# Patient Record
Sex: FEMALE | Race: WHITE | NOT HISPANIC OR LATINO | Employment: OTHER | URBAN - METROPOLITAN AREA
[De-identification: names, ages, dates, MRNs, and addresses within clinical notes are randomized per-mention and may not be internally consistent; named-entity substitution may affect disease eponyms.]

---

## 2017-01-06 ENCOUNTER — RADIANT APPOINTMENT (OUTPATIENT)
Dept: GENERAL RADIOLOGY | Facility: CLINIC | Age: 82
End: 2017-01-06
Attending: FAMILY MEDICINE
Payer: COMMERCIAL

## 2017-01-06 ENCOUNTER — OFFICE VISIT (OUTPATIENT)
Dept: FAMILY MEDICINE | Facility: CLINIC | Age: 82
End: 2017-01-06
Payer: COMMERCIAL

## 2017-01-06 VITALS
HEIGHT: 66 IN | HEART RATE: 64 BPM | WEIGHT: 171 LBS | TEMPERATURE: 97.3 F | OXYGEN SATURATION: 98 % | SYSTOLIC BLOOD PRESSURE: 136 MMHG | DIASTOLIC BLOOD PRESSURE: 69 MMHG | BODY MASS INDEX: 27.48 KG/M2
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
## 2017-01-06 DIAGNOSIS — M79.642 PAIN OF LEFT HAND: ICD-10-CM

## 2017-01-06 DIAGNOSIS — M79.642 PAIN OF LEFT HAND: Primary | ICD-10-CM

## 2017-01-06 PROCEDURE — 99213 OFFICE O/P EST LOW 20 MIN: CPT | Performed by: FAMILY MEDICINE

## 2017-01-06 PROCEDURE — 73090 X-RAY EXAM OF FOREARM: CPT | Mod: LT

## 2017-01-06 PROCEDURE — 73130 X-RAY EXAM OF HAND: CPT | Mod: LT

## 2017-01-06 ASSESSMENT — PAIN SCALES - GENERAL: PAINLEVEL: WORST PAIN (10)

## 2017-01-06 NOTE — MR AVS SNAPSHOT
After Visit Summary   1/6/2017    Tessa Mansfield    MRN: [id number]           Patient Information     Date Of Birth          1935        Visit Information        Provider Department      1/6/2017 1:20 PM Engelmann, Lauren Anneliese, MD [facility]        Today's Diagnoses     Pain of left hand    -  1     Fall from standing, initial encounter           Care Instructions    You can use ice to the area for 15-20 mins at a time to help with and swelling.   Use Tylenol as needed for pain.   If you are getting worse, please come back.         Follow-ups after your visit        Your next 10 appointments already scheduled     Mar 21, 2017  1:00 PM   (Arrive by 12:45 PM)   Pacemaker Check with  Cv Device 1   [facility] ([facility])    [address] [place] [address] [id number] [phone]            Nov 08, 2017 10:00 AM   Lab with  LAB   [facility] ([facility])    [address] [place] [address] [id number] [phone]            Nov 08, 2017 11:10 AM   (Arrive by 10:40 AM)   Return Visit with Marzena Martin MD   [facility]ph[facility] ([facility])    [address] [place] [id number] [phone]              Who to contact     If you have questions or need follow up information about today's clinic visit or your schedule please contact [facility] directly at [phone].  Normal or non-critical lab and imaging results will be communicated to you by MyChart, letter or phone within 4 business days after the clinic has received the results. If you do not hear from us within 7 days, please contact the clinic through MyChart or phone. If you have a critical or abnormal lab result, we will notify you by phone as soon as possible.  Submit refill requests through [employer] or call your pharmacy and they will  "forward the refill request to us. Please allow 3 business days for your refill to be completed.          Additional Information About Your Visit        [employer] Direct Information     [email or web address] lets you send messages to your doctor, view your test results, renew your prescriptions, schedule appointments and more. To sign up, go to [email or web address] . Click on \"Log in\" on the left side of the screen, which will take you to the Welcome page. Then click on \"Sign up Now\" on the right side of the page.     You will be asked to enter the access code listed below, as well as some personal information. Please follow the directions to create your username and password.     Your access code is: [email or web address]  Expires: 2017  2:24 PM     Your access code will  in 90 days. If you need help or a new code, please call your [facility] clinic or [phone].        Care EveryWhere ID     This is your Care EveryWhere ID. This could be used by other organizations to access your [facility] medical records  [id number]        Your Vitals Were     Pulse Temperature Height BMI (Body Mass Index) Pulse Oximetry       64 97.3  F (36.3  C) (Oral) 5' 6\" (1.676 m) 27.61 kg/m2 98%        Blood Pressure from Last 3 Encounters:   17 136/69   16 159/76   10/27/16 135/71    Weight from Last 3 Encounters:   17 171 lb (77.565 kg)   16 169 lb (76.658 kg)   10/27/16 173 lb 11.2 oz (78.79 kg)                 Today's Medication Changes          These changes are accurate as of: 17  2:46 PM.  If you have any questions, ask your nurse or doctor.               Start taking these medicines.        Dose/Directions    order for DME   Used for:  Pain of left hand, Fall from standing, initial encounter   Started by:  Engelmann, Lauren Anneliese, MD        Equipment being ordered: Left hand and wrist brace   Quantity:  1 each   Refills:  0            Where to get your medicines      Some of these will need a paper prescription and " others can be bought over the counter.  Ask your nurse if you have questions.     Bring a paper prescription for each of these medications    - order for DME             Primary Care Provider Office Phone # Fax #    Pedro Gomez [phone]       [facility] [address] [place] [phone]        Thank you!     Thank you for choosing [facility]  for your care. Our goal is always to provide you with excellent care. Hearing back from our patients is one way we can continue to improve our services. Please take a few minutes to complete the written survey that you may receive in the mail after your visit with us. Thank you!             Your Updated Medication List - Protect others around you: Learn how to safely use, store and throw away your medicines at www.disposemymeds.org.          This list is accurate as of: 1/6/17  2:46 PM.  Always use your most recent med list.                   Brand Name Dispense Instructions for use    allopurinol 100 MG tablet    ZYLOPRIM    30 tablet    Take 1 tablet (100 mg) by mouth daily       amitriptyline 10 MG tablet    ELAVIL    60 tablet    Take one po qhs for a week then increase to 2 tabs po qhs       aspirin 81 MG tablet      Take 1 tablet by mouth daily.       cephalexin 250 MG capsule    KEFLEX    40 capsule    Take 1 capsule (250 mg) by mouth 4 times daily       clopidogrel 75 MG tablet    PLAVIX    90 tablet    Take 1 tablet (75 mg) by mouth daily       cyanocobalamin 1000 MCG tablet    vitamin  B-12     Take 1 tablet by mouth daily. As directed       doxycycline 100 MG capsule    VIBRAMYCIN    14 capsule    Take 1 capsule (100 mg) by mouth 2 times daily       * furosemide 20 MG tablet    LASIX    180 tablet    Take 20 mg by mouth 2 times daily       * furosemide 20 MG tablet    LASIX    90 tablet    Take 2 tablets (40 mg) by mouth daily       gabapentin 100 MG capsule    NEURONTIN    90 capsule    Take 1  capsule (100 mg) by mouth 3 times daily       hydrALAZINE 100 MG Tabs tablet    APRESOLINE    270 tablet    Take 1 tablet (100 mg) by mouth 3 times daily       isosorbide mononitrate 20 MG tablet    ISMO/MONOKET    540 tablet    Take 2 tablets (40 mg) by mouth 3 times daily Please see Dr. Mir for further refills.       itraconazole 100 MG capsule    SPORANOX    180 capsule    Take 2 capsules (200 mg) by mouth daily       * methylPREDNISolone 4 MG tablet    MEDROL DOSEPAK    21 tablet    Follow package instructions       * methylPREDNISolone 4 MG tablet    MEDROL DOSEPAK    21 tablet    Follow package instructions       metoprolol 100 MG tablet    LOPRESSOR    180 tablet    TAKE ONE TABLET BY MOUTH TWICE DAILY       multivitamin  with lutein Caps per capsule      Take 1 capsule by mouth daily       nitroglycerin 0.4 MG sublingual tablet    NITROSTAT    25 tablet    Place 1 tablet (0.4 mg) under the tongue See Admin Instructions If no relief after 3rd dose, call 911.       omeprazole 40 MG capsule    priLOSEC    90 capsule    TAKE ONE CAPSULE BY MOUTH ONCE DAILY       order for DME     1 each    Equipment being ordered: Left hand and wrist brace       * potassium chloride 10 MEQ tablet    K-TAB,KLOR-CON    180 tablet    Take 10 mEq by mouth 2 times daily       * potassium chloride 10 MEQ tablet    K-TAB,KLOR-CON    180 tablet    TAKE ONE TABLET BY MOUTH TWICE DAILY       pravastatin 80 MG tablet    PRAVACHOL    90 tablet    TAKE ONE TABLET BY MOUTH ONCE DAILY       timolol 0.5 % ophthalmic solution    TIMOPTIC         * Notice:  This list has 6 medication(s) that are the same as other medications prescribed for you. Read the directions carefully, and ask your doctor or other care provider to review them with you.

## 2017-01-06 NOTE — PROGRESS NOTES
Quick Note:    Reviewed in clinic with patient. Arthritic changes noted.     Lauren Engelmann, MD  ______

## 2017-01-06 NOTE — NURSING NOTE
"Chief Complaint   Patient presents with     Musculoskeletal Problem       Initial /69 mmHg  Pulse 64  Temp(Src) 97.3  F (36.3  C) (Oral)  Ht 5' 6\" (1.676 m)  Wt 171 lb (77.565 kg)  BMI 27.61 kg/m2  SpO2 98% Estimated body mass index is 27.61 kg/(m^2) as calculated from the following:    Height as of this encounter: 5' 6\" (1.676 m).    Weight as of this encounter: 171 lb (77.565 kg).  BP completed using cuff size: wrist cuff    Dayanara Webber MA      "

## 2017-01-06 NOTE — PROGRESS NOTES
SUBJECTIVE:                                                    Tessa Mansfield is a 81 year old female who presents to clinic today for the following health issues:    Joint Pain     Onset: 12/29/16    Description:   Location: left hand   Character: Sharp    Intensity: 10/10    Progression of Symptoms: worse    Accompanying Signs & Symptoms:  Other symptoms: radiation of pain to arm   History:   Previous similar pain: no       Precipitating factors:   Trauma or overuse: YES- fall    Alleviating factors:  Improved by: rest/inactivity       Therapies Tried and outcome: rest    Pain didn't start until 2-3 days ago.   On Plavix, but stopped on Wednesday, having BCC removed.   No numbness or tingling in that hand.       Problem list and histories reviewed & adjusted, as indicated.  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     Transient complete heart block (H)     Sinus node dysfunction (H)     Disturbance of skin sensation     NSTEMI (non-ST elevated myocardial infarction) (H)     Arrhythmia     Chest pain     Past Surgical History   Procedure Laterality Date     Hysterectomy       Cholecystectomy       Hc ppm insertion new/replacement w/ atrial&ventricular lead  11-       Social History   Substance Use Topics     Smoking status: Former Smoker -- 0.30 packs/day for 15 years     Types: Cigarettes     Smokeless tobacco: Never Used      Comment: Quit 22+ years ago     Alcohol Use: Not on file     Family History   Problem Relation Age of Onset     CANCER Sister 82     bladder cancer      "      ROS:  Constitutional, HEENT, cardiovascular, pulmonary, gi and gu systems are negative, except as otherwise noted.    OBJECTIVE:                                                    /69 mmHg  Pulse 64  Temp(Src) 97.3  F (36.3  C) (Oral)  Ht 5' 6\" (1.676 m)  Wt 171 lb (77.565 kg)  BMI 27.61 kg/m2  SpO2 98%  Body mass index is 27.61 kg/(m^2).  GENERAL: healthy, alert and no distress  RESP: lungs clear to auscultation - no rales, rhonchi or wheezes  CV: regular rate and rhythm, normal S1 S2, no S3 or S4, no murmur, click or rub, no peripheral edema and peripheral pulses strong  ABDOMEN: soft, nontender, no hepatosplenomegaly, no masses and bowel sounds normal  MS: LUE exam shows pain with flexion and extension of wrist, mild edema, no palpable bony abnormality.  NEURO: Normal strength and tone, mentation intact and speech normal  PSYCH: mentation appears normal, affect normal/bright    Diagnostic Test Results:  none      ASSESSMENT/PLAN:                                                        ICD-10-CM    1. Pain of left hand M79.642 XR Hand Left G/E 3 Views     XR Forearm Left 2 Views     order for DME   2. Fall from standing, initial encounter W19.XXXA XR Hand Left G/E 3 Views     XR Forearm Left 2 Views     order for DME     Reviewed plain films in the office, and radiology report. No acute fracture or malalignment. Will treat with brace for comfort.     See Patient Instructions    Lauren A. Engelmann, MD  [facility]    "

## 2017-01-06 NOTE — PATIENT INSTRUCTIONS
You can use ice to the area for 15-20 mins at a time to help with and swelling.   Use Tylenol as needed for pain.   If you are getting worse, please come back.

## 2017-01-18 ENCOUNTER — OFFICE VISIT (OUTPATIENT)
Dept: FAMILY MEDICINE | Facility: CLINIC | Age: 82
End: 2017-01-18

## 2017-01-18 VITALS
WEIGHT: 166 LBS | DIASTOLIC BLOOD PRESSURE: 65 MMHG | SYSTOLIC BLOOD PRESSURE: 163 MMHG | BODY MASS INDEX: 26.81 KG/M2 | RESPIRATION RATE: 16 BRPM | HEART RATE: 62 BPM

## 2017-01-18 DIAGNOSIS — Z12.31 VISIT FOR SCREENING MAMMOGRAM: ICD-10-CM

## 2017-01-18 DIAGNOSIS — N39.498 OTHER URINARY INCONTINENCE: ICD-10-CM

## 2017-01-18 DIAGNOSIS — M25.532 LEFT WRIST PAIN: ICD-10-CM

## 2017-01-18 DIAGNOSIS — Z12.11 SCREEN FOR COLON CANCER: Primary | ICD-10-CM

## 2017-01-18 LAB
ALBUMIN UR-MCNC: NEGATIVE MG/DL
APPEARANCE UR: CLEAR
BILIRUB UR QL STRIP: NEGATIVE
COLOR UR AUTO: ABNORMAL
GLUCOSE UR STRIP-MCNC: NEGATIVE MG/DL
HGB UR QL STRIP: NEGATIVE
KETONES UR STRIP-MCNC: NEGATIVE MG/DL
LEUKOCYTE ESTERASE UR QL STRIP: NEGATIVE
MUCOUS THREADS #/AREA URNS LPF: PRESENT /LPF
NITRATE UR QL: NEGATIVE
PH UR STRIP: 7 PH (ref 5–7)
RBC #/AREA URNS AUTO: <1 /HPF (ref 0–2)
SP GR UR STRIP: 1.01 (ref 1–1.03)
SQUAMOUS #/AREA URNS AUTO: <1 /HPF (ref 0–1)
URN SPEC COLLECT METH UR: ABNORMAL
UROBILINOGEN UR STRIP-MCNC: 0 MG/DL (ref 0–2)
WBC #/AREA URNS AUTO: 1 /HPF (ref 0–2)

## 2017-01-18 RX ORDER — FUROSEMIDE 40 MG
TABLET ORAL
COMMUNITY
Start: 2017-01-10 | End: 2017-05-24

## 2017-01-18 ASSESSMENT — PAIN SCALES - GENERAL: PAINLEVEL: MODERATE PAIN (5)

## 2017-01-18 NOTE — PROGRESS NOTES
"SUBJECTIVE:    Pt is a 81 year old female with pmh of     Patient Active Problem List   Diagnosis     Degeneration of cervical intervertebral disc     Nonallopathic lesion of cervical region     Nonallopathic lesion of thoracic region     Coronary artery disease     Dizziness and giddiness     Edema     Esophageal reflux     Gout     Essential hypertension     Obstructive sleep apnea     Osteomalacia     Post-menopausal osteoporosis     Cardiac Pacemaker- Medtronic, dual chamber- NOT dependant     Transient complete heart block (H)     Sinus node dysfunction (H)     Disturbance of skin sensation     NSTEMI (non-ST elevated myocardial infarction) (H)     Arrhythmia     Chest pain       who is here for evaluation of had concerns including Fall.    Here in f/u wrist pain. Fell accidental. Late December, see notes in EPIC, images too.    Also wonders about getting off of her neurontin, makes her shaky, not sure how much helps, just 100 mg tid, she'll wean off and let us know if needs to try other PN med.    Also, gradual more double voiding an incont/urine, hard to say stress or spastic bladder too. No UTI sx.    No bladder history.    Allergies   Allergen Reactions     Bactrim [Sulfamethoxazole W/Trimethoprim] Other (See Comments)     Patient unable to recall     Biaxin [Clarithromycin]      Chlorthalidone Nausea and Vomiting     Clonidine      Codeine Sulfate Itching     Darvon [Propoxyphene Hcl]      Gabapentin Other (See Comments) and Fatigue     \"felt drunk\"     Indocin      Levaquin [Levofloxacin Hemihydrate]      Lyrica Fatigue     Morphine Sulfate      Percocet [Oxycodone-Acetaminophen]      Spironolactone Other (See Comments)     Dehydrated       Terazosin      Ciprofloxacin Rash     Simvastatin Palpitations     Muscle weakness, leg cramping             Current Outpatient Prescriptions   Medication Sig Dispense Refill     furosemide (LASIX) 40 MG tablet        order for DME Equipment being ordered: Left hand " and wrist brace 1 each 0     itraconazole (SPORANOX) 100 MG capsule Take 2 capsules (200 mg) by mouth daily 180 capsule 0     metoprolol (LOPRESSOR) 100 MG tablet TAKE ONE TABLET BY MOUTH TWICE DAILY 180 tablet 3     gabapentin (NEURONTIN) 100 MG capsule Take 1 capsule (100 mg) by mouth 3 times daily 90 capsule 5     furosemide (LASIX) 20 MG tablet Take 2 tablets (40 mg) by mouth daily 90 tablet 3     omeprazole (PRILOSEC) 40 MG capsule TAKE ONE CAPSULE BY MOUTH ONCE DAILY 90 capsule 3     allopurinol (ZYLOPRIM) 100 MG tablet Take 1 tablet (100 mg) by mouth daily 30 tablet 3     pravastatin (PRAVACHOL) 80 MG tablet TAKE ONE TABLET BY MOUTH ONCE DAILY 90 tablet 3     hydrALAZINE (APRESOLINE) 100 MG TABS Take 1 tablet (100 mg) by mouth 3 times daily 270 tablet 3     potassium chloride (K-DUR) 10 MEQ tablet Take 10 mEq by mouth 2 times daily  180 tablet 1     timolol (TIMOPTIC) 0.5 % ophthalmic solution        clopidogrel (PLAVIX) 75 MG tablet Take 1 tablet (75 mg) by mouth daily 90 tablet 3     isosorbide mononitrate (ISMO,MONOKET) 20 MG tablet Take 2 tablets (40 mg) by mouth 3 times daily Please see Dr. Mir for further refills. 540 tablet 3     nitroglycerin (NITROSTAT) 0.4 MG SL tablet Place 1 tablet (0.4 mg) under the tongue See Admin Instructions If no relief after 3rd dose, call 911. 25 tablet 6     multivitamin  with lutein (OCUVITE WITH LTEIN) CAPS Take 1 capsule by mouth daily       aspirin 81 MG tablet Take 1 tablet by mouth daily.       cyanocolbalamin (VITAMIN B-12) 1000 MCG tablet Take 1 tablet by mouth daily. As directed       [DISCONTINUED] furosemide (LASIX) 20 MG tablet Take 20 mg by mouth 2 times daily  180 tablet 1       Social History   Substance Use Topics     Smoking status: Former Smoker -- 0.30 packs/day for 15 years     Types: Cigarettes     Smokeless tobacco: Never Used      Comment: Quit 22+ years ago     Alcohol Use: Not on file           OBJECTIVE:  /65 mmHg  Pulse 62  Resp 16  Wt  75.297 kg (166 lb)  Breastfeeding? No  GENERAL APPEARANCE: Alert, no acute distress  MS: Left wrist not red/warm/swollen, mild tender ulnar side, and hurts to flex or extend in that area. Hand n/v intact  NEURO: Alert, oriented, speech and mentation normal  PSYCHE: mentation appears normal, affect and mood normal    ASSESSMENT/PLAN:    Wrist pain:  CT  Sp Med  Splint helps, continue    Urine incont  UA  Urology    PN: wean off neurontin. Cannot tolerate Lyrica either.    1/2 hr visit over half couns/coord care     DIALLO RITTER MD

## 2017-01-18 NOTE — MR AVS SNAPSHOT
After Visit Summary   1/18/2017    Tessa Mansfield    MRN: [id number]           Patient Information     Date Of Birth          1935        Visit Information        Provider Department      1/18/2017 1:05 PM Pedro Gomez MD [facility]        Today's Diagnoses     Screen for colon cancer    -  1     Visit for screening mammogram         Left wrist pain         Other urinary incontinence           Care Instructions    Primary Care Center Medication Refill Request Information:  * Please contact your pharmacy regarding ANY request for medication refills.  ** [facility] Prescription Fax = [phone]  * Please allow 3 business days for routine medication refills.  * Please allow 5 business days for controlled substance medication refills.     Primary Care Center Test Result notification information:  *You will be notified with in 7-10 days of your appointment day regarding the results of your test.  If you are on MyChart you will be notified as soon as the provider has reviewed the results and signed off on them.          Follow-ups after your visit        Additional Services     SPORTS MEDICINE REFERRAL       Your provider has referred you to:  [facility]: Sports Medicine Clinic [facility] [phone]   [email or web address]    Please be aware that coverage of these services is subject to the terms and limitations of your health insurance plan.  Call member services at your health plan with any benefit or coverage questions.      Please bring the following to your appointment:    >>   Any x-rays, CTs or MRIs which have been performed.  Contact the facility where they were done to arrange for  prior to your scheduled appointment.    >>   List of current medications   >>   This referral request   >>   Any documents/labs given to you for this referral            UROLOGY ADULT REFERRAL       Your provider has referred you to: P: [place] [facility]  Prostate and Urologic C[facility] [phone]   [email or web address]    Please be aware that coverage of these services is subject to the terms and limitations of your health insurance plan.  Call member services at your health plan with any benefit or coverage questions.      Please bring the following with you to your appointment:    (1) Any X-Rays, CTs or MRIs which have been performed.  Contact the facility where they were done to arrange for  prior to your scheduled appointment.    (2) List of current medications  (3) This referral request   (4) Any documents/labs given to you for this referral                  Your next 10 appointments already scheduled     Jan 18, 2017  2:00 PM   LAB with  LAB   [facility] ([facility])    [address] [place] [address] [phone]           Patient must bring picture ID.  Patient should be prepared to give a urine specimen  Please do not eat 10-12 hours before your appointment if you are coming in fasting for labs on lipids, cholesterol, or glucose (sugar).  Pregnant women should follow their Care Team instructions. Water with medications is okay. Do not drink coffee or other fluids.   If you have concerns about taking  your medications, please ask at office or if scheduling via [employer], send a message by clicking on Secure Messaging, Message Your Care Team.            Jan 18, 2017  2:20 PM   CT WRIST LEFT W/O CONTRAST with UCCT2   [facility] [place] [facility] ([facility])    [address] [place] [id number] [phone]           Please bring any scans or X-rays taken at other hospitals, if similar tests were done. Also bring a list of your medicines, including vitamins, minerals and over-the-counter drugs. It is safest to leave personal items at home.  Be sure to tell your doctor:   If  you have any allergies.   If there s any chance you are pregnant.   If you are breastfeeding.   If you have any special needs.  You do not need to do anything special to prepare.  Please wear loose clothing, such as a sweat suit or jogging clothes. Avoid snaps, zippers and other metal. We may ask you to undress and put on a hospital gown.            Jan 23, 2017  4:00 PM   (Arrive by 3:45 PM)   New Patient Visit with Jerrell Bustos MD   [facility] Sports Medicine ([facility])    [address] [place] [address]  [address] [facility] [address]   [phone]            Feb 23, 2017 12:30 PM   (Arrive by 12:15 PM)   NEW FEMALE PELVIC with Adriana Lilly MD   [facility] Urology and [facility] for Prostate and Urologic Cancers ([facility])    [address] [place] [address]  [address] [facility] [address]   [phone]            Mar 21, 2017  1:00 PM   (Arrive by 12:45 PM)   Pacemaker Check with  Cv Device [address] [place] [address] Heart Care ([facility])    [address] [place] [address]  [address] [facility] [address]   [phone]            Nov 08, 2017 10:00 AM   Lab with UC LAB   [facility] Lab ([facility])    [address] [place] [address]  [address] [facility] [address]   [phone]            Nov 08, 2017 11:10 AM   (Arrive by 10:40 AM)   Return Visit with Marzena Martin MD   [facility] Nephrology ([facility])    [address] [place] [address]  [address] [facility] [id number] [phone]              Future tests that were ordered for you today     Open Future Orders        Priority Expected Expires Ordered    Routine UA with micro reflex to culture Routine  1/18/2018 1/18/2017    CT Wrist Left w/o Contrast Routine  1/18/2018 1/18/2017            Who to contact     Please call your clinic at [phone] to:    Ask questions about your health    Make or cancel appointments    Discuss your medicines    Learn  about your test results    Speak to your doctor   If you have compliments or concerns about an experience at your clinic, or if you wish to file a complaint, please contact [facility] Patient Relations at [phone] or email us at [email or web address]         Additional Information About Your Visit        [employer] Information     [employer] is an electronic gateway that provides easy, online access to your medical records. With [employer], you can request a clinic appointment, read your test results, renew a prescription or communicate with your care team.     To sign up for [employer] visit the website at [email or web address]   You will be asked to enter the access code listed below, as well as some personal information. Please follow the directions to create your username and password.     Your access code is: [email or web address]  Expires: 2017  2:24 PM     Your access code will  in 90 days. If you need help or a new code, please contact your [facility] or call [phone] for assistance.        Care EveryWhere ID     This is your Care EveryWhere ID. This could be used by other organizations to access your [place] medical records  [id number]        Your Vitals Were     Pulse Respirations Breastfeeding?             62 16 No          Blood Pressure from Last 3 Encounters:   17 163/65   17 136/69   16 159/76    Weight from Last 3 Encounters:   17 75.297 kg (166 lb)   17 77.565 kg (171 lb)   16 76.658 kg (169 lb)              We Performed the Following     SPORTS MEDICINE REFERRAL     UROLOGY ADULT REFERRAL          Today's Medication Changes          These changes are accurate as of: 17  1:59 PM.  If you have any questions, ask your nurse or doctor.               These medicines have changed or have updated prescriptions.        Dose/Directions    * furosemide 20 MG tablet   Commonly known as:  LASIX    This may have changed:  Another medication with the same name was removed. Continue taking this medication, and follow the directions you see here.   Used for:  Renal hypertension, stage 1-4 or unspecified chronic kidney disease   Changed by:  Bob Bowman MD        Dose:  40 mg   Take 2 tablets (40 mg) by mouth daily   Quantity:  90 tablet   Refills:  3       * furosemide 40 MG tablet   Commonly known as:  LASIX   This may have changed:  Another medication with the same name was removed. Continue taking this medication, and follow the directions you see here.   Changed by:  Pedro Gomez MD        Refills:  0       * Notice:  This list has 2 medication(s) that are the same as other medications prescribed for you. Read the directions carefully, and ask your doctor or other care provider to review them with you.             Primary Care Provider Office Phone # Fax #    Pedro Gomez [phone]       [facility] [address] [place] [id number]        Thank you!     Thank you for choosing [facility]  for your care. Our goal is always to provide you with excellent care. Hearing back from our patients is one way we can continue to improve our services. Please take a few minutes to complete the written survey that you may receive in the mail after your visit with us. Thank you!             Your Updated Medication List - Protect others around you: Learn how to safely use, store and throw away your medicines at www.disposemymeds.org.          This list is accurate as of: 1/18/17  1:59 PM.  Always use your most recent med list.                   Brand Name Dispense Instructions for use    allopurinol 100 MG tablet    ZYLOPRIM    30 tablet    Take 1 tablet (100 mg) by mouth daily       aspirin 81 MG tablet      Take 1 tablet by mouth daily.       clopidogrel 75 MG tablet    PLAVIX    90 tablet    Take 1 tablet (75 mg) by mouth daily       cyanocobalamin  1000 MCG tablet    vitamin  B-12     Take 1 tablet by mouth daily. As directed       * furosemide 20 MG tablet    LASIX    90 tablet    Take 2 tablets (40 mg) by mouth daily       * furosemide 40 MG tablet    LASIX         gabapentin 100 MG capsule    NEURONTIN    90 capsule    Take 1 capsule (100 mg) by mouth 3 times daily       hydrALAZINE 100 MG Tabs tablet    APRESOLINE    270 tablet    Take 1 tablet (100 mg) by mouth 3 times daily       isosorbide mononitrate 20 MG tablet    ISMO/MONOKET    540 tablet    Take 2 tablets (40 mg) by mouth 3 times daily Please see Dr. Mir for further refills.       itraconazole 100 MG capsule    SPORANOX    180 capsule    Take 2 capsules (200 mg) by mouth daily       metoprolol 100 MG tablet    LOPRESSOR    180 tablet    TAKE ONE TABLET BY MOUTH TWICE DAILY       multivitamin  with lutein Caps per capsule      Take 1 capsule by mouth daily       nitroglycerin 0.4 MG sublingual tablet    NITROSTAT    25 tablet    Place 1 tablet (0.4 mg) under the tongue See Admin Instructions If no relief after 3rd dose, call 911.       omeprazole 40 MG capsule    priLOSEC    90 capsule    TAKE ONE CAPSULE BY MOUTH ONCE DAILY       order for DME     1 each    Equipment being ordered: Left hand and wrist brace       potassium chloride 10 MEQ tablet    K-TAB,KLOR-CON    180 tablet    Take 10 mEq by mouth 2 times daily       pravastatin 80 MG tablet    PRAVACHOL    90 tablet    TAKE ONE TABLET BY MOUTH ONCE DAILY       timolol 0.5 % ophthalmic solution    TIMOPTIC         * Notice:  This list has 2 medication(s) that are the same as other medications prescribed for you. Read the directions carefully, and ask your doctor or other care provider to review them with you.

## 2017-01-18 NOTE — PATIENT INSTRUCTIONS
Primary Care Center Medication Refill Request Information:  * Please contact your pharmacy regarding ANY request for medication refills.  ** [facility] Prescription Fax = [phone]  * Please allow 3 business days for routine medication refills.  * Please allow 5 business days for controlled substance medication refills.     Primary Care Center Test Result notification information:  *You will be notified with in 7-10 days of your appointment day regarding the results of your test.  If you are on MyChart you will be notified as soon as the provider has reviewed the results and signed off on them.

## 2017-01-18 NOTE — NURSING NOTE
Chief Complaint   Patient presents with     Fall     Patient is here to follow up with left wrist pain from fall, duration 1 month     Josephine Giles CMA 1:01 PM on 1/18/2017.

## 2017-01-19 ENCOUNTER — PRE VISIT (OUTPATIENT)
Dept: UROLOGY | Facility: CLINIC | Age: 82
End: 2017-01-19

## 2017-01-23 ENCOUNTER — OFFICE VISIT (OUTPATIENT)
Dept: ORTHOPEDICS | Facility: CLINIC | Age: 82
End: 2017-01-23

## 2017-01-23 VITALS
HEIGHT: 65 IN | DIASTOLIC BLOOD PRESSURE: 66 MMHG | HEART RATE: 75 BPM | BODY MASS INDEX: 27.49 KG/M2 | SYSTOLIC BLOOD PRESSURE: 143 MMHG | WEIGHT: 165 LBS

## 2017-01-23 DIAGNOSIS — M19.032 CMC DJD(CARPOMETACARPAL DEGENERATIVE JOINT DISEASE), LOCALIZED PRIMARY, LEFT: Primary | ICD-10-CM

## 2017-01-23 DIAGNOSIS — S63.502A LEFT WRIST SPRAIN, INITIAL ENCOUNTER: ICD-10-CM

## 2017-01-23 NOTE — Clinical Note
"  1/23/2017      RE: Tessa Mansfield  [address]  [facility] [address]       Sports Medicine Clinic Visit    PCP: Pedro Gomez    Tessa Mansfield is a 81 year old female who is seen  in consultation at the request of Dr. Gomez presenting with left wrist pain. She was walking and tripped on cane and had FOOSH mechanism.    Injury: 12/29/16 left wrist pain.    Location of Pain: left wrist  Duration of Pain: 1month  Rating of Pain: 4/10  Pain is better with: Tylenol, wrist splint  Pain is worse with: pressure, wrist flexion  Additional Features: Currently wearing procare wrist splint  Treatment so far consists of: Ice, Tylenol, DME splint and rest  Prior History of related problems: No    /66 mmHg  Pulse 75  Ht 5' 5\" (1.651 m)  Wt 165 lb (74.844 kg)  BMI 27.46 kg/m2       PMH:  Past Medical History   Diagnosis Date     Hypertension      CAD (coronary artery disease)      Hyperlipidemia LDL goal <70      CKD (chronic kidney disease), stage IV (H)      Stented coronary artery 10-     RCA     Stented coronary artery 2-5-2003     LCx     Stented coronary artery 4-     LAD     Stented coronary artery 11-     LAD     Stented coronary artery 12-     RCA     Cardiac Pacemaker- Medtronic, dual chamber- NOT dependant 11/28/2007     Transient complete heart block (H) 11/28/2007       Active problem list:  Patient Active Problem List   Diagnosis     Degeneration of cervical intervertebral disc     Nonallopathic lesion of cervical region     Nonallopathic lesion of thoracic region     Coronary artery disease     Dizziness and giddiness     Edema     Esophageal reflux     Gout     Essential hypertension     Obstructive sleep apnea     Osteomalacia     Post-menopausal osteoporosis     Cardiac Pacemaker- Medtronic, dual chamber- NOT dependant     Transient complete heart block (H)     Sinus node dysfunction (H)     Disturbance of skin sensation     NSTEMI (non-ST elevated " myocardial infarction) (H)     Arrhythmia     Chest pain       FH:  Family History   Problem Relation Age of Onset     CANCER Sister 82     bladder cancer       SH:  Social History     Social History     Marital Status:      Spouse Name: N/A     Number of Children: 4     Years of Education: N/A     Occupational History      [employer]     Social History Main Topics     Smoking status: Former Smoker -- 0.30 packs/day for 15 years     Types: Cigarettes     Smokeless tobacco: Never Used      Comment: Quit 22+ years ago     Alcohol Use: Not on file     Drug Use: No     Sexual Activity:     Partners: Female     Birth Control/ Protection: Post-menopausal     Other Topics Concern     Blood Transfusions Yes     Exercise No     Social History Narrative       MEDS:  See EMR, reviewed  ALL:  See EMR, reviewed    REVIEW OF SYSTEMS:  CONSTITUTIONAL:NEGATIVE for fever, chills, change in weight  INTEGUMENTARY/SKIN: NEGATIVE for worrisome rashes, moles or lesions  EYES: NEGATIVE for vision changes or irritation  ENT/MOUTH: NEGATIVE for ear, mouth and throat problems  RESP:NEGATIVE for significant cough or SOB  BREAST: NEGATIVE for masses, tenderness or discharge  CV: NEGATIVE for chest pain, palpitations or peripheral edema  GI: NEGATIVE for nausea, abdominal pain, heartburn, or change in bowel habits  :NEGATIVE for frequency, dysuria, or hematuria  :NEGATIVE for frequency, dysuria, or hematuria  NEURO: NEGATIVE for weakness, dizziness or paresthesias  ENDOCRINE: NEGATIVE for temperature intolerance, skin/hair changes  HEME/ALLERGY/IMMUNE: NEGATIVE for bleeding problems  PSYCHIATRIC: NEGATIVE for changes in mood or affect    SUBJECTIVE:  This 81-year-old, right-handed individual fell onto an outstretched left hand approximately a month ago.  She had pain over the centralized wrist more toward the ulnar side of her wrist.  She has since had a CAT scan and an x-ray that show no fracture, although she does have  severe DJD at the CMC joint of her thumb.  She denies pain at the thumb joint and denies having chronic pain there all the time.  She does have arthritis in other joints and tends to use paraffin wax treatment for these.  She has been wearing a splint on and off for the last month.      OBJECTIVE:  The splint is removed.  I do not see any swelling at the wrist.  She is nontender at the lunate, nontender at the scapholunate junction.  A Moran test appears to be negative.  She is nontender over the distal radius.  I cannot get any specific tenderness today at the CMC joint.  A grind test is negative.  Ulnar compression and radial compression are without discomfort.  Grasp strength is intact.  Capillary refill is normal at the hand.      ASSESSMENT:     1.  CMC arthritis of the right hand.   2.  Recovering wrist sprain.      PLAN:  At this point, I asked her to discontinue the splint.  I gave her some gentle range-of-motion exercises that could be done in both flexion and extension and supination and pronation with a light weight held in the hand.  Tylenol for pain.  Paraffin wax treatments as appropriate.  I reassured her that there are no signs of fracture, which she was relieved about.  I would expect slow improvements over the next 4-6 weeks.  If she does not improve, it may be helpful to do an MRI to look at her scapholunate ligament of her wrist.  There was not obvious diastasis or injuries seen on her CT scan.       Jerrell Bustos MD

## 2017-01-23 NOTE — PROGRESS NOTES
"Sports Medicine Clinic Visit    PCP: Pedro Gomez RAHEEM Mansfield is a 81 year old female who is seen  in consultation at the request of Dr. Gomez presenting with left wrist pain. She was walking and tripped on cane and had FOOSH mechanism.    Injury: 12/29/16 left wrist pain.    Location of Pain: left wrist  Duration of Pain: 1month  Rating of Pain: 4/10  Pain is better with: Tylenol, wrist splint  Pain is worse with: pressure, wrist flexion  Additional Features: Currently wearing procare wrist splint  Treatment so far consists of: Ice, Tylenol, DME splint and rest  Prior History of related problems: No    /66 mmHg  Pulse 75  Ht 5' 5\" (1.651 m)  Wt 165 lb (74.844 kg)  BMI 27.46 kg/m2       PMH:  Past Medical History   Diagnosis Date     Hypertension      CAD (coronary artery disease)      Hyperlipidemia LDL goal <70      CKD (chronic kidney disease), stage IV (H)      Stented coronary artery 10-     RCA     Stented coronary artery 2-5-2003     LCx     Stented coronary artery 4-     LAD     Stented coronary artery 11-     LAD     Stented coronary artery 12-     RCA     Cardiac Pacemaker- Medtronic, dual chamber- NOT dependant 11/28/2007     Transient complete heart block (H) 11/28/2007       Active problem list:  Patient Active Problem List   Diagnosis     Degeneration of cervical intervertebral disc     Nonallopathic lesion of cervical region     Nonallopathic lesion of thoracic region     Coronary artery disease     Dizziness and giddiness     Edema     Esophageal reflux     Gout     Essential hypertension     Obstructive sleep apnea     Osteomalacia     Post-menopausal osteoporosis     Cardiac Pacemaker- Medtronic, dual chamber- NOT dependant     Transient complete heart block (H)     Sinus node dysfunction (H)     Disturbance of skin sensation     NSTEMI (non-ST elevated myocardial infarction) (H)     Arrhythmia     Chest pain       FH:  Family History   Problem " Relation Age of Onset     CANCER Sister 82     bladder cancer       SH:  Social History     Social History     Marital Status:      Spouse Name: N/A     Number of Children: 4     Years of Education: N/A     Occupational History      [employer]     Social History Main Topics     Smoking status: Former Smoker -- 0.30 packs/day for 15 years     Types: Cigarettes     Smokeless tobacco: Never Used      Comment: Quit 22+ years ago     Alcohol Use: Not on file     Drug Use: No     Sexual Activity:     Partners: Female     Birth Control/ Protection: Post-menopausal     Other Topics Concern     Blood Transfusions Yes     Exercise No     Social History Narrative       MEDS:  See EMR, reviewed  ALL:  See EMR, reviewed    REVIEW OF SYSTEMS:  CONSTITUTIONAL:NEGATIVE for fever, chills, change in weight  INTEGUMENTARY/SKIN: NEGATIVE for worrisome rashes, moles or lesions  EYES: NEGATIVE for vision changes or irritation  ENT/MOUTH: NEGATIVE for ear, mouth and throat problems  RESP:NEGATIVE for significant cough or SOB  BREAST: NEGATIVE for masses, tenderness or discharge  CV: NEGATIVE for chest pain, palpitations or peripheral edema  GI: NEGATIVE for nausea, abdominal pain, heartburn, or change in bowel habits  :NEGATIVE for frequency, dysuria, or hematuria  :NEGATIVE for frequency, dysuria, or hematuria  NEURO: NEGATIVE for weakness, dizziness or paresthesias  ENDOCRINE: NEGATIVE for temperature intolerance, skin/hair changes  HEME/ALLERGY/IMMUNE: NEGATIVE for bleeding problems  PSYCHIATRIC: NEGATIVE for changes in mood or affect    SUBJECTIVE:  This 81-year-old, right-handed individual fell onto an outstretched left hand approximately a month ago.  She had pain over the centralized wrist more toward the ulnar side of her wrist.  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  She denies pain at the thumb joint and denies  having chronic pain there all the time.  She does have arthritis in other joints and tends to use paraffin wax treatment for these.  She has been wearing a splint on and off for the last month.      OBJECTIVE:  The splint is removed.  I do not see any swelling at the wrist.  She is nontender at the lunate, nontender at the scapholunate junction.  A Moran test appears to be negative.  She is nontender over the distal radius.  I cannot get any specific tenderness today at the CMC joint.  A grind test is negative.  Ulnar compression and radial compression are without discomfort.  Grasp strength is intact.  Capillary refill is normal at the hand.      ASSESSMENT:     1.  CMC arthritis of the right hand.   2.  Recovering wrist sprain.      PLAN:  At this point, I asked her to discontinue the splint.  I gave her some gentle range-of-motion exercises that could be done in both flexion and extension and supination and pronation with a light weight held in the hand.  Tylenol for pain.  Paraffin wax treatments as appropriate.  I reassured her that there are no signs of fracture, which she was relieved about.  I would expect slow improvements over the next 4-6 weeks.  If she does not improve, it may be helpful to do an MRI to look at her scapholunate ligament of her wrist.  There was not obvious diastasis or injuries seen on her CT scan.

## 2017-01-23 NOTE — Clinical Note
Thank you for allowing me to see your patient in Sports Medicine Clinic.  Please see the attached copy of our visit.  Sincerely,  Jerrell Bustos MD

## 2017-01-25 DIAGNOSIS — M10.9 GOUT: Primary | ICD-10-CM

## 2017-01-26 RX ORDER — ALLOPURINOL 100 MG/1
100 TABLET ORAL DAILY
Qty: 30 TABLET | Refills: 3 | Status: SHIPPED | OUTPATIENT
Start: 2017-01-26 | End: 2017-05-22

## 2017-01-26 NOTE — TELEPHONE ENCOUNTER
ALLOPURINOL 100MG       Last Written Prescription Date:  9/13/16  Last Fill Quantity: 30,   # refills: 3  Last Office Visit : 1/18/17  Future Office visit:  None  CREATININE   Date Value Ref Range Status   11/07/2016 1.99* 0.52 - 1.04 mg/dL Final     CREATININE FOR METHANEPH 24 H/RANDOM URINE   Date Value Ref Range Status   09/20/2011 63  Final     Comment:     Unit: mg/dL   ]  CBC RESULTS:   Recent Labs   Lab Test  11/07/16   1310   03/24/16   1722   WBC   --    --   10.0   RBC   --    --   4.17   HGB  10.5*   < >  11.8   HCT   --    --   38.0   MCV   --    --   91   MCH   --    --   28.3   MCHC   --    --   31.1*   RDW   --    --   13.2   PLT   --    --   226    < > = values in this interval not displayed.     URIC ACID   Date Value Ref Range Status   07/08/2016 10.4* 2.6 - 6.0 mg/dL Final   ]  Routing refill request to provider for review/approval because: REVIEW LABS FOR +1 MOS RF

## 2017-02-17 ENCOUNTER — TRANSFERRED RECORDS (OUTPATIENT)
Dept: HEALTH INFORMATION MANAGEMENT | Facility: CLINIC | Age: 82
End: 2017-02-17

## 2017-02-21 ENCOUNTER — PRE VISIT (OUTPATIENT)
Dept: UROLOGY | Facility: CLINIC | Age: 82
End: 2017-02-21

## 2017-02-21 NOTE — TELEPHONE ENCOUNTER
Patient with incontinence coming in for a consult. Chart reviewed and records are availble. Message left asking her to come with a full bladder for a dip/pvr.

## 2017-02-22 ENCOUNTER — OFFICE VISIT (OUTPATIENT)
Dept: URGENT CARE | Facility: URGENT CARE | Age: 82
End: 2017-02-22
Payer: COMMERCIAL

## 2017-02-22 VITALS
HEART RATE: 81 BPM | SYSTOLIC BLOOD PRESSURE: 136 MMHG | BODY MASS INDEX: 26.43 KG/M2 | WEIGHT: 158.8 LBS | OXYGEN SATURATION: 96 % | TEMPERATURE: 97.5 F | DIASTOLIC BLOOD PRESSURE: 68 MMHG

## 2017-02-22 DIAGNOSIS — H10.9 UNSPECIFIED CONJUNCTIVITIS: Primary | ICD-10-CM

## 2017-02-22 PROCEDURE — 99213 OFFICE O/P EST LOW 20 MIN: CPT | Performed by: FAMILY MEDICINE

## 2017-02-22 RX ORDER — BACITRACIN 500 [USP'U]/G
1 OINTMENT OPHTHALMIC 3 TIMES DAILY
Qty: 1 G | Refills: 0 | Status: SHIPPED | OUTPATIENT
Start: 2017-02-22 | End: 2017-02-27

## 2017-02-22 NOTE — MR AVS SNAPSHOT
After Visit Summary   2/22/2017    Tessa Mansfield    MRN: [id number]           Patient Information     Date Of Birth          1935        Visit Information        Provider Department      2/22/2017 11:00 AM Yovani Villanueva MD [facility]        Today's Diagnoses     Unspecified conjunctivitis    -  1       Follow-ups after your visit        Your next 10 appointments already scheduled     Feb 23, 2017 12:30 PM CST   (Arrive by 12:15 PM)   NEW FEMALE PELVIC with Adriana See Jatin Lilly MD   [facility] Urology and [facility] for Prostate and Urologic Cancers ([facility])    [address] [place] [address]  4th [facility] [address]   [phone]            Mar 21, 2017  1:00 PM CDT   (Arrive by 12:45 PM)   Pacemaker Check with Uc Cv Device 1   [facility] Heart Care ([facility])    [address] [place] [address]  3rd [facility] [address]   [phone]            Nov 08, 2017 10:00 AM CST   Lab with UC LAB   [facility] Lab ([facility])    [address] [place] [id number] [phone]            Nov 08, 2017 11:10 AM CST   (Arrive by 10:40 AM)   Return Visit with Marzena Martin MD   [facility] Nephrology ([facility])    [address] [place] [id number] [phone]              Who to contact     If you have questions or need follow up information about today's clinic visit or your schedule please contact [facility] directly at [phone].  Normal or non-critical lab and imaging results will be communicated to you by MyChart, letter or phone within 4 business days after the clinic has received the results. If you do not hear from us within 7 days, please contact the clinic through MyChart or phone. If you have a critical or abnormal lab result, we will notify you by phone as soon as possible.  Submit  "refill requests through [employer] or call your pharmacy and they will forward the refill request to us. Please allow 3 business days for your refill to be completed.          Additional Information About Your Visit        [email or web address] Information     [employer] lets you send messages to your doctor, view your test results, renew your prescriptions, schedule appointments and more. To sign up, go to [email or web address] . Click on \"Log in\" on the left side of the screen, which will take you to the Welcome page. Then click on \"Sign up Now\" on the right side of the page.     You will be asked to enter the access code listed below, as well as some personal information. Please follow the directions to create your username and password.     Your access code is: [email or web address]  Expires: 2017  2:24 PM     Your access code will  in 90 days. If you need help or a new code, please call your [facility] clinic or [phone].        Care EveryWhere ID     This is your Care EveryWhere ID. This could be used by other organizations to access your [facility] medical records  [id number]        Your Vitals Were     Pulse Temperature Pulse Oximetry BMI (Body Mass Index)          81 97.5  F (36.4  C) (Oral) 96% 26.43 kg/m2         Blood Pressure from Last 3 Encounters:   17 136/68   17 143/66   17 163/65    Weight from Last 3 Encounters:   17 158 lb 12.8 oz (72 kg)   17 165 lb (74.8 kg)   17 166 lb (75.3 kg)              Today, you had the following     No orders found for display         Today's Medication Changes          These changes are accurate as of: 17 11:23 AM.  If you have any questions, ask your nurse or doctor.               Start taking these medicines.        Dose/Directions    bacitracin ophthalmic ointment   Used for:  Unspecified conjunctivitis   Started by:  Yovani Villanueva MD        Dose:  1 Application   Place 1 Application into the right eye 3 times daily for 5 days " Apply thin ribbon to the lower eyelid as directed.   Quantity:  1 g   Refills:  0            Where to get your medicines      These medications were sent to [id number] - [place] - [address]  [address] [place] [address]     Phone:  [phone]     bacitracin ophthalmic ointment                Primary Care Provider Office Phone # Fax #    Pedro Gomez [phone]       [facility] [address] [place] [address]        Thank you!     Thank you for choosing [facility]  for your care. Our goal is always to provide you with excellent care. Hearing back from our patients is one way we can continue to improve our services. Please take a few minutes to complete the written survey that you may receive in the mail after your visit with us. Thank you!             Your Updated Medication List - Protect others around you: Learn how to safely use, store and throw away your medicines at www.disposemymeds.org.          This list is accurate as of: 2/22/17 11:23 AM.  Always use your most recent med list.                   Brand Name Dispense Instructions for use    allopurinol 100 MG tablet    ZYLOPRIM    30 tablet    Take 1 tablet (100 mg) by mouth daily       aspirin 81 MG tablet      Take 1 tablet by mouth daily.       bacitracin ophthalmic ointment     1 g    Place 1 Application into the right eye 3 times daily for 5 days Apply thin ribbon to the lower eyelid as directed.       clopidogrel 75 MG tablet    PLAVIX    90 tablet    Take 1 tablet (75 mg) by mouth daily       cyanocobalamin 1000 MCG tablet    vitamin  B-12     Take 1 tablet by mouth daily. As directed       * furosemide 20 MG tablet    LASIX    90 tablet    Take 2 tablets (40 mg) by mouth daily       * furosemide 40 MG tablet    LASIX         gabapentin 100 MG capsule    NEURONTIN    90 capsule    Take 1 capsule (100 mg) by mouth 3 times daily        hydrALAZINE 100 MG Tabs tablet    APRESOLINE    270 tablet    Take 1 tablet (100 mg) by mouth 3 times daily       isosorbide mononitrate 20 MG tablet    ISMO/MONOKET    540 tablet    Take 2 tablets (40 mg) by mouth 3 times daily Please see Dr. Mir for further refills.       itraconazole 100 MG capsule    SPORANOX    180 capsule    Take 2 capsules (200 mg) by mouth daily       metoprolol 100 MG tablet    LOPRESSOR    180 tablet    TAKE ONE TABLET BY MOUTH TWICE DAILY       multivitamin  with lutein Caps per capsule      Take 1 capsule by mouth daily       nitroglycerin 0.4 MG sublingual tablet    NITROSTAT    25 tablet    Place 1 tablet (0.4 mg) under the tongue See Admin Instructions If no relief after 3rd dose, call 911.       omeprazole 40 MG capsule    priLOSEC    90 capsule    TAKE ONE CAPSULE BY MOUTH ONCE DAILY       order for DME     1 each    Equipment being ordered: Left hand and wrist brace       potassium chloride 10 MEQ tablet    K-TAB,KLOR-CON    180 tablet    Take 10 mEq by mouth 2 times daily       pravastatin 80 MG tablet    PRAVACHOL    90 tablet    TAKE ONE TABLET BY MOUTH ONCE DAILY       timolol 0.5 % ophthalmic solution    TIMOPTIC         * Notice:  This list has 2 medication(s) that are the same as other medications prescribed for you. Read the directions carefully, and ask your doctor or other care provider to review them with you.

## 2017-02-22 NOTE — PROGRESS NOTES
Some of this note was populated by a medical assistant.      SUBJECTIVE:                                                    Tessa Mansfield is a 81 year old female who presents to clinic today for the following health issues:    Eye(s) Problem      Duration: Monday    Description:  Location: right  Pain: no   Redness: Light pink  Discharge: YES    Accompanying signs and symptoms: Itchy    History (Trauma, foreign body exposure,): None    Precipitating or alleviating factors (contact use): None    Therapies tried and outcome: None       Problem list and histories reviewed & adjusted, as indicated.  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     Transient complete heart block (H)     Sinus node dysfunction (H)     Disturbance of skin sensation     NSTEMI (non-ST elevated myocardial infarction) (H)     Arrhythmia     Chest pain     Past Surgical History   Procedure Laterality Date     Hysterectomy       Cholecystectomy       Hc ppm insertion new/replacement w/ atrial&ventricular lead  11-       Social History   Substance Use Topics     Smoking status: Former Smoker     Packs/day: 0.30     Years: 15.00     Types: Cigarettes     Smokeless tobacco: Never Used      Comment: Quit 22+ years ago     Alcohol use Not on file     Family History   Problem Relation Age of Onset     CANCER Sister 82     bladder cancer         Current Outpatient Prescriptions   Medication Sig Dispense Refill     allopurinol (ZYLOPRIM) 100 MG tablet Take 1 tablet (100 mg) by mouth daily 30 tablet 3     furosemide  "(LASIX) 40 MG tablet        order for DME Equipment being ordered: Left hand and wrist brace 1 each 0     itraconazole (SPORANOX) 100 MG capsule Take 2 capsules (200 mg) by mouth daily 180 capsule 0     metoprolol (LOPRESSOR) 100 MG tablet TAKE ONE TABLET BY MOUTH TWICE DAILY 180 tablet 3     gabapentin (NEURONTIN) 100 MG capsule Take 1 capsule (100 mg) by mouth 3 times daily 90 capsule 5     furosemide (LASIX) 20 MG tablet Take 2 tablets (40 mg) by mouth daily 90 tablet 3     omeprazole (PRILOSEC) 40 MG capsule TAKE ONE CAPSULE BY MOUTH ONCE DAILY 90 capsule 3     pravastatin (PRAVACHOL) 80 MG tablet TAKE ONE TABLET BY MOUTH ONCE DAILY 90 tablet 3     hydrALAZINE (APRESOLINE) 100 MG TABS Take 1 tablet (100 mg) by mouth 3 times daily 270 tablet 3     potassium chloride (K-DUR) 10 MEQ tablet Take 10 mEq by mouth 2 times daily  180 tablet 1     timolol (TIMOPTIC) 0.5 % ophthalmic solution        clopidogrel (PLAVIX) 75 MG tablet Take 1 tablet (75 mg) by mouth daily 90 tablet 3     isosorbide mononitrate (ISMO,MONOKET) 20 MG tablet Take 2 tablets (40 mg) by mouth 3 times daily Please see Dr. Mir for further refills. 540 tablet 3     nitroglycerin (NITROSTAT) 0.4 MG SL tablet Place 1 tablet (0.4 mg) under the tongue See Admin Instructions If no relief after 3rd dose, call 911. 25 tablet 6     multivitamin  with lutein (OCUVITE WITH LTEIN) CAPS Take 1 capsule by mouth daily       aspirin 81 MG tablet Take 1 tablet by mouth daily.       cyanocolbalamin (VITAMIN B-12) 1000 MCG tablet Take 1 tablet by mouth daily. As directed       Allergies   Allergen Reactions     Bactrim [Sulfamethoxazole W/Trimethoprim] Other (See Comments)     Patient unable to recall     Biaxin [Clarithromycin]      Chlorthalidone Nausea and Vomiting     Clonidine      Codeine Sulfate Itching     Darvon [Propoxyphene Hcl]      Gabapentin Other (See Comments) and Fatigue     \"felt drunk\"     Indocin      Levaquin [Levofloxacin Hemihydrate]      Lyrica " Fatigue     Morphine Sulfate      Percocet [Oxycodone-Acetaminophen]      Spironolactone Other (See Comments)     Dehydrated       Terazosin      Ciprofloxacin Rash     Simvastatin Palpitations     Muscle weakness, leg cramping       ROS:  Constitutional, HEENT, cardiovascular, pulmonary, gi and gu systems are negative, except as otherwise noted.    OBJECTIVE:                                                    /68  Pulse 81  Temp 97.5  F (36.4  C) (Oral)  Wt 158 lb 12.8 oz (72 kg)  SpO2 96%  BMI 26.43 kg/m2  Body mass index is 26.43 kg/(m^2).  GENERAL: healthy, alert and no distress  NECK: no adenopathy, no asymmetry, masses, or scars and thyroid normal to palpation  PERRLA, EOMI, mild conjunctival injection right eye without, cornea clear without appreciable chemosis   RESP: lungs clear to auscultation - no rales, rhonchi or wheezes  CV: regular rate and rhythm, normal S1 S2, no S3 or S4, no murmur, click or rub, no peripheral edema and peripheral pulses strong  ABDOMEN: soft, nontender, no hepatosplenomegaly, no masses and bowel sounds normal  MS: no gross musculoskeletal defects noted, no edema    Diagnostic Test Results:  none      ASSESSMENT/PLAN:                                                        ICD-10-CM    1. Unspecified conjunctivitis H10.9 bacitracin ophthalmic ointment        PLAN  Trial of artificial tears  Patient educational/instructional material provided including reasons for follow-up   Yovani Villanueva MD      [facility]

## 2017-02-22 NOTE — NURSING NOTE
"Chief Complaint   Patient presents with     Eye Problem     possible for pink eye, Monday       Initial /68  Pulse 81  Temp 97.5  F (36.4  C) (Oral)  Wt 158 lb 12.8 oz (72 kg)  SpO2 96%  BMI 26.43 kg/m2 Estimated body mass index is 26.43 kg/(m^2) as calculated from the following:    Height as of 1/23/17: 5' 5\" (1.651 m).    Weight as of this encounter: 158 lb 12.8 oz (72 kg).  Medication Reconciliation: complete   Jackie You MA        "

## 2017-03-20 DIAGNOSIS — I21.4 NSTEMI (NON-ST ELEVATED MYOCARDIAL INFARCTION) (H): ICD-10-CM

## 2017-03-20 DIAGNOSIS — Z95.820 S/P ANGIOPLASTY WITH STENT: ICD-10-CM

## 2017-03-20 DIAGNOSIS — I25.118 CORONARY ARTERY DISEASE WITH OTHER FORM OF ANGINA PECTORIS: ICD-10-CM

## 2017-03-21 ENCOUNTER — ALLIED HEALTH/NURSE VISIT (OUTPATIENT)
Dept: CARDIOLOGY | Facility: CLINIC | Age: 82
End: 2017-03-21
Attending: INTERNAL MEDICINE
Payer: COMMERCIAL

## 2017-03-21 DIAGNOSIS — I49.5 SINUS NODE DYSFUNCTION (H): Primary | ICD-10-CM

## 2017-03-21 PROCEDURE — 93294 REM INTERROG EVL PM/LDLS PM: CPT | Performed by: INTERNAL MEDICINE

## 2017-03-21 PROCEDURE — 93296 REM INTERROG EVL PM/IDS: CPT | Mod: ZF

## 2017-03-21 RX ORDER — CLOPIDOGREL BISULFATE 75 MG/1
75 TABLET ORAL DAILY
Qty: 90 TABLET | Refills: 2 | Status: SHIPPED | OUTPATIENT
Start: 2017-03-21 | End: 2017-12-21

## 2017-03-21 NOTE — TELEPHONE ENCOUNTER
clopidogrel (PLAVIX) 75 MG       Last Written Prescription Date:  12/18/15  Last Fill Quantity: 90,   # refills: 3  Last Office Visit : 1/18/17  Future Office visit:  NONE  Creatinine   Date Value Ref Range Status   11/07/2016 1.99 (H) 0.52 - 1.04 mg/dL Final     Creatinine For Methaneph 24 H/Random Urine   Date Value Ref Range Status   09/20/2011 63  Final     Comment:     Unit: mg/dL   CBC RESULTS:   Recent Labs   Lab Test  11/07/16   1310   03/24/16   1722   WBC   --    --   10.0   RBC   --    --   4.17   HGB  10.5*   < >  11.8   HCT   --    --   38.0   MCV   --    --   91   MCH   --    --   28.3   MCHC   --    --   31.1*   RDW   --    --   13.2   PLT   --    --   226    < > = values in this interval not displayed.

## 2017-03-21 NOTE — MR AVS SNAPSHOT
After Visit Summary   3/21/2017    Tessa Mansfield    MRN: [id number]           Patient Information     Date Of Birth          1935        Visit Information        Provider Department      3/21/2017 6:00 AM  ICD REMOTE [facility]        Today's Diagnoses     Sinus node dysfunction (H)    -  1       Follow-ups after your visit        Your next 10 appointments already scheduled     Apr 13, 2017  3:00 PM CDT   (Arrive by 2:45 PM)   NEW FEMALE PELVIC with Adriana See Jatin Lilly MD   [facility] Urology and [facility] for Prostate and Urologic Cancers ([facility])    [address] [place] [address]  4th Floor  [employer] [id number] [phone]            Nov 08, 2017 10:00 AM CST   Lab with [facility] LAB   [facility] Lab ([facility])    [address] [place] [address]  1st [facility] [id number] [phone]            Nov 08, 2017 11:10 AM CST   (Arrive by 10:40 AM)   Return Visit with Marzena Martin MD   [facility] Nephrology ([facility])    [address] [place] [address]  3rd [facility] [id number] [phone]              Who to contact     If you have questions or need follow up information about today's clinic visit or your schedule please contact [facility] directly at [phone].  Normal or non-critical lab and imaging results will be communicated to you by SNRLabshart, letter or phone within 4 business days after the clinic has received the results. If you do not hear from us within 7 days, please contact the clinic through SNRLabshart or phone. If you have a critical or abnormal lab result, we will notify you by phone as soon as possible.  Submit refill requests through [employer] or call your pharmacy and they will forward the refill request to us. Please allow 3 business days for your refill to be completed.          Additional Information About Your Visit        SNRLabsharShopReply Information     [employer] lets you send messages  "to your doctor, view your test results, renew your prescriptions, schedule appointments and more. To sign up, go to [email or web address] . Click on \"Log in\" on the left side of the screen, which will take you to the Welcome page. Then click on \"Sign up Now\" on the right side of the page.     You will be asked to enter the access code listed below, as well as some personal information. Please follow the directions to create your username and password.     Your access code is: [email or web address]  Expires: 2017  3:24 PM     Your access code will  in 90 days. If you need help or a new code, please call your [facility] clinic or [phone].        Care EveryWhere ID     This is your Care EveryWhere ID. This could be used by other organizations to access your [facility] medical records  [id number]         Blood Pressure from Last 3 Encounters:   17 136/68   17 143/66   17 163/65    Weight from Last 3 Encounters:   17 72 kg (158 lb 12.8 oz)   17 74.8 kg (165 lb)   17 75.3 kg (166 lb)              We Performed the Following     INTERROGATION DEVICE EVAL REMOTE, PACER/ICD        Primary Care Provider Office Phone # Fax #    Pedro Gomez [phone]       PR[facility] [address] [place] [address]        Thank you!     Thank you for choosing [facility]  for your care. Our goal is always to provide you with excellent care. Hearing back from our patients is one way we can continue to improve our services. Please take a few minutes to complete the written survey that you may receive in the mail after your visit with us. Thank you!             Your Updated Medication List - Protect others around you: Learn how to safely use, store and throw away your medicines at www.disposemymeds.org.          This list is accurate as of: 3/21/17  2:37 PM.  Always use your most recent med list.                   Brand Name Dispense Instructions for " use    allopurinol 100 MG tablet    ZYLOPRIM    30 tablet    Take 1 tablet (100 mg) by mouth daily       aspirin 81 MG tablet      Take 1 tablet by mouth daily.       clopidogrel 75 MG tablet    PLAVIX    90 tablet    Take 1 tablet (75 mg) by mouth daily       cyanocobalamin 1000 MCG tablet    vitamin  B-12     Take 1 tablet by mouth daily. As directed       * furosemide 20 MG tablet    LASIX    90 tablet    Take 2 tablets (40 mg) by mouth daily       * furosemide 40 MG tablet    LASIX         gabapentin 100 MG capsule    NEURONTIN    90 capsule    Take 1 capsule (100 mg) by mouth 3 times daily       hydrALAZINE 100 MG Tabs tablet    APRESOLINE    270 tablet    Take 1 tablet (100 mg) by mouth 3 times daily       isosorbide mononitrate 20 MG tablet    ISMO/MONOKET    540 tablet    Take 2 tablets (40 mg) by mouth 3 times daily Please see Dr. Mir for further refills.       itraconazole 100 MG capsule    SPORANOX    180 capsule    Take 2 capsules (200 mg) by mouth daily       metoprolol 100 MG tablet    LOPRESSOR    180 tablet    TAKE ONE TABLET BY MOUTH TWICE DAILY       multivitamin  with lutein Caps per capsule      Take 1 capsule by mouth daily       nitroglycerin 0.4 MG sublingual tablet    NITROSTAT    25 tablet    Place 1 tablet (0.4 mg) under the tongue See Admin Instructions If no relief after 3rd dose, call 911.       omeprazole 40 MG capsule    priLOSEC    90 capsule    TAKE ONE CAPSULE BY MOUTH ONCE DAILY       order for DME     1 each    Equipment being ordered: Left hand and wrist brace       potassium chloride 10 MEQ tablet    K-TAB,KLOR-CON    180 tablet    Take 10 mEq by mouth 2 times daily       pravastatin 80 MG tablet    PRAVACHOL    90 tablet    TAKE ONE TABLET BY MOUTH ONCE DAILY       timolol 0.5 % ophthalmic solution    TIMOPTIC         * Notice:  This list has 2 medication(s) that are the same as other medications prescribed for you. Read the directions carefully, and ask your doctor or other  care provider to review them with you.

## 2017-03-21 NOTE — PROGRESS NOTES
Remote pacemaker transmission received and reviewed.  Device transmission sent per MD orders.  Patient has a Medtronic dual lead pacemaker.  Normal pacemaker function.  No episodes recorded.  Presenting EGM = AP-VS @ 65 bpm.  AP = 80.3%.   = 0.1%.  Estimated battery longevity to LOLA = 3 years.  Patient notified of interrogation results.  Patient reports that she is feeling well and denies specific complaints.  Plan for patient to send a remote transmission in 3 months as scheduled.    Remote pacemaker transmission

## 2017-03-29 DIAGNOSIS — E87.6 HYPOKALEMIA: ICD-10-CM

## 2017-03-29 RX ORDER — POTASSIUM CHLORIDE 750 MG/1
10 TABLET, EXTENDED RELEASE ORAL 2 TIMES DAILY
Qty: 180 TABLET | Refills: 2 | Status: SHIPPED | OUTPATIENT
Start: 2017-03-29 | End: 2017-11-08

## 2017-03-30 DIAGNOSIS — I25.118 CORONARY ARTERY DISEASE WITH OTHER FORM OF ANGINA PECTORIS: ICD-10-CM

## 2017-03-30 RX ORDER — ISOSORBIDE MONONITRATE 20 MG/1
40 TABLET ORAL 3 TIMES DAILY
Qty: 540 TABLET | Refills: 3 | Status: SHIPPED | OUTPATIENT
Start: 2017-03-30 | End: 2018-05-11

## 2017-04-10 DIAGNOSIS — I25.10 CAD (CORONARY ARTERY DISEASE): ICD-10-CM

## 2017-04-10 RX ORDER — NITROGLYCERIN 0.4 MG/1
TABLET SUBLINGUAL
Qty: 25 TABLET | Refills: 3 | Status: SHIPPED | OUTPATIENT
Start: 2017-04-10 | End: 2021-03-15

## 2017-04-12 ENCOUNTER — PRE VISIT (OUTPATIENT)
Dept: UROLOGY | Facility: CLINIC | Age: 82
End: 2017-04-12

## 2017-04-12 NOTE — TELEPHONE ENCOUNTER
Patient with incontinence coming in for a consult. Chart reviewed and all records available. Pt called and asked to come with a full bladder for a dip/pvr.

## 2017-04-13 ENCOUNTER — OFFICE VISIT (OUTPATIENT)
Dept: UROLOGY | Facility: CLINIC | Age: 82
End: 2017-04-13

## 2017-04-13 VITALS
HEART RATE: 64 BPM | SYSTOLIC BLOOD PRESSURE: 123 MMHG | DIASTOLIC BLOOD PRESSURE: 62 MMHG | HEIGHT: 65 IN | BODY MASS INDEX: 26.19 KG/M2 | WEIGHT: 157.2 LBS

## 2017-04-13 DIAGNOSIS — M79.18 MYALGIA OF PELVIC FLOOR: ICD-10-CM

## 2017-04-13 DIAGNOSIS — N39.3 FEMALE STRESS INCONTINENCE: Primary | ICD-10-CM

## 2017-04-13 DIAGNOSIS — Z80.52 FAMILY HISTORY OF BLADDER CANCER: ICD-10-CM

## 2017-04-13 DIAGNOSIS — M62.89 PELVIC FLOOR DYSFUNCTION: ICD-10-CM

## 2017-04-13 LAB
APPEARANCE UR: CLEAR
BILIRUB UR QL: NORMAL
COLOR UR: YELLOW
GLUCOSE URINE: NORMAL MG/DL
HGB UR QL: NORMAL
KETONES UR QL: NORMAL MG/DL
LEUKOCYTE ESTERASE URINE: NORMAL
NITRITE UR QL STRIP: NORMAL
PH UR STRIP: 6 PH (ref 5–7)
PROTEIN ALBUMIN URINE: NORMAL MG/DL
SOURCE: NORMAL
SP GR UR STRIP: 1.01 (ref 1–1.03)
UROBILINOGEN UR QL STRIP: 0.2 EU/DL (ref 0.2–1)

## 2017-04-13 ASSESSMENT — ENCOUNTER SYMPTOMS
TINGLING: 0
HEMATURIA: 0
NUMBNESS: 0
DYSURIA: 0
SEIZURES: 0
LEG PAIN: 0
LEG SWELLING: 0
HEADACHES: 0
DISTURBANCES IN COORDINATION: 0
DOUBLE VISION: 0
PALPITATIONS: 0
LIGHT-HEADEDNESS: 0
SLEEP DISTURBANCES DUE TO BREATHING: 0
LOSS OF CONSCIOUSNESS: 0
FLANK PAIN: 0
SYNCOPE: 0
HYPERTENSION: 1
HYPOTENSION: 0
EYE WATERING: 1
TREMORS: 0
TACHYCARDIA: 0
PARALYSIS: 0
MEMORY LOSS: 0
EYE PAIN: 0
WEAKNESS: 0
DIZZINESS: 0
EYE IRRITATION: 0
CLAUDICATION: 0
EXERCISE INTOLERANCE: 0
DIFFICULTY URINATING: 0
ORTHOPNEA: 0
SPEECH CHANGE: 0
EYE REDNESS: 0

## 2017-04-13 ASSESSMENT — PAIN SCALES - GENERAL: PAINLEVEL: NO PAIN (0)

## 2017-04-13 NOTE — LETTER
2017       RE: Tessa Mansfield  [address]  [facility] [address]     Dear Colleague,    Thank you for referring your patient, Tessa Mansfield, to the [facility] UROLOGY AND INST FOR PROSTATE AND UROLOGIC CANCERS at [facility]. Please see a copy of my visit note below.    We are pleased to see Ms. Tessa Mansfield in consultation at the request of Pedro Gomez for the evaluation of chief complaint listed below    Chief Complaint:    Urinary incontinence         History of Present Illness:   Tessa Mansfield is a(n) 82 year old female w/ PMH of CAD s/p multiple stents, CKD, KEVYN and no previous urologic history who is referred for urinary incontinence.    She first noticed it a few months ago and has been getting worse.  It is associated with laughing, sneezing, and any bending.  She has 1-2 episodes of incontinence per day and has been using 1 pads per day.  She has not had any previous treatment for her condition.  The patient denies significant frequency, urgency, or urge incontinence.  She denies any dysuria, nocturia, hematuria, pyuria, hesitency, intermittency, feeling of incomplete emptying, or any recent hx of UTI's. She also notes that her sister had a history of bladder cancer that started as urinary incontinence and recurrent UTIs so she is very concerned about this possibility.    She is not sexually active and denies any pelvic pain.   She denies any vaginal bulge.      Obstetric Hx:  She is .  Babies were deliever by vaginal delivery, uncomplicated. Postmenopausal           Past Medical History:     Past Medical History:   Diagnosis Date     CAD (coronary artery disease)      Cardiac Pacemaker- Medtronic, dual chamber- NOT dependant 2007     CKD (chronic kidney disease), stage IV (H)      Hyperlipidemia LDL goal <70      Hypertension      Stented coronary artery 10-    RCA     Stented coronary artery 2003    LCx     Stented  "coronary artery 4-    LAD     Stented coronary artery 11-    LAD     Stented coronary artery 12-    RCA     Transient complete heart block (H) 11/28/2007            Past Surgical History:     Past Surgical History:   Procedure Laterality Date     CHOLECYSTECTOMY       HC PPM INSERTION NEW/REPLACEMENT W/ ATRIAL&VENTRICULAR LEAD  11-     HYSTERECTOMY              Social History:     Social History     Social History     Marital status:      Spouse name: N/A     Number of children: 4     Years of education: N/A     Occupational History      [employer]     Social History Main Topics     Smoking status: Former Smoker     Packs/day: 0.30     Years: 15.00     Types: Cigarettes     Smokeless tobacco: Never Used      Comment: Quit 22+ years ago     Alcohol use Not on file     Drug use: No     Sexual activity: Yes     Partners: Female     Birth control/ protection: Post-menopausal     Other Topics Concern     Blood Transfusions Yes     Exercise No     Social History Narrative            Family History:     Family History   Problem Relation Age of Onset     CANCER Sister 82     bladder cancer     No urologic cancers in the family.         Allergies:     Allergies   Allergen Reactions     Bactrim [Sulfamethoxazole W/Trimethoprim] Other (See Comments)     Patient unable to recall     Biaxin [Clarithromycin]      Chlorthalidone Nausea and Vomiting     Clonidine      Codeine Sulfate Itching     Darvon [Propoxyphene Hcl]      Gabapentin Other (See Comments) and Fatigue     \"felt drunk\"     Indocin      Levaquin [Levofloxacin Hemihydrate]      Lyrica Fatigue     Morphine Sulfate      Percocet [Oxycodone-Acetaminophen]      Spironolactone Other (See Comments)     Dehydrated       Terazosin      Ciprofloxacin Rash     Simvastatin Palpitations     Muscle weakness, leg cramping              Medications:     Current Outpatient Prescriptions   Medication Sig     NITROSTAT 0.4 MG sublingual " "tablet DISSOLVE ONE TABLET UNDER THE TONGUE EVERY 5 MINUTES AS NEEDED FOR CHEST PAIN.  DO NOT EXCEED A TOTAL OF 3 DOSES IN 15 MINUTES. CALL 911.     isosorbide mononitrate (ISMO/MONOKET) 20 MG tablet Take 2 tablets (40 mg) by mouth 3 times daily     potassium chloride (K-TAB,KLOR-CON) 10 MEQ tablet Take 1 tablet (10 mEq) by mouth 2 times daily     clopidogrel (PLAVIX) 75 MG tablet Take 1 tablet (75 mg) by mouth daily     allopurinol (ZYLOPRIM) 100 MG tablet Take 1 tablet (100 mg) by mouth daily     furosemide (LASIX) 40 MG tablet      order for DME Equipment being ordered: Left hand and wrist brace     itraconazole (SPORANOX) 100 MG capsule Take 2 capsules (200 mg) by mouth daily     metoprolol (LOPRESSOR) 100 MG tablet TAKE ONE TABLET BY MOUTH TWICE DAILY     omeprazole (PRILOSEC) 40 MG capsule TAKE ONE CAPSULE BY MOUTH ONCE DAILY     pravastatin (PRAVACHOL) 80 MG tablet TAKE ONE TABLET BY MOUTH ONCE DAILY     hydrALAZINE (APRESOLINE) 100 MG TABS Take 1 tablet (100 mg) by mouth 3 times daily     timolol (TIMOPTIC) 0.5 % ophthalmic solution      multivitamin  with lutein (OCUVITE WITH LTEIN) CAPS Take 1 capsule by mouth daily     aspirin 81 MG tablet Take 1 tablet by mouth daily.     cyanocolbalamin (VITAMIN B-12) 1000 MCG tablet Take 1 tablet by mouth daily. As directed     [DISCONTINUED] furosemide (LASIX) 20 MG tablet Take 2 tablets (40 mg) by mouth daily     No current facility-administered medications for this visit.             REVIEW OF SYSTEMS:    See HPI for pertinent details.  Remainder of 10-point ROS negative.         PHYSICAL EXAM   /62  Pulse 64  Ht 1.651 m (5' 5\")  Wt 71.3 kg (157 lb 3.2 oz)  BMI 26.16 kg/m2  GENERAL: No acute distress. Well nourished.   HEENT:  Sclerae anicteric.  Conjunctivae pink.  Moist mucous membranes.  NECK:  Supple.  No lymphadenopathy.  CARDIAC:  Regular rate and rhythm.  LUNGS:  Non-labored breathing  BACK:  No costovertebral tenderness.  ABDOMEN: Soft, non-tender, " no surgical scars, no organomegaly, non-tender.  :  Normal external genitalia and introitus, atrophic changes noted          Stress incontinence not demonstrated with coughing          No cystocele or rectocele          Cervix is surgically absent          Pelvic floor muscles of normal tonicity, tender to palpation  Rectum and perineum WNL, no palpable masses, good rectal tone.    SKIN: No rashes.  Dry.     EXTREMITIES:  Warm, well perfused.  No lower extremity edema bilaterally.  NEURO: normal gait, no focal deficits.     PVR: 18 ml          LABS AND IMAGING:   Urine dipstick: Unremarkable          ASSESSMENT:   Tessa Mansfield is a 82 year old female who presents for stress urinary incontinence, myofascial tenderness of the pelvic floor, pelvic floor dysfunction, family history of bladder cancer.  Different management options were discussed with the patient including observation, Kegel exercises, biofeedback, and surgery. Given her pelvic floor tenderness on exam would likely benefit most from PT.  Also given the acute onset of her ANSHU will workup for possible infection as etiology.  Finally, given her sister's history will rule out cancer as etiology            PLAN:     Send urine culture, ureaplasma, mycoplasma cultures    Referral to pelvic floor PT    Send urine cytology. Return for cystoscopic examination next available date.    Dane Vega MD  Urology Resident    Patient was seen and examined with Dr. Lilly    Addendum:    I, Adriana Lilly, saw and examined the patient with the resident team.  I agree with the note above with changes made as necessary.    About 25 minutes were spent with patient today, > 50% in counseling and coordination of care    Adriana Lilly MD MPH   of Urology    CC  Patient Care Team:  Pedro Gomez MD as PCP - General (Family Practice)  Aishwarya Mir MD as MD (Internal Medicine)  Bob Bowman MD as MD (Nephrology)  Adriana Lilly MD  as MD (Urology)  DIALLO RITTER    Copy to patient  LUCIO RAHEEM VALERIO  [address] [facility] [address]

## 2017-04-13 NOTE — PATIENT INSTRUCTIONS
Websites with free information:    [employer] patient website: www.voicesforpfd.org    Total Control Program: www.totalcontrolprogram.com    Please see one of the dedicated pelvic floor physical therapist ([facility] [phone])    Please return to see us for a cystoscopy (look in the bladder)    It was a pleasure meeting with you today.  Thank you for allowing me and my team the privilege of caring for you today.  YOU are the reason we are here, and I truly hope we provided you with the excellent service you deserve.  Please let us know if there is anything else we can do for you so that we can be sure you are leaving completely satisfied with your care experience.

## 2017-04-13 NOTE — MR AVS SNAPSHOT
After Visit Summary   4/13/2017    Tessa Mansfield    MRN: [id number]           Patient Information     Date Of Birth          1935        Visit Information        Provider Department      4/13/2017 3:00 PM Adriana Lilly MD [facility] Urology and [facility] for Prostate and Urologic Cancers        Today's Diagnoses     Female stress incontinence    -  1       Follow-ups after your visit        Your next 10 appointments already scheduled     May 04, 2017  8:45 AM CDT   (Arrive by 8:30 AM)   Cystoscopy with Adriana Lilly MD   [facility] and [facility] for Prostate and Urologic Cancers ([facility])    [address] [place] [address]  4th Floor  [facility] [address] [phone]            Nov 08, 2017 10:00 AM CST   Lab with  LAB   [facility] ([facility])    [address] [place] [address]  1st Floor  [facility] [address] [phone]            Nov 08, 2017 11:10 AM CST   (Arrive by 10:40 AM)   Return Visit with Marzena Martin MD   [facility] Nephrology ([facility])    [address] [place] [address]  3rd [facility] [address] [phone]              Future tests that were ordered for you today     Open Future Orders        Priority Expected Expires Ordered    Urine Culture Aerobic Bacterial Routine  4/13/2018 4/13/2017    Ureaplasma culture Routine  4/30/2017 4/13/2017    Mycoplasma large colony culture Routine  4/14/2018 4/13/2017            Who to contact     Please call your clinic at [phone] to:    Ask questions about your health    Make or cancel appointments    Discuss your medicines    Learn about your test results    Speak to your doctor   If you have compliments or concerns about an experience at your clinic, or if you wish to file a complaint, please contact [facility] Patient Relations at [phone] or email us at [email or web address]         Additional  "Information About Your Visit        [employer] Information     [employer] is an electronic gateway that provides easy, online access to your medical records. With [employer], you can request a clinic appointment, read your test results, renew a prescription or communicate with your care team.     To sign up for [employer] visit the website at [email or web address]   You will be asked to enter the access code listed below, as well as some personal information. Please follow the directions to create your username and password.     Your access code is: [email or web address]  Expires: 2017  4:42 PM     Your access code will  in 90 days. If you need help or a new code, please contact your [facility] or call [phone] for assistance.        Care EveryWhere ID     This is your Care EveryWhere ID. This could be used by other organizations to access your [facility] medical records  [id number]        Your Vitals Were     Pulse Height BMI (Body Mass Index)             64 1.651 m (5' 5\") 26.16 kg/m2          Blood Pressure from Last 3 Encounters:   17 123/62   17 136/68   17 143/66    Weight from Last 3 Encounters:   17 71.3 kg (157 lb 3.2 oz)   17 72 kg (158 lb 12.8 oz)   17 74.8 kg (165 lb)              We Performed the Following     Urinalysis chemical screen POCT          Today's Medication Changes          These changes are accurate as of: 17  4:42 PM.  If you have any questions, ask your nurse or doctor.               Stop taking these medicines if you haven't already. Please contact your care team if you have questions.     gabapentin 100 MG capsule   Commonly known as:  NEURONTIN   Stopped by:  Adriana Lilly MD                    Primary Care Provider Office Phone # Fax #    Pedro Gomez [phone]       [facility] [address] [place] [id number]        Thank you!     Thank you for choosing M " [facility] UROLOGY AND [facility] FOR PROSTATE AND UROLOGIC CANCERS  for your care. Our goal is always to provide you with excellent care. Hearing back from our patients is one way we can continue to improve our services. Please take a few minutes to complete the written survey that you may receive in the mail after your visit with us. Thank you!             Your Updated Medication List - Protect others around you: Learn how to safely use, store and throw away your medicines at www.disposemymeds.org.          This list is accurate as of: 4/13/17  4:42 PM.  Always use your most recent med list.                   Brand Name Dispense Instructions for use    allopurinol 100 MG tablet    ZYLOPRIM    30 tablet    Take 1 tablet (100 mg) by mouth daily       aspirin 81 MG tablet      Take 1 tablet by mouth daily.       clopidogrel 75 MG tablet    PLAVIX    90 tablet    Take 1 tablet (75 mg) by mouth daily       cyanocobalamin 1000 MCG tablet    vitamin  B-12     Take 1 tablet by mouth daily. As directed       furosemide 40 MG tablet    LASIX         hydrALAZINE 100 MG Tabs tablet    APRESOLINE    270 tablet    Take 1 tablet (100 mg) by mouth 3 times daily       isosorbide mononitrate 20 MG tablet    ISMO/MONOKET    540 tablet    Take 2 tablets (40 mg) by mouth 3 times daily       itraconazole 100 MG capsule    SPORANOX    180 capsule    Take 2 capsules (200 mg) by mouth daily       metoprolol 100 MG tablet    LOPRESSOR    180 tablet    TAKE ONE TABLET BY MOUTH TWICE DAILY       multivitamin  with lutein Caps per capsule      Take 1 capsule by mouth daily       NITROSTAT 0.4 MG sublingual tablet   Generic drug:  nitroglycerin     25 tablet    DISSOLVE ONE TABLET UNDER THE TONGUE EVERY 5 MINUTES AS NEEDED FOR CHEST PAIN.  DO NOT EXCEED A TOTAL OF 3 DOSES IN 15 MINUTES. CALL 911.       omeprazole 40 MG capsule    priLOSEC    90 capsule    TAKE ONE CAPSULE BY MOUTH ONCE DAILY       order for DME     1 each    Equipment being ordered:  Left hand and wrist brace       potassium chloride 10 MEQ tablet    K-TAB,KLOR-CON    180 tablet    Take 1 tablet (10 mEq) by mouth 2 times daily       pravastatin 80 MG tablet    PRAVACHOL    90 tablet    TAKE ONE TABLET BY MOUTH ONCE DAILY       timolol 0.5 % ophthalmic solution    TIMOPTIC

## 2017-04-13 NOTE — PROGRESS NOTES
We are pleased to see Ms. Tessa Mansfield in consultation at the request of Pedro Gomez for the evaluation of chief complaint listed below    Chief Complaint:    Urinary incontinence         History of Present Illness:   Tessa Mansfield is a(n) 82 year old female w/ PMH of CAD s/p multiple stents, CKD, KEVYN and no previous urologic history who is referred for urinary incontinence.    She first noticed it a few months ago and has been getting worse.  It is associated with laughing, sneezing, and any bending.  She has 1-2 episodes of incontinence per day and has been using 1 pads per day.  She has not had any previous treatment for her condition.  The patient denies significant frequency, urgency, or urge incontinence.  She denies any dysuria, nocturia, hematuria, pyuria, hesitency, intermittency, feeling of incomplete emptying, or any recent hx of UTI's. She also notes that her sister had a history of bladder cancer that started as urinary incontinence and recurrent UTIs so she is very concerned about this possibility.    She is not sexually active and denies any pelvic pain.   She denies any vaginal bulge.      Obstetric Hx:  She is .  Babies were deliever by vaginal delivery, uncomplicated. Postmenopausal           Past Medical History:     Past Medical History:   Diagnosis Date     CAD (coronary artery disease)      Cardiac Pacemaker- Medtronic, dual chamber- NOT dependant 2007     CKD (chronic kidney disease), stage IV (H)      Hyperlipidemia LDL goal <70      Hypertension      Stented coronary artery 10-    RCA     Stented coronary artery 2003    LCx     Stented coronary artery 2003    LAD     Stented coronary artery 2007    LAD     Stented coronary artery 2007    RCA     Transient complete heart block (H) 2007            Past Surgical History:     Past Surgical History:   Procedure Laterality Date     CHOLECYSTECTOMY       HC PPM INSERTION NEW/REPLACEMENT W/  "ATRIAL&VENTRICULAR LEAD  11-     HYSTERECTOMY              Social History:     Social History     Social History     Marital status:      Spouse name: N/A     Number of children: 4     Years of education: N/A     Occupational History      [employer]     Social History Main Topics     Smoking status: Former Smoker     Packs/day: 0.30     Years: 15.00     Types: Cigarettes     Smokeless tobacco: Never Used      Comment: Quit 22+ years ago     Alcohol use Not on file     Drug use: No     Sexual activity: Yes     Partners: Female     Birth control/ protection: Post-menopausal     Other Topics Concern     Blood Transfusions Yes     Exercise No     Social History Narrative            Family History:     Family History   Problem Relation Age of Onset     CANCER Sister 82     bladder cancer     No urologic cancers in the family.         Allergies:     Allergies   Allergen Reactions     Bactrim [Sulfamethoxazole W/Trimethoprim] Other (See Comments)     Patient unable to recall     Biaxin [Clarithromycin]      Chlorthalidone Nausea and Vomiting     Clonidine      Codeine Sulfate Itching     Darvon [Propoxyphene Hcl]      Gabapentin Other (See Comments) and Fatigue     \"felt drunk\"     Indocin      Levaquin [Levofloxacin Hemihydrate]      Lyrica Fatigue     Morphine Sulfate      Percocet [Oxycodone-Acetaminophen]      Spironolactone Other (See Comments)     Dehydrated       Terazosin      Ciprofloxacin Rash     Simvastatin Palpitations     Muscle weakness, leg cramping              Medications:     Current Outpatient Prescriptions   Medication Sig     NITROSTAT 0.4 MG sublingual tablet DISSOLVE ONE TABLET UNDER THE TONGUE EVERY 5 MINUTES AS NEEDED FOR CHEST PAIN.  DO NOT EXCEED A TOTAL OF 3 DOSES IN 15 MINUTES. CALL 911.     isosorbide mononitrate (ISMO/MONOKET) 20 MG tablet Take 2 tablets (40 mg) by mouth 3 times daily     potassium chloride (K-TAB,KLOR-CON) 10 MEQ tablet Take 1 tablet (10 mEq) by " "mouth 2 times daily     clopidogrel (PLAVIX) 75 MG tablet Take 1 tablet (75 mg) by mouth daily     allopurinol (ZYLOPRIM) 100 MG tablet Take 1 tablet (100 mg) by mouth daily     furosemide (LASIX) 40 MG tablet      order for DME Equipment being ordered: Left hand and wrist brace     itraconazole (SPORANOX) 100 MG capsule Take 2 capsules (200 mg) by mouth daily     metoprolol (LOPRESSOR) 100 MG tablet TAKE ONE TABLET BY MOUTH TWICE DAILY     omeprazole (PRILOSEC) 40 MG capsule TAKE ONE CAPSULE BY MOUTH ONCE DAILY     pravastatin (PRAVACHOL) 80 MG tablet TAKE ONE TABLET BY MOUTH ONCE DAILY     hydrALAZINE (APRESOLINE) 100 MG TABS Take 1 tablet (100 mg) by mouth 3 times daily     timolol (TIMOPTIC) 0.5 % ophthalmic solution      multivitamin  with lutein (OCUVITE WITH LTEIN) CAPS Take 1 capsule by mouth daily     aspirin 81 MG tablet Take 1 tablet by mouth daily.     cyanocolbalamin (VITAMIN B-12) 1000 MCG tablet Take 1 tablet by mouth daily. As directed     [DISCONTINUED] furosemide (LASIX) 20 MG tablet Take 2 tablets (40 mg) by mouth daily     No current facility-administered medications for this visit.             REVIEW OF SYSTEMS:    See HPI for pertinent details.  Remainder of 10-point ROS negative.         PHYSICAL EXAM   /62  Pulse 64  Ht 1.651 m (5' 5\")  Wt 71.3 kg (157 lb 3.2 oz)  BMI 26.16 kg/m2  GENERAL: No acute distress. Well nourished.   HEENT:  Sclerae anicteric.  Conjunctivae pink.  Moist mucous membranes.  NECK:  Supple.  No lymphadenopathy.  CARDIAC:  Regular rate and rhythm.  LUNGS:  Non-labored breathing  BACK:  No costovertebral tenderness.  ABDOMEN: Soft, non-tender, no surgical scars, no organomegaly, non-tender.  :  Normal external genitalia and introitus, atrophic changes noted          Stress incontinence not demonstrated with coughing          No cystocele or rectocele          Cervix is surgically absent          Pelvic floor muscles of normal tonicity, tender to " palpation  Rectum and perineum WNL, no palpable masses, good rectal tone.    SKIN: No rashes.  Dry.     EXTREMITIES:  Warm, well perfused.  No lower extremity edema bilaterally.  NEURO: normal gait, no focal deficits.     PVR: 18 ml          LABS AND IMAGING:   Urine dipstick: Unremarkable          ASSESSMENT:   Tessa Mansfield is a 82 year old female who presents for stress urinary incontinence, myofascial tenderness of the pelvic floor, pelvic floor dysfunction, family history of bladder cancer.  Different management options were discussed with the patient including observation, Kegel exercises, biofeedback, and surgery. Given her pelvic floor tenderness on exam would likely benefit most from PT.  Also given the acute onset of her ANSHU will workup for possible infection as etiology.  Finally, given her sister's history will rule out cancer as etiology            PLAN:     Send urine culture, ureaplasma, mycoplasma cultures    Referral to pelvic floor PT    Send urine cytology. Return for cystoscopic examination next available date.    Dane Vega MD  Urology Resident    Patient was seen and examined with Dr. Lilly    Addendum:    I, Adriana Lilly, saw and examined the patient with the resident team.  I agree with the note above with changes made as necessary.    About 25 minutes were spent with patient today, > 50% in counseling and coordination of care    Adriana Lilly MD MPH   of Urology    CC  Patient Care Team:  Pedro Gomez MD as PCP - General (Family Practice)  Aishwarya Mir MD as MD (Internal Medicine)  Bob Bowman MD as MD (Nephrology)  Adriana Lilly MD as MD (Urology)  PEDRO GOMEZ    Copy to patient  TESSA MANSFIELD  [address] [facility] [address]

## 2017-04-13 NOTE — NURSING NOTE
"Chief Complaint   Patient presents with     Consult     Incontinence       Blood pressure 123/62, pulse 64, height 1.651 m (5' 5\"), weight 71.3 kg (157 lb 3.2 oz), not currently breastfeeding. Body mass index is 26.16 kg/(m^2).    Patient Active Problem List   Diagnosis     Degeneration of cervical intervertebral disc     Nonallopathic lesion of cervical region     Nonallopathic lesion of thoracic region     Coronary artery disease     Dizziness and giddiness     Edema     Esophageal reflux     Gout     Essential hypertension     Obstructive sleep apnea     Osteomalacia     Post-menopausal osteoporosis     Cardiac Pacemaker- Medtronic, dual chamber- NOT dependant     Transient complete heart block (H)     Sinus node dysfunction (H)     Disturbance of skin sensation     NSTEMI (non-ST elevated myocardial infarction) (H)     Arrhythmia     Chest pain       Allergies   Allergen Reactions     Bactrim [Sulfamethoxazole W/Trimethoprim] Other (See Comments)     Patient unable to recall     Biaxin [Clarithromycin]      Chlorthalidone Nausea and Vomiting     Clonidine      Codeine Sulfate Itching     Darvon [Propoxyphene Hcl]      Gabapentin Other (See Comments) and Fatigue     \"felt drunk\"     Indocin      Levaquin [Levofloxacin Hemihydrate]      Lyrica Fatigue     Morphine Sulfate      Percocet [Oxycodone-Acetaminophen]      Spironolactone Other (See Comments)     Dehydrated       Terazosin      Ciprofloxacin Rash     Simvastatin Palpitations     Muscle weakness, leg cramping         Current Outpatient Prescriptions   Medication Sig Dispense Refill     NITROSTAT 0.4 MG sublingual tablet DISSOLVE ONE TABLET UNDER THE TONGUE EVERY 5 MINUTES AS NEEDED FOR CHEST PAIN.  DO NOT EXCEED A TOTAL OF 3 DOSES IN 15 MINUTES. CALL 911. 25 tablet 3     isosorbide mononitrate (ISMO/MONOKET) 20 MG tablet Take 2 tablets (40 mg) by mouth 3 times daily 540 tablet 3     potassium chloride (K-TAB,KLOR-CON) 10 MEQ tablet Take 1 tablet (10 mEq) " by mouth 2 times daily 180 tablet 2     clopidogrel (PLAVIX) 75 MG tablet Take 1 tablet (75 mg) by mouth daily 90 tablet 2     allopurinol (ZYLOPRIM) 100 MG tablet Take 1 tablet (100 mg) by mouth daily 30 tablet 3     furosemide (LASIX) 40 MG tablet        order for DME Equipment being ordered: Left hand and wrist brace 1 each 0     itraconazole (SPORANOX) 100 MG capsule Take 2 capsules (200 mg) by mouth daily 180 capsule 0     metoprolol (LOPRESSOR) 100 MG tablet TAKE ONE TABLET BY MOUTH TWICE DAILY 180 tablet 3     omeprazole (PRILOSEC) 40 MG capsule TAKE ONE CAPSULE BY MOUTH ONCE DAILY 90 capsule 3     pravastatin (PRAVACHOL) 80 MG tablet TAKE ONE TABLET BY MOUTH ONCE DAILY 90 tablet 3     hydrALAZINE (APRESOLINE) 100 MG TABS Take 1 tablet (100 mg) by mouth 3 times daily 270 tablet 3     timolol (TIMOPTIC) 0.5 % ophthalmic solution        multivitamin  with lutein (OCUVITE WITH LTEIN) CAPS Take 1 capsule by mouth daily       aspirin 81 MG tablet Take 1 tablet by mouth daily.       cyanocolbalamin (VITAMIN B-12) 1000 MCG tablet Take 1 tablet by mouth daily. As directed       [DISCONTINUED] furosemide (LASIX) 20 MG tablet Take 2 tablets (40 mg) by mouth daily 90 tablet 3       Social History   Substance Use Topics     Smoking status: Former Smoker     Packs/day: 0.30     Years: 15.00     Types: Cigarettes     Smokeless tobacco: Never Used      Comment: Quit 22+ years ago     Alcohol use Not on file       CHIQUIS Pennington  4/13/2017  3:52 PM

## 2017-04-14 LAB
BACTERIA SPEC CULT: NO GROWTH
COPATH REPORT: NORMAL
MICRO REPORT STATUS: NORMAL
SPECIMEN SOURCE: NORMAL

## 2017-04-20 LAB
BACTERIA SPEC CULT: NORMAL
BACTERIA SPEC CULT: NORMAL
MICRO REPORT STATUS: NORMAL
MICRO REPORT STATUS: NORMAL
SPECIMEN SOURCE: NORMAL
SPECIMEN SOURCE: NORMAL

## 2017-05-01 ENCOUNTER — PRE VISIT (OUTPATIENT)
Dept: UROLOGY | Facility: CLINIC | Age: 82
End: 2017-05-01

## 2017-05-01 NOTE — TELEPHONE ENCOUNTER
Patient coming in for a cystoscopy. Chart reviewed and all records available. No need for a call.

## 2017-05-04 ENCOUNTER — OFFICE VISIT (OUTPATIENT)
Dept: UROLOGY | Facility: CLINIC | Age: 82
End: 2017-05-04

## 2017-05-04 VITALS
HEIGHT: 65 IN | HEART RATE: 75 BPM | BODY MASS INDEX: 25.96 KG/M2 | WEIGHT: 155.8 LBS | DIASTOLIC BLOOD PRESSURE: 74 MMHG | SYSTOLIC BLOOD PRESSURE: 148 MMHG

## 2017-05-04 DIAGNOSIS — M79.18 MYALGIA OF PELVIC FLOOR: ICD-10-CM

## 2017-05-04 DIAGNOSIS — Z80.52 FH: BLADDER CANCER: ICD-10-CM

## 2017-05-04 DIAGNOSIS — N39.3 FEMALE STRESS INCONTINENCE: Primary | ICD-10-CM

## 2017-05-04 DIAGNOSIS — M62.89 HIGH-TONE PELVIC FLOOR DYSFUNCTION: ICD-10-CM

## 2017-05-04 ASSESSMENT — PAIN SCALES - GENERAL
PAINLEVEL: NO PAIN (0)
PAINLEVEL: NO PAIN (0)

## 2017-05-04 NOTE — LETTER
"5/4/2017       RE: Tessa Mansfield  [address]  [place] [address]     Dear Colleague,    Thank you for referring your patient, Tessa Mansfield, to the [facility] UROLOGY AND INST FOR PROSTATE AND UROLOGIC CANCERS at [facility] Please see a copy of my visit note below.    May 4, 2017    Return visit    Patient returns today for follow up for cystoscopy.  She has acute onset stress incontinence and family history of bladder cancer. She denies any changes in her health since last visit.    /74  Pulse 75  Ht 1.651 m (5' 5\")  Wt 70.7 kg (155 lb 12.8 oz)  BMI 25.93 kg/m2  She is comfortable, in no distress, non-labored breathing.  Abdomen is soft, non-tender, non-distended.  Normal external female genitalia.  Negative CST.      Cystoscopy Note: After informed consent was obtained patient was prepped and draped in the standard fashion.  The flexible cystoscope was inserted into a normal appearing urethral meatus.  The urothelium was carefully examined and there were no tumors, masses, stones, foreign bodies, or other urothelial abnormalities noted.  Bilateral ureteral orifices were noted in the normal orthotopic position and both effluxed clear urine.  The cystoscope was retroflexed and the bladder neck was unremarkable.  The urethra was carefully examined upon removing the cystoscope and was unremarkable.  Patient tolerated the procedure without complications noted.      A/P: 82 year old F with stress incontinence, family history of bladder cancer, myofascial tenderness of the pelvic floor, pelvic floor dysfunction    She will continue with pelvic floor therapy    RTC 6 months, sooner if needed  Adriana Lilly MD MPH   of Urology    CC  Patient Care Team:  Pedro Gomez MD as PCP - General (Family Practice)  Aishwarya Mir MD as MD (Internal Medicine)  Bob Bowman MD as MD (Nephrology)        "

## 2017-05-04 NOTE — NURSING NOTE
"No chief complaint on file.      Blood pressure 148/74, pulse 75, height 1.651 m (5' 5\"), weight 70.7 kg (155 lb 12.8 oz), not currently breastfeeding. Body mass index is 25.93 kg/(m^2).    Patient Active Problem List   Diagnosis     Degeneration of cervical intervertebral disc     Nonallopathic lesion of cervical region     Nonallopathic lesion of thoracic region     Coronary artery disease     Dizziness and giddiness     Edema     Esophageal reflux     Gout     Essential hypertension     Obstructive sleep apnea     Osteomalacia     Post-menopausal osteoporosis     Cardiac Pacemaker- Medtronic, dual chamber- NOT dependant     Transient complete heart block (H)     Sinus node dysfunction (H)     Disturbance of skin sensation     NSTEMI (non-ST elevated myocardial infarction) (H)     Arrhythmia     Chest pain       Allergies   Allergen Reactions     Bactrim [Sulfamethoxazole W/Trimethoprim] Other (See Comments)     Patient unable to recall     Biaxin [Clarithromycin]      Chlorthalidone Nausea and Vomiting     Clonidine      Codeine Sulfate Itching     Darvon [Propoxyphene Hcl]      Gabapentin Other (See Comments) and Fatigue     \"felt drunk\"     Indocin      Levaquin [Levofloxacin Hemihydrate]      Lyrica Fatigue     Morphine Sulfate      Percocet [Oxycodone-Acetaminophen]      Spironolactone Other (See Comments)     Dehydrated       Terazosin      Ciprofloxacin Rash     Simvastatin Palpitations     Muscle weakness, leg cramping         Current Outpatient Prescriptions   Medication Sig Dispense Refill     NITROSTAT 0.4 MG sublingual tablet DISSOLVE ONE TABLET UNDER THE TONGUE EVERY 5 MINUTES AS NEEDED FOR CHEST PAIN.  DO NOT EXCEED A TOTAL OF 3 DOSES IN 15 MINUTES. CALL 911. 25 tablet 3     isosorbide mononitrate (ISMO/MONOKET) 20 MG tablet Take 2 tablets (40 mg) by mouth 3 times daily 540 tablet 3     potassium chloride (K-TAB,KLOR-CON) 10 MEQ tablet Take 1 tablet (10 mEq) by mouth 2 times daily 180 tablet 2     " clopidogrel (PLAVIX) 75 MG tablet Take 1 tablet (75 mg) by mouth daily 90 tablet 2     allopurinol (ZYLOPRIM) 100 MG tablet Take 1 tablet (100 mg) by mouth daily 30 tablet 3     furosemide (LASIX) 40 MG tablet        order for DME Equipment being ordered: Left hand and wrist brace 1 each 0     itraconazole (SPORANOX) 100 MG capsule Take 2 capsules (200 mg) by mouth daily 180 capsule 0     metoprolol (LOPRESSOR) 100 MG tablet TAKE ONE TABLET BY MOUTH TWICE DAILY 180 tablet 3     omeprazole (PRILOSEC) 40 MG capsule TAKE ONE CAPSULE BY MOUTH ONCE DAILY 90 capsule 3     pravastatin (PRAVACHOL) 80 MG tablet TAKE ONE TABLET BY MOUTH ONCE DAILY 90 tablet 3     hydrALAZINE (APRESOLINE) 100 MG TABS Take 1 tablet (100 mg) by mouth 3 times daily 270 tablet 3     timolol (TIMOPTIC) 0.5 % ophthalmic solution        multivitamin  with lutein (OCUVITE WITH LTEIN) CAPS Take 1 capsule by mouth daily       aspirin 81 MG tablet Take 1 tablet by mouth daily.       cyanocolbalamin (VITAMIN B-12) 1000 MCG tablet Take 1 tablet by mouth daily. As directed         Social History   Substance Use Topics     Smoking status: Former Smoker     Packs/day: 0.30     Years: 15.00     Types: Cigarettes     Smokeless tobacco: Never Used      Comment: Quit 22+ years ago     Alcohol use Not on file       CHIQUIS Pennington  5/4/2017  9:07 AM

## 2017-05-04 NOTE — MR AVS SNAPSHOT
After Visit Summary   5/4/2017    Tessa Mansfield    MRN: [id number]           Patient Information     Date Of Birth          1935        Visit Information        Provider Department      5/4/2017 8:45 AM Adriana Lilly MD [facility] Urology and [facility] for Prostate and Urologic Cancers         Follow-ups after your visit        Your next 10 appointments already scheduled     Nov 08, 2017 10:00 AM CST   Lab with  LAB   [facility] Lab ([facility])    [address] [place] [address]  [address] [facility] [id number] [phone]            Nov 08, 2017 11:10 AM CST   (Arrive by 10:40 AM)   Return Visit with Marzena Martin MD   [facility] Nephrology ([facility])    [address] [place] [address]  3[address] [facility] [id number] [phone]            Nov 09, 2017  9:45 AM CST   (Arrive by 9:30 AM)   Return Visit with Adriana Lilly MD   [facility] Urology and [facility] for Prostate and Urologic Cancers ([facility])    [address] [place] [address]  4[address] [facility] [id number] [phone]              Who to contact     Please call your clinic at [phone] to:    Ask questions about your health    Make or cancel appointments    Discuss your medicines    Learn about your test results    Speak to your doctor   If you have compliments or concerns about an experience at your clinic, or if you wish to file a complaint, please contact [facility] Patient Relations at [phone] or email us at [email or web address]         Additional Information About Your Visit        MyChart Information     [employer] is an electronic gateway that provides easy, online access to your medical records. With BAUNAT, you can request a clinic appointment, read your test results, renew a prescription or communicate with your care team.     To sign up for LO[employer] visit the website at [email or web address]  "  You will be asked to enter the access code listed below, as well as some personal information. Please follow the directions to create your username and password.     Your access code is: [email or web address]  Expires: 2017  4:42 PM     Your access code will  in 90 days. If you need help or a new code, please contact your [facility] or call [phone] for assistance.        Care EveryWhere ID     This is your Care EveryWhere ID. This could be used by other organizations to access your [place] medical records  [id number]        Your Vitals Were     Pulse Height BMI (Body Mass Index)             75 1.651 m (5' 5\") 25.93 kg/m2          Blood Pressure from Last 3 Encounters:   17 148/74   17 123/62   17 136/68    Weight from Last 3 Encounters:   17 70.7 kg (155 lb 12.8 oz)   17 71.3 kg (157 lb 3.2 oz)   17 72 kg (158 lb 12.8 oz)              Today, you had the following     No orders found for display       Primary Care Provider Office Phone # Fax #    Pedro Gomez [phone]       [facility] [address] [place] [id number]        Thank you!     Thank you for choosing [facility] UROLOGY AND [facility] FOR PROSTATE AND UROLOGIC CANCERS  for your care. Our goal is always to provide you with excellent care. Hearing back from our patients is one way we can continue to improve our services. Please take a few minutes to complete the written survey that you may receive in the mail after your visit with us. Thank you!             Your Updated Medication List - Protect others around you: Learn how to safely use, store and throw away your medicines at www.disposemymeds.org.          This list is accurate as of: 17  9:44 AM.  Always use your most recent med list.                   Brand Name Dispense Instructions for use    allopurinol 100 MG tablet    ZYLOPRIM    30 tablet    Take 1 tablet (100 mg) by mouth daily "       aspirin 81 MG tablet      Take 1 tablet by mouth daily.       clopidogrel 75 MG tablet    PLAVIX    90 tablet    Take 1 tablet (75 mg) by mouth daily       cyanocobalamin 1000 MCG tablet    vitamin  B-12     Take 1 tablet by mouth daily. As directed       furosemide 40 MG tablet    LASIX         hydrALAZINE 100 MG Tabs tablet    APRESOLINE    270 tablet    Take 1 tablet (100 mg) by mouth 3 times daily       isosorbide mononitrate 20 MG tablet    ISMO/MONOKET    540 tablet    Take 2 tablets (40 mg) by mouth 3 times daily       itraconazole 100 MG capsule    SPORANOX    180 capsule    Take 2 capsules (200 mg) by mouth daily       metoprolol 100 MG tablet    LOPRESSOR    180 tablet    TAKE ONE TABLET BY MOUTH TWICE DAILY       multivitamin  with lutein Caps per capsule      Take 1 capsule by mouth daily       NITROSTAT 0.4 MG sublingual tablet   Generic drug:  nitroglycerin     25 tablet    DISSOLVE ONE TABLET UNDER THE TONGUE EVERY 5 MINUTES AS NEEDED FOR CHEST PAIN.  DO NOT EXCEED A TOTAL OF 3 DOSES IN 15 MINUTES. CALL 911.       omeprazole 40 MG capsule    priLOSEC    90 capsule    TAKE ONE CAPSULE BY MOUTH ONCE DAILY       order for DME     1 each    Equipment being ordered: Left hand and wrist brace       potassium chloride 10 MEQ tablet    K-TAB,KLOR-CON    180 tablet    Take 1 tablet (10 mEq) by mouth 2 times daily       pravastatin 80 MG tablet    PRAVACHOL    90 tablet    TAKE ONE TABLET BY MOUTH ONCE DAILY       timolol 0.5 % ophthalmic solution    TIMOPTIC

## 2017-05-04 NOTE — PROGRESS NOTES
"May 4, 2017    Return visit    Patient returns today for follow up for cystoscopy.  She has acute onset stress incontinence and family history of bladder cancer. She denies any changes in her health since last visit.    /74  Pulse 75  Ht 1.651 m (5' 5\")  Wt 70.7 kg (155 lb 12.8 oz)  BMI 25.93 kg/m2  She is comfortable, in no distress, non-labored breathing.  Abdomen is soft, non-tender, non-distended.  Normal external female genitalia.  Negative CST.      Cystoscopy Note: After informed consent was obtained patient was prepped and draped in the standard fashion.  The flexible cystoscope was inserted into a normal appearing urethral meatus.  The urothelium was carefully examined and there were no tumors, masses, stones, foreign bodies, or other urothelial abnormalities noted.  Bilateral ureteral orifices were noted in the normal orthotopic position and both effluxed clear urine.  The cystoscope was retroflexed and the bladder neck was unremarkable.  The urethra was carefully examined upon removing the cystoscope and was unremarkable.  Patient tolerated the procedure without complications noted.      A/P: 82 year old F with stress incontinence, family history of bladder cancer, myofascial tenderness of the pelvic floor, pelvic floor dysfunction    She will continue with pelvic floor therapy    RTC 6 months, sooner if needed    Adriana Lilly MD MPH   of Urology    CC  Patient Care Team:  Pedro Gomez MD as PCP - General (Family Practice)  Aishwarya Mir MD as MD (Internal Medicine)  Bob Bowman MD as MD (Nephrology)  Adriana Lilly MD as MD (Urology)  PEDRO GOMEZ              "

## 2017-05-22 DIAGNOSIS — M10.9 GOUT: ICD-10-CM

## 2017-05-24 DIAGNOSIS — I10 ESSENTIAL HYPERTENSION: Primary | ICD-10-CM

## 2017-05-24 RX ORDER — FUROSEMIDE 40 MG
40 TABLET ORAL DAILY
Qty: 90 TABLET | Refills: 3 | Status: SHIPPED | OUTPATIENT
Start: 2017-05-24 | End: 2018-07-05

## 2017-05-24 NOTE — TELEPHONE ENCOUNTER
Last Office Visit with Nephrologist:  11/7/16.  Medication refilled per Nephrology Clinic protocol.     Christy Roblero RN

## 2017-05-25 RX ORDER — ALLOPURINOL 100 MG/1
100 TABLET ORAL DAILY
Qty: 90 TABLET | Refills: 1 | Status: SHIPPED | OUTPATIENT
Start: 2017-05-25 | End: 2017-11-24

## 2017-05-25 NOTE — TELEPHONE ENCOUNTER
allopurinol (ZYLOPRIM) 100 MG      Last Written Prescription Date:  1/26/17  Last Fill Quantity: 30,   # refills: 3  Last Office Visit : 1/18/17  Future Office visit: NONE  Creatinine   Date Value Ref Range Status   11/07/2016 1.99 (H) 0.52 - 1.04 mg/dL Final   CBC RESULTS:   Recent Labs   Lab Test  11/07/16   1310   03/24/16   1722   WBC   --    --   10.0   RBC   --    --   4.17   HGB  10.5*   < >  11.8   HCT   --    --   38.0   MCV   --    --   91   MCH   --    --   28.3   MCHC   --    --   31.1*   RDW   --    --   13.2   PLT   --    --   226    < > = values in this interval not displayed.

## 2017-05-26 ENCOUNTER — HEALTH MAINTENANCE LETTER (OUTPATIENT)
Age: 82
End: 2017-05-26

## 2017-06-22 ENCOUNTER — ALLIED HEALTH/NURSE VISIT (OUTPATIENT)
Dept: CARDIOLOGY | Facility: CLINIC | Age: 82
End: 2017-06-22
Attending: INTERNAL MEDICINE
Payer: COMMERCIAL

## 2017-06-22 DIAGNOSIS — I44.2 TRANSIENT COMPLETE HEART BLOCK (H): Primary | ICD-10-CM

## 2017-06-22 DIAGNOSIS — I49.5 SINUS NODE DYSFUNCTION (H): ICD-10-CM

## 2017-06-22 PROCEDURE — 93280 PM DEVICE PROGR EVAL DUAL: CPT | Mod: 26 | Performed by: INTERNAL MEDICINE

## 2017-06-22 PROCEDURE — 93280 PM DEVICE PROGR EVAL DUAL: CPT | Mod: ZF

## 2017-06-22 NOTE — MR AVS SNAPSHOT
After Visit Summary   6/22/2017    Tessa Mansfield    MRN: [id number]           Patient Information     Date Of Birth          1935        Visit Information        Provider Department      6/22/2017 6:00 AM  ICD REMOTE [facility]        Today's Diagnoses     Transient complete heart block (H)    -  1    Sinus node dysfunction (H)           Follow-ups after your visit        Your next 10 appointments already scheduled     Nov 08, 2017 10:00 AM CST   Lab with  LAB   [facility] Lab ([facility])    [address] [place] [address]  [address] [facility] [id number] [phone]            Nov 08, 2017 11:10 AM CST   (Arrive by 10:40 AM)   Return Visit with Marzena Martin MD   [facility] Nephrology ([facility])    [address] [place] [address]  [address] [facility] [id number] [phone]            Nov 09, 2017  9:45 AM CST   (Arrive by 9:30 AM)   Return Visit with Adriana Lilly MD   [facility] Urology and [facility] for Prostate and Urologic Cancers ([facility])    [address] [place] [address]  [address] [facility] [id number] [phone]              Who to contact     If you have questions or need follow up information about today's clinic visit or your schedule please contact [facility] directly at [phone].  Normal or non-critical lab and imaging results will be communicated to you by MyChart, letter or phone within 4 business days after the clinic has received the results. If you do not hear from us within 7 days, please contact the clinic through MyChart or phone. If you have a critical or abnormal lab result, we will notify you by phone as soon as possible.  Submit refill requests through [employer] or call your pharmacy and they will forward the refill request to us. Please allow 3 business days for your refill to be completed.          Additional Information About Your Visit        MyChart Information  "    [employer] lets you send messages to your doctor, view your test results, renew your prescriptions, schedule appointments and more. To sign up, go to [email or web address] . Click on \"Log in\" on the left side of the screen, which will take you to the Welcome page. Then click on \"Sign up Now\" on the right side of the page.     You will be asked to enter the access code listed below, as well as some personal information. Please follow the directions to create your username and password.     Your access code is: [email or web address]  Expires: 2017  4:42 PM     Your access code will  in 90 days. If you need help or a new code, please call your [place] clinic or [phone].        Care EveryWhere ID     This is your Care EveryWhere ID. This could be used by other organizations to access your [place] medical records  [id number]         Blood Pressure from Last 3 Encounters:   17 148/74   17 123/62   17 136/68    Weight from Last 3 Encounters:   17 70.7 kg (155 lb 12.8 oz)   17 71.3 kg (157 lb 3.2 oz)   17 72 kg (158 lb 12.8 oz)              We Performed the Following     PM DEVICE PROGRAMMING EVAL, DUAL LEAD PACER        Primary Care Provider Office Phone # Fax #    Pedro Gomez [phone]       [facility] [address] [place] [address]        Equal Access to Services     FAYE GUILLORY : Hadii marcela rosario hadasho Sofelecia, waaxda luqadaha, qaybta kaalmada salvador, [place] fisher. So [facility] [phone].    ATENCIÓN: Si habla español, tiene a barga disposición servicios gratuitos de asistencia lingüística. Llame al [phone].    We comply with applicable federal civil rights laws and [place] laws. We do not discriminate on the basis of race, color, national origin, age, disability sex, sexual orientation or gender identity.            Thank you!     Thank you for choosing [facility]  for your care. " Our goal is always to provide you with excellent care. Hearing back from our patients is one way we can continue to improve our services. Please take a few minutes to complete the written survey that you may receive in the mail after your visit with us. Thank you!             Your Updated Medication List - Protect others around you: Learn how to safely use, store and throw away your medicines at www.disposemymeds.org.          This list is accurate as of: 6/22/17 11:59 PM.  Always use your most recent med list.                   Brand Name Dispense Instructions for use Diagnosis    allopurinol 100 MG tablet    ZYLOPRIM    90 tablet    Take 1 tablet (100 mg) by mouth daily    Gout       aspirin 81 MG tablet      Take 1 tablet by mouth daily.        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 As directed        furosemide 40 MG tablet    LASIX    90 tablet    Take 1 tablet (40 mg) by mouth daily    Essential hypertension       hydrALAZINE 100 MG Tabs tablet    APRESOLINE    270 tablet    Take 1 tablet (100 mg) by mouth 3 times daily    Essential hypertension       isosorbide mononitrate 20 MG tablet    ISMO/MONOKET    540 tablet    Take 2 tablets (40 mg) by mouth 3 times daily    Coronary artery disease with other form of angina pectoris (H)       itraconazole 100 MG capsule    SPORANOX    180 capsule    Take 2 capsules (200 mg) by mouth daily    Dermatophytosis of nail       metoprolol 100 MG tablet    LOPRESSOR    180 tablet    TAKE ONE TABLET BY MOUTH TWICE DAILY    Essential hypertension       multivitamin  with lutein Caps per capsule      Take 1 capsule by mouth daily        NITROSTAT 0.4 MG sublingual tablet   Generic drug:  nitroglycerin      25 tablet    DISSOLVE ONE TABLET UNDER THE TONGUE EVERY 5 MINUTES AS NEEDED FOR CHEST PAIN.  DO NOT EXCEED A TOTAL OF 3 DOSES IN 15 MINUTES. CALL 911.    CAD (coronary artery disease)       omeprazole 40 MG capsule    priLOSEC    90 capsule    TAKE ONE CAPSULE BY MOUTH ONCE DAILY    Esophageal reflux       order for DME     1 each    Equipment being ordered: Left hand and wrist brace    Pain of left hand, Fall from standing, initial encounter       potassium chloride 10 MEQ tablet    K-TAB,KLOR-CON    180 tablet    Take 1 tablet (10 mEq) by mouth 2 times daily    Hypokalemia       pravastatin 80 MG tablet    PRAVACHOL    90 tablet    TAKE ONE TABLET BY MOUTH ONCE DAILY    Hyperlipidemia LDL goal <130       timolol 0.5 % ophthalmic solution    TIMOPTIC

## 2017-06-26 NOTE — PROGRESS NOTES
Pt sent in her routine remote pacemaker check for evaluation per MD order.  Her pacemaker check does not show any atrial or ventricular arrhythmias, she is RV pacing <0.1% of the time and her heart rate histograms show fair heart rate variation.  Her pacemaker battery estimates 2.5 years left and her pacemaker is functioning normally.  Pt was called with the results and a message was let for her to call us back with any questions or concerns.   We will plan for her her to send her next remote check on 9/22/17.    remote pacemaker

## 2017-08-11 ENCOUNTER — HEALTH MAINTENANCE LETTER (OUTPATIENT)
Age: 82
End: 2017-08-11

## 2017-09-21 ENCOUNTER — ALLIED HEALTH/NURSE VISIT (OUTPATIENT)
Dept: CARDIOLOGY | Facility: CLINIC | Age: 82
End: 2017-09-21
Attending: INTERNAL MEDICINE
Payer: COMMERCIAL

## 2017-09-21 DIAGNOSIS — I49.5 SINUS NODE DYSFUNCTION (H): Primary | ICD-10-CM

## 2017-09-21 PROCEDURE — 93294 REM INTERROG EVL PM/LDLS PM: CPT | Performed by: INTERNAL MEDICINE

## 2017-09-21 NOTE — MR AVS SNAPSHOT
After Visit Summary   9/21/2017    Tessa Mansfield    MRN: [id number]           Patient Information     Date Of Birth          1935        Visit Information        Provider Department      9/21/2017 6:00 AM UC ICD REMOTE [facility]        Today's Diagnoses     Sinus node dysfunction (H)    -  1       Follow-ups after your visit        Your next 10 appointments already scheduled     Nov 08, 2017 10:00 AM CST   Lab with UC LAB   [facility] ([facility])    [address] [place] [address]  1st [facility] [id number] [phone]            Nov 08, 2017 11:10 AM CST   (Arrive by 10:40 AM)   Return Visit with Marzena Martin MD   [facility] Nephrology ([facility])    [address] [place] [address]  3rd [facility] [id number] [phone]            Nov 09, 2017  9:45 AM CST   (Arrive by 9:30 AM)   Return Visit with Adriana Lilly MD   [facility]rology and [facility] ([facility])    [address] [place] [address]  4th [facility] [id number] [phone]              Who to contact     If you have questions or need follow up information about today's clinic visit or your schedule please contact [facility] directly at [phone].  Normal or non-critical lab and imaging results will be communicated to you by FoxyTaskshart, letter or phone within 4 business days after the clinic has received the results. If you do not hear from us within 7 days, please contact the clinic through MyChart or phone. If you have a critical or abnormal lab result, we will notify you by phone as soon as possible.  Submit refill requests through RegalBox or call your pharmacy and they will forward the refill request to us. Please allow 3 business days for your refill to be completed.          Additional Information About Your Visit        FoxyTaskshart Information     RegalBox lets you send messages to  "your doctor, view your test results, renew your prescriptions, schedule appointments and more. To sign up, go to [email or web address] . Click on \"Log in\" on the left side of the screen, which will take you to the Welcome page. Then click on \"Sign up Now\" on the right side of the page.     You will be asked to enter the access code listed below, as well as some personal information. Please follow the directions to create your username and password.     Your access code is: [email or web address]  Expires: 2017  8:50 AM     Your access code will  in 90 days. If you need help or a new code, please call your [place] clinic or [phone].        Care EveryWhere ID     This is your Care EveryWhere ID. This could be used by other organizations to access your [place] medical records  [id number]         Blood Pressure from Last 3 Encounters:   17 148/74   17 123/62   17 136/68    Weight from Last 3 Encounters:   17 70.7 kg (155 lb 12.8 oz)   17 71.3 kg (157 lb 3.2 oz)   17 72 kg (158 lb 12.8 oz)              Today, you had the following     No orders found for display       Primary Care Provider Office Phone # Fax #    Pedro Gomez [phone]       [address] [address]        Equal Access to Services     [facility] AH: Hadii aad ku hadasho Soomaali, waaxda luqadaha, qaybta kaalmada adeegyada, [place] . So [facility] [phone].    ATENCIÓN: Si habla español, tiene a braga disposición servicios gratuitos de asistencia lingüística. Llame al [phone].    We comply with applicable federal civil rights laws and [place] laws. We do not discriminate on the basis of race, color, national origin, age, disability sex, sexual orientation or gender identity.            Thank you!     Thank you for choosing [facility]  for your care. Our goal is always to provide you with excellent care. Hearing back " from our patients is one way we can continue to improve our services. Please take a few minutes to complete the written survey that you may receive in the mail after your visit with us. Thank you!             Your Updated Medication List - Protect others around you: Learn how to safely use, store and throw away your medicines at www.disposemymeds.org.          This list is accurate as of: 9/21/17 11:59 PM.  Always use your most recent med list.                   Brand Name Dispense Instructions for use Diagnosis    allopurinol 100 MG tablet    ZYLOPRIM    90 tablet    Take 1 tablet (100 mg) by mouth daily    Gout       aspirin 81 MG tablet      Take 1 tablet by mouth daily.        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  DO NOT EXCEED A TOTAL OF 3 DOSES IN 15 MINUTES. CALL 911.    CAD (coronary artery disease)       omeprazole 40 MG capsule    priLOSEC    90 capsule    TAKE ONE CAPSULE BY MOUTH ONCE DAILY    Esophageal reflux       order for DME     1 each    Equipment being ordered: Left hand and wrist brace    Pain of left hand, Fall from standing, initial encounter       potassium chloride 10 MEQ tablet    K-TAB,KLOR-CON    180 tablet    Take 1 tablet (10 mEq) by mouth 2 times daily    Hypokalemia       pravastatin 80 MG tablet    PRAVACHOL    90 tablet    TAKE ONE TABLET BY MOUTH ONCE DAILY    Hyperlipidemia LDL goal <130       timolol 0.5 % ophthalmic solution    TIMOPTIC

## 2017-09-22 ENCOUNTER — TELEPHONE (OUTPATIENT)
Dept: CARDIOLOGY | Facility: CLINIC | Age: 82
End: 2017-09-22

## 2017-09-22 ENCOUNTER — CARE COORDINATION (OUTPATIENT)
Dept: CARDIOLOGY | Facility: CLINIC | Age: 82
End: 2017-09-22

## 2017-09-22 NOTE — PROGRESS NOTES
Scheduled Medtronic Carelink remote pacemaker transmission received and reviewed. Device transmission sent per MD orders. Her presenting rhythm is AP/ VS @ 75 bpm. 9 AT/AF episodes recorded, each lasting <1 minute. Normal device function. AP= 83% and = 0.1%. Lead trends appear stable. Battery estimates 3 years to LOLA. Pt notified of transmission results, via VM. Plan for pt to RTC in 3 months as scheduled.  Remote pacemaker transmission

## 2017-09-22 NOTE — TELEPHONE ENCOUNTER
Patient calling reporting she is scheduled for eye surgery 10/18/17.  Her surgeon wants her to be off of her Plavix for 10 days prior to her surgery and 2 days after.  She wants to make sure this is ok.  Please call to discuss.  [phone].

## 2017-09-22 NOTE — PROGRESS NOTES
Patient has an upcoming eye surgery and surgeon is requesting that patient be off of Plavix for 10 days prior and 2 days post surgery.  Dr. Mir notified and states that he is recommending that patient not be off of Plavix.  Patient has a history of restenosis of stent when she has been off Plavix in the past.  Patient notified of this recommendation and will notify her surgeon.

## 2017-09-23 DIAGNOSIS — E78.5 HYPERLIPIDEMIA LDL GOAL <130: ICD-10-CM

## 2017-09-26 RX ORDER — PRAVASTATIN SODIUM 80 MG/1
TABLET ORAL
Qty: 90 TABLET | Refills: 3 | Status: SHIPPED | OUTPATIENT
Start: 2017-09-26 | End: 2018-10-24

## 2017-10-11 ENCOUNTER — OFFICE VISIT (OUTPATIENT)
Dept: ORTHOPEDICS | Facility: CLINIC | Age: 82
End: 2017-10-11

## 2017-10-11 ENCOUNTER — OFFICE VISIT (OUTPATIENT)
Dept: FAMILY MEDICINE | Facility: CLINIC | Age: 82
End: 2017-10-11

## 2017-10-11 VITALS
WEIGHT: 151.5 LBS | SYSTOLIC BLOOD PRESSURE: 138 MMHG | BODY MASS INDEX: 25.21 KG/M2 | DIASTOLIC BLOOD PRESSURE: 76 MMHG | OXYGEN SATURATION: 97 % | HEART RATE: 76 BPM | RESPIRATION RATE: 16 BRPM | TEMPERATURE: 97.8 F

## 2017-10-11 VITALS
DIASTOLIC BLOOD PRESSURE: 76 MMHG | HEIGHT: 65 IN | BODY MASS INDEX: 25.16 KG/M2 | WEIGHT: 151 LBS | SYSTOLIC BLOOD PRESSURE: 138 MMHG

## 2017-10-11 DIAGNOSIS — M79.672 LEFT FOOT PAIN: Primary | ICD-10-CM

## 2017-10-11 DIAGNOSIS — N18.9 CHRONIC KIDNEY DISEASE, UNSPECIFIED CKD STAGE: Primary | ICD-10-CM

## 2017-10-11 DIAGNOSIS — S93.402A MILD ANKLE SPRAIN, LEFT, INITIAL ENCOUNTER: ICD-10-CM

## 2017-10-11 DIAGNOSIS — I25.9 CHRONIC ISCHEMIC HEART DISEASE: ICD-10-CM

## 2017-10-11 DIAGNOSIS — Z91.81 AT RISK FOR FALLING: ICD-10-CM

## 2017-10-11 DIAGNOSIS — Z23 NEED FOR PROPHYLACTIC VACCINATION AND INOCULATION AGAINST INFLUENZA: ICD-10-CM

## 2017-10-11 ASSESSMENT — PAIN SCALES - GENERAL: PAINLEVEL: MODERATE PAIN (5)

## 2017-10-11 NOTE — MR AVS SNAPSHOT
After Visit Summary   10/11/2017    Tessa Mansfield    MRN: [id number]           Patient Information     Date Of Birth          1935        Visit Information        Provider Department      10/11/2017 1:35 PM Pedro Gomez MD [facility]        Today's Diagnoses     Chronic kidney disease, unspecified CKD stage    -  1    At risk for falling        Need for prophylactic vaccination and inoculation against influenza        Chronic ischemic heart disease          Care Instructions    Primary Care Center: [phone]     Primary Care Center Medication Refill Request Information:  * Please contact your pharmacy regarding ANY request for medication refills.  ** [facility] Prescription Fax = [phone]  * Please allow 3 business days for routine medication refills.  * Please allow 5 business days for controlled substance medication refills.     Primary Care Center Test Result notification information:  *You will be notified with in 7-10 days of your appointment day regarding the results of your test.  If you are on MyChart you will be notified as soon as the provider has reviewed the results and signed off on them.          Follow-ups after your visit        Your next 10 appointments already scheduled     Oct 19, 2017 12:35 PM CDT   (Arrive by 12:20 PM)   Return Visit with ePdro Gomez MD   [facility] ([facility])    [address] [place] [address]  4th [facility] [id number] [phone]            Nov 08, 2017 10:00 AM CST   Lab with  LAB   [facility] Lab ([facility])    [address] [place] [address]  1st [facility] [address]   [phone]            Nov 08, 2017 11:10 AM CST   (Arrive by 10:40 AM)   Return Visit with Marzena Martin MD   [facility] Nephrology ([facility])    [address] [place] [address]  3rd [facility] [address]   [phone]            Nov 16, 2017   3:00 PM CST   (Arrive by 2:45 PM)   Pacemaker Check with Uc Cv Device 1   [facility] ([facility])    [address] [facility]  3rd [facility] [address] [phone]            2017  3:30 PM CST   (Arrive by 3:15 PM)   Return Visit with Aishwarya Mir MD   [facility] ([facility])    [address] [address] [phone]              Future tests that were ordered for you today     Open Future Orders        Priority Expected Expires Ordered    Lipid panel reflex to direct LDL - -(Today) Routine 10/11/2017 10/11/2018 10/11/2017    Comprehensive metabolic panel Routine 10/11/2017 10/25/2017 10/11/2017    CBC with platelets Routine 10/11/2017 10/25/2017 10/11/2017            Who to contact     Please call your clinic at [phone] to:    Ask questions about your health    Make or cancel appointments    Discuss your medicines    Learn about your test results    Speak to your doctor   If you have compliments or concerns about an experience at your clinic, or if you wish to file a complaint, please contact [facility] Patient Relations at [phone] or email us at [email or web address]         Additional Information About Your Visit        FIT BiotechharZyncd Information     Funinhandt is an electronic gateway that provides easy, online access to your medical records. With WiSpry, you can request a clinic appointment, read your test results, renew a prescription or communicate with your care team.     To sign up for Funinhandt visit the website at [email or web address]   You will be asked to enter the access code listed below, as well as some personal information. Please follow the directions to create your username and password.     Your access code is: [email or web address]  Expires: 2017  8:50 AM     Your access code will  in 90 days. If you need help or a new  code, please contact your [facility] or call [phone] for assistance.        Care EveryWhere ID     This is your Care EveryWhere ID. This could be used by other organizations to access your [place] medical records  [id number]        Your Vitals Were     Pulse Temperature Respirations Pulse Oximetry Breastfeeding? BMI (Body Mass Index)    76 97.8  F (36.6  C) (Oral) 16 97% No 25.21 kg/m2       Blood Pressure from Last 3 Encounters:   10/11/17 138/76   05/04/17 148/74   04/13/17 123/62    Weight from Last 3 Encounters:   10/11/17 68.7 kg (151 lb 8 oz)   05/04/17 70.7 kg (155 lb 12.8 oz)   04/13/17 71.3 kg (157 lb 3.2 oz)              We Performed the Following     FLU VACCINE, INCREASED ANTIGEN, PRESV FREE, AGE 65+ [[address]]        Primary Care Provider Office Phone # Fax #    Pedro Gomez [phone]        [address] [address]        Equal Access to Services     [facility] HospitalESTRELLA : Hadii aad ku hadasho Sofelecia, waaxda luqadaha, qaybta kaalmada salvador, [place]. So [facility] [phone].    ATENCIÓN: Si habla español, tiene a braga disposición servicios gratuitos de asistencia lingüística. [facility] [phone].    We comply with applicable federal civil rights laws and [place] laws. We do not discriminate on the basis of race, color, national origin, age, disability, sex, sexual orientation, or gender identity.            Thank you!     Thank you for choosing [facility]  for your care. Our goal is always to provide you with excellent care. Hearing back from our patients is one way we can continue to improve our services. Please take a few minutes to complete the written survey that you may receive in the mail after your visit with us. Thank you!             Your Updated Medication List - Protect others around you: Learn how to safely use, store and throw away your medicines at  www.disposemymeds.org.          This list is accurate as of: 10/11/17  2:11 PM.  Always use your most recent med list.                   Brand Name Dispense Instructions for use Diagnosis    allopurinol 100 MG tablet    ZYLOPRIM    90 tablet    Take 1 tablet (100 mg) by mouth daily    Gout       aspirin 81 MG tablet      Take 1 tablet by mouth daily.        clopidogrel 75 MG tablet    PLAVIX    90 tablet    Take 1 tablet (75 mg) by mouth daily    Coronary artery disease with other form of angina pectoris, NSTEMI (non-ST elevated myocardial infarction) (H), S/P angioplasty with stent       cyanocobalamin 1000 MCG tablet    vitamin  B-12     Take 1 tablet by mouth daily. As directed        furosemide 40 MG tablet    LASIX    90 tablet    Take 1 tablet (40 mg) by mouth daily    Essential hypertension       hydrALAZINE 100 MG Tabs tablet    APRESOLINE    270 tablet    Take 1 tablet (100 mg) by mouth 3 times daily    Essential hypertension       isosorbide mononitrate 20 MG tablet    ISMO/MONOKET    540 tablet    Take 2 tablets (40 mg) by mouth 3 times daily    Coronary artery disease with other form of angina pectoris       itraconazole 100 MG capsule    SPORANOX    180 capsule    Take 2 capsules (200 mg) by mouth daily    Dermatophytosis of nail       metoprolol 100 MG tablet    LOPRESSOR    180 tablet    TAKE ONE TABLET BY MOUTH TWICE DAILY    Essential hypertension       multivitamin  with lutein Caps per capsule      Take 1 capsule by mouth daily        NITROSTAT 0.4 MG sublingual tablet   Generic drug:  nitroGLYcerin     25 tablet    DISSOLVE ONE TABLET UNDER THE TONGUE EVERY 5 MINUTES AS NEEDED FOR CHEST PAIN.  DO NOT EXCEED A TOTAL OF 3 DOSES IN 15 MINUTES. CALL 911.    CAD (coronary artery disease)       omeprazole 40 MG capsule    priLOSEC    90 capsule    TAKE ONE CAPSULE BY MOUTH ONCE DAILY    Esophageal reflux       order for DME     1 each    Equipment being ordered: Left hand and wrist brace    Pain of  left hand, Fall from standing, initial encounter       potassium chloride 10 MEQ tablet    K-TAB,KLOR-CON    180 tablet    Take 1 tablet (10 mEq) by mouth 2 times daily    Hypokalemia       pravastatin 80 MG tablet    PRAVACHOL    90 tablet    TAKE ONE TABLET BY MOUTH ONCE DAILY    Hyperlipidemia LDL goal <130       timolol 0.5 % ophthalmic solution    TIMOPTIC

## 2017-10-11 NOTE — MR AVS SNAPSHOT
After Visit Summary   10/11/2017    Tessa Mansfield    MRN: [id number]           Patient Information     Date Of Birth          1935        Visit Information        Provider Department      10/11/2017 2:40 PM Anthony Nolan DO [facility]        Today's Diagnoses     Left foot pain    -  1      Care Instructions    ANKLE SPRAIN:  Symptoms include pain, bruising, and swelling around ankle, often on outer side. The pain may be sharp with activity and dull at rest. You may be walking with a limp at first. The swelling is often present after the pain resolves. If your pain is severe, not improving over 5-7 days, or shoots up your leg, let your physician know as you may need crutches and an immobilizer.  Treatment:  -Ice 10-15 minutes several times a day for the first few days and then after activity.  -Walk as tolerated. Restrict other activities until pain allows. Biking, pool running or swimming are good alternative activities.  -You may benefit from an ankle brace for support while strengthening with therapy  -Some require immobilzation and crutches initially  Strengthening therapy  -Ice 10-15 minutes after activity. (or Ice bath 5-7min)  -often hip and ankle weakness leads to lower extremity (foot, ankle, shin, and knee) problems so a lot of focus will be on core strength and balance  - recommend yoga for core strengthening and stretching  -Perform exercises as instructed through handout or formal therapy if doing. Until then start with the following:  -ankle strengthening-   1) balance on one foot 1-2 min daily (perform on both feet)   2) arch raises- tighten bottom of foot like trying to shorten foot and hold x 3-5 sec, repeat 5 times   3) ankle exercises (4 way with theraband)- 3 sets of 10-15 (fatigue) daily   5) heel raises on step-- once painfree lowering on both, start to slowly lower on one foot  Return to activity guidelines:  -If it hurts, do not do it!  -wear ankle brace  until pain free with full activity and exercises for at least 6 weeks  -Must meet each goal before return to play:  1) painfree jogging straigt line  2) pain free sprints  3) pain free cutting/ changing directions            Follow-ups after your visit        Follow-up notes from your care team     Return in about 2 weeks (around 10/25/2017).      Your next 10 appointments already scheduled     Oct 19, 2017 12:35 PM CDT   (Arrive by 12:20 PM)   Return Visit with Pedro Gomez MD   [facility] ([facility])    [address] [place] [address]  4th [facility] [address]   [phone]            Nov 08, 2017 10:00 AM CST   Lab with [facility]   [facility])    [address] [place] [address]   [phone]            Nov 08, 2017 11:10 AM CST   (Arrive by 10:40 AM)   Return Visit with Marzena Martin MD   [facility] ([facility])    [address] [place] [address]   [phone]            Nov 16, 2017  3:00 PM CST   (Arrive by 2:45 PM)   Pacemaker Check with  Cv Device 1   [facility])    [address] [place] [address]   [phone]            Nov 16, 2017  3:30 PM CST   (Arrive by 3:15 PM)   Return Visit with Aishwarya Mir MD   [facility])    [address] [place] [address]   [phone]              Future tests that were ordered for you today     Open Future Orders        Priority Expected Expires Ordered    Lipid panel reflex to direct LDL - -(Today) Routine 10/11/2017 10/11/2018 10/11/2017    Comprehensive metabolic panel Routine 10/11/2017 10/25/2017 10/11/2017    CBC with platelets Routine 10/11/2017 10/25/2017 10/11/2017            Who to contact     Please call your clinic at  "[phone] to:    Ask questions about your health    Make or cancel appointments    Discuss your medicines    Learn about your test results    Speak to your doctor   If you have compliments or concerns about an experience at your clinic, or if you wish to file a complaint, please contact [facility] Patient Relations at [phone] or email us at [email or web address]         Additional Information About Your Visit        [employer] Information     [employer] is an electronic gateway that provides easy, online access to your medical records. With [employer], you can request a clinic appointment, read your test results, renew a prescription or communicate with your care team.     To sign up for [employer] visit the website at [email or web address]   You will be asked to enter the access code listed below, as well as some personal information. Please follow the directions to create your username and password.     Your access code is: [email or web address]  Expires: 2017  8:50 AM     Your access code will  in 90 days. If you need help or a new code, please contact your [facility] or call [phone] for assistance.        Care EveryWhere ID     This is your Care EveryWhere ID. This could be used by other organizations to access your [facility] medical records  [id number]        Your Vitals Were     Height BMI (Body Mass Index)                5' 5\" (1.651 m) 25.13 kg/m2           Blood Pressure from Last 3 Encounters:   10/11/17 138/76   10/11/17 138/76   17 148/74    Weight from Last 3 Encounters:   10/11/17 151 lb (68.5 kg)   10/11/17 151 lb 8 oz (68.7 kg)   17 155 lb 12.8 oz (70.7 kg)               Primary Care Provider Office Phone # Fax #    Pedro Gomez [phone]        [address] [address]        Equal Access to Services     FAYE GUILLORY AH: Hadii rohith Broussard, " kelly rios sofyh[place] ordonez ah. So [facility] [phone].    ATENCIÓN: Si chika zuleta, tiene a braga disposición servicios gratuitos de asistencia lingüística. Thomas al [phone].    We comply with applicable federal civil rights laws and [place] laws. We do not discriminate on the basis of race, color, national origin, age, disability, sex, sexual orientation, or gender identity.            Thank you!     Thank you for choosing [facility]  for your care. Our goal is always to provide you with excellent care. Hearing back from our patients is one way we can continue to improve our services. Please take a few minutes to complete the written survey that you may receive in the mail after your visit with us. Thank you!             Your Updated Medication List - Protect others around you: Learn how to safely use, store and throw away your medicines at www.disposemymeds.org.          This list is accurate as of: 10/11/17  3:12 PM.  Always use your most recent med list.                   Brand Name Dispense Instructions for use Diagnosis    allopurinol 100 MG tablet    ZYLOPRIM    90 tablet    Take 1 tablet (100 mg) by mouth daily    Gout       aspirin 81 MG tablet      Take 1 tablet by mouth daily.        clopidogrel 75 MG tablet    PLAVIX    90 tablet    Take 1 tablet (75 mg) by mouth daily    Coronary artery disease with other form of angina pectoris, NSTEMI (non-ST elevated myocardial infarction) (H), S/P angioplasty with stent       cyanocobalamin 1000 MCG tablet    vitamin  B-12     Take 1 tablet by mouth daily. As directed        furosemide 40 MG tablet    LASIX    90 tablet    Take 1 tablet (40 mg) by mouth daily    Essential hypertension       hydrALAZINE 100 MG Tabs tablet    APRESOLINE    270 tablet    Take 1 tablet (100 mg) by mouth 3 times daily    Essential hypertension       isosorbide mononitrate 20 MG tablet    ISMO/MONOKET    540 tablet    Take 2  tablets (40 mg) by mouth 3 times daily    Coronary artery disease with other form of angina pectoris       itraconazole 100 MG capsule    SPORANOX    180 capsule    Take 2 capsules (200 mg) by mouth daily    Dermatophytosis of nail       metoprolol 100 MG tablet    LOPRESSOR    180 tablet    TAKE ONE TABLET BY MOUTH TWICE DAILY    Essential hypertension       multivitamin  with lutein Caps per capsule      Take 1 capsule by mouth daily        NITROSTAT 0.4 MG sublingual tablet   Generic drug:  nitroGLYcerin     25 tablet    DISSOLVE ONE TABLET UNDER THE TONGUE EVERY 5 MINUTES AS NEEDED FOR CHEST PAIN.  DO NOT EXCEED A TOTAL OF 3 DOSES IN 15 MINUTES. CALL 911.    CAD (coronary artery disease)       omeprazole 40 MG capsule    priLOSEC    90 capsule    TAKE ONE CAPSULE BY MOUTH ONCE DAILY    Esophageal reflux       order for DME     1 each    Equipment being ordered: Left hand and wrist brace    Pain of left hand, Fall from standing, initial encounter       potassium chloride 10 MEQ tablet    K-TAB,KLOR-CON    180 tablet    Take 1 tablet (10 mEq) by mouth 2 times daily    Hypokalemia       pravastatin 80 MG tablet    PRAVACHOL    90 tablet    TAKE ONE TABLET BY MOUTH ONCE DAILY    Hyperlipidemia LDL goal <130       timolol 0.5 % ophthalmic solution    TIMOPTIC

## 2017-10-11 NOTE — LETTER
10/11/2017       RE: Tessa Mansfield  [address]  [place] [address]     Dear Colleague,    Thank you for referring your patient, Tessa Mansfield, to the [facility] at [facility]. Please see a copy of my visit note below.    CHIEF COMPLAINT:    HISTORY OF PRESENT ILLNESS  Ms. Mansfield is a pleasant 82 year old year old female who presents to clinic today with left foot pain x 1 week.  She does not recall any acute event or misstep.  Just recalls ankle with abrupt onset of medial ankle pain.  Minimal swelling since resolved.    Location: medial ankle  Quality:    Duration: 1 week  Severity: 5/10 at worst  Timing: occurs with ambulation  Context: hx of gout, no hx of ankle sprains  Modifying factors:  resting and non-use makes it better, movement and use makes it worse  Associated signs & symptoms: no numbness or tingling.  No soft tissue erythema  Previous similar pain: no  Additional history: as documented    MEDICAL HISTORY  Patient Active Problem List   Diagnosis     Degeneration of cervical intervertebral disc     Nonallopathic lesion of cervical region     Nonallopathic lesion of thoracic region     Coronary artery disease     Dizziness and giddiness     Edema     Esophageal reflux     Gout     Essential hypertension     Obstructive sleep apnea     Osteomalacia     Post-menopausal osteoporosis     Cardiac Pacemaker- Medtronic, dual chamber- NOT dependant     Transient complete heart block (H)     Sinus node dysfunction (H)     Disturbance of skin sensation     NSTEMI (non-ST elevated myocardial infarction) (H)     Arrhythmia     Chest pain       Current Outpatient Prescriptions   Medication Sig Dispense Refill     pravastatin (PRAVACHOL) 80 MG tablet TAKE ONE TABLET BY MOUTH ONCE DAILY 90 tablet 3     allopurinol (ZYLOPRIM) 100 MG tablet Take 1 tablet (100 mg) by mouth daily 90 tablet 1     furosemide (LASIX) 40 MG tablet Take 1 tablet (40 mg) by  "mouth daily 90 tablet 3     NITROSTAT 0.4 MG sublingual tablet DISSOLVE ONE TABLET UNDER THE TONGUE EVERY 5 MINUTES AS NEEDED FOR CHEST PAIN.  DO NOT EXCEED A TOTAL OF 3 DOSES IN 15 MINUTES. CALL 911. 25 tablet 3     isosorbide mononitrate (ISMO/MONOKET) 20 MG tablet Take 2 tablets (40 mg) by mouth 3 times daily 540 tablet 3     potassium chloride (K-TAB,KLOR-CON) 10 MEQ tablet Take 1 tablet (10 mEq) by mouth 2 times daily 180 tablet 2     clopidogrel (PLAVIX) 75 MG tablet Take 1 tablet (75 mg) by mouth daily 90 tablet 2     order for DME Equipment being ordered: Left hand and wrist brace 1 each 0     itraconazole (SPORANOX) 100 MG capsule Take 2 capsules (200 mg) by mouth daily 180 capsule 0     metoprolol (LOPRESSOR) 100 MG tablet TAKE ONE TABLET BY MOUTH TWICE DAILY 180 tablet 3     omeprazole (PRILOSEC) 40 MG capsule TAKE ONE CAPSULE BY MOUTH ONCE DAILY 90 capsule 3     hydrALAZINE (APRESOLINE) 100 MG TABS Take 1 tablet (100 mg) by mouth 3 times daily 270 tablet 3     timolol (TIMOPTIC) 0.5 % ophthalmic solution        multivitamin  with lutein (OCUVITE WITH LTEIN) CAPS Take 1 capsule by mouth daily       aspirin 81 MG tablet Take 1 tablet by mouth daily.       cyanocolbalamin (VITAMIN B-12) 1000 MCG tablet Take 1 tablet by mouth daily. As directed         Allergies   Allergen Reactions     Bactrim [Sulfamethoxazole W/Trimethoprim] Other (See Comments)     Patient unable to recall     Biaxin [Clarithromycin]      Chlorthalidone Nausea and Vomiting     Clonidine      Codeine Sulfate Itching     Darvon [Propoxyphene Hcl]      Gabapentin Other (See Comments) and Fatigue     \"felt drunk\"     Indocin      Levaquin [Levofloxacin Hemihydrate]      Lyrica Fatigue     Morphine Sulfate      Percocet [Oxycodone-Acetaminophen]      Spironolactone Other (See Comments)     Dehydrated       Terazosin      Ciprofloxacin Rash     Simvastatin Palpitations     Muscle weakness, leg cramping         Family History   Problem Relation " "Age of Onset     CANCER Sister 82     bladder cancer       Additional medical/Social/Surgical histories reviewed in [facility] and updated as appropriate.     REVIEW OF SYSTEMS (10/11/2017)  CONSTITUTIONAL: Denies fever and weight loss  EYES: Denies acute vision changes  ENT: Denies hearing changes or difficulty swallowing  CARDIAC: Denies chest pain or edema. +blood thinners  RESPIRATORY: Denies dyspnea, cough or wheeze  GASTROINTESTINAL: GERD  MUSCULOSKELETAL: See HPI  SKIN: Denies any recent rash or lesion  NEUROLOGICAL: Denies numbness or focal weakness  PSYCHIATRIC: No history of psychiatric symptoms or problems  ENDOCRINE: Denies current diagnosis of diabetes  HEMATOLOGY: Denies episodes of easy bleeding     PHYSICAL EXAM  /76  Ht 5' 5\" (1.651 m)  Wt 151 lb (68.5 kg)  BMI 25.13 kg/m2    General Appearance: Well appearing, alert, in no acute distress, well-hydrated, and well nourished  Skin: No rashes, lesions, or ecchymosis present  Cardiovascular: no signs of upper or lower extremity edema  Respiratory: no respiratory distress, no audible wheezing, no labored breathing, symmetric thoracic excursion  Psychiatric: mood and affect are appropriate, patient is oriented to time, place and person  Musculoskeletal:  General  - normal appearance, in no obvious distress  CV  - normal pulses at posterior tib and dorsalis pedis  Pulm  - normal respiratory pattern, non-labored  Musculoskeletal - ankle  - stance: Mildly antalgic, bearing weight without difficulty  - inspection: No swelling medially or laterally, normal bone and joint alignment, no obvious deformity  - palpation: tenderness over anterior border of deltoid ligament, no tenderness over lateral or medial malleoli, navicular, or base of 5th MT  - ROM: intact globally but limited inversion and eversion secondary to pain.   - strength: 5/5 in eversion and inversion despite pain, 5/5 in all other planes  - special tests:  pain with inversion stress  (-) " anterior drawer  (-) talar tilt  (-) Tinel's  (-) squeeze test  Neuro  - no sensory or motor deficit, grossly normal coordination, normal muscle tone  Skin  -no ecchymosis, no warmth or induration, no obvious rash  Psych  - interactive, appropriate, normal mood and affect    IMAGING: Final results and radiologist's interpretation, available in the [facility] health record. Images were reviewed with the patient/family members in the office today. 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  NO acute osseous abnormalities.     ASSESSMENT & PLAN  Ms. Mansfield is a 82 year old year old female who presents to clinic today with left ankle pain x 1 week.  Diagnosis: Ankle sprain    -Lace-up ankle brace  -Home ankle exercises provided  -Ice instructions  -Continue tylenol  -Follow up: 2 weeks if pain persists    Anthony Nolan DO, MANUELM  Primary Care Sports Medicine

## 2017-10-11 NOTE — PATIENT INSTRUCTIONS
[facility]: [phone]     [facility] Medication Refill Request Information:  * Please contact your pharmacy regarding ANY request for medication refills.  ** [facility] Prescription Fax = [phone]  * Please allow 3 business days for routine medication refills.  * Please allow 5 business days for controlled substance medication refills.     [facility] Test Result notification information:  *You will be notified with in 7-10 days of your appointment day regarding the results of your test.  If you are on MyChart you will be notified as soon as the provider has reviewed the results and signed off on them.

## 2017-10-11 NOTE — PROGRESS NOTES
"SUBJECTIVE:    Pt is a 82 year old female with pmh of     Patient Active Problem List   Diagnosis     Degeneration of cervical intervertebral disc     Nonallopathic lesion of cervical region     Nonallopathic lesion of thoracic region     Coronary artery disease     Dizziness and giddiness     Edema     Esophageal reflux     Gout     Essential hypertension     Obstructive sleep apnea     Osteomalacia     Post-menopausal osteoporosis     Cardiac Pacemaker- Medtronic, dual chamber- NOT dependant     Transient complete heart block (H)     Sinus node dysfunction (H)     Disturbance of skin sensation     NSTEMI (non-ST elevated myocardial infarction) (H)     Arrhythmia     Chest pain       who is here for evaluation of had concerns including Pain and Ear Problem.    Here for a few things.  One, about a week, pain, new, left medial ankle. Hurts to wt bear. No trauma. No red, warm, swollen. Is mild tender.     Two, right otalgia. No change chronic [place] (declines w/u that). No ear drainage. No recent URI or head trauma. Is in midst of dental work in that area, might need root canal.     Allergies   Allergen Reactions     Bactrim [Sulfamethoxazole W/Trimethoprim] Other (See Comments)     Patient unable to recall     Biaxin [Clarithromycin]      Chlorthalidone Nausea and Vomiting     Clonidine      Codeine Sulfate Itching     Darvon [Propoxyphene Hcl]      Gabapentin Other (See Comments) and Fatigue     \"felt drunk\"     Indocin      Levaquin [Levofloxacin Hemihydrate]      Lyrica Fatigue     Morphine Sulfate      Percocet [Oxycodone-Acetaminophen]      Spironolactone Other (See Comments)     Dehydrated       Terazosin      Ciprofloxacin Rash     Simvastatin Palpitations     Muscle weakness, leg cramping             Current Outpatient Prescriptions   Medication Sig Dispense Refill     pravastatin (PRAVACHOL) 80 MG tablet TAKE ONE TABLET BY MOUTH ONCE DAILY 90 tablet 3     allopurinol (ZYLOPRIM) 100 MG tablet Take 1 tablet (100 " mg) by mouth daily 90 tablet 1     furosemide (LASIX) 40 MG tablet Take 1 tablet (40 mg) by mouth daily 90 tablet 3     NITROSTAT 0.4 MG sublingual tablet DISSOLVE ONE TABLET UNDER THE TONGUE EVERY 5 MINUTES AS NEEDED FOR CHEST PAIN.  DO NOT EXCEED A TOTAL OF 3 DOSES IN 15 MINUTES. CALL 911. 25 tablet 3     isosorbide mononitrate (ISMO/MONOKET) 20 MG tablet Take 2 tablets (40 mg) by mouth 3 times daily 540 tablet 3     potassium chloride (K-TAB,KLOR-CON) 10 MEQ tablet Take 1 tablet (10 mEq) by mouth 2 times daily 180 tablet 2     clopidogrel (PLAVIX) 75 MG tablet Take 1 tablet (75 mg) by mouth daily 90 tablet 2     order for DME Equipment being ordered: Left hand and wrist brace 1 each 0     itraconazole (SPORANOX) 100 MG capsule Take 2 capsules (200 mg) by mouth daily 180 capsule 0     metoprolol (LOPRESSOR) 100 MG tablet TAKE ONE TABLET BY MOUTH TWICE DAILY 180 tablet 3     omeprazole (PRILOSEC) 40 MG capsule TAKE ONE CAPSULE BY MOUTH ONCE DAILY 90 capsule 3     hydrALAZINE (APRESOLINE) 100 MG TABS Take 1 tablet (100 mg) by mouth 3 times daily 270 tablet 3     timolol (TIMOPTIC) 0.5 % ophthalmic solution        multivitamin  with lutein (OCUVITE WITH LTEIN) CAPS Take 1 capsule by mouth daily       aspirin 81 MG tablet Take 1 tablet by mouth daily.       cyanocolbalamin (VITAMIN B-12) 1000 MCG tablet Take 1 tablet by mouth daily. As directed         Social History   Substance Use Topics     Smoking status: Former Smoker     Packs/day: 0.30     Years: 15.00     Types: Cigarettes     Smokeless tobacco: Never Used      Comment: Quit 22+ years ago     Alcohol use Not on file       No sore throat, runny nose, fever. Ear pain comes/goes. Not there now. Not related to chewing    OBJECTIVE:  /76  Pulse 76  Temp 97.8  F (36.6  C) (Oral)  Resp 16  Wt 68.7 kg (151 lb 8 oz)  SpO2 97%  Breastfeeding? No  BMI 25.21 kg/m2  GENERAL APPEARANCE: Alert, no acute distress  EYES: PERRL, EOM normal, conjunctiva and lids  normal  HENT: Ears and TMs normal, oral mucosa and posterior oropharynx normal  NECK: No adenopathy,masses or thyromegaly  MS: Left medial ankle tender just distal to malleolus, worse w/ ROM. Otherwise normal foot n/v/ortho exams  NEURO: Alert, oriented, speech and mentation normal  PSYCHE: mentation appears normal, affect and mood normal    ASSESSMENT/PLAN:    Left ankle pain, medial. See Sp Med  Ear pain: likely dental. She'll f/u dentist.    CAD stable, no angina, sees heart MD soon, will order pre labs    CKD: sees renal soon, will order pre labs    Flu shot now    DIALLO RITTER MD

## 2017-10-11 NOTE — PATIENT INSTRUCTIONS
ANKLE SPRAIN:  Symptoms include pain, bruising, and swelling around ankle, often on outer side. The pain may be sharp with activity and dull at rest. You may be walking with a limp at first. The swelling is often present after the pain resolves. If your pain is severe, not improving over 5-7 days, or shoots up your leg, let your physician know as you may need crutches and an immobilizer.  Treatment:  -Ice 10-15 minutes several times a day for the first few days and then after activity.  -Walk as tolerated. Restrict other activities until pain allows. Biking, pool running or swimming are good alternative activities.  -You may benefit from an ankle brace for support while strengthening with therapy  -Some require immobilzation and crutches initially  Strengthening therapy  -Ice 10-15 minutes after activity. (or Ice bath 5-7min)  -often hip and ankle weakness leads to lower extremity (foot, ankle, shin, and knee) problems so a lot of focus will be on core strength and balance  - recommend yoga for core strengthening and stretching  -Perform exercises as instructed through handout or formal therapy if doing. Until then start with the following:  -ankle strengthening-   1) balance on one foot 1-2 min daily (perform on both feet)   2) arch raises- tighten bottom of foot like trying to shorten foot and hold x 3-5 sec, repeat 5 times   3) ankle exercises (4 way with theraband)- 3 sets of 10-15 (fatigue) daily   5) heel raises on step-- once painfree lowering on both, start to slowly lower on one foot  Return to activity guidelines:  -If it hurts, do not do it!  -wear ankle brace until pain free with full activity and exercises for at least 6 weeks  -Must meet each goal before return to play:  1) painfree jogging straigt line  2) pain free sprints  3) pain free cutting/ changing directions

## 2017-10-11 NOTE — PROGRESS NOTES
CHIEF COMPLAINT:    HISTORY OF PRESENT ILLNESS  Ms. Mansfield is a pleasant 82 year old year old female who presents to clinic today with left foot pain x 1 week.  She does not recall any acute event or misstep.  Just recalls ankle with abrupt onset of medial ankle pain.  Minimal swelling since resolved.    Location: medial ankle  Quality:    Duration: 1 week  Severity: 5/10 at worst  Timing: occurs with ambulation  Context: hx of gout, no hx of ankle sprains  Modifying factors:  resting and non-use makes it better, movement and use makes it worse  Associated signs & symptoms: no numbness or tingling.  No soft tissue erythema  Previous similar pain: no  Additional history: as documented    MEDICAL HISTORY  Patient Active Problem List   Diagnosis     Degeneration of cervical intervertebral disc     Nonallopathic lesion of cervical region     Nonallopathic lesion of thoracic region     Coronary artery disease     Dizziness and giddiness     Edema     Esophageal reflux     Gout     Essential hypertension     Obstructive sleep apnea     Osteomalacia     Post-menopausal osteoporosis     Cardiac Pacemaker- [employer], dual chamber- NOT dependant     Transient complete heart block (H)     Sinus node dysfunction (H)     Disturbance of skin sensation     NSTEMI (non-ST elevated myocardial infarction) (H)     Arrhythmia     Chest pain       Current Outpatient Prescriptions   Medication Sig Dispense Refill     pravastatin (PRAVACHOL) 80 MG tablet TAKE ONE TABLET BY MOUTH ONCE DAILY 90 tablet 3     allopurinol (ZYLOPRIM) 100 MG tablet Take 1 tablet (100 mg) by mouth daily 90 tablet 1     furosemide (LASIX) 40 MG tablet Take 1 tablet (40 mg) by mouth daily 90 tablet 3     NITROSTAT 0.4 MG sublingual tablet DISSOLVE ONE TABLET UNDER THE TONGUE EVERY 5 MINUTES AS NEEDED FOR CHEST PAIN.  DO NOT EXCEED A TOTAL OF 3 DOSES IN 15 MINUTES. CALL 911. 25 tablet 3     isosorbide mononitrate (ISMO/MONOKET) 20 MG tablet Take 2 tablets (40 mg) by  "mouth 3 times daily 540 tablet 3     potassium chloride (K-TAB,KLOR-CON) 10 MEQ tablet Take 1 tablet (10 mEq) by mouth 2 times daily 180 tablet 2     clopidogrel (PLAVIX) 75 MG tablet Take 1 tablet (75 mg) by mouth daily 90 tablet 2     order for DME Equipment being ordered: Left hand and wrist brace 1 each 0     itraconazole (SPORANOX) 100 MG capsule Take 2 capsules (200 mg) by mouth daily 180 capsule 0     metoprolol (LOPRESSOR) 100 MG tablet TAKE ONE TABLET BY MOUTH TWICE DAILY 180 tablet 3     omeprazole (PRILOSEC) 40 MG capsule TAKE ONE CAPSULE BY MOUTH ONCE DAILY 90 capsule 3     hydrALAZINE (APRESOLINE) 100 MG TABS Take 1 tablet (100 mg) by mouth 3 times daily 270 tablet 3     timolol (TIMOPTIC) 0.5 % ophthalmic solution        multivitamin  with lutein (OCUVITE WITH LTEIN) CAPS Take 1 capsule by mouth daily       aspirin 81 MG tablet Take 1 tablet by mouth daily.       cyanocolbalamin (VITAMIN B-12) 1000 MCG tablet Take 1 tablet by mouth daily. As directed         Allergies   Allergen Reactions     Bactrim [Sulfamethoxazole W/Trimethoprim] Other (See Comments)     Patient unable to recall     Biaxin [Clarithromycin]      Chlorthalidone Nausea and Vomiting     Clonidine      Codeine Sulfate Itching     Darvon [Propoxyphene Hcl]      Gabapentin Other (See Comments) and Fatigue     \"felt drunk\"     Indocin      Levaquin [Levofloxacin Hemihydrate]      Lyrica Fatigue     Morphine Sulfate      Percocet [Oxycodone-Acetaminophen]      Spironolactone Other (See Comments)     Dehydrated       Terazosin      Ciprofloxacin Rash     Simvastatin Palpitations     Muscle weakness, leg cramping         Family History   Problem Relation Age of Onset     CANCER Sister 82     bladder cancer       Additional medical/Social/Surgical histories reviewed in [facility] and updated as appropriate.     REVIEW OF SYSTEMS (10/11/2017)  CONSTITUTIONAL: Denies fever and weight loss  EYES: Denies acute vision changes  ENT: Denies hearing changes " "or difficulty swallowing  CARDIAC: Denies chest pain or edema. +blood thinners  RESPIRATORY: Denies dyspnea, cough or wheeze  GASTROINTESTINAL: GERD  MUSCULOSKELETAL: See HPI  SKIN: Denies any recent rash or lesion  NEUROLOGICAL: Denies numbness or focal weakness  PSYCHIATRIC: No history of psychiatric symptoms or problems  ENDOCRINE: Denies current diagnosis of diabetes  HEMATOLOGY: Denies episodes of easy bleeding     PHYSICAL EXAM  /76  Ht 5' 5\" (1.651 m)  Wt 151 lb (68.5 kg)  BMI 25.13 kg/m2    General Appearance: Well appearing, alert, in no acute distress, well-hydrated, and well nourished  Skin: No rashes, lesions, or ecchymosis present  Cardiovascular: no signs of upper or lower extremity edema  Respiratory: no respiratory distress, no audible wheezing, no labored breathing, symmetric thoracic excursion  Psychiatric: mood and affect are appropriate, patient is oriented to time, place and person  Musculoskeletal:  General  - normal appearance, in no obvious distress  CV  - normal pulses at posterior tib and dorsalis pedis  Pulm  - normal respiratory pattern, non-labored  Musculoskeletal - ankle  - stance: Mildly antalgic, bearing weight without difficulty  - inspection: No swelling medially or laterally, normal bone and joint alignment, no obvious deformity  - palpation: tenderness over anterior border of deltoid ligament, no tenderness over lateral or medial malleoli, navicular, or base of 5th MT  - ROM: intact globally but limited inversion and eversion secondary to pain.   - strength: 5/5 in eversion and inversion despite pain, 5/5 in all other planes  - special tests:  pain with inversion stress  (-) anterior drawer  (-) talar tilt  (-) Tinel's  (-) squeeze test  Neuro  - no sensory or motor deficit, grossly normal coordination, normal muscle tone  Skin  -no ecchymosis, no warmth or induration, no obvious rash  Psych  - interactive, appropriate, normal mood and affect    IMAGING: Final results " and radiologist's interpretation, available in the [facility] health record. Images were reviewed with the patient/family members in the office today. 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  NO acute osseous abnormalities.     ASSESSMENT & PLAN  Ms. Mansfield is a 82 year old year old female who presents to clinic today with left ankle pain x 1 week.  Diagnosis: Ankle sprain    -Lace-up ankle brace  -Home ankle exercises provided  -Ice instructions  -Continue tylenol  -Follow up: 2 weeks if pain persists    Anthony Nolan DO, [facility]  Primary Care Sports Medicine

## 2017-10-11 NOTE — NURSING NOTE
"Injectable Influenza Immunization Documentation    1.  Has the patient received the information for the injectable influenza vaccine? YES     2. Is the patient 6 months of age or older? YES     3. Does the patient have any of the following contraindications?         Severe allergy to eggs?  No     Severe allergic reaction to previous influenza vaccines? No   Severe allergy to latex? No       History of Guillain-Rochester syndrome? No     Currently have a temperature greater than 100.4F? No        4.  Severely egg allergic patients should have flu vaccine eligibility assessed by an MD, RN, or pharmacist, and those who received flu vaccine should be observed for 15 min by an MD, RN, Pharmacist, Medical Technician, or member of clinic staff.\": YES    5. Latex-allergic patients should be given latex-free influenza vaccine Yes. Please reference the Vaccine latex table to determine if your clinic s product is latex-containing.       Vaccination given by Ana Luisa Love LPN at 2:11 PM on 10/11/2017.        "

## 2017-10-14 DIAGNOSIS — I10 ESSENTIAL HYPERTENSION: ICD-10-CM

## 2017-10-16 RX ORDER — HYDRALAZINE HYDROCHLORIDE 100 MG/1
100 TABLET, FILM COATED ORAL 3 TIMES DAILY
Qty: 270 TABLET | Refills: 3 | Status: SHIPPED | OUTPATIENT
Start: 2017-10-16 | End: 2018-06-20

## 2017-10-17 NOTE — TELEPHONE ENCOUNTER
hydrALAZINE       Last Written Prescription Date:  6/21/16  Last Fill Quantity: 270,   # refills: 3  Last Office Visit : 10/11/17  Future Office visit:  10/19/17  BP Readings from Last 3 Encounters:   10/11/17 138/76   10/11/17 138/76   05/04/17 148/74

## 2017-10-30 ENCOUNTER — TELEPHONE (OUTPATIENT)
Dept: NEPHROLOGY | Facility: CLINIC | Age: 82
End: 2017-10-30

## 2017-11-02 DIAGNOSIS — R60.9 EDEMA: ICD-10-CM

## 2017-11-02 DIAGNOSIS — I10 ESSENTIAL HYPERTENSION: Primary | ICD-10-CM

## 2017-11-03 DIAGNOSIS — K21.9 ESOPHAGEAL REFLUX: ICD-10-CM

## 2017-11-06 RX ORDER — OMEPRAZOLE 40 MG/1
CAPSULE, DELAYED RELEASE ORAL
Qty: 90 CAPSULE | Refills: 3 | Status: SHIPPED | OUTPATIENT
Start: 2017-11-06 | End: 2018-12-10

## 2017-11-08 ENCOUNTER — OFFICE VISIT (OUTPATIENT)
Dept: NEPHROLOGY | Facility: CLINIC | Age: 82
End: 2017-11-08
Attending: INTERNAL MEDICINE
Payer: COMMERCIAL

## 2017-11-08 VITALS
SYSTOLIC BLOOD PRESSURE: 134 MMHG | TEMPERATURE: 97.8 F | HEART RATE: 62 BPM | OXYGEN SATURATION: 98 % | HEIGHT: 65 IN | DIASTOLIC BLOOD PRESSURE: 73 MMHG | BODY MASS INDEX: 24.96 KG/M2 | WEIGHT: 149.8 LBS

## 2017-11-08 DIAGNOSIS — I10 ESSENTIAL HYPERTENSION: ICD-10-CM

## 2017-11-08 DIAGNOSIS — R25.2 CRAMP OF LIMB: Primary | ICD-10-CM

## 2017-11-08 DIAGNOSIS — R60.9 EDEMA: ICD-10-CM

## 2017-11-08 DIAGNOSIS — N18.9 CHRONIC KIDNEY DISEASE, UNSPECIFIED CKD STAGE: ICD-10-CM

## 2017-11-08 DIAGNOSIS — I25.9 CHRONIC ISCHEMIC HEART DISEASE: ICD-10-CM

## 2017-11-08 DIAGNOSIS — E87.6 HYPOKALEMIA: ICD-10-CM

## 2017-11-08 DIAGNOSIS — N18.30 CKD (CHRONIC KIDNEY DISEASE) STAGE 3, GFR 30-59 ML/MIN (H): ICD-10-CM

## 2017-11-08 LAB
ALBUMIN SERPL-MCNC: 3.7 G/DL (ref 3.4–5)
ALP SERPL-CCNC: 83 U/L (ref 40–150)
ALT SERPL W P-5'-P-CCNC: 15 U/L (ref 0–50)
ANION GAP SERPL CALCULATED.3IONS-SCNC: 9 MMOL/L (ref 3–14)
AST SERPL W P-5'-P-CCNC: 14 U/L (ref 0–45)
BILIRUB SERPL-MCNC: 0.6 MG/DL (ref 0.2–1.3)
BUN SERPL-MCNC: 54 MG/DL (ref 7–30)
CALCIUM SERPL-MCNC: 8.8 MG/DL (ref 8.5–10.1)
CHLORIDE SERPL-SCNC: 104 MMOL/L (ref 94–109)
CHOLEST SERPL-MCNC: 119 MG/DL
CO2 SERPL-SCNC: 26 MMOL/L (ref 20–32)
CREAT SERPL-MCNC: 2.36 MG/DL (ref 0.52–1.04)
CREAT UR-MCNC: 54 MG/DL
DEPRECATED CALCIDIOL+CALCIFEROL SERPL-MC: 36 UG/L (ref 20–75)
ERYTHROCYTE [DISTWIDTH] IN BLOOD BY AUTOMATED COUNT: 14.6 % (ref 10–15)
FERRITIN SERPL-MCNC: 41 NG/ML (ref 8–252)
GFR SERPL CREATININE-BSD FRML MDRD: 20 ML/MIN/1.7M2
GLUCOSE SERPL-MCNC: 88 MG/DL (ref 70–99)
HCT VFR BLD AUTO: 36.3 % (ref 35–47)
HDLC SERPL-MCNC: 53 MG/DL
HGB BLD-MCNC: 10.6 G/DL (ref 11.7–15.7)
IRON SATN MFR SERPL: 27 % (ref 15–46)
IRON SERPL-MCNC: 85 UG/DL (ref 35–180)
LDLC SERPL CALC-MCNC: 32 MG/DL
MAGNESIUM SERPL-MCNC: 2.4 MG/DL (ref 1.6–2.3)
MCH RBC QN AUTO: 29.3 PG (ref 26.5–33)
MCHC RBC AUTO-ENTMCNC: 29.2 G/DL (ref 31.5–36.5)
MCV RBC AUTO: 100 FL (ref 78–100)
NONHDLC SERPL-MCNC: 66 MG/DL
PHOSPHATE SERPL-MCNC: 3.3 MG/DL (ref 2.5–4.5)
PLATELET # BLD AUTO: 181 10E9/L (ref 150–450)
POTASSIUM SERPL-SCNC: 3.5 MMOL/L (ref 3.4–5.3)
PROT SERPL-MCNC: 7.1 G/DL (ref 6.8–8.8)
PROT UR-MCNC: 0.08 G/L
PROT/CREAT 24H UR: 0.15 G/G CR (ref 0–0.2)
PTH-INTACT SERPL-MCNC: 144 PG/ML (ref 12–72)
RBC # BLD AUTO: 3.62 10E12/L (ref 3.8–5.2)
SODIUM SERPL-SCNC: 139 MMOL/L (ref 133–144)
TIBC SERPL-MCNC: 318 UG/DL (ref 240–430)
TRIGL SERPL-MCNC: 170 MG/DL
WBC # BLD AUTO: 10.6 10E9/L (ref 4–11)

## 2017-11-08 PROCEDURE — 82728 ASSAY OF FERRITIN: CPT | Performed by: FAMILY MEDICINE

## 2017-11-08 PROCEDURE — 36415 COLL VENOUS BLD VENIPUNCTURE: CPT | Performed by: FAMILY MEDICINE

## 2017-11-08 PROCEDURE — 82306 VITAMIN D 25 HYDROXY: CPT | Performed by: FAMILY MEDICINE

## 2017-11-08 PROCEDURE — 83970 ASSAY OF PARATHORMONE: CPT | Performed by: FAMILY MEDICINE

## 2017-11-08 PROCEDURE — 99213 OFFICE O/P EST LOW 20 MIN: CPT | Mod: ZF

## 2017-11-08 PROCEDURE — 84100 ASSAY OF PHOSPHORUS: CPT | Performed by: FAMILY MEDICINE

## 2017-11-08 PROCEDURE — 84156 ASSAY OF PROTEIN URINE: CPT | Performed by: INTERNAL MEDICINE

## 2017-11-08 PROCEDURE — 83540 ASSAY OF IRON: CPT | Performed by: FAMILY MEDICINE

## 2017-11-08 PROCEDURE — 83735 ASSAY OF MAGNESIUM: CPT | Performed by: FAMILY MEDICINE

## 2017-11-08 PROCEDURE — 83550 IRON BINDING TEST: CPT | Performed by: FAMILY MEDICINE

## 2017-11-08 RX ORDER — POTASSIUM CHLORIDE 750 MG/1
TABLET, EXTENDED RELEASE ORAL
Qty: 270 TABLET | Refills: 3 | Status: SHIPPED | OUTPATIENT
Start: 2017-11-08 | End: 2019-01-05

## 2017-11-08 ASSESSMENT — PAIN SCALES - GENERAL: PAINLEVEL: NO PAIN (0)

## 2017-11-08 NOTE — MR AVS SNAPSHOT
After Visit Summary   11/8/2017    Tessa Mansfield    MRN: [id number]           Patient Information     Date Of Birth          1935        Visit Information        Provider Department      11/8/2017 11:10 AM Marzena Martin MD [facility]        Today's Diagnoses     Cramp of limb    -  1    Hypokalemia        CKD (chronic kidney disease) stage 3, GFR 30-59 ml/min           Follow-ups after your visit        Follow-up notes from your care team     Return in about 9 months (around 8/8/2018).      Your next 10 appointments already scheduled     Nov 16, 2017  3:00 PM CST   (Arrive by 2:45 PM)   Pacemaker Check with Uc Cv Device 1   [facility] ([facility])    [address] [place] [id number] [phone]            Nov 16, 2017  3:30 PM CST   (Arrive by 3:15 PM)   Return Visit with Aishwarya Mir MD   [facility] ([facility])    [address] [place] [address] [phone]            Aug 08, 2018  9:05 AM CDT   (Arrive by 8:35 AM)   Return Visit with Marzena Martin MD   [facility] ([facility])    [address] [place] [address] [phone]              Who to contact     If you have questions or need follow up information about today's clinic visit or your schedule please contact [facility] directly at [phone].  Normal or non-critical lab and imaging results will be communicated to you by MyChart, letter or phone within 4 business days after the clinic has received the results. If you do not hear from us within 7 days, please contact the clinic through MyChart or phone. If you have a critical or abnormal lab result, we will notify you by phone as soon as possible.  Submit refill requests through [employer] or call your pharmacy and they will forward the refill request to us.  "Please allow 3 business days for your refill to be completed.          Additional Information About Your Visit        MyChart Information     [facility] lets you send messages to your doctor, view your test results, renew your prescriptions, schedule appointments and more. To sign up, go to [email or web address] . Click on \"Log in\" on the left side of the screen, which will take you to the Welcome page. Then click on \"Sign up Now\" on the right side of the page.     You will be asked to enter the access code listed below, as well as some personal information. Please follow the directions to create your username and password.     Your access code is: [email or web address]  Expires: 2017  7:50 AM     Your access code will  in 90 days. If you need help or a new code, please call your [place] clinic or [phone].        Care EveryWhere ID     This is your Care EveryWhere ID. This could be used by other organizations to access your [place] medical records  [id number]        Your Vitals Were     Pulse Temperature Height Pulse Oximetry BMI (Body Mass Index)       62 97.8  F (36.6  C) (Oral) 1.651 m (5' 5\") 98% 24.93 kg/m2        Blood Pressure from Last 3 Encounters:   17 134/73   10/11/17 138/76   10/11/17 138/76    Weight from Last 3 Encounters:   17 67.9 kg (149 lb 12.8 oz)   10/11/17 68.5 kg (151 lb)   10/11/17 68.7 kg (151 lb 8 oz)              We Performed the Following     Magnesium          Today's Medication Changes          These changes are accurate as of: 17 12:01 PM.  If you have any questions, ask your nurse or doctor.               These medicines have changed or have updated prescriptions.        Dose/Directions    potassium chloride 10 MEQ tablet   Commonly known as:  K-TAB,KLOR-CON   This may have changed:    - how much to take  - how to take this  - when to take this  - additional instructions   Used for:  Hypokalemia   Changed by:  Marzena Martin MD        Take 2 tabs " in AM, 1 tab in PM   Quantity:  270 tablet   Refills:  3            Where to get your medicines      These medications were sent to [id number] - [place], [place] - [address]  [address] [place], [place] [address]     Phone:  [phone]     potassium chloride 10 MEQ tablet                Primary Care Provider Office Phone # Fax #    Pedro Gomez [phone]       [address] [address]        Equal Access to Services     [facility]: Hadii aad ku hadasho Soomaali, waaxda luqadaha, qaybta kaalmada adeegyada, waxay bereket hayluisn carlos whitmore . So [facility] [phone].    ATENCIÓN: Si habla español, tiene a braga disposición servicios gratuitos de asistencia lingüística. [facility] [phone].    We comply with applicable federal civil rights laws and [place] laws. We do not discriminate on the basis of race, color, national origin, age, disability, sex, sexual orientation, or gender identity.            Thank you!     Thank you for choosing [facility]  for your care. Our goal is always to provide you with excellent care. Hearing back from our patients is one way we can continue to improve our services. Please take a few minutes to complete the written survey that you may receive in the mail after your visit with us. Thank you!             Your Updated Medication List - Protect others around you: Learn how to safely use, store and throw away your medicines at www.disposemymeds.org.          This list is accurate as of: 11/8/17 12:01 PM.  Always use your most recent med list.                   Brand Name Dispense Instructions for use Diagnosis    allopurinol 100 MG tablet    ZYLOPRIM    90 tablet    Take 1 tablet (100 mg) by mouth daily    Gout       aspirin 81 MG tablet      Take 1 tablet by mouth daily.        clopidogrel 75 MG tablet    PLAVIX    90 tablet    Take 1 tablet (75 mg) by mouth daily    Coronary artery disease with  other form of angina pectoris, NSTEMI (non-ST elevated myocardial infarction) (H), S/P angioplasty with stent       cyanocobalamin 1000 MCG tablet    vitamin  B-12     Take 1 tablet by mouth daily. As directed        furosemide 40 MG tablet    LASIX    90 tablet    Take 1 tablet (40 mg) by mouth daily    Essential hypertension       hydrALAZINE 100 MG Tabs tablet    APRESOLINE    270 tablet    Take 1 tablet (100 mg) by mouth 3 times daily    Essential hypertension       isosorbide mononitrate 20 MG tablet    ISMO/MONOKET    540 tablet    Take 2 tablets (40 mg) by mouth 3 times daily    Coronary artery disease with other form of angina pectoris       itraconazole 100 MG capsule    SPORANOX    180 capsule    Take 2 capsules (200 mg) by mouth daily    Dermatophytosis of nail       metoprolol 100 MG tablet    LOPRESSOR    180 tablet    TAKE ONE TABLET BY MOUTH TWICE DAILY    Essential hypertension       multivitamin  with lutein Caps per capsule      Take 1 capsule by mouth daily        NITROSTAT 0.4 MG sublingual tablet   Generic drug:  nitroGLYcerin     25 tablet    DISSOLVE ONE TABLET UNDER THE TONGUE EVERY 5 MINUTES AS NEEDED FOR CHEST PAIN.  DO NOT EXCEED A TOTAL OF 3 DOSES IN 15 MINUTES. CALL 911.    CAD (coronary artery disease)       omeprazole 40 MG capsule    priLOSEC    90 capsule    TAKE ONE CAPSULE BY MOUTH ONCE DAILY    Esophageal reflux       order for DME     1 each    Equipment being ordered: Left hand and wrist brace    Pain of left hand, Fall from standing, initial encounter       potassium chloride 10 MEQ tablet    K-TAB,KLOR-CON    270 tablet    Take 2 tabs in AM, 1 tab in PM    Hypokalemia       pravastatin 80 MG tablet    PRAVACHOL    90 tablet    TAKE ONE TABLET BY MOUTH ONCE DAILY    Hyperlipidemia LDL goal <130       timolol 0.5 % ophthalmic solution    TIMOPTIC

## 2017-11-08 NOTE — LETTER
11/8/2017      RE: Tessa Mansfield  [address]  [facility] [address]          Assessment and Plan:   82 year old female with history of long standing HTN, CAD s/p multiple stents, who presents for followup of CKD stage 3, baseline SCr 1.8-2.2 mg/dL without proteinuria.  1. CKD stage 3- baseline SCr 1.8-2.2mg/ dL, which is stable today at 2.36mg/dL  - labs in 6 months to ensure stability  - Scr a little higher today but near range  2. Hypertension- well controlled, on hydralazine 100, furosemide, isosorbide, metoprolol     BP well controlled, would consider switch to carvedilol if BP higher than goal (<140/90)  3. Anemia- mild  4. Electrolytes acid/base- stable  5. Bone- recommend vitamin D with calcium for bone health      Assessment and plan was discussed with patient and she voiced her understanding and agreement.    Reason for Visit:  Tessa Mansfield is a 82 year old female with HTN, who presents for CKD management.     HPI:  She is a pleasant female previously seen by Dr Bowman for CKD stage 3 presumed secondary to hypertensive nephrosclerosis  She has history of CAD s/p multiple stents by Dr Mir (7 stents in all) since 2004 and a pacemaker in 2007. She has had CAD for many yeare 3s, which has been relatively stable, ranging from 1.8-2.2 mg/dL over the  Years. She has been well , without major illness, no hospitalizations in last year.  She will see Dr Mir in coming weeks for cardiac followup.  She denies GI or  issues. Her appetite is good and her  'takes care of her'- he does most of the cooking  She denies dyspnea, can be fairly active though admits to being lazy.      Home BP: 130    Baseline Cr: 1.8-2.2    ROS:  A comprehensive review of systems was obtained and negative, except as noted in the HPI or PMH.    Active Medical Problems:  Patient Active Problem List   Diagnosis     Degeneration of cervical intervertebral disc     Nonallopathic lesion of cervical region     Nonallopathic  "lesion of thoracic region     Coronary artery disease     Dizziness and giddiness     Edema     Esophageal reflux     Gout     Essential hypertension     Obstructive sleep apnea     Osteomalacia     Post-menopausal osteoporosis     Cardiac Pacemaker- [employer], dual chamber- NOT dependant     Transient complete heart block (H)     Sinus node dysfunction (H)     Disturbance of skin sensation     NSTEMI (non-ST elevated myocardial infarction) (H)     Arrhythmia     Chest pain       Personal Hx:   Social History     Social History     Marital status:      Spouse name: N/A     Number of children: 4     Years of education: N/A     Occupational History      [employer]     Social History Main Topics     Smoking status: Former Smoker     Packs/day: 0.30     Years: 15.00     Types: Cigarettes     Smokeless tobacco: Never Used      Comment: Quit 22+ years ago     Alcohol use Not on file     Drug use: No     Sexual activity: Not Currently     Partners: Male     Birth control/ protection: Post-menopausal     Other Topics Concern     Blood Transfusions Yes     Exercise No     Social History Narrative       Allergies:  Allergies   Allergen Reactions     Bactrim [Sulfamethoxazole W/Trimethoprim] Other (See Comments)     Patient unable to recall     Biaxin [Clarithromycin]      Chlorthalidone Nausea and Vomiting     Clonidine      Codeine Sulfate Itching     Darvon [Propoxyphene Hcl]      Gabapentin Other (See Comments) and Fatigue     \"felt drunk\"     Indocin      Levaquin [Levofloxacin Hemihydrate]      Lyrica Fatigue     Morphine Sulfate      Percocet [Oxycodone-Acetaminophen]      Spironolactone Other (See Comments)     Dehydrated       Terazosin      Ciprofloxacin Rash     Simvastatin Palpitations     Muscle weakness, leg cramping         Medications:  Prior to Admission medications    Medication Sig Start Date End Date Taking? Authorizing Provider   omeprazole (PRILOSEC) 40 MG capsule TAKE ONE CAPSULE BY " "MOUTH ONCE DAILY 11/6/17  Yes Aishwarya Mir MD   hydrALAZINE (APRESOLINE) 100 MG TABS tablet Take 1 tablet (100 mg) by mouth 3 times daily 10/16/17  Yes Pedro Gomez MD   pravastatin (PRAVACHOL) 80 MG tablet TAKE ONE TABLET BY MOUTH ONCE DAILY 9/26/17  Yes Aishwarya Mir MD   allopurinol (ZYLOPRIM) 100 MG tablet Take 1 tablet (100 mg) by mouth daily 5/25/17  Yes Pedro Gomez MD   furosemide (LASIX) 40 MG tablet Take 1 tablet (40 mg) by mouth daily 5/24/17  Yes Marzena Martin MD   NITROSTAT 0.4 MG sublingual tablet DISSOLVE ONE TABLET UNDER THE TONGUE EVERY 5 MINUTES AS NEEDED FOR CHEST PAIN.  DO NOT EXCEED A TOTAL OF 3 DOSES IN 15 MINUTES. CALL 911. 4/10/17  Yes Aishwarya Mir MD   isosorbide mononitrate (ISMO/MONOKET) 20 MG tablet Take 2 tablets (40 mg) by mouth 3 times daily 3/30/17  Yes Aishwarya Mir MD   potassium chloride (K-TAB,KLOR-CON) 10 MEQ tablet Take 1 tablet (10 mEq) by mouth 2 times daily 3/29/17  Yes Marzena Martin MD   clopidogrel (PLAVIX) 75 MG tablet Take 1 tablet (75 mg) by mouth daily 3/21/17  Yes Pedro Gomez MD   order for DME Equipment being ordered: Left hand and wrist brace 1/6/17  Yes Engelmann, Lauren Anneliese, MD   itraconazole (SPORANOX) 100 MG capsule Take 2 capsules (200 mg) by mouth daily 12/15/16  Yes Pedro Gomez MD   metoprolol (LOPRESSOR) 100 MG tablet TAKE ONE TABLET BY MOUTH TWICE DAILY 12/13/16  Yes Aishwarya Mir MD   timolol (TIMOPTIC) 0.5 % ophthalmic solution  11/23/15  Yes Reported, Patient   multivitamin  with lutein (OCUVITE WITH LTEIN) CAPS Take 1 capsule by mouth daily   Yes Unknown, Entered By History   aspirin 81 MG tablet Take 1 tablet by mouth daily.   Yes Reported, Patient   cyanocolbalamin (VITAMIN B-12) 1000 MCG tablet Take 1 tablet by mouth daily. As directed   Yes Reported, Patient       Vitals:  /73  Pulse 62  Temp 97.8  F (36.6  C) (Oral)  Ht 1.651 m (5' 5\")  " Wt 67.9 kg (149 lb 12.8 oz)  SpO2 98%  BMI 24.93 kg/m2    Exam:  GENERAL APPEARANCE: alert and no distress  HENT: mouth without ulcers or lesions  LYMPHATICS: no cervical or supraclavicular nodes  RESP: lungs clear to auscultation - no rales, rhonchi or wheezes  CV: regular rhythm, normal rate, no rub, no murmur  EDEMA: no LE edema bilaterally  ABDOMEN: soft, nondistended, nontender, bowel sounds normal  MS: extremities normal - no gross deformities noted, no evidence of inflammation in joints, no muscle tenderness  SKIN: no rash    Results:  Last Basic Metabolic Panel:  Lab Results   Component Value Date     11/08/2017      Lab Results   Component Value Date    POTASSIUM 3.5 11/08/2017     Lab Results   Component Value Date    CHLORIDE 104 11/08/2017     Lab Results   Component Value Date    ALEXANDRO 8.8 11/08/2017     Lab Results   Component Value Date    CO2 26 11/08/2017     Lab Results   Component Value Date    BUN 54 11/08/2017     Lab Results   Component Value Date    CR 2.36 11/08/2017     Lab Results   Component Value Date    GLC 88 11/08/2017       Marzena Martin MD

## 2017-11-08 NOTE — NURSING NOTE
"Chief Complaint   Patient presents with     RECHECK     1 YEAR CKD FOLLOW UP       Initial /73  Pulse 62  Temp 97.8  F (36.6  C) (Oral)  Ht 1.651 m (5' 5\")  Wt 67.9 kg (149 lb 12.8 oz)  SpO2 98%  BMI 24.93 kg/m2 Estimated body mass index is 24.93 kg/(m^2) as calculated from the following:    Height as of this encounter: 1.651 m (5' 5\").    Weight as of this encounter: 67.9 kg (149 lb 12.8 oz).  Medication Reconciliation: complete   TERRIE FOREMAN CMA      "

## 2017-11-14 ENCOUNTER — PRE VISIT (OUTPATIENT)
Dept: CARDIOLOGY | Facility: CLINIC | Age: 82
End: 2017-11-14

## 2017-11-14 NOTE — TELEPHONE ENCOUNTER
Echo:  7/22/2013  Interpretation Summary  1. Normal LV size and systolic function 2. Normal RV size and function 3.   No significant valvular abnormalities 4. RVSP 38 mmHg based on RAP 10 mmHg.      CCL: 10-   66 y/o lady with long standing hypertension, presented to the ER with 4 hours of chest pressure. EKG showed inferior ST elevation. Baseline creatinine 1.60    Coronary angiogram demonstrated a dominant RCA. The LM is normal, LAD has moderate mid disease . LCx has moderate disease mid segment. The RCA has a 90% mid lesion.    PCI: The RCA was crossed with a Balanace wire and balloon dilated with a 2.5 x 15mm cross sail balloon and stented with a 3.5 x 18mm Bx velocity stent with a good result.    Recommendations:    1)Plavix 75mg po dq x 4 weeks    2)Lipitor 80mg po qd    3)ASA    4) Lopressor 12.5 mg po bid    5)ACE -I prior to d/c if Creatinine stable.      CCL: 2-5-2003   Ms Mansfield is a 67 yr old female who has significant history for CAD with Acute MI and PCI of the RCA in 10/02. She had been having back pain for the last few weeks.    Coronary angiogram revealed normal LMCA and right dominant system. LAD had very minimal luminal irregularities. Medial Cx had a 90% stenosis. RCA revealed wide-open stenet in the mid segment.    Medial Cx lesion was pre-dilated with 2.5mm X 15 mm balloon and a 2.5mm X 13mm stent was deployed in the medial Cx. Patient developed very slow flow down the stent following stent deployment. Due to distal embolization she persistently had BREANNA-1 flow down the stented segment.    IABP was placed to enhance the coronary perfusion.    Impression and Plan:    1.No reflow follwing stenting of the Medial Cx    2. IABP in place    3. Heparin infusion    4. Nitro IV    5. Plavix 75 mg per day    6. Abciximab for next 12 hrs.    7. Admit pt to 4C/D for observation      CCL: 4-   69 yo female who had an acute inferior MI and underwent PTCA and stenting of the RCA in 10/02.  Developed unstable angina in 2/03. The RCA stent was widely patent. She had a new lesion in a non-dominant Cx that was angioplastied and stented. It was complicated by slow flow and needed a balloon pump for a day. Presents with crescendo angina. Coronary angiography reveals the RCA and Cx stents are widely patent. She has a new 95% stenosis in the mid LAD. This was angioplastied and stented with a 3.0x 13 mm Hepacoat Velocity stent with an excellent result. The RFA was Angiosealed. The sheath in the RFV will be pulled when the ACT is < 180 seconds. Recommend long term PLAVIX. There were no complications.      CCL: 11-   71 yo female with history of CAD and stenting of the RCA, LAD, LCx in the past, returns for chest pain.    LM had a 30% stenosis. The LAD had a proximal segment with an ulcerated plaque and 60% stenosis. The LCx gave a large OM branch that was patent. The distal LCx had an 80% lesion. However, this was a small vessel. The RCA had an 80% distal lesion prior the bifurcation of the PDA + VERNELL.    IVUS of the LM showed diffuse severe calcification of the LMCA and Prox LAD and mild to moderate ISR of mid LAD stent. There were also areas of ulceration seen in the prox LAD.    The prox LAD was successfully stented with SES x2 (see PTCA section for details)    Recc:    ASA indefinitely    Plavix for 1 yr minimum    Risk factor management    Return for elective PCI of RCA in 1-2 weeks      PPM Implant: 11-    DISCUSSION: Tessa Mansfield is a 72-year-old woman with an episode of complete heart block with 2 profound asystolic episodes. Because she also has a few episodes of presyncope, a resting sinus bradycardia and a need for beta-blocker therapy, she had placement of a dual-chamber permanent pacemaker. This was performed without incident.    EQUIPMENT USED:    1. Right ventricular pacing lead, Medtronic model #5076-52, serial #[id number].    2. Right atrial pacing lead, Medtronic model #5076-45,  serial #[id number].    3. Pacemaker generator Medtronic model #8DDRL1, serial #[id number].    FINAL PROGRAMMING: Pacing mode is AAI with the ability to switch to DDD if AV block is detected. The lower rate is 60 pulses per minute and the upper tracking and upper activity rate is 130 pulses per minute.      CCL: 12-    71 yo female with history of recent PCI of ostial LAD and distal RCA disease, here for return PCI of the RCA.    The LHC done via RFA. 6Fr Sheath was used. A JR4 guide was used. An allstar wire was used to cross the lesion into the PDA. The lesion was directly stented with a 3.5 x 13mm cypher stent at 20 bars. The stent was post dilated with a 4.0 x 8mm powersail balloon.    The patient tolerated the procedure well.    IMP:    1) PCI of the RCA with a cypher stent.    2) Plavix lifelong.    3) Elective cath in Three months to evaluate the distal LMCA and ostial LAD      CCL: 6-    72 yo female with CKD, s/p PCI and stenting in the past has her usual angina and hence undergoes coronary evaluation. The LM and proximal LD shows moderate disease. The stents in the LAD are widely patent. The stent in the medial Cx has severe instent stenosis, however the area of supply is small. RCA is widely patent. In view of the small area of supply and CKD, we will attempt to optimize medical therapy at this time. The procedure was uncomplicated.      CCL: 11-10-09: LINDA    Mrs Mansfield was referred for angiography after developing atypical chest discomfort following stenting of the LAD and circumflex sometime ago. An angiogram last year showed patent stents, but the circumflex stent had 70% in stent restenosis. The distal vessel was small (as was the stent) and it was elected not to treat the restenosis. After that, she did lupillo well until recently.    Angiography was unchanged. An FFR in the LAD was normal (0.90).    Imp: 1. Mild CAD with signifiant stenosis only in the smal Cx branch. I elected not to  intervene for the reasons ellaborated by Dr. Mir last year.     CCL:  7/23/2013  Mrs. Mansfield presents on an semi-urgent  inpatient basis for coronary angiogram w/ unstable  angina with mildly positive troponin, back and chest pain relieved by a nitroglycerin drip.      Standard 4F diagnostic sheath and catheters employed via RFA. JLR4 and JR4 cathters were used.     Summary of Findings:  LMT with 40% distal disease.     LAD: the proximal LAD stents were patent and gave rise to multiple septal perforators. The MID LAD stent had 30% ISR from which the distal LAD proceeded to the apex and had 50% distal diffuse narrowing. The D2 also had diffuse disease.  The distal LAD and septal perforators retrogradely filled the mid and distal PDA.  LCX gave rise to a large proximal OM1 with multiple side branches. The AV groove Cx stent had 80% ISR but did supply a small territory.          RCA (dominant) has muiltiple 20-30% skip lesions throughout the mid RCA.  The PL branches have diffuse disease and there is a 100% occlusion of the PDA.     Impression:  1. Diffuse coronary artery disease as described above  2. 100% occlusion of MID PDA  3. S/P PCI of mid PDA     Plan  1.Dual anti-platelet therapy for 6 months  2. Asa indefinitely.

## 2017-11-16 ENCOUNTER — ALLIED HEALTH/NURSE VISIT (OUTPATIENT)
Dept: CARDIOLOGY | Facility: CLINIC | Age: 82
End: 2017-11-16
Attending: INTERNAL MEDICINE
Payer: COMMERCIAL

## 2017-11-16 VITALS
SYSTOLIC BLOOD PRESSURE: 137 MMHG | WEIGHT: 151.9 LBS | OXYGEN SATURATION: 99 % | DIASTOLIC BLOOD PRESSURE: 69 MMHG | BODY MASS INDEX: 23.84 KG/M2 | HEART RATE: 68 BPM | HEIGHT: 67 IN

## 2017-11-16 DIAGNOSIS — I25.10 CORONARY ARTERY DISEASE INVOLVING NATIVE CORONARY ARTERY OF NATIVE HEART WITHOUT ANGINA PECTORIS: Primary | ICD-10-CM

## 2017-11-16 DIAGNOSIS — I49.5 SINUS NODE DYSFUNCTION (H): Primary | ICD-10-CM

## 2017-11-16 PROCEDURE — 93280 PM DEVICE PROGR EVAL DUAL: CPT | Mod: ZF

## 2017-11-16 PROCEDURE — 93280 PM DEVICE PROGR EVAL DUAL: CPT | Mod: 26 | Performed by: INTERNAL MEDICINE

## 2017-11-16 PROCEDURE — 99214 OFFICE O/P EST MOD 30 MIN: CPT | Mod: 25 | Performed by: INTERNAL MEDICINE

## 2017-11-16 PROCEDURE — 99213 OFFICE O/P EST LOW 20 MIN: CPT | Mod: 25,ZF

## 2017-11-16 ASSESSMENT — PAIN SCALES - GENERAL: PAINLEVEL: NO PAIN (0)

## 2017-11-16 NOTE — LETTER
11/16/2017      RE: Tessa Mansfield  [address]  [facility] [address]       Dear Colleague,    Thank you for the opportunity to participate in the care of your patient, Tessa Mansfield, at the [facility] at [facility]. Please see a copy of my visit note below.    HPI:     Ms. Mansfield is a 83 yo female who has had a number of coronary interventions in the past, since 2002. Please review the procedures listed below. The most recent was in 7/13/13 for an occluded RPDA.  She has done well since then with no recurrence of chest pains. No dyspnea, palpitations or syncope.  Tolerating the Plavix. Feels well. Does not exercise very much due to polyneuropathy and balance issues.    PAST MEDICAL HISTORY:  Past Medical History:   Diagnosis Date     CAD (coronary artery disease)      Cardiac Pacemaker- Medtronic, dual chamber- NOT dependant 11/28/2007     CKD (chronic kidney disease), stage IV (H)      Hyperlipidemia LDL goal <70      Hypertension      Stented coronary artery 10-    RCA     Stented coronary artery 2-5-2003    LCx     Stented coronary artery 4-    LAD     Stented coronary artery 11-    LAD     Stented coronary artery 12-    RCA     Transient complete heart block (H) 11/28/2007       CURRENT MEDICATIONS:  Current Outpatient Prescriptions   Medication Sig Dispense Refill     potassium chloride (K-TAB,KLOR-CON) 10 MEQ tablet Take 2 tabs in AM, 1 tab in  tablet 3     omeprazole (PRILOSEC) 40 MG capsule TAKE ONE CAPSULE BY MOUTH ONCE DAILY 90 capsule 3     hydrALAZINE (APRESOLINE) 100 MG TABS tablet Take 1 tablet (100 mg) by mouth 3 times daily 270 tablet 3     pravastatin (PRAVACHOL) 80 MG tablet TAKE ONE TABLET BY MOUTH ONCE DAILY 90 tablet 3     allopurinol (ZYLOPRIM) 100 MG tablet Take 1 tablet (100 mg) by mouth daily 90 tablet 1     furosemide (LASIX) 40 MG tablet Take 1 tablet (40 mg) by mouth daily 90 tablet 3      "NITROSTAT 0.4 MG sublingual tablet DISSOLVE ONE TABLET UNDER THE TONGUE EVERY 5 MINUTES AS NEEDED FOR CHEST PAIN.  DO NOT EXCEED A TOTAL OF 3 DOSES IN 15 MINUTES. CALL 911. 25 tablet 3     isosorbide mononitrate (ISMO/MONOKET) 20 MG tablet Take 2 tablets (40 mg) by mouth 3 times daily 540 tablet 3     clopidogrel (PLAVIX) 75 MG tablet Take 1 tablet (75 mg) by mouth daily 90 tablet 2     order for DME Equipment being ordered: Left hand and wrist brace 1 each 0     metoprolol (LOPRESSOR) 100 MG tablet TAKE ONE TABLET BY MOUTH TWICE DAILY 180 tablet 3     timolol (TIMOPTIC) 0.5 % ophthalmic solution Place 1 drop into both eyes daily        multivitamin  with lutein (OCUVITE WITH LTEIN) CAPS Take 1 capsule by mouth daily       aspirin 81 MG tablet Take 1 tablet by mouth daily.       cyanocolbalamin (VITAMIN B-12) 1000 MCG tablet Take 1 tablet by mouth daily. As directed       itraconazole (SPORANOX) 100 MG capsule Take 2 capsules (200 mg) by mouth daily (Patient not taking: Reported on 11/16/2017) 180 capsule 0       PAST SURGICAL HISTORY:  Past Surgical History:   Procedure Laterality Date     CHOLECYSTECTOMY       HC PPM INSERTION NEW/REPLACEMENT W/ ATRIAL&VENTRICULAR LEAD  11-     HYSTERECTOMY         ALLERGIES     Allergies   Allergen Reactions     Bactrim [Sulfamethoxazole W/Trimethoprim] Other (See Comments)     Patient unable to recall     Biaxin [Clarithromycin]      Chlorthalidone Nausea and Vomiting     Clonidine      Codeine Sulfate Itching     Darvon [Propoxyphene Hcl]      Gabapentin Other (See Comments) and Fatigue     \"felt drunk\"     Indocin      Levaquin [Levofloxacin Hemihydrate]      Lyrica Fatigue     Morphine Sulfate      Percocet [Oxycodone-Acetaminophen]      Spironolactone Other (See Comments)     Dehydrated       Terazosin      Ciprofloxacin Rash     Simvastatin Palpitations     Muscle weakness, leg cramping         FAMILY HISTORY:  Family History   Problem Relation Age of Onset     " "CANCER Sister 82     bladder cancer       SOCIAL HISTORY:  History     Social History     Marital Status:      Spouse Name: N/A     Number of Children: 4     Years of Education: N/A     Occupational History      [employer]     Social History Main Topics     Smoking status: Former Smoker -- 0.30 packs/day for 15 years     Types: Cigarettes     Smokeless tobacco: Never Used      Comment: Quit 22+ years ago     Alcohol Use: None     Drug Use: No     Sexual Activity:     Partners: Female     Birth Control/ Protection: Post-menopausal     Other Topics Concern     Blood Transfusions Yes     Exercise No     Social History Narrative       ROS:   Constitutional: No fever, chills, or sweats. No weight gain/loss   ENT: No visual disturbance, ear ache, epistaxis, sore throat  Allergies/Immunologic: Negative.   Respiratory: No cough, hemoptysia  Cardiovascular: As per HPI  GI: No nausea, vomiting, hematemesis, melena, or hematochezia  : No urinary frequency, dysuria, or hematuria  Integument: Negative  Psychiatric: Negative  Neuro: Negative  Endocrinology: Negative   Musculoskeletal: Negative    EXAM:  /69 (BP Location: Left arm, Patient Position: Chair, Cuff Size: Adult Regular)  Pulse 68  Ht 1.702 m (5' 7\")  Wt 68.9 kg (151 lb 14.4 oz)  SpO2 99%  BMI 23.79 kg/m2  In general, the patient is a pleasant female in no apparent distress.    HEENT:Sclerae white, not injected.  Nares clear.  Pharynx without erythema or exudate.  Dentition intact.    Neck: No adenopathy.  No thyromegaly. Carotids +4/4 bilaterally without bruits.  No jugular venous distension.   Heart: RRR. Normal S1, S2 splits physiologically. Soft ESM. The PMI is in the 5th ICS in the midclavicular line. There is no heave.    Lungs: CTA.  No ronchi, wheezes, rales.  No dullness to percussion.   Abdomen: Soft, nontender, nondistended. No organomegaly.  No bruits.   Extremities: No clubbing, cyanosis, or edema.  The pulses are +4/4 at " the radial, brachial, femoral, popliteal, DP, and PT sites bilaterally.  No bruits are noted.  Neurologic: Alert and oriented to person/place/time, normal speech, gait and affect  Skin: No petechiae, purpura or rash.    Labs:  LIPID RESULTS:  Lab Results   Component Value Date    CHOL 119 11/08/2017    HDL 53 11/08/2017    LDL 32 11/08/2017    TRIG 170 (H) 11/08/2017    CHOLHDLRATIO 2.8 12/30/2014       LIVER ENZYME RESULTS:  Lab Results   Component Value Date    AST 14 11/08/2017    ALT 15 11/08/2017       CBC RESULTS:  Lab Results   Component Value Date    WBC 10.6 11/08/2017    RBC 3.62 (L) 11/08/2017    HGB 10.6 (L) 11/08/2017    HCT 36.3 11/08/2017     11/08/2017    MCH 29.3 11/08/2017    MCHC 29.2 (L) 11/08/2017    RDW 14.6 11/08/2017     11/08/2017       BMP RESULTS:  Lab Results   Component Value Date     11/08/2017    POTASSIUM 3.5 11/08/2017    CHLORIDE 104 11/08/2017    CO2 26 11/08/2017    ANIONGAP 9 11/08/2017    GLC 88 11/08/2017    BUN 54 (H) 11/08/2017    CR 2.36 (H) 11/08/2017    GFRESTIMATED 20 (L) 11/08/2017    GFRESTBLACK 24 (L) 11/08/2017    ALEXANDRO 8.8 11/08/2017        A1C RESULTS:  No results found for: A1C    INR RESULTS:  Lab Results   Component Value Date    INR 1.08 12/27/2013    INR 1.15 (H) 08/06/2010         Procedures:    Echo:  7/22/2013  Interpretation Summary  1. Normal LV size and systolic function 2. Normal RV size and function 3.   No significant valvular abnormalities 4. RVSP 38 mmHg based on RAP 10 mmHg.      CCL: 10-   68 y/o lady with long standing hypertension, presented to the ER with 4 hours of chest pressure. EKG showed inferior ST elevation. Baseline creatinine 1.60   Coronary angiogram demonstrated a dominant RCA. The LM is normal, LAD has moderate mid disease . LCx has moderate disease mid segment. The RCA has a 90% mid lesion.   PCI: The RCA was crossed with a Balanace wire and balloon dilated with a 2.5 x 15mm cross sail balloon and stented  with a 3.5 x 18mm Bx velocity stent with a good result.   Recommendations:   1)Plavix 75mg po dq x 4 weeks   2)Lipitor 80mg po qd   3)ASA   4) Lopressor 12.5 mg po bid   5)ACE -I prior to d/c if Creatinine stable.     CCL: 2-5-2003   Ms Mansfield is a 67 yr old female who has significant history for CAD with Acute MI and PCI of the RCA in 10/02. She had been having back pain for the last few weeks.   Coronary angiogram revealed normal LMCA and right dominant system. LAD had very minimal luminal irregularities. Medial Cx had a 90% stenosis. RCA revealed wide-open stenet in the mid segment.   Medial Cx lesion was pre-dilated with 2.5mm X 15 mm balloon and a 2.5mm X 13mm stent was deployed in the medial Cx. Patient developed very slow flow down the stent following stent deployment. Due to distal embolization she persistently had BREANNA-1 flow down the stented segment.   IABP was placed to enhance the coronary perfusion.   Impression and Plan:   1.No reflow follwing stenting of the Medial Cx   2. IABP in place   3. Heparin infusion   4. Nitro IV   5. Plavix 75 mg per day   6. Abciximab for next 12 hrs.   7. Admit pt to 4C/D for observation     CCL: 4-   69 yo female who had an acute inferior MI and underwent PTCA and stenting of the RCA in 10/02. Developed unstable angina in 2/03. The RCA stent was widely patent. She had a new lesion in a non-dominant Cx that was angioplastied and stented. It was complicated by slow flow and needed a balloon pump for a day. Presents with crescendo angina. Coronary angiography reveals the RCA and Cx stents are widely patent. She has a new 95% stenosis in the mid LAD. This was angioplastied and stented with a 3.0x 13 mm Hepacoat Velocity stent with an excellent result. The RFA was Angiosealed. The sheath in the RFV will be pulled when the ACT is < 180 seconds. Recommend long term PLAVIX. There were no complications.     CCL: 11-   71 yo female with history of CAD and stenting of  the RCA, LAD, LCx in the past, returns for chest pain.   LM had a 30% stenosis. The LAD had a proximal segment with an ulcerated plaque and 60% stenosis. The LCx gave a large OM branch that was patent. The distal LCx had an 80% lesion. However, this was a small vessel. The RCA had an 80% distal lesion prior the bifurcation of the PDA + VERNELL.   IVUS of the LM showed diffuse severe calcification of the LMCA and Prox LAD and mild to moderate ISR of mid LAD stent. There were also areas of ulceration seen in the prox LAD.   The prox LAD was successfully stented with SES x2 (see PTCA section for details)   Recc:   ASA indefinitely   Plavix for 1 yr minimum   Risk factor management   Return for elective PCI of RCA in 1-2 weeks     PPM Implant: 11-   DISCUSSION: Tessa Mansfield is a 72-year-old woman with an episode of complete heart block with 2 profound asystolic episodes. Because she also has a few episodes of presyncope, a resting sinus bradycardia and a need for beta-blocker therapy, she had placement of a dual-chamber permanent pacemaker. This was performed without incident.   EQUIPMENT USED:   1. Right ventricular pacing lead, Medtronic model #5076-52, serial #[id number].   2. Right atrial pacing lead, Medtronic model #5076-45, serial #[id number].   3. Pacemaker generator Medtronic model #8DDRL1, serial #[id number].   FINAL PROGRAMMING: Pacing mode is AAI with the ability to switch to DDD if AV block is detected. The lower rate is 60 pulses per minute and the upper tracking and upper activity rate is 130 pulses per minute.     CCL: 12-   71 yo female with history of recent PCI of ostial LAD and distal RCA disease, here for return PCI of the RCA.   The LHC done via RFA. 6Fr Sheath was used. A JR4 guide was used. An allstar wire was used to cross the lesion into the PDA. The lesion was directly stented with a 3.5 x 13mm cypher stent at 20 bars. The stent was post dilated with a 4.0 x 8mm powersail balloon.    The patient tolerated the procedure well.   IMP:   1) PCI of the RCA with a cypher stent.   2) Plavix lifelong.   3) Elective cath in Three months to evaluate the distal LMCA and ostial LAD     CCL: 6-   74 yo female with CKD, s/p PCI and stenting in the past has her usual angina and hence undergoes coronary evaluation. The LM and proximal LD shows moderate disease. The stents in the LAD are widely patent. The stent in the medial Cx has severe instent stenosis, however the area of supply is small. RCA is widely patent. In view of the small area of supply and CKD, we will attempt to optimize medical therapy at this time. The procedure was uncomplicated.     CCL: 11-10-09: LINDA   Mrs Mansfield was referred for angiography after developing atypical chest discomfort following stenting of the LAD and circumflex sometime ago. An angiogram last year showed patent stents, but the circumflex stent had 70% in stent restenosis. The distal vessel was small (as was the stent) and it was elected not to treat the restenosis. After that, she did lupillo well until recently.   Angiography was unchanged. An FFR in the LAD was normal (0.90).   Imp: 1. Mild CAD with signifiant stenosis only in the smal Cx branch. I elected not to intervene for the reasons ellaborated by Dr. Mir last year.    CCL:  7/23/2013  Mrs. Mansfield presents on an semi-urgent  inpatient basis for coronary angiogram w/ unstable  angina with mildly positive troponin, back and chest pain relieved by a nitroglycerin drip.     Standard 4F diagnostic sheath and catheters employed via RFA. JLR4 and JR4 cathters were used.    Summary of Findings:  LMT with 40% distal disease.    LAD: the proximal LAD stents were patent and gave rise to multiple septal perforators. The MID LAD stent had 30% ISR from which the distal LAD proceeded to the apex and had 50% distal diffuse narrowing. The D2 also had diffuse disease.  The distal LAD and septal perforators retrogradely filled  the mid and distal PDA.  LCX gave rise to a large proximal OM1 with multiple side branches. The AV groove Cx stent had 80% ISR but did supply a small territory.        RCA (dominant) has muiltiple 20-30% skip lesions throughout the mid RCA.  The PL branches have diffuse disease and there is a 100% occlusion of the PDA.    Impression:  1. Diffuse coronary artery disease as described above  2. 100% occlusion of MID PDA  3. S/P PCI of mid PDA    Plan  1.Dual anti-platelet therapy for 6 months  2. Asa indefinitely.     Assessment and Plan:   Ms Mansfield returns for her annual review. She is doing well since her last PCI in 7/13/13. No recurrence of Sx. Labs and medications reviewed and are appropriate. Recommend long term Plavix. Continue rest of medications.    Her pacemaker shows an Afib burden of < 0.1%. This is small and possibly also due to oversensing. She has a h/o bleeding in the past on VKA. Her HAS-BLED score is 8.9%. Hence I do not feel she needs to be on chronic anticoagulation for this.    Review in a year. Plans discussed with the patient who is in agreement.    Sincerely,     Aishwarya Mir MD    CC  Patient Care Team:  Pedro Gomez MD as PCP - General (Family Practice)  Aishwarya Mir MD as MD (Internal Medicine)  Adriana Lilly MD as MD (Urology)  ESTABLISHED PATIENT

## 2017-11-16 NOTE — NURSING NOTE
Med Reconcile: Reviewed and verified all current medications with the patient. The updated medication list was printed and given to the patient.  Return Appointment: Follow up in one year. Patient given instructions regarding scheduling next clinic visit. Patient demonstrated understanding of this information and agreed to call with further questions or concerns.  Patient stated she understood all health information given and agreed to call with further questions or concerns.

## 2017-11-16 NOTE — MR AVS SNAPSHOT
After Visit Summary   11/16/2017    Tessa Mansfield    MRN: [id number]           Patient Information     Date Of Birth          1935        Visit Information        Provider Department      11/16/2017 3:00 PM 1, Uc Cv Device [facility]        Today's Diagnoses     Sinus node dysfunction (H)    -  1      Care Instructions    It was a pleasure to see you in clinic today.  Please do not hesitate to call with any questions or concerns.  You are scheduled for a remote transmission on 2/19/18.  We look forward to seeing you in clinic at your next device check in 6 months.    Beatriz Gomez, RN, MS, CCRN  Electrophysiology Nurse Clinician  [facility]    During Business Hours Please Call:  [phone]  After Hours Please Call:  [phone] - select option #4 and ask for job code [phone]                        Follow-ups after your visit        Follow-up notes from your care team     Return in about 6 months (around 5/16/2018) for Pacemaker Check.      Your next 10 appointments already scheduled     May 16, 2018  2:00 PM CDT   (Arrive by 1:45 PM)   Pacemaker Check with Uc Cv Device 1   [facility] ([facility])    [address] [place] [id number] [phone]            Aug 08, 2018  9:05 AM CDT   (Arrive by 8:35 AM)   Return Visit with Marzena Martin MD   [facility] ([facility])    [address] [place] [id number] [phone]              Who to contact     If you have questions or need follow up information about today's clinic visit or your schedule please contact [facility] directly at [phone].  Normal or non-critical lab and imaging results will be communicated to you by MyChart, letter or phone within 4 business days after the clinic has received the results. If you do not hear from us within 7 days, please contact the clinic  "through [employer] or phone. If you have a critical or abnormal lab result, we will notify you by phone as soon as possible.  Submit refill requests through [employer] or call your pharmacy and they will forward the refill request to us. Please allow 3 business days for your refill to be completed.          Additional Information About Your Visit        [email or web address]!harGlucoVista Information     Fileforce lets you send messages to your doctor, view your test results, renew your prescriptions, schedule appointments and more. To sign up, go to [email or web address] . Click on \"Log in\" on the left side of the screen, which will take you to the Welcome page. Then click on \"Sign up Now\" on the right side of the page.     You will be asked to enter the access code listed below, as well as some personal information. Please follow the directions to create your username and password.     Your access code is: [email or web address]  Expires: 2017  7:50 AM     Your access code will  in 90 days. If you need help or a new code, please call your [place] clinic or [phone].        Care EveryWhere ID     This is your Care EveryWhere ID. This could be used by other organizations to access your [place] medical records  [id number]         Blood Pressure from Last 3 Encounters:   17 137/69   17 134/73   10/11/17 138/76    Weight from Last 3 Encounters:   17 68.9 kg (151 lb 14.4 oz)   17 67.9 kg (149 lb 12.8 oz)   10/11/17 68.5 kg (151 lb)              We Performed the Following     PM DEVICE PROGRAMMING EVAL, DUAL LEAD PACER        Primary Care Provider Office Phone # Fax #    Pedro Gomez [phone]       [address] [address]        Equal Access to Services     FAYE GUILLORY : Lis Gross, rohith smith, [place] shelby. [facility] [phone].    ATENCIÓN: Si chika merlyn, tiene a braga disposición servicios " jaron de asistencia lingüística. Thomas moore [phone].    We comply with applicable federal civil rights laws and [place] laws. We do not discriminate on the basis of race, color, national origin, age, disability, sex, sexual orientation, or gender identity.            Thank you!     Thank you for choosing [facility]  for your care. Our goal is always to provide you with excellent care. Hearing back from our patients is one way we can continue to improve our services. Please take a few minutes to complete the written survey that you may receive in the mail after your visit with us. Thank you!             Your Updated Medication List - Protect others around you: Learn how to safely use, store and throw away your medicines at www.disposemymeds.org.          This list is accurate as of: 11/16/17  4:19 PM.  Always use your most recent med list.                   Brand Name Dispense Instructions for use Diagnosis    allopurinol 100 MG tablet    ZYLOPRIM    90 tablet    Take 1 tablet (100 mg) by mouth daily    Gout       aspirin 81 MG tablet      Take 1 tablet by mouth daily.        clopidogrel 75 MG tablet    PLAVIX    90 tablet    Take 1 tablet (75 mg) by mouth daily    Coronary artery disease with other form of angina pectoris, NSTEMI (non-ST elevated myocardial infarction) (H), S/P angioplasty with stent       cyanocobalamin 1000 MCG tablet    vitamin  B-12     Take 1 tablet by mouth daily. As directed        furosemide 40 MG tablet    LASIX    90 tablet    Take 1 tablet (40 mg) by mouth daily    Essential hypertension       hydrALAZINE 100 MG Tabs tablet    APRESOLINE    270 tablet    Take 1 tablet (100 mg) by mouth 3 times daily    Essential hypertension       isosorbide mononitrate 20 MG tablet    ISMO/MONOKET    540 tablet    Take 2 tablets (40 mg) by mouth 3 times daily    Coronary artery disease with other form of angina pectoris       itraconazole 100 MG capsule    SPORANOX    180 capsule     Take 2 capsules (200 mg) by mouth daily    Dermatophytosis of nail       metoprolol 100 MG tablet    LOPRESSOR    180 tablet    TAKE ONE TABLET BY MOUTH TWICE DAILY    Essential hypertension       multivitamin  with lutein Caps per capsule      Take 1 capsule by mouth daily        NITROSTAT 0.4 MG sublingual tablet   Generic drug:  nitroGLYcerin     25 tablet    DISSOLVE ONE TABLET UNDER THE TONGUE EVERY 5 MINUTES AS NEEDED FOR CHEST PAIN.  DO NOT EXCEED A TOTAL OF 3 DOSES IN 15 MINUTES. CALL 911.    CAD (coronary artery disease)       omeprazole 40 MG capsule    priLOSEC    90 capsule    TAKE ONE CAPSULE BY MOUTH ONCE DAILY    Esophageal reflux       order for DME     1 each    Equipment being ordered: Left hand and wrist brace    Pain of left hand, Fall from standing, initial encounter       potassium chloride 10 MEQ tablet    K-TAB,KLOR-CON    270 tablet    Take 2 tabs in AM, 1 tab in PM    Hypokalemia       pravastatin 80 MG tablet    PRAVACHOL    90 tablet    TAKE ONE TABLET BY MOUTH ONCE DAILY    Hyperlipidemia LDL goal <130       timolol 0.5 % ophthalmic solution    TIMOPTIC     Place 1 drop into both eyes daily

## 2017-11-16 NOTE — PROGRESS NOTES
HPI:     Ms. Mansfield is a 83 yo female who has had a number of coronary interventions in the past, since 2002. Please review the procedures listed below. The most recent was in 7/13/13 for an occluded RPDA.  She has done well since then with no recurrence of chest pains. No dyspnea, palpitations or syncope.  Tolerating the Plavix. Feels well. Does not exercise very much due to polyneuropathy and balance issues.    PAST MEDICAL HISTORY:  Past Medical History:   Diagnosis Date     CAD (coronary artery disease)      Cardiac Pacemaker- Medtronic, dual chamber- NOT dependant 11/28/2007     CKD (chronic kidney disease), stage IV (H)      Hyperlipidemia LDL goal <70      Hypertension      Stented coronary artery 10-    RCA     Stented coronary artery 2-5-2003    LCx     Stented coronary artery 4-    LAD     Stented coronary artery 11-    LAD     Stented coronary artery 12-    RCA     Transient complete heart block (H) 11/28/2007       CURRENT MEDICATIONS:  Current Outpatient Prescriptions   Medication Sig Dispense Refill     potassium chloride (K-TAB,KLOR-CON) 10 MEQ tablet Take 2 tabs in AM, 1 tab in  tablet 3     omeprazole (PRILOSEC) 40 MG capsule TAKE ONE CAPSULE BY MOUTH ONCE DAILY 90 capsule 3     hydrALAZINE (APRESOLINE) 100 MG TABS tablet Take 1 tablet (100 mg) by mouth 3 times daily 270 tablet 3     pravastatin (PRAVACHOL) 80 MG tablet TAKE ONE TABLET BY MOUTH ONCE DAILY 90 tablet 3     allopurinol (ZYLOPRIM) 100 MG tablet Take 1 tablet (100 mg) by mouth daily 90 tablet 1     furosemide (LASIX) 40 MG tablet Take 1 tablet (40 mg) by mouth daily 90 tablet 3     NITROSTAT 0.4 MG sublingual tablet DISSOLVE ONE TABLET UNDER THE TONGUE EVERY 5 MINUTES AS NEEDED FOR CHEST PAIN.  DO NOT EXCEED A TOTAL OF 3 DOSES IN 15 MINUTES. CALL 911. 25 tablet 3     isosorbide mononitrate (ISMO/MONOKET) 20 MG tablet Take 2 tablets (40 mg) by mouth 3 times daily 540 tablet 3     clopidogrel (PLAVIX) 75  "MG tablet Take 1 tablet (75 mg) by mouth daily 90 tablet 2     order for DME Equipment being ordered: Left hand and wrist brace 1 each 0     metoprolol (LOPRESSOR) 100 MG tablet TAKE ONE TABLET BY MOUTH TWICE DAILY 180 tablet 3     timolol (TIMOPTIC) 0.5 % ophthalmic solution Place 1 drop into both eyes daily        multivitamin  with lutein (OCUVITE WITH LTEIN) CAPS Take 1 capsule by mouth daily       aspirin 81 MG tablet Take 1 tablet by mouth daily.       cyanocolbalamin (VITAMIN B-12) 1000 MCG tablet Take 1 tablet by mouth daily. As directed       itraconazole (SPORANOX) 100 MG capsule Take 2 capsules (200 mg) by mouth daily (Patient not taking: Reported on 11/16/2017) 180 capsule 0       PAST SURGICAL HISTORY:  Past Surgical History:   Procedure Laterality Date     CHOLECYSTECTOMY       HC PPM INSERTION NEW/REPLACEMENT W/ ATRIAL&VENTRICULAR LEAD  11-     HYSTERECTOMY         ALLERGIES     Allergies   Allergen Reactions     Bactrim [Sulfamethoxazole W/Trimethoprim] Other (See Comments)     Patient unable to recall     Biaxin [Clarithromycin]      Chlorthalidone Nausea and Vomiting     Clonidine      Codeine Sulfate Itching     Darvon [Propoxyphene Hcl]      Gabapentin Other (See Comments) and Fatigue     \"felt drunk\"     Indocin      Levaquin [Levofloxacin Hemihydrate]      Lyrica Fatigue     Morphine Sulfate      Percocet [Oxycodone-Acetaminophen]      Spironolactone Other (See Comments)     Dehydrated       Terazosin      Ciprofloxacin Rash     Simvastatin Palpitations     Muscle weakness, leg cramping         FAMILY HISTORY:  Family History   Problem Relation Age of Onset     CANCER Sister 82     bladder cancer       SOCIAL HISTORY:  History     Social History     Marital Status:      Spouse Name: N/A     Number of Children: 4     Years of Education: N/A     Occupational History      [employer]     Social History Main Topics     Smoking status: Former Smoker -- 0.30 packs/day for 15 " "years     Types: Cigarettes     Smokeless tobacco: Never Used      Comment: Quit 22+ years ago     Alcohol Use: None     Drug Use: No     Sexual Activity:     Partners: Female     Birth Control/ Protection: Post-menopausal     Other Topics Concern     Blood Transfusions Yes     Exercise No     Social History Narrative       ROS:   Constitutional: No fever, chills, or sweats. No weight gain/loss   ENT: No visual disturbance, ear ache, epistaxis, sore throat  Allergies/Immunologic: Negative.   Respiratory: No cough, hemoptysia  Cardiovascular: As per HPI  GI: No nausea, vomiting, hematemesis, melena, or hematochezia  : No urinary frequency, dysuria, or hematuria  Integument: Negative  Psychiatric: Negative  Neuro: Negative  Endocrinology: Negative   Musculoskeletal: Negative    EXAM:  /69 (BP Location: Left arm, Patient Position: Chair, Cuff Size: Adult Regular)  Pulse 68  Ht 1.702 m (5' 7\")  Wt 68.9 kg (151 lb 14.4 oz)  SpO2 99%  BMI 23.79 kg/m2  In general, the patient is a pleasant female in no apparent distress.    HEENT:Sclerae white, not injected.  Nares clear.  Pharynx without erythema or exudate.  Dentition intact.    Neck: No adenopathy.  No thyromegaly. Carotids +4/4 bilaterally without bruits.  No jugular venous distension.   Heart: RRR. Normal S1, S2 splits physiologically. Soft ESM. The PMI is in the 5th ICS in the midclavicular line. There is no heave.    Lungs: CTA.  No ronchi, wheezes, rales.  No dullness to percussion.   Abdomen: Soft, nontender, nondistended. No organomegaly.  No bruits.   Extremities: No clubbing, cyanosis, or edema.  The pulses are +4/4 at the radial, brachial, femoral, popliteal, DP, and PT sites bilaterally.  No bruits are noted.  Neurologic: Alert and oriented to person/place/time, normal speech, gait and affect  Skin: No petechiae, purpura or rash.    Labs:  LIPID RESULTS:  Lab Results   Component Value Date    CHOL 119 11/08/2017    HDL 53 11/08/2017    LDL 32 " 11/08/2017    TRIG 170 (H) 11/08/2017    CHOLHDLRATIO 2.8 12/30/2014       LIVER ENZYME RESULTS:  Lab Results   Component Value Date    AST 14 11/08/2017    ALT 15 11/08/2017       CBC RESULTS:  Lab Results   Component Value Date    WBC 10.6 11/08/2017    RBC 3.62 (L) 11/08/2017    HGB 10.6 (L) 11/08/2017    HCT 36.3 11/08/2017     11/08/2017    MCH 29.3 11/08/2017    MCHC 29.2 (L) 11/08/2017    RDW 14.6 11/08/2017     11/08/2017       BMP RESULTS:  Lab Results   Component Value Date     11/08/2017    POTASSIUM 3.5 11/08/2017    CHLORIDE 104 11/08/2017    CO2 26 11/08/2017    ANIONGAP 9 11/08/2017    GLC 88 11/08/2017    BUN 54 (H) 11/08/2017    CR 2.36 (H) 11/08/2017    GFRESTIMATED 20 (L) 11/08/2017    GFRESTBLACK 24 (L) 11/08/2017    ALEXANDRO 8.8 11/08/2017        A1C RESULTS:  No results found for: A1C    INR RESULTS:  Lab Results   Component Value Date    INR 1.08 12/27/2013    INR 1.15 (H) 08/06/2010         Procedures:    Echo:  7/22/2013  Interpretation Summary  1. Normal LV size and systolic function 2. Normal RV size and function 3.   No significant valvular abnormalities 4. RVSP 38 mmHg based on RAP 10 mmHg.      CCL: 10-   68 y/o lady with long standing hypertension, presented to the ER with 4 hours of chest pressure. EKG showed inferior ST elevation. Baseline creatinine 1.60   Coronary angiogram demonstrated a dominant RCA. The LM is normal, LAD has moderate mid disease . LCx has moderate disease mid segment. The RCA has a 90% mid lesion.   PCI: The RCA was crossed with a Balanace wire and balloon dilated with a 2.5 x 15mm cross sail balloon and stented with a 3.5 x 18mm Bx velocity stent with a good result.   Recommendations:   1)Plavix 75mg po dq x 4 weeks   2)Lipitor 80mg po qd   3)ASA   4) Lopressor 12.5 mg po bid   5)ACE -I prior to d/c if Creatinine stable.     CCL: 2-5-2003   Ms Mansfield is a 67 yr old female who has significant history for CAD with Acute MI and PCI of the RCA  in 10/02. She had been having back pain for the last few weeks.   Coronary angiogram revealed normal LMCA and right dominant system. LAD had very minimal luminal irregularities. Medial Cx had a 90% stenosis. RCA revealed wide-open stenet in the mid segment.   Medial Cx lesion was pre-dilated with 2.5mm X 15 mm balloon and a 2.5mm X 13mm stent was deployed in the medial Cx. Patient developed very slow flow down the stent following stent deployment. Due to distal embolization she persistently had BREANNA-1 flow down the stented segment.   IABP was placed to enhance the coronary perfusion.   Impression and Plan:   1.No reflow follwing stenting of the Medial Cx   2. IABP in place   3. Heparin infusion   4. Nitro IV   5. Plavix 75 mg per day   6. Abciximab for next 12 hrs.   7. Admit pt to 4C/D for observation     CCL: 4-   69 yo female who had an acute inferior MI and underwent PTCA and stenting of the RCA in 10/02. Developed unstable angina in 2/03. The RCA stent was widely patent. She had a new lesion in a non-dominant Cx that was angioplastied and stented. It was complicated by slow flow and needed a balloon pump for a day. Presents with crescendo angina. Coronary angiography reveals the RCA and Cx stents are widely patent. She has a new 95% stenosis in the mid LAD. This was angioplastied and stented with a 3.0x 13 mm Hepacoat Velocity stent with an excellent result. The RFA was Angiosealed. The sheath in the RFV will be pulled when the ACT is < 180 seconds. Recommend long term PLAVIX. There were no complications.     CCL: 11-   73 yo female with history of CAD and stenting of the RCA, LAD, LCx in the past, returns for chest pain.   LM had a 30% stenosis. The LAD had a proximal segment with an ulcerated plaque and 60% stenosis. The LCx gave a large OM branch that was patent. The distal LCx had an 80% lesion. However, this was a small vessel. The RCA had an 80% distal lesion prior the bifurcation of the PDA  + VERNELL.   IVUS of the LM showed diffuse severe calcification of the LMCA and Prox LAD and mild to moderate ISR of mid LAD stent. There were also areas of ulceration seen in the prox LAD.   The prox LAD was successfully stented with SES x2 (see PTCA section for details)   Recc:   ASA indefinitely   Plavix for 1 yr minimum   Risk factor management   Return for elective PCI of RCA in 1-2 weeks     PPM Implant: 11-   DISCUSSION: Tessa Mansfield is a 72-year-old woman with an episode of complete heart block with 2 profound asystolic episodes. Because she also has a few episodes of presyncope, a resting sinus bradycardia and a need for beta-blocker therapy, she had placement of a dual-chamber permanent pacemaker. This was performed without incident.   EQUIPMENT USED:   1. Right ventricular pacing lead, Medtronic model #5076-52, serial #[id number].   2. Right atrial pacing lead, Medtronic model #5076-45, serial #[id number].   3. Pacemaker generator Medtronic model #8DDRL1, serial #[id number].   FINAL PROGRAMMING: Pacing mode is AAI with the ability to switch to DDD if AV block is detected. The lower rate is 60 pulses per minute and the upper tracking and upper activity rate is 130 pulses per minute.     CCL: 12-   73 yo female with history of recent PCI of ostial LAD and distal RCA disease, here for return PCI of the RCA.   The LHC done via RFA. 6Fr Sheath was used. A JR4 guide was used. An allstar wire was used to cross the lesion into the PDA. The lesion was directly stented with a 3.5 x 13mm cypher stent at 20 bars. The stent was post dilated with a 4.0 x 8mm powersail balloon.   The patient tolerated the procedure well.   IMP:   1) PCI of the RCA with a cypher stent.   2) Plavix lifelong.   3) Elective cath in Three months to evaluate the distal LMCA and ostial LAD     CCL: 6-   72 yo female with CKD, s/p PCI and stenting in the past has her usual angina and hence undergoes coronary evaluation. The  LM and proximal LD shows moderate disease. The stents in the LAD are widely patent. The stent in the medial Cx has severe instent stenosis, however the area of supply is small. RCA is widely patent. In view of the small area of supply and CKD, we will attempt to optimize medical therapy at this time. The procedure was uncomplicated.     CCL: 11-10-09: LINDA   Mrs Mansfield was referred for angiography after developing atypical chest discomfort following stenting of the LAD and circumflex sometime ago. An angiogram last year showed patent stents, but the circumflex stent had 70% in stent restenosis. The distal vessel was small (as was the stent) and it was elected not to treat the restenosis. After that, she did lupillo well until recently.   Angiography was unchanged. An FFR in the LAD was normal (0.90).   Imp: 1. Mild CAD with signifiant stenosis only in the smal Cx branch. I elected not to intervene for the reasons ellaborated by Dr. Mir last year.    CCL:  7/23/2013  Mrs. Mansfield presents on an semi-urgent  inpatient basis for coronary angiogram w/ unstable  angina with mildly positive troponin, back and chest pain relieved by a nitroglycerin drip.     Standard 4F diagnostic sheath and catheters employed via RFA. JLR4 and JR4 cathters were used.    Summary of Findings:  LMT with 40% distal disease.    LAD: the proximal LAD stents were patent and gave rise to multiple septal perforators. The MID LAD stent had 30% ISR from which the distal LAD proceeded to the apex and had 50% distal diffuse narrowing. The D2 also had diffuse disease.  The distal LAD and septal perforators retrogradely filled the mid and distal PDA.  LCX gave rise to a large proximal OM1 with multiple side branches. The AV groove Cx stent had 80% ISR but did supply a small territory.        RCA (dominant) has muiltiple 20-30% skip lesions throughout the mid RCA.  The PL branches have diffuse disease and there is a 100% occlusion of the  PDA.    Impression:  1. Diffuse coronary artery disease as described above  2. 100% occlusion of MID PDA  3. S/P PCI of mid PDA    Plan  1.Dual anti-platelet therapy for 6 months  2. Asa indefinitely.     Assessment and Plan:     Ms Mansfield returns for her annual review. She is doing well since her last PCI in 7/13/13. No recurrence of Sx. Labs and medications reviewed and are appropriate. Recommend long term Plavix. Continue rest of medications.    Her pacemaker shows an Afib burden of < 0.1%. This is small and possibly also due to oversensing. She has a h/o bleeding in the past on VKA. Her HAS-BLED score is 8.9%. Hence I do not feel she needs to be on chronic anticoagulation for this.    Review in a year. Plans discussed with the patient who is in agreement.      CC  Patient Care Team:  Pedro Gomez MD as PCP - General (Family Practice)  Aishwarya Mir MD as MD (Internal Medicine)  Adriana Lilly MD as MD (Urology)  ESTABLISHED PATIENT

## 2017-11-16 NOTE — PATIENT INSTRUCTIONS
It was a pleasure to see you in clinic today.  Please do not hesitate to call with any questions or concerns.  You are scheduled for a remote transmission on 2/19/18.  We look forward to seeing you in clinic at your next device check in 6 months.    Beatriz Gomez, RN, MS, CCRN  Electrophysiology Nurse Clinician  [facility]    During Business Hours Please Call:  [phone]  After Hours Please Call:  [phone] - select option #4 and ask for job code [phone]

## 2017-11-16 NOTE — PATIENT INSTRUCTIONS
Thank you for your visit today.  Please call me with any questions or concerns.   Escobar Morel RN  Cardiology Care Coordinator  [phone], press option 1 then option 3

## 2017-11-16 NOTE — PROGRESS NOTES
Patient seen in clinic for evaluation and iterative programming of her Medtronic dual lead pacemaker per MD orders.  Patient is scheduled to see Dr. Mir today.  Normal pacemaker function.  221 mode switch episodes recorded - < 1 min - 4 hrs 8 min in duration.  AF burden = <0.1%.  Patient is taking Plavix and ASA daily.  Intrinsic rhythm = NSR @ 63 bpm.  AP = 82.8%.   = 0.1%.  Estimated battery longevity to LOLA = 30 months.  Patient reports that she is feeling well and denies specific complaints.  Plan for patient to send a remote transmission in 3 months and return to clinic in 6 months.  Dr. Mir notified of interrogation results.    Dual lead pacemaker

## 2017-11-16 NOTE — NURSING NOTE
Chief Complaint   Patient presents with     Follow Up For     CAD, S/P stentings     Vitals were taken and medications were reconciled.  Jared Dorman, CHIQUIS  3:10 PM

## 2017-11-16 NOTE — MR AVS SNAPSHOT
"              After Visit Summary   11/16/2017    Tessa Mansfield    MRN: [id number]           Patient Information     Date Of Birth          1935        Visit Information        Provider Department      11/16/2017 3:30 PM Aishwarya Mir MD [facility]        Care Instructions    Thank you for your visit today.  Please call me with any questions or concerns.   Escobar Morel RN  Cardiology Care Coordinator  [phone], press option 1 then option 3          Follow-ups after your visit        Follow-up notes from your care team     Return in about 1 year (around 11/16/2018) for CAD, Dr. Mir.      Your next 10 appointments already scheduled     Aug 08, 2018  9:05 AM CDT   (Arrive by 8:35 AM)   Return Visit with Marzena Martin MD   [facility] ([facility] [place])    [address] [place] [address] [phone]              Who to contact     If you have questions or need follow up information about today's clinic visit or your schedule please contact [facility] directly at [phone].  Normal or non-critical lab and imaging results will be communicated to you by [employer], letter or phone within 4 business days after the clinic has received the results. If you do not hear from us within 7 days, please contact the clinic through [employer] or phone. If you have a critical or abnormal lab result, we will notify you by phone as soon as possible.  Submit refill requests through Yeelion or call your pharmacy and they will forward the refill request to us. Please allow 3 business days for your refill to be completed.          Additional Information About Your Visit        [email or web address] Information     Yeelion lets you send messages to your doctor, view your test results, renew your prescriptions, schedule appointments and more. To sign up, go to [email or web address] . Click on \"Log in\" on the left side of the screen, which will take you " "to the Welcome page. Then click on \"Sign up Now\" on the right side of the page.     You will be asked to enter the access code listed below, as well as some personal information. Please follow the directions to create your username and password.     Your access code is: [email or web address]  Expires: 2017  7:50 AM     Your access code will  in 90 days. If you need help or a new code, please call your [place] clinic or [phone].        Care EveryWhere ID     This is your Care EveryWhere ID. This could be used by other organizations to access your [place] medical records  [id number]        Your Vitals Were     Pulse Height Pulse Oximetry BMI (Body Mass Index)          68 1.702 m (5' 7\") 99% 23.79 kg/m2         Blood Pressure from Last 3 Encounters:   17 137/69   17 134/73   10/11/17 138/76    Weight from Last 3 Encounters:   17 68.9 kg (151 lb 14.4 oz)   17 67.9 kg (149 lb 12.8 oz)   10/11/17 68.5 kg (151 lb)              Today, you had the following     No orders found for display       Primary Care Provider Office Phone # Fax #    Pedro Gomez [phone]       [address] [address]        Equal Access to Services     [facility]: Hadii aad ku hadasho Sofelecia, waaxda luqadaha, qaybta kaalmada adezacyada, [place] whitmore . So [facility] [phone].    ATENCIÓN: Si habla español, tiene a braga disposición servicios gratuitos de asistencia lingüística. Thomas al [phone].    We comply with applicable federal civil rights laws and [place] laws. We do not discriminate on the basis of race, color, national origin, age, disability, sex, sexual orientation, or gender identity.            Thank you!     Thank you for choosing [facility]  for your care. Our goal is always to provide you with excellent care. Hearing back from our patients is one way we can continue to improve our services. Please take a few " minutes to complete the written survey that you may receive in the mail after your visit with us. Thank you!             Your Updated Medication List - Protect others around you: Learn how to safely use, store and throw away your medicines at www.disposemymeds.org.          This list is accurate as of: 11/16/17  4:13 PM.  Always use your most recent med list.                   Brand Name Dispense Instructions for use Diagnosis    allopurinol 100 MG tablet    ZYLOPRIM    90 tablet    Take 1 tablet (100 mg) by mouth daily    Gout       aspirin 81 MG tablet      Take 1 tablet by mouth daily.        clopidogrel 75 MG tablet    PLAVIX    90 tablet    Take 1 tablet (75 mg) by mouth daily    Coronary artery disease with other form of angina pectoris, NSTEMI (non-ST elevated myocardial infarction) (H), S/P angioplasty with stent       cyanocobalamin 1000 MCG tablet    vitamin  B-12     Take 1 tablet by mouth daily. As directed        furosemide 40 MG tablet    LASIX    90 tablet    Take 1 tablet (40 mg) by mouth daily    Essential hypertension       hydrALAZINE 100 MG Tabs tablet    APRESOLINE    270 tablet    Take 1 tablet (100 mg) by mouth 3 times daily    Essential hypertension       isosorbide mononitrate 20 MG tablet    ISMO/MONOKET    540 tablet    Take 2 tablets (40 mg) by mouth 3 times daily    Coronary artery disease with other form of angina pectoris       itraconazole 100 MG capsule    SPORANOX    180 capsule    Take 2 capsules (200 mg) by mouth daily    Dermatophytosis of nail       metoprolol 100 MG tablet    LOPRESSOR    180 tablet    TAKE ONE TABLET BY MOUTH TWICE DAILY    Essential hypertension       multivitamin  with lutein Caps per capsule      Take 1 capsule by mouth daily        NITROSTAT 0.4 MG sublingual tablet   Generic drug:  nitroGLYcerin     25 tablet    DISSOLVE ONE TABLET UNDER THE TONGUE EVERY 5 MINUTES AS NEEDED FOR CHEST PAIN.  DO NOT EXCEED A TOTAL OF 3 DOSES IN 15 MINUTES. CALL 911.    CAD  (coronary artery disease)       omeprazole 40 MG capsule    priLOSEC    90 capsule    TAKE ONE CAPSULE BY MOUTH ONCE DAILY    Esophageal reflux       order for DME     1 each    Equipment being ordered: Left hand and wrist brace    Pain of left hand, Fall from standing, initial encounter       potassium chloride 10 MEQ tablet    K-TAB,KLOR-CON    270 tablet    Take 2 tabs in AM, 1 tab in PM    Hypokalemia       pravastatin 80 MG tablet    PRAVACHOL    90 tablet    TAKE ONE TABLET BY MOUTH ONCE DAILY    Hyperlipidemia LDL goal <130       timolol 0.5 % ophthalmic solution    TIMOPTIC     Place 1 drop into both eyes daily

## 2017-11-24 DIAGNOSIS — M10.9 GOUT: ICD-10-CM

## 2017-11-28 RX ORDER — ALLOPURINOL 100 MG/1
100 TABLET ORAL DAILY
Qty: 90 TABLET | Refills: 3 | Status: SHIPPED | OUTPATIENT
Start: 2017-11-28 | End: 2018-12-19

## 2017-11-28 NOTE — TELEPHONE ENCOUNTER
allopurinol   Last Written Prescription Date:  5/25/17  Last Fill Quantity: 90,   # refills: 1  Last Office Visit : 10/11/17  Future Office visit:  no    CBC RESULTS:   Recent Labs   Lab Test  11/08/17   1012   WBC  10.6   RBC  3.62*   HGB  10.6*   HCT  36.3   MCV  100   MCH  29.3   MCHC  29.2*   RDW  14.6   PLT  181       Creatinine   Date Value Ref Range Status   11/08/2017 2.36 (H) 0.52 - 1.04 mg/dL Final   ]  Routing refill request to clinic nurse for review/approval because:  Elevated creat.

## 2017-12-14 DIAGNOSIS — I10 ESSENTIAL HYPERTENSION: ICD-10-CM

## 2017-12-18 RX ORDER — METOPROLOL TARTRATE 100 MG
100 TABLET ORAL 2 TIMES DAILY
Qty: 180 TABLET | Refills: 3 | Status: SHIPPED | OUTPATIENT
Start: 2017-12-18 | End: 2018-12-17

## 2017-12-21 DIAGNOSIS — Z95.820 S/P ANGIOPLASTY WITH STENT: ICD-10-CM

## 2017-12-21 DIAGNOSIS — I21.4 NSTEMI (NON-ST ELEVATED MYOCARDIAL INFARCTION) (H): ICD-10-CM

## 2017-12-26 NOTE — TELEPHONE ENCOUNTER
plavix    Last Written Prescription Date:  3/21/17  Last Fill Quantity: 90,   # refills: 2  Last Office Visit : 10/11/17  Future Office visit:  no    Routing refill request to clinic nurse for review/approval because:  Failed protocol due to Prilosec on med list

## 2017-12-27 RX ORDER — CLOPIDOGREL BISULFATE 75 MG/1
TABLET ORAL
Qty: 90 TABLET | Refills: 1 | Status: SHIPPED | OUTPATIENT
Start: 2017-12-27 | End: 2018-07-12

## 2018-02-22 ENCOUNTER — ALLIED HEALTH/NURSE VISIT (OUTPATIENT)
Dept: CARDIOLOGY | Facility: CLINIC | Age: 83
End: 2018-02-22
Attending: INTERNAL MEDICINE
Payer: COMMERCIAL

## 2018-02-22 DIAGNOSIS — I49.5 SINUS NODE DYSFUNCTION (H): Primary | ICD-10-CM

## 2018-02-22 PROCEDURE — 93296 REM INTERROG EVL PM/IDS: CPT | Mod: ZF

## 2018-02-22 PROCEDURE — 93294 REM INTERROG EVL PM/LDLS PM: CPT | Performed by: INTERNAL MEDICINE

## 2018-02-22 NOTE — MR AVS SNAPSHOT
After Visit Summary   2/22/2018    Tessa Mansfield    MRN: [id number]           Patient Information     Date Of Birth          1935        Visit Information        Provider Department      2/22/2018 6:00 AM UC ICD REMOTE [facility]        Today's Diagnoses     Sinus node dysfunction (H)    -  1       Follow-ups after your visit        Additional Services     Follow-Up with Device Clinic - 3 months       Please schedule device and NP appts together            Follow-Up with Electrophysiologist- 3  Months       Please schedule NP and device appts on same day.                  Your next 10 appointments already scheduled     May 16, 2018  2:00 PM CDT   (Arrive by 1:45 PM)   Pacemaker Check with  Cv Device 1   [facility] ([facility])    [address] [place] [address] [address]  [address]  [facility] [id number] [phone]            Aug 08, 2018  9:05 AM CDT   (Arrive by 8:35 AM)   Return Visit with Marzena Martin MD   [facility] ([facility])    [address]  [address]  [facility] [id number] [phone]              Future tests that were ordered for you today     Open Future Orders        Priority Expected Expires Ordered    Follow-Up with Device Clinic - 3 months Routine 5/24/2018 8/22/2018 2/23/2018    Follow-Up with Electrophysiologist- 3  Months Routine 5/24/2018 8/22/2018 2/23/2018            Who to contact     If you have questions or need follow up information about today's clinic visit or your schedule please contact [facility] directly at [phone].  Normal or non-critical lab and imaging results will be communicated to you by MyChart, letter or phone within 4 business days after the clinic has received the results. If you do not hear from us within 7 days, please contact the clinic through MyChart or phone. If you have a critical or abnormal lab result, we will notify you by phone as soon as  "possible.  Submit refill requests through IPexpert or call your pharmacy and they will forward the refill request to us. Please allow 3 business days for your refill to be completed.          Additional Information About Your Visit        [employer] Information     IPexpert lets you send messages to your doctor, view your test results, renew your prescriptions, schedule appointments and more. To sign up, go to [email or web address] . Click on \"Log in\" on the left side of the screen, which will take you to the Welcome page. Then click on \"Sign up Now\" on the right side of the page.     You will be asked to enter the access code listed below, as well as some personal information. Please follow the directions to create your username and password.     Your access code is: [email or web address]  Expires: 2018  1:26 PM     Your access code will  in 90 days. If you need help or a new code, please call your [place] clinic or [phone].        Care EveryWhere ID     This is your Care EveryWhere ID. This could be used by other organizations to access your [place] medical records  [id number]         Blood Pressure from Last 3 Encounters:   17 137/69   17 134/73   10/11/17 138/76    Weight from Last 3 Encounters:   17 68.9 kg (151 lb 14.4 oz)   17 67.9 kg (149 lb 12.8 oz)   10/11/17 68.5 kg (151 lb)              We Performed the Following     INTERROGATION DEVICE EVAL REMOTE, PACER/ICD        Primary Care Provider Office Phone # Fax #    Pedro Gomez [phone]       5 [address] [address]        Equal Access to Services     LILO [employer]LLORY : Lis Gross, rohith smith, kelly franco, h[place] fisher. So [facility] [phone].    ATENCIÓN: Si habla español, tiene a braga disposición servicios gratuitos de asistencia lingüística. Llame al [phone].    We comply with applicable federal civil rights laws " and [place] laws. We do not discriminate on the basis of race, color, national origin, age, disability, sex, sexual orientation, or gender identity.            Thank you!     Thank you for choosing [facility]  for your care. Our goal is always to provide you with excellent care. Hearing back from our patients is one way we can continue to improve our services. Please take a few minutes to complete the written survey that you may receive in the mail after your visit with us. Thank you!             Your Updated Medication List - Protect others around you: Learn how to safely use, store and throw away your medicines at www.disposemymeds.org.          This list is accurate as of 2/22/18 11:59 PM.  Always use your most recent med list.                   Brand Name Dispense Instructions for use Diagnosis    allopurinol 100 MG tablet    ZYLOPRIM    90 tablet    Take 1 tablet (100 mg) by mouth daily    Gout       aspirin 81 MG tablet      Take 1 tablet by mouth daily.        clopidogrel 75 MG tablet    PLAVIX    90 tablet    TAKE ONE TABLET BY MOUTH ONCE DAILY    NSTEMI (non-ST elevated myocardial infarction) (H), S/P angioplasty with stent       cyanocobalamin 1000 MCG tablet    vitamin  B-12     Take 1 tablet by mouth daily. 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  DO NOT EXCEED A TOTAL OF 3 DOSES IN 15 MINUTES. CALL 911.    CAD (coronary artery disease)       omeprazole 40 MG capsule    priLOSEC    90 capsule    TAKE ONE CAPSULE BY MOUTH ONCE DAILY    Esophageal reflux       order for DME     1 each    Equipment being ordered: Left hand and wrist brace    Pain of left hand, Fall from standing, initial encounter       potassium chloride 10 MEQ tablet    K-TAB,KLOR-CON    270 tablet    Take 2 tabs in AM, 1 tab in PM    Hypokalemia       pravastatin 80 MG tablet    PRAVACHOL    90 tablet    TAKE ONE TABLET BY MOUTH ONCE DAILY    Hyperlipidemia LDL goal <130       timolol 0.5 % ophthalmic solution    TIMOPTIC     Place 1 drop into both eyes daily

## 2018-02-22 NOTE — PROGRESS NOTES
Preliminary Device Interrogation Results.  Final physician signed paceart report to be scanned and attached.    Remote pacemaker transmission received and reviewed.  Device transmission sent per MD orders.  Patient has a Medtronic dual lead pacemaker.  Normal pacemaker function.  61 mode switch episodes recorded - 54 sec - 5 min 5 sec in duration.  AF burden = <0.1%.  Patient is taking Plavix and ASA daily.  Presenting EGM = AP-VS @ 65 bpm.  AP = 85.4%.   = 0.2%.  Estimated battery longevity to LOLA = 2.5 years.  Patient notified of interrogation results.  Patient reports that she is feeling well and denies specific complaints.  Plan for patient to return to clinic in 3 months as scheduled for next device check and appt with EP NP.    Remote Pacemaker transmission

## 2018-02-26 ENCOUNTER — TRANSFERRED RECORDS (OUTPATIENT)
Dept: HEALTH INFORMATION MANAGEMENT | Facility: CLINIC | Age: 83
End: 2018-02-26

## 2018-03-30 ENCOUNTER — RECORDS - HEALTHEAST (OUTPATIENT)
Dept: LAB | Facility: CLINIC | Age: 83
End: 2018-03-30

## 2018-03-30 LAB
ANION GAP SERPL CALCULATED.3IONS-SCNC: 8 MMOL/L (ref 5–18)
BUN SERPL-MCNC: 42 MG/DL (ref 8–28)
CALCIUM SERPL-MCNC: 8.3 MG/DL (ref 8.5–10.5)
CHLORIDE BLD-SCNC: 108 MMOL/L (ref 98–107)
CO2 SERPL-SCNC: 25 MMOL/L (ref 22–31)
CREAT SERPL-MCNC: 1.75 MG/DL (ref 0.6–1.1)
GFR SERPL CREATININE-BSD FRML MDRD: 28 ML/MIN/1.73M2
GLUCOSE BLD-MCNC: 92 MG/DL (ref 70–125)
POTASSIUM BLD-SCNC: 3.7 MMOL/L (ref 3.5–5)
SODIUM SERPL-SCNC: 141 MMOL/L (ref 136–145)

## 2018-04-02 LAB
ERYTHROCYTE [DISTWIDTH] IN BLOOD BY AUTOMATED COUNT: 15.9 % (ref 11–14.5)
HCT VFR BLD AUTO: 26.2 % (ref 35–47)
HGB BLD-MCNC: 8.2 G/DL (ref 12–16)
MCH RBC QN AUTO: 29.1 PG (ref 27–34)
MCHC RBC AUTO-ENTMCNC: 31.3 G/DL (ref 32–36)
MCV RBC AUTO: 93 FL (ref 80–100)
PLATELET # BLD AUTO: 385 THOU/UL (ref 140–440)
PMV BLD AUTO: 10.4 FL (ref 8.5–12.5)
RBC # BLD AUTO: 2.82 MILL/UL (ref 3.8–5.4)
WBC: 11.8 THOU/UL (ref 4–11)

## 2018-04-07 ENCOUNTER — MEDICAL CORRESPONDENCE (OUTPATIENT)
Dept: HEALTH INFORMATION MANAGEMENT | Facility: CLINIC | Age: 83
End: 2018-04-07

## 2018-04-09 ENCOUNTER — TELEPHONE (OUTPATIENT)
Dept: FAMILY MEDICINE | Facility: CLINIC | Age: 83
End: 2018-04-09

## 2018-04-09 NOTE — TELEPHONE ENCOUNTER
[facility]  [phone]         Skilled nursing    1 x 1 week  2 x 1 week  1 x 6 weeks  3 prns    PT & OT Eval and treat

## 2018-04-12 ENCOUNTER — OFFICE VISIT (OUTPATIENT)
Dept: FAMILY MEDICINE | Facility: CLINIC | Age: 83
End: 2018-04-12
Payer: COMMERCIAL

## 2018-04-12 VITALS
BODY MASS INDEX: 22.51 KG/M2 | WEIGHT: 143.7 LBS | HEART RATE: 73 BPM | SYSTOLIC BLOOD PRESSURE: 128 MMHG | DIASTOLIC BLOOD PRESSURE: 75 MMHG

## 2018-04-12 DIAGNOSIS — Z09 HOSPITAL DISCHARGE FOLLOW-UP: Primary | ICD-10-CM

## 2018-04-12 ASSESSMENT — PAIN SCALES - GENERAL: PAINLEVEL: MODERATE PAIN (5)

## 2018-04-12 NOTE — MR AVS SNAPSHOT
After Visit Summary   4/12/2018    Tessa Mansfield    MRN: [id number]           Patient Information     Date Of Birth          1935        Visit Information        Provider Department      4/12/2018 1:05 PM Pedro Gomez MD [facility]         Follow-ups after your visit        Your next 10 appointments already scheduled     May 16, 2018  2:00 PM CDT   (Arrive by 1:45 PM)   Pacemaker Check with  Cv Device 1   [facility] ([facility])    [address]  [address]  [facility] [id number] [phone]            Jun 20, 2018 12:35 PM CDT   (Arrive by 12:20 PM)   Return Visit with Pedro Gomez MD   [facility] ([facility])    [address] [place] [address]  4th Floor  [facility] [id number] [phone]            Aug 15, 2018  9:30 AM CDT   Lab with  LAB   [facility] ([facility])    [address] [place] [address]  1st Floor  [facility] [id number] [phone]            Aug 15, 2018 10:20 AM CDT   (Arrive by 9:50 AM)   Return Visit with Marzena Martin MD   [facility] ([facility])    [address] [place] [address]  [address]  [facility] [id number] [phone]              Who to contact     Please call your clinic at [phone] to:    Ask questions about your health    Make or cancel appointments    Discuss your medicines    Learn about your test results    Speak to your doctor            Additional Information About Your Visit        MyChart Information     Tapit is an electronic gateway that provides easy, online access to your medical records. With Tapit, you can request a clinic appointment, read your test results, renew a prescription or communicate with your care team.     To sign up for Tapit visit the website at [email or web address]   You will be asked to enter the access code listed below, as well as  some personal information. Please follow the directions to create your username and password.     Your access code is: [email or web address]  Expires: 2018  2:26 PM     Your access code will  in 90 days. If you need help or a new code, please contact your [facility] or call [phone] for assistance.        Care EveryWhere ID     This is your Care EveryWhere ID. This could be used by other organizations to access your [place] medical records  [id number]        Your Vitals Were     Pulse BMI (Body Mass Index)                73 22.51 kg/m2           Blood Pressure from Last 3 Encounters:   18 128/75   17 137/69   17 134/73    Weight from Last 3 Encounters:   18 65.2 kg (143 lb 11.2 oz)   17 68.9 kg (151 lb 14.4 oz)   17 67.9 kg (149 lb 12.8 oz)              Today, you had the following     No orders found for display         Today's Medication Changes          These changes are accurate as of 18  2:04 PM.  If you have any questions, ask your nurse or doctor.               These medicines have changed or have updated prescriptions.        Dose/Directions    isosorbide mononitrate 20 MG tablet   Commonly known as:  ISMO/MONOKET   This may have changed:  when to take this   Used for:  Coronary artery disease with other form of angina pectoris        Dose:  40 mg   Take 2 tablets (40 mg) by mouth 3 times daily   Quantity:  540 tablet   Refills:  3                Primary Care Provider Office Phone # Fax #    Pedro Gomez [phone]       3 [address] [address]        Equal Access to Services     FAYE GUILLORY : Hadallie Gross, rohith smith, qaybta kaalhouston burns. So [facility] [phone].    ATENCIÓN: Si habla español, tiene a braga disposición servicios gratuitos de asistencia lingüística. Llame al [phone].    We comply with applicable  federal civil rights laws and [place] laws. We do not discriminate on the basis of race, color, national origin, age, disability, sex, sexual orientation, or gender identity.            Thank you!     Thank you for choosing [facility]  for your care. Our goal is always to provide you with excellent care. Hearing back from our patients is one way we can continue to improve our services. Please take a few minutes to complete the written survey that you may receive in the mail after your visit with us. Thank you!             Your Updated Medication List - Protect others around you: Learn how to safely use, store and throw away your medicines at www.disposemymeds.org.          This list is accurate as of 4/12/18  2:04 PM.  Always use your most recent med list.                   Brand Name Dispense Instructions for use Diagnosis    allopurinol 100 MG tablet    ZYLOPRIM    90 tablet    Take 1 tablet (100 mg) by mouth daily    Gout       aspirin 81 MG tablet      Take 1 tablet by mouth daily.        clopidogrel 75 MG tablet    PLAVIX    90 tablet    TAKE ONE TABLET BY MOUTH ONCE DAILY    NSTEMI (non-ST elevated myocardial infarction) (H), S/P angioplasty with stent       cyanocobalamin 1000 MCG tablet    vitamin  B-12     Take 1 tablet by mouth daily. As directed        furosemide 40 MG tablet    LASIX    90 tablet    Take 1 tablet (40 mg) by mouth daily    Essential hypertension       hydrALAZINE 100 MG Tabs tablet    APRESOLINE    270 tablet    Take 1 tablet (100 mg) by mouth 3 times daily    Essential hypertension       isosorbide mononitrate 20 MG tablet    ISMO/MONOKET    540 tablet    Take 2 tablets (40 mg) by mouth 3 times daily    Coronary artery disease with other form of angina pectoris       itraconazole 100 MG capsule    SPORANOX    180 capsule    Take 2 capsules (200 mg) by mouth daily    Dermatophytosis of nail       metoprolol tartrate 100 MG tablet    LOPRESSOR    180 tablet    Take 1  tablet (100 mg) by mouth 2 times daily    Essential hypertension       multivitamin  with lutein Caps per capsule      Take 1 capsule by mouth daily        NITROSTAT 0.4 MG sublingual tablet   Generic drug:  nitroGLYcerin     25 tablet    DISSOLVE ONE TABLET UNDER THE TONGUE EVERY 5 MINUTES AS NEEDED FOR CHEST PAIN.  DO NOT EXCEED A TOTAL OF 3 DOSES IN 15 MINUTES. CALL 911.    CAD (coronary artery disease)       omeprazole 40 MG capsule    priLOSEC    90 capsule    TAKE ONE CAPSULE BY MOUTH ONCE DAILY    Esophageal reflux       order for DME     1 each    Equipment being ordered: Left hand and wrist brace    Pain of left hand, Fall from standing, initial encounter       potassium chloride 10 MEQ tablet    K-TAB,KLOR-CON    270 tablet    Take 2 tabs in AM, 1 tab in PM    Hypokalemia       pravastatin 80 MG tablet    PRAVACHOL    90 tablet    TAKE ONE TABLET BY MOUTH ONCE DAILY    Hyperlipidemia LDL goal <130       timolol 0.5 % ophthalmic solution    TIMOPTIC     Place 1 drop into both eyes daily

## 2018-04-12 NOTE — PROGRESS NOTES
[facility]  Primary Care Center   Pedro Gomez MD  04/12/2018      Chief Complaint:   Surgical Follow up    History of Present Illness:   Tessa Mansfield is a 83 year old female on Plavix with a history of CAD, edema, hypertension, hyperlipidemia, pacemaker, NSTEMI with multiple stents, CKD stage IV, degeneration of cervical intervertebral disc and KEVYN who presents to clinic for surgical follow up. Per medical record, the patient was walking down her stairs when she missed the last step and fell to her right hip. Her  subsequently activated EMS and was brought to an [facility] emergency department. She was evaluated with imaging (as noted below), and was found to have a right femoral fracture and given narcotics for the pain. She received surgery on 3/19/18 with subsequent admission. She was discharge on 3/23/18 into transitional care at [facility], she had rehab there and was discharged on 4/6/18. Since being at home she has had lots of hip and abdominal pain, and states this was the case in rehab as well. She is taking extra strength Tylenol with some relief. She has had an appetite, been able to get up stairs, and urinating well. She endorses that she is gassy with her abdominal pain with an episode of nonbloody diarrhea this morning. She has had no vomiting. She currently has nurse, physical therapy, and OT coming to her home.     We discussed her blood pressure, which she has not been keeping track of at home. She has not been on hydralazine and her measurement today was 128/75. She is otherwise taking her other blood pressure medications.     Reviewed her kidney function labs from [facility] and they were similar to the past.     Health Maintenance:  I discussed the new shingles vaccine, and recommended she contact her insurance company to see if it covered.     Immunization History   Administered Date(s) Administered     Influenza (H1N1) 02/09/2010     Influenza (High Dose) 3 valent vaccine 12/18/2015,  09/28/2016, 10/11/2017     Influenza (IIV3) PF 10/29/2004, 11/02/2005, 10/24/2006, 10/31/2007, 12/19/2008, 10/16/2010, 09/19/2012, 12/30/2014     Pneumo Conj 13-V (2010&after) 12/30/2014     Pneumococcal 23 valent 12/06/2002, 09/19/2012     TD (ADULT, 7+) 12/06/2002, 02/13/2004     TDAP Vaccine (Boostrix) 09/11/2012     Tdap (Adacel,Boostrix) 02/21/2013     Zoster vaccine, live 02/18/2011        The following health maintenance issues were also reviewed and addressed as needed:  Blood pressure (>18 years annually)  Lifestyle habits (including exercise, diet, weight)  Health risk behaviors (sexual history, alcohol, tobacco, drug use, partner violence)  Routine eye, dental, hearing, and skin exams     Review of Systems:   Pertinent items are noted in HPI, remainder of complete ROS is negative.      Active Medications:     Current Outpatient Prescriptions:      isosorbide mononitrate (ISMO/MONOKET) 20 MG tablet, Take 2 tablets (40 mg) by mouth 3 times daily (Patient taking differently: Take 40 mg by mouth 2 times daily ), Disp: 540 tablet, Rfl: 3     clopidogrel (PLAVIX) 75 MG tablet, TAKE ONE TABLET BY MOUTH ONCE DAILY, Disp: 90 tablet, Rfl: 1     metoprolol (LOPRESSOR) 100 MG tablet, Take 1 tablet (100 mg) by mouth 2 times daily, Disp: 180 tablet, Rfl: 3     allopurinol (ZYLOPRIM) 100 MG tablet, Take 1 tablet (100 mg) by mouth daily, Disp: 90 tablet, Rfl: 3     potassium chloride (K-TAB,KLOR-CON) 10 MEQ tablet, Take 2 tabs in AM, 1 tab in PM, Disp: 270 tablet, Rfl: 3     omeprazole (PRILOSEC) 40 MG capsule, TAKE ONE CAPSULE BY MOUTH ONCE DAILY, Disp: 90 capsule, Rfl: 3     hydrALAZINE (APRESOLINE) 100 MG TABS tablet, Take 1 tablet (100 mg) by mouth 3 times daily, Disp: 270 tablet, Rfl: 3     pravastatin (PRAVACHOL) 80 MG tablet, TAKE ONE TABLET BY MOUTH ONCE DAILY, Disp: 90 tablet, Rfl: 3     furosemide (LASIX) 40 MG tablet, Take 1 tablet (40 mg) by mouth daily, Disp: 90 tablet, Rfl: 3     NITROSTAT 0.4 MG sublingual  tablet, DISSOLVE ONE TABLET UNDER THE TONGUE EVERY 5 MINUTES AS NEEDED FOR CHEST PAIN.  DO NOT EXCEED A TOTAL OF 3 DOSES IN 15 MINUTES. CALL 911., Disp: 25 tablet, Rfl: 3     order for DME, Equipment being ordered: Left hand and wrist brace, Disp: 1 each, Rfl: 0     itraconazole (SPORANOX) 100 MG capsule, Take 2 capsules (200 mg) by mouth daily (Patient not taking: Reported on 11/16/2017), Disp: 180 capsule, Rfl: 0     timolol (TIMOPTIC) 0.5 % ophthalmic solution, Place 1 drop into both eyes daily , Disp: , Rfl:      multivitamin  with lutein (OCUVITE WITH LTEIN) CAPS, Take 1 capsule by mouth daily, Disp: , Rfl:      aspirin 81 MG tablet, Take 1 tablet by mouth daily., Disp: , Rfl:      cyanocolbalamin (VITAMIN B-12) 1000 MCG tablet, Take 1 tablet by mouth daily. As directed, Disp: , Rfl:       Allergies:   Bactrim [sulfamethoxazole w/trimethoprim]; Biaxin [clarithromycin]; Chlorthalidone; Clonidine; Codeine sulfate; Darvon [propoxyphene hcl]; Dilaudid [hydromorphone]; Gabapentin; Indocin; Levaquin [levofloxacin hemihydrate]; Lyrica; Morphine sulfate; Percocet [oxycodone-acetaminophen]; Spironolactone; Terazosin; Ciprofloxacin; and Simvastatin      Past Medical History:  Degeneration of cervical intervertebral disc  Nonallopathic lesion of cervical region  Nonallopathic lesion of thoracic region  Coronary artery disease  Dizziness and giddiness  Edema  Esophageal reflux  Gout  Essential hypertension  Obstructive sleep apnea  Osteomalacia  Post-menopausal osteoporosis  Cardiac Pacemaker- Medtronic, dual chamber- NOT dependant  Sinus node dysfunction   Disturbance of skin sensation  NSTEMI   Arrhythmia  Chest pain  CKD stage IV   Hyperlipidemia LDL goal <70   Transient complete heart block   Past Surgical History:  Stented coronary artery 10- RCA  Stented coronary artery 2-5-2003 LCx  Stented coronary artery 4- LAD  Stented coronary artery 11- LAD  Stented coronary artery 12-  RCA  Cholecystectomy   Hysterectomy   Insertion new replacement atrial and ventricular lead  Family History:   Sister - bladder cancer       Social History:   Presents with her    Tobacco use: Former smoker, quit after 15 years at 0.30 PPD   Smokeless tobacco: quit 22 years ago   Alcohol consumption: quit 22 years ago   Marital status:       Physical Exam:   /75  Pulse 73  Wt 65.2 kg (143 lb 11.2 oz)  BMI 22.51 kg/m2   Constitutional: Alert. In no distress.  Head: The scalp, face, and head appear normal.  Musculoskeletal: Extremities appear normal. No gross deformities noted.   Neurologic: Gait normal. Speech is normal and fluent.  Psychiatric: Mentation appears normal. Normal affect.       Assessment and Plan:  Follow up with me next time with a blood pressure log. Will check her CBC and BMP next time.   She will check with her insurance to see if she can obtain coverage for the new shingles vaccine.   Will continue to hold off on hydralazine       Follow-up: PRN      Scribe Disclosure:   IJosé Luis, am serving as a scribe to document services personally performed by Pedro Gomez MD at this visit, based upon the provider's statements to me. All documentation has been reviewed by the aforementioned provider prior to being entered into the official medical record.     Portions of this medical record were completed by a scribe. UPON MY REVIEW AND AUTHENTICATION BY ELECTRONIC SIGNATURE, this confirms (a) I performed the applicable clinical services, and (b) the record is accurate.    Pedro Gomez MD

## 2018-05-11 DIAGNOSIS — R07.9 CHEST PAIN: ICD-10-CM

## 2018-05-11 DIAGNOSIS — I25.10 CORONARY ARTERY DISEASE INVOLVING NATIVE CORONARY ARTERY OF NATIVE HEART WITHOUT ANGINA PECTORIS: Primary | ICD-10-CM

## 2018-05-11 RX ORDER — ISOSORBIDE MONONITRATE 20 MG/1
40 TABLET ORAL 3 TIMES DAILY
Qty: 540 TABLET | Refills: 1 | Status: SHIPPED | OUTPATIENT
Start: 2018-05-11 | End: 2018-12-19

## 2018-05-16 ENCOUNTER — ALLIED HEALTH/NURSE VISIT (OUTPATIENT)
Dept: CARDIOLOGY | Facility: CLINIC | Age: 83
End: 2018-05-16
Attending: INTERNAL MEDICINE
Payer: COMMERCIAL

## 2018-05-16 DIAGNOSIS — I49.5 SINUS NODE DYSFUNCTION (H): Primary | ICD-10-CM

## 2018-05-16 PROCEDURE — 93280 PM DEVICE PROGR EVAL DUAL: CPT | Mod: ZF

## 2018-05-16 NOTE — PROGRESS NOTES
Preliminary Device Interrogation Results.  Final physician signed paceart report to be scanned and attached.    Pt seen in the [facility] for evaluation and iterative programming of a Medtronic, dual lead pacemaker, per MD orders. Today her intrinsic rhythm is SB 54 bpm. Normal pacemaker function. No arrhythmias have been recorded over the last 3 months. Lead trends appear stable. AP= 81% and = 0.1%. Pt reports she is healing from hip replacement surgery. Battery estimates 28 months (8-47 months) to LOLA. This device model has been going to LOLA closer to the minimum time rather than the estimated time. Pt will see Dr. Edwards to discuss pacemaker generator change, in 6 months Plan for pt to send a remote transmission in 3 months and RTC in 6 months.  Dual lead pacemaker

## 2018-05-16 NOTE — MR AVS SNAPSHOT
After Visit Summary   5/16/2018    Tessa Mansfield    MRN: [id number]           Patient Information     Date Of Birth          1935        Visit Information        Provider Department      5/16/2018 2:00 PM 1,  Cv Device [facility]        Today's Diagnoses     Sinus node dysfunction (H)    -  1      Care Instructions    It was a pleasure to see you in clinic today. Please do not hesitate to call with any questions or concerns. You are scheduled for a remote pacemaker transmission on 8/21/18. We will call in 1-2 business days to discuss the results with you. We look forward to seeing you in clinic at your next device check in 6 months.    Belen Martinez, RN  Electrophysiology Nurse Clinician  [facility]  During business hours call:  [phone]  After business hours please call: [phone]- select option #4 and ask for job code [phone].            Follow-ups after your visit        Additional Services     Follow-Up with Cardiologist       Pacemaker check prior            Follow-Up with Cardiologist       Same date as Grace and device appts            Follow-Up with Device Clinic-6 months       Prior to Grace appt                  Your next 10 appointments already scheduled     Jun 20, 2018 12:35 PM CDT   (Arrive by 12:20 PM)   Return Visit with Pedro Gomez MD   [facility] ([facility])    [address]  4th Floor  [facility] [place] [id number] [phone]            Aug 15, 2018  9:30 AM CDT   Lab with  LAB   [facility] ([facility])    [address]  1st [address]  [place] [place] [id number] [phone]            Aug 15, 2018 10:20 AM CDT   (Arrive by 9:50 AM)   Return Visit with Marzena Martin MD   [facility] ([facility])    [address]  [address]  [place] [place] [id number] [phone]            Nov 13,  2018 10:00 AM CST   (Arrive by 9:45 AM)   Pacemaker Check with Uc Cv Device 1   [facility] ([facility])    [address]  [address]  [facility] [address] [phone]            Nov 13, 2018 10:30 AM CST   (Arrive by 10:15 AM)   NEW ARRHYTHMIA with Max Edwards MD   [facility] ([facility])    [address] [place] [address] Se  [address]  [facility] [address] [phone]            Nov 13, 2018 12:00 PM CST   (Arrive by 11:45 AM)   Return Visit with Tyrell Santoyo MD   [facility] ([facility])    [address] [place] [address]  [address]  [facility] [address] [phone]              Future tests that were ordered for you today     Open Future Orders        Priority Expected Expires Ordered    Follow-Up with Cardiologist Routine 5/23/2018 8/21/2018 5/16/2018    Follow-Up with Device Clinic-6 months Routine 11/12/2018 2/10/2019 5/16/2018    Follow-Up with Cardiologist Routine 11/12/2018 2/10/2019 5/16/2018            Who to contact     If you have questions or need follow up information about today's clinic visit or your schedule please contact [facility] directly at [phone].  Normal or non-critical lab and imaging results will be communicated to you by [employer], letter or phone within 4 business days after the clinic has received the results. If you do not hear from us within 7 days, please contact the clinic through [employer] In[employer] or phone. If you have a critical or abnormal lab result, we will notify you by phone as soon as possible.  Submit refill requests through [employer] or call your pharmacy and they will forward the refill request to us. Please allow 3 business days for your refill to be completed.          Additional Information About Your Visit        [employer] Information     [employer] lets you send messages to your doctor, view your test results, renew your prescriptions, schedule  "appointments and more. To sign up, go to [email or web address] . Click on \"Log in\" on the left side of the screen, which will take you to the Welcome page. Then click on \"Sign up Now\" on the right side of the page.     You will be asked to enter the access code listed below, as well as some personal information. Please follow the directions to create your username and password.     Your access code is: [email or web address]  Expires: 2018  2:26 PM     Your access code will  in 90 days. If you need help or a new code, please call your [facility] clinic or [phone].        Care EveryWhere ID     This is your Care EveryWhere ID. This could be used by other organizations to access your [facility] medical records  [id number]         Blood Pressure from Last 3 Encounters:   18 128/75   17 137/69   17 134/73    Weight from Last 3 Encounters:   18 65.2 kg (143 lb 11.2 oz)   17 68.9 kg (151 lb 14.4 oz)   17 67.9 kg (149 lb 12.8 oz)              We Performed the Following     (23765) PM DEVICE PROGRAMMING EVAL, DUAL LEAD PACER          Today's Medication Changes          These changes are accurate as of 18  2:25 PM.  If you have any questions, ask your nurse or doctor.               These medicines have changed or have updated prescriptions.        Dose/Directions    potassium chloride 10 MEQ tablet   Commonly known as:  K-TAB,KLOR-CON   This may have changed:    - when to take this  - additional instructions   Used for:  Hypokalemia        Take 2 tabs in AM, 1 tab in PM   Quantity:  270 tablet   Refills:  3                Primary Care Provider Office Phone # Fax #    Pedro Gomez [phone]       [address] [address]        Equal Access to Services     FAYE GUILLORY AH: Lis Gross, rohith smith, [place] shelby. [facility] [phone].    ATENCIÓN: Si chika " español, tiene a braga disposición servicios gratuitos de asistencia lingüística. Thomas moore [phone].    We comply with applicable federal civil rights laws and [place] laws. We do not discriminate on the basis of race, color, national origin, age, disability, sex, sexual orientation, or gender identity.            Thank you!     Thank you for choosing [facility]  for your care. Our goal is always to provide you with excellent care. Hearing back from our patients is one way we can continue to improve our services. Please take a few minutes to complete the written survey that you may receive in the mail after your visit with us. Thank you!             Your Updated Medication List - Protect others around you: Learn how to safely use, store and throw away your medicines at www.disposemymeds.org.          This list is accurate as of 5/16/18  2:25 PM.  Always use your most recent med list.                   Brand Name Dispense Instructions for use Diagnosis    allopurinol 100 MG tablet    ZYLOPRIM    90 tablet    Take 1 tablet (100 mg) by mouth daily    Gout       aspirin 81 MG tablet      Take 1 tablet by mouth daily.        clopidogrel 75 MG tablet    PLAVIX    90 tablet    TAKE ONE TABLET BY MOUTH ONCE DAILY    NSTEMI (non-ST elevated myocardial infarction) (H), S/P angioplasty with stent       cyanocobalamin 1000 MCG tablet    vitamin  B-12     Take 500 mcg by mouth daily As directed        furosemide 40 MG tablet    LASIX    90 tablet    Take 1 tablet (40 mg) by mouth daily    Essential hypertension       hydrALAZINE 100 MG Tabs tablet    APRESOLINE    270 tablet    Take 1 tablet (100 mg) by mouth 3 times daily    Essential hypertension       isosorbide mononitrate 20 MG tablet    ISMO/MONOKET    540 tablet    Take 2 tablets (40 mg) by mouth 3 times daily Please make a follow up appointment for further refills.    Coronary artery disease involving native coronary artery of native heart without angina  pectoris, Chest pain       itraconazole 100 MG capsule    SPORANOX    180 capsule    Take 2 capsules (200 mg) by mouth daily    Dermatophytosis of nail       metoprolol tartrate 100 MG tablet    LOPRESSOR    180 tablet    Take 1 tablet (100 mg) by mouth 2 times daily    Essential hypertension       multivitamin  with lutein Caps per capsule      Take 1 capsule by mouth daily        NITROSTAT 0.4 MG sublingual tablet   Generic drug:  nitroGLYcerin     25 tablet    DISSOLVE ONE TABLET UNDER THE TONGUE EVERY 5 MINUTES AS NEEDED FOR CHEST PAIN.  DO NOT EXCEED A TOTAL OF 3 DOSES IN 15 MINUTES. CALL 911.    CAD (coronary artery disease)       omeprazole 40 MG capsule    priLOSEC    90 capsule    TAKE ONE CAPSULE BY MOUTH ONCE DAILY    Esophageal reflux       order for DME     1 each    Equipment being ordered: Left hand and wrist brace    Pain of left hand, Fall from standing, initial encounter       potassium chloride 10 MEQ tablet    K-TAB,KLOR-CON    270 tablet    Take 2 tabs in AM, 1 tab in PM    Hypokalemia       pravastatin 80 MG tablet    PRAVACHOL    90 tablet    TAKE ONE TABLET BY MOUTH ONCE DAILY    Hyperlipidemia LDL goal <130       timolol 0.5 % ophthalmic solution    TIMOPTIC     Place 1 drop into both eyes daily

## 2018-05-16 NOTE — PATIENT INSTRUCTIONS
It was a pleasure to see you in clinic today. Please do not hesitate to call with any questions or concerns. You are scheduled for a remote pacemaker transmission on 8/21/18. We will call in 1-2 business days to discuss the results with you. We look forward to seeing you in clinic at your next device check in 6 months.    Belen Martinez, RN  Electrophysiology Nurse Clinician  [facility]  During business hours call:  [phone]  After business hours please call: [phone]- select option #4 and ask for job code [phone].

## 2018-06-02 ENCOUNTER — HEALTH MAINTENANCE LETTER (OUTPATIENT)
Age: 83
End: 2018-06-02

## 2018-06-20 ENCOUNTER — OFFICE VISIT (OUTPATIENT)
Dept: FAMILY MEDICINE | Facility: CLINIC | Age: 83
End: 2018-06-20
Payer: COMMERCIAL

## 2018-06-20 VITALS
WEIGHT: 149.6 LBS | SYSTOLIC BLOOD PRESSURE: 179 MMHG | BODY MASS INDEX: 23.43 KG/M2 | DIASTOLIC BLOOD PRESSURE: 80 MMHG | HEART RATE: 63 BPM

## 2018-06-20 DIAGNOSIS — I25.9 CHRONIC ISCHEMIC HEART DISEASE: ICD-10-CM

## 2018-06-20 DIAGNOSIS — Z00.00 ROUTINE GENERAL MEDICAL EXAMINATION AT A HEALTH CARE FACILITY: Primary | ICD-10-CM

## 2018-06-20 ASSESSMENT — PAIN SCALES - GENERAL: PAINLEVEL: EXTREME PAIN (8)

## 2018-06-20 NOTE — NURSING NOTE
Chief Complaint   Patient presents with     Hypertension       Anjelica Nuñez LPN at 12:35 PM on June 20, 2018    Rooming Note  Blood Pressure   BP Readings from Last 1 Encounters:   04/12/18 128/75    Single BP recheck started, 12:37 PM (2 minutes)

## 2018-06-20 NOTE — MR AVS SNAPSHOT
After Visit Summary   6/20/2018    Tessa Mansfield    MRN: [id number]           Patient Information     Date Of Birth          1935        Visit Information        Provider Department      6/20/2018 12:35 PM Pedro Gomez MD [facility]        Today's Diagnoses     Routine general medical examination at a health care facility    -  1    Chronic ischemic heart disease           Follow-ups after your visit        Follow-up notes from your care team     Return in about 6 months (around 12/20/2018).      Your next 10 appointments already scheduled     Aug 15, 2018  9:30 AM CDT   Lab with  LAB   [facility] ([facility])    [address] [place] [address]  1st Floor  [facility] [address]   [phone]            Aug 15, 2018 10:20 AM CDT   (Arrive by 9:50 AM)   Return Visit with Marzena Martin MD   [facility] ([facility])    [address] [place] [address]  [address]  [facility] [address]   [phone]            Nov 13, 2018 10:00 AM CST   (Arrive by 9:45 AM)   Pacemaker Check with  Cv Device 1   [facility] ([facility])    [address]  [address]  [facility] [address]   [phone]            Nov 13, 2018 10:30 AM CST   (Arrive by 10:15 AM)   NEW ARRHYTHMIA with Max Edwards MD   [facility] ([facility])    [address] [place] [address]  [address]  [facility] [address]   [phone]            Nov 13, 2018 12:00 PM CST   (Arrive by 11:45 AM)   Return Visit with Tyrell Santoyo MD   [facility] ([facility])    [address] [place] [address]  [address]  [facility] [address]   [phone]            Dec 19, 2018 12:50 PM CST   (Arrive by 12:35 PM)   Return Visit with Pedro Gomez MD   [facility] ([facility])    [address] [place] [address]  [address]th [facility]  [place] [address]   [phone]              Future tests that were ordered for you today     Open Future Orders        Priority Expected Expires Ordered    CBC with platelets differential Routine 2018    Comprehensive metabolic panel Routine 2018    Lipid panel reflex to direct LDL Fasting Routine 2018            Who to contact     Please call your clinic at [phone] to:    Ask questions about your health    Make or cancel appointments    Discuss your medicines    Learn about your test results    Speak to your doctor            Additional Information About Your Visit        [employer] Information     Liquid5 is an electronic gateway that provides easy, online access to your medical records. With Liquid5, you can request a clinic appointment, read your test results, renew a prescription or communicate with your care team.     To sign up for Liquid5 visit the website at [email or web address]   You will be asked to enter the access code listed below, as well as some personal information. Please follow the directions to create your username and password.     Your access code is: [email or web address]  Expires: 2018  6:30 AM     Your access code will  in 90 days. If you need help or a new code, please contact your [facility] or call [phone] for assistance.        Care EveryWhere ID     This is your Care EveryWhere ID. This could be used by other organizations to access your [place] medical records  [id number]        Your Vitals Were     Pulse BMI (Body Mass Index)                63 23.43 kg/m2           Blood Pressure from Last 3 Encounters:   18 179/80   18 128/75   17 137/69    Weight from Last 3 Encounters:   18 67.9 kg (149 lb 9.6 oz)   18 65.2 kg (143 lb 11.2 oz)   17 68.9 kg (151 lb 14.4 oz)              We Performed the Following     ZOSTER VACCINE RECOMBINANT  ADJUVANTED IM NJX          Today's Medication Changes          These changes are accurate as of 6/20/18  1:02 PM.  If you have any questions, ask your nurse or doctor.               These medicines have changed or have updated prescriptions.        Dose/Directions    potassium chloride 10 MEQ tablet   Commonly known as:  K-TAB,KLOR-CON   This may have changed:    - when to take this  - additional instructions   Used for:  Hypokalemia        Take 2 tabs in AM, 1 tab in PM   Quantity:  270 tablet   Refills:  3         Stop taking these medicines if you haven't already. Please contact your care team if you have questions.     hydrALAZINE 100 MG Tabs tablet   Commonly known as:  APRESOLINE   Stopped by:  Pedro Gomez MD           itraconazole 100 MG capsule   Commonly known as:  SPORANOX   Stopped by:  Pedro Gomez MD                    Primary Care Provider Office Phone # Fax #    Pedro Gomez [phone]       [address] [address]        Equal Access to Services     [facility] AH: Hadii aad ku hadasho Soomaali, waaxda luqadaha, qaybta kaalmada adeegyada, waxay idiin hayaan adeeg kharanicki laumang . So [facility] [phone].    ATENCIÓN: Si habla español, tiene a braga disposición servicios gratuitos de asistencia lingüística. [facility] [phone].    We comply with applicable federal civil rights laws and [place] laws. We do not discriminate on the basis of race, color, national origin, age, disability, sex, sexual orientation, or gender identity.            Thank you!     Thank you for choosing [facility]  for your care. Our goal is always to provide you with excellent care. Hearing back from our patients is one way we can continue to improve our services. Please take a few minutes to complete the written survey that you may receive in the mail after your visit with us. Thank you!             Your Updated Medication List - Protect others around  you: Learn how to safely use, store and throw away your medicines at www.disposemymeds.org.          This list is accurate as of 6/20/18  1:02 PM.  Always use your most recent med list.                   Brand Name Dispense Instructions for use Diagnosis    allopurinol 100 MG tablet    ZYLOPRIM    90 tablet    Take 1 tablet (100 mg) by mouth daily    Gout       aspirin 81 MG tablet      Take 1 tablet by mouth daily.        clopidogrel 75 MG tablet    PLAVIX    90 tablet    TAKE ONE TABLET BY MOUTH ONCE DAILY    NSTEMI (non-ST elevated myocardial infarction) (H), S/P angioplasty with stent       cyanocobalamin 1000 MCG tablet    vitamin  B-12     Take 500 mcg by mouth daily As directed        furosemide 40 MG tablet    LASIX    90 tablet    Take 1 tablet (40 mg) by mouth daily    Essential hypertension       isosorbide mononitrate 20 MG tablet    ISMO/MONOKET    540 tablet    Take 2 tablets (40 mg) by mouth 3 times daily Please make a follow up appointment for further refills.    Coronary artery disease involving native coronary artery of native heart without angina pectoris, Chest pain       metoprolol tartrate 100 MG tablet    LOPRESSOR    180 tablet    Take 1 tablet (100 mg) by mouth 2 times daily    Essential hypertension       multivitamin  with lutein Caps per capsule      Take 1 capsule by mouth daily        NITROSTAT 0.4 MG sublingual tablet   Generic drug:  nitroGLYcerin     25 tablet    DISSOLVE ONE TABLET UNDER THE TONGUE EVERY 5 MINUTES AS NEEDED FOR CHEST PAIN.  DO NOT EXCEED A TOTAL OF 3 DOSES IN 15 MINUTES. CALL 911.    CAD (coronary artery disease)       omeprazole 40 MG capsule    priLOSEC    90 capsule    TAKE ONE CAPSULE BY MOUTH ONCE DAILY    Esophageal reflux       order for DME     1 each    Equipment being ordered: Left hand and wrist brace    Pain of left hand, Fall from standing, initial encounter       potassium chloride 10 MEQ tablet    K-TAB,KLOR-CON    270 tablet    Take 2 tabs in AM, 1  tab in PM    Hypokalemia       pravastatin 80 MG tablet    PRAVACHOL    90 tablet    TAKE ONE TABLET BY MOUTH ONCE DAILY    Hyperlipidemia LDL goal <130       timolol 0.5 % ophthalmic solution    TIMOPTIC     Place 1 drop into both eyes daily

## 2018-06-20 NOTE — PROGRESS NOTES
[facility]  Primary Care Center   Pedro Gomez MD  06/20/2018      Chief Complaint:   Hypertension       History of Present Illness:   Tessa Mansfield is a 83 year old female with a history of CAD, CKD stage IV, gout, hypertension, KEVYN, and NSTEMI who presents for evaluation of hypertension. The patient has been monitoring her blood pressure at home. The numbers have been within normal limits while she was taking it a home but day in clinic her blood pressure is high at 179/80. She was concerned about this but said the numbers at home were not this high.      Back in March the patient had a fall that sent her to the emergency department. She was seen in April at our clinic where we reviewed her emergency room visit, see note for details. Today, her knee and hip are still bugging her. She still has pain running down her leg as a result of the hip pain. She had an x-ray of the hip in the hospital which was normal but no x-ray of the knee. The patient will see Dr. Mccabe in orthopedics in 3 weeks for evaluation of this pain.     Other concerns discussed:  1. Quantiferon Gold - negative  2. Colonoscopy - patient wondered if necessary anymore, she has no symptoms and no family history   3. Cardiologist - the patient's last cardiologist retired so she will be seeing a new cardiologist in a few months       Review of Systems:   Pertinent items are noted in HPI, remainder of complete ROS is negative.      Active Medications:     Current Outpatient Prescriptions:      allopurinol (ZYLOPRIM) 100 MG tablet, Take 1 tablet (100 mg) by mouth daily, Disp: 90 tablet, Rfl: 3     aspirin 81 MG tablet, Take 1 tablet by mouth daily., Disp: , Rfl:      clopidogrel (PLAVIX) 75 MG tablet, TAKE ONE TABLET BY MOUTH ONCE DAILY, Disp: 90 tablet, Rfl: 1     cyanocolbalamin (VITAMIN B-12) 1000 MCG tablet, Take 500 mcg by mouth daily As directed, Disp: , Rfl:      furosemide (LASIX) 40 MG tablet, Take 1 tablet (40 mg) by mouth daily, Disp:  90 tablet, Rfl: 3     isosorbide mononitrate (ISMO/MONOKET) 20 MG tablet, Take 2 tablets (40 mg) by mouth 3 times daily Please make a follow up appointment for further refills., Disp: 540 tablet, Rfl: 1     metoprolol (LOPRESSOR) 100 MG tablet, Take 1 tablet (100 mg) by mouth 2 times daily, Disp: 180 tablet, Rfl: 3     multivitamin  with lutein (OCUVITE WITH LTEIN) CAPS, Take 1 capsule by mouth daily, Disp: , Rfl:      NITROSTAT 0.4 MG sublingual tablet, DISSOLVE ONE TABLET UNDER THE TONGUE EVERY 5 MINUTES AS NEEDED FOR CHEST PAIN.  DO NOT EXCEED A TOTAL OF 3 DOSES IN 15 MINUTES. CALL 911., Disp: 25 tablet, Rfl: 3     omeprazole (PRILOSEC) 40 MG capsule, TAKE ONE CAPSULE BY MOUTH ONCE DAILY, Disp: 90 capsule, Rfl: 3     order for DME, Equipment being ordered: Left hand and wrist brace, Disp: 1 each, Rfl: 0     potassium chloride (K-TAB,KLOR-CON) 10 MEQ tablet, Take 2 tabs in AM, 1 tab in PM (Patient taking differently: daily Take 2 tabs), Disp: 270 tablet, Rfl: 3     pravastatin (PRAVACHOL) 80 MG tablet, TAKE ONE TABLET BY MOUTH ONCE DAILY, Disp: 90 tablet, Rfl: 3     timolol (TIMOPTIC) 0.5 % ophthalmic solution, Place 1 drop into both eyes daily , Disp: , Rfl:       Allergies:   Bactrim [sulfamethoxazole w/trimethoprim]; Biaxin [clarithromycin]; Chlorthalidone; Clonidine; Codeine sulfate; Darvon [propoxyphene hcl]; Dilaudid [hydromorphone]; Gabapentin; Indocin; Levaquin [levofloxacin hemihydrate]; Lyrica; Morphine sulfate; Percocet [oxycodone-acetaminophen]; Spironolactone; Terazosin; Ciprofloxacin; and Simvastatin      Past Medical History:  Arrhythmia  CAD  Cardiac pacemaker  Chronic kidney disease, stage IV  Degeneration of cervical intervertebral disc  Disturbance of skin sensation  Edema  Gout  Hyperlipidemia   Hypertension  Non allopathic lesion of cervical region  Non allopathic lesion of thoracici region  Non-ST elevated myocardial infarction  Obstructive sleep apnea  Osteomalacia  Post-menopausal  osteoporosis  Sinus node dysfunction  Stented coronary artery  Transient complete heart block     Past Surgical History:  Cholecystectomy  PPM insertion new/replacement with atrial and ventricular lead  Hysterectomy    Family History:   Bladder cancer - sister    Social History:   Tobacco Use: former smoker, 0.30 packs/day, 15 years  Alcohol Use: none  PCP: Pedro Gomez      Physical Exam:   /80  Pulse 63  Wt 67.9 kg (149 lb 9.6 oz)  BMI 23.43 kg/m2   Constitutional: Alert. In no distress.  Head: Normocephalic. No masses, lesions, tenderness or abnormalities.  Cardiovascular: RRR. No murmurs, clicks, gallops, or rub.  Musculoskeletal: Extremities normal. No gross deformities noted. Normal muscle tone.  Neurologic: Gait normal. Reflexes normal and symmetric. Sensation grossly WNL.  Psychiatric: Mentation appears normal. Normal affect.      Assessment and Plan:  Routine general medical examination at a health care facility  The patient requested the zoster shot today.   - ZOSTER VACCINE RECOMBINANT ADJUVANTED IM NJX    Chronic ischemic heart disease  No symptoms, will see new cardiologist this fall.  - CBC with platelets differential  - Comprehensive metabolic panel  - Lipid panel reflex to direct LDL Fasting    Chronic Kidney Disease  Will recheck labs today, see nephrology in the fall.    Lower Extremity Pain   Seeing Dr. Mccabe in a few weeks.     Gout   No symptoms since last visit.     No more colon test    Htn: as is w/ normal home BP, continue taking those        Follow-up: Return in 6 months (around 12/20/2018).         Scribe Disclosure:   Henny DENNEY, am serving as a scribe to document services personally performed by Pedro Gomez MD at this visit, based upon the provider's statements to me. All documentation has been reviewed by the aforementioned provider prior to being entered into the official medical record.     Portions of this medical record were completed by a scribe. UPON  MY REVIEW AND AUTHENTICATION BY ELECTRONIC SIGNATURE, this confirms (a) I performed the applicable clinical services, and (b) the record is accurate.   Pedro Gomez MD

## 2018-06-25 DIAGNOSIS — N18.30 CKD (CHRONIC KIDNEY DISEASE) STAGE 3, GFR 30-59 ML/MIN (H): ICD-10-CM

## 2018-06-25 DIAGNOSIS — I25.9 CHRONIC ISCHEMIC HEART DISEASE: ICD-10-CM

## 2018-06-25 DIAGNOSIS — R25.2 CRAMP OF LIMB: ICD-10-CM

## 2018-06-25 DIAGNOSIS — E87.6 HYPOKALEMIA: ICD-10-CM

## 2018-06-25 LAB
BASOPHILS # BLD AUTO: 0.1 10E9/L (ref 0–0.2)
BASOPHILS NFR BLD AUTO: 0.8 %
DIFFERENTIAL METHOD BLD: ABNORMAL
EOSINOPHIL # BLD AUTO: 0.5 10E9/L (ref 0–0.7)
EOSINOPHIL NFR BLD AUTO: 5.7 %
ERYTHROCYTE [DISTWIDTH] IN BLOOD BY AUTOMATED COUNT: 15.9 % (ref 10–15)
HCT VFR BLD AUTO: 36.2 % (ref 35–47)
HGB BLD-MCNC: 11.5 G/DL (ref 11.7–15.7)
LYMPHOCYTES # BLD AUTO: 2.2 10E9/L (ref 0.8–5.3)
LYMPHOCYTES NFR BLD AUTO: 25.3 %
MCH RBC QN AUTO: 30.8 PG (ref 26.5–33)
MCHC RBC AUTO-ENTMCNC: 31.8 G/DL (ref 31.5–36.5)
MCV RBC AUTO: 97 FL (ref 78–100)
MONOCYTES # BLD AUTO: 1 10E9/L (ref 0–1.3)
MONOCYTES NFR BLD AUTO: 11.7 %
NEUTROPHILS # BLD AUTO: 4.9 10E9/L (ref 1.6–8.3)
NEUTROPHILS NFR BLD AUTO: 56.5 %
PLATELET # BLD AUTO: 179 10E9/L (ref 150–450)
RBC # BLD AUTO: 3.73 10E12/L (ref 3.8–5.2)
WBC # BLD AUTO: 8.6 10E9/L (ref 4–11)

## 2018-06-25 PROCEDURE — 36415 COLL VENOUS BLD VENIPUNCTURE: CPT | Performed by: FAMILY MEDICINE

## 2018-06-25 PROCEDURE — 80053 COMPREHEN METABOLIC PANEL: CPT | Performed by: FAMILY MEDICINE

## 2018-06-25 PROCEDURE — 80061 LIPID PANEL: CPT | Performed by: FAMILY MEDICINE

## 2018-06-25 PROCEDURE — 85025 COMPLETE CBC W/AUTO DIFF WBC: CPT | Performed by: FAMILY MEDICINE

## 2018-06-25 PROCEDURE — 84100 ASSAY OF PHOSPHORUS: CPT | Performed by: FAMILY MEDICINE

## 2018-06-26 LAB
ALBUMIN SERPL-MCNC: 3.7 G/DL (ref 3.4–5)
ALP SERPL-CCNC: 104 U/L (ref 40–150)
ALT SERPL W P-5'-P-CCNC: 14 U/L (ref 0–50)
ANION GAP SERPL CALCULATED.3IONS-SCNC: 12 MMOL/L (ref 3–14)
AST SERPL W P-5'-P-CCNC: 15 U/L (ref 0–45)
BILIRUB SERPL-MCNC: 0.4 MG/DL (ref 0.2–1.3)
BUN SERPL-MCNC: 48 MG/DL (ref 7–30)
CALCIUM SERPL-MCNC: 8.8 MG/DL (ref 8.5–10.1)
CHLORIDE SERPL-SCNC: 108 MMOL/L (ref 94–109)
CHOLEST SERPL-MCNC: 144 MG/DL
CO2 SERPL-SCNC: 25 MMOL/L (ref 20–32)
CREAT SERPL-MCNC: 1.75 MG/DL (ref 0.52–1.04)
GFR SERPL CREATININE-BSD FRML MDRD: 28 ML/MIN/1.7M2
GLUCOSE SERPL-MCNC: 78 MG/DL (ref 70–99)
HDLC SERPL-MCNC: 47 MG/DL
LDLC SERPL CALC-MCNC: 70 MG/DL
NONHDLC SERPL-MCNC: 97 MG/DL
PHOSPHATE SERPL-MCNC: 3.6 MG/DL (ref 2.5–4.5)
POTASSIUM SERPL-SCNC: 4.4 MMOL/L (ref 3.4–5.3)
PROT SERPL-MCNC: 7.3 G/DL (ref 6.8–8.8)
SODIUM SERPL-SCNC: 145 MMOL/L (ref 133–144)
TRIGL SERPL-MCNC: 136 MG/DL

## 2018-07-05 DIAGNOSIS — I10 ESSENTIAL HYPERTENSION: ICD-10-CM

## 2018-07-05 RX ORDER — FUROSEMIDE 40 MG
TABLET ORAL
Qty: 90 TABLET | Refills: 3 | Status: SHIPPED | OUTPATIENT
Start: 2018-07-05 | End: 2019-04-17

## 2018-07-05 NOTE — TELEPHONE ENCOUNTER
Last Office Visit with Nephrologist:  11/8/2017.  Medication refilled per Nephrology Clinic protocol.    Jared Marquez RN

## 2018-07-12 DIAGNOSIS — I21.4 NSTEMI (NON-ST ELEVATED MYOCARDIAL INFARCTION) (H): ICD-10-CM

## 2018-07-12 DIAGNOSIS — Z95.820 S/P ANGIOPLASTY WITH STENT: ICD-10-CM

## 2018-07-16 NOTE — TELEPHONE ENCOUNTER
CLOPIDOGREL 75MG TAB     Last Written Prescription Date:  12/27/2017  Last Fill Quantity: 90,   # refills: 1  Last Office Visit : 6/20/2018  Future Office visit: 12/19/2018    Routing refill request to provider for review/approval because:  HGB 11.2 on 6/25/2018 fails protocol

## 2018-07-17 RX ORDER — CLOPIDOGREL BISULFATE 75 MG/1
75 TABLET ORAL DAILY
Qty: 90 TABLET | Refills: 1 | Status: SHIPPED | OUTPATIENT
Start: 2018-07-17 | End: 2019-01-12

## 2018-08-11 DIAGNOSIS — I10 ESSENTIAL HYPERTENSION: Primary | ICD-10-CM

## 2018-08-11 DIAGNOSIS — N18.30 CKD (CHRONIC KIDNEY DISEASE) STAGE 3, GFR 30-59 ML/MIN (H): ICD-10-CM

## 2018-08-11 DIAGNOSIS — R60.9 EDEMA: ICD-10-CM

## 2018-08-13 ENCOUNTER — TELEPHONE (OUTPATIENT)
Dept: NEPHROLOGY | Facility: CLINIC | Age: 83
End: 2018-08-13

## 2018-08-15 ENCOUNTER — OFFICE VISIT (OUTPATIENT)
Dept: NEPHROLOGY | Facility: CLINIC | Age: 83
End: 2018-08-15
Attending: INTERNAL MEDICINE
Payer: COMMERCIAL

## 2018-08-15 VITALS
BODY MASS INDEX: 23.56 KG/M2 | HEART RATE: 61 BPM | WEIGHT: 150.4 LBS | OXYGEN SATURATION: 99 % | SYSTOLIC BLOOD PRESSURE: 167 MMHG | TEMPERATURE: 98.6 F | DIASTOLIC BLOOD PRESSURE: 84 MMHG

## 2018-08-15 DIAGNOSIS — N18.30 CKD (CHRONIC KIDNEY DISEASE) STAGE 3, GFR 30-59 ML/MIN (H): ICD-10-CM

## 2018-08-15 DIAGNOSIS — N18.4 CKD (CHRONIC KIDNEY DISEASE) STAGE 4, GFR 15-29 ML/MIN (H): ICD-10-CM

## 2018-08-15 DIAGNOSIS — R60.9 EDEMA: ICD-10-CM

## 2018-08-15 DIAGNOSIS — I10 ESSENTIAL HYPERTENSION: Primary | ICD-10-CM

## 2018-08-15 DIAGNOSIS — I10 ESSENTIAL HYPERTENSION: ICD-10-CM

## 2018-08-15 DIAGNOSIS — Z91.81 RISK FOR FALLS: ICD-10-CM

## 2018-08-15 LAB
ALBUMIN SERPL-MCNC: 3.7 G/DL (ref 3.4–5)
ANION GAP SERPL CALCULATED.3IONS-SCNC: 7 MMOL/L (ref 3–14)
BUN SERPL-MCNC: 44 MG/DL (ref 7–30)
CALCIUM SERPL-MCNC: 8.9 MG/DL (ref 8.5–10.1)
CHLORIDE SERPL-SCNC: 109 MMOL/L (ref 94–109)
CO2 SERPL-SCNC: 26 MMOL/L (ref 20–32)
CREAT SERPL-MCNC: 1.77 MG/DL (ref 0.52–1.04)
CREAT UR-MCNC: 14 MG/DL
GFR SERPL CREATININE-BSD FRML MDRD: 28 ML/MIN/1.7M2
GLUCOSE SERPL-MCNC: 82 MG/DL (ref 70–99)
HGB BLD-MCNC: 12.4 G/DL (ref 11.7–15.7)
PHOSPHATE SERPL-MCNC: 3 MG/DL (ref 2.5–4.5)
POTASSIUM SERPL-SCNC: 4.5 MMOL/L (ref 3.4–5.3)
PROT UR-MCNC: 0.05 G/L
PROT/CREAT 24H UR: 0.38 G/G CR (ref 0–0.2)
SODIUM SERPL-SCNC: 142 MMOL/L (ref 133–144)

## 2018-08-15 PROCEDURE — G0463 HOSPITAL OUTPT CLINIC VISIT: HCPCS | Mod: ZF

## 2018-08-15 PROCEDURE — 84156 ASSAY OF PROTEIN URINE: CPT | Performed by: INTERNAL MEDICINE

## 2018-08-15 PROCEDURE — 85018 HEMOGLOBIN: CPT | Performed by: INTERNAL MEDICINE

## 2018-08-15 PROCEDURE — 80069 RENAL FUNCTION PANEL: CPT | Performed by: INTERNAL MEDICINE

## 2018-08-15 PROCEDURE — 36415 COLL VENOUS BLD VENIPUNCTURE: CPT | Performed by: INTERNAL MEDICINE

## 2018-08-15 RX ORDER — AMLODIPINE BESYLATE 2.5 MG/1
2.5 TABLET ORAL DAILY
Qty: 30 TABLET | Refills: 11 | Status: SHIPPED | OUTPATIENT
Start: 2018-08-15 | End: 2020-08-10

## 2018-08-15 ASSESSMENT — PAIN SCALES - GENERAL: PAINLEVEL: NO PAIN (0)

## 2018-08-15 NOTE — MR AVS SNAPSHOT
After Visit Summary   8/15/2018    Tessa Mansfield    MRN: [id number]           Patient Information     Date Of Birth          1935        Visit Information        Provider Department      8/15/2018 10:20 AM Marzena Martin MD [facility]        Today's Diagnoses     Essential hypertension    -  1    CKD (chronic kidney disease) stage 4, GFR 15-29 ml/min (H)           Follow-ups after your visit        Follow-up notes from your care team     Return in about 8 months (around 4/15/2019).      Your next 10 appointments already scheduled     Nov 13, 2018 10:00 AM CST   (Arrive by 9:45 AM)   Pacemaker Check with  Cv Device 1   [facility] ([facility])    [address]  [address]  [facility] [id number] [phone]            Nov 13, 2018 10:30 AM CST   (Arrive by 10:15 AM)   NEW ARRHYTHMIA with Max Edwards MD   [facility] ([facility])    [address]  [address]  [facility] [address] [phone]            Nov 13, 2018 12:00 PM CST   (Arrive by 11:45 AM)   Return Visit with Tyrell Santoyo MD   [facility] ([facility])    [address]  [address]  [facility] [address] [phone]            Dec 19, 2018 12:50 PM CST   (Arrive by 12:35 PM)   Return Visit with Pedro Gomez MD   [facility] ([facility])    [address]  4th Floor  [facility] [address] [phone]            Apr 17, 2019  9:30 AM CDT   Lab with UC LAB   [facility] ([facility])    [address] [place] [address] [address]  1st Floor  [facility] [address] [phone]            Apr 17, 2019 10:20 AM CDT   (Arrive by 9:50 AM)   Return Visit with Marzena Martin MD   [facility] ([facility])    [address]  [address]  [facility] [address]    [phone]              Future tests that were ordered for you today     Open Future Orders        Priority Expected Expires Ordered    Renal panel Routine  8/15/2019 8/15/2018            Who to contact     If you have questions or need follow up information about today's clinic visit or your schedule please contact [facility] directly at [phone].  Normal or non-critical lab and imaging results will be communicated to you by MyChart, letter or phone within 4 business days after the clinic has received the results. If you do not hear from us within 7 days, please contact the clinic through MyChart or phone. If you have a critical or abnormal lab result, we will notify you by phone as soon as possible.  Submit refill requests through [employer] or call your pharmacy and they will forward the refill request to us. Please allow 3 business days for your refill to be completed.          Additional Information About Your Visit        Care EveryWhere ID     This is your Care EveryWhere ID. This could be used by other organizations to access your [place] medical records  [id number]        Your Vitals Were     Pulse Temperature Pulse Oximetry BMI (Body Mass Index)          61 98.6  F (37  C) (Oral) 99% 23.56 kg/m2         Blood Pressure from Last 3 Encounters:   08/15/18 167/84   06/20/18 179/80   04/12/18 128/75    Weight from Last 3 Encounters:   08/15/18 68.2 kg (150 lb 6.4 oz)   06/20/18 67.9 kg (149 lb 9.6 oz)   04/12/18 65.2 kg (143 lb 11.2 oz)                 Today's Medication Changes          These changes are accurate as of 8/15/18 10:50 AM.  If you have any questions, ask your nurse or doctor.               Start taking these medicines.        Dose/Directions    amLODIPine 2.5 MG tablet   Commonly known as:  NORVASC   Used for:  Essential hypertension   Started by:  Marzena Martin MD        Dose:  2.5 mg   Take 1 tablet (2.5 mg) by mouth daily   Quantity:  30 tablet   Refills:  11          These medicines have changed or have updated prescriptions.        Dose/Directions    potassium chloride 10 MEQ tablet   Commonly known as:  K-TAB,KLOR-CON   This may have changed:    - when to take this  - additional instructions   Used for:  Hypokalemia        Take 2 tabs in AM, 1 tab in PM   Quantity:  270 tablet   Refills:  3            Where to get your medicines      These medications were sent to [facility] - [place], [place] - [address] [place] [address] [place]  [address] [place], [place] [address], [place], [place] [address]     Phone:  [phone]     amLODIPine 2.5 MG tablet                Primary Care Provider Office Phone # Fax #    Pedro Gomez [phone]       [address] [address]        Equal Access to Services     [facility] : Hadii marcela salinaso Sofelecia, waaxda luqadaha, qaybta kaalmada adeegyada, [place] . So [facility] [phone].    ATENCIÓN: Si habla español, tiene a braga disposición servicios gratuitos de asistencia lingüística. [facility] [phone].    We comply with applicable federal civil rights laws and [place] laws. We do not discriminate on the basis of race, color, national origin, age, disability, sex, sexual orientation, or gender identity.            Thank you!     Thank you for choosing [facility]  for your care. Our goal is always to provide you with excellent care. Hearing back from our patients is one way we can continue to improve our services. Please take a few minutes to complete the written survey that you may receive in the mail after your visit with us. Thank you!             Your Updated Medication List - Protect others around you: Learn how to safely use, store and throw away your medicines at www.disposemymeds.org.          This list is accurate as of 8/15/18 10:50 AM.  Always use your most recent med list.                   Brand Name Dispense Instructions for use Diagnosis    allopurinol 100 MG  tablet    ZYLOPRIM    90 tablet    Take 1 tablet (100 mg) by mouth daily    Gout       amLODIPine 2.5 MG tablet    NORVASC    30 tablet    Take 1 tablet (2.5 mg) by mouth daily    Essential hypertension       aspirin 81 MG tablet      Take 1 tablet by mouth daily.        clopidogrel 75 MG tablet    PLAVIX    90 tablet    Take 1 tablet (75 mg) by mouth daily    NSTEMI (non-ST elevated myocardial infarction) (H), S/P angioplasty with stent       cyanocobalamin 1000 MCG tablet    vitamin  B-12     Take 500 mcg by mouth daily As directed        furosemide 40 MG tablet    LASIX    90 tablet    TAKE ONE TABLET BY MOUTH ONCE DAILY    Essential hypertension       isosorbide mononitrate 20 MG tablet    ISMO/MONOKET    540 tablet    Take 2 tablets (40 mg) by mouth 3 times daily Please make a follow up appointment for further refills.    Coronary artery disease involving native coronary artery of native heart without angina pectoris, Chest pain       metoprolol tartrate 100 MG tablet    LOPRESSOR    180 tablet    Take 1 tablet (100 mg) by mouth 2 times daily    Essential hypertension       multivitamin  with lutein Caps per capsule      Take 1 capsule by mouth daily        NITROSTAT 0.4 MG sublingual tablet   Generic drug:  nitroGLYcerin     25 tablet    DISSOLVE ONE TABLET UNDER THE TONGUE EVERY 5 MINUTES AS NEEDED FOR CHEST PAIN.  DO NOT EXCEED A TOTAL OF 3 DOSES IN 15 MINUTES. CALL 911.    CAD (coronary artery disease)       omeprazole 40 MG capsule    priLOSEC    90 capsule    TAKE ONE CAPSULE BY MOUTH ONCE DAILY    Esophageal reflux       order for DME     1 each    Equipment being ordered: Left hand and wrist brace    Pain of left hand, Fall from standing, initial encounter       potassium chloride 10 MEQ tablet    K-TAB,KLOR-CON    270 tablet    Take 2 tabs in AM, 1 tab in PM    Hypokalemia       pravastatin 80 MG tablet    PRAVACHOL    90 tablet    TAKE ONE TABLET BY MOUTH ONCE DAILY    Hyperlipidemia LDL goal <130        timolol 0.5 % ophthalmic solution    TIMOPTIC     Place 1 drop into both eyes daily

## 2018-08-15 NOTE — LETTER
8/15/2018      RE: Tessa Mansfield  [address]  [facility] [address]          Assessment and Plan:   82 year old female with history of long standing HTN, CAD s/p multiple stents, who presents for followup of CKD stage 3, baseline SCr 1.8-2.2 mg/dL without proteinuria.  1. CKD stage 3- baseline SCr 1.8-2.2mg/ dL, which is stable today at 2.36mg/dL  - labs in a few months to ensure stability  - Scr a little higher today but near range  - RTC 8months  2. Hypertension- has been higher, suspect due to hydralazine being stopped- she is on metoprolol, furosemide once a day and isosorbide. Will use low dose amlodipine as BPs are 150-160  - amlodipine can be titrated up for systolic <140-145, given having a fall, will not want to lower blood pressure too much  3. Anemia- mi4. Electrolytes acid/base- stable  5. Bone- recommend vitamin D with calcium for bone health      Assessment and plan was discussed with patient and she voiced her understanding and agreement.    Reason for Visit:  Tessa Mansfield is a 82 year old female with HTN, who presents for CKD management.     HPI:  She is a pleasant female previously seen by Dr Bowman for CKD stage 3 presumed secondary to hypertensive nephrosclerosis  She has history of CAD s/p multiple stents by Dr Mir (7 stents in all) since 2004 and a pacemaker in 2007. She has had CAD for many yeare 3s, which has been relatively stable, ranging from 1.8-2.2 mg/dL over the  Years. She has been well , without major illness, no hospitalizations in last year.  She used to see Dr Mir but now will see Dr Santoyo for cardiac followup.  She denies GI or  issues. Her appetite is good and her  'takes care of her'- he does most of the cooking  She denies dyspnea, can be fairly active though admits to being lazy.    Her blood pressure is 150-160 at home  She fell and shattered her right hip and had hip replacement and has had a difficult recovery. She was going down stairs.     Home  "BP: 130    Baseline Cr: 1.8-2.2    ROS:  A comprehensive review of systems was obtained and negative, except as noted in the HPI or PMH.    Active Medical Problems:  Patient Active Problem List   Diagnosis     Degeneration of cervical intervertebral disc     Nonallopathic lesion of cervical region     Nonallopathic lesion of thoracic region     Coronary artery disease     Dizziness and giddiness     Edema     Esophageal reflux     Gout     Essential hypertension     Obstructive sleep apnea     Osteomalacia     Post-menopausal osteoporosis     Cardiac Pacemaker- Medtronic, dual chamber- NOT dependant     Transient complete heart block (H)     Sinus node dysfunction (H)     Disturbance of skin sensation     NSTEMI (non-ST elevated myocardial infarction) (H)     Arrhythmia     Chest pain       Personal Hx:   Social History     Social History     Marital status:      Spouse name: N/A     Number of children: 4     Years of education: N/A     Occupational History      [employer]     Social History Main Topics     Smoking status: Former Smoker     Packs/day: 0.30     Years: 15.00     Types: Cigarettes     Smokeless tobacco: Never Used      Comment: Quit 22+ years ago     Alcohol use Not on file     Drug use: No     Sexual activity: Not Currently     Partners: Male     Birth control/ protection: Post-menopausal     Other Topics Concern     Blood Transfusions Yes     Exercise No     Social History Narrative       Allergies:  Allergies   Allergen Reactions     Bactrim [Sulfamethoxazole W/Trimethoprim] Other (See Comments)     Patient unable to recall     Biaxin [Clarithromycin]      Chlorthalidone Nausea and Vomiting     Clonidine      Codeine Sulfate Itching     Darvon [Propoxyphene Hcl]      Dilaudid [Hydromorphone] Visual Disturbance     Hallucinations       Gabapentin Other (See Comments) and Fatigue     \"felt drunk\"     Indocin      Levaquin [Levofloxacin Hemihydrate]      Lyrica Fatigue     Morphine " Sulfate      Percocet [Oxycodone-Acetaminophen]      Spironolactone Other (See Comments)     Dehydrated       Terazosin      Ciprofloxacin Rash     Simvastatin Palpitations     Muscle weakness, leg cramping         Medications:  Prior to Admission medications    Medication Sig Start Date End Date Taking? Authorizing Provider   omeprazole (PRILOSEC) 40 MG capsule TAKE ONE CAPSULE BY MOUTH ONCE DAILY 11/6/17  Yes Aishwarya Mir MD   hydrALAZINE (APRESOLINE) 100 MG TABS tablet Take 1 tablet (100 mg) by mouth 3 times daily 10/16/17  Yes Pedro Gomez MD   pravastatin (PRAVACHOL) 80 MG tablet TAKE ONE TABLET BY MOUTH ONCE DAILY 9/26/17  Yes Aishwarya Mir MD   allopurinol (ZYLOPRIM) 100 MG tablet Take 1 tablet (100 mg) by mouth daily 5/25/17  Yes Pedro Gomez MD   furosemide (LASIX) 40 MG tablet Take 1 tablet (40 mg) by mouth daily 5/24/17  Yes Marzena Martin MD   NITROSTAT 0.4 MG sublingual tablet DISSOLVE ONE TABLET UNDER THE TONGUE EVERY 5 MINUTES AS NEEDED FOR CHEST PAIN.  DO NOT EXCEED A TOTAL OF 3 DOSES IN 15 MINUTES. CALL 911. 4/10/17  Yes Aishwarya Mir MD   isosorbide mononitrate (ISMO/MONOKET) 20 MG tablet Take 2 tablets (40 mg) by mouth 3 times daily 3/30/17  Yes Aishwarya Mir MD   potassium chloride (K-TAB,KLOR-CON) 10 MEQ tablet Take 1 tablet (10 mEq) by mouth 2 times daily 3/29/17  Yes Marzena Martin MD   clopidogrel (PLAVIX) 75 MG tablet Take 1 tablet (75 mg) by mouth daily 3/21/17  Yes Pedro Gomez MD   order for DME Equipment being ordered: Left hand and wrist brace 1/6/17  Yes Engelmann, Lauren Anneliese, MD   itraconazole (SPORANOX) 100 MG capsule Take 2 capsules (200 mg) by mouth daily 12/15/16  Yes Pedro Gomez MD   metoprolol (LOPRESSOR) 100 MG tablet TAKE ONE TABLET BY MOUTH TWICE DAILY 12/13/16  Yes Aishwarya Mir MD   timolol (TIMOPTIC) 0.5 % ophthalmic solution  11/23/15  Yes Reported, Patient   multivitamin   with lutein (OCUVITE WITH LTEIN) CAPS Take 1 capsule by mouth daily   Yes Unknown, Entered By History   aspirin 81 MG tablet Take 1 tablet by mouth daily.   Yes Reported, Patient   cyanocolbalamin (VITAMIN B-12) 1000 MCG tablet Take 1 tablet by mouth daily. As directed   Yes Reported, Patient       Vitals:  /84 (BP Location: Left arm)  Pulse 61  Temp 98.6  F (37  C) (Oral)  Wt 68.2 kg (150 lb 6.4 oz)  SpO2 99%  BMI 23.56 kg/m2    Exam:  GENERAL APPEARANCE: alert and no distress  HENT: mouth without ulcers or lesions  LYMPHATICS: no cervical or supraclavicular nodes  RESP: lungs clear to auscultation - no rales, rhonchi or wheezes  CV: regular rhythm, normal rate, no rub, no murmur  EDEMA: no LE edema bilaterally  ABDOMEN: soft, nondistended, nontender, bowel sounds normal  MS: extremities normal - no gross deformities noted, no evidence of inflammation in joints, no muscle tenderness  SKIN: no rash    Results:  Last Basic Metabolic Panel:  Lab Results   Component Value Date     11/08/2017      Lab Results   Component Value Date    POTASSIUM 3.5 11/08/2017     Lab Results   Component Value Date    CHLORIDE 104 11/08/2017     Lab Results   Component Value Date    ALEXANDRO 8.8 11/08/2017     Lab Results   Component Value Date    CO2 26 11/08/2017     Lab Results   Component Value Date    BUN 54 11/08/2017     Lab Results   Component Value Date    CR 2.36 11/08/2017     Lab Results   Component Value Date    GLC 88 11/08/2017       Marzena Martin MD   of Medicine  Department of Nephrology  [facility]

## 2018-08-15 NOTE — NURSING NOTE
Chief Complaint   Patient presents with     RECHECK     follow up 9 months     /84 (BP Location: Left arm)  Pulse 61  Temp 98.6  F (37  C) (Oral)  Wt 68.2 kg (150 lb 6.4 oz)  SpO2 99%  BMI 23.56 kg/m2   Yareli Barrera

## 2018-08-15 NOTE — PROGRESS NOTES
Assessment and Plan:   82 year old female with history of long standing HTN, CAD s/p multiple stents, who presents for followup of CKD stage 3, baseline SCr 1.8-2.2 mg/dL without proteinuria.  1. CKD stage 3- baseline SCr 1.8-2.2mg/ dL, which is stable today at 2.36mg/dL  - labs in a few months to ensure stability  - Scr a little higher today but near range  - RTC 8months  2. Hypertension- has been higher, suspect due to hydralazine being stopped- she is on metoprolol, furosemide once a day and isosorbide. Will use low dose amlodipine as BPs are 150-160  - amlodipine can be titrated up for systolic <140-145, given having a fall, will not want to lower blood pressure too much  3. Anemia- mi4. Electrolytes acid/base- stable  5. Bone- recommend vitamin D with calcium for bone health      Assessment and plan was discussed with patient and she voiced her understanding and agreement.    Reason for Visit:  Tessa Mansfield is a 82 year old female with HTN, who presents for CKD management.     HPI:  She is a pleasant female previously seen by Dr Bowman for CKD stage 3 presumed secondary to hypertensive nephrosclerosis  She has history of CAD s/p multiple stents by Dr Mir (7 stents in all) since 2004 and a pacemaker in 2007. She has had CAD for many yeare 3s, which has been relatively stable, ranging from 1.8-2.2 mg/dL over the  Years. She has been well , without major illness, no hospitalizations in last year.  She used to see Dr Mir but now will see Dr Santoyo for cardiac followup.  She denies GI or  issues. Her appetite is good and her  'takes care of her'- he does most of the cooking  She denies dyspnea, can be fairly active though admits to being lazy.    Her blood pressure is 150-160 at home  She fell and shattered her right hip and had hip replacement and has had a difficult recovery. She was going down stairs.     Home BP: 130    Baseline Cr: 1.8-2.2    ROS:  A comprehensive review of systems was  "obtained and negative, except as noted in the HPI or PMH.    Active Medical Problems:  Patient Active Problem List   Diagnosis     Degeneration of cervical intervertebral disc     Nonallopathic lesion of cervical region     Nonallopathic lesion of thoracic region     Coronary artery disease     Dizziness and giddiness     Edema     Esophageal reflux     Gout     Essential hypertension     Obstructive sleep apnea     Osteomalacia     Post-menopausal osteoporosis     Cardiac Pacemaker- Medtronic, dual chamber- NOT dependant     Transient complete heart block (H)     Sinus node dysfunction (H)     Disturbance of skin sensation     NSTEMI (non-ST elevated myocardial infarction) (H)     Arrhythmia     Chest pain       Personal Hx:   Social History     Social History     Marital status:      Spouse name: N/A     Number of children: 4     Years of education: N/A     Occupational History      [employer]     Social History Main Topics     Smoking status: Former Smoker     Packs/day: 0.30     Years: 15.00     Types: Cigarettes     Smokeless tobacco: Never Used      Comment: Quit 22+ years ago     Alcohol use Not on file     Drug use: No     Sexual activity: Not Currently     Partners: Male     Birth control/ protection: Post-menopausal     Other Topics Concern     Blood Transfusions Yes     Exercise No     Social History Narrative       Allergies:  Allergies   Allergen Reactions     Bactrim [Sulfamethoxazole W/Trimethoprim] Other (See Comments)     Patient unable to recall     Biaxin [Clarithromycin]      Chlorthalidone Nausea and Vomiting     Clonidine      Codeine Sulfate Itching     Darvon [Propoxyphene Hcl]      Dilaudid [Hydromorphone] Visual Disturbance     Hallucinations       Gabapentin Other (See Comments) and Fatigue     \"felt drunk\"     Indocin      Levaquin [Levofloxacin Hemihydrate]      Lyrica Fatigue     Morphine Sulfate      Percocet [Oxycodone-Acetaminophen]      Spironolactone Other (See " Comments)     Dehydrated       Terazosin      Ciprofloxacin Rash     Simvastatin Palpitations     Muscle weakness, leg cramping         Medications:  Prior to Admission medications    Medication Sig Start Date End Date Taking? Authorizing Provider   omeprazole (PRILOSEC) 40 MG capsule TAKE ONE CAPSULE BY MOUTH ONCE DAILY 11/6/17  Yes Aishwarya Mir MD   hydrALAZINE (APRESOLINE) 100 MG TABS tablet Take 1 tablet (100 mg) by mouth 3 times daily 10/16/17  Yes Pedro Gomez MD   pravastatin (PRAVACHOL) 80 MG tablet TAKE ONE TABLET BY MOUTH ONCE DAILY 9/26/17  Yes Aishwarya Mir MD   allopurinol (ZYLOPRIM) 100 MG tablet Take 1 tablet (100 mg) by mouth daily 5/25/17  Yes Pedro Gomez MD   furosemide (LASIX) 40 MG tablet Take 1 tablet (40 mg) by mouth daily 5/24/17  Yes Marzena Martin MD   NITROSTAT 0.4 MG sublingual tablet DISSOLVE ONE TABLET UNDER THE TONGUE EVERY 5 MINUTES AS NEEDED FOR CHEST PAIN.  DO NOT EXCEED A TOTAL OF 3 DOSES IN 15 MINUTES. CALL 911. 4/10/17  Yes Aishwarya Mir MD   isosorbide mononitrate (ISMO/MONOKET) 20 MG tablet Take 2 tablets (40 mg) by mouth 3 times daily 3/30/17  Yes Aishwarya Mir MD   potassium chloride (K-TAB,KLOR-CON) 10 MEQ tablet Take 1 tablet (10 mEq) by mouth 2 times daily 3/29/17  Yes Marzena Martin MD   clopidogrel (PLAVIX) 75 MG tablet Take 1 tablet (75 mg) by mouth daily 3/21/17  Yes Pedro Gomez MD   order for DME Equipment being ordered: Left hand and wrist brace 1/6/17  Yes Engelmann, Lauren Anneliese, MD   itraconazole (SPORANOX) 100 MG capsule Take 2 capsules (200 mg) by mouth daily 12/15/16  Yes Pedro Gomez MD   metoprolol (LOPRESSOR) 100 MG tablet TAKE ONE TABLET BY MOUTH TWICE DAILY 12/13/16  Yes Aishwarya Mir MD   timolol (TIMOPTIC) 0.5 % ophthalmic solution  11/23/15  Yes Reported, Patient   multivitamin  with lutein (OCUVITE WITH LTEIN) CAPS Take 1 capsule by mouth daily   Yes  Unknown, Entered By History   aspirin 81 MG tablet Take 1 tablet by mouth daily.   Yes Reported, Patient   cyanocolbalamin (VITAMIN B-12) 1000 MCG tablet Take 1 tablet by mouth daily. As directed   Yes Reported, Patient       Vitals:  /84 (BP Location: Left arm)  Pulse 61  Temp 98.6  F (37  C) (Oral)  Wt 68.2 kg (150 lb 6.4 oz)  SpO2 99%  BMI 23.56 kg/m2    Exam:  GENERAL APPEARANCE: alert and no distress  HENT: mouth without ulcers or lesions  LYMPHATICS: no cervical or supraclavicular nodes  RESP: lungs clear to auscultation - no rales, rhonchi or wheezes  CV: regular rhythm, normal rate, no rub, no murmur  EDEMA: no LE edema bilaterally  ABDOMEN: soft, nondistended, nontender, bowel sounds normal  MS: extremities normal - no gross deformities noted, no evidence of inflammation in joints, no muscle tenderness  SKIN: no rash    Results:  Last Basic Metabolic Panel:  Lab Results   Component Value Date     11/08/2017      Lab Results   Component Value Date    POTASSIUM 3.5 11/08/2017     Lab Results   Component Value Date    CHLORIDE 104 11/08/2017     Lab Results   Component Value Date    ALEXANDRO 8.8 11/08/2017     Lab Results   Component Value Date    CO2 26 11/08/2017     Lab Results   Component Value Date    BUN 54 11/08/2017     Lab Results   Component Value Date    CR 2.36 11/08/2017     Lab Results   Component Value Date    GLC 88 11/08/2017                 Marzena Martin MD   of Medicine  Department of Nephrology  [facility]

## 2018-08-18 ENCOUNTER — HEALTH MAINTENANCE LETTER (OUTPATIENT)
Age: 83
End: 2018-08-18

## 2018-08-21 ENCOUNTER — ALLIED HEALTH/NURSE VISIT (OUTPATIENT)
Dept: CARDIOLOGY | Facility: CLINIC | Age: 83
End: 2018-08-21
Attending: INTERNAL MEDICINE
Payer: COMMERCIAL

## 2018-08-21 DIAGNOSIS — I49.5 SINUS NODE DYSFUNCTION (H): Primary | ICD-10-CM

## 2018-08-21 PROCEDURE — 93296 REM INTERROG EVL PM/IDS: CPT | Mod: ZF

## 2018-08-21 NOTE — MR AVS SNAPSHOT
After Visit Summary   8/21/2018    Tessa Mansfield    MRN: [id number]           Patient Information     Date Of Birth          1935        Visit Information        Provider Department      8/21/2018 6:00 AM  ICD REMOTE [facility]        Today's Diagnoses     Sinus node dysfunction (H)    -  1       Follow-ups after your visit        Your next 10 appointments already scheduled     Nov 13, 2018 10:00 AM CST   (Arrive by 9:45 AM)   Pacemaker Check with  Cv Device 1   [facility] ([facility])    [address] [place] [address]  3rd Floor  [address]               Nov 13, 2018 10:30 AM CST   (Arrive by 10:15 AM)   NEW ARRHYTHMIA with Max Edwards MD   [facility] ([facility])    [address] [place] [address]  [address]  [facility] [id number] [phone]            Nov 13, 2018 12:00 PM CST   (Arrive by 11:45 AM)   Return Visit with Tyrell Santoyo MD   [facility] ([facility])    [address] [place] [address]  [address]  [facility] [id number] [phone]            Dec 19, 2018 12:50 PM CST   (Arrive by 12:35 PM)   Return Visit with Pedro Gomez MD   [facility] ([facility])    [address] [place] [address]  4th Floor  [facility] [id number] [phone]            Apr 17, 2019  9:30 AM CDT   Lab with  LAB   [facility] ([facility])    [address] [place] [address]  1st Floor  [facility] [id number] [phone]            Apr 17, 2019 10:20 AM CDT   (Arrive by 9:50 AM)   Return Visit with Marzena Martin MD   [facility] ([facility])    [address] [place] [address]  [address]  [facility] [id number] [phone]              Who to contact     If you have questions or need follow up information about today's clinic visit or your schedule please contact [facility] directly at  [phone].  Normal or non-critical lab and imaging results will be communicated to you by MyChart, letter or phone within 4 business days after the clinic has received the results. If you do not hear from us within 7 days, please contact the clinic through MyChart or phone. If you have a critical or abnormal lab result, we will notify you by phone as soon as possible.  Submit refill requests through [employer] or call your pharmacy and they will forward the refill request to us. Please allow 3 business days for your refill to be completed.          Additional Information About Your Visit        Care EveryWhere ID     This is your Care EveryWhere ID. This could be used by other organizations to access your [facility] medical records  [id number]         Blood Pressure from Last 3 Encounters:   08/15/18 167/84   06/20/18 179/80   04/12/18 128/75    Weight from Last 3 Encounters:   08/15/18 68.2 kg (150 lb 6.4 oz)   06/20/18 67.9 kg (149 lb 9.6 oz)   04/12/18 65.2 kg (143 lb 11.2 oz)              We Performed the Following     INTERROGATION DEVICE EVAL REMOTE, PACER/ICD          Today's Medication Changes          These changes are accurate as of 8/21/18 11:59 PM.  If you have any questions, ask your nurse or doctor.               These medicines have changed or have updated prescriptions.        Dose/Directions    potassium chloride 10 MEQ tablet   Commonly known as:  K-TAB,KLOR-CON   This may have changed:    - when to take this  - additional instructions   Used for:  Hypokalemia        Take 2 tabs in AM, 1 tab in PM   Quantity:  270 tablet   Refills:  3                Primary Care Provider Office Phone # Fax #    Pedro Gomez [phone]       [address] [address]        Equal Access to Services     FAYE GUILLORY AH: Lis Gross, rohith smith, kelly franco, houston fisher. So [facility] [phone].    ATENCIÓN: Si  chika zuleta, tiene a braga disposición servicios gratuitos de asistencia lingüística. Thomas moore [phone].    We comply with applicable federal civil rights laws and [place] laws. We do not discriminate on the basis of race, color, national origin, age, disability, sex, sexual orientation, or gender identity.            Thank you!     Thank you for choosing [facility]  for your care. Our goal is always to provide you with excellent care. Hearing back from our patients is one way we can continue to improve our services. Please take a few minutes to complete the written survey that you may receive in the mail after your visit with us. Thank you!             Your Updated Medication List - Protect others around you: Learn how to safely use, store and throw away your medicines at www.disposemymeds.org.          This list is accurate as of 8/21/18 11:59 PM.  Always use your most recent med list.                   Brand Name Dispense Instructions for use Diagnosis    allopurinol 100 MG tablet    ZYLOPRIM    90 tablet    Take 1 tablet (100 mg) by mouth daily    Gout       amLODIPine 2.5 MG tablet    NORVASC    30 tablet    Take 1 tablet (2.5 mg) by mouth daily    Essential hypertension       aspirin 81 MG tablet      Take 1 tablet by mouth daily.        clopidogrel 75 MG tablet    PLAVIX    90 tablet    Take 1 tablet (75 mg) by mouth daily    NSTEMI (non-ST elevated myocardial infarction) (H), S/P angioplasty with stent       cyanocobalamin 1000 MCG tablet    vitamin  B-12     Take 500 mcg by mouth daily As directed        furosemide 40 MG tablet    LASIX    90 tablet    TAKE ONE TABLET BY MOUTH ONCE DAILY    Essential hypertension       isosorbide mononitrate 20 MG tablet    ISMO/MONOKET    540 tablet    Take 2 tablets (40 mg) by mouth 3 times daily Please make a follow up appointment for further refills.    Coronary artery disease involving native coronary artery of native heart without angina pectoris, Chest  pain       metoprolol tartrate 100 MG tablet    LOPRESSOR    180 tablet    Take 1 tablet (100 mg) by mouth 2 times daily    Essential hypertension       multivitamin  with lutein Caps per capsule      Take 1 capsule by mouth daily        NITROSTAT 0.4 MG sublingual tablet   Generic drug:  nitroGLYcerin     25 tablet    DISSOLVE ONE TABLET UNDER THE TONGUE EVERY 5 MINUTES AS NEEDED FOR CHEST PAIN.  DO NOT EXCEED A TOTAL OF 3 DOSES IN 15 MINUTES. CALL 911.    CAD (coronary artery disease)       omeprazole 40 MG capsule    priLOSEC    90 capsule    TAKE ONE CAPSULE BY MOUTH ONCE DAILY    Esophageal reflux       order for DME     1 each    Equipment being ordered: Left hand and wrist brace    Pain of left hand, Fall from standing, initial encounter       potassium chloride 10 MEQ tablet    K-TAB,KLOR-CON    270 tablet    Take 2 tabs in AM, 1 tab in PM    Hypokalemia       pravastatin 80 MG tablet    PRAVACHOL    90 tablet    TAKE ONE TABLET BY MOUTH ONCE DAILY    Hyperlipidemia LDL goal <130       timolol 0.5 % ophthalmic solution    TIMOPTIC     Place 1 drop into both eyes daily

## 2018-09-21 NOTE — PROGRESS NOTES
Assessment and Plan:   82 year old female with history of long standing HTN, CAD s/p multiple stents, who presents for followup of CKD stage 3, baseline SCr 1.8-2.2 mg/dL without proteinuria.  1. CKD stage 3- baseline SCr 1.8-2.2mg/ dL, which is stable today at 2.36mg/dL  - labs in 6 months to ensure stability  - Scr a little higher today but near range  2. Hypertension- well controlled, on hydralazine 100, furosemide, isosorbide, metoprolol     BP well controlled, would consider switch to carvedilol if BP higher than goal (<140/90)  3. Anemia- mild  4. Electrolytes acid/base- stable  5. Bone- recommend vitamin D with calcium for bone health      Assessment and plan was discussed with patient and she voiced her understanding and agreement.    Reason for Visit:  Tessa Mansfield is a 82 year old female with HTN, who presents for CKD management.     HPI:  She is a pleasant female previously seen by Dr Bowman for CKD stage 3 presumed secondary to hypertensive nephrosclerosis  She has history of CAD s/p multiple stents by Dr Mir (7 stents in all) since 2004 and a pacemaker in 2007. She has had CAD for many yeare 3s, which has been relatively stable, ranging from 1.8-2.2 mg/dL over the  Years. She has been well , without major illness, no hospitalizations in last year.  She will see Dr Mir in coming weeks for cardiac followup.  She denies GI or  issues. Her appetite is good and her  'takes care of her'- he does most of the cooking  She denies dyspnea, can be fairly active though admits to being lazy.      Home BP: 130    Baseline Cr: 1.8-2.2    ROS:  A comprehensive review of systems was obtained and negative, except as noted in the HPI or PMH.    Active Medical Problems:  Patient Active Problem List   Diagnosis     Degeneration of cervical intervertebral disc     Nonallopathic lesion of cervical region     Nonallopathic lesion of thoracic region     Coronary artery disease     Dizziness and giddiness      "Edema     Esophageal reflux     Gout     Essential hypertension     Obstructive sleep apnea     Osteomalacia     Post-menopausal osteoporosis     Cardiac Pacemaker- Medtronic, dual chamber- NOT dependant     Transient complete heart block (H)     Sinus node dysfunction (H)     Disturbance of skin sensation     NSTEMI (non-ST elevated myocardial infarction) (H)     Arrhythmia     Chest pain       Personal Hx:   Social History     Social History     Marital status:      Spouse name: N/A     Number of children: 4     Years of education: N/A     Occupational History      [employer]     Social History Main Topics     Smoking status: Former Smoker     Packs/day: 0.30     Years: 15.00     Types: Cigarettes     Smokeless tobacco: Never Used      Comment: Quit 22+ years ago     Alcohol use Not on file     Drug use: No     Sexual activity: Not Currently     Partners: Male     Birth control/ protection: Post-menopausal     Other Topics Concern     Blood Transfusions Yes     Exercise No     Social History Narrative       Allergies:  Allergies   Allergen Reactions     Bactrim [Sulfamethoxazole W/Trimethoprim] Other (See Comments)     Patient unable to recall     Biaxin [Clarithromycin]      Chlorthalidone Nausea and Vomiting     Clonidine      Codeine Sulfate Itching     Darvon [Propoxyphene Hcl]      Gabapentin Other (See Comments) and Fatigue     \"felt drunk\"     Indocin      Levaquin [Levofloxacin Hemihydrate]      Lyrica Fatigue     Morphine Sulfate      Percocet [Oxycodone-Acetaminophen]      Spironolactone Other (See Comments)     Dehydrated       Terazosin      Ciprofloxacin Rash     Simvastatin Palpitations     Muscle weakness, leg cramping         Medications:  Prior to Admission medications    Medication Sig Start Date End Date Taking? Authorizing Provider   omeprazole (PRILOSEC) 40 MG capsule TAKE ONE CAPSULE BY MOUTH ONCE DAILY 11/6/17  Yes Aishwarya Mir MD   hydrALAZINE (APRESOLINE) 100 " "MG TABS tablet Take 1 tablet (100 mg) by mouth 3 times daily 10/16/17  Yes Pedro Gomez MD   pravastatin (PRAVACHOL) 80 MG tablet TAKE ONE TABLET BY MOUTH ONCE DAILY 9/26/17  Yes Aishwarya Mir MD   allopurinol (ZYLOPRIM) 100 MG tablet Take 1 tablet (100 mg) by mouth daily 5/25/17  Yes Pedro Gomez MD   furosemide (LASIX) 40 MG tablet Take 1 tablet (40 mg) by mouth daily 5/24/17  Yes Marzena Martin MD   NITROSTAT 0.4 MG sublingual tablet DISSOLVE ONE TABLET UNDER THE TONGUE EVERY 5 MINUTES AS NEEDED FOR CHEST PAIN.  DO NOT EXCEED A TOTAL OF 3 DOSES IN 15 MINUTES. CALL 911. 4/10/17  Yes Aishwarya Mir MD   isosorbide mononitrate (ISMO/MONOKET) 20 MG tablet Take 2 tablets (40 mg) by mouth 3 times daily 3/30/17  Yes Aishwarya Mir MD   potassium chloride (K-TAB,KLOR-CON) 10 MEQ tablet Take 1 tablet (10 mEq) by mouth 2 times daily 3/29/17  Yes Marzena Martin MD   clopidogrel (PLAVIX) 75 MG tablet Take 1 tablet (75 mg) by mouth daily 3/21/17  Yes Pedro Gomez MD   order for DME Equipment being ordered: Left hand and wrist brace 1/6/17  Yes Engelmann, Lauren Anneliese, MD   itraconazole (SPORANOX) 100 MG capsule Take 2 capsules (200 mg) by mouth daily 12/15/16  Yes Pedro Gomez MD   metoprolol (LOPRESSOR) 100 MG tablet TAKE ONE TABLET BY MOUTH TWICE DAILY 12/13/16  Yes Aishwarya Mir MD   timolol (TIMOPTIC) 0.5 % ophthalmic solution  11/23/15  Yes Reported, Patient   multivitamin  with lutein (OCUVITE WITH LTEIN) CAPS Take 1 capsule by mouth daily   Yes Unknown, Entered By History   aspirin 81 MG tablet Take 1 tablet by mouth daily.   Yes Reported, Patient   cyanocolbalamin (VITAMIN B-12) 1000 MCG tablet Take 1 tablet by mouth daily. As directed   Yes Reported, Patient       Vitals:  /73  Pulse 62  Temp 97.8  F (36.6  C) (Oral)  Ht 1.651 m (5' 5\")  Wt 67.9 kg (149 lb 12.8 oz)  SpO2 98%  BMI 24.93 kg/m2    Exam:  GENERAL APPEARANCE: " alert and no distress  HENT: mouth without ulcers or lesions  LYMPHATICS: no cervical or supraclavicular nodes  RESP: lungs clear to auscultation - no rales, rhonchi or wheezes  CV: regular rhythm, normal rate, no rub, no murmur  EDEMA: no LE edema bilaterally  ABDOMEN: soft, nondistended, nontender, bowel sounds normal  MS: extremities normal - no gross deformities noted, no evidence of inflammation in joints, no muscle tenderness  SKIN: no rash    Results:  Last Basic Metabolic Panel:  Lab Results   Component Value Date     11/08/2017      Lab Results   Component Value Date    POTASSIUM 3.5 11/08/2017     Lab Results   Component Value Date    CHLORIDE 104 11/08/2017     Lab Results   Component Value Date    ALEXANDRO 8.8 11/08/2017     Lab Results   Component Value Date    CO2 26 11/08/2017     Lab Results   Component Value Date    BUN 54 11/08/2017     Lab Results   Component Value Date    CR 2.36 11/08/2017     Lab Results   Component Value Date    GLC 88 11/08/2017                    cigarettes

## 2018-10-02 NOTE — PROGRESS NOTES
Preliminary Device Interrogation Results.  Final physician signed paceart report to be scanned and attached.    Remote pacemaker transmission received and reviewed.  Device transmission sent per MD orders.  Patient has a Medtronic dual lead pacemaker.  Normal pacemaker function.  No arrhythmias recorded.  Presenting EGM = AP-VS @ 64 bpm.  AP = 88.1%.   = 0.2%.  Estimated battery longevity to LOLA = 27 months.  Patient notified of interrogation results.  Patient reports that she is feeling well and denies specific complaints.  Plan for patient to return to clinic in 3 months as scheduled.    Remote pacemaker transmission

## 2018-10-18 DIAGNOSIS — I21.4 NSTEMI (NON-ST ELEVATED MYOCARDIAL INFARCTION) (H): ICD-10-CM

## 2018-10-18 DIAGNOSIS — Z95.820 S/P ANGIOPLASTY WITH STENT: ICD-10-CM

## 2018-10-20 RX ORDER — CLOPIDOGREL BISULFATE 75 MG/1
TABLET ORAL
Qty: 90 TABLET | Refills: 1 | OUTPATIENT
Start: 2018-10-20

## 2018-10-24 ENCOUNTER — TELEPHONE (OUTPATIENT)
Dept: CARDIOLOGY | Facility: CLINIC | Age: 83
End: 2018-10-24

## 2018-10-24 DIAGNOSIS — E78.5 HYPERLIPIDEMIA LDL GOAL <130: ICD-10-CM

## 2018-10-24 RX ORDER — PRAVASTATIN SODIUM 80 MG/1
80 TABLET ORAL AT BEDTIME
Qty: 90 TABLET | Refills: 0 | Status: SHIPPED | OUTPATIENT
Start: 2018-10-24 | End: 2018-12-19

## 2018-10-24 NOTE — TELEPHONE ENCOUNTER
Called and spoke with Pt.  Informed her that 90 day nilay refill has been sent to the pharmacy.  Instructed Pt to have Dr Santoyo fill all cardiac medications under him at her upcoming visit.  Pt verbalized understanding, agreed to current plan and denied any further questions.    Sandra Rojas LPN

## 2018-10-24 NOTE — TELEPHONE ENCOUNTER
[facility] Call Center    Phone Message    May a detailed message be left on voicemail: yes    Reason for Call: Medication Refill Request    Has the patient contacted the pharmacy for the refill? Yes   Name of medication being requested: pravastatin (PRAVACHOL) 80 MG tablet  Provider who prescribed the medication: Dr. Mir  Pharmacy: [facility] [place]  Date medication is needed: ASAP, pt has been out of this for two weeks.        Action Taken: Message routed to:  [facility] ([facility]): Cardiology

## 2018-11-12 ENCOUNTER — PATIENT OUTREACH (OUTPATIENT)
Dept: CARE COORDINATION | Facility: CLINIC | Age: 83
End: 2018-11-12

## 2018-11-13 ENCOUNTER — OFFICE VISIT (OUTPATIENT)
Dept: CARDIOLOGY | Facility: CLINIC | Age: 83
End: 2018-11-13
Attending: INTERNAL MEDICINE
Payer: COMMERCIAL

## 2018-11-13 VITALS
HEIGHT: 67 IN | DIASTOLIC BLOOD PRESSURE: 78 MMHG | OXYGEN SATURATION: 100 % | HEART RATE: 68 BPM | SYSTOLIC BLOOD PRESSURE: 138 MMHG | BODY MASS INDEX: 25.24 KG/M2 | WEIGHT: 160.8 LBS

## 2018-11-13 VITALS
HEART RATE: 68 BPM | HEIGHT: 67 IN | OXYGEN SATURATION: 100 % | BODY MASS INDEX: 25.24 KG/M2 | DIASTOLIC BLOOD PRESSURE: 77 MMHG | SYSTOLIC BLOOD PRESSURE: 154 MMHG | WEIGHT: 160.8 LBS

## 2018-11-13 DIAGNOSIS — I49.5 SINUS NODE DYSFUNCTION (H): ICD-10-CM

## 2018-11-13 DIAGNOSIS — I10 ESSENTIAL HYPERTENSION: ICD-10-CM

## 2018-11-13 DIAGNOSIS — I44.2 INTERMITTENT COMPLETE ATRIOVENTRICULAR BLOCK (H): ICD-10-CM

## 2018-11-13 DIAGNOSIS — I25.10 CORONARY ARTERY DISEASE INVOLVING NATIVE CORONARY ARTERY OF NATIVE HEART WITHOUT ANGINA PECTORIS: Primary | ICD-10-CM

## 2018-11-13 DIAGNOSIS — I87.2 VENOUS (PERIPHERAL) INSUFFICIENCY: ICD-10-CM

## 2018-11-13 DIAGNOSIS — Z95.0 CARDIAC PACEMAKER IN SITU: Primary | ICD-10-CM

## 2018-11-13 PROCEDURE — 93005 ELECTROCARDIOGRAM TRACING: CPT | Mod: ZF

## 2018-11-13 PROCEDURE — 99204 OFFICE O/P NEW MOD 45 MIN: CPT | Mod: GC | Performed by: INTERNAL MEDICINE

## 2018-11-13 PROCEDURE — 99214 OFFICE O/P EST MOD 30 MIN: CPT | Mod: ZP | Performed by: INTERNAL MEDICINE

## 2018-11-13 PROCEDURE — 93010 ELECTROCARDIOGRAM REPORT: CPT | Mod: ZP | Performed by: INTERNAL MEDICINE

## 2018-11-13 PROCEDURE — G0463 HOSPITAL OUTPT CLINIC VISIT: HCPCS

## 2018-11-13 ASSESSMENT — PAIN SCALES - GENERAL
PAINLEVEL: NO PAIN (0)
PAINLEVEL: NO PAIN (0)

## 2018-11-13 NOTE — PATIENT INSTRUCTIONS
It was a pleasure to see you in clinic today. Please do not hesitate to call with any questions or concerns. You are scheduled for a remote Pacemaker transmission on 2/13/19.  We will call in 1-2 business days to discuss the results with you. We look forward to seeing you in clinic at your next device check in 1 year    Ruby Mann, RN  Electrophysiology Nurse Clinician  [facility]  During business hours call:  [phone]  After business hours please call: [phone]- select option #4 and ask for job code [id number].

## 2018-11-13 NOTE — PATIENT INSTRUCTIONS
We encourage you to use My Chart as your primary form of communication if possible. If you need assistance in setting this up, please contact our office or ask at your follow up visit.     If you need a medication refill please contact your pharmacy. Please allow at least 3 business days for your refill to be completed.       Cardiology  Telephone Number    Lilly Ritchie [phone]   Or send a message to your provider via my chart.   For scheduling procedures:    [facility]    Clinic appointments       [phone]   For the Device Clinic (Pacemakers and ICD's)   RN's :   Belen Brito  During business hours: [phone]    After business hours:   [phone]- select option 4 and ask for job code [id number].          As always, Thank you for trusting us with your health care needs!

## 2018-11-13 NOTE — MR AVS SNAPSHOT
MRN:[id number]                      After Visit Summary   11/13/2018    Tessa Mansfield    MRN: [id number]           Visit Information        Provider Department      11/13/2018 10:00 AM 1, [facility] Cv Device [facility]        Your next 10 appointments already scheduled     Nov 13, 2018 12:00 PM CST   (Arrive by 11:45 AM)   Return Visit with Tyrell Santoyo MD   [facility] ([facility])    [address]  [address]  [place] [id number] [phone]            Dec 19, 2018 12:50 PM CST   (Arrive by 12:35 PM)   Return Visit with Pedro Gomez MD   [facility] ([facility])    [address]  4th Floor  [place] [id number] [phone]            Apr 17, 2019  9:30 AM CDT   Lab with  LAB   [facility] Lab ([facility])    [address]  1st Floor  [facility] [id number] [phone]            Apr 17, 2019 10:20 AM CDT   (Arrive by 9:50 AM)   Return Visit with Marzena Martin MD   [facility] Nephrology ([facility])    [address]  [address]  [place] [id number] [phone]              Care Instructions    It was a pleasure to see you in clinic today. Please do not hesitate to call with any questions or concerns. You are scheduled for a remote Pacemaker transmission on 2/13/19.  We will call in 1-2 business days to discuss the results with you. We look forward to seeing you in clinic at your next device check in 1 year    Ruby Mann, RN  Electrophysiology Nurse Clinician  [facility]  During business hours call:  [phone]  After business hours please call: [phone]- select option #4 and ask for job code [id number].         Care EveryWhere ID     This is your Care EveryWhere ID. This could be used by other organizations to access your [facility]  records  [id number]        Equal Access to Services     FAYE GUILLORY : Lis Gross, rohith smith, kelly franco, houston fisher. So [facility] [phone].    ATENCIÓN: Si habla español, tiene a braga disposición servicios gratuitos de asistencia lingüística. Llame al [phone].    We comply with applicable federal civil rights laws and [place] laws. We do not discriminate on the basis of race, color, national origin, age, disability, sex, sexual orientation, or gender identity.

## 2018-11-13 NOTE — LETTER
11/13/2018      RE: Tessa Mansfield  [address]  [place] [address]       Dear Colleague,    Thank you for the opportunity to participate in the care of your patient, Tessa Mansfield, at the [facility]. Please see a copy of my visit note below.    Chief complaint: CAD, establish follow up    HPI:   Tessa Mansfield is a 83 year old female former patient of Dr. Mir with extensive CAD history as detailed below (PCI to all 3 vessels at various times from 3107-0754, last PCA to RPDA 7/23/13 and known 80% ISR of small LCX) on indefinite dual antiplatelet therapy and sick sinus syndrome s/p dual-chamber PPM who present to re-establish follow up.     She is being evaluated for surgery on her left eyelid. Her ophthalmologist requested she be off DAPT for this.    she denies any chest pain, dyspnea at rest or with exertion, PND, orthopnea, peripheral edema, palpitations, lightheadedness or syncope.     Summary of CAD history:  1.  10/27/2002: AMI s/p PCI+BMS (3.5x18mm Bx velocity) to mRCA  2. 2/5/2003: Back pain; PCI+stent to mLCX c/b distal embolization/slow flow  3. 4/11/2003: Unstable angina; PCI+stent (Hepacoat velocity) to mLAD  4. 11/27/2007: PCI+DESx2 to pLAD (IVUS w/ calcification of LMCA and ulcerated plaque pLAD, 80% dRCA; also had 80% dLCX, too small to stent)  5. 12/11/2007: Staged PCI. PCI+DESx1 (3.5x13mm Cypher) to dRCA (indefinite DAPT recommended at this time)  6. 6/27/2008: Angina. mLCX stent 70% ISR. LAD and RCA stents patent. Myocardium at risk from LCX felt to be small, medical management preferred to PCI.  7. 11/10/2009: Angina. Findings unchanged from 6/27/2008, FFR LAD 0.90. Medical management recommended.  8. 7/23/2013: Unstable angina; PCI+ALVAREZ to mPDA. Diagnostic findings: LMCA 40% distal. LAD: pLAD stents patent mLAD 30% ISR dLAD 50% diffuse, D2 diffuse disease. LCX 80% mid ISR. RCA diffuse <30% mid, RPLAs diffuse disease, RPDA  100% occlusion.         PAST MEDICAL HISTORY:  Past Medical History:   Diagnosis Date     CAD (coronary artery disease)      Cardiac Pacemaker- Medtronic, dual chamber- NOT dependant 11/28/2007     CKD (chronic kidney disease), stage IV (H)      Hyperlipidemia LDL goal <70      Hypertension      Stented coronary artery 10-    RCA     Stented coronary artery 2-5-2003    LCx     Stented coronary artery 4-    LAD     Stented coronary artery 11-    LAD     Stented coronary artery 12-    RCA     Transient complete heart block (H) 11/28/2007       CURRENT MEDICATIONS:  Current Outpatient Prescriptions   Medication Sig Dispense Refill     allopurinol (ZYLOPRIM) 100 MG tablet Take 1 tablet (100 mg) by mouth daily 90 tablet 3     amLODIPine (NORVASC) 2.5 MG tablet Take 1 tablet (2.5 mg) by mouth daily 30 tablet 11     aspirin 81 MG tablet Take 1 tablet by mouth daily.       clopidogrel (PLAVIX) 75 MG tablet Take 1 tablet (75 mg) by mouth daily 90 tablet 1     cyanocolbalamin (VITAMIN B-12) 1000 MCG tablet Take 500 mcg by mouth daily As directed       furosemide (LASIX) 40 MG tablet TAKE ONE TABLET BY MOUTH ONCE DAILY 90 tablet 3     isosorbide mononitrate (ISMO/MONOKET) 20 MG tablet Take 2 tablets (40 mg) by mouth 3 times daily Please make a follow up appointment for further refills. 540 tablet 1     metoprolol (LOPRESSOR) 100 MG tablet Take 1 tablet (100 mg) by mouth 2 times daily 180 tablet 3     multivitamin  with lutein (OCUVITE WITH LTEIN) CAPS Take 1 capsule by mouth daily       NITROSTAT 0.4 MG sublingual tablet DISSOLVE ONE TABLET UNDER THE TONGUE EVERY 5 MINUTES AS NEEDED FOR CHEST PAIN.  DO NOT EXCEED A TOTAL OF 3 DOSES IN 15 MINUTES. CALL 911. 25 tablet 3     omeprazole (PRILOSEC) 40 MG capsule TAKE ONE CAPSULE BY MOUTH ONCE DAILY 90 capsule 3     order for DME Equipment being ordered: Left hand and wrist brace 1 each 0     potassium chloride (K-TAB,KLOR-CON) 10 MEQ tablet Take 2 tabs  "in AM, 1 tab in PM (Patient taking differently: daily Take 2 tabs) 270 tablet 3     pravastatin (PRAVACHOL) 80 MG tablet Take 1 tablet (80 mg) by mouth At Bedtime 90 tablet 0     timolol (TIMOPTIC) 0.5 % ophthalmic solution Place 1 drop into both eyes daily          PAST SURGICAL HISTORY:  Past Surgical History:   Procedure Laterality Date     CHOLECYSTECTOMY       HC PPM INSERTION NEW/REPLACEMENT W/ ATRIAL&VENTRICULAR LEAD  11-     HYSTERECTOMY         ALLERGIES:     Allergies   Allergen Reactions     Bactrim [Sulfamethoxazole W/Trimethoprim] Other (See Comments)     Patient unable to recall     Biaxin [Clarithromycin]      Chlorthalidone Nausea and Vomiting     Clonidine      Codeine Sulfate Itching     Darvon [Propoxyphene Hcl]      Dilaudid [Hydromorphone] Visual Disturbance     Hallucinations       Gabapentin Other (See Comments) and Fatigue     \"felt drunk\"     Indocin      Levaquin [Levofloxacin Hemihydrate]      Lyrica Fatigue     Morphine Sulfate      Percocet [Oxycodone-Acetaminophen]      Spironolactone Other (See Comments)     Dehydrated       Terazosin      Ciprofloxacin Rash     Simvastatin Palpitations     Muscle weakness, leg cramping         FAMILY HISTORY:  Family History   Problem Relation Age of Onset     Cancer Sister 82     bladder cancer     No family history of premature CAD or sudden death.    SOCIAL HISTORY:  Social History   Substance Use Topics     Smoking status: Former Smoker     Packs/day: 0.30     Years: 15.00     Types: Cigarettes     Smokeless tobacco: Never Used      Comment: Quit 22+ years ago     Alcohol use Not on file       ROS:   A comprehensive 14 point review of systems is negative other than as mentioned in HPI.    Exam:  /78 (BP Location: Left arm, Patient Position: Sitting, Cuff Size: Adult Regular)  Pulse 68  Ht 1.702 m (5' 7\")  Wt 72.9 kg (160 lb 12.8 oz)  SpO2 100%  BMI 25.18 kg/m2  GENERAL APPEARANCE: healthy, alert and no distress  EYES: no icterus, " no xanthelasmas  ENT: normal palate, mucosa moist, no central cyanosis  NECK: no adenopathy, no asymmetry, masses, or scars, thyroid normal to palpation and no bruits, JVP not elevated  RESPIRATORY: lungs clear to auscultation - no rales, rhonchi or wheezes, no use of accessory muscles, no retractions, respirations are unlabored, normal respiratory rate  CARDIOVASCULAR: regular rhythm, normal S1 with physiologic split S2, no S3 or S4 and no murmur, click or rub, precordium quiet with normal PMI.  GI: soft, non tender, without hepatosplenomegaly, no masses palpable, bowel sounds normal, aorta not enlarged by palpation, no abdominal bruits  EXTREMITIES: peripheral pulses normal, no edema, no bruits  NEURO: alert and oriented to person/place/time, normal speech, gait and affect  VASC: Radial, dorsalis pedis and posterior tibialis pulses 2+ bilaterally.  SKIN: no ecchymoses, no rashes.  PSYCH: cooperative, affect appropriate.     Labs:  Reviewed.       Testing/Procedures:  I personally visualized her prior coronary angiograms from 0445-7024, results as per HPI.    Assessment and Plan:   1. CAD s/p multiple PCIs as above (last to RPDA 7/23/13) and known 80% ISR of small mLCX, without angina:   -continue DAPT indefinitely  -ASA and clopidogrel should not be interrupted for eye surgery given her extensive CAD history and history of multiple MIs shortly after stopping 2nd antiplatelet agent    2. Preoperative risk stratificaiton for eye surgery:  Mrs. Mansfield is of acceptable CV risk to undergo this low-risk surgery without further cardiac workup, provided that it can be done on dual antiplatelet therapy. She is at high risk of ACS with interruptions in DAPT and ASA and clopidogrel should not be held in the absence of acute, life-threatening bleeding.    3. Sick sinus syndrome s/p dual chamber PPM:  Followed by Dr. Edwards. Device check today shows normal function.     The patient states understanding and is agreeable with  plan.     Tyrell Santoyo MD  Cardiology    CC  ESTABLISHED PATIENT

## 2018-11-13 NOTE — MR AVS SNAPSHOT
After Visit Summary   11/13/2018    Tessa Mansfield    MRN: [id number]           Patient Information     Date Of Birth          1935        Visit Information        Provider Department      11/13/2018 10:30 AM Max Edwards MD [facility]        Today's Diagnoses     Cardiac pacemaker in situ    -  1    Intermittent complete atrioventricular block (H)          Care Instructions        We encourage you to use My Chart as your primary form of communication if possible. If you need assistance in setting this up, please contact our office or ask at your follow up visit.     If you need a medication refill please contact your pharmacy. Please allow at least 3 business days for your refill to be completed.       Cardiology  Telephone Number    Lilly Ritchie [phone]   Or send a message to your provider via my chart.   For scheduling procedures:    [facility]    Clinic appointments       [phone]   For the Device Clinic (Pacemakers and ICD's)   RN's :   Belen Brito  During business hours: [phone]    After business hours:   [phone]- select option 4 and ask for job code [phone].          As always, Thank you for trusting us with your health care needs!          Follow-ups after your visit        Your next 10 appointments already scheduled     Dec 19, 2018 12:50 PM CST   (Arrive by 12:35 PM)   Return Visit with Pedro Gomez MD   [facility] ([facility])    [address] [place] [address]  4th [facility] [address] [phone]            Apr 17, 2019  9:30 AM CDT   Lab with  LAB   [facility] Lab ([facility])    [address] [place] [address]  1st [facility] [id number] [phone]            Apr 17, 2019 10:20 AM CDT   (Arrive by 9:50 AM)   Return Visit with Marzena Martin MD   [facility] Nephrology ([facility])    [address] [place] [address]  "[address]  [address]  [facility] [id number] [phone]              Future tests that were ordered for you today     Open Future Orders        Priority Expected Expires Ordered    Follow-Up with Cardiologist Routine 11/13/2019 11/14/2019 11/13/2018    Follow-Up with Device Clinic - 1 year Routine 11/13/2019 11/14/2019 11/13/2018            Who to contact     If you have questions or need follow up information about today's clinic visit or your schedule please contact [facility] directly at [phone].  Normal or non-critical lab and imaging results will be communicated to you by MyChart, letter or phone within 4 business days after the clinic has received the results. If you do not hear from us within 7 days, please contact the clinic through MyChart or phone. If you have a critical or abnormal lab result, we will notify you by phone as soon as possible.  Submit refill requests through [employer] or call your pharmacy and they will forward the refill request to us. Please allow 3 business days for your refill to be completed.          Additional Information About Your Visit        Care EveryWhere ID     This is your Care EveryWhere ID. This could be used by other organizations to access your [facility] medical records  [id number]        Your Vitals Were     Pulse Height Pulse Oximetry BMI (Body Mass Index)          68 1.702 m (5' 7\") 100% 25.18 kg/m2         Blood Pressure from Last 3 Encounters:   11/13/18 138/78   11/13/18 154/77   08/15/18 167/84    Weight from Last 3 Encounters:   11/13/18 72.9 kg (160 lb 12.8 oz)   11/13/18 72.9 kg (160 lb 12.8 oz)   08/15/18 68.2 kg (150 lb 6.4 oz)              Today, you had the following     No orders found for display         Today's Medication Changes          These changes are accurate as of 11/13/18  6:48 PM.  If you have any questions, ask your nurse or doctor.               These medicines have changed or have updated prescriptions.        Dose/Directions    " potassium chloride 10 MEQ tablet   Commonly known as:  K-TAB,KLOR-CON   This may have changed:    - when to take this  - additional instructions   Used for:  Hypokalemia        Take 2 tabs in AM, 1 tab in PM   Quantity:  270 tablet   Refills:  3                Primary Care Provider Office Phone # Fax #    Pedro Gomez [phone]       [address] [address]        Equal Access to Services     FAYE GUILLORY : Hadii aad ku hadasho Soomaali, waaxda luqadaha, qaybta kaalmada adeegyada, waxay idiin hayaan adeeg kharash la'ihsan ah. So [facility] [phone].    ATENCIÓN: Si chika zuleta, tiene a braga disposición servicios gratuitos de asistencia lingüística. [facility] [phone].    We comply with applicable federal civil rights laws and [place] laws. We do not discriminate on the basis of race, color, national origin, age, disability, sex, sexual orientation, or gender identity.            Thank you!     Thank you for choosing [facility]  for your care. Our goal is always to provide you with excellent care. Hearing back from our patients is one way we can continue to improve our services. Please take a few minutes to complete the written survey that you may receive in the mail after your visit with us. Thank you!             Your Updated Medication List - Protect others around you: Learn how to safely use, store and throw away your medicines at www.disposemymeds.org.          This list is accurate as of 11/13/18  6:48 PM.  Always use your most recent med list.                   Brand Name Dispense Instructions for use Diagnosis    allopurinol 100 MG tablet    ZYLOPRIM    90 tablet    Take 1 tablet (100 mg) by mouth daily    Gout       amLODIPine 2.5 MG tablet    NORVASC    30 tablet    Take 1 tablet (2.5 mg) by mouth daily    Essential hypertension       aspirin 81 MG tablet      Take 1 tablet by mouth daily.        clopidogrel 75 MG tablet    PLAVIX    90 tablet    Take 1  tablet (75 mg) by mouth daily    NSTEMI (non-ST elevated myocardial infarction) (H), S/P angioplasty with stent       cyanocobalamin 1000 MCG tablet    vitamin  B-12     Take 500 mcg by mouth daily As directed        furosemide 40 MG tablet    LASIX    90 tablet    TAKE ONE TABLET BY MOUTH ONCE DAILY    Essential hypertension       isosorbide mononitrate 20 MG tablet    ISMO/MONOKET    540 tablet    Take 2 tablets (40 mg) by mouth 3 times daily Please make a follow up appointment for further refills.    Coronary artery disease involving native coronary artery of native heart without angina pectoris, Chest pain       metoprolol tartrate 100 MG tablet    LOPRESSOR    180 tablet    Take 1 tablet (100 mg) by mouth 2 times daily    Essential hypertension       multivitamin  with lutein Caps per capsule      Take 1 capsule by mouth daily        NITROSTAT 0.4 MG sublingual tablet   Generic drug:  nitroGLYcerin     25 tablet    DISSOLVE ONE TABLET UNDER THE TONGUE EVERY 5 MINUTES AS NEEDED FOR CHEST PAIN.  DO NOT EXCEED A TOTAL OF 3 DOSES IN 15 MINUTES. CALL 911.    CAD (coronary artery disease)       omeprazole 40 MG capsule    priLOSEC    90 capsule    TAKE ONE CAPSULE BY MOUTH ONCE DAILY    Esophageal reflux       order for DME     1 each    Equipment being ordered: Left hand and wrist brace    Pain of left hand, Fall from standing, initial encounter       potassium chloride 10 MEQ tablet    K-TAB,KLOR-CON    270 tablet    Take 2 tabs in AM, 1 tab in PM    Hypokalemia       pravastatin 80 MG tablet    PRAVACHOL    90 tablet    Take 1 tablet (80 mg) by mouth At Bedtime    Hyperlipidemia LDL goal <130       timolol 0.5 % ophthalmic solution    TIMOPTIC     Place 1 drop into both eyes daily

## 2018-11-13 NOTE — MR AVS SNAPSHOT
After Visit Summary   11/13/2018    Tessa Mansfield    MRN: [id number]           Patient Information     Date Of Birth          1935        Visit Information        Provider Department      11/13/2018 12:00 PM Tyrell Santoyo MD [facility] CARDIOVASCULAR      Today's Diagnoses     CAD s/p PCI+BMS to MRCA 10/2002, PCI to mLCX 2/2003, PCI+DESx2 to pLAD 11/2007, PCI+DESx1 to dRCA 12/2007, PCI+ALVAREZ to RPDA 7/2013; on indefinite DAPT w/o angina    -  1    Sinus node dysfunction (H)        Essential hypertension        Venous (peripheral) insufficiency          Care Instructions    You were seen at the [facility] clinic today.  You saw Dr. Tyrell Santoyo  Here are your Instructions:    1. Do not stop Plavix-- have your Ophthalmologist contact Dr. Santoyo if he has questions.   2. Continue current medications.   3. Get compression stockings to help with leg swelling.  4. Record home BPs, bring recordings to Dr. Abbott  5. Follow up with Dr. Santoyo in 1 year.         If you have questions after this visit:  Send a [employer] message or contact Subhash Mason LPN  Office:  [phone] option #1, then #3 & ask for Subhash (nurse line)  Fax:  [phone]  After Hours:  [phone] option #4 ask to speak to he on-call Cardiologist  Appointments:  [phone] option #1, then option #1                      Follow-ups after your visit        Additional Services     Follow-Up with Cardiologist                 Follow-up notes from your care team     Return in about 1 year (around 11/13/2019).      Your next 10 appointments already scheduled     Dec 19, 2018 12:50 PM CST   (Arrive by 12:35 PM)   Return Visit with Pedro Gomez MD   [facility] ([facility])    [address] [place] [address] [phone]            Apr 17, 2019  9:30 AM CDT   Lab with [facility]  "Lab ([facility])    [address] [facility] [address]  1st Floor  [facility] [id number] [phone]            Apr 17, 2019 10:20 AM CDT   (Arrive by 9:50 AM)   Return Visit with Marzena Martin MD   [facility] Nephrology ([facility])    [address] [facility] [address]  [address]  [facility] [id number] [phone]              Future tests that were ordered for you today     Open Future Orders        Priority Expected Expires Ordered    Follow-Up with Cardiologist Routine 11/13/2019 11/14/2019 11/13/2018    Follow-Up with Device Clinic - 1 year Routine 11/13/2019 11/14/2019 11/13/2018            Who to contact     If you have questions or need follow up information about today's clinic visit or your schedule please contact [facility] directly at [phone].  Normal or non-critical lab and imaging results will be communicated to you by MyChart, letter or phone within 4 business days after the clinic has received the results. If you do not hear from us within 7 days, please contact the clinic through MyChart or phone. If you have a critical or abnormal lab result, we will notify you by phone as soon as possible.  Submit refill requests through [employer] or call your pharmacy and they will forward the refill request to us. Please allow 3 business days for your refill to be completed.          Additional Information About Your Visit        Care EveryWhere ID     This is your Care EveryWhere ID. This could be used by other organizations to access your [employer] medical records  [id number]        Your Vitals Were     Pulse Height Pulse Oximetry BMI (Body Mass Index)          68 1.702 m (5' 7\") 100% 25.18 kg/m2         Blood Pressure from Last 3 Encounters:   11/13/18 138/78   11/13/18 154/77   08/15/18 167/84    Weight from Last 3 Encounters:   11/13/18 72.9 kg (160 lb 12.8 oz)   11/13/18 72.9 kg (160 lb 12.8 oz)   08/15/18 68.2 kg (150 lb 6.4 oz)              We " Performed the Following     Follow-Up with Cardiologist          Today's Medication Changes          These changes are accurate as of 11/13/18  6:49 PM.  If you have any questions, ask your nurse or doctor.               These medicines have changed or have updated prescriptions.        Dose/Directions    potassium chloride 10 MEQ tablet   Commonly known as:  K-TAB,KLOR-CON   This may have changed:    - when to take this  - additional instructions   Used for:  Hypokalemia        Take 2 tabs in AM, 1 tab in PM   Quantity:  270 tablet   Refills:  3                Primary Care Provider Office Phone # Fax #    Pedro Gomez [phone]       [address] [address]        Equal Access to Services     FAYE GUILLORY : Lis escalera Sofelecia, wanir smith, qaashleyta kaalmada salvador, h[place] fisher. So [facility] [phone].    ATENCIÓN: Si habla español, tiene a braga disposición servicios gratuitos de asistencia lingüística. Llame al [phone].    We comply with applicable federal civil rights laws and [place] laws. We do not discriminate on the basis of race, color, national origin, age, disability, sex, sexual orientation, or gender identity.            Thank you!     Thank you for choosing [facility]  for your care. Our goal is always to provide you with excellent care. Hearing back from our patients is one way we can continue to improve our services. Please take a few minutes to complete the written survey that you may receive in the mail after your visit with us. Thank you!             Your Updated Medication List - Protect others around you: Learn how to safely use, store and throw away your medicines at www.disposemymeds.org.          This list is accurate as of 11/13/18  6:49 PM.  Always use your most recent med list.                   Brand Name Dispense Instructions for use Diagnosis    allopurinol 100 MG tablet    ZYLOPRIM     90 tablet    Take 1 tablet (100 mg) by mouth daily    Gout       amLODIPine 2.5 MG tablet    NORVASC    30 tablet    Take 1 tablet (2.5 mg) by mouth daily    Essential hypertension       aspirin 81 MG tablet      Take 1 tablet by mouth daily.        clopidogrel 75 MG tablet    PLAVIX    90 tablet    Take 1 tablet (75 mg) by mouth daily    NSTEMI (non-ST elevated myocardial infarction) (H), S/P angioplasty with stent       cyanocobalamin 1000 MCG tablet    vitamin  B-12     Take 500 mcg by mouth daily As directed        furosemide 40 MG tablet    LASIX    90 tablet    TAKE ONE TABLET BY MOUTH ONCE DAILY    Essential hypertension       isosorbide mononitrate 20 MG tablet    ISMO/MONOKET    540 tablet    Take 2 tablets (40 mg) by mouth 3 times daily Please make a follow up appointment for further refills.    Coronary artery disease involving native coronary artery of native heart without angina pectoris, Chest pain       metoprolol tartrate 100 MG tablet    LOPRESSOR    180 tablet    Take 1 tablet (100 mg) by mouth 2 times daily    Essential hypertension       multivitamin  with lutein Caps per capsule      Take 1 capsule by mouth daily        NITROSTAT 0.4 MG sublingual tablet   Generic drug:  nitroGLYcerin     25 tablet    DISSOLVE ONE TABLET UNDER THE TONGUE EVERY 5 MINUTES AS NEEDED FOR CHEST PAIN.  DO NOT EXCEED A TOTAL OF 3 DOSES IN 15 MINUTES. CALL 911.    CAD (coronary artery disease)       omeprazole 40 MG capsule    priLOSEC    90 capsule    TAKE ONE CAPSULE BY MOUTH ONCE DAILY    Esophageal reflux       order for DME     1 each    Equipment being ordered: Left hand and wrist brace    Pain of left hand, Fall from standing, initial encounter       potassium chloride 10 MEQ tablet    K-TAB,KLOR-CON    270 tablet    Take 2 tabs in AM, 1 tab in PM    Hypokalemia       pravastatin 80 MG tablet    PRAVACHOL    90 tablet    Take 1 tablet (80 mg) by mouth At Bedtime    Hyperlipidemia LDL goal <130       timolol 0.5 %  ophthalmic solution    TIMOPTIC     Place 1 drop into both eyes daily

## 2018-11-13 NOTE — PROGRESS NOTES
Chief complaint: CAD, establish follow up    HPI:   Tessa Mansfield is a 83 year old female former patient of Dr. Mir with extensive CAD history as detailed below (PCI to all 3 vessels at various times from 2870-9501, last PCA to RPDA 7/23/13 and known 80% ISR of small LCX) on indefinite dual antiplatelet therapy and sick sinus syndrome s/p dual-chamber PPM who present to re-establish follow up.     She is being evaluated for surgery on her left eyelid. Her ophthalmologist requested she be off DAPT for this.    she denies any chest pain, dyspnea at rest or with exertion, PND, orthopnea, peripheral edema, palpitations, lightheadedness or syncope.     Summary of CAD history:  1.  10/27/2002: AMI s/p PCI+BMS (3.5x18mm Bx velocity) to mRCA  2. 2/5/2003: Back pain; PCI+stent to mLCX c/b distal embolization/slow flow  3. 4/11/2003: Unstable angina; PCI+stent (Hepacoat velocity) to mLAD  4. 11/27/2007: PCI+DESx2 to pLAD (IVUS w/ calcification of LMCA and ulcerated plaque pLAD, 80% dRCA; also had 80% dLCX, too small to stent)  5. 12/11/2007: Staged PCI. PCI+DESx1 (3.5x13mm Cypher) to dRCA (indefinite DAPT recommended at this time)  6. 6/27/2008: Angina. mLCX stent 70% ISR. LAD and RCA stents patent. Myocardium at risk from LCX felt to be small, medical management preferred to PCI.  7. 11/10/2009: Angina. Findings unchanged from 6/27/2008, FFR LAD 0.90. Medical management recommended.  8. 7/23/2013: Unstable angina; PCI+ALVAREZ to mPDA. Diagnostic findings: LMCA 40% distal. LAD: pLAD stents patent mLAD 30% ISR dLAD 50% diffuse, D2 diffuse disease. LCX 80% mid ISR. RCA diffuse <30% mid, RPLAs diffuse disease, RPDA 100% occlusion.         PAST MEDICAL HISTORY:  Past Medical History:   Diagnosis Date     CAD (coronary artery disease)      Cardiac Pacemaker- Medtronic, dual chamber- NOT dependant 11/28/2007     CKD (chronic kidney disease), stage IV (H)      Hyperlipidemia LDL goal <70      Hypertension      Stented coronary artery  10-    RCA     Stented coronary artery 2-5-2003    LCx     Stented coronary artery 4-    LAD     Stented coronary artery 11-    LAD     Stented coronary artery 12-    RCA     Transient complete heart block (H) 11/28/2007       CURRENT MEDICATIONS:  Current Outpatient Prescriptions   Medication Sig Dispense Refill     allopurinol (ZYLOPRIM) 100 MG tablet Take 1 tablet (100 mg) by mouth daily 90 tablet 3     amLODIPine (NORVASC) 2.5 MG tablet Take 1 tablet (2.5 mg) by mouth daily 30 tablet 11     aspirin 81 MG tablet Take 1 tablet by mouth daily.       clopidogrel (PLAVIX) 75 MG tablet Take 1 tablet (75 mg) by mouth daily 90 tablet 1     cyanocolbalamin (VITAMIN B-12) 1000 MCG tablet Take 500 mcg by mouth daily As directed       furosemide (LASIX) 40 MG tablet TAKE ONE TABLET BY MOUTH ONCE DAILY 90 tablet 3     isosorbide mononitrate (ISMO/MONOKET) 20 MG tablet Take 2 tablets (40 mg) by mouth 3 times daily Please make a follow up appointment for further refills. 540 tablet 1     metoprolol (LOPRESSOR) 100 MG tablet Take 1 tablet (100 mg) by mouth 2 times daily 180 tablet 3     multivitamin  with lutein (OCUVITE WITH LTEIN) CAPS Take 1 capsule by mouth daily       NITROSTAT 0.4 MG sublingual tablet DISSOLVE ONE TABLET UNDER THE TONGUE EVERY 5 MINUTES AS NEEDED FOR CHEST PAIN.  DO NOT EXCEED A TOTAL OF 3 DOSES IN 15 MINUTES. CALL 911. 25 tablet 3     omeprazole (PRILOSEC) 40 MG capsule TAKE ONE CAPSULE BY MOUTH ONCE DAILY 90 capsule 3     order for DME Equipment being ordered: Left hand and wrist brace 1 each 0     potassium chloride (K-TAB,KLOR-CON) 10 MEQ tablet Take 2 tabs in AM, 1 tab in PM (Patient taking differently: daily Take 2 tabs) 270 tablet 3     pravastatin (PRAVACHOL) 80 MG tablet Take 1 tablet (80 mg) by mouth At Bedtime 90 tablet 0     timolol (TIMOPTIC) 0.5 % ophthalmic solution Place 1 drop into both eyes daily          PAST SURGICAL HISTORY:  Past Surgical History:  "  Procedure Laterality Date     CHOLECYSTECTOMY       HC PPM INSERTION NEW/REPLACEMENT W/ ATRIAL&VENTRICULAR LEAD  11-     HYSTERECTOMY         ALLERGIES:     Allergies   Allergen Reactions     Bactrim [Sulfamethoxazole W/Trimethoprim] Other (See Comments)     Patient unable to recall     Biaxin [Clarithromycin]      Chlorthalidone Nausea and Vomiting     Clonidine      Codeine Sulfate Itching     Darvon [Propoxyphene Hcl]      Dilaudid [Hydromorphone] Visual Disturbance     Hallucinations       Gabapentin Other (See Comments) and Fatigue     \"felt drunk\"     Indocin      Levaquin [Levofloxacin Hemihydrate]      Lyrica Fatigue     Morphine Sulfate      Percocet [Oxycodone-Acetaminophen]      Spironolactone Other (See Comments)     Dehydrated       Terazosin      Ciprofloxacin Rash     Simvastatin Palpitations     Muscle weakness, leg cramping         FAMILY HISTORY:  Family History   Problem Relation Age of Onset     Cancer Sister 82     bladder cancer     No family history of premature CAD or sudden death.    SOCIAL HISTORY:  Social History   Substance Use Topics     Smoking status: Former Smoker     Packs/day: 0.30     Years: 15.00     Types: Cigarettes     Smokeless tobacco: Never Used      Comment: Quit 22+ years ago     Alcohol use Not on file       ROS:   A comprehensive 14 point review of systems is negative other than as mentioned in HPI.    Exam:  /78 (BP Location: Left arm, Patient Position: Sitting, Cuff Size: Adult Regular)  Pulse 68  Ht 1.702 m (5' 7\")  Wt 72.9 kg (160 lb 12.8 oz)  SpO2 100%  BMI 25.18 kg/m2  GENERAL APPEARANCE: healthy, alert and no distress  EYES: no icterus, no xanthelasmas  ENT: normal palate, mucosa moist, no central cyanosis  NECK: no adenopathy, no asymmetry, masses, or scars, thyroid normal to palpation and no bruits, JVP not elevated  RESPIRATORY: lungs clear to auscultation - no rales, rhonchi or wheezes, no use of accessory muscles, no retractions, " respirations are unlabored, normal respiratory rate  CARDIOVASCULAR: regular rhythm, normal S1 with physiologic split S2, no S3 or S4 and no murmur, click or rub, precordium quiet with normal PMI.  GI: soft, non tender, without hepatosplenomegaly, no masses palpable, bowel sounds normal, aorta not enlarged by palpation, no abdominal bruits  EXTREMITIES: peripheral pulses normal, no edema, no bruits  NEURO: alert and oriented to person/place/time, normal speech, gait and affect  VASC: Radial, dorsalis pedis and posterior tibialis pulses 2+ bilaterally.  SKIN: no ecchymoses, no rashes.  PSYCH: cooperative, affect appropriate.     Labs:  Reviewed.       Testing/Procedures:  I personally visualized her prior coronary angiograms from 4142-2808, results as per HPI.    Assessment and Plan:   1. CAD s/p multiple PCIs as above (last to RPDA 7/23/13) and known 80% ISR of small mLCX, without angina:   -continue DAPT indefinitely  -ASA and clopidogrel should not be interrupted for eye surgery given her extensive CAD history and history of multiple MIs shortly after stopping 2nd antiplatelet agent    2. Preoperative risk stratificaiton for eye surgery:  Mrs. Mansfield is of acceptable CV risk to undergo this low-risk surgery without further cardiac workup, provided that it can be done on dual antiplatelet therapy. She is at high risk of ACS with interruptions in DAPT and ASA and clopidogrel should not be held in the absence of acute, life-threatening bleeding.    3. Sick sinus syndrome s/p dual chamber PPM:  Followed by Dr. Edwards. Device check today shows normal function.     The patient states understanding and is agreeable with plan.     Tyrell Santoyo MD  Cardiology    CC  ESTABLISHED PATIENT

## 2018-11-13 NOTE — PROGRESS NOTES
Cardiac Electrophysiology Clinic      Chief Complaint:  Pacemaker battery approaching replacement indicator    HPI:  Ms Mansfield is an 82 yo female in whom we implanted a dual chamber in 2007 for intermittent complete heart block and sinus node dysfunction, although since then she has been predominantly atrially paced with nearly no need for ventricular pacing.  Her cardiovascular history is also notable for coronary artery disease and stenting of the LAD, LCx, and RCA at various points, hypertension, and hyperlipidemia.  She returns for follow up after her device interrogation showed her to be approaching LOLA within the next one to two years.  She was seeing Dr. Mir for her cardiovascular care until he retired and now she is following with Dr. Tyrell Santoyo.    She feels overall well.  She denies chest pain, dyspnea, lightheadedness, syncope, and reduced exercise capacity.  She notices mild lower leg edema bilaterally in the evenings that is resolved by the morning.  She has not had any pain or discomfort at the device pocket.  She denies frequent hiccups or muscle spasms suggestive of extra-cardiac stimulation.    Device interrogation today, personally reviewed, showed an intrinsic rhythm of sinus at 51 bpm, normal pacemaker & lead function, 89% A-pacing, 0.3% V-pacing, and an estimated 8-45 months until LOLA.          Current Outpatient Prescriptions on File Prior to Visit:  allopurinol (ZYLOPRIM) 100 MG tablet Take 1 tablet (100 mg) by mouth daily   amLODIPine (NORVASC) 2.5 MG tablet Take 1 tablet (2.5 mg) by mouth daily   aspirin 81 MG tablet Take 1 tablet by mouth daily.   clopidogrel (PLAVIX) 75 MG tablet Take 1 tablet (75 mg) by mouth daily   cyanocolbalamin (VITAMIN B-12) 1000 MCG tablet Take 500 mcg by mouth daily As directed   furosemide (LASIX) 40 MG tablet TAKE ONE TABLET BY MOUTH ONCE DAILY   isosorbide mononitrate (ISMO/MONOKET) 20 MG tablet Take 2 tablets (40 mg) by mouth 3 times daily Please  "make a follow up appointment for further refills.   metoprolol (LOPRESSOR) 100 MG tablet Take 1 tablet (100 mg) by mouth 2 times daily   multivitamin  with lutein (OCUVITE WITH LTEIN) CAPS Take 1 capsule by mouth daily   NITROSTAT 0.4 MG sublingual tablet DISSOLVE ONE TABLET UNDER THE TONGUE EVERY 5 MINUTES AS NEEDED FOR CHEST PAIN.  DO NOT EXCEED A TOTAL OF 3 DOSES IN 15 MINUTES. CALL 911.   omeprazole (PRILOSEC) 40 MG capsule TAKE ONE CAPSULE BY MOUTH ONCE DAILY   potassium chloride (K-TAB,KLOR-CON) 10 MEQ tablet Take 2 tabs in AM, 1 tab in PM (Patient taking differently: daily Take 2 tabs)   pravastatin (PRAVACHOL) 80 MG tablet Take 1 tablet (80 mg) by mouth At Bedtime   timolol (TIMOPTIC) 0.5 % ophthalmic solution Place 1 drop into both eyes daily    order for DME Equipment being ordered: Left hand and wrist brace (Patient not taking: Reported on 11/13/2018)     No current facility-administered medications on file prior to visit.   Allergies   Allergen Reactions     Bactrim [Sulfamethoxazole W/Trimethoprim] Other (See Comments)     Patient unable to recall     Biaxin [Clarithromycin]      Chlorthalidone Nausea and Vomiting     Clonidine      Codeine Sulfate Itching     Darvon [Propoxyphene Hcl]      Dilaudid [Hydromorphone] Visual Disturbance     Hallucinations       Gabapentin Other (See Comments) and Fatigue     \"felt drunk\"     Indocin      Levaquin [Levofloxacin Hemihydrate]      Lyrica Fatigue     Morphine Sulfate      Percocet [Oxycodone-Acetaminophen]      Spironolactone Other (See Comments)     Dehydrated       Terazosin      Ciprofloxacin Rash     Simvastatin Palpitations     Muscle weakness, leg cramping         Past Medical History:   Diagnosis Date     CAD (coronary artery disease)      Cardiac Pacemaker- Medtronic, dual chamber- NOT dependant 11/28/2007     CKD (chronic kidney disease), stage IV (H)      Hyperlipidemia LDL goal <70      Hypertension      Stented coronary artery 10-    RCA     " "Stented coronary artery 2-5-2003    LCx     Stented coronary artery 4-    LAD     Stented coronary artery 11-    LAD     Stented coronary artery 12-    RCA     Transient complete heart block (H) 11/28/2007     Past Surgical History:   Procedure Laterality Date     CHOLECYSTECTOMY       HC PPM INSERTION NEW/REPLACEMENT W/ ATRIAL&VENTRICULAR LEAD  11-     HYSTERECTOMY       Family History   Problem Relation Age of Onset     Cancer Sister 82     bladder cancer     Social History     Social History     Marital status:      Spouse name: N/A     Number of children: 4     Years of education: N/A     Occupational History      [employer]     Social History Main Topics     Smoking status: Former Smoker     Packs/day: 0.30     Years: 15.00     Types: Cigarettes     Smokeless tobacco: Never Used      Comment: Quit 22+ years ago     Alcohol use Not on file     Drug use: No     Sexual activity: Not Currently     Partners: Male     Birth control/ protection: Post-menopausal     Other Topics Concern     Blood Transfusions Yes     Exercise No     Social History Narrative     A complete review of systems is negative except as noted above in the HPI.    Physical Examination:  Vitals: /77 (BP Location: Left arm, Patient Position: Chair, Cuff Size: Adult Regular)  Pulse 68  Ht 1.702 m (5' 7\")  Wt 72.9 kg (160 lb 12.8 oz)  SpO2 100%  BMI 25.18 kg/m2  GENERAL APPEARANCE: comfortable appearing female with unlabored breathing at rest.  HEENT: no scleral icterus, no xanthelasmas  NECK:  JVP <7 cm.  CHEST: lungs clear to auscultation  CARDIOVASCULAR:  RRR with normal s1 and s2, no murmur or rub, warm extremities, no peripheral edema.  ABDOMEN: soft, not tender  NEURO: alert and fully oriented, fluent speech.  SKIN: no peripheral petechiae/ecchymoses, not jaundiced    Relevant labs, imaging, and procedures were reviewed.  Recent Labs   Lab Test  08/15/18   1113  06/25/18   0942   CR  " 1.77*  1.75*   HGB  12.4  11.5*   CHOL   --   144   LDL   --   70   HDL   --   47*         Assessment and recommendations:  Ms Martinez is an 84 yo female with a dual chamber pacemaker implanted in 2007 for intermittent complete heart block and sinus node dysfunction who has been predominantly A-paced since then (89% Ap, 0.3%  on today's interrogation) and returns to discuss eventual generator replacement.  She is NOT pacemaker dependent.  Her intrinsic rhythm today is sinus rhythm at 51 bpm.  Her medical history is also notable for coronary artery disease s/p stenting of the LAD, RCA, and LCx; hypertension; and hyperlipidemia.  Her pacemaker & leads are functioning normally.  She has no symptoms of extra-cardiac stimulation.  She has no signs or symptoms of heart failure or angina.  We discussed the risks of generator replacement including bleeding, infection, and inadvertent injury to the leads necessitating lead replacement.  She is seeing Dr. Santoyo later this morning regarding her other cardiovascular care.  She will follow up with us at the time of generator replacement.    I discussed the patient with Dr. Max Edwards.    Jerman Pickett MD  Cardiovascular disease fellow    CARDIOLOGY STAFF  Patient seen and examined by me.  History and physical examination discussed with Dr. Pickett whose note reflects our joint assessment and recommendation/plans.  We have been asked by Dr. Santoyo to see Tessa Mansfield. I have seen her, perhaps only once, in 2007 when I was asked to implant a dual chamber PPM for a 9.5 and and 11.5 second period of asystole due to transient complete heart block. She has CAD and had had a LAD stent placed prior the PPM implant. The most recent device check on [facility] as of today was 8/21/2018 and showed normal function, estimated 27 months to LOLA. She saw Dr. Mir in November 2017 and was deemed to be doing well since her previous PCI on 7/13/2013. Her AF burden at that time was noted to  be < 0.1%.  She had done very well from an arrhythmia standpoint with no syncope or presyncope.  Her pacemaker shows normal function.  Her pacemaker model tends to have an imprecise predication of battery life (estimating 8-45 months).  We discussed the procedure for generator replacement.  She has had several stents since we implanted her PPM but is currently without angina.  She is seeing Dr. Santoyo later today.  She will continue device follow-up and we will see her in the future when she needs a generator replacement.  It was a pleasure seeing Tessa Mansfield today.  Max Edwards

## 2018-11-13 NOTE — PROGRESS NOTES
Preliminary Device Interrogation Results.  Final physician signed paceart report to be scanned and attached.    Pt seen in Clinic for evaluation and iterative programming of a Medtronic Dual lead pacemaker, per MD orders. Today her intrinsic rhythm is NSR @ 51 bpm. Normal pacemaker function. No arrhythmias have been recorded. Lead trends appear stable. AP= 89.2% and = .3 %. Pt reports she is feeling well. Battery estimates 8 - 45 months (median of 26 months)  to LOLA. Plan for pt to send a remote transmission on 2/13/19 and RTC in 1 year.    Daul lead pacemaker

## 2018-11-13 NOTE — LETTER
11/13/2018      RE: Tessa Mansfield  [address]  [facility] [address]       Dear Colleague,    Thank you for the opportunity to participate in the care of your patient, Tessa Mansfield, at the [facility]. Please see a copy of my visit note below.    Cardiac Electrophysiology Clinic      Chief Complaint:  Pacemaker battery approaching replacement indicator    HPI:  Ms Mansfield is an 84 yo female in whom we implanted a dual chamber in 2007 for intermittent complete heart block and sinus node dysfunction, although since then she has been predominantly atrially paced with nearly no need for ventricular pacing.  Her cardiovascular history is also notable for coronary artery disease and stenting of the LAD, LCx, and RCA at various points, hypertension, and hyperlipidemia.  She returns for follow up after her device interrogation showed her to be approaching LOLA within the next one to two years.  She was seeing Dr. Mir for her cardiovascular care until he retired and now she is following with Dr. Tyrell Santoyo.    She feels overall well.  She denies chest pain, dyspnea, lightheadedness, syncope, and reduced exercise capacity.  She notices mild lower leg edema bilaterally in the evenings that is resolved by the morning.  She has not had any pain or discomfort at the device pocket.  She denies frequent hiccups or muscle spasms suggestive of extra-cardiac stimulation.    Device interrogation today, personally reviewed, showed an intrinsic rhythm of sinus at 51 bpm, normal pacemaker & lead function, 89% A-pacing, 0.3% V-pacing, and an estimated 8-45 months until LOLA.          Current Outpatient Prescriptions on File Prior to Visit:  allopurinol (ZYLOPRIM) 100 MG tablet Take 1 tablet (100 mg) by mouth daily   amLODIPine (NORVASC) 2.5 MG tablet Take 1 tablet (2.5 mg) by mouth daily   aspirin 81 MG tablet Take 1 tablet by mouth daily.   clopidogrel (PLAVIX)  "75 MG tablet Take 1 tablet (75 mg) by mouth daily   cyanocolbalamin (VITAMIN B-12) 1000 MCG tablet Take 500 mcg by mouth daily As directed   furosemide (LASIX) 40 MG tablet TAKE ONE TABLET BY MOUTH ONCE DAILY   isosorbide mononitrate (ISMO/MONOKET) 20 MG tablet Take 2 tablets (40 mg) by mouth 3 times daily Please make a follow up appointment for further refills.   metoprolol (LOPRESSOR) 100 MG tablet Take 1 tablet (100 mg) by mouth 2 times daily   multivitamin  with lutein (OCUVITE WITH LTEIN) CAPS Take 1 capsule by mouth daily   NITROSTAT 0.4 MG sublingual tablet DISSOLVE ONE TABLET UNDER THE TONGUE EVERY 5 MINUTES AS NEEDED FOR CHEST PAIN.  DO NOT EXCEED A TOTAL OF 3 DOSES IN 15 MINUTES. CALL 911.   omeprazole (PRILOSEC) 40 MG capsule TAKE ONE CAPSULE BY MOUTH ONCE DAILY   potassium chloride (K-TAB,KLOR-CON) 10 MEQ tablet Take 2 tabs in AM, 1 tab in PM (Patient taking differently: daily Take 2 tabs)   pravastatin (PRAVACHOL) 80 MG tablet Take 1 tablet (80 mg) by mouth At Bedtime   timolol (TIMOPTIC) 0.5 % ophthalmic solution Place 1 drop into both eyes daily    order for DME Equipment being ordered: Left hand and wrist brace (Patient not taking: Reported on 11/13/2018)     No current facility-administered medications on file prior to visit.   Allergies   Allergen Reactions     Bactrim [Sulfamethoxazole W/Trimethoprim] Other (See Comments)     Patient unable to recall     Biaxin [Clarithromycin]      Chlorthalidone Nausea and Vomiting     Clonidine      Codeine Sulfate Itching     Darvon [Propoxyphene Hcl]      Dilaudid [Hydromorphone] Visual Disturbance     Hallucinations       Gabapentin Other (See Comments) and Fatigue     \"felt drunk\"     Indocin      Levaquin [Levofloxacin Hemihydrate]      Lyrica Fatigue     Morphine Sulfate      Percocet [Oxycodone-Acetaminophen]      Spironolactone Other (See Comments)     Dehydrated       Terazosin      Ciprofloxacin Rash     Simvastatin Palpitations     Muscle weakness, " "leg cramping         Past Medical History:   Diagnosis Date     CAD (coronary artery disease)      Cardiac Pacemaker- Medtronic, dual chamber- NOT dependant 11/28/2007     CKD (chronic kidney disease), stage IV (H)      Hyperlipidemia LDL goal <70      Hypertension      Stented coronary artery 10-    RCA     Stented coronary artery 2-5-2003    LCx     Stented coronary artery 4-    LAD     Stented coronary artery 11-    LAD     Stented coronary artery 12-    RCA     Transient complete heart block (H) 11/28/2007     Past Surgical History:   Procedure Laterality Date     CHOLECYSTECTOMY       HC PPM INSERTION NEW/REPLACEMENT W/ ATRIAL&VENTRICULAR LEAD  11-     HYSTERECTOMY       Family History   Problem Relation Age of Onset     Cancer Sister 82     bladder cancer     Social History     Social History     Marital status:      Spouse name: N/A     Number of children: 4     Years of education: N/A     Occupational History      [employer]     Social History Main Topics     Smoking status: Former Smoker     Packs/day: 0.30     Years: 15.00     Types: Cigarettes     Smokeless tobacco: Never Used      Comment: Quit 22+ years ago     Alcohol use Not on file     Drug use: No     Sexual activity: Not Currently     Partners: Male     Birth control/ protection: Post-menopausal     Other Topics Concern     Blood Transfusions Yes     Exercise No     Social History Narrative     A complete review of systems is negative except as noted above in the HPI.    Physical Examination:  Vitals: /77 (BP Location: Left arm, Patient Position: Chair, Cuff Size: Adult Regular)  Pulse 68  Ht 1.702 m (5' 7\")  Wt 72.9 kg (160 lb 12.8 oz)  SpO2 100%  BMI 25.18 kg/m2  GENERAL APPEARANCE: comfortable appearing female with unlabored breathing at rest.  HEENT: no scleral icterus, no xanthelasmas  NECK:  JVP <7 cm.  CHEST: lungs clear to auscultation  CARDIOVASCULAR:  RRR with normal s1 and " s2, no murmur or rub, warm extremities, no peripheral edema.  ABDOMEN: soft, not tender  NEURO: alert and fully oriented, fluent speech.  SKIN: no peripheral petechiae/ecchymoses, not jaundiced    Relevant labs, imaging, and procedures were reviewed.  Recent Labs   Lab Test  08/15/18   1113  06/25/18   0942   CR  1.77*  1.75*   HGB  12.4  11.5*   CHOL   --   144   LDL   --   70   HDL   --   47*         Assessment and recommendations:  Ms Martinez is an 84 yo female with a dual chamber pacemaker implanted in 2007 for intermittent complete heart block and sinus node dysfunction who has been predominantly A-paced since then (89% Ap, 0.3%  on today's interrogation) and returns to discuss eventual generator replacement.  She is NOT pacemaker dependent.  Her intrinsic rhythm today is sinus rhythm at 51 bpm.  Her medical history is also notable for coronary artery disease s/p stenting of the LAD, RCA, and LCx; hypertension; and hyperlipidemia.  Her pacemaker & leads are functioning normally.  She has no symptoms of extra-cardiac stimulation.  She has no signs or symptoms of heart failure or angina.  We discussed the risks of generator replacement including bleeding, infection, and inadvertent injury to the leads necessitating lead replacement.  She is seeing Dr. Santoyo later this morning regarding her other cardiovascular care.  She will follow up with us at the time of generator replacement.    I discussed the patient with Dr. aMx Edwards.    Jerman Pickett MD  Cardiovascular disease fellow    CARDIOLOGY STAFF  Patient seen and examined by me.  History and physical examination discussed with Dr. Pickett whose note reflects our joint assessment and recommendation/plans.  We have been asked by Dr. Santoyo to see Tessa Mansfield. I have seen her, perhaps only once, in 2007 when I was asked to implant a dual chamber PPM for a 9.5 and and 11.5 second period of asystole due to transient complete heart block. She has CAD  and had had a LAD stent placed prior the PPM implant. The most recent device check on [facility] as of today was 8/21/2018 and showed normal function, estimated 27 months to LOLA. She saw Dr. Mir in November 2017 and was deemed to be doing well since her previous PCI on 7/13/2013. Her AF burden at that time was noted to be < 0.1%.  She had done very well from an arrhythmia standpoint with no syncope or presyncope.  Her pacemaker shows normal function.  Her pacemaker model tends to have an imprecise predication of battery life (estimating 8-45 months).  We discussed the procedure for generator replacement.  She has had several stents since we implanted her PPM but is currently without angina.  She is seeing Dr. Santoyo later today.  She will continue device follow-up and we will see her in the future when she needs a generator replacement.  It was a pleasure seeing Tessa Mansfield today.  Max Edwards

## 2018-11-13 NOTE — NURSING NOTE
Chief Complaint   Patient presents with     Follow Up For     Grace re-[facility] care. Implant device 2007- that was last consult.     Vitals were taken and medications were reconciled. EKG was performed.    Carito Mehta MA    10:30 AM

## 2018-11-13 NOTE — PATIENT INSTRUCTIONS
You were seen at the [facility] clinic today.  You saw Dr. Tyrell Santoyo  Here are your Instructions:    1. Do not stop Plavix-- have your Ophthalmologist contact Dr. Santoyo if he has questions.   2. Continue current medications.   3. Get compression stockings to help with leg swelling.  4. Record home BPs, bring recordings to Dr. Abbott  5. Follow up with Dr. Santoyo in 1 year.         If you have questions after this visit:  Send a [employer] message or contact Subhash Mason LPN  Office:  [phone] option #1, then #3 & ask for Subhash (nurse line)  Fax:  [phone]  After Hours:  [phone] option #4 ask to speak to he on-call Cardiologist  Appointments:  [phone] option #1, then option #1

## 2018-11-13 NOTE — MR AVS SNAPSHOT
After Visit Summary   11/13/2018    Tessa Mansfield    MRN: [id number]           Patient Information     Date Of Birth          1935        Visit Information        Provider Department      11/13/2018 10:00 AM 1,  Cv Device [facility]        Today's Diagnoses     Sinus node dysfunction (H)          Care Instructions    It was a pleasure to see you in clinic today. Please do not hesitate to call with any questions or concerns. You are scheduled for a remote Pacemaker transmission on 2/13/19.  We will call in 1-2 business days to discuss the results with you. We look forward to seeing you in clinic at your next device check in 1 year    Ruby Mann, RN  Electrophysiology Nurse Clinician  [facility]  During business hours call:  [phone]  After business hours please call: [phone]- select option #4 and ask for job code [phone].          Follow-ups after your visit        Additional Services     Follow-Up with Device Clinic - 1 year                 Your next 10 appointments already scheduled     Dec 19, 2018 12:50 PM CST   (Arrive by 12:35 PM)   Return Visit with Pedro Gomez MD   [facility] ([facility])    [address] [place] [address]  4th Floor  [facility] [id number] [phone]            Apr 17, 2019  9:30 AM CDT   Lab with  LAB   [facility] Lab ([facility])    [address] [place] [address]  1st Floor  [facility] [address]   [phone]            Apr 17, 2019 10:20 AM CDT   (Arrive by 9:50 AM)   Return Visit with Marzena Martin MD   [facility] Nephrology ([facility])    [address] [place] [address]  [address]  [facility] [address]   [phone]              Future tests that were ordered for you today     Open Future Orders        Priority Expected Expires Ordered    Follow-Up with Cardiologist Routine 11/13/2019 11/14/2019 11/13/2018     Follow-Up with Device Clinic - 1 year Routine 11/13/2019 11/14/2019 11/13/2018            Who to contact     Please call your clinic at No information on file. to:    Ask questions about your health    Make or cancel appointments    Discuss your medicines    Learn about your test results    Speak to your doctor            Additional Information About Your Visit        Care EveryWhere ID     This is your Care EveryWhere ID. This could be used by other organizations to access your [place] medical records  [id number]         Blood Pressure from Last 3 Encounters:   11/13/18 138/78   11/13/18 154/77   08/15/18 167/84    Weight from Last 3 Encounters:   11/13/18 72.9 kg (160 lb 12.8 oz)   11/13/18 72.9 kg (160 lb 12.8 oz)   08/15/18 68.2 kg (150 lb 6.4 oz)              We Performed the Following     (79824) PM DEVICE PROGRAMMING EVAL, DUAL LEAD PACER     Follow-Up with Device Clinic-6 months          Today's Medication Changes          These changes are accurate as of 11/13/18  6:48 PM.  If you have any questions, ask your nurse or doctor.               These medicines have changed or have updated prescriptions.        Dose/Directions    potassium chloride 10 MEQ tablet   Commonly known as:  K-TAB,KLOR-CON   This may have changed:    - when to take this  - additional instructions   Used for:  Hypokalemia        Take 2 tabs in AM, 1 tab in PM   Quantity:  270 tablet   Refills:  3                Primary Care Provider Office Phone # Fax #    Pedro Gomez [phone]       [address] [address]        Equal Access to Services     FAYE GUILLORY AH: Lis Gross, waaxda luqadaha, qaybta kaalmada [place] franco adezac fisher. So [facility] [phone].    ATENCIÓN: Si habla español, tiene a braga disposición servicios gratuitos de asistencia lingüística. Llame al [phone].    We comply with applicable federal civil rights laws and [place] laws.  We do not discriminate on the basis of race, color, national origin, age, disability, sex, sexual orientation, or gender identity.            Thank you!     Thank you for choosing [facility]  for your care. Our goal is always to provide you with excellent care. Hearing back from our patients is one way we can continue to improve our services. Please take a few minutes to complete the written survey that you may receive in the mail after your visit with us. Thank you!             Your Updated Medication List - Protect others around you: Learn how to safely use, store and throw away your medicines at www.disposemymeds.org.          This list is accurate as of 11/13/18  6:48 PM.  Always use your most recent med list.                   Brand Name Dispense Instructions for use Diagnosis    allopurinol 100 MG tablet    ZYLOPRIM    90 tablet    Take 1 tablet (100 mg) by mouth daily    Gout       amLODIPine 2.5 MG tablet    NORVASC    30 tablet    Take 1 tablet (2.5 mg) by mouth daily    Essential hypertension       aspirin 81 MG tablet      Take 1 tablet by mouth daily.        clopidogrel 75 MG tablet    PLAVIX    90 tablet    Take 1 tablet (75 mg) by mouth daily    NSTEMI (non-ST elevated myocardial infarction) (H), S/P angioplasty with stent       cyanocobalamin 1000 MCG tablet    vitamin  B-12     Take 500 mcg by mouth daily As directed        furosemide 40 MG tablet    LASIX    90 tablet    TAKE ONE TABLET BY MOUTH ONCE DAILY    Essential hypertension       isosorbide mononitrate 20 MG tablet    ISMO/MONOKET    540 tablet    Take 2 tablets (40 mg) by mouth 3 times daily Please make a follow up appointment for further refills.    Coronary artery disease involving native coronary artery of native heart without angina pectoris, Chest pain       metoprolol tartrate 100 MG tablet    LOPRESSOR    180 tablet    Take 1 tablet (100 mg) by mouth 2 times daily    Essential hypertension       multivitamin  with lutein  Caps per capsule      Take 1 capsule by mouth daily        NITROSTAT 0.4 MG sublingual tablet   Generic drug:  nitroGLYcerin     25 tablet    DISSOLVE ONE TABLET UNDER THE TONGUE EVERY 5 MINUTES AS NEEDED FOR CHEST PAIN.  DO NOT EXCEED A TOTAL OF 3 DOSES IN 15 MINUTES. CALL 911.    CAD (coronary artery disease)       omeprazole 40 MG capsule    priLOSEC    90 capsule    TAKE ONE CAPSULE BY MOUTH ONCE DAILY    Esophageal reflux       order for DME     1 each    Equipment being ordered: Left hand and wrist brace    Pain of left hand, Fall from standing, initial encounter       potassium chloride 10 MEQ tablet    K-TAB,KLOR-CON    270 tablet    Take 2 tabs in AM, 1 tab in PM    Hypokalemia       pravastatin 80 MG tablet    PRAVACHOL    90 tablet    Take 1 tablet (80 mg) by mouth At Bedtime    Hyperlipidemia LDL goal <130       timolol 0.5 % ophthalmic solution    TIMOPTIC     Place 1 drop into both eyes daily

## 2018-12-10 ENCOUNTER — TELEPHONE (OUTPATIENT)
Dept: CARDIOLOGY | Facility: CLINIC | Age: 83
End: 2018-12-10

## 2018-12-10 DIAGNOSIS — K21.9 ESOPHAGEAL REFLUX: ICD-10-CM

## 2018-12-10 DIAGNOSIS — M10.9 GOUT: ICD-10-CM

## 2018-12-10 RX ORDER — OMEPRAZOLE 40 MG/1
CAPSULE, DELAYED RELEASE ORAL
Qty: 90 CAPSULE | Refills: 3 | Status: SHIPPED | OUTPATIENT
Start: 2018-12-10 | End: 2019-12-10

## 2018-12-10 NOTE — TELEPHONE ENCOUNTER
[facility] Call Center    Phone Message    May a detailed message be left on voicemail: yes    Reason for Call: Medication Refill Request    Has the patient contacted the pharmacy for the refill? Yes   Name of medication being requested: omeprazole (PRILOSEC) 40 MG     Provider who prescribed the medication: Dr. Santoyo  Pharmacy: [facility] in [place]  Date medication is needed: ASAP         Action Taken: Message routed to:  [facility] p cardiology

## 2018-12-17 DIAGNOSIS — I10 ESSENTIAL HYPERTENSION: ICD-10-CM

## 2018-12-17 RX ORDER — METOPROLOL TARTRATE 100 MG
100 TABLET ORAL 2 TIMES DAILY
Qty: 180 TABLET | Refills: 3 | Status: SHIPPED | OUTPATIENT
Start: 2018-12-17 | End: 2019-12-17

## 2018-12-19 ENCOUNTER — OFFICE VISIT (OUTPATIENT)
Dept: FAMILY MEDICINE | Facility: CLINIC | Age: 83
End: 2018-12-19
Payer: COMMERCIAL

## 2018-12-19 VITALS
BODY MASS INDEX: 25.55 KG/M2 | WEIGHT: 163.1 LBS | SYSTOLIC BLOOD PRESSURE: 175 MMHG | HEART RATE: 75 BPM | DIASTOLIC BLOOD PRESSURE: 93 MMHG

## 2018-12-19 DIAGNOSIS — I25.10 CORONARY ARTERY DISEASE INVOLVING NATIVE CORONARY ARTERY OF NATIVE HEART WITHOUT ANGINA PECTORIS: ICD-10-CM

## 2018-12-19 DIAGNOSIS — E78.5 HYPERLIPIDEMIA LDL GOAL <130: ICD-10-CM

## 2018-12-19 DIAGNOSIS — I88.9 LYMPHADENITIS: Primary | ICD-10-CM

## 2018-12-19 DIAGNOSIS — M10.00 IDIOPATHIC GOUT, UNSPECIFIED CHRONICITY, UNSPECIFIED SITE: ICD-10-CM

## 2018-12-19 DIAGNOSIS — R07.9 CHEST PAIN, UNSPECIFIED TYPE: ICD-10-CM

## 2018-12-19 DIAGNOSIS — I10 BENIGN ESSENTIAL HYPERTENSION: ICD-10-CM

## 2018-12-19 RX ORDER — ALLOPURINOL 100 MG/1
100 TABLET ORAL DAILY
Qty: 90 TABLET | Refills: 3 | Status: SHIPPED | OUTPATIENT
Start: 2018-12-19 | End: 2019-12-17

## 2018-12-19 RX ORDER — ISOSORBIDE MONONITRATE 20 MG/1
40 TABLET ORAL 3 TIMES DAILY
Qty: 540 TABLET | Refills: 1 | Status: SHIPPED | OUTPATIENT
Start: 2018-12-19 | End: 2019-11-08

## 2018-12-19 RX ORDER — AMLODIPINE BESYLATE 5 MG/1
5 TABLET ORAL DAILY
Qty: 90 TABLET | Refills: 3 | Status: SHIPPED | OUTPATIENT
Start: 2018-12-19 | End: 2019-04-17

## 2018-12-19 RX ORDER — PRAVASTATIN SODIUM 80 MG/1
80 TABLET ORAL AT BEDTIME
Qty: 90 TABLET | Refills: 0 | Status: SHIPPED | OUTPATIENT
Start: 2018-12-19 | End: 2019-04-12

## 2018-12-19 ASSESSMENT — PAIN SCALES - GENERAL: PAINLEVEL: NO PAIN (0)

## 2018-12-19 NOTE — PATIENT INSTRUCTIONS
[facility] [phone] ([facility], 4th Floor N)     Prior to receiving the vaccine, we recommend that you call your insurance carrier and ask them the following questions:    1) Does my insurance cover the Shingrix vaccine and administration of the vaccine?  2) What is my co-pay or deductible for the vaccine?  3) Is there a cost difference if I receive the vaccine at my doctors office or a pharmacy?    The clinic cannot determine your insurance benefits. Please call your insurance provider prior to scheduling an appointment to receive the Shingrix vaccine. If you decide the receive your vaccine at the [facility], please call [phone] and request a nurse only appointment for the Shingrix vaccine. The vaccine comes in 2 doses. Your second dose should be 2-6 months from your first Shingrix injection.     Nurse visit hours are available Monday, Wednesday and Friday from 9-11:00 am and 1-3:00 pm.

## 2018-12-19 NOTE — NURSING NOTE
Chief Complaint   Patient presents with     Swelling     On left side of face for past 3 days, last Wednesday had spot on check in general area frozen        Anjelica Nuñez LPN at 12:42 PM on December 19, 2018

## 2018-12-19 NOTE — PROGRESS NOTES
[facility]  Primary Care Center   Pedro Gomez MD  12/19/2018      Chief Complaint:   Swelling     History of Present Illness:   Tessa Mansfield is a 83 year old female with a history of NSTEMI, coronary artery disease with stent placement, transient complete heart block, arrhythmia, hypertension with a pacemaker in place who presents for evaluation of swelling.    Swelling   The patient reports that she has a lump on the left side of her face. The patient states that she had a spot on her face frozen with cryotherapy, and the spot appeared after that. It is not tender.       S/p ORIF of hip   The patient had this done on 03/18 with Dr. Escobar Mccabe. She last saw him on 12/04. She reports numbness on the lateral aspect of her thigh, starting right after her surgery. She still has pain on that side as well, worse with palpation and with walking. Dr. Mccabe asked her to come back in one year for follow up, and he ordered Calcitonin for her, which he thought might help.     Blood Pressure  The patient's BP medication was switched to Amlodipine and heard on the news that it causes cancer, so she is wondering if she should take it. Her blood pressure has been high, she reports, despite continuing to take amlodipine since August. Her lower legs have been swelling more with amlodipine, but it is not a significant amount and is not bothersome to her.     Other concerns discussed:  1) Shingrix - Called insurance company and they told her that the first one they would pay for it and they second shot she would have to pay for it herself. She will call them back about this, and so does not want this today.     2) Gout - Patient states that her gout is under control but she needs a refill of her allopurinol.     3) Need for eye surgery - She states that her left eye lid is drooping and she has lost a notable amount of peripheral vision. She states that she is on the Plavix, and is wondering if she needs to continue with this  to have the eye surgery or if she can stop it. Dr. Santoyo's note from 11/13 states that she needs to stay on the Plavix--it cannot be held in absence of acute or life threatening bleeding, per his note. However, he feels that she is fine to go forward with the surgery as long as she stays on the Plavix.      Review of Systems:   Pertinent items are noted in HPI, remainder of complete ROS is negative.      Active Medications:      allopurinol (ZYLOPRIM) 100 MG tablet, Take 1 tablet (100 mg) by mouth daily, Disp: 90 tablet, Rfl: 3     amLODIPine (NORVASC) 2.5 MG tablet, Take 1 tablet (2.5 mg) by mouth daily, Disp: 30 tablet, Rfl: 11     aspirin 81 MG tablet, Take 1 tablet by mouth daily., Disp: , Rfl:      clopidogrel (PLAVIX) 75 MG tablet, Take 1 tablet (75 mg) by mouth daily, Disp: 90 tablet, Rfl: 1     cyanocolbalamin (VITAMIN B-12) 1000 MCG tablet, Take 500 mcg by mouth daily As directed, Disp: , Rfl:      furosemide (LASIX) 40 MG tablet, TAKE ONE TABLET BY MOUTH ONCE DAILY, Disp: 90 tablet, Rfl: 3     isosorbide mononitrate (ISMO/MONOKET) 20 MG tablet, Take 2 tablets (40 mg) by mouth 3 times daily Please make a follow up appointment for further refills., Disp: 540 tablet, Rfl: 1     metoprolol tartrate (LOPRESSOR) 100 MG tablet, Take 1 tablet (100 mg) by mouth 2 times daily, Disp: 180 tablet, Rfl: 3     multivitamin  with lutein (OCUVITE WITH LTEIN) CAPS, Take 1 capsule by mouth daily, Disp: , Rfl:      NITROSTAT 0.4 MG sublingual tablet, DISSOLVE ONE TABLET UNDER THE TONGUE EVERY 5 MINUTES AS NEEDED FOR CHEST PAIN.  DO NOT EXCEED A TOTAL OF 3 DOSES IN 15 MINUTES. CALL 911., Disp: 25 tablet, Rfl: 3     omeprazole (PRILOSEC) 40 MG DR capsule, TAKE ONE CAPSULE BY MOUTH ONCE DAILY, Disp: 90 capsule, Rfl: 3     potassium chloride (K-TAB,KLOR-CON) 10 MEQ tablet, Take 2 tabs in AM, 1 tab in PM (Patient taking differently: daily Take 2 tabs), Disp: 270 tablet, Rfl: 3     pravastatin (PRAVACHOL) 80 MG tablet, Take 1  tablet (80 mg) by mouth At Bedtime, Disp: 90 tablet, Rfl: 0     timolol (TIMOPTIC) 0.5 % ophthalmic solution, Place 1 drop into both eyes daily , Disp: , Rfl:       Allergies:   Bactrim [sulfamethoxazole w/trimethoprim]; Biaxin [clarithromycin]; Chlorthalidone; Clonidine; Codeine sulfate; Darvon [propoxyphene hcl]; Dilaudid [hydromorphone]; Gabapentin; Indocin; Levaquin [levofloxacin hemihydrate]; Lyrica; Morphine sulfate; Percocet [oxycodone-acetaminophen]; Spironolactone; Terazosin; Ciprofloxacin; and Simvastatin      Past Medical History:  Coronary artery disease s/p PCI + BMS to MRCA, PCI to mLCX, PCI+ALVAREZ x2 to pLAD, PCI + DESx1 to dRCA, PCI + ALVAREZ to RPDA on indefinite DAPT   Cardiac pacemaker  Chronic Kidney Disease   Hyperlipidemia    Hypertension   Transient complete heart block   Degeneration of cervical intervertebral disc   Nonallopathic lesion of cervical region  Nonallopathic lesion of thoracic region  Esophageal reflux   Obstructive Sleep Apnea   Osteomalacia   Postmenopausal osteoporosis   NSTEMI   Arrhythmia   Sinus node dysfunction      Past Surgical History:  Cholecystectomy    PPM insertion New/replacement w/ Atrial and Ventricular Lead  Hysterectomy     Family History:   Bladder cancer - Sister       Social History:   The patient was alone.  Smoking Status: Former smoker 0.3 PPD for 15 years   Smokeless Tobacco: Never     Marital Status:       Physical Exam:   BP (!) 175/93   Pulse 75   Wt 74 kg (163 lb 1.6 oz)   BMI 25.55 kg/m       Constitutional: Alert. In no distress.  Head: Normocephalic. No masses, lesions, tenderness or abnormalities.  ENT: No neck nodes or sinus tenderness. Left submental 2cm lymph node which is mobile and non tender and not erythematous.   Cardiovascular: RRR. No murmurs, clicks, gallops, or rub.  Respiratory: Clear to auscultation bilaterally, no wheezes or crackles.  Gastrointestinal: Abdomen soft. Non-tender. BS normal. No masses or  organomegaly.  Musculoskeletal: Extremities normal. No gross deformities noted. Normal muscle tone.  Skin: No suspicious lesions. No rashes.  Neurologic: Gait normal. Reflexes normal and symmetric. Sensation grossly WNL.  Psychiatric: Mentation appears normal. Normal affect.   Hematologic/Lymphatic/Immunologic: Normal cervical lymph nodes. No adenopathy except for       Assessment and Plan:  Gout  Well controlled  - allopurinol (ZYLOPRIM) 100 MG tablet  Dispense: 90 tablet; Refill: 3    Hyperlipidemia LDL goal <130  Well controlled  - pravastatin (PRAVACHOL) 80 MG tablet  Dispense: 90 tablet; Refill: 0    Coronary artery disease involving native coronary artery of native heart without angina pectoris  No symptoms   - isosorbide mononitrate (ISMO/MONOKET) 20 MG tablet  Dispense: 540 tablet; Refill: 1    Chest pain  - isosorbide mononitrate (ISMO/MONOKET) 20 MG tablet  Dispense: 540 tablet; Refill: 1    Lymphadenitis  She will call after [place] if no better  - cephALEXin (KEFLEX) 250 MG capsule  Dispense: 21 capsule; Refill: 0    Benign essential hypertension  Is having mild edema which is tolerable. Will try slightly higher dose for goal BP's. However, if too much swelling she erin let ultrasoundknow  - amLODIPine (NORVASC) 5 MG tablet  Dispense: 90 tablet; Refill: 3     Right leg meralgia paresthetica   I gave her a handout and she is happy watching  This.     Follow-up: Return in about 6 months (around 6/19/2019).         Scribe Disclosure:   I, Toña Cervantes, am serving as a scribe to document services personally performed by Pedro Gomez MD at this visit, based upon the provider's statements to me. All documentation has been reviewed by the aforementioned provider prior to being entered into the official medical record.     Portions of this medical record were completed by a scribe. UPON MY REVIEW AND AUTHENTICATION BY ELECTRONIC SIGNATURE, this confirms (a) I performed the applicable clinical services,  and (b) the record is accurate.   Pedro Gomez MD

## 2019-01-02 ENCOUNTER — TELEPHONE (OUTPATIENT)
Dept: FAMILY MEDICINE | Facility: CLINIC | Age: 84
End: 2019-01-02

## 2019-01-02 NOTE — TELEPHONE ENCOUNTER
Reduce to previous dose of 2.5 mg/day.    I've messaged her nephrologist for advice on further BP meds, await that.

## 2019-01-02 NOTE — TELEPHONE ENCOUNTER
Spoke to patient to relay providers message. Patient stated verbal understanding. Cass Bhat LPN 1/2/2019 3:39 PM

## 2019-01-02 NOTE — TELEPHONE ENCOUNTER
[facility] Call Center    Phone Message    May a detailed message be left on voicemail: yes    Reason for Call: Other: Per Pt is wanting to let DIALLO Mondragon know that about her legs and feet swelling for the past 3 days due to increased medicaiton of amLODIPine (NORVASC) 5 MG tablet . Pt is wanting to know if she should continue to take medication or stop it. Pt remmebers doctor saying that might happen, but Pt forgets if she is supposed to stop taking medicaiton or not.       Action Taken: Message routed to:  [facility] Pcc

## 2019-01-05 DIAGNOSIS — E87.6 HYPOKALEMIA: ICD-10-CM

## 2019-01-07 RX ORDER — POTASSIUM CHLORIDE 750 MG/1
TABLET, EXTENDED RELEASE ORAL
Qty: 270 TABLET | Refills: 3 | Status: SHIPPED | OUTPATIENT
Start: 2019-01-07 | End: 2019-11-16

## 2019-01-07 NOTE — TELEPHONE ENCOUNTER
Last Office Visit with Nephrologist:  8/15/2018.  Medication refilled per Nephrology Clinic protocol.    Jared Marquez RN

## 2019-01-12 DIAGNOSIS — Z95.820 S/P ANGIOPLASTY WITH STENT: ICD-10-CM

## 2019-01-12 DIAGNOSIS — I21.4 NSTEMI (NON-ST ELEVATED MYOCARDIAL INFARCTION) (H): ICD-10-CM

## 2019-01-14 ENCOUNTER — TRANSFERRED RECORDS (OUTPATIENT)
Dept: HEALTH INFORMATION MANAGEMENT | Facility: CLINIC | Age: 84
End: 2019-01-14

## 2019-01-14 NOTE — TELEPHONE ENCOUNTER
clopidogrel (PLAVIX) 75 MG    Last Written Prescription Date:  7/17/18  Last Fill Quantity: 90,   # refills: 1  Last Office Visit : 12/19/18  Future Office visit:  6/19/19    Routing refill request to provider for review/approval because:  FAILED PROTOCOL

## 2019-01-15 RX ORDER — CLOPIDOGREL BISULFATE 75 MG/1
75 TABLET ORAL DAILY
Qty: 90 TABLET | Refills: 1 | Status: SHIPPED | OUTPATIENT
Start: 2019-01-15 | End: 2019-07-16

## 2019-02-13 ENCOUNTER — ANCILLARY PROCEDURE (OUTPATIENT)
Dept: CARDIOLOGY | Facility: CLINIC | Age: 84
End: 2019-02-13
Attending: INTERNAL MEDICINE
Payer: COMMERCIAL

## 2019-02-13 DIAGNOSIS — I49.5 SICK SINUS SYNDROME (H): ICD-10-CM

## 2019-02-13 PROCEDURE — 93296 REM INTERROG EVL PM/IDS: CPT | Mod: ZF

## 2019-02-13 PROCEDURE — 93294 REM INTERROG EVL PM/LDLS PM: CPT | Performed by: INTERNAL MEDICINE

## 2019-03-09 LAB
MDC_IDC_LEAD_IMPLANT_DT: NORMAL
MDC_IDC_LEAD_IMPLANT_DT: NORMAL
MDC_IDC_LEAD_LOCATION: NORMAL
MDC_IDC_LEAD_LOCATION: NORMAL
MDC_IDC_LEAD_LOCATION_DETAIL_1: NORMAL
MDC_IDC_LEAD_LOCATION_DETAIL_1: NORMAL
MDC_IDC_LEAD_MFG: NORMAL
MDC_IDC_LEAD_MFG: NORMAL
MDC_IDC_LEAD_MODEL: NORMAL
MDC_IDC_LEAD_MODEL: NORMAL
MDC_IDC_LEAD_POLARITY_TYPE: NORMAL
MDC_IDC_LEAD_POLARITY_TYPE: NORMAL
MDC_IDC_LEAD_SERIAL: NORMAL
MDC_IDC_LEAD_SERIAL: NORMAL
MDC_IDC_MSMT_BATTERY_DTM: NORMAL
MDC_IDC_MSMT_BATTERY_IMPEDANCE: 2897 OHM
MDC_IDC_MSMT_BATTERY_REMAINING_LONGEVITY: 19 MO
MDC_IDC_MSMT_BATTERY_STATUS: NORMAL
MDC_IDC_MSMT_BATTERY_VOLTAGE: 2.69 V
MDC_IDC_MSMT_LEADCHNL_RA_IMPEDANCE_VALUE: 428 OHM
MDC_IDC_MSMT_LEADCHNL_RA_PACING_THRESHOLD_AMPLITUDE: 0.5 V
MDC_IDC_MSMT_LEADCHNL_RA_PACING_THRESHOLD_PULSEWIDTH: 0.4 MS
MDC_IDC_MSMT_LEADCHNL_RV_IMPEDANCE_VALUE: 515 OHM
MDC_IDC_MSMT_LEADCHNL_RV_PACING_THRESHOLD_AMPLITUDE: 1 V
MDC_IDC_MSMT_LEADCHNL_RV_PACING_THRESHOLD_PULSEWIDTH: 0.4 MS
MDC_IDC_PG_IMPLANT_DTM: NORMAL
MDC_IDC_PG_MFG: NORMAL
MDC_IDC_PG_MODEL: NORMAL
MDC_IDC_PG_SERIAL: NORMAL
MDC_IDC_PG_TYPE: NORMAL
MDC_IDC_SESS_CLINIC_NAME: NORMAL
MDC_IDC_SESS_DTM: NORMAL
MDC_IDC_SESS_TYPE: NORMAL
MDC_IDC_SET_BRADY_AT_MODE_SWITCH_MODE: NORMAL
MDC_IDC_SET_BRADY_AT_MODE_SWITCH_RATE: 175 {BEATS}/MIN
MDC_IDC_SET_BRADY_LOWRATE: 60 {BEATS}/MIN
MDC_IDC_SET_BRADY_MAX_SENSOR_RATE: 130 {BEATS}/MIN
MDC_IDC_SET_BRADY_MAX_TRACKING_RATE: 130 {BEATS}/MIN
MDC_IDC_SET_BRADY_MODE: NORMAL
MDC_IDC_SET_BRADY_PAV_DELAY_LOW: 150 MS
MDC_IDC_SET_BRADY_SAV_DELAY_LOW: 120 MS
MDC_IDC_SET_LEADCHNL_RA_PACING_AMPLITUDE: 1.5 V
MDC_IDC_SET_LEADCHNL_RA_PACING_ANODE_ELECTRODE_1: NORMAL
MDC_IDC_SET_LEADCHNL_RA_PACING_ANODE_LOCATION_1: NORMAL
MDC_IDC_SET_LEADCHNL_RA_PACING_CAPTURE_MODE: NORMAL
MDC_IDC_SET_LEADCHNL_RA_PACING_CATHODE_ELECTRODE_1: NORMAL
MDC_IDC_SET_LEADCHNL_RA_PACING_CATHODE_LOCATION_1: NORMAL
MDC_IDC_SET_LEADCHNL_RA_PACING_POLARITY: NORMAL
MDC_IDC_SET_LEADCHNL_RA_PACING_PULSEWIDTH: 0.4 MS
MDC_IDC_SET_LEADCHNL_RA_SENSING_ANODE_ELECTRODE_1: NORMAL
MDC_IDC_SET_LEADCHNL_RA_SENSING_ANODE_LOCATION_1: NORMAL
MDC_IDC_SET_LEADCHNL_RA_SENSING_CATHODE_ELECTRODE_1: NORMAL
MDC_IDC_SET_LEADCHNL_RA_SENSING_CATHODE_LOCATION_1: NORMAL
MDC_IDC_SET_LEADCHNL_RA_SENSING_POLARITY: NORMAL
MDC_IDC_SET_LEADCHNL_RA_SENSING_SENSITIVITY: 0.5 MV
MDC_IDC_SET_LEADCHNL_RV_PACING_AMPLITUDE: 2 V
MDC_IDC_SET_LEADCHNL_RV_PACING_ANODE_ELECTRODE_1: NORMAL
MDC_IDC_SET_LEADCHNL_RV_PACING_ANODE_LOCATION_1: NORMAL
MDC_IDC_SET_LEADCHNL_RV_PACING_CAPTURE_MODE: NORMAL
MDC_IDC_SET_LEADCHNL_RV_PACING_CATHODE_ELECTRODE_1: NORMAL
MDC_IDC_SET_LEADCHNL_RV_PACING_CATHODE_LOCATION_1: NORMAL
MDC_IDC_SET_LEADCHNL_RV_PACING_POLARITY: NORMAL
MDC_IDC_SET_LEADCHNL_RV_PACING_PULSEWIDTH: 0.4 MS
MDC_IDC_SET_LEADCHNL_RV_SENSING_ANODE_ELECTRODE_1: NORMAL
MDC_IDC_SET_LEADCHNL_RV_SENSING_ANODE_LOCATION_1: NORMAL
MDC_IDC_SET_LEADCHNL_RV_SENSING_CATHODE_ELECTRODE_1: NORMAL
MDC_IDC_SET_LEADCHNL_RV_SENSING_CATHODE_LOCATION_1: NORMAL
MDC_IDC_SET_LEADCHNL_RV_SENSING_POLARITY: NORMAL
MDC_IDC_SET_LEADCHNL_RV_SENSING_SENSITIVITY: 5.6 MV
MDC_IDC_SET_ZONE_DETECTION_INTERVAL: 333.33 MS
MDC_IDC_SET_ZONE_DETECTION_INTERVAL: 342.86 MS
MDC_IDC_SET_ZONE_TYPE: NORMAL
MDC_IDC_SET_ZONE_TYPE: NORMAL
MDC_IDC_STAT_AT_BURDEN_PERCENT: 0 %
MDC_IDC_STAT_AT_DTM_END: NORMAL
MDC_IDC_STAT_AT_DTM_START: NORMAL
MDC_IDC_STAT_AT_MODE_SW_COUNT: 0
MDC_IDC_STAT_BRADY_AP_VP_PERCENT: 0 %
MDC_IDC_STAT_BRADY_AP_VS_PERCENT: 91 %
MDC_IDC_STAT_BRADY_AS_VP_PERCENT: 0 %
MDC_IDC_STAT_BRADY_AS_VS_PERCENT: 9 %
MDC_IDC_STAT_BRADY_DTM_END: NORMAL
MDC_IDC_STAT_BRADY_DTM_START: NORMAL
MDC_IDC_STAT_EPISODE_RECENT_COUNT: 0
MDC_IDC_STAT_EPISODE_RECENT_COUNT: 0
MDC_IDC_STAT_EPISODE_RECENT_COUNT_DTM_END: NORMAL
MDC_IDC_STAT_EPISODE_RECENT_COUNT_DTM_END: NORMAL
MDC_IDC_STAT_EPISODE_RECENT_COUNT_DTM_START: NORMAL
MDC_IDC_STAT_EPISODE_RECENT_COUNT_DTM_START: NORMAL
MDC_IDC_STAT_EPISODE_TYPE: NORMAL
MDC_IDC_STAT_EPISODE_TYPE: NORMAL

## 2019-04-12 DIAGNOSIS — E78.5 HYPERLIPIDEMIA LDL GOAL <130: ICD-10-CM

## 2019-04-15 RX ORDER — PRAVASTATIN SODIUM 80 MG/1
TABLET ORAL
Qty: 90 TABLET | Refills: 2 | Status: SHIPPED | OUTPATIENT
Start: 2019-04-15 | End: 2020-01-10

## 2019-04-17 ENCOUNTER — OFFICE VISIT (OUTPATIENT)
Dept: NEPHROLOGY | Facility: CLINIC | Age: 84
End: 2019-04-17
Attending: INTERNAL MEDICINE
Payer: COMMERCIAL

## 2019-04-17 VITALS
TEMPERATURE: 97.4 F | BODY MASS INDEX: 26.27 KG/M2 | HEART RATE: 78 BPM | SYSTOLIC BLOOD PRESSURE: 170 MMHG | DIASTOLIC BLOOD PRESSURE: 82 MMHG | OXYGEN SATURATION: 98 % | WEIGHT: 167.4 LBS | HEIGHT: 67 IN

## 2019-04-17 DIAGNOSIS — N18.4 CHRONIC KIDNEY DISEASE, STAGE IV (SEVERE) (H): Primary | ICD-10-CM

## 2019-04-17 DIAGNOSIS — N18.4 CKD (CHRONIC KIDNEY DISEASE) STAGE 4, GFR 15-29 ML/MIN (H): ICD-10-CM

## 2019-04-17 DIAGNOSIS — I10 ESSENTIAL HYPERTENSION: ICD-10-CM

## 2019-04-17 DIAGNOSIS — N18.4 CKD (CHRONIC KIDNEY DISEASE) STAGE 4, GFR 15-29 ML/MIN (H): Primary | ICD-10-CM

## 2019-04-17 LAB
ALBUMIN SERPL-MCNC: 3.6 G/DL (ref 3.4–5)
ANION GAP SERPL CALCULATED.3IONS-SCNC: 4 MMOL/L (ref 3–14)
BUN SERPL-MCNC: 32 MG/DL (ref 7–30)
CALCIUM SERPL-MCNC: 8.8 MG/DL (ref 8.5–10.1)
CHLORIDE SERPL-SCNC: 109 MMOL/L (ref 94–109)
CO2 SERPL-SCNC: 26 MMOL/L (ref 20–32)
CREAT SERPL-MCNC: 1.8 MG/DL (ref 0.52–1.04)
CREAT UR-MCNC: 16 MG/DL
DEPRECATED CALCIDIOL+CALCIFEROL SERPL-MC: 23 UG/L (ref 20–75)
GFR SERPL CREATININE-BSD FRML MDRD: 26 ML/MIN/{1.73_M2}
GLUCOSE SERPL-MCNC: 67 MG/DL (ref 70–99)
HGB BLD-MCNC: 12.6 G/DL (ref 11.7–15.7)
MAGNESIUM SERPL-MCNC: 2.4 MG/DL (ref 1.6–2.3)
PHOSPHATE SERPL-MCNC: 2.8 MG/DL (ref 2.5–4.5)
POTASSIUM SERPL-SCNC: 3.8 MMOL/L (ref 3.4–5.3)
PROT UR-MCNC: 0.07 G/L
PROT/CREAT 24H UR: 0.44 G/G CR (ref 0–0.2)
PTH-INTACT SERPL-MCNC: 184 PG/ML (ref 18–80)
SODIUM SERPL-SCNC: 139 MMOL/L (ref 133–144)

## 2019-04-17 PROCEDURE — 83735 ASSAY OF MAGNESIUM: CPT | Performed by: INTERNAL MEDICINE

## 2019-04-17 PROCEDURE — 84156 ASSAY OF PROTEIN URINE: CPT | Performed by: INTERNAL MEDICINE

## 2019-04-17 PROCEDURE — 85018 HEMOGLOBIN: CPT | Performed by: INTERNAL MEDICINE

## 2019-04-17 PROCEDURE — 36415 COLL VENOUS BLD VENIPUNCTURE: CPT | Performed by: INTERNAL MEDICINE

## 2019-04-17 PROCEDURE — G0463 HOSPITAL OUTPT CLINIC VISIT: HCPCS | Mod: ZF

## 2019-04-17 PROCEDURE — 80069 RENAL FUNCTION PANEL: CPT | Performed by: INTERNAL MEDICINE

## 2019-04-17 PROCEDURE — 83970 ASSAY OF PARATHORMONE: CPT | Performed by: INTERNAL MEDICINE

## 2019-04-17 PROCEDURE — 82306 VITAMIN D 25 HYDROXY: CPT | Performed by: INTERNAL MEDICINE

## 2019-04-17 RX ORDER — FUROSEMIDE 40 MG
40 TABLET ORAL 2 TIMES DAILY
Qty: 60 TABLET | Refills: 3 | Status: SHIPPED | OUTPATIENT
Start: 2019-04-17 | End: 2019-04-24

## 2019-04-17 ASSESSMENT — PAIN SCALES - GENERAL: PAINLEVEL: NO PAIN (0)

## 2019-04-17 ASSESSMENT — MIFFLIN-ST. JEOR: SCORE: 1251.95

## 2019-04-17 NOTE — NURSING NOTE
"Chief Complaint   Patient presents with     RECHECK     CKD stage 4     /81   Pulse 78   Temp 97.4  F (36.3  C) (Oral)   Ht 1.702 m (5' 7\")   Wt 75.9 kg (167 lb 6.4 oz)   SpO2 98%   BMI 26.22 kg/m    Nelda Christopher CMA    "

## 2019-04-17 NOTE — PROGRESS NOTES
Assessment and Plan:   84 year old female with history of long standing HTN, CAD s/p multiple stents, who presents for followup of CKD stage 3, baseline SCr 1.8-2.2 mg/dL without proteinuria.    1. Stage III CKD - baseline SCr 1.8-2.2mg/ dL, which is stable today at 1.8mg/dL. Mild proteinuria is stable, UPCR is 0.44 g/gCr.  - RTC 3 months    2. Electrolytes/Acid Base status-no pressing issues    3. Hypertension/Volume status-elevated, not controlled BP is 170/82 mmHG. She is on metoprolol 100 mg BID, furosemide 40 mg nce a day, Amlodipine 2.5 mg daily and isosorbide 40 BID. Patient appears volume expanded on exam given lower extremity edema. She gained 17 lbs since 08/18. Last 2D ECHO in 07/2013 showed normal LVF 65 %, no significant valvular abnormalities, normal RV function.  -patient reports history of reaction/iontolerance to Spironolactone, Chlorthalidone, Clonidine and Terazosin in the past   -we will increase Lasix dose to 40 mg BID. We will repeat BMP in 2 weeks  -we will repeat 2D ECHO  -we will con't current dose Metoprolol, Amlodipine and Isosorbide  -patient was advised to monitor her BP daily and call nephrology clinic if her BP is persistently above 140/90 mmHG      4. Anemia- Improved. Hgb is 12.4 g/dl. Iron studies were not recently checked    5. BMD  -normal serum calcium and phosphorus  -secondary hyperparathyroidism-PTH is 184. Patient had PTH of 144 in 11/17. We will follow  - vitamin D level is pending      Assessment and plan was discussed with patient and she voiced her understanding and agreement.    Patient was staffed with DR.Boumitri Ashley Andersen MD  Nephrology Fellow  [facility]  Department of Medicine  Division of Renal Disease and Hypertension  [phone]      Reason for Visit:  Tessa Mansfield is a 84 year old female with HTN, who presents for CKD management.     HPI:  She is a pleasant female with PMMHx significant for stage III CKD presumed secondary to  hypertensive nephrosclerosis.Her renal function has been  relatively stable, ranging from 1.8-2.2 mg/dL over the years. She has history of CAD s/p multiple stents by Dr Mir (7 stents in all) since 2004 and a pacemaker in 2007. She follows with cardiologist Dr Santoyo .  Since her last visit she has been well , without major illness, no hospitalizations . Patient states that she has not been checking her blood pressure at home. She gained ~17 lbs since 08/2018. She admits worsening of lower extremity edema. She denies dyspnea, can be fairly active though admits to being lazy. She denies GI or  issues. Her appetite is good and her  'takes care of her'- he does most of the cooking. Patient states that she follows low sodium diet.  Patient denies: fever, chills, weight loss, dizziness, adenopathy, sore throat, rhinorrhea, cough, shortness of breath , chest pain, palpitations, orthopnea, nausea, vomiting, abdominal pain, changes in bowel habits, dysuria, urinary frequency, urgency, hematuria, rash.      Home BP: not checked    Baseline Cr: 1.8-2.2    ROS:  A comprehensive review of systems was obtained and negative, except as noted in the HPI or PMH.    Active Medical Problems:  Patient Active Problem List   Diagnosis     Degeneration of cervical intervertebral disc     Nonallopathic lesion of cervical region     Nonallopathic lesion of thoracic region     CAD s/p PCI+BMS to MRCA 10/2002, PCI to mLCX 2/2003, PCI+DESx2 to pLAD 11/2007, PCI+DESx1 to dRCA 12/2007, PCI+ALVAREZ to RPDA 7/2013; on indefinite DAPT w/o angina     Dizziness and giddiness     Edema     Esophageal reflux     Gout     Essential hypertension     Obstructive sleep apnea     Osteomalacia     Post-menopausal osteoporosis     Cardiac Pacemaker- Medtronic, dual chamber- NOT dependant     Transient complete heart block (H)     Sinus node dysfunction (H)     Disturbance of skin sensation     NSTEMI (non-ST elevated myocardial infarction) (H)      Arrhythmia       Personal Hx:   Social History     Socioeconomic History     Marital status:      Spouse name: Not on file     Number of children: 4     Years of education: Not on file     Highest education level: Not on file   Occupational History     Employer: [employer]   Social Needs     Financial resource strain: Not on file     Food insecurity:     Worry: Not on file     Inability: Not on file     Transportation needs:     Medical: Not on file     Non-medical: Not on file   Tobacco Use     Smoking status: Former Smoker     Packs/day: 0.30     Years: 15.00     Pack years: 4.50     Types: Cigarettes     Smokeless tobacco: Never Used     Tobacco comment: Quit 22+ years ago   Substance and Sexual Activity     Alcohol use: Not on file     Drug use: No     Sexual activity: Not Currently     Partners: Male     Birth control/protection: Post-menopausal   Lifestyle     Physical activity:     Days per week: Not on file     Minutes per session: Not on file     Stress: Not on file   Relationships     Social connections:     Talks on phone: Not on file     Gets together: Not on file     Attends Jew service: Not on file     Active member of club or organization: Not on file     Attends meetings of clubs or organizations: Not on file     Relationship status: Not on file     Intimate partner violence:     Fear of current or ex partner: Not on file     Emotionally abused: Not on file     Physically abused: Not on file     Forced sexual activity: Not on file   Other Topics Concern      Service Not Asked     Blood Transfusions Yes     Caffeine Concern Not Asked     Occupational Exposure Not Asked     Hobby Hazards Not Asked     Sleep Concern Not Asked     Stress Concern Not Asked     Weight Concern Not Asked     Special Diet Not Asked     Back Care Not Asked     Exercise No     Bike Helmet Not Asked     Seat Belt Not Asked     Self-Exams Not Asked     Parent/sibling w/ CABG, MI or angioplasty  "before 65F 55M? Not Asked   Social History Narrative     Not on file       Allergies:  Allergies   Allergen Reactions     Bactrim [Sulfamethoxazole W/Trimethoprim] Other (See Comments)     Patient unable to recall     Biaxin [Clarithromycin]      Chlorthalidone Nausea and Vomiting     Clonidine      Codeine Sulfate Itching     Darvon [Propoxyphene Hcl]      Dilaudid [Hydromorphone] Visual Disturbance     Hallucinations       Gabapentin Other (See Comments) and Fatigue     \"felt drunk\"     Indocin      Levaquin [Levofloxacin Hemihydrate]      Lyrica Fatigue     Morphine Sulfate      Percocet [Oxycodone-Acetaminophen]      Spironolactone Other (See Comments)     Dehydrated       Terazosin      Ciprofloxacin Rash     Simvastatin Palpitations     Muscle weakness, leg cramping         Current Outpatient Medications   Medication     allopurinol (ZYLOPRIM) 100 MG tablet     amLODIPine (NORVASC) 2.5 MG tablet     aspirin 81 MG tablet     clopidogrel (PLAVIX) 75 MG tablet     cyanocolbalamin (VITAMIN B-12) 1000 MCG tablet     furosemide (LASIX) 40 MG tablet     isosorbide mononitrate (ISMO/MONOKET) 20 MG tablet     metoprolol tartrate (LOPRESSOR) 100 MG tablet     multivitamin  with lutein (OCUVITE WITH LTEIN) CAPS     NITROSTAT 0.4 MG sublingual tablet     omeprazole (PRILOSEC) 40 MG DR capsule     order for DME     potassium chloride ER (K-TAB/KLOR-CON) 10 MEQ CR tablet     pravastatin (PRAVACHOL) 80 MG tablet     timolol (TIMOPTIC) 0.5 % ophthalmic solution     No current facility-administered medications for this visit.        Vitals:  /82   Pulse 78   Temp 97.4  F (36.3  C) (Oral)   Ht 1.702 m (5' 7\")   Wt 75.9 kg (167 lb 6.4 oz)   SpO2 98%   BMI 26.22 kg/m      Exam:  GENERAL APPEARANCE: alert and no distress  HENT: mouth without ulcers or lesions  LYMPHATICS: no cervical or supraclavicular nodes  RESP: lungs clear to auscultation - no rales, rhonchi or wheezes  CV: regular rhythm, normal rate, no rub, no " murmur  EDEMA: 2+ pitting LE edema bilaterally  ABDOMEN: soft, nondistended, nontender, bowel sounds normal  MS: extremities normal - no gross deformities noted, no evidence of inflammation in joints, no muscle tenderness  SKIN: no rash    Results:  Last Basic Metabolic Panel:  Lab Results   Component Value Date     11/08/2017      Lab Results   Component Value Date    POTASSIUM 3.5 11/08/2017     Lab Results   Component Value Date    CHLORIDE 104 11/08/2017     Lab Results   Component Value Date    ALEXANDRO 8.8 11/08/2017     Lab Results   Component Value Date    CO2 26 11/08/2017     Lab Results   Component Value Date    BUN 54 11/08/2017     Lab Results   Component Value Date    CR 2.36 11/08/2017     Lab Results   Component Value Date    GLC 88 11/08/2017         I was present with the fellow during the history and exam.  I discussed the case with the fellow and agree with the findings as documented in the assessment and plan.  Marzena Martin

## 2019-04-17 NOTE — LETTER
4/17/2019      RE: Tessa Mansfield  [address]  [facility] [address]          Assessment and Plan:   84 year old female with history of long standing HTN, CAD s/p multiple stents, who presents for followup of CKD stage 3, baseline SCr 1.8-2.2 mg/dL without proteinuria.    1. Stage III CKD - baseline SCr 1.8-2.2mg/ dL, which is stable today at 1.8mg/dL. Mild proteinuria is stable, UPCR is 0.44 g/gCr.  - RTC 3 months    2. Electrolytes/Acid Base status-no pressing issues    3. Hypertension/Volume status-elevated, not controlled BP is 170/82 mmHG. She is on metoprolol 100 mg BID, furosemide 40 mg nce a day, Amlodipine 2.5 mg daily and isosorbide 40 BID. Patient appears volume expanded on exam given lower extremity edema. She gained 17 lbs since 08/18. Last 2D ECHO in 07/2013 showed normal LVF 65 %, no significant valvular abnormalities, normal RV function.  -patient reports history of reaction/iontolerance to Spironolactone, Chlorthalidone, Clonidine and Terazosin in the past   -we will increase Lasix dose to 40 mg BID. We will repeat BMP in 2 weeks  -we will repeat 2D ECHO  -we will con't current dose Metoprolol, Amlodipine and Isosorbide  -patient was advised to monitor her BP daily and call nephrology clinic if her BP is persistently above 140/90 mmHG      4. Anemia- Improved. Hgb is 12.4 g/dl. Iron studies were not recently checked    5. BMD  -normal serum calcium and phosphorus  -secondary hyperparathyroidism-PTH is 184. Patient had PTH of 144 in 11/17. We will follow  - vitamin D level is pending      Assessment and plan was discussed with patient and she voiced her understanding and agreement.    Patient was staffed with DR.Boumitri Ashley Andersen MD  Nephrology Fellow  [facility]  Department of Medicine  Division of Renal Disease and Hypertension  [phone]      Reason for Visit:  Tessa Mansfield is a 84 year old female with HTN, who presents for CKD management.     HPI:  She  is a pleasant female with PMMHx significant for stage III CKD presumed secondary to hypertensive nephrosclerosis.Her renal function has been  relatively stable, ranging from 1.8-2.2 mg/dL over the years. She has history of CAD s/p multiple stents by Dr Mir (7 stents in all) since 2004 and a pacemaker in 2007. She follows with cardiologist Dr Santoyo .  Since her last visit she has been well , without major illness, no hospitalizations . Patient states that she has not been checking her blood pressure at home. She gained ~17 lbs since 08/2018. She admits worsening of lower extremity edema. She denies dyspnea, can be fairly active though admits to being lazy. She denies GI or  issues. Her appetite is good and her  'takes care of her'- he does most of the cooking. Patient states that she follows low sodium diet.  Patient denies: fever, chills, weight loss, dizziness, adenopathy, sore throat, rhinorrhea, cough, shortness of breath , chest pain, palpitations, orthopnea, nausea, vomiting, abdominal pain, changes in bowel habits, dysuria, urinary frequency, urgency, hematuria, rash.      Home BP: not checked    Baseline Cr: 1.8-2.2    ROS:  A comprehensive review of systems was obtained and negative, except as noted in the HPI or PMH.    Active Medical Problems:  Patient Active Problem List   Diagnosis     Degeneration of cervical intervertebral disc     Nonallopathic lesion of cervical region     Nonallopathic lesion of thoracic region     CAD s/p PCI+BMS to MRCA 10/2002, PCI to mLCX 2/2003, PCI+DESx2 to pLAD 11/2007, PCI+DESx1 to dRCA 12/2007, PCI+ALVAREZ to RPDA 7/2013; on indefinite DAPT w/o angina     Dizziness and giddiness     Edema     Esophageal reflux     Gout     Essential hypertension     Obstructive sleep apnea     Osteomalacia     Post-menopausal osteoporosis     Cardiac Pacemaker- Medtronic, dual chamber- NOT dependant     Transient complete heart block (H)     Sinus node dysfunction (H)      Disturbance of skin sensation     NSTEMI (non-ST elevated myocardial infarction) (H)     Arrhythmia       Personal Hx:   Social History     Socioeconomic History     Marital status:      Spouse name: Not on file     Number of children: 4     Years of education: Not on file     Highest education level: Not on file   Occupational History     Employer: [employer]   Social Needs     Financial resource strain: Not on file     Food insecurity:     Worry: Not on file     Inability: Not on file     Transportation needs:     Medical: Not on file     Non-medical: Not on file   Tobacco Use     Smoking status: Former Smoker     Packs/day: 0.30     Years: 15.00     Pack years: 4.50     Types: Cigarettes     Smokeless tobacco: Never Used     Tobacco comment: Quit 22+ years ago   Substance and Sexual Activity     Alcohol use: Not on file     Drug use: No     Sexual activity: Not Currently     Partners: Male     Birth control/protection: Post-menopausal   Lifestyle     Physical activity:     Days per week: Not on file     Minutes per session: Not on file     Stress: Not on file   Relationships     Social connections:     Talks on phone: Not on file     Gets together: Not on file     Attends Pentec[employer]al service: Not on file     Active member of club or organization: Not on file     Attends meetings of clubs or organizations: Not on file     Relationship status: Not on file     Intimate partner violence:     Fear of current or ex partner: Not on file     Emotionally abused: Not on file     Physically abused: Not on file     Forced sexual activity: Not on file   Other Topics Concern      Service Not Asked     Blood Transfusions Yes     Caffeine Concern Not Asked     Occupational Exposure Not Asked     Hobby Hazards Not Asked     Sleep Concern Not Asked     Stress Concern Not Asked     Weight Concern Not Asked     Special Diet Not Asked     Back Care Not Asked     Exercise No     Bike Helmet Not Asked     Seat  "Belt Not Asked     Self-Exams Not Asked     Parent/sibling w/ CABG, MI or angioplasty before 65F 55M? Not Asked   Social History Narrative     Not on file       Allergies:  Allergies   Allergen Reactions     Bactrim [Sulfamethoxazole W/Trimethoprim] Other (See Comments)     Patient unable to recall     Biaxin [Clarithromycin]      Chlorthalidone Nausea and Vomiting     Clonidine      Codeine Sulfate Itching     Darvon [Propoxyphene Hcl]      Dilaudid [Hydromorphone] Visual Disturbance     Hallucinations       Gabapentin Other (See Comments) and Fatigue     \"felt drunk\"     Indocin      Levaquin [Levofloxacin Hemihydrate]      Lyrica Fatigue     Morphine Sulfate      Percocet [Oxycodone-Acetaminophen]      Spironolactone Other (See Comments)     Dehydrated       Terazosin      Ciprofloxacin Rash     Simvastatin Palpitations     Muscle weakness, leg cramping         Current Outpatient Medications   Medication     allopurinol (ZYLOPRIM) 100 MG tablet     amLODIPine (NORVASC) 2.5 MG tablet     aspirin 81 MG tablet     clopidogrel (PLAVIX) 75 MG tablet     cyanocolbalamin (VITAMIN B-12) 1000 MCG tablet     furosemide (LASIX) 40 MG tablet     isosorbide mononitrate (ISMO/MONOKET) 20 MG tablet     metoprolol tartrate (LOPRESSOR) 100 MG tablet     multivitamin  with lutein (OCUVITE WITH LTEIN) CAPS     NITROSTAT 0.4 MG sublingual tablet     omeprazole (PRILOSEC) 40 MG DR capsule     order for DME     potassium chloride ER (K-TAB/KLOR-CON) 10 MEQ CR tablet     pravastatin (PRAVACHOL) 80 MG tablet     timolol (TIMOPTIC) 0.5 % ophthalmic solution     No current facility-administered medications for this visit.        Vitals:  /82   Pulse 78   Temp 97.4  F (36.3  C) (Oral)   Ht 1.702 m (5' 7\")   Wt 75.9 kg (167 lb 6.4 oz)   SpO2 98%   BMI 26.22 kg/m       Exam:  GENERAL APPEARANCE: alert and no distress  HENT: mouth without ulcers or lesions  LYMPHATICS: no cervical or supraclavicular nodes  RESP: lungs clear to " auscultation - no rales, rhonchi or wheezes  CV: regular rhythm, normal rate, no rub, no murmur  EDEMA: 2+ pitting LE edema bilaterally  ABDOMEN: soft, nondistended, nontender, bowel sounds normal  MS: extremities normal - no gross deformities noted, no evidence of inflammation in joints, no muscle tenderness  SKIN: no rash    Results:  Last Basic Metabolic Panel:  Lab Results   Component Value Date     11/08/2017      Lab Results   Component Value Date    POTASSIUM 3.5 11/08/2017     Lab Results   Component Value Date    CHLORIDE 104 11/08/2017     Lab Results   Component Value Date    ALEXANDRO 8.8 11/08/2017     Lab Results   Component Value Date    CO2 26 11/08/2017     Lab Results   Component Value Date    BUN 54 11/08/2017     Lab Results   Component Value Date    CR 2.36 11/08/2017     Lab Results   Component Value Date    GLC 88 11/08/2017         I was present with the fellow during the history and exam.  I discussed the case with the fellow and agree with the findings as documented in the assessment and plan.    Marzena Martin MD

## 2019-04-24 ENCOUNTER — CARE COORDINATION (OUTPATIENT)
Dept: NEPHROLOGY | Facility: CLINIC | Age: 84
End: 2019-04-24

## 2019-04-24 DIAGNOSIS — N18.4 CHRONIC KIDNEY DISEASE, STAGE IV (SEVERE) (H): ICD-10-CM

## 2019-04-24 DIAGNOSIS — I10 ESSENTIAL HYPERTENSION: ICD-10-CM

## 2019-04-24 RX ORDER — FUROSEMIDE 40 MG
40 TABLET ORAL DAILY
Qty: 30 TABLET | Refills: 11 | Status: SHIPPED | OUTPATIENT
Start: 2019-04-24 | End: 2020-01-06

## 2019-04-24 NOTE — PROGRESS NOTES
Nephrology Note: Nursing Outreach Encounter    REASON FOR CALL:                                                      REASON FOR CALL: Blood Pressure Follow Up                                          SITUATION/BACKROUND:                                                    Patient is being treated for HTN.      ASSESSMENT:                                                      Called patient to check in. States her BP was around 150/80's for the first two days after the lasix increase, but it's now down to around 129/79. Pulse in the 60-80 range. Her edema is now gone, so she wants to know if she should stop BID lasix.    Currently prescribed lasix 40mg BID, imdur 40mg TID, metoprolol 100mg BID, and amlodipine 2.5mg daily.    Uremic Symptoms: No       PLAN:                                                      Follow Up:   Route to provider to further advise     Per Dr. Martin: Let's go back to once a day and monitor. If BP goes back up or swelling returns, ok to resume BID     Patient verbalized understanding and will contact the clinic with any further questions or concerns.     Christy Roblero RN

## 2019-05-02 DIAGNOSIS — N18.4 CKD (CHRONIC KIDNEY DISEASE) STAGE 4, GFR 15-29 ML/MIN (H): ICD-10-CM

## 2019-05-02 DIAGNOSIS — I10 ESSENTIAL HYPERTENSION: ICD-10-CM

## 2019-05-02 DIAGNOSIS — N18.4 CHRONIC KIDNEY DISEASE, STAGE IV (SEVERE) (H): ICD-10-CM

## 2019-05-02 PROCEDURE — 36415 COLL VENOUS BLD VENIPUNCTURE: CPT | Performed by: FAMILY MEDICINE

## 2019-05-02 PROCEDURE — 80069 RENAL FUNCTION PANEL: CPT | Performed by: FAMILY MEDICINE

## 2019-05-02 PROCEDURE — 83735 ASSAY OF MAGNESIUM: CPT | Performed by: FAMILY MEDICINE

## 2019-05-03 LAB
ALBUMIN SERPL-MCNC: 3.7 G/DL (ref 3.4–5)
ANION GAP SERPL CALCULATED.3IONS-SCNC: 6 MMOL/L (ref 3–14)
BUN SERPL-MCNC: 58 MG/DL (ref 7–30)
CALCIUM SERPL-MCNC: 8.6 MG/DL (ref 8.5–10.1)
CHLORIDE SERPL-SCNC: 112 MMOL/L (ref 94–109)
CO2 SERPL-SCNC: 26 MMOL/L (ref 20–32)
CREAT SERPL-MCNC: 2.2 MG/DL (ref 0.52–1.04)
GFR SERPL CREATININE-BSD FRML MDRD: 20 ML/MIN/{1.73_M2}
GLUCOSE SERPL-MCNC: 96 MG/DL (ref 70–99)
MAGNESIUM SERPL-MCNC: 2.4 MG/DL (ref 1.6–2.3)
PHOSPHATE SERPL-MCNC: 3.5 MG/DL (ref 2.5–4.5)
POTASSIUM SERPL-SCNC: 4.4 MMOL/L (ref 3.4–5.3)
SODIUM SERPL-SCNC: 144 MMOL/L (ref 133–144)

## 2019-05-15 ENCOUNTER — CARE COORDINATION (OUTPATIENT)
Dept: NEPHROLOGY | Facility: CLINIC | Age: 84
End: 2019-05-15

## 2019-05-15 NOTE — PROGRESS NOTES
Nephrology Note: Nursing Outreach Encounter    REASON FOR CALL:                                                      REASON FOR CALL: Medication Change                                          SITUATION/BACKROUND:                                                    Patient is being treated for HTN.      ASSESSMENT:                                                      Called patient to check in following lasix dose decrease. She feels great, said swelling is still gone. BP has been averaging 103//80's. She'll continue to monitor this leading up to her appointment in July. Denied questions or concerns.    Uremic Symptoms: No       PLAN:                                                      Follow Up:   Patient to follow up as scheduled at next apt     Patient verbalized understanding and will contact the clinic with any further questions or concerns.     Christy Roblero RN

## 2019-05-20 ENCOUNTER — ANCILLARY PROCEDURE (OUTPATIENT)
Dept: CARDIOLOGY | Facility: CLINIC | Age: 84
End: 2019-05-20
Attending: INTERNAL MEDICINE
Payer: COMMERCIAL

## 2019-05-20 DIAGNOSIS — I49.5 SICK SINUS SYNDROME (H): ICD-10-CM

## 2019-05-20 PROCEDURE — 93296 REM INTERROG EVL PM/IDS: CPT | Mod: ZF

## 2019-05-20 PROCEDURE — 93294 REM INTERROG EVL PM/LDLS PM: CPT | Performed by: INTERNAL MEDICINE

## 2019-06-03 LAB
MDC_IDC_LEAD_IMPLANT_DT: NORMAL
MDC_IDC_LEAD_IMPLANT_DT: NORMAL
MDC_IDC_LEAD_LOCATION: NORMAL
MDC_IDC_LEAD_LOCATION: NORMAL
MDC_IDC_LEAD_LOCATION_DETAIL_1: NORMAL
MDC_IDC_LEAD_LOCATION_DETAIL_1: NORMAL
MDC_IDC_LEAD_MFG: NORMAL
MDC_IDC_LEAD_MFG: NORMAL
MDC_IDC_LEAD_MODEL: NORMAL
MDC_IDC_LEAD_MODEL: NORMAL
MDC_IDC_LEAD_POLARITY_TYPE: NORMAL
MDC_IDC_LEAD_POLARITY_TYPE: NORMAL
MDC_IDC_LEAD_SERIAL: NORMAL
MDC_IDC_LEAD_SERIAL: NORMAL
MDC_IDC_MSMT_BATTERY_DTM: NORMAL
MDC_IDC_MSMT_BATTERY_IMPEDANCE: 3554 OHM
MDC_IDC_MSMT_BATTERY_REMAINING_LONGEVITY: 15 MO
MDC_IDC_MSMT_BATTERY_STATUS: NORMAL
MDC_IDC_MSMT_BATTERY_VOLTAGE: 2.67 V
MDC_IDC_MSMT_LEADCHNL_RA_IMPEDANCE_VALUE: 452 OHM
MDC_IDC_MSMT_LEADCHNL_RA_PACING_THRESHOLD_AMPLITUDE: 0.5 V
MDC_IDC_MSMT_LEADCHNL_RA_PACING_THRESHOLD_PULSEWIDTH: 0.4 MS
MDC_IDC_MSMT_LEADCHNL_RV_IMPEDANCE_VALUE: 578 OHM
MDC_IDC_MSMT_LEADCHNL_RV_PACING_THRESHOLD_AMPLITUDE: 0.88 V
MDC_IDC_MSMT_LEADCHNL_RV_PACING_THRESHOLD_PULSEWIDTH: 0.4 MS
MDC_IDC_PG_IMPLANT_DTM: NORMAL
MDC_IDC_PG_MFG: NORMAL
MDC_IDC_PG_MODEL: NORMAL
MDC_IDC_PG_SERIAL: NORMAL
MDC_IDC_PG_TYPE: NORMAL
MDC_IDC_SESS_CLINIC_NAME: NORMAL
MDC_IDC_SESS_DTM: NORMAL
MDC_IDC_SESS_TYPE: NORMAL
MDC_IDC_SET_BRADY_AT_MODE_SWITCH_MODE: NORMAL
MDC_IDC_SET_BRADY_AT_MODE_SWITCH_RATE: 175 {BEATS}/MIN
MDC_IDC_SET_BRADY_LOWRATE: 60 {BEATS}/MIN
MDC_IDC_SET_BRADY_MAX_SENSOR_RATE: 130 {BEATS}/MIN
MDC_IDC_SET_BRADY_MAX_TRACKING_RATE: 130 {BEATS}/MIN
MDC_IDC_SET_BRADY_MODE: NORMAL
MDC_IDC_SET_BRADY_PAV_DELAY_LOW: 150 MS
MDC_IDC_SET_BRADY_SAV_DELAY_LOW: 120 MS
MDC_IDC_SET_LEADCHNL_RA_PACING_AMPLITUDE: 1.5 V
MDC_IDC_SET_LEADCHNL_RA_PACING_ANODE_ELECTRODE_1: NORMAL
MDC_IDC_SET_LEADCHNL_RA_PACING_ANODE_LOCATION_1: NORMAL
MDC_IDC_SET_LEADCHNL_RA_PACING_CAPTURE_MODE: NORMAL
MDC_IDC_SET_LEADCHNL_RA_PACING_CATHODE_ELECTRODE_1: NORMAL
MDC_IDC_SET_LEADCHNL_RA_PACING_CATHODE_LOCATION_1: NORMAL
MDC_IDC_SET_LEADCHNL_RA_PACING_POLARITY: NORMAL
MDC_IDC_SET_LEADCHNL_RA_PACING_PULSEWIDTH: 0.4 MS
MDC_IDC_SET_LEADCHNL_RA_SENSING_ANODE_ELECTRODE_1: NORMAL
MDC_IDC_SET_LEADCHNL_RA_SENSING_ANODE_LOCATION_1: NORMAL
MDC_IDC_SET_LEADCHNL_RA_SENSING_CATHODE_ELECTRODE_1: NORMAL
MDC_IDC_SET_LEADCHNL_RA_SENSING_CATHODE_LOCATION_1: NORMAL
MDC_IDC_SET_LEADCHNL_RA_SENSING_POLARITY: NORMAL
MDC_IDC_SET_LEADCHNL_RA_SENSING_SENSITIVITY: 0.5 MV
MDC_IDC_SET_LEADCHNL_RV_PACING_AMPLITUDE: 2 V
MDC_IDC_SET_LEADCHNL_RV_PACING_ANODE_ELECTRODE_1: NORMAL
MDC_IDC_SET_LEADCHNL_RV_PACING_ANODE_LOCATION_1: NORMAL
MDC_IDC_SET_LEADCHNL_RV_PACING_CAPTURE_MODE: NORMAL
MDC_IDC_SET_LEADCHNL_RV_PACING_CATHODE_ELECTRODE_1: NORMAL
MDC_IDC_SET_LEADCHNL_RV_PACING_CATHODE_LOCATION_1: NORMAL
MDC_IDC_SET_LEADCHNL_RV_PACING_POLARITY: NORMAL
MDC_IDC_SET_LEADCHNL_RV_PACING_PULSEWIDTH: 0.4 MS
MDC_IDC_SET_LEADCHNL_RV_SENSING_ANODE_ELECTRODE_1: NORMAL
MDC_IDC_SET_LEADCHNL_RV_SENSING_ANODE_LOCATION_1: NORMAL
MDC_IDC_SET_LEADCHNL_RV_SENSING_CATHODE_ELECTRODE_1: NORMAL
MDC_IDC_SET_LEADCHNL_RV_SENSING_CATHODE_LOCATION_1: NORMAL
MDC_IDC_SET_LEADCHNL_RV_SENSING_POLARITY: NORMAL
MDC_IDC_SET_LEADCHNL_RV_SENSING_SENSITIVITY: 4 MV
MDC_IDC_SET_ZONE_DETECTION_INTERVAL: 333.33 MS
MDC_IDC_SET_ZONE_DETECTION_INTERVAL: 342.86 MS
MDC_IDC_SET_ZONE_TYPE: NORMAL
MDC_IDC_SET_ZONE_TYPE: NORMAL
MDC_IDC_STAT_AT_BURDEN_PERCENT: 0 %
MDC_IDC_STAT_AT_DTM_END: NORMAL
MDC_IDC_STAT_AT_DTM_START: NORMAL
MDC_IDC_STAT_AT_MODE_SW_COUNT: 1
MDC_IDC_STAT_BRADY_AP_VP_PERCENT: 0 %
MDC_IDC_STAT_BRADY_AP_VS_PERCENT: 90 %
MDC_IDC_STAT_BRADY_AS_VP_PERCENT: 0 %
MDC_IDC_STAT_BRADY_AS_VS_PERCENT: 10 %
MDC_IDC_STAT_BRADY_DTM_END: NORMAL
MDC_IDC_STAT_BRADY_DTM_START: NORMAL
MDC_IDC_STAT_EPISODE_RECENT_COUNT: 0
MDC_IDC_STAT_EPISODE_RECENT_COUNT: 0
MDC_IDC_STAT_EPISODE_RECENT_COUNT_DTM_END: NORMAL
MDC_IDC_STAT_EPISODE_RECENT_COUNT_DTM_END: NORMAL
MDC_IDC_STAT_EPISODE_RECENT_COUNT_DTM_START: NORMAL
MDC_IDC_STAT_EPISODE_RECENT_COUNT_DTM_START: NORMAL
MDC_IDC_STAT_EPISODE_TYPE: NORMAL
MDC_IDC_STAT_EPISODE_TYPE: NORMAL

## 2019-06-24 ENCOUNTER — OFFICE VISIT (OUTPATIENT)
Dept: FAMILY MEDICINE | Facility: CLINIC | Age: 84
End: 2019-06-24
Payer: COMMERCIAL

## 2019-06-24 VITALS
OXYGEN SATURATION: 99 % | TEMPERATURE: 98 F | WEIGHT: 157.3 LBS | HEART RATE: 65 BPM | BODY MASS INDEX: 24.64 KG/M2 | SYSTOLIC BLOOD PRESSURE: 148 MMHG | DIASTOLIC BLOOD PRESSURE: 74 MMHG

## 2019-06-24 DIAGNOSIS — R22.1 MASS OF LEFT SIDE OF NECK: Primary | ICD-10-CM

## 2019-06-24 ASSESSMENT — PAIN SCALES - GENERAL: PAINLEVEL: NO PAIN (0)

## 2019-06-24 NOTE — NURSING NOTE
"Chief Complaint   Patient presents with     Recheck Medication     Pt is here for a f/u visit and c/o bump on L jaw       Vital signs:  Temp: 98  F (36.7  C) Temp src: Oral BP: 148/74(2nd attempt) Pulse: 65     SpO2: 99 %       Weight: 71.4 kg (157 lb 4.8 oz)  Estimated body mass index is 24.64 kg/m  as calculated from the following:    Height as of 4/17/19: 1.702 m (5' 7\").    Weight as of this encounter: 71.4 kg (157 lb 4.8 oz).      SMA Alfonzo at 11:32 AM on 6/24/2019  "

## 2019-06-24 NOTE — PROGRESS NOTES
[facility]  Primary Care Center   Pedro Gomez MD  06/24/2019      Chief Complaint:   Recheck Medication       History of Present Illness:   Tessa Mansfield is a 84 year old female with a history of stage 4 CKD, hyperlipidemia, hypertension, KEVYN, CAD, and NSTEMI who presents for follow up and evaluation of a bump on her jaw. We last saw the patient on 12/19/2018. She says today that overall she is feeling very good. Her gout has been under control. She reports good cholesterol and blood pressure. Blood pressure in the 110s for systolic.     Lump on jaw: The patient complained of a lump on her jaw. She says she woke up one night and it was there. It does not hurt it just bothers her cosmetically. She says it has not gotten any bigger since it first appeared but has not shrunk at all.     Hip pain: Last time we saw the patient we referred her to orthopedics for hip pain. She was given an injection in the hip and has had no pain since. She is very pleased with this and feels good. She currently walks with a cane and says she is going to keep doing this.     Nephrology: She saw nephrology on 4/17 and they did not change much to her medication regimen except changing Lasix. They had her take 40 mg of Lasix twice a day for a few weeks to help with swelling and then once it was down she went back to and is currently at 40 mg a day. Her next nephrology appointment is on 7/10/2019.     Other concerns discussed:  1. Sees Dr. Santoyo on 10/15/2019 in cardiology  2. Discussed bilateral eye watering, wondering if this is due to allergies        Review of Systems:   Pertinent items are noted in HPI or as in patient entered ROS below, remainder of complete ROS is negative.     Active Medications:     Current Outpatient Medications:      allopurinol (ZYLOPRIM) 100 MG tablet, Take 1 tablet (100 mg) by mouth daily, Disp: 90 tablet, Rfl: 3     amLODIPine (NORVASC) 2.5 MG tablet, Take 1 tablet (2.5 mg) by mouth daily, Disp: 30  tablet, Rfl: 11     aspirin 81 MG tablet, Take 1 tablet by mouth daily., Disp: , Rfl:      clopidogrel (PLAVIX) 75 MG tablet, Take 1 tablet (75 mg) by mouth daily, Disp: 90 tablet, Rfl: 1     cyanocolbalamin (VITAMIN B-12) 1000 MCG tablet, Take 500 mcg by mouth daily As directed, Disp: , Rfl:      furosemide (LASIX) 40 MG tablet, Take 1 tablet (40 mg) by mouth daily, Disp: 30 tablet, Rfl: 11     isosorbide mononitrate (ISMO/MONOKET) 20 MG tablet, Take 2 tablets (40 mg) by mouth 3 times daily Please make a follow up appointment for further refills., Disp: 540 tablet, Rfl: 1     metoprolol tartrate (LOPRESSOR) 100 MG tablet, Take 1 tablet (100 mg) by mouth 2 times daily, Disp: 180 tablet, Rfl: 3     multivitamin  with lutein (OCUVITE WITH LTEIN) CAPS, Take 1 capsule by mouth daily, Disp: , Rfl:      NITROSTAT 0.4 MG sublingual tablet, DISSOLVE ONE TABLET UNDER THE TONGUE EVERY 5 MINUTES AS NEEDED FOR CHEST PAIN.  DO NOT EXCEED A TOTAL OF 3 DOSES IN 15 MINUTES. CALL 911., Disp: 25 tablet, Rfl: 3     omeprazole (PRILOSEC) 40 MG DR capsule, TAKE ONE CAPSULE BY MOUTH ONCE DAILY, Disp: 90 capsule, Rfl: 3     order for DME, Equipment being ordered: Left hand and wrist brace, Disp: 1 each, Rfl: 0     potassium chloride ER (K-TAB/KLOR-CON) 10 MEQ CR tablet, TAKE TWO TABLETS BY MOUTH DAILY IN THE MORNING AND ONE TABLET IN THE EVENING, Disp: 270 tablet, Rfl: 3     pravastatin (PRAVACHOL) 80 MG tablet, TAKE 1 TABLET BY MOUTH ONCE DAILY AT BEDTIME, Disp: 90 tablet, Rfl: 2     timolol (TIMOPTIC) 0.5 % ophthalmic solution, Place 1 drop into both eyes daily , Disp: , Rfl:       Allergies:   Bactrim [sulfamethoxazole w/trimethoprim]; Biaxin [clarithromycin]; Chlorthalidone; Clonidine; Codeine sulfate; Darvon [propoxyphene hcl]; Dilaudid [hydromorphone]; Gabapentin; Indocin; Levaquin [levofloxacin hemihydrate]; Lyrica; Morphine sulfate; Percocet [oxycodone-acetaminophen]; Spironolactone; Terazosin; Ciprofloxacin; and Simvastatin       Past Medical History:  Arrhythmia  Cardiac pacemaker, Medtronic, dual chamber, non dependant  Chronic kidney disease, stage IV  Coronary artery disease  Degeneration of cervical intervertebral disc  Edema  Esophageal reflux  Gout  Hyperlipidemia  Hypertension  Nonallopathic lesion of cervical region  Nonallopathic lesion of thoracic region  NSTEMI (non-ST elevated myocardial infarction)  Obstructive sleep apnea  Osteomalacia  Post-menopausal osteoporosis  Sinus node dysfunction   Stented coronary artery, RCA, LCx, LAD  Transient complete heart block     Past Surgical History:  Cholecystectomy  PPM insertion new/replacement w/ atrial and ventricular lead (11/2007)  Hysterectomy    Family History:   Bladder cancer - sister     Social History:   Tobacco Use: former smoker, 0.3 packs/day, 15 years, quit 22+ years ago  Alcohol Use: none  Drug Use: none  PCP: Pedro Gomez      Physical Exam:   /74   Pulse 65   Temp 98  F (36.7  C) (Oral)   Wt 71.4 kg (157 lb 4.8 oz)   SpO2 99%   Breastfeeding? No   BMI 24.64 kg/m     Constitutional: Alert. In no distress.  Head: The scalp, face, and head appear normal.  Musculoskeletal: Extremities appear normal. No gross deformities noted. Left submandibular 1-2 cm nodule which is not tender, but has been there since christmas. Could not feel any masses elsewhere in the neck.  Neurologic: Gait normal. Speech is normal and fluent.  Psychiatric: Mentation appears normal. Normal affect.      Assessment and Plan:  Mass of left side of neck  - US Head Neck Soft Tissue     Heart Disease  No recent symptoms.    Kidney Disease  Sees renal clinic soon. At home BP are good.     Gout  This has been stable recently.       Follow-up: In six months (around 12/24/2019).        Scribe Disclosure:  I, Henny Thorne, am serving as a scribe to document services personally performed by Pedro Gomez MD at this visit, based upon the provider's statements to me. All documentation has  been reviewed by the aforementioned provider prior to being entered into the official medical record.       Portions of this medical record were completed by a scribe. UPON MY REVIEW AND AUTHENTICATION BY ELECTRONIC SIGNATURE, this confirms (a) I performed the applicable clinical services, and (b) the record is accurate.   Pedro Gomez MD

## 2019-06-24 NOTE — PATIENT INSTRUCTIONS
Primary Care Center Phone Number [phone]  Primary Care Center Medication Refill Request Information:  * Please contact your pharmacy regarding ANY request for medication refills.  ** [facility] Prescription Fax = [phone]  * Please allow 3 business days for routine medication refills.  * Please allow 5 business days for controlled substance medication refills.     Primary Care Center Test Result notification information:  *You will be notified with in 7-10 days of your appointment day regarding the results of your test.  If you are on MyChart you will be notified as soon as the provider has reviewed the results and signed off on them.

## 2019-07-09 DIAGNOSIS — N18.4 CKD (CHRONIC KIDNEY DISEASE) STAGE 4, GFR 15-29 ML/MIN (H): Primary | ICD-10-CM

## 2019-07-10 ENCOUNTER — OFFICE VISIT (OUTPATIENT)
Dept: NEPHROLOGY | Facility: CLINIC | Age: 84
End: 2019-07-10
Attending: INTERNAL MEDICINE
Payer: COMMERCIAL

## 2019-07-10 ENCOUNTER — ANCILLARY PROCEDURE (OUTPATIENT)
Dept: ULTRASOUND IMAGING | Facility: CLINIC | Age: 84
End: 2019-07-10
Attending: FAMILY MEDICINE
Payer: COMMERCIAL

## 2019-07-10 VITALS
WEIGHT: 156.8 LBS | OXYGEN SATURATION: 98 % | BODY MASS INDEX: 24.56 KG/M2 | DIASTOLIC BLOOD PRESSURE: 71 MMHG | SYSTOLIC BLOOD PRESSURE: 135 MMHG | TEMPERATURE: 97.4 F | HEART RATE: 82 BPM

## 2019-07-10 DIAGNOSIS — I10 ESSENTIAL HYPERTENSION: ICD-10-CM

## 2019-07-10 DIAGNOSIS — N18.4 CKD (CHRONIC KIDNEY DISEASE) STAGE 4, GFR 15-29 ML/MIN (H): ICD-10-CM

## 2019-07-10 DIAGNOSIS — I25.10 CORONARY ARTERY DISEASE INVOLVING NATIVE CORONARY ARTERY OF NATIVE HEART WITHOUT ANGINA PECTORIS: ICD-10-CM

## 2019-07-10 DIAGNOSIS — N18.4 CKD (CHRONIC KIDNEY DISEASE) STAGE 4, GFR 15-29 ML/MIN (H): Primary | ICD-10-CM

## 2019-07-10 DIAGNOSIS — R22.1 MASS OF LEFT SIDE OF NECK: ICD-10-CM

## 2019-07-10 LAB
ALBUMIN SERPL-MCNC: 3.7 G/DL (ref 3.4–5)
ANION GAP SERPL CALCULATED.3IONS-SCNC: 6 MMOL/L (ref 3–14)
BUN SERPL-MCNC: 58 MG/DL (ref 7–30)
CALCIUM SERPL-MCNC: 8.5 MG/DL (ref 8.5–10.1)
CHLORIDE SERPL-SCNC: 108 MMOL/L (ref 94–109)
CO2 SERPL-SCNC: 27 MMOL/L (ref 20–32)
CREAT SERPL-MCNC: 1.81 MG/DL (ref 0.52–1.04)
CREAT UR-MCNC: 25 MG/DL
DEPRECATED CALCIDIOL+CALCIFEROL SERPL-MC: 24 UG/L (ref 20–75)
GFR SERPL CREATININE-BSD FRML MDRD: 26 ML/MIN/{1.73_M2}
GLUCOSE SERPL-MCNC: 102 MG/DL (ref 70–99)
HGB BLD-MCNC: 11.8 G/DL (ref 11.7–15.7)
PHOSPHATE SERPL-MCNC: 2.9 MG/DL (ref 2.5–4.5)
POTASSIUM SERPL-SCNC: 3.6 MMOL/L (ref 3.4–5.3)
PROT UR-MCNC: <0.05 G/L
PROT/CREAT 24H UR: NORMAL G/G CR (ref 0–0.2)
PTH-INTACT SERPL-MCNC: 182 PG/ML (ref 18–80)
SODIUM SERPL-SCNC: 141 MMOL/L (ref 133–144)

## 2019-07-10 PROCEDURE — 82306 VITAMIN D 25 HYDROXY: CPT | Performed by: INTERNAL MEDICINE

## 2019-07-10 PROCEDURE — 80069 RENAL FUNCTION PANEL: CPT | Performed by: INTERNAL MEDICINE

## 2019-07-10 PROCEDURE — 84156 ASSAY OF PROTEIN URINE: CPT | Performed by: INTERNAL MEDICINE

## 2019-07-10 PROCEDURE — 85018 HEMOGLOBIN: CPT | Performed by: INTERNAL MEDICINE

## 2019-07-10 PROCEDURE — 36415 COLL VENOUS BLD VENIPUNCTURE: CPT | Performed by: INTERNAL MEDICINE

## 2019-07-10 PROCEDURE — G0463 HOSPITAL OUTPT CLINIC VISIT: HCPCS | Mod: ZF

## 2019-07-10 PROCEDURE — 83970 ASSAY OF PARATHORMONE: CPT | Performed by: INTERNAL MEDICINE

## 2019-07-10 ASSESSMENT — PAIN SCALES - GENERAL: PAINLEVEL: NO PAIN (0)

## 2019-07-10 NOTE — PROGRESS NOTES
Assessment and Plan:   84 year old female with history of long standing HTN, CAD s/p multiple stents, who presents for followup of CKD stage 3, baseline SCr 1.8-2.2 mg/dL without proteinuria.  1. CKD stage 3- baseline SCr 1.8-2.2mg/ dL, which is stable today at 1.8mg/dL. Mild/ minimal proteinuria     - she has lost weight and BP lower     - labs in 3 months and RTC 6 months  2. Electrolytes/Acid Base status- normal.    Hypokalemia- takes 3 potassium pills daily   - if she has more cramps, should let us know and eat high K diet.  3. Hypertension/Volume status- she has lost 10 lbs doing weight watchers, BP is 110-120 at home. She is on metoprolol 100 mg BID, furosemide 40 mg once a day, Amlodipine 2.5 mg daily and isosorbide 40 BID.   - at last visit, we had her increase furosemide to bid, which she did for a couple days but then returned to once a day as BP were good  - given lower BP and weight loss, will have her stop amlodipine, would restart if BP >140 -145 consistently  Last 2D ECHO in 07/2013 showed normal LVF 65 %, no significant valvular abnormalities, normal RV function.  -patient reports history of reaction/iontolerance to Spironolactone, Chlorthalidone, Clonidine and Terazosin in the past   -patient was advised to monitor her BP daily and call nephrology clinic if her BP is persistently above 140/90 mmHG    4. Anemia- Improved. Hgb is 12.4 g/dl. Iron studies were not recently checked    5. BMD  -normal serum calcium and phosphorus  -secondary hyperparathyroidism-PTH is 184. Patient had PTH of 144 in 11/17. We will follow  - vitamin D level is pending      Assessment and plan was discussed with patient and she voiced her understanding and agreement.        Reason for Visit:  Tessa Mansfield is a 84 year old female with HTN, who presents for CKD management.     HPI:  She is a pleasant female with PMMHx significant for stage III CKD presumed secondary to hypertensive nephrosclerosis.Her renal function has  been  relatively stable, ranging from 1.8-2.2 mg/dL over the years. She has history of CAD s/p multiple stents by Dr Mir (7 stents in all) since 2004 and a pacemaker in 2007. She follows with cardiologist Dr Santoyo .  Since her last visit she has been well , without major illness, no hospitalizations . Patient states that she has not been checking her blood pressure at home.  She denies dyspnea, can be fairly active though admits to being lazy. She denies GI or  issues. Her appetite is good and her  'takes care of her'- he does most of the cooking. Patient states that she follows low sodium diet.  She has joined weight watchers and has lost a significant amount of weight. She feels so much better, and hopes to lose a few more lbs.  She states her blood pressure is doing very well.  Her creatinine today is stable at 1.8      Home BP: not checked    Baseline Cr: 1.8-2.2    ROS:  A comprehensive review of systems was obtained and negative, except as noted in the HPI or PMH.    Active Medical Problems:  Patient Active Problem List   Diagnosis     Degeneration of cervical intervertebral disc     Nonallopathic lesion of cervical region     Nonallopathic lesion of thoracic region     CAD s/p PCI+BMS to MRCA 10/2002, PCI to mLCX 2/2003, PCI+DESx2 to pLAD 11/2007, PCI+DESx1 to dRCA 12/2007, PCI+ALVAREZ to RPDA 7/2013; on indefinite DAPT w/o angina     Dizziness and giddiness     Edema     Esophageal reflux     Gout     Essential hypertension     Obstructive sleep apnea     Osteomalacia     Post-menopausal osteoporosis     Cardiac Pacemaker- Medtronic, dual chamber- NOT dependant     Transient complete heart block (H)     Sinus node dysfunction (H)     Disturbance of skin sensation     NSTEMI (non-ST elevated myocardial infarction) (H)     Arrhythmia       Personal Hx:   Social History     Socioeconomic History     Marital status:      Spouse name: Not on file     Number of children: 4     Years of education:  Not on file     Highest education level: Not on file   Occupational History     Employer: [employer]   Social Needs     Financial resource strain: Not on file     Food insecurity:     Worry: Not on file     Inability: Not on file     Transportation needs:     Medical: Not on file     Non-medical: Not on file   Tobacco Use     Smoking status: Former Smoker     Packs/day: 0.30     Years: 15.00     Pack years: 4.50     Types: Cigarettes     Smokeless tobacco: Never Used     Tobacco comment: Quit 22+ years ago   Substance and Sexual Activity     Alcohol use: Not on file     Drug use: No     Sexual activity: Not Currently     Partners: Male     Birth control/protection: Post-menopausal   Lifestyle     Physical activity:     Days per week: Not on file     Minutes per session: Not on file     Stress: Not on file   Relationships     Social connections:     Talks on phone: Not on file     Gets together: Not on file     Attends Islam service: Not on file     Active member of club or organization: Not on file     Attends meetings of clubs or organizations: Not on file     Relationship status: Not on file     Intimate partner violence:     Fear of current or ex partner: Not on file     Emotionally abused: Not on file     Physically abused: Not on file     Forced sexual activity: Not on file   Other Topics Concern      Service Not Asked     Blood Transfusions Yes     Caffeine Concern Not Asked     Occupational Exposure Not Asked     Hobby Hazards Not Asked     Sleep Concern Not Asked     Stress Concern Not Asked     Weight Concern Not Asked     Special Diet Not Asked     Back Care Not Asked     Exercise No     Bike Helmet Not Asked     Seat Belt Not Asked     Self-Exams Not Asked     Parent/sibling w/ CABG, MI or angioplasty before 65F 55M? Not Asked   Social History Narrative     Not on file       Allergies:  Allergies   Allergen Reactions     Bactrim [Sulfamethoxazole W/Trimethoprim] Other (See  "Comments)     Patient unable to recall     Biaxin [Clarithromycin]      Chlorthalidone Nausea and Vomiting     Clonidine      Codeine Sulfate Itching     Darvon [Propoxyphene Hcl]      Dilaudid [Hydromorphone] Visual Disturbance     Hallucinations       Gabapentin Other (See Comments) and Fatigue     \"felt drunk\"     Indocin      Levaquin [Levofloxacin Hemihydrate]      Lyrica Fatigue     Morphine Sulfate      Percocet [Oxycodone-Acetaminophen]      Spironolactone Other (See Comments)     Dehydrated       Terazosin      Ciprofloxacin Rash     Simvastatin Palpitations     Muscle weakness, leg cramping         Current Outpatient Medications   Medication     allopurinol (ZYLOPRIM) 100 MG tablet     amLODIPine (NORVASC) 2.5 MG tablet     aspirin 81 MG tablet     clopidogrel (PLAVIX) 75 MG tablet     cyanocolbalamin (VITAMIN B-12) 1000 MCG tablet     furosemide (LASIX) 40 MG tablet     isosorbide mononitrate (ISMO/MONOKET) 20 MG tablet     metoprolol tartrate (LOPRESSOR) 100 MG tablet     multivitamin  with lutein (OCUVITE WITH LTEIN) CAPS     NITROSTAT 0.4 MG sublingual tablet     omeprazole (PRILOSEC) 40 MG DR capsule     order for DME     potassium chloride ER (K-TAB/KLOR-CON) 10 MEQ CR tablet     pravastatin (PRAVACHOL) 80 MG tablet     timolol (TIMOPTIC) 0.5 % ophthalmic solution     No current facility-administered medications for this visit.        Vitals:  Pulse 82   Temp 97.4  F (36.3  C) (Oral)   Wt 71.1 kg (156 lb 12.8 oz)   SpO2 98%   BMI 24.56 kg/m      Exam:  GENERAL APPEARANCE: alert and no distress  HENT: mouth without ulcers or lesions  LYMPHATICS: no cervical or supraclavicular nodes  RESP: lungs clear to auscultation - no rales, rhonchi or wheezes  CV: regular rhythm, normal rate, no rub, no murmur  EDEMA: 1+ pitting LE edema bilaterally  ABDOMEN: soft, nondistended, nontender, bowel sounds normal  MS: extremities normal - no gross deformities noted, no evidence of inflammation in joints, no muscle " tenderness  SKIN: no rash    Results:  Last Comprehensive Metabolic Panel:  Sodium   Date Value Ref Range Status   07/10/2019 141 133 - 144 mmol/L Final     Potassium   Date Value Ref Range Status   07/10/2019 3.6 3.4 - 5.3 mmol/L Final     Chloride   Date Value Ref Range Status   07/10/2019 108 94 - 109 mmol/L Final     Carbon Dioxide   Date Value Ref Range Status   07/10/2019 27 20 - 32 mmol/L Final     Anion Gap   Date Value Ref Range Status   07/10/2019 6 3 - 14 mmol/L Final     Glucose   Date Value Ref Range Status   07/10/2019 102 (H) 70 - 99 mg/dL Final     Urea Nitrogen   Date Value Ref Range Status   07/10/2019 58 (H) 7 - 30 mg/dL Final     Creatinine   Date Value Ref Range Status   07/10/2019 1.81 (H) 0.52 - 1.04 mg/dL Final     GFR Estimate   Date Value Ref Range Status   07/10/2019 26 (L) >60 mL/min/[1.73_m2] Final     Comment:     Non  GFR Calc  Starting 12/18/2018, serum creatinine based estimated GFR (eGFR) will be   calculated using the Chronic Kidney Disease Epidemiology Collaboration   (CKD-EPI) equation.       Calcium   Date Value Ref Range Status   07/10/2019 8.5 8.5 - 10.1 mg/dL Final       Last Basic Metabolic Panel:  Lab Results   Component Value Date     11/08/2017      Lab Results   Component Value Date    POTASSIUM 3.5 11/08/2017     Lab Results   Component Value Date    CHLORIDE 104 11/08/2017     Lab Results   Component Value Date    ALEXANDRO 8.8 11/08/2017     Lab Results   Component Value Date    CO2 26 11/08/2017     Lab Results   Component Value Date    BUN 54 11/08/2017     Lab Results   Component Value Date    CR 2.36 11/08/2017     Lab Results   Component Value Date    GLC 88 11/08/2017         Marzena Martin

## 2019-07-10 NOTE — NURSING NOTE
"Chief Complaint   Patient presents with     RECHECK     3 month f/u     Vital signs:  Temp: 98.3  F (36.8  C) Temp src: Oral   Pulse: 72     SpO2: 99 %       Weight: 70.8 kg (156 lb)  Estimated body mass index is 24.43 kg/m  as calculated from the following:    Height as of 4/17/19: 1.702 m (5' 7\").    Weight as of this encounter: 70.8 kg (156 lb).        Aspen Dior    "

## 2019-07-10 NOTE — LETTER
7/10/2019       RE: Tessa Mansfield  [address]  [facility] [address]         Assessment and Plan:   84 year old female with history of long standing HTN, CAD s/p multiple stents, who presents for followup of CKD stage 3, baseline SCr 1.8-2.2 mg/dL without proteinuria.  1. CKD stage 3- baseline SCr 1.8-2.2mg/ dL, which is stable today at 1.8mg/dL. Mild/ minimal proteinuria     - she has lost weight and BP lower     - labs in 3 months and RTC 6 months  2. Electrolytes/Acid Base status- normal.    Hypokalemia- takes 3 potassium pills daily   - if she has more cramps, should let us know and eat high K diet.  3. Hypertension/Volume status- she has lost 10 lbs doing weight watchers, BP is 110-120 at home. She is on metoprolol 100 mg BID, furosemide 40 mg once a day, Amlodipine 2.5 mg daily and isosorbide 40 BID.   - at last visit, we had her increase furosemide to bid, which she did for a couple days but then returned to once a day as BP were good  - given lower BP and weight loss, will have her stop amlodipine, would restart if BP >140 -145 consistently  Last 2D ECHO in 07/2013 showed normal LVF 65 %, no significant valvular abnormalities, normal RV function.  -patient reports history of reaction/iontolerance to Spironolactone, Chlorthalidone, Clonidine and Terazosin in the past   -patient was advised to monitor her BP daily and call nephrology clinic if her BP is persistently above 140/90 mmHG    4. Anemia- Improved. Hgb is 12.4 g/dl. Iron studies were not recently checked    5. BMD  -normal serum calcium and phosphorus  -secondary hyperparathyroidism-PTH is 184. Patient had PTH of 144 in 11/17. We will follow  - vitamin D level is pending      Assessment and plan was discussed with patient and she voiced her understanding and agreement.        Reason for Visit:  Tessa Mansfield is a 84 year old female with HTN, who presents for CKD management.     HPI:  She is a pleasant female with PMMHx significant for  stage III CKD presumed secondary to hypertensive nephrosclerosis.Her renal function has been  relatively stable, ranging from 1.8-2.2 mg/dL over the years. She has history of CAD s/p multiple stents by Dr Mir (7 stents in all) since 2004 and a pacemaker in 2007. She follows with cardiologist Dr Santoyo .  Since her last visit she has been well , without major illness, no hospitalizations . Patient states that she has not been checking her blood pressure at home.  She denies dyspnea, can be fairly active though admits to being lazy. She denies GI or  issues. Her appetite is good and her  'takes care of her'- he does most of the cooking. Patient states that she follows low sodium diet.  She has joined weight watchers and has lost a significant amount of weight. She feels so much better, and hopes to lose a few more lbs.  She states her blood pressure is doing very well.  Her creatinine today is stable at 1.8      Home BP: not checked    Baseline Cr: 1.8-2.2    ROS:  A comprehensive review of systems was obtained and negative, except as noted in the HPI or PMH.    Active Medical Problems:  Patient Active Problem List   Diagnosis     Degeneration of cervical intervertebral disc     Nonallopathic lesion of cervical region     Nonallopathic lesion of thoracic region     CAD s/p PCI+BMS to MRCA 10/2002, PCI to mLCX 2/2003, PCI+DESx2 to pLAD 11/2007, PCI+DESx1 to dRCA 12/2007, PCI+ALVAREZ to RPDA 7/2013; on indefinite DAPT w/o angina     Dizziness and giddiness     Edema     Esophageal reflux     Gout     Essential hypertension     Obstructive sleep apnea     Osteomalacia     Post-menopausal osteoporosis     Cardiac Pacemaker- Medtronic, dual chamber- NOT dependant     Transient complete heart block (H)     Sinus node dysfunction (H)     Disturbance of skin sensation     NSTEMI (non-ST elevated myocardial infarction) (H)     Arrhythmia       Personal Hx:   Social History     Socioeconomic History     Marital status:       Spouse name: Not on file     Number of children: 4     Years of education: Not on file     Highest education level: Not on file   Occupational History     Employer: [employer]   Social Needs     Financial resource strain: Not on file     Food insecurity:     Worry: Not on file     Inability: Not on file     Transportation needs:     Medical: Not on file     Non-medical: Not on file   Tobacco Use     Smoking status: Former Smoker     Packs/day: 0.30     Years: 15.00     Pack years: 4.50     Types: Cigarettes     Smokeless tobacco: Never Used     Tobacco comment: Quit 22+ years ago   Substance and Sexual Activity     Alcohol use: Not on file     Drug use: No     Sexual activity: Not Currently     Partners: Male     Birth control/protection: Post-menopausal   Lifestyle     Physical activity:     Days per week: Not on file     Minutes per session: Not on file     Stress: Not on file   Relationships     Social connections:     Talks on phone: Not on file     Gets together: Not on file     Attends Buddhist service: Not on file     Active member of club or organization: Not on file     Attends meetings of clubs or organizations: Not on file     Relationship status: Not on file     Intimate partner violence:     Fear of current or ex partner: Not on file     Emotionally abused: Not on file     Physically abused: Not on file     Forced sexual activity: Not on file   Other Topics Concern      Service Not Asked     Blood Transfusions Yes     Caffeine Concern Not Asked     Occupational Exposure Not Asked     Hobby Hazards Not Asked     Sleep Concern Not Asked     Stress Concern Not Asked     Weight Concern Not Asked     Special Diet Not Asked     Back Care Not Asked     Exercise No     Bike Helmet Not Asked     Seat Belt Not Asked     Self-Exams Not Asked     Parent/sibling w/ CABG, MI or angioplasty before 65F 55M? Not Asked   Social History Narrative     Not on file  "      Allergies:  Allergies   Allergen Reactions     Bactrim [Sulfamethoxazole W/Trimethoprim] Other (See Comments)     Patient unable to recall     Biaxin [Clarithromycin]      Chlorthalidone Nausea and Vomiting     Clonidine      Codeine Sulfate Itching     Darvon [Propoxyphene Hcl]      Dilaudid [Hydromorphone] Visual Disturbance     Hallucinations       Gabapentin Other (See Comments) and Fatigue     \"felt drunk\"     Indocin      Levaquin [Levofloxacin Hemihydrate]      Lyrica Fatigue     Morphine Sulfate      Percocet [Oxycodone-Acetaminophen]      Spironolactone Other (See Comments)     Dehydrated       Terazosin      Ciprofloxacin Rash     Simvastatin Palpitations     Muscle weakness, leg cramping         Current Outpatient Medications   Medication     allopurinol (ZYLOPRIM) 100 MG tablet     amLODIPine (NORVASC) 2.5 MG tablet     aspirin 81 MG tablet     clopidogrel (PLAVIX) 75 MG tablet     cyanocolbalamin (VITAMIN B-12) 1000 MCG tablet     furosemide (LASIX) 40 MG tablet     isosorbide mononitrate (ISMO/MONOKET) 20 MG tablet     metoprolol tartrate (LOPRESSOR) 100 MG tablet     multivitamin  with lutein (OCUVITE WITH LTEIN) CAPS     NITROSTAT 0.4 MG sublingual tablet     omeprazole (PRILOSEC) 40 MG DR capsule     order for DME     potassium chloride ER (K-TAB/KLOR-CON) 10 MEQ CR tablet     pravastatin (PRAVACHOL) 80 MG tablet     timolol (TIMOPTIC) 0.5 % ophthalmic solution     No current facility-administered medications for this visit.        Vitals:  Pulse 82   Temp 97.4  F (36.3  C) (Oral)   Wt 71.1 kg (156 lb 12.8 oz)   SpO2 98%   BMI 24.56 kg/m       Exam:  GENERAL APPEARANCE: alert and no distress  HENT: mouth without ulcers or lesions  LYMPHATICS: no cervical or supraclavicular nodes  RESP: lungs clear to auscultation - no rales, rhonchi or wheezes  CV: regular rhythm, normal rate, no rub, no murmur  EDEMA: 1+ pitting LE edema bilaterally  ABDOMEN: soft, nondistended, nontender, bowel sounds " normal  MS: extremities normal - no gross deformities noted, no evidence of inflammation in joints, no muscle tenderness  SKIN: no rash    Results:  Last Comprehensive Metabolic Panel:  Sodium   Date Value Ref Range Status   07/10/2019 141 133 - 144 mmol/L Final     Potassium   Date Value Ref Range Status   07/10/2019 3.6 3.4 - 5.3 mmol/L Final     Chloride   Date Value Ref Range Status   07/10/2019 108 94 - 109 mmol/L Final     Carbon Dioxide   Date Value Ref Range Status   07/10/2019 27 20 - 32 mmol/L Final     Anion Gap   Date Value Ref Range Status   07/10/2019 6 3 - 14 mmol/L Final     Glucose   Date Value Ref Range Status   07/10/2019 102 (H) 70 - 99 mg/dL Final     Urea Nitrogen   Date Value Ref Range Status   07/10/2019 58 (H) 7 - 30 mg/dL Final     Creatinine   Date Value Ref Range Status   07/10/2019 1.81 (H) 0.52 - 1.04 mg/dL Final     GFR Estimate   Date Value Ref Range Status   07/10/2019 26 (L) >60 mL/min/[1.73_m2] Final     Comment:     Non  GFR Calc  Starting 12/18/2018, serum creatinine based estimated GFR (eGFR) will be   calculated using the Chronic Kidney Disease Epidemiology Collaboration   (CKD-EPI) equation.       Calcium   Date Value Ref Range Status   07/10/2019 8.5 8.5 - 10.1 mg/dL Final       Last Basic Metabolic Panel:  Lab Results   Component Value Date     11/08/2017      Lab Results   Component Value Date    POTASSIUM 3.5 11/08/2017     Lab Results   Component Value Date    CHLORIDE 104 11/08/2017     Lab Results   Component Value Date    ALEXANDRO 8.8 11/08/2017     Lab Results   Component Value Date    CO2 26 11/08/2017     Lab Results   Component Value Date    BUN 54 11/08/2017     Lab Results   Component Value Date    CR 2.36 11/08/2017     Lab Results   Component Value Date    GLC 88 11/08/2017           Marzena Maritn MD

## 2019-07-16 DIAGNOSIS — Z95.820 S/P ANGIOPLASTY WITH STENT: ICD-10-CM

## 2019-07-16 DIAGNOSIS — I21.4 NSTEMI (NON-ST ELEVATED MYOCARDIAL INFARCTION) (H): ICD-10-CM

## 2019-07-17 RX ORDER — CLOPIDOGREL BISULFATE 75 MG/1
TABLET ORAL
Qty: 90 TABLET | Refills: 1 | Status: SHIPPED | OUTPATIENT
Start: 2019-07-17 | End: 2020-01-16

## 2019-07-17 NOTE — TELEPHONE ENCOUNTER
clopidogrel (PLAVIX) 75 MG tablet        Last Written Prescription Date:  1/15/19  Last Fill Quantity: 90,   # refills: 1  Last Office Visit : 6/24/19  Future Office visit:  12/17/19    Routing refill request to provider for review/approval because:  Med failed protocol PPI on file and over due platelet count.

## 2019-07-24 ENCOUNTER — CARE COORDINATION (OUTPATIENT)
Dept: NEPHROLOGY | Facility: CLINIC | Age: 84
End: 2019-07-24

## 2019-07-24 NOTE — PROGRESS NOTES
Nephrology Note: Nursing Outreach Encounter    REASON FOR CALL:                                                      REASON FOR CALL: Blood Pressure Follow Up                                          SITUATION/BACKROUND:                                                    Patient is being treated for CKD Stage 3.      ASSESSMENT:                                                      Checked in with Tessa. She's been doing well since the appointment. BP sits between 111/64 - 134/81. She still has occasional edema, but lasix helps with this. Also notes some knee pain, but is otherwise asymptomatic. Denied any questions or concerns for us today.    Uremic Symptoms: Yes,  Edema: Yes;       PLAN:                                                      Follow Up:   Patient to call/[facility] message with updates     Patient verbalized understanding and will contact the clinic with any further questions or concerns.     Christy Roblero RN

## 2019-07-24 NOTE — PROGRESS NOTES
Spoke with patient, she's currently at another appointment. Will call back this afternoon.    Christy Roblero RN

## 2019-08-20 ENCOUNTER — ANCILLARY PROCEDURE (OUTPATIENT)
Dept: CARDIOLOGY | Facility: CLINIC | Age: 84
End: 2019-08-20
Attending: INTERNAL MEDICINE
Payer: COMMERCIAL

## 2019-08-20 DIAGNOSIS — I49.5 SSS (SICK SINUS SYNDROME) (H): ICD-10-CM

## 2019-08-20 LAB
MDC_IDC_LEAD_IMPLANT_DT: NORMAL
MDC_IDC_LEAD_IMPLANT_DT: NORMAL
MDC_IDC_LEAD_LOCATION: NORMAL
MDC_IDC_LEAD_LOCATION: NORMAL
MDC_IDC_LEAD_LOCATION_DETAIL_1: NORMAL
MDC_IDC_LEAD_LOCATION_DETAIL_1: NORMAL
MDC_IDC_LEAD_MFG: NORMAL
MDC_IDC_LEAD_MFG: NORMAL
MDC_IDC_LEAD_MODEL: NORMAL
MDC_IDC_LEAD_MODEL: NORMAL
MDC_IDC_LEAD_POLARITY_TYPE: NORMAL
MDC_IDC_LEAD_POLARITY_TYPE: NORMAL
MDC_IDC_LEAD_SERIAL: NORMAL
MDC_IDC_LEAD_SERIAL: NORMAL
MDC_IDC_MSMT_BATTERY_DTM: NORMAL
MDC_IDC_MSMT_BATTERY_IMPEDANCE: 4930 OHM
MDC_IDC_MSMT_BATTERY_REMAINING_LONGEVITY: 7 MO
MDC_IDC_MSMT_BATTERY_STATUS: NORMAL
MDC_IDC_MSMT_BATTERY_VOLTAGE: 2.63 V
MDC_IDC_MSMT_LEADCHNL_RA_IMPEDANCE_VALUE: 424 OHM
MDC_IDC_MSMT_LEADCHNL_RA_PACING_THRESHOLD_AMPLITUDE: 0.5 V
MDC_IDC_MSMT_LEADCHNL_RA_PACING_THRESHOLD_PULSEWIDTH: 0.4 MS
MDC_IDC_MSMT_LEADCHNL_RV_IMPEDANCE_VALUE: 492 OHM
MDC_IDC_MSMT_LEADCHNL_RV_PACING_THRESHOLD_AMPLITUDE: 0.75 V
MDC_IDC_MSMT_LEADCHNL_RV_PACING_THRESHOLD_PULSEWIDTH: 0.4 MS
MDC_IDC_MSMT_LEADCHNL_RV_SENSING_INTR_AMPL: 11.2 MV
MDC_IDC_PG_IMPLANT_DTM: NORMAL
MDC_IDC_PG_MFG: NORMAL
MDC_IDC_PG_MODEL: NORMAL
MDC_IDC_PG_SERIAL: NORMAL
MDC_IDC_PG_TYPE: NORMAL
MDC_IDC_SESS_CLINIC_NAME: NORMAL
MDC_IDC_SESS_DTM: NORMAL
MDC_IDC_SESS_TYPE: NORMAL
MDC_IDC_SET_BRADY_AT_MODE_SWITCH_MODE: NORMAL
MDC_IDC_SET_BRADY_AT_MODE_SWITCH_RATE: 175 {BEATS}/MIN
MDC_IDC_SET_BRADY_LOWRATE: 60 {BEATS}/MIN
MDC_IDC_SET_BRADY_MAX_SENSOR_RATE: 130 {BEATS}/MIN
MDC_IDC_SET_BRADY_MAX_TRACKING_RATE: 130 {BEATS}/MIN
MDC_IDC_SET_BRADY_MODE: NORMAL
MDC_IDC_SET_BRADY_PAV_DELAY_LOW: 150 MS
MDC_IDC_SET_BRADY_SAV_DELAY_LOW: 120 MS
MDC_IDC_SET_LEADCHNL_RA_PACING_AMPLITUDE: 1.5 V
MDC_IDC_SET_LEADCHNL_RA_PACING_ANODE_ELECTRODE_1: NORMAL
MDC_IDC_SET_LEADCHNL_RA_PACING_ANODE_LOCATION_1: NORMAL
MDC_IDC_SET_LEADCHNL_RA_PACING_CAPTURE_MODE: NORMAL
MDC_IDC_SET_LEADCHNL_RA_PACING_CATHODE_ELECTRODE_1: NORMAL
MDC_IDC_SET_LEADCHNL_RA_PACING_CATHODE_LOCATION_1: NORMAL
MDC_IDC_SET_LEADCHNL_RA_PACING_POLARITY: NORMAL
MDC_IDC_SET_LEADCHNL_RA_PACING_PULSEWIDTH: 0.4 MS
MDC_IDC_SET_LEADCHNL_RA_SENSING_ANODE_ELECTRODE_1: NORMAL
MDC_IDC_SET_LEADCHNL_RA_SENSING_ANODE_LOCATION_1: NORMAL
MDC_IDC_SET_LEADCHNL_RA_SENSING_CATHODE_ELECTRODE_1: NORMAL
MDC_IDC_SET_LEADCHNL_RA_SENSING_CATHODE_LOCATION_1: NORMAL
MDC_IDC_SET_LEADCHNL_RA_SENSING_POLARITY: NORMAL
MDC_IDC_SET_LEADCHNL_RA_SENSING_SENSITIVITY: 0.5 MV
MDC_IDC_SET_LEADCHNL_RV_PACING_AMPLITUDE: 2 V
MDC_IDC_SET_LEADCHNL_RV_PACING_ANODE_ELECTRODE_1: NORMAL
MDC_IDC_SET_LEADCHNL_RV_PACING_ANODE_LOCATION_1: NORMAL
MDC_IDC_SET_LEADCHNL_RV_PACING_CAPTURE_MODE: NORMAL
MDC_IDC_SET_LEADCHNL_RV_PACING_CATHODE_ELECTRODE_1: NORMAL
MDC_IDC_SET_LEADCHNL_RV_PACING_CATHODE_LOCATION_1: NORMAL
MDC_IDC_SET_LEADCHNL_RV_PACING_POLARITY: NORMAL
MDC_IDC_SET_LEADCHNL_RV_PACING_PULSEWIDTH: 0.4 MS
MDC_IDC_SET_LEADCHNL_RV_SENSING_ANODE_ELECTRODE_1: NORMAL
MDC_IDC_SET_LEADCHNL_RV_SENSING_ANODE_LOCATION_1: NORMAL
MDC_IDC_SET_LEADCHNL_RV_SENSING_CATHODE_ELECTRODE_1: NORMAL
MDC_IDC_SET_LEADCHNL_RV_SENSING_CATHODE_LOCATION_1: NORMAL
MDC_IDC_SET_LEADCHNL_RV_SENSING_POLARITY: NORMAL
MDC_IDC_SET_LEADCHNL_RV_SENSING_SENSITIVITY: 4 MV
MDC_IDC_SET_ZONE_DETECTION_INTERVAL: 333.33 MS
MDC_IDC_SET_ZONE_DETECTION_INTERVAL: 342.86 MS
MDC_IDC_SET_ZONE_TYPE: NORMAL
MDC_IDC_SET_ZONE_TYPE: NORMAL
MDC_IDC_STAT_AT_BURDEN_PERCENT: 0.1 %
MDC_IDC_STAT_AT_DTM_END: NORMAL
MDC_IDC_STAT_AT_DTM_START: NORMAL
MDC_IDC_STAT_AT_MODE_SW_COUNT: 33
MDC_IDC_STAT_BRADY_AP_VP_PERCENT: 0 %
MDC_IDC_STAT_BRADY_AP_VS_PERCENT: 89 %
MDC_IDC_STAT_BRADY_AS_VP_PERCENT: 0 %
MDC_IDC_STAT_BRADY_AS_VS_PERCENT: 11 %
MDC_IDC_STAT_BRADY_DTM_END: NORMAL
MDC_IDC_STAT_BRADY_DTM_START: NORMAL
MDC_IDC_STAT_EPISODE_RECENT_COUNT: 0
MDC_IDC_STAT_EPISODE_RECENT_COUNT: 0
MDC_IDC_STAT_EPISODE_RECENT_COUNT: 33
MDC_IDC_STAT_EPISODE_RECENT_COUNT_DTM_END: NORMAL
MDC_IDC_STAT_EPISODE_RECENT_COUNT_DTM_START: NORMAL
MDC_IDC_STAT_EPISODE_TYPE: NORMAL

## 2019-08-20 PROCEDURE — 93296 REM INTERROG EVL PM/IDS: CPT | Mod: ZF

## 2019-08-20 PROCEDURE — 93294 REM INTERROG EVL PM/LDLS PM: CPT | Mod: ZP | Performed by: INTERNAL MEDICINE

## 2019-10-01 ENCOUNTER — ANCILLARY PROCEDURE (OUTPATIENT)
Dept: CARDIOLOGY | Facility: CLINIC | Age: 84
End: 2019-10-01
Attending: INTERNAL MEDICINE
Payer: COMMERCIAL

## 2019-10-01 DIAGNOSIS — I49.5 SICK SINUS SYNDROME (H): ICD-10-CM

## 2019-10-01 PROCEDURE — 93296 REM INTERROG EVL PM/IDS: CPT | Mod: ZF

## 2019-10-01 PROCEDURE — 99207 CARDIAC DEVICE CHECK - REMOTE: CPT | Mod: ZP | Performed by: INTERNAL MEDICINE

## 2019-10-14 ENCOUNTER — ANCILLARY PROCEDURE (OUTPATIENT)
Dept: CARDIOLOGY | Facility: CLINIC | Age: 84
End: 2019-10-14
Attending: INTERNAL MEDICINE
Payer: COMMERCIAL

## 2019-10-14 DIAGNOSIS — Z95.0 CARDIAC PACEMAKER IN SITU: Primary | ICD-10-CM

## 2019-10-14 DIAGNOSIS — I49.5 SICK SINUS SYNDROME (H): ICD-10-CM

## 2019-10-14 DIAGNOSIS — I49.5 SICK SINUS SYNDROME (H): Primary | ICD-10-CM

## 2019-10-14 DIAGNOSIS — I25.119 CORONARY ARTERY DISEASE INVOLVING NATIVE HEART WITH ANGINA PECTORIS, UNSPECIFIED VESSEL OR LESION TYPE (H): ICD-10-CM

## 2019-10-14 PROCEDURE — 99207 CARDIAC DEVICE CHECK - REMOTE: CPT | Performed by: INTERNAL MEDICINE

## 2019-10-14 PROCEDURE — 93296 REM INTERROG EVL PM/IDS: CPT | Mod: ZF

## 2019-10-14 RX ORDER — CEFAZOLIN SODIUM 2 G/50ML
2 SOLUTION INTRAVENOUS
Status: CANCELLED | OUTPATIENT
Start: 2019-10-14

## 2019-10-14 RX ORDER — LIDOCAINE 40 MG/G
CREAM TOPICAL
Status: CANCELLED | OUTPATIENT
Start: 2019-10-14

## 2019-10-14 RX ORDER — SODIUM CHLORIDE 9 MG/ML
INJECTION, SOLUTION INTRAVENOUS CONTINUOUS
Status: CANCELLED | OUTPATIENT
Start: 2019-10-14

## 2019-10-15 ENCOUNTER — HOSPITAL ENCOUNTER (OUTPATIENT)
Dept: CARDIOLOGY | Facility: CLINIC | Age: 84
End: 2019-10-15
Attending: INTERNAL MEDICINE | Admitting: INTERNAL MEDICINE
Payer: COMMERCIAL

## 2019-10-15 ENCOUNTER — HOSPITAL ENCOUNTER (OUTPATIENT)
Facility: CLINIC | Age: 84
Discharge: HOME | End: 2019-10-15
Attending: INTERNAL MEDICINE | Admitting: INTERNAL MEDICINE
Payer: COMMERCIAL

## 2019-10-15 ENCOUNTER — APPOINTMENT (OUTPATIENT)
Dept: MEDSURG UNIT | Facility: CLINIC | Age: 84
End: 2019-10-15
Attending: INTERNAL MEDICINE
Payer: COMMERCIAL

## 2019-10-15 ENCOUNTER — APPOINTMENT (OUTPATIENT)
Dept: LAB | Facility: CLINIC | Age: 84
End: 2019-10-15
Attending: INTERNAL MEDICINE
Payer: COMMERCIAL

## 2019-10-15 VITALS
HEIGHT: 67 IN | WEIGHT: 160 LBS | OXYGEN SATURATION: 91 % | RESPIRATION RATE: 16 BRPM | SYSTOLIC BLOOD PRESSURE: 149 MMHG | BODY MASS INDEX: 25.11 KG/M2 | HEART RATE: 59 BPM | DIASTOLIC BLOOD PRESSURE: 61 MMHG | TEMPERATURE: 97.8 F

## 2019-10-15 DIAGNOSIS — I49.5 SICK SINUS SYNDROME (H): ICD-10-CM

## 2019-10-15 DIAGNOSIS — I25.119 CORONARY ARTERY DISEASE INVOLVING NATIVE HEART WITH ANGINA PECTORIS, UNSPECIFIED VESSEL OR LESION TYPE (H): ICD-10-CM

## 2019-10-15 DIAGNOSIS — Z95.0 CARDIAC PACEMAKER IN SITU: Primary | ICD-10-CM

## 2019-10-15 LAB
ANION GAP SERPL CALCULATED.3IONS-SCNC: 5 MMOL/L (ref 3–14)
BUN SERPL-MCNC: 56 MG/DL (ref 7–30)
CALCIUM SERPL-MCNC: 8.5 MG/DL (ref 8.5–10.1)
CHLORIDE SERPL-SCNC: 115 MMOL/L (ref 94–109)
CO2 SERPL-SCNC: 23 MMOL/L (ref 20–32)
CREAT SERPL-MCNC: 2.19 MG/DL (ref 0.52–1.04)
ERYTHROCYTE [DISTWIDTH] IN BLOOD BY AUTOMATED COUNT: 14 % (ref 10–15)
GFR SERPL CREATININE-BSD FRML MDRD: 20 ML/MIN/{1.73_M2}
GLUCOSE SERPL-MCNC: 87 MG/DL (ref 70–99)
HCT VFR BLD AUTO: 39.1 % (ref 35–47)
HGB BLD-MCNC: 11.8 G/DL (ref 11.7–15.7)
MCH RBC QN AUTO: 29.9 PG (ref 26.5–33)
MCHC RBC AUTO-ENTMCNC: 30.2 G/DL (ref 31.5–36.5)
MCV RBC AUTO: 99 FL (ref 78–100)
PLATELET # BLD AUTO: 156 10E9/L (ref 150–450)
POTASSIUM SERPL-SCNC: 4.4 MMOL/L (ref 3.4–5.3)
RBC # BLD AUTO: 3.95 10E12/L (ref 3.8–5.2)
SODIUM SERPL-SCNC: 142 MMOL/L (ref 133–144)
WBC # BLD AUTO: 8 10E9/L (ref 4–11)

## 2019-10-15 PROCEDURE — 33228 REMV&REPLC PM GEN DUAL LEAD: CPT | Mod: GC | Performed by: INTERNAL MEDICINE

## 2019-10-15 PROCEDURE — 93005 ELECTROCARDIOGRAM TRACING: CPT

## 2019-10-15 PROCEDURE — 25000132 ZZH RX MED GY IP 250 OP 250 PS 637: Performed by: INTERNAL MEDICINE

## 2019-10-15 PROCEDURE — 33228 REMV&REPLC PM GEN DUAL LEAD: CPT | Performed by: INTERNAL MEDICINE

## 2019-10-15 PROCEDURE — 93010 ELECTROCARDIOGRAM REPORT: CPT | Mod: 76 | Performed by: INTERNAL MEDICINE

## 2019-10-15 PROCEDURE — 93306 TTE W/DOPPLER COMPLETE: CPT

## 2019-10-15 PROCEDURE — 36415 COLL VENOUS BLD VENIPUNCTURE: CPT | Performed by: INTERNAL MEDICINE

## 2019-10-15 PROCEDURE — 27210794 ZZH OR GENERAL SUPPLY STERILE: Performed by: INTERNAL MEDICINE

## 2019-10-15 PROCEDURE — 80048 BASIC METABOLIC PNL TOTAL CA: CPT | Performed by: INTERNAL MEDICINE

## 2019-10-15 PROCEDURE — 25000128 H RX IP 250 OP 636: Performed by: NURSE PRACTITIONER

## 2019-10-15 PROCEDURE — 40000172 ZZH STATISTIC PROCEDURE PREP ONLY

## 2019-10-15 PROCEDURE — 85027 COMPLETE CBC AUTOMATED: CPT | Performed by: INTERNAL MEDICINE

## 2019-10-15 PROCEDURE — 93306 TTE W/DOPPLER COMPLETE: CPT | Mod: 26 | Performed by: INTERNAL MEDICINE

## 2019-10-15 PROCEDURE — 99152 MOD SED SAME PHYS/QHP 5/>YRS: CPT | Performed by: INTERNAL MEDICINE

## 2019-10-15 PROCEDURE — 99153 MOD SED SAME PHYS/QHP EA: CPT | Performed by: INTERNAL MEDICINE

## 2019-10-15 PROCEDURE — 40000065 ZZH STATISTIC EKG NON-CHARGEABLE

## 2019-10-15 PROCEDURE — 25000128 H RX IP 250 OP 636: Performed by: INTERNAL MEDICINE

## 2019-10-15 PROCEDURE — C1785 PMKR, DUAL, RATE-RESP: HCPCS | Performed by: INTERNAL MEDICINE

## 2019-10-15 PROCEDURE — 25000125 ZZHC RX 250: Performed by: INTERNAL MEDICINE

## 2019-10-15 DEVICE — PACEMAKER AZURE MRI XT DR: Type: IMPLANTABLE DEVICE | Status: FUNCTIONAL

## 2019-10-15 RX ORDER — NITROGLYCERIN 0.4 MG/1
TABLET SUBLINGUAL
Status: DISCONTINUED | OUTPATIENT
Start: 2019-10-15 | End: 2019-10-15 | Stop reason: HOSPADM

## 2019-10-15 RX ORDER — FENTANYL CITRATE 50 UG/ML
INJECTION, SOLUTION INTRAMUSCULAR; INTRAVENOUS
Status: DISCONTINUED | OUTPATIENT
Start: 2019-10-15 | End: 2019-10-15 | Stop reason: HOSPADM

## 2019-10-15 RX ORDER — NALOXONE HYDROCHLORIDE 0.4 MG/ML
.1-.4 INJECTION, SOLUTION INTRAMUSCULAR; INTRAVENOUS; SUBCUTANEOUS
Status: DISCONTINUED | OUTPATIENT
Start: 2019-10-15 | End: 2019-10-15 | Stop reason: HOSPADM

## 2019-10-15 RX ORDER — CEPHALEXIN 500 MG/1
500 TABLET ORAL 3 TIMES DAILY
Qty: 15 TABLET | Refills: 0 | Status: SHIPPED | OUTPATIENT
Start: 2019-10-15 | End: 2020-01-06

## 2019-10-15 RX ORDER — CEFAZOLIN SODIUM 2 G/100ML
2 INJECTION, SOLUTION INTRAVENOUS
Status: DISCONTINUED | OUTPATIENT
Start: 2019-10-15 | End: 2019-10-15 | Stop reason: HOSPADM

## 2019-10-15 RX ORDER — LIDOCAINE 40 MG/G
CREAM TOPICAL
Status: DISCONTINUED | OUTPATIENT
Start: 2019-10-15 | End: 2019-10-15 | Stop reason: HOSPADM

## 2019-10-15 RX ORDER — DIPHENHYDRAMINE HYDROCHLORIDE 50 MG/ML
INJECTION INTRAMUSCULAR; INTRAVENOUS
Status: DISCONTINUED | OUTPATIENT
Start: 2019-10-15 | End: 2019-10-15 | Stop reason: HOSPADM

## 2019-10-15 RX ORDER — SODIUM CHLORIDE 9 MG/ML
INJECTION, SOLUTION INTRAVENOUS CONTINUOUS
Status: DISCONTINUED | OUTPATIENT
Start: 2019-10-15 | End: 2019-10-15 | Stop reason: HOSPADM

## 2019-10-15 RX ORDER — CEFAZOLIN SODIUM 1 G/3ML
1 INJECTION, POWDER, FOR SOLUTION INTRAMUSCULAR; INTRAVENOUS
Status: DISCONTINUED | OUTPATIENT
Start: 2019-10-15 | End: 2019-10-15 | Stop reason: HOSPADM

## 2019-10-15 RX ORDER — HYDRALAZINE HYDROCHLORIDE 20 MG/ML
10 INJECTION INTRAMUSCULAR; INTRAVENOUS ONCE
Status: COMPLETED | OUTPATIENT
Start: 2019-10-15 | End: 2019-10-15

## 2019-10-15 RX ORDER — DOBUTAMINE HYDROCHLORIDE 200 MG/100ML
5-40 INJECTION INTRAVENOUS CONTINUOUS PRN
Status: DISCONTINUED | OUTPATIENT
Start: 2019-10-15 | End: 2019-10-15 | Stop reason: HOSPADM

## 2019-10-15 RX ADMIN — HYDRALAZINE HYDROCHLORIDE 10 MG: 20 INJECTION INTRAMUSCULAR; INTRAVENOUS at 16:06

## 2019-10-15 ASSESSMENT — MIFFLIN-ST. JEOR: SCORE: 1218.39

## 2019-10-15 NOTE — DISCHARGE INSTRUCTIONS
Home Care after a Pacemaker Battery Change    Wound care:  Keep your incision (surgery wound) dry for 3 days.  After 3 days, you may remove the outer bandage.  Keep the strips of tape on.  They will be removed at your clinic visit.  Check for signs of infection each day.  These include increased redness, swelling, drainage or a fever over 101 F (38.3 C).  Call us immediately if you see any of these signs.  If there are no signs of infection, you may shower in 3 days.  Do not submerge the incision (in a bath tub, hot tub, or swimming pool) until fully healed.    Pain:   You may have mild to moderate pain for 3 to 5 days.  Take acetaminophen (Tylenol) or ibuprofen (Advil) for the pain.  Call us if the pain is severe or lasts more than 5 days.    Activity:  After 24 hours, slowly return to your normal activities.  Healing will take 4 to 6 weeks.    Do not drive for 24 hours.    For 3 days, do not raise your affected arm above your shoulder.    For 10 days, do not use your affected arm to push, pull or lift anything over 10 pounds.    Avoid anything that may cause rough contact or a hard hit to your chest.  This includes football, hockey, and other contact sports.    Follow Up Visits:  Return to the clinic in 7 to 10 days to have your device and wound checked.      Telling others about your device:  Before you have any medical tests or treatments, tell the doctors, dentists, and other care providers about your device.  There are a few tests and treatments that may interfere with your device.  (These include MRI, radiation therapy, electrocautery, and others.)  Your care team may need to take special steps to keep you safe.  Before you leave the hospital, you will receive a temporary ID card.  A permanent card will be mailed to you about 6 to 8 weeks later.  Always carry the ID card with you.  It has important details about your device.  You should also get a MedicAlert ID.  Please ask us for a MedicAlert brochure, or  go to [email or web address].    Safety near electrical equipment:  All of these are safe to use when in good repair -    Microwaves    Radios    Cordless phone    Remote controls    Small electrical tools  Cell phones: Keep cell phones at least 6 inches from your device.  Do not carry the phone in a pocket near your device.  Security larose: It is okay to walk through security larose at the airports and department stores.  Tell airport security that you have a pacemaker.  They should keep the screening wand at least 6 inches from your device.  Full-body scanners are safe.    Avoid the following:    Arc welding, chain saws and high-powered industrial or commercial tools.    Power lines, power plants and large power generators.    Electric body fat scales.    Magnetic mattress pads or pillow.    Questions?  Please call [facility].    Device Nurse:          Business Hours:  [phone]                       After Hours:  [phone]   Choose option 4, then ask for the on-call device nurse at job code [id number].    Your next device clinic appointment is scheduled on:    Thursday October 24th at 10:30 am.   [facility]  [facility] - Clinic [address]  [address]

## 2019-10-15 NOTE — H&P
Electrophysiology Pre-Procedure History and Physical    Tessa Mansfield MRN# [id number]   Age: 84 year old YOB: 1935      Date of Procedure: 10/15/2019  Location [facility]      Date of Exam 10/15/2019 Facility (Same day)       Home clinic: [facility]  Primary care provider: Pedro Gomez         Active problem list:     Patient Active Problem List    Diagnosis Date Noted     Sick sinus syndrome (H) 10/14/2019     Priority: Medium     Added automatically from request for surgery [id number]       Arrhythmia 09/15/2013     Priority: Medium     NSTEMI (non-ST elevated myocardial infarction) (H) 07/23/2013     Priority: Medium     Sinus node dysfunction (H) 12/19/2012     Priority: Medium     Dizziness and giddiness 02/21/2012     Priority: Medium     Edema 02/21/2012     Priority: Medium     Esophageal reflux 02/21/2012     Priority: Medium     Gout 02/21/2012     Priority: Medium     Problem list name updated by automated process. Provider to review       Essential hypertension 02/21/2012     Priority: Medium     Problem list name updated by automated process. Provider to review       Obstructive sleep apnea 02/21/2012     Priority: Medium     Osteomalacia 02/21/2012     Priority: Medium     Post-menopausal osteoporosis 02/21/2012     Priority: Medium     (Problem list name updated by automated process. Provider to review and confirm.)       Disturbance of skin sensation 06/19/2009     Priority: Medium     Class: Chronic     Pins, needles, burning hot pain in feet for four years.       Degeneration of cervical intervertebral disc 02/08/2008     Priority: Medium     Nonallopathic lesion of cervical region 02/08/2008     Priority: Medium     Problem list name updated by automated process. Provider to review       Nonallopathic lesion of thoracic region 02/08/2008     Priority: Medium     Problem list name updated by automated process. Provider to review        Cardiac Pacemaker- Medtronic, dual chamber- NOT dependant 11/28/2007     Priority: Medium     Transient complete heart block (H) 11/28/2007     Priority: Medium     CAD s/p PCI+BMS to MRCA 10/2002, PCI to mLCX 2/2003, PCI+DESx2 to pLAD 11/2007, PCI+DESx1 to dRCA 12/2007, PCI+ALVAREZ to RPDA 7/2013; on indefinite DAPT w/o angina 10/27/2002     Priority: Medium     10/27/2002: AMI s/p PCI+BMS (3.5x18mm Bx velocity) to mRCA  2/5/2003: Back pain; PCI+stent to mLCX c/b distal embolization/slow flow  4/11/2003: Unstable angina; PCI+stent (Hepacoat velocity) to mLAD  11/27/2007: PCI+DESx2 to pLAD (IVUS w/ calcification of LMCA and ulcerated plaque pLAD, 80% dRCA; also had 80% dLCX, too small to stent)  12/11/2007: Staged PCI. PCI+DESx1 (3.5x13mm Cypher) to dRCA (indefinite DAPT recommended at this time)  6/27/2008: Angina. mLCX stent 70% ISR. LAD and RCA stents patent. Myocardium at risk from LCX felt to be small, medical management preferred to PCI.  11/10/2009: Angina. Findings unchanged from 6/27/2008, FFR LAD 0.90. Medical management recommended.  7/23/2013: Unstable angina; PCI+ALVAREZ to mPDA. Diagnostic findings: LMCA 40% distal. LAD: pLAD stents patent mLAD 30% ISR dLAD 50% diffuse, D2 diffuse disease. LCX 80% mid ISR. RCA diffuse <30% mid, RPLAs diffuse disease, RPDA 100% occlusion.                 Medications (include herbals and vitamins):      Current Facility-Administered Medications   Medication     ceFAZolin (ANCEF) 1 g vial to attach to  ml bag for ADULT or 50 ml bag for PEDS     ceFAZolin (ANCEF) intermittent infusion 2 g in 100 mL dextrose PRE-MIX     DOBUTamine 500 mg in dextrose 5% 250 mL (adult std conc) premix     fentaNYL (PF) (SUBLIMAZE) 250 mcg in D5W 100 mL infusion     isoproterenol (ISUPREL) 200 mcg/50 mL pre-mix     lidocaine (LMX4) cream     lidocaine 1 % 0.1-1 mL     midazolam (VERSED) 20 mg in D5W 100 mL infusion     sodium chloride (PF) 0.9% PF flush 3 mL     sodium chloride (PF) 0.9% PF  flush 3 mL     sodium chloride 0.9% infusion         Tessa Mansfield   Home Medication Instructions CLAY:[id number]    Printed on:10/15/19 1321   Medication Information                      allopurinol (ZYLOPRIM) 100 MG tablet  Take 1 tablet (100 mg) by mouth daily             amLODIPine (NORVASC) 2.5 MG tablet  Take 1 tablet (2.5 mg) by mouth daily             aspirin 81 MG tablet  Take 1 tablet by mouth daily.             clopidogrel (PLAVIX) 75 MG tablet  TAKE 1 TABLET BY MOUTH ONCE DAILY             cyanocolbalamin (VITAMIN B-12) 1000 MCG tablet  Take 500 mcg by mouth daily As directed             furosemide (LASIX) 40 MG tablet  Take 1 tablet (40 mg) by mouth daily             isosorbide mononitrate (ISMO/MONOKET) 20 MG tablet  Take 2 tablets (40 mg) by mouth 3 times daily Please make a follow up appointment for further refills.             metoprolol tartrate (LOPRESSOR) 100 MG tablet  Take 1 tablet (100 mg) by mouth 2 times daily             multivitamin  with lutein (OCUVITE WITH LTEIN) CAPS  Take 1 capsule by mouth daily             NITROSTAT 0.4 MG sublingual tablet  DISSOLVE ONE TABLET UNDER THE TONGUE EVERY 5 MINUTES AS NEEDED FOR CHEST PAIN.  DO NOT EXCEED A TOTAL OF 3 DOSES IN 15 MINUTES. CALL 911.             omeprazole (PRILOSEC) 40 MG DR capsule  TAKE ONE CAPSULE BY MOUTH ONCE DAILY             order for DME  Equipment being ordered: Left hand and wrist brace             potassium chloride ER (K-TAB/KLOR-CON) 10 MEQ CR tablet  TAKE TWO TABLETS BY MOUTH DAILY IN THE MORNING AND ONE TABLET IN THE EVENING             pravastatin (PRAVACHOL) 80 MG tablet  TAKE 1 TABLET BY MOUTH ONCE DAILY AT BEDTIME             timolol (TIMOPTIC) 0.5 % ophthalmic solution  Place 1 drop into both eyes daily                       Allergies:      Allergies   Allergen Reactions     Bactrim [Sulfamethoxazole W/Trimethoprim] Other (See Comments)     Patient unable to recall     Biaxin [Clarithromycin]      Chlorthalidone  "Nausea and Vomiting     Clonidine      Codeine Sulfate Itching     Darvon [Propoxyphene Hcl]      Dilaudid [Hydromorphone] Visual Disturbance     Hallucinations       Gabapentin Other (See Comments) and Fatigue     \"felt drunk\"     Indocin      Levaquin [Levofloxacin Hemihydrate]      Lyrica Fatigue     Morphine Sulfate      Percocet [Oxycodone-Acetaminophen] Other (See Comments)     hallucinations     Pregabalin Itching     Other reaction(s): Fatigue     Shrimp Swelling     Spironolactone Other (See Comments)     Dehydrated       Terazosin      Ciprofloxacin Rash     Simvastatin Palpitations     Muscle weakness, leg cramping               Social History:     Social History     Tobacco Use     Smoking status: Former Smoker     Packs/day: 0.30     Years: 15.00     Pack years: 4.50     Types: Cigarettes     Smokeless tobacco: Never Used     Tobacco comment: Quit 22+ years ago   Substance Use Topics     Alcohol use: Not on file            Physical Exam:   All vitals have been reviewed  Patient Vitals for the past 8 hrs:   BP Resp SpO2 Height Weight   10/15/19 1204 (!) 157/81 20 98 % 1.702 m (5' 7\") 72.6 kg (160 lb)   10/15/19 1145 (!) 157/81 20 -- -- --     No intake/output data recorded.  Airway assessment:   Patient is able to open mouth wide  Patient is able to stick out tongue}      ENT:   Normocephalic, without obvious abnormality, atraumatic, sinuses nontender on palpation, external ears without lesions, oral pharynx with moist mucous membranes, tonsils without erythema or exudates, gums normal and good dentition.     Neck:   Supple, symmetrical, trachea midline, no adenopathy, thyroid symmetric, not enlarged and no tenderness, skin normal     Lungs:   No increased work of breathing, good air exchange, clear to auscultation bilaterally, no crackles or wheezing     Cardiovascular:   Normal apical impulse, regular rate and rhythm, normal S1 and S2, no S3 or S4, and no murmur noted             Lab / Radiology " Results:     Lab Results   Component Value Date    WBC 8.0 10/15/2019    RBC 3.95 10/15/2019    HGB 11.8 10/15/2019    HCT 39.1 10/15/2019    MCV 99 10/15/2019    RDW 14.0 10/15/2019     10/15/2019      Lab Results   Component Value Date    WBC 8.0 10/15/2019     Lab Results   Component Value Date     10/15/2019     Lab Results   Component Value Date    HGB 11.8 10/15/2019    HCT 39.1 10/15/2019     Lab Results   Component Value Date     10/15/2019    CO2 23 10/15/2019    BUN 56 10/15/2019     Lab Results   Component Value Date     10/15/2019    CO2 23 10/15/2019    BUN 56 10/15/2019     Lab Results   Component Value Date     10/15/2019       Lab Results   Component Value Date    BUN 56 10/15/2019     Lab Results   Component Value Date    TSH 1.87 03/24/2016             Plan:   Patient's active problems diagnostically and therapeutically optimized for the planned procedure. Patient here for PPM gen change. Procedure explained in detail to patient including indications, risks, and benefits. She states understanding and wishes to procced.     Hilary Meyers, JI CNP  Electrophysiology Consult Service  Pager: [phone]

## 2019-10-15 NOTE — PROGRESS NOTES
Patient ready for discharge. She has ate, voided, and ambulated without issue. Blood pressure normalized after dose of IV Hydralazine 10mg. PIV removed. Tele notified of patient being off monitor for discharge. This RN went through all discharge instructions with patient and , both verbalized understanding, no questions asked. Patient to leave with all belongings via wheelchair and accompanied by her .

## 2019-10-15 NOTE — PRE-PROCEDURE
GENERAL PRE-PROCEDURE:   Procedure:  PPM generator change  Date/Time:  10/15/2019 1:20 PM    Verbal consent obtained?: Yes    Written consent obtained?: Yes    Risks and benefits: Risks, benefits and alternatives were discussed    Consent given by:  Patient  Patient states understanding of procedure being performed: Yes    Patient's understanding of procedure matches consent: Yes    Procedure consent matches procedure scheduled: Yes    Appropriately NPO:  Yes  ASA Class:  Class 2- mild systemic disease, no acute problems, no functional limitations  Mallampati  :  Grade 2- soft palate, base of uvula, tonsillar pillars, and portion of posterior pharyngeal wall visible  Lungs:  Lungs clear with good breath sounds bilaterally  Heart:  Normal heart sounds and rate  History & Physical reviewed:  History and physical reviewed and no updates needed  Statement of review:  I have reviewed the lab findings, diagnostic data, medications, and the plan for sedation      JI Mixon CNP  Electrophysiology Consult Service  Pager: [phone]

## 2019-10-15 NOTE — PROGRESS NOTES
Patient arrived on 2A for generator change.  Labs completed.  IV placed.  Awaiting consent and current H&P.  Otherwise, prep complete.

## 2019-10-16 LAB
INTERPRETATION ECG - MUSE: NORMAL
INTERPRETATION ECG - MUSE: NORMAL

## 2019-10-21 ENCOUNTER — PATIENT OUTREACH (OUTPATIENT)
Dept: CARDIOLOGY | Facility: CLINIC | Age: 84
End: 2019-10-21

## 2019-10-21 NOTE — PROGRESS NOTES
Attempted post discharge phone call following patient's generator change procedure on 10/15/2019 with Dr. Zhu. Left VM to call back if patient has any questions or concerns regarding discharge instructions, groin site/incision assessment, medications, follow up appts, etc.    Jordyn Campa, JEVONN, RN, PHN  Electrophysiology Nurse Coordinator

## 2019-10-23 LAB
MDC_IDC_LEAD_IMPLANT_DT: NORMAL
MDC_IDC_LEAD_IMPLANT_DT: NORMAL
MDC_IDC_LEAD_LOCATION: NORMAL
MDC_IDC_LEAD_LOCATION: NORMAL
MDC_IDC_LEAD_LOCATION_DETAIL_1: NORMAL
MDC_IDC_LEAD_LOCATION_DETAIL_1: NORMAL
MDC_IDC_LEAD_MFG: NORMAL
MDC_IDC_LEAD_MFG: NORMAL
MDC_IDC_LEAD_MODEL: NORMAL
MDC_IDC_LEAD_MODEL: NORMAL
MDC_IDC_LEAD_POLARITY_TYPE: NORMAL
MDC_IDC_LEAD_POLARITY_TYPE: NORMAL
MDC_IDC_LEAD_SERIAL: NORMAL
MDC_IDC_LEAD_SERIAL: NORMAL
MDC_IDC_MSMT_BATTERY_DTM: NORMAL
MDC_IDC_MSMT_BATTERY_IMPEDANCE: 6244 OHM
MDC_IDC_MSMT_BATTERY_STATUS: NORMAL
MDC_IDC_MSMT_BATTERY_VOLTAGE: 2.61 V
MDC_IDC_MSMT_LEADCHNL_RV_IMPEDANCE_VALUE: 527 OHM
MDC_IDC_PG_IMPLANT_DTM: NORMAL
MDC_IDC_PG_MFG: NORMAL
MDC_IDC_PG_MODEL: NORMAL
MDC_IDC_PG_SERIAL: NORMAL
MDC_IDC_PG_TYPE: NORMAL
MDC_IDC_SESS_CLINIC_NAME: NORMAL
MDC_IDC_SESS_DTM: NORMAL
MDC_IDC_SESS_TYPE: NORMAL
MDC_IDC_SET_BRADY_HYSTRATE: NORMAL
MDC_IDC_SET_BRADY_LOWRATE: 65 {BEATS}/MIN
MDC_IDC_SET_BRADY_MAX_TRACKING_RATE: 105 {BEATS}/MIN
MDC_IDC_SET_BRADY_MODE: NORMAL
MDC_IDC_SET_LEADCHNL_RV_PACING_AMPLITUDE: 2 V
MDC_IDC_SET_LEADCHNL_RV_PACING_ANODE_ELECTRODE_1: NORMAL
MDC_IDC_SET_LEADCHNL_RV_PACING_ANODE_LOCATION_1: NORMAL
MDC_IDC_SET_LEADCHNL_RV_PACING_CAPTURE_MODE: NORMAL
MDC_IDC_SET_LEADCHNL_RV_PACING_CATHODE_ELECTRODE_1: NORMAL
MDC_IDC_SET_LEADCHNL_RV_PACING_CATHODE_LOCATION_1: NORMAL
MDC_IDC_SET_LEADCHNL_RV_PACING_POLARITY: NORMAL
MDC_IDC_SET_LEADCHNL_RV_PACING_PULSEWIDTH: 0.4 MS
MDC_IDC_SET_LEADCHNL_RV_SENSING_ANODE_ELECTRODE_1: NORMAL
MDC_IDC_SET_LEADCHNL_RV_SENSING_ANODE_LOCATION_1: NORMAL
MDC_IDC_SET_LEADCHNL_RV_SENSING_CATHODE_ELECTRODE_1: NORMAL
MDC_IDC_SET_LEADCHNL_RV_SENSING_CATHODE_LOCATION_1: NORMAL
MDC_IDC_SET_LEADCHNL_RV_SENSING_POLARITY: NORMAL
MDC_IDC_SET_LEADCHNL_RV_SENSING_SENSITIVITY: 5.6 MV
MDC_IDC_SET_ZONE_DETECTION_INTERVAL: 333.33 MS
MDC_IDC_SET_ZONE_TYPE: NORMAL
MDC_IDC_SET_ZONE_TYPE: NORMAL
MDC_IDC_STAT_AT_DTM_END: NORMAL
MDC_IDC_STAT_AT_DTM_START: NORMAL
MDC_IDC_STAT_BRADY_DTM_END: NORMAL
MDC_IDC_STAT_BRADY_DTM_START: NORMAL
MDC_IDC_STAT_BRADY_RV_PERCENT_PACED: 0 %
MDC_IDC_STAT_EPISODE_RECENT_COUNT: 0
MDC_IDC_STAT_EPISODE_RECENT_COUNT: 0
MDC_IDC_STAT_EPISODE_RECENT_COUNT_DTM_END: NORMAL
MDC_IDC_STAT_EPISODE_RECENT_COUNT_DTM_END: NORMAL
MDC_IDC_STAT_EPISODE_RECENT_COUNT_DTM_START: NORMAL
MDC_IDC_STAT_EPISODE_RECENT_COUNT_DTM_START: NORMAL
MDC_IDC_STAT_EPISODE_TYPE: NORMAL
MDC_IDC_STAT_EPISODE_TYPE: NORMAL

## 2019-10-24 ENCOUNTER — ANCILLARY PROCEDURE (OUTPATIENT)
Dept: CARDIOLOGY | Facility: CLINIC | Age: 84
End: 2019-10-24
Payer: COMMERCIAL

## 2019-10-24 DIAGNOSIS — I49.5 SICK SINUS SYNDROME (H): ICD-10-CM

## 2019-10-24 LAB
MDC_IDC_LEAD_IMPLANT_DT: NORMAL
MDC_IDC_LEAD_IMPLANT_DT: NORMAL
MDC_IDC_LEAD_LOCATION: NORMAL
MDC_IDC_LEAD_LOCATION: NORMAL
MDC_IDC_LEAD_LOCATION_DETAIL_1: NORMAL
MDC_IDC_LEAD_LOCATION_DETAIL_1: NORMAL
MDC_IDC_LEAD_MFG: NORMAL
MDC_IDC_LEAD_MFG: NORMAL
MDC_IDC_LEAD_MODEL: NORMAL
MDC_IDC_LEAD_MODEL: NORMAL
MDC_IDC_LEAD_POLARITY_TYPE: NORMAL
MDC_IDC_LEAD_POLARITY_TYPE: NORMAL
MDC_IDC_LEAD_SERIAL: NORMAL
MDC_IDC_LEAD_SERIAL: NORMAL
MDC_IDC_MSMT_BATTERY_DTM: NORMAL
MDC_IDC_MSMT_BATTERY_IMPEDANCE: 5852 OHM
MDC_IDC_MSMT_BATTERY_REMAINING_LONGEVITY: 4 MO
MDC_IDC_MSMT_BATTERY_STATUS: NORMAL
MDC_IDC_MSMT_BATTERY_VOLTAGE: 2.59 V
MDC_IDC_MSMT_LEADCHNL_RA_IMPEDANCE_VALUE: 430 OHM
MDC_IDC_MSMT_LEADCHNL_RA_PACING_THRESHOLD_AMPLITUDE: 0.5 V
MDC_IDC_MSMT_LEADCHNL_RA_PACING_THRESHOLD_PULSEWIDTH: 0.4 MS
MDC_IDC_MSMT_LEADCHNL_RV_IMPEDANCE_VALUE: 526 OHM
MDC_IDC_MSMT_LEADCHNL_RV_PACING_THRESHOLD_AMPLITUDE: 0.88 V
MDC_IDC_MSMT_LEADCHNL_RV_PACING_THRESHOLD_PULSEWIDTH: 0.4 MS
MDC_IDC_PG_IMPLANT_DTM: NORMAL
MDC_IDC_PG_MFG: NORMAL
MDC_IDC_PG_MODEL: NORMAL
MDC_IDC_PG_SERIAL: NORMAL
MDC_IDC_PG_TYPE: NORMAL
MDC_IDC_SESS_CLINIC_NAME: NORMAL
MDC_IDC_SESS_DTM: NORMAL
MDC_IDC_SESS_TYPE: NORMAL
MDC_IDC_SET_BRADY_AT_MODE_SWITCH_MODE: NORMAL
MDC_IDC_SET_BRADY_AT_MODE_SWITCH_RATE: 175 {BEATS}/MIN
MDC_IDC_SET_BRADY_LOWRATE: 60 {BEATS}/MIN
MDC_IDC_SET_BRADY_MAX_SENSOR_RATE: 130 {BEATS}/MIN
MDC_IDC_SET_BRADY_MAX_TRACKING_RATE: 130 {BEATS}/MIN
MDC_IDC_SET_BRADY_MODE: NORMAL
MDC_IDC_SET_BRADY_PAV_DELAY_LOW: 150 MS
MDC_IDC_SET_BRADY_SAV_DELAY_LOW: 120 MS
MDC_IDC_SET_LEADCHNL_RA_PACING_AMPLITUDE: 1.5 V
MDC_IDC_SET_LEADCHNL_RA_PACING_ANODE_ELECTRODE_1: NORMAL
MDC_IDC_SET_LEADCHNL_RA_PACING_ANODE_LOCATION_1: NORMAL
MDC_IDC_SET_LEADCHNL_RA_PACING_CAPTURE_MODE: NORMAL
MDC_IDC_SET_LEADCHNL_RA_PACING_CATHODE_ELECTRODE_1: NORMAL
MDC_IDC_SET_LEADCHNL_RA_PACING_CATHODE_LOCATION_1: NORMAL
MDC_IDC_SET_LEADCHNL_RA_PACING_POLARITY: NORMAL
MDC_IDC_SET_LEADCHNL_RA_PACING_PULSEWIDTH: 0.4 MS
MDC_IDC_SET_LEADCHNL_RA_SENSING_ANODE_ELECTRODE_1: NORMAL
MDC_IDC_SET_LEADCHNL_RA_SENSING_ANODE_LOCATION_1: NORMAL
MDC_IDC_SET_LEADCHNL_RA_SENSING_CATHODE_ELECTRODE_1: NORMAL
MDC_IDC_SET_LEADCHNL_RA_SENSING_CATHODE_LOCATION_1: NORMAL
MDC_IDC_SET_LEADCHNL_RA_SENSING_POLARITY: NORMAL
MDC_IDC_SET_LEADCHNL_RA_SENSING_SENSITIVITY: 0.5 MV
MDC_IDC_SET_LEADCHNL_RV_PACING_AMPLITUDE: 2 V
MDC_IDC_SET_LEADCHNL_RV_PACING_ANODE_ELECTRODE_1: NORMAL
MDC_IDC_SET_LEADCHNL_RV_PACING_ANODE_LOCATION_1: NORMAL
MDC_IDC_SET_LEADCHNL_RV_PACING_CAPTURE_MODE: NORMAL
MDC_IDC_SET_LEADCHNL_RV_PACING_CATHODE_ELECTRODE_1: NORMAL
MDC_IDC_SET_LEADCHNL_RV_PACING_CATHODE_LOCATION_1: NORMAL
MDC_IDC_SET_LEADCHNL_RV_PACING_POLARITY: NORMAL
MDC_IDC_SET_LEADCHNL_RV_PACING_PULSEWIDTH: 0.4 MS
MDC_IDC_SET_LEADCHNL_RV_SENSING_ANODE_ELECTRODE_1: NORMAL
MDC_IDC_SET_LEADCHNL_RV_SENSING_ANODE_LOCATION_1: NORMAL
MDC_IDC_SET_LEADCHNL_RV_SENSING_CATHODE_ELECTRODE_1: NORMAL
MDC_IDC_SET_LEADCHNL_RV_SENSING_CATHODE_LOCATION_1: NORMAL
MDC_IDC_SET_LEADCHNL_RV_SENSING_POLARITY: NORMAL
MDC_IDC_SET_LEADCHNL_RV_SENSING_SENSITIVITY: 4 MV
MDC_IDC_SET_ZONE_DETECTION_INTERVAL: 333.33 MS
MDC_IDC_SET_ZONE_DETECTION_INTERVAL: 342.86 MS
MDC_IDC_SET_ZONE_TYPE: NORMAL
MDC_IDC_SET_ZONE_TYPE: NORMAL
MDC_IDC_STAT_AT_BURDEN_PERCENT: 0 %
MDC_IDC_STAT_AT_DTM_END: NORMAL
MDC_IDC_STAT_AT_DTM_START: NORMAL
MDC_IDC_STAT_AT_MODE_SW_COUNT: 33
MDC_IDC_STAT_BRADY_AP_VP_PERCENT: 0 %
MDC_IDC_STAT_BRADY_AP_VS_PERCENT: 88 %
MDC_IDC_STAT_BRADY_AS_VP_PERCENT: 0 %
MDC_IDC_STAT_BRADY_AS_VS_PERCENT: 12 %
MDC_IDC_STAT_BRADY_DTM_END: NORMAL
MDC_IDC_STAT_BRADY_DTM_START: NORMAL
MDC_IDC_STAT_EPISODE_RECENT_COUNT: 0
MDC_IDC_STAT_EPISODE_RECENT_COUNT: 2
MDC_IDC_STAT_EPISODE_RECENT_COUNT_DTM_END: NORMAL
MDC_IDC_STAT_EPISODE_RECENT_COUNT_DTM_END: NORMAL
MDC_IDC_STAT_EPISODE_RECENT_COUNT_DTM_START: NORMAL
MDC_IDC_STAT_EPISODE_RECENT_COUNT_DTM_START: NORMAL
MDC_IDC_STAT_EPISODE_TYPE: NORMAL
MDC_IDC_STAT_EPISODE_TYPE: NORMAL

## 2019-10-24 NOTE — PATIENT INSTRUCTIONS
It was a pleasure to see you in clinic today. Please do not hesitate to call with any questions or concerns. We look forward to seeing you in clinic at your next device check on 11/19/19.    Ruby Mann RN  Electrophysiology Nurse Clinician  [facility]  During business hours call:  [phone]  After business hours please call: [phone]- select option #4 and ask for job code [phone].

## 2019-10-27 LAB
MDC_IDC_LEAD_IMPLANT_DT: NORMAL
MDC_IDC_LEAD_IMPLANT_DT: NORMAL
MDC_IDC_LEAD_LOCATION: NORMAL
MDC_IDC_LEAD_LOCATION: NORMAL
MDC_IDC_LEAD_LOCATION_DETAIL_1: NORMAL
MDC_IDC_LEAD_LOCATION_DETAIL_1: NORMAL
MDC_IDC_LEAD_MFG: NORMAL
MDC_IDC_LEAD_MFG: NORMAL
MDC_IDC_LEAD_MODEL: NORMAL
MDC_IDC_LEAD_MODEL: NORMAL
MDC_IDC_LEAD_POLARITY_TYPE: NORMAL
MDC_IDC_LEAD_POLARITY_TYPE: NORMAL
MDC_IDC_LEAD_SERIAL: NORMAL
MDC_IDC_LEAD_SERIAL: NORMAL
MDC_IDC_LEAD_SPECIAL_FUNCTION: NORMAL
MDC_IDC_LEAD_SPECIAL_FUNCTION: NORMAL
MDC_IDC_MSMT_BATTERY_DTM: NORMAL
MDC_IDC_MSMT_BATTERY_REMAINING_LONGEVITY: 163 MO
MDC_IDC_MSMT_BATTERY_RRT_TRIGGER: 2.62
MDC_IDC_MSMT_BATTERY_STATUS: NORMAL
MDC_IDC_MSMT_BATTERY_VOLTAGE: 3.22 V
MDC_IDC_MSMT_LEADCHNL_RA_IMPEDANCE_VALUE: 342 OHM
MDC_IDC_MSMT_LEADCHNL_RA_IMPEDANCE_VALUE: 399 OHM
MDC_IDC_MSMT_LEADCHNL_RA_PACING_THRESHOLD_AMPLITUDE: 0.5 V
MDC_IDC_MSMT_LEADCHNL_RA_PACING_THRESHOLD_AMPLITUDE: 0.62 V
MDC_IDC_MSMT_LEADCHNL_RA_PACING_THRESHOLD_PULSEWIDTH: 0.4 MS
MDC_IDC_MSMT_LEADCHNL_RA_PACING_THRESHOLD_PULSEWIDTH: 0.4 MS
MDC_IDC_MSMT_LEADCHNL_RA_SENSING_INTR_AMPL: 2.5 MV
MDC_IDC_MSMT_LEADCHNL_RA_SENSING_INTR_AMPL: 3.5 MV
MDC_IDC_MSMT_LEADCHNL_RV_IMPEDANCE_VALUE: 399 OHM
MDC_IDC_MSMT_LEADCHNL_RV_IMPEDANCE_VALUE: 456 OHM
MDC_IDC_MSMT_LEADCHNL_RV_PACING_THRESHOLD_AMPLITUDE: 1 V
MDC_IDC_MSMT_LEADCHNL_RV_PACING_THRESHOLD_AMPLITUDE: 1 V
MDC_IDC_MSMT_LEADCHNL_RV_PACING_THRESHOLD_PULSEWIDTH: 0.4 MS
MDC_IDC_MSMT_LEADCHNL_RV_PACING_THRESHOLD_PULSEWIDTH: 0.4 MS
MDC_IDC_MSMT_LEADCHNL_RV_SENSING_INTR_AMPL: 10.75 MV
MDC_IDC_MSMT_LEADCHNL_RV_SENSING_INTR_AMPL: 10.88 MV
MDC_IDC_PG_IMPLANT_DTM: NORMAL
MDC_IDC_PG_MFG: NORMAL
MDC_IDC_PG_MODEL: NORMAL
MDC_IDC_PG_SERIAL: NORMAL
MDC_IDC_PG_TYPE: NORMAL
MDC_IDC_SESS_CLINIC_NAME: NORMAL
MDC_IDC_SESS_DTM: NORMAL
MDC_IDC_SESS_TYPE: NORMAL
MDC_IDC_SET_BRADY_AT_MODE_SWITCH_RATE: 171 {BEATS}/MIN
MDC_IDC_SET_BRADY_HYSTRATE: NORMAL
MDC_IDC_SET_BRADY_LOWRATE: 60 {BEATS}/MIN
MDC_IDC_SET_BRADY_MAX_SENSOR_RATE: 130 {BEATS}/MIN
MDC_IDC_SET_BRADY_MAX_TRACKING_RATE: 130 {BEATS}/MIN
MDC_IDC_SET_BRADY_MODE: NORMAL
MDC_IDC_SET_BRADY_PAV_DELAY_LOW: 180 MS
MDC_IDC_SET_BRADY_SAV_DELAY_LOW: 150 MS
MDC_IDC_SET_LEADCHNL_RA_PACING_AMPLITUDE: 1.5 V
MDC_IDC_SET_LEADCHNL_RA_PACING_ANODE_ELECTRODE_1: NORMAL
MDC_IDC_SET_LEADCHNL_RA_PACING_ANODE_LOCATION_1: NORMAL
MDC_IDC_SET_LEADCHNL_RA_PACING_CAPTURE_MODE: NORMAL
MDC_IDC_SET_LEADCHNL_RA_PACING_CATHODE_ELECTRODE_1: NORMAL
MDC_IDC_SET_LEADCHNL_RA_PACING_CATHODE_LOCATION_1: NORMAL
MDC_IDC_SET_LEADCHNL_RA_PACING_POLARITY: NORMAL
MDC_IDC_SET_LEADCHNL_RA_PACING_PULSEWIDTH: 0.4 MS
MDC_IDC_SET_LEADCHNL_RA_SENSING_ANODE_ELECTRODE_1: NORMAL
MDC_IDC_SET_LEADCHNL_RA_SENSING_ANODE_LOCATION_1: NORMAL
MDC_IDC_SET_LEADCHNL_RA_SENSING_CATHODE_ELECTRODE_1: NORMAL
MDC_IDC_SET_LEADCHNL_RA_SENSING_CATHODE_LOCATION_1: NORMAL
MDC_IDC_SET_LEADCHNL_RA_SENSING_POLARITY: NORMAL
MDC_IDC_SET_LEADCHNL_RA_SENSING_SENSITIVITY: 0.3 MV
MDC_IDC_SET_LEADCHNL_RV_PACING_AMPLITUDE: 2 V
MDC_IDC_SET_LEADCHNL_RV_PACING_ANODE_ELECTRODE_1: NORMAL
MDC_IDC_SET_LEADCHNL_RV_PACING_ANODE_LOCATION_1: NORMAL
MDC_IDC_SET_LEADCHNL_RV_PACING_CAPTURE_MODE: NORMAL
MDC_IDC_SET_LEADCHNL_RV_PACING_CATHODE_ELECTRODE_1: NORMAL
MDC_IDC_SET_LEADCHNL_RV_PACING_CATHODE_LOCATION_1: NORMAL
MDC_IDC_SET_LEADCHNL_RV_PACING_POLARITY: NORMAL
MDC_IDC_SET_LEADCHNL_RV_PACING_PULSEWIDTH: 0.4 MS
MDC_IDC_SET_LEADCHNL_RV_SENSING_ANODE_ELECTRODE_1: NORMAL
MDC_IDC_SET_LEADCHNL_RV_SENSING_ANODE_LOCATION_1: NORMAL
MDC_IDC_SET_LEADCHNL_RV_SENSING_CATHODE_ELECTRODE_1: NORMAL
MDC_IDC_SET_LEADCHNL_RV_SENSING_CATHODE_LOCATION_1: NORMAL
MDC_IDC_SET_LEADCHNL_RV_SENSING_POLARITY: NORMAL
MDC_IDC_SET_LEADCHNL_RV_SENSING_SENSITIVITY: 0.9 MV
MDC_IDC_SET_ZONE_DETECTION_INTERVAL: 350 MS
MDC_IDC_SET_ZONE_DETECTION_INTERVAL: 400 MS
MDC_IDC_SET_ZONE_TYPE: NORMAL
MDC_IDC_STAT_BRADY_AP_VP_PERCENT: 0.03 %
MDC_IDC_STAT_BRADY_AP_VS_PERCENT: 92.44 %
MDC_IDC_STAT_BRADY_AS_VP_PERCENT: 0.01 %
MDC_IDC_STAT_BRADY_AS_VS_PERCENT: 7.52 %
MDC_IDC_STAT_BRADY_DTM_END: NORMAL
MDC_IDC_STAT_BRADY_DTM_START: NORMAL
MDC_IDC_STAT_BRADY_RA_PERCENT_PACED: 92.48 %
MDC_IDC_STAT_BRADY_RV_PERCENT_PACED: 0.04 %
MDC_IDC_STAT_EPISODE_RECENT_COUNT: 0
MDC_IDC_STAT_EPISODE_RECENT_COUNT_DTM_END: NORMAL
MDC_IDC_STAT_EPISODE_RECENT_COUNT_DTM_START: NORMAL
MDC_IDC_STAT_EPISODE_TOTAL_COUNT: 0
MDC_IDC_STAT_EPISODE_TOTAL_COUNT_DTM_END: NORMAL
MDC_IDC_STAT_EPISODE_TOTAL_COUNT_DTM_START: NORMAL
MDC_IDC_STAT_EPISODE_TYPE: NORMAL

## 2019-11-08 DIAGNOSIS — I25.10 CORONARY ARTERY DISEASE INVOLVING NATIVE CORONARY ARTERY OF NATIVE HEART WITHOUT ANGINA PECTORIS: ICD-10-CM

## 2019-11-08 DIAGNOSIS — R07.9 CHEST PAIN, UNSPECIFIED TYPE: ICD-10-CM

## 2019-11-08 RX ORDER — ISOSORBIDE MONONITRATE 20 MG/1
40 TABLET ORAL 3 TIMES DAILY
Qty: 540 TABLET | Refills: 0 | Status: SHIPPED | OUTPATIENT
Start: 2019-11-08 | End: 2020-01-06 | Stop reason: DRUGHIGH

## 2019-11-16 DIAGNOSIS — E87.6 HYPOKALEMIA: ICD-10-CM

## 2019-11-16 DIAGNOSIS — R07.9 CHEST PAIN, UNSPECIFIED TYPE: ICD-10-CM

## 2019-11-16 DIAGNOSIS — I25.10 CORONARY ARTERY DISEASE INVOLVING NATIVE CORONARY ARTERY OF NATIVE HEART WITHOUT ANGINA PECTORIS: ICD-10-CM

## 2019-11-18 RX ORDER — POTASSIUM CHLORIDE 750 MG/1
TABLET, EXTENDED RELEASE ORAL
Qty: 270 TABLET | Refills: 3 | Status: SHIPPED | OUTPATIENT
Start: 2019-11-18 | End: 2020-01-16

## 2019-11-18 NOTE — TELEPHONE ENCOUNTER
ISOSORB MONO 20MG   TAB  Last Written Prescription Date:  11/8/2019  Last Fill Quantity: 540,   # refills: 0  Last Office Visit : 6/24/2019  Future Office visit:  12/17/2019    Routing refill request to provider for review/approval because:  Blood pressure out of range   10/15/19 (!) 149/61     Continue with the same dose of medication for Pt care??    Blood Pressure check?    Office Visit 12/17/2019.    Sending to clinic for review by Provider.    Uma Pool RN  Central Triage Red Flags/Med Refills

## 2019-11-19 RX ORDER — ISOSORBIDE MONONITRATE 20 MG/1
40 TABLET ORAL 3 TIMES DAILY
Qty: 540 TABLET | Refills: 0 | Status: SHIPPED | OUTPATIENT
Start: 2019-11-19 | End: 2020-01-06

## 2019-12-10 ENCOUNTER — OFFICE VISIT (OUTPATIENT)
Dept: CARDIOLOGY | Facility: CLINIC | Age: 84
End: 2019-12-10
Attending: INTERNAL MEDICINE
Payer: COMMERCIAL

## 2019-12-10 VITALS
OXYGEN SATURATION: 100 % | DIASTOLIC BLOOD PRESSURE: 92 MMHG | HEART RATE: 76 BPM | HEIGHT: 67 IN | BODY MASS INDEX: 25.06 KG/M2 | SYSTOLIC BLOOD PRESSURE: 150 MMHG

## 2019-12-10 DIAGNOSIS — I49.5 SICK SINUS SYNDROME (H): ICD-10-CM

## 2019-12-10 DIAGNOSIS — I36.1 NON-RHEUMATIC TRICUSPID VALVE INSUFFICIENCY: ICD-10-CM

## 2019-12-10 DIAGNOSIS — I12.9 RENAL HYPERTENSION: ICD-10-CM

## 2019-12-10 DIAGNOSIS — I49.5 SSS (SICK SINUS SYNDROME) (H): ICD-10-CM

## 2019-12-10 DIAGNOSIS — I25.10 CORONARY ARTERY DISEASE INVOLVING NATIVE CORONARY ARTERY OF NATIVE HEART WITHOUT ANGINA PECTORIS: Primary | ICD-10-CM

## 2019-12-10 DIAGNOSIS — K21.9 GASTROESOPHAGEAL REFLUX DISEASE, ESOPHAGITIS PRESENCE NOT SPECIFIED: ICD-10-CM

## 2019-12-10 DIAGNOSIS — I34.0 NON-RHEUMATIC MITRAL REGURGITATION: ICD-10-CM

## 2019-12-10 DIAGNOSIS — Z95.0 CARDIAC PACEMAKER IN SITU: ICD-10-CM

## 2019-12-10 PROCEDURE — 99214 OFFICE O/P EST MOD 30 MIN: CPT | Mod: ZP | Performed by: INTERNAL MEDICINE

## 2019-12-10 PROCEDURE — G0463 HOSPITAL OUTPT CLINIC VISIT: HCPCS | Mod: 25,ZF

## 2019-12-10 RX ORDER — OMEPRAZOLE 40 MG/1
CAPSULE, DELAYED RELEASE ORAL
Qty: 90 CAPSULE | Refills: 3 | Status: SHIPPED | OUTPATIENT
Start: 2019-12-10 | End: 2020-12-11

## 2019-12-10 ASSESSMENT — PAIN SCALES - GENERAL: PAINLEVEL: NO PAIN (0)

## 2019-12-10 NOTE — NURSING NOTE
Chief Complaint   Patient presents with     Follow Up     present for 1 year follow up with device check prior     Vitals were taken and medications were reconciled.     Tiffanie Bustos CMA    3:02 PM

## 2019-12-10 NOTE — LETTER
12/10/2019    RE: Tessa Mansfield  [address]  [place] [address]     Dear Colleague,    Thank you for the opportunity to participate in the care of your patient, Tessa Mansfield, at the [facility]. Please see a copy of my visit note below.    Chief complaint: CAD, establish follow up    HPI:   Tessa Mansfield is a 84 year old female former patient of Dr. Mir with extensive CAD history as detailed below (PCI to all 3 vessels at various times from 3666-4854, last PCA to RPDA 7/23/13 and known 80% ISR of small LCX) on indefinite dual antiplatelet therapy and sick sinus syndrome s/p dual-chamber PPM who presents for follow up of her chronic cardiovascular conditions.     She underwent pacemaker generator change 10/15/19 due to her prior generator reaching LOLA (new device: Medtronic [id number] [place] XT DR MRI dual chamber PPM) without event.     TTE 10/15/19 (my read):   Normal LV/RV function, LVEF=60-65%.   At least moderate MR and moderate-to-severe TR.  RVSP~39+RAP~15 mmHg.  Compared to 7/24/13: MR and TR have worsened.     She reports volume status has been about stable and that she has self-titrated furosemide as needed and has done so for some time.     She denies any chest pain, dyspnea at rest or with exertion, PND, orthopnea, peripheral edema, palpitations, lightheadedness or syncope.     Summary of CAD history:  1.  10/27/2002: AMI s/p PCI+BMS (3.5x18mm Bx velocity) to mRCA  2. 2/5/2003: Back pain; PCI+stent to mLCX c/b distal embolization/slow flow  3. 4/11/2003: Unstable angina; PCI+stent (Hepacoat velocity) to mLAD  4. 11/27/2007: PCI+DESx2 to pLAD (IVUS w/ calcification of LMCA and ulcerated plaque pLAD, 80% dRCA; also had 80% dLCX, too small to stent)  5. 12/11/2007: Staged PCI. PCI+DESx1 (3.5x13mm Cypher) to dRCA (indefinite DAPT recommended at this time)  6. 6/27/2008: Angina. mLCX stent 70% ISR. LAD and RCA stents patent. Myocardium  at risk from LCX felt to be small, medical management preferred to PCI.  7. 11/10/2009: Angina. Findings unchanged from 6/27/2008, FFR LAD 0.90. Medical management recommended.  8. 7/23/2013: Unstable angina; PCI+ALVAREZ to mPDA. Diagnostic findings: LMCA 40% distal. LAD: pLAD stents patent mLAD 30% ISR dLAD 50% diffuse, D2 diffuse disease. LCX 80% mid ISR. RCA diffuse <30% mid, RPLAs diffuse disease, RPDA 100% occlusion.     PAST MEDICAL HISTORY:  Past Medical History:   Diagnosis Date     CAD (coronary artery disease)      CAD s/p PCI+BMS to MRCA 10/2002, PCI to mLCX 2/2003, PCI+DESx2 to pLAD 11/2007, PCI+DESx1 to dRCA 12/2007, PCI+ALVAREZ to RPDA 7/2013; on indefinite DAPT  10/27/2002    10/27/2002: AMI s/p PCI+BMS (3.5x18mm Bx velocity) to mRCA 2/5/2003: Back pain; PCI+stent to mLCX c/b distal embolization/slow flow 4/11/2003: Unstable angina; PCI+stent (Hepacoat velocity) to mLAD 11/27/2007: PCI+DESx2 to pLAD (IVUS w/ calcification of LMCA and ulcerated plaque pLAD, 80% dRCA; also had 80% dLCX, too small to stent) 12/11/2007: Staged PCI. PCI+DESx1 (3.5x13mm Cypher) to dRCA (indefinite DAPT recommended at this time) 6/27/2008: Angina. mLCX stent 70% ISR. LAD and RCA stents patent. Myocardium at risk from LCX felt to be small, medical management preferred to PCI. 11/10/2009: Angina. Findings unchanged from 6/27/2008, FFR LAD 0.90. Medical management recommended. 7/23/2013: Unstable angina; PCI+ALVAREZ to mPDA. Diagnostic findings: LMCA 40% distal. LAD: pLAD stents patent mLAD 30% ISR dLAD 50% diffuse, D2 diffuse disease. LCX 80% mid ISR. RCA diffuse <30% mid, RPLAs diffuse disease, RPDA 100% occlusion.       Cardiac Pacemaker- Medtronic, dual chamber- NOT dependant 11/28/2007     CKD (chronic kidney disease), stage IV (H)      Hyperlipidemia LDL goal <70      Hypertension      Stented coronary artery 10-    RCA     Stented coronary artery 2-5-2003    LCx     Stented coronary artery 4-    LAD     Stented  coronary artery 11-    LAD     Stented coronary artery 12-    RCA     Transient complete heart block (H) 11/28/2007     CURRENT MEDICATIONS:  Current Outpatient Medications   Medication Sig Dispense Refill     allopurinol (ZYLOPRIM) 100 MG tablet Take 1 tablet (100 mg) by mouth daily 90 tablet 3     aspirin 81 MG tablet Take 1 tablet by mouth daily.       clopidogrel (PLAVIX) 75 MG tablet TAKE 1 TABLET BY MOUTH ONCE DAILY 90 tablet 1     cyanocolbalamin (VITAMIN B-12) 1000 MCG tablet Take 500 mcg by mouth daily As directed       furosemide (LASIX) 40 MG tablet Take 1 tablet (40 mg) by mouth daily 30 tablet 11     isosorbide mononitrate (ISMO/MONOKET) 20 MG tablet Take 2 tablets (40 mg) by mouth 3 times daily 540 tablet 0     isosorbide mononitrate (ISMO/MONOKET) 20 MG tablet Take 2 tablets (40 mg) by mouth 3 times daily 540 tablet 0     metoprolol tartrate (LOPRESSOR) 100 MG tablet Take 1 tablet (100 mg) by mouth 2 times daily 180 tablet 3     multivitamin  with lutein (OCUVITE WITH LTEIN) CAPS Take 1 capsule by mouth daily       NITROSTAT 0.4 MG sublingual tablet DISSOLVE ONE TABLET UNDER THE TONGUE EVERY 5 MINUTES AS NEEDED FOR CHEST PAIN.  DO NOT EXCEED A TOTAL OF 3 DOSES IN 15 MINUTES. CALL 911. 25 tablet 3     omeprazole (PRILOSEC) 40 MG DR capsule TAKE ONE CAPSULE BY MOUTH ONCE DAILY 90 capsule 3     order for DME Equipment being ordered: Left hand and wrist brace 1 each 0     potassium chloride ER (K-TAB/KLOR-CON) 10 MEQ CR tablet TAKE TWO TABLETS BY MOUTH DAILY IN THE MORNING AND ONE TABLET IN THE EVENING 270 tablet 3     pravastatin (PRAVACHOL) 80 MG tablet TAKE 1 TABLET BY MOUTH ONCE DAILY AT BEDTIME 90 tablet 2     timolol (TIMOPTIC) 0.5 % ophthalmic solution Place 1 drop into both eyes daily        amLODIPine (NORVASC) 2.5 MG tablet Take 1 tablet (2.5 mg) by mouth daily (Patient not taking: Reported on 12/10/2019) 30 tablet 11       PAST SURGICAL HISTORY:  Past Surgical History:   Procedure  "Laterality Date     CHOLECYSTECTOMY       EP PACEMAKER N/A 10/15/2019    Procedure: EP PACEMAKER;  Surgeon: Anthony Zhu MD;  Location:  HEART CARDIAC CATH LAB     HC PPM INSERTION NEW/REPLACEMENT W/ ATRIAL&VENTRICULAR LEAD  11-     HYSTERECTOMY         ALLERGIES:   Allergies   Allergen Reactions     Bactrim [Sulfamethoxazole W/Trimethoprim] Other (See Comments)     Patient unable to recall     Biaxin [Clarithromycin]      Chlorthalidone Nausea and Vomiting     Clonidine      Codeine Sulfate Itching     Darvon [Propoxyphene Hcl]      Dilaudid [Hydromorphone] Visual Disturbance     Hallucinations       Gabapentin Other (See Comments) and Fatigue     \"felt drunk\"     Indocin      Levaquin [Levofloxacin Hemihydrate]      Lyrica Fatigue     Morphine Sulfate      Percocet [Oxycodone-Acetaminophen] Other (See Comments)     hallucinations     Pregabalin Itching     Other reaction(s): Fatigue     Shrimp Swelling     Spironolactone Other (See Comments)     Dehydrated       Terazosin      Ciprofloxacin Rash     Simvastatin Palpitations     Muscle weakness, leg cramping       FAMILY HISTORY:  Family History   Problem Relation Age of Onset     Cancer Sister 82        bladder cancer     No family history of premature CAD or sudden death.    SOCIAL HISTORY:  Social History     Tobacco Use     Smoking status: Former Smoker     Packs/day: 0.30     Years: 15.00     Pack years: 4.50     Types: Cigarettes     Smokeless tobacco: Never Used     Tobacco comment: Quit 22+ years ago   Substance Use Topics     Alcohol use: None     Drug use: No       ROS:   A comprehensive 14 point review of systems is negative other than as mentioned in HPI.    Exam:  BP (!) 150/92 (BP Location: Right arm, Patient Position: Sitting, Cuff Size: Adult Regular)   Pulse 76   Ht 1.702 m (5' 7\")   SpO2 100%   BMI 25.06 kg/m     GENERAL APPEARANCE: healthy, alert and no distress  EYES: no icterus, no xanthelasmas  ENT: normal palate, mucosa " moist, no central cyanosis  NECK: no adenopathy, no asymmetry, masses, or scars, thyroid normal to palpation and no bruits, JVP not elevated  RESPIRATORY: lungs clear to auscultation - no rales, rhonchi or wheezes, no use of accessory muscles, no retractions, respirations are unlabored, normal respiratory rate  CARDIOVASCULAR: regular rhythm, normal S1 with physiologic split S2, no S3 or S4 and no murmur, click or rub, precordium quiet with normal PMI.  GI: soft, non tender, without hepatosplenomegaly, no masses palpable, bowel sounds normal, aorta not enlarged by palpation, no abdominal bruits  EXTREMITIES: peripheral pulses normal, no edema, no bruits  NEURO: alert and oriented to person/place/time, normal speech, gait and affect  VASC: Radial, dorsalis pedis and posterior tibialis pulses 2+ bilaterally.  SKIN: no ecchymoses, no rashes.  PSYCH: cooperative, affect appropriate.   Labs:  Reviewed.     Testing/Procedures:  I personally visualized her prior coronary angiograms from 5983-3616, results as per HPI.  Reviewed device check today, normal function, 84% A-paced 0.1% V-paced, no arrhythmias detected.     TTE 10/15/19 (my read):   Normal LV/RV function, LVEF=60-65%.   At least moderate MR and moderate-to-severe TR.  RVSP~39+RAP~15 mmHg.  Compared to 7/24/13: MR and TR have worsened.     Assessment and Plan:   1. CAD s/p multiple PCIs as above (last to RPDA 7/23/13) and known 80% ISR of small mLCX, without angina:   -continue DAPT indefinitely  -continue statin    2. Moderate mitral regurgitation  3. Moderate-to-severe tricuspid regurgitation  -no clinical stigmata of significant MR or TR and stable functional capacity, follow clinically  -repeat TTE next year    4. Renal Hypertension  -repeat BP improved (150/92) albeit still somewhat elevated, followed by Odilia Martin and Scar    5. Sick sinus syndrome s/p dual chamber PPM:  Followed by Dr. Edwards. Device check today shows normal function.     The  patient states understanding and is agreeable with plan.     Tyrell Santoyo MD  Cardiology    CC  ESTABLISHED PATIENT

## 2019-12-10 NOTE — PATIENT INSTRUCTIONS
It was a pleasure to see you in clinic today.  Please do not hesitate to call with any questions or concerns.  We look forward to seeing you in clinic at your next device check on 1/22/20 at 10 am, prior to seeing RITCHIE Mixon.    Escobar Hines, RN  Electrophysiology Nurse Clinician  [facility]    During Business Hours Please Call:  [phone]  After Hours Please Call:  [phone] - select option #4 and ask for job code [phone]

## 2019-12-10 NOTE — PATIENT INSTRUCTIONS
You were seen at the [facility] clinic today.  You saw Dr. Tyrell Santoyo  Here are your Instructions:    1. Follow up with Dr. Santoyo in 1 year with echocardiogram prior.        If you have questions after this visit:  Send a Soum message or contact Subhash Mason LPN  Office:  [phone] option #1, then #3 & ask for Subhash (nurse line)  Fax:  [phone]  After Hours:  [phone] option #4 ask to speak to he on-call Cardiologist  Appointments:  [phone] option #1, then option #1

## 2019-12-10 NOTE — PROGRESS NOTES
Chief complaint: CAD, establish follow up    HPI:   Tessa Mansfield is a 84 year old female former patient of Dr. Mir with extensive CAD history as detailed below (PCI to all 3 vessels at various times from 1301-6828, last PCA to RPDA 7/23/13 and known 80% ISR of small LCX) on indefinite dual antiplatelet therapy and sick sinus syndrome s/p dual-chamber PPM who presents for follow up of her chronic cardiovascular conditions.     She underwent pacemaker generator change 10/15/19 due to her prior generator reaching LOLA (new device: Medtronic [id number] [place] XT DR MRI dual chamber PPM) without event.     TTE 10/15/19 (my read):   Normal LV/RV function, LVEF=60-65%.   At least moderate MR and moderate-to-severe TR.  RVSP~39+RAP~15 mmHg.  Compared to 7/24/13: MR and TR have worsened.     She reports volume status has been about stable and that she has self-titrated furosemide as needed and has done so for some time.     She denies any chest pain, dyspnea at rest or with exertion, PND, orthopnea, peripheral edema, palpitations, lightheadedness or syncope.     Summary of CAD history:  1.  10/27/2002: AMI s/p PCI+BMS (3.5x18mm Bx velocity) to mRCA  2. 2/5/2003: Back pain; PCI+stent to mLCX c/b distal embolization/slow flow  3. 4/11/2003: Unstable angina; PCI+stent (Hepacoat velocity) to mLAD  4. 11/27/2007: PCI+DESx2 to pLAD (IVUS w/ calcification of LMCA and ulcerated plaque pLAD, 80% dRCA; also had 80% dLCX, too small to stent)  5. 12/11/2007: Staged PCI. PCI+DESx1 (3.5x13mm Cypher) to dRCA (indefinite DAPT recommended at this time)  6. 6/27/2008: Angina. mLCX stent 70% ISR. LAD and RCA stents patent. Myocardium at risk from LCX felt to be small, medical management preferred to PCI.  7. 11/10/2009: Angina. Findings unchanged from 6/27/2008, FFR LAD 0.90. Medical management recommended.  8. 7/23/2013: Unstable angina; PCI+ALVAREZ to mPDA. Diagnostic findings: LMCA 40% distal. LAD: pLAD stents patent mLAD 30% ISR dLAD 50% diffuse,  D2 diffuse disease. LCX 80% mid ISR. RCA diffuse <30% mid, RPLAs diffuse disease, RPDA 100% occlusion.         PAST MEDICAL HISTORY:  Past Medical History:   Diagnosis Date     CAD (coronary artery disease)      CAD s/p PCI+BMS to MRCA 10/2002, PCI to mLCX 2/2003, PCI+DESx2 to pLAD 11/2007, PCI+DESx1 to dRCA 12/2007, PCI+ALVAREZ to RPDA 7/2013; on indefinite DAPT  10/27/2002    10/27/2002: AMI s/p PCI+BMS (3.5x18mm Bx velocity) to mRCA 2/5/2003: Back pain; PCI+stent to mLCX c/b distal embolization/slow flow 4/11/2003: Unstable angina; PCI+stent (Hepacoat velocity) to mLAD 11/27/2007: PCI+DESx2 to pLAD (IVUS w/ calcification of LMCA and ulcerated plaque pLAD, 80% dRCA; also had 80% dLCX, too small to stent) 12/11/2007: Staged PCI. PCI+DESx1 (3.5x13mm Cypher) to dRCA (indefinite DAPT recommended at this time) 6/27/2008: Angina. mLCX stent 70% ISR. LAD and RCA stents patent. Myocardium at risk from LCX felt to be small, medical management preferred to PCI. 11/10/2009: Angina. Findings unchanged from 6/27/2008, FFR LAD 0.90. Medical management recommended. 7/23/2013: Unstable angina; PCI+ALVAREZ to mPDA. Diagnostic findings: LMCA 40% distal. LAD: pLAD stents patent mLAD 30% ISR dLAD 50% diffuse, D2 diffuse disease. LCX 80% mid ISR. RCA diffuse <30% mid, RPLAs diffuse disease, RPDA 100% occlusion.       Cardiac Pacemaker- Medtronic, dual chamber- NOT dependant 11/28/2007     CKD (chronic kidney disease), stage IV (H)      Hyperlipidemia LDL goal <70      Hypertension      Stented coronary artery 10-    RCA     Stented coronary artery 2-5-2003    LCx     Stented coronary artery 4-    LAD     Stented coronary artery 11-    LAD     Stented coronary artery 12-    RCA     Transient complete heart block (H) 11/28/2007       CURRENT MEDICATIONS:  Current Outpatient Medications   Medication Sig Dispense Refill     allopurinol (ZYLOPRIM) 100 MG tablet Take 1 tablet (100 mg) by mouth daily 90 tablet 3      aspirin 81 MG tablet Take 1 tablet by mouth daily.       clopidogrel (PLAVIX) 75 MG tablet TAKE 1 TABLET BY MOUTH ONCE DAILY 90 tablet 1     cyanocolbalamin (VITAMIN B-12) 1000 MCG tablet Take 500 mcg by mouth daily As directed       furosemide (LASIX) 40 MG tablet Take 1 tablet (40 mg) by mouth daily 30 tablet 11     isosorbide mononitrate (ISMO/MONOKET) 20 MG tablet Take 2 tablets (40 mg) by mouth 3 times daily 540 tablet 0     isosorbide mononitrate (ISMO/MONOKET) 20 MG tablet Take 2 tablets (40 mg) by mouth 3 times daily 540 tablet 0     metoprolol tartrate (LOPRESSOR) 100 MG tablet Take 1 tablet (100 mg) by mouth 2 times daily 180 tablet 3     multivitamin  with lutein (OCUVITE WITH LTEIN) CAPS Take 1 capsule by mouth daily       NITROSTAT 0.4 MG sublingual tablet DISSOLVE ONE TABLET UNDER THE TONGUE EVERY 5 MINUTES AS NEEDED FOR CHEST PAIN.  DO NOT EXCEED A TOTAL OF 3 DOSES IN 15 MINUTES. CALL 911. 25 tablet 3     omeprazole (PRILOSEC) 40 MG DR capsule TAKE ONE CAPSULE BY MOUTH ONCE DAILY 90 capsule 3     order for DME Equipment being ordered: Left hand and wrist brace 1 each 0     potassium chloride ER (K-TAB/KLOR-CON) 10 MEQ CR tablet TAKE TWO TABLETS BY MOUTH DAILY IN THE MORNING AND ONE TABLET IN THE EVENING 270 tablet 3     pravastatin (PRAVACHOL) 80 MG tablet TAKE 1 TABLET BY MOUTH ONCE DAILY AT BEDTIME 90 tablet 2     timolol (TIMOPTIC) 0.5 % ophthalmic solution Place 1 drop into both eyes daily        amLODIPine (NORVASC) 2.5 MG tablet Take 1 tablet (2.5 mg) by mouth daily (Patient not taking: Reported on 12/10/2019) 30 tablet 11       PAST SURGICAL HISTORY:  Past Surgical History:   Procedure Laterality Date     CHOLECYSTECTOMY       EP PACEMAKER N/A 10/15/2019    Procedure: EP PACEMAKER;  Surgeon: Anthony Zhu MD;  Location:  HEART CARDIAC CATH LAB     HC PPM INSERTION NEW/REPLACEMENT W/ ATRIAL&VENTRICULAR LEAD  11-     HYSTERECTOMY         ALLERGIES:     Allergies   Allergen  "Reactions     Bactrim [Sulfamethoxazole W/Trimethoprim] Other (See Comments)     Patient unable to recall     Biaxin [Clarithromycin]      Chlorthalidone Nausea and Vomiting     Clonidine      Codeine Sulfate Itching     Darvon [Propoxyphene Hcl]      Dilaudid [Hydromorphone] Visual Disturbance     Hallucinations       Gabapentin Other (See Comments) and Fatigue     \"felt drunk\"     Indocin      Levaquin [Levofloxacin Hemihydrate]      Lyrica Fatigue     Morphine Sulfate      Percocet [Oxycodone-Acetaminophen] Other (See Comments)     hallucinations     Pregabalin Itching     Other reaction(s): Fatigue     Shrimp Swelling     Spironolactone Other (See Comments)     Dehydrated       Terazosin      Ciprofloxacin Rash     Simvastatin Palpitations     Muscle weakness, leg cramping         FAMILY HISTORY:  Family History   Problem Relation Age of Onset     Cancer Sister 82        bladder cancer     No family history of premature CAD or sudden death.    SOCIAL HISTORY:  Social History     Tobacco Use     Smoking status: Former Smoker     Packs/day: 0.30     Years: 15.00     Pack years: 4.50     Types: Cigarettes     Smokeless tobacco: Never Used     Tobacco comment: Quit 22+ years ago   Substance Use Topics     Alcohol use: None     Drug use: No       ROS:   A comprehensive 14 point review of systems is negative other than as mentioned in HPI.    Exam:  BP (!) 150/92 (BP Location: Right arm, Patient Position: Sitting, Cuff Size: Adult Regular)   Pulse 76   Ht 1.702 m (5' 7\")   SpO2 100%   BMI 25.06 kg/m    GENERAL APPEARANCE: healthy, alert and no distress  EYES: no icterus, no xanthelasmas  ENT: normal palate, mucosa moist, no central cyanosis  NECK: no adenopathy, no asymmetry, masses, or scars, thyroid normal to palpation and no bruits, JVP not elevated  RESPIRATORY: lungs clear to auscultation - no rales, rhonchi or wheezes, no use of accessory muscles, no retractions, respirations are unlabored, normal " respiratory rate  CARDIOVASCULAR: regular rhythm, normal S1 with physiologic split S2, no S3 or S4 and no murmur, click or rub, precordium quiet with normal PMI.  GI: soft, non tender, without hepatosplenomegaly, no masses palpable, bowel sounds normal, aorta not enlarged by palpation, no abdominal bruits  EXTREMITIES: peripheral pulses normal, no edema, no bruits  NEURO: alert and oriented to person/place/time, normal speech, gait and affect  VASC: Radial, dorsalis pedis and posterior tibialis pulses 2+ bilaterally.  SKIN: no ecchymoses, no rashes.  PSYCH: cooperative, affect appropriate.     Labs:  Reviewed.       Testing/Procedures:  I personally visualized her prior coronary angiograms from 3549-8423, results as per HPI.  Reviewed device check today, normal function, 84% A-paced 0.1% V-paced, no arrhythmias detected.     TTE 10/15/19 (my read):   Normal LV/RV function, LVEF=60-65%.   At least moderate MR and moderate-to-severe TR.  RVSP~39+RAP~15 mmHg.  Compared to 7/24/13: MR and TR have worsened.     Assessment and Plan:   1. CAD s/p multiple PCIs as above (last to RPDA 7/23/13) and known 80% ISR of small mLCX, without angina:   -continue DAPT indefinitely  -continue statin    2. Moderate mitral regurgitation  3. Moderate-to-severe tricuspid regurgitation  -no clinical stigmata of significant MR or TR and stable functional capacity, follow clinically  -repeat TTE next year    4. Renal Hypertension  -repeat BP improved (150/92) albeit still somewhat elevated, followed by Odilia Martin and Scar    5. Sick sinus syndrome s/p dual chamber PPM:  Followed by Dr. Edwards. Device check today shows normal function.     The patient states understanding and is agreeable with plan.     Tyrell Santoyo MD  Cardiology    CC  ESTABLISHED PATIENT

## 2019-12-11 ENCOUNTER — PRE VISIT (OUTPATIENT)
Dept: FAMILY MEDICINE | Facility: CLINIC | Age: 84
End: 2019-12-11

## 2019-12-12 LAB
MDC_IDC_LEAD_IMPLANT_DT: NORMAL
MDC_IDC_LEAD_IMPLANT_DT: NORMAL
MDC_IDC_LEAD_LOCATION: NORMAL
MDC_IDC_LEAD_LOCATION: NORMAL
MDC_IDC_LEAD_LOCATION_DETAIL_1: NORMAL
MDC_IDC_LEAD_LOCATION_DETAIL_1: NORMAL
MDC_IDC_LEAD_MFG: NORMAL
MDC_IDC_LEAD_MFG: NORMAL
MDC_IDC_LEAD_MODEL: NORMAL
MDC_IDC_LEAD_MODEL: NORMAL
MDC_IDC_LEAD_POLARITY_TYPE: NORMAL
MDC_IDC_LEAD_POLARITY_TYPE: NORMAL
MDC_IDC_LEAD_SERIAL: NORMAL
MDC_IDC_LEAD_SERIAL: NORMAL
MDC_IDC_LEAD_SPECIAL_FUNCTION: NORMAL
MDC_IDC_LEAD_SPECIAL_FUNCTION: NORMAL
MDC_IDC_MSMT_BATTERY_DTM: NORMAL
MDC_IDC_MSMT_BATTERY_REMAINING_LONGEVITY: 162 MO
MDC_IDC_MSMT_BATTERY_RRT_TRIGGER: 2.62
MDC_IDC_MSMT_BATTERY_STATUS: NORMAL
MDC_IDC_MSMT_BATTERY_VOLTAGE: 3.21 V
MDC_IDC_MSMT_LEADCHNL_RA_IMPEDANCE_VALUE: 342 OHM
MDC_IDC_MSMT_LEADCHNL_RA_IMPEDANCE_VALUE: 380 OHM
MDC_IDC_MSMT_LEADCHNL_RA_PACING_THRESHOLD_AMPLITUDE: 0.5 V
MDC_IDC_MSMT_LEADCHNL_RA_PACING_THRESHOLD_PULSEWIDTH: 0.4 MS
MDC_IDC_MSMT_LEADCHNL_RA_SENSING_INTR_AMPL: 3.5 MV
MDC_IDC_MSMT_LEADCHNL_RV_IMPEDANCE_VALUE: 418 OHM
MDC_IDC_MSMT_LEADCHNL_RV_IMPEDANCE_VALUE: 475 OHM
MDC_IDC_MSMT_LEADCHNL_RV_PACING_THRESHOLD_AMPLITUDE: 1 V
MDC_IDC_MSMT_LEADCHNL_RV_PACING_THRESHOLD_PULSEWIDTH: 0.4 MS
MDC_IDC_MSMT_LEADCHNL_RV_SENSING_INTR_AMPL: 12.4 MV
MDC_IDC_PG_IMPLANT_DTM: NORMAL
MDC_IDC_PG_MFG: NORMAL
MDC_IDC_PG_MODEL: NORMAL
MDC_IDC_PG_SERIAL: NORMAL
MDC_IDC_PG_TYPE: NORMAL
MDC_IDC_SESS_CLINIC_NAME: NORMAL
MDC_IDC_SESS_DTM: NORMAL
MDC_IDC_SESS_TYPE: NORMAL
MDC_IDC_SET_BRADY_AT_MODE_SWITCH_RATE: 171 {BEATS}/MIN
MDC_IDC_SET_BRADY_HYSTRATE: NORMAL
MDC_IDC_SET_BRADY_LOWRATE: 60 {BEATS}/MIN
MDC_IDC_SET_BRADY_MAX_SENSOR_RATE: 130 {BEATS}/MIN
MDC_IDC_SET_BRADY_MAX_TRACKING_RATE: 130 {BEATS}/MIN
MDC_IDC_SET_BRADY_MODE: NORMAL
MDC_IDC_SET_BRADY_PAV_DELAY_LOW: 180 MS
MDC_IDC_SET_BRADY_SAV_DELAY_LOW: 150 MS
MDC_IDC_SET_LEADCHNL_RA_PACING_AMPLITUDE: 1.5 V
MDC_IDC_SET_LEADCHNL_RA_PACING_ANODE_ELECTRODE_1: NORMAL
MDC_IDC_SET_LEADCHNL_RA_PACING_ANODE_LOCATION_1: NORMAL
MDC_IDC_SET_LEADCHNL_RA_PACING_CAPTURE_MODE: NORMAL
MDC_IDC_SET_LEADCHNL_RA_PACING_CATHODE_ELECTRODE_1: NORMAL
MDC_IDC_SET_LEADCHNL_RA_PACING_CATHODE_LOCATION_1: NORMAL
MDC_IDC_SET_LEADCHNL_RA_PACING_POLARITY: NORMAL
MDC_IDC_SET_LEADCHNL_RA_PACING_PULSEWIDTH: 0.4 MS
MDC_IDC_SET_LEADCHNL_RA_SENSING_ANODE_ELECTRODE_1: NORMAL
MDC_IDC_SET_LEADCHNL_RA_SENSING_ANODE_LOCATION_1: NORMAL
MDC_IDC_SET_LEADCHNL_RA_SENSING_CATHODE_ELECTRODE_1: NORMAL
MDC_IDC_SET_LEADCHNL_RA_SENSING_CATHODE_LOCATION_1: NORMAL
MDC_IDC_SET_LEADCHNL_RA_SENSING_POLARITY: NORMAL
MDC_IDC_SET_LEADCHNL_RA_SENSING_SENSITIVITY: 0.3 MV
MDC_IDC_SET_LEADCHNL_RV_PACING_AMPLITUDE: 2 V
MDC_IDC_SET_LEADCHNL_RV_PACING_ANODE_ELECTRODE_1: NORMAL
MDC_IDC_SET_LEADCHNL_RV_PACING_ANODE_LOCATION_1: NORMAL
MDC_IDC_SET_LEADCHNL_RV_PACING_CAPTURE_MODE: NORMAL
MDC_IDC_SET_LEADCHNL_RV_PACING_CATHODE_ELECTRODE_1: NORMAL
MDC_IDC_SET_LEADCHNL_RV_PACING_CATHODE_LOCATION_1: NORMAL
MDC_IDC_SET_LEADCHNL_RV_PACING_POLARITY: NORMAL
MDC_IDC_SET_LEADCHNL_RV_PACING_PULSEWIDTH: 0.4 MS
MDC_IDC_SET_LEADCHNL_RV_SENSING_ANODE_ELECTRODE_1: NORMAL
MDC_IDC_SET_LEADCHNL_RV_SENSING_ANODE_LOCATION_1: NORMAL
MDC_IDC_SET_LEADCHNL_RV_SENSING_CATHODE_ELECTRODE_1: NORMAL
MDC_IDC_SET_LEADCHNL_RV_SENSING_CATHODE_LOCATION_1: NORMAL
MDC_IDC_SET_LEADCHNL_RV_SENSING_POLARITY: NORMAL
MDC_IDC_SET_LEADCHNL_RV_SENSING_SENSITIVITY: 0.9 MV
MDC_IDC_SET_ZONE_DETECTION_INTERVAL: 350 MS
MDC_IDC_SET_ZONE_DETECTION_INTERVAL: 400 MS
MDC_IDC_SET_ZONE_TYPE: NORMAL
MDC_IDC_STAT_BRADY_AP_VP_PERCENT: 0.03 %
MDC_IDC_STAT_BRADY_AP_VS_PERCENT: 84.11 %
MDC_IDC_STAT_BRADY_AS_VP_PERCENT: 0.01 %
MDC_IDC_STAT_BRADY_AS_VS_PERCENT: 15.85 %
MDC_IDC_STAT_BRADY_DTM_END: NORMAL
MDC_IDC_STAT_BRADY_DTM_START: NORMAL
MDC_IDC_STAT_BRADY_RA_PERCENT_PACED: 84.21 %
MDC_IDC_STAT_BRADY_RV_PERCENT_PACED: 0.04 %
MDC_IDC_STAT_EPISODE_RECENT_COUNT: 0
MDC_IDC_STAT_EPISODE_RECENT_COUNT_DTM_END: NORMAL
MDC_IDC_STAT_EPISODE_RECENT_COUNT_DTM_START: NORMAL
MDC_IDC_STAT_EPISODE_TOTAL_COUNT: 0
MDC_IDC_STAT_EPISODE_TOTAL_COUNT_DTM_END: NORMAL
MDC_IDC_STAT_EPISODE_TOTAL_COUNT_DTM_START: NORMAL
MDC_IDC_STAT_EPISODE_TYPE: NORMAL

## 2019-12-17 ENCOUNTER — OFFICE VISIT (OUTPATIENT)
Dept: FAMILY MEDICINE | Facility: CLINIC | Age: 84
End: 2019-12-17
Payer: COMMERCIAL

## 2019-12-17 VITALS
OXYGEN SATURATION: 98 % | TEMPERATURE: 98 F | RESPIRATION RATE: 18 BRPM | DIASTOLIC BLOOD PRESSURE: 73 MMHG | HEART RATE: 69 BPM | SYSTOLIC BLOOD PRESSURE: 186 MMHG | HEIGHT: 67 IN | WEIGHT: 169.8 LBS | BODY MASS INDEX: 26.65 KG/M2

## 2019-12-17 DIAGNOSIS — T14.8XXA BRUISING: Primary | ICD-10-CM

## 2019-12-17 DIAGNOSIS — M10.00 IDIOPATHIC GOUT, UNSPECIFIED CHRONICITY, UNSPECIFIED SITE: ICD-10-CM

## 2019-12-17 DIAGNOSIS — I10 ESSENTIAL HYPERTENSION: ICD-10-CM

## 2019-12-17 RX ORDER — HYDRALAZINE HYDROCHLORIDE 10 MG/1
10 TABLET, FILM COATED ORAL 3 TIMES DAILY
Qty: 90 TABLET | Refills: 1 | Status: SHIPPED | OUTPATIENT
Start: 2019-12-17 | End: 2020-08-10

## 2019-12-17 RX ORDER — ALLOPURINOL 100 MG/1
100 TABLET ORAL DAILY
Qty: 90 TABLET | Refills: 3 | Status: SHIPPED | OUTPATIENT
Start: 2019-12-17 | End: 2020-12-11

## 2019-12-17 RX ORDER — METOPROLOL TARTRATE 100 MG
100 TABLET ORAL 2 TIMES DAILY
Qty: 180 TABLET | Refills: 3 | Status: SHIPPED | OUTPATIENT
Start: 2019-12-17 | End: 2020-12-11

## 2019-12-17 ASSESSMENT — PAIN SCALES - GENERAL: PAINLEVEL: MODERATE PAIN (5)

## 2019-12-17 ASSESSMENT — MIFFLIN-ST. JEOR: SCORE: 1262.84

## 2019-12-17 NOTE — PATIENT INSTRUCTIONS
Your health care Provider has recommended that you receive the new Shingle vaccine called Shingrix to prevent shingles for ages 50 and above. Many private insurance and Medicare Part D plans cover Shingrix. However, not all insurance carriers cover the entire cost of the Shingrix vaccine if the vaccine is administered at your primary care clinic. The clinic cannot determine your insurance benefits.  Please call your insurance carrier prior. The vaccine comes in two doses. Your second dose should be 2-6 months from your first dose.       Prior to receiving the vaccine, we recommend that you call your insurance carrier and ask them the following questions:            1. Is there a cost difference if I receive the vaccine at my doctor's office or a pharmacy?          2. Does my insurance cover the Shingrix Vaccine and administration of the vaccine?          3. What is my co-pay or deductible for the vaccine?        Please call to schedule a Nurse-Visit only at [phone].  Nurse Visit hours are available Monday, Wednesday, and Friday from 9:00 AM-11:00 AM and 1:00 PM-3:00 PM.       [facility] Medication Refill Request Information:  * Please contact your pharmacy regarding ANY request for medication refills.  ** [facility] Prescription Fax = [phone]  * Please allow 3 business days for routine medication refills.  * Please allow 5 business days for controlled substance medication refills.     [facility] Test Result notification information:  *You will be notified with in 7-10 days of your appointment day regarding the results of your test.  If you are on MyChart you will be notified as soon as the provider has reviewed the results and signed off on them.

## 2019-12-17 NOTE — PROGRESS NOTES
[facility] [place]   Pedro Gomez MD  12/17/2019      Chief Complaint:   Fall and Refill Request       History of Present Illness:   Tessa Mansfield is a 84 year old female with a history of coronary artery disease, chronic kidney disease, hypertension, hyperlipidemia, and NSTEMI who presents for evaluation of bruising.    Bruising: She fell down some stairs one week ago here at the [facility] after she tripped over a railing. She now has bruising on her bilateral feet and lower legs, and is concerned about the possibility of deep vein thrombosis. The bruising began the morning following the incident. There was no immediate pain, but the bruising is tender and she has some general leg pain. She takes Plavix 75 mg once daily.      Hypertension: Her blood pressure today is 186/73. She checks her blood pressure at home, and reports that it is high. Her systolic pressure is usually in the 160s. She currently takes Amlodipine 2.5 mg daily, Furosemide 40 mg daily, Lopressor 100 mg twice daily. She follows up with nephrology in January.     Other concerns discussed:  1. She had a small lump on her jaw at her last primary care visit. An ultrasound was completed and no focal lesion was identified.      Review of Systems:   Pertinent items are noted in HPI or as in patient entered ROS below, remainder of complete ROS is negative.     Active Medications:     Current Outpatient Medications:      allopurinol (ZYLOPRIM) 100 MG tablet, Take 1 tablet (100 mg) by mouth daily, Disp: 90 tablet, Rfl: 3     amLODIPine (NORVASC) 2.5 MG tablet, Take 1 tablet (2.5 mg) by mouth daily, Disp: 30 tablet, Rfl: 11     aspirin 81 MG tablet, Take 1 tablet by mouth daily., Disp: , Rfl:      clopidogrel (PLAVIX) 75 MG tablet, TAKE 1 TABLET BY MOUTH ONCE DAILY, Disp: 90 tablet, Rfl: 1     cyanocolbalamin (VITAMIN B-12) 1000 MCG tablet, Take 500 mcg by mouth daily As directed, Disp: , Rfl:      furosemide (LASIX) 40 MG tablet, Take 1 tablet  (40 mg) by mouth daily, Disp: 30 tablet, Rfl: 11     hydrALAZINE (APRESOLINE) 10 MG tablet, Take 1 tablet (10 mg) by mouth 3 times daily, Disp: 90 tablet, Rfl: 1     isosorbide mononitrate (ISMO/MONOKET) 20 MG tablet, Take 2 tablets (40 mg) by mouth 3 times daily, Disp: 540 tablet, Rfl: 0     isosorbide mononitrate (ISMO/MONOKET) 20 MG tablet, Take 2 tablets (40 mg) by mouth 3 times daily, Disp: 540 tablet, Rfl: 0     metoprolol tartrate (LOPRESSOR) 100 MG tablet, Take 1 tablet (100 mg) by mouth 2 times daily, Disp: 180 tablet, Rfl: 3     multivitamin  with lutein (OCUVITE WITH LTEIN) CAPS, Take 1 capsule by mouth daily, Disp: , Rfl:      NITROSTAT 0.4 MG sublingual tablet, DISSOLVE ONE TABLET UNDER THE TONGUE EVERY 5 MINUTES AS NEEDED FOR CHEST PAIN.  DO NOT EXCEED A TOTAL OF 3 DOSES IN 15 MINUTES. CALL 911., Disp: 25 tablet, Rfl: 3     omeprazole (PRILOSEC) 40 MG DR capsule, TAKE ONE CAPSULE BY MOUTH ONCE DAILY, Disp: 90 capsule, Rfl: 3     order for DME, Equipment being ordered: Left hand and wrist brace, Disp: 1 each, Rfl: 0     potassium chloride ER (K-TAB/KLOR-CON) 10 MEQ CR tablet, TAKE TWO TABLETS BY MOUTH DAILY IN THE MORNING AND ONE TABLET IN THE EVENING, Disp: 270 tablet, Rfl: 3     pravastatin (PRAVACHOL) 80 MG tablet, TAKE 1 TABLET BY MOUTH ONCE DAILY AT BEDTIME, Disp: 90 tablet, Rfl: 2     timolol (TIMOPTIC) 0.5 % ophthalmic solution, Place 1 drop into both eyes daily , Disp: , Rfl:       Allergies:   Bactrim [sulfamethoxazole w/trimethoprim]; Biaxin [clarithromycin]; Chlorthalidone; Clonidine; Codeine sulfate; Darvon [propoxyphene hcl]; Dilaudid [hydromorphone]; Gabapentin; Indocin; Levaquin [levofloxacin hemihydrate]; Lyrica; Morphine sulfate; Percocet [oxycodone-acetaminophen]; Pregabalin; Shrimp; Spironolactone; Terazosin; Ciprofloxacin; and Simvastatin      Past Medical History:  Coronary artery disease  Cardiac pacemaker- medtronic, dual chamber, not dependent   Chronic kidney disease, stage  "4  Hyperlipidemia  Hypertension  Stented coronary artery   Transient complete heart block   Degeneration of cervical intervertebral disc  Nonallopathic lesion of cervical region  Nonallopathic lesion of thoracic region  Edema  Esophageal reflux  Obstructive sleep apnea  Osteomalacia   Post-menopausal osteoporosis  Sinus node dysfunction  NSTEMI  Arrhythmia  Sick sinus syndrome  Non-rheumatic mitral regurgitation  Non-rheumatic tricuspid valve insufficiency      Past Surgical History:  Cholecystectomy  EP pacemaker: 10/15/2019  PPM insertion with atrial and ventricular lead: 2007  Hysterectomy     Family History:   Bladder cancer: sister       Social History:   Smoking status: former smoker     Physical Exam:   BP (!) 186/73 (BP Location: Right arm, Patient Position: Chair, Cuff Size: Adult Regular)   Pulse 69   Temp 98  F (36.7  C) (Oral)   Resp 18   Ht 1.702 m (5' 7\")   Wt 77 kg (169 lb 12.8 oz)   SpO2 98%   Breastfeeding No   BMI 26.59 kg/m     Constitutional: Alert. In no distress.  Head: The scalp, face, and head appear normal.  Musculoskeletal: 2+ swelling of both legs. She has 2 inch round bruise on the left mid medial calf region. The skin is intact. She has a slightly smaller bruise on the right distal shin region with intact skin also. Both of these there is some distal extravasation. The bruised areas themselves are mildly tender.   Neurologic: Gait normal. Speech is normal and fluent.  Psychiatric: Mentation appears normal. Normal affect.      Assessment and Plan:  Idiopathic gout, unspecified chronicity, unspecified site  No recent gout flares.   - allopurinol (ZYLOPRIM) 100 MG tablet  Dispense: 90 tablet; Refill: 3    Essential hypertension  Blood pressure is running too high. We will try low dose Hydralazine and follow up in the renal clinic soon. She will continue her other blood pressure medicine.   - metoprolol tartrate (LOPRESSOR) 100 MG tablet  Dispense: 180 tablet; Refill: 3  - " hydrALAZINE (APRESOLINE) 10 MG tablet  Dispense: 90 tablet; Refill: 1    Leg trauma  She was worried the bruising could represent DVTs. I explained that these will not cause problems, but just need time to resolve.     Follow-up: Return in about 6 months (around 6/17/2020).        Scribe Disclosure:  Marni DENNEY, am serving as a scribe to document services personally performed by Pedro Gomez MD at this visit, based upon the provider's statements to me. All documentation has been reviewed by the aforementioned provider prior to being entered into the official medical record.    Scribe Preparation Attestation:  Marni DENNEY, a scribe, prepared the chart for today's encounter.      Portions of this medical record were completed by a scribe. UPON MY REVIEW AND AUTHENTICATION BY ELECTRONIC SIGNATURE, this confirms (a) I performed the applicable clinical services, and (b) the record is accurate.   Pedro Gomez MD MD

## 2020-01-06 ENCOUNTER — OFFICE VISIT (OUTPATIENT)
Dept: NEPHROLOGY | Facility: CLINIC | Age: 85
End: 2020-01-06
Payer: COMMERCIAL

## 2020-01-06 VITALS
BODY MASS INDEX: 26.63 KG/M2 | WEIGHT: 170 LBS | OXYGEN SATURATION: 99 % | DIASTOLIC BLOOD PRESSURE: 76 MMHG | HEART RATE: 88 BPM | SYSTOLIC BLOOD PRESSURE: 170 MMHG

## 2020-01-06 DIAGNOSIS — E87.6 HYPOKALEMIA: ICD-10-CM

## 2020-01-06 DIAGNOSIS — I10 ESSENTIAL HYPERTENSION: ICD-10-CM

## 2020-01-06 DIAGNOSIS — N18.4 CHRONIC KIDNEY DISEASE, STAGE IV (SEVERE) (H): ICD-10-CM

## 2020-01-06 DIAGNOSIS — N18.4 STAGE 4 CHRONIC KIDNEY DISEASE (H): Primary | ICD-10-CM

## 2020-01-06 DIAGNOSIS — N25.81 SECONDARY RENAL HYPERPARATHYROIDISM (H): ICD-10-CM

## 2020-01-06 DIAGNOSIS — R60.0 LOCALIZED EDEMA: Primary | ICD-10-CM

## 2020-01-06 DIAGNOSIS — N18.4 CKD (CHRONIC KIDNEY DISEASE) STAGE 4, GFR 15-29 ML/MIN (H): ICD-10-CM

## 2020-01-06 DIAGNOSIS — R07.9 CHEST PAIN, UNSPECIFIED TYPE: ICD-10-CM

## 2020-01-06 LAB
ALBUMIN SERPL-MCNC: 3.8 G/DL (ref 3.4–5)
ANION GAP SERPL CALCULATED.3IONS-SCNC: 5 MMOL/L (ref 3–14)
BUN SERPL-MCNC: 33 MG/DL (ref 7–30)
CALCIUM SERPL-MCNC: 9 MG/DL (ref 8.5–10.1)
CHLORIDE SERPL-SCNC: 111 MMOL/L (ref 94–109)
CO2 SERPL-SCNC: 26 MMOL/L (ref 20–32)
CREAT SERPL-MCNC: 1.76 MG/DL (ref 0.52–1.04)
GFR SERPL CREATININE-BSD FRML MDRD: 26 ML/MIN/{1.73_M2}
GLUCOSE SERPL-MCNC: 88 MG/DL (ref 70–99)
MAGNESIUM SERPL-MCNC: 2.4 MG/DL (ref 1.6–2.3)
PHOSPHATE SERPL-MCNC: 3.3 MG/DL (ref 2.5–4.5)
POTASSIUM SERPL-SCNC: 4.6 MMOL/L (ref 3.4–5.3)
PTH-INTACT SERPL-MCNC: 267 PG/ML (ref 18–80)
SODIUM SERPL-SCNC: 142 MMOL/L (ref 133–144)

## 2020-01-06 PROCEDURE — 80069 RENAL FUNCTION PANEL: CPT | Performed by: INTERNAL MEDICINE

## 2020-01-06 PROCEDURE — 36415 COLL VENOUS BLD VENIPUNCTURE: CPT | Performed by: INTERNAL MEDICINE

## 2020-01-06 PROCEDURE — 83970 ASSAY OF PARATHORMONE: CPT | Performed by: INTERNAL MEDICINE

## 2020-01-06 PROCEDURE — 83735 ASSAY OF MAGNESIUM: CPT | Performed by: INTERNAL MEDICINE

## 2020-01-06 RX ORDER — ISOSORBIDE MONONITRATE 20 MG/1
40 TABLET ORAL 2 TIMES DAILY
Qty: 540 TABLET | Refills: 0 | COMMUNITY
Start: 2020-01-06 | End: 2020-08-24

## 2020-01-06 RX ORDER — FUROSEMIDE 40 MG
40 TABLET ORAL 2 TIMES DAILY
Qty: 60 TABLET | Refills: 11 | Status: SHIPPED | OUTPATIENT
Start: 2020-01-06 | End: 2021-02-17

## 2020-01-06 RX ORDER — VITAMIN B COMPLEX
25 TABLET ORAL DAILY
Qty: 30 TABLET | Refills: 11 | Status: SHIPPED | OUTPATIENT
Start: 2020-01-06 | End: 2021-01-05

## 2020-01-06 NOTE — PATIENT INSTRUCTIONS
Stop hydralazine  Start furosemide twice a day, morning and mid afternoon  Vitamin D supplement daily (cholecalciferol 1000mg or 200mg)  Check blood pressure a few times a week  Call with questions [phone]  Labs in a month

## 2020-01-06 NOTE — PROGRESS NOTES
Assessment and Plan:   84 year old female with history of long standing HTN, CAD s/p multiple stents, who presents for followup of CKD stage 3, baseline SCr 1.8-2.2 mg/dL without proteinuria.  1. CKD stage 3- baseline SCr 1.8-2.2mg/ dL. Mild/ minimal proteinuria     - she has gained eight and has more swelling  - BP higher also   -check labs today  2. Electrolytes/Acid Base status- normal.    Hypokalemia- takes 3 potassium pills daily   - if she has more cramps, should let us know and eat high K diet.  3. Hypertension/Volume status-  She is up a little and swelling is also worse. She is on metoprolol 100 mg BID, furosemide 40 mg once a day,    - at prior visit, we had her increase furosemide to bid, which she did for a couple days but then returned to once a day as BP were good  - we held amlodipine when BP was lower with weight loss, would restart if BP >140 -145 consistently  - will increase furosemide to bid given her edema and BP today  - stop hydralazine  Last 2D ECHO in 07/2013 showed normal LVF 65 %, no significant valvular abnormalities, normal RV function.  -patient reports history of reaction/iontolerance to Spironolactone, Chlorthalidone, Clonidine and Terazosin in the past   -patient was advised to monitor her BP daily and call nephrology clinic if her BP is persistently above 140/90 mmHg  - will have Christy call her    4. Anemia- Improved. Hgb is 12.4 g/dl. Iron studies were not recently checked    5. BMD  -normal serum calcium and phosphorus  -secondary hyperparathyroidism-PTH is 184. Patient had PTH of 144 in 11/17. We will follow      Assessment and plan was discussed with patient and she voiced her understanding and agreement.        Reason for Visit:  Tessa Mansfield is a 84 year old female with HTN, who presents for CKD management.     HPI:  She is a pleasant female with PMMHx significant for stage III CKD presumed secondary to hypertensive nephrosclerosis.Her renal function has been  relatively  stable, ranging from 1.8-2.2 mg/dL over the years. She has history of CAD s/p multiple stents by Dr Mir (7 stents in all) since 2004 and a pacemaker in 2007. She follows with cardiologist Dr Santoyo and Dr Zhu.  Since her last visit she has been well , without major illness, no hospitalizations . Patient states that she has not been checking her blood pressure at home.  She denies dyspnea, can be fairly active though admits to being lazy. She denies GI or  issues. Her appetite is good and her  'takes care of her'- he does most of the cooking. Patient states that she follows low sodium diet.  She had joined weight watchers and has lost a significant amount of weight and we held her amlodipine given lower BP  She now gained back some, especially with the holidays, around 15 lbs.  Her blood pressures have been higher.  Her creatinine a few months ago is stable.      Home BP: not checked    Baseline Cr: 1.8-2.2    ROS:  A comprehensive review of systems was obtained and negative, except as noted in the HPI or PMH.    Active Medical Problems:  Patient Active Problem List   Diagnosis     Degeneration of cervical intervertebral disc     Nonallopathic lesion of cervical region     Nonallopathic lesion of thoracic region     CAD s/p PCI+BMS to MRCA 10/2002, PCI to mLCX 2/2003, PCI+DESx2 to pLAD 11/2007, PCI+DESx1 to dRCA 12/2007, PCI+ALVAREZ to RPDA 7/2013; on indefinite DAPT w/o angina     Dizziness and giddiness     Edema     Esophageal reflux     Gout     Essential hypertension     Obstructive sleep apnea     Osteomalacia     Post-menopausal osteoporosis     Cardiac Pacemaker- Medtronic, dual chamber- NOT dependant     Transient complete heart block (H)     Sinus node dysfunction (H)     Disturbance of skin sensation     NSTEMI (non-ST elevated myocardial infarction) (H)     Arrhythmia     Sick sinus syndrome (H)     Non-rheumatic mitral regurgitation     Non-rheumatic tricuspid valve insufficiency        Personal Hx:   Social History     Socioeconomic History     Marital status:      Spouse name: Not on file     Number of children: 4     Years of education: Not on file     Highest education level: Not on file   Occupational History     Employer: [employer]   Social Needs     Financial resource strain: Not on file     Food insecurity:     Worry: Not on file     Inability: Not on file     Transportation needs:     Medical: Not on file     Non-medical: Not on file   Tobacco Use     Smoking status: Former Smoker     Packs/day: 0.30     Years: 15.00     Pack years: 4.50     Types: Cigarettes     Smokeless tobacco: Never Used     Tobacco comment: Quit 22+ years ago   Substance and Sexual Activity     Alcohol use: Not on file     Drug use: No     Sexual activity: Not Currently     Partners: Male     Birth control/protection: Post-menopausal   Lifestyle     Physical activity:     Days per week: Not on file     Minutes per session: Not on file     Stress: Not on file   Relationships     Social connections:     Talks on phone: Not on file     Gets together: Not on file     Attends Caodaism service: Not on file     Active member of club or organization: Not on file     Attends meetings of clubs or organizations: Not on file     Relationship status: Not on file     Intimate partner violence:     Fear of current or ex partner: Not on file     Emotionally abused: Not on file     Physically abused: Not on file     Forced sexual activity: Not on file   Other Topics Concern      Service Not Asked     Blood Transfusions Yes     Caffeine Concern Not Asked     Occupational Exposure Not Asked     Hobby Hazards Not Asked     Sleep Concern Not Asked     Stress Concern Not Asked     Weight Concern Not Asked     Special Diet Not Asked     Back Care Not Asked     Exercise No     Bike Helmet Not Asked     Seat Belt Not Asked     Self-Exams Not Asked     Parent/sibling w/ CABG, MI or angioplasty before 65F  "55M? Not Asked   Social History Narrative     Not on file       Allergies:  Allergies   Allergen Reactions     Bactrim [Sulfamethoxazole W/Trimethoprim] Other (See Comments)     Patient unable to recall     Biaxin [Clarithromycin]      Chlorthalidone Nausea and Vomiting     Clonidine      Codeine Sulfate Itching     Darvon [Propoxyphene Hcl]      Dilaudid [Hydromorphone] Visual Disturbance     Hallucinations       Gabapentin Other (See Comments) and Fatigue     \"felt drunk\"     Indocin      Levaquin [Levofloxacin Hemihydrate]      Lyrica Fatigue     Morphine Sulfate      Percocet [Oxycodone-Acetaminophen] Other (See Comments)     hallucinations     Pregabalin Itching     Other reaction(s): Fatigue     Shrimp Swelling     Spironolactone Other (See Comments)     Dehydrated       Terazosin      Ciprofloxacin Rash     Simvastatin Palpitations     Muscle weakness, leg cramping         Current Outpatient Medications   Medication     allopurinol (ZYLOPRIM) 100 MG tablet     amLODIPine (NORVASC) 2.5 MG tablet     aspirin 81 MG tablet     clopidogrel (PLAVIX) 75 MG tablet     cyanocolbalamin (VITAMIN B-12) 1000 MCG tablet     furosemide (LASIX) 40 MG tablet     hydrALAZINE (APRESOLINE) 10 MG tablet     isosorbide mononitrate (ISMO/MONOKET) 20 MG tablet     metoprolol tartrate (LOPRESSOR) 100 MG tablet     Multiple Vitamins-Minerals (PRESERVISION AREDS 2+MULTI VIT PO)     NITROSTAT 0.4 MG sublingual tablet     omeprazole (PRILOSEC) 40 MG DR capsule     order for DME     potassium chloride ER (K-TAB/KLOR-CON) 10 MEQ CR tablet     pravastatin (PRAVACHOL) 80 MG tablet     timolol (TIMOPTIC) 0.5 % ophthalmic solution     No current facility-administered medications for this visit.        Vitals:  BP (!) 170/76 (BP Location: Left arm)   Pulse 88   Wt 77.1 kg (170 lb)   SpO2 99%   BMI 26.63 kg/m      Exam:  GENERAL APPEARANCE: alert and no distress  HENT: mouth without ulcers or lesions  LYMPHATICS: no cervical or " supraclavicular nodes  RESP: lungs clear to auscultation - no rales, rhonchi or wheezes  CV: regular rhythm, normal rate, no rub, no murmur  EDEMA: 1+ pitting LE edema bilaterally  ABDOMEN: soft, nondistended, nontender, bowel sounds normal  MS: extremities normal - no gross deformities noted, no evidence of inflammation in joints, no muscle tenderness  SKIN: no rash    Results:  Last Comprehensive Metabolic Panel:  Sodium   Date Value Ref Range Status   10/15/2019 142 133 - 144 mmol/L Final     Potassium   Date Value Ref Range Status   10/15/2019 4.4 3.4 - 5.3 mmol/L Final     Chloride   Date Value Ref Range Status   10/15/2019 115 (H) 94 - 109 mmol/L Final     Carbon Dioxide   Date Value Ref Range Status   10/15/2019 23 20 - 32 mmol/L Final     Anion Gap   Date Value Ref Range Status   10/15/2019 5 3 - 14 mmol/L Final     Glucose   Date Value Ref Range Status   10/15/2019 87 70 - 99 mg/dL Final     Urea Nitrogen   Date Value Ref Range Status   10/15/2019 56 (H) 7 - 30 mg/dL Final     Creatinine   Date Value Ref Range Status   10/15/2019 2.19 (H) 0.52 - 1.04 mg/dL Final     GFR Estimate   Date Value Ref Range Status   10/15/2019 20 (L) >60 mL/min/[1.73_m2] Final     Comment:     Non  GFR Calc  Starting 12/18/2018, serum creatinine based estimated GFR (eGFR) will be   calculated using the Chronic Kidney Disease Epidemiology Collaboration   (CKD-EPI) equation.       Calcium   Date Value Ref Range Status   10/15/2019 8.5 8.5 - 10.1 mg/dL Final       Last Basic Metabolic Panel:  Lab Results   Component Value Date     11/08/2017      Lab Results   Component Value Date    POTASSIUM 3.5 11/08/2017     Lab Results   Component Value Date    CHLORIDE 104 11/08/2017     Lab Results   Component Value Date    ALEXANDRO 8.8 11/08/2017     Lab Results   Component Value Date    CO2 26 11/08/2017     Lab Results   Component Value Date    BUN 54 11/08/2017     Lab Results   Component Value Date    CR 2.36 11/08/2017      Lab Results   Component Value Date    GLC 88 11/08/2017         Marzena Martin

## 2020-01-06 NOTE — LETTER
1/6/2020       RE: Tessa Mansfield  [address]  [facility] [address]     Dear Colleague,    Thank you for referring your patient, Tessa Mansfield, to the [facility] at [facility]. Please see a copy of my visit note below.      Assessment and Plan:   84 year old female with history of long standing HTN, CAD s/p multiple stents, who presents for followup of CKD stage 3, baseline SCr 1.8-2.2 mg/dL without proteinuria.  1. CKD stage 3- baseline SCr 1.8-2.2mg/ dL. Mild/ minimal proteinuria     - she has gained eight and has more swelling  - BP higher also   -check labs today  2. Electrolytes/Acid Base status- normal.    Hypokalemia- takes 3 potassium pills daily   - if she has more cramps, should let us know and eat high K diet.  3. Hypertension/Volume status-  She is up a little and swelling is also worse. She is on metoprolol 100 mg BID, furosemide 40 mg once a day,    - at prior visit, we had her increase furosemide to bid, which she did for a couple days but then returned to once a day as BP were good  - we held amlodipine when BP was lower with weight loss, would restart if BP >140 -145 consistently  - will increase furosemide to bid given her edema and BP today  - stop hydralazine  Last 2D ECHO in 07/2013 showed normal LVF 65 %, no significant valvular abnormalities, normal RV function.  -patient reports history of reaction/iontolerance to Spironolactone, Chlorthalidone, Clonidine and Terazosin in the past   -patient was advised to monitor her BP daily and call nephrology clinic if her BP is persistently above 140/90 mmHg  - will have Christy call her    4. Anemia- Improved. Hgb is 12.4 g/dl. Iron studies were not recently checked    5. BMD  -normal serum calcium and phosphorus  -secondary hyperparathyroidism-PTH is 184. Patient had PTH of 144 in 11/17. We will follow      Assessment and plan was discussed with patient and she voiced her understanding and  agreement.        Reason for Visit:  Tessa Mansfield is a 84 year old female with HTN, who presents for CKD management.     HPI:  She is a pleasant female with PMMHx significant for stage III CKD presumed secondary to hypertensive nephrosclerosis.Her renal function has been  relatively stable, ranging from 1.8-2.2 mg/dL over the years. She has history of CAD s/p multiple stents by Dr Mir (7 stents in all) since 2004 and a pacemaker in 2007. She follows with cardiologist Dr Santoyo and Dr Zhu.  Since her last visit she has been well , without major illness, no hospitalizations . Patient states that she has not been checking her blood pressure at home.  She denies dyspnea, can be fairly active though admits to being lazy. She denies GI or  issues. Her appetite is good and her  'takes care of her'- he does most of the cooking. Patient states that she follows low sodium diet.  She had joined weight watchers and has lost a significant amount of weight and we held her amlodipine given lower BP  She now gained back some, especially with the holidays, around 15 lbs.  Her blood pressures have been higher.  Her creatinine a few months ago is stable.      Home BP: not checked    Baseline Cr: 1.8-2.2    ROS:  A comprehensive review of systems was obtained and negative, except as noted in the HPI or PMH.    Active Medical Problems:  Patient Active Problem List   Diagnosis     Degeneration of cervical intervertebral disc     Nonallopathic lesion of cervical region     Nonallopathic lesion of thoracic region     CAD s/p PCI+BMS to MRCA 10/2002, PCI to mLCX 2/2003, PCI+DESx2 to pLAD 11/2007, PCI+DESx1 to dRCA 12/2007, PCI+ALVAREZ to RPDA 7/2013; on indefinite DAPT w/o angina     Dizziness and giddiness     Edema     Esophageal reflux     Gout     Essential hypertension     Obstructive sleep apnea     Osteomalacia     Post-menopausal osteoporosis     Cardiac Pacemaker- Medtronic, dual chamber- NOT dependant     Transient  complete heart block (H)     Sinus node dysfunction (H)     Disturbance of skin sensation     NSTEMI (non-ST elevated myocardial infarction) (H)     Arrhythmia     Sick sinus syndrome (H)     Non-rheumatic mitral regurgitation     Non-rheumatic tricuspid valve insufficiency       Personal Hx:   Social History     Socioeconomic History     Marital status:      Spouse name: Not on file     Number of children: 4     Years of education: Not on file     Highest education level: Not on file   Occupational History     Employer: [employer]   Social Needs     Financial resource strain: Not on file     Food insecurity:     Worry: Not on file     Inability: Not on file     Transportation needs:     Medical: Not on file     Non-medical: Not on file   Tobacco Use     Smoking status: Former Smoker     Packs/day: 0.30     Years: 15.00     Pack years: 4.50     Types: Cigarettes     Smokeless tobacco: Never Used     Tobacco comment: Quit 22+ years ago   Substance and Sexual Activity     Alcohol use: Not on file     Drug use: No     Sexual activity: Not Currently     Partners: Male     Birth control/protection: Post-menopausal   Lifestyle     Physical activity:     Days per week: Not on file     Minutes per session: Not on file     Stress: Not on file   Relationships     Social connections:     Talks on phone: Not on file     Gets together: Not on file     Attends Adventism service: Not on file     Active member of club or organization: Not on file     Attends meetings of clubs or organizations: Not on file     Relationship status: Not on file     Intimate partner violence:     Fear of current or ex partner: Not on file     Emotionally abused: Not on file     Physically abused: Not on file     Forced sexual activity: Not on file   Other Topics Concern      Service Not Asked     Blood Transfusions Yes     Caffeine Concern Not Asked     Occupational Exposure Not Asked     Hobby Hazards Not Asked     Sleep  "Concern Not Asked     Stress Concern Not Asked     Weight Concern Not Asked     Special Diet Not Asked     Back Care Not Asked     Exercise No     Bike Helmet Not Asked     Seat Belt Not Asked     Self-Exams Not Asked     Parent/sibling w/ CABG, MI or angioplasty before 65F 55M? Not Asked   Social History Narrative     Not on file       Allergies:  Allergies   Allergen Reactions     Bactrim [Sulfamethoxazole W/Trimethoprim] Other (See Comments)     Patient unable to recall     Biaxin [Clarithromycin]      Chlorthalidone Nausea and Vomiting     Clonidine      Codeine Sulfate Itching     Darvon [Propoxyphene Hcl]      Dilaudid [Hydromorphone] Visual Disturbance     Hallucinations       Gabapentin Other (See Comments) and Fatigue     \"felt drunk\"     Indocin      Levaquin [Levofloxacin Hemihydrate]      Lyrica Fatigue     Morphine Sulfate      Percocet [Oxycodone-Acetaminophen] Other (See Comments)     hallucinations     Pregabalin Itching     Other reaction(s): Fatigue     Shrimp Swelling     Spironolactone Other (See Comments)     Dehydrated       Terazosin      Ciprofloxacin Rash     Simvastatin Palpitations     Muscle weakness, leg cramping         Current Outpatient Medications   Medication     allopurinol (ZYLOPRIM) 100 MG tablet     amLODIPine (NORVASC) 2.5 MG tablet     aspirin 81 MG tablet     clopidogrel (PLAVIX) 75 MG tablet     cyanocolbalamin (VITAMIN B-12) 1000 MCG tablet     furosemide (LASIX) 40 MG tablet     hydrALAZINE (APRESOLINE) 10 MG tablet     isosorbide mononitrate (ISMO/MONOKET) 20 MG tablet     metoprolol tartrate (LOPRESSOR) 100 MG tablet     Multiple Vitamins-Minerals (PRESERVISION AREDS 2+MULTI VIT PO)     NITROSTAT 0.4 MG sublingual tablet     omeprazole (PRILOSEC) 40 MG DR capsule     order for DME     potassium chloride ER (K-TAB/KLOR-CON) 10 MEQ CR tablet     pravastatin (PRAVACHOL) 80 MG tablet     timolol (TIMOPTIC) 0.5 % ophthalmic solution     No current facility-administered " medications for this visit.        Vitals:  BP (!) 170/76 (BP Location: Left arm)   Pulse 88   Wt 77.1 kg (170 lb)   SpO2 99%   BMI 26.63 kg/m       Exam:  GENERAL APPEARANCE: alert and no distress  HENT: mouth without ulcers or lesions  LYMPHATICS: no cervical or supraclavicular nodes  RESP: lungs clear to auscultation - no rales, rhonchi or wheezes  CV: regular rhythm, normal rate, no rub, no murmur  EDEMA: 1+ pitting LE edema bilaterally  ABDOMEN: soft, nondistended, nontender, bowel sounds normal  MS: extremities normal - no gross deformities noted, no evidence of inflammation in joints, no muscle tenderness  SKIN: no rash    Results:  Last Comprehensive Metabolic Panel:  Sodium   Date Value Ref Range Status   10/15/2019 142 133 - 144 mmol/L Final     Potassium   Date Value Ref Range Status   10/15/2019 4.4 3.4 - 5.3 mmol/L Final     Chloride   Date Value Ref Range Status   10/15/2019 115 (H) 94 - 109 mmol/L Final     Carbon Dioxide   Date Value Ref Range Status   10/15/2019 23 20 - 32 mmol/L Final     Anion Gap   Date Value Ref Range Status   10/15/2019 5 3 - 14 mmol/L Final     Glucose   Date Value Ref Range Status   10/15/2019 87 70 - 99 mg/dL Final     Urea Nitrogen   Date Value Ref Range Status   10/15/2019 56 (H) 7 - 30 mg/dL Final     Creatinine   Date Value Ref Range Status   10/15/2019 2.19 (H) 0.52 - 1.04 mg/dL Final     GFR Estimate   Date Value Ref Range Status   10/15/2019 20 (L) >60 mL/min/[1.73_m2] Final     Comment:     Non  GFR Calc  Starting 12/18/2018, serum creatinine based estimated GFR (eGFR) will be   calculated using the Chronic Kidney Disease Epidemiology Collaboration   (CKD-EPI) equation.       Calcium   Date Value Ref Range Status   10/15/2019 8.5 8.5 - 10.1 mg/dL Final       Last Basic Metabolic Panel:  Lab Results   Component Value Date     11/08/2017      Lab Results   Component Value Date    POTASSIUM 3.5 11/08/2017     Lab Results   Component Value Date     CHLORIDE 104 11/08/2017     Lab Results   Component Value Date    ALEXANDRO 8.8 11/08/2017     Lab Results   Component Value Date    CO2 26 11/08/2017     Lab Results   Component Value Date    BUN 54 11/08/2017     Lab Results   Component Value Date    CR 2.36 11/08/2017     Lab Results   Component Value Date    GLC 88 11/08/2017         Marzena Martin

## 2020-01-08 DIAGNOSIS — E78.5 HYPERLIPIDEMIA LDL GOAL <130: ICD-10-CM

## 2020-01-08 DIAGNOSIS — E87.6 HYPOKALEMIA: ICD-10-CM

## 2020-01-10 RX ORDER — PRAVASTATIN SODIUM 80 MG/1
80 TABLET ORAL DAILY
Qty: 90 TABLET | Refills: 0 | Status: SHIPPED | OUTPATIENT
Start: 2020-01-10 | End: 2020-04-17

## 2020-01-10 NOTE — TELEPHONE ENCOUNTER
Last Clinic Visit: 12/17/19, [place] 6/17/20, over due for LDL, 90 day nilay RX provided, routed to clinic to schedule LDL

## 2020-01-15 DIAGNOSIS — I21.4 NSTEMI (NON-ST ELEVATED MYOCARDIAL INFARCTION) (H): ICD-10-CM

## 2020-01-15 DIAGNOSIS — Z95.820 S/P ANGIOPLASTY WITH STENT: ICD-10-CM

## 2020-01-16 RX ORDER — POTASSIUM CHLORIDE 750 MG/1
TABLET, EXTENDED RELEASE ORAL
Qty: 270 TABLET | Refills: 3 | Status: SHIPPED | OUTPATIENT
Start: 2020-01-16 | End: 2021-01-05

## 2020-01-16 RX ORDER — CLOPIDOGREL BISULFATE 75 MG/1
75 TABLET ORAL DAILY
Qty: 90 TABLET | Refills: 3 | Status: SHIPPED | OUTPATIENT
Start: 2020-01-16 | End: 2021-01-22

## 2020-01-16 NOTE — TELEPHONE ENCOUNTER
Clopidogrel Bisulfate 75 MG Oral Tablet     Last Written Prescription Date:  7/17/2019  Last Fill Quantity: 90,   # refills: 1  Last Office Visit : 12/17/2019  Future Office visit:  6/17/2020    Routing refill request to provider for review/approval because:  Med failed protocol PPI on file, Refer to Provider for review.    Uma Pool RN  Central Triage Red Flags/Med Refills

## 2020-01-16 NOTE — TELEPHONE ENCOUNTER
Plavix : Pt is currently taking omeprazole 40 mg/day prescribed by cardiology. I reviewed cardiology note on 12/19/19 below. Therefore I will refill Plavix.    ' CAD s/p multiple PCIs as above (last to RPDA 7/23/13) and known 80% ISR of small mLCX, without angina:   -continue DAPT indefinitely  -continue statin '

## 2020-01-17 DIAGNOSIS — E87.6 HYPOKALEMIA: ICD-10-CM

## 2020-01-22 ENCOUNTER — OFFICE VISIT (OUTPATIENT)
Dept: CARDIOLOGY | Facility: CLINIC | Age: 85
End: 2020-01-22
Attending: NURSE PRACTITIONER
Payer: COMMERCIAL

## 2020-01-22 ENCOUNTER — ANCILLARY PROCEDURE (OUTPATIENT)
Dept: CARDIOLOGY | Facility: CLINIC | Age: 85
End: 2020-01-22
Payer: COMMERCIAL

## 2020-01-22 VITALS
DIASTOLIC BLOOD PRESSURE: 91 MMHG | HEIGHT: 67 IN | HEART RATE: 72 BPM | SYSTOLIC BLOOD PRESSURE: 175 MMHG | OXYGEN SATURATION: 95 % | BODY MASS INDEX: 26.63 KG/M2

## 2020-01-22 DIAGNOSIS — Z95.0 CARDIAC PACEMAKER IN SITU: ICD-10-CM

## 2020-01-22 DIAGNOSIS — Z95.0 CARDIAC PACEMAKER IN SITU: Primary | ICD-10-CM

## 2020-01-22 DIAGNOSIS — I25.10 CORONARY ARTERY DISEASE INVOLVING NATIVE HEART, ANGINA PRESENCE UNSPECIFIED, UNSPECIFIED VESSEL OR LESION TYPE: Primary | ICD-10-CM

## 2020-01-22 DIAGNOSIS — I49.5 SSS (SICK SINUS SYNDROME) (H): ICD-10-CM

## 2020-01-22 DIAGNOSIS — I49.5 SICK SINUS SYNDROME (H): ICD-10-CM

## 2020-01-22 PROCEDURE — G0463 HOSPITAL OUTPT CLINIC VISIT: HCPCS | Mod: 25,ZF

## 2020-01-22 PROCEDURE — 99214 OFFICE O/P EST MOD 30 MIN: CPT | Mod: 25 | Performed by: NURSE PRACTITIONER

## 2020-01-22 ASSESSMENT — PAIN SCALES - GENERAL: PAINLEVEL: NO PAIN (0)

## 2020-01-22 NOTE — PATIENT INSTRUCTIONS
It was a pleasure to see you in clinic today.  Please do not hesitate to call with any questions or concerns.  You are scheduled for a remote transmission on 4/22/20.  We look forward to seeing you in clinic at your next device check in 6 months.    Escobar Hines, RN  Electrophysiology Nurse Clinician  [facility]    During Business Hours Please Call:  [phone]  After Hours Please Call:  [phone] - select option #4 and ask for job code [id number]

## 2020-01-22 NOTE — NURSING NOTE
Chief Complaint   Patient presents with     Follow Up     Vitals were taken and medications reconciled.     Noé Gallegos CMA  10:35 AM

## 2020-01-22 NOTE — LETTER
1/22/2020      RE: Tessa Mansfield  [address]  [facility] [address]       Dear Colleague,    Thank you for the opportunity to participate in the care of your patient, Tessa Mansfield, at the [facility] at [facility]. Please see a copy of my visit note below.    Electrophysiology Clinic Note  HPI:   Ms. Mansfield is an 84 year old female who has a past medical history significant for MI extensive CAD s/p multiple PCIs (see below) last to rPDA 7/2013 on indefinite dual antiplatelet therapy, HTN, HLD, CKD, and transient CHB and SSS s/p PPM  2007 last generator change 10/2019. She presents today for follow up.     She has a longstanding extensive CAD history with numerous PCIs starting in 2002 and last in 2013. She has had stenting to all 3 major vessels. She had AMI with BMS to mRCA in 2002, PCI to mLCx and mLAD 2003, PCI pLAD, dRCA 2007, and PCI mPDA 2013. Her last cath in 2013 showed LMCA 40% distal. LAD: pLAD stents patent mLAD 30% ISR dLAD 50% diffuse, D2 diffuse disease. LCX 80% mid ISR. RCA diffuse <30% mid, RPLAs diffuse disease, RPDA 100% occlusion.  In 2007, she had intermittent CHB and SSS and had a dual chamber PPM placed. She had a generator change on 10/14/19. Last echo showed LVEF 60-65%, and moderate MR, moderate/severe TR.     She presents today for evaluation after her generator change. Device site well healed. She reports feeling at baseline. She does have some intermittent HIGGINS which is unchanged, and has some occasional lower extremity edema. She self titrates her Lasix dose as she has done for many years. She denies any chest pain/pressures, dizziness, lightheadedness,PND, orthopnea, palpitations, or syncopal symptoms. Device interrogation shows normal pacemaker function. No episodes recorded.  No arrhythmias recorded. Intrinsic rhythm = SB @ 52 bpm. AP = 80%.  = 0%. No short v-v intervals recorded. Lead impedance trends appear stable. Current  cardiac medications include: Norvasc, ASA, Plavix, Lasix, Hydralazine, Isosorbide, Metoprolol, and Pravastatin.       PAST MEDICAL HISTORY:  Past Medical History:   Diagnosis Date     CAD (coronary artery disease)      CAD s/p PCI+BMS to MRCA 10/2002, PCI to mLCX 2/2003, PCI+DESx2 to pLAD 11/2007, PCI+DESx1 to dRCA 12/2007, PCI+ALVAREZ to RPDA 7/2013; on indefinite DAPT  10/27/2002    10/27/2002: AMI s/p PCI+BMS (3.5x18mm Bx velocity) to mRCA 2/5/2003: Back pain; PCI+stent to mLCX c/b distal embolization/slow flow 4/11/2003: Unstable angina; PCI+stent (Hepacoat velocity) to mLAD 11/27/2007: PCI+DESx2 to pLAD (IVUS w/ calcification of LMCA and ulcerated plaque pLAD, 80% dRCA; also had 80% dLCX, too small to stent) 12/11/2007: Staged PCI. PCI+DESx1 (3.5x13mm Cypher) to dRCA (indefinite DAPT recommended at this time) 6/27/2008: Angina. mLCX stent 70% ISR. LAD and RCA stents patent. Myocardium at risk from LCX felt to be small, medical management preferred to PCI. 11/10/2009: Angina. Findings unchanged from 6/27/2008, FFR LAD 0.90. Medical management recommended. 7/23/2013: Unstable angina; PCI+ALVAREZ to mPDA. Diagnostic findings: LMCA 40% distal. LAD: pLAD stents patent mLAD 30% ISR dLAD 50% diffuse, D2 diffuse disease. LCX 80% mid ISR. RCA diffuse <30% mid, RPLAs diffuse disease, RPDA 100% occlusion.       Cardiac Pacemaker- Medtronic, dual chamber- NOT dependant 11/28/2007     CKD (chronic kidney disease), stage IV (H)      Hyperlipidemia LDL goal <70      Hypertension      Stented coronary artery 10-    RCA     Stented coronary artery 2-5-2003    LCx     Stented coronary artery 4-    LAD     Stented coronary artery 11-    LAD     Stented coronary artery 12-    RCA     Transient complete heart block (H) 11/28/2007   Per Dr. Santoyo's Note Summary of CAD history:  1.  10/27/2002: AMI s/p PCI+BMS (3.5x18mm Bx velocity) to mRCA  2. 2/5/2003: Back pain; PCI+stent to mLCX c/b distal  embolization/slow flow  3. 4/11/2003: Unstable angina; PCI+stent (Hepacoat velocity) to mLAD  4. 11/27/2007: PCI+DESx2 to pLAD (IVUS w/ calcification of LMCA and ulcerated plaque pLAD, 80% dRCA; also had 80% dLCX, too small to stent)  5. 12/11/2007: Staged PCI. PCI+DESx1 (3.5x13mm Cypher) to dRCA (indefinite DAPT recommended at this time)  6. 6/27/2008: Angina. mLCX stent 70% ISR. LAD and RCA stents patent. Myocardium at risk from LCX felt to be small, medical management preferred to PCI.  7. 11/10/2009: Angina. Findings unchanged from 6/27/2008, FFR LAD 0.90. Medical management recommended.  8. 7/23/2013: Unstable angina; PCI+ALVAREZ to mPDA. Diagnostic findings: LMCA 40% distal. LAD: pLAD stents patent mLAD 30% ISR dLAD 50% diffuse, D2 diffuse disease. LCX 80% mid ISR. RCA diffuse <30% mid, RPLAs diffuse disease, RPDA 100% occlusion.       CURRENT MEDICATIONS:  Current Outpatient Medications   Medication Sig Dispense Refill     allopurinol (ZYLOPRIM) 100 MG tablet Take 1 tablet (100 mg) by mouth daily 90 tablet 3     amLODIPine (NORVASC) 2.5 MG tablet Take 1 tablet (2.5 mg) by mouth daily 30 tablet 11     aspirin 81 MG tablet Take 1 tablet by mouth daily.       clopidogrel (PLAVIX) 75 MG tablet Take 1 tablet (75 mg) by mouth daily 90 tablet 3     cyanocolbalamin (VITAMIN B-12) 1000 MCG tablet Take 500 mcg by mouth daily As directed       furosemide (LASIX) 40 MG tablet Take 1 tablet (40 mg) by mouth 2 times daily 60 tablet 11     hydrALAZINE (APRESOLINE) 10 MG tablet Take 1 tablet (10 mg) by mouth 3 times daily 90 tablet 1     isosorbide mononitrate (ISMO/MONOKET) 20 MG tablet Take 2 tablets (40 mg) by mouth 2 times daily 540 tablet 0     metoprolol tartrate (LOPRESSOR) 100 MG tablet Take 1 tablet (100 mg) by mouth 2 times daily 180 tablet 3     Multiple Vitamins-Minerals (PRESERVISION AREDS 2+MULTI VIT PO) Take by mouth 2 times daily       NITROSTAT 0.4 MG sublingual tablet DISSOLVE ONE TABLET UNDER THE TONGUE EVERY  "5 MINUTES AS NEEDED FOR CHEST PAIN.  DO NOT EXCEED A TOTAL OF 3 DOSES IN 15 MINUTES. CALL 911. 25 tablet 3     omeprazole (PRILOSEC) 40 MG DR capsule TAKE ONE CAPSULE BY MOUTH ONCE DAILY 90 capsule 3     potassium chloride ER (K-TAB/KLOR-CON) 10 MEQ CR tablet TAKE TWO TABLETS BY MOUTH DAILY IN THE MORNING AND ONE TABLET IN THE EVENING 270 tablet 3     pravastatin (PRAVACHOL) 80 MG tablet Take 1 tablet (80 mg) by mouth daily Needs lab 90 tablet 0     timolol (TIMOPTIC) 0.5 % ophthalmic solution Place 1 drop into both eyes daily        Vitamin D3 (CHOLECALCIFEROL) 25 mcg (1000 units) tablet Take 1 tablet (25 mcg) by mouth daily 30 tablet 11       PAST SURGICAL HISTORY:  Past Surgical History:   Procedure Laterality Date     CHOLECYSTECTOMY       EP PACEMAKER N/A 10/15/2019    Procedure: EP PACEMAKER;  Surgeon: Anthony Zhu MD;  Location:  HEART CARDI[facility] CATH LAB     HC PPM INSERTION NEW/REPLACEMENT W/ ATRIAL&VENTRICULAR LEAD  11-     HYSTERECTOMY         ALLERGIES:     Allergies   Allergen Reactions     Bactrim [Sulfamethoxazole W/Trimethoprim] Other (See Comments)     Patient unable to recall     Biaxin [Clarithromycin]      Chlorthalidone Nausea and Vomiting     Clonidine      Codeine Sulfate Itching     Darvon [Propoxyphene Hcl]      Dilaudid [Hydromorphone] Visual Disturbance     Hallucinations       Gabapentin Other (See Comments) and Fatigue     \"felt drunk\"     Indocin      Levaquin [Levofloxacin Hemihydrate]      Lyrica Fatigue     Morphine Sulfate      Percocet [Oxycodone-Acetaminophen] Other (See Comments)     hallucinations     Pregabalin Itching     Other reaction(s): Fatigue     Shrimp Swelling     Spironolactone Other (See Comments)     Dehydrated       Terazosin      Ciprofloxacin Rash     Simvastatin Palpitations     Muscle weakness, leg cramping         FAMILY HISTORY:  Family History   Problem Relation Age of Onset     Cancer Sister 82        bladder cancer       SOCIAL " "HISTORY:  Social History     Tobacco Use     Smoking status: Former Smoker     Packs/day: 0.30     Years: 15.00     Pack years: 4.50     Types: Cigarettes     Smokeless tobacco: Never Used     Tobacco comment: Quit 22+ years ago   Substance Use Topics     Alcohol use: Not on file     Drug use: No       ROS:   A comprehensive 10 point review of systems negative other than as mentioned in HPI.  Exam:  BP (!) 175/91 (BP Location: Right arm, Patient Position: Chair, Cuff Size: Adult Regular)   Pulse 72   Ht 1.702 m (5' 7\")   SpO2 95%   BMI 26.63 kg/m     GENERAL APPEARANCE: alert and no distress  HEENT: no icterus, no xanthelasmas, normal pupil size and reaction, normal palate, mucosa moist, no central cyanosis  NECK: supple.   RESPIRATORY: lungs clear to auscultation - no rales, rhonchi or wheezes, no use of accessory muscles, no retractions, respirations are unlabored, normal respiratory rate  CARDIOVASCULAR: regular rhythm, S1/S2, murmur present.  ABDOMEN: soft, non tender, bowel sounds normal, non-distended  EXTREMITIES: peripheral pulses normal, traceedema  NEURO: alert and oriented to person/place/time, normal speech, gait and affect  SKIN: no ecchymoses, no rashes  PSYCH: normal affect, cooperative    Labs:  Reviewed.     Testing/Procedures:  10/15/19 ECHOCARDIOGRAM:   Interpretation Summary  Global and regional left ventricular function is normal with an EF of 60-65%.  Right ventricular function is mildly reduced with mild dilation.  Moderate to severe tricuspid and mitral insufficiency present.  Estimated pulmonary artery systolic pressure is 39 mmHg (upper limits of  normal).  Dilation of the inferior vena cava is present with normal respiratory  variation in diameter.  No pericardial effusion is present.     Compared to 07/24/2013 there is now moderate to severe mitral and tricuspid  regurgitation.    Assessment and Plan:   Ms. Mansfield is an 84 year old female who has a past medical history significant for " MI extensive CAD s/p multiple PCIs (see below) last to rPDA 7/2013 on indefinite dual antiplatelet therapy, HTN, HLD, CKD, and transient CHB and SSS s/p PPM  2007 last generator change 10/2019. She presents today for evaluation after her generator change. Device site well healed. She reports feeling at baseline. She does have some intermittent HIGGINS which is unchanged, and has some occasional lower extremity edema. She self titrates her Lasix dose as she has done for many years. She denies any chest pain/pressures, dizziness, lightheadedness,PND, orthopnea, palpitations, or syncopal symptoms. Device interrogation shows normal pacemaker function. No episodes recorded.  No arrhythmias recorded. Intrinsic rhythm = SB @ 52 bpm. AP = 80%.  = 0%. No short v-v intervals recorded. Lead impedance trends appear stable. Current cardiac medications include: Norvasc, ASA, Plavix, Lasix, Hydralazine, Isosorbide, Metoprolol, and Pravastatin.     CAD:  1. Antiplatlet: ASA and Plavix.   2. Statin: Continue Pravastatin  3. BB:  Continue Metoprolol   4. On isosorbide for long acting and then short acting Nitroglycerin 0.4 mg PRN for chest pain. Place 1 tablet (0.4 mg) under the tongue every 5 minutes as needed for chest pain. Maximum of 3 doses in 15 minutes.  6. Lifestyle: Avoid tobacco, maintain a healthy weight, limit alcohol, cardiac diet, and exercise.    SSS s/p dual chamber PPM 2007 last gen change 10/14/19:  1. Normal device function and stable lead parameters  2. Device site well healed after recent gen change  3. Continued follow up with device clinic per routine    She will continue to follow with Dr. Santoyo and can follow up with EP on an as needed basis.       The patient states understanding and is agreeable with plan.    Hilary Meyers, JI CNP  Pager: [phone]    DEMETRICE MONTERO

## 2020-01-22 NOTE — PROGRESS NOTES
Electrophysiology Clinic Note  HPI:   Ms. Mansfield is an 84 year old female who has a past medical history significant for MI extensive CAD s/p multiple PCIs (see below) last to rPDA 7/2013 on indefinite dual antiplatelet therapy, HTN, HLD, CKD, and transient CHB and SSS s/p PPM  2007 last generator change 10/2019. She presents today for follow up.     She has a longstanding extensive CAD history with numerous PCIs starting in 2002 and last in 2013. She has had stenting to all 3 major vessels. She had AMI with BMS to mRCA in 2002, PCI to mLCx and mLAD 2003, PCI pLAD, dRCA 2007, and PCI mPDA 2013. Her last cath in 2013 showed LMCA 40% distal. LAD: pLAD stents patent mLAD 30% ISR dLAD 50% diffuse, D2 diffuse disease. LCX 80% mid ISR. RCA diffuse <30% mid, RPLAs diffuse disease, RPDA 100% occlusion.  In 2007, she had intermittent CHB and SSS and had a dual chamber PPM placed. She had a generator change on 10/14/19. Last echo showed LVEF 60-65%, and moderate MR, moderate/severe TR.     She presents today for evaluation after her generator change. Device site well healed. She reports feeling at baseline. She does have some intermittent HIGGINS which is unchanged, and has some occasional lower extremity edema. She self titrates her Lasix dose as she has done for many years. She denies any chest pain/pressures, dizziness, lightheadedness,PND, orthopnea, palpitations, or syncopal symptoms. Device interrogation shows normal pacemaker function. No episodes recorded.  No arrhythmias recorded. Intrinsic rhythm = SB @ 52 bpm. AP = 80%.  = 0%. No short v-v intervals recorded. Lead impedance trends appear stable. Current cardiac medications include: Norvasc, ASA, Plavix, Lasix, Hydralazine, Isosorbide, Metoprolol, and Pravastatin.       PAST MEDICAL HISTORY:  Past Medical History:   Diagnosis Date     CAD (coronary artery disease)      CAD s/p PCI+BMS to MRCA 10/2002, PCI to mLCX 2/2003, PCI+DESx2 to pLAD 11/2007, PCI+DESx1 to dRCA  12/2007, PCI+ALVAREZ to RPDA 7/2013; on indefinite DAPT  10/27/2002    10/27/2002: AMI s/p PCI+BMS (3.5x18mm Bx velocity) to mRCA 2/5/2003: Back pain; PCI+stent to mLCX c/b distal embolization/slow flow 4/11/2003: Unstable angina; PCI+stent (Hepacoat velocity) to mLAD 11/27/2007: PCI+DESx2 to pLAD (IVUS w/ calcification of LMCA and ulcerated plaque pLAD, 80% dRCA; also had 80% dLCX, too small to stent) 12/11/2007: Staged PCI. PCI+DESx1 (3.5x13mm Cypher) to dRCA (indefinite DAPT recommended at this time) 6/27/2008: Angina. mLCX stent 70% ISR. LAD and RCA stents patent. Myocardium at risk from LCX felt to be small, medical management preferred to PCI. 11/10/2009: Angina. Findings unchanged from 6/27/2008, FFR LAD 0.90. Medical management recommended. 7/23/2013: Unstable angina; PCI+ALVAREZ to mPDA. Diagnostic findings: LMCA 40% distal. LAD: pLAD stents patent mLAD 30% ISR dLAD 50% diffuse, D2 diffuse disease. LCX 80% mid ISR. RCA diffuse <30% mid, RPLAs diffuse disease, RPDA 100% occlusion.       Cardiac Pacemaker- Medtronic, dual chamber- NOT dependant 11/28/2007     CKD (chronic kidney disease), stage IV (H)      Hyperlipidemia LDL goal <70      Hypertension      Stented coronary artery 10-    RCA     Stented coronary artery 2-5-2003    LCx     Stented coronary artery 4-    LAD     Stented coronary artery 11-    LAD     Stented coronary artery 12-    RCA     Transient complete heart block (H) 11/28/2007   Per Dr. Santoyo's Note Summary of CAD history:  1.  10/27/2002: AMI s/p PCI+BMS (3.5x18mm Bx velocity) to mRCA  2. 2/5/2003: Back pain; PCI+stent to mLCX c/b distal embolization/slow flow  3. 4/11/2003: Unstable angina; PCI+stent (Hepacoat velocity) to mLAD  4. 11/27/2007: PCI+DESx2 to pLAD (IVUS w/ calcification of LMCA and ulcerated plaque pLAD, 80% dRCA; also had 80% dLCX, too small to stent)  5. 12/11/2007: Staged PCI. PCI+DESx1 (3.5x13mm Cypher) to dRCA (indefinite DAPT recommended at  this time)  6. 6/27/2008: Angina. mLCX stent 70% ISR. LAD and RCA stents patent. Myocardium at risk from LCX felt to be small, medical management preferred to PCI.  7. 11/10/2009: Angina. Findings unchanged from 6/27/2008, FFR LAD 0.90. Medical management recommended.  8. 7/23/2013: Unstable angina; PCI+ALVAREZ to mPDA. Diagnostic findings: LMCA 40% distal. LAD: pLAD stents patent mLAD 30% ISR dLAD 50% diffuse, D2 diffuse disease. LCX 80% mid ISR. RCA diffuse <30% mid, RPLAs diffuse disease, RPDA 100% occlusion.       CURRENT MEDICATIONS:  Current Outpatient Medications   Medication Sig Dispense Refill     allopurinol (ZYLOPRIM) 100 MG tablet Take 1 tablet (100 mg) by mouth daily 90 tablet 3     amLODIPine (NORVASC) 2.5 MG tablet Take 1 tablet (2.5 mg) by mouth daily 30 tablet 11     aspirin 81 MG tablet Take 1 tablet by mouth daily.       clopidogrel (PLAVIX) 75 MG tablet Take 1 tablet (75 mg) by mouth daily 90 tablet 3     cyanocolbalamin (VITAMIN B-12) 1000 MCG tablet Take 500 mcg by mouth daily As directed       furosemide (LASIX) 40 MG tablet Take 1 tablet (40 mg) by mouth 2 times daily 60 tablet 11     hydrALAZINE (APRESOLINE) 10 MG tablet Take 1 tablet (10 mg) by mouth 3 times daily 90 tablet 1     isosorbide mononitrate (ISMO/MONOKET) 20 MG tablet Take 2 tablets (40 mg) by mouth 2 times daily 540 tablet 0     metoprolol tartrate (LOPRESSOR) 100 MG tablet Take 1 tablet (100 mg) by mouth 2 times daily 180 tablet 3     Multiple Vitamins-Minerals (PRESERVISION AREDS 2+MULTI VIT PO) Take by mouth 2 times daily       NITROSTAT 0.4 MG sublingual tablet DISSOLVE ONE TABLET UNDER THE TONGUE EVERY 5 MINUTES AS NEEDED FOR CHEST PAIN.  DO NOT EXCEED A TOTAL OF 3 DOSES IN 15 MINUTES. CALL 911. 25 tablet 3     omeprazole (PRILOSEC) 40 MG DR capsule TAKE ONE CAPSULE BY MOUTH ONCE DAILY 90 capsule 3     potassium chloride ER (K-TAB/KLOR-CON) 10 MEQ CR tablet TAKE TWO TABLETS BY MOUTH DAILY IN THE MORNING AND ONE TABLET IN THE  "EVENING 270 tablet 3     pravastatin (PRAVACHOL) 80 MG tablet Take 1 tablet (80 mg) by mouth daily Needs lab 90 tablet 0     timolol (TIMOPTIC) 0.5 % ophthalmic solution Place 1 drop into both eyes daily        Vitamin D3 (CHOLECALCIFEROL) 25 mcg (1000 units) tablet Take 1 tablet (25 mcg) by mouth daily 30 tablet 11       PAST SURGICAL HISTORY:  Past Surgical History:   Procedure Laterality Date     CHOLECYSTECTOMY       EP PACEMAKER N/A 10/15/2019    Procedure: EP PACEMAKER;  Surgeon: Anthony Zhu MD;  Location:  [facility]     HC PPM INSERTION NEW/REPLACEMENT W/ ATRIAL&VENTRICULAR LEAD  11-     HYSTERECTOMY         ALLERGIES:     Allergies   Allergen Reactions     Bactrim [Sulfamethoxazole W/Trimethoprim] Other (See Comments)     Patient unable to recall     Biaxin [Clarithromycin]      Chlorthalidone Nausea and Vomiting     Clonidine      Codeine Sulfate Itching     Darvon [Propoxyphene Hcl]      Dilaudid [Hydromorphone] Visual Disturbance     Hallucinations       Gabapentin Other (See Comments) and Fatigue     \"felt drunk\"     Indocin      Levaquin [Levofloxacin Hemihydrate]      Lyrica Fatigue     Morphine Sulfate      Percocet [Oxycodone-Acetaminophen] Other (See Comments)     hallucinations     Pregabalin Itching     Other reaction(s): Fatigue     Shrimp Swelling     Spironolactone Other (See Comments)     Dehydrated       Terazosin      Ciprofloxacin Rash     Simvastatin Palpitations     Muscle weakness, leg cramping         FAMILY HISTORY:  Family History   Problem Relation Age of Onset     Cancer Sister 82        bladder cancer       SOCIAL HISTORY:  Social History     Tobacco Use     Smoking status: Former Smoker     Packs/day: 0.30     Years: 15.00     Pack years: 4.50     Types: Cigarettes     Smokeless tobacco: Never Used     Tobacco comment: Quit 22+ years ago   Substance Use Topics     Alcohol use: Not on file     Drug use: No       ROS:   A comprehensive 10 point review " "of systems negative other than as mentioned in HPI.  Exam:  BP (!) 175/91 (BP Location: Right arm, Patient Position: Chair, Cuff Size: Adult Regular)   Pulse 72   Ht 1.702 m (5' 7\")   SpO2 95%   BMI 26.63 kg/m    GENERAL APPEARANCE: alert and no distress  HEENT: no icterus, no xanthelasmas, normal pupil size and reaction, normal palate, mucosa moist, no central cyanosis  NECK: supple.   RESPIRATORY: lungs clear to auscultation - no rales, rhonchi or wheezes, no use of accessory muscles, no retractions, respirations are unlabored, normal respiratory rate  CARDIOVASCULAR: regular rhythm, S1/S2, murmur present.  ABDOMEN: soft, non tender, bowel sounds normal, non-distended  EXTREMITIES: peripheral pulses normal, traceedema  NEURO: alert and oriented to person/place/time, normal speech, gait and affect  SKIN: no ecchymoses, no rashes  PSYCH: normal affect, cooperative    Labs:  Reviewed.     Testing/Procedures:  10/15/19 ECHOCARDIOGRAM:   Interpretation Summary  Global and regional left ventricular function is normal with an EF of 60-65%.  Right ventricular function is mildly reduced with mild dilation.  Moderate to severe tricuspid and mitral insufficiency present.  Estimated pulmonary artery systolic pressure is 39 mmHg (upper limits of  normal).  Dilation of the inferior vena cava is present with normal respiratory  variation in diameter.  No pericardial effusion is present.     Compared to 07/24/2013 there is now moderate to severe mitral and tricuspid  regurgitation.    Assessment and Plan:   Ms. Mansfield is an 84 year old female who has a past medical history significant for MI extensive CAD s/p multiple PCIs (see below) last to rPDA 7/2013 on indefinite dual antiplatelet therapy, HTN, HLD, CKD, and transient CHB and SSS s/p PPM  2007 last generator change 10/2019. She presents today for evaluation after her generator change. Device site well healed. She reports feeling at baseline. She does have some " intermittent HIGGINS which is unchanged, and has some occasional lower extremity edema. She self titrates her Lasix dose as she has done for many years. She denies any chest pain/pressures, dizziness, lightheadedness,PND, orthopnea, palpitations, or syncopal symptoms. Device interrogation shows normal pacemaker function. No episodes recorded.  No arrhythmias recorded. Intrinsic rhythm = SB @ 52 bpm. AP = 80%.  = 0%. No short v-v intervals recorded. Lead impedance trends appear stable. Current cardiac medications include: Norvasc, ASA, Plavix, Lasix, Hydralazine, Isosorbide, Metoprolol, and Pravastatin.     CAD:  1. Antiplatlet: ASA and Plavix.   2. Statin: Continue Pravastatin  3. BB:  Continue Metoprolol   4. On isosorbide for long acting and then short acting Nitroglycerin 0.4 mg PRN for chest pain. Place 1 tablet (0.4 mg) under the tongue every 5 minutes as needed for chest pain. Maximum of 3 doses in 15 minutes.  6. Lifestyle: Avoid tobacco, maintain a healthy weight, limit alcohol, cardiac diet, and exercise.    SSS s/p dual chamber PPM 2007 last gen change 10/14/19:  1. Normal device function and stable lead parameters  2. Device site well healed after recent gen change  3. Continued follow up with device clinic per routine    She will continue to follow with Dr. Santoyo and can follow up with EP on an as needed basis.       The patient states understanding and is agreeable with plan.    Hilary Meyers, JI CNP  Pager: [phone]  [facility]

## 2020-01-25 LAB
MDC_IDC_LEAD_IMPLANT_DT: NORMAL
MDC_IDC_LEAD_IMPLANT_DT: NORMAL
MDC_IDC_LEAD_LOCATION: NORMAL
MDC_IDC_LEAD_LOCATION: NORMAL
MDC_IDC_LEAD_LOCATION_DETAIL_1: NORMAL
MDC_IDC_LEAD_LOCATION_DETAIL_1: NORMAL
MDC_IDC_LEAD_MFG: NORMAL
MDC_IDC_LEAD_MFG: NORMAL
MDC_IDC_LEAD_MODEL: NORMAL
MDC_IDC_LEAD_MODEL: NORMAL
MDC_IDC_LEAD_POLARITY_TYPE: NORMAL
MDC_IDC_LEAD_POLARITY_TYPE: NORMAL
MDC_IDC_LEAD_SERIAL: NORMAL
MDC_IDC_LEAD_SERIAL: NORMAL
MDC_IDC_LEAD_SPECIAL_FUNCTION: NORMAL
MDC_IDC_LEAD_SPECIAL_FUNCTION: NORMAL
MDC_IDC_MSMT_BATTERY_DTM: NORMAL
MDC_IDC_MSMT_BATTERY_REMAINING_LONGEVITY: 161 MO
MDC_IDC_MSMT_BATTERY_RRT_TRIGGER: 2.62
MDC_IDC_MSMT_BATTERY_STATUS: NORMAL
MDC_IDC_MSMT_BATTERY_VOLTAGE: 3.19 V
MDC_IDC_MSMT_LEADCHNL_RA_IMPEDANCE_VALUE: 323 OHM
MDC_IDC_MSMT_LEADCHNL_RA_IMPEDANCE_VALUE: 380 OHM
MDC_IDC_MSMT_LEADCHNL_RA_PACING_THRESHOLD_AMPLITUDE: 0.5 V
MDC_IDC_MSMT_LEADCHNL_RA_PACING_THRESHOLD_PULSEWIDTH: 0.4 MS
MDC_IDC_MSMT_LEADCHNL_RA_SENSING_INTR_AMPL: 2.88 MV
MDC_IDC_MSMT_LEADCHNL_RV_IMPEDANCE_VALUE: 418 OHM
MDC_IDC_MSMT_LEADCHNL_RV_IMPEDANCE_VALUE: 475 OHM
MDC_IDC_MSMT_LEADCHNL_RV_PACING_THRESHOLD_AMPLITUDE: 1 V
MDC_IDC_MSMT_LEADCHNL_RV_PACING_THRESHOLD_PULSEWIDTH: 0.4 MS
MDC_IDC_MSMT_LEADCHNL_RV_SENSING_INTR_AMPL: 12.9 MV
MDC_IDC_PG_IMPLANT_DTM: NORMAL
MDC_IDC_PG_MFG: NORMAL
MDC_IDC_PG_MODEL: NORMAL
MDC_IDC_PG_SERIAL: NORMAL
MDC_IDC_PG_TYPE: NORMAL
MDC_IDC_SESS_CLINIC_NAME: NORMAL
MDC_IDC_SESS_DTM: NORMAL
MDC_IDC_SESS_TYPE: NORMAL
MDC_IDC_SET_BRADY_AT_MODE_SWITCH_RATE: 171 {BEATS}/MIN
MDC_IDC_SET_BRADY_HYSTRATE: NORMAL
MDC_IDC_SET_BRADY_LOWRATE: 60 {BEATS}/MIN
MDC_IDC_SET_BRADY_MAX_SENSOR_RATE: 130 {BEATS}/MIN
MDC_IDC_SET_BRADY_MAX_TRACKING_RATE: 130 {BEATS}/MIN
MDC_IDC_SET_BRADY_MODE: NORMAL
MDC_IDC_SET_BRADY_PAV_DELAY_LOW: 180 MS
MDC_IDC_SET_BRADY_SAV_DELAY_LOW: 150 MS
MDC_IDC_SET_LEADCHNL_RA_PACING_AMPLITUDE: 1.5 V
MDC_IDC_SET_LEADCHNL_RA_PACING_ANODE_ELECTRODE_1: NORMAL
MDC_IDC_SET_LEADCHNL_RA_PACING_ANODE_LOCATION_1: NORMAL
MDC_IDC_SET_LEADCHNL_RA_PACING_CAPTURE_MODE: NORMAL
MDC_IDC_SET_LEADCHNL_RA_PACING_CATHODE_ELECTRODE_1: NORMAL
MDC_IDC_SET_LEADCHNL_RA_PACING_CATHODE_LOCATION_1: NORMAL
MDC_IDC_SET_LEADCHNL_RA_PACING_POLARITY: NORMAL
MDC_IDC_SET_LEADCHNL_RA_PACING_PULSEWIDTH: 0.4 MS
MDC_IDC_SET_LEADCHNL_RA_SENSING_ANODE_ELECTRODE_1: NORMAL
MDC_IDC_SET_LEADCHNL_RA_SENSING_ANODE_LOCATION_1: NORMAL
MDC_IDC_SET_LEADCHNL_RA_SENSING_CATHODE_ELECTRODE_1: NORMAL
MDC_IDC_SET_LEADCHNL_RA_SENSING_CATHODE_LOCATION_1: NORMAL
MDC_IDC_SET_LEADCHNL_RA_SENSING_POLARITY: NORMAL
MDC_IDC_SET_LEADCHNL_RA_SENSING_SENSITIVITY: 0.3 MV
MDC_IDC_SET_LEADCHNL_RV_PACING_AMPLITUDE: 2 V
MDC_IDC_SET_LEADCHNL_RV_PACING_ANODE_ELECTRODE_1: NORMAL
MDC_IDC_SET_LEADCHNL_RV_PACING_ANODE_LOCATION_1: NORMAL
MDC_IDC_SET_LEADCHNL_RV_PACING_CAPTURE_MODE: NORMAL
MDC_IDC_SET_LEADCHNL_RV_PACING_CATHODE_ELECTRODE_1: NORMAL
MDC_IDC_SET_LEADCHNL_RV_PACING_CATHODE_LOCATION_1: NORMAL
MDC_IDC_SET_LEADCHNL_RV_PACING_POLARITY: NORMAL
MDC_IDC_SET_LEADCHNL_RV_PACING_PULSEWIDTH: 0.4 MS
MDC_IDC_SET_LEADCHNL_RV_SENSING_ANODE_ELECTRODE_1: NORMAL
MDC_IDC_SET_LEADCHNL_RV_SENSING_ANODE_LOCATION_1: NORMAL
MDC_IDC_SET_LEADCHNL_RV_SENSING_CATHODE_ELECTRODE_1: NORMAL
MDC_IDC_SET_LEADCHNL_RV_SENSING_CATHODE_LOCATION_1: NORMAL
MDC_IDC_SET_LEADCHNL_RV_SENSING_POLARITY: NORMAL
MDC_IDC_SET_LEADCHNL_RV_SENSING_SENSITIVITY: 0.9 MV
MDC_IDC_SET_ZONE_DETECTION_INTERVAL: 350 MS
MDC_IDC_SET_ZONE_DETECTION_INTERVAL: 400 MS
MDC_IDC_SET_ZONE_TYPE: NORMAL
MDC_IDC_STAT_BRADY_AP_VP_PERCENT: 0.03 %
MDC_IDC_STAT_BRADY_AP_VS_PERCENT: 79.52 %
MDC_IDC_STAT_BRADY_AS_VP_PERCENT: 0.02 %
MDC_IDC_STAT_BRADY_AS_VS_PERCENT: 20.44 %
MDC_IDC_STAT_BRADY_DTM_END: NORMAL
MDC_IDC_STAT_BRADY_DTM_START: NORMAL
MDC_IDC_STAT_BRADY_RA_PERCENT_PACED: 79.73 %
MDC_IDC_STAT_BRADY_RV_PERCENT_PACED: 0.04 %
MDC_IDC_STAT_EPISODE_RECENT_COUNT: 0
MDC_IDC_STAT_EPISODE_RECENT_COUNT_DTM_END: NORMAL
MDC_IDC_STAT_EPISODE_RECENT_COUNT_DTM_START: NORMAL
MDC_IDC_STAT_EPISODE_TOTAL_COUNT: 0
MDC_IDC_STAT_EPISODE_TOTAL_COUNT_DTM_END: NORMAL
MDC_IDC_STAT_EPISODE_TOTAL_COUNT_DTM_START: NORMAL
MDC_IDC_STAT_EPISODE_TYPE: NORMAL

## 2020-02-04 ENCOUNTER — CARE COORDINATION (OUTPATIENT)
Dept: NEPHROLOGY | Facility: CLINIC | Age: 85
End: 2020-02-04

## 2020-02-04 NOTE — PROGRESS NOTES
Nephrology Note: Nursing Outreach Encounter    REASON FOR CALL:                                                      REASON FOR CALL: Blood Pressure Follow Up                                          SITUATION/BACKROUND:                                                    Patient is being treated for HTN.      ASSESSMENT:                                                      Checked in with Tessa. Her BP has been between 111/73 to 140/82. Systolic is mainly in the 130-140's. Pulse is in the 70-80 range. Her edema is improving with the lasix, denied any other symptoms. She will repeat labs today.    Currently taking lasix 40mg BID, imdur 40mg BID, metoprolol 100mg BID, and amlodipine 2.5mg daily. She is no longer on hydralazine (10mg TID).    Uremic Symptoms: Yes,  Edema: Yes;       PLAN:                                                      Follow Up:   Route to provider to further advise     Per Dr. Martin: Looks OK for now     Patient verbalized understanding and will contact the clinic with any further questions or concerns.     Christy Roblero RN

## 2020-02-11 ENCOUNTER — TELEPHONE (OUTPATIENT)
Dept: NEPHROLOGY | Facility: CLINIC | Age: 85
End: 2020-02-11

## 2020-02-11 ENCOUNTER — APPOINTMENT (OUTPATIENT)
Dept: LAB | Facility: CLINIC | Age: 85
End: 2020-02-11
Payer: COMMERCIAL

## 2020-02-11 DIAGNOSIS — N18.4 CKD (CHRONIC KIDNEY DISEASE) STAGE 4, GFR 15-29 ML/MIN (H): Primary | ICD-10-CM

## 2020-02-11 NOTE — TELEPHONE ENCOUNTER
Per Dr. Martin, renal panel is fine. Order has been placed. Patient is aware.  Constanza Throne LPN  Nephrology  [phone]

## 2020-02-11 NOTE — TELEPHONE ENCOUNTER
[facility] Call Center    Phone Message    May a detailed message be left on voicemail: yes     Reason for Call: Order(s): Other:   Reason for requested: Lab orders  Date needed: Today 2/11/2020  Provider name: Dr. Martin    Patient went to [facility] ([address] [place] [phone]) and they stated that lab orders were not in. Patient would like a call back once orders are placed.  Thank you, Lucia Trammell on 2/11/2020 at 9:00 AM       Action Taken: Message routed to:  [facility] ([facility]): NEPHROLOGY    Travel Screening: Not Applicable

## 2020-02-13 DIAGNOSIS — N18.4 CKD (CHRONIC KIDNEY DISEASE) STAGE 4, GFR 15-29 ML/MIN (H): ICD-10-CM

## 2020-02-13 PROCEDURE — 80069 RENAL FUNCTION PANEL: CPT | Performed by: INTERNAL MEDICINE

## 2020-02-13 PROCEDURE — 36415 COLL VENOUS BLD VENIPUNCTURE: CPT | Performed by: INTERNAL MEDICINE

## 2020-02-14 LAB
ALBUMIN SERPL-MCNC: 3.6 G/DL (ref 3.4–5)
ANION GAP SERPL CALCULATED.3IONS-SCNC: 6 MMOL/L (ref 3–14)
BUN SERPL-MCNC: 43 MG/DL (ref 7–30)
CALCIUM SERPL-MCNC: 8.6 MG/DL (ref 8.5–10.1)
CHLORIDE SERPL-SCNC: 105 MMOL/L (ref 94–109)
CO2 SERPL-SCNC: 26 MMOL/L (ref 20–32)
CREAT SERPL-MCNC: 1.9 MG/DL (ref 0.52–1.04)
GFR SERPL CREATININE-BSD FRML MDRD: 24 ML/MIN/{1.73_M2}
GLUCOSE SERPL-MCNC: 110 MG/DL (ref 70–99)
PHOSPHATE SERPL-MCNC: 3.8 MG/DL (ref 2.5–4.5)
POTASSIUM SERPL-SCNC: 4.5 MMOL/L (ref 3.4–5.3)
SODIUM SERPL-SCNC: 137 MMOL/L (ref 133–144)

## 2020-02-25 ENCOUNTER — OFFICE VISIT (OUTPATIENT)
Dept: URGENT CARE | Facility: URGENT CARE | Age: 85
End: 2020-02-25
Payer: COMMERCIAL

## 2020-02-25 VITALS
WEIGHT: 165.8 LBS | RESPIRATION RATE: 14 BRPM | DIASTOLIC BLOOD PRESSURE: 79 MMHG | HEART RATE: 89 BPM | OXYGEN SATURATION: 98 % | SYSTOLIC BLOOD PRESSURE: 154 MMHG | TEMPERATURE: 98.1 F | BODY MASS INDEX: 25.97 KG/M2

## 2020-02-25 DIAGNOSIS — J06.9 UPPER RESPIRATORY TRACT INFECTION, UNSPECIFIED TYPE: Primary | ICD-10-CM

## 2020-02-25 PROCEDURE — 99203 OFFICE O/P NEW LOW 30 MIN: CPT | Performed by: PHYSICIAN ASSISTANT

## 2020-02-25 ASSESSMENT — ENCOUNTER SYMPTOMS
CARDIOVASCULAR NEGATIVE: 1
MUSCULOSKELETAL NEGATIVE: 1
CONSTITUTIONAL NEGATIVE: 1
COUGH: 1
FEVER: 0
EYES NEGATIVE: 1
SORE THROAT: 1

## 2020-02-25 NOTE — PROGRESS NOTES
SUBJECTIVE:   Tessa Mansfield is a 84 year old female presenting with a chief complaint of   Chief Complaint   Patient presents with     Sinus Problem     Cold x1.5 weeks, blood when blowing nose x3 days       She is an established patient of [facility].  Patient is presenting for evaluation of below.  Her  is having surgery and wants to be sure she isn't contagious to him.  No fevers, HA or ear pain.  URI Adult    Onset of symptoms was 10 day(s) ago with URI symptoms, ST and blood tinged mucus x 2.    Course of illness is waxing and waning.    Severity moderate  Current and Associated symptoms: stuffy nose, cough - non-productive and sore throat  Treatment measures tried include Tylenol/Ibuprofen and OTC Cough med.  Predisposing factors include None.        Review of Systems   Constitutional: Negative.  Negative for fever.   HENT: Positive for congestion and sore throat.    Eyes: Negative.    Respiratory: Positive for cough.    Cardiovascular: Negative.    Genitourinary: Negative.    Musculoskeletal: Negative.    Skin: Negative.    All other systems reviewed and are negative.      Past Medical History:   Diagnosis Date     CAD (coronary artery disease)      CAD s/p PCI+BMS to MRCA 10/2002, PCI to mLCX 2/2003, PCI+DESx2 to pLAD 11/2007, PCI+DESx1 to dRCA 12/2007, PCI+ALVAREZ to RPDA 7/2013; on indefinite DAPT  10/27/2002    10/27/2002: AMI s/p PCI+BMS (3.5x18mm Bx velocity) to mRCA 2/5/2003: Back pain; PCI+stent to mLCX c/b distal embolization/slow flow 4/11/2003: Unstable angina; PCI+stent (Hepacoat velocity) to mLAD 11/27/2007: PCI+DESx2 to pLAD (IVUS w/ calcification of LMCA and ulcerated plaque pLAD, 80% dRCA; also had 80% dLCX, too small to stent) 12/11/2007: Staged PCI. PCI+DESx1 (3.5x13mm Cypher) to dRCA (indefinite DAPT recommended at this time) 6/27/2008: Angina. mLCX stent 70% ISR. LAD and RCA stents patent. Myocardium at risk from LCX felt to be small, medical management preferred to PCI. 11/10/2009:  Angina. Findings unchanged from 6/27/2008, FFR LAD 0.90. Medical management recommended. 7/23/2013: Unstable angina; PCI+ALVAREZ to mPDA. Diagnostic findings: LMCA 40% distal. LAD: pLAD stents patent mLAD 30% ISR dLAD 50% diffuse, D2 diffuse disease. LCX 80% mid ISR. RCA diffuse <30% mid, RPLAs diffuse disease, RPDA 100% occlusion.       Cardiac Pacemaker- Medtronic, dual chamber- NOT dependant 11/28/2007     CKD (chronic kidney disease), stage IV (H)      Hyperlipidemia LDL goal <70      Hypertension      Stented coronary artery 10-    RCA     Stented coronary artery 2-5-2003    LCx     Stented coronary artery 4-    LAD     Stented coronary artery 11-    LAD     Stented coronary artery 12-    RCA     Transient complete heart block (H) 11/28/2007     Family History   Problem Relation Age of Onset     Cancer Sister 82        bladder cancer     Current Outpatient Medications   Medication Sig Dispense Refill     allopurinol (ZYLOPRIM) 100 MG tablet Take 1 tablet (100 mg) by mouth daily 90 tablet 3     aspirin 81 MG tablet Take 1 tablet by mouth daily.       clopidogrel (PLAVIX) 75 MG tablet Take 1 tablet (75 mg) by mouth daily 90 tablet 3     cyanocolbalamin (VITAMIN B-12) 1000 MCG tablet Take 500 mcg by mouth daily As directed       furosemide (LASIX) 40 MG tablet Take 1 tablet (40 mg) by mouth 2 times daily 60 tablet 11     isosorbide mononitrate (ISMO/MONOKET) 20 MG tablet Take 2 tablets (40 mg) by mouth 2 times daily 540 tablet 0     metoprolol tartrate (LOPRESSOR) 100 MG tablet Take 1 tablet (100 mg) by mouth 2 times daily 180 tablet 3     Multiple Vitamins-Minerals (PRESERVISION AREDS 2+MULTI VIT PO) Take by mouth 2 times daily       omeprazole (PRILOSEC) 40 MG DR capsule TAKE ONE CAPSULE BY MOUTH ONCE DAILY 90 capsule 3     potassium chloride ER (K-TAB/KLOR-CON) 10 MEQ CR tablet TAKE TWO TABLETS BY MOUTH DAILY IN THE MORNING AND ONE TABLET IN THE EVENING 270 tablet 3     pravastatin  (PRAVACHOL) 80 MG tablet Take 1 tablet (80 mg) by mouth daily Needs lab 90 tablet 0     Vitamin D3 (CHOLECALCIFEROL) 25 mcg (1000 units) tablet Take 1 tablet (25 mcg) by mouth daily 30 tablet 11     amLODIPine (NORVASC) 2.5 MG tablet Take 1 tablet (2.5 mg) by mouth daily (Patient not taking: Reported on 1/22/2020) 30 tablet 11     hydrALAZINE (APRESOLINE) 10 MG tablet Take 1 tablet (10 mg) by mouth 3 times daily (Patient not taking: Reported on 1/22/2020) 90 tablet 1     NITROSTAT 0.4 MG sublingual tablet DISSOLVE ONE TABLET UNDER THE TONGUE EVERY 5 MINUTES AS NEEDED FOR CHEST PAIN.  DO NOT EXCEED A TOTAL OF 3 DOSES IN 15 MINUTES. CALL 911. (Patient not taking: Reported on 2/25/2020) 25 tablet 3     Social History     Tobacco Use     Smoking status: Former Smoker     Packs/day: 0.30     Years: 15.00     Pack years: 4.50     Types: Cigarettes     Smokeless tobacco: Never Used     Tobacco comment: Quit 22+ years ago   Substance Use Topics     Alcohol use: Not on file       OBJECTIVE  BP (!) 154/79   Pulse 89   Temp 98.1  F (36.7  C) (Oral)   Resp 14   Wt 75.2 kg (165 lb 12.8 oz)   SpO2 98%   BMI 25.97 kg/m      Physical Exam  Vitals signs and nursing note reviewed.   Constitutional:       General: She is not in acute distress.     Appearance: Normal appearance. She is normal weight. She is not ill-appearing, toxic-appearing or diaphoretic.   HENT:      Head: Normocephalic and atraumatic.      Comments: No tenderness to sinuses.     Right Ear: Ear canal and external ear normal. There is impacted cerumen.      Left Ear: Ear canal and external ear normal. There is impacted cerumen.      Nose: Nose normal.      Mouth/Throat:      Mouth: Mucous membranes are moist.      Pharynx: Oropharynx is clear.   Eyes:      Extraocular Movements: Extraocular movements intact.      Conjunctiva/sclera: Conjunctivae normal.   Neck:      Musculoskeletal: Normal range of motion. No neck rigidity or muscular tenderness.    Cardiovascular:      Rate and Rhythm: Normal rate and regular rhythm.      Pulses: Normal pulses.      Heart sounds: Normal heart sounds.   Pulmonary:      Effort: Pulmonary effort is normal. No respiratory distress.      Breath sounds: Normal breath sounds. No stridor. No wheezing, rhonchi or rales.   Lymphadenopathy:      Cervical: No cervical adenopathy.   Skin:     General: Skin is warm and dry.      Capillary Refill: Capillary refill takes less than 2 seconds.      Findings: No rash.   Neurological:      General: No focal deficit present.      Mental Status: She is alert.   Psychiatric:         Mood and Affect: Mood normal.         Behavior: Behavior normal.         Labs:  No results found for this or any previous visit (from the past 24 hour(s)).    X-Ray was not done.    ASSESSMENT:      ICD-10-CM    1. Upper respiratory tract infection, unspecified type J06.9         Medical Decision Making:    Differential Diagnosis:  URI Adult/Peds:  Sinusitis and Viral upper respiratory illness    Serious Comorbid Conditions:  Adult:  Anticoagulation    PLAN:    URI Adult:  Tylenol, Fluids, Rest and OTC cough suppressant/expectorant    Followup:    If not improving or if condition worsens, follow up with your Primary Care Provider, If not improving or if conditions worsens over the next 12-24 hours, go to the Emergency Department    Patient Instructions       Patient Education     Viral Upper Respiratory Illness (Adult)    You have a viral upper respiratory illness (URI), which is another term for the common cold. This illness is contagious during the first few days. It is spread through the air by coughing and sneezing. It may also be spread by direct contact (touching the sick person and then touching your own eyes, nose, or mouth). Frequent handwashing will decrease risk of spread. Most viral illnesses go away within 7 to 10 days with rest and simple home remedies. Sometimes the illness may last for several weeks.  Antibiotics will not kill a virus, and they are generally not prescribed for this condition.  Home care    If symptoms are severe, rest at home for the first 2 to 3 days. When you resume activity, don't let yourself get too tired.    Don't smoke. If you need help stopping, talk with your healthcare provider.    Avoid being exposed to cigarette smoke (yours or others ).    You may use acetaminophen or ibuprofen to control pain and fever, unless another medicine was prescribed. If you have chronic liver or kidney disease, have ever had a stomach ulcer or gastrointestinal bleeding, or are taking blood-thinning medicines, talk with your healthcare provider before using these medicines. Aspirin should never be given to anyone under 18 years of age who is ill with a viral infection or fever. It may cause severe liver or brain damage.    Your appetite may be poor, so a light diet is fine. Stay well hydrated by drinking 6 to 8 glasses of fluids per day (water, soft drinks, juices, tea, or soup). Extra fluids will help loosen secretions in the nose and lungs.    Over-the-counter cold medicines will not shorten the length of time you re sick, but they may be helpful for the following symptoms: cough, sore throat, and nasal and sinus congestion. If you take prescription medicines, ask your healthcare provider or pharmacist which over-the-counter medicines are safe to use. (Note: Don't use decongestants if you have high blood pressure.)  Follow-up care  Follow up with your healthcare provider, or as advised.  When to seek medical advice  Call your healthcare provider right away if any of these occur:    Cough with lots of colored sputum (mucus)    Severe headache; face, neck, or ear pain    Difficulty swallowing due to throat pain    Fever of 100.4 F (38 C) or higher, or as directed by your healthcare provider  Call 911  Call 911 if any of these occur:    Chest pain, shortness of breath, wheezing, or difficulty  breathing    Coughing up blood    Very severe pain with swallowing, especially if it goes along with a muffled voice   Date Last Reviewed: 6/1/2018 2000-2019 [employer]. [address] [place] [address], [place] [address]. All rights reserved. This information is not intended as a substitute for professional medical care. Always follow your healthcare professional's instructions.

## 2020-02-25 NOTE — PATIENT INSTRUCTIONS

## 2020-03-02 ENCOUNTER — CARE COORDINATION (OUTPATIENT)
Dept: NEPHROLOGY | Facility: CLINIC | Age: 85
End: 2020-03-02

## 2020-03-02 NOTE — PROGRESS NOTES
Nephrology Note: Nursing Outreach Encounter    REASON FOR CALL:                                                      REASON FOR CALL: Blood Pressure Follow Up                                          SITUATION/BACKROUND:                                                    Patient is being treated for HTN.      ASSESSMENT:                                                      Spoke with Tessa. From a nephrology standpoint, she's doing well. Reports no change with BP (systolic steady in the 130-140's), and she's staying well hydrated. She has had a cold for 3 weeks, which isn't going away. Reports congestion, productive cough, and sore throat. She went to Urgent Care when she noticed bloody mucus, but was told her symptoms are likely viral and the blood is due to medications / dry air. She noticed green mucus two days ago, but it has since returned to clear. No color to expectorated phlegm. Advised her to follow up with PCP if symptoms do not improve (or if mucus changes color again, indicating possible bacterial infection). She agreed to plan. Denied any questions for our team today.    Uremic Symptoms: No       PLAN:                                                      Follow Up:   Follow up call in 3-4 weeks     Patient verbalized understanding and will contact the clinic with any further questions or concerns.     Christy Roblero RN

## 2020-03-09 ENCOUNTER — TELEPHONE (OUTPATIENT)
Dept: FAMILY MEDICINE | Facility: CLINIC | Age: 85
End: 2020-03-09

## 2020-03-09 DIAGNOSIS — J32.9 SINUS INFECTION: Primary | ICD-10-CM

## 2020-03-09 NOTE — TELEPHONE ENCOUNTER
[facility] Call Center    Phone Message    May a detailed message be left on voicemail: yes     Reason for Call: Symptoms or Concerns     If patient has red-flag symptoms, warm transfer to triage line    Current symptom or concern:     Symptoms have been present for:  3 week(s)    Has patient previously been seen for this? Yes    By : [facility]    Date: 02/25/2020    Are there any new or worsening symptoms? Yes: Pt is going on her 3rd week of having a cold. Pt was blowing up blood from her a nose and was seen at [facility]. Pt said she spoke to Dr. Mann and was told she's fine and that she's blowing blood from her nose because she's taking blood thinner. Now pt is coughing up blood. Pt wants to know if she can get some medication to help with this so she doesn't have to come in for an appointment. Please call pt and let her know. Thank you.       Action Taken: Message routed to:  [facility]    Travel Screening: Not Applicable

## 2020-03-10 RX ORDER — DOXYCYCLINE HYCLATE 100 MG
100 TABLET ORAL 2 TIMES DAILY
Qty: 20 TABLET | Refills: 0 | Status: SHIPPED | OUTPATIENT
Start: 2020-03-10 | End: 2020-08-10

## 2020-03-10 NOTE — TELEPHONE ENCOUNTER
Spoke with pt, states that she has been having nasal congestion and non productive cough for almost 3 weeks. She had nasal bleeding last week and she went to urgent care on 2/25, was advised to have home remedies for viral infection. Now her nose bleeding stopped and she is having dark red spots of blood in her phlegm for 2 days. Then she did not notice any more blood in her phlegm this morning. She is currently taking Plavix 75 mg and asa 81 mg daily.  Denied fever, sore throat, chest hurting, but she is having lots of nasal congestion with greenish drainage and lost of cough with phlegm. Her breathing is bothered during the sleep due to nasal congestion. She has been taking OTC meds, but not helping.    Soon-Mi

## 2020-03-10 NOTE — TELEPHONE ENCOUNTER
Nicola glover sinus or bronchitis infection    Ok doxycycline 100 mg one po bid for ten days    If still not better probably should be seen

## 2020-03-30 ENCOUNTER — CARE COORDINATION (OUTPATIENT)
Dept: NEPHROLOGY | Facility: CLINIC | Age: 85
End: 2020-03-30

## 2020-03-30 NOTE — PROGRESS NOTES
Nephrology Note: Nursing Outreach Encounter    REASON FOR CALL:                                                      REASON FOR CALL: Blood Pressure Follow Up                                          SITUATION/BACKROUND:                                                    Patient is being treated for HTN.       ASSESSMENT:                                                      Checked in with Tessa. She's doing well, staying occupied at home. Her BP is typically in the 130/70's, but she's had occasional readings in the 140/80 range. Denied any symptoms, will continue to monitor BP and call clinic with any changes.    Currently taking lasix 40mg BID, imdur 40mg BID, metoprolol 100mg BID, and amlodipine 2.5mg daily. She is no longer on hydralazine (10mg TID).    Uremic Symptoms: No       PLAN:                                                      Follow Up:   Patient to call/MyChart message with updates     Patient verbalized understanding and will contact the clinic with any further questions or concerns.     Christy Roblero RN

## 2020-04-16 DIAGNOSIS — E78.5 HYPERLIPIDEMIA LDL GOAL <130: ICD-10-CM

## 2020-04-17 RX ORDER — PRAVASTATIN SODIUM 80 MG/1
80 TABLET ORAL DAILY
Qty: 90 TABLET | Refills: 0 | Status: SHIPPED | OUTPATIENT
Start: 2020-04-17 | End: 2020-07-27

## 2020-04-17 NOTE — TELEPHONE ENCOUNTER
Pravastatin Sodium 80 MG Oral Tablet   Last Written Prescription Date:  1/10/2020  Last Fill Quantity: 90,   # refills: 0  Last Office Visit : 12/17/2019  Future Office visit:  6/17/2020    Routing refill request to provider for review/approval because:  Labs Due:   LDL       30 day pended and referred to clinic  Nurse - this is a second refill request for medication with out or overdue labs. With last request pt was given 90 day nilay and Elaine notified.      Recent Labs   Lab Test 06/25/18  0942   LDL 70     Uma Pool RN  Central Triage Red Flags/Med Refills

## 2020-04-22 ENCOUNTER — ANCILLARY PROCEDURE (OUTPATIENT)
Dept: CARDIOLOGY | Facility: CLINIC | Age: 85
End: 2020-04-22
Attending: NURSE PRACTITIONER
Payer: COMMERCIAL

## 2020-04-22 DIAGNOSIS — I49.5 SICK SINUS SYNDROME (H): ICD-10-CM

## 2020-04-22 DIAGNOSIS — Z95.0 CARDIAC PACEMAKER IN SITU: Primary | ICD-10-CM

## 2020-04-22 PROCEDURE — 93296 REM INTERROG EVL PM/IDS: CPT | Mod: ZF

## 2020-04-22 PROCEDURE — 93294 REM INTERROG EVL PM/LDLS PM: CPT | Mod: ZP | Performed by: INTERNAL MEDICINE

## 2020-04-23 LAB
MDC_IDC_LEAD_IMPLANT_DT: NORMAL
MDC_IDC_LEAD_IMPLANT_DT: NORMAL
MDC_IDC_LEAD_LOCATION: NORMAL
MDC_IDC_LEAD_LOCATION: NORMAL
MDC_IDC_LEAD_LOCATION_DETAIL_1: NORMAL
MDC_IDC_LEAD_LOCATION_DETAIL_1: NORMAL
MDC_IDC_LEAD_MFG: NORMAL
MDC_IDC_LEAD_MFG: NORMAL
MDC_IDC_LEAD_MODEL: NORMAL
MDC_IDC_LEAD_MODEL: NORMAL
MDC_IDC_LEAD_POLARITY_TYPE: NORMAL
MDC_IDC_LEAD_POLARITY_TYPE: NORMAL
MDC_IDC_LEAD_SERIAL: NORMAL
MDC_IDC_LEAD_SERIAL: NORMAL
MDC_IDC_LEAD_SPECIAL_FUNCTION: NORMAL
MDC_IDC_LEAD_SPECIAL_FUNCTION: NORMAL
MDC_IDC_MSMT_BATTERY_DTM: NORMAL
MDC_IDC_MSMT_BATTERY_REMAINING_LONGEVITY: 158 MO
MDC_IDC_MSMT_BATTERY_RRT_TRIGGER: 2.62
MDC_IDC_MSMT_BATTERY_STATUS: NORMAL
MDC_IDC_MSMT_BATTERY_VOLTAGE: 3.16 V
MDC_IDC_MSMT_LEADCHNL_RA_IMPEDANCE_VALUE: 342 OHM
MDC_IDC_MSMT_LEADCHNL_RA_IMPEDANCE_VALUE: 380 OHM
MDC_IDC_MSMT_LEADCHNL_RA_PACING_THRESHOLD_AMPLITUDE: 0.5 V
MDC_IDC_MSMT_LEADCHNL_RA_PACING_THRESHOLD_PULSEWIDTH: 0.4 MS
MDC_IDC_MSMT_LEADCHNL_RA_SENSING_INTR_AMPL: 2.38 MV
MDC_IDC_MSMT_LEADCHNL_RA_SENSING_INTR_AMPL: 2.38 MV
MDC_IDC_MSMT_LEADCHNL_RV_IMPEDANCE_VALUE: 399 OHM
MDC_IDC_MSMT_LEADCHNL_RV_IMPEDANCE_VALUE: 456 OHM
MDC_IDC_MSMT_LEADCHNL_RV_PACING_THRESHOLD_AMPLITUDE: 0.88 V
MDC_IDC_MSMT_LEADCHNL_RV_PACING_THRESHOLD_PULSEWIDTH: 0.4 MS
MDC_IDC_MSMT_LEADCHNL_RV_SENSING_INTR_AMPL: 10.62 MV
MDC_IDC_MSMT_LEADCHNL_RV_SENSING_INTR_AMPL: 10.62 MV
MDC_IDC_PG_IMPLANT_DTM: NORMAL
MDC_IDC_PG_MFG: NORMAL
MDC_IDC_PG_MODEL: NORMAL
MDC_IDC_PG_SERIAL: NORMAL
MDC_IDC_PG_TYPE: NORMAL
MDC_IDC_SESS_CLINIC_NAME: NORMAL
MDC_IDC_SESS_DTM: NORMAL
MDC_IDC_SESS_TYPE: NORMAL
MDC_IDC_SET_BRADY_AT_MODE_SWITCH_RATE: 171 {BEATS}/MIN
MDC_IDC_SET_BRADY_HYSTRATE: NORMAL
MDC_IDC_SET_BRADY_LOWRATE: 60 {BEATS}/MIN
MDC_IDC_SET_BRADY_MAX_SENSOR_RATE: 130 {BEATS}/MIN
MDC_IDC_SET_BRADY_MAX_TRACKING_RATE: 130 {BEATS}/MIN
MDC_IDC_SET_BRADY_MODE: NORMAL
MDC_IDC_SET_BRADY_PAV_DELAY_LOW: 180 MS
MDC_IDC_SET_BRADY_SAV_DELAY_LOW: 150 MS
MDC_IDC_SET_LEADCHNL_RA_PACING_AMPLITUDE: 1.5 V
MDC_IDC_SET_LEADCHNL_RA_PACING_ANODE_ELECTRODE_1: NORMAL
MDC_IDC_SET_LEADCHNL_RA_PACING_ANODE_LOCATION_1: NORMAL
MDC_IDC_SET_LEADCHNL_RA_PACING_CAPTURE_MODE: NORMAL
MDC_IDC_SET_LEADCHNL_RA_PACING_CATHODE_ELECTRODE_1: NORMAL
MDC_IDC_SET_LEADCHNL_RA_PACING_CATHODE_LOCATION_1: NORMAL
MDC_IDC_SET_LEADCHNL_RA_PACING_POLARITY: NORMAL
MDC_IDC_SET_LEADCHNL_RA_PACING_PULSEWIDTH: 0.4 MS
MDC_IDC_SET_LEADCHNL_RA_SENSING_ANODE_ELECTRODE_1: NORMAL
MDC_IDC_SET_LEADCHNL_RA_SENSING_ANODE_LOCATION_1: NORMAL
MDC_IDC_SET_LEADCHNL_RA_SENSING_CATHODE_ELECTRODE_1: NORMAL
MDC_IDC_SET_LEADCHNL_RA_SENSING_CATHODE_LOCATION_1: NORMAL
MDC_IDC_SET_LEADCHNL_RA_SENSING_POLARITY: NORMAL
MDC_IDC_SET_LEADCHNL_RA_SENSING_SENSITIVITY: 0.3 MV
MDC_IDC_SET_LEADCHNL_RV_PACING_AMPLITUDE: 2 V
MDC_IDC_SET_LEADCHNL_RV_PACING_ANODE_ELECTRODE_1: NORMAL
MDC_IDC_SET_LEADCHNL_RV_PACING_ANODE_LOCATION_1: NORMAL
MDC_IDC_SET_LEADCHNL_RV_PACING_CAPTURE_MODE: NORMAL
MDC_IDC_SET_LEADCHNL_RV_PACING_CATHODE_ELECTRODE_1: NORMAL
MDC_IDC_SET_LEADCHNL_RV_PACING_CATHODE_LOCATION_1: NORMAL
MDC_IDC_SET_LEADCHNL_RV_PACING_POLARITY: NORMAL
MDC_IDC_SET_LEADCHNL_RV_PACING_PULSEWIDTH: 0.4 MS
MDC_IDC_SET_LEADCHNL_RV_SENSING_ANODE_ELECTRODE_1: NORMAL
MDC_IDC_SET_LEADCHNL_RV_SENSING_ANODE_LOCATION_1: NORMAL
MDC_IDC_SET_LEADCHNL_RV_SENSING_CATHODE_ELECTRODE_1: NORMAL
MDC_IDC_SET_LEADCHNL_RV_SENSING_CATHODE_LOCATION_1: NORMAL
MDC_IDC_SET_LEADCHNL_RV_SENSING_POLARITY: NORMAL
MDC_IDC_SET_LEADCHNL_RV_SENSING_SENSITIVITY: 0.9 MV
MDC_IDC_SET_ZONE_DETECTION_INTERVAL: 350 MS
MDC_IDC_SET_ZONE_DETECTION_INTERVAL: 400 MS
MDC_IDC_SET_ZONE_TYPE: NORMAL
MDC_IDC_STAT_BRADY_AP_VP_PERCENT: 0.03 %
MDC_IDC_STAT_BRADY_AP_VS_PERCENT: 82.85 %
MDC_IDC_STAT_BRADY_AS_VP_PERCENT: 0.02 %
MDC_IDC_STAT_BRADY_AS_VS_PERCENT: 17.1 %
MDC_IDC_STAT_BRADY_DTM_END: NORMAL
MDC_IDC_STAT_BRADY_DTM_START: NORMAL
MDC_IDC_STAT_BRADY_RA_PERCENT_PACED: 82.95 %
MDC_IDC_STAT_BRADY_RV_PERCENT_PACED: 0.05 %
MDC_IDC_STAT_EPISODE_RECENT_COUNT: 0
MDC_IDC_STAT_EPISODE_RECENT_COUNT_DTM_END: NORMAL
MDC_IDC_STAT_EPISODE_RECENT_COUNT_DTM_START: NORMAL
MDC_IDC_STAT_EPISODE_TOTAL_COUNT: 0
MDC_IDC_STAT_EPISODE_TOTAL_COUNT_DTM_END: NORMAL
MDC_IDC_STAT_EPISODE_TOTAL_COUNT_DTM_START: NORMAL
MDC_IDC_STAT_EPISODE_TYPE: NORMAL

## 2020-06-17 ENCOUNTER — VIRTUAL VISIT (OUTPATIENT)
Dept: FAMILY MEDICINE | Facility: CLINIC | Age: 85
End: 2020-06-17
Payer: COMMERCIAL

## 2020-06-17 DIAGNOSIS — R05.9 COUGH: Primary | ICD-10-CM

## 2020-06-17 RX ORDER — METHYLPREDNISOLONE 4 MG
TABLET, DOSE PACK ORAL
Qty: 21 TABLET | Refills: 0 | Status: SHIPPED | OUTPATIENT
Start: 2020-06-17 | End: 2020-08-10

## 2020-06-17 ASSESSMENT — PAIN SCALES - GENERAL: PAINLEVEL: SEVERE PAIN (6)

## 2020-06-17 NOTE — NURSING NOTE
Chief Complaint   Patient presents with     Recheck Medication     6 month follow up.     Cough     Discuss ongoing cough and phlegm.      CHIQUIS Nesbitt 10:26 AM  6/17/2020

## 2020-06-17 NOTE — PROGRESS NOTES
"Tessa Mansfield is a 85 year old female who is being evaluated via a billable telephone visit.      The patient has been notified of following:     \"This telephone visit will be conducted via a call between you and your physician/provider. We have found that certain health care needs can be provided without the need for a physical exam.  This service lets us provide the care you need with a short phone conversation.  If a prescription is necessary we can send it directly to your pharmacy.  If lab work is needed we can place an order for that and you can then stop by our lab to have the test done at a later time.    Telephone visits are billed at different rates depending on your insurance coverage. During this emergency period, for some insurers they may be billed the same as an in-person visit.  Please reach out to your insurance provider with any questions.    If during the course of the call the physician/provider feels a telephone visit is not appropriate, you will not be charged for this service.\"    Patient has given verbal consent for Telephone visit?  Yes    What phone number would you like to be contacted at? In [facility]      How would you like to obtain your AVS? Rellhart    Subjective     Tessa Mansfield is a 85 year old female who presents via phone visit today for the following health issues:    HPI   1-some evenings, not all, skin around ankles is red and purple, not obvioulsy swollen (does get some leg edema at times but no more than baselie). Not red, tender, goes away overnight  2-bad cold mid winter. Went away except all spring still occasional cough w/ clear phlegm, and some clear sinus drg. No fever, pain, dyspnea.    Otherwise no significant interval history. In law  of covid, might move soon to [place] level [facility]    Past Medical History:   Diagnosis Date     CAD (coronary artery disease)      CAD s/p PCI+BMS to MRCA 10/2002, PCI to mLCX 2003, PCI+DESx2 to pLAD 2007, PCI+DESx1 to dRCA " 12/2007, PCI+ALVAREZ to RPDA 7/2013; on indefinite DAPT  10/27/2002    10/27/2002: AMI s/p PCI+BMS (3.5x18mm Bx velocity) to mRCA 2/5/2003: Back pain; PCI+stent to mLCX c/b distal embolization/slow flow 4/11/2003: Unstable angina; PCI+stent (Hepacoat velocity) to mLAD 11/27/2007: PCI+DESx2 to pLAD (IVUS w/ calcification of LMCA and ulcerated plaque pLAD, 80% dRCA; also had 80% dLCX, too small to stent) 12/11/2007: Staged PCI. PCI+DESx1 (3.5x13mm Cypher) to dRCA (indefinite DAPT recommended at this time) 6/27/2008: Angina. mLCX stent 70% ISR. LAD and RCA stents patent. Myocardium at risk from LCX felt to be small, medical management preferred to PCI. 11/10/2009: Angina. Findings unchanged from 6/27/2008, FFR LAD 0.90. Medical management recommended. 7/23/2013: Unstable angina; PCI+ALVAREZ to mPDA. Diagnostic findings: LMCA 40% distal. LAD: pLAD stents patent mLAD 30% ISR dLAD 50% diffuse, D2 diffuse disease. LCX 80% mid ISR. RCA diffuse <30% mid, RPLAs diffuse disease, RPDA 100% occlusion.       Cardiac Pacemaker- Medtronic, dual chamber- NOT dependant 11/28/2007     CKD (chronic kidney disease), stage IV (H)      Hyperlipidemia LDL goal <70      Hypertension      Stented coronary artery 10-    RCA     Stented coronary artery 2-5-2003    LCx     Stented coronary artery 4-    LAD     Stented coronary artery 11-    LAD     Stented coronary artery 12-    RCA     Transient complete heart block (H) 11/28/2007     Past Surgical History:   Procedure Laterality Date     CHOLECYSTECTOMY       EP PACEMAKER N/A 10/15/2019    Procedure: EP PACEMAKER;  Surgeon: Anthony Zhu MD;  Location:  [facility]ATH LAB     HC PPM INSERTION NEW/REPLACEMENT W/ ATRIAL&VENTRICULAR LEAD  11-     HYSTERECTOMY       Current Outpatient Medications   Medication     allopurinol (ZYLOPRIM) 100 MG tablet     aspirin 81 MG tablet     clopidogrel (PLAVIX) 75 MG tablet     cyanocolbalamin (VITAMIN B-12) 1000 MCG  "tablet     furosemide (LASIX) 40 MG tablet     isosorbide mononitrate (ISMO/MONOKET) 20 MG tablet     metoprolol tartrate (LOPRESSOR) 100 MG tablet     Multiple Vitamins-Minerals (PRESERVISION AREDS 2+MULTI VIT PO)     omeprazole (PRILOSEC) 40 MG DR capsule     potassium chloride ER (K-TAB/KLOR-CON) 10 MEQ CR tablet     pravastatin (PRAVACHOL) 80 MG tablet     Vitamin D3 (CHOLECALCIFEROL) 25 mcg (1000 units) tablet     amLODIPine (NORVASC) 2.5 MG tablet     hydrALAZINE (APRESOLINE) 10 MG tablet     NITROSTAT 0.4 MG sublingual tablet     No current facility-administered medications for this visit.      Allergies   Allergen Reactions     Bactrim [Sulfamethoxazole W/Trimethoprim] Other (See Comments)     Patient unable to recall     Biaxin [Clarithromycin]      Chlorthalidone Nausea and Vomiting     Clonidine      Codeine Sulfate Itching     Darvon [Propoxyphene Hcl]      Dilaudid [Hydromorphone] Visual Disturbance     Hallucinations       Gabapentin Other (See Comments) and Fatigue     \"felt drunk\"     Indocin      Levaquin [Levofloxacin Hemihydrate]      Lyrica Fatigue     Morphine Sulfate      Percocet [Oxycodone-Acetaminophen] Other (See Comments)     hallucinations     Pregabalin Itching     Other reaction(s): Fatigue     Shrimp Swelling     Spironolactone Other (See Comments)     Dehydrated       Terazosin      Ciprofloxacin Rash     Simvastatin Palpitations     Muscle weakness, leg cramping                  Review of Systems          Objective   Reported vitals:  There were no vitals taken for this visit.   healthy, alert and no distress  PSYCH: Alert and oriented times 3; coherent speech, normal   rate and volume, able to articulate logical thoughts, able   to abstract reason, no tangential thoughts, no hallucinations   or delusions  Her affect is normal  RESP: No cough, no audible wheezing, able to talk in full sentences  Remainder of exam unable to be completed due to telephone visits    Diagnostic Test " Results:  Labs reviewed in Epic        Assessment/Plan:  1. Cough  Call if not better with this, likely post viral  - methylPREDNISolone (MEDROL DOSEPAK) 4 MG tablet therapy pack; Follow Package Directions  Dispense: 21 tablet; Refill: 0    Try to see me in person this autumn    Ankle sx likely stasis, monitor    No follow-ups on file.      Phone call duration:  18 minutes    Pedro Gomez MD

## 2020-06-18 ENCOUNTER — TELEPHONE (OUTPATIENT)
Dept: FAMILY MEDICINE | Facility: CLINIC | Age: 85
End: 2020-06-18

## 2020-07-22 ENCOUNTER — DOCUMENTATION ONLY (OUTPATIENT)
Dept: CARE COORDINATION | Facility: CLINIC | Age: 85
End: 2020-07-22

## 2020-07-23 DIAGNOSIS — E78.5 HYPERLIPIDEMIA LDL GOAL <130: ICD-10-CM

## 2020-07-24 ENCOUNTER — ANCILLARY PROCEDURE (OUTPATIENT)
Dept: CARDIOLOGY | Facility: CLINIC | Age: 85
End: 2020-07-24
Attending: INTERNAL MEDICINE
Payer: COMMERCIAL

## 2020-07-24 DIAGNOSIS — I49.5 SICK SINUS SYNDROME (H): ICD-10-CM

## 2020-07-24 DIAGNOSIS — E78.5 HYPERLIPIDEMIA LDL GOAL <130: ICD-10-CM

## 2020-07-24 PROCEDURE — 93296 REM INTERROG EVL PM/IDS: CPT | Mod: ZF

## 2020-07-24 PROCEDURE — 93294 REM INTERROG EVL PM/LDLS PM: CPT | Mod: ZP | Performed by: INTERNAL MEDICINE

## 2020-07-25 LAB
MDC_IDC_EPISODE_DTM: NORMAL
MDC_IDC_EPISODE_DURATION: 98 S
MDC_IDC_EPISODE_ID: 1
MDC_IDC_EPISODE_TYPE: NORMAL
MDC_IDC_LEAD_IMPLANT_DT: NORMAL
MDC_IDC_LEAD_IMPLANT_DT: NORMAL
MDC_IDC_LEAD_LOCATION: NORMAL
MDC_IDC_LEAD_LOCATION: NORMAL
MDC_IDC_LEAD_LOCATION_DETAIL_1: NORMAL
MDC_IDC_LEAD_LOCATION_DETAIL_1: NORMAL
MDC_IDC_LEAD_MFG: NORMAL
MDC_IDC_LEAD_MFG: NORMAL
MDC_IDC_LEAD_MODEL: NORMAL
MDC_IDC_LEAD_MODEL: NORMAL
MDC_IDC_LEAD_POLARITY_TYPE: NORMAL
MDC_IDC_LEAD_POLARITY_TYPE: NORMAL
MDC_IDC_LEAD_SERIAL: NORMAL
MDC_IDC_LEAD_SERIAL: NORMAL
MDC_IDC_LEAD_SPECIAL_FUNCTION: NORMAL
MDC_IDC_LEAD_SPECIAL_FUNCTION: NORMAL
MDC_IDC_MSMT_BATTERY_DTM: NORMAL
MDC_IDC_MSMT_BATTERY_REMAINING_LONGEVITY: 153 MO
MDC_IDC_MSMT_BATTERY_RRT_TRIGGER: 2.62
MDC_IDC_MSMT_BATTERY_STATUS: NORMAL
MDC_IDC_MSMT_BATTERY_VOLTAGE: 3.11 V
MDC_IDC_MSMT_LEADCHNL_RA_IMPEDANCE_VALUE: 323 OHM
MDC_IDC_MSMT_LEADCHNL_RA_IMPEDANCE_VALUE: 361 OHM
MDC_IDC_MSMT_LEADCHNL_RA_PACING_THRESHOLD_AMPLITUDE: 0.5 V
MDC_IDC_MSMT_LEADCHNL_RA_PACING_THRESHOLD_PULSEWIDTH: 0.4 MS
MDC_IDC_MSMT_LEADCHNL_RA_SENSING_INTR_AMPL: 2.12 MV
MDC_IDC_MSMT_LEADCHNL_RA_SENSING_INTR_AMPL: 2.12 MV
MDC_IDC_MSMT_LEADCHNL_RV_IMPEDANCE_VALUE: 399 OHM
MDC_IDC_MSMT_LEADCHNL_RV_IMPEDANCE_VALUE: 437 OHM
MDC_IDC_MSMT_LEADCHNL_RV_PACING_THRESHOLD_AMPLITUDE: 0.88 V
MDC_IDC_MSMT_LEADCHNL_RV_PACING_THRESHOLD_PULSEWIDTH: 0.4 MS
MDC_IDC_MSMT_LEADCHNL_RV_SENSING_INTR_AMPL: 10.62 MV
MDC_IDC_MSMT_LEADCHNL_RV_SENSING_INTR_AMPL: 10.62 MV
MDC_IDC_PG_IMPLANT_DTM: NORMAL
MDC_IDC_PG_MFG: NORMAL
MDC_IDC_PG_MODEL: NORMAL
MDC_IDC_PG_SERIAL: NORMAL
MDC_IDC_PG_TYPE: NORMAL
MDC_IDC_SESS_CLINIC_NAME: NORMAL
MDC_IDC_SESS_DTM: NORMAL
MDC_IDC_SESS_TYPE: NORMAL
MDC_IDC_SET_BRADY_AT_MODE_SWITCH_RATE: 171 {BEATS}/MIN
MDC_IDC_SET_BRADY_HYSTRATE: NORMAL
MDC_IDC_SET_BRADY_LOWRATE: 60 {BEATS}/MIN
MDC_IDC_SET_BRADY_MAX_SENSOR_RATE: 130 {BEATS}/MIN
MDC_IDC_SET_BRADY_MAX_TRACKING_RATE: 130 {BEATS}/MIN
MDC_IDC_SET_BRADY_MODE: NORMAL
MDC_IDC_SET_BRADY_PAV_DELAY_LOW: 180 MS
MDC_IDC_SET_BRADY_SAV_DELAY_LOW: 150 MS
MDC_IDC_SET_LEADCHNL_RA_PACING_AMPLITUDE: 1.5 V
MDC_IDC_SET_LEADCHNL_RA_PACING_ANODE_ELECTRODE_1: NORMAL
MDC_IDC_SET_LEADCHNL_RA_PACING_ANODE_LOCATION_1: NORMAL
MDC_IDC_SET_LEADCHNL_RA_PACING_CAPTURE_MODE: NORMAL
MDC_IDC_SET_LEADCHNL_RA_PACING_CATHODE_ELECTRODE_1: NORMAL
MDC_IDC_SET_LEADCHNL_RA_PACING_CATHODE_LOCATION_1: NORMAL
MDC_IDC_SET_LEADCHNL_RA_PACING_POLARITY: NORMAL
MDC_IDC_SET_LEADCHNL_RA_PACING_PULSEWIDTH: 0.4 MS
MDC_IDC_SET_LEADCHNL_RA_SENSING_ANODE_ELECTRODE_1: NORMAL
MDC_IDC_SET_LEADCHNL_RA_SENSING_ANODE_LOCATION_1: NORMAL
MDC_IDC_SET_LEADCHNL_RA_SENSING_CATHODE_ELECTRODE_1: NORMAL
MDC_IDC_SET_LEADCHNL_RA_SENSING_CATHODE_LOCATION_1: NORMAL
MDC_IDC_SET_LEADCHNL_RA_SENSING_POLARITY: NORMAL
MDC_IDC_SET_LEADCHNL_RA_SENSING_SENSITIVITY: 0.3 MV
MDC_IDC_SET_LEADCHNL_RV_PACING_AMPLITUDE: 2 V
MDC_IDC_SET_LEADCHNL_RV_PACING_ANODE_ELECTRODE_1: NORMAL
MDC_IDC_SET_LEADCHNL_RV_PACING_ANODE_LOCATION_1: NORMAL
MDC_IDC_SET_LEADCHNL_RV_PACING_CAPTURE_MODE: NORMAL
MDC_IDC_SET_LEADCHNL_RV_PACING_CATHODE_ELECTRODE_1: NORMAL
MDC_IDC_SET_LEADCHNL_RV_PACING_CATHODE_LOCATION_1: NORMAL
MDC_IDC_SET_LEADCHNL_RV_PACING_POLARITY: NORMAL
MDC_IDC_SET_LEADCHNL_RV_PACING_PULSEWIDTH: 0.4 MS
MDC_IDC_SET_LEADCHNL_RV_SENSING_ANODE_ELECTRODE_1: NORMAL
MDC_IDC_SET_LEADCHNL_RV_SENSING_ANODE_LOCATION_1: NORMAL
MDC_IDC_SET_LEADCHNL_RV_SENSING_CATHODE_ELECTRODE_1: NORMAL
MDC_IDC_SET_LEADCHNL_RV_SENSING_CATHODE_LOCATION_1: NORMAL
MDC_IDC_SET_LEADCHNL_RV_SENSING_POLARITY: NORMAL
MDC_IDC_SET_LEADCHNL_RV_SENSING_SENSITIVITY: 0.9 MV
MDC_IDC_SET_ZONE_DETECTION_INTERVAL: 350 MS
MDC_IDC_SET_ZONE_DETECTION_INTERVAL: 400 MS
MDC_IDC_SET_ZONE_TYPE: NORMAL
MDC_IDC_STAT_BRADY_AP_VP_PERCENT: 0.03 %
MDC_IDC_STAT_BRADY_AP_VS_PERCENT: 88.63 %
MDC_IDC_STAT_BRADY_AS_VP_PERCENT: 0.02 %
MDC_IDC_STAT_BRADY_AS_VS_PERCENT: 11.32 %
MDC_IDC_STAT_BRADY_DTM_END: NORMAL
MDC_IDC_STAT_BRADY_DTM_START: NORMAL
MDC_IDC_STAT_BRADY_RA_PERCENT_PACED: 88.7 %
MDC_IDC_STAT_BRADY_RV_PERCENT_PACED: 0.05 %
MDC_IDC_STAT_EPISODE_RECENT_COUNT: 0
MDC_IDC_STAT_EPISODE_RECENT_COUNT: 0
MDC_IDC_STAT_EPISODE_RECENT_COUNT: 1
MDC_IDC_STAT_EPISODE_RECENT_COUNT_DTM_END: NORMAL
MDC_IDC_STAT_EPISODE_RECENT_COUNT_DTM_START: NORMAL
MDC_IDC_STAT_EPISODE_TOTAL_COUNT: 0
MDC_IDC_STAT_EPISODE_TOTAL_COUNT: 0
MDC_IDC_STAT_EPISODE_TOTAL_COUNT: 1
MDC_IDC_STAT_EPISODE_TOTAL_COUNT_DTM_END: NORMAL
MDC_IDC_STAT_EPISODE_TOTAL_COUNT_DTM_START: NORMAL
MDC_IDC_STAT_EPISODE_TYPE: NORMAL

## 2020-07-27 RX ORDER — PRAVASTATIN SODIUM 80 MG/1
80 TABLET ORAL DAILY
Qty: 90 TABLET | Refills: 0 | Status: SHIPPED | OUTPATIENT
Start: 2020-07-27 | End: 2020-10-27

## 2020-07-27 RX ORDER — PRAVASTATIN SODIUM 80 MG/1
TABLET ORAL
Qty: 90 TABLET | Refills: 0 | OUTPATIENT
Start: 2020-07-27

## 2020-07-27 NOTE — TELEPHONE ENCOUNTER
Last Virtual Visit: 6/17/20, [place] 9/10/20.  Overdue for LDL, 90 day refill provided per protocol, routed to clinic for follow up

## 2020-08-10 ENCOUNTER — VIRTUAL VISIT (OUTPATIENT)
Dept: NEPHROLOGY | Facility: CLINIC | Age: 85
End: 2020-08-10
Payer: COMMERCIAL

## 2020-08-10 DIAGNOSIS — I10 ESSENTIAL HYPERTENSION: ICD-10-CM

## 2020-08-10 DIAGNOSIS — N25.81 SECONDARY RENAL HYPERPARATHYROIDISM (H): ICD-10-CM

## 2020-08-10 DIAGNOSIS — N18.4 CKD (CHRONIC KIDNEY DISEASE) STAGE 4, GFR 15-29 ML/MIN (H): Primary | ICD-10-CM

## 2020-08-10 DIAGNOSIS — N18.4 STAGE 4 CHRONIC KIDNEY DISEASE (H): ICD-10-CM

## 2020-08-10 DIAGNOSIS — E87.6 HYPOKALEMIA: ICD-10-CM

## 2020-08-10 RX ORDER — TIMOLOL MALEATE 6.8 MG/ML
SOLUTION/ DROPS OPHTHALMIC
COMMUNITY
Start: 2020-05-05 | End: 2022-11-21

## 2020-08-10 NOTE — PROGRESS NOTES
"Tessa Mansfield is a 85 year old female who is being evaluated via a billable telephone visit.      The patient has been notified of following:     \"This telephone visit will be conducted via a call between you and your physician/provider. We have found that certain health care needs can be provided without the need for a physical exam.  This service lets us provide the care you need with a short phone conversation.  If a prescription is necessary we can send it directly to your pharmacy.  If lab work is needed we can place an order for that and you can then stop by our lab to have the test done at a later time.    Telephone visits are billed at different rates depending on your insurance coverage. During this emergency period, for some insurers they may be billed the same as an in-person visit.  Please reach out to your insurance provider with any questions.    If during the course of the call the physician/provider feels a telephone visit is not appropriate, you will not be charged for this service.\"    Patient has given verbal consent for Telephone visit?  Yes    What phone number would you like to be contacted at? [phone]      How would you like to obtain your AVS? Mail a copy    Phone call duration: 13 minutes    Marzena Aftab Martin MD      Assessment and Plan:   85 year old female with history of long standing HTN, CAD s/p multiple stents, who presents for followup of CKD stage 3, baseline SCr 1.8-2.2 mg/dL without proteinuria.  1. CKD stage 3- baseline SCr 1.8-2.2mg/ dL. Mild/ minimal proteinuria     - she had gained weight but now maintaing 160 lbs, stable  Her swelling is managed, she has some occasionally and takes an extra furosemide  - labs needs to be checked, will follow, has been stable, last creatinine 1.9 six months ago     2. Electrolytes/Acid Base status- normal.    Hypokalemia- takes 3 potassium pills daily   - if she has more cramps, should let us know and eat high K diet.  3. " Hypertension/Volume status-  She is up a little and swelling is also worse. She is on metoprolol 100 mg BID, furosemide 40 mg twice a day with extra pill prn  Last 2D ECHO in 07/2013 showed normal LVF 65 %, no significant valvular abnormalities, normal RV function.  -patient reports history of reaction/iontolerance to Spironolactone, Chlorthalidone, Clonidine and Terazosin in the past   -patient was advised to monitor her BP daily and call nephrology clinic if her BP is persistently above 140/90 mmHg    4. Anemia- Improved. Hgb is in normal range. Iron studies were not recently checked    5. BMD  -normal serum calcium and phosphorus  -secondary hyperparathyroidism-PTH is 184. Patient had PTH of 144 in 11/17. We will recheck      Assessment and plan was discussed with patient and she voiced her understanding and agreement.        Reason for Visit:  Tessa Mansfield is a 85 year old female with HTN, who presents for CKD management.     HPI:  She is a pleasant female with PMMHx significant for stage III CKD presumed secondary to hypertensive nephrosclerosis.Her renal function has been  relatively stable, ranging from 1.8-2.2 mg/dL over the years. She has history of CAD s/p multiple stents by Dr Mir (7 stents in all) since 2004 and a pacemaker in 2007. She follows with cardiologist Dr Santoyo and Dr Zhu.  Since her last visit she has been well , without major illness, no hospitalizations . Patient states that she has not been checking her blood pressure at home.  She denies dyspnea, can be fairly active though admits to being lazy. She denies GI or  issues. Her appetite is good and her  'takes care of her'- he does most of the cooking. Patient states that she follows low sodium diet.  She had joined weight watchers and has lost a significant amount of weight and we held her amlodipine given lower BP  She now gained back some, and now stable around 160lbs. She has been baking a lot with covid  She has had a  cough with some clear sputum for months, ? Post viral- Dr Gomez prescribed pred taper and it improved but did not resolve.  Her last labs were in February and we will obtain new labs in coming days.  She is quarantining as able and feels well.   She denies chest pain or shortness of breath. She has some swelling every few days for which she takes an extra furosemide.      Home BP: most 130-135, occasionally up to 150     Baseline Cr: 1.8-2.2    ROS:  A comprehensive review of systems was obtained and negative, except as noted in the HPI or PMH.    Active Medical Problems:  Patient Active Problem List   Diagnosis     Degeneration of cervical intervertebral disc     Nonallopathic lesion of cervical region     Nonallopathic lesion of thoracic region     CAD s/p PCI+BMS to MRCA 10/2002, PCI to mLCX 2/2003, PCI+DESx2 to pLAD 11/2007, PCI+DESx1 to dRCA 12/2007, PCI+ALVAREZ to RPDA 7/2013; on indefinite DAPT w/o angina     Dizziness and giddiness     Edema     Esophageal reflux     Gout     Essential hypertension     Obstructive sleep apnea     Osteomalacia     Post-menopausal osteoporosis     Cardiac Pacemaker- Medtronic, dual chamber- NOT dependant     Transient complete heart block (H)     Sinus node dysfunction (H)     Disturbance of skin sensation     NSTEMI (non-ST elevated myocardial infarction) (H)     Arrhythmia     Sick sinus syndrome (H)     Non-rheumatic mitral regurgitation     Non-rheumatic tricuspid valve insufficiency       Personal Hx:   Social History     Socioeconomic History     Marital status:      Spouse name: Not on file     Number of children: 4     Years of education: Not on file     Highest education level: Not on file   Occupational History     Employer: [employer]   Social Needs     Financial resource strain: Not on file     Food insecurity     Worry: Not on file     Inability: Not on file     Transportation needs     Medical: Not on file     Non-medical: Not on file   Tobacco  "Use     Smoking status: Former Smoker     Packs/day: 0.30     Years: 15.00     Pack years: 4.50     Types: Cigarettes     Smokeless tobacco: Never Used     Tobacco comment: Quit 22+ years ago   Substance and Sexual Activity     Alcohol use: Not on file     Drug use: No     Sexual activity: Not Currently     Partners: Male     Birth control/protection: Post-menopausal   Lifestyle     Physical activity     Days per week: Not on file     Minutes per session: Not on file     Stress: Not on file   Relationships     Social connections     Talks on phone: Not on file     Gets together: Not on file     Attends Jewish service: Not on file     Active member of club or organization: Not on file     Attends meetings of clubs or organizations: Not on file     Relationship status: Not on file     Intimate partner violence     Fear of current or ex partner: Not on file     Emotionally abused: Not on file     Physically abused: Not on file     Forced sexual activity: Not on file   Other Topics Concern      Service Not Asked     Blood Transfusions Yes     Caffeine Concern Not Asked     Occupational Exposure Not Asked     Hobby Hazards Not Asked     Sleep Concern Not Asked     Stress Concern Not Asked     Weight Concern Not Asked     Special Diet Not Asked     Back Care Not Asked     Exercise No     Bike Helmet Not Asked     Seat Belt Not Asked     Self-Exams Not Asked     Parent/sibling w/ CABG, MI or angioplasty before 65F 55M? Not Asked   Social History Narrative     Not on file       Allergies:  Allergies   Allergen Reactions     Bactrim [Sulfamethoxazole W/Trimethoprim] Other (See Comments)     Patient unable to recall     Biaxin [Clarithromycin]      Chlorthalidone Nausea and Vomiting     Clonidine      Codeine Sulfate Itching     Darvon [Propoxyphene Hcl]      Dilaudid [Hydromorphone] Visual Disturbance     Hallucinations       Gabapentin Other (See Comments) and Fatigue     \"felt drunk\"     Indocin      Levaquin " [Levofloxacin Hemihydrate]      Lyrica Fatigue     Morphine Sulfate      Percocet [Oxycodone-Acetaminophen] Other (See Comments)     hallucinations     Pregabalin Itching     Other reaction(s): Fatigue     Shrimp Swelling     Spironolactone Other (See Comments)     Dehydrated       Terazosin      Ciprofloxacin Rash     Simvastatin Palpitations     Muscle weakness, leg cramping         Current Outpatient Medications   Medication     allopurinol (ZYLOPRIM) 100 MG tablet     aspirin 81 MG tablet     clopidogrel (PLAVIX) 75 MG tablet     cyanocolbalamin (VITAMIN B-12) 1000 MCG tablet     furosemide (LASIX) 40 MG tablet     isosorbide mononitrate (ISMO/MONOKET) 20 MG tablet     metoprolol tartrate (LOPRESSOR) 100 MG tablet     Multiple Vitamins-Minerals (PRESERVISION AREDS 2+MULTI VIT PO)     NITROSTAT 0.4 MG sublingual tablet     omeprazole (PRILOSEC) 40 MG DR capsule     potassium chloride ER (K-TAB/KLOR-CON) 10 MEQ CR tablet     pravastatin (PRAVACHOL) 80 MG tablet     Vitamin D3 (CHOLECALCIFEROL) 25 mcg (1000 units) tablet     timolol maleate (ISTALOL) 0.5 % opthalmic solution     No current facility-administered medications for this visit.        Vitals:  There were no vitals taken for this visit.    Exam:  General: alert, oriented, conversant  Speaks in full sentences without audible dyspnea or wheeze  Neuro: normal speech  Psych: conversant, normal affect and thought process      Results:  Last Comprehensive Metabolic Panel:  Sodium   Date Value Ref Range Status   02/13/2020 137 133 - 144 mmol/L Final     Potassium   Date Value Ref Range Status   02/13/2020 4.5 3.4 - 5.3 mmol/L Final     Chloride   Date Value Ref Range Status   02/13/2020 105 94 - 109 mmol/L Final     Carbon Dioxide   Date Value Ref Range Status   02/13/2020 26 20 - 32 mmol/L Final     Anion Gap   Date Value Ref Range Status   02/13/2020 6 3 - 14 mmol/L Final     Glucose   Date Value Ref Range Status   02/13/2020 110 (H) 70 - 99 mg/dL Final      Urea Nitrogen   Date Value Ref Range Status   02/13/2020 43 (H) 7 - 30 mg/dL Final     Creatinine   Date Value Ref Range Status   02/13/2020 1.90 (H) 0.52 - 1.04 mg/dL Final     GFR Estimate   Date Value Ref Range Status   02/13/2020 24 (L) >60 mL/min/[1.73_m2] Final     Comment:     Non  GFR Calc  Starting 12/18/2018, serum creatinine based estimated GFR (eGFR) will be   calculated using the Chronic Kidney Disease Epidemiology Collaboration   (CKD-EPI) equation.       Calcium   Date Value Ref Range Status   02/13/2020 8.6 8.5 - 10.1 mg/dL Final       Last Basic Metabolic Panel:  Lab Results   Component Value Date     11/08/2017      Lab Results   Component Value Date    POTASSIUM 3.5 11/08/2017     Lab Results   Component Value Date    CHLORIDE 104 11/08/2017     Lab Results   Component Value Date    ALEXANDRO 8.8 11/08/2017     Lab Results   Component Value Date    CO2 26 11/08/2017     Lab Results   Component Value Date    BUN 54 11/08/2017     Lab Results   Component Value Date    CR 2.36 11/08/2017     Lab Results   Component Value Date    GLC 88 11/08/2017         Marzena Martin

## 2020-08-19 DIAGNOSIS — R07.9 CHEST PAIN, UNSPECIFIED TYPE: ICD-10-CM

## 2020-08-24 NOTE — TELEPHONE ENCOUNTER
isosorbide mononitrate (ISMO/MONOKET) 20 MG tablet      Last Written Prescription Date:  Historical     Last Office Visit : 6/17/2020  Future Office visit:  9/10/2020    Routing refill request to provider for review/approval because:  Medication is reported/historical

## 2020-08-25 RX ORDER — ISOSORBIDE MONONITRATE 20 MG/1
40 TABLET ORAL 3 TIMES DAILY
Qty: 540 TABLET | Refills: 0 | Status: SHIPPED | OUTPATIENT
Start: 2020-08-25 | End: 2021-01-08

## 2020-08-26 DIAGNOSIS — I10 ESSENTIAL HYPERTENSION: ICD-10-CM

## 2020-08-26 DIAGNOSIS — N18.4 STAGE 4 CHRONIC KIDNEY DISEASE (H): ICD-10-CM

## 2020-08-26 LAB
ERYTHROCYTE [DISTWIDTH] IN BLOOD BY AUTOMATED COUNT: 14.9 % (ref 10–15)
HCT VFR BLD AUTO: 36 % (ref 35–47)
HGB BLD-MCNC: 11.2 G/DL (ref 11.7–15.7)
MCH RBC QN AUTO: 31.3 PG (ref 26.5–33)
MCHC RBC AUTO-ENTMCNC: 31.1 G/DL (ref 31.5–36.5)
MCV RBC AUTO: 101 FL (ref 78–100)
PLATELET # BLD AUTO: 113 10E9/L (ref 150–450)
PTH-INTACT SERPL-MCNC: 192 PG/ML (ref 18–80)
RBC # BLD AUTO: 3.58 10E12/L (ref 3.8–5.2)
WBC # BLD AUTO: 5.4 10E9/L (ref 4–11)

## 2020-08-26 PROCEDURE — 83970 ASSAY OF PARATHORMONE: CPT | Performed by: INTERNAL MEDICINE

## 2020-08-26 PROCEDURE — 80061 LIPID PANEL: CPT | Performed by: INTERNAL MEDICINE

## 2020-08-26 PROCEDURE — 80069 RENAL FUNCTION PANEL: CPT | Performed by: INTERNAL MEDICINE

## 2020-08-26 PROCEDURE — 36415 COLL VENOUS BLD VENIPUNCTURE: CPT | Performed by: INTERNAL MEDICINE

## 2020-08-26 PROCEDURE — 82306 VITAMIN D 25 HYDROXY: CPT | Performed by: INTERNAL MEDICINE

## 2020-08-26 PROCEDURE — 82043 UR ALBUMIN QUANTITATIVE: CPT | Performed by: INTERNAL MEDICINE

## 2020-08-26 PROCEDURE — 85027 COMPLETE CBC AUTOMATED: CPT | Performed by: INTERNAL MEDICINE

## 2020-08-27 LAB
ALBUMIN SERPL-MCNC: 3.6 G/DL (ref 3.4–5)
ANION GAP SERPL CALCULATED.3IONS-SCNC: 8 MMOL/L (ref 3–14)
BUN SERPL-MCNC: 41 MG/DL (ref 7–30)
CALCIUM SERPL-MCNC: 9 MG/DL (ref 8.5–10.1)
CHLORIDE SERPL-SCNC: 111 MMOL/L (ref 94–109)
CHOLEST SERPL-MCNC: 153 MG/DL
CO2 SERPL-SCNC: 24 MMOL/L (ref 20–32)
CREAT SERPL-MCNC: 1.7 MG/DL (ref 0.52–1.04)
CREAT UR-MCNC: 71 MG/DL
DEPRECATED CALCIDIOL+CALCIFEROL SERPL-MC: 45 UG/L (ref 20–75)
GFR SERPL CREATININE-BSD FRML MDRD: 27 ML/MIN/{1.73_M2}
GLUCOSE SERPL-MCNC: 84 MG/DL (ref 70–99)
HDLC SERPL-MCNC: 52 MG/DL
LDLC SERPL CALC-MCNC: 71 MG/DL
MICROALBUMIN UR-MCNC: 80 MG/L
MICROALBUMIN/CREAT UR: 113.72 MG/G CR (ref 0–25)
NONHDLC SERPL-MCNC: 101 MG/DL
PHOSPHATE SERPL-MCNC: 3.6 MG/DL (ref 2.5–4.5)
POTASSIUM SERPL-SCNC: 4.4 MMOL/L (ref 3.4–5.3)
SODIUM SERPL-SCNC: 141 MMOL/L (ref 133–144)
TRIGL SERPL-MCNC: 149 MG/DL

## 2020-09-10 ENCOUNTER — ANCILLARY PROCEDURE (OUTPATIENT)
Dept: GENERAL RADIOLOGY | Facility: CLINIC | Age: 85
End: 2020-09-10
Attending: FAMILY MEDICINE
Payer: COMMERCIAL

## 2020-09-10 ENCOUNTER — OFFICE VISIT (OUTPATIENT)
Dept: FAMILY MEDICINE | Facility: CLINIC | Age: 85
End: 2020-09-10
Payer: COMMERCIAL

## 2020-09-10 VITALS
SYSTOLIC BLOOD PRESSURE: 179 MMHG | WEIGHT: 165 LBS | DIASTOLIC BLOOD PRESSURE: 78 MMHG | BODY MASS INDEX: 25.84 KG/M2 | HEART RATE: 87 BPM | OXYGEN SATURATION: 100 %

## 2020-09-10 DIAGNOSIS — R05.9 COUGH: ICD-10-CM

## 2020-09-10 DIAGNOSIS — R05.9 COUGH: Primary | ICD-10-CM

## 2020-09-10 PROCEDURE — 71046 X-RAY EXAM CHEST 2 VIEWS: CPT | Mod: FY

## 2020-09-10 RX ORDER — METHYLPREDNISOLONE 4 MG
TABLET, DOSE PACK ORAL
Qty: 21 TABLET | Refills: 0 | Status: SHIPPED | OUTPATIENT
Start: 2020-09-10 | End: 2021-06-15

## 2020-09-10 ASSESSMENT — PAIN SCALES - GENERAL: PAINLEVEL: NO PAIN (0)

## 2020-09-10 NOTE — PROGRESS NOTES
SUBJECTIVE:    Pt is a 85 year old female with pmh of     Patient Active Problem List   Diagnosis     Degeneration of cervical intervertebral disc     Nonallopathic lesion of cervical region     Nonallopathic lesion of thoracic region     CAD s/p PCI+BMS to MRCA 10/2002, PCI to mLCX 2/2003, PCI+DESx2 to pLAD 11/2007, PCI+DESx1 to dRCA 12/2007, PCI+ALVAREZ to RPDA 7/2013; on indefinite DAPT w/o angina     Dizziness and giddiness     Edema     Esophageal reflux     Gout     Essential hypertension     Obstructive sleep apnea     Osteomalacia     Post-menopausal osteoporosis     Cardiac Pacemaker- Medtronic, dual chamber- NOT dependant     Transient complete heart block (H)     Sinus node dysfunction (H)     Disturbance of skin sensation     NSTEMI (non-ST elevated myocardial infarction) (H)     Arrhythmia     Sick sinus syndrome (H)     Non-rheumatic mitral regurgitation     Non-rheumatic tricuspid valve insufficiency       who is here for evaluation of had concerns including Recheck Medication (pt here to follow up form 6/17 virtual).    Cough better w/ medrol but not gone  No fever or dyspnea  Worst when first lies down  On GERD meds no obvious gerd sx    No post nasal drg    Not on ace/arb    Past Medical History:   Diagnosis Date     CAD (coronary artery disease)      CAD s/p PCI+BMS to MRCA 10/2002, PCI to mLCX 2/2003, PCI+DESx2 to pLAD 11/2007, PCI+DESx1 to dRCA 12/2007, PCI+ALVAREZ to RPDA 7/2013; on indefinite DAPT  10/27/2002    10/27/2002: AMI s/p PCI+BMS (3.5x18mm Bx velocity) to mRCA 2/5/2003: Back pain; PCI+stent to mLCX c/b distal embolization/slow flow 4/11/2003: Unstable angina; PCI+stent (Hepacoat velocity) to mLAD 11/27/2007: PCI+DESx2 to pLAD (IVUS w/ calcification of LMCA and ulcerated plaque pLAD, 80% dRCA; also had 80% dLCX, too small to stent) 12/11/2007: Staged PCI. PCI+DESx1 (3.5x13mm Cypher) to dRCA (indefinite DAPT recommended at this time) 6/27/2008: Angina. mLCX stent 70% ISR. LAD and RCA stents  "patent. Myocardium at risk from LCX felt to be small, medical management preferred to PCI. 11/10/2009: Angina. Findings unchanged from 6/27/2008, FFR LAD 0.90. Medical management recommended. 7/23/2013: Unstable angina; PCI+ALVAREZ to mPDA. Diagnostic findings: LMCA 40% distal. LAD: pLAD stents patent mLAD 30% ISR dLAD 50% diffuse, D2 diffuse disease. LCX 80% mid ISR. RCA diffuse <30% mid, RPLAs diffuse disease, RPDA 100% occlusion.       Cardiac Pacemaker- Medtronic, dual chamber- NOT dependant 11/28/2007     CKD (chronic kidney disease), stage IV (H)      Hyperlipidemia LDL goal <70      Hypertension      Stented coronary artery 10-    RCA     Stented coronary artery 2-5-2003    LCx     Stented coronary artery 4-    LAD     Stented coronary artery 11-    LAD     Stented coronary artery 12-    RCA     Transient complete heart block (H) 11/28/2007     Past Surgical History:   Procedure Laterality Date     CHOLECYSTECTOMY       EP PACEMAKER N/A 10/15/2019    Procedure: EP PACEMAKER;  Surgeon: Anthony Zhu MD;  Location:  [facility]     HC PPM INSERTION NEW/REPLACEMENT W/ ATRIAL&VENTRICULAR LEAD  11-     HYSTERECTOMY       Allergies   Allergen Reactions     Bactrim [Sulfamethoxazole W/Trimethoprim] Other (See Comments)     Patient unable to recall     Biaxin [Clarithromycin]      Chlorthalidone Nausea and Vomiting     Clonidine      Codeine Sulfate Itching     Darvon [Propoxyphene Hcl]      Dilaudid [Hydromorphone] Visual Disturbance     Hallucinations       Gabapentin Other (See Comments) and Fatigue     \"felt drunk\"     Indocin      Levaquin [Levofloxacin Hemihydrate]      Lyrica Fatigue     Morphine Sulfate      Percocet [Oxycodone-Acetaminophen] Other (See Comments)     hallucinations     Pregabalin Itching     Other reaction(s): Fatigue     Shrimp Swelling     Spironolactone Other (See Comments)     Dehydrated       Terazosin      Ciprofloxacin Rash     " Simvastatin Palpitations     Muscle weakness, leg cramping                   Current Outpatient Medications   Medication Sig Dispense Refill     allopurinol (ZYLOPRIM) 100 MG tablet Take 1 tablet (100 mg) by mouth daily 90 tablet 3     aspirin 81 MG tablet Take 1 tablet by mouth daily.       clopidogrel (PLAVIX) 75 MG tablet Take 1 tablet (75 mg) by mouth daily 90 tablet 3     cyanocolbalamin (VITAMIN B-12) 1000 MCG tablet Take 500 mcg by mouth daily As directed       furosemide (LASIX) 40 MG tablet Take 1 tablet (40 mg) by mouth 2 times daily 60 tablet 11     isosorbide mononitrate (ISMO/MONOKET) 20 MG tablet Take 2 tablets (40 mg) by mouth 3 times daily 540 tablet 0     methylPREDNISolone (MEDROL DOSEPAK) 4 MG tablet therapy pack Follow Package Directions 21 tablet 0     metoprolol tartrate (LOPRESSOR) 100 MG tablet Take 1 tablet (100 mg) by mouth 2 times daily 180 tablet 3     Multiple Vitamins-Minerals (PRESERVISION AREDS 2+MULTI VIT PO) Take by mouth 2 times daily       NITROSTAT 0.4 MG sublingual tablet DISSOLVE ONE TABLET UNDER THE TONGUE EVERY 5 MINUTES AS NEEDED FOR CHEST PAIN.  DO NOT EXCEED A TOTAL OF 3 DOSES IN 15 MINUTES. CALL 911. 25 tablet 3     omeprazole (PRILOSEC) 40 MG DR capsule TAKE ONE CAPSULE BY MOUTH ONCE DAILY 90 capsule 3     potassium chloride ER (K-TAB/KLOR-CON) 10 MEQ CR tablet TAKE TWO TABLETS BY MOUTH DAILY IN THE MORNING AND ONE TABLET IN THE EVENING 270 tablet 3     pravastatin (PRAVACHOL) 80 MG tablet Take 1 tablet (80 mg) by mouth daily Needs lab 90 tablet 0     timolol maleate (ISTALOL) 0.5 % opthalmic solution INSTILL 1 INTO EACH EYE ONCE DAILY IN THE MORNING       Vitamin D3 (CHOLECALCIFEROL) 25 mcg (1000 units) tablet Take 1 tablet (25 mcg) by mouth daily 30 tablet 11       Social History     Tobacco Use     Smoking status: Former Smoker     Packs/day: 0.30     Years: 15.00     Pack years: 4.50     Types: Cigarettes     Smokeless tobacco: Never Used     Tobacco comment: Quit  22+ years ago   Substance Use Topics     Alcohol use: None     Drug use: No           OBJECTIVE:  BP (!) 179/78 (BP Location: Right arm, Patient Position: Sitting, Cuff Size: Adult Regular)   Pulse 87   Wt 74.8 kg (165 lb)   SpO2 100%   BMI 25.84 kg/m    GENERAL APPEARANCE: Alert, no acute distress  RESP: lungs clear to auscultation   CV: normal rate, regular rhythm, no murmur or gallop  MS: extremities normal, no peripheral edema  NEURO: Alert, oriented, speech and mentation normal  PSYCHE: mentation appears normal, affect and mood normal    ASSESSMENT/PLAN:    Cough  CXR normal  Medrol again  Call if still not better consider empiric atbx augmentin or doxy    DIALLO RITTER MD

## 2020-09-10 NOTE — NURSING NOTE
Chief Complaint   Patient presents with     Recheck Medication     pt here to follow up form 6/17 virtual       Paramjit Alejo CMA, EMT at 12:49 PM on 9/10/2020.

## 2020-09-10 NOTE — PATIENT INSTRUCTIONS
[facility] Medication Refill Request Information:  * Please contact your pharmacy regarding ANY request for medication refills.  ** [facility] Prescription Fax = [phone]  * Please allow 3 business days for routine medication refills.  * Please allow 5 business days for controlled substance medication refills.     [facility] Test Result notification information:  *You will be notified with in 7-10 days of your appointment day regarding the results of your test.  If you are on MyChart you will be notified as soon as the provider has reviewed the results and signed off on them.    [facility]: [phone]

## 2020-10-23 DIAGNOSIS — E78.5 HYPERLIPIDEMIA LDL GOAL <130: ICD-10-CM

## 2020-10-26 ENCOUNTER — ANCILLARY PROCEDURE (OUTPATIENT)
Dept: CARDIOLOGY | Facility: CLINIC | Age: 85
End: 2020-10-26
Attending: INTERNAL MEDICINE
Payer: COMMERCIAL

## 2020-10-26 DIAGNOSIS — Z95.0 CARDIAC PACEMAKER IN SITU: ICD-10-CM

## 2020-10-26 DIAGNOSIS — I49.5 SICK SINUS SYNDROME (H): ICD-10-CM

## 2020-10-26 PROCEDURE — 93296 REM INTERROG EVL PM/IDS: CPT

## 2020-10-26 PROCEDURE — 93294 REM INTERROG EVL PM/LDLS PM: CPT | Performed by: INTERNAL MEDICINE

## 2020-10-27 RX ORDER — PRAVASTATIN SODIUM 80 MG/1
80 TABLET ORAL DAILY
Qty: 90 TABLET | Refills: 3 | Status: SHIPPED | OUTPATIENT
Start: 2020-10-27 | End: 2021-08-11

## 2020-10-27 NOTE — TELEPHONE ENCOUNTER
Last Clinic Visit: 9/10/2020  [facility]    LDL Cholesterol Calculated   Date Value Ref Range Status   08/26/2020 71 <100 mg/dL Final     Comment:     Desirable:       <100 mg/dl

## 2020-10-30 LAB
MDC_IDC_LEAD_IMPLANT_DT: NORMAL
MDC_IDC_LEAD_IMPLANT_DT: NORMAL
MDC_IDC_LEAD_LOCATION: NORMAL
MDC_IDC_LEAD_LOCATION: NORMAL
MDC_IDC_LEAD_LOCATION_DETAIL_1: NORMAL
MDC_IDC_LEAD_LOCATION_DETAIL_1: NORMAL
MDC_IDC_LEAD_MFG: NORMAL
MDC_IDC_LEAD_MFG: NORMAL
MDC_IDC_LEAD_MODEL: NORMAL
MDC_IDC_LEAD_MODEL: NORMAL
MDC_IDC_LEAD_POLARITY_TYPE: NORMAL
MDC_IDC_LEAD_POLARITY_TYPE: NORMAL
MDC_IDC_LEAD_SERIAL: NORMAL
MDC_IDC_LEAD_SERIAL: NORMAL
MDC_IDC_LEAD_SPECIAL_FUNCTION: NORMAL
MDC_IDC_LEAD_SPECIAL_FUNCTION: NORMAL
MDC_IDC_MSMT_BATTERY_DTM: NORMAL
MDC_IDC_MSMT_BATTERY_REMAINING_LONGEVITY: 149 MO
MDC_IDC_MSMT_BATTERY_RRT_TRIGGER: 2.62
MDC_IDC_MSMT_BATTERY_STATUS: NORMAL
MDC_IDC_MSMT_BATTERY_VOLTAGE: 3.06 V
MDC_IDC_MSMT_LEADCHNL_RA_IMPEDANCE_VALUE: 323 OHM
MDC_IDC_MSMT_LEADCHNL_RA_IMPEDANCE_VALUE: 342 OHM
MDC_IDC_MSMT_LEADCHNL_RA_PACING_THRESHOLD_AMPLITUDE: 0.5 V
MDC_IDC_MSMT_LEADCHNL_RA_PACING_THRESHOLD_PULSEWIDTH: 0.4 MS
MDC_IDC_MSMT_LEADCHNL_RA_SENSING_INTR_AMPL: 2.88 MV
MDC_IDC_MSMT_LEADCHNL_RA_SENSING_INTR_AMPL: 2.88 MV
MDC_IDC_MSMT_LEADCHNL_RV_IMPEDANCE_VALUE: 399 OHM
MDC_IDC_MSMT_LEADCHNL_RV_IMPEDANCE_VALUE: 456 OHM
MDC_IDC_MSMT_LEADCHNL_RV_PACING_THRESHOLD_AMPLITUDE: 0.75 V
MDC_IDC_MSMT_LEADCHNL_RV_PACING_THRESHOLD_PULSEWIDTH: 0.4 MS
MDC_IDC_MSMT_LEADCHNL_RV_SENSING_INTR_AMPL: 9.62 MV
MDC_IDC_MSMT_LEADCHNL_RV_SENSING_INTR_AMPL: 9.62 MV
MDC_IDC_PG_IMPLANT_DTM: NORMAL
MDC_IDC_PG_MFG: NORMAL
MDC_IDC_PG_MODEL: NORMAL
MDC_IDC_PG_SERIAL: NORMAL
MDC_IDC_PG_TYPE: NORMAL
MDC_IDC_SESS_CLINIC_NAME: NORMAL
MDC_IDC_SESS_DTM: NORMAL
MDC_IDC_SESS_TYPE: NORMAL
MDC_IDC_SET_BRADY_AT_MODE_SWITCH_RATE: 171 {BEATS}/MIN
MDC_IDC_SET_BRADY_HYSTRATE: NORMAL
MDC_IDC_SET_BRADY_LOWRATE: 60 {BEATS}/MIN
MDC_IDC_SET_BRADY_MAX_SENSOR_RATE: 130 {BEATS}/MIN
MDC_IDC_SET_BRADY_MAX_TRACKING_RATE: 130 {BEATS}/MIN
MDC_IDC_SET_BRADY_MODE: NORMAL
MDC_IDC_SET_BRADY_PAV_DELAY_LOW: 180 MS
MDC_IDC_SET_BRADY_SAV_DELAY_LOW: 150 MS
MDC_IDC_SET_LEADCHNL_RA_PACING_AMPLITUDE: 1.5 V
MDC_IDC_SET_LEADCHNL_RA_PACING_ANODE_ELECTRODE_1: NORMAL
MDC_IDC_SET_LEADCHNL_RA_PACING_ANODE_LOCATION_1: NORMAL
MDC_IDC_SET_LEADCHNL_RA_PACING_CAPTURE_MODE: NORMAL
MDC_IDC_SET_LEADCHNL_RA_PACING_CATHODE_ELECTRODE_1: NORMAL
MDC_IDC_SET_LEADCHNL_RA_PACING_CATHODE_LOCATION_1: NORMAL
MDC_IDC_SET_LEADCHNL_RA_PACING_POLARITY: NORMAL
MDC_IDC_SET_LEADCHNL_RA_PACING_PULSEWIDTH: 0.4 MS
MDC_IDC_SET_LEADCHNL_RA_SENSING_ANODE_ELECTRODE_1: NORMAL
MDC_IDC_SET_LEADCHNL_RA_SENSING_ANODE_LOCATION_1: NORMAL
MDC_IDC_SET_LEADCHNL_RA_SENSING_CATHODE_ELECTRODE_1: NORMAL
MDC_IDC_SET_LEADCHNL_RA_SENSING_CATHODE_LOCATION_1: NORMAL
MDC_IDC_SET_LEADCHNL_RA_SENSING_POLARITY: NORMAL
MDC_IDC_SET_LEADCHNL_RA_SENSING_SENSITIVITY: 0.3 MV
MDC_IDC_SET_LEADCHNL_RV_PACING_AMPLITUDE: 2 V
MDC_IDC_SET_LEADCHNL_RV_PACING_ANODE_ELECTRODE_1: NORMAL
MDC_IDC_SET_LEADCHNL_RV_PACING_ANODE_LOCATION_1: NORMAL
MDC_IDC_SET_LEADCHNL_RV_PACING_CAPTURE_MODE: NORMAL
MDC_IDC_SET_LEADCHNL_RV_PACING_CATHODE_ELECTRODE_1: NORMAL
MDC_IDC_SET_LEADCHNL_RV_PACING_CATHODE_LOCATION_1: NORMAL
MDC_IDC_SET_LEADCHNL_RV_PACING_POLARITY: NORMAL
MDC_IDC_SET_LEADCHNL_RV_PACING_PULSEWIDTH: 0.4 MS
MDC_IDC_SET_LEADCHNL_RV_SENSING_ANODE_ELECTRODE_1: NORMAL
MDC_IDC_SET_LEADCHNL_RV_SENSING_ANODE_LOCATION_1: NORMAL
MDC_IDC_SET_LEADCHNL_RV_SENSING_CATHODE_ELECTRODE_1: NORMAL
MDC_IDC_SET_LEADCHNL_RV_SENSING_CATHODE_LOCATION_1: NORMAL
MDC_IDC_SET_LEADCHNL_RV_SENSING_POLARITY: NORMAL
MDC_IDC_SET_LEADCHNL_RV_SENSING_SENSITIVITY: 0.9 MV
MDC_IDC_SET_ZONE_DETECTION_INTERVAL: 350 MS
MDC_IDC_SET_ZONE_DETECTION_INTERVAL: 400 MS
MDC_IDC_SET_ZONE_TYPE: NORMAL
MDC_IDC_STAT_BRADY_AP_VP_PERCENT: 0.03 %
MDC_IDC_STAT_BRADY_AP_VS_PERCENT: 87.91 %
MDC_IDC_STAT_BRADY_AS_VP_PERCENT: 0.01 %
MDC_IDC_STAT_BRADY_AS_VS_PERCENT: 12.04 %
MDC_IDC_STAT_BRADY_DTM_END: NORMAL
MDC_IDC_STAT_BRADY_DTM_START: NORMAL
MDC_IDC_STAT_BRADY_RA_PERCENT_PACED: 88 %
MDC_IDC_STAT_BRADY_RV_PERCENT_PACED: 0.05 %
MDC_IDC_STAT_EPISODE_RECENT_COUNT: 0
MDC_IDC_STAT_EPISODE_RECENT_COUNT_DTM_END: NORMAL
MDC_IDC_STAT_EPISODE_RECENT_COUNT_DTM_START: NORMAL
MDC_IDC_STAT_EPISODE_TOTAL_COUNT: 0
MDC_IDC_STAT_EPISODE_TOTAL_COUNT: 0
MDC_IDC_STAT_EPISODE_TOTAL_COUNT: 1
MDC_IDC_STAT_EPISODE_TOTAL_COUNT_DTM_END: NORMAL
MDC_IDC_STAT_EPISODE_TOTAL_COUNT_DTM_START: NORMAL
MDC_IDC_STAT_EPISODE_TYPE: NORMAL

## 2020-11-20 NOTE — LETTER
8/10/2020       RE: Tessa Mansfield  [address]  [facility] [address]     Dear Colleague,    Thank you for referring your patient, Tessa Mansfield, to the [email or web address] at [facility]. Please see a copy of my visit note below.    Tessa Mansfield is a 85 year old female who is being evaluated via a billable telephone visit.          Assessment and Plan:   85 year old female with history of long standing HTN, CAD s/p multiple stents, who presents for followup of CKD stage 3, baseline SCr 1.8-2.2 mg/dL without proteinuria.  1. CKD stage 3- baseline SCr 1.8-2.2mg/ dL. Mild/ minimal proteinuria     - she had gained weight but now maintaing 160 lbs, stable  Her swelling is managed, she has some occasionally and takes an extra furosemide  - labs needs to be checked, will follow, has been stable, last creatinine 1.9 six months ago     2. Electrolytes/Acid Base status- normal.    Hypokalemia- takes 3 potassium pills daily   - if she has more cramps, should let us know and eat high K diet.  3. Hypertension/Volume status-  She is up a little and swelling is also worse. She is on metoprolol 100 mg BID, furosemide 40 mg twice a day with extra pill prn  Last 2D ECHO in 07/2013 showed normal LVF 65 %, no significant valvular abnormalities, normal RV function.  -patient reports history of reaction/iontolerance to Spironolactone, Chlorthalidone, Clonidine and Terazosin in the past   -patient was advised to monitor her BP daily and call nephrology clinic if her BP is persistently above 140/90 mmHg    4. Anemia- Improved. Hgb is in normal range. Iron studies were not recently checked    5. BMD  -normal serum calcium and phosphorus  -secondary hyperparathyroidism-PTH is 184. Patient had PTH of 144 in 11/17. We will recheck      Assessment and plan was discussed with patient and she voiced her understanding and agreement.        Reason for Visit:  Tessa Mansfield is a 85 year old female  Use acetaminophen and ibuprofen as needed for symptoms.  Drink plenty of fluids.  Return to the emergency department for worsening symptoms, difficulty breathing, lightheadedness, or other concerns.  Take the antibiotics as prescribed.  If you are not starting to feel better in the next 4 to 5 days schedule a virtual visit in clinic or return to the emergency department.   with HTN, who presents for CKD management.     HPI:  She is a pleasant female with PMMHx significant for stage III CKD presumed secondary to hypertensive nephrosclerosis.Her renal function has been  relatively stable, ranging from 1.8-2.2 mg/dL over the years. She has history of CAD s/p multiple stents by Dr Mir (7 stents in all) since 2004 and a pacemaker in 2007. She follows with cardiologist Dr Santoyo and Dr Zhu.  Since her last visit she has been well , without major illness, no hospitalizations . Patient states that she has not been checking her blood pressure at home.  She denies dyspnea, can be fairly active though admits to being lazy. She denies GI or  issues. Her appetite is good and her  'takes care of her'- he does most of the cooking. Patient states that she follows low sodium diet.  She had joined weight watchers and has lost a significant amount of weight and we held her amlodipine given lower BP  She now gained back some, and now stable around 160lbs. She has been baking a lot with covid  She has had a cough with some clear sputum for months, ? Post viral- Dr Gomez prescribed pred taper and it improved but did not resolve.  Her last labs were in February and we will obtain new labs in coming days.  She is quarantining as able and feels well.   She denies chest pain or shortness of breath. She has some swelling every few days for which she takes an extra furosemide.      Home BP: most 130-135, occasionally up to 150     Baseline Cr: 1.8-2.2    ROS:  A comprehensive review of systems was obtained and negative, except as noted in the HPI or PMH.    Active Medical Problems:  Patient Active Problem List   Diagnosis     Degeneration of cervical intervertebral disc     Nonallopathic lesion of cervical region     Nonallopathic lesion of thoracic region     CAD s/p PCI+BMS to MRCA 10/2002, PCI to mLCX 2/2003, PCI+DESx2 to pLAD 11/2007, PCI+DESx1 to dRCA 12/2007, PCI+ALVAREZ to RPDA 7/2013; on  indefinite DAPT w/o angina     Dizziness and giddiness     Edema     Esophageal reflux     Gout     Essential hypertension     Obstructive sleep apnea     Osteomalacia     Post-menopausal osteoporosis     Cardiac Pacemaker- Medtronic, dual chamber- NOT dependant     Transient complete heart block (H)     Sinus node dysfunction (H)     Disturbance of skin sensation     NSTEMI (non-ST elevated myocardial infarction) (H)     Arrhythmia     Sick sinus syndrome (H)     Non-rheumatic mitral regurgitation     Non-rheumatic tricuspid valve insufficiency       Personal Hx:   Social History     Socioeconomic History     Marital status:      Spouse name: Not on file     Number of children: 4     Years of education: Not on file     Highest education level: Not on file   Occupational History     Employer: [employer]   Social Needs     Financial resource strain: Not on file     Food insecurity     Worry: Not on file     Inability: Not on file     Transportation needs     Medical: Not on file     Non-medical: Not on file   Tobacco Use     Smoking status: Former Smoker     Packs/day: 0.30     Years: 15.00     Pack years: 4.50     Types: Cigarettes     Smokeless tobacco: Never Used     Tobacco comment: Quit 22+ years ago   Substance and Sexual Activity     Alcohol use: Not on file     Drug use: No     Sexual activity: Not Currently     Partners: Male     Birth control/protection: Post-menopausal   Lifestyle     Physical activity     Days per week: Not on file     Minutes per session: Not on file     Stress: Not on file   Relationships     Social connections     Talks on phone: Not on file     Gets together: Not on file     Attends Orthodoxy service: Not on file     Active member of club or organization: Not on file     Attends meetings of clubs or organizations: Not on file     Relationship status: Not on file     Intimate partner violence     Fear of current or ex partner: Not on file     Emotionally abused: Not  "on file     Physically abused: Not on file     Forced sexual activity: Not on file   Other Topics Concern      Service Not Asked     Blood Transfusions Yes     Caffeine Concern Not Asked     Occupational Exposure Not Asked     Hobby Hazards Not Asked     Sleep Concern Not Asked     Stress Concern Not Asked     Weight Concern Not Asked     Special Diet Not Asked     Back Care Not Asked     Exercise No     Bike Helmet Not Asked     Seat Belt Not Asked     Self-Exams Not Asked     Parent/sibling w/ CABG, MI or angioplasty before 65F 55M? Not Asked   Social History Narrative     Not on file       Allergies:  Allergies   Allergen Reactions     Bactrim [Sulfamethoxazole W/Trimethoprim] Other (See Comments)     Patient unable to recall     Biaxin [Clarithromycin]      Chlorthalidone Nausea and Vomiting     Clonidine      Codeine Sulfate Itching     Darvon [Propoxyphene Hcl]      Dilaudid [Hydromorphone] Visual Disturbance     Hallucinations       Gabapentin Other (See Comments) and Fatigue     \"felt drunk\"     Indocin      Levaquin [Levofloxacin Hemihydrate]      Lyrica Fatigue     Morphine Sulfate      Percocet [Oxycodone-Acetaminophen] Other (See Comments)     hallucinations     Pregabalin Itching     Other reaction(s): Fatigue     Shrimp Swelling     Spironolactone Other (See Comments)     Dehydrated       Terazosin      Ciprofloxacin Rash     Simvastatin Palpitations     Muscle weakness, leg cramping         Current Outpatient Medications   Medication     allopurinol (ZYLOPRIM) 100 MG tablet     aspirin 81 MG tablet     clopidogrel (PLAVIX) 75 MG tablet     cyanocolbalamin (VITAMIN B-12) 1000 MCG tablet     furosemide (LASIX) 40 MG tablet     isosorbide mononitrate (ISMO/MONOKET) 20 MG tablet     metoprolol tartrate (LOPRESSOR) 100 MG tablet     Multiple Vitamins-Minerals (PRESERVISION AREDS 2+MULTI VIT PO)     NITROSTAT 0.4 MG sublingual tablet     omeprazole (PRILOSEC) 40 MG DR capsule     potassium chloride " ER (K-TAB/KLOR-CON) 10 MEQ CR tablet     pravastatin (PRAVACHOL) 80 MG tablet     Vitamin D3 (CHOLECALCIFEROL) 25 mcg (1000 units) tablet     timolol maleate (ISTALOL) 0.5 % opthalmic solution     No current facility-administered medications for this visit.        Vitals:  There were no vitals taken for this visit.    Exam:  General: alert, oriented, conversant  Speaks in full sentences without audible dyspnea or wheeze  Neuro: normal speech  Psych: conversant, normal affect and thought process      Results:  Last Comprehensive Metabolic Panel:  Sodium   Date Value Ref Range Status   02/13/2020 137 133 - 144 mmol/L Final     Potassium   Date Value Ref Range Status   02/13/2020 4.5 3.4 - 5.3 mmol/L Final     Chloride   Date Value Ref Range Status   02/13/2020 105 94 - 109 mmol/L Final     Carbon Dioxide   Date Value Ref Range Status   02/13/2020 26 20 - 32 mmol/L Final     Anion Gap   Date Value Ref Range Status   02/13/2020 6 3 - 14 mmol/L Final     Glucose   Date Value Ref Range Status   02/13/2020 110 (H) 70 - 99 mg/dL Final     Urea Nitrogen   Date Value Ref Range Status   02/13/2020 43 (H) 7 - 30 mg/dL Final     Creatinine   Date Value Ref Range Status   02/13/2020 1.90 (H) 0.52 - 1.04 mg/dL Final     GFR Estimate   Date Value Ref Range Status   02/13/2020 24 (L) >60 mL/min/[1.73_m2] Final     Comment:     Non  GFR Calc  Starting 12/18/2018, serum creatinine based estimated GFR (eGFR) will be   calculated using the Chronic Kidney Disease Epidemiology Collaboration   (CKD-EPI) equation.       Calcium   Date Value Ref Range Status   02/13/2020 8.6 8.5 - 10.1 mg/dL Final       Last Basic Metabolic Panel:  Lab Results   Component Value Date     11/08/2017      Lab Results   Component Value Date    POTASSIUM 3.5 11/08/2017     Lab Results   Component Value Date    CHLORIDE 104 11/08/2017     Lab Results   Component Value Date    ALEXANDRO 8.8 11/08/2017     Lab Results   Component Value Date    CO2 26  11/08/2017     Lab Results   Component Value Date    BUN 54 11/08/2017     Lab Results   Component Value Date    CR 2.36 11/08/2017     Lab Results   Component Value Date    GLC 88 11/08/2017         Marzena [place]          Again, thank you for allowing me to participate in the care of your patient.      Sincerely,    Marzena [place] MD

## 2020-12-11 ENCOUNTER — VIRTUAL VISIT (OUTPATIENT)
Dept: FAMILY MEDICINE | Facility: CLINIC | Age: 85
End: 2020-12-11
Payer: COMMERCIAL

## 2020-12-11 DIAGNOSIS — N18.4 CKD (CHRONIC KIDNEY DISEASE) STAGE 4, GFR 15-29 ML/MIN (H): ICD-10-CM

## 2020-12-11 DIAGNOSIS — M10.00 IDIOPATHIC GOUT, UNSPECIFIED CHRONICITY, UNSPECIFIED SITE: ICD-10-CM

## 2020-12-11 DIAGNOSIS — K21.9 GASTROESOPHAGEAL REFLUX DISEASE WITHOUT ESOPHAGITIS: Primary | ICD-10-CM

## 2020-12-11 DIAGNOSIS — I10 ESSENTIAL HYPERTENSION: ICD-10-CM

## 2020-12-11 PROCEDURE — 99213 OFFICE O/P EST LOW 20 MIN: CPT | Mod: 95 | Performed by: FAMILY MEDICINE

## 2020-12-11 RX ORDER — ALLOPURINOL 100 MG/1
100 TABLET ORAL DAILY
Qty: 90 TABLET | Refills: 3 | Status: SHIPPED | OUTPATIENT
Start: 2020-12-11 | End: 2021-12-15

## 2020-12-11 RX ORDER — METOPROLOL TARTRATE 100 MG
100 TABLET ORAL 2 TIMES DAILY
Qty: 180 TABLET | Refills: 3 | Status: SHIPPED | OUTPATIENT
Start: 2020-12-11 | End: 2021-12-29

## 2020-12-11 RX ORDER — OMEPRAZOLE 40 MG/1
CAPSULE, DELAYED RELEASE ORAL
Qty: 90 CAPSULE | Refills: 3 | Status: SHIPPED | OUTPATIENT
Start: 2020-12-11 | End: 2021-12-22

## 2020-12-11 NOTE — PROGRESS NOTES
"Tessa Mansfield is a 85 year old female who is being evaluated via a billable telephone visit.      The patient has been notified of following:     \"This telephone visit will be conducted via a call between you and your physician/provider. We have found that certain health care needs can be provided without the need for a physical exam.  This service lets us provide the care you need with a short phone conversation.  If a prescription is necessary we can send it directly to your pharmacy.  If lab work is needed we can place an order for that and you can then stop by our lab to have the test done at a later time.    Telephone visits are billed at different rates depending on your insurance coverage. During this emergency period, for some insurers they may be billed the same as an in-person visit.  Please reach out to your insurance provider with any questions.    If during the course of the call the physician/provider feels a telephone visit is not appropriate, you will not be charged for this service.\"    Patient has given verbal consent for Telephone visit?  Yes    What phone number would you like to be contacted at? In [facility]    How would you like to obtain your AVS? MyChart    Subjective     Tessa Mansfield is a 85 year old female who presents via phone visit today for the following health issues:    HPI  Has covid a mo ago  Mild illness  Sx gone    Had flu shot    Will do shingrix soon at [facility]    Gout, gerd well managed w/ current meds needs refills    No c/o today    Review of Systems          Objective          Vitals:  No vitals were obtained today due to virtual visit.    healthy, alert and no distress  PSYCH: Alert and oriented times 3; coherent speech, normal   rate and volume, able to articulate logical thoughts, able   to abstract reason, no tangential thoughts, no hallucinations   or delusions  Her affect is normal  RESP: No cough, no audible wheezing, able to talk in full sentences  Remainder of " exam unable to be completed due to telephone visits            Assessment/Plan:  Had covid, resolved, mild illness for her, she'll still get shots when come out  She'll get shingrix in January  Med refills done for gout, bp, gerd, meds work well/tolerated  See me in spring   12 minute call  Pedro Gomez MD

## 2020-12-11 NOTE — NURSING NOTE
Chief Complaint   Patient presents with     Recheck Medication     3 month follow up     Kimberly Nissen, EMT at 12:05 PM on 12/11/2020    Video Visit Technology for this patient: No video technology available to patient, please call patient over the phone

## 2020-12-15 NOTE — NURSING NOTE
Chief Complaint   Patient presents with     Pain     Patient is here to discuss left foot pain. Duration 1 week.      Ear Problem     Patient is here to discuss right ear pain. Duration 1 week.      Ana Luisa Love LPN at 1:18 PM on 10/11/2017.     Home

## 2021-01-05 DIAGNOSIS — N25.81 SECONDARY RENAL HYPERPARATHYROIDISM (H): ICD-10-CM

## 2021-01-05 DIAGNOSIS — E87.6 HYPOKALEMIA: ICD-10-CM

## 2021-01-05 DIAGNOSIS — N18.4 CKD (CHRONIC KIDNEY DISEASE) STAGE 4, GFR 15-29 ML/MIN (H): ICD-10-CM

## 2021-01-05 DIAGNOSIS — R07.9 CHEST PAIN, UNSPECIFIED TYPE: ICD-10-CM

## 2021-01-05 RX ORDER — POTASSIUM CHLORIDE 750 MG/1
TABLET, EXTENDED RELEASE ORAL
Qty: 270 TABLET | Refills: 0 | Status: SHIPPED | OUTPATIENT
Start: 2021-01-05 | End: 2021-04-09

## 2021-01-05 RX ORDER — CHOLECALCIFEROL (VITAMIN D3) 25 MCG
TABLET ORAL
Qty: 30 TABLET | Refills: 0 | Status: SHIPPED | OUTPATIENT
Start: 2021-01-05 | End: 2021-02-03

## 2021-01-08 RX ORDER — ISOSORBIDE MONONITRATE 20 MG/1
40 TABLET ORAL 3 TIMES DAILY
Qty: 540 TABLET | Refills: 1 | Status: SHIPPED | OUTPATIENT
Start: 2021-01-08 | End: 2021-05-11

## 2021-01-08 NOTE — TELEPHONE ENCOUNTER
isosorbide mononitrate (ISMO/MONOKET) 20 MG tablet    Take 2 tablets (40 mg) by mouth 3 times daily     Last Written Prescription Date:  8/25/20  Last Fill Quantity: 540,   # refills: 0  Last Office Visit : 12/11/20  Future Office visit:  none    Routing refill request to provider for review/approval because:    Blood pressure out of range   09/10/20 (!) 179/78

## 2021-01-20 DIAGNOSIS — Z95.820 S/P ANGIOPLASTY WITH STENT: ICD-10-CM

## 2021-01-20 DIAGNOSIS — I21.4 NSTEMI (NON-ST ELEVATED MYOCARDIAL INFARCTION) (H): ICD-10-CM

## 2021-01-22 RX ORDER — CLOPIDOGREL BISULFATE 75 MG/1
75 TABLET ORAL DAILY
Qty: 90 TABLET | Refills: 3 | Status: SHIPPED | OUTPATIENT
Start: 2021-01-22 | End: 2022-01-27

## 2021-01-22 NOTE — TELEPHONE ENCOUNTER
Clopidogrel Bisulfate 75 MG Oral Tablet      Last Written Prescription Date:  1/16/20  Last Fill Quantity: 90,   # refills: 3  Last Office Visit : 12/11/20  Future Office visit:  None scheduled    Routing refill request to provider for review/approval because:  Failed protocol as on omeprazaole not protonix  Lab Test 08/26/20  0925   HGB 11.2*     Lab Test 08/26/20  0925   *     Followed by Dr [place]  Last Hgb and Platelets from 10/15/19 within limits

## 2021-01-26 ENCOUNTER — ANCILLARY PROCEDURE (OUTPATIENT)
Dept: CARDIOLOGY | Facility: CLINIC | Age: 86
End: 2021-01-26
Attending: INTERNAL MEDICINE
Payer: COMMERCIAL

## 2021-01-26 DIAGNOSIS — I49.5 SICK SINUS SYNDROME (H): ICD-10-CM

## 2021-01-26 DIAGNOSIS — Z95.0 CARDIAC PACEMAKER IN SITU: ICD-10-CM

## 2021-01-26 PROCEDURE — 93296 REM INTERROG EVL PM/IDS: CPT

## 2021-01-26 PROCEDURE — 93294 REM INTERROG EVL PM/LDLS PM: CPT | Performed by: INTERNAL MEDICINE

## 2021-02-03 DIAGNOSIS — N18.4 CKD (CHRONIC KIDNEY DISEASE) STAGE 4, GFR 15-29 ML/MIN (H): ICD-10-CM

## 2021-02-03 DIAGNOSIS — N25.81 SECONDARY RENAL HYPERPARATHYROIDISM (H): ICD-10-CM

## 2021-02-03 RX ORDER — CHOLECALCIFEROL (VITAMIN D3) 25 MCG
TABLET ORAL
Qty: 30 TABLET | Refills: 0 | Status: SHIPPED | OUTPATIENT
Start: 2021-02-03 | End: 2021-02-12

## 2021-02-10 LAB
MDC_IDC_LEAD_IMPLANT_DT: NORMAL
MDC_IDC_LEAD_IMPLANT_DT: NORMAL
MDC_IDC_LEAD_LOCATION: NORMAL
MDC_IDC_LEAD_LOCATION: NORMAL
MDC_IDC_LEAD_LOCATION_DETAIL_1: NORMAL
MDC_IDC_LEAD_LOCATION_DETAIL_1: NORMAL
MDC_IDC_LEAD_MFG: NORMAL
MDC_IDC_LEAD_MFG: NORMAL
MDC_IDC_LEAD_MODEL: NORMAL
MDC_IDC_LEAD_MODEL: NORMAL
MDC_IDC_LEAD_POLARITY_TYPE: NORMAL
MDC_IDC_LEAD_POLARITY_TYPE: NORMAL
MDC_IDC_LEAD_SERIAL: NORMAL
MDC_IDC_LEAD_SERIAL: NORMAL
MDC_IDC_LEAD_SPECIAL_FUNCTION: NORMAL
MDC_IDC_LEAD_SPECIAL_FUNCTION: NORMAL
MDC_IDC_MSMT_BATTERY_DTM: NORMAL
MDC_IDC_MSMT_BATTERY_REMAINING_LONGEVITY: 147 MO
MDC_IDC_MSMT_BATTERY_RRT_TRIGGER: 2.62
MDC_IDC_MSMT_BATTERY_STATUS: NORMAL
MDC_IDC_MSMT_BATTERY_VOLTAGE: 3.04 V
MDC_IDC_MSMT_LEADCHNL_RA_IMPEDANCE_VALUE: 323 OHM
MDC_IDC_MSMT_LEADCHNL_RA_IMPEDANCE_VALUE: 361 OHM
MDC_IDC_MSMT_LEADCHNL_RA_PACING_THRESHOLD_AMPLITUDE: 0.62 V
MDC_IDC_MSMT_LEADCHNL_RA_PACING_THRESHOLD_PULSEWIDTH: 0.4 MS
MDC_IDC_MSMT_LEADCHNL_RA_SENSING_INTR_AMPL: 2.62 MV
MDC_IDC_MSMT_LEADCHNL_RA_SENSING_INTR_AMPL: 2.62 MV
MDC_IDC_MSMT_LEADCHNL_RV_IMPEDANCE_VALUE: 399 OHM
MDC_IDC_MSMT_LEADCHNL_RV_IMPEDANCE_VALUE: 437 OHM
MDC_IDC_MSMT_LEADCHNL_RV_PACING_THRESHOLD_AMPLITUDE: 0.88 V
MDC_IDC_MSMT_LEADCHNL_RV_PACING_THRESHOLD_PULSEWIDTH: 0.4 MS
MDC_IDC_MSMT_LEADCHNL_RV_SENSING_INTR_AMPL: 10.38 MV
MDC_IDC_MSMT_LEADCHNL_RV_SENSING_INTR_AMPL: 10.38 MV
MDC_IDC_PG_IMPLANT_DTM: NORMAL
MDC_IDC_PG_MFG: NORMAL
MDC_IDC_PG_MODEL: NORMAL
MDC_IDC_PG_SERIAL: NORMAL
MDC_IDC_PG_TYPE: NORMAL
MDC_IDC_SESS_CLINIC_NAME: NORMAL
MDC_IDC_SESS_DTM: NORMAL
MDC_IDC_SESS_TYPE: NORMAL
MDC_IDC_SET_BRADY_AT_MODE_SWITCH_RATE: 171 {BEATS}/MIN
MDC_IDC_SET_BRADY_HYSTRATE: NORMAL
MDC_IDC_SET_BRADY_LOWRATE: 60 {BEATS}/MIN
MDC_IDC_SET_BRADY_MAX_SENSOR_RATE: 130 {BEATS}/MIN
MDC_IDC_SET_BRADY_MAX_TRACKING_RATE: 130 {BEATS}/MIN
MDC_IDC_SET_BRADY_MODE: NORMAL
MDC_IDC_SET_BRADY_PAV_DELAY_LOW: 180 MS
MDC_IDC_SET_BRADY_SAV_DELAY_LOW: 150 MS
MDC_IDC_SET_LEADCHNL_RA_PACING_AMPLITUDE: 1.5 V
MDC_IDC_SET_LEADCHNL_RA_PACING_ANODE_ELECTRODE_1: NORMAL
MDC_IDC_SET_LEADCHNL_RA_PACING_ANODE_LOCATION_1: NORMAL
MDC_IDC_SET_LEADCHNL_RA_PACING_CAPTURE_MODE: NORMAL
MDC_IDC_SET_LEADCHNL_RA_PACING_CATHODE_ELECTRODE_1: NORMAL
MDC_IDC_SET_LEADCHNL_RA_PACING_CATHODE_LOCATION_1: NORMAL
MDC_IDC_SET_LEADCHNL_RA_PACING_POLARITY: NORMAL
MDC_IDC_SET_LEADCHNL_RA_PACING_PULSEWIDTH: 0.4 MS
MDC_IDC_SET_LEADCHNL_RA_SENSING_ANODE_ELECTRODE_1: NORMAL
MDC_IDC_SET_LEADCHNL_RA_SENSING_ANODE_LOCATION_1: NORMAL
MDC_IDC_SET_LEADCHNL_RA_SENSING_CATHODE_ELECTRODE_1: NORMAL
MDC_IDC_SET_LEADCHNL_RA_SENSING_CATHODE_LOCATION_1: NORMAL
MDC_IDC_SET_LEADCHNL_RA_SENSING_POLARITY: NORMAL
MDC_IDC_SET_LEADCHNL_RA_SENSING_SENSITIVITY: 0.3 MV
MDC_IDC_SET_LEADCHNL_RV_PACING_AMPLITUDE: 2 V
MDC_IDC_SET_LEADCHNL_RV_PACING_ANODE_ELECTRODE_1: NORMAL
MDC_IDC_SET_LEADCHNL_RV_PACING_ANODE_LOCATION_1: NORMAL
MDC_IDC_SET_LEADCHNL_RV_PACING_CAPTURE_MODE: NORMAL
MDC_IDC_SET_LEADCHNL_RV_PACING_CATHODE_ELECTRODE_1: NORMAL
MDC_IDC_SET_LEADCHNL_RV_PACING_CATHODE_LOCATION_1: NORMAL
MDC_IDC_SET_LEADCHNL_RV_PACING_POLARITY: NORMAL
MDC_IDC_SET_LEADCHNL_RV_PACING_PULSEWIDTH: 0.4 MS
MDC_IDC_SET_LEADCHNL_RV_SENSING_ANODE_ELECTRODE_1: NORMAL
MDC_IDC_SET_LEADCHNL_RV_SENSING_ANODE_LOCATION_1: NORMAL
MDC_IDC_SET_LEADCHNL_RV_SENSING_CATHODE_ELECTRODE_1: NORMAL
MDC_IDC_SET_LEADCHNL_RV_SENSING_CATHODE_LOCATION_1: NORMAL
MDC_IDC_SET_LEADCHNL_RV_SENSING_POLARITY: NORMAL
MDC_IDC_SET_LEADCHNL_RV_SENSING_SENSITIVITY: 0.9 MV
MDC_IDC_SET_ZONE_DETECTION_INTERVAL: 350 MS
MDC_IDC_SET_ZONE_DETECTION_INTERVAL: 400 MS
MDC_IDC_SET_ZONE_TYPE: NORMAL
MDC_IDC_STAT_BRADY_AP_VP_PERCENT: 0.03 %
MDC_IDC_STAT_BRADY_AP_VS_PERCENT: 86.3 %
MDC_IDC_STAT_BRADY_AS_VP_PERCENT: 0.01 %
MDC_IDC_STAT_BRADY_AS_VS_PERCENT: 13.66 %
MDC_IDC_STAT_BRADY_DTM_END: NORMAL
MDC_IDC_STAT_BRADY_DTM_START: NORMAL
MDC_IDC_STAT_BRADY_RA_PERCENT_PACED: 86.36 %
MDC_IDC_STAT_BRADY_RV_PERCENT_PACED: 0.04 %
MDC_IDC_STAT_EPISODE_RECENT_COUNT: 0
MDC_IDC_STAT_EPISODE_RECENT_COUNT_DTM_END: NORMAL
MDC_IDC_STAT_EPISODE_RECENT_COUNT_DTM_START: NORMAL
MDC_IDC_STAT_EPISODE_TOTAL_COUNT: 0
MDC_IDC_STAT_EPISODE_TOTAL_COUNT: 0
MDC_IDC_STAT_EPISODE_TOTAL_COUNT: 1
MDC_IDC_STAT_EPISODE_TOTAL_COUNT_DTM_END: NORMAL
MDC_IDC_STAT_EPISODE_TOTAL_COUNT_DTM_START: NORMAL
MDC_IDC_STAT_EPISODE_TYPE: NORMAL

## 2021-02-12 DIAGNOSIS — N25.81 SECONDARY RENAL HYPERPARATHYROIDISM (H): ICD-10-CM

## 2021-02-12 DIAGNOSIS — N18.4 CKD (CHRONIC KIDNEY DISEASE) STAGE 4, GFR 15-29 ML/MIN (H): ICD-10-CM

## 2021-02-12 RX ORDER — CHOLECALCIFEROL (VITAMIN D3) 25 MCG
TABLET ORAL
Qty: 30 TABLET | Refills: 0 | Status: SHIPPED | OUTPATIENT
Start: 2021-02-12 | End: 2021-04-16

## 2021-02-17 DIAGNOSIS — N18.4 CHRONIC KIDNEY DISEASE, STAGE IV (SEVERE) (H): ICD-10-CM

## 2021-02-17 DIAGNOSIS — I10 ESSENTIAL HYPERTENSION: ICD-10-CM

## 2021-02-17 RX ORDER — FUROSEMIDE 40 MG
40 TABLET ORAL 2 TIMES DAILY
Qty: 60 TABLET | Refills: 11 | Status: SHIPPED | OUTPATIENT
Start: 2021-02-17 | End: 2022-02-22

## 2021-03-15 DIAGNOSIS — I25.10 CAD (CORONARY ARTERY DISEASE): ICD-10-CM

## 2021-03-17 RX ORDER — NITROGLYCERIN 0.4 MG/1
TABLET SUBLINGUAL
Qty: 25 TABLET | Refills: 3 | Status: SHIPPED | OUTPATIENT
Start: 2021-03-17 | End: 2021-11-03

## 2021-03-17 NOTE — TELEPHONE ENCOUNTER
Last Clinic Visit: 1/22/2020  [facility]  BP above protocol parameter, FYI to clinic

## 2021-03-18 ENCOUNTER — TELEPHONE (OUTPATIENT)
Dept: CARDIOLOGY | Facility: CLINIC | Age: 86
End: 2021-03-18

## 2021-03-18 NOTE — TELEPHONE ENCOUNTER
PA Initiation    Medication: Nitrostat 0.4 mg -   Insurance Company: [employer] - Phone [phone] Fax [phone]  Pharmacy Filling the Rx: [facility] [place] [address]  Filling Pharmacy Phone: [phone]  Filling Pharmacy Fax: [phone]  Start Date: 3/18/2021

## 2021-03-18 NOTE — TELEPHONE ENCOUNTER
Prior Authorization Retail Medication Request    Medication/Dose: Nitrostat 0.4 mg   ICD code (if different than what is on RX):    Previously Tried and Failed:    Rationale:      Insurance Name:  [employer]  Insurance ID:  [id number]      Pharmacy Information (if different than what is on RX)  Name:  [facility]  Phone:  [phone]  Fax: [phone]

## 2021-03-23 NOTE — TELEPHONE ENCOUNTER
Prior Authorization Approval    Medication: Nitrostat 0.4 mg - APPROVED was approved on 3/19/2021  Effective: 2/16/2021 to 3/19/2022  Reference #:    Approved Dose/Quantity:   Insurance Company: [employer] - Phone [phone] Fax [phone]  Expected CoPay:    Pharmacy Filling the Rx: [facility] [address] [place] [address] [address]  Pharmacy Notified: Yes  Patient Notified: Comment:  **Instructed pharmacy to notify patient when script is ready to /ship.**

## 2021-04-07 DIAGNOSIS — E87.6 HYPOKALEMIA: ICD-10-CM

## 2021-04-09 RX ORDER — POTASSIUM CHLORIDE 750 MG/1
TABLET, EXTENDED RELEASE ORAL
Qty: 270 TABLET | Refills: 0 | Status: SHIPPED | OUTPATIENT
Start: 2021-04-09 | End: 2021-07-12

## 2021-04-16 DIAGNOSIS — N18.4 CKD (CHRONIC KIDNEY DISEASE) STAGE 4, GFR 15-29 ML/MIN (H): ICD-10-CM

## 2021-04-16 DIAGNOSIS — N25.81 SECONDARY RENAL HYPERPARATHYROIDISM (H): ICD-10-CM

## 2021-04-16 RX ORDER — CHOLECALCIFEROL (VITAMIN D3) 25 MCG
TABLET ORAL
Qty: 30 TABLET | Refills: 0 | Status: SHIPPED | OUTPATIENT
Start: 2021-04-16 | End: 2021-05-17

## 2021-05-11 ENCOUNTER — ANCILLARY PROCEDURE (OUTPATIENT)
Dept: CARDIOLOGY | Facility: CLINIC | Age: 86
End: 2021-05-11
Attending: INTERNAL MEDICINE
Payer: COMMERCIAL

## 2021-05-11 VITALS
WEIGHT: 155 LBS | DIASTOLIC BLOOD PRESSURE: 74 MMHG | BODY MASS INDEX: 24.33 KG/M2 | HEART RATE: 65 BPM | SYSTOLIC BLOOD PRESSURE: 178 MMHG | HEIGHT: 67 IN | OXYGEN SATURATION: 100 %

## 2021-05-11 DIAGNOSIS — Z95.0 CARDIAC PACEMAKER IN SITU: ICD-10-CM

## 2021-05-11 DIAGNOSIS — N18.4 CKD (CHRONIC KIDNEY DISEASE) STAGE 4, GFR 15-29 ML/MIN (H): ICD-10-CM

## 2021-05-11 DIAGNOSIS — I36.1 NON-RHEUMATIC TRICUSPID VALVE INSUFFICIENCY: ICD-10-CM

## 2021-05-11 DIAGNOSIS — I49.5 SICK SINUS SYNDROME (H): ICD-10-CM

## 2021-05-11 DIAGNOSIS — R07.9 CHEST PAIN, UNSPECIFIED TYPE: ICD-10-CM

## 2021-05-11 DIAGNOSIS — I12.9 RENAL HYPERTENSION: ICD-10-CM

## 2021-05-11 DIAGNOSIS — I25.118 CORONARY ARTERY DISEASE OF NATIVE ARTERY OF NATIVE HEART WITH STABLE ANGINA PECTORIS (H): Primary | ICD-10-CM

## 2021-05-11 DIAGNOSIS — I34.0 NON-RHEUMATIC MITRAL REGURGITATION: ICD-10-CM

## 2021-05-11 PROCEDURE — 93280 PM DEVICE PROGR EVAL DUAL: CPT | Performed by: INTERNAL MEDICINE

## 2021-05-11 PROCEDURE — 99214 OFFICE O/P EST MOD 30 MIN: CPT | Mod: 25 | Performed by: INTERNAL MEDICINE

## 2021-05-11 PROCEDURE — G0463 HOSPITAL OUTPT CLINIC VISIT: HCPCS

## 2021-05-11 RX ORDER — ISOSORBIDE MONONITRATE 20 MG/1
60 TABLET ORAL 2 TIMES DAILY
Qty: 540 TABLET | Refills: 1 | Status: SHIPPED | OUTPATIENT
Start: 2021-05-11 | End: 2022-02-17

## 2021-05-11 ASSESSMENT — PAIN SCALES - GENERAL: PAINLEVEL: NO PAIN (0)

## 2021-05-11 ASSESSMENT — MIFFLIN-ST. JEOR: SCORE: 1185.71

## 2021-05-11 NOTE — PROGRESS NOTES
Chief complaint: CAD, follow up    HPI:   Tessa Mansfield is a 86 year old female former patient of Dr. Mir with extensive CAD history as detailed below (PCI to all 3 vessels at various times from 6215-9572, last PCA to RPDA 7/23/13 and known 80% ISR of small LCX) on indefinite dual antiplatelet therapy and sick sinus syndrome s/p dual-chamber PPM who presents for follow up of her chronic cardiovascular conditions.     Last visit (12/10/19):  She underwent pacemaker generator change 10/15/19 due to her prior generator reaching LOLA (new device: Medtronic [id number] Beatrice XT DR MRI dual chamber PPM) without event.     TTE 10/15/19 (my read):   Normal LV/RV function, LVEF=60-65%.   At least moderate MR and moderate-to-severe TR.  RVSP~39+RAP~15 mmHg.  Compared to 7/24/13: MR and TR have worsened.     She reports volume status has been about stable and that she has self-titrated furosemide as needed and has done so for some time.     Interval history 05/11/21:  Since her last visit, Tessa reports feeling generally well. She does have occasional episodes of angina (pain between her shoulders) for which she takes 1 NTG every 2-3 months, which has been longstanding and stable.     She denies any dyspnea at rest or with exertion, PND, orthopnea, peripheral edema, palpitations, lightheadedness or syncope.     Summary of CAD history:  1.  10/27/2002: AMI s/p PCI+BMS (3.5x18mm Bx velocity) to mRCA  2. 2/5/2003: Back pain; PCI+stent to mLCX c/b distal embolization/slow flow  3. 4/11/2003: Unstable angina; PCI+stent (Hepacoat velocity) to mLAD  4. 11/27/2007: PCI+DESx2 to pLAD (IVUS w/ calcification of LMCA and ulcerated plaque pLAD, 80% dRCA; also had 80% dLCX, too small to stent)  5. 12/11/2007: Staged PCI. PCI+DESx1 (3.5x13mm Cypher) to dRCA (indefinite DAPT recommended at this time)  6. 6/27/2008: Angina. mLCX stent 70% ISR. LAD and RCA stents patent. Myocardium at risk from LCX felt to be small, medical management preferred to  PCI.  7. 11/10/2009: Angina. Findings unchanged from 6/27/2008, FFR LAD 0.90. Medical management recommended.  8. 7/23/2013: Unstable angina; PCI+ALVAREZ to mPDA. Diagnostic findings: LMCA 40% distal. LAD: pLAD stents patent mLAD 30% ISR dLAD 50% diffuse, D2 diffuse disease. LCX 80% mid ISR. RCA diffuse <30% mid, RPLAs diffuse disease, RPDA 100% occlusion.         PAST MEDICAL HISTORY:  Past Medical History:   Diagnosis Date     CAD (coronary artery disease)      CAD s/p PCI+BMS to MRCA 10/2002, PCI to mLCX 2/2003, PCI+DESx2 to pLAD 11/2007, PCI+DESx1 to dRCA 12/2007, PCI+ALVAREZ to RPDA 7/2013; on indefinite DAPT  10/27/2002    10/27/2002: AMI s/p PCI+BMS (3.5x18mm Bx velocity) to mRCA 2/5/2003: Back pain; PCI+stent to mLCX c/b distal embolization/slow flow 4/11/2003: Unstable angina; PCI+stent (Hepacoat velocity) to mLAD 11/27/2007: PCI+DESx2 to pLAD (IVUS w/ calcification of LMCA and ulcerated plaque pLAD, 80% dRCA; also had 80% dLCX, too small to stent) 12/11/2007: Staged PCI. PCI+DESx1 (3.5x13mm Cypher) to dRCA (indefinite DAPT recommended at this time) 6/27/2008: Angina. mLCX stent 70% ISR. LAD and RCA stents patent. Myocardium at risk from LCX felt to be small, medical management preferred to PCI. 11/10/2009: Angina. Findings unchanged from 6/27/2008, FFR LAD 0.90. Medical management recommended. 7/23/2013: Unstable angina; PCI+ALVAREZ to mPDA. Diagnostic findings: LMCA 40% distal. LAD: pLAD stents patent mLAD 30% ISR dLAD 50% diffuse, D2 diffuse disease. LCX 80% mid ISR. RCA diffuse <30% mid, RPLAs diffuse disease, RPDA 100% occlusion.       Cardiac Pacemaker- Medtronic, dual chamber- NOT dependant 11/28/2007     CKD (chronic kidney disease), stage IV (H)      Hyperlipidemia LDL goal <70      Hypertension      Stented coronary artery 10-    RCA     Stented coronary artery 2-5-2003    LCx     Stented coronary artery 4-    LAD     Stented coronary artery 11-    LAD     Stented coronary artery  12-    RCA     Transient complete heart block (H) 11/28/2007       CURRENT MEDICATIONS:  Current Outpatient Medications   Medication Sig Dispense Refill     allopurinol (ZYLOPRIM) 100 MG tablet Take 1 tablet (100 mg) by mouth daily 90 tablet 3     aspirin 81 MG tablet Take 1 tablet by mouth daily.       clopidogrel (PLAVIX) 75 MG tablet Take 1 tablet (75 mg) by mouth daily 90 tablet 3     cyanocolbalamin (VITAMIN B-12) 1000 MCG tablet Take 500 mcg by mouth daily As directed       furosemide (LASIX) 40 MG tablet Take 1 tablet (40 mg) by mouth 2 times daily 60 tablet 11     isosorbide mononitrate (ISMO/MONOKET) 20 MG tablet Take 2 tablets (40 mg) by mouth 3 times daily 540 tablet 1     methylPREDNISolone (MEDROL DOSEPAK) 4 MG tablet therapy pack Follow Package Directions 21 tablet 0     metoprolol tartrate (LOPRESSOR) 100 MG tablet Take 1 tablet (100 mg) by mouth 2 times daily 180 tablet 3     Multiple Vitamins-Minerals (PRESERVISION AREDS 2+MULTI VIT PO) Take by mouth 2 times daily       NITROSTAT 0.4 MG sublingual tablet DISSOLVE ONE TABLET UNDER THE TONGUE EVERY 5 MINUTES AS NEEDED FOR CHEST PAIN.  DO NOT EXCEED A TOTAL OF 3 DOSES IN 15 MINUTES. CALL 911. 25 tablet 3     omeprazole (PRILOSEC) 40 MG DR capsule TAKE ONE CAPSULE BY MOUTH ONCE DAILY 90 capsule 3     potassium chloride ER (K-TAB/KLOR-CON) 10 MEQ CR tablet TAKE 2 TABLETS BY MOUTH IN THE MORNING AND 1 TABLET IN THE EVENING 270 tablet 0     pravastatin (PRAVACHOL) 80 MG tablet Take 1 tablet (80 mg) by mouth daily 90 tablet 3     timolol maleate (ISTALOL) 0.5 % opthalmic solution INSTILL 1 INTO EACH EYE ONCE DAILY IN THE MORNING       VITAMIN D3 25 MCG (1000 UT) tablet Take 1 tablet by mouth once daily 30 tablet 0       PAST SURGICAL HISTORY:  Past Surgical History:   Procedure Laterality Date     CHOLECYSTECTOMY       EP PACEMAKER N/A 10/15/2019    Procedure: EP PACEMAKER;  Surgeon: Anthony Zhu MD;  Location:  [facility]      "HC PPM INSERTION NEW/REPLACEMENT W/ ATRIAL&VENTRICULAR LEAD  11-     HYSTERECTOMY         ALLERGIES:     Allergies   Allergen Reactions     Bactrim [Sulfamethoxazole W/Trimethoprim] Other (See Comments)     Patient unable to recall     Biaxin [Clarithromycin]      Chlorthalidone Nausea and Vomiting     Clonidine      Codeine Sulfate Itching     Darvon [Propoxyphene Hcl]      Dilaudid [Hydromorphone] Visual Disturbance     Hallucinations       Gabapentin Other (See Comments) and Fatigue     \"felt drunk\"     Indocin      Levaquin [Levofloxacin Hemihydrate]      Lyrica Fatigue     Morphine Sulfate      Percocet [Oxycodone-Acetaminophen] Other (See Comments)     hallucinations     Pregabalin Itching     Other reaction(s): Fatigue     Shrimp Swelling     Spironolactone Other (See Comments)     Dehydrated       Terazosin      Ciprofloxacin Rash     Simvastatin Palpitations     Muscle weakness, leg cramping         FAMILY HISTORY:  Family History   Problem Relation Age of Onset     Cancer Sister 82        bladder cancer     No family history of premature CAD or sudden death.    SOCIAL HISTORY:  Social History     Tobacco Use     Smoking status: Former Smoker     Packs/day: 0.30     Years: 15.00     Pack years: 4.50     Types: Cigarettes     Smokeless tobacco: Never Used     Tobacco comment: Quit 22+ years ago   Substance Use Topics     Alcohol use: Not on file     Drug use: No       ROS:   A comprehensive 14 point review of systems is negative other than as mentioned in HPI.    Exam:  BP (!) 178/74 (BP Location: Right arm)   Pulse 65   Ht 1.702 m (5' 7\")   Wt 70.3 kg (155 lb)   SpO2 100%   BMI 24.28 kg/m    GENERAL APPEARANCE: healthy, alert and no distress  EYES: no icterus, no xanthelasmas  ENT: normal palate, mucosa moist, no central cyanosis  NECK: no adenopathy, no asymmetry, masses, or scars, thyroid normal to palpation and no bruits, JVP not elevated  RESPIRATORY: lungs clear to auscultation - no rales, " rhonchi or wheezes, no use of accessory muscles, no retractions, respirations are unlabored, normal respiratory rate  CARDIOVASCULAR: regular rhythm, normal S1 with physiologic split S2, no S3 or S4 and no murmur, click or rub, precordium quiet with normal PMI.  GI: soft, non tender, without hepatosplenomegaly, no masses palpable, bowel sounds normal, aorta not enlarged by palpation, no abdominal bruits  EXTREMITIES: peripheral pulses normal, no edema, no bruits  NEURO: alert and oriented to person/place/time, normal speech, gait and affect  VASC: Radial, dorsalis pedis and posterior tibialis pulses 2+ bilaterally.  SKIN: no ecchymoses, no rashes.  PSYCH: cooperative, affect appropriate.     Labs:  Reviewed.       Testing/Procedures:  TTE 10/15/19 (my read):   Normal LV/RV function, LVEF=60-65%.   At least moderate MR and moderate-to-severe TR.  RVSP~39+RAP~15 mmHg.  Compared to 7/24/13: MR and TR have worsened.       I personally visualized:  Device check 05/11/21: Normal device function. 86.3% AP <1% . Intrinsic rhythm sinus bradycardia at 48 bpm. No atrial or ventricular arrhythmias. Lead trends stable.      Assessment and Plan:   1. CAD s/p multiple PCIs as above (last to RPDA 7/23/13) and known 80% ISR of small mLCX, with stable angina:   Tessa has very rare (every 2-3 months) chronic stable angina which is longstanding. I did instruct her to notify me immediately if her anginal symptoms become more frequent (even 1x/month), longer in duration, or more severe.   -continue DAPT indefinitely  -continue statin  -increase Imdur to 60 mg BID (currently 40 mg BID)    2. Moderate mitral regurgitation  3. Moderate-to-severe tricuspid regurgitation  -no clinical stigmata of significant MR or TR and stable functional capacity, follow clinically  -follow up in 1 year with TTE prior    4. Renal Hypertension, uncontrolled  5. CKD stage 4  -increase Imdur to 60 mg BID as above  -followed by Odilia Martin and  Scar      6. Sick sinus syndrome s/p dual chamber PPM:  Device check today shows normal device function. Continue routine device clinic follow up.     The patient states understanding and is agreeable with plan.     Tyrell Santoyo MD  Cardiology    CC  ESTABLISHED PATIENT

## 2021-05-11 NOTE — LETTER
5/11/2021      RE: Tessa Mansfield  [address]  [facility] [address]       Dear Colleague,    Thank you for the opportunity to participate in the care of your patient, Tessa Mansfield, at the [facility] [place] [facility]. Please see a copy of my visit note below.    Chief complaint: CAD, follow up    HPI:   Tessa Mansfield is a 86 year old female former patient of Dr. Mir with extensive CAD history as detailed below (PCI to all 3 vessels at various times from 5713-9908, last PCA to RPDA 7/23/13 and known 80% ISR of small LCX) on indefinite dual antiplatelet therapy and sick sinus syndrome s/p dual-chamber PPM who presents for follow up of her chronic cardiovascular conditions.     Last visit (12/10/19):  She underwent pacemaker generator change 10/15/19 due to her prior generator reaching LOLA (new device: Medtronic [id number] Beatrice XT DR MRI dual chamber PPM) without event.     TTE 10/15/19 (my read):   Normal LV/RV function, LVEF=60-65%.   At least moderate MR and moderate-to-severe TR.  RVSP~39+RAP~15 mmHg.  Compared to 7/24/13: MR and TR have worsened.     She reports volume status has been about stable and that she has self-titrated furosemide as needed and has done so for some time.     Interval history 05/11/21:  Since her last visit, Tessa reports feeling generally well. She does have occasional episodes of angina (pain between her shoulders) for which she takes 1 NTG every 2-3 months, which has been longstanding and stable.     She denies any dyspnea at rest or with exertion, PND, orthopnea, peripheral edema, palpitations, lightheadedness or syncope.     Summary of CAD history:  1.  10/27/2002: AMI s/p PCI+BMS (3.5x18mm Bx velocity) to mRCA  2. 2/5/2003: Back pain; PCI+stent to mLCX c/b distal embolization/slow flow  3. 4/11/2003: Unstable angina; PCI+stent (Hepacoat velocity) to mLAD  4. 11/27/2007: PCI+DESx2 to pLAD (IVUS w/  calcification of LMCA and ulcerated plaque pLAD, 80% dRCA; also had 80% dLCX, too small to stent)  5. 12/11/2007: Staged PCI. PCI+DESx1 (3.5x13mm Cypher) to dRCA (indefinite DAPT recommended at this time)  6. 6/27/2008: Angina. mLCX stent 70% ISR. LAD and RCA stents patent. Myocardium at risk from LCX felt to be small, medical management preferred to PCI.  7. 11/10/2009: Angina. Findings unchanged from 6/27/2008, FFR LAD 0.90. Medical management recommended.  8. 7/23/2013: Unstable angina; PCI+ALVAREZ to mPDA. Diagnostic findings: LMCA 40% distal. LAD: pLAD stents patent mLAD 30% ISR dLAD 50% diffuse, D2 diffuse disease. LCX 80% mid ISR. RCA diffuse <30% mid, RPLAs diffuse disease, RPDA 100% occlusion.         PAST MEDICAL HISTORY:  Past Medical History:   Diagnosis Date     CAD (coronary artery disease)      CAD s/p PCI+BMS to MRCA 10/2002, PCI to mLCX 2/2003, PCI+DESx2 to pLAD 11/2007, PCI+DESx1 to dRCA 12/2007, PCI+ALVAREZ to RPDA 7/2013; on indefinite DAPT  10/27/2002    10/27/2002: AMI s/p PCI+BMS (3.5x18mm Bx velocity) to mRCA 2/5/2003: Back pain; PCI+stent to mLCX c/b distal embolization/slow flow 4/11/2003: Unstable angina; PCI+stent (Hepacoat velocity) to mLAD 11/27/2007: PCI+DESx2 to pLAD (IVUS w/ calcification of LMCA and ulcerated plaque pLAD, 80% dRCA; also had 80% dLCX, too small to stent) 12/11/2007: Staged PCI. PCI+DESx1 (3.5x13mm Cypher) to dRCA (indefinite DAPT recommended at this time) 6/27/2008: Angina. mLCX stent 70% ISR. LAD and RCA stents patent. Myocardium at risk from LCX felt to be small, medical management preferred to PCI. 11/10/2009: Angina. Findings unchanged from 6/27/2008, FFR LAD 0.90. Medical management recommended. 7/23/2013: Unstable angina; PCI+ALVAREZ to mPDA. Diagnostic findings: LMCA 40% distal. LAD: pLAD stents patent mLAD 30% ISR dLAD 50% diffuse, D2 diffuse disease. LCX 80% mid ISR. RCA diffuse <30% mid, RPLAs diffuse disease, RPDA 100% occlusion.       Cardiac Pacemaker- Medtronic,  dual chamber- NOT dependant 11/28/2007     CKD (chronic kidney disease), stage IV (H)      Hyperlipidemia LDL goal <70      Hypertension      Stented coronary artery 10-    RCA     Stented coronary artery 2-5-2003    LCx     Stented coronary artery 4-    LAD     Stented coronary artery 11-    LAD     Stented coronary artery 12-    RCA     Transient complete heart block (H) 11/28/2007       CURRENT MEDICATIONS:  Current Outpatient Medications   Medication Sig Dispense Refill     allopurinol (ZYLOPRIM) 100 MG tablet Take 1 tablet (100 mg) by mouth daily 90 tablet 3     aspirin 81 MG tablet Take 1 tablet by mouth daily.       clopidogrel (PLAVIX) 75 MG tablet Take 1 tablet (75 mg) by mouth daily 90 tablet 3     cyanocolbalamin (VITAMIN B-12) 1000 MCG tablet Take 500 mcg by mouth daily As directed       furosemide (LASIX) 40 MG tablet Take 1 tablet (40 mg) by mouth 2 times daily 60 tablet 11     isosorbide mononitrate (ISMO/MONOKET) 20 MG tablet Take 2 tablets (40 mg) by mouth 3 times daily 540 tablet 1     methylPREDNISolone (MEDROL DOSEPAK) 4 MG tablet therapy pack Follow Package Directions 21 tablet 0     metoprolol tartrate (LOPRESSOR) 100 MG tablet Take 1 tablet (100 mg) by mouth 2 times daily 180 tablet 3     Multiple Vitamins-Minerals (PRESERVISION AREDS 2+MULTI VIT PO) Take by mouth 2 times daily       NITROSTAT 0.4 MG sublingual tablet DISSOLVE ONE TABLET UNDER THE TONGUE EVERY 5 MINUTES AS NEEDED FOR CHEST PAIN.  DO NOT EXCEED A TOTAL OF 3 DOSES IN 15 MINUTES. CALL 911. 25 tablet 3     omeprazole (PRILOSEC) 40 MG DR capsule TAKE ONE CAPSULE BY MOUTH ONCE DAILY 90 capsule 3     potassium chloride ER (K-TAB/KLOR-CON) 10 MEQ CR tablet TAKE 2 TABLETS BY MOUTH IN THE MORNING AND 1 TABLET IN THE EVENING 270 tablet 0     pravastatin (PRAVACHOL) 80 MG tablet Take 1 tablet (80 mg) by mouth daily 90 tablet 3     timolol maleate (ISTALOL) 0.5 % opthalmic solution INSTILL 1 INTO EACH EYE ONCE  "DAILY IN THE MORNING       VITAMIN D3 25 MCG (1000 UT) tablet Take 1 tablet by mouth once daily 30 tablet 0       PAST SURGICAL HISTORY:  Past Surgical History:   Procedure Laterality Date     CHOLECYSTECTOMY       EP PACEMAKER N/A 10/15/2019    Procedure: EP PACEMAKER;  Surgeon: Anthony Zhu MD;  Location:  [facility]     HC PPM INSERTION NEW/REPLACEMENT W/ ATRIAL&VENTRICULAR LEAD  11-     HYSTERECTOMY         ALLERGIES:     Allergies   Allergen Reactions     Bactrim [Sulfamethoxazole W/Trimethoprim] Other (See Comments)     Patient unable to recall     Biaxin [Clarithromycin]      Chlorthalidone Nausea and Vomiting     Clonidine      Codeine Sulfate Itching     Darvon [Propoxyphene Hcl]      Dilaudid [Hydromorphone] Visual Disturbance     Hallucinations       Gabapentin Other (See Comments) and Fatigue     \"felt drunk\"     Indocin      Levaquin [Levofloxacin Hemihydrate]      Lyrica Fatigue     Morphine Sulfate      Percocet [Oxycodone-Acetaminophen] Other (See Comments)     hallucinations     Pregabalin Itching     Other reaction(s): Fatigue     Shrimp Swelling     Spironolactone Other (See Comments)     Dehydrated       Terazosin      Ciprofloxacin Rash     Simvastatin Palpitations     Muscle weakness, leg cramping         FAMILY HISTORY:  Family History   Problem Relation Age of Onset     Cancer Sister 82        bladder cancer     No family history of premature CAD or sudden death.    SOCIAL HISTORY:  Social History     Tobacco Use     Smoking status: Former Smoker     Packs/day: 0.30     Years: 15.00     Pack years: 4.50     Types: Cigarettes     Smokeless tobacco: Never Used     Tobacco comment: Quit 22+ years ago   Substance Use Topics     Alcohol use: Not on file     Drug use: No       ROS:   A comprehensive 14 point review of systems is negative other than as mentioned in HPI.    Exam:  BP (!) 178/74 (BP Location: Right arm)   Pulse 65   Ht 1.702 m (5' 7\")   Wt 70.3 kg (155 " lb)   SpO2 100%   BMI 24.28 kg/m    GENERAL APPEARANCE: healthy, alert and no distress  EYES: no icterus, no xanthelasmas  ENT: normal palate, mucosa moist, no central cyanosis  NECK: no adenopathy, no asymmetry, masses, or scars, thyroid normal to palpation and no bruits, JVP not elevated  RESPIRATORY: lungs clear to auscultation - no rales, rhonchi or wheezes, no use of accessory muscles, no retractions, respirations are unlabored, normal respiratory rate  CARDIOVASCULAR: regular rhythm, normal S1 with physiologic split S2, no S3 or S4 and no murmur, click or rub, precordium quiet with normal PMI.  GI: soft, non tender, without hepatosplenomegaly, no masses palpable, bowel sounds normal, aorta not enlarged by palpation, no abdominal bruits  EXTREMITIES: peripheral pulses normal, no edema, no bruits  NEURO: alert and oriented to person/place/time, normal speech, gait and affect  VASC: Radial, dorsalis pedis and posterior tibialis pulses 2+ bilaterally.  SKIN: no ecchymoses, no rashes.  PSYCH: cooperative, affect appropriate.     Labs:  Reviewed.       Testing/Procedures:  TTE 10/15/19 (my read):   Normal LV/RV function, LVEF=60-65%.   At least moderate MR and moderate-to-severe TR.  RVSP~39+RAP~15 mmHg.  Compared to 7/24/13: MR and TR have worsened.       I personally visualized:  Device check 05/11/21: Normal device function. 86.3% AP <1% . Intrinsic rhythm sinus bradycardia at 48 bpm. No atrial or ventricular arrhythmias. Lead trends stable.      Assessment and Plan:   1. CAD s/p multiple PCIs as above (last to RPDA 7/23/13) and known 80% ISR of small mLCX, with stable angina:   Tessa has very rare (every 2-3 months) chronic stable angina which is longstanding. I did instruct her to notify me immediately if her anginal symptoms become more frequent (even 1x/month), longer in duration, or more severe.   -continue DAPT indefinitely  -continue statin  -increase Imdur to 60 mg BID (currently 40 mg BID)    2.  Moderate mitral regurgitation  3. Moderate-to-severe tricuspid regurgitation  -no clinical stigmata of significant MR or TR and stable functional capacity, follow clinically  -follow up in 1 year with TTE prior    4. Renal Hypertension, uncontrolled  5. CKD stage 4  -increase Imdur to 60 mg BID as above  -followed by Odilia Martin and Scar      6. Sick sinus syndrome s/p dual chamber PPM:  Device check today shows normal device function. Continue routine device clinic follow up.     The patient states understanding and is agreeable with plan.     Tyrell Santoyo MD  Cardiology    CC  ESTABLISHED PATIENT

## 2021-05-11 NOTE — PATIENT INSTRUCTIONS
You were seen at the [facility] clinic today.  You saw Dr. Tyrell Santoyo  Here are your Instructions:    1. Increase Imdur to 60 mg (3 pills) twice daily  2. Echocardiogram in 1 year  3. Follow up with Dr. Santoyo in 1 year        If you have questions after this visit:  Send a [employer] message or contact Subhash Mason LPN  Office:  [phone] option #1, then #4 & ask for Subhash (nurse line)  Fax:  [phone]  After Hours:  [phone] option #4 ask to speak to he on-call Cardiologist  Appointments:  [phone] option #1, then option #1

## 2021-05-11 NOTE — NURSING NOTE
Chief Complaint   Patient presents with     Follow Up     present for 2 year follow up with device check prior      Vitals were taken and medications were reconciled.     Nay Bustos RMA  4:01 PM

## 2021-05-11 NOTE — PATIENT INSTRUCTIONS
It was a pleasure to see you in clinic today. Please do not hesitate to call with any questions or concerns. You are scheduled for a remote pacemaker transmission on 8/11/21. This will happen automatically in the night. We will call in 1-2 business days to discuss the results with you. We look forward to seeing you in clinic at your next device check in 1 year.    Belen Martinez, RN  Electrophysiology Nurse Clinician  [facility]  During business hours call:  [phone]  Urgent needs after hours- please call: [phone]- select option #4 and ask for job code [phone].

## 2021-05-15 DIAGNOSIS — N18.4 CKD (CHRONIC KIDNEY DISEASE) STAGE 4, GFR 15-29 ML/MIN (H): ICD-10-CM

## 2021-05-15 DIAGNOSIS — N25.81 SECONDARY RENAL HYPERPARATHYROIDISM (H): ICD-10-CM

## 2021-05-17 RX ORDER — CHOLECALCIFEROL (VITAMIN D3) 25 MCG
TABLET ORAL
Qty: 30 TABLET | Refills: 0 | Status: SHIPPED | OUTPATIENT
Start: 2021-05-17 | End: 2021-06-21

## 2021-06-08 LAB
MDC_IDC_LEAD_IMPLANT_DT: NORMAL
MDC_IDC_LEAD_IMPLANT_DT: NORMAL
MDC_IDC_LEAD_LOCATION: NORMAL
MDC_IDC_LEAD_LOCATION: NORMAL
MDC_IDC_LEAD_LOCATION_DETAIL_1: NORMAL
MDC_IDC_LEAD_LOCATION_DETAIL_1: NORMAL
MDC_IDC_LEAD_MFG: NORMAL
MDC_IDC_LEAD_MFG: NORMAL
MDC_IDC_LEAD_MODEL: NORMAL
MDC_IDC_LEAD_MODEL: NORMAL
MDC_IDC_LEAD_POLARITY_TYPE: NORMAL
MDC_IDC_LEAD_POLARITY_TYPE: NORMAL
MDC_IDC_LEAD_SERIAL: NORMAL
MDC_IDC_LEAD_SERIAL: NORMAL
MDC_IDC_LEAD_SPECIAL_FUNCTION: NORMAL
MDC_IDC_LEAD_SPECIAL_FUNCTION: NORMAL
MDC_IDC_MSMT_BATTERY_DTM: NORMAL
MDC_IDC_MSMT_BATTERY_REMAINING_LONGEVITY: 145 MO
MDC_IDC_MSMT_BATTERY_RRT_TRIGGER: 2.62
MDC_IDC_MSMT_BATTERY_STATUS: NORMAL
MDC_IDC_MSMT_BATTERY_VOLTAGE: 3.03 V
MDC_IDC_MSMT_LEADCHNL_RA_IMPEDANCE_VALUE: 342 OHM
MDC_IDC_MSMT_LEADCHNL_RA_IMPEDANCE_VALUE: 380 OHM
MDC_IDC_MSMT_LEADCHNL_RA_PACING_THRESHOLD_AMPLITUDE: 0.5 V
MDC_IDC_MSMT_LEADCHNL_RA_PACING_THRESHOLD_PULSEWIDTH: 0.4 MS
MDC_IDC_MSMT_LEADCHNL_RA_SENSING_INTR_AMPL: 2.62 MV
MDC_IDC_MSMT_LEADCHNL_RA_SENSING_INTR_AMPL: 3.5 MV
MDC_IDC_MSMT_LEADCHNL_RV_IMPEDANCE_VALUE: 437 OHM
MDC_IDC_MSMT_LEADCHNL_RV_IMPEDANCE_VALUE: 475 OHM
MDC_IDC_MSMT_LEADCHNL_RV_PACING_THRESHOLD_AMPLITUDE: 1 V
MDC_IDC_MSMT_LEADCHNL_RV_PACING_THRESHOLD_PULSEWIDTH: 0.4 MS
MDC_IDC_MSMT_LEADCHNL_RV_SENSING_INTR_AMPL: 10.5 MV
MDC_IDC_MSMT_LEADCHNL_RV_SENSING_INTR_AMPL: 11.88 MV
MDC_IDC_PG_IMPLANT_DTM: NORMAL
MDC_IDC_PG_MFG: NORMAL
MDC_IDC_PG_MODEL: NORMAL
MDC_IDC_PG_SERIAL: NORMAL
MDC_IDC_PG_TYPE: NORMAL
MDC_IDC_SESS_CLINIC_NAME: NORMAL
MDC_IDC_SESS_DTM: NORMAL
MDC_IDC_SESS_TYPE: NORMAL
MDC_IDC_SET_BRADY_AT_MODE_SWITCH_RATE: 171 {BEATS}/MIN
MDC_IDC_SET_BRADY_HYSTRATE: NORMAL
MDC_IDC_SET_BRADY_LOWRATE: 60 {BEATS}/MIN
MDC_IDC_SET_BRADY_MAX_SENSOR_RATE: 130 {BEATS}/MIN
MDC_IDC_SET_BRADY_MAX_TRACKING_RATE: 130 {BEATS}/MIN
MDC_IDC_SET_BRADY_MODE: NORMAL
MDC_IDC_SET_BRADY_PAV_DELAY_LOW: 180 MS
MDC_IDC_SET_BRADY_SAV_DELAY_LOW: 150 MS
MDC_IDC_SET_LEADCHNL_RA_PACING_AMPLITUDE: 1.5 V
MDC_IDC_SET_LEADCHNL_RA_PACING_ANODE_ELECTRODE_1: NORMAL
MDC_IDC_SET_LEADCHNL_RA_PACING_ANODE_LOCATION_1: NORMAL
MDC_IDC_SET_LEADCHNL_RA_PACING_CAPTURE_MODE: NORMAL
MDC_IDC_SET_LEADCHNL_RA_PACING_CATHODE_ELECTRODE_1: NORMAL
MDC_IDC_SET_LEADCHNL_RA_PACING_CATHODE_LOCATION_1: NORMAL
MDC_IDC_SET_LEADCHNL_RA_PACING_POLARITY: NORMAL
MDC_IDC_SET_LEADCHNL_RA_PACING_PULSEWIDTH: 0.4 MS
MDC_IDC_SET_LEADCHNL_RA_SENSING_ANODE_ELECTRODE_1: NORMAL
MDC_IDC_SET_LEADCHNL_RA_SENSING_ANODE_LOCATION_1: NORMAL
MDC_IDC_SET_LEADCHNL_RA_SENSING_CATHODE_ELECTRODE_1: NORMAL
MDC_IDC_SET_LEADCHNL_RA_SENSING_CATHODE_LOCATION_1: NORMAL
MDC_IDC_SET_LEADCHNL_RA_SENSING_POLARITY: NORMAL
MDC_IDC_SET_LEADCHNL_RA_SENSING_SENSITIVITY: 0.3 MV
MDC_IDC_SET_LEADCHNL_RV_PACING_AMPLITUDE: 2 V
MDC_IDC_SET_LEADCHNL_RV_PACING_ANODE_ELECTRODE_1: NORMAL
MDC_IDC_SET_LEADCHNL_RV_PACING_ANODE_LOCATION_1: NORMAL
MDC_IDC_SET_LEADCHNL_RV_PACING_CAPTURE_MODE: NORMAL
MDC_IDC_SET_LEADCHNL_RV_PACING_CATHODE_ELECTRODE_1: NORMAL
MDC_IDC_SET_LEADCHNL_RV_PACING_CATHODE_LOCATION_1: NORMAL
MDC_IDC_SET_LEADCHNL_RV_PACING_POLARITY: NORMAL
MDC_IDC_SET_LEADCHNL_RV_PACING_PULSEWIDTH: 0.4 MS
MDC_IDC_SET_LEADCHNL_RV_SENSING_ANODE_ELECTRODE_1: NORMAL
MDC_IDC_SET_LEADCHNL_RV_SENSING_ANODE_LOCATION_1: NORMAL
MDC_IDC_SET_LEADCHNL_RV_SENSING_CATHODE_ELECTRODE_1: NORMAL
MDC_IDC_SET_LEADCHNL_RV_SENSING_CATHODE_LOCATION_1: NORMAL
MDC_IDC_SET_LEADCHNL_RV_SENSING_POLARITY: NORMAL
MDC_IDC_SET_LEADCHNL_RV_SENSING_SENSITIVITY: 0.9 MV
MDC_IDC_SET_ZONE_DETECTION_INTERVAL: 350 MS
MDC_IDC_SET_ZONE_DETECTION_INTERVAL: 400 MS
MDC_IDC_SET_ZONE_TYPE: NORMAL
MDC_IDC_STAT_AT_BURDEN_PERCENT: 0 %
MDC_IDC_STAT_AT_DTM_END: NORMAL
MDC_IDC_STAT_AT_DTM_START: NORMAL
MDC_IDC_STAT_BRADY_AP_VP_PERCENT: 0.03 %
MDC_IDC_STAT_BRADY_AP_VS_PERCENT: 86.16 %
MDC_IDC_STAT_BRADY_AS_VP_PERCENT: 0.01 %
MDC_IDC_STAT_BRADY_AS_VS_PERCENT: 13.79 %
MDC_IDC_STAT_BRADY_DTM_END: NORMAL
MDC_IDC_STAT_BRADY_DTM_START: NORMAL
MDC_IDC_STAT_BRADY_RA_PERCENT_PACED: 86.26 %
MDC_IDC_STAT_BRADY_RV_PERCENT_PACED: 0.05 %
MDC_IDC_STAT_EPISODE_RECENT_COUNT: 0
MDC_IDC_STAT_EPISODE_RECENT_COUNT: 0
MDC_IDC_STAT_EPISODE_RECENT_COUNT: 1
MDC_IDC_STAT_EPISODE_RECENT_COUNT_DTM_END: NORMAL
MDC_IDC_STAT_EPISODE_RECENT_COUNT_DTM_START: NORMAL
MDC_IDC_STAT_EPISODE_TOTAL_COUNT: 0
MDC_IDC_STAT_EPISODE_TOTAL_COUNT: 0
MDC_IDC_STAT_EPISODE_TOTAL_COUNT: 1
MDC_IDC_STAT_EPISODE_TOTAL_COUNT_DTM_END: NORMAL
MDC_IDC_STAT_EPISODE_TOTAL_COUNT_DTM_START: NORMAL
MDC_IDC_STAT_EPISODE_TYPE: NORMAL

## 2021-06-15 ENCOUNTER — OFFICE VISIT (OUTPATIENT)
Dept: FAMILY MEDICINE | Facility: CLINIC | Age: 86
End: 2021-06-15
Payer: COMMERCIAL

## 2021-06-15 VITALS
BODY MASS INDEX: 24.32 KG/M2 | HEART RATE: 93 BPM | OXYGEN SATURATION: 99 % | SYSTOLIC BLOOD PRESSURE: 194 MMHG | WEIGHT: 155.3 LBS | DIASTOLIC BLOOD PRESSURE: 96 MMHG

## 2021-06-15 DIAGNOSIS — R05.9 COUGH: ICD-10-CM

## 2021-06-15 PROCEDURE — G0439 PPPS, SUBSEQ VISIT: HCPCS | Performed by: FAMILY MEDICINE

## 2021-06-15 RX ORDER — METHYLPREDNISOLONE 4 MG
TABLET, DOSE PACK ORAL
Qty: 21 TABLET | Refills: 0 | Status: SHIPPED | OUTPATIENT
Start: 2021-06-15 | End: 2022-06-15

## 2021-06-15 NOTE — PROGRESS NOTES
Medicare Annual Wellness Visit         HPI     This 86 year old female presents as an established patient  Pedro Gomez who presents for an subsequent Medicare Wellness Exam.  Patient also reports 5 weeks dry cough o/w neg pulm/ent/systemic ros, ex smoker last year similar better w/ pred. Had covid shots.      Patient Active Problem List   Diagnosis     Degeneration of cervical intervertebral disc     Nonallopathic lesion of cervical region     Nonallopathic lesion of thoracic region     CAD s/p PCI+BMS to MRCA 10/2002, PCI to mLCX 2/2003, PCI+DESx2 to pLAD 11/2007, PCI+DESx1 to dRCA 12/2007, PCI+ALVAREZ to RPDA 7/2013; on indefinite DAPT w/o angina     Dizziness and giddiness     Edema     Esophageal reflux     Gout     Essential hypertension     Obstructive sleep apnea     Osteomalacia     Post-menopausal osteoporosis     Cardiac Pacemaker- Medtronic, dual chamber- NOT dependant     Transient complete heart block (H)     Sinus node dysfunction (H)     Disturbance of skin sensation     NSTEMI (non-ST elevated myocardial infarction) (H)     Arrhythmia     Sick sinus syndrome (H)     Non-rheumatic mitral regurgitation     Non-rheumatic tricuspid valve insufficiency     CKD (chronic kidney disease) stage 4, GFR 15-29 ml/min (H)       Past Medical History:   Diagnosis Date     CAD (coronary artery disease)      CAD s/p PCI+BMS to MRCA 10/2002, PCI to mLCX 2/2003, PCI+DESx2 to pLAD 11/2007, PCI+DESx1 to dRCA 12/2007, PCI+ALVAREZ to RPDA 7/2013; on indefinite DAPT  10/27/2002    10/27/2002: AMI s/p PCI+BMS (3.5x18mm Bx velocity) to mRCA 2/5/2003: Back pain; PCI+stent to mLCX c/b distal embolization/slow flow 4/11/2003: Unstable angina; PCI+stent (Hepacoat velocity) to mLAD 11/27/2007: PCI+DESx2 to pLAD (IVUS w/ calcification of LMCA and ulcerated plaque pLAD, 80% dRCA; also had 80% dLCX, too small to stent) 12/11/2007: Staged PCI. PCI+DESx1 (3.5x13mm Cypher) to dRCA (indefinite DAPT recommended at this time) 6/27/2008:  Angina. mLCX stent 70% ISR. LAD and RCA stents patent. Myocardium at risk from LCX felt to be small, medical management preferred to PCI. 11/10/2009: Angina. Findings unchanged from 6/27/2008, FFR LAD 0.90. Medical management recommended. 7/23/2013: Unstable angina; PCI+ALVAREZ to mPDA. Diagnostic findings: LMCA 40% distal. LAD: pLAD stents patent mLAD 30% ISR dLAD 50% diffuse, D2 diffuse disease. LCX 80% mid ISR. RCA diffuse <30% mid, RPLAs diffuse disease, RPDA 100% occlusion.       Cardiac Pacemaker- Medtronic, dual chamber- NOT dependant 11/28/2007     CKD (chronic kidney disease), stage IV (H)      Hyperlipidemia LDL goal <70      Hypertension      Stented coronary artery 10-    RCA     Stented coronary artery 2-5-2003    LCx     Stented coronary artery 4-    LAD     Stented coronary artery 11-    LAD     Stented coronary artery 12-    RCA     Transient complete heart block (H) 11/28/2007        Family History   Problem Relation Age of Onset     Cancer Sister 82        bladder cancer         Past Surgical History:   Procedure Laterality Date     CHOLECYSTECTOMY       EP PACEMAKER N/A 10/15/2019    Procedure: EP PACEMAKER;  Surgeon: Anthony Zhu MD;  Location:  [facility]     HC PPM INSERTION NEW/REPLACEMENT W/ ATRIAL&VENTRICULAR LEAD  11-     HYSTERECTOMY         Reviewed no other significant FH    Family History and past Medical History reviewed and it is unchanged/updated.       Review of Systems     Ten point ROS completed otherwise negative              Medical Care     Have you been to an ER or a hospital in the last year? No  What other specialists or organizations are involved in your medical care?  N/A   Current providers sharing in care for this patient include:  Patient Care Team:  Pedro Gomez MD as PCP - General (Family Practice)  Adriana Lilly MD as MD (Urology)  Lilly Ritchie, RN as Nurse Coordinator  Tyrell Santoyo,  MD as MD (Cardiology)  Subhash Mason LPN as LPN (Cardiology)  Marzena Martin MD as Assigned Nephrology Provider  Tyrell Santoyo MD as Assigned Heart and Vascular Provider  Pedro Gomez MD as Assigned PCP         Social History     Social History     Tobacco Use     Smoking status: Former Smoker     Packs/day: 0.30     Years: 15.00     Pack years: 4.50     Types: Cigarettes     Smokeless tobacco: Never Used     Tobacco comment: Quit 22+ years ago   Substance Use Topics     Alcohol use: Not on file     Marital Status:  Who lives in your household?   Does your home have any of the following safety concerns? Loose rugs in the hallway, no grab bars in the bathroom, no handrails on the stairs or have poorly lit areas?  No  Do you feel threatened or controlled by a partner, ex-partner or anyone in your life? No  Has anyone hurt you physically, for example by pushing, hitting, slapping or kicking you   or forcing you to have sex? No  Do you need help with the phone, transportation, shopping, preparing meals, housework, laundry, medications or managing money? No   Have you noticed any hearing difficulties? Yes, somewhat      Risk Behaviors and Healthy Habits     How many servings of fruits and vegetables do you eat a day? 5  How often do you exercise and what do you do? 60 minutes per week  Do you frequently ride without a seatbelt? No  Do you use tobacco?  No  Do you use any other drugs? No         Do you use alcohol?No    Today's PHQ-2 Score:      Sexual Health     Are you sexually active?  No   If yes, with men, women, or both? N/A   If yes, do you more than one current partner? N/A   If yes, are you using condoms?  N/A   Have you had any sexually transmitted infections? No  Any sexual concerns? No     FOR WOMEN  What year did you stop having periods? After hysterectomy 30 years ago  Any vaginal bleeding in the last year? No  Have you ever had an abnormal Pap smear?  No    FUNCTIONAL ABILITY/SAFETY SCREENING     Fall Risk Assessment Today:      Hearing evaluation if done: No    EVALUATION OF COGNITIVE FUNCTION     Mood/affect:Normal  Appearance:Normal  Family member/caregiver input: Normal    Mini Cog Scoring   3 points   Clock Draw Test result:  Normal      SCREENING FOR PREVENTION and EARLY DETECTION     ECG (if done)not performed    Corrected Visual acuity: Sees eye MD routinely    CV Risk based on Pooled Cohort Risk:  The ASCVD Risk score (Tameka MTZ Jr., et al., 2013) failed to calculate for the following reasons:    The 2013 ASCVD risk score is only valid for ages 40 to 79    The patient has a prior MI or stroke diagnosis  {      Advanced Directives: Discussed and patient desires to to have further information and discuss with family.      Immunization History   Administered Date(s) Administered     Flu 65+ Years 10/04/2018, 10/18/2019     Influenza (H1N1) 02/09/2010     Influenza (High Dose) 3 valent vaccine 12/18/2015, 09/28/2016, 10/11/2017     Influenza (IIV3) PF 10/29/2004, 11/02/2005, 10/24/2006, 10/31/2007, 12/19/2008, 10/16/2010, 09/19/2012, 12/30/2014     Pneumo Conj 13-V (2010&after) 12/30/2014     Pneumococcal 23 valent 12/06/2002, 09/19/2012     TD (ADULT, 7+) 12/06/2002, 02/13/2004     TDAP Vaccine (Boostrix) 09/11/2012     Tdap (Adacel,Boostrix) 02/21/2013     Zoster vaccine, live 02/18/2011       Reviewed Immunization Record Today  Pneumoccocal Vaccine: No  Varicella Vaccine: No  TDaP:No         Physical Exam     Vitals: BP (!) 194/96   Pulse 93   Wt 70.4 kg (155 lb 4.8 oz)   SpO2 99%   Breastfeeding No   BMI 24.32 kg/m    BMI= Body mass index is 24.32 kg/m .  GENERAL APPEARANCE: healthy, alert and no distress  EYES: Eyes grossly normal to inspection, PERRL and conjunctivae and sclerae normal  HENT: ear canals and TM's normal, nose and mouth without ulcers or lesions, oropharynx clear and oral mucous membranes moist  NECK: no adenopathy, no asymmetry,  masses, or scars and thyroid normal to palpation  RESP: lungs clear to auscultation - no rales, rhonchi or wheezes  BREAST: normal without masses, tenderness or nipple discharge and no palpable axillary masses or adenopathy  CV: regular rate and rhythm, normal S1 S2, no S3 or S4, no murmur, click or rub, no peripheral edema and peripheral pulses strong  ABDOMEN: soft, nontender, no hepatosplenomegaly, no masses and bowel sounds normal  MS: no musculoskeletal defects are noted and gait is age appropriate without ataxia  SKIN: no suspicious lesions or rashes  NEURO: Normal strength and tone, sensory exam grossly normal, mentation intact and speech normal  PSYCH: mentation appears normal and affect normal/bright        Assessment and Plan   Do [facility] at [facility] heart cares as is cardio notes rvwd  Renal fcn stable  No changes today      Options for treatment and follow-up care were reviewed with the Tessa Mansfield and/or guardian engaged in the decision making process and verbalized understanding of the options discussed and agreed with the final plan.    DIALLO RITTER

## 2021-06-15 NOTE — NURSING NOTE
Chief Complaint   Patient presents with     RECHECK     6 month followup     Bleeding/Bruising     bilateral leg bruising and redness       NEELIMA Moody at 9:43 AM on 6/15/2021

## 2021-06-21 DIAGNOSIS — N18.4 CKD (CHRONIC KIDNEY DISEASE) STAGE 4, GFR 15-29 ML/MIN (H): ICD-10-CM

## 2021-06-21 DIAGNOSIS — N25.81 SECONDARY RENAL HYPERPARATHYROIDISM (H): ICD-10-CM

## 2021-06-21 RX ORDER — CHOLECALCIFEROL (VITAMIN D3) 25 MCG
TABLET ORAL
Qty: 30 TABLET | Refills: 0 | Status: SHIPPED | OUTPATIENT
Start: 2021-06-21 | End: 2021-07-19

## 2021-07-12 DIAGNOSIS — E87.6 HYPOKALEMIA: ICD-10-CM

## 2021-07-12 RX ORDER — POTASSIUM CHLORIDE 750 MG/1
TABLET, EXTENDED RELEASE ORAL
Qty: 270 TABLET | Refills: 0 | Status: SHIPPED | OUTPATIENT
Start: 2021-07-12 | End: 2021-10-11

## 2021-07-17 DIAGNOSIS — N18.4 CKD (CHRONIC KIDNEY DISEASE) STAGE 4, GFR 15-29 ML/MIN (H): ICD-10-CM

## 2021-07-17 DIAGNOSIS — N25.81 SECONDARY RENAL HYPERPARATHYROIDISM (H): ICD-10-CM

## 2021-07-19 RX ORDER — CHOLECALCIFEROL (VITAMIN D3) 25 MCG
TABLET ORAL
Qty: 30 TABLET | Refills: 0 | Status: SHIPPED | OUTPATIENT
Start: 2021-07-19 | End: 2021-08-23

## 2021-08-09 DIAGNOSIS — E78.5 HYPERLIPIDEMIA LDL GOAL <130: ICD-10-CM

## 2021-08-11 ENCOUNTER — ANCILLARY PROCEDURE (OUTPATIENT)
Dept: CARDIOLOGY | Facility: CLINIC | Age: 86
End: 2021-08-11
Attending: INTERNAL MEDICINE
Payer: COMMERCIAL

## 2021-08-11 DIAGNOSIS — I49.5 SICK SINUS SYNDROME (H): ICD-10-CM

## 2021-08-11 DIAGNOSIS — Z95.0 CARDIAC PACEMAKER IN SITU: ICD-10-CM

## 2021-08-11 PROCEDURE — 93296 REM INTERROG EVL PM/IDS: CPT

## 2021-08-11 PROCEDURE — 93294 REM INTERROG EVL PM/LDLS PM: CPT | Performed by: INTERNAL MEDICINE

## 2021-08-11 RX ORDER — PRAVASTATIN SODIUM 80 MG/1
80 TABLET ORAL DAILY
Qty: 90 TABLET | Refills: 3 | Status: SHIPPED | OUTPATIENT
Start: 2021-08-11 | End: 2022-08-18

## 2021-08-11 NOTE — TELEPHONE ENCOUNTER
Pravastatin Sodium 80 MG Oral Tablet  Last Written Prescription Date:  10/27/2020  Last Fill Quantity: 90,   # refills: 3  Last Office Visit : 6/15/2021  Future Office visit:  None  90 Tabs, 3 Refills sent to pharm 8/11/2021      Uma Pool RN  Central Triage Red Flags/Med Refills    Warnings Override History for pravastatin (PRAVACHOL) 80 MG tablet [[id number]]    Overridden by Elsa Cohen RN on Oct 27, 2020 12:28 PM   Allergy/Contraindication   1. SIMVASTATIN [Level: Drug Class Match] [Reason: Low risk]

## 2021-08-21 DIAGNOSIS — N25.81 SECONDARY RENAL HYPERPARATHYROIDISM (H): ICD-10-CM

## 2021-08-21 DIAGNOSIS — N18.4 CKD (CHRONIC KIDNEY DISEASE) STAGE 4, GFR 15-29 ML/MIN (H): ICD-10-CM

## 2021-08-23 RX ORDER — CHOLECALCIFEROL (VITAMIN D3) 25 MCG
TABLET ORAL
Qty: 30 TABLET | Refills: 3 | Status: SHIPPED | OUTPATIENT
Start: 2021-08-23 | End: 2021-12-27

## 2021-09-08 LAB
MDC_IDC_LEAD_IMPLANT_DT: NORMAL
MDC_IDC_LEAD_IMPLANT_DT: NORMAL
MDC_IDC_LEAD_LOCATION: NORMAL
MDC_IDC_LEAD_LOCATION: NORMAL
MDC_IDC_LEAD_LOCATION_DETAIL_1: NORMAL
MDC_IDC_LEAD_LOCATION_DETAIL_1: NORMAL
MDC_IDC_LEAD_MFG: NORMAL
MDC_IDC_LEAD_MFG: NORMAL
MDC_IDC_LEAD_MODEL: NORMAL
MDC_IDC_LEAD_MODEL: NORMAL
MDC_IDC_LEAD_POLARITY_TYPE: NORMAL
MDC_IDC_LEAD_POLARITY_TYPE: NORMAL
MDC_IDC_LEAD_SERIAL: NORMAL
MDC_IDC_LEAD_SERIAL: NORMAL
MDC_IDC_LEAD_SPECIAL_FUNCTION: NORMAL
MDC_IDC_LEAD_SPECIAL_FUNCTION: NORMAL
MDC_IDC_MSMT_BATTERY_DTM: NORMAL
MDC_IDC_MSMT_BATTERY_REMAINING_LONGEVITY: 140 MO
MDC_IDC_MSMT_BATTERY_RRT_TRIGGER: 2.62
MDC_IDC_MSMT_BATTERY_STATUS: NORMAL
MDC_IDC_MSMT_BATTERY_VOLTAGE: 3.03 V
MDC_IDC_MSMT_LEADCHNL_RA_IMPEDANCE_VALUE: 323 OHM
MDC_IDC_MSMT_LEADCHNL_RA_IMPEDANCE_VALUE: 342 OHM
MDC_IDC_MSMT_LEADCHNL_RA_PACING_THRESHOLD_AMPLITUDE: 0.5 V
MDC_IDC_MSMT_LEADCHNL_RA_PACING_THRESHOLD_PULSEWIDTH: 0.4 MS
MDC_IDC_MSMT_LEADCHNL_RA_SENSING_INTR_AMPL: 1.88 MV
MDC_IDC_MSMT_LEADCHNL_RA_SENSING_INTR_AMPL: 1.88 MV
MDC_IDC_MSMT_LEADCHNL_RV_IMPEDANCE_VALUE: 399 OHM
MDC_IDC_MSMT_LEADCHNL_RV_IMPEDANCE_VALUE: 437 OHM
MDC_IDC_MSMT_LEADCHNL_RV_PACING_THRESHOLD_AMPLITUDE: 0.88 V
MDC_IDC_MSMT_LEADCHNL_RV_PACING_THRESHOLD_PULSEWIDTH: 0.4 MS
MDC_IDC_MSMT_LEADCHNL_RV_SENSING_INTR_AMPL: 11.12 MV
MDC_IDC_MSMT_LEADCHNL_RV_SENSING_INTR_AMPL: 11.12 MV
MDC_IDC_PG_IMPLANT_DTM: NORMAL
MDC_IDC_PG_MFG: NORMAL
MDC_IDC_PG_MODEL: NORMAL
MDC_IDC_PG_SERIAL: NORMAL
MDC_IDC_PG_TYPE: NORMAL
MDC_IDC_SESS_CLINIC_NAME: NORMAL
MDC_IDC_SESS_DTM: NORMAL
MDC_IDC_SESS_TYPE: NORMAL
MDC_IDC_SET_BRADY_AT_MODE_SWITCH_RATE: 171 {BEATS}/MIN
MDC_IDC_SET_BRADY_HYSTRATE: NORMAL
MDC_IDC_SET_BRADY_LOWRATE: 60 {BEATS}/MIN
MDC_IDC_SET_BRADY_MAX_SENSOR_RATE: 130 {BEATS}/MIN
MDC_IDC_SET_BRADY_MAX_TRACKING_RATE: 130 {BEATS}/MIN
MDC_IDC_SET_BRADY_MODE: NORMAL
MDC_IDC_SET_BRADY_PAV_DELAY_LOW: 180 MS
MDC_IDC_SET_BRADY_SAV_DELAY_LOW: 150 MS
MDC_IDC_SET_LEADCHNL_RA_PACING_AMPLITUDE: 1.5 V
MDC_IDC_SET_LEADCHNL_RA_PACING_ANODE_ELECTRODE_1: NORMAL
MDC_IDC_SET_LEADCHNL_RA_PACING_ANODE_LOCATION_1: NORMAL
MDC_IDC_SET_LEADCHNL_RA_PACING_CAPTURE_MODE: NORMAL
MDC_IDC_SET_LEADCHNL_RA_PACING_CATHODE_ELECTRODE_1: NORMAL
MDC_IDC_SET_LEADCHNL_RA_PACING_CATHODE_LOCATION_1: NORMAL
MDC_IDC_SET_LEADCHNL_RA_PACING_POLARITY: NORMAL
MDC_IDC_SET_LEADCHNL_RA_PACING_PULSEWIDTH: 0.4 MS
MDC_IDC_SET_LEADCHNL_RA_SENSING_ANODE_ELECTRODE_1: NORMAL
MDC_IDC_SET_LEADCHNL_RA_SENSING_ANODE_LOCATION_1: NORMAL
MDC_IDC_SET_LEADCHNL_RA_SENSING_CATHODE_ELECTRODE_1: NORMAL
MDC_IDC_SET_LEADCHNL_RA_SENSING_CATHODE_LOCATION_1: NORMAL
MDC_IDC_SET_LEADCHNL_RA_SENSING_POLARITY: NORMAL
MDC_IDC_SET_LEADCHNL_RA_SENSING_SENSITIVITY: 0.3 MV
MDC_IDC_SET_LEADCHNL_RV_PACING_AMPLITUDE: 2 V
MDC_IDC_SET_LEADCHNL_RV_PACING_ANODE_ELECTRODE_1: NORMAL
MDC_IDC_SET_LEADCHNL_RV_PACING_ANODE_LOCATION_1: NORMAL
MDC_IDC_SET_LEADCHNL_RV_PACING_CAPTURE_MODE: NORMAL
MDC_IDC_SET_LEADCHNL_RV_PACING_CATHODE_ELECTRODE_1: NORMAL
MDC_IDC_SET_LEADCHNL_RV_PACING_CATHODE_LOCATION_1: NORMAL
MDC_IDC_SET_LEADCHNL_RV_PACING_POLARITY: NORMAL
MDC_IDC_SET_LEADCHNL_RV_PACING_PULSEWIDTH: 0.4 MS
MDC_IDC_SET_LEADCHNL_RV_SENSING_ANODE_ELECTRODE_1: NORMAL
MDC_IDC_SET_LEADCHNL_RV_SENSING_ANODE_LOCATION_1: NORMAL
MDC_IDC_SET_LEADCHNL_RV_SENSING_CATHODE_ELECTRODE_1: NORMAL
MDC_IDC_SET_LEADCHNL_RV_SENSING_CATHODE_LOCATION_1: NORMAL
MDC_IDC_SET_LEADCHNL_RV_SENSING_POLARITY: NORMAL
MDC_IDC_SET_LEADCHNL_RV_SENSING_SENSITIVITY: 0.9 MV
MDC_IDC_SET_ZONE_DETECTION_INTERVAL: 350 MS
MDC_IDC_SET_ZONE_DETECTION_INTERVAL: 400 MS
MDC_IDC_SET_ZONE_TYPE: NORMAL
MDC_IDC_STAT_BRADY_AP_VP_PERCENT: 0.02 %
MDC_IDC_STAT_BRADY_AP_VS_PERCENT: 85.07 %
MDC_IDC_STAT_BRADY_AS_VP_PERCENT: 0.02 %
MDC_IDC_STAT_BRADY_AS_VS_PERCENT: 14.88 %
MDC_IDC_STAT_BRADY_DTM_END: NORMAL
MDC_IDC_STAT_BRADY_DTM_START: NORMAL
MDC_IDC_STAT_BRADY_RA_PERCENT_PACED: 85.49 %
MDC_IDC_STAT_BRADY_RV_PERCENT_PACED: 0.05 %
MDC_IDC_STAT_EPISODE_RECENT_COUNT: 0
MDC_IDC_STAT_EPISODE_RECENT_COUNT_DTM_END: NORMAL
MDC_IDC_STAT_EPISODE_RECENT_COUNT_DTM_START: NORMAL
MDC_IDC_STAT_EPISODE_TOTAL_COUNT: 0
MDC_IDC_STAT_EPISODE_TOTAL_COUNT: 0
MDC_IDC_STAT_EPISODE_TOTAL_COUNT: 1
MDC_IDC_STAT_EPISODE_TOTAL_COUNT_DTM_END: NORMAL
MDC_IDC_STAT_EPISODE_TOTAL_COUNT_DTM_START: NORMAL
MDC_IDC_STAT_EPISODE_TYPE: NORMAL

## 2021-09-13 ENCOUNTER — OFFICE VISIT (OUTPATIENT)
Dept: NEPHROLOGY | Facility: CLINIC | Age: 86
End: 2021-09-13
Payer: COMMERCIAL

## 2021-09-13 ENCOUNTER — LAB (OUTPATIENT)
Dept: LAB | Facility: CLINIC | Age: 86
End: 2021-09-13

## 2021-09-13 VITALS
WEIGHT: 164 LBS | HEIGHT: 67 IN | SYSTOLIC BLOOD PRESSURE: 179 MMHG | BODY MASS INDEX: 25.74 KG/M2 | HEART RATE: 89 BPM | DIASTOLIC BLOOD PRESSURE: 83 MMHG | OXYGEN SATURATION: 97 %

## 2021-09-13 DIAGNOSIS — E87.6 HYPOKALEMIA: ICD-10-CM

## 2021-09-13 DIAGNOSIS — I10 ESSENTIAL HYPERTENSION: ICD-10-CM

## 2021-09-13 DIAGNOSIS — N18.4 CKD (CHRONIC KIDNEY DISEASE) STAGE 4, GFR 15-29 ML/MIN (H): Primary | ICD-10-CM

## 2021-09-13 DIAGNOSIS — N18.4 CKD (CHRONIC KIDNEY DISEASE) STAGE 4, GFR 15-29 ML/MIN (H): ICD-10-CM

## 2021-09-13 DIAGNOSIS — N25.81 SECONDARY RENAL HYPERPARATHYROIDISM (H): ICD-10-CM

## 2021-09-13 LAB
ALBUMIN SERPL-MCNC: 3.8 G/DL (ref 3.4–5)
ANION GAP SERPL CALCULATED.3IONS-SCNC: 5 MMOL/L (ref 3–14)
BUN SERPL-MCNC: 38 MG/DL (ref 7–30)
CALCIUM SERPL-MCNC: 9 MG/DL (ref 8.5–10.1)
CHLORIDE BLD-SCNC: 111 MMOL/L (ref 94–109)
CO2 SERPL-SCNC: 26 MMOL/L (ref 20–32)
CREAT SERPL-MCNC: 1.81 MG/DL (ref 0.52–1.04)
FERRITIN SERPL-MCNC: 51 NG/ML (ref 8–252)
GFR SERPL CREATININE-BSD FRML MDRD: 25 ML/MIN/1.73M2
GLUCOSE BLD-MCNC: 83 MG/DL (ref 70–99)
IRON SATN MFR SERPL: 18 % (ref 15–46)
IRON SERPL-MCNC: 62 UG/DL (ref 35–180)
PHOSPHATE SERPL-MCNC: 3.3 MG/DL (ref 2.5–4.5)
POTASSIUM BLD-SCNC: 4.4 MMOL/L (ref 3.4–5.3)
PTH-INTACT SERPL-MCNC: 241 PG/ML (ref 18–80)
SODIUM SERPL-SCNC: 142 MMOL/L (ref 133–144)
TIBC SERPL-MCNC: 337 UG/DL (ref 240–430)

## 2021-09-13 PROCEDURE — 85027 COMPLETE CBC AUTOMATED: CPT

## 2021-09-13 PROCEDURE — 83550 IRON BINDING TEST: CPT

## 2021-09-13 PROCEDURE — 82728 ASSAY OF FERRITIN: CPT

## 2021-09-13 PROCEDURE — 83970 ASSAY OF PARATHORMONE: CPT

## 2021-09-13 PROCEDURE — 82306 VITAMIN D 25 HYDROXY: CPT

## 2021-09-13 PROCEDURE — 80069 RENAL FUNCTION PANEL: CPT

## 2021-09-13 PROCEDURE — 36415 COLL VENOUS BLD VENIPUNCTURE: CPT

## 2021-09-13 PROCEDURE — 99214 OFFICE O/P EST MOD 30 MIN: CPT | Performed by: PHYSICIAN ASSISTANT

## 2021-09-13 ASSESSMENT — MIFFLIN-ST. JEOR: SCORE: 1226.53

## 2021-09-13 NOTE — LETTER
9/13/2021       RE: Tessa Mansfield  [address]  [facility] [address]     Dear Colleague,    Thank you for referring your patient, Tessa Mansfield, to the [facility] at [facility]. Please see a copy of my visit note below.    Nephrology Progress Note  9/13/21      Assessment and Plan:   85 year old female with history of long standing HTN, CAD s/p multiple stents, who presents for followup of CKD stage 3, baseline SCr 1.8-2.2 mg/dL without proteinuria.    She has not been seen in nephrology for over a year and has no recent labs    1. CKD stage 3- baseline SCr 1.8-2.2mg/ dL. Mild/ minimal proteinuria     - she had gained weight but now maintaing 160 lbs, stable  Her swelling is managed, she has some occasionally and takes an extra furosemide  - labs needs to be checked, will follow, has been stable, last creatinine 1.7 approximately one year ago     2. Electrolytes/Acid Base status- normal.    Hypokalemia- takes 3 potassium pills daily   - if she has more cramps, should let us know and eat high K diet.    3. Hypertension/Volume status-  She is up a little and swelling is also worse. She is on metoprolol 100 mg BID, furosemide 40 mg once a day with an extra pill prn,. She started isosorbide 60 mg BID in May 2021.   Last 2D ECHO in 07/2013 showed normal LVF 65 %, no significant valvular abnormalities, normal RV function.  -patient reports history of reaction/iontolerance to Spironolactone, Chlorthalidone, Clonidine and Terazosin in the past   -patient was advised to monitor her BP daily and call nephrology clinic if her BP is persistently above 140/90 mmHg  - her BP is labile, ranging from 130's to 170's systolic, was 179/83 in clinic today    4. Anemia- previously improved. Last Hgb was in normal range. Iron studies were not recently checked    5. BMD  - last serum calcium and phosphorus were normal  -secondary hyperparathyroidism-PTH was  184. Patient had PTH of 144 in 11/17. We will recheck    #Disposition: will get labs today. She will monitor BP at home in the morning and in the evening. Nurse to call in two weeks for report. No medication changes today.    Assessment and plan was discussed with patient and she voiced her understanding and agreement.        Reason for Visit:  Tessa Mansfield is an 86 year old female with HTN, who presents for CKD management.     HPI:  She is a pleasant female with PMMHx significant for stage III CKD presumed secondary to hypertensive nephrosclerosis.Her renal function has been  relatively stable, ranging from 1.8-2.2 mg/dL over the years. She has history of CAD s/p multiple stents by Dr Mir (7 stents in all) since 2004 and a pacemaker in 2007. She follows with cardiologist Dr Santoyo and Dr Zhu.  Since her last visit she has been well , without major illness, no hospitalizations . Patient states that she has not been checking her blood pressure at home.  She denies dyspnea, can be fairly active though admits to being lazy. She denies GI or  issues. Her appetite is good and her  'takes care of her'- he does most of the cooking. Patient states that she follows low sodium diet.  She had joined weight watchers and has lost a significant amount of weight and we held her amlodipine given lower BP  She now gained back some, and now stable around 160lbs. She has been baking a lot with covid  She has had a cough with some clear sputum for months, ? Post viral- Dr Gomez prescribed pred taper and it improved but did not resolve.  Her last labs were in February and we will obtain new labs in coming days.  She is quarantining as able and feels well.   She denies chest pain or shortness of breath. She has some swelling every few days for which she takes an extra furosemide.      Home BP: most 130-135, occasionally up to 150     Baseline Cr: 1.8-2.2    ROS:  A comprehensive review of systems was obtained and  negative, except as noted in the HPI or PMH.    Active Medical Problems:  Patient Active Problem List   Diagnosis     Degeneration of cervical intervertebral disc     Nonallopathic lesion of cervical region     Nonallopathic lesion of thoracic region     CAD s/p PCI+BMS to MRCA 10/2002, PCI to mLCX 2/2003, PCI+DESx2 to pLAD 11/2007, PCI+DESx1 to dRCA 12/2007, PCI+ALVAREZ to RPDA 7/2013; on indefinite DAPT w/o angina     Dizziness and giddiness     Edema     Esophageal reflux     Gout     Essential hypertension     Obstructive sleep apnea     Osteomalacia     Post-menopausal osteoporosis     Cardiac Pacemaker- Medtronic, dual chamber- NOT dependant     Transient complete heart block (H)     Sinus node dysfunction (H)     Disturbance of skin sensation     NSTEMI (non-ST elevated myocardial infarction) (H)     Arrhythmia     Sick sinus syndrome (H)     Non-rheumatic mitral regurgitation     Non-rheumatic tricuspid valve insufficiency     CKD (chronic kidney disease) stage 4, GFR 15-29 ml/min (H)       Personal Hx:   Social History     Socioeconomic History     Marital status:      Spouse name: Not on file     Number of children: 4     Years of education: Not on file     Highest education level: Not on file   Occupational History     Employer: [employer]   Tobacco Use     Smoking status: Former Smoker     Packs/day: 0.30     Years: 15.00     Pack years: 4.50     Types: Cigarettes     Smokeless tobacco: Never Used     Tobacco comment: Quit 22+ years ago   Substance and Sexual Activity     Alcohol use: Not on file     Drug use: No     Sexual activity: Not Currently     Partners: Male     Birth control/protection: Post-menopausal   Other Topics Concern      Service Not Asked     Blood Transfusions Yes     Caffeine Concern Not Asked     Occupational Exposure Not Asked     Hobby Hazards Not Asked     Sleep Concern Not Asked     Stress Concern Not Asked     Weight Concern Not Asked     Special Diet Not  "Asked     Back Care Not Asked     Exercise No     Bike Helmet Not Asked     Seat Belt Not Asked     Self-Exams Not Asked     Parent/sibling w/ CABG, MI or angioplasty before 65F 55M? Not Asked   Social History Narrative     Not on file     Social Determinants of Health     Financial Resource Strain:      Difficulty of Paying Living Expenses:    Food Insecurity:      Worried About Running Out of Food in the Last Year:      Ran Out of Food in the Last Year:    Transportation Needs:      Lack of Transportation (Medical):      Lack of Transportation (Non-Medical):    Physical Activity:      Days of Exercise per Week:      Minutes of Exercise per Session:    Stress:      Feeling of Stress :    Social Connections:      Frequency of Communication with Friends and Family:      Frequency of Social Gatherings with Friends and Family:      Attends [employer] Services:      Active Member of Clubs or Organizations:      Attends Club or Organization Meetings:      Marital Status:    Intimate Partner Violence:      Fear of Current or Ex-Partner:      Emotionally Abused:      Physically Abused:      Sexually Abused:        Allergies:  Allergies   Allergen Reactions     Bactrim [Sulfamethoxazole W/Trimethoprim] Other (See Comments)     Patient unable to recall     Biaxin [Clarithromycin]      Chlorthalidone Nausea and Vomiting     Clonidine      Codeine Sulfate Itching     Darvon [Propoxyphene Hcl]      Dilaudid [Hydromorphone] Visual Disturbance     Hallucinations       Gabapentin Other (See Comments) and Fatigue     \"felt drunk\"     Indocin      Levaquin [Levofloxacin Hemihydrate]      Lyrica Fatigue     Morphine Sulfate      Percocet [Oxycodone-Acetaminophen] Other (See Comments)     hallucinations     Pregabalin Itching     Other reaction(s): Fatigue     Shrimp Swelling     Spironolactone Other (See Comments)     Dehydrated       Terazosin      Ciprofloxacin Rash     Simvastatin Palpitations     Muscle weakness, leg cramping   "       Current Outpatient Medications   Medication     allopurinol (ZYLOPRIM) 100 MG tablet     aspirin 81 MG tablet     clopidogrel (PLAVIX) 75 MG tablet     cyanocolbalamin (VITAMIN B-12) 1000 MCG tablet     furosemide (LASIX) 40 MG tablet     isosorbide mononitrate (ISMO/MONOKET) 20 MG tablet     methylPREDNISolone (MEDROL DOSEPAK) 4 MG tablet therapy pack     metoprolol tartrate (LOPRESSOR) 100 MG tablet     Multiple Vitamins-Minerals (PRESERVISION AREDS 2+MULTI VIT PO)     NITROSTAT 0.4 MG sublingual tablet     omeprazole (PRILOSEC) 40 MG DR capsule     potassium chloride ER (K-TAB/KLOR-CON) 10 MEQ CR tablet     pravastatin (PRAVACHOL) 80 MG tablet     timolol maleate (ISTALOL) 0.5 % opthalmic solution     VITAMIN D3 25 MCG (1000 UT) tablet     No current facility-administered medications for this visit.       Vitals:  There were no vitals taken for this visit.    Exam:  General: awake and alert, appears to be in no acute distress  Neuro: normal speech  Skin:  Lungs:   Extremities:      Results:  Last Comprehensive Metabolic Panel:  Sodium   Date Value Ref Range Status   08/26/2020 141 133 - 144 mmol/L Final     Potassium   Date Value Ref Range Status   08/26/2020 4.4 3.4 - 5.3 mmol/L Final     Chloride   Date Value Ref Range Status   08/26/2020 111 (H) 94 - 109 mmol/L Final     Carbon Dioxide   Date Value Ref Range Status   08/26/2020 24 20 - 32 mmol/L Final     Anion Gap   Date Value Ref Range Status   08/26/2020 8 3 - 14 mmol/L Final     Glucose   Date Value Ref Range Status   08/26/2020 84 70 - 99 mg/dL Final     Urea Nitrogen   Date Value Ref Range Status   08/26/2020 41 (H) 7 - 30 mg/dL Final     Creatinine   Date Value Ref Range Status   08/26/2020 1.70 (H) 0.52 - 1.04 mg/dL Final     GFR Estimate   Date Value Ref Range Status   08/26/2020 27 (L) >60 mL/min/[1.73_m2] Final     Comment:     Non  GFR Calc  Starting 12/18/2018, serum creatinine based estimated GFR (eGFR) will be   calculated  using the Chronic Kidney Disease Epidemiology Collaboration   (CKD-EPI) equation.       Calcium   Date Value Ref Range Status   08/26/2020 9.0 8.5 - 10.1 mg/dL Final       Last Basic Metabolic Panel:  Lab Results   Component Value Date     11/08/2017      Lab Results   Component Value Date    POTASSIUM 3.5 11/08/2017     Lab Results   Component Value Date    CHLORIDE 104 11/08/2017     Lab Results   Component Value Date    ALEXANDRO 8.8 11/08/2017     Lab Results   Component Value Date    CO2 26 11/08/2017     Lab Results   Component Value Date    BUN 54 11/08/2017     Lab Results   Component Value Date    CR 2.36 11/08/2017     Lab Results   Component Value Date    GLC 88 11/08/2017         Dean Llanes PA-C    Face to face time 13 minutes. An additional 15 minutes was spent on date of service in chart review, documentation, and other activities as noted.           Again, thank you for allowing me to participate in the care of your patient.      Sincerely,    DEAN MAYER PA-C

## 2021-09-13 NOTE — PROGRESS NOTES
Nephrology Progress Note  9/13/21      Assessment and Plan:   85 year old female with history of long standing HTN, CAD s/p multiple stents, who presents for followup of CKD stage 3, baseline SCr 1.8-2.2 mg/dL without proteinuria.    She has not been seen in nephrology for over a year and has no recent labs    1. CKD stage 3- baseline SCr 1.8-2.2mg/ dL. Mild/ minimal proteinuria     - she had gained weight but now maintaing 160 lbs, stable  Her swelling is managed, she has some occasionally and takes an extra furosemide  - labs needs to be checked, will follow, has been stable, last creatinine 1.7 approximately one year ago     2. Electrolytes/Acid Base status- normal.    Hypokalemia- takes 3 potassium pills daily   - if she has more cramps, should let us know and eat high K diet.    3. Hypertension/Volume status-  She is up a little and swelling is also worse. She is on metoprolol 100 mg BID, furosemide 40 mg once a day with an extra pill prn,. She started isosorbide 60 mg BID in May 2021.   Last 2D ECHO in 07/2013 showed normal LVF 65 %, no significant valvular abnormalities, normal RV function.  -patient reports history of reaction/iontolerance to Spironolactone, Chlorthalidone, Clonidine and Terazosin in the past   -patient was advised to monitor her BP daily and call nephrology clinic if her BP is persistently above 140/90 mmHg  - her BP is labile, ranging from 130's to 170's systolic, was 179/83 in clinic today    4. Anemia- previously improved. Last Hgb was in normal range. Iron studies were not recently checked    5. BMD  - last serum calcium and phosphorus were normal  -secondary hyperparathyroidism-PTH was 184. Patient had PTH of 144 in 11/17. We will recheck    #Disposition: will get labs today. She will monitor BP at home in the morning and in the evening. Nurse to call in two weeks for report. No medication changes today.    Assessment and plan was discussed with patient and she voiced her understanding  and agreement.        Reason for Visit:  Tessa Mansfield is an 86 year old female with HTN, who presents for CKD management.     HPI:  She is a pleasant female with PMMHx significant for stage III CKD presumed secondary to hypertensive nephrosclerosis.Her renal function has been  relatively stable, ranging from 1.8-2.2 mg/dL over the years. She has history of CAD s/p multiple stents by Dr Mir (7 stents in all) since 2004 and a pacemaker in 2007. She follows with cardiologist Dr Santoyo and Dr Zhu.  Since her last visit she has been well , without major illness, no hospitalizations . Patient states that she has not been checking her blood pressure at home.  She denies dyspnea, can be fairly active though admits to being lazy. She denies GI or  issues. Her appetite is good and her  'takes care of her'- he does most of the cooking. Patient states that she follows low sodium diet.  She had joined weight watchers and has lost a significant amount of weight and we held her amlodipine given lower BP  She now gained back some, and now stable around 160lbs. She has been baking a lot with covid  She has had a cough with some clear sputum for months, ? Post viral- Dr Gomez prescribed pred taper and it improved but did not resolve.  Her last labs were in February and we will obtain new labs in coming days.  She is quarantining as able and feels well.   She denies chest pain or shortness of breath. She has some swelling every few days for which she takes an extra furosemide.      Home BP: most 130-135, occasionally up to 150     Baseline Cr: 1.8-2.2    ROS:  A comprehensive review of systems was obtained and negative, except as noted in the HPI or PMH.    Active Medical Problems:  Patient Active Problem List   Diagnosis     Degeneration of cervical intervertebral disc     Nonallopathic lesion of cervical region     Nonallopathic lesion of thoracic region     CAD s/p PCI+BMS to MRCA 10/2002, PCI to mLCX 2/2003,  PCI+DESx2 to pLAD 11/2007, PCI+DESx1 to dRCA 12/2007, PCI+ALVAREZ to RPDA 7/2013; on indefinite DAPT w/o angina     Dizziness and giddiness     Edema     Esophageal reflux     Gout     Essential hypertension     Obstructive sleep apnea     Osteomalacia     Post-menopausal osteoporosis     Cardiac Pacemaker- Medtronic, dual chamber- NOT dependant     Transient complete heart block (H)     Sinus node dysfunction (H)     Disturbance of skin sensation     NSTEMI (non-ST elevated myocardial infarction) (H)     Arrhythmia     Sick sinus syndrome (H)     Non-rheumatic mitral regurgitation     Non-rheumatic tricuspid valve insufficiency     CKD (chronic kidney disease) stage 4, GFR 15-29 ml/min (H)       Personal Hx:   Social History     Socioeconomic History     Marital status:      Spouse name: Not on file     Number of children: 4     Years of education: Not on file     Highest education level: Not on file   Occupational History     Employer: [employer]   Tobacco Use     Smoking status: Former Smoker     Packs/day: 0.30     Years: 15.00     Pack years: 4.50     Types: Cigarettes     Smokeless tobacco: Never Used     Tobacco comment: Quit 22+ years ago   Substance and Sexual Activity     Alcohol use: Not on file     Drug use: No     Sexual activity: Not Currently     Partners: Male     Birth control/protection: Post-menopausal   Other Topics Concern      Service Not Asked     Blood Transfusions Yes     Caffeine Concern Not Asked     Occupational Exposure Not Asked     Hobby Hazards Not Asked     Sleep Concern Not Asked     Stress Concern Not Asked     Weight Concern Not Asked     Special Diet Not Asked     Back Care Not Asked     Exercise No     Bike Helmet Not Asked     Seat Belt Not Asked     Self-Exams Not Asked     Parent/sibling w/ CABG, MI or angioplasty before 65F 55M? Not Asked   Social History Narrative     Not on file     Social Determinants of Health     Financial Resource Strain:       "Difficulty of Paying Living Expenses:    Food Insecurity:      Worried About Running Out of Food in the Last Year:      Ran Out of Food in the Last Year:    Transportation Needs:      Lack of Transportation (Medical):      Lack of Transportation (Non-Medical):    Physical Activity:      Days of Exercise per Week:      Minutes of Exercise per Session:    Stress:      Feeling of Stress :    Social Connections:      Frequency of Communication with Friends and Family:      Frequency of Social Gatherings with Friends and Family:      Attends Sikh Services:      Active Member of Clubs or Organizations:      Attends Club or Organization Meetings:      Marital Status:    Intimate Partner Violence:      Fear of Current or Ex-Partner:      Emotionally Abused:      Physically Abused:      Sexually Abused:        Allergies:  Allergies   Allergen Reactions     Bactrim [Sulfamethoxazole W/Trimethoprim] Other (See Comments)     Patient unable to recall     Biaxin [Clarithromycin]      Chlorthalidone Nausea and Vomiting     Clonidine      Codeine Sulfate Itching     Darvon [Propoxyphene Hcl]      Dilaudid [Hydromorphone] Visual Disturbance     Hallucinations       Gabapentin Other (See Comments) and Fatigue     \"felt drunk\"     Indocin      Levaquin [Levofloxacin Hemihydrate]      Lyrica Fatigue     Morphine Sulfate      Percocet [Oxycodone-Acetaminophen] Other (See Comments)     hallucinations     Pregabalin Itching     Other reaction(s): Fatigue     Shrimp Swelling     Spironolactone Other (See Comments)     Dehydrated       Terazosin      Ciprofloxacin Rash     Simvastatin Palpitations     Muscle weakness, leg cramping         Current Outpatient Medications   Medication     allopurinol (ZYLOPRIM) 100 MG tablet     aspirin 81 MG tablet     clopidogrel (PLAVIX) 75 MG tablet     cyanocolbalamin (VITAMIN B-12) 1000 MCG tablet     furosemide (LASIX) 40 MG tablet     isosorbide mononitrate (ISMO/MONOKET) 20 MG tablet     " methylPREDNISolone (MEDROL DOSEPAK) 4 MG tablet therapy pack     metoprolol tartrate (LOPRESSOR) 100 MG tablet     Multiple Vitamins-Minerals (PRESERVISION AREDS 2+MULTI VIT PO)     NITROSTAT 0.4 MG sublingual tablet     omeprazole (PRILOSEC) 40 MG DR capsule     potassium chloride ER (K-TAB/KLOR-CON) 10 MEQ CR tablet     pravastatin (PRAVACHOL) 80 MG tablet     timolol maleate (ISTALOL) 0.5 % opthalmic solution     VITAMIN D3 25 MCG (1000 UT) tablet     No current facility-administered medications for this visit.       Vitals:  There were no vitals taken for this visit.    Exam:  General: awake and alert, appears to be in no acute distress  Neuro: normal speech  Skin:  Lungs:   Extremities:      Results:  Last Comprehensive Metabolic Panel:  Sodium   Date Value Ref Range Status   08/26/2020 141 133 - 144 mmol/L Final     Potassium   Date Value Ref Range Status   08/26/2020 4.4 3.4 - 5.3 mmol/L Final     Chloride   Date Value Ref Range Status   08/26/2020 111 (H) 94 - 109 mmol/L Final     Carbon Dioxide   Date Value Ref Range Status   08/26/2020 24 20 - 32 mmol/L Final     Anion Gap   Date Value Ref Range Status   08/26/2020 8 3 - 14 mmol/L Final     Glucose   Date Value Ref Range Status   08/26/2020 84 70 - 99 mg/dL Final     Urea Nitrogen   Date Value Ref Range Status   08/26/2020 41 (H) 7 - 30 mg/dL Final     Creatinine   Date Value Ref Range Status   08/26/2020 1.70 (H) 0.52 - 1.04 mg/dL Final     GFR Estimate   Date Value Ref Range Status   08/26/2020 27 (L) >60 mL/min/[1.73_m2] Final     Comment:     Non  GFR Calc  Starting 12/18/2018, serum creatinine based estimated GFR (eGFR) will be   calculated using the Chronic Kidney Disease Epidemiology Collaboration   (CKD-EPI) equation.       Calcium   Date Value Ref Range Status   08/26/2020 9.0 8.5 - 10.1 mg/dL Final       Last Basic Metabolic Panel:  Lab Results   Component Value Date     11/08/2017      Lab Results   Component Value Date     POTASSIUM 3.5 11/08/2017     Lab Results   Component Value Date    CHLORIDE 104 11/08/2017     Lab Results   Component Value Date    ALEXANDRO 8.8 11/08/2017     Lab Results   Component Value Date    CO2 26 11/08/2017     Lab Results   Component Value Date    BUN 54 11/08/2017     Lab Results   Component Value Date    CR 2.36 11/08/2017     Lab Results   Component Value Date    GLC 88 11/08/2017         Laura Llanes PA-C    Face to face time 13 minutes. An additional 15 minutes was spent on date of service in chart review, documentation, and other activities as noted.

## 2021-09-13 NOTE — PATIENT INSTRUCTIONS
1. Labs today, call with results/recommendations.   2. Monitor blood pressure at home twice a day. Keep a log including time of day. Nurse to call in two weeks for report.   3. Continue low sodium diet and good hydration.   4. Follow up based on labs and blood pressures.   5. Call sooner with any questions or concerns.

## 2021-09-14 LAB
DEPRECATED CALCIDIOL+CALCIFEROL SERPL-MC: 50 UG/L (ref 20–75)
ERYTHROCYTE [DISTWIDTH] IN BLOOD BY AUTOMATED COUNT: 14.6 % (ref 10–15)
HCT VFR BLD AUTO: 35.8 % (ref 35–47)
HGB BLD-MCNC: 10.9 G/DL (ref 11.7–15.7)
MCH RBC QN AUTO: 29.8 PG (ref 26.5–33)
MCHC RBC AUTO-ENTMCNC: 30.4 G/DL (ref 31.5–36.5)
MCV RBC AUTO: 98 FL (ref 78–100)
PLATELET # BLD AUTO: 88 10E3/UL (ref 150–450)
RBC # BLD AUTO: 3.66 10E6/UL (ref 3.8–5.2)
WBC # BLD AUTO: 5.9 10E3/UL (ref 4–11)

## 2021-09-15 ENCOUNTER — TELEPHONE (OUTPATIENT)
Dept: NEPHROLOGY | Facility: CLINIC | Age: 86
End: 2021-09-15

## 2021-09-15 NOTE — TELEPHONE ENCOUNTER
Pt returned writer's call. Informed pt of labs and recommended to further discuss iron supplement and low platelet count with PCP. Informed pt to see JERMAINE Castro in 6 months for follow up and labs. Pt v/u and had no questions or concerns at this time.

## 2021-09-15 NOTE — TELEPHONE ENCOUNTER
June,   Please let pt know her kidney function and potassium are stable. Hgb and iron are a little low as well as her platelets. Vit D looks okay. Consider starting oral iron supplement if she tolerates it, can discuss this with her PCP. Also letting her PCP know about low platelets. She will also need to be called in a couple of weeks for blood pressure report. Otherwise, follow up with nephrology in six months with labs.     Dr. Patricia DURON on low platelets     Thank you,   Laura    Left message requesting pt to return call to writer to discuss lab results. Direct number given.

## 2021-09-21 ENCOUNTER — TELEPHONE (OUTPATIENT)
Dept: FAMILY MEDICINE | Facility: CLINIC | Age: 86
End: 2021-09-21

## 2021-09-21 NOTE — TELEPHONE ENCOUNTER
[facility] Call Center    Phone Message    May a detailed message be left on voicemail: yes     Reason for Call: Other: pt calling requesting a call back from her care team to discuss lab results. pls call back pt to advise. Thank you    Action Taken: Message routed to:  [facility]    Travel Screening: Not Applicable

## 2021-09-22 NOTE — TELEPHONE ENCOUNTER
See 09/15/21 nephrology telephone encounter.  Patient will need to discuss possible iron supplements and low platelet count with Dr. Gomez.  This will need a virtual (video or telephone) visit with Dr. Gomez to discuss.  May schedule with any pcp if Dr. Gomez is booked out.  Message sent to Dr. Gomez and RN to review result in case this calls for urgent.    Left patient VM of the above.    Message sent to provider and RN to review labs.    Bert Quan CMA ([facility]) at 1:12 PM on 9/22/2021

## 2021-09-29 ENCOUNTER — LAB (OUTPATIENT)
Dept: LAB | Facility: CLINIC | Age: 86
End: 2021-09-29
Payer: COMMERCIAL

## 2021-09-29 ENCOUNTER — E-CONSULT (OUTPATIENT)
Dept: ONCOLOGY | Facility: CLINIC | Age: 86
End: 2021-09-29

## 2021-09-29 ENCOUNTER — OFFICE VISIT (OUTPATIENT)
Dept: FAMILY MEDICINE | Facility: CLINIC | Age: 86
End: 2021-09-29
Payer: COMMERCIAL

## 2021-09-29 ENCOUNTER — ANCILLARY PROCEDURE (OUTPATIENT)
Dept: CT IMAGING | Facility: CLINIC | Age: 86
End: 2021-09-29
Attending: FAMILY MEDICINE
Payer: COMMERCIAL

## 2021-09-29 VITALS — HEART RATE: 80 BPM | SYSTOLIC BLOOD PRESSURE: 185 MMHG | OXYGEN SATURATION: 99 % | DIASTOLIC BLOOD PRESSURE: 76 MMHG

## 2021-09-29 DIAGNOSIS — R53.83 FATIGUE, UNSPECIFIED TYPE: ICD-10-CM

## 2021-09-29 DIAGNOSIS — R05.3 CHRONIC COUGH: ICD-10-CM

## 2021-09-29 DIAGNOSIS — D69.6 THROMBOCYTOPENIA (H): ICD-10-CM

## 2021-09-29 DIAGNOSIS — R06.09 DOE (DYSPNEA ON EXERTION): ICD-10-CM

## 2021-09-29 DIAGNOSIS — R05.3 CHRONIC COUGH: Primary | ICD-10-CM

## 2021-09-29 LAB — TSH SERPL DL<=0.005 MIU/L-ACNC: 3.28 MU/L (ref 0.4–4)

## 2021-09-29 PROCEDURE — 99451 NTRPROF PH1/NTRNET/EHR 5/>: CPT | Performed by: INTERNAL MEDICINE

## 2021-09-29 PROCEDURE — 71250 CT THORAX DX C-: CPT | Mod: GC | Performed by: RADIOLOGY

## 2021-09-29 PROCEDURE — 99215 OFFICE O/P EST HI 40 MIN: CPT | Performed by: FAMILY MEDICINE

## 2021-09-29 PROCEDURE — 36415 COLL VENOUS BLD VENIPUNCTURE: CPT | Performed by: PATHOLOGY

## 2021-09-29 PROCEDURE — 84443 ASSAY THYROID STIM HORMONE: CPT | Performed by: PATHOLOGY

## 2021-09-29 RX ORDER — METHYLPREDNISOLONE 4 MG
TABLET, DOSE PACK ORAL
Qty: 21 TABLET | Refills: 0 | Status: SHIPPED | OUTPATIENT
Start: 2021-09-29 | End: 2022-06-15

## 2021-09-29 RX ORDER — TIMOLOL MALEATE 5 MG/ML
SOLUTION/ DROPS OPHTHALMIC
COMMUNITY
Start: 2021-09-20

## 2021-09-29 ASSESSMENT — PAIN SCALES - GENERAL: PAINLEVEL: NO PAIN (0)

## 2021-09-29 NOTE — PROGRESS NOTES
"Cough better w/ pred   GERD controlled w/ meds  Not on ace/arb  Night, day, worse nights  Throat cough  CXR last autumn ok  Fatigue: labs rvwd, tsh, ?KEVYN      Assessment & Plan     Chronic cough  If all neg and cough perssist see pulm  - CT Chest w/o contrast; Future  - Otolaryngology Referral  - methylPREDNISolone (MEDROL DOSEPAK) 4 MG tablet therapy pack; Follow Package Directions    HIGGINS (dyspnea on exertion)  Same  - CT Chest w/o contrast; Future    Thrombocytopenia (H)    - E-CONSULT TO HEMATOLOGY (ADULT OUTPT PROVIDER TO SPECIALIST WRITTEN QUESTION & RESPONSE)    Fatigue, unspecified type  If normal conisder new KEVYN eval  - TSH with free T4 reflex; Future    44 minutes spent on the date of the encounter doing chart review, history and exam, documentation and further activities per the note    BMI:   Estimated body mass index is 25.69 kg/m  as calculated from the following:    Height as of 9/13/21: 1.702 m (5' 7\").    Weight as of 9/13/21: 74.4 kg (164 lb).     Pedro Gomez MD  [facility] [place]    Lashell Zarate is a 86 year old who presents for the following health issues     HPI Here in f/u  Medrol helped cough briefly then back, a year plus, cxr negative last fall  Mild HIGGINS, chronic  No fever  Has  GERD seems controlled on meds subjectively but cough is worse supine    Low Plt: last two cbc, new, mild  No known heme d/o  Low hgb likely low renal fcn    Htn/CKD per renal just saw    Fatigue: chronic. Labs rvwd. Polypharm. Snores.     Shots: covid booster, shingrix, flu: I suggest do all when not on pred, do at pharm        Review of Systems         Objective    BP (!) 185/76 (BP Location: Right arm, Patient Position: Sitting, Cuff Size: Adult Regular)   Pulse 80   SpO2 99%   There is no height or weight on file to calculate BMI.  Physical Exam   GENERAL: healthy, alert and no distress  NECK: no adenopathy, no asymmetry, masses, or scars and thyroid normal to " palpation  RESP: lungs clear to auscultation - no rales, rhonchi or wheezes  CV: regular rate and rhythm, normal S1 S2, no S3 or S4, no murmur, click or rub, no peripheral edema and peripheral pulses strong  ABDOMEN: soft, nontender, no hepatosplenomegaly, no masses and bowel sounds normal  MS: no gross musculoskeletal defects noted, no edema

## 2021-09-29 NOTE — PROGRESS NOTES
ALL SMARTFIELDS MUST BE COMPLETED FOR PATIENT CARE AND BILLING    9/29/2021     E-Consult has been Accept    Interprofessional consultation requested by:  Pedro Gomez MD      Clinical Question/Purpose: MY CLINICAL QUESTION IS: Last two cbc low plt, unclear cause    Patient assessment and information reviewed Pt had normal platelet count in 2018 179 k, in Oct 2019 156k,Aug 2020 113 k , and now 88k 2 wks ago Suggest either drug or immune over past one year spleen not big on CT today    Recommendations: Blood smear for morphology. INR PTT, ROBERTO RF C3 C4 CH50 CRP  Consider blood cultures with pacemaker wire infection      The recommendations provided in this E-Consult are based on the clinical data available to me at this time, and are furnished without the benefit of a comprehensive in-person or virtual patient evaluation, Any new clinical issues or changes in patient status since the filing of this E-Consult will need to be taken into account when assessing these recommendations. Please contact me if you have further questions.    My total time spent reviewing clinical information and formulating assessment was 20 minutes.    Report sent automatically to requesting provider once signed.     Charge codes - [id number] (5+ minutes) or [id number] (No charge code)    French Aponte MD

## 2021-09-29 NOTE — NURSING NOTE
Chief Complaint   Patient presents with     Recheck Medication     Discuss lab results;        Pedro Strickland, EMT at 1:57 PM on 9/29/2021.

## 2021-09-30 ENCOUNTER — TELEPHONE (OUTPATIENT)
Dept: INTERNAL MEDICINE | Facility: CLINIC | Age: 86
End: 2021-09-30

## 2021-09-30 DIAGNOSIS — D69.6 THROMBOCYTOPENIA (H): Primary | ICD-10-CM

## 2021-09-30 NOTE — TELEPHONE ENCOUNTER
Pt was informed the recommendation. She will have the labs done.    Blood smear for morphology. INR PTT, ROBERTO RF C3 C4 CH50 CRP      Soon[facility]  -------------------------------------------------------------------------------      Pedro Gomez MD  to Abel Zamudio RN;  9/30/2021  9:52 AM   Kaleb moncada   See new hematology E consult   Can you ask patient to do the labs he suggests (let me know if not clear) all re: low platelets   Except we can skip the blood cultrures for now

## 2021-10-01 ENCOUNTER — LAB (OUTPATIENT)
Dept: LAB | Facility: CLINIC | Age: 86
End: 2021-10-01
Payer: COMMERCIAL

## 2021-10-01 DIAGNOSIS — D69.6 THROMBOCYTOPENIA (H): ICD-10-CM

## 2021-10-01 LAB
APTT PPP: 37 SECONDS (ref 22–38)
BASOPHILS # BLD MANUAL: 0 10E3/UL (ref 0–0.2)
BASOPHILS NFR BLD MANUAL: 0 %
C3 SERPL-MCNC: 131 MG/DL (ref 81–157)
C4 SERPL-MCNC: 36 MG/DL (ref 13–39)
CRP SERPL-MCNC: 3.5 MG/L (ref 0–8)
EOSINOPHIL # BLD MANUAL: 0 10E3/UL (ref 0–0.7)
EOSINOPHIL NFR BLD MANUAL: 0 %
ERYTHROCYTE [DISTWIDTH] IN BLOOD BY AUTOMATED COUNT: 14.4 % (ref 10–15)
HCT VFR BLD AUTO: 35.8 % (ref 35–47)
HGB BLD-MCNC: 10.9 G/DL (ref 11.7–15.7)
INR PPP: 1.09 (ref 0.85–1.15)
LYMPHOCYTES # BLD MANUAL: 1.7 10E3/UL (ref 0.8–5.3)
LYMPHOCYTES NFR BLD MANUAL: 24 %
MCH RBC QN AUTO: 29.3 PG (ref 26.5–33)
MCHC RBC AUTO-ENTMCNC: 30.4 G/DL (ref 31.5–36.5)
MCV RBC AUTO: 96 FL (ref 78–100)
MONOCYTES # BLD MANUAL: 0.2 10E3/UL (ref 0–1.3)
MONOCYTES NFR BLD MANUAL: 3 %
NEUTROPHILS # BLD MANUAL: 5.1 10E3/UL (ref 1.6–8.3)
NEUTROPHILS NFR BLD MANUAL: 73 %
PLAT MORPH BLD: NORMAL
PLATELET # BLD AUTO: 97 10E3/UL (ref 150–450)
RBC # BLD AUTO: 3.72 10E6/UL (ref 3.8–5.2)
RBC MORPH BLD: NORMAL
RETICS # AUTO: 0.1 10E6/UL (ref 0.03–0.1)
RETICS/RBC NFR AUTO: 2.6 % (ref 0.5–2)
RHEUMATOID FACT SER NEPH-ACNC: <7 IU/ML
WBC # BLD AUTO: 7 10E3/UL (ref 4–11)

## 2021-10-01 PROCEDURE — 85027 COMPLETE CBC AUTOMATED: CPT | Performed by: PATHOLOGY

## 2021-10-01 PROCEDURE — 86038 ANTINUCLEAR ANTIBODIES: CPT | Mod: 90 | Performed by: PATHOLOGY

## 2021-10-01 PROCEDURE — 85730 THROMBOPLASTIN TIME PARTIAL: CPT | Performed by: PATHOLOGY

## 2021-10-01 PROCEDURE — 36415 COLL VENOUS BLD VENIPUNCTURE: CPT | Performed by: PATHOLOGY

## 2021-10-01 PROCEDURE — 86039 ANTINUCLEAR ANTIBODIES (ANA): CPT | Mod: 90 | Performed by: PATHOLOGY

## 2021-10-01 PROCEDURE — 85045 AUTOMATED RETICULOCYTE COUNT: CPT | Performed by: PATHOLOGY

## 2021-10-01 PROCEDURE — 86162 COMPLEMENT TOTAL (CH50): CPT | Mod: 90 | Performed by: PATHOLOGY

## 2021-10-01 PROCEDURE — 86140 C-REACTIVE PROTEIN: CPT | Performed by: PATHOLOGY

## 2021-10-01 PROCEDURE — 85610 PROTHROMBIN TIME: CPT | Performed by: PATHOLOGY

## 2021-10-01 PROCEDURE — 86431 RHEUMATOID FACTOR QUANT: CPT | Mod: 90 | Performed by: PATHOLOGY

## 2021-10-01 PROCEDURE — 86160 COMPLEMENT ANTIGEN: CPT | Mod: 90 | Performed by: PATHOLOGY

## 2021-10-03 LAB — CH50 SERPL-ACNC: >94.5 U/ML

## 2021-10-04 LAB
PATH REPORT.COMMENTS IMP SPEC: NORMAL
PATH REPORT.COMMENTS IMP SPEC: NORMAL
PATH REPORT.FINAL DX SPEC: NORMAL
PATH REPORT.MICROSCOPIC SPEC OTHER STN: NORMAL
PATH REPORT.MICROSCOPIC SPEC OTHER STN: NORMAL
PATH REPORT.RELEVANT HX SPEC: NORMAL

## 2021-10-04 NOTE — TELEPHONE ENCOUNTER
FUTURE VISIT INFORMATION      FUTURE VISIT INFORMATION:    Date: 1/6/2022    Time: 2:30PM    Location: [facility]  REFERRAL INFORMATION:    Referring provider:  Pedro Gomez MD    Referring providers clinic:  [facility] [place]     Reason for visit/diagnosis  Chronic cough, referred by Pedro Gomez MD, per Pt    RECORDS REQUESTED FROM:       Clinic name Comments Records Status Imaging Status   [facility]  9/29/2021 note and referral from Pedro Gomez MD Epic    Imaging 9/29/2021 CT Chest   9/10/2020 XR Chest   7/10/2019 US Head Neck  [facility] PACS   Procedures  4/5/2006 Upper GI Endoscopy  EPIC

## 2021-10-05 ENCOUNTER — TELEPHONE (OUTPATIENT)
Dept: INTERNAL MEDICINE | Facility: CLINIC | Age: 86
End: 2021-10-05

## 2021-10-05 DIAGNOSIS — D69.6 THROMBOCYTOPENIA (H): Primary | ICD-10-CM

## 2021-10-05 DIAGNOSIS — R76.8 ELEVATED ANTINUCLEAR ANTIBODY (ANA) LEVEL: ICD-10-CM

## 2021-10-05 LAB
ANA PAT SER IF-IMP: ABNORMAL
ANA SER QL IF: ABNORMAL
ANA TITR SER IF: ABNORMAL {TITER}

## 2021-10-05 PROCEDURE — 99207 E-CONSULT TO RHEUMATOLOGY (ADULT OUTPT PROVIDER TO SPECIALIST WRITTEN QUESTION & RESPONSE): CPT | Performed by: FAMILY MEDICINE

## 2021-10-05 NOTE — TELEPHONE ENCOUNTER
Pt is OK with Rheumatology E consult. We will let her know after the consult.    Soon[facility]  -------------------------------------------------------        ----- Message from Pedro Gomez MD sent at 10/5/2021 12:15 PM CDT -----  Tyrone lemus and thanks you recently did e consult for her platelets dwindling, borderline positive ROBERTO, one abnormal complement test, and on peripheral smear a comment on large neutrophil size, I'll ask for rheum E consult but thought I would see if any of those triggered any thoughts for you.    Soon mi can you ask pt if ok to get E consult Rheumatology re: two of the labs might suggest immune system cause of low platelets, if so I'll place that  Thanks  Alan

## 2021-10-11 DIAGNOSIS — E87.6 HYPOKALEMIA: ICD-10-CM

## 2021-10-11 RX ORDER — POTASSIUM CHLORIDE 750 MG/1
TABLET, EXTENDED RELEASE ORAL
Qty: 270 TABLET | Refills: 0 | Status: SHIPPED | OUTPATIENT
Start: 2021-10-11 | End: 2022-01-18

## 2021-10-12 ENCOUNTER — TELEPHONE (OUTPATIENT)
Dept: INTERNAL MEDICINE | Facility: CLINIC | Age: 86
End: 2021-10-12

## 2021-10-12 ENCOUNTER — TELEPHONE (OUTPATIENT)
Dept: FAMILY MEDICINE | Facility: CLINIC | Age: 86
End: 2021-10-12

## 2021-10-12 ENCOUNTER — LAB (OUTPATIENT)
Dept: LAB | Facility: CLINIC | Age: 86
End: 2021-10-12
Payer: COMMERCIAL

## 2021-10-12 DIAGNOSIS — D69.6 THROMBOCYTOPENIA (H): Primary | ICD-10-CM

## 2021-10-12 DIAGNOSIS — D69.6 THROMBOCYTOPENIA (H): ICD-10-CM

## 2021-10-12 PROCEDURE — 88189 FLOWCYTOMETRY/READ 16 & >: CPT | Performed by: PATHOLOGY

## 2021-10-12 PROCEDURE — 88185 FLOWCYTOMETRY/TC ADD-ON: CPT | Performed by: FAMILY MEDICINE

## 2021-10-12 NOTE — TELEPHONE ENCOUNTER
Left a detailed message to the pt regarding the lab order and lab appt phone number.    Soon[facility]  -----------------------------------------------------------------------------      ----- Message from Pedro Gomez MD sent at 10/8/2021  7:54 AM CDT -----  Regarding: FW: E-consult blood smear LGLs  Can you let her know we want another lab re: double check one type of blood cells called T cells    Need a blood test called flow cytometry re: low platelets    First can you see if lab has enough blood left from last draw to do that?  ----- Message -----  From: French Aponte MD  Sent: 10/8/2021   5:29 AM CDT  To: Waylon Fagan MD, Pedro Gomez MD  Subject: RE: E-consult blood smear LGLs                   Waylon  Would suggest flow cytometry to look at T cells  onlberto  ----- Message -----  From: Waylon Fagan MD  Sent: 10/7/2021   8:38 PM CDT  To: Pedro Gomez MD, #  Subject: E-consult blood smear LGLs                       Nick Hansen;     You did an e-consult on this lady, suggested ROBERTO which Alan did and was low-titer positive. In my chart review though, I see her smear had >60% LGLs.     I don't see much initial evidence she has an autoimmune condition. We could see her or extend the serologic w/unit(s) but I doubt that is going to be fruitful..Does she need Flow and/or a marrow?

## 2021-10-12 NOTE — TELEPHONE ENCOUNTER
[facility] Call Center    Phone Message    May a detailed message be left on voicemail: yes     Reason for Call: Other: Patient is calling back Dr. Gomez's nurse. Patient said she is available to talk today and has lab work at 2:00 PM.       Action Taken: Message routed to:  [facility]    Travel Screening: Not Applicable

## 2021-10-14 LAB
PATH REPORT.COMMENTS IMP SPEC: NORMAL
PATH REPORT.FINAL DX SPEC: NORMAL
PATH REPORT.MICROSCOPIC SPEC OTHER STN: NORMAL
PATH REPORT.RELEVANT HX SPEC: NORMAL

## 2021-10-15 ENCOUNTER — E-CONSULT (OUTPATIENT)
Dept: ONCOLOGY | Facility: CLINIC | Age: 86
End: 2021-10-15
Payer: COMMERCIAL

## 2021-10-15 ENCOUNTER — TELEPHONE (OUTPATIENT)
Dept: INTERNAL MEDICINE | Facility: CLINIC | Age: 86
End: 2021-10-15

## 2021-10-15 DIAGNOSIS — D69.6 THROMBOCYTOPENIA, UNSPECIFIED (H): ICD-10-CM

## 2021-10-15 DIAGNOSIS — D75.9 CYTOPENIA: Primary | ICD-10-CM

## 2021-10-15 PROCEDURE — 99451 NTRPROF PH1/NTRNET/EHR 5/>: CPT | Performed by: INTERNAL MEDICINE

## 2021-10-15 NOTE — PROGRESS NOTES
ALL SMARTFIELDS MUST BE COMPLETED FOR PATIENT CARE AND BILLING    10/15/2021     E-Consult has been Accepted    Interprofessional consultation requested by:  Pedro Gomez MD      Clinical Question/Purpose: MY CLINICAL QUESTION IS: platelets mild low did hematology e consult who suggested rheum labs; only rheum history is gout, she has borderline ROBERTO and high complements    Patient assessment and information reviewed: I didn't see blood smear initially  Waylon. Smear shows LGL which could be part of an underlying autoimmune process Flow shows increase CD 57 consistent with LGL Agree that ROBERTO is not robust and she does NOT have lupus or scleroderma but may have autoimmune clearance of her platelets could be viral process as well. I    Recommendations: T cell subset extended prolfile  EBVPCR and CMV PCR   May need heme consult      The recommendations provided in this E-Consult are based on the clinical data available to me at this time, and are furnished without the benefit of a comprehensive in-person or virtual patient evaluation, Any new clinical issues or changes in patient status since the filing of this E-Consult will need to be taken into account when assessing these recommendations. Please contact me if you have further questions.    My total time spent reviewing clinical information and formulating assessment was 15 minutes.    Report sent automatically to requesting provider once signed.     Charge codes - [id number] (5+ minutes) or [id number] (No charge code)    French Aponte MD

## 2021-10-15 NOTE — TELEPHONE ENCOUNTER
Pt was informed the recommendation.    [facility]  ------------------------------------------------------------------------------      ----- Message from Pedro Gomez MD sent at 10/15/2021  1:26 PM CDT -----  Heme had more ideas  If comes in for more labs (I think I sent you message a day or two ago) include  T cell subset extended profile  EBV PCR  CMV PCR     All re; low platelets        Anticardiolipin   Phospholipid Beta 1 glycoprotein   Lupus anticoagulant

## 2021-10-19 ENCOUNTER — LAB (OUTPATIENT)
Dept: LAB | Facility: CLINIC | Age: 86
End: 2021-10-19
Attending: FAMILY MEDICINE
Payer: COMMERCIAL

## 2021-10-19 DIAGNOSIS — D69.6 THROMBOCYTOPENIA, UNSPECIFIED (H): ICD-10-CM

## 2021-10-19 DIAGNOSIS — D75.9 CYTOPENIA: ICD-10-CM

## 2021-10-19 LAB
CD19 CELLS # BLD: 91 CELLS/UL (ref 107–698)
CD19 CELLS NFR BLD: 4 % (ref 6–27)
CD3 CELLS # BLD: 1707 CELLS/UL (ref 603–2990)
CD3 CELLS NFR BLD: 82 % (ref 49–84)
CD3+CD4+ CELLS # BLD: 666 CELLS/UL (ref 441–2156)
CD3+CD4+ CELLS NFR BLD: 32 % (ref 28–63)
CD3+CD4+ CELLS/CD3+CD8+ CLL BLD: 0.65 % (ref 1.4–2.6)
CD3+CD8+ CELLS # BLD: 1020 CELLS/UL (ref 125–1312)
CD3+CD8+ CELLS NFR BLD: 49 % (ref 10–40)
CD3-CD16+CD56+ CELLS # BLD: 273 CELLS/UL (ref 95–640)
CD3-CD16+CD56+ CELLS NFR BLD: 13 % (ref 4–25)
T CELL EXTENDED COMMENT: ABNORMAL

## 2021-10-19 PROCEDURE — 85613 RUSSELL VIPER VENOM DILUTED: CPT | Mod: 90 | Performed by: PATHOLOGY

## 2021-10-19 PROCEDURE — 86147 CARDIOLIPIN ANTIBODY EA IG: CPT | Mod: 90 | Performed by: PATHOLOGY

## 2021-10-19 PROCEDURE — 86355 B CELLS TOTAL COUNT: CPT | Mod: 90 | Performed by: PATHOLOGY

## 2021-10-19 PROCEDURE — 87798 DETECT AGENT NOS DNA AMP: CPT | Mod: 90 | Performed by: PATHOLOGY

## 2021-10-19 PROCEDURE — 86360 T CELL ABSOLUTE COUNT/RATIO: CPT | Mod: 90 | Performed by: PATHOLOGY

## 2021-10-19 PROCEDURE — 85730 THROMBOPLASTIN TIME PARTIAL: CPT | Mod: 90 | Performed by: PATHOLOGY

## 2021-10-19 PROCEDURE — 85390 FIBRINOLYSINS SCREEN I&R: CPT | Mod: 26 | Performed by: PATHOLOGY

## 2021-10-19 PROCEDURE — 86359 T CELLS TOTAL COUNT: CPT | Mod: 90 | Performed by: PATHOLOGY

## 2021-10-19 PROCEDURE — 86357 NK CELLS TOTAL COUNT: CPT | Mod: 90 | Performed by: PATHOLOGY

## 2021-10-19 PROCEDURE — 86146 BETA-2 GLYCOPROTEIN ANTIBODY: CPT | Mod: 90 | Performed by: PATHOLOGY

## 2021-10-19 PROCEDURE — 36415 COLL VENOUS BLD VENIPUNCTURE: CPT | Performed by: PATHOLOGY

## 2021-10-19 PROCEDURE — 99000 SPECIMEN HANDLING OFFICE-LAB: CPT | Performed by: PATHOLOGY

## 2021-10-20 LAB
CMV DNA SPEC NAA+PROBE-ACNC: NOT DETECTED IU/ML
DRVVT SCREEN RATIO: 1.04
INR PPP: 1.06 (ref 0.85–1.15)
LA PPP-IMP: NEGATIVE
LUPUS INTERPRETATION: NORMAL
PTT RATIO: 1.16
THROMBIN TIME: 16.9 SECONDS (ref 13–19)

## 2021-10-21 LAB
B2 GLYCOPROT1 IGG SERPL IA-ACNC: 1.2 U/ML
B2 GLYCOPROT1 IGM SERPL IA-ACNC: 2.6 U/ML
CARDIOLIPIN IGG SER IA-ACNC: <2 GPL-U/ML
CARDIOLIPIN IGG SER IA-ACNC: NEGATIVE
CARDIOLIPIN IGM SER IA-ACNC: 40 MPL-U/ML
CARDIOLIPIN IGM SER IA-ACNC: ABNORMAL

## 2021-10-22 LAB — EBV DNA SPEC QL NAA+PROBE: NOT DETECTED

## 2021-11-03 ENCOUNTER — OFFICE VISIT (OUTPATIENT)
Dept: URGENT CARE | Facility: URGENT CARE | Age: 86
End: 2021-11-03
Payer: COMMERCIAL

## 2021-11-03 ENCOUNTER — TELEPHONE (OUTPATIENT)
Dept: FAMILY MEDICINE | Facility: CLINIC | Age: 86
End: 2021-11-03

## 2021-11-03 VITALS
OXYGEN SATURATION: 98 % | BODY MASS INDEX: 25.47 KG/M2 | TEMPERATURE: 98.4 F | HEART RATE: 75 BPM | WEIGHT: 162.6 LBS | DIASTOLIC BLOOD PRESSURE: 79 MMHG | SYSTOLIC BLOOD PRESSURE: 176 MMHG

## 2021-11-03 DIAGNOSIS — R11.2 NAUSEA AND VOMITING, INTRACTABILITY OF VOMITING NOT SPECIFIED, UNSPECIFIED VOMITING TYPE: ICD-10-CM

## 2021-11-03 DIAGNOSIS — R42 DIZZINESS: Primary | ICD-10-CM

## 2021-11-03 DIAGNOSIS — E86.0 DEHYDRATION: ICD-10-CM

## 2021-11-03 DIAGNOSIS — R05.9 COUGH: ICD-10-CM

## 2021-11-03 PROCEDURE — U0003 INFECTIOUS AGENT DETECTION BY NUCLEIC ACID (DNA OR RNA); SEVERE ACUTE RESPIRATORY SYNDROME CORONAVIRUS 2 (SARS-COV-2) (CORONAVIRUS DISEASE [COVID-19]), AMPLIFIED PROBE TECHNIQUE, MAKING USE OF HIGH THROUGHPUT TECHNOLOGIES AS DESCRIBED BY CMS-2020-01-R: HCPCS | Performed by: PHYSICIAN ASSISTANT

## 2021-11-03 PROCEDURE — U0005 INFEC AGEN DETEC AMPLI PROBE: HCPCS | Performed by: PHYSICIAN ASSISTANT

## 2021-11-03 PROCEDURE — 99214 OFFICE O/P EST MOD 30 MIN: CPT | Performed by: PHYSICIAN ASSISTANT

## 2021-11-03 ASSESSMENT — ENCOUNTER SYMPTOMS
PARESTHESIAS: 0
FATIGUE: 0
FACIAL ASYMMETRY: 0
NUMBNESS: 0
WEAKNESS: 1
PALPITATIONS: 0
DIZZINESS: 1
FEVER: 0
VOMITING: 1
SPEECH DIFFICULTY: 0
TREMORS: 0
CARDIOVASCULAR NEGATIVE: 1
RHINORRHEA: 0
SEIZURES: 0
WHEEZING: 0
COUGH: 1
NAUSEA: 1
SINUS PAIN: 0
LIGHT-HEADEDNESS: 0
SINUS PRESSURE: 0
HEADACHES: 1
CONSTITUTIONAL NEGATIVE: 1
CHILLS: 0
SHORTNESS OF BREATH: 0
SORE THROAT: 0

## 2021-11-03 ASSESSMENT — PAIN SCALES - GENERAL: PAINLEVEL: NO PAIN (0)

## 2021-11-03 NOTE — PROGRESS NOTES
Lashell Zarate is a 86 year old who presents for the following health issues  accompanied by her spouse.  HPI   Dizziness  Onset/Duration: 4days  Description:   Do you feel faint: YES  Does it feel like the surroundings (bed, room) are moving: no  Unsteady/off balance: YES  Have you passed out or fallen: no  Intensity: moderate  Progression of Symptoms: same  Accompanying Signs & Symptoms:  Heart palpitations or chest pain: YES, palpitations along with HA but no chest pain  Nausea, vomiting: YES but no abdominal pain or diarrhea.  Has not been able to hold anything down.  Weakness or lack of coordination in arms or legs: YES  Vision or speech changes: no  Numbness or tingling: no  Ringing in ears (Tinnitus): no.  Reports productive cough for the past 2weeks but no shortness of breath or wheezing.  No fevers.  Hearing Loss: no  History:   Head trauma/concussion history: no  Previous similar symptoms: no  Recent bleeding history: no  Any new medications (BP?): no  Precipitating factors:   Worse with activity: YES  Worse with head movement: no  Alleviating factors:   Does staying in a fixed position give relief: YES  Therapies tried and outcome: rest, fluids with minimal relief    Patient Active Problem List   Diagnosis     Degeneration of cervical intervertebral disc     Nonallopathic lesion of cervical region     Nonallopathic lesion of thoracic region     CAD s/p PCI+BMS to MRCA 10/2002, PCI to mLCX 2/2003, PCI+DESx2 to pLAD 11/2007, PCI+DESx1 to dRCA 12/2007, PCI+ALVAREZ to RPDA 7/2013; on indefinite DAPT w/o angina     Dizziness and giddiness     Edema     Esophageal reflux     Gout     Essential hypertension     Obstructive sleep apnea     Osteomalacia     Post-menopausal osteoporosis     Cardiac Pacemaker- Medtronic, dual chamber- NOT dependant     Transient complete heart block (H)     Sinus node dysfunction (H)     Disturbance of skin sensation     NSTEMI (non-ST elevated myocardial infarction) (H)      "Arrhythmia     Sick sinus syndrome (H)     Non-rheumatic mitral regurgitation     Non-rheumatic tricuspid valve insufficiency     CKD (chronic kidney disease) stage 4, GFR 15-29 ml/min (H)     Current Outpatient Medications   Medication     allopurinol (ZYLOPRIM) 100 MG tablet     aspirin 81 MG tablet     clopidogrel (PLAVIX) 75 MG tablet     cyanocolbalamin (VITAMIN B-12) 1000 MCG tablet     furosemide (LASIX) 40 MG tablet     isosorbide mononitrate (ISMO/MONOKET) 20 MG tablet     methylPREDNISolone (MEDROL DOSEPAK) 4 MG tablet therapy pack     metoprolol tartrate (LOPRESSOR) 100 MG tablet     Multiple Vitamins-Minerals (PRESERVISION AREDS 2+MULTI VIT PO)     omeprazole (PRILOSEC) 40 MG DR capsule     potassium chloride ER (K-TAB/KLOR-CON) 10 MEQ CR tablet     pravastatin (PRAVACHOL) 80 MG tablet     timolol maleate (ISTALOL) 0.5 % opthalmic solution     timolol maleate (TIMOPTIC) 0.5 % ophthalmic solution     VITAMIN D3 25 MCG (1000 UT) tablet     methylPREDNISolone (MEDROL DOSEPAK) 4 MG tablet therapy pack     No current facility-administered medications for this visit.        Allergies   Allergen Reactions     Bactrim [Sulfamethoxazole W/Trimethoprim] Other (See Comments)     Patient unable to recall     Biaxin [Clarithromycin]      Chlorthalidone Nausea and Vomiting     Clonidine      Codeine Sulfate Itching     Darvon [Propoxyphene Hcl]      Dilaudid [Hydromorphone] Visual Disturbance     Hallucinations       Gabapentin Other (See Comments) and Fatigue     \"felt drunk\"     Indocin      Levaquin [Levofloxacin Hemihydrate]      Lyrica Fatigue     Morphine Sulfate      Percocet [Oxycodone-Acetaminophen] Other (See Comments)     hallucinations     Pregabalin Itching     Other reaction(s): Fatigue     Shrimp Swelling     Spironolactone Other (See Comments)     Dehydrated       Terazosin      Ciprofloxacin Rash     Simvastatin Palpitations     Muscle weakness, leg cramping         Review of Systems   Constitutional: " Negative.  Negative for chills, fatigue and fever.   HENT: Negative for congestion, ear discharge, ear pain, hearing loss, rhinorrhea, sinus pressure, sinus pain and sore throat.    Eyes: Negative for visual disturbance.   Respiratory: Positive for cough. Negative for shortness of breath and wheezing.    Cardiovascular: Negative.  Negative for chest pain, palpitations and peripheral edema.   Gastrointestinal: Positive for nausea and vomiting.   Allergic/Immunologic: Negative for environmental allergies.   Neurological: Positive for dizziness, weakness and headaches. Negative for tremors, seizures, syncope, facial asymmetry, speech difficulty, light-headedness, numbness and paresthesias.   All other systems reviewed and are negative.           Objective    BP (!) 176/79 (BP Location: Left arm, Patient Position: Sitting, Cuff Size: Adult Regular)   Pulse 75   Temp 98.4  F (36.9  C) (Oral)   Wt 73.8 kg (162 lb 9.6 oz)   SpO2 98%   BMI 25.47 kg/m    Body mass index is 25.47 kg/m .  Physical Exam  Vitals and nursing note reviewed.   Constitutional:       General: She is not in acute distress.     Appearance: Normal appearance. She is well-developed and normal weight. She is not ill-appearing.   HENT:      Head: Normocephalic and atraumatic.      Ears:      Comments: TMs are intact without any erythema or bulging bilaterally.  Airway is patent.     Nose: Nose normal.      Mouth/Throat:      Lips: Pink.      Mouth: Mucous membranes are moist.      Pharynx: Oropharynx is clear. Uvula midline. No pharyngeal swelling, oropharyngeal exudate or posterior oropharyngeal erythema.      Tonsils: No tonsillar exudate.   Eyes:      General: No scleral icterus.     Extraocular Movements: Extraocular movements intact.      Conjunctiva/sclera: Conjunctivae normal.      Pupils: Pupils are equal, round, and reactive to light.   Neck:      Thyroid: No thyromegaly.      Meningeal: Brudzinski's sign and Kernig's sign absent.    Cardiovascular:      Rate and Rhythm: Normal rate and regular rhythm.      Pulses: Normal pulses.      Heart sounds: Normal heart sounds, S1 normal and S2 normal. No murmur heard.   No friction rub. No gallop.    Pulmonary:      Effort: Pulmonary effort is normal. No accessory muscle usage, respiratory distress or retractions.      Breath sounds: Normal breath sounds and air entry. No decreased breath sounds, wheezing, rhonchi or rales.   Musculoskeletal:      Cervical back: Normal range of motion and neck supple.   Lymphadenopathy:      Cervical: No cervical adenopathy.   Skin:     General: Skin is warm and dry.      Capillary Refill: Capillary refill takes less than 2 seconds.      Findings: No rash.      Comments: Distal pulses are 2+ and symmetric.  No peripheral edema.   Neurological:      General: No focal deficit present.      Mental Status: She is alert and oriented to person, place, and time.      GCS: GCS eye subscore is 4. GCS verbal subscore is 5. GCS motor subscore is 6.      Cranial Nerves: Cranial nerves are intact. No cranial nerve deficit, dysarthria or facial asymmetry.      Sensory: Sensation is intact. No sensory deficit.      Motor: Motor function is intact.      Coordination: Coordination is intact. Coordination normal.      Gait: Gait is intact. Gait normal.      Deep Tendon Reflexes: Reflexes are normal and symmetric.   Psychiatric:         Mood and Affect: Mood normal.         Behavior: Behavior normal.         Thought Content: Thought content normal.         Judgment: Judgment normal.          Assessment/Plan:  Dizziness:  Along with elevated BP, n/v, and cough.  H&P is concerning for CVA/TIA vs vertigo vs dehydration vs covid.  Recommend further evaluation and management in the ER.  Will most likely need further workup with labs, IV fluids and/or imaging.  Patient has declined transportation via ambulance and will have family drive her/him.  Understands risks and benefits of ambulance  transfer and s/he has declined.  Call 911 if worsening symptoms.  S/he plans to go [facility] ER.  S/he left in stable condition with AVS in hand.  F/u with PCP after ER visit.     Dehydration    Nausea and vomiting, intractability of vomiting not specified, unspecified vomiting type    Cough:  Will have them address in the ER and check covid.  -     Symptomatic COVID-19 Virus (Coronavirus) by PCR Nose        Esthela Dany Castillo PA-C

## 2021-11-04 LAB — SARS-COV-2 RNA RESP QL NAA+PROBE: NEGATIVE

## 2021-11-10 ENCOUNTER — ANCILLARY PROCEDURE (OUTPATIENT)
Dept: CARDIOLOGY | Facility: CLINIC | Age: 86
End: 2021-11-10
Attending: INTERNAL MEDICINE
Payer: COMMERCIAL

## 2021-11-10 DIAGNOSIS — I49.5 SICK SINUS SYNDROME (H): ICD-10-CM

## 2021-11-10 PROCEDURE — 93296 REM INTERROG EVL PM/IDS: CPT

## 2021-11-10 PROCEDURE — 93294 REM INTERROG EVL PM/LDLS PM: CPT | Performed by: INTERNAL MEDICINE

## 2021-11-17 ENCOUNTER — TELEPHONE (OUTPATIENT)
Dept: INTERNAL MEDICINE | Facility: CLINIC | Age: 86
End: 2021-11-17
Payer: COMMERCIAL

## 2021-11-17 NOTE — TELEPHONE ENCOUNTER
Spoke with pt and coordinated scheduling.    Soon-Mi  ------------------------------------------------------------------------------------------      ----- Message from Pedro Gomez MD sent at 11/15/2021  3:56 PM CST -----  We were running labs in October and then I see she had two ER visits, can you have her see me in near future review all?

## 2021-11-23 ENCOUNTER — OFFICE VISIT (OUTPATIENT)
Dept: FAMILY MEDICINE | Facility: CLINIC | Age: 86
End: 2021-11-23
Payer: COMMERCIAL

## 2021-11-23 VITALS
DIASTOLIC BLOOD PRESSURE: 92 MMHG | SYSTOLIC BLOOD PRESSURE: 170 MMHG | OXYGEN SATURATION: 98 % | WEIGHT: 165 LBS | HEIGHT: 67 IN | BODY MASS INDEX: 25.9 KG/M2 | RESPIRATION RATE: 16 BRPM | HEART RATE: 85 BPM

## 2021-11-23 DIAGNOSIS — D69.6 THROMBOCYTOPENIA (H): ICD-10-CM

## 2021-11-23 DIAGNOSIS — R42 VERTIGO: ICD-10-CM

## 2021-11-23 DIAGNOSIS — R05.3 CHRONIC COUGH: Primary | ICD-10-CM

## 2021-11-23 DIAGNOSIS — Z23 NEED FOR COVID-19 VACCINE: ICD-10-CM

## 2021-11-23 DIAGNOSIS — R91.8 PULMONARY NODULES: ICD-10-CM

## 2021-11-23 PROCEDURE — 0064A COVID-19,PF,MODERNA (18+ YRS BOOSTER .25ML): CPT | Performed by: FAMILY MEDICINE

## 2021-11-23 PROCEDURE — 99215 OFFICE O/P EST HI 40 MIN: CPT | Mod: 25 | Performed by: FAMILY MEDICINE

## 2021-11-23 PROCEDURE — 91306 COVID-19,PF,MODERNA (18+ YRS BOOSTER .25ML): CPT | Performed by: FAMILY MEDICINE

## 2021-11-23 RX ORDER — DOXYCYCLINE 100 MG/1
100 CAPSULE ORAL 2 TIMES DAILY
Qty: 20 CAPSULE | Refills: 0 | Status: SHIPPED | OUTPATIENT
Start: 2021-11-23 | End: 2022-06-15

## 2021-11-23 ASSESSMENT — MIFFLIN-ST. JEOR: SCORE: 1231.07

## 2021-11-23 ASSESSMENT — PAIN SCALES - GENERAL: PAINLEVEL: NO PAIN (0)

## 2021-11-23 NOTE — NURSING NOTE
Tessa Mansfield is a 86 year old female patient that presents today in clinic for the following:    Chief Complaint   Patient presents with     Hospital F/U     Patient comes for a HFU.      The patient's allergies and medications were reviewed as noted. A set of vitals were recorded as noted without incident. The patient does not have any other questions for the provider.    Christopher Gonzalez, EMT at 9:02 AM on 11/23/2021

## 2021-11-23 NOTE — PROGRESS NOTES
".  Assessment & Plan     Chronic cough  See ENT and pulm. Try doxy  - Adult Pulmonary Medicine Referral  - doxycycline hyclate (VIBRAMYCIN) 100 MG capsule; Take 1 capsule (100 mg) by mouth 2 times daily    Need for COVID-19 vaccine  Due. Discussed Tgiving  - COVID-19,PF,MODERNA (18+ YRS BOOSTER .25ML)    Vertigo  F/u prn    Pulmonary nodules  Discuss w/ pulm likely redo 2022    Thrombocytopenia (H)  Monitor review heme if worse      Review of external notes as documented elsewhere in note  45 minutes spent on the date of the encounter doing chart review, history and exam, documentation and further activities per the note    BMI:   Estimated body mass index is 25.84 kg/m  as calculated from the following:    Height as of this encounter: 1.702 m (5' 7\").    Weight as of this encounter: 74.8 kg (165 lb).     Pedro Gomez MD  [facility] [place]    Lashell Zarate is a 86 year old who presents for the following health issues     HPI Here in f/u. Feels well.  Still cough; notes medrol stops if for a few weeks then comes back. Not on ace/arb. Does have sinus congestion too. No fever ear ache sore throat. Some phlegm sinus nose yellow at times. No sinus history. No asthma history. Remote smoker. Sent to ENT but not till winter. Not SOB  Covid shots booster due. Discussed Tgibing would be some unvaxxed people she is not sure what to do, discussed pro/con of go or not. Tough call.  Low Plt: I'd reviweed w/ heme/rheum by staff message as we did labs. At this point stable, will monitor every three mo or so, if worse review w/ heme again.  ER: vertigo. New issue. Severe. Lasted four days, spontaneous start stop.   Pulm nodule: discussed. Likley not cause of cough. Redo 2022.     Past Medical History:   Diagnosis Date     CAD (coronary artery disease)      CAD s/p PCI+BMS to MRCA 10/2002, PCI to mLCX 2/2003, PCI+DESx2 to pLAD 11/2007, PCI+DESx1 to dRCA 12/2007, PCI+ALVAREZ to RPDA 7/2013; on " indefinite DAPT  10/27/2002    10/27/2002: AMI s/p PCI+BMS (3.5x18mm Bx velocity) to mRCA 2/5/2003: Back pain; PCI+stent to mLCX c/b distal embolization/slow flow 4/11/2003: Unstable angina; PCI+stent (Hepacoat velocity) to mLAD 11/27/2007: PCI+DESx2 to pLAD (IVUS w/ calcification of LMCA and ulcerated plaque pLAD, 80% dRCA; also had 80% dLCX, too small to stent) 12/11/2007: Staged PCI. PCI+DESx1 (3.5x13mm Cypher) to dRCA (indefinite DAPT recommended at this time) 6/27/2008: Angina. mLCX stent 70% ISR. LAD and RCA stents patent. Myocardium at risk from LCX felt to be small, medical management preferred to PCI. 11/10/2009: Angina. Findings unchanged from 6/27/2008, FFR LAD 0.90. Medical management recommended. 7/23/2013: Unstable angina; PCI+ALVAREZ to mPDA. Diagnostic findings: LMCA 40% distal. LAD: pLAD stents patent mLAD 30% ISR dLAD 50% diffuse, D2 diffuse disease. LCX 80% mid ISR. RCA diffuse <30% mid, RPLAs diffuse disease, RPDA 100% occlusion.       Cardiac Pacemaker- Medtronic, dual chamber- NOT dependant 11/28/2007     CKD (chronic kidney disease), stage IV (H)      Hyperlipidemia LDL goal <70      Hypertension      Stented coronary artery 10-    RCA     Stented coronary artery 2-5-2003    LCx     Stented coronary artery 4-    LAD     Stented coronary artery 11-    LAD     Stented coronary artery 12-    RCA     Transient complete heart block (H) 11/28/2007     Past Surgical History:   Procedure Laterality Date     CHOLECYSTECTOMY       EP PACEMAKER N/A 10/15/2019    Procedure: EP PACEMAKER;  Surgeon: Anthony Zhu MD;  Location:  [facility]     HC PPM INSERTION NEW/REPLACEMENT W/ ATRIAL&VENTRICULAR LEAD  11-     HYSTERECTOMY       Current Outpatient Medications   Medication     allopurinol (ZYLOPRIM) 100 MG tablet     aspirin 81 MG tablet     clopidogrel (PLAVIX) 75 MG tablet     cyanocolbalamin (VITAMIN B-12) 1000 MCG tablet     doxycycline hyclate  "(VIBRAMYCIN) 100 MG capsule     furosemide (LASIX) 40 MG tablet     isosorbide mononitrate (ISMO/MONOKET) 20 MG tablet     methylPREDNISolone (MEDROL DOSEPAK) 4 MG tablet therapy pack     methylPREDNISolone (MEDROL DOSEPAK) 4 MG tablet therapy pack     metoprolol tartrate (LOPRESSOR) 100 MG tablet     Multiple Vitamins-Minerals (PRESERVISION AREDS 2+MULTI VIT PO)     omeprazole (PRILOSEC) 40 MG DR capsule     potassium chloride ER (K-TAB/KLOR-CON) 10 MEQ CR tablet     pravastatin (PRAVACHOL) 80 MG tablet     timolol maleate (ISTALOL) 0.5 % opthalmic solution     timolol maleate (TIMOPTIC) 0.5 % ophthalmic solution     VITAMIN D3 25 MCG (1000 UT) tablet     No current facility-administered medications for this visit.     Allergies   Allergen Reactions     Bactrim [Sulfamethoxazole W/Trimethoprim] Other (See Comments)     Patient unable to recall     Biaxin [Clarithromycin]      Chlorthalidone Nausea and Vomiting     Clonidine      Codeine Sulfate Itching     Darvon [Propoxyphene Hcl]      Dilaudid [Hydromorphone] Visual Disturbance     Hallucinations       Gabapentin Other (See Comments) and Fatigue     \"felt drunk\"     Indocin      Levaquin [Levofloxacin Hemihydrate]      Lyrica Fatigue     Morphine Sulfate      Percocet [Oxycodone-Acetaminophen] Other (See Comments)     hallucinations     Pregabalin Itching     Other reaction(s): Fatigue     Shrimp Swelling     Spironolactone Other (See Comments)     Dehydrated       Terazosin      Ciprofloxacin Rash     Simvastatin Palpitations     Muscle weakness, leg cramping             Review of Systems         Objective    BP (!) 170/92 (BP Location: Right arm, Patient Position: Sitting, Cuff Size: Adult Regular)   Pulse 85   Resp 16   Ht 1.702 m (5' 7\")   Wt 74.8 kg (165 lb)   SpO2 98%   BMI 25.84 kg/m    Body mass index is 25.84 kg/m .  Physical Exam   GENERAL: healthy, alert and no distress, no sinus tender  EYES: Eyes grossly normal to inspection, PERRL and " conjunctivae and sclerae normal  HENT: ear canals and TM's normal, nose and mouth without ulcers or lesions  NECK: no adenopathy, no asymmetry, masses, or scars and thyroid normal to palpation  RESP: lungs clear to auscultation - no rales, rhonchi or wheezes  MS: no gross musculoskeletal defects noted, no edema

## 2021-12-01 LAB
MDC_IDC_LEAD_IMPLANT_DT: NORMAL
MDC_IDC_LEAD_IMPLANT_DT: NORMAL
MDC_IDC_LEAD_LOCATION: NORMAL
MDC_IDC_LEAD_LOCATION: NORMAL
MDC_IDC_LEAD_LOCATION_DETAIL_1: NORMAL
MDC_IDC_LEAD_LOCATION_DETAIL_1: NORMAL
MDC_IDC_LEAD_MFG: NORMAL
MDC_IDC_LEAD_MFG: NORMAL
MDC_IDC_LEAD_MODEL: NORMAL
MDC_IDC_LEAD_MODEL: NORMAL
MDC_IDC_LEAD_POLARITY_TYPE: NORMAL
MDC_IDC_LEAD_POLARITY_TYPE: NORMAL
MDC_IDC_LEAD_SERIAL: NORMAL
MDC_IDC_LEAD_SERIAL: NORMAL
MDC_IDC_LEAD_SPECIAL_FUNCTION: NORMAL
MDC_IDC_LEAD_SPECIAL_FUNCTION: NORMAL
MDC_IDC_MSMT_BATTERY_DTM: NORMAL
MDC_IDC_MSMT_BATTERY_REMAINING_LONGEVITY: 138 MO
MDC_IDC_MSMT_BATTERY_RRT_TRIGGER: 2.62
MDC_IDC_MSMT_BATTERY_STATUS: NORMAL
MDC_IDC_MSMT_BATTERY_VOLTAGE: 3.02 V
MDC_IDC_MSMT_LEADCHNL_RA_IMPEDANCE_VALUE: 342 OHM
MDC_IDC_MSMT_LEADCHNL_RA_IMPEDANCE_VALUE: 380 OHM
MDC_IDC_MSMT_LEADCHNL_RA_PACING_THRESHOLD_AMPLITUDE: 0.5 V
MDC_IDC_MSMT_LEADCHNL_RA_PACING_THRESHOLD_PULSEWIDTH: 0.4 MS
MDC_IDC_MSMT_LEADCHNL_RA_SENSING_INTR_AMPL: 2.62 MV
MDC_IDC_MSMT_LEADCHNL_RA_SENSING_INTR_AMPL: 2.62 MV
MDC_IDC_MSMT_LEADCHNL_RV_IMPEDANCE_VALUE: 418 OHM
MDC_IDC_MSMT_LEADCHNL_RV_IMPEDANCE_VALUE: 456 OHM
MDC_IDC_MSMT_LEADCHNL_RV_PACING_THRESHOLD_AMPLITUDE: 0.88 V
MDC_IDC_MSMT_LEADCHNL_RV_PACING_THRESHOLD_PULSEWIDTH: 0.4 MS
MDC_IDC_MSMT_LEADCHNL_RV_SENSING_INTR_AMPL: 11.62 MV
MDC_IDC_MSMT_LEADCHNL_RV_SENSING_INTR_AMPL: 11.62 MV
MDC_IDC_PG_IMPLANT_DTM: NORMAL
MDC_IDC_PG_MFG: NORMAL
MDC_IDC_PG_MODEL: NORMAL
MDC_IDC_PG_SERIAL: NORMAL
MDC_IDC_PG_TYPE: NORMAL
MDC_IDC_SESS_CLINIC_NAME: NORMAL
MDC_IDC_SESS_DTM: NORMAL
MDC_IDC_SESS_TYPE: NORMAL
MDC_IDC_SET_BRADY_AT_MODE_SWITCH_RATE: 171 {BEATS}/MIN
MDC_IDC_SET_BRADY_HYSTRATE: NORMAL
MDC_IDC_SET_BRADY_LOWRATE: 60 {BEATS}/MIN
MDC_IDC_SET_BRADY_MAX_SENSOR_RATE: 130 {BEATS}/MIN
MDC_IDC_SET_BRADY_MAX_TRACKING_RATE: 130 {BEATS}/MIN
MDC_IDC_SET_BRADY_MODE: NORMAL
MDC_IDC_SET_BRADY_PAV_DELAY_LOW: 180 MS
MDC_IDC_SET_BRADY_SAV_DELAY_LOW: 150 MS
MDC_IDC_SET_LEADCHNL_RA_PACING_AMPLITUDE: 1.5 V
MDC_IDC_SET_LEADCHNL_RA_PACING_ANODE_ELECTRODE_1: NORMAL
MDC_IDC_SET_LEADCHNL_RA_PACING_ANODE_LOCATION_1: NORMAL
MDC_IDC_SET_LEADCHNL_RA_PACING_CAPTURE_MODE: NORMAL
MDC_IDC_SET_LEADCHNL_RA_PACING_CATHODE_ELECTRODE_1: NORMAL
MDC_IDC_SET_LEADCHNL_RA_PACING_CATHODE_LOCATION_1: NORMAL
MDC_IDC_SET_LEADCHNL_RA_PACING_POLARITY: NORMAL
MDC_IDC_SET_LEADCHNL_RA_PACING_PULSEWIDTH: 0.4 MS
MDC_IDC_SET_LEADCHNL_RA_SENSING_ANODE_ELECTRODE_1: NORMAL
MDC_IDC_SET_LEADCHNL_RA_SENSING_ANODE_LOCATION_1: NORMAL
MDC_IDC_SET_LEADCHNL_RA_SENSING_CATHODE_ELECTRODE_1: NORMAL
MDC_IDC_SET_LEADCHNL_RA_SENSING_CATHODE_LOCATION_1: NORMAL
MDC_IDC_SET_LEADCHNL_RA_SENSING_POLARITY: NORMAL
MDC_IDC_SET_LEADCHNL_RA_SENSING_SENSITIVITY: 0.3 MV
MDC_IDC_SET_LEADCHNL_RV_PACING_AMPLITUDE: 2 V
MDC_IDC_SET_LEADCHNL_RV_PACING_ANODE_ELECTRODE_1: NORMAL
MDC_IDC_SET_LEADCHNL_RV_PACING_ANODE_LOCATION_1: NORMAL
MDC_IDC_SET_LEADCHNL_RV_PACING_CAPTURE_MODE: NORMAL
MDC_IDC_SET_LEADCHNL_RV_PACING_CATHODE_ELECTRODE_1: NORMAL
MDC_IDC_SET_LEADCHNL_RV_PACING_CATHODE_LOCATION_1: NORMAL
MDC_IDC_SET_LEADCHNL_RV_PACING_POLARITY: NORMAL
MDC_IDC_SET_LEADCHNL_RV_PACING_PULSEWIDTH: 0.4 MS
MDC_IDC_SET_LEADCHNL_RV_SENSING_ANODE_ELECTRODE_1: NORMAL
MDC_IDC_SET_LEADCHNL_RV_SENSING_ANODE_LOCATION_1: NORMAL
MDC_IDC_SET_LEADCHNL_RV_SENSING_CATHODE_ELECTRODE_1: NORMAL
MDC_IDC_SET_LEADCHNL_RV_SENSING_CATHODE_LOCATION_1: NORMAL
MDC_IDC_SET_LEADCHNL_RV_SENSING_POLARITY: NORMAL
MDC_IDC_SET_LEADCHNL_RV_SENSING_SENSITIVITY: 0.9 MV
MDC_IDC_SET_ZONE_DETECTION_INTERVAL: 350 MS
MDC_IDC_SET_ZONE_DETECTION_INTERVAL: 400 MS
MDC_IDC_SET_ZONE_TYPE: NORMAL
MDC_IDC_STAT_AT_BURDEN_PERCENT: 0 %
MDC_IDC_STAT_AT_DTM_END: NORMAL
MDC_IDC_STAT_AT_DTM_START: NORMAL
MDC_IDC_STAT_BRADY_AP_VP_PERCENT: 0.04 %
MDC_IDC_STAT_BRADY_AP_VS_PERCENT: 78.03 %
MDC_IDC_STAT_BRADY_AS_VP_PERCENT: 0.02 %
MDC_IDC_STAT_BRADY_AS_VS_PERCENT: 21.91 %
MDC_IDC_STAT_BRADY_DTM_END: NORMAL
MDC_IDC_STAT_BRADY_DTM_START: NORMAL
MDC_IDC_STAT_BRADY_RA_PERCENT_PACED: 78.62 %
MDC_IDC_STAT_BRADY_RV_PERCENT_PACED: 0.07 %
MDC_IDC_STAT_EPISODE_RECENT_COUNT: 0
MDC_IDC_STAT_EPISODE_RECENT_COUNT_DTM_END: NORMAL
MDC_IDC_STAT_EPISODE_RECENT_COUNT_DTM_START: NORMAL
MDC_IDC_STAT_EPISODE_TOTAL_COUNT: 0
MDC_IDC_STAT_EPISODE_TOTAL_COUNT: 1
MDC_IDC_STAT_EPISODE_TOTAL_COUNT_DTM_END: NORMAL
MDC_IDC_STAT_EPISODE_TOTAL_COUNT_DTM_START: NORMAL
MDC_IDC_STAT_EPISODE_TYPE: NORMAL

## 2021-12-13 DIAGNOSIS — M10.00 IDIOPATHIC GOUT, UNSPECIFIED CHRONICITY, UNSPECIFIED SITE: ICD-10-CM

## 2021-12-15 RX ORDER — ALLOPURINOL 100 MG/1
100 TABLET ORAL DAILY
Qty: 90 TABLET | Refills: 3 | Status: SHIPPED | OUTPATIENT
Start: 2021-12-15 | End: 2022-12-13

## 2021-12-15 NOTE — TELEPHONE ENCOUNTER
Allopurinol 100 MG Oral Tablet  Last Written Prescription Date:  12/11/2020  Last Fill Quantity: 90,   # refills: 3  Last Office Visit : 11/23/2021  Future Office visit:  None    Routing refill request to provider for review/approval because:  Second Request  Over due Uric Acid, ALT, And Abnormal Creatinine level  Refer to clinic for review     Recent Labs   Lab Test 06/25/18  0942   ALT 14         Has Uric Acid on file in past 12 months and value is less than 6        Recent Labs   Lab Test 07/08/16  1127   URIC 10.4*   If level is 6mg/dL or greater, ok to refill one time and refer to provider.       Normal serum creatinine on file in the past 12 months        Recent Labs   Lab Test 09/13/21  1528   CR 1.81*       Uma Pool RN  Central Triage Red Flags/Med Refills

## 2021-12-16 ENCOUNTER — TELEPHONE (OUTPATIENT)
Dept: OTOLARYNGOLOGY | Facility: CLINIC | Age: 86
End: 2021-12-16
Payer: COMMERCIAL

## 2021-12-20 DIAGNOSIS — K21.9 GASTROESOPHAGEAL REFLUX DISEASE WITHOUT ESOPHAGITIS: ICD-10-CM

## 2021-12-22 NOTE — TELEPHONE ENCOUNTER
Omeprazole 40 MG Oral Capsule Delayed Release  Last Written Prescription Date:  12/11/2020  Last Fill Quantity: 90,   # refills: 3  Last Office Visit : 11/23/2021  Future Office visit:  None    Routing refill request to provider for review/approval because:  High Alert  Drug-Drug Interaction  Omeprazole/Clopidogrel  Refer to clinic for review       Uma Pool RN  Central Triage Red Flags/Med Refills

## 2021-12-27 DIAGNOSIS — I10 ESSENTIAL HYPERTENSION: ICD-10-CM

## 2021-12-27 DIAGNOSIS — N25.81 SECONDARY RENAL HYPERPARATHYROIDISM (H): ICD-10-CM

## 2021-12-27 DIAGNOSIS — N18.4 CKD (CHRONIC KIDNEY DISEASE) STAGE 4, GFR 15-29 ML/MIN (H): ICD-10-CM

## 2021-12-27 RX ORDER — CHOLECALCIFEROL (VITAMIN D3) 25 MCG
TABLET ORAL
Qty: 30 TABLET | Refills: 0 | Status: SHIPPED | OUTPATIENT
Start: 2021-12-27 | End: 2022-01-24

## 2021-12-27 RX ORDER — OMEPRAZOLE 40 MG/1
40 CAPSULE, DELAYED RELEASE ORAL DAILY
Qty: 90 CAPSULE | Refills: 0 | Status: SHIPPED | OUTPATIENT
Start: 2021-12-27 | End: 2022-03-21

## 2021-12-29 RX ORDER — METOPROLOL TARTRATE 100 MG
100 TABLET ORAL 2 TIMES DAILY
Qty: 180 TABLET | Refills: 3 | Status: SHIPPED | OUTPATIENT
Start: 2021-12-29 | End: 2023-01-03

## 2021-12-29 NOTE — TELEPHONE ENCOUNTER
Metoprolol Tartrate 100 MG Oral Tablet  Last Written Prescription Date:  12/11/2020  Last Fill Quantity: 180,   # refills: 3  Last Office Visit : 11/23/2021  Future Office visit:  None  180 Tabs, 3 Refills sent to pharm 12/29/2021      Uma Pool RN  Central Triage Red Flags/Med Refills

## 2022-01-05 ENCOUNTER — TELEPHONE (OUTPATIENT)
Dept: OTOLARYNGOLOGY | Facility: CLINIC | Age: 87
End: 2022-01-05
Payer: COMMERCIAL

## 2022-01-05 NOTE — TELEPHONE ENCOUNTER
RECORDS RECEIVED FROM: internal    DATE RECEIVED: 1.24.22   NOTES STATUS DETAILS   OFFICE NOTE from referring provider internal  Pedro Gomez MD   OFFICE NOTE from other specialist na    DISCHARGE SUMMARY from hospital na    DISCHARGE REPORT from the ER na    MEDICATION LIST internal     IMAGING  (NEED IMAGES AND REPORTS)     CT SCAN internal  9.29.21   CHEST XRAY (CXR) internal  9.10.20   TESTS     PULMONARY FUNCTION TESTING (PFT) internal  Scheduled 1.24.22      Action 1.5.22 sv   Action Taken Message sent to nurse pool to place PFT and CXR order

## 2022-01-05 NOTE — TELEPHONE ENCOUNTER
Writer contacted patient to confirm appointment with Dr. Larry 1/6/22 1:00 PM. Patient confirmed.    Juan London, EMT

## 2022-01-06 ENCOUNTER — VIRTUAL VISIT (OUTPATIENT)
Dept: OTOLARYNGOLOGY | Facility: CLINIC | Age: 87
End: 2022-01-06
Attending: FAMILY MEDICINE
Payer: COMMERCIAL

## 2022-01-06 ENCOUNTER — PRE VISIT (OUTPATIENT)
Dept: OTOLARYNGOLOGY | Facility: CLINIC | Age: 87
End: 2022-01-06

## 2022-01-06 VITALS — HEIGHT: 67 IN | OXYGEN SATURATION: 99 % | BODY MASS INDEX: 25.9 KG/M2 | HEART RATE: 86 BPM | WEIGHT: 165 LBS

## 2022-01-06 DIAGNOSIS — R05.3 CHRONIC COUGH: Primary | ICD-10-CM

## 2022-01-06 DIAGNOSIS — R05.3 CHRONIC COUGH: ICD-10-CM

## 2022-01-06 DIAGNOSIS — R49.0 DYSPHONIA: Primary | ICD-10-CM

## 2022-01-06 DIAGNOSIS — R05.9 COUGH: ICD-10-CM

## 2022-01-06 DIAGNOSIS — J38.7 IRRITABLE LARYNX: ICD-10-CM

## 2022-01-06 DIAGNOSIS — R49.0 DYSPHONIA: ICD-10-CM

## 2022-01-06 PROCEDURE — 31575 DIAGNOSTIC LARYNGOSCOPY: CPT | Performed by: OTOLARYNGOLOGY

## 2022-01-06 PROCEDURE — 92524 BEHAVRAL QUALIT ANALYS VOICE: CPT | Mod: GN | Performed by: SPEECH-LANGUAGE PATHOLOGIST

## 2022-01-06 PROCEDURE — 99204 OFFICE O/P NEW MOD 45 MIN: CPT | Mod: 25 | Performed by: OTOLARYNGOLOGY

## 2022-01-06 ASSESSMENT — PAIN SCALES - GENERAL: PAINLEVEL: NO PAIN (0)

## 2022-01-06 ASSESSMENT — MIFFLIN-ST. JEOR: SCORE: 1231.07

## 2022-01-06 NOTE — NURSING NOTE
"Chief Complaint   Patient presents with     Consult     New patient       Pulse 86, height 1.702 m (5' 7\"), weight 74.8 kg (165 lb), SpO2 99 %, not currently breastfeeding.    Juan London, EMT  "

## 2022-01-06 NOTE — LETTER
1/6/2022       RE: Tessa Mansfield  [address]  [facility] [address]     Dear Colleague,    Thank you for referring your patient, Tessa Mansfield, to the [facility] VOICE [facility] at [facility]. Please see a copy of my visit note below.      Tessa Mansfield is a 86 year old female who is being cared for via a billable virtual visit.        The patient has been notified and verbally consented to the following statements:     This video visit will be conducted between you and your provider.    Patient has opted to conduct today's video visit vs an in-person appointment, and is not able to attend due to possible exposure to COVID-19.      If during the course of the call the provider feels a video visit is not appropriate, you will not be charged for this service.    Provider has received verbal consent for billable virtual visit from the patient? Yes    Preferred method for receiving information: [employer]PS - not on my chart    Call initiated at: 1:58 PM  Platform used to conduct today's virtual appointment: AM Jak video  Location of provider: Residence  Location of patient: [facility]  Demetris Dickey Jr., M.D., F.A.C.S.  Ruby Dumont M.D., M.P.H.  Henny Larry M.D.  Beverley Marley, Ph.D., CCC-SLP  Jason Waters, Ph.D., CCC-SLP  Hilary Howard M.M. (voice), M.A., CCC-SLP  Roldan Reynolds M.M. (voice), M.A., CCC-SLP  ERMA Nino (voice), M.S., CCC-SLP       Evaluation report    Clinician: Hilary Howard M.M. (voice), M.A., CCC-SLP  Evaluated in conjunction with: Dr. Henny Larry  Referring physician:  Patricia  Patient: Tessa Mansfield  Date of Visit: 1/6/2022    HISTORY  Chief complaint: Tessa Mansfield is a 86 year old presenting today for evaluation of chronic cough.  Salient history: She has a history significant for heart issues including CAD and has a pacemaker.    Onset: Severe URI in the Spring of 2021  Course:  worsening since Fall 2021    Referring Hx Details:  Underwent evaluation and treatment with Dr. Awan.  He had provided multiple courses of prednisone, which she reported improved with that treatment in addition to a course of anti-biotics. He also notes that the cough is in the throat, worse at night, and that she has a Hx of GERD that is controlled with medication,    CURRENT SYMPTOMS INCLUDE  VOICE    Denies issues    THROAT ISSUES    The patient reports a history of throat issues that include: Chronic coughing,  which began in the spring 2021 in the context of a severe upper respiratory infection that followed a bout of COVID-19 .      Symptoms have been worsening, especially since the Fall of 2021     The patient describes frequent, dry coughing.  Less aware of the frequency of her thorat clearing.    Symptoms worsen in the context of:  o triggers include:  - No known triggers - no odors, talking, cold air.  - Starts with a tickle in the throat sometimes.  - Throat clearing is less common. Did throat clear to precede her talking, awareness may be limited.     Symptoms are improved by:  - Mitigating factors: Cough drops -help if she is severe. Sips of water are also helpful.  - Lying down worsens the cough - taking a cough drop (Ricola) helps stop.     She initially saw Dr. Awan also ordered a CT = negative.     Chronic cough was suspected and the patient was subsequently referred to our clinic.    Please see notes above for additional referral Hx.     She is retired, and lives with her , who is [place].    COUGH:  o Several months -had a severe cold and then accompanied by a cough, which has always been dry.   o Had COVID in the spring, but feels the cough started with her cold after.   o Dr. Kurtz - prednisone  o Antibiotic seemed to really help  o Frequent, moderate cough  o Sometimes she will develop more severe    THROAT CLEARING:  o Intermittent; does not bother her as much as her  cough.    GLOBUS:  o No issues    SWALLOWING    No issues with swallowing any textures    Denies increased coughing with eating or after eating.     RESPIRATION    Occasionally she experiences some shortness of breath, but not sure if it is heart related or assoc with the cough.    Does not feel it is worse since she had the severe cold.    ADDITIONAL    Reflux: occasionally experiences symptoms; on omeprazole 1x/day    Sense of taste and smell are off and on since COVID - not a problem.     OTHER PERTINENT HISTORY    Otherwise unremarkable.  Please also refer to Larry's dictation.     Past Medical History:   Diagnosis Date     CAD (coronary artery disease)      CAD s/p PCI+BMS to MRCA 10/2002, PCI to mLCX 2/2003, PCI+DESx2 to pLAD 11/2007, PCI+DESx1 to dRCA 12/2007, PCI+ALVAREZ to RPDA 7/2013; on indefinite DAPT  10/27/2002    10/27/2002: AMI s/p PCI+BMS (3.5x18mm Bx velocity) to mRCA 2/5/2003: Back pain; PCI+stent to mLCX c/b distal embolization/slow flow 4/11/2003: Unstable angina; PCI+stent (Hepacoat velocity) to mLAD 11/27/2007: PCI+DESx2 to pLAD (IVUS w/ calcification of LMCA and ulcerated plaque pLAD, 80% dRCA; also had 80% dLCX, too small to stent) 12/11/2007: Staged PCI. PCI+DESx1 (3.5x13mm Cypher) to dRCA (indefinite DAPT recommended at this time) 6/27/2008: Angina. mLCX stent 70% ISR. LAD and RCA stents patent. Myocardium at risk from LCX felt to be small, medical management preferred to PCI. 11/10/2009: Angina. Findings unchanged from 6/27/2008, FFR LAD 0.90. Medical management recommended. 7/23/2013: Unstable angina; PCI+ALVAREZ to mPDA. Diagnostic findings: LMCA 40% distal. LAD: pLAD stents patent mLAD 30% ISR dLAD 50% diffuse, D2 diffuse disease. LCX 80% mid ISR. RCA diffuse <30% mid, RPLAs diffuse disease, RPDA 100% occlusion.       Cardiac Pacemaker- Medtronic, dual chamber- NOT dependant 11/28/2007     CKD (chronic kidney disease), stage IV (H)      Hyperlipidemia LDL goal <70      Hypertension      Stented  coronary artery 10-    RCA     Stented coronary artery 2-5-2003    LCx     Stented coronary artery 4-    LAD     Stented coronary artery 11-    LAD     Stented coronary artery 12-    RCA     Transient complete heart block (H) 11/28/2007     Past Surgical History:   Procedure Laterality Date     CHOLECYSTECTOMY       EP PACEMAKER N/A 10/15/2019    Procedure: EP PACEMAKER;  Surgeon: Anthony Zhu MD;  Location:  HEART CARDIAC CATH LAB     HC PPM INSERTION NEW/REPLACEMENT W/ ATRIAL&VENTRICULAR LEAD  11-     HYSTERECTOMY         OBJECTIVE  PATIENT REPORTED MEASURES  Patient Supplied Answers To VHI Questionnaire  No flowsheet data found.    Patient Supplied Answers To CSI Questionnaire  No flowsheet data found.    Patient Supplied Answers To EAT Questionnaire  No flowsheet data found.    PERCEPTUAL EVALUATION (CPT 02287)  POSTURE / TENSION/ PALPATION OF THE LARYNGEAL AREA:     WNL    BREATHING:     appears within normal limits and adequate     clavicular elevation on inspiration    LARYNGEAL PALPATION:  Per patient palpation.     firm musculature    right greater than left    palpation induced cough    VOICE:    Donal states that today is a typical voice today, with clinician observing voice quality to be characterized as:    Roughness: Mild Intermittent    Breathiness: WNL    Strain: WNL    Pitch:    F0/ Habitual Pitch:235 Hz; 695 Hz( induced a cough) - 196 Hz    Pitch glide: neurologically normal    Maximum Phonation Time: 45 seconds    Resonance:    Conversational speech:  laryngeal pharyngeal resonance    Loudness    Conversational speech:  WNL;  is [place]    Projected speech:  WNL     Singing vs. Speech:  n/a    GLOBAL ASSESSMENT OF DYSPHONIA: 5/100    CAPE-V Overall Severity:  10/100    COUGH/THROAT CLEARING:    Frequent    Dry    Locus of cough/ throat clear: sounds consistent with upper airway       LARYNGEAL FUNCTION STUDIES (CPT 26663)  Not  warranted.    LARYNGEAL EXAMINATION  Recommend to be completed when able to come into clinic.  Procedure: Flexible endoscopy with chip-tip technology without stroboscopy, right nostril; topical anesthesia with 3% Lidocaine and 0.25% phenylephrine was applied.   Performed by: Dr. Larry  The laryngeal and pharyngeal structures were evaluated for gross appearance, mobility, function, and focal lesions / abnormalities of the associated mucosa.    All findings were within normal limits with the exception of the following salient features:   This exam shows:    Laryngeal and Vocal Fold Mucosa    Essentially healthy laryngeal mucosa    minimal presence of thickened secretions on the vocal folds and throughout the laryngeal area    Status of vocal fold mucosa:   o Within normal limits, with no lesions    Neurological and Functional Integrity of the Larynx    Vocal folds are mobile and meet at midline; movement is brisk and symmetric; exam is neurologically normal     Airway is patent    Elongation of the vocal folds for pitch increase is WNL    On phonation, glottic closure is mildly weak    Spindle-shaped configuration of during phonatory tasks    Supraglottic Function and Therapy Probes    mild anterior-posterior constriction of the supraglottic larynx during connected speech    THERAPY PROBES: Improvement was elicited with use of forward resonant stimuli, coordination of respiration and phonation, use of glottic coup to promote vocal fold closure and use of yawn sigh    The addition of stroboscopy allowed evaluation of the mucosal wave: Not warranted.       Abducted view - healthy larynx      Subtle incomplete adduction/ spindle shaped glottis.     The laryngeal exam was reviewed with Ms. Mansfield, and I provided pertinent explanations, as well as written and oral information.    ASSESSMENT / PLAN  IMPRESSIONS: Tessa Mansfield is a 86 year old female, presenting today with Irritable Larynx Syndrome (ILS) (J38.7), Chronic  Cough (R05.3), Chronic Throat Clearing (R68.89) and Dysphonia (R49.0) in the context of Hx of onset with a severe URI following a bout of COVID-19 in early 2021, as evidenced by evaluation the results of the evaluation and laryngeal exam.   Remarkable findings included:    Perceptual evaluation demonstrated: mild intermittent roughness    Laryngeal exam demonstrated: mild AP constriction during phonatory tasks. Mild spindle shaped glottic also primarily observed during phonatory tasks.    Primary complaint of patient today included: chronic cough; less concerned   Therefore, we recommended a course of speech therapy to address these concerns.    STIMULABILITY: results of therapy probes during perceptual and laryngeal evaluation demonstrate improvement with use of forward resonant stimuli, coordination of respiration and phonation, use of glottic coup to promote vocal fold closure and use of yawn sigh.    ADDITIONAL RECOMMENDATIONS:     A course of speech therapy is recommended to optimize vocal technique and help reduce chronic cough, throat clear and mucosal irritation.    She demonstrates a Good prognosis for improvement given adherence to therapeutic recommendations.     Positive indicators: positive response to therapy probes diagnosis is known to respond to treatment high level of comittment    Negative indicators: none    DURATION / FREQUENCY: 3 biweekly one-hour sessions with a member of the Voice SLP team.  A total of 6-8 sessions may be necessary.    Research: This patient is willing to be contacted about participation in research, but is over 80 years old.    GOALS:  Patient goal:   To understand the problem and fix it as much as possible  To reduce her cough to acceptable levels    Short-term goal(s): Within the first 4 sessions, Ms. Mansfield:  1. will demonstrate improved awareness of throat clearing / cough: acknowledging >75% of all cough events during session time with no clinician support  2. will be  able to demonstrate provided cough suppression and substitution strategies from memory independently with 90% accuracy  3. will demonstrate semi-occluded vocal tract (SOVT) exercises with at least 80% accuracy with no clinician support    Long-term goal(s): In 6 months, Ms. Mansfield will:  1. Report a week with no more than 2 episodes of coughing, that do not last more than 2 seconds  2. Report resolution of symptoms, and use of optimal voice quality and comfort to meet personal, social, and professional needs, 90% of the time during a typical week of vocal activities    Certification period:   Certification date from: 1/06/22  Certification date to: 4/6/22      This treatment plan was developed with the patient who agreed with the recommendations.    TOTAL SERVICE TIME:   Call Initiated at: 1:58 PM  Call Ended at: 3:00           CPT Billing Codes:   EVALUATION OF VOICE AND RESONANCE (40117)  NO CHARGE FACILITY FEE (61244)      Hilary Howard M.M. (voice), MAbyAAby, CCC/SLP  Speech-Language Pathologist  [facility] Trained Vocologist  [facility]  [phone]  [email or web address]  Pronouns: she/her      *this report was created in part through the use of computerized dictation software, and though reviewed following completion, some typographic errors may persist.  If there is confusion regarding any of this notes contents, please contact me for clarification                                                                                                 Outpatient Speech Language Therapy Evaluation  PLAN OF TREATMENT FOR OUTPATIENT REHABILITATION  (COMPLETE FOR INITIAL CLAIMS ONLY)  Patient's Last Name, First Name, M.I.  YOB: 1935  DonalTessa                        Provider's Name  Hilary Howard, SLP Medical Record No.  [id number]                               Onset Date:  1/06/22   Start of Care Date: 1/06/22     Type: Speech Language Therapy Medical Diagnosis: No primary diagnosis  found.                        Therapy Diagnosis:  No primary diagnosis found.   Visits from SOC:  1   _________________________________________________________________________________  Plan of Treatment:   Speech therapy    DURATION/FREQUENCY OF TREATMENT  Six weekly, one-hour sessions, with two monthly one-hour follow-up sessions    PROGNOSIS  Good prognosis for voice improvement with speech therapy and regular practice of therapeutic activities.    BARRIERS TO LEARNING/TEACHING AND LEARNING NEEDS  None/Unremarkable    GOALS:  Patient goal:   To understand the problem and fix it as much as possible  To reduce her cough to acceptable levels    Short-term goal(s): Within the first 4 sessions, Ms. Mansfield:  4. will demonstrate improved awareness of throat clearing / cough: acknowledging >75% of all cough events during session time with no clinician support  5. will be able to demonstrate provided cough suppression and substitution strategies from memory independently with 90% accuracy  6. will demonstrate semi-occluded vocal tract (SOVT) exercises with at least 80% accuracy with no clinician support    Long-term goal(s): In 6 months, Ms. Mansfield will:  3. Report a week with no more than 2 episodes of coughing, that do not last more than 2 seconds  4. Report resolution of symptoms, and use of optimal voice quality and comfort to meet personal, social, and professional needs, 90% of the time during a typical week of vocal activities  _________________________________________________________________________________    I CERTIFY THE NEED FOR THESE SERVICES FURNISHED UNDER        THIS PLAN OF TREATMENT AND WHILE UNDER MY CARE     (Physician attestation of this document indicates review and certification of the therapy plan).     Certification date from: 1/06/22  Certification date to: 4/6/22    Referring Provider: Pedro Gomez         Again, thank you for allowing me to participate in the care of your patient.       Sincerely,    Hilary Howard, SLP

## 2022-01-06 NOTE — PATIENT INSTRUCTIONS
Hilary Howard M.M. (voice), M.A., CCC/SLP  Speech-Language Pathologist  [facility] Trained Vocologist  [facility]  [email or web address] for fastest response  she/her  [email or web address]      You have been referred for functional voice therapy    Why?    Therapy is very useful in treating all voice disorders, regardless of the type of disorder.  If you have:    ? Muscle tension dysphonia: Your voice disorder is caused by abnormal use of the muscles of the vocal mechanism, and functional voice therapy will teach your muscles how to work properly.  ? A growth on your vocal folds (such as nodules, polyps, or cysts): Your lesion may be caused by inappropriate use of the muscles, and therefore can only be cured by learning techniques for better muscle use.  Or, your lesion may cause the muscles to be used incorrectly, and relearning better technique is an important part of overall treatment.  If your lesion needs to be surgically removed, learning to use good muscle technique helps ensure an optimal surgical result.  ? A vocal fold paralysis: Learning to use your muscles to compensate can be very helpful, and may even eliminate the need for surgery.   About muscle tension dysphonia      One of the most common voice disorders we treat is muscle tension dysphonia (MTD).  Dysphonia simply means that the sound of the voice is insufficient for its intended purpose. MTD, however, may also refer to a voice that sounds normal, but causes pain, discomfort, or fatigue to the voice user.  MTD is considered a functional disorder; that is, the muscles do not function properly, causing poor sound, discomfort, or a sensation of increased effort.      There are many possible reasons why use of the vocal mechanism becomes abnormal.  Some general causes are very common:  ? prolonged illness  ? prolonged overuse  ? prolonged underuse (such as after a surgery)  ? trauma, such as an injury, chemical  exposure, or emotionally traumatic event        These can lead to an abnormal muscle response, causing a person to use more effort while talking.  Moreover, the pushing and squeezing can be very subtle, making the individual unaware of the extra effort.  The result may or may not be a stronger voice, but it usually starts a vicious cycle where more and more effort is required.  This cycle can continue for months or even years before the individual becomes aware that his or her voice is abnormal.    Usually, the only treatment available for muscle tension dysphonia is functional therapy.            Basics of functional voice therapy        There are some general things you should know about functional voice therapy.  Functional voice therapy . . .  ? is also known as voice therapy or behavioral voice therapy.  ? should only be done after a thorough evaluation by an ENT (ear, nose, and throat) physician.  ? should be done with a certified speech-language pathologist who specializes in voice disorders.  ? includes education about the use and care of the voice and how the voice works.  ? uses progressive exercises taught over a series of sessions.  ? varies in length from several sessions to many sessions over a few months, but some relief in symptoms is almost always gained within the first 4-6 sessions.  ? may, in the case of emotional stress, need to be accompanied by counseling or stress management.  Functional voice therapy at the [facility]    At the [facility], your functional therapy is done under the direction of Dr. ANGIE Marley or Hilary Howard.  After a voice evaluation, a treatment plan is discussed with you, and therapy sessions are scheduled.  The plan for therapy varies from person to person, but in general, sessions occur weekly for one hour at first.  Sessions gradually become more spaced apart as you learn more advanced techniques.  After a few sessions, you may need more time to  practice and incorporate your techniques into everyday speech.    The first few sessions generally include a thorough discussion of your vocal lifestyle - voice needs, activities, and habits.  We will teach you how to keep the voice healthy regardless of the level of voice activity.  You will also learn pertinent information about how the voice works, helping you understand the therapeutic process better.  Then, exercises are taught to keep the upper body relaxed while using the voice, and to ensure optimal breathing technique.    At this point, specific voice exercises are taught.  Certain sounds affirm that the muscles are used correctly.  You will learn exercises to achieve these sounds first.  Once these sounds are mastered, you will advance to words, sentences, and finally conversational speech.  During your therapy sessions, exercises will be tailored to your vocal strengths and weaknesses.  During therapy, you will probably find it helpful to record your therapy session on compact disc.  Recording the exercises makes it easier to practice at home.  We encourage you to bring a CD with you to therapy.    Health insurance coverage for functional voice therapy    A normal sounding voice, free from discomfort or fatigue, is considered a normal function.  If it is disordered, restoring it is considered medically necessary.  Most insurance companies recognize this, and therapy is covered under most policies.    Functional voice therapy is considered a form of speech therapy.  If your insurance policy covers rehabilitation services such as physical therapy and speech therapy, therapy for a voice disorder should be covered.  If you have any questions about coverage, or problems with coverage for your voice treatment, please talk to Dr. Marley or Ms. Howard.  They can offer you strategies for getting them resolved.    For more information on this topic, visit our web site at  [email or web address]        Irritable Larynx Syndrome    What is Irritable Larynx Syndrome?  Irritable Larynx Syndrome (ILS) is a cluster of symptoms not associated with a specific disease process. Individuals with ILS can have any combination of the following complaints:      ? Globus sensation (feeling of lump or some other sensation in the throat)  ? Throat irritation or burning sensation  ? Tightness of throat or neck  ? Chronic cough or throat clearing; sensation of need to clear throat  ? Effort or pain with swallowing  ? Paradoxical vocal fold motion (sensation of difficulty inhaling)  ? Laryngospasm (tightening of throat causing choking or difficulty inhaling)    What causes ILS?  ILS works in the same way as a mosquito bite: We might scratch the bite absentmindedly a couple of times, and suddenly we realize the bite really itches!  So we scratch it vigorously because that feels better for a few moments. In the long run though, scratching actually makes the itch worse.  In the same way, when individuals experience mild irritation in their throats for some reason, they might not realize that they've begun  scratching  the  itch,  (throat clearing) but over time the irritation worsens and becomes more noticeable.  This is how earlier, milder symptoms can be the precursors to more-severe symptoms. Chronic irritation of the mucosa in the laryngeal area can cause changes to the nerve pathways; they can become hyper-excitable, so that it only takes a small irritation to have a large sensory response (like a cough).  It's nice that the nervous system can learn, but in this case it has backfired!    Here are some possible irritants that can start the chain reaction (most individuals have more than one):  ? Upper respiratory infection with cough  ? Acid Reflux  ? Post Nasal Drainage  ? Allergens (e.g. tree, mold, pollen, pet dander)  ? Cigarette smoke or other kinds of smoke  ? Odors (e.g. perfume,  hairspray)  ? Food sensitivities  ? Strong Emotions (e.g. anxiety, stress)  ? Hyperfunction of the muscles of the vocal mechanism  ? Harsh chemicals/  ? Cold Air    Evaluation of ILS  At the [facility], an otolaryngologist and a speech-language pathologist work as a team to determine if ILS is a problem.  Medical evaluation and laryngeal examination rule out any other cause for the symptoms.  Some medical treatment may be advised.  Functional evaluation determines whether there are behavioral or lifestyle factors that are contributing to the symptoms.      Treatment of ILS  Treatment of ILS addresses the cause of irritation. This can include:   ? Treatment of Acid Reflux This may include: Medications (what your MD prescribes), Dietary Precautions (what you eat and what supplements you take), Lifestyle Precautions (when you eat, avoiding environmental irritants), and Mechanical Precautions (how much pressure you put on your lower esophageal sphincter).  ? Functional Speech Therapy with a Speech-Language Pathologist (SLP). Your SLP will educate you about the disorder, help you improve the environment of your mucosa to reduce irritation, improve the movement and function of your larynx, and help reduce muscle tension and restore muscle balance.  ? Further Medical treatment is sometimes used to restore the medical basis for normal function and sensation.  Your MD will discuss these possibilities with you if they are relevant.      Cough and Throat Clearing  The biological purpose of the larynx is to protect the airway (trachea and lungs). Coughing and throat clearing are mechanisms that are meant to assist in the protection of our airway. When we feel something such as liquid, food, saliva, dust, etc. near our vocal folds, we will clear our throats and cough as a protective reflex. We also cough or throat clear if our airway is irritated.     Why can chronic coughing and throat clearing be harmful?  A  cough is produced by squeezing the vocal folds together, building up pressure in the lungs, and then quickly forcing the vocal folds open to clear away whatever might be getting too close to our airway. This can be traumatic to the vocal folds, irritating them in the same way that our hands would be irritated if they were being slapped together over and over. Coughing for a long period of time can cause the mucosa of the larynx and vocal folds to become hypersensitive, making it feel like something is threatening the airway.  The vocal folds need to cough or throat clear even when there isn't an actual physical threat to the airway.  The chronic coughing or throat clearing results in even more coughing or throat clearing.      Strategies to Reduce Irritation  Your speech-language pathologist will teach you techniques to use muscles optimally, in order to reduce irritation.  In the meantime, you can start reducing the irritation, using the following strategies:    ? Identifying your unique trigger; take steps to avoid it  ? Improve both systemic and topical (surface) hydration (dry mucosa is more easily irritated)  ? Drink adequate fluids   ? Use a humidifier, especially in the bedroom at night  ? Guaifenesin (e.g. Mucinex) to thin viscous secretions  ? Sucking on candy, or cough drops without menthol, or chew gum, to increase salivary stimulation  ? Alternative Behaviors to coughing  ? Swallow hard  ? Stimulate your oral cavity:   ? Sip hot, cold, carbonated, or flavored water  ? Suck on ice chips  ? Bite your tongue or cheek (not too hard!)  ? Massage under your chin  ? Gargle  ? Gentle hums or vocal exercises taught to you by your SLP  ? Say  eh eh eh eh  silently (for a gentle, non-irritating throat-clear)  ? Sniff or pursed lip inhale and exhale on  Shhh  like shushing a baby  ? Sniff or pursed lip inhale and exhale on  zzz  like a buzzing bee  ? Wait. While you are waiting, try the above behaviors instead

## 2022-01-06 NOTE — PROGRESS NOTES
Outpatient Speech Language Therapy Evaluation  PLAN OF TREATMENT FOR OUTPATIENT REHABILITATION  (COMPLETE FOR INITIAL CLAIMS ONLY)  Patient's Last Name, First Name, M.I.  YOB: 1935  Tessa Mansfield                        Provider's Name  Hilary OLIVERIO Howard, SLP Medical Record No.  [id number]                               Onset Date:  1/06/22   Start of Care Date: 1/06/22     Type: Speech Language Therapy Medical Diagnosis: No primary diagnosis found.                        Therapy Diagnosis:  No primary diagnosis found.   Visits from SOC:  1   _________________________________________________________________________________  Plan of Treatment:   Speech therapy    DURATION/FREQUENCY OF TREATMENT  Six weekly, one-hour sessions, with two monthly one-hour follow-up sessions    PROGNOSIS  Good prognosis for voice improvement with speech therapy and regular practice of therapeutic activities.    BARRIERS TO LEARNING/TEACHING AND LEARNING NEEDS  None/Unremarkable    GOALS:  Patient goal:   To understand the problem and fix it as much as possible  To reduce her cough to acceptable levels    Short-term goal(s): Within the first 4 sessions, Ms. Mansfield:  1. will demonstrate improved awareness of throat clearing / cough: acknowledging >75% of all cough events during session time with no clinician support  2. will be able to demonstrate provided cough suppression and substitution strategies from memory independently with 90% accuracy  3. will demonstrate semi-occluded vocal tract (SOVT) exercises with at least 80% accuracy with no clinician support    Long-term goal(s): In 6 months, Ms. Mansfield will:  1. Report a week with no more than 2 episodes of coughing, that do not last more than 2 seconds  2. Report resolution of symptoms, and use of optimal voice quality and comfort to meet personal, social, and professional needs, 90% of the  time during a typical week of vocal activities  _________________________________________________________________________________    I CERTIFY THE NEED FOR THESE SERVICES FURNISHED UNDER        THIS PLAN OF TREATMENT AND WHILE UNDER MY CARE     (Physician attestation of this document indicates review and certification of the therapy plan).     Certification date from: 1/06/22  Certification date to: 4/6/22    Referring Provider: Pedro Gomez

## 2022-01-06 NOTE — PATIENT INSTRUCTIONS
1.  You were seen in the ENT Clinic today by . If you have any questions or concerns after your appointment, please call [phone]. Press option #1 for scheduling related needs. Press option #3 for Nurse advice.    2.   has recommended  the following:   - cough/throat clearing suppression therapy with Hilary     3.  Plan is to return to clinic as needed after completing therapy      Melissa Garza LPN  [phone]  [facility] Otol[facility]

## 2022-01-06 NOTE — PROGRESS NOTES
[facility]   at the [facility]   Otolaryngology Clinic     Patient: Tessa Mansfield    MRN: [id number]    : 1935    Age/Gender: 86 year old female  Date of Service: 2022  Rendering Provider:   Henny Larry MD     Referring Provider   PCP: Pedro Gomez  Referring Physician: Pedro Gomez MD  [address] [place] [id number]  Reason for Consultation   Chronic cough  History   HISTORY OF PRESENT ILLNESS: I was asked to consult on Tessa Mansfield, by Dr. Gomez for evaluation of chronic cough . Ms. Mansfield is a 86 year old female who presents to us today with chronic cough.      Of note, she has a pacemaker and is on plavix.     she presents today for evaluation. she reports:    Dysphonia  - no voice concerns  - good volume     Dysphagia  - denies  - does not cough or choke when she eats food  - does not clear throat more with eating    Dyspnea  - occasional shortness of breath   - unsure if related to heart history    Throat clearing/cough  - progressively worse cough since 2021  - started after severe cold from COVID-19   - no strong triggers  - odors, talking, cold air do not trigger  - sometimes begins with a tickle in the back of the throat  - throat clearing less common  - Riccola cough drops help when cough is more severe  - taking a sip of water helps  - lying down makes cough worse  - no globus sensation  - cough does not increase with or after eating  - thinks throat clearing has become a habit  - denies vomiting during coughing spells  - does not cough more with talking or physical activity     GERD/LPRD   - does have GERD  - on omeprazole   - still experiences occasional reflux symptoms   - sense of taste and smell are still off since COVID-19     PAST MEDICAL HISTORY:   Past Medical History:   Diagnosis Date     CAD (coronary artery disease)      CAD s/p PCI+BMS to MRCA 10/2002, PCI to mLCX 2003, PCI+DESx2 to pLAD 2007, PCI+DESx1 to Raffaele  12/2007, PCI+ALVAREZ to RPDA 7/2013; on indefinite DAPT  10/27/2002    10/27/2002: AMI s/p PCI+BMS (3.5x18mm Bx velocity) to mRCA 2/5/2003: Back pain; PCI+stent to mLCX c/b distal embolization/slow flow 4/11/2003: Unstable angina; PCI+stent (Hepacoat velocity) to mLAD 11/27/2007: PCI+DESx2 to pLAD (IVUS w/ calcification of LMCA and ulcerated plaque pLAD, 80% dRCA; also had 80% dLCX, too small to stent) 12/11/2007: Staged PCI. PCI+DESx1 (3.5x13mm Cypher) to dRCA (indefinite DAPT recommended at this time) 6/27/2008: Angina. mLCX stent 70% ISR. LAD and RCA stents patent. Myocardium at risk from LCX felt to be small, medical management preferred to PCI. 11/10/2009: Angina. Findings unchanged from 6/27/2008, FFR LAD 0.90. Medical management recommended. 7/23/2013: Unstable angina; PCI+ALVAREZ to mPDA. Diagnostic findings: LMCA 40% distal. LAD: pLAD stents patent mLAD 30% ISR dLAD 50% diffuse, D2 diffuse disease. LCX 80% mid ISR. RCA diffuse <30% mid, RPLAs diffuse disease, RPDA 100% occlusion.       Cardiac Pacemaker- Medtronic, dual chamber- NOT dependant 11/28/2007     CKD (chronic kidney disease), stage IV (H)      Hyperlipidemia LDL goal <70      Hypertension      Stented coronary artery 10-    RCA     Stented coronary artery 2-5-2003    LCx     Stented coronary artery 4-    LAD     Stented coronary artery 11-    LAD     Stented coronary artery 12-    RCA     Transient complete heart block (H) 11/28/2007       PAST SURGICAL HISTORY:   Past Surgical History:   Procedure Laterality Date     CHOLECYSTECTOMY       EP PACEMAKER N/A 10/15/2019    Procedure: EP PACEMAKER;  Surgeon: Anthony Zhu MD;  Location:  HEART CARDIAC CATH LAB     HC PPM INSERTION NEW/REPLACEMENT W/ ATRIAL&VENTRICULAR LEAD  11-     HYSTERECTOMY         CURRENT MEDICATIONS:   Current Outpatient Medications:      allopurinol (ZYLOPRIM) 100 MG tablet, Take 1 tablet (100 mg) by mouth daily, Disp: 90 tablet, Rfl: 3      omeprazole (PRILOSEC) 40 MG DR capsule, Take 1 capsule (40 mg) by mouth daily Take 1 capsule by mouth once daily, Disp: 90 capsule, Rfl: 0     aspirin 81 MG tablet, Take 1 tablet by mouth daily., Disp: , Rfl:      clopidogrel (PLAVIX) 75 MG tablet, Take 1 tablet (75 mg) by mouth daily, Disp: 90 tablet, Rfl: 3     cyanocolbalamin (VITAMIN B-12) 1000 MCG tablet, Take 500 mcg by mouth daily As directed, Disp: , Rfl:      doxycycline hyclate (VIBRAMYCIN) 100 MG capsule, Take 1 capsule (100 mg) by mouth 2 times daily, Disp: 20 capsule, Rfl: 0     furosemide (LASIX) 40 MG tablet, Take 1 tablet (40 mg) by mouth 2 times daily, Disp: 60 tablet, Rfl: 11     isosorbide mononitrate (ISMO/MONOKET) 20 MG tablet, Take 3 tablets (60 mg) by mouth 2 times daily, Disp: 540 tablet, Rfl: 1     methylPREDNISolone (MEDROL DOSEPAK) 4 MG tablet therapy pack, Follow Package Directions, Disp: 21 tablet, Rfl: 0     methylPREDNISolone (MEDROL DOSEPAK) 4 MG tablet therapy pack, Follow Package Directions, Disp: 21 tablet, Rfl: 0     metoprolol tartrate (LOPRESSOR) 100 MG tablet, Take 1 tablet (100 mg) by mouth 2 times daily, Disp: 180 tablet, Rfl: 3     Multiple Vitamins-Minerals (PRESERVISION AREDS 2+MULTI VIT PO), Take by mouth 2 times daily, Disp: , Rfl:      potassium chloride ER (K-TAB/KLOR-CON) 10 MEQ CR tablet, TAKE 2 TABLETS BY MOUTH IN THE MORNING AND 1 TABLET IN THE EVENING, Disp: 270 tablet, Rfl: 0     pravastatin (PRAVACHOL) 80 MG tablet, Take 1 tablet (80 mg) by mouth daily, Disp: 90 tablet, Rfl: 3     timolol maleate (ISTALOL) 0.5 % opthalmic solution, INSTILL 1 INTO EACH EYE ONCE DAILY IN THE MORNING, Disp: , Rfl:      timolol maleate (TIMOPTIC) 0.5 % ophthalmic solution, INSTILL 1 DROP INTO EACH EYE ONCE DAILY IN THE MORNING, Disp: , Rfl:      VITAMIN D3 25 MCG (1000 UT) tablet, TAKE 1 TABLET BY MOUTH ONCE DAILY **DUE  TO  BE  SEEN  FOR  FURTHER  REFILLS**, Disp: 30 tablet, Rfl: 0    ALLERGIES: Bactrim [sulfamethoxazole  w/trimethoprim], Biaxin [clarithromycin], Chlorthalidone, Clonidine, Codeine sulfate, Darvon [propoxyphene hcl], Dilaudid [hydromorphone], Gabapentin, Indocin, Levaquin [levofloxacin hemihydrate], Lyrica, Morphine sulfate, Percocet [oxycodone-acetaminophen], Pregabalin, Shrimp, Spironolactone, Terazosin, Ciprofloxacin, and Simvastatin    SOCIAL HISTORY:    Social History     Socioeconomic History     Marital status:      Spouse name: Not on file     Number of children: 4     Years of education: Not on file     Highest education level: Not on file   Occupational History     Employer: [employer]   Tobacco Use     Smoking status: Former Smoker     Packs/day: 0.30     Years: 15.00     Pack years: 4.50     Types: Cigarettes     Smokeless tobacco: Never Used     Tobacco comment: Quit 22+ years ago   Substance and Sexual Activity     Alcohol use: Not on file     Drug use: No     Sexual activity: Not Currently     Partners: Male     Birth control/protection: Post-menopausal   Other Topics Concern      Service Not Asked     Blood Transfusions Yes     Caffeine Concern Not Asked     Occupational Exposure Not Asked     Hobby Hazards Not Asked     Sleep Concern Not Asked     Stress Concern Not Asked     Weight Concern Not Asked     Special Diet Not Asked     Back Care Not Asked     Exercise No     Bike Helmet Not Asked     Seat Belt Not Asked     Self-Exams Not Asked     Parent/sibling w/ CABG, MI or angioplasty before 65F 55M? Not Asked   Social History Narrative     Not on file     Social Determinants of Health     Financial Resource Strain: Not on file   Food Insecurity: Not on file   Transportation Needs: Not on file   Physical Activity: Not on file   Stress: Not on file   Social Connections: Not on file   Intimate Partner Violence: Not on file   Housing Stability: Not on file         FAMILY HISTORY:   Family History   Problem Relation Age of Onset     Cancer Sister 82        bladder cancer      Non-contributory for problems with anesthesia    REVIEW OF SYSTEMS:   The patient was asked a 14 point review of systems regarding constitutional symptoms, eye symptoms, ears, nose, mouth, throat symptoms, cardiovascular symptoms, respiratory symptoms, gastrointestinal symptoms, genitourinary symptoms, musculoskeletal symptoms, integumentary symptoms, neurological symptoms, psychiatric symptoms, endocrine symptoms, hematologic/lymphatic symptoms, and allergic/ immunologic symptoms.   The pertinent factors have been included in the HPI and below.  Patient Supplied Answers to Review of Systems  No flowsheet data found.    Physical Examination   The patient underwent a physical examination as described below. The pertinent positive and negative findings are summarized after the description of the examination.  Constitutional: The patient's developmental and nutritional status was assessed. The patient's voice quality was assessed.  Head and Face: The head and face were inspected for deformities. The sinuses were palpated. The salivary glands were palpated. Facial muscle strength was assessed bilaterally.  Eyes: Extraocular movements and primary gaze alignment were assessed.  Ears, Nose, Mouth and Throat: The ears and nose were examined for deformities. The nasal septum, mucosa, and turbinates were inspected by anterior rhinoscopy. The lips, teeth, and gums were examined for abnormalities. The oral mucosa, tongue, palate, tonsils, lateral and posterior pharynx were inspected for the presence of asymmetry or mucosal lesions.    Neck: The tracheal position was noted, and the neck mass palpated to determine if there were any asymmetries, abnormal neck masses, thyromegally, or thyroid nodules.  Respiratory: The nature of the breathing and chest expansion/symmetry was observed.  Cardiovascular: The patient was examined to determine the presence of any edema or jugular venous distension.  Abdomen: The contour of the  abdomen was noted.  Lymphatic: The patient was examined for infraclavicular lymphadenopathy.  Musculoskeletal: The patient was inspected for the presence of skeletal deformities.  Extremities: The extremities were examined for any clubbing or cyanosis.  Skin: The skin was examined for inflammatory or neoplastic conditions.  Neurologic: The patient's orientation, mood, and affect were noted. The cranial nerve  functions were examined.  Other pertinent positive and negative findings on physical examination:      OC/OP: no lesions, uvula midline, soft palate elevates symmetrically  Neck: no lesions, no TH tenderness to palpation, bilateral parathyroid space pressure causes cough    All other physical examination findings were within normal limits and noncontributory.  Procedures   Flexible laryngoscopy (CPT 05241)      Pre-procedure diagnosis: chronic cough  Post-procedure diagnosis: same as above  Indication for procedure: Ms. Mansfield is a 86 year old female with see above  Procedure(s): Fiberoptic Laryngoscopy    Details of Procedure: After informed consent was obtained, the patient was seated in the examination chair.  The areas of the nasopharynx as well as the hypopharynx were anesthetized with topical 4% lidocaine with 0.25% phenylephrine atomizer.  Examination of the base of tongue was performed first.  Attention was directed to any evidence of masses in the area or evidence of leukoplakia or candidal infection.  Attention was directed to the epiglottis where its size and position was determined and its movement on phonation of the vowel  e .  The piriform sinuses were then inspected for any mass lesions or pooling of secretions.  Attention was then directed to the larynx. The vocal folds were inspected for infection or any areas of leukoplakia, for masses, polypoid degeneration, or hemorrhage.  Having done this, the arytenoids and vocal processes were inspected for erythema or evidence of granuloma formation.   The posterior commissure was then inspected for evidence of inflammatory changes in the mucosa and heaping up of mucosal tissue. The patient was then instructed to say the vowel  e .  Adduction of vocal folds to the midline was observed for any evidence of paresis or paralysis of the larynx or asymmetry in rotation of the larynx to the left or right. The patient was asked to breathe and the degree of abduction was noted bilaterally.  Subglottic view of the larynx was obtained for any additional mass lesions or mucosal changes.  Finally the post cricoid was examined for evidence of pooling of secretions, as well as the pharyngeal wall mucosa.   Anesthesia type: 0.25% phenylephrine    Findings:  Anatomic/physiological deviations: via R NC, mild mucus over vocal folds, presbylarynx    Right vocal process: No restriction of mobility   Left vocal process: No restriction of mobility  Glottal gap: glottal gap  Supraglottic structures: Normal  Hypopharynx: Normal     Estimated Blood Loss: minimal  Complications: None  Disposition: Patient tolerated the procedure well              Review of Relevant Clinical Data   I personally reviewed:  Notes: Dr. Gomez 11/23/21  Radiology: CT chest 09/29/2021  1. No acute consolidative opacity.  2. Nonspecific subpleural nodules in the right lower lobe. Recommend  follow-up CT in 6-12 months with special attention to the 8   millimeter right lower lobe groundglass opacity.      XR Chest 09/10/2020  No evidence of active cardiopulmonary disease.        US Head Neck 07/10/2019  Normal grayscale evaluation of the soft tissue overlying  the left mandible. No focal lesion identified.    Procedures: EGD 04/05/2006  Normal esophagus.   Normal stomach.   Normal examined duodenum.  Labs:  Lab Results   Component Value Date    TSH 3.28 09/29/2021     Lab Results   Component Value Date     09/13/2021    CO2 26 09/13/2021    BUN 38 (H) 09/13/2021    PHOS 3.3 09/13/2021     Lab Results    Component Value Date    WBC 7.0 10/01/2021    HGB 10.9 (L) 10/01/2021    HCT 35.8 10/01/2021    MCV 96 10/01/2021    PLT 97 (L) 10/01/2021     Lab Results   Component Value Date    PTT 37 10/01/2021    INR 1.06 10/19/2021     No results found for: ROBERTO  No components found for: RHEUMATOIDFACTOR,  RF  Lab Results   Component Value Date    CRP 3.5 10/01/2021    CRP 5.7 2010    CRP 44.4 (H) 2010     No components found for: CKTOT, URICACID  No components found for: C3, C4, DSDNAAB, NDNAABIFA  No results found for: MPOAB    Patient reported Quality of Life (QOL) Measures   Patient Supplied Answers To VHI Questionnaire  No flowsheet data found.      Patient Supplied Answers To EAT Questionnaire  No flowsheet data found.      Patient Supplied Answers To CSI Questionnaire  No flowsheet data found.      Patient Supplied Answers to Dyspnea Index Questionnaire:  No flowsheet data found.    Impression & Plan     IMPRESSION: Ms. Mansfield is a 86 year old female who is being seen for the followin. Chronic cough/throat clearing   - started since having COVID-19 in 2021   - has worsened overtime  - it is throat clearing more frequently  - less frequently it is coughing  - feels mucus in her throat as her trigger  - thinks throat clearing has become a habit  - no ACEI   - allergy triggers and work up: denies   - pulmonary triggers and work up: had CT chest on 2021 with patent subglottis, not worse with physical activity   - GI triggers and work up: does have GERD on omeprazole, associated with laying down, not worse with eating  - ENT triggers and work up: feels sensation of mucus, not sure if it is worse with talking  - scope today 2022 shows 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 All documentation has been reviewed by the aforementioned provider prior to being entered into the official medical record.     Thank you for the kind referral and for allowing me to share in the care of Ms. Mansfield. If you have any questions, please do not hesitate to contact me.    Henny Larry MD    Laryngology    [facility]  Department of  Otolaryngology - Head and Neck Surgery  [facility] [place] [address]  Appointment line: [phone]  Fax: [phone]  [email or web address]

## 2022-01-06 NOTE — PROGRESS NOTES
Tessa Mansfield is a 86 year old female who is being cared for via a billable virtual visit.        The patient has been notified and verbally consented to the following statements:     This video visit will be conducted between you and your provider.    Patient has opted to conduct today's video visit vs an in-person appointment, and is not able to attend due to possible exposure to COVID-19.      If during the course of the call the provider feels a video visit is not appropriate, you will not be charged for this service.    Provider has received verbal consent for billable virtual visit from the patient? Yes    Preferred method for receiving information: [employer] - not on my chart    Call initiated at: 1:58 PM  Platform used to conduct today's virtual appointment: AM Well video  Location of provider: Residence  Location of patient: [facility]  Demetris Dickey Jr., M.D., F.A.C.S.  Ruby Dumont M.D., M.P.H.  Henny Larry M.D.  Beverley Marley, Ph.D., CCC-SLP  Jason Waters, Ph.D., [facility]-SLP  Hilary Howard M.M. (voice), M.A., CCC-SLP  Roldan Reynolds M.M. (voice), M.A., CCC-SLP  ERMA Nino (voice), M.S., CCC-SLP       Evaluation report    Clinician: Hilary Howard M.M. (voice), M.A., CCC-SLP  Evaluated in conjunction with: Dr. Henny Larry  Referring physician:  Patricia  Patient: Tessa Mansfield  Date of Visit: 1/6/2022    HISTORY  Chief complaint: Tessa Mansfield is a 86 year old presenting today for evaluation of chronic cough.  Salient history: She has a history significant for heart issues including CAD and has a pacemaker.    Onset: Severe URI in the Spring of 2021  Course: worsening since Fall 2021    Referring Hx Details:  Underwent evaluation and treatment with Dr. Awan.  He had provided multiple courses of prednisone, which she reported improved with that treatment in addition to a course of anti-biotics. He also notes that the cough is in the throat, worse at night, and that  she has a Hx of GERD that is controlled with medication,    CURRENT SYMPTOMS INCLUDE  VOICE    Denies issues    THROAT ISSUES    The patient reports a history of throat issues that include: Chronic coughing,  which began in the spring 2021 in the context of a severe upper respiratory infection that followed a bout of COVID-19 .      Symptoms have been worsening, especially since the Fall of 2021     The patient describes frequent, dry coughing.  Less aware of the frequency of her thorat clearing.    Symptoms worsen in the context of:  o triggers include:  - No known triggers - no odors, talking, cold air.  - Starts with a tickle in the throat sometimes.  - Throat clearing is less common. Did throat clear to precede her talking, awareness may be limited.     Symptoms are improved by:  - Mitigating factors: Cough drops -help if she is severe. Sips of water are also helpful.  - Lying down worsens the cough - taking a cough drop (Ricola) helps stop.     She initially saw Dr. Awan also ordered a CT = negative.     Chronic cough was suspected and the patient was subsequently referred to our clinic.    Please see notes above for additional referral Hx.     She is retired, and lives with her , who is [place].    COUGH:  o Several months -had a severe cold and then accompanied by a cough, which has always been dry.   o Had COVID in the spring, but feels the cough started with her cold after.   o Dr. Kurtz - prednisone  o Antibiotic seemed to really help  o Frequent, moderate cough  o Sometimes she will develop more severe    THROAT CLEARING:  o Intermittent; does not bother her as much as her cough.    GLOBUS:  o No issues    SWALLOWING    No issues with swallowing any textures    Denies increased coughing with eating or after eating.     RESPIRATION    Occasionally she experiences some shortness of breath, but not sure if it is heart related or assoc with the cough.    Does not feel it is worse since she had the  severe cold.    ADDITIONAL    Reflux: occasionally experiences symptoms; on omeprazole 1x/day    Sense of taste and smell are off and on since COVID - not a problem.     OTHER PERTINENT HISTORY    Otherwise unremarkable.  Please also refer to Larry's dictation.     Past Medical History:   Diagnosis Date     CAD (coronary artery disease)      CAD s/p PCI+BMS to MRCA 10/2002, PCI to mLCX 2/2003, PCI+DESx2 to pLAD 11/2007, PCI+DESx1 to dRCA 12/2007, PCI+ALVAREZ to RPDA 7/2013; on indefinite DAPT  10/27/2002    10/27/2002: AMI s/p PCI+BMS (3.5x18mm Bx velocity) to mRCA 2/5/2003: Back pain; PCI+stent to mLCX c/b distal embolization/slow flow 4/11/2003: Unstable angina; PCI+stent (Hepacoat velocity) to mLAD 11/27/2007: PCI+DESx2 to pLAD (IVUS w/ calcification of LMCA and ulcerated plaque pLAD, 80% dRCA; also had 80% dLCX, too small to stent) 12/11/2007: Staged PCI. PCI+DESx1 (3.5x13mm Cypher) to dRCA (indefinite DAPT recommended at this time) 6/27/2008: Angina. mLCX stent 70% ISR. LAD and RCA stents patent. Myocardium at risk from LCX felt to be small, medical management preferred to PCI. 11/10/2009: Angina. Findings unchanged from 6/27/2008, FFR LAD 0.90. Medical management recommended. 7/23/2013: Unstable angina; PCI+ALVAREZ to mPDA. Diagnostic findings: LMCA 40% distal. LAD: pLAD stents patent mLAD 30% ISR dLAD 50% diffuse, D2 diffuse disease. LCX 80% mid ISR. RCA diffuse <30% mid, RPLAs diffuse disease, RPDA 100% occlusion.       Cardiac Pacemaker- Medtronic, dual chamber- NOT dependant 11/28/2007     CKD (chronic kidney disease), stage IV (H)      Hyperlipidemia LDL goal <70      Hypertension      Stented coronary artery 10-    RCA     Stented coronary artery 2-5-2003    LCx     Stented coronary artery 4-    LAD     Stented coronary artery 11-    LAD     Stented coronary artery 12-    RCA     Transient complete heart block (H) 11/28/2007     Past Surgical History:   Procedure Laterality Date      CHOLECYSTECTOMY       EP PACEMAKER N/A 10/15/2019    Procedure: EP PACEMAKER;  Surgeon: Anthony Zhu MD;  Location:  HE[facility] CARD[facility] CATH LAB     HC PPM INSERTION NEW/REPLACEMENT W/ ATRIAL&VENTRICULAR LEAD  11-     HYSTERECTOMY         OBJECTIVE  PATIENT REPORTED MEASURES  Patient Supplied Answers To VHI Questionnaire  No flowsheet data found.    Patient Supplied Answers To CSI Questionnaire  No flowsheet data found.    Patient Supplied Answers To EAT Questionnaire  No flowsheet data found.    PERCEPTUAL EVALUATION (CPT 89979)  POSTURE / TENSION/ PALPATION OF THE LARYNGEAL AREA:     WNL    BREATHING:     appears within normal limits and adequate     clavicular elevation on inspiration    LARYNGEAL PALPATION:  Per patient palpation.     firm musculature    right greater than left    palpation induced cough    VOICE:    Donal states that today is a typical voice today, with clinician observing voice quality to be characterized as:    Roughness: Mild Intermittent    Breathiness: WNL    Strain: WNL    Pitch:    F0/ Habitual Pitch:235 Hz; 695 Hz( induced a cough) - 196 Hz    Pitch glide: neurologically normal    Maximum Phonation Time: 45 seconds    Resonance:    Conversational speech:  laryngeal pharyngeal resonance    Loudness    Conversational speech:  WNL;  is Resighini    Projected speech:  WNL     Singing vs. Speech:  n/a    GLOBAL ASSESSMENT OF DYSPHONIA: 5/100    CAPE-V Overall Severity:  10/100    COUGH/THROAT CLEARING:    Frequent    Dry    Locus of cough/ throat clear: sounds consistent with upper airway       LARYNGEAL FUNCTION STUDIES (CPT 19835)  Not warranted.    LARYNGEAL EXAMINATION  Recommend to be completed when able to come into clinic.  Procedure: Flexible endoscopy with chip-tip technology without stroboscopy, right nostril; topical anesthesia with 3% Lidocaine and 0.25% phenylephrine was applied.   Performed by: Dr. Larry  The laryngeal and pharyngeal structures were evaluated for  gross appearance, mobility, function, and focal lesions / abnormalities of the associated mucosa.    All findings were within normal limits with the exception of the following salient features:   This exam shows:    Laryngeal and Vocal Fold Mucosa    Essentially healthy laryngeal mucosa    minimal presence of thickened secretions on the vocal folds and throughout the laryngeal area    Status of vocal fold mucosa:   o Within normal limits, with no lesions    Neurological and Functional Integrity of the Larynx    Vocal folds are mobile and meet at midline; movement is brisk and symmetric; exam is neurologically normal     Airway is patent    Elongation of the vocal folds for pitch increase is WNL    On phonation, glottic closure is mildly weak    Spindle-shaped configuration of during phonatory tasks    Supraglottic Function and Therapy Probes    mild anterior-posterior constriction of the supraglottic larynx during connected speech    THERAPY PROBES: Improvement was elicited with use of forward resonant stimuli, coordination of respiration and phonation, use of glottic coup to promote vocal fold closure and use of yawn sigh    The addition of stroboscopy allowed evaluation of the mucosal wave: Not warranted.       Abducted view - healthy larynx      Subtle incomplete adduction/ spindle shaped glottis.     The laryngeal exam was reviewed with MsAby Donal, and I provided pertinent explanations, as well as written and oral information.    ASSESSMENT / PLAN  IMPRESSIONS: Tessa Mansfield is a 86 year old female, presenting today with Irritable Larynx Syndrome (ILS) (J38.7), Chronic Cough (R05.3), Chronic Throat Clearing (R68.89) and Dysphonia (R49.0) in the context of Hx of onset with a severe URI following a bout of COVID-19 in early 2021, as evidenced by evaluation the results of the evaluation and laryngeal exam.   Remarkable findings included:    Perceptual evaluation demonstrated: mild intermittent  roughness    Laryngeal exam demonstrated: mild AP constriction during phonatory tasks. Mild spindle shaped glottic also primarily observed during phonatory tasks.    Primary complaint of patient today included: chronic cough; less concerned   Therefore, we recommended a course of speech therapy to address these concerns.    STIMULABILITY: results of therapy probes during perceptual and laryngeal evaluation demonstrate improvement with use of forward resonant stimuli, coordination of respiration and phonation, use of glottic coup to promote vocal fold closure and use of yawn sigh.    ADDITIONAL RECOMMENDATIONS:     A course of speech therapy is recommended to optimize vocal technique and help reduce chronic cough, throat clear and mucosal irritation.    She demonstrates a Good prognosis for improvement given adherence to therapeutic recommendations.     Positive indicators: positive response to therapy probes diagnosis is known to respond to treatment high level of comittment    Negative indicators: none    DURATION / FREQUENCY: 3 biweekly one-hour sessions with a member of the Voice SLP team.  A total of 6-8 sessions may be necessary.    Research: This patient is willing to be contacted about participation in research, but is over 80 years old.    GOALS:  Patient goal:   To understand the problem and fix it as much as possible  To reduce her cough to acceptable levels    Short-term goal(s): Within the first 4 sessions, Ms. Mansfield:  1. will demonstrate improved awareness of throat clearing / cough: acknowledging >75% of all cough events during session time with no clinician support  2. will be able to demonstrate provided cough suppression and substitution strategies from memory independently with 90% accuracy  3. will demonstrate semi-occluded vocal tract (SOVT) exercises with at least 80% accuracy with no clinician support    Long-term goal(s): In 6 months, Ms. Mansfield will:  1. Report a week with no more than 2  episodes of coughing, that do not last more than 2 seconds  2. Report resolution of symptoms, and use of optimal voice quality and comfort to meet personal, social, and professional needs, 90% of the time during a typical week of vocal activities    Certification period:   Certification date from: 1/06/22  Certification date to: 4/6/22      This treatment plan was developed with the patient who agreed with the recommendations.    TOTAL SERVICE TIME:   Call Initiated at: 1:58 PM  Call Ended at: 3:00           CPT Billing Codes:   EVALUATION OF VOICE AND RESONANCE (00854)  NO CHARGE FACILITY FEE (69018)      Hilary Howard M.M. (voice), M.A., CCC/SLP  Speech-Language Pathologist  [facility] Trained Vocologist  [facility]  [phone]  [email or web address]  Pronouns: she/her      *this report was created in part through the use of computerized dictation software, and though reviewed following completion, some typographic errors may persist.  If there is confusion regarding any of this notes contents, please contact me for clarification

## 2022-01-06 NOTE — LETTER
2022       RE: Tessa Mansfield  [address]  [facility] [address]     Dear Colleague,    Thank you for referring your patient, Tessa Mansfield, to the [facility] [place] at [facility]. Please see a copy of my visit note below.        [facility]   at the [facility]   Otolaryngology Clinic     Patient: Tessa Mansfield    MRN: [id number]    : 1935    Age/Gender: 86 year old female  Date of Service: 2022  Rendering Provider:   Henny Larry MD     Referring Provider   PCP: Pedro Gomez  Referring Physician: Pedro Gomez MD  [address] [place] [id number]  Reason for Consultation   Chronic cough  History   HISTORY OF PRESENT ILLNESS: I was asked to consult on Tessa Mansfield, by Dr. Gomez for evaluation of chronic cough . Ms. Mansfield is a 86 year old female who presents to us today with chronic cough.      Of note, she has a pacemaker and is on plavix.     she presents today for evaluation. she reports:    Dysphonia  - no voice concerns  - good volume     Dysphagia  - denies  - does not cough or choke when she eats food  - does not clear throat more with eating    Dyspnea  - occasional shortness of breath   - unsure if related to heart history    Throat clearing/cough  - progressively worse cough since 2021  - started after severe cold from COVID-19   - no strong triggers  - odors, talking, cold air do not trigger  - sometimes begins with a tickle in the back of the throat  - throat clearing less common  - Riccola cough drops help when cough is more severe  - taking a sip of water helps  - lying down makes cough worse  - no globus sensation  - cough does not increase with or after eating  - thinks throat clearing has become a habit  - denies vomiting during coughing spells  - does not cough more with talking or physical activity     GERD/LPRD   - does have GERD  -  on omeprazole   - still experiences occasional reflux symptoms   - sense of taste and smell are still off since COVID-19     PAST MEDICAL HISTORY:   Past Medical History:   Diagnosis Date     CAD (coronary artery disease)      CAD s/p PCI+BMS to MRCA 10/2002, PCI to mLCX 2/2003, PCI+DESx2 to pLAD 11/2007, PCI+DESx1 to dRCA 12/2007, PCI+ALVAREZ to RPDA 7/2013; on indefinite DAPT  10/27/2002    10/27/2002: AMI s/p PCI+BMS (3.5x18mm Bx velocity) to mRCA 2/5/2003: Back pain; PCI+stent to mLCX c/b distal embolization/slow flow 4/11/2003: Unstable angina; PCI+stent (Hepacoat velocity) to mLAD 11/27/2007: PCI+DESx2 to pLAD (IVUS w/ calcification of LMCA and ulcerated plaque pLAD, 80% dRCA; also had 80% dLCX, too small to stent) 12/11/2007: Staged PCI. PCI+DESx1 (3.5x13mm Cypher) to dRCA (indefinite DAPT recommended at this time) 6/27/2008: Angina. mLCX stent 70% ISR. LAD and RCA stents patent. Myocardium at risk from LCX felt to be small, medical management preferred to PCI. 11/10/2009: Angina. Findings unchanged from 6/27/2008, FFR LAD 0.90. Medical management recommended. 7/23/2013: Unstable angina; PCI+ALVAREZ to mPDA. Diagnostic findings: LMCA 40% distal. LAD: pLAD stents patent mLAD 30% ISR dLAD 50% diffuse, D2 diffuse disease. LCX 80% mid ISR. RCA diffuse <30% mid, RPLAs diffuse disease, RPDA 100% occlusion.       Cardiac Pacemaker- Medtronic, dual chamber- NOT dependant 11/28/2007     CKD (chronic kidney disease), stage IV (H)      Hyperlipidemia LDL goal <70      Hypertension      Stented coronary artery 10-    RCA     Stented coronary artery 2-5-2003    LCx     Stented coronary artery 4-    LAD     Stented coronary artery 11-    LAD     Stented coronary artery 12-    RCA     Transient complete heart block (H) 11/28/2007       PAST SURGICAL HISTORY:   Past Surgical History:   Procedure Laterality Date     CHOLECYSTECTOMY       EP PACEMAKER N/A 10/15/2019    Procedure: EP PACEMAKER;  Surgeon:  Anthony Zhu MD;  Location:  HEART CARDIAC CATH LAB     HC PPM INSERTION NEW/REPLACEMENT W/ ATRIAL&VENTRICULAR LEAD  11-     HYSTERECTOMY         CURRENT MEDICATIONS:   Current Outpatient Medications:      allopurinol (ZYLOPRIM) 100 MG tablet, Take 1 tablet (100 mg) by mouth daily, Disp: 90 tablet, Rfl: 3     omeprazole (PRILOSEC) 40 MG DR capsule, Take 1 capsule (40 mg) by mouth daily Take 1 capsule by mouth once daily, Disp: 90 capsule, Rfl: 0     aspirin 81 MG tablet, Take 1 tablet by mouth daily., Disp: , Rfl:      clopidogrel (PLAVIX) 75 MG tablet, Take 1 tablet (75 mg) by mouth daily, Disp: 90 tablet, Rfl: 3     cyanocolbalamin (VITAMIN B-12) 1000 MCG tablet, Take 500 mcg by mouth daily As directed, Disp: , Rfl:      doxycycline hyclate (VIBRAMYCIN) 100 MG capsule, Take 1 capsule (100 mg) by mouth 2 times daily, Disp: 20 capsule, Rfl: 0     furosemide (LASIX) 40 MG tablet, Take 1 tablet (40 mg) by mouth 2 times daily, Disp: 60 tablet, Rfl: 11     isosorbide mononitrate (ISMO/MONOKET) 20 MG tablet, Take 3 tablets (60 mg) by mouth 2 times daily, Disp: 540 tablet, Rfl: 1     methylPREDNISolone (MEDROL DOSEPAK) 4 MG tablet therapy pack, Follow Package Directions, Disp: 21 tablet, Rfl: 0     methylPREDNISolone (MEDROL DOSEPAK) 4 MG tablet therapy pack, Follow Package Directions, Disp: 21 tablet, Rfl: 0     metoprolol tartrate (LOPRESSOR) 100 MG tablet, Take 1 tablet (100 mg) by mouth 2 times daily, Disp: 180 tablet, Rfl: 3     Multiple Vitamins-Minerals (PRESERVISION AREDS 2+MULTI VIT PO), Take by mouth 2 times daily, Disp: , Rfl:      potassium chloride ER (K-TAB/KLOR-CON) 10 MEQ CR tablet, TAKE 2 TABLETS BY MOUTH IN THE MORNING AND 1 TABLET IN THE EVENING, Disp: 270 tablet, Rfl: 0     pravastatin (PRAVACHOL) 80 MG tablet, Take 1 tablet (80 mg) by mouth daily, Disp: 90 tablet, Rfl: 3     timolol maleate (ISTALOL) 0.5 % opthalmic solution, INSTILL 1 INTO EACH EYE ONCE DAILY IN THE MORNING, Disp: ,  Rfl:      timolol maleate (TIMOPTIC) 0.5 % ophthalmic solution, INSTILL 1 DROP INTO EACH EYE ONCE DAILY IN THE MORNING, Disp: , Rfl:      VITAMIN D3 25 MCG (1000 UT) tablet, TAKE 1 TABLET BY MOUTH ONCE DAILY **DUE  TO  BE  SEEN  FOR  FURTHER  REFILLS**, Disp: 30 tablet, Rfl: 0    ALLERGIES: Bactrim [sulfamethoxazole w/trimethoprim], Biaxin [clarithromycin], Chlorthalidone, Clonidine, Codeine sulfate, Darvon [propoxyphene hcl], Dilaudid [hydromorphone], Gabapentin, Indocin, Levaquin [levofloxacin hemihydrate], Lyrica, Morphine sulfate, Percocet [oxycodone-acetaminophen], Pregabalin, Shrimp, Spironolactone, Terazosin, Ciprofloxacin, and Simvastatin    SOCIAL HISTORY:    Social History     Socioeconomic History     Marital status:      Spouse name: Not on file     Number of children: 4     Years of education: Not on file     Highest education level: Not on file   Occupational History     Employer: [employer]   Tobacco Use     Smoking status: Former Smoker     Packs/day: 0.30     Years: 15.00     Pack years: 4.50     Types: Cigarettes     Smokeless tobacco: Never Used     Tobacco comment: Quit 22+ years ago   Substance and Sexual Activity     Alcohol use: Not on file     Drug use: No     Sexual activity: Not Currently     Partners: Male     Birth control/protection: Post-menopausal   Other Topics Concern      Service Not Asked     Blood Transfusions Yes     Caffeine Concern Not Asked     Occupational Exposure Not Asked     Hobby Hazards Not Asked     Sleep Concern Not Asked     Stress Concern Not Asked     Weight Concern Not Asked     Special Diet Not Asked     Back Care Not Asked     Exercise No     Bike Helmet Not Asked     Seat Belt Not Asked     Self-Exams Not Asked     Parent/sibling w/ CABG, MI or angioplasty before 65F 55M? Not Asked   Social History Narrative     Not on file     Social Determinants of Health     Financial Resource Strain: Not on file   Food Insecurity: Not on file    Transportation Needs: Not on file   Physical Activity: Not on file   Stress: Not on file   Social Connections: Not on file   Intimate Partner Violence: Not on file   Housing Stability: Not on file         FAMILY HISTORY:   Family History   Problem Relation Age of Onset     Cancer Sister 82        bladder cancer     Non-contributory for problems with anesthesia    REVIEW OF SYSTEMS:   The patient was asked a 14 point review of systems regarding constitutional symptoms, eye symptoms, ears, nose, mouth, throat symptoms, cardiovascular symptoms, respiratory symptoms, gastrointestinal symptoms, genitourinary symptoms, musculoskeletal symptoms, integumentary symptoms, neurological symptoms, psychiatric symptoms, endocrine symptoms, hematologic/lymphatic symptoms, and allergic/ immunologic symptoms.   The pertinent factors have been included in the HPI and below.  Patient Supplied Answers to Review of Systems  No flowsheet data found.    Physical Examination   The patient underwent a physical examination as described below. The pertinent positive and negative findings are summarized after the description of the examination.  Constitutional: The patient's developmental and nutritional status was assessed. The patient's voice quality was assessed.  Head and Face: The head and face were inspected for deformities. The sinuses were palpated. The salivary glands were palpated. Facial muscle strength was assessed bilaterally.  Eyes: Extraocular movements and primary gaze alignment were assessed.  Ears, Nose, Mouth and Throat: The ears and nose were examined for deformities. The nasal septum, mucosa, and turbinates were inspected by anterior rhinoscopy. The lips, teeth, and gums were examined for abnormalities. The oral mucosa, tongue, palate, tonsils, lateral and posterior pharynx were inspected for the presence of asymmetry or mucosal lesions.    Neck: The tracheal position was noted, and the neck mass palpated to determine if  there were any asymmetries, abnormal neck masses, thyromegally, or thyroid nodules.  Respiratory: The nature of the breathing and chest expansion/symmetry was observed.  Cardiovascular: The patient was examined to determine the presence of any edema or jugular venous distension.  Abdomen: The contour of the abdomen was noted.  Lymphatic: The patient was examined for infraclavicular lymphadenopathy.  Musculoskeletal: The patient was inspected for the presence of skeletal deformities.  Extremities: The extremities were examined for any clubbing or cyanosis.  Skin: The skin was examined for inflammatory or neoplastic conditions.  Neurologic: The patient's orientation, mood, and affect were noted. The cranial nerve  functions were examined.  Other pertinent positive and negative findings on physical examination:      OC/OP: no lesions, uvula midline, soft palate elevates symmetrically  Neck: no lesions, no TH tenderness to palpation, bilateral parathyroid space pressure causes cough    All other physical examination findings were within normal limits and noncontributory.  Procedures   Flexible laryngoscopy (CPT 25694)      Pre-procedure diagnosis: chronic cough  Post-procedure diagnosis: same as above  Indication for procedure: Ms. Mansfield is a 86 year old female with see above  Procedure(s): Fiberoptic Laryngoscopy    Details of Procedure: After informed consent was obtained, the patient was seated in the examination chair.  The areas of the nasopharynx as well as the hypopharynx were anesthetized with topical 4% lidocaine with 0.25% phenylephrine atomizer.  Examination of the base of tongue was performed first.  Attention was directed to any evidence of masses in the area or evidence of leukoplakia or candidal infection.  Attention was directed to the epiglottis where its size and position was determined and its movement on phonation of the vowel  e .  The piriform sinuses were then inspected for any mass lesions or  pooling of secretions.  Attention was then directed to the larynx. The vocal folds were inspected for infection or any areas of leukoplakia, for masses, polypoid degeneration, or hemorrhage.  Having done this, the arytenoids and vocal processes were inspected for erythema or evidence of granuloma formation.  The posterior commissure was then inspected for evidence of inflammatory changes in the mucosa and heaping up of mucosal tissue. The patient was then instructed to say the vowel  e .  Adduction of vocal folds to the midline was observed for any evidence of paresis or paralysis of the larynx or asymmetry in rotation of the larynx to the left or right. The patient was asked to breathe and the degree of abduction was noted bilaterally.  Subglottic view of the larynx was obtained for any additional mass lesions or mucosal changes.  Finally the post cricoid was examined for evidence of pooling of secretions, as well as the pharyngeal wall mucosa.   Anesthesia type: 0.25% phenylephrine    Findings:  Anatomic/physiological deviations: via R NC, mild mucus over vocal folds, presbylarynx    Right vocal process: No restriction of mobility   Left vocal process: No restriction of mobility  Glottal gap: glottal gap  Supraglottic structures: Normal  Hypopharynx: Normal     Estimated Blood Loss: minimal  Complications: None  Disposition: Patient tolerated the procedure well              Review of Relevant Clinical Data   I personally reviewed:  Notes: Dr. Gomez 11/23/21  Radiology: CT chest 09/29/2021  1. No acute consolidative opacity.  2. Nonspecific subpleural nodules in the right lower lobe. Recommend  follow-up CT in 6-12 months with special attention to the 8   millimeter right lower lobe groundglass opacity.      XR Chest 09/10/2020  No evidence of active cardiopulmonary disease.        US Head Neck 07/10/2019  Normal grayscale evaluation of the soft tissue overlying  the left mandible. No focal lesion  identified.    Procedures: EGD 2006  Normal esophagus.   Normal stomach.   Normal examined duodenum.  Labs:  Lab Results   Component Value Date    TSH 3.28 2021     Lab Results   Component Value Date     2021    CO2 26 2021    BUN 38 (H) 2021    PHOS 3.3 2021     Lab Results   Component Value Date    WBC 7.0 10/01/2021    HGB 10.9 (L) 10/01/2021    HCT 35.8 10/01/2021    MCV 96 10/01/2021    PLT 97 (L) 10/01/2021     Lab Results   Component Value Date    PTT 37 10/01/2021    INR 1.06 10/19/2021     No results found for: ROBERTO  No components found for: RHEUMATOIDFACTOR,  RF  Lab Results   Component Value Date    CRP 3.5 10/01/2021    CRP 5.7 2010    CRP 44.4 (H) 2010     No components found for: CKTOT, URICACID  No components found for: C3, C4, DSDNAAB, NDNAABIFA  No results found for: MPOAB    Patient reported Quality of Life (QOL) Measures   Patient Supplied Answers To VHI Questionnaire  No flowsheet data found.      Patient Supplied Answers To EAT Questionnaire  No flowsheet data found.      Patient Supplied Answers To CSI Questionnaire  No flowsheet data found.      Patient Supplied Answers to Dyspnea Index Questionnaire:  No flowsheet data found.    Impression & Plan     IMPRESSION: Ms. Mansfield is a 86 year old female who is being seen for the followin. Chronic cough/throat clearing   - started since having COVID-19 in 2021   - has worsened overtime  - it is throat clearing more frequently  - less frequently it is coughing  - feels mucus in her throat as her trigger  - thinks throat clearing has become a habit  - no ACEI   - allergy triggers and work up: denies   - pulmonary triggers and work up: had CT chest on 2021 with patent subglottis, not worse with physical activity   - GI triggers and work up: does have GERD on omeprazole, associated with laying down, not worse with eating  - ENT triggers and work up: feels sensation of mucus, not sure if  it is worse with talking  - scope today 01/06/2022 shows 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 All documentation has been reviewed by the aforementioned provider prior to being entered into the official medical record.     Thank you for the kind referral and for allowing me to share in the care of Ms. Mansfield. If you have any questions, please do not hesitate to contact me.    Henny Larry MD    Laryngology    [facility]  Department of  Otolaryngology - Head and Neck Surgery  [facility] [place] [address]  Appointment line: [phone]  Fax: [phone]  [email or web address]

## 2022-01-17 DIAGNOSIS — E87.6 HYPOKALEMIA: ICD-10-CM

## 2022-01-18 ENCOUNTER — TELEPHONE (OUTPATIENT)
Dept: PULMONOLOGY | Facility: CLINIC | Age: 87
End: 2022-01-18
Payer: COMMERCIAL

## 2022-01-18 DIAGNOSIS — R05.3 CHRONIC COUGH: Primary | ICD-10-CM

## 2022-01-18 RX ORDER — POTASSIUM CHLORIDE 750 MG/1
TABLET, EXTENDED RELEASE ORAL
Qty: 270 TABLET | Refills: 0 | Status: SHIPPED | OUTPATIENT
Start: 2022-01-18 | End: 2022-04-21

## 2022-01-18 NOTE — TELEPHONE ENCOUNTER
Spoke with pt regarding scheduling CXR prior to NEW appt with Dr. Blair next week. This was arranged and details confirmed with pt

## 2022-01-24 ENCOUNTER — OFFICE VISIT (OUTPATIENT)
Dept: PULMONOLOGY | Facility: CLINIC | Age: 87
End: 2022-01-24
Attending: FAMILY MEDICINE
Payer: COMMERCIAL

## 2022-01-24 ENCOUNTER — PRE VISIT (OUTPATIENT)
Dept: PULMONOLOGY | Facility: CLINIC | Age: 87
End: 2022-01-24

## 2022-01-24 ENCOUNTER — ANCILLARY PROCEDURE (OUTPATIENT)
Dept: GENERAL RADIOLOGY | Facility: CLINIC | Age: 87
End: 2022-01-24
Attending: INTERNAL MEDICINE
Payer: COMMERCIAL

## 2022-01-24 VITALS — OXYGEN SATURATION: 97 % | DIASTOLIC BLOOD PRESSURE: 92 MMHG | HEART RATE: 82 BPM | SYSTOLIC BLOOD PRESSURE: 179 MMHG

## 2022-01-24 DIAGNOSIS — R05.3 CHRONIC COUGH: ICD-10-CM

## 2022-01-24 DIAGNOSIS — N25.81 SECONDARY RENAL HYPERPARATHYROIDISM (H): ICD-10-CM

## 2022-01-24 DIAGNOSIS — R91.1 LUNG NODULE: Primary | ICD-10-CM

## 2022-01-24 DIAGNOSIS — I21.4 NSTEMI (NON-ST ELEVATED MYOCARDIAL INFARCTION) (H): ICD-10-CM

## 2022-01-24 DIAGNOSIS — Z95.820 S/P ANGIOPLASTY WITH STENT: ICD-10-CM

## 2022-01-24 DIAGNOSIS — N18.4 CKD (CHRONIC KIDNEY DISEASE) STAGE 4, GFR 15-29 ML/MIN (H): ICD-10-CM

## 2022-01-24 PROCEDURE — G0463 HOSPITAL OUTPT CLINIC VISIT: HCPCS | Mod: 25

## 2022-01-24 PROCEDURE — 99204 OFFICE O/P NEW MOD 45 MIN: CPT | Mod: 25 | Performed by: INTERNAL MEDICINE

## 2022-01-24 PROCEDURE — 71046 X-RAY EXAM CHEST 2 VIEWS: CPT | Mod: GC | Performed by: RADIOLOGY

## 2022-01-24 PROCEDURE — 94729 DIFFUSING CAPACITY: CPT | Performed by: INTERNAL MEDICINE

## 2022-01-24 PROCEDURE — 94375 RESPIRATORY FLOW VOLUME LOOP: CPT | Performed by: INTERNAL MEDICINE

## 2022-01-24 PROCEDURE — 94726 PLETHYSMOGRAPHY LUNG VOLUMES: CPT | Performed by: INTERNAL MEDICINE

## 2022-01-24 RX ORDER — DOXYCYCLINE HYCLATE 100 MG
100 TABLET ORAL 2 TIMES DAILY
Qty: 20 TABLET | Refills: 0 | Status: SHIPPED | OUTPATIENT
Start: 2022-01-24 | End: 2022-02-03

## 2022-01-24 RX ORDER — PREDNISONE 20 MG/1
20 TABLET ORAL DAILY
Qty: 10 TABLET | Refills: 0 | Status: SHIPPED | OUTPATIENT
Start: 2022-01-24 | End: 2022-02-03

## 2022-01-24 RX ORDER — CHOLECALCIFEROL (VITAMIN D3) 25 MCG
TABLET ORAL
Qty: 30 TABLET | Refills: 0 | Status: SHIPPED | OUTPATIENT
Start: 2022-01-24 | End: 2022-02-22

## 2022-01-24 ASSESSMENT — ENCOUNTER SYMPTOMS
CHILLS: 0
HEMOPTYSIS: 0
SPUTUM PRODUCTION: 0
FEVER: 0
WEIGHT LOSS: 0
COUGH: 1
SHORTNESS OF BREATH: 0

## 2022-01-24 NOTE — LETTER
1/24/2022    RE: Tessa Mansfield  [address]  [facility] [address]    Dear Colleague,    Thank you for referring your patient, Tessa Mansfield, to the [facility] LUNG [facility] AND [facility] [place]. Please see a copy of my visit note below.              Pulmonary Clinic  New Patient Evaluation    Name: Tessa Mansfield MRN: [id number]     Age: 86 year old   YOB: 1935             HPI:   CC: Chronic cough    Tessa Mansfield is a 86 year old female with H/O coronary artery disease with pacemaker and history of Covid in 2021 who presents to discuss chronic dry cough present since spring 2021.    She and her  will contracted Covid in February 2021 with fatigue and mild malaise but no fever or respiratory symptoms.  She completely recovered from that, but then sometime during the spring 2021 she developed a nagging dry cough.  There were no recognized triggers or infections preceding the cough.  She presented to her primary care provider in June and was given a Medrol Dosepak with resolution of her symptoms, however a short time after completing this her cough returned at its pretreatment level.  Medrol Dosepak was repeated in September, again with transient resolution of her cough.  She was treated with a 10-day course of doxycycline in November which also resulted in transient resolution of her cough.  Following steroids she feels that her resolution may have been slightly longer lived, however the cough eventually returned to its original state.  The cough occasionally produces scant thick mucus but is predominantly dry.  She does not have any dyspnea or wheezing.  She has not noticed any voice changes.  She has not had any episodes of choking.  She is not noted increased throat clearing.  She does not have sinus congestion or postnasal drainage.  She has GERD which is well controlled on omeprazole with very rare breakthrough symptoms..  She is not on an ACE or  ARB.  She has been evaluated by ENT including laryngoscopy without identified etiology.  She is been referred for speech therapy but this has not begun yet.      Exposure History:   Tobacco:  Social smoker approximately 15 years.  Estimates an average of less than 10 cigarettes/day.  Quit 30 years ago.   Other inhaled substances (Vaping, hookah. Marijuana): No   Occupation: [employer]   Pets: No  Allergies: None            Past Medical History:     Past Medical History:   Diagnosis Date     CAD (coronary artery disease)      CAD s/p PCI+BMS to MRCA 10/2002, PCI to mLCX 2/2003, PCI+DESx2 to pLAD 11/2007, PCI+DESx1 to dRCA 12/2007, PCI+ALVAREZ to RPDA 7/2013; on indefinite DAPT  10/27/2002    10/27/2002: AMI s/p PCI+BMS (3.5x18mm Bx velocity) to mRCA 2/5/2003: Back pain; PCI+stent to mLCX c/b distal embolization/slow flow 4/11/2003: Unstable angina; PCI+stent (Hepacoat velocity) to mLAD 11/27/2007: PCI+DESx2 to pLAD (IVUS w/ calcification of LMCA and ulcerated plaque pLAD, 80% dRCA; also had 80% dLCX, too small to stent) 12/11/2007: Staged PCI. PCI+DESx1 (3.5x13mm Cypher) to dRCA (indefinite DAPT recommended at this time) 6/27/2008: Angina. mLCX stent 70% ISR. LAD and RCA stents patent. Myocardium at risk from LCX felt to be small, medical management preferred to PCI. 11/10/2009: Angina. Findings unchanged from 6/27/2008, FFR LAD 0.90. Medical management recommended. 7/23/2013: Unstable angina; PCI+ALVAREZ to mPDA. Diagnostic findings: LMCA 40% distal. LAD: pLAD stents patent mLAD 30% ISR dLAD 50% diffuse, D2 diffuse disease. LCX 80% mid ISR. RCA diffuse <30% mid, RPLAs diffuse disease, RPDA 100% occlusion.       Cardiac Pacemaker- Medtronic, dual chamber- NOT dependant 11/28/2007     CKD (chronic kidney disease), stage IV (H)      Hyperlipidemia LDL goal <70      Hypertension      Stented coronary artery 10-    RCA     Stented coronary artery 2-5-2003    LCx     Stented coronary artery 4-     LAD     Stented coronary artery 11-    LAD     Stented coronary artery 12-    RCA     Transient complete heart block (H) 11/28/2007             Past Surgical History:      Past Surgical History:   Procedure Laterality Date     CHOLECYSTECTOMY       EP PACEMAKER N/A 10/15/2019    Procedure: EP PACEMAKER;  Surgeon: Anthony Zhu MD;  Location:  HE[facility] CARD[facility] CATH LAB     HC PPM INSERTION NEW/REPLACEMENT W/ ATRIAL&VENTRICULAR LEAD  11-     HYSTERECTOMY               Social History:     Social History     Socioeconomic History     Marital status:      Spouse name: Not on file     Number of children: 4     Years of education: Not on file     Highest education level: Not on file   Occupational History     Employer: [employer]   Tobacco Use     Smoking status: Former Smoker     Packs/day: 0.30     Years: 15.00     Pack years: 4.50     Types: Cigarettes     Smokeless tobacco: Never Used     Tobacco comment: Quit 22+ years ago   Substance and Sexual Activity     Alcohol use: Not on file     Drug use: No     Sexual activity: Not Currently     Partners: Male     Birth control/protection: Post-menopausal   Other Topics Concern      Service Not Asked     Blood Transfusions Yes     Caffeine Concern Not Asked     Occupational Exposure Not Asked     Hobby Hazards Not Asked     Sleep Concern Not Asked     Stress Concern Not Asked     Weight Concern Not Asked     Special Diet Not Asked     Back Care Not Asked     Exercise No     Bike Helmet Not Asked     Seat Belt Not Asked     Self-Exams Not Asked     Parent/sibling w/ CABG, MI or angioplasty before 65F 55M? Not Asked   Social History Narrative     Not on file     Social Determinants of Health     Financial Resource Strain: Not on file   Food Insecurity: Not on file   Transportation Needs: Not on file   Physical Activity: Not on file   Stress: Not on file   Social Connections: Not on file   Intimate Partner Violence: Not on  "file   Housing Stability: Not on file            Family History:     Family History   Problem Relation Age of Onset     Cancer Sister 82        bladder cancer             Immunizations:     Immunization History   Administered Date(s) Administered     COVID-19,PF,Moderna 03/13/2021     COVID-19,PF,Moderna Booster 11/23/2021     Flu 65+ Years 10/04/2018, 10/18/2019     Influenza (H1N1) 02/09/2010     Influenza (High Dose) 3 valent vaccine 12/18/2015, 09/28/2016, 10/11/2017     Influenza (IIV3) PF 10/29/2004, 11/02/2005, 10/24/2006, 10/31/2007, 12/19/2008, 10/16/2010, 09/19/2012, 12/30/2014     Pneumo Conj 13-V (2010&after) 12/30/2014     Pneumococcal 23 valent 12/06/2002, 09/19/2012     TD (ADULT, 7+) 12/06/2002, 02/13/2004     TDAP Vaccine (Boostrix) 09/11/2012     Tdap (Adacel,Boostrix) 02/21/2013     Zoster vaccine, live 02/18/2011             Allergies:     Allergies   Allergen Reactions     Bactrim [Sulfamethoxazole W/Trimethoprim] Other (See Comments)     Patient unable to recall     Biaxin [Clarithromycin]      Chlorthalidone Nausea and Vomiting     Clonidine      Codeine Sulfate Itching     Darvon [Propoxyphene Hcl]      Dilaudid [Hydromorphone] Visual Disturbance     Hallucinations       Gabapentin Other (See Comments) and Fatigue     \"felt drunk\"     Indocin      Levaquin [Levofloxacin Hemihydrate]      Lyrica Fatigue     Morphine Sulfate      Percocet [Oxycodone-Acetaminophen] Other (See Comments)     hallucinations     Pregabalin Itching     Other reaction(s): Fatigue     Shrimp Swelling     Spironolactone Other (See Comments)     Dehydrated       Terazosin      Ciprofloxacin Rash     Simvastatin Palpitations     Muscle weakness, leg cramping               Medications:   allopurinol (ZYLOPRIM) 100 MG tablet, Take 1 tablet (100 mg) by mouth daily  aspirin 81 MG tablet, Take 1 tablet by mouth daily.  clopidogrel (PLAVIX) 75 MG tablet, Take 1 tablet (75 mg) by mouth daily  cyanocolbalamin (VITAMIN B-12) 1000 " MCG tablet, Take 500 mcg by mouth daily As directed  doxycycline hyclate (VIBRAMYCIN) 100 MG capsule, Take 1 capsule (100 mg) by mouth 2 times daily  furosemide (LASIX) 40 MG tablet, Take 1 tablet (40 mg) by mouth 2 times daily  isosorbide mononitrate (ISMO/MONOKET) 20 MG tablet, Take 3 tablets (60 mg) by mouth 2 times daily  methylPREDNISolone (MEDROL DOSEPAK) 4 MG tablet therapy pack, Follow Package Directions  methylPREDNISolone (MEDROL DOSEPAK) 4 MG tablet therapy pack, Follow Package Directions  metoprolol tartrate (LOPRESSOR) 100 MG tablet, Take 1 tablet (100 mg) by mouth 2 times daily  Multiple Vitamins-Minerals (PRESERVISION AREDS 2+MULTI VIT PO), Take by mouth 2 times daily  omeprazole (PRILOSEC) 40 MG DR capsule, Take 1 capsule (40 mg) by mouth daily Take 1 capsule by mouth once daily  potassium chloride ER (K-TAB/KLOR-CON) 10 MEQ CR tablet, TAKE 2 TABLETS BY MOUTH IN THE MORNING AND 1 TABLET IN THE EVENING  pravastatin (PRAVACHOL) 80 MG tablet, Take 1 tablet (80 mg) by mouth daily  timolol maleate (ISTALOL) 0.5 % opthalmic solution, INSTILL 1 INTO EACH EYE ONCE DAILY IN THE MORNING  timolol maleate (TIMOPTIC) 0.5 % ophthalmic solution, INSTILL 1 DROP INTO EACH EYE ONCE DAILY IN THE MORNING    No current facility-administered medications on file prior to visit.           Review of Systems:     Review of Systems   Constitutional: Negative for chills, fever and weight loss.   Respiratory: Positive for cough. Negative for hemoptysis, sputum production and shortness of breath.    Cardiovascular: Negative for chest pain.              Exam:   BP (!) 179/92   Pulse 82   SpO2 97%     Physical Exam  Vitals and nursing note reviewed.   Constitutional:       Appearance: Normal appearance. She is normal weight.   Cardiovascular:      Rate and Rhythm: Normal rate and regular rhythm.      Heart sounds: No murmur heard.      Pulmonary:      Effort: Pulmonary effort is normal.      Breath sounds: No rhonchi or rales.       Comments: Faint wheeze in R base that cleared with subsequent breaths  Abdominal:      Palpations: Abdomen is soft.   Musculoskeletal:      Right lower leg: No edema.      Left lower leg: No edema.   Skin:     General: Skin is warm and dry.   Neurological:      Mental Status: She is alert.       Fingernails without clubbing         Data:     PFT: 1/24/2022    The FVC, FEV1, and FEV1/FVC ratio are within normal limits.  The lung volumes are within normal ones.  The diffusion capacity is reduced, however the diffusion capacity is not corrected for the patient's hemoglobin.    IMPRESSION:  Normal spirometry and lung volumes.  Possible reduced diffusion opacity, however the patient does appear to suffer from chronic anemia and her current testing is not corrected for this.    CT CHEST 9/29/21  1. No acute consolidative opacity.  2. Nonspecific subpleural nodules in the right lower lobe. Recommend  follow-up CT in 6-12 months with special attention to the 8   millimeter right lower lobe groundglass opacity.      All studies listed here were independently reviewed and interpreted by me today.          Assessment and Plan:     ## Chronic cough  No identified inciting event, dry cough without dyspnea or wheeze or any other symptoms.  Onset a few months after Covid infection, however she did not have any respiratory symptoms with her Covid.  CT scan with a few nodules but no other airspace diseases.  PFTs within normal limits with exception of possibly reduced DLCO (likely artifact secondary to anemia).  Faint wheeze on exam.  Has previously had great response to steroid or doxycycline with return of symptoms after completion of these therapies.  At this time we will try a longer course of steroid combined with an antibiotic.  If this does not resolve her cough, she may benefit from an inhaled steroid.  -Prednisone 20 mg daily for 10 days  -Doxycycline 100 mg twice daily for 10 days  -Continue speech therapy as previously  arranged      ## Pulmonary nodules  Multiple incidental identified subpleural nodules, largest which measures 8 mm.  -Referral to pulmonary nodule clinic for longitudinal follow-up      Return to clinic: 2 months    I personally spent 45 minutes on the date of the encounter doing chart review, history and exam, documentation and further activities per the note.    Chad Blair M.D.  Pulmonary & Critical Care  Pager: Click Here to page      The above note was dictated using voice recognition software and may include typographical errors. Please contact the author for any clarifications.

## 2022-01-24 NOTE — PROGRESS NOTES
Pulmonary Clinic  New Patient Evaluation    Name: Tessa Mansfield MRN: [id number]     Age: 86 year old   YOB: 1935             HPI:   CC: Chronic cough    Tessa Mansfield is a 86 year old female with H/O coronary artery disease with pacemaker and history of Covid in 2021 who presents to discuss chronic dry cough present since spring 2021.    She and her  will contracted Covid in February 2021 with fatigue and mild malaise but no fever or respiratory symptoms.  She completely recovered from that, but then sometime during the spring 2021 she developed a nagging dry cough.  There were no recognized triggers or infections preceding the cough.  She presented to her primary care provider in June and was given a Medrol Dosepak with resolution of her symptoms, however a short time after completing this her cough returned at its pretreatment level.  Medrol Dosepak was repeated in September, again with transient resolution of her cough.  She was treated with a 10-day course of doxycycline in November which also resulted in transient resolution of her cough.  Following steroids she feels that her resolution may have been slightly longer lived, however the cough eventually returned to its original state.  The cough occasionally produces scant thick mucus but is predominantly dry.  She does not have any dyspnea or wheezing.  She has not noticed any voice changes.  She has not had any episodes of choking.  She is not noted increased throat clearing.  She does not have sinus congestion or postnasal drainage.  She has GERD which is well controlled on omeprazole with very rare breakthrough symptoms..  She is not on an ACE or ARB.  She has been evaluated by ENT including laryngoscopy without identified etiology.  She is been referred for speech therapy but this has not begun yet.      Exposure History:   Tobacco:  Social smoker approximately 15 years.  Estimates an average of less than 10  cigarettes/day.  Quit 30 years ago.   Other inhaled substances (Vaping, hookah. Marijuana): No   Occupation: [employer] ortho   Pets: No  Allergies: None            Past Medical History:     Past Medical History:   Diagnosis Date     CAD (coronary artery disease)      CAD s/p PCI+BMS to MRCA 10/2002, PCI to mLCX 2/2003, PCI+DESx2 to pLAD 11/2007, PCI+DESx1 to dRCA 12/2007, PCI+ALVAREZ to RPDA 7/2013; on indefinite DAPT  10/27/2002    10/27/2002: AMI s/p PCI+BMS (3.5x18mm Bx velocity) to mRCA 2/5/2003: Back pain; PCI+stent to mLCX c/b distal embolization/slow flow 4/11/2003: Unstable angina; PCI+stent (Hepacoat velocity) to mLAD 11/27/2007: PCI+DESx2 to pLAD (IVUS w/ calcification of LMCA and ulcerated plaque pLAD, 80% dRCA; also had 80% dLCX, too small to stent) 12/11/2007: Staged PCI. PCI+DESx1 (3.5x13mm Cypher) to dRCA (indefinite DAPT recommended at this time) 6/27/2008: Angina. mLCX stent 70% ISR. LAD and RCA stents patent. Myocardium at risk from LCX felt to be small, medical management preferred to PCI. 11/10/2009: Angina. Findings unchanged from 6/27/2008, FFR LAD 0.90. Medical management recommended. 7/23/2013: Unstable angina; PCI+ALVAREZ to mPDA. Diagnostic findings: LMCA 40% distal. LAD: pLAD stents patent mLAD 30% ISR dLAD 50% diffuse, D2 diffuse disease. LCX 80% mid ISR. RCA diffuse <30% mid, RPLAs diffuse disease, RPDA 100% occlusion.       Cardiac Pacemaker- Medtronic, dual chamber- NOT dependant 11/28/2007     CKD (chronic kidney disease), stage IV (H)      Hyperlipidemia LDL goal <70      Hypertension      Stented coronary artery 10-    RCA     Stented coronary artery 2-5-2003    LCx     Stented coronary artery 4-    LAD     Stented coronary artery 11-    LAD     Stented coronary artery 12-    RCA     Transient complete heart block (H) 11/28/2007             Past Surgical History:      Past Surgical History:   Procedure Laterality Date     CHOLECYSTECTOMY       EP  PACEMAKER N/A 10/15/2019    Procedure: EP PACEMAKER;  Surgeon: Anthony Zhu MD;  Location:  [facility]     HC PPM INSERTION NEW/REPLACEMENT W/ ATRIAL&VENTRICULAR LEAD  11-     HYSTERECTOMY               Social History:     Social History     Socioeconomic History     Marital status:      Spouse name: Not on file     Number of children: 4     Years of education: Not on file     Highest education level: Not on file   Occupational History     Employer: [employer]   Tobacco Use     Smoking status: Former Smoker     Packs/day: 0.30     Years: 15.00     Pack years: 4.50     Types: Cigarettes     Smokeless tobacco: Never Used     Tobacco comment: Quit 22+ years ago   Substance and Sexual Activity     Alcohol use: Not on file     Drug use: No     Sexual activity: Not Currently     Partners: Male     Birth control/protection: Post-menopausal   Other Topics Concern      Service Not Asked     Blood Transfusions Yes     Caffeine Concern Not Asked     Occupational Exposure Not Asked     Hobby Hazards Not Asked     Sleep Concern Not Asked     Stress Concern Not Asked     Weight Concern Not Asked     Special Diet Not Asked     Back Care Not Asked     Exercise No     Bike Helmet Not Asked     Seat Belt Not Asked     Self-Exams Not Asked     Parent/sibling w/ CABG, MI or angioplasty before 65F 55M? Not Asked   Social History Narrative     Not on file     Social Determinants of Health     Financial Resource Strain: Not on file   Food Insecurity: Not on file   Transportation Needs: Not on file   Physical Activity: Not on file   Stress: Not on file   Social Connections: Not on file   Intimate Partner Violence: Not on file   Housing Stability: Not on file            Family History:     Family History   Problem Relation Age of Onset     Cancer Sister 82        bladder cancer             Immunizations:     Immunization History   Administered Date(s) Administered      "COVID-19,PF,Moderna 03/13/2021     COVID-19,PF,Moderna Booster 11/23/2021     Flu 65+ Years 10/04/2018, 10/18/2019     Influenza (H1N1) 02/09/2010     Influenza (High Dose) 3 valent vaccine 12/18/2015, 09/28/2016, 10/11/2017     Influenza (IIV3) PF 10/29/2004, 11/02/2005, 10/24/2006, 10/31/2007, 12/19/2008, 10/16/2010, 09/19/2012, 12/30/2014     Pneumo Conj 13-V (2010&after) 12/30/2014     Pneumococcal 23 valent 12/06/2002, 09/19/2012     TD (ADULT, 7+) 12/06/2002, 02/13/2004     TDAP Vaccine (Boostrix) 09/11/2012     Tdap (Adacel,Boostrix) 02/21/2013     Zoster vaccine, live 02/18/2011             Allergies:     Allergies   Allergen Reactions     Bactrim [Sulfamethoxazole W/Trimethoprim] Other (See Comments)     Patient unable to recall     Biaxin [Clarithromycin]      Chlorthalidone Nausea and Vomiting     Clonidine      Codeine Sulfate Itching     Darvon [Propoxyphene Hcl]      Dilaudid [Hydromorphone] Visual Disturbance     Hallucinations       Gabapentin Other (See Comments) and Fatigue     \"felt drunk\"     Indocin      Levaquin [Levofloxacin Hemihydrate]      Lyrica Fatigue     Morphine Sulfate      Percocet [Oxycodone-Acetaminophen] Other (See Comments)     hallucinations     Pregabalin Itching     Other reaction(s): Fatigue     Shrimp Swelling     Spironolactone Other (See Comments)     Dehydrated       Terazosin      Ciprofloxacin Rash     Simvastatin Palpitations     Muscle weakness, leg cramping               Medications:   allopurinol (ZYLOPRIM) 100 MG tablet, Take 1 tablet (100 mg) by mouth daily  aspirin 81 MG tablet, Take 1 tablet by mouth daily.  clopidogrel (PLAVIX) 75 MG tablet, Take 1 tablet (75 mg) by mouth daily  cyanocolbalamin (VITAMIN B-12) 1000 MCG tablet, Take 500 mcg by mouth daily As directed  doxycycline hyclate (VIBRAMYCIN) 100 MG capsule, Take 1 capsule (100 mg) by mouth 2 times daily  furosemide (LASIX) 40 MG tablet, Take 1 tablet (40 mg) by mouth 2 times daily  isosorbide mononitrate " (ISMO/MONOKET) 20 MG tablet, Take 3 tablets (60 mg) by mouth 2 times daily  methylPREDNISolone (MEDROL DOSEPAK) 4 MG tablet therapy pack, Follow Package Directions  methylPREDNISolone (MEDROL DOSEPAK) 4 MG tablet therapy pack, Follow Package Directions  metoprolol tartrate (LOPRESSOR) 100 MG tablet, Take 1 tablet (100 mg) by mouth 2 times daily  Multiple Vitamins-Minerals (PRESERVISION AREDS 2+MULTI VIT PO), Take by mouth 2 times daily  omeprazole (PRILOSEC) 40 MG DR capsule, Take 1 capsule (40 mg) by mouth daily Take 1 capsule by mouth once daily  potassium chloride ER (K-TAB/KLOR-CON) 10 MEQ CR tablet, TAKE 2 TABLETS BY MOUTH IN THE MORNING AND 1 TABLET IN THE EVENING  pravastatin (PRAVACHOL) 80 MG tablet, Take 1 tablet (80 mg) by mouth daily  timolol maleate (ISTALOL) 0.5 % opthalmic solution, INSTILL 1 INTO EACH EYE ONCE DAILY IN THE MORNING  timolol maleate (TIMOPTIC) 0.5 % ophthalmic solution, INSTILL 1 DROP INTO EACH EYE ONCE DAILY IN THE MORNING    No current facility-administered medications on file prior to visit.           Review of Systems:     Review of Systems   Constitutional: Negative for chills, fever and weight loss.   Respiratory: Positive for cough. Negative for hemoptysis, sputum production and shortness of breath.    Cardiovascular: Negative for chest pain.              Exam:   BP (!) 179/92   Pulse 82   SpO2 97%     Physical Exam  Vitals and nursing note reviewed.   Constitutional:       Appearance: Normal appearance. She is normal weight.   Cardiovascular:      Rate and Rhythm: Normal rate and regular rhythm.      Heart sounds: No murmur heard.      Pulmonary:      Effort: Pulmonary effort is normal.      Breath sounds: No rhonchi or rales.      Comments: Faint wheeze in R base that cleared with subsequent breaths  Abdominal:      Palpations: Abdomen is soft.   Musculoskeletal:      Right lower leg: No edema.      Left lower leg: No edema.   Skin:     General: Skin is warm and dry.    Neurological:      Mental Status: She is alert.       Fingernails without clubbing         Data:     PFT: 1/24/2022    The FVC, FEV1, and FEV1/FVC ratio are within normal limits.  The lung volumes are within normal ones.  The diffusion capacity is reduced, however the diffusion capacity is not corrected for the patient's hemoglobin.    IMPRESSION:  Normal spirometry and lung volumes.  Possible reduced diffusion opacity, however the patient does appear to suffer from chronic anemia and her current testing is not corrected for this.    CT CHEST 9/29/21  1. No acute consolidative opacity.  2. Nonspecific subpleural nodules in the right lower lobe. Recommend  follow-up CT in 6-12 months with special attention to the 8   millimeter right lower lobe groundglass opacity.      All studies listed here were independently reviewed and interpreted by me today.          Assessment and Plan:     ## Chronic cough  No identified inciting event, dry cough without dyspnea or wheeze or any other symptoms.  Onset a few months after Covid infection, however she did not have any respiratory symptoms with her Covid.  CT scan with a few nodules but no other airspace diseases.  PFTs within normal limits with exception of possibly reduced DLCO (likely artifact secondary to anemia).  Faint wheeze on exam.  Has previously had great response to steroid or doxycycline with return of symptoms after completion of these therapies.  At this time we will try a longer course of steroid combined with an antibiotic.  If this does not resolve her cough, she may benefit from an inhaled steroid.  -Prednisone 20 mg daily for 10 days  -Doxycycline 100 mg twice daily for 10 days  -Continue speech therapy as previously arranged      ## Pulmonary nodules  Multiple incidental identified subpleural nodules, largest which measures 8 mm.  -Referral to pulmonary nodule clinic for longitudinal follow-up      Return to clinic: 2 months    I personally spent 45  minutes on the date of the encounter doing chart review, history and exam, documentation and further activities per the note.    Chad Blair M.D.  Pulmonary & Critical Care  Pager: Click Here to page      The above note was dictated using voice recognition software and may include typographical errors. Please contact the author for any clarifications.

## 2022-01-24 NOTE — NURSING NOTE
Chief Complaint   Patient presents with     General Visit     new pt     Vitals were taken and medications were reconciled.     CHIQUIS Whaley

## 2022-01-25 DIAGNOSIS — R91.8 PULMONARY NODULES: Primary | ICD-10-CM

## 2022-01-25 LAB
DLCOUNC-%PRED-PRE: 66 %
DLCOUNC-PRE: 13.24 ML/MIN/MMHG
DLCOUNC-PRED: 19.83 ML/MIN/MMHG
ERV-%PRED-PRE: 49 %
ERV-PRE: 0.25 L
ERV-PRED: 0.5 L
EXPTIME-PRE: 8.18 SEC
FEF2575-%PRED-PRE: 71 %
FEF2575-PRE: 1.08 L/SEC
FEF2575-PRED: 1.51 L/SEC
FEFMAX-%PRED-PRE: 94 %
FEFMAX-PRE: 4.45 L/SEC
FEFMAX-PRED: 4.71 L/SEC
FEV1-%PRED-PRE: 83 %
FEV1-PRE: 1.63 L
FEV1FEV6-PRE: 71 %
FEV1FEV6-PRED: 77 %
FEV1FVC-PRE: 70 %
FEV1FVC-PRED: 76 %
FEV1SVC-PRE: 66 %
FEV1SVC-PRED: 65 %
FIFMAX-PRE: 3.81 L/SEC
FRCPLETH-%PRED-PRE: 93 %
FRCPLETH-PRE: 2.64 L
FRCPLETH-PRED: 2.84 L
FVC-%PRED-PRE: 89 %
FVC-PRE: 2.32 L
FVC-PRED: 2.61 L
IC-%PRED-PRE: 87 %
IC-PRE: 2.21 L
IC-PRED: 2.52 L
RVPLETH-%PRED-PRE: 100 %
RVPLETH-PRE: 2.39 L
RVPLETH-PRED: 2.38 L
TLCPLETH-%PRED-PRE: 92 %
TLCPLETH-PRE: 4.86 L
TLCPLETH-PRED: 5.27 L
VA-%PRED-PRE: 86 %
VA-PRE: 4.32 L
VC-%PRED-PRE: 81 %
VC-PRE: 2.46 L
VC-PRED: 3.02 L

## 2022-01-27 NOTE — TELEPHONE ENCOUNTER
Clopidogrel Bisulfate 75 MG Oral Tablet   Last Written Prescription Date:  1/22/2021  Last Fill Quantity: 90,   # refills: 3  Last Office Visit :  11/23/2021  Future Office visit:  None    Routing refill request to provider for review/approval because:  Abnormal Labs  Refer to clinic for review     Recent Labs   Lab Test 10/01/21  1019   HGB 10.9*           Recent Labs   Lab Test 10/01/21  1019   PLT 97*       Uma Pool RN  Central Triage Red Flags/Med Refills

## 2022-01-31 RX ORDER — CLOPIDOGREL BISULFATE 75 MG/1
75 TABLET ORAL DAILY
Qty: 90 TABLET | Refills: 3 | Status: SHIPPED | OUTPATIENT
Start: 2022-01-31 | End: 2023-02-10

## 2022-01-31 NOTE — TELEPHONE ENCOUNTER
Hgb 12.1 on 11/03/21, though platelets still low at 96.    Will send to provider for review.    Marian Mascorro RN ([facility])

## 2022-02-07 ENCOUNTER — ANCILLARY PROCEDURE (OUTPATIENT)
Dept: CT IMAGING | Facility: CLINIC | Age: 87
End: 2022-02-07
Attending: CLINICAL NURSE SPECIALIST
Payer: COMMERCIAL

## 2022-02-07 DIAGNOSIS — R91.8 PULMONARY NODULES: ICD-10-CM

## 2022-02-07 PROCEDURE — 71250 CT THORAX DX C-: CPT | Performed by: RADIOLOGY

## 2022-02-15 DIAGNOSIS — R07.9 CHEST PAIN, UNSPECIFIED TYPE: ICD-10-CM

## 2022-02-16 ENCOUNTER — OFFICE VISIT (OUTPATIENT)
Dept: PULMONOLOGY | Facility: CLINIC | Age: 87
End: 2022-02-16
Attending: INTERNAL MEDICINE
Payer: COMMERCIAL

## 2022-02-16 ENCOUNTER — ANCILLARY PROCEDURE (OUTPATIENT)
Dept: CARDIOLOGY | Facility: CLINIC | Age: 87
End: 2022-02-16
Attending: INTERNAL MEDICINE
Payer: COMMERCIAL

## 2022-02-16 VITALS
HEART RATE: 82 BPM | HEIGHT: 67 IN | BODY MASS INDEX: 26.68 KG/M2 | DIASTOLIC BLOOD PRESSURE: 91 MMHG | SYSTOLIC BLOOD PRESSURE: 203 MMHG | TEMPERATURE: 98.2 F | WEIGHT: 170 LBS | OXYGEN SATURATION: 99 %

## 2022-02-16 DIAGNOSIS — I49.5 SICK SINUS SYNDROME (H): ICD-10-CM

## 2022-02-16 DIAGNOSIS — R91.1 LUNG NODULE: ICD-10-CM

## 2022-02-16 PROCEDURE — 93294 REM INTERROG EVL PM/LDLS PM: CPT | Performed by: INTERNAL MEDICINE

## 2022-02-16 PROCEDURE — 99203 OFFICE O/P NEW LOW 30 MIN: CPT | Performed by: INTERNAL MEDICINE

## 2022-02-16 PROCEDURE — 93296 REM INTERROG EVL PM/IDS: CPT

## 2022-02-16 ASSESSMENT — PAIN SCALES - GENERAL: PAINLEVEL: NO PAIN (0)

## 2022-02-16 NOTE — PROGRESS NOTES
Cough some improement w steroid and doxy now it is back  RLL pleural based nodule same as sept ct 8 mm GGO, was there in 2014 CT but smaller (abd CT, 3 mm cuters)    1 year f/uwill need inhaled steroids as planed by [facility]  Interventional Pulmonary Clinic Virtual Visit Note    February 16, 2022    Chief complaint:  Tessa Mansfield is a 86 year old female seen for   Chief Complaint   Patient presents with     Oncology Clinic Visit     New; Lung Nodule       Reason for clinic visit / Chief complaint:   Indeterminate pulmonary nodules    Assessment and Plan:  # Indeterminate pulmonary nodules, largest measuring 8 mm in the right lower lobe, groundglass nodule, is slightly enlarged when compared to previous CT scan from 2014 (abdominal CT with different cuts).  Overall risk of this nodule for malignancy is moderate in my opinion.  We will repeat CT chest in 1 year which she is in agreement.    # Chronic cough seen by Dr. Blair recently and trial of prednisone (20 mg 10 days) and doxycycline helped for short period of time but her cough is back.  She has no GERD and using her omeprazole on a daily basis.  Denied to have postnasal drainage but his ongoing runny nose.  Inhaled steroids may help as planned by Dr. Blair.    # Former smoker, quit more than 20 years ago.      History of Present Illness:  This is a 86 years old woman accompanied by her  today.  Recently seen in pulmonary clinic and referred to our clinic for further evaluation and follow-up of pulmonary nodules.  She has no history of exposure to chemicals, radiation or asbestos.  She has no known pulmonary problems in the past.  She has been diagnosed of COVID infection in February 2021 and few months later she started having cough which has been going on.  Her cough responded to prednisone and antibiotic for a short period of time but it is back.  No dyspnea on exertion.       Allergies   Allergen Reactions     Bactrim  "[Sulfamethoxazole W/Trimethoprim] Other (See Comments)     Patient unable to recall     Biaxin [Clarithromycin]      Chlorthalidone Nausea and Vomiting     Clonidine      Codeine Sulfate Itching     Darvon [Propoxyphene Hcl]      Dilaudid [Hydromorphone] Visual Disturbance     Hallucinations       Gabapentin Other (See Comments) and Fatigue     \"felt drunk\"     Indocin      Levaquin [Levofloxacin Hemihydrate]      Lyrica Fatigue     Morphine Sulfate      Percocet [Oxycodone-Acetaminophen] Other (See Comments)     hallucinations     Pregabalin Itching     Other reaction(s): Fatigue     Shrimp Swelling     Spironolactone Other (See Comments)     Dehydrated       Terazosin      Ciprofloxacin Rash     Simvastatin Palpitations     Muscle weakness, leg cramping          Past Medical History:   Diagnosis Date     CAD (coronary artery disease)      CAD s/p PCI+BMS to MRCA 10/2002, PCI to mLCX 2/2003, PCI+DESx2 to pLAD 11/2007, PCI+DESx1 to dRCA 12/2007, PCI+ALVAREZ to RPDA 7/2013; on indefinite DAPT  10/27/2002    10/27/2002: AMI s/p PCI+BMS (3.5x18mm Bx velocity) to mRCA 2/5/2003: Back pain; PCI+stent to mLCX c/b distal embolization/slow flow 4/11/2003: Unstable angina; PCI+stent (Hepacoat velocity) to mLAD 11/27/2007: PCI+DESx2 to pLAD (IVUS w/ calcification of LMCA and ulcerated plaque pLAD, 80% dRCA; also had 80% dLCX, too small to stent) 12/11/2007: Staged PCI. PCI+DESx1 (3.5x13mm Cypher) to dRCA (indefinite DAPT recommended at this time) 6/27/2008: Angina. mLCX stent 70% ISR. LAD and RCA stents patent. Myocardium at risk from LCX felt to be small, medical management preferred to PCI. 11/10/2009: Angina. Findings unchanged from 6/27/2008, FFR LAD 0.90. Medical management recommended. 7/23/2013: Unstable angina; PCI+ALVAREZ to mPDA. Diagnostic findings: LMCA 40% distal. LAD: pLAD stents patent mLAD 30% ISR dLAD 50% diffuse, D2 diffuse disease. LCX 80% mid ISR. RCA diffuse <30% mid, RPLAs diffuse disease, RPDA 100% occlusion.   "     Cardiac Pacemaker- Medtronic, dual chamber- NOT dependant 11/28/2007     CKD (chronic kidney disease), stage IV (H)      Hyperlipidemia LDL goal <70      Hypertension      Stented coronary artery 10-    RCA     Stented coronary artery 2-5-2003    LCx     Stented coronary artery 4-    LAD     Stented coronary artery 11-    LAD     Stented coronary artery 12-    RCA     Transient complete heart block (H) 11/28/2007        Past Surgical History:   Procedure Laterality Date     CHOLECYSTECTOMY       EP PACEMAKER N/A 10/15/2019    Procedure: EP PACEMAKER;  Surgeon: Anthony Zhu MD;  Location:  [facility]ATH LAB     HC PPM INSERTION NEW/REPLACEMENT W/ ATRIAL&VENTRICULAR LEAD  11-     HYSTERECTOMY          Social History     Socioeconomic History     Marital status:      Spouse name: Not on file     Number of children: 4     Years of education: Not on file     Highest education level: Not on file   Occupational History     Employer: [employer]   Tobacco Use     Smoking status: Former Smoker     Packs/day: 0.30     Years: 15.00     Pack years: 4.50     Types: Cigarettes     Smokeless tobacco: Never Used     Tobacco comment: Quit 22+ years ago   Substance and Sexual Activity     Alcohol use: Not on file     Drug use: No     Sexual activity: Not Currently     Partners: Male     Birth control/protection: Post-menopausal   Other Topics Concern      Service Not Asked     Blood Transfusions Yes     Caffeine Concern Not Asked     Occupational Exposure Not Asked     Hobby Hazards Not Asked     Sleep Concern Not Asked     Stress Concern Not Asked     Weight Concern Not Asked     Special Diet Not Asked     Back Care Not Asked     Exercise No     Bike Helmet Not Asked     Seat Belt Not Asked     Self-Exams Not Asked     Parent/sibling w/ CABG, MI or angioplasty before 65F 55M? Not Asked   Social History Narrative     Not on file     Social Determinants  of Health     Financial Resource Strain: Not on file   Food Insecurity: Not on file   Transportation Needs: Not on file   Physical Activity: Not on file   Stress: Not on file   Social Connections: Not on file   Intimate Partner Violence: Not on file   Housing Stability: Not on file        Family History   Problem Relation Age of Onset     Cancer Sister 82        bladder cancer        Immunization History   Administered Date(s) Administered     COVID-19,PF,Moderna 02/13/2021, 03/13/2021     COVID-19,PF,Moderna Booster 11/23/2021     Flu 65+ Years 10/04/2018, 10/18/2019     Influenza (H1N1) 02/09/2010     Influenza (High Dose) 3 valent vaccine 12/18/2015, 09/28/2016, 10/11/2017     Influenza (IIV3) PF 10/29/2004, 11/02/2005, 10/24/2006, 10/31/2007, 12/19/2008, 10/16/2010, 09/19/2012, 12/30/2014     Pneumo Conj 13-V (2010&after) 12/30/2014     Pneumococcal 23 valent 12/06/2002, 09/19/2012     TD (ADULT, 7+) 12/06/2002, 02/13/2004     TDAP Vaccine (Boostrix) 09/11/2012     Tdap (Adacel,Boostrix) 02/21/2013     Zoster vaccine, live 02/18/2011       Current Outpatient Medications   Medication Sig     allopurinol (ZYLOPRIM) 100 MG tablet Take 1 tablet (100 mg) by mouth daily     aspirin 81 MG tablet Take 1 tablet by mouth daily.     clopidogrel (PLAVIX) 75 MG tablet Take 1 tablet (75 mg) by mouth daily     cyanocolbalamin (VITAMIN B-12) 1000 MCG tablet Take 500 mcg by mouth daily As directed     doxycycline hyclate (VIBRAMYCIN) 100 MG capsule Take 1 capsule (100 mg) by mouth 2 times daily     furosemide (LASIX) 40 MG tablet Take 1 tablet (40 mg) by mouth 2 times daily     isosorbide mononitrate (ISMO/MONOKET) 20 MG tablet Take 3 tablets (60 mg) by mouth 2 times daily     metoprolol tartrate (LOPRESSOR) 100 MG tablet Take 1 tablet (100 mg) by mouth 2 times daily     Multiple Vitamins-Minerals (PRESERVISION AREDS 2+MULTI VIT PO) Take by mouth 2 times daily     omeprazole (PRILOSEC) 40 MG DR capsule Take 1 capsule (40 mg) by  "mouth daily Take 1 capsule by mouth once daily     potassium chloride ER (K-TAB/KLOR-CON) 10 MEQ CR tablet TAKE 2 TABLETS BY MOUTH IN THE MORNING AND 1 TABLET IN THE EVENING     pravastatin (PRAVACHOL) 80 MG tablet Take 1 tablet (80 mg) by mouth daily     timolol maleate (ISTALOL) 0.5 % opthalmic solution INSTILL 1 INTO EACH EYE ONCE DAILY IN THE MORNING     VITAMIN D3 25 MCG (1000 UT) tablet TAKE 1 TABLET BY MOUTH ONCE DAILY**DUE TO BE SEEN FOR FURTHER REFILLS**     methylPREDNISolone (MEDROL DOSEPAK) 4 MG tablet therapy pack Follow Package Directions (Patient not taking: Reported on 2/16/2022)     methylPREDNISolone (MEDROL DOSEPAK) 4 MG tablet therapy pack Follow Package Directions (Patient not taking: Reported on 2/16/2022)     timolol maleate (TIMOPTIC) 0.5 % ophthalmic solution INSTILL 1 DROP INTO EACH EYE ONCE DAILY IN THE MORNING (Patient not taking: Reported on 2/16/2022)     No current facility-administered medications for this visit.        Review of Systems:  I have done 10 points of review systems and all negative except for those mentioned in HPI    Physical examination  Constitutional: Oriented, not in distress  BP (!) 203/91   Pulse 82   Temp 98.2  F (36.8  C) (Oral)   Ht 1.702 m (5' 7\")   Wt 77.1 kg (170 lb)   SpO2 99%   BMI 26.63 kg/m    Eyes: No icterus, nystagmus, pupils isocoric  Head and neck: normal posture and movements  Respiratory: Normal tidal breathing, no shortness of breath, no audible wheezing or stridor   Musculoskeletal: Normal muscle mass, no deformity on hands/fingers  Integumentary:  No rash on visible skin areas  Neurological: Alert, orientedx3, no motor deficits  Psychiatric:  Mood and affect are appropriate with insight into his/her medical condition    Data:  Lab Results   Component Value Date    WBC 7.0 10/01/2021    WBC 5.4 08/26/2020     Lab Results   Component Value Date    RBC 3.72 10/01/2021    RBC 3.58 08/26/2020     Lab Results   Component Value Date    HGB 10.9 " 10/01/2021    HGB 11.2 08/26/2020     Lab Results   Component Value Date    HCT 35.8 10/01/2021    HCT 36.0 08/26/2020     Lab Results   Component Value Date    MCV 96 10/01/2021     08/26/2020     Lab Results   Component Value Date    MCH 29.3 10/01/2021    MCH 31.3 08/26/2020     Lab Results   Component Value Date    MCHC 30.4 10/01/2021    MCHC 31.1 08/26/2020     Lab Results   Component Value Date    RDW 14.4 10/01/2021    RDW 14.9 08/26/2020     Lab Results   Component Value Date    PLT 97 10/01/2021     08/26/2020       Lab Results   Component Value Date     09/13/2021     08/26/2020      Lab Results   Component Value Date    POTASSIUM 4.4 09/13/2021    POTASSIUM 4.4 08/26/2020     Lab Results   Component Value Date    CHLORIDE 111 09/13/2021    CHLORIDE 111 08/26/2020     Lab Results   Component Value Date    ALEXANDRO 9.0 09/13/2021    ALEXANDRO 9.0 08/26/2020     Lab Results   Component Value Date    CO2 26 09/13/2021    CO2 24 08/26/2020     Lab Results   Component Value Date    BUN 38 09/13/2021    BUN 41 08/26/2020     Lab Results   Component Value Date    CR 1.81 09/13/2021    CR 1.70 08/26/2020     Lab Results   Component Value Date    GLC 83 09/13/2021    GLC 84 08/26/2020         HAVEN Singh MD

## 2022-02-16 NOTE — LETTER
2/16/2022       RE: Tessa Mansfield  [address]  [facility] [address]     Dear Colleague,    Thank you for referring your patient, Tessa Mansfield[facility]. Please see a copy of my visit note below.    Cough some improement w steroid and doxy now it is back  RLL pleural based nodule same as sept ct 8 mm GGO, was there in 2014 CT but smaller (abd CT, 3 mm cuters)    1 year f/uwill need inhaled steroids as planed by [facility]  Interventional Pulmonary Clinic Virtual Visit Note    February 16, 2022    Chief complaint:  Tessa Mansfield is a 86 year old female seen for   Chief Complaint   Patient presents with     Oncology Clinic Visit     New; Lung Nodule       Reason for clinic visit / Chief complaint:   Indeterminate pulmonary nodules    Assessment and Plan:  # Indeterminate pulmonary nodules, largest measuring 8 mm in the right lower lobe, groundglass nodule, is slightly enlarged when compared to previous CT scan from 2014 (abdominal CT with different cuts).  Overall risk of this nodule for malignancy is moderate in my opinion.  We will repeat CT chest in 1 year which she is in agreement.    # Chronic cough seen by Dr. Blair recently and trial of prednisone (20 mg 10 days) and doxycycline helped for short period of time but her cough is back.  She has no GERD and using her omeprazole on a daily basis.  Denied to have postnasal drainage but his ongoing runny nose.  Inhaled steroids may help as planned by Dr. Blair.    # Former smoker, quit more than 20 years ago.      History of Present Illness:  This is a 86 years old woman accompanied by her  today.  Recently seen in pulmonary clinic and referred to our clinic for further evaluation and follow-up of pulmonary nodules.  She has no history of exposure to chemicals, radiation or asbestos.  She has no known pulmonary problems  "in the past.  She has been diagnosed of COVID infection in February 2021 and few months later she started having cough which has been going on.  Her cough responded to prednisone and antibiotic for a short period of time but it is back.  No dyspnea on exertion.       Allergies   Allergen Reactions     Bactrim [Sulfamethoxazole W/Trimethoprim] Other (See Comments)     Patient unable to recall     Biaxin [Clarithromycin]      Chlorthalidone Nausea and Vomiting     Clonidine      Codeine Sulfate Itching     Darvon [Propoxyphene Hcl]      Dilaudid [Hydromorphone] Visual Disturbance     Hallucinations       Gabapentin Other (See Comments) and Fatigue     \"felt drunk\"     Indocin      Levaquin [Levofloxacin Hemihydrate]      Lyrica Fatigue     Morphine Sulfate      Percocet [Oxycodone-Acetaminophen] Other (See Comments)     hallucinations     Pregabalin Itching     Other reaction(s): Fatigue     Shrimp Swelling     Spironolactone Other (See Comments)     Dehydrated       Terazosin      Ciprofloxacin Rash     Simvastatin Palpitations     Muscle weakness, leg cramping          Past Medical History:   Diagnosis Date     CAD (coronary artery disease)      CAD s/p PCI+BMS to MRCA 10/2002, PCI to mLCX 2/2003, PCI+DESx2 to pLAD 11/2007, PCI+DESx1 to dRCA 12/2007, PCI+ALVAREZ to RPDA 7/2013; on indefinite DAPT  10/27/2002    10/27/2002: AMI s/p PCI+BMS (3.5x18mm Bx velocity) to mRCA 2/5/2003: Back pain; PCI+stent to mLCX c/b distal embolization/slow flow 4/11/2003: Unstable angina; PCI+stent (Hepacoat velocity) to mLAD 11/27/2007: PCI+DESx2 to pLAD (IVUS w/ calcification of LMCA and ulcerated plaque pLAD, 80% dRCA; also had 80% dLCX, too small to stent) 12/11/2007: Staged PCI. PCI+DESx1 (3.5x13mm Cypher) to dRCA (indefinite DAPT recommended at this time) 6/27/2008: Angina. mLCX stent 70% ISR. LAD and RCA stents patent. Myocardium at risk from LCX felt to be small, medical management preferred to PCI. 11/10/2009: Angina. Findings " unchanged from 6/27/2008, FFR LAD 0.90. Medical management recommended. 7/23/2013: Unstable angina; PCI+ALVAREZ to mPDA. Diagnostic findings: LMCA 40% distal. LAD: pLAD stents patent mLAD 30% ISR dLAD 50% diffuse, D2 diffuse disease. LCX 80% mid ISR. RCA diffuse <30% mid, RPLAs diffuse disease, RPDA 100% occlusion.       Cardiac Pacemaker- Medtronic, dual chamber- NOT dependant 11/28/2007     CKD (chronic kidney disease), stage IV (H)      Hyperlipidemia LDL goal <70      Hypertension      Stented coronary artery 10-    RCA     Stented coronary artery 2-5-2003    LCx     Stented coronary artery 4-    LAD     Stented coronary artery 11-    LAD     Stented coronary artery 12-    RCA     Transient complete heart block (H) 11/28/2007        Past Surgical History:   Procedure Laterality Date     CHOLECYSTECTOMY       EP PACEMAKER N/A 10/15/2019    Procedure: EP PACEMAKER;  Surgeon: Anthony Zhu MD;  Location:  HEART CARDI[facility] CATH LAB     HC PPM INSERTION NEW/REPLACEMENT W/ ATRIAL&VENTRICULAR LEAD  11-     HYSTERECTOMY          Social History     Socioeconomic History     Marital status:      Spouse name: Not on file     Number of children: 4     Years of education: Not on file     Highest education level: Not on file   Occupational History     Employer: [employer]   Tobacco Use     Smoking status: Former Smoker     Packs/day: 0.30     Years: 15.00     Pack years: 4.50     Types: Cigarettes     Smokeless tobacco: Never Used     Tobacco comment: Quit 22+ years ago   Substance and Sexual Activity     Alcohol use: Not on file     Drug use: No     Sexual activity: Not Currently     Partners: Male     Birth control/protection: Post-menopausal   Other Topics Concern      Service Not Asked     Blood Transfusions Yes     Caffeine Concern Not Asked     Occupational Exposure Not Asked     Hobby Hazards Not Asked     Sleep Concern Not Asked     Stress Concern Not  Asked     Weight Concern Not Asked     Special Diet Not Asked     Back Care Not Asked     Exercise No     Bike Helmet Not Asked     Seat Belt Not Asked     Self-Exams Not Asked     Parent/sibling w/ CABG, MI or angioplasty before 65F 55M? Not Asked   Social History Narrative     Not on file     Social Determinants of Health     Financial Resource Strain: Not on file   Food Insecurity: Not on file   Transportation Needs: Not on file   Physical Activity: Not on file   Stress: Not on file   Social Connections: Not on file   Intimate Partner Violence: Not on file   Housing Stability: Not on file        Family History   Problem Relation Age of Onset     Cancer Sister 82        bladder cancer        Immunization History   Administered Date(s) Administered     COVID-19,PF,Moderna 02/13/2021, 03/13/2021     COVID-19,PF,Moderna Booster 11/23/2021     Flu 65+ Years 10/04/2018, 10/18/2019     Influenza (H1N1) 02/09/2010     Influenza (High Dose) 3 valent vaccine 12/18/2015, 09/28/2016, 10/11/2017     Influenza (IIV3) PF 10/29/2004, 11/02/2005, 10/24/2006, 10/31/2007, 12/19/2008, 10/16/2010, 09/19/2012, 12/30/2014     Pneumo Conj 13-V (2010&after) 12/30/2014     Pneumococcal 23 valent 12/06/2002, 09/19/2012     TD (ADULT, 7+) 12/06/2002, 02/13/2004     TDAP Vaccine (Boostrix) 09/11/2012     Tdap (Adacel,Boostrix) 02/21/2013     Zoster vaccine, live 02/18/2011       Current Outpatient Medications   Medication Sig     allopurinol (ZYLOPRIM) 100 MG tablet Take 1 tablet (100 mg) by mouth daily     aspirin 81 MG tablet Take 1 tablet by mouth daily.     clopidogrel (PLAVIX) 75 MG tablet Take 1 tablet (75 mg) by mouth daily     cyanocolbalamin (VITAMIN B-12) 1000 MCG tablet Take 500 mcg by mouth daily As directed     doxycycline hyclate (VIBRAMYCIN) 100 MG capsule Take 1 capsule (100 mg) by mouth 2 times daily     furosemide (LASIX) 40 MG tablet Take 1 tablet (40 mg) by mouth 2 times daily     isosorbide mononitrate (ISMO/MONOKET) 20  "MG tablet Take 3 tablets (60 mg) by mouth 2 times daily     metoprolol tartrate (LOPRESSOR) 100 MG tablet Take 1 tablet (100 mg) by mouth 2 times daily     Multiple Vitamins-Minerals (PRESERVISION AREDS 2+MULTI VIT PO) Take by mouth 2 times daily     omeprazole (PRILOSEC) 40 MG DR capsule Take 1 capsule (40 mg) by mouth daily Take 1 capsule by mouth once daily     potassium chloride ER (K-TAB/KLOR-CON) 10 MEQ CR tablet TAKE 2 TABLETS BY MOUTH IN THE MORNING AND 1 TABLET IN THE EVENING     pravastatin (PRAVACHOL) 80 MG tablet Take 1 tablet (80 mg) by mouth daily     timolol maleate (ISTALOL) 0.5 % opthalmic solution INSTILL 1 INTO EACH EYE ONCE DAILY IN THE MORNING     VITAMIN D3 25 MCG (1000 UT) tablet TAKE 1 TABLET BY MOUTH ONCE DAILY**DUE TO BE SEEN FOR FURTHER REFILLS**     methylPREDNISolone (MEDROL DOSEPAK) 4 MG tablet therapy pack Follow Package Directions (Patient not taking: Reported on 2/16/2022)     methylPREDNISolone (MEDROL DOSEPAK) 4 MG tablet therapy pack Follow Package Directions (Patient not taking: Reported on 2/16/2022)     timolol maleate (TIMOPTIC) 0.5 % ophthalmic solution INSTILL 1 DROP INTO EACH EYE ONCE DAILY IN THE MORNING (Patient not taking: Reported on 2/16/2022)     No current facility-administered medications for this visit.        Review of Systems:  I have done 10 points of review systems and all negative except for those mentioned in HPI    Physical examination  Constitutional: Oriented, not in distress  BP (!) 203/91   Pulse 82   Temp 98.2  F (36.8  C) (Oral)   Ht 1.702 m (5' 7\")   Wt 77.1 kg (170 lb)   SpO2 99%   BMI 26.63 kg/m    Eyes: No icterus, nystagmus, pupils isocoric  Head and neck: normal posture and movements  Respiratory: Normal tidal breathing, no shortness of breath, no audible wheezing or stridor   Musculoskeletal: Normal muscle mass, no deformity on hands/fingers  Integumentary:  No rash on visible skin areas  Neurological: Alert, orientedx3, no motor " deficits  Psychiatric:  Mood and affect are appropriate with insight into his/her medical condition    Data:  Lab Results   Component Value Date    WBC 7.0 10/01/2021    WBC 5.4 08/26/2020     Lab Results   Component Value Date    RBC 3.72 10/01/2021    RBC 3.58 08/26/2020     Lab Results   Component Value Date    HGB 10.9 10/01/2021    HGB 11.2 08/26/2020     Lab Results   Component Value Date    HCT 35.8 10/01/2021    HCT 36.0 08/26/2020     Lab Results   Component Value Date    MCV 96 10/01/2021     08/26/2020     Lab Results   Component Value Date    MCH 29.3 10/01/2021    MCH 31.3 08/26/2020     Lab Results   Component Value Date    MCHC 30.4 10/01/2021    MCHC 31.1 08/26/2020     Lab Results   Component Value Date    RDW 14.4 10/01/2021    RDW 14.9 08/26/2020     Lab Results   Component Value Date    PLT 97 10/01/2021     08/26/2020       Lab Results   Component Value Date     09/13/2021     08/26/2020      Lab Results   Component Value Date    POTASSIUM 4.4 09/13/2021    POTASSIUM 4.4 08/26/2020     Lab Results   Component Value Date    CHLORIDE 111 09/13/2021    CHLORIDE 111 08/26/2020     Lab Results   Component Value Date    ALEXANDRO 9.0 09/13/2021    ALEXANDRO 9.0 08/26/2020     Lab Results   Component Value Date    CO2 26 09/13/2021    CO2 24 08/26/2020     Lab Results   Component Value Date    BUN 38 09/13/2021    BUN 41 08/26/2020     Lab Results   Component Value Date    CR 1.81 09/13/2021    CR 1.70 08/26/2020     Lab Results   Component Value Date    GLC 83 09/13/2021    GLC 84 08/26/2020         HAVEN Singh MD

## 2022-02-17 NOTE — TELEPHONE ENCOUNTER
Isosorbide Mononitrate 20 MG Oral Tablet  Last Written Prescription Date:  5/11/2021  Last Fill Quantity: 540,   # refills: 1  Last Office Visit :  11/23/2021  Future Office visit:  None    Routing refill request to provider for review/approval because:  Abnormal B/P   Change med?  Change dose?  Add med? Follow up B/P check?  Return to clinic for review   Also, confirm how many tabs total per day.  Is it 2 tabs or 3 tabs twice a day?   Please advise   Order in chart and order from pharm do not match.   BP Readings from Last 3 Encounters:   02/16/22 (!) 203/91   01/24/22 (!) 179/92   11/23/21 (!) 170/92         Uma Pool RN  Central Triage Red Flags/Med Refills

## 2022-02-18 RX ORDER — ISOSORBIDE MONONITRATE 20 MG/1
TABLET ORAL
Qty: 540 TABLET | Refills: 3 | Status: SHIPPED | OUTPATIENT
Start: 2022-02-18 | End: 2022-05-17

## 2022-02-22 DIAGNOSIS — I10 ESSENTIAL HYPERTENSION: ICD-10-CM

## 2022-02-22 DIAGNOSIS — N18.4 CHRONIC KIDNEY DISEASE, STAGE IV (SEVERE) (H): ICD-10-CM

## 2022-02-22 DIAGNOSIS — N18.4 CKD (CHRONIC KIDNEY DISEASE) STAGE 4, GFR 15-29 ML/MIN (H): ICD-10-CM

## 2022-02-22 DIAGNOSIS — N25.81 SECONDARY RENAL HYPERPARATHYROIDISM (H): ICD-10-CM

## 2022-02-22 RX ORDER — CHOLECALCIFEROL (VITAMIN D3) 25 MCG
TABLET ORAL
Qty: 30 TABLET | Refills: 0 | Status: SHIPPED | OUTPATIENT
Start: 2022-02-22 | End: 2022-04-01

## 2022-02-22 RX ORDER — FUROSEMIDE 40 MG
TABLET ORAL
Qty: 60 TABLET | Refills: 0 | Status: SHIPPED | OUTPATIENT
Start: 2022-02-22 | End: 2022-04-21

## 2022-03-17 DIAGNOSIS — K21.9 GASTROESOPHAGEAL REFLUX DISEASE WITHOUT ESOPHAGITIS: ICD-10-CM

## 2022-03-21 RX ORDER — OMEPRAZOLE 40 MG/1
CAPSULE, DELAYED RELEASE ORAL
Qty: 90 CAPSULE | Refills: 1 | Status: SHIPPED | OUTPATIENT
Start: 2022-03-21 | End: 2022-09-15

## 2022-03-21 NOTE — TELEPHONE ENCOUNTER
Omeprazole 40 MG Oral Capsule Delayed Release      Last Written Prescription Date:  12-27-21  Last Fill Quantity: 90,   # refills: 0  Last Office Visit : 11-23-21  Future Office visit:  none    Routing refill request to provider for review/approval because:  Failed protocol: Clopidogrel on current med list   diagnosis of Osteoporosis

## 2022-04-01 DIAGNOSIS — N18.4 CKD (CHRONIC KIDNEY DISEASE) STAGE 4, GFR 15-29 ML/MIN (H): ICD-10-CM

## 2022-04-01 DIAGNOSIS — N25.81 SECONDARY RENAL HYPERPARATHYROIDISM (H): ICD-10-CM

## 2022-04-01 RX ORDER — CHOLECALCIFEROL (VITAMIN D3) 25 MCG
TABLET ORAL
Qty: 30 TABLET | Refills: 0 | Status: SHIPPED | OUTPATIENT
Start: 2022-04-01 | End: 2022-04-28

## 2022-04-06 ENCOUNTER — TELEPHONE (OUTPATIENT)
Dept: NEPHROLOGY | Facility: CLINIC | Age: 87
End: 2022-04-06
Payer: COMMERCIAL

## 2022-04-06 NOTE — TELEPHONE ENCOUNTER
[facility] Call Center    Phone Message    May a detailed message be left on voicemail: yes     Reason for Call: Other: Pt called and scheduled a f/u visit for 10/3 - next available in person - did not want virtual - pt is wondering if provider can refill medications before her appointment, please call pt to further discuss, Thanks!     Action Taken: Message routed to:  [facility] ([facility] Neph    Travel Screening: Not Applicable

## 2022-04-13 ENCOUNTER — OFFICE VISIT (OUTPATIENT)
Dept: PULMONOLOGY | Facility: CLINIC | Age: 87
End: 2022-04-13
Attending: INTERNAL MEDICINE
Payer: COMMERCIAL

## 2022-04-13 VITALS
OXYGEN SATURATION: 97 % | WEIGHT: 170 LBS | HEIGHT: 67 IN | DIASTOLIC BLOOD PRESSURE: 83 MMHG | HEART RATE: 74 BPM | SYSTOLIC BLOOD PRESSURE: 200 MMHG | BODY MASS INDEX: 26.68 KG/M2

## 2022-04-13 DIAGNOSIS — R05.3 CHRONIC COUGH: Primary | ICD-10-CM

## 2022-04-13 PROCEDURE — G0463 HOSPITAL OUTPT CLINIC VISIT: HCPCS

## 2022-04-13 PROCEDURE — 99213 OFFICE O/P EST LOW 20 MIN: CPT | Performed by: INTERNAL MEDICINE

## 2022-04-13 ASSESSMENT — ENCOUNTER SYMPTOMS
STRIDOR: 0
SHORTNESS OF BREATH: 1
SINUS PAIN: 0
WHEEZING: 0
SPUTUM PRODUCTION: 1
VOMITING: 0
COUGH: 1
NAUSEA: 0
HEMOPTYSIS: 0
CHILLS: 0
HEARTBURN: 0
FEVER: 0

## 2022-04-13 ASSESSMENT — PAIN SCALES - GENERAL: PAINLEVEL: NO PAIN (0)

## 2022-04-13 NOTE — PROGRESS NOTES
Pulmonology Clinic Follow up Visit       Tessa Mansfield MRN# [id number]   YOB: 1935 Age: 87 year old     Date of Visit: 4/13/2022    Reason for Visit: Follow-up chronic cough          Assessment and Plan:     ## Chronic cough  No identified inciting event, dry cough without dyspnea or wheeze or any other symptoms. Onset a few months after Covid infection, however she did not have any respiratory symptoms with her Covid.  CT scan with a few nodules but no other airspace diseases.  PFTs within normal limits with exception of possibly reduced DLCO (likely artifact secondary to anemia).    Faint wheeze in left lower lobe which resolved with subsequent breaths.  Previously had great response to steroid or doxycycline with return of symptoms after completion of these therapies.  We retried a longer dose after her last visit and again her symptoms quickly resolved, however they returned approximately 3 weeks after finishing her course.    While the etiology remains unclear, her symptoms are clearly steroid responsive suggesting an allergy or cough variant asthma type process.  Therefore we will try steroid inhaler.  -Start Dulera      ## Pulmonary nodules  Multiple incidental identified subpleural nodules, largest which measures 8 mm.  -Following with pulmonary nodule clinic        Return to clinic: 2 months    I personally spent 20 minutes on the date of the encounter doing chart review, history and exam, documentation and further activities per the note.      Chad Blair M.D.  Pulmonary & Critical Care  Pager: Click Here to page          History of Present Illness / Interval History:     Tessa Mansfield is a 87 year old female with H/O coronary artery disease with pacemaker and history of Covid in 2021 who presents to follow-up chronic dry cough present since spring 2021.    Last seen in Jan 24th 2022.    At her last visit she was prescribed a longer course of prednisone.  She reports that this  "completely improved her cough, however it returned 2 or 3 weeks after stopping the prednisone.  Her cough is productive of scant clear sputum in the mornings, otherwise dry.  She has some mild dyspnea with exertion, particularly when climbing up stairs but this has been chronic over the last couple years without change.  She denies wheezing, fevers, weight loss, night sweats.            Review of Systems:     Review of Systems   Constitutional: Negative for chills and fever.   HENT: Negative for congestion and sinus pain.    Respiratory: Positive for cough, sputum production and shortness of breath. Negative for hemoptysis, wheezing and stridor.    Gastrointestinal: Negative for heartburn, nausea and vomiting.   Skin: Negative.             Physical Examination:     BP (!) 200/83 (BP Location: Right arm, Patient Position: Chair, Cuff Size: Adult Regular)   Pulse 74   Ht 1.702 m (5' 7\")   Wt 77.1 kg (170 lb)   SpO2 97%   BMI 26.63 kg/m      Physical Exam  Vitals and nursing note reviewed.   Constitutional:       Appearance: She is obese.   HENT:      Head: Normocephalic and atraumatic.   Eyes:      Extraocular Movements: Extraocular movements intact.      Conjunctiva/sclera: Conjunctivae normal.      Pupils: Pupils are equal, round, and reactive to light.   Cardiovascular:      Rate and Rhythm: Normal rate and regular rhythm.      Heart sounds: No murmur heard.  Pulmonary:      Effort: Pulmonary effort is normal.      Breath sounds: Normal breath sounds. No rhonchi or rales.      Comments: Faint wheeze in the left lower field on 1 breath, but cleared on subsequent breaths.  Musculoskeletal:      Right lower leg: No edema.      Left lower leg: No edema.   Skin:     General: Skin is warm and dry.   Neurological:      General: No focal deficit present.      Mental Status: She is alert and oriented to person, place, and time.       Fingernails without clubbing        Data:     No new data since last seen        " Medications:     Current Outpatient Medications   Medication     allopurinol (ZYLOPRIM) 100 MG tablet     aspirin 81 MG tablet     clopidogrel (PLAVIX) 75 MG tablet     cyanocolbalamin (VITAMIN B-12) 1000 MCG tablet     doxycycline hyclate (VIBRAMYCIN) 100 MG capsule     furosemide (LASIX) 40 MG tablet     isosorbide mononitrate (ISMO/MONOKET) 20 MG tablet     methylPREDNISolone (MEDROL DOSEPAK) 4 MG tablet therapy pack     methylPREDNISolone (MEDROL DOSEPAK) 4 MG tablet therapy pack     metoprolol tartrate (LOPRESSOR) 100 MG tablet     Multiple Vitamins-Minerals (PRESERVISION AREDS 2+MULTI VIT PO)     omeprazole (PRILOSEC) 40 MG DR capsule     potassium chloride ER (K-TAB/KLOR-CON) 10 MEQ CR tablet     pravastatin (PRAVACHOL) 80 MG tablet     timolol maleate (ISTALOL) 0.5 % opthalmic solution     timolol maleate (TIMOPTIC) 0.5 % ophthalmic solution     VITAMIN D3 25 MCG (1000 UT) tablet     No current facility-administered medications for this visit.             The above note was dictated using voice recognition software and may include typographical errors. Please contact the author for any clarifications.

## 2022-04-13 NOTE — LETTER
4/13/2022         RE: Tessa Mansfield  [address]  [facility] [address]        Dear Colleague,    Thank you for referring your patient, Tessa Mansfield, to the [facility] [place]. Please see a copy of my visit note below.    Pulmonology Clinic Follow up Visit       Tessa Mansfield MRN# [id number]   YOB: 1935 Age: 87 year old     Date of Visit: 4/13/2022    Reason for Visit: Follow-up chronic cough          Assessment and Plan:     ## Chronic cough  No identified inciting event, dry cough without dyspnea or wheeze or any other symptoms. Onset a few months after Covid infection, however she did not have any respiratory symptoms with her Covid.  CT scan with a few nodules but no other airspace diseases.  PFTs within normal limits with exception of possibly reduced DLCO (likely artifact secondary to anemia).    Faint wheeze in left lower lobe which resolved with subsequent breaths.  Previously had great response to steroid or doxycycline with return of symptoms after completion of these therapies.  We retried a longer dose after her last visit and again her symptoms quickly resolved, however they returned approximately 3 weeks after finishing her course.    While the etiology remains unclear, her symptoms are clearly steroid responsive suggesting an allergy or cough variant asthma type process.  Therefore we will try steroid inhaler.  -Start Dulera      ## Pulmonary nodules  Multiple incidental identified subpleural nodules, largest which measures 8 mm.  -Following with pulmonary nodule clinic        Return to clinic: 2 months    I personally spent 20 minutes on the date of the encounter doing chart review, history and exam, documentation and further activities per the note.      Chad Blair M.D.  Pulmonary & Critical Care  Pager: Click Here to page          History of Present Illness / Interval History:     Tessa Mansfield is a 87  "year old female with H/O coronary artery disease with pacemaker and history of Covid in 2021 who presents to follow-up chronic dry cough present since spring 2021.    Last seen in Jan 24th 2022.    At her last visit she was prescribed a longer course of prednisone.  She reports that this completely improved her cough, however it returned 2 or 3 weeks after stopping the prednisone.  Her cough is productive of scant clear sputum in the mornings, otherwise dry.  She has some mild dyspnea with exertion, particularly when climbing up stairs but this has been chronic over the last couple years without change.  She denies wheezing, fevers, weight loss, night sweats.            Review of Systems:     Review of Systems   Constitutional: Negative for chills and fever.   HENT: Negative for congestion and sinus pain.    Respiratory: Positive for cough, sputum production and shortness of breath. Negative for hemoptysis, wheezing and stridor.    Gastrointestinal: Negative for heartburn, nausea and vomiting.   Skin: Negative.             Physical Examination:     BP (!) 200/83 (BP Location: Right arm, Patient Position: Chair, Cuff Size: Adult Regular)   Pulse 74   Ht 1.702 m (5' 7\")   Wt 77.1 kg (170 lb)   SpO2 97%   BMI 26.63 kg/m      Physical Exam  Vitals and nursing note reviewed.   Constitutional:       Appearance: She is obese.   HENT:      Head: Normocephalic and atraumatic.   Eyes:      Extraocular Movements: Extraocular movements intact.      Conjunctiva/sclera: Conjunctivae normal.      Pupils: Pupils are equal, round, and reactive to light.   Cardiovascular:      Rate and Rhythm: Normal rate and regular rhythm.      Heart sounds: No murmur heard.  Pulmonary:      Effort: Pulmonary effort is normal.      Breath sounds: Normal breath sounds. No rhonchi or rales.      Comments: Faint wheeze in the left lower field on 1 breath, but cleared on subsequent breaths.  Musculoskeletal:      Right lower leg: No edema.      " Left lower leg: No edema.   Skin:     General: Skin is warm and dry.   Neurological:      General: No focal deficit present.      Mental Status: She is alert and oriented to person, place, and time.       Fingernails without clubbing        Data:     No new data since last seen        Medications:     Current Outpatient Medications   Medication     allopurinol (ZYLOPRIM) 100 MG tablet     aspirin 81 MG tablet     clopidogrel (PLAVIX) 75 MG tablet     cyanocolbalamin (VITAMIN B-12) 1000 MCG tablet     doxycycline hyclate (VIBRAMYCIN) 100 MG capsule     furosemide (LASIX) 40 MG tablet     isosorbide mononitrate (ISMO/MONOKET) 20 MG tablet     methylPREDNISolone (MEDROL DOSEPAK) 4 MG tablet therapy pack     methylPREDNISolone (MEDROL DOSEPAK) 4 MG tablet therapy pack     metoprolol tartrate (LOPRESSOR) 100 MG tablet     Multiple Vitamins-Minerals (PRESERVISION AREDS 2+MULTI VIT PO)     omeprazole (PRILOSEC) 40 MG DR capsule     potassium chloride ER (K-TAB/KLOR-CON) 10 MEQ CR tablet     pravastatin (PRAVACHOL) 80 MG tablet     timolol maleate (ISTALOL) 0.5 % opthalmic solution     timolol maleate (TIMOPTIC) 0.5 % ophthalmic solution     VITAMIN D3 25 MCG (1000 UT) tablet     No current facility-administered medications for this visit.       The above note was dictated using voice recognition software and may include typographical errors. Please contact the author for any clarifications.    Again, thank you for allowing me to participate in the care of your patient.        Sincerely,        Chad Blair MD

## 2022-04-13 NOTE — NURSING NOTE
Chief Complaint   Patient presents with     Follow Up     2 month f/u     Vitals were taken and medications were reconciled.     Nay Bustos RMA  12:00 PM

## 2022-04-14 DIAGNOSIS — R05.3 CHRONIC COUGH: Primary | ICD-10-CM

## 2022-04-14 RX ORDER — BUDESONIDE AND FORMOTEROL FUMARATE DIHYDRATE 160; 4.5 UG/1; UG/1
2 AEROSOL RESPIRATORY (INHALATION) 2 TIMES DAILY
Qty: 10.2 G | Refills: 4 | Status: SHIPPED | OUTPATIENT
Start: 2022-04-14 | End: 2022-04-14

## 2022-04-14 RX ORDER — FLUTICASONE PROPIONATE AND SALMETEROL XINAFOATE 230; 21 UG/1; UG/1
2 AEROSOL, METERED RESPIRATORY (INHALATION) 2 TIMES DAILY
Qty: 12 G | Refills: 4 | Status: SHIPPED | OUTPATIENT
Start: 2022-04-14 | End: 2022-04-14

## 2022-04-14 RX ORDER — FLUTICASONE PROPIONATE AND SALMETEROL 250; 50 UG/1; UG/1
1 POWDER RESPIRATORY (INHALATION) 2 TIMES DAILY
Qty: 60 EACH | Refills: 4 | Status: SHIPPED | OUTPATIENT
Start: 2022-04-14 | End: 2023-01-02

## 2022-04-14 NOTE — PROGRESS NOTES
Received FAX from [facility] pharmacy requesting rx for Symbicort as it will be more cost effective for patient compared to Dulera. Dr. Gomez approved the Symbicort.     Called patient to notify her of change. Patient understand the plan.     Put order in for Symbicort. Called pharmacy to let them know the plan. They informed writer that patient's insurance will not cover generic Symbicort. They suggested Advair.    Dr. Gomez approved high dose Advair HFA.     Pharmacy called to confirm plan for high does Advair HFA. They informed this is also not covered in generic form.    Advair diskus 250/50 will work for cost effectiveness for patient. Dr. Blair messaged writer to confirm dosage.     Patient called - confirms she understands the plan.

## 2022-04-20 DIAGNOSIS — I10 ESSENTIAL HYPERTENSION: ICD-10-CM

## 2022-04-20 DIAGNOSIS — E87.6 HYPOKALEMIA: ICD-10-CM

## 2022-04-20 DIAGNOSIS — N18.4 CHRONIC KIDNEY DISEASE, STAGE IV (SEVERE) (H): ICD-10-CM

## 2022-04-21 RX ORDER — FUROSEMIDE 40 MG
TABLET ORAL
Qty: 60 TABLET | Refills: 0 | Status: SHIPPED | OUTPATIENT
Start: 2022-04-21 | End: 2022-06-24

## 2022-04-21 RX ORDER — POTASSIUM CHLORIDE 750 MG/1
TABLET, EXTENDED RELEASE ORAL
Qty: 270 TABLET | Refills: 0 | Status: SHIPPED | OUTPATIENT
Start: 2022-04-21 | End: 2022-07-26

## 2022-04-22 NOTE — TELEPHONE ENCOUNTER
Call to patient. Informed her that potassium and lasix were sent in.  Will route to schedulers to see if there is a sooner appointment than October with Mario Thorne LPN  Nephrology  [phone]

## 2022-04-27 DIAGNOSIS — N18.4 CKD (CHRONIC KIDNEY DISEASE) STAGE 4, GFR 15-29 ML/MIN (H): ICD-10-CM

## 2022-04-27 DIAGNOSIS — N25.81 SECONDARY RENAL HYPERPARATHYROIDISM (H): ICD-10-CM

## 2022-04-28 RX ORDER — CHOLECALCIFEROL (VITAMIN D3) 25 MCG
TABLET ORAL
Qty: 30 TABLET | Refills: 0 | Status: SHIPPED | OUTPATIENT
Start: 2022-04-28 | End: 2022-06-02

## 2022-05-13 ENCOUNTER — OFFICE VISIT (OUTPATIENT)
Dept: FAMILY MEDICINE | Facility: CLINIC | Age: 87
End: 2022-05-13
Payer: COMMERCIAL

## 2022-05-13 ENCOUNTER — ANCILLARY PROCEDURE (OUTPATIENT)
Dept: GENERAL RADIOLOGY | Facility: CLINIC | Age: 87
End: 2022-05-13
Attending: FAMILY MEDICINE
Payer: COMMERCIAL

## 2022-05-13 ENCOUNTER — TELEPHONE (OUTPATIENT)
Dept: INTERNAL MEDICINE | Facility: CLINIC | Age: 87
End: 2022-05-13

## 2022-05-13 VITALS
OXYGEN SATURATION: 95 % | HEART RATE: 83 BPM | WEIGHT: 169.8 LBS | DIASTOLIC BLOOD PRESSURE: 97 MMHG | RESPIRATION RATE: 16 BRPM | BODY MASS INDEX: 26.65 KG/M2 | HEIGHT: 67 IN | SYSTOLIC BLOOD PRESSURE: 174 MMHG

## 2022-05-13 DIAGNOSIS — I10 ESSENTIAL HYPERTENSION: ICD-10-CM

## 2022-05-13 DIAGNOSIS — M89.8X6 TIBIAL MASS: Primary | ICD-10-CM

## 2022-05-13 DIAGNOSIS — M89.8X6 TIBIAL MASS: ICD-10-CM

## 2022-05-13 DIAGNOSIS — R22.42 MASS OF LEFT LOWER EXTREMITY: ICD-10-CM

## 2022-05-13 DIAGNOSIS — I87.2 VENOUS STASIS DERMATITIS OF BOTH LOWER EXTREMITIES: ICD-10-CM

## 2022-05-13 PROCEDURE — 99214 OFFICE O/P EST MOD 30 MIN: CPT | Performed by: FAMILY MEDICINE

## 2022-05-13 PROCEDURE — 73590 X-RAY EXAM OF LOWER LEG: CPT | Mod: LT | Performed by: RADIOLOGY

## 2022-05-13 RX ORDER — AMLODIPINE BESYLATE 2.5 MG/1
2.5 TABLET ORAL DAILY
Qty: 90 TABLET | Refills: 3 | Status: SHIPPED | OUTPATIENT
Start: 2022-05-13 | End: 2022-05-17

## 2022-05-13 ASSESSMENT — PAIN SCALES - GENERAL: PAINLEVEL: NO PAIN (0)

## 2022-05-13 NOTE — PROGRESS NOTES
"    Assessment & Plan     Tibial mass  Nothing on plain film  - XR Tibia & Fibula Left 2 Views; Future    Essential hypertension  Add low dose Nvasc back, get labs  - CBC with platelets differential; Future  - Basic metabolic panel; Future  - amLODIPine (NORVASC) 2.5 MG tablet; Take 1 tablet (2.5 mg) by mouth daily    Mass of left lower extremity  Since neg xray  - US Lower Extremity Non Vascular Left; Future    Venous stasis dermatitis of both lower extremities  Reassured        32 minutes spent on the date of the encounter doing chart review, history and exam, documentation and further activities per the note    BMI:   Estimated body mass index is 26.59 kg/m  as calculated from the following:    Height as of this encounter: 1.702 m (5' 7\").    Weight as of this encounter: 77 kg (169 lb 12.8 oz).     Pedro Gomez MD  [facility] [place]    Lashell Zarate is a 87 year old who presents for the following health issues    HPI Here for a few things  1-chronic mild leg edema, stasis, now brownish skin, no wounds  2-edema not worse  3-BP labile, usually high here and at home she states, sees renal but not for months  4-cough: sees ENT, pulm, still there, only thing that helps is oral pred but comes back when stops it  5-reviewed shots, she'll do covid, [facility]ingirx at [facility]  6-spot on left leg: nodlue by left tibia. No trauma. Not tender or slightly tender. Recent noted.  Past Medical History:   Diagnosis Date     CAD (coronary artery disease)      CAD s/p PCI+BMS to MRCA 10/2002, PCI to mLCX 2/2003, PCI+DESx2 to pLAD 11/2007, PCI+DESx1 to dRCA 12/2007, PCI+ALVAREZ to RPDA 7/2013; on indefinite DAPT  10/27/2002    10/27/2002: AMI s/p PCI+BMS (3.5x18mm Bx velocity) to mRCA 2/5/2003: Back pain; PCI+stent to mLCX c/b distal embolization/slow flow 4/11/2003: Unstable angina; PCI+stent (Hepacoat velocity) to mLAD 11/27/2007: PCI+DESx2 to pLAD (IVUS w/ calcification of LMCA and ulcerated " plaque pLAD, 80% dRCA; also had 80% dLCX, too small to stent) 12/11/2007: Staged PCI. PCI+DESx1 (3.5x13mm Cypher) to dRCA (indefinite DAPT recommended at this time) 6/27/2008: Angina. mLCX stent 70% ISR. LAD and RCA stents patent. Myocardium at risk from LCX felt to be small, medical management preferred to PCI. 11/10/2009: Angina. Findings unchanged from 6/27/2008, FFR LAD 0.90. Medical management recommended. 7/23/2013: Unstable angina; PCI+ALVAREZ to mPDA. Diagnostic findings: LMCA 40% distal. LAD: pLAD stents patent mLAD 30% ISR dLAD 50% diffuse, D2 diffuse disease. LCX 80% mid ISR. RCA diffuse <30% mid, RPLAs diffuse disease, RPDA 100% occlusion.       Cardiac Pacemaker- Medtronic, dual chamber- NOT dependant 11/28/2007     CKD (chronic kidney disease), stage IV (H)      Hyperlipidemia LDL goal <70      Hypertension      Stented coronary artery 10-    RCA     Stented coronary artery 2-5-2003    LCx     Stented coronary artery 4-    LAD     Stented coronary artery 11-    LAD     Stented coronary artery 12-    RCA     Transient complete heart block (H) 11/28/2007     Past Surgical History:   Procedure Laterality Date     CHOLECYSTECTOMY       EP PACEMAKER N/A 10/15/2019    Procedure: EP PACEMAKER;  Surgeon: Anthony Zhu MD;  Location:  HEART CARDIAC CATH LAB     HC PPM INSERTION NEW/REPLACEMENT W/ ATRIAL&VENTRICULAR LEAD  11-     HYSTERECTOMY       Current Outpatient Medications   Medication     allopurinol (ZYLOPRIM) 100 MG tablet     amLODIPine (NORVASC) 2.5 MG tablet     aspirin 81 MG tablet     clopidogrel (PLAVIX) 75 MG tablet     cyanocolbalamin (VITAMIN B-12) 1000 MCG tablet     doxycycline hyclate (VIBRAMYCIN) 100 MG capsule     fluticasone-salmeterol (ADVAIR) 250-50 MCG/DOSE inhaler     furosemide (LASIX) 40 MG tablet     isosorbide mononitrate (ISMO/MONOKET) 20 MG tablet     methylPREDNISolone (MEDROL DOSEPAK) 4 MG tablet therapy pack     methylPREDNISolone  "(MEDROL DOSEPAK) 4 MG tablet therapy pack     metoprolol tartrate (LOPRESSOR) 100 MG tablet     Multiple Vitamins-Minerals (PRESERVISION AREDS 2+MULTI VIT PO)     omeprazole (PRILOSEC) 40 MG DR capsule     potassium chloride ER (K-TAB/KLOR-CON) 10 MEQ CR tablet     pravastatin (PRAVACHOL) 80 MG tablet     timolol maleate (ISTALOL) 0.5 % opthalmic solution     timolol maleate (TIMOPTIC) 0.5 % ophthalmic solution     VITAMIN D3 25 MCG (1000 UT) tablet     No current facility-administered medications for this visit.     Allergies   Allergen Reactions     Bactrim [Sulfamethoxazole W/Trimethoprim] Other (See Comments)     Patient unable to recall     Biaxin [Clarithromycin]      Chlorthalidone Nausea and Vomiting     Clonidine      Codeine Sulfate Itching     Darvon [Propoxyphene Hcl]      Dilaudid [Hydromorphone] Visual Disturbance     Hallucinations       Gabapentin Other (See Comments) and Fatigue     \"felt drunk\"     Indocin      Levaquin [Levofloxacin Hemihydrate]      Lyrica Fatigue     Morphine Sulfate      Percocet [Oxycodone-Acetaminophen] Other (See Comments)     hallucinations     Pregabalin Itching     Other reaction(s): Fatigue     Shrimp Swelling     Spironolactone Other (See Comments)     Dehydrated       Terazosin      Ciprofloxacin Rash     Simvastatin Palpitations     Muscle weakness, leg cramping               Review of Systems         Objective    BP (!) 174/97 (BP Location: Right arm, Patient Position: Sitting, Cuff Size: Adult Regular)   Pulse 83   Resp 16   Ht 1.702 m (5' 7\")   Wt 77 kg (169 lb 12.8 oz)   SpO2 95%   BMI 26.59 kg/m    Body mass index is 26.59 kg/m .  Physical Exam   GENERAL: healthy, alert and no distress  NECK: no adenopathy, no asymmetry, masses, or scars and thyroid normal to palpation  RESP: lungs clear to auscultation - no rales, rhonchi or wheezes  CV: regular rate and rhythm, normal S1 S2, no S3 or S4, no murmur, click or rub, no peripheral edema and peripheral pulses " strong  ABDOMEN: soft, nontender, no hepatosplenomegaly, no masses and bowel sounds normal  MS: no gross musculoskeletal defects noted, one plus edema, no wounds, stasis skin color changes (discussed), mid tiba medial one inch firm subdermal mass slight tender

## 2022-05-13 NOTE — TELEPHONE ENCOUNTER
Left a detailed message to pt's cell phone regarding the result and recommendation. She will be called for the US scheduling.    Soon-Mi  -----------------------------------------------------------------------------------        ----- Message from Pedro Gomez MD sent at 5/13/2022  3:28 PM CDT -----  Can you let her know xray normal  So do US as discussed (she has a nodule mid left shin unexplained)  I ordered it  Can do here or maybe [facility] can do, handy for her

## 2022-05-13 NOTE — NURSING NOTE
Tessa Mansfield is a 87 year old female patient that presents today in clinic for the following:    Chief Complaint   Patient presents with     Musculoskeletal Problem     Pt comes into clinic to discuss bilateral lower leg discoloration and swelling, pt states this has been going on for months     Skin Discoloration     The patient's allergies and medications were reviewed as noted. A set of vitals were recorded as noted without incident. The patient does not have any other questions for the provider.    Yaa Manzano, EMT at 1:02 PM on 5/13/2022

## 2022-05-16 ENCOUNTER — OFFICE VISIT (OUTPATIENT)
Dept: ORTHOPEDICS | Facility: CLINIC | Age: 87
End: 2022-05-16
Payer: COMMERCIAL

## 2022-05-16 ENCOUNTER — ANCILLARY PROCEDURE (OUTPATIENT)
Dept: GENERAL RADIOLOGY | Facility: CLINIC | Age: 87
End: 2022-05-16
Attending: FAMILY MEDICINE
Payer: COMMERCIAL

## 2022-05-16 DIAGNOSIS — M25.571 ACUTE RIGHT ANKLE PAIN: Primary | ICD-10-CM

## 2022-05-16 DIAGNOSIS — M79.671 RIGHT FOOT PAIN: Primary | ICD-10-CM

## 2022-05-16 PROCEDURE — 99203 OFFICE O/P NEW LOW 30 MIN: CPT | Performed by: FAMILY MEDICINE

## 2022-05-16 PROCEDURE — 73630 X-RAY EXAM OF FOOT: CPT | Mod: RT | Performed by: RADIOLOGY

## 2022-05-16 NOTE — PROGRESS NOTES
Chief complaint: CAD     HPI:   Tessa Mansfield is a 86 year old female former patient of Dr. Mir with extensive CAD history as detailed below (PCI to all 3 vessels at various times from 7394-3995, last PCA to RPDA 7/23/13 and known 80% ISR of small LCX) on indefinite dual antiplatelet therapy and sick sinus syndrome s/p dual-chamber PPM who presents for follow up of her chronic cardiovascular conditions.      Last visit (12/10/19):  She underwent pacemaker generator change 10/15/19 due to her prior generator reaching LOLA (new device: Medtronic [id number] Beatrice XT  MRI dual chamber PPM) without event.      TTE 10/15/19 (my read):   Normal LV/RV function, LVEF=60-65%.   At least moderate MR and moderate-to-severe TR.  RVSP~39+RAP~15 mmHg.  Compared to 7/24/13: MR and TR have worsened.      She reports volume status has been about stable and that she has self-titrated furosemide as needed and has done so for some time.      Interval history 05/11/21:  Since her last visit, Tessa reports feeling generally well. She does have occasional episodes of angina (pain between her shoulders) for which she takes 1 NTG every 2-3 months, which has been longstanding and stable.      She denies any dyspnea at rest or with exertion, PND, orthopnea, peripheral edema, palpitations, lightheadedness or syncope.      Interval History 5/17/2022  Patient states that she has had SOB for the last month. States that she is able to walk 1-2 blocks before developing SOB, prior to this she was able to walk about 4 blocks before becoming SOB.  She denies chest pain, chest pressure, lightheadedness or dizziness.         Summary of CAD history:  1.  10/27/2002: AMI s/p PCI+BMS (3.5x18mm Bx velocity) to mRCA  2. 2/5/2003: Back pain; PCI+stent to mLCX c/b distal embolization/slow flow  3. 4/11/2003: Unstable angina; PCI+stent (Hepacoat velocity) to mLAD  4. 11/27/2007: PCI+DESx2 to pLAD (IVUS w/ calcification of LMCA and ulcerated plaque pLAD, 80%  dRCA; also had 80% dLCX, too small to stent)  5. 12/11/2007: Staged PCI. PCI+DESx1 (3.5x13mm Cypher) to dRCA (indefinite DAPT recommended at this time)  6. 6/27/2008: Angina. mLCX stent 70% ISR. LAD and RCA stents patent. Myocardium at risk from LCX felt to be small, medical management preferred to PCI.  7. 11/10/2009: Angina. Findings unchanged from 6/27/2008, FFR LAD 0.90. Medical management recommended.  8. 7/23/2013: Unstable angina; PCI+ALVAREZ to mPDA. Diagnostic findings: LMCA 40% distal. LAD: pLAD stents patent mLAD 30% ISR dLAD 50% diffuse, D2 diffuse disease. LCX 80% mid ISR. RCA diffuse <30% mid, RPLAs diffuse disease, RPDA 100% occlusion.             PAST MEDICAL HISTORY:  Past Medical History:   Diagnosis Date     CAD (coronary artery disease)      CAD s/p PCI+BMS to MRCA 10/2002, PCI to mLCX 2/2003, PCI+DESx2 to pLAD 11/2007, PCI+DESx1 to dRCA 12/2007, PCI+ALVAREZ to RPDA 7/2013; on indefinite DAPT  10/27/2002    10/27/2002: AMI s/p PCI+BMS (3.5x18mm Bx velocity) to mRCA 2/5/2003: Back pain; PCI+stent to mLCX c/b distal embolization/slow flow 4/11/2003: Unstable angina; PCI+stent (Hepacoat velocity) to mLAD 11/27/2007: PCI+DESx2 to pLAD (IVUS w/ calcification of LMCA and ulcerated plaque pLAD, 80% dRCA; also had 80% dLCX, too small to stent) 12/11/2007: Staged PCI. PCI+DESx1 (3.5x13mm Cypher) to dRCA (indefinite DAPT recommended at this time) 6/27/2008: Angina. mLCX stent 70% ISR. LAD and RCA stents patent. Myocardium at risk from LCX felt to be small, medical management preferred to PCI. 11/10/2009: Angina. Findings unchanged from 6/27/2008, FFR LAD 0.90. Medical management recommended. 7/23/2013: Unstable angina; PCI+ALVAREZ to mPDA. Diagnostic findings: LMCA 40% distal. LAD: pLAD stents patent mLAD 30% ISR dLAD 50% diffuse, D2 diffuse disease. LCX 80% mid ISR. RCA diffuse <30% mid, RPLAs diffuse disease, RPDA 100% occlusion.       Cardiac Pacemaker- Medtronic, dual chamber- NOT dependant 11/28/2007     CKD  "(chronic kidney disease), stage IV (H)      Hyperlipidemia LDL goal <70      Hypertension      Stented coronary artery 10-    RCA     Stented coronary artery 2-5-2003    LCx     Stented coronary artery 4-    LAD     Stented coronary artery 11-    LAD     Stented coronary artery 12-    RCA     Transient complete heart block (H) 11/28/2007       CURRENT MEDICATIONS:  Reviewed.     ALLERGIES:     Allergies   Allergen Reactions     Bactrim [Sulfamethoxazole W/Trimethoprim] Other (See Comments)     Patient unable to recall     Biaxin [Clarithromycin]      Chlorthalidone Nausea and Vomiting     Clonidine      Codeine Sulfate Itching     Darvon [Propoxyphene Hcl]      Dilaudid [Hydromorphone] Visual Disturbance     Hallucinations       Gabapentin Other (See Comments) and Fatigue     \"felt drunk\"     Indocin      Levaquin [Levofloxacin Hemihydrate]      Lyrica Fatigue     Morphine Sulfate      Percocet [Oxycodone-Acetaminophen] Other (See Comments)     hallucinations     Pregabalin Itching     Other reaction(s): Fatigue     Shrimp Swelling     Spironolactone Other (See Comments)     Dehydrated       Terazosin      Ciprofloxacin Rash     Simvastatin Palpitations     Muscle weakness, leg cramping         FAMILY HISTORY:  Family History   Problem Relation Age of Onset     Cancer Sister 82        bladder cancer       SOCIAL HISTORY:  Social History     Tobacco Use     Smoking status: Former Smoker     Packs/day: 0.30     Years: 15.00     Pack years: 4.50     Types: Cigarettes     Smokeless tobacco: Never Used     Tobacco comment: Quit 22+ years ago   Substance Use Topics     Drug use: No       ROS:   A comprehensive 14 point review of systems is negative other than as mentioned in HPI.    Exam:  BP (!) 177/85 (BP Location: Right arm, Patient Position: Chair, Cuff Size: Adult Regular)   Pulse 70   Ht 1.689 m (5' 6.5\")   Wt 73.9 kg (163 lb)   SpO2 100%   BMI 25.92 kg/m    GENERAL APPEARANCE: " healthy, alert and no distress  EYES: no icterus, no xanthelasmas  ENT: normal palate, mucosa moist, no central cyanosis  NECK: JVP pre-V 8 (large CV wave)   RESPIRATORY: lungs clear to auscultation - no rales, rhonchi or wheezes, no use of accessory muscles, no retractions, respirations are unlabored, normal respiratory rate  CARDIOVASCULAR: regular rhythm, II/VI systolic murmur LLSB and apex  GI: soft, non tender, bowel sounds normal,no abdominal bruits  EXTREMITIES: no edema, no bruits  NEURO: alert and oriented to person/place/time, normal speech, gait and affect  VASC: Radial, dorsalis pedis and posterior tibialis pulses 2+ bilaterally.  SKIN: no ecchymoses, no rashes.  PSYCH: cooperative, affect appropriate.     Labs:  Reviewed.       Testing/Procedures:  TTE 10/15/19:   Normal LV/RV function, LVEF=60-65%.   At least moderate MR and moderate-to-severe TR.  RVSP~39+RAP~15 mmHg.  Compared to 7/24/13: MR and TR have worsened.      Device check 05/11/21: Normal device function. 86.3% AP <1% . Intrinsic rhythm sinus bradycardia at 48 bpm. No atrial or ventricular arrhythmias. Lead trends stable.     Dr. Santoyo personally visualized:  Device check 3/25/22: Normal device function. AP: 78% : <0.1% Presenting EGM: AP-VS @ 83 bpm. 2 seconds of AT/AF. <0.1% AF burden. No ventricular arrhythmias.     TTE 05/17/22: LVEF:60-65%, normal RV function, moderate MR, moderate TR, normal IVC     Assessment and Plan:   1. CAD s/p multiple PCIs as above (last to RPDA 7/23/13) and known 80% ISR of small mLCX, with stable angina:   Patient presenting with worsening SOB over the last 1-2 months. No evidence of volume overload on exam, normal IVC on echo. Her symptoms of SOB could be related to deconditioning. Will have patient try and increase her functional capacity while we work to optimize her BP. If her symptoms do not improve after this, will pursue ischemic eval with a coronary angiogram and a RHC to assess her filling  pressures.   - Increase amlodipine from 2.5-->5mg today, have patient come to have BP taken in one week, if still worsening will increase amlodipine to 7.5mg, repeat BP check in one week then increase to 10mg if needed  - Will call in 2 and 4 weeks, if symptoms do not improve will obtain coronary angiogram and RHC  - Continue PO Metop 100mg BID  - Continue statin        2. Moderate mitral regurgitation  3. Moderate-to-severe tricuspid regurgitation  - Although having worsening symptoms today, her MR and TR appear unchanged. In particular her moderate MR and TR may also be secondary to elevated systemic HTN. Will continue to monitor for now and will forgo MICHELL unless evaluation above is unrevealing.        4. Renal Hypertension, uncontrolled  5. CKD stage 4  -up-titrate amlodipine as above   -followed by Odilia Martin and Scar        6. Sick sinus syndrome s/p dual chamber PPM:  F/u     Staffed with Dr. Amita Casiano MD  Cardiology Fellow  PGY6    ATTENDING ATTESTATION     Patient was seen and evaluated with Dr. Casiano. History was confirmed personally by me. Labs, imaging studies, EKGs, and telemetry were reviewed. Agree with assessment and plan as outlined above. The note has been edited by me as needed to produce a single, cohesive document.     Tyrell Santoyo MD  Staff Cardiologist

## 2022-05-16 NOTE — PROGRESS NOTES
[facility]  SPORTS & ORTHOPEDIC CLINIC VISIT     May 16, 2022        ASSESSMENT & PLAN    87-year-old with right medial ankle pain and no sign of fracture or significant ligamentous or structural injury.  Suspect soft tissue injury    Reviewed imaging and assessment with patient in detail  Apply ice to the ankle 1-2 times daily  Consider compressing with Ace wrap or ankle sleeve  Elevate when possible  Use of Voltaren or diclofenac gel on the ankle 3 times daily for the next week  Follow-up in clinic in 1 to 2 weeks if not improving.  Sooner if worsening.    Akira Mendoza MD  [facility] SPORTS [facility]ICINE CLINIC [place]    -----  Chief Complaint   Patient presents with     Right Foot - Pain       SUBJECTIVE  Tessa Mansfield is a/an 87 year old female who is seen as a self referral for evaluation of  Right foot pain.     The patient is seen by themselves.  The patient is Right handed    Onset: 1 day(s) ago. Patient describes injury as cooking dinner last night and twisted and had immediate sharp severe pain on the medial side of the foot and ankle.  Slightly improved today  Location of Pain: right medial ankle   Worsened by: walking, dorsal flexion   Better with: NA  Treatments tried: no treatment tried to date  Associated symptoms: swelling    Orthopedic/Surgical history: NO  Social History/Occupation: retired       REVIEW OF SYSTEMS:    Do you have fever, chills, weight loss? No    Do you have any vision problems? No    Do you have any chest pain or edema? No    Do you have any shortness of breath or wheezing?  No    Do you have stomach problems? No    Do you have any numbness or focal weakness? No    Do you have diabetes? No    Do you have problems with bleeding or clotting? No    Do you have an rashes or other skin lesions? No    OBJECTIVE:  There were no vitals taken for this visit.     General: Alert, pleasant, no distress  Right foot: warm, well perfused, SILT throughout      Inspection: Swelling over the medial and lateral ankle.     ROM: Intact but pain with terminal passive dorsiflexion and eversion.     Strength: Pain with plantarflexion against resistance.  Strength intact     Palpation: TTP over the medial ankle inferior to the medial malleolus and distal.  No TTP over the medial malleolus, tibiotalar joint, lateral malleolus.  No significant TTP over the midfoot.     Special Tests: None      RADIOLOGY:    3 view x-rays of the right foot are performed and reviewed independently demonstrating  Healed fracture deformity in the mid fifth metatarsal.  DJD of the first MTP.  Otherwise no acute fracture or dislocation.  See EMR for formal radiology report.

## 2022-05-16 NOTE — LETTER
5/16/2022      RE: Tessa Mansfield  [address]  [facility] [address]     Dear Colleague,    Thank you for referring your patient, Tessa Mansfield, to the [facility] SPORTS MEDICINE [facility]. Please see a copy of my visit note below.      [facility] AND SUR[facility]ERY CENTER  SPORTS & ORTHOPEDIC CLINIC VISIT     May 16, 2022        ASSESSMENT & PLAN    87-year-old with right medial ankle pain and no sign of fracture or significant ligamentous or structural injury.  Suspect soft tissue injury    Reviewed imaging and assessment with patient in detail  Apply ice to the ankle 1-2 times daily  Consider compressing with Ace wrap or ankle sleeve  Elevate when possible  Use of Voltaren or diclofenac gel on the ankle 3 times daily for the next week  Follow-up in clinic in 1 to 2 weeks if not improving.  Sooner if worsening.    Akira Mendoza MD  [facility] SPORTS MEDICINE [facility]    -----  Chief Complaint   Patient presents with     Right Foot - Pain       SUBJECTIVE  Tessa Mansfield is a/an 87 year old female who is seen as a self referral for evaluation of  Right foot pain.     The patient is seen by themselves.  The patient is Right handed    Onset: 1 day(s) ago. Patient describes injury as cooking dinner last night and twisted and had immediate sharp severe pain on the medial side of the foot and ankle.  Slightly improved today  Location of Pain: right medial ankle   Worsened by: walking, dorsal flexion   Better with: NA  Treatments tried: no treatment tried to date  Associated symptoms: swelling    Orthopedic/Surgical history: NO  Social History/Occupation: retired       REVIEW OF SYSTEMS:    Do you have fever, chills, weight loss? No    Do you have any vision problems? No    Do you have any chest pain or edema? No    Do you have any shortness of breath or wheezing?  No    Do you have stomach problems? No    Do you have any numbness or focal weakness? No    Do you have  diabetes? No    Do you have problems with bleeding or clotting? No    Do you have an rashes or other skin lesions? No    OBJECTIVE:  There were no vitals taken for this visit.     General: Alert, pleasant, no distress  Right foot: warm, well perfused, SILT throughout     Inspection: Swelling over the medial and lateral ankle.     ROM: Intact but pain with terminal passive dorsiflexion and eversion.     Strength: Pain with plantarflexion against resistance.  Strength intact     Palpation: TTP over the medial ankle inferior to the medial malleolus and distal.  No TTP over the medial malleolus, tibiotalar joint, lateral malleolus.  No significant TTP over the midfoot.     Special Tests: None      RADIOLOGY:    3 view x-rays of the right foot are performed and reviewed independently demonstrating  Healed fracture deformity in the mid fifth metatarsal.  DJD of the first MTP.  Otherwise no acute fracture or dislocation.  See EMR for formal radiology report.       Again, thank you for allowing me to participate in the care of your patient.      Sincerely,    Akira Mendoza MD

## 2022-05-16 NOTE — PATIENT INSTRUCTIONS
Apply ice to the ankle 1-2 times daily  Consider compressing with Ace wrap or ankle sleeve  Elevate when possible  Use of Voltaren or diclofenac gel on the ankle 3 times daily for the next week  Follow-up in clinic in 1 to 2 weeks if not improving.  Sooner if worsening.

## 2022-05-17 ENCOUNTER — LAB (OUTPATIENT)
Dept: LAB | Facility: CLINIC | Age: 87
End: 2022-05-17

## 2022-05-17 ENCOUNTER — OFFICE VISIT (OUTPATIENT)
Dept: CARDIOLOGY | Facility: CLINIC | Age: 87
End: 2022-05-17
Attending: INTERNAL MEDICINE
Payer: COMMERCIAL

## 2022-05-17 ENCOUNTER — ANCILLARY PROCEDURE (OUTPATIENT)
Dept: CARDIOLOGY | Facility: CLINIC | Age: 87
End: 2022-05-17
Payer: COMMERCIAL

## 2022-05-17 VITALS
SYSTOLIC BLOOD PRESSURE: 155 MMHG | OXYGEN SATURATION: 100 % | BODY MASS INDEX: 26.2 KG/M2 | HEIGHT: 66 IN | WEIGHT: 163 LBS | HEART RATE: 70 BPM | DIASTOLIC BLOOD PRESSURE: 75 MMHG

## 2022-05-17 DIAGNOSIS — R06.02 SHORTNESS OF BREATH: Primary | ICD-10-CM

## 2022-05-17 DIAGNOSIS — N18.4 CKD (CHRONIC KIDNEY DISEASE) STAGE 4, GFR 15-29 ML/MIN (H): ICD-10-CM

## 2022-05-17 DIAGNOSIS — I49.5 SICK SINUS SYNDROME (H): ICD-10-CM

## 2022-05-17 DIAGNOSIS — I10 ESSENTIAL HYPERTENSION: ICD-10-CM

## 2022-05-17 DIAGNOSIS — I12.9 RENAL HYPERTENSION: ICD-10-CM

## 2022-05-17 DIAGNOSIS — I36.1 NON-RHEUMATIC TRICUSPID VALVE INSUFFICIENCY: ICD-10-CM

## 2022-05-17 DIAGNOSIS — R07.9 CHEST PAIN, UNSPECIFIED TYPE: ICD-10-CM

## 2022-05-17 DIAGNOSIS — I34.0 NON-RHEUMATIC MITRAL REGURGITATION: ICD-10-CM

## 2022-05-17 DIAGNOSIS — I25.118 CORONARY ARTERY DISEASE OF NATIVE ARTERY OF NATIVE HEART WITH STABLE ANGINA PECTORIS (H): ICD-10-CM

## 2022-05-17 DIAGNOSIS — Z95.0 CARDIAC PACEMAKER IN SITU: ICD-10-CM

## 2022-05-17 LAB
ANION GAP SERPL CALCULATED.3IONS-SCNC: 7 MMOL/L (ref 3–14)
BASOPHILS # BLD MANUAL: 0.2 10E3/UL (ref 0–0.2)
BASOPHILS NFR BLD MANUAL: 3 %
BUN SERPL-MCNC: 37 MG/DL (ref 7–30)
CALCIUM SERPL-MCNC: 9.1 MG/DL (ref 8.5–10.1)
CHLORIDE BLD-SCNC: 112 MMOL/L (ref 94–109)
CO2 SERPL-SCNC: 27 MMOL/L (ref 20–32)
CREAT SERPL-MCNC: 1.8 MG/DL (ref 0.52–1.04)
EOSINOPHIL # BLD MANUAL: 0 10E3/UL (ref 0–0.7)
EOSINOPHIL NFR BLD MANUAL: 0 %
ERYTHROCYTE [DISTWIDTH] IN BLOOD BY AUTOMATED COUNT: 15.8 % (ref 10–15)
GFR SERPL CREATININE-BSD FRML MDRD: 27 ML/MIN/1.73M2
GLUCOSE BLD-MCNC: 96 MG/DL (ref 70–99)
HCT VFR BLD AUTO: 37.5 % (ref 35–47)
HGB BLD-MCNC: 11.3 G/DL (ref 11.7–15.7)
LVEF ECHO: NORMAL
LYMPHOCYTES # BLD MANUAL: 1.7 10E3/UL (ref 0.8–5.3)
LYMPHOCYTES NFR BLD MANUAL: 23 %
MCH RBC QN AUTO: 29.3 PG (ref 26.5–33)
MCHC RBC AUTO-ENTMCNC: 30.1 G/DL (ref 31.5–36.5)
MCV RBC AUTO: 97 FL (ref 78–100)
MONOCYTES # BLD MANUAL: 0.8 10E3/UL (ref 0–1.3)
MONOCYTES NFR BLD MANUAL: 11 %
MYELOCYTES # BLD MANUAL: 0.2 10E3/UL
MYELOCYTES NFR BLD MANUAL: 2 %
NEUTROPHILS # BLD MANUAL: 4.4 10E3/UL (ref 1.6–8.3)
NEUTROPHILS NFR BLD MANUAL: 58 %
PLAT MORPH BLD: ABNORMAL
PLATELET # BLD AUTO: 75 10E3/UL (ref 150–450)
POTASSIUM BLD-SCNC: 4.2 MMOL/L (ref 3.4–5.3)
PROMYELOCYTES # BLD MANUAL: 0.2 10E3/UL
PROMYELOCYTES NFR BLD MANUAL: 3 %
RBC # BLD AUTO: 3.86 10E6/UL (ref 3.8–5.2)
RBC MORPH BLD: ABNORMAL
SODIUM SERPL-SCNC: 146 MMOL/L (ref 133–144)
WBC # BLD AUTO: 7.6 10E3/UL (ref 4–11)

## 2022-05-17 PROCEDURE — 80048 BASIC METABOLIC PNL TOTAL CA: CPT | Performed by: PATHOLOGY

## 2022-05-17 PROCEDURE — 99214 OFFICE O/P EST MOD 30 MIN: CPT | Mod: 25 | Performed by: INTERNAL MEDICINE

## 2022-05-17 PROCEDURE — 85027 COMPLETE CBC AUTOMATED: CPT | Performed by: PATHOLOGY

## 2022-05-17 PROCEDURE — 93280 PM DEVICE PROGR EVAL DUAL: CPT | Performed by: INTERNAL MEDICINE

## 2022-05-17 PROCEDURE — 85007 BL SMEAR W/DIFF WBC COUNT: CPT | Performed by: PATHOLOGY

## 2022-05-17 PROCEDURE — G0463 HOSPITAL OUTPT CLINIC VISIT: HCPCS | Mod: 25

## 2022-05-17 PROCEDURE — 36415 COLL VENOUS BLD VENIPUNCTURE: CPT | Performed by: PATHOLOGY

## 2022-05-17 PROCEDURE — 93306 TTE W/DOPPLER COMPLETE: CPT | Performed by: INTERNAL MEDICINE

## 2022-05-17 RX ORDER — AMLODIPINE BESYLATE 2.5 MG/1
5 TABLET ORAL DAILY
Qty: 90 TABLET | Refills: 3 | Status: SHIPPED | OUTPATIENT
Start: 2022-05-17 | End: 2022-08-08

## 2022-05-17 RX ORDER — ISOSORBIDE MONONITRATE 20 MG/1
60 TABLET ORAL DAILY
Qty: 540 TABLET | Refills: 3
Start: 2022-05-17 | End: 2022-05-27

## 2022-05-17 ASSESSMENT — PAIN SCALES - GENERAL: PAINLEVEL: NO PAIN (0)

## 2022-05-17 NOTE — NURSING NOTE
Chief Complaint   Patient presents with     Follow Up      Return cardiology, 86 yo female with CAD, essential hypertension, NSTEMI presents for annual follow up with echo and device check prior.       Vitals were taken and medications reconciled.    Nikolai Stevens, EMT  1:57 PM

## 2022-05-17 NOTE — LETTER
5/17/2022      RE: Tessa Mansfield  [address]  [facility] [address]       Dear Colleague,    Thank you for the opportunity to participate in the care of your patient, Tessa Mansfield, at the [facility] [place] [facility]. Please see a copy of my visit note below.    Chief complaint: CAD     HPI:   Tessa Mansfield is a 86 year old female former patient of Dr. Mir with extensive CAD history as detailed below (PCI to all 3 vessels at various times from 9694-5705, last PCA to RPDA 7/23/13 and known 80% ISR of small LCX) on indefinite dual antiplatelet therapy and sick sinus syndrome s/p dual-chamber PPM who presents for follow up of her chronic cardiovascular conditions.      Last visit (12/10/19):  She underwent pacemaker generator change 10/15/19 due to her prior generator reaching LOLA (new device: Medtronic [id number] Romulus XT  MRI dual chamber PPM) without event.      TTE 10/15/19 (my read):   Normal LV/RV function, LVEF=60-65%.   At least moderate MR and moderate-to-severe TR.  RVSP~39+RAP~15 mmHg.  Compared to 7/24/13: MR and TR have worsened.      She reports volume status has been about stable and that she has self-titrated furosemide as needed and has done so for some time.      Interval history 05/11/21:  Since her last visit, Tessa reports feeling generally well. She does have occasional episodes of angina (pain between her shoulders) for which she takes 1 NTG every 2-3 months, which has been longstanding and stable.      She denies any dyspnea at rest or with exertion, PND, orthopnea, peripheral edema, palpitations, lightheadedness or syncope.      Interval History 5/17/2022  Patient states that she has had SOB for the last month. States that she is able to walk 1-2 blocks before developing SOB, prior to this she was able to walk about 4 blocks before becoming SOB.  She denies chest pain, chest pressure, lightheadedness or  dizziness.         Summary of CAD history:  1.  10/27/2002: AMI s/p PCI+BMS (3.5x18mm Bx velocity) to mRCA  2. 2/5/2003: Back pain; PCI+stent to mLCX c/b distal embolization/slow flow  3. 4/11/2003: Unstable angina; PCI+stent (Hepacoat velocity) to mLAD  4. 11/27/2007: PCI+DESx2 to pLAD (IVUS w/ calcification of LMCA and ulcerated plaque pLAD, 80% dRCA; also had 80% dLCX, too small to stent)  5. 12/11/2007: Staged PCI. PCI+DESx1 (3.5x13mm Cypher) to dRCA (indefinite DAPT recommended at this time)  6. 6/27/2008: Angina. mLCX stent 70% ISR. LAD and RCA stents patent. Myocardium at risk from LCX felt to be small, medical management preferred to PCI.  7. 11/10/2009: Angina. Findings unchanged from 6/27/2008, FFR LAD 0.90. Medical management recommended.  8. 7/23/2013: Unstable angina; PCI+ALVAREZ to mPDA. Diagnostic findings: LMCA 40% distal. LAD: pLAD stents patent mLAD 30% ISR dLAD 50% diffuse, D2 diffuse disease. LCX 80% mid ISR. RCA diffuse <30% mid, RPLAs diffuse disease, RPDA 100% occlusion.             PAST MEDICAL HISTORY:  Past Medical History:   Diagnosis Date     CAD (coronary artery disease)      CAD s/p PCI+BMS to MRCA 10/2002, PCI to mLCX 2/2003, PCI+DESx2 to pLAD 11/2007, PCI+DESx1 to dRCA 12/2007, PCI+ALVAREZ to RPDA 7/2013; on indefinite DAPT  10/27/2002    10/27/2002: AMI s/p PCI+BMS (3.5x18mm Bx velocity) to mRCA 2/5/2003: Back pain; PCI+stent to mLCX c/b distal embolization/slow flow 4/11/2003: Unstable angina; PCI+stent (Hepacoat velocity) to mLAD 11/27/2007: PCI+DESx2 to pLAD (IVUS w/ calcification of LMCA and ulcerated plaque pLAD, 80% dRCA; also had 80% dLCX, too small to stent) 12/11/2007: Staged PCI. PCI+DESx1 (3.5x13mm Cypher) to dRCA (indefinite DAPT recommended at this time) 6/27/2008: Angina. mLCX stent 70% ISR. LAD and RCA stents patent. Myocardium at risk from LCX felt to be small, medical management preferred to PCI. 11/10/2009: Angina. Findings unchanged from 6/27/2008, FFR LAD 0.90. Medical  "management recommended. 7/23/2013: Unstable angina; PCI+ALVAREZ to mPDA. Diagnostic findings: LMCA 40% distal. LAD: pLAD stents patent mLAD 30% ISR dLAD 50% diffuse, D2 diffuse disease. LCX 80% mid ISR. RCA diffuse <30% mid, RPLAs diffuse disease, RPDA 100% occlusion.       Cardiac Pacemaker- Medtronic, dual chamber- NOT dependant 11/28/2007     CKD (chronic kidney disease), stage IV (H)      Hyperlipidemia LDL goal <70      Hypertension      Stented coronary artery 10-    RCA     Stented coronary artery 2-5-2003    LCx     Stented coronary artery 4-    LAD     Stented coronary artery 11-    LAD     Stented coronary artery 12-    RCA     Transient complete heart block (H) 11/28/2007       CURRENT MEDICATIONS:  Reviewed.     ALLERGIES:     Allergies   Allergen Reactions     Bactrim [Sulfamethoxazole W/Trimethoprim] Other (See Comments)     Patient unable to recall     Biaxin [Clarithromycin]      Chlorthalidone Nausea and Vomiting     Clonidine      Codeine Sulfate Itching     Darvon [Propoxyphene Hcl]      Dilaudid [Hydromorphone] Visual Disturbance     Hallucinations       Gabapentin Other (See Comments) and Fatigue     \"felt drunk\"     Indocin      Levaquin [Levofloxacin Hemihydrate]      Lyrica Fatigue     Morphine Sulfate      Percocet [Oxycodone-Acetaminophen] Other (See Comments)     hallucinations     Pregabalin Itching     Other reaction(s): Fatigue     Shrimp Swelling     Spironolactone Other (See Comments)     Dehydrated       Terazosin      Ciprofloxacin Rash     Simvastatin Palpitations     Muscle weakness, leg cramping         FAMILY HISTORY:  Family History   Problem Relation Age of Onset     Cancer Sister 82        bladder cancer       SOCIAL HISTORY:  Social History     Tobacco Use     Smoking status: Former Smoker     Packs/day: 0.30     Years: 15.00     Pack years: 4.50     Types: Cigarettes     Smokeless tobacco: Never Used     Tobacco comment: Quit 22+ years ago " "  Substance Use Topics     Drug use: No       ROS:   A comprehensive 14 point review of systems is negative other than as mentioned in HPI.    Exam:  BP (!) 177/85 (BP Location: Right arm, Patient Position: Chair, Cuff Size: Adult Regular)   Pulse 70   Ht 1.689 m (5' 6.5\")   Wt 73.9 kg (163 lb)   SpO2 100%   BMI 25.92 kg/m    GENERAL APPEARANCE: healthy, alert and no distress  EYES: no icterus, no xanthelasmas  ENT: normal palate, mucosa moist, no central cyanosis  NECK: JVP pre-V 8 (large CV wave)   RESPIRATORY: lungs clear to auscultation - no rales, rhonchi or wheezes, no use of accessory muscles, no retractions, respirations are unlabored, normal respiratory rate  CARDIOVASCULAR: regular rhythm, II/VI systolic murmur LLSB and apex  GI: soft, non tender, bowel sounds normal,no abdominal bruits  EXTREMITIES: no edema, no bruits  NEURO: alert and oriented to person/place/time, normal speech, gait and affect  VASC: Radial, dorsalis pedis and posterior tibialis pulses 2+ bilaterally.  SKIN: no ecchymoses, no rashes.  PSYCH: cooperative, affect appropriate.     Labs:  Reviewed.       Testing/Procedures:  TTE 10/15/19:   Normal LV/RV function, LVEF=60-65%.   At least moderate MR and moderate-to-severe TR.  RVSP~39+RAP~15 mmHg.  Compared to 7/24/13: MR and TR have worsened.      Device check 05/11/21: Normal device function. 86.3% AP <1% . Intrinsic rhythm sinus bradycardia at 48 bpm. No atrial or ventricular arrhythmias. Lead trends stable.     Dr. Santoyo personally visualized:  Device check 3/25/22: Normal device function. AP: 78% : <0.1% Presenting EGM: AP-VS @ 83 bpm. 2 seconds of AT/AF. <0.1% AF burden. No ventricular arrhythmias.     TTE 05/17/22: LVEF:60-65%, normal RV function, moderate MR, moderate TR, normal IVC     Assessment and Plan:   1. CAD s/p multiple PCIs as above (last to RPDA 7/23/13) and known 80% ISR of small mLCX, with stable angina:   Patient presenting with worsening SOB over the " last 1-2 months. No evidence of volume overload on exam, normal IVC on echo. Her symptoms of SOB could be related to deconditioning. Will have patient try and increase her functional capacity while we work to optimize her BP. If her symptoms do not improve after this, will pursue ischemic eval with a coronary angiogram and a RHC to assess her filling pressures.   - Increase amlodipine from 2.5-->5mg today, have patient come to have BP taken in one week, if still worsening will increase amlodipine to 7.5mg, repeat BP check in one week then increase to 10mg if needed  - Will call in 2 and 4 weeks, if symptoms do not improve will obtain coronary angiogram and RHC  - Continue PO Metop 100mg BID  - Continue statin        2. Moderate mitral regurgitation  3. Moderate-to-severe tricuspid regurgitation  - Although having worsening symptoms today, her MR and TR appear unchanged. In particular her moderate MR and TR may also be secondary to elevated systemic HTN. Will continue to monitor for now and will forgo MICHELL unless evaluation above is unrevealing.        4. Renal Hypertension, uncontrolled  5. CKD stage 4  -up-titrate amlodipine as above   -followed by Odilia Martin and Scar        6. Sick sinus syndrome s/p dual chamber PPM:  F/u     Staffed with Dr. Amita Casiano MD  Cardiology Fellow  PGY6    ATTENDING ATTESTATION     Patient was seen and evaluated with Dr. Casiano. History was confirmed personally by me. Labs, imaging studies, EKGs, and telemetry were reviewed. Agree with assessment and plan as outlined above. The note has been edited by me as needed to produce a single, cohesive document.     Tyrell Santoyo MD  Staff Cardiologist

## 2022-05-17 NOTE — PROGRESS NOTES
5/17/22: HIGGINS since 1/2022, worsening 1/2022-4/2022, stable over comfort past month. Can walk up 4 flights of stairs without stopping but dysspneic at the top. Previously could have done all without difficulty. Atrributes to lack of exercise (used to walk regularly.)   Has started walking with , can walk 2-3 long blocks; significant decrease from 1 year ago (used to walk several miles.)   Takes extra PRN lasix when edema occurs. Weights stable at home (~163 lbs)    -amlodipine 2.5>5>7.5>10  -increase activity  -check bps  -call in 2 and 4 weeks, to assess bp, sx  -if functional capacity fails to improve, cors/RHC  -f/u 6 mo

## 2022-05-17 NOTE — PATIENT INSTRUCTIONS
"Cardiology Providers you saw during your visit:  Dr. Santoyo    Medication changes:  Increase amlodipine to 5 mg daily (starting tomorrow 5/18). After 1 week, increase amlodipine to 7.5 mg daily (starting on 5/25). In 2 weeks, increase amlodipine to 10 mg daily (starting on 6/1).    Follow up:  In 2 weeks check in and send BP readings.  In 4 weeks check in and send BP readings.  Increase exercise over the next 4 weeks. If your symptoms are better in 4 weeks, we will have you proceed with coronary angiogram and right heart catheterization.  Follow up with Dr. Santoyo in 6 months.    Labs:  none    Please call if you have :  1. Weight gain of more than 2 pounds in a day or 5 pounds in a week  2. Increased shortness of breath, swelling or bloating  3. Dizziness, lightheadedness   4. Any questions or concerns.     Follow the American Heart Association Diet and Lifestyle recommendations:  Limit saturated fat, trans fat, sodium, red meat, sweets and sugar-sweetened beverages. If you choose to eat red meat, compare labels and select the leanest cuts available.  Aim for at least 150 minutes of moderate physical activity or 75 minutes of vigorous physical activity - or an equal combination of both - each week.    During business hours: [phone], option # 1 \"To leave a message for your care team\"     After hours, weekends or holidays: On Call Cardiologist [phone]   option #4 and ask to speak to the on-call Cardiologist. Inform them you are a CORE/heart failure patient at the [facility].    Rivka Chou RN BSN  Cardiology Nurse Coordinator - Heart Failure Clinic  [facility]  [phone] option 1 to schedule an appointment or leave a message for your care team  "

## 2022-05-17 NOTE — NURSING NOTE
Return Appointment: Patient given instructions regarding scheduling next clinic visit. Patient demonstrated understanding of this information and agreed to call with further questions or concerns.    Medication Change: Patient was educated regarding prescribed medication change, including discussion of the indication, administration, side effects, and when to report to MD or RN. Patient demonstrated understanding of this information and agreed to call with further questions or concerns. Increasing amlodipine to 5 mg for 1 week, 7.5 the next week, 10 the following week.    Patient stated she understood all health information given and agreed to call with further questions or concerns.    Rivka Chou, RN

## 2022-05-17 NOTE — PATIENT INSTRUCTIONS
It was a pleasure to see you in clinic today.  Please do not hesitate to call with any questions or concerns.  You will be scheduled for a remote transmission every 3 months. Your next remote transmission is scheduled for 8/19/22.  We look forward to seeing you in clinic at your next device check in 12 months.    Ilda Cruz, JEVONN, RN  Electrophysiology Nurse Clinician  [facility]    During Business Hours Please Call:  [phone]  After Hours Please Call:  [phone] - select option #4 and ask for job code [phone]

## 2022-05-18 ENCOUNTER — ANCILLARY PROCEDURE (OUTPATIENT)
Dept: ULTRASOUND IMAGING | Facility: CLINIC | Age: 87
End: 2022-05-18
Attending: FAMILY MEDICINE
Payer: COMMERCIAL

## 2022-05-18 DIAGNOSIS — R22.42 MASS OF LEFT LOWER EXTREMITY: ICD-10-CM

## 2022-05-18 LAB
MDC_IDC_LEAD_IMPLANT_DT: NORMAL
MDC_IDC_LEAD_IMPLANT_DT: NORMAL
MDC_IDC_LEAD_LOCATION: NORMAL
MDC_IDC_LEAD_LOCATION: NORMAL
MDC_IDC_LEAD_LOCATION_DETAIL_1: NORMAL
MDC_IDC_LEAD_LOCATION_DETAIL_1: NORMAL
MDC_IDC_LEAD_MFG: NORMAL
MDC_IDC_LEAD_MFG: NORMAL
MDC_IDC_LEAD_MODEL: NORMAL
MDC_IDC_LEAD_MODEL: NORMAL
MDC_IDC_LEAD_POLARITY_TYPE: NORMAL
MDC_IDC_LEAD_POLARITY_TYPE: NORMAL
MDC_IDC_LEAD_SERIAL: NORMAL
MDC_IDC_LEAD_SERIAL: NORMAL
MDC_IDC_LEAD_SPECIAL_FUNCTION: NORMAL
MDC_IDC_LEAD_SPECIAL_FUNCTION: NORMAL
MDC_IDC_MSMT_BATTERY_DTM: NORMAL
MDC_IDC_MSMT_BATTERY_REMAINING_LONGEVITY: 133 MO
MDC_IDC_MSMT_BATTERY_RRT_TRIGGER: 2.62
MDC_IDC_MSMT_BATTERY_STATUS: NORMAL
MDC_IDC_MSMT_BATTERY_VOLTAGE: 3.02 V
MDC_IDC_MSMT_LEADCHNL_RA_IMPEDANCE_VALUE: 380 OHM
MDC_IDC_MSMT_LEADCHNL_RA_PACING_THRESHOLD_AMPLITUDE: 0.5 V
MDC_IDC_MSMT_LEADCHNL_RA_PACING_THRESHOLD_PULSEWIDTH: 0.4 MS
MDC_IDC_MSMT_LEADCHNL_RA_SENSING_INTR_AMPL: 2.4 MV
MDC_IDC_MSMT_LEADCHNL_RV_IMPEDANCE_VALUE: 475 OHM
MDC_IDC_MSMT_LEADCHNL_RV_PACING_THRESHOLD_AMPLITUDE: 1 V
MDC_IDC_MSMT_LEADCHNL_RV_PACING_THRESHOLD_PULSEWIDTH: 0.4 MS
MDC_IDC_MSMT_LEADCHNL_RV_SENSING_INTR_AMPL: 13.8 MV
MDC_IDC_PG_IMPLANT_DTM: NORMAL
MDC_IDC_PG_MFG: NORMAL
MDC_IDC_PG_MODEL: NORMAL
MDC_IDC_PG_SERIAL: NORMAL
MDC_IDC_PG_TYPE: NORMAL
MDC_IDC_SESS_CLINIC_NAME: NORMAL
MDC_IDC_SESS_DTM: NORMAL
MDC_IDC_SESS_TYPE: NORMAL
MDC_IDC_SET_BRADY_AT_MODE_SWITCH_RATE: 171 {BEATS}/MIN
MDC_IDC_SET_BRADY_HYSTRATE: NORMAL
MDC_IDC_SET_BRADY_LOWRATE: 60 {BEATS}/MIN
MDC_IDC_SET_BRADY_MAX_SENSOR_RATE: 130 {BEATS}/MIN
MDC_IDC_SET_BRADY_MAX_TRACKING_RATE: 130 {BEATS}/MIN
MDC_IDC_SET_BRADY_MODE: NORMAL
MDC_IDC_SET_BRADY_PAV_DELAY_LOW: 180 MS
MDC_IDC_SET_BRADY_SAV_DELAY_LOW: 150 MS
MDC_IDC_SET_LEADCHNL_RA_PACING_AMPLITUDE: 1.5 V
MDC_IDC_SET_LEADCHNL_RA_PACING_ANODE_ELECTRODE_1: NORMAL
MDC_IDC_SET_LEADCHNL_RA_PACING_ANODE_LOCATION_1: NORMAL
MDC_IDC_SET_LEADCHNL_RA_PACING_CAPTURE_MODE: NORMAL
MDC_IDC_SET_LEADCHNL_RA_PACING_CATHODE_ELECTRODE_1: NORMAL
MDC_IDC_SET_LEADCHNL_RA_PACING_CATHODE_LOCATION_1: NORMAL
MDC_IDC_SET_LEADCHNL_RA_PACING_POLARITY: NORMAL
MDC_IDC_SET_LEADCHNL_RA_PACING_PULSEWIDTH: 0.4 MS
MDC_IDC_SET_LEADCHNL_RA_SENSING_ANODE_ELECTRODE_1: NORMAL
MDC_IDC_SET_LEADCHNL_RA_SENSING_ANODE_LOCATION_1: NORMAL
MDC_IDC_SET_LEADCHNL_RA_SENSING_CATHODE_ELECTRODE_1: NORMAL
MDC_IDC_SET_LEADCHNL_RA_SENSING_CATHODE_LOCATION_1: NORMAL
MDC_IDC_SET_LEADCHNL_RA_SENSING_POLARITY: NORMAL
MDC_IDC_SET_LEADCHNL_RA_SENSING_SENSITIVITY: 0.3 MV
MDC_IDC_SET_LEADCHNL_RV_PACING_AMPLITUDE: 2 V
MDC_IDC_SET_LEADCHNL_RV_PACING_ANODE_ELECTRODE_1: NORMAL
MDC_IDC_SET_LEADCHNL_RV_PACING_ANODE_LOCATION_1: NORMAL
MDC_IDC_SET_LEADCHNL_RV_PACING_CAPTURE_MODE: NORMAL
MDC_IDC_SET_LEADCHNL_RV_PACING_CATHODE_ELECTRODE_1: NORMAL
MDC_IDC_SET_LEADCHNL_RV_PACING_CATHODE_LOCATION_1: NORMAL
MDC_IDC_SET_LEADCHNL_RV_PACING_POLARITY: NORMAL
MDC_IDC_SET_LEADCHNL_RV_PACING_PULSEWIDTH: 0.4 MS
MDC_IDC_SET_LEADCHNL_RV_SENSING_ANODE_ELECTRODE_1: NORMAL
MDC_IDC_SET_LEADCHNL_RV_SENSING_ANODE_LOCATION_1: NORMAL
MDC_IDC_SET_LEADCHNL_RV_SENSING_CATHODE_ELECTRODE_1: NORMAL
MDC_IDC_SET_LEADCHNL_RV_SENSING_CATHODE_LOCATION_1: NORMAL
MDC_IDC_SET_LEADCHNL_RV_SENSING_POLARITY: NORMAL
MDC_IDC_SET_LEADCHNL_RV_SENSING_SENSITIVITY: 0.9 MV
MDC_IDC_SET_ZONE_DETECTION_INTERVAL: 350 MS
MDC_IDC_SET_ZONE_DETECTION_INTERVAL: 400 MS
MDC_IDC_SET_ZONE_TYPE: NORMAL
MDC_IDC_STAT_AT_BURDEN_PERCENT: 0 %
MDC_IDC_STAT_AT_DTM_END: NORMAL
MDC_IDC_STAT_AT_DTM_START: NORMAL
MDC_IDC_STAT_BRADY_AP_VP_PERCENT: 0.03 %
MDC_IDC_STAT_BRADY_AP_VS_PERCENT: 79.65 %
MDC_IDC_STAT_BRADY_AS_VP_PERCENT: 0.02 %
MDC_IDC_STAT_BRADY_AS_VS_PERCENT: 20.3 %
MDC_IDC_STAT_BRADY_DTM_END: NORMAL
MDC_IDC_STAT_BRADY_DTM_START: NORMAL
MDC_IDC_STAT_BRADY_RA_PERCENT_PACED: 80.61 %
MDC_IDC_STAT_BRADY_RV_PERCENT_PACED: 0.05 %
MDC_IDC_STAT_EPISODE_RECENT_COUNT: 0
MDC_IDC_STAT_EPISODE_RECENT_COUNT_DTM_END: NORMAL
MDC_IDC_STAT_EPISODE_RECENT_COUNT_DTM_START: NORMAL
MDC_IDC_STAT_EPISODE_TOTAL_COUNT: 0
MDC_IDC_STAT_EPISODE_TOTAL_COUNT: 0
MDC_IDC_STAT_EPISODE_TOTAL_COUNT: 1
MDC_IDC_STAT_EPISODE_TOTAL_COUNT_DTM_END: NORMAL
MDC_IDC_STAT_EPISODE_TOTAL_COUNT_DTM_START: NORMAL
MDC_IDC_STAT_EPISODE_TYPE: NORMAL

## 2022-05-18 PROCEDURE — 76882 US LMTD JT/FCL EVL NVASC XTR: CPT | Mod: TC | Performed by: RADIOLOGY

## 2022-05-26 LAB
MDC_IDC_LEAD_IMPLANT_DT: NORMAL
MDC_IDC_LEAD_IMPLANT_DT: NORMAL
MDC_IDC_LEAD_LOCATION: NORMAL
MDC_IDC_LEAD_LOCATION: NORMAL
MDC_IDC_LEAD_LOCATION_DETAIL_1: NORMAL
MDC_IDC_LEAD_LOCATION_DETAIL_1: NORMAL
MDC_IDC_LEAD_MFG: NORMAL
MDC_IDC_LEAD_MFG: NORMAL
MDC_IDC_LEAD_MODEL: NORMAL
MDC_IDC_LEAD_MODEL: NORMAL
MDC_IDC_LEAD_POLARITY_TYPE: NORMAL
MDC_IDC_LEAD_POLARITY_TYPE: NORMAL
MDC_IDC_LEAD_SERIAL: NORMAL
MDC_IDC_LEAD_SERIAL: NORMAL
MDC_IDC_LEAD_SPECIAL_FUNCTION: NORMAL
MDC_IDC_LEAD_SPECIAL_FUNCTION: NORMAL
MDC_IDC_MSMT_BATTERY_DTM: NORMAL
MDC_IDC_MSMT_BATTERY_REMAINING_LONGEVITY: 133 MO
MDC_IDC_MSMT_BATTERY_RRT_TRIGGER: 2.62
MDC_IDC_MSMT_BATTERY_STATUS: NORMAL
MDC_IDC_MSMT_BATTERY_VOLTAGE: 3.02 V
MDC_IDC_MSMT_LEADCHNL_RA_IMPEDANCE_VALUE: 304 OHM
MDC_IDC_MSMT_LEADCHNL_RA_IMPEDANCE_VALUE: 342 OHM
MDC_IDC_MSMT_LEADCHNL_RA_PACING_THRESHOLD_AMPLITUDE: 0.5 V
MDC_IDC_MSMT_LEADCHNL_RA_PACING_THRESHOLD_PULSEWIDTH: 0.4 MS
MDC_IDC_MSMT_LEADCHNL_RA_SENSING_INTR_AMPL: 2.25 MV
MDC_IDC_MSMT_LEADCHNL_RA_SENSING_INTR_AMPL: 2.25 MV
MDC_IDC_MSMT_LEADCHNL_RV_IMPEDANCE_VALUE: 399 OHM
MDC_IDC_MSMT_LEADCHNL_RV_IMPEDANCE_VALUE: 437 OHM
MDC_IDC_MSMT_LEADCHNL_RV_PACING_THRESHOLD_AMPLITUDE: 0.88 V
MDC_IDC_MSMT_LEADCHNL_RV_PACING_THRESHOLD_PULSEWIDTH: 0.4 MS
MDC_IDC_MSMT_LEADCHNL_RV_SENSING_INTR_AMPL: 11.62 MV
MDC_IDC_MSMT_LEADCHNL_RV_SENSING_INTR_AMPL: 11.62 MV
MDC_IDC_PG_IMPLANT_DTM: NORMAL
MDC_IDC_PG_MFG: NORMAL
MDC_IDC_PG_MODEL: NORMAL
MDC_IDC_PG_SERIAL: NORMAL
MDC_IDC_PG_TYPE: NORMAL
MDC_IDC_SESS_CLINIC_NAME: NORMAL
MDC_IDC_SESS_DTM: NORMAL
MDC_IDC_SESS_TYPE: NORMAL
MDC_IDC_SET_BRADY_AT_MODE_SWITCH_RATE: 171 {BEATS}/MIN
MDC_IDC_SET_BRADY_HYSTRATE: NORMAL
MDC_IDC_SET_BRADY_LOWRATE: 60 {BEATS}/MIN
MDC_IDC_SET_BRADY_MAX_SENSOR_RATE: 130 {BEATS}/MIN
MDC_IDC_SET_BRADY_MAX_TRACKING_RATE: 130 {BEATS}/MIN
MDC_IDC_SET_BRADY_MODE: NORMAL
MDC_IDC_SET_BRADY_PAV_DELAY_LOW: 180 MS
MDC_IDC_SET_BRADY_SAV_DELAY_LOW: 150 MS
MDC_IDC_SET_LEADCHNL_RA_PACING_AMPLITUDE: 1.5 V
MDC_IDC_SET_LEADCHNL_RA_PACING_ANODE_ELECTRODE_1: NORMAL
MDC_IDC_SET_LEADCHNL_RA_PACING_ANODE_LOCATION_1: NORMAL
MDC_IDC_SET_LEADCHNL_RA_PACING_CAPTURE_MODE: NORMAL
MDC_IDC_SET_LEADCHNL_RA_PACING_CATHODE_ELECTRODE_1: NORMAL
MDC_IDC_SET_LEADCHNL_RA_PACING_CATHODE_LOCATION_1: NORMAL
MDC_IDC_SET_LEADCHNL_RA_PACING_POLARITY: NORMAL
MDC_IDC_SET_LEADCHNL_RA_PACING_PULSEWIDTH: 0.4 MS
MDC_IDC_SET_LEADCHNL_RA_SENSING_ANODE_ELECTRODE_1: NORMAL
MDC_IDC_SET_LEADCHNL_RA_SENSING_ANODE_LOCATION_1: NORMAL
MDC_IDC_SET_LEADCHNL_RA_SENSING_CATHODE_ELECTRODE_1: NORMAL
MDC_IDC_SET_LEADCHNL_RA_SENSING_CATHODE_LOCATION_1: NORMAL
MDC_IDC_SET_LEADCHNL_RA_SENSING_POLARITY: NORMAL
MDC_IDC_SET_LEADCHNL_RA_SENSING_SENSITIVITY: 0.3 MV
MDC_IDC_SET_LEADCHNL_RV_PACING_AMPLITUDE: 2 V
MDC_IDC_SET_LEADCHNL_RV_PACING_ANODE_ELECTRODE_1: NORMAL
MDC_IDC_SET_LEADCHNL_RV_PACING_ANODE_LOCATION_1: NORMAL
MDC_IDC_SET_LEADCHNL_RV_PACING_CAPTURE_MODE: NORMAL
MDC_IDC_SET_LEADCHNL_RV_PACING_CATHODE_ELECTRODE_1: NORMAL
MDC_IDC_SET_LEADCHNL_RV_PACING_CATHODE_LOCATION_1: NORMAL
MDC_IDC_SET_LEADCHNL_RV_PACING_POLARITY: NORMAL
MDC_IDC_SET_LEADCHNL_RV_PACING_PULSEWIDTH: 0.4 MS
MDC_IDC_SET_LEADCHNL_RV_SENSING_ANODE_ELECTRODE_1: NORMAL
MDC_IDC_SET_LEADCHNL_RV_SENSING_ANODE_LOCATION_1: NORMAL
MDC_IDC_SET_LEADCHNL_RV_SENSING_CATHODE_ELECTRODE_1: NORMAL
MDC_IDC_SET_LEADCHNL_RV_SENSING_CATHODE_LOCATION_1: NORMAL
MDC_IDC_SET_LEADCHNL_RV_SENSING_POLARITY: NORMAL
MDC_IDC_SET_LEADCHNL_RV_SENSING_SENSITIVITY: 0.9 MV
MDC_IDC_SET_ZONE_DETECTION_INTERVAL: 350 MS
MDC_IDC_SET_ZONE_DETECTION_INTERVAL: 400 MS
MDC_IDC_SET_ZONE_TYPE: NORMAL
MDC_IDC_STAT_AT_BURDEN_PERCENT: 0.1 %
MDC_IDC_STAT_AT_DTM_END: NORMAL
MDC_IDC_STAT_AT_DTM_START: NORMAL
MDC_IDC_STAT_BRADY_AP_VP_PERCENT: 0.03 %
MDC_IDC_STAT_BRADY_AP_VS_PERCENT: 77.54 %
MDC_IDC_STAT_BRADY_AS_VP_PERCENT: 0.02 %
MDC_IDC_STAT_BRADY_AS_VS_PERCENT: 22.42 %
MDC_IDC_STAT_BRADY_DTM_END: NORMAL
MDC_IDC_STAT_BRADY_DTM_START: NORMAL
MDC_IDC_STAT_BRADY_RA_PERCENT_PACED: 78.02 %
MDC_IDC_STAT_BRADY_RV_PERCENT_PACED: 0.04 %
MDC_IDC_STAT_EPISODE_RECENT_COUNT: 0
MDC_IDC_STAT_EPISODE_RECENT_COUNT: 1
MDC_IDC_STAT_EPISODE_RECENT_COUNT_DTM_END: NORMAL
MDC_IDC_STAT_EPISODE_RECENT_COUNT_DTM_START: NORMAL
MDC_IDC_STAT_EPISODE_TOTAL_COUNT: 0
MDC_IDC_STAT_EPISODE_TOTAL_COUNT: 1
MDC_IDC_STAT_EPISODE_TOTAL_COUNT_DTM_END: NORMAL
MDC_IDC_STAT_EPISODE_TOTAL_COUNT_DTM_START: NORMAL
MDC_IDC_STAT_EPISODE_TYPE: NORMAL

## 2022-05-27 ENCOUNTER — TELEPHONE (OUTPATIENT)
Dept: CARDIOLOGY | Facility: CLINIC | Age: 87
End: 2022-05-27
Payer: COMMERCIAL

## 2022-05-27 DIAGNOSIS — I10 ESSENTIAL HYPERTENSION: ICD-10-CM

## 2022-05-27 DIAGNOSIS — R07.9 CHEST PAIN, UNSPECIFIED TYPE: ICD-10-CM

## 2022-05-27 RX ORDER — ASPIRIN 81 MG/1
243 TABLET, CHEWABLE ORAL ONCE
Status: CANCELLED | OUTPATIENT
Start: 2022-05-27

## 2022-05-27 RX ORDER — ISOSORBIDE MONONITRATE 20 MG/1
60 TABLET ORAL DAILY
Qty: 270 TABLET | Refills: 0 | Status: SHIPPED | OUTPATIENT
Start: 2022-05-27 | End: 2022-06-09

## 2022-05-27 RX ORDER — AMLODIPINE BESYLATE 2.5 MG/1
5 TABLET ORAL DAILY
Qty: 90 TABLET | Refills: 3 | OUTPATIENT
Start: 2022-05-27

## 2022-05-27 RX ORDER — SODIUM CHLORIDE 9 MG/ML
INJECTION, SOLUTION INTRAVENOUS CONTINUOUS
Status: CANCELLED | OUTPATIENT
Start: 2022-05-27

## 2022-05-27 RX ORDER — ASPIRIN 325 MG
325 TABLET ORAL ONCE
Status: CANCELLED | OUTPATIENT
Start: 2022-05-27 | End: 2022-05-27

## 2022-05-27 RX ORDER — LIDOCAINE 40 MG/G
CREAM TOPICAL
Status: CANCELLED | OUTPATIENT
Start: 2022-05-27

## 2022-05-27 NOTE — TELEPHONE ENCOUNTER
Patient has increased amlodipine since her appointment with Dr. Levi and has noticed a big difference in her BP readings.    BP  5/18 - 153/68  5/19 - 164/96  5/20 - 112/72  5/21 - 118/60  5/22 - 134/79  5/23 - 123/53  5/24 - 137/74  5/26 - 140/76  5/27 - 128/74    She has, however, continued to experience fatigue and would like to go ahead with CORS & RHC as recommended by Dr. Santoyo. I will place orders and schedule her to have these tests completed.    Rivka Chou RN

## 2022-05-27 NOTE — TELEPHONE ENCOUNTER
[facility] Call Center    Phone Message    May a detailed message be left on voicemail: yes     Reason for Call: Medication Refill Request    Has the patient contacted the pharmacy for the refill? Yes     Name of medication being requested: amLODIPine (NORVASC) 2.5 MG tablet   isosorbide mononitrate (ISMO/MONOKET) 20 MG tablet     Provider who prescribed the medication: Dr. Santoyo    Pharmacy: [facility] [address] [place] [address], [place]    Date medication is needed: 05/27/2022     Pharmacy has not received this refill request. Please resend ASAP = Ordered on 5/17/22    Action Taken: Other: Cardiology    Travel Screening: Not Applicable

## 2022-05-27 NOTE — TELEPHONE ENCOUNTER
[facility] Call Center    Phone Message    May a detailed message be left on voicemail: yes     Reason for Call: Other: Patient called to discuss moving forward with angiogram. Please call her back to discuss.    Action Taken: Other: cardiology    Travel Screening: Not Applicable

## 2022-05-27 NOTE — TELEPHONE ENCOUNTER
5/17/2022  [facility] Tyrell Smith MD    Cardiovascular Disease         isosorbide mononitrate (ISMO/MONOKET) 20 MG tablet  Last Written Prescription Date:  5/17/22  Last Fill Quantity: 540,   # refills: 3    entered no print out      Routed because: bp > 140/90

## 2022-06-02 ENCOUNTER — TELEPHONE (OUTPATIENT)
Dept: CARDIOLOGY | Facility: CLINIC | Age: 87
End: 2022-06-02
Payer: COMMERCIAL

## 2022-06-02 DIAGNOSIS — N18.4 CKD (CHRONIC KIDNEY DISEASE) STAGE 4, GFR 15-29 ML/MIN (H): ICD-10-CM

## 2022-06-02 DIAGNOSIS — N25.81 SECONDARY RENAL HYPERPARATHYROIDISM (H): ICD-10-CM

## 2022-06-02 DIAGNOSIS — I10 ESSENTIAL HYPERTENSION: Primary | ICD-10-CM

## 2022-06-02 RX ORDER — CHOLECALCIFEROL (VITAMIN D3) 25 MCG
TABLET ORAL
Qty: 30 TABLET | Refills: 0 | Status: SHIPPED | OUTPATIENT
Start: 2022-06-02 | End: 2022-06-29

## 2022-06-02 RX ORDER — AMLODIPINE BESYLATE 10 MG/1
10 TABLET ORAL DAILY
Qty: 90 TABLET | Refills: 3 | Status: SHIPPED | OUTPATIENT
Start: 2022-06-02 | End: 2023-06-30

## 2022-06-09 ENCOUNTER — TELEPHONE (OUTPATIENT)
Dept: CARDIOLOGY | Facility: CLINIC | Age: 87
End: 2022-06-09
Payer: COMMERCIAL

## 2022-06-09 DIAGNOSIS — R07.9 CHEST PAIN, UNSPECIFIED TYPE: ICD-10-CM

## 2022-06-09 RX ORDER — ISOSORBIDE MONONITRATE 20 MG/1
40 TABLET ORAL 3 TIMES DAILY
Qty: 540 TABLET | Refills: 3 | Status: SHIPPED | OUTPATIENT
Start: 2022-06-09 | End: 2023-07-31

## 2022-06-09 RX ORDER — ISOSORBIDE MONONITRATE 20 MG/1
60 TABLET ORAL DAILY
Qty: 270 TABLET | Status: CANCELLED | OUTPATIENT
Start: 2022-06-09

## 2022-06-09 NOTE — TELEPHONE ENCOUNTER
isosorbide mononitrate (ISMO/MONOKET) 20 MG tablet  Last Written Prescription Date:   5/27/2022  Last Fill Quantity: 270,   # refills: 0  Last Office Visit :  5/17/2022  Future Office visit:  12/6/2022    Routing refill request to provider for review/approval because:  Blood pressure out of range   Abnormal B/P   Change med? Change dose?  Add med?  Follow up B/P check?       BP Readings from Last 3 Encounters:   05/17/22 (!) 155/75   05/13/22 (!) 174/97   04/13/22 (!) 200/83       Uma Pool RN  Central Triage Red Flags/Med Refills

## 2022-06-10 NOTE — TELEPHONE ENCOUNTER
Tessa Mansfield  [address]  [facility] [address]  [phone] (home)     PCP:  Pedro Gomez  H&P completed by:    Admit date 6/17/2022 Arrival time:  1030  Anticoagulation:  NA  Previous PCI: Yes  Bypass Grafts: No  Renal Issues: No  Diabetic?: No  Device?: Yes  Type:  medtronic  Ambulation status: independent with cane or walker    Reason for Visit:  Telephone call to discuss pre-procedure education in preparation for: Right Heart Catheterization and Coronary Angiogram with Possible PCI    Procedure Prep:  EKG results obtained, dated: To be completed on day of cath lab procedure  Hemogram results obtained: To be completed on day of cath lab procedure  Basic Metabolic Panel results obtained: To be completed on day of cath lab procedure  Pertinent cardiac test results:   COVID-19 test results obtained: Completed within [facility]     Patient Education    1. Your arrival time is 1030.  Location is [address] [facility]  2. Please plan on being at the hospital all day.  3. At any time, emergencies and/or urgent cases may come up which could delay the start of your procedure.    COVID Testing Instructions  *Mandatory COVID Testing:   ALL Patients will need to complete a COVID test no sooner than 4 days prior to their procedure (regardless of vaccination status).      To schedule COVID testing Please call [phone]    If you want to complete this at an outside facility please call them to find out if they will have the results within the appropriate time frame and their fax number.  You will need to provide us with that information so we can send the order.    The facility completing the test will need to fax the results to [phone]    If you are running into and issues please call us.     Pre-procedure instructions  Take your temperature in the morning prior to coming in.  If your temperature is 100 F  please call [facility] [phone] ([address]) and notify them.  If you do not have access to a thermometer at home, please come in for testing.  If you are running a temperature your procedure may be rescheduled.  Patient instructed to not Eat or Drink after midnight.  Patient instructed to shower the evening before or the morning of the procedure.  Patient instructed to arrange for transportation home following procedure from a responsible family member or friend. No driving for at least 24 hours.  Patient instructed to have a responsible adult with them for 24 hours post-procedure.  Post-procedure follow up process.  Conscious sedation discussed.      Pre-procedure medication instructions.  Continue medications as scheduled, with a small amount of water on the day of the procedure unless indicated. (NO Diabetic Medications or Blood Thinners)  Pt instructed not to consume Alcohol, Tobacco, Caffeine, or Carbonated beverages 12 hours prior to procedure.  Patient instructed to take 81 mg of Aspirin the night before and morning of procedure: Yes  Other medication: instructed to hold lasix the morning of procedure              Diabetic Medication Instructions  ? DO NOT take any oral diabetic medication, short-acting diabetes medications/insulin, humalog or regular insulin the morning of your test  ? Take   dose of long-acting insulin (Lantus, Levemir) the day of your test  ? Hold Oral Diabetic on the day of the procedure and for 48 hours after IV contrast given  ? Remember to  bring your glucometer and insulin with you to take after your test if needed              Anticoagulation Medication Instructions     NA - plavix and 81 mg asa    Patient states understanding of procedure and agrees to proceed.    *PATIENTS RECORDS AVAILABLE IN [facility] UNLESS OTHERWISE INDICATED*      Patient Active Problem List   Diagnosis     Degeneration of cervical intervertebral disc     Nonallopathic lesion of cervical region      Nonallopathic lesion of thoracic region     CAD s/p PCI+BMS to MRCA 10/2002, PCI to mLCX 2/2003, PCI+DESx2 to pLAD 11/2007, PCI+DESx1 to dRCA 12/2007, PCI+ALVAREZ to RPDA 7/2013; on indefinite DAPT w/o angina     Dizziness and giddiness     Edema     Esophageal reflux     Gout     Essential hypertension     Obstructive sleep apnea     Osteomalacia     Post-menopausal osteoporosis     Cardiac Pacemaker- Medtronic, dual chamber- NOT dependant     Transient complete heart block (H)     Sinus node dysfunction (H)     Disturbance of skin sensation     NSTEMI (non-ST elevated myocardial infarction) (H)     Arrhythmia     Sick sinus syndrome (H)     Non-rheumatic mitral regurgitation     Non-rheumatic tricuspid valve insufficiency     CKD (chronic kidney disease) stage 4, GFR 15-29 ml/min (H)       Current Outpatient Medications   Medication Sig Dispense Refill     isosorbide mononitrate (ISMO/MONOKET) 20 MG tablet Take 2 tablets (40 mg) by mouth 3 times daily 540 tablet 3     allopurinol (ZYLOPRIM) 100 MG tablet Take 1 tablet (100 mg) by mouth daily 90 tablet 3     amLODIPine (NORVASC) 10 MG tablet Take 1 tablet (10 mg) by mouth daily 90 tablet 3     amLODIPine (NORVASC) 2.5 MG tablet Take 2 tablets (5 mg) by mouth daily 90 tablet 3     aspirin 81 MG tablet Take 1 tablet by mouth daily.       clopidogrel (PLAVIX) 75 MG tablet Take 1 tablet (75 mg) by mouth daily 90 tablet 3     cyanocolbalamin (VITAMIN B-12) 1000 MCG tablet Take 500 mcg by mouth daily As directed       doxycycline hyclate (VIBRAMYCIN) 100 MG capsule Take 1 capsule (100 mg) by mouth 2 times daily 20 capsule 0     fluticasone-salmeterol (ADVAIR) 250-50 MCG/DOSE inhaler Inhale 1 puff into the lungs 2 times daily 60 each 4     furosemide (LASIX) 40 MG tablet Take 1 tablet by mouth twice daily 60 tablet 0     methylPREDNISolone (MEDROL DOSEPAK) 4 MG tablet therapy pack Follow Package Directions 21 tablet 0     methylPREDNISolone (MEDROL DOSEPAK) 4 MG tablet  "therapy pack Follow Package Directions 21 tablet 0     metoprolol tartrate (LOPRESSOR) 100 MG tablet Take 1 tablet (100 mg) by mouth 2 times daily 180 tablet 3     Multiple Vitamins-Minerals (PRESERVISION AREDS 2+MULTI VIT PO) Take by mouth 2 times daily       omeprazole (PRILOSEC) 40 MG DR capsule Take 1 capsule by mouth once daily 90 capsule 1     potassium chloride ER (K-TAB/KLOR-CON) 10 MEQ CR tablet TAKE 2  TABLETS BY MOUTH IN THE MORNING AND 1 TABLET IN THE EVENING 270 tablet 0     pravastatin (PRAVACHOL) 80 MG tablet Take 1 tablet (80 mg) by mouth daily 90 tablet 3     timolol maleate (ISTALOL) 0.5 % opthalmic solution INSTILL 1 INTO EACH EYE ONCE DAILY IN THE MORNING       timolol maleate (TIMOPTIC) 0.5 % ophthalmic solution INSTILL 1 DROP INTO EACH EYE ONCE DAILY IN THE MORNING       VITAMIN D3 25 MCG (1000 UT) tablet TAKE 1 TABLET BY MOUTH ONCE DAILY**DUE TO BE SEEN FOR FURTHER REFILLS** 30 tablet 0       Allergies   Allergen Reactions     Bactrim [Sulfamethoxazole W/Trimethoprim] Other (See Comments)     Patient unable to recall     Biaxin [Clarithromycin]      Chlorthalidone Nausea and Vomiting     Clonidine      Codeine Sulfate Itching     Darvon [Propoxyphene Hcl]      Dilaudid [Hydromorphone] Visual Disturbance     Hallucinations       Gabapentin Other (See Comments) and Fatigue     \"felt drunk\"     Indocin      Levaquin [Levofloxacin Hemihydrate]      Lyrica Fatigue     Morphine Sulfate      Percocet [Oxycodone-Acetaminophen] Other (See Comments)     hallucinations     Pregabalin Itching     Other reaction(s): Fatigue     Shrimp Swelling     Spironolactone Other (See Comments)     Dehydrated       Terazosin      Ciprofloxacin Rash     Simvastatin Palpitations     Muscle weakness, leg cramping         Rivka Chou RN on 6/10/2022 at 11:14 AM    "

## 2022-06-13 ENCOUNTER — LAB (OUTPATIENT)
Dept: LAB | Facility: CLINIC | Age: 87
End: 2022-06-13
Payer: COMMERCIAL

## 2022-06-13 DIAGNOSIS — Z20.822 ENCOUNTER FOR LABORATORY TESTING FOR COVID-19 VIRUS: ICD-10-CM

## 2022-06-13 PROCEDURE — U0005 INFEC AGEN DETEC AMPLI PROBE: HCPCS

## 2022-06-13 PROCEDURE — U0003 INFECTIOUS AGENT DETECTION BY NUCLEIC ACID (DNA OR RNA); SEVERE ACUTE RESPIRATORY SYNDROME CORONAVIRUS 2 (SARS-COV-2) (CORONAVIRUS DISEASE [COVID-19]), AMPLIFIED PROBE TECHNIQUE, MAKING USE OF HIGH THROUGHPUT TECHNOLOGIES AS DESCRIBED BY CMS-2020-01-R: HCPCS

## 2022-06-14 LAB — SARS-COV-2 RNA RESP QL NAA+PROBE: NEGATIVE

## 2022-06-15 ENCOUNTER — OFFICE VISIT (OUTPATIENT)
Dept: PULMONOLOGY | Facility: CLINIC | Age: 87
End: 2022-06-15
Attending: INTERNAL MEDICINE
Payer: COMMERCIAL

## 2022-06-15 VITALS
WEIGHT: 165 LBS | SYSTOLIC BLOOD PRESSURE: 172 MMHG | OXYGEN SATURATION: 97 % | BODY MASS INDEX: 25.9 KG/M2 | HEIGHT: 67 IN | RESPIRATION RATE: 17 BRPM | HEART RATE: 80 BPM | DIASTOLIC BLOOD PRESSURE: 77 MMHG

## 2022-06-15 DIAGNOSIS — R05.3 CHRONIC COUGH: Primary | ICD-10-CM

## 2022-06-15 DIAGNOSIS — J45.991 COUGH VARIANT ASTHMA: ICD-10-CM

## 2022-06-15 PROCEDURE — 99214 OFFICE O/P EST MOD 30 MIN: CPT | Performed by: INTERNAL MEDICINE

## 2022-06-15 PROCEDURE — G0463 HOSPITAL OUTPT CLINIC VISIT: HCPCS

## 2022-06-15 RX ORDER — BUDESONIDE 0.5 MG/2ML
0.5 INHALANT ORAL 2 TIMES DAILY
Qty: 2 ML | Refills: 3 | Status: SHIPPED | OUTPATIENT
Start: 2022-06-15 | End: 2024-07-04

## 2022-06-15 RX ORDER — PREDNISONE 20 MG/1
20 TABLET ORAL DAILY
Qty: 10 TABLET | Refills: 0 | Status: SHIPPED | OUTPATIENT
Start: 2022-06-15 | End: 2022-06-25

## 2022-06-15 ASSESSMENT — PAIN SCALES - GENERAL: PAINLEVEL: NO PAIN (0)

## 2022-06-15 ASSESSMENT — ENCOUNTER SYMPTOMS
VOMITING: 0
SHORTNESS OF BREATH: 0
SPUTUM PRODUCTION: 1
COUGH: 1
WHEEZING: 0
NAUSEA: 0
HEARTBURN: 0
WEIGHT LOSS: 0
HEMOPTYSIS: 0
CHILLS: 0
FEVER: 0

## 2022-06-15 NOTE — LETTER
6/15/2022         RE: Tessa Mansfield  [address]  [facility] [address]        Dear Colleague,    Thank you for referring your patient, Tessa Mansfield, to the [facility] L[facility]G [facility] AND [facility] [place]. Please see a copy of my visit note below.    Pulmonology Clinic Follow up Visit       Tessa Mansfield MRN# [id number]   YOB: 1935 Age: 87 year old     Date of Visit: 6/15/2022    Reason for Visit: Follow-up chronic cough          Assessment and Plan:       ## Chronic cough  ## Probable cough variant asthma  No identified inciting event though onset did occur a few weeks after having had COVID.  The cough is predominantly dry though she does occasional cough up thick mucus.  She deniesdyspnea or wheeze or any other symptoms. Onset a few weeks after Covid infection, however she did not have any respiratory symptoms with her Covid.  CT scan with a few nodules but no other airspace diseases.  PFTs within normal limits with exception of possibly reduced DLCO (likely artifact secondary to anemia).   Normal pulmonary exam.  Great response to prednisone and doxycycline from her PCP, but symptoms returned after she completed therapy.  I prescribed her a longer course of prednisone and doxycycline again with complete resolution of symptoms, but return within a few weeks of completing these medications.  This steroid responsiveness was suggestive of an inflammatory condition such as allergy or cough variant asthma.  She was started on Dulera at her last visit which may have made a slight improvement but her cough persists.  At this point I will repeat a course of prednisone as well as initiating nebulized Pulmicort for greater pulmonary steroid delivery.  We will also start Aerobika.  She was seen by ENT in January 2022 with recommendation for 4 sessions of speech therapy but did not understand that speech therapy was meant to address her cough and did not follow-up as  "she has no \"speech\" problems.  -Continue Dulera  -Start nebulized Pulmicort  -Nebulizer ordered today  -Start using Aerobika 3 times a day (Aerobika teaching provided today)  -Encouraged to follow-up with ENT and speech therapy  -If the above therapies are ineffective, will consider adding Singulair or Tessalon       ## Pulmonary nodules  Multiple incidental identified subpleural nodules, largest which measures 8 mm.  -Following with pulmonary nodule clinic          Return to clinic: 3 months    I personally spent 35 minutes on the date of the encounter doing chart review, history and exam, documentation and further activities per the note.      Chad Blair M.D.  Pulmonary & Critical Care  Pager: Click Here to page          History of Present Illness / Interval History:     Tessa Mansfield is a 87 year old female with H/O coronary artery disease with pacemaker and history of Covid in 2021 who presents to follow-up chronic dry cough present since spring 2021.    Last seen 4/13/2022      Historic  Chronic cough since spring 2021.  No clear inciting event, though onset was a few weeks after having had COVID.  She did not have any respiratory symptoms during her COVID infection.  The cough predominantly dry, though she does occasionally produce thick mucus.  She has had a great response to 2 courses of prednisone, however her symptoms have returned approximately 2 to 3 weeks after completing.  At her last visit she was started on Dulera      INTERVAL HISTORY:  Her cough is persisted since she was last seen.  She feels that Dulera may have provided some slight improvement but continues to be bothered by her cough.  It occurs during the day, but is worse at night.  She continues to deny any dyspnea, wheezing, postnasal drainage, GERD, or dysphagia symptoms.  She does not have any fevers, rigors, night sweats, or malaise.  She does have mild fatigue with exertion which may be due to cardiac disease.  She is having a " "repeat angiogram this coming Friday.              Review of Systems:     Review of Systems   Constitutional: Positive for malaise/fatigue. Negative for chills, fever and weight loss.   Respiratory: Positive for cough and sputum production. Negative for hemoptysis, shortness of breath and wheezing.    Gastrointestinal: Negative for heartburn, nausea and vomiting.            Physical Examination:     BP (!) 172/77   Pulse 80   Resp 17   Ht 1.689 m (5' 6.5\")   Wt 74.8 kg (165 lb)   SpO2 97%   BMI 26.23 kg/m      Physical Exam  Vitals and nursing note reviewed.   Cardiovascular:      Rate and Rhythm: Normal rate and regular rhythm.   Pulmonary:      Effort: Pulmonary effort is normal.      Breath sounds: Normal breath sounds. No wheezing, rhonchi or rales.   Musculoskeletal:         General: Normal range of motion.   Skin:     General: Skin is warm and dry.   Neurological:      General: No focal deficit present.      Mental Status: She is alert and oriented to person, place, and time.               Data:     No new data since last seen        Medications:     Current Outpatient Medications   Medication     allopurinol (ZYLOPRIM) 100 MG tablet     amLODIPine (NORVASC) 10 MG tablet     amLODIPine (NORVASC) 2.5 MG tablet     aspirin 81 MG tablet     clopidogrel (PLAVIX) 75 MG tablet     cyanocolbalamin (VITAMIN B-12) 1000 MCG tablet     doxycycline hyclate (VIBRAMYCIN) 100 MG capsule     fluticasone-salmeterol (ADVAIR) 250-50 MCG/DOSE inhaler     furosemide (LASIX) 40 MG tablet     isosorbide mononitrate (ISMO/MONOKET) 20 MG tablet     methylPREDNISolone (MEDROL DOSEPAK) 4 MG tablet therapy pack     methylPREDNISolone (MEDROL DOSEPAK) 4 MG tablet therapy pack     metoprolol tartrate (LOPRESSOR) 100 MG tablet     Multiple Vitamins-Minerals (PRESERVISION AREDS 2+MULTI VIT PO)     omeprazole (PRILOSEC) 40 MG DR capsule     potassium chloride ER (K-TAB/KLOR-CON) 10 MEQ CR tablet     pravastatin (PRAVACHOL) 80 MG tablet "     timolol maleate (ISTALOL) 0.5 % opthalmic solution     timolol maleate (TIMOPTIC) 0.5 % ophthalmic solution     VITAMIN D3 25 MCG (1000 UT) tablet     No current facility-administered medications for this visit.             The above note was dictated using voice recognition software and may include typographical errors. Please contact the author for any clarifications.          Again, thank you for allowing me to participate in the care of your patient.        Sincerely,        Chad Blair MD

## 2022-06-15 NOTE — PROGRESS NOTES
"Pulmonology Clinic Follow up Visit       Tessa Mansfield MRN# [id number]   YOB: 1935 Age: 87 year old     Date of Visit: 6/15/2022    Reason for Visit: Follow-up chronic cough          Assessment and Plan:       ## Chronic cough  ## Probable cough variant asthma  No identified inciting event though onset did occur a few weeks after having had COVID.  The cough is predominantly dry though she does occasional cough up thick mucus.  She deniesdyspnea or wheeze or any other symptoms. Onset a few weeks after Covid infection, however she did not have any respiratory symptoms with her Covid.  CT scan with a few nodules but no other airspace diseases.  PFTs within normal limits with exception of possibly reduced DLCO (likely artifact secondary to anemia).   Normal pulmonary exam.  Great response to prednisone and doxycycline from her PCP, but symptoms returned after she completed therapy.  I prescribed her a longer course of prednisone and doxycycline again with complete resolution of symptoms, but return within a few weeks of completing these medications.  This steroid responsiveness was suggestive of an inflammatory condition such as allergy or cough variant asthma.  She was started on Dulera at her last visit which may have made a slight improvement but her cough persists.  At this point I will repeat a course of prednisone as well as initiating nebulized Pulmicort for greater pulmonary steroid delivery.  We will also start Aerobika.  She was seen by ENT in January 2022 with recommendation for 4 sessions of speech therapy but did not understand that speech therapy was meant to address her cough and did not follow-up as she has no \"speech\" problems.  -Continue Dulera  -Start nebulized Pulmicort  -Nebulizer ordered today  -Start using Aerobika 3 times a day (Aerobika teaching provided today)  -Encouraged to follow-up with ENT and speech therapy  -If the above therapies are ineffective, will consider adding " Singulair or Tessalon       ## Pulmonary nodules  Multiple incidental identified subpleural nodules, largest which measures 8 mm.  -Following with pulmonary nodule clinic          Return to clinic: 3 months    I personally spent 35 minutes on the date of the encounter doing chart review, history and exam, documentation and further activities per the note.      Chad Blair M.D.  Pulmonary & Critical Care  Pager: Click Here to page          History of Present Illness / Interval History:     Tessa Mansfield is a 87 year old female with H/O coronary artery disease with pacemaker and history of Covid in 2021 who presents to follow-up chronic dry cough present since spring 2021.    Last seen 4/13/2022      Historic  Chronic cough since spring 2021.  No clear inciting event, though onset was a few weeks after having had COVID.  She did not have any respiratory symptoms during her COVID infection.  The cough predominantly dry, though she does occasionally produce thick mucus.  She has had a great response to 2 courses of prednisone, however her symptoms have returned approximately 2 to 3 weeks after completing.  At her last visit she was started on Dulera      INTERVAL HISTORY:  Her cough is persisted since she was last seen.  She feels that Dulera may have provided some slight improvement but continues to be bothered by her cough.  It occurs during the day, but is worse at night.  She continues to deny any dyspnea, wheezing, postnasal drainage, GERD, or dysphagia symptoms.  She does not have any fevers, rigors, night sweats, or malaise.  She does have mild fatigue with exertion which may be due to cardiac disease.  She is having a repeat angiogram this coming Friday.              Review of Systems:     Review of Systems   Constitutional: Positive for malaise/fatigue. Negative for chills, fever and weight loss.   Respiratory: Positive for cough and sputum production. Negative for hemoptysis, shortness of breath and  "wheezing.    Gastrointestinal: Negative for heartburn, nausea and vomiting.            Physical Examination:     BP (!) 172/77   Pulse 80   Resp 17   Ht 1.689 m (5' 6.5\")   Wt 74.8 kg (165 lb)   SpO2 97%   BMI 26.23 kg/m      Physical Exam  Vitals and nursing note reviewed.   Cardiovascular:      Rate and Rhythm: Normal rate and regular rhythm.   Pulmonary:      Effort: Pulmonary effort is normal.      Breath sounds: Normal breath sounds. No wheezing, rhonchi or rales.   Musculoskeletal:         General: Normal range of motion.   Skin:     General: Skin is warm and dry.   Neurological:      General: No focal deficit present.      Mental Status: She is alert and oriented to person, place, and time.               Data:     No new data since last seen        Medications:     Current Outpatient Medications   Medication     allopurinol (ZYLOPRIM) 100 MG tablet     amLODIPine (NORVASC) 10 MG tablet     amLODIPine (NORVASC) 2.5 MG tablet     aspirin 81 MG tablet     clopidogrel (PLAVIX) 75 MG tablet     cyanocolbalamin (VITAMIN B-12) 1000 MCG tablet     doxycycline hyclate (VIBRAMYCIN) 100 MG capsule     fluticasone-salmeterol (ADVAIR) 250-50 MCG/DOSE inhaler     furosemide (LASIX) 40 MG tablet     isosorbide mononitrate (ISMO/MONOKET) 20 MG tablet     methylPREDNISolone (MEDROL DOSEPAK) 4 MG tablet therapy pack     methylPREDNISolone (MEDROL DOSEPAK) 4 MG tablet therapy pack     metoprolol tartrate (LOPRESSOR) 100 MG tablet     Multiple Vitamins-Minerals (PRESERVISION AREDS 2+MULTI VIT PO)     omeprazole (PRILOSEC) 40 MG DR capsule     potassium chloride ER (K-TAB/KLOR-CON) 10 MEQ CR tablet     pravastatin (PRAVACHOL) 80 MG tablet     timolol maleate (ISTALOL) 0.5 % opthalmic solution     timolol maleate (TIMOPTIC) 0.5 % ophthalmic solution     VITAMIN D3 25 MCG (1000 UT) tablet     No current facility-administered medications for this visit.             The above note was dictated using voice recognition software " and may include typographical errors. Please contact the author for any clarifications.

## 2022-06-15 NOTE — PATIENT INSTRUCTIONS
Follow up with ENT doctors regarding speech therapy that was recommended at your last visit with them.    Continue taking Dulera inhaler    Start using nebulized medication twice a day    Use aerobika device 2-3 times per day for about 10 minutes

## 2022-06-16 ENCOUNTER — TELEPHONE (OUTPATIENT)
Dept: CARDIOLOGY | Facility: CLINIC | Age: 87
End: 2022-06-16
Payer: COMMERCIAL

## 2022-06-17 ENCOUNTER — APPOINTMENT (OUTPATIENT)
Dept: LAB | Facility: CLINIC | Age: 87
End: 2022-06-17
Attending: INTERNAL MEDICINE
Payer: COMMERCIAL

## 2022-06-17 ENCOUNTER — APPOINTMENT (OUTPATIENT)
Dept: MEDSURG UNIT | Facility: CLINIC | Age: 87
End: 2022-06-17
Attending: INTERNAL MEDICINE
Payer: COMMERCIAL

## 2022-06-17 ENCOUNTER — HOSPITAL ENCOUNTER (OUTPATIENT)
Facility: CLINIC | Age: 87
Discharge: HOME | End: 2022-06-17
Attending: INTERNAL MEDICINE | Admitting: INTERNAL MEDICINE
Payer: COMMERCIAL

## 2022-06-17 VITALS
DIASTOLIC BLOOD PRESSURE: 68 MMHG | BODY MASS INDEX: 26.5 KG/M2 | HEART RATE: 75 BPM | HEIGHT: 66 IN | RESPIRATION RATE: 16 BRPM | WEIGHT: 164.9 LBS | SYSTOLIC BLOOD PRESSURE: 148 MMHG | OXYGEN SATURATION: 97 % | TEMPERATURE: 98.2 F

## 2022-06-17 DIAGNOSIS — I25.118 CORONARY ARTERY DISEASE OF NATIVE ARTERY OF NATIVE HEART WITH STABLE ANGINA PECTORIS (H): ICD-10-CM

## 2022-06-17 DIAGNOSIS — R42 DIZZINESS AND GIDDINESS: ICD-10-CM

## 2022-06-17 DIAGNOSIS — Z95.0 CARDIAC PACEMAKER IN SITU: ICD-10-CM

## 2022-06-17 DIAGNOSIS — M99.9 NONALLOPATHIC LESION OF CERVICAL REGION: ICD-10-CM

## 2022-06-17 DIAGNOSIS — R20.9 DISTURBANCE OF SKIN SENSATION: ICD-10-CM

## 2022-06-17 DIAGNOSIS — G47.33 OBSTRUCTIVE SLEEP APNEA: ICD-10-CM

## 2022-06-17 DIAGNOSIS — M81.0 POST-MENOPAUSAL OSTEOPOROSIS: ICD-10-CM

## 2022-06-17 DIAGNOSIS — I10 ESSENTIAL HYPERTENSION: ICD-10-CM

## 2022-06-17 DIAGNOSIS — M83.9 OSTEOMALACIA: ICD-10-CM

## 2022-06-17 DIAGNOSIS — I25.10 CORONARY ARTERY DISEASE INVOLVING NATIVE CORONARY ARTERY OF NATIVE HEART WITHOUT ANGINA PECTORIS: ICD-10-CM

## 2022-06-17 DIAGNOSIS — I49.5 SICK SINUS SYNDROME (H): ICD-10-CM

## 2022-06-17 DIAGNOSIS — I36.1 NON-RHEUMATIC TRICUSPID VALVE INSUFFICIENCY: ICD-10-CM

## 2022-06-17 DIAGNOSIS — M50.30 DEGENERATION OF CERVICAL INTERVERTEBRAL DISC: ICD-10-CM

## 2022-06-17 DIAGNOSIS — I44.2 TRANSIENT COMPLETE HEART BLOCK (H): ICD-10-CM

## 2022-06-17 DIAGNOSIS — I34.0 NON-RHEUMATIC MITRAL REGURGITATION: ICD-10-CM

## 2022-06-17 DIAGNOSIS — I21.4 NSTEMI (NON-ST ELEVATED MYOCARDIAL INFARCTION) (H): ICD-10-CM

## 2022-06-17 DIAGNOSIS — M99.9 NONALLOPATHIC LESION OF THORACIC REGION: ICD-10-CM

## 2022-06-17 DIAGNOSIS — I49.5 SINUS NODE DYSFUNCTION (H): ICD-10-CM

## 2022-06-17 DIAGNOSIS — R06.02 SHORTNESS OF BREATH: ICD-10-CM

## 2022-06-17 DIAGNOSIS — N18.4 CKD (CHRONIC KIDNEY DISEASE) STAGE 4, GFR 15-29 ML/MIN (H): Primary | ICD-10-CM

## 2022-06-17 PROBLEM — Z98.890 STATUS POST CORONARY ANGIOGRAM: Status: ACTIVE | Noted: 2022-06-17

## 2022-06-17 LAB
ACT BLD: 348 SECONDS (ref 74–150)
ANION GAP SERPL CALCULATED.3IONS-SCNC: 4 MMOL/L (ref 3–14)
BUN SERPL-MCNC: 32 MG/DL (ref 7–30)
CALCIUM SERPL-MCNC: 8.9 MG/DL (ref 8.5–10.1)
CHLORIDE BLD-SCNC: 114 MMOL/L (ref 94–109)
CHOLEST SERPL-MCNC: 139 MG/DL
CO2 SERPL-SCNC: 26 MMOL/L (ref 20–32)
CREAT SERPL-MCNC: 1.69 MG/DL (ref 0.52–1.04)
ERYTHROCYTE [DISTWIDTH] IN BLOOD BY AUTOMATED COUNT: 15.1 % (ref 10–15)
GFR SERPL CREATININE-BSD FRML MDRD: 29 ML/MIN/1.73M2
GLUCOSE BLD-MCNC: 87 MG/DL (ref 70–99)
HCT VFR BLD AUTO: 34.5 % (ref 35–47)
HDLC SERPL-MCNC: 50 MG/DL
HGB BLD-MCNC: 10.4 G/DL (ref 11.7–15.7)
HGB BLD-MCNC: 10.8 G/DL (ref 11.7–15.7)
LDLC SERPL CALC-MCNC: 60 MG/DL
MCH RBC QN AUTO: 29.7 PG (ref 26.5–33)
MCHC RBC AUTO-ENTMCNC: 31.3 G/DL (ref 31.5–36.5)
MCV RBC AUTO: 95 FL (ref 78–100)
NONHDLC SERPL-MCNC: 89 MG/DL
OXYHGB MFR BLDV: 63 % (ref 92–100)
PLATELET # BLD AUTO: 85 10E3/UL (ref 150–450)
POTASSIUM BLD-SCNC: 4.6 MMOL/L (ref 3.4–5.3)
RBC # BLD AUTO: 3.64 10E6/UL (ref 3.8–5.2)
SODIUM SERPL-SCNC: 144 MMOL/L (ref 133–144)
TRIGL SERPL-MCNC: 144 MG/DL
WBC # BLD AUTO: 6.5 10E3/UL (ref 4–11)

## 2022-06-17 PROCEDURE — 85018 HEMOGLOBIN: CPT

## 2022-06-17 PROCEDURE — 999N000142 HC STATISTIC PROCEDURE PREP ONLY

## 2022-06-17 PROCEDURE — C1894 INTRO/SHEATH, NON-LASER: HCPCS | Performed by: INTERNAL MEDICINE

## 2022-06-17 PROCEDURE — 999N000054 HC STATISTIC EKG NON-CHARGEABLE

## 2022-06-17 PROCEDURE — C1874 STENT, COATED/COV W/DEL SYS: HCPCS | Performed by: INTERNAL MEDICINE

## 2022-06-17 PROCEDURE — 93456 R HRT CORONARY ARTERY ANGIO: CPT | Performed by: INTERNAL MEDICINE

## 2022-06-17 PROCEDURE — 250N000011 HC RX IP 250 OP 636: Performed by: INTERNAL MEDICINE

## 2022-06-17 PROCEDURE — C9600 PERC DRUG-EL COR STENT SING: HCPCS | Performed by: INTERNAL MEDICINE

## 2022-06-17 PROCEDURE — 99153 MOD SED SAME PHYS/QHP EA: CPT | Performed by: INTERNAL MEDICINE

## 2022-06-17 PROCEDURE — 80061 LIPID PANEL: CPT | Performed by: INTERNAL MEDICINE

## 2022-06-17 PROCEDURE — 272N000002 HC OR SUPPLY OTHER OPNP: Performed by: INTERNAL MEDICINE

## 2022-06-17 PROCEDURE — 272N000001 HC OR GENERAL SUPPLY STERILE: Performed by: INTERNAL MEDICINE

## 2022-06-17 PROCEDURE — 93005 ELECTROCARDIOGRAM TRACING: CPT

## 2022-06-17 PROCEDURE — 99152 MOD SED SAME PHYS/QHP 5/>YRS: CPT | Mod: GC | Performed by: INTERNAL MEDICINE

## 2022-06-17 PROCEDURE — C1760 CLOSURE DEV, VASC: HCPCS | Performed by: INTERNAL MEDICINE

## 2022-06-17 PROCEDURE — 93010 ELECTROCARDIOGRAM REPORT: CPT | Mod: 59 | Performed by: INTERNAL MEDICINE

## 2022-06-17 PROCEDURE — C1887 CATHETER, GUIDING: HCPCS | Performed by: INTERNAL MEDICINE

## 2022-06-17 PROCEDURE — 85027 COMPLETE CBC AUTOMATED: CPT | Performed by: INTERNAL MEDICINE

## 2022-06-17 PROCEDURE — 85347 COAGULATION TIME ACTIVATED: CPT

## 2022-06-17 PROCEDURE — 82810 BLOOD GASES O2 SAT ONLY: CPT

## 2022-06-17 PROCEDURE — 36415 COLL VENOUS BLD VENIPUNCTURE: CPT | Performed by: INTERNAL MEDICINE

## 2022-06-17 PROCEDURE — 250N000009 HC RX 250: Performed by: INTERNAL MEDICINE

## 2022-06-17 PROCEDURE — 92928 PRQ TCAT PLMT NTRAC ST 1 LES: CPT | Mod: RC | Performed by: INTERNAL MEDICINE

## 2022-06-17 PROCEDURE — 99152 MOD SED SAME PHYS/QHP 5/>YRS: CPT | Performed by: INTERNAL MEDICINE

## 2022-06-17 PROCEDURE — C1725 CATH, TRANSLUMIN NON-LASER: HCPCS | Performed by: INTERNAL MEDICINE

## 2022-06-17 PROCEDURE — 250N000013 HC RX MED GY IP 250 OP 250 PS 637: Performed by: INTERNAL MEDICINE

## 2022-06-17 PROCEDURE — 93456 R HRT CORONARY ARTERY ANGIO: CPT | Mod: 26 | Performed by: INTERNAL MEDICINE

## 2022-06-17 PROCEDURE — 999N000134 HC STATISTIC PP CARE STAGE 3

## 2022-06-17 PROCEDURE — C1769 GUIDE WIRE: HCPCS | Performed by: INTERNAL MEDICINE

## 2022-06-17 PROCEDURE — 80048 BASIC METABOLIC PNL TOTAL CA: CPT | Performed by: INTERNAL MEDICINE

## 2022-06-17 DEVICE — CLOSURE ANGIOSEAL 6FR 610130: Type: IMPLANTABLE DEVICE | Status: FUNCTIONAL

## 2022-06-17 DEVICE — STENT CORONARY DES SYNERGY XD MR US 2.75X32MM H7493941832270: Type: IMPLANTABLE DEVICE | Status: FUNCTIONAL

## 2022-06-17 RX ORDER — EPTIFIBATIDE 2 MG/ML
180 INJECTION, SOLUTION INTRAVENOUS EVERY 10 MIN PRN
Status: DISCONTINUED | OUTPATIENT
Start: 2022-06-17 | End: 2022-06-17 | Stop reason: HOSPADM

## 2022-06-17 RX ORDER — CLOPIDOGREL BISULFATE 75 MG/1
75 TABLET ORAL DAILY
Status: DISCONTINUED | OUTPATIENT
Start: 2022-06-18 | End: 2022-06-18 | Stop reason: HOSPADM

## 2022-06-17 RX ORDER — HYDRALAZINE HYDROCHLORIDE 20 MG/ML
INJECTION INTRAMUSCULAR; INTRAVENOUS
Status: DISCONTINUED | OUTPATIENT
Start: 2022-06-17 | End: 2022-06-17 | Stop reason: HOSPADM

## 2022-06-17 RX ORDER — ASPIRIN 81 MG/1
81 TABLET, CHEWABLE ORAL ONCE
Status: DISCONTINUED | OUTPATIENT
Start: 2022-06-17 | End: 2022-06-17

## 2022-06-17 RX ORDER — NALOXONE HYDROCHLORIDE 0.4 MG/ML
0.2 INJECTION, SOLUTION INTRAMUSCULAR; INTRAVENOUS; SUBCUTANEOUS
Status: DISCONTINUED | OUTPATIENT
Start: 2022-06-17 | End: 2022-06-18 | Stop reason: HOSPADM

## 2022-06-17 RX ORDER — ARGATROBAN 1 MG/ML
150 INJECTION, SOLUTION INTRAVENOUS
Status: DISCONTINUED | OUTPATIENT
Start: 2022-06-17 | End: 2022-06-17 | Stop reason: HOSPADM

## 2022-06-17 RX ORDER — NITROGLYCERIN 0.4 MG/1
0.4 TABLET SUBLINGUAL EVERY 5 MIN PRN
Status: DISCONTINUED | OUTPATIENT
Start: 2022-06-17 | End: 2022-06-18 | Stop reason: HOSPADM

## 2022-06-17 RX ORDER — EPTIFIBATIDE 2 MG/ML
2 INJECTION, SOLUTION INTRAVENOUS CONTINUOUS PRN
Status: DISCONTINUED | OUTPATIENT
Start: 2022-06-17 | End: 2022-06-17 | Stop reason: HOSPADM

## 2022-06-17 RX ORDER — FLUMAZENIL 0.1 MG/ML
0.2 INJECTION, SOLUTION INTRAVENOUS
Status: DISCONTINUED | OUTPATIENT
Start: 2022-06-17 | End: 2022-06-18 | Stop reason: HOSPADM

## 2022-06-17 RX ORDER — HEPARIN SODIUM 10000 [USP'U]/100ML
100-1000 INJECTION, SOLUTION INTRAVENOUS CONTINUOUS PRN
Status: DISCONTINUED | OUTPATIENT
Start: 2022-06-17 | End: 2022-06-17 | Stop reason: HOSPADM

## 2022-06-17 RX ORDER — LIDOCAINE 40 MG/G
CREAM TOPICAL
Status: DISCONTINUED | OUTPATIENT
Start: 2022-06-17 | End: 2022-06-17 | Stop reason: HOSPADM

## 2022-06-17 RX ORDER — ARGATROBAN 1 MG/ML
350 INJECTION, SOLUTION INTRAVENOUS
Status: DISCONTINUED | OUTPATIENT
Start: 2022-06-17 | End: 2022-06-17 | Stop reason: HOSPADM

## 2022-06-17 RX ORDER — ONDANSETRON 4 MG/1
4 TABLET, ORALLY DISINTEGRATING ORAL EVERY 6 HOURS PRN
Status: DISCONTINUED | OUTPATIENT
Start: 2022-06-17 | End: 2022-06-18 | Stop reason: HOSPADM

## 2022-06-17 RX ORDER — METOPROLOL TARTRATE 1 MG/ML
5 INJECTION, SOLUTION INTRAVENOUS
Status: DISCONTINUED | OUTPATIENT
Start: 2022-06-17 | End: 2022-06-18 | Stop reason: HOSPADM

## 2022-06-17 RX ORDER — FENTANYL CITRATE 50 UG/ML
INJECTION, SOLUTION INTRAMUSCULAR; INTRAVENOUS
Status: DISCONTINUED | OUTPATIENT
Start: 2022-06-17 | End: 2022-06-17 | Stop reason: HOSPADM

## 2022-06-17 RX ORDER — ASPIRIN 325 MG
325 TABLET ORAL ONCE
Status: COMPLETED | OUTPATIENT
Start: 2022-06-17 | End: 2022-06-17

## 2022-06-17 RX ORDER — SODIUM CHLORIDE 9 MG/ML
INJECTION, SOLUTION INTRAVENOUS CONTINUOUS
Status: ACTIVE | OUTPATIENT
Start: 2022-06-17 | End: 2022-06-17

## 2022-06-17 RX ORDER — HEPARIN SODIUM 1000 [USP'U]/ML
INJECTION, SOLUTION INTRAVENOUS; SUBCUTANEOUS
Status: DISCONTINUED | OUTPATIENT
Start: 2022-06-17 | End: 2022-06-17 | Stop reason: HOSPADM

## 2022-06-17 RX ORDER — HYDRALAZINE HYDROCHLORIDE 20 MG/ML
10 INJECTION INTRAMUSCULAR; INTRAVENOUS EVERY 4 HOURS PRN
Status: DISCONTINUED | OUTPATIENT
Start: 2022-06-17 | End: 2022-06-18 | Stop reason: HOSPADM

## 2022-06-17 RX ORDER — NALOXONE HYDROCHLORIDE 0.4 MG/ML
0.4 INJECTION, SOLUTION INTRAMUSCULAR; INTRAVENOUS; SUBCUTANEOUS
Status: DISCONTINUED | OUTPATIENT
Start: 2022-06-17 | End: 2022-06-18 | Stop reason: HOSPADM

## 2022-06-17 RX ORDER — ONDANSETRON 2 MG/ML
4 INJECTION INTRAMUSCULAR; INTRAVENOUS EVERY 6 HOURS PRN
Status: DISCONTINUED | OUTPATIENT
Start: 2022-06-17 | End: 2022-06-18 | Stop reason: HOSPADM

## 2022-06-17 RX ORDER — IOPAMIDOL 755 MG/ML
INJECTION, SOLUTION INTRAVASCULAR
Status: DISCONTINUED | OUTPATIENT
Start: 2022-06-17 | End: 2022-06-17 | Stop reason: HOSPADM

## 2022-06-17 RX ORDER — SODIUM CHLORIDE 9 MG/ML
INJECTION, SOLUTION INTRAVENOUS CONTINUOUS
Status: DISCONTINUED | OUTPATIENT
Start: 2022-06-17 | End: 2022-06-17 | Stop reason: HOSPADM

## 2022-06-17 RX ORDER — ATROPINE SULFATE 0.1 MG/ML
0.5 INJECTION INTRAVENOUS
Status: DISCONTINUED | OUTPATIENT
Start: 2022-06-17 | End: 2022-06-18 | Stop reason: HOSPADM

## 2022-06-17 RX ORDER — ASPIRIN 81 MG/1
81 TABLET ORAL DAILY
Status: DISCONTINUED | OUTPATIENT
Start: 2022-06-18 | End: 2022-06-18 | Stop reason: HOSPADM

## 2022-06-17 RX ORDER — DOPAMINE HYDROCHLORIDE 160 MG/100ML
2-20 INJECTION, SOLUTION INTRAVENOUS CONTINUOUS PRN
Status: DISCONTINUED | OUTPATIENT
Start: 2022-06-17 | End: 2022-06-17 | Stop reason: HOSPADM

## 2022-06-17 RX ORDER — DOBUTAMINE HYDROCHLORIDE 200 MG/100ML
2-20 INJECTION INTRAVENOUS CONTINUOUS PRN
Status: DISCONTINUED | OUTPATIENT
Start: 2022-06-17 | End: 2022-06-17 | Stop reason: HOSPADM

## 2022-06-17 RX ORDER — ASPIRIN 81 MG/1
243 TABLET, CHEWABLE ORAL ONCE
Status: COMPLETED | OUTPATIENT
Start: 2022-06-17 | End: 2022-06-17

## 2022-06-17 RX ORDER — EPTIFIBATIDE 2 MG/ML
1 INJECTION, SOLUTION INTRAVENOUS CONTINUOUS PRN
Status: DISCONTINUED | OUTPATIENT
Start: 2022-06-17 | End: 2022-06-17 | Stop reason: HOSPADM

## 2022-06-17 RX ORDER — NITROGLYCERIN 20 MG/100ML
10-200 INJECTION INTRAVENOUS CONTINUOUS PRN
Status: DISCONTINUED | OUTPATIENT
Start: 2022-06-17 | End: 2022-06-17 | Stop reason: HOSPADM

## 2022-06-17 RX ORDER — FENTANYL CITRATE 50 UG/ML
25 INJECTION, SOLUTION INTRAMUSCULAR; INTRAVENOUS
Status: DISCONTINUED | OUTPATIENT
Start: 2022-06-17 | End: 2022-06-18 | Stop reason: HOSPADM

## 2022-06-17 RX ADMIN — ASPIRIN 325 MG ORAL TABLET 325 MG: 325 PILL ORAL at 11:51

## 2022-06-17 RX ADMIN — LIDOCAINE: 40 CREAM TOPICAL at 11:51

## 2022-06-17 NOTE — PROGRESS NOTES
Cardiology Cath Lab Service      Tessa Mansfield MRN# [id number]   YOB: 1935 Age: 87 year old       Assessment and plan:     Tessa aMnsfield is a 87 year old female with a PMHx including CKD, HLD, and extensive CAD history on indefinite dual antiplatelet therapy and sick sinus syndrome s/p dual-chamber PPM. Also history of Covid in 2021 with chronic cough.  She has been referred for angiogram and right heart catheterization by Dr. Santoyo for recurrent SOB and angina.    Contrast allergy: No  Anticoagulation: None  Aspirin/antiplatelet: Yes; took Plavix this AM, to be given ASA prior to procedure.  Renal concerns: YES---Scr 1.69 this AM  Pt NPO at least 6 hours:Yes  Does patient have : Yes    Pre procedure labs reviewed as below.     CONSENT:    We discussed recommendations for angiogram, and right heart catheterization and reviewed risks and benefits. These include but are not limited to: heart attack, stroke, infection, hematoma/vascular complications, use of stents, and cardiac arrhythmias. We also discussed the risk of potential dye nephropathy and the need for DAPT if stenting is required. The patient voices his/her understanding and wishes to proceed. Verbal and written consent obtained.    No contraindications identified, will move forward with planned procedure.   Patient plans to discharge home with her  after post procedure orders have been met.      Physical Examination:  Vitals: BP (!) 175/95 (BP Location: Left arm)   Pulse 85   Temp 98.2  F (36.8  C) (Oral)   Resp 18   SpO2 98%   BMI= There is no height or weight on file to calculate BMI.    Wt Readings from Last 4 Encounters:   06/15/22 74.8 kg (165 lb)   05/17/22 73.9 kg (163 lb)   05/13/22 77 kg (169 lb 12.8 oz)   04/13/22 77.1 kg (170 lb)       GEN:  In general, this is a well nourished female in no acute distress on RA.  Patient accompanied by her .  C/V:  Regular rate and rhythm, no murmur, rub or  gallop. No S3 or RV heave.   RESP: Respirations are unlabored. No use of accessory muscles. Clear to auscultation bilaterally without wheezing, rales, or rhonchi.  NEURO: Alert and oriented, cooperative. Gait not formally assessed. No obvious focal deficits.     Past Medical History:   Diagnosis Date     CAD (coronary artery disease)      CAD s/p PCI+BMS to MRCA 10/2002, PCI to mLCX 2/2003, PCI+DESx2 to pLAD 11/2007, PCI+DESx1 to dRCA 12/2007, PCI+ALVAREZ to RPDA 7/2013; on indefinite DAPT  10/27/2002    10/27/2002: AMI s/p PCI+BMS (3.5x18mm Bx velocity) to mRCA 2/5/2003: Back pain; PCI+stent to mLCX c/b distal embolization/slow flow 4/11/2003: Unstable angina; PCI+stent (Hepacoat velocity) to mLAD 11/27/2007: PCI+DESx2 to pLAD (IVUS w/ calcification of LMCA and ulcerated plaque pLAD, 80% dRCA; also had 80% dLCX, too small to stent) 12/11/2007: Staged PCI. PCI+DESx1 (3.5x13mm Cypher) to dRCA (indefinite DAPT recommended at this time) 6/27/2008: Angina. mLCX stent 70% ISR. LAD and RCA stents patent. Myocardium at risk from LCX felt to be small, medical management preferred to PCI. 11/10/2009: Angina. Findings unchanged from 6/27/2008, FFR LAD 0.90. Medical management recommended. 7/23/2013: Unstable angina; PCI+ALVAREZ to mPDA. Diagnostic findings: LMCA 40% distal. LAD: pLAD stents patent mLAD 30% ISR dLAD 50% diffuse, D2 diffuse disease. LCX 80% mid ISR. RCA diffuse <30% mid, RPLAs diffuse disease, RPDA 100% occlusion.       Cardiac Pacemaker- [employer], dual chamber- NOT dependant 11/28/2007     CKD (chronic kidney disease), stage IV (H)      Hyperlipidemia LDL goal <70      Hypertension      Stented coronary artery 10-    RCA     Stented coronary artery 2-5-2003    LCx     Stented coronary artery 4-    LAD     Stented coronary artery 11-    LAD     Stented coronary artery 12-    RCA     Transient complete heart block (H) 11/28/2007       Past Surgical History:   Procedure Laterality Date      CHOLECYSTECTOMY       EP PACEMAKER N/A 10/15/2019    Procedure: EP PACEMAKER;  Surgeon: Anthony Zhu MD;  Location:  HEART CARDIAC CATH LAB     HC PPM INSERTION NEW/REPLACEMENT W/ ATRIAL&VENTRICULAR LEAD  11-     HYSTERECTOMY         No current facility-administered medications on file prior to encounter.  allopurinol (ZYLOPRIM) 100 MG tablet, Take 1 tablet (100 mg) by mouth daily  amLODIPine (NORVASC) 2.5 MG tablet, Take 2 tablets (5 mg) by mouth daily  aspirin 81 MG tablet, Take 1 tablet by mouth daily.  clopidogrel (PLAVIX) 75 MG tablet, Take 1 tablet (75 mg) by mouth daily  cyanocolbalamin (VITAMIN B-12) 1000 MCG tablet, Take 500 mcg by mouth daily As directed  fluticasone-salmeterol (ADVAIR) 250-50 MCG/DOSE inhaler, Inhale 1 puff into the lungs 2 times daily  furosemide (LASIX) 40 MG tablet, Take 1 tablet by mouth twice daily  metoprolol tartrate (LOPRESSOR) 100 MG tablet, Take 1 tablet (100 mg) by mouth 2 times daily  Multiple Vitamins-Minerals (PRESERVISION AREDS 2+MULTI VIT PO), Take by mouth 2 times daily  omeprazole (PRILOSEC) 40 MG DR capsule, Take 1 capsule by mouth once daily  potassium chloride ER (K-TAB/KLOR-CON) 10 MEQ CR tablet, TAKE 2  TABLETS BY MOUTH IN THE MORNING AND 1 TABLET IN THE EVENING  pravastatin (PRAVACHOL) 80 MG tablet, Take 1 tablet (80 mg) by mouth daily  timolol maleate (ISTALOL) 0.5 % opthalmic solution, INSTILL 1 INTO EACH EYE ONCE DAILY IN THE MORNING  timolol maleate (TIMOPTIC) 0.5 % ophthalmic solution, INSTILL 1 DROP INTO EACH EYE ONCE DAILY IN THE MORNING        Social History     Tobacco Use     Smoking status: Former Smoker     Packs/day: 0.30     Years: 15.00     Pack years: 4.50     Types: Cigarettes     Smokeless tobacco: Never Used     Tobacco comment: Quit 22+ years ago   Substance Use Topics     Alcohol use: Not on file       Allergies   Allergen Reactions     Bactrim [Sulfamethoxazole W/Trimethoprim] Other (See Comments)     Patient unable to recall  "    Biaxin [Clarithromycin]      Chlorthalidone Nausea and Vomiting     Clonidine      Codeine Sulfate Itching     Darvon [Propoxyphene Hcl]      Dilaudid [Hydromorphone] Visual Disturbance     Hallucinations       Gabapentin Other (See Comments) and Fatigue     \"felt drunk\"     Indocin      Levaquin [Levofloxacin Hemihydrate]      Lyrica Fatigue     Morphine Sulfate      Percocet [Oxycodone-Acetaminophen] Other (See Comments)     hallucinations     Pregabalin Itching     Other reaction(s): Fatigue     Shrimp Swelling     Spironolactone Other (See Comments)     Dehydrated       Terazosin      Ciprofloxacin Rash     Simvastatin Palpitations     Muscle weakness, leg cramping         Recent Lab Results:   Latest Reference Range & Units 06/17/22 10:44   Sodium 133 - 144 mmol/L 144   Potassium 3.4 - 5.3 mmol/L 4.6   Chloride 94 - 109 mmol/L 114 (H)   Carbon Dioxide 20 - 32 mmol/L 26   Urea Nitrogen 7 - 30 mg/dL 32 (H)   Creatinine 0.52 - 1.04 mg/dL 1.69 (H)   GFR Estimate >60 mL/min/1.73m2 29 (L) [1]   Calcium 8.5 - 10.1 mg/dL 8.9   Anion Gap 3 - 14 mmol/L 4   Glucose 70 - 99 mg/dL 87   WBC 4.0 - 11.0 10e3/uL 6.5   Hemoglobin 11.7 - 15.7 g/dL 10.8 (L)   Hematocrit 35.0 - 47.0 % 34.5 (L)   Platelet Count 150 - 450 10e3/uL 85 (L)   RBC Count 3.80 - 5.20 10e6/uL 3.64 (L)   MCV 78 - 100 fL 95   MCH 26.5 - 33.0 pg 29.7   MCHC 31.5 - 36.5 g/dL 31.3 (L)   RDW 10.0 - 15.0 % 15.1 (H)       EKG:        Jenni Merino PA-C  [facility]  Pager [phone]  "

## 2022-06-17 NOTE — PROGRESS NOTES
Arrived from home for a RHC and CORS.  BP elevated, and per patient, this is her norm.  Denies pain.  PIV placed.  H&P current.  Consent obtained.  Ready for procedure.

## 2022-06-17 NOTE — PROGRESS NOTES
Patient arrived to 3C via litter from the cardiac cath lab s/p coronary angiogram and right heart catherization procedures. Right groin has angioseal device in place, site covered with Tegaderm bandage, scant drop of blood at site, area is soft and flat to palpation with no bleeding or hematoma. Right neck has sheath in place, area is soft and flat but bruised. Patient endorses discomfort to right neck. Patient off bedrest for right groin at 1830. Right neck sheath will be pulled per orders. Patient is alert and oriented x 4 and able to make needs known. Patient resting in bed. Will call ride later this evening when she meets discharge criteria. Will continue to monitor.

## 2022-06-17 NOTE — Clinical Note
The first balloon was inserted into the right coronary artery.Max pressure = 12 chandan. Total duration = 10 seconds.

## 2022-06-17 NOTE — Clinical Note
dry, intact, no bleeding and no hematoma. RFA sheath removed and Angioseal device used for hemostasis. Site is soft and non-tender; pulse present.

## 2022-06-17 NOTE — Clinical Note
Stent deployed in the right coronary artery. Max pressure = 12 chandan. Total duration = 10 seconds.

## 2022-06-17 NOTE — PROGRESS NOTES
, will recheck in about 40 minutes. Patient officially off bedrest, sitting upright in bed at about 30 degrees. Right groin site soft and flat to palpation with no bleeding or hematoma. Right internal jugular sheath remains because ACT still too high. Will continue to monitor.

## 2022-06-18 NOTE — PROGRESS NOTES
Patient tolerated right internal jugular sheath pull without complications, no bleeding or hematoma. Right groin site remains soft and flat to palpation with no bleeding or hematoma. Patient ambulated in room and to the restroom with cane and staff at standby. Patient voided spontaneously and without difficulty. Patient sitting on edge of bed eating.  is at bedside. Will continue to monitor.

## 2022-06-18 NOTE — PROGRESS NOTES
Focus:Sheath Pull  D: ACT =  At 186    I: MD order to pull sheath(s) when ACT < 180 seconds.  Sheath(s) removed from    RIJ at 2025 .  Quick clot and manual pressure applied.     A: groin site CDI, soft/flat with no ecchymosis/hematoma/hemorrhage present. (+) pedal pulses bilaterally and CMS intact.    P: Continue to monitor site for complications, education patient on bedrest time, possible complications related to sheaths, when to call the RN, and will notify the MDs as necessary.

## 2022-06-18 NOTE — DISCHARGE INSTRUCTIONS
Going Home after Coronary Angioplasty or Stent Placement  For 24 hours:        Have an adult stay with you for 24 hours.        Relax and take it easy.        Drink plenty of fluids.        You may eat your normal diet, unless your doctor tells you otherwise.        Do NOT make any important or legal decisions.        Do NOT drive or operate machines at home or at work.        Do NOT drink alcohol.      Do NOT smoke.     Medicines:        If you have begun Plavix (clopidogrel), Effient (prasugrel), or Brilinta (ticagrelor), do not stop taking it until you talk to your heart doctor (cardiologist).        If you are on metformin (Glucophage), do not restart it until you have blood tests (within 2 to 3 days after discharge). When your doctor tells you it is safe, you may restart the metformin.        If you have stopped any other medicines, check with your nurse or provider about when to restart them.    Care of groin site:        Remove the Band-Aid after 24 hours. If there is minor oozing, apply another Band-aid and remove it after 12 hours.         Do NOT take a bath, or use a hot tub or pool for at least 3 days. You may shower after 24 hours.        It is normal to have a small bruise or lump at the site.        Do not scrub the site.        Do not use lotion or powder near the puncture site for 3 days.        For the first 2 days: Do not stoop or squat. When you cough, sneeze or move your bowels, hold your hand over the puncture site and press gently.        Do not lift more than 10 pounds for at least 5 days.        For 2 days, do NOT do any exertional exercise or activities.     If you start bleeding from the site in your groin:  Lie down flat and press firmly on the site.  Call your physician immediately, or, come to the emergency room.    Call 911 right away if you have bleeding that is heavy or does not stop.     Call your doctor if:        You have a large or growing hard lump around the site.        The  site is red, swollen, hot or tender.        Blood or fluid is draining from the site.        You have chills or a fever greater than 101 F (38 C).        Your leg or arm turns bluish, feels numb or cool.        You have hives, a rash or unusual itching.   Discharge Instructions Post Right Heart Cath    CALL YOUR PRIMARY PHYSICIAN IF: You may resume all normal activity.  Monitor neck site for bleeding, swelling, or voice changes. If you notice bleeding or swelling immediately apply pressure to the site and call number below to speak with Cardiology Fellow.  If you experience any changes in your breathing you should call your doctor immediately or come to the closest Emergency Department.  Do not drive yourself.    ADDITIONAL INSTRUCTIONS: Medications: You are to resume all home medications including anticoagulation therapy unless otherwise advised by your primary cardiologist or nurse coordinator.    Follow Up: Per your primary cardiology team                                                     Telephone Numbers [phone] Monday-Friday 8:00 am to 4:30 pm    [phone] After 4:30 pm Monday-Friday, Weekends & Holidays  Ask for Interventional Cardiologist on call. Someone is on call 24 hours/day   [facility] toll free number [phone] Monday-Friday 8:00 am to 4:30 pm   [facility] Emergency Dept [phone]

## 2022-06-18 NOTE — PROGRESS NOTES
Patient tolerated a regular diet without feeling nauseated. Right internal jugular site remains soft and flat to palpation with no bleeding or hematoma. Right groin site remains soft and flat to palpation with no bleeding or hematoma. Right wrist site where a radial approach was attempted but aborted has gauze and Tegaderm bandage that is CDI with no bleeding and site is soft and flat to palpation with no signs of hematoma. Patient and  both verbalized understanding of all discharge instructions, all questions answered. PIV removed. Patient assisted to main entrance via wheelchair accompanied by staff while  went to get car for transport home. Patient has all belongings. Patient discharged home with  at 2125.

## 2022-06-20 ENCOUNTER — TELEPHONE (OUTPATIENT)
Dept: PULMONOLOGY | Facility: CLINIC | Age: 87
End: 2022-06-20
Payer: COMMERCIAL

## 2022-06-20 LAB
ACT BLD: 186 SECONDS (ref 74–150)
ACT BLD: 211 SECONDS (ref 74–150)
ACT BLD: 228 SECONDS (ref 74–150)
ATRIAL RATE - MUSE: 64 BPM
ATRIAL RATE - MUSE: 73 BPM
DIASTOLIC BLOOD PRESSURE - MUSE: NORMAL MMHG
DIASTOLIC BLOOD PRESSURE - MUSE: NORMAL MMHG
INTERPRETATION ECG - MUSE: NORMAL
INTERPRETATION ECG - MUSE: NORMAL
P AXIS - MUSE: 66 DEGREES
P AXIS - MUSE: 81 DEGREES
PR INTERVAL - MUSE: 194 MS
PR INTERVAL - MUSE: 252 MS
QRS DURATION - MUSE: 86 MS
QRS DURATION - MUSE: 92 MS
QT - MUSE: 392 MS
QT - MUSE: 422 MS
QTC - MUSE: 435 MS
QTC - MUSE: 464 MS
R AXIS - MUSE: 10 DEGREES
R AXIS - MUSE: 30 DEGREES
SYSTOLIC BLOOD PRESSURE - MUSE: NORMAL MMHG
SYSTOLIC BLOOD PRESSURE - MUSE: NORMAL MMHG
T AXIS - MUSE: 40 DEGREES
T AXIS - MUSE: 40 DEGREES
VENTRICULAR RATE- MUSE: 73 BPM
VENTRICULAR RATE- MUSE: 74 BPM

## 2022-06-20 NOTE — TELEPHONE ENCOUNTER
Called  [facility] and they have attempted to reach out to patient to get nebulizer to her. She has not returned call. Spoke with patient and she will call them to set up delivery. Number given to patient.  Patient spoke with [facility] and they will provide nebulizer for patient. [facility] had sent request for nebulizer order but patient prefers [facility].

## 2022-06-20 NOTE — TELEPHONE ENCOUNTER
[facility] Call Center    Phone Message    May a detailed message be left on voicemail: yes     Reason for Call: Other: Tessa is calling in asking for a call back. She states that she had gone to  her prescription at [facility] in [place], but they have not received an order for the patient's nebulizer. Pt would like to speak with someone about getting this order sent to them once again. Please call back as soon as possible to discuss.     Action Taken: Message routed to:  [facility] Pulm    Travel Screening: Not Applicable

## 2022-06-24 DIAGNOSIS — N18.4 CHRONIC KIDNEY DISEASE, STAGE IV (SEVERE) (H): ICD-10-CM

## 2022-06-24 DIAGNOSIS — I10 ESSENTIAL HYPERTENSION: ICD-10-CM

## 2022-06-24 RX ORDER — FUROSEMIDE 40 MG
TABLET ORAL
Qty: 60 TABLET | Refills: 0 | Status: SHIPPED | OUTPATIENT
Start: 2022-06-24 | End: 2022-08-08

## 2022-06-29 ENCOUNTER — TELEPHONE (OUTPATIENT)
Dept: PULMONOLOGY | Facility: CLINIC | Age: 87
End: 2022-06-29

## 2022-06-29 DIAGNOSIS — N18.4 CKD (CHRONIC KIDNEY DISEASE) STAGE 4, GFR 15-29 ML/MIN (H): ICD-10-CM

## 2022-06-29 DIAGNOSIS — N25.81 SECONDARY RENAL HYPERPARATHYROIDISM (H): ICD-10-CM

## 2022-06-29 RX ORDER — CHOLECALCIFEROL (VITAMIN D3) 25 MCG
TABLET ORAL
Qty: 90 TABLET | Refills: 3 | Status: SHIPPED | OUTPATIENT
Start: 2022-06-29 | End: 2023-04-06

## 2022-06-29 NOTE — TELEPHONE ENCOUNTER
FAX received from [facility] pharmacy in [place] asking for an order for a nebulizer.    Writer confirmed with [facility] that a nebulizer had been sent to patient.    Writer called [facility] pharmacy to let them know patient received her nebulizer from a different company.

## 2022-07-15 ENCOUNTER — ANCILLARY PROCEDURE (OUTPATIENT)
Dept: GENERAL RADIOLOGY | Facility: CLINIC | Age: 87
End: 2022-07-15
Attending: FAMILY MEDICINE
Payer: COMMERCIAL

## 2022-07-15 ENCOUNTER — LAB (OUTPATIENT)
Dept: LAB | Facility: CLINIC | Age: 87
End: 2022-07-15
Payer: COMMERCIAL

## 2022-07-15 ENCOUNTER — OFFICE VISIT (OUTPATIENT)
Dept: FAMILY MEDICINE | Facility: CLINIC | Age: 87
End: 2022-07-15
Payer: COMMERCIAL

## 2022-07-15 VITALS
HEART RATE: 82 BPM | OXYGEN SATURATION: 98 % | DIASTOLIC BLOOD PRESSURE: 84 MMHG | SYSTOLIC BLOOD PRESSURE: 151 MMHG | HEIGHT: 67 IN | WEIGHT: 167.2 LBS | BODY MASS INDEX: 26.24 KG/M2

## 2022-07-15 DIAGNOSIS — M54.9 UPPER BACK PAIN: ICD-10-CM

## 2022-07-15 DIAGNOSIS — Z95.820 S/P ANGIOPLASTY WITH STENT: ICD-10-CM

## 2022-07-15 DIAGNOSIS — M54.9 UPPER BACK PAIN: Primary | ICD-10-CM

## 2022-07-15 DIAGNOSIS — R05.3 CHRONIC COUGH: ICD-10-CM

## 2022-07-15 DIAGNOSIS — I10 ESSENTIAL HYPERTENSION: ICD-10-CM

## 2022-07-15 LAB — TROPONIN I SERPL HS-MCNC: 11 NG/L

## 2022-07-15 PROCEDURE — 93000 ELECTROCARDIOGRAM COMPLETE: CPT | Performed by: FAMILY MEDICINE

## 2022-07-15 PROCEDURE — 99215 OFFICE O/P EST HI 40 MIN: CPT | Mod: 25 | Performed by: FAMILY MEDICINE

## 2022-07-15 PROCEDURE — 84484 ASSAY OF TROPONIN QUANT: CPT | Performed by: PATHOLOGY

## 2022-07-15 PROCEDURE — 36415 COLL VENOUS BLD VENIPUNCTURE: CPT | Performed by: PATHOLOGY

## 2022-07-15 PROCEDURE — 72070 X-RAY EXAM THORAC SPINE 2VWS: CPT | Performed by: STUDENT IN AN ORGANIZED HEALTH CARE EDUCATION/TRAINING PROGRAM

## 2022-07-15 RX ORDER — METHYLPREDNISOLONE 4 MG
TABLET, DOSE PACK ORAL
Qty: 21 TABLET | Refills: 0 | Status: SHIPPED | OUTPATIENT
Start: 2022-07-15 | End: 2022-07-21

## 2022-07-15 NOTE — PROGRESS NOTES
"    Assessment & Plan     Upper back pain  Likely MSK, no fx, trial medrol if she'd like, heat ice suggested. EKG and troponin reassuring as is reproduced w/ palpation.  - EKG Performed in Clinic w/ Provider Reading Fee  - XR Ribs & Chest Rt 3vw; Future  - XR Thoracic Spine 2 Views; Future  - Troponin I; Future    CAD: unlikely cause of today sx, reassured. 48 hours of sx.    Htn: cont as is, monitor and see cardiology    Cough: cont current regimen begun by pulross GARCIA    42 minutes spent on the date of the encounter doing chart review, history and exam, documentation and further activities per the note not including EKG read time, including I called and reviewed labs/imaging w/ her later on    BMI:   Estimated body mass index is 26.58 kg/m  as calculated from the following:    Height as of this encounter: 1.689 m (5' 6.5\").    Weight as of this encounter: 75.8 kg (167 lb 3.2 oz).     Pedro Goemz MD  [facility] [place]    Lashell Zarate is a 87 year old, presenting for the following health issues:  RECHECK (Imaging review, stint related issues)      HPI   1-ankle pain better (saw Sp [facility])  2-cough better w/ meds from pulm  3-SOB: better w/ stent, notes rvwd    She recalls years ago, first CAD sx was back ache. Then had dizzy and nausea too.    Last two days back ache for no reason. No trauma, no rash, no associated sob, dizzy, nausea, sweats. Nothing makes it better or worse except it might be worse if sits a certain way straight up. No pleurisy. Pain is upper R back.   Doing cardiac rehab, sx there during rehab yesterday, session went well    Past Medical History:   Diagnosis Date     CAD (coronary artery disease)      CAD s/p PCI+BMS to MRCA 10/2002, PCI to mLCX 2/2003, PCI+DESx2 to pLAD 11/2007, PCI+DESx1 to dRCA 12/2007, PCI+ALVAREZ to RPDA 7/2013; on indefinite DAPT  10/27/2002    10/27/2002: AMI s/p PCI+BMS (3.5x18mm Bx velocity) to mRCA 2/5/2003: Back pain; PCI+stent to " mLCX c/b distal embolization/slow flow 4/11/2003: Unstable angina; PCI+stent (Hepacoat velocity) to mLAD 11/27/2007: PCI+DESx2 to pLAD (IVUS w/ calcification of LMCA and ulcerated plaque pLAD, 80% dRCA; also had 80% dLCX, too small to stent) 12/11/2007: Staged PCI. PCI+DESx1 (3.5x13mm Cypher) to dRCA (indefinite DAPT recommended at this time) 6/27/2008: Angina. mLCX stent 70% ISR. LAD and RCA stents patent. Myocardium at risk from LCX felt to be small, medical management preferred to PCI. 11/10/2009: Angina. Findings unchanged from 6/27/2008, FFR LAD 0.90. Medical management recommended. 7/23/2013: Unstable angina; PCI+ALVAREZ to mPDA. Diagnostic findings: LMCA 40% distal. LAD: pLAD stents patent mLAD 30% ISR dLAD 50% diffuse, D2 diffuse disease. LCX 80% mid ISR. RCA diffuse <30% mid, RPLAs diffuse disease, RPDA 100% occlusion.       Cardiac Pacemaker- Medtronic, dual chamber- NOT dependant 11/28/2007     CKD (chronic kidney disease), stage IV (H)      Hyperlipidemia LDL goal <70      Hypertension      Stented coronary artery 10-    RCA     Stented coronary artery 2-5-2003    LCx     Stented coronary artery 4-    LAD     Stented coronary artery 11-    LAD     Stented coronary artery 12-    RCA     Transient complete heart block (H) 11/28/2007     Past Surgical History:   Procedure Laterality Date     CHOLECYSTECTOMY       CV CORONARY ANGIOGRAM N/A 6/17/2022    Procedure: Coronary Angiogram;  Surgeon: Constantine Macias MD;  Location:  HEART CARDIAC CATH LAB     CV PCI N/A 6/17/2022    Procedure: Percutaneous Coronary Intervention;  Surgeon: Constantine Macias MD;  Location: [facility] CARDIAC CATH LAB     CV RIGHT HEART CATH MEASUREMENTS RECORDED N/A 6/17/2022    Procedure: Right Heart Catheterization;  Surgeon: Constantine Macias MD;  Location: [facility] CARDIAC CATH LAB     EP PACEMAKER N/A 10/15/2019    Procedure: EP PACEMAKER;  Surgeon: Anthony Zhu MD;  Location:   "HEART CARDIAC CATH LAB     HC PPM INSERTION NEW/REPLACEMENT W/ ATRIAL&VENTRICULAR LEAD  11-     HYSTERECTOMY       Current Outpatient Medications   Medication     allopurinol (ZYLOPRIM) 100 MG tablet     amLODIPine (NORVASC) 10 MG tablet     amLODIPine (NORVASC) 2.5 MG tablet     aspirin 81 MG tablet     budesonide (PULMICORT) 0.5 MG/2ML neb solution     clopidogrel (PLAVIX) 75 MG tablet     cyanocolbalamin (VITAMIN B-12) 1000 MCG tablet     fluticasone-salmeterol (ADVAIR) 250-50 MCG/DOSE inhaler     furosemide (LASIX) 40 MG tablet     isosorbide mononitrate (ISMO/MONOKET) 20 MG tablet     metoprolol tartrate (LOPRESSOR) 100 MG tablet     Multiple Vitamins-Minerals (PRESERVISION AREDS 2+MULTI VIT PO)     omeprazole (PRILOSEC) 40 MG DR capsule     potassium chloride ER (K-TAB/KLOR-CON) 10 MEQ CR tablet     pravastatin (PRAVACHOL) 80 MG tablet     timolol maleate (ISTALOL) 0.5 % opthalmic solution     timolol maleate (TIMOPTIC) 0.5 % ophthalmic solution     VITAMIN D3 25 MCG (1000 UT) tablet     No current facility-administered medications for this visit.     Allergies   Allergen Reactions     Bactrim [Sulfamethoxazole W/Trimethoprim] Other (See Comments)     Patient unable to recall     Biaxin [Clarithromycin]      Chlorthalidone Nausea and Vomiting     Clonidine      Codeine Sulfate Itching     Darvon [Propoxyphene Hcl]      Dilaudid [Hydromorphone] Visual Disturbance     Hallucinations       Gabapentin Other (See Comments) and Fatigue     \"felt drunk\"     Indocin      Levaquin [Levofloxacin Hemihydrate]      Lyrica Fatigue     Morphine Sulfate      Percocet [Oxycodone-Acetaminophen] Other (See Comments)     hallucinations     Pregabalin Itching     Other reaction(s): Fatigue     Shrimp Swelling     Spironolactone Other (See Comments)     Dehydrated       Terazosin      Ciprofloxacin Rash     Simvastatin Palpitations     Muscle weakness, leg cramping         Review of Systems   EKG normal except shows " "pacemaker      Objective    BP (!) 151/84 (BP Location: Right arm, Patient Position: Sitting, Cuff Size: Adult Regular)   Pulse 82   Ht 1.689 m (5' 6.5\")   Wt 75.8 kg (167 lb 3.2 oz)   SpO2 98%   BMI 26.58 kg/m    Body mass index is 26.58 kg/m .  Physical Exam   GENERAL: healthy, alert and no distress  NECK: no adenopathy, no asymmetry, masses, or scars and thyroid normal to palpation  RESP: lungs clear to auscultation - no rales, rhonchi or wheezes  CV: regular rate and rhythm, normal S1 S2, no S3 or S4, no murmur, click or rub, no peripheral edema and peripheral pulses strong  ABDOMEN: soft, nontender, no hepatosplenomegaly, no masses and bowel sounds normal  MS: no gross musculoskeletal defects noted, no edema  Upper back right, between scapula and spine, tender to touch, reproduces pain, no rash, not red warm swollen or bruised              .  ..    "

## 2022-07-15 NOTE — NURSING NOTE
Tessa Mansfield is a 87 year old female patient that presents today in clinic for the following:    Chief Complaint   Patient presents with     RECHECK     Imaging review, stint related issues     The patient's allergies and medications were reviewed as noted. A set of vitals were recorded as noted without incident. The patient does not have any other questions for the provider.    Kody Gates, EMT at 12:54 PM on 7/15/2022

## 2022-07-19 ENCOUNTER — TELEPHONE (OUTPATIENT)
Dept: INTERNAL MEDICINE | Facility: CLINIC | Age: 87
End: 2022-07-19

## 2022-07-19 DIAGNOSIS — E87.6 HYPOKALEMIA: ICD-10-CM

## 2022-07-19 NOTE — TELEPHONE ENCOUNTER
----- Message from Pedro Gomez MD sent at 7/19/2022 12:19 PM CDT -----  I saw her Friday for upper back pain that seemed to be muscular can you call her and see how she is doing?

## 2022-07-20 ENCOUNTER — TELEPHONE (OUTPATIENT)
Dept: INTERNAL MEDICINE | Facility: CLINIC | Age: 87
End: 2022-07-20

## 2022-07-20 NOTE — TELEPHONE ENCOUNTER
Spoke to Tessa via telephone regarding recent office visit. She reports that her back pain continues, that it has not gotten better with any of the recommendations from PCP. She reports that she has been taking prednisone, but this hasn't helped in any way. Today she rates her pain as a 6/10. She goes to cardiac rehab, and has started exercises to stretch out back, but she just started this yesterday so it's too soon to see relief per patient. She denies trying anything thus far to make it feel better. Will forward.  Razia Siddiqui RN

## 2022-07-20 NOTE — TELEPHONE ENCOUNTER
----- Message from Perdo Gomez MD sent at 7/19/2022 12:19 PM CDT -----  I saw her Friday for upper back pain that seemed to be muscular can you call her and see how she is doing?

## 2022-07-21 ENCOUNTER — OFFICE VISIT (OUTPATIENT)
Dept: INTERNAL MEDICINE | Facility: CLINIC | Age: 87
End: 2022-07-21
Payer: COMMERCIAL

## 2022-07-21 ENCOUNTER — TELEPHONE (OUTPATIENT)
Dept: FAMILY MEDICINE | Facility: CLINIC | Age: 87
End: 2022-07-21

## 2022-07-21 VITALS
BODY MASS INDEX: 27.32 KG/M2 | HEART RATE: 80 BPM | HEIGHT: 66 IN | DIASTOLIC BLOOD PRESSURE: 79 MMHG | OXYGEN SATURATION: 97 % | SYSTOLIC BLOOD PRESSURE: 146 MMHG

## 2022-07-21 DIAGNOSIS — B02.9 HERPES ZOSTER WITHOUT COMPLICATION: Primary | ICD-10-CM

## 2022-07-21 PROCEDURE — 99214 OFFICE O/P EST MOD 30 MIN: CPT | Mod: GC

## 2022-07-21 RX ORDER — VALACYCLOVIR HYDROCHLORIDE 1 G/1
1000 TABLET, FILM COATED ORAL EVERY 12 HOURS
Qty: 14 TABLET | Refills: 0 | Status: SHIPPED | OUTPATIENT
Start: 2022-07-21 | End: 2023-05-01

## 2022-07-21 NOTE — TELEPHONE ENCOUNTER
Hello, this is Mario's office from Medicine Specialty on the 3rd floor at [facility] located at [address] [place] [address]. We are reminding you of your upcoming Nephrology appointment on 11/8/2017 at 11:10 am. Please arrive an hour prior to your appointment time for labs. Also, please bring an updated medication list including dose of prescribed and over the counter medications you are currently taking or your labeled medication bottles with you to your appointment. If you have any questions or would like to cancel or reschedule your appointment, please call us at [phone].     If you are having labs draw same day you need a lab appointment scheduled 1 hour prior to your visit.    You are welcome to have your labs done 2-7 days before your appointment at any [place] or [facility] facility. If you have your labs completed before your appointment, please still come 30 minutes early for check-in.     Thank-You for allowing us to be a member of your Health Care Team,     Ruby Jones., CMA       Pt cancelled today's appointment.

## 2022-07-21 NOTE — TELEPHONE ENCOUNTER
[facility] Call Center    Phone Message    May a detailed message be left on voicemail: yes     Reason for Call: Symptoms or Concerns       Current symptom or concern: Patient saw Dr. Gomez on Friday. Discussed back pain and trying to find out the cause. Nurse Razia called patient yesterday and asked further questions. Patient originally thought it was singles and nothing showed up. Today, patient woke up with one big red spot on her back that is getting bigger. Thinks it is shingles.     Symptoms have been present for:  1 day    Has patient previously been seen for this? Yes    By Dr. Gomez    Date: 07/15/2022    Are there any new or worsening symptoms? Yes: Red spot on back. Growing bigger.      Action Taken: Message routed to:  [facility]    Travel Screening: Not Applicable

## 2022-07-21 NOTE — PROGRESS NOTES
[facility]  Internal Medicine    Chief Complaint   Patient presents with     Shingles       Primary Care Provider: Pedro Gomez MD    Subjective:    HPI:  Tessa Mansfield is a/an 87 year old female with a past medical history as noted below, who presents today with the above chief complaint. Patient was seen last Friday after she developed upper back pain. There was nothing associated with the onset of her pain. She states it feels like it's on the skin only and is not internal. EKG and troponin were reassuring at her last visit. Her pain was reproducible to palpation and it was thought to be musculoskeletal in nature. She reports she has been taking prednisone since and finished her last dose yesterday. She notes that she developed a rash yesterday which worsened overnight which is why she came in. She says her symptoms are similar to a previous episode of shingles. She has received the shingles vaccine and states she is due for the next dose. She denies any fevers, weight changes, sore throat, rhinorrhea, weakness, fatigue, chest pain, new dyspnea, nausea, vomiting, diarrhea, constipation, urinary changes, joint pain, vision changes or lymphadenopathy. She has no further concerns or questions at this time.     Review of systems:  See HPI    Patient Active Problem List   Diagnosis     Degeneration of cervical intervertebral disc     Nonallopathic lesion of cervical region     Nonallopathic lesion of thoracic region     CAD s/p PCI+BMS to MRCA 10/2002, PCI to mLCX 2/2003, PCI+DESx2 to pLAD 11/2007, PCI+DESx1 to dRCA 12/2007, PCI+ALVAREZ to RPDA 7/2013; on indefinite DAPT w/o angina     Dizziness and giddiness     Edema     Esophageal reflux     Gout     Essential hypertension     Obstructive sleep apnea     Osteomalacia     Post-menopausal osteoporosis     Cardiac Pacemaker- Medtronic, dual chamber- NOT dependant     Transient complete heart block (H)     Sinus node dysfunction (H)     Disturbance of  skin sensation     NSTEMI (non-ST elevated myocardial infarction) (H)     Arrhythmia     Sick sinus syndrome (H)     Non-rheumatic mitral regurgitation     Non-rheumatic tricuspid valve insufficiency     CKD (chronic kidney disease) stage 4, GFR 15-29 ml/min (H)     Status post coronary angiogram     Past Medical History:   Diagnosis Date     CAD (coronary artery disease)      CAD s/p PCI+BMS to MRCA 10/2002, PCI to mLCX 2/2003, PCI+DESx2 to pLAD 11/2007, PCI+DESx1 to dRCA 12/2007, PCI+ALVAREZ to RPDA 7/2013; on indefinite DAPT  10/27/2002    10/27/2002: AMI s/p PCI+BMS (3.5x18mm Bx velocity) to mRCA 2/5/2003: Back pain; PCI+stent to mLCX c/b distal embolization/slow flow 4/11/2003: Unstable angina; PCI+stent (Hepacoat velocity) to mLAD 11/27/2007: PCI+DESx2 to pLAD (IVUS w/ calcification of LMCA and ulcerated plaque pLAD, 80% dRCA; also had 80% dLCX, too small to stent) 12/11/2007: Staged PCI. PCI+DESx1 (3.5x13mm Cypher) to dRCA (indefinite DAPT recommended at this time) 6/27/2008: Angina. mLCX stent 70% ISR. LAD and RCA stents patent. Myocardium at risk from LCX felt to be small, medical management preferred to PCI. 11/10/2009: Angina. Findings unchanged from 6/27/2008, FFR LAD 0.90. Medical management recommended. 7/23/2013: Unstable angina; PCI+ALVAREZ to mPDA. Diagnostic findings: LMCA 40% distal. LAD: pLAD stents patent mLAD 30% ISR dLAD 50% diffuse, D2 diffuse disease. LCX 80% mid ISR. RCA diffuse <30% mid, RPLAs diffuse disease, RPDA 100% occlusion.       Cardiac Pacemaker- Medtronic, dual chamber- NOT dependant 11/28/2007     CKD (chronic kidney disease), stage IV (H)      Hyperlipidemia LDL goal <70      Hypertension      Stented coronary artery 10-    RCA     Stented coronary artery 2-5-2003    LCx     Stented coronary artery 4-    LAD     Stented coronary artery 11-    LAD     Stented coronary artery 12-    RCA     Transient complete heart block (H) 11/28/2007     Past Surgical  History:   Procedure Laterality Date     CHOLECYSTECTOMY       CV CORONARY ANGIOGRAM N/A 6/17/2022    Procedure: Coronary Angiogram;  Surgeon: Constantine Macias MD;  Location:  [facility]     CV PCI N/A 6/17/2022    Procedure: Percutaneous Coronary Intervention;  Surgeon: Constantine Macias MD;  Location:  [facility]     CV RIGHT HEART CATH MEASUREMENTS RECORDED N/A 6/17/2022    Procedure: Right Heart Catheterization;  Surgeon: Constantine Macias MD;  Location:  [facility]     EP PACEMAKER N/A 10/15/2019    Procedure: EP PACEMAKER;  Surgeon: Anthony Zhu MD;  Location:  [facility]     HC PPM INSERTION NEW/REPLACEMENT W/ ATRIAL&VENTRICULAR LEAD  11-     HYSTERECTOMY       Current Outpatient Medications   Medication Sig Dispense Refill     allopurinol (ZYLOPRIM) 100 MG tablet Take 1 tablet (100 mg) by mouth daily 90 tablet 3     amLODIPine (NORVASC) 10 MG tablet Take 1 tablet (10 mg) by mouth daily 90 tablet 3     amLODIPine (NORVASC) 2.5 MG tablet Take 2 tablets (5 mg) by mouth daily 90 tablet 3     aspirin 81 MG tablet Take 1 tablet by mouth daily.       budesonide (PULMICORT) 0.5 MG/2ML neb solution Take 2 mLs (0.5 mg) by nebulization 2 times daily 2 mL 3     clopidogrel (PLAVIX) 75 MG tablet Take 1 tablet (75 mg) by mouth daily 90 tablet 3     cyanocolbalamin (VITAMIN B-12) 1000 MCG tablet Take 500 mcg by mouth daily As directed       fluticasone-salmeterol (ADVAIR) 250-50 MCG/DOSE inhaler Inhale 1 puff into the lungs 2 times daily 60 each 4     furosemide (LASIX) 40 MG tablet Take 1 tablet by mouth twice daily 60 tablet 0     isosorbide mononitrate (ISMO/MONOKET) 20 MG tablet Take 2 tablets (40 mg) by mouth 3 times daily 540 tablet 3     metoprolol tartrate (LOPRESSOR) 100 MG tablet Take 1 tablet (100 mg) by mouth 2 times daily 180 tablet 3     Multiple Vitamins-Minerals (PRESERVISION AREDS 2+MULTI VIT PO) Take by mouth 2 times  "daily       omeprazole (PRILOSEC) 40 MG DR capsule Take 1 capsule by mouth once daily 90 capsule 1     potassium chloride ER (K-TAB/KLOR-CON) 10 MEQ CR tablet TAKE 2  TABLETS BY MOUTH IN THE MORNING AND 1 TABLET IN THE EVENING 270 tablet 0     pravastatin (PRAVACHOL) 80 MG tablet Take 1 tablet (80 mg) by mouth daily 90 tablet 3     timolol maleate (ISTALOL) 0.5 % opthalmic solution INSTILL 1 INTO EACH EYE ONCE DAILY IN THE MORNING       timolol maleate (TIMOPTIC) 0.5 % ophthalmic solution INSTILL 1 DROP INTO EACH EYE ONCE DAILY IN THE MORNING       valACYclovir (VALTREX) 1000 mg tablet Take 1 tablet (1,000 mg) by mouth every 12 hours for 7 days 14 tablet 0     VITAMIN D3 25 MCG (1000 UT) tablet TAKE 1 TABLET BY MOUTH ONCE DAILY **DUE TO BE SEEN FOR FURTHER REFILLS** 90 tablet 3     Allergies   Allergen Reactions     Bactrim [Sulfamethoxazole W/Trimethoprim] Other (See Comments)     Patient unable to recall     Biaxin [Clarithromycin]      Chlorthalidone Nausea and Vomiting     Clonidine      Codeine Sulfate Itching     Darvon [Propoxyphene Hcl]      Dilaudid [Hydromorphone] Visual Disturbance     Hallucinations       Gabapentin Other (See Comments) and Fatigue     \"felt drunk\"     Indocin      Levaquin [Levofloxacin Hemihydrate]      Lyrica Fatigue     Morphine Sulfate      Percocet [Oxycodone-Acetaminophen] Other (See Comments)     hallucinations     Pregabalin Itching     Other reaction(s): Fatigue     Shrimp Swelling     Spironolactone Other (See Comments)     Dehydrated       Terazosin      Ciprofloxacin Rash     Simvastatin Palpitations     Muscle weakness, leg cramping       Social History     Tobacco Use     Smoking status: Former Smoker     Packs/day: 0.30     Years: 15.00     Pack years: 4.50     Types: Cigarettes     Smokeless tobacco: Never Used     Tobacco comment: Quit 22+ years ago   Substance Use Topics     Drug use: No     Family History   Problem Relation Age of Onset     Cancer Sister 82        " "bladder cancer     Objective:  BP Readings from Last 6 Encounters:   07/21/22 (!) 146/79   07/15/22 (!) 151/84   06/17/22 (!) 148/68   06/15/22 (!) 172/77   05/17/22 (!) 155/75   05/13/22 (!) 174/97     Wt Readings from Last 5 Encounters:   07/15/22 75.8 kg (167 lb 3.2 oz)   06/17/22 74.8 kg (164 lb 14.5 oz)   06/15/22 74.8 kg (165 lb)   05/17/22 73.9 kg (163 lb)   05/13/22 77 kg (169 lb 12.8 oz)     Estimated body mass index is 27.32 kg/m  as calculated from the following:    Height as of this encounter: 1.666 m (5' 5.6\").    Weight as of 7/15/22: 75.8 kg (167 lb 3.2 oz).  BP (!) 146/79 (BP Location: Right arm, Patient Position: Sitting, Cuff Size: Adult Regular)   Pulse 80   Ht 1.666 m (5' 5.6\")   SpO2 97%   BMI 27.32 kg/m      Physical Exam:    General: Alert and oriented without distress  HEENT: Normocephalic/atraumatic  Respiratory: CTAB without increased respiratory effort or accessory muscle use  Cardiovascular: RRR without murmurs, rubs or gallop. S1, S2 intact.  Skin: Numerous seborrheic keratosis overlying back. Vesicular erythematous rash on the right side of her upper back which is tender to palpation. Small areas of bruising overlying right lower back. (she states this is common for her)    THE FOLLOWING PERTINENT TEST RESULTS WERE REVIEWED TODAY:  Troponin, thoracic, previous BMPs and chest x-ray    Assessment and Plan:    Tessa was seen today for shingles.    Diagnoses and all orders for this visit:    Herpes zoster without complication  Patient presented last week after developing upper back pain. It was thought to be musculoskeletal in nature and she states she had been taking prednisone. She developed a rash in the same location yesterday which has worsened and is consistent with shingles. We will prescribe valacyclovir and we advised the patient that she may be contagious and should be careful to avoid pregnant people, immunosuppressed and young children. The patient was additionally " instructed to wait six months after resolution of symptoms before she gets the next dose of the shingles vaccine.  -     valACYclovir (VALTREX) 1000 mg tablet; Take 1 tablet (1,000 mg) by mouth every 12 hours for 7 days    Patient seen and case dicussed with Dr. Luciano who agrees with the above assessment and plan    Sarkis Preciado, DO  PGY-2, Internal Medicine  [facility]    Patients: if you have questions or concerns about this progress note, please discuss them with the provider at a future office visit.    Pt was seen and examined with Dr. Preciado.  I agree with his documentation as noted above.    My additional comments: None    Ludwin Luciano MD

## 2022-07-21 NOTE — TELEPHONE ENCOUNTER
Spoke to Tessa via telephone regarding recent call to clinic, I recommended evaluation due to potential shingles and continued pain, she agreed to come into clinic for an appt this afternoon.  Razia Siddiqui RN

## 2022-07-26 DIAGNOSIS — E87.6 HYPOKALEMIA: ICD-10-CM

## 2022-07-26 RX ORDER — POTASSIUM CHLORIDE 750 MG/1
TABLET, EXTENDED RELEASE ORAL
Qty: 270 TABLET | Refills: 0 | Status: SHIPPED | OUTPATIENT
Start: 2022-07-26 | End: 2022-08-08

## 2022-07-26 RX ORDER — POTASSIUM CHLORIDE 750 MG/1
TABLET, EXTENDED RELEASE ORAL
Qty: 270 TABLET | Refills: 0 | Status: SHIPPED | OUTPATIENT
Start: 2022-07-26 | End: 2022-10-19

## 2022-08-08 ENCOUNTER — OFFICE VISIT (OUTPATIENT)
Dept: NEPHROLOGY | Facility: CLINIC | Age: 87
End: 2022-08-08
Payer: COMMERCIAL

## 2022-08-08 VITALS
BODY MASS INDEX: 27.28 KG/M2 | WEIGHT: 167 LBS | SYSTOLIC BLOOD PRESSURE: 128 MMHG | HEART RATE: 86 BPM | DIASTOLIC BLOOD PRESSURE: 70 MMHG

## 2022-08-08 DIAGNOSIS — I25.118 CORONARY ARTERY DISEASE OF NATIVE ARTERY OF NATIVE HEART WITH STABLE ANGINA PECTORIS (H): ICD-10-CM

## 2022-08-08 DIAGNOSIS — N18.4 CHRONIC KIDNEY DISEASE, STAGE IV (SEVERE) (H): ICD-10-CM

## 2022-08-08 DIAGNOSIS — E87.6 HYPOKALEMIA: ICD-10-CM

## 2022-08-08 DIAGNOSIS — N25.81 SECONDARY RENAL HYPERPARATHYROIDISM (H): Primary | ICD-10-CM

## 2022-08-08 DIAGNOSIS — D69.6 THROMBOCYTOPENIA (H): ICD-10-CM

## 2022-08-08 DIAGNOSIS — I10 ESSENTIAL HYPERTENSION: ICD-10-CM

## 2022-08-08 LAB
ALBUMIN UR-MCNC: NEGATIVE MG/DL
APPEARANCE UR: CLEAR
BILIRUB UR QL STRIP: NEGATIVE
COLOR UR AUTO: YELLOW
GLUCOSE UR STRIP-MCNC: NEGATIVE MG/DL
HGB UR QL STRIP: NEGATIVE
KETONES UR STRIP-MCNC: NEGATIVE MG/DL
LEUKOCYTE ESTERASE UR QL STRIP: NEGATIVE
NITRATE UR QL: NEGATIVE
PH UR STRIP: 5.5 [PH] (ref 5–7)
RBC #/AREA URNS AUTO: ABNORMAL /HPF
SP GR UR STRIP: 1.01 (ref 1–1.03)
SQUAMOUS #/AREA URNS AUTO: ABNORMAL /LPF
UROBILINOGEN UR STRIP-ACNC: 0.2 E.U./DL
WBC #/AREA URNS AUTO: ABNORMAL /HPF

## 2022-08-08 PROCEDURE — 85027 COMPLETE CBC AUTOMATED: CPT | Performed by: INTERNAL MEDICINE

## 2022-08-08 PROCEDURE — 36415 COLL VENOUS BLD VENIPUNCTURE: CPT | Performed by: INTERNAL MEDICINE

## 2022-08-08 PROCEDURE — 83735 ASSAY OF MAGNESIUM: CPT | Performed by: INTERNAL MEDICINE

## 2022-08-08 PROCEDURE — 80069 RENAL FUNCTION PANEL: CPT | Performed by: INTERNAL MEDICINE

## 2022-08-08 PROCEDURE — 81001 URINALYSIS AUTO W/SCOPE: CPT | Performed by: INTERNAL MEDICINE

## 2022-08-08 PROCEDURE — 84156 ASSAY OF PROTEIN URINE: CPT | Performed by: INTERNAL MEDICINE

## 2022-08-08 PROCEDURE — 99214 OFFICE O/P EST MOD 30 MIN: CPT | Performed by: INTERNAL MEDICINE

## 2022-08-08 RX ORDER — FUROSEMIDE 40 MG
40 TABLET ORAL 2 TIMES DAILY
Qty: 180 TABLET | Refills: 3 | Status: SHIPPED | OUTPATIENT
Start: 2022-08-08 | End: 2023-05-01

## 2022-08-08 NOTE — PROGRESS NOTES
Assessment and Plan:   87 year old female with history of long standing HTN, CAD s/p multiple stents, who presents for followup of CKD stage 3, baseline SCr 1.8-2.2 mg/dL without proteinuria.    She has not been seen in nephrology for a couple years, Another angiogram with stent done June 2022.    1. CKD stage 3- baseline SCr 1.8-2.2mg/ dL. Mild/ minimal proteinuria    - had angiogram in June 2022, no labs since, will do renal panel today  Her swelling previously occasional and she would ttake an extra furosemide but typically only takes furosemide 40mg once a day however now has more swelling since being on amlodipine 10mg for better BP control   - will have her incrase furosemide to BID for 5-7 days and reassess, may benefit from lowering amlodipine to 5mg  - monitor labs     2. Electrolytes/Acid Base status- normal.    Hypokalemia- takes 3 potassium pills daily   - if she has more cramps, should let us know and eat high K diet.  - check Magnesium    3. Hypertension/Volume status-  She is up a little and swelling is also worse. She is on metoprolol 100 mg BID, furosemide 40 mg once a day with an extra pill prn,.   - on amlodipine 10mg she has more swelling  -patient reports history of reaction/iontolerance to Spironolactone, Chlorthalidone, Clonidine and Terazosin in the past    will try furosemide bid for a few days and touch base thereafter, consider lowering amlodipine to 5mg    4. Anemia- previously improved. Last Hgb was in normal range. Iron studies were not recently checked    5. BMD  - last serum calcium and phosphorus were normal  -secondary hyperparathyroidism-PTH was 184. Patient had PTH of 144 in 11/17. Scott pinon    Assessment and plan was discussed with patient and she voiced her understanding and agreement.        Reason for Visit:  Tessa Mansfield is an 87 year old female with HTN, who presents for CKD management.     HPI:  She is a pleasant female with PMMHx significant for stage III CKD  presumed secondary to hypertensive nephrosclerosis.Her renal function has been  relatively stable, ranging from 1.8-2.2 mg/dL over the years. She has history of CAD s/p multiple stents  (8 stents in all) since 2004 and a pacemaker in 2007. She follows with cardiologist Dr Santoyo and Dr Zhu.  Since her last visit she has been ok. She was having more dyspnea on exertion and had an angiogram and stent placed in June 2022. Her creatinine was fairly stable at thie time.    Her blood pressure had been running higher and amlodipine was started and increased to 10mg.  She has had more swelling in lower extremities with this. She is taking furosemide 40mg in AM, rarely in PM   She is moderating salt intake. Her blood pressure has improved with amlodipine but swelling is bothersome      Home BP: 130 range    Baseline Cr: 1.8-2.2    ROS:  A comprehensive review of systems was obtained and negative, except as noted in the HPI or PMH.    Active Medical Problems:  Patient Active Problem List   Diagnosis     Degeneration of cervical intervertebral disc     Nonallopathic lesion of cervical region     Nonallopathic lesion of thoracic region     Coronary artery disease of native artery of native heart with stable angina pectoris (H)     Dizziness and giddiness     Edema     Esophageal reflux     Gout     Essential hypertension     Obstructive sleep apnea     Osteomalacia     Post-menopausal osteoporosis     Cardiac Pacemaker- Medtronic, dual chamber- NOT dependant     Transient complete heart block (H)     Sinus node dysfunction (H)     Disturbance of skin sensation     NSTEMI (non-ST elevated myocardial infarction) (H)     Arrhythmia     Sick sinus syndrome (H)     Non-rheumatic mitral regurgitation     Non-rheumatic tricuspid valve insufficiency     CKD (chronic kidney disease) stage 4, GFR 15-29 ml/min (H)     Status post coronary angiogram     Secondary renal hyperparathyroidism (H)     Thrombocytopenia (H)       Personal  "Hx:   Social History     Socioeconomic History     Marital status:      Spouse name: Not on file     Number of children: 4     Years of education: Not on file     Highest education level: Not on file   Occupational History     Employer: [employer]   Tobacco Use     Smoking status: Former Smoker     Packs/day: 0.30     Years: 15.00     Pack years: 4.50     Types: Cigarettes     Smokeless tobacco: Never Used     Tobacco comment: Quit 22+ years ago   Substance and Sexual Activity     Alcohol use: Not on file     Drug use: No     Sexual activity: Not Currently     Partners: Male     Birth control/protection: Post-menopausal   Other Topics Concern      Service Not Asked     Blood Transfusions Yes     Caffeine Concern Not Asked     Occupational Exposure Not Asked     Hobby Hazards Not Asked     Sleep Concern Not Asked     Stress Concern Not Asked     Weight Concern Not Asked     Special Diet Not Asked     Back Care Not Asked     Exercise No     Bike Helmet Not Asked     Seat Belt Not Asked     Self-Exams Not Asked     Parent/sibling w/ CABG, MI or angioplasty before 65F 55M? Not Asked   Social History Narrative     Not on file     Social Determinants of Health     Financial Resource Strain: Not on file   Food Insecurity: Not on file   Transportation Needs: Not on file   Physical Activity: Not on file   Stress: Not on file   Social Connections: Not on file   Intimate Partner Violence: Not on file   Housing Stability: Not on file       Allergies:  Allergies   Allergen Reactions     Bactrim [Sulfamethoxazole W/Trimethoprim] Other (See Comments)     Patient unable to recall     Biaxin [Clarithromycin]      Chlorthalidone Nausea and Vomiting     Clonidine      Codeine Sulfate Itching     Darvon [Propoxyphene Hcl]      Dilaudid [Hydromorphone] Visual Disturbance     Hallucinations       Gabapentin Other (See Comments) and Fatigue     \"felt drunk\"     Indocin      Levaquin [Levofloxacin Hemihydrate]  "     Lyrica Fatigue     Morphine Sulfate      Percocet [Oxycodone-Acetaminophen] Other (See Comments)     hallucinations     Pregabalin Itching     Other reaction(s): Fatigue     Shrimp Swelling     Spironolactone Other (See Comments)     Dehydrated       Terazosin      Ciprofloxacin Rash     Simvastatin Palpitations     Muscle weakness, leg cramping         Current Outpatient Medications   Medication     allopurinol (ZYLOPRIM) 100 MG tablet     amitriptyline (ELAVIL) 25 MG tablet     amLODIPine (NORVASC) 10 MG tablet     aspirin 81 MG tablet     budesonide (PULMICORT) 0.5 MG/2ML neb solution     clopidogrel (PLAVIX) 75 MG tablet     cyanocolbalamin (VITAMIN B-12) 1000 MCG tablet     fluticasone-salmeterol (ADVAIR) 250-50 MCG/DOSE inhaler     furosemide (LASIX) 40 MG tablet     isosorbide mononitrate (ISMO/MONOKET) 20 MG tablet     metoprolol tartrate (LOPRESSOR) 100 MG tablet     Multiple Vitamins-Minerals (PRESERVISION AREDS 2+MULTI VIT PO)     omeprazole (PRILOSEC) 40 MG DR capsule     potassium chloride ER (K-TAB/KLOR-CON) 10 MEQ CR tablet     pravastatin (PRAVACHOL) 80 MG tablet     timolol maleate (ISTALOL) 0.5 % opthalmic solution     timolol maleate (TIMOPTIC) 0.5 % ophthalmic solution     VITAMIN D3 25 MCG (1000 UT) tablet     potassium chloride ER (K-TAB/KLOR-CON) 10 MEQ CR tablet     valACYclovir (VALTREX) 1000 mg tablet     No current facility-administered medications for this visit.       Vitals:  /70   Pulse 86   Wt 75.8 kg (167 lb)   BMI 27.28 kg/m      Exam:  General: awake and alert, appears to be in no acute distress  Neuro: normal speech  Skin:nromal, some bruising  Lungs: clear bilaterally  Extremities:++ edema      Results:  Last Comprehensive Metabolic Panel:  Sodium   Date Value Ref Range Status   06/17/2022 144 133 - 144 mmol/L Final   08/26/2020 141 133 - 144 mmol/L Final     Potassium   Date Value Ref Range Status   06/17/2022 4.6 3.4 - 5.3 mmol/L Final   08/26/2020 4.4 3.4 - 5.3  mmol/L Final     Chloride   Date Value Ref Range Status   06/17/2022 114 (H) 94 - 109 mmol/L Final   08/26/2020 111 (H) 94 - 109 mmol/L Final     Carbon Dioxide   Date Value Ref Range Status   08/26/2020 24 20 - 32 mmol/L Final     Carbon Dioxide (CO2)   Date Value Ref Range Status   06/17/2022 26 20 - 32 mmol/L Final     Anion Gap   Date Value Ref Range Status   06/17/2022 4 3 - 14 mmol/L Final   08/26/2020 8 3 - 14 mmol/L Final     Glucose   Date Value Ref Range Status   06/17/2022 87 70 - 99 mg/dL Final   08/26/2020 84 70 - 99 mg/dL Final     Urea Nitrogen   Date Value Ref Range Status   06/17/2022 32 (H) 7 - 30 mg/dL Final   08/26/2020 41 (H) 7 - 30 mg/dL Final     Creatinine   Date Value Ref Range Status   06/17/2022 1.69 (H) 0.52 - 1.04 mg/dL Final   08/26/2020 1.70 (H) 0.52 - 1.04 mg/dL Final     GFR Estimate   Date Value Ref Range Status   06/17/2022 29 (L) >60 mL/min/1.73m2 Final     Comment:     Effective December 21, 2021 eGFRcr in adults is calculated using the 2021 CKD-EPI creatinine equation which includes age and gender (Madhavi et al., NEJ, DOI: 10.1056/AJTKuy9278885)   08/26/2020 27 (L) >60 mL/min/[1.73_m2] Final     Comment:     Non  GFR Calc  Starting 12/18/2018, serum creatinine based estimated GFR (eGFR) will be   calculated using the Chronic Kidney Disease Epidemiology Collaboration   (CKD-EPI) equation.       Calcium   Date Value Ref Range Status   06/17/2022 8.9 8.5 - 10.1 mg/dL Final   08/26/2020 9.0 8.5 - 10.1 mg/dL Final       Last Basic Metabolic Panel:  Lab Results   Component Value Date     11/08/2017      Lab Results   Component Value Date    POTASSIUM 3.5 11/08/2017     Lab Results   Component Value Date    CHLORIDE 104 11/08/2017     Lab Results   Component Value Date    ALEXANDRO 8.8 11/08/2017     Lab Results   Component Value Date    CO2 26 11/08/2017     Lab Results   Component Value Date    BUN 54 11/08/2017     Lab Results   Component Value Date    CR 2.36  11/08/2017     Lab Results   Component Value Date    GLC 88 11/08/2017       Marzena Martin MD   of Medicine  Department of Nephrology  [facility]

## 2022-08-08 NOTE — LETTER
August 10, 2022      Tessa RAHEEM Mansfield  [address]  [facility] [address]        Dear ,    We are writing to inform you of your test results. Creatinine 2.2, a little higher, let us know how swelling and blood pressure is doing and we can cut down amlodipine to 5mg. We will see you in November.     Resulted Orders   Renal panel   Result Value Ref Range    Sodium 140 133 - 144 mmol/L    Potassium 4.5 3.4 - 5.3 mmol/L    Chloride 110 (H) 94 - 109 mmol/L    Carbon Dioxide (CO2) 23 20 - 32 mmol/L    Anion Gap 7 3 - 14 mmol/L    Urea Nitrogen 46 (H) 7 - 30 mg/dL    Creatinine 2.21 (H) 0.52 - 1.04 mg/dL    Calcium 9.2 8.5 - 10.1 mg/dL    Glucose 99 70 - 99 mg/dL    Albumin 3.6 3.4 - 5.0 g/dL    Phosphorus 3.7 2.5 - 4.5 mg/dL    GFR Estimate 21 (L) >60 mL/min/1.73m2      Comment:      Effective December 21, 2021 eGFRcr in adults is calculated using the 2021 CKD-EPI creatinine equation which includes age and gender (Madhavi et al., NE, DOI: 10.1056/ROZKtz9990321)   CBC with platelets   Result Value Ref Range    WBC Count 6.6 4.0 - 11.0 10e3/uL    RBC Count 3.77 (L) 3.80 - 5.20 10e6/uL    Hemoglobin 11.5 (L) 11.7 - 15.7 g/dL    Hematocrit 35.8 35.0 - 47.0 %    MCV 95 78 - 100 fL    MCH 30.5 26.5 - 33.0 pg    MCHC 32.1 31.5 - 36.5 g/dL    RDW 15.9 (H) 10.0 - 15.0 %    Platelet Count 66 (L) 150 - 450 10e3/uL   Protein  random urine   Result Value Ref Range    Total Protein Urine mg/dL 7.0 mg/dL      Comment:      The reference ranges have not been established in urine protein. The results should be integrated into the clinical context for interpretation.    Total Protein UR MG/MG CR 0.40 (H) 0.00 - 0.20 mg/mg Cr    Creatinine Urine mg/dL 17.5 mg/dL      Comment:      The reference ranges have not been established in urine creatinine. The results should be integrated into the clinical context for interpretation.   UA with Microscopic   Result Value Ref Range    Color Urine Yellow Colorless, Straw, Light Yellow,  Yellow    Appearance Urine Clear Clear    Glucose Urine Negative Negative mg/dL    Bilirubin Urine Negative Negative    Ketones Urine Negative Negative mg/dL    Specific Gravity Urine 1.010 1.003 - 1.035    Blood Urine Negative Negative    pH Urine 5.5 5.0 - 7.0    Protein Albumin Urine Negative Negative mg/dL    Urobilinogen Urine 0.2 0.2, 1.0 E.U./dL    Nitrite Urine Negative Negative    Leukocyte Esterase Urine Negative Negative   Urine Microscopic Exam   Result Value Ref Range    RBC Urine 0-2 0-2 /HPF /HPF    WBC Urine 0-5 0-5 /HPF /HPF    Squamous Epithelials Urine Few (A) None Seen /LPF   Magnesium   Result Value Ref Range    Magnesium 2.1 1.6 - 2.3 mg/dL       If you have any questions or concerns, please call the clinic at the number listed above.       Sincerely,      Marzena Martin MD

## 2022-08-08 NOTE — PATIENT INSTRUCTIONS
Blood pressure goal is 120-130/ 80 or so  Call if higher or lower consistently  Call with update in a couple of weeks about BP and swelling    Take furosemide twice a day for 5-7 days and can continue twice a day if needed, or go back to once a day.

## 2022-08-08 NOTE — LETTER
8/8/2022       RE: Tessa Mansfield  [address]  [facility] [address]     Dear Colleague,    Thank you for referring your patient, Tessa Mansfield, to the [facility] at [facility]. Please see a copy of my visit note below.    Assessment and Plan:   87 year old female with history of long standing HTN, CAD s/p multiple stents, who presents for followup of CKD stage 3, baseline SCr 1.8-2.2 mg/dL without proteinuria.    She has not been seen in nephrology for a couple years, Another angiogram with stent done June 2022.    1. CKD stage 3- baseline SCr 1.8-2.2mg/ dL. Mild/ minimal proteinuria    - had angiogram in June 2022, no labs since, will do renal panel today  Her swelling previously occasional and she would ttake an extra furosemide but typically only takes furosemide 40mg once a day however now has more swelling since being on amlodipine 10mg for better BP control   - will have her incrase furosemide to BID for 5-7 days and reassess, may benefit from lowering amlodipine to 5mg  - monitor labs     2. Electrolytes/Acid Base status- normal.    Hypokalemia- takes 3 potassium pills daily   - if she has more cramps, should let us know and eat high K diet.  - check Magnesium    3. Hypertension/Volume status-  She is up a little and swelling is also worse. She is on metoprolol 100 mg BID, furosemide 40 mg once a day with an extra pill prn,.   - on amlodipine 10mg she has more swelling  -patient reports history of reaction/iontolerance to Spironolactone, Chlorthalidone, Clonidine and Terazosin in the past    will try furosemide bid for a few days and touch base thereafter, consider lowering amlodipine to 5mg    4. Anemia- previously improved. Last Hgb was in normal range. Iron studies were not recently checked    5. BMD  - last serum calcium and phosphorus were normal  -secondary hyperparathyroidism-PTH was 184. Patient had PTH of 144 in 11/17.  We will montor    Assessment and plan was discussed with patient and she voiced her understanding and agreement.        Reason for Visit:  Tessa Mansfield is an 87 year old female with HTN, who presents for CKD management.     HPI:  She is a pleasant female with PMMHx significant for stage III CKD presumed secondary to hypertensive nephrosclerosis.Her renal function has been  relatively stable, ranging from 1.8-2.2 mg/dL over the years. She has history of CAD s/p multiple stents  (8 stents in all) since 2004 and a pacemaker in 2007. She follows with cardiologist Dr Santoyo and Dr Zhu.  Since her last visit she has been ok. She was having more dyspnea on exertion and had an angiogram and stent placed in June 2022. Her creatinine was fairly stable at thie time.    Her blood pressure had been running higher and amlodipine was started and increased to 10mg.  She has had more swelling in lower extremities with this. She is taking furosemide 40mg in AM, rarely in PM   She is moderating salt intake. Her blood pressure has improved with amlodipine but swelling is bothersome      Home BP: 130 range    Baseline Cr: 1.8-2.2    ROS:  A comprehensive review of systems was obtained and negative, except as noted in the HPI or PMH.    Active Medical Problems:  Patient Active Problem List   Diagnosis     Degeneration of cervical intervertebral disc     Nonallopathic lesion of cervical region     Nonallopathic lesion of thoracic region     Coronary artery disease of native artery of native heart with stable angina pectoris (H)     Dizziness and giddiness     Edema     Esophageal reflux     Gout     Essential hypertension     Obstructive sleep apnea     Osteomalacia     Post-menopausal osteoporosis     Cardiac Pacemaker- [employer], dual chamber- NOT dependant     Transient complete heart block (H)     Sinus node dysfunction (H)     Disturbance of skin sensation     NSTEMI (non-ST elevated myocardial infarction) (H)      Arrhythmia     Sick sinus syndrome (H)     Non-rheumatic mitral regurgitation     Non-rheumatic tricuspid valve insufficiency     CKD (chronic kidney disease) stage 4, GFR 15-29 ml/min (H)     Status post coronary angiogram     Secondary renal hyperparathyroidism (H)     Thrombocytopenia (H)       Personal Hx:   Social History     Socioeconomic History     Marital status:      Spouse name: Not on file     Number of children: 4     Years of education: Not on file     Highest education level: Not on file   Occupational History     Employer: [employer]   Tobacco Use     Smoking status: Former Smoker     Packs/day: 0.30     Years: 15.00     Pack years: 4.50     Types: Cigarettes     Smokeless tobacco: Never Used     Tobacco comment: Quit 22+ years ago   Substance and Sexual Activity     Alcohol use: Not on file     Drug use: No     Sexual activity: Not Currently     Partners: Male     Birth control/protection: Post-menopausal   Other Topics Concern      Service Not Asked     Blood Transfusions Yes     Caffeine Concern Not Asked     Occupational Exposure Not Asked     Hobby Hazards Not Asked     Sleep Concern Not Asked     Stress Concern Not Asked     Weight Concern Not Asked     Special Diet Not Asked     Back Care Not Asked     Exercise No     Bike Helmet Not Asked     Seat Belt Not Asked     Self-Exams Not Asked     Parent/sibling w/ CABG, MI or angioplasty before 65F 55M? Not Asked   Social History Narrative     Not on file     Social Determinants of Health     Financial Resource Strain: Not on file   Food Insecurity: Not on file   Transportation Needs: Not on file   Physical Activity: Not on file   Stress: Not on file   Social Connections: Not on file   Intimate Partner Violence: Not on file   Housing Stability: Not on file       Allergies:  Allergies   Allergen Reactions     Bactrim [Sulfamethoxazole W/Trimethoprim] Other (See Comments)     Patient unable to recall     Biaxin  "[Clarithromycin]      Chlorthalidone Nausea and Vomiting     Clonidine      Codeine Sulfate Itching     Darvon [Propoxyphene Hcl]      Dilaudid [Hydromorphone] Visual Disturbance     Hallucinations       Gabapentin Other (See Comments) and Fatigue     \"felt drunk\"     Indocin      Levaquin [Levofloxacin Hemihydrate]      Lyrica Fatigue     Morphine Sulfate      Percocet [Oxycodone-Acetaminophen] Other (See Comments)     hallucinations     Pregabalin Itching     Other reaction(s): Fatigue     Shrimp Swelling     Spironolactone Other (See Comments)     Dehydrated       Terazosin      Ciprofloxacin Rash     Simvastatin Palpitations     Muscle weakness, leg cramping         Current Outpatient Medications   Medication     allopurinol (ZYLOPRIM) 100 MG tablet     amitriptyline (ELAVIL) 25 MG tablet     amLODIPine (NORVASC) 10 MG tablet     aspirin 81 MG tablet     budesonide (PULMICORT) 0.5 MG/2ML neb solution     clopidogrel (PLAVIX) 75 MG tablet     cyanocolbalamin (VITAMIN B-12) 1000 MCG tablet     fluticasone-salmeterol (ADVAIR) 250-50 MCG/DOSE inhaler     furosemide (LASIX) 40 MG tablet     isosorbide mononitrate (ISMO/MONOKET) 20 MG tablet     metoprolol tartrate (LOPRESSOR) 100 MG tablet     Multiple Vitamins-Minerals (PRESERVISION AREDS 2+MULTI VIT PO)     omeprazole (PRILOSEC) 40 MG DR capsule     potassium chloride ER (K-TAB/KLOR-CON) 10 MEQ CR tablet     pravastatin (PRAVACHOL) 80 MG tablet     timolol maleate (ISTALOL) 0.5 % opthalmic solution     timolol maleate (TIMOPTIC) 0.5 % ophthalmic solution     VITAMIN D3 25 MCG (1000 UT) tablet     potassium chloride ER (K-TAB/KLOR-CON) 10 MEQ CR tablet     valACYclovir (VALTREX) 1000 mg tablet     No current facility-administered medications for this visit.       Vitals:  /70   Pulse 86   Wt 75.8 kg (167 lb)   BMI 27.28 kg/m      Exam:  General: awake and alert, appears to be in no acute distress  Neuro: normal speech  Skin:nromal, some bruising  Lungs: " clear bilaterally  Extremities:++ edema      Results:  Last Comprehensive Metabolic Panel:  Sodium   Date Value Ref Range Status   06/17/2022 144 133 - 144 mmol/L Final   08/26/2020 141 133 - 144 mmol/L Final     Potassium   Date Value Ref Range Status   06/17/2022 4.6 3.4 - 5.3 mmol/L Final   08/26/2020 4.4 3.4 - 5.3 mmol/L Final     Chloride   Date Value Ref Range Status   06/17/2022 114 (H) 94 - 109 mmol/L Final   08/26/2020 111 (H) 94 - 109 mmol/L Final     Carbon Dioxide   Date Value Ref Range Status   08/26/2020 24 20 - 32 mmol/L Final     Carbon Dioxide (CO2)   Date Value Ref Range Status   06/17/2022 26 20 - 32 mmol/L Final     Anion Gap   Date Value Ref Range Status   06/17/2022 4 3 - 14 mmol/L Final   08/26/2020 8 3 - 14 mmol/L Final     Glucose   Date Value Ref Range Status   06/17/2022 87 70 - 99 mg/dL Final   08/26/2020 84 70 - 99 mg/dL Final     Urea Nitrogen   Date Value Ref Range Status   06/17/2022 32 (H) 7 - 30 mg/dL Final   08/26/2020 41 (H) 7 - 30 mg/dL Final     Creatinine   Date Value Ref Range Status   06/17/2022 1.69 (H) 0.52 - 1.04 mg/dL Final   08/26/2020 1.70 (H) 0.52 - 1.04 mg/dL Final     GFR Estimate   Date Value Ref Range Status   06/17/2022 29 (L) >60 mL/min/1.73m2 Final     Comment:     Effective December 21, 2021 eGFRcr in adults is calculated using the 2021 CKD-EPI creatinine equation which includes age and gender (Madhavi et al., NEJM, DOI: 10.1056/HAGAla6492889)   08/26/2020 27 (L) >60 mL/min/[1.73_m2] Final     Comment:     Non  GFR Calc  Starting 12/18/2018, serum creatinine based estimated GFR (eGFR) will be   calculated using the Chronic Kidney Disease Epidemiology Collaboration   (CKD-EPI) equation.       Calcium   Date Value Ref Range Status   06/17/2022 8.9 8.5 - 10.1 mg/dL Final   08/26/2020 9.0 8.5 - 10.1 mg/dL Final       Last Basic Metabolic Panel:  Lab Results   Component Value Date     11/08/2017      Lab Results   Component Value Date     POTASSIUM 3.5 11/08/2017     Lab Results   Component Value Date    CHLORIDE 104 11/08/2017     Lab Results   Component Value Date    ALEXANDRO 8.8 11/08/2017     Lab Results   Component Value Date    CO2 26 11/08/2017     Lab Results   Component Value Date    BUN 54 11/08/2017     Lab Results   Component Value Date    CR 2.36 11/08/2017     Lab Results   Component Value Date    GLC 88 11/08/2017       Marzena Martin MD   of Medicine  Department of Nephrology  [facility]

## 2022-08-09 LAB
ALBUMIN MFR UR ELPH: 7 MG/DL
ALBUMIN SERPL-MCNC: 3.6 G/DL (ref 3.4–5)
ANION GAP SERPL CALCULATED.3IONS-SCNC: 7 MMOL/L (ref 3–14)
BUN SERPL-MCNC: 46 MG/DL (ref 7–30)
CALCIUM SERPL-MCNC: 9.2 MG/DL (ref 8.5–10.1)
CHLORIDE BLD-SCNC: 110 MMOL/L (ref 94–109)
CO2 SERPL-SCNC: 23 MMOL/L (ref 20–32)
CREAT SERPL-MCNC: 2.21 MG/DL (ref 0.52–1.04)
CREAT UR-MCNC: 17.5 MG/DL
ERYTHROCYTE [DISTWIDTH] IN BLOOD BY AUTOMATED COUNT: 15.9 % (ref 10–15)
GFR SERPL CREATININE-BSD FRML MDRD: 21 ML/MIN/1.73M2
GLUCOSE BLD-MCNC: 99 MG/DL (ref 70–99)
HCT VFR BLD AUTO: 35.8 % (ref 35–47)
HGB BLD-MCNC: 11.5 G/DL (ref 11.7–15.7)
MAGNESIUM SERPL-MCNC: 2.1 MG/DL (ref 1.6–2.3)
MCH RBC QN AUTO: 30.5 PG (ref 26.5–33)
MCHC RBC AUTO-ENTMCNC: 32.1 G/DL (ref 31.5–36.5)
MCV RBC AUTO: 95 FL (ref 78–100)
PHOSPHATE SERPL-MCNC: 3.7 MG/DL (ref 2.5–4.5)
PLATELET # BLD AUTO: 66 10E3/UL (ref 150–450)
POTASSIUM BLD-SCNC: 4.5 MMOL/L (ref 3.4–5.3)
PROT/CREAT 24H UR: 0.4 MG/MG CR (ref 0–0.2)
RBC # BLD AUTO: 3.77 10E6/UL (ref 3.8–5.2)
SODIUM SERPL-SCNC: 140 MMOL/L (ref 133–144)
WBC # BLD AUTO: 6.6 10E3/UL (ref 4–11)

## 2022-08-15 DIAGNOSIS — E78.5 HYPERLIPIDEMIA LDL GOAL <130: ICD-10-CM

## 2022-08-18 RX ORDER — PRAVASTATIN SODIUM 80 MG/1
80 TABLET ORAL DAILY
Qty: 90 TABLET | Refills: 3 | Status: ON HOLD | OUTPATIENT
Start: 2022-08-18 | End: 2023-07-17

## 2022-08-18 NOTE — TELEPHONE ENCOUNTER
Pravastatin Sodium 80 MG Oral Tablet  Last Written Prescription Date:   8/11/2021  Last Fill Quantity: 90,   # refills: 3  Last Office Visit :  7/21/2022  Future Office visit:  None  90 Tabs, 3 Refills sent to pharm 8/18/2022    Uma Pool RN  Central Triage Red Flags/Med Refills    Warnings Override History for pravastatin (PRAVACHOL) 80 MG tablet [[id number]]    Overridden by Uma Pool RN on Aug 11, 2021 12:25 PM   Allergy/Contraindication   1. SIMVASTATIN [Level: Drug Class Match] [Reason: Tolerated medication/side effects in past]             .

## 2022-08-19 ENCOUNTER — ANCILLARY PROCEDURE (OUTPATIENT)
Dept: CARDIOLOGY | Facility: CLINIC | Age: 87
End: 2022-08-19
Attending: INTERNAL MEDICINE
Payer: COMMERCIAL

## 2022-08-19 DIAGNOSIS — I49.5 SICK SINUS SYNDROME (H): ICD-10-CM

## 2022-08-19 PROCEDURE — 93294 REM INTERROG EVL PM/LDLS PM: CPT | Performed by: INTERNAL MEDICINE

## 2022-08-19 PROCEDURE — 93296 REM INTERROG EVL PM/IDS: CPT

## 2022-08-22 LAB
MDC_IDC_LEAD_IMPLANT_DT: NORMAL
MDC_IDC_LEAD_IMPLANT_DT: NORMAL
MDC_IDC_LEAD_LOCATION: NORMAL
MDC_IDC_LEAD_LOCATION: NORMAL
MDC_IDC_LEAD_LOCATION_DETAIL_1: NORMAL
MDC_IDC_LEAD_LOCATION_DETAIL_1: NORMAL
MDC_IDC_LEAD_MFG: NORMAL
MDC_IDC_LEAD_MFG: NORMAL
MDC_IDC_LEAD_MODEL: NORMAL
MDC_IDC_LEAD_MODEL: NORMAL
MDC_IDC_LEAD_POLARITY_TYPE: NORMAL
MDC_IDC_LEAD_POLARITY_TYPE: NORMAL
MDC_IDC_LEAD_SERIAL: NORMAL
MDC_IDC_LEAD_SERIAL: NORMAL
MDC_IDC_LEAD_SPECIAL_FUNCTION: NORMAL
MDC_IDC_LEAD_SPECIAL_FUNCTION: NORMAL
MDC_IDC_MSMT_BATTERY_DTM: NORMAL
MDC_IDC_MSMT_BATTERY_REMAINING_LONGEVITY: 127 MO
MDC_IDC_MSMT_BATTERY_RRT_TRIGGER: 2.62
MDC_IDC_MSMT_BATTERY_STATUS: NORMAL
MDC_IDC_MSMT_BATTERY_VOLTAGE: 3.02 V
MDC_IDC_MSMT_LEADCHNL_RA_IMPEDANCE_VALUE: 285 OHM
MDC_IDC_MSMT_LEADCHNL_RA_IMPEDANCE_VALUE: 323 OHM
MDC_IDC_MSMT_LEADCHNL_RA_PACING_THRESHOLD_AMPLITUDE: 0.5 V
MDC_IDC_MSMT_LEADCHNL_RA_PACING_THRESHOLD_PULSEWIDTH: 0.4 MS
MDC_IDC_MSMT_LEADCHNL_RA_SENSING_INTR_AMPL: 2.3 MV
MDC_IDC_MSMT_LEADCHNL_RV_IMPEDANCE_VALUE: 380 OHM
MDC_IDC_MSMT_LEADCHNL_RV_IMPEDANCE_VALUE: 418 OHM
MDC_IDC_MSMT_LEADCHNL_RV_PACING_THRESHOLD_AMPLITUDE: 0.88 V
MDC_IDC_MSMT_LEADCHNL_RV_PACING_THRESHOLD_PULSEWIDTH: 0.4 MS
MDC_IDC_MSMT_LEADCHNL_RV_SENSING_INTR_AMPL: 9.1 MV
MDC_IDC_PG_IMPLANT_DTM: NORMAL
MDC_IDC_PG_MFG: NORMAL
MDC_IDC_PG_MODEL: NORMAL
MDC_IDC_PG_SERIAL: NORMAL
MDC_IDC_PG_TYPE: NORMAL
MDC_IDC_SESS_CLINIC_NAME: NORMAL
MDC_IDC_SESS_DTM: NORMAL
MDC_IDC_SESS_TYPE: NORMAL
MDC_IDC_SET_BRADY_AT_MODE_SWITCH_RATE: 171 {BEATS}/MIN
MDC_IDC_SET_BRADY_HYSTRATE: NORMAL
MDC_IDC_SET_BRADY_LOWRATE: 60 {BEATS}/MIN
MDC_IDC_SET_BRADY_MAX_SENSOR_RATE: 130 {BEATS}/MIN
MDC_IDC_SET_BRADY_MAX_TRACKING_RATE: 130 {BEATS}/MIN
MDC_IDC_SET_BRADY_MODE: NORMAL
MDC_IDC_SET_BRADY_PAV_DELAY_LOW: 180 MS
MDC_IDC_SET_BRADY_SAV_DELAY_LOW: 150 MS
MDC_IDC_SET_LEADCHNL_RA_PACING_AMPLITUDE: 1.5 V
MDC_IDC_SET_LEADCHNL_RA_PACING_ANODE_ELECTRODE_1: NORMAL
MDC_IDC_SET_LEADCHNL_RA_PACING_ANODE_LOCATION_1: NORMAL
MDC_IDC_SET_LEADCHNL_RA_PACING_CAPTURE_MODE: NORMAL
MDC_IDC_SET_LEADCHNL_RA_PACING_CATHODE_ELECTRODE_1: NORMAL
MDC_IDC_SET_LEADCHNL_RA_PACING_CATHODE_LOCATION_1: NORMAL
MDC_IDC_SET_LEADCHNL_RA_PACING_POLARITY: NORMAL
MDC_IDC_SET_LEADCHNL_RA_PACING_PULSEWIDTH: 0.4 MS
MDC_IDC_SET_LEADCHNL_RA_SENSING_ANODE_ELECTRODE_1: NORMAL
MDC_IDC_SET_LEADCHNL_RA_SENSING_ANODE_LOCATION_1: NORMAL
MDC_IDC_SET_LEADCHNL_RA_SENSING_CATHODE_ELECTRODE_1: NORMAL
MDC_IDC_SET_LEADCHNL_RA_SENSING_CATHODE_LOCATION_1: NORMAL
MDC_IDC_SET_LEADCHNL_RA_SENSING_POLARITY: NORMAL
MDC_IDC_SET_LEADCHNL_RA_SENSING_SENSITIVITY: 0.3 MV
MDC_IDC_SET_LEADCHNL_RV_PACING_AMPLITUDE: 2 V
MDC_IDC_SET_LEADCHNL_RV_PACING_ANODE_ELECTRODE_1: NORMAL
MDC_IDC_SET_LEADCHNL_RV_PACING_ANODE_LOCATION_1: NORMAL
MDC_IDC_SET_LEADCHNL_RV_PACING_CAPTURE_MODE: NORMAL
MDC_IDC_SET_LEADCHNL_RV_PACING_CATHODE_ELECTRODE_1: NORMAL
MDC_IDC_SET_LEADCHNL_RV_PACING_CATHODE_LOCATION_1: NORMAL
MDC_IDC_SET_LEADCHNL_RV_PACING_POLARITY: NORMAL
MDC_IDC_SET_LEADCHNL_RV_PACING_PULSEWIDTH: 0.4 MS
MDC_IDC_SET_LEADCHNL_RV_SENSING_ANODE_ELECTRODE_1: NORMAL
MDC_IDC_SET_LEADCHNL_RV_SENSING_ANODE_LOCATION_1: NORMAL
MDC_IDC_SET_LEADCHNL_RV_SENSING_CATHODE_ELECTRODE_1: NORMAL
MDC_IDC_SET_LEADCHNL_RV_SENSING_CATHODE_LOCATION_1: NORMAL
MDC_IDC_SET_LEADCHNL_RV_SENSING_POLARITY: NORMAL
MDC_IDC_SET_LEADCHNL_RV_SENSING_SENSITIVITY: 0.9 MV
MDC_IDC_SET_ZONE_DETECTION_INTERVAL: 350 MS
MDC_IDC_SET_ZONE_DETECTION_INTERVAL: 400 MS
MDC_IDC_SET_ZONE_TYPE: NORMAL
MDC_IDC_STAT_AT_BURDEN_PERCENT: 0 %
MDC_IDC_STAT_AT_DTM_END: NORMAL
MDC_IDC_STAT_AT_DTM_START: NORMAL
MDC_IDC_STAT_BRADY_AP_VP_PERCENT: 0.04 %
MDC_IDC_STAT_BRADY_AP_VS_PERCENT: 75.73 %
MDC_IDC_STAT_BRADY_AS_VP_PERCENT: 0.02 %
MDC_IDC_STAT_BRADY_AS_VS_PERCENT: 24.21 %
MDC_IDC_STAT_BRADY_DTM_END: NORMAL
MDC_IDC_STAT_BRADY_DTM_START: NORMAL
MDC_IDC_STAT_BRADY_RA_PERCENT_PACED: 78.15 %
MDC_IDC_STAT_BRADY_RV_PERCENT_PACED: 0.06 %
MDC_IDC_STAT_EPISODE_RECENT_COUNT: 0
MDC_IDC_STAT_EPISODE_RECENT_COUNT_DTM_END: NORMAL
MDC_IDC_STAT_EPISODE_RECENT_COUNT_DTM_START: NORMAL
MDC_IDC_STAT_EPISODE_TOTAL_COUNT: 0
MDC_IDC_STAT_EPISODE_TOTAL_COUNT: 1
MDC_IDC_STAT_EPISODE_TOTAL_COUNT_DTM_END: NORMAL
MDC_IDC_STAT_EPISODE_TOTAL_COUNT_DTM_START: NORMAL
MDC_IDC_STAT_EPISODE_TYPE: NORMAL

## 2022-09-13 ENCOUNTER — OFFICE VISIT (OUTPATIENT)
Dept: PULMONOLOGY | Facility: CLINIC | Age: 87
End: 2022-09-13
Attending: INTERNAL MEDICINE
Payer: COMMERCIAL

## 2022-09-13 VITALS — DIASTOLIC BLOOD PRESSURE: 81 MMHG | OXYGEN SATURATION: 98 % | HEART RATE: 80 BPM | SYSTOLIC BLOOD PRESSURE: 159 MMHG

## 2022-09-13 DIAGNOSIS — R05.3 CHRONIC COUGH: Primary | ICD-10-CM

## 2022-09-13 DIAGNOSIS — K21.9 GASTROESOPHAGEAL REFLUX DISEASE WITHOUT ESOPHAGITIS: ICD-10-CM

## 2022-09-13 PROCEDURE — G0463 HOSPITAL OUTPT CLINIC VISIT: HCPCS

## 2022-09-13 PROCEDURE — 99214 OFFICE O/P EST MOD 30 MIN: CPT | Performed by: INTERNAL MEDICINE

## 2022-09-13 ASSESSMENT — PAIN SCALES - GENERAL: PAINLEVEL: NO PAIN (0)

## 2022-09-13 ASSESSMENT — ENCOUNTER SYMPTOMS
WHEEZING: 0
SHORTNESS OF BREATH: 0
SPUTUM PRODUCTION: 0
HEMOPTYSIS: 0
COUGH: 0

## 2022-09-13 NOTE — PROGRESS NOTES
Pulmonology Clinic Follow up Visit       Tessa Mansfield MRN# [id number]   YOB: 1935 Age: 87 year old     Date of Visit: 9/13/2022    Reason for Visit: Follow-up chronic cough          Assessment and Plan:     ## Chronic cough - resolved  ## Probable cough variant asthma  See my previous note from 6/15/2022 for detailed history.  In brief this patient developed chronic cough potentially related to COVID infection which was responsive to prednisone but returned after prednisone was completed.  Extensive evaluation including PFTs and imaging were unrevealing.  She was treated with LABA ICS without success so nebulized Pulmicort was added at her last visit.  Since starting that her cough is completely resolved.  It is unclear whether or not she has a baseline issue causing this cough so we will attempt to reduce her therapy.  Instructed her to stop the Pulmicort and watch her symptoms.  If her cough returns she can restart the Pulmicort, but if her cough remains resolved after 2 to 3 weeks she will then discontinue the Dulera as well.          ## Pulmonary nodules  Multiple incidental identified subpleural nodules, largest which measures 8 mm.  -Following with pulmonary nodule clinic      Return to clinic: As needed          Chad Blair M.D.  Pulmonary & Critical Care  Pager: Click Here to page          History of Present Illness / Interval History:     Tessa Mansfield is a 87 year old female with H/O coronary artery disease with pacemaker and history of Covid in 2021 who presents to follow-up chronic dry cough present since spring 2021.    Last seen: 6/15/2022    Historic  Chronic cough since spring 2021.  No clear inciting event, though onset was a few weeks after having had COVID.  She did not have any respiratory symptoms during her COVID infection.  The cough predominantly dry, though she does occasionally produce thick mucus.  She has had a great response to 2 courses of prednisone, however  her symptoms have returned approximately 2 to 3 weeks after completing.  She was started on dulera without improvement so pulmicort was added at her last visit.     Since she was last seen her cough has completely resolved. She is able to exercise without difficulty. She continues to deny GERD, fevers, rigors, malaise, or fatigue.  She is excited about the birth of multiple grandchildren with her grandson in August, twins earlier this fall, and she is expecting a great grandchild in January.                Review of Systems:     Review of Systems   Respiratory: Negative for cough, hemoptysis, sputum production, shortness of breath and wheezing.             Physical Examination:     BP (!) 159/81 (BP Location: Right arm, Patient Position: Chair, Cuff Size: Adult Regular)   Pulse 80   SpO2 98%     Physical Exam  Vitals and nursing note reviewed.   Constitutional:       Appearance: Normal appearance.   Cardiovascular:      Rate and Rhythm: Normal rate and regular rhythm.   Pulmonary:      Effort: Pulmonary effort is normal.      Breath sounds: Normal breath sounds. No wheezing, rhonchi or rales.   Neurological:      General: No focal deficit present.      Mental Status: She is alert and oriented to person, place, and time.       Fingernails without clubbing        Data:     CT chest 2/7/2022  Resolution of previously millimeter right middle lobe  nodule an area of previous right lower lobe groundglass opacity no  showing subsegmental atelectasis. No new nodule. Right lower lung  subsegmental atelectasis and/or scarring.    CT chest 9/29/2021  1. No acute consolidative opacity.  2. Nonspecific subpleural nodules in the right lower lobe. Recommend  follow-up CT in 6-12 months with special attention to the 8   millimeter right lower lobe groundglass opacity.      All studies listed here were independently reviewed and interpreted by me today.         Medications:     Current Outpatient Medications   Medication      allopurinol (ZYLOPRIM) 100 MG tablet     amitriptyline (ELAVIL) 25 MG tablet     amLODIPine (NORVASC) 10 MG tablet     aspirin 81 MG tablet     budesonide (PULMICORT) 0.5 MG/2ML neb solution     clopidogrel (PLAVIX) 75 MG tablet     cyanocolbalamin (VITAMIN B-12) 1000 MCG tablet     fluticasone-salmeterol (ADVAIR) 250-50 MCG/DOSE inhaler     furosemide (LASIX) 40 MG tablet     isosorbide mononitrate (ISMO/MONOKET) 20 MG tablet     metoprolol tartrate (LOPRESSOR) 100 MG tablet     Multiple Vitamins-Minerals (PRESERVISION AREDS 2+MULTI VIT PO)     omeprazole (PRILOSEC) 40 MG DR capsule     potassium chloride ER (K-TAB/KLOR-CON) 10 MEQ CR tablet     pravastatin (PRAVACHOL) 80 MG tablet     timolol maleate (ISTALOL) 0.5 % opthalmic solution     timolol maleate (TIMOPTIC) 0.5 % ophthalmic solution     valACYclovir (VALTREX) 1000 mg tablet     VITAMIN D3 25 MCG (1000 UT) tablet     No current facility-administered medications for this visit.             The above note was dictated using voice recognition software and may include typographical errors. Please contact the author for any clarifications.

## 2022-09-13 NOTE — NURSING NOTE
Chief Complaint   Patient presents with     Follow Up     Follow up appt      Vitals were taken and medications were reconciled.    Nay Bustos RMA  12:28 PM

## 2022-09-13 NOTE — LETTER
9/13/2022         RE: Tessa Mansfield  [address]  [facility] [address]        Dear Colleague,    Thank you for referring your patient, Tessa Mansfield, to the [facility]. Please see a copy of my visit note below.    Pulmonology Clinic Follow up Visit       Tessa Mansfield MRN# [id number]   YOB: 1935 Age: 87 year old     Date of Visit: 9/13/2022    Reason for Visit: Follow-up chronic cough          Assessment and Plan:     ## Chronic cough - resolved  ## Probable cough variant asthma  See my previous note from 6/15/2022 for detailed history.  In brief this patient developed chronic cough potentially related to COVID infection which was responsive to prednisone but returned after prednisone was completed.  Extensive evaluation including PFTs and imaging were unrevealing.  She was treated with LABA ICS without success so nebulized Pulmicort was added at her last visit.  Since starting that her cough is completely resolved.  It is unclear whether or not she has a baseline issue causing this cough so we will attempt to reduce her therapy.  Instructed her to stop the Pulmicort and watch her symptoms.  If her cough returns she can restart the Pulmicort, but if her cough remains resolved after 2 to 3 weeks she will then discontinue the Dulera as well.          ## Pulmonary nodules  Multiple incidental identified subpleural nodules, largest which measures 8 mm.  -Following with pulmonary nodule clinic      Return to clinic: As needed          Chad Blair M.D.  Pulmonary & Critical Care  Pager: Click Here to page          History of Present Illness / Interval History:     Tessa Mansfield is a 87 year old female with H/O coronary artery disease with pacemaker and history of Covid in 2021 who presents to follow-up chronic dry cough present since spring 2021.    Last seen: 6/15/2022    Historic  Chronic cough since spring 2021.  No  clear inciting event, though onset was a few weeks after having had COVID.  She did not have any respiratory symptoms during her COVID infection.  The cough predominantly dry, though she does occasionally produce thick mucus.  She has had a great response to 2 courses of prednisone, however her symptoms have returned approximately 2 to 3 weeks after completing.  She was started on dulera without improvement so pulmicort was added at her last visit.     Since she was last seen her cough has completely resolved. She is able to exercise without difficulty. She continues to deny GERD, fevers, rigors, malaise, or fatigue.  She is excited about the birth of multiple grandchildren with her grandson in August, twins earlier this fall, and she is expecting a great grandchild in January.                Review of Systems:     Review of Systems   Respiratory: Negative for cough, hemoptysis, sputum production, shortness of breath and wheezing.             Physical Examination:     BP (!) 159/81 (BP Location: Right arm, Patient Position: Chair, Cuff Size: Adult Regular)   Pulse 80   SpO2 98%     Physical Exam  Vitals and nursing note reviewed.   Constitutional:       Appearance: Normal appearance.   Cardiovascular:      Rate and Rhythm: Normal rate and regular rhythm.   Pulmonary:      Effort: Pulmonary effort is normal.      Breath sounds: Normal breath sounds. No wheezing, rhonchi or rales.   Neurological:      General: No focal deficit present.      Mental Status: She is alert and oriented to person, place, and time.       Fingernails without clubbing        Data:     CT chest 2/7/2022  Resolution of previously millimeter right middle lobe  nodule an area of previous right lower lobe groundglass opacity no  showing subsegmental atelectasis. No new nodule. Right lower lung  subsegmental atelectasis and/or scarring.    CT chest 9/29/2021  1. No acute consolidative opacity.  2. Nonspecific subpleural nodules in the right lower  lobe. Recommend  follow-up CT in 6-12 months with special attention to the 8   millimeter right lower lobe groundglass opacity.      All studies listed here were independently reviewed and interpreted by me today.         Medications:     Current Outpatient Medications   Medication     allopurinol (ZYLOPRIM) 100 MG tablet     amitriptyline (ELAVIL) 25 MG tablet     amLODIPine (NORVASC) 10 MG tablet     aspirin 81 MG tablet     budesonide (PULMICORT) 0.5 MG/2ML neb solution     clopidogrel (PLAVIX) 75 MG tablet     cyanocolbalamin (VITAMIN B-12) 1000 MCG tablet     fluticasone-salmeterol (ADVAIR) 250-50 MCG/DOSE inhaler     furosemide (LASIX) 40 MG tablet     isosorbide mononitrate (ISMO/MONOKET) 20 MG tablet     metoprolol tartrate (LOPRESSOR) 100 MG tablet     Multiple Vitamins-Minerals (PRESERVISION AREDS 2+MULTI VIT PO)     omeprazole (PRILOSEC) 40 MG DR capsule     potassium chloride ER (K-TAB/KLOR-CON) 10 MEQ CR tablet     pravastatin (PRAVACHOL) 80 MG tablet     timolol maleate (ISTALOL) 0.5 % opthalmic solution     timolol maleate (TIMOPTIC) 0.5 % ophthalmic solution     valACYclovir (VALTREX) 1000 mg tablet     VITAMIN D3 25 MCG (1000 UT) tablet     No current facility-administered medications for this visit.       The above note was dictated using voice recognition software and may include typographical errors. Please contact the author for any clarifications.    Again, thank you for allowing me to participate in the care of your patient.        Sincerely,        Chad Blair MD

## 2022-09-13 NOTE — PATIENT INSTRUCTIONS
Try stopping the nebulized Pulmicort. If your cough returns restart the Pulmicort. If the cough doesn't return after 2-3 weeks then stop the dulera.

## 2022-09-15 RX ORDER — OMEPRAZOLE 40 MG/1
40 CAPSULE, DELAYED RELEASE ORAL DAILY
Qty: 90 CAPSULE | Refills: 3 | Status: SHIPPED | OUTPATIENT
Start: 2022-09-15 | End: 2023-08-31

## 2022-10-18 DIAGNOSIS — E87.6 HYPOKALEMIA: ICD-10-CM

## 2022-10-19 RX ORDER — POTASSIUM CHLORIDE 750 MG/1
TABLET, EXTENDED RELEASE ORAL
Qty: 270 TABLET | Refills: 0 | Status: SHIPPED | OUTPATIENT
Start: 2022-10-19 | End: 2023-01-26

## 2022-11-15 NOTE — PATIENT INSTRUCTIONS
1. Your kidney function is stable  2. We will increase Lasix dose to 40 mg twice daily. Take one tablet in the morning, then one tablet in afternoon  3. Monitor your blood pressure daily and keep records for us to review. Please call nephrology clinic if your blood pressure is persistently above 140/90  4. We will repeat labs in 2 weeks  5. Follow up with nephrology clinic in 3 months   A responsible adult should stay with you and you should rest quietly for the rest of the day.    Do not drink alcohol, drive operate any heavy machinery or power tools or make any legal/important decisions for the next 24 hours.    Progress your diet as tolerated.  If you begin to experience severe pain, increased shortness of breath, racing heartbeat or a fever above 101F, seek immediate medical attention.     Follow up with MD as instructed.  Call office for results in 3 to 5 days if needed.    Impression:  1.  Patchy white plaques throughout the esophagus consistent with esophagitis, less typical for Candida esophagitis.  Biopsies were obtained with cold forceps for histology.  There were no esophageal varices seen.  2.  Patchy erythema and granularity in the stomach antrum and body consistent with erosive gastritis vs PHG.  Biopsies were obtained with cold forceps for histology.  There were no gastric varices seen  3.  Patchy erythema in the duodenal bulb consistent with duodenitis.     Colonoscopy impression:  1.  10 colon polyps between 4 to 6 mm in diameter throughout the colon removed with snare polypectomy in a single piece and were completely removed.  In the ascending colon 2 polyps, transverse colon 3 polyps, descending colon 4 polyps, rectum 1 polyp.  Several small diminutive hyperplastic appearing polyps in the rectum which were not removed.  2.  Moderate diverticulosis in the descending and sigmoid colon with small and medium size openings and no bleeding  3.  Small nonbleeding internal hemorrhoids.  4.  Normal mucosa in the terminal ileum  5.  Patchy erythema and mosaic pattern mucosa spread throughout the colon consistent with mild portal hypertensive colopathy     Recommendations:  -Repeat EGD for esophageal varices screening in 2 years  -Recall for colonoscopy in 3 years if 3 or more adenomatous colon polyps  -Follow-up gastric biopsies for H. pylori, treat with quadruple therapy if  positive  -Recommend PPI once daily  -Consider nadolol  -Polyp prevention education        BRENDA Garcia MD      Date: 11/15/2022    Time: 10:39 EST

## 2022-11-18 ENCOUNTER — TELEPHONE (OUTPATIENT)
Dept: NEPHROLOGY | Facility: CLINIC | Age: 87
End: 2022-11-18

## 2022-11-18 DIAGNOSIS — N18.4 CKD (CHRONIC KIDNEY DISEASE) STAGE 4, GFR 15-29 ML/MIN (H): Primary | ICD-10-CM

## 2022-11-18 NOTE — TELEPHONE ENCOUNTER
Patient contacted regarding to scheduling a lab appointment prior to visit with Dr Martin. Patient scheduled for 1:30 on 11/21 for labs.  CAROL Graham

## 2022-11-21 ENCOUNTER — LAB (OUTPATIENT)
Dept: LAB | Facility: CLINIC | Age: 87
End: 2022-11-21
Payer: COMMERCIAL

## 2022-11-21 ENCOUNTER — OFFICE VISIT (OUTPATIENT)
Dept: NEPHROLOGY | Facility: CLINIC | Age: 87
End: 2022-11-21
Payer: COMMERCIAL

## 2022-11-21 VITALS
WEIGHT: 173 LBS | SYSTOLIC BLOOD PRESSURE: 135 MMHG | BODY MASS INDEX: 28.26 KG/M2 | DIASTOLIC BLOOD PRESSURE: 76 MMHG | HEART RATE: 76 BPM

## 2022-11-21 DIAGNOSIS — N18.4 CKD (CHRONIC KIDNEY DISEASE) STAGE 4, GFR 15-29 ML/MIN (H): ICD-10-CM

## 2022-11-21 DIAGNOSIS — D69.6 LOW PLATELET COUNT (H): Primary | ICD-10-CM

## 2022-11-21 DIAGNOSIS — I10 ESSENTIAL HYPERTENSION: ICD-10-CM

## 2022-11-21 DIAGNOSIS — I25.118 CORONARY ARTERY DISEASE OF NATIVE ARTERY OF NATIVE HEART WITH STABLE ANGINA PECTORIS (H): ICD-10-CM

## 2022-11-21 LAB
HGB BLD-MCNC: 11 G/DL (ref 11.7–15.7)
PTH-INTACT SERPL-MCNC: 102 PG/ML (ref 15–65)

## 2022-11-21 PROCEDURE — 99214 OFFICE O/P EST MOD 30 MIN: CPT | Performed by: INTERNAL MEDICINE

## 2022-11-21 PROCEDURE — 36415 COLL VENOUS BLD VENIPUNCTURE: CPT

## 2022-11-21 PROCEDURE — 83540 ASSAY OF IRON: CPT

## 2022-11-21 PROCEDURE — 83970 ASSAY OF PARATHORMONE: CPT

## 2022-11-21 PROCEDURE — 85027 COMPLETE CBC AUTOMATED: CPT | Performed by: INTERNAL MEDICINE

## 2022-11-21 PROCEDURE — 84156 ASSAY OF PROTEIN URINE: CPT

## 2022-11-21 PROCEDURE — 83550 IRON BINDING TEST: CPT

## 2022-11-21 PROCEDURE — 80069 RENAL FUNCTION PANEL: CPT

## 2022-11-21 NOTE — LETTER
11/21/2022       RE: Tessa Mansfield  [address]  [facility] [address]     Dear Colleague,    Thank you for referring your patient, Tessa Mansfield, to the [facility] at [facility]. Please see a copy of my visit note below.    Assessment and Plan:   87 year old female with history of long standing HTN, CAD s/p multiple stents, who presents for followup of CKD stage 3, baseline SCr 1.8-2.2 mg/dL without proteinuria. She had another angiogram with stent done June 2022.    1. CKD stage 3- baseline SCr 1.8-2.2mg/ dL, eGFR 25-29 ml/min. Mild/ minimal proteinuria    - had angiogram in June 2022, no labs since, will do renal panel today  Her swelling previously occasional and she would ttake an extra furosemide but typically only takes furosemide 40mg once a day however now has more swelling since being on amlodipine 10mg for better BP control   - will have her incrase furosemide to BID for 5-7 days and reassess, may benefit from lowering amlodipine to 5mg  - monitor labs, relatively stable at this time,     2. Electrolytes/Acid Base status- normal.    Hypokalemia- takes 3 potassium pills daily   - if she has more cramps, should let us know and eat high K diet.  - check Magnesium    3. Hypertension/Volume status-  She is up a little and swelling is also worse. She is on metoprolol 100 mg BID, furosemide 40 mg once a day with an extra pill prn,.   - on amlodipine 10mg she has more swelling- monitor  -patient reports history of reaction/iontolerance to Spironolactone, Chlorthalidone, Clonidine and Terazosin in the past   - takes prn furosemide in the afternoon, and BP is close to goal.     4. Anemia- previously improved. Last Hgb was in normal range. Iron studies were not recently checked  - low platelet count- down to 60s- add on to hgb today, refer to hematology if still low- no clear reason    5. BMD  - last serum calcium and phosphorus were  normal  -secondary hyperparathyroidism-PTH was 184. Patient had PTH of 144 in 11/17. We will montor    Assessment and plan was discussed with patient and she voiced her understanding and agreement.        Reason for Visit:  Tessa Mansfield is an 87 year old female with HTN, who presents for CKD management.     HPI:  She is a pleasant female with PMMHx significant for stage III CKD presumed secondary to hypertensive nephrosclerosis.Her renal function has been  relatively stable, ranging from 1.8-2.2 mg/dL over the years. She has history of CAD s/p multiple stents  (8 stents in all) since 2004 and a pacemaker in 2007. She follows with cardiologist Dr Santoyo and Dr Zhu.  Since her last visit she has been ok. She was having more dyspnea on exertion and had an angiogram and stent placed in June 2022. Her creatinine was fairly stable at thie time.    Her blood pressure had been running higher and amlodipine was started and increased to 10mg.  She has had more swelling in lower extremities with this. She is taking furosemide 40mg in AM, rarely in PM   She is moderating salt intake. Her blood pressure has improved with amlodipine but swelling is bothersome    She did take furosemide twice a day for a while , and now takes it prn, usually once or twice a week.  We discussed her low platelet count. She denies any bleeding, liver issues.  She feels well overall, good energy and no new medical issues in recent months.    Home BP: 130 range    Baseline Cr: 1.8-2.2    ROS:  A comprehensive review of systems was obtained and negative, except as noted in the HPI or PMH.    Active Medical Problems:  Patient Active Problem List   Diagnosis     Degeneration of cervical intervertebral disc     Nonallopathic lesion of cervical region     Nonallopathic lesion of thoracic region     Coronary artery disease of native artery of native heart with stable angina pectoris (H)     Dizziness and giddiness     Edema     Esophageal reflux      Gout     Essential hypertension     Obstructive sleep apnea     Osteomalacia     Post-menopausal osteoporosis     Cardiac Pacemaker- Medtronic, dual chamber- NOT dependant     Transient complete heart block (H)     Sinus node dysfunction (H)     Disturbance of skin sensation     NSTEMI (non-ST elevated myocardial infarction) (H)     Arrhythmia     Sick sinus syndrome (H)     Non-rheumatic mitral regurgitation     Non-rheumatic tricuspid valve insufficiency     CKD (chronic kidney disease) stage 4, GFR 15-29 ml/min (H)     Status post coronary angiogram     Secondary renal hyperparathyroidism (H)     Thrombocytopenia (H)       Personal Hx:   Social History     Socioeconomic History     Marital status:      Spouse name: Not on file     Number of children: 4     Years of education: Not on file     Highest education level: Not on file   Occupational History     Employer: [employer]   Tobacco Use     Smoking status: Former     Packs/day: 0.30     Years: 15.00     Pack years: 4.50     Types: Cigarettes     Smokeless tobacco: Never     Tobacco comments:     Quit 22+ years ago   Substance and Sexual Activity     Alcohol use: Not on file     Drug use: No     Sexual activity: Not Currently     Partners: Male     Birth control/protection: Post-menopausal   Other Topics Concern      Service Not Asked     Blood Transfusions Yes     Caffeine Concern Not Asked     Occupational Exposure Not Asked     Hobby Hazards Not Asked     Sleep Concern Not Asked     Stress Concern Not Asked     Weight Concern Not Asked     Special Diet Not Asked     Back Care Not Asked     Exercise No     Bike Helmet Not Asked     Seat Belt Not Asked     Self-Exams Not Asked     Parent/sibling w/ CABG, MI or angioplasty before 65F 55M? Not Asked   Social History Narrative     Not on file     Social Determinants of Health     Financial Resource Strain: Not on file   Food Insecurity: Not on file   Transportation Needs: Not on  "file   Physical Activity: Not on file   Stress: Not on file   Social Connections: Not on file   Intimate Partner Violence: Not on file   Housing Stability: Not on file       Allergies:  Allergies   Allergen Reactions     Bactrim [Sulfamethoxazole W/Trimethoprim] Other (See Comments)     Patient unable to recall     Biaxin [Clarithromycin]      Chlorthalidone Nausea and Vomiting     Clonidine      Codeine Sulfate Itching     Darvon [Propoxyphene Hcl]      Dilaudid [Hydromorphone] Visual Disturbance     Hallucinations       Gabapentin Other (See Comments) and Fatigue     \"felt drunk\"     Indocin      Levaquin [Levofloxacin Hemihydrate]      Lyrica Fatigue     Morphine Sulfate      Percocet [Oxycodone-Acetaminophen] Other (See Comments)     hallucinations     Pregabalin Itching     Other reaction(s): Fatigue     Shrimp Swelling     Spironolactone Other (See Comments)     Dehydrated       Terazosin      Ciprofloxacin Rash     Simvastatin Palpitations     Muscle weakness, leg cramping         Current Outpatient Medications   Medication     allopurinol (ZYLOPRIM) 100 MG tablet     amLODIPine (NORVASC) 10 MG tablet     aspirin 81 MG tablet     clopidogrel (PLAVIX) 75 MG tablet     cyanocolbalamin (VITAMIN B-12) 1000 MCG tablet     fluticasone-salmeterol (ADVAIR) 250-50 MCG/DOSE inhaler     furosemide (LASIX) 40 MG tablet     isosorbide mononitrate (ISMO/MONOKET) 20 MG tablet     metoprolol tartrate (LOPRESSOR) 100 MG tablet     Multiple Vitamins-Minerals (PRESERVISION AREDS 2+MULTI VIT PO)     omeprazole (PRILOSEC) 40 MG DR capsule     potassium chloride ER (K-TAB/KLOR-CON) 10 MEQ CR tablet     pravastatin (PRAVACHOL) 80 MG tablet     timolol maleate (TIMOPTIC) 0.5 % ophthalmic solution     VITAMIN D3 25 MCG (1000 UT) tablet     amitriptyline (ELAVIL) 25 MG tablet     budesonide (PULMICORT) 0.5 MG/2ML neb solution     valACYclovir (VALTREX) 1000 mg tablet     No current facility-administered medications for this visit. "       Vitals:  /76   Pulse 76   Wt 78.5 kg (173 lb)   BMI 28.26 kg/m      Exam:  General: awake and alert, appears to be in no acute distress  Neuro: normal speech  Skin:nromal, some bruising  Lungs: clear bilaterally  Extremities:+ edema      Results:  Last Comprehensive Metabolic Panel:  Sodium   Date Value Ref Range Status   08/08/2022 140 133 - 144 mmol/L Final   08/26/2020 141 133 - 144 mmol/L Final     Potassium   Date Value Ref Range Status   08/08/2022 4.5 3.4 - 5.3 mmol/L Final   08/26/2020 4.4 3.4 - 5.3 mmol/L Final     Chloride   Date Value Ref Range Status   08/08/2022 110 (H) 94 - 109 mmol/L Final   08/26/2020 111 (H) 94 - 109 mmol/L Final     Carbon Dioxide   Date Value Ref Range Status   08/26/2020 24 20 - 32 mmol/L Final     Carbon Dioxide (CO2)   Date Value Ref Range Status   08/08/2022 23 20 - 32 mmol/L Final     Anion Gap   Date Value Ref Range Status   08/08/2022 7 3 - 14 mmol/L Final   08/26/2020 8 3 - 14 mmol/L Final     Glucose   Date Value Ref Range Status   08/08/2022 99 70 - 99 mg/dL Final   08/26/2020 84 70 - 99 mg/dL Final     Urea Nitrogen   Date Value Ref Range Status   08/08/2022 46 (H) 7 - 30 mg/dL Final   08/26/2020 41 (H) 7 - 30 mg/dL Final     Creatinine   Date Value Ref Range Status   08/08/2022 2.21 (H) 0.52 - 1.04 mg/dL Final   08/26/2020 1.70 (H) 0.52 - 1.04 mg/dL Final     GFR Estimate   Date Value Ref Range Status   08/08/2022 21 (L) >60 mL/min/1.73m2 Final     Comment:     Effective December 21, 2021 eGFRcr in adults is calculated using the 2021 CKD-EPI creatinine equation which includes age and gender (Madhavi garcia al., NEJ, DOI: 10.1056/JCMXbt2805611)   08/26/2020 27 (L) >60 mL/min/[1.73_m2] Final     Comment:     Non  GFR Calc  Starting 12/18/2018, serum creatinine based estimated GFR (eGFR) will be   calculated using the Chronic Kidney Disease Epidemiology Collaboration   (CKD-EPI) equation.       Calcium   Date Value Ref Range Status   08/08/2022  9.2 8.5 - 10.1 mg/dL Final   08/26/2020 9.0 8.5 - 10.1 mg/dL Final       Last Basic Metabolic Panel:  Lab Results   Component Value Date     11/08/2017      Lab Results   Component Value Date    POTASSIUM 3.5 11/08/2017     Lab Results   Component Value Date    CHLORIDE 104 11/08/2017     Lab Results   Component Value Date    ALEXANDRO 8.8 11/08/2017     Lab Results   Component Value Date    CO2 26 11/08/2017     Lab Results   Component Value Date    BUN 54 11/08/2017     Lab Results   Component Value Date    CR 2.36 11/08/2017     Lab Results   Component Value Date    GLC 88 11/08/2017       Marzena Martin MD   of Medicine  Department of Nephrology  [facility]

## 2022-11-21 NOTE — PROGRESS NOTES
Assessment and Plan:   87 year old female with history of long standing HTN, CAD s/p multiple stents, who presents for followup of CKD stage 3, baseline SCr 1.8-2.2 mg/dL without proteinuria. She had another angiogram with stent done June 2022.    1. CKD stage 3- baseline SCr 1.8-2.2mg/ dL, eGFR 25-29 ml/min. Mild/ minimal proteinuria    - had angiogram in June 2022, no labs since, will do renal panel today  Her swelling previously occasional and she would ttake an extra furosemide but typically only takes furosemide 40mg once a day however now has more swelling since being on amlodipine 10mg for better BP control   - will have her incrase furosemide to BID for 5-7 days and reassess, may benefit from lowering amlodipine to 5mg  - monitor labs, relatively stable at this time,     2. Electrolytes/Acid Base status- normal.    Hypokalemia- takes 3 potassium pills daily   - if she has more cramps, should let us know and eat high K diet.  - check Magnesium    3. Hypertension/Volume status-  She is up a little and swelling is also worse. She is on metoprolol 100 mg BID, furosemide 40 mg once a day with an extra pill prn,.   - on amlodipine 10mg she has more swelling- monitor  -patient reports history of reaction/iontolerance to Spironolactone, Chlorthalidone, Clonidine and Terazosin in the past   - takes prn furosemide in the afternoon, and BP is close to goal.     4. Anemia- previously improved. Last Hgb was in normal range. Iron studies were not recently checked  - low platelet count- down to 60s- add on to hgb today, refer to hematology if still low- no clear reason    5. BMD  - last serum calcium and phosphorus were normal  -secondary hyperparathyroidism-PTH was 184. Patient had PTH of 144 in 11/17. We will zi    Assessment and plan was discussed with patient and she voiced her understanding and agreement.        Reason for Visit:  Tessa Mansfield is an 87 year old female with HTN, who presents for CKD management.      HPI:  She is a pleasant female with PMMHx significant for stage III CKD presumed secondary to hypertensive nephrosclerosis.Her renal function has been  relatively stable, ranging from 1.8-2.2 mg/dL over the years. She has history of CAD s/p multiple stents  (8 stents in all) since 2004 and a pacemaker in 2007. She follows with cardiologist Dr Santoyo and Dr Zhu.  Since her last visit she has been ok. She was having more dyspnea on exertion and had an angiogram and stent placed in June 2022. Her creatinine was fairly stable at thie time.    Her blood pressure had been running higher and amlodipine was started and increased to 10mg.  She has had more swelling in lower extremities with this. She is taking furosemide 40mg in AM, rarely in PM   She is moderating salt intake. Her blood pressure has improved with amlodipine but swelling is bothersome    She did take furosemide twice a day for a while , and now takes it prn, usually once or twice a week.  We discussed her low platelet count. She denies any bleeding, liver issues.  She feels well overall, good energy and no new medical issues in recent months.    Home BP: 130 range    Baseline Cr: 1.8-2.2    ROS:  A comprehensive review of systems was obtained and negative, except as noted in the HPI or PMH.    Active Medical Problems:  Patient Active Problem List   Diagnosis     Degeneration of cervical intervertebral disc     Nonallopathic lesion of cervical region     Nonallopathic lesion of thoracic region     Coronary artery disease of native artery of native heart with stable angina pectoris (H)     Dizziness and giddiness     Edema     Esophageal reflux     Gout     Essential hypertension     Obstructive sleep apnea     Osteomalacia     Post-menopausal osteoporosis     Cardiac Pacemaker- Medtronic, dual chamber- NOT dependant     Transient complete heart block (H)     Sinus node dysfunction (H)     Disturbance of skin sensation     NSTEMI (non-ST elevated  myocardial infarction) (H)     Arrhythmia     Sick sinus syndrome (H)     Non-rheumatic mitral regurgitation     Non-rheumatic tricuspid valve insufficiency     CKD (chronic kidney disease) stage 4, GFR 15-29 ml/min (H)     Status post coronary angiogram     Secondary renal hyperparathyroidism (H)     Thrombocytopenia (H)       Personal Hx:   Social History     Socioeconomic History     Marital status:      Spouse name: Not on file     Number of children: 4     Years of education: Not on file     Highest education level: Not on file   Occupational History     Employer: [employer]   Tobacco Use     Smoking status: Former     Packs/day: 0.30     Years: 15.00     Pack years: 4.50     Types: Cigarettes     Smokeless tobacco: Never     Tobacco comments:     Quit 22+ years ago   Substance and Sexual Activity     Alcohol use: Not on file     Drug use: No     Sexual activity: Not Currently     Partners: Male     Birth control/protection: Post-menopausal   Other Topics Concern      Service Not Asked     Blood Transfusions Yes     Caffeine Concern Not Asked     Occupational Exposure Not Asked     Hobby Hazards Not Asked     Sleep Concern Not Asked     Stress Concern Not Asked     Weight Concern Not Asked     Special Diet Not Asked     Back Care Not Asked     Exercise No     Bike Helmet Not Asked     Seat Belt Not Asked     Self-Exams Not Asked     Parent/sibling w/ CABG, MI or angioplasty before 65F 55M? Not Asked   Social History Narrative     Not on file     Social Determinants of Health     Financial Resource Strain: Not on file   Food Insecurity: Not on file   Transportation Needs: Not on file   Physical Activity: Not on file   Stress: Not on file   Social Connections: Not on file   Intimate Partner Violence: Not on file   Housing Stability: Not on file       Allergies:  Allergies   Allergen Reactions     Bactrim [Sulfamethoxazole W/Trimethoprim] Other (See Comments)     Patient unable to  "recall     Biaxin [Clarithromycin]      Chlorthalidone Nausea and Vomiting     Clonidine      Codeine Sulfate Itching     Darvon [Propoxyphene Hcl]      Dilaudid [Hydromorphone] Visual Disturbance     Hallucinations       Gabapentin Other (See Comments) and Fatigue     \"felt drunk\"     Indocin      Levaquin [Levofloxacin Hemihydrate]      Lyrica Fatigue     Morphine Sulfate      Percocet [Oxycodone-Acetaminophen] Other (See Comments)     hallucinations     Pregabalin Itching     Other reaction(s): Fatigue     Shrimp Swelling     Spironolactone Other (See Comments)     Dehydrated       Terazosin      Ciprofloxacin Rash     Simvastatin Palpitations     Muscle weakness, leg cramping         Current Outpatient Medications   Medication     allopurinol (ZYLOPRIM) 100 MG tablet     amLODIPine (NORVASC) 10 MG tablet     aspirin 81 MG tablet     clopidogrel (PLAVIX) 75 MG tablet     cyanocolbalamin (VITAMIN B-12) 1000 MCG tablet     fluticasone-salmeterol (ADVAIR) 250-50 MCG/DOSE inhaler     furosemide (LASIX) 40 MG tablet     isosorbide mononitrate (ISMO/MONOKET) 20 MG tablet     metoprolol tartrate (LOPRESSOR) 100 MG tablet     Multiple Vitamins-Minerals (PRESERVISION AREDS 2+MULTI VIT PO)     omeprazole (PRILOSEC) 40 MG DR capsule     potassium chloride ER (K-TAB/KLOR-CON) 10 MEQ CR tablet     pravastatin (PRAVACHOL) 80 MG tablet     timolol maleate (TIMOPTIC) 0.5 % ophthalmic solution     VITAMIN D3 25 MCG (1000 UT) tablet     amitriptyline (ELAVIL) 25 MG tablet     budesonide (PULMICORT) 0.5 MG/2ML neb solution     valACYclovir (VALTREX) 1000 mg tablet     No current facility-administered medications for this visit.       Vitals:  /76   Pulse 76   Wt 78.5 kg (173 lb)   BMI 28.26 kg/m      Exam:  General: awake and alert, appears to be in no acute distress  Neuro: normal speech  Skin:nromal, some bruising  Lungs: clear bilaterally  Extremities:+ edema      Results:  Last Comprehensive Metabolic Panel:  Sodium "   Date Value Ref Range Status   08/08/2022 140 133 - 144 mmol/L Final   08/26/2020 141 133 - 144 mmol/L Final     Potassium   Date Value Ref Range Status   08/08/2022 4.5 3.4 - 5.3 mmol/L Final   08/26/2020 4.4 3.4 - 5.3 mmol/L Final     Chloride   Date Value Ref Range Status   08/08/2022 110 (H) 94 - 109 mmol/L Final   08/26/2020 111 (H) 94 - 109 mmol/L Final     Carbon Dioxide   Date Value Ref Range Status   08/26/2020 24 20 - 32 mmol/L Final     Carbon Dioxide (CO2)   Date Value Ref Range Status   08/08/2022 23 20 - 32 mmol/L Final     Anion Gap   Date Value Ref Range Status   08/08/2022 7 3 - 14 mmol/L Final   08/26/2020 8 3 - 14 mmol/L Final     Glucose   Date Value Ref Range Status   08/08/2022 99 70 - 99 mg/dL Final   08/26/2020 84 70 - 99 mg/dL Final     Urea Nitrogen   Date Value Ref Range Status   08/08/2022 46 (H) 7 - 30 mg/dL Final   08/26/2020 41 (H) 7 - 30 mg/dL Final     Creatinine   Date Value Ref Range Status   08/08/2022 2.21 (H) 0.52 - 1.04 mg/dL Final   08/26/2020 1.70 (H) 0.52 - 1.04 mg/dL Final     GFR Estimate   Date Value Ref Range Status   08/08/2022 21 (L) >60 mL/min/1.73m2 Final     Comment:     Effective December 21, 2021 eGFRcr in adults is calculated using the 2021 CKD-EPI creatinine equation which includes age and gender (Madhavi et al., NEJM, DOI: 10.1056/XQUIqs0560683)   08/26/2020 27 (L) >60 mL/min/[1.73_m2] Final     Comment:     Non  GFR Calc  Starting 12/18/2018, serum creatinine based estimated GFR (eGFR) will be   calculated using the Chronic Kidney Disease Epidemiology Collaboration   (CKD-EPI) equation.       Calcium   Date Value Ref Range Status   08/08/2022 9.2 8.5 - 10.1 mg/dL Final   08/26/2020 9.0 8.5 - 10.1 mg/dL Final       Last Basic Metabolic Panel:  Lab Results   Component Value Date     11/08/2017      Lab Results   Component Value Date    POTASSIUM 3.5 11/08/2017     Lab Results   Component Value Date    CHLORIDE 104 11/08/2017     Lab Results    Component Value Date    ALEXANDRO 8.8 11/08/2017     Lab Results   Component Value Date    CO2 26 11/08/2017     Lab Results   Component Value Date    BUN 54 11/08/2017     Lab Results   Component Value Date    CR 2.36 11/08/2017     Lab Results   Component Value Date    GLC 88 11/08/2017       Marzena Martin MD   of Medicine  Department of Nephrology  [facility]

## 2022-11-22 LAB
ALBUMIN MFR UR ELPH: 23.3 MG/DL
ALBUMIN SERPL-MCNC: 3.8 G/DL (ref 3.4–5)
ANION GAP SERPL CALCULATED.3IONS-SCNC: 6 MMOL/L (ref 3–14)
BUN SERPL-MCNC: 35 MG/DL (ref 7–30)
CALCIUM SERPL-MCNC: 8.6 MG/DL (ref 8.5–10.1)
CHLORIDE BLD-SCNC: 110 MMOL/L (ref 94–109)
CO2 SERPL-SCNC: 24 MMOL/L (ref 20–32)
CREAT SERPL-MCNC: 1.9 MG/DL (ref 0.52–1.04)
CREAT UR-MCNC: 14 MG/DL
ERYTHROCYTE [DISTWIDTH] IN BLOOD BY AUTOMATED COUNT: 14.8 % (ref 10–15)
GFR SERPL CREATININE-BSD FRML MDRD: 25 ML/MIN/1.73M2
GLUCOSE BLD-MCNC: 79 MG/DL (ref 70–99)
HCT VFR BLD AUTO: 36.7 % (ref 35–47)
HGB BLD-MCNC: 11 G/DL (ref 11.7–15.7)
IRON SATN MFR SERPL: 15 % (ref 15–46)
IRON SERPL-MCNC: 56 UG/DL (ref 35–180)
MCH RBC QN AUTO: 29.3 PG (ref 26.5–33)
MCHC RBC AUTO-ENTMCNC: 30.6 G/DL (ref 31.5–36.5)
MCV RBC AUTO: 98 FL (ref 78–100)
PHOSPHATE SERPL-MCNC: 3.5 MG/DL (ref 2.5–4.5)
PLATELET # BLD AUTO: 125 10E3/UL (ref 150–450)
POTASSIUM BLD-SCNC: 4.8 MMOL/L (ref 3.4–5.3)
PROT/CREAT 24H UR: 1.66 MG/MG CR (ref 0–0.2)
RBC # BLD AUTO: 3.75 10E6/UL (ref 3.8–5.2)
SODIUM SERPL-SCNC: 140 MMOL/L (ref 133–144)
TIBC SERPL-MCNC: 378 UG/DL (ref 240–430)
WBC # BLD AUTO: 7.2 10E3/UL (ref 4–11)

## 2022-11-23 ENCOUNTER — ANCILLARY PROCEDURE (OUTPATIENT)
Dept: CARDIOLOGY | Facility: CLINIC | Age: 87
End: 2022-11-23
Attending: INTERNAL MEDICINE
Payer: COMMERCIAL

## 2022-11-23 DIAGNOSIS — I49.5 SICK SINUS SYNDROME (H): ICD-10-CM

## 2022-11-23 PROCEDURE — 93294 REM INTERROG EVL PM/LDLS PM: CPT | Performed by: INTERNAL MEDICINE

## 2022-11-23 PROCEDURE — 93296 REM INTERROG EVL PM/IDS: CPT

## 2022-11-27 LAB
MDC_IDC_LEAD_IMPLANT_DT: NORMAL
MDC_IDC_LEAD_IMPLANT_DT: NORMAL
MDC_IDC_LEAD_LOCATION: NORMAL
MDC_IDC_LEAD_LOCATION: NORMAL
MDC_IDC_LEAD_LOCATION_DETAIL_1: NORMAL
MDC_IDC_LEAD_LOCATION_DETAIL_1: NORMAL
MDC_IDC_LEAD_MFG: NORMAL
MDC_IDC_LEAD_MFG: NORMAL
MDC_IDC_LEAD_MODEL: NORMAL
MDC_IDC_LEAD_MODEL: NORMAL
MDC_IDC_LEAD_POLARITY_TYPE: NORMAL
MDC_IDC_LEAD_POLARITY_TYPE: NORMAL
MDC_IDC_LEAD_SERIAL: NORMAL
MDC_IDC_LEAD_SERIAL: NORMAL
MDC_IDC_LEAD_SPECIAL_FUNCTION: NORMAL
MDC_IDC_LEAD_SPECIAL_FUNCTION: NORMAL
MDC_IDC_MSMT_BATTERY_DTM: NORMAL
MDC_IDC_MSMT_BATTERY_REMAINING_LONGEVITY: 125 MO
MDC_IDC_MSMT_BATTERY_RRT_TRIGGER: 2.62
MDC_IDC_MSMT_BATTERY_STATUS: NORMAL
MDC_IDC_MSMT_BATTERY_VOLTAGE: 3.01 V
MDC_IDC_MSMT_LEADCHNL_RA_IMPEDANCE_VALUE: 304 OHM
MDC_IDC_MSMT_LEADCHNL_RA_IMPEDANCE_VALUE: 342 OHM
MDC_IDC_MSMT_LEADCHNL_RA_PACING_THRESHOLD_AMPLITUDE: 0.5 V
MDC_IDC_MSMT_LEADCHNL_RA_PACING_THRESHOLD_PULSEWIDTH: 0.4 MS
MDC_IDC_MSMT_LEADCHNL_RA_SENSING_INTR_AMPL: 2.75 MV
MDC_IDC_MSMT_LEADCHNL_RV_IMPEDANCE_VALUE: 399 OHM
MDC_IDC_MSMT_LEADCHNL_RV_IMPEDANCE_VALUE: 437 OHM
MDC_IDC_MSMT_LEADCHNL_RV_PACING_THRESHOLD_AMPLITUDE: 0.88 V
MDC_IDC_MSMT_LEADCHNL_RV_PACING_THRESHOLD_PULSEWIDTH: 0.4 MS
MDC_IDC_MSMT_LEADCHNL_RV_SENSING_INTR_AMPL: 8.38 MV
MDC_IDC_PG_IMPLANT_DTM: NORMAL
MDC_IDC_PG_MFG: NORMAL
MDC_IDC_PG_MODEL: NORMAL
MDC_IDC_PG_SERIAL: NORMAL
MDC_IDC_PG_TYPE: NORMAL
MDC_IDC_SESS_CLINIC_NAME: NORMAL
MDC_IDC_SESS_DTM: NORMAL
MDC_IDC_SESS_TYPE: NORMAL
MDC_IDC_SET_BRADY_AT_MODE_SWITCH_RATE: 171 {BEATS}/MIN
MDC_IDC_SET_BRADY_HYSTRATE: NORMAL
MDC_IDC_SET_BRADY_LOWRATE: 60 {BEATS}/MIN
MDC_IDC_SET_BRADY_MAX_SENSOR_RATE: 130 {BEATS}/MIN
MDC_IDC_SET_BRADY_MAX_TRACKING_RATE: 130 {BEATS}/MIN
MDC_IDC_SET_BRADY_MODE: NORMAL
MDC_IDC_SET_BRADY_PAV_DELAY_LOW: 180 MS
MDC_IDC_SET_BRADY_SAV_DELAY_LOW: 150 MS
MDC_IDC_SET_LEADCHNL_RA_PACING_AMPLITUDE: 1.5 V
MDC_IDC_SET_LEADCHNL_RA_PACING_ANODE_ELECTRODE_1: NORMAL
MDC_IDC_SET_LEADCHNL_RA_PACING_ANODE_LOCATION_1: NORMAL
MDC_IDC_SET_LEADCHNL_RA_PACING_CAPTURE_MODE: NORMAL
MDC_IDC_SET_LEADCHNL_RA_PACING_CATHODE_ELECTRODE_1: NORMAL
MDC_IDC_SET_LEADCHNL_RA_PACING_CATHODE_LOCATION_1: NORMAL
MDC_IDC_SET_LEADCHNL_RA_PACING_POLARITY: NORMAL
MDC_IDC_SET_LEADCHNL_RA_PACING_PULSEWIDTH: 0.4 MS
MDC_IDC_SET_LEADCHNL_RA_SENSING_ANODE_ELECTRODE_1: NORMAL
MDC_IDC_SET_LEADCHNL_RA_SENSING_ANODE_LOCATION_1: NORMAL
MDC_IDC_SET_LEADCHNL_RA_SENSING_CATHODE_ELECTRODE_1: NORMAL
MDC_IDC_SET_LEADCHNL_RA_SENSING_CATHODE_LOCATION_1: NORMAL
MDC_IDC_SET_LEADCHNL_RA_SENSING_POLARITY: NORMAL
MDC_IDC_SET_LEADCHNL_RA_SENSING_SENSITIVITY: 0.3 MV
MDC_IDC_SET_LEADCHNL_RV_PACING_AMPLITUDE: 2 V
MDC_IDC_SET_LEADCHNL_RV_PACING_ANODE_ELECTRODE_1: NORMAL
MDC_IDC_SET_LEADCHNL_RV_PACING_ANODE_LOCATION_1: NORMAL
MDC_IDC_SET_LEADCHNL_RV_PACING_CAPTURE_MODE: NORMAL
MDC_IDC_SET_LEADCHNL_RV_PACING_CATHODE_ELECTRODE_1: NORMAL
MDC_IDC_SET_LEADCHNL_RV_PACING_CATHODE_LOCATION_1: NORMAL
MDC_IDC_SET_LEADCHNL_RV_PACING_POLARITY: NORMAL
MDC_IDC_SET_LEADCHNL_RV_PACING_PULSEWIDTH: 0.4 MS
MDC_IDC_SET_LEADCHNL_RV_SENSING_ANODE_ELECTRODE_1: NORMAL
MDC_IDC_SET_LEADCHNL_RV_SENSING_ANODE_LOCATION_1: NORMAL
MDC_IDC_SET_LEADCHNL_RV_SENSING_CATHODE_ELECTRODE_1: NORMAL
MDC_IDC_SET_LEADCHNL_RV_SENSING_CATHODE_LOCATION_1: NORMAL
MDC_IDC_SET_LEADCHNL_RV_SENSING_POLARITY: NORMAL
MDC_IDC_SET_LEADCHNL_RV_SENSING_SENSITIVITY: 0.9 MV
MDC_IDC_SET_ZONE_DETECTION_INTERVAL: 350 MS
MDC_IDC_SET_ZONE_DETECTION_INTERVAL: 400 MS
MDC_IDC_SET_ZONE_TYPE: NORMAL
MDC_IDC_STAT_AT_BURDEN_PERCENT: 0 %
MDC_IDC_STAT_AT_DTM_END: NORMAL
MDC_IDC_STAT_AT_DTM_START: NORMAL
MDC_IDC_STAT_BRADY_AP_VP_PERCENT: 0.09 %
MDC_IDC_STAT_BRADY_AP_VS_PERCENT: 67.75 %
MDC_IDC_STAT_BRADY_AS_VP_PERCENT: 0.03 %
MDC_IDC_STAT_BRADY_AS_VS_PERCENT: 32.13 %
MDC_IDC_STAT_BRADY_DTM_END: NORMAL
MDC_IDC_STAT_BRADY_DTM_START: NORMAL
MDC_IDC_STAT_BRADY_RA_PERCENT_PACED: 71.09 %
MDC_IDC_STAT_BRADY_RV_PERCENT_PACED: 0.12 %
MDC_IDC_STAT_EPISODE_RECENT_COUNT: 0
MDC_IDC_STAT_EPISODE_RECENT_COUNT_DTM_END: NORMAL
MDC_IDC_STAT_EPISODE_RECENT_COUNT_DTM_START: NORMAL
MDC_IDC_STAT_EPISODE_TOTAL_COUNT: 0
MDC_IDC_STAT_EPISODE_TOTAL_COUNT: 1
MDC_IDC_STAT_EPISODE_TOTAL_COUNT_DTM_END: NORMAL
MDC_IDC_STAT_EPISODE_TOTAL_COUNT_DTM_START: NORMAL
MDC_IDC_STAT_EPISODE_TYPE: NORMAL

## 2022-12-06 ENCOUNTER — OFFICE VISIT (OUTPATIENT)
Dept: CARDIOLOGY | Facility: CLINIC | Age: 87
End: 2022-12-06
Attending: INTERNAL MEDICINE
Payer: COMMERCIAL

## 2022-12-06 VITALS
BODY MASS INDEX: 26.86 KG/M2 | OXYGEN SATURATION: 99 % | WEIGHT: 167.1 LBS | HEIGHT: 66 IN | HEART RATE: 44 BPM | SYSTOLIC BLOOD PRESSURE: 137 MMHG | DIASTOLIC BLOOD PRESSURE: 72 MMHG

## 2022-12-06 DIAGNOSIS — I10 ESSENTIAL HYPERTENSION: ICD-10-CM

## 2022-12-06 DIAGNOSIS — I25.118 CORONARY ARTERY DISEASE OF NATIVE ARTERY OF NATIVE HEART WITH STABLE ANGINA PECTORIS (H): ICD-10-CM

## 2022-12-06 PROCEDURE — 99214 OFFICE O/P EST MOD 30 MIN: CPT | Performed by: INTERNAL MEDICINE

## 2022-12-06 PROCEDURE — G0463 HOSPITAL OUTPT CLINIC VISIT: HCPCS

## 2022-12-06 ASSESSMENT — PAIN SCALES - GENERAL: PAINLEVEL: NO PAIN (0)

## 2022-12-06 NOTE — PROGRESS NOTES
Chief complaint: CAD     HPI:   Tessa Mansfield is a 86 year old female former patient of Dr. Mir with extensive CAD history as detailed below (PCI to all 3 vessels at various times from 5340-6259, last PCA to RPDA 7/23/13 and known 80% ISR of small LCX) on indefinite dual antiplatelet therapy and sick sinus syndrome s/p dual-chamber PPM who presents for follow up of her chronic cardiovascular conditions.      12/10/19:  She underwent pacemaker generator change 10/15/19 due to her prior generator reaching LOLA (new device: Medtronic [id number] [place] XT DR MRI dual chamber PPM) without event.      TTE 10/15/19 (my read):   Normal LV/RV function, LVEF=60-65%.   At least moderate MR and moderate-to-severe TR.  RVSP~39+RAP~15 mmHg.  Compared to 7/24/13: MR and TR have worsened.      She reports volume status has been about stable and that she has self-titrated furosemide as needed and has done so for some time.      5/11/21:  Since her last visit, Tessa reports feeling generally well. She does have occasional episodes of angina (pain between her shoulders) for which she takes 1 NTG every 2-3 months, which has been longstanding and stable.      She denies any dyspnea at rest or with exertion, PND, orthopnea, peripheral edema, palpitations, lightheadedness or syncope.      5/17/2022:  Patient states that she has had SOB for the last month. States that she is able to walk 1-2 blocks before developing SOB, prior to this she was able to walk about 4 blocks before becoming SOB.  She denies chest pain, chest pressure, lightheadedness or dizziness.     12/06/22:  Since her last visit, she underwent coronary angiogram and PCI+ALVAREZ to RCA with dramatic improvement in her exertional dyspnea. She completed cardiac rehab. She does think she could walk somewhat further than before but has not pushed her exercise capacity recently. She plans to start this in in the new year. She has not required NTG at all since her last visit.     She  denies any dyspnea at rest or with exertion, PND, orthopnea, peripheral edema, palpitations, lightheadedness or syncope.     Summary of CAD history:  1. 10/27/2002: AMI s/p PCI+BMS (3.5x18mm Bx velocity) to mRCA  2. 2/5/2003: Back pain; PCI+stent to mLCX c/b distal embolization/slow flow  3. 4/11/2003: Unstable angina; PCI+stent (Hepacoat velocity) to mLAD  4. 11/27/2007: PCI+DESx2 to pLAD (IVUS w/ calcification of LMCA and ulcerated plaque pLAD, 80% dRCA; also had 80% dLCX, too small to stent)  5. 12/11/2007: Staged PCI. PCI+DESx1 (3.5x13mm Cypher) to dRCA (indefinite DAPT recommended at this time)  6. 6/27/2008: Angina. mLCX stent 70% ISR. LAD and RCA stents patent. Myocardium at risk from LCX felt to be small, medical management preferred to PCI.  7. 11/10/2009: Angina. Findings unchanged from 6/27/2008, FFR LAD 0.90. Medical management recommended.  8. 7/23/2013: Unstable angina; PCI+ALVAREZ to mPDA. Diagnostic findings: LMCA 40% distal. LAD: pLAD stents patent mLAD 30% ISR dLAD 50% diffuse, D2 diffuse disease. LCX 80% mid ISR. RCA diffuse <30% mid, RPLAs diffuse disease, RPDA 100% occlusion.   9. 5/27/2022: 90% dRCA in-stent -> PCI+DESx1 to dRCA.  50-60% pRCA (return for hemodynamic assessment if symptoms persist/recur) 50-60% small LCx (medical management recommended)            PAST MEDICAL HISTORY:  Past Medical History:   Diagnosis Date     CAD (coronary artery disease)      CAD s/p PCI+BMS to MRCA 10/2002, PCI to mLCX 2/2003, PCI+DESx2 to pLAD 11/2007, PCI+DESx1 to dRCA 12/2007, PCI+ALVAREZ to RPDA 7/2013; on indefinite DAPT  10/27/2002    10/27/2002: AMI s/p PCI+BMS (3.5x18mm Bx velocity) to mRCA 2/5/2003: Back pain; PCI+stent to mLCX c/b distal embolization/slow flow 4/11/2003: Unstable angina; PCI+stent (Hepacoat velocity) to mLAD 11/27/2007: PCI+DESx2 to pLAD (IVUS w/ calcification of LMCA and ulcerated plaque pLAD, 80% dRCA; also had 80% dLCX, too small to stent) 12/11/2007: Staged PCI. PCI+DESx1 (3.5x13mm  "Cypher) to dRCA (indefinite DAPT recommended at this time) 6/27/2008: Angina. mLCX stent 70% ISR. LAD and RCA stents patent. Myocardium at risk from LCX felt to be small, medical management preferred to PCI. 11/10/2009: Angina. Findings unchanged from 6/27/2008, FFR LAD 0.90. Medical management recommended. 7/23/2013: Unstable angina; PCI+ALVAREZ to mPDA. Diagnostic findings: LMCA 40% distal. LAD: pLAD stents patent mLAD 30% ISR dLAD 50% diffuse, D2 diffuse disease. LCX 80% mid ISR. RCA diffuse <30% mid, RPLAs diffuse disease, RPDA 100% occlusion.       Cardiac Pacemaker- Medtronic, dual chamber- NOT dependant 11/28/2007     CKD (chronic kidney disease), stage IV (H)      Hyperlipidemia LDL goal <70      Hypertension      Stented coronary artery 10-    RCA     Stented coronary artery 2-5-2003    LCx     Stented coronary artery 4-    LAD     Stented coronary artery 11-    LAD     Stented coronary artery 12-    RCA     Transient complete heart block (H) 11/28/2007       CURRENT MEDICATIONS:  Reviewed.     ALLERGIES:     Allergies   Allergen Reactions     Bactrim [Sulfamethoxazole W/Trimethoprim] Other (See Comments)     Patient unable to recall     Biaxin [Clarithromycin]      Chlorthalidone Nausea and Vomiting     Clonidine      Codeine Sulfate Itching     Darvon [Propoxyphene Hcl]      Dilaudid [Hydromorphone] Visual Disturbance     Hallucinations       Gabapentin Other (See Comments) and Fatigue     \"felt drunk\"     Indocin      Levaquin [Levofloxacin Hemihydrate]      Lyrica Fatigue     Morphine Sulfate      Percocet [Oxycodone-Acetaminophen] Other (See Comments)     hallucinations     Pregabalin Itching     Other reaction(s): Fatigue     Shrimp Swelling     Spironolactone Other (See Comments)     Dehydrated       Terazosin      Ciprofloxacin Rash     Simvastatin Palpitations     Muscle weakness, leg cramping         FAMILY HISTORY:  Family History   Problem Relation Age of Onset     Cancer " "Sister 82        bladder cancer       SOCIAL HISTORY:  Social History     Tobacco Use     Smoking status: Former     Packs/day: 0.30     Years: 15.00     Pack years: 4.50     Types: Cigarettes     Smokeless tobacco: Never     Tobacco comments:     Quit 22+ years ago   Substance Use Topics     Drug use: No       ROS:   A comprehensive 14 point review of systems is negative other than as mentioned in HPI.    Exam:  /72 (BP Location: Left arm, Patient Position: Sitting, Cuff Size: Adult Regular)   Pulse (!) 44   Ht 1.689 m (5' 6.5\")   Wt 75.8 kg (167 lb 1.6 oz)   SpO2 99%   BMI 26.57 kg/m    GENERAL APPEARANCE: healthy, alert and no distress  EYES: no icterus, no xanthelasmas  ENT: normal palate, mucosa moist, no central cyanosis  NECK: JVP pre-V 7 (large CV wave)   RESPIRATORY: lungs clear to auscultation - no rales, rhonchi or wheezes, no use of accessory muscles, no retractions, respirations are unlabored, normal respiratory rate  CARDIOVASCULAR: regular rhythm, II/VI systolic murmur LLSB and apex  GI: soft, non tender, bowel sounds normal,no abdominal bruits  EXTREMITIES: no edema, no bruits  NEURO: alert and oriented to person/place/time, normal speech, gait and affect  VASC: Radial, dorsalis pedis and posterior tibialis pulses 2+ bilaterally.  SKIN: no ecchymoses, no rashes.  PSYCH: cooperative, affect appropriate.     Labs:  Reviewed.       Testing/Procedures:  TTE 10/15/19:   Normal LV/RV function, LVEF=60-65%.   At least moderate MR and moderate-to-severe TR.  RVSP~39+RAP~15 mmHg.  Compared to 7/24/13: MR and TR have worsened.      Device check 05/11/21: Normal device function. 86.3% AP <1% . Intrinsic rhythm sinus bradycardia at 48 bpm. No atrial or ventricular arrhythmias. Lead trends stable.    Device check 3/25/22: Normal device function. AP: 78% : <0.1% Presenting EGM: AP-VS @ 83 bpm. 2 seconds of AT/AF. <0.1% AF burden. No ventricular arrhythmias.     TTE 05/17/22: LVEF:60-65%, normal RV " function, moderate MR, moderate TR, normal IVC       Dr. Santoyo personally visualized:  Coronary angiogram/RHC/PCI 5/27/22:  90% dRCA in-stent -> PCI+ALVAREZ   50-60% pRCA (return for hemodynamic assessment if symptoms persist/recur)  50-60% small LCx (medical management recommended    RA 13 RV 46/13 PA 46/16 (26) PCW 15  Jamila CO 4.76 CI 2.6  PVR by Jamila CO 2.3    Device interrogation 11/29/22: 71% AP. No AF. Normal device function.       Assessment and Plan:   1. CAD s/p multiple PCIs as above (last to RPDA 7/23/13 and now s/p PCI+DESx1 to dRCA 5/27/22) and known 50-60% pRCA ISR, now with significant improvement in stable angina:   Dyspnea dramatically improved post-PCI-- I believe this was an anginal equivalent (near-normal PCW on RHC at the time of coronary angiogram/PCI supports this.)   If she has recurrent symptoms, repeat coronary angiogram and FFR/iFR of 50-60% pRCA lesion would be worth pursuing  -continue DAPT indefinitely  -continue statin  -If she has recurrent symptoms, repeat coronary angiogram and FFR/iFR of 50-60% pRCA lesion would be worth pursuing       2. Moderate mitral regurgitation, stable  3. Moderate-to-severe tricuspid regurgitation, stable/clinically improved  -no current symptoms, will continue to follow clinically and with periodic TTE       4. Renal Hypertension, controlled  5. CKD stage 4  -continue present therapy  -followed by Odilia Martin and Scar        6. Sick sinus syndrome s/p dual chamber PPM:  -routine device clinic follow up    Tyrell Santoyo MD  Cardiology

## 2022-12-06 NOTE — PATIENT INSTRUCTIONS
"Cardiology Providers you saw during your visit:  Dr. Santoyo    Medication changes:  None    Follow up:  Follow up with [facility] in 6 months  Follow up with Dr. Santoyo in 1 year.    Labs:  none    Please call if you have :  1. Weight gain of more than 2 pounds in a day or 5 pounds in a week  2. Increased shortness of breath, swelling or bloating  3. Dizziness, lightheadedness   4. Any questions or concerns.     Follow the American Heart Association Diet and Lifestyle recommendations:  Limit saturated fat, trans fat, sodium, red meat, sweets and sugar-sweetened beverages. If you choose to eat red meat, compare labels and select the leanest cuts available.  Aim for at least 150 minutes of moderate physical activity or 75 minutes of vigorous physical activity - or an equal combination of both - each week.    During business hours: [phone], option # 1 \"To leave a message for your care team\"     After hours, weekends or holidays: On Call Cardiologist [phone]   option #4 and ask to speak to the on-call Cardiologist. Inform them you are a CORE/heart failure patient at the [facility].    Rivka Chou, RN BSN  Cardiology Nurse Coordinator - Heart Failure Clinic  [facility]  [phone] option 1 to schedule an appointment or leave a message for your care team    "

## 2022-12-06 NOTE — NURSING NOTE
Chief Complaint   Patient presents with     Follow Up      Return cardiology, 86 yo female with CAD, essential hypertension, NSTEMI presents for annual follow up.     Vitals were taken and medications reconciled.    Nikolai Stevens, EMT  2:58 PM

## 2022-12-06 NOTE — NURSING NOTE
Med Reconcile: Reviewed and verified all current medications with the patient. The updated medication list was printed and given to the patient.    Return Appointment: Patient given instructions regarding scheduling next clinic visit. Patient demonstrated understanding of this information and agreed to call with further questions or concerns. [facility] in 6 months, Dr. Santoyo in 1 year.    Patient stated she understood all health information given and agreed to call with further questions or concerns.    Rivka Chou RN

## 2022-12-06 NOTE — LETTER
12/6/2022      RE: Tessa Mansfield  [address]  [facility] [address]       Dear Colleague,    Thank you for the opportunity to participate in the care of your patient, Tessa Mansfield, at the [facility] [place] [facility]. Please see a copy of my visit note below.    Chief complaint: CAD     HPI:   Tessa Mansfield is a 86 year old female former patient of Dr. Mir with extensive CAD history as detailed below (PCI to all 3 vessels at various times from 0398-3599, last PCA to RPDA 7/23/13 and known 80% ISR of small LCX) on indefinite dual antiplatelet therapy and sick sinus syndrome s/p dual-chamber PPM who presents for follow up of her chronic cardiovascular conditions.      12/10/19:  She underwent pacemaker generator change 10/15/19 due to her prior generator reaching LOLA (new device: Medtronic [id number] [place] XT  MRI dual chamber PPM) without event.      TTE 10/15/19 (my read):   Normal LV/RV function, LVEF=60-65%.   At least moderate MR and moderate-to-severe TR.  RVSP~39+RAP~15 mmHg.  Compared to 7/24/13: MR and TR have worsened.      She reports volume status has been about stable and that she has self-titrated furosemide as needed and has done so for some time.      5/11/21:  Since her last visit, Tessa reports feeling generally well. She does have occasional episodes of angina (pain between her shoulders) for which she takes 1 NTG every 2-3 months, which has been longstanding and stable.      She denies any dyspnea at rest or with exertion, PND, orthopnea, peripheral edema, palpitations, lightheadedness or syncope.      5/17/2022:  Patient states that she has had SOB for the last month. States that she is able to walk 1-2 blocks before developing SOB, prior to this she was able to walk about 4 blocks before becoming SOB.  She denies chest pain, chest pressure, lightheadedness or dizziness.     12/06/22:  Since her last visit, she  underwent coronary angiogram and PCI+ALVAREZ to RCA with dramatic improvement in her exertional dyspnea. She completed cardiac rehab. She does think she could walk somewhat further than before but has not pushed her exercise capacity recently. She plans to start this in in the new year. She has not required NTG at all since her last visit.     She denies any dyspnea at rest or with exertion, PND, orthopnea, peripheral edema, palpitations, lightheadedness or syncope.     Summary of CAD history:  1. 10/27/2002: AMI s/p PCI+BMS (3.5x18mm Bx velocity) to mRCA  2. 2/5/2003: Back pain; PCI+stent to mLCX c/b distal embolization/slow flow  3. 4/11/2003: Unstable angina; PCI+stent (Hepacoat velocity) to mLAD  4. 11/27/2007: PCI+DESx2 to pLAD (IVUS w/ calcification of LMCA and ulcerated plaque pLAD, 80% dRCA; also had 80% dLCX, too small to stent)  5. 12/11/2007: Staged PCI. PCI+DESx1 (3.5x13mm Cypher) to dRCA (indefinite DAPT recommended at this time)  6. 6/27/2008: Angina. mLCX stent 70% ISR. LAD and RCA stents patent. Myocardium at risk from LCX felt to be small, medical management preferred to PCI.  7. 11/10/2009: Angina. Findings unchanged from 6/27/2008, FFR LAD 0.90. Medical management recommended.  8. 7/23/2013: Unstable angina; PCI+ALVAREZ to mPDA. Diagnostic findings: LMCA 40% distal. LAD: pLAD stents patent mLAD 30% ISR dLAD 50% diffuse, D2 diffuse disease. LCX 80% mid ISR. RCA diffuse <30% mid, RPLAs diffuse disease, RPDA 100% occlusion.   9. 5/27/2022: 90% dRCA in-stent -> PCI+DESx1 to dRCA.  50-60% pRCA (return for hemodynamic assessment if symptoms persist/recur) 50-60% small LCx (medical management recommended)            PAST MEDICAL HISTORY:  Past Medical History:   Diagnosis Date     CAD (coronary artery disease)      CAD s/p PCI+BMS to MRCA 10/2002, PCI to mLCX 2/2003, PCI+DESx2 to pLAD 11/2007, PCI+DESx1 to dRCA 12/2007, PCI+ALVAREZ to RPDA 7/2013; on indefinite DAPT  10/27/2002    10/27/2002: AMI s/p PCI+BMS  "(3.5x18mm Bx velocity) to mRCA 2/5/2003: Back pain; PCI+stent to mLCX c/b distal embolization/slow flow 4/11/2003: Unstable angina; PCI+stent (Hepacoat velocity) to mLAD 11/27/2007: PCI+DESx2 to pLAD (IVUS w/ calcification of LMCA and ulcerated plaque pLAD, 80% dRCA; also had 80% dLCX, too small to stent) 12/11/2007: Staged PCI. PCI+DESx1 (3.5x13mm Cypher) to dRCA (indefinite DAPT recommended at this time) 6/27/2008: Angina. mLCX stent 70% ISR. LAD and RCA stents patent. Myocardium at risk from LCX felt to be small, medical management preferred to PCI. 11/10/2009: Angina. Findings unchanged from 6/27/2008, FFR LAD 0.90. Medical management recommended. 7/23/2013: Unstable angina; PCI+ALVAREZ to mPDA. Diagnostic findings: LMCA 40% distal. LAD: pLAD stents patent mLAD 30% ISR dLAD 50% diffuse, D2 diffuse disease. LCX 80% mid ISR. RCA diffuse <30% mid, RPLAs diffuse disease, RPDA 100% occlusion.       Cardiac Pacemaker- Medtronic, dual chamber- NOT dependant 11/28/2007     CKD (chronic kidney disease), stage IV (H)      Hyperlipidemia LDL goal <70      Hypertension      Stented coronary artery 10-    RCA     Stented coronary artery 2-5-2003    LCx     Stented coronary artery 4-    LAD     Stented coronary artery 11-    LAD     Stented coronary artery 12-    RCA     Transient complete heart block (H) 11/28/2007       CURRENT MEDICATIONS:  Reviewed.     ALLERGIES:     Allergies   Allergen Reactions     Bactrim [Sulfamethoxazole W/Trimethoprim] Other (See Comments)     Patient unable to recall     Biaxin [Clarithromycin]      Chlorthalidone Nausea and Vomiting     Clonidine      Codeine Sulfate Itching     Darvon [Propoxyphene Hcl]      Dilaudid [Hydromorphone] Visual Disturbance     Hallucinations       Gabapentin Other (See Comments) and Fatigue     \"felt drunk\"     Indocin      Levaquin [Levofloxacin Hemihydrate]      Lyrica Fatigue     Morphine Sulfate      Percocet [Oxycodone-Acetaminophen] " "Other (See Comments)     hallucinations     Pregabalin Itching     Other reaction(s): Fatigue     Shrimp Swelling     Spironolactone Other (See Comments)     Dehydrated       Terazosin      Ciprofloxacin Rash     Simvastatin Palpitations     Muscle weakness, leg cramping         FAMILY HISTORY:  Family History   Problem Relation Age of Onset     Cancer Sister 82        bladder cancer       SOCIAL HISTORY:  Social History     Tobacco Use     Smoking status: Former     Packs/day: 0.30     Years: 15.00     Pack years: 4.50     Types: Cigarettes     Smokeless tobacco: Never     Tobacco comments:     Quit 22+ years ago   Substance Use Topics     Drug use: No       ROS:   A comprehensive 14 point review of systems is negative other than as mentioned in HPI.    Exam:  /72 (BP Location: Left arm, Patient Position: Sitting, Cuff Size: Adult Regular)   Pulse (!) 44   Ht 1.689 m (5' 6.5\")   Wt 75.8 kg (167 lb 1.6 oz)   SpO2 99%   BMI 26.57 kg/m    GENERAL APPEARANCE: healthy, alert and no distress  EYES: no icterus, no xanthelasmas  ENT: normal palate, mucosa moist, no central cyanosis  NECK: JVP pre-V 7 (large CV wave)   RESPIRATORY: lungs clear to auscultation - no rales, rhonchi or wheezes, no use of accessory muscles, no retractions, respirations are unlabored, normal respiratory rate  CARDIOVASCULAR: regular rhythm, II/VI systolic murmur LLSB and apex  GI: soft, non tender, bowel sounds normal,no abdominal bruits  EXTREMITIES: no edema, no bruits  NEURO: alert and oriented to person/place/time, normal speech, gait and affect  VASC: Radial, dorsalis pedis and posterior tibialis pulses 2+ bilaterally.  SKIN: no ecchymoses, no rashes.  PSYCH: cooperative, affect appropriate.     Labs:  Reviewed.       Testing/Procedures:  TTE 10/15/19:   Normal LV/RV function, LVEF=60-65%.   At least moderate MR and moderate-to-severe TR.  RVSP~39+RAP~15 mmHg.  Compared to 7/24/13: MR and TR have worsened.      Device check " 05/11/21: Normal device function. 86.3% AP <1% . Intrinsic rhythm sinus bradycardia at 48 bpm. No atrial or ventricular arrhythmias. Lead trends stable.    Device check 3/25/22: Normal device function. AP: 78% : <0.1% Presenting EGM: AP-VS @ 83 bpm. 2 seconds of AT/AF. <0.1% AF burden. No ventricular arrhythmias.     TTE 05/17/22: LVEF:60-65%, normal RV function, moderate MR, moderate TR, normal IVC       Dr. Santoyo personally visualized:  Coronary angiogram/RHC/PCI 5/27/22:  90% dRCA in-stent -> PCI+ALVAREZ   50-60% pRCA (return for hemodynamic assessment if symptoms persist/recur)  50-60% small LCx (medical management recommended    RA 13 RV 46/13 PA 46/16 (26) PCW 15  Jamila CO 4.76 CI 2.6  PVR by Jamila CO 2.3    Device interrogation 11/29/22: 71% AP. No AF. Normal device function.       Assessment and Plan:   1. CAD s/p multiple PCIs as above (last to RPDA 7/23/13 and now s/p PCI+DESx1 to dRCA 5/27/22) and known 50-60% pRCA ISR, now with significant improvement in stable angina:   Dyspnea dramatically improved post-PCI-- I believe this was an anginal equivalent (near-normal PCW on RHC at the time of coronary angiogram/PCI supports this.)   If she has recurrent symptoms, repeat coronary angiogram and FFR/iFR of 50-60% pRCA lesion would be worth pursuing  -continue DAPT indefinitely  -continue statin  -If she has recurrent symptoms, repeat coronary angiogram and FFR/iFR of 50-60% pRCA lesion would be worth pursuing       2. Moderate mitral regurgitation, stable  3. Moderate-to-severe tricuspid regurgitation, stable/clinically improved  -no current symptoms, will continue to follow clinically and with periodic TTE       4. Renal Hypertension, controlled  5. CKD stage 4  -continue present therapy  -followed by Odilia Martin and Scar        6. Sick sinus syndrome s/p dual chamber PPM:  -routine device clinic follow up    Tyrell Santoyo MD  Cardiology

## 2022-12-12 DIAGNOSIS — M10.00 IDIOPATHIC GOUT, UNSPECIFIED CHRONICITY, UNSPECIFIED SITE: ICD-10-CM

## 2022-12-13 ENCOUNTER — TELEPHONE (OUTPATIENT)
Dept: PULMONOLOGY | Facility: CLINIC | Age: 87
End: 2022-12-13

## 2022-12-13 DIAGNOSIS — J45.41 MODERATE PERSISTENT ASTHMA WITH EXACERBATION: Primary | ICD-10-CM

## 2022-12-13 RX ORDER — ALLOPURINOL 100 MG/1
100 TABLET ORAL DAILY
Qty: 90 TABLET | Refills: 1 | Status: SHIPPED | OUTPATIENT
Start: 2022-12-13 | End: 2023-06-21

## 2022-12-13 NOTE — TELEPHONE ENCOUNTER
[facility] Call Center    Phone Message    May a detailed message be left on voicemail: yes     Reason for Call: Other: Tessa is calling in asking for a call back. Pt states that when she last saw Dr. Blair, her cough had been under control and she was told to stop using her nebulizer. Pt states that her cough has returned and she is wondering what else can be prescribed to alleviate this. Please call back as soon as possible to discuss.     Action Taken: Message routed to:  [facility] ([facility]): Pulm    Travel Screening: Not Applicable

## 2022-12-13 NOTE — TELEPHONE ENCOUNTER
allopurinol (ZYLOPRIM) 100 MG tablet      Last Written Prescription Date:  12-  Last Fill Quantity: 90,   # refills: 3  Last Office Visit : 07-  Future Office visit:  Not on file    Routing refill request to provider for review/approval because;  Gout Agents Protocol Failed 12/12/2022 11:42 AM   Protocol Details  ALT on file in past 12 months    Has Uric Acid on file in past 12 months and value is less than 6    Normal serum creatinine on file in the past 12 months     Recent Labs   Lab Test 06/25/18  0942   ALT 14     Lab Test 07/08/16  1127   URIC 10.4*     Lab Test 11/21/22  1336   CR 1.90*      Lab Test 11/21/22  1336   WBC 7.2   RBC 3.75*   HGB 11.0*  11.0*   HCT 36.7   *

## 2022-12-13 NOTE — TELEPHONE ENCOUNTER
Spoke with patient re: symptoms. Patient c/o mildly productive strong cough with clear sputum, HIGGINS, and wheezing. Patient denies fever, chills and night sweats. Patient states she has not been exposed to influenza, RSV, or Covid that she is aware of, and has had a negative Covid test recently. She restarted her budesonide neb with some relief, and wonders if she should restart her Advair. She is currently not using any OTC medication, but RN advised she try Robitussin DM for her cough and Mucinex to keep her secretions thin, along with plenty of water. RN will pass to MD to review for recommendations.

## 2022-12-15 ENCOUNTER — TELEPHONE (OUTPATIENT)
Dept: PULMONOLOGY | Facility: CLINIC | Age: 87
End: 2022-12-15

## 2022-12-15 RX ORDER — PREDNISONE 20 MG/1
40 TABLET ORAL DAILY
Qty: 10 TABLET | Refills: 0 | Status: SHIPPED | OUTPATIENT
Start: 2022-12-15 | End: 2022-12-20

## 2022-12-15 NOTE — TELEPHONE ENCOUNTER
Spoke to patient with advisement from Dr Blair to restart Advair for 2-3 days and if not better start Prednisone Dr Blair placed to pharmacy(40 mg times 5 days). Patient confirmed treatment plan.

## 2022-12-19 DIAGNOSIS — R05.3 CHRONIC COUGH: ICD-10-CM

## 2022-12-28 NOTE — PROVIDER NOTIFICATION
----- Message from PHIL Weems sent at 12/28/2022 10:33 AM CST -----  Please inform mom  Electrolytes, blood sugar, kidney and liver function are stable.  Cbc shows no infection or anemias.   Thyroid is normal.  Mono negative  No explanation for for fatigue on labs.   I suspect possibly viral etiology.f/u 1 month if still present.     EP notified at #[id number] regarding patient's BP being 180s/80s-190s/90s, NP will order one time dose of IV Hydralazine. Patient is okay to discharge home this evening, no need for chest xray or device interrogation.

## 2023-01-02 DIAGNOSIS — I10 ESSENTIAL HYPERTENSION: ICD-10-CM

## 2023-01-02 RX ORDER — FLUTICASONE PROPIONATE AND SALMETEROL 250; 50 UG/1; UG/1
POWDER RESPIRATORY (INHALATION)
Qty: 60 EACH | Refills: 8 | Status: SHIPPED | OUTPATIENT
Start: 2023-01-02

## 2023-01-03 RX ORDER — METOPROLOL TARTRATE 100 MG
TABLET ORAL
Qty: 180 TABLET | Refills: 1 | Status: SHIPPED | OUTPATIENT
Start: 2023-01-03 | End: 2023-07-06

## 2023-01-03 NOTE — TELEPHONE ENCOUNTER
Metoprolol Tartrate 100 MG Oral Tablet  Passed protocol    Office Visit  7/15/2022  [facility] Pedro Diaz MD  Family Medicine

## 2023-01-23 DIAGNOSIS — E87.6 HYPOKALEMIA: ICD-10-CM

## 2023-01-26 RX ORDER — POTASSIUM CHLORIDE 750 MG/1
TABLET, EXTENDED RELEASE ORAL
Qty: 270 TABLET | Refills: 0 | Status: SHIPPED | OUTPATIENT
Start: 2023-01-26 | End: 2023-04-26

## 2023-02-07 DIAGNOSIS — I21.4 NSTEMI (NON-ST ELEVATED MYOCARDIAL INFARCTION) (H): ICD-10-CM

## 2023-02-07 DIAGNOSIS — Z95.820 S/P ANGIOPLASTY WITH STENT: ICD-10-CM

## 2023-02-10 RX ORDER — CLOPIDOGREL BISULFATE 75 MG/1
TABLET ORAL
Qty: 90 TABLET | Refills: 1 | Status: SHIPPED | OUTPATIENT
Start: 2023-02-10 | End: 2023-06-15

## 2023-02-10 NOTE — TELEPHONE ENCOUNTER
Clopidogrel         Last Written Prescription Date:  01/31/22  Last Fill Quantity: 90,   # refills: 3  Last Office Visit : 07/15/22  Future Office visit:  None with FP    Routing refill request to provider for review/approval because:  No active PPI on record unless is Protonix    Normal HGB on file in past 12 months    Normal Platelets on file in past 12 months

## 2023-02-16 ENCOUNTER — ANCILLARY PROCEDURE (OUTPATIENT)
Dept: CT IMAGING | Facility: CLINIC | Age: 88
End: 2023-02-16
Attending: INTERNAL MEDICINE
Payer: COMMERCIAL

## 2023-02-16 DIAGNOSIS — R91.1 LUNG NODULE: ICD-10-CM

## 2023-02-16 PROCEDURE — 71250 CT THORAX DX C-: CPT | Performed by: RADIOLOGY

## 2023-02-21 ENCOUNTER — VIRTUAL VISIT (OUTPATIENT)
Dept: PULMONOLOGY | Facility: CLINIC | Age: 88
End: 2023-02-21
Attending: INTERNAL MEDICINE
Payer: COMMERCIAL

## 2023-02-21 DIAGNOSIS — R93.89 ABNORMAL CT OF THE CHEST: Primary | ICD-10-CM

## 2023-02-21 PROCEDURE — 99214 OFFICE O/P EST MOD 30 MIN: CPT | Mod: 95 | Performed by: INTERNAL MEDICINE

## 2023-02-21 NOTE — NURSING NOTE
Is the patient currently in the state of [place]? YES    Visit mode:TELEPHONE    If the visit is dropped, the patient can be reconnected by: TELEPHONE VISIT: Phone number: [phone]    Will anyone else be joining the visit? NO      How would you like to obtain your AVS? MyChart    Are changes needed to the allergy or medication list? NO    Reason for visit: Tessa Mansfield 87 year old female presents today for yearly follow up of lung nodule . Pt reports on going cough and fatigue since November. Pt states she is taking inhaler, neb, OTC-robitussin DM and cough drops to help w/sx. Pt states cough has now improved w/treatment regimen.  Tangela Prieto, Virtual Facilitator

## 2023-02-21 NOTE — LETTER
2/21/2023       RE: Tessa Mansfield  [address]  [facility] [address]     Dear Colleague,    Thank you for referring your patient, Tessa Mansfield, to the [facility] at [facility]. Please see a copy of my visit note below.    [facility]  Interventional Pulmonary Clinic Virtual Visit Note    February 21, 2023    Chief complaint:  Tessa Mansfield is a 87 year old female seen for   Chief Complaint   Patient presents with     Oncology Telephone Visit Return     Lung nodule, f/u       Assessment and Plan:  # Indeterminate pulmonary nodules, largest measuring 8 mm in the right lower lobe, groundglass nodule, is slightly enlarged when compared to previous CT scan from 2014 (abdominal CT with different cuts). CT from Feb 2023 shows increasing mucous plugging in the RLL.   We will repeat CT chest in 1 year which she is in agreement.      # Chronic cough seen by Dr. Blair recently and trial of prednisone (20 mg 10 days) and doxycycline helped for short period of time but her cough is back.  She has no GERD and using her omeprazole on a daily basis.  Denied to have postnasal drainage but his ongoing runny nose.  Inhaled steroids may help as planned by Dr. Blair.     # Former smoker, quit more than 20 years ago.     History of Present Illness:  This is a 86 years old woman accompanied by her  today.  Recently seen in pulmonary clinic and referred to our clinic for further evaluation and follow-up of pulmonary nodules.  She has no history of exposure to chemicals, radiation or asbestos.  She has no known pulmonary problems in the past.  She has been diagnosed of COVID infection in February 2021 and few months later she started having cough which has been going on.  Her cough responded to prednisone and antibiotic for a short period of time but it is back.  No dyspnea on exertion.    Allergies   Allergen Reactions     Bactrim  "[Sulfamethoxazole W/Trimethoprim] Other (See Comments)     Patient unable to recall     Biaxin [Clarithromycin]      Chlorthalidone Nausea and Vomiting     Clonidine      Codeine Sulfate Itching     Darvon [Propoxyphene Hcl]      Dilaudid [Hydromorphone] Visual Disturbance     Hallucinations       Gabapentin Other (See Comments) and Fatigue     \"felt drunk\"     Indocin      Levaquin [Levofloxacin Hemihydrate]      Lyrica Fatigue     Morphine Sulfate      Percocet [Oxycodone-Acetaminophen] Other (See Comments)     hallucinations     Pregabalin Itching     Other reaction(s): Fatigue     Shrimp Swelling     Spironolactone Other (See Comments)     Dehydrated       Terazosin      Ciprofloxacin Rash     Simvastatin Palpitations     Muscle weakness, leg cramping          Past Medical History:   Diagnosis Date     CAD (coronary artery disease)      CAD s/p PCI+BMS to MRCA 10/2002, PCI to mLCX 2/2003, PCI+DESx2 to pLAD 11/2007, PCI+DESx1 to dRCA 12/2007, PCI+ALVAREZ to RPDA 7/2013; on indefinite DAPT  10/27/2002    10/27/2002: AMI s/p PCI+BMS (3.5x18mm Bx velocity) to mRCA 2/5/2003: Back pain; PCI+stent to mLCX c/b distal embolization/slow flow 4/11/2003: Unstable angina; PCI+stent (Hepacoat velocity) to mLAD 11/27/2007: PCI+DESx2 to pLAD (IVUS w/ calcification of LMCA and ulcerated plaque pLAD, 80% dRCA; also had 80% dLCX, too small to stent) 12/11/2007: Staged PCI. PCI+DESx1 (3.5x13mm Cypher) to dRCA (indefinite DAPT recommended at this time) 6/27/2008: Angina. mLCX stent 70% ISR. LAD and RCA stents patent. Myocardium at risk from LCX felt to be small, medical management preferred to PCI. 11/10/2009: Angina. Findings unchanged from 6/27/2008, FFR LAD 0.90. Medical management recommended. 7/23/2013: Unstable angina; PCI+ALVAREZ to mPDA. Diagnostic findings: LMCA 40% distal. LAD: pLAD stents patent mLAD 30% ISR dLAD 50% diffuse, D2 diffuse disease. LCX 80% mid ISR. RCA diffuse <30% mid, RPLAs diffuse disease, RPDA 100% occlusion.   "     Cardiac Pacemaker- Medtronic, dual chamber- NOT dependant 11/28/2007     CKD (chronic kidney disease), stage IV (H)      Hyperlipidemia LDL goal <70      Hypertension      Stented coronary artery 10-    RCA     Stented coronary artery 2-5-2003    LCx     Stented coronary artery 4-    LAD     Stented coronary artery 11-    LAD     Stented coronary artery 12-    RCA     Transient complete heart block (H) 11/28/2007        Past Surgical History:   Procedure Laterality Date     CHOLECYSTECTOMY       CV CORONARY ANGIOGRAM N/A 6/17/2022    Procedure: Coronary Angiogram;  Surgeon: Constantine Macias MD;  Location:  HEART CARDIAC CATH LAB     CV PCI N/A 6/17/2022    Procedure: Percutaneous Coronary Intervention;  Surgeon: Constantine Macias MD;  Location:  HEART CARDIAC CATH LAB     CV RIGHT HEART CATH MEASUREMENTS RECORDED N/A 6/17/2022    Procedure: Right Heart Catheterization;  Surgeon: Constantine Macias MD;  Location:  HEART CARDIAC CATH LAB     EP PACEMAKER N/A 10/15/2019    Procedure: EP PACEMAKER;  Surgeon: Anthony Zhu MD;  Location:  HEART CARDIAC CATH LAB     HC PPM INSERTION NEW/REPLACEMENT W/ ATRIAL&VENTRICULAR LEAD  11-     HYSTERECTOMY          Social History     Socioeconomic History     Marital status:      Spouse name: Not on file     Number of children: 4     Years of education: Not on file     Highest education level: Not on file   Occupational History     Employer: [employer]   Tobacco Use     Smoking status: Former     Packs/day: 0.30     Years: 15.00     Pack years: 4.50     Types: Cigarettes     Smokeless tobacco: Never     Tobacco comments:     Quit 22+ years ago   Substance and Sexual Activity     Alcohol use: Not on file     Drug use: No     Sexual activity: Not Currently     Partners: Male     Birth control/protection: Post-menopausal   Other Topics Concern      Service Not Asked     Blood Transfusions  Yes     Caffeine Concern Not Asked     Occupational Exposure Not Asked     Hobby Hazards Not Asked     Sleep Concern Not Asked     Stress Concern Not Asked     Weight Concern Not Asked     Special Diet Not Asked     Back Care Not Asked     Exercise No     Bike Helmet Not Asked     Seat Belt Not Asked     Self-Exams Not Asked     Parent/sibling w/ CABG, MI or angioplasty before 65F 55M? Not Asked   Social History Narrative     Not on file     Social Determinants of Health     Financial Resource Strain: Not on file   Food Insecurity: Not on file   Transportation Needs: Not on file   Physical Activity: Not on file   Stress: Not on file   Social Connections: Not on file   Intimate Partner Violence: Not on file   Housing Stability: Not on file        Family History   Problem Relation Age of Onset     Cancer Sister 82        bladder cancer        Immunization History   Administered Date(s) Administered     COVID-19 Vaccine 18+ (Moderna) 02/13/2021, 03/13/2021     COVID-19,PF,Moderna Booster 11/23/2021     Flu 65+ Years 10/04/2018, 10/18/2019     Influenza (H1N1) 02/09/2010     Influenza (High Dose) 3 valent vaccine 12/18/2015, 09/28/2016, 10/11/2017     Influenza (IIV3) PF 10/29/2004, 11/02/2005, 10/24/2006, 10/31/2007, 12/19/2008, 10/16/2010, 09/19/2012, 12/30/2014     Pneumo Conj 13-V (2010&after) 12/30/2014     Pneumococcal 23 valent 12/06/2002, 09/19/2012     TD (ADULT, 7+) 12/06/2002, 02/13/2004     TDAP Vaccine (Boostrix) 09/11/2012     Tdap (Adacel,Boostrix) 02/21/2013     Zoster vaccine, live 02/18/2011       Current Outpatient Medications   Medication Sig     clopidogrel (PLAVIX) 75 MG tablet Take 1 tablet by mouth once daily     allopurinol (ZYLOPRIM) 100 MG tablet Take 1 tablet (100 mg) by mouth daily     amitriptyline (ELAVIL) 25 MG tablet Take 1 tablet (25 mg) by mouth At Bedtime (Patient not taking: Reported on 12/6/2022)     amLODIPine (NORVASC) 10 MG tablet Take 1 tablet (10 mg) by mouth daily     aspirin  81 MG tablet Take 1 tablet by mouth daily.     budesonide (PULMICORT) 0.5 MG/2ML neb solution Take 2 mLs (0.5 mg) by nebulization 2 times daily (Patient not taking: Reported on 12/6/2022)     cyanocolbalamin (VITAMIN B-12) 1000 MCG tablet Take 500 mcg by mouth daily As directed     fluticasone-salmeterol (ADVAIR) 250-50 MCG/ACT inhaler INHALE 1 DOSE BY MOUTH TWICE DAILY     furosemide (LASIX) 40 MG tablet Take 1 tablet (40 mg) by mouth 2 times daily     isosorbide mononitrate (ISMO/MONOKET) 20 MG tablet Take 2 tablets (40 mg) by mouth 3 times daily     metoprolol tartrate (LOPRESSOR) 100 MG tablet Take 1 tablet by mouth twice daily     Multiple Vitamins-Minerals (PRESERVISION AREDS 2+MULTI VIT PO) Take by mouth 2 times daily     omeprazole (PRILOSEC) 40 MG DR capsule Take 1 capsule (40 mg) by mouth daily     potassium chloride ER (K-TAB/KLOR-CON) 10 MEQ CR tablet TAKE 2 TABLETS BY MOUTH IN THE MORNING AND TAKE 1 TABLET ONCE DAILY IN THE EVENING     pravastatin (PRAVACHOL) 80 MG tablet Take 1 tablet (80 mg) by mouth daily     timolol maleate (TIMOPTIC) 0.5 % ophthalmic solution INSTILL 1 DROP INTO EACH EYE ONCE DAILY IN THE MORNING     valACYclovir (VALTREX) 1000 mg tablet Take 1 tablet (1,000 mg) by mouth every 12 hours for 7 days     VITAMIN D3 25 MCG (1000 UT) tablet TAKE 1 TABLET BY MOUTH ONCE DAILY **DUE TO BE SEEN FOR FURTHER REFILLS**     No current facility-administered medications for this visit.        Review of Systems:  I have done 10 points of review systems and all negative except for those mentioned in HPI    Physical examination  Constitutional: Oriented, not in distress  Respiratory: Normal tidal breathing, no shortness of breath, no audible wheezing or stridor over the phone   Psychiatric:  Mood and affect are appropriate with insight into his/her medical condition    Data:  Lab Results   Component Value Date    WBC 7.2 11/21/2022    WBC 5.4 08/26/2020     Lab Results   Component Value Date    RBC  3.75 11/21/2022    RBC 3.58 08/26/2020     Lab Results   Component Value Date    HGB 11.0 11/21/2022    HGB 11.0 11/21/2022    HGB 11.2 08/26/2020     Lab Results   Component Value Date    HCT 36.7 11/21/2022    HCT 36.0 08/26/2020     Lab Results   Component Value Date    MCV 98 11/21/2022     08/26/2020     Lab Results   Component Value Date    MCH 29.3 11/21/2022    MCH 31.3 08/26/2020     Lab Results   Component Value Date    MCHC 30.6 11/21/2022    MCHC 31.1 08/26/2020     Lab Results   Component Value Date    RDW 14.8 11/21/2022    RDW 14.9 08/26/2020     Lab Results   Component Value Date     11/21/2022     08/26/2020       Lab Results   Component Value Date     11/21/2022     08/26/2020      Lab Results   Component Value Date    POTASSIUM 4.8 11/21/2022    POTASSIUM 4.4 08/26/2020     Lab Results   Component Value Date    CHLORIDE 110 11/21/2022    CHLORIDE 111 08/26/2020     Lab Results   Component Value Date    ALEXANDRO 8.6 11/21/2022    ALEXANDRO 9.0 08/26/2020     Lab Results   Component Value Date    CO2 24 11/21/2022    CO2 24 08/26/2020     Lab Results   Component Value Date    BUN 35 11/21/2022    BUN 41 08/26/2020     Lab Results   Component Value Date    CR 1.90 11/21/2022    CR 1.70 08/26/2020     Lab Results   Component Value Date    GLC 79 11/21/2022    GLC 84 08/26/2020         HAVEN Singh MD

## 2023-02-21 NOTE — PROGRESS NOTES
[facility]  Interventional Pulmonary Clinic Virtual Visit Note    February 21, 2023    Chief complaint:  Tessa Mansfield is a 87 year old female seen for   Chief Complaint   Patient presents with     Oncology Telephone Visit Return     Lung nodule, f/u       Assessment and Plan:  # Indeterminate pulmonary nodules, largest measuring 8 mm in the right lower lobe, groundglass nodule, is slightly enlarged when compared to previous CT scan from 2014 (abdominal CT with different cuts). CT from Feb 2023 shows increasing mucous plugging in the RLL.   We will repeat CT chest in 1 year which she is in agreement.      # Chronic cough seen by Dr. Blair recently and trial of prednisone (20 mg 10 days) and doxycycline helped for short period of time but her cough is back.  She has no GERD and using her omeprazole on a daily basis.  Denied to have postnasal drainage but his ongoing runny nose.  Inhaled steroids may help as planned by Dr. Blair.     # Former smoker, quit more than 20 years ago.     History of Present Illness:  This is a 86 years old woman accompanied by her  today.  Recently seen in pulmonary clinic and referred to our clinic for further evaluation and follow-up of pulmonary nodules.  She has no history of exposure to chemicals, radiation or asbestos.  She has no known pulmonary problems in the past.  She has been diagnosed of COVID infection in February 2021 and few months later she started having cough which has been going on.  Her cough responded to prednisone and antibiotic for a short period of time but it is back.  No dyspnea on exertion.    Allergies   Allergen Reactions     Bactrim [Sulfamethoxazole W/Trimethoprim] Other (See Comments)     Patient unable to recall     Biaxin [Clarithromycin]      Chlorthalidone Nausea and Vomiting     Clonidine      Codeine Sulfate Itching     Darvon [Propoxyphene Hcl]      Dilaudid [Hydromorphone] Visual Disturbance     Hallucinations       Gabapentin  "Other (See Comments) and Fatigue     \"felt drunk\"     Indocin      Levaquin [Levofloxacin Hemihydrate]      Lyrica Fatigue     Morphine Sulfate      Percocet [Oxycodone-Acetaminophen] Other (See Comments)     hallucinations     Pregabalin Itching     Other reaction(s): Fatigue     Shrimp Swelling     Spironolactone Other (See Comments)     Dehydrated       Terazosin      Ciprofloxacin Rash     Simvastatin Palpitations     Muscle weakness, leg cramping          Past Medical History:   Diagnosis Date     CAD (coronary artery disease)      CAD s/p PCI+BMS to MRCA 10/2002, PCI to mLCX 2/2003, PCI+DESx2 to pLAD 11/2007, PCI+DESx1 to dRCA 12/2007, PCI+ALVAREZ to RPDA 7/2013; on indefinite DAPT  10/27/2002    10/27/2002: AMI s/p PCI+BMS (3.5x18mm Bx velocity) to mRCA 2/5/2003: Back pain; PCI+stent to mLCX c/b distal embolization/slow flow 4/11/2003: Unstable angina; PCI+stent (Hepacoat velocity) to mLAD 11/27/2007: PCI+DESx2 to pLAD (IVUS w/ calcification of LMCA and ulcerated plaque pLAD, 80% dRCA; also had 80% dLCX, too small to stent) 12/11/2007: Staged PCI. PCI+DESx1 (3.5x13mm Cypher) to dRCA (indefinite DAPT recommended at this time) 6/27/2008: Angina. mLCX stent 70% ISR. LAD and RCA stents patent. Myocardium at risk from LCX felt to be small, medical management preferred to PCI. 11/10/2009: Angina. Findings unchanged from 6/27/2008, FFR LAD 0.90. Medical management recommended. 7/23/2013: Unstable angina; PCI+ALVAREZ to mPDA. Diagnostic findings: LMCA 40% distal. LAD: pLAD stents patent mLAD 30% ISR dLAD 50% diffuse, D2 diffuse disease. LCX 80% mid ISR. RCA diffuse <30% mid, RPLAs diffuse disease, RPDA 100% occlusion.       Cardiac Pacemaker- Medtronic, dual chamber- NOT dependant 11/28/2007     CKD (chronic kidney disease), stage IV (H)      Hyperlipidemia LDL goal <70      Hypertension      Stented coronary artery 10-    RCA     Stented coronary artery 2-5-2003    LCx     Stented coronary artery 4-    LAD     " Stented coronary artery 11-    LAD     Stented coronary artery 12-    RCA     Transient complete heart block (H) 11/28/2007        Past Surgical History:   Procedure Laterality Date     CHOLECYSTECTOMY       CV CORONARY ANGIOGRAM N/A 6/17/2022    Procedure: Coronary Angiogram;  Surgeon: Constantine Macias MD;  Location:  HEART CARDIAC CATH LAB     CV PCI N/A 6/17/2022    Procedure: Percutaneous Coronary Intervention;  Surgeon: Consatntine Macias MD;  Location:  HEART CARDIAC CATH LAB     CV RIGHT HEART CATH MEASUREMENTS RECORDED N/A 6/17/2022    Procedure: Right Heart Catheterization;  Surgeon: Constantine Macias MD;  Location:  HEART CARDIAC CATH LAB     EP PACEMAKER N/A 10/15/2019    Procedure: EP PACEMAKER;  Surgeon: Anthony Zhu MD;  Location:  HEART CARDIAC CATH LAB     HC PPM INSERTION NEW/REPLACEMENT W/ ATRIAL&VENTRICULAR LEAD  11-     HYSTERECTOMY          Social History     Socioeconomic History     Marital status:      Spouse name: Not on file     Number of children: 4     Years of education: Not on file     Highest education level: Not on file   Occupational History     Employer: [employer]   Tobacco Use     Smoking status: Former     Packs/day: 0.30     Years: 15.00     Pack years: 4.50     Types: Cigarettes     Smokeless tobacco: Never     Tobacco comments:     Quit 22+ years ago   Substance and Sexual Activity     Alcohol use: Not on file     Drug use: No     Sexual activity: Not Currently     Partners: Male     Birth control/protection: Post-menopausal   Other Topics Concern      Service Not Asked     Blood Transfusions Yes     Caffeine Concern Not Asked     Occupational Exposure Not Asked     Hobby Hazards Not Asked     Sleep Concern Not Asked     Stress Concern Not Asked     Weight Concern Not Asked     Special Diet Not Asked     Back Care Not Asked     Exercise No     Bike Helmet Not Asked     Seat Belt Not Asked      Self-Exams Not Asked     Parent/sibling w/ CABG, MI or angioplasty before 65F 55M? Not Asked   Social History Narrative     Not on file     Social Determinants of Health     Financial Resource Strain: Not on file   Food Insecurity: Not on file   Transportation Needs: Not on file   Physical Activity: Not on file   Stress: Not on file   Social Connections: Not on file   Intimate Partner Violence: Not on file   Housing Stability: Not on file        Family History   Problem Relation Age of Onset     Cancer Sister 82        bladder cancer        Immunization History   Administered Date(s) Administered     COVID-19 Vaccine 18+ (Moderna) 02/13/2021, 03/13/2021     COVID-19,PF,Moderna Booster 11/23/2021     Flu 65+ Years 10/04/2018, 10/18/2019     Influenza (H1N1) 02/09/2010     Influenza (High Dose) 3 valent vaccine 12/18/2015, 09/28/2016, 10/11/2017     Influenza (IIV3) PF 10/29/2004, 11/02/2005, 10/24/2006, 10/31/2007, 12/19/2008, 10/16/2010, 09/19/2012, 12/30/2014     Pneumo Conj 13-V (2010&after) 12/30/2014     Pneumococcal 23 valent 12/06/2002, 09/19/2012     TD (ADULT, 7+) 12/06/2002, 02/13/2004     TDAP Vaccine (Boostrix) 09/11/2012     Tdap (Adacel,Boostrix) 02/21/2013     Zoster vaccine, live 02/18/2011       Current Outpatient Medications   Medication Sig     clopidogrel (PLAVIX) 75 MG tablet Take 1 tablet by mouth once daily     allopurinol (ZYLOPRIM) 100 MG tablet Take 1 tablet (100 mg) by mouth daily     amitriptyline (ELAVIL) 25 MG tablet Take 1 tablet (25 mg) by mouth At Bedtime (Patient not taking: Reported on 12/6/2022)     amLODIPine (NORVASC) 10 MG tablet Take 1 tablet (10 mg) by mouth daily     aspirin 81 MG tablet Take 1 tablet by mouth daily.     budesonide (PULMICORT) 0.5 MG/2ML neb solution Take 2 mLs (0.5 mg) by nebulization 2 times daily (Patient not taking: Reported on 12/6/2022)     cyanocolbalamin (VITAMIN B-12) 1000 MCG tablet Take 500 mcg by mouth daily As directed     fluticasone-salmeterol  (ADVAIR) 250-50 MCG/ACT inhaler INHALE 1 DOSE BY MOUTH TWICE DAILY     furosemide (LASIX) 40 MG tablet Take 1 tablet (40 mg) by mouth 2 times daily     isosorbide mononitrate (ISMO/MONOKET) 20 MG tablet Take 2 tablets (40 mg) by mouth 3 times daily     metoprolol tartrate (LOPRESSOR) 100 MG tablet Take 1 tablet by mouth twice daily     Multiple Vitamins-Minerals (PRESERVISION AREDS 2+MULTI VIT PO) Take by mouth 2 times daily     omeprazole (PRILOSEC) 40 MG DR capsule Take 1 capsule (40 mg) by mouth daily     potassium chloride ER (K-TAB/KLOR-CON) 10 MEQ CR tablet TAKE 2 TABLETS BY MOUTH IN THE MORNING AND TAKE 1 TABLET ONCE DAILY IN THE EVENING     pravastatin (PRAVACHOL) 80 MG tablet Take 1 tablet (80 mg) by mouth daily     timolol maleate (TIMOPTIC) 0.5 % ophthalmic solution INSTILL 1 DROP INTO EACH EYE ONCE DAILY IN THE MORNING     valACYclovir (VALTREX) 1000 mg tablet Take 1 tablet (1,000 mg) by mouth every 12 hours for 7 days     VITAMIN D3 25 MCG (1000 UT) tablet TAKE 1 TABLET BY MOUTH ONCE DAILY **DUE TO BE SEEN FOR FURTHER REFILLS**     No current facility-administered medications for this visit.        Review of Systems:  I have done 10 points of review systems and all negative except for those mentioned in HPI    Physical examination  Constitutional: Oriented, not in distress  Respiratory: Normal tidal breathing, no shortness of breath, no audible wheezing or stridor over the phone   Psychiatric:  Mood and affect are appropriate with insight into his/her medical condition    Data:  Lab Results   Component Value Date    WBC 7.2 11/21/2022    WBC 5.4 08/26/2020     Lab Results   Component Value Date    RBC 3.75 11/21/2022    RBC 3.58 08/26/2020     Lab Results   Component Value Date    HGB 11.0 11/21/2022    HGB 11.0 11/21/2022    HGB 11.2 08/26/2020     Lab Results   Component Value Date    HCT 36.7 11/21/2022    HCT 36.0 08/26/2020     Lab Results   Component Value Date    MCV 98 11/21/2022      08/26/2020     Lab Results   Component Value Date    MCH 29.3 11/21/2022    MCH 31.3 08/26/2020     Lab Results   Component Value Date    MCHC 30.6 11/21/2022    MCHC 31.1 08/26/2020     Lab Results   Component Value Date    RDW 14.8 11/21/2022    RDW 14.9 08/26/2020     Lab Results   Component Value Date     11/21/2022     08/26/2020       Lab Results   Component Value Date     11/21/2022     08/26/2020      Lab Results   Component Value Date    POTASSIUM 4.8 11/21/2022    POTASSIUM 4.4 08/26/2020     Lab Results   Component Value Date    CHLORIDE 110 11/21/2022    CHLORIDE 111 08/26/2020     Lab Results   Component Value Date    ALEXANDRO 8.6 11/21/2022    ALEXANDRO 9.0 08/26/2020     Lab Results   Component Value Date    CO2 24 11/21/2022    CO2 24 08/26/2020     Lab Results   Component Value Date    BUN 35 11/21/2022    BUN 41 08/26/2020     Lab Results   Component Value Date    CR 1.90 11/21/2022    CR 1.70 08/26/2020     Lab Results   Component Value Date    GLC 79 11/21/2022    GLC 84 08/26/2020         HAVEN Singh MD

## 2023-02-26 ENCOUNTER — ANCILLARY PROCEDURE (OUTPATIENT)
Dept: CARDIOLOGY | Facility: CLINIC | Age: 88
End: 2023-02-26
Attending: INTERNAL MEDICINE
Payer: COMMERCIAL

## 2023-02-26 DIAGNOSIS — I49.5 SICK SINUS SYNDROME (H): ICD-10-CM

## 2023-02-26 PROCEDURE — 93296 REM INTERROG EVL PM/IDS: CPT

## 2023-02-26 PROCEDURE — 93294 REM INTERROG EVL PM/LDLS PM: CPT | Performed by: INTERNAL MEDICINE

## 2023-03-06 LAB
MDC_IDC_LEAD_IMPLANT_DT: NORMAL
MDC_IDC_LEAD_IMPLANT_DT: NORMAL
MDC_IDC_LEAD_LOCATION: NORMAL
MDC_IDC_LEAD_LOCATION: NORMAL
MDC_IDC_LEAD_LOCATION_DETAIL_1: NORMAL
MDC_IDC_LEAD_LOCATION_DETAIL_1: NORMAL
MDC_IDC_LEAD_MFG: NORMAL
MDC_IDC_LEAD_MFG: NORMAL
MDC_IDC_LEAD_MODEL: NORMAL
MDC_IDC_LEAD_MODEL: NORMAL
MDC_IDC_LEAD_POLARITY_TYPE: NORMAL
MDC_IDC_LEAD_POLARITY_TYPE: NORMAL
MDC_IDC_LEAD_SERIAL: NORMAL
MDC_IDC_LEAD_SERIAL: NORMAL
MDC_IDC_LEAD_SPECIAL_FUNCTION: NORMAL
MDC_IDC_LEAD_SPECIAL_FUNCTION: NORMAL
MDC_IDC_MSMT_BATTERY_DTM: NORMAL
MDC_IDC_MSMT_BATTERY_REMAINING_LONGEVITY: 123 MO
MDC_IDC_MSMT_BATTERY_RRT_TRIGGER: 2.62
MDC_IDC_MSMT_BATTERY_STATUS: NORMAL
MDC_IDC_MSMT_BATTERY_VOLTAGE: 3.01 V
MDC_IDC_MSMT_LEADCHNL_RA_IMPEDANCE_VALUE: 323 OHM
MDC_IDC_MSMT_LEADCHNL_RA_IMPEDANCE_VALUE: 361 OHM
MDC_IDC_MSMT_LEADCHNL_RA_PACING_THRESHOLD_AMPLITUDE: 0.5 V
MDC_IDC_MSMT_LEADCHNL_RA_PACING_THRESHOLD_PULSEWIDTH: 0.4 MS
MDC_IDC_MSMT_LEADCHNL_RA_SENSING_INTR_AMPL: 2.4 MV
MDC_IDC_MSMT_LEADCHNL_RV_IMPEDANCE_VALUE: 418 OHM
MDC_IDC_MSMT_LEADCHNL_RV_IMPEDANCE_VALUE: 456 OHM
MDC_IDC_MSMT_LEADCHNL_RV_PACING_THRESHOLD_AMPLITUDE: 0.88 V
MDC_IDC_MSMT_LEADCHNL_RV_PACING_THRESHOLD_PULSEWIDTH: 0.4 MS
MDC_IDC_MSMT_LEADCHNL_RV_SENSING_INTR_AMPL: 9.8 MV
MDC_IDC_PG_IMPLANT_DTM: NORMAL
MDC_IDC_PG_MFG: NORMAL
MDC_IDC_PG_MODEL: NORMAL
MDC_IDC_PG_SERIAL: NORMAL
MDC_IDC_PG_TYPE: NORMAL
MDC_IDC_SESS_CLINIC_NAME: NORMAL
MDC_IDC_SESS_DTM: NORMAL
MDC_IDC_SESS_TYPE: NORMAL
MDC_IDC_SET_BRADY_AT_MODE_SWITCH_RATE: 171 {BEATS}/MIN
MDC_IDC_SET_BRADY_HYSTRATE: NORMAL
MDC_IDC_SET_BRADY_LOWRATE: 60 {BEATS}/MIN
MDC_IDC_SET_BRADY_MAX_SENSOR_RATE: 130 {BEATS}/MIN
MDC_IDC_SET_BRADY_MAX_TRACKING_RATE: 130 {BEATS}/MIN
MDC_IDC_SET_BRADY_MODE: NORMAL
MDC_IDC_SET_BRADY_PAV_DELAY_LOW: 180 MS
MDC_IDC_SET_BRADY_SAV_DELAY_LOW: 150 MS
MDC_IDC_SET_LEADCHNL_RA_PACING_AMPLITUDE: 1.5 V
MDC_IDC_SET_LEADCHNL_RA_PACING_ANODE_ELECTRODE_1: NORMAL
MDC_IDC_SET_LEADCHNL_RA_PACING_ANODE_LOCATION_1: NORMAL
MDC_IDC_SET_LEADCHNL_RA_PACING_CAPTURE_MODE: NORMAL
MDC_IDC_SET_LEADCHNL_RA_PACING_CATHODE_ELECTRODE_1: NORMAL
MDC_IDC_SET_LEADCHNL_RA_PACING_CATHODE_LOCATION_1: NORMAL
MDC_IDC_SET_LEADCHNL_RA_PACING_POLARITY: NORMAL
MDC_IDC_SET_LEADCHNL_RA_PACING_PULSEWIDTH: 0.4 MS
MDC_IDC_SET_LEADCHNL_RA_SENSING_ANODE_ELECTRODE_1: NORMAL
MDC_IDC_SET_LEADCHNL_RA_SENSING_ANODE_LOCATION_1: NORMAL
MDC_IDC_SET_LEADCHNL_RA_SENSING_CATHODE_ELECTRODE_1: NORMAL
MDC_IDC_SET_LEADCHNL_RA_SENSING_CATHODE_LOCATION_1: NORMAL
MDC_IDC_SET_LEADCHNL_RA_SENSING_POLARITY: NORMAL
MDC_IDC_SET_LEADCHNL_RA_SENSING_SENSITIVITY: 0.3 MV
MDC_IDC_SET_LEADCHNL_RV_PACING_AMPLITUDE: 2 V
MDC_IDC_SET_LEADCHNL_RV_PACING_ANODE_ELECTRODE_1: NORMAL
MDC_IDC_SET_LEADCHNL_RV_PACING_ANODE_LOCATION_1: NORMAL
MDC_IDC_SET_LEADCHNL_RV_PACING_CAPTURE_MODE: NORMAL
MDC_IDC_SET_LEADCHNL_RV_PACING_CATHODE_ELECTRODE_1: NORMAL
MDC_IDC_SET_LEADCHNL_RV_PACING_CATHODE_LOCATION_1: NORMAL
MDC_IDC_SET_LEADCHNL_RV_PACING_POLARITY: NORMAL
MDC_IDC_SET_LEADCHNL_RV_PACING_PULSEWIDTH: 0.4 MS
MDC_IDC_SET_LEADCHNL_RV_SENSING_ANODE_ELECTRODE_1: NORMAL
MDC_IDC_SET_LEADCHNL_RV_SENSING_ANODE_LOCATION_1: NORMAL
MDC_IDC_SET_LEADCHNL_RV_SENSING_CATHODE_ELECTRODE_1: NORMAL
MDC_IDC_SET_LEADCHNL_RV_SENSING_CATHODE_LOCATION_1: NORMAL
MDC_IDC_SET_LEADCHNL_RV_SENSING_POLARITY: NORMAL
MDC_IDC_SET_LEADCHNL_RV_SENSING_SENSITIVITY: 0.9 MV
MDC_IDC_SET_ZONE_DETECTION_INTERVAL: 350 MS
MDC_IDC_SET_ZONE_DETECTION_INTERVAL: 400 MS
MDC_IDC_SET_ZONE_TYPE: NORMAL
MDC_IDC_STAT_AT_BURDEN_PERCENT: 0 %
MDC_IDC_STAT_AT_DTM_END: NORMAL
MDC_IDC_STAT_AT_DTM_START: NORMAL
MDC_IDC_STAT_BRADY_AP_VP_PERCENT: 0.07 %
MDC_IDC_STAT_BRADY_AP_VS_PERCENT: 73.6 %
MDC_IDC_STAT_BRADY_AS_VP_PERCENT: 0.02 %
MDC_IDC_STAT_BRADY_AS_VS_PERCENT: 26.31 %
MDC_IDC_STAT_BRADY_DTM_END: NORMAL
MDC_IDC_STAT_BRADY_DTM_START: NORMAL
MDC_IDC_STAT_BRADY_RA_PERCENT_PACED: 78.48 %
MDC_IDC_STAT_BRADY_RV_PERCENT_PACED: 0.09 %
MDC_IDC_STAT_EPISODE_RECENT_COUNT: 0
MDC_IDC_STAT_EPISODE_RECENT_COUNT_DTM_END: NORMAL
MDC_IDC_STAT_EPISODE_RECENT_COUNT_DTM_START: NORMAL
MDC_IDC_STAT_EPISODE_TOTAL_COUNT: 0
MDC_IDC_STAT_EPISODE_TOTAL_COUNT: 1
MDC_IDC_STAT_EPISODE_TOTAL_COUNT_DTM_END: NORMAL
MDC_IDC_STAT_EPISODE_TOTAL_COUNT_DTM_START: NORMAL
MDC_IDC_STAT_EPISODE_TYPE: NORMAL

## 2023-03-23 ENCOUNTER — TRANSFERRED RECORDS (OUTPATIENT)
Dept: HEALTH INFORMATION MANAGEMENT | Facility: CLINIC | Age: 88
End: 2023-03-23
Payer: COMMERCIAL

## 2023-04-04 DIAGNOSIS — N18.4 CKD (CHRONIC KIDNEY DISEASE) STAGE 4, GFR 15-29 ML/MIN (H): ICD-10-CM

## 2023-04-04 DIAGNOSIS — N25.81 SECONDARY RENAL HYPERPARATHYROIDISM (H): ICD-10-CM

## 2023-04-06 RX ORDER — CHOLECALCIFEROL (VITAMIN D3) 25 MCG
TABLET ORAL
Qty: 90 TABLET | Refills: 3 | Status: SHIPPED | OUTPATIENT
Start: 2023-04-06 | End: 2024-02-19

## 2023-04-11 ENCOUNTER — DOCUMENTATION ONLY (OUTPATIENT)
Dept: LAB | Facility: CLINIC | Age: 88
End: 2023-04-11

## 2023-04-11 NOTE — PROGRESS NOTES
This patient has a future lab only appointment and needs orders. Please send orders. Thanks, [facility]

## 2023-04-20 ENCOUNTER — DOCUMENTATION ONLY (OUTPATIENT)
Dept: LAB | Facility: CLINIC | Age: 88
End: 2023-04-20

## 2023-04-20 NOTE — PROGRESS NOTES
This patient has a future lab only appointment and needs orders for a renal check. She also has things in health maintenance. Please send orders. Thanks [facility]

## 2023-04-24 ENCOUNTER — APPOINTMENT (OUTPATIENT)
Dept: LAB | Facility: CLINIC | Age: 88
End: 2023-04-24
Payer: COMMERCIAL

## 2023-04-24 DIAGNOSIS — N18.4 CKD (CHRONIC KIDNEY DISEASE) STAGE 4, GFR 15-29 ML/MIN (H): Primary | ICD-10-CM

## 2023-04-25 ENCOUNTER — LAB (OUTPATIENT)
Dept: LAB | Facility: CLINIC | Age: 88
End: 2023-04-25
Payer: COMMERCIAL

## 2023-04-25 DIAGNOSIS — N18.4 CKD (CHRONIC KIDNEY DISEASE) STAGE 4, GFR 15-29 ML/MIN (H): ICD-10-CM

## 2023-04-25 DIAGNOSIS — E87.6 HYPOKALEMIA: ICD-10-CM

## 2023-04-25 LAB
ALBUMIN SERPL BCG-MCNC: 4.3 G/DL (ref 3.5–5.2)
ANION GAP SERPL CALCULATED.3IONS-SCNC: 12 MMOL/L (ref 7–15)
BUN SERPL-MCNC: 25.9 MG/DL (ref 8–23)
CALCIUM SERPL-MCNC: 9.2 MG/DL (ref 8.8–10.2)
CHLORIDE SERPL-SCNC: 110 MMOL/L (ref 98–107)
CREAT SERPL-MCNC: 1.7 MG/DL (ref 0.51–0.95)
CREAT UR-MCNC: 73.2 MG/DL
DEPRECATED CALCIDIOL+CALCIFEROL SERPL-MC: 59 UG/L (ref 20–75)
DEPRECATED HCO3 PLAS-SCNC: 23 MMOL/L (ref 22–29)
GFR SERPL CREATININE-BSD FRML MDRD: 29 ML/MIN/1.73M2
GLUCOSE SERPL-MCNC: 94 MG/DL (ref 70–99)
HGB BLD-MCNC: 11 G/DL (ref 11.7–15.7)
MICROALBUMIN UR-MCNC: 861 MG/L
MICROALBUMIN/CREAT UR: 1176.23 MG/G CR (ref 0–25)
PHOSPHATE SERPL-MCNC: 3.2 MG/DL (ref 2.5–4.5)
POTASSIUM SERPL-SCNC: 4 MMOL/L (ref 3.4–5.3)
PTH-INTACT SERPL-MCNC: 133 PG/ML (ref 15–65)
SODIUM SERPL-SCNC: 145 MMOL/L (ref 136–145)

## 2023-04-25 PROCEDURE — 36415 COLL VENOUS BLD VENIPUNCTURE: CPT

## 2023-04-25 PROCEDURE — 82570 ASSAY OF URINE CREATININE: CPT

## 2023-04-25 PROCEDURE — 82043 UR ALBUMIN QUANTITATIVE: CPT

## 2023-04-25 PROCEDURE — 85018 HEMOGLOBIN: CPT

## 2023-04-25 PROCEDURE — 83970 ASSAY OF PARATHORMONE: CPT

## 2023-04-25 PROCEDURE — 82306 VITAMIN D 25 HYDROXY: CPT

## 2023-04-25 PROCEDURE — 80069 RENAL FUNCTION PANEL: CPT

## 2023-04-26 RX ORDER — POTASSIUM CHLORIDE 750 MG/1
TABLET, EXTENDED RELEASE ORAL
Qty: 270 TABLET | Refills: 3 | Status: SHIPPED | OUTPATIENT
Start: 2023-04-26 | End: 2024-02-19

## 2023-05-01 ENCOUNTER — OFFICE VISIT (OUTPATIENT)
Dept: NEPHROLOGY | Facility: CLINIC | Age: 88
End: 2023-05-01
Payer: COMMERCIAL

## 2023-05-01 VITALS — WEIGHT: 160 LBS | BODY MASS INDEX: 25.44 KG/M2 | SYSTOLIC BLOOD PRESSURE: 156 MMHG | DIASTOLIC BLOOD PRESSURE: 76 MMHG

## 2023-05-01 DIAGNOSIS — D69.6 THROMBOCYTOPENIA (H): Primary | ICD-10-CM

## 2023-05-01 DIAGNOSIS — I10 ESSENTIAL HYPERTENSION: ICD-10-CM

## 2023-05-01 DIAGNOSIS — N18.4 CHRONIC KIDNEY DISEASE, STAGE IV (SEVERE) (H): ICD-10-CM

## 2023-05-01 PROCEDURE — 99214 OFFICE O/P EST MOD 30 MIN: CPT | Performed by: INTERNAL MEDICINE

## 2023-05-01 RX ORDER — FUROSEMIDE 40 MG
40 TABLET ORAL 2 TIMES DAILY
Qty: 180 TABLET | Refills: 3 | Status: SHIPPED | OUTPATIENT
Start: 2023-05-01 | End: 2023-06-26

## 2023-05-01 NOTE — PROGRESS NOTES
Assessment and Plan:   87 year old female with history of long standing HTN, CAD s/p multiple stents, who presents for followup of CKD stage 3, baseline SCr 1.8-2.2 mg/dL without proteinuria. She had another angiogram with stent done June 2022.    1. CKD stage 3- baseline SCr 1.8-2.2mg/ dL, eGFR 25-29 ml/min. Mild/ minimal proteinuria now up to > 1gram, due to ? Uncontrolled BP    - had angiogram in June 2022, no labs since, will do renal panel today  Her swelling previously occasional and she would ttake an extra furosemide but typically only takes furosemide 40mg once a day however now has more swelling since being on amlodipine 10mg for better BP control   - takes furosemide second dose if she has more swelling, not often.  Her proteinuria is increased thus suspect BP is not ideally controlled.  - monitor labs, relatively stable at this time, except proteinuria  - consider ACE inhibitor , cannot see that she has been on this, given proteinuria and CKD     2. Electrolytes/Acid Base status- normal.    Hypokalemia- takes 3 potassium pills daily      3. Hypertension/Volume status-  She is up a little and swelling is also worse. She is on metoprolol 100 mg BID, furosemide 40 mg once a day with an extra pill prn,.   - on amlodipine 10mg she has more swelling- monitor  -patient reports history of reaction/iontolerance to Spironolactone, Chlorthalidone, Clonidine and Terazosin in the past   - takes prn furosemide in the afternoon, and BP is close to goal.     4. Anemia- mild, hgb at 11,  Iron studies were not recently checked  - low platelet count- down to 60s- add on to hgb today, refer to hematology if still low- no clear reason    5. BMD  - last serum calcium and phosphorus were normal  -secondary hyperparathyroidism-PTH was 133.     Assessment and plan was discussed with patient and she voiced her understanding and agreement.        Reason for Visit:  Tessa Mansfield is an 87 year old female with HTN, who presents  for CKD management.     HPI:  She is a pleasant female with PMMHx significant for stage III CKD presumed secondary to hypertensive nephrosclerosis.Her renal function has been  relatively stable, ranging from 1.8-2.2 mg/dL over the years. She has history of CAD s/p multiple stents  (8 stents in all) since 2004 and a pacemaker in 2007. She follows with cardiologist Dr Santoyo and Dr Zhu.  Since her last visit she has been ok. She was having more dyspnea on exertion and had an angiogram and stent placed in June 2022. Her creatinine was fairly stable at thie time.    She is moderating salt intake. She has not been checking her BP    She did take furosemide twice a day for a while , and now takes it mostly once a day, prn in PM if she has more swelling  We discussed her low platelet count. She denies any bleeding, liver issues. It was not checked, will order for next lab visit.  She feels well overall, good energy and no new medical issues in recent months.    Home BP: 130 range    Baseline Cr: 1.8-2.2    ROS:  A comprehensive review of systems was obtained and negative, except as noted in the HPI or PMH.    Active Medical Problems:  Patient Active Problem List   Diagnosis     Degeneration of cervical intervertebral disc     Nonallopathic lesion of cervical region     Nonallopathic lesion of thoracic region     Coronary artery disease of native artery of native heart with stable angina pectoris (H)     Dizziness and giddiness     Edema     Esophageal reflux     Gout     Essential hypertension     Obstructive sleep apnea     Osteomalacia     Post-menopausal osteoporosis     Cardiac Pacemaker- Medtronic, dual chamber- NOT dependant     Transient complete heart block (H)     Sinus node dysfunction (H)     Disturbance of skin sensation     NSTEMI (non-ST elevated myocardial infarction) (H)     Arrhythmia     Sick sinus syndrome (H)     Non-rheumatic mitral regurgitation     Non-rheumatic tricuspid valve insufficiency      CKD (chronic kidney disease) stage 4, GFR 15-29 ml/min (H)     Status post coronary angiogram     Secondary renal hyperparathyroidism (H)     Thrombocytopenia (H)       Personal Hx:   Social History     Socioeconomic History     Marital status:      Spouse name: Not on file     Number of children: 4     Years of education: Not on file     Highest education level: Not on file   Occupational History     Employer: [employer]   Tobacco Use     Smoking status: Former     Packs/day: 0.30     Years: 15.00     Pack years: 4.50     Types: Cigarettes     Smokeless tobacco: Never     Tobacco comments:     Quit 22+ years ago   Vaping Use     Vaping status: Not on file   Substance and Sexual Activity     Alcohol use: Not on file     Drug use: No     Sexual activity: Not Currently     Partners: Male     Birth control/protection: Post-menopausal   Other Topics Concern      Service Not Asked     Blood Transfusions Yes     Caffeine Concern Not Asked     Occupational Exposure Not Asked     Hobby Hazards Not Asked     Sleep Concern Not Asked     Stress Concern Not Asked     Weight Concern Not Asked     Special Diet Not Asked     Back Care Not Asked     Exercise No     Bike Helmet Not Asked     Seat Belt Not Asked     Self-Exams Not Asked     Parent/sibling w/ CABG, MI or angioplasty before 65F 55M? Not Asked   Social History Narrative     Not on file     Social Determinants of Health     Financial Resource Strain: Not on file   Food Insecurity: Not on file   Transportation Needs: Not on file   Physical Activity: Not on file   Stress: Not on file   Social Connections: Not on file   Intimate Partner Violence: Not on file   Housing Stability: Not on file       Allergies:  Allergies   Allergen Reactions     Bactrim [Sulfamethoxazole W/Trimethoprim] Other (See Comments)     Patient unable to recall     Biaxin [Clarithromycin]      Chlorthalidone Nausea and Vomiting     Clonidine      Codeine Sulfate Itching  "    Darvon [Propoxyphene Hcl]      Dilaudid [Hydromorphone] Visual Disturbance     Hallucinations       Gabapentin Other (See Comments) and Fatigue     \"felt drunk\"     Indomethacin      Levaquin [Levofloxacin Hemihydrate]      Morphine Sulfate      Percocet [Oxycodone-Acetaminophen] Other (See Comments)     hallucinations     Pregabalin Fatigue     Pregabalin Itching     Other reaction(s): Fatigue     Shrimp Swelling     Spironolactone Other (See Comments)     Dehydrated       Terazosin      Ciprofloxacin Rash     Simvastatin Palpitations     Muscle weakness, leg cramping         Current Outpatient Medications   Medication     allopurinol (ZYLOPRIM) 100 MG tablet     amLODIPine (NORVASC) 10 MG tablet     aspirin 81 MG tablet     budesonide (PULMICORT) 0.5 MG/2ML neb solution     clopidogrel (PLAVIX) 75 MG tablet     cyanocolbalamin (VITAMIN B-12) 1000 MCG tablet     fluticasone-salmeterol (ADVAIR) 250-50 MCG/ACT inhaler     furosemide (LASIX) 40 MG tablet     isosorbide mononitrate (ISMO/MONOKET) 20 MG tablet     metoprolol tartrate (LOPRESSOR) 100 MG tablet     Multiple Vitamins-Minerals (PRESERVISION AREDS 2+MULTI VIT PO)     omeprazole (PRILOSEC) 40 MG DR capsule     potassium chloride ER (K-TAB/KLOR-CON) 10 MEQ CR tablet     pravastatin (PRAVACHOL) 80 MG tablet     timolol maleate (TIMOPTIC) 0.5 % ophthalmic solution     VITAMIN D3 25 MCG (1000 UT) tablet     No current facility-administered medications for this visit.       Vitals:  BP (!) 174/76   Wt 72.6 kg (160 lb)   BMI 25.44 kg/m      Exam:  General: awake and alert, appears to be in no acute distress  Neuro: normal speech  Skin:nromal, some bruising  Lungs: clear bilaterally  Extremities:+ edema      Results:  Last Comprehensive Metabolic Panel:  Sodium   Date Value Ref Range Status   04/25/2023 145 136 - 145 mmol/L Final   08/26/2020 141 133 - 144 mmol/L Final     Potassium   Date Value Ref Range Status   04/25/2023 4.0 3.4 - 5.3 mmol/L Final "   11/21/2022 4.8 3.4 - 5.3 mmol/L Final   08/26/2020 4.4 3.4 - 5.3 mmol/L Final     Chloride   Date Value Ref Range Status   04/25/2023 110 (H) 98 - 107 mmol/L Final   11/21/2022 110 (H) 94 - 109 mmol/L Final   08/26/2020 111 (H) 94 - 109 mmol/L Final     Carbon Dioxide   Date Value Ref Range Status   08/26/2020 24 20 - 32 mmol/L Final     Carbon Dioxide (CO2)   Date Value Ref Range Status   04/25/2023 23 22 - 29 mmol/L Final   11/21/2022 24 20 - 32 mmol/L Final     Anion Gap   Date Value Ref Range Status   04/25/2023 12 7 - 15 mmol/L Final   11/21/2022 6 3 - 14 mmol/L Final   08/26/2020 8 3 - 14 mmol/L Final     Glucose   Date Value Ref Range Status   04/25/2023 94 70 - 99 mg/dL Final   11/21/2022 79 70 - 99 mg/dL Final   08/26/2020 84 70 - 99 mg/dL Final     Urea Nitrogen   Date Value Ref Range Status   04/25/2023 25.9 (H) 8.0 - 23.0 mg/dL Final   11/21/2022 35 (H) 7 - 30 mg/dL Final   08/26/2020 41 (H) 7 - 30 mg/dL Final     Creatinine   Date Value Ref Range Status   04/25/2023 1.70 (H) 0.51 - 0.95 mg/dL Final   08/26/2020 1.70 (H) 0.52 - 1.04 mg/dL Final     GFR Estimate   Date Value Ref Range Status   04/25/2023 29 (L) >60 mL/min/1.73m2 Final     Comment:     eGFR calculated using 2021 CKD-EPI equation.   08/26/2020 27 (L) >60 mL/min/[1.73_m2] Final     Comment:     Non  GFR Calc  Starting 12/18/2018, serum creatinine based estimated GFR (eGFR) will be   calculated using the Chronic Kidney Disease Epidemiology Collaboration   (CKD-EPI) equation.       Calcium   Date Value Ref Range Status   04/25/2023 9.2 8.8 - 10.2 mg/dL Final   08/26/2020 9.0 8.5 - 10.1 mg/dL Final       Last Basic Metabolic Panel:  Lab Results   Component Value Date     11/08/2017      Lab Results   Component Value Date    POTASSIUM 3.5 11/08/2017     Lab Results   Component Value Date    CHLORIDE 104 11/08/2017     Lab Results   Component Value Date    ALEXANDRO 8.8 11/08/2017     Lab Results   Component Value Date    CO2 26  11/08/2017     Lab Results   Component Value Date    BUN 54 11/08/2017     Lab Results   Component Value Date    CR 2.36 11/08/2017     Lab Results   Component Value Date    GLC 88 11/08/2017       Marzena Martin MD   of Medicine  Department of Nephrology  [facility]

## 2023-05-01 NOTE — LETTER
5/1/2023       RE: Tessa Mansfield  [address]  [facility] [address]     Dear Colleague,    Thank you for referring your patient, Tessa Mansfield, to the [facility] at [facility]. Please see a copy of my visit note below.    Assessment and Plan:   87 year old female with history of long standing HTN, CAD s/p multiple stents, who presents for followup of CKD stage 3, baseline SCr 1.8-2.2 mg/dL without proteinuria. She had another angiogram with stent done June 2022.    1. CKD stage 3- baseline SCr 1.8-2.2mg/ dL, eGFR 25-29 ml/min. Mild/ minimal proteinuria now up to > 1gram, due to ? Uncontrolled BP    - had angiogram in June 2022, no labs since, will do renal panel today  Her swelling previously occasional and she would ttake an extra furosemide but typically only takes furosemide 40mg once a day however now has more swelling since being on amlodipine 10mg for better BP control   - takes furosemide second dose if she has more swelling, not often.  Her proteinuria is increased thus suspect BP is not ideally controlled.  - monitor labs, relatively stable at this time, except proteinuria  - consider ACE inhibitor , cannot see that she has been on this, given proteinuria and CKD     2. Electrolytes/Acid Base status- normal.    Hypokalemia- takes 3 potassium pills daily      3. Hypertension/Volume status-  She is up a little and swelling is also worse. She is on metoprolol 100 mg BID, furosemide 40 mg once a day with an extra pill prn,.   - on amlodipine 10mg she has more swelling- monitor  -patient reports history of reaction/iontolerance to Spironolactone, Chlorthalidone, Clonidine and Terazosin in the past   - takes prn furosemide in the afternoon, and BP is close to goal.     4. Anemia- mild, hgb at 11,  Iron studies were not recently checked  - low platelet count- down to 60s- add on to hgb today, refer to hematology if still low- no  clear reason    5. BMD  - last serum calcium and phosphorus were normal  -secondary hyperparathyroidism-PTH was 133.     Assessment and plan was discussed with patient and she voiced her understanding and agreement.        Reason for Visit:  Tessa Mansfield is an 87 year old female with HTN, who presents for CKD management.     HPI:  She is a pleasant female with PMMHx significant for stage III CKD presumed secondary to hypertensive nephrosclerosis.Her renal function has been  relatively stable, ranging from 1.8-2.2 mg/dL over the years. She has history of CAD s/p multiple stents  (8 stents in all) since 2004 and a pacemaker in 2007. She follows with cardiologist Dr Santoyo and Dr Zhu.  Since her last visit she has been ok. She was having more dyspnea on exertion and had an angiogram and stent placed in June 2022. Her creatinine was fairly stable at thie time.    She is moderating salt intake. She has not been checking her BP    She did take furosemide twice a day for a while , and now takes it mostly once a day, prn in PM if she has more swelling  We discussed her low platelet count. She denies any bleeding, liver issues. It was not checked, will order for next lab visit.  She feels well overall, good energy and no new medical issues in recent months.    Home BP: 130 range    Baseline Cr: 1.8-2.2    ROS:  A comprehensive review of systems was obtained and negative, except as noted in the HPI or PMH.    Active Medical Problems:  Patient Active Problem List   Diagnosis    Degeneration of cervical intervertebral disc    Nonallopathic lesion of cervical region    Nonallopathic lesion of thoracic region    Coronary artery disease of native artery of native heart with stable angina pectoris (H)    Dizziness and giddiness    Edema    Esophageal reflux    Gout    Essential hypertension    Obstructive sleep apnea    Osteomalacia    Post-menopausal osteoporosis    Cardiac Pacemaker- Medtronic, dual chamber- NOT  dependant    Transient complete heart block (H)    Sinus node dysfunction (H)    Disturbance of skin sensation    NSTEMI (non-ST elevated myocardial infarction) (H)    Arrhythmia    Sick sinus syndrome (H)    Non-rheumatic mitral regurgitation    Non-rheumatic tricuspid valve insufficiency    CKD (chronic kidney disease) stage 4, GFR 15-29 ml/min (H)    Status post coronary angiogram    Secondary renal hyperparathyroidism (H)    Thrombocytopenia (H)       Personal Hx:   Social History     Socioeconomic History    Marital status:      Spouse name: Not on file    Number of children: 4    Years of education: Not on file    Highest education level: Not on file   Occupational History     Employer: [employer]   Tobacco Use    Smoking status: Former     Packs/day: 0.30     Years: 15.00     Pack years: 4.50     Types: Cigarettes    Smokeless tobacco: Never    Tobacco comments:     Quit 22+ years ago   Vaping Use    Vaping status: Not on file   Substance and Sexual Activity    Alcohol use: Not on file    Drug use: No    Sexual activity: Not Currently     Partners: Male     Birth control/protection: Post-menopausal   Other Topics Concern     Service Not Asked    Blood Transfusions Yes    Caffeine Concern Not Asked    Occupational Exposure Not Asked    Hobby Hazards Not Asked    Sleep Concern Not Asked    Stress Concern Not Asked    Weight Concern Not Asked    Special Diet Not Asked    Back Care Not Asked    Exercise No    Bike Helmet Not Asked    Seat Belt Not Asked    Self-Exams Not Asked    Parent/sibling w/ CABG, MI or angioplasty before 65F 55M? Not Asked   Social History Narrative    Not on file     Social Determinants of Health     Financial Resource Strain: Not on file   Food Insecurity: Not on file   Transportation Needs: Not on file   Physical Activity: Not on file   Stress: Not on file   Social Connections: Not on file   Intimate Partner Violence: Not on file   Housing Stability: Not on  "file       Allergies:  Allergies   Allergen Reactions    Bactrim [Sulfamethoxazole W/Trimethoprim] Other (See Comments)     Patient unable to recall    Biaxin [Clarithromycin]     Chlorthalidone Nausea and Vomiting    Clonidine     Codeine Sulfate Itching    Darvon [Propoxyphene Hcl]     Dilaudid [Hydromorphone] Visual Disturbance     Hallucinations      Gabapentin Other (See Comments) and Fatigue     \"felt drunk\"    Indomethacin     Levaquin [Levofloxacin Hemihydrate]     Morphine Sulfate     Percocet [Oxycodone-Acetaminophen] Other (See Comments)     hallucinations    Pregabalin Fatigue    Pregabalin Itching     Other reaction(s): Fatigue    Shrimp Swelling    Spironolactone Other (See Comments)     Dehydrated      Terazosin     Ciprofloxacin Rash    Simvastatin Palpitations     Muscle weakness, leg cramping         Current Outpatient Medications   Medication    allopurinol (ZYLOPRIM) 100 MG tablet    amLODIPine (NORVASC) 10 MG tablet    aspirin 81 MG tablet    budesonide (PULMICORT) 0.5 MG/2ML neb solution    clopidogrel (PLAVIX) 75 MG tablet    cyanocolbalamin (VITAMIN B-12) 1000 MCG tablet    fluticasone-salmeterol (ADVAIR) 250-50 MCG/ACT inhaler    furosemide (LASIX) 40 MG tablet    isosorbide mononitrate (ISMO/MONOKET) 20 MG tablet    metoprolol tartrate (LOPRESSOR) 100 MG tablet    Multiple Vitamins-Minerals (PRESERVISION AREDS 2+MULTI VIT PO)    omeprazole (PRILOSEC) 40 MG DR capsule    potassium chloride ER (K-TAB/KLOR-CON) 10 MEQ CR tablet    pravastatin (PRAVACHOL) 80 MG tablet    timolol maleate (TIMOPTIC) 0.5 % ophthalmic solution    VITAMIN D3 25 MCG (1000 UT) tablet     No current facility-administered medications for this visit.       Vitals:  BP (!) 174/76   Wt 72.6 kg (160 lb)   BMI 25.44 kg/m      Exam:  General: awake and alert, appears to be in no acute distress  Neuro: normal speech  Skin:nromal, some bruising  Lungs: clear bilaterally  Extremities:+ edema      Results:  Last Comprehensive " Metabolic Panel:  Sodium   Date Value Ref Range Status   04/25/2023 145 136 - 145 mmol/L Final   08/26/2020 141 133 - 144 mmol/L Final     Potassium   Date Value Ref Range Status   04/25/2023 4.0 3.4 - 5.3 mmol/L Final   11/21/2022 4.8 3.4 - 5.3 mmol/L Final   08/26/2020 4.4 3.4 - 5.3 mmol/L Final     Chloride   Date Value Ref Range Status   04/25/2023 110 (H) 98 - 107 mmol/L Final   11/21/2022 110 (H) 94 - 109 mmol/L Final   08/26/2020 111 (H) 94 - 109 mmol/L Final     Carbon Dioxide   Date Value Ref Range Status   08/26/2020 24 20 - 32 mmol/L Final     Carbon Dioxide (CO2)   Date Value Ref Range Status   04/25/2023 23 22 - 29 mmol/L Final   11/21/2022 24 20 - 32 mmol/L Final     Anion Gap   Date Value Ref Range Status   04/25/2023 12 7 - 15 mmol/L Final   11/21/2022 6 3 - 14 mmol/L Final   08/26/2020 8 3 - 14 mmol/L Final     Glucose   Date Value Ref Range Status   04/25/2023 94 70 - 99 mg/dL Final   11/21/2022 79 70 - 99 mg/dL Final   08/26/2020 84 70 - 99 mg/dL Final     Urea Nitrogen   Date Value Ref Range Status   04/25/2023 25.9 (H) 8.0 - 23.0 mg/dL Final   11/21/2022 35 (H) 7 - 30 mg/dL Final   08/26/2020 41 (H) 7 - 30 mg/dL Final     Creatinine   Date Value Ref Range Status   04/25/2023 1.70 (H) 0.51 - 0.95 mg/dL Final   08/26/2020 1.70 (H) 0.52 - 1.04 mg/dL Final     GFR Estimate   Date Value Ref Range Status   04/25/2023 29 (L) >60 mL/min/1.73m2 Final     Comment:     eGFR calculated using 2021 CKD-EPI equation.   08/26/2020 27 (L) >60 mL/min/[1.73_m2] Final     Comment:     Non  GFR Calc  Starting 12/18/2018, serum creatinine based estimated GFR (eGFR) will be   calculated using the Chronic Kidney Disease Epidemiology Collaboration   (CKD-EPI) equation.       Calcium   Date Value Ref Range Status   04/25/2023 9.2 8.8 - 10.2 mg/dL Final   08/26/2020 9.0 8.5 - 10.1 mg/dL Final       Last Basic Metabolic Panel:  Lab Results   Component Value Date     11/08/2017      Lab Results    Component Value Date    POTASSIUM 3.5 11/08/2017     Lab Results   Component Value Date    CHLORIDE 104 11/08/2017     Lab Results   Component Value Date    ALEXANDRO 8.8 11/08/2017     Lab Results   Component Value Date    CO2 26 11/08/2017     Lab Results   Component Value Date    BUN 54 11/08/2017     Lab Results   Component Value Date    CR 2.36 11/08/2017     Lab Results   Component Value Date    GLC 88 11/08/2017       Marzena Martin MD   of Medicine  Department of Nephrology  [facility]

## 2023-05-16 NOTE — TELEPHONE ENCOUNTER
Called and spoke with patient. She is taking the recommended Amlodipine 10 MG daily. Prescription sent to pharmacy. Unable to see documentation for decreasing Imdur. Message to sent to provider for clarification on dose. Will send in prescription for Imdur and notify patient with provider's reply.  
[facility] Call Center    Phone Message    May a detailed message be left on voicemail: yes     Reason for Call: Medication Question or concern regarding medication   Prescription Clarification  Name of Medication: isosorbide mononitrate (ISMO/MONOKET) 20 MG tablet  Prescribing Provider: Natanael   Pharmacy: [facility] [address] [place] [address] [id number] [facility]   What on the order needs clarification? Please clarify dose; pt & pharmacy are concerned, pt usually takes 3 tabs 2 twice daily.   You wrote for Take 3 tablets (60 mg) by mouth daily     Other: Pharmacy stated they did not receive the amLODIPine (NORVASC) 2.5 MG tablet Prescription sent on 5/17/22       Action Taken: Other: Cardio    Travel Screening: Not Applicable                                                                        
133

## 2023-05-30 ENCOUNTER — ANCILLARY PROCEDURE (OUTPATIENT)
Dept: CARDIOLOGY | Facility: CLINIC | Age: 88
End: 2023-05-30
Attending: INTERNAL MEDICINE
Payer: COMMERCIAL

## 2023-05-30 DIAGNOSIS — I49.5 SICK SINUS SYNDROME (H): ICD-10-CM

## 2023-05-30 PROCEDURE — 93294 REM INTERROG EVL PM/LDLS PM: CPT | Performed by: INTERNAL MEDICINE

## 2023-05-30 PROCEDURE — 93296 REM INTERROG EVL PM/IDS: CPT

## 2023-06-03 LAB
MDC_IDC_EPISODE_DTM: NORMAL
MDC_IDC_EPISODE_DTM: NORMAL
MDC_IDC_EPISODE_DURATION: 13 S
MDC_IDC_EPISODE_ID: 2
MDC_IDC_EPISODE_ID: 3
MDC_IDC_EPISODE_TYPE: NORMAL
MDC_IDC_EPISODE_TYPE: NORMAL
MDC_IDC_LEAD_IMPLANT_DT: NORMAL
MDC_IDC_LEAD_IMPLANT_DT: NORMAL
MDC_IDC_LEAD_LOCATION: NORMAL
MDC_IDC_LEAD_LOCATION: NORMAL
MDC_IDC_LEAD_LOCATION_DETAIL_1: NORMAL
MDC_IDC_LEAD_LOCATION_DETAIL_1: NORMAL
MDC_IDC_LEAD_MFG: NORMAL
MDC_IDC_LEAD_MFG: NORMAL
MDC_IDC_LEAD_MODEL: NORMAL
MDC_IDC_LEAD_MODEL: NORMAL
MDC_IDC_LEAD_POLARITY_TYPE: NORMAL
MDC_IDC_LEAD_POLARITY_TYPE: NORMAL
MDC_IDC_LEAD_SERIAL: NORMAL
MDC_IDC_LEAD_SERIAL: NORMAL
MDC_IDC_LEAD_SPECIAL_FUNCTION: NORMAL
MDC_IDC_LEAD_SPECIAL_FUNCTION: NORMAL
MDC_IDC_MSMT_BATTERY_DTM: NORMAL
MDC_IDC_MSMT_BATTERY_REMAINING_LONGEVITY: 121 MO
MDC_IDC_MSMT_BATTERY_RRT_TRIGGER: 2.62
MDC_IDC_MSMT_BATTERY_STATUS: NORMAL
MDC_IDC_MSMT_BATTERY_VOLTAGE: 3.01 V
MDC_IDC_MSMT_LEADCHNL_RA_IMPEDANCE_VALUE: 342 OHM
MDC_IDC_MSMT_LEADCHNL_RA_IMPEDANCE_VALUE: 399 OHM
MDC_IDC_MSMT_LEADCHNL_RA_PACING_THRESHOLD_AMPLITUDE: 0.5 V
MDC_IDC_MSMT_LEADCHNL_RA_PACING_THRESHOLD_PULSEWIDTH: 0.4 MS
MDC_IDC_MSMT_LEADCHNL_RA_SENSING_INTR_AMPL: 0.88 MV
MDC_IDC_MSMT_LEADCHNL_RA_SENSING_INTR_AMPL: 0.88 MV
MDC_IDC_MSMT_LEADCHNL_RV_IMPEDANCE_VALUE: 437 OHM
MDC_IDC_MSMT_LEADCHNL_RV_IMPEDANCE_VALUE: 494 OHM
MDC_IDC_MSMT_LEADCHNL_RV_PACING_THRESHOLD_AMPLITUDE: 1 V
MDC_IDC_MSMT_LEADCHNL_RV_PACING_THRESHOLD_PULSEWIDTH: 0.4 MS
MDC_IDC_MSMT_LEADCHNL_RV_SENSING_INTR_AMPL: 19.12 MV
MDC_IDC_MSMT_LEADCHNL_RV_SENSING_INTR_AMPL: 19.12 MV
MDC_IDC_PG_IMPLANT_DTM: NORMAL
MDC_IDC_PG_MFG: NORMAL
MDC_IDC_PG_MODEL: NORMAL
MDC_IDC_PG_SERIAL: NORMAL
MDC_IDC_PG_TYPE: NORMAL
MDC_IDC_SESS_CLINIC_NAME: NORMAL
MDC_IDC_SESS_DTM: NORMAL
MDC_IDC_SESS_TYPE: NORMAL
MDC_IDC_SET_BRADY_AT_MODE_SWITCH_RATE: 171 {BEATS}/MIN
MDC_IDC_SET_BRADY_HYSTRATE: NORMAL
MDC_IDC_SET_BRADY_LOWRATE: 60 {BEATS}/MIN
MDC_IDC_SET_BRADY_MAX_SENSOR_RATE: 130 {BEATS}/MIN
MDC_IDC_SET_BRADY_MAX_TRACKING_RATE: 130 {BEATS}/MIN
MDC_IDC_SET_BRADY_MODE: NORMAL
MDC_IDC_SET_BRADY_PAV_DELAY_LOW: 180 MS
MDC_IDC_SET_BRADY_SAV_DELAY_LOW: 150 MS
MDC_IDC_SET_LEADCHNL_RA_PACING_AMPLITUDE: 1.5 V
MDC_IDC_SET_LEADCHNL_RA_PACING_ANODE_ELECTRODE_1: NORMAL
MDC_IDC_SET_LEADCHNL_RA_PACING_ANODE_LOCATION_1: NORMAL
MDC_IDC_SET_LEADCHNL_RA_PACING_CAPTURE_MODE: NORMAL
MDC_IDC_SET_LEADCHNL_RA_PACING_CATHODE_ELECTRODE_1: NORMAL
MDC_IDC_SET_LEADCHNL_RA_PACING_CATHODE_LOCATION_1: NORMAL
MDC_IDC_SET_LEADCHNL_RA_PACING_POLARITY: NORMAL
MDC_IDC_SET_LEADCHNL_RA_PACING_PULSEWIDTH: 0.4 MS
MDC_IDC_SET_LEADCHNL_RA_SENSING_ANODE_ELECTRODE_1: NORMAL
MDC_IDC_SET_LEADCHNL_RA_SENSING_ANODE_LOCATION_1: NORMAL
MDC_IDC_SET_LEADCHNL_RA_SENSING_CATHODE_ELECTRODE_1: NORMAL
MDC_IDC_SET_LEADCHNL_RA_SENSING_CATHODE_LOCATION_1: NORMAL
MDC_IDC_SET_LEADCHNL_RA_SENSING_POLARITY: NORMAL
MDC_IDC_SET_LEADCHNL_RA_SENSING_SENSITIVITY: 0.3 MV
MDC_IDC_SET_LEADCHNL_RV_PACING_AMPLITUDE: 2 V
MDC_IDC_SET_LEADCHNL_RV_PACING_ANODE_ELECTRODE_1: NORMAL
MDC_IDC_SET_LEADCHNL_RV_PACING_ANODE_LOCATION_1: NORMAL
MDC_IDC_SET_LEADCHNL_RV_PACING_CAPTURE_MODE: NORMAL
MDC_IDC_SET_LEADCHNL_RV_PACING_CATHODE_ELECTRODE_1: NORMAL
MDC_IDC_SET_LEADCHNL_RV_PACING_CATHODE_LOCATION_1: NORMAL
MDC_IDC_SET_LEADCHNL_RV_PACING_POLARITY: NORMAL
MDC_IDC_SET_LEADCHNL_RV_PACING_PULSEWIDTH: 0.4 MS
MDC_IDC_SET_LEADCHNL_RV_SENSING_ANODE_ELECTRODE_1: NORMAL
MDC_IDC_SET_LEADCHNL_RV_SENSING_ANODE_LOCATION_1: NORMAL
MDC_IDC_SET_LEADCHNL_RV_SENSING_CATHODE_ELECTRODE_1: NORMAL
MDC_IDC_SET_LEADCHNL_RV_SENSING_CATHODE_LOCATION_1: NORMAL
MDC_IDC_SET_LEADCHNL_RV_SENSING_POLARITY: NORMAL
MDC_IDC_SET_LEADCHNL_RV_SENSING_SENSITIVITY: 0.9 MV
MDC_IDC_SET_ZONE_DETECTION_INTERVAL: 350 MS
MDC_IDC_SET_ZONE_DETECTION_INTERVAL: 400 MS
MDC_IDC_SET_ZONE_TYPE: NORMAL
MDC_IDC_STAT_AT_BURDEN_PERCENT: 0.4 %
MDC_IDC_STAT_AT_DTM_END: NORMAL
MDC_IDC_STAT_AT_DTM_START: NORMAL
MDC_IDC_STAT_BRADY_AP_VP_PERCENT: 0.06 %
MDC_IDC_STAT_BRADY_AP_VS_PERCENT: 73.23 %
MDC_IDC_STAT_BRADY_AS_VP_PERCENT: 0.03 %
MDC_IDC_STAT_BRADY_AS_VS_PERCENT: 26.68 %
MDC_IDC_STAT_BRADY_DTM_END: NORMAL
MDC_IDC_STAT_BRADY_DTM_START: NORMAL
MDC_IDC_STAT_BRADY_RA_PERCENT_PACED: 73.53 %
MDC_IDC_STAT_BRADY_RV_PERCENT_PACED: 0.1 %
MDC_IDC_STAT_EPISODE_RECENT_COUNT: 0
MDC_IDC_STAT_EPISODE_RECENT_COUNT: 2
MDC_IDC_STAT_EPISODE_RECENT_COUNT_DTM_END: NORMAL
MDC_IDC_STAT_EPISODE_RECENT_COUNT_DTM_START: NORMAL
MDC_IDC_STAT_EPISODE_TOTAL_COUNT: 0
MDC_IDC_STAT_EPISODE_TOTAL_COUNT: 3
MDC_IDC_STAT_EPISODE_TOTAL_COUNT_DTM_END: NORMAL
MDC_IDC_STAT_EPISODE_TOTAL_COUNT_DTM_START: NORMAL
MDC_IDC_STAT_EPISODE_TYPE: NORMAL

## 2023-06-14 NOTE — PROGRESS NOTES
Chief complaint: New atrial fibrillation     HPI:   Tessa Mansfield is a 88 year old female patient of Dr. Santoyo with extensive CAD history as detailed below (PCI to all 3 vessels at various times from 6950-0239, last PCA to RPDA 7/23/13 and known 80% ISR of small LCX) on indefinite dual antiplatelet therapy and sick sinus syndrome s/p dual-chamber PPM who presents for follow-up of new onset device detected atrial fibrillation.      TTE 10/15/19 (my read):   Normal LV/RV function, LVEF=60-65%.   At least moderate MR and moderate-to-severe TR.  RVSP~39+RAP~15 mmHg.  Compared to 7/24/13: MR and TR have worsened.     12/10/19:  She underwent pacemaker generator change 10/15/19 due to her prior generator reaching LOLA (new device: Medtronic [id number] Beatrice XT DR DIA dual chamber PPM) without event.      She reports volume status has been about stable and that she has self-titrated furosemide as needed and has done so for some time.      5/11/21:  Since her last visit, Tessa reports feeling generally well. She does have occasional episodes of angina (pain between her shoulders) for which she takes 1 NTG every 2-3 months, which has been longstanding and stable.      She denies any dyspnea at rest or with exertion, PND, orthopnea, peripheral edema, palpitations, lightheadedness or syncope.      5/17/2022:  Patient states that she has had SOB for the last month. States that she is able to walk 1-2 blocks before developing SOB, prior to this she was able to walk about 4 blocks before becoming SOB.  She denies chest pain, chest pressure, lightheadedness or dizziness.     12/06/22:  Since her last visit, she underwent coronary angiogram and PCI+ALVAREZ to RCA with dramatic improvement in her exertional dyspnea. She completed cardiac rehab. She does think she could walk somewhat further than before but has not pushed her exercise capacity recently. She plans to start this in in the new year. She has not required NTG at all since  her last visit.     She denies any dyspnea at rest or with exertion, PND, orthopnea, peripheral edema, palpitations, lightheadedness or syncope.     June 2023:  Had COVID a few weeks ago with symptoms of fatigue and short of breath. Device interrogation showed > 9 hours of atrial fibrillation on 5/29/23 in the setting of COVID 19 infection. She was not aware that she was in atrial fibrillation - no palpitations, lightheadedness, syncope. Continues to feel fatigued post COVID, though denies significant shortness of breath reminiscent of her prior angina. No chest pain or pressure. No orthopnea, PND. She has chronic leg swelling which improves with lasix and elevation. BLE edema has been stable. Has gained 5 lbs over the past few months. BP is usually high in clinic - home BP slightly above goal 134/60-140s.     Summary of CAD history:  1. 10/27/2002: AMI s/p PCI+BMS (3.5x18mm Bx velocity) to mRCA  2. 2/5/2003: Back pain; PCI+stent to mLCX c/b distal embolization/slow flow  3. 4/11/2003: Unstable angina; PCI+stent (Hepacoat velocity) to mLAD  4. 11/27/2007: PCI+DESx2 to pLAD (IVUS w/ calcification of LMCA and ulcerated plaque pLAD, 80% dRCA; also had 80% dLCX, too small to stent)  5. 12/11/2007: Staged PCI. PCI+DESx1 (3.5x13mm Cypher) to dRCA (indefinite DAPT recommended at this time)  6. 6/27/2008: Angina. mLCX stent 70% ISR. LAD and RCA stents patent. Myocardium at risk from LCX felt to be small, medical management preferred to PCI.  7. 11/10/2009: Angina. Findings unchanged from 6/27/2008, FFR LAD 0.90. Medical management recommended.  8. 7/23/2013: Unstable angina; PCI+ALVAREZ to mPDA. Diagnostic findings: LMCA 40% distal. LAD: pLAD stents patent mLAD 30% ISR dLAD 50% diffuse, D2 diffuse disease. LCX 80% mid ISR. RCA diffuse <30% mid, RPLAs diffuse disease, RPDA 100% occlusion.   9. 5/27/2022: 90% dRCA in-stent -> PCI+DESx1 to dRCA.  50-60% pRCA (return for hemodynamic assessment if symptoms persist/recur) 50-60%  small LCx (medical management recommended)     PAST MEDICAL HISTORY:  Past Medical History:   Diagnosis Date     CAD (coronary artery disease)      CAD s/p PCI+BMS to MRCA 10/2002, PCI to mLCX 2/2003, PCI+DESx2 to pLAD 11/2007, PCI+DESx1 to dRCA 12/2007, PCI+ALVAREZ to RPDA 7/2013; on indefinite DAPT  10/27/2002    10/27/2002: AMI s/p PCI+BMS (3.5x18mm Bx velocity) to mRCA 2/5/2003: Back pain; PCI+stent to mLCX c/b distal embolization/slow flow 4/11/2003: Unstable angina; PCI+stent (Hepacoat velocity) to mLAD 11/27/2007: PCI+DESx2 to pLAD (IVUS w/ calcification of LMCA and ulcerated plaque pLAD, 80% dRCA; also had 80% dLCX, too small to stent) 12/11/2007: Staged PCI. PCI+DESx1 (3.5x13mm Cypher) to dRCA (indefinite DAPT recommended at this time) 6/27/2008: Angina. mLCX stent 70% ISR. LAD and RCA stents patent. Myocardium at risk from LCX felt to be small, medical management preferred to PCI. 11/10/2009: Angina. Findings unchanged from 6/27/2008, FFR LAD 0.90. Medical management recommended. 7/23/2013: Unstable angina; PCI+ALVAREZ to mPDA. Diagnostic findings: LMCA 40% distal. LAD: pLAD stents patent mLAD 30% ISR dLAD 50% diffuse, D2 diffuse disease. LCX 80% mid ISR. RCA diffuse <30% mid, RPLAs diffuse disease, RPDA 100% occlusion.       Cardiac Pacemaker- Medtronic, dual chamber- NOT dependant 11/28/2007     CKD (chronic kidney disease), stage IV (H)      Hyperlipidemia LDL goal <70      Hypertension      Stented coronary artery 10-    RCA     Stented coronary artery 2-5-2003    LCx     Stented coronary artery 4-    LAD     Stented coronary artery 11-    LAD     Stented coronary artery 12-    RCA     Transient complete heart block (H) 11/28/2007   New device detected atrial fibrillation > 9 hours 5/29/23    CURRENT MEDICATIONS:  Current Outpatient Medications   Medication     allopurinol (ZYLOPRIM) 100 MG tablet     amLODIPine (NORVASC) 10 MG tablet     aspirin 81 MG tablet     budesonide  "(PULMICORT) 0.5 MG/2ML neb solution     clopidogrel (PLAVIX) 75 MG tablet     cyanocolbalamin (VITAMIN B-12) 1000 MCG tablet     fluticasone-salmeterol (ADVAIR) 250-50 MCG/ACT inhaler     furosemide (LASIX) 40 MG tablet     isosorbide mononitrate (ISMO/MONOKET) 20 MG tablet     metoprolol tartrate (LOPRESSOR) 100 MG tablet     Multiple Vitamins-Minerals (PRESERVISION AREDS 2+MULTI VIT PO)     omeprazole (PRILOSEC) 40 MG DR capsule     potassium chloride ER (K-TAB/KLOR-CON) 10 MEQ CR tablet     pravastatin (PRAVACHOL) 80 MG tablet     timolol maleate (TIMOPTIC) 0.5 % ophthalmic solution     VITAMIN D3 25 MCG (1000 UT) tablet     No current facility-administered medications for this visit.     ALLERGIES:     Allergies   Allergen Reactions     Bactrim [Sulfamethoxazole W/Trimethoprim] Other (See Comments)     Patient unable to recall     Biaxin [Clarithromycin]      Chlorthalidone Nausea and Vomiting     Clonidine      Codeine Sulfate Itching     Darvon [Propoxyphene Hcl]      Dilaudid [Hydromorphone] Visual Disturbance     Hallucinations       Gabapentin Other (See Comments) and Fatigue     \"felt drunk\"     Indomethacin      Levaquin [Levofloxacin Hemihydrate]      Morphine Sulfate      Percocet [Oxycodone-Acetaminophen] Other (See Comments)     hallucinations     Pregabalin Fatigue     Pregabalin Itching     Other reaction(s): Fatigue     Shrimp Swelling     Spironolactone Other (See Comments)     Dehydrated       Terazosin      Ciprofloxacin Rash     Simvastatin Palpitations     Muscle weakness, leg cramping         FAMILY HISTORY:  Family History   Problem Relation Age of Onset     Cancer Sister 82        bladder cancer       SOCIAL HISTORY:  Social History     Tobacco Use     Smoking status: Former     Packs/day: 0.30     Years: 15.00     Pack years: 4.50     Types: Cigarettes     Smokeless tobacco: Never     Tobacco comments:     Quit 22+ years ago   Substance Use Topics     Drug use: No       ROS:   A " comprehensive 14 point review of systems is negative other than as mentioned in HPI.    Exam:  BP (!) 172/80 (BP Location: Right arm, Patient Position: Chair, Cuff Size: Adult Large)   Pulse 80   Wt 75.5 kg (166 lb 8 oz)   SpO2 99%   BMI 26.47 kg/m    GENERAL APPEARANCE: healthy, alert and no distress  EYES: no icterus, no xanthelasmas  ENT: normal palate, mucosa moist, no central cyanosis  NECK: No JVD   RESPIRATORY: lungs clear to auscultation - no rales, rhonchi or wheezes, no use of accessory muscles, no retractions, respirations are unlabored, normal respiratory rate  CARDIOVASCULAR: regular rhythm, II/VI systolic murmur LLSB and apex  GI: soft, non tender, bowel sounds normal,no abdominal bruits  EXTREMITIES: no edema, no bruits  NEURO: alert and oriented to person/place/time, normal speech, gait and affect  VASC: Radial, dorsalis pedis and posterior tibialis pulses 2+ bilaterally.  SKIN: no ecchymoses, no rashes.  PSYCH: cooperative, affect appropriate.     Labs:  Last Comprehensive Metabolic Panel:  Lab Results   Component Value Date     04/25/2023    POTASSIUM 4.0 04/25/2023    CHLORIDE 110 (H) 04/25/2023    CO2 23 04/25/2023    ANIONGAP 12 04/25/2023    GLC 94 04/25/2023    BUN 25.9 (H) 04/25/2023    CR 1.70 (H) 04/25/2023    GFRESTIMATED 29 (L) 04/25/2023    ALEXANDRO 9.2 04/25/2023       CBC RESULTS: Recent Labs   Lab Test 04/25/23  0850 11/21/22  1336   WBC  --  7.2   RBC  --  3.75*   HGB 11.0* 11.0*  11.0*   HCT  --  36.7   MCV  --  98   MCH  --  29.3   MCHC  --  30.6*   RDW  --  14.8   PLT  --  125*     Recent Labs   Lab Test 06/17/22  1044 08/26/20  0925   CHOL 139 153   HDL 50 52   LDL 60 71   TRIG 144 149     Testing/Procedures:  TTE 10/15/19:   Normal LV/RV function, LVEF=60-65%.   At least moderate MR and moderate-to-severe TR.  RVSP~39+RAP~15 mmHg.  Compared to 7/24/13: MR and TR have worsened.      Device check 05/11/21: Normal device function. 86.3% AP <1% . Intrinsic rhythm sinus  bradycardia at 48 bpm. No atrial or ventricular arrhythmias. Lead trends stable.    Device check 3/25/22: Normal device function. AP: 78% : <0.1% Presenting EGM: AP-VS @ 83 bpm. 2 seconds of AT/AF. <0.1% AF burden. No ventricular arrhythmias.     TTE 05/17/22: LVEF:60-65%, normal RV function, moderate MR, moderate TR, normal IVC       Dr. Santoyo personally visualized:  Coronary angiogram/RHC/PCI 5/27/22:  90% dRCA in-stent -> PCI+ALVAREZ   50-60% pRCA (return for hemodynamic assessment if symptoms persist/recur)  50-60% small LCx (medical management recommended    RA 13 RV 46/13 PA 46/16 (26) PCW 15  Jamila CO 4.76 CI 2.6  PVR by Jamila CO 2.3    Device interrogation 11/29/22: 71% AP. No AF. Normal device function.    Device interrogation 5/30/23     CareAlert received for  AT/AF Daily Dysart > Threshold & 9 hours in AT/AF Since Last Session.     Remote pacemaker transmission received and reviewed. Device transmission sent per MD orders.     Device: Medtronic [id number] [place] XT DR DIA  Normal Device Function  Mode: AAIR>DDDR  bpm  AP: 73.5%  : 0.1%  Presenting EGM: AF with vent rate ~ 100-110 bpm  Short V-V intervals: 0  Lead Trends Appear Stable: Yes  Estimated battery longevity to RRT = 10.1 years  Atrial Arrhythmia: 2 AT/AF episodes recorded - 13 sec - > 9 hours in duration. Episode remains in progress since 5/29/23 @ 2132.  AF [place]art: 0.4%  Anticoagulant: None - Patient reports she is taking Plavix and ASA.  Ventricular Arrhythmia: 0  Pt Notified of Transmission Results: Yes, via telephone. Patient reports she is not feeling well and has Covid. Patient reports that she has been sick since Sunday 5/28/23. Dr. Santoyo notified.     Plan: Device follow-up every 3 months.  GERALDO Gomez RN     Assessment and Plan:     1. Paroxysmal atrial fibrillation, new:  - [id number] of 5 (HTN, age, CAD, female) indicating high risk of stroke  - Start AC with Eliquis 2.5 mg BID (reduced dose due to CKD, age)  - Continue rate  control with metoprolol     2. CAD s/p multiple PCIs as above (last to RPDA 7/23/13 and now s/p PCI+DESx1 to dRCA 5/27/22) and known 50-60% pRCA ISR, now with significant improvement in stable angina:   Dyspnea dramatically improved post-PCI-- likely her anginal equivalent (near-normal PCW on RHC at the time of coronary angiogram/PCI supports this.) Currently denies significant exertional dyspnea or other chest pain.   - Discontinue Plavix therapy with starting Eliquis as above  - Continue lifelong ASA   - continue statin  - If she has recurrent symptoms, repeat coronary angiogram and FFR/iFR of 50-60% pRCA lesion would be worth pursuing    3. Moderate mitral regurgitation, stable by TTE 5/2022  4. Moderate-to-severe tricuspid regurgitation, stable by TTE 5/2022  - No current symptoms, will continue to follow clinically and with periodic TTE  - Continue lasix, Imdur and start hydral 10 mg TID for afterload reduction and BP management      5. Renal Hypertension, controlled  6. CKD stage 4  - Currently on amlodipine 10 mg and Imdur 120 mg daily  -BP above above goal, add hydralazine 10 mg TID to her current regimen  -followed by Odilia Martin and Scar     7. Sick sinus syndrome s/p dual chamber PPM:  -routine device clinic follow up    RTC: 6 months with echo, labs and device interrogation prior    JI Loyola, CNP  Structural Heart Nurse Practitioner  [facility], [place]  Clinic: [phone]  Pager: [phone]

## 2023-06-15 ENCOUNTER — OFFICE VISIT (OUTPATIENT)
Dept: CARDIOLOGY | Facility: CLINIC | Age: 88
End: 2023-06-15
Attending: NURSE PRACTITIONER
Payer: COMMERCIAL

## 2023-06-15 VITALS
BODY MASS INDEX: 26.47 KG/M2 | SYSTOLIC BLOOD PRESSURE: 172 MMHG | DIASTOLIC BLOOD PRESSURE: 80 MMHG | WEIGHT: 166.5 LBS | OXYGEN SATURATION: 99 % | HEART RATE: 80 BPM

## 2023-06-15 DIAGNOSIS — I25.10 CORONARY ARTERY DISEASE INVOLVING NATIVE CORONARY ARTERY OF NATIVE HEART WITHOUT ANGINA PECTORIS: ICD-10-CM

## 2023-06-15 DIAGNOSIS — I25.118 CORONARY ARTERY DISEASE OF NATIVE ARTERY OF NATIVE HEART WITH STABLE ANGINA PECTORIS (H): ICD-10-CM

## 2023-06-15 DIAGNOSIS — I34.0 NON-RHEUMATIC MITRAL REGURGITATION: ICD-10-CM

## 2023-06-15 DIAGNOSIS — I48.0 PAROXYSMAL ATRIAL FIBRILLATION (H): Primary | ICD-10-CM

## 2023-06-15 DIAGNOSIS — I12.9 RENAL HYPERTENSION: ICD-10-CM

## 2023-06-15 PROCEDURE — G0463 HOSPITAL OUTPT CLINIC VISIT: HCPCS | Performed by: NURSE PRACTITIONER

## 2023-06-15 PROCEDURE — 93010 ELECTROCARDIOGRAM REPORT: CPT | Performed by: INTERNAL MEDICINE

## 2023-06-15 PROCEDURE — 93005 ELECTROCARDIOGRAM TRACING: CPT

## 2023-06-15 PROCEDURE — 99214 OFFICE O/P EST MOD 30 MIN: CPT | Performed by: NURSE PRACTITIONER

## 2023-06-15 RX ORDER — HYDRALAZINE HYDROCHLORIDE 10 MG/1
10 TABLET, FILM COATED ORAL 3 TIMES DAILY
Qty: 270 TABLET | Refills: 3 | Status: SHIPPED | OUTPATIENT
Start: 2023-06-15 | End: 2023-06-28

## 2023-06-15 ASSESSMENT — PAIN SCALES - GENERAL: PAINLEVEL: NO PAIN (0)

## 2023-06-15 NOTE — PATIENT INSTRUCTIONS
You were seen today in the Cardiovascular Clinic at the [facility].    Cardiology provider you saw during your visit Randa Ann NP.     Start Eliquis 2.5 mg twice daily.   2.   Stop Plavix therapy.  3.   Start hydralazine 10 mg three times daily  4.   Start checking home blood pressure and log results.  5.   Return to clinic in 10/2023 with echo and device interrogation prior.   6.   Will try to convert July 10 device interrogation to remote and will schedule October device interrogation in person.     Questions and [phone].   First press #1 for the SMART and then press #3 for Medical Questions to reach the Cardiology triage nurse.     On Call Cardiologist for after hours or on weekends: [phone], press option #4 and ask to speak to the on-call Cardiologist.

## 2023-06-15 NOTE — NURSING NOTE
Chief Complaint   Patient presents with     Follow Up     Go over device readings     Vitals were taken and medications reconciled.    Hakeem Hines, EMT  8:58 AM

## 2023-06-15 NOTE — LETTER
6/15/2023      RE: Tessa Mansfield  [address]  [facility] [address]       Dear Colleague,    Thank you for the opportunity to participate in the care of your patient, Tessa Mansfield, at the [facility] [place] at [facility]. Please see a copy of my visit note below.    Chief complaint: New atrial fibrillation     HPI:   Tessa Mansfield is a 88 year old female patient of Dr. Santoyo with extensive CAD history as detailed below (PCI to all 3 vessels at various times from 4324-4456, last PCA to RPDA 7/23/13 and known 80% ISR of small LCX) on indefinite dual antiplatelet therapy and sick sinus syndrome s/p dual-chamber PPM who presents for follow-up of new onset device detected atrial fibrillation.      TTE 10/15/19 (my read):   Normal LV/RV function, LVEF=60-65%.   At least moderate MR and moderate-to-severe TR.  RVSP~39+RAP~15 mmHg.  Compared to 7/24/13: MR and TR have worsened.     12/10/19:  She underwent pacemaker generator change 10/15/19 due to her prior generator reaching LOLA (new device: Medtronic [id number] [place] XT DR DIA dual chamber PPM) without event.      She reports volume status has been about stable and that she has self-titrated furosemide as needed and has done so for some time.      5/11/21:  Since her last visit, Tessa reports feeling generally well. She does have occasional episodes of angina (pain between her shoulders) for which she takes 1 NTG every 2-3 months, which has been longstanding and stable.      She denies any dyspnea at rest or with exertion, PND, orthopnea, peripheral edema, palpitations, lightheadedness or syncope.      5/17/2022:  Patient states that she has had SOB for the last month. States that she is able to walk 1-2 blocks before developing SOB, prior to this she was able to walk about 4 blocks before becoming SOB.  She denies chest pain, chest pressure, lightheadedness or dizziness.      12/06/22:  Since her last visit, she underwent coronary angiogram and PCI+ALVAREZ to RCA with dramatic improvement in her exertional dyspnea. She completed cardiac rehab. She does think she could walk somewhat further than before but has not pushed her exercise capacity recently. She plans to start this in in the new year. She has not required NTG at all since her last visit.     She denies any dyspnea at rest or with exertion, PND, orthopnea, peripheral edema, palpitations, lightheadedness or syncope.     June 2023:  Had COVID a few weeks ago with symptoms of fatigue and short of breath. Device interrogation showed > 9 hours of atrial fibrillation on 5/29/23 in the setting of COVID 19 infection. She was not aware that she was in atrial fibrillation - no palpitations, lightheadedness, syncope. Continues to feel fatigued post COVID, though denies significant shortness of breath reminiscent of her prior angina. No chest pain or pressure. No orthopnea, PND. She has chronic leg swelling which improves with lasix and elevation. BLE edema has been stable. Has gained 5 lbs over the past few months. BP is usually high in clinic - home BP slightly above goal 134/60-140s.     Summary of CAD history:  10/27/2002: AMI s/p PCI+BMS (3.5x18mm Bx velocity) to mRCA  2/5/2003: Back pain; PCI+stent to mLCX c/b distal embolization/slow flow  4/11/2003: Unstable angina; PCI+stent (Hepacoat velocity) to mLAD  11/27/2007: PCI+DESx2 to pLAD (IVUS w/ calcification of LMCA and ulcerated plaque pLAD, 80% dRCA; also had 80% dLCX, too small to stent)  12/11/2007: Staged PCI. PCI+DESx1 (3.5x13mm Cypher) to dRCA (indefinite DAPT recommended at this time)  6/27/2008: Angina. mLCX stent 70% ISR. LAD and RCA stents patent. Myocardium at risk from LCX felt to be small, medical management preferred to PCI.  11/10/2009: Angina. Findings unchanged from 6/27/2008, FFR LAD 0.90. Medical management recommended.  7/23/2013: Unstable angina; PCI+ALVAREZ to mPDA.  Diagnostic findings: LMCA 40% distal. LAD: pLAD stents patent mLAD 30% ISR dLAD 50% diffuse, D2 diffuse disease. LCX 80% mid ISR. RCA diffuse <30% mid, RPLAs diffuse disease, RPDA 100% occlusion.   5/27/2022: 90% dRCA in-stent -> PCI+DESx1 to dRCA.  50-60% pRCA (return for hemodynamic assessment if symptoms persist/recur) 50-60% small LCx (medical management recommended)     PAST MEDICAL HISTORY:  Past Medical History:   Diagnosis Date    CAD (coronary artery disease)     CAD s/p PCI+BMS to MRCA 10/2002, PCI to mLCX 2/2003, PCI+DESx2 to pLAD 11/2007, PCI+DESx1 to dRCA 12/2007, PCI+ALVAREZ to RPDA 7/2013; on indefinite DAPT  10/27/2002    10/27/2002: AMI s/p PCI+BMS (3.5x18mm Bx velocity) to mRCA 2/5/2003: Back pain; PCI+stent to mLCX c/b distal embolization/slow flow 4/11/2003: Unstable angina; PCI+stent (Hepacoat velocity) to mLAD 11/27/2007: PCI+DESx2 to pLAD (IVUS w/ calcification of LMCA and ulcerated plaque pLAD, 80% dRCA; also had 80% dLCX, too small to stent) 12/11/2007: Staged PCI. PCI+DESx1 (3.5x13mm Cypher) to dRCA (indefinite DAPT recommended at this time) 6/27/2008: Angina. mLCX stent 70% ISR. LAD and RCA stents patent. Myocardium at risk from LCX felt to be small, medical management preferred to PCI. 11/10/2009: Angina. Findings unchanged from 6/27/2008, FFR LAD 0.90. Medical management recommended. 7/23/2013: Unstable angina; PCI+ALVAREZ to mPDA. Diagnostic findings: LMCA 40% distal. LAD: pLAD stents patent mLAD 30% ISR dLAD 50% diffuse, D2 diffuse disease. LCX 80% mid ISR. RCA diffuse <30% mid, RPLAs diffuse disease, RPDA 100% occlusion.      Cardiac Pacemaker- Medtronic, dual chamber- NOT dependant 11/28/2007    CKD (chronic kidney disease), stage IV (H)     Hyperlipidemia LDL goal <70     Hypertension     Stented coronary artery 10-    RCA    Stented coronary artery 2-5-2003    LCx    Stented coronary artery 4-    LAD    Stented coronary artery 11-    LAD    Stented coronary artery  "12-    RCA    Transient complete heart block (H) 11/28/2007   New device detected atrial fibrillation > 9 hours 5/29/23    CURRENT MEDICATIONS:  Current Outpatient Medications   Medication    allopurinol (ZYLOPRIM) 100 MG tablet    amLODIPine (NORVASC) 10 MG tablet    aspirin 81 MG tablet    budesonide (PULMICORT) 0.5 MG/2ML neb solution    clopidogrel (PLAVIX) 75 MG tablet    cyanocolbalamin (VITAMIN B-12) 1000 MCG tablet    fluticasone-salmeterol (ADVAIR) 250-50 MCG/ACT inhaler    furosemide (LASIX) 40 MG tablet    isosorbide mononitrate (ISMO/MONOKET) 20 MG tablet    metoprolol tartrate (LOPRESSOR) 100 MG tablet    Multiple Vitamins-Minerals (PRESERVISION AREDS 2+MULTI VIT PO)    omeprazole (PRILOSEC) 40 MG DR capsule    potassium chloride ER (K-TAB/KLOR-CON) 10 MEQ CR tablet    pravastatin (PRAVACHOL) 80 MG tablet    timolol maleate (TIMOPTIC) 0.5 % ophthalmic solution    VITAMIN D3 25 MCG (1000 UT) tablet     No current facility-administered medications for this visit.     ALLERGIES:     Allergies   Allergen Reactions    Bactrim [Sulfamethoxazole W/Trimethoprim] Other (See Comments)     Patient unable to recall    Biaxin [Clarithromycin]     Chlorthalidone Nausea and Vomiting    Clonidine     Codeine Sulfate Itching    Darvon [Propoxyphene Hcl]     Dilaudid [Hydromorphone] Visual Disturbance     Hallucinations      Gabapentin Other (See Comments) and Fatigue     \"felt drunk\"    Indomethacin     Levaquin [Levofloxacin Hemihydrate]     Morphine Sulfate     Percocet [Oxycodone-Acetaminophen] Other (See Comments)     hallucinations    Pregabalin Fatigue    Pregabalin Itching     Other reaction(s): Fatigue    Shrimp Swelling    Spironolactone Other (See Comments)     Dehydrated      Terazosin     Ciprofloxacin Rash    Simvastatin Palpitations     Muscle weakness, leg cramping         FAMILY HISTORY:  Family History   Problem Relation Age of Onset    Cancer Sister 82        bladder cancer       SOCIAL " HISTORY:  Social History     Tobacco Use    Smoking status: Former     Packs/day: 0.30     Years: 15.00     Pack years: 4.50     Types: Cigarettes    Smokeless tobacco: Never    Tobacco comments:     Quit 22+ years ago   Substance Use Topics    Drug use: No       ROS:   A comprehensive 14 point review of systems is negative other than as mentioned in HPI.    Exam:  BP (!) 172/80 (BP Location: Right arm, Patient Position: Chair, Cuff Size: Adult Large)   Pulse 80   Wt 75.5 kg (166 lb 8 oz)   SpO2 99%   BMI 26.47 kg/m    GENERAL APPEARANCE: healthy, alert and no distress  EYES: no icterus, no xanthelasmas  ENT: normal palate, mucosa moist, no central cyanosis  NECK: JVP pre-V 7 (large CV wave)   RESPIRATORY: lungs clear to auscultation - no rales, rhonchi or wheezes, no use of accessory muscles, no retractions, respirations are unlabored, normal respiratory rate  CARDIOVASCULAR: regular rhythm, II/VI systolic murmur LLSB and apex  GI: soft, non tender, bowel sounds normal,no abdominal bruits  EXTREMITIES: no edema, no bruits  NEURO: alert and oriented to person/place/time, normal speech, gait and affect  VASC: Radial, dorsalis pedis and posterior tibialis pulses 2+ bilaterally.  SKIN: no ecchymoses, no rashes.  PSYCH: cooperative, affect appropriate.     Labs:  Last Comprehensive Metabolic Panel:  Lab Results   Component Value Date     04/25/2023    POTASSIUM 4.0 04/25/2023    CHLORIDE 110 (H) 04/25/2023    CO2 23 04/25/2023    ANIONGAP 12 04/25/2023    GLC 94 04/25/2023    BUN 25.9 (H) 04/25/2023    CR 1.70 (H) 04/25/2023    GFRESTIMATED 29 (L) 04/25/2023    ALEXANDRO 9.2 04/25/2023       CBC RESULTS: Recent Labs   Lab Test 04/25/23  0850 11/21/22  1336   WBC  --  7.2   RBC  --  3.75*   HGB 11.0* 11.0*  11.0*   HCT  --  36.7   MCV  --  98   MCH  --  29.3   MCHC  --  30.6*   RDW  --  14.8   PLT  --  125*     Recent Labs   Lab Test 06/17/22  1044 08/26/20  0925   CHOL 139 153   HDL 50 52   LDL 60 71   TRIG 144 149      Testing/Procedures:  TTE 10/15/19:   Normal LV/RV function, LVEF=60-65%.   At least moderate MR and moderate-to-severe TR.  RVSP~39+RAP~15 mmHg.  Compared to 7/24/13: MR and TR have worsened.      Device check 05/11/21: Normal device function. 86.3% AP <1% . Intrinsic rhythm sinus bradycardia at 48 bpm. No atrial or ventricular arrhythmias. Lead trends stable.    Device check 3/25/22: Normal device function. AP: 78% : <0.1% Presenting EGM: AP-VS @ 83 bpm. 2 seconds of AT/AF. <0.1% AF burden. No ventricular arrhythmias.     TTE 05/17/22: LVEF:60-65%, normal RV function, moderate MR, moderate TR, normal IVC       Dr. Santoyo personally visualized:  Coronary angiogram/RHC/PCI 5/27/22:  90% dRCA in-stent -> PCI+ALVAREZ   50-60% pRCA (return for hemodynamic assessment if symptoms persist/recur)  50-60% small LCx (medical management recommended    RA 13 RV 46/13 PA 46/16 (26) PCW 15  Jamila CO 4.76 CI 2.6  PVR by Jamila CO 2.3    Device interrogation 11/29/22: 71% AP. No AF. Normal device function.    Device interrogation 5/30/23     CareAlert received for  AT/AF Daily [place] > Threshold & 9 hours in AT/AF Since Last Session.     Remote pacemaker transmission received and reviewed. Device transmission sent per MD orders.     Device: Medtronic [id number] [place] XT  MRI  Normal Device Function  Mode: AAIR>DDDR  bpm  AP: 73.5%  : 0.1%  Presenting EGM: AF with vent rate ~ 100-110 bpm  Short V-V intervals: 0  Lead Trends Appear Stable: Yes  Estimated battery longevity to RRT = 10.1 years  Atrial Arrhythmia: 2 AT/AF episodes recorded - 13 sec - > 9 hours in duration. Episode remains in progress since 5/29/23 @ 2132.  AF Rim[place]: 0.4%  Anticoagulant: None - Patient reports she is taking Plavix and ASA.  Ventricular Arrhythmia: 0  Pt Notified of Transmission Results: Yes, via telephone. Patient reports she is not feeling well and has Covid. Patient reports that she has been sick since Sunday 5/28/23. Dr. Santoyo  notified.     Plan: Device follow-up every 3 months.  GERALDO Gomez RN     Assessment and Plan:     1. Paroxysmal atrial fibrillation, new:  - [id number]-gspd of 5 (HTN, age, CAD, female) indicating high risk of stroke  - Start AC with Eliquis 2.5 mg BID (reduced dose due to CKD, age)  - Continue rate control with metoprolol     2. CAD s/p multiple PCIs as above (last to RPDA 7/23/13 and now s/p PCI+DESx1 to dRCA 5/27/22) and known 50-60% pRCA ISR, now with significant improvement in stable angina:   Dyspnea dramatically improved post-PCI-- likely her anginal equivalent (near-normal PCW on RHC at the time of coronary angiogram/PCI supports this.) Currently denies significant exertional dyspnea or other chest pain.   - Discontinue Plavix therapy with starting Eliquis as above  - Continue lifelong ASA   - continue statin  - If she has recurrent symptoms, repeat coronary angiogram and FFR/iFR of 50-60% pRCA lesion would be worth pursuing    3. Moderate mitral regurgitation, stable by TTE 5/2022  4. Moderate-to-severe tricuspid regurgitation, stable by TTE 5/2022  - No current symptoms, will continue to follow clinically and with periodic TTE  - Continue lasix, Imdur and start hydral 10 mg TID for afterload reduction and BP management      5. Renal Hypertension, controlled  6. CKD stage 4  - Currently on amlodipine 10 mg and Imdur 120 mg daily  -BP above above goal, add hydralazine 10 mg TID to her current regimen  -followed by Odilia Martin and Scar     7. Sick sinus syndrome s/p dual chamber PPM:  -routine device clinic follow up    RTC: 6 months with echo, labs and device interrogation prior    JI Loyola, CNP  Structural Heart Nurse Practitioner  [facility]  Clinic: [phone]  Pager: [phone]

## 2023-06-16 DIAGNOSIS — M10.00 IDIOPATHIC GOUT, UNSPECIFIED CHRONICITY, UNSPECIFIED SITE: ICD-10-CM

## 2023-06-16 LAB
ATRIAL RATE - MUSE: 63 BPM
DIASTOLIC BLOOD PRESSURE - MUSE: NORMAL MMHG
INTERPRETATION ECG - MUSE: NORMAL
P AXIS - MUSE: 108 DEGREES
PR INTERVAL - MUSE: 274 MS
QRS DURATION - MUSE: 88 MS
QT - MUSE: 412 MS
QTC - MUSE: 421 MS
R AXIS - MUSE: 14 DEGREES
SYSTOLIC BLOOD PRESSURE - MUSE: NORMAL MMHG
T AXIS - MUSE: 43 DEGREES
VENTRICULAR RATE- MUSE: 63 BPM

## 2023-06-21 RX ORDER — ALLOPURINOL 100 MG/1
100 TABLET ORAL DAILY
Qty: 90 TABLET | Refills: 0 | Status: SHIPPED | OUTPATIENT
Start: 2023-06-21 | End: 2023-09-22

## 2023-06-21 NOTE — TELEPHONE ENCOUNTER
allopurinol (ZYLOPRIM) 100 MG tablet       Last Written Prescription Date:  -2022  Last Fill Quantity: 90,   # refills: 1  Last Office Visit : 7-  Future Office visit:  Not on file    Routing refill request to provider for review/approval because:  Gout Agents Protocol Failed 06/16/2023 06:19 PM   Protocol Details  ALT on file in past 12 months    Has Uric Acid on file in past 12 months and value is less than 6    Normal serum creatinine on file in the past 12 months     Lab Test 06/25/18  0942   ALT 14     Lab Test 07/08/16  1127   URIC 10.4*     Lab Test 04/25/23  0850   CR 1.70*

## 2023-06-22 ENCOUNTER — TELEPHONE (OUTPATIENT)
Dept: CARDIOLOGY | Facility: CLINIC | Age: 88
End: 2023-06-22
Payer: COMMERCIAL

## 2023-06-22 DIAGNOSIS — I12.9 RENAL HYPERTENSION: ICD-10-CM

## 2023-06-22 NOTE — TELEPHONE ENCOUNTER
apixaban ANTICOAGULANT (ELIQUIS) 2.5 MG table    Remaining refills sent to requested pharmacy. Per WO per pt call request    High med alert    Warnings Override History for apixaban ANTICOAGULANT (ELIQUIS) 2.5 MG tablet [[id number]]    Overridden by Randa Ann NP on Ronnell 15, 2023 9:39 AM   Drug-Drug   1. ASPIRIN / DIRECT FACTOR XA INHIBITORS [Level: Major] [Reason: Will monitor drug levels/drug effects ]   Other Orders: aspirin 81 MG tablet

## 2023-06-22 NOTE — TELEPHONE ENCOUNTER
[facility] Call Center    Phone Message    May a detailed message be left on voicemail: yes     Reason for Call: Medication Refill Request    Has the patient contacted the pharmacy for the refill? Yes   Name of medication being requested: apixaban ANTICOAGULANT (ELIQUIS) 2.5 MG tablet     Provider who prescribed the medication: Randa Ann NP  Pharmacy: [facility] [address] [place] [address] [id number] [facility]    Date medication is needed:6/23/23     Action Taken: Other: cardiology    Travel Screening: Not Applicable     Thank you!  [facility]

## 2023-06-23 DIAGNOSIS — I10 ESSENTIAL HYPERTENSION: ICD-10-CM

## 2023-06-23 RX ORDER — AMLODIPINE BESYLATE 10 MG/1
10 TABLET ORAL DAILY
Qty: 90 TABLET | Refills: 3 | Status: CANCELLED | OUTPATIENT
Start: 2023-06-23

## 2023-06-25 NOTE — TELEPHONE ENCOUNTER
amLODIPine (NORVASC) 10 MG tablet  Last Written Prescription Date: 6/2/22  Last Fill Quantity: 90,   # refills: 3  Last Office Visit : 6/15/23  Future Office visit: 10/9/23    Routing refill request to provider for review/approval because:   Blood pressure under 140/90 in past 12 months    Normal serum creatinine on file in past 12 months   Creat H ordered by other provider.

## 2023-06-26 ENCOUNTER — TELEPHONE (OUTPATIENT)
Dept: CARDIOLOGY | Facility: CLINIC | Age: 88
End: 2023-06-26

## 2023-06-26 DIAGNOSIS — I50.33 ACUTE ON CHRONIC DIASTOLIC (CONGESTIVE) HEART FAILURE (H): ICD-10-CM

## 2023-06-26 DIAGNOSIS — I10 ESSENTIAL HYPERTENSION: ICD-10-CM

## 2023-06-26 DIAGNOSIS — N18.4 CHRONIC KIDNEY DISEASE, STAGE IV (SEVERE) (H): ICD-10-CM

## 2023-06-26 RX ORDER — FUROSEMIDE 20 MG
TABLET ORAL
Qty: 90 TABLET | Refills: 1 | Status: SHIPPED | OUTPATIENT
Start: 2023-06-26 | End: 2023-06-28

## 2023-06-26 NOTE — TELEPHONE ENCOUNTER
[facility] Call Center    Phone Message    May a detailed message be left on voicemail: yes     Reason for Call: Other:       Pt would like a call back to discuss her Eliquis as it states if any kidney problems not to take and she does have kidney issues and spoke to a pharmacy who stated its a low dose but to reach out to cardio to make sure this wqill be ok, Please reach out to pt to discuss , Pt would also like an after care summary sent to her as she was not able to receive that at her last appt    Action Taken: Message routed to:  [facility] ([facility] Cardio    Travel Screening: Not Applicable

## 2023-06-26 NOTE — PROGRESS NOTES
Date: 6/26/2023    Time of Call: 11:18 AM     Diagnosis:  Heart failure     [ TORB ] Ordering provider: Randa Ann  Order: Increase lasix to 60mg am and 40 mg in the afternoon. Have a repeat BMP and Nt-bnP in 1wk with office visit.     Order received by: Josephine Balbuena RN     Follow-up/additional notes: Increase in leg swelling and SOB

## 2023-06-28 ENCOUNTER — LAB (OUTPATIENT)
Dept: LAB | Facility: CLINIC | Age: 88
End: 2023-06-28
Payer: COMMERCIAL

## 2023-06-28 ENCOUNTER — OFFICE VISIT (OUTPATIENT)
Dept: CARDIOLOGY | Facility: CLINIC | Age: 88
End: 2023-06-28
Attending: CASE MANAGER/CARE COORDINATOR
Payer: COMMERCIAL

## 2023-06-28 VITALS
DIASTOLIC BLOOD PRESSURE: 77 MMHG | BODY MASS INDEX: 26.6 KG/M2 | HEIGHT: 66 IN | WEIGHT: 165.5 LBS | OXYGEN SATURATION: 97 % | HEART RATE: 82 BPM | SYSTOLIC BLOOD PRESSURE: 169 MMHG

## 2023-06-28 DIAGNOSIS — I25.118 CORONARY ARTERY DISEASE OF NATIVE ARTERY OF NATIVE HEART WITH STABLE ANGINA PECTORIS (H): ICD-10-CM

## 2023-06-28 DIAGNOSIS — I50.33 ACUTE ON CHRONIC DIASTOLIC (CONGESTIVE) HEART FAILURE (H): ICD-10-CM

## 2023-06-28 DIAGNOSIS — N18.4 CHRONIC KIDNEY DISEASE, STAGE IV (SEVERE) (H): ICD-10-CM

## 2023-06-28 DIAGNOSIS — I12.9 RENAL HYPERTENSION: ICD-10-CM

## 2023-06-28 DIAGNOSIS — I10 ESSENTIAL HYPERTENSION: ICD-10-CM

## 2023-06-28 DIAGNOSIS — R07.9 CHEST PAIN, UNSPECIFIED TYPE: ICD-10-CM

## 2023-06-28 DIAGNOSIS — I50.33 ACUTE ON CHRONIC DIASTOLIC (CONGESTIVE) HEART FAILURE (H): Primary | ICD-10-CM

## 2023-06-28 DIAGNOSIS — D69.6 THROMBOCYTOPENIA (H): ICD-10-CM

## 2023-06-28 DIAGNOSIS — I21.4 NSTEMI (NON-ST ELEVATED MYOCARDIAL INFARCTION) (H): ICD-10-CM

## 2023-06-28 LAB
ACANTHOCYTES BLD QL SMEAR: SLIGHT
ALBUMIN MFR UR ELPH: 14.9 MG/DL
ALBUMIN SERPL BCG-MCNC: 4.5 G/DL (ref 3.5–5.2)
ANION GAP SERPL CALCULATED.3IONS-SCNC: 13 MMOL/L (ref 7–15)
BUN SERPL-MCNC: 44.1 MG/DL (ref 8–23)
CALCIUM SERPL-MCNC: 9.4 MG/DL (ref 8.8–10.2)
CHLORIDE SERPL-SCNC: 107 MMOL/L (ref 98–107)
CREAT SERPL-MCNC: 2.07 MG/DL (ref 0.51–0.95)
CREAT UR-MCNC: 18.5 MG/DL
CREAT UR-MCNC: 18.5 MG/DL
CYSTATIN C (ROCHE): 2.8 MG/L (ref 0.6–1)
DEPRECATED HCO3 PLAS-SCNC: 23 MMOL/L (ref 22–29)
ERYTHROCYTE [DISTWIDTH] IN BLOOD BY AUTOMATED COUNT: 14.9 % (ref 10–15)
GFR SERPL CREATININE-BSD FRML MDRD: 17 ML/MIN/1.73M2
GFR SERPL CREATININE-BSD FRML MDRD: 23 ML/MIN/1.73M2
GLUCOSE SERPL-MCNC: 101 MG/DL (ref 70–99)
HCT VFR BLD AUTO: 35.5 % (ref 35–47)
HGB BLD-MCNC: 11.1 G/DL (ref 11.7–15.7)
MCH RBC QN AUTO: 29.6 PG (ref 26.5–33)
MCHC RBC AUTO-ENTMCNC: 31.3 G/DL (ref 31.5–36.5)
MCV RBC AUTO: 95 FL (ref 78–100)
MICROALBUMIN UR-MCNC: 74.9 MG/L
MICROALBUMIN/CREAT UR: 404.86 MG/G CR (ref 0–25)
NT-PROBNP SERPL-MCNC: 1423 PG/ML (ref 0–1800)
PHOSPHATE SERPL-MCNC: 3.5 MG/DL (ref 2.5–4.5)
PLAT MORPH BLD: ABNORMAL
PLATELET # BLD AUTO: 87 10E3/UL (ref 150–450)
POTASSIUM SERPL-SCNC: 4.5 MMOL/L (ref 3.4–5.3)
PROT/CREAT 24H UR: 0.81 MG/MG CR (ref 0–0.2)
RBC # BLD AUTO: 3.75 10E6/UL (ref 3.8–5.2)
RBC MORPH BLD: ABNORMAL
SODIUM SERPL-SCNC: 143 MMOL/L (ref 136–145)
WBC # BLD AUTO: 9.5 10E3/UL (ref 4–11)

## 2023-06-28 PROCEDURE — 99000 SPECIMEN HANDLING OFFICE-LAB: CPT | Performed by: PATHOLOGY

## 2023-06-28 PROCEDURE — 84156 ASSAY OF PROTEIN URINE: CPT | Performed by: PATHOLOGY

## 2023-06-28 PROCEDURE — 83880 ASSAY OF NATRIURETIC PEPTIDE: CPT | Performed by: PATHOLOGY

## 2023-06-28 PROCEDURE — 82610 CYSTATIN C: CPT | Performed by: INTERNAL MEDICINE

## 2023-06-28 PROCEDURE — 85027 COMPLETE CBC AUTOMATED: CPT | Performed by: PATHOLOGY

## 2023-06-28 PROCEDURE — G0463 HOSPITAL OUTPT CLINIC VISIT: HCPCS | Performed by: CASE MANAGER/CARE COORDINATOR

## 2023-06-28 PROCEDURE — 36415 COLL VENOUS BLD VENIPUNCTURE: CPT | Performed by: PATHOLOGY

## 2023-06-28 PROCEDURE — 80069 RENAL FUNCTION PANEL: CPT | Performed by: PATHOLOGY

## 2023-06-28 PROCEDURE — 99215 OFFICE O/P EST HI 40 MIN: CPT | Performed by: CASE MANAGER/CARE COORDINATOR

## 2023-06-28 PROCEDURE — 82570 ASSAY OF URINE CREATININE: CPT | Performed by: INTERNAL MEDICINE

## 2023-06-28 RX ORDER — FUROSEMIDE 20 MG
40 TABLET ORAL 2 TIMES DAILY
Qty: 90 TABLET | Refills: 1 | COMMUNITY
Start: 2023-06-28 | End: 2023-10-02

## 2023-06-28 RX ORDER — HYDRALAZINE HYDROCHLORIDE 10 MG/1
30 TABLET, FILM COATED ORAL 3 TIMES DAILY
Qty: 270 TABLET | Refills: 3 | COMMUNITY
Start: 2023-06-28 | End: 2023-07-31

## 2023-06-28 RX ORDER — ASPIRIN 325 MG
325 TABLET ORAL ONCE
Status: CANCELLED | OUTPATIENT
Start: 2023-06-28

## 2023-06-28 RX ORDER — SODIUM CHLORIDE 9 MG/ML
INJECTION, SOLUTION INTRAVENOUS CONTINUOUS
Status: CANCELLED | OUTPATIENT
Start: 2023-06-28

## 2023-06-28 RX ORDER — POTASSIUM CHLORIDE 1500 MG/1
20 TABLET, EXTENDED RELEASE ORAL
Status: CANCELLED | OUTPATIENT
Start: 2023-06-28

## 2023-06-28 RX ORDER — LIDOCAINE 40 MG/G
CREAM TOPICAL
Status: CANCELLED | OUTPATIENT
Start: 2023-06-28

## 2023-06-28 RX ORDER — ASPIRIN 81 MG/1
243 TABLET, CHEWABLE ORAL ONCE
Status: CANCELLED | OUTPATIENT
Start: 2023-06-28

## 2023-06-28 RX ORDER — POTASSIUM CHLORIDE 1500 MG/1
40 TABLET, EXTENDED RELEASE ORAL
Status: CANCELLED | OUTPATIENT
Start: 2023-06-28

## 2023-06-28 ASSESSMENT — PAIN SCALES - GENERAL: PAINLEVEL: NO PAIN (0)

## 2023-06-28 NOTE — NURSING NOTE
Chief Complaint   Patient presents with     Follow Up     Return Cardiology- PAF     Vitals were taken and medications reconciled.    Tyron Krishna, NEELIMA  4:44 PM

## 2023-06-28 NOTE — PATIENT INSTRUCTIONS
You were seen today in the Cardiovascular Clinic at the [facility] by:       JI DE LEON CNP    Your visit summary and instructions are as follows:    Start taking hydralazine 30mg three times per day  Lasix should go to 40mg twice daily      Return to cardiology clinic as needed     Thank you for your visit today!     Please MyChart message me or call my nurse if you have any questions or concerns.      During Business Hours:  [phone], option # 1 (University) then option # 4 (medical questions) and ask to speak with my nurse.     After hours, weekends or holidays:   [phone], Option #4  Ask to speak to the On-Call Cardiologist. Inform them you are a cardiology patient at the [facility].

## 2023-06-28 NOTE — PROGRESS NOTES
[facility] - [facility] [place]   Cardiology Clinic Note      HPI:   Ms. Tessa Mansfield is a pleasant 88 year old female with medical history pertinent for CAD (PCI at all 3 vessels at various times from 2059-7327, known 80% ISR of LCx) on indefinite DAPT, sick sinus syndrome s/p dual-chamber PPM, and newly diagnosed atrial fibrillation. She presents to cardiology clinic with complaints of high blood pressure and shortness of breath.     Tessa notes that since she was seen in clinic several weeks ago, she has been experiencing shortness of breath with any level of exertion. She feels like she has to sit down for 10-15 minutes to recover. Denies PND, orthopnea, dizziness or syncope. She experiences intermittent cardiac-related pain throughout her upper back, which is the same symptom she's felt since her first heart attack many years ago. The pain usually lasts ~5 minutes and self resolves. Chriss ankle and leg swelling have not improved since her visit to clinic several weeks ago. She wears compression stockings and keeps her feel elevated at night.    Summary of CAD history:  1. 10/27/2002: AMI s/p PCI+BMS (3.5x18mm Bx velocity) to mRCA  2. 2/5/2003: Back pain; PCI+stent to mLCX c/b distal embolization/slow flow  3. 4/11/2003: Unstable angina; PCI+stent (Hepacoat velocity) to mLAD  4. 11/27/2007: PCI+DESx2 to pLAD (IVUS w/ calcification of LMCA and ulcerated plaque pLAD, 80% dRCA; also had 80% dLCX, too small to stent)  5. 12/11/2007: Staged PCI. PCI+DESx1 (3.5x13mm Cypher) to dRCA (indefinite DAPT recommended at this time)  6. 6/27/2008: Angina. mLCX stent 70% ISR. LAD and RCA stents patent. Myocardium at risk from LCX felt to be small, medical management preferred to PCI.  7. 11/10/2009: Angina. Findings unchanged from 6/27/2008, FFR LAD 0.90. Medical management recommended.  8. 7/23/2013: Unstable angina; PCI+ALVAREZ to mPDA. Diagnostic findings: LMCA 40% distal. LAD: pLAD stents patent mLAD 30% ISR dLAD 50% diffuse, D2  diffuse disease. LCX 80% mid ISR. RCA diffuse <30% mid, RPLAs diffuse disease, RPDA 100% occlusion.   9. 5/27/2022: 90% dRCA in-stent -> PCI+DESx1 to dRCA.  50-60% pRCA (return for hemodynamic assessment if symptoms persist/recur) 50-60% small LCx (medical management recommended)    PAST MEDICAL HISTORY:  Past Medical History:   Diagnosis Date     CAD (coronary artery disease)      CAD s/p PCI+BMS to MRCA 10/2002, PCI to mLCX 2/2003, PCI+DESx2 to pLAD 11/2007, PCI+DESx1 to dRCA 12/2007, PCI+ALVAREZ to RPDA 7/2013; on indefinite DAPT  10/27/2002    10/27/2002: AMI s/p PCI+BMS (3.5x18mm Bx velocity) to mRCA 2/5/2003: Back pain; PCI+stent to mLCX c/b distal embolization/slow flow 4/11/2003: Unstable angina; PCI+stent (Hepacoat velocity) to mLAD 11/27/2007: PCI+DESx2 to pLAD (IVUS w/ calcification of LMCA and ulcerated plaque pLAD, 80% dRCA; also had 80% dLCX, too small to stent) 12/11/2007: Staged PCI. PCI+DESx1 (3.5x13mm Cypher) to dRCA (indefinite DAPT recommended at this time) 6/27/2008: Angina. mLCX stent 70% ISR. LAD and RCA stents patent. Myocardium at risk from LCX felt to be small, medical management preferred to PCI. 11/10/2009: Angina. Findings unchanged from 6/27/2008, FFR LAD 0.90. Medical management recommended. 7/23/2013: Unstable angina; PCI+ALVAREZ to mPDA. Diagnostic findings: LMCA 40% distal. LAD: pLAD stents patent mLAD 30% ISR dLAD 50% diffuse, D2 diffuse disease. LCX 80% mid ISR. RCA diffuse <30% mid, RPLAs diffuse disease, RPDA 100% occlusion.       Cardiac Pacemaker- Medtronic, dual chamber- NOT dependant 11/28/2007     CKD (chronic kidney disease), stage IV (H)      Hyperlipidemia LDL goal <70      Hypertension      Stented coronary artery 10-    RCA     Stented coronary artery 2-5-2003    LCx     Stented coronary artery 4-    LAD     Stented coronary artery 11-    LAD     Stented coronary artery 12-    RCA     Transient complete heart block (H) 11/28/2007       FAMILY  HISTORY:  Family History   Problem Relation Age of Onset     Cancer Sister 82        bladder cancer       SOCIAL HISTORY:  Social History     Socioeconomic History     Marital status:      Number of children: 4   Occupational History     Employer: [employer]   Tobacco Use     Smoking status: Former     Packs/day: 0.30     Years: 15.00     Pack years: 4.50     Types: Cigarettes     Smokeless tobacco: Never     Tobacco comments:     Quit 22+ years ago   Substance and Sexual Activity     Drug use: No     Sexual activity: Not Currently     Partners: Male     Birth control/protection: Post-menopausal   Other Topics Concern     Blood Transfusions Yes     Exercise No       CURRENT MEDICATIONS:  allopurinol (ZYLOPRIM) 100 MG tablet, Take 1 tablet (100 mg) by mouth daily Patient needs to see primary provider and have labs for further refills. Please call for an appointment at [phone].  amLODIPine (NORVASC) 10 MG tablet, Take 1 tablet (10 mg) by mouth daily  apixaban ANTICOAGULANT (ELIQUIS) 2.5 MG tablet, Take 1 tablet (2.5 mg) by mouth 2 times daily  aspirin 81 MG tablet, Take 1 tablet by mouth daily.  budesonide (PULMICORT) 0.5 MG/2ML neb solution, Take 2 mLs (0.5 mg) by nebulization 2 times daily  cyanocolbalamin (VITAMIN B-12) 1000 MCG tablet, Take 500 mcg by mouth daily As directed  fluticasone-salmeterol (ADVAIR) 250-50 MCG/ACT inhaler, INHALE 1 DOSE BY MOUTH TWICE DAILY  furosemide (LASIX) 20 MG tablet, Take 20mg in addition to 40mg tablet in the morning to = 60 mg am dose. Continue 40mg dose in the afternoon.  hydrALAZINE (APRESOLINE) 10 MG tablet, Take 3 tablets (30 mg) by mouth 3 times daily  isosorbide mononitrate (ISMO/MONOKET) 20 MG tablet, Take 2 tablets (40 mg) by mouth 3 times daily  metoprolol tartrate (LOPRESSOR) 100 MG tablet, Take 1 tablet by mouth twice daily  Multiple Vitamins-Minerals (PRESERVISION AREDS 2+MULTI VIT PO), Take by mouth 2 times daily  omeprazole (PRILOSEC) 40 MG  "DR capsule, Take 1 capsule (40 mg) by mouth daily  potassium chloride ER (K-TAB/KLOR-CON) 10 MEQ CR tablet, TAKE 2 TABLETS BY MOUTH IN THE MORNING AND 1 IN THE EVENING  pravastatin (PRAVACHOL) 80 MG tablet, Take 1 tablet (80 mg) by mouth daily  timolol maleate (TIMOPTIC) 0.5 % ophthalmic solution, INSTILL 1 DROP INTO EACH EYE ONCE DAILY IN THE MORNING  VITAMIN D3 25 MCG (1000 UT) tablet, TAKE 1 TABLET BY MOUTH ONCE DAILY **DUE  TO  BE  SEEN  FOR  MORE  REFILLS**    No current facility-administered medications on file prior to visit.      ROS:   Refer to HPI    EXAM:  BP (!) 169/77 (BP Location: Right arm, Patient Position: Chair, Cuff Size: Adult Regular)   Pulse 82   Ht 1.666 m (5' 5.59\")   Wt 75.1 kg (165 lb 8 oz)   SpO2 97%   BMI 27.05 kg/m    GENERAL: Appears comfortable, in no acute distress.   HEENT: Eye symmetrical, no discharge or icterus bilaterally. Mucous membranes moist and without lesions.  CV: RRR, +S1S2, no murmur, rub, or gallop.   RESPIRATORY: Respirations regular, even, and unlabored. Lungs CTA throughout.   GI: Soft and non distended with normoactive bowel sounds present in all quadrants. No tenderness, rebound, guarding.   EXTREMITIES: 3+ peripheral edema. 2+ bilateral pedal pulses.   NEUROLOGIC: Alert and oriented x 3. No focal deficits.   MUSCULOSKELETAL: No joint swelling or tenderness.   SKIN: No jaundice. No rashes or lesions.     Labs, reviewed with patient in clinic today:  CBC RESULTS:  Lab Results   Component Value Date    WBC 9.5 06/28/2023    WBC 5.4 08/26/2020    RBC 3.75 (L) 06/28/2023    RBC 3.58 (L) 08/26/2020    HGB 11.1 (L) 06/28/2023    HGB 11.2 (L) 08/26/2020    HCT 35.5 06/28/2023    HCT 36.0 08/26/2020    MCV 95 06/28/2023     (H) 08/26/2020    MCH 29.6 06/28/2023    MCH 31.3 08/26/2020    MCHC 31.3 (L) 06/28/2023    MCHC 31.1 (L) 08/26/2020    RDW 14.9 06/28/2023    RDW 14.9 08/26/2020    PLT 87 (L) 06/28/2023     (L) 08/26/2020       CMP RESULTS:  Lab " Results   Component Value Date     06/28/2023     08/26/2020    POTASSIUM 4.5 06/28/2023    POTASSIUM 4.8 11/21/2022    POTASSIUM 4.4 08/26/2020    CHLORIDE 107 06/28/2023    CHLORIDE 110 (H) 11/21/2022    CHLORIDE 111 (H) 08/26/2020    CO2 23 06/28/2023    CO2 24 11/21/2022    CO2 24 08/26/2020    ANIONGAP 13 06/28/2023    ANIONGAP 6 11/21/2022    ANIONGAP 8 08/26/2020     (H) 06/28/2023    GLC 79 11/21/2022    GLC 84 08/26/2020    BUN 44.1 (H) 06/28/2023    BUN 35 (H) 11/21/2022    BUN 41 (H) 08/26/2020    CR 2.07 (H) 06/28/2023    CR 1.70 (H) 08/26/2020    GFRESTIMATED 23 (L) 06/28/2023    GFRESTIMATED 27 (L) 08/26/2020    GFRESTBLACK 32 (L) 08/26/2020    ALEXANDRO 9.4 06/28/2023    ALEXANDRO 9.0 08/26/2020    BILITOTAL 0.4 06/25/2018    ALBUMIN 4.5 06/28/2023    ALBUMIN 3.8 11/21/2022    ALBUMIN 3.6 08/26/2020    ALKPHOS 104 06/25/2018    ALT 14 06/25/2018    AST 15 06/25/2018        INR RESULTS:  Lab Results   Component Value Date    INR 1.06 10/19/2021    INR 1.08 12/27/2013       Lab Results   Component Value Date    MAG 2.1 08/08/2022    MAG 2.4 (H) 01/06/2020     No results found for: NTBNPI  No results found for: NTBNP    EKG 6/15/23: A-paced HR 63        Echocardiogram:  No results found for this or any previous visit (from the past 8760 hour(s)).    Coronary Angiogram 6/17/23:    Indications    Coronary artery disease of native artery of native heart with stable angina pectoris (H) [I25.118 (ICD-10-CM)]   Shortness of breath [R06.02 (ICD-10-CM)]     Comments/Patient Narrative    87-year-old female with an extensive cardiac history (see below), CKD III, SSS s/p DC PPM with 84% pacing rate who presents for [facility] with complaints of HIGGINS.    Summary of CAD history:   1. 10/27/2002: AMI s/p PCI+BMS (3.5x18mm Bx velocity) to mRCA   2. 2/5/2003: Back pain; PCI+stent to mLCX c/b distal embolization/slow flow   3. 4/11/2003: Unstable angina; PCI+stent (Hepacoat velocity) to mLAD   4. 11/27/2007:  PCI+DESx2 to pLAD (IVUS w/ calcification of LMCA and ulcerated plaque pLAD, 80% dRCA; also had 80% dLCX, too small to stent)   5. 12/11/2007: Staged PCI. PCI+DESx1 (3.5x13mm Cypher) to dRCA (indefinite DAPT recommended at this time)   6. 6/27/2008: Angina. mLCX stent 70% ISR. LAD and RCA stents patent. Myocardium at risk from LCX felt to be small, medical management preferred to PCI.   7. 11/10/2009: Angina. Findings unchanged from 6/27/2008, FFR LAD 0.90. Medical management recommended.   8. 7/23/2013: Unstable angina; PCI+ALVAREZ to mPDA. Diagnostic findings: LMCA 40% distal. LAD: pLAD stents patent mLAD 30% ISR dLAD 50% diffuse, D2 diffuse disease. LCX 80% mid ISR. RCA diffuse <30% mid, RPLAs diffuse disease, RPDA 100% occlusion.     Pre Procedure Diagnosis    ACS > 24 hoursstable known CADother    Post Procedure Diagnosis    Multivessel CAD      Conclusion      Right sided filling pressures are moderately elevated.    Mild pulmonary hypertension.    Pulmonary capillary wedge pressures are normal.    Normal cardiac output.    90% distal RCA in stent restenotic lesion s/p successful PCI using a 2.75*32 mm Synergy ALVAREZ post dilated using a 3.0mm NC.    50-60% proximal RCA instent restenotic lesion. Can plan hemodynamic assessment of this lesion if persistently symptomatic.    50-60% ISR of the mid LCX stent - the vessel is small in this segment. Conservative management of this is recommended.         Plan      Follow bedrest per protocol    Continued medical management and lifestyle modifications for cardiovascular risk factor optimizations.    Follow up with Dr. Santoyo.    Arterial sheath removed from femoral artery with closure device.    Femoral angiogram identifies arterial sheath placement suitable for closure device.    Cardiac rehabilitation.    Discharge today per protocol      1. Continue ASA 81mg and Plavix 75mg  2. Continue guideline directed medical management for secondary prevention of CAD     Coronary  Findings    Diagnostic  Dominance: Right  Left Anterior Descending   The vessel is small.   Ost LAD to Prox LAD lesion is 50% stenosed. The lesion was previously treated using a stent of unknown type.   Prox LAD to Mid LAD lesion is 40% stenosed. The lesion was previously treated using a stent of unknown type.      First Diagonal Branch   The vessel is small.   1st Diag lesion is 85% stenosed.      Second Diagonal Branch   2nd Diag lesion is 70% stenosed.      Left Circumflex   The vessel is small.   Prox Cx to Mid Cx lesion is 55% stenosed. The lesion was previously treated using a stent of unknown type.      First Obtuse Marginal Branch   The vessel is large.      Second Obtuse Marginal Branch   The vessel is small.      Right Coronary Artery   The vessel is moderate in size.   Mid RCA to Dist RCA lesion is 90% stenosed. The lesion was previously treated using a stent of unknown type.   Dist RCA lesion is 30% stenosed. The lesion was previously treated using a stent of unknown type.      Right Posterior Descending Artery   There is mild diffuse disease throughout the vessel.   RPDA-1 lesion is 65% stenosed.   RPDA-2 lesion is 50% stenosed. The lesion was previously treated using a stent of unknown type.      Right Posterior Atrioventricular Artery   There is mild diffuse disease throughout the vessel.         Intervention     Mid RCA to Dist RCA lesion   Stent (Also treats lesions: Dist RCA)   Lesion length: 28 mm. A CATH GUIDING BLUE YELLOW PTFE [id number] guide catheter was successfully placed. The GUIDEWIRE VASC [id number] RUNTHROUGH  crossed the lesion. The pre-interventional distal flow is normal (BREANNA 3). A STENT CORONARY ALVAREZ SYNERGY XD MR US [id number] drug eluting stent was successfully placed. Pre-stent angioplasty was performed using a CATH BALLOON EMERGE [id number] supply. . Post-stent angioplasty was performed using a CATH BALLOON NC EMERGE [id number]  [id number] supply. . The post-interventional distal flow is normal (BREANNA 3). The intervention was successful. No complications occurred at this lesion.   There is a 0% residual stenosis post intervention.      Dist RCA lesion   Stent (Also treats lesions: Mid RCA to Dist RCA)   See details in Mid RCA to Dist RCA lesion.   There is a 0% residual stenosis post intervention.           Hemodynamics    RA 13  RV 46/13  PA 46/16 (26)  PCWP 15    Jamila CO 4.76 CI 2.6  PVR by Jamila CO 2.3 Right sided filling pressures are moderately elevated. Left sided filling pressures are normal. Mild elevated pulmonary hypertension. Normal cardiac output level. Hemodynamic data has been modified in Epic per physician review.     Pressures Phase: Baseline     Time Systolic (mmHg) Diastolic (mmHg) Mean (mmHg) A Wave (mmHg) V Wave (mmHg) EDP (mmHg) Max dp/dt (mmHg/sec) HR (bpm) Content (mL/dL) SAT (%)   RA Pressures  3:36 PM   10    13    14      53        RV Pressures  3:16 PM       291           3:36 PM 45    2       15     50        PA Pressures  3:38 PM 44    14    26        53        PCW Pressures  3:38 PM   16    15    20      53        AO Pressures  4:03     61    87        61          Blood Flow Results Phase: Baseline     Time Results  Indexed Values (L/min/m2)   QP  3:16 PM 4.76 L/min    2.59      QS  3:16 PM 4.76 L/min    2.59        Blood Oximetry Phase: Baseline     Time Hb  SAT(%)  PO2  Content (mL/dL) PA Sat (%)   PA  3:16 PM  63 %      63      Art  3:16 PM  96 %     13.58         Cardiac Output Phase: Baseline     Time TDCO (L/min) TDCI (L/min/m2) Jamila C.O. (L/min) Jamila C.I. (L/min/m2) Jamila HR (bpm)   Cardiac Output Results  3:16 PM   4.76    2.59         Resistance Results Phase: Baseline     Time PVR  SVR  TPR  TVR  PVR/SVR  TPR/TVR    Resistance Results (Metric)  3:16 .16 dsc-5    1294.81 dsc-5    437.21 dsc-5    1462.96 dsc-5    0.13    0.3      Resistance Results (Wood)  3:16 PM 2.1 HIDALGO    16.19 HIDALGO    5.47  HIDALGO    18.29 HIDALGO    0.13    0.3        Stoke Volume Results Phase: Baseline     Time RVSW (gm*m) LVSW (gm*m) RVSW-I (gm*m/m2) LVSW-I (gm*m/m2)   Stroke Work Results  3:16 PM 19.53    75.29    10.63    40.98            Assessment and Plan:   Ms. Mansfield is a 88 year old female with a PMH of CAD (PCI at all 3 vessels at various times from 5757-2585, known 80% ISR of LCx) on indefinite DAPT, sick sinus syndrome s/p dual-chamber PPM, and newly diagnosed atrial fibrillation.    # Multivessel CAD s/p (PCI at all 3 vessels at various times from 0656-2303, known 80% ISR of LCx)  # Shortness of breath  See above for full cardiac cath history. Most recent angiogram showed pRCA lesion with FFR/iFR of 60-60%. Tessa notes that exertional dyspnea has returned in the past few weeks  - Given significant history of CAD and low FFR, we will repeat a coronary angiogram   - DAPT: Eliquis + asa 81mg  - Continue statin    # Moderate mitral regurgitation  # Moderate-to-severe tricuspid regurgitation  Last TTE 5/2022 with EF 60-65%.  - Given increasing dyspnea, repeat echo   - Continue Lasix, Hydral, and isosorbide mononitrate for afterload reduction    # Renal hypertension  # CKD stage 4  Furosemide dose increased at last cardiology visit, BUN and Creat bump today. Pt notes home -150/80s. BP elevated in clinic today.   - Cont amlodipine 10mg daily  - Furosemide dose decreased to 40mg BID  - Hydralazine dose increased to 30mg TID  - Isosorbide mononitrate 40mg TID  - Lopressor 100mg BID  - Followed by Odilia Martin and Scar      # Paroxysmal atrial fibrillation  # SSS s/p dual chamber PPM  [id number] = 5 (HTN, age, CAD, female)  - Eliquis 2.5mg BID  - Rate control: Lopressor 100mg BID  - Routine clinic f/u with device interrogation      Follow up: with results of angiogram    Chart review time today: 10 minutes  Visit time today: 45 minutes  Total time spent today: 55 minutes        JI PEREIRA CNP  General Cardiology    06/28/23

## 2023-06-28 NOTE — LETTER
6/28/2023      RE: Tessa Mansfield  [address]  [facility] [address]       Dear Colleague,    Thank you for the opportunity to participate in the care of your patient, Tessa Mansfield, at the [facility] [place] at [facility]. Please see a copy of my visit note below.      [facility] Cardiology - [facility]   Cardiology Clinic Note      HPI:   Ms. Tessa Mansfield is a pleasant 88 year old female with medical history pertinent for CAD (PCI at all 3 vessels at various times from 8494-3048, known 80% ISR of LCx) on indefinite DAPT, sick sinus syndrome s/p dual-chamber PPM, and newly diagnosed atrial fibrillation. She presents to cardiology clinic with complaints of high blood pressure and shortness of breath.     Tessa notes that since she was seen in clinic several weeks ago, she has been experiencing shortness of breath with any level of exertion. She feels like she has to sit down for 10-15 minutes to recover. Denies PND, orthopnea, dizziness or syncope. She experiences intermittent cardiac-related pain throughout her upper back, which is the same symptom she's felt since her first heart attack many years ago. The pain usually lasts ~5 minutes and self resolves. Clarice's ankle and leg swelling have not improved since her visit to clinic several weeks ago. She wears compression stockings and keeps her feel elevated at night.    Summary of CAD history:  10/27/2002: AMI s/p PCI+BMS (3.5x18mm Bx velocity) to mRCA  2/5/2003: Back pain; PCI+stent to mLCX c/b distal embolization/slow flow  4/11/2003: Unstable angina; PCI+stent (Hepacoat velocity) to mLAD  11/27/2007: PCI+DESx2 to pLAD (IVUS w/ calcification of LMCA and ulcerated plaque pLAD, 80% dRCA; also had 80% dLCX, too small to stent)  12/11/2007: Staged PCI. PCI+DESx1 (3.5x13mm Cypher) to dRCA (indefinite DAPT recommended at this time)  6/27/2008: Angina. mLCX stent 70% ISR. LAD and RCA stents patent.  Myocardium at risk from LCX felt to be small, medical management preferred to PCI.  11/10/2009: Angina. Findings unchanged from 6/27/2008, FFR LAD 0.90. Medical management recommended.  7/23/2013: Unstable angina; PCI+ALVAREZ to mPDA. Diagnostic findings: LMCA 40% distal. LAD: pLAD stents patent mLAD 30% ISR dLAD 50% diffuse, D2 diffuse disease. LCX 80% mid ISR. RCA diffuse <30% mid, RPLAs diffuse disease, RPDA 100% occlusion.   5/27/2022: 90% dRCA in-stent -> PCI+DESx1 to dRCA.  50-60% pRCA (return for hemodynamic assessment if symptoms persist/recur) 50-60% small LCx (medical management recommended)    PAST MEDICAL HISTORY:  Past Medical History:   Diagnosis Date    CAD (coronary artery disease)     CAD s/p PCI+BMS to MRCA 10/2002, PCI to mLCX 2/2003, PCI+DESx2 to pLAD 11/2007, PCI+DESx1 to dRCA 12/2007, PCI+ALVAREZ to RPDA 7/2013; on indefinite DAPT  10/27/2002    10/27/2002: AMI s/p PCI+BMS (3.5x18mm Bx velocity) to mRCA 2/5/2003: Back pain; PCI+stent to mLCX c/b distal embolization/slow flow 4/11/2003: Unstable angina; PCI+stent (Hepacoat velocity) to mLAD 11/27/2007: PCI+DESx2 to pLAD (IVUS w/ calcification of LMCA and ulcerated plaque pLAD, 80% dRCA; also had 80% dLCX, too small to stent) 12/11/2007: Staged PCI. PCI+DESx1 (3.5x13mm Cypher) to dRCA (indefinite DAPT recommended at this time) 6/27/2008: Angina. mLCX stent 70% ISR. LAD and RCA stents patent. Myocardium at risk from LCX felt to be small, medical management preferred to PCI. 11/10/2009: Angina. Findings unchanged from 6/27/2008, FFR LAD 0.90. Medical management recommended. 7/23/2013: Unstable angina; PCI+ALVAREZ to mPDA. Diagnostic findings: LMCA 40% distal. LAD: pLAD stents patent mLAD 30% ISR dLAD 50% diffuse, D2 diffuse disease. LCX 80% mid ISR. RCA diffuse <30% mid, RPLAs diffuse disease, RPDA 100% occlusion.      Cardiac Pacemaker- Medtronic, dual chamber- NOT dependant 11/28/2007    CKD (chronic kidney disease), stage IV (H)     Hyperlipidemia LDL goal  <70     Hypertension     Stented coronary artery 10-    RCA    Stented coronary artery 2-5-2003    LCx    Stented coronary artery 4-    LAD    Stented coronary artery 11-    LAD    Stented coronary artery 12-    RCA    Transient complete heart block (H) 11/28/2007       FAMILY HISTORY:  Family History   Problem Relation Age of Onset    Cancer Sister 82        bladder cancer       SOCIAL HISTORY:  Social History     Socioeconomic History    Marital status:     Number of children: 4   Occupational History     Employer: [employer]   Tobacco Use    Smoking status: Former     Packs/day: 0.30     Years: 15.00     Pack years: 4.50     Types: Cigarettes    Smokeless tobacco: Never    Tobacco comments:     Quit 22+ years ago   Substance and Sexual Activity    Drug use: No    Sexual activity: Not Currently     Partners: Male     Birth control/protection: Post-menopausal   Other Topics Concern    Blood Transfusions Yes    Exercise No       CURRENT MEDICATIONS:  allopurinol (ZYLOPRIM) 100 MG tablet, Take 1 tablet (100 mg) by mouth daily Patient needs to see primary provider and have labs for further refills. Please call for an appointment at [phone].  amLODIPine (NORVASC) 10 MG tablet, Take 1 tablet (10 mg) by mouth daily  apixaban ANTICOAGULANT (ELIQUIS) 2.5 MG tablet, Take 1 tablet (2.5 mg) by mouth 2 times daily  aspirin 81 MG tablet, Take 1 tablet by mouth daily.  budesonide (PULMICORT) 0.5 MG/2ML neb solution, Take 2 mLs (0.5 mg) by nebulization 2 times daily  cyanocolbalamin (VITAMIN B-12) 1000 MCG tablet, Take 500 mcg by mouth daily As directed  fluticasone-salmeterol (ADVAIR) 250-50 MCG/ACT inhaler, INHALE 1 DOSE BY MOUTH TWICE DAILY  furosemide (LASIX) 20 MG tablet, Take 20mg in addition to 40mg tablet in the morning to = 60 mg am dose. Continue 40mg dose in the afternoon.  hydrALAZINE (APRESOLINE) 10 MG tablet, Take 3 tablets (30 mg) by mouth 3 times  "daily  isosorbide mononitrate (ISMO/MONOKET) 20 MG tablet, Take 2 tablets (40 mg) by mouth 3 times daily  metoprolol tartrate (LOPRESSOR) 100 MG tablet, Take 1 tablet by mouth twice daily  Multiple Vitamins-Minerals (PRESERVISION AREDS 2+MULTI VIT PO), Take by mouth 2 times daily  omeprazole (PRILOSEC) 40 MG DR capsule, Take 1 capsule (40 mg) by mouth daily  potassium chloride ER (K-TAB/KLOR-CON) 10 MEQ CR tablet, TAKE 2 TABLETS BY MOUTH IN THE MORNING AND 1 IN THE EVENING  pravastatin (PRAVACHOL) 80 MG tablet, Take 1 tablet (80 mg) by mouth daily  timolol maleate (TIMOPTIC) 0.5 % ophthalmic solution, INSTILL 1 DROP INTO EACH EYE ONCE DAILY IN THE MORNING  VITAMIN D3 25 MCG (1000 UT) tablet, TAKE 1 TABLET BY MOUTH ONCE DAILY **DUE  TO  BE  SEEN  FOR  MORE  REFILLS**    No current facility-administered medications on file prior to visit.      ROS:   Refer to HPI    EXAM:  BP (!) 169/77 (BP Location: Right arm, Patient Position: Chair, Cuff Size: Adult Regular)   Pulse 82   Ht 1.666 m (5' 5.59\")   Wt 75.1 kg (165 lb 8 oz)   SpO2 97%   BMI 27.05 kg/m    GENERAL: Appears comfortable, in no acute distress.   HEENT: Eye symmetrical, no discharge or icterus bilaterally. Mucous membranes moist and without lesions.  CV: RRR, +S1S2, no murmur, rub, or gallop.   RESPIRATORY: Respirations regular, even, and unlabored. Lungs CTA throughout.   GI: Soft and non distended with normoactive bowel sounds present in all quadrants. No tenderness, rebound, guarding.   EXTREMITIES: 3+ peripheral edema. 2+ bilateral pedal pulses.   NEUROLOGIC: Alert and oriented x 3. No focal deficits.   MUSCULOSKELETAL: No joint swelling or tenderness.   SKIN: No jaundice. No rashes or lesions.     Labs, reviewed with patient in clinic today:  CBC RESULTS:  Lab Results   Component Value Date    WBC 9.5 06/28/2023    WBC 5.4 08/26/2020    RBC 3.75 (L) 06/28/2023    RBC 3.58 (L) 08/26/2020    HGB 11.1 (L) 06/28/2023    HGB 11.2 (L) 08/26/2020    HCT " 35.5 06/28/2023    HCT 36.0 08/26/2020    MCV 95 06/28/2023     (H) 08/26/2020    MCH 29.6 06/28/2023    MCH 31.3 08/26/2020    MCHC 31.3 (L) 06/28/2023    MCHC 31.1 (L) 08/26/2020    RDW 14.9 06/28/2023    RDW 14.9 08/26/2020    PLT 87 (L) 06/28/2023     (L) 08/26/2020       CMP RESULTS:  Lab Results   Component Value Date     06/28/2023     08/26/2020    POTASSIUM 4.5 06/28/2023    POTASSIUM 4.8 11/21/2022    POTASSIUM 4.4 08/26/2020    CHLORIDE 107 06/28/2023    CHLORIDE 110 (H) 11/21/2022    CHLORIDE 111 (H) 08/26/2020    CO2 23 06/28/2023    CO2 24 11/21/2022    CO2 24 08/26/2020    ANIONGAP 13 06/28/2023    ANIONGAP 6 11/21/2022    ANIONGAP 8 08/26/2020     (H) 06/28/2023    GLC 79 11/21/2022    GLC 84 08/26/2020    BUN 44.1 (H) 06/28/2023    BUN 35 (H) 11/21/2022    BUN 41 (H) 08/26/2020    CR 2.07 (H) 06/28/2023    CR 1.70 (H) 08/26/2020    GFRESTIMATED 23 (L) 06/28/2023    GFRESTIMATED 27 (L) 08/26/2020    GFRESTBLACK 32 (L) 08/26/2020    ALEXANDRO 9.4 06/28/2023    ALEXANDRO 9.0 08/26/2020    BILITOTAL 0.4 06/25/2018    ALBUMIN 4.5 06/28/2023    ALBUMIN 3.8 11/21/2022    ALBUMIN 3.6 08/26/2020    ALKPHOS 104 06/25/2018    ALT 14 06/25/2018    AST 15 06/25/2018        INR RESULTS:  Lab Results   Component Value Date    INR 1.06 10/19/2021    INR 1.08 12/27/2013       Lab Results   Component Value Date    MAG 2.1 08/08/2022    MAG 2.4 (H) 01/06/2020     No results found for: NTBNPI  No results found for: NTBNP    EKG 6/15/23: A-paced HR 63        Echocardiogram:  No results found for this or any previous visit (from the past 8760 hour(s)).    Coronary Angiogram 6/17/23:    Indications    Coronary artery disease of native artery of native heart with stable angina pectoris (H) [I25.118 (ICD-10-CM)]   Shortness of breath [R06.02 (ICD-10-CM)]     Comments/Patient Narrative    87-year-old female with an extensive cardiac history (see below), CKD III, SSS s/p DC PPM with 84% pacing rate who  presents for [facility] with complaints of HIGGINS.    Summary of CAD history:   1. 10/27/2002: AMI s/p PCI+BMS (3.5x18mm Bx velocity) to mRCA   2. 2/5/2003: Back pain; PCI+stent to mLCX c/b distal embolization/slow flow   3. 4/11/2003: Unstable angina; PCI+stent (Hepacoat velocity) to mLAD   4. 11/27/2007: PCI+DESx2 to pLAD (IVUS w/ calcification of LMCA and ulcerated plaque pLAD, 80% dRCA; also had 80% dLCX, too small to stent)   5. 12/11/2007: Staged PCI. PCI+DESx1 (3.5x13mm Cypher) to dRCA (indefinite DAPT recommended at this time)   6. 6/27/2008: Angina. mLCX stent 70% ISR. LAD and RCA stents patent. Myocardium at risk from LCX felt to be small, medical management preferred to PCI.   7. 11/10/2009: Angina. Findings unchanged from 6/27/2008, FFR LAD 0.90. Medical management recommended.   8. 7/23/2013: Unstable angina; PCI+ALVAREZ to mPDA. Diagnostic findings: LMCA 40% distal. LAD: pLAD stents patent mLAD 30% ISR dLAD 50% diffuse, D2 diffuse disease. LCX 80% mid ISR. RCA diffuse <30% mid, RPLAs diffuse disease, RPDA 100% occlusion.     Pre Procedure Diagnosis    ACS > 24 hoursstable known CADother    Post Procedure Diagnosis    Multivessel CAD      Conclusion    Right sided filling pressures are moderately elevated.  Mild pulmonary hypertension.  Pulmonary capillary wedge pressures are normal.  Normal cardiac output.  90% distal RCA in stent restenotic lesion s/p successful PCI using a 2.75*32 mm Synergy ALVAREZ post dilated using a 3.0mm NC.  50-60% proximal RCA instent restenotic lesion. Can plan hemodynamic assessment of this lesion if persistently symptomatic.  50-60% ISR of the mid LCX stent - the vessel is small in this segment. Conservative management of this is recommended.         Plan     Follow bedrest per protocol   Continued medical management and lifestyle modifications for cardiovascular risk factor optimizations.   Follow up with Dr. Santoyo.   Arterial sheath removed from femoral artery with closure  device.   Femoral angiogram identifies arterial sheath placement suitable for closure device.   Cardiac rehabilitation.   Discharge today per protocol      1. Continue ASA 81mg and Plavix 75mg  2. Continue guideline directed medical management for secondary prevention of CAD     Coronary Findings    Diagnostic  Dominance: Right  Left Anterior Descending   The vessel is small.   Ost LAD to Prox LAD lesion is 50% stenosed. The lesion was previously treated using a stent of unknown type.   Prox LAD to Mid LAD lesion is 40% stenosed. The lesion was previously treated using a stent of unknown type.      First Diagonal Branch   The vessel is small.   1st Diag lesion is 85% stenosed.      Second Diagonal Branch   2nd Diag lesion is 70% stenosed.      Left Circumflex   The vessel is small.   Prox Cx to Mid Cx lesion is 55% stenosed. The lesion was previously treated using a stent of unknown type.      First Obtuse Marginal Branch   The vessel is large.      Second Obtuse Marginal Branch   The vessel is small.      Right Coronary Artery   The vessel is moderate in size.   Mid RCA to Dist RCA lesion is 90% stenosed. The lesion was previously treated using a stent of unknown type.   Dist RCA lesion is 30% stenosed. The lesion was previously treated using a stent of unknown type.      Right Posterior Descending Artery   There is mild diffuse disease throughout the vessel.   RPDA-1 lesion is 65% stenosed.   RPDA-2 lesion is 50% stenosed. The lesion was previously treated using a stent of unknown type.      Right Posterior Atrioventricular Artery   There is mild diffuse disease throughout the vessel.         Intervention     Mid RCA to Dist RCA lesion   Stent (Also treats lesions: Dist RCA)   Lesion length: 28 mm. A CATH GUIDING BLUE YELLOW PTFE [id number] guide catheter was successfully placed. The GUIDEWIRE VASC [id number] RUNTHROUGH  crossed the lesion. The pre-interventional distal flow is normal (BREANNA  3). A STENT CORONARY ALVAREZ SYNERGY XMORENO MR US 2.60Y92OE [id number] drug eluting stent was successfully placed. Pre-stent angioplasty was performed using a CATH BALLOON EMERGE 2.0X20MM [id number] supply. . Post-stent angioplasty was performed using a CATH BALLOON NC EMERGE [id number] supply. . The post-interventional distal flow is normal (BREANNA 3). The intervention was successful. No complications occurred at this lesion.   There is a 0% residual stenosis post intervention.      Dist RCA lesion   Stent (Also treats lesions: Mid RCA to Dist RCA)   See details in Mid RCA to Dist RCA lesion.   There is a 0% residual stenosis post intervention.           Hemodynamics    RA 13  RV 46/13  PA 46/16 (26)  PCWP 15    Jamila CO 4.76 CI 2.6  PVR by Jamila CO 2.3 Right sided filling pressures are moderately elevated. Left sided filling pressures are normal. Mild elevated pulmonary hypertension. Normal cardiac output level. Hemodynamic data has been modified in Epic per physician review.     Pressures Phase: Baseline     Time Systolic (mmHg) Diastolic (mmHg) Mean (mmHg) A Wave (mmHg) V Wave (mmHg) EDP (mmHg) Max dp/dt (mmHg/sec) HR (bpm) Content (mL/dL) SAT (%)   RA Pressures  3:36 PM   10    13    14      53        RV Pressures  3:16 PM       291           3:36 PM 45    2       15     50        PA Pressures  3:38 PM 44    14    26        53        PCW Pressures  3:38 PM   16    15    20      53        AO Pressures  4:03     61    87        61          Blood Flow Results Phase: Baseline     Time Results  Indexed Values (L/min/m2)   QP  3:16 PM 4.76 L/min    2.59      QS  3:16 PM 4.76 L/min    2.59        Blood Oximetry Phase: Baseline     Time Hb  SAT(%)  PO2  Content (mL/dL) PA Sat (%)   PA  3:16 PM  63 %      63      Art  3:16 PM  96 %     13.58         Cardiac Output Phase: Baseline     Time TDCO (L/min) TDCI (L/min/m2) Jamila C.O. (L/min) Jamila C.I. (L/min/m2) Jamila HR (bpm)   Cardiac Output Results  3:16 PM    4.76    2.59         Resistance Results Phase: Baseline     Time PVR  SVR  TPR  TVR  PVR/SVR  TPR/TVR    Resistance Results (Metric)  3:16 .16 dsc-5    1294.81 dsc-5    437.21 dsc-5    1462.96 dsc-5    0.13    0.3      Resistance Results (Wood)  3:16 PM 2.1 HIDALGO    16.19 HIDALGO    5.47 HIDALGO    18.29 HIDALGO    0.13    0.3        Stoke Volume Results Phase: Baseline     Time RVSW (gm*m) LVSW (gm*m) RVSW-I (gm*m/m2) LVSW-I (gm*m/m2)   Stroke Work Results  3:16 PM 19.53    75.29    10.63    40.98            Assessment and Plan:   Ms. Mansfield is a 88 year old female with a PMH of CAD (PCI at all 3 vessels at various times from 6771-9833, known 80% ISR of LCx) on indefinite DAPT, sick sinus syndrome s/p dual-chamber PPM, and newly diagnosed atrial fibrillation.    # Multivessel CAD s/p (PCI at all 3 vessels at various times from 6481-0344, known 80% ISR of LCx)  # Shortness of breath  See above for full cardiac cath history. Most recent angiogram showed pRCA lesion with FFR/iFR of 60-60%. Tessa notes that exertional dyspnea has returned in the past few weeks  - Given significant history of CAD and low FFR, we will repeat a coronary angiogram   - DAPT: Eliquis + asa 81mg  - Continue statin    # Moderate mitral regurgitation  # Moderate-to-severe tricuspid regurgitation  Last TTE 5/2022 with EF 60-65%.  - Given increasing dyspnea, repeat echo   - Continue Lasix, Hydral, and isosorbide mononitrate for afterload reduction    # Renal hypertension  # CKD stage 4  Furosemide dose increased at last cardiology visit, BUN and Creat bump today. Pt notes home -150/80s. BP elevated in clinic today.   - Cont amlodipine 10mg daily  - Furosemide dose decreased to 40mg BID  - Hydralazine dose increased to 30mg TID  - Isosorbide mononitrate 40mg TID  - Lopressor 100mg BID  - Followed by Odilia Martin and Scar      # Paroxysmal atrial fibrillation  # SSS s/p dual chamber PPM  [id number] = 5 (HTN, age, CAD, female)  - Eliquis 2.5mg  BID  - Rate control: Lopressor 100mg BID  - Routine clinic f/u with device interrogation      Follow up: with results of angiogram    Chart review time today: 10 minutes  Visit time today: 45 minutes  Total time spent today: 55 minutes        JI PEREIRA CNP  General Cardiology   06/28/23

## 2023-06-30 ENCOUNTER — TELEPHONE (OUTPATIENT)
Dept: CARDIOLOGY | Facility: CLINIC | Age: 88
End: 2023-06-30
Payer: COMMERCIAL

## 2023-06-30 DIAGNOSIS — I10 ESSENTIAL HYPERTENSION: ICD-10-CM

## 2023-06-30 DIAGNOSIS — I25.118 CORONARY ARTERY DISEASE OF NATIVE ARTERY OF NATIVE HEART WITH STABLE ANGINA PECTORIS (H): Primary | ICD-10-CM

## 2023-06-30 RX ORDER — AMLODIPINE BESYLATE 10 MG/1
10 TABLET ORAL DAILY
Qty: 90 TABLET | Refills: 1 | Status: SHIPPED | OUTPATIENT
Start: 2023-06-30 | End: 2024-01-06

## 2023-06-30 NOTE — TELEPHONE ENCOUNTER
[facility] Call Center    Phone Message    May a detailed message be left on voicemail: yes     Reason for Call: Medication Refill Request    Has the patient contacted the pharmacy for the refill? Yes   Name of medication being requested: amLODIPine (NORVASC) 10 MG tablet  Provider who prescribed the medication: Stevan  [facility] [address] [place] [address] [address] [facility]   Date medication is needed: Pt is totally out - Pharmacy has contacted Dr. Santoyo 6.16, 6.19 6.22, 6.28 attempted to get this med refilled       Action Taken: Message routed to:  [facility] cardio    Travel Screening: Not Applicable     Thank you!  [facility]

## 2023-06-30 NOTE — LETTER
July 5, 2023      TO: Tessa Mansfield  [address]  [facility] [address]     Dear Tessa,    Pre-procedure instructions - Coronary Angiogram  Patient Education    Your arrival time is 10:00 am on Monday July 17th.  Location is [facility] - [address] [place] [id number] [facility] Waiting Room  Please plan on being at the hospital all day.  At any time, emergencies and/or urgent cases may come up which could delay the start of your procedure.    Pre-procedure instructions - Coronary Angiogram  Shower in the evening before or the morning of the procedure  No solid food for 8 hours prior and nothing to drink 2 hours prior to arrival time  You can take your morning medications (except for diabetic and blood thinners) with sips of water.  Take 325 mg of Asprin 24 hours prior to the procedure and the morning of procedure.   You will need to arrange a ride to drop you off and pick you up, as you will be unable to drive home.  Prior to discharge you may be required to lay flat for approximately 2-4 hours in the recovery unit to ensure proper clotting of the artery.    Anticoagulation Medication Instructions   apixaban (ELIQUIS) - Hold 48 hours prior to procedure, do not take Saturday, July 15 or Sunday, July 16.    You will need to follow up with one of our cardiology APPs 1-2 weeks after your procedure. If you need help scheduling or rescheduling your appointment, please call [phone]    Sincerely,    Tari Denson RN, BSN, CVN  Interventional Cardiology Coordinator  [phone]

## 2023-06-30 NOTE — TELEPHONE ENCOUNTER
" amLODIPine (NORVASC) 10 MG tablet     Last Written Prescription Date:  6/2/22  Last Fill Quantity: 90,   # refills: 3  Last Office Visit : 6/28/23  Future Office visit:  10/9/23       06/28/23 (!) 169/77   06/15/23 (!) 172/80   05/01/23 (!) 156/76     6/28/23 Cr=2.07r reviewed by LARS Ann 6/28/23   Last note 6/28/23\"  Renal hypertension  # CKD stage 4  Furosemide dose increased at last cardiology visit, BUN and Creat bump today. Pt notes home -150/80s. BP elevated in clinic today.   - Cont amlodipine 10mg daily  - Furosemide dose decreased to 40mg BID  - Hydralazine dose increased to 30mg TID  - Isosorbide mononitrate 40mg TID  - Lopressor 100mg BID  - Followed by Odilia Martin and Scar\"                      "

## 2023-06-30 NOTE — TELEPHONE ENCOUNTER
[facility] Call Center    Phone Message    May a detailed message be left on voicemail: yes     Reason for Call: Order(s): Other:   Reason for requested: Echo Cardiogram and an Angio Gram  Date needed: as soon as [facility] can submit them  Provider name: Kristi Oates      Action Taken: Message routed to:  [facility] cardio    Travel Screening: Not Applicable     Thank you!  [facility]

## 2023-07-01 DIAGNOSIS — I10 ESSENTIAL HYPERTENSION: ICD-10-CM

## 2023-07-05 NOTE — CONFIDENTIAL NOTE
Pre-procedure instructions - Coronary Angiogram  Patient Education    1. Your arrival time is 10:00 am on Monday July 17th.  Location is [address] Waiting Room  2. Please plan on being at the hospital all day.  3. At any time, emergencies and/or urgent cases may come up which could delay the start of your procedure.    Pre-procedure instructions - Coronary Angiogram  - Shower in the evening before or the morning of the procedure  - No solid food for 8 hours prior and nothing to drink 2 hours prior to arrival time  - You can take your morning medications (except for diabetic and blood thinners) with sips of water.  - Take 325 mg of Asprin 24 hours prior to the procedure and the morning of procedure.   - You will need to arrange a ride to drop you off and pick you up, as you will be unable to drive home.  Prior to discharge you may be required to lay flat for approximately 2-4 hours in the recovery unit to ensure proper clotting of the artery.                        Anticoagulation Medication Instructions   apixaban (ELIQUIS) - Hold 48 hours prior to procedure, do not take Saturday, July 15 or Sunday, July 16.    You will need to follow up with one of our cardiology APPs 1-2 weeks after your procedure. If you need help scheduling or rescheduling your appointment, please call [phone]    Patient states she understands and agrees to the plan

## 2023-07-06 RX ORDER — METOPROLOL TARTRATE 100 MG
100 TABLET ORAL 2 TIMES DAILY
Qty: 180 TABLET | Refills: 0 | Status: SHIPPED | OUTPATIENT
Start: 2023-07-06 | End: 2023-10-11

## 2023-07-06 NOTE — TELEPHONE ENCOUNTER
metoprolol tartrate (LOPRESSOR) 100 MG tablet      Last Written Prescription Date:  1/3/23  Last Fill Quantity: 180,   # refills: 1  Last Office Visit : 7/21/22  Future Office visit:  0    Routing refill request to provider for review/approval because:  Blood pressure out of range     Blood pressure under 140/90 in past 12 months        BP Readings from Last 3 Encounters:   06/28/23 (!) 169/77   06/15/23 (!) 172/80   05/01/23 (!) 156/76

## 2023-07-10 ENCOUNTER — ANCILLARY PROCEDURE (OUTPATIENT)
Dept: CARDIOLOGY | Facility: CLINIC | Age: 88
End: 2023-07-10
Attending: INTERNAL MEDICINE
Payer: COMMERCIAL

## 2023-07-10 DIAGNOSIS — I49.5 SICK SINUS SYNDROME (H): ICD-10-CM

## 2023-07-10 PROCEDURE — 99207 CARDIAC DEVICE CHECK - REMOTE: CPT | Performed by: INTERNAL MEDICINE

## 2023-07-13 ENCOUNTER — TELEPHONE (OUTPATIENT)
Dept: CARDIOLOGY | Facility: CLINIC | Age: 88
End: 2023-07-13

## 2023-07-13 ENCOUNTER — CARE COORDINATION (OUTPATIENT)
Dept: CARDIOLOGY | Facility: CLINIC | Age: 88
End: 2023-07-13

## 2023-07-13 ENCOUNTER — OFFICE VISIT (OUTPATIENT)
Dept: URGENT CARE | Facility: URGENT CARE | Age: 88
End: 2023-07-13
Payer: COMMERCIAL

## 2023-07-13 VITALS
TEMPERATURE: 97 F | SYSTOLIC BLOOD PRESSURE: 111 MMHG | OXYGEN SATURATION: 95 % | WEIGHT: 162 LBS | DIASTOLIC BLOOD PRESSURE: 63 MMHG | HEART RATE: 87 BPM | BODY MASS INDEX: 26.47 KG/M2 | RESPIRATION RATE: 17 BRPM

## 2023-07-13 DIAGNOSIS — S89.91XA INJURY OF RIGHT SHIN, INITIAL ENCOUNTER: Primary | ICD-10-CM

## 2023-07-13 DIAGNOSIS — N18.4 CKD (CHRONIC KIDNEY DISEASE) STAGE 4, GFR 15-29 ML/MIN (H): ICD-10-CM

## 2023-07-13 DIAGNOSIS — Z79.01 LONG TERM CURRENT USE OF ANTICOAGULANT THERAPY: ICD-10-CM

## 2023-07-13 PROCEDURE — 99213 OFFICE O/P EST LOW 20 MIN: CPT | Performed by: PHYSICIAN ASSISTANT

## 2023-07-13 ASSESSMENT — ENCOUNTER SYMPTOMS
WOUND: 1
ENDOCRINE NEGATIVE: 1
JOINT SWELLING: 0
DIZZINESS: 0
NECK STIFFNESS: 0
MYALGIAS: 0
MUSCULOSKELETAL NEGATIVE: 1
DIARRHEA: 0
PALPITATIONS: 0
NAUSEA: 0
NECK PAIN: 0
FEVER: 0
SHORTNESS OF BREATH: 0
EYES NEGATIVE: 1
HEADACHES: 0
SORE THROAT: 0
ALLERGIC/IMMUNOLOGIC NEGATIVE: 1
BACK PAIN: 0
LIGHT-HEADEDNESS: 0
VOMITING: 0
CARDIOVASCULAR NEGATIVE: 1
ARTHRALGIAS: 0
COUGH: 0
WEAKNESS: 0
RHINORRHEA: 0
RESPIRATORY NEGATIVE: 1
CHILLS: 0

## 2023-07-13 NOTE — PROGRESS NOTES
Tessa's visit to the urgent care regarding the bruise/blood clot on the front of her shin was reviewed with Randa Ann NP.  Tessa should continue to monitor the size of the bruise/blood clot. If it continues to increase in size, Tessa should return to the urgent care for further evaluation.  Otherwise, this will not effect her having an angiogram procedure on Monday, July 17.  Eliquis medication instructions were reviewed with Tessa.  She should hold the Eliquis on July 15 and 16th, and NOT take it on the morning of her procedure as well.  She verbalized understanding to all this information and said her questions were answered.

## 2023-07-13 NOTE — PROGRESS NOTES
Chief Complaint:     Chief Complaint   Patient presents with    Fall     Injury to right leg x last week now has blood clot blister         ASSESSMENT     1. Injury of right shin, initial encounter    2. Long term current use of anticoagulant therapy         PLAN    No signs of infection today and patient is ambulating well. Discussed that the bump should go away on its own with time.  RICE discussed  Recommended rest and avoidance of activities which cause pain or swelling.  Pain relief: Acetaminophen.  NSAIDS contraindicated with anticoagulation therapy, and CKD.  Patient verbalized understanding, and agrees with this plan.    HPI: Tessa Mansfield is an 88 year old female who presents today for evaluation of R leg injury.  Patient has Hx of anticoagulation therapy, and CKD.  Onset of the injury was 1 week ago after patient hit R shin on tub when entering. It bruised and a large lump formed. Yesterday noticed a red streak going from the bump towards her foot. Would like this to be checked out as she has an angiogram coming up on Monday. Is ambulating at baseline with some shin pain. No fevers, chills.     Patient denies any numbness, tingling, or dysfunction of the R leg.    ROS:      Review of Systems   Constitutional:  Negative for chills and fever.   HENT:  Negative for congestion, ear pain, rhinorrhea and sore throat.    Eyes: Negative.    Respiratory: Negative.  Negative for cough and shortness of breath.    Cardiovascular: Negative.  Negative for chest pain and palpitations.   Gastrointestinal:  Negative for diarrhea, nausea and vomiting.   Endocrine: Negative.    Genitourinary: Negative.    Musculoskeletal: Negative.  Negative for arthralgias, back pain, joint swelling, myalgias, neck pain and neck stiffness.   Skin:  Positive for wound. Negative for rash.   Allergic/Immunologic: Negative.  Negative for immunocompromised state.   Neurological:  Negative for dizziness, weakness, light-headedness and  "headaches.        Problem history  Patient Active Problem List   Diagnosis    Degeneration of cervical intervertebral disc    Nonallopathic lesion of cervical region    Nonallopathic lesion of thoracic region    Coronary artery disease of native artery of native heart with stable angina pectoris (H)    Dizziness and giddiness    Edema    Esophageal reflux    Gout    Essential hypertension    Obstructive sleep apnea    Osteomalacia    Post-menopausal osteoporosis    Cardiac Pacemaker- [employer]tr[employer], dual chamber- NOT dependant    Transient complete heart block (H)    Sinus node dysfunction (H)    Disturbance of skin sensation    NSTEMI (non-ST elevated myocardial infarction) (H)    Arrhythmia    Sick sinus syndrome (H)    Non-rheumatic mitral regurgitation    Non-rheumatic tricuspid valve insufficiency    CKD (chronic kidney disease) stage 4, GFR 15-29 ml/min (H)    Status post coronary angiogram    Secondary renal hyperparathyroidism (H)    Thrombocytopenia (H)        Allergies  Allergies   Allergen Reactions    Bactrim [Sulfamethoxazole W/Trimethoprim] Other (See Comments)     Patient unable to recall    Biaxin [Clarithromycin]     Chlorthalidone Nausea and Vomiting    Clonidine     Codeine Sulfate Itching    Darvon [Propoxyphene Hcl]     Dilaudid [Hydromorphone] Visual Disturbance     Hallucinations      Gabapentin Other (See Comments) and Fatigue     \"felt drunk\"    Indomethacin     Levaquin [Levofloxacin Hemihydrate]     Morphine Sulfate     Percocet [Oxycodone-Acetaminophen] Other (See Comments)     hallucinations    Pregabalin Fatigue    Pregabalin Itching     Other reaction(s): Fatigue    Shrimp Swelling    Spironolactone Other (See Comments)     Dehydrated      Terazosin     Ciprofloxacin Rash    Simvastatin Palpitations     Muscle weakness, leg cramping          Smoking History  History   Smoking Status    Former    Packs/day: 0.30    Years: 15.00    Types: Cigarettes   Smokeless Tobacco    Never        Current " Meds    Current Outpatient Medications:     allopurinol (ZYLOPRIM) 100 MG tablet, Take 1 tablet (100 mg) by mouth daily Patient needs to see primary provider and have labs for further refills. Please call for an appointment at [phone]., Disp: 90 tablet, Rfl: 0    amLODIPine (NORVASC) 10 MG tablet, Take 1 tablet (10 mg) by mouth daily, Disp: 90 tablet, Rfl: 1    apixaban ANTICOAGULANT (ELIQUIS) 2.5 MG tablet, Take 1 tablet (2.5 mg) by mouth 2 times daily, Disp: 180 tablet, Rfl: 3    aspirin 81 MG tablet, Take 1 tablet by mouth daily., Disp: , Rfl:     budesonide (PULMICORT) 0.5 MG/2ML neb solution, Take 2 mLs (0.5 mg) by nebulization 2 times daily, Disp: 2 mL, Rfl: 3    cyanocolbalamin (VITAMIN B-12) 1000 MCG tablet, Take 500 mcg by mouth daily As directed, Disp: , Rfl:     fluticasone-salmeterol (ADVAIR) 250-50 MCG/ACT inhaler, INHALE 1 DOSE BY MOUTH TWICE DAILY, Disp: 60 each, Rfl: 8    furosemide (LASIX) 20 MG tablet, Take 2 tablets (40 mg) by mouth 2 times daily, Disp: 90 tablet, Rfl: 1    hydrALAZINE (APRESOLINE) 10 MG tablet, Take 3 tablets (30 mg) by mouth 3 times daily, Disp: 270 tablet, Rfl: 3    isosorbide mononitrate (ISMO/MONOKET) 20 MG tablet, Take 2 tablets (40 mg) by mouth 3 times daily, Disp: 540 tablet, Rfl: 3    metoprolol tartrate (LOPRESSOR) 100 MG tablet, Take 1 tablet (100 mg) by mouth 2 times daily Patient needs to see primary provider and have labs for further refills. Please call for an appointment at [phone]., Disp: 180 tablet, Rfl: 0    Multiple Vitamins-Minerals (PRESERVISION AREDS 2+MULTI VIT PO), Take by mouth 2 times daily, Disp: , Rfl:     omeprazole (PRILOSEC) 40 MG DR capsule, Take 1 capsule (40 mg) by mouth daily, Disp: 90 capsule, Rfl: 3    potassium chloride ER (K-TAB/KLOR-CON) 10 MEQ CR tablet, TAKE 2 TABLETS BY MOUTH IN THE MORNING AND 1 IN THE EVENING, Disp: 270 tablet, Rfl: 3    pravastatin (PRAVACHOL) 80 MG tablet, Take 1 tablet (80 mg) by mouth daily, Disp: 90  tablet, Rfl: 3    timolol maleate (TIMOPTIC) 0.5 % ophthalmic solution, INSTILL 1 DROP INTO EACH EYE ONCE DAILY IN THE MORNING, Disp: , Rfl:     VITAMIN D3 25 MCG (1000 UT) tablet, TAKE 1 TABLET BY MOUTH ONCE DAILY **DUE  TO  BE  SEEN  FOR  MORE  REFILLS**, Disp: 90 tablet, Rfl: 3        Vital signs reviewed by Feliberto Carter PA-C  /63   Pulse 87   Temp 97  F (36.1  C) (Tympanic)   Resp 17   Wt 73.5 kg (162 lb)   SpO2 95%   BMI 26.47 kg/m      Physical Exam     Physical Exam  Vitals and nursing note reviewed.   Constitutional:       General: She is not in acute distress.     Appearance: She is well-developed. She is not ill-appearing, toxic-appearing or diaphoretic.   HENT:      Head: Normocephalic and atraumatic.      Right Ear: Tympanic membrane and external ear normal. No drainage, swelling or tenderness. Tympanic membrane is not perforated, erythematous, retracted or bulging.      Left Ear: Tympanic membrane and external ear normal. No drainage, swelling or tenderness. Tympanic membrane is not perforated, erythematous, retracted or bulging.      Nose: No mucosal edema, congestion or rhinorrhea.      Right Sinus: No maxillary sinus tenderness or frontal sinus tenderness.      Left Sinus: No maxillary sinus tenderness or frontal sinus tenderness.      Mouth/Throat:      Pharynx: No pharyngeal swelling, oropharyngeal exudate, posterior oropharyngeal erythema or uvula swelling.      Tonsils: No tonsillar abscesses.   Eyes:      Pupils: Pupils are equal, round, and reactive to light.   Neck:      Trachea: Trachea normal.   Cardiovascular:      Rate and Rhythm: Normal rate and regular rhythm.      Pulses:           Dorsalis pedis pulses are 2+ on the right side and 2+ on the left side.        Posterior tibial pulses are 2+ on the right side and 2+ on the left side.      Heart sounds: Normal heart sounds, S1 normal and S2 normal. No murmur heard.     No friction rub. No gallop.   Pulmonary:      Effort:  Pulmonary effort is normal. No respiratory distress.      Breath sounds: Normal breath sounds. No decreased breath sounds, wheezing, rhonchi or rales.   Abdominal:      General: Bowel sounds are normal. There is no distension.      Palpations: Abdomen is soft. Abdomen is not rigid. There is no mass.      Tenderness: There is no abdominal tenderness. There is no guarding or rebound.   Musculoskeletal:         General: Normal range of motion.      Cervical back: Full passive range of motion without pain, normal range of motion and neck supple.        Legs:    Lymphadenopathy:      Cervical: No cervical adenopathy.   Skin:     General: Skin is warm and dry.      Capillary Refill: Capillary refill takes less than 2 seconds.   Neurological:      Mental Status: She is alert and oriented to person, place, and time.      Cranial Nerves: No cranial nerve deficit.      Deep Tendon Reflexes: Reflexes are normal and symmetric.   Psychiatric:         Behavior: Behavior normal. Behavior is cooperative.         Thought Content: Thought content normal.         Judgment: Judgment normal.             Feliberto Carter PA-C  7/13/2023, 10:47 AM

## 2023-07-13 NOTE — TELEPHONE ENCOUNTER
[facility] Call [facility]    Phone Message    May a detailed message be left on voicemail: yes     Reason for Call: Other: Tessa called requesting to speak with a member of her care team about her upcoming procedure on Monday. Tessa went to urgent care this morning regarding a blood clot in her leg and is curious if this affects her ability to have her procedure on Monday. She was also informed to hold her medication for two days prior to her procedure but was wondering about Monday morning if she should take her medication or not. Please reach out to Tessa to discuss. Thank you!     Action Taken: Other: Cardiology    Travel Screening: Not Applicable     Thank you!  [facility]

## 2023-07-17 ENCOUNTER — HOSPITAL ENCOUNTER (OUTPATIENT)
Facility: CLINIC | Age: 88
Discharge: HOME | DRG: 982 | End: 2023-07-17
Attending: INTERNAL MEDICINE | Admitting: INTERNAL MEDICINE
Payer: COMMERCIAL

## 2023-07-17 ENCOUNTER — APPOINTMENT (OUTPATIENT)
Dept: MEDSURG UNIT | Facility: CLINIC | Age: 88
DRG: 982 | End: 2023-07-17
Attending: INTERNAL MEDICINE
Payer: COMMERCIAL

## 2023-07-17 ENCOUNTER — APPOINTMENT (OUTPATIENT)
Dept: LAB | Facility: CLINIC | Age: 88
DRG: 982 | End: 2023-07-17
Attending: INTERNAL MEDICINE
Payer: COMMERCIAL

## 2023-07-17 ENCOUNTER — HOSPITAL ENCOUNTER (OUTPATIENT)
Dept: CARDIOLOGY | Facility: CLINIC | Age: 88
Discharge: HOME | DRG: 982 | End: 2023-07-17
Attending: CASE MANAGER/CARE COORDINATOR | Admitting: INTERNAL MEDICINE
Payer: COMMERCIAL

## 2023-07-17 VITALS
HEART RATE: 61 BPM | SYSTOLIC BLOOD PRESSURE: 150 MMHG | OXYGEN SATURATION: 97 % | RESPIRATION RATE: 15 BRPM | DIASTOLIC BLOOD PRESSURE: 73 MMHG

## 2023-07-17 DIAGNOSIS — I21.4 NSTEMI (NON-ST ELEVATED MYOCARDIAL INFARCTION) (H): ICD-10-CM

## 2023-07-17 DIAGNOSIS — I50.33 ACUTE ON CHRONIC DIASTOLIC (CONGESTIVE) HEART FAILURE (H): ICD-10-CM

## 2023-07-17 DIAGNOSIS — I25.118 CORONARY ARTERY DISEASE OF NATIVE ARTERY OF NATIVE HEART WITH STABLE ANGINA PECTORIS (H): ICD-10-CM

## 2023-07-17 DIAGNOSIS — I12.9 RENAL HYPERTENSION: ICD-10-CM

## 2023-07-17 DIAGNOSIS — R07.9 CHEST PAIN, UNSPECIFIED TYPE: ICD-10-CM

## 2023-07-17 LAB
ACT BLD: 409 SECONDS (ref 74–150)
ANION GAP SERPL CALCULATED.3IONS-SCNC: 14 MMOL/L (ref 7–15)
APTT PPP: 33 SECONDS (ref 22–38)
BUN SERPL-MCNC: 47.1 MG/DL (ref 8–23)
CALCIUM SERPL-MCNC: 9.2 MG/DL (ref 8.8–10.2)
CHLORIDE SERPL-SCNC: 107 MMOL/L (ref 98–107)
CREAT SERPL-MCNC: 2.23 MG/DL (ref 0.51–0.95)
DEPRECATED HCO3 PLAS-SCNC: 22 MMOL/L (ref 22–29)
ERYTHROCYTE [DISTWIDTH] IN BLOOD BY AUTOMATED COUNT: 15 % (ref 10–15)
GFR SERPL CREATININE-BSD FRML MDRD: 21 ML/MIN/1.73M2
GLUCOSE SERPL-MCNC: 97 MG/DL (ref 70–99)
HCT VFR BLD AUTO: 36.3 % (ref 35–47)
HGB BLD-MCNC: 11 G/DL (ref 11.7–15.7)
INR PPP: 1.11 (ref 0.85–1.15)
LVEF ECHO: NORMAL
MCH RBC QN AUTO: 30.5 PG (ref 26.5–33)
MCHC RBC AUTO-ENTMCNC: 30.3 G/DL (ref 31.5–36.5)
MCV RBC AUTO: 101 FL (ref 78–100)
PLATELET # BLD AUTO: 99 10E3/UL (ref 150–450)
POTASSIUM SERPL-SCNC: 4.1 MMOL/L (ref 3.4–5.3)
RBC # BLD AUTO: 3.61 10E6/UL (ref 3.8–5.2)
SODIUM SERPL-SCNC: 143 MMOL/L (ref 136–145)
WBC # BLD AUTO: 6.7 10E3/UL (ref 4–11)

## 2023-07-17 PROCEDURE — 258N000003 HC RX IP 258 OP 636: Performed by: CASE MANAGER/CARE COORDINATOR

## 2023-07-17 PROCEDURE — 85347 COAGULATION TIME ACTIVATED: CPT

## 2023-07-17 PROCEDURE — C1725 CATH, TRANSLUMIN NON-LASER: HCPCS | Performed by: INTERNAL MEDICINE

## 2023-07-17 PROCEDURE — C1760 CLOSURE DEV, VASC: HCPCS | Performed by: INTERNAL MEDICINE

## 2023-07-17 PROCEDURE — C1769 GUIDE WIRE: HCPCS | Performed by: INTERNAL MEDICINE

## 2023-07-17 PROCEDURE — 93010 ELECTROCARDIOGRAM REPORT: CPT | Mod: 59 | Performed by: INTERNAL MEDICINE

## 2023-07-17 PROCEDURE — 99152 MOD SED SAME PHYS/QHP 5/>YRS: CPT | Performed by: INTERNAL MEDICINE

## 2023-07-17 PROCEDURE — C1894 INTRO/SHEATH, NON-LASER: HCPCS | Performed by: INTERNAL MEDICINE

## 2023-07-17 PROCEDURE — 250N000011 HC RX IP 250 OP 636: Performed by: INTERNAL MEDICINE

## 2023-07-17 PROCEDURE — C9600 PERC DRUG-EL COR STENT SING: HCPCS | Performed by: INTERNAL MEDICINE

## 2023-07-17 PROCEDURE — 93454 CORONARY ARTERY ANGIO S&I: CPT | Mod: 26 | Performed by: INTERNAL MEDICINE

## 2023-07-17 PROCEDURE — 250N000011 HC RX IP 250 OP 636: Mod: JZ | Performed by: INTERNAL MEDICINE

## 2023-07-17 PROCEDURE — 999N000054 HC STATISTIC EKG NON-CHARGEABLE

## 2023-07-17 PROCEDURE — 99153 MOD SED SAME PHYS/QHP EA: CPT | Performed by: INTERNAL MEDICINE

## 2023-07-17 PROCEDURE — 93454 CORONARY ARTERY ANGIO S&I: CPT | Mod: XU | Performed by: INTERNAL MEDICINE

## 2023-07-17 PROCEDURE — 272N000001 HC OR GENERAL SUPPLY STERILE: Performed by: INTERNAL MEDICINE

## 2023-07-17 PROCEDURE — 999N000132 HC STATISTIC PP CARE STAGE 1

## 2023-07-17 PROCEDURE — 36415 COLL VENOUS BLD VENIPUNCTURE: CPT | Performed by: CASE MANAGER/CARE COORDINATOR

## 2023-07-17 PROCEDURE — 80048 BASIC METABOLIC PNL TOTAL CA: CPT | Performed by: CASE MANAGER/CARE COORDINATOR

## 2023-07-17 PROCEDURE — 93306 TTE W/DOPPLER COMPLETE: CPT | Mod: 26 | Performed by: STUDENT IN AN ORGANIZED HEALTH CARE EDUCATION/TRAINING PROGRAM

## 2023-07-17 PROCEDURE — 999N000248 HC STATISTIC IV INSERT WITH US BY RN

## 2023-07-17 PROCEDURE — 85610 PROTHROMBIN TIME: CPT | Performed by: CASE MANAGER/CARE COORDINATOR

## 2023-07-17 PROCEDURE — 250N000013 HC RX MED GY IP 250 OP 250 PS 637: Performed by: INTERNAL MEDICINE

## 2023-07-17 PROCEDURE — C1887 CATHETER, GUIDING: HCPCS | Performed by: INTERNAL MEDICINE

## 2023-07-17 PROCEDURE — 92928 PRQ TCAT PLMT NTRAC ST 1 LES: CPT | Mod: RC | Performed by: INTERNAL MEDICINE

## 2023-07-17 PROCEDURE — 250N000009 HC RX 250: Performed by: INTERNAL MEDICINE

## 2023-07-17 PROCEDURE — 85730 THROMBOPLASTIN TIME PARTIAL: CPT | Performed by: CASE MANAGER/CARE COORDINATOR

## 2023-07-17 PROCEDURE — 93005 ELECTROCARDIOGRAM TRACING: CPT

## 2023-07-17 PROCEDURE — C1874 STENT, COATED/COV W/DEL SYS: HCPCS | Performed by: INTERNAL MEDICINE

## 2023-07-17 PROCEDURE — 93306 TTE W/DOPPLER COMPLETE: CPT

## 2023-07-17 PROCEDURE — 85027 COMPLETE CBC AUTOMATED: CPT | Performed by: CASE MANAGER/CARE COORDINATOR

## 2023-07-17 PROCEDURE — 999N000134 HC STATISTIC PP CARE STAGE 3

## 2023-07-17 DEVICE — STENT CORONARY DES SYNERGY XD MR US 2.75X16MM H7493941816270: Type: IMPLANTABLE DEVICE | Status: FUNCTIONAL

## 2023-07-17 DEVICE — CLOSURE ANGIOSEAL 6FR 610130: Type: IMPLANTABLE DEVICE | Status: FUNCTIONAL

## 2023-07-17 RX ORDER — NALOXONE HYDROCHLORIDE 0.4 MG/ML
0.2 INJECTION, SOLUTION INTRAMUSCULAR; INTRAVENOUS; SUBCUTANEOUS
Status: DISCONTINUED | OUTPATIENT
Start: 2023-07-17 | End: 2023-07-17 | Stop reason: HOSPADM

## 2023-07-17 RX ORDER — FENTANYL CITRATE 50 UG/ML
25 INJECTION, SOLUTION INTRAMUSCULAR; INTRAVENOUS
Status: DISCONTINUED | OUTPATIENT
Start: 2023-07-17 | End: 2023-07-17 | Stop reason: HOSPADM

## 2023-07-17 RX ORDER — LIDOCAINE 40 MG/G
CREAM TOPICAL
Status: DISCONTINUED | OUTPATIENT
Start: 2023-07-17 | End: 2023-07-17 | Stop reason: HOSPADM

## 2023-07-17 RX ORDER — ONDANSETRON 4 MG/1
4 TABLET, ORALLY DISINTEGRATING ORAL EVERY 6 HOURS PRN
Status: DISCONTINUED | OUTPATIENT
Start: 2023-07-17 | End: 2023-07-17 | Stop reason: HOSPADM

## 2023-07-17 RX ORDER — ASPIRIN 81 MG/1
81 TABLET, CHEWABLE ORAL ONCE
Status: DISCONTINUED | OUTPATIENT
Start: 2023-07-17 | End: 2023-07-17 | Stop reason: HOSPADM

## 2023-07-17 RX ORDER — POTASSIUM CHLORIDE 750 MG/1
20 TABLET, EXTENDED RELEASE ORAL
Status: DISCONTINUED | OUTPATIENT
Start: 2023-07-17 | End: 2023-07-17 | Stop reason: HOSPADM

## 2023-07-17 RX ORDER — HYDRALAZINE HYDROCHLORIDE 20 MG/ML
10 INJECTION INTRAMUSCULAR; INTRAVENOUS EVERY 4 HOURS PRN
Status: DISCONTINUED | OUTPATIENT
Start: 2023-07-17 | End: 2023-07-17 | Stop reason: HOSPADM

## 2023-07-17 RX ORDER — SODIUM CHLORIDE 9 MG/ML
INJECTION, SOLUTION INTRAVENOUS CONTINUOUS
Status: DISCONTINUED | OUTPATIENT
Start: 2023-07-17 | End: 2023-07-17 | Stop reason: HOSPADM

## 2023-07-17 RX ORDER — ASPIRIN 81 MG/1
81 TABLET ORAL DAILY
Qty: 90 TABLET | Refills: 3 | Status: ON HOLD | OUTPATIENT
Start: 2023-07-18 | End: 2023-07-23

## 2023-07-17 RX ORDER — METOPROLOL TARTRATE 1 MG/ML
5 INJECTION, SOLUTION INTRAVENOUS
Status: DISCONTINUED | OUTPATIENT
Start: 2023-07-17 | End: 2023-07-17 | Stop reason: HOSPADM

## 2023-07-17 RX ORDER — NITROGLYCERIN 5 MG/ML
VIAL (ML) INTRAVENOUS
Status: DISCONTINUED | OUTPATIENT
Start: 2023-07-17 | End: 2023-07-17 | Stop reason: HOSPADM

## 2023-07-17 RX ORDER — OXYCODONE HYDROCHLORIDE 5 MG/1
10 TABLET ORAL EVERY 4 HOURS PRN
Status: DISCONTINUED | OUTPATIENT
Start: 2023-07-17 | End: 2023-07-17 | Stop reason: HOSPADM

## 2023-07-17 RX ORDER — ATORVASTATIN CALCIUM 80 MG/1
80 TABLET, FILM COATED ORAL DAILY
Qty: 90 TABLET | Refills: 3 | Status: SHIPPED | OUTPATIENT
Start: 2023-07-17 | End: 2023-07-17

## 2023-07-17 RX ORDER — ONDANSETRON 2 MG/ML
4 INJECTION INTRAMUSCULAR; INTRAVENOUS EVERY 6 HOURS PRN
Status: DISCONTINUED | OUTPATIENT
Start: 2023-07-17 | End: 2023-07-17 | Stop reason: HOSPADM

## 2023-07-17 RX ORDER — ASPIRIN 325 MG
325 TABLET ORAL ONCE
Status: COMPLETED | OUTPATIENT
Start: 2023-07-17 | End: 2023-07-17

## 2023-07-17 RX ORDER — OXYCODONE HYDROCHLORIDE 5 MG/1
5 TABLET ORAL EVERY 4 HOURS PRN
Status: DISCONTINUED | OUTPATIENT
Start: 2023-07-17 | End: 2023-07-17 | Stop reason: HOSPADM

## 2023-07-17 RX ORDER — FENTANYL CITRATE 50 UG/ML
INJECTION, SOLUTION INTRAMUSCULAR; INTRAVENOUS
Status: DISCONTINUED | OUTPATIENT
Start: 2023-07-17 | End: 2023-07-17 | Stop reason: HOSPADM

## 2023-07-17 RX ORDER — NALOXONE HYDROCHLORIDE 0.4 MG/ML
0.4 INJECTION, SOLUTION INTRAMUSCULAR; INTRAVENOUS; SUBCUTANEOUS
Status: DISCONTINUED | OUTPATIENT
Start: 2023-07-17 | End: 2023-07-17 | Stop reason: HOSPADM

## 2023-07-17 RX ORDER — ROSUVASTATIN CALCIUM 20 MG/1
20 TABLET, COATED ORAL DAILY
Qty: 90 TABLET | Refills: 3 | Status: ON HOLD | OUTPATIENT
Start: 2023-07-17 | End: 2024-08-15

## 2023-07-17 RX ORDER — FLUMAZENIL 0.1 MG/ML
0.2 INJECTION, SOLUTION INTRAVENOUS
Status: DISCONTINUED | OUTPATIENT
Start: 2023-07-17 | End: 2023-07-17 | Stop reason: HOSPADM

## 2023-07-17 RX ORDER — POTASSIUM CHLORIDE 750 MG/1
40 TABLET, EXTENDED RELEASE ORAL
Status: DISCONTINUED | OUTPATIENT
Start: 2023-07-17 | End: 2023-07-17 | Stop reason: HOSPADM

## 2023-07-17 RX ORDER — HEPARIN SODIUM 1000 [USP'U]/ML
INJECTION, SOLUTION INTRAVENOUS; SUBCUTANEOUS
Status: DISCONTINUED | OUTPATIENT
Start: 2023-07-17 | End: 2023-07-17 | Stop reason: HOSPADM

## 2023-07-17 RX ORDER — ATROPINE SULFATE 0.1 MG/ML
0.5 INJECTION INTRAVENOUS
Status: DISCONTINUED | OUTPATIENT
Start: 2023-07-17 | End: 2023-07-17 | Stop reason: HOSPADM

## 2023-07-17 RX ORDER — HYDRALAZINE HYDROCHLORIDE 20 MG/ML
INJECTION INTRAMUSCULAR; INTRAVENOUS
Status: DISCONTINUED | OUTPATIENT
Start: 2023-07-17 | End: 2023-07-17 | Stop reason: HOSPADM

## 2023-07-17 RX ORDER — NITROGLYCERIN 0.4 MG/1
0.4 TABLET SUBLINGUAL EVERY 5 MIN PRN
Status: DISCONTINUED | OUTPATIENT
Start: 2023-07-17 | End: 2023-07-17 | Stop reason: HOSPADM

## 2023-07-17 RX ORDER — ASPIRIN 81 MG/1
243 TABLET, CHEWABLE ORAL ONCE
Status: COMPLETED | OUTPATIENT
Start: 2023-07-17 | End: 2023-07-17

## 2023-07-17 RX ORDER — ASPIRIN 81 MG/1
81 TABLET ORAL DAILY
Status: DISCONTINUED | OUTPATIENT
Start: 2023-07-18 | End: 2023-07-17 | Stop reason: HOSPADM

## 2023-07-17 RX ORDER — IOPAMIDOL 755 MG/ML
INJECTION, SOLUTION INTRAVASCULAR
Status: DISCONTINUED | OUTPATIENT
Start: 2023-07-17 | End: 2023-07-17 | Stop reason: HOSPADM

## 2023-07-17 RX ADMIN — SODIUM CHLORIDE: 9 INJECTION, SOLUTION INTRAVENOUS at 13:53

## 2023-07-17 ASSESSMENT — ACTIVITIES OF DAILY LIVING (ADL)
ADLS_ACUITY_SCORE: 37

## 2023-07-17 NOTE — PROGRESS NOTES
D/I/A: Pt roomed on 3C in [address].  Arrived via litter and accompanied by CCL RN On/Off: On monitor.  VSSA.  Rhythm upon arrival SR on monitor.  Denies pain or sob.  Reviewed activity restrictions and when to notify RN, ie-changes to breathing or increased chest pressure or chest pain.  CCL access: right femoral artery with angio seal, no bleeding or hematoma. EKG completed.   P: Continue to monitor status.  Discharge to home once meeting criteria.        Clarified with MD and pharmacy that discharge prescription for Brilinta is supposed to be for 7 days only.

## 2023-07-17 NOTE — Clinical Note
The first balloon was inserted into the right coronary artery.Max pressure = 12 chandan. Total duration = 6 seconds.     Max pressure = 12 chandan. Total duration = 10 seconds.

## 2023-07-17 NOTE — DISCHARGE INSTRUCTIONS
Going Home after an Angioplasty or Stent Placement (Cardiac)  ______________________________________________      After you go home:  Have an adult stay with you for 24 hours.  Drink plenty of fluids.  You may eat your normal diet, unless your doctor tells you otherwise.  For 24 hours:  Relax and take it easy.  Do NOT smoke.  Do NOT make any important or legal decisions.  Do NOT drive or operate machines at home or at work.  Do NOT drink alcohol.  Remove the Band-Aid after 24 hours. If there is minor oozing, apply another Band-aid and remove it after 12 hours.  For 2 days, do NOT have sex or do any heavy exercise.  Do NOT take a bath, or use a hot tub or pool for at least 3 days. You may shower.    Care of groin site  It is normal to have a small bruise or lump at the site.  Do not scrub the site.  For the first 2 days: Do not stoop or squat. When you cough, sneeze or move your bowels, hold your hand over the puncture site and press gently.  Do not lift more than 10 pounds for at least 3 to 5 days.  Do not use lotion or powder near the puncture site for 3 days.    If you start bleeding from the site in your groin, lie down flat and press firmly  on the site. Call your doctor as soon as you can.      Medicines  If you have started taking Plavix or Effient, do not stop taking it until you talk to your heart doctor (cardiologist).  If you are on metformin (Glucophage), do not restart it until you have blood tests (within 2 to 3 days after discharge). When your doctor tells you it is safe, you may restart the metformin.  If you have stopped any other medicines, check with your nurse or provider about when to restart them.    Call 911 right away if you have bleeding that is heavy or does not stop.    Call your doctor if:  You have a large or growing hard lump around the site.  The site is red, swollen, hot or tender.  Blood or fluid is draining from the site.  You have chills or a fever greater than 101 F (38 C).  Your  leg or arm feels numb or cool.  You have hives, a rash or unusual itching.      [facility] [place]:  [phone] (7 days a week)

## 2023-07-17 NOTE — PROGRESS NOTES
Pt admitted to  for a CORS.  Consent and H and P up to date.  Pt took aspirin 325mg last night and today prior to admission.  Pt stopped eliquist last Thursday. Vascular placed IV.  Pt has tearable brief on and groin prepped.   at bedside.

## 2023-07-17 NOTE — PROGRESS NOTES
D/I/A:  Patient is tolerating liquids and foods, ambulating, urinating, puncture sites are stable ( no bleeding and no hematoma) and patient has a .  A+O x4 and making needs known.  CCL access sites C/D/I; no bleeding or hematoma; CMS intact.  VSSA. .  IV access removed.  Education completed and outlined in AVS or handout: medications reviewed with patient by Elaine ARGUELLO (RN)   Questions answered  BY Elaine ARGUELLO (RN) prior to discharge.  Belongings returned to patient at discharge.    P: Discharged to self care.  Patient to follow up with appts as per discharge instruction.

## 2023-07-17 NOTE — Clinical Note
Stent deployed in the middle right coronary artery. Max pressure = 12 chandan. Total duration = 6 seconds. Balloon reinflated a second time: Max pressure = 12 chandan. Total duration = 6 seconds.

## 2023-07-17 NOTE — Clinical Note
The first balloon was inserted into the right coronary artery and middle right coronary artery.Max pressure = 12 chandan. Total duration = 4 seconds.     Max pressure = 12 chandan. Total duration = 6 seconds.    Balloon reinflated a second time: Max pressure = 12 chandan. Total duration = 6 seconds.

## 2023-07-18 ENCOUNTER — TELEPHONE (OUTPATIENT)
Dept: CARDIOLOGY | Facility: CLINIC | Age: 88
End: 2023-07-18
Payer: COMMERCIAL

## 2023-07-18 LAB
ATRIAL RATE - MUSE: 67 BPM
ATRIAL RATE - MUSE: 81 BPM
DIASTOLIC BLOOD PRESSURE - MUSE: NORMAL MMHG
DIASTOLIC BLOOD PRESSURE - MUSE: NORMAL MMHG
INTERPRETATION ECG - MUSE: NORMAL
INTERPRETATION ECG - MUSE: NORMAL
P AXIS - MUSE: 72 DEGREES
P AXIS - MUSE: 91 DEGREES
PR INTERVAL - MUSE: 224 MS
PR INTERVAL - MUSE: 282 MS
QRS DURATION - MUSE: 78 MS
QRS DURATION - MUSE: 94 MS
QT - MUSE: 394 MS
QT - MUSE: 436 MS
QTC - MUSE: 457 MS
QTC - MUSE: 460 MS
R AXIS - MUSE: 13 DEGREES
R AXIS - MUSE: 21 DEGREES
SYSTOLIC BLOOD PRESSURE - MUSE: NORMAL MMHG
SYSTOLIC BLOOD PRESSURE - MUSE: NORMAL MMHG
T AXIS - MUSE: 56 DEGREES
T AXIS - MUSE: 61 DEGREES
VENTRICULAR RATE- MUSE: 67 BPM
VENTRICULAR RATE- MUSE: 81 BPM

## 2023-07-18 NOTE — TELEPHONE ENCOUNTER
S-(situation): patient had PCI of distal RCA yesterday. She states she feels fatigued today but otherwise is doing well.   1. Right dominance   2. 3V diease   3. Distal RCA s/p prior stents with 80% ISR  4. RPDA with  to 65% stenosis   5. Prox LAD s/p prior stents x2 with 40 and 50% ISR and diffuse disease in mid to distal LAD segment   Right femoral artery used for access. Flat and dry, reviewed activity restrictions. She had some questions about her medications.She is on triple therapy (Eliquis, ASA, and Brilinta) Aspirin 81 mg was listed twice and she is correct in thinking that she only needs it once daily. Brilinta new at discharge, discussed purpose and side effect. Her pravastatin was dc'd and Rosuvastatin was added for better lipid control.  Cardiac rehab has called, she has yet to make an appointment     B-(background): Ms. Tessa Mansfield is a pleasant 88 year old female with medical history pertinent for CAD (PCI at all 3 vessels at various times from [id number], known 80% ISR of LCx), sick sinus syndrome s/p dual-chamber PPM, AF here for coronary angiography due to persistent HIGGINS.    A-(assessment): stable post cath    R-(recommendations): follow up with NP, check on lasix dosage. Find out when and if  she should discontinue her ASA.

## 2023-07-19 ENCOUNTER — NURSE TRIAGE (OUTPATIENT)
Dept: CARDIOLOGY | Facility: CLINIC | Age: 88
End: 2023-07-19
Payer: COMMERCIAL

## 2023-07-19 ENCOUNTER — HOSPITAL ENCOUNTER (INPATIENT)
Facility: CLINIC | Age: 88
LOS: 4 days | Discharge: HOME | DRG: 982 | End: 2023-07-23
Attending: FAMILY MEDICINE | Admitting: STUDENT IN AN ORGANIZED HEALTH CARE EDUCATION/TRAINING PROGRAM
Payer: COMMERCIAL

## 2023-07-19 ENCOUNTER — TELEPHONE (OUTPATIENT)
Dept: CARDIOLOGY | Facility: CLINIC | Age: 88
End: 2023-07-19

## 2023-07-19 DIAGNOSIS — N18.4 CKD (CHRONIC KIDNEY DISEASE) STAGE 4, GFR 15-29 ML/MIN (H): ICD-10-CM

## 2023-07-19 DIAGNOSIS — R53.1 GENERAL WEAKNESS: ICD-10-CM

## 2023-07-19 DIAGNOSIS — Z79.01 LONG TERM (CURRENT) USE OF ANTICOAGULANTS: ICD-10-CM

## 2023-07-19 DIAGNOSIS — K92.1 MELENA: ICD-10-CM

## 2023-07-19 DIAGNOSIS — R07.9 CHEST PAIN, UNSPECIFIED TYPE: ICD-10-CM

## 2023-07-19 DIAGNOSIS — Z95.5 S/P CORONARY ARTERY STENT PLACEMENT: ICD-10-CM

## 2023-07-19 DIAGNOSIS — Z79.02 ANTIPLATELET OR ANTITHROMBOTIC LONG-TERM USE: ICD-10-CM

## 2023-07-19 DIAGNOSIS — D64.9 ANEMIA, UNSPECIFIED TYPE: ICD-10-CM

## 2023-07-19 LAB
ABO/RH(D): NORMAL
ALBUMIN SERPL BCG-MCNC: 4.1 G/DL (ref 3.5–5.2)
ALP SERPL-CCNC: 74 U/L (ref 35–104)
ALT SERPL W P-5'-P-CCNC: 11 U/L (ref 0–50)
ANION GAP SERPL CALCULATED.3IONS-SCNC: 14 MMOL/L (ref 7–15)
ANTIBODY SCREEN: NEGATIVE
APTT PPP: 40 SECONDS (ref 22–38)
AST SERPL W P-5'-P-CCNC: 31 U/L (ref 0–45)
BASOPHILS # BLD MANUAL: 0 10E3/UL (ref 0–0.2)
BASOPHILS NFR BLD MANUAL: 0 %
BILIRUB DIRECT SERPL-MCNC: 0.21 MG/DL (ref 0–0.3)
BILIRUB SERPL-MCNC: 0.5 MG/DL
BUN SERPL-MCNC: 48.9 MG/DL (ref 8–23)
CALCIUM SERPL-MCNC: 8.8 MG/DL (ref 8.8–10.2)
CHLORIDE SERPL-SCNC: 107 MMOL/L (ref 98–107)
CREAT SERPL-MCNC: 2.21 MG/DL (ref 0.51–0.95)
DEPRECATED HCO3 PLAS-SCNC: 21 MMOL/L (ref 22–29)
EOSINOPHIL # BLD MANUAL: 0 10E3/UL (ref 0–0.7)
EOSINOPHIL NFR BLD MANUAL: 0 %
ERYTHROCYTE [DISTWIDTH] IN BLOOD BY AUTOMATED COUNT: 15.1 % (ref 10–15)
GFR SERPL CREATININE-BSD FRML MDRD: 21 ML/MIN/1.73M2
GLUCOSE SERPL-MCNC: 102 MG/DL (ref 70–99)
HCT VFR BLD AUTO: 29.1 % (ref 35–47)
HGB BLD-MCNC: 8 G/DL (ref 11.7–15.7)
HGB BLD-MCNC: 8.9 G/DL (ref 11.7–15.7)
HOLD SPECIMEN: NORMAL
INR PPP: 1.42 (ref 0.85–1.15)
LYMPHOCYTES # BLD MANUAL: 2.3 10E3/UL (ref 0.8–5.3)
LYMPHOCYTES NFR BLD MANUAL: 27 %
MCH RBC QN AUTO: 30.1 PG (ref 26.5–33)
MCHC RBC AUTO-ENTMCNC: 30.6 G/DL (ref 31.5–36.5)
MCV RBC AUTO: 98 FL (ref 78–100)
METAMYELOCYTES # BLD MANUAL: 0.1 10E3/UL
METAMYELOCYTES NFR BLD MANUAL: 1 %
MONOCYTES # BLD MANUAL: 1.4 10E3/UL (ref 0–1.3)
MONOCYTES NFR BLD MANUAL: 16 %
MYELOCYTES # BLD MANUAL: 0.1 10E3/UL
MYELOCYTES NFR BLD MANUAL: 1 %
NEUTROPHILS # BLD MANUAL: 4.7 10E3/UL (ref 1.6–8.3)
NEUTROPHILS NFR BLD MANUAL: 55 %
NRBC # BLD AUTO: 0.1 10E3/UL
NRBC BLD MANUAL-RTO: 1 %
PLAT MORPH BLD: ABNORMAL
PLATELET # BLD AUTO: 89 10E3/UL (ref 150–450)
POTASSIUM SERPL-SCNC: 3.9 MMOL/L (ref 3.4–5.3)
PROT SERPL-MCNC: 6.6 G/DL (ref 6.4–8.3)
RBC # BLD AUTO: 2.96 10E6/UL (ref 3.8–5.2)
RBC MORPH BLD: ABNORMAL
SODIUM SERPL-SCNC: 142 MMOL/L (ref 136–145)
SPECIMEN EXPIRATION DATE: NORMAL
WBC # BLD AUTO: 8.6 10E3/UL (ref 4–11)

## 2023-07-19 PROCEDURE — 86901 BLOOD TYPING SEROLOGIC RH(D): CPT | Performed by: FAMILY MEDICINE

## 2023-07-19 PROCEDURE — 250N000011 HC RX IP 250 OP 636: Mod: JZ | Performed by: FAMILY MEDICINE

## 2023-07-19 PROCEDURE — 120N000002 HC R&B MED SURG/OB UMMC

## 2023-07-19 PROCEDURE — 99285 EMERGENCY DEPT VISIT HI MDM: CPT | Mod: 25 | Performed by: FAMILY MEDICINE

## 2023-07-19 PROCEDURE — 36415 COLL VENOUS BLD VENIPUNCTURE: CPT

## 2023-07-19 PROCEDURE — 99222 1ST HOSP IP/OBS MODERATE 55: CPT | Mod: GC | Performed by: INTERNAL MEDICINE

## 2023-07-19 PROCEDURE — 85027 COMPLETE CBC AUTOMATED: CPT | Performed by: FAMILY MEDICINE

## 2023-07-19 PROCEDURE — 86850 RBC ANTIBODY SCREEN: CPT | Performed by: FAMILY MEDICINE

## 2023-07-19 PROCEDURE — C9113 INJ PANTOPRAZOLE SODIUM, VIA: HCPCS | Mod: JZ

## 2023-07-19 PROCEDURE — 250N000013 HC RX MED GY IP 250 OP 250 PS 637: Performed by: STUDENT IN AN ORGANIZED HEALTH CARE EDUCATION/TRAINING PROGRAM

## 2023-07-19 PROCEDURE — 85007 BL SMEAR W/DIFF WBC COUNT: CPT | Performed by: FAMILY MEDICINE

## 2023-07-19 PROCEDURE — 85610 PROTHROMBIN TIME: CPT | Performed by: FAMILY MEDICINE

## 2023-07-19 PROCEDURE — 86923 COMPATIBILITY TEST ELECTRIC: CPT

## 2023-07-19 PROCEDURE — 250N000011 HC RX IP 250 OP 636: Mod: JZ

## 2023-07-19 PROCEDURE — 36415 COLL VENOUS BLD VENIPUNCTURE: CPT | Performed by: EMERGENCY MEDICINE

## 2023-07-19 PROCEDURE — 82248 BILIRUBIN DIRECT: CPT | Performed by: FAMILY MEDICINE

## 2023-07-19 PROCEDURE — 85730 THROMBOPLASTIN TIME PARTIAL: CPT | Performed by: FAMILY MEDICINE

## 2023-07-19 PROCEDURE — 96374 THER/PROPH/DIAG INJ IV PUSH: CPT | Performed by: FAMILY MEDICINE

## 2023-07-19 PROCEDURE — 84295 ASSAY OF SERUM SODIUM: CPT | Performed by: FAMILY MEDICINE

## 2023-07-19 PROCEDURE — C9113 INJ PANTOPRAZOLE SODIUM, VIA: HCPCS | Mod: JZ | Performed by: FAMILY MEDICINE

## 2023-07-19 PROCEDURE — 96361 HYDRATE IV INFUSION ADD-ON: CPT | Performed by: FAMILY MEDICINE

## 2023-07-19 PROCEDURE — 36415 COLL VENOUS BLD VENIPUNCTURE: CPT | Performed by: FAMILY MEDICINE

## 2023-07-19 PROCEDURE — 250N000009 HC RX 250

## 2023-07-19 PROCEDURE — 258N000003 HC RX IP 258 OP 636: Performed by: FAMILY MEDICINE

## 2023-07-19 PROCEDURE — 99285 EMERGENCY DEPT VISIT HI MDM: CPT | Performed by: FAMILY MEDICINE

## 2023-07-19 PROCEDURE — 999N000157 HC STATISTIC RCP TIME EA 10 MIN

## 2023-07-19 PROCEDURE — 85018 HEMOGLOBIN: CPT

## 2023-07-19 PROCEDURE — 94640 AIRWAY INHALATION TREATMENT: CPT

## 2023-07-19 RX ORDER — ALLOPURINOL 100 MG/1
100 TABLET ORAL DAILY
Status: DISCONTINUED | OUTPATIENT
Start: 2023-07-20 | End: 2023-07-23 | Stop reason: HOSPADM

## 2023-07-19 RX ORDER — UREA 10 %
500 LOTION (ML) TOPICAL DAILY
Status: DISCONTINUED | OUTPATIENT
Start: 2023-07-20 | End: 2023-07-23 | Stop reason: HOSPADM

## 2023-07-19 RX ORDER — SODIUM CHLORIDE 9 MG/ML
INJECTION, SOLUTION INTRAVENOUS CONTINUOUS
Status: DISCONTINUED | OUTPATIENT
Start: 2023-07-19 | End: 2023-07-23 | Stop reason: HOSPADM

## 2023-07-19 RX ORDER — LIDOCAINE 40 MG/G
CREAM TOPICAL
Status: DISCONTINUED | OUTPATIENT
Start: 2023-07-19 | End: 2023-07-23 | Stop reason: HOSPADM

## 2023-07-19 RX ORDER — FLUTICASONE PROPIONATE AND SALMETEROL 250; 50 UG/1; UG/1
1 POWDER RESPIRATORY (INHALATION) 2 TIMES DAILY
Status: DISCONTINUED | OUTPATIENT
Start: 2023-07-19 | End: 2023-07-19

## 2023-07-19 RX ORDER — PROCHLORPERAZINE 25 MG
12.5 SUPPOSITORY, RECTAL RECTAL EVERY 12 HOURS PRN
Status: DISCONTINUED | OUTPATIENT
Start: 2023-07-19 | End: 2023-07-23 | Stop reason: HOSPADM

## 2023-07-19 RX ORDER — BUDESONIDE 0.5 MG/2ML
0.5 INHALANT ORAL 2 TIMES DAILY
Status: DISCONTINUED | OUTPATIENT
Start: 2023-07-19 | End: 2023-07-23 | Stop reason: HOSPADM

## 2023-07-19 RX ORDER — PROCHLORPERAZINE MALEATE 5 MG
5 TABLET ORAL EVERY 6 HOURS PRN
Status: DISCONTINUED | OUTPATIENT
Start: 2023-07-19 | End: 2023-07-23 | Stop reason: HOSPADM

## 2023-07-19 RX ORDER — AMOXICILLIN 250 MG
1 CAPSULE ORAL 2 TIMES DAILY PRN
Status: DISCONTINUED | OUTPATIENT
Start: 2023-07-19 | End: 2023-07-23 | Stop reason: HOSPADM

## 2023-07-19 RX ORDER — AMOXICILLIN 250 MG
2 CAPSULE ORAL 2 TIMES DAILY PRN
Status: DISCONTINUED | OUTPATIENT
Start: 2023-07-19 | End: 2023-07-23 | Stop reason: HOSPADM

## 2023-07-19 RX ORDER — FLUTICASONE FUROATE AND VILANTEROL 100; 25 UG/1; UG/1
1 POWDER RESPIRATORY (INHALATION) EVERY EVENING
Status: DISCONTINUED | OUTPATIENT
Start: 2023-07-19 | End: 2023-07-23 | Stop reason: HOSPADM

## 2023-07-19 RX ORDER — ROSUVASTATIN CALCIUM 10 MG/1
20 TABLET, COATED ORAL DAILY
Status: DISCONTINUED | OUTPATIENT
Start: 2023-07-20 | End: 2023-07-23 | Stop reason: HOSPADM

## 2023-07-19 RX ADMIN — FLUTICASONE FUROATE AND VILANTEROL TRIFENATATE 1 PUFF: 100; 25 POWDER RESPIRATORY (INHALATION) at 20:30

## 2023-07-19 RX ADMIN — PANTOPRAZOLE SODIUM 40 MG: 40 INJECTION, POWDER, FOR SOLUTION INTRAVENOUS at 11:48

## 2023-07-19 RX ADMIN — SODIUM CHLORIDE 100 ML/HR: 9 INJECTION, SOLUTION INTRAVENOUS at 22:02

## 2023-07-19 RX ADMIN — SODIUM CHLORIDE: 9 INJECTION, SOLUTION INTRAVENOUS at 11:49

## 2023-07-19 RX ADMIN — BUDESONIDE 0.5 MG: 0.5 INHALANT RESPIRATORY (INHALATION) at 19:31

## 2023-07-19 RX ADMIN — PANTOPRAZOLE SODIUM 40 MG: 40 INJECTION, POWDER, FOR SOLUTION INTRAVENOUS at 20:41

## 2023-07-19 ASSESSMENT — ACTIVITIES OF DAILY LIVING (ADL)
ADLS_ACUITY_SCORE: 28
ADLS_ACUITY_SCORE: 37
ADLS_ACUITY_SCORE: 28
ADLS_ACUITY_SCORE: 37
ADLS_ACUITY_SCORE: 37
ADLS_ACUITY_SCORE: 28
ADLS_ACUITY_SCORE: 37

## 2023-07-19 NOTE — ED PROVIDER NOTES
[facility] ED Provider Note  [facility]      History     Chief Complaint   Patient presents with     Rectal Bleeding     HPI  Tessa Mansfield is a 88 year old female who who presents with generalized weakness with blood in stool starting yesterday and today.  No previous history.  Patient recently had a stent placed I believe in her first diagonal with increasing occlusion because she was having chest pain etc. this was on Monday she started on Brilinta along with this Eliquis.  She is also on a baby aspirin.  Patient noted a brown stool with blood in it yesterday and then today black stool.  No abdominal pain may be some generalized weakness without presyncopal symptoms no chest pain or shortness of breath fevers or chills.  No other bleeding complaints at all no previous history of any GI bleeding disorders no history of liver disease etc.  Now presents here for evaluation with her .    Past Medical History  Past Medical History:   Diagnosis Date     CAD (coronary artery disease)      CAD s/p PCI+BMS to MRCA 10/2002, PCI to mLCX 2/2003, PCI+DESx2 to pLAD 11/2007, PCI+DESx1 to dRCA 12/2007, PCI+ALVAREZ to RPDA 7/2013; on indefinite DAPT  10/27/2002    10/27/2002: AMI s/p PCI+BMS (3.5x18mm Bx velocity) to mRCA 2/5/2003: Back pain; PCI+stent to mLCX c/b distal embolization/slow flow 4/11/2003: Unstable angina; PCI+stent (Hepacoat velocity) to mLAD 11/27/2007: PCI+DESx2 to pLAD (IVUS w/ calcification of LMCA and ulcerated plaque pLAD, 80% dRCA; also had 80% dLCX, too small to stent) 12/11/2007: Staged PCI. PCI+DESx1 (3.5x13mm Cypher) to dRCA (indefinite DAPT recommended at this time) 6/27/2008: Angina. mLCX stent 70% ISR. LAD and RCA stents patent. Myocardium at risk from LCX felt to be small, medical management preferred to PCI. 11/10/2009: Angina. Findings unchanged from 6/27/2008, FFR LAD 0.90. Medical management recommended. 7/23/2013: Unstable angina; PCI+ALVAREZ to mPDA. Diagnostic  "findings: LMCA 40% distal. LAD: pLAD stents patent mLAD 30% ISR dLAD 50% diffuse, D2 diffuse disease. LCX 80% mid ISR. RCA diffuse <30% mid, RPLAs diffuse disease, RPDA 100% occlusion.       Cardiac Pacemaker- Medtronic, dual chamber- NOT dependant 11/28/2007     CKD (chronic kidney disease), stage IV (H)      Hyperlipidemia LDL goal <70      Hypertension      Stented coronary artery 10-    RCA     Stented coronary artery 2-5-2003    LCx     Stented coronary artery 4-    LAD     Stented coronary artery 11-    LAD     Stented coronary artery 12-    RCA     Transient complete heart block (H) 11/28/2007     Past Surgical History:   Procedure Laterality Date     CHOLECYSTECTOMY       CV CORONARY ANGIOGRAM N/A 6/17/2022    Procedure: Coronary Angiogram;  Surgeon: Constantine Macias MD;  Location:  HEART CARDIAC CATH LAB     CV PCI N/A 6/17/2022    Procedure: Percutaneous Coronary Intervention;  Surgeon: Constantine Macias MD;  Location:  HEART CARDIAC CATH LAB     CV RIGHT HEART CATH MEASUREMENTS RECORDED N/A 6/17/2022    Procedure: Right Heart Catheterization;  Surgeon: Constantine Macias MD;  Location:  HEART CARDIAC CATH LAB     EP PACEMAKER N/A 10/15/2019    Procedure: EP PACEMAKER;  Surgeon: Anthony Zhu MD;  Location:  HEART CARDIAC CATH LAB     HC PPM INSERTION NEW/REPLACEMENT W/ ATRIAL&VENTRICULAR LEAD  11-     HYSTERECTOMY       No current outpatient medications on file.    Allergies   Allergen Reactions     Bactrim [Sulfamethoxazole W/Trimethoprim] Other (See Comments)     Patient unable to recall     Biaxin [Clarithromycin]      Chlorthalidone Nausea and Vomiting     Clonidine      Codeine Sulfate Itching     Darvon [Propoxyphene Hcl]      Dilaudid [Hydromorphone] Visual Disturbance     Hallucinations       Gabapentin Other (See Comments) and Fatigue     \"felt drunk\"     Indomethacin      Levaquin [Levofloxacin Hemihydrate]      Morphine Sulfate  "     Percocet [Oxycodone-Acetaminophen] Other (See Comments)     hallucinations     Pregabalin Fatigue     Pregabalin Itching     Other reaction(s): Fatigue     Shrimp Swelling     Spironolactone Other (See Comments)     Dehydrated       Terazosin      Ciprofloxacin Rash     Simvastatin Palpitations     Muscle weakness, leg cramping       Family History  Family History   Problem Relation Age of Onset     Cancer Sister 82        bladder cancer     Social History   Social History     Tobacco Use     Smoking status: Former     Packs/day: 0.30     Years: 15.00     Pack years: 4.50     Types: Cigarettes     Smokeless tobacco: Never     Tobacco comments:     Quit 22+ years ago   Substance Use Topics     Drug use: No         A medically appropriate review of systems was performed with pertinent positives and negatives noted in the HPI, and all other systems negative.    Physical Exam   BP: 127/60  Pulse: 67  Temp: 98  F (36.7  C)  Resp: 17  Weight: 73.5 kg (162 lb)  SpO2: 99 %  Physical Exam  Vitals and nursing note reviewed.   Constitutional:       General: She is in acute distress.      Appearance: Normal appearance. She is well-developed. She is not toxic-appearing.      Comments: Patient valuated here with  appears mildly pale but otherwise pleasant sitting upright vitals reviewed with nursing reported to be stable   HENT:      Head: Normocephalic and atraumatic.      Nose: Nose normal.      Comments: No reports of nosebleeds     Mouth/Throat:      Mouth: Mucous membranes are moist.      Pharynx: Oropharynx is clear.   Eyes:      General: No scleral icterus.     Extraocular Movements: Extraocular movements intact.      Conjunctiva/sclera: Conjunctivae normal.      Pupils: Pupils are equal, round, and reactive to light.   Cardiovascular:      Rate and Rhythm: Normal rate and regular rhythm.   Pulmonary:      Effort: Pulmonary effort is normal. No respiratory distress.      Breath sounds: No stridor.   Abdominal:       General: Abdomen is flat. There is no distension.      Palpations: Abdomen is soft. There is no mass.      Tenderness: There is no abdominal tenderness. There is no guarding or rebound.   Genitourinary:     Comments: Reported melena  Musculoskeletal:         General: No tenderness or signs of injury.      Cervical back: Normal range of motion and neck supple. No rigidity.   Skin:     General: Skin is warm and dry.      Capillary Refill: Capillary refill takes less than 2 seconds.      Coloration: Skin is pale. Skin is not jaundiced.      Findings: No rash.   Neurological:      General: No focal deficit present.      Mental Status: She is alert and oriented to person, place, and time. Mental status is at baseline.   Psychiatric:      Comments: Appropriate here in the ER           ED Course, Procedures, & Data         Records reviewed including laboratory testing previous labs etc.  Patient's hemoglobin 2 days ago was 11.  Patient also interventional cardiology procedure with stenting reviewed for Monday also started on medications as noted Brilinta Eliquis aspirin    IV been established.  We did check some laboratory testing hemoglobin dropped 2 g from 2 days ago.  Now down to approximate 9.  Will continue with some IV fluids at this point a dose of Protonix IV was ordered.  Type and screen ordered also will reassess    Patient here in the ER vitals been stable and IV established white count 8.6 hemoglobin 8.9 which is down compared to previous by couple grams platelets 89  Type and screen done also.  Sodium 142 potassium is 3.9.  BUN is 48 creatinine is 2.21 patient with chronic kidney disease noted.  Glucose 102.  Liver function test within normal limits.  INR is 1.42.    Here in the ER patient received a liter normal saline in the ER Protonix 40 mg IV also.  Discussed with GI also.  They agree at this point patient would need endoscopy I talked to Dr. Weber who done patient's stenting Monday at this point  would recommend cardiology consult certainly holding Eliquis and possibly Brilinta may be continue the aspirin currently at this point.    Discussed with patient and  agree with plan patient otherwise been stable but decompensation after in the ER.  Discussed with medicine agree will be admitted to their service with both cardiology and GI consultation for recent stent placement on dual antiplatelet along with Eliquis.  Patient does have history of some A-fib in the past but states she was recently restarted on the Eliquis.        Procedures                     Results for orders placed or performed during the hospital encounter of 07/19/23   [place] Draw     Status: None    Narrative    The following orders were created for panel order [place] Draw.  Procedure                               Abnormality         Status                     ---------                               -----------         ------                     Extra Blue Top Tube[[id number]]                              Final result               Extra Red Top Tube[[id number]]                               Final result               Extra Green Top (Lithium...[[id number]]                      Final result               Extra Purple Top Tube[[id number]]                            Final result                 Please view results for these tests on the individual orders.   Extra Blue Top Tube     Status: None   Result Value Ref Range    Hold Specimen [facility]    Extra Red Top Tube     Status: None   Result Value Ref Range    Hold Specimen [facility]    Extra Green Top (Lithium Heparin) Tube     Status: None   Result Value Ref Range    Hold Specimen [facility]    Extra Purple Top Tube     Status: None   Result Value Ref Range    Hold Specimen [facility]    Basic metabolic panel     Status: Abnormal   Result Value Ref Range    Sodium 142 136 - 145 mmol/L    Potassium 3.9 3.4 - 5.3 mmol/L    Chloride 107 98 - 107 mmol/L    Carbon Dioxide (CO2) 21 (L) 22 - 29 mmol/L    Anion Gap 14 7  - 15 mmol/L    Urea Nitrogen 48.9 (H) 8.0 - 23.0 mg/dL    Creatinine 2.21 (H) 0.51 - 0.95 mg/dL    Calcium 8.8 8.8 - 10.2 mg/dL    Glucose 102 (H) 70 - 99 mg/dL    GFR Estimate 21 (L) >60 mL/min/1.73m2   CBC with platelets and differential     Status: Abnormal   Result Value Ref Range    WBC Count 8.6 4.0 - 11.0 10e3/uL    RBC Count 2.96 (L) 3.80 - 5.20 10e6/uL    Hemoglobin 8.9 (L) 11.7 - 15.7 g/dL    Hematocrit 29.1 (L) 35.0 - 47.0 %    MCV 98 78 - 100 fL    MCH 30.1 26.5 - 33.0 pg    MCHC 30.6 (L) 31.5 - 36.5 g/dL    RDW 15.1 (H) 10.0 - 15.0 %    Platelet Count 89 (L) 150 - 450 10e3/uL   Hepatic panel     Status: Normal   Result Value Ref Range    Protein Total 6.6 6.4 - 8.3 g/dL    Albumin 4.1 3.5 - 5.2 g/dL    Bilirubin Total 0.5 <=1.2 mg/dL    Alkaline Phosphatase 74 35 - 104 U/L    AST 31 0 - 45 U/L    ALT 11 0 - 50 U/L    Bilirubin Direct 0.21 0.00 - 0.30 mg/dL   INR     Status: Abnormal   Result Value Ref Range    INR 1.42 (H) 0.85 - 1.15   PTT     Status: Abnormal   Result Value Ref Range    aPTT 40 (H) 22 - 38 Seconds   Manual Differential     Status: Abnormal   Result Value Ref Range    % Neutrophils 55 %    % Lymphocytes 27 %    % Monocytes 16 %    % Eosinophils 0 %    % Basophils 0 %    % Metamyelocytes 1 %    % Myelocytes 1 %    NRBCs per 100 WBC 1 (H) <=0 %    Absolute Neutrophils 4.7 1.6 - 8.3 10e3/uL    Absolute Lymphocytes 2.3 0.8 - 5.3 10e3/uL    Absolute Monocytes 1.4 (H) 0.0 - 1.3 10e3/uL    Absolute Eosinophils 0.0 0.0 - 0.7 10e3/uL    Absolute Basophils 0.0 0.0 - 0.2 10e3/uL    Absolute Metamyelocytes 0.1 (H) <=0.0 10e3/uL    Absolute Myelocytes 0.1 (H) <=0.0 10e3/uL    Absolute NRBCs 0.1 (H) <=0.0 10e3/uL    RBC Morphology Confirmed RBC Indices     Platelet Assessment  Automated Count Confirmed. Platelet morphology is normal.     Automated Count Confirmed. Platelet morphology is normal.   Hemoglobin     Status: Abnormal   Result Value Ref Range    Hemoglobin 8.0 (L) 11.7 - 15.7 g/dL   Extra  Tube     Status: None    Narrative    The following orders were created for panel order Extra Tube.  Procedure                               Abnormality         Status                     ---------                               -----------         ------                     Extra Purple Top Tube[[id number]]                            Final result                 Please view results for these tests on the individual orders.   Extra Purple Top Tube     Status: None   Result Value Ref Range    Hold Specimen [facility]    Adult Type and Screen     Status: None   Result Value Ref Range    ABO/RH(D) O NEG     Antibody Screen Negative Negative    SPECIMEN EXPIRATION DATE [id number]    ABO/Rh type and screen *Canceled*     Status: None ()    Narrative    The following orders were created for panel order ABO/Rh type and screen.  Procedure                               Abnormality         Status                     ---------                               -----------         ------                       Please view results for these tests on the individual orders.   CBC with platelets differential     Status: Abnormal    Narrative    The following orders were created for panel order CBC with platelets differential.  Procedure                               Abnormality         Status                     ---------                               -----------         ------                     CBC with platelets and d...[[id number]]  Abnormal            Final result               Manual Differential[[id number]]          Abnormal            Final result                 Please view results for these tests on the individual orders.   ABO/Rh type and screen *Canceled*     Status: None ()    Narrative    The following orders were created for panel order ABO/Rh type and screen.  Procedure                               Abnormality         Status                     ---------                               -----------         ------                      Adult Type and Screen[[id number]]                                                         Please view results for these tests on the individual orders.   ABO/Rh type and screen *Canceled*     Status: None ()    Narrative    The following orders were created for panel order ABO/Rh type and screen.  Procedure                               Abnormality         Status                     ---------                               -----------         ------                       Please view results for these tests on the individual orders.   ABO/Rh type and screen     Status: None    Narrative    The following orders were created for panel order ABO/Rh type and screen.  Procedure                               Abnormality         Status                     ---------                               -----------         ------                     Adult Type and Screen[[id number]]                            Final result                 Please view results for these tests on the individual orders.     Medications   sodium chloride 0.9% infusion (100 mL/hr Intravenous $New Bag 7/19/23 2202)   lidocaine 1 % 0.1-1 mL (has no administration in time range)   lidocaine (LMX4) cream (has no administration in time range)   sodium chloride (PF) 0.9% PF flush 3 mL (3 mLs Intracatheter Not Given 7/19/23 2202)   sodium chloride (PF) 0.9% PF flush 3 mL (has no administration in time range)   melatonin tablet 1 mg (has no administration in time range)   senna-docusate (SENOKOT-S/PERICOLACE) 8.6-50 MG per tablet 1 tablet (has no administration in time range)     Or   senna-docusate (SENOKOT-S/PERICOLACE) 8.6-50 MG per tablet 2 tablet (has no administration in time range)   prochlorperazine (COMPAZINE) injection 5 mg (has no administration in time range)     Or   prochlorperazine (COMPAZINE) tablet 5 mg (has no administration in time range)     Or   prochlorperazine (COMPAZINE) suppository 12.5 mg (has no administration in time range)    allopurinol (ZYLOPRIM) tablet 100 mg (has no administration in time range)   budesonide (PULMICORT) neb solution 0.5 mg (0.5 mg Nebulization $Given 7/19/23 1931)   cyanocobalamin (VITAMIN B-12) tablet 500 mcg (has no administration in time range)   rosuvastatin (CRESTOR) tablet 20 mg (has no administration in time range)   pantoprazole (PROTONIX) IV push injection 40 mg (40 mg Intravenous $Given 7/19/23 2041)   fluticasone-vilanterol (BREO ELLIPTA) 100-25 MCG/ACT inhaler 1 puff (1 puff Inhalation $Given 7/19/23 2030)   pantoprazole (PROTONIX) IV push injection 40 mg (40 mg Intravenous $Given 7/19/23 1148)     Labs Ordered and Resulted from Time of ED Arrival to Time of ED Departure   BASIC METABOLIC PANEL - Abnormal       Result Value    Sodium 142      Potassium 3.9      Chloride 107      Carbon Dioxide (CO2) 21 (*)     Anion Gap 14      Urea Nitrogen 48.9 (*)     Creatinine 2.21 (*)     Calcium 8.8      Glucose 102 (*)     GFR Estimate 21 (*)    CBC WITH PLATELETS AND DIFFERENTIAL - Abnormal    WBC Count 8.6      RBC Count 2.96 (*)     Hemoglobin 8.9 (*)     Hematocrit 29.1 (*)     MCV 98      MCH 30.1      MCHC 30.6 (*)     RDW 15.1 (*)     Platelet Count 89 (*)    INR - Abnormal    INR 1.42 (*)    PARTIAL THROMBOPLASTIN TIME - Abnormal    aPTT 40 (*)    DIFFERENTIAL - Abnormal    % Neutrophils 55      % Lymphocytes 27      % Monocytes 16      % Eosinophils 0      % Basophils 0      % Metamyelocytes 1      % Myelocytes 1      NRBCs per 100 WBC 1 (*)     Absolute Neutrophils 4.7      Absolute Lymphocytes 2.3      Absolute Monocytes 1.4 (*)     Absolute Eosinophils 0.0      Absolute Basophils 0.0      Absolute Metamyelocytes 0.1 (*)     Absolute Myelocytes 0.1 (*)     Absolute NRBCs 0.1 (*)     RBC Morphology Confirmed RBC Indices      Platelet Assessment        Value: Automated Count Confirmed. Platelet morphology is normal.   HEPATIC FUNCTION PANEL - Normal    Protein Total 6.6      Albumin 4.1      Bilirubin  Total 0.5      Alkaline Phosphatase 74      AST 31      ALT 11      Bilirubin Direct 0.21     TYPE AND SCREEN, ADULT    ABO/RH(D) O NEG      Antibody Screen Negative      SPECIMEN EXPIRATION DATE [id number]     ABO/RH TYPE AND SCREEN     No orders to display          Critical care was not performed.     Medical Decision Making  The patient's presentation was of high complexity (an acute health issue posing potential threat to life or bodily function).    The patient's evaluation involved:  review of external note(s) from 3+ sources (see separate area of note for details)  ordering and/or review of 3+ test(s) in this encounter (see separate area of note for details)  review of 3+ test result(s) ordered prior to this encounter (see separate area of note for details)  discussion of management or test interpretation with another health professional (see separate area of note for details)    The patient's management necessitated high risk (a decision regarding hospitalization).      Assessment & Plan   88-year-old female presents the ER with blood in stool yesterday and the melena today.  No history of documented peptic ulcer disease liver disease etc. patient recent stent placement in first diagonal on Monday he is on dual platelet along with Eliquis.  Patient vitally stable hemoglobin dropped 2 g from 2 days ago.  Given Protonix IV.  Otherwise no instability noted discussed with cardiology regarding medications discussed with GI discussed with medicine will be admitted to medicine for cardiology and GI consultation for evaluation of melena on dual antiplatelet therapy along with Eliquis with a recent stent placed.       I have reviewed the nursing notes. I have reviewed the findings, diagnosis, plan and need for follow up with the patient.    Current Discharge Medication List          Final diagnoses:   Melena   S/P coronary artery stent placement   Long term (current) use of anticoagulants   Antiplatelet or  antithrombotic long-term use   CKD (chronic kidney disease) stage 4, GFR 15-29 ml/min (H)   General weakness   Anemia, unspecified type       Dima San  [facility] EMERGENCY DEPARTMENT  7/19/2023    This note was created at least in part by the use of dragon voice dictation system. Inadvertent typographical errors may still exist.  Dima San MD.    Patient evaluated in the emergency department during the COVID-19 pandemic period. Careful attention to patients safety was addressed throughout the evaluation. Evaluation and treatment management was initiated with disposition made efficiently and appropriate as possible to minimize any risk of potential exposure to patient during this evaluation.       Dima San MD  07/19/23 7744

## 2023-07-19 NOTE — H&P
[facility]    History and Physical - Medicine Service, MAROON TEAM 1       Date of Admission:  7/19/2023    Assessment & Plan      Tessa Mansfield is a 88 year old female with a past medical history significant for PMH of CAD (PCI at all 3 vessels at various times from 3959-7401) and recent RCA PCI (07/17), sick sinus syndrome s/p dual-chamber PPM, and newly diagnosed atrial fibrillation, hypertension, chronic kidney disease, hyperlipidemia, gout who presented to the emergency with  generalized weakness, bloody stools/melena, concerning for GI bleed and admitted for acute on chronic anemia and coagulopathy    #Melena 2/2 G.I Bleed  #Acute on Chronic Anemia due to above  #General weakness   #Coagulopathy with previous DAPT and S/P PCI stent placement  The patient's presentation including symptoms of general weakness, melena, and acute on chronic anemia --> (Hemoglobin 8.9, baseline of 10.8 to 12.4), INR of 1.42 and PTT of 40. G.I bleed likely in setting of DAPT (Aspirin, briliinta) and Apixaban for atrial fibrillation. Brilinta added s/p PCI on 07/17. Hemodynamically stable on presentation.  -GI consulted, appreciate recs    >Tentative plan for EGD on 07/20    >IV PPI BID  -Holding antiplatelets and anticoagulant  -Cardiology Consulted, appreciate recs    >Hold all anticoagulation/antiplatelet until GI procedure.     >If bleeding is addressed tomorrow (07/20), can restart Elequis 2.5 mg BID and switch ticagrelor to clopidigrel (first day loading dose of 300 mg then 75 mg daily afterwards).   -Holding PTA Metoprolol, Amlodipine, Furosemide, hydralazine  -PT/OT     #Hypertension  #CKD Stage 4  Creatinine on presentation 2.21; baseline 1.8-2.2.  -CTM renal function  -Holding PTA Amlodipine, Furosemide, Hydralazine, Isosorbide mononitrate in setting of G.I Bleed      #Paroxysmal atrial fibrillation  #SSS s/p dual chamber PPM  [id number] = 5 (HTN, age, CAD, female)  -  "Holding PTA Apixaban and Metoprolol      # Mild mitral insufficiency  # Mild tricuspid insufficiency  Last TTE 7/2023 with EF 55-60%.  -OP Cards f/u 07/31    # Multivessel CAD s/p (PCI at all 3 vessels at various times from 2365-6467, known 80% ISR of LCx)  # RCA PCI (07/17)  -Holding  DAPT: Eliquis + asa   -Continue statin           Diet:   Clear liquid diet   DVT Prophylaxis:,holding at this time due to GI bleed.  Bonner Catheter: Not present  Fluids: Sodium chloride 0.9% infusion at 100ml/hr continuous  Lines: None     Cardiac Monitoring: None  Code Status: Full Code      Clinically Significant Risk Factors Present on Admission               # Drug Induced Coagulation Defect: home medication list includes an anticoagulant medication  # Drug Induced Platelet Defect: home medication list includes an antiplatelet medication   # Hypertension: Noted on problem list      # Overweight: Estimated body mass index is 26.47 kg/m  as calculated from the following:    Height as of 6/28/23: 1.666 m (5' 5.59\").    Weight as of 7/13/23: 73.5 kg (162 lb).            Disposition Plan      Expected Discharge Date: 07/21/2023                The patient's care was discussed with the Attending Physician, Dr. Márqeuz.    Luciano Wakefield  Medical Student  Medicine Service, [facility] TEAM [address] [place] [address]  Securely message with Vocera (more info)  Text page via [facility] Paging/Directory   See signed in provider for up to date coverage information  [phone] pager_________________________________________    Resident/Fellow Attestation   I, Zurdo Dockery MD, was present with the medical/NAPOLEON student who participated in the service and in the documentation of the note.  I have verified the history and personally performed the physical exam and medical decision making.  I agree with the assessment and plan of care as documented in the note.      Changes made in medical student's note    Zurdo Dockery, " MD  PGY3  Date of Service (when I saw the patient): 07/19/23    Chief Complaint   Generalized weakness and melena     History is obtained from the patient.    History of Present Illness   Tessa Mansfield is a 88 year old female with a past medical history significant for CAD with multiple stents placement, hypertension, chronic kidney disease, high cholesterol, gout, sick sinus syndrome, A-fib, with a pacemaker in place who presented to the emergency with a two day history generalized weakness, bloody stools yesterday, and melena today, admitted for concerns of GI bleed, acute on chronic anemia, and coagulopathy evaluation. The patient was previously on Eliquis and Aspirin and was started on Brillinta after her most recent PCI stent placing on 7/17; labs done in the ED revealed hemoglobin dropping from 11 two days ago to 8.9 today. IV fluids and Protonix IV 40mg were started. The patient states at this time she is feeling good. She denies complaints of pain at this time. She states nothing is bothering her at this time.       Past Medical History    Past Medical History:   Diagnosis Date     CAD (coronary artery disease)      CAD s/p PCI+BMS to MRCA 10/2002, PCI to mLCX 2/2003, PCI+DESx2 to pLAD 11/2007, PCI+DESx1 to dRCA 12/2007, PCI+ALVAREZ to RPDA 7/2013; on indefinite DAPT  10/27/2002    10/27/2002: AMI s/p PCI+BMS (3.5x18mm Bx velocity) to mRCA 2/5/2003: Back pain; PCI+stent to mLCX c/b distal embolization/slow flow 4/11/2003: Unstable angina; PCI+stent (Hepacoat velocity) to mLAD 11/27/2007: PCI+DESx2 to pLAD (IVUS w/ calcification of LMCA and ulcerated plaque pLAD, 80% dRCA; also had 80% dLCX, too small to stent) 12/11/2007: Staged PCI. PCI+DESx1 (3.5x13mm Cypher) to dRCA (indefinite DAPT recommended at this time) 6/27/2008: Angina. mLCX stent 70% ISR. LAD and RCA stents patent. Myocardium at risk from LCX felt to be small, medical management preferred to PCI. 11/10/2009: Angina. Findings unchanged from  6/27/2008, FFR LAD 0.90. Medical management recommended. 7/23/2013: Unstable angina; PCI+ALVAREZ to mPDA. Diagnostic findings: LMCA 40% distal. LAD: pLAD stents patent mLAD 30% ISR dLAD 50% diffuse, D2 diffuse disease. LCX 80% mid ISR. RCA diffuse <30% mid, RPLAs diffuse disease, RPDA 100% occlusion.       Cardiac Pacemaker- Medtronic, dual chamber- NOT dependant 11/28/2007     CKD (chronic kidney disease), stage IV (H)      Hyperlipidemia LDL goal <70      Hypertension      Stented coronary artery 10-    RCA     Stented coronary artery 2-5-2003    LCx     Stented coronary artery 4-    LAD     Stented coronary artery 11-    LAD     Stented coronary artery 12-    RCA     Transient complete heart block (H) 11/28/2007       Past Surgical History   Past Surgical History:   Procedure Laterality Date     CHOLECYSTECTOMY       CV CORONARY ANGIOGRAM N/A 6/17/2022    Procedure: Coronary Angiogram;  Surgeon: Constantine Macias MD;  Location:  HEART CARDIAC CATH LAB     CV PCI N/A 6/17/2022    Procedure: Percutaneous Coronary Intervention;  Surgeon: Constantine Macias MD;  Location:  HEART CARDIAC CATH LAB     CV RIGHT HEART CATH MEASUREMENTS RECORDED N/A 6/17/2022    Procedure: Right Heart Catheterization;  Surgeon: Constantine Macias MD;  Location:  HEART CARDIAC CATH LAB     EP PACEMAKER N/A 10/15/2019    Procedure: EP PACEMAKER;  Surgeon: Anthony Zhu MD;  Location:  HEART CARDIAC CATH LAB     HC PPM INSERTION NEW/REPLACEMENT W/ ATRIAL&VENTRICULAR LEAD  11-     HYSTERECTOMY         Prior to Admission Medications   Prior to Admission Medications   Prescriptions Last Dose Informant Patient Reported? Taking?   Multiple Vitamins-Minerals (PRESERVISION AREDS 2+MULTI VIT PO) 7/18/2023 Self Yes Yes   Sig: Take by mouth 2 times daily   VITAMIN D3 25 MCG (1000 UT) tablet 7/18/2023 Self No Yes   Sig: TAKE 1 TABLET BY MOUTH ONCE DAILY **DUE  TO  BE  SEEN  FOR  MORE  REFILLS**    allopurinol (ZYLOPRIM) 100 MG tablet  Self No No   Sig: Take 1 tablet (100 mg) by mouth daily Patient needs to see primary provider and have labs for further refills. Please call for an appointment at [phone].   amLODIPine (NORVASC) 10 MG tablet 7/18/2023 Self No Yes   Sig: Take 1 tablet (10 mg) by mouth daily   apixaban ANTICOAGULANT (ELIQUIS) 2.5 MG tablet 7/18/2023 Self No Yes   Sig: Take 1 tablet (2.5 mg) by mouth 2 times daily   aspirin 81 MG EC tablet 7/18/2023  No Yes   Sig: Take 1 tablet (81 mg) by mouth daily for 360 days Start tomorrow.   aspirin 81 MG tablet 7/18/2023 Self Yes Yes   Sig: Take 1 tablet by mouth daily.   budesonide (PULMICORT) 0.5 MG/2ML neb solution 7/18/2023 Self No Yes   Sig: Take 2 mLs (0.5 mg) by nebulization 2 times daily   cyanocolbalamin (VITAMIN B-12) 1000 MCG tablet 7/18/2023 Self Yes Yes   Sig: Take 500 mcg by mouth daily As directed   fluticasone-salmeterol (ADVAIR) 250-50 MCG/ACT inhaler  Self No No   Sig: INHALE 1 DOSE BY MOUTH TWICE DAILY   furosemide (LASIX) 20 MG tablet 7/18/2023 Self Yes Yes   Sig: Take 2 tablets (40 mg) by mouth 2 times daily   hydrALAZINE (APRESOLINE) 10 MG tablet 7/18/2023 Self Yes Yes   Sig: Take 3 tablets (30 mg) by mouth 3 times daily   isosorbide mononitrate (ISMO/MONOKET) 20 MG tablet 7/18/2023 Self No Yes   Sig: Take 2 tablets (40 mg) by mouth 3 times daily   metoprolol tartrate (LOPRESSOR) 100 MG tablet 7/18/2023 Self No Yes   Sig: Take 1 tablet (100 mg) by mouth 2 times daily Patient needs to see primary provider and have labs for further refills. Please call for an appointment at [phone].   omeprazole (PRILOSEC) 40 MG DR capsule 7/18/2023 Self No Yes   Sig: Take 1 capsule (40 mg) by mouth daily   potassium chloride ER (K-TAB/KLOR-CON) 10 MEQ CR tablet 7/18/2023 Self No Yes   Sig: TAKE 2 TABLETS BY MOUTH IN THE MORNING AND 1 IN THE EVENING   rosuvastatin (CRESTOR) 20 MG tablet 7/18/2023  No Yes   Sig: Take 1 tablet (20 mg) by mouth  daily for 360 days   ticagrelor (BRILINTA) 90 MG tablet 7/18/2023  No Yes   Sig: Take 1 tablet (90 mg) by mouth 2 times daily for 7 days Start this evening.   timolol maleate (TIMOPTIC) 0.5 % ophthalmic solution  Self Yes No   Sig: INSTILL 1 DROP INTO EACH EYE ONCE DAILY IN THE MORNING      Facility-Administered Medications: None        Review of Systems    The 10 point Review of Systems is negative other than noted in the HPI or here.      Physical Exam   Vital Signs: Temp: 98  F (36.7  C)   BP: 127/60 Pulse: 67   Resp: 17 SpO2: 99 % O2 Device: None (Room air)    Weight: 0 lbs 0 oz    General Appearance: NAD, well-appearing, alert, interactive, laying in bed.   HEENT: Extraocular movements intact. Good conjugate gaze, nares patent.  Neck is supple with normal ROM.  Respiratory: Normal respiratory effort, no wheezing, rhonchi, or rales.   Cardiovascular: Regular rate and rhythm. Normal S1/S2. No murmurs, gallops, or rubs. 2+ dp equally bilaterally.  GI: Soft, non-distended, non-tender, normal bowel sounds, no masses.   Genitourinary: Not examined.   Skin: Intact, no rashes or lesions. Patient has a wound from following on the right lower shin, covered with bandages.   Musculoskeletal: Normal tone and ROM. Slightly edematous lower extremities bilaterally.  Neurologic: Alert and oriented x 3. Cranial Nerves II to XII grossly intact.   Psychiatric: Pleasant and appropriate.     Medical Decision Making             Data     I have personally reviewed the following data over the past 24 hrs:    8.6  \   8.9 (L)   / 89 (L)     142 107 48.9 (H) /  102 (H)   3.9 21 (L) 2.21 (H) \       ALT: 11 AST: 31 AP: 74 TBILI: 0.5   ALB: 4.1 TOT PROTEIN: 6.6 LIPASE: N/A       INR:  1.42 (H) PTT:  40 (H)   D-dimer:  N/A Fibrinogen:  N/A

## 2023-07-19 NOTE — SUMMARY OF CARE
Pt's belongings on admission:Cell phone without , glasses, slippers, pants, sweatshirt,T-shirt,socks, no watch, no jewelry,no purse

## 2023-07-19 NOTE — CONSULTS
GASTROENTEROLOGY CONSULTATION      Date of Admission:  7/19/2023  Requesting physician: Zurdo Dockery MD           Reason for Consultation:   patient with melena s/p PCI 2 days ago. On triple therapy (Asa, Brilinta, Eliquis). Appreciate g.i intervention           ASSESSMENT AND RECOMMENDATIONS:   Assessment:  Tessa Mansfield is a 88 year old female with a history of CAD (PCI at all 3 vessels at various times from 2002) and most recent PCI with stent to distal RCA for dyspnea on exertion on 7/17/2023 on triple therapy, sick sinus syndrome s/p dual-chamber PPM, and newly diagnosed atrial fibrillation who presented to the ED red blood in the stool and the melena and found to have acute on chronic anemia.     # Melena  # Hematochezia   # Acute on Chronic anemia  Concern for GI bleeding.  Possibly upper GI with melena such as peptic ulcers.  However small bowel bleeding as well as proximal colon bleeding are possible.  Does have a history of GI bleeding and found to have gastric ulcer in 2006.  Plan for EGD tomorrow with MAC given recent cardiac stent.  If EGD is negative, depending on her clinical course, may consider colonoscopy +/-capsule endoscopy possibly outpatient.     Recommendations  - Clear liquid now and NPO at midnight  - EGD with MAC tomorrow  - Continue PPI IV BID  - Defer antiplatelet therapy and Eliquis management to cardiology but will prefer to hold/reduce antiplatelet/anticoagulation given active GI bleeding if safe from cardiac perspective       Thank you for involving us in this patient's care. Please do not hesitate to contact the GI service with any questions or concerns.     Pt care plan discussed with Dr. Dash, GI staff physician.      Ronda Medel MD PhD  GI Fellow   [phone]   -------------------------------------------------------------------------------------------------------------------           History of Present Illness:   Tessa Mansfield is a 88 year old female with a  history of CAD (PCI at all 3 vessels at various times from 2002) and most recent PCI with stent to distal RCA for dyspnea on exertion on 7/17/2023 on triple therapy, sick sinus syndrome s/p dual-chamber PPM, and newly diagnosed atrial fibrillation who presented to the ED red blood in the stool and the melena and found to have acute on chronic anemia.     She underwent cardiac catheterization and stent to distal RCA on 7/17/2023 due to exertional dyspnea.  She was discharged on baby aspirin, Brilinta and Eliquis (Eliquis was for her newly diagnosed A-fib).  She noticed red blood in her stool yesterday followed by melena yesterday and today.  Otherwise she denies any abdominal pain or other GI symptoms. Denies any NSAID use.             Past Medical History:   Reviewed and edited as appropriate  Past Medical History:   Diagnosis Date     CAD (coronary artery disease)      CAD s/p PCI+BMS to MRCA 10/2002, PCI to mLCX 2/2003, PCI+DESx2 to pLAD 11/2007, PCI+DESx1 to dRCA 12/2007, PCI+ALVAREZ to RPDA 7/2013; on indefinite DAPT  10/27/2002    10/27/2002: AMI s/p PCI+BMS (3.5x18mm Bx velocity) to mRCA 2/5/2003: Back pain; PCI+stent to mLCX c/b distal embolization/slow flow 4/11/2003: Unstable angina; PCI+stent (Hepacoat velocity) to mLAD 11/27/2007: PCI+DESx2 to pLAD (IVUS w/ calcification of LMCA and ulcerated plaque pLAD, 80% dRCA; also had 80% dLCX, too small to stent) 12/11/2007: Staged PCI. PCI+DESx1 (3.5x13mm Cypher) to dRCA (indefinite DAPT recommended at this time) 6/27/2008: Angina. mLCX stent 70% ISR. LAD and RCA stents patent. Myocardium at risk from LCX felt to be small, medical management preferred to PCI. 11/10/2009: Angina. Findings unchanged from 6/27/2008, FFR LAD 0.90. Medical management recommended. 7/23/2013: Unstable angina; PCI+ALVAREZ to mPDA. Diagnostic findings: LMCA 40% distal. LAD: pLAD stents patent mLAD 30% ISR dLAD 50% diffuse, D2 diffuse disease. LCX 80% mid ISR. RCA diffuse <30% mid, RPLAs diffuse  disease, RPDA 100% occlusion.       Cardiac Pacemaker- Medtronic, dual chamber- NOT dependant 11/28/2007     CKD (chronic kidney disease), stage IV (H)      Hyperlipidemia LDL goal <70      Hypertension      Stented coronary artery 10-    RCA     Stented coronary artery 2-5-2003    LCx     Stented coronary artery 4-    LAD     Stented coronary artery 11-    LAD     Stented coronary artery 12-    RCA     Transient complete heart block (H) 11/28/2007            Past Surgical History:   Reviewed and edited as appropriate   Past Surgical History:   Procedure Laterality Date     CHOLECYSTECTOMY       CV CORONARY ANGIOGRAM N/A 6/17/2022    Procedure: Coronary Angiogram;  Surgeon: Constantine Macias MD;  Location:  HEART CARDIAC CATH LAB     CV PCI N/A 6/17/2022    Procedure: Percutaneous Coronary Intervention;  Surgeon: Constantine Macias MD;  Location:  HEART CARDIAC CATH LAB     CV RIGHT HEART CATH MEASUREMENTS RECORDED N/A 6/17/2022    Procedure: Right Heart Catheterization;  Surgeon: Constantine Macias MD;  Location:  HEART CARDIAC CATH LAB     EP PACEMAKER N/A 10/15/2019    Procedure: EP PACEMAKER;  Surgeon: Anthony Zhu MD;  [facility]: [facility]     HC PPM INSERTION NEW/REPLACEMENT W/ ATRIAL&VENTRICULAR LEAD  11-     HYSTERECTOMY              Social History:   Reviewed and edited as appropriate  Social History     Socioeconomic History     Marital status:      Spouse name: Not on file     Number of children: 4     Years of education: Not on file     Highest education level: Not on file   Occupational History     Employer: [employer]   Tobacco Use     Smoking status: Former     Packs/day: 0.30     Years: 15.00     Pack years: 4.50     Types: Cigarettes     Smokeless tobacco: Never     Tobacco comments:     Quit 22+ years ago   Substance and Sexual Activity     Alcohol use: Not on file     Drug use: No     Sexual activity:  "Not Currently     Partners: Male     Birth control/protection: Post-menopausal   Other Topics Concern      Service Not Asked     Blood Transfusions Yes     Caffeine Concern Not Asked     Occupational Exposure Not Asked     Hobby Hazards Not Asked     Sleep Concern Not Asked     Stress Concern Not Asked     Weight Concern Not Asked     Special Diet Not Asked     Back Care Not Asked     Exercise No     Bike Helmet Not Asked     Seat Belt Not Asked     Self-Exams Not Asked     Parent/sibling w/ CABG, MI or angioplasty before 65F 55M? Not Asked   Social History Narrative     Not on file     Social Determinants of Health     Financial Resource Strain: Not on file   Food Insecurity: Not on file   Transportation Needs: Not on file   Physical Activity: Not on file   Stress: Not on file   Social Connections: Not on file   Intimate Partner Violence: Not on file   Housing Stability: Not on file            Family History:   Reviewed and edited as appropriate  Family History   Problem Relation Age of Onset     Cancer Sister 82        bladder cancer            Allergies:   Reviewed and edited as appropriate     Allergies   Allergen Reactions     Bactrim [Sulfamethoxazole W/Trimethoprim] Other (See Comments)     Patient unable to recall     Biaxin [Clarithromycin]      Chlorthalidone Nausea and Vomiting     Clonidine      Codeine Sulfate Itching     Darvon [Propoxyphene Hcl]      Dilaudid [Hydromorphone] Visual Disturbance     Hallucinations       Gabapentin Other (See Comments) and Fatigue     \"felt drunk\"     Indomethacin      Levaquin [Levofloxacin Hemihydrate]      Morphine Sulfate      Percocet [Oxycodone-Acetaminophen] Other (See Comments)     hallucinations     Pregabalin Fatigue     Pregabalin Itching     Other reaction(s): Fatigue     Shrimp Swelling     Spironolactone Other (See Comments)     Dehydrated       Terazosin      Ciprofloxacin Rash     Simvastatin Palpitations     Muscle weakness, leg cramping          "     Medications:   (Not in a hospital admission)            Review of Systems:     A complete 10 point review of systems was performed and is negative except as noted in the HPI           Physical Exam:   /60   Pulse 67   Temp 98  F (36.7  C)   Resp 17   SpO2 99%   Wt:   Wt Readings from Last 2 Encounters:   07/13/23 73.5 kg (162 lb)   06/28/23 75.1 kg (165 lb 8 oz)      Constitutional: No acute distress, resting comfortably in bed  Eyes: Sclera anicteric  Ears/nose/mouth/throat: Moist mucus membranes, hearing intact  Neck: supple  CV: No edema  Respiratory: Breathing comfortably on room air  Abd: Soft, nontender, nondistended, bowel sounds present  Skin: warm, perfused, no jaundice  Neuro: AAO x 3  Psych: Normal affect  MSK: No gross deformities         Data:   Labs and imaging below were independently reviewed and interpreted    BMP  Recent Labs   Lab 07/19/23  0952 07/17/23  0952    143   POTASSIUM 3.9 4.1   CHLORIDE 107 107   ALEXANDRO 8.8 9.2   CO2 21* 22   BUN 48.9* 47.1*   CR 2.21* 2.23*   * 97     CBC  Recent Labs   Lab 07/19/23  0952 07/17/23  0952   WBC 8.6 6.7   RBC 2.96* 3.61*   HGB 8.9* 11.0*   HCT 29.1* 36.3   MCV 98 101*   MCH 30.1 30.5   MCHC 30.6* 30.3*   RDW 15.1* 15.0   PLT 89* 99*     INR  Recent Labs   Lab 07/19/23  0952 07/17/23  0952   INR 1.42* 1.11     LFTs  Recent Labs   Lab 07/19/23  0952   ALKPHOS 74   AST 31   ALT 11   BILITOTAL 0.5   PROTTOTAL 6.6   ALBUMIN 4.1      PANCNo lab results found in last 7 days.    Imaging: None    Endoscopy: Reviewed EGD and colonoscopy 2006

## 2023-07-19 NOTE — TELEPHONE ENCOUNTER
"Received a call from the call center to discuss blood in stool.  Spoke to Tessa, who says she had a cardiac stent placed on Monday.  She says yesterday she noticed some red blood in her stool, and then it went away.  Today her stool has a mixture of red, brown, and black, and is loose.  She is currently prescribed aspirin and brilinta.  Advised it is recommended to go to the ED for an evaluation.  She verbalized agreement and understanding and plans to go to [facility] ED.  Will send a message to [facility] for an update.  1. APPEARANCE of BLOOD: \"What color is it?\" \"Is it passed separately, on the surface of the stool, or mixed in with the stool?\"   2. AMOUNT: \"How much blood was passed?\"   3. FREQUENCY: \"How many times has blood been passed with the stools?\" since yesterday  4. ONSET: \"When was the blood first seen in the stools?\" (Days or weeks) yesterday  5. DIARRHEA: \"Is there also some diarrhea?\" If Yes, ask: \"How many diarrhea stools in the past 24 hours?\" Loose stool  6. CONSTIPATION: \"Do you have constipation?\" If Yes, ask: \"How bad is it?\"  7. RECURRENT SYMPTOMS: \"Have you had blood in your stools before?\" If Yes, ask: \"When was the last time?\" and \"What happened that time?\"   8. BLOOD THINNERS: \"Do you take any blood thinners?\" (e.g., Coumadin/warfarin, Pradaxa/dabigatran, aspirin) aspirin, brilinta  9. OTHER SYMPTOMS: \"Do you have any other symptoms?\" (e.g., abdomen pain, vomiting, dizziness, fever)  10. PREGNANCY: \"Is there any chance you are pregnant?\" \"When was your last menstrual period?\"    Reason for Disposition    MODERATE rectal bleeding (small blood clots, passing blood without stool, or toilet water turns red) more than once a day    Bloody, black, or tarry bowel movements  (Exception: Chronic-unchanged black-grey bowel movements and is taking iron pills or Pepto-Bismol.)    Protocols used: RECTAL BLEEDING-A-OH      "

## 2023-07-19 NOTE — TELEPHONE ENCOUNTER
Caller reporting the following red-flag symptom(s): Blood in stool    Per the system red-flag symptom policy, patient was instructed to:  speak with a Registered Nurse    Action:  Patient warm transferred to a Registered Nurse

## 2023-07-19 NOTE — CARE PLAN
Transfer Type: [facility]  Transfer Triage Note    Originating unit:[facility] ED    Final intended location for transfer: [facility] surg or [facility]     What services or anticipated services require transfer to the [facility]? (please refer to triage job guide or discuss with PP RN if [facility] capabilities would be appropriate) endoscopy, GI services    Tele required:  No    Time of admission request: 1300    Anticipated to be boarding in ED for >4 hours? Yes    Was Hospitalist Team notified to complete H&P, admission orders, and assume care (sign into chart, collaborate with ED nurse, etc)? Yes    Patient added to Interhospital transfer list?  No    Brief case description: 87 yo F with CAD s/p PCI with recent PCI of diagonal branch and blood in her stool. She is currently on DAPT after recent stent placement and is also on eliquis. GI and Cardiology discussed with in the ER and plan  For admission for potential endoscopy for GI bleed as well as evaluation of anticoagulation.     Ty Vallejo MD

## 2023-07-19 NOTE — CONSULTS
Cardiology Consultation  Tessa Mansfield [id number] 88 year old 1935  7/19/2023        Date of Admission: 7/19/2023  Requesting physician: Mario Alberto Phoenix MD       Reason for Consultation:   Anticoagulation, DAPT management in the setting of melena and recent stent placement    History is obtained from the patient         Assessment and Plan:   Tessa Mansfield is a 88 year old female with PMHx of NSTEMI, CAD s/p many stents including to LAD, left circumflex, and RCA on DAPT, HFpEF (EF 55-60%), sick sinus syndrome s/p dual chamber pacemaker, atrial fibrillation AC with elequis, HTN who was admitted on 7/19 due to blood in her stools.    1. Melena + Hematochezia   2. Acute on chronic anemia   3. CAD s/p stenting to LAD, left circumflex, RCA (most recent placed on 7/17/23) on DAPT  4. Hx of STEMI  5. Sick sinus syndrome s/p dual chamber pacemaker  6.  Paroxysmal atrial fibrillation on AC, [id number]= 6, HAS-BLED= 3 (high risk)   7. HTN  8. HFpEF (55-60%)  Presented with blood in stool, hemoglobin drop from 11 to 8.9. Blood pressure stable at 127/60. Per chart review patient has history of episodes of epistaxis, hemoptysis, GI bleeding with gastric ulcers, and easy bleeding in the past when on plavix + aspirin or on warfarin.  Just had new stent to RCA placed on 7/17/23 as elective procedure due to angina. Has had multiple other stents placed in the past. History of Atrial fibrillation and NSTEMI as well. Currently on apixaban 2.5 mg BID, ticagrelor 90 mg BID, aspirin 81 mg. [id number] of 6 (age, HF, female, HTN, prior MI). History of HFpEF most recent echo with EF of 55-60% with no major valvular abnormalities, PA systolic pressure of 32 mmHg. Pacemaker interrogation from 5/23/23 copied below.  Home cardiac medications: amlodipine 10 mg, aspirin 81 mg, elequis 2.5 mg BID, lasix 20 mg BID, hydralazine 100 mg BID, Imdur 20 mg, metoprolol tartrate 100 mg BID, Crestor 20 mg, ticagrelor 20 mg  Plan to hold  AC/AP until GI procedure/bleeding addressed. Then switch ticagrelor to clopidogrel (less potent with lower bleeding risk). Hold aspirin. Ok to interrupt for brief time especially being recent stent is in a less critical position in the distal RCA and it being an elective procedure for angina as opposed to an NSTEMI.        Cardiac Cath- 7/17/23     Prox LAD to Mid LAD lesion is 40% stenosed.    Ost LAD to Prox LAD lesion is 50% stenosed.    Prox Cx to Mid Cx lesion is 55% stenosed.    1st Diag lesion is 85% stenosed.    2nd Diag lesion is 70% stenosed.    RPDA-2 lesion is 50% stenosed.    RPDA-1 lesion is 65% stenosed.    Mid LAD lesion is 70% stenosed.  Mid RCA to Dist RCA lesion is 80% stenosed.         Recommendations:   - Hold all anticoagulation/antiplatelet until GI procedure.   - If bleeding is addressed tomorrow can restart Elequis 2.5 mg BID and switch ticagrelor to clopidigrel (first day loading dose of 300 mg then 75 mg daily afterwards).     Patient discussed with attending Dr. Howard.  Please do not hesitate to contact our service with any questions or concerns.     José Luis Dang MS4  [facility]    Resident/Fellow Attestation   I, Jalen Barnes MD, was present with the medical/NAPOLEON student who participated in the service and in the documentation of the note.  I have verified the history and personally performed the physical exam and medical decision making.  I agree with the assessment and plan of care as documented in the note.      Jalen Barnes MD  PGY4  Date of Service (when I saw the patient): 07/19/23         HPI:   Presented to the ED on 7/19 with both red blood and black tarry stools. Recently had RCA stent placed on 7/17 was on triple therapy. The next stay she had a few streaks of blood when wiping. Today she had overt blood in her stool along with black tarry stools. Denies light headedness or chest pains.         Past Medical History:     Past Medical History:   Diagnosis Date     CAD (coronary artery disease)     CAD s/p PCI+BMS to MRCA 10/2002, PCI to mLCX 2/2003, PCI+DESx2 to pLAD 11/2007, PCI+DESx1 to dRCA 12/2007, PCI+ALVAREZ to RPDA 7/2013; on indefinite DAPT  10/27/2002    10/27/2002: AMI s/p PCI+BMS (3.5x18mm Bx velocity) to mRCA 2/5/2003: Back pain; PCI+stent to mLCX c/b distal embolization/slow flow 4/11/2003: Unstable angina; PCI+stent (Hepacoat velocity) to mLAD 11/27/2007: PCI+DESx2 to pLAD (IVUS w/ calcification of LMCA and ulcerated plaque pLAD, 80% dRCA; also had 80% dLCX, too small to stent) 12/11/2007: Staged PCI. PCI+DESx1 (3.5x13mm Cypher) to dRCA (indefinite DAPT recommended at this time) 6/27/2008: Angina. mLCX stent 70% ISR. LAD and RCA stents patent. Myocardium at risk from LCX felt to be small, medical management preferred to PCI. 11/10/2009: Angina. Findings unchanged from 6/27/2008, FFR LAD 0.90. Medical management recommended. 7/23/2013: Unstable angina; PCI+ALVAREZ to mPDA. Diagnostic findings: LMCA 40% distal. LAD: pLAD stents patent mLAD 30% ISR dLAD 50% diffuse, D2 diffuse disease. LCX 80% mid ISR. RCA diffuse <30% mid, RPLAs diffuse disease, RPDA 100% occlusion.      Cardiac Pacemaker- Medtronic, dual chamber- NOT dependant 11/28/2007    CKD (chronic kidney disease), stage IV (H)     Hyperlipidemia LDL goal <70     Hypertension     Stented coronary artery 10-    RCA    Stented coronary artery 2-5-2003    LCx    Stented coronary artery 4-    LAD    Stented coronary artery 11-    LAD    Stented coronary artery 12-    RCA    Transient complete heart block (H) 11/28/2007            Past Surgical History:     Past Surgical History:   Procedure Laterality Date    CHOLECYSTECTOMY      CV CORONARY ANGIOGRAM N/A 6/17/2022    Procedure: Coronary Angiogram;  Surgeon: Constantine Macias MD;  Location: [facility]    CV PCI N/A 6/17/2022    Procedure: Percutaneous Coronary Intervention;  Surgeon: Constantine Macias MD;   Location:  HEART CARDIAC CATH LAB    CV RIGHT HEART CATH MEASUREMENTS RECORDED N/A 6/17/2022    Procedure: Right Heart Catheterization;  Surgeon: Constantine Macias MD;  Location:  HEART CARDIAC CATH LAB    EP PACEMAKER N/A 10/15/2019    Procedure: EP PACEMAKER;  Surgeon: Anthony Zhu MD;  Location:  HEART CARDIAC CATH LAB    HC PPM INSERTION NEW/REPLACEMENT W/ ATRIAL&VENTRICULAR LEAD  11-    HYSTERECTOMY              Medications:     Current Facility-Administered Medications   Medication    [START ON 7/20/2023] allopurinol (ZYLOPRIM) tablet 100 mg    budesonide (PULMICORT) neb solution 0.5 mg    [START ON 7/20/2023] cyanocobalamin (VITAMIN B-12) tablet 500 mcg    fluticasone-salmeterol (ADVAIR) 250-50 MCG/ACT diskus inhaler 1 puff    lidocaine (LMX4) cream    lidocaine 1 % 0.1-1 mL    melatonin tablet 1 mg    prochlorperazine (COMPAZINE) injection 5 mg    Or    prochlorperazine (COMPAZINE) tablet 5 mg    Or    prochlorperazine (COMPAZINE) suppository 12.5 mg    [START ON 7/20/2023] rosuvastatin (CRESTOR) tablet 20 mg    senna-docusate (SENOKOT-S/PERICOLACE) 8.6-50 MG per tablet 1 tablet    Or    senna-docusate (SENOKOT-S/PERICOLACE) 8.6-50 MG per tablet 2 tablet    sodium chloride (PF) 0.9% PF flush 3 mL    sodium chloride (PF) 0.9% PF flush 3 mL    sodium chloride 0.9% infusion     Current Outpatient Medications   Medication Sig    amLODIPine (NORVASC) 10 MG tablet Take 1 tablet (10 mg) by mouth daily    apixaban ANTICOAGULANT (ELIQUIS) 2.5 MG tablet Take 1 tablet (2.5 mg) by mouth 2 times daily    aspirin 81 MG EC tablet Take 1 tablet (81 mg) by mouth daily for 360 days Start tomorrow.    aspirin 81 MG tablet Take 1 tablet by mouth daily.    budesonide (PULMICORT) 0.5 MG/2ML neb solution Take 2 mLs (0.5 mg) by nebulization 2 times daily    cyanocolbalamin (VITAMIN B-12) 1000 MCG tablet Take 500 mcg by mouth daily As directed    furosemide (LASIX) 20 MG tablet Take 2 tablets (40 mg) by  "mouth 2 times daily    hydrALAZINE (APRESOLINE) 10 MG tablet Take 3 tablets (30 mg) by mouth 3 times daily    isosorbide mononitrate (ISMO/MONOKET) 20 MG tablet Take 2 tablets (40 mg) by mouth 3 times daily    metoprolol tartrate (LOPRESSOR) 100 MG tablet Take 1 tablet (100 mg) by mouth 2 times daily Patient needs to see primary provider and have labs for further refills. Please call for an appointment at [phone].    Multiple Vitamins-Minerals (PRESERVISION AREDS 2+MULTI VIT PO) Take by mouth 2 times daily    omeprazole (PRILOSEC) 40 MG DR capsule Take 1 capsule (40 mg) by mouth daily    potassium chloride ER (K-TAB/KLOR-CON) 10 MEQ CR tablet TAKE 2 TABLETS BY MOUTH IN THE MORNING AND 1 IN THE EVENING    rosuvastatin (CRESTOR) 20 MG tablet Take 1 tablet (20 mg) by mouth daily for 360 days    ticagrelor (BRILINTA) 90 MG tablet Take 1 tablet (90 mg) by mouth 2 times daily for 7 days Start this evening.    VITAMIN D3 25 MCG (1000 UT) tablet TAKE 1 TABLET BY MOUTH ONCE DAILY **DUE  TO  BE  SEEN  FOR  MORE  REFILLS**    allopurinol (ZYLOPRIM) 100 MG tablet Take 1 tablet (100 mg) by mouth daily Patient needs to see primary provider and have labs for further refills. Please call for an appointment at [phone].    fluticasone-salmeterol (ADVAIR) 250-50 MCG/ACT inhaler INHALE 1 DOSE BY MOUTH TWICE DAILY    timolol maleate (TIMOPTIC) 0.5 % ophthalmic solution INSTILL 1 DROP INTO EACH EYE ONCE DAILY IN THE MORNING            Allergies      Allergies   Allergen Reactions    Bactrim [Sulfamethoxazole W/Trimethoprim] Other (See Comments)     Patient unable to recall    Biaxin [Clarithromycin]     Chlorthalidone Nausea and Vomiting    Clonidine     Codeine Sulfate Itching    Darvon [Propoxyphene Hcl]     Dilaudid [Hydromorphone] Visual Disturbance     Hallucinations      Gabapentin Other (See Comments) and Fatigue     \"felt drunk\"    Indomethacin     Levaquin [Levofloxacin Hemihydrate]     Morphine Sulfate     Percocet " [Oxycodone-Acetaminophen] Other (See Comments)     hallucinations    Pregabalin Fatigue    Pregabalin Itching     Other reaction(s): Fatigue    Shrimp Swelling    Spironolactone Other (See Comments)     Dehydrated      Terazosin     Ciprofloxacin Rash    Simvastatin Palpitations     Muscle weakness, leg cramping              Social History:   Reviewed and edited as appropriate  Social History     Socioeconomic History    Marital status:      Spouse name: Not on file    Number of children: 4    Years of education: Not on file    Highest education level: Not on file   Occupational History     Employer: [employer]   Tobacco Use    Smoking status: Former     Packs/day: 0.30     Years: 15.00     Pack years: 4.50     Types: Cigarettes    Smokeless tobacco: Never    Tobacco comments:     Quit 22+ years ago   Substance and Sexual Activity    Alcohol use: Not on file    Drug use: No    Sexual activity: Not Currently     Partners: Male     Birth control/protection: Post-menopausal   Other Topics Concern     Service Not Asked    Blood Transfusions Yes    Caffeine Concern Not Asked    Occupational Exposure Not Asked    Hobby Hazards Not Asked    Sleep Concern Not Asked    Stress Concern Not Asked    Weight Concern Not Asked    Special Diet Not Asked    Back Care Not Asked    Exercise No    Bike Helmet Not Asked    Seat Belt Not Asked    Self-Exams Not Asked    Parent/sibling w/ CABG, MI or angioplasty before 65F 55M? Not Asked   Social History Narrative    Not on file     Social Determinants of Health     Financial Resource Strain: Not on file   Food Insecurity: Not on file   Transportation Needs: Not on file   Physical Activity: Not on file   Stress: Not on file   Social Connections: Not on file   Intimate Partner Violence: Not on file   Housing Stability: Not on file           Family History:     Family History   Problem Relation Age of Onset    Cancer Sister 82        bladder cancer             Review of Systems:   A complete 10 point review of systems was obtained.  Please see the HPI for pertinent positives and negatives.  All other systems were reviewed and were found to be negative.          Physical Exam:   VS:  /60   Pulse 67   Temp 98  F (36.7  C)   Resp 17   SpO2 99%     Wt:   Wt Readings from Last 2 Encounters:   07/13/23 73.5 kg (162 lb)   06/28/23 75.1 kg (165 lb 8 oz)        Physical Exam  Gen: NAD  CV: RRR, Ns1,S2. No JVD   Pulm: Clear B/l. No wheezing   Abd: Soft. Non-distended. Non-tender to palpation   Ext:  No Periferal edema   Neuro: Non-focal.          Laboratory:   BMP  Recent Labs   Lab 07/19/23  0952 07/17/23  0952    143   POTASSIUM 3.9 4.1   CHLORIDE 107 107   ALEXANDRO 8.8 9.2   CO2 21* 22   BUN 48.9* 47.1*   CR 2.21* 2.23*   * 97     CBC  Recent Labs   Lab 07/19/23  0952 07/17/23  0952   WBC 8.6 6.7   RBC 2.96* 3.61*   HGB 8.9* 11.0*   HCT 29.1* 36.3   MCV 98 101*   MCH 30.1 30.5   MCHC 30.6* 30.3*   RDW 15.1* 15.0   PLT 89* 99*     INR  Recent Labs   Lab 07/19/23  0952 07/17/23  0952   INR 1.42* 1.11     Liver Enzymes  Recent Labs   Lab 07/19/23  0952   ALKPHOS 74   AST 31   ALT 11   BILITOTAL 0.5   PROTTOTAL 6.6   ALBUMIN 4.1      PANCNo lab results found in last 7 days.    Imaging/Procedures/Studies:          important  This result has not been signed. Information might be incomplete.       Tessa Mansfield  Cardiac Catheterization  Order# [id number]  Reading physician: Preet Fuller MD Ordering physician: Kristi Oates APRN CNP Study date: 7/17/23     Patient Information    Name MRN Description   Tessa Mansfield [id number] 88 year old female     Physicians    Panel Physicians Referring Physician Case Authorizing Physician   Constantine Macias MD (Primary) Kristi Oates APRN CNP Иван, Kristi D, APRN CNP     Procedures    Panel 1    Primary Surgeon:  Constantine Macias MD   Procedure:  Coronary Angiogram    Percutaneous Coronary  Intervention        Indications    Renal hypertension [I12.9 (ICD-10-CM)]   Coronary artery disease of native artery of native heart with stable angina pectoris (H) [I25.118 (ICD-10-CM)]   NSTEMI (non-ST elevated myocardial infarction) (H) [I21.4 (ICD-10-CM)]   Acute on chronic diastolic (congestive) heart failure (H) [I50.33 (ICD-10-CM)]   Chest pain, unspecified type [R07.9 (ICD-10-CM)]     Comments/Patient Narrative    Ms. Tessa Mansfield is a pleasant 88 year old female with medical history pertinent for CAD (PCI at all 3 vessels at various times from 7640-5083, known 80% ISR of LCx), sick sinus syndrome s/p dual-chamber PPM, AF here for coronary angiography due to persistent HIGGINS.     Pre Procedure Diagnosis    worsening angina       Conclusion         Prox LAD to Mid LAD lesion is 40% stenosed.    Ost LAD to Prox LAD lesion is 50% stenosed.    Prox Cx to Mid Cx lesion is 55% stenosed.    1st Diag lesion is 85% stenosed.    2nd Diag lesion is 70% stenosed.    RPDA-2 lesion is 50% stenosed.    RPDA-1 lesion is 65% stenosed.    Mid LAD lesion is 70% stenosed.    Mid RCA to Dist RCA lesion is 80% stenosed.     Right dominance   3V diease   Distal RCA s/p prior stents with 80% ISR  RPDA with  to 65% stenosis   Prox LAD s/p prior stents x2 with 40 and 50% ISR and diffuse disease in mid to distal LAD segment   First diagonal small caliber with diffuse disease and 85% stenosis in mid segment   Mid Cx with 55% stenosis   ALVAREZ SYNERGY XD MR US 2.02B81AY [id number] in distal RCA with BREANNA 3 pre and post stenting          Plan     Follow bedrest per protocol   Continued medical management and lifestyle modifications for cardiovascular risk factor optimizations.     Recommendations    General Recommendations:  - Patient given specific instructions regarding care of arteriotomy site, activity restrictions, signs and symptoms of cardiac or vascular complications and to seek immediate medical evaluation should they occur.      Coronary Findings    Diagnostic  Dominance: Right  Left Anterior Descending   The vessel is small.   Ost LAD to Prox LAD lesion is 50% stenosed. The lesion was previously treated using a stent of unknown type.   Prox LAD to Mid LAD lesion is 40% stenosed. The lesion was previously treated using a stent of unknown type.   Mid LAD lesion is 70% stenosed.      First Diagonal Branch   The vessel is small. There is moderate diffuse disease throughout the vessel.   1st Diag lesion is 85% stenosed.      Second Diagonal Branch   2nd Diag lesion is 70% stenosed.      Left Circumflex   The vessel is small.   Prox Cx to Mid Cx lesion is 55% stenosed. The lesion was previously treated using a stent of unknown type.      First Obtuse Marginal Branch   The vessel is large.      Second Obtuse Marginal Branch   The vessel is small.      Right Coronary Artery   The vessel is large.   Mid RCA to Dist RCA lesion is 80% stenosed. The lesion was previously treated using a drug-eluting stent, a stent of unknown type and angioplasty.   Previously placed Dist RCA drug-eluting and unknown type of stents are widely patent. The lesion was previously treated using angioplasty.      Right Posterior Descending Artery   There is mild diffuse disease throughout the vessel.   RPDA-1 lesion is 65% stenosed.   RPDA-2 lesion is 50% stenosed. The lesion was previously treated using a stent of unknown type.      Right Posterior Atrioventricular Artery   There is mild diffuse disease throughout the vessel.         Intervention     Mid RCA to Dist RCA lesion   Stent (Also treats lesions: Dist RCA)   A guide catheter was successfully placed. The crossed the lesion. The pre-interventional distal flow is normal (BREANNA 3). A STENT CORONARY ALVAREZ SYNERGY XD RUST 2.75G77FK [id number] drug eluting stent was successfully placed. Pre-stent angioplastywasperformed using a CATH BALLOON [facility] 2.74G06YN [id number] supply. . Post-stent angioplasty was  performed using a CATH BALLOON [facility] 2.67M42XT [id number] supply. . The post-interventional distal flow is normal (BREANNA 3). Theinterventionwas successful. No complications occurred at this lesion.   There is a 0% residual stenosis post intervention.      Dist RCA lesion   Stent (Also treats lesions: Mid RCA to Dist RCA)   See details in Mid RCA to Dist RCA lesion.   There is a 0% residual stenosis post intervention.           Pressures Phase: Baseline     Time Systolic (mmHg) Diastolic (mmHg) Mean (mmHg) A Wave (mmHg) V Wave (mmHg) EDP (mmHg) Max dp/dt (mmHg/sec) HR (bpm) Content (mL/dL) SAT (%)   AO Pressures  2:18     75    101        65                Recent Results (from the past 4320 hour(s))   Echocardiogram Complete   Result Value    LVEF  55-60%    [facility]    [id number]  [id number]  [id number]  [id number]^BESSIE^JORGE^MORENO     [facility]  Echocardiography Laboratory  [address] [place] [id number]     Name: LUCIO VALERIO  MRN: [id number]  : 1935  Study Date: 2023 09:58 AM  Age: 88 yrs  Gender: Female  Patient Location: [facility]  Reason For Study: Coronary artery disease of native artery of native heart  with st  Ordering Physician: JORGE LA  Referring Physician: JORGE LA  Performed By: Gaby Nice     BSA: 1.8 m2  Height: 66 in  Weight: 162 lb  ______________________________________________________________________________  Procedure  Echocardiogram with two-dimensional, color and spectral Doppler performed.  ______________________________________________________________________________  Interpretation Summary  Left ventricular function is normal.The ejection fraction is 55-60%. There is  mild hypokinesis of the basal and mid inferior segments.  The right ventricle is normal size. Global right ventricular function is  normal.  No significant valvular abnormalities.  The estimated PA systolic pressure is 32 mmHg.  IVC diameter  <2.1 cm collapsing >50% with sniff suggests a normal RA pressure  of 3 mmHg.  This study was compared with the study from 5/17/22. No significant changes  noted, but direct visual comparison of the inferior wall motion abnormalities  is difficult due to frequent PVCs on the prior study.     ______________________________________________________________________________  Left Ventricle  Left ventricular function is normal.The ejection fraction is 55-60%. Left  ventricular wall thickness is normal. Left ventricular size is normal. Left  ventricular diastolic function is indeterminate. There is mild hypokinesis of  the basal and mid inferior segments.     Right Ventricle  The right ventricle is normal size. Global right ventricular function is  normal. A pacemaker lead is noted in the right ventricle.     Atria  Moderate biatrial enlargement is present.     Mitral Valve  Mild mitral insufficiency is present. The mitral regurgitation is eccentric  and posteriorly directed. EROA 0.12 cm2.     Aortic Valve  The aortic valve is tricuspid. On Doppler interrogation, there is no  significant stenosis or regurgitation.     Tricuspid Valve  Mild tricuspid insufficiency is present. Right ventricular systolic pressure  is 29mmHg above the right atrial pressure.     Pulmonic Valve  Mild pulmonic insufficiency is present.     Vessels  Annulus 2.8 cm. Ascending aorta 3.1 cm. When indexed to BSA, aortic root is  normal. IVC diameter <2.1 cm collapsing >50% with sniff suggests a normal RA  pressure of 3 mmHg.     Pericardium  No pericardial effusion is present.     Compared to Previous Study  This study was compared with the study from 5/17/22 . No significant changes  noted, but direct visual comparison of the inferior wall motion abnormalities  is difficult due to frequent PVCs on the prior study.     Attestation  I have personally viewed the imaging and agree with the interpretation and  report as documented by the fellow, Karson  Theodora, and/or edited by me.  ______________________________________________________________________________  MMode/2D Measurements & Calculations  IVSd: 1.1 cm  LVIDd: 4.3 cm  LVIDs: 3.0 cm  LVPWd: 0.78 cm  FS: 29.6 %  LV mass(C)d: 126.3 grams  LV mass(C)dI: 69.1 grams/m2  Ao root diam: 2.8 cm  asc Aorta Diam: 3.1 cm  LVOT diam: 2.1 cm  LVOT area: 3.4 cm2  LA Volume (BP): 68.7 ml     LA Volume Index (BP): 37.5 ml/m2  RWT: 0.36     Doppler Measurements & Calculations  MV E max seb: 82.3 cm/sec  MV A max seb: 78.1 cm/sec  MV E/A: 1.1  MV max P.0 mmHg  MV mean P.7 mmHg  MV V2 VTI: 22.5 cm  MV dec time: 0.21 sec  MR PISA: 1.6 cm2  MR ERO: 0.09 cm2  MR volume: 18.9 ml  TR max seb: 267.7 cm/sec  TR max P.7 mmHg  E/E' av.6  Lateral E/e': 9.7  Medial E/e': 13.5  RV S Seb: 11.5 cm/sec     ______________________________________________________________________________  Report approved by: Zehra Perez 2023 11:45 AM               Pacemaker interrogation- 23  CareAlert received for AT/AF Daily [place] > Threshold & 9 hours in AT/AF Since Last Session.  Remote pacemaker transmission received and reviewed. Device transmission sent per MD orders.  Device: Medtronic [id number] [place] XT DR DIA  Normal Device Function  Mode: AAIR>DDDR  bpm  AP: 73.5%  : 0.1%  Presenting EGM: AF with vent rate ~ 100-110 bpm  Short V-V intervals: 0  Lead Trends Appear Stable: Yes  Estimated battery longevity to RRT = 10.1 years  Atrial Arrhythmia: 2 AT/AF episodes recorded - 13 sec - > 9 hours in duration. Episode remains in progress since 23 @ 2132.  AF [place]: 0.4%  Anticoagulant: None - Patient reports she is taking Plavix and ASA.  Ventricular Arrhythmia: 0...

## 2023-07-19 NOTE — ED TRIAGE NOTES
From home for rectal bleeding  Denies N/V  Stool is both dark and bright red  Recent cardiac stent placed Monday, on eliquis and brilanta       Triage Assessment     Row Name 07/19/23 0985       Triage Assessment (Adult)    Airway WDL WDL       Respiratory WDL    Respiratory WDL WDL       Skin Circulation/Temperature WDL    Skin Circulation/Temperature WDL WDL       Cardiac WDL    Cardiac WDL WDL       Peripheral/Neurovascular WDL    Peripheral Neurovascular WDL WDL       Cognitive/Neuro/Behavioral WDL    Cognitive/Neuro/Behavioral WDL WDL

## 2023-07-19 NOTE — PLAN OF CARE
Goal Outcome Evaluation: Ongoing; Progressing.       Plan of Care Reviewed With: patient    Overall Patient Progress: no change    Outcome Evaluation: Continues with blood in stool, no pain.    Reason for admission: Blood in stool  Admitted from:  UED  Report received from:  Alex RN         2 RN skin assessment completed by: Pb MEDINA and Meenu RN      - Findings (add LDA if needed): Scabbing of right shin injury, bruising on right chest, groin and forearms.   Bed algorithm reevaluated: Yes  Was Pulsate ordered?: No  Care plan (primary problem) and education initiated: Yes  Pt informed about policy regarding no IV pumps off unit: Yes  Flu shot ordered? (October-April only): NA  Detailed Belongings: Please see NST note.       RN assumed cares at 1630, Pt alert and oriented upon arrival from ED.  VS stable throughout the shift and patient denies pain.  Medium stool this evening, red per patient.  PIV infusing at 100 mL/hr; positional and leaking 2/2 anticoagulation.  Plan for EGD tomorrow, on clear liquids now, will be NPO at midnight.  No acute incidents this shift.  Continue to monitor and notify MD of any changes.

## 2023-07-19 NOTE — TELEPHONE ENCOUNTER
S-(situation): patient discharged 7/17 post ALVAREZ to distal RCA. She was discharged with ASA, Brilinta and Eliquis. Had talked with her yesterday and asked her to stay on all 3 until she had her follow up appointment with YU Oates NP next week. Plan was to discontinue the ASA.  She is having bloody stools this morning and called the triage line. They directed her go to the ED. I spoke with her and she was getting ready to go.    B-(background): Ms. Tessa Mansfield is a pleasant 88 year old female with medical history pertinent for CAD (PCI at all 3 vessels at various times from [id number], known 80% ISR of LCx), sick sinus syndrome s/p dual-chamber PPM, AF here for coronary angiography due to persistent HIGGINS.    A-(assessment): rectal bleeding    R-(recommendations): sent to ER for bleeding and re assessment of anticoagulations medications

## 2023-07-20 ENCOUNTER — ANESTHESIA EVENT (OUTPATIENT)
Dept: GASTROENTEROLOGY | Facility: CLINIC | Age: 88
DRG: 982 | End: 2023-07-20
Payer: COMMERCIAL

## 2023-07-20 ENCOUNTER — ANESTHESIA (OUTPATIENT)
Dept: GASTROENTEROLOGY | Facility: CLINIC | Age: 88
DRG: 982 | End: 2023-07-20
Payer: COMMERCIAL

## 2023-07-20 LAB
ANION GAP SERPL CALCULATED.3IONS-SCNC: 12 MMOL/L (ref 7–15)
APTT PPP: 37 SECONDS (ref 22–38)
BASOPHILS # BLD MANUAL: 0.1 10E3/UL (ref 0–0.2)
BASOPHILS NFR BLD MANUAL: 1 %
BILIRUB DIRECT SERPL-MCNC: <0.2 MG/DL (ref 0–0.3)
BILIRUB SERPL-MCNC: 0.4 MG/DL
BLD PROD TYP BPU: NORMAL
BLOOD COMPONENT TYPE: NORMAL
BUN SERPL-MCNC: 40.4 MG/DL (ref 8–23)
CALCIUM SERPL-MCNC: 8 MG/DL (ref 8.8–10.2)
CHLORIDE SERPL-SCNC: 114 MMOL/L (ref 98–107)
CODING SYSTEM: NORMAL
CREAT SERPL-MCNC: 1.77 MG/DL (ref 0.51–0.95)
CROSSMATCH: NORMAL
DEPRECATED HCO3 PLAS-SCNC: 18 MMOL/L (ref 22–29)
EOSINOPHIL # BLD MANUAL: 0 10E3/UL (ref 0–0.7)
EOSINOPHIL NFR BLD MANUAL: 0 %
ERYTHROCYTE [DISTWIDTH] IN BLOOD BY AUTOMATED COUNT: 14.9 % (ref 10–15)
ERYTHROCYTE [DISTWIDTH] IN BLOOD BY AUTOMATED COUNT: 14.9 % (ref 10–15)
FERRITIN SERPL-MCNC: 67 NG/ML (ref 11–328)
FIBRINOGEN PPP-MCNC: 388 MG/DL (ref 170–490)
GFR SERPL CREATININE-BSD FRML MDRD: 28 ML/MIN/1.73M2
GLUCOSE SERPL-MCNC: 77 MG/DL (ref 70–99)
HAPTOGLOB SERPL-MCNC: 216 MG/DL (ref 32–197)
HCT VFR BLD AUTO: 23.6 % (ref 35–47)
HCT VFR BLD AUTO: 24 % (ref 35–47)
HGB BLD-MCNC: 7.2 G/DL (ref 11.7–15.7)
HGB BLD-MCNC: 7.2 G/DL (ref 11.7–15.7)
HGB BLD-MCNC: 8.8 G/DL (ref 11.7–15.7)
INR PPP: 1.45 (ref 0.85–1.15)
ISSUE DATE AND TIME: NORMAL
LYMPHOCYTES # BLD MANUAL: 2.1 10E3/UL (ref 0.8–5.3)
LYMPHOCYTES NFR BLD MANUAL: 29 %
MCH RBC QN AUTO: 30 PG (ref 26.5–33)
MCH RBC QN AUTO: 30.5 PG (ref 26.5–33)
MCHC RBC AUTO-ENTMCNC: 30 G/DL (ref 31.5–36.5)
MCHC RBC AUTO-ENTMCNC: 30.5 G/DL (ref 31.5–36.5)
MCV RBC AUTO: 100 FL (ref 78–100)
MCV RBC AUTO: 100 FL (ref 78–100)
MONOCYTES # BLD MANUAL: 0.7 10E3/UL (ref 0–1.3)
MONOCYTES NFR BLD MANUAL: 10 %
NEUTROPHILS # BLD MANUAL: 4.3 10E3/UL (ref 1.6–8.3)
NEUTROPHILS NFR BLD MANUAL: 60 %
PATH REPORT.COMMENTS IMP SPEC: NORMAL
PATH REPORT.COMMENTS IMP SPEC: NORMAL
PATH REPORT.FINAL DX SPEC: NORMAL
PATH REPORT.MICROSCOPIC SPEC OTHER STN: NORMAL
PATH REPORT.MICROSCOPIC SPEC OTHER STN: NORMAL
PATH REPORT.RELEVANT HX SPEC: NORMAL
PLAT MORPH BLD: NORMAL
PLATELET # BLD AUTO: 63 10E3/UL (ref 150–450)
PLATELET # BLD AUTO: 69 10E3/UL (ref 150–450)
POTASSIUM SERPL-SCNC: 3.6 MMOL/L (ref 3.4–5.3)
RBC # BLD AUTO: 2.36 10E6/UL (ref 3.8–5.2)
RBC # BLD AUTO: 2.4 10E6/UL (ref 3.8–5.2)
RBC MORPH BLD: NORMAL
RETICS # AUTO: 0.09 10E6/UL (ref 0.03–0.1)
RETICS/RBC NFR AUTO: 3.8 % (ref 0.5–2)
SODIUM SERPL-SCNC: 144 MMOL/L (ref 136–145)
UNIT ABO/RH: NORMAL
UNIT NUMBER: NORMAL
UNIT STATUS: NORMAL
UNIT TYPE ISBT: 9500
UPPER GI ENDOSCOPY: NORMAL
WBC # BLD AUTO: 6.8 10E3/UL (ref 4–11)
WBC # BLD AUTO: 7.2 10E3/UL (ref 4–11)

## 2023-07-20 PROCEDURE — 999N000111 HC STATISTIC OT IP EVAL DEFER

## 2023-07-20 PROCEDURE — 0BJ18ZZ INSPECTION OF TRACHEA, VIA NATURAL OR ARTIFICIAL OPENING ENDOSCOPIC: ICD-10-PCS | Performed by: INTERNAL MEDICINE

## 2023-07-20 PROCEDURE — 85027 COMPLETE CBC AUTOMATED: CPT | Performed by: STUDENT IN AN ORGANIZED HEALTH CARE EDUCATION/TRAINING PROGRAM

## 2023-07-20 PROCEDURE — 250N000009 HC RX 250: Performed by: NURSE ANESTHETIST, CERTIFIED REGISTERED

## 2023-07-20 PROCEDURE — 43235 EGD DIAGNOSTIC BRUSH WASH: CPT | Performed by: INTERNAL MEDICINE

## 2023-07-20 PROCEDURE — 99232 SBSQ HOSP IP/OBS MODERATE 35: CPT | Performed by: STUDENT IN AN ORGANIZED HEALTH CARE EDUCATION/TRAINING PROGRAM

## 2023-07-20 PROCEDURE — 85045 AUTOMATED RETICULOCYTE COUNT: CPT | Performed by: STUDENT IN AN ORGANIZED HEALTH CARE EDUCATION/TRAINING PROGRAM

## 2023-07-20 PROCEDURE — 85610 PROTHROMBIN TIME: CPT | Performed by: STUDENT IN AN ORGANIZED HEALTH CARE EDUCATION/TRAINING PROGRAM

## 2023-07-20 PROCEDURE — 85018 HEMOGLOBIN: CPT | Performed by: STUDENT IN AN ORGANIZED HEALTH CARE EDUCATION/TRAINING PROGRAM

## 2023-07-20 PROCEDURE — 250N000013 HC RX MED GY IP 250 OP 250 PS 637

## 2023-07-20 PROCEDURE — 999N000157 HC STATISTIC RCP TIME EA 10 MIN

## 2023-07-20 PROCEDURE — 36415 COLL VENOUS BLD VENIPUNCTURE: CPT | Performed by: STUDENT IN AN ORGANIZED HEALTH CARE EDUCATION/TRAINING PROGRAM

## 2023-07-20 PROCEDURE — 999N000147 HC STATISTIC PT IP EVAL DEFER

## 2023-07-20 PROCEDURE — 370N000017 HC ANESTHESIA TECHNICAL FEE, PER MIN: Performed by: INTERNAL MEDICINE

## 2023-07-20 PROCEDURE — 82728 ASSAY OF FERRITIN: CPT | Performed by: STUDENT IN AN ORGANIZED HEALTH CARE EDUCATION/TRAINING PROGRAM

## 2023-07-20 PROCEDURE — 258N000003 HC RX IP 258 OP 636: Performed by: NURSE ANESTHETIST, CERTIFIED REGISTERED

## 2023-07-20 PROCEDURE — C9113 INJ PANTOPRAZOLE SODIUM, VIA: HCPCS | Mod: JZ

## 2023-07-20 PROCEDURE — 120N000002 HC R&B MED SURG/OB UMMC

## 2023-07-20 PROCEDURE — 258N000003 HC RX IP 258 OP 636: Performed by: FAMILY MEDICINE

## 2023-07-20 PROCEDURE — 250N000009 HC RX 250

## 2023-07-20 PROCEDURE — B2111ZZ FLUOROSCOPY OF MULTIPLE CORONARY ARTERIES USING LOW OSMOLAR CONTRAST: ICD-10-PCS | Performed by: INTERNAL MEDICINE

## 2023-07-20 PROCEDURE — 82374 ASSAY BLOOD CARBON DIOXIDE: CPT

## 2023-07-20 PROCEDURE — 85027 COMPLETE CBC AUTOMATED: CPT

## 2023-07-20 PROCEDURE — P9016 RBC LEUKOCYTES REDUCED: HCPCS

## 2023-07-20 PROCEDURE — 85060 BLOOD SMEAR INTERPRETATION: CPT | Performed by: PATHOLOGY

## 2023-07-20 PROCEDURE — 36415 COLL VENOUS BLD VENIPUNCTURE: CPT

## 2023-07-20 PROCEDURE — 85007 BL SMEAR W/DIFF WBC COUNT: CPT | Performed by: STUDENT IN AN ORGANIZED HEALTH CARE EDUCATION/TRAINING PROGRAM

## 2023-07-20 PROCEDURE — 83010 ASSAY OF HAPTOGLOBIN QUANT: CPT | Performed by: STUDENT IN AN ORGANIZED HEALTH CARE EDUCATION/TRAINING PROGRAM

## 2023-07-20 PROCEDURE — 82248 BILIRUBIN DIRECT: CPT | Performed by: STUDENT IN AN ORGANIZED HEALTH CARE EDUCATION/TRAINING PROGRAM

## 2023-07-20 PROCEDURE — 250N000013 HC RX MED GY IP 250 OP 250 PS 637: Performed by: STUDENT IN AN ORGANIZED HEALTH CARE EDUCATION/TRAINING PROGRAM

## 2023-07-20 PROCEDURE — 85730 THROMBOPLASTIN TIME PARTIAL: CPT | Performed by: STUDENT IN AN ORGANIZED HEALTH CARE EDUCATION/TRAINING PROGRAM

## 2023-07-20 PROCEDURE — 94640 AIRWAY INHALATION TREATMENT: CPT | Mod: 76

## 2023-07-20 PROCEDURE — 85384 FIBRINOGEN ACTIVITY: CPT | Performed by: STUDENT IN AN ORGANIZED HEALTH CARE EDUCATION/TRAINING PROGRAM

## 2023-07-20 PROCEDURE — 027035Z DILATION OF CORONARY ARTERY, ONE ARTERY WITH TWO DRUG-ELUTING INTRALUMINAL DEVICES, PERCUTANEOUS APPROACH: ICD-10-PCS | Performed by: INTERNAL MEDICINE

## 2023-07-20 PROCEDURE — 250N000011 HC RX IP 250 OP 636: Mod: JZ

## 2023-07-20 PROCEDURE — 250N000011 HC RX IP 250 OP 636: Performed by: NURSE ANESTHETIST, CERTIFIED REGISTERED

## 2023-07-20 RX ORDER — ONDANSETRON 2 MG/ML
4 INJECTION INTRAMUSCULAR; INTRAVENOUS EVERY 30 MIN PRN
Status: CANCELLED | OUTPATIENT
Start: 2023-07-20

## 2023-07-20 RX ORDER — LIDOCAINE HYDROCHLORIDE 20 MG/ML
INJECTION, SOLUTION INFILTRATION; PERINEURAL PRN
Status: DISCONTINUED | OUTPATIENT
Start: 2023-07-20 | End: 2023-07-20

## 2023-07-20 RX ORDER — PROPOFOL 10 MG/ML
INJECTION, EMULSION INTRAVENOUS PRN
Status: DISCONTINUED | OUTPATIENT
Start: 2023-07-20 | End: 2023-07-20

## 2023-07-20 RX ORDER — ONDANSETRON 2 MG/ML
INJECTION INTRAMUSCULAR; INTRAVENOUS PRN
Status: DISCONTINUED | OUTPATIENT
Start: 2023-07-20 | End: 2023-07-20

## 2023-07-20 RX ORDER — SODIUM CHLORIDE, SODIUM LACTATE, POTASSIUM CHLORIDE, CALCIUM CHLORIDE 600; 310; 30; 20 MG/100ML; MG/100ML; MG/100ML; MG/100ML
INJECTION, SOLUTION INTRAVENOUS CONTINUOUS PRN
Status: DISCONTINUED | OUTPATIENT
Start: 2023-07-20 | End: 2023-07-20

## 2023-07-20 RX ORDER — CLOPIDOGREL BISULFATE 75 MG/1
75 TABLET ORAL DAILY
Status: DISCONTINUED | OUTPATIENT
Start: 2023-07-20 | End: 2023-07-23 | Stop reason: HOSPADM

## 2023-07-20 RX ORDER — FENTANYL CITRATE 50 UG/ML
50 INJECTION, SOLUTION INTRAMUSCULAR; INTRAVENOUS EVERY 5 MIN PRN
Status: CANCELLED | OUTPATIENT
Start: 2023-07-20

## 2023-07-20 RX ORDER — ONDANSETRON 4 MG/1
4 TABLET, ORALLY DISINTEGRATING ORAL EVERY 30 MIN PRN
Status: CANCELLED | OUTPATIENT
Start: 2023-07-20

## 2023-07-20 RX ORDER — SODIUM CHLORIDE, SODIUM LACTATE, POTASSIUM CHLORIDE, CALCIUM CHLORIDE 600; 310; 30; 20 MG/100ML; MG/100ML; MG/100ML; MG/100ML
INJECTION, SOLUTION INTRAVENOUS CONTINUOUS
Status: CANCELLED | OUTPATIENT
Start: 2023-07-20

## 2023-07-20 RX ORDER — FENTANYL CITRATE 50 UG/ML
25 INJECTION, SOLUTION INTRAMUSCULAR; INTRAVENOUS EVERY 5 MIN PRN
Status: CANCELLED | OUTPATIENT
Start: 2023-07-20

## 2023-07-20 RX ORDER — SODIUM CHLORIDE, SODIUM LACTATE, POTASSIUM CHLORIDE, CALCIUM CHLORIDE 600; 310; 30; 20 MG/100ML; MG/100ML; MG/100ML; MG/100ML
INJECTION, SOLUTION INTRAVENOUS CONTINUOUS
Status: DISCONTINUED | OUTPATIENT
Start: 2023-07-20 | End: 2023-07-20 | Stop reason: HOSPADM

## 2023-07-20 RX ORDER — LIDOCAINE 40 MG/G
CREAM TOPICAL
Status: DISCONTINUED | OUTPATIENT
Start: 2023-07-20 | End: 2023-07-20 | Stop reason: HOSPADM

## 2023-07-20 RX ORDER — PROPOFOL 10 MG/ML
INJECTION, EMULSION INTRAVENOUS CONTINUOUS PRN
Status: DISCONTINUED | OUTPATIENT
Start: 2023-07-20 | End: 2023-07-20

## 2023-07-20 RX ADMIN — PANTOPRAZOLE SODIUM 40 MG: 40 INJECTION, POWDER, FOR SOLUTION INTRAVENOUS at 07:42

## 2023-07-20 RX ADMIN — BUDESONIDE 0.5 MG: 0.5 INHALANT RESPIRATORY (INHALATION) at 09:01

## 2023-07-20 RX ADMIN — POLYETHYLENE GLYCOL 3350, SODIUM SULFATE ANHYDROUS, SODIUM BICARBONATE, SODIUM CHLORIDE, POTASSIUM CHLORIDE 4000 ML: 236; 22.74; 6.74; 5.86; 2.97 POWDER, FOR SOLUTION ORAL at 17:09

## 2023-07-20 RX ADMIN — CLOPIDOGREL BISULFATE 75 MG: 75 TABLET ORAL at 17:09

## 2023-07-20 RX ADMIN — ONDANSETRON 4 MG: 2 INJECTION INTRAMUSCULAR; INTRAVENOUS at 11:37

## 2023-07-20 RX ADMIN — LIDOCAINE HYDROCHLORIDE 60 MG: 20 INJECTION, SOLUTION INFILTRATION; PERINEURAL at 11:20

## 2023-07-20 RX ADMIN — TOPICAL ANESTHETIC 1 SPRAY: 200 SPRAY DENTAL; PERIODONTAL at 11:18

## 2023-07-20 RX ADMIN — BUDESONIDE 0.5 MG: 0.5 INHALANT RESPIRATORY (INHALATION) at 20:32

## 2023-07-20 RX ADMIN — FLUTICASONE FUROATE AND VILANTEROL TRIFENATATE 1 PUFF: 100; 25 POWDER RESPIRATORY (INHALATION) at 21:42

## 2023-07-20 RX ADMIN — PANTOPRAZOLE SODIUM 40 MG: 40 INJECTION, POWDER, FOR SOLUTION INTRAVENOUS at 21:42

## 2023-07-20 RX ADMIN — SODIUM CHLORIDE, POTASSIUM CHLORIDE, SODIUM LACTATE AND CALCIUM CHLORIDE: 600; 310; 30; 20 INJECTION, SOLUTION INTRAVENOUS at 11:17

## 2023-07-20 RX ADMIN — ALLOPURINOL 100 MG: 100 TABLET ORAL at 07:42

## 2023-07-20 RX ADMIN — SODIUM CHLORIDE: 9 INJECTION, SOLUTION INTRAVENOUS at 07:41

## 2023-07-20 RX ADMIN — ROSUVASTATIN CALCIUM 20 MG: 10 TABLET, FILM COATED ORAL at 07:42

## 2023-07-20 RX ADMIN — PROPOFOL 20 MG: 10 INJECTION, EMULSION INTRAVENOUS at 11:20

## 2023-07-20 RX ADMIN — CYANOCOBALAMIN TAB 500 MCG 500 MCG: 500 TAB at 07:42

## 2023-07-20 RX ADMIN — PROPOFOL 100 MCG/KG/MIN: 10 INJECTION, EMULSION INTRAVENOUS at 11:20

## 2023-07-20 ASSESSMENT — ACTIVITIES OF DAILY LIVING (ADL)
ADLS_ACUITY_SCORE: 28

## 2023-07-20 ASSESSMENT — ENCOUNTER SYMPTOMS: DYSRHYTHMIAS: 1

## 2023-07-20 NOTE — OR NURSING
Pt tolerated EGD for GIB under MAC, very well. No interventions were done. Report was called to pts floor nurse. Pt to go to PACU prior to returning to the floor. Pt to have colonoscopy tomorrow

## 2023-07-20 NOTE — PHARMACY-ADMISSION MEDICATION HISTORY
Pharmacist Admission Medication History    Admission medication history is complete. The information provided in this note is only as accurate as the sources available at the time of the update.    Medication reconciliation/reorder completed by provider prior to medication history? Yes    Information Source(s): Patient via phone    Pertinent Information: Does not use budesonide nebulizer consistently    Changes made to PTA medication list:      Changed: Imdur from 40 mg TID to 60 mg BID    Allergies reviewed with patient and updates made in EHR: yes    Medication History Completed By: Danis Mckee RPH 7/20/2023 3:59 PM    Prior to Admission medications    Medication Sig Last Dose Taking? Auth Provider Long Term End Date   amLODIPine (NORVASC) 10 MG tablet Take 1 tablet (10 mg) by mouth daily 7/18/2023 Yes Tyrell Santoyo MD Yes    apixaban ANTICOAGULANT (ELIQUIS) 2.5 MG tablet Take 1 tablet (2.5 mg) by mouth 2 times daily 7/18/2023 Yes Randa Ann NP     aspirin 81 MG EC tablet Take 1 tablet (81 mg) by mouth daily for 360 days Start tomorrow. 7/18/2023 Yes Robert Echevarria MD  7/12/24   budesonide (PULMICORT) 0.5 MG/2ML neb solution Take 2 mLs (0.5 mg) by nebulization 2 times daily 7/18/2023 Yes Chad Blair MD Yes    cyanocolbalamin (VITAMIN B-12) 1000 MCG tablet Take 500 mcg by mouth daily As directed 7/18/2023 Yes Reported, Patient     furosemide (LASIX) 20 MG tablet Take 2 tablets (40 mg) by mouth 2 times daily 7/18/2023 Yes Kristi Oates APRN CNP Yes    hydrALAZINE (APRESOLINE) 10 MG tablet Take 3 tablets (30 mg) by mouth 3 times daily 7/18/2023 Yes Kristi Oates APRN CNP Yes    isosorbide mononitrate (ISMO/MONOKET) 20 MG tablet Take 2 tablets (40 mg) by mouth 3 times daily  Patient taking differently: Take 60 mg by mouth 2 times daily 7/18/2023 Yes Tyrell Santoyo MD Yes    metoprolol tartrate (LOPRESSOR) 100 MG tablet Take 1 tablet (100  mg) by mouth 2 times daily Patient needs to see primary provider and have labs for further refills. Please call for an appointment at [phone]. 7/18/2023 Yes Pedro Gomez MD Yes    Multiple Vitamins-Minerals (PRESERVISION AREDS 2+MULTI VIT PO) Take by mouth 2 times daily 7/18/2023 Yes Reported, Patient     omeprazole (PRILOSEC) 40 MG DR capsule Take 1 capsule (40 mg) by mouth daily 7/18/2023 Yes Pedro Gomez MD No    potassium chloride ER (K-TAB/KLOR-CON) 10 MEQ CR tablet TAKE 2 TABLETS BY MOUTH IN THE MORNING AND 1 IN THE EVENING 7/18/2023 Yes Marzena Martin MD     rosuvastatin (CRESTOR) 20 MG tablet Take 1 tablet (20 mg) by mouth daily for 360 days 7/18/2023 Yes Robert Echevarria MD Yes 7/11/24   ticagrelor (BRILINTA) 90 MG tablet Take 1 tablet (90 mg) by mouth 2 times daily for 7 days Start this evening. 7/18/2023 Yes Robert Echevarria MD Yes 7/25/23   VITAMIN D3 25 MCG (1000 UT) tablet TAKE 1 TABLET BY MOUTH ONCE DAILY **DUE  TO  BE  SEEN  FOR  MORE  REFILLS** 7/18/2023 Yes Marzena Martin MD     allopurinol (ZYLOPRIM) 100 MG tablet Take 1 tablet (100 mg) by mouth daily Patient needs to see primary provider and have labs for further refills. Please call for an appointment at [phone]. 7/18/2023  Pedro Gomez MD     fluticasone-salmeterol (ADVAIR) 250-50 MCG/ACT inhaler INHALE 1 DOSE BY MOUTH TWICE DAILY 7/18/2023  Chad Blair MD Yes    timolol maleate (TIMOPTIC) 0.5 % ophthalmic solution INSTILL 1 DROP INTO EACH EYE ONCE DAILY IN THE MORNING 7/18/2023  Reported, Patient

## 2023-07-20 NOTE — ANESTHESIA PREPROCEDURE EVALUATION
Anesthesia Pre-Procedure Evaluation    Patient: Tessa Mansfield   MRN: [id number] : 1935        Procedure : Procedure(s):  Esophagoscopy, gastroscopy, duodenoscopy (EGD), combined          Past Medical History:   Diagnosis Date     CAD (coronary artery disease)      CAD s/p PCI+BMS to MRCA 10/2002, PCI to mLCX 2003, PCI+DESx2 to pLAD 2007, PCI+DESx1 to dRCA 2007, PCI+ALVAREZ to RPDA 2013; on indefinite DAPT  10/27/2002    10/27/2002: AMI s/p PCI+BMS (3.5x18mm Bx velocity) to mRCA 2003: Back pain; PCI+stent to mLCX c/b distal embolization/slow flow 2003: Unstable angina; PCI+stent (Hepacoat velocity) to mLAD 2007: PCI+DESx2 to pLAD (IVUS w/ calcification of LMCA and ulcerated plaque pLAD, 80% dRCA; also had 80% dLCX, too small to stent) 2007: Staged PCI. PCI+DESx1 (3.5x13mm Cypher) to dRCA (indefinite DAPT recommended at this time) 2008: Angina. mLCX stent 70% ISR. LAD and RCA stents patent. Myocardium at risk from LCX felt to be small, medical management preferred to PCI. 11/10/2009: Angina. Findings unchanged from 2008, FFR LAD 0.90. Medical management recommended. 2013: Unstable angina; PCI+ALVAREZ to mPDA. Diagnostic findings: LMCA 40% distal. LAD: pLAD stents patent mLAD 30% ISR dLAD 50% diffuse, D2 diffuse disease. LCX 80% mid ISR. RCA diffuse <30% mid, RPLAs diffuse disease, RPDA 100% occlusion.       Cardiac Pacemaker- [employer], dual chamber- NOT dependant 2007     CKD (chronic kidney disease), stage IV (H)      Hyperlipidemia LDL goal <70      Hypertension      Stented coronary artery 10-    RCA     Stented coronary artery 2003    LCx     Stented coronary artery 2003    LAD     Stented coronary artery 2007    LAD     Stented coronary artery 2007    RCA     Transient complete heart block (H) 2007      Past Surgical History:   Procedure Laterality Date     CHOLECYSTECTOMY       CV CORONARY ANGIOGRAM N/A 2022     "Procedure: Coronary Angiogram;  Surgeon: Constantine Macias MD;  Location:  [facility]     CV PCI N/A 6/17/2022    Procedure: Percutaneous Coronary Intervention;  Surgeon: Constantine Macias MD;  Location:  [facility]     CV RIGHT HEART CATH MEASUREMENTS RECORDED N/A 6/17/2022    Procedure: Right Heart Catheterization;  Surgeon: Constantine Macais MD;  Location:  [facility]     EP PACEMAKER N/A 10/15/2019    Procedure: EP PACEMAKER;  Surgeon: Anthony Zhu MD;  Location:  [facility]     HC PPM INSERTION NEW/REPLACEMENT W/ ATRIAL&VENTRICULAR LEAD  11-     HYSTERECTOMY        Allergies   Allergen Reactions     Bactrim [Sulfamethoxazole W/Trimethoprim] Other (See Comments)     Patient unable to recall     Biaxin [Clarithromycin]      Chlorthalidone Nausea and Vomiting     Clonidine      Codeine Sulfate Itching     Darvon [Propoxyphene Hcl]      Dilaudid [Hydromorphone] Visual Disturbance     Hallucinations       Gabapentin Other (See Comments) and Fatigue     \"felt drunk\"     Indomethacin      Levaquin [Levofloxacin Hemihydrate]      Morphine Sulfate      Percocet [Oxycodone-Acetaminophen] Other (See Comments)     hallucinations     Pregabalin Fatigue     Pregabalin Itching     Other reaction(s): Fatigue     Shrimp Swelling     Spironolactone Other (See Comments)     Dehydrated       Terazosin      Ciprofloxacin Rash     Simvastatin Palpitations     Muscle weakness, leg cramping        Social History     Tobacco Use     Smoking status: Former     Packs/day: 0.30     Years: 15.00     Pack years: 4.50     Types: Cigarettes     Smokeless tobacco: Never     Tobacco comments:     Quit 22+ years ago   Substance Use Topics     Alcohol use: Not on file      Wt Readings from Last 1 Encounters:   07/19/23 73.5 kg (162 lb)        Anesthesia Evaluation            ROS/MED HX  ENT/Pulmonary:       Neurologic:       Cardiovascular:     (+) " hypertension--CAD --stent-7/17/23. 3 Drug Eluting Stent. Taking blood thinners Pt has received instructions: CHF Last EF: 60% dysrhythmias, a-fib and Sick Sinus Syndrome,     METS/Exercise Tolerance:     Hematologic: Comments: HB 8. Receiving transfusion    (+) anemia,     Musculoskeletal:       GI/Hepatic:     (+) GERD,     Renal/Genitourinary:       Endo:       Psychiatric/Substance Use:       Infectious Disease:       Malignancy:       Other:            Physical Exam    Airway        Mallampati: II   TM distance: > 3 FB   Neck ROM: full   Mouth opening: > 3 cm    Respiratory Devices and Support         Dental           Cardiovascular          Rhythm and rate: regular and normal     Pulmonary           breath sounds clear to auscultation           OUTSIDE LABS:  CBC:   Lab Results   Component Value Date    WBC 7.2 07/20/2023    WBC 6.8 07/20/2023    HGB 7.2 (L) 07/20/2023    HGB 7.2 (L) 07/20/2023    HCT 24.0 (L) 07/20/2023    HCT 23.6 (L) 07/20/2023    PLT 69 (L) 07/20/2023    PLT 63 (L) 07/20/2023     BMP:   Lab Results   Component Value Date     07/20/2023     07/19/2023    POTASSIUM 3.6 07/20/2023    POTASSIUM 3.9 07/19/2023    CHLORIDE 114 (H) 07/20/2023    CHLORIDE 107 07/19/2023    CO2 18 (L) 07/20/2023    CO2 21 (L) 07/19/2023    BUN 40.4 (H) 07/20/2023    BUN 48.9 (H) 07/19/2023    CR 1.77 (H) 07/20/2023    CR 2.21 (H) 07/19/2023    GLC 77 07/20/2023     (H) 07/19/2023     COAGS:   Lab Results   Component Value Date    PTT 37 07/20/2023    INR 1.45 (H) 07/20/2023    FIBR 388 07/20/2023     POC:   Lab Results   Component Value Date    BGM 86 01/31/2006     HEPATIC:   Lab Results   Component Value Date    ALBUMIN 4.1 07/19/2023    PROTTOTAL 6.6 07/19/2023    ALT 11 07/19/2023    AST 31 07/19/2023    ALKPHOS 74 07/19/2023    BILITOTAL 0.4 07/20/2023     OTHER:   Lab Results   Component Value Date    ALEXANDRO 8.0 (L) 07/20/2023    PHOS 3.5 06/28/2023    MAG 2.1 08/08/2022    LIPASE 263 (H)  07/31/2013    TSH 3.28 09/29/2021    CRP 3.5 10/01/2021    SED 37 (H) 07/31/2013       Anesthesia Plan    ASA Status:  4   NPO Status:  NPO Appropriate    Anesthesia Type: MAC.     - Reason for MAC: chronic cardiopulmonary disease, immobility needed   Induction: Intravenous.           Consents    Anesthesia Plan(s) and associated risks, benefits, and realistic alternatives discussed. Questions answered and patient/representative(s) expressed understanding.    - Discussed:     - Discussed with:  Patient      - Extended Intubation/Ventilatory Support Discussed: No.      - Patient is DNR/DNI Status: No    Use of blood products discussed: No .     Postoperative Care    Pain management: IV analgesics, Oral pain medications.   PONV prophylaxis: Ondansetron (or other 5HT-3)     Comments:           H&P reviewed: Unable to attach H&P to encounter due to EHR limitations. H&P Update: appropriate H&P reviewed, patient examined. No interval changes since H&P (within 30 days).         Amarilis Barros MD

## 2023-07-20 NOTE — CONSULTS
Occupational Therapy: Orders received. Chart reviewed and discussed with interdisciplinary care team.? Occupational Therapy not indicated due to pt at/near functional baseline -- ambulating with FWW, remains independent with all self care, has 24/7 spousal support at home as needed, and pt reports no concerns for return home after hospital stay.? Defer discharge recommendations to medical team.?Will complete orders.

## 2023-07-20 NOTE — PLAN OF CARE
Physical Therapy: Orders received. Chart reviewed and discussed with care team.? Physical Therapy not indicated due to pt reporting she is moving at baseline function, SBA w/ FWW which she uses for longer distances at baseline, independent with all self-care at baseline, 24/7 spousal support at home, no concerns for return home after hospital stay.? Defer discharge recommendations to care team.? Will complete orders.

## 2023-07-20 NOTE — PROGRESS NOTES
SPIRITUAL HEALTH SERVICES Progress Note  [facility]    Summary:  Pt sleeping.     Plan:  Follow-up per admission request.     Bonita Pink   Intern  Pager [phone]      * [facility] remains available 24/7 for emergent requests/referrals, either by having the switchboard page the on-call  or by entering an ASAP/STAT consult in Epic (this will also page the on-call ). Routine Epic consults receive an initial response within 24 hours.*

## 2023-07-20 NOTE — ANESTHESIA CARE TRANSFER NOTE
Patient: Tessa Mansfield    Procedure: Procedure(s):  Esophagoscopy, gastroscopy, duodenoscopy (EGD), combined       Diagnosis: * No pre-op diagnosis entered *  Diagnosis Additional Information: No value filed.    Anesthesia Type:   MAC     Note:    Oropharynx: oropharynx clear of all foreign objects and spontaneously breathing  Level of Consciousness: awake  Oxygen Supplementation: room air    Independent Airway: airway patency satisfactory and stable  Dentition: dentition unchanged  Vital Signs Stable: post-procedure vital signs reviewed and stable  Report to RN Given: handoff report given  Patient transferred to: PACU  Comments: Stopped in PACU for inpatient MAC assessment. VSS. Report shared with RN on 5B.  Handoff Report: Identifed the Patient, Identified the Reponsible Provider, Reviewed the pertinent medical history, Discussed the surgical course, Reviewed Intra-OP anesthesia mangement and issues during anesthesia, Set expectations for post-procedure period and Allowed opportunity for questions and acknowledgement of understanding      Vitals:  Vitals Value Taken Time   /75 07/20/23 1147   Temp 36.6  C (97.9  F) 07/20/23 1145   Pulse 65 07/20/23 1147   Resp 16 07/20/23 1145   SpO2 98 % 07/20/23 1148   Vitals shown include unvalidated device data.    Electronically Signed By: JI Hanley CRNA  July 20, 2023  11:49 AM

## 2023-07-20 NOTE — PROGRESS NOTES
Cardiology Follow up Note         Assessment and Plan:   Tessa Mansfield is a 88 year old female with PMHx of NSTEMI, CAD s/p many stents including to LAD, left circumflex, and RCA on DAPT, HFpEF (EF 55-60%), sick sinus syndrome s/p dual chamber pacemaker, atrial fibrillation AC with elequis, HTN who was admitted on 7/19 due to blood in her stools.     1. Melena + Hematochezia   2. Acute on chronic anemia   3. CAD s/p stenting to LAD, left circumflex, RCA (most recent placed on 7/17/23) on DAPT  4. Hx of STEMI  5. Sick sinus syndrome s/p dual chamber pacemaker  6.  Paroxysmal atrial fibrillation on AC, [id number]= 6, HAS-BLED= 3 (high risk)   7. HTN  8. HFpEF (55-60%)    Presented with blood in stool, hemoglobin drop from 11 to 8.9. Blood pressure stable at 127/60. Per chart review patient has history of episodes of epistaxis, hemoptysis, GI bleeding with gastric ulcers, and easy bleeding in the past when on plavix + aspirin or on warfarin.  Just had new stent to RCA placed on 7/17/23 as elective procedure due to angina. Has had multiple other stents placed in the past. History of Atrial fibrillation and NSTEMI as well. Currently on apixaban 2.5 mg BID, ticagrelor 90 mg BID, aspirin 81 mg. [id number] of 6 (age, HF, female, HTN, prior MI). History of HFpEF most recent echo with EF of 55-60% with no major valvular abnormalities, PA systolic pressure of 32 mmHg. Pacemaker interrogation from 5/23/23 copied below.    Home cardiac medications: amlodipine 10 mg, aspirin 81 mg, elequis 2.5 mg BID, lasix 20 mg BID, hydralazine 100 mg BID, Imdur 20 mg, metoprolol tartrate 100 mg BID, Crestor 20 mg, ticagrelor 20 mg    Plan to hold AC/AP until GI procedure/bleeding addressed. Then switch ticagrelor to clopidogrel (less potent with lower bleeding risk). Hold aspirin. Ok to interrupt for brief time especially being recent stent is in a less critical position in the distal RCA and it being an elective procedure for angina as  opposed to an NSTEMI. Patient last took aspirin + ticagrelor on morning of 7/18 and last took elequis on evening of 7/18. With EGD not revealing any intervenable sources of bleeding today (7/20) would like to at least start a P2Y12 inhibitor back on.            Recommendations:     - Revising plan from yesterday as patient's EGD did not reveal an intervenable source of bleeding  - Can start Clopidogrel 75 mg today (do not need a loading dose as we want to slowly add the effect back on in the setting of her GI bleed)    Patient discussed with attending, Dr. Howard.  Please do not hesitate to contact our service with any questions or concerns.     José Luis Dang, MS4  [facility]    Resident/Fellow Attestation   I, Jalen Barnes MD, was present with the medical/NAPOLEON student who participated in the service and in the documentation of the note.  I have verified the history and personally performed the physical exam and medical decision making.  I agree with the assessment and plan of care as documented in the note.      Jalen Barnes MD  PGY4  Date of Service (when I saw the patient): 7/20/23           Subjective/Objective:   - No acute events overnight         Medications:     Current Facility-Administered Medications   Medication     allopurinol (ZYLOPRIM) tablet 100 mg     budesonide (PULMICORT) neb solution 0.5 mg     cyanocobalamin (VITAMIN B-12) tablet 500 mcg     fluticasone-vilanterol (BREO ELLIPTA) 100-25 MCG/ACT inhaler 1 puff     lidocaine (LMX4) cream     lidocaine 1 % 0.1-1 mL     melatonin tablet 1 mg     pantoprazole (PROTONIX) IV push injection 40 mg     prochlorperazine (COMPAZINE) injection 5 mg    Or     prochlorperazine (COMPAZINE) tablet 5 mg    Or     prochlorperazine (COMPAZINE) suppository 12.5 mg     rosuvastatin (CRESTOR) tablet 20 mg     senna-docusate (SENOKOT-S/PERICOLACE) 8.6-50 MG per tablet 1 tablet    Or     senna-docusate (SENOKOT-S/PERICOLACE) 8.6-50 MG per tablet 2 tablet      sodium chloride (PF) 0.9% PF flush 3 mL     sodium chloride (PF) 0.9% PF flush 3 mL     [Held by provider] sodium chloride 0.9% infusion            Physical Exam:   VS:  BP (!) 152/75   Pulse 68   Temp 97.9  F (36.6  C) (Oral)   Resp 16   Wt 73.5 kg (162 lb)   SpO2 100%   BMI 26.47 kg/m      Wt:   Wt Readings from Last 2 Encounters:   07/19/23 73.5 kg (162 lb)   07/13/23 73.5 kg (162 lb)      General Appearance: patient comfortable appearing, interactive           Laboratory:   BMP  Recent Labs   Lab 07/20/23  0509 07/19/23  0952 07/17/23  0952    142 143   POTASSIUM 3.6 3.9 4.1   CHLORIDE 114* 107 107   ALEXANDRO 8.0* 8.8 9.2   CO2 18* 21* 22   BUN 40.4* 48.9* 47.1*   CR 1.77* 2.21* 2.23*   GLC 77 102* 97     CBC  Recent Labs   Lab 07/20/23  0735 07/20/23  0509 07/19/23  1908 07/19/23  0952 07/17/23  0952   WBC 7.2 6.8  --  8.6 6.7   RBC 2.40* 2.36*  --  2.96* 3.61*   HGB 7.2* 7.2* 8.0* 8.9* 11.0*   HCT 24.0* 23.6*  --  29.1* 36.3    100  --  98 101*   MCH 30.0 30.5  --  30.1 30.5   MCHC 30.0* 30.5*  --  30.6* 30.3*   RDW 14.9 14.9  --  15.1* 15.0   PLT 69* 63*  --  89* 99*     INR  Recent Labs   Lab 07/20/23  0735 07/19/23  0952 07/17/23  0952   INR 1.45* 1.42* 1.11     LFTs  Recent Labs   Lab 07/20/23  0509 07/19/23  0952   ALKPHOS  --  74   AST  --  31   ALT  --  11   BILITOTAL 0.4 0.5   PROTTOTAL  --  6.6   ALBUMIN  --  4.1      PANCNo lab results found in last 7 days.         Imaging/Procedures/Studies:   No results found. However, due to the size of the patient record, not all encounters were searched. Please check Results Review for a complete set of results.   EGD- 7/20/23  Findings:        The examined esophagus was normal.        Patchy mildly erythematous mucosa without bleeding was found in the        gastric body.        The cardia and gastric fundus were normal on retroflexion.        The duodenal bulb, second portion of the duodenum and third portion of        the duodenum were  normal.                                                                                     Impression:                - No active bleeding or blood products visualized.                          - Normal esophagus.                          - Erythematous mucosa in the gastric body.                          - Normal duodenal bulb, second portion of the duodenum                          and third portion of the duodenum.                          - No specimens collected.   Recommendation:            - Clear liquid diet today.                          - Will discuss with patient whether colonoscopy will                          be within her goals of care.

## 2023-07-20 NOTE — PLAN OF CARE
RN assumed cares at [id number]     Vitals: VSS on room air.  Pain: denies pain.  Neuro: A&Ox4; calm and cooperative.   Cardiac: WDL.   Peripheral neurovascular: WDL.  Respiratory: Lung sounds clear. No respiratory distress noted overnight.  GI/: Continent of bowel & bladder. Voiding adequately. 1x BM overnight; pt reported stool has no red color.   IV/Drains: PIV infusing NS at 100mL/hr.  Skin: No new concerns.  Activity: SBA to bathroom.  Nutrition: NPO at midnight.    Events: No acute events overnight. Pt slept between cares. Q2hr rounding completed. Call light within reach and is able to make needs known.     Plan: EGD scheduled today at 1045.      Goal Outcome Evaluation:      Plan of Care Reviewed With: patient    Overall Patient Progress: no changeOverall Patient Progress: no change    Outcome Evaluation: pt reported no blood in stool, no pain

## 2023-07-20 NOTE — PROGRESS NOTES
[facility]    History and Physical - Medicine Service, [facility] TEAM 1       Date of Admission:  7/19/2023    Assessment & Plan      Tessa Mansfield is a 88 year old female with a past medical history significant for PMH of CAD (PCI at all 3 vessels at various times from 3261-3360) and recent RCA PCI (07/17), sick sinus syndrome s/p dual-chamber PPM, and newly diagnosed atrial fibrillation, hypertension, chronic kidney disease, hyperlipidemia, gout who presented to the emergency with  generalized weakness, bloody stools/melena, concerning for GI bleed and admitted for acute on chronic anemia and coagulopathy, now s/p EGD.     Changes Today:  -- Discussed potential use of Clopidogrel with Cardiology to protect stent; awaiting GI response  -- Underwent EGD today without incident.   -- NPO at midnight    #Melena 2/2 G.I Bleed  #Acute on Chronic Anemia due to above  #General weakness   #Coagulopathy with previous DAPT and S/P PCI stent placement  The patient's presentation including symptoms of general weakness, melena, and acute on chronic anemia --> (Hemoglobin 8.9, baseline of 10.8 to 12.4), INR of 1.42 and PTT of 40. G.I bleed likely in setting of DAPT (Aspirin, briliinta) and Apixaban for atrial fibrillation. Brilinta added s/p PCI on 07/17. Hemodynamically stable on presentation. S/p EGD on 7/20.   -GI consulted, appreciate recs  - Continue IV PPI for now  -Cardiology Consulted: recommend starting Clopidogrel 75 mg daily at this time to provide some protection for newly placed stent.  -Holding PTA Metoprolol, Amlodipine, Furosemide, hydralazine  -PT/OT     #Hypertension  #CKD Stage 4  Creatinine on presentation 2.21; baseline 1.8-2.2.  -CTM renal function  -Holding PTA Amlodipine, Furosemide, Hydralazine, Isosorbide mononitrate in setting of G.I Bleed    #Paroxysmal atrial fibrillation  #SSS s/p dual chamber PPM  [id number] = 5 (HTN, age, CAD, female)  - Holding PTA  "Apixaban and Metoprolol    # Mild mitral insufficiency  # Mild tricuspid insufficiency  Last TTE 7/2023 with EF 55-60%.  -OP Cards f/u 07/31    # Multivessel CAD s/p (PCI at all 3 vessels at various times from 4854-5574, known 80% ISR of LCx)  # RCA PCI (07/17)  -Holding  DAPT: Eliquis + asa   -Continue statin       Diet: Clear Liquid Diet Clear liquid diet   DVT Prophylaxis: holding at this time due to GI bleed.  Bonner Catheter: Not present  Fluids: Sodium chloride 0.9% infusion at 100ml/hr continuous  Lines: None     Cardiac Monitoring: None  Code Status: Full Code      Clinically Significant Risk Factors          # Hypocalcemia: Lowest Ca = 8 mg/dL in last 2 days, will monitor and replace as appropriate      # Coagulation Defect: INR = 1.45 (Ref range: 0.85 - 1.15) and/or PTT = 37 Seconds (Ref range: 22 - 38 Seconds), will monitor for bleeding  # Thrombocytopenia: Lowest platelets = 63 in last 2 days, will monitor for bleeding   # Hypertension: Noted on problem list        # Overweight: Estimated body mass index is 26.47 kg/m  as calculated from the following:    Height as of 6/28/23: 1.666 m (5' 5.59\").    Weight as of this encounter: 73.5 kg (162 lb)., PRESENT ON ADMISSION          Disposition Plan      Expected Discharge Date: 07/22/2023                The patient's care was discussed with the Attending Physician, Dr. Márquez.    Mario Alberto Márquez MD  Medicine Service, [facility] TEAM [address] [place] [address]  Securely message with [employer] (more info)  Text page via [facility] Paging/Directory   See signed in provider for up to date coverage information  [phone] pager  _________________________________________    Subjective:  Currently feels well.  Has no abdominal pain, diarrhea, chest pain, shortness of breath.  Awaiting EGD today.  No questions or concerns.    Objective:  Vital signs: Temp:  [97.4  F (36.3  C)-99  F (37.2  C)] 97.5  F (36.4  C)  Pulse:  [63-71] 70  Resp:  [16-17] " 17  BP: (128-175)/(46-75) 152/61  SpO2:  [95 %-100 %] 98 %  General Appearance: Well developed, well nourished, in no acute distress.   Skin: Normal skin turgor. Large areas of purple discoloration and bruising surrounding antecubital fossa.   HEENT: EOMI intact, anicteric sclera, clear conjunctiva, normal external ear anatomy, normal nose anatomy, no lesions, scars, or masses.  Neck: Supple and symmetric, no masses.  Cardiovascular: RRR, no murmurs, rubs, or gallops.  Lungs: clear to ascultation bilaterally, no rales, wheezes or rhonchi.   Abdomen: Non-tender, non-distended abdomen  Musculoskeletal: Muscle tone normal.  Neurologic: Alert and oriented x 3. Normal affect. CX II-XII grossly intact. Normal sensation.

## 2023-07-20 NOTE — OR NURSING
Patient brought to PACU for inpatient MAC eval; patient did not meet admission criteria to PACU. Anesthesia team provided report to inpatient RN; patient placed on pulse ox. / capno / tele monitor and transported to inpatient bed assignment.

## 2023-07-20 NOTE — PLAN OF CARE
Assumed cares 1095-9639     BP (!) 152/75   Pulse 68   Temp 97.9  F (36.6  C) (Oral)   Resp 16   Wt 73.5 kg (162 lb)   SpO2 100%   BMI 26.47 kg/m       Pain: Patient denied pain.   Neuro: A&Ox4.    Respiratory: Lungs clear and equal, on RA.   Cardiac/Neurovascular: HR and pulse WDL. No numbness or tingling reported. Mild lower extremity edema.   GI/: Adequate urine output. Bloody stools.   Nutrition: NPO this morning, clear liquids this afternoon.   Activity: SBA.   Skin: Right shin scab. Dressing in place.   Lines: 2 PIVS.   Events this shift: Patient got 1 unit of blood today during ENDO procedure. Did not find anything, potential for coloscopy tomorrow. Start bowel prep at 4pm. NPO at midnight.      Plan: Continue with plan of care.     Goal Outcome Evaluation:      Plan of Care Reviewed With: patient    Overall Patient Progress: no changeOverall Patient Progress: no change    Outcome Evaluation: Continue with plan of care.

## 2023-07-20 NOTE — ANESTHESIA POSTPROCEDURE EVALUATION
Patient: Tessa Mansfield    Procedure: Procedure(s):  Esophagoscopy, gastroscopy, duodenoscopy (EGD), combined       Anesthesia Type:  MAC    Note:  Disposition: Inpatient   Postop Pain Control: Uneventful            Sign Out: Well controlled pain   PONV: No   Neuro/Psych: Uneventful            Sign Out: Acceptable/Baseline neuro status   Airway/Respiratory: Uneventful            Sign Out: Acceptable/Baseline resp. status   CV/Hemodynamics: Uneventful            Sign Out: Acceptable CV status; No obvious hypovolemia; No obvious fluid overload   Other NRE:    DID A NON-ROUTINE EVENT OCCUR?            Last vitals:  Vitals Value Taken Time   /75 07/20/23 1147   Temp 36.6  C (97.9  F) 07/20/23 1145   Pulse 65 07/20/23 1147   Resp 16 07/20/23 1145   SpO2 98 % 07/20/23 1148   Vitals shown include unvalidated device data.    Electronically Signed By: Amarilis Barros MD  July 20, 2023  11:57 AM

## 2023-07-21 ENCOUNTER — ANESTHESIA EVENT (OUTPATIENT)
Dept: GASTROENTEROLOGY | Facility: CLINIC | Age: 88
DRG: 982 | End: 2023-07-21
Payer: COMMERCIAL

## 2023-07-21 ENCOUNTER — ANESTHESIA (OUTPATIENT)
Dept: GASTROENTEROLOGY | Facility: CLINIC | Age: 88
DRG: 982 | End: 2023-07-21
Payer: COMMERCIAL

## 2023-07-21 LAB
ABO/RH(D): NORMAL
ANION GAP SERPL CALCULATED.3IONS-SCNC: 12 MMOL/L (ref 7–15)
ANTIBODY SCREEN: NEGATIVE
BUN SERPL-MCNC: 23.4 MG/DL (ref 8–23)
CALCIUM SERPL-MCNC: 8.4 MG/DL (ref 8.8–10.2)
CHLORIDE SERPL-SCNC: 114 MMOL/L (ref 98–107)
COLONOSCOPY: NORMAL
CREAT SERPL-MCNC: 1.49 MG/DL (ref 0.51–0.95)
DEPRECATED HCO3 PLAS-SCNC: 22 MMOL/L (ref 22–29)
ERYTHROCYTE [DISTWIDTH] IN BLOOD BY AUTOMATED COUNT: 15.3 % (ref 10–15)
ERYTHROCYTE [DISTWIDTH] IN BLOOD BY AUTOMATED COUNT: 15.4 % (ref 10–15)
GFR SERPL CREATININE-BSD FRML MDRD: 34 ML/MIN/1.73M2
GLUCOSE SERPL-MCNC: 97 MG/DL (ref 70–99)
HCT VFR BLD AUTO: 24.3 % (ref 35–47)
HCT VFR BLD AUTO: 25.1 % (ref 35–47)
HGB BLD-MCNC: 7.2 G/DL (ref 11.7–15.7)
HGB BLD-MCNC: 7.4 G/DL (ref 11.7–15.7)
HGB BLD-MCNC: 7.6 G/DL (ref 11.7–15.7)
MCH RBC QN AUTO: 29.3 PG (ref 26.5–33)
MCH RBC QN AUTO: 30 PG (ref 26.5–33)
MCHC RBC AUTO-ENTMCNC: 30.3 G/DL (ref 31.5–36.5)
MCHC RBC AUTO-ENTMCNC: 30.5 G/DL (ref 31.5–36.5)
MCV RBC AUTO: 97 FL (ref 78–100)
MCV RBC AUTO: 98 FL (ref 78–100)
PLATELET # BLD AUTO: 56 10E3/UL (ref 150–450)
PLATELET # BLD AUTO: 61 10E3/UL (ref 150–450)
POTASSIUM SERPL-SCNC: 3.1 MMOL/L (ref 3.4–5.3)
RBC # BLD AUTO: 2.47 10E6/UL (ref 3.8–5.2)
RBC # BLD AUTO: 2.59 10E6/UL (ref 3.8–5.2)
SODIUM SERPL-SCNC: 148 MMOL/L (ref 136–145)
SPECIMEN EXPIRATION DATE: NORMAL
WBC # BLD AUTO: 8.1 10E3/UL (ref 4–11)
WBC # BLD AUTO: 8.6 10E3/UL (ref 4–11)

## 2023-07-21 PROCEDURE — 250N000013 HC RX MED GY IP 250 OP 250 PS 637

## 2023-07-21 PROCEDURE — 86850 RBC ANTIBODY SCREEN: CPT

## 2023-07-21 PROCEDURE — 36415 COLL VENOUS BLD VENIPUNCTURE: CPT

## 2023-07-21 PROCEDURE — 250N000011 HC RX IP 250 OP 636: Mod: JZ

## 2023-07-21 PROCEDURE — 370N000017 HC ANESTHESIA TECHNICAL FEE, PER MIN: Performed by: INTERNAL MEDICINE

## 2023-07-21 PROCEDURE — 258N000003 HC RX IP 258 OP 636: Performed by: ANESTHESIOLOGY

## 2023-07-21 PROCEDURE — 0W3P8ZZ CONTROL BLEEDING IN GASTROINTESTINAL TRACT, VIA NATURAL OR ARTIFICIAL OPENING ENDOSCOPIC: ICD-10-PCS | Performed by: INTERNAL MEDICINE

## 2023-07-21 PROCEDURE — 120N000002 HC R&B MED SURG/OB UMMC

## 2023-07-21 PROCEDURE — 80048 BASIC METABOLIC PNL TOTAL CA: CPT

## 2023-07-21 PROCEDURE — 86901 BLOOD TYPING SEROLOGIC RH(D): CPT

## 2023-07-21 PROCEDURE — 45382 COLONOSCOPY W/CONTROL BLEED: CPT | Performed by: INTERNAL MEDICINE

## 2023-07-21 PROCEDURE — 250N000011 HC RX IP 250 OP 636: Mod: JZ | Performed by: NURSE ANESTHETIST, CERTIFIED REGISTERED

## 2023-07-21 PROCEDURE — 258N000003 HC RX IP 258 OP 636: Performed by: NURSE ANESTHETIST, CERTIFIED REGISTERED

## 2023-07-21 PROCEDURE — 250N000011 HC RX IP 250 OP 636: Mod: JZ | Performed by: ANESTHESIOLOGY

## 2023-07-21 PROCEDURE — 85027 COMPLETE CBC AUTOMATED: CPT

## 2023-07-21 PROCEDURE — 85018 HEMOGLOBIN: CPT

## 2023-07-21 PROCEDURE — 250N000013 HC RX MED GY IP 250 OP 250 PS 637: Performed by: STUDENT IN AN ORGANIZED HEALTH CARE EDUCATION/TRAINING PROGRAM

## 2023-07-21 PROCEDURE — 250N000009 HC RX 250: Performed by: NURSE ANESTHETIST, CERTIFIED REGISTERED

## 2023-07-21 PROCEDURE — 85014 HEMATOCRIT: CPT

## 2023-07-21 PROCEDURE — C9113 INJ PANTOPRAZOLE SODIUM, VIA: HCPCS | Mod: JZ

## 2023-07-21 PROCEDURE — 250N000009 HC RX 250

## 2023-07-21 RX ORDER — PANTOPRAZOLE SODIUM 40 MG/1
40 TABLET, DELAYED RELEASE ORAL
Status: DISCONTINUED | OUTPATIENT
Start: 2023-07-21 | End: 2023-07-22

## 2023-07-21 RX ORDER — HYDROMORPHONE HYDROCHLORIDE 1 MG/ML
0.2 INJECTION, SOLUTION INTRAMUSCULAR; INTRAVENOUS; SUBCUTANEOUS EVERY 5 MIN PRN
Status: DISCONTINUED | OUTPATIENT
Start: 2023-07-21 | End: 2023-07-23 | Stop reason: HOSPADM

## 2023-07-21 RX ORDER — PROPOFOL 10 MG/ML
INJECTION, EMULSION INTRAVENOUS CONTINUOUS PRN
Status: DISCONTINUED | OUTPATIENT
Start: 2023-07-21 | End: 2023-07-21

## 2023-07-21 RX ORDER — FENTANYL CITRATE 50 UG/ML
25 INJECTION, SOLUTION INTRAMUSCULAR; INTRAVENOUS EVERY 5 MIN PRN
Status: DISCONTINUED | OUTPATIENT
Start: 2023-07-21 | End: 2023-07-23 | Stop reason: HOSPADM

## 2023-07-21 RX ORDER — POTASSIUM CHLORIDE 7.45 MG/ML
10 INJECTION INTRAVENOUS
Status: COMPLETED | OUTPATIENT
Start: 2023-07-21 | End: 2023-07-21

## 2023-07-21 RX ORDER — METOPROLOL TARTRATE 1 MG/ML
INJECTION, SOLUTION INTRAVENOUS PRN
Status: DISCONTINUED | OUTPATIENT
Start: 2023-07-21 | End: 2023-07-21

## 2023-07-21 RX ORDER — LIDOCAINE HYDROCHLORIDE 20 MG/ML
INJECTION, SOLUTION INFILTRATION; PERINEURAL PRN
Status: DISCONTINUED | OUTPATIENT
Start: 2023-07-21 | End: 2023-07-21

## 2023-07-21 RX ORDER — ONDANSETRON 4 MG/1
4 TABLET, ORALLY DISINTEGRATING ORAL EVERY 30 MIN PRN
Status: DISCONTINUED | OUTPATIENT
Start: 2023-07-21 | End: 2023-07-23 | Stop reason: HOSPADM

## 2023-07-21 RX ORDER — SODIUM CHLORIDE, SODIUM LACTATE, POTASSIUM CHLORIDE, CALCIUM CHLORIDE 600; 310; 30; 20 MG/100ML; MG/100ML; MG/100ML; MG/100ML
INJECTION, SOLUTION INTRAVENOUS CONTINUOUS
Status: DISCONTINUED | OUTPATIENT
Start: 2023-07-21 | End: 2023-07-21

## 2023-07-21 RX ORDER — HYDROMORPHONE HYDROCHLORIDE 1 MG/ML
0.4 INJECTION, SOLUTION INTRAMUSCULAR; INTRAVENOUS; SUBCUTANEOUS EVERY 5 MIN PRN
Status: DISCONTINUED | OUTPATIENT
Start: 2023-07-21 | End: 2023-07-23 | Stop reason: HOSPADM

## 2023-07-21 RX ORDER — FENTANYL CITRATE 50 UG/ML
50 INJECTION, SOLUTION INTRAMUSCULAR; INTRAVENOUS EVERY 5 MIN PRN
Status: DISCONTINUED | OUTPATIENT
Start: 2023-07-21 | End: 2023-07-23 | Stop reason: HOSPADM

## 2023-07-21 RX ORDER — ONDANSETRON 2 MG/ML
4 INJECTION INTRAMUSCULAR; INTRAVENOUS EVERY 30 MIN PRN
Status: DISCONTINUED | OUTPATIENT
Start: 2023-07-21 | End: 2023-07-23 | Stop reason: HOSPADM

## 2023-07-21 RX ORDER — SODIUM CHLORIDE, SODIUM LACTATE, POTASSIUM CHLORIDE, CALCIUM CHLORIDE 600; 310; 30; 20 MG/100ML; MG/100ML; MG/100ML; MG/100ML
INJECTION, SOLUTION INTRAVENOUS CONTINUOUS PRN
Status: DISCONTINUED | OUTPATIENT
Start: 2023-07-21 | End: 2023-07-21

## 2023-07-21 RX ORDER — ONDANSETRON 2 MG/ML
INJECTION INTRAMUSCULAR; INTRAVENOUS PRN
Status: DISCONTINUED | OUTPATIENT
Start: 2023-07-21 | End: 2023-07-21

## 2023-07-21 RX ORDER — SODIUM CHLORIDE AND POTASSIUM CHLORIDE 150; 900 MG/100ML; MG/100ML
INJECTION, SOLUTION INTRAVENOUS CONTINUOUS
Status: DISCONTINUED | OUTPATIENT
Start: 2023-07-21 | End: 2023-07-21

## 2023-07-21 RX ORDER — PROPOFOL 10 MG/ML
INJECTION, EMULSION INTRAVENOUS PRN
Status: DISCONTINUED | OUTPATIENT
Start: 2023-07-21 | End: 2023-07-21

## 2023-07-21 RX ADMIN — BUDESONIDE 0.5 MG: 0.5 INHALANT RESPIRATORY (INHALATION) at 20:59

## 2023-07-21 RX ADMIN — SODIUM CHLORIDE, POTASSIUM CHLORIDE, SODIUM LACTATE AND CALCIUM CHLORIDE: 600; 310; 30; 20 INJECTION, SOLUTION INTRAVENOUS at 10:35

## 2023-07-21 RX ADMIN — PROPOFOL 20 MG: 10 INJECTION, EMULSION INTRAVENOUS at 07:23

## 2023-07-21 RX ADMIN — CLOPIDOGREL BISULFATE 75 MG: 75 TABLET ORAL at 10:35

## 2023-07-21 RX ADMIN — POTASSIUM CHLORIDE 10 MEQ: 7.46 INJECTION, SOLUTION INTRAVENOUS at 08:05

## 2023-07-21 RX ADMIN — POLYETHYLENE GLYCOL 3350, SODIUM SULFATE ANHYDROUS, SODIUM BICARBONATE, SODIUM CHLORIDE, POTASSIUM CHLORIDE 2000 ML: 236; 22.74; 6.74; 5.86; 2.97 POWDER, FOR SOLUTION ORAL at 00:28

## 2023-07-21 RX ADMIN — CYANOCOBALAMIN TAB 500 MCG 500 MCG: 500 TAB at 10:35

## 2023-07-21 RX ADMIN — ROSUVASTATIN CALCIUM 20 MG: 10 TABLET, FILM COATED ORAL at 10:35

## 2023-07-21 RX ADMIN — LIDOCAINE HYDROCHLORIDE 60 MG: 20 INJECTION, SOLUTION INFILTRATION; PERINEURAL at 07:53

## 2023-07-21 RX ADMIN — POTASSIUM CHLORIDE 10 MEQ: 7.46 INJECTION, SOLUTION INTRAVENOUS at 08:45

## 2023-07-21 RX ADMIN — METOPROLOL TARTRATE 3 MG: 5 INJECTION INTRAVENOUS at 08:30

## 2023-07-21 RX ADMIN — PANTOPRAZOLE SODIUM 40 MG: 40 TABLET, DELAYED RELEASE ORAL at 18:00

## 2023-07-21 RX ADMIN — PROPOFOL 20 MG: 10 INJECTION, EMULSION INTRAVENOUS at 08:17

## 2023-07-21 RX ADMIN — SODIUM CHLORIDE, POTASSIUM CHLORIDE, SODIUM LACTATE AND CALCIUM CHLORIDE: 600; 310; 30; 20 INJECTION, SOLUTION INTRAVENOUS at 07:48

## 2023-07-21 RX ADMIN — PROPOFOL 100 MCG/KG/MIN: 10 INJECTION, EMULSION INTRAVENOUS at 08:07

## 2023-07-21 RX ADMIN — METOPROLOL TARTRATE 2 MG: 5 INJECTION INTRAVENOUS at 08:26

## 2023-07-21 RX ADMIN — ALLOPURINOL 100 MG: 100 TABLET ORAL at 10:35

## 2023-07-21 RX ADMIN — ONDANSETRON 4 MG: 2 INJECTION INTRAMUSCULAR; INTRAVENOUS at 07:53

## 2023-07-21 RX ADMIN — FLUTICASONE FUROATE AND VILANTEROL TRIFENATATE 1 PUFF: 100; 25 POWDER RESPIRATORY (INHALATION) at 20:15

## 2023-07-21 RX ADMIN — PANTOPRAZOLE SODIUM 40 MG: 40 INJECTION, POWDER, FOR SOLUTION INTRAVENOUS at 10:35

## 2023-07-21 RX ADMIN — PROPOFOL 30 MG: 10 INJECTION, EMULSION INTRAVENOUS at 08:07

## 2023-07-21 ASSESSMENT — ACTIVITIES OF DAILY LIVING (ADL)
ADLS_ACUITY_SCORE: 28

## 2023-07-21 ASSESSMENT — ENCOUNTER SYMPTOMS: DYSRHYTHMIAS: 1

## 2023-07-21 NOTE — ANESTHESIA PREPROCEDURE EVALUATION
Anesthesia Pre-Procedure Evaluation    Patient: Tessa Mansfield   MRN: [id number] : 1935        Procedure : Procedure(s):  Colonoscopy          Tessa Mansfield is a 88 year old female with a past medical history significant for PMH of CAD (PCI at all 3 vessels at various times from 3024-3688) and recent RCA PCI (), sick sinus syndrome s/p dual-chamber PPM, and newly diagnosed atrial fibrillation, hypertension, chronic kidney disease, hyperlipidemia, gout who presented to the emergency with  generalized weakness, bloody stools/melena, concerning for GI bleed and admitted for acute on chronic anemia and coagulopathy.  Potassium this morning is 3.1.  The GI bleed is presumed 2/2 to daul antiplatelet therapy plus apixaban in the context of a newly placed cardiac stent.  Home meds of amlodipine, lasix, hydralazxine, and isosorbide mononitrate are being held.    Past Medical History:   Diagnosis Date     CAD (coronary artery disease)      CAD s/p PCI+BMS to MRCA 10/2002, PCI to mLCX 2003, PCI+DESx2 to pLAD 2007, PCI+DESx1 to dRCA 2007, PCI+ALVAREZ to RPDA 2013; on indefinite DAPT  10/27/2002    10/27/2002: AMI s/p PCI+BMS (3.5x18mm Bx velocity) to mRCA 2003: Back pain; PCI+stent to mLCX c/b distal embolization/slow flow 2003: Unstable angina; PCI+stent (Hepacoat velocity) to mLAD 2007: PCI+DESx2 to pLAD (IVUS w/ calcification of LMCA and ulcerated plaque pLAD, 80% dRCA; also had 80% dLCX, too small to stent) 2007: Staged PCI. PCI+DESx1 (3.5x13mm Cypher) to dRCA (indefinite DAPT recommended at this time) 2008: Angina. mLCX stent 70% ISR. LAD and RCA stents patent. Myocardium at risk from LCX felt to be small, medical management preferred to PCI. 11/10/2009: Angina. Findings unchanged from 2008, FFR LAD 0.90. Medical management recommended. 2013: Unstable angina; PCI+ALVAREZ to mPDA. Diagnostic findings: LMCA 40% distal. LAD: pLAD stents patent mLAD 30% ISR dLAD 50% diffuse,  "D2 diffuse disease. LCX 80% mid ISR. RCA diffuse <30% mid, RPLAs diffuse disease, RPDA 100% occlusion.       Cardiac Pacemaker- Medtronic, dual chamber- NOT dependant 11/28/2007     CKD (chronic kidney disease), stage IV (H)      Hyperlipidemia LDL goal <70      Hypertension      Stented coronary artery 10-    RCA     Stented coronary artery 2-5-2003    LCx     Stented coronary artery 4-    LAD     Stented coronary artery 11-    LAD     Stented coronary artery 12-    RCA     Transient complete heart block (H) 11/28/2007      Past Surgical History:   Procedure Laterality Date     CHOLECYSTECTOMY       CV CORONARY ANGIOGRAM N/A 6/17/2022    Procedure: Coronary Angiogram;  Surgeon: Constantine Macias MD;  Location:  HEART CARDIAC CATH LAB     CV PCI N/A 6/17/2022    Procedure: Percutaneous Coronary Intervention;  Surgeon: Constantine Macias MD;  Location:  HEART CARDIAC CATH LAB     CV RIGHT HEART CATH MEASUREMENTS RECORDED N/A 6/17/2022    Procedure: Right Heart Catheterization;  Surgeon: Constantine Macias MD;  Location:  HEART CARDIAC CATH LAB     EP PACEMAKER N/A 10/15/2019    Procedure: EP PACEMAKER;  Surgeon: Anthony Zhu MD;  Location:  HEART CARDIAC CATH LAB     HC PPM INSERTION NEW/REPLACEMENT W/ ATRIAL&VENTRICULAR LEAD  11-     HYSTERECTOMY        Allergies   Allergen Reactions     Bactrim [Sulfamethoxazole W/Trimethoprim] Other (See Comments)     Patient unable to recall     Biaxin [Clarithromycin]      Chlorthalidone Nausea and Vomiting     Clonidine      Codeine Sulfate Itching     Darvon [Propoxyphene Hcl]      Dilaudid [Hydromorphone] Visual Disturbance     Hallucinations       Gabapentin Other (See Comments) and Fatigue     \"felt drunk\"     Indomethacin      Levaquin [Levofloxacin Hemihydrate]      Morphine Sulfate      Percocet [Oxycodone-Acetaminophen] Other (See Comments)     hallucinations     Pregabalin Fatigue     Pregabalin Itching    "  Other reaction(s): Fatigue     Shrimp Swelling     Spironolactone Other (See Comments)     Dehydrated       Terazosin      Ciprofloxacin Rash     Simvastatin Palpitations     Muscle weakness, leg cramping        Social History     Tobacco Use     Smoking status: Former     Packs/day: 0.30     Years: 15.00     Pack years: 4.50     Types: Cigarettes     Smokeless tobacco: Never     Tobacco comments:     Quit 22+ years ago   Substance Use Topics     Alcohol use: Not on file      Wt Readings from Last 1 Encounters:   07/20/23 76.3 kg (168 lb 3.4 oz)        Anesthesia Evaluation            ROS/MED HX  ENT/Pulmonary:       Neurologic:       Cardiovascular:     (+) hypertension--CAD --stent-7/17/23. 3 Drug Eluting Stent. Taking blood thinners Pt has received instructions: CHF Last EF: 60% dysrhythmias, a-fib and Sick Sinus Syndrome,     METS/Exercise Tolerance:     Hematologic: Comments: HB 8. Receiving transfusion    (+) anemia,     Musculoskeletal:       GI/Hepatic:     (+) GERD,     Renal/Genitourinary:       Endo:       Psychiatric/Substance Use:       Infectious Disease:       Malignancy:       Other:            Physical Exam    Airway        Mallampati: III   TM distance: > 3 FB   Neck ROM: full   Mouth opening: > 3 cm    Respiratory Devices and Support         Dental       (+) Modest Abnormalities - crowns, retainers, 1 or 2 missing teeth      Cardiovascular          Rhythm and rate: irregular and normal     Pulmonary   pulmonary exam normal                OUTSIDE LABS:  CBC:   Lab Results   Component Value Date    WBC 8.6 07/21/2023    WBC 7.2 07/20/2023    HGB 7.6 (L) 07/21/2023    HGB 8.8 (L) 07/20/2023    HCT 25.1 (L) 07/21/2023    HCT 24.0 (L) 07/20/2023    PLT 61 (L) 07/21/2023    PLT 69 (L) 07/20/2023     BMP:   Lab Results   Component Value Date     (H) 07/21/2023     07/20/2023    POTASSIUM 3.1 (L) 07/21/2023    POTASSIUM 3.6 07/20/2023    CHLORIDE 114 (H) 07/21/2023    CHLORIDE 114 (H)  07/20/2023    CO2 22 07/21/2023    CO2 18 (L) 07/20/2023    BUN 23.4 (H) 07/21/2023    BUN 40.4 (H) 07/20/2023    CR 1.49 (H) 07/21/2023    CR 1.77 (H) 07/20/2023    GLC 97 07/21/2023    GLC 77 07/20/2023     COAGS:   Lab Results   Component Value Date    PTT 37 07/20/2023    INR 1.45 (H) 07/20/2023    FIBR 388 07/20/2023     POC:   Lab Results   Component Value Date    BGM 86 01/31/2006     HEPATIC:   Lab Results   Component Value Date    ALBUMIN 4.1 07/19/2023    PROTTOTAL 6.6 07/19/2023    ALT 11 07/19/2023    AST 31 07/19/2023    ALKPHOS 74 07/19/2023    BILITOTAL 0.4 07/20/2023     OTHER:   Lab Results   Component Value Date    ALEXANDRO 8.4 (L) 07/21/2023    PHOS 3.5 06/28/2023    MAG 2.1 08/08/2022    LIPASE 263 (H) 07/31/2013    TSH 3.28 09/29/2021    CRP 3.5 10/01/2021    SED 37 (H) 07/31/2013       Anesthesia Plan    ASA Status:  3   NPO Status:  NPO Appropriate    Anesthesia Type: MAC.     - Reason for MAC: chronic cardiopulmonary disease, immobility needed, straight local not clinically adequate   Induction: Intravenous.           Consents    Anesthesia Plan(s) and associated risks, benefits, and realistic alternatives discussed. Questions answered and patient/representative(s) expressed understanding.     - Discussed: Risks, Benefits and Alternatives for BOTH SEDATION and the PROCEDURE were discussed     - Discussed with:  Patient      - Extended Intubation/Ventilatory Support Discussed: No.      - Patient is DNR/DNI Status: No    Use of blood products discussed: No .     Postoperative Care    Pain management: IV analgesics.   PONV prophylaxis: Ondansetron (or other 5HT-3), Dexamethasone or Solumedrol     Comments:           H&P reviewed: Unable to attach H&P to encounter due to EHR limitations. H&P Update: appropriate H&P reviewed, patient examined. No interval changes since H&P (within 30 days).         Olvin Lewis MD

## 2023-07-21 NOTE — ANESTHESIA POSTPROCEDURE EVALUATION
Patient: Tessa Mansfield    Procedure: Procedure(s):  COLONOSCOPY, WITH HEMORRHAGE CONTROL       Anesthesia Type:  MAC    Note:  Disposition: Inpatient   Postop Pain Control: Uneventful            Sign Out: Well controlled pain   PONV: No   Neuro/Psych: Uneventful            Sign Out: Acceptable/Baseline neuro status   Airway/Respiratory: Uneventful            Sign Out: Acceptable/Baseline resp. status   CV/Hemodynamics: Uneventful            Sign Out: Acceptable CV status; No obvious hypovolemia; No obvious fluid overload   Other NRE: NONE   DID A NON-ROUTINE EVENT OCCUR? No           Last vitals:  Vitals Value Taken Time   /58 07/21/23 0957   Temp 36.3  C (97.4  F) 07/21/23 0957   Pulse 72 07/21/23 0957   Resp 16 07/21/23 0957   SpO2 92 % 07/21/23 0957       Electronically Signed By: Olvin Lewis MD  July 21, 2023  10:21 AM

## 2023-07-21 NOTE — PROVIDER NOTIFICATION
5B [id number]   Radha Sou[place] RN [id number]  Pt with low K+ this AM (3.1) Erick prep overnight for colonoscopy this AM. No RN managed orders in place. Thanks    Radha ESQUEDA text paged at [phone] via [facility]

## 2023-07-21 NOTE — PLAN OF CARE
ASSUMED CARES: 2948-0615   STATUS: Pt admitted 7/19 for GIB- Bloody stools. Anemia. CAD with stent placement. Afib.   NEURO: A/o x 4  VS: VSS on RA  ACTIVITY: Up ad court to commode.   PAIN: Denies  CARDIAC: Denies CP  RESP: Denies SOB  GI/: Voiding spontaneously. Multiple loose stools-Golytley prep overnight.   DIET: NPO since MN  SKIN: R shin wound- Dressing CDI  LDA'S: R PIV x 2- SL.   LABS: Hgb 7.6 (8.8).  Plt 61 (69).   CHANGES THIS SHIFT: Pt up for majority of night d/t Golytley prep. Pt finished 4L prep prior to arrival and than completed 2L prep overnight.   POC: Cont with POC. Plan for colonoscopy today at 0800. Plan for discharge TBD. Call light within reach.

## 2023-07-21 NOTE — ANESTHESIA CARE TRANSFER NOTE
Patient: Tessa Mansfield    Procedure: Procedure(s):  COLONOSCOPY, WITH HEMORRHAGE CONTROL       Diagnosis: GI bleed [K92.2]  Diagnosis Additional Information: No value filed.    Anesthesia Type:   MAC     Note:    Oropharynx: oropharynx clear of all foreign objects and spontaneously breathing  Level of Consciousness: awake  Oxygen Supplementation: room air    Independent Airway: airway patency satisfactory and stable  Dentition: dentition unchanged  Vital Signs Stable: post-procedure vital signs reviewed and stable  Report to RN Given: handoff report given  Patient transferred to: PACU    Handoff Report: Identifed the Patient, Identified the Reponsible Provider, Reviewed the pertinent medical history, Discussed the surgical course, Reviewed Intra-OP anesthesia mangement and issues during anesthesia, Set expectations for post-procedure period and Allowed opportunity for questions and acknowledgement of understanding      Vitals:  Vitals Value Taken Time   /64 07/21/23 0913   Temp 36.4  C (97.6  F) 07/21/23 0913   Pulse 63 07/21/23 0913   Resp 18 07/21/23 0913   SpO2 99 % 07/21/23 0913   Vitals shown include unvalidated device data.    Electronically Signed By: Uma Keenan, APRN CRNA  July 21, 2023  9:14 AM

## 2023-07-21 NOTE — OR NURSING
Patient brought to PACU for inpatient MAC eval; patient did not met admission criteria to PACU. Anesthesia team provided report to inpatient RN; patient placed on pulse ox. / capno monitor and transported to inpatient bed assignment.

## 2023-07-21 NOTE — PLAN OF CARE
Cares 1500 to 2300    BP (!) 142/55 (BP Location: Left arm)   Pulse 70   Temp 97.5  F (36.4  C) (Oral)   Resp 16   Wt 76.3 kg (168 lb 3.4 oz)   SpO2 96%   BMI 27.49 kg/m      VSS ex hypertensive. Alert & oriented x4. Denies pain. Up independent to bedside commode. Needs SBA to bathroom. Pt took 4L golytely bowel prep this evening, tolerated it well. Pt admitted for bloody stools which she was still having this evening. Light brownish stool now without blood per pt. NPO at midnight except for 2L bowel prep at midnight until stools are clear.     Goal Outcome Evaluation:    Plan of Care Reviewed With: patient  Overall Patient Progress: no changeOverall Patient Progress: no change  Outcome Evaluation: Bowel prep given. Colonoscopy tomorrow.

## 2023-07-21 NOTE — OR NURSING
Pt tolerated colonoscopy with hemorrhage control, very well, under MAC. Total of 3 16mm resolution clips were placed in the cecum. Pt taken to PACU priior to returning to PCU. Report was called to pts floor nurse. Card wa placed in pts chart for her to keep regarding the metal clips in her colon.

## 2023-07-21 NOTE — PLAN OF CARE
Cares 1500 to 1900    Goal Outcome Evaluation:    Plan of Care Reviewed With: patient    Overall Patient Progress: improvingOverall Patient Progress: improving    Outcome Evaluation: Pt tolerating regular diet well. Loss of both L PIVs. Pt declined getting new PIV until after hemoglobin draw at 2100. Provider aware.

## 2023-07-21 NOTE — PROVIDER NOTIFICATION
Provider notified (name): Hemal Humphrey MD  Reason for notification: Colonoscopy done today. Loss of 2 PIVs. Pt eating/drinking well. Ok to d/c LR? Pt wants to wait for Hemoglobin check @2100 before getting new PIV.     Response from provider:

## 2023-07-21 NOTE — PLAN OF CARE
SHIFT: 4890-6030    EVENTS: NPO this AM, down for colonoscopy w/ clip placements at start of shift. Diet advanced to regular, tolerating so far. No BM since colonoscopy this AM    Temp: 97.5  F (36.4  C) Temp src: Oral BP: (!) 139/39 Pulse: 86   Resp: 16 SpO2: 96 % O2 Device: None (Room air)       NEURO: AOx4  PAIN: Denies  CARD: Elevated BP within parameters   RESP: Stable on RA  GI/: Advanced to regular diet, tolerating. No BM this shift. Voiding spontaneously   MUSC: Ax1 w/ FWW  SKIN: No new deficits    LDAs: R PIV x2, dry blood noted under dressings, AC running LR at 100mL/hr     PLAN: Monitor for s/s of bleeding. Likely to restart anticoagulation therapy      Goal Outcome Evaluation:  Plan of Care Reviewed With: patient  Overall Patient Progress: improving  Outcome Evaluation: NPO in AM, colonoscopy done. Advanced to regular diet, tolerating well. Plts 43, hgb 7.4. No BM yet

## 2023-07-21 NOTE — PROGRESS NOTES
"SPIRITUAL HEALTH SERVICES Progress Note  [facility] ([place]) 5B    Summary:  I spoke briefly with Tessa who said she is \"hoping to speak with a .\" She said she would \"just like to talk with him\" and does not have any sacramental requests at this time.     Plan:  I will follow-up with Fr Carter and request he visit at his earliest availability.      Bonita Pink   Intern  Pager [phone]      * [facility] remains available 24/7 for emergent requests/referrals, either by having the switchboard page the on-call  or by entering an ASAP/STAT consult in Epic (this will also page the on-call ). Routine Epic consults receive an initial response within 24 hours.*    "

## 2023-07-21 NOTE — SIGNIFICANT EVENT
SPIRITUAL HEALTH SERVICES Significant Event  [employer] of ANOINTING  [facility] ([place]) 5b    Pt anointed by Father Emma Stephens   Pager [phone]

## 2023-07-21 NOTE — PROGRESS NOTES
Cardiology Follow up Note         Assessment and Plan:   Tessa Mansfield is a 88 year old female with PMHx of NSTEMI, CAD s/p many stents including to LAD, left circumflex, and RCA on DAPT, HFpEF (EF 55-60%), sick sinus syndrome s/p dual chamber pacemaker, atrial fibrillation AC with elequis, HTN who was admitted on 7/19 due to blood in her stools.     1. Melena + Hematochezia   2. Acute on chronic anemia   3. CAD s/p stenting to LAD, left circumflex, RCA (most recent placed on 7/17/23) on DAPT  4. Hx of STEMI  5. Sick sinus syndrome s/p dual chamber pacemaker  6.  Paroxysmal atrial fibrillation on AC, [id number]= 6, HAS-BLED= 3 (high risk)   7. HTN  8. HFpEF (55-60%)    Presented with blood in stool, hemoglobin drop from 11 to 7.2. Blood pressure stable. Per chart review patient has history of episodes of epistaxis, hemoptysis, GI bleeding with gastric ulcers, and easy bleeding in the past when on plavix + aspirin or on warfarin.  Just had new stent to RCA placed on 7/17/23 as elective procedure due to angina. Has had multiple other stents placed in the past. History of Atrial fibrillation and NSTEMI as well. Currently on apixaban 2.5 mg BID, ticagrelor 90 mg BID, aspirin 81 mg. [id number] of 6 (age, HF, female, HTN, prior MI). History of HFpEF most recent echo with EF of 55-60% with no major valvular abnormalities, PA systolic pressure of 32 mmHg. Pacemaker interrogation from 5/23/23 copied below.    Home cardiac medications: amlodipine 10 mg, aspirin 81 mg, elequis 2.5 mg BID, lasix 20 mg BID, hydralazine 100 mg BID, Imdur 20 mg, metoprolol tartrate 100 mg BID, Crestor 20 mg, ticagrelor 20 mg    . Patient last took aspirin + ticagrelor on morning of 7/18 and last took elequis on evening of 7/18. .            Recommendations:     - Continue with Plavix 75 mg daily  - Will continue to hold off on aspirin or DOAC for another day and reassess tomorrow (7/21)    Patient discussed with attending, Dr. Howard.  Please do  not hesitate to contact our service with any questions or concerns.     José Luis Dang, MS4  [facility]    Resident/Fellow Attestation   I, Jalen Barnes MD, was present with the medical/NAPOLEON student who participated in the service and in the documentation of the note.  I have verified the history and personally performed the physical exam and medical decision making.  I agree with the assessment and plan of care as documented in the note.      Jalen Barnes MD  PGY4  Date of Service (when I saw the patient): 07/21/23         Subjective/Objective:   - Colonoscopy performed this morning, 3 angiodysplasias were clipped. Patient reports feeling good and asymptomatic at this time. Was transfused 1 unit of blood last evening.         Medications:     Current Facility-Administered Medications   Medication     allopurinol (ZYLOPRIM) tablet 100 mg     budesonide (PULMICORT) neb solution 0.5 mg     clopidogrel (PLAVIX) tablet 75 mg     cyanocobalamin (VITAMIN B-12) tablet 500 mcg     fentaNYL (PF) (SUBLIMAZE) injection 25 mcg     fentaNYL (PF) (SUBLIMAZE) injection 50 mcg     fluticasone-vilanterol (BREO ELLIPTA) 100-25 MCG/ACT inhaler 1 puff     HYDROmorphone (PF) (DILAUDID) injection 0.2 mg     HYDROmorphone (PF) (DILAUDID) injection 0.4 mg     lactated ringers infusion     lidocaine (LMX4) cream     lidocaine 1 % 0.1-1 mL     melatonin tablet 1 mg     ondansetron (ZOFRAN ODT) ODT tab 4 mg    Or     ondansetron (ZOFRAN) injection 4 mg     pantoprazole (PROTONIX) IV push injection 40 mg     prochlorperazine (COMPAZINE) injection 5 mg     prochlorperazine (COMPAZINE) injection 5 mg    Or     prochlorperazine (COMPAZINE) tablet 5 mg    Or     prochlorperazine (COMPAZINE) suppository 12.5 mg     rosuvastatin (CRESTOR) tablet 20 mg     senna-docusate (SENOKOT-S/PERICOLACE) 8.6-50 MG per tablet 1 tablet    Or     senna-docusate (SENOKOT-S/PERICOLACE) 8.6-50 MG per tablet 2 tablet     sodium chloride (PF) 0.9% PF  flush 3 mL     sodium chloride (PF) 0.9% PF flush 3 mL     [Held by provider] sodium chloride 0.9% infusion            Physical Exam:   VS:  BP (!) 162/58 (BP Location: Left arm)   Pulse 72   Temp 97.4  F (36.3  C) (Oral)   Resp 16   Wt 73.9 kg (163 lb)   SpO2 92%   BMI 26.64 kg/m      Wt:   Wt Readings from Last 2 Encounters:   07/21/23 73.9 kg (163 lb)   07/13/23 73.5 kg (162 lb)      General Appearance: patient comfortable appearing, interactive           Laboratory:   BMP  Recent Labs   Lab 07/21/23  0501 07/20/23  0509 07/19/23  0952 07/17/23  0952   * 144 142 143   POTASSIUM 3.1* 3.6 3.9 4.1   CHLORIDE 114* 114* 107 107   ALEXANDRO 8.4* 8.0* 8.8 9.2   CO2 22 18* 21* 22   BUN 23.4* 40.4* 48.9* 47.1*   CR 1.49* 1.77* 2.21* 2.23*   GLC 97 77 102* 97     CBC  Recent Labs   Lab 07/21/23  0501 07/20/23 2056 07/20/23  0735 07/20/23  0509 07/19/23  1908 07/19/23  0952   WBC 8.6  --  7.2 6.8  --  8.6   RBC 2.59*  --  2.40* 2.36*  --  2.96*   HGB 7.6* 8.8* 7.2* 7.2*   < > 8.9*   HCT 25.1*  --  24.0* 23.6*  --  29.1*   MCV 97  --  100 100  --  98   MCH 29.3  --  30.0 30.5  --  30.1   MCHC 30.3*  --  30.0* 30.5*  --  30.6*   RDW 15.4*  --  14.9 14.9  --  15.1*   PLT 61*  --  69* 63*  --  89*    < > = values in this interval not displayed.     INR  Recent Labs   Lab 07/20/23  0735 07/19/23  0952 07/17/23  0952   INR 1.45* 1.42* 1.11     LFTs  Recent Labs   Lab 07/20/23  0509 07/19/23  0952   ALKPHOS  --  74   AST  --  31   ALT  --  11   BILITOTAL 0.4 0.5   PROTTOTAL  --  6.6   ALBUMIN  --  4.1      PANCNo lab results found in last 7 days.         Imaging/Procedures/Studies:   No results found. However, due to the size of the patient record, not all encounters were searched. Please check Results Review for a complete set of results.   EGD- 7/20/23  Findings:        The examined esophagus was normal.        Patchy mildly erythematous mucosa without bleeding was found in the        gastric body.        The cardia and  "gastric fundus were normal on retroflexion.        The duodenal bulb, second portion of the duodenum and third portion of        the duodenum were normal.                                                                                     Impression:                - No active bleeding or blood products visualized.                          - Normal esophagus.                          - Erythematous mucosa in the gastric body.                          - Normal duodenal bulb, second portion of the duodenum                          and third portion of the duodenum.                          - No specimens collected.   Recommendation:            - Clear liquid diet today.                          - Will discuss with patient whether colonoscopy will                          be within her goals of care.     Colonoscopy- 7/21/23  \"Pt tolerated colonoscopy with hemorrhage control, very well, under MAC. Total of 3 16mm resolution clips were placed in the cecum. Pt taken to PACU priior to returning to PCU. Report was called to pts floor nurse. Card wa placed in pts chart for her to keep regarding the metal clips in her colon.\"  Official report still pending.  "

## 2023-07-22 LAB
ANION GAP SERPL CALCULATED.3IONS-SCNC: 11 MMOL/L (ref 7–15)
BUN SERPL-MCNC: 15.3 MG/DL (ref 8–23)
BURR CELLS BLD QL SMEAR: ABNORMAL
CALCIUM SERPL-MCNC: 8.3 MG/DL (ref 8.8–10.2)
CHLORIDE SERPL-SCNC: 110 MMOL/L (ref 98–107)
CREAT SERPL-MCNC: 1.51 MG/DL (ref 0.51–0.95)
DEPRECATED HCO3 PLAS-SCNC: 22 MMOL/L (ref 22–29)
ERYTHROCYTE [DISTWIDTH] IN BLOOD BY AUTOMATED COUNT: 15.3 % (ref 10–15)
GFR SERPL CREATININE-BSD FRML MDRD: 33 ML/MIN/1.73M2
GLUCOSE SERPL-MCNC: 94 MG/DL (ref 70–99)
HCT VFR BLD AUTO: 28.7 % (ref 35–47)
HGB BLD-MCNC: 7.9 G/DL (ref 11.7–15.7)
MCH RBC QN AUTO: 29.5 PG (ref 26.5–33)
MCHC RBC AUTO-ENTMCNC: 27.5 G/DL (ref 31.5–36.5)
MCV RBC AUTO: 107 FL (ref 78–100)
PLAT MORPH BLD: ABNORMAL
PLATELET # BLD AUTO: 66 10E3/UL (ref 150–450)
POLYCHROMASIA BLD QL SMEAR: SLIGHT
POTASSIUM SERPL-SCNC: 3.8 MMOL/L (ref 3.4–5.3)
RBC # BLD AUTO: 2.68 10E6/UL (ref 3.8–5.2)
RBC MORPH BLD: ABNORMAL
SODIUM SERPL-SCNC: 143 MMOL/L (ref 136–145)
WBC # BLD AUTO: 8 10E3/UL (ref 4–11)

## 2023-07-22 PROCEDURE — 250N000009 HC RX 250

## 2023-07-22 PROCEDURE — 85027 COMPLETE CBC AUTOMATED: CPT

## 2023-07-22 PROCEDURE — 82947 ASSAY GLUCOSE BLOOD QUANT: CPT

## 2023-07-22 PROCEDURE — 99232 SBSQ HOSP IP/OBS MODERATE 35: CPT | Mod: GC | Performed by: STUDENT IN AN ORGANIZED HEALTH CARE EDUCATION/TRAINING PROGRAM

## 2023-07-22 PROCEDURE — 36415 COLL VENOUS BLD VENIPUNCTURE: CPT

## 2023-07-22 PROCEDURE — 999N000157 HC STATISTIC RCP TIME EA 10 MIN

## 2023-07-22 PROCEDURE — 250N000013 HC RX MED GY IP 250 OP 250 PS 637

## 2023-07-22 PROCEDURE — 120N000002 HC R&B MED SURG/OB UMMC

## 2023-07-22 PROCEDURE — 250N000013 HC RX MED GY IP 250 OP 250 PS 637: Performed by: STUDENT IN AN ORGANIZED HEALTH CARE EDUCATION/TRAINING PROGRAM

## 2023-07-22 RX ADMIN — ROSUVASTATIN CALCIUM 20 MG: 10 TABLET, FILM COATED ORAL at 09:11

## 2023-07-22 RX ADMIN — ALLOPURINOL 100 MG: 100 TABLET ORAL at 09:11

## 2023-07-22 RX ADMIN — PANTOPRAZOLE SODIUM 40 MG: 40 TABLET, DELAYED RELEASE ORAL at 09:11

## 2023-07-22 RX ADMIN — CYANOCOBALAMIN TAB 500 MCG 500 MCG: 500 TAB at 09:11

## 2023-07-22 RX ADMIN — BUDESONIDE 0.5 MG: 0.5 INHALANT RESPIRATORY (INHALATION) at 21:08

## 2023-07-22 RX ADMIN — APIXABAN 2.5 MG: 2.5 TABLET, FILM COATED ORAL at 17:28

## 2023-07-22 RX ADMIN — CLOPIDOGREL BISULFATE 75 MG: 75 TABLET ORAL at 09:11

## 2023-07-22 RX ADMIN — FLUTICASONE FUROATE AND VILANTEROL TRIFENATATE 1 PUFF: 100; 25 POWDER RESPIRATORY (INHALATION) at 20:34

## 2023-07-22 RX ADMIN — BUDESONIDE 0.5 MG: 0.5 INHALANT RESPIRATORY (INHALATION) at 07:47

## 2023-07-22 ASSESSMENT — ACTIVITIES OF DAILY LIVING (ADL)
ADLS_ACUITY_SCORE: 28
DEPENDENT_IADLS:: COOKING;LAUNDRY;TRANSPORTATION
ADLS_ACUITY_SCORE: 28

## 2023-07-22 NOTE — PROGRESS NOTES
[facility]    History and Physical - Medicine Service, [facility] TEAM 1       Date of Admission:  7/19/2023    Assessment & Plan      Tessa Mansfield is a 88 year old female with a past medical history significant for PMH of CAD (PCI at all 3 vessels at various times from 6153-9897) and recent RCA PCI (07/17), sick sinus syndrome s/p dual-chamber PPM, and newly diagnosed atrial fibrillation, hypertension, chronic kidney disease, hyperlipidemia, gout who presented to the emergency with  generalized weakness, bloody stools/melena, concerning for GI bleed and admitted for acute on chronic anemia 2/2 bleeding Colonic angiodysplastic lesion s/p clipping.    Changes Today:  - Tentatively plan to restart apixiban after confirmation from GI/Cards (7/22)    #Melena 2/2 bleeding Colonic angiodysplastic lesion  #Acute on Chronic Anemia due to above  #General weakness   #Coagulopathy with previous DAPT and S/P PCI stent placement  The patient's presentation including symptoms of general weakness, melena, and acute on chronic anemia --> (Hemoglobin 8.9, baseline of 10.8 to 12.4), INR of 1.42 and PTT of 40. G.I bleed likely in setting of DAPT (Aspirin, briliinta) and Apixaban for atrial fibrillation. Brilinta added s/p PCI on 07/17. Hemodynamically stable on presentation. S/p EGD on 7/20 unremarkable with no active bleeding; Colonoscopy (07/21) with bleeding colonic angiodysplastic lesion with clips placed. G.I team recommended discussion with cardiology about resumption of antiplatelets and anticoagulant but stated it would be beneficial to hold eliquis from their perspective.   -Cardiology following    >Continue Plavix 75mg daily    >Tentative plan to restart Apixaban 2.5mg BID   -Holding PTA Metoprolol, Amlodipine, Furosemide, hydralazine  -PT/OT     #Hypertension  #CKD Stage 4  Creatinine on presentation 2.21; baseline 1.8-2.2.  -CTM renal function  -Holding PTA Amlodipine,  "Furosemide, Hydralazine, Isosorbide mononitrate in setting of G.I Bleed    #Paroxysmal atrial fibrillation  #SSS s/p dual chamber PPM  [id number] = 5 (HTN, age, CAD, female)  - Holding PTA Apixaban and Metoprolol    # Mild mitral insufficiency  # Mild tricuspid insufficiency  Last TTE 7/2023 with EF 55-60%.  -OP Cards f/u 07/31    # Multivessel CAD s/p (PCI at all 3 vessels at various times from 5002-4625, known 80% ISR of LCx)  # RCA PCI (07/17)  -Holding  DAPT: Eliquis + asa   -Continue statin       Diet: Regular  DVT Prophylaxis:None  Bonner Catheter: Not present  Fluids: None  Lines: None     Cardiac Monitoring: ACTIVE order. Indication: Procedural area  Code Status: Full Code      Clinically Significant Risk Factors        # Hypokalemia: Lowest K = 3.1 mmol/L in last 2 days, will replace as needed  # Hypernatremia: Highest Na = 148 mmol/L in last 2 days, will monitor as appropriate       # Coagulation Defect: INR = 1.45 (Ref range: 0.85 - 1.15) and/or PTT = 37 Seconds (Ref range: 22 - 38 Seconds), will monitor for bleeding  # Thrombocytopenia: Lowest platelets = 56 in last 2 days, will monitor for bleeding   # Hypertension: Noted on problem list        # Overweight: Estimated body mass index is 26.64 kg/m  as calculated from the following:    Height as of 6/28/23: 1.666 m (5' 5.59\").    Weight as of this encounter: 73.9 kg (163 lb)., PRESENT ON ADMISSION          Disposition Plan     Expected Discharge Date: 07/24/2023        Discharge Comments: Progress: colonoscopy 7/21        The patient's care was discussed with the Attending Physician, Dr. Márquez.    Luciano Wakefield  Medicine Service, [facility] TEAM [address] [place] [address]  Securely message with [employer] (more info)  Text page via [facility] Paging/Directory   See signed in provider for up to date coverage information  [phone] pager________________________________________      Resident/Fellow Attestation   I, Zurdo Dockery MD, " was present with the medical/NAPOLEON student who participated in the service and in the documentation of the note.  I have verified the history and personally performed the physical exam and medical decision making.  I agree with the assessment and plan of care as documented in the note.      Changes made in medical student's note    Zurdo Dockery MD  PGY3  Date of Service (when I saw the patient): 07/22/23      Subjective/Interval history   There were no acute events overnight. Vital signs were stable over night. Hemoglobin was checked last evening and was measured at 7.2 slightly decreased from earlier in the day. The patient states she has been experiencing painless heart palpitations from time to time. She denies new headaches, chest pain, stomach pain, neck/or back pain, or pain with urination/bowels. She denies dizziness and shortness of breath. The patient has some concerns about restarting Eliquis, encouraged patient to discuss concerns with cardiology, and we will share concerns when we are able to converse with them.     Objective:  Vital signs: Temp:  [97.4  F (36.3  C)-99.1  F (37.3  C)] 97.8  F (36.6  C)  Pulse:  [59-86] 59  Resp:  [16] 16  BP: (129-162)/(39-58) 148/51  SpO2:  [92 %-100 %] 100 %     Physical Exam:    General Appearance: Well developed, well nourished, in no acute distress.   Skin: Normal skin turgor. Large areas of purple discoloration and bruising surrounding antecubital fossa.   HEENT: EOMI intact, anicteric sclera, clear conjunctiva, normal external ear anatomy, normal nose anatomy, no lesions, scars, or masses.  Neck: Supple and symmetric, no masses.  Cardiovascular: RRR, no murmurs, rubs, or gallops.  Lungs: clear to ascultation bilaterally, no rales, wheezes or rhonchi.   Abdomen: Soft, Non-tender, non-distended abdomen, no masses. Slightly decreased bowel sounds.  Musculoskeletal: Muscle tone normal.  Neurologic: Alert and oriented x 3. Normal affect. CX II-XII grossly intact. Normal  sensation.

## 2023-07-22 NOTE — PLAN OF CARE
Blood pressure (!) 150/57, pulse 78, temperature 97.9  F (36.6  C), temperature source Oral, resp. rate 18, weight 73.9 kg (163 lb), SpO2 100 %, not currently breastfeeding.    Goal Outcome Evaluation:  A&Ox4 up independently,denied pain and nausea,LS clear,BS+,had one small formed brown stool,voided adequately,large bruises on BUE and right groin area.Hgb 7.9. Awaiting for cardiology see the pt.and  give recommendation regarding Eliquis.  at bedside .Possible discharge home today .

## 2023-07-22 NOTE — PLAN OF CARE
"Cares from: 1900-0730    V/S & pain: vitally stable, denies pain  Neuro: alert, orientedx4  Respiratory: on room air  Skin: bruise on right shin and left upper arm, scab on right shin  GI/: WOF bloody stool  Nutrition: regular diet  Lines/drains: no IV access  Activity: up independent to bedside commode    Events this shift: Paged MD for latest hemoglobin level- 7.2.   waiting for 2nd hemoglobin result.  At 4:10am, lab came,stated that pt has also 6am blood extraction,  Will draw hemoglobin together with other 6am sample. Relayed to charge.   5:50 am, called lab to do blood extraction stat.  Lab staff  stated \"will send someone now\"  6:20am, called lab again for follow up on blood draws as a stat order and informed that hemoglobin had been ordered earlier on . Lab staff  stated \"will sent someone to do blood withdraw\".   call light w/in reach and able to make needs known, will continue to monitor.    Plan: for continuity of care. For potassium recheck                 "

## 2023-07-22 NOTE — CONSULTS
Care Management Initial Consult    General Information  Assessment completed with: Patient  Type of CM/SW Visit: Initial Assessment    Primary Care Provider verified and updated as needed: Yes   Readmission within the last 30 days: Previous discharge plan unsuccessful   Return Category: Exacerbation of disease.  Reason for Consult: Discharge planning  Advance Care Planning: Advance Care Planning Reviewed: No concerns identified        Communication Assessment  Patient's communication style: Spoken language (English or Bilingual)    Hearing Difficulty or Deaf: Yes   Wear Glasses or Blind: Yes    Cognitive  Cognitive/Neuro/Behavioral: WDL                      Living Environment:   People in home: spouse: Ludwin  Current living Arrangements: House (7 steps)      Able to return to prior arrangements: Yes     Family/Social Support:  Care provided by: Self  Provides care for: No one  Marital Status:    Ludwin, and children         Description of Support System: Supportive, involved    Support Assessment: Adequate family and caregiver support    Current Resources:   Patient receiving home care services: No     Community Resources:    Equipment currently used at home: Cane, walker, wheelchair, manual, grab bar, tub/shower, grab bar, toilet  Supplies currently used at home: None     Employment/Financial:  Employment Status: Retired        Financial Concerns: No concerns identified   Referral to Financial Worker: No     Does the patient's insurance plan have a 3 day qualifying hospital stay waiver?  No    Lifestyle & Psychosocial Needs:  Social Determinants of Health     Tobacco Use: Medium Risk (7/21/2023)    Patient History      Smoking Tobacco Use: Former      Smokeless Tobacco Use: Never      Passive Exposure: Not on file   Alcohol Use: Not on file   Financial Resource Strain: Not on file   Food Insecurity: Not on file   Transportation Needs: Not on file   Physical Activity: Not on file   Stress: Not on file    Social Connections: Not on file   Intimate Partner Violence: Not on file   Depression: Not at risk (4/13/2022)    PHQ-2      PHQ-2 Score: 0   Housing Stability: Not on file       Functional Status:  Prior to admission patient needed assistance:   Dependent ADLs:: Ambulation-cane, Ambulation-walker  Dependent IADLs:: Cooking, Laundry, Transportation     Mental Health Status:  Mental Health Status: No current concerns       Chemical Dependency Status:  Chemical Dependency Status: No current concerns           Values/Beliefs:  Spiritual, Cultural Beliefs, Mormon Practices, Values that affect care: No        Additional Information:  Per MD note, patient is an 88 year old female with a past medical history significant for PMH of CAD (PCI at all 3 vessels at various times from 1724-1822) and recent RCA PCI (07/17), sick sinus syndrome s/p dual-chamber PPM, and newly diagnosed atrial fibrillation, hypertension, chronic kidney disease, hyperlipidemia, gout who presented to the emergency with  generalized weakness, bloody stools/melena, concerning for GI bleed and admitted for acute on chronic anemia 2/2 bleeding Colonic angiodysplastic lesion s/p clipping.    Met with patient to introduce self/role and complete initial assessment. PCP and insurance verified. Does not receive and home care/services in the home. Denies any discharge needs at this time. Care management will continue to follow and support safe discharge plans as needed.     Madeleine Fernandes RNCC

## 2023-07-22 NOTE — PROGRESS NOTES
Cross Cover Note:     Medicine team discussed needed hemoglobin lab recheck with bedside RN X2 overnight following last value. Most recently it was reported via telephone conversation that lab came to bedside at around 0400 but lab and RN discussion independently made decision to not draw labs until 0600 without discussing with myself or overnight intern in order to group morning labs and minimize blood draws for patient. Reinforced via telephone call that this lab was critical and needed to be drawn as ordered. Order urgency changed to stat and bedside RN instructed to please call lab.     Blayne Chowdhury MD  PGY-2, Internal Medicine    None

## 2023-07-22 NOTE — PROVIDER NOTIFICATION
Name of MD:Sophie Javier, MD  Page MD: pt. latest hemoglobin level- 7.2   MD reply: waiting for reply

## 2023-07-23 ENCOUNTER — DOCUMENTATION ONLY (OUTPATIENT)
Dept: ONCOLOGY | Facility: CLINIC | Age: 88
End: 2023-07-23

## 2023-07-23 VITALS
DIASTOLIC BLOOD PRESSURE: 64 MMHG | HEART RATE: 80 BPM | TEMPERATURE: 97.9 F | RESPIRATION RATE: 16 BRPM | BODY MASS INDEX: 28.82 KG/M2 | WEIGHT: 176.37 LBS | OXYGEN SATURATION: 93 % | SYSTOLIC BLOOD PRESSURE: 171 MMHG

## 2023-07-23 LAB
ANION GAP SERPL CALCULATED.3IONS-SCNC: 10 MMOL/L (ref 7–15)
BUN SERPL-MCNC: 16.1 MG/DL (ref 8–23)
CALCIUM SERPL-MCNC: 8.2 MG/DL (ref 8.8–10.2)
CHLORIDE SERPL-SCNC: 112 MMOL/L (ref 98–107)
CREAT SERPL-MCNC: 1.57 MG/DL (ref 0.51–0.95)
DEPRECATED HCO3 PLAS-SCNC: 21 MMOL/L (ref 22–29)
ERYTHROCYTE [DISTWIDTH] IN BLOOD BY AUTOMATED COUNT: 15 % (ref 10–15)
GFR SERPL CREATININE-BSD FRML MDRD: 32 ML/MIN/1.73M2
GLUCOSE SERPL-MCNC: 87 MG/DL (ref 70–99)
HCT VFR BLD AUTO: 24.9 % (ref 35–47)
HGB BLD-MCNC: 7.1 G/DL (ref 11.7–15.7)
HGB BLD-MCNC: 7.4 G/DL (ref 11.7–15.7)
MCH RBC QN AUTO: 29.8 PG (ref 26.5–33)
MCHC RBC AUTO-ENTMCNC: 29.7 G/DL (ref 31.5–36.5)
MCV RBC AUTO: 100 FL (ref 78–100)
PLATELET # BLD AUTO: 55 10E3/UL (ref 150–450)
POTASSIUM SERPL-SCNC: 3.7 MMOL/L (ref 3.4–5.3)
RBC # BLD AUTO: 2.48 10E6/UL (ref 3.8–5.2)
SODIUM SERPL-SCNC: 143 MMOL/L (ref 136–145)
WBC # BLD AUTO: 7 10E3/UL (ref 4–11)

## 2023-07-23 PROCEDURE — 36415 COLL VENOUS BLD VENIPUNCTURE: CPT

## 2023-07-23 PROCEDURE — 250N000009 HC RX 250

## 2023-07-23 PROCEDURE — 85018 HEMOGLOBIN: CPT

## 2023-07-23 PROCEDURE — 80048 BASIC METABOLIC PNL TOTAL CA: CPT

## 2023-07-23 PROCEDURE — 99239 HOSP IP/OBS DSCHRG MGMT >30: CPT | Mod: GC | Performed by: STUDENT IN AN ORGANIZED HEALTH CARE EDUCATION/TRAINING PROGRAM

## 2023-07-23 PROCEDURE — 85027 COMPLETE CBC AUTOMATED: CPT

## 2023-07-23 PROCEDURE — 250N000013 HC RX MED GY IP 250 OP 250 PS 637: Performed by: STUDENT IN AN ORGANIZED HEALTH CARE EDUCATION/TRAINING PROGRAM

## 2023-07-23 PROCEDURE — 250N000013 HC RX MED GY IP 250 OP 250 PS 637

## 2023-07-23 PROCEDURE — 999N000157 HC STATISTIC RCP TIME EA 10 MIN

## 2023-07-23 RX ORDER — HYDRALAZINE HYDROCHLORIDE 10 MG/1
30 TABLET, FILM COATED ORAL 3 TIMES DAILY
Status: DISCONTINUED | OUTPATIENT
Start: 2023-07-23 | End: 2023-07-23 | Stop reason: HOSPADM

## 2023-07-23 RX ORDER — CLOPIDOGREL BISULFATE 75 MG/1
75 TABLET ORAL DAILY
Qty: 90 TABLET | Refills: 0 | Status: SHIPPED | OUTPATIENT
Start: 2023-07-24 | End: 2023-10-05

## 2023-07-23 RX ADMIN — CYANOCOBALAMIN TAB 500 MCG 500 MCG: 500 TAB at 09:33

## 2023-07-23 RX ADMIN — CLOPIDOGREL BISULFATE 75 MG: 75 TABLET ORAL at 09:33

## 2023-07-23 RX ADMIN — ROSUVASTATIN CALCIUM 20 MG: 10 TABLET, FILM COATED ORAL at 09:33

## 2023-07-23 RX ADMIN — ALLOPURINOL 100 MG: 100 TABLET ORAL at 09:33

## 2023-07-23 RX ADMIN — APIXABAN 2.5 MG: 2.5 TABLET, FILM COATED ORAL at 09:32

## 2023-07-23 RX ADMIN — HYDRALAZINE HYDROCHLORIDE 30 MG: 10 TABLET, FILM COATED ORAL at 09:33

## 2023-07-23 RX ADMIN — BUDESONIDE 0.5 MG: 0.5 INHALANT RESPIRATORY (INHALATION) at 09:11

## 2023-07-23 ASSESSMENT — ACTIVITIES OF DAILY LIVING (ADL)
ADLS_ACUITY_SCORE: 28

## 2023-07-23 NOTE — PLAN OF CARE
"Cares from: 1900-0730     V/S & pain: vitally stable, denies pain  Neuro: alert, orientedx4  Respiratory: on room air  Skin: bruise on right shin and left upper arm, scab on right shin  GI/: WOF bloody stool  Nutrition: regular diet  Lines/drains: no IV access  Activity: up independent to bedside commode     Events this shift: pt. Asked why all her antihypertensive medication has been removed. Informed pt. If she has question we have cross cover  now and main team will be in AM.  Pt stated \"will ask main team in AM\". Slept in between cares. Rounds done.  Pt. latest hemoglobin level 7.1. paged MD    Plan: for continuity  of care              "

## 2023-07-23 NOTE — PROGRESS NOTES
BP (!) 171/64   Pulse 80   Temp 97.9  F (36.6  C) (Oral)   Resp 16   Wt 80 kg (176 lb 5.9 oz)   SpO2 93%   BMI 28.82 kg/m      Patient discharged home following hospital stay for: blood stools    Vitals stable: yes   Oxygen status: yes- 93%  Patient tolerating diet: yes     Belongings sent with patient.  IV site discontinued  Discharge meds   AVS and education gone over with patient and    Follow ups: cardiology clinic with Kristi Bruce on 07/31 at 3pm

## 2023-07-23 NOTE — DISCHARGE SUMMARY
"[facility]  Discharge Summary - Medicine & Pediatrics       Date of Admission:  7/19/2023  Date of Discharge:  7/23/2023  Discharging Provider: Dr. Márquez   Discharge Service: Medicine Service, [facility] TEAM 1    Discharge Diagnoses     #Melena 2/2 bleeding Colonic angiodysplastic lesion  #Acute on Chronic Anemia due to above  #General weakness   #Hypertension  #CKD Stage 4  #Paroxysmal atrial fibrillation  #SSS s/p dual chamber PPM  # Multivessel CAD s/p (PCI at all 3 vessels at various times from 6407-0509, known 80% ISR of LCx)  # RCA PCI (07/17  # Mild mitral insufficiency  # Mild tricuspid insufficiency        Clinically Significant Risk Factors     # Overweight: Estimated body mass index is 28.82 kg/m  as calculated from the following:    Height as of 6/28/23: 1.666 m (5' 5.59\").    Weight as of this encounter: 80 kg (176 lb 5.9 oz).       Follow-ups Needed After Discharge   {Additional important follow-up instructions/to-do's for PCP, review Hemoglobin levels to be drawn on 7/26.     Unresulted Labs Ordered in the Past 30 Days of this Admission     No orders found from 6/19/2023 to 7/20/2023.      These results will be followed up by cardiologist and primary care physician.    Discharge Disposition   Discharged home  Condition at discharge: Stable and good     Hospital Course   Tessa Mansfield presented to the ED with generalized weakness, bloody stools/melena and was admitted for acute on chronic anemia 2/2 upper G.I Bleed. G.I bleed was likely in setting of DAPT (Aspirin, briliinta) and Apixaban for atrial fibrillation. Brilinta added s/p PCI on 07/17. S/p EGD on 7/20 unremarkable with no active bleeding; Colonoscopy (07/21) with bleeding colonic angiodysplastic lesion with clips placed. G.I team recommended discussion with cardiology about resumption of antiplatelets and anticoagulant. Per cardiology recs, resume apixaban and start plavix. Stop aspirin and " brillinta. Patient was stable on day of discharge and no repeat episode of melena.    Follow up  -Cardiology follow up on 07/31  -Repeat CBC on 07/26 (PCP and Cardiology provider CCed on result)    Med changes  -Continue Apixban 2.5mg BID  -Start Plavix 75mg daily  -Stop Aspirin and Brilinta                     Consultations This Hospital Stay   GI LUMINAL ADULT IP CONSULT  GI LUMINAL ADULT IP CONSULT  CARDIOLOGY GENERAL ADULT IP CONSULT  PHYSICAL THERAPY ADULT IP CONSULT  OCCUPATIONAL THERAPY ADULT IP CONSULT  CARE MANAGEMENT / SOCIAL WORK IP CONSULT  GI LUMINAL ADULT IP CONSULT    Code Status   Full Code     The patient was discussed with attending [facility] [address] [place] [address] [address] [address]  Phone: [phone]    Resident/Fellow Attestation   I, Zurdo Dockery MD, was present with the medical/NAPOLENO student who participated in the service and in the documentation of the note.  I have verified the history and personally performed the physical exam and medical decision making.  I agree with the assessment and plan of care as documented in the note.      Changes made in medical student's note    Zurdo Dockery MD  PGY3  Date of Service (when I saw the patient): 07/23/23    _________________________________________________________________    Physical Exam   Vital Signs: Temp: 97.9  F (36.6  C) Temp src: Oral BP: (!) 171/64 Pulse: 80   Resp: 16 SpO2: 93 % O2 Device: None (Room air)    Weight: 176 lbs 5.89 oz    General Appearance: Well developed, well nourished, in no acute distress.   Skin: Normal skin turgor. Large areas of purple discoloration and bruising surrounding antecubital fossa.   HEENT: EOMI intact, anicteric sclera, clear conjunctiva, normal external ear anatomy, normal nose anatomy, no lesions, scars, or masses.  Neck: Supple and symmetric, no masses.  Cardiovascular: RRR, no murmurs, rubs, or gallops.  Lungs: clear to ascultation  bilaterally, no rales, wheezes or rhonchi.   Abdomen: Soft, Non-tender, non-distended abdomen, no masses. Slightly decreased bowel sounds.  Musculoskeletal: Muscle tone normal.  Neurologic: Alert and oriented x 3. Normal affect. CX II-XII grossly intact. Normal sensation.     Primary Care Physician   Pedro Gomez    Discharge Orders   No discharge procedures on file.    Significant Results and Procedures   Most Recent 3 CBC's:Recent Labs   Lab Test 07/23/23  0613 07/23/23  0212 07/22/23  0643 07/21/23  2045 07/21/23  1318   WBC 7.0  --  8.0  --  8.1   HGB 7.4* 7.1* 7.9*   < > 7.4*     --  107*  --  98   PLT 55*  --  66*  --  56*    < > = values in this interval not displayed.     Most Recent 3 BMP's:Recent Labs   Lab Test 07/23/23  0613 07/22/23  0643 07/21/23  0501    143 148*   POTASSIUM 3.7 3.8 3.1*   CHLORIDE 112* 110* 114*   CO2 21* 22 22   BUN 16.1 15.3 23.4*   CR 1.57* 1.51* 1.49*   ANIONGAP 10 11 12   ALEXANDRO 8.2* 8.3* 8.4*   GLC 87 94 97     Most Recent 2 LFT's:Recent Labs   Lab Test 07/20/23  0509 07/19/23  0952 06/25/18  0942   AST  --  31 15   ALT  --  11 14   ALKPHOS  --  74 104   BILITOTAL 0.4 0.5 0.4     Most Recent 3 INR's:Recent Labs   Lab Test 07/20/23  0735 07/19/23  0952 07/17/23  0952   INR 1.45* 1.42* 1.11     Most Recent 3 Hemoglobins:Recent Labs   Lab Test 07/23/23  0613 07/23/23  0212 07/22/23  0643   HGB 7.4* 7.1* 7.9*     Most Recent Anemia Panel:Recent Labs   Lab Test 07/23/23  0613 07/20/23  2056 07/20/23  0735 07/20/23  0509 04/25/23  0850 11/21/22  1336   WBC 7.0   < > 7.2 6.8   < > 7.2   HGB 7.4*   < > 7.2* 7.2*   < > 11.0*  11.0*   HCT 24.9*   < > 24.0* 23.6*   < > 36.7      < > 100 100   < > 98   PLT 55*   < > 69* 63*   < > 125*   IRON  --   --   --   --   --  56   IRONSAT  --   --   --   --   --  15   RETICABSCT  --   --  0.091  --   --   --    RETP  --   --  3.8*  --   --   --    FEB  --   --   --   --   --  378   EMILY  --   --   --  67  --   --     < > =  values in this interval not displayed.   ,   Results for orders placed or performed during the hospital encounter of 23   Echocardiogram Complete     Value    LVEF  55-60%    Narrative    [id number]  [id number]  [id number]  [id number]^BESSIE^JORGE^MORENO     [facility],[place]  Echocardiography Laboratory  [address] [place] [address]     Name: LUCIO VALERIO  MRN: [id number]  : 1935  Study Date: 2023 09:58 AM  Age: 88 yrs  Gender: Female  Patient Location: [facility]  Reason For Study: Coronary artery disease of native artery of native heart  with st  Ordering Physician: JORGE LA  Referring Physician: JORGE LA  Performed By: Gaby Nice     BSA: 1.8 m2  Height: 66 in  Weight: 162 lb  ______________________________________________________________________________  Procedure  Echocardiogram with two-dimensional, color and spectral Doppler performed.  ______________________________________________________________________________  Interpretation Summary  Left ventricular function is normal.The ejection fraction is 55-60%. There is  mild hypokinesis of the basal and mid inferior segments.  The right ventricle is normal size. Global right ventricular function is  normal.  No significant valvular abnormalities.  The estimated PA systolic pressure is 32 mmHg.  IVC diameter <2.1 cm collapsing >50% with sniff suggests a normal RA pressure  of 3 mmHg.  This study was compared with the study from 22. No significant changes  noted, but direct visual comparison of the inferior wall motion abnormalities  is difficult due to frequent PVCs on the prior study.     ______________________________________________________________________________  Left Ventricle  Left ventricular function is normal.The ejection fraction is 55-60%. Left  ventricular wall thickness is normal. Left ventricular size is normal. Left  ventricular diastolic function is indeterminate. There is  mild hypokinesis of  the basal and mid inferior segments.     Right Ventricle  The right ventricle is normal size. Global right ventricular function is  normal. A pacemaker lead is noted in the right ventricle.     Atria  Moderate biatrial enlargement is present.     Mitral Valve  Mild mitral insufficiency is present. The mitral regurgitation is eccentric  and posteriorly directed. EROA 0.12 cm2.     Aortic Valve  The aortic valve is tricuspid. On Doppler interrogation, there is no  significant stenosis or regurgitation.     Tricuspid Valve  Mild tricuspid insufficiency is present. Right ventricular systolic pressure  is 29mmHg above the right atrial pressure.     Pulmonic Valve  Mild pulmonic insufficiency is present.     Vessels  Annulus 2.8 cm. Ascending aorta 3.1 cm. When indexed to BSA, aortic root is  normal. IVC diameter <2.1 cm collapsing >50% with sniff suggests a normal RA  pressure of 3 mmHg.     Pericardium  No pericardial effusion is present.     Compared to Previous Study  This study was compared with the study from 22 . No significant changes  noted, but direct visual comparison of the inferior wall motion abnormalities  is difficult due to frequent PVCs on the prior study.     Attestation  I have personally viewed the imaging and agree with the interpretation and  report as documented by the fellow, Karson Yip, and/or edited by me.  ______________________________________________________________________________  MMode/2D Measurements & Calculations  IVSd: 1.1 cm  LVIDd: 4.3 cm  LVIDs: 3.0 cm  LVPWd: 0.78 cm  FS: 29.6 %  LV mass(C)d: 126.3 grams  LV mass(C)dI: 69.1 grams/m2  Ao root diam: 2.8 cm  asc Aorta Diam: 3.1 cm  LVOT diam: 2.1 cm  LVOT area: 3.4 cm2  LA Volume (BP): 68.7 ml     LA Volume Index (BP): 37.5 ml/m2  RWT: 0.36     Doppler Measurements & Calculations  MV E max tawanna: 82.3 cm/sec  MV A max tawanna: 78.1 cm/sec  MV E/A: 1.1  MV max P.0 mmHg  MV mean P.7 mmHg  MV V2 VTI: 22.5  cm  MV dec time: 0.21 sec  MR PISA: 1.6 cm2  MR ERO: 0.09 cm2  MR volume: 18.9 ml  TR max seb: 267.7 cm/sec  TR max P.7 mmHg  E/E' av.6  Lateral E/e': 9.7  Medial E/e': 13.5  RV S Seb: 11.5 cm/sec     ______________________________________________________________________________  Report approved by: Zehra Perez 2023 11:45 AM           *Note: Due to a large number of results and/or encounters for the requested time period, some results have not been displayed. A complete set of results can be found in Results Review.       Discharge Medications   Current Discharge Medication List      CONTINUE these medications which have NOT CHANGED    Details   amLODIPine (NORVASC) 10 MG tablet Take 1 tablet (10 mg) by mouth daily  Qty: 90 tablet, Refills: 1    Associated Diagnoses: Essential hypertension      apixaban ANTICOAGULANT (ELIQUIS) 2.5 MG tablet Take 1 tablet (2.5 mg) by mouth 2 times daily  Qty: 180 tablet, Refills: 3    Associated Diagnoses: Renal hypertension      aspirin 81 MG EC tablet Take 1 tablet (81 mg) by mouth daily for 360 days Start tomorrow.  Qty: 90 tablet, Refills: 3    Associated Diagnoses: Coronary artery disease of native artery of native heart with stable angina pectoris (H)      budesonide (PULMICORT) 0.5 MG/2ML neb solution Take 2 mLs (0.5 mg) by nebulization 2 times daily  Qty: 2 mL, Refills: 3    Associated Diagnoses: Chronic cough      cyanocolbalamin (VITAMIN B-12) 1000 MCG tablet Take 500 mcg by mouth daily As directed      furosemide (LASIX) 20 MG tablet Take 2 tablets (40 mg) by mouth 2 times daily  Qty: 90 tablet, Refills: 1    Associated Diagnoses: Chronic kidney disease, stage IV (severe) (H); Essential hypertension      hydrALAZINE (APRESOLINE) 10 MG tablet Take 3 tablets (30 mg) by mouth 3 times daily  Qty: 270 tablet, Refills: 3    Associated Diagnoses: Renal hypertension      isosorbide mononitrate (ISMO/MONOKET) 20 MG tablet Take 2 tablets (40 mg) by mouth 3  times daily  Qty: 540 tablet, Refills: 3    Comments: Adjusting order to match what patient was previously taking.  Associated Diagnoses: Chest pain, unspecified type      metoprolol tartrate (LOPRESSOR) 100 MG tablet Take 1 tablet (100 mg) by mouth 2 times daily Patient needs to see primary provider and have labs for further refills. Please call for an appointment at [phone].  Qty: 180 tablet, Refills: 0    Comments: Patient needs to see primary provider and have labs for further refills. Please call for an appointment at [phone].  Associated Diagnoses: Essential hypertension      Multiple Vitamins-Minerals (PRESERVISION AREDS 2+MULTI VIT PO) Take by mouth 2 times daily      omeprazole (PRILOSEC) 40 MG DR capsule Take 1 capsule (40 mg) by mouth daily  Qty: 90 capsule, Refills: 3    Associated Diagnoses: Gastroesophageal reflux disease without esophagitis      potassium chloride ER (K-TAB/KLOR-CON) 10 MEQ CR tablet TAKE 2 TABLETS BY MOUTH IN THE MORNING AND 1 IN THE EVENING  Qty: 270 tablet, Refills: 3    Associated Diagnoses: Hypokalemia      rosuvastatin (CRESTOR) 20 MG tablet Take 1 tablet (20 mg) by mouth daily for 360 days  Qty: 90 tablet, Refills: 3    Associated Diagnoses: Coronary artery disease of native artery of native heart with stable angina pectoris (H)      ticagrelor (BRILINTA) 90 MG tablet Take 1 tablet (90 mg) by mouth 2 times daily for 7 days Start this evening.  Qty: 14 tablet, Refills: 0    Associated Diagnoses: Coronary artery disease of native artery of native heart with stable angina pectoris (H)      VITAMIN D3 25 MCG (1000 UT) tablet TAKE 1 TABLET BY MOUTH ONCE DAILY **DUE  TO  BE  SEEN  FOR  MORE  REFILLS**  Qty: 90 tablet, Refills: 3    Associated Diagnoses: CKD (chronic kidney disease) stage 4, GFR 15-29 ml/min (H); Secondary renal hyperparathyroidism (H)      allopurinol (ZYLOPRIM) 100 MG tablet Take 1 tablet (100 mg) by mouth daily Patient needs to see primary provider and  "have labs for further refills. Please call for an appointment at [phone].  Qty: 90 tablet, Refills: 0    Comments: Patient needs to see primary provider and have labs for further refills. Please call for an appointment at [phone].  Associated Diagnoses: Idiopathic gout, unspecified chronicity, unspecified site      fluticasone-salmeterol (ADVAIR) 250-50 MCG/ACT inhaler INHALE 1 DOSE BY MOUTH TWICE DAILY  Qty: 60 each, Refills: 8    Associated Diagnoses: Chronic cough      timolol maleate (TIMOPTIC) 0.5 % ophthalmic solution INSTILL 1 DROP INTO EACH EYE ONCE DAILY IN THE MORNING           Allergies   Allergies   Allergen Reactions     Bactrim [Sulfamethoxazole W/Trimethoprim] Other (See Comments)     Patient unable to recall     Biaxin [Clarithromycin]      Chlorthalidone Nausea and Vomiting     Clonidine      Codeine Sulfate Itching     Darvon [Propoxyphene Hcl]      Dilaudid [Hydromorphone] Visual Disturbance     Hallucinations       Gabapentin Other (See Comments) and Fatigue     \"felt drunk\"     Indomethacin      Levaquin [Levofloxacin Hemihydrate]      Morphine Sulfate      Percocet [Oxycodone-Acetaminophen] Other (See Comments)     hallucinations     Pregabalin Fatigue     Pregabalin Itching     Other reaction(s): Fatigue     Shrimp Swelling     Spironolactone Other (See Comments)     Dehydrated       Terazosin      Ciprofloxacin Rash     Simvastatin Palpitations     Muscle weakness, leg cramping       "

## 2023-07-23 NOTE — PLAN OF CARE
Goal Outcome Evaluation: Assumed cares 8633-2836. VSS on RA. Pt alert and oriented x4. Denied pain, N/V. Apixaban restarted this evening. No signs/symptoms of bleeding noted. On telemetry- SR. Recheck Hgb 2300.       Plan of Care Reviewed With: patient    Overall Patient Progress: no changeOverall Patient Progress: no change    Outcome Evaluation: Restarted apixaban this evening. Hgb recheck 2300.

## 2023-07-23 NOTE — PROVIDER NOTIFICATION
MD Name: Luz Maria Barajas MD  Page MD: Pt. latest hemoglobin level 7.1  MD reply: waiting for reply

## 2023-07-23 NOTE — PROVIDER NOTIFICATION
Paged via [facility] [place] [facility] [ Msg Id [id number] ]- Maroon 1 Resident    RM: [id number]. S. MOLIVERIO Bravo- pt's last hemoglobin was 7.1. Thanks.  -CAROL Rose #[phone]

## 2023-07-24 ENCOUNTER — PATIENT OUTREACH (OUTPATIENT)
Dept: CARE COORDINATION | Facility: CLINIC | Age: 88
End: 2023-07-24

## 2023-07-24 ENCOUNTER — ANCILLARY PROCEDURE (OUTPATIENT)
Dept: CARDIOLOGY | Facility: CLINIC | Age: 88
End: 2023-07-24
Attending: INTERNAL MEDICINE
Payer: COMMERCIAL

## 2023-07-24 DIAGNOSIS — I49.5 SINUS NODE DYSFUNCTION (H): ICD-10-CM

## 2023-07-24 LAB
MDC_IDC_EPISODE_DTM: NORMAL
MDC_IDC_EPISODE_DURATION: 66 S
MDC_IDC_EPISODE_DURATION: NORMAL S
MDC_IDC_EPISODE_ID: 4
MDC_IDC_EPISODE_ID: 5
MDC_IDC_EPISODE_ID: 6
MDC_IDC_EPISODE_TYPE: NORMAL
MDC_IDC_LEAD_IMPLANT_DT: NORMAL
MDC_IDC_LEAD_LOCATION: NORMAL
MDC_IDC_LEAD_LOCATION_DETAIL_1: NORMAL
MDC_IDC_LEAD_MFG: NORMAL
MDC_IDC_LEAD_MODEL: NORMAL
MDC_IDC_LEAD_POLARITY_TYPE: NORMAL
MDC_IDC_LEAD_SERIAL: NORMAL
MDC_IDC_LEAD_SPECIAL_FUNCTION: NORMAL
MDC_IDC_MSMT_BATTERY_DTM: NORMAL
MDC_IDC_MSMT_BATTERY_DTM: NORMAL
MDC_IDC_MSMT_BATTERY_REMAINING_LONGEVITY: 115 MO
MDC_IDC_MSMT_BATTERY_REMAINING_LONGEVITY: 118 MO
MDC_IDC_MSMT_BATTERY_RRT_TRIGGER: 2.62
MDC_IDC_MSMT_BATTERY_RRT_TRIGGER: 2.62
MDC_IDC_MSMT_BATTERY_STATUS: NORMAL
MDC_IDC_MSMT_BATTERY_STATUS: NORMAL
MDC_IDC_MSMT_BATTERY_VOLTAGE: 3.01 V
MDC_IDC_MSMT_BATTERY_VOLTAGE: 3.01 V
MDC_IDC_MSMT_LEADCHNL_RA_IMPEDANCE_VALUE: 266 OHM
MDC_IDC_MSMT_LEADCHNL_RA_IMPEDANCE_VALUE: 304 OHM
MDC_IDC_MSMT_LEADCHNL_RA_IMPEDANCE_VALUE: 323 OHM
MDC_IDC_MSMT_LEADCHNL_RA_IMPEDANCE_VALUE: 361 OHM
MDC_IDC_MSMT_LEADCHNL_RA_PACING_THRESHOLD_AMPLITUDE: 0.5 V
MDC_IDC_MSMT_LEADCHNL_RA_PACING_THRESHOLD_AMPLITUDE: 0.5 V
MDC_IDC_MSMT_LEADCHNL_RA_PACING_THRESHOLD_PULSEWIDTH: 0.4 MS
MDC_IDC_MSMT_LEADCHNL_RA_PACING_THRESHOLD_PULSEWIDTH: 0.4 MS
MDC_IDC_MSMT_LEADCHNL_RA_SENSING_INTR_AMPL: 2 MV
MDC_IDC_MSMT_LEADCHNL_RA_SENSING_INTR_AMPL: 2 MV
MDC_IDC_MSMT_LEADCHNL_RA_SENSING_INTR_AMPL: 2.12 MV
MDC_IDC_MSMT_LEADCHNL_RV_IMPEDANCE_VALUE: 342 OHM
MDC_IDC_MSMT_LEADCHNL_RV_IMPEDANCE_VALUE: 399 OHM
MDC_IDC_MSMT_LEADCHNL_RV_IMPEDANCE_VALUE: 399 OHM
MDC_IDC_MSMT_LEADCHNL_RV_IMPEDANCE_VALUE: 437 OHM
MDC_IDC_MSMT_LEADCHNL_RV_PACING_THRESHOLD_AMPLITUDE: 1 V
MDC_IDC_MSMT_LEADCHNL_RV_PACING_THRESHOLD_AMPLITUDE: 1 V
MDC_IDC_MSMT_LEADCHNL_RV_PACING_THRESHOLD_PULSEWIDTH: 0.4 MS
MDC_IDC_MSMT_LEADCHNL_RV_PACING_THRESHOLD_PULSEWIDTH: 0.4 MS
MDC_IDC_MSMT_LEADCHNL_RV_SENSING_INTR_AMPL: 8.12 MV
MDC_IDC_MSMT_LEADCHNL_RV_SENSING_INTR_AMPL: 8.12 MV
MDC_IDC_MSMT_LEADCHNL_RV_SENSING_INTR_AMPL: 9.12 MV
MDC_IDC_PG_IMPLANT_DTM: NORMAL
MDC_IDC_PG_IMPLANT_DTM: NORMAL
MDC_IDC_PG_MFG: NORMAL
MDC_IDC_PG_MFG: NORMAL
MDC_IDC_PG_MODEL: NORMAL
MDC_IDC_PG_MODEL: NORMAL
MDC_IDC_PG_SERIAL: NORMAL
MDC_IDC_PG_SERIAL: NORMAL
MDC_IDC_PG_TYPE: NORMAL
MDC_IDC_PG_TYPE: NORMAL
MDC_IDC_SESS_CLINIC_NAME: NORMAL
MDC_IDC_SESS_CLINIC_NAME: NORMAL
MDC_IDC_SESS_DTM: NORMAL
MDC_IDC_SESS_DTM: NORMAL
MDC_IDC_SESS_TYPE: NORMAL
MDC_IDC_SESS_TYPE: NORMAL
MDC_IDC_SET_BRADY_AT_MODE_SWITCH_RATE: 171 {BEATS}/MIN
MDC_IDC_SET_BRADY_AT_MODE_SWITCH_RATE: 171 {BEATS}/MIN
MDC_IDC_SET_BRADY_HYSTRATE: NORMAL
MDC_IDC_SET_BRADY_HYSTRATE: NORMAL
MDC_IDC_SET_BRADY_LOWRATE: 60 {BEATS}/MIN
MDC_IDC_SET_BRADY_LOWRATE: 60 {BEATS}/MIN
MDC_IDC_SET_BRADY_MAX_SENSOR_RATE: 130 {BEATS}/MIN
MDC_IDC_SET_BRADY_MAX_SENSOR_RATE: 130 {BEATS}/MIN
MDC_IDC_SET_BRADY_MAX_TRACKING_RATE: 130 {BEATS}/MIN
MDC_IDC_SET_BRADY_MAX_TRACKING_RATE: 130 {BEATS}/MIN
MDC_IDC_SET_BRADY_MODE: NORMAL
MDC_IDC_SET_BRADY_MODE: NORMAL
MDC_IDC_SET_BRADY_PAV_DELAY_LOW: 180 MS
MDC_IDC_SET_BRADY_PAV_DELAY_LOW: 180 MS
MDC_IDC_SET_BRADY_SAV_DELAY_LOW: 150 MS
MDC_IDC_SET_BRADY_SAV_DELAY_LOW: 150 MS
MDC_IDC_SET_LEADCHNL_RA_PACING_AMPLITUDE: 1.5 V
MDC_IDC_SET_LEADCHNL_RA_PACING_AMPLITUDE: 1.5 V
MDC_IDC_SET_LEADCHNL_RA_PACING_ANODE_ELECTRODE_1: NORMAL
MDC_IDC_SET_LEADCHNL_RA_PACING_ANODE_ELECTRODE_1: NORMAL
MDC_IDC_SET_LEADCHNL_RA_PACING_ANODE_LOCATION_1: NORMAL
MDC_IDC_SET_LEADCHNL_RA_PACING_ANODE_LOCATION_1: NORMAL
MDC_IDC_SET_LEADCHNL_RA_PACING_CAPTURE_MODE: NORMAL
MDC_IDC_SET_LEADCHNL_RA_PACING_CAPTURE_MODE: NORMAL
MDC_IDC_SET_LEADCHNL_RA_PACING_CATHODE_ELECTRODE_1: NORMAL
MDC_IDC_SET_LEADCHNL_RA_PACING_CATHODE_ELECTRODE_1: NORMAL
MDC_IDC_SET_LEADCHNL_RA_PACING_CATHODE_LOCATION_1: NORMAL
MDC_IDC_SET_LEADCHNL_RA_PACING_CATHODE_LOCATION_1: NORMAL
MDC_IDC_SET_LEADCHNL_RA_PACING_POLARITY: NORMAL
MDC_IDC_SET_LEADCHNL_RA_PACING_POLARITY: NORMAL
MDC_IDC_SET_LEADCHNL_RA_PACING_PULSEWIDTH: 0.4 MS
MDC_IDC_SET_LEADCHNL_RA_PACING_PULSEWIDTH: 0.4 MS
MDC_IDC_SET_LEADCHNL_RA_SENSING_ANODE_ELECTRODE_1: NORMAL
MDC_IDC_SET_LEADCHNL_RA_SENSING_ANODE_ELECTRODE_1: NORMAL
MDC_IDC_SET_LEADCHNL_RA_SENSING_ANODE_LOCATION_1: NORMAL
MDC_IDC_SET_LEADCHNL_RA_SENSING_ANODE_LOCATION_1: NORMAL
MDC_IDC_SET_LEADCHNL_RA_SENSING_CATHODE_ELECTRODE_1: NORMAL
MDC_IDC_SET_LEADCHNL_RA_SENSING_CATHODE_ELECTRODE_1: NORMAL
MDC_IDC_SET_LEADCHNL_RA_SENSING_CATHODE_LOCATION_1: NORMAL
MDC_IDC_SET_LEADCHNL_RA_SENSING_CATHODE_LOCATION_1: NORMAL
MDC_IDC_SET_LEADCHNL_RA_SENSING_POLARITY: NORMAL
MDC_IDC_SET_LEADCHNL_RA_SENSING_POLARITY: NORMAL
MDC_IDC_SET_LEADCHNL_RA_SENSING_SENSITIVITY: 0.3 MV
MDC_IDC_SET_LEADCHNL_RA_SENSING_SENSITIVITY: 0.3 MV
MDC_IDC_SET_LEADCHNL_RV_PACING_AMPLITUDE: 2 V
MDC_IDC_SET_LEADCHNL_RV_PACING_AMPLITUDE: 2 V
MDC_IDC_SET_LEADCHNL_RV_PACING_ANODE_ELECTRODE_1: NORMAL
MDC_IDC_SET_LEADCHNL_RV_PACING_ANODE_ELECTRODE_1: NORMAL
MDC_IDC_SET_LEADCHNL_RV_PACING_ANODE_LOCATION_1: NORMAL
MDC_IDC_SET_LEADCHNL_RV_PACING_ANODE_LOCATION_1: NORMAL
MDC_IDC_SET_LEADCHNL_RV_PACING_CAPTURE_MODE: NORMAL
MDC_IDC_SET_LEADCHNL_RV_PACING_CAPTURE_MODE: NORMAL
MDC_IDC_SET_LEADCHNL_RV_PACING_CATHODE_ELECTRODE_1: NORMAL
MDC_IDC_SET_LEADCHNL_RV_PACING_CATHODE_ELECTRODE_1: NORMAL
MDC_IDC_SET_LEADCHNL_RV_PACING_CATHODE_LOCATION_1: NORMAL
MDC_IDC_SET_LEADCHNL_RV_PACING_CATHODE_LOCATION_1: NORMAL
MDC_IDC_SET_LEADCHNL_RV_PACING_POLARITY: NORMAL
MDC_IDC_SET_LEADCHNL_RV_PACING_POLARITY: NORMAL
MDC_IDC_SET_LEADCHNL_RV_PACING_PULSEWIDTH: 0.4 MS
MDC_IDC_SET_LEADCHNL_RV_PACING_PULSEWIDTH: 0.4 MS
MDC_IDC_SET_LEADCHNL_RV_SENSING_ANODE_ELECTRODE_1: NORMAL
MDC_IDC_SET_LEADCHNL_RV_SENSING_ANODE_ELECTRODE_1: NORMAL
MDC_IDC_SET_LEADCHNL_RV_SENSING_ANODE_LOCATION_1: NORMAL
MDC_IDC_SET_LEADCHNL_RV_SENSING_ANODE_LOCATION_1: NORMAL
MDC_IDC_SET_LEADCHNL_RV_SENSING_CATHODE_ELECTRODE_1: NORMAL
MDC_IDC_SET_LEADCHNL_RV_SENSING_CATHODE_ELECTRODE_1: NORMAL
MDC_IDC_SET_LEADCHNL_RV_SENSING_CATHODE_LOCATION_1: NORMAL
MDC_IDC_SET_LEADCHNL_RV_SENSING_CATHODE_LOCATION_1: NORMAL
MDC_IDC_SET_LEADCHNL_RV_SENSING_POLARITY: NORMAL
MDC_IDC_SET_LEADCHNL_RV_SENSING_POLARITY: NORMAL
MDC_IDC_SET_LEADCHNL_RV_SENSING_SENSITIVITY: 0.9 MV
MDC_IDC_SET_LEADCHNL_RV_SENSING_SENSITIVITY: 0.9 MV
MDC_IDC_SET_ZONE_DETECTION_INTERVAL: 350 MS
MDC_IDC_SET_ZONE_DETECTION_INTERVAL: 350 MS
MDC_IDC_SET_ZONE_DETECTION_INTERVAL: 400 MS
MDC_IDC_SET_ZONE_DETECTION_INTERVAL: 400 MS
MDC_IDC_SET_ZONE_TYPE: NORMAL
MDC_IDC_STAT_AT_BURDEN_PERCENT: 0 %
MDC_IDC_STAT_AT_BURDEN_PERCENT: 3.3 %
MDC_IDC_STAT_AT_DTM_END: NORMAL
MDC_IDC_STAT_AT_DTM_END: NORMAL
MDC_IDC_STAT_AT_DTM_START: NORMAL
MDC_IDC_STAT_AT_DTM_START: NORMAL
MDC_IDC_STAT_BRADY_AP_VP_PERCENT: 0.03 %
MDC_IDC_STAT_BRADY_AP_VP_PERCENT: 0.04 %
MDC_IDC_STAT_BRADY_AP_VS_PERCENT: 54.19 %
MDC_IDC_STAT_BRADY_AP_VS_PERCENT: 72.77 %
MDC_IDC_STAT_BRADY_AS_VP_PERCENT: 0.03 %
MDC_IDC_STAT_BRADY_AS_VP_PERCENT: 0.06 %
MDC_IDC_STAT_BRADY_AS_VS_PERCENT: 27.16 %
MDC_IDC_STAT_BRADY_AS_VS_PERCENT: 45.74 %
MDC_IDC_STAT_BRADY_DTM_END: NORMAL
MDC_IDC_STAT_BRADY_DTM_END: NORMAL
MDC_IDC_STAT_BRADY_DTM_START: NORMAL
MDC_IDC_STAT_BRADY_DTM_START: NORMAL
MDC_IDC_STAT_BRADY_RA_PERCENT_PACED: 53.33 %
MDC_IDC_STAT_BRADY_RA_PERCENT_PACED: 73.06 %
MDC_IDC_STAT_BRADY_RV_PERCENT_PACED: 0.07 %
MDC_IDC_STAT_BRADY_RV_PERCENT_PACED: 0.2 %
MDC_IDC_STAT_EPISODE_RECENT_COUNT: 0
MDC_IDC_STAT_EPISODE_RECENT_COUNT: 3
MDC_IDC_STAT_EPISODE_RECENT_COUNT_DTM_END: NORMAL
MDC_IDC_STAT_EPISODE_RECENT_COUNT_DTM_START: NORMAL
MDC_IDC_STAT_EPISODE_TOTAL_COUNT: 0
MDC_IDC_STAT_EPISODE_TOTAL_COUNT: 3
MDC_IDC_STAT_EPISODE_TOTAL_COUNT: 6
MDC_IDC_STAT_EPISODE_TOTAL_COUNT_DTM_END: NORMAL
MDC_IDC_STAT_EPISODE_TOTAL_COUNT_DTM_START: NORMAL
MDC_IDC_STAT_EPISODE_TYPE: NORMAL

## 2023-07-24 PROCEDURE — 99207 CARDIAC DEVICE CHECK - REMOTE: CPT | Performed by: INTERNAL MEDICINE

## 2023-07-24 NOTE — PROGRESS NOTES
Clinic Care Coordination Contact  [facility]: Post-Discharge Note  SITUATION                                                      Admission:    Admission Date: 07/19/23   Reason for Admission: generalized weakness, bloody stools/melena  Discharge:   Discharge Date: 07/23/23  Discharge Diagnosis: Melena 2/2 bleeding Colonic angiodysplastic lesion  #Acute on Chronic Anemia due to above  #General weakness   #Hypertension  #CKD Stage 4  #Paroxysmal atrial fibrillation  #SSS s/p dual chamber PPM  # Multivessel CAD s/p (PCI at all 3 vessels at various times from 6954-3851, known 80% ISR of LCx)  # RCA PCI (07/17  # Mild mitral insufficiency  # Mild tricuspid insufficiency    BACKGROUND                                                      Per hospital discharge summary and inpatient provider notes:    Tessa Mansfield presented to the ED with generalized weakness, bloody stools/melena and was admitted for acute on chronic anemia 2/2 upper G.I Bleed     ASSESSMENT           Discharge Assessment  How are you doing now that you are home?: I'm exhausted  How are your symptoms? (Red Flag symptoms escalate to triage hotline per guidelines): Improved  Do you feel your condition is stable enough to be safe at home until your provider visit?: Yes  Does the patient have their discharge instructions? : Yes  Does the patient have questions regarding their discharge instructions? : No  Were you started on any new medications or were there changes to any of your previous medications? : Yes  Does the patient have all of their medications?: Yes  Do you have questions regarding any of your medications? : No  Do you have all of your needed medical supplies or equipment (DME)?  (i.e. oxygen tank, CPAP, cane, etc.): Yes  Discharge follow-up appointment scheduled within 14 calendar days? : Yes  Discharge Follow Up Appointment Date: 07/26/23  Discharge Follow Up Appointment Scheduled with?:  (lab appointment; 7-31 cardiology)          Post-op (Clinicians Only)  Did the patient have surgery or a procedure: Yes (EGD; Colonoscopy (07/21) with bleeding colonic angiodysplastic lesion with clips placed)  Incision:  (none)  Eating & Drinking: eating and drinking without complaints/concerns  PO Intake: regular diet  Bowel Function: normal  Date of last BM: 07/24/23 (no blood noted in stool per patient)  Urinary Status: voiding without complaint/concerns    Patient doing well but exhausted from hospital stay. No weakness or dizziness. Has walker but prefers hand touch to furniture and feels safe doing so. Taking medications as prescribed. No further questions or concerns. 24/7 [facility] nurse triage phone number provided to patient.       PLAN                                                      Outpatient Plan:  Cardiology follow up on 07/31  -Repeat CBC on 07/26 (PCP and Cardiology provider CCed on result)       Future Appointments   Date Time Provider Department Center   7/31/2023  3:00 PM Kristi Oates APRN CNP [facility]   8/9/2023  3:00 PM Pedro Gomez MD [facility]   8/28/2023 12:00 PM [facility]   10/2/2023  3:30 PM Marzena Martin MD [facility]   10/9/2023 10:30 AM UC CV DEVICE 1 UCCVCV [facility]   10/9/2023 11:00 AM [email or web address] UCCVCV [facility]   10/9/2023 12:30 PM Randa Ann NP [facility]   1/10/2024 12:00 AM UC ICD REMOTE UCCVSV [facility]   2/16/2024 12:00 PM [email or web address] CCT [facility]   2/20/2024  2:30 PM Jorge Singh MD [facility]         For any urgent concerns, please contact our 24 hour nurse triage line: [phone] ([phone])         Christa Pnedleton RN

## 2023-07-26 ENCOUNTER — LAB (OUTPATIENT)
Dept: LAB | Facility: CLINIC | Age: 88
End: 2023-07-26
Payer: COMMERCIAL

## 2023-07-26 DIAGNOSIS — Z95.5 S/P CORONARY ARTERY STENT PLACEMENT: ICD-10-CM

## 2023-07-26 LAB
BASOPHILS # BLD MANUAL: 0.2 10E3/UL (ref 0–0.2)
BASOPHILS NFR BLD MANUAL: 2 %
BURR CELLS BLD QL SMEAR: SLIGHT
DACRYOCYTES BLD QL SMEAR: SLIGHT
EOSINOPHIL # BLD MANUAL: 0.1 10E3/UL (ref 0–0.7)
EOSINOPHIL NFR BLD MANUAL: 1 %
ERYTHROCYTE [DISTWIDTH] IN BLOOD BY AUTOMATED COUNT: 15.1 % (ref 10–15)
HCT VFR BLD AUTO: 27.9 % (ref 35–47)
HGB BLD-MCNC: 8.5 G/DL (ref 11.7–15.7)
LYMPHOCYTES # BLD MANUAL: 3.6 10E3/UL (ref 0.8–5.3)
LYMPHOCYTES NFR BLD MANUAL: 39 %
MCH RBC QN AUTO: 30.7 PG (ref 26.5–33)
MCHC RBC AUTO-ENTMCNC: 30.5 G/DL (ref 31.5–36.5)
MCV RBC AUTO: 101 FL (ref 78–100)
MONOCYTES # BLD MANUAL: 0.9 10E3/UL (ref 0–1.3)
MONOCYTES NFR BLD MANUAL: 10 %
NEUTROPHILS # BLD MANUAL: 4.5 10E3/UL (ref 1.6–8.3)
NEUTROPHILS NFR BLD MANUAL: 48 %
PLAT MORPH BLD: ABNORMAL
PLATELET # BLD AUTO: 84 10E3/UL (ref 150–450)
POLYCHROMASIA BLD QL SMEAR: SLIGHT
RBC # BLD AUTO: 2.77 10E6/UL (ref 3.8–5.2)
RBC MORPH BLD: ABNORMAL
WBC # BLD AUTO: 9.3 10E3/UL (ref 4–11)

## 2023-07-26 PROCEDURE — 85007 BL SMEAR W/DIFF WBC COUNT: CPT

## 2023-07-26 PROCEDURE — 85027 COMPLETE CBC AUTOMATED: CPT

## 2023-07-26 PROCEDURE — 36415 COLL VENOUS BLD VENIPUNCTURE: CPT

## 2023-07-31 ENCOUNTER — OFFICE VISIT (OUTPATIENT)
Dept: CARDIOLOGY | Facility: CLINIC | Age: 88
End: 2023-07-31
Attending: INTERNAL MEDICINE
Payer: COMMERCIAL

## 2023-07-31 VITALS
OXYGEN SATURATION: 99 % | HEART RATE: 67 BPM | BODY MASS INDEX: 27.44 KG/M2 | DIASTOLIC BLOOD PRESSURE: 71 MMHG | SYSTOLIC BLOOD PRESSURE: 143 MMHG | WEIGHT: 167.9 LBS

## 2023-07-31 DIAGNOSIS — I25.118 CORONARY ARTERY DISEASE OF NATIVE ARTERY OF NATIVE HEART WITH STABLE ANGINA PECTORIS (H): ICD-10-CM

## 2023-07-31 DIAGNOSIS — R07.9 CHEST PAIN, UNSPECIFIED TYPE: ICD-10-CM

## 2023-07-31 DIAGNOSIS — I12.9 RENAL HYPERTENSION: ICD-10-CM

## 2023-07-31 PROCEDURE — 99214 OFFICE O/P EST MOD 30 MIN: CPT | Performed by: CASE MANAGER/CARE COORDINATOR

## 2023-07-31 PROCEDURE — G0463 HOSPITAL OUTPT CLINIC VISIT: HCPCS | Performed by: CASE MANAGER/CARE COORDINATOR

## 2023-07-31 RX ORDER — ISOSORBIDE MONONITRATE 20 MG/1
40 TABLET ORAL 3 TIMES DAILY
Qty: 540 TABLET | Refills: 3 | Status: ON HOLD | OUTPATIENT
Start: 2023-07-31 | End: 2024-05-03

## 2023-07-31 RX ORDER — HYDRALAZINE HYDROCHLORIDE 10 MG/1
30 TABLET, FILM COATED ORAL 3 TIMES DAILY
Qty: 270 TABLET | Refills: 3 | Status: SHIPPED | OUTPATIENT
Start: 2023-07-31 | End: 2023-12-21

## 2023-07-31 RX ORDER — ISOSORBIDE MONONITRATE 20 MG/1
40 TABLET ORAL 3 TIMES DAILY
Qty: 540 TABLET | Refills: 3 | Status: CANCELLED | OUTPATIENT
Start: 2023-07-31

## 2023-07-31 ASSESSMENT — PAIN SCALES - GENERAL: PAINLEVEL: NO PAIN (0)

## 2023-07-31 NOTE — PROGRESS NOTES
[facility] Cardiology - [facility] [place]   Cardiology Clinic Note      HPI:   Ms. Tessa Mansfield is a pleasant 88 year old female with medical history pertinent for CAD (PCI at all 3 vessels at various times from 1045-4623, known 80% ISR of LCx) on indefinite DAPT, sick sinus syndrome s/p dual-chamber PPM, and newly diagnosed atrial fibrillation. She presents to cardiology clinic with complaints of high blood pressure and shortness of breath.     Tessa notes that since she was seen in clinic several weeks ago, she has been experiencing shortness of breath with any level of exertion. She feels like she has to sit down for 10-15 minutes to recover. Denies PND, orthopnea, dizziness or syncope. She experiences intermittent cardiac-related pain throughout her upper back, which is the same symptom she's felt since her first heart attack many years ago. The pain usually lasts ~5 minutes and self resolves. Clarice's ankle and leg swelling have not improved since her visit to clinic several weeks ago. She wears compression stockings and keeps her feel elevated at night.    Interval History 7/31/23:  Clarice underwent PCI +ALVAREZ x1 dRCA. She unfortunately developed GI bleeding, s/p EGD on 7/20 unremarkable for active bleeding, colonoscopy on 7/21 with bleeding colonic angiodysplastic lesion with clips placed. She was discharged to home in stable condition on 7/23.     Today in clinic Tessa notes still feeling fairly fatigued. She felt more comfortable walking from the car to the office today, but yesterday she walked ~100 yards and was very tired. She lives in a split level house and sometimes feels short of breath going up/down stairs. She received a call from the device nurse that she had gone into afib overnight this week, denies palpitations. Her home weight today was 167 and -130/50s.    Summary of CAD history:  10/27/2002: AMI s/p PCI+BMS (3.5x18mm Bx velocity) to mRCA  2/5/2003: Back pain; PCI+stent to mLCX c/b distal  embolization/slow flow  4/11/2003: Unstable angina; PCI+stent (Hepacoat velocity) to mLAD  11/27/2007: PCI+DESx2 to pLAD (IVUS w/ calcification of LMCA and ulcerated plaque pLAD, 80% dRCA; also had 80% dLCX, too small to stent)  12/11/2007: Staged PCI. PCI+DESx1 (3.5x13mm Cypher) to dRCA (indefinite DAPT recommended at this time)  6/27/2008: Angina. mLCX stent 70% ISR. LAD and RCA stents patent. Myocardium at risk from LCX felt to be small, medical management preferred to PCI.  11/10/2009: Angina. Findings unchanged from 6/27/2008, FFR LAD 0.90. Medical management recommended.  7/23/2013: Unstable angina; PCI+ALVAREZ to mPDA. Diagnostic findings: LMCA 40% distal. LAD: pLAD stents patent mLAD 30% ISR dLAD 50% diffuse, D2 diffuse disease. LCX 80% mid ISR. RCA diffuse <30% mid, RPLAs diffuse disease, RPDA 100% occlusion.   5/27/2022: 90% dRCA in-stent -> PCI+DESx1 to dRCA.  50-60% pRCA (return for hemodynamic assessment if symptoms persist/recur) 50-60% small LCx (medical management recommended)  7/17/23: Shortness of breath, angina: 80% dRCA in-stent -> PCI +ALVAREZ x1 dRCA    PAST MEDICAL HISTORY:  Past Medical History:   Diagnosis Date    CAD (coronary artery disease)     CAD s/p PCI+BMS to MRCA 10/2002, PCI to mLCX 2/2003, PCI+DESx2 to pLAD 11/2007, PCI+DESx1 to dRCA 12/2007, PCI+ALVAREZ to RPDA 7/2013; on indefinite DAPT  10/27/2002    10/27/2002: AMI s/p PCI+BMS (3.5x18mm Bx velocity) to mRCA 2/5/2003: Back pain; PCI+stent to mLCX c/b distal embolization/slow flow 4/11/2003: Unstable angina; PCI+stent (Hepacoat velocity) to mLAD 11/27/2007: PCI+DESx2 to pLAD (IVUS w/ calcification of LMCA and ulcerated plaque pLAD, 80% dRCA; also had 80% dLCX, too small to stent) 12/11/2007: Staged PCI. PCI+DESx1 (3.5x13mm Cypher) to dRCA (indefinite DAPT recommended at this time) 6/27/2008: Angina. mLCX stent 70% ISR. LAD and RCA stents patent. Myocardium at risk from LCX felt to be small, medical management preferred to PCI. 11/10/2009:  Angina. Findings unchanged from 6/27/2008, FFR LAD 0.90. Medical management recommended. 7/23/2013: Unstable angina; PCI+ALVAREZ to mPDA. Diagnostic findings: LMCA 40% distal. LAD: pLAD stents patent mLAD 30% ISR dLAD 50% diffuse, D2 diffuse disease. LCX 80% mid ISR. RCA diffuse <30% mid, RPLAs diffuse disease, RPDA 100% occlusion.      Cardiac Pacemaker- Medtronic, dual chamber- NOT dependant 11/28/2007    CKD (chronic kidney disease), stage IV (H)     Hyperlipidemia LDL goal <70     Hypertension     Stented coronary artery 10-    RCA    Stented coronary artery 2-5-2003    LCx    Stented coronary artery 4-    LAD    Stented coronary artery 11-    LAD    Stented coronary artery 12-    RCA    Transient complete heart block (H) 11/28/2007       FAMILY HISTORY:  Family History   Problem Relation Age of Onset    Cancer Sister 82        bladder cancer       SOCIAL HISTORY:  Social History     Socioeconomic History    Marital status:     Number of children: 4   Occupational History     Employer: [employer]   Tobacco Use    Smoking status: Former     Packs/day: 0.30     Years: 15.00     Pack years: 4.50     Types: Cigarettes    Smokeless tobacco: Never    Tobacco comments:     Quit 22+ years ago   Substance and Sexual Activity    Drug use: No    Sexual activity: Not Currently     Partners: Male     Birth control/protection: Post-menopausal   Other Topics Concern    Blood Transfusions Yes    Exercise No       CURRENT MEDICATIONS:  allopurinol (ZYLOPRIM) 100 MG tablet, Take 1 tablet (100 mg) by mouth daily Patient needs to see primary provider and have labs for further refills. Please call for an appointment at [phone].  amLODIPine (NORVASC) 10 MG tablet, Take 1 tablet (10 mg) by mouth daily  apixaban ANTICOAGULANT (ELIQUIS) 2.5 MG tablet, Take 1 tablet (2.5 mg) by mouth 2 times daily  budesonide (PULMICORT) 0.5 MG/2ML neb solution, Take 2 mLs (0.5 mg) by nebulization 2 times  daily  clopidogrel (PLAVIX) 75 MG tablet, Take 1 tablet (75 mg) by mouth daily for 90 days  cyanocolbalamin (VITAMIN B-12) 1000 MCG tablet, Take 500 mcg by mouth daily As directed  fluticasone-salmeterol (ADVAIR) 250-50 MCG/ACT inhaler, INHALE 1 DOSE BY MOUTH TWICE DAILY  furosemide (LASIX) 20 MG tablet, Take 2 tablets (40 mg) by mouth 2 times daily  metoprolol tartrate (LOPRESSOR) 100 MG tablet, Take 1 tablet (100 mg) by mouth 2 times daily Patient needs to see primary provider and have labs for further refills. Please call for an appointment at [phone].  Multiple Vitamins-Minerals (PRESERVISION AREDS 2+MULTI VIT PO), Take by mouth 2 times daily  omeprazole (PRILOSEC) 40 MG DR capsule, Take 1 capsule (40 mg) by mouth daily  potassium chloride ER (K-TAB/KLOR-CON) 10 MEQ CR tablet, TAKE 2 TABLETS BY MOUTH IN THE MORNING AND 1 IN THE EVENING  rosuvastatin (CRESTOR) 20 MG tablet, Take 1 tablet (20 mg) by mouth daily for 360 days  timolol maleate (TIMOPTIC) 0.5 % ophthalmic solution, INSTILL 1 DROP INTO EACH EYE ONCE DAILY IN THE MORNING  VITAMIN D3 25 MCG (1000 UT) tablet, TAKE 1 TABLET BY MOUTH ONCE DAILY **DUE  TO  BE  SEEN  FOR  MORE  REFILLS**    No current facility-administered medications on file prior to visit.      ROS:   Refer to HPI    EXAM:  BP (!) 143/71 (BP Location: Right arm, Patient Position: Chair, Cuff Size: Adult Regular)   Pulse 67   Wt 76.2 kg (167 lb 14.4 oz)   SpO2 99%   BMI 27.44 kg/m    GENERAL: Appears comfortable, in no acute distress.   HEENT: Eye symmetrical, no discharge or icterus bilaterally. Mucous membranes moist and without lesions.  CV: RRR, +S1S2, no murmur, rub, or gallop.   RESPIRATORY: Respirations regular, even, and unlabored. Lungs CTA throughout.   GI: Soft and non distended with normoactive bowel sounds present in all quadrants. No tenderness, rebound, guarding.   EXTREMITIES: 3+ peripheral edema. 2+ bilateral pedal pulses.   NEUROLOGIC: Alert and oriented x 3. No  focal deficits.   MUSCULOSKELETAL: No joint swelling or tenderness.   SKIN: No jaundice. No rashes or lesions.     Labs, reviewed with patient in clinic today:  CBC RESULTS:  Lab Results   Component Value Date    WBC 9.3 07/26/2023    WBC 5.4 08/26/2020    RBC 2.77 (L) 07/26/2023    RBC 3.58 (L) 08/26/2020    HGB 8.5 (L) 07/26/2023    HGB 11.2 (L) 08/26/2020    HCT 27.9 (L) 07/26/2023    HCT 36.0 08/26/2020     (H) 07/26/2023     (H) 08/26/2020    MCH 30.7 07/26/2023    MCH 31.3 08/26/2020    MCHC 30.5 (L) 07/26/2023    MCHC 31.1 (L) 08/26/2020    RDW 15.1 (H) 07/26/2023    RDW 14.9 08/26/2020    PLT 84 (L) 07/26/2023     (L) 08/26/2020       CMP RESULTS:  Lab Results   Component Value Date     07/23/2023     08/26/2020    POTASSIUM 3.7 07/23/2023    POTASSIUM 4.8 11/21/2022    POTASSIUM 4.4 08/26/2020    CHLORIDE 112 (H) 07/23/2023    CHLORIDE 110 (H) 11/21/2022    CHLORIDE 111 (H) 08/26/2020    CO2 21 (L) 07/23/2023    CO2 24 11/21/2022    CO2 24 08/26/2020    ANIONGAP 10 07/23/2023    ANIONGAP 6 11/21/2022    ANIONGAP 8 08/26/2020    GLC 87 07/23/2023    GLC 79 11/21/2022    GLC 84 08/26/2020    BUN 16.1 07/23/2023    BUN 35 (H) 11/21/2022    BUN 41 (H) 08/26/2020    CR 1.57 (H) 07/23/2023    CR 1.70 (H) 08/26/2020    GFRESTIMATED 32 (L) 07/23/2023    GFRESTIMATED 27 (L) 08/26/2020    GFRESTBLACK 32 (L) 08/26/2020    ALEXANDRO 8.2 (L) 07/23/2023    ALEXANDRO 9.0 08/26/2020    BILITOTAL 0.4 07/20/2023    BILITOTAL 0.4 06/25/2018    ALBUMIN 4.1 07/19/2023    ALBUMIN 3.8 11/21/2022    ALBUMIN 3.6 08/26/2020    ALKPHOS 74 07/19/2023    ALKPHOS 104 06/25/2018    ALT 11 07/19/2023    ALT 14 06/25/2018    AST 31 07/19/2023    AST 15 06/25/2018        INR RESULTS:  Lab Results   Component Value Date    INR 1.45 (H) 07/20/2023    INR 1.08 12/27/2013       Lab Results   Component Value Date    MAG 2.1 08/08/2022    MAG 2.4 (H) 01/06/2020     No results found for: NTBNPI  Lab Results   Component Value  Date    NTBNP 1,423 06/28/2023       EKG 6/15/23: A-paced HR 63        Echocardiogram 7/17/23:    Procedure  Echocardiogram with two-dimensional, color and spectral Doppler performed.  ______________________________________________________________________________  Interpretation Summary  Left ventricular function is normal.The ejection fraction is 55-60%. There is  mild hypokinesis of the basal and mid inferior segments.  The right ventricle is normal size. Global right ventricular function is  normal.  No significant valvular abnormalities.  The estimated PA systolic pressure is 32 mmHg.  IVC diameter <2.1 cm collapsing >50% with sniff suggests a normal RA pressure  of 3 mmHg.  This study was compared with the study from 5/17/22. No significant changes  noted, but direct visual comparison of the inferior wall motion abnormalities  is difficult due to frequent PVCs on the prior study.     ______________________________________________________________________________  Left Ventricle  Left ventricular function is normal.The ejection fraction is 55-60%. Left  ventricular wall thickness is normal. Left ventricular size is normal. Left  ventricular diastolic function is indeterminate. There is mild hypokinesis of  the basal and mid inferior segments.     Right Ventricle  The right ventricle is normal size. Global right ventricular function is  normal. A pacemaker lead is noted in the right ventricle.     Atria  Moderate biatrial enlargement is present.     Mitral Valve  Mild mitral insufficiency is present. The mitral regurgitation is eccentric  and posteriorly directed. EROA 0.12 cm2.     Aortic Valve  The aortic valve is tricuspid. On Doppler interrogation, there is no  significant stenosis or regurgitation.     Tricuspid Valve  Mild tricuspid insufficiency is present. Right ventricular systolic pressure  is 29mmHg above the right atrial pressure.     Pulmonic Valve  Mild pulmonic insufficiency is present.      Vessels  Annulus 2.8 cm. Ascending aorta 3.1 cm. When indexed to BSA, aortic root is  normal. IVC diameter <2.1 cm collapsing >50% with sniff suggests a normal RA  pressure of 3 mmHg.     Pericardium  No pericardial effusion is present.     Compared to Previous Study  This study was compared with the study from 22 . No significant changes  noted, but direct visual comparison of the inferior wall motion abnormalities  is difficult due to frequent PVCs on the prior study.     Attestation  I have personally viewed the imaging and agree with the interpretation and  report as documented by the fellow, Karson Yip, and/or edited by me.  ______________________________________________________________________________  MMode/2D Measurements & Calculations  IVSd: 1.1 cm  LVIDd: 4.3 cm  LVIDs: 3.0 cm  LVPWd: 0.78 cm  FS: 29.6 %  LV mass(C)d: 126.3 grams  LV mass(C)dI: 69.1 grams/m2  Ao root diam: 2.8 cm  asc Aorta Diam: 3.1 cm  LVOT diam: 2.1 cm  LVOT area: 3.4 cm2  LA Volume (BP): 68.7 ml     LA Volume Index (BP): 37.5 ml/m2  RWT: 0.36     Doppler Measurements & Calculations  MV E max tawanna: 82.3 cm/sec  MV A max tawanna: 78.1 cm/sec  MV E/A: 1.1  MV max P.0 mmHg  MV mean P.7 mmHg  MV V2 VTI: 22.5 cm  MV dec time: 0.21 sec  MR PISA: 1.6 cm2  MR ERO: 0.09 cm2  MR volume: 18.9 ml  TR max tawanna: 267.7 cm/sec  TR max P.7 mmHg  E/E' av.6  Lateral E/e': 9.7  Medial E/e': 13.5  RV S Tawanna: 11.5 cm/sec     ______________________________________________________________________________  Report approved by: Zehra Preez 2023 11:45 AM    Coronary Angiogram 23:    Indications    Coronary artery disease of native artery of native heart with stable angina pectoris (H) [I25.118 (ICD-10-CM)]   Shortness of breath [R06.02 (ICD-10-CM)]     Comments/Patient Narrative    87-year-old female with an extensive cardiac history (see below), CKD III, SSS s/p DC PPM with 84% pacing rate who presents for C CORS with  complaints of HIGGINS.    Summary of CAD history:   1. 10/27/2002: AMI s/p PCI+BMS (3.5x18mm Bx velocity) to mRCA   2. 2/5/2003: Back pain; PCI+stent to mLCX c/b distal embolization/slow flow   3. 4/11/2003: Unstable angina; PCI+stent (Hepacoat velocity) to mLAD   4. 11/27/2007: PCI+DESx2 to pLAD (IVUS w/ calcification of LMCA and ulcerated plaque pLAD, 80% dRCA; also had 80% dLCX, too small to stent)   5. 12/11/2007: Staged PCI. PCI+DESx1 (3.5x13mm Cypher) to dRCA (indefinite DAPT recommended at this time)   6. 6/27/2008: Angina. mLCX stent 70% ISR. LAD and RCA stents patent. Myocardium at risk from LCX felt to be small, medical management preferred to PCI.   7. 11/10/2009: Angina. Findings unchanged from 6/27/2008, FFR LAD 0.90. Medical management recommended.   8. 7/23/2013: Unstable angina; PCI+ALVAREZ to mPDA. Diagnostic findings: LMCA 40% distal. LAD: pLAD stents patent mLAD 30% ISR dLAD 50% diffuse, D2 diffuse disease. LCX 80% mid ISR. RCA diffuse <30% mid, RPLAs diffuse disease, RPDA 100% occlusion.     Pre Procedure Diagnosis    ACS > 24 hoursstable known CADother    Post Procedure Diagnosis    Multivessel CAD      Conclusion    Right sided filling pressures are moderately elevated.  Mild pulmonary hypertension.  Pulmonary capillary wedge pressures are normal.  Normal cardiac output.  90% distal RCA in stent restenotic lesion s/p successful PCI using a 2.75*32 mm Synergy ALVAREZ post dilated using a 3.0mm NC.  50-60% proximal RCA instent restenotic lesion. Can plan hemodynamic assessment of this lesion if persistently symptomatic.  50-60% ISR of the mid LCX stent - the vessel is small in this segment. Conservative management of this is recommended.         Plan     Follow bedrest per protocol   Continued medical management and lifestyle modifications for cardiovascular risk factor optimizations.   Follow up with Dr. Santoyo.   Arterial sheath removed from femoral artery with closure device.   Femoral angiogram  identifies arterial sheath placement suitable for closure device.   Cardiac rehabilitation.   Discharge today per protocol      1. Continue ASA 81mg and Plavix 75mg  2. Continue guideline directed medical management for secondary prevention of CAD     Coronary Findings    Diagnostic  Dominance: Right  Left Anterior Descending   The vessel is small.   Ost LAD to Prox LAD lesion is 50% stenosed. The lesion was previously treated using a stent of unknown type.   Prox LAD to Mid LAD lesion is 40% stenosed. The lesion was previously treated using a stent of unknown type.      First Diagonal Branch   The vessel is small.   1st Diag lesion is 85% stenosed.      Second Diagonal Branch   2nd Diag lesion is 70% stenosed.      Left Circumflex   The vessel is small.   Prox Cx to Mid Cx lesion is 55% stenosed. The lesion was previously treated using a stent of unknown type.      First Obtuse Marginal Branch   The vessel is large.      Second Obtuse Marginal Branch   The vessel is small.      Right Coronary Artery   The vessel is moderate in size.   Mid RCA to Dist RCA lesion is 90% stenosed. The lesion was previously treated using a stent of unknown type.   Dist RCA lesion is 30% stenosed. The lesion was previously treated using a stent of unknown type.      Right Posterior Descending Artery   There is mild diffuse disease throughout the vessel.   RPDA-1 lesion is 65% stenosed.   RPDA-2 lesion is 50% stenosed. The lesion was previously treated using a stent of unknown type.      Right Posterior Atrioventricular Artery   There is mild diffuse disease throughout the vessel.         Intervention     Mid RCA to Dist RCA lesion   Stent (Also treats lesions: Dist RCA)   Lesion length: 28 mm. A CATH GUIDING BLUE YELLOW PTFE [id number] guide catheter was successfully placed. The GUIDEWIRE VASC [id number] RUNTHROUGH  crossed the lesion. The pre-interventional distal flow is normal (BREANNA 3). A STENT CORONARY ALVAREZ  SYNERGY XD MR US 2.14E22BO [id number] drug eluting stent was successfully placed. Pre-stent angioplasty was performed using a CATH BALLOON EMERGE [id number] supply. . Post-stent angioplasty was performed using a CATH BALLOON NC EMERGE [id number] supply. . The post-interventional distal flow is normal (BREANNA 3). The intervention was successful. No complications occurred at this lesion.   There is a 0% residual stenosis post intervention.      Dist RCA lesion   Stent (Also treats lesions: Mid RCA to Dist RCA)   See details in Mid RCA to Dist RCA lesion.   There is a 0% residual stenosis post intervention.           Hemodynamics    RA 13  RV 46/13  PA 46/16 (26)  PCWP 15    Jamila CO 4.76 CI 2.6  PVR by Jamila CO 2.3 Right sided filling pressures are moderately elevated. Left sided filling pressures are normal. Mild elevated pulmonary hypertension. Normal cardiac output level. Hemodynamic data has been modified in Epic per physician review.     Pressures Phase: Baseline     Time Systolic (mmHg) Diastolic (mmHg) Mean (mmHg) A Wave (mmHg) V Wave (mmHg) EDP (mmHg) Max dp/dt (mmHg/sec) HR (bpm) Content (mL/dL) SAT (%)   RA Pressures  3:36 PM   10    13    14      53        RV Pressures  3:16 PM       291           3:36 PM 45    2       15     50        PA Pressures  3:38 PM 44    14    26        53        PCW Pressures  3:38 PM   16    15    20      53        AO Pressures  4:03     61    87        61          Blood Flow Results Phase: Baseline     Time Results  Indexed Values (L/min/m2)   QP  3:16 PM 4.76 L/min    2.59      QS  3:16 PM 4.76 L/min    2.59        Blood Oximetry Phase: Baseline     Time Hb  SAT(%)  PO2  Content (mL/dL) PA Sat (%)   PA  3:16 PM  63 %      63      Art  3:16 PM  96 %     13.58         Cardiac Output Phase: Baseline     Time TDCO (L/min) TDCI (L/min/m2) Jamila C.O. (L/min) Jamila C.I. (L/min/m2) Jamila HR (bpm)   Cardiac Output Results  3:16 PM   4.76    2.59          Resistance Results Phase: Baseline     Time PVR  SVR  TPR  TVR  PVR/SVR  TPR/TVR    Resistance Results (Metric)  3:16 .16 dsc-5    1294.81 dsc-5    437.21 dsc-5    1462.96 dsc-5    0.13    0.3      Resistance Results (Wood)  3:16 PM 2.1 HIDALGO    16.19 HIDALGO    5.47 HIDALGO    18.29 HIDALGO    0.13    0.3        Stoke Volume Results Phase: Baseline     Time RVSW (gm*m) LVSW (gm*m) RVSW-I (gm*m/m2) LVSW-I (gm*m/m2)   Stroke Work Results  3:16 PM 19.53    75.29    10.63    40.98          Cardiac Device Interrogation 7/24/23:  Yellow Alert received for AT/AF Daily Acworth > Threshold, Fast V Rate During AT/AF, 11 hours in AT/AF Since Last Session     Remote pacemaker transmission received and reviewed. Device transmission sent per MD orders.     Device: eigital [id number] Dupo XT DR MRI  Normal Device Function  Mode: AAIR<=>DDDR  bpm  AP: 53.3%  : 0.2%  Presenting EGM: AF with vent rate ~ 60-70's bpm  Short V-V intervals: 0  Lead Trends Appear Stable: Yes  Estimated battery longevity to RRT = 9.6 years  Atrial Arrhythmia: 3 AT/AF episodes recorded. Patient has been in AF since 7/23/23 @ 1309.  AF Acworth: 3.3%  Anticoagulant: Eliquis  Ventricular Arrhythmia: 0  Pt Notified of Transmission Results: Yes, via telephone. Patient reports that she is feeling well and denies symptoms. Requested that patient send another remote pacemaker transmission today. Transmission reviewed. Patient is no longer in AF. The episode lasted 11 hours 30 minutes in duration.    Assessment and Plan:   Ms. Mansfield is a 88 year old female with a PMH of CAD (PCI at all 3 vessels at various times from 6545-9530, known 80% ISR of LCx) on indefinite DAPT, sick sinus syndrome s/p dual-chamber PPM, and newly diagnosed atrial fibrillation.    # Multivessel CAD s/p (PCI at all 3 vessels at various times from 3057-9430, known 80% ISR of LCx)  # Shortness of breath  See above for full cardiac cath history. Underwent angiogram that showed in-stent lesion in  dRCA,, underwent DESx1 dRCA. Course complicated by GI bleeding requiring colonic clips.   - DAPT: Eliquis + Plavix 75mg daily  - Continue statin    # Moderate mitral regurgitation  # Moderate-to-severe tricuspid regurgitation  Last TTE 7/2023 with EF 55-60%  - Most recent echo stable with no significant changes to prior studies,.  - Continue Lasix, Hydral, and isosorbide mononitrate for afterload reduction    # Renal hypertension  # CKD stage 4  Creatinine downtrending from SHAJI of 2 during hospitalization. Pt notes home -130/50s. BP elevated in clinic today.   - Amlodipine 10mg daily  - Furosemide 40mg BID  - Hydralazine dose increased to 30mg TID  - Isosorbide mononitrate 40mg TID  - Lopressor 100mg BID  - Followed by Odilia Martin and Scar    # Paroxysmal atrial fibrillation  # SSS s/p dual chamber PPM  [id number] = 5 (HTN, age, CAD, female). Recent device interrogation showed recent AF episode of [id number], with ventricular rate ~60-70s.   - Eliquis 2.5mg BID  - Rate control: Lopressor 100mg BID  - Routine clinic f/u with device interrogation    # Acute on chronic anemia   # Melena 2/2 bleeding colonic angiodysplastic lesion  Admited inpatient for melena after stent, EGD unremarkable with no active bleeding, colonoscopy with bleeding colonic angiodysplastic lesion with clips placed.   - Hgb this week 8.5 (up from 7.4)  - Denies any melena or hematuria    Follow up: 6 months with gen cardiology, with Randa Ann in Structural clinic in October    Chart review time today: 8 minutes  Visit time today: 22 minutes  Total time spent today: 30 minutes        JI PEREIRA CNP  General Cardiology   07/31/23

## 2023-07-31 NOTE — NURSING NOTE
Chief Complaint   Patient presents with    Follow Up     Return Cardiology- follow up GI bleed post angioplasty     Vitals were taken and medications reconciled.    NEELIMA Flores  2:48 PM

## 2023-07-31 NOTE — PATIENT INSTRUCTIONS
You were seen today in the Cardiovascular Clinic at the [facility] by:       JI DE LEON CNP    Your visit summary and instructions are as follows:    No medication changes    Return to cardiology clinic in 6 months     Thank you for your visit today!     Please MyChart message me or call my nurse if you have any questions or concerns.      During Business Hours:  [phone], option # 1 (University) then option # 4 (medical questions) and ask to speak with my nurse.     After hours, weekends or holidays:   [phone], Option #4  Ask to speak to the On-Call Cardiologist. Inform them you are a cardiology patient at the [facility].

## 2023-07-31 NOTE — LETTER
7/31/2023      RE: Tessa Mansfield  [address]  [facility] [address]       Dear Colleague,    Thank you for the opportunity to participate in the care of your patient, Tessa Mansfield, at the [facility]ART [facility] [place] [facility]. Please see a copy of my visit note below.      [facility] Cardiology - [facility] [place]   Cardiology Clinic Note      HPI:   Ms. Tessa Mansfield is a pleasant 88 year old female with medical history pertinent for CAD (PCI at all 3 vessels at various times from 3175-8562, known 80% ISR of LCx) on indefinite DAPT, sick sinus syndrome s/p dual-chamber PPM, and newly diagnosed atrial fibrillation. She presents to cardiology clinic with complaints of high blood pressure and shortness of breath.     Tessa notes that since she was seen in clinic several weeks ago, she has been experiencing shortness of breath with any level of exertion. She feels like she has to sit down for 10-15 minutes to recover. Denies PND, orthopnea, dizziness or syncope. She experiences intermittent cardiac-related pain throughout her upper back, which is the same symptom she's felt since her first heart attack many years ago. The pain usually lasts ~5 minutes and self resolves. Clarice's ankle and leg swelling have not improved since her visit to clinic several weeks ago. She wears compression stockings and keeps her feel elevated at night.    Interval History 7/31/23:  Clarice underwent PCI +ALVAREZ x1 dRCA. She unfortunately developed GI bleeding, s/p EGD on 7/20 unremarkable for active bleeding, colonoscopy on 7/21 with bleeding colonic angiodysplastic lesion with clips placed. She was discharged to home in stable condition on 7/23.     Today in clinic Tessa notes still feeling fairly fatigued. She felt more comfortable walking from the car to the office today, but yesterday she walked ~100 yards and was very tired. She lives in a split level house and sometimes feels  short of breath going up/down stairs. She received a call from the device nurse that she had gone into afib overnight this week, denies palpitations. Her home weight today was 167 and -130/50s.    Summary of CAD history:  10/27/2002: AMI s/p PCI+BMS (3.5x18mm Bx velocity) to mRCA  2/5/2003: Back pain; PCI+stent to mLCX c/b distal embolization/slow flow  4/11/2003: Unstable angina; PCI+stent (Hepacoat velocity) to mLAD  11/27/2007: PCI+DESx2 to pLAD (IVUS w/ calcification of LMCA and ulcerated plaque pLAD, 80% dRCA; also had 80% dLCX, too small to stent)  12/11/2007: Staged PCI. PCI+DESx1 (3.5x13mm Cypher) to dRCA (indefinite DAPT recommended at this time)  6/27/2008: Angina. mLCX stent 70% ISR. LAD and RCA stents patent. Myocardium at risk from LCX felt to be small, medical management preferred to PCI.  11/10/2009: Angina. Findings unchanged from 6/27/2008, FFR LAD 0.90. Medical management recommended.  7/23/2013: Unstable angina; PCI+ALVAREZ to mPDA. Diagnostic findings: LMCA 40% distal. LAD: pLAD stents patent mLAD 30% ISR dLAD 50% diffuse, D2 diffuse disease. LCX 80% mid ISR. RCA diffuse <30% mid, RPLAs diffuse disease, RPDA 100% occlusion.   5/27/2022: 90% dRCA in-stent -> PCI+DESx1 to dRCA.  50-60% pRCA (return for hemodynamic assessment if symptoms persist/recur) 50-60% small LCx (medical management recommended)  7/17/23: Shortness of breath, angina: 80% dRCA in-stent -> PCI +ALVAREZ x1 dRCA    PAST MEDICAL HISTORY:  Past Medical History:   Diagnosis Date     CAD (coronary artery disease)      CAD s/p PCI+BMS to MRCA 10/2002, PCI to mLCX 2/2003, PCI+DESx2 to pLAD 11/2007, PCI+DESx1 to dRCA 12/2007, PCI+ALVAREZ to RPDA 7/2013; on indefinite DAPT  10/27/2002    10/27/2002: AMI s/p PCI+BMS (3.5x18mm Bx velocity) to mRCA 2/5/2003: Back pain; PCI+stent to mLCX c/b distal embolization/slow flow 4/11/2003: Unstable angina; PCI+stent (Hepacoat velocity) to mLAD 11/27/2007: PCI+DESx2 to pLAD (IVUS w/ calcification of LMCA  and ulcerated plaque pLAD, 80% dRCA; also had 80% dLCX, too small to stent) 12/11/2007: Staged PCI. PCI+DESx1 (3.5x13mm Cypher) to dRCA (indefinite DAPT recommended at this time) 6/27/2008: Angina. mLCX stent 70% ISR. LAD and RCA stents patent. Myocardium at risk from LCX felt to be small, medical management preferred to PCI. 11/10/2009: Angina. Findings unchanged from 6/27/2008, FFR LAD 0.90. Medical management recommended. 7/23/2013: Unstable angina; PCI+ALVAREZ to mPDA. Diagnostic findings: LMCA 40% distal. LAD: pLAD stents patent mLAD 30% ISR dLAD 50% diffuse, D2 diffuse disease. LCX 80% mid ISR. RCA diffuse <30% mid, RPLAs diffuse disease, RPDA 100% occlusion.       Cardiac Pacemaker- Medtronic, dual chamber- NOT dependant 11/28/2007     CKD (chronic kidney disease), stage IV (H)      Hyperlipidemia LDL goal <70      Hypertension      Stented coronary artery 10-    RCA     Stented coronary artery 2-5-2003    LCx     Stented coronary artery 4-    LAD     Stented coronary artery 11-    LAD     Stented coronary artery 12-    RCA     Transient complete heart block (H) 11/28/2007       FAMILY HISTORY:  Family History   Problem Relation Age of Onset     Cancer Sister 82        bladder cancer       SOCIAL HISTORY:  Social History     Socioeconomic History     Marital status:      Number of children: 4   Occupational History     Employer: [employer]   Tobacco Use     Smoking status: Former     Packs/day: 0.30     Years: 15.00     Pack years: 4.50     Types: Cigarettes     Smokeless tobacco: Never     Tobacco comments:     Quit 22+ years ago   Substance and Sexual Activity     Drug use: No     Sexual activity: Not Currently     Partners: Male     Birth control/protection: Post-menopausal   Other Topics Concern     Blood Transfusions Yes     Exercise No       CURRENT MEDICATIONS:  allopurinol (ZYLOPRIM) 100 MG tablet, Take 1 tablet (100 mg) by mouth daily Patient needs to see  primary provider and have labs for further refills. Please call for an appointment at [phone].  amLODIPine (NORVASC) 10 MG tablet, Take 1 tablet (10 mg) by mouth daily  apixaban ANTICOAGULANT (ELIQUIS) 2.5 MG tablet, Take 1 tablet (2.5 mg) by mouth 2 times daily  budesonide (PULMICORT) 0.5 MG/2ML neb solution, Take 2 mLs (0.5 mg) by nebulization 2 times daily  clopidogrel (PLAVIX) 75 MG tablet, Take 1 tablet (75 mg) by mouth daily for 90 days  cyanocolbalamin (VITAMIN B-12) 1000 MCG tablet, Take 500 mcg by mouth daily As directed  fluticasone-salmeterol (ADVAIR) 250-50 MCG/ACT inhaler, INHALE 1 DOSE BY MOUTH TWICE DAILY  furosemide (LASIX) 20 MG tablet, Take 2 tablets (40 mg) by mouth 2 times daily  metoprolol tartrate (LOPRESSOR) 100 MG tablet, Take 1 tablet (100 mg) by mouth 2 times daily Patient needs to see primary provider and have labs for further refills. Please call for an appointment at [phone].  Multiple Vitamins-Minerals (PRESERVISION AREDS 2+MULTI VIT PO), Take by mouth 2 times daily  omeprazole (PRILOSEC) 40 MG DR capsule, Take 1 capsule (40 mg) by mouth daily  potassium chloride ER (K-TAB/KLOR-CON) 10 MEQ CR tablet, TAKE 2 TABLETS BY MOUTH IN THE MORNING AND 1 IN THE EVENING  rosuvastatin (CRESTOR) 20 MG tablet, Take 1 tablet (20 mg) by mouth daily for 360 days  timolol maleate (TIMOPTIC) 0.5 % ophthalmic solution, INSTILL 1 DROP INTO EACH EYE ONCE DAILY IN THE MORNING  VITAMIN D3 25 MCG (1000 UT) tablet, TAKE 1 TABLET BY MOUTH ONCE DAILY **DUE  TO  BE  SEEN  FOR  MORE  REFILLS**    No current facility-administered medications on file prior to visit.      ROS:   Refer to HPI    EXAM:  BP (!) 143/71 (BP Location: Right arm, Patient Position: Chair, Cuff Size: Adult Regular)   Pulse 67   Wt 76.2 kg (167 lb 14.4 oz)   SpO2 99%   BMI 27.44 kg/m    GENERAL: Appears comfortable, in no acute distress.   HEENT: Eye symmetrical, no discharge or icterus bilaterally. Mucous membranes moist and  without lesions.  CV: RRR, +S1S2, no murmur, rub, or gallop.   RESPIRATORY: Respirations regular, even, and unlabored. Lungs CTA throughout.   GI: Soft and non distended with normoactive bowel sounds present in all quadrants. No tenderness, rebound, guarding.   EXTREMITIES: 3+ peripheral edema. 2+ bilateral pedal pulses.   NEUROLOGIC: Alert and oriented x 3. No focal deficits.   MUSCULOSKELETAL: No joint swelling or tenderness.   SKIN: No jaundice. No rashes or lesions.     Labs, reviewed with patient in clinic today:  CBC RESULTS:  Lab Results   Component Value Date    WBC 9.3 07/26/2023    WBC 5.4 08/26/2020    RBC 2.77 (L) 07/26/2023    RBC 3.58 (L) 08/26/2020    HGB 8.5 (L) 07/26/2023    HGB 11.2 (L) 08/26/2020    HCT 27.9 (L) 07/26/2023    HCT 36.0 08/26/2020     (H) 07/26/2023     (H) 08/26/2020    MCH 30.7 07/26/2023    MCH 31.3 08/26/2020    MCHC 30.5 (L) 07/26/2023    MCHC 31.1 (L) 08/26/2020    RDW 15.1 (H) 07/26/2023    RDW 14.9 08/26/2020    PLT 84 (L) 07/26/2023     (L) 08/26/2020       CMP RESULTS:  Lab Results   Component Value Date     07/23/2023     08/26/2020    POTASSIUM 3.7 07/23/2023    POTASSIUM 4.8 11/21/2022    POTASSIUM 4.4 08/26/2020    CHLORIDE 112 (H) 07/23/2023    CHLORIDE 110 (H) 11/21/2022    CHLORIDE 111 (H) 08/26/2020    CO2 21 (L) 07/23/2023    CO2 24 11/21/2022    CO2 24 08/26/2020    ANIONGAP 10 07/23/2023    ANIONGAP 6 11/21/2022    ANIONGAP 8 08/26/2020    GLC 87 07/23/2023    GLC 79 11/21/2022    GLC 84 08/26/2020    BUN 16.1 07/23/2023    BUN 35 (H) 11/21/2022    BUN 41 (H) 08/26/2020    CR 1.57 (H) 07/23/2023    CR 1.70 (H) 08/26/2020    GFRESTIMATED 32 (L) 07/23/2023    GFRESTIMATED 27 (L) 08/26/2020    GFRESTBLACK 32 (L) 08/26/2020    ALEXANDRO 8.2 (L) 07/23/2023    ALEXANDRO 9.0 08/26/2020    BILITOTAL 0.4 07/20/2023    BILITOTAL 0.4 06/25/2018    ALBUMIN 4.1 07/19/2023    ALBUMIN 3.8 11/21/2022    ALBUMIN 3.6 08/26/2020    ALKPHOS 74 07/19/2023     ALKPHOS 104 06/25/2018    ALT 11 07/19/2023    ALT 14 06/25/2018    AST 31 07/19/2023    AST 15 06/25/2018        INR RESULTS:  Lab Results   Component Value Date    INR 1.45 (H) 07/20/2023    INR 1.08 12/27/2013       Lab Results   Component Value Date    MAG 2.1 08/08/2022    MAG 2.4 (H) 01/06/2020     No results found for: NTBNPI  Lab Results   Component Value Date    NTBNP 1,423 06/28/2023       EKG 6/15/23: A-paced HR 63        Echocardiogram 7/17/23:    Procedure  Echocardiogram with two-dimensional, color and spectral Doppler performed.  ______________________________________________________________________________  Interpretation Summary  Left ventricular function is normal.The ejection fraction is 55-60%. There is  mild hypokinesis of the basal and mid inferior segments.  The right ventricle is normal size. Global right ventricular function is  normal.  No significant valvular abnormalities.  The estimated PA systolic pressure is 32 mmHg.  IVC diameter <2.1 cm collapsing >50% with sniff suggests a normal RA pressure  of 3 mmHg.  This study was compared with the study from 5/17/22. No significant changes  noted, but direct visual comparison of the inferior wall motion abnormalities  is difficult due to frequent PVCs on the prior study.     ______________________________________________________________________________  Left Ventricle  Left ventricular function is normal.The ejection fraction is 55-60%. Left  ventricular wall thickness is normal. Left ventricular size is normal. Left  ventricular diastolic function is indeterminate. There is mild hypokinesis of  the basal and mid inferior segments.     Right Ventricle  The right ventricle is normal size. Global right ventricular function is  normal. A pacemaker lead is noted in the right ventricle.     Atria  Moderate biatrial enlargement is present.     Mitral Valve  Mild mitral insufficiency is present. The mitral regurgitation is eccentric  and posteriorly  directed. EROA 0.12 cm2.     Aortic Valve  The aortic valve is tricuspid. On Doppler interrogation, there is no  significant stenosis or regurgitation.     Tricuspid Valve  Mild tricuspid insufficiency is present. Right ventricular systolic pressure  is 29mmHg above the right atrial pressure.     Pulmonic Valve  Mild pulmonic insufficiency is present.     Vessels  Annulus 2.8 cm. Ascending aorta 3.1 cm. When indexed to BSA, aortic root is  normal. IVC diameter <2.1 cm collapsing >50% with sniff suggests a normal RA  pressure of 3 mmHg.     Pericardium  No pericardial effusion is present.     Compared to Previous Study  This study was compared with the study from 22 . No significant changes  noted, but direct visual comparison of the inferior wall motion abnormalities  is difficult due to frequent PVCs on the prior study.     Attestation  I have personally viewed the imaging and agree with the interpretation and  report as documented by the fellow, Karson Yip, and/or edited by me.  ______________________________________________________________________________  MMode/2D Measurements & Calculations  IVSd: 1.1 cm  LVIDd: 4.3 cm  LVIDs: 3.0 cm  LVPWd: 0.78 cm  FS: 29.6 %  LV mass(C)d: 126.3 grams  LV mass(C)dI: 69.1 grams/m2  Ao root diam: 2.8 cm  asc Aorta Diam: 3.1 cm  LVOT diam: 2.1 cm  LVOT area: 3.4 cm2  LA Volume (BP): 68.7 ml     LA Volume Index (BP): 37.5 ml/m2  RWT: 0.36     Doppler Measurements & Calculations  MV E max tawanna: 82.3 cm/sec  MV A max tawanna: 78.1 cm/sec  MV E/A: 1.1  MV max P.0 mmHg  MV mean P.7 mmHg  MV V2 VTI: 22.5 cm  MV dec time: 0.21 sec  MR PISA: 1.6 cm2  MR ERO: 0.09 cm2  MR volume: 18.9 ml  TR max tawanna: 267.7 cm/sec  TR max P.7 mmHg  E/E' av.6  Lateral E/e': 9.7  Medial E/e': 13.5  RV S Tawanna: 11.5 cm/sec     ______________________________________________________________________________  Report approved by: Zerha Perez 2023 11:45 AM    Coronary Angiogram  6/17/23:    Indications    Coronary artery disease of native artery of native heart with stable angina pectoris (H) [I25.118 (ICD-10-CM)]   Shortness of breath [R06.02 (ICD-10-CM)]     Comments/Patient Narrative    87-year-old female with an extensive cardiac history (see below), CKD III, SSS s/p DC PPM with 84% pacing rate who presents for [facility] with complaints of HIGGINS.    Summary of CAD history:   1. 10/27/2002: AMI s/p PCI+BMS (3.5x18mm Bx velocity) to mRCA   2. 2/5/2003: Back pain; PCI+stent to mLCX c/b distal embolization/slow flow   3. 4/11/2003: Unstable angina; PCI+stent (Hepacoat velocity) to mLAD   4. 11/27/2007: PCI+DESx2 to pLAD (IVUS w/ calcification of LMCA and ulcerated plaque pLAD, 80% dRCA; also had 80% dLCX, too small to stent)   5. 12/11/2007: Staged PCI. PCI+DESx1 (3.5x13mm Cypher) to dRCA (indefinite DAPT recommended at this time)   6. 6/27/2008: Angina. mLCX stent 70% ISR. LAD and RCA stents patent. Myocardium at risk from LCX felt to be small, medical management preferred to PCI.   7. 11/10/2009: Angina. Findings unchanged from 6/27/2008, FFR LAD 0.90. Medical management recommended.   8. 7/23/2013: Unstable angina; PCI+ALVAREZ to mPDA. Diagnostic findings: LMCA 40% distal. LAD: pLAD stents patent mLAD 30% ISR dLAD 50% diffuse, D2 diffuse disease. LCX 80% mid ISR. RCA diffuse <30% mid, RPLAs diffuse disease, RPDA 100% occlusion.     Pre Procedure Diagnosis    ACS > 24 hoursstable known CADother    Post Procedure Diagnosis    Multivessel CAD      Conclusion    Right sided filling pressures are moderately elevated.  Mild pulmonary hypertension.  Pulmonary capillary wedge pressures are normal.  Normal cardiac output.  90% distal RCA in stent restenotic lesion s/p successful PCI using a 2.75*32 mm Synergy ALVAREZ post dilated using a 3.0mm NC.  50-60% proximal RCA instent restenotic lesion. Can plan hemodynamic assessment of this lesion if persistently symptomatic.  50-60% ISR of the mid LCX stent - the  vessel is small in this segment. Conservative management of this is recommended.         Plan      Follow bedrest per protocol    Continued medical management and lifestyle modifications for cardiovascular risk factor optimizations.    Follow up with Dr. Santoyo.    Arterial sheath removed from femoral artery with closure device.    Femoral angiogram identifies arterial sheath placement suitable for closure device.    Cardiac rehabilitation.    Discharge today per protocol      1. Continue ASA 81mg and Plavix 75mg  2. Continue guideline directed medical management for secondary prevention of CAD     Coronary Findings    Diagnostic  Dominance: Right  Left Anterior Descending   The vessel is small.   Ost LAD to Prox LAD lesion is 50% stenosed. The lesion was previously treated using a stent of unknown type.   Prox LAD to Mid LAD lesion is 40% stenosed. The lesion was previously treated using a stent of unknown type.      First Diagonal Branch   The vessel is small.   1st Diag lesion is 85% stenosed.      Second Diagonal Branch   2nd Diag lesion is 70% stenosed.      Left Circumflex   The vessel is small.   Prox Cx to Mid Cx lesion is 55% stenosed. The lesion was previously treated using a stent of unknown type.      First Obtuse Marginal Branch   The vessel is large.      Second Obtuse Marginal Branch   The vessel is small.      Right Coronary Artery   The vessel is moderate in size.   Mid RCA to Dist RCA lesion is 90% stenosed. The lesion was previously treated using a stent of unknown type.   Dist RCA lesion is 30% stenosed. The lesion was previously treated using a stent of unknown type.      Right Posterior Descending Artery   There is mild diffuse disease throughout the vessel.   RPDA-1 lesion is 65% stenosed.   RPDA-2 lesion is 50% stenosed. The lesion was previously treated using a stent of unknown type.      Right Posterior Atrioventricular Artery   There is mild diffuse disease throughout the vessel.          Intervention     Mid RCA to Dist RCA lesion   Stent (Also treats lesions: Dist RCA)   Lesion length: 28 mm. A CATH GUIDING BLUE YELLOW PTFE [id number] guide catheter was successfully placed. The GUIDEWIRE VASC [id number] RUNTHROUGH  crossed the lesion. The pre-interventional distal flow is normal (BREANNA 3). A STENT CORONARY ALVAREZ SYNERGY XD MR US 2.06K24VX [id number] drug eluting stent was successfully placed. Pre-stent angioplasty was performed using a CATH BALLOON EMERGE 2.0X20MM [id number] supply. . Post-stent angioplasty was performed using a CATH BALLOON NC EMERGE [id number] supply. . The post-interventional distal flow is normal (BREANNA 3). The intervention was successful. No complications occurred at this lesion.   There is a 0% residual stenosis post intervention.      Dist RCA lesion   Stent (Also treats lesions: Mid RCA to Dist RCA)   See details in Mid RCA to Dist RCA lesion.   There is a 0% residual stenosis post intervention.           Hemodynamics    RA 13  RV 46/13  PA 46/16 (26)  PCWP 15    Jamila CO 4.76 CI 2.6  PVR by Jamila CO 2.3 Right sided filling pressures are moderately elevated. Left sided filling pressures are normal. Mild elevated pulmonary hypertension. Normal cardiac output level. Hemodynamic data has been modified in Epic per physician review.     Pressures Phase: Baseline     Time Systolic (mmHg) Diastolic (mmHg) Mean (mmHg) A Wave (mmHg) V Wave (mmHg) EDP (mmHg) Max dp/dt (mmHg/sec) HR (bpm) Content (mL/dL) SAT (%)   RA Pressures  3:36 PM   10    13    14      53        RV Pressures  3:16 PM       291           3:36 PM 45    2       15     50        PA Pressures  3:38 PM 44    14    26        53        PCW Pressures  3:38 PM   16    15    20      53        AO Pressures  4:03     61    87        61          Blood Flow Results Phase: Baseline     Time Results  Indexed Values (L/min/m2)   QP  3:16 PM 4.76 L/min    2.59      QS  3:16 PM 4.76  L/min    2.59        Blood Oximetry Phase: Baseline     Time Hb  SAT(%)  PO2  Content (mL/dL) PA Sat (%)   PA  3:16 PM  63 %      63      Art  3:16 PM  96 %     13.58         Cardiac Output Phase: Baseline     Time TDCO (L/min) TDCI (L/min/m2) Jamila C.O. (L/min) Jamila C.I. (L/min/m2) Jamila HR (bpm)   Cardiac Output Results  3:16 PM   4.76    2.59         Resistance Results Phase: Baseline     Time PVR  SVR  TPR  TVR  PVR/SVR  TPR/TVR    Resistance Results (Metric)  3:16 .16 dsc-5    1294.81 dsc-5    437.21 dsc-5    1462.96 dsc-5    0.13    0.3      Resistance Results (Wood)  3:16 PM 2.1 HIDALGO    16.19 HIDALGO    5.47 HIDALGO    18.29 HIDALGO    0.13    0.3        Stoke Volume Results Phase: Baseline     Time RVSW (gm*m) LVSW (gm*m) RVSW-I (gm*m/m2) LVSW-I (gm*m/m2)   Stroke Work Results  3:16 PM 19.53    75.29    10.63    40.98          Cardiac Device Interrogation 7/24/23:  Yellow Alert received for AT/AF Daily [place] > Threshold, Fast V Rate During AT/AF, 11 hours in AT/AF Since Last Session     Remote pacemaker transmission received and reviewed. Device transmission sent per MD orders.     Device: Medtronic [id number] Beatrice XT DR MRI  Normal Device Function  Mode: AAIR<=>DDDR  bpm  AP: 53.3%  : 0.2%  Presenting EGM: AF with vent rate ~ 60-70's bpm  Short V-V intervals: 0  Lead Trends Appear Stable: Yes  Estimated battery longevity to RRT = 9.6 years  Atrial Arrhythmia: 3 AT/AF episodes recorded. Patient has been in AF since 7/23/23 @ 1309.  AF [place]: 3.3%  Anticoagulant: Eliquis  Ventricular Arrhythmia: 0  Pt Notified of Transmission Results: Yes, via telephone. Patient reports that she is feeling well and denies symptoms. Requested that patient send another remote pacemaker transmission today. Transmission reviewed. Patient is no longer in AF. The episode lasted 11 hours 30 minutes in duration.    Assessment and Plan:   Ms. Mansfield is a 88 year old female with a PMH of CAD (PCI at all 3 vessels at various times from  3107-7042, known 80% ISR of LCx) on indefinite DAPT, sick sinus syndrome s/p dual-chamber PPM, and newly diagnosed atrial fibrillation.    # Multivessel CAD s/p (PCI at all 3 vessels at various times from 8935-0784, known 80% ISR of LCx)  # Shortness of breath  See above for full cardiac cath history. Underwent angiogram that showed in-stent lesion in dRCA,, underwent DESx1 dRCA. Course complicated by GI bleeding requiring colonic clips.   - DAPT: Eliquis + Plavix 75mg daily  - Continue statin    # Moderate mitral regurgitation  # Moderate-to-severe tricuspid regurgitation  Last TTE 7/2023 with EF 55-60%  - Most recent echo stable with no significant changes to prior studies,.  - Continue Lasix, Hydral, and isosorbide mononitrate for afterload reduction    # Renal hypertension  # CKD stage 4  Creatinine downtrending from SHAJI of 2 during hospitalization. Pt notes home -130/50s. BP elevated in clinic today.   - Amlodipine 10mg daily  - Furosemide 40mg BID  - Hydralazine dose increased to 30mg TID  - Isosorbide mononitrate 40mg TID  - Lopressor 100mg BID  - Followed by Odilia Martin and Scar    # Paroxysmal atrial fibrillation  # SSS s/p dual chamber PPM  [id number] = 5 (HTN, age, CAD, female). Recent device interrogation showed recent AF episode of [id number], with ventricular rate ~60-70s.   - Eliquis 2.5mg BID  - Rate control: Lopressor 100mg BID  - Routine clinic f/u with device interrogation    # Acute on chronic anemia   # Melena 2/2 bleeding colonic angiodysplastic lesion  Admited inpatient for melena after stent, EGD unremarkable with no active bleeding, colonoscopy with bleeding colonic angiodysplastic lesion with clips placed.   - Hgb this week 8.5 (up from 7.4)  - Denies any melena or hematuria    Follow up: 6 months with gen cardiology, with Randa Ann in Structural clinic in October    Chart review time today: 8 minutes  Visit time today: 22 minutes  Total time spent today: 30  minutes        JI PEREIRA CNP  General Cardiology   07/31/23            Please do not hesitate to contact me if you have any questions/concerns.     Sincerely,     JI PEREIRA CNP

## 2023-08-09 ENCOUNTER — OFFICE VISIT (OUTPATIENT)
Dept: FAMILY MEDICINE | Facility: CLINIC | Age: 88
End: 2023-08-09
Payer: COMMERCIAL

## 2023-08-09 VITALS
DIASTOLIC BLOOD PRESSURE: 71 MMHG | SYSTOLIC BLOOD PRESSURE: 151 MMHG | HEIGHT: 66 IN | HEART RATE: 87 BPM | WEIGHT: 165.5 LBS | BODY MASS INDEX: 26.6 KG/M2 | OXYGEN SATURATION: 96 %

## 2023-08-09 DIAGNOSIS — K21.9 GASTROESOPHAGEAL REFLUX DISEASE WITHOUT ESOPHAGITIS: ICD-10-CM

## 2023-08-09 DIAGNOSIS — I10 ESSENTIAL HYPERTENSION: ICD-10-CM

## 2023-08-09 DIAGNOSIS — Z00.00 ENCOUNTER FOR MEDICARE ANNUAL WELLNESS EXAM: ICD-10-CM

## 2023-08-09 DIAGNOSIS — M10.00 IDIOPATHIC GOUT, UNSPECIFIED CHRONICITY, UNSPECIFIED SITE: ICD-10-CM

## 2023-08-09 DIAGNOSIS — D64.9 ANEMIA, UNSPECIFIED TYPE: Primary | ICD-10-CM

## 2023-08-09 PROCEDURE — G0439 PPPS, SUBSEQ VISIT: HCPCS | Performed by: FAMILY MEDICINE

## 2023-08-09 NOTE — PROGRESS NOTES
Medicare Annual Wellness Questionnaire:  This 88 year old year old female presents for a Medicare Wellness Exam.    Patient Care Team         Relationship Specialty Notifications Start End    Pedro Gomez MD PCP - General Family Practice  10/28/14     Referring to ENT    Phone: [phone] Fax: [phone]          [facility] [address]    Adriana Lilly MD MD Urology  1/19/17     Phone: [phone] Fax: [phone]         [address] [place] [address] [address] [facility] [address]    Lilly Ritchie RN Nurse Coordinator  Admissions 11/13/18     Pager: [phone]          [facility] [address]    Tyrell Santoyo MD MD Cardiology Admissions 11/21/18     Phone: [phone] Fax: [phone]         [address] [place] [address]    Tyrell Santoyo MD Assigned Heart and Vascular Provider   5/16/21     Phone: [phone] [address] [facility] [address]    Henny Larry MD MD Otolaryngology  10/1/21     Phone: [phone] Fax: [phone]         [address] [facility] [address]    Jorge Singh MD Assigned Pulmonology Provider   2/27/22     Phone: [phone] Fax: [phone]         [address] [place] [address] [address] [facility] [address]    Rivka Chou RN Specialty Care Coordinator Cardiology Admissions 5/9/22     Phone: [phone]         Akira Mendoza MD Assigned Musculoskeletal Provider   5/28/22     Phone: [phone] Fax: [phone]         [address] [facility] [address]    Marzena Martin MD Assigned Nephrology Provider   8/13/22     Phone: [phone] Fax: [phone]         [address] [facility] [address] [facility] [address]    Chad Blair MD Assigned Pain Medication Provider   7/22/23     Phone: [phone] Fax: [phone] [address] [place] [address]    Pedro Gomez MD Assigned PCP   7/29/23     Phone: [phone] Fax: [phone]          [address] [facility] [address]            Fall Risk Assessment:  Have you fallen 2 or more times in the last year?  No    How many times were you injured due to a fall in the last year?  0    PHQ-2:  Over the last 2 weeks, how often have you been bothered by feeling down, depressed, or hopeless?  Not at all (0)     Over the last 2 weeks, how often have you had little interest or pleasure in doing things?  Not at all (0)     Social History:  What is your marital status?      Who lives in your household?  Morgan -     Does your home have loose rugs in the hallway:     No    Does your home have grab bars in the bathroom:    Yes     Does your home have handrails on the stairs?  Yes     Does your home have poorly lit areas?    No    Do you feel threatened or controlled by a partner, ex-partner or anyone in your life?   No    Has anyone hurt you physically, for example by pushing, hitting, slapping or kicking you or forcing you to have sex?   No    Do you need help with the phone, transportation, shopping, preparing meals, housework, laundry, medications or managing money?   No    Sexual Health:  Are you sexually active?    No    If yes, with men, women, or both?  N/A    If yes, how many partners?  N/A    If yes, are you using condoms?    N/A    Have you had any sexually transmitted infections in the last year?   No    Do you have any sexual concerns?    No    Women Only:  Women: What year did you stop having periods (approximate age)?  Pt did not complete    Women: Any vaginal bleeding in the last year?    No    Women: Have you ever had an abnormal Pap smear?    No    General Health Assessment:  Have you noticed any hearing difficulties?   Yes     Do you wear hearing aids?   No    Have you seen a hearing professional such as an audiologist in the last 1 year?   No    Do you have vision difficulty?    Yes     Do you wear glasses or contacts?   Yes     Have you seen an eye doctor in the last 1  year?   Yes     How many servings of fruits and vegetables do you eat a day?  Fruit: 3  Vegetables: 3    How often do you exercise in a week?  4    How long and what kind of exercise do you do?  Walking    Tobacco and Alcohol History:  Do you use tobacco/nicotine products?    No    If yes, please list the method of use and average weekly consumption?  N/A    Do you use any other drugs?   No         Do you drink alcohol?   No    If you drink alcohol, how many drinks per week?  N/A    Advance Directive:  Have you completed an Advance Directives document?  No    If yes, have you given a copy to the clinic?   No    Do you need information on Advance Directives?   No    Dewey Mendoza, EMT at 3:43 PM on 8/9/2023

## 2023-08-09 NOTE — NURSING NOTE
"Tessa Mansfield is a 88 year old female patient that presents today in clinic for the following:    Chief Complaint   Patient presents with    Medicare Visit    Physical     The patient's allergies and medications were reviewed as noted. A set of vitals were recorded as noted without incident: BP (!) 151/71 (BP Location: Right arm, Patient Position: Sitting, Cuff Size: Adult Regular)   Pulse 87   Ht 1.666 m (5' 5.59\")   Wt 75.1 kg (165 lb 8 oz)   SpO2 96%   BMI 27.05 kg/m  . The patient does not have any other questions for the provider.    Dewey Mendoza, EMT 2:54 PM on 8/9/2023   "

## 2023-08-09 NOTE — PROGRESS NOTES
SUBJECTIVE:   Tessa is a 88 year old who presents for Preventive Visit.    Are you in the first 12 months of your Medicare coverage?  No    HPI Doing well now; recent GI bleed, notes reviewed, will recheck labs. No GI bleed sx since.         Have you ever done Advance Care Planning? (For example, a Health Directive, POLST, or a discussion with a medical provider or your loved ones about your wishes): see other note      Fall risk  None    Cognitive Screening Normal clock draw and three word recall        Reviewed and updated as needed this visit by clinical staff    Allergies               Reviewed and updated as needed this visit by Provider                 Social History     Tobacco Use    Smoking status: Former     Packs/day: 0.30     Years: 15.00     Pack years: 4.50     Types: Cigarettes    Smokeless tobacco: Never    Tobacco comments:     Quit 22+ years ago   Substance Use Topics    Alcohol use: Not on file            No data to display              Do you have a current opioid prescription? No  Do you use any other controlled substances or medications that are not prescribed by a provider? None        Current providers sharing in care for this patient include:     The following health maintenance items are reviewed in Epic and correct as of today:  Health Maintenance   Topic Date Due    HF ACTION PLAN  Never done    ASTHMA ACTION PLAN  Never done    ASTHMA CONTROL TEST  Never done    ADVANCE CARE PLANNING  Never done    COVID-19 Vaccine (4 - Moderna series) 01/18/2022    MAMMO SCREENING  02/18/2022    MEDICARE ANNUAL WELLNESS VISIT  06/15/2022    ZOSTER IMMUNIZATION (3 of 3) 07/09/2022    DTAP/TDAP/TD IMMUNIZATION (3 - Td or Tdap) 02/21/2023    LIPID  06/17/2023    INFLUENZA VACCINE (1) 09/01/2023    MICROALBUMIN  09/28/2023    BMP  10/23/2023    HEMOGLOBIN  01/26/2024    ALT  07/19/2024    CBC  07/26/2024    FALL RISK ASSESSMENT  08/09/2024    COLORECTAL CANCER SCREENING  07/21/2033    PARATHYROID   "Completed    PHOSPHORUS  Completed    TSH W/FREE T4 REFLEX  Completed    PHQ-2 (once per calendar year)  Completed    Pneumococcal Vaccine: 65+ Years  Completed    URINALYSIS  Completed    ALK PHOS  Completed    IPV IMMUNIZATION  Aged Out    MENINGITIS IMMUNIZATION  Aged Out       Pertinent mammograms are reviewed under the imaging tab.    Review of Systems  Ten pt ROS o/w negative    OBJECTIVE:   BP (!) 151/71 (BP Location: Right arm, Patient Position: Sitting, Cuff Size: Adult Regular)   Pulse 87   Ht 1.666 m (5' 5.59\")   Wt 75.1 kg (165 lb 8 oz)   SpO2 96%   BMI 27.05 kg/m   Estimated body mass index is 27.05 kg/m  as calculated from the following:    Height as of this encounter: 1.666 m (5' 5.59\").    Weight as of this encounter: 75.1 kg (165 lb 8 oz).  Physical Exam  GENERAL: healthy, alert and no distress  EYES: Eyes grossly normal to inspection, PERRL and conjunctivae and sclerae normal  HENT: ear canals and TM's normal, nose and mouth without ulcers or lesions  NECK: no adenopathy, no asymmetry, masses, or scars and thyroid normal to palpation  RESP: lungs clear to auscultation - no rales, rhonchi or wheezes  CV: regular rate and rhythm, normal S1 S2, no S3 or S4, no murmur, click or rub, no peripheral edema and peripheral pulses strong  ABDOMEN: soft, nontender, no hepatosplenomegaly, no masses and bowel sounds normal  MS: no gross musculoskeletal defects noted, no edema  SKIN: no suspicious lesions or rashes  NEURO: Normal strength and tone, mentation intact and speech normal  PSYCH: mentation appears normal, affect normal/bright        ASSESSMENT / PLAN:       ICD-10-CM    1. Anemia, unspecified type  D64.9 Iron and iron binding capacity     Ferritin     Vitamin B12     Folate     CBC with platelets and differential     Basic metabolic panel  (Ca, Cl, CO2, Creat, Gluc, K, Na, BUN)      2. Idiopathic gout, unspecified chronicity, unspecified site  M10.00       3. Essential hypertension  I10       4. " "Gastroesophageal reflux disease without esophagitis  K21.9           Patient has been advised of split billing requirements and indicates understanding: Yes  MED REC REQUIRED  Post Medication Reconciliation Status:       COUNSELING:  Reviewed preventive health counseling, as reflected in patient instructions       Regular exercise       Healthy diet/nutrition      BMI:   Estimated body mass index is 27.05 kg/m  as calculated from the following:    Height as of this encounter: 1.666 m (5' 5.59\").    Weight as of this encounter: 75.1 kg (165 lb 8 oz).         She reports that she has quit smoking. Her smoking use included cigarettes. She has a 4.50 pack-year smoking history. She has never used smokeless tobacco.      Appropriate preventive services were discussed with this patient, including applicable screening as appropriate for cardiovascular disease, diabetes, osteopenia/osteoporosis, and glaucoma.  As appropriate for age/gender, discussed screening for colorectal cancer, prostate cancer, breast cancer, and cervical cancer. Checklist reviewing preventive services available has been given to the patient.    Reviewed patients plan of care and provided an AVS. The Basic Care Plan (routine screening as documented in Health Maintenance) for Tessa meets the Care Plan requirement. This Care Plan has been established and reviewed with the Patient.      Pedro Gomez MD  [facility] PRIMARY CARE [facility]    Identified Health Risks:  I have reviewed Opioid Use Disorder and Substance Use Disorder risk factors and made any needed referrals.   Family History   Problem Relation Age of Onset    Cancer Sister 82        bladder cancer       "

## 2023-08-09 NOTE — PATIENT INSTRUCTIONS
Patient Education   Personalized Prevention Plan  You are due for the preventive services outlined below.  Your care team is available to assist you in scheduling these services.  If you have already completed any of these items, please share that information with your care team to update in your medical record.  Health Maintenance Due   Topic Date Due     Heart Failure Action Plan  Never done     Asthma Action Plan - yearly  Never done     Asthma Control Test  Never done     Discuss Advance Care Planning  Never done     COVID-19 Vaccine (4 - Moderna series) 01/18/2022     Mammogram  02/18/2022     Zoster (Shingles) Vaccine (3 of 3) 07/09/2022     Diptheria Tetanus Pertussis (DTAP/TDAP/TD) Vaccine (3 - Td or Tdap) 02/21/2023     Cholesterol Lab  06/17/2023

## 2023-08-10 ENCOUNTER — TELEPHONE (OUTPATIENT)
Dept: FAMILY MEDICINE | Facility: CLINIC | Age: 88
End: 2023-08-10
Payer: COMMERCIAL

## 2023-08-11 ENCOUNTER — LAB (OUTPATIENT)
Dept: LAB | Facility: CLINIC | Age: 88
End: 2023-08-11
Payer: COMMERCIAL

## 2023-08-11 ENCOUNTER — TELEPHONE (OUTPATIENT)
Dept: FAMILY MEDICINE | Facility: CLINIC | Age: 88
End: 2023-08-11
Payer: COMMERCIAL

## 2023-08-11 DIAGNOSIS — D64.9 ANEMIA, UNSPECIFIED TYPE: ICD-10-CM

## 2023-08-11 LAB
BASOPHILS # BLD AUTO: 0.1 10E3/UL (ref 0–0.2)
BASOPHILS NFR BLD AUTO: 1 %
EOSINOPHIL # BLD AUTO: 0.1 10E3/UL (ref 0–0.7)
EOSINOPHIL NFR BLD AUTO: 1 %
ERYTHROCYTE [DISTWIDTH] IN BLOOD BY AUTOMATED COUNT: 14.6 % (ref 10–15)
HCT VFR BLD AUTO: 29.6 % (ref 35–47)
HGB BLD-MCNC: 8.9 G/DL (ref 11.7–15.7)
IMM GRANULOCYTES # BLD: 0 10E3/UL
IMM GRANULOCYTES NFR BLD: 0 %
LYMPHOCYTES # BLD AUTO: 2.3 10E3/UL (ref 0.8–5.3)
LYMPHOCYTES NFR BLD AUTO: 40 %
MCH RBC QN AUTO: 30 PG (ref 26.5–33)
MCHC RBC AUTO-ENTMCNC: 30.1 G/DL (ref 31.5–36.5)
MCV RBC AUTO: 100 FL (ref 78–100)
MONOCYTES # BLD AUTO: 1.2 10E3/UL (ref 0–1.3)
MONOCYTES NFR BLD AUTO: 21 %
NEUTROPHILS # BLD AUTO: 2.1 10E3/UL (ref 1.6–8.3)
NEUTROPHILS NFR BLD AUTO: 37 %
NRBC # BLD AUTO: 0 10E3/UL
NRBC BLD AUTO-RTO: 0 /100
PLATELET # BLD AUTO: 91 10E3/UL (ref 150–450)
RBC # BLD AUTO: 2.97 10E6/UL (ref 3.8–5.2)
WBC # BLD AUTO: 5.8 10E3/UL (ref 4–11)

## 2023-08-11 PROCEDURE — 82728 ASSAY OF FERRITIN: CPT

## 2023-08-11 PROCEDURE — 82607 VITAMIN B-12: CPT

## 2023-08-11 PROCEDURE — 83550 IRON BINDING TEST: CPT

## 2023-08-11 PROCEDURE — 82746 ASSAY OF FOLIC ACID SERUM: CPT

## 2023-08-11 PROCEDURE — 85025 COMPLETE CBC W/AUTO DIFF WBC: CPT

## 2023-08-11 PROCEDURE — 80048 BASIC METABOLIC PNL TOTAL CA: CPT

## 2023-08-11 PROCEDURE — 83540 ASSAY OF IRON: CPT

## 2023-08-11 PROCEDURE — 36415 COLL VENOUS BLD VENIPUNCTURE: CPT

## 2023-08-12 LAB
ANION GAP SERPL CALCULATED.3IONS-SCNC: 13 MMOL/L (ref 7–15)
BUN SERPL-MCNC: 37.7 MG/DL (ref 8–23)
CALCIUM SERPL-MCNC: 9.2 MG/DL (ref 8.8–10.2)
CHLORIDE SERPL-SCNC: 105 MMOL/L (ref 98–107)
CREAT SERPL-MCNC: 2.33 MG/DL (ref 0.51–0.95)
DEPRECATED HCO3 PLAS-SCNC: 24 MMOL/L (ref 22–29)
FERRITIN SERPL-MCNC: 54 NG/ML (ref 11–328)
FOLATE SERPL-MCNC: >40 NG/ML (ref 4.6–34.8)
GFR SERPL CREATININE-BSD FRML MDRD: 20 ML/MIN/1.73M2
GLUCOSE SERPL-MCNC: 82 MG/DL (ref 70–99)
IRON BINDING CAPACITY (ROCHE): 334 UG/DL (ref 240–430)
IRON SATN MFR SERPL: 14 % (ref 15–46)
IRON SERPL-MCNC: 46 UG/DL (ref 37–145)
POTASSIUM SERPL-SCNC: 4.6 MMOL/L (ref 3.4–5.3)
SODIUM SERPL-SCNC: 142 MMOL/L (ref 136–145)
VIT B12 SERPL-MCNC: 2519 PG/ML (ref 232–1245)

## 2023-08-25 NOTE — PROGRESS NOTES
Nephrology Progress Note   8/28/2023    Assessment and Plan:   88 year old female with history of long standing HTN, CAD s/p multiple stents, who presents for followup of CKD stage 3, baseline SCr 1.8-2.2 mg/dL without proteinuria. She had another angiogram with stent done June 2022.    1. CKD stage 3- baseline SCr 1.8-2.2mg/ dL, eGFR 25-29 ml/min. Mild/ minimal proteinuria now up to > 1gram, due to ? Uncontrolled BP  - Scr improved to 1.6 during recent hospitalization, most recently up to 2.3 with eGFR 20    - had angiogram in June 2022, no labs since, will do renal panel today  Her swelling was previously occasional. She's had more swelling since taking amlodipine 10 mg daily and has been taking furosemide 40 mg BID.   - Her proteinuria is increased thus suspect BP is not ideally controlled.  - BP 130s/70s at home. Was 131/69 in clinic today.  - monitor labs, relatively stable at this time, except proteinuria  - consider ACE inhibitor , cannot see that she has been on this, given proteinuria and CKD     2. Electrolytes/Acid Base status- normal.    Hypokalemia- takes 3 potassium 10 mEq pills daily      3. Hypertension/Volume status-  She is up a little and swelling is also worse. She is on hydralazine 30 mg TID, isosorbide mononitrate 40 mg TID, metoprolol 100 mg BID, furosemide 40 mg BID   - on amlodipine 10mg she has more swelling- monitor  - BP's ~130s/70s at home  -patient reports history of reaction/intolerance to Spironolactone, Chlorthalidone, Clonidine and Terazosin in the past   - takes prn furosemide in the afternoon, and BP is close to goal.   - had new stent to RCA placed on 7/17/23 as elective procedure due to angina   - ECHO 7/17/23  Left ventricular function is normal.The ejection fraction is 55-60%. There is  mild hypokinesis of the basal and mid inferior segments.  The right ventricle is normal size. Global right ventricular function is  normal.  No significant valvular abnormalities.  The  estimated PA systolic pressure is 32 mmHg.  IVC diameter <2.1 cm collapsing >50% with sniff suggests a normal RA pressure  of 3 mmHg.    4. Anemia- hgb at 8.9, up from low 7 range during admission 8/11/2023 iron sat 14%  - low platelet count- 90s, improved from 60 range.    5. BMD  - last serum calcium and phosphorus were normal  -secondary hyperparathyroidism-PTH was 133.     Assessment and plan was discussed with patient and she voiced her understanding and agreement.    #Disposition: follow up with Dr. Martin in October as planned.     Reason for Visit:  Tessa Mansfield is an 88 year old female with HTN, who presents for CKD management.     HPI:  She is a pleasant female with PMMHx significant for stage III CKD presumed secondary to hypertensive nephrosclerosis.Her renal function has been  relatively stable, ranging from 1.8-2.2 mg/dL over the years. She has history of CAD s/p multiple stents  (8 stents in all) since 2004 and a pacemaker in 2007. She follows with cardiologist Dr Santoyo and Dr Zhu.    She had elected stent placed in RCA on 7/17 to help with angina.     She was admitted from 7/19 to 7/23 for melena secondary to bleeding colonic angiodysplastic lesion, acute on chronic anemia. S/p repair.   She has been taking furosemide twice a day for a while    She feels fatigued but overall is improving since recent admission. She has chronic diarrhea, no n/v.     Home BP: 130 range    Baseline Cr: 1.8-2.2    ROS:  A comprehensive review of systems was obtained and negative, except as noted in the HPI or PMH.    Active Medical Problems:  Patient Active Problem List   Diagnosis    Degeneration of cervical intervertebral disc    Nonallopathic lesion of cervical region    Nonallopathic lesion of thoracic region    Coronary artery disease of native artery of native heart with stable angina pectoris (H)    Dizziness and giddiness    Edema    Esophageal reflux    Gout    Essential hypertension    Obstructive  sleep apnea    Osteomalacia    Post-menopausal osteoporosis    Cardiac Pacemaker- Medtronic, dual chamber- NOT dependant    Transient complete heart block (H)    Sinus node dysfunction (H)    Disturbance of skin sensation    NSTEMI (non-ST elevated myocardial infarction) (H)    Arrhythmia    Sick sinus syndrome (H)    Non-rheumatic mitral regurgitation    Non-rheumatic tricuspid valve insufficiency    CKD (chronic kidney disease) stage 4, GFR 15-29 ml/min (H)    Status post coronary angiogram    Secondary renal hyperparathyroidism (H)    Thrombocytopenia (H)    Renal hypertension    Acute on chronic diastolic (congestive) heart failure (H)    Chest pain, unspecified type    Long term (current) use of anticoagulants    Melena    General weakness    S/P coronary artery stent placement    Antiplatelet or antithrombotic long-term use    Anemia, unspecified type       Personal Hx:   Social History     Socioeconomic History    Marital status:      Spouse name: Not on file    Number of children: 4    Years of education: Not on file    Highest education level: Not on file   Occupational History     Employer: [employer]   Tobacco Use    Smoking status: Former     Packs/day: 0.30     Years: 15.00     Pack years: 4.50     Types: Cigarettes    Smokeless tobacco: Never    Tobacco comments:     Quit 22+ years ago   Substance and Sexual Activity    Alcohol use: Not on file    Drug use: No    Sexual activity: Not Currently     Partners: Male     Birth control/protection: Post-menopausal   Other Topics Concern     Service Not Asked    Blood Transfusions Yes    Caffeine Concern Not Asked    Occupational Exposure Not Asked    Hobby Hazards Not Asked    Sleep Concern Not Asked    Stress Concern Not Asked    Weight Concern Not Asked    Special Diet Not Asked    Back Care Not Asked    Exercise No    Bike Helmet Not Asked    Seat Belt Not Asked    Self-Exams Not Asked    Parent/sibling w/ CABG, MI or  "angioplasty before 65F 55M? Not Asked   Social History Narrative    Not on file     Social Determinants of Health     Financial Resource Strain: Not on file   Food Insecurity: Not on file   Transportation Needs: Not on file   Physical Activity: Not on file   Stress: Not on file   Social Connections: Not on file   Intimate Partner Violence: Not on file   Housing Stability: Not on file       Allergies:  Allergies   Allergen Reactions    Bactrim [Sulfamethoxazole W/Trimethoprim] Other (See Comments)     Patient unable to recall    Biaxin [Clarithromycin]     Chlorthalidone Nausea and Vomiting    Clonidine     Codeine Sulfate Itching    Darvon [Propoxyphene Hcl]     Dilaudid [Hydromorphone] Visual Disturbance     Hallucinations      Gabapentin Other (See Comments) and Fatigue     \"felt drunk\"    Indomethacin     Levaquin [Levofloxacin Hemihydrate]     Morphine Sulfate     Percocet [Oxycodone-Acetaminophen] Other (See Comments)     hallucinations    Pregabalin Fatigue    Pregabalin Itching     Other reaction(s): Fatigue    Shrimp Swelling    Spironolactone Other (See Comments)     Dehydrated      Terazosin     Ciprofloxacin Rash    Simvastatin Palpitations     Muscle weakness, leg cramping         Current Outpatient Medications   Medication    allopurinol (ZYLOPRIM) 100 MG tablet    amLODIPine (NORVASC) 10 MG tablet    apixaban ANTICOAGULANT (ELIQUIS) 2.5 MG tablet    budesonide (PULMICORT) 0.5 MG/2ML neb solution    clopidogrel (PLAVIX) 75 MG tablet    cyanocolbalamin (VITAMIN B-12) 1000 MCG tablet    fluticasone-salmeterol (ADVAIR) 250-50 MCG/ACT inhaler    furosemide (LASIX) 20 MG tablet    hydrALAZINE (APRESOLINE) 10 MG tablet    isosorbide mononitrate (ISMO/MONOKET) 20 MG tablet    metoprolol tartrate (LOPRESSOR) 100 MG tablet    Multiple Vitamins-Minerals (PRESERVISION AREDS 2+MULTI VIT PO)    omeprazole (PRILOSEC) 40 MG DR capsule    potassium chloride ER (K-TAB/KLOR-CON) 10 MEQ CR tablet    rosuvastatin (CRESTOR) " 20 MG tablet    timolol maleate (TIMOPTIC) 0.5 % ophthalmic solution    VITAMIN D3 25 MCG (1000 UT) tablet     No current facility-administered medications for this visit.       Vitals:  /69   Pulse 85   Wt 75.8 kg (167 lb 3.2 oz)   SpO2 99%   BMI 27.32 kg/m      Exam:  General: awake and alert, appears to be in no acute distress  Neuro: normal speech  Skin: warm and dry, no visible rash  Heart: RRR  Lungs: clear bilaterally  Extremities:no LE edema      Results:  Last Comprehensive Metabolic Panel:  Sodium   Date Value Ref Range Status   08/11/2023 142 136 - 145 mmol/L Final   08/26/2020 141 133 - 144 mmol/L Final     Potassium   Date Value Ref Range Status   08/11/2023 4.6 3.4 - 5.3 mmol/L Final   11/21/2022 4.8 3.4 - 5.3 mmol/L Final   08/26/2020 4.4 3.4 - 5.3 mmol/L Final     Chloride   Date Value Ref Range Status   08/11/2023 105 98 - 107 mmol/L Final   11/21/2022 110 (H) 94 - 109 mmol/L Final   08/26/2020 111 (H) 94 - 109 mmol/L Final     Carbon Dioxide   Date Value Ref Range Status   08/26/2020 24 20 - 32 mmol/L Final     Carbon Dioxide (CO2)   Date Value Ref Range Status   08/11/2023 24 22 - 29 mmol/L Final   11/21/2022 24 20 - 32 mmol/L Final     Anion Gap   Date Value Ref Range Status   08/11/2023 13 7 - 15 mmol/L Final   11/21/2022 6 3 - 14 mmol/L Final   08/26/2020 8 3 - 14 mmol/L Final     Glucose   Date Value Ref Range Status   08/11/2023 82 70 - 99 mg/dL Final   11/21/2022 79 70 - 99 mg/dL Final   08/26/2020 84 70 - 99 mg/dL Final     Urea Nitrogen   Date Value Ref Range Status   08/11/2023 37.7 (H) 8.0 - 23.0 mg/dL Final   11/21/2022 35 (H) 7 - 30 mg/dL Final   08/26/2020 41 (H) 7 - 30 mg/dL Final     Creatinine   Date Value Ref Range Status   08/11/2023 2.33 (H) 0.51 - 0.95 mg/dL Final   08/26/2020 1.70 (H) 0.52 - 1.04 mg/dL Final     GFR Estimate   Date Value Ref Range Status   08/11/2023 20 (L) >60 mL/min/1.73m2 Final   08/26/2020 27 (L) >60 mL/min/[1.73_m2] Final     Comment:     Non   GFR Calc  Starting 12/18/2018, serum creatinine based estimated GFR (eGFR) will be   calculated using the Chronic Kidney Disease Epidemiology Collaboration   (CKD-EPI) equation.       Calcium   Date Value Ref Range Status   08/11/2023 9.2 8.8 - 10.2 mg/dL Final   08/26/2020 9.0 8.5 - 10.1 mg/dL Final       Last Basic Metabolic Panel:  Lab Results   Component Value Date     11/08/2017      Lab Results   Component Value Date    POTASSIUM 3.5 11/08/2017     Lab Results   Component Value Date    CHLORIDE 104 11/08/2017     Lab Results   Component Value Date    ALEXANDRO 8.8 11/08/2017     Lab Results   Component Value Date    CO2 26 11/08/2017     Lab Results   Component Value Date    BUN 54 11/08/2017     Lab Results   Component Value Date    CR 2.36 11/08/2017     Lab Results   Component Value Date    GLC 88 11/08/2017       Laura Llanes Pa-c    Visit length 15 minutes. An additional 15 minutes were spent on date of service in chart review, documentation, and other acitivies as noted.

## 2023-08-28 ENCOUNTER — OFFICE VISIT (OUTPATIENT)
Dept: NEPHROLOGY | Facility: CLINIC | Age: 88
End: 2023-08-28
Payer: COMMERCIAL

## 2023-08-28 VITALS
WEIGHT: 167.2 LBS | OXYGEN SATURATION: 99 % | SYSTOLIC BLOOD PRESSURE: 131 MMHG | DIASTOLIC BLOOD PRESSURE: 69 MMHG | BODY MASS INDEX: 27.32 KG/M2 | HEART RATE: 85 BPM

## 2023-08-28 DIAGNOSIS — N18.4 ANEMIA DUE TO STAGE 4 CHRONIC KIDNEY DISEASE (H): ICD-10-CM

## 2023-08-28 DIAGNOSIS — E87.6 HYPOKALEMIA: ICD-10-CM

## 2023-08-28 DIAGNOSIS — K21.9 GASTROESOPHAGEAL REFLUX DISEASE WITHOUT ESOPHAGITIS: ICD-10-CM

## 2023-08-28 DIAGNOSIS — D63.1 ANEMIA DUE TO STAGE 4 CHRONIC KIDNEY DISEASE (H): ICD-10-CM

## 2023-08-28 DIAGNOSIS — N18.4 CHRONIC KIDNEY DISEASE, STAGE IV (SEVERE) (H): Primary | ICD-10-CM

## 2023-08-28 DIAGNOSIS — I10 ESSENTIAL HYPERTENSION: ICD-10-CM

## 2023-08-28 PROCEDURE — 99214 OFFICE O/P EST MOD 30 MIN: CPT | Performed by: PHYSICIAN ASSISTANT

## 2023-08-28 NOTE — PATIENT INSTRUCTIONS
Decrease furosemide to 40 mg once a day.   Check blood pressure daily and keep log. Nurse will call later this week for report.   Continue good hydration and low sodium diet.   Follow up with Dr. Martin as planned.

## 2023-08-28 NOTE — LETTER
8/28/2023       RE: Tessa Mansfield  [address]  [facility] [id number]     Dear Colleague,    Thank you for referring your patient, Tessa Mansfield, to the [facility] at [facility]. Please see a copy of my visit note below.    Nephrology Progress Note   8/28/2023    Assessment and Plan:   88 year old female with history of long standing HTN, CAD s/p multiple stents, who presents for followup of CKD stage 3, baseline SCr 1.8-2.2 mg/dL without proteinuria. She had another angiogram with stent done June 2022.    1. CKD stage 3- baseline SCr 1.8-2.2mg/ dL, eGFR 25-29 ml/min. Mild/ minimal proteinuria now up to > 1gram, due to ? Uncontrolled BP  - Scr improved to 1.6 during recent hospitalization, most recently up to 2.3 with eGFR 20    - had angiogram in June 2022, no labs since, will do renal panel today  Her swelling was previously occasional. She's had more swelling since taking amlodipine 10 mg daily and has been taking furosemide 40 mg BID.   - Her proteinuria is increased thus suspect BP is not ideally controlled.  - BP 130s/70s at home. Was 131/69 in clinic today.  - monitor labs, relatively stable at this time, except proteinuria  - consider ACE inhibitor , cannot see that she has been on this, given proteinuria and CKD     2. Electrolytes/Acid Base status- normal.    Hypokalemia- takes 3 potassium 10 mEq pills daily      3. Hypertension/Volume status-  She is up a little and swelling is also worse. She is on hydralazine 30 mg TID, isosorbide mononitrate 40 mg TID, metoprolol 100 mg BID, furosemide 40 mg BID   - on amlodipine 10mg she has more swelling- monitor  - BP's ~130s/70s at home  -patient reports history of reaction/intolerance to Spironolactone, Chlorthalidone, Clonidine and Terazosin in the past   - takes prn furosemide in the afternoon, and BP is close to goal.   - had new stent to RCA placed on 7/17/23 as elective procedure  due to angina   - ECHO 7/17/23  Left ventricular function is normal.The ejection fraction is 55-60%. There is  mild hypokinesis of the basal and mid inferior segments.  The right ventricle is normal size. Global right ventricular function is  normal.  No significant valvular abnormalities.  The estimated PA systolic pressure is 32 mmHg.  IVC diameter <2.1 cm collapsing >50% with sniff suggests a normal RA pressure  of 3 mmHg.    4. Anemia- hgb at 8.9, up from low 7 range during admission 8/11/2023 iron sat 14%  - low platelet count- 90s, improved from 60 range.    5. BMD  - last serum calcium and phosphorus were normal  -secondary hyperparathyroidism-PTH was 133.     Assessment and plan was discussed with patient and she voiced her understanding and agreement.    #Disposition: follow up with Dr. Martin in October as planned.     Reason for Visit:  Tessa Mansfield is an 88 year old female with HTN, who presents for CKD management.     HPI:  She is a pleasant female with PMMHx significant for stage III CKD presumed secondary to hypertensive nephrosclerosis.Her renal function has been  relatively stable, ranging from 1.8-2.2 mg/dL over the years. She has history of CAD s/p multiple stents  (8 stents in all) since 2004 and a pacemaker in 2007. She follows with cardiologist Dr Santoyo and Dr Zhu.    She had elected stent placed in RCA on 7/17 to help with angina.     She was admitted from 7/19 to 7/23 for melena secondary to bleeding colonic angiodysplastic lesion, acute on chronic anemia. S/p repair.   She has been taking furosemide twice a day for a while    She feels fatigued but overall is improving since recent admission. She has chronic diarrhea, no n/v.     Home BP: 130 range    Baseline Cr: 1.8-2.2    ROS:  A comprehensive review of systems was obtained and negative, except as noted in the HPI or PMH.    Active Medical Problems:  Patient Active Problem List   Diagnosis    Degeneration of cervical  intervertebral disc    Nonallopathic lesion of cervical region    Nonallopathic lesion of thoracic region    Coronary artery disease of native artery of native heart with stable angina pectoris (H)    Dizziness and giddiness    Edema    Esophageal reflux    Gout    Essential hypertension    Obstructive sleep apnea    Osteomalacia    Post-menopausal osteoporosis    Cardiac Pacemaker- Medtronic, dual chamber- NOT dependant    Transient complete heart block (H)    Sinus node dysfunction (H)    Disturbance of skin sensation    NSTEMI (non-ST elevated myocardial infarction) (H)    Arrhythmia    Sick sinus syndrome (H)    Non-rheumatic mitral regurgitation    Non-rheumatic tricuspid valve insufficiency    CKD (chronic kidney disease) stage 4, GFR 15-29 ml/min (H)    Status post coronary angiogram    Secondary renal hyperparathyroidism (H)    Thrombocytopenia (H)    Renal hypertension    Acute on chronic diastolic (congestive) heart failure (H)    Chest pain, unspecified type    Long term (current) use of anticoagulants    Melena    General weakness    S/P coronary artery stent placement    Antiplatelet or antithrombotic long-term use    Anemia, unspecified type       Personal Hx:   Social History     Socioeconomic History    Marital status:      Spouse name: Not on file    Number of children: 4    Years of education: Not on file    Highest education level: Not on file   Occupational History     Employer: [employer]   Tobacco Use    Smoking status: Former     Packs/day: 0.30     Years: 15.00     Pack years: 4.50     Types: Cigarettes    Smokeless tobacco: Never    Tobacco comments:     Quit 22+ years ago   Substance and Sexual Activity    Alcohol use: Not on file    Drug use: No    Sexual activity: Not Currently     Partners: Male     Birth control/protection: Post-menopausal   Other Topics Concern     Service Not Asked    Blood Transfusions Yes    Caffeine Concern Not Asked    Occupational  "Exposure Not Asked    Hobby Hazards Not Asked    Sleep Concern Not Asked    Stress Concern Not Asked    Weight Concern Not Asked    Special Diet Not Asked    Back Care Not Asked    Exercise No    Bike Helmet Not Asked    Seat Belt Not Asked    Self-Exams Not Asked    Parent/sibling w/ CABG, MI or angioplasty before 65F 55M? Not Asked   Social History Narrative    Not on file     Social Determinants of Health     Financial Resource Strain: Not on file   Food Insecurity: Not on file   Transportation Needs: Not on file   Physical Activity: Not on file   Stress: Not on file   Social Connections: Not on file   Intimate Partner Violence: Not on file   Housing Stability: Not on file       Allergies:  Allergies   Allergen Reactions    Bactrim [Sulfamethoxazole W/Trimethoprim] Other (See Comments)     Patient unable to recall    Biaxin [Clarithromycin]     Chlorthalidone Nausea and Vomiting    Clonidine     Codeine Sulfate Itching    Darvon [Propoxyphene Hcl]     Dilaudid [Hydromorphone] Visual Disturbance     Hallucinations      Gabapentin Other (See Comments) and Fatigue     \"felt drunk\"    Indomethacin     Levaquin [Levofloxacin Hemihydrate]     Morphine Sulfate     Percocet [Oxycodone-Acetaminophen] Other (See Comments)     hallucinations    Pregabalin Fatigue    Pregabalin Itching     Other reaction(s): Fatigue    Shrimp Swelling    Spironolactone Other (See Comments)     Dehydrated      Terazosin     Ciprofloxacin Rash    Simvastatin Palpitations     Muscle weakness, leg cramping         Current Outpatient Medications   Medication    allopurinol (ZYLOPRIM) 100 MG tablet    amLODIPine (NORVASC) 10 MG tablet    apixaban ANTICOAGULANT (ELIQUIS) 2.5 MG tablet    budesonide (PULMICORT) 0.5 MG/2ML neb solution    clopidogrel (PLAVIX) 75 MG tablet    cyanocolbalamin (VITAMIN B-12) 1000 MCG tablet    fluticasone-salmeterol (ADVAIR) 250-50 MCG/ACT inhaler    furosemide (LASIX) 20 MG tablet    hydrALAZINE (APRESOLINE) 10 MG " tablet    isosorbide mononitrate (ISMO/MONOKET) 20 MG tablet    metoprolol tartrate (LOPRESSOR) 100 MG tablet    Multiple Vitamins-Minerals (PRESERVISION AREDS 2+MULTI VIT PO)    omeprazole (PRILOSEC) 40 MG DR capsule    potassium chloride ER (K-TAB/KLOR-CON) 10 MEQ CR tablet    rosuvastatin (CRESTOR) 20 MG tablet    timolol maleate (TIMOPTIC) 0.5 % ophthalmic solution    VITAMIN D3 25 MCG (1000 UT) tablet     No current facility-administered medications for this visit.       Vitals:  /69   Pulse 85   Wt 75.8 kg (167 lb 3.2 oz)   SpO2 99%   BMI 27.32 kg/m      Exam:  General: awake and alert, appears to be in no acute distress  Neuro: normal speech  Skin: warm and dry, no visible rash  Heart: RRR  Lungs: clear bilaterally  Extremities:no LE edema      Results:  Last Comprehensive Metabolic Panel:  Sodium   Date Value Ref Range Status   08/11/2023 142 136 - 145 mmol/L Final   08/26/2020 141 133 - 144 mmol/L Final     Potassium   Date Value Ref Range Status   08/11/2023 4.6 3.4 - 5.3 mmol/L Final   11/21/2022 4.8 3.4 - 5.3 mmol/L Final   08/26/2020 4.4 3.4 - 5.3 mmol/L Final     Chloride   Date Value Ref Range Status   08/11/2023 105 98 - 107 mmol/L Final   11/21/2022 110 (H) 94 - 109 mmol/L Final   08/26/2020 111 (H) 94 - 109 mmol/L Final     Carbon Dioxide   Date Value Ref Range Status   08/26/2020 24 20 - 32 mmol/L Final     Carbon Dioxide (CO2)   Date Value Ref Range Status   08/11/2023 24 22 - 29 mmol/L Final   11/21/2022 24 20 - 32 mmol/L Final     Anion Gap   Date Value Ref Range Status   08/11/2023 13 7 - 15 mmol/L Final   11/21/2022 6 3 - 14 mmol/L Final   08/26/2020 8 3 - 14 mmol/L Final     Glucose   Date Value Ref Range Status   08/11/2023 82 70 - 99 mg/dL Final   11/21/2022 79 70 - 99 mg/dL Final   08/26/2020 84 70 - 99 mg/dL Final     Urea Nitrogen   Date Value Ref Range Status   08/11/2023 37.7 (H) 8.0 - 23.0 mg/dL Final   11/21/2022 35 (H) 7 - 30 mg/dL Final   08/26/2020 41 (H) 7 - 30 mg/dL  Final     Creatinine   Date Value Ref Range Status   08/11/2023 2.33 (H) 0.51 - 0.95 mg/dL Final   08/26/2020 1.70 (H) 0.52 - 1.04 mg/dL Final     GFR Estimate   Date Value Ref Range Status   08/11/2023 20 (L) >60 mL/min/1.73m2 Final   08/26/2020 27 (L) >60 mL/min/[1.73_m2] Final     Comment:     Non  GFR Calc  Starting 12/18/2018, serum creatinine based estimated GFR (eGFR) will be   calculated using the Chronic Kidney Disease Epidemiology Collaboration   (CKD-EPI) equation.       Calcium   Date Value Ref Range Status   08/11/2023 9.2 8.8 - 10.2 mg/dL Final   08/26/2020 9.0 8.5 - 10.1 mg/dL Final       Last Basic Metabolic Panel:  Lab Results   Component Value Date     11/08/2017      Lab Results   Component Value Date    POTASSIUM 3.5 11/08/2017     Lab Results   Component Value Date    CHLORIDE 104 11/08/2017     Lab Results   Component Value Date    ALEXANDRO 8.8 11/08/2017     Lab Results   Component Value Date    CO2 26 11/08/2017     Lab Results   Component Value Date    BUN 54 11/08/2017     Lab Results   Component Value Date    CR 2.36 11/08/2017     Lab Results   Component Value Date    GLC 88 11/08/2017       Dean Llanes Pa-c    Visit length 15 minutes. An additional 15 minutes were spent on date of service in chart review, documentation, and other acitivies as noted.       Again, thank you for allowing me to participate in the care of your patient.      Sincerely,    DEAN MAYER PA-C

## 2023-08-30 NOTE — TELEPHONE ENCOUNTER
omeprazole (PRILOSEC) 40 MG DR capsule 90 capsule 3 9/15/2022     Last Office Visit: 8/9/23  Future Office visit:   11/9/23      PPI Protocol [id number]/28/2023 12:52 PM   Protocol Details Not on Clopidogrel (unless Pantoprazole ordered)    No diagnosis of osteoporosis on record        Routing refill request to provider for review/approval because:  Diagnosis of osteomalacia and taking Plavix. Not taking Pantoprazole    Awa Dawn RN

## 2023-08-31 RX ORDER — OMEPRAZOLE 40 MG/1
40 CAPSULE, DELAYED RELEASE ORAL DAILY
Qty: 90 CAPSULE | Refills: 3 | Status: SHIPPED | OUTPATIENT
Start: 2023-08-31 | End: 2024-09-10

## 2023-09-01 ENCOUNTER — LAB (OUTPATIENT)
Dept: LAB | Facility: CLINIC | Age: 88
End: 2023-09-01
Payer: COMMERCIAL

## 2023-09-01 DIAGNOSIS — N18.4 CHRONIC KIDNEY DISEASE, STAGE IV (SEVERE) (H): ICD-10-CM

## 2023-09-01 DIAGNOSIS — I25.118 CORONARY ARTERY DISEASE OF NATIVE ARTERY OF NATIVE HEART WITH STABLE ANGINA PECTORIS (H): ICD-10-CM

## 2023-09-01 LAB
ALBUMIN SERPL BCG-MCNC: 4.3 G/DL (ref 3.5–5.2)
ANION GAP SERPL CALCULATED.3IONS-SCNC: 12 MMOL/L (ref 7–15)
BUN SERPL-MCNC: 40.6 MG/DL (ref 8–23)
CALCIUM SERPL-MCNC: 8.9 MG/DL (ref 8.8–10.2)
CHLORIDE SERPL-SCNC: 109 MMOL/L (ref 98–107)
CREAT SERPL-MCNC: 1.95 MG/DL (ref 0.51–0.95)
DEPRECATED HCO3 PLAS-SCNC: 20 MMOL/L (ref 22–29)
GFR SERPL CREATININE-BSD FRML MDRD: 24 ML/MIN/1.73M2
GLUCOSE SERPL-MCNC: 85 MG/DL (ref 70–99)
PHOSPHATE SERPL-MCNC: 3.8 MG/DL (ref 2.5–4.5)
POTASSIUM SERPL-SCNC: 4.5 MMOL/L (ref 3.4–5.3)
SODIUM SERPL-SCNC: 141 MMOL/L (ref 136–145)

## 2023-09-01 PROCEDURE — 80069 RENAL FUNCTION PANEL: CPT

## 2023-09-01 PROCEDURE — 36415 COLL VENOUS BLD VENIPUNCTURE: CPT

## 2023-09-05 ENCOUNTER — TELEPHONE (OUTPATIENT)
Dept: NEPHROLOGY | Facility: CLINIC | Age: 88
End: 2023-09-05
Payer: COMMERCIAL

## 2023-09-05 NOTE — TELEPHONE ENCOUNTER
Patient called to inquire on the status of current BP's. Left vm to folow up.  CAROL Graham Care Coordinator  Nephrology

## 2023-09-12 ENCOUNTER — TELEPHONE (OUTPATIENT)
Dept: NEPHROLOGY | Facility: CLINIC | Age: 88
End: 2023-09-12
Payer: COMMERCIAL

## 2023-09-12 NOTE — TELEPHONE ENCOUNTER
Spoke with patient regarding current BP's. Patient stated she wasn't home at the moment and requested to be called in an hour.  CAROL Graham Care Coordinator  Nephrology      Patient called again for current BP reading since stopping the Lasix 8/28. Per patient report he BP's are:  9/12 126/70  9/11 127/62  9/10 115/65  9/9 114/70  9/8 136/84  9/7 137/79  9/6 120/70  Patient states both of her legs are swollen today so she took a Lasix. Patient states she only has taken the Lasix when she get swelling in her legs. Patient states she has no other concerns. Routing to Mario Alberto Graham RN Care Coordinator  Nephrology

## 2023-09-14 ENCOUNTER — TELEPHONE (OUTPATIENT)
Dept: FAMILY MEDICINE | Facility: CLINIC | Age: 88
End: 2023-09-14
Payer: COMMERCIAL

## 2023-09-19 DIAGNOSIS — M10.00 IDIOPATHIC GOUT, UNSPECIFIED CHRONICITY, UNSPECIFIED SITE: ICD-10-CM

## 2023-09-22 RX ORDER — ALLOPURINOL 100 MG/1
100 TABLET ORAL DAILY
Qty: 90 TABLET | Refills: 0 | Status: SHIPPED | OUTPATIENT
Start: 2023-09-22 | End: 2023-12-27

## 2023-09-22 NOTE — TELEPHONE ENCOUNTER
allopurinol (ZYLOPRIM) 100 MG tablet 90 tablet 0 6/21/2023     Last Office Visit: 8/9/23  Future Office visit:  11/15/23     Gout Agents Protocol [id number]/19/2023 06:10 PM   Protocol Details Has Uric Acid on file in past 12 months and value is less than 6    Normal serum creatinine on file in the past 12 months       Routing refill request to provider for review/approval because:  Abnormal creatinine, no recent uric acid.     Awa Dawn RN

## 2023-09-25 DIAGNOSIS — M10.00 IDIOPATHIC GOUT, UNSPECIFIED CHRONICITY, UNSPECIFIED SITE: ICD-10-CM

## 2023-09-25 RX ORDER — ALLOPURINOL 100 MG/1
TABLET ORAL
Qty: 90 TABLET | Refills: 0 | OUTPATIENT
Start: 2023-09-25

## 2023-09-27 ENCOUNTER — PATIENT OUTREACH (OUTPATIENT)
Dept: NEPHROLOGY | Facility: CLINIC | Age: 88
End: 2023-09-27
Payer: COMMERCIAL

## 2023-09-27 DIAGNOSIS — N18.4 CKD (CHRONIC KIDNEY DISEASE) STAGE 4, GFR 15-29 ML/MIN (H): Primary | ICD-10-CM

## 2023-10-02 ENCOUNTER — OFFICE VISIT (OUTPATIENT)
Dept: NEPHROLOGY | Facility: CLINIC | Age: 88
End: 2023-10-02
Payer: COMMERCIAL

## 2023-10-02 VITALS
BODY MASS INDEX: 25.82 KG/M2 | SYSTOLIC BLOOD PRESSURE: 128 MMHG | WEIGHT: 158 LBS | HEART RATE: 78 BPM | DIASTOLIC BLOOD PRESSURE: 68 MMHG

## 2023-10-02 DIAGNOSIS — N18.32 ANEMIA DUE TO STAGE 3B CHRONIC KIDNEY DISEASE (H): Primary | ICD-10-CM

## 2023-10-02 DIAGNOSIS — D63.1 ANEMIA DUE TO STAGE 3B CHRONIC KIDNEY DISEASE (H): Primary | ICD-10-CM

## 2023-10-02 DIAGNOSIS — N18.4 CHRONIC KIDNEY DISEASE, STAGE IV (SEVERE) (H): ICD-10-CM

## 2023-10-02 DIAGNOSIS — I10 ESSENTIAL HYPERTENSION: ICD-10-CM

## 2023-10-02 PROCEDURE — 99214 OFFICE O/P EST MOD 30 MIN: CPT | Performed by: INTERNAL MEDICINE

## 2023-10-02 RX ORDER — FUROSEMIDE 40 MG
40 TABLET ORAL 2 TIMES DAILY
Qty: 180 TABLET | Refills: 3 | Status: SHIPPED | OUTPATIENT
Start: 2023-10-02 | End: 2024-02-19

## 2023-10-02 RX ORDER — FERROUS SULFATE 325(65) MG
325 TABLET ORAL EVERY OTHER DAY
Qty: 45 TABLET | Refills: 1 | Status: SHIPPED | OUTPATIENT
Start: 2023-10-02 | End: 2024-03-28

## 2023-10-02 NOTE — PATIENT INSTRUCTIONS
Double check dose of furosemide, it should be 20mg, take two  I called in 40mg tabs-     Labs in November,  Monitor weight and swelling

## 2023-10-02 NOTE — LETTER
10/2/2023       RE: Tessa Mansfield  [address]  [facility] [address]     Dear Colleague,    Thank you for referring your patient, Tessa Mansfield, to the [facility] at [facility]. Please see a copy of my visit note below.    Nephrology Progress Note     Assessment and Plan:   88 year old female with history of long standing HTN, CAD s/p multiple stents, who presents for followup of CKD stage 3, baseline SCr 1.8-2.2 mg/dL now with proteinuria. She had another angiogram with stent done June 2022.    1. CKD stage 3- baseline SCr 1.8-2.2mg/ dL, eGFR 25-29 ml/min. Mild/ minimal proteinuria now up to > 1gram, due to ? Uncontrolled BP  Her swelling was previously occasional. She's had more swelling since taking amlodipine 10 mg daily and has been taking furosemide 40 mg BID.   - scr 1.95 eGFR 24 ml/min September 2023  - will have her repeat labs in one month given we are increasing furosemide, ? Confusion about dose.  - Her proteinuria - may be due to BP not ideally controlled.  - BP 130s/70s at home. Was 128/68  - monitor labs, relatively stable at this time, except proteinuria  - consider ACE inhibitor , cannot see that she has been on this, given proteinuria and CKD-      2. Electrolytes/Acid Base status- normal.    Hypokalemia- takes 3 potassium 10 mEq pills daily      3. Hypertension/Volume status-  She is up a little and swelling is also worse. She is on hydralazine 30 mg TID, isosorbide mononitrate 40 mg TID, metoprolol 100 mg BID, furosemide 40 mg BID- was decreased to 'one pill daily' and she is prescribed 20mg tablets, and she may be doing only 20mg bid currently. After trialing daily , swelling worsened thus she went back to BID dosing    - on amlodipine 10mg she has more swelling- monitor, may need to lower to 5mg  - BP's ~130s/70s at home  -patient reports history of reaction/intolerance to Spironolactone, Chlorthalidone, Clonidine  and Terazosin in the past   - takes prn furosemide in the afternoon, and BP is close to goal.   - had new stent to RCA placed on 7/17/23 as elective procedure due to angina   - ECHO 7/17/23  Left ventricular function is normal.The ejection fraction is 55-60%. There is  mild hypokinesis of the basal and mid inferior segments.  The right ventricle is normal size. Global right ventricular function is  normal.  No significant valvular abnormalities.  The estimated PA systolic pressure is 32 mmHg.  IVC diameter <2.1 cm collapsing >50% with sniff suggests a normal RA pressure  of 3 mmHg.    4. Anemia- hgb at 8.9, up from low 7 range during admission 8/11/2023 iron sat 14%  -encouraged iron supplement every other day, recheck iron labs in one month along with hgb  - low platelet count- 90s, improved from 60 range.    5. BMD  - last serum calcium and phosphorus were normal  -secondary hyperparathyroidism-PTH was 133.     Assessment and plan was discussed with patient and she voiced her understanding and agreement.    - return in 3,6months    Reason for Visit:  Tessa Mansfield is an 88 year old female with HTN, who presents for CKD management.     HPI:  She is a pleasant female with PMMHx significant for stage III CKD presumed secondary to hypertensive nephrosclerosis.Her renal function has been  relatively stable, ranging from 1.8-2.2 mg/dL over the years. She has history of CAD s/p multiple stents  (8 stents in all) since 2004 and a pacemaker in 2007. She follows with cardiologist Dr Santoyo and Dr Zhu.    She had elected stent placed in RCA on 7/17 to help with angina.     She was admitted from 7/19 to 7/23 for melena secondary to bleeding colonic angiodysplastic lesion, acute on chronic anemia. S/p repair.   She has been taking furosemide twice a day for a while    She feels fatigued but has not been sleeping and was quite anemic.  She has chronic diarrhea, no n/v.     She has had a lot of issues with getting to  sleeping which got worse since her  had a fall and shattered his right arm.  She has had more swelling in recent 2 weeks or so, she was to decrease to 40mg daily of furosemide, but went back to twice a day but did not realize her tablets are 20mg tablets.  She will double check at home.    Weight is 158 lbs and stable, but likely has been taking 20mg only of furosemide  When she checks it at home it is in the 134 systolic range.    Home BP: 130 range    Baseline Cr: 1.8-2.2    ROS:  A comprehensive review of systems was obtained and negative, except as noted in the HPI or PMH.    Active Medical Problems:  Patient Active Problem List   Diagnosis    Degeneration of cervical intervertebral disc    Nonallopathic lesion of cervical region    Nonallopathic lesion of thoracic region    Coronary artery disease of native artery of native heart with stable angina pectoris (H24)    Dizziness and giddiness    Edema    Esophageal reflux    Gout    Essential hypertension    Obstructive sleep apnea    Osteomalacia    Post-menopausal osteoporosis    Cardiac Pacemaker- Medtronic, dual chamber- NOT dependant    Transient complete heart block (H)    Sinus node dysfunction (H)    Disturbance of skin sensation    NSTEMI (non-ST elevated myocardial infarction) (H)    Arrhythmia    Sick sinus syndrome (H)    Non-rheumatic mitral regurgitation    Non-rheumatic tricuspid valve insufficiency    CKD (chronic kidney disease) stage 4, GFR 15-29 ml/min (H)    Status post coronary angiogram    Secondary renal hyperparathyroidism (H24)    Thrombocytopenia (H24)    Renal hypertension    Acute on chronic diastolic (congestive) heart failure (H)    Chest pain, unspecified type    Long term (current) use of anticoagulants    Melena    General weakness    S/P coronary artery stent placement    Antiplatelet or antithrombotic long-term use    Anemia, unspecified type       Personal Hx:   Social History     Socioeconomic History    Marital status:  "     Spouse name: Not on file    Number of children: 4    Years of education: Not on file    Highest education level: Not on file   Occupational History     Employer: [employer]   Tobacco Use    Smoking status: Former     Packs/day: 0.30     Years: 15.00     Pack years: 4.50     Types: Cigarettes    Smokeless tobacco: Never    Tobacco comments:     Quit 22+ years ago   Substance and Sexual Activity    Alcohol use: Not on file    Drug use: No    Sexual activity: Not Currently     Partners: Male     Birth control/protection: Post-menopausal   Other Topics Concern     Service Not Asked    Blood Transfusions Yes    Caffeine Concern Not Asked    Occupational Exposure Not Asked    Hobby Hazards Not Asked    Sleep Concern Not Asked    Stress Concern Not Asked    Weight Concern Not Asked    Special Diet Not Asked    Back Care Not Asked    Exercise No    Bike Helmet Not Asked    Seat Belt Not Asked    Self-Exams Not Asked    Parent/sibling w/ CABG, MI or angioplasty before 65F 55M? Not Asked   Social History Narrative    Not on file     Social Determinants of Health     Financial Resource Strain: Not on file   Food Insecurity: Not on file   Transportation Needs: Not on file   Physical Activity: Not on file   Stress: Not on file   Social Connections: Not on file   Interpersonal Safety: Not on file   Housing Stability: Not on file       Allergies:  Allergies   Allergen Reactions    Bactrim [Sulfamethoxazole W/Trimethoprim] Other (See Comments)     Patient unable to recall    Biaxin [Clarithromycin]     Chlorthalidone Nausea and Vomiting    Clonidine     Codeine Sulfate Itching    Darvon [Propoxyphene Hcl]     Dilaudid [Hydromorphone] Visual Disturbance     Hallucinations      Gabapentin Other (See Comments) and Fatigue     \"felt drunk\"    Indomethacin     Levaquin [Levofloxacin Hemihydrate]     Morphine Sulfate     Percocet [Oxycodone-Acetaminophen] Other (See Comments)     hallucinations    " Pregabalin Fatigue    Pregabalin Itching     Other reaction(s): Fatigue    Shrimp Swelling    Spironolactone Other (See Comments)     Dehydrated      Terazosin     Ciprofloxacin Rash    Simvastatin Palpitations     Muscle weakness, leg cramping         Current Outpatient Medications   Medication    allopurinol (ZYLOPRIM) 100 MG tablet    amLODIPine (NORVASC) 10 MG tablet    apixaban ANTICOAGULANT (ELIQUIS) 2.5 MG tablet    budesonide (PULMICORT) 0.5 MG/2ML neb solution    clopidogrel (PLAVIX) 75 MG tablet    cyanocolbalamin (VITAMIN B-12) 1000 MCG tablet    fluticasone-salmeterol (ADVAIR) 250-50 MCG/ACT inhaler    furosemide (LASIX) 20 MG tablet    hydrALAZINE (APRESOLINE) 10 MG tablet    isosorbide mononitrate (ISMO/MONOKET) 20 MG tablet    metoprolol tartrate (LOPRESSOR) 100 MG tablet    Multiple Vitamins-Minerals (PRESERVISION AREDS 2+MULTI VIT PO)    omeprazole (PRILOSEC) 40 MG DR capsule    potassium chloride ER (K-TAB/KLOR-CON) 10 MEQ CR tablet    rosuvastatin (CRESTOR) 20 MG tablet    timolol maleate (TIMOPTIC) 0.5 % ophthalmic solution    VITAMIN D3 25 MCG (1000 UT) tablet     No current facility-administered medications for this visit.       Vitals:  /68   Pulse 78   Wt 71.7 kg (158 lb)   BMI 25.82 kg/m      Exam:  General: awake and alert, appears to be in no acute distress  Neuro: normal speech  Skin: warm and dry, no visible rash  Heart: RRR  Lungs: clear bilaterally  Extremities:no LE edema      Results:  Last Comprehensive Metabolic Panel:  Sodium   Date Value Ref Range Status   09/01/2023 141 136 - 145 mmol/L Final   08/26/2020 141 133 - 144 mmol/L Final     Potassium   Date Value Ref Range Status   09/01/2023 4.5 3.4 - 5.3 mmol/L Final   11/21/2022 4.8 3.4 - 5.3 mmol/L Final   08/26/2020 4.4 3.4 - 5.3 mmol/L Final     Chloride   Date Value Ref Range Status   09/01/2023 109 (H) 98 - 107 mmol/L Final   11/21/2022 110 (H) 94 - 109 mmol/L Final   08/26/2020 111 (H) 94 - 109 mmol/L Final      Carbon Dioxide   Date Value Ref Range Status   08/26/2020 24 20 - 32 mmol/L Final     Carbon Dioxide (CO2)   Date Value Ref Range Status   09/01/2023 20 (L) 22 - 29 mmol/L Final   11/21/2022 24 20 - 32 mmol/L Final     Anion Gap   Date Value Ref Range Status   09/01/2023 12 7 - 15 mmol/L Final   11/21/2022 6 3 - 14 mmol/L Final   08/26/2020 8 3 - 14 mmol/L Final     Glucose   Date Value Ref Range Status   09/01/2023 85 70 - 99 mg/dL Final   11/21/2022 79 70 - 99 mg/dL Final   08/26/2020 84 70 - 99 mg/dL Final     Urea Nitrogen   Date Value Ref Range Status   09/01/2023 40.6 (H) 8.0 - 23.0 mg/dL Final   11/21/2022 35 (H) 7 - 30 mg/dL Final   08/26/2020 41 (H) 7 - 30 mg/dL Final     Creatinine   Date Value Ref Range Status   09/01/2023 1.95 (H) 0.51 - 0.95 mg/dL Final   08/26/2020 1.70 (H) 0.52 - 1.04 mg/dL Final     GFR Estimate   Date Value Ref Range Status   09/01/2023 24 (L) >60 mL/min/1.73m2 Final   08/26/2020 27 (L) >60 mL/min/[1.73_m2] Final     Comment:     Non  GFR Calc  Starting 12/18/2018, serum creatinine based estimated GFR (eGFR) will be   calculated using the Chronic Kidney Disease Epidemiology Collaboration   (CKD-EPI) equation.       Calcium   Date Value Ref Range Status   09/01/2023 8.9 8.8 - 10.2 mg/dL Final   08/26/2020 9.0 8.5 - 10.1 mg/dL Final       Last Basic Metabolic Panel:  Lab Results   Component Value Date     11/08/2017      Lab Results   Component Value Date    POTASSIUM 3.5 11/08/2017     Lab Results   Component Value Date    CHLORIDE 104 11/08/2017     Lab Results   Component Value Date    ALEXANDRO 8.8 11/08/2017     Lab Results   Component Value Date    CO2 26 11/08/2017     Lab Results   Component Value Date    BUN 54 11/08/2017     Lab Results   Component Value Date    CR 2.36 11/08/2017     Lab Results   Component Value Date    GLC 88 11/08/2017       Laura Llanes Pa-c    Visit length 15 minutes. An additional 15 minutes were spent on date of service in  chart review, documentation, and other acitivies as noted.           Again, thank you for allowing me to participate in the care of your patient.      Sincerely,    Marzena Martin MD

## 2023-10-02 NOTE — PROGRESS NOTES
Nephrology Progress Note     Assessment and Plan:   88 year old female with history of long standing HTN, CAD s/p multiple stents, who presents for followup of CKD stage 3, baseline SCr 1.8-2.2 mg/dL now with proteinuria. She had another angiogram with stent done June 2022.    1. CKD stage 3- baseline SCr 1.8-2.2mg/ dL, eGFR 25-29 ml/min. Mild/ minimal proteinuria now up to > 1gram, due to ? Uncontrolled BP  Her swelling was previously occasional. She's had more swelling since taking amlodipine 10 mg daily and has been taking furosemide 40 mg BID.   - scr 1.95 eGFR 24 ml/min September 2023  - will have her repeat labs in one month given we are increasing furosemide, ? Confusion about dose.  - Her proteinuria - may be due to BP not ideally controlled.  - BP 130s/70s at home. Was 128/68  - monitor labs, relatively stable at this time, except proteinuria  - consider ACE inhibitor , cannot see that she has been on this, given proteinuria and CKD-      2. Electrolytes/Acid Base status- normal.    Hypokalemia- takes 3 potassium 10 mEq pills daily      3. Hypertension/Volume status-  She is up a little and swelling is also worse. She is on hydralazine 30 mg TID, isosorbide mononitrate 40 mg TID, metoprolol 100 mg BID, furosemide 40 mg BID- was decreased to 'one pill daily' and she is prescribed 20mg tablets, and she may be doing only 20mg bid currently. After trialing daily , swelling worsened thus she went back to BID dosing    - on amlodipine 10mg she has more swelling- monitor, may need to lower to 5mg  - BP's ~130s/70s at home  -patient reports history of reaction/intolerance to Spironolactone, Chlorthalidone, Clonidine and Terazosin in the past   - takes prn furosemide in the afternoon, and BP is close to goal.   - had new stent to RCA placed on 7/17/23 as elective procedure due to angina   - ECHO 7/17/23  Left ventricular function is normal.The ejection fraction is 55-60%. There is  mild hypokinesis of the basal and  mid inferior segments.  The right ventricle is normal size. Global right ventricular function is  normal.  No significant valvular abnormalities.  The estimated PA systolic pressure is 32 mmHg.  IVC diameter <2.1 cm collapsing >50% with sniff suggests a normal RA pressure  of 3 mmHg.    4. Anemia- hgb at 8.9, up from low 7 range during admission 8/11/2023 iron sat 14%  -encouraged iron supplement every other day, recheck iron labs in one month along with hgb  - low platelet count- 90s, improved from 60 range.    5. BMD  - last serum calcium and phosphorus were normal  -secondary hyperparathyroidism-PTH was 133.     Assessment and plan was discussed with patient and she voiced her understanding and agreement.    - return in 3,6months    Reason for Visit:  Tessa Mansfield is an 88 year old female with HTN, who presents for CKD management.     HPI:  She is a pleasant female with PMMHx significant for stage III CKD presumed secondary to hypertensive nephrosclerosis.Her renal function has been  relatively stable, ranging from 1.8-2.2 mg/dL over the years. She has history of CAD s/p multiple stents  (8 stents in all) since 2004 and a pacemaker in 2007. She follows with cardiologist Dr Santoyo and Dr Zhu.    She had elected stent placed in RCA on 7/17 to help with angina.     She was admitted from 7/19 to 7/23 for melena secondary to bleeding colonic angiodysplastic lesion, acute on chronic anemia. S/p repair.   She has been taking furosemide twice a day for a while    She feels fatigued but has not been sleeping and was quite anemic.  She has chronic diarrhea, no n/v.     She has had a lot of issues with getting to sleeping which got worse since her  had a fall and shattered his right arm.  She has had more swelling in recent 2 weeks or so, she was to decrease to 40mg daily of furosemide, but went back to twice a day but did not realize her tablets are 20mg tablets.  She will double check at home.    Weight  is 158 lbs and stable, but likely has been taking 20mg only of furosemide  When she checks it at home it is in the 134 systolic range.    Home BP: 130 range    Baseline Cr: 1.8-2.2    ROS:  A comprehensive review of systems was obtained and negative, except as noted in the HPI or PMH.    Active Medical Problems:  Patient Active Problem List   Diagnosis    Degeneration of cervical intervertebral disc    Nonallopathic lesion of cervical region    Nonallopathic lesion of thoracic region    Coronary artery disease of native artery of native heart with stable angina pectoris (H24)    Dizziness and giddiness    Edema    Esophageal reflux    Gout    Essential hypertension    Obstructive sleep apnea    Osteomalacia    Post-menopausal osteoporosis    Cardiac Pacemaker- Medtronic, dual chamber- NOT dependant    Transient complete heart block (H)    Sinus node dysfunction (H)    Disturbance of skin sensation    NSTEMI (non-ST elevated myocardial infarction) (H)    Arrhythmia    Sick sinus syndrome (H)    Non-rheumatic mitral regurgitation    Non-rheumatic tricuspid valve insufficiency    CKD (chronic kidney disease) stage 4, GFR 15-29 ml/min (H)    Status post coronary angiogram    Secondary renal hyperparathyroidism (H24)    Thrombocytopenia (H24)    Renal hypertension    Acute on chronic diastolic (congestive) heart failure (H)    Chest pain, unspecified type    Long term (current) use of anticoagulants    Melena    General weakness    S/P coronary artery stent placement    Antiplatelet or antithrombotic long-term use    Anemia, unspecified type       Personal Hx:   Social History     Socioeconomic History    Marital status:      Spouse name: Not on file    Number of children: 4    Years of education: Not on file    Highest education level: Not on file   Occupational History     Employer: [employer]   Tobacco Use    Smoking status: Former     Packs/day: 0.30     Years: 15.00     Pack years: 4.50      "Types: Cigarettes    Smokeless tobacco: Never    Tobacco comments:     Quit 22+ years ago   Substance and Sexual Activity    Alcohol use: Not on file    Drug use: No    Sexual activity: Not Currently     Partners: Male     Birth control/protection: Post-menopausal   Other Topics Concern     Service Not Asked    Blood Transfusions Yes    Caffeine Concern Not Asked    Occupational Exposure Not Asked    Hobby Hazards Not Asked    Sleep Concern Not Asked    Stress Concern Not Asked    Weight Concern Not Asked    Special Diet Not Asked    Back Care Not Asked    Exercise No    Bike Helmet Not Asked    Seat Belt Not Asked    Self-Exams Not Asked    Parent/sibling w/ CABG, MI or angioplasty before 65F 55M? Not Asked   Social History Narrative    Not on file     Social Determinants of Health     Financial Resource Strain: Not on file   Food Insecurity: Not on file   Transportation Needs: Not on file   Physical Activity: Not on file   Stress: Not on file   Social Connections: Not on file   Interpersonal Safety: Not on file   Housing Stability: Not on file       Allergies:  Allergies   Allergen Reactions    Bactrim [Sulfamethoxazole W/Trimethoprim] Other (See Comments)     Patient unable to recall    Biaxin [Clarithromycin]     Chlorthalidone Nausea and Vomiting    Clonidine     Codeine Sulfate Itching    Darvon [Propoxyphene Hcl]     Dilaudid [Hydromorphone] Visual Disturbance     Hallucinations      Gabapentin Other (See Comments) and Fatigue     \"felt drunk\"    Indomethacin     Levaquin [Levofloxacin Hemihydrate]     Morphine Sulfate     Percocet [Oxycodone-Acetaminophen] Other (See Comments)     hallucinations    Pregabalin Fatigue    Pregabalin Itching     Other reaction(s): Fatigue    Shrimp Swelling    Spironolactone Other (See Comments)     Dehydrated      Terazosin     Ciprofloxacin Rash    Simvastatin Palpitations     Muscle weakness, leg cramping         Current Outpatient Medications   Medication    " allopurinol (ZYLOPRIM) 100 MG tablet    amLODIPine (NORVASC) 10 MG tablet    apixaban ANTICOAGULANT (ELIQUIS) 2.5 MG tablet    budesonide (PULMICORT) 0.5 MG/2ML neb solution    clopidogrel (PLAVIX) 75 MG tablet    cyanocolbalamin (VITAMIN B-12) 1000 MCG tablet    fluticasone-salmeterol (ADVAIR) 250-50 MCG/ACT inhaler    furosemide (LASIX) 20 MG tablet    hydrALAZINE (APRESOLINE) 10 MG tablet    isosorbide mononitrate (ISMO/MONOKET) 20 MG tablet    metoprolol tartrate (LOPRESSOR) 100 MG tablet    Multiple Vitamins-Minerals (PRESERVISION AREDS 2+MULTI VIT PO)    omeprazole (PRILOSEC) 40 MG DR capsule    potassium chloride ER (K-TAB/KLOR-CON) 10 MEQ CR tablet    rosuvastatin (CRESTOR) 20 MG tablet    timolol maleate (TIMOPTIC) 0.5 % ophthalmic solution    VITAMIN D3 25 MCG (1000 UT) tablet     No current facility-administered medications for this visit.       Vitals:  /68   Pulse 78   Wt 71.7 kg (158 lb)   BMI 25.82 kg/m      Exam:  General: awake and alert, appears to be in no acute distress  Neuro: normal speech  Skin: warm and dry, no visible rash  Heart: RRR  Lungs: clear bilaterally  Extremities:no LE edema      Results:  Last Comprehensive Metabolic Panel:  Sodium   Date Value Ref Range Status   09/01/2023 141 136 - 145 mmol/L Final   08/26/2020 141 133 - 144 mmol/L Final     Potassium   Date Value Ref Range Status   09/01/2023 4.5 3.4 - 5.3 mmol/L Final   11/21/2022 4.8 3.4 - 5.3 mmol/L Final   08/26/2020 4.4 3.4 - 5.3 mmol/L Final     Chloride   Date Value Ref Range Status   09/01/2023 109 (H) 98 - 107 mmol/L Final   11/21/2022 110 (H) 94 - 109 mmol/L Final   08/26/2020 111 (H) 94 - 109 mmol/L Final     Carbon Dioxide   Date Value Ref Range Status   08/26/2020 24 20 - 32 mmol/L Final     Carbon Dioxide (CO2)   Date Value Ref Range Status   09/01/2023 20 (L) 22 - 29 mmol/L Final   11/21/2022 24 20 - 32 mmol/L Final     Anion Gap   Date Value Ref Range Status   09/01/2023 12 7 - 15 mmol/L Final    11/21/2022 6 3 - 14 mmol/L Final   08/26/2020 8 3 - 14 mmol/L Final     Glucose   Date Value Ref Range Status   09/01/2023 85 70 - 99 mg/dL Final   11/21/2022 79 70 - 99 mg/dL Final   08/26/2020 84 70 - 99 mg/dL Final     Urea Nitrogen   Date Value Ref Range Status   09/01/2023 40.6 (H) 8.0 - 23.0 mg/dL Final   11/21/2022 35 (H) 7 - 30 mg/dL Final   08/26/2020 41 (H) 7 - 30 mg/dL Final     Creatinine   Date Value Ref Range Status   09/01/2023 1.95 (H) 0.51 - 0.95 mg/dL Final   08/26/2020 1.70 (H) 0.52 - 1.04 mg/dL Final     GFR Estimate   Date Value Ref Range Status   09/01/2023 24 (L) >60 mL/min/1.73m2 Final   08/26/2020 27 (L) >60 mL/min/[1.73_m2] Final     Comment:     Non  GFR Calc  Starting 12/18/2018, serum creatinine based estimated GFR (eGFR) will be   calculated using the Chronic Kidney Disease Epidemiology Collaboration   (CKD-EPI) equation.       Calcium   Date Value Ref Range Status   09/01/2023 8.9 8.8 - 10.2 mg/dL Final   08/26/2020 9.0 8.5 - 10.1 mg/dL Final       Last Basic Metabolic Panel:  Lab Results   Component Value Date     11/08/2017      Lab Results   Component Value Date    POTASSIUM 3.5 11/08/2017     Lab Results   Component Value Date    CHLORIDE 104 11/08/2017     Lab Results   Component Value Date    ALEXANDRO 8.8 11/08/2017     Lab Results   Component Value Date    CO2 26 11/08/2017     Lab Results   Component Value Date    BUN 54 11/08/2017     Lab Results   Component Value Date    CR 2.36 11/08/2017     Lab Results   Component Value Date    GLC 88 11/08/2017       Laura Llanes Pa-c    Visit length 15 minutes. An additional 15 minutes were spent on date of service in chart review, documentation, and other acitivies as noted.

## 2023-10-03 NOTE — TELEPHONE ENCOUNTER
"Patient called to offer kidney education. Patient stated at this time she is declining as she has too many other things going on. Patient asked to clarify her Lasix dose from visit with Dr Martin yesterday. Per Dr Martin's note, patient to take 40 mg Lasix BID. Patient stated an understanding as that it what she has been taking for \"a long time\".   CAROL Graham Care Coordinator  Nephrology    "

## 2023-10-03 NOTE — PROGRESS NOTES
Nephrology Note: RN CC Chart Review    REASON FOR ENCOUNTER:     Chart reviewed by nephrology RN CC                          SITUATION/BACKROUND:     Last nephrology visit:    Last Renal Panel:  Sodium   Date Value Ref Range Status   09/01/2023 141 136 - 145 mmol/L Final   08/26/2020 141 133 - 144 mmol/L Final     Potassium   Date Value Ref Range Status   09/01/2023 4.5 3.4 - 5.3 mmol/L Final   11/21/2022 4.8 3.4 - 5.3 mmol/L Final   08/26/2020 4.4 3.4 - 5.3 mmol/L Final     Chloride   Date Value Ref Range Status   09/01/2023 109 (H) 98 - 107 mmol/L Final   11/21/2022 110 (H) 94 - 109 mmol/L Final   08/26/2020 111 (H) 94 - 109 mmol/L Final     Carbon Dioxide   Date Value Ref Range Status   08/26/2020 24 20 - 32 mmol/L Final     Carbon Dioxide (CO2)   Date Value Ref Range Status   09/01/2023 20 (L) 22 - 29 mmol/L Final   11/21/2022 24 20 - 32 mmol/L Final     Anion Gap   Date Value Ref Range Status   09/01/2023 12 7 - 15 mmol/L Final   11/21/2022 6 3 - 14 mmol/L Final   08/26/2020 8 3 - 14 mmol/L Final     Glucose   Date Value Ref Range Status   09/01/2023 85 70 - 99 mg/dL Final   11/21/2022 79 70 - 99 mg/dL Final   08/26/2020 84 70 - 99 mg/dL Final     Urea Nitrogen   Date Value Ref Range Status   09/01/2023 40.6 (H) 8.0 - 23.0 mg/dL Final   11/21/2022 35 (H) 7 - 30 mg/dL Final   08/26/2020 41 (H) 7 - 30 mg/dL Final     Creatinine   Date Value Ref Range Status   09/01/2023 1.95 (H) 0.51 - 0.95 mg/dL Final   08/26/2020 1.70 (H) 0.52 - 1.04 mg/dL Final     GFR Estimate   Date Value Ref Range Status   09/01/2023 24 (L) >60 mL/min/1.73m2 Final   08/26/2020 27 (L) >60 mL/min/[1.73_m2] Final     Comment:     Non  GFR Calc  Starting 12/18/2018, serum creatinine based estimated GFR (eGFR) will be   calculated using the Chronic Kidney Disease Epidemiology Collaboration   (CKD-EPI) equation.       Calcium   Date Value Ref Range Status   09/01/2023 8.9 8.8 - 10.2 mg/dL Final   08/26/2020 9.0 8.5 - 10.1 mg/dL  Final     Phosphorus   Date Value Ref Range Status   09/01/2023 3.8 2.5 - 4.5 mg/dL Final   08/26/2020 3.6 2.5 - 4.5 mg/dL Final     Albumin   Date Value Ref Range Status   09/01/2023 4.3 3.5 - 5.2 g/dL Final   11/21/2022 3.8 3.4 - 5.0 g/dL Final   08/26/2020 3.6 3.4 - 5.0 g/dL Final         Neph Tracking Status:  Neph Tracking Flowsheet Last Filled Values       None              ASSESSMENT/PLAN     Follow up call in 1-2 weeks  Patient added to the CKD Journey.    Upcoming Appointments:  Appointments in Next Year      Oct 09, 2023 10:30 AM  (Arrive by 10:15 AM)  CARDIAC DEVICE CHECK - IN CLINIC with  CV DEVICE 1  [facility] ([facility] ) [phone]     Oct 09, 2023 11:00 AM  ECHO COMPLETE with UCECHCR2  [facility] ([facility] ) [phone]     Oct 09, 2023 12:30 PM  (Arrive by 12:15 PM)  Return Cardiology with Randa Ann NP  [facility] ([facility] ) [phone]     Nov 15, 2023 12:30 PM  (Arrive by 12:15 PM)  Return Visit with Pedro Gomez MD  [facility] [place] ([facility] ) [phone]     Nghia 10, 2024 12:00 AM  CARDIAC DEVICE CHECK - REMOTE with UC ICD REMOTE  [facility] ([facility] ) [phone]     Feb 16, 2024 12:00 PM  (Arrive by 11:45 AM)  CT CHEST W/O CONTRAST with UCSCCT1  [facility] ) [phone]     Feb 19, 2024 12:30 PM  (Arrive by 12:15 PM)  Return Nephrology with Marzena Martin MD  [facility] ([facility] ) [phone]     Feb 20, 2024  2:30 PM  (Arrive by 2:15 PM)  Return Patient with Jorge Singh MD  [facility]  [facility] ([facility] ) [phone]              CINTHIA MUNGUIA RN

## 2023-10-05 DIAGNOSIS — Z95.5 S/P CORONARY ARTERY STENT PLACEMENT: ICD-10-CM

## 2023-10-05 RX ORDER — CLOPIDOGREL BISULFATE 75 MG/1
75 TABLET ORAL DAILY
Qty: 90 TABLET | Refills: 1 | Status: SHIPPED | OUTPATIENT
Start: 2023-10-05 | End: 2024-07-04

## 2023-10-05 NOTE — TELEPHONE ENCOUNTER
clopidogrel (PLAVIX) 75 MG tablet          Sig: Take 1 tablet (75 mg) by mouth daily     Last Written Prescription Date:  7/28/23  Last Fill Quantity: 90,   # refills: 0  Last Office Visit : 8/9/23  Future Office visit:  11/15/23    Routing refill request to provider for review/approval because:  Review last Hgb, PLT, GI bleed July 2023. On Protonix.    Hemoglobin   Date Value Ref Range Status   08/11/2023 8.9 (L) 11.7 - 15.7 g/dL Final   08/26/2020 11.2 (L) 11.7 - 15.7 g/dL Final   ]   Platelet Count   Date Value Ref Range Status   08/11/2023 91 (L) 150 - 450 10e3/uL Final   08/26/2020 113 (L) 150 - 450 10e9/L Final

## 2023-10-08 NOTE — PROGRESS NOTES
Chief complaint: 4 month f/up CAD  HPI:   Tessa Mansfield is a 88 year old female patient of Dr. Santoyo with extensive CAD history as detailed below (PCI to all 3 vessels at various times from 3170-2590, last PCA to RPDA 7/23/13 and known 80% ISR of small LCX) on indefinite dual antiplatelet therapy and sick sinus syndrome s/p dual-chamber PPM, device detected atrial fibrillation now on AC, CKD, last evaluated 7/2023 at which time she reported increase SOB, underwent coronary angiogram which showed ISR of dRCA s/p PCI 7/17/23.    12/10/19:  She underwent pacemaker generator change 10/15/19 due to her prior generator reaching LOLA (new device: [employer] [id number] [place] XT DR MRI dual chamber PPM) without event.      She reports volume status has been about stable and that she has self-titrated furosemide as needed and has done so for some time.      5/11/21:  Since her last visit, Tessa reports feeling generally well. She does have occasional episodes of angina (pain between her shoulders) for which she takes 1 NTG every 2-3 months, which has been longstanding and stable.      She denies any dyspnea at rest or with exertion, PND, orthopnea, peripheral edema, palpitations, lightheadedness or syncope.      5/17/2022:  Patient states that she has had SOB for the last month. States that she is able to walk 1-2 blocks before developing SOB, prior to this she was able to walk about 4 blocks before becoming SOB.  She denies chest pain, chest pressure, lightheadedness or dizziness.     12/06/22:  Since her last visit, she underwent coronary angiogram and PCI+ALVAREZ to RCA with dramatic improvement in her exertional dyspnea. She completed cardiac rehab. She does think she could walk somewhat further than before but has not pushed her exercise capacity recently. She plans to start this in in the new year. She has not required NTG at all since her last visit.     She denies any dyspnea at rest or with exertion, PND, orthopnea,  peripheral edema, palpitations, lightheadedness or syncope.     June 2023:  Had COVID a few weeks ago with symptoms of fatigue and short of breath. Device interrogation showed > 9 hours of atrial fibrillation on 5/29/23 in the setting of COVID 19 infection. She was not aware that she was in atrial fibrillation - no palpitations, lightheadedness, syncope. Continues to feel fatigued post COVID, though denies significant shortness of breath reminiscent of her prior angina. No chest pain or pressure. No orthopnea, PND. She has chronic leg swelling which improves with lasix and elevation. BLE edema has been stable. Has gained 5 lbs over the past few months. BP is usually high in clinic - home BP slightly above goal 134/60-140s. Developed worsening renal function on higher dose of lasix, ultimately underwent coronary angiogram with PCI of dRCA 7/18/23. She was readmitted 7/19 with melena - ASA and Brilinta stopped and plavix started with Eliquis 2.5 mg BID.    October 9th 2023:  Patient reports maybe slight improvement in breathing since her stent placement, but remains short of breath with exertion such as housework or going for a walk. She can walk or work around the house for about 15 minutes before having to stop and rest. Her breathing hasn't really gotten any worse, just hasn't gotten better. She denies any chest pain or pressure but over the past 2 weeks she has been experiencing an ache in her upper back behind her heart, worse with activity, improves with rest. She has had upper back pain before with prior angina, but usually radiates throughout the whole back. This is more localized. She denies any orthopnea, PND, weight gain. Leg swelling was worse, now better after increasing lasix to 40 mg BID about a week ago. She has not lightheadedness, palpitations or syncope. She has diarrhea, no bloody, dark, tarry looking stools. Home -137/50s-60s.     Summary of CAD history:  10/27/2002: AMI s/p PCI+BMS  (3.5x18mm Bx velocity) to mRCA  2/5/2003: Back pain; PCI+stent to mLCX c/b distal embolization/slow flow  4/11/2003: Unstable angina; PCI+stent (Hepacoat velocity) to mLAD  11/27/2007: PCI+DESx2 to pLAD (IVUS w/ calcification of LMCA and ulcerated plaque pLAD, 80% dRCA; also had 80% dLCX, too small to stent)  12/11/2007: Staged PCI. PCI+DESx1 (3.5x13mm Cypher) to dRCA (indefinite DAPT recommended at this time)  6/27/2008: Angina. mLCX stent 70% ISR. LAD and RCA stents patent. Myocardium at risk from LCX felt to be small, medical management preferred to PCI.  11/10/2009: Angina. Findings unchanged from 6/27/2008, FFR LAD 0.90. Medical management recommended.  7/23/2013: Unstable angina; PCI+ALVAREZ to mPDA. Diagnostic findings: LMCA 40% distal. LAD: pLAD stents patent mLAD 30% ISR dLAD 50% diffuse, D2 diffuse disease. LCX 80% mid ISR. RCA diffuse <30% mid, RPLAs diffuse disease, RPDA 100% occlusion.   5/27/2022: 90% dRCA in-stent -> PCI+DESx1 to dRCA.  50-60% pRCA (return for hemodynamic assessment if symptoms persist/recur) 50-60% small LCx (medical management recommended)   7/18/23: 80% ISR of dRCA --> PCI w/ALVAREZ x2, RPDA-2 lesion is 50% stenosed, RPDA-1 lesion is 65% stenosed, Ost LAD to Prox LAD lesion is 50% stenosed, Prox LAD to Mid LAD lesion is 40% stenosed, mLAD 70% stenosed, prox Cx to Mid Cx lesion is 55% stenosed small vessel.    PAST MEDICAL HISTORY:  Past Medical History:   Diagnosis Date    CAD (coronary artery disease)     CAD s/p PCI+BMS to MRCA 10/2002, PCI to mLCX 2/2003, PCI+DESx2 to pLAD 11/2007, PCI+DESx1 to dRCA 12/2007, PCI+ALVAREZ to RPDA 7/2013; on indefinite DAPT  10/27/2002    10/27/2002: AMI s/p PCI+BMS (3.5x18mm Bx velocity) to mRCA 2/5/2003: Back pain; PCI+stent to mLCX c/b distal embolization/slow flow 4/11/2003: Unstable angina; PCI+stent (Hepacoat velocity) to mLAD 11/27/2007: PCI+DESx2 to pLAD (IVUS w/ calcification of LMCA and ulcerated plaque pLAD, 80% dRCA; also had 80% dLCX, too small to  stent) 12/11/2007: Staged PCI. PCI+DESx1 (3.5x13mm Cypher) to dRCA (indefinite DAPT recommended at this time) 6/27/2008: Angina. mLCX stent 70% ISR. LAD and RCA stents patent. Myocardium at risk from LCX felt to be small, medical management preferred to PCI. 11/10/2009: Angina. Findings unchanged from 6/27/2008, FFR LAD 0.90. Medical management recommended. 7/23/2013: Unstable angina; PCI+ALVAREZ to mPDA. Diagnostic findings: LMCA 40% distal. LAD: pLAD stents patent mLAD 30% ISR dLAD 50% diffuse, D2 diffuse disease. LCX 80% mid ISR. RCA diffuse <30% mid, RPLAs diffuse disease, RPDA 100% occlusion.      Cardiac Pacemaker- [employer], dual chamber- NOT dependant 11/28/2007    CKD (chronic kidney disease), stage IV (H)     Hyperlipidemia LDL goal <70     Hypertension     Stented coronary artery 10-    RCA    Stented coronary artery 2-5-2003    LCx    Stented coronary artery 4-    LAD    Stented coronary artery 11-    LAD    Stented coronary artery 12-    RCA    Transient complete heart block (H) 11/28/2007   New device detected atrial fibrillation > 9 hours 5/29/23    CURRENT MEDICATIONS:  Current Outpatient Medications   Medication    allopurinol (ZYLOPRIM) 100 MG tablet    amLODIPine (NORVASC) 10 MG tablet    apixaban ANTICOAGULANT (ELIQUIS) 2.5 MG tablet    budesonide (PULMICORT) 0.5 MG/2ML neb solution    clopidogrel (PLAVIX) 75 MG tablet    cyanocolbalamin (VITAMIN B-12) 1000 MCG tablet    ferrous sulfate (FEROSUL) 325 (65 Fe) MG tablet    fluticasone-salmeterol (ADVAIR) 250-50 MCG/ACT inhaler    furosemide (LASIX) 40 MG tablet    hydrALAZINE (APRESOLINE) 10 MG tablet    isosorbide mononitrate (ISMO/MONOKET) 20 MG tablet    metoprolol tartrate (LOPRESSOR) 100 MG tablet    Multiple Vitamins-Minerals (PRESERVISION AREDS 2+MULTI VIT PO)    omeprazole (PRILOSEC) 40 MG DR capsule    potassium chloride ER (K-TAB/KLOR-CON) 10 MEQ CR tablet    rosuvastatin (CRESTOR) 20 MG tablet    timolol maleate  "(TIMOPTIC) 0.5 % ophthalmic solution    VITAMIN D3 25 MCG (1000 UT) tablet     No current facility-administered medications for this visit.     ALLERGIES:     Allergies   Allergen Reactions    Bactrim [Sulfamethoxazole W/Trimethoprim] Other (See Comments)     Patient unable to recall    Biaxin [Clarithromycin]     Chlorthalidone Nausea and Vomiting    Clonidine     Codeine Sulfate Itching    Darvon [Propoxyphene Hcl]     Dilaudid [Hydromorphone] Visual Disturbance     Hallucinations      Gabapentin Other (See Comments) and Fatigue     \"felt drunk\"    Indomethacin     Levaquin [Levofloxacin Hemihydrate]     Morphine Sulfate     Percocet [Oxycodone-Acetaminophen] Other (See Comments)     hallucinations    Pregabalin Fatigue    Pregabalin Itching     Other reaction(s): Fatigue    Shrimp Swelling    Spironolactone Other (See Comments)     Dehydrated      Terazosin     Ciprofloxacin Rash    Simvastatin Palpitations     Muscle weakness, leg cramping         FAMILY HISTORY:  Family History   Problem Relation Age of Onset    Cancer Sister 82        bladder cancer       SOCIAL HISTORY:  Social History     Tobacco Use    Smoking status: Former     Packs/day: 0.30     Years: 15.00     Pack years: 4.50     Types: Cigarettes    Smokeless tobacco: Never    Tobacco comments:     Quit 22+ years ago   Substance Use Topics    Drug use: No       ROS:   A comprehensive 14 point review of systems is negative other than as mentioned in HPI.    Exam:  BP (!) 149/70 (BP Location: Right arm, Patient Position: Sitting, Cuff Size: Adult Regular)   Pulse 71   Ht 1.687 m (5' 6.42\")   Wt 72 kg (158 lb 12.8 oz)   SpO2 98%   BMI 25.31 kg/m    GENERAL APPEARANCE: healthy, alert and no distress  EYES: no icterus, no xanthelasmas  ENT: normal palate, mucosa moist, no central cyanosis  NECK: No JVD   RESPIRATORY: lungs clear to auscultation - no rales, rhonchi or wheezes, no use of accessory muscles, no retractions, respirations are unlabored, " normal respiratory rate  CARDIOVASCULAR: regular rhythm, II/VI systolic murmur LLSB and apex  GI: soft, non tender, bowel sounds normal,no abdominal bruits  EXTREMITIES: no edema, no bruits  NEURO: alert and oriented to person/place/time, normal speech, gait and affect  VASC: Radial, dorsalis pedis and posterior tibialis pulses 2+ bilaterally.  SKIN: no ecchymoses, no rashes.  PSYCH: cooperative, affect appropriate.     Labs:  Last Comprehensive Metabolic Panel:  Lab Results   Component Value Date     09/01/2023    POTASSIUM 4.5 09/01/2023    CHLORIDE 109 (H) 09/01/2023    CO2 20 (L) 09/01/2023    ANIONGAP 12 09/01/2023    GLC 85 09/01/2023    BUN 40.6 (H) 09/01/2023    CR 1.95 (H) 09/01/2023    GFRESTIMATED 24 (L) 09/01/2023    ALEXANDRO 8.9 09/01/2023       CBC RESULTS: Recent Labs   Lab Test 04/25/23  0850 11/21/22  1336   WBC  --  7.2   RBC  --  3.75*   HGB 11.0* 11.0*  11.0*   HCT  --  36.7   MCV  --  98   MCH  --  29.3   MCHC  --  30.6*   RDW  --  14.8   PLT  --  125*     Recent Labs   Lab Test 06/17/22  1044 08/26/20  0925   CHOL 139 153   HDL 50 52   LDL 60 71   TRIG 144 149     Testing/Procedures:    TTE 10/15/19:   Normal LV/RV function, LVEF=60-65%.   At least moderate MR and moderate-to-severe TR.  RVSP~39+RAP~15 mmHg.  Compared to 7/24/13: MR and TR have worsened.      Device check 05/11/21: Normal device function. 86.3% AP <1% . Intrinsic rhythm sinus bradycardia at 48 bpm. No atrial or ventricular arrhythmias. Lead trends stable.    Device check 3/25/22: Normal device function. AP: 78% : <0.1% Presenting EGM: AP-VS @ 83 bpm. 2 seconds of AT/AF. <0.1% AF burden. No ventricular arrhythmias.     TTE 05/17/22: LVEF:60-65%, normal RV function, moderate MR, moderate TR, normal IVC      TTE 10/9/23:  Interpretation Summary  Global and regional left ventricular function is normal with an EF of 55-60%.  Right ventricular function, chamber size, wall motion, and thickness are  normal.  Mild to moderate  mitral insufficiency is present.  Mild tricuspid insufficiency is present.  The right ventricular systolic pressure is 35mmHg above the right atrial  pressure.  The inferior vena cava is normal.  No pericardial effusion is present.  ______________________________________________________________________________  Left Ventricle  Global and regional left ventricular function is normal with an EF of 55-60%.  Left ventricular wall thickness is normal. Left ventricular size is normal.  Left ventricular diastolic function is not assessable. No regional wall motion  abnormalities are seen.     Right Ventricle  Right ventricular function, chamber size, wall motion, and thickness are  normal. A pacemaker lead is noted in the right ventricle.     Atria  The atria cannot be assessed.     Mitral Valve  Mild to moderate mitral insufficiency is present.     Aortic Valve  Aortic valve sclerosis is present.     Tricuspid Valve  Mild tricuspid insufficiency is present. The right ventricular systolic  pressure is 35mmHg above the right atrial pressure.     Pulmonic Valve  The pulmonic valve is normal. Mild pulmonic insufficiency is present.     Vessels  The thoracic aorta cannot be assessed. The pulmonary artery cannot be  assessed. The inferior vena cava is normal.     Pericardium  No pericardial effusion is present.     ______________________________________________________________________________  MMode/2D Measurements & Calculations  LVOT diam: 2.1 cm  LVOT area: 3.3 cm2     Doppler Measurements & Calculations  LV V1 max P.1 mmHg  LV V1 max: 100.6 cm/sec  LV V1 VTI: 24.4 cm  SV(LVOT): 81.6 ml  SI(LVOT): 45.6 ml/m2  TR max tawanna: 294.3 cm/sec  TR max P.6 mmHg     CORS 2023:    mments/Patient Narrative    Ms. Tessa Mansfield is a pleasant 88 year old female with medical history pertinent for CAD (PCI at all 3 vessels at various times from 2406-9980, known 80% ISR of LCx), sick sinus syndrome s/p dual-chamber PPM, AF here for  coronary angiography due to persistent HIGGINS.     Pre Procedure Diagnosis    worsening angina       Conclusion         Prox LAD to Mid LAD lesion is 40% stenosed.    Ost LAD to Prox LAD lesion is 50% stenosed.    Prox Cx to Mid Cx lesion is 55% stenosed.    1st Diag lesion is 85% stenosed.    2nd Diag lesion is 70% stenosed.    RPDA-2 lesion is 50% stenosed.    RPDA-1 lesion is 65% stenosed.    Mid LAD lesion is 70% stenosed.    Mid RCA to Dist RCA lesion is 80% stenosed.     Right dominance   3V diease   Distal RCA s/p prior stents with 80% ISR  RPDA with  to 65% stenosis   Prox LAD s/p prior stents x2 with 40 and 50% ISR and diffuse disease in mid to distal LAD segment   First diagonal small caliber with diffuse disease and 85% stenosis in mid segment   Mid Cx with 55% stenosis   ALVAREZ SYNERGY XD MR US 2.29P33AE [id number] in distal RCA with BREANNA 3 pre and post stenting          Plan     Follow bedrest per protocol   Continued medical management and lifestyle modifications for cardiovascular risk factor optimizations.     Recommendations    General Recommendations:  - Patient given specific instructions regarding care of arteriotomy site, activity restrictions, signs and symptoms of cardiac or vascular complications and to seek immediate medical evaluation should they occur.     Coronary Findings    Diagnostic  Dominance: Right  Left Anterior Descending   The vessel is small.   Ost LAD to Prox LAD lesion is 50% stenosed. The lesion was previously treated using a stent of unknown type.   Prox LAD to Mid LAD lesion is 40% stenosed. The lesion was previously treated using a stent of unknown type.   Mid LAD lesion is 70% stenosed.      First Diagonal Branch   The vessel is small. There is moderate diffuse disease throughout the vessel.   1st Diag lesion is 85% stenosed.      Second Diagonal Branch   2nd Diag lesion is 70% stenosed.      Left Circumflex   The vessel is small.   Prox Cx to Mid Cx lesion is 55% stenosed.  The lesion was previously treated using a stent of unknown type.      First Obtuse Marginal Branch   The vessel is large.      Second Obtuse Marginal Branch   The vessel is small.      Right Coronary Artery   The vessel is large.   Mid RCA to Dist RCA lesion is 80% stenosed. The lesion was previously treated using a drug-eluting stent, a stent of unknown type and angioplasty.   Previously placed Dist RCA drug-eluting and unknown type of stents are widely patent. The lesion was previously treated using angioplasty.      Right Posterior Descending Artery   There is mild diffuse disease throughout the vessel.   RPDA-1 lesion is 65% stenosed.   RPDA-2 lesion is 50% stenosed. The lesion was previously treated using a stent of unknown type.      Right Posterior Atrioventricular Artery   There is mild diffuse disease throughout the vessel.         Intervention     Mid RCA to Dist RCA lesion   Stent (Also treats lesions: Dist RCA)   A guide catheter was successfully placed. The crossed the lesion. The pre-interventional distal flow is normal (BREANNA 3). A STENT CORONARY ALVAREZ SYNERGY XD MR US 2.55U89LK [id number] drug eluting stent was successfully placed. Pre-stent angioplastywasperformed using a CATH BALLOON NC EMERGE 2.04I02QZ [id number] supply. . Post-stent angioplasty was performed using a CATH BALLOON NC EMERGE 2.67Z39JY [id number] supply. . The post-interventional distal flow is normal (BREANNA 3). Theinterventionwas successful. No complications occurred at this lesion.   There is a 0% residual stenosis post intervention.      Dist RCA lesion   Stent (Also treats lesions: Mid RCA to Dist RCA)   See details in Mid RCA to Dist RCA lesion.   There is a 0% residual stenosis post intervention.           Pressures Phase: Baseline     Time Systolic (mmHg) Diastolic (mmHg) Mean (mmHg) A Wave (mmHg) V Wave (mmHg) EDP (mmHg) Max dp/dt (mmHg/sec) HR (bpm) Content (mL/dL) SAT (%)   AO Pressures  2:18     75    101      "   65          Hemodynamic Waveforms -- Encounter Level:    Hemodynamic Waveforms: None found at the encounter level.  Hemodynamic Waveforms -- Order Level on 07/17/2023:    Scan on 7/17/2023 3:04 PM by Outside, Provider  Entry Locations    Potential access sites were evaluated for patency using ultrasound.   The right femoral artery was selected. Access was obtained under with Sonosite guidance using a standard 18 guage needle with direct visualization of needle entry.   INTRO SHEATH KRISHNA [id number]   Sheath inserted in the right femoral artery.   CATH ANGIO INFINITI 3DRC [id number]      Sheath exchanged in the right femoral artery.      Stent inserted over the wire.      WIRE GUIDE HI-TRQ  50 JTIP 0.014\"[id number]      CATH BALLOON EMERGE 2.5X12MM [id number]   Balloon inserted to right coronary artery and middle right coronary artery.   STENT CORONARY ALVAREZ SYNERGY XD MR US 2.96V84MU [id number]   Stent inserted over the wire.               STENT CORONARY ALVAREZ SYNERGY XD MR US 2.33F19VN [id number]   Stent inserted over the wire.     Procedure Summary    No Complications - Patient tolerated procedure well  DESCRIPTION:  1. Consent obtained with discussion of risks.  All questions were answered.  2. Sterile prep and procedure.  3. Location with Sheaths:   Rt Femoral Arterial: A micropuncture 21 gauge needle with Sonosite and Fluoroscopic guidance was used to establish vascular access.  Access was successful.  4 Fr 10 cm [short]   4. Access: Local anesthetic with lidocaine.    5. Diagnostic Catheters:The LMCA was successfully engaged with a 4 Fr  JL 4  The RCA was successfully engaged with a 4 Fr  3DRC  6. Guiding Catheters:  6 Fr  JR 4 GC  7. Sheath Management:Single Sheath:  The femoral sheath was manually removed in the cardiac catheterization laboratory.  8. Estimated blood loss: < 5 mlThe attending interventional cardiologist was present and supervised all critical aspects the " procedure.     Coronary angiogram/RHC/PCI 5/27/22:  90% dRCA in-stent -> PCI+ALVAREZ   50-60% pRCA (return for hemodynamic assessment if symptoms persist/recur)  50-60% small LCx (medical management recommended    RA 13 RV 46/13 PA 46/16 (26) PCW 15  Jamila CO 4.76 CI 2.6  PVR by Jamila CO 2.3    Device interrogation 11/29/22: 71% AP. No AF. Normal device function.    Device interrogation 5/30/23     CareAlert received for  AT/AF Daily [place] > Threshold & 9 hours in AT/AF Since Last Session.     Remote pacemaker transmission received and reviewed. Device transmission sent per MD orders.     Device: [employer] [id number] [place] XT DR DIA  Normal Device Function  Mode: AAIR>DDDR  bpm  AP: 73.5%  : 0.1%  Presenting EGM: AF with vent rate ~ 100-110 bpm  Short V-V intervals: 0  Lead Trends Appear Stable: Yes  Estimated battery longevity to RRT = 10.1 years  Atrial Arrhythmia: 2 AT/AF episodes recorded - 13 sec - > 9 hours in duration. Episode remains in progress since 5/29/23 @ 2132.  AF [place]: 0.4%  Anticoagulant: None - Patient reports she is taking Plavix and ASA.  Ventricular Arrhythmia: 0  Pt Notified of Transmission Results: Yes, via telephone. Patient reports she is not feeling well and has Covid. Patient reports that she has been sick since Sunday 5/28/23. Dr. Santoyo notified.     Plan: Device follow-up every 3 months.  GERALDO Gomez RN     Assessment and Plan:     1. CAD s/p multiple PCIs as above (last to RPDA 7/23/13, PCI+DESx1 to dRCA 5/27/22, ISR of dRCA s/p PCI 7/2023):  Dyspnea improved significantly post-PCI 6/2022-- likely her anginal equivalent (near-normal PCW on RHC at the time of coronary angiogram/PCI supports this.) Had recurrent exertional dyspnea 7/2023, found to have ISR of mid to distal RCA, s/p PCI with 2 ALVAREZ. Post cath still experiencing SOB with exertion and upper back pain with exertion, resolves with rest. Reviewed recent cath films with Dr. Fitzgerald. She does have disease of RPDA, also  significant 70% mid LAD lesion which may be the cause of her symptoms. Plan to CORS with LAD PCI.   - Continue Plavix, no ASA while on Eliquis   - Continue Crestor 20 mg daily   - CORS with LAD PCI     2. Paroxysmal atrial fibrillation:  - [id number] of 5 (HTN, age, CAD, female) indicating high risk of stroke  - Eliquis 2.5 mg BID (reduced dose due to CKD, age)  - Continue rate control with metoprolol 100 mg BID     3. Moderate mitral regurgitation, stable by TTE 10/9/2023  4. Mild tricuspid regurgitation, improved by TTE 10/9/2023  - Continue lasix 40 mg BID, Imdur and start hydral 30 mg TID for afterload reduction and BP management   - Will continue to follow clinically and with periodic TTE     5. Renal Hypertension, controlled  6. CKD stage 4  - Currently on amlodipine 10 mg, Imdur 120 mg daily, hydralazine 30 mg TID   -followed by Odilia Martin and Scar     7. Sick sinus syndrome s/p dual chamber PPM:  -routine device clinic follow up    RTC:   Labs this week to ensure stable Hgb and renal function  CORS with LAD PCI  Follow-up Dr. Santoyo 12/2023      JI Loyola, CNP  Structural Heart Nurse Practitioner  [facility]  Clinic: [phone]  Pager: [phone]

## 2023-10-09 ENCOUNTER — ANCILLARY PROCEDURE (OUTPATIENT)
Dept: CARDIOLOGY | Facility: CLINIC | Age: 88
End: 2023-10-09
Attending: NURSE PRACTITIONER
Payer: COMMERCIAL

## 2023-10-09 ENCOUNTER — ANCILLARY PROCEDURE (OUTPATIENT)
Dept: CARDIOLOGY | Facility: CLINIC | Age: 88
End: 2023-10-09
Attending: INTERNAL MEDICINE
Payer: COMMERCIAL

## 2023-10-09 VITALS
OXYGEN SATURATION: 98 % | WEIGHT: 158.8 LBS | DIASTOLIC BLOOD PRESSURE: 70 MMHG | BODY MASS INDEX: 25.52 KG/M2 | HEIGHT: 66 IN | SYSTOLIC BLOOD PRESSURE: 149 MMHG | HEART RATE: 71 BPM

## 2023-10-09 DIAGNOSIS — I10 ESSENTIAL HYPERTENSION: ICD-10-CM

## 2023-10-09 DIAGNOSIS — I49.5 SICK SINUS SYNDROME (H): ICD-10-CM

## 2023-10-09 DIAGNOSIS — I25.118 CORONARY ARTERY DISEASE OF NATIVE ARTERY OF NATIVE HEART WITH STABLE ANGINA PECTORIS (H): ICD-10-CM

## 2023-10-09 DIAGNOSIS — I48.0 PAROXYSMAL ATRIAL FIBRILLATION (H): ICD-10-CM

## 2023-10-09 DIAGNOSIS — R06.02 SOB (SHORTNESS OF BREATH): Primary | ICD-10-CM

## 2023-10-09 DIAGNOSIS — I34.0 NON-RHEUMATIC MITRAL REGURGITATION: ICD-10-CM

## 2023-10-09 LAB
LVEF ECHO: NORMAL
MDC_IDC_LEAD_CONNECTION_STATUS: NORMAL
MDC_IDC_LEAD_CONNECTION_STATUS: NORMAL
MDC_IDC_LEAD_IMPLANT_DT: NORMAL
MDC_IDC_LEAD_IMPLANT_DT: NORMAL
MDC_IDC_LEAD_LOCATION: NORMAL
MDC_IDC_LEAD_LOCATION: NORMAL
MDC_IDC_LEAD_LOCATION_DETAIL_1: NORMAL
MDC_IDC_LEAD_LOCATION_DETAIL_1: NORMAL
MDC_IDC_LEAD_MFG: NORMAL
MDC_IDC_LEAD_MFG: NORMAL
MDC_IDC_LEAD_MODEL: NORMAL
MDC_IDC_LEAD_MODEL: NORMAL
MDC_IDC_LEAD_POLARITY_TYPE: NORMAL
MDC_IDC_LEAD_POLARITY_TYPE: NORMAL
MDC_IDC_LEAD_SERIAL: NORMAL
MDC_IDC_LEAD_SERIAL: NORMAL
MDC_IDC_LEAD_SPECIAL_FUNCTION: NORMAL
MDC_IDC_LEAD_SPECIAL_FUNCTION: NORMAL
MDC_IDC_MSMT_BATTERY_DTM: NORMAL
MDC_IDC_MSMT_BATTERY_REMAINING_LONGEVITY: 116 MO
MDC_IDC_MSMT_BATTERY_RRT_TRIGGER: 2.62
MDC_IDC_MSMT_BATTERY_STATUS: NORMAL
MDC_IDC_MSMT_BATTERY_VOLTAGE: 3.01 V
MDC_IDC_MSMT_LEADCHNL_RA_IMPEDANCE_VALUE: 323 OHM
MDC_IDC_MSMT_LEADCHNL_RA_IMPEDANCE_VALUE: 361 OHM
MDC_IDC_MSMT_LEADCHNL_RA_PACING_THRESHOLD_AMPLITUDE: 0.5 V
MDC_IDC_MSMT_LEADCHNL_RA_PACING_THRESHOLD_PULSEWIDTH: 0.4 MS
MDC_IDC_MSMT_LEADCHNL_RA_SENSING_INTR_AMPL: 2 MV
MDC_IDC_MSMT_LEADCHNL_RA_SENSING_INTR_AMPL: 2.38 MV
MDC_IDC_MSMT_LEADCHNL_RV_IMPEDANCE_VALUE: 418 OHM
MDC_IDC_MSMT_LEADCHNL_RV_IMPEDANCE_VALUE: 456 OHM
MDC_IDC_MSMT_LEADCHNL_RV_PACING_THRESHOLD_AMPLITUDE: 1 V
MDC_IDC_MSMT_LEADCHNL_RV_PACING_THRESHOLD_PULSEWIDTH: 0.4 MS
MDC_IDC_MSMT_LEADCHNL_RV_SENSING_INTR_AMPL: 9.5 MV
MDC_IDC_MSMT_LEADCHNL_RV_SENSING_INTR_AMPL: 9.5 MV
MDC_IDC_PG_IMPLANT_DTM: NORMAL
MDC_IDC_PG_MFG: NORMAL
MDC_IDC_PG_MODEL: NORMAL
MDC_IDC_PG_SERIAL: NORMAL
MDC_IDC_PG_TYPE: NORMAL
MDC_IDC_SESS_CLINIC_NAME: NORMAL
MDC_IDC_SESS_DTM: NORMAL
MDC_IDC_SESS_TYPE: NORMAL
MDC_IDC_SET_BRADY_AT_MODE_SWITCH_RATE: 171 {BEATS}/MIN
MDC_IDC_SET_BRADY_HYSTRATE: NORMAL
MDC_IDC_SET_BRADY_LOWRATE: 60 {BEATS}/MIN
MDC_IDC_SET_BRADY_MAX_SENSOR_RATE: 130 {BEATS}/MIN
MDC_IDC_SET_BRADY_MAX_TRACKING_RATE: 130 {BEATS}/MIN
MDC_IDC_SET_BRADY_MODE: NORMAL
MDC_IDC_SET_BRADY_PAV_DELAY_LOW: 180 MS
MDC_IDC_SET_BRADY_SAV_DELAY_LOW: 150 MS
MDC_IDC_SET_LEADCHNL_RA_PACING_AMPLITUDE: 1.5 V
MDC_IDC_SET_LEADCHNL_RA_PACING_ANODE_ELECTRODE_1: NORMAL
MDC_IDC_SET_LEADCHNL_RA_PACING_ANODE_LOCATION_1: NORMAL
MDC_IDC_SET_LEADCHNL_RA_PACING_CAPTURE_MODE: NORMAL
MDC_IDC_SET_LEADCHNL_RA_PACING_CATHODE_ELECTRODE_1: NORMAL
MDC_IDC_SET_LEADCHNL_RA_PACING_CATHODE_LOCATION_1: NORMAL
MDC_IDC_SET_LEADCHNL_RA_PACING_POLARITY: NORMAL
MDC_IDC_SET_LEADCHNL_RA_PACING_PULSEWIDTH: 0.4 MS
MDC_IDC_SET_LEADCHNL_RA_SENSING_ANODE_ELECTRODE_1: NORMAL
MDC_IDC_SET_LEADCHNL_RA_SENSING_ANODE_LOCATION_1: NORMAL
MDC_IDC_SET_LEADCHNL_RA_SENSING_CATHODE_ELECTRODE_1: NORMAL
MDC_IDC_SET_LEADCHNL_RA_SENSING_CATHODE_LOCATION_1: NORMAL
MDC_IDC_SET_LEADCHNL_RA_SENSING_POLARITY: NORMAL
MDC_IDC_SET_LEADCHNL_RA_SENSING_SENSITIVITY: 0.3 MV
MDC_IDC_SET_LEADCHNL_RV_PACING_AMPLITUDE: 2 V
MDC_IDC_SET_LEADCHNL_RV_PACING_ANODE_ELECTRODE_1: NORMAL
MDC_IDC_SET_LEADCHNL_RV_PACING_ANODE_LOCATION_1: NORMAL
MDC_IDC_SET_LEADCHNL_RV_PACING_CAPTURE_MODE: NORMAL
MDC_IDC_SET_LEADCHNL_RV_PACING_CATHODE_ELECTRODE_1: NORMAL
MDC_IDC_SET_LEADCHNL_RV_PACING_CATHODE_LOCATION_1: NORMAL
MDC_IDC_SET_LEADCHNL_RV_PACING_POLARITY: NORMAL
MDC_IDC_SET_LEADCHNL_RV_PACING_PULSEWIDTH: 0.4 MS
MDC_IDC_SET_LEADCHNL_RV_SENSING_ANODE_ELECTRODE_1: NORMAL
MDC_IDC_SET_LEADCHNL_RV_SENSING_ANODE_LOCATION_1: NORMAL
MDC_IDC_SET_LEADCHNL_RV_SENSING_CATHODE_ELECTRODE_1: NORMAL
MDC_IDC_SET_LEADCHNL_RV_SENSING_CATHODE_LOCATION_1: NORMAL
MDC_IDC_SET_LEADCHNL_RV_SENSING_POLARITY: NORMAL
MDC_IDC_SET_LEADCHNL_RV_SENSING_SENSITIVITY: 0.9 MV
MDC_IDC_SET_ZONE_DETECTION_INTERVAL: 350 MS
MDC_IDC_SET_ZONE_DETECTION_INTERVAL: 400 MS
MDC_IDC_SET_ZONE_STATUS: NORMAL
MDC_IDC_SET_ZONE_STATUS: NORMAL
MDC_IDC_SET_ZONE_TYPE: NORMAL
MDC_IDC_SET_ZONE_VENDOR_TYPE: NORMAL
MDC_IDC_STAT_AT_BURDEN_PERCENT: 0 %
MDC_IDC_STAT_AT_DTM_END: NORMAL
MDC_IDC_STAT_AT_DTM_START: NORMAL
MDC_IDC_STAT_BRADY_AP_VP_PERCENT: 0.06 %
MDC_IDC_STAT_BRADY_AP_VS_PERCENT: 73.09 %
MDC_IDC_STAT_BRADY_AS_VP_PERCENT: 0.03 %
MDC_IDC_STAT_BRADY_AS_VS_PERCENT: 26.83 %
MDC_IDC_STAT_BRADY_DTM_END: NORMAL
MDC_IDC_STAT_BRADY_DTM_START: NORMAL
MDC_IDC_STAT_BRADY_RA_PERCENT_PACED: 75.07 %
MDC_IDC_STAT_BRADY_RV_PERCENT_PACED: 0.12 %
MDC_IDC_STAT_EPISODE_RECENT_COUNT: 0
MDC_IDC_STAT_EPISODE_RECENT_COUNT_DTM_END: NORMAL
MDC_IDC_STAT_EPISODE_RECENT_COUNT_DTM_START: NORMAL
MDC_IDC_STAT_EPISODE_TOTAL_COUNT: 0
MDC_IDC_STAT_EPISODE_TOTAL_COUNT: 6
MDC_IDC_STAT_EPISODE_TOTAL_COUNT_DTM_END: NORMAL
MDC_IDC_STAT_EPISODE_TOTAL_COUNT_DTM_START: NORMAL
MDC_IDC_STAT_EPISODE_TYPE: NORMAL
MDC_IDC_STAT_TACHYTHERAPY_RECENT_DTM_END: NORMAL
MDC_IDC_STAT_TACHYTHERAPY_RECENT_DTM_START: NORMAL
MDC_IDC_STAT_TACHYTHERAPY_TOTAL_DTM_END: NORMAL
MDC_IDC_STAT_TACHYTHERAPY_TOTAL_DTM_START: NORMAL

## 2023-10-09 PROCEDURE — 93280 PM DEVICE PROGR EVAL DUAL: CPT | Performed by: INTERNAL MEDICINE

## 2023-10-09 PROCEDURE — G0463 HOSPITAL OUTPT CLINIC VISIT: HCPCS | Performed by: NURSE PRACTITIONER

## 2023-10-09 PROCEDURE — 99214 OFFICE O/P EST MOD 30 MIN: CPT | Mod: 25 | Performed by: NURSE PRACTITIONER

## 2023-10-09 PROCEDURE — 93306 TTE W/DOPPLER COMPLETE: CPT | Performed by: INTERNAL MEDICINE

## 2023-10-09 ASSESSMENT — PAIN SCALES - GENERAL: PAINLEVEL: NO PAIN (0)

## 2023-10-09 NOTE — NURSING NOTE
Chief Complaint   Patient presents with    Follow Up     Follow up CAD, Afib       Vitals were taken, medications reconciled.    Kristen Heath, EMT   12:16 PM

## 2023-10-09 NOTE — PATIENT INSTRUCTIONS
You were seen today in the Cardiovascular Clinic at the [facility].    Cardiology provider you saw during your visit Randa Ann NP.    Continue current medications.  2. Have labs drawn tomorrow at 10:00 [facility].   3. I will review angiogram films with IC and let you know if there is need for further intervention.   4. Please make a follow-up appointment with Dr. Santoyo in December or January.     Questions and [phone].   First press #1 for the University and then press #3 for Medical Questions to reach the Cardiology triage nurse.     On Call Cardiologist for after hours or on weekends: [phone], press option #4 and ask to speak to the on-call Cardiologist.

## 2023-10-09 NOTE — LETTER
10/9/2023      RE: Tessa Mansfield  [address]  [facility] [address]       Dear Colleague,    Thank you for the opportunity to participate in the care of your patient, Tessa Mansfield, at the [facility] [place] [facility]. Please see a copy of my visit note below.    Chief complaint: 4 month f/up CAD  HPI:   Tessa Mansfield is a 88 year old female patient of Dr. Santoyo with extensive CAD history as detailed below (PCI to all 3 vessels at various times from 2262-0761, last PCA to RPDA 7/23/13 and known 80% ISR of small LCX) on indefinite dual antiplatelet therapy and sick sinus syndrome s/p dual-chamber PPM, device detected atrial fibrillation now on AC, CKD, last evaluated 7/2023 at which time she reported increase SOB, underwent coronary angiogram which showed ISR of dRCA s/p PCI 7/17/23.    12/10/19:  She underwent pacemaker generator change 10/15/19 due to her prior generator reaching LOLA (new device: Medtronic [id number] [place] XT DR MRI dual chamber PPM) without event.      She reports volume status has been about stable and that she has self-titrated furosemide as needed and has done so for some time.      5/11/21:  Since her last visit, Tessa reports feeling generally well. She does have occasional episodes of angina (pain between her shoulders) for which she takes 1 NTG every 2-3 months, which has been longstanding and stable.      She denies any dyspnea at rest or with exertion, PND, orthopnea, peripheral edema, palpitations, lightheadedness or syncope.      5/17/2022:  Patient states that she has had SOB for the last month. States that she is able to walk 1-2 blocks before developing SOB, prior to this she was able to walk about 4 blocks before becoming SOB.  She denies chest pain, chest pressure, lightheadedness or dizziness.     12/06/22:  Since her last visit, she underwent coronary angiogram and PCI+ALVAREZ to RCA with dramatic  improvement in her exertional dyspnea. She completed cardiac rehab. She does think she could walk somewhat further than before but has not pushed her exercise capacity recently. She plans to start this in in the new year. She has not required NTG at all since her last visit.     She denies any dyspnea at rest or with exertion, PND, orthopnea, peripheral edema, palpitations, lightheadedness or syncope.     June 2023:  Had COVID a few weeks ago with symptoms of fatigue and short of breath. Device interrogation showed > 9 hours of atrial fibrillation on 5/29/23 in the setting of COVID 19 infection. She was not aware that she was in atrial fibrillation - no palpitations, lightheadedness, syncope. Continues to feel fatigued post COVID, though denies significant shortness of breath reminiscent of her prior angina. No chest pain or pressure. No orthopnea, PND. She has chronic leg swelling which improves with lasix and elevation. BLE edema has been stable. Has gained 5 lbs over the past few months. BP is usually high in clinic - home BP slightly above goal 134/60-140s. Developed worsening renal function on higher dose of lasix, ultimately underwent coronary angiogram with PCI of dRCA 7/18/23. She was readmitted 7/19 with melena - ASA and Brilinta stopped and plavix started with Eliquis 2.5 mg BID.    October 9th 2023:  Patient reports maybe slight improvement in breathing since her stent placement, but remains short of breath with exertion such as housework or going for a walk. She can walk or work around the house for about 15 minutes before having to stop and rest. Her breathing hasn't really gotten any worse, just hasn't gotten better. She denies any chest pain or pressure but over the past 2 weeks she has been experiencing an ache in her upper back behind her heart, worse with activity, improves with rest. She has had upper back pain before with prior angina, but usually radiates throughout the whole back. This is more  localized. She denies any orthopnea, PND, weight gain. Leg swelling was worse, now better after increasing lasix to 40 mg BID about a week ago. She has not lightheadedness, palpitations or syncope. She has diarrhea, no bloody, dark, tarry looking stools. Home -137/50s-60s.     Summary of CAD history:  10/27/2002: AMI s/p PCI+BMS (3.5x18mm Bx velocity) to mRCA  2/5/2003: Back pain; PCI+stent to mLCX c/b distal embolization/slow flow  4/11/2003: Unstable angina; PCI+stent (Hepacoat velocity) to mLAD  11/27/2007: PCI+DESx2 to pLAD (IVUS w/ calcification of LMCA and ulcerated plaque pLAD, 80% dRCA; also had 80% dLCX, too small to stent)  12/11/2007: Staged PCI. PCI+DESx1 (3.5x13mm Cypher) to dRCA (indefinite DAPT recommended at this time)  6/27/2008: Angina. mLCX stent 70% ISR. LAD and RCA stents patent. Myocardium at risk from LCX felt to be small, medical management preferred to PCI.  11/10/2009: Angina. Findings unchanged from 6/27/2008, FFR LAD 0.90. Medical management recommended.  7/23/2013: Unstable angina; PCI+ALVAREZ to mPDA. Diagnostic findings: LMCA 40% distal. LAD: pLAD stents patent mLAD 30% ISR dLAD 50% diffuse, D2 diffuse disease. LCX 80% mid ISR. RCA diffuse <30% mid, RPLAs diffuse disease, RPDA 100% occlusion.   5/27/2022: 90% dRCA in-stent -> PCI+DESx1 to dRCA.  50-60% pRCA (return for hemodynamic assessment if symptoms persist/recur) 50-60% small LCx (medical management recommended)   7/18/23: 80% ISR of dRCA --> PCI w/ALVAREZ x2, RPDA-2 lesion is 50% stenosed, RPDA-1 lesion is 65% stenosed, Ost LAD to Prox LAD lesion is 50% stenosed, Prox LAD to Mid LAD lesion is 40% stenosed, mLAD 70% stenosed, prox Cx to Mid Cx lesion is 55% stenosed small vessel.    PAST MEDICAL HISTORY:  Past Medical History:   Diagnosis Date    CAD (coronary artery disease)     CAD s/p PCI+BMS to MRCA 10/2002, PCI to mLCX 2/2003, PCI+DESx2 to pLAD 11/2007, PCI+DESx1 to dRCA 12/2007, PCI+ALVAREZ to RPDA 7/2013; on indefinite DAPT   10/27/2002    10/27/2002: AMI s/p PCI+BMS (3.5x18mm Bx velocity) to mRCA 2/5/2003: Back pain; PCI+stent to mLCX c/b distal embolization/slow flow 4/11/2003: Unstable angina; PCI+stent (Hepacoat velocity) to mLAD 11/27/2007: PCI+DESx2 to pLAD (IVUS w/ calcification of LMCA and ulcerated plaque pLAD, 80% dRCA; also had 80% dLCX, too small to stent) 12/11/2007: Staged PCI. PCI+DESx1 (3.5x13mm Cypher) to dRCA (indefinite DAPT recommended at this time) 6/27/2008: Angina. mLCX stent 70% ISR. LAD and RCA stents patent. Myocardium at risk from LCX felt to be small, medical management preferred to PCI. 11/10/2009: Angina. Findings unchanged from 6/27/2008, FFR LAD 0.90. Medical management recommended. 7/23/2013: Unstable angina; PCI+ALVAREZ to mPDA. Diagnostic findings: LMCA 40% distal. LAD: pLAD stents patent mLAD 30% ISR dLAD 50% diffuse, D2 diffuse disease. LCX 80% mid ISR. RCA diffuse <30% mid, RPLAs diffuse disease, RPDA 100% occlusion.      Cardiac Pacemaker- Medtronic, dual chamber- NOT dependant 11/28/2007    CKD (chronic kidney disease), stage IV (H)     Hyperlipidemia LDL goal <70     Hypertension     Stented coronary artery 10-    RCA    Stented coronary artery 2-5-2003    LCx    Stented coronary artery 4-    LAD    Stented coronary artery 11-    LAD    Stented coronary artery 12-    RCA    Transient complete heart block (H) 11/28/2007   New device detected atrial fibrillation > 9 hours 5/29/23    CURRENT MEDICATIONS:  Current Outpatient Medications   Medication    allopurinol (ZYLOPRIM) 100 MG tablet    amLODIPine (NORVASC) 10 MG tablet    apixaban ANTICOAGULANT (ELIQUIS) 2.5 MG tablet    budesonide (PULMICORT) 0.5 MG/2ML neb solution    clopidogrel (PLAVIX) 75 MG tablet    cyanocolbalamin (VITAMIN B-12) 1000 MCG tablet    ferrous sulfate (FEROSUL) 325 (65 Fe) MG tablet    fluticasone-salmeterol (ADVAIR) 250-50 MCG/ACT inhaler    furosemide (LASIX) 40 MG tablet    hydrALAZINE (APRESOLINE)  "10 MG tablet    isosorbide mononitrate (ISMO/MONOKET) 20 MG tablet    metoprolol tartrate (LOPRESSOR) 100 MG tablet    Multiple Vitamins-Minerals (PRESERVISION AREDS 2+MULTI VIT PO)    omeprazole (PRILOSEC) 40 MG DR capsule    potassium chloride ER (K-TAB/KLOR-CON) 10 MEQ CR tablet    rosuvastatin (CRESTOR) 20 MG tablet    timolol maleate (TIMOPTIC) 0.5 % ophthalmic solution    VITAMIN D3 25 MCG (1000 UT) tablet     No current facility-administered medications for this visit.     ALLERGIES:     Allergies   Allergen Reactions    Bactrim [Sulfamethoxazole W/Trimethoprim] Other (See Comments)     Patient unable to recall    Biaxin [Clarithromycin]     Chlorthalidone Nausea and Vomiting    Clonidine     Codeine Sulfate Itching    Darvon [Propoxyphene Hcl]     Dilaudid [Hydromorphone] Visual Disturbance     Hallucinations      Gabapentin Other (See Comments) and Fatigue     \"felt drunk\"    Indomethacin     Levaquin [Levofloxacin Hemihydrate]     Morphine Sulfate     Percocet [Oxycodone-Acetaminophen] Other (See Comments)     hallucinations    Pregabalin Fatigue    Pregabalin Itching     Other reaction(s): Fatigue    Shrimp Swelling    Spironolactone Other (See Comments)     Dehydrated      Terazosin     Ciprofloxacin Rash    Simvastatin Palpitations     Muscle weakness, leg cramping         FAMILY HISTORY:  Family History   Problem Relation Age of Onset    Cancer Sister 82        bladder cancer       SOCIAL HISTORY:  Social History     Tobacco Use    Smoking status: Former     Packs/day: 0.30     Years: 15.00     Pack years: 4.50     Types: Cigarettes    Smokeless tobacco: Never    Tobacco comments:     Quit 22+ years ago   Substance Use Topics    Drug use: No       ROS:   A comprehensive 14 point review of systems is negative other than as mentioned in HPI.    Exam:  BP (!) 149/70 (BP Location: Right arm, Patient Position: Sitting, Cuff Size: Adult Regular)   Pulse 71   Ht 1.687 m (5' 6.42\")   Wt 72 kg (158 lb 12.8 " oz)   SpO2 98%   BMI 25.31 kg/m    GENERAL APPEARANCE: healthy, alert and no distress  EYES: no icterus, no xanthelasmas  ENT: normal palate, mucosa moist, no central cyanosis  NECK: No JVD   RESPIRATORY: lungs clear to auscultation - no rales, rhonchi or wheezes, no use of accessory muscles, no retractions, respirations are unlabored, normal respiratory rate  CARDIOVASCULAR: regular rhythm, II/VI systolic murmur LLSB and apex  GI: soft, non tender, bowel sounds normal,no abdominal bruits  EXTREMITIES: no edema, no bruits  NEURO: alert and oriented to person/place/time, normal speech, gait and affect  VASC: Radial, dorsalis pedis and posterior tibialis pulses 2+ bilaterally.  SKIN: no ecchymoses, no rashes.  PSYCH: cooperative, affect appropriate.     Labs:  Last Comprehensive Metabolic Panel:  Lab Results   Component Value Date     09/01/2023    POTASSIUM 4.5 09/01/2023    CHLORIDE 109 (H) 09/01/2023    CO2 20 (L) 09/01/2023    ANIONGAP 12 09/01/2023    GLC 85 09/01/2023    BUN 40.6 (H) 09/01/2023    CR 1.95 (H) 09/01/2023    GFRESTIMATED 24 (L) 09/01/2023    ALEXANDRO 8.9 09/01/2023       CBC RESULTS: Recent Labs   Lab Test 04/25/23  0850 11/21/22  1336   WBC  --  7.2   RBC  --  3.75*   HGB 11.0* 11.0*  11.0*   HCT  --  36.7   MCV  --  98   MCH  --  29.3   MCHC  --  30.6*   RDW  --  14.8   PLT  --  125*     Recent Labs   Lab Test 06/17/22  1044 08/26/20  0925   CHOL 139 153   HDL 50 52   LDL 60 71   TRIG 144 149     Testing/Procedures:    TTE 10/15/19:   Normal LV/RV function, LVEF=60-65%.   At least moderate MR and moderate-to-severe TR.  RVSP~39+RAP~15 mmHg.  Compared to 7/24/13: MR and TR have worsened.      Device check 05/11/21: Normal device function. 86.3% AP <1% . Intrinsic rhythm sinus bradycardia at 48 bpm. No atrial or ventricular arrhythmias. Lead trends stable.    Device check 3/25/22: Normal device function. AP: 78% : <0.1% Presenting EGM: AP-VS @ 83 bpm. 2 seconds of AT/AF. <0.1% AF burden.  No ventricular arrhythmias.     TTE 22: LVEF:60-65%, normal RV function, moderate MR, moderate TR, normal IVC      TTE 10/9/23:  Interpretation Summary  Global and regional left ventricular function is normal with an EF of 55-60%.  Right ventricular function, chamber size, wall motion, and thickness are  normal.  Mild to moderate mitral insufficiency is present.  Mild tricuspid insufficiency is present.  The right ventricular systolic pressure is 35mmHg above the right atrial  pressure.  The inferior vena cava is normal.  No pericardial effusion is present.  ______________________________________________________________________________  Left Ventricle  Global and regional left ventricular function is normal with an EF of 55-60%.  Left ventricular wall thickness is normal. Left ventricular size is normal.  Left ventricular diastolic function is not assessable. No regional wall motion  abnormalities are seen.     Right Ventricle  Right ventricular function, chamber size, wall motion, and thickness are  normal. A pacemaker lead is noted in the right ventricle.     Atria  The atria cannot be assessed.     Mitral Valve  Mild to moderate mitral insufficiency is present.     Aortic Valve  Aortic valve sclerosis is present.     Tricuspid Valve  Mild tricuspid insufficiency is present. The right ventricular systolic  pressure is 35mmHg above the right atrial pressure.     Pulmonic Valve  The pulmonic valve is normal. Mild pulmonic insufficiency is present.     Vessels  The thoracic aorta cannot be assessed. The pulmonary artery cannot be  assessed. The inferior vena cava is normal.     Pericardium  No pericardial effusion is present.     ______________________________________________________________________________  MMode/2D Measurements & Calculations  LVOT diam: 2.1 cm  LVOT area: 3.3 cm2     Doppler Measurements & Calculations  LV V1 max P.1 mmHg  LV V1 max: 100.6 cm/sec  LV V1 VTI: 24.4 cm  SV(LVOT): 81.6  ml  SI(LVOT): 45.6 ml/m2  TR max tawanna: 294.3 cm/sec  TR max P.6 mmHg     CORS 2023:    mments/Patient Narrative    Ms. Tessa Mansfield is a pleasant 88 year old female with medical history pertinent for CAD (PCI at all 3 vessels at various times from 9845-4718, known 80% ISR of LCx), sick sinus syndrome s/p dual-chamber PPM, AF here for coronary angiography due to persistent HIGGINS.     Pre Procedure Diagnosis    worsening angina       Conclusion         Prox LAD to Mid LAD lesion is 40% stenosed.    Ost LAD to Prox LAD lesion is 50% stenosed.    Prox Cx to Mid Cx lesion is 55% stenosed.    1st Diag lesion is 85% stenosed.    2nd Diag lesion is 70% stenosed.    RPDA-2 lesion is 50% stenosed.    RPDA-1 lesion is 65% stenosed.    Mid LAD lesion is 70% stenosed.    Mid RCA to Dist RCA lesion is 80% stenosed.     Right dominance   3V diease   Distal RCA s/p prior stents with 80% ISR  RPDA with  to 65% stenosis   Prox LAD s/p prior stents x2 with 40 and 50% ISR and diffuse disease in mid to distal LAD segment   First diagonal small caliber with diffuse disease and 85% stenosis in mid segment   Mid Cx with 55% stenosis   ALVAREZ SYNERGY XD MR US 2.29W91IF [id number] in distal RCA with BREANNA 3 pre and post stenting          Plan     Follow bedrest per protocol   Continued medical management and lifestyle modifications for cardiovascular risk factor optimizations.     Recommendations    General Recommendations:  - Patient given specific instructions regarding care of arteriotomy site, activity restrictions, signs and symptoms of cardiac or vascular complications and to seek immediate medical evaluation should they occur.     Coronary Findings    Diagnostic  Dominance: Right  Left Anterior Descending   The vessel is small.   Ost LAD to Prox LAD lesion is 50% stenosed. The lesion was previously treated using a stent of unknown type.   Prox LAD to Mid LAD lesion is 40% stenosed. The lesion was previously treated using a stent  of unknown type.   Mid LAD lesion is 70% stenosed.      First Diagonal Branch   The vessel is small. There is moderate diffuse disease throughout the vessel.   1st Diag lesion is 85% stenosed.      Second Diagonal Branch   2nd Diag lesion is 70% stenosed.      Left Circumflex   The vessel is small.   Prox Cx to Mid Cx lesion is 55% stenosed. The lesion was previously treated using a stent of unknown type.      First Obtuse Marginal Branch   The vessel is large.      Second Obtuse Marginal Branch   The vessel is small.      Right Coronary Artery   The vessel is large.   Mid RCA to Dist RCA lesion is 80% stenosed. The lesion was previously treated using a drug-eluting stent, a stent of unknown type and angioplasty.   Previously placed Dist RCA drug-eluting and unknown type of stents are widely patent. The lesion was previously treated using angioplasty.      Right Posterior Descending Artery   There is mild diffuse disease throughout the vessel.   RPDA-1 lesion is 65% stenosed.   RPDA-2 lesion is 50% stenosed. The lesion was previously treated using a stent of unknown type.      Right Posterior Atrioventricular Artery   There is mild diffuse disease throughout the vessel.         Intervention     Mid RCA to Dist RCA lesion   Stent (Also treats lesions: Dist RCA)   A guide catheter was successfully placed. The crossed the lesion. The pre-interventional distal flow is normal (BREANNA 3). A STENT CORONARY ALVAREZ SYNERGY XD MR US 2.25F83LJ [id number] drug eluting stent was successfully placed. Pre-stent angioplastywasperformed using a CATH BALLOON NC EMERGE 2.91A03LC [id number] supply. . Post-stent angioplasty was performed using a CATH BALLOON NC EMERGE 2.01F87EK [id number] supply. . The post-interventional distal flow is normal (BREANNA 3). Theinterventionwas successful. No complications occurred at this lesion.   There is a 0% residual stenosis post intervention.      Dist RCA lesion   Stent (Also treats lesions:  "Mid RCA to Dist RCA)   See details in Mid RCA to Dist RCA lesion.   There is a 0% residual stenosis post intervention.           Pressures Phase: Baseline     Time Systolic (mmHg) Diastolic (mmHg) Mean (mmHg) A Wave (mmHg) V Wave (mmHg) EDP (mmHg) Max dp/dt (mmHg/sec) HR (bpm) Content (mL/dL) SAT (%)   AO Pressures  2:18     75    101        65          Hemodynamic Waveforms -- Encounter Level:    Hemodynamic Waveforms: None found at the encounter level.  Hemodynamic Waveforms -- Order Level on 07/17/2023:    Scan on 7/17/2023 3:04 PM by Outside, Provider  Entry Locations    Potential access sites were evaluated for patency using ultrasound.   The right femoral artery was selected. Access was obtained under with Sonosite guidance using a standard 18 guage needle with direct visualization of needle entry.   INTRO SHEATH KRISHNA [id number]   Sheath inserted in the right femoral artery.   CATH ANGIO INFINITI 3DRC [id number]      Sheath exchanged in the right femoral artery.      Stent inserted over the wire.      WIRE GUIDE HI-TRQ  50 JTIP 0.014\"[id number]      CATH BALLOON EMERGE 2.5X12MM [id number]   Balloon inserted to right coronary artery and middle right coronary artery.   STENT CORONARY ALVAREZ SYNERGY XD MR US 2.43I86TX [id number]   Stent inserted over the wire.               STENT CORONARY LAVAREZ SYNERGY XD MR US 2.36L58JG [id number]   Stent inserted over the wire.     Procedure Summary    No Complications - Patient tolerated procedure well  DESCRIPTION:  1. Consent obtained with discussion of risks.  All questions were answered.  2. Sterile prep and procedure.  3. Location with Sheaths:   Rt Femoral Arterial: A micropuncture 21 gauge needle with Sonosite and Fluoroscopic guidance was used to establish vascular access.  Access was successful.  4 Fr 10 cm [short]   4. Access: Local anesthetic with lidocaine.    5. Diagnostic Catheters:The LMCA was successfully engaged with a 4 " Fr  JL 4  The RCA was successfully engaged with a 4 Fr  3DRC  6. Guiding Catheters:  6 Fr  JR 4 GC  7. Sheath Management:Single Sheath:  The femoral sheath was manually removed in the cardiac catheterization laboratory.  8. Estimated blood loss: < 5 mlThe attending interventional cardiologist was present and supervised all critical aspects the procedure.     Coronary angiogram/RHC/PCI 5/27/22:  90% dRCA in-stent -> PCI+ALVAREZ   50-60% pRCA (return for hemodynamic assessment if symptoms persist/recur)  50-60% small LCx (medical management recommended    RA 13 RV 46/13 PA 46/16 (26) PCW 15  Jamila CO 4.76 CI 2.6  PVR by Jamila CO 2.3    Device interrogation 11/29/22: 71% AP. No AF. Normal device function.    Device interrogation 5/30/23     CareAlert received for  AT/AF Daily [place] > Threshold & 9 hours in AT/AF Since Last Session.     Remote pacemaker transmission received and reviewed. Device transmission sent per MD orders.     Device: Simple[employer]ition [id number] [place] XT  MRI  Normal Device Function  Mode: AAIR>DDDR  bpm  AP: 73.5%  : 0.1%  Presenting EGM: AF with vent rate ~ 100-110 bpm  Short V-V intervals: 0  Lead Trends Appear Stable: Yes  Estimated battery longevity to RRT = 10.1 years  Atrial Arrhythmia: 2 AT/AF episodes recorded - 13 sec - > 9 hours in duration. Episode remains in progress since 5/29/23 @ 2132.  AF [place]: 0.4%  Anticoagulant: None - Patient reports she is taking Plavix and ASA.  Ventricular Arrhythmia: 0  Pt Notified of Transmission Results: Yes, via telephone. Patient reports she is not feeling well and has Covid. Patient reports that she has been sick since Sunday 5/28/23. Dr. Santoyo notified.     Plan: Device follow-up every 3 months.  GERALDO Gomez RN     Assessment and Plan:     1. CAD s/p multiple PCIs as above (last to RPDA 7/23/13, PCI+DESx1 to dRCA 5/27/22, ISR of dRCA s/p PCI 7/2023):  Dyspnea improved significantly post-PCI 6/2022-- likely her anginal equivalent (near-normal PCW on RHC  at the time of coronary angiogram/PCI supports this.) Had recurrent exertional dyspnea 7/2023, found to have ISR of mid to distal RCA, s/p PCI with 2 ALVAREZ. Post cath still experiencing SOB with exertion and upper back pain with exertion, resolves with rest. Reviewed recent cath films with Dr. Fitzgerald. She does have disease of RPDA, also significant 70% mid LAD lesion which may be the cause of her symptoms. Plan to CORS with LAD PCI.   - Continue Plavix, no ASA while on Eliquis   - Continue Crestor 20 mg daily   - CORS with LAD PCI     2. Paroxysmal atrial fibrillation:  - [id number] of 5 (HTN, age, CAD, female) indicating high risk of stroke  - Eliquis 2.5 mg BID (reduced dose due to CKD, age)  - Continue rate control with metoprolol 100 mg BID     3. Moderate mitral regurgitation, stable by TTE 10/9/2023  4. Mild tricuspid regurgitation, improved by TTE 10/9/2023  - Continue lasix 40 mg BID, Imdur and start hydral 30 mg TID for afterload reduction and BP management   - Will continue to follow clinically and with periodic TTE     5. Renal Hypertension, controlled  6. CKD stage 4  - Currently on amlodipine 10 mg, Imdur 120 mg daily, hydralazine 30 mg TID   -followed by Odilia Martin and Scar     7. Sick sinus syndrome s/p dual chamber PPM:  -routine device clinic follow up    RTC:   Labs this week to ensure stable Hgb and renal function  CORS with LAD PCI  Follow-up Dr. Santoyo 12/2023      JI Loyola, CNP  Structural Heart Nurse Practitioner  [facility]  Clinic: [phone]  Pager: [phone]

## 2023-10-10 ENCOUNTER — LAB (OUTPATIENT)
Dept: LAB | Facility: CLINIC | Age: 88
End: 2023-10-10
Payer: COMMERCIAL

## 2023-10-10 DIAGNOSIS — N18.4 CKD (CHRONIC KIDNEY DISEASE) STAGE 4, GFR 15-29 ML/MIN (H): ICD-10-CM

## 2023-10-10 DIAGNOSIS — R06.02 SOB (SHORTNESS OF BREATH): ICD-10-CM

## 2023-10-10 DIAGNOSIS — I10 ESSENTIAL HYPERTENSION: ICD-10-CM

## 2023-10-10 DIAGNOSIS — I25.118 CORONARY ARTERY DISEASE OF NATIVE ARTERY OF NATIVE HEART WITH STABLE ANGINA PECTORIS (H): ICD-10-CM

## 2023-10-10 DIAGNOSIS — D63.1 ANEMIA DUE TO STAGE 3B CHRONIC KIDNEY DISEASE (H): ICD-10-CM

## 2023-10-10 DIAGNOSIS — I25.118 CORONARY ARTERY DISEASE OF NATIVE ARTERY OF NATIVE HEART WITH STABLE ANGINA PECTORIS (H): Primary | ICD-10-CM

## 2023-10-10 DIAGNOSIS — N18.32 ANEMIA DUE TO STAGE 3B CHRONIC KIDNEY DISEASE (H): ICD-10-CM

## 2023-10-10 LAB
ACANTHOCYTES BLD QL SMEAR: ABNORMAL
AUER BODIES BLD QL SMEAR: ABNORMAL
BASO STIPL BLD QL SMEAR: ABNORMAL
BITE CELLS BLD QL SMEAR: ABNORMAL
BLISTER CELLS BLD QL SMEAR: ABNORMAL
BURR CELLS BLD QL SMEAR: SLIGHT
DACRYOCYTES BLD QL SMEAR: ABNORMAL
ELLIPTOCYTES BLD QL SMEAR: ABNORMAL
ERYTHROCYTE [DISTWIDTH] IN BLOOD BY AUTOMATED COUNT: 15.4 % (ref 10–15)
FRAGMENTS BLD QL SMEAR: ABNORMAL
HCT VFR BLD AUTO: 28.9 % (ref 35–47)
HGB BLD-MCNC: 8.8 G/DL (ref 11.7–15.7)
HGB C CRYSTALS: ABNORMAL
HOWELL-JOLLY BOD BLD QL SMEAR: ABNORMAL
MCH RBC QN AUTO: 27.7 PG (ref 26.5–33)
MCHC RBC AUTO-ENTMCNC: 30.4 G/DL (ref 31.5–36.5)
MCV RBC AUTO: 91 FL (ref 78–100)
NEUTS HYPERSEG BLD QL SMEAR: ABNORMAL
PLAT MORPH BLD: ABNORMAL
PLATELET # BLD AUTO: 65 10E3/UL (ref 150–450)
POLYCHROMASIA BLD QL SMEAR: SLIGHT
RBC # BLD AUTO: 3.18 10E6/UL (ref 3.8–5.2)
RBC AGGLUT BLD QL: ABNORMAL
RBC MORPH BLD: ABNORMAL
ROULEAUX BLD QL SMEAR: ABNORMAL
SICKLE CELLS BLD QL SMEAR: ABNORMAL
SMUDGE CELLS BLD QL SMEAR: ABNORMAL
SPHEROCYTES BLD QL SMEAR: ABNORMAL
STOMATOCYTES BLD QL SMEAR: ABNORMAL
TARGETS BLD QL SMEAR: ABNORMAL
TOXIC GRANULES BLD QL SMEAR: ABNORMAL
VARIANT LYMPHS BLD QL SMEAR: ABNORMAL
WBC # BLD AUTO: 5.9 10E3/UL (ref 4–11)

## 2023-10-10 PROCEDURE — 85027 COMPLETE CBC AUTOMATED: CPT

## 2023-10-10 PROCEDURE — 36415 COLL VENOUS BLD VENIPUNCTURE: CPT

## 2023-10-10 PROCEDURE — 83550 IRON BINDING TEST: CPT

## 2023-10-10 PROCEDURE — 80069 RENAL FUNCTION PANEL: CPT

## 2023-10-10 PROCEDURE — 82043 UR ALBUMIN QUANTITATIVE: CPT

## 2023-10-10 PROCEDURE — 84156 ASSAY OF PROTEIN URINE: CPT

## 2023-10-10 PROCEDURE — 83970 ASSAY OF PARATHORMONE: CPT

## 2023-10-10 PROCEDURE — 82570 ASSAY OF URINE CREATININE: CPT

## 2023-10-10 PROCEDURE — 83540 ASSAY OF IRON: CPT

## 2023-10-10 RX ORDER — ASPIRIN 325 MG
325 TABLET ORAL ONCE
Status: CANCELLED | OUTPATIENT
Start: 2023-10-10 | End: 2023-10-10

## 2023-10-10 RX ORDER — POTASSIUM CHLORIDE 1500 MG/1
20 TABLET, EXTENDED RELEASE ORAL
Status: CANCELLED | OUTPATIENT
Start: 2023-10-10

## 2023-10-10 RX ORDER — SODIUM CHLORIDE 9 MG/ML
INJECTION, SOLUTION INTRAVENOUS CONTINUOUS
Status: CANCELLED | OUTPATIENT
Start: 2023-10-10

## 2023-10-10 RX ORDER — ASPIRIN 81 MG/1
243 TABLET, CHEWABLE ORAL ONCE
Status: CANCELLED | OUTPATIENT
Start: 2023-10-10

## 2023-10-10 RX ORDER — POTASSIUM CHLORIDE 1500 MG/1
40 TABLET, EXTENDED RELEASE ORAL
Status: CANCELLED | OUTPATIENT
Start: 2023-10-10

## 2023-10-10 RX ORDER — LIDOCAINE 40 MG/G
CREAM TOPICAL
Status: CANCELLED | OUTPATIENT
Start: 2023-10-10

## 2023-10-11 DIAGNOSIS — I10 ESSENTIAL HYPERTENSION: ICD-10-CM

## 2023-10-11 LAB
ALBUMIN MFR UR ELPH: 57.6 MG/DL
ALBUMIN SERPL BCG-MCNC: 4.3 G/DL (ref 3.5–5.2)
ANION GAP SERPL CALCULATED.3IONS-SCNC: 13 MMOL/L (ref 7–15)
BUN SERPL-MCNC: 41.1 MG/DL (ref 8–23)
CALCIUM SERPL-MCNC: 9.2 MG/DL (ref 8.8–10.2)
CHLORIDE SERPL-SCNC: 106 MMOL/L (ref 98–107)
CREAT SERPL-MCNC: 2.13 MG/DL (ref 0.51–0.95)
CREAT UR-MCNC: 85.4 MG/DL
CREAT UR-MCNC: 85.4 MG/DL
DEPRECATED HCO3 PLAS-SCNC: 22 MMOL/L (ref 22–29)
EGFRCR SERPLBLD CKD-EPI 2021: 22 ML/MIN/1.73M2
GLUCOSE SERPL-MCNC: 104 MG/DL (ref 70–99)
IRON BINDING CAPACITY (ROCHE): 371 UG/DL (ref 240–430)
IRON SATN MFR SERPL: 8 % (ref 15–46)
IRON SERPL-MCNC: 31 UG/DL (ref 37–145)
MICROALBUMIN UR-MCNC: 258 MG/L
MICROALBUMIN/CREAT UR: 302.11 MG/G CR (ref 0–25)
PHOSPHATE SERPL-MCNC: 3.8 MG/DL (ref 2.5–4.5)
POTASSIUM SERPL-SCNC: 4.4 MMOL/L (ref 3.4–5.3)
PROT/CREAT 24H UR: 0.67 MG/MG CR (ref 0–0.2)
PTH-INTACT SERPL-MCNC: 116 PG/ML (ref 15–65)
SODIUM SERPL-SCNC: 141 MMOL/L (ref 135–145)

## 2023-10-11 RX ORDER — METOPROLOL TARTRATE 100 MG
100 TABLET ORAL 2 TIMES DAILY
Qty: 180 TABLET | Refills: 0 | Status: SHIPPED | OUTPATIENT
Start: 2023-10-11 | End: 2024-01-06

## 2023-10-11 NOTE — TELEPHONE ENCOUNTER
metoprolol tartrate (LOPRESSOR) 100 MG tablet 180 tablet 0 7/6/2023         Last Office Visit : 8/9/23  Future Office visit:  11/15/23      BP Readings from Last 3 Encounters:   10/09/23 (!) 149/70   10/02/23 128/68   08/28/23 131/69

## 2023-10-12 DIAGNOSIS — I10 ESSENTIAL HYPERTENSION: ICD-10-CM

## 2023-10-12 RX ORDER — METOPROLOL TARTRATE 100 MG
TABLET ORAL
Qty: 180 TABLET | Refills: 0 | OUTPATIENT
Start: 2023-10-12

## 2023-10-13 PROBLEM — Z47.89 AFTERCARE FOLLOWING SURGERY OF THE MUSCULOSKELETAL SYSTEM: Status: ACTIVE | Noted: 2018-04-05

## 2023-10-13 PROBLEM — Z71.89 ACP (ADVANCE CARE PLANNING): Status: ACTIVE | Noted: 2018-03-20

## 2023-10-13 PROBLEM — N18.30 CHRONIC KIDNEY DISEASE, STAGE 3 (H): Status: ACTIVE | Noted: 2018-03-19

## 2023-10-13 PROBLEM — K21.9 GERD (GASTROESOPHAGEAL REFLUX DISEASE): Status: ACTIVE | Noted: 2018-03-19

## 2023-10-13 PROBLEM — E78.5 HYPERLIPIDEMIA: Status: ACTIVE | Noted: 2018-03-19

## 2023-10-13 PROBLEM — D62 POSTOPERATIVE ANEMIA DUE TO ACUTE BLOOD LOSS: Status: ACTIVE | Noted: 2018-03-22

## 2023-10-13 PROBLEM — S72.91XA: Status: ACTIVE | Noted: 2018-03-18

## 2023-10-13 PROBLEM — D64.9 CHRONIC ANEMIA: Status: ACTIVE | Noted: 2018-03-19

## 2023-10-13 PROBLEM — E87.6 HYPOKALEMIA: Status: ACTIVE | Noted: 2018-03-19

## 2023-10-13 PROBLEM — M17.11 OSTEOARTHRITIS OF RIGHT PATELLOFEMORAL JOINT: Status: ACTIVE | Noted: 2018-06-25

## 2023-10-13 PROBLEM — I95.1 ORTHOSTATIC HYPOTENSION: Status: ACTIVE | Noted: 2018-03-21

## 2023-10-16 ENCOUNTER — TELEPHONE (OUTPATIENT)
Dept: CARDIOLOGY | Facility: CLINIC | Age: 88
End: 2023-10-16
Payer: COMMERCIAL

## 2023-10-16 ENCOUNTER — PATIENT OUTREACH (OUTPATIENT)
Dept: NEPHROLOGY | Facility: CLINIC | Age: 88
End: 2023-10-16
Payer: COMMERCIAL

## 2023-10-16 DIAGNOSIS — N18.4 CKD (CHRONIC KIDNEY DISEASE) STAGE 4, GFR 15-29 ML/MIN (H): Primary | ICD-10-CM

## 2023-10-16 NOTE — TELEPHONE ENCOUNTER
Cath Lab Case Request/Order    Location: [address] Waiting Room    Procedure: Coronary Angio w/possible PCI    Procedure Date: 11/6/23    Patient Arrival Time: 11 AM    Procedure Time:  4th case to follow    Ordering Provider: Randa Ann    Performing Cardiologist: Dr. Robert Echevarria    Inpatient Bed Needed: No    Post-  Procedure [place] appointment scheduled (1 - 2 weeks): YES     Date: 11/20/23 @ 9 AM     Provider: Randa Ann    Communicated Patient Instructions:     NPO, nothing to eat 8 hours and drink 2 hours before arrival time: Yes     , need to arrange a ride home - unable to drive post- procedure: Yes     Adult at home, need a responsible adult to stay with patient 24 hours post- procedure: YES    Appointment was scheduled: Over the phone    Patient expressed understanding of above instructions and denied further questions at this time.    Carito Mehta, ARIS  _________________________________________________________________________  November 6, Monday  Pre-procedure instructions - Coronary Angiogram  Patient Education     Your arrival time is 11:00 a.m.  Location is [address] Waiting Room  Please plan on being at the hospital all day.  At any time, emergencies and/or urgent cases may come up which could delay the start of your procedure.     Pre-procedure instructions - Coronary Angiogram  Shower in the evening before or the morning of the procedure  No solid food for 8 hours prior and nothing to drink 2 hours prior to arrival time  You can take your morning medications (except for diabetic and blood thinners) with sips of water.  Take 325 mg of Asprin 24 hours prior to the procedure and the morning of procedure.   You will need to arrange a ride to drop you off and pick you up, as you will be unable to drive home.  Prior  to discharge you may be required to lay flat for approximately 2-4 hours in the recovery unit to ensure proper clotting of the artery.                 Anticoagulation Medication Instructions   apixaban (ELIQUIS) - Hold 48 hours prior to procedure  BEGIN Holding Eliquis on November 4. (NOTE:  Please take a full dose aspirin on November 4, 5, and November 6.)

## 2023-10-19 ENCOUNTER — TELEPHONE (OUTPATIENT)
Dept: NEPHROLOGY | Facility: CLINIC | Age: 88
End: 2023-10-19
Payer: COMMERCIAL

## 2023-10-19 NOTE — TELEPHONE ENCOUNTER
Patient called regarding a result note from Dr Martin. Left vm for patient to call back.  CAROL Graham Care Coordinator  Nephrology

## 2023-10-20 ENCOUNTER — PATIENT OUTREACH (OUTPATIENT)
Dept: NEPHROLOGY | Facility: CLINIC | Age: 88
End: 2023-10-20
Payer: COMMERCIAL

## 2023-10-20 NOTE — TELEPHONE ENCOUNTER
Spoke with patient regarding result note from Dr Martin.     Labs are relatively stable since increasing furosemide.  Your hemoglobin is a little low, we will followup with you on this.   Patient stated an understanding.  CAROL Graham Care Coordinator  Nephrology

## 2023-10-30 ENCOUNTER — CARE COORDINATION (OUTPATIENT)
Dept: CARDIOLOGY | Facility: CLINIC | Age: 88
End: 2023-10-30
Payer: COMMERCIAL

## 2023-10-31 ENCOUNTER — CARE COORDINATION (OUTPATIENT)
Dept: CARDIOLOGY | Facility: CLINIC | Age: 88
End: 2023-10-31
Payer: COMMERCIAL

## 2023-10-31 NOTE — PROGRESS NOTES
The following instructions were reviewed with Tessa.  Questions were answered.  --------------------------------------------------------------------------------------------------  Pre-procedure instructions - Coronary Angiogram  Patient Education     Your arrival time is November 6 at 11:00 a.m..  [place] [address] - [facility] Waiting Room  Please plan on being at the hospital all day.  At any time, emergencies and/or urgent cases may come up which could delay the start of your procedure.     Pre-procedure instructions - Coronary Angiogram  Shower in the evening before or the morning of the procedure  No solid food for 8 hours prior and nothing to drink 2 hours prior to arrival time  You can take your morning medications (except for diabetic and blood thinners) with sips of water.  Take 325 mg of Asprin 24 hours prior to the procedure and the morning of procedure.   You will need to arrange a ride to drop you off and pick you up, as you will be unable to drive home.  Prior to discharge you may be required to lay flat for approximately 2-4 hours in the recovery unit to ensure proper clotting of the artery.                 Anticoagulation Medication Instructions   apixaban (ELIQUIS) - Hold 48 hours prior to procedure  BEGIN HOLDING your Eliquis November 4.  On November 4, 5 and 6th, please take Aspirin 325 mg, by mouth, daily.     You will need to follow up with one of our cardiology APPs 1-2 weeks after your procedure. If you need help scheduling or rescheduling your appointment, please call [phone]

## 2023-11-06 ENCOUNTER — APPOINTMENT (OUTPATIENT)
Dept: MEDSURG UNIT | Facility: CLINIC | Age: 88
End: 2023-11-06
Attending: INTERNAL MEDICINE
Payer: COMMERCIAL

## 2023-11-06 ENCOUNTER — HOSPITAL ENCOUNTER (OUTPATIENT)
Facility: CLINIC | Age: 88
Discharge: HOME | End: 2023-11-06
Attending: INTERNAL MEDICINE | Admitting: INTERNAL MEDICINE
Payer: COMMERCIAL

## 2023-11-06 ENCOUNTER — APPOINTMENT (OUTPATIENT)
Dept: LAB | Facility: CLINIC | Age: 88
End: 2023-11-06
Attending: INTERNAL MEDICINE
Payer: COMMERCIAL

## 2023-11-06 VITALS
BODY MASS INDEX: 25.33 KG/M2 | DIASTOLIC BLOOD PRESSURE: 69 MMHG | TEMPERATURE: 97.8 F | HEART RATE: 73 BPM | WEIGHT: 158.95 LBS | SYSTOLIC BLOOD PRESSURE: 141 MMHG | OXYGEN SATURATION: 95 % | RESPIRATION RATE: 16 BRPM

## 2023-11-06 DIAGNOSIS — I25.118 CORONARY ARTERY DISEASE OF NATIVE ARTERY OF NATIVE HEART WITH STABLE ANGINA PECTORIS (H): ICD-10-CM

## 2023-11-06 LAB
ACANTHOCYTES BLD QL SMEAR: ABNORMAL
ACT BLD: 343 SECONDS (ref 74–150)
ANION GAP SERPL CALCULATED.3IONS-SCNC: 12 MMOL/L (ref 7–15)
AUER BODIES BLD QL SMEAR: ABNORMAL
BASO STIPL BLD QL SMEAR: ABNORMAL
BITE CELLS BLD QL SMEAR: ABNORMAL
BLISTER CELLS BLD QL SMEAR: ABNORMAL
BUN SERPL-MCNC: 44.3 MG/DL (ref 8–23)
BURR CELLS BLD QL SMEAR: ABNORMAL
CALCIUM SERPL-MCNC: 9.3 MG/DL (ref 8.8–10.2)
CHLORIDE SERPL-SCNC: 111 MMOL/L (ref 98–107)
CREAT SERPL-MCNC: 2.23 MG/DL (ref 0.51–0.95)
DACRYOCYTES BLD QL SMEAR: ABNORMAL
DEPRECATED HCO3 PLAS-SCNC: 20 MMOL/L (ref 22–29)
EGFRCR SERPLBLD CKD-EPI 2021: 21 ML/MIN/1.73M2
ELLIPTOCYTES BLD QL SMEAR: ABNORMAL
ERYTHROCYTE [DISTWIDTH] IN BLOOD BY AUTOMATED COUNT: 17.4 % (ref 10–15)
FRAGMENTS BLD QL SMEAR: ABNORMAL
GLUCOSE SERPL-MCNC: 92 MG/DL (ref 70–99)
HCT VFR BLD AUTO: 32 % (ref 35–47)
HGB BLD-MCNC: 9.2 G/DL (ref 11.7–15.7)
HGB C CRYSTALS: ABNORMAL
HOWELL-JOLLY BOD BLD QL SMEAR: ABNORMAL
INR PPP: 1.22 (ref 0.85–1.15)
MCH RBC QN AUTO: 27.4 PG (ref 26.5–33)
MCHC RBC AUTO-ENTMCNC: 28.8 G/DL (ref 31.5–36.5)
MCV RBC AUTO: 95 FL (ref 78–100)
NEUTS HYPERSEG BLD QL SMEAR: ABNORMAL
PLAT MORPH BLD: ABNORMAL
PLATELET # BLD AUTO: 95 10E3/UL (ref 150–450)
POLYCHROMASIA BLD QL SMEAR: SLIGHT
POTASSIUM SERPL-SCNC: 5.1 MMOL/L (ref 3.4–5.3)
RBC # BLD AUTO: 3.36 10E6/UL (ref 3.8–5.2)
RBC AGGLUT BLD QL: ABNORMAL
RBC MORPH BLD: ABNORMAL
ROULEAUX BLD QL SMEAR: ABNORMAL
SICKLE CELLS BLD QL SMEAR: ABNORMAL
SMUDGE CELLS BLD QL SMEAR: ABNORMAL
SODIUM SERPL-SCNC: 143 MMOL/L (ref 135–145)
SPHEROCYTES BLD QL SMEAR: ABNORMAL
STOMATOCYTES BLD QL SMEAR: ABNORMAL
TARGETS BLD QL SMEAR: ABNORMAL
TOXIC GRANULES BLD QL SMEAR: ABNORMAL
VARIANT LYMPHS BLD QL SMEAR: ABNORMAL
WBC # BLD AUTO: 6.4 10E3/UL (ref 4–11)

## 2023-11-06 PROCEDURE — C1887 CATHETER, GUIDING: HCPCS | Performed by: INTERNAL MEDICINE

## 2023-11-06 PROCEDURE — C1760 CLOSURE DEV, VASC: HCPCS | Performed by: INTERNAL MEDICINE

## 2023-11-06 PROCEDURE — 93010 ELECTROCARDIOGRAM REPORT: CPT | Performed by: INTERNAL MEDICINE

## 2023-11-06 PROCEDURE — 85027 COMPLETE CBC AUTOMATED: CPT | Performed by: NURSE PRACTITIONER

## 2023-11-06 PROCEDURE — 92928 PRQ TCAT PLMT NTRAC ST 1 LES: CPT | Mod: LD | Performed by: INTERNAL MEDICINE

## 2023-11-06 PROCEDURE — 85610 PROTHROMBIN TIME: CPT | Performed by: NURSE PRACTITIONER

## 2023-11-06 PROCEDURE — C1769 GUIDE WIRE: HCPCS | Performed by: INTERNAL MEDICINE

## 2023-11-06 PROCEDURE — 93454 CORONARY ARTERY ANGIO S&I: CPT

## 2023-11-06 PROCEDURE — C1894 INTRO/SHEATH, NON-LASER: HCPCS | Performed by: INTERNAL MEDICINE

## 2023-11-06 PROCEDURE — 250N000009 HC RX 250: Performed by: INTERNAL MEDICINE

## 2023-11-06 PROCEDURE — 250N000011 HC RX IP 250 OP 636: Performed by: INTERNAL MEDICINE

## 2023-11-06 PROCEDURE — C1874 STENT, COATED/COV W/DEL SYS: HCPCS | Performed by: INTERNAL MEDICINE

## 2023-11-06 PROCEDURE — 99152 MOD SED SAME PHYS/QHP 5/>YRS: CPT | Performed by: INTERNAL MEDICINE

## 2023-11-06 PROCEDURE — 99152 MOD SED SAME PHYS/QHP 5/>YRS: CPT | Mod: GC | Performed by: INTERNAL MEDICINE

## 2023-11-06 PROCEDURE — 999N000054 HC STATISTIC EKG NON-CHARGEABLE

## 2023-11-06 PROCEDURE — 272N000001 HC OR GENERAL SUPPLY STERILE: Performed by: INTERNAL MEDICINE

## 2023-11-06 PROCEDURE — 82310 ASSAY OF CALCIUM: CPT | Performed by: NURSE PRACTITIONER

## 2023-11-06 PROCEDURE — 999N000142 HC STATISTIC PROCEDURE PREP ONLY

## 2023-11-06 PROCEDURE — 93005 ELECTROCARDIOGRAM TRACING: CPT

## 2023-11-06 PROCEDURE — 258N000003 HC RX IP 258 OP 636: Performed by: NURSE PRACTITIONER

## 2023-11-06 PROCEDURE — 999N000134 HC STATISTIC PP CARE STAGE 3

## 2023-11-06 PROCEDURE — 93454 CORONARY ARTERY ANGIO S&I: CPT | Mod: 26 | Performed by: INTERNAL MEDICINE

## 2023-11-06 PROCEDURE — 250N000011 HC RX IP 250 OP 636: Mod: JZ | Performed by: INTERNAL MEDICINE

## 2023-11-06 PROCEDURE — 85347 COAGULATION TIME ACTIVATED: CPT

## 2023-11-06 PROCEDURE — C9600 PERC DRUG-EL COR STENT SING: HCPCS | Performed by: INTERNAL MEDICINE

## 2023-11-06 PROCEDURE — 36415 COLL VENOUS BLD VENIPUNCTURE: CPT | Performed by: NURSE PRACTITIONER

## 2023-11-06 DEVICE — CLOSURE ANGIOSEAL 6FR 610130: Type: IMPLANTABLE DEVICE | Status: FUNCTIONAL

## 2023-11-06 DEVICE — STENT CORONARY DES SYNERGY XD MR US 2.50X12MM H7493941812250: Type: IMPLANTABLE DEVICE | Status: FUNCTIONAL

## 2023-11-06 RX ORDER — OXYCODONE HYDROCHLORIDE 5 MG/1
5 TABLET ORAL EVERY 4 HOURS PRN
Status: DISCONTINUED | OUTPATIENT
Start: 2023-11-06 | End: 2023-11-06 | Stop reason: HOSPADM

## 2023-11-06 RX ORDER — ASPIRIN 325 MG
325 TABLET ORAL ONCE
Status: COMPLETED | OUTPATIENT
Start: 2023-11-06 | End: 2023-11-06

## 2023-11-06 RX ORDER — LIDOCAINE 40 MG/G
CREAM TOPICAL
Status: DISCONTINUED | OUTPATIENT
Start: 2023-11-06 | End: 2023-11-06 | Stop reason: HOSPADM

## 2023-11-06 RX ORDER — POTASSIUM CHLORIDE 750 MG/1
20 TABLET, EXTENDED RELEASE ORAL
Status: DISCONTINUED | OUTPATIENT
Start: 2023-11-06 | End: 2023-11-06 | Stop reason: HOSPADM

## 2023-11-06 RX ORDER — HYDRALAZINE HYDROCHLORIDE 20 MG/ML
10 INJECTION INTRAMUSCULAR; INTRAVENOUS EVERY 4 HOURS PRN
Status: DISCONTINUED | OUTPATIENT
Start: 2023-11-06 | End: 2023-11-06 | Stop reason: HOSPADM

## 2023-11-06 RX ORDER — ASPIRIN 81 MG/1
243 TABLET, CHEWABLE ORAL ONCE
Status: COMPLETED | OUTPATIENT
Start: 2023-11-06 | End: 2023-11-06

## 2023-11-06 RX ORDER — ASPIRIN 325 MG
325 TABLET, DELAYED RELEASE (ENTERIC COATED) ORAL DAILY
COMMUNITY
End: 2024-03-17

## 2023-11-06 RX ORDER — NALOXONE HYDROCHLORIDE 0.4 MG/ML
0.2 INJECTION, SOLUTION INTRAMUSCULAR; INTRAVENOUS; SUBCUTANEOUS
Status: DISCONTINUED | OUTPATIENT
Start: 2023-11-06 | End: 2023-11-06 | Stop reason: HOSPADM

## 2023-11-06 RX ORDER — HEPARIN SODIUM 1000 [USP'U]/ML
INJECTION, SOLUTION INTRAVENOUS; SUBCUTANEOUS
Status: DISCONTINUED | OUTPATIENT
Start: 2023-11-06 | End: 2023-11-06 | Stop reason: HOSPADM

## 2023-11-06 RX ORDER — ONDANSETRON 2 MG/ML
4 INJECTION INTRAMUSCULAR; INTRAVENOUS EVERY 6 HOURS PRN
Status: DISCONTINUED | OUTPATIENT
Start: 2023-11-06 | End: 2023-11-06 | Stop reason: HOSPADM

## 2023-11-06 RX ORDER — NALOXONE HYDROCHLORIDE 0.4 MG/ML
0.4 INJECTION, SOLUTION INTRAMUSCULAR; INTRAVENOUS; SUBCUTANEOUS
Status: DISCONTINUED | OUTPATIENT
Start: 2023-11-06 | End: 2023-11-06 | Stop reason: HOSPADM

## 2023-11-06 RX ORDER — FLUMAZENIL 0.1 MG/ML
0.2 INJECTION, SOLUTION INTRAVENOUS
Status: DISCONTINUED | OUTPATIENT
Start: 2023-11-06 | End: 2023-11-06 | Stop reason: HOSPADM

## 2023-11-06 RX ORDER — ONDANSETRON 4 MG/1
4 TABLET, ORALLY DISINTEGRATING ORAL EVERY 6 HOURS PRN
Status: DISCONTINUED | OUTPATIENT
Start: 2023-11-06 | End: 2023-11-06 | Stop reason: HOSPADM

## 2023-11-06 RX ORDER — POTASSIUM CHLORIDE 750 MG/1
40 TABLET, EXTENDED RELEASE ORAL
Status: DISCONTINUED | OUTPATIENT
Start: 2023-11-06 | End: 2023-11-06 | Stop reason: HOSPADM

## 2023-11-06 RX ORDER — NITROGLYCERIN 0.4 MG/1
0.4 TABLET SUBLINGUAL EVERY 5 MIN PRN
Status: DISCONTINUED | OUTPATIENT
Start: 2023-11-06 | End: 2023-11-06 | Stop reason: HOSPADM

## 2023-11-06 RX ORDER — OXYCODONE HYDROCHLORIDE 10 MG/1
10 TABLET ORAL EVERY 4 HOURS PRN
Status: DISCONTINUED | OUTPATIENT
Start: 2023-11-06 | End: 2023-11-06 | Stop reason: HOSPADM

## 2023-11-06 RX ORDER — SODIUM CHLORIDE 9 MG/ML
INJECTION, SOLUTION INTRAVENOUS CONTINUOUS
Status: DISCONTINUED | OUTPATIENT
Start: 2023-11-06 | End: 2023-11-06 | Stop reason: HOSPADM

## 2023-11-06 RX ORDER — ASPIRIN 81 MG/1
81 TABLET ORAL DAILY
Qty: 30 TABLET | Refills: 3 | Status: SHIPPED | OUTPATIENT
Start: 2023-11-07 | End: 2023-11-20

## 2023-11-06 RX ORDER — ASPIRIN 81 MG/1
81 TABLET ORAL DAILY
Status: DISCONTINUED | OUTPATIENT
Start: 2023-11-07 | End: 2023-11-06 | Stop reason: HOSPADM

## 2023-11-06 RX ORDER — FENTANYL CITRATE 50 UG/ML
INJECTION, SOLUTION INTRAMUSCULAR; INTRAVENOUS
Status: DISCONTINUED | OUTPATIENT
Start: 2023-11-06 | End: 2023-11-06 | Stop reason: HOSPADM

## 2023-11-06 RX ORDER — FENTANYL CITRATE 50 UG/ML
25 INJECTION, SOLUTION INTRAMUSCULAR; INTRAVENOUS
Status: DISCONTINUED | OUTPATIENT
Start: 2023-11-06 | End: 2023-11-06 | Stop reason: HOSPADM

## 2023-11-06 RX ORDER — ATROPINE SULFATE 0.1 MG/ML
0.5 INJECTION INTRAVENOUS
Status: DISCONTINUED | OUTPATIENT
Start: 2023-11-06 | End: 2023-11-06 | Stop reason: HOSPADM

## 2023-11-06 RX ORDER — METOPROLOL TARTRATE 1 MG/ML
5 INJECTION, SOLUTION INTRAVENOUS
Status: DISCONTINUED | OUTPATIENT
Start: 2023-11-06 | End: 2023-11-06 | Stop reason: HOSPADM

## 2023-11-06 RX ORDER — ASPIRIN 81 MG/1
81 TABLET, CHEWABLE ORAL ONCE
Status: DISCONTINUED | OUTPATIENT
Start: 2023-11-06 | End: 2023-11-06 | Stop reason: HOSPADM

## 2023-11-06 RX ORDER — IOPAMIDOL 755 MG/ML
INJECTION, SOLUTION INTRAVASCULAR
Status: DISCONTINUED | OUTPATIENT
Start: 2023-11-06 | End: 2023-11-06 | Stop reason: HOSPADM

## 2023-11-06 RX ADMIN — SODIUM CHLORIDE: 9 INJECTION, SOLUTION INTRAVENOUS at 12:14

## 2023-11-06 ASSESSMENT — ACTIVITIES OF DAILY LIVING (ADL)
ADLS_ACUITY_SCORE: 37

## 2023-11-06 NOTE — DISCHARGE INSTRUCTIONS
Going Home after Coronary Angioplasty or Stent Placement  For 24 hours:        Have an adult stay with you for 24 hours.        Relax and take it easy.        Drink plenty of fluids.        You may eat your normal diet, unless your doctor tells you otherwise.        Do NOT make any important or legal decisions.        Do NOT drive or operate machines at home or at work.        Do NOT drink alcohol.   Do NOT smoke.     Medicines:        If you have begun Plavix (clopidogrel), Effient (prasugrel), or Brilinta (ticagrelor), do not stop taking it until you talk to your heart doctor (cardiologist).        If you have stopped any other medicines, check with your nurse or provider about when to restart them.    Care of groin site:        Remove the Band-Aid after 24 hours. If there is minor oozing, apply another Band-aid and remove it after 12 hours.         Do NOT take a bath, or use a hot tub or pool for at least 3 days. You may shower tomorrow.        It is normal to have a small bruise or lump at the site.        Do not scrub the site.        Do not use lotion or powder near the puncture site for 3 days.        For the first 2 days: Do not stoop or squat. When you cough, sneeze or move your bowels, hold your hand over the puncture site and press gently.        Do not lift more than 10 pounds for at least 7-10 days.        For 2 days, do NOT have sex or do any heavy exercise.     If you start bleeding from the site in your groin:  Lie down flat and press firmly on the site.  Call your physician immediately, or, come to the emergency room.    Call 911 right away if you have bleeding that is heavy or does not stop.     Call your doctor if:        You have a large or growing hard lump around the site.        The site is red, swollen, hot or tender.        Blood or fluid is draining from the site.        You have chills or a fever greater than 101 F (38 C).        Your leg turns bluish, feels numb or cool.        You have  hives, a rash or unusual itching.       [facility] [place]:   [phone] (7 days a week)      Cardiology Fellow on call (24 hours per day) at [facility] [place]:   [phone] (ask for Cardiology Fellow on call)

## 2023-11-06 NOTE — PROGRESS NOTES
Arrived to unit 3C, via litter from the cardiac cath lab s/p PCI. Right groin access closed with Angio-Seal device, 2 hours of bedrest until 1640. She is alert and oriented x 4 and able to make needs known.  is at bedside. Continue with plan of care.

## 2023-11-06 NOTE — Clinical Note
dry, intact, no bleeding and no hematoma. 6fr RFA sheath removed angio-seal closure used . Tegaderm drsg

## 2023-11-06 NOTE — PROGRESS NOTES
Arrived from home for a coronary angiogram.  VSS.  Denies pain.  PIV placed.  H&P current.  Needs consent.  Will be ready for procedure when consent obtained.

## 2023-11-06 NOTE — Clinical Note
Stent deployed in the middle left anterior descending. Max pressure = 12 chandan. Total duration = 6 seconds.

## 2023-11-07 LAB
ATRIAL RATE - MUSE: 72 BPM
ATRIAL RATE - MUSE: 79 BPM
DIASTOLIC BLOOD PRESSURE - MUSE: NORMAL MMHG
DIASTOLIC BLOOD PRESSURE - MUSE: NORMAL MMHG
INTERPRETATION ECG - MUSE: NORMAL
INTERPRETATION ECG - MUSE: NORMAL
P AXIS - MUSE: 78 DEGREES
P AXIS - MUSE: 97 DEGREES
PR INTERVAL - MUSE: 210 MS
PR INTERVAL - MUSE: 290 MS
QRS DURATION - MUSE: 84 MS
QRS DURATION - MUSE: 88 MS
QT - MUSE: 374 MS
QT - MUSE: 406 MS
QTC - MUSE: 428 MS
QTC - MUSE: 444 MS
R AXIS - MUSE: 23 DEGREES
R AXIS - MUSE: 25 DEGREES
SYSTOLIC BLOOD PRESSURE - MUSE: NORMAL MMHG
SYSTOLIC BLOOD PRESSURE - MUSE: NORMAL MMHG
T AXIS - MUSE: 11 DEGREES
T AXIS - MUSE: 20 DEGREES
VENTRICULAR RATE- MUSE: 72 BPM
VENTRICULAR RATE- MUSE: 79 BPM

## 2023-11-15 ENCOUNTER — ANCILLARY PROCEDURE (OUTPATIENT)
Dept: GENERAL RADIOLOGY | Facility: CLINIC | Age: 88
End: 2023-11-15
Attending: FAMILY MEDICINE
Payer: COMMERCIAL

## 2023-11-15 ENCOUNTER — OFFICE VISIT (OUTPATIENT)
Dept: FAMILY MEDICINE | Facility: CLINIC | Age: 88
End: 2023-11-15
Payer: COMMERCIAL

## 2023-11-15 VITALS
OXYGEN SATURATION: 100 % | SYSTOLIC BLOOD PRESSURE: 124 MMHG | HEART RATE: 80 BPM | DIASTOLIC BLOOD PRESSURE: 66 MMHG | BODY MASS INDEX: 25.81 KG/M2 | WEIGHT: 160.6 LBS | HEIGHT: 66 IN

## 2023-11-15 DIAGNOSIS — I25.118 CORONARY ARTERY DISEASE OF NATIVE ARTERY OF NATIVE HEART WITH STABLE ANGINA PECTORIS (H): Primary | ICD-10-CM

## 2023-11-15 DIAGNOSIS — M54.9 UPPER BACK PAIN: ICD-10-CM

## 2023-11-15 DIAGNOSIS — Z23 ENCOUNTER FOR IMMUNIZATION: ICD-10-CM

## 2023-11-15 PROCEDURE — 72072 X-RAY EXAM THORAC SPINE 3VWS: CPT | Performed by: STUDENT IN AN ORGANIZED HEALTH CARE EDUCATION/TRAINING PROGRAM

## 2023-11-15 PROCEDURE — G0008 ADMIN INFLUENZA VIRUS VAC: HCPCS | Performed by: FAMILY MEDICINE

## 2023-11-15 PROCEDURE — 99214 OFFICE O/P EST MOD 30 MIN: CPT | Mod: 25 | Performed by: FAMILY MEDICINE

## 2023-11-15 PROCEDURE — 90662 IIV NO PRSV INCREASED AG IM: CPT | Performed by: FAMILY MEDICINE

## 2023-11-15 RX ORDER — RESPIRATORY SYNCYTIAL VIRUS VACCINE 120MCG/0.5
0.5 KIT INTRAMUSCULAR ONCE
Qty: 1 EACH | Refills: 0 | Status: CANCELLED | OUTPATIENT
Start: 2023-11-15 | End: 2023-11-15

## 2023-11-15 ASSESSMENT — ASTHMA QUESTIONNAIRES
QUESTION_3 LAST FOUR WEEKS HOW OFTEN DID YOUR ASTHMA SYMPTOMS (WHEEZING, COUGHING, SHORTNESS OF BREATH, CHEST TIGHTNESS OR PAIN) WAKE YOU UP AT NIGHT OR EARLIER THAN USUAL IN THE MORNING: NOT AT ALL
QUESTION_5 LAST FOUR WEEKS HOW WOULD YOU RATE YOUR ASTHMA CONTROL: COMPLETELY CONTROLLED
QUESTION_4 LAST FOUR WEEKS HOW OFTEN HAVE YOU USED YOUR RESCUE INHALER OR NEBULIZER MEDICATION (SUCH AS ALBUTEROL): NOT AT ALL
QUESTION_1 LAST FOUR WEEKS HOW MUCH OF THE TIME DID YOUR ASTHMA KEEP YOU FROM GETTING AS MUCH DONE AT WORK, SCHOOL OR AT HOME: NONE OF THE TIME
ACT_TOTALSCORE: 25
ACT_TOTALSCORE: 25
QUESTION_2 LAST FOUR WEEKS HOW OFTEN HAVE YOU HAD SHORTNESS OF BREATH: NOT AT ALL

## 2023-11-15 NOTE — PROGRESS NOTES
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    Pedro Gomez MD  [facility] [place]    Lashell Zarate is a 88 year old, presenting for the following health issues:  Follow Up (Pt would like to discuss meds, pain between the shoulders on back, and recent angioplasty)      11/15/2023    12:24 PM   Additional Questions   Roomed by Alia Caban   Accompanied by N/A       Past Medical History:   Diagnosis Date    CAD (coronary artery disease)     CAD s/p PCI+BMS to MRCA 10/2002, PCI to mLCX 2/2003, PCI+DESx2 to pLAD 11/2007, PCI+DESx1 to dRCA 12/2007, PCI+ALVAREZ to RPDA 7/2013; on indefinite DAPT  10/27/2002    10/27/2002: AMI s/p PCI+BMS (3.5x18mm Bx velocity) to mRCA 2/5/2003: Back pain; PCI+stent to mLCX c/b distal embolization/slow flow 4/11/2003: Unstable angina; PCI+stent (Hepacoat velocity) to mLAD 11/27/2007: PCI+DESx2 to pLAD (IVUS w/ calcification of LMCA and ulcerated plaque pLAD, 80% dRCA; also had 80% dLCX, too small to stent) 12/11/2007: Staged PCI. PCI+DESx1 (3.5x13mm Cypher) to dRCA (indefinite DAPT recommended at this time) 6/27/2008: Angina. mLCX stent 70% ISR. LAD and RCA stents patent. Myocardium at risk from LCX felt to be small, medical management preferred to PCI. 11/10/2009: Angina. Findings unchanged from 6/27/2008, FFR LAD 0.90. Medical management recommended. 7/23/2013: Unstable angina; PCI+ALVAREZ to mPDA. Diagnostic findings: LMCA 40% distal. LAD:  pLAD stents patent mLAD 30% ISR dLAD 50% diffuse, D2 diffuse disease. LCX 80% mid ISR. RCA diffuse <30% mid, RPLAs diffuse disease, RPDA 100% occlusion.      Cardiac Pacemaker- Medtronic, dual chamber- NOT dependant 11/28/2007    CKD (chronic kidney disease), stage IV (H)     Hyperlipidemia LDL goal <70     Hypertension     Stented coronary artery 10-    RCA    Stented coronary artery 2-5-2003    LCx    Stented coronary artery 4-    LAD    Stented coronary artery 11-    LAD    Stented coronary artery 12-    RCA    Transient complete heart block (H) 11/28/2007     Past Surgical History:   Procedure Laterality Date    CHOLECYSTECTOMY      CV CORONARY ANGIOGRAM N/A 6/17/2022    Procedure: Coronary Angiogram;  Surgeon: Constantine Macias MD;  Location: [facility] CARDIAC CATH LAB    CV CORONARY ANGIOGRAM N/A 7/17/2023    Procedure: Coronary Angiogram;  Surgeon: Constantine Macias MD;  Location: [facility] CARDIAC CATH LAB    CV PCI N/A 6/17/2022    Procedure: Percutaneous Coronary Intervention;  Surgeon: Constantine Macias MD;  Location: [facility] CARDIAC CATH LAB    CV PCI N/A 7/17/2023    Procedure: Percutaneous Coronary Intervention;  Surgeon: Constantine Macias MD;  Location: [facility] CARDIAC CATH LAB    CV RIGHT HEART CATH MEASUREMENTS RECORDED N/A 6/17/2022    Procedure: Right Heart Catheterization;  Surgeon: Constantine Macias MD;  Location: [facility] CARDIAC CATH LAB    EP PACEMAKER N/A 10/15/2019    Procedure: EP PACEMAKER;  Surgeon: Anthony Zhu MD;  Location: [facility] CARDIAC CATH LAB    ESOPHAGOSCOPY, GASTROSCOPY, DUODENOSCOPY (EGD), COMBINED N/A 7/20/2023    Procedure: Esophagoscopy, gastroscopy, duodenoscopy (EGD), combined;  Surgeon: Bekah Dash DO;  Location:  GI    HC PPM INSERTION NEW/REPLACEMENT W/ ATRIAL&VENTRICULAR LEAD  11-    HYSTERECTOMY         HPI   Pt presents self c/o with mid upper back dull pain mostly in the afternoon while  sitting- horizontal. Started 2 months ago, not associated with trauma or fall. She has been utilizing heating pad and tylenol with good results. Will obtain x-ray and refer to spine clinic.    2. Ok for Flu today in the clinic. Declined Covid, discussed RSV, will do at the drugstore.  3. Recommended pt to take Aspirin 81 mg until seen by cardiology next week. Recently prescribed 325 mg at the hospital and not sure if needs both be continued. Send inbasket msg to cardiology team as well.    Past Medical History:   Diagnosis Date    CAD (coronary artery disease)     CAD s/p PCI+BMS to MRCA 10/2002, PCI to mLCX 2/2003, PCI+DESx2 to pLAD 11/2007, PCI+DESx1 to dRCA 12/2007, PCI+ALVAREZ to RPDA 7/2013; on indefinite DAPT  10/27/2002    10/27/2002: AMI s/p PCI+BMS (3.5x18mm Bx velocity) to mRCA 2/5/2003: Back pain; PCI+stent to mLCX c/b distal embolization/slow flow 4/11/2003: Unstable angina; PCI+stent (Hepacoat velocity) to mLAD 11/27/2007: PCI+DESx2 to pLAD (IVUS w/ calcification of LMCA and ulcerated plaque pLAD, 80% dRCA; also had 80% dLCX, too small to stent) 12/11/2007: Staged PCI. PCI+DESx1 (3.5x13mm Cypher) to dRCA (indefinite DAPT recommended at this time) 6/27/2008: Angina. mLCX stent 70% ISR. LAD and RCA stents patent. Myocardium at risk from LCX felt to be small, medical management preferred to PCI. 11/10/2009: Angina. Findings unchanged from 6/27/2008, FFR LAD 0.90. Medical management recommended. 7/23/2013: Unstable angina; PCI+ALVAREZ to mPDA. Diagnostic findings: LMCA 40% distal. LAD: pLAD stents patent mLAD 30% ISR dLAD 50% diffuse, D2 diffuse disease. LCX 80% mid ISR. RCA diffuse <30% mid, RPLAs diffuse disease, RPDA 100% occlusion.      Cardiac Pacemaker- Medtronic, dual chamber- NOT dependant 11/28/2007    CKD (chronic kidney disease), stage IV (H)     Hyperlipidemia LDL goal <70     Hypertension     Stented coronary artery 10-    RCA    Stented coronary artery 2-5-2003    LCx    Stented coronary artery  4-    LAD    Stented coronary artery 11-    LAD    Stented coronary artery 12-    RCA    Transient complete heart block (H) 11/28/2007     Past Surgical History:   Procedure Laterality Date    CHOLECYSTECTOMY      CV CORONARY ANGIOGRAM N/A 6/17/2022    Procedure: Coronary Angiogram;  Surgeon: Constantine Macias MD;  Location:  HEART CARDIAC CATH LAB    CV CORONARY ANGIOGRAM N/A 7/17/2023    Procedure: Coronary Angiogram;  Surgeon: Constantine Macias MD;  Location: [facility] CARDIAC CATH LAB    CV PCI N/A 6/17/2022    Procedure: Percutaneous Coronary Intervention;  Surgeon: Constantine Macias MD;  Location: [facility] CARDIAC CATH LAB    CV PCI N/A 7/17/2023    Procedure: Percutaneous Coronary Intervention;  Surgeon: Constantine Macias MD;  Location:  HEART CARDIAC CATH LAB    CV RIGHT HEART CATH MEASUREMENTS RECORDED N/A 6/17/2022    Procedure: Right Heart Catheterization;  Surgeon: Constantine Macias MD;  Location:  HEART CARDIAC CATH LAB    EP PACEMAKER N/A 10/15/2019    Procedure: EP PACEMAKER;  Surgeon: Anthony Zhu MD;  Location:  HEART CARDIAC CATH LAB    ESOPHAGOSCOPY, GASTROSCOPY, DUODENOSCOPY (EGD), COMBINED N/A 7/20/2023    Procedure: Esophagoscopy, gastroscopy, duodenoscopy (EGD), combined;  Surgeon: Bekah Dash DO;  Location:  GI    HC PPM INSERTION NEW/REPLACEMENT W/ ATRIAL&VENTRICULAR LEAD  11-    HYSTERECTOMY       Current Outpatient Medications   Medication    allopurinol (ZYLOPRIM) 100 MG tablet    amLODIPine (NORVASC) 10 MG tablet    apixaban ANTICOAGULANT (ELIQUIS) 2.5 MG tablet    aspirin (ASA) 325 MG EC tablet    budesonide (PULMICORT) 0.5 MG/2ML neb solution    clopidogrel (PLAVIX) 75 MG tablet    cyanocolbalamin (VITAMIN B-12) 1000 MCG tablet    ferrous sulfate (FEROSUL) 325 (65 Fe) MG tablet    fluticasone-salmeterol (ADVAIR) 250-50 MCG/ACT inhaler    furosemide (LASIX) 40 MG tablet    hydrALAZINE (APRESOLINE) 10 MG tablet  "   isosorbide mononitrate (ISMO/MONOKET) 20 MG tablet    metoprolol tartrate (LOPRESSOR) 100 MG tablet    Multiple Vitamins-Minerals (PRESERVISION AREDS 2+MULTI VIT PO)    omeprazole (PRILOSEC) 40 MG DR capsule    potassium chloride ER (K-TAB/KLOR-CON) 10 MEQ CR tablet    rosuvastatin (CRESTOR) 20 MG tablet    timolol maleate (TIMOPTIC) 0.5 % ophthalmic solution    VITAMIN D3 25 MCG (1000 UT) tablet    aspirin 81 MG EC tablet     No current facility-administered medications for this visit.     Allergies   Allergen Reactions    Ciprofloxacin Rash    Codeine Sulfate Itching    Shrimp Swelling    Bactrim [Sulfamethoxazole W/Trimethoprim]      Patient unable to recall    Biaxin [Clarithromycin]     Chlorthalidone Nausea and Vomiting    Clonidine     Darvon [Propoxyphene Hcl]     Dilaudid [Hydromorphone] Visual Disturbance and Hallucination    Gabapentin Fatigue and Confusion     \"felt drunk\"    Indomethacin     Levaquin [Levofloxacin Hemihydrate]     Morphine Sulfate     Percocet [Oxycodone-Acetaminophen] Hallucination    Pregabalin Itching and Fatigue    Simvastatin Palpitations     Muscle weakness, leg cramping      Spironolactone      Dehydrated      Terazosin            Review of Systems   Constitutional, HEENT, cardiovascular, pulmonary, gi and gu (positive for incontinence) systems are negative, except as otherwise noted.      Objective    /66 (BP Location: Right arm, Patient Position: Sitting, Cuff Size: Adult Regular)   Pulse 80   Ht 1.687 m (5' 6.42\")   Wt 72.8 kg (160 lb 9.6 oz)   SpO2 100%   BMI 25.60 kg/m    Body mass index is 25.6 kg/m .    Physical Exam     GENERAL: healthy, alert and no distress  RESP: lungs clear to auscultation - no rales, rhonchi or wheezes  CV: regular rate and rhythm, normal S1 S2, no S3 or S4, no murmur, click or rub, no peripheral edema and peripheral pulses strong  ABDOMEN: soft, nontender, no hepatosplenomegaly, no masses and bowel sounds normal  MS: no gross " musculoskeletal defects noted, no edema  PSYCH: mentation appears normal, affect normal/bright    Carleen Blanton RN, FNP student. Above plan and assessment discussed with the preceptor.     I saw the patient with the student, I agree with her note, the history physical assessment and plan are mine  Pedro Gomez MD

## 2023-11-15 NOTE — PROGRESS NOTES
Tessa is a 88 year old that presents in clinic today for the following:     Chief Complaint   Patient presents with    Follow Up     Pt would like to discuss meds, pain between the shoulders on back, and recent angioplasty           11/15/2023    12:24 PM   Additional Questions   Roomed by Alia Caban   Accompanied by N/A       Screenings as of today     ACT TOTAL SCORE (Goal Greater than or Equal to 20) 25        Alia Caban at 12:33 PM on 11/15/2023

## 2023-11-19 NOTE — PROGRESS NOTES
Chief complaint: LAD PCI follow-up    HPI:   Tessa Mansfield is a 88 year old female patient of Dr. Santoyo with extensive CAD history as detailed below (PCI to all 3 vessels at various times from 7392-8939) on indefinite dual antiplatelet therapy and sick sinus syndrome s/p dual-chamber PPM, device detected atrial fibrillation now on AC, CKD, last evaluated 10/2023 after RCA PCI, continued to experience dyspnea and underwent LAD PCI 11/6/23.     12/10/19:  She underwent pacemaker generator change 10/15/19 due to her prior generator reaching LLOA (new device: Medtronic [id number] Ida XT DR MRI dual chamber PPM) without event.      She reports volume status has been about stable and that she has self-titrated furosemide as needed and has done so for some time.      5/11/21:  Since her last visit, Tessa reports feeling generally well. She does have occasional episodes of angina (pain between her shoulders) for which she takes 1 NTG every 2-3 months, which has been longstanding and stable.      She denies any dyspnea at rest or with exertion, PND, orthopnea, peripheral edema, palpitations, lightheadedness or syncope.      5/17/2022:  Patient states that she has had SOB for the last month. States that she is able to walk 1-2 blocks before developing SOB, prior to this she was able to walk about 4 blocks before becoming SOB.  She denies chest pain, chest pressure, lightheadedness or dizziness.     12/06/22:  Since her last visit, she underwent coronary angiogram and PCI+ALVAREZ to RCA with dramatic improvement in her exertional dyspnea. She completed cardiac rehab. She does think she could walk somewhat further than before but has not pushed her exercise capacity recently. She plans to start this in in the new year. She has not required NTG at all since her last visit.     She denies any dyspnea at rest or with exertion, PND, orthopnea, peripheral edema, palpitations, lightheadedness or syncope.     June 2023:  Had COVID a  few weeks ago with symptoms of fatigue and short of breath. Device interrogation showed > 9 hours of atrial fibrillation on 5/29/23 in the setting of COVID 19 infection. She was not aware that she was in atrial fibrillation - no palpitations, lightheadedness, syncope. Continues to feel fatigued post COVID, though denies significant shortness of breath reminiscent of her prior angina. No chest pain or pressure. No orthopnea, PND. She has chronic leg swelling which improves with lasix and elevation. BLE edema has been stable. Has gained 5 lbs over the past few months. BP is usually high in clinic - home BP slightly above goal 134/60-140s. Developed worsening renal function on higher dose of lasix, ultimately underwent coronary angiogram with PCI of dRCA 7/18/23. She was readmitted 7/19 with melena - ASA and Brilinta stopped and plavix started with Eliquis 2.5 mg BID.    October 9th 2023:  Patient reports maybe slight improvement in breathing since her stent placement, but remains short of breath with exertion such as housework or going for a walk. She can walk or work around the house for about 15 minutes before having to stop and rest. Her breathing hasn't really gotten any worse, just hasn't gotten better. She denies any chest pain or pressure but over the past 2 weeks she has been experiencing an ache in her upper back behind her heart, worse with activity, improves with rest. She has had upper back pain before with prior angina, but usually radiates throughout the whole back. This is more localized. She denies any orthopnea, PND, weight gain. Leg swelling was worse, now better after increasing lasix to 40 mg BID about a week ago. She has not lightheadedness, palpitations or syncope. She has diarrhea, no bloody, dark, tarry looking stools. Home -137/50s-60s.     November 20th 2023:  Since her last visit she underwent PCI of her mLAD on 11/6/23 and notes significant improvement in her breathing. Able to do her  housework and take short walks without dyspnea. She is still experiencing back pain between her shoulder blades, worse with activity. She is working her PCP to evaluate this and seeing a spine physician in the near future. Her fluid status is stable on 40 mg lasix daily. She takes an additional 40 mg in the afternoon as needed for leg swelling, has not required this in some time. Currently no significant leg swelling, no orthopnea, PND or weight gain. She denies lightheadedness, palpitations, syncope. Her femoral puncture site is healing well.    Summary of CAD history:  10/27/2002: AMI s/p PCI+BMS (3.5x18mm Bx velocity) to mRCA  2/5/2003: Back pain; PCI+stent to mLCX c/b distal embolization/slow flow  4/11/2003: Unstable angina; PCI+stent (Hepacoat velocity) to mLAD  11/27/2007: PCI+DESx2 to pLAD (IVUS w/ calcification of LMCA and ulcerated plaque pLAD, 80% dRCA; also had 80% dLCX, too small to stent)  12/11/2007: Staged PCI. PCI+DESx1 (3.5x13mm Cypher) to dRCA (indefinite DAPT recommended at this time)  6/27/2008: Angina. mLCX stent 70% ISR. LAD and RCA stents patent. Myocardium at risk from LCX felt to be small, medical management preferred to PCI.  11/10/2009: Angina. Findings unchanged from 6/27/2008, FFR LAD 0.90. Medical management recommended.  7/23/2013: Unstable angina; PCI+ALVAREZ to mPDA. Diagnostic findings: LMCA 40% distal. LAD: pLAD stents patent mLAD 30% ISR dLAD 50% diffuse, D2 diffuse disease. LCX 80% mid ISR. RCA diffuse <30% mid, RPLAs diffuse disease, RPDA 100% occlusion.   5/27/2022: 90% dRCA in-stent -> PCI+DESx1 to dRCA.  50-60% pRCA (return for hemodynamic assessment if symptoms persist/recur) 50-60% small LCx (medical management recommended)   7/18/23: 80% ISR of dRCA --> PCI w/ALVAREZ x2, RPDA-2 lesion is 50% stenosed, RPDA-1 lesion is 65% stenosed, Ost LAD to Prox LAD lesion is 50% stenosed, Prox LAD to Mid LAD lesion is 40% stenosed, mLAD 70% stenosed, prox Cx to Mid Cx lesion is 55% stenosed  small vessel.   11/6/23 LAD PCI     PAST MEDICAL HISTORY:  Past Medical History:   Diagnosis Date    CAD (coronary artery disease)     CAD s/p PCI+BMS to MRCA 10/2002, PCI to mLCX 2/2003, PCI+DESx2 to pLAD 11/2007, PCI+DESx1 to dRCA 12/2007, PCI+ALVAREZ to RPDA 7/2013; on indefinite DAPT  10/27/2002    10/27/2002: AMI s/p PCI+BMS (3.5x18mm Bx velocity) to mRCA 2/5/2003: Back pain; PCI+stent to mLCX c/b distal embolization/slow flow 4/11/2003: Unstable angina; PCI+stent (Hepacoat velocity) to mLAD 11/27/2007: PCI+DESx2 to pLAD (IVUS w/ calcification of LMCA and ulcerated plaque pLAD, 80% dRCA; also had 80% dLCX, too small to stent) 12/11/2007: Staged PCI. PCI+DESx1 (3.5x13mm Cypher) to dRCA (indefinite DAPT recommended at this time) 6/27/2008: Angina. mLCX stent 70% ISR. LAD and RCA stents patent. Myocardium at risk from LCX felt to be small, medical management preferred to PCI. 11/10/2009: Angina. Findings unchanged from 6/27/2008, FFR LAD 0.90. Medical management recommended. 7/23/2013: Unstable angina; PCI+ALVAREZ to mPDA. Diagnostic findings: LMCA 40% distal. LAD: pLAD stents patent mLAD 30% ISR dLAD 50% diffuse, D2 diffuse disease. LCX 80% mid ISR. RCA diffuse <30% mid, RPLAs diffuse disease, RPDA 100% occlusion.      Cardiac Pacemaker- Medtronic, dual chamber- NOT dependant 11/28/2007    CKD (chronic kidney disease), stage IV (H)     Hyperlipidemia LDL goal <70     Hypertension     Stented coronary artery 10-    RCA    Stented coronary artery 2-5-2003    LCx    Stented coronary artery 4-    LAD    Stented coronary artery 11-    LAD    Stented coronary artery 12-    RCA    Transient complete heart block (H) 11/28/2007   New device detected atrial fibrillation > 9 hours 5/29/23    CURRENT MEDICATIONS:  Current Outpatient Medications   Medication    allopurinol (ZYLOPRIM) 100 MG tablet    amLODIPine (NORVASC) 10 MG tablet    apixaban ANTICOAGULANT (ELIQUIS) 2.5 MG tablet    aspirin (ASA) 325 MG  "EC tablet    aspirin 81 MG EC tablet    budesonide (PULMICORT) 0.5 MG/2ML neb solution    clopidogrel (PLAVIX) 75 MG tablet    cyanocolbalamin (VITAMIN B-12) 1000 MCG tablet    ferrous sulfate (FEROSUL) 325 (65 Fe) MG tablet    fluticasone-salmeterol (ADVAIR) 250-50 MCG/ACT inhaler    furosemide (LASIX) 40 MG tablet    hydrALAZINE (APRESOLINE) 10 MG tablet    isosorbide mononitrate (ISMO/MONOKET) 20 MG tablet    metoprolol tartrate (LOPRESSOR) 100 MG tablet    Multiple Vitamins-Minerals (PRESERVISION AREDS 2+MULTI VIT PO)    omeprazole (PRILOSEC) 40 MG DR capsule    potassium chloride ER (K-TAB/KLOR-CON) 10 MEQ CR tablet    rosuvastatin (CRESTOR) 20 MG tablet    timolol maleate (TIMOPTIC) 0.5 % ophthalmic solution    VITAMIN D3 25 MCG (1000 UT) tablet     No current facility-administered medications for this visit.     ALLERGIES:     Allergies   Allergen Reactions    Ciprofloxacin Rash    Codeine Sulfate Itching    Shrimp Swelling    Bactrim [Sulfamethoxazole W/Trimethoprim]      Patient unable to recall    Biaxin [Clarithromycin]     Chlorthalidone Nausea and Vomiting    Clonidine     Darvon [Propoxyphene Hcl]     Dilaudid [Hydromorphone] Visual Disturbance and Hallucination    Gabapentin Fatigue and Confusion     \"felt drunk\"    Indomethacin     Levaquin [Levofloxacin Hemihydrate]     Morphine Sulfate     Percocet [Oxycodone-Acetaminophen] Hallucination    Pregabalin Itching and Fatigue    Simvastatin Palpitations     Muscle weakness, leg cramping      Spironolactone      Dehydrated      Terazosin        FAMILY HISTORY:  Family History   Problem Relation Age of Onset    Cancer Sister 82        bladder cancer       SOCIAL HISTORY:  Social History     Tobacco Use    Smoking status: Former     Packs/day: 0.30     Years: 15.00     Additional pack years: 0.00     Total pack years: 4.50     Types: Cigarettes    Smokeless tobacco: Never    Tobacco comments:     Quit 22+ years ago   Substance Use Topics    Drug use: No " "      ROS:   A comprehensive 14 point review of systems is negative other than as mentioned in HPI.    Exam:  /74 (BP Location: Right arm, Patient Position: Sitting, Cuff Size: Adult Regular)   Pulse 77   Ht 1.687 m (5' 6.42\")   Wt 72.9 kg (160 lb 11.2 oz)   SpO2 100%   BMI 25.61 kg/m    GENERAL APPEARANCE: healthy, alert and no distress  EYES: no icterus, no xanthelasmas  ENT: normal palate, mucosa moist, no central cyanosis  NECK: No JVD   RESPIRATORY: lungs clear to auscultation - no rales, rhonchi or wheezes, no use of accessory muscles, no retractions, respirations are unlabored, normal respiratory rate  CARDIOVASCULAR: regular rhythm, no murmur noted today  GI: soft, non tender, bowel sounds normal,no abdominal bruits  EXTREMITIES: no edema, no bruits  NEURO: alert and oriented to person/place/time, normal speech, gait and affect  VASC: Radial, dorsalis pedis and posterior tibialis pulses 2+ bilaterally.  SKIN: no ecchymoses, no rashes.  PSYCH: cooperative, affect appropriate.     Labs:  Last Comprehensive Metabolic Panel:  Lab Results   Component Value Date     11/06/2023    POTASSIUM 5.1 11/06/2023    CHLORIDE 111 (H) 11/06/2023    CO2 20 (L) 11/06/2023    ANIONGAP 12 11/06/2023    GLC 92 11/06/2023    BUN 44.3 (H) 11/06/2023    CR 2.23 (H) 11/06/2023    GFRESTIMATED 21 (L) 11/06/2023    ALEXANDRO 9.3 11/06/2023       CBC RESULTS:   Recent Labs   Lab Test 11/06/23  1053   WBC 6.4   RBC 3.36*   HGB 9.2*   HCT 32.0*   MCV 95   MCH 27.4   MCHC 28.8*   RDW 17.4*   PLT 95*      Recent Labs   Lab Test 06/17/22  1044 08/26/20  0925   CHOL 139 153   HDL 50 52   LDL 60 71   TRIG 144 149     Testing/Procedures:    Coronary angiogram 11/6/23    Conclusion         Prox LAD to Mid LAD lesion is 40% stenosed.    Ost LAD to Prox LAD lesion is 50% stenosed.    Prox Cx to Mid Cx lesion is 55% stenosed.    1st Diag lesion is 85% stenosed.    2nd Diag lesion is 70% stenosed.    RPDA-2 lesion is 50% stenosed.    " RPDA-1 lesion is 65% stenosed.    Mid LAD lesion is 70% stenosed.     S/p ALVAREZ to mid LAD         Coronary Findings    Diagnostic  Dominance: Right  Left Anterior Descending   The vessel is small.   Ost LAD to Prox LAD lesion is 50% stenosed. The lesion was previously treated using a stent of unknown type.   Prox LAD to Mid LAD lesion is 40% stenosed. The lesion was previously treated using a stent of unknown type.   Mid LAD lesion is 70% stenosed.      First Diagonal Branch   The vessel is small. There is moderate diffuse disease throughout the vessel.   1st Diag lesion is 85% stenosed.      Second Diagonal Branch   2nd Diag lesion is 70% stenosed.      Left Circumflex   The vessel is small.   Prox Cx to Mid Cx lesion is 55% stenosed. The lesion was previously treated using a stent of unknown type.      First Obtuse Marginal Branch   The vessel is large.      Second Obtuse Marginal Branch   The vessel is small.      Right Coronary Artery   The vessel is large.   Previously placed Mid RCA to Dist RCA drug-eluting and unknown type of stents are widely patent. The lesion was previously treated using angioplasty.   Previously placed Dist RCA drug-eluting and unknown type of stents are widely patent. The lesion was previously treated using angioplasty.      Right Posterior Descending Artery   There is mild diffuse disease throughout the vessel.   RPDA-1 lesion is 65% stenosed.   RPDA-2 lesion is 50% stenosed. The lesion was previously treated using a stent of unknown type.      Right Posterior Atrioventricular Artery   There is mild diffuse disease throughout the vessel.         Intervention     Mid LAD lesion   Stent   The pre-interventional distal flow is normal (BREANNA 3). A STENT CORONARY ALVAREZ SYNERGY XD MR US 2.15J62ZE [id number] drug eluting stent was successfully placed. Post-stent angioplasty was performed. The post-interventional distal flow is normal (BREANNA 3).   There is a 0% residual stenosis post  intervention.             TTE 10/9/23:  Interpretation Summary  Global and regional left ventricular function is normal with an EF of 55-60%.  Right ventricular function, chamber size, wall motion, and thickness are  normal.  Mild to moderate mitral insufficiency is present.  Mild tricuspid insufficiency is present.  The right ventricular systolic pressure is 35mmHg above the right atrial  pressure.  The inferior vena cava is normal.  No pericardial effusion is present.  ______________________________________________________________________________  Left Ventricle  Global and regional left ventricular function is normal with an EF of 55-60%.  Left ventricular wall thickness is normal. Left ventricular size is normal.  Left ventricular diastolic function is not assessable. No regional wall motion  abnormalities are seen.     Right Ventricle  Right ventricular function, chamber size, wall motion, and thickness are  normal. A pacemaker lead is noted in the right ventricle.     Atria  The atria cannot be assessed.     Mitral Valve  Mild to moderate mitral insufficiency is present.     Aortic Valve  Aortic valve sclerosis is present.     Tricuspid Valve  Mild tricuspid insufficiency is present. The right ventricular systolic  pressure is 35mmHg above the right atrial pressure.     Pulmonic Valve  The pulmonic valve is normal. Mild pulmonic insufficiency is present.     Vessels  The thoracic aorta cannot be assessed. The pulmonary artery cannot be  assessed. The inferior vena cava is normal.     Pericardium  No pericardial effusion is present.     ______________________________________________________________________________  MMode/2D Measurements & Calculations  LVOT diam: 2.1 cm  LVOT area: 3.3 cm2     Doppler Measurements & Calculations  LV V1 max P.1 mmHg  LV V1 max: 100.6 cm/sec  LV V1 VTI: 24.4 cm  SV(LVOT): 81.6 ml  SI(LVOT): 45.6 ml/m2  TR max tawanna: 294.3 cm/sec  TR max P.6 mmHg     CORS  7/2023:    Comments/Patient Narrative    Ms. Tessa Mansfield is a pleasant 88 year old female with medical history pertinent for CAD (PCI at all 3 vessels at various times from 8345-8655, known 80% ISR of LCx), sick sinus syndrome s/p dual-chamber PPM, AF here for coronary angiography due to persistent HIGGINS.     Pre Procedure Diagnosis    worsening angina       Conclusion         Prox LAD to Mid LAD lesion is 40% stenosed.    Ost LAD to Prox LAD lesion is 50% stenosed.    Prox Cx to Mid Cx lesion is 55% stenosed.    1st Diag lesion is 85% stenosed.    2nd Diag lesion is 70% stenosed.    RPDA-2 lesion is 50% stenosed.    RPDA-1 lesion is 65% stenosed.    Mid LAD lesion is 70% stenosed.    Mid RCA to Dist RCA lesion is 80% stenosed.     Right dominance   3V diease   Distal RCA s/p prior stents with 80% ISR  RPDA with  to 65% stenosis   Prox LAD s/p prior stents x2 with 40 and 50% ISR and diffuse disease in mid to distal LAD segment   First diagonal small caliber with diffuse disease and 85% stenosis in mid segment   Mid Cx with 55% stenosis   ALVAREZ SYNERGY XD MR US 2.80H93YX [id number] in distal RCA with BREANNA 3 pre and post stenting          Plan     Follow bedrest per protocol   Continued medical management and lifestyle modifications for cardiovascular risk factor optimizations.     Recommendations    General Recommendations:  - Patient given specific instructions regarding care of arteriotomy site, activity restrictions, signs and symptoms of cardiac or vascular complications and to seek immediate medical evaluation should they occur.     Coronary Findings    Diagnostic  Dominance: Right  Left Anterior Descending   The vessel is small.   Ost LAD to Prox LAD lesion is 50% stenosed. The lesion was previously treated using a stent of unknown type.   Prox LAD to Mid LAD lesion is 40% stenosed. The lesion was previously treated using a stent of unknown type.   Mid LAD lesion is 70% stenosed.      First Diagonal Branch    The vessel is small. There is moderate diffuse disease throughout the vessel.   1st Diag lesion is 85% stenosed.      Second Diagonal Branch   2nd Diag lesion is 70% stenosed.      Left Circumflex   The vessel is small.   Prox Cx to Mid Cx lesion is 55% stenosed. The lesion was previously treated using a stent of unknown type.      First Obtuse Marginal Branch   The vessel is large.      Second Obtuse Marginal Branch   The vessel is small.      Right Coronary Artery   The vessel is large.   Mid RCA to Dist RCA lesion is 80% stenosed. The lesion was previously treated using a drug-eluting stent, a stent of unknown type and angioplasty.   Previously placed Dist RCA drug-eluting and unknown type of stents are widely patent. The lesion was previously treated using angioplasty.      Right Posterior Descending Artery   There is mild diffuse disease throughout the vessel.   RPDA-1 lesion is 65% stenosed.   RPDA-2 lesion is 50% stenosed. The lesion was previously treated using a stent of unknown type.      Right Posterior Atrioventricular Artery   There is mild diffuse disease throughout the vessel.         Intervention     Mid RCA to Dist RCA lesion   Stent (Also treats lesions: Dist RCA)   A guide catheter was successfully placed. The crossed the lesion. The pre-interventional distal flow is normal (BREANNA 3). A STENT CORONARY ALVAREZ SYNERGY XD MR US 2.43X40SK [id number] drug eluting stent was successfully placed. Pre-stent angioplastywasperformed using a CATH BALLOON NC EMERGE 2.62V64XX [id number] supply. . Post-stent angioplasty was performed using a CATH BALLOON NC EMERGE 2.00N68HE [id number] supply. . The post-interventional distal flow is normal (BREANNA 3). Theinterventionwas successful. No complications occurred at this lesion.   There is a 0% residual stenosis post intervention.      Dist RCA lesion   Stent (Also treats lesions: Mid RCA to Dist RCA)   See details in Mid RCA to Dist RCA lesion.   There is a  "0% residual stenosis post intervention.           Pressures Phase: Baseline     Time Systolic (mmHg) Diastolic (mmHg) Mean (mmHg) A Wave (mmHg) V Wave (mmHg) EDP (mmHg) Max dp/dt (mmHg/sec) HR (bpm) Content (mL/dL) SAT (%)   AO Pressures  2:18     75    101        65          Hemodynamic Waveforms -- Encounter Level:    Hemodynamic Waveforms: None found at the encounter level.  Hemodynamic Waveforms -- Order Level on 07/17/2023:    Scan on 7/17/2023 3:04 PM by Outside, Provider  Entry Locations    Potential access sites were evaluated for patency using ultrasound.   The right femoral artery was selected. Access was obtained under with Sonosite guidance using a standard 18 guage needle with direct visualization of needle entry.   INTRO SHEATH KRISHNA [id number]   Sheath inserted in the right femoral artery.   CATH ANGIO INFINITI 3DRC [id number]      Sheath exchanged in the right femoral artery.      Stent inserted over the wire.      WIRE GUIDE HI-TRQ  50 JTIP 0.014\"[id number]      CATH BALLOON EMERGE 2.5X12MM [id number]   Balloon inserted to right coronary artery and middle right coronary artery.   STENT CORONARY ALVAREZ SYNERGY XD MR US 2.59K44VG [id number]   Stent inserted over the wire.               STENT CORONARY ALVAREZ SYNERGY XD MR US 2.78L01SR [id number]   Stent inserted over the wire.     Procedure Summary    No Complications - Patient tolerated procedure well  DESCRIPTION:  1. Consent obtained with discussion of risks.  All questions were answered.  2. Sterile prep and procedure.  3. Location with Sheaths:   Rt Femoral Arterial: A micropuncture 21 gauge needle with Sonosite and Fluoroscopic guidance was used to establish vascular access.  Access was successful.  4 Fr 10 cm [short]   4. Access: Local anesthetic with lidocaine.    5. Diagnostic Catheters:The LMCA was successfully engaged with a 4 Fr  JL 4  The RCA was successfully engaged with a 4 Fr  3DRC  6. Guiding " Catheters:  6 Fr  JR 4 GC  7. Sheath Management:Single Sheath:  The femoral sheath was manually removed in the cardiac catheterization laboratory.  8. Estimated blood loss: < 5 mlThe attending interventional cardiologist was present and supervised all critical aspects the procedure.       Device interrogation 5/30/23     CareAlert received for  AT/AF Daily [place] > Threshold & 9 hours in AT/AF Since Last Session.     Remote pacemaker transmission received and reviewed. Device transmission sent per MD orders.     Device: Medtronic [id number] [place] XT DR DIA  Normal Device Function  Mode: AAIR>DDDR  bpm  AP: 73.5%  : 0.1%  Presenting EGM: AF with vent rate ~ 100-110 bpm  Short V-V intervals: 0  Lead Trends Appear Stable: Yes  Estimated battery longevity to RRT = 10.1 years  Atrial Arrhythmia: 2 AT/AF episodes recorded - 13 sec - > 9 hours in duration. Episode remains in progress since 5/29/23 @ 2132.  AF [place]: 0.4%  Anticoagulant: None - Patient reports she is taking Plavix and ASA.  Ventricular Arrhythmia: 0  Pt Notified of Transmission Results: Yes, via telephone. Patient reports she is not feeling well and has Covid. Patient reports that she has been sick since Sunday 5/28/23. Dr. Santoyo notified.     Plan: Device follow-up every 3 months.  GERALDO Gomez RN     Device interrogation 11/29/22: 71% AP. No AF. Normal device function.    Coronary angiogram/RHC/PCI 5/27/22:  90% dRCA in-stent -> PCI+ALVAREZ   50-60% pRCA (return for hemodynamic assessment if symptoms persist/recur)  50-60% small LCx (medical management recommended    RA 13 RV 46/13 PA 46/16 (26) PCW 15  Jamila CO 4.76 CI 2.6  PVR by Jamila CO 2.3    TTE 05/17/22: LVEF:60-65%, normal RV function, moderate MR, moderate TR, normal IVC    Device check 3/25/22: Normal device function. AP: 78% : <0.1% Presenting EGM: AP-VS @ 83 bpm. 2 seconds of AT/AF. <0.1% AF burden. No ventricular arrhythmias.       Device check 05/11/21: Normal device function. 86.3% AP  <1% . Intrinsic rhythm sinus bradycardia at 48 bpm. No atrial or ventricular arrhythmias. Lead trends stable.    TTE 10/15/19:   Normal LV/RV function, LVEF=60-65%.   At least moderate MR and moderate-to-severe TR.  RVSP~39+RAP~15 mmHg.  Compared to 7/24/13: MR and TR have worsened.      Assessment and Plan:     1. CAD s/p multiple PCIs as above (last to RPDA 7/23/13, PCI+DESx1 to dRCA 5/27/22, ISR of dRCA s/p PCI 7/2023, mLAD 10/2023):  Dyspnea improved significantly post-PCI 6/2022-- likely her anginal equivalent (near-normal PCW on RHC at the time of coronary angiogram/PCI supports this.) Had recurrent exertional dyspnea 7/2023, found to have ISR of mid to distal RCA, s/p PCI with 2 ALVAREZ. Post cath still experiencing SOB with exertion and upper back pain with exertion, resolves with rest. Underwent LAD PCI with significant improvement in breathing, still have back pain, unlikely cardiac.   - Continue Plavix and Eliquis, stop aspirin   - Continue Crestor 20 mg daily     2. Paroxysmal atrial fibrillation:  - DRD4OE3-rhxt of 5 (HTN, age, CAD, female) indicating high risk of stroke  - Eliquis 2.5 mg BID (reduced dose due to CKD, age)  - Continue rate control with metoprolol 100 mg BID     3. Moderate mitral regurgitation, stable by TTE 10/9/2023  4. Mild tricuspid regurgitation, improved by TTE 10/9/2023  - Continue lasix 40 mg daily, extra 40 mg PRN  - Continue Imdur 40 TID and start hydral 30 mg TID for afterload reduction and BP management   - Will continue to follow clinically and with periodic TTE     5. Renal Hypertension, controlled  6. CKD stage 4  - Currently on amlodipine 10 mg, Imdur 120 mg daily, hydralazine 30 mg TID   -followed by Odilia Martin and [facility]     7. Sick sinus syndrome s/p dual chamber PPM:  -routine device clinic follow up    RTC:    Labs today   Follow-up Dr. Santoyo 12/2023      Randa Ann, JI, CNP  Structural Heart Nurse Practitioner  [facility]  [facility]: [phone]  Pager: [phone]

## 2023-11-20 ENCOUNTER — OFFICE VISIT (OUTPATIENT)
Dept: CARDIOLOGY | Facility: CLINIC | Age: 88
End: 2023-11-20
Attending: NURSE PRACTITIONER
Payer: COMMERCIAL

## 2023-11-20 ENCOUNTER — LAB (OUTPATIENT)
Dept: LAB | Facility: CLINIC | Age: 88
End: 2023-11-20
Payer: COMMERCIAL

## 2023-11-20 VITALS
BODY MASS INDEX: 25.83 KG/M2 | HEART RATE: 77 BPM | DIASTOLIC BLOOD PRESSURE: 74 MMHG | SYSTOLIC BLOOD PRESSURE: 137 MMHG | WEIGHT: 160.7 LBS | HEIGHT: 66 IN | OXYGEN SATURATION: 100 %

## 2023-11-20 DIAGNOSIS — I25.10 CORONARY ARTERY DISEASE INVOLVING NATIVE CORONARY ARTERY OF NATIVE HEART WITHOUT ANGINA PECTORIS: ICD-10-CM

## 2023-11-20 DIAGNOSIS — Z98.62 S/P ANGIOPLASTY: Primary | ICD-10-CM

## 2023-11-20 DIAGNOSIS — Z98.62 S/P ANGIOPLASTY: ICD-10-CM

## 2023-11-20 DIAGNOSIS — N18.4 CKD (CHRONIC KIDNEY DISEASE) STAGE 4, GFR 15-29 ML/MIN (H): ICD-10-CM

## 2023-11-20 LAB
ALBUMIN MFR UR ELPH: 57.3 MG/DL
ALBUMIN SERPL BCG-MCNC: 4.2 G/DL (ref 3.5–5.2)
ANION GAP SERPL CALCULATED.3IONS-SCNC: 13 MMOL/L (ref 7–15)
BUN SERPL-MCNC: 51 MG/DL (ref 8–23)
CALCIUM SERPL-MCNC: 9.4 MG/DL (ref 8.8–10.2)
CHLORIDE SERPL-SCNC: 110 MMOL/L (ref 98–107)
CHOLEST SERPL-MCNC: 105 MG/DL
CREAT SERPL-MCNC: 2.31 MG/DL (ref 0.51–0.95)
CREAT UR-MCNC: 99.5 MG/DL
DEPRECATED HCO3 PLAS-SCNC: 19 MMOL/L (ref 22–29)
EGFRCR SERPLBLD CKD-EPI 2021: 20 ML/MIN/1.73M2
ERYTHROCYTE [DISTWIDTH] IN BLOOD BY AUTOMATED COUNT: 18.6 % (ref 10–15)
GLUCOSE SERPL-MCNC: 105 MG/DL (ref 70–99)
HCT VFR BLD AUTO: 28.7 % (ref 35–47)
HDLC SERPL-MCNC: 44 MG/DL
HGB BLD-MCNC: 8.5 G/DL (ref 11.7–15.7)
IRON BINDING CAPACITY (ROCHE): 362 UG/DL (ref 240–430)
IRON SATN MFR SERPL: 14 % (ref 15–46)
IRON SERPL-MCNC: 50 UG/DL (ref 37–145)
LDLC SERPL CALC-MCNC: 36 MG/DL
MCH RBC QN AUTO: 27.8 PG (ref 26.5–33)
MCHC RBC AUTO-ENTMCNC: 29.6 G/DL (ref 31.5–36.5)
MCV RBC AUTO: 94 FL (ref 78–100)
NONHDLC SERPL-MCNC: 61 MG/DL
PHOSPHATE SERPL-MCNC: 4 MG/DL (ref 2.5–4.5)
PLATELET # BLD AUTO: 117 10E3/UL (ref 150–450)
POTASSIUM SERPL-SCNC: 4.5 MMOL/L (ref 3.4–5.3)
PROT/CREAT 24H UR: 0.58 MG/MG CR (ref 0–0.2)
PTH-INTACT SERPL-MCNC: 129 PG/ML (ref 15–65)
RBC # BLD AUTO: 3.06 10E6/UL (ref 3.8–5.2)
SODIUM SERPL-SCNC: 142 MMOL/L (ref 135–145)
TRIGL SERPL-MCNC: 124 MG/DL
WBC # BLD AUTO: 6.2 10E3/UL (ref 4–11)

## 2023-11-20 PROCEDURE — 80069 RENAL FUNCTION PANEL: CPT | Performed by: PATHOLOGY

## 2023-11-20 PROCEDURE — 83970 ASSAY OF PARATHORMONE: CPT | Performed by: PATHOLOGY

## 2023-11-20 PROCEDURE — G0463 HOSPITAL OUTPT CLINIC VISIT: HCPCS | Performed by: NURSE PRACTITIONER

## 2023-11-20 PROCEDURE — 80061 LIPID PANEL: CPT | Performed by: PATHOLOGY

## 2023-11-20 PROCEDURE — 83550 IRON BINDING TEST: CPT | Performed by: PATHOLOGY

## 2023-11-20 PROCEDURE — 85027 COMPLETE CBC AUTOMATED: CPT | Performed by: PATHOLOGY

## 2023-11-20 PROCEDURE — 84156 ASSAY OF PROTEIN URINE: CPT | Performed by: PATHOLOGY

## 2023-11-20 PROCEDURE — 99214 OFFICE O/P EST MOD 30 MIN: CPT | Performed by: NURSE PRACTITIONER

## 2023-11-20 PROCEDURE — 36415 COLL VENOUS BLD VENIPUNCTURE: CPT | Performed by: PATHOLOGY

## 2023-11-20 PROCEDURE — 83540 ASSAY OF IRON: CPT | Performed by: PATHOLOGY

## 2023-11-20 ASSESSMENT — PAIN SCALES - GENERAL: PAINLEVEL: NO PAIN (0)

## 2023-11-20 NOTE — LETTER
11/20/2023      RE: Tessa Mansfield  [address]  [facility] [address]       Dear Colleague,    Thank you for the opportunity to participate in the care of your patient, Tessa Mansfield, at the [facility] [place] at [facility]. Please see a copy of my visit note below.    Chief complaint: LAD PCI follow-up    HPI:   Tessa Mansfield is a 88 year old female patient of Dr. Santoyo with extensive CAD history as detailed below (PCI to all 3 vessels at various times from 6192-6153) on indefinite dual antiplatelet therapy and sick sinus syndrome s/p dual-chamber PPM, device detected atrial fibrillation now on AC, CKD, last evaluated 10/2023 after RCA PCI, continued to experience dyspnea and underwent LAD PCI 11/6/23.     12/10/19:  She underwent pacemaker generator change 10/15/19 due to her prior generator reaching LOLA (new device: Medtronic [id number] Funkley XT DR DIA dual chamber PPM) without event.      She reports volume status has been about stable and that she has self-titrated furosemide as needed and has done so for some time.      5/11/21:  Since her last visit, Tessa reports feeling generally well. She does have occasional episodes of angina (pain between her shoulders) for which she takes 1 NTG every 2-3 months, which has been longstanding and stable.      She denies any dyspnea at rest or with exertion, PND, orthopnea, peripheral edema, palpitations, lightheadedness or syncope.      5/17/2022:  Patient states that she has had SOB for the last month. States that she is able to walk 1-2 blocks before developing SOB, prior to this she was able to walk about 4 blocks before becoming SOB.  She denies chest pain, chest pressure, lightheadedness or dizziness.     12/06/22:  Since her last visit, she underwent coronary angiogram and PCI+ALVAREZ to RCA with dramatic improvement in her exertional dyspnea. She completed cardiac rehab. She does think she  could walk somewhat further than before but has not pushed her exercise capacity recently. She plans to start this in in the new year. She has not required NTG at all since her last visit.     She denies any dyspnea at rest or with exertion, PND, orthopnea, peripheral edema, palpitations, lightheadedness or syncope.     June 2023:  Had COVID a few weeks ago with symptoms of fatigue and short of breath. Device interrogation showed > 9 hours of atrial fibrillation on 5/29/23 in the setting of COVID 19 infection. She was not aware that she was in atrial fibrillation - no palpitations, lightheadedness, syncope. Continues to feel fatigued post COVID, though denies significant shortness of breath reminiscent of her prior angina. No chest pain or pressure. No orthopnea, PND. She has chronic leg swelling which improves with lasix and elevation. BLE edema has been stable. Has gained 5 lbs over the past few months. BP is usually high in clinic - home BP slightly above goal 134/60-140s. Developed worsening renal function on higher dose of lasix, ultimately underwent coronary angiogram with PCI of dRCA 7/18/23. She was readmitted 7/19 with melena - ASA and Brilinta stopped and plavix started with Eliquis 2.5 mg BID.    October 9th 2023:  Patient reports maybe slight improvement in breathing since her stent placement, but remains short of breath with exertion such as housework or going for a walk. She can walk or work around the house for about 15 minutes before having to stop and rest. Her breathing hasn't really gotten any worse, just hasn't gotten better. She denies any chest pain or pressure but over the past 2 weeks she has been experiencing an ache in her upper back behind her heart, worse with activity, improves with rest. She has had upper back pain before with prior angina, but usually radiates throughout the whole back. This is more localized. She denies any orthopnea, PND, weight gain. Leg swelling was worse, now  better after increasing lasix to 40 mg BID about a week ago. She has not lightheadedness, palpitations or syncope. She has diarrhea, no bloody, dark, tarry looking stools. Home -137/50s-60s.     November 20th 2023:  Since her last visit she underwent PCI of her mLAD on 11/6/23 and notes significant improvement in her breathing. Able to do her housework and take short walks without dyspnea. She is still experiencing back pain between her shoulder blades, worse with activity. She is working her PCP to evaluate this and seeing a spine physician in the near future. Her fluid status is stable on 40 mg lasix daily. She takes an additional 40 mg in the afternoon as needed for leg swelling, has not required this in some time. Currently no significant leg swelling, no orthopnea, PND or weight gain. She denies lightheadedness, palpitations, syncope. Her femoral puncture site is healing well.    Summary of CAD history:  10/27/2002: AMI s/p PCI+BMS (3.5x18mm Bx velocity) to mRCA  2/5/2003: Back pain; PCI+stent to mLCX c/b distal embolization/slow flow  4/11/2003: Unstable angina; PCI+stent (Hepacoat velocity) to mLAD  11/27/2007: PCI+DESx2 to pLAD (IVUS w/ calcification of LMCA and ulcerated plaque pLAD, 80% dRCA; also had 80% dLCX, too small to stent)  12/11/2007: Staged PCI. PCI+DESx1 (3.5x13mm Cypher) to dRCA (indefinite DAPT recommended at this time)  6/27/2008: Angina. mLCX stent 70% ISR. LAD and RCA stents patent. Myocardium at risk from LCX felt to be small, medical management preferred to PCI.  11/10/2009: Angina. Findings unchanged from 6/27/2008, FFR LAD 0.90. Medical management recommended.  7/23/2013: Unstable angina; PCI+ALVAREZ to mPDA. Diagnostic findings: LMCA 40% distal. LAD: pLAD stents patent mLAD 30% ISR dLAD 50% diffuse, D2 diffuse disease. LCX 80% mid ISR. RCA diffuse <30% mid, RPLAs diffuse disease, RPDA 100% occlusion.   5/27/2022: 90% dRCA in-stent -> PCI+DESx1 to dRCA.  50-60% pRCA (return for  hemodynamic assessment if symptoms persist/recur) 50-60% small LCx (medical management recommended)   7/18/23: 80% ISR of dRCA --> PCI w/ALVAREZ x2, RPDA-2 lesion is 50% stenosed, RPDA-1 lesion is 65% stenosed, Ost LAD to Prox LAD lesion is 50% stenosed, Prox LAD to Mid LAD lesion is 40% stenosed, mLAD 70% stenosed, prox Cx to Mid Cx lesion is 55% stenosed small vessel.   11/6/23 LAD PCI     PAST MEDICAL HISTORY:  Past Medical History:   Diagnosis Date    CAD (coronary artery disease)     CAD s/p PCI+BMS to MRCA 10/2002, PCI to mLCX 2/2003, PCI+DESx2 to pLAD 11/2007, PCI+DESx1 to dRCA 12/2007, PCI+ALVAREZ to RPDA 7/2013; on indefinite DAPT  10/27/2002    10/27/2002: AMI s/p PCI+BMS (3.5x18mm Bx velocity) to mRCA 2/5/2003: Back pain; PCI+stent to mLCX c/b distal embolization/slow flow 4/11/2003: Unstable angina; PCI+stent (Hepacoat velocity) to mLAD 11/27/2007: PCI+DESx2 to pLAD (IVUS w/ calcification of LMCA and ulcerated plaque pLAD, 80% dRCA; also had 80% dLCX, too small to stent) 12/11/2007: Staged PCI. PCI+DESx1 (3.5x13mm Cypher) to dRCA (indefinite DAPT recommended at this time) 6/27/2008: Angina. mLCX stent 70% ISR. LAD and RCA stents patent. Myocardium at risk from LCX felt to be small, medical management preferred to PCI. 11/10/2009: Angina. Findings unchanged from 6/27/2008, FFR LAD 0.90. Medical management recommended. 7/23/2013: Unstable angina; PCI+ALVAREZ to mPDA. Diagnostic findings: LMCA 40% distal. LAD: pLAD stents patent mLAD 30% ISR dLAD 50% diffuse, D2 diffuse disease. LCX 80% mid ISR. RCA diffuse <30% mid, RPLAs diffuse disease, RPDA 100% occlusion.      Cardiac Pacemaker- Medtronic, dual chamber- NOT dependant 11/28/2007    CKD (chronic kidney disease), stage IV (H)     Hyperlipidemia LDL goal <70     Hypertension     Stented coronary artery 10-    RCA    Stented coronary artery 2-5-2003    LCx    Stented coronary artery 4-    LAD    Stented coronary artery 11-    LAD    Stented  "coronary artery 12-    RCA    Transient complete heart block (H) 11/28/2007   New device detected atrial fibrillation > 9 hours 5/29/23    CURRENT MEDICATIONS:  Current Outpatient Medications   Medication    allopurinol (ZYLOPRIM) 100 MG tablet    amLODIPine (NORVASC) 10 MG tablet    apixaban ANTICOAGULANT (ELIQUIS) 2.5 MG tablet    aspirin (ASA) 325 MG EC tablet    aspirin 81 MG EC tablet    budesonide (PULMICORT) 0.5 MG/2ML neb solution    clopidogrel (PLAVIX) 75 MG tablet    cyanocolbalamin (VITAMIN B-12) 1000 MCG tablet    ferrous sulfate (FEROSUL) 325 (65 Fe) MG tablet    fluticasone-salmeterol (ADVAIR) 250-50 MCG/ACT inhaler    furosemide (LASIX) 40 MG tablet    hydrALAZINE (APRESOLINE) 10 MG tablet    isosorbide mononitrate (ISMO/MONOKET) 20 MG tablet    metoprolol tartrate (LOPRESSOR) 100 MG tablet    Multiple Vitamins-Minerals (PRESERVISION AREDS 2+MULTI VIT PO)    omeprazole (PRILOSEC) 40 MG DR capsule    potassium chloride ER (K-TAB/KLOR-CON) 10 MEQ CR tablet    rosuvastatin (CRESTOR) 20 MG tablet    timolol maleate (TIMOPTIC) 0.5 % ophthalmic solution    VITAMIN D3 25 MCG (1000 UT) tablet     No current facility-administered medications for this visit.     ALLERGIES:     Allergies   Allergen Reactions    Ciprofloxacin Rash    Codeine Sulfate Itching    Shrimp Swelling    Bactrim [Sulfamethoxazole W/Trimethoprim]      Patient unable to recall    Biaxin [Clarithromycin]     Chlorthalidone Nausea and Vomiting    Clonidine     Darvon [Propoxyphene Hcl]     Dilaudid [Hydromorphone] Visual Disturbance and Hallucination    Gabapentin Fatigue and Confusion     \"felt drunk\"    Indomethacin     Levaquin [Levofloxacin Hemihydrate]     Morphine Sulfate     Percocet [Oxycodone-Acetaminophen] Hallucination    Pregabalin Itching and Fatigue    Simvastatin Palpitations     Muscle weakness, leg cramping      Spironolactone      Dehydrated      Terazosin        FAMILY HISTORY:  Family History   Problem Relation Age " "of Onset    Cancer Sister 82        bladder cancer       SOCIAL HISTORY:  Social History     Tobacco Use    Smoking status: Former     Packs/day: 0.30     Years: 15.00     Additional pack years: 0.00     Total pack years: 4.50     Types: Cigarettes    Smokeless tobacco: Never    Tobacco comments:     Quit 22+ years ago   Substance Use Topics    Drug use: No       ROS:   A comprehensive 14 point review of systems is negative other than as mentioned in HPI.    Exam:  /74 (BP Location: Right arm, Patient Position: Sitting, Cuff Size: Adult Regular)   Pulse 77   Ht 1.687 m (5' 6.42\")   Wt 72.9 kg (160 lb 11.2 oz)   SpO2 100%   BMI 25.61 kg/m    GENERAL APPEARANCE: healthy, alert and no distress  EYES: no icterus, no xanthelasmas  ENT: normal palate, mucosa moist, no central cyanosis  NECK: No JVD   RESPIRATORY: lungs clear to auscultation - no rales, rhonchi or wheezes, no use of accessory muscles, no retractions, respirations are unlabored, normal respiratory rate  CARDIOVASCULAR: regular rhythm, no murmur noted today  GI: soft, non tender, bowel sounds normal,no abdominal bruits  EXTREMITIES: no edema, no bruits  NEURO: alert and oriented to person/place/time, normal speech, gait and affect  VASC: Radial, dorsalis pedis and posterior tibialis pulses 2+ bilaterally.  SKIN: no ecchymoses, no rashes.  PSYCH: cooperative, affect appropriate.     Labs:  Last Comprehensive Metabolic Panel:  Lab Results   Component Value Date     11/06/2023    POTASSIUM 5.1 11/06/2023    CHLORIDE 111 (H) 11/06/2023    CO2 20 (L) 11/06/2023    ANIONGAP 12 11/06/2023    GLC 92 11/06/2023    BUN 44.3 (H) 11/06/2023    CR 2.23 (H) 11/06/2023    GFRESTIMATED 21 (L) 11/06/2023    ALEXANDRO 9.3 11/06/2023       CBC RESULTS:   Recent Labs   Lab Test 11/06/23  1053   WBC 6.4   RBC 3.36*   HGB 9.2*   HCT 32.0*   MCV 95   MCH 27.4   MCHC 28.8*   RDW 17.4*   PLT 95*      Recent Labs   Lab Test 06/17/22  1044 08/26/20  0925   CHOL 139 153 "   HDL 50 52   LDL 60 71   TRIG 144 149     Testing/Procedures:    Coronary angiogram 11/6/23    Conclusion         Prox LAD to Mid LAD lesion is 40% stenosed.    Ost LAD to Prox LAD lesion is 50% stenosed.    Prox Cx to Mid Cx lesion is 55% stenosed.    1st Diag lesion is 85% stenosed.    2nd Diag lesion is 70% stenosed.    RPDA-2 lesion is 50% stenosed.    RPDA-1 lesion is 65% stenosed.    Mid LAD lesion is 70% stenosed.     S/p ALVAREZ to mid LAD         Coronary Findings    Diagnostic  Dominance: Right  Left Anterior Descending   The vessel is small.   Ost LAD to Prox LAD lesion is 50% stenosed. The lesion was previously treated using a stent of unknown type.   Prox LAD to Mid LAD lesion is 40% stenosed. The lesion was previously treated using a stent of unknown type.   Mid LAD lesion is 70% stenosed.      First Diagonal Branch   The vessel is small. There is moderate diffuse disease throughout the vessel.   1st Diag lesion is 85% stenosed.      Second Diagonal Branch   2nd Diag lesion is 70% stenosed.      Left Circumflex   The vessel is small.   Prox Cx to Mid Cx lesion is 55% stenosed. The lesion was previously treated using a stent of unknown type.      First Obtuse Marginal Branch   The vessel is large.      Second Obtuse Marginal Branch   The vessel is small.      Right Coronary Artery   The vessel is large.   Previously placed Mid RCA to Dist RCA drug-eluting and unknown type of stents are widely patent. The lesion was previously treated using angioplasty.   Previously placed Dist RCA drug-eluting and unknown type of stents are widely patent. The lesion was previously treated using angioplasty.      Right Posterior Descending Artery   There is mild diffuse disease throughout the vessel.   RPDA-1 lesion is 65% stenosed.   RPDA-2 lesion is 50% stenosed. The lesion was previously treated using a stent of unknown type.      Right Posterior Atrioventricular Artery   There is mild diffuse disease throughout the  vessel.         Intervention     Mid LAD lesion   Stent   The pre-interventional distal flow is normal (BREANNA 3). A STENT CORONARY ALVAREZ SYNERGY XD MR US 2.26P29AW [id number] drug eluting stent was successfully placed. Post-stent angioplasty was performed. The post-interventional distal flow is normal (BREANNA 3).   There is a 0% residual stenosis post intervention.             TTE 10/9/23:  Interpretation Summary  Global and regional left ventricular function is normal with an EF of 55-60%.  Right ventricular function, chamber size, wall motion, and thickness are  normal.  Mild to moderate mitral insufficiency is present.  Mild tricuspid insufficiency is present.  The right ventricular systolic pressure is 35mmHg above the right atrial  pressure.  The inferior vena cava is normal.  No pericardial effusion is present.  ______________________________________________________________________________  Left Ventricle  Global and regional left ventricular function is normal with an EF of 55-60%.  Left ventricular wall thickness is normal. Left ventricular size is normal.  Left ventricular diastolic function is not assessable. No regional wall motion  abnormalities are seen.     Right Ventricle  Right ventricular function, chamber size, wall motion, and thickness are  normal. A pacemaker lead is noted in the right ventricle.     Atria  The atria cannot be assessed.     Mitral Valve  Mild to moderate mitral insufficiency is present.     Aortic Valve  Aortic valve sclerosis is present.     Tricuspid Valve  Mild tricuspid insufficiency is present. The right ventricular systolic  pressure is 35mmHg above the right atrial pressure.     Pulmonic Valve  The pulmonic valve is normal. Mild pulmonic insufficiency is present.     Vessels  The thoracic aorta cannot be assessed. The pulmonary artery cannot be  assessed. The inferior vena cava is normal.     Pericardium  No pericardial effusion is present.      ______________________________________________________________________________  MMode/2D Measurements & Calculations  LVOT diam: 2.1 cm  LVOT area: 3.3 cm2     Doppler Measurements & Calculations  LV V1 max P.1 mmHg  LV V1 max: 100.6 cm/sec  LV V1 VTI: 24.4 cm  SV(LVOT): 81.6 ml  SI(LVOT): 45.6 ml/m2  TR max tawanna: 294.3 cm/sec  TR max P.6 mmHg     CORS 2023:    Comments/Patient Narrative    Ms. Tessa Mansfield is a pleasant 88 year old female with medical history pertinent for CAD (PCI at all 3 vessels at various times from 5464-5911, known 80% ISR of LCx), sick sinus syndrome s/p dual-chamber PPM, AF here for coronary angiography due to persistent HIGGINS.     Pre Procedure Diagnosis    worsening angina       Conclusion         Prox LAD to Mid LAD lesion is 40% stenosed.    Ost LAD to Prox LAD lesion is 50% stenosed.    Prox Cx to Mid Cx lesion is 55% stenosed.    1st Diag lesion is 85% stenosed.    2nd Diag lesion is 70% stenosed.    RPDA-2 lesion is 50% stenosed.    RPDA-1 lesion is 65% stenosed.    Mid LAD lesion is 70% stenosed.    Mid RCA to Dist RCA lesion is 80% stenosed.     Right dominance   3V diease   Distal RCA s/p prior stents with 80% ISR  RPDA with  to 65% stenosis   Prox LAD s/p prior stents x2 with 40 and 50% ISR and diffuse disease in mid to distal LAD segment   First diagonal small caliber with diffuse disease and 85% stenosis in mid segment   Mid Cx with 55% stenosis   ALVAREZ SYNERGY XD MR US 2.98V21SD [id number] in distal RCA with BREANNA 3 pre and post stenting          Plan     Follow bedrest per protocol   Continued medical management and lifestyle modifications for cardiovascular risk factor optimizations.     Recommendations    General Recommendations:  - Patient given specific instructions regarding care of arteriotomy site, activity restrictions, signs and symptoms of cardiac or vascular complications and to seek immediate medical evaluation should they occur.     Coronary  Findings    Diagnostic  Dominance: Right  Left Anterior Descending   The vessel is small.   Ost LAD to Prox LAD lesion is 50% stenosed. The lesion was previously treated using a stent of unknown type.   Prox LAD to Mid LAD lesion is 40% stenosed. The lesion was previously treated using a stent of unknown type.   Mid LAD lesion is 70% stenosed.      First Diagonal Branch   The vessel is small. There is moderate diffuse disease throughout the vessel.   1st Diag lesion is 85% stenosed.      Second Diagonal Branch   2nd Diag lesion is 70% stenosed.      Left Circumflex   The vessel is small.   Prox Cx to Mid Cx lesion is 55% stenosed. The lesion was previously treated using a stent of unknown type.      First Obtuse Marginal Branch   The vessel is large.      Second Obtuse Marginal Branch   The vessel is small.      Right Coronary Artery   The vessel is large.   Mid RCA to Dist RCA lesion is 80% stenosed. The lesion was previously treated using a drug-eluting stent, a stent of unknown type and angioplasty.   Previously placed Dist RCA drug-eluting and unknown type of stents are widely patent. The lesion was previously treated using angioplasty.      Right Posterior Descending Artery   There is mild diffuse disease throughout the vessel.   RPDA-1 lesion is 65% stenosed.   RPDA-2 lesion is 50% stenosed. The lesion was previously treated using a stent of unknown type.      Right Posterior Atrioventricular Artery   There is mild diffuse disease throughout the vessel.         Intervention     Mid RCA to Dist RCA lesion   Stent (Also treats lesions: Dist RCA)   A guide catheter was successfully placed. The crossed the lesion. The pre-interventional distal flow is normal (BREANNA 3). A STENT CORONARY ALVAREZ SYNERGY XD [facility] 2.83U58UB [id number] drug eluting stent was successfully placed. Pre-stent angioplastywasperformed using a CATH BALLOON NC EMERGE 2.76V39FG [id number] supply. . Post-stent angioplasty was performed using  "a CATH BALLOON NC EMERGE 2.75N76TH [id number] supply. . The post-interventional distal flow is normal (BREANNA 3). Theinterventionwas successful. No complications occurred at this lesion.   There is a 0% residual stenosis post intervention.      Dist RCA lesion   Stent (Also treats lesions: Mid RCA to Dist RCA)   See details in Mid RCA to Dist RCA lesion.   There is a 0% residual stenosis post intervention.           Pressures Phase: Baseline     Time Systolic (mmHg) Diastolic (mmHg) Mean (mmHg) A Wave (mmHg) V Wave (mmHg) EDP (mmHg) Max dp/dt (mmHg/sec) HR (bpm) Content (mL/dL) SAT (%)   AO Pressures  2:18     75    101        65          Hemodynamic Waveforms -- Encounter Level:    Hemodynamic Waveforms: None found at the encounter level.  Hemodynamic Waveforms -- Order Level on 07/17/2023:    Scan on 7/17/2023 3:04 PM by Outside, Provider  Entry Locations    Potential access sites were evaluated for patency using ultrasound.   The right femoral artery was selected. Access was obtained under with Sonosite guidance using a standard 18 guage needle with direct visualization of needle entry.   INTRO SHEATH KRISHNA [id number]   Sheath inserted in the right femoral artery.   CATH ANGIO INFINITI 3DRC [id number]      Sheath exchanged in the right femoral artery.      Stent inserted over the wire.      WIRE GUIDE HI-TRQ  50 JTIP 0.014\"[id number]      CATH BALLOON EMERGE 2.5X12MM [id number]   Balloon inserted to right coronary artery and middle right coronary artery.   STENT CORONARY ALVAREZ SYNERGY XD MR US 2.87U50WH [id number]   Stent inserted over the wire.               STENT CORONARY ALVAREZ SYNERGY XD MR US 2.04F05CN [id number]   Stent inserted over the wire.     Procedure Summary    No Complications - Patient tolerated procedure well  DESCRIPTION:  1. Consent obtained with discussion of risks.  All questions were answered.  2. Sterile prep and procedure.  3. Location with Sheaths: "   Rt Femoral Arterial: A micropuncture 21 gauge needle with Sonosite and Fluoroscopic guidance was used to establish vascular access.  Access was successful.  4 Fr 10 cm [short]   4. Access: Local anesthetic with lidocaine.    5. Diagnostic Catheters:The LMCA was successfully engaged with a 4 Fr  JL 4  The RCA was successfully engaged with a 4 Fr  3DRC  6. Guiding Catheters:  6 Fr  JR 4 GC  7. Sheath Management:Single Sheath:  The femoral sheath was manually removed in the cardiac catheterization laboratory.  8. Estimated blood loss: < 5 mlThe attending interventional cardiologist was present and supervised all critical aspects the procedure.       Device interrogation 5/30/23     CareAlert received for  AT/AF Daily [place] > Threshold & 9 hours in AT/AF Since Last Session.     Remote pacemaker transmission received and reviewed. Device transmission sent per MD orders.     Device: Zweemie [id number] Beatrice XT DR DIA  Normal Device Function  Mode: AAIR>DDDR  bpm  AP: 73.5%  : 0.1%  Presenting EGM: AF with vent rate ~ 100-110 bpm  Short V-V intervals: 0  Lead Trends Appear Stable: Yes  Estimated battery longevity to RRT = 10.1 years  Atrial Arrhythmia: 2 AT/AF episodes recorded - 13 sec - > 9 hours in duration. Episode remains in progress since 5/29/23 @ 2132.  AF [place]: 0.4%  Anticoagulant: None - Patient reports she is taking Plavix and ASA.  Ventricular Arrhythmia: 0  Pt Notified of Transmission Results: Yes, via telephone. Patient reports she is not feeling well and has Covid. Patient reports that she has been sick since Sunday 5/28/23. Dr. Santoyo notified.     Plan: Device follow-up every 3 months.  GERALDO Gomez RN     Device interrogation 11/29/22: 71% AP. No AF. Normal device function.    Coronary angiogram/RHC/PCI 5/27/22:  90% dRCA in-stent -> PCI+ALVAREZ   50-60% pRCA (return for hemodynamic assessment if symptoms persist/recur)  50-60% small LCx (medical management recommended    RA 13 RV 46/13 PA 46/16  (26) PCW 15  Jamila CO 4.76 CI 2.6  PVR by Jamila CO 2.3    TTE 05/17/22: LVEF:60-65%, normal RV function, moderate MR, moderate TR, normal IVC    Device check 3/25/22: Normal device function. AP: 78% : <0.1% Presenting EGM: AP-VS @ 83 bpm. 2 seconds of AT/AF. <0.1% AF burden. No ventricular arrhythmias.       Device check 05/11/21: Normal device function. 86.3% AP <1% . Intrinsic rhythm sinus bradycardia at 48 bpm. No atrial or ventricular arrhythmias. Lead trends stable.    TTE 10/15/19:   Normal LV/RV function, LVEF=60-65%.   At least moderate MR and moderate-to-severe TR.  RVSP~39+RAP~15 mmHg.  Compared to 7/24/13: MR and TR have worsened.      Assessment and Plan:     1. CAD s/p multiple PCIs as above (last to RPDA 7/23/13, PCI+DESx1 to dRCA 5/27/22, ISR of dRCA s/p PCI 7/2023, mLAD 10/2023):  Dyspnea improved significantly post-PCI 6/2022-- likely her anginal equivalent (near-normal PCW on RHC at the time of coronary angiogram/PCI supports this.) Had recurrent exertional dyspnea 7/2023, found to have ISR of mid to distal RCA, s/p PCI with 2 ALVAREZ. Post cath still experiencing SOB with exertion and upper back pain with exertion, resolves with rest. Underwent LAD PCI with significant improvement in breathing, still have back pain, unlikely cardiac.   - Continue Plavix and Eliquis, stop aspirin   - Continue Crestor 20 mg daily     2. Paroxysmal atrial fibrillation:  - [id number] of 5 (HTN, age, CAD, female) indicating high risk of stroke  - Eliquis 2.5 mg BID (reduced dose due to CKD, age)  - Continue rate control with metoprolol 100 mg BID     3. Moderate mitral regurgitation, stable by TTE 10/9/2023  4. Mild tricuspid regurgitation, improved by TTE 10/9/2023  - Continue lasix 40 mg daily, extra 40 mg PRN  - Continue Imdur 40 TID and start hydral 30 mg TID for afterload reduction and BP management   - Will continue to follow clinically and with periodic TTE     5. Renal Hypertension, controlled  6. CKD stage  4  - Currently on amlodipine 10 mg, Imdur 120 mg daily, hydralazine 30 mg TID   -followed by Odilia Martin and [facility]     7. Sick sinus syndrome s/p dual chamber PPM:  -routine device clinic follow up    RTC:    Labs today   Follow-up Dr. Santoyo 12/2023      JI Loyola, CNP  Structural Heart Nurse Practitioner  [facility]  Clinic: [phone]  Pager: [phone]

## 2023-11-20 NOTE — NURSING NOTE
Chief Complaint   Patient presents with    Follow Up     2 week angio follow up       Vitals were taken, medications reviewed.    Kristen Heath, EMT   8:57 AM

## 2023-11-20 NOTE — PATIENT INSTRUCTIONS
You were seen today in the Cardiovascular Clinic at the [facility].    Cardiology provider you saw during your visit Randa Ann NP.    Stop aspirin, continue plavix and eliquis   2. Have labs drawn today   3. Please follow-up with Dr. Santoyo in December    Questions and [phone].   First press #1 for the University and then press #3 for Medical Questions to reach the Cardiology triage nurse.     On Call Cardiologist for after hours or on weekends: [phone], press option #4 and ask to speak to the on-call Cardiologist.

## 2023-11-22 ENCOUNTER — OFFICE VISIT (OUTPATIENT)
Dept: PHYSICAL MEDICINE AND REHAB | Facility: CLINIC | Age: 88
End: 2023-11-22
Attending: FAMILY MEDICINE
Payer: COMMERCIAL

## 2023-11-22 VITALS
OXYGEN SATURATION: 99 % | SYSTOLIC BLOOD PRESSURE: 134 MMHG | HEIGHT: 67 IN | DIASTOLIC BLOOD PRESSURE: 62 MMHG | WEIGHT: 157.4 LBS | HEART RATE: 85 BPM | BODY MASS INDEX: 24.71 KG/M2

## 2023-11-22 DIAGNOSIS — M54.50 ACUTE RIGHT-SIDED LOW BACK PAIN WITHOUT SCIATICA: Primary | ICD-10-CM

## 2023-11-22 DIAGNOSIS — M60.88 OTHER MYOSITIS, OTHER SITE: ICD-10-CM

## 2023-11-22 DIAGNOSIS — M79.18 MYOFASCIAL PAIN SYNDROME OF LUMBAR SPINE: ICD-10-CM

## 2023-11-22 DIAGNOSIS — M54.9 UPPER BACK PAIN: ICD-10-CM

## 2023-11-22 PROCEDURE — 99204 OFFICE O/P NEW MOD 45 MIN: CPT | Performed by: STUDENT IN AN ORGANIZED HEALTH CARE EDUCATION/TRAINING PROGRAM

## 2023-11-22 ASSESSMENT — PAIN SCALES - GENERAL: PAINLEVEL: WORST PAIN (10)

## 2023-11-22 NOTE — PATIENT INSTRUCTIONS
~Spine Center Scheduling #[phone].  ~Please call our [facility] Spine Nurse Navigation #[phone] with any questions or concerns about your treatment plan, if symptoms worsen and you would like to be seen urgently, or if you have problems controlling bladder and bowel function.  ~For any future flareups or new symptoms, recommend follow-up in clinic or contact the nurse navigator line.  ~Please note that any My Chart messages may take multiple days for a response due to the high volume of patients seen in clinic.  Anything sent Friday night or after will be answered the following week when able.     An injection has been ordered today to potentially help with your pain symptoms. These injections do not fix what is going on in your back, therefore they typically do not take away the pain completely, however they can many times help improve symptoms. Injections should always be completed along with other modalities such as physical therapy for the best long term outcomes. If injections alone are done, then pain will likely return.     [facility] Injection Requirements    A  is required for all fluoroscopically-guided injections.  Injection appointments may be cancelled if there are signs/symptoms of an active infection or if the patient is being actively treated with antibiotics for a diagnosed infection.  Patients may have their steroid injection cancelled if they have had another steroid injection within 2 weeks.  Diabetic patients will have their blood glucose levels checked the day of their injection and the appointment will be rescheduled if the blood glucose level is 300 or higher.  Patients with allergies to cortisone, local anesthetics, iodine, or contrast dye should contact the Spine Center to further discuss these considerations.  Patients scheduled for medial branch block diagnostic injections should refrain from taking pain medication the day of the  procedure.  The medial branch block injection appointment will be rescheduled if the patient's pain rating is not 5/10 or greater at the time of the procedure.  Patients taking warfarin/Coumadin will have their INR checked the day of the procedure and the procedure may be rescheduled if the INR is greater than 3.0.  Please contact the Spine Center (#[phone]) if you are taking any prescription blood-thinning medications (warfarin, Plavix, Lovenox, Eliquis, Brilinta, Effient, etc.) as special dosing adjustments may need to be made depending on the type of injection you are scheduled to receive.  It is recommended that you delay having your steroid injection if you have received a flu shot or shingles vaccine within 2 weeks.

## 2023-11-22 NOTE — PROGRESS NOTES
ASSESSMENT:  Tessa Mansfield is a 88 year old female presents for consultation at the request of Pedro Gomez who presents today for new patient evaluation of :         Diagnoses and all orders for this visit:  Acute right-sided low back pain without sciatica  Upper back pain  -     Spine  Referral  Myofascial pain syndrome of lumbar spine  Other myositis, other site       Patient is neurologically intact on exam. No myelopathic or red flag symptoms.    Patient's presentation, with focal right sided myofascial low back pain and associated tenderness over the area of quadratus lumborum or serratus posterior, suggests a myofascial process. Her imaging is reassuring without evidence of acute fracture of dislocation. Patient and I discussed possible treatment options and she would like to proceed with trigger point injection and PT following that. Unable to do injection today due to clinic staffing. Coordination of care done with my colleague Dr. Groves who will be here Friday and is available for injection which will be done with ultrasound guidance.    PLAN:  Reviewed spine anatomy and disease process. Discussed diagnosis and treatment options with the patient today. A shared decision making model was used. The patient's values and choices were respected. The following represents what was discussed and decided upon by the provider and the patient.    1. DIAGNOSTIC TESTS  No new imaging orders at this time.    2. PHYSICAL THERAPY  Physical therapy was ordered through [place] or the external clinic of patient's choice.  Discussed the importance of core strengthening, ROM, stretching exercises with the patient and how each of these entities is important in decreasing pain.  Explained to the patient that the purpose of physical therapy is to teach the patient a home exercise program.  These exercises need to be performed every day in order to decrease pain and prevent future occurrences of pain.   Likened it to brushing one's teeth.      3. MEDICATIONS:  Discussed multiple medication options today with patient. Discussed risks, side effects, and proper use of medications. Patient verbalized understanding.  No new medications ordered at this visit.  Patient advised to call our clinic's nurse navigation line (number provided) if they experience any side effects or intolerances with prescribed medication.    4. INTERVENTIONS:  Ultrasound-guided TPI of right serratus posterior and possibly quadratus lumborum  Will be performed with Dr. Groves 11/24  Patient is on Plavix and Eliquis with extensive cardiac history, OK to continue. Patient aware of slightly elevated risk of procedural bleeding or bleeding complications.  Patient aware she will need a   Risks, benefits, and alternatives to injection were discussed in detail with the patient. Potential benefits noted including improvements in pain, sleep, and functioning. Potential risks detailed including but not limited to bleeding, bruising, skin or deep tissue infection, allergic reaction, hematoma, pneumothorax or hemothorax, injury to local structures, worsening or non-improvement of pain.  Patient endorsed understanding of risks and benefits and wishes to proceed.    5. OTHER REFERRALS:  No other referrals at this time.    6. FOLLOW-UP  After completion of physical therapy.  Patient advised to contact our office for earlier appointment if symptoms worsening or not improving, or any side effects are noticed.    Advised patient to call the Spine Center if symptoms worsen or you have problems controlling bladder and bowel function.   ______________________________________________________________________    SUBJECTIVE:   Tessa Mansfield  is a 88 year old female who presents today for new patient evaluation.    She reports about 2-3 weeks ago she had onset of acute mid back pain with no identifiable antecedent trauma. In the past, such pain had been a sign  of a heart attack so she went to the hospital for evaluation. She underwent PCI and was found to have a blockage with stenting. Prior to the cardiac intervention she was also having trouble breathing, but since then that has improved. The pain in her back however has not, so she presents to our Spine Clinic for further evaluation.    Pain is focally located on the right side at the inferior costal margin. It does not radiate along rib lines or down into legs. It is not present on the left side. There is no associated rash in the area, though she notes a longstanding diffuse rash in her back that she will be seeing her PCP about.    Severity: Variable dependent on positioning and activity  Laterality: Right  Radiation: No  Worse with: Movement, bending  Better with: Rest  Numbness: No  Weakness: No    No prior back surgeries    -Treatment to Date: none       -Medications:    Current Outpatient Medications   Medication    allopurinol (ZYLOPRIM) 100 MG tablet    amLODIPine (NORVASC) 10 MG tablet    apixaban ANTICOAGULANT (ELIQUIS) 2.5 MG tablet    aspirin (ASA) 325 MG EC tablet    budesonide (PULMICORT) 0.5 MG/2ML neb solution    clopidogrel (PLAVIX) 75 MG tablet    cyanocolbalamin (VITAMIN B-12) 1000 MCG tablet    ferrous sulfate (FEROSUL) 325 (65 Fe) MG tablet    fluticasone-salmeterol (ADVAIR) 250-50 MCG/ACT inhaler    furosemide (LASIX) 40 MG tablet    hydrALAZINE (APRESOLINE) 10 MG tablet    isosorbide mononitrate (ISMO/MONOKET) 20 MG tablet    metoprolol tartrate (LOPRESSOR) 100 MG tablet    Multiple Vitamins-Minerals (PRESERVISION AREDS 2+MULTI VIT PO)    omeprazole (PRILOSEC) 40 MG DR capsule    potassium chloride ER (K-TAB/KLOR-CON) 10 MEQ CR tablet    rosuvastatin (CRESTOR) 20 MG tablet    timolol maleate (TIMOPTIC) 0.5 % ophthalmic solution    VITAMIN D3 25 MCG (1000 UT) tablet     No current facility-administered medications for this visit.       Allergies   Allergen Reactions    Ciprofloxacin Rash     "Codeine Sulfate Itching    Shrimp Swelling    Bactrim [Sulfamethoxazole W/Trimethoprim]      Patient unable to recall    Biaxin [Clarithromycin]     Chlorthalidone Nausea and Vomiting    Clonidine     Darvon [Propoxyphene Hcl]     Dilaudid [Hydromorphone] Visual Disturbance and Hallucination    Gabapentin Fatigue and Confusion     \"felt drunk\"    Indomethacin     Levaquin [Levofloxacin Hemihydrate]     Morphine Sulfate     Percocet [Oxycodone-Acetaminophen] Hallucination    Pregabalin Itching and Fatigue    Simvastatin Palpitations     Muscle weakness, leg cramping      Spironolactone      Dehydrated      Terazosin        Past Medical History:   Diagnosis Date    CAD (coronary artery disease)     CAD s/p PCI+BMS to MRCA 10/2002, PCI to mLCX 2/2003, PCI+DESx2 to pLAD 11/2007, PCI+DESx1 to dRCA 12/2007, PCI+ALVAREZ to RPDA 7/2013; on indefinite DAPT  10/27/2002    10/27/2002: AMI s/p PCI+BMS (3.5x18mm Bx velocity) to mRCA 2/5/2003: Back pain; PCI+stent to mLCX c/b distal embolization/slow flow 4/11/2003: Unstable angina; PCI+stent (Hepacoat velocity) to mLAD 11/27/2007: PCI+DESx2 to pLAD (IVUS w/ calcification of LMCA and ulcerated plaque pLAD, 80% dRCA; also had 80% dLCX, too small to stent) 12/11/2007: Staged PCI. PCI+DESx1 (3.5x13mm Cypher) to dRCA (indefinite DAPT recommended at this time) 6/27/2008: Angina. mLCX stent 70% ISR. LAD and RCA stents patent. Myocardium at risk from LCX felt to be small, medical management preferred to PCI. 11/10/2009: Angina. Findings unchanged from 6/27/2008, FFR LAD 0.90. Medical management recommended. 7/23/2013: Unstable angina; PCI+ALVAREZ to mPDA. Diagnostic findings: LMCA 40% distal. LAD: pLAD stents patent mLAD 30% ISR dLAD 50% diffuse, D2 diffuse disease. LCX 80% mid ISR. RCA diffuse <30% mid, RPLAs diffuse disease, RPDA 100% occlusion.      Cardiac Pacemaker- Medtronic, dual chamber- NOT dependant 11/28/2007    CKD (chronic kidney disease), stage IV (H)     Hyperlipidemia LDL goal " <70     Hypertension     Stented coronary artery 10-    RCA    Stented coronary artery 2-5-2003    LCx    Stented coronary artery 4-    LAD    Stented coronary artery 11-    LAD    Stented coronary artery 12-    RCA    Transient complete heart block (H) 11/28/2007        Patient Active Problem List   Diagnosis    Degeneration of cervical intervertebral disc    Nonallopathic lesion of cervical region    Nonallopathic lesion of thoracic region    Coronary artery disease of native artery of native heart with stable angina pectoris (H24)    Dizziness and giddiness    Edema    Esophageal reflux    Gout    Essential hypertension    Obstructive sleep apnea    Osteomalacia    Post-menopausal osteoporosis    Cardiac Pacemaker- Medtronic, dual chamber- NOT dependant    Transient complete heart block (H)    Sinus node dysfunction (H)    Disturbance of skin sensation    NSTEMI (non-ST elevated myocardial infarction) (H)    Arrhythmia    Sick sinus syndrome (H)    Non-rheumatic mitral regurgitation    Non-rheumatic tricuspid valve insufficiency    CKD (chronic kidney disease) stage 4, GFR 15-29 ml/min (H)    Status post coronary angiogram    Secondary renal hyperparathyroidism (H24)    Thrombocytopenia (H24)    Renal hypertension    Acute on chronic diastolic (congestive) heart failure (H)    Chest pain, unspecified type    Long term (current) use of anticoagulants    Melena    General weakness    S/P coronary artery stent placement    Antiplatelet or antithrombotic long-term use    Anemia, unspecified type    Aftercare following surgery of the musculoskeletal system    Closed right femoral fracture (H)    Hyperlipidemia    Hypokalemia    Orthostatic hypotension    Osteoarthritis of right patellofemoral joint    Postoperative anemia due to acute blood loss    Chronic anemia    ACP (advance care planning)    Chronic kidney disease, stage 3 (H)    Biomechanical lesion, unspecified    GERD  (gastroesophageal reflux disease)       Past Surgical History:   Procedure Laterality Date    CHOLECYSTECTOMY      CV CORONARY ANGIOGRAM N/A 6/17/2022    Procedure: Coronary Angiogram;  Surgeon: Constantine Macias MD;  Location:  HEART CARDIAC CATH LAB    CV CORONARY ANGIOGRAM N/A 7/17/2023    Procedure: Coronary Angiogram;  Surgeon: Constantine Macias MD;  Location:  HEART CARDIAC CATH LAB    CV PCI N/A 6/17/2022    Procedure: Percutaneous Coronary Intervention;  Surgeon: Constantine Macias MD;  Location:  HEART CARDIAC CATH LAB    CV PCI N/A 7/17/2023    Procedure: Percutaneous Coronary Intervention;  Surgeon: Constantine Macias MD;  Location:  HEART CARDIAC CATH LAB    CV PCI N/A 11/6/2023    Procedure: Percutaneous Coronary Intervention;  Surgeon: Constantine Macias MD;  Location:  HEART CARDIAC CATH LAB    CV RIGHT HEART CATH MEASUREMENTS RECORDED N/A 6/17/2022    Procedure: Right Heart Catheterization;  Surgeon: Constantine Macias MD;  Location: [facility] CARDIAC CATH LAB    EP PACEMAKER N/A 10/15/2019    Procedure: EP PACEMAKER;  Surgeon: Anthony Zhu MD;  Location: [facility] CARDIAC CATH LAB    ESOPHAGOSCOPY, GASTROSCOPY, DUODENOSCOPY (EGD), COMBINED N/A 7/20/2023    Procedure: Esophagoscopy, gastroscopy, duodenoscopy (EGD), combined;  Surgeon: Bekah Dash DO;  Location:  GI    HC PPM INSERTION NEW/REPLACEMENT W/ ATRIAL&VENTRICULAR LEAD  11-    HYSTERECTOMY         Family History   Problem Relation Age of Onset    Cancer Sister 82        bladder cancer       Reviewed past medical, surgical, and family history with patient found on new patient intake packet located in EMR Media tab.     SOCIAL HX: Reviewed    ROS: Specifically negative for bowel/bladder dysfunction, balance changes, headache, dizziness, foot drop, fevers, chills, appetite changes, nausea/vomiting, unexplained weight loss. Otherwise 13 systems reviewed are negative. Please see the  "patient's intake questionnaire from today for details.    OBJECTIVE:  /62 (BP Location: Left arm, Patient Position: Sitting, Cuff Size: Adult Regular)   Pulse 85   Ht 5' 6.5\" (1.689 m)   Wt 157 lb 6.4 oz (71.4 kg)   SpO2 99%   BMI 25.02 kg/m      PHYSICAL EXAMINATION:  --CONSTITUTIONAL: Vital signs as above. No acute distress. The patient is well nourished and well groomed.  --PSYCHIATRIC: The patient is awake, alert, oriented to person, place, time and answering questions appropriately with clear speech. Appropriate mood and affect   --RESPIRATORY: Normal rhythm and effort. No abnormal accessory muscle breathing patterns noted.   --GROSS MOTOR: Easily arises from a seated position.  --LUMBAR SPINE: Inspection reveals no evidence of deformity. Range of motion is not limited in flexion, extension, lateral rotation. Focal tenderness to palpation in right mid/lower back at inferior costal margin. No associated swelling or warmth.  --HIPS: Full range of motion bilaterally. Negative DOMINIC test.  --LOWER EXTREMITY MOTOR TESTING:  Hip flexion left 5/5, right 5/5  Knee extension left 5/5, right 5/5  Ankle dorsiflexors left 5/5, right 5/5  Ankle plantarflexors left 5/5, right 5/5   Great toe extension left 5/5, right 5/5   Knee flexion left 5/5, right 5/5  --NEUROLOGIC: Sensation to lower extremities is intact bilaterally in L2-S1 dermatomes.  Negative Glass's bilaterally.    --VASCULAR: Warm upper limbs bilaterally. Capillary refill in the upper extremities is less than 1 second.  --SKIN: Clear vesicular/bullous rash noted on back without any associated drainage, erythema, or induration. Not involving the focal area of tenderness on right side.    RESULTS: Available medical records from [facility] and any other outside records were reviewed today.     Imaging:  Available relevant imaging was personally reviewed and interpreted today. The images were shown to the patient and the findings were explained " using a spine model.    XR Thoracic Spine 3 Views    Result Date: 11/15/2023  Exam: XR THORACIC SPINE 3 VIEWS, 11/15/2023 1:42 PM History: Upper back pain Comparison: 7/15/2022 Findings: Standing  AP, lateral and swimmer views of the thoracic spine were obtained. 12 rib bearing vertebral bodies are identified. There is no acute osseous abnormality.  Multilevel degenerative changes of the cervical and thoracic spine. Grade 1 anterolisthesis of C3 on C4 and C4 on C5. The visualized lungs are clear. Cardiomediastinal silhouette is within normal limits. Stable pacer leads over the right atrium and ventricle. Coronary stenting. Vascular calcifications. IVC filter in similar position.     Impression: 1. No acute osseous abnormality. 2. Similar multilevel degenerative changes. SAMMIE CHRISTIANSON MD   SYSTEM ID:  [id number]

## 2023-11-22 NOTE — LETTER
11/22/2023         RE: Tessa Mansfield  [address]  [facility] [address]        Dear Colleague,    Thank you for referring your patient, Tessa Mansfield, to the [facility]. Please see a copy of my visit note below.    ASSESSMENT:  Tessa Mansfield is a 88 year old female presents for consultation at the request of Pedro Gomez who presents today for new patient evaluation of :         Diagnoses and all orders for this visit:  Acute right-sided low back pain without sciatica  Upper back pain  -     Spine  Referral  Myofascial pain syndrome of lumbar spine  Other myositis, other site       Patient is neurologically intact on exam. No myelopathic or red flag symptoms.    Patient's presentation, with focal right sided myofascial low back pain and associated tenderness over the area of quadratus lumborum or serratus posterior, suggests a myofascial process. Her imaging is reassuring without evidence of acute fracture of dislocation. Patient and I discussed possible treatment options and she would like to proceed with trigger point injection and PT following that. Unable to do injection today due to clinic staffing. Coordination of care done with my colleague Dr. Groves who will be here Friday and is available for injection which will be done with ultrasound guidance.    PLAN:  Reviewed spine anatomy and disease process. Discussed diagnosis and treatment options with the patient today. A shared decision making model was used. The patient's values and choices were respected. The following represents what was discussed and decided upon by the provider and the patient.    1. DIAGNOSTIC TESTS  No new imaging orders at this time.    2. PHYSICAL THERAPY  Physical therapy was ordered through [place] or the external clinic of patient's choice.  Discussed the importance of core strengthening, ROM, stretching exercises with the patient and how each of these entities is  important in decreasing pain.  Explained to the patient that the purpose of physical therapy is to teach the patient a home exercise program.  These exercises need to be performed every day in order to decrease pain and prevent future occurrences of pain.  Likened it to brushing one's teeth.      3. MEDICATIONS:  Discussed multiple medication options today with patient. Discussed risks, side effects, and proper use of medications. Patient verbalized understanding.  No new medications ordered at this visit.  Patient advised to call our clinic's nurse navigation line (number provided) if they experience any side effects or intolerances with prescribed medication.    4. INTERVENTIONS:  Ultrasound-guided TPI of right serratus posterior and possibly quadratus lumborum  Will be performed with Dr. Groves 11/24  Patient is on Plavix and Eliquis with extensive cardiac history, OK to continue. Patient aware of slightly elevated risk of procedural bleeding or bleeding complications.  Patient aware she will need a   Risks, benefits, and alternatives to injection were discussed in detail with the patient. Potential benefits noted including improvements in pain, sleep, and functioning. Potential risks detailed including but not limited to bleeding, bruising, skin or deep tissue infection, allergic reaction, hematoma, pneumothorax or hemothorax, injury to local structures, worsening or non-improvement of pain.  Patient endorsed understanding of risks and benefits and wishes to proceed.    5. OTHER REFERRALS:  No other referrals at this time.    6. FOLLOW-UP  After completion of physical therapy.  Patient advised to contact our office for earlier appointment if symptoms worsening or not improving, or any side effects are noticed.    Advised patient to call the Spine Center if symptoms worsen or you have problems controlling bladder and bowel function.    ______________________________________________________________________    SUBJECTIVE:   Tessa Mansfield  is a 88 year old female who presents today for new patient evaluation.    She reports about 2-3 weeks ago she had onset of acute mid back pain with no identifiable antecedent trauma. In the past, such pain had been a sign of a heart attack so she went to the hospital for evaluation. She underwent PCI and was found to have a blockage with stenting. Prior to the cardiac intervention she was also having trouble breathing, but since then that has improved. The pain in her back however has not, so she presents to our Spine Clinic for further evaluation.    Pain is focally located on the right side at the inferior costal margin. It does not radiate along rib lines or down into legs. It is not present on the left side. There is no associated rash in the area, though she notes a longstanding diffuse rash in her back that she will be seeing her PCP about.    Severity: Variable dependent on positioning and activity  Laterality: Right  Radiation: No  Worse with: Movement, bending  Better with: Rest  Numbness: No  Weakness: No    No prior back surgeries    -Treatment to Date: none       -Medications:    Current Outpatient Medications   Medication     allopurinol (ZYLOPRIM) 100 MG tablet     amLODIPine (NORVASC) 10 MG tablet     apixaban ANTICOAGULANT (ELIQUIS) 2.5 MG tablet     aspirin (ASA) 325 MG EC tablet     budesonide (PULMICORT) 0.5 MG/2ML neb solution     clopidogrel (PLAVIX) 75 MG tablet     cyanocolbalamin (VITAMIN B-12) 1000 MCG tablet     ferrous sulfate (FEROSUL) 325 (65 Fe) MG tablet     fluticasone-salmeterol (ADVAIR) 250-50 MCG/ACT inhaler     furosemide (LASIX) 40 MG tablet     hydrALAZINE (APRESOLINE) 10 MG tablet     isosorbide mononitrate (ISMO/MONOKET) 20 MG tablet     metoprolol tartrate (LOPRESSOR) 100 MG tablet     Multiple Vitamins-Minerals (PRESERVISION AREDS 2+MULTI VIT PO)     omeprazole  "(PRILOSEC) 40 MG DR capsule     potassium chloride ER (K-TAB/KLOR-CON) 10 MEQ CR tablet     rosuvastatin (CRESTOR) 20 MG tablet     timolol maleate (TIMOPTIC) 0.5 % ophthalmic solution     VITAMIN D3 25 MCG (1000 UT) tablet     No current facility-administered medications for this visit.       Allergies   Allergen Reactions     Ciprofloxacin Rash     Codeine Sulfate Itching     Shrimp Swelling     Bactrim [Sulfamethoxazole W/Trimethoprim]      Patient unable to recall     Biaxin [Clarithromycin]      Chlorthalidone Nausea and Vomiting     Clonidine      Darvon [Propoxyphene Hcl]      Dilaudid [Hydromorphone] Visual Disturbance and Hallucination     Gabapentin Fatigue and Confusion     \"felt drunk\"     Indomethacin      Levaquin [Levofloxacin Hemihydrate]      Morphine Sulfate      Percocet [Oxycodone-Acetaminophen] Hallucination     Pregabalin Itching and Fatigue     Simvastatin Palpitations     Muscle weakness, leg cramping       Spironolactone      Dehydrated       Terazosin        Past Medical History:   Diagnosis Date     CAD (coronary artery disease)      CAD s/p PCI+BMS to MRCA 10/2002, PCI to mLCX 2/2003, PCI+DESx2 to pLAD 11/2007, PCI+DESx1 to dRCA 12/2007, PCI+ALVAREZ to RPDA 7/2013; on indefinite DAPT  10/27/2002    10/27/2002: [facility] Back pain; PCI+stent to mLCX c/b distal embolization/slow flow 4/11/2003: Unstable angina; PCI+stent (Hepacoat velocity) to mLAD 11/27/2007: PCI+DESx2 to pLAD (IVUS w/ calcification of LMCA and ulcerated plaque pLAD, 80% dRCA; also had 80% dLCX, too small to stent) 12/11/2007: Staged PCI. PCI+DESx1 (3.5x13mm Cypher) to dRCA (indefinite DAPT recommended at this time) 6/27/2008: Angina. mLCX stent 70% ISR. LAD and RCA stents patent. Myocardium at risk from LCX felt to be small, medical management preferred to PCI. 11/10/2009: Angina. Findings unchanged from 6/27/2008, FFR LAD 0.90. Medical management recommended. 7/23/2013: Unstable " angina; PCI+ALVAREZ to mPDA. Diagnostic findings: LMCA 40% distal. LAD: pLAD stents patent mLAD 30% ISR dLAD 50% diffuse, D2 diffuse disease. LCX 80% mid ISR. RCA diffuse <30% mid, RPLAs diffuse disease, RPDA 100% occlusion.       Cardiac Pacemaker- Medtronic, dual chamber- NOT dependant 11/28/2007     CKD (chronic kidney disease), stage IV (H)      Hyperlipidemia LDL goal <70      Hypertension      Stented coronary artery 10-    RCA     Stented coronary artery 2-5-2003    LCx     Stented coronary artery 4-    LAD     Stented coronary artery 11-    LAD     Stented coronary artery 12-    RCA     Transient complete heart block (H) 11/28/2007        Patient Active Problem List   Diagnosis     Degeneration of cervical intervertebral disc     Nonallopathic lesion of cervical region     Nonallopathic lesion of thoracic region     Coronary artery disease of native artery of native heart with stable angina pectoris (H24)     Dizziness and giddiness     Edema     Esophageal reflux     Gout     Essential hypertension     Obstructive sleep apnea     Osteomalacia     Post-menopausal osteoporosis     Cardiac Pacemaker- Medtronic, dual chamber- NOT dependant     Transient complete heart block (H)     Sinus node dysfunction (H)     Disturbance of skin sensation     NSTEMI (non-ST elevated myocardial infarction) (H)     Arrhythmia     Sick sinus syndrome (H)     Non-rheumatic mitral regurgitation     Non-rheumatic tricuspid valve insufficiency     CKD (chronic kidney disease) stage 4, GFR 15-29 ml/min (H)     Status post coronary angiogram     Secondary renal hyperparathyroidism (H24)     Thrombocytopenia (H24)     Renal hypertension     Acute on chronic diastolic (congestive) heart failure (H)     Chest pain, unspecified type     Long term (current) use of anticoagulants     Melena     General weakness     S/P coronary artery stent placement     Antiplatelet or antithrombotic long-term use     Anemia,  unspecified type     Aftercare following surgery of the musculoskeletal system     Closed right femoral fracture (H)     Hyperlipidemia     Hypokalemia     Orthostatic hypotension     Osteoarthritis of right patellofemoral joint     Postoperative anemia due to acute blood loss     Chronic anemia     ACP (advance care planning)     Chronic kidney disease, stage 3 (H)     Biomechanical lesion, unspecified     GERD (gastroesophageal reflux disease)       Past Surgical History:   Procedure Laterality Date     CHOLECYSTECTOMY       CV CORONARY ANGIOGRAM N/A 6/17/2022    Procedure: Coronary Angiogram;  Surgeon: Constantine Macias MD;  Location: [facility] CARDIAC CATH LAB     CV CORONARY ANGIOGRAM N/A 7/17/2023    Procedure: Coronary Angiogram;  Surgeon: Constantine Macias MD;  Location: [facility] CARDIAC CATH LAB     CV PCI N/A 6/17/2022    Procedure: Percutaneous Coronary Intervention;  Surgeon: Constantine Macias MD;  Location: [facility] CARDIAC CATH LAB     CV PCI N/A 7/17/2023    Procedure: Percutaneous Coronary Intervention;  Surgeon: Constantine Macias MD;  Location: [facility] CARDIAC CATH LAB     CV PCI N/A 11/6/2023    Procedure: Percutaneous Coronary Intervention;  Surgeon: Constantine Macias MD;  Location: [facility] CARDIAC CATH LAB     CV RIGHT HEART CATH MEASUREMENTS RECORDED N/A 6/17/2022    Procedure: Right Heart Catheterization;  Surgeon: Constantine Macias MD;  Location: [facility] CARDIAC CATH LAB     EP PACEMAKER N/A 10/15/2019    Procedure: EP PACEMAKER;  Surgeon: Anthony Zhu MD;  Location: [facility] CARDIAC CATH LAB     ESOPHAGOSCOPY, GASTROSCOPY, DUODENOSCOPY (EGD), COMBINED N/A 7/20/2023    Procedure: Esophagoscopy, gastroscopy, duodenoscopy (EGD), combined;  Surgeon: Bekah Dash DO;  Location:  GI     HC PPM INSERTION NEW/REPLACEMENT W/ ATRIAL&VENTRICULAR LEAD  11-     HYSTERECTOMY         Family History   Problem Relation Age of Onset     Cancer Sister 82      "   bladder cancer       Reviewed past medical, surgical, and family history with patient found on new patient intake packet located in EMR Media tab.     SOCIAL HX: Reviewed    ROS: Specifically negative for bowel/bladder dysfunction, balance changes, headache, dizziness, foot drop, fevers, chills, appetite changes, nausea/vomiting, unexplained weight loss. Otherwise 13 systems reviewed are negative. Please see the patient's intake questionnaire from today for details.    OBJECTIVE:  /62 (BP Location: Left arm, Patient Position: Sitting, Cuff Size: Adult Regular)   Pulse 85   Ht 5' 6.5\" (1.689 m)   Wt 157 lb 6.4 oz (71.4 kg)   SpO2 99%   BMI 25.02 kg/m      PHYSICAL EXAMINATION:  --CONSTITUTIONAL: Vital signs as above. No acute distress. The patient is well nourished and well groomed.  --PSYCHIATRIC: The patient is awake, alert, oriented to person, place, time and answering questions appropriately with clear speech. Appropriate mood and affect   --RESPIRATORY: Normal rhythm and effort. No abnormal accessory muscle breathing patterns noted.   --GROSS MOTOR: Easily arises from a seated position.  --LUMBAR SPINE: Inspection reveals no evidence of deformity. Range of motion is not limited in flexion, extension, lateral rotation. Focal tenderness to palpation in right mid/lower back at inferior costal margin. No associated swelling or warmth.  --HIPS: Full range of motion bilaterally. Negative DOMINIC test.  --LOWER EXTREMITY MOTOR TESTING:  Hip flexion left 5/5, right 5/5  Knee extension left 5/5, right 5/5  Ankle dorsiflexors left 5/5, right 5/5  Ankle plantarflexors left 5/5, right 5/5   Great toe extension left 5/5, right 5/5   Knee flexion left 5/5, right 5/5  --NEUROLOGIC: Sensation to lower extremities is intact bilaterally in L2-S1 dermatomes.  Negative Glass's bilaterally.    --VASCULAR: Warm upper limbs bilaterally. Capillary refill in the upper extremities is less than 1 second.  --SKIN: Clear " vesicular/bullous rash noted on back without any associated drainage, erythema, or induration. Not involving the focal area of tenderness on right side.    RESULTS: Available medical records from [facility] and any other outside records were reviewed today.     Imaging:  Available relevant imaging was personally reviewed and interpreted today. The images were shown to the patient and the findings were explained using a spine model.    XR Thoracic Spine 3 Views    Result Date: 11/15/2023  Exam: XR THORACIC SPINE 3 VIEWS, 11/15/2023 1:42 PM History: Upper back pain Comparison: 7/15/2022 Findings: Standing  AP, lateral and swimmer views of the thoracic spine were obtained. 12 rib bearing vertebral bodies are identified. There is no acute osseous abnormality.  Multilevel degenerative changes of the cervical and thoracic spine. Grade 1 anterolisthesis of C3 on C4 and C4 on C5. The visualized lungs are clear. Cardiomediastinal silhouette is within normal limits. Stable pacer leads over the right atrium and ventricle. Coronary stenting. Vascular calcifications. IVC filter in similar position.     Impression: 1. No acute osseous abnormality. 2. Similar multilevel degenerative changes. SAMMIE CHRISTIANSON MD   SYSTEM ID:  [id number]         Again, thank you for allowing me to participate in the care of your patient.        Sincerely,        Felix Marquez MD

## 2023-11-24 ENCOUNTER — TELEPHONE (OUTPATIENT)
Dept: PHYSICAL MEDICINE AND REHAB | Facility: CLINIC | Age: 88
End: 2023-11-24

## 2023-11-24 NOTE — TELEPHONE ENCOUNTER
Patient returned call. Explained the issues with vaccinations and steroid injections. Stated understanding. We will get her rescheduled for next Wednesday, 11/29.

## 2023-11-24 NOTE — TELEPHONE ENCOUNTER
Called patient to inform her that due to recent vaccinations that she received we will be unable to do her steroid injection today. Left voicemail for patient for patient requesting call back to discuss.     Alissa Araujo RN on 11/24/2023 at 8:33 AM

## 2023-12-06 ENCOUNTER — TELEPHONE (OUTPATIENT)
Dept: CARDIOLOGY | Facility: CLINIC | Age: 88
End: 2023-12-06
Payer: COMMERCIAL

## 2023-12-06 NOTE — TELEPHONE ENCOUNTER
Left Voicemail (1st Attempt) for the patient to call back and schedule the following:    Appointment type: return cardiology   Provider: tyler  Return date: 12/19  Specialty phone number: [phone]  Additional appointment(s) needed: device check prior  Additonal Notes: 12/6 lvm first attempt no myc to schedule device check prior SC

## 2023-12-06 NOTE — TELEPHONE ENCOUNTER
----- Message from Rivka Chou RN sent at 2023 12:57 PM CST -----  Regardin/19 appt  Hey,    Any way we could get her in for device check prior to her appt with Dr. Moreno on ?    Thanks,  CAROL Tineo

## 2023-12-21 DIAGNOSIS — M10.00 IDIOPATHIC GOUT, UNSPECIFIED CHRONICITY, UNSPECIFIED SITE: ICD-10-CM

## 2023-12-21 DIAGNOSIS — I12.9 RENAL HYPERTENSION: ICD-10-CM

## 2023-12-21 RX ORDER — HYDRALAZINE HYDROCHLORIDE 10 MG/1
30 TABLET, FILM COATED ORAL 3 TIMES DAILY
Qty: 270 TABLET | Refills: 3 | Status: ON HOLD | OUTPATIENT
Start: 2023-12-21 | End: 2024-03-21

## 2023-12-26 DIAGNOSIS — R05.3 CHRONIC COUGH: ICD-10-CM

## 2023-12-27 ENCOUNTER — TELEPHONE (OUTPATIENT)
Dept: CARDIOLOGY | Facility: CLINIC | Age: 88
End: 2023-12-27
Payer: COMMERCIAL

## 2023-12-27 RX ORDER — ALLOPURINOL 100 MG/1
100 TABLET ORAL DAILY
Qty: 90 TABLET | Refills: 0 | Status: SHIPPED | OUTPATIENT
Start: 2023-12-27 | End: 2024-04-04

## 2023-12-27 NOTE — TELEPHONE ENCOUNTER
[facility] Call Center    Phone Message    May a detailed message be left on voicemail: yes     Reason for Call: Medication Question or concern regarding medication   Prescription Clarification  Name of Medication: apixaban ANTICOAGULANT (ELIQUIS) 2.5 MG tablet   Prescribing Provider: Randa Ann    Pharmacy: [facility] [address] [place] [address] [address]     What on the order needs clarification? Pt requesting a call back to discuss if there is an alternative medication she could be prescribed at this medication is very expensive.       Action Taken: Other: Cardiology    Travel Screening: Not Applicable     Thank you!  [facility]

## 2023-12-27 NOTE — TELEPHONE ENCOUNTER
Allopurinol 100 MG Oral Tablet       Last Written Prescription Date:  9-22-23  Last Fill Quantity: 90,   # refills: 0  Last Office Visit : 11-15-23  Future Office visit:  NONE      Uric Acid   Date Value Ref Range Status   07/08/2016 10.4 (H) 2.6 - 6.0 mg/dL Final      Lab Test 11/20/23  0949   CR 2.31*   11-6-23   GFR Estimate  >60 mL/min/1.73m2 21 Low        Routing refill request to provider for review/approval because:  OVERDUE URIC ACID-  MULTIPLE PREVIOUS MAHAD RF  ABN CR, GFR

## 2023-12-28 ENCOUNTER — TELEPHONE (OUTPATIENT)
Dept: PULMONOLOGY | Facility: CLINIC | Age: 88
End: 2023-12-28
Payer: COMMERCIAL

## 2023-12-28 NOTE — TELEPHONE ENCOUNTER
Patient Contacted for the patient to call back and schedule the following:    Appointment type: AKSHAT  Provider: ZOË  Return date: NEXT AVAIL  Specialty phone number: [phone]  Additional appointment(s) needed: NA  Additonal Notes: FOR RX. Pt unable to schedule now- plan to call next Tuesday anytime except between 1pm-2pm ok.

## 2024-01-02 ENCOUNTER — LAB (OUTPATIENT)
Dept: LAB | Facility: CLINIC | Age: 89
End: 2024-01-02
Attending: INTERNAL MEDICINE
Payer: COMMERCIAL

## 2024-01-02 ENCOUNTER — TELEPHONE (OUTPATIENT)
Dept: PULMONOLOGY | Facility: CLINIC | Age: 89
End: 2024-01-02

## 2024-01-02 ENCOUNTER — ANCILLARY PROCEDURE (OUTPATIENT)
Dept: CARDIOLOGY | Facility: CLINIC | Age: 89
End: 2024-01-02
Attending: INTERNAL MEDICINE
Payer: COMMERCIAL

## 2024-01-02 VITALS
BODY MASS INDEX: 26 KG/M2 | DIASTOLIC BLOOD PRESSURE: 69 MMHG | SYSTOLIC BLOOD PRESSURE: 133 MMHG | HEART RATE: 69 BPM | WEIGHT: 161.8 LBS | OXYGEN SATURATION: 99 % | HEIGHT: 66 IN

## 2024-01-02 DIAGNOSIS — M10.00 IDIOPATHIC GOUT, UNSPECIFIED CHRONICITY, UNSPECIFIED SITE: ICD-10-CM

## 2024-01-02 DIAGNOSIS — I25.10 CORONARY ARTERY DISEASE INVOLVING NATIVE CORONARY ARTERY OF NATIVE HEART WITHOUT ANGINA PECTORIS: Primary | ICD-10-CM

## 2024-01-02 DIAGNOSIS — I12.9 RENAL HYPERTENSION: ICD-10-CM

## 2024-01-02 DIAGNOSIS — I34.0 NON-RHEUMATIC MITRAL REGURGITATION: ICD-10-CM

## 2024-01-02 DIAGNOSIS — I49.5 SINUS NODE DYSFUNCTION (H): ICD-10-CM

## 2024-01-02 DIAGNOSIS — N18.4 CKD (CHRONIC KIDNEY DISEASE) STAGE 4, GFR 15-29 ML/MIN (H): ICD-10-CM

## 2024-01-02 DIAGNOSIS — I49.5 SICK SINUS SYNDROME (H): ICD-10-CM

## 2024-01-02 LAB — URATE SERPL-MCNC: 6.4 MG/DL (ref 2.4–5.7)

## 2024-01-02 PROCEDURE — 84550 ASSAY OF BLOOD/URIC ACID: CPT | Performed by: PATHOLOGY

## 2024-01-02 PROCEDURE — G0463 HOSPITAL OUTPT CLINIC VISIT: HCPCS | Performed by: INTERNAL MEDICINE

## 2024-01-02 PROCEDURE — 93280 PM DEVICE PROGR EVAL DUAL: CPT | Performed by: INTERNAL MEDICINE

## 2024-01-02 PROCEDURE — 99214 OFFICE O/P EST MOD 30 MIN: CPT | Performed by: INTERNAL MEDICINE

## 2024-01-02 PROCEDURE — 36415 COLL VENOUS BLD VENIPUNCTURE: CPT | Performed by: PATHOLOGY

## 2024-01-02 RX ORDER — IBUPROFEN 200 MG
1 CAPSULE ORAL DAILY
Status: ON HOLD | COMMUNITY
End: 2024-05-03

## 2024-01-02 ASSESSMENT — PAIN SCALES - GENERAL: PAINLEVEL: NO PAIN (0)

## 2024-01-02 NOTE — NURSING NOTE
Labs: Patient was given results of the laboratory testing obtained today. Patient demonstrated understanding of this information and agreed to call with further questions or concerns.     Med Reconcile: Reviewed and verified all current medications with the patient. The updated medication list was printed and given to the patient.    Return Appointment: Patient given instructions regarding scheduling next clinic visit. Patient demonstrated understanding of this information and agreed to call with further questions or concerns. Follow up with Dr. Santoyo in 1 year with fasting lipids prior.    Patient stated she understood all health information given and agreed to call with further questions or concerns.    Rivka Chou RN

## 2024-01-02 NOTE — LETTER
1/2/2024      RE: Tessa Mansfield  [address]  [facility] [address]       Dear Colleague,    Thank you for the opportunity to participate in the care of your patient, Tessa Mansfield, at the [facility] [place] [facility]. Please see a copy of my visit note below.    Chief complaint: CAD     HPI:   Tessa Mansfield is a 86 year old female former patient of Dr. Mir with extensive CAD history as detailed below (PCI to all 3 vessels at various times from 0222-3151, last PCA to RPDA 7/23/13 and known 80% ISR of small LCX) on indefinite dual antiplatelet therapy and sick sinus syndrome s/p dual-chamber PPM who presents for follow up of her chronic cardiovascular conditions.      12/10/19:  She underwent pacemaker generator change 10/15/19 due to her prior generator reaching LOLA (new device: Medtronic [id number] [place] XT  MRI dual chamber PPM) without event.      TTE 10/15/19 (my read):   Normal LV/RV function, LVEF=60-65%.   At least moderate MR and moderate-to-severe TR.  RVSP~39+RAP~15 mmHg.  Compared to 7/24/13: MR and TR have worsened.      She reports volume status has been about stable and that she has self-titrated furosemide as needed and has done so for some time.      5/11/21:  Since her last visit, Tessa reports feeling generally well. She does have occasional episodes of angina (pain between her shoulders) for which she takes 1 NTG every 2-3 months, which has been longstanding and stable.      She denies any dyspnea at rest or with exertion, PND, orthopnea, peripheral edema, palpitations, lightheadedness or syncope.      5/17/2022:  Patient states that she has had SOB for the last month. States that she is able to walk 1-2 blocks before developing SOB, prior to this she was able to walk about 4 blocks before becoming SOB.  She denies chest pain, chest pressure, lightheadedness or dizziness.     12/06/22:  Since her last visit, she  underwent coronary angiogram and PCI+ALVAREZ to RCA with dramatic improvement in her exertional dyspnea. She completed cardiac rehab. She does think she could walk somewhat further than before but has not pushed her exercise capacity recently. She plans to start this in in the new year. She has not required NTG at all since her last visit.     She denies any dyspnea at rest or with exertion, PND, orthopnea, peripheral edema, palpitations, lightheadedness or syncope.     01/02/24:  Since her last visit, she had PCI to dRCA ISR 7/2023 and LAD 11/6/23. Her dyspnea is improved, though not completely resolved. She has found apixaban to be expensive and is wondering about alternatives.     Summary of CAD history:  10/27/2002: AMI s/p PCI+BMS (3.5x18mm Bx velocity) to mRCA  2/5/2003: Back pain; PCI+stent to mLCX c/b distal embolization/slow flow  4/11/2003: Unstable angina; PCI+stent (Hepacoat velocity) to mLAD  11/27/2007: PCI+DESx2 to pLAD (IVUS w/ calcification of LMCA and ulcerated plaque pLAD, 80% dRCA; also had 80% dLCX, too small to stent)  12/11/2007: Staged PCI. PCI+DESx1 (3.5x13mm Cypher) to dRCA (indefinite DAPT recommended at this time)  6/27/2008: Angina. mLCX stent 70% ISR. LAD and RCA stents patent. Myocardium at risk from LCX felt to be small, medical management preferred to PCI.  11/10/2009: Angina. Findings unchanged from 6/27/2008, FFR LAD 0.90. Medical management recommended.  7/23/2013: Unstable angina; PCI+ALVAREZ to mPDA. Diagnostic findings: LMCA 40% distal. LAD: pLAD stents patent mLAD 30% ISR dLAD 50% diffuse, D2 diffuse disease. LCX 80% mid ISR. RCA diffuse <30% mid, RPLAs diffuse disease, RPDA 100% occlusion.   5/27/2022: 90% dRCA in-stent -> PCI+DESx1 to dRCA.  50-60% pRCA (return for hemodynamic assessment if symptoms persist/recur) 50-60% small LCx (medical management recommended)  7/18/23: 80% ISR of dRCA --> PCI w/ALVAREZ x2, RPDA-2 lesion is 50% stenosed, RPDA-1 lesion is 65% stenosed, Ost LAD to Prox  LAD lesion is 50% stenosed, Prox LAD to Mid LAD lesion is 40% stenosed, mLAD 70% stenosed, prox Cx to Mid Cx lesion is 55% stenosed small vessel.  11/6/23: LAD PCI             PAST MEDICAL HISTORY:  Past Medical History:   Diagnosis Date    CAD (coronary artery disease)     CAD s/p PCI+BMS to MRCA 10/2002, PCI to mLCX 2/2003, PCI+DESx2 to pLAD 11/2007, PCI+DESx1 to dRCA 12/2007, PCI+ALVAREZ to RPDA 7/2013; on indefinite DAPT  10/27/2002    10/27/2002: AMI s/p PCI+BMS (3.5x18mm Bx velocity) to mRCA 2/5/2003: Back pain; PCI+stent to mLCX c/b distal embolization/slow flow 4/11/2003: Unstable angina; PCI+stent (Hepacoat velocity) to mLAD 11/27/2007: PCI+DESx2 to pLAD (IVUS w/ calcification of LMCA and ulcerated plaque pLAD, 80% dRCA; also had 80% dLCX, too small to stent) 12/11/2007: Staged PCI. PCI+DESx1 (3.5x13mm Cypher) to dRCA (indefinite DAPT recommended at this time) 6/27/2008: Angina. mLCX stent 70% ISR. LAD and RCA stents patent. Myocardium at risk from LCX felt to be small, medical management preferred to PCI. 11/10/2009: Angina. Findings unchanged from 6/27/2008, FFR LAD 0.90. Medical management recommended. 7/23/2013: Unstable angina; PCI+ALVAREZ to mPDA. Diagnostic findings: LMCA 40% distal. LAD: pLAD stents patent mLAD 30% ISR dLAD 50% diffuse, D2 diffuse disease. LCX 80% mid ISR. RCA diffuse <30% mid, RPLAs diffuse disease, RPDA 100% occlusion.      Cardiac Pacemaker- Medtronic, dual chamber- NOT dependant 11/28/2007    CKD (chronic kidney disease), stage IV (H)     Hyperlipidemia LDL goal <70     Hypertension     Stented coronary artery 10-    RCA    Stented coronary artery 2-5-2003    LCx    Stented coronary artery 4-    LAD    Stented coronary artery 11-    LAD    Stented coronary artery 12-    RCA    Transient complete heart block (H) 11/28/2007       CURRENT MEDICATIONS:  Reviewed.     ALLERGIES:     Allergies   Allergen Reactions    Ciprofloxacin Rash    Codeine Sulfate Itching     "Shrimp Swelling    Bactrim [Sulfamethoxazole W/Trimethoprim]      Patient unable to recall    Biaxin [Clarithromycin]     Chlorthalidone Nausea and Vomiting    Clonidine     Darvon [Propoxyphene Hcl]     Dilaudid [Hydromorphone] Visual Disturbance and Hallucination    Gabapentin Fatigue and Confusion     \"felt drunk\"    Indomethacin     Levaquin [Levofloxacin Hemihydrate]     Morphine Sulfate     Percocet [Oxycodone-Acetaminophen] Hallucination    Pregabalin Itching and Fatigue    Simvastatin Palpitations     Muscle weakness, leg cramping      Spironolactone      Dehydrated      Terazosin        FAMILY HISTORY:  Family History   Problem Relation Age of Onset    Cancer Sister 82        bladder cancer       SOCIAL HISTORY:  Social History     Tobacco Use    Smoking status: Former     Packs/day: 0.30     Years: 15.00     Additional pack years: 0.00     Total pack years: 4.50     Types: Cigarettes    Smokeless tobacco: Never    Tobacco comments:     Quit 22+ years ago   Substance Use Topics    Drug use: No       ROS:   A comprehensive 14 point review of systems is negative other than as mentioned in HPI.    Exam:  /69 (BP Location: Right arm, Patient Position: Chair, Cuff Size: Adult Regular)   Pulse 69   Ht 1.665 m (5' 5.55\")   Wt 73.4 kg (161 lb 12.8 oz)   SpO2 99%   BMI 26.47 kg/m    GENERAL APPEARANCE: healthy, alert and no distress  EYES: no icterus, no xanthelasmas  ENT: normal palate, mucosa moist, no central cyanosis  NECK: JVP pre-V 7 (large CV wave)   RESPIRATORY: lungs clear to auscultation - no rales, rhonchi or wheezes, no use of accessory muscles, no retractions, respirations are unlabored, normal respiratory rate  CARDIOVASCULAR: regular rhythm, II/VI systolic murmur LLSB and apex  GI: soft, non tender, bowel sounds normal,no abdominal bruits  EXTREMITIES: no edema, no bruits  NEURO: alert and oriented to person/place/time, normal speech, gait and affect  VASC: Radial, dorsalis pedis and " posterior tibialis pulses 2+ bilaterally.  SKIN: no ecchymoses, no rashes.  PSYCH: cooperative, affect appropriate.     Labs:  Reviewed.       Testing/Procedures:  TTE 10/15/19:   Normal LV/RV function, LVEF=60-65%.   At least moderate MR and moderate-to-severe TR.  RVSP~39+RAP~15 mmHg.  Compared to 7/24/13: MR and TR have worsened.      Device check 05/11/21: Normal device function. 86.3% AP <1% . Intrinsic rhythm sinus bradycardia at 48 bpm. No atrial or ventricular arrhythmias. Lead trends stable.    Device check 3/25/22: Normal device function. AP: 78% : <0.1% Presenting EGM: AP-VS @ 83 bpm. 2 seconds of AT/AF. <0.1% AF burden. No ventricular arrhythmias.     TTE 05/17/22: LVEF:60-65%, normal RV function, moderate MR, moderate TR, normal IVC    Coronary angiogram/RHC/PCI 5/27/22:  90% dRCA in-stent -> PCI+ALVAREZ   50-60% pRCA (return for hemodynamic assessment if symptoms persist/recur)  50-60% small LCx (medical management recommended    RA 13 RV 46/13 PA 46/16 (26) PCW 15  Jamila CO 4.76 CI 2.6  PVR by Jamila CO 2.3  Device interrogation 11/29/22: 71% AP. No AF. Normal device function.      Dr. Santoyo personally visualized:  TTE 10/9/23: Normal LV/RV function, LVEF=55-60%. Mild-mod MR, mild TR, normal IVC. No significant change from 5/2022.          Assessment and Plan:   1. CAD s/p multiple PCIs as above (last to RPDA 7/23/13 and now s/p PCI+DESx1 to dRCA 5/27/22) and known 50-60% pRCA ISR, now with significant improvement in stable angina:   Dyspnea dramatically improved post-PCI-- I believe this was an anginal equivalent (near-normal PCW on RHC at the time of coronary angiogram/PCI supports this.)   Had recurrent exertional dyspnea 7/2023, found to have ISR of mid to distal RCA, s/p PCI with 2 ALVAREZ. Post cath still experiencing SOB with exertion and upper back pain with exertion, resolves with rest. Underwent LAD PCI with significant improvement in breathing, still have back pain, unlikely cardiac.    -continue clopidogrel/DOAC indefinitely  -continue statin  -If she has recurrent symptoms, repeat coronary angiogram and FFR/iFR of 50-60% pRCA lesion would be worth pursuing    2. Paroxysmal atrial fibrillation, [id number]  -continue DOAC   -continue present beta blocker       3. Mild-Moderate mitral regurgitation, stable  4. Moderate tricuspid regurgitation, stable/clinically improved  -no current symptoms, will continue to follow clinically and with periodic TTE       5. Renal Hypertension, controlled  6. CKD stage 4  -continue present therapy  -followed by Odilia Martin and Scar        7. Sick sinus syndrome s/p dual chamber PPM:  -routine device clinic follow up    Tyrell Santoyo MD  Cardiology

## 2024-01-02 NOTE — PATIENT INSTRUCTIONS
"Cardiology Providers you saw during your visit:  Dr. Santoyo    Medication changes:  None    Follow up:  Follow up with Dr. Santoyo in 1 year with fasting labs prior.    Labs:      Please call if you have :  1. Weight gain of more than 2 pounds in a day or 5 pounds in a week  2. Increased shortness of breath, swelling or bloating  3. Dizziness, lightheadedness   4. Any questions or concerns.     Follow the American Heart Association Diet and Lifestyle recommendations:  Limit saturated fat, trans fat, sodium, red meat, sweets and sugar-sweetened beverages. If you choose to eat red meat, compare labels and select the leanest cuts available.  Aim for at least 150 minutes of moderate physical activity or 75 minutes of vigorous physical activity - or an equal combination of both - each week.    During business hours: [phone], option # 1 \"To leave a message for your care team\"     After hours, weekends or holidays: On Call Cardiologist [phone]   option #4 and ask to speak to the on-call Cardiologist. Inform them you are a CORE/heart failure patient at the [place].    Rivka Chou RN BSN  Cardiology Nurse Coordinator - Heart Failure Clinic  [facility]  [phone] option 1 to schedule an appointment or leave a message for your care team    "

## 2024-01-02 NOTE — TELEPHONE ENCOUNTER
Left Voicemail (2nd Attempt) for the patient to call back and schedule the following:    Appointment type: AKSHAT  Provider: ZOË  Return date: NEXT AVAIL  Specialty phone number: [phone]  Additional appointment(s) needed: N/A  Additonal Notes: N/A

## 2024-01-02 NOTE — NURSING NOTE
Chief Complaint   Patient presents with    Follow Up     Return cardiology, 87 yo female with with CAD, essential hypertension, NSTEMI presents for annual follow up.         Vitals were taken, medications reconciled.    Armin Barnes, Facilitator   11:56 AM

## 2024-01-02 NOTE — PROGRESS NOTES
Chief complaint: CAD     HPI:   Tessa Mansfield is a 86 year old female former patient of Dr. Mir with extensive CAD history as detailed below (PCI to all 3 vessels at various times from 6647-2658, last PCA to RPDA 7/23/13 and known 80% ISR of small LCX) on indefinite dual antiplatelet therapy and sick sinus syndrome s/p dual-chamber PPM who presents for follow up of her chronic cardiovascular conditions.      12/10/19:  She underwent pacemaker generator change 10/15/19 due to her prior generator reaching LOLA (new device: Medtronic [id number] [place] XT DR MRI dual chamber PPM) without event.      TTE 10/15/19 (my read):   Normal LV/RV function, LVEF=60-65%.   At least moderate MR and moderate-to-severe TR.  RVSP~39+RAP~15 mmHg.  Compared to 7/24/13: MR and TR have worsened.      She reports volume status has been about stable and that she has self-titrated furosemide as needed and has done so for some time.      5/11/21:  Since her last visit, Tessa reports feeling generally well. She does have occasional episodes of angina (pain between her shoulders) for which she takes 1 NTG every 2-3 months, which has been longstanding and stable.      She denies any dyspnea at rest or with exertion, PND, orthopnea, peripheral edema, palpitations, lightheadedness or syncope.      5/17/2022:  Patient states that she has had SOB for the last month. States that she is able to walk 1-2 blocks before developing SOB, prior to this she was able to walk about 4 blocks before becoming SOB.  She denies chest pain, chest pressure, lightheadedness or dizziness.     12/06/22:  Since her last visit, she underwent coronary angiogram and PCI+ALVAREZ to RCA with dramatic improvement in her exertional dyspnea. She completed cardiac rehab. She does think she could walk somewhat further than before but has not pushed her exercise capacity recently. She plans to start this in in the new year. She has not required NTG at all since her last visit.     She  denies any dyspnea at rest or with exertion, PND, orthopnea, peripheral edema, palpitations, lightheadedness or syncope.     01/02/24:  Since her last visit, she had PCI to dRCA ISR 7/2023 and LAD 11/6/23. Her dyspnea is improved, though not completely resolved. She has found apixaban to be expensive and is wondering about alternatives.     Summary of CAD history:  10/27/2002: AMI s/p PCI+BMS (3.5x18mm Bx velocity) to mRCA  2/5/2003: Back pain; PCI+stent to mLCX c/b distal embolization/slow flow  4/11/2003: Unstable angina; PCI+stent (Hepacoat velocity) to mLAD  11/27/2007: PCI+DESx2 to pLAD (IVUS w/ calcification of LMCA and ulcerated plaque pLAD, 80% dRCA; also had 80% dLCX, too small to stent)  12/11/2007: Staged PCI. PCI+DESx1 (3.5x13mm Cypher) to dRCA (indefinite DAPT recommended at this time)  6/27/2008: Angina. mLCX stent 70% ISR. LAD and RCA stents patent. Myocardium at risk from LCX felt to be small, medical management preferred to PCI.  11/10/2009: Angina. Findings unchanged from 6/27/2008, FFR LAD 0.90. Medical management recommended.  7/23/2013: Unstable angina; PCI+ALVAREZ to mPDA. Diagnostic findings: LMCA 40% distal. LAD: pLAD stents patent mLAD 30% ISR dLAD 50% diffuse, D2 diffuse disease. LCX 80% mid ISR. RCA diffuse <30% mid, RPLAs diffuse disease, RPDA 100% occlusion.   5/27/2022: 90% dRCA in-stent -> PCI+DESx1 to dRCA.  50-60% pRCA (return for hemodynamic assessment if symptoms persist/recur) 50-60% small LCx (medical management recommended)  7/18/23: 80% ISR of dRCA --> PCI w/ALVAREZ x2, RPDA-2 lesion is 50% stenosed, RPDA-1 lesion is 65% stenosed, Ost LAD to Prox LAD lesion is 50% stenosed, Prox LAD to Mid LAD lesion is 40% stenosed, mLAD 70% stenosed, prox Cx to Mid Cx lesion is 55% stenosed small vessel.  11/6/23: LAD PCI             PAST MEDICAL HISTORY:  Past Medical History:   Diagnosis Date    CAD (coronary artery disease)     CAD s/p PCI+BMS to MRCA 10/2002, PCI to mLCX 2/2003, PCI+DESx2 to pLAD  "11/2007, PCI+DESx1 to dRCA 12/2007, PCI+ALVAREZ to RPDA 7/2013; on indefinite DAPT  10/27/2002    10/27/2002: AMI s/p PCI+BMS (3.5x18mm Bx velocity) to mRCA 2/5/2003: Back pain; PCI+stent to mLCX c/b distal embolization/slow flow 4/11/2003: Unstable angina; PCI+stent (Hepacoat velocity) to mLAD 11/27/2007: PCI+DESx2 to pLAD (IVUS w/ calcification of LMCA and ulcerated plaque pLAD, 80% dRCA; also had 80% dLCX, too small to stent) 12/11/2007: Staged PCI. PCI+DESx1 (3.5x13mm Cypher) to dRCA (indefinite DAPT recommended at this time) 6/27/2008: Angina. mLCX stent 70% ISR. LAD and RCA stents patent. Myocardium at risk from LCX felt to be small, medical management preferred to PCI. 11/10/2009: Angina. Findings unchanged from 6/27/2008, FFR LAD 0.90. Medical management recommended. 7/23/2013: Unstable angina; PCI+ALVAREZ to mPDA. Diagnostic findings: LMCA 40% distal. LAD: pLAD stents patent mLAD 30% ISR dLAD 50% diffuse, D2 diffuse disease. LCX 80% mid ISR. RCA diffuse <30% mid, RPLAs diffuse disease, RPDA 100% occlusion.      Cardiac Pacemaker- Medtronic, dual chamber- NOT dependant 11/28/2007    CKD (chronic kidney disease), stage IV (H)     Hyperlipidemia LDL goal <70     Hypertension     Stented coronary artery 10-    RCA    Stented coronary artery 2-5-2003    LCx    Stented coronary artery 4-    LAD    Stented coronary artery 11-    LAD    Stented coronary artery 12-    RCA    Transient complete heart block (H) 11/28/2007       CURRENT MEDICATIONS:  Reviewed.     ALLERGIES:     Allergies   Allergen Reactions    Ciprofloxacin Rash    Codeine Sulfate Itching    Shrimp Swelling    Bactrim [Sulfamethoxazole W/Trimethoprim]      Patient unable to recall    Biaxin [Clarithromycin]     Chlorthalidone Nausea and Vomiting    Clonidine     Darvon [Propoxyphene Hcl]     Dilaudid [Hydromorphone] Visual Disturbance and Hallucination    Gabapentin Fatigue and Confusion     \"felt drunk\"    Indomethacin     " "Levaquin [Levofloxacin Hemihydrate]     Morphine Sulfate     Percocet [Oxycodone-Acetaminophen] Hallucination    Pregabalin Itching and Fatigue    Simvastatin Palpitations     Muscle weakness, leg cramping      Spironolactone      Dehydrated      Terazosin        FAMILY HISTORY:  Family History   Problem Relation Age of Onset    Cancer Sister 82        bladder cancer       SOCIAL HISTORY:  Social History     Tobacco Use    Smoking status: Former     Packs/day: 0.30     Years: 15.00     Additional pack years: 0.00     Total pack years: 4.50     Types: Cigarettes    Smokeless tobacco: Never    Tobacco comments:     Quit 22+ years ago   Substance Use Topics    Drug use: No       ROS:   A comprehensive 14 point review of systems is negative other than as mentioned in HPI.    Exam:  /69 (BP Location: Right arm, Patient Position: Chair, Cuff Size: Adult Regular)   Pulse 69   Ht 1.665 m (5' 5.55\")   Wt 73.4 kg (161 lb 12.8 oz)   SpO2 99%   BMI 26.47 kg/m    GENERAL APPEARANCE: healthy, alert and no distress  EYES: no icterus, no xanthelasmas  ENT: normal palate, mucosa moist, no central cyanosis  NECK: JVP pre-V 7 (large CV wave)   RESPIRATORY: lungs clear to auscultation - no rales, rhonchi or wheezes, no use of accessory muscles, no retractions, respirations are unlabored, normal respiratory rate  CARDIOVASCULAR: regular rhythm, II/VI systolic murmur LLSB and apex  GI: soft, non tender, bowel sounds normal,no abdominal bruits  EXTREMITIES: no edema, no bruits  NEURO: alert and oriented to person/place/time, normal speech, gait and affect  VASC: Radial, dorsalis pedis and posterior tibialis pulses 2+ bilaterally.  SKIN: no ecchymoses, no rashes.  PSYCH: cooperative, affect appropriate.     Labs:  Reviewed.       Testing/Procedures:  TTE 10/15/19:   Normal LV/RV function, LVEF=60-65%.   At least moderate MR and moderate-to-severe TR.  RVSP~39+RAP~15 mmHg.  Compared to 7/24/13: MR and TR have worsened.    "   Device check 05/11/21: Normal device function. 86.3% AP <1% . Intrinsic rhythm sinus bradycardia at 48 bpm. No atrial or ventricular arrhythmias. Lead trends stable.    Device check 3/25/22: Normal device function. AP: 78% : <0.1% Presenting EGM: AP-VS @ 83 bpm. 2 seconds of AT/AF. <0.1% AF burden. No ventricular arrhythmias.     TTE 05/17/22: LVEF:60-65%, normal RV function, moderate MR, moderate TR, normal IVC    Coronary angiogram/RHC/PCI 5/27/22:  90% dRCA in-stent -> PCI+ALVAREZ   50-60% pRCA (return for hemodynamic assessment if symptoms persist/recur)  50-60% small LCx (medical management recommended    RA 13 RV 46/13 PA 46/16 (26) PCW 15  Jamila CO 4.76 CI 2.6  PVR by Jamila CO 2.3  Device interrogation 11/29/22: 71% AP. No AF. Normal device function.      Dr. Santoyo personally visualized:  TTE 10/9/23: Normal LV/RV function, LVEF=55-60%. Mild-mod MR, mild TR, normal IVC. No significant change from 5/2022.          Assessment and Plan:   1. CAD s/p multiple PCIs as above (last to RPDA 7/23/13 and now s/p PCI+DESx1 to dRCA 5/27/22) and known 50-60% pRCA ISR, now with significant improvement in stable angina:   Dyspnea dramatically improved post-PCI-- I believe this was an anginal equivalent (near-normal PCW on RHC at the time of coronary angiogram/PCI supports this.)   Had recurrent exertional dyspnea 7/2023, found to have ISR of mid to distal RCA, s/p PCI with 2 ALVAREZ. Post cath still experiencing SOB with exertion and upper back pain with exertion, resolves with rest. Underwent LAD PCI with significant improvement in breathing, still have back pain, unlikely cardiac.   -continue clopidogrel/DOAC indefinitely  -continue statin  -If she has recurrent symptoms, repeat coronary angiogram and FFR/iFR of 50-60% pRCA lesion would be worth pursuing    2. Paroxysmal atrial fibrillation, [id number]  -continue DOAC   -continue present beta blocker       3. Mild-Moderate mitral regurgitation, stable  4. Moderate  tricuspid regurgitation, stable/clinically improved  -no current symptoms, will continue to follow clinically and with periodic TTE       5. Renal Hypertension, controlled  6. CKD stage 4  -continue present therapy  -followed by Odilia Martin and Scar        7. Sick sinus syndrome s/p dual chamber PPM:  -routine device clinic follow up    Tyrell Santoyo MD  Cardiology

## 2024-01-03 RX ORDER — BUDESONIDE 0.5 MG/2ML
INHALANT ORAL
Qty: 120 ML | Refills: 0 | OUTPATIENT
Start: 2024-01-03

## 2024-01-04 DIAGNOSIS — I10 ESSENTIAL HYPERTENSION: ICD-10-CM

## 2024-01-04 LAB
MDC_IDC_LEAD_CONNECTION_STATUS: NORMAL
MDC_IDC_LEAD_CONNECTION_STATUS: NORMAL
MDC_IDC_LEAD_IMPLANT_DT: NORMAL
MDC_IDC_LEAD_IMPLANT_DT: NORMAL
MDC_IDC_LEAD_LOCATION: NORMAL
MDC_IDC_LEAD_LOCATION: NORMAL
MDC_IDC_LEAD_LOCATION_DETAIL_1: NORMAL
MDC_IDC_LEAD_LOCATION_DETAIL_1: NORMAL
MDC_IDC_LEAD_MFG: NORMAL
MDC_IDC_LEAD_MFG: NORMAL
MDC_IDC_LEAD_MODEL: NORMAL
MDC_IDC_LEAD_MODEL: NORMAL
MDC_IDC_LEAD_POLARITY_TYPE: NORMAL
MDC_IDC_LEAD_POLARITY_TYPE: NORMAL
MDC_IDC_LEAD_SERIAL: NORMAL
MDC_IDC_LEAD_SERIAL: NORMAL
MDC_IDC_LEAD_SPECIAL_FUNCTION: NORMAL
MDC_IDC_LEAD_SPECIAL_FUNCTION: NORMAL
MDC_IDC_MSMT_BATTERY_DTM: NORMAL
MDC_IDC_MSMT_BATTERY_REMAINING_LONGEVITY: 112 MO
MDC_IDC_MSMT_BATTERY_RRT_TRIGGER: 2.62
MDC_IDC_MSMT_BATTERY_STATUS: NORMAL
MDC_IDC_MSMT_BATTERY_VOLTAGE: 3 V
MDC_IDC_MSMT_LEADCHNL_RA_IMPEDANCE_VALUE: 304 OHM
MDC_IDC_MSMT_LEADCHNL_RA_IMPEDANCE_VALUE: 342 OHM
MDC_IDC_MSMT_LEADCHNL_RA_PACING_THRESHOLD_AMPLITUDE: 0.5 V
MDC_IDC_MSMT_LEADCHNL_RA_PACING_THRESHOLD_PULSEWIDTH: 0.4 MS
MDC_IDC_MSMT_LEADCHNL_RA_SENSING_INTR_AMPL: 2.5 MV
MDC_IDC_MSMT_LEADCHNL_RV_IMPEDANCE_VALUE: 399 OHM
MDC_IDC_MSMT_LEADCHNL_RV_IMPEDANCE_VALUE: 437 OHM
MDC_IDC_MSMT_LEADCHNL_RV_PACING_THRESHOLD_AMPLITUDE: 1 V
MDC_IDC_MSMT_LEADCHNL_RV_PACING_THRESHOLD_PULSEWIDTH: 0.4 MS
MDC_IDC_MSMT_LEADCHNL_RV_SENSING_INTR_AMPL: 8.62 MV
MDC_IDC_PG_IMPLANT_DTM: NORMAL
MDC_IDC_PG_MFG: NORMAL
MDC_IDC_PG_MODEL: NORMAL
MDC_IDC_PG_SERIAL: NORMAL
MDC_IDC_PG_TYPE: NORMAL
MDC_IDC_SESS_CLINIC_NAME: NORMAL
MDC_IDC_SESS_DTM: NORMAL
MDC_IDC_SESS_TYPE: NORMAL
MDC_IDC_SET_BRADY_AT_MODE_SWITCH_RATE: 171 {BEATS}/MIN
MDC_IDC_SET_BRADY_HYSTRATE: NORMAL
MDC_IDC_SET_BRADY_LOWRATE: 60 {BEATS}/MIN
MDC_IDC_SET_BRADY_MAX_SENSOR_RATE: 130 {BEATS}/MIN
MDC_IDC_SET_BRADY_MAX_TRACKING_RATE: 130 {BEATS}/MIN
MDC_IDC_SET_BRADY_MODE: NORMAL
MDC_IDC_SET_BRADY_PAV_DELAY_LOW: 180 MS
MDC_IDC_SET_BRADY_SAV_DELAY_LOW: 150 MS
MDC_IDC_SET_LEADCHNL_RA_PACING_AMPLITUDE: 1.5 V
MDC_IDC_SET_LEADCHNL_RA_PACING_ANODE_ELECTRODE_1: NORMAL
MDC_IDC_SET_LEADCHNL_RA_PACING_ANODE_LOCATION_1: NORMAL
MDC_IDC_SET_LEADCHNL_RA_PACING_CAPTURE_MODE: NORMAL
MDC_IDC_SET_LEADCHNL_RA_PACING_CATHODE_ELECTRODE_1: NORMAL
MDC_IDC_SET_LEADCHNL_RA_PACING_CATHODE_LOCATION_1: NORMAL
MDC_IDC_SET_LEADCHNL_RA_PACING_POLARITY: NORMAL
MDC_IDC_SET_LEADCHNL_RA_PACING_PULSEWIDTH: 0.4 MS
MDC_IDC_SET_LEADCHNL_RA_SENSING_ANODE_ELECTRODE_1: NORMAL
MDC_IDC_SET_LEADCHNL_RA_SENSING_ANODE_LOCATION_1: NORMAL
MDC_IDC_SET_LEADCHNL_RA_SENSING_CATHODE_ELECTRODE_1: NORMAL
MDC_IDC_SET_LEADCHNL_RA_SENSING_CATHODE_LOCATION_1: NORMAL
MDC_IDC_SET_LEADCHNL_RA_SENSING_POLARITY: NORMAL
MDC_IDC_SET_LEADCHNL_RA_SENSING_SENSITIVITY: 0.3 MV
MDC_IDC_SET_LEADCHNL_RV_PACING_AMPLITUDE: 2 V
MDC_IDC_SET_LEADCHNL_RV_PACING_ANODE_ELECTRODE_1: NORMAL
MDC_IDC_SET_LEADCHNL_RV_PACING_ANODE_LOCATION_1: NORMAL
MDC_IDC_SET_LEADCHNL_RV_PACING_CAPTURE_MODE: NORMAL
MDC_IDC_SET_LEADCHNL_RV_PACING_CATHODE_ELECTRODE_1: NORMAL
MDC_IDC_SET_LEADCHNL_RV_PACING_CATHODE_LOCATION_1: NORMAL
MDC_IDC_SET_LEADCHNL_RV_PACING_POLARITY: NORMAL
MDC_IDC_SET_LEADCHNL_RV_PACING_PULSEWIDTH: 0.4 MS
MDC_IDC_SET_LEADCHNL_RV_SENSING_ANODE_ELECTRODE_1: NORMAL
MDC_IDC_SET_LEADCHNL_RV_SENSING_ANODE_LOCATION_1: NORMAL
MDC_IDC_SET_LEADCHNL_RV_SENSING_CATHODE_ELECTRODE_1: NORMAL
MDC_IDC_SET_LEADCHNL_RV_SENSING_CATHODE_LOCATION_1: NORMAL
MDC_IDC_SET_LEADCHNL_RV_SENSING_POLARITY: NORMAL
MDC_IDC_SET_LEADCHNL_RV_SENSING_SENSITIVITY: 0.9 MV
MDC_IDC_SET_ZONE_DETECTION_INTERVAL: 350 MS
MDC_IDC_SET_ZONE_DETECTION_INTERVAL: 400 MS
MDC_IDC_SET_ZONE_STATUS: NORMAL
MDC_IDC_SET_ZONE_STATUS: NORMAL
MDC_IDC_SET_ZONE_TYPE: NORMAL
MDC_IDC_SET_ZONE_VENDOR_TYPE: NORMAL
MDC_IDC_STAT_AT_BURDEN_PERCENT: 0 %
MDC_IDC_STAT_AT_DTM_END: NORMAL
MDC_IDC_STAT_AT_DTM_START: NORMAL
MDC_IDC_STAT_BRADY_AP_VP_PERCENT: 0.02 %
MDC_IDC_STAT_BRADY_AP_VS_PERCENT: 73.03 %
MDC_IDC_STAT_BRADY_AS_VP_PERCENT: 0.03 %
MDC_IDC_STAT_BRADY_AS_VS_PERCENT: 26.92 %
MDC_IDC_STAT_BRADY_DTM_END: NORMAL
MDC_IDC_STAT_BRADY_DTM_START: NORMAL
MDC_IDC_STAT_BRADY_RA_PERCENT_PACED: 73.72 %
MDC_IDC_STAT_BRADY_RV_PERCENT_PACED: 0.05 %
MDC_IDC_STAT_EPISODE_RECENT_COUNT: 0
MDC_IDC_STAT_EPISODE_RECENT_COUNT_DTM_END: NORMAL
MDC_IDC_STAT_EPISODE_RECENT_COUNT_DTM_START: NORMAL
MDC_IDC_STAT_EPISODE_TOTAL_COUNT: 0
MDC_IDC_STAT_EPISODE_TOTAL_COUNT: 6
MDC_IDC_STAT_EPISODE_TOTAL_COUNT_DTM_END: NORMAL
MDC_IDC_STAT_EPISODE_TOTAL_COUNT_DTM_START: NORMAL
MDC_IDC_STAT_EPISODE_TYPE: NORMAL
MDC_IDC_STAT_TACHYTHERAPY_RECENT_DTM_END: NORMAL
MDC_IDC_STAT_TACHYTHERAPY_RECENT_DTM_START: NORMAL
MDC_IDC_STAT_TACHYTHERAPY_TOTAL_DTM_END: NORMAL
MDC_IDC_STAT_TACHYTHERAPY_TOTAL_DTM_START: NORMAL

## 2024-01-06 DIAGNOSIS — I10 ESSENTIAL HYPERTENSION: ICD-10-CM

## 2024-01-06 RX ORDER — METOPROLOL TARTRATE 100 MG
100 TABLET ORAL 2 TIMES DAILY
Qty: 180 TABLET | Refills: 3 | Status: ON HOLD | OUTPATIENT
Start: 2024-01-06 | End: 2024-03-21

## 2024-01-06 NOTE — TELEPHONE ENCOUNTER
metoprolol tartrate (LOPRESSOR) 100 MG tablet 180 tablet 0 10/11/2023  Last Office Visit : 11/15/23  Future Office visit:  0      BP Readings from Last 3 Encounters:   01/02/24 133/69   11/22/23 134/62   11/20/23 137/74

## 2024-01-08 RX ORDER — AMLODIPINE BESYLATE 10 MG/1
10 TABLET ORAL DAILY
Qty: 90 TABLET | Refills: 0 | Status: ON HOLD | OUTPATIENT
Start: 2024-01-08 | End: 2024-04-15

## 2024-02-07 ENCOUNTER — TELEPHONE (OUTPATIENT)
Dept: CARDIOLOGY | Facility: CLINIC | Age: 89
End: 2024-02-07
Payer: COMMERCIAL

## 2024-02-07 NOTE — TELEPHONE ENCOUNTER
[facility] Call Center    Phone Message    May a detailed message be left on voicemail: yes     Reason for Call: Other: Tessa is having facial surgery on 2/27 and her surgeon wants her to hold her blood thinners a few days prior.  Tessa would like a call back to discuss this further.     Action Taken: Other: Cardiology    Travel Screening: Not Applicable    Thank you!  [facility]

## 2024-02-08 NOTE — TELEPHONE ENCOUNTER
Reviewed with jayne Garcia to hold Eliquis for 3 days before surgery. I called Tessa to review this and she expressed understanding.    Rivka Chou RN

## 2024-02-15 ENCOUNTER — TELEPHONE (OUTPATIENT)
Dept: NEPHROLOGY | Facility: CLINIC | Age: 89
End: 2024-02-15
Payer: COMMERCIAL

## 2024-02-15 DIAGNOSIS — N18.4 CKD (CHRONIC KIDNEY DISEASE) STAGE 4, GFR 15-29 ML/MIN (H): Primary | ICD-10-CM

## 2024-02-15 NOTE — TELEPHONE ENCOUNTER
Spoke with patient rgarding a lab appointment prior to her upcoming visit with Dr Martin on 2/19. A lab appointment scheduled with patient for 2/16/24.  CAROL Graham Care Coordinator  Nephrology

## 2024-02-16 ENCOUNTER — LAB (OUTPATIENT)
Dept: LAB | Facility: CLINIC | Age: 89
End: 2024-02-16
Payer: COMMERCIAL

## 2024-02-16 ENCOUNTER — ANCILLARY PROCEDURE (OUTPATIENT)
Dept: CT IMAGING | Facility: CLINIC | Age: 89
End: 2024-02-16
Attending: INTERNAL MEDICINE
Payer: COMMERCIAL

## 2024-02-16 DIAGNOSIS — N18.4 CKD (CHRONIC KIDNEY DISEASE) STAGE 4, GFR 15-29 ML/MIN (H): ICD-10-CM

## 2024-02-16 DIAGNOSIS — R93.89 ABNORMAL CT OF THE CHEST: ICD-10-CM

## 2024-02-16 LAB
ALBUMIN SERPL BCG-MCNC: 4.4 G/DL (ref 3.5–5.2)
ANION GAP SERPL CALCULATED.3IONS-SCNC: 13 MMOL/L (ref 7–15)
BUN SERPL-MCNC: 37.8 MG/DL (ref 8–23)
CALCIUM SERPL-MCNC: 9.2 MG/DL (ref 8.8–10.2)
CHLORIDE SERPL-SCNC: 111 MMOL/L (ref 98–107)
CREAT SERPL-MCNC: 1.91 MG/DL (ref 0.51–0.95)
CREAT UR-MCNC: 11.6 MG/DL
DEPRECATED HCO3 PLAS-SCNC: 20 MMOL/L (ref 22–29)
EGFRCR SERPLBLD CKD-EPI 2021: 25 ML/MIN/1.73M2
FERRITIN SERPL-MCNC: 57 NG/ML (ref 11–328)
GLUCOSE SERPL-MCNC: 94 MG/DL (ref 70–99)
HGB BLD-MCNC: 9.4 G/DL (ref 11.7–15.7)
MICROALBUMIN UR-MCNC: 49.1 MG/L
MICROALBUMIN/CREAT UR: 423.28 MG/G CR (ref 0–25)
PHOSPHATE SERPL-MCNC: 3.3 MG/DL (ref 2.5–4.5)
POTASSIUM SERPL-SCNC: 4.5 MMOL/L (ref 3.4–5.3)
PTH-INTACT SERPL-MCNC: 91 PG/ML (ref 15–65)
SODIUM SERPL-SCNC: 144 MMOL/L (ref 135–145)
VIT D+METAB SERPL-MCNC: 60 NG/ML (ref 20–50)

## 2024-02-16 PROCEDURE — 83970 ASSAY OF PARATHORMONE: CPT | Performed by: PATHOLOGY

## 2024-02-16 PROCEDURE — 36415 COLL VENOUS BLD VENIPUNCTURE: CPT | Performed by: PATHOLOGY

## 2024-02-16 PROCEDURE — 82728 ASSAY OF FERRITIN: CPT | Performed by: PATHOLOGY

## 2024-02-16 PROCEDURE — 82570 ASSAY OF URINE CREATININE: CPT | Performed by: INTERNAL MEDICINE

## 2024-02-16 PROCEDURE — 85018 HEMOGLOBIN: CPT | Performed by: PATHOLOGY

## 2024-02-16 PROCEDURE — 71250 CT THORAX DX C-: CPT | Performed by: RADIOLOGY

## 2024-02-16 PROCEDURE — 99000 SPECIMEN HANDLING OFFICE-LAB: CPT | Performed by: PATHOLOGY

## 2024-02-16 PROCEDURE — 82306 VITAMIN D 25 HYDROXY: CPT | Performed by: INTERNAL MEDICINE

## 2024-02-16 PROCEDURE — 80069 RENAL FUNCTION PANEL: CPT | Performed by: PATHOLOGY

## 2024-02-19 ENCOUNTER — OFFICE VISIT (OUTPATIENT)
Dept: NEPHROLOGY | Facility: CLINIC | Age: 89
End: 2024-02-19
Payer: COMMERCIAL

## 2024-02-19 VITALS
SYSTOLIC BLOOD PRESSURE: 146 MMHG | WEIGHT: 155 LBS | BODY MASS INDEX: 25.36 KG/M2 | HEART RATE: 94 BPM | DIASTOLIC BLOOD PRESSURE: 75 MMHG

## 2024-02-19 DIAGNOSIS — N25.81 SECONDARY RENAL HYPERPARATHYROIDISM (H): ICD-10-CM

## 2024-02-19 DIAGNOSIS — I10 ESSENTIAL HYPERTENSION: ICD-10-CM

## 2024-02-19 DIAGNOSIS — E87.6 HYPOKALEMIA: ICD-10-CM

## 2024-02-19 DIAGNOSIS — N18.4 CKD (CHRONIC KIDNEY DISEASE) STAGE 4, GFR 15-29 ML/MIN (H): ICD-10-CM

## 2024-02-19 DIAGNOSIS — D69.6 THROMBOCYTOPENIA (H): ICD-10-CM

## 2024-02-19 DIAGNOSIS — I50.33 ACUTE ON CHRONIC DIASTOLIC (CONGESTIVE) HEART FAILURE (H): ICD-10-CM

## 2024-02-19 DIAGNOSIS — I25.118 CORONARY ARTERY DISEASE OF NATIVE ARTERY OF NATIVE HEART WITH STABLE ANGINA PECTORIS (H): ICD-10-CM

## 2024-02-19 DIAGNOSIS — N18.4 CHRONIC KIDNEY DISEASE, STAGE IV (SEVERE) (H): ICD-10-CM

## 2024-02-19 PROCEDURE — 99214 OFFICE O/P EST MOD 30 MIN: CPT | Performed by: INTERNAL MEDICINE

## 2024-02-19 RX ORDER — FUROSEMIDE 40 MG
40 TABLET ORAL 2 TIMES DAILY
Qty: 180 TABLET | Refills: 3 | Status: ON HOLD | OUTPATIENT
Start: 2024-02-19 | End: 2024-03-21

## 2024-02-19 RX ORDER — VITAMIN B COMPLEX
25 TABLET ORAL EVERY OTHER DAY
Qty: 90 TABLET | Refills: 3 | Status: SHIPPED | OUTPATIENT
Start: 2024-02-19

## 2024-02-19 RX ORDER — POTASSIUM CHLORIDE 750 MG/1
TABLET, EXTENDED RELEASE ORAL
Qty: 270 TABLET | Refills: 3 | Status: ON HOLD | OUTPATIENT
Start: 2024-02-19 | End: 2024-03-21

## 2024-02-19 NOTE — PATIENT INSTRUCTIONS
Monitor blood pressure a bit  Goal is 120-135/ 70-80 range    Can try 2 potassium pills if you want    Take metamucil for bulking stools

## 2024-02-19 NOTE — PROGRESS NOTES
Nephrology Progress Note     Assessment and Plan:   88 year old female with history of long standing HTN, CAD s/p multiple stents, who presents for followup of CKD stage 4, baseline SCr 1.8-2.2 mg/dL now with proteinuria. She had another angiogram with stent done June 2022 and November 2023 (she now has 10 stents)    1. CKD stage 4- baseline SCr 1.8-2.2mg/ dL, eGFR 25-29 ml/min. Mild/ minimal proteinuria now up to > 1gram, due to ? Uncontrolled BP, now better at 0.58g/g cr  Her swelling was previously occasional. She's had more swelling since taking amlodipine 10 mg daily and has been taking furosemide 40 mg BID. Now only taking 40mg once a day and swelling is OK , takes 40mg in PM if needed.  - creatinine stable at 1.9  - Her proteinuria - may be due to BP not ideally controlled. Consider SGLT2 inhibitor?  Unclear benefit at her age.  - BP 130s/70s at home. Was 128/68  - monitor labs, relatively stable at this time, except proteinuria  - consider ACE inhibitor , cannot see that she has been on this, given proteinuria and CKD-    - takes furosemide once a day only typically  - will check some blood pressure at home    2. Electrolytes/Acid Base status- normal.    Hypokalemia- takes 3 potassium 10 mEq pills daily- potassium has been 4.5 , can decrease to 20mEq daily and repeat labs in 4-6 months  - bicarbonate is lower at 20 - does have loose stools, will try metamucil to bulk up stools- monitor    3. Hypertension/Volume status-  She is a little high in the office, will have her check BP at home. She is on hydralazine 30 mg TID, isosorbide mononitrate 40 mg TID, metoprolol 100 mg BID, furosemide 40 mg BID-now decreased to 'one pill daily'  - followup on BP on 40mg daily  - can increase hydralazine if needed  - sees cardiology in April   -patient reports history of reaction/intolerance to Spironolactone, Chlorthalidone, Clonidine and Terazosin in the past   - takes prn furosemide in the afternoon, and BP is close to  goal.   - had new stent to RCA placed on 7/17/23 and another in November 2023  - ECHO 7/17/23  Left ventricular function is normal.The ejection fraction is 55-60%. There is  mild hypokinesis of the basal and mid inferior segments.  The right ventricle is normal size. Global right ventricular function is  normal.  No significant valvular abnormalities.  The estimated PA systolic pressure is 32 mmHg.  IVC diameter <2.1 cm collapsing >50% with sniff suggests a normal RA pressure  of 3 mmHg.    4. Anemia- hgb at 8.9, up from low 7 range during admission 8/11/2023 iron sat 14%  -encouraged iron supplement every other day, recheck iron labs in one month along with hgb  - low platelet count- 90s, improved from 60 range.  - check CBC regularly  5. BMD  - last serum calcium and phosphorus were normal  -secondary hyperparathyroidism-PTH was 91  - vit D is high normal at 60- repeat yearly.     Assessment and plan was discussed with patient and she voiced her understanding and agreement.    - return in 3,6months    Reason for Visit:  Tessa Mansfield is an 88 year old female with HTN, who presents for CKD management.     HPI:  She is a pleasant female with PMMHx significant for stage III CKD presumed secondary to hypertensive nephrosclerosis.Her renal function has been  relatively stable, ranging from 1.8-2.2 mg/dL over the years. She has history of CAD s/p multiple stents  (8 stents in all) since 2004 and a pacemaker in 2007. She follows with cardiologist Dr Santoyo and Dr Zhu.    She had elected stent placed in RCA on 7/17 to help with angina.     She was admitted from 7/19 to 7/23 for melena secondary to bleeding colonic angiodysplastic lesion, acute on chronic anemia. S/p repair.   She has been taking furosemide twice a day for a while    She feels fatigued but has not been sleeping and was quite anemic.  She has chronic diarrhea, no n/v.     She has had a lot of issues with getting to sleeping which got worse since  her  had a fall and shattered his right arm.  She has had more swelling in recent 2 weeks or so, she was to decrease to 40mg daily of furosemide, but went back to twice a day but did not realize her tablets are 20mg tablets.  She will double check at home.    Weight is 155-160 lbs and stable, but she is currently taking 40mg only of furosemide once a day  She had another angiogram in November 2023, she was having chest pain (back pain ) with exertion which resolved after stent.  Her son was ?diagnosed with Albina Gehrig but second opinion at [place] thinks it is more a spine / nerve impingement?      She is stressed about this but otherwise doing very well.  She will see Dr Santoyo in April     Home BP: not checking    Baseline Cr: 1.8-2.2    ROS:  A comprehensive review of systems was obtained and negative, except as noted in the HPI or PMH.    Active Medical Problems:  Patient Active Problem List   Diagnosis    Degeneration of cervical intervertebral disc    Nonallopathic lesion of cervical region    Nonallopathic lesion of thoracic region    Coronary artery disease of native artery of native heart with stable angina pectoris (H24)    Dizziness and giddiness    Edema    Esophageal reflux    Gout    Essential hypertension    Obstructive sleep apnea    Osteomalacia    Post-menopausal osteoporosis    Cardiac Pacemaker- Medtronic, dual chamber- NOT dependant    Transient complete heart block (H)    Sinus node dysfunction (H)    Disturbance of skin sensation    NSTEMI (non-ST elevated myocardial infarction) (H)    Arrhythmia    Sick sinus syndrome (H)    Non-rheumatic mitral regurgitation    Non-rheumatic tricuspid valve insufficiency    CKD (chronic kidney disease) stage 4, GFR 15-29 ml/min (H)    Status post coronary angiogram    Secondary renal hyperparathyroidism (H24)    Thrombocytopenia (H24)    Renal hypertension    Acute on chronic diastolic (congestive) heart failure (H)    Chest pain, unspecified type     Long term (current) use of anticoagulants    Melena    General weakness    S/P coronary artery stent placement    Antiplatelet or antithrombotic long-term use    Anemia, unspecified type    Aftercare following surgery of the musculoskeletal system    Closed right femoral fracture (H)    Hyperlipidemia    Hypokalemia    Orthostatic hypotension    Osteoarthritis of right patellofemoral joint    Postoperative anemia due to acute blood loss    Chronic anemia    ACP (advance care planning)    Chronic kidney disease, stage 3 (H)    Biomechanical lesion, unspecified    GERD (gastroesophageal reflux disease)       Personal Hx:   Social History     Socioeconomic History    Marital status:      Spouse name: Not on file    Number of children: 4    Years of education: Not on file    Highest education level: Not on file   Occupational History     Employer: [employer]   Tobacco Use    Smoking status: Former     Packs/day: 0.30     Years: 15.00     Additional pack years: 0.00     Total pack years: 4.50     Types: Cigarettes    Smokeless tobacco: Never    Tobacco comments:     Quit 22+ years ago   Substance and Sexual Activity    Alcohol use: Not on file    Drug use: No    Sexual activity: Not Currently     Partners: Male     Birth control/protection: Post-menopausal   Other Topics Concern     Service Not Asked    Blood Transfusions Yes    Caffeine Concern Not Asked    Occupational Exposure Not Asked    Hobby Hazards Not Asked    Sleep Concern Not Asked    Stress Concern Not Asked    Weight Concern Not Asked    Special Diet Not Asked    Back Care Not Asked    Exercise No    Bike Helmet Not Asked    Seat Belt Not Asked    Self-Exams Not Asked    Parent/sibling w/ CABG, MI or angioplasty before 65F 55M? Not Asked   Social History Narrative    Not on file     Social Determinants of Health     Financial Resource Strain: Low Risk  (11/15/2023)    Financial Resource Strain     Within the past 12 months, have  "you or your family members you live with been unable to get utilities (heat, electricity) when it was really needed?: No   Food Insecurity: Low Risk  (11/15/2023)    Food Insecurity     Within the past 12 months, did you worry that your food would run out before you got money to buy more?: No     Within the past 12 months, did the food you bought just not last and you didn t have money to get more?: No   Transportation Needs: Low Risk  (11/15/2023)    Transportation Needs     Within the past 12 months, has lack of transportation kept you from medical appointments, getting your medicines, non-medical meetings or appointments, work, or from getting things that you need?: No   Physical Activity: Not on file   Stress: Not on file   Social Connections: Not on file   Interpersonal Safety: Low Risk  (11/15/2023)    Interpersonal Safety     Do you feel physically and emotionally safe where you currently live?: Yes     Within the past 12 months, have you been hit, slapped, kicked or otherwise physically hurt by someone?: No     Within the past 12 months, have you been humiliated or emotionally abused in other ways by your partner or ex-partner?: No   Housing Stability: Low Risk  (11/15/2023)    Housing Stability     Do you have housing? : Yes     Are you worried about losing your housing?: No       Allergies:  Allergies   Allergen Reactions    Ciprofloxacin Rash    Codeine Sulfate Itching    Shrimp Swelling    Bactrim [Sulfamethoxazole W/Trimethoprim]      Patient unable to recall    Biaxin [Clarithromycin]     Chlorthalidone Nausea and Vomiting    Clonidine     Darvon [Propoxyphene Hcl]     Dilaudid [Hydromorphone] Visual Disturbance and Hallucination    Gabapentin Fatigue and Confusion     \"felt drunk\"    Indomethacin     Levaquin [Levofloxacin Hemihydrate]     Morphine Sulfate     Percocet [Oxycodone-Acetaminophen] Hallucination    Pregabalin Itching and Fatigue    Simvastatin Palpitations     Muscle weakness, leg " cramping      Spironolactone      Dehydrated      Terazosin        Current Outpatient Medications   Medication    allopurinol (ZYLOPRIM) 100 MG tablet    amLODIPine (NORVASC) 10 MG tablet    apixaban ANTICOAGULANT (ELIQUIS) 2.5 MG tablet    aspirin (ASA) 325 MG EC tablet    budesonide (PULMICORT) 0.5 MG/2ML neb solution    calcium carbonate 500 mg, elemental, (OSCAL 500) 1250 (500 Ca) MG TABS tablet    clopidogrel (PLAVIX) 75 MG tablet    cyanocolbalamin (VITAMIN B-12) 1000 MCG tablet    ferrous sulfate (FEROSUL) 325 (65 Fe) MG tablet    fluticasone-salmeterol (ADVAIR) 250-50 MCG/ACT inhaler    furosemide (LASIX) 40 MG tablet    hydrALAZINE (APRESOLINE) 10 MG tablet    isosorbide mononitrate (ISMO/MONOKET) 20 MG tablet    metoprolol tartrate (LOPRESSOR) 100 MG tablet    Multiple Vitamins-Minerals (PRESERVISION AREDS 2+MULTI VIT PO)    omeprazole (PRILOSEC) 40 MG DR capsule    potassium chloride ER (K-TAB/KLOR-CON) 10 MEQ CR tablet    rosuvastatin (CRESTOR) 20 MG tablet    timolol maleate (TIMOPTIC) 0.5 % ophthalmic solution    VITAMIN D3 25 MCG (1000 UT) tablet     No current facility-administered medications for this visit.       Vitals:  BP (!) 146/75   Pulse 94   Wt 70.3 kg (155 lb)   BMI 25.36 kg/m      Exam:  General: awake and alert, appears to be in no acute distress  Neuro: normal speech  Skin: warm and dry, no visible rash  Heart: RRR  Lungs: clear bilaterally  Extremities:no LE edema      Results:  Last Comprehensive Metabolic Panel:  Sodium   Date Value Ref Range Status   02/16/2024 144 135 - 145 mmol/L Final     Comment:     Reference intervals for this test were updated on 09/26/2023 to more accurately reflect our healthy population. There may be differences in the flagging of prior results with similar values performed with this method. Interpretation of those prior results can be made in the context of the updated reference intervals.    08/26/2020 141 133 - 144 mmol/L Final     Potassium   Date  Value Ref Range Status   02/16/2024 4.5 3.4 - 5.3 mmol/L Final   11/21/2022 4.8 3.4 - 5.3 mmol/L Final   08/26/2020 4.4 3.4 - 5.3 mmol/L Final     Chloride   Date Value Ref Range Status   02/16/2024 111 (H) 98 - 107 mmol/L Final   11/21/2022 110 (H) 94 - 109 mmol/L Final   08/26/2020 111 (H) 94 - 109 mmol/L Final     Carbon Dioxide   Date Value Ref Range Status   08/26/2020 24 20 - 32 mmol/L Final     Carbon Dioxide (CO2)   Date Value Ref Range Status   02/16/2024 20 (L) 22 - 29 mmol/L Final   11/21/2022 24 20 - 32 mmol/L Final     Anion Gap   Date Value Ref Range Status   02/16/2024 13 7 - 15 mmol/L Final   11/21/2022 6 3 - 14 mmol/L Final   08/26/2020 8 3 - 14 mmol/L Final     Glucose   Date Value Ref Range Status   02/16/2024 94 70 - 99 mg/dL Final   11/21/2022 79 70 - 99 mg/dL Final   08/26/2020 84 70 - 99 mg/dL Final     Urea Nitrogen   Date Value Ref Range Status   02/16/2024 37.8 (H) 8.0 - 23.0 mg/dL Final   11/21/2022 35 (H) 7 - 30 mg/dL Final   08/26/2020 41 (H) 7 - 30 mg/dL Final     Creatinine   Date Value Ref Range Status   02/16/2024 1.91 (H) 0.51 - 0.95 mg/dL Final   08/26/2020 1.70 (H) 0.52 - 1.04 mg/dL Final     GFR Estimate   Date Value Ref Range Status   02/16/2024 25 (L) >60 mL/min/1.73m2 Final   08/26/2020 27 (L) >60 mL/min/[1.73_m2] Final     Comment:     Non  GFR Calc  Starting 12/18/2018, serum creatinine based estimated GFR (eGFR) will be   calculated using the Chronic Kidney Disease Epidemiology Collaboration   (CKD-EPI) equation.       Calcium   Date Value Ref Range Status   02/16/2024 9.2 8.8 - 10.2 mg/dL Final   08/26/2020 9.0 8.5 - 10.1 mg/dL Final       Last Basic Metabolic Panel:  Lab Results   Component Value Date     11/08/2017      Lab Results   Component Value Date    POTASSIUM 3.5 11/08/2017     Lab Results   Component Value Date    CHLORIDE 104 11/08/2017     Lab Results   Component Value Date    ALEXANDRO 8.8 11/08/2017     Lab Results   Component Value Date     CO2 26 11/08/2017     Lab Results   Component Value Date    BUN 54 11/08/2017     Lab Results   Component Value Date    CR 2.36 11/08/2017     Lab Results   Component Value Date    GLC 88 11/08/2017       Laura Llanes Pa-c    Visit length 15 minutes. An additional 15 minutes were spent on date of service in chart review, documentation, and other acitivies as noted.    done

## 2024-02-20 ENCOUNTER — VIRTUAL VISIT (OUTPATIENT)
Dept: SURGERY | Facility: CLINIC | Age: 89
End: 2024-02-20
Attending: CLINICAL NURSE SPECIALIST
Payer: COMMERCIAL

## 2024-02-20 VITALS — HEIGHT: 67 IN | WEIGHT: 155 LBS | BODY MASS INDEX: 24.33 KG/M2

## 2024-02-20 DIAGNOSIS — R91.8 LUNG NODULES: Primary | ICD-10-CM

## 2024-02-20 PROCEDURE — 99441 PR PHYSICIAN TELEPHONE EVALUATION 5-10 MIN: CPT | Mod: 93 | Performed by: CLINICAL NURSE SPECIALIST

## 2024-02-20 ASSESSMENT — PAIN SCALES - GENERAL: PAINLEVEL: NO PAIN (0)

## 2024-02-20 NOTE — NURSING NOTE
Vitals taken yesterday per pt       Is the patient currently in the state of [place]? YES    Visit mode:TELEPHONE    If the visit is dropped, the patient can be reconnected by: TELEPHONE VISIT: Phone number:   Telephone Information:   Mobile [phone]       Will anyone else be joining the visit? NO  (If patient encounters technical issues they should call [phone])    How would you like to obtain your AVS? Mail a copy    Are changes needed to the allergy or medication list? No    Patient declined individual allergy and medication review by support staff because they deny any changes and state that all information remains accurate since last reviewed/verified.   No changes since reviewed yesterday per pt     Reason for visit: CA Covarrubias MA VVF

## 2024-02-20 NOTE — PROGRESS NOTES
Virtual Visit Details    Type of service:  Telephone Visit   Phone call duration: 5 minutes     THORACIC SURGERY FOLLOW UP VISIT      I had a telephone visit with Mrs. Tessa Mansfield in follow-up today. The clinical summary follows:     CHIEF COMPLAINT  Lung nodules  INTERVAL STUDIES  CT chest 02/16/2024:  1. Stable pulmonary nodules since 2/16/2023. Per Fleischner guidelines these are considered statistically benign.  2. Resolved mucous plugging in the right lower lobe as well as resolved groundglass opacity in the left lower lobe    Past Medical History:   Diagnosis Date    CAD (coronary artery disease)     CAD s/p PCI+BMS to MRCA 10/2002, PCI to mLCX 2/2003, PCI+DESx2 to pLAD 11/2007, PCI+DESx1 to dRCA 12/2007, PCI+ALVAREZ to RPDA 7/2013; on indefinite DAPT  10/27/2002    10/27/2002: AMI s/p PCI+BMS (3.5x18mm Bx velocity) to mRCA 2/5/2003: Back pain; PCI+stent to mLCX c/b distal embolization/slow flow 4/11/2003: Unstable angina; PCI+stent (Hepacoat velocity) to mLAD 11/27/2007: PCI+DESx2 to pLAD (IVUS w/ calcification of LMCA and ulcerated plaque pLAD, 80% dRCA; also had 80% dLCX, too small to stent) 12/11/2007: Staged PCI. PCI+DESx1 (3.5x13mm Cypher) to dRCA (indefinite DAPT recommended at this time) 6/27/2008: Angina. mLCX stent 70% ISR. LAD and RCA stents patent. Myocardium at risk from LCX felt to be small, medical management preferred to PCI. 11/10/2009: Angina. Findings unchanged from 6/27/2008, FFR LAD 0.90. Medical management recommended. 7/23/2013: Unstable angina; PCI+ALVAREZ to mPDA. Diagnostic findings: LMCA 40% distal. LAD: pLAD stents patent mLAD 30% ISR dLAD 50% diffuse, D2 diffuse disease. LCX 80% mid ISR. RCA diffuse <30% mid, RPLAs diffuse disease, RPDA 100% occlusion.      Cardiac Pacemaker- Medtronic, dual chamber- NOT dependant 11/28/2007    CKD (chronic kidney disease), stage IV (H)     Hyperlipidemia LDL goal <70     Hypertension     Stented coronary artery 10-    RCA    Stented coronary  artery 2-5-2003    LCx    Stented coronary artery 4-    LAD    Stented coronary artery 11-    LAD    Stented coronary artery 12-    RCA    Transient complete heart block (H) 11/28/2007      Past Surgical History:   Procedure Laterality Date    CHOLECYSTECTOMY      CV CORONARY ANGIOGRAM N/A 6/17/2022    Procedure: Coronary Angiogram;  Surgeon: Constantine Macias MD;  Location: [facility] CARDIAC CATH LAB    CV CORONARY ANGIOGRAM N/A 7/17/2023    Procedure: Coronary Angiogram;  Surgeon: Constantine Macias MD;  Location: [facility] CARDIAC CATH LAB    CV PCI N/A 6/17/2022    Procedure: Percutaneous Coronary Intervention;  Surgeon: Constantine Macias MD;  Location: [facility] CARDIAC CATH LAB    CV PCI N/A 7/17/2023    Procedure: Percutaneous Coronary Intervention;  Surgeon: Constantine Macias MD;  Location: [facility] CARDIAC CATH LAB    CV PCI N/A 11/6/2023    Procedure: Percutaneous Coronary Intervention;  Surgeon: Constantine Macias MD;  Location:  HEART CARDIAC CATH LAB    CV RIGHT HEART CATH MEASUREMENTS RECORDED N/A 6/17/2022    Procedure: Right Heart Catheterization;  Surgeon: Constantine Macias MD;  Location:  HEART CARDIAC CATH LAB    EP PACEMAKER N/A 10/15/2019    Procedure: EP PACEMAKER;  Surgeon: Anthony Zhu MD;  Location:  HEART CARDIAC CATH LAB    ESOPHAGOSCOPY, GASTROSCOPY, DUODENOSCOPY (EGD), COMBINED N/A 7/20/2023    Procedure: Esophagoscopy, gastroscopy, duodenoscopy (EGD), combined;  Surgeon: Bekah Dash DO;  Location:  GI    HC PPM INSERTION NEW/REPLACEMENT W/ ATRIAL&VENTRICULAR LEAD  11-    HYSTERECTOMY        Social History     Socioeconomic History    Marital status:      Spouse name: Not on file    Number of children: 4    Years of education: Not on file    Highest education level: Not on file   Occupational History     Employer: [employer]   Tobacco Use    Smoking status: Former     Packs/day: 0.30     Years:  15.00     Additional pack years: 0.00     Total pack years: 4.50     Types: Cigarettes    Smokeless tobacco: Never    Tobacco comments:     Quit 35+ years ago   Substance and Sexual Activity    Alcohol use: Not on file    Drug use: No    Sexual activity: Not Currently     Partners: Male     Birth control/protection: Post-menopausal   Other Topics Concern     Service Not Asked    Blood Transfusions Yes    Caffeine Concern Not Asked    Occupational Exposure Not Asked    Hobby Hazards Not Asked    Sleep Concern Not Asked    Stress Concern Not Asked    Weight Concern Not Asked    Special Diet Not Asked    Back Care Not Asked    Exercise No    Bike Helmet Not Asked    Seat Belt Not Asked    Self-Exams Not Asked    Parent/sibling w/ CABG, MI or angioplasty before 65F 55M? Not Asked   Social History Narrative    Not on file     Social Determinants of Health     Financial Resource Strain: Low Risk  (11/15/2023)    Financial Resource Strain     Within the past 12 months, have you or your family members you live with been unable to get utilities (heat, electricity) when it was really needed?: No   Food Insecurity: Low Risk  (11/15/2023)    Food Insecurity     Within the past 12 months, did you worry that your food would run out before you got money to buy more?: No     Within the past 12 months, did the food you bought just not last and you didn t have money to get more?: No   Transportation Needs: Low Risk  (11/15/2023)    Transportation Needs     Within the past 12 months, has lack of transportation kept you from medical appointments, getting your medicines, non-medical meetings or appointments, work, or from getting things that you need?: No   Physical Activity: Not on file   Stress: Not on file   Social Connections: Not on file   Interpersonal Safety: Low Risk  (11/15/2023)    Interpersonal Safety     Do you feel physically and emotionally safe where you currently live?: Yes     Within the past 12 months, have you  been hit, slapped, kicked or otherwise physically hurt by someone?: No     Within the past 12 months, have you been humiliated or emotionally abused in other ways by your partner or ex-partner?: No   Housing Stability: Low Risk  (11/15/2023)    Housing Stability     Do you have housing? : Yes     Are you worried about losing your housing?: No      SUBJECTIVE  Tessa is doing great. She denies recent illness, cough or new shortness of breath.    IMPRESSION   88 year-old female with stable lung nodules.    We reviewed her CT results and she is very pleased to hear that things look good. I explained that we do not need to continue lung nodule surveillance at this time.    PLAN  I spent 15 min on the date of the encounter in chart review, patient visit, review of tests, documentation and/or discussion with other providers about the issues documented above. I reviewed the plan as follows:  Follow up with me as needed. Continue Pulmonary follow up with Dr. Blair as scheduled.  All questions were answered and the patient and present family were in agreement with the plan.  I appreciate the opportunity to participate in the care of your patient and will keep you updated.  Sincerely,

## 2024-02-20 NOTE — LETTER
2/20/2024         RE: Tessa Mansifeld  [address]  [facility] [address]        Dear Colleague,    Thank you for referring your patient, Tessa Mansfield, to the [facility]. Please see a copy of my visit note below.    Virtual Visit Details    Type of service:  Telephone Visit   Phone call duration: 5 minutes     THORACIC SURGERY FOLLOW UP VISIT      I had a telephone visit with Mrs. Tessa Mansfield in follow-up today. The clinical summary follows:     CHIEF COMPLAINT  Lung nodules  INTERVAL STUDIES  CT chest 02/16/2024:  1. Stable pulmonary nodules since 2/16/2023. Per Fleischner guidelines these are considered statistically benign.  2. Resolved mucous plugging in the right lower lobe as well as resolved groundglass opacity in the left lower lobe    Past Medical History:   Diagnosis Date    CAD (coronary artery disease)     CAD s/p PCI+BMS to MRCA 10/2002, PCI to mLCX 2/2003, PCI+DESx2 to pLAD 11/2007, PCI+DESx1 to dRCA 12/2007, PCI+ALVAREZ to RPDA 7/2013; on indefinite DAPT  10/27/2002    10/27/2002: AMI s/p PCI+BMS (3.5x18mm Bx velocity) to mRCA 2/5/2003: Back pain; PCI+stent to mLCX c/b distal embolization/slow flow 4/11/2003: Unstable angina; PCI+stent (Hepacoat velocity) to mLAD 11/27/2007: PCI+DESx2 to pLAD (IVUS w/ calcification of LMCA and ulcerated plaque pLAD, 80% dRCA; also had 80% dLCX, too small to stent) 12/11/2007: Staged PCI. PCI+DESx1 (3.5x13mm Cypher) to dRCA (indefinite DAPT recommended at this time) 6/27/2008: Angina. mLCX stent 70% ISR. LAD and RCA stents patent. Myocardium at risk from LCX felt to be small, medical management preferred to PCI. 11/10/2009: Angina. Findings unchanged from 6/27/2008, FFR LAD 0.90. Medical management recommended. 7/23/2013: Unstable angina; PCI+ALVAREZ to mPDA. Diagnostic findings: LMCA 40% distal. LAD: pLAD stents patent mLAD 30% ISR dLAD 50% diffuse, D2 diffuse disease. LCX 80% mid ISR. RCA diffuse <30% mid, RPLAs diffuse  disease, RPDA 100% occlusion.      Cardiac Pacemaker- Medtronic, dual chamber- NOT dependant 11/28/2007    CKD (chronic kidney disease), stage IV (H)     Hyperlipidemia LDL goal <70     Hypertension     Stented coronary artery 10-    RCA    Stented coronary artery 2-5-2003    LCx    Stented coronary artery 4-    LAD    Stented coronary artery 11-    LAD    Stented coronary artery 12-    RCA    Transient complete heart block (H) 11/28/2007      Past Surgical History:   Procedure Laterality Date    CHOLECYSTECTOMY      CV CORONARY ANGIOGRAM N/A 6/17/2022    Procedure: Coronary Angiogram;  Surgeon: Constantine Macias MD;  Location: [facility] CARDIAC CATH LAB    CV CORONARY ANGIOGRAM N/A 7/17/2023    Procedure: Coronary Angiogram;  Surgeon: Constantine Macias MD;  Location: [facility] CARDIAC CATH LAB    CV PCI N/A 6/17/2022    Procedure: Percutaneous Coronary Intervention;  Surgeon: Constantine Macias MD;  Location: [facility] CARDIAC CATH LAB    CV PCI N/A 7/17/2023    Procedure: Percutaneous Coronary Intervention;  Surgeon: Constantine Macias MD;  Location: [facility] CARDIAC CATH LAB    CV PCI N/A 11/6/2023    Procedure: Percutaneous Coronary Intervention;  Surgeon: Constantine Macias MD;  Location: [facility] CARDIAC CATH LAB    CV RIGHT HEART CATH MEASUREMENTS RECORDED N/A 6/17/2022    Procedure: Right Heart Catheterization;  Surgeon: Constantine Macias MD;  Location: [facility] CARDIAC CATH LAB    EP PACEMAKER N/A 10/15/2019    Procedure: EP PACEMAKER;  Surgeon: Anthony Zhu MD;  Location: [facility] CARDIAC CATH LAB    ESOPHAGOSCOPY, GASTROSCOPY, DUODENOSCOPY (EGD), COMBINED N/A 7/20/2023    Procedure: Esophagoscopy, gastroscopy, duodenoscopy (EGD), combined;  Surgeon: Bekah Dash DO;  Location:  GI    HC PPM INSERTION NEW/REPLACEMENT W/ ATRIAL&VENTRICULAR LEAD  11-    HYSTERECTOMY        Social History     Socioeconomic History    Marital status:       Spouse name: Not on file    Number of children: 4    Years of education: Not on file    Highest education level: Not on file   Occupational History     Employer: [employer]   Tobacco Use    Smoking status: Former     Packs/day: 0.30     Years: 15.00     Additional pack years: 0.00     Total pack years: 4.50     Types: Cigarettes    Smokeless tobacco: Never    Tobacco comments:     Quit 35+ years ago   Substance and Sexual Activity    Alcohol use: Not on file    Drug use: No    Sexual activity: Not Currently     Partners: Male     Birth control/protection: Post-menopausal   Other Topics Concern     Service Not Asked    Blood Transfusions Yes    Caffeine Concern Not Asked    Occupational Exposure Not Asked    Hobby Hazards Not Asked    Sleep Concern Not Asked    Stress Concern Not Asked    Weight Concern Not Asked    Special Diet Not Asked    Back Care Not Asked    Exercise No    Bike Helmet Not Asked    Seat Belt Not Asked    Self-Exams Not Asked    Parent/sibling w/ CABG, MI or angioplasty before 65F 55M? Not Asked   Social History Narrative    Not on file     Social Determinants of Health     Financial Resource Strain: Low Risk  (11/15/2023)    Financial Resource Strain     Within the past 12 months, have you or your family members you live with been unable to get utilities (heat, electricity) when it was really needed?: No   Food Insecurity: Low Risk  (11/15/2023)    Food Insecurity     Within the past 12 months, did you worry that your food would run out before you got money to buy more?: No     Within the past 12 months, did the food you bought just not last and you didn t have money to get more?: No   Transportation Needs: Low Risk  (11/15/2023)    Transportation Needs     Within the past 12 months, has lack of transportation kept you from medical appointments, getting your medicines, non-medical meetings or appointments, work, or from getting things that you need?: No    Physical Activity: Not on file   Stress: Not on file   Social Connections: Not on file   Interpersonal Safety: Low Risk  (11/15/2023)    Interpersonal Safety     Do you feel physically and emotionally safe where you currently live?: Yes     Within the past 12 months, have you been hit, slapped, kicked or otherwise physically hurt by someone?: No     Within the past 12 months, have you been humiliated or emotionally abused in other ways by your partner or ex-partner?: No   Housing Stability: Low Risk  (11/15/2023)    Housing Stability     Do you have housing? : Yes     Are you worried about losing your housing?: No      SUBJECTIVE  Tessa is doing great. She denies recent illness, cough or new shortness of breath.    IMPRESSION   88 year-old female with stable lung nodules.    We reviewed her CT results and she is very pleased to hear that things look good. I explained that we do not need to continue lung nodule surveillance at this time.    PLAN  I spent 15 min on the date of the encounter in chart review, patient visit, review of tests, documentation and/or discussion with other providers about the issues documented above. I reviewed the plan as follows:  Follow up with me as needed. Continue Pulmonary follow up with Dr. Blair as scheduled.  All questions were answered and the patient and present family were in agreement with the plan.  I appreciate the opportunity to participate in the care of your patient and will keep you updated.  Sincerely,      JI Cheng CNS

## 2024-03-05 ENCOUNTER — PATIENT OUTREACH (OUTPATIENT)
Dept: NEPHROLOGY | Facility: CLINIC | Age: 89
End: 2024-03-05
Payer: COMMERCIAL

## 2024-03-05 NOTE — PROGRESS NOTES
Spoke with patient to get an update on current BP readings. Patient stated she had not been taking them but will start and call this nurse back on Monday, 3/11/24.  CAROL Graham Care Coordinator  Nephrology

## 2024-03-12 NOTE — PROGRESS NOTES
Patient called to give an update on home BP's.   3/7 126/63  3/8 127/66  3/9 133/75  3/10 123/63  3/11 135/63  3/12 123/57  Routing to Dr Martin for review.  Gladys, RN Care Coordinator  Nephrology

## 2024-03-13 NOTE — TELEPHONE ENCOUNTER
"Left vm to relay message from Dr Martin:  \"Very good   No change if she feels well \"  Patient informed to call [phone] if she has any questions or concerns.  CAROL Graham Care Coordinator  Nephrology    "

## 2024-03-17 ENCOUNTER — HOSPITAL ENCOUNTER (INPATIENT)
Facility: CLINIC | Age: 89
LOS: 4 days | Discharge: HOME | DRG: 872 | End: 2024-03-21
Attending: EMERGENCY MEDICINE | Admitting: INTERNAL MEDICINE
Payer: COMMERCIAL

## 2024-03-17 ENCOUNTER — APPOINTMENT (OUTPATIENT)
Dept: GENERAL RADIOLOGY | Facility: CLINIC | Age: 89
DRG: 872 | End: 2024-03-17
Attending: EMERGENCY MEDICINE
Payer: COMMERCIAL

## 2024-03-17 ENCOUNTER — APPOINTMENT (OUTPATIENT)
Dept: ULTRASOUND IMAGING | Facility: CLINIC | Age: 89
DRG: 872 | End: 2024-03-17
Payer: COMMERCIAL

## 2024-03-17 ENCOUNTER — APPOINTMENT (OUTPATIENT)
Dept: CT IMAGING | Facility: CLINIC | Age: 89
DRG: 872 | End: 2024-03-17
Attending: EMERGENCY MEDICINE
Payer: COMMERCIAL

## 2024-03-17 ENCOUNTER — APPOINTMENT (OUTPATIENT)
Dept: PHYSICAL THERAPY | Facility: CLINIC | Age: 89
DRG: 872 | End: 2024-03-17
Payer: COMMERCIAL

## 2024-03-17 ENCOUNTER — ANCILLARY PROCEDURE (OUTPATIENT)
Dept: CARDIOLOGY | Facility: CLINIC | Age: 89
DRG: 872 | End: 2024-03-17
Attending: INTERNAL MEDICINE
Payer: COMMERCIAL

## 2024-03-17 DIAGNOSIS — R53.1 WEAKNESS: ICD-10-CM

## 2024-03-17 DIAGNOSIS — K52.9 CHRONIC DIARRHEA: ICD-10-CM

## 2024-03-17 DIAGNOSIS — N30.01 ACUTE CYSTITIS WITH HEMATURIA: ICD-10-CM

## 2024-03-17 DIAGNOSIS — Z45.02 FITTING AND ADJUSTMENT OF AUTOMATIC IMPLANTABLE CARDIOVERTER-DEFIBRILLATOR: ICD-10-CM

## 2024-03-17 DIAGNOSIS — R55 SYNCOPE AND COLLAPSE: Primary | ICD-10-CM

## 2024-03-17 DIAGNOSIS — Z45.02 FITTING AND ADJUSTMENT OF AUTOMATIC IMPLANTABLE CARDIOVERTER-DEFIBRILLATOR: Primary | ICD-10-CM

## 2024-03-17 DIAGNOSIS — D72.829 LEUKOCYTOSIS, UNSPECIFIED TYPE: ICD-10-CM

## 2024-03-17 DIAGNOSIS — I48.20 CHRONIC ATRIAL FIBRILLATION (H): ICD-10-CM

## 2024-03-17 LAB
ACINETOBACTER SPECIES: NOT DETECTED
ALBUMIN SERPL BCG-MCNC: 3.7 G/DL (ref 3.5–5.2)
ALBUMIN UR-MCNC: 70 MG/DL
ALP SERPL-CCNC: 122 U/L (ref 40–150)
ALT SERPL W P-5'-P-CCNC: 18 U/L (ref 0–50)
ANION GAP SERPL CALCULATED.3IONS-SCNC: 16 MMOL/L (ref 7–15)
APPEARANCE UR: CLEAR
AST SERPL W P-5'-P-CCNC: 55 U/L (ref 0–45)
ATRIAL RATE - MUSE: 71 BPM
ATRIAL RATE - MUSE: 95 BPM
B-OH-BUTYR SERPL-SCNC: 0.7 MMOL/L
BASE EXCESS BLDV CALC-SCNC: -5.4 MMOL/L (ref -3–3)
BASOPHILS # BLD AUTO: ABNORMAL 10*3/UL
BASOPHILS # BLD MANUAL: 0 10E3/UL (ref 0–0.2)
BASOPHILS NFR BLD AUTO: ABNORMAL %
BASOPHILS NFR BLD MANUAL: 0 %
BILIRUB SERPL-MCNC: 0.4 MG/DL
BILIRUB UR QL STRIP: NEGATIVE
BUN SERPL-MCNC: 46.5 MG/DL (ref 8–23)
CALCIUM SERPL-MCNC: 8.7 MG/DL (ref 8.8–10.2)
CHLORIDE SERPL-SCNC: 106 MMOL/L (ref 98–107)
CITROBACTER SPECIES: NOT DETECTED
CK SERPL-CCNC: 90 U/L (ref 26–192)
COLOR UR AUTO: ABNORMAL
CREAT SERPL-MCNC: 2.69 MG/DL (ref 0.51–0.95)
CRP SERPL-MCNC: 41 MG/L
CTX-M: NOT DETECTED
DEPRECATED HCO3 PLAS-SCNC: 18 MMOL/L (ref 22–29)
DIASTOLIC BLOOD PRESSURE - MUSE: NORMAL MMHG
DIASTOLIC BLOOD PRESSURE - MUSE: NORMAL MMHG
EGFRCR SERPLBLD CKD-EPI 2021: 17 ML/MIN/1.73M2
ENTEROBACTER SPECIES: NOT DETECTED
EOSINOPHIL # BLD AUTO: ABNORMAL 10*3/UL
EOSINOPHIL # BLD MANUAL: 0 10E3/UL (ref 0–0.7)
EOSINOPHIL NFR BLD AUTO: ABNORMAL %
EOSINOPHIL NFR BLD MANUAL: 0 %
ERYTHROCYTE [DISTWIDTH] IN BLOOD BY AUTOMATED COUNT: 15.9 % (ref 10–15)
ESCHERICHIA COLI: DETECTED
FLUAV RNA SPEC QL NAA+PROBE: NEGATIVE
FLUBV RNA RESP QL NAA+PROBE: NEGATIVE
FOLATE SERPL-MCNC: >40 NG/ML (ref 4.6–34.8)
GLUCOSE SERPL-MCNC: 98 MG/DL (ref 70–99)
GLUCOSE UR STRIP-MCNC: NEGATIVE MG/DL
HCO3 BLDV-SCNC: 19 MMOL/L (ref 21–28)
HCT VFR BLD AUTO: 29.3 % (ref 35–47)
HGB BLD-MCNC: 9 G/DL (ref 11.7–15.7)
HGB UR QL STRIP: ABNORMAL
IMM GRANULOCYTES # BLD: ABNORMAL 10*3/UL
IMM GRANULOCYTES NFR BLD: ABNORMAL %
IMP: NOT DETECTED
INTERPRETATION ECG - MUSE: NORMAL
INTERPRETATION ECG - MUSE: NORMAL
KETONES UR STRIP-MCNC: NEGATIVE MG/DL
KLEBSIELLA OXYTOCA: NOT DETECTED
KLEBSIELLA PNEUMONIAE: NOT DETECTED
KPC: NOT DETECTED
LACTATE SERPL-SCNC: 1.3 MMOL/L (ref 0.7–2)
LACTATE SERPL-SCNC: 1.4 MMOL/L (ref 0.7–2)
LEUKOCYTE ESTERASE UR QL STRIP: ABNORMAL
LYMPHOCYTES # BLD AUTO: ABNORMAL 10*3/UL
LYMPHOCYTES # BLD MANUAL: 0.9 10E3/UL (ref 0.8–5.3)
LYMPHOCYTES NFR BLD AUTO: ABNORMAL %
LYMPHOCYTES NFR BLD MANUAL: 4 %
MAGNESIUM SERPL-MCNC: 2 MG/DL (ref 1.7–2.3)
MCH RBC QN AUTO: 28.8 PG (ref 26.5–33)
MCHC RBC AUTO-ENTMCNC: 30.7 G/DL (ref 31.5–36.5)
MCV RBC AUTO: 94 FL (ref 78–100)
MONOCYTES # BLD AUTO: ABNORMAL 10*3/UL
MONOCYTES # BLD MANUAL: 3.1 10E3/UL (ref 0–1.3)
MONOCYTES NFR BLD AUTO: ABNORMAL %
MONOCYTES NFR BLD MANUAL: 14 %
NDM: NOT DETECTED
NEUTROPHILS # BLD AUTO: ABNORMAL 10*3/UL
NEUTROPHILS # BLD MANUAL: 18 10E3/UL (ref 1.6–8.3)
NEUTROPHILS NFR BLD AUTO: ABNORMAL %
NEUTROPHILS NFR BLD MANUAL: 82 %
NITRATE UR QL: POSITIVE
NRBC # BLD AUTO: 0.2 10E3/UL
NRBC # BLD AUTO: 0.7 10E3/UL
NRBC BLD AUTO-RTO: 1 /100
NRBC BLD MANUAL-RTO: 3 %
NT-PROBNP SERPL-MCNC: 2497 PG/ML (ref 0–1800)
O2/TOTAL GAS SETTING VFR VENT: 24 %
OXA (DETECTED/NOT DETECTED): NOT DETECTED
OXYHGB MFR BLDV: 81 % (ref 70–75)
P AXIS - MUSE: 76 DEGREES
P AXIS - MUSE: 78 DEGREES
PCO2 BLDV: 32 MM HG (ref 40–50)
PH BLDV: 7.38 [PH] (ref 7.32–7.43)
PH UR STRIP: 5.5 [PH] (ref 5–7)
PLAT MORPH BLD: ABNORMAL
PLATELET # BLD AUTO: 63 10E3/UL (ref 150–450)
PO2 BLDV: 47 MM HG (ref 25–47)
POTASSIUM SERPL-SCNC: 3.2 MMOL/L (ref 3.4–5.3)
PR INTERVAL - MUSE: 186 MS
PR INTERVAL - MUSE: 204 MS
PROCALCITONIN SERPL IA-MCNC: 13.8 NG/ML
PROT SERPL-MCNC: 6.7 G/DL (ref 6.4–8.3)
PROTEUS SPECIES: NOT DETECTED
PSEUDOMONAS AERUGINOSA: NOT DETECTED
QRS DURATION - MUSE: 86 MS
QRS DURATION - MUSE: 90 MS
QT - MUSE: 382 MS
QT - MUSE: 420 MS
QTC - MUSE: 480 MS
QTC - MUSE: 487 MS
R AXIS - MUSE: 20 DEGREES
R AXIS - MUSE: 46 DEGREES
RBC # BLD AUTO: 3.12 10E6/UL (ref 3.8–5.2)
RBC MORPH BLD: ABNORMAL
RBC URINE: 19 /HPF
RSV RNA SPEC NAA+PROBE: NEGATIVE
SAO2 % BLDV: 82.2 % (ref 70–75)
SARS-COV-2 RNA RESP QL NAA+PROBE: NEGATIVE
SODIUM SERPL-SCNC: 140 MMOL/L (ref 135–145)
SP GR UR STRIP: 1.01 (ref 1–1.03)
SQUAMOUS EPITHELIAL: <1 /HPF
SYSTOLIC BLOOD PRESSURE - MUSE: NORMAL MMHG
SYSTOLIC BLOOD PRESSURE - MUSE: NORMAL MMHG
T AXIS - MUSE: 53 DEGREES
T AXIS - MUSE: 68 DEGREES
TROPONIN T SERPL HS-MCNC: 34 NG/L
TROPONIN T SERPL HS-MCNC: 34 NG/L
UROBILINOGEN UR STRIP-MCNC: NORMAL MG/DL
VENTRICULAR RATE- MUSE: 81 BPM
VENTRICULAR RATE- MUSE: 95 BPM
VIM: NOT DETECTED
VIT B12 SERPL-MCNC: >4000 PG/ML (ref 232–1245)
WBC # BLD AUTO: 22 10E3/UL (ref 4–11)
WBC URINE: 15 /HPF

## 2024-03-17 PROCEDURE — 94640 AIRWAY INHALATION TREATMENT: CPT | Mod: 76

## 2024-03-17 PROCEDURE — 999N000156 HC STATISTIC RCP CONSULT EA 30 MIN

## 2024-03-17 PROCEDURE — 87149 DNA/RNA DIRECT PROBE: CPT | Performed by: EMERGENCY MEDICINE

## 2024-03-17 PROCEDURE — 36415 COLL VENOUS BLD VENIPUNCTURE: CPT

## 2024-03-17 PROCEDURE — 87086 URINE CULTURE/COLONY COUNT: CPT | Performed by: EMERGENCY MEDICINE

## 2024-03-17 PROCEDURE — 83880 ASSAY OF NATRIURETIC PEPTIDE: CPT | Performed by: EMERGENCY MEDICINE

## 2024-03-17 PROCEDURE — 97530 THERAPEUTIC ACTIVITIES: CPT | Mod: GP

## 2024-03-17 PROCEDURE — 87186 SC STD MICRODIL/AGAR DIL: CPT | Performed by: EMERGENCY MEDICINE

## 2024-03-17 PROCEDURE — 97116 GAIT TRAINING THERAPY: CPT | Mod: GP

## 2024-03-17 PROCEDURE — 97161 PT EVAL LOW COMPLEX 20 MIN: CPT | Mod: GP

## 2024-03-17 PROCEDURE — 36415 COLL VENOUS BLD VENIPUNCTURE: CPT | Performed by: EMERGENCY MEDICINE

## 2024-03-17 PROCEDURE — 120N000002 HC R&B MED SURG/OB UMMC

## 2024-03-17 PROCEDURE — 250N000011 HC RX IP 250 OP 636: Performed by: EMERGENCY MEDICINE

## 2024-03-17 PROCEDURE — 83735 ASSAY OF MAGNESIUM: CPT

## 2024-03-17 PROCEDURE — 86140 C-REACTIVE PROTEIN: CPT

## 2024-03-17 PROCEDURE — 99285 EMERGENCY DEPT VISIT HI MDM: CPT | Mod: 25 | Performed by: EMERGENCY MEDICINE

## 2024-03-17 PROCEDURE — 96360 HYDRATION IV INFUSION INIT: CPT | Performed by: EMERGENCY MEDICINE

## 2024-03-17 PROCEDURE — 99223 1ST HOSP IP/OBS HIGH 75: CPT | Mod: AI | Performed by: INTERNAL MEDICINE

## 2024-03-17 PROCEDURE — 999N000111 HC STATISTIC OT IP EVAL DEFER

## 2024-03-17 PROCEDURE — 93010 ELECTROCARDIOGRAM REPORT: CPT | Performed by: EMERGENCY MEDICINE

## 2024-03-17 PROCEDURE — 81001 URINALYSIS AUTO W/SCOPE: CPT | Performed by: EMERGENCY MEDICINE

## 2024-03-17 PROCEDURE — 36415 COLL VENOUS BLD VENIPUNCTURE: CPT | Performed by: HOSPITALIST

## 2024-03-17 PROCEDURE — 76770 US EXAM ABDO BACK WALL COMP: CPT | Mod: 26 | Performed by: STUDENT IN AN ORGANIZED HEALTH CARE EDUCATION/TRAINING PROGRAM

## 2024-03-17 PROCEDURE — 70450 CT HEAD/BRAIN W/O DYE: CPT | Mod: 26 | Performed by: RADIOLOGY

## 2024-03-17 PROCEDURE — 82550 ASSAY OF CK (CPK): CPT

## 2024-03-17 PROCEDURE — 250N000013 HC RX MED GY IP 250 OP 250 PS 637: Performed by: EMERGENCY MEDICINE

## 2024-03-17 PROCEDURE — 250N000013 HC RX MED GY IP 250 OP 250 PS 637

## 2024-03-17 PROCEDURE — 83605 ASSAY OF LACTIC ACID: CPT | Performed by: EMERGENCY MEDICINE

## 2024-03-17 PROCEDURE — 71046 X-RAY EXAM CHEST 2 VIEWS: CPT | Mod: 26 | Performed by: RADIOLOGY

## 2024-03-17 PROCEDURE — 999N000157 HC STATISTIC RCP TIME EA 10 MIN

## 2024-03-17 PROCEDURE — 250N000009 HC RX 250

## 2024-03-17 PROCEDURE — 83605 ASSAY OF LACTIC ACID: CPT | Performed by: HOSPITALIST

## 2024-03-17 PROCEDURE — 93294 REM INTERROG EVL PM/LDLS PM: CPT | Performed by: INTERNAL MEDICINE

## 2024-03-17 PROCEDURE — 84145 PROCALCITONIN (PCT): CPT

## 2024-03-17 PROCEDURE — 87637 SARSCOV2&INF A&B&RSV AMP PRB: CPT | Performed by: EMERGENCY MEDICINE

## 2024-03-17 PROCEDURE — 93296 REM INTERROG EVL PM/IDS: CPT

## 2024-03-17 PROCEDURE — 82805 BLOOD GASES W/O2 SATURATION: CPT

## 2024-03-17 PROCEDURE — 82607 VITAMIN B-12: CPT

## 2024-03-17 PROCEDURE — 93005 ELECTROCARDIOGRAM TRACING: CPT | Performed by: EMERGENCY MEDICINE

## 2024-03-17 PROCEDURE — 82746 ASSAY OF FOLIC ACID SERUM: CPT

## 2024-03-17 PROCEDURE — 82010 KETONE BODYS QUAN: CPT

## 2024-03-17 PROCEDURE — 85014 HEMATOCRIT: CPT | Performed by: EMERGENCY MEDICINE

## 2024-03-17 PROCEDURE — 76770 US EXAM ABDO BACK WALL COMP: CPT

## 2024-03-17 PROCEDURE — 84484 ASSAY OF TROPONIN QUANT: CPT | Performed by: EMERGENCY MEDICINE

## 2024-03-17 PROCEDURE — 80053 COMPREHEN METABOLIC PANEL: CPT | Performed by: EMERGENCY MEDICINE

## 2024-03-17 PROCEDURE — 70450 CT HEAD/BRAIN W/O DYE: CPT

## 2024-03-17 PROCEDURE — 250N000011 HC RX IP 250 OP 636

## 2024-03-17 PROCEDURE — 258N000003 HC RX IP 258 OP 636: Performed by: EMERGENCY MEDICINE

## 2024-03-17 PROCEDURE — 71046 X-RAY EXAM CHEST 2 VIEWS: CPT

## 2024-03-17 PROCEDURE — 85007 BL SMEAR W/DIFF WBC COUNT: CPT | Performed by: EMERGENCY MEDICINE

## 2024-03-17 RX ORDER — ACETAMINOPHEN 650 MG/1
650 SUPPOSITORY RECTAL EVERY 4 HOURS PRN
Status: DISCONTINUED | OUTPATIENT
Start: 2024-03-17 | End: 2024-03-21 | Stop reason: HOSPADM

## 2024-03-17 RX ORDER — FERROUS SULFATE 325(65) MG
325 TABLET ORAL EVERY OTHER DAY
Status: DISCONTINUED | OUTPATIENT
Start: 2024-03-17 | End: 2024-03-21 | Stop reason: HOSPADM

## 2024-03-17 RX ORDER — CALCIUM CARBONATE 500 MG/1
1000 TABLET, CHEWABLE ORAL 4 TIMES DAILY PRN
Status: DISCONTINUED | OUTPATIENT
Start: 2024-03-17 | End: 2024-03-19

## 2024-03-17 RX ORDER — ASPIRIN 325 MG
325 TABLET, DELAYED RELEASE (ENTERIC COATED) ORAL DAILY
Status: CANCELLED | OUTPATIENT
Start: 2024-03-17

## 2024-03-17 RX ORDER — AMOXICILLIN 250 MG
2 CAPSULE ORAL 2 TIMES DAILY PRN
Status: DISCONTINUED | OUTPATIENT
Start: 2024-03-17 | End: 2024-03-21 | Stop reason: HOSPADM

## 2024-03-17 RX ORDER — POTASSIUM CHLORIDE 750 MG/1
10 TABLET, EXTENDED RELEASE ORAL 2 TIMES DAILY
Status: DISCONTINUED | OUTPATIENT
Start: 2024-03-17 | End: 2024-03-21 | Stop reason: HOSPADM

## 2024-03-17 RX ORDER — CEFTRIAXONE 1 G/1
1 INJECTION, POWDER, FOR SOLUTION INTRAMUSCULAR; INTRAVENOUS EVERY 24 HOURS
Status: DISCONTINUED | OUTPATIENT
Start: 2024-03-18 | End: 2024-03-17

## 2024-03-17 RX ORDER — METOPROLOL TARTRATE 50 MG
100 TABLET ORAL 2 TIMES DAILY
Status: DISCONTINUED | OUTPATIENT
Start: 2024-03-17 | End: 2024-03-19

## 2024-03-17 RX ORDER — ROSUVASTATIN CALCIUM 20 MG/1
20 TABLET, COATED ORAL DAILY
Status: DISCONTINUED | OUTPATIENT
Start: 2024-03-17 | End: 2024-03-19

## 2024-03-17 RX ORDER — CEFTRIAXONE 2 G/1
2 INJECTION, POWDER, FOR SOLUTION INTRAMUSCULAR; INTRAVENOUS EVERY 24 HOURS
Status: DISCONTINUED | OUTPATIENT
Start: 2024-03-18 | End: 2024-03-19

## 2024-03-17 RX ORDER — TIMOLOL MALEATE 5 MG/ML
1 SOLUTION/ DROPS OPHTHALMIC DAILY
Status: DISCONTINUED | OUTPATIENT
Start: 2024-03-17 | End: 2024-03-21 | Stop reason: HOSPADM

## 2024-03-17 RX ORDER — ACETAMINOPHEN 500 MG
1000 TABLET ORAL ONCE
Status: COMPLETED | OUTPATIENT
Start: 2024-03-17 | End: 2024-03-17

## 2024-03-17 RX ORDER — CEFTRIAXONE 1 G/1
1 INJECTION, POWDER, FOR SOLUTION INTRAMUSCULAR; INTRAVENOUS ONCE
Status: COMPLETED | OUTPATIENT
Start: 2024-03-17 | End: 2024-03-17

## 2024-03-17 RX ORDER — UREA 10 %
500 LOTION (ML) TOPICAL DAILY
Status: DISCONTINUED | OUTPATIENT
Start: 2024-03-17 | End: 2024-03-21 | Stop reason: HOSPADM

## 2024-03-17 RX ORDER — FUROSEMIDE 40 MG
40 TABLET ORAL 2 TIMES DAILY
Status: DISCONTINUED | OUTPATIENT
Start: 2024-03-17 | End: 2024-03-21 | Stop reason: HOSPADM

## 2024-03-17 RX ORDER — POTASSIUM CHLORIDE 750 MG/1
40 TABLET, EXTENDED RELEASE ORAL ONCE
Status: COMPLETED | OUTPATIENT
Start: 2024-03-17 | End: 2024-03-17

## 2024-03-17 RX ORDER — PANTOPRAZOLE SODIUM 40 MG/1
40 TABLET, DELAYED RELEASE ORAL 2 TIMES DAILY
Status: DISCONTINUED | OUTPATIENT
Start: 2024-03-17 | End: 2024-03-21 | Stop reason: HOSPADM

## 2024-03-17 RX ORDER — POLYETHYLENE GLYCOL 3350 17 G/17G
17 POWDER, FOR SOLUTION ORAL DAILY PRN
Status: DISCONTINUED | OUTPATIENT
Start: 2024-03-17 | End: 2024-03-21 | Stop reason: HOSPADM

## 2024-03-17 RX ORDER — CLOPIDOGREL BISULFATE 75 MG/1
75 TABLET ORAL DAILY
Status: DISCONTINUED | OUTPATIENT
Start: 2024-03-17 | End: 2024-03-21 | Stop reason: HOSPADM

## 2024-03-17 RX ORDER — VITAMIN B COMPLEX
25 TABLET ORAL EVERY OTHER DAY
Status: DISCONTINUED | OUTPATIENT
Start: 2024-03-17 | End: 2024-03-21 | Stop reason: HOSPADM

## 2024-03-17 RX ORDER — LIDOCAINE 40 MG/G
CREAM TOPICAL
Status: DISCONTINUED | OUTPATIENT
Start: 2024-03-17 | End: 2024-03-21 | Stop reason: HOSPADM

## 2024-03-17 RX ORDER — FLUTICASONE FUROATE AND VILANTEROL 100; 25 UG/1; UG/1
1 POWDER RESPIRATORY (INHALATION) DAILY
Status: DISCONTINUED | OUTPATIENT
Start: 2024-03-17 | End: 2024-03-21 | Stop reason: HOSPADM

## 2024-03-17 RX ORDER — BUDESONIDE 0.5 MG/2ML
0.5 INHALANT ORAL 2 TIMES DAILY
Status: DISCONTINUED | OUTPATIENT
Start: 2024-03-17 | End: 2024-03-17

## 2024-03-17 RX ORDER — AMOXICILLIN 250 MG
1 CAPSULE ORAL 2 TIMES DAILY PRN
Status: DISCONTINUED | OUTPATIENT
Start: 2024-03-17 | End: 2024-03-21 | Stop reason: HOSPADM

## 2024-03-17 RX ORDER — ISOSORBIDE MONONITRATE 20 MG/1
40 TABLET ORAL 3 TIMES DAILY
Status: DISCONTINUED | OUTPATIENT
Start: 2024-03-17 | End: 2024-03-21 | Stop reason: HOSPADM

## 2024-03-17 RX ORDER — CALCIUM CARBONATE 500(1250)
500 TABLET ORAL DAILY
Status: DISCONTINUED | OUTPATIENT
Start: 2024-03-17 | End: 2024-03-21 | Stop reason: HOSPADM

## 2024-03-17 RX ORDER — ALLOPURINOL 100 MG/1
100 TABLET ORAL DAILY
Status: DISCONTINUED | OUTPATIENT
Start: 2024-03-17 | End: 2024-03-19

## 2024-03-17 RX ORDER — ACETAMINOPHEN 325 MG/1
650 TABLET ORAL EVERY 4 HOURS PRN
Status: DISCONTINUED | OUTPATIENT
Start: 2024-03-17 | End: 2024-03-21 | Stop reason: HOSPADM

## 2024-03-17 RX ADMIN — APIXABAN 2.5 MG: 2.5 TABLET, FILM COATED ORAL at 20:32

## 2024-03-17 RX ADMIN — CLOPIDOGREL BISULFATE 75 MG: 75 TABLET ORAL at 08:51

## 2024-03-17 RX ADMIN — ACETAMINOPHEN 1000 MG: 500 TABLET ORAL at 05:19

## 2024-03-17 RX ADMIN — BUDESONIDE 0.5 MG: 0.5 INHALANT RESPIRATORY (INHALATION) at 08:39

## 2024-03-17 RX ADMIN — CYANOCOBALAMIN TAB 500 MCG 500 MCG: 500 TAB at 08:51

## 2024-03-17 RX ADMIN — SODIUM CHLORIDE 500 ML: 9 INJECTION, SOLUTION INTRAVENOUS at 02:17

## 2024-03-17 RX ADMIN — POTASSIUM CHLORIDE 40 MEQ: 750 TABLET, EXTENDED RELEASE ORAL at 08:50

## 2024-03-17 RX ADMIN — PANTOPRAZOLE SODIUM 40 MG: 40 TABLET, DELAYED RELEASE ORAL at 20:32

## 2024-03-17 RX ADMIN — Medication 25 MCG: at 08:51

## 2024-03-17 RX ADMIN — TIMOLOL MALEATE 1 DROP: 5 SOLUTION/ DROPS OPHTHALMIC at 08:51

## 2024-03-17 RX ADMIN — APIXABAN 2.5 MG: 2.5 TABLET, FILM COATED ORAL at 08:51

## 2024-03-17 RX ADMIN — ROSUVASTATIN CALCIUM 20 MG: 20 TABLET, FILM COATED ORAL at 08:51

## 2024-03-17 RX ADMIN — POTASSIUM CHLORIDE 10 MEQ: 750 TABLET, EXTENDED RELEASE ORAL at 20:31

## 2024-03-17 RX ADMIN — PANTOPRAZOLE SODIUM 40 MG: 40 TABLET, DELAYED RELEASE ORAL at 08:51

## 2024-03-17 RX ADMIN — CALCIUM 500 MG: 500 TABLET ORAL at 08:51

## 2024-03-17 RX ADMIN — CEFTRIAXONE SODIUM 1 G: 1 INJECTION, POWDER, FOR SOLUTION INTRAMUSCULAR; INTRAVENOUS at 05:18

## 2024-03-17 RX ADMIN — CEFTRIAXONE SODIUM 1 G: 1 INJECTION, POWDER, FOR SOLUTION INTRAMUSCULAR; INTRAVENOUS at 16:51

## 2024-03-17 RX ADMIN — POTASSIUM CHLORIDE 10 MEQ: 750 TABLET, EXTENDED RELEASE ORAL at 08:51

## 2024-03-17 ASSESSMENT — ACTIVITIES OF DAILY LIVING (ADL)
ADLS_ACUITY_SCORE: 36
ADLS_ACUITY_SCORE: 42
ADLS_ACUITY_SCORE: 42
ADLS_ACUITY_SCORE: 36
ADLS_ACUITY_SCORE: 42
ADLS_ACUITY_SCORE: 49
ADLS_ACUITY_SCORE: 42
ADLS_ACUITY_SCORE: 42
ADLS_ACUITY_SCORE: 36
ADLS_ACUITY_SCORE: 42
ADLS_ACUITY_SCORE: 36
ADLS_ACUITY_SCORE: 42
ADLS_ACUITY_SCORE: 36
ADLS_ACUITY_SCORE: 42

## 2024-03-17 ASSESSMENT — COLUMBIA-SUICIDE SEVERITY RATING SCALE - C-SSRS
6. HAVE YOU EVER DONE ANYTHING, STARTED TO DO ANYTHING, OR PREPARED TO DO ANYTHING TO END YOUR LIFE?: NO
1. IN THE PAST MONTH, HAVE YOU WISHED YOU WERE DEAD OR WISHED YOU COULD GO TO SLEEP AND NOT WAKE UP?: NO
2. HAVE YOU ACTUALLY HAD ANY THOUGHTS OF KILLING YOURSELF IN THE PAST MONTH?: NO

## 2024-03-17 NOTE — H&P
[facility]    History and Physical - Medicine Service, MAROON TEAM        Date of Admission:  3/17/2024    Assessment & Plan      Tessa Mansfield is a 89 year old female with PMHx  CAD s/p multiple stents, cardiac pacemaker (Medtronic, dual-chamber), CKD stage 4 admitted on 3/17/2024 for UTI.    # Syncope with fall  Low blood pressure after fall per family - possibly c/w orthostasis. Has significant cardiac history but denies preceeding CP or SOB; device interrogation without ventricular arrhythmia. Trops plateu at 34.  Last TTE was 6 months ago - can consider repeating if no improvement with IVF.  - S/p 500cc NS  - Repeat ECG  - Continue tele  - Consider TTE if no improvement with IVF  - CT head    # Cystitis  Dysuria with urinary urgency and hesitancy. Suprapubic tenderness on exam. Leukocytosis and UA with nitrites, LE and WBC suggestive of UTI in context of clinical symptoms.  - Add on procal, CRP  - PVR to rule out urinary retention  - Blood cultures pending, urine culture pending  - Continue ceftriaxone    # Diarrhea, ? Chronic  Has happened for months per patient - less likely infectious if acute. Not on medications that may   - Consider enteric panel if present    #  SHAJI with CKD stage IV  Baseline with 1.8-2.2, now 2.69 c/w SHAJI. Possible pre-renal in setting of infection, poor PO, and possible orthostatic pressures at home. If no improvement will need to assess for post-renal etiology.  - S/p 500cc NS  - Repeat BMP  - PVR  - If no improvement in creatinine as above will need urine studies, renal ultrasound  - Hold on additional fluids with HF history pending above    # Suspected anion gap metabolic acidosis  Bicarb low with high anion gap. Lactic acid negative. Possible from SHAJI but will also check for ketosis  - VBG, Ketone add on    # Hypokalemia  Is on supplementation as an outpatient, recently reduced to 20mEq daily.   - Add on Mag  - PO potassium 40mEq  now  - Consider home PO resumption tomorrow    # Weakness  Possibly in setting of infection. Will treat as above and consult PT and OT.  - CK  - Treatment of infection as above  - PT/OT conuslt    --- Chronic ---  # HFpEF  # CAD s/p PCI to multiple vessels  Follows closely with cardiology  - PTA clopidagrel    # HTN  HTN on arrival, BP 1000/50s during interview. In neph note has history of reaction/intolerance to Spironolactone, Chlorthalidone, Clonidine and Terazosin in the past. Patient reports she is taking amlodipine but this in not in her neph note - will not resume this  - HOLD PTA hydralazine 30 mg TID with softer BP  - HOLD PTA isosorbide mononitrate 40 mg TID with softer BP  - HOLD PTA metoprolol 100 mg BID with softer BP  - HOLD PTA furosemide 40 mg daily (recently changed from BID) with softer bp    # Sick sinus syndrome s/p dual chamber PPM  # Atrial fibrillation  - HOLD BP as above  - PTA apixaban    # Anemia of chronic disease and blood loss  Admitted 7/19 to 7/23 for melena secondary to bleeding colonic angiodysplastic lesion, acute on chronic anemia. S/p repair.   - HOLD PTA iron supp every other day with active infection    # Myofascial pain syndrome  - Follows with PMR          Diet:  Cardiac diet  DVT Prophylaxis: Pneumatic Compression Devices  Bonner Catheter: Not present  Fluids: As above  Lines: None     Cardiac Monitoring: None  Code Status:  Full, confirmed at bedside    Clinically Significant Risk Factors Present on Admission        # Hypokalemia: Lowest K = 3.2 mmol/L in last 2 days, will replace as needed        # Drug Induced Coagulation Defect: home medication list includes an anticoagulant medication  # Drug Induced Platelet Defect: home medication list includes an antiplatelet medication  # Acute Kidney Injury, unspecified: based on a >150% or 0.3 mg/dL increase in last creatinine compared to past 90 day average, will monitor renal function  # Hypertension: Noted on problem list  #  Chronic heart failure with preserved ejection fraction: heart failure noted on problem list and last echo with EF >50%          # Pacemaker present       Disposition Plan      Expected Discharge Date: 03/19/2024                To be formally staffed in the AM.      Maria Fernanda Singh MD  Medicine Service, [facility]  Securely message with Web[employer] (more info)  Text page via [facility] Paging/Directory   See signed in provider for up to date coverage information  ______________________________________________________________________    Chief Complaint   Feeling sick    History is obtained from the patient and EMR    History of Present Illness   Tessa Mansfield is a 89 year old female with PMHx HTN, CAD s/p multiple stents, cardiac pacemaker (Medtronic, dual-chamber), CKD stage 4, hyperlipidemia, GERD, anemia, orthostatic hypotension, hypokalemia, CHF, and NSTEMI who presents with weakness.    Patient was feeling sick . She tried to get out of bed and fell to the floor. She cannot remember exactly what happened but felt weak when she stood up. Denies hitting her head - no headache currently. Has chronic neck pain.. Had an episode of incontinence. The night before she had terrible upper back pain. Denies preceding dizziness, chest pain or SOB. Had nausea yesterday without yesterday. Denies other recent falls or episodes of passing out.    Endorses new dysuria that started a few weeks ago. Intermittent abdominal pain over the past few weeks, points to suprapubic area.. Has urinary frequency and episodes of incontinence starting a months ago. Has had chronic diarrhea over the past few months but reports she is taking milk of mag when she has a stomach ache. Occasionally related to what she is eating. Denies blood in stool, black or red. Denies fevers and chills -- occasional night sweats but she cannot think of when they started.    ROS: Otherwise no new joint pain  or swelling and  rashes/ wounds.     ED Course:  - Vitals: WNL on arrival  - Labs: WBC 22.0, Hgb 9.0, plt 63, K 3.2, Co2 18, AG 16, Cr 2.69, AST 55, Trop 34, NT-proBNP 2497, UA nitriates, WBC, RBC   - ECG not released, device interrogation Medtronic ventricular arrhythmia 0, atrial arrhythmia 9 seconds  - Imaging: CXR: no acute pulmonary findings  - Management: 500cc NS, IV ceftriaxone 1g      Past Medical History    Past Medical History:   Diagnosis Date    CAD (coronary artery disease)     CAD s/p PCI+BMS to MRCA 10/2002, PCI to mLCX 2/2003, PCI+DESx2 to pLAD 11/2007, PCI+DESx1 to dRCA 12/2007, PCI+ALVAREZ to RPDA 7/2013; on indefinite DAPT  10/27/2002    10/27/2002: AMI s/p PCI+BMS (3.5x18mm Bx velocity) to mRCA 2/5/2003: Back pain; PCI+stent to mLCX c/b distal embolization/slow flow 4/11/2003: Unstable angina; PCI+stent (Hepacoat velocity) to mLAD 11/27/2007: PCI+DESx2 to pLAD (IVUS w/ calcification of LMCA and ulcerated plaque pLAD, 80% dRCA; also had 80% dLCX, too small to stent) 12/11/2007: Staged PCI. PCI+DESx1 (3.5x13mm Cypher) to dRCA (indefinite DAPT recommended at this time) 6/27/2008: Angina. mLCX stent 70% ISR. LAD and RCA stents patent. Myocardium at risk from LCX felt to be small, medical management preferred to PCI. 11/10/2009: Angina. Findings unchanged from 6/27/2008, FFR LAD 0.90. Medical management recommended. 7/23/2013: Unstable angina; PCI+ALVAREZ to mPDA. Diagnostic findings: LMCA 40% distal. LAD: pLAD stents patent mLAD 30% ISR dLAD 50% diffuse, D2 diffuse disease. LCX 80% mid ISR. RCA diffuse <30% mid, RPLAs diffuse disease, RPDA 100% occlusion.      Cardiac Pacemaker- Medtronic, dual chamber- NOT dependant 11/28/2007    CKD (chronic kidney disease), stage IV (H)     Hyperlipidemia LDL goal <70     Hypertension     Stented coronary artery 10-    RCA    Stented coronary artery 2-5-2003    LCx    Stented coronary artery 4-    LAD    Stented coronary artery 11-    LAD    Stented  coronary artery 12-    RCA    Transient complete heart block (H) 11/28/2007       Past Surgical History   Past Surgical History:   Procedure Laterality Date    CHOLECYSTECTOMY      CV CORONARY ANGIOGRAM N/A 6/17/2022    Procedure: Coronary Angiogram;  Surgeon: Constantine Macias MD;  Location: [facility] CARDIAC CATH LAB    CV CORONARY ANGIOGRAM N/A 7/17/2023    Procedure: Coronary Angiogram;  Surgeon: Constantine Macias MD;  Location:  HEART CARDIAC CATH LAB    CV PCI N/A 6/17/2022    Procedure: Percutaneous Coronary Intervention;  Surgeon: Constantine Macias MD;  Location: [facility] CARDIAC CATH LAB    CV PCI N/A 7/17/2023    Procedure: Percutaneous Coronary Intervention;  Surgeon: Constantine Macias MD;  Location: [facility] CARDIAC CATH LAB    CV PCI N/A 11/6/2023    Procedure: Percutaneous Coronary Intervention;  Surgeon: Constantine Macias MD;  Location: [facility] CARDIAC CATH LAB    CV RIGHT HEART CATH MEASUREMENTS RECORDED N/A 6/17/2022    Procedure: Right Heart Catheterization;  Surgeon: Constantine Macias MD;  Location: [facility] CARDIAC CATH LAB    EP PACEMAKER N/A 10/15/2019    Procedure: EP PACEMAKER;  Surgeon: Anthony Zhu MD;  Location: [facility] CARDIAC CATH LAB    ESOPHAGOSCOPY, GASTROSCOPY, DUODENOSCOPY (EGD), COMBINED N/A 7/20/2023    Procedure: Esophagoscopy, gastroscopy, duodenoscopy (EGD), combined;  Surgeon: Bekah Dash DO;  Location:  GI    HC PPM INSERTION NEW/REPLACEMENT W/ ATRIAL&VENTRICULAR LEAD  11-    HYSTERECTOMY         Prior to Admission Medications   Prior to Admission Medications   Prescriptions Last Dose Informant Patient Reported? Taking?   Multiple Vitamins-Minerals (PRESERVISION AREDS 2+MULTI VIT PO)  Self Yes No   Sig: Take by mouth 2 times daily   allopurinol (ZYLOPRIM) 100 MG tablet   No No   Sig: Take 1 tablet by mouth once daily   amLODIPine (NORVASC) 10 MG tablet   No No   Sig: Take 1 tablet (10 mg) by mouth daily   apixaban  ANTICOAGULANT (ELIQUIS) 2.5 MG tablet   No No   Sig: Take 1 tablet (2.5 mg) by mouth 2 times daily   aspirin (ASA) 325 MG EC tablet   Yes No   Sig: Take 325 mg by mouth daily   budesonide (PULMICORT) 0.5 MG/2ML neb solution  Self No No   Sig: Take 2 mLs (0.5 mg) by nebulization 2 times daily   calcium carbonate 500 mg, elemental, (OSCAL 500) 1250 (500 Ca) MG TABS tablet   Yes No   Sig: Take by mouth daily   clopidogrel (PLAVIX) 75 MG tablet   No No   Sig: Take 1 tablet (75 mg) by mouth daily   cyanocolbalamin (VITAMIN B-12) 1000 MCG tablet  Self Yes No   Sig: Take 500 mcg by mouth daily As directed   ferrous sulfate (FEROSUL) 325 (65 Fe) MG tablet   No No   Sig: Take 1 tablet (325 mg) by mouth every other day   fluticasone-salmeterol (ADVAIR) 250-50 MCG/ACT inhaler  Self No No   Sig: INHALE 1 DOSE BY MOUTH TWICE DAILY   furosemide (LASIX) 40 MG tablet   No No   Sig: Take 1 tablet (40 mg) by mouth 2 times daily   hydrALAZINE (APRESOLINE) 10 MG tablet   No No   Sig: Take 3 tablets (30 mg) by mouth 3 times daily   isosorbide mononitrate (ISMO/MONOKET) 20 MG tablet   No No   Sig: Take 2 tablets (40 mg) by mouth 3 times daily   metoprolol tartrate (LOPRESSOR) 100 MG tablet   No No   Sig: Take 1 tablet by mouth twice daily   omeprazole (PRILOSEC) 40 MG DR capsule   No No   Sig: Take 1 capsule (40 mg) by mouth daily   potassium chloride ER (K-TAB/KLOR-CON) 10 MEQ CR tablet   No No   Sig: TAKE 1 TABLET BY MOUTH IN THE MORNING AND 1 IN THE EVENING   rosuvastatin (CRESTOR) 20 MG tablet   No No   Sig: Take 1 tablet (20 mg) by mouth daily for 360 days   timolol maleate (TIMOPTIC) 0.5 % ophthalmic solution  Self Yes No   Sig: INSTILL 1 DROP INTO EACH EYE ONCE DAILY IN THE MORNING   vitamin D3 25 mcg (1000 units) tablet   No No   Sig: Take 1 tablet (25 mcg) by mouth every other day      Facility-Administered Medications: None        Physical Exam   Vital Signs: Temp: 98.1  F (36.7  C) Temp src: Oral BP: (!) 143/65 Pulse: 89    Resp: 19 SpO2: 94 % O2 Device: None (Room air)    Weight: 0 lbs 0 oz    General Appearance: Grimacing intermittently, conversational  HEENT: PERRL, EOMI, sclerae anicteric, dry MMM, left facial droop (patient reports chronic, not documented)  Respiratory: CTAB on posterior auscultation with no increased WOB on RA  Cardiovascular: RRR, normal S1 and S2  GI: Soft, non-distended, tender to palpation suprapubic area  Skin: Warm, no rashes on exposed UE and LE  Extremities: Warm, trace pitting edema  Neurologic: AxOx4, left facial droop otherwise CN II-XII intact, shoulder abduction, elbow flexion and extension 5/5, hip flexion 4/5 b/l, dorsiflexion 4/5 b/l, right foot 3 beats of clonus      Data     I have personally reviewed the following data over the past 24 hrs:    22.0 (H)  \   9.0 (L)   / 63 (L)     140 106 46.5 (H) /  98   3.2 (L) 18 (L) 2.69 (H) \     ALT: 18 AST: 55 (H) AP: 122 TBILI: 0.4   ALB: 3.7 TOT PROTEIN: 6.7 LIPASE: N/A     Trop: 34 (H) BNP: 2,497 (H)       Imaging results reviewed over the past 24 hrs:   Recent Results (from the past 24 hour(s))   Cardiac Device Check - Remote   Result Value    Date Time Interrogation Session [id number]    Implantable Pulse Generator  Medtronic    Implantable Pulse Generator Model W1DR01 Beatrice DIA    Implantable Pulse Generator Serial Number [id number]    Type Interrogation Session Remote Patient Initiated    Clinic Name [facility]    Implantable Pulse Generator Type Pacemaker    Implantable Pulse Generator Implant Date 20191015    Implantable Lead  Medtronic    Implantable Lead Model 5076 CapSureFix Novus    Implantable Lead Serial Number [id number]    Implantable Lead Implant Date 20071128    Implantable Lead Polarity Type Bipolar Lead    Implantable Lead Location Detail 1 APPENDAGE    Implantable Lead Special Function 45 Length    Implantable Lead Location Right Atrium    Implantable Lead Connection Status  Connected    Implantable Lead  Medtronic    Implantable Lead Model 5076 CapSureFix Novus    Implantable Lead Serial Number [id number]    Implantable Lead Implant Date 20071128    Implantable Lead Polarity Type Bipolar Lead    Implantable Lead Location Detail 1 APEX    Implantable Lead Special Function 52 Length    Implantable Lead Location Right Ventricle    Implantable Lead Connection Status Connected    Seth Setting Mode (NBG Code) MVP_AAIR_DDDR    Seth Setting Lower Rate Limit 60    Seth Setting Maximum Tracking Rate 130    Seth Setting Maximum Sensor Rate 130    Seth Setting Hysterisis Rate DISABLED    Seth Setting JULIANN Delay Low 150    Seth Setting PAV Delay Low 180    Seth Setting AT Mode Switch Rate 171    Lead Channel Setting Sensing Polarity Bipolar    Lead Channel Setting Sensing Anode Location Right Atrium    Lead Channel Setting Sensing Anode Terminal Ring    Lead Channel Setting Sensing Cathode Location Right Atrium    Lead Channel Setting Sensing Cathode Terminal Tip    Lead Channel Setting Sensing Sensitivity 0.3    Lead Channel Setting Sensing Polarity Bipolar    Lead Channel Setting Sensing Anode Location Right Ventricle    Lead Channel Setting Sensing Anode Terminal Ring    Lead Channel Setting Sensing Cathode Location Right Ventricle    Lead Channel Setting Sensing Cathode Terminal Tip    Lead Channel Setting Sensing Sensitivity 0.9    Lead Channel Setting Pacing Polarity Bipolar    Lead Channel Setting Pacing Anode Location Right Atrium    Lead Channel Setting Pacing Anode Terminal Ring    Lead Channel Setting Sensing Cathode Location Right Atrium    Lead Channel Setting Sensing Cathode Terminal Tip    Lead Channel Setting Pacing Pulse Width 0.4    Lead Channel Setting Pacing Amplitude 1.5    Lead Channel Setting Pacing Capture Mode Adaptive    Lead Channel Setting Pacing Polarity Bipolar    Lead Channel Setting Pacing Anode Location Right Ventricle    Lead Channel Setting  Pacing Anode Terminal Ring    Lead Channel Setting Sensing Cathode Location Right Ventricle    Lead Channel Setting Sensing Cathode Terminal Tip    Lead Channel Setting Pacing Pulse Width 0.4    Lead Channel Setting Pacing Amplitude 2    Lead Channel Setting Pacing Capture Mode Adaptive    Zone Setting Type Category VF    Zone Setting Vendor Type Category V High Rate    Zone Setting Type Category VT    Zone Setting Vendor Type Category FastVT    Zone Setting Type Category VT    Zone Setting Vendor Type Category VT    Zone Setting Type Category VT    Zone Setting Vendor Type Category MonVT    Zone Setting Status Monitor    Zone Setting Detection Interval 400    Zone Setting Type Category ATRIAL_FIBRILLATION    Zone Setting Vendor Type Category FastATAF    Zone Setting Type Category AT/AF    Zone Setting Status Monitor    Zone Setting Detection Interval 350    Zone Setting Type Category BRADYCARDIA    Lead Channel Impedance Value 342    Lead Channel Impedance Value 304    Lead Channel Sensing Intrinsic Amplitude 2.375    Lead Channel Sensing Intrinsic Amplitude 2.375    Lead Channel Pacing Threshold Amplitude 0.5    Lead Channel Pacing Threshold Pulse Width 0.4    Lead Channel Impedance Value 418    Lead Channel Impedance Value 380    Lead Channel Sensing Intrinsic Amplitude 8.625    Lead Channel Sensing Intrinsic Amplitude 8.625    Lead Channel Pacing Threshold Amplitude 1    Lead Channel Pacing Threshold Pulse Width 0.4    Battery Date Time of Measurements 37179746703932    Battery Status OK    Battery RRT Trigger 2.625    Battery Remaining Longevity 110    Battery Voltage 3.00    Seth Statistic Date Time Start 52939692498201    Seth Statistic Date Time End 91716558339343    Seth Statistic RA Percent Paced 70.52    Seth Statistic RV Percent Paced 0.07    Seth Statistic AP  Percent 0.04    Seth Statistic AS  Percent 0.03    Seth Statistic AP VS Percent 70.25    Seth Statistic AS VS Percent 29.68    Atrial  Tachy Statistic Date Time Start 78963194075299    Atrial Tachy Statistic Date Time End 05114897613426    Atrial Tachy Statistic AT/AF [place] Percent 0.1    Therapy Statistic Recent Date Time Start 47107880629412    Therapy Statistic Recent Date Time End 51461153705997    Therapy Statistic Total  Date Time Start 39094206481842    Therapy Statistic Total  Date Time End 08712059864290    Episode Statistic Recent Count 0    Episode Statistic Type Category Patient Activated    Episode Statistic Recent Count 0    Episode Statistic Type Category SVT    Episode Statistic Recent Count 0    Episode Statistic Type Category VT    Episode Statistic Recent Count 0    Episode Statistic Type Category VT    Episode Statistic Recent Date Time Start 38105972873315    Episode Statistic Recent Date Time End 44469798437512    Episode Statistic Recent Date Time Start 20759460712371    Episode Statistic Recent Date Time End 82230572273026    Episode Statistic Recent Date Time Start 62653738678983    Episode Statistic Recent Date Time End 99661320360114    Episode Statistic Recent Date Time Start 77707363417864    Episode Statistic Recent Date Time End 47816846010400    Episode Statistic Total Count 6    Episode Statistic Type Category AT/AF    Episode Statistic Total Count 0    Episode Statistic Type Category Patient Activated    Episode Statistic Total Count 0    Episode Statistic Type Category SVT    Episode Statistic Total Count 0    Episode Statistic Type Category VT    Episode Statistic Total Count 0    Episode Statistic Type Category VT    Episode Statistic Total Date Time Start 16005604346159    Episode Statistic Total Date Time End 96090240112274    Episode Statistic Total Date Time Start 79200248845104    Episode Statistic Total Date Time End 24647402977407    Episode Statistic Total Date Time Start 89629985388214    Episode Statistic Total Date Time End 12962480984407    Episode Statistic Total Date Time Start 42056249498890     Episode Statistic Total Date Time End 89555322864058    Episode Statistic Total Date Time Start 91604880642324    Episode Statistic Total Date Time End 82081058437869    Narrative    Medtronic Carelink Express remote pacemaker transmission received from the ER and reviewed. Device transmission sent per MD orders.    Medtronic W1DR01 Beatrice XT  MRINormal Device Function  Mode: AAIR<=>DDDR  bpm  AP: 70.5%  : <0.1%  Presenting EGM: NSR with PVCs @ 92 bpm  Short V-V intervals: 0  Lead Trends Appear Stable: Yes  Estimated battery longevity to RRT = 9.2 years. Battery voltage = 3.00 V.   Atrial Arrhythmia: Total AT/AF time = 9 seconds.  AF [place]: <0.1%  Anticoagulant: Eliquis  Ventricular Arrhythmia: 0  ER RN Notified of Transmission Results: Yes    GERALDO Gomez RN    Remote pacemaker transmission    I have reviewed and interpreted the device interrogation, settings, programming and nurse's summary. The device is functioning within normal device parameters. I agree with the current findings, assessment and plan.   XR Chest 2 Views    Narrative    EXAM: XR CHEST 2 VIEWS  LOCATION: [facility]  DATE: 3/17/2024    INDICATION: Syncope, new elevated BNP  COMPARISON: 07/13/2022      Impression    IMPRESSION: Lungs are clear. No pleural effusion or pneumothorax. No definite edema. Normal heart size. Left chest implanted cardiac device. Coronary artery stent. IVC filter.

## 2024-03-17 NOTE — PLAN OF CARE
Goal Outcome Evaluation:      Plan of Care Reviewed With: patient    Overall Patient Progress: no changeOverall Patient Progress: no change     A&Ox4, forgetful. VSS on RA. Denies pain. SBA with GB. Started Iv abx. PIV TKO. Tolerating diet. Calls appropriately. No acute changes. POC continued.

## 2024-03-17 NOTE — PLAN OF CARE
Goal Outcome Evaluation:    Temp: 98.1  F (36.7  C) Temp src: Oral BP: 116/55 Pulse: 77   Resp: 23 SpO2: 94 % O2 Device: None (Room air)    Pt A&Ox4, forgetful, calls appropriately, AVSS on RA. Denies pain & nausea. Voiding frequently w/ bedpan, LBM PTA. No skin issues noted, R-PIV SL. Renal US ordered for today. Report given to CAROL Scott on 7C. Pt transferring to  & transport ordered.

## 2024-03-17 NOTE — PHARMACY-ADMISSION MEDICATION HISTORY
Pharmacist Admission Medication History    Admission medication history is complete. The information provided in this note is only as accurate as the sources available at the time of the update.    Information Source(s): Patient and CareEverywhere/SureScripts via in-person    Pertinent Information: Patient able to recall all medications and indications    Changes made to PTA medication list:  Added: None  Deleted: None  Changed: Lasix - patient takes an additional dose in the PM if lower extremities are swollen    Allergies reviewed with patient and updates made in EHR: yes    Medication History Completed By: Aaron Mcgrath [facility] 3/17/2024 7:44 AM    PTA Med List   Medication Sig Last Dose    allopurinol (ZYLOPRIM) 100 MG tablet Take 1 tablet by mouth once daily 3/16/2024 at AM    amLODIPine (NORVASC) 10 MG tablet Take 1 tablet (10 mg) by mouth daily 3/16/2024 at AM    apixaban ANTICOAGULANT (ELIQUIS) 2.5 MG tablet Take 1 tablet (2.5 mg) by mouth 2 times daily 3/16/2024 at PM    budesonide (PULMICORT) 0.5 MG/2ML neb solution Take 2 mLs (0.5 mg) by nebulization 2 times daily Past Month    calcium carbonate 500 mg, elemental, (OSCAL 500) 1250 (500 Ca) MG TABS tablet Take 1 tablet by mouth daily 3/16/2024 at AM    clopidogrel (PLAVIX) 75 MG tablet Take 1 tablet (75 mg) by mouth daily 3/16/2024 at AM    cyanocolbalamin (VITAMIN B-12) 1000 MCG tablet Take 500 mcg by mouth daily As directed 3/16/2024 at AM    ferrous sulfate (FEROSUL) 325 (65 Fe) MG tablet Take 1 tablet (325 mg) by mouth every other day 3/15/2024    fluticasone-salmeterol (ADVAIR) 250-50 MCG/ACT inhaler INHALE 1 DOSE BY MOUTH TWICE DAILY Past Week    furosemide (LASIX) 40 MG tablet Take 1 tablet (40 mg) by mouth 2 times daily (Patient taking differently: Take 40 mg by mouth daily Take an additional 40mg in the afternoon by mouth as needed for lower extremity swelling) 3/16/2024 at AM    hydrALAZINE (APRESOLINE) 10 MG tablet Take 3 tablets (30 mg) by mouth 3  times daily 3/16/2024 at PM    isosorbide mononitrate (ISMO/MONOKET) 20 MG tablet Take 2 tablets (40 mg) by mouth 3 times daily 3/16/2024 at PM    metoprolol tartrate (LOPRESSOR) 100 MG tablet Take 1 tablet by mouth twice daily 3/16/2024 at PM    Multiple Vitamins-Minerals (PRESERVISION AREDS 2+MULTI VIT PO) Take by mouth 2 times daily 3/16/2024 at PM    omeprazole (PRILOSEC) 40 MG DR capsule Take 1 capsule (40 mg) by mouth daily 3/16/2024 at AM    potassium chloride ER (K-TAB/KLOR-CON) 10 MEQ CR tablet TAKE 1 TABLET BY MOUTH IN THE MORNING AND 1 IN THE EVENING 3/16/2024 at AM    rosuvastatin (CRESTOR) 20 MG tablet Take 1 tablet (20 mg) by mouth daily for 360 days 3/16/2024 at PM    timolol maleate (TIMOPTIC) 0.5 % ophthalmic solution INSTILL 1 DROP INTO EACH EYE ONCE DAILY IN THE MORNING 3/16/2024 at AM    vitamin D3 25 mcg (1000 units) tablet Take 1 tablet (25 mcg) by mouth every other day 3/16/2024 at AM

## 2024-03-17 NOTE — PROGRESS NOTES
"   03/17/24 9151   Appointment Info   Signing Clinician's Name / Credentials (PT) Wendy Mane, PT, DPT       Present no   Living Environment   People in Home spouse   Current Living Arrangements house  (split-level)   Home Accessibility stairs to enter home;stairs within home   Number of Stairs, Main Entrance 2   Stair Railings, Main Entrance none   Number of Stairs, Within Home, Primary seven  (x3)   Stair Railings, Within Home, Primary railings safe and in good condition;railings on both sides of stairs   Transportation Anticipated family or friend will provide   Living Environment Comments Pt reports she lives in a split-level home with her spouse, who will be present/available to assist. 2 DAVEY without handrails. Must negotiate 7 steps up to bedroom and bathroom with tub/shower. 7 steps down to living room and bathroom with walk-in shower; an additional 7 steps down to laundry room. All with B handrails.  provides transportation.   Self-Care   Usual Activity Tolerance good   Current Activity Tolerance moderate   Regular Exercise No   Equipment Currently Used at Home cane, straight;walker, rolling;grab bar, toilet;grab bar, tub/shower;shower chair   Fall history within last six months yes   Number of times patient has fallen within last six months 1   Activity/Exercise/Self-Care Comment Reports IND/Bharathi with ADL's and mobility.  Admits to furniture surfing within home. Utilizes SPC for distances outside of home; 4WW for longer distances. Also owns a manual w/c and FWW. 1 fall which lead to admission.   General Information   Onset of Illness/Injury or Date of Surgery 03/17/24   Referring Physician Maria Fernanda Bush MD   Patient/Family Therapy Goals Statement (PT) return home   Pertinent History of Current Problem (include personal factors and/or comorbidities that impact the POC) per EMR: \"Tessa Mansfield is a 89 year old female with PMHx  CAD s/p multiple stents, cardiac " "pacemaker ([employer], dual-chamber), CKD stage 4 admitted on 3/17/2024 for UTI.\"   Existing Precautions/Restrictions fall   Weight-Bearing Status - LUE full weight-bearing   Weight-Bearing Status - RUE full weight-bearing   Weight-Bearing Status - LLE full weight-bearing   Weight-Bearing Status - RLE full weight-bearing   General Observations Activity: up with assist   Cognition   Orientation Status (Cognition) oriented x 4   Pain Assessment   Patient Currently in Pain No   Integumentary/Edema   Integumentary/Edema other (describe)   Integumentary/Edema Comments edema noted to BLE   Posture    Posture Forward head position;Protracted shoulders   Range of Motion (ROM)   Range of Motion ROM is WFL   Strength (Manual Muscle Testing)   Strength (Manual Muscle Testing) strength is WFL   Strength Comments grossly 3+/5 in BLE   Bed Mobility   Comment, (Bed Mobility) supine > sit with HOB elevated and supervision/SBA   Transfers   Comment, (Transfers) sit > stand with SBA/CGA and no AD   Gait/Stairs (Locomotion)   Distance in Feet (Gait) 8ft   Comment, (Gait/Stairs) Ambulated 8ft with CGA and \"furniture surfing\" around pt room   Balance   Balance other (describe)   Balance Comments fair(+) sitting balance; fair standing balance   Sensory Examination   Sensory Perception other (describe)   Sensory Perception Comments reports neuropathy to BLE   Coordination   Coordination no deficits were identified   Muscle Tone   Muscle Tone no deficits were identified   Clinical Impression   Criteria for Skilled Therapeutic Intervention Yes, treatment indicated   PT Diagnosis (PT) impaired functional mobility   Influenced by the following impairments decreased balance, strength, and endurance   Functional limitations due to impairments difficulty with functional mobility   Clinical Presentation (PT Evaluation Complexity) stable   Clinical Presentation Rationale per clinical judgment   Clinical Decision Making (Complexity) low complexity "   Planned Therapy Interventions (PT) balance training;bed mobility training;gait training;home exercise program;motor coordination training;neuromuscular re-education;patient/family education;postural re-education;ROM (range of motion);strengthening;stair training;stretching;transfer training;progressive activity/exercise;risk factor education;home program guidelines   Risk & Benefits of therapy have been explained evaluation/treatment results reviewed;care plan/treatment goals reviewed;risks/benefits reviewed;current/potential barriers reviewed;participants voiced agreement with care plan;participants included;patient;spouse/significant other;son   PT Total Evaluation Time   PT Eval, Low Complexity Minutes (81144) 5   Physical Therapy Goals   PT Frequency 6x/week   PT Predicted Duration/Target Date for Goal Attainment 03/24/24   PT: Bed Mobility Independent;Supine to/from sit;Rolling;Bridging  (with HOB flat)   PT: Transfers Modified independent;Sit to/from stand;Bed to/from chair  (with LRAD)   PT: Gait Modified independent;Greater than 200 feet  (with LRAD)   PT: Stairs Modified independent;7 stairs;Rail on both sides   PT: Goal 1 Patient will demonstrate a low risk for falls on any standardized balance assessment to reduce risk for readmission from falls.   PT Discharge Planning   PT Plan flat bed mobility, STS from various heights, gait with LRAD (no AD vs. walker vs. SPC), stairs   PT Discharge Recommendation (DC Rec) home with assist;home with outpatient physical therapy   PT Rationale for DC Rec Pt is below baseline for functional mobility. Limited by decreased balance. Will benefit from IP PT to reduce deconditioning and ensure safe d/c home with assist from family for ADL's and mobility using FWW. Will benefit from OP PT to reduce future falls and readmission. Pt and spouse in agreement to plan.   PT Brief overview of current status Ax1 with FWW and gait belt; up to chair and hallway walking 3-4x/day   PT  Equipment Needed at Discharge   (tbd, anticipated none)

## 2024-03-17 NOTE — ED PROVIDER NOTES
[facility] ([facility])    3/17/24       ED PROVIDER NOTE   History     Chief Complaint   Patient presents with    Generalized Weakness     Generalized weakness with dizziness and near syncopal episode tonight. Family states she was not acting herself and was incontinent. Unknown LOC       HPI  Tessa Mansfield is a 89 year old female with a past medical history of long standing HTN, CAD s/p multiple stents, cardiac pacemaker (Medtronic, dual-chamber), CKD stage 4, hyperlipidemia, GERD, anemia, orthostatic hypotension, hypokalemia, CHF, and NSTEMI who presents to the ED with multiple family members for evaluation of generalized weakness. Patient reports she fell out of bed trying to get up to use the bathroom. Patient reports she thinks she slid out of the bed and denies loss of consciousness. She states her mouth is very dry and she thinks she is dehydrated due to drinking more coffee than water. The patient's family member reports the patient had hypotension at home that worsened when she stood up. She also reports having nausea when she stood up. Patient dropped down at the bathroom door due to weakness and was able to regain strength shortly after to walk to the toilet. Patient states she urinated and had a bowel movement, and states she has loose bowel movements often. Patient notes she had a back ache yesterday and was unable to make it to her grandson's game. Patient states she currently feels pretty good. Denies history of similar symptoms. Denies any injuries. Denies hitting her head. Patient reports she is on Eliquis and Plavix due to placement of stents.       Past Medical History  Past Medical History:   Diagnosis Date    CAD (coronary artery disease)     CAD s/p PCI+BMS to MRCA 10/2002, PCI to mLCX 2/2003, PCI+DESx2 to pLAD 11/2007, PCI+DESx1 to dRCA 12/2007, PCI+ALVAREZ to RPDA 7/2013; on indefinite DAPT  10/27/2002    10/27/2002: AMI s/p [facility] (3.[facility]x18mm [facility]  2/5/2003: Back pain; PCI+stent to mLCX c/b distal embolization/slow flow 4/11/2003: Unstable angina; PCI+stent (Hepacoat velocity) to mLAD 11/27/2007: PCI+DESx2 to pLAD (IVUS w/ calcification of LMCA and ulcerated plaque pLAD, 80% dRCA; also had 80% dLCX, too small to stent) 12/11/2007: Staged PCI. PCI+DESx1 (3.5x13mm Cypher) to dRCA (indefinite DAPT recommended at this time) 6/27/2008: Angina. mLCX stent 70% ISR. LAD and RCA stents patent. Myocardium at risk from LCX felt to be small, medical management preferred to PCI. 11/10/2009: Angina. Findings unchanged from 6/27/2008, FFR LAD 0.90. Medical management recommended. 7/23/2013: Unstable angina; PCI+ALVAREZ to mPDA. Diagnostic findings: LMCA 40% distal. LAD: pLAD stents patent mLAD 30% ISR dLAD 50% diffuse, D2 diffuse disease. LCX 80% mid ISR. RCA diffuse <30% mid, RPLAs diffuse disease, RPDA 100% occlusion.      Cardiac Pacemaker- Medtronic, dual chamber- NOT dependant 11/28/2007    CKD (chronic kidney disease), stage IV (H)     Hyperlipidemia LDL goal <70     Hypertension     Stented coronary artery 10-    RCA    Stented coronary artery 2-5-2003    LCx    Stented coronary artery 4-    LAD    Stented coronary artery 11-    LAD    Stented coronary artery 12-    RCA    Transient complete heart block (H) 11/28/2007     Past Surgical History:   Procedure Laterality Date    CHOLECYSTECTOMY      CV CORONARY ANGIOGRAM N/A 6/17/2022    Procedure: Coronary Angiogram;  Surgeon: Constantine Macias MD;  Location:  HEART CARDIAC CATH LAB    CV CORONARY ANGIOGRAM N/A 7/17/2023    Procedure: Coronary Angiogram;  Surgeon: Constantine Macias MD;  Location: [facility] CARDIAC CATH LAB    CV PCI N/A 6/17/2022    Procedure: Percutaneous Coronary Intervention;  Surgeon: Constantine Macias MD;  Location: [facility] CARDIAC CATH LAB    CV PCI N/A 7/17/2023    Procedure: Percutaneous Coronary Intervention;  Surgeon: Constantine Macias MD;   Location:  HEART CARDIAC CATH LAB    CV PCI N/A 11/6/2023    Procedure: Percutaneous Coronary Intervention;  Surgeon: Constantine Macias MD;  Location:  HEART CARDIAC CATH LAB    CV RIGHT HEART CATH MEASUREMENTS RECORDED N/A 6/17/2022    Procedure: Right Heart Catheterization;  Surgeon: Constantine Macias MD;  Location:  HEART CARDIAC CATH LAB    EP PACEMAKER N/A 10/15/2019    Procedure: EP PACEMAKER;  Surgeon: Anthony Zhu MD;  Location:  HEART CARDIAC CATH LAB    ESOPHAGOSCOPY, GASTROSCOPY, DUODENOSCOPY (EGD), COMBINED N/A 7/20/2023    Procedure: Esophagoscopy, gastroscopy, duodenoscopy (EGD), combined;  Surgeon: Bekah Dash DO;  Location:  GI    HC PPM INSERTION NEW/REPLACEMENT W/ ATRIAL&VENTRICULAR LEAD  11-    HYSTERECTOMY       allopurinol (ZYLOPRIM) 100 MG tablet  amLODIPine (NORVASC) 10 MG tablet  apixaban ANTICOAGULANT (ELIQUIS) 2.5 MG tablet  aspirin (ASA) 325 MG EC tablet  budesonide (PULMICORT) 0.5 MG/2ML neb solution  calcium carbonate 500 mg, elemental, (OSCAL 500) 1250 (500 Ca) MG TABS tablet  clopidogrel (PLAVIX) 75 MG tablet  cyanocolbalamin (VITAMIN B-12) 1000 MCG tablet  ferrous sulfate (FEROSUL) 325 (65 Fe) MG tablet  fluticasone-salmeterol (ADVAIR) 250-50 MCG/ACT inhaler  furosemide (LASIX) 40 MG tablet  hydrALAZINE (APRESOLINE) 10 MG tablet  isosorbide mononitrate (ISMO/MONOKET) 20 MG tablet  metoprolol tartrate (LOPRESSOR) 100 MG tablet  Multiple Vitamins-Minerals (PRESERVISION AREDS 2+MULTI VIT PO)  omeprazole (PRILOSEC) 40 MG DR capsule  potassium chloride ER (K-TAB/KLOR-CON) 10 MEQ CR tablet  rosuvastatin (CRESTOR) 20 MG tablet  timolol maleate (TIMOPTIC) 0.5 % ophthalmic solution  vitamin D3 25 mcg (1000 units) tablet      Allergies   Allergen Reactions    Ciprofloxacin Rash    Codeine Sulfate Itching    Shrimp Swelling    Bactrim [Sulfamethoxazole W/Trimethoprim]      Patient unable to recall    Biaxin [Clarithromycin]     Chlorthalidone Nausea and  "Vomiting    Clonidine     Darvon [Propoxyphene Hcl]     Dilaudid [Hydromorphone] Visual Disturbance and Hallucination    Gabapentin Fatigue and Confusion     \"felt drunk\"    Indomethacin     Levaquin [Levofloxacin Hemihydrate]     Morphine Sulfate     Percocet [Oxycodone-Acetaminophen] Hallucination    Pregabalin Itching and Fatigue    Simvastatin Palpitations     Muscle weakness, leg cramping      Spironolactone      Dehydrated      Terazosin      Family History  Family History   Problem Relation Age of Onset    Cancer Sister 82        bladder cancer     Social History   Social History     Tobacco Use    Smoking status: Former     Packs/day: 0.30     Years: 15.00     Additional pack years: 0.00     Total pack years: 4.50     Types: Cigarettes    Smokeless tobacco: Never    Tobacco comments:     Quit 35+ years ago   Substance Use Topics    Drug use: No         A medically appropriate review of systems was performed with pertinent positives and negatives noted in the HPI, and all other systems negative.    Physical Exam   BP: 130/58  Pulse: 87  Temp: 98.1  F (36.7  C)  Resp: 16  SpO2: 97 %  Lying Orthostatic BP: 152/94  Lying Orthostatic Pulse: 87 bpm  Sitting Orthostatic BP: 155/74  Sitting Orthostatic Pulse: 90 bpm  Standing Orthostatic BP: 140/66  Standing Orthostatic Pulse: 90 bpm  Physical Exam  Vitals and nursing note reviewed.   Constitutional:       General: She is in acute distress.      Appearance: Normal appearance. She is ill-appearing. She is not toxic-appearing.   HENT:      Head: Normocephalic.      Nose: Nose normal.   Eyes:      Pupils: Pupils are equal, round, and reactive to light.   Cardiovascular:      Rate and Rhythm: Normal rate and regular rhythm.   Pulmonary:      Effort: Pulmonary effort is normal.   Abdominal:      General: There is no distension.      Palpations: Abdomen is soft.      Tenderness: There is no abdominal tenderness. There is no right CVA tenderness or left CVA tenderness. "   Musculoskeletal:         General: No deformity. Normal range of motion.      Cervical back: Normal range of motion.   Skin:     General: Skin is warm.   Neurological:      Mental Status: She is alert and oriented to person, place, and time.   Psychiatric:         Mood and Affect: Mood normal.           ED Course, Procedures, & Data      Procedures            EKG Interpretation:      Interpreted by Steven Camarena DO  Time reviewed: 0310  Symptoms at time of EKG: syncope   Rhythm: normal sinus   Rate: 95  Axis: normal  Ectopy: none  Conduction: normal  ST Segments/ T Waves: No ST-T wave changes  Q Waves: anterior  Comparison to prior: slight worsening of Q waves in anterior leads    Clinical Impression: anterior Q waves, o/w normal EKG          Results for orders placed or performed during the hospital encounter of 03/17/24   XR Chest 2 Views     Status: None    Narrative    EXAM: XR CHEST 2 VIEWS  LOCATION: [facility]  DATE: 3/17/2024    INDICATION: Syncope, new elevated BNP  COMPARISON: 07/13/2022      Impression    IMPRESSION: Lungs are clear. No pleural effusion or pneumothorax. No definite edema. Normal heart size. Left chest implanted cardiac device. Coronary artery stent. IVC filter.   Comprehensive metabolic panel     Status: Abnormal   Result Value Ref Range    Sodium 140 135 - 145 mmol/L    Potassium 3.2 (L) 3.4 - 5.3 mmol/L    Carbon Dioxide (CO2) 18 (L) 22 - 29 mmol/L    Anion Gap 16 (H) 7 - 15 mmol/L    Urea Nitrogen 46.5 (H) 8.0 - 23.0 mg/dL    Creatinine 2.69 (H) 0.51 - 0.95 mg/dL    GFR Estimate 17 (L) >60 mL/min/1.73m2    Calcium 8.7 (L) 8.8 - 10.2 mg/dL    Chloride 106 98 - 107 mmol/L    Glucose 98 70 - 99 mg/dL    Alkaline Phosphatase 122 40 - 150 U/L    AST 55 (H) 0 - 45 U/L    ALT 18 0 - 50 U/L    Protein Total 6.7 6.4 - 8.3 g/dL    Albumin 3.7 3.5 - 5.2 g/dL    Bilirubin Total 0.4 <=1.2 mg/dL   Troponin T, High Sensitivity     Status: Abnormal    Result Value Ref Range    Troponin T, High Sensitivity 34 (H) <=14 ng/L   Nt probnp inpatient (BNP)     Status: Abnormal   Result Value Ref Range    N terminal Pro BNP Inpatient 2,497 (H) 0 - 1,800 pg/mL   UA with Microscopic reflex to Culture     Status: Abnormal    Specimen: Urine, Clean Catch   Result Value Ref Range    Color Urine Light Yellow Colorless, Straw, Light Yellow, Yellow    Appearance Urine Clear Clear    Glucose Urine Negative Negative mg/dL    Bilirubin Urine Negative Negative    Ketones Urine Negative Negative mg/dL    Specific Gravity Urine 1.011 1.003 - 1.035    Blood Urine Moderate (A) Negative    pH Urine 5.5 5.0 - 7.0    Protein Albumin Urine 70 (A) Negative mg/dL    Urobilinogen Urine Normal Normal, 2.0 mg/dL    Nitrite Urine Positive (A) Negative    Leukocyte Esterase Urine Trace (A) Negative    RBC Urine 19 (H) <=2 /HPF    WBC Urine 15 (H) <=5 /HPF    Squamous Epithelials Urine <1 <=1 /HPF    Narrative    Urine Culture ordered based on laboratory criteria   CBC with platelets and differential     Status: Abnormal   Result Value Ref Range    WBC Count 22.0 (H) 4.0 - 11.0 10e3/uL    RBC Count 3.12 (L) 3.80 - 5.20 10e6/uL    Hemoglobin 9.0 (L) 11.7 - 15.7 g/dL    Hematocrit 29.3 (L) 35.0 - 47.0 %    MCV 94 78 - 100 fL    MCH 28.8 26.5 - 33.0 pg    MCHC 30.7 (L) 31.5 - 36.5 g/dL    RDW 15.9 (H) 10.0 - 15.0 %    Platelet Count 63 (L) 150 - 450 10e3/uL    % Neutrophils      % Lymphocytes      % Monocytes      % Eosinophils      % Basophils      % Immature Granulocytes      NRBCs per 100 WBC 1 (H) <1 /100    Absolute Neutrophils      Absolute Lymphocytes      Absolute Monocytes      Absolute Eosinophils      Absolute Basophils      Absolute Immature Granulocytes      Absolute NRBCs 0.2 10e3/uL   Troponin T, High Sensitivity     Status: Abnormal   Result Value Ref Range    Troponin T, High Sensitivity 34 (H) <=14 ng/L   Asymptomatic Influenza A/B, RSV, & SARS-CoV2 PCR (COVID-19) Nasopharyngeal      Status: Normal    Specimen: Nasopharyngeal; Swab   Result Value Ref Range    Influenza A PCR Negative Negative    Influenza B PCR Negative Negative    RSV PCR Negative Negative    SARS CoV2 PCR Negative Negative    Narrative    Testing was performed using the Xpert Xpress CoV2/Flu/RSV Assay on the Cepheid GeneXpert Instrument. This test should be ordered for the detection of SARS-CoV-2, influenza, and RSV viruses in individuals who meet clinical and/or epidemiological criteria. Test performance is unknown in asymptomatic patients. This test is for in vitro diagnostic use under the FDA EUA for laboratories certified under CLIA to perform high or moderate complexity testing. This test has not been FDA cleared or approved. A negative result does not rule out the presence of PCR inhibitors in the specimen or target RNA in concentration below the limit of detection for the assay. If only one viral target is positive but coinfection with multiple targets is suspected, the sample should be re-tested with another FDA cleared, approved, or authorized test, if coinfection would change clinical management. This test was validated by the [facility] [employer]. These laboratories are certified under the Clinical Laboratory Improvement Amendments of 1988 (CLIA-88) as qualified to perform high complexity laboratory testing.   Manual Differential     Status: Abnormal   Result Value Ref Range    % Neutrophils 82 %    % Lymphocytes 4 %    % Monocytes 14 %    % Eosinophils 0 %    % Basophils 0 %    NRBCs per 100 WBC 3 (H) <=0 %    Absolute Neutrophils 18.0 (H) 1.6 - 8.3 10e3/uL    Absolute Lymphocytes 0.9 0.8 - 5.3 10e3/uL    Absolute Monocytes 3.1 (H) 0.0 - 1.3 10e3/uL    Absolute Eosinophils 0.0 0.0 - 0.7 10e3/uL    Absolute Basophils 0.0 0.0 - 0.2 10e3/uL    Absolute NRBCs 0.7 (H) <=0.0 10e3/uL    RBC Morphology Confirmed RBC Indices     Platelet Assessment  Automated Count Confirmed. Platelet morphology is normal.      Automated Count Confirmed. Platelet morphology is normal.   CBC with platelets differential     Status: Abnormal    Narrative    The following orders were created for panel order CBC with platelets differential.  Procedure                               Abnormality         Status                     ---------                               -----------         ------                     CBC with platelets and d...[[id number]]  Abnormal            Final result               Manual Differential[[id number]]          Abnormal            Final result                 Please view results for these tests on the individual orders.   Results for orders placed or performed in visit on 03/17/24   Cardiac Device Check - Remote     Status: None (In process)   Result Value Ref Range    Date Time Interrogation Session [id number]     Implantable Pulse Generator  Medtronic     Implantable Pulse Generator Model W1DR01 [place] XT  MRI     Implantable Pulse Generator Serial Number [id number]     Type Interrogation Session Remote Patient Initiated     Clinic Name [facility]     Implantable Pulse Generator Type Pacemaker     Implantable Pulse Generator Implant Date 20191015     Implantable Lead  Medtronic     Implantable Lead Model 5076 CapSureFix Novus     Implantable Lead Serial Number [id number]     Implantable Lead Implant Date 20071128     Implantable Lead Polarity Type Bipolar Lead     Implantable Lead Location Detail 1 APPENDAGE     Implantable Lead Special Function 45 Length     Implantable Lead Location Right Atrium     Implantable Lead Connection Status Connected     Implantable Lead  Medtronic     Implantable Lead Model 5076 CapSureFix Novus     Implantable Lead Serial Number [id number]     Implantable Lead Implant Date 20071128     Implantable Lead Polarity Type Bipolar Lead     Implantable Lead Location Detail 1 APEX     Implantable Lead Special Function 52 Length      Implantable Lead Location Right Ventricle     Implantable Lead Connection Status Connected     Seth Setting Mode (NBG Code) [id number]     Seth Setting Lower Rate Limit 60 [beats]/min    Seth Setting Maximum Tracking Rate 130 [beats]/min    Seth Setting Maximum Sensor Rate 130 [beats]/min    Seth Setting Hysterisis Rate DISABLED     Seth Setting JULIANN Delay Low 150 ms    Seth Setting PAV Delay Low 180 ms    Seth Setting AT Mode Switch Rate 171 [beats]/min    Lead Channel Setting Sensing Polarity Bipolar     Lead Channel Setting Sensing Anode Location Right Atrium     Lead Channel Setting Sensing Anode Terminal Ring     Lead Channel Setting Sensing Cathode Location Right Atrium     Lead Channel Setting Sensing Cathode Terminal Tip     Lead Channel Setting Sensing Sensitivity 0.3 mV    Lead Channel Setting Sensing Polarity Bipolar     Lead Channel Setting Sensing Anode Location Right Ventricle     Lead Channel Setting Sensing Anode Terminal Ring     Lead Channel Setting Sensing Cathode Location Right Ventricle     Lead Channel Setting Sensing Cathode Terminal Tip     Lead Channel Setting Sensing Sensitivity 0.9 mV    Lead Channel Setting Pacing Polarity Bipolar     Lead Channel Setting Pacing Anode Location Right Atrium     Lead Channel Setting Pacing Anode Terminal Ring     Lead Channel Setting Sensing Cathode Location Right Atrium     Lead Channel Setting Sensing Cathode Terminal Tip     Lead Channel Setting Pacing Pulse Width 0.4 ms    Lead Channel Setting Pacing Amplitude 1.5 V    Lead Channel Setting Pacing Capture Mode Adaptive     Lead Channel Setting Pacing Polarity Bipolar     Lead Channel Setting Pacing Anode Location Right Ventricle     Lead Channel Setting Pacing Anode Terminal Ring     Lead Channel Setting Sensing Cathode Location Right Ventricle     Lead Channel Setting Sensing Cathode Terminal Tip     Lead Channel Setting Pacing Pulse Width 0.4 ms    Lead Channel Setting Pacing Amplitude 2 V     Lead Channel Setting Pacing Capture Mode Adaptive     Zone Setting Type Category VF     Zone Setting Vendor Type Category V High Rate     Zone Setting Type Category VT     Zone Setting Vendor Type Category FastVT     Zone Setting Type Category VT     Zone Setting Vendor Type Category VT     Zone Setting Type Category VT     Zone Setting Vendor Type Category MonVT     Zone Setting Status Monitor     Zone Setting Detection Interval 400 ms    Zone Setting Type Category ATRIAL_FIBRILLATION     Zone Setting Vendor Type Category FastATAF     Zone Setting Type Category AT/AF     Zone Setting Status Monitor     Zone Setting Detection Interval 350 ms    Zone Setting Type Category BRADYCARDIA     Lead Channel Impedance Value 342 ohm    Lead Channel Impedance Value 304 ohm    Lead Channel Sensing Intrinsic Amplitude 2.375 mV    Lead Channel Sensing Intrinsic Amplitude 2.375 mV    Lead Channel Pacing Threshold Amplitude 0.5 V    Lead Channel Pacing Threshold Pulse Width 0.4 ms    Lead Channel Impedance Value 418 ohm    Lead Channel Impedance Value 380 ohm    Lead Channel Sensing Intrinsic Amplitude 8.625 mV    Lead Channel Sensing Intrinsic Amplitude 8.625 mV    Lead Channel Pacing Threshold Amplitude 1 V    Lead Channel Pacing Threshold Pulse Width 0.4 ms    Battery Date Time of Measurements 78811962330901     Battery Status OK     Battery RRT Trigger 2.625     Battery Remaining Longevity 110 mo    Battery Voltage 3.00 V    Seth Statistic Date Time Start 20240110040801     Seth Statistic Date Time End 20240317023533     Seth Statistic RA Percent Paced 70.52 %    Seth Statistic RV Percent Paced 0.07 %    Seth Statistic AP  Percent 0.04 %    Seth Statistic AS  Percent 0.03 %    Seth Statistic AP VS Percent 70.25 %    Seth Statistic AS VS Percent 29.68 %    Atrial Tachy Statistic Date Time Start 69278886077501     Atrial Tachy Statistic Date Time End 20240317023533     Atrial Tachy Statistic AT/AF [place] Percent 0.1 %     Therapy Statistic Recent Date Time Start 90425265515515     Therapy Statistic Recent Date Time End 20506552044561     Therapy Statistic Total  Date Time Start 79775278513838     Therapy Statistic Total  Date Time End 43165358305776     Episode Statistic Recent Count 0     Episode Statistic Type Category Patient Activated     Episode Statistic Recent Count 0     Episode Statistic Type Category SVT     Episode Statistic Recent Count 0     Episode Statistic Type Category VT     Episode Statistic Recent Count 0     Episode Statistic Type Category VT     Episode Statistic Recent Date Time Start 04164343842319     Episode Statistic Recent Date Time End 02220879152251     Episode Statistic Recent Date Time Start 25182702131287     Episode Statistic Recent Date Time End 61362147887671     Episode Statistic Recent Date Time Start 90029188874069     Episode Statistic Recent Date Time End 29517287288043     Episode Statistic Recent Date Time Start 97345784550514     Episode Statistic Recent Date Time End 04051755263227     Episode Statistic Total Count 6     Episode Statistic Type Category AT/AF     Episode Statistic Total Count 0     Episode Statistic Type Category Patient Activated     Episode Statistic Total Count 0     Episode Statistic Type Category SVT     Episode Statistic Total Count 0     Episode Statistic Type Category VT     Episode Statistic Total Count 0     Episode Statistic Type Category VT     Episode Statistic Total Date Time Start 84933643820160     Episode Statistic Total Date Time End 37519935613409     Episode Statistic Total Date Time Start 47790597656769     Episode Statistic Total Date Time End 15380356700370     Episode Statistic Total Date Time Start 47916496215514     Episode Statistic Total Date Time End 73326295243397     Episode Statistic Total Date Time Start 84413191801769     Episode Statistic Total Date Time End 63396770471323     Episode Statistic Total Date Time Start 97298566790647      Episode Statistic Total Date Time End 94654958139815     Narrative    Medtronic Carelink Express remote pacemaker transmission received from the ER and reviewed. Device transmission sent per MD orders.    Medtronic [id number] [place] XT DR Diazrmal Device Function  Mode: AAIR<=>DDDR  bpm  AP: 70.5%  : <0.1%  Presenting EGM: NSR with PVCs @ 92 bpm  Short V-V intervals: 0  Lead Trends Appear Stable: Yes  Estimated battery longevity to RRT = 9.2 years. Battery voltage = 3.00 V.   Atrial Arrhythmia: Total AT/AF time = 9 seconds.  AF [place]: <0.1%  Anticoagulant: Eliquis  Ventricular Arrhythmia: 0  ER RN Notified of Transmission Results: Yes    GERALDO Gomez RN    Remote pacemaker transmission    I have reviewed and interpreted the device interrogation, settings, programming and nurse's summary. The device is functioning within normal device parameters. I agree with the current findings, assessment and plan.     Medications   cefTRIAXone (ROCEPHIN) 1 g vial to attach to  mL bag for ADULTS or NS 50 mL bag for PEDS (has no administration in time range)   sodium chloride 0.9% BOLUS 500 mL (0 mLs Intravenous Stopped 3/17/24 0329)     Labs Ordered and Resulted from Time of ED Arrival to Time of ED Departure   COMPREHENSIVE METABOLIC PANEL - Abnormal       Result Value    Sodium 140      Potassium 3.2 (*)     Carbon Dioxide (CO2) 18 (*)     Anion Gap 16 (*)     Urea Nitrogen 46.5 (*)     Creatinine 2.69 (*)     GFR Estimate 17 (*)     Calcium 8.7 (*)     Chloride 106      Glucose 98      Alkaline Phosphatase 122      AST 55 (*)     ALT 18      Protein Total 6.7      Albumin 3.7      Bilirubin Total 0.4     TROPONIN T, HIGH SENSITIVITY - Abnormal    Troponin T, High Sensitivity 34 (*)    NT PROBNP INPATIENT - Abnormal    N terminal Pro BNP Inpatient 2,497 (*)    ROUTINE UA WITH MICROSCOPIC REFLEX TO CULTURE - Abnormal    Color Urine Light Yellow      Appearance Urine Clear      Glucose Urine Negative      Bilirubin Urine  Negative      Ketones Urine Negative      Specific Gravity Urine 1.011      Blood Urine Moderate (*)     pH Urine 5.5      Protein Albumin Urine 70 (*)     Urobilinogen Urine Normal      Nitrite Urine Positive (*)     Leukocyte Esterase Urine Trace (*)     RBC Urine 19 (*)     WBC Urine 15 (*)     Squamous Epithelials Urine <1     CBC WITH PLATELETS AND DIFFERENTIAL - Abnormal    WBC Count 22.0 (*)     RBC Count 3.12 (*)     Hemoglobin 9.0 (*)     Hematocrit 29.3 (*)     MCV 94      MCH 28.8      MCHC 30.7 (*)     RDW 15.9 (*)     Platelet Count 63 (*)     % Neutrophils        % Lymphocytes        % Monocytes        % Eosinophils        % Basophils        % Immature Granulocytes        NRBCs per 100 WBC 1 (*)     Absolute Neutrophils        Absolute Lymphocytes        Absolute Monocytes        Absolute Eosinophils        Absolute Basophils        Absolute Immature Granulocytes        Absolute NRBCs 0.2     TROPONIN T, HIGH SENSITIVITY - Abnormal    Troponin T, High Sensitivity 34 (*)    DIFFERENTIAL - Abnormal    % Neutrophils 82      % Lymphocytes 4      % Monocytes 14      % Eosinophils 0      % Basophils 0      NRBCs per 100 WBC 3 (*)     Absolute Neutrophils 18.0 (*)     Absolute Lymphocytes 0.9      Absolute Monocytes 3.1 (*)     Absolute Eosinophils 0.0      Absolute Basophils 0.0      Absolute NRBCs 0.7 (*)     RBC Morphology Confirmed RBC Indices      Platelet Assessment        Value: Automated Count Confirmed. Platelet morphology is normal.   INFLUENZA A/B, RSV, & SARS-COV2 PCR - Normal    Influenza A PCR Negative      Influenza B PCR Negative      RSV PCR Negative      SARS CoV2 PCR Negative     LACTIC ACID WHOLE BLOOD   URINE CULTURE   BLOOD CULTURE   BLOOD CULTURE     XR Chest 2 Views   Final Result   IMPRESSION: Lungs are clear. No pleural effusion or pneumothorax. No definite edema. Normal heart size. Left chest implanted cardiac device. Coronary artery stent. IVC filter.             Critical care was  not performed.     Medical Decision Making  The patient's presentation was of high complexity (an acute health issue posing potential threat to life or bodily function).    The patient's evaluation involved:  review of 2 test result(s) ordered prior to this encounter (see separate area of note for details)  ordering and/or review of 3+ test(s) in this encounter (see separate area of note for details)    The patient's management necessitated high risk (a decision regarding hospitalization).    Assessment & Plan    Presents to the ED for evaluation of general malaise.  Had presyncopal event while attempting to go to the bathroom earlier.  Overall feels dehydrated.    On arrival, patient is normal blood pressure.  Slightly tachycardic.  She appears generally weak but has a nonfocal neuroexam.  Low suspicion for primary neurologic etiology    EKG shows some Q waves in the anterior leads which are new compared to previous, otherwise no signs of acute ischemia.  High sensitive troponin is 34.  Repeat at 2 hours is unchanged.  Low suspicion for ACS/acute ischemia.  proBNP is elevated at 2497.  No previous for comparison.  Most recent echo from 10/9/2023 showed EF of 55 to 60%.    CBC showed profound leukocytosis of 22.0.  Hemoglobin is 9.0.  ESR 63.  There is no obvious infection on exam/history so broaden workup to include UA, COVID/influenza, chest x-ray.  Chest x-ray reviewed independently negative for pneumonia, cardiomegaly, or fluid overload.  Radiology interpretation reviewed and in agreement.  COVID/influenza/RSV is negative.  Urinalysis does show nitrite positive urinary tract infection.  Patient started on ceftriaxone.  Lactic acid and blood cultures pending.    Metabolic panel shows slightly decreased potassium which was replaced in the ED.  Creatinine is 2.69 which is slightly increased from her baseline of approximately 2.2.    Discussed with the hospitalist for admission.      I have reviewed the nursing  notes. I have reviewed the findings, diagnosis, plan and need for follow up with the patient.    New Prescriptions    No medications on file       Final diagnoses:   Acute cystitis with hematuria   Weakness   Leukocytosis, unspecified type   I, Eduarda Dailey, am serving as a trained medical scribe to document services personally performed by Steven Camarena DO, based on the provider's statements to me.     I, Steven Camarena DO, was physically present and have reviewed and verified the accuracy of this note documented by Eduarda Dailey.     Steven Camarena DO  [facility] EMERGENCY DEPARTMENT  3/17/2024     Steven Camarena DO  03/17/24 0503

## 2024-03-17 NOTE — PLAN OF CARE
Occupational Therapy: Orders received. Chart reviewed and discussed with care team.? Occupational Therapy not indicated due to pt completing ADLs near functional baseline, will have 24/7 support from family. PT following for balance affecting mobility while inpatient, no acute OT needs identified.? Defer discharge recommendations to PT.? Will complete orders.

## 2024-03-17 NOTE — PROGRESS NOTES
"[facility], [place]   Antimicrobial Stewardship Team (AST) Gram-Negative Bacteremia Note    Antimicrobial Stewardship Program-- A joint venture between [facility] and  Physicians to optimize antibiotic management  NOT a formal Consult-Restricted Antibiotic Review  To: Med [place] 3 (Primary Team)  Unit: 7C  Allergies   Allergen Reactions    Ciprofloxacin Rash    Codeine Sulfate Itching    Shrimp Swelling    Bactrim [Sulfamethoxazole W/Trimethoprim]      Patient unable to recall    Biaxin [Clarithromycin]     Chlorthalidone Nausea and Vomiting    Clonidine     Darvon [Propoxyphene Hcl]     Dilaudid [Hydromorphone] Visual Disturbance and Hallucination    Gabapentin Fatigue and Confusion     \"felt drunk\"    Indomethacin     Levaquin [Levofloxacin Hemihydrate]     Morphine Sulfate     Percocet [Oxycodone-Acetaminophen] Hallucination    Pregabalin Itching and Fatigue    Simvastatin Palpitations     Muscle weakness, leg cramping      Spironolactone      Dehydrated      Terazosin        Positive blood culture resulted from Verigene  Organism identified: E. coli  Resistance gene detected: none    Brief Summary: Tessa Mansfield is a 89 year-old female with PMHx of HTN, CAD s/p multiple stents (on apixaban), PPM,  CHF, NSTEMI, HLD,  orthostatic hyoptension, CKD4, GERD, anemia,who presented 3/17/24 to the ED with generalized weakness and dizziness.   Per family report, patient was incontinent and experienced nausea. Patient also reports back ache day prior. In the ED, patient was afebrile and hemodynamically stable on room air. Labs notable for WBC 22 and procalcitonin of 13.80. CXR and CTH were both clear of acute abnormalities. US renal without hydronephrosis with distended bladder with intraluminal layering and mobile debris that can be seen with infection. Given suspicion for UTI, ceftriaxone was started. COVID/flu/RSV negative. Blood cultures updated 3/4 GNB identified as E. " Coli on Verigene without resistance detected. Urine culture pending.  Assessment: E. Coli bacteremia 2/2 UTI  Source of bacteremia as UTI is consistent with symptoms reported and agree with IV ceftriaxone. Although patient reported some back pain, imaging was not entirely suggestive of pyelonephritis versus cystitis given lack of obvious hydronephrosis or fat stranding seen with upper UTI, although systemic spread would be more common in an upper UTI than lower UTI. Regardless in the setting of bacteremia, would treat for minimum of 7 day duration with clinical improvement. Even with concern for atypical pyelonephritis would not anticipate need to extend beyond 10 days of therapy given uncomplicated presentation. Continue ceftriaxone for now as IV would be preferred therapy. There is some evidence for use of PO antibiotics in the setting of gram-negative bacteremia. Later in course may consider final blood and urine culture data to guide de-escalation to a highly bioavailable oral agent. Preference would be for a fluoroquinolone or Bactrim (5mg/kg PO q12h or about 2 DS tabs q12h for bacteremia dosing) although patient has reported allergies to both of these agents, and fluoroquinolones should be used cautiously in elderly patients with renal dysfunction based on side effect profile.  Recommendations:  Agree with current management with ceftriaxone 2g IV q24h with expected duration of 7-10 days (end date ~3/17/24)  If considering transition to PO agent later in course based on finalized sensitivities, recommend clarify with patient allergies for preferred highly-bioavailable agents (fluoroquinolones, Bactrim) to determine eligibility for switch    Patient will be followed by Sharee Mas (please contact if further questions)  AMT pager: [phone]      Current antibiotics  Anti-infectives (From now, onward)      Start     Dose/Rate Route Frequency Ordered Stop    03/18/24 0430  cefTRIAXone (ROCEPHIN) 2 g vial  to attach to  ml bag for ADULTS or NS 50 ml bag for PEDS         2 g  over 30 Minutes Intravenous EVERY 24 HOURS 03/17/24 3582

## 2024-03-17 NOTE — PROGRESS NOTES
Reason for admission: UTI  Admitted from:  [facility]  Report received from:  John RN         2 RN skin assessment completed by: Tyler MEDINA      - Findings (add LDA if needed): Bruising in BLE, Skin tags on chest, blanchable redness in caridad area.   Bed algorithm reevaluated: no  Was airflow pump ordered?: no  Suction set up in room? yes  Care plan (primary problem) and education initiated: Infection, comorbidity - HTN  MDRO education done if applicable: yes  Pt informed about policy regarding no IV pumps off unit: yes  Flu shot ordered? (October-April only): no  Detailed Belongings: Clothing

## 2024-03-17 NOTE — ED TRIAGE NOTES
"Patient was in bed tonight, felt \"very sick\" and went to get out of bed. She fell to floor and was incontinent. Unknown LOC. EMS reports orthostatic blood pressures.      Triage Assessment (Adult)       Row Name 03/17/24 0132          Triage Assessment    Airway WDL WDL        Respiratory WDL    Respiratory WDL WDL        Skin Circulation/Temperature WDL    Skin Circulation/Temperature WDL WDL        Cardiac WDL    Cardiac WDL WDL     Cardiac Rhythm NSR        Peripheral/Neurovascular WDL    Peripheral Neurovascular WDL WDL        Cognitive/Neuro/Behavioral WDL    Cognitive/Neuro/Behavioral WDL WDL                     "

## 2024-03-17 NOTE — PROGRESS NOTES
[facility]    Progress Note - Medicine Service, MAROON TEAM 3       Date of Admission:  3/17/2024    Assessment & Plan   Tessa Mansfield is a 89 year old female with pmh of htn, CAD and MI s/p stent placements, sinus node dysfunction s/p dual chamber pacemaker placement, afib, and stage 4 CKD presenting on 3/17/2024 with acute falls and concern for acute pyelonephritis.    # Acute cystitis  # Concern for acute Pyelonephritis  One month of incontinence and a few weeks of dysuria with occasional suprapubic tenderness, none on exam today. Found to have Leukocytosis with neutrophilic predominance. Elevated inflammatory markers with procal at 13 and CRP at 41. Negative RSV/flu/covid swab. CXR negative. UA demonstrated positive nitrites and leukocyte esterase with increased WBC and RBC, culture pending. Abdominal CT ideal but contraindicated due to CKD. Bilateral renal ultrasound without hydronephrosis but urinary bladder debris present. +CVA tenderness on exam. With evidence of UTI and +CVA tenderness, dx of atypical presentation of uncomplicated pyelonephritis is most likely, possibly exacerbated by dehydration or urinary retention.  - Blood cultures collected - pending  - Urine culture pending  - 1 g IV ceftriaxone (3/17-*), pending cultures can transition to appropriate oral antibiotic   - Post void scan ordered to assess for retention  - PRN acetaminophen 650 for pain/fever    #Acute syncope with falls  #Weakness  No prior history of falls, but some baseline instability with peripheral neuropathy. Two falls on 3/16 without loss of consciousness, lightheadedness, or dizziness. Family was able to assist the patient with getting up. Per family, patient had decreased blood pressure after falls. 500 mL NS bolus in ED, EKG without concern for acute cardiac pathology and troponin stable at 34. CK normal. Differential includes most likely orthostatic hypotension given  fluctuations in blood pressure. Additional strong consideration for infection given diagnostics discussed below. Considered broad differential including toxin-mediated, seizure, new cardiac pathology, and spinal cord abscess, however, highly unlikely given history, PE, and lab information.  - CT head negative for acute intracranial pathology  - Normal B12 and folate labs  - PT/OT    #Mild anion gap metabolic acidosis, improving  Anion gap of 16. Lactate normal, elevated blood ketones. Possible due to infection, dehydration, or decreased oral intake.  - Monitor on CMP  - Fluids as needed    #Mild SHAJI vs CKD progression  #Hypokalemia  #Stage IV CKD with proteinuria  Follows with Dr. Martin at [facility]. Baseline creatinine 1.8-2.2 per documentation. Creatinine at 2.69 on admission, concern for mild SHAJI 2/2 infection or dehydration. 500 mL NS in ED.  - Fluid bolus as needed. Trend creatinine.  - Continue potassium chloride 10 meq BID - electrolyte replacement protocol  ________________________________  Chronic/stable:    #Essential hypertension  Typical systolic BPs in 130s per patient. Systolic pressures in 110s on presentation to ED, now stable in 130s. Holding anti-hypertensive medications in the setting of acute illness  - Hold PTA Amlodipine 10 mg daily  - Hold PTA Furosemide 40 mg BID  - Hold PTA Hydralazine 10  mg TID  - Hold PTA Isosorbide mononitrate 40 mg TID  - Hold PTA Metoprolol tartrate 100 mg BID    #CAD  #History of MI  #Sinus node dysfunction s/p pacemaker placement  #Paroxysmal atrial fibrillation  #Thrombocytopenia due to medication  Follows with Dr. Santoyo and Dr. Zhu at [facility]. Hx of CAD and MI with eight stents placed in 2004. Additional hx of sinus node dysfunction s/p dual chamber medtronic pacemaker placed in 2007. Diagnosis of atrial fibrillation in 2023. Inpt EKG without concern for acute cardiac pathology and troponin stable at 34. BNP 2000+ with history  convincing for CHF. Platelets in 60s, thrombocytopenia previously documented in notes.  - Continue Apixaban 2.5 mg BID  - Continue Clopidogrel 75 mg daily  - Continue Rosuvastatin 20 mg daily    #Dyspnea on exertion  #Chronic cough  - Continue PTA Budesonide 0.5 mg/2mL  - Provided Breo Ellipta 100-25 mcg interchanged for PTA Advair 250-50 mcg daily    #Idiopathic gout  - Hold PTA Allopurinol 100 mg daily    #Chronic anemia  - Continue Ferrous sulfate 325 mg    #GERD  - Provided pantoprazole 40 mg BID interchanged for PTA Omeprazole 40 mg daily  - PRN tums    #Glaucoma  - Continue PTA Timolol ophthalmic solution    #Vitamin supplementation  - Continue PTA Vitamin B12 1000 mcg  - Continue PTA Vitamin D3 25 mcg every other day    #History of malignancy  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    #Chronic diarrhea  Several months of diarrhea at baseline, stable.    Resident/Fellow Attestation   I, Hemal Humphrey MD, was present with the medical/NAPOLEON student who participated in the service and in the documentation of the note.  I have verified the history and personally performed the physical exam and medical decision making.  I agree with the assessment and plan of care as documented in the note.        Hemal Humphrey MD  PGY1  Date of Service (when I saw the patient): 03/17/24           Diet: Combination Diet Low Saturated Fat Na <2400mg Diet, No Caffeine Diet    DVT Prophylaxis: DOAC and Pneumatic Compression Devices  Bonner Catheter: Not present  Fluids: Bolus as needed  Lines: None     Cardiac Monitoring: None  Code Status: Full Code      Clinically Significant Risk Factors Present on Admission        # Hypokalemia: Lowest K = 3.2 mmol/L in last 2 days, will replace as needed        # Drug Induced Coagulation Defect: home medication list includes an anticoagulant medication  # Drug  Induced Platelet Defect: home medication list includes an antiplatelet medication  # Acute Kidney Injury, unspecified: based on a >150% or 0.3 mg/dL increase in last creatinine compared to past 90 day average, will monitor renal function  # Hypertension: Noted on problem list  # Chronic heart failure with preserved ejection fraction: heart failure noted on problem list and last echo with EF >50%          # Pacemaker present       Disposition Plan      Expected Discharge Date: 03/19/2024                The patient's care was discussed with the Attending Physician, Dr. De Jesus and Patient.    DELATORRE Standard  Medical Student  Medicine Service, [address]  Securely message with [employer] (more info)  Text page via [facility] Paging/Directory   See signed in provider for up to date coverage information  ______________________________________________________________________    Interval History   See admission HPI for more details. Subjectively feels very tired. Answers questions appropriately although occasionally appears confused. No pain anywhere at rest.    Physical Exam   Vital Signs: Temp: 98.1  F (36.7  C) Temp src: Oral BP: 116/55 Pulse: 77   Resp: 23 SpO2: 94 % O2 Device: None (Room air)    Weight: 0 lbs 0 oz    General Appearance: Appears stated age, lying comfortably in bed. Responds appropriately to questions.  Respiratory: No increased work of breathing, comfortable on RA. All lung fields clear to auscultation bilaterally.  Cardiovascular: Appears well perfused. HR regular in rate and rhythm with the exception of a few skipped beats. Soft grade I/II systolic murmur auscultated at left upper sternal border.  GI: Bowel sounds present. Appears non-distended. Soft, non-tender to palpation in all four quadrants. Possible splenomegaly. No hepatomegaly. No suprapubic tenderness on exam.  Renal: +CVA tenderness on left side, later on PE +CVA tenderness on  right.  Musculoskeletal: No point tenderness on firm palpation of cervical, thoracic, and lumbar spine  Skin: Black, scabbed areas of skin on tip of nose and posterior-lateral right neck. Multiple skin tags on chest. Several macules of seborrheic keratosis on abdomen. Diffuse bruising on bilateral lower extremities. No rash noted.    Medical Decision Making       Please see A&P for additional details of medical decision making.      Data     I have personally reviewed the following data over the past 24 hrs:    22.0 (H)  \   9.0 (L)   / 63 (L)     140 106 46.5 (H) /  98   3.2 (L) 18 (L) 2.69 (H) \     ALT: 18 AST: 55 (H) AP: 122 TBILI: 0.4   ALB: 3.7 TOT PROTEIN: 6.7 LIPASE: N/A     Trop: 34 (H) BNP: 2,497 (H)     Procal: 13.80 (HH) CRP: 41.00 (H) Lactic Acid: 1.3

## 2024-03-18 ENCOUNTER — APPOINTMENT (OUTPATIENT)
Dept: CT IMAGING | Facility: CLINIC | Age: 89
DRG: 872 | End: 2024-03-18
Payer: COMMERCIAL

## 2024-03-18 ENCOUNTER — APPOINTMENT (OUTPATIENT)
Dept: PHYSICAL THERAPY | Facility: CLINIC | Age: 89
DRG: 872 | End: 2024-03-18
Payer: COMMERCIAL

## 2024-03-18 LAB
ALBUMIN SERPL BCG-MCNC: 3.4 G/DL (ref 3.5–5.2)
ALP SERPL-CCNC: 93 U/L (ref 40–150)
ALT SERPL W P-5'-P-CCNC: 22 U/L (ref 0–50)
ANION GAP SERPL CALCULATED.3IONS-SCNC: 13 MMOL/L (ref 7–15)
AST SERPL W P-5'-P-CCNC: 41 U/L (ref 0–45)
BASOPHILS # BLD AUTO: ABNORMAL 10*3/UL
BASOPHILS # BLD MANUAL: 0 10E3/UL (ref 0–0.2)
BASOPHILS NFR BLD AUTO: ABNORMAL %
BASOPHILS NFR BLD MANUAL: 0 %
BILIRUB SERPL-MCNC: 0.2 MG/DL
BUN SERPL-MCNC: 51.5 MG/DL (ref 8–23)
BURR CELLS BLD QL SMEAR: SLIGHT
CALCIUM SERPL-MCNC: 8.3 MG/DL (ref 8.8–10.2)
CHLORIDE SERPL-SCNC: 111 MMOL/L (ref 98–107)
CREAT SERPL-MCNC: 3.02 MG/DL (ref 0.51–0.95)
DEPRECATED HCO3 PLAS-SCNC: 18 MMOL/L (ref 22–29)
EGFRCR SERPLBLD CKD-EPI 2021: 14 ML/MIN/1.73M2
EOSINOPHIL # BLD AUTO: ABNORMAL 10*3/UL
EOSINOPHIL # BLD MANUAL: 0.2 10E3/UL (ref 0–0.7)
EOSINOPHIL NFR BLD AUTO: ABNORMAL %
EOSINOPHIL NFR BLD MANUAL: 1 %
ERYTHROCYTE [DISTWIDTH] IN BLOOD BY AUTOMATED COUNT: 15.9 % (ref 10–15)
GLUCOSE SERPL-MCNC: 94 MG/DL (ref 70–99)
HCT VFR BLD AUTO: 25.8 % (ref 35–47)
HGB BLD-MCNC: 8 G/DL (ref 11.7–15.7)
IMM GRANULOCYTES # BLD: ABNORMAL 10*3/UL
IMM GRANULOCYTES NFR BLD: ABNORMAL %
LACTATE SERPL-SCNC: 1.1 MMOL/L (ref 0.7–2)
LYMPHOCYTES # BLD AUTO: ABNORMAL 10*3/UL
LYMPHOCYTES # BLD MANUAL: 2.6 10E3/UL (ref 0.8–5.3)
LYMPHOCYTES NFR BLD AUTO: ABNORMAL %
LYMPHOCYTES NFR BLD MANUAL: 12 %
MAGNESIUM SERPL-MCNC: 2 MG/DL (ref 1.7–2.3)
MCH RBC QN AUTO: 28.9 PG (ref 26.5–33)
MCHC RBC AUTO-ENTMCNC: 31 G/DL (ref 31.5–36.5)
MCV RBC AUTO: 93 FL (ref 78–100)
MONOCYTES # BLD AUTO: ABNORMAL 10*3/UL
MONOCYTES # BLD MANUAL: 4.2 10E3/UL (ref 0–1.3)
MONOCYTES NFR BLD AUTO: ABNORMAL %
MONOCYTES NFR BLD MANUAL: 19 %
NEUTROPHILS # BLD AUTO: ABNORMAL 10*3/UL
NEUTROPHILS # BLD MANUAL: 14.9 10E3/UL (ref 1.6–8.3)
NEUTROPHILS NFR BLD AUTO: ABNORMAL %
NEUTROPHILS NFR BLD MANUAL: 68 %
NRBC # BLD AUTO: 0 10E3/UL
NRBC BLD AUTO-RTO: 0 /100
PHOSPHATE SERPL-MCNC: 3.2 MG/DL (ref 2.5–4.5)
PLAT MORPH BLD: ABNORMAL
PLATELET # BLD AUTO: 57 10E3/UL (ref 150–450)
POTASSIUM SERPL-SCNC: 3.9 MMOL/L (ref 3.4–5.3)
PROT SERPL-MCNC: 6.1 G/DL (ref 6.4–8.3)
RBC # BLD AUTO: 2.77 10E6/UL (ref 3.8–5.2)
RBC MORPH BLD: ABNORMAL
SODIUM SERPL-SCNC: 142 MMOL/L (ref 135–145)
WBC # BLD AUTO: 21.9 10E3/UL (ref 4–11)

## 2024-03-18 PROCEDURE — 250N000013 HC RX MED GY IP 250 OP 250 PS 637

## 2024-03-18 PROCEDURE — 36415 COLL VENOUS BLD VENIPUNCTURE: CPT

## 2024-03-18 PROCEDURE — 80053 COMPREHEN METABOLIC PANEL: CPT

## 2024-03-18 PROCEDURE — 250N000011 HC RX IP 250 OP 636

## 2024-03-18 PROCEDURE — 74176 CT ABD & PELVIS W/O CONTRAST: CPT | Mod: 26 | Performed by: RADIOLOGY

## 2024-03-18 PROCEDURE — 120N000002 HC R&B MED SURG/OB UMMC

## 2024-03-18 PROCEDURE — 83605 ASSAY OF LACTIC ACID: CPT | Performed by: HOSPITALIST

## 2024-03-18 PROCEDURE — 258N000003 HC RX IP 258 OP 636

## 2024-03-18 PROCEDURE — 85007 BL SMEAR W/DIFF WBC COUNT: CPT

## 2024-03-18 PROCEDURE — 84100 ASSAY OF PHOSPHORUS: CPT

## 2024-03-18 PROCEDURE — 99233 SBSQ HOSP IP/OBS HIGH 50: CPT | Mod: GC | Performed by: INTERNAL MEDICINE

## 2024-03-18 PROCEDURE — 36415 COLL VENOUS BLD VENIPUNCTURE: CPT | Performed by: HOSPITALIST

## 2024-03-18 PROCEDURE — 97530 THERAPEUTIC ACTIVITIES: CPT | Mod: GP

## 2024-03-18 PROCEDURE — 74176 CT ABD & PELVIS W/O CONTRAST: CPT

## 2024-03-18 PROCEDURE — 83735 ASSAY OF MAGNESIUM: CPT

## 2024-03-18 PROCEDURE — 97116 GAIT TRAINING THERAPY: CPT | Mod: GP

## 2024-03-18 PROCEDURE — 85014 HEMATOCRIT: CPT

## 2024-03-18 PROCEDURE — 999N000128 HC STATISTIC PERIPHERAL IV START W/O US GUIDANCE

## 2024-03-18 RX ORDER — SODIUM CHLORIDE, SODIUM LACTATE, POTASSIUM CHLORIDE, CALCIUM CHLORIDE 600; 310; 30; 20 MG/100ML; MG/100ML; MG/100ML; MG/100ML
INJECTION, SOLUTION INTRAVENOUS CONTINUOUS
Status: ACTIVE | OUTPATIENT
Start: 2024-03-18 | End: 2024-03-18

## 2024-03-18 RX ADMIN — POTASSIUM CHLORIDE 10 MEQ: 750 TABLET, EXTENDED RELEASE ORAL at 08:45

## 2024-03-18 RX ADMIN — APIXABAN 2.5 MG: 2.5 TABLET, FILM COATED ORAL at 19:20

## 2024-03-18 RX ADMIN — CLOPIDOGREL BISULFATE 75 MG: 75 TABLET ORAL at 08:45

## 2024-03-18 RX ADMIN — PANTOPRAZOLE SODIUM 40 MG: 40 TABLET, DELAYED RELEASE ORAL at 19:20

## 2024-03-18 RX ADMIN — PANTOPRAZOLE SODIUM 40 MG: 40 TABLET, DELAYED RELEASE ORAL at 08:45

## 2024-03-18 RX ADMIN — CEFTRIAXONE SODIUM 2 G: 2 INJECTION, POWDER, FOR SOLUTION INTRAMUSCULAR; INTRAVENOUS at 04:32

## 2024-03-18 RX ADMIN — ACETAMINOPHEN 650 MG: 325 TABLET, FILM COATED ORAL at 20:53

## 2024-03-18 RX ADMIN — SODIUM CHLORIDE, POTASSIUM CHLORIDE, SODIUM LACTATE AND CALCIUM CHLORIDE: 600; 310; 30; 20 INJECTION, SOLUTION INTRAVENOUS at 11:03

## 2024-03-18 RX ADMIN — CYANOCOBALAMIN TAB 500 MCG 500 MCG: 500 TAB at 08:45

## 2024-03-18 RX ADMIN — POTASSIUM CHLORIDE 10 MEQ: 750 TABLET, EXTENDED RELEASE ORAL at 19:20

## 2024-03-18 RX ADMIN — ACETAMINOPHEN 650 MG: 325 TABLET, FILM COATED ORAL at 05:56

## 2024-03-18 RX ADMIN — ROSUVASTATIN CALCIUM 20 MG: 20 TABLET, FILM COATED ORAL at 08:45

## 2024-03-18 RX ADMIN — APIXABAN 2.5 MG: 2.5 TABLET, FILM COATED ORAL at 08:45

## 2024-03-18 RX ADMIN — CALCIUM 500 MG: 500 TABLET ORAL at 08:45

## 2024-03-18 ASSESSMENT — ACTIVITIES OF DAILY LIVING (ADL)
ADLS_ACUITY_SCORE: 36
DEPENDENT_IADLS:: INDEPENDENT
ADLS_ACUITY_SCORE: 36

## 2024-03-18 NOTE — PROGRESS NOTES
Care Management Initial Consult    General Information  Assessment completed with: Patient,    Type of CM/SW Visit: Initial Assessment    Primary Care Provider verified and updated as needed: Yes   Readmission within the last 30 days: no previous admission in last 30 days      Reason for Consult: discharge planning  Advance Care Planning: Advance Care Planning Reviewed: no concerns identified, verified with patient (NOK= )          Communication Assessment  Patient's communication style: spoken language (English or Bilingual)    Hearing Difficulty or Deaf: yes   Wear Glasses or Blind: yes    Cognitive  Cognitive/Neuro/Behavioral: WDL  Level of Consciousness: intermittent confusion  Arousal Level: opens eyes spontaneously  Orientation: oriented x 4  Mood/Behavior: calm, cooperative  Best Language: 0 - No aphasia  Speech: clear, spontaneous, logical    Living Environment:   People in home: spouse     Current living Arrangements: house (2 stairs from garage entrance, 7 stairs going from ground level inside to upstairs, states 21 stairs total inside if she goes to the basement but only does that for laundry)      Able to return to prior arrangements: yes       Family/Social Support:  Care provided by: self  Provides care for: no one  Marital Status:   , Children, Other (specify) (16 grandkids and some great grandkids so lots of help)          Description of Support System: Supportive, Involved    Support Assessment: Adequate family and caregiver support, Adequate social supports    Current Resources:   Patient receiving home care services: No     Community Resources: None  Equipment currently used at home: cane, straight, walker, rolling, grab bar, toilet, grab bar, tub/shower, shower chair  Supplies currently used at home: Incontinence Supplies    Employment/Financial:  Employment Status: retired (worked at [employer] before)        Financial Concerns: none   Referral to Financial Worker:  No       Does the patient's insurance plan have a 3 day qualifying hospital stay waiver?  No    Lifestyle & Psychosocial Needs:  Social Determinants of Health     Food Insecurity: Low Risk  (11/15/2023)    Food Insecurity     Within the past 12 months, did you worry that your food would run out before you got money to buy more?: No     Within the past 12 months, did the food you bought just not last and you didn t have money to get more?: No   Depression: Not at risk (1/2/2024)    PHQ-2     PHQ-2 Score: 0   Housing Stability: Low Risk  (11/15/2023)    Housing Stability     Do you have housing? : Yes     Are you worried about losing your housing?: No   Tobacco Use: Medium Risk (3/17/2024)    Patient History     Smoking Tobacco Use: Former     Smokeless Tobacco Use: Never     Passive Exposure: Not on file   Financial Resource Strain: Low Risk  (11/15/2023)    Financial Resource Strain     Within the past 12 months, have you or your family members you live with been unable to get utilities (heat, electricity) when it was really needed?: No   Alcohol Use: Not on file   Transportation Needs: Low Risk  (11/15/2023)    Transportation Needs     Within the past 12 months, has lack of transportation kept you from medical appointments, getting your medicines, non-medical meetings or appointments, work, or from getting things that you need?: No   Physical Activity: Not on file   Interpersonal Safety: Low Risk  (11/15/2023)    Interpersonal Safety     Do you feel physically and emotionally safe where you currently live?: Yes     Within the past 12 months, have you been hit, slapped, kicked or otherwise physically hurt by someone?: No     Within the past 12 months, have you been humiliated or emotionally abused in other ways by your partner or ex-partner?: No   Stress: Not on file   Social Connections: Not on file       Functional Status:  Prior to admission patient needed assistance:   Dependent ADLs:: Ambulation-cane,  Ambulation-walker (mostly able to grab things at home, uses cane sometimes like grocery shopping, uses walker for chair w/ extended outings)  Dependent IADLs:: Independent       Mental Health Status:  Mental Health Status: No Current Concerns       Chemical Dependency Status:  Chemical Dependency Status: No Current Concerns             Values/Beliefs:  Spiritual, Cultural Beliefs, Jain Practices, Values that affect care: yes  Description of Beliefs that Will Affect Care: Mosque       Values/Beliefs Comment: wearing a Worship necklace    Additional Information:  Met with patient at bedside who confirmed her PCP and although she doesn't have a healthcare directive she understands that her  is NOK. She lives in a house with a lot of stairs (21 total) and it sounds like she has adapted well to her physical limitations like going down to the basement she slides the basket one stair down at a time. She is able to drive and still does as most things are located about 1 mile from her. Her  is completley independent, but they are going to transition to a town home soon with little to no stairs. She has two canes one for main floor and one for downstairs and only utilizes a walker for fatigue as it has a seat for extended outings. She completed OP PT/OT several times. The only supplies she uses is for incontinence, and does not utilize and community resources other than social security. No further questions or concerns at this time.         CAMACHO Rizvi, [facility]   Covering [address]  Ph: [phone]

## 2024-03-18 NOTE — PROGRESS NOTES
[facility]    Progress Note - Medicine Service, [facility] TEAM 3       Date of Admission:  3/17/2024    Assessment & Plan   Tessa Mansfield is a 89 year old female with pmh of htn, CAD and MI s/p stent placements, sinus node dysfunction s/p dual chamber pacemaker placement, afib, and stage 4 CKD presenting on 3/17/2024 with acute falls and concern for acute pyelonephritis.    Today 3/18:   - SHAJI worsening- will give 500cc LR  - Persistent leukocytosis- getting CT AP w/o contrast to evaluate for pyelo/source control  - continue ceftriaxone  - await E. coli sensitivities  - PVR ordered    # Sepsis 2/2 urinary tract infection (suspected pyelonephritis) with E. Coli bacteremia- improving  One month of incontinence and a few weeks of dysuria with occasional suprapubic tenderness, none on exam today. Found to have Leukocytosis with neutrophilic predominance. Elevated inflammatory markers with procal at 13 and CRP at 41. Bilateral renal ultrasound without hydronephrosis but urinary bladder debris present. +CVA tenderness on exam on admission. With evidence of UTI and +CVA tenderness, dx of atypical presentation of uncomplicated pyelonephritis is most likely, possibly exacerbated by dehydration or urinary retention.  - Micro:    - 3/17 blood cultures x2- 3 of 4 with E. coli   - 3/17 urine culture- E. coli   - 3/17 COVID/flu/RSV- negative  - Antibiotics:    - ceftriaxone (3/17 - P)  - Post void scan ordered to assess for retention  - PRN acetaminophen 650 for pain/fever    #Acute syncope with falls  #Weakness  No prior history of falls, but some baseline instability with peripheral neuropathy. Two falls on 3/16 without loss of consciousness, lightheadedness, or dizziness. Family was able to assist the patient with getting up. Per family, patient had decreased blood pressure after falls. 500 mL NS bolus in ED, EKG without concern for acute cardiac pathology and troponin stable at 34.  CK normal. Differential includes most likely orthostatic hypotension given fluctuations in blood pressure. Additional strong consideration for infection given diagnostics discussed below. Considered broad differential including toxin-mediated, seizure, new cardiac pathology, and spinal cord abscess, however, highly unlikely given history, PE, and lab information.  - CT head negative for acute intracranial pathology  - Normal B12 and folate labs  - PT/OT    #Mild anion gap metabolic acidosis, resolved  Anion gap of 16. Lactate normal, elevated blood ketones. Possible due to infection, dehydration, or decreased oral intake.  - Monitor on CMP  - Fluids as needed    #Mild SHAJI vs CKD progression  #Hypokalemia  #Stage IV CKD with proteinuria  Follows with Dr. Martin at [facility]. Baseline creatinine 1.8-2.2 per documentation. Creatinine at 2.69 on admission, concern for mild SHAJI 2/2 infection or dehydration. 500 mL NS in ED.  - Fluid bolus as needed. Trend creatinine.  - Continue potassium chloride 10 meq BID - electrolyte replacement protocol  ________________________________  Chronic/stable:    #Essential hypertension  Typical systolic BPs in 130s per patient. Systolic pressures in 110s on presentation to ED, now stable in 130s. Holding anti-hypertensive medications in the setting of acute illness  - Hold PTA Amlodipine 10 mg daily  - Hold PTA Furosemide 40 mg BID  - Hold PTA Hydralazine 10  mg TID  - Hold PTA Isosorbide mononitrate 40 mg TID  - Hold PTA Metoprolol tartrate 100 mg BID    #CAD  #History of MI  #Sinus node dysfunction s/p pacemaker placement  #Paroxysmal atrial fibrillation  #Thrombocytopenia due to medication  Follows with Dr. Santoyo and Dr. Zhu at [facility]. Hx of CAD and MI with eight stents placed in 2004. Additional hx of sinus node dysfunction s/p dual chamber medtronic pacemaker placed in 2007. Diagnosis of atrial fibrillation in 2023. Inpt EKG without concern for acute  cardiac pathology and troponin stable at 34. BNP 2000+ with history convincing for CHF. Platelets in 60s, thrombocytopenia previously documented in notes.  - Continue Apixaban 2.5 mg BID  - Continue Clopidogrel 75 mg daily  - Continue Rosuvastatin 20 mg daily    #Dyspnea on exertion  #Chronic cough  - Continue PTA Budesonide 0.5 mg/2mL  - Provided Breo Ellipta 100-25 mcg interchanged for PTA Advair 250-50 mcg daily    #Idiopathic gout  - Hold PTA Allopurinol 100 mg daily    #Chronic anemia  - Continue Ferrous sulfate 325 mg    #GERD  - Provided pantoprazole 40 mg BID interchanged for PTA Omeprazole 40 mg daily  - PRN tums    #Glaucoma  - Continue PTA Timolol ophthalmic solution    #Vitamin supplementation  - Continue PTA Vitamin B12 1000 mcg  - Continue PTA Vitamin D3 25 mcg every other day    #History of malignancy  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    #Chronic diarrhea  Several months of diarrhea at baseline, stable.          Diet: Combination Diet Low Saturated Fat Na <2400mg Diet, No Caffeine Diet    DVT Prophylaxis: DOAC and Pneumatic Compression Devices  Bonner Catheter: Not present  Fluids: Bolus as needed  Lines: None     Cardiac Monitoring: None  Code Status: Full Code      Clinically Significant Risk Factors        # Hypokalemia: Lowest K = 3.2 mmol/L in last 2 days, will replace as needed   # Hypocalcemia: Lowest Ca = 8.3 mg/dL in last 2 days, will monitor and replace as appropriate     # Hypoalbuminemia: Lowest albumin = 3.4 g/dL at 3/18/2024  5:30 AM, will monitor as appropriate     # Thrombocytopenia: Lowest platelets = 57 in last 2 days, will monitor for bleeding  # Acute Kidney Injury, unspecified: based on a >150% or 0.3 mg/dL increase in last creatinine compared to past 90 day average, will monitor renal function  # Hypertension: Noted on problem list  # Chronic heart  failure with preserved ejection fraction: heart failure noted on problem list and last echo with EF >50%            # Pacemaker present       Disposition Plan     Expected Discharge Date: 03/19/2024                The patient's care was discussed with the Attending Physician, Dr. De Jesus and Patient.    Erik Sanchez MD  IM PGY3  Medicine Service, [facility] TEAM 3  [facility]  Securely message with [employer] (more info)  Text page via [facility] Paging/Directory   See signed in provider for up to date coverage information  ______________________________________________________________________    Interval History   NAEON. Feeling better this morning compare to yesterday. No chest pain, shortness of breath, abdominal pain, nausea, vomiting, or dysuria. No more back pain. Feels that she is improving. She has been eating and drinking.     Physical Exam   Vital Signs: Temp: 99.2  F (37.3  C) Temp src: Oral BP: 136/58 Pulse: 65   Resp: 20 SpO2: 96 % O2 Device: None (Room air)    Weight: 156 lbs 12.8 oz    General: lying in bed, NAD, appears comfortable, not diaphoretic  HEENT: no scleral icterus or conjunctival injection, oropharynx clear  Resp: clear to auscultation bilaterally, no crackles or wheezes  Cardiac: regular rate and rhythm, no murmurs  Abdomen: soft, non-tender, non-distended. No rebound or guarding.   Extremities: warm, no lower extremity edema  Neuro: alert, answers questions appropriately  Psych: appropriate mood and affect    Medical Decision Making       Please see A&P for additional details of medical decision making.      Data     I have personally reviewed the following data over the past 24 hrs:    21.9 (H)  \   8.0 (L)   / 57 (L)     142 111 (H) 51.5 (H) /  94   3.9 18 (L) 3.02 (H) \     ALT: 22 AST: 41 AP: 93 TBILI: 0.2   ALB: 3.4 (L) TOT PROTEIN: 6.1 (L) LIPASE: N/A     Procal: N/A CRP: N/A Lactic Acid: 1.4

## 2024-03-18 NOTE — PLAN OF CARE
Goal Outcome Evaluation:      Plan of Care Reviewed With: patient    Overall Patient Progress: no changeOverall Patient Progress: no change    Outcome Evaluation: Continue with POC    No acute changes overnight. Aox4, VSS on RA. Up SBA. Given PRN tylenol for back pain 1x. L PIV saline locked. Call light in reach and able to make needs known

## 2024-03-18 NOTE — PLAN OF CARE
Goal Outcome Evaluation:      Plan of Care Reviewed With: patient    Overall Patient Progress: no changeOverall Patient Progress: no change    Outcome Evaluation: CMA done for ERS no needs anticipated for discharge    CAMACHO Rizvi, SW  [facility]   Covering 6B SW  Ph: [phone]

## 2024-03-18 NOTE — PLAN OF CARE
Goal Outcome Evaluation:  Plan of Care Reviewed With: patient  Overall Patient Progress: no change  Outcome Evaluation:     Assumed Cares 8991-7116: LR bolus given. PVR residual (45minutes after void) = 150cc. Good appetite. Up SBA. A+Ox4. Denies pain/nausea.     VSS on RA except HTN  Temp: 98.7  F (37.1  C)    BP: (!) 141/60    Pulse: 53    Resp: 14    SpO2: 94 %    O2 Device: None (Room air)

## 2024-03-19 ENCOUNTER — APPOINTMENT (OUTPATIENT)
Dept: PHYSICAL THERAPY | Facility: CLINIC | Age: 89
DRG: 872 | End: 2024-03-19
Payer: COMMERCIAL

## 2024-03-19 LAB
ANION GAP SERPL CALCULATED.3IONS-SCNC: 16 MMOL/L (ref 7–15)
BACTERIA BLD CULT: ABNORMAL
BACTERIA BLD CULT: ABNORMAL
BACTERIA UR CULT: ABNORMAL
BASOPHILS # BLD AUTO: ABNORMAL 10*3/UL
BASOPHILS # BLD MANUAL: 0.1 10E3/UL (ref 0–0.2)
BASOPHILS NFR BLD AUTO: ABNORMAL %
BASOPHILS NFR BLD MANUAL: 1 %
BUN SERPL-MCNC: 41.9 MG/DL (ref 8–23)
BURR CELLS BLD QL SMEAR: SLIGHT
CALCIUM SERPL-MCNC: 9.2 MG/DL (ref 8.8–10.2)
CHLORIDE SERPL-SCNC: 113 MMOL/L (ref 98–107)
CREAT SERPL-MCNC: 2.31 MG/DL (ref 0.51–0.95)
DEPRECATED HCO3 PLAS-SCNC: 17 MMOL/L (ref 22–29)
EGFRCR SERPLBLD CKD-EPI 2021: 20 ML/MIN/1.73M2
EOSINOPHIL # BLD AUTO: ABNORMAL 10*3/UL
EOSINOPHIL # BLD MANUAL: 0 10E3/UL (ref 0–0.7)
EOSINOPHIL NFR BLD AUTO: ABNORMAL %
EOSINOPHIL NFR BLD MANUAL: 0 %
ERYTHROCYTE [DISTWIDTH] IN BLOOD BY AUTOMATED COUNT: 16.1 % (ref 10–15)
GLUCOSE SERPL-MCNC: 95 MG/DL (ref 70–99)
HCT VFR BLD AUTO: 28.1 % (ref 35–47)
HGB BLD-MCNC: 8.4 G/DL (ref 11.7–15.7)
HOLD SPECIMEN: NORMAL
IMM GRANULOCYTES # BLD: ABNORMAL 10*3/UL
IMM GRANULOCYTES NFR BLD: ABNORMAL %
LACTATE SERPL-SCNC: 0.9 MMOL/L (ref 0.7–2)
LYMPHOCYTES # BLD AUTO: ABNORMAL 10*3/UL
LYMPHOCYTES # BLD MANUAL: 2 10E3/UL (ref 0.8–5.3)
LYMPHOCYTES NFR BLD AUTO: ABNORMAL %
LYMPHOCYTES NFR BLD MANUAL: 16 %
MAGNESIUM SERPL-MCNC: 2 MG/DL (ref 1.7–2.3)
MCH RBC QN AUTO: 27.6 PG (ref 26.5–33)
MCHC RBC AUTO-ENTMCNC: 29.9 G/DL (ref 31.5–36.5)
MCV RBC AUTO: 92 FL (ref 78–100)
MONOCYTES # BLD AUTO: ABNORMAL 10*3/UL
MONOCYTES # BLD MANUAL: 2.4 10E3/UL (ref 0–1.3)
MONOCYTES NFR BLD AUTO: ABNORMAL %
MONOCYTES NFR BLD MANUAL: 19 %
NEUTROPHILS # BLD AUTO: ABNORMAL 10*3/UL
NEUTROPHILS # BLD MANUAL: 8.1 10E3/UL (ref 1.6–8.3)
NEUTROPHILS NFR BLD AUTO: ABNORMAL %
NEUTROPHILS NFR BLD MANUAL: 64 %
NRBC # BLD AUTO: 0 10E3/UL
NRBC BLD AUTO-RTO: 0 /100
PHOSPHATE SERPL-MCNC: 3.4 MG/DL (ref 2.5–4.5)
PLAT MORPH BLD: ABNORMAL
PLATELET # BLD AUTO: 62 10E3/UL (ref 150–450)
POLYCHROMASIA BLD QL SMEAR: SLIGHT
POTASSIUM SERPL-SCNC: 3.8 MMOL/L (ref 3.4–5.3)
RBC # BLD AUTO: 3.04 10E6/UL (ref 3.8–5.2)
RBC MORPH BLD: ABNORMAL
SODIUM SERPL-SCNC: 146 MMOL/L (ref 135–145)
WBC # BLD AUTO: 12.7 10E3/UL (ref 4–11)

## 2024-03-19 PROCEDURE — 120N000002 HC R&B MED SURG/OB UMMC

## 2024-03-19 PROCEDURE — 36415 COLL VENOUS BLD VENIPUNCTURE: CPT | Performed by: HOSPITALIST

## 2024-03-19 PROCEDURE — 250N000013 HC RX MED GY IP 250 OP 250 PS 637

## 2024-03-19 PROCEDURE — 250N000009 HC RX 250

## 2024-03-19 PROCEDURE — 85027 COMPLETE CBC AUTOMATED: CPT

## 2024-03-19 PROCEDURE — 36415 COLL VENOUS BLD VENIPUNCTURE: CPT

## 2024-03-19 PROCEDURE — 250N000013 HC RX MED GY IP 250 OP 250 PS 637: Performed by: HOSPITALIST

## 2024-03-19 PROCEDURE — 97530 THERAPEUTIC ACTIVITIES: CPT | Mod: GP

## 2024-03-19 PROCEDURE — 83735 ASSAY OF MAGNESIUM: CPT

## 2024-03-19 PROCEDURE — 83605 ASSAY OF LACTIC ACID: CPT | Performed by: HOSPITALIST

## 2024-03-19 PROCEDURE — 80048 BASIC METABOLIC PNL TOTAL CA: CPT

## 2024-03-19 PROCEDURE — 99233 SBSQ HOSP IP/OBS HIGH 50: CPT | Mod: GC | Performed by: INTERNAL MEDICINE

## 2024-03-19 PROCEDURE — 84100 ASSAY OF PHOSPHORUS: CPT

## 2024-03-19 PROCEDURE — 85007 BL SMEAR W/DIFF WBC COUNT: CPT

## 2024-03-19 PROCEDURE — 258N000003 HC RX IP 258 OP 636

## 2024-03-19 PROCEDURE — 250N000011 HC RX IP 250 OP 636

## 2024-03-19 RX ORDER — CIPROFLOXACIN 500 MG/1
500 TABLET, FILM COATED ORAL
Status: DISCONTINUED | OUTPATIENT
Start: 2024-03-20 | End: 2024-03-21 | Stop reason: HOSPADM

## 2024-03-19 RX ORDER — DEXTROSE MONOHYDRATE 50 MG/ML
INJECTION, SOLUTION INTRAVENOUS CONTINUOUS
Status: DISPENSED | OUTPATIENT
Start: 2024-03-19 | End: 2024-03-19

## 2024-03-19 RX ORDER — ROSUVASTATIN CALCIUM 10 MG/1
10 TABLET, COATED ORAL DAILY
Status: DISCONTINUED | OUTPATIENT
Start: 2024-03-19 | End: 2024-03-21 | Stop reason: HOSPADM

## 2024-03-19 RX ORDER — ALLOPURINOL 100 MG/1
100 TABLET ORAL DAILY
Status: DISCONTINUED | OUTPATIENT
Start: 2024-03-19 | End: 2024-03-21 | Stop reason: HOSPADM

## 2024-03-19 RX ORDER — LIDOCAINE HYDROCHLORIDE 20 MG/ML
JELLY TOPICAL EVERY 4 HOURS PRN
Status: DISCONTINUED | OUTPATIENT
Start: 2024-03-19 | End: 2024-03-19

## 2024-03-19 RX ORDER — CIPROFLOXACIN 500 MG/1
500 TABLET, FILM COATED ORAL
Status: DISCONTINUED | OUTPATIENT
Start: 2024-03-20 | End: 2024-03-19

## 2024-03-19 RX ORDER — METOPROLOL TARTRATE 50 MG
50 TABLET ORAL 2 TIMES DAILY
Status: DISCONTINUED | OUTPATIENT
Start: 2024-03-19 | End: 2024-03-21 | Stop reason: HOSPADM

## 2024-03-19 RX ADMIN — METOPROLOL TARTRATE 50 MG: 50 TABLET, FILM COATED ORAL at 20:32

## 2024-03-19 RX ADMIN — PANTOPRAZOLE SODIUM 40 MG: 40 TABLET, DELAYED RELEASE ORAL at 20:32

## 2024-03-19 RX ADMIN — APIXABAN 2.5 MG: 2.5 TABLET, FILM COATED ORAL at 20:32

## 2024-03-19 RX ADMIN — CLOPIDOGREL BISULFATE 75 MG: 75 TABLET ORAL at 09:31

## 2024-03-19 RX ADMIN — METOPROLOL TARTRATE 100 MG: 50 TABLET, FILM COATED ORAL at 09:31

## 2024-03-19 RX ADMIN — ACETAMINOPHEN 650 MG: 325 TABLET, FILM COATED ORAL at 10:01

## 2024-03-19 RX ADMIN — ACETAMINOPHEN 650 MG: 325 TABLET, FILM COATED ORAL at 21:48

## 2024-03-19 RX ADMIN — POTASSIUM CHLORIDE 10 MEQ: 750 TABLET, EXTENDED RELEASE ORAL at 20:32

## 2024-03-19 RX ADMIN — POTASSIUM CHLORIDE 10 MEQ: 750 TABLET, EXTENDED RELEASE ORAL at 09:31

## 2024-03-19 RX ADMIN — ALLOPURINOL 100 MG: 100 TABLET ORAL at 09:31

## 2024-03-19 RX ADMIN — ISOSORBIDE MONONITRATE 40 MG: 20 TABLET ORAL at 20:31

## 2024-03-19 RX ADMIN — ACETAMINOPHEN 650 MG: 325 TABLET, FILM COATED ORAL at 05:17

## 2024-03-19 RX ADMIN — CALCIUM 500 MG: 500 TABLET ORAL at 09:31

## 2024-03-19 RX ADMIN — PANTOPRAZOLE SODIUM 40 MG: 40 TABLET, DELAYED RELEASE ORAL at 09:31

## 2024-03-19 RX ADMIN — FLUTICASONE FUROATE AND VILANTEROL TRIFENATATE 1 PUFF: 100; 25 POWDER RESPIRATORY (INHALATION) at 09:29

## 2024-03-19 RX ADMIN — Medication 25 MCG: at 09:30

## 2024-03-19 RX ADMIN — ISOSORBIDE MONONITRATE 40 MG: 20 TABLET ORAL at 09:29

## 2024-03-19 RX ADMIN — ROSUVASTATIN CALCIUM 10 MG: 10 TABLET, FILM COATED ORAL at 09:41

## 2024-03-19 RX ADMIN — APIXABAN 2.5 MG: 2.5 TABLET, FILM COATED ORAL at 09:30

## 2024-03-19 RX ADMIN — DEXTROSE MONOHYDRATE: 50 INJECTION, SOLUTION INTRAVENOUS at 09:28

## 2024-03-19 RX ADMIN — ISOSORBIDE MONONITRATE 40 MG: 20 TABLET ORAL at 14:33

## 2024-03-19 RX ADMIN — CEFTRIAXONE SODIUM 2 G: 2 INJECTION, POWDER, FOR SOLUTION INTRAMUSCULAR; INTRAVENOUS at 05:02

## 2024-03-19 RX ADMIN — CYANOCOBALAMIN TAB 500 MCG 500 MCG: 500 TAB at 09:31

## 2024-03-19 RX ADMIN — TIMOLOL MALEATE 1 DROP: 5 SOLUTION/ DROPS OPHTHALMIC at 09:29

## 2024-03-19 ASSESSMENT — ACTIVITIES OF DAILY LIVING (ADL)
ADLS_ACUITY_SCORE: 35
ADLS_ACUITY_SCORE: 36
ADLS_ACUITY_SCORE: 36
ADLS_ACUITY_SCORE: 35
ADLS_ACUITY_SCORE: 35
ADLS_ACUITY_SCORE: 36
ADLS_ACUITY_SCORE: 36
ADLS_ACUITY_SCORE: 35
ADLS_ACUITY_SCORE: 36
ADLS_ACUITY_SCORE: 36
ADLS_ACUITY_SCORE: 35
ADLS_ACUITY_SCORE: 36
ADLS_ACUITY_SCORE: 36
ADLS_ACUITY_SCORE: 35
ADLS_ACUITY_SCORE: 36
ADLS_ACUITY_SCORE: 35
ADLS_ACUITY_SCORE: 36

## 2024-03-19 NOTE — PROGRESS NOTES
Resident/Fellow Attestation   I, Erik Sanchez MD, was present with the medical/NAPOLEON student who participated in the service and in the documentation of the note.  I have verified the history and personally performed the physical exam and medical decision making.  I agree with the assessment and plan of care as documented in the note.      Clinically doing well. E. coli sensitive to fluoroquinolones and Bactrim. She does have allergies to this medications listed- she does not remember the exact reaction but she says she has never had anaphylaxis. She is agreeable to start Cipro while inpatient and we will monitor the response. Plan to start Cipro tomorrow. Plan for 7 days total of antibiotics for Gram negative bacteremia given she had clinical improvement quickly. Also giving D5W for hypernatremia. Resumed metoprolol tartrate and isosorbide mononitrate.     Erik Sanchez MD  PGY3  Date of Service (when I saw the patient): 03/19/24     [facility]    Progress Note - Medicine Service, [facility] TEAM 3       Date of Admission:  3/17/2024    Assessment & Plan   Tessa Mansfield is a 89 year old female with pmh of htn, CAD and MI s/p stent placements, sinus node dysfunction s/p dual chamber pacemaker placement, afib, and stage 4 CKD presenting on 3/17/2024 with acute falls and concern for acute pyelonephritis.    Today 3/19:   - SHAJI improving  - Slight hypernatremia likely due to dehydration- provided D5 infusion 75 mL/hr over 12h  - Continue IV ceftriaxone today (3/17-3/19)  - Restarted metoprolol at 50 mg BID, titrate up to 100 mg BID as tolerated  - Restarted allopurinol and Imdur at home doses  - Start PO ciprofloxacin 500 mg daily tomorrow (3/20-3/23) - monitor any reactions, continue if tolerated    # Sepsis 2/2 urinary tract infection (suspected pyelonephritis) with E. Coli bacteremia - improving  One month of incontinence and a few weeks of dysuria with occasional  suprapubic tenderness, none on admission exam. Found to have Leukocytosis with neutrophilic predominance. Elevated inflammatory markers with procal at 13 and CRP at 41. Bilateral renal ultrasound without hydronephrosis but urinary bladder debris present. +CVA tenderness on exam on admission. PRV was 150 ml. With evidence of UTI and +CVA tenderness, dx of atypical presentation of uncomplicated pyelonephritis is most likely, possibly exacerbated by dehydration. Chart documentation of allergy to ciprofloxacin without anaphylactic reaction, patient unable to recall reaction. Will trial tomorrow inpatient and continue if tolerated.  - Micro:    - 3/17 blood cultures x2- 3 of 4 with E. coli   - 3/17 urine culture- E. coli   - 3/17 COVID/flu/RSV- negative   - 3/18 E. Coli sensitive to all meds but indeterminate ampicillin  - Antibiotics:    - ceftriaxone (3/17 - 3/19)  - ciprofloxacin 500 mg q24 tomorrow (3/20 - 3/23) for bacteremia - monitor tolerance  - PRV on 3/18 150 mL  - PRN acetaminophen 650 for pain/fever    #Acute syncope with falls  #Weakness  No prior history of falls, but some baseline instability with peripheral neuropathy. Two falls on 3/16 without loss of consciousness, lightheadedness, or dizziness. Family was able to assist the patient with getting up. Per family, patient had decreased blood pressure after falls. 500 mL NS bolus in ED, EKG without concern for acute cardiac pathology and troponin stable at 34. CK normal. Differential includes most likely orthostatic hypotension given fluctuations in blood pressure. Additional strong consideration for infection given diagnostics discussed below. Considered broad differential including toxin-mediated, seizure, new cardiac pathology, and spinal cord abscess, however, highly unlikely given history, PE, and lab information.  - CT head negative for acute intracranial pathology  - Normal B12 and folate labs  - PT/OT    #Mild anion gap metabolic acidosis,  resolved  Anion gap of 16. Lactate normal, elevated blood ketones. Possible due to infection, dehydration, or decreased oral intake.  - Monitor on CMP  - Fluids as needed    #Mild SHAJI vs CKD progression  #Hypokalemia  #Stage IV CKD with proteinuria  Follows with Dr. Martin at [facility]. Baseline creatinine 1.8-2.2 per documentation. Creatinine at 2.69 on admission, concern for mild SHAJI 2/2 infection or dehydration. 500 mL NS in ED and bolus of LR on 3/18. Cr now downtrending.   - Fluid bolus as needed. Trend creatinine.  - Continue potassium chloride 10 meq BID - electrolyte replacement protocol  ________________________________  Chronic/stable:    #Essential hypertension  Typical systolic BPs in 130s per patient. Systolic pressures in 110s on presentation to ED, now stable in 130s. Held anti-hypertensive medications in the setting of acute illness. Restarting Imdur and metoprolol on 3/19 with some dizziness, will introduce lower metoprolol dose and increase as tolerated.  - Hold PTA Amlodipine 10 mg daily  - Hold PTA Furosemide 40 mg BID  - Hold PTA Hydralazine 10  mg TID  - Restart PTA Isosorbide mononitrate 40 mg TID  - Restart PTA Metoprolol tartrate on 3/19 but at 50 mg BID, will increase to 100 mg BID as tolerated    #CAD  #History of MI  #Sinus node dysfunction s/p pacemaker placement  #Paroxysmal atrial fibrillation  #CHF with preserved ejection fraction  #Thrombocytopenia due to medication  Follows with Dr. Santoyo and Dr. Zhu at [facility]. Hx of CAD and MI with eight stents placed in 2004. Additional hx of sinus node dysfunction s/p dual chamber medtronic pacemaker placed in 2007. Diagnosis of atrial fibrillation in 2023. Inpt EKG without concern for acute cardiac pathology and troponin stable at 34. BNP 2000+ with history convincing for CHF. Platelets in 60s, thrombocytopenia previously documented in notes.  - Continue Apixaban 2.5 mg BID  - Continue Clopidogrel 75 mg daily  -  Continue Rosuvastatin 20 mg daily    #Dyspnea on exertion  #Chronic cough  - Continue PTA Budesonide 0.5 mg/2mL  - Provided Breo Ellipta 100-25 mcg interchanged for PTA Advair 250-50 mcg daily    #Idiopathic gout  - Restart PTA Allopurinol 100 mg daily    #Chronic anemia  - Continue Ferrous sulfate 325 mg    #GERD  - Provided pantoprazole 40 mg BID interchanged for PTA Omeprazole 40 mg daily  - PRN tums    #Glaucoma  - Continue PTA Timolol ophthalmic solution    #Vitamin supplementation  - Continue PTA Vitamin B12 1000 mcg  - Continue PTA Vitamin D3 25 mcg every other day    #History of malignancy  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    #Chronic diarrhea  Several months of diarrhea at baseline, stable.          Diet: Combination Diet Low Saturated Fat Na <2400mg Diet, No Caffeine Diet    DVT Prophylaxis: DOAC and Pneumatic Compression Devices  Bonner Catheter: Not present  Fluids: D5W 75 mL/hr over 12 hours  Lines: None     Cardiac Monitoring: None  Code Status: Full Code      Clinically Significant Risk Factors         # Hypernatremia: Highest Na = 146 mmol/L in last 2 days, will monitor as appropriate  # Hypocalcemia: Lowest Ca = 8.3 mg/dL in last 2 days, will monitor and replace as appropriate     # Hypoalbuminemia: Lowest albumin = 3.4 g/dL at 3/18/2024  5:30 AM, will monitor as appropriate     # Thrombocytopenia: Lowest platelets = 57 in last 2 days, will monitor for bleeding   # Hypertension: Noted on problem list  # Chronic heart failure with preserved ejection fraction: heart failure noted on problem list and last echo with EF >50%           # Financial/Environmental Concerns: none   # Pacemaker present       Disposition Plan      Expected Discharge Date: 03/19/2024      Destination: home with family          The patient's care was discussed with the Attending Physician, Dr. Alaniz  and Patient.    Jamila Scanlon  MS3  Medicine Service, [facility] TEAM [address] [place] [address]  Securely message with [employer] (more info)  Text page via [facility] Paging/Directory   See signed in provider for up to date coverage information  ______________________________________________________________________    Interval History   NAEON. Not feeling well this morning due to high blood pressure. Denies headache, vomiting, vision changes. Would like to restart hypertensive meds. Planning to go for a walk today. States she did have prior reaction to cipro but does not remember what it was, denies any anaphylactic reaction throughout lifetime.    Physical Exam   Vital Signs: Temp: 97.4  F (36.3  C) Temp src: Oral BP: 98/62 Pulse: 98   Resp: 18 SpO2: 97 % O2 Device: None (Room air)    Weight: 156 lbs 12.8 oz    General: lying in bed, NAD, appears comfortable, not diaphoretic  HEENT: no scleral icterus or conjunctival injection, oropharynx clear  Resp: clear to auscultation bilaterally, no crackles or wheezes  Cardiac: Normal rate but irregular rhythm. Soft systolic murmur heard in left upper sternal border.  Abdomen: soft, non-tender, non-distended. No rebound or guarding.   Extremities: warm, lower extm seem moderately swollen but no erythema or tenderness to palpation of lower legs  Neuro: alert, answers questions appropriately  Psych: appropriate mood and affect  Skin: Black, scabbed areas of skin on tip of nose and posterior-lateral right neck. Multiple skin tags on chest. Several macules of seborrheic keratosis on abdomen. Diffuse bruising on bilateral lower extremities. No rash noted.    Medical Decision Making       Please see A&P for additional details of medical decision making.      Data     I have personally reviewed the following data over the past 24 hrs:    12.7 (H)  \   8.4 (L)   / 62 (L)     146 (H) 113 (H) 41.9 (H) /  95   3.8 17 (L) 2.31 (H) \     Procal: N/A CRP: N/A Lactic  Acid: 0.9

## 2024-03-19 NOTE — PROVIDER NOTIFICATION
Paged Dr. Polanco    Pt's /91, HR is . Asymptomatic. Pt is asking if we can re start home meds for HTN.     MD will review PTA meds.

## 2024-03-19 NOTE — PLAN OF CARE
Goal Outcome Evaluation:      Plan of Care Reviewed With: patient    Overall Patient Progress: improvingOverall Patient Progress: improving    Outcome Evaluation: patient to start oral abx tomorrow am.  good appetite. hat back in toilet to measure output did not put back in until 10. patient was up indpendently but after blood pressure medications were unheld and given bp dropped and patient dizzy. iv fluids started. later this afternoon patient states she feels better but is somewhat dizzy at times. patient is up with sba due to being dizzy

## 2024-03-19 NOTE — PLAN OF CARE
Goal Outcome Evaluation:      Plan of Care Reviewed With: patient    Overall Patient Progress: improvingOverall Patient Progress: improving         Pt admitted on 3/17 for falls, UTI and pyelonephritis. Pt A&Ox4, up ad court. Pt denied any pain. Pt set off the septic protocol and lactic was 1.1. Pt rested comfortably in her room this evening. Possible discharge home tomorrow. Continue with plan of care.

## 2024-03-19 NOTE — PROVIDER NOTIFICATION
Notified dr. Sanchez regarding 30 min after bp and heart meds unheld and given her bp dropped and became dizzy.

## 2024-03-19 NOTE — CONSULTS
"SPIRITUAL HEALTH SERVICES Consult Note  [facility] 7C    Referral Source/Reason for Visit: Routine consult    Summary-   ~Tessa is hopeful to go home tomorrow and to \"get back to myself.\"    Plan:  services remain available upon request. Currently, there are no plans to follow up, unless requested or if Tessa's stay extends.    Rev. MALICK Burton.  Chaplain Resident  Pager [phone]    * [facility] remains available 24/7 for emergent requests/referrals, either by having the switchboard page the on-call  or by entering an ASAP/STAT consult in Epic (this will also page the on-call ). Routine Epic consults receive an initial response within 24 hours.*    Assessment      Patient/Family Understanding of Illness and Goals of Care -   ~Tessa had a bladder infection that spread and then she fell at home. Tessa is hopeful to go home tomorrow on a pill form medication that will treat the infection.      Distress and Loss -   ~Tessa is missing being around her family. Along with the freedom and comfort of home.    ~Tessa is sad to be absent from attending Mass ([employer]) with her  during this lenten season.       Strengths, Coping, and Resources -   ~Tessa's family, , kids, grandkids, and great-grandkids are sources of fantasma and strength.       Meaning, Beliefs, and Spirituality -    ~Tessa is [employer]. Mass and prayer is meaningful and enriching to her life.     "

## 2024-03-19 NOTE — PLAN OF CARE
Goal Outcome Evaluation: [id number]      Plan of Care Reviewed With: patient    Overall Patient Progress: improving    Neuros:  A&Ox4, denies dizziness, n/v, tingling or numbness.   Vitals: Vitally stable, Afebrile  Pain: lower back pain. Received tylenol with relief.   Resp: Lung sounds clear. Has SOB on exertion. Stable on room air.   GI: Last BM 3/18  : Voiding spontaneously  Cardiovascular: WNL  Lines/Daines: 1 PIV/SL  Skin: bruising on arms and legs, scab on nose and has steri strips behind R ear from skin cancer procedure.    Diet: Low fat and low Na   Activity: up ad court   Recommendations/Plan: Pain management, cont.with ABX, and possible discharge to home today.

## 2024-03-20 ENCOUNTER — APPOINTMENT (OUTPATIENT)
Dept: PHYSICAL THERAPY | Facility: CLINIC | Age: 89
DRG: 872 | End: 2024-03-20
Payer: COMMERCIAL

## 2024-03-20 LAB
ANION GAP SERPL CALCULATED.3IONS-SCNC: 15 MMOL/L (ref 7–15)
BACTERIA BLD CULT: ABNORMAL
BACTERIA BLD CULT: ABNORMAL
BASE EXCESS BLDV CALC-SCNC: -4.8 MMOL/L (ref -3–3)
BASOPHILS # BLD AUTO: ABNORMAL 10*3/UL
BASOPHILS # BLD MANUAL: 0 10E3/UL (ref 0–0.2)
BASOPHILS NFR BLD AUTO: ABNORMAL %
BASOPHILS NFR BLD MANUAL: 0 %
BUN SERPL-MCNC: 36.9 MG/DL (ref 8–23)
CALCIUM SERPL-MCNC: 9.2 MG/DL (ref 8.8–10.2)
CHLORIDE SERPL-SCNC: 111 MMOL/L (ref 98–107)
CREAT SERPL-MCNC: 2.27 MG/DL (ref 0.51–0.95)
DEPRECATED HCO3 PLAS-SCNC: 16 MMOL/L (ref 22–29)
EGFRCR SERPLBLD CKD-EPI 2021: 20 ML/MIN/1.73M2
EOSINOPHIL # BLD AUTO: ABNORMAL 10*3/UL
EOSINOPHIL # BLD MANUAL: 0 10E3/UL (ref 0–0.7)
EOSINOPHIL NFR BLD AUTO: ABNORMAL %
EOSINOPHIL NFR BLD MANUAL: 0 %
ERYTHROCYTE [DISTWIDTH] IN BLOOD BY AUTOMATED COUNT: 15.9 % (ref 10–15)
GLUCOSE SERPL-MCNC: 90 MG/DL (ref 70–99)
HCO3 BLDV-SCNC: 20 MMOL/L (ref 21–28)
HCT VFR BLD AUTO: 27.6 % (ref 35–47)
HGB BLD-MCNC: 8.2 G/DL (ref 11.7–15.7)
IMM GRANULOCYTES # BLD: ABNORMAL 10*3/UL
IMM GRANULOCYTES NFR BLD: ABNORMAL %
LYMPHOCYTES # BLD AUTO: ABNORMAL 10*3/UL
LYMPHOCYTES # BLD MANUAL: 2.3 10E3/UL (ref 0.8–5.3)
LYMPHOCYTES NFR BLD AUTO: ABNORMAL %
LYMPHOCYTES NFR BLD MANUAL: 26 %
MAGNESIUM SERPL-MCNC: 2 MG/DL (ref 1.7–2.3)
MCH RBC QN AUTO: 27.2 PG (ref 26.5–33)
MCHC RBC AUTO-ENTMCNC: 29.7 G/DL (ref 31.5–36.5)
MCV RBC AUTO: 92 FL (ref 78–100)
MONOCYTES # BLD AUTO: ABNORMAL 10*3/UL
MONOCYTES # BLD MANUAL: 0.8 10E3/UL (ref 0–1.3)
MONOCYTES NFR BLD AUTO: ABNORMAL %
MONOCYTES NFR BLD MANUAL: 9 %
NEUTROPHILS # BLD AUTO: ABNORMAL 10*3/UL
NEUTROPHILS # BLD MANUAL: 5.7 10E3/UL (ref 1.6–8.3)
NEUTROPHILS NFR BLD AUTO: ABNORMAL %
NEUTROPHILS NFR BLD MANUAL: 65 %
NRBC # BLD AUTO: 0 10E3/UL
NRBC BLD AUTO-RTO: 0 /100
O2/TOTAL GAS SETTING VFR VENT: 0 %
OXYHGB MFR BLDV: 69 % (ref 70–75)
PCO2 BLDV: 33 MM HG (ref 40–50)
PH BLDV: 7.38 [PH] (ref 7.32–7.43)
PHOSPHATE SERPL-MCNC: 3.5 MG/DL (ref 2.5–4.5)
PLAT MORPH BLD: NORMAL
PLATELET # BLD AUTO: 87 10E3/UL (ref 150–450)
PO2 BLDV: 39 MM HG (ref 25–47)
POTASSIUM SERPL-SCNC: 4.1 MMOL/L (ref 3.4–5.3)
RBC # BLD AUTO: 3.01 10E6/UL (ref 3.8–5.2)
RBC MORPH BLD: NORMAL
SAO2 % BLDV: 69.6 % (ref 70–75)
SODIUM SERPL-SCNC: 142 MMOL/L (ref 135–145)
WBC # BLD AUTO: 8.7 10E3/UL (ref 4–11)

## 2024-03-20 PROCEDURE — 83735 ASSAY OF MAGNESIUM: CPT

## 2024-03-20 PROCEDURE — 120N000002 HC R&B MED SURG/OB UMMC

## 2024-03-20 PROCEDURE — 82805 BLOOD GASES W/O2 SATURATION: CPT

## 2024-03-20 PROCEDURE — 36415 COLL VENOUS BLD VENIPUNCTURE: CPT

## 2024-03-20 PROCEDURE — 250N000013 HC RX MED GY IP 250 OP 250 PS 637

## 2024-03-20 PROCEDURE — 85027 COMPLETE CBC AUTOMATED: CPT

## 2024-03-20 PROCEDURE — 250N000013 HC RX MED GY IP 250 OP 250 PS 637: Performed by: HOSPITALIST

## 2024-03-20 PROCEDURE — 85007 BL SMEAR W/DIFF WBC COUNT: CPT

## 2024-03-20 PROCEDURE — 99233 SBSQ HOSP IP/OBS HIGH 50: CPT | Mod: GC | Performed by: INTERNAL MEDICINE

## 2024-03-20 PROCEDURE — 97530 THERAPEUTIC ACTIVITIES: CPT | Mod: GP

## 2024-03-20 PROCEDURE — 84100 ASSAY OF PHOSPHORUS: CPT

## 2024-03-20 PROCEDURE — 80048 BASIC METABOLIC PNL TOTAL CA: CPT

## 2024-03-20 RX ADMIN — APIXABAN 2.5 MG: 2.5 TABLET, FILM COATED ORAL at 08:19

## 2024-03-20 RX ADMIN — POTASSIUM CHLORIDE 10 MEQ: 750 TABLET, EXTENDED RELEASE ORAL at 08:19

## 2024-03-20 RX ADMIN — ISOSORBIDE MONONITRATE 40 MG: 20 TABLET ORAL at 22:07

## 2024-03-20 RX ADMIN — TIMOLOL MALEATE 1 DROP: 5 SOLUTION/ DROPS OPHTHALMIC at 08:21

## 2024-03-20 RX ADMIN — CALCIUM 500 MG: 500 TABLET ORAL at 11:48

## 2024-03-20 RX ADMIN — PANTOPRAZOLE SODIUM 40 MG: 40 TABLET, DELAYED RELEASE ORAL at 08:19

## 2024-03-20 RX ADMIN — APIXABAN 2.5 MG: 2.5 TABLET, FILM COATED ORAL at 22:08

## 2024-03-20 RX ADMIN — ISOSORBIDE MONONITRATE 40 MG: 20 TABLET ORAL at 14:13

## 2024-03-20 RX ADMIN — PSYLLIUM HUSK 1 PACKET: 3.4 POWDER ORAL at 14:13

## 2024-03-20 RX ADMIN — ROSUVASTATIN CALCIUM 10 MG: 10 TABLET, FILM COATED ORAL at 08:19

## 2024-03-20 RX ADMIN — CYANOCOBALAMIN TAB 500 MCG 500 MCG: 500 TAB at 08:19

## 2024-03-20 RX ADMIN — ALLOPURINOL 100 MG: 100 TABLET ORAL at 08:19

## 2024-03-20 RX ADMIN — METOPROLOL TARTRATE 50 MG: 50 TABLET, FILM COATED ORAL at 22:07

## 2024-03-20 RX ADMIN — ISOSORBIDE MONONITRATE 40 MG: 20 TABLET ORAL at 08:20

## 2024-03-20 RX ADMIN — PANTOPRAZOLE SODIUM 40 MG: 40 TABLET, DELAYED RELEASE ORAL at 22:08

## 2024-03-20 RX ADMIN — CIPROFLOXACIN HYDROCHLORIDE 500 MG: 500 TABLET, FILM COATED ORAL at 08:20

## 2024-03-20 RX ADMIN — POTASSIUM CHLORIDE 10 MEQ: 750 TABLET, EXTENDED RELEASE ORAL at 22:08

## 2024-03-20 RX ADMIN — CLOPIDOGREL BISULFATE 75 MG: 75 TABLET ORAL at 08:19

## 2024-03-20 RX ADMIN — METOPROLOL TARTRATE 50 MG: 50 TABLET, FILM COATED ORAL at 08:19

## 2024-03-20 RX ADMIN — FLUTICASONE FUROATE AND VILANTEROL TRIFENATATE 1 PUFF: 100; 25 POWDER RESPIRATORY (INHALATION) at 08:21

## 2024-03-20 ASSESSMENT — ACTIVITIES OF DAILY LIVING (ADL)
ADLS_ACUITY_SCORE: 33
ADLS_ACUITY_SCORE: 35
ADLS_ACUITY_SCORE: 33
ADLS_ACUITY_SCORE: 35
ADLS_ACUITY_SCORE: 33
ADLS_ACUITY_SCORE: 35
ADLS_ACUITY_SCORE: 33

## 2024-03-20 NOTE — PLAN OF CARE
Goal Outcome Evaluation:  Plan of Care Reviewed With: patient  Overall Patient Progress: no change  Outcome Evaluation:     Assumed Cares 8809-5402: Pt denies dizziness/pain/nausea. Switched to oral antibx, monitoring overnight for any changes and potential discharge tomorrow.    A+Ox4. Voiding adequately. Frequent BMs at baseline, metamucil started. VSS on RA.   Temp: 97.8  F (36.6  C)    BP: 134/70    Pulse: 90    Resp: 24    SpO2: 98 %    O2 Device: None (Room air)

## 2024-03-20 NOTE — PROGRESS NOTES
Resident/Fellow Attestation    I, Rose Polanco MD, was present with the medical student who participated in the service and in the documentation of the note.  I have verified the history and personally performed the physical exam and medical decision making.  I agree with the assessment and plan of care as documented in the note.      Patient was transition to Ciprofloxacin today. She will be monitored inpatient due to history of allergy (though reaction is unknown). Will complete 7 day course of antibiotics. Likely discharge tomorrow.     Rose Polanco MD  PGY1  Date of Service (when I saw the patient): 03/20/24     [facility]    Progress Note - Medicine Service, MAROON TEAM 3       Date of Admission:  3/17/2024    Assessment & Plan   Tessa Mansfield is a 89 year old female with pmh of htn, CAD and MI s/p stent placements, sinus node dysfunction s/p dual chamber pacemaker placement, afib, and stage 4 CKD presenting on 3/17/2024 with acute falls and concern for acute pyelonephritis.    Today 3/20:  - PO ciprofloxacin 500 mg daily (3/20-3/23) - monitor any reactions, continue if tolerated  - Psyllium daily for acute on chronic diarrhea    # Sepsis 2/2 urinary tract infection (suspected pyelonephritis) with E. Coli bacteremia - improving  One month of incontinence and a few weeks of dysuria with occasional suprapubic tenderness, none on admission exam. Found to have Leukocytosis with neutrophilic predominance. Elevated inflammatory markers with procal at 13 and CRP at 41. Bilateral renal ultrasound without hydronephrosis but urinary bladder debris present. +CVA tenderness on exam on admission. PRV was 150 ml. With evidence of UTI and +CVA tenderness, dx of atypical presentation of uncomplicated pyelonephritis is most likely, possibly exacerbated by dehydration. Chart documentation of allergy to ciprofloxacin without anaphylactic reaction, patient unable to recall  reaction. Will trial tomorrow inpatient and continue if tolerated.  - Micro:    - 3/18 E. Coli sensitive to all meds but indeterminate ampicillin   - 3/17 blood cultures x2- 3 of 4 with E. coli   - 3/17 urine culture- E. coli   - 3/17 COVID/flu/RSV- negative  - Antibiotics:   - Start ciprofloxacin 500 mg q24 today (3/20 - 3/23) for bacteremia - monitor tolerance  - Completed ceftriaxone (3/17 - 3/19)  - PRV on 3/18 150 mL  - PRN acetaminophen 650 for pain/fever    #Acute on chronic diarrhea  #New metabolic acidosis  Several months of diarrhea at baseline. BVG 3/20: pH 7.38 / CO2 33 / bicarb 16. Potentially due to acute on chronic diarrhea.  - Psyllium    #Acute syncope with falls  #Weakness  No prior history of falls, but some baseline instability with peripheral neuropathy. Two falls on 3/16 without loss of consciousness, lightheadedness, or dizziness. Family was able to assist the patient with getting up. Per family, patient had decreased blood pressure after falls. 500 mL NS bolus in ED, EKG without concern for acute cardiac pathology and troponin stable at 34. CK normal. Differential includes most likely orthostatic hypotension given fluctuations in blood pressure. Additional strong consideration for infection given diagnostics discussed below. Considered broad differential including toxin-mediated, seizure, new cardiac pathology, and spinal cord abscess, however, highly unlikely given history, PE, and lab information.  - CT head negative for acute intracranial pathology  - Normal B12 and folate labs  - PT    #Mild SHAJI vs CKD progression  #Hypokalemia  #Stage IV CKD with proteinuria  Follows with Dr. Martin at [facility]. Baseline creatinine 1.8-2.2 per documentation. Creatinine at 2.69 on admission, concern for mild SHAJI 2/2 infection or dehydration. 500 mL NS in ED and bolus of LR on 3/18. Cr now downtrending.   - Fluid bolus as needed. Trend creatinine.  - Continue potassium chloride 10 meq BID -  electrolyte replacement protocol  ________________________________  Chronic/stable:    #Essential hypertension  Typical systolic BPs in 130s per patient. Systolic pressures in 110s on presentation to ED, now stable in 130s. Held anti-hypertensive medications in the setting of acute illness. Restarting Imdur and metoprolol on 3/19 with some dizziness, will introduce lower metoprolol dose and increase as tolerated.  - Hold PTA Amlodipine 10 mg daily  - Hold PTA Furosemide 40 mg BID  - Hold PTA Hydralazine 10  mg TID  - Restart PTA Isosorbide mononitrate 40 mg TID  - Restart PTA Metoprolol tartrate on 3/19 but at 50 mg BID, will increase to 100 mg BID as tolerated    #CAD  #History of MI  #Sinus node dysfunction s/p pacemaker placement  #Paroxysmal atrial fibrillation  #CHF with preserved ejection fraction  #Thrombocytopenia due to medication  Follows with Dr. Santoyo and Dr. Zhu at [facility]. Hx of CAD and MI with eight stents placed in 2004. Additional hx of sinus node dysfunction s/p dual chamber medtronic pacemaker placed in 2007. Diagnosis of atrial fibrillation in 2023. Inpt EKG without concern for acute cardiac pathology and troponin stable at 34. BNP 2000+ with history convincing for CHF. Platelets in 60s, thrombocytopenia previously documented in notes.  - Continue Apixaban 2.5 mg BID  - Continue Clopidogrel 75 mg daily  - Continue Rosuvastatin 20 mg daily    #Dyspnea on exertion  #Chronic cough  - Continue PTA Budesonide 0.5 mg/2mL  - Provided Breo Ellipta 100-25 mcg interchanged for PTA Advair 250-50 mcg daily    #Idiopathic gout  - Restart PTA Allopurinol 100 mg daily    #Chronic anemia  - Continue Ferrous sulfate 325 mg    #GERD  - Provided pantoprazole 40 mg BID interchanged for PTA Omeprazole 40 mg daily  - PRN tums    #Glaucoma  - Continue PTA Timolol ophthalmic solution    #Vitamin supplementation  - Continue PTA Vitamin B12 1000 mcg  - Continue PTA Vitamin D3 25 mcg every other  day    #History of malignancy  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          Diet: Combination Diet Low Saturated Fat Na <2400mg Diet, No Caffeine Diet    DVT Prophylaxis: DOAC and Pneumatic Compression Devices  Bonner Catheter: Not present  Fluids: D5W 75 mL/hr over 12 hours  Lines: None     Cardiac Monitoring: None  Code Status: Full Code      Clinically Significant Risk Factors         # Hypernatremia: Highest Na = 146 mmol/L in last 2 days, will monitor as appropriate      # Hypoalbuminemia: Lowest albumin = 3.4 g/dL at 3/18/2024  5:30 AM, will monitor as appropriate     # Thrombocytopenia: Lowest platelets = 62 in last 2 days, will monitor for bleeding   # Hypertension: Noted on problem list  # Chronic heart failure with preserved ejection fraction: heart failure noted on problem list and last echo with EF >50%           # Financial/Environmental Concerns: none   # Pacemaker present       Disposition Plan      Expected Discharge Date: 03/21/2024      Destination: home with family          The patient's care was discussed with the Attending Physician, Dr. Alaniz and Patient.    Jamila Scanlon  MS3  Medicine Service, [facility] TEAM [address] [place] [address]  Securely message with [employer] (more info)  Text page via [employer] Paging/Directory   See signed in provider for up to date coverage information  ______________________________________________________________________    Interval History   NAEON. Feeling very well this morning. No complaints of dizziness.    Physical Exam   Vital Signs: Temp: 97.8  F (36.6  C) Temp src: Oral BP: 134/70 Pulse: 90   Resp: 24 SpO2: 98 % O2 Device: None (Room air)    Weight: 156 lbs 12.8 oz    General: sitting up in bed, NAD, appears comfortable, not diaphoretic  HEENT: no scleral icterus or conjunctival injection, oropharynx  clear  Resp: clear to auscultation bilaterally, no crackles or wheezes  Cardiac: Normal rate but irregular rhythm. Soft systolic murmur heard in left upper sternal border.  Abdomen: soft, non-tender, non-distended. No rebound or guarding.   Extremities: warm, lower extm seem moderately swollen but no erythema or tenderness to palpation of lower legs  Neuro: alert, answers questions appropriately  Psych: appropriate mood and affect  Skin: Black, scabbed areas of skin on tip of nose and posterior-lateral right neck with stitches and covered with steri-strips. Multiple skin tags on chest. Several macules of seborrheic keratosis on abdomen. Diffuse bruising on bilateral lower extremities. No rash noted.    Medical Decision Making       Please see A&P for additional details of medical decision making.      Data     I have personally reviewed the following data over the past 24 hrs:    8.7  \   8.2 (L)   / 87 (L)     142 111 (H) 36.9 (H) /  90   4.1 16 (L) 2.27 (H) \

## 2024-03-20 NOTE — PLAN OF CARE
Goal Outcome Evaluation:                     Outcome Evaluation: pt is alert and oriented. calm and pleasant. VSS on room air. No reports of dizziness this shift. pt independently ambulated in the room. voiding adequately. No BM this shift. administered tyelnol. some releif. Cont POC.

## 2024-03-20 NOTE — PROGRESS NOTES
Hours of care: [id number]  Neuro: A&Ox4.   Cardiac: Afebrile, VSS.   Respiratory: RA, lung sounds clear, denies SOB  GI/: Voiding spontaneously. No BM this shift.  Diet/appetite: Tolerating regular diet . Denies nausea.   Activity: Up with SBA    Pain: Denies   Skin: No new deficits noted.  Lines: PIV, SL  Drains: none  Replacements: none given    Plan: Continue with current POC. Report changes to primary team.

## 2024-03-21 VITALS
SYSTOLIC BLOOD PRESSURE: 159 MMHG | HEART RATE: 69 BPM | DIASTOLIC BLOOD PRESSURE: 76 MMHG | BODY MASS INDEX: 24.93 KG/M2 | OXYGEN SATURATION: 98 % | TEMPERATURE: 97.6 F | WEIGHT: 156.8 LBS | RESPIRATION RATE: 24 BRPM

## 2024-03-21 LAB
ANION GAP SERPL CALCULATED.3IONS-SCNC: 14 MMOL/L (ref 7–15)
BUN SERPL-MCNC: 34.6 MG/DL (ref 8–23)
CALCIUM SERPL-MCNC: 9.3 MG/DL (ref 8.8–10.2)
CHLORIDE SERPL-SCNC: 111 MMOL/L (ref 98–107)
CREAT SERPL-MCNC: 2.41 MG/DL (ref 0.51–0.95)
DEPRECATED HCO3 PLAS-SCNC: 16 MMOL/L (ref 22–29)
EGFRCR SERPLBLD CKD-EPI 2021: 19 ML/MIN/1.73M2
ERYTHROCYTE [DISTWIDTH] IN BLOOD BY AUTOMATED COUNT: 15.9 % (ref 10–15)
GLUCOSE SERPL-MCNC: 87 MG/DL (ref 70–99)
HCT VFR BLD AUTO: 28.5 % (ref 35–47)
HGB BLD-MCNC: 8.5 G/DL (ref 11.7–15.7)
MCH RBC QN AUTO: 27.5 PG (ref 26.5–33)
MCHC RBC AUTO-ENTMCNC: 29.8 G/DL (ref 31.5–36.5)
MCV RBC AUTO: 92 FL (ref 78–100)
PLATELET # BLD AUTO: 114 10E3/UL (ref 150–450)
POTASSIUM SERPL-SCNC: 4 MMOL/L (ref 3.4–5.3)
RBC # BLD AUTO: 3.09 10E6/UL (ref 3.8–5.2)
SODIUM SERPL-SCNC: 141 MMOL/L (ref 135–145)
WBC # BLD AUTO: 7.1 10E3/UL (ref 4–11)

## 2024-03-21 PROCEDURE — 99238 HOSP IP/OBS DSCHRG MGMT 30/<: CPT | Mod: GC | Performed by: INTERNAL MEDICINE

## 2024-03-21 PROCEDURE — 80048 BASIC METABOLIC PNL TOTAL CA: CPT

## 2024-03-21 PROCEDURE — 36415 COLL VENOUS BLD VENIPUNCTURE: CPT

## 2024-03-21 PROCEDURE — 85027 COMPLETE CBC AUTOMATED: CPT

## 2024-03-21 PROCEDURE — 250N000013 HC RX MED GY IP 250 OP 250 PS 637

## 2024-03-21 PROCEDURE — 250N000013 HC RX MED GY IP 250 OP 250 PS 637: Performed by: HOSPITALIST

## 2024-03-21 RX ORDER — METOPROLOL TARTRATE 50 MG
50 TABLET ORAL 2 TIMES DAILY
Qty: 60 TABLET | Refills: 1 | Status: ON HOLD | OUTPATIENT
Start: 2024-03-21 | End: 2024-05-07

## 2024-03-21 RX ORDER — CIPROFLOXACIN 500 MG/1
500 TABLET, FILM COATED ORAL EVERY 24 HOURS
Qty: 2 TABLET | Refills: 0 | Status: SHIPPED | OUTPATIENT
Start: 2024-03-21 | End: 2024-03-23

## 2024-03-21 RX ADMIN — METOPROLOL TARTRATE 50 MG: 50 TABLET, FILM COATED ORAL at 08:57

## 2024-03-21 RX ADMIN — ROSUVASTATIN CALCIUM 10 MG: 10 TABLET, FILM COATED ORAL at 08:58

## 2024-03-21 RX ADMIN — TIMOLOL MALEATE 1 DROP: 5 SOLUTION/ DROPS OPHTHALMIC at 08:57

## 2024-03-21 RX ADMIN — CIPROFLOXACIN HYDROCHLORIDE 500 MG: 500 TABLET, FILM COATED ORAL at 08:57

## 2024-03-21 RX ADMIN — APIXABAN 2.5 MG: 2.5 TABLET, FILM COATED ORAL at 08:58

## 2024-03-21 RX ADMIN — ISOSORBIDE MONONITRATE 40 MG: 20 TABLET ORAL at 08:58

## 2024-03-21 RX ADMIN — FLUTICASONE FUROATE AND VILANTEROL TRIFENATATE 1 PUFF: 100; 25 POWDER RESPIRATORY (INHALATION) at 08:56

## 2024-03-21 RX ADMIN — CLOPIDOGREL BISULFATE 75 MG: 75 TABLET ORAL at 08:58

## 2024-03-21 RX ADMIN — CYANOCOBALAMIN TAB 500 MCG 500 MCG: 500 TAB at 08:57

## 2024-03-21 RX ADMIN — PANTOPRAZOLE SODIUM 40 MG: 40 TABLET, DELAYED RELEASE ORAL at 08:57

## 2024-03-21 RX ADMIN — Medication 25 MCG: at 08:58

## 2024-03-21 RX ADMIN — PSYLLIUM HUSK 1 PACKET: 3.4 POWDER ORAL at 08:56

## 2024-03-21 RX ADMIN — POTASSIUM CHLORIDE 10 MEQ: 750 TABLET, EXTENDED RELEASE ORAL at 08:58

## 2024-03-21 RX ADMIN — ALLOPURINOL 100 MG: 100 TABLET ORAL at 08:57

## 2024-03-21 RX ADMIN — ACETAMINOPHEN 650 MG: 325 TABLET, FILM COATED ORAL at 09:03

## 2024-03-21 ASSESSMENT — ACTIVITIES OF DAILY LIVING (ADL)
ADLS_ACUITY_SCORE: 33

## 2024-03-21 NOTE — PLAN OF CARE
Goal Outcome Evaluation:      Plan of Care Reviewed With: patient    Overall Patient Progress: no changeOverall Patient Progress: no change    Outcome Evaluation: Continue with POC    No acute changes. Aox4, on RA, hypertensive but within parameters. Up independently in room. Slept most of the night. Call light in reach and able to make needs known. Plan for discharge today

## 2024-03-21 NOTE — PROGRESS NOTES
Care Management Discharge Note    Discharge Date: 03/21/2024       Discharge Disposition: home     Discharge Services:  NA    Discharge DME:  NA    Discharge Transportation: family or friend will provide    Private pay costs discussed: Not applicable    Does the patient's insurance plan have a 3 day qualifying hospital stay waiver?  No    PAS Confirmation Code:  NA  Patient/family educated on Medicare website which has current facility and service quality ratings:  NA    Education Provided on the Discharge Plan:  yes  Persons Notified of Discharge Plans: pt  Patient/Family in Agreement with the Plan:  yes    Handoff Referral Completed: Yes    Additional Information:    Pt is medically ready for discharge home today. Her  picked her up. No discharge needs.     Monse Hall, RN  7C RN Coordinator  Phone: [phone]  Pager: [phone]

## 2024-03-21 NOTE — DISCHARGE SUMMARY
[facility]  Discharge Summary - Medicine & Pediatrics       Date of Admission:  3/17/2024  Date of Discharge:  3/21/2024  Discharging Provider: Rose Polanco MD PGY-1   Discharge Service: Medicine Service, [place] TEAM 3    Discharge Diagnoses   Sepsis 2/2 urinary tract infection (suspected pyelonephritis) with E. Coli bacteremia  Acute Syncope with falls   Weakness   Acute Kidney Injury   Chronic Kidney Disease   Hypertension   Atrial fibrillation     Clinically Significant Risk Factors          Follow-ups Needed After Discharge   Follow-up Appointments     Adult [facility] Follow-up and recommended labs and tests      Follow up with primary care provider, Pedro Gomez, within 1-2   weeks, for hospital follow- up. The following labs/tests are recommended:   BMP.     Please re-evaluate BP and medications. Lasix and K supplement and   Hydralazine were not restarted prior to discharge.     Appointments on [place] and/or [place] campus (with [facility] or [facility]   provider or service). Call [phone] if you haven't heard regarding   these appointments within 7 days of discharge.            Discharge Disposition   Discharged to home  Condition at discharge: Stable    Hospital Course   Tessa Mansfield was admitted on 3/17/2024 for sepsis secondary to urinary tract infection with E. coli bacteremia and syncope. The following problems were addressed during her hospitalization:    #Sepsis 2/2 urinary tract infection (suspected pyelonephritis) with E. Coli bacteremia - improving   Presented with leukocytosis of 22 and neutrophilic predominance.  UA showed moderate blood, protein, positive nitrites and trace leukocyte Estrace.  Bilateral renal ultrasound without hydronephrosis but urinary bladder debris present.    - Micro:               - 3/18 E. Coli sensitive to all meds but indeterminate ampicillin              - 3/17 blood cultures x2- 3 of 4 with E. coli              -  3/17 urine culture- E. coli  - Antibiotics:   - Start ciprofloxacin 500 mg q24 today (3/20 - 3/23)  - Completed ceftriaxone (3/17 - 3/19)    #Acute syncope with falls   Falls were likely in the setting of sepsis and hypotension.  Head CT was negative for acute intracranial pathology.    #Acute Kidney Injury   #CKD Stage IV   Baseline creatinine 1.8-2.2 per documentation. Creatinine at 2.69 on admission, improved during hospitalization after administration of fluids.     #Essential Hypertension   Blood pressure medications were initially held due to acute illness.  We slowly reintroduce these medications, but she has not been restarted on all PTA medications prior to discharge.  Plan to follow-up with PCP for reevaluation.  - Continue PTA Amlodipine 10 mg daily  - Continue  PTA Isosorbide mononitrate 40 mg TID  - Reduced PTA Metoprolol tartrate to 50 mg BID,  from 100mg BID   - Hold PTA Furosemide 40 mg BID -- Cr still elevated from baseline will avoid overdiuresis   - Hold PTA Hydralazine 10  mg TID    #CAD  #History of MI  #Sinus node dysfunction s/p pacemaker placement  #Paroxysmal atrial fibrillation  #CHF with preserved ejection fraction  Hx of CAD and MI with eight stents placed in 2004. Additional hx of sinus node dysfunction s/p dual chamber medtronic pacemaker placed in 2007. Diagnosis of atrial fibrillation in 2023. Inpt EKG without concern for acute cardiac pathology and troponin stable at 34.   - Continue Apixaban 2.5 mg BID  - Continue Clopidogrel 75 mg daily  - Continue Rosuvastatin 20 mg daily    Consultations This Hospital Stay   PHYSICAL THERAPY ADULT IP CONSULT  OCCUPATIONAL THERAPY ADULT IP CONSULT  NURSING TO CONSULT FOR VASCULAR ACCESS CARE IP CONSULT  [facility] IP CONSULT    Code Status   Full Code     The patient was discussed with Dr. Corbin Polanco MD  [facility] Team, Medicine Service  [facility] 7C MED SURG  [address] [address]  Phone:  [phone]  ______________________________________________________________________    Physical Exam   Vital Signs: Temp: 97.6  F (36.4  C) Temp src: Oral BP: (!) 159/76 Pulse: 69   Resp: 24 SpO2: 98 % O2 Device: None (Room air)    Weight: 156 lbs 12.8 oz    General Appearance: Sitting up comfortably in bed.   Neck: Well-healing incision to right neck, now s/p removal of stiches   Respiratory: Normal effort on room air, clear to auscultation.   Cardiovascular: RRR, no murmur noted.   GI: Soft, non-tender, non-distended + bowel sounds   Skin: Warm and dry, several scattered ecchymosis   Other: Edema to LE, no pitting        Primary Care Physician   Pedro Gomez    Discharge Orders      Physical Therapy  Referral      Reason for your hospital stay    Tessa, you were hospitalized for an infection of your urinary tract system that spread into your blood. The bacteria causing the infection is called E. Coli. You were given IV antibiotics to treat the infection and will go home with oral antibiotics. When you were admitted you had low blood pressure that can cause people to feel dizzy and faint. We held some of your blood pressure medications while you were in the hospital. Do not take your Lasix, hydralazine, or potassium supplements until you follow up with your Primary care doctor. Please check your blood pressure one time a day and write this down to bring to your doctor who can then re-start your blood pressure medications if you are still running high.     It was a pleasure to take care of you.   Take care,   Dr. Polanco     Activity    Your activity upon discharge: activity as tolerated     Adult [facility] Follow-up and recommended labs and tests    Follow up with primary care provider, Pedro Gomez, within 1-2 weeks, for hospital follow- up. The following labs/tests are recommended: BMP.     Please re-evaluate BP and medications. Lasix and K supplement and Hydralazine were not restarted prior  to discharge.     Appointments on [place] and/or [facility] (with [facility] or [facility] provider or service). Call [phone] if you haven't heard regarding these appointments within 7 days of discharge.     Diet    Follow this diet upon discharge: Orders Placed This Encounter      Combination Diet Low Saturated Fat Na <2400mg Diet, No Caffeine Diet       Significant Results and Procedures   Most Recent 3 CBC's:  Recent Labs   Lab Test 03/21/24  0606 03/20/24  0609 03/19/24  0558   WBC 7.1 8.7 12.7*   HGB 8.5* 8.2* 8.4*   MCV 92 92 92   * 87* 62*     Most Recent 3 BMP's:  Recent Labs   Lab Test 03/21/24  0606 03/20/24  0609 03/19/24  0558    142 146*   POTASSIUM 4.0 4.1 3.8   CHLORIDE 111* 111* 113*   CO2 16* 16* 17*   BUN 34.6* 36.9* 41.9*   CR 2.41* 2.27* 2.31*   ANIONGAP 14 15 16*   ALEXANDRO 9.3 9.2 9.2   GLC 87 90 95     Discharge Medications   Current Discharge Medication List        START taking these medications    Details   ciprofloxacin (CIPRO) 500 MG tablet Take 1 tablet (500 mg) by mouth every 24 hours for 2 days  Qty: 2 tablet, Refills: 0    Associated Diagnoses: Acute cystitis with hematuria      psyllium (METAMUCIL/KONSYL) Packet Take 1 packet by mouth daily  Qty: 30 each, Refills: 0    Associated Diagnoses: Chronic diarrhea           CONTINUE these medications which have CHANGED    Details   metoprolol tartrate (LOPRESSOR) 50 MG tablet Take 1 tablet (50 mg) by mouth 2 times daily  Qty: 60 tablet, Refills: 1    Associated Diagnoses: Chronic atrial fibrillation (H)           CONTINUE these medications which have NOT CHANGED    Details   allopurinol (ZYLOPRIM) 100 MG tablet Take 1 tablet by mouth once daily  Qty: 90 tablet, Refills: 0    Associated Diagnoses: Idiopathic gout, unspecified chronicity, unspecified site      amLODIPine (NORVASC) 10 MG tablet Take 1 tablet (10 mg) by mouth daily  Qty: 90 tablet, Refills: 0    Associated Diagnoses: Essential hypertension      apixaban ANTICOAGULANT  (ELIQUIS) 2.5 MG tablet Take 1 tablet (2.5 mg) by mouth 2 times daily  Qty: 180 tablet, Refills: 3    Associated Diagnoses: Renal hypertension      budesonide (PULMICORT) 0.5 MG/2ML neb solution Take 2 mLs (0.5 mg) by nebulization 2 times daily  Qty: 2 mL, Refills: 3    Associated Diagnoses: Chronic cough      calcium carbonate 500 mg, elemental, (OSCAL 500) 1250 (500 Ca) MG TABS tablet Take 1 tablet by mouth daily      clopidogrel (PLAVIX) 75 MG tablet Take 1 tablet (75 mg) by mouth daily  Qty: 90 tablet, Refills: 1    Associated Diagnoses: S/P coronary artery stent placement      cyanocolbalamin (VITAMIN B-12) 1000 MCG tablet Take 500 mcg by mouth daily As directed      ferrous sulfate (FEROSUL) 325 (65 Fe) MG tablet Take 1 tablet (325 mg) by mouth every other day  Qty: 45 tablet, Refills: 1    Associated Diagnoses: 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 3b chronic kidney disease (H)      fluticasone-salmeterol (ADVAIR) 250-50 MCG/ACT inhaler INHALE 1 DOSE BY MOUTH TWICE DAILY  Qty: 60 each, Refills: 8    Associated Diagnoses: Chronic cough      isosorbide mononitrate (ISMO/MONOKET) 20 MG tablet Take 2 tablets (40 mg) by mouth 3 times daily  Qty: 540 tablet, Refills: 3    Associated Diagnoses: Chest pain, unspecified type      Multiple Vitamins-Minerals (PRESERVISION AREDS 2+MULTI VIT PO) Take by mouth 2 times daily      omeprazole (PRILOSEC) 40 MG DR capsule Take 1 capsule (40 mg) by mouth daily  Qty: 90 capsule, Refills: 3    Associated Diagnoses: Gastroesophageal reflux disease without esophagitis      rosuvastatin (CRESTOR) 20 MG tablet Take 1 tablet (20 mg) by mouth daily for 360 days  Qty: 90 tablet, Refills: 3    Associated Diagnoses: Coronary artery disease of native artery of native heart with stable angina pectoris (H24)      timolol maleate (TIMOPTIC) 0.5 % ophthalmic solution INSTILL 1 DROP INTO EACH EYE ONCE DAILY IN THE MORNING      vitamin D3 25 mcg (1000 units) tablet Take 1 tablet (25 mcg) by mouth every other  "day  Qty: 90 tablet, Refills: 3    Associated Diagnoses: Secondary renal hyperparathyroidism (H24); CKD (chronic kidney disease) stage 4, GFR 15-29 ml/min (H)           STOP taking these medications       furosemide (LASIX) 40 MG tablet Comments:   Reason for Stopping:         hydrALAZINE (APRESOLINE) 10 MG tablet Comments:   Reason for Stopping:         potassium chloride ER (K-TAB/KLOR-CON) 10 MEQ CR tablet Comments:   Reason for Stopping:             Allergies   Allergies   Allergen Reactions    Ciprofloxacin Rash    Codeine Sulfate Itching    Shrimp Swelling    Bactrim [Sulfamethoxazole W/Trimethoprim]      Patient unable to recall    Biaxin [Clarithromycin]     Chlorthalidone Nausea and Vomiting    Clonidine     Darvon [Propoxyphene Hcl]     Dilaudid [Hydromorphone] Visual Disturbance and Hallucination    Gabapentin Fatigue and Confusion     \"felt drunk\"    Indomethacin     Levaquin [Levofloxacin Hemihydrate]     Morphine Sulfate     Percocet [Oxycodone-Acetaminophen] Hallucination    Pregabalin Itching and Fatigue    Simvastatin Palpitations     Muscle weakness, leg cramping      Spironolactone      Dehydrated      Terazosin      "

## 2024-03-21 NOTE — PROGRESS NOTES
Patient discharged to home with . Iv removed and pressure dressing applied. Gathered belongings. Understands the discharge orders and medications. Picked up meds at the pharm

## 2024-03-21 NOTE — PLAN OF CARE
Physical Therapy Discharge Summary    Reason for therapy discharge:    All goals and outcomes met, no further needs identified.    Progress towards therapy goal(s). See goals on Care Plan in Epic electronic health record for goal details.  Goals met    Therapy recommendation(s):    Continued therapy is recommended.  Rationale/Recommendations:  Recommend outpatient PT to further address functional mobility and higher level balance. Patient is mobilizing here at the hospital independently and reports no further needs for acute care.

## 2024-03-25 ENCOUNTER — PATIENT OUTREACH (OUTPATIENT)
Dept: CARE COORDINATION | Facility: CLINIC | Age: 89
End: 2024-03-25
Payer: COMMERCIAL

## 2024-03-25 NOTE — PROGRESS NOTES
"Clinic Care Coordination Contact    Situation: Patient chart reviewed by care coordinator.    Background: Referred by Inpatient RN for Care Transition related to \"Pt is discharge home with family today 3/21/24\" on 3/21/24.     Assessment: Per Chart Review, patient had a post-hospital call with Nephrology team regarding hospitalization. Patient has a hospital follow-up scheduled on 4/11/24 with PCP.     Plan/Recommendations: RN will close program at this time due to duplication of care management services.     LUPE CALLE RN on 3/27/2024 at 4:06 PM    "

## 2024-03-26 ENCOUNTER — PATIENT OUTREACH (OUTPATIENT)
Dept: NEPHROLOGY | Facility: CLINIC | Age: 89
End: 2024-03-26
Payer: COMMERCIAL

## 2024-03-26 DIAGNOSIS — N18.4 CKD (CHRONIC KIDNEY DISEASE) STAGE 4, GFR 15-29 ML/MIN (H): Primary | ICD-10-CM

## 2024-03-26 NOTE — TELEPHONE ENCOUNTER
Nephrology Care Coordination: Post-Discharge Note  SITUATION     Admission:      Acute cystitis  # Concern for acute Pyelonephritis      Discharge: Sepsis 2/2 urinary tract infection (suspected pyelonephritis) with E. Coli bacteremia  Acute Syncope with falls   Weakness   Acute Kidney Injury   Chronic Kidney Disease   Hypertension   Atrial fibrillation        BACKGROUND     Per hospital discharge summary and inpatient provider notes:  Acute Kidney Injury   #CKD Stage IV   Baseline creatinine 1.8-2.2 per documentation. Creatinine at 2.69 on admission, improved during hospitalization after administration of fluids.   Sepsis 2/2 urinary tract infection (suspected pyelonephritis) with E. Coli bacteremia - improving   Presented with leukocytosis of 22 and neutrophilic predominance.  UA showed moderate blood, protein, positive nitrites and trace leukocyte Estrace.  Bilateral renal ultrasound without hydronephrosis but urinary bladder debris present.    - Micro:               - 3/18 E. Coli sensitive to all meds but indeterminate ampicillin              - 3/17 blood cultures x2- 3 of 4 with E. coli              - 3/17 urine culture- E. coli    ASSESSMENT      Follow up with Mario Alberto NOLAND nephrology scheduled for next available on 6/17.   Patient concerned why her Lasix was not continued post discharge. She states her ankles have been swelling and she notices she is not urinating as much.  She has a follow up with her PCP 4/4.  Routing to Dr Martin to review her changes in medication.  Gladys RN Care Coordinator  Nephrology                        Nephrology    Volume Status         PLAN     Outpatient Plan:  Dr Martin to review medication changes    Future Appts Next 180 days       Visit Type Date Time Department    RETURN NEPHROLOGY 6/17/2024  1:20 PM [facility] NE[facility]ROLOGY    RETURN NEPHROLOGY 8/19/2024  1:45 PM [facility] NEPHROLOGY              For any urgent concerns, please contact our Nurse Triage Line [phone] ([phone])        CINTHIA MUNGUIA RN

## 2024-03-28 DIAGNOSIS — D63.1 ANEMIA DUE TO STAGE 3B CHRONIC KIDNEY DISEASE (H): ICD-10-CM

## 2024-03-28 DIAGNOSIS — M10.00 IDIOPATHIC GOUT, UNSPECIFIED CHRONICITY, UNSPECIFIED SITE: ICD-10-CM

## 2024-03-28 DIAGNOSIS — N18.32 ANEMIA DUE TO STAGE 3B CHRONIC KIDNEY DISEASE (H): ICD-10-CM

## 2024-03-28 RX ORDER — FERROUS SULFATE 325(65) MG
325 TABLET ORAL EVERY OTHER DAY
Qty: 45 TABLET | Refills: 0 | Status: ON HOLD | OUTPATIENT
Start: 2024-03-28 | End: 2024-05-03

## 2024-03-28 NOTE — PROGRESS NOTES
Patient called after a reply from Dr Martin. Patient was willing to go to the [facility] to be seen by nephrology. A visit was scheduled with the next available with Dr Ramirez on 4/18/24. Labs scheduled for 4/11/24. She states she is still concerned about the increased swelling in LE. She reports if she presses on her ankles there is a distinct indentation.   Patient report her home BP's since discharged from the hospital:  3/26 120/61  3/27 128/76  3/28 139/71  She requests Dr Martin have her restart Lasix before she her next schedule visit.  Routing to Dr Martin.  Gladys RN Care Coordinator  Nephrology

## 2024-03-28 NOTE — TELEPHONE ENCOUNTER
Nephrology Note: Medication Refill Request    Medication Refill Request:     Medication/Dose/Frequency:   FEROSUL 325 (65 Fe) MG tablet 45 tablet 0 3/28/2024 -- No   Sig - Route: TAKE 1 TABLET BY MOUTH EVERY OTHER DAY - Oral     Preferred Pharmacy: [facility] [place]  Provider:   Dr Martin                        SITUATION/BACKROUND:                 Last office visit: 2/19/24        Future office visit: 4/18/24     ASSESSMENT:     Neph Assessments:    Recent Labs:  CBC Results:  Recent Labs   Lab Test 03/21/24  0606   WBC 7.1   RBC 3.09*   HGB 8.5*   HCT 28.5*   MCV 92   MCH 27.5   MCHC 29.8*   RDW 15.9*   *     Last Renal Panel:  Sodium   Date Value Ref Range Status   03/21/2024 141 135 - 145 mmol/L Final     Comment:     Reference intervals for this test were updated on 09/26/2023 to more accurately reflect our healthy population. There may be differences in the flagging of prior results with similar values performed with this method. Interpretation of those prior results can be made in the context of the updated reference intervals.    08/26/2020 141 133 - 144 mmol/L Final     Potassium   Date Value Ref Range Status   03/21/2024 4.0 3.4 - 5.3 mmol/L Final   11/21/2022 4.8 3.4 - 5.3 mmol/L Final   08/26/2020 4.4 3.4 - 5.3 mmol/L Final     Chloride   Date Value Ref Range Status   03/21/2024 111 (H) 98 - 107 mmol/L Final   11/21/2022 110 (H) 94 - 109 mmol/L Final   08/26/2020 111 (H) 94 - 109 mmol/L Final     Carbon Dioxide   Date Value Ref Range Status   08/26/2020 24 20 - 32 mmol/L Final     Carbon Dioxide (CO2)   Date Value Ref Range Status   03/21/2024 16 (L) 22 - 29 mmol/L Final   11/21/2022 24 20 - 32 mmol/L Final     Anion Gap   Date Value Ref Range Status   03/21/2024 14 7 - 15 mmol/L Final   11/21/2022 6 3 - 14 mmol/L Final   08/26/2020 8 3 - 14 mmol/L Final     Glucose   Date Value Ref Range Status   03/21/2024 87 70 - 99 mg/dL Final   11/21/2022 79 70 - 99 mg/dL Final   08/26/2020 84 70 -  99 mg/dL Final     Urea Nitrogen   Date Value Ref Range Status   03/21/2024 34.6 (H) 8.0 - 23.0 mg/dL Final   11/21/2022 35 (H) 7 - 30 mg/dL Final   08/26/2020 41 (H) 7 - 30 mg/dL Final     Creatinine   Date Value Ref Range Status   03/21/2024 2.41 (H) 0.51 - 0.95 mg/dL Final   08/26/2020 1.70 (H) 0.52 - 1.04 mg/dL Final     GFR Estimate   Date Value Ref Range Status   03/21/2024 19 (L) >60 mL/min/1.73m2 Final   08/26/2020 27 (L) >60 mL/min/[1.73_m2] Final     Comment:     Non  GFR Calc  Starting 12/18/2018, serum creatinine based estimated GFR (eGFR) will be   calculated using the Chronic Kidney Disease Epidemiology Collaboration   (CKD-EPI) equation.       Calcium   Date Value Ref Range Status   03/21/2024 9.3 8.8 - 10.2 mg/dL Final   08/26/2020 9.0 8.5 - 10.1 mg/dL Final     Phosphorus   Date Value Ref Range Status   03/20/2024 3.5 2.5 - 4.5 mg/dL Final   08/26/2020 3.6 2.5 - 4.5 mg/dL Final     Albumin   Date Value Ref Range Status   03/18/2024 3.4 (L) 3.5 - 5.2 g/dL Final   11/21/2022 3.8 3.4 - 5.0 g/dL Final   08/26/2020 3.6 3.4 - 5.0 g/dL Final       Current Medication List:   Current Outpatient Medications (Antihypertensive, Cardiovascular, Diuretics, Beta blockers, Calcium blockers, Anticoagulants)   Medication Sig    amLODIPine (NORVASC) 10 MG tablet Take 1 tablet (10 mg) by mouth daily    apixaban ANTICOAGULANT (ELIQUIS) 2.5 MG tablet Take 1 tablet (2.5 mg) by mouth 2 times daily    metoprolol tartrate (LOPRESSOR) 50 MG tablet Take 1 tablet (50 mg) by mouth 2 times daily     Current Outpatient Medications (Other)   Medication Sig    allopurinol (ZYLOPRIM) 100 MG tablet Take 1 tablet by mouth once daily    budesonide (PULMICORT) 0.5 MG/2ML neb solution Take 2 mLs (0.5 mg) by nebulization 2 times daily    calcium carbonate 500 mg, elemental, (OSCAL 500) 1250 (500 Ca) MG TABS tablet Take 1 tablet by mouth daily    clopidogrel (PLAVIX) 75 MG tablet Take 1 tablet (75 mg) by mouth daily     cyanocolbalamin (VITAMIN B-12) 1000 MCG tablet Take 500 mcg by mouth daily As directed    FEROSUL 325 (65 Fe) MG tablet TAKE 1 TABLET BY MOUTH EVERY OTHER DAY    fluticasone-salmeterol (ADVAIR) 250-50 MCG/ACT inhaler INHALE 1 DOSE BY MOUTH TWICE DAILY    isosorbide mononitrate (ISMO/MONOKET) 20 MG tablet Take 2 tablets (40 mg) by mouth 3 times daily    Multiple Vitamins-Minerals (PRESERVISION AREDS 2+MULTI VIT PO) Take by mouth 2 times daily    omeprazole (PRILOSEC) 40 MG DR capsule Take 1 capsule (40 mg) by mouth daily    psyllium (METAMUCIL/KONSYL) Packet Take 1 packet by mouth daily    rosuvastatin (CRESTOR) 20 MG tablet Take 1 tablet (20 mg) by mouth daily for 360 days    timolol maleate (TIMOPTIC) 0.5 % ophthalmic solution INSTILL 1 DROP INTO EACH EYE ONCE DAILY IN THE MORNING    vitamin D3 25 mcg (1000 units) tablet Take 1 tablet (25 mcg) by mouth every other day       Has patient had kidney transplant in the prior 1 year: no.   Has patient been seen the last 12 months: Yes.  Associated labs reviewed for medication: Yes    PLAN:     Medication refilled per protocol: Yes    CINTHIA MUNGUIA RN

## 2024-03-29 NOTE — PROGRESS NOTES
Per Dr. Martin:  Can start 40mg daily and monitor weight and blood pressure  Lower amlodipine to 5mg if she is taking 10mg, that can cause swelling  Thanks     Left message for patient to return call to nephrology clinic to review the above message. It appears that patient is scheduled for a hospital follow up with Dr. Ramirez on 4/18.    CAROL Templeton (Covering for Gladys Tan RN)

## 2024-04-01 RX ORDER — FUROSEMIDE 40 MG
40 TABLET ORAL DAILY
Qty: 30 TABLET | Refills: 0 | Status: ON HOLD | OUTPATIENT
Start: 2024-04-01 | End: 2024-05-03

## 2024-04-01 RX ORDER — FUROSEMIDE 40 MG
40 TABLET ORAL DAILY
Qty: 90 TABLET | Refills: 3 | Status: SHIPPED | OUTPATIENT
Start: 2024-04-01 | End: 2024-04-01

## 2024-04-01 NOTE — TELEPHONE ENCOUNTER
Spoke with patient.  Per Dr Martin:  Per Dr. Martin:  Can start 40mg daily and monitor weight and blood pressure  Lower amlodipine to 5mg if she is taking 10mg, that can cause swelling  Thanks           Message relayed to patient. Informed her the prescription was sent for 40 mg Lasix daily. Patient confirmed she is currently taking 10 mg Amlodipine. She was instructed to cut her amlodipine tablets in half for a 5 mg dose. Patient stated and will take weights daily. She will report her weights next week.   CAROL Graham Care Coordinator  Nephrology

## 2024-04-03 ENCOUNTER — OFFICE VISIT (OUTPATIENT)
Dept: PULMONOLOGY | Facility: CLINIC | Age: 89
End: 2024-04-03
Attending: INTERNAL MEDICINE
Payer: COMMERCIAL

## 2024-04-03 VITALS
HEIGHT: 67 IN | SYSTOLIC BLOOD PRESSURE: 169 MMHG | OXYGEN SATURATION: 98 % | DIASTOLIC BLOOD PRESSURE: 76 MMHG | WEIGHT: 153 LBS | BODY MASS INDEX: 24.01 KG/M2 | HEART RATE: 85 BPM

## 2024-04-03 DIAGNOSIS — R05.3 CHRONIC COUGH: Primary | ICD-10-CM

## 2024-04-03 PROCEDURE — G0463 HOSPITAL OUTPT CLINIC VISIT: HCPCS | Performed by: INTERNAL MEDICINE

## 2024-04-03 PROCEDURE — 99213 OFFICE O/P EST LOW 20 MIN: CPT | Performed by: INTERNAL MEDICINE

## 2024-04-03 ASSESSMENT — PAIN SCALES - GENERAL: PAINLEVEL: NO PAIN (0)

## 2024-04-03 NOTE — PROGRESS NOTES
Pulmonology Clinic Follow up Visit       Tessa Mansfield MRN# [id number]   YOB: 1935 Age: 89 year old     Date of Visit: 4/3/2024    Reason for Visit: Chronic cough and probable cough variant asthma          Assessment and Plan:     ## Chronic cough - resolved  ## Probable cough variant asthma  See my previous note from 6/15/2022 for detailed history.  In brief this patient developed chronic cough potentially related to COVID infection which was responsive to prednisone but returned after prednisone was completed.  Extensive evaluation including PFTs and imaging were unrevealing.  She was treated with LABA ICS without success so nebulized Pulmicort was added.  Since starting that her cough is completely resolved.  The Pulmicort was subsequently discontinued without return of the cough, and she was later able to discontinue the Dulera.  At this point it is unclear if her cough was due to an extremely prolonged postviral syndrome versus cough variant asthma with some unknown stimuli.  Given the resolution of her symptoms, she may remain off medication for now, but should keep it on hand in case symptoms return.  -Remain off Pulmicort and Dulera for now  -Keep Dulera on hand to use if cough returns.           ## Pulmonary nodules  Multiple incidental identified subpleural nodules, largest which measures 8 mm.  -Following with pulmonary nodule clinic        Return to clinic: FER Blair M.D.  Pulmonary & Critical Care  Pager: Click Here to page          History of Present Illness / Interval History:     Tessa Mansfield is a 89 year old female with H/O coronary artery disease with pacemaker and history of Covid in 2021 who presents to follow-up chronic dry cough present since spring 2021.     Last seen: 9/13/2022    Hospitalized 3/17 - 3/21 for urosepsis and syncope    Seen by interventional pulm on 2/21/2023 for indeterminate pulmonary nodules with plan to repeat CT February  "2024    Otherwise she states that \"everything is going great.  The cough is gone\".  She denies dyspnea or wheezing.            Review of Systems:     Review of Systems   Constitutional:  Negative for chills, fever and weight loss.   Respiratory:  Negative for cough, hemoptysis, sputum production, shortness of breath and wheezing.             Physical Examination:     BP (!) 169/76   Pulse 85   Ht 1.689 m (5' 6.5\")   Wt 69.4 kg (153 lb)   SpO2 98%   BMI 24.32 kg/m      Physical Exam  Vitals and nursing note reviewed.   Constitutional:       Appearance: Normal appearance.   Cardiovascular:      Rate and Rhythm: Normal rate and regular rhythm.      Heart sounds: No murmur heard.  Pulmonary:      Effort: Pulmonary effort is normal.      Breath sounds: Normal breath sounds. No wheezing, rhonchi or rales.   Neurological:      Mental Status: She is alert.       Fingernails without clubbing        Data:       CT chest 2/16/2024  1. Stable pulmonary nodules since 2/16/2023. Per Fleischner guidelines  these are considered statistically benign.  2. Resolved mucous plugging in the right lower lobe as well as  resolved groundglass opacity in the left lower lobe      All studies listed here were independently reviewed and interpreted by me today.         Medications:     Current Outpatient Medications   Medication Sig Dispense Refill    allopurinol (ZYLOPRIM) 100 MG tablet Take 1 tablet by mouth once daily 90 tablet 0    amLODIPine (NORVASC) 10 MG tablet Take 1 tablet (10 mg) by mouth daily 90 tablet 0    apixaban ANTICOAGULANT (ELIQUIS) 2.5 MG tablet Take 1 tablet (2.5 mg) by mouth 2 times daily 180 tablet 3    budesonide (PULMICORT) 0.5 MG/2ML neb solution Take 2 mLs (0.5 mg) by nebulization 2 times daily 2 mL 3    calcium carbonate 500 mg, elemental, (OSCAL 500) 1250 (500 Ca) MG TABS tablet Take 1 tablet by mouth daily      clopidogrel (PLAVIX) 75 MG tablet Take 1 tablet (75 mg) by mouth daily 90 tablet 1    " cyanocolbalamin (VITAMIN B-12) 1000 MCG tablet Take 500 mcg by mouth daily As directed      FEROSUL 325 (65 Fe) MG tablet TAKE 1 TABLET BY MOUTH EVERY OTHER DAY 45 tablet 0    fluticasone-salmeterol (ADVAIR) 250-50 MCG/ACT inhaler INHALE 1 DOSE BY MOUTH TWICE DAILY 60 each 8    furosemide (LASIX) 40 MG tablet Take 1 tablet (40 mg) by mouth daily 30 tablet 0    isosorbide mononitrate (ISMO/MONOKET) 20 MG tablet Take 2 tablets (40 mg) by mouth 3 times daily 540 tablet 3    metoprolol tartrate (LOPRESSOR) 50 MG tablet Take 1 tablet (50 mg) by mouth 2 times daily 60 tablet 1    Multiple Vitamins-Minerals (PRESERVISION AREDS 2+MULTI VIT PO) Take by mouth 2 times daily      omeprazole (PRILOSEC) 40 MG DR capsule Take 1 capsule (40 mg) by mouth daily 90 capsule 3    psyllium (METAMUCIL/KONSYL) Packet Take 1 packet by mouth daily 30 each 0    rosuvastatin (CRESTOR) 20 MG tablet Take 1 tablet (20 mg) by mouth daily for 360 days 90 tablet 3    timolol maleate (TIMOPTIC) 0.5 % ophthalmic solution INSTILL 1 DROP INTO EACH EYE ONCE DAILY IN THE MORNING      vitamin D3 25 mcg (1000 units) tablet Take 1 tablet (25 mcg) by mouth every other day 90 tablet 3     No current facility-administered medications for this visit.             The above note was dictated using voice recognition software and may include typographical errors. Please contact the author for any clarifications.

## 2024-04-03 NOTE — LETTER
4/3/2024         RE: Tessa Mansfield  [address]  [facility] [address]        Dear Colleague,    Thank you for referring your patient, Tessa Mansfield, to the [facility] [place]. Please see a copy of my visit note below.    Pulmonology Clinic Follow up Visit       Tessa Mansfield MRN# [id number]   YOB: 1935 Age: 89 year old     Date of Visit: 4/3/2024    Reason for Visit: Chronic cough and probable cough variant asthma          Assessment and Plan:     ## Chronic cough - resolved  ## Probable cough variant asthma  See my previous note from 6/15/2022 for detailed history.  In brief this patient developed chronic cough potentially related to COVID infection which was responsive to prednisone but returned after prednisone was completed.  Extensive evaluation including PFTs and imaging were unrevealing.  She was treated with LABA ICS without success so nebulized Pulmicort was added.  Since starting that her cough is completely resolved.  The Pulmicort was subsequently discontinued without return of the cough, and she was later able to discontinue the Dulera.  At this point it is unclear if her cough was due to an extremely prolonged postviral syndrome versus cough variant asthma with some unknown stimuli.  Given the resolution of her symptoms, she may remain off medication for now, but should keep it on hand in case symptoms return.  -Remain off Pulmicort and Dulera for now  -Keep Dulera on hand to use if cough returns.           ## Pulmonary nodules  Multiple incidental identified subpleural nodules, largest which measures 8 mm.  -Following with pulmonary nodule clinic        Return to clinic: FER Blair M.D.  Pulmonary & Critical Care  Pager: Click Here to page          History of Present Illness / Interval History:     Tessa Mansfield is a 89 year old female with H/O coronary artery disease with pacemaker and history  "of Covid in 2021 who presents to follow-up chronic dry cough present since spring 2021.     Last seen: 9/13/2022    Hospitalized 3/17 - 3/21 for urosepsis and syncope    Seen by interventional pulm on 2/21/2023 for indeterminate pulmonary nodules with plan to repeat CT February 2024    Otherwise she states that \"everything is going great.  The cough is gone\".  She denies dyspnea or wheezing.            Review of Systems:     Review of Systems   Constitutional:  Negative for chills, fever and weight loss.   Respiratory:  Negative for cough, hemoptysis, sputum production, shortness of breath and wheezing.             Physical Examination:     BP (!) 169/76   Pulse 85   Ht 1.689 m (5' 6.5\")   Wt 69.4 kg (153 lb)   SpO2 98%   BMI 24.32 kg/m      Physical Exam  Vitals and nursing note reviewed.   Constitutional:       Appearance: Normal appearance.   Cardiovascular:      Rate and Rhythm: Normal rate and regular rhythm.      Heart sounds: No murmur heard.  Pulmonary:      Effort: Pulmonary effort is normal.      Breath sounds: Normal breath sounds. No wheezing, rhonchi or rales.   Neurological:      Mental Status: She is alert.       Fingernails without clubbing        Data:       CT chest 2/16/2024  1. Stable pulmonary nodules since 2/16/2023. Per Fleischner guidelines  these are considered statistically benign.  2. Resolved mucous plugging in the right lower lobe as well as  resolved groundglass opacity in the left lower lobe      All studies listed here were independently reviewed and interpreted by me today.         Medications:     Current Outpatient Medications   Medication Sig Dispense Refill     allopurinol (ZYLOPRIM) 100 MG tablet Take 1 tablet by mouth once daily 90 tablet 0     amLODIPine (NORVASC) 10 MG tablet Take 1 tablet (10 mg) by mouth daily 90 tablet 0     apixaban ANTICOAGULANT (ELIQUIS) 2.5 MG tablet Take 1 tablet (2.5 mg) by mouth 2 times daily 180 tablet 3     budesonide (PULMICORT) 0.5 MG/2ML " neb solution Take 2 mLs (0.5 mg) by nebulization 2 times daily 2 mL 3     calcium carbonate 500 mg, elemental, (OSCAL 500) 1250 (500 Ca) MG TABS tablet Take 1 tablet by mouth daily       clopidogrel (PLAVIX) 75 MG tablet Take 1 tablet (75 mg) by mouth daily 90 tablet 1     cyanocolbalamin (VITAMIN B-12) 1000 MCG tablet Take 500 mcg by mouth daily As directed       FEROSUL 325 (65 Fe) MG tablet TAKE 1 TABLET BY MOUTH EVERY OTHER DAY 45 tablet 0     fluticasone-salmeterol (ADVAIR) 250-50 MCG/ACT inhaler INHALE 1 DOSE BY MOUTH TWICE DAILY 60 each 8     furosemide (LASIX) 40 MG tablet Take 1 tablet (40 mg) by mouth daily 30 tablet 0     isosorbide mononitrate (ISMO/MONOKET) 20 MG tablet Take 2 tablets (40 mg) by mouth 3 times daily 540 tablet 3     metoprolol tartrate (LOPRESSOR) 50 MG tablet Take 1 tablet (50 mg) by mouth 2 times daily 60 tablet 1     Multiple Vitamins-Minerals (PRESERVISION AREDS 2+MULTI VIT PO) Take by mouth 2 times daily       omeprazole (PRILOSEC) 40 MG DR capsule Take 1 capsule (40 mg) by mouth daily 90 capsule 3     psyllium (METAMUCIL/KONSYL) Packet Take 1 packet by mouth daily 30 each 0     rosuvastatin (CRESTOR) 20 MG tablet Take 1 tablet (20 mg) by mouth daily for 360 days 90 tablet 3     timolol maleate (TIMOPTIC) 0.5 % ophthalmic solution INSTILL 1 DROP INTO EACH EYE ONCE DAILY IN THE MORNING       vitamin D3 25 mcg (1000 units) tablet Take 1 tablet (25 mcg) by mouth every other day 90 tablet 3     No current facility-administered medications for this visit.             The above note was dictated using voice recognition software and may include typographical errors. Please contact the author for any clarifications.        Again, thank you for allowing me to participate in the care of your patient.        Sincerely,        Chad Blair MD

## 2024-04-03 NOTE — NURSING NOTE
Chief Complaint   Patient presents with    RECHECK     Return Pulmonary     Medications reviewed and vital signs taken.   Fazal Madison, ARIS

## 2024-04-04 DIAGNOSIS — M10.00 IDIOPATHIC GOUT, UNSPECIFIED CHRONICITY, UNSPECIFIED SITE: ICD-10-CM

## 2024-04-04 RX ORDER — ALLOPURINOL 100 MG/1
100 TABLET ORAL DAILY
Qty: 90 TABLET | Refills: 0 | Status: SHIPPED | OUTPATIENT
Start: 2024-04-04 | End: 2024-04-08

## 2024-04-04 NOTE — TELEPHONE ENCOUNTER
allopurinol (ZYLOPRIM) 100 MG tablet     90 tablet 0 12/27/2023     Last Office Visit : 11-  Future Office visit:  4-        Gout Agents Protocol [id number]/28/2024 01:33 PM   Protocol Details Has Uric Acid on file in past 12 months and value is less than 6    Has GFR on file in past 12 months and most recent value is normal    Normal serum creatinine on file in the past 12 months       Labs completed on :3-  GFR Estimate  >60 mL/min/1.73m2 19 Low      Creatinine  0.51 - 0.95 mg/dL 2.41     Lab Test 01/02/24  1057   URIC 6.4*

## 2024-04-08 LAB
MDC_IDC_LEAD_CONNECTION_STATUS: NORMAL
MDC_IDC_LEAD_CONNECTION_STATUS: NORMAL
MDC_IDC_LEAD_IMPLANT_DT: NORMAL
MDC_IDC_LEAD_IMPLANT_DT: NORMAL
MDC_IDC_LEAD_LOCATION: NORMAL
MDC_IDC_LEAD_LOCATION: NORMAL
MDC_IDC_LEAD_LOCATION_DETAIL_1: NORMAL
MDC_IDC_LEAD_LOCATION_DETAIL_1: NORMAL
MDC_IDC_LEAD_MFG: NORMAL
MDC_IDC_LEAD_MFG: NORMAL
MDC_IDC_LEAD_MODEL: NORMAL
MDC_IDC_LEAD_MODEL: NORMAL
MDC_IDC_LEAD_POLARITY_TYPE: NORMAL
MDC_IDC_LEAD_POLARITY_TYPE: NORMAL
MDC_IDC_LEAD_SERIAL: NORMAL
MDC_IDC_LEAD_SERIAL: NORMAL
MDC_IDC_LEAD_SPECIAL_FUNCTION: NORMAL
MDC_IDC_LEAD_SPECIAL_FUNCTION: NORMAL
MDC_IDC_MSMT_BATTERY_DTM: NORMAL
MDC_IDC_MSMT_BATTERY_REMAINING_LONGEVITY: 110 MO
MDC_IDC_MSMT_BATTERY_RRT_TRIGGER: 2.62
MDC_IDC_MSMT_BATTERY_STATUS: NORMAL
MDC_IDC_MSMT_BATTERY_VOLTAGE: 3 V
MDC_IDC_MSMT_LEADCHNL_RA_IMPEDANCE_VALUE: 304 OHM
MDC_IDC_MSMT_LEADCHNL_RA_IMPEDANCE_VALUE: 342 OHM
MDC_IDC_MSMT_LEADCHNL_RA_PACING_THRESHOLD_AMPLITUDE: 0.5 V
MDC_IDC_MSMT_LEADCHNL_RA_PACING_THRESHOLD_PULSEWIDTH: 0.4 MS
MDC_IDC_MSMT_LEADCHNL_RA_SENSING_INTR_AMPL: 2.38 MV
MDC_IDC_MSMT_LEADCHNL_RA_SENSING_INTR_AMPL: 2.38 MV
MDC_IDC_MSMT_LEADCHNL_RV_IMPEDANCE_VALUE: 380 OHM
MDC_IDC_MSMT_LEADCHNL_RV_IMPEDANCE_VALUE: 418 OHM
MDC_IDC_MSMT_LEADCHNL_RV_PACING_THRESHOLD_AMPLITUDE: 1 V
MDC_IDC_MSMT_LEADCHNL_RV_PACING_THRESHOLD_PULSEWIDTH: 0.4 MS
MDC_IDC_MSMT_LEADCHNL_RV_SENSING_INTR_AMPL: 8.62 MV
MDC_IDC_MSMT_LEADCHNL_RV_SENSING_INTR_AMPL: 8.62 MV
MDC_IDC_PG_IMPLANT_DTM: NORMAL
MDC_IDC_PG_MFG: NORMAL
MDC_IDC_PG_MODEL: NORMAL
MDC_IDC_PG_SERIAL: NORMAL
MDC_IDC_PG_TYPE: NORMAL
MDC_IDC_SESS_CLINIC_NAME: NORMAL
MDC_IDC_SESS_DTM: NORMAL
MDC_IDC_SESS_TYPE: NORMAL
MDC_IDC_SET_BRADY_AT_MODE_SWITCH_RATE: 171 {BEATS}/MIN
MDC_IDC_SET_BRADY_HYSTRATE: NORMAL
MDC_IDC_SET_BRADY_LOWRATE: 60 {BEATS}/MIN
MDC_IDC_SET_BRADY_MAX_SENSOR_RATE: 130 {BEATS}/MIN
MDC_IDC_SET_BRADY_MAX_TRACKING_RATE: 130 {BEATS}/MIN
MDC_IDC_SET_BRADY_MODE: NORMAL
MDC_IDC_SET_BRADY_PAV_DELAY_LOW: 180 MS
MDC_IDC_SET_BRADY_SAV_DELAY_LOW: 150 MS
MDC_IDC_SET_LEADCHNL_RA_PACING_AMPLITUDE: 1.5 V
MDC_IDC_SET_LEADCHNL_RA_PACING_ANODE_ELECTRODE_1: NORMAL
MDC_IDC_SET_LEADCHNL_RA_PACING_ANODE_LOCATION_1: NORMAL
MDC_IDC_SET_LEADCHNL_RA_PACING_CAPTURE_MODE: NORMAL
MDC_IDC_SET_LEADCHNL_RA_PACING_CATHODE_ELECTRODE_1: NORMAL
MDC_IDC_SET_LEADCHNL_RA_PACING_CATHODE_LOCATION_1: NORMAL
MDC_IDC_SET_LEADCHNL_RA_PACING_POLARITY: NORMAL
MDC_IDC_SET_LEADCHNL_RA_PACING_PULSEWIDTH: 0.4 MS
MDC_IDC_SET_LEADCHNL_RA_SENSING_ANODE_ELECTRODE_1: NORMAL
MDC_IDC_SET_LEADCHNL_RA_SENSING_ANODE_LOCATION_1: NORMAL
MDC_IDC_SET_LEADCHNL_RA_SENSING_CATHODE_ELECTRODE_1: NORMAL
MDC_IDC_SET_LEADCHNL_RA_SENSING_CATHODE_LOCATION_1: NORMAL
MDC_IDC_SET_LEADCHNL_RA_SENSING_POLARITY: NORMAL
MDC_IDC_SET_LEADCHNL_RA_SENSING_SENSITIVITY: 0.3 MV
MDC_IDC_SET_LEADCHNL_RV_PACING_AMPLITUDE: 2 V
MDC_IDC_SET_LEADCHNL_RV_PACING_ANODE_ELECTRODE_1: NORMAL
MDC_IDC_SET_LEADCHNL_RV_PACING_ANODE_LOCATION_1: NORMAL
MDC_IDC_SET_LEADCHNL_RV_PACING_CAPTURE_MODE: NORMAL
MDC_IDC_SET_LEADCHNL_RV_PACING_CATHODE_ELECTRODE_1: NORMAL
MDC_IDC_SET_LEADCHNL_RV_PACING_CATHODE_LOCATION_1: NORMAL
MDC_IDC_SET_LEADCHNL_RV_PACING_POLARITY: NORMAL
MDC_IDC_SET_LEADCHNL_RV_PACING_PULSEWIDTH: 0.4 MS
MDC_IDC_SET_LEADCHNL_RV_SENSING_ANODE_ELECTRODE_1: NORMAL
MDC_IDC_SET_LEADCHNL_RV_SENSING_ANODE_LOCATION_1: NORMAL
MDC_IDC_SET_LEADCHNL_RV_SENSING_CATHODE_ELECTRODE_1: NORMAL
MDC_IDC_SET_LEADCHNL_RV_SENSING_CATHODE_LOCATION_1: NORMAL
MDC_IDC_SET_LEADCHNL_RV_SENSING_POLARITY: NORMAL
MDC_IDC_SET_LEADCHNL_RV_SENSING_SENSITIVITY: 0.9 MV
MDC_IDC_SET_ZONE_DETECTION_INTERVAL: 350 MS
MDC_IDC_SET_ZONE_DETECTION_INTERVAL: 400 MS
MDC_IDC_SET_ZONE_STATUS: NORMAL
MDC_IDC_SET_ZONE_STATUS: NORMAL
MDC_IDC_SET_ZONE_TYPE: NORMAL
MDC_IDC_SET_ZONE_VENDOR_TYPE: NORMAL
MDC_IDC_STAT_AT_BURDEN_PERCENT: 0.1 %
MDC_IDC_STAT_AT_DTM_END: NORMAL
MDC_IDC_STAT_AT_DTM_START: NORMAL
MDC_IDC_STAT_BRADY_AP_VP_PERCENT: 0.04 %
MDC_IDC_STAT_BRADY_AP_VS_PERCENT: 70.25 %
MDC_IDC_STAT_BRADY_AS_VP_PERCENT: 0.03 %
MDC_IDC_STAT_BRADY_AS_VS_PERCENT: 29.68 %
MDC_IDC_STAT_BRADY_DTM_END: NORMAL
MDC_IDC_STAT_BRADY_DTM_START: NORMAL
MDC_IDC_STAT_BRADY_RA_PERCENT_PACED: 70.52 %
MDC_IDC_STAT_BRADY_RV_PERCENT_PACED: 0.07 %
MDC_IDC_STAT_EPISODE_RECENT_COUNT: 0
MDC_IDC_STAT_EPISODE_RECENT_COUNT_DTM_END: NORMAL
MDC_IDC_STAT_EPISODE_RECENT_COUNT_DTM_START: NORMAL
MDC_IDC_STAT_EPISODE_TOTAL_COUNT: 0
MDC_IDC_STAT_EPISODE_TOTAL_COUNT: 6
MDC_IDC_STAT_EPISODE_TOTAL_COUNT_DTM_END: NORMAL
MDC_IDC_STAT_EPISODE_TOTAL_COUNT_DTM_START: NORMAL
MDC_IDC_STAT_EPISODE_TYPE: NORMAL
MDC_IDC_STAT_TACHYTHERAPY_RECENT_DTM_END: NORMAL
MDC_IDC_STAT_TACHYTHERAPY_RECENT_DTM_START: NORMAL
MDC_IDC_STAT_TACHYTHERAPY_TOTAL_DTM_END: NORMAL
MDC_IDC_STAT_TACHYTHERAPY_TOTAL_DTM_START: NORMAL

## 2024-04-08 RX ORDER — ALLOPURINOL 100 MG/1
100 TABLET ORAL DAILY
Qty: 90 TABLET | Refills: 0 | Status: SHIPPED | OUTPATIENT
Start: 2024-04-08 | End: 2024-06-12

## 2024-04-08 NOTE — TELEPHONE ENCOUNTER
Allopurinol 100 MG Oral Tablet   Resent order.    Last order not received.    Uma Pool RN  Central Triage Red Flags/Med Refills

## 2024-04-09 ENCOUNTER — TELEPHONE (OUTPATIENT)
Dept: NEPHROLOGY | Facility: CLINIC | Age: 89
End: 2024-04-09

## 2024-04-09 ASSESSMENT — ENCOUNTER SYMPTOMS
HEMOPTYSIS: 0
SPUTUM PRODUCTION: 0
COUGH: 0
FEVER: 0
WHEEZING: 0
SHORTNESS OF BREATH: 0
CHILLS: 0
WEIGHT LOSS: 0

## 2024-04-09 NOTE — TELEPHONE ENCOUNTER
Spoke with PT to remind her of her upcoming appointment on 04/18/24 at 4:30 pm and the scheduled lab on 04/11/2024 at 3 pm.    Nikolai Retana CMA on 4/9/2024 at 1:03 PM

## 2024-04-11 ENCOUNTER — ANCILLARY PROCEDURE (OUTPATIENT)
Dept: CARDIOLOGY | Facility: CLINIC | Age: 89
DRG: 329 | End: 2024-04-11
Attending: INTERNAL MEDICINE
Payer: COMMERCIAL

## 2024-04-11 ENCOUNTER — LAB (OUTPATIENT)
Dept: LAB | Facility: CLINIC | Age: 89
End: 2024-04-11
Payer: COMMERCIAL

## 2024-04-11 ENCOUNTER — OFFICE VISIT (OUTPATIENT)
Dept: FAMILY MEDICINE | Facility: CLINIC | Age: 89
End: 2024-04-11
Payer: COMMERCIAL

## 2024-04-11 VITALS
DIASTOLIC BLOOD PRESSURE: 66 MMHG | OXYGEN SATURATION: 99 % | BODY MASS INDEX: 24.88 KG/M2 | HEART RATE: 65 BPM | WEIGHT: 156.5 LBS | SYSTOLIC BLOOD PRESSURE: 180 MMHG

## 2024-04-11 DIAGNOSIS — E87.6 HYPOKALEMIA: ICD-10-CM

## 2024-04-11 DIAGNOSIS — Z09 HOSPITAL DISCHARGE FOLLOW-UP: ICD-10-CM

## 2024-04-11 DIAGNOSIS — I10 ESSENTIAL HYPERTENSION: Primary | ICD-10-CM

## 2024-04-11 DIAGNOSIS — F43.9 SITUATIONAL STRESS: ICD-10-CM

## 2024-04-11 DIAGNOSIS — N18.4 CKD (CHRONIC KIDNEY DISEASE) STAGE 4, GFR 15-29 ML/MIN (H): ICD-10-CM

## 2024-04-11 DIAGNOSIS — I49.5 SICK SINUS SYNDROME (H): ICD-10-CM

## 2024-04-11 LAB
ALBUMIN SERPL BCG-MCNC: 4.3 G/DL (ref 3.5–5.2)
ANION GAP SERPL CALCULATED.3IONS-SCNC: 14 MMOL/L (ref 7–15)
BUN SERPL-MCNC: 26.6 MG/DL (ref 8–23)
CALCIUM SERPL-MCNC: 9 MG/DL (ref 8.8–10.2)
CHLORIDE SERPL-SCNC: 106 MMOL/L (ref 98–107)
CREAT SERPL-MCNC: 1.75 MG/DL (ref 0.51–0.95)
DEPRECATED HCO3 PLAS-SCNC: 26 MMOL/L (ref 22–29)
EGFRCR SERPLBLD CKD-EPI 2021: 28 ML/MIN/1.73M2
GLUCOSE SERPL-MCNC: 84 MG/DL (ref 70–99)
HGB BLD-MCNC: 9.6 G/DL (ref 11.7–15.7)
IRON BINDING CAPACITY (ROCHE): 350 UG/DL (ref 240–430)
IRON SATN MFR SERPL: 7 % (ref 15–46)
IRON SERPL-MCNC: 23 UG/DL (ref 37–145)
PHOSPHATE SERPL-MCNC: 2.6 MG/DL (ref 2.5–4.5)
POTASSIUM SERPL-SCNC: 3.2 MMOL/L (ref 3.4–5.3)
SODIUM SERPL-SCNC: 146 MMOL/L (ref 135–145)
VIT D+METAB SERPL-MCNC: 62 NG/ML (ref 20–50)

## 2024-04-11 PROCEDURE — 83550 IRON BINDING TEST: CPT | Performed by: PATHOLOGY

## 2024-04-11 PROCEDURE — G2211 COMPLEX E/M VISIT ADD ON: HCPCS | Performed by: FAMILY MEDICINE

## 2024-04-11 PROCEDURE — 85018 HEMOGLOBIN: CPT | Performed by: PATHOLOGY

## 2024-04-11 PROCEDURE — 99214 OFFICE O/P EST MOD 30 MIN: CPT | Performed by: FAMILY MEDICINE

## 2024-04-11 PROCEDURE — 82306 VITAMIN D 25 HYDROXY: CPT | Performed by: INTERNAL MEDICINE

## 2024-04-11 PROCEDURE — 83540 ASSAY OF IRON: CPT | Performed by: PATHOLOGY

## 2024-04-11 PROCEDURE — 93294 REM INTERROG EVL PM/LDLS PM: CPT | Performed by: INTERNAL MEDICINE

## 2024-04-11 PROCEDURE — 36415 COLL VENOUS BLD VENIPUNCTURE: CPT | Performed by: PATHOLOGY

## 2024-04-11 PROCEDURE — 99000 SPECIMEN HANDLING OFFICE-LAB: CPT | Performed by: PATHOLOGY

## 2024-04-11 PROCEDURE — 80069 RENAL FUNCTION PANEL: CPT | Performed by: PATHOLOGY

## 2024-04-11 RX ORDER — AMLODIPINE BESYLATE 10 MG/1
10 TABLET ORAL DAILY
Qty: 90 TABLET | Refills: 3 | Status: ON HOLD | OUTPATIENT
Start: 2024-04-11 | End: 2024-08-15

## 2024-04-11 RX ORDER — RESPIRATORY SYNCYTIAL VIRUS VACCINE 120MCG/0.5
0.5 KIT INTRAMUSCULAR ONCE
Qty: 1 EACH | Refills: 0 | Status: CANCELLED | OUTPATIENT
Start: 2024-04-11 | End: 2024-04-11

## 2024-04-11 RX ORDER — POTASSIUM CHLORIDE 750 MG/1
10 TABLET, EXTENDED RELEASE ORAL DAILY
Qty: 90 TABLET | Refills: 3 | Status: SHIPPED | OUTPATIENT
Start: 2024-04-11 | End: 2024-06-06

## 2024-04-11 NOTE — PROGRESS NOTES
We discussed her need for HHN due to weakness, inability to care for self or easily leave home  Pedro Gomez MD    Assessment & Plan     Essential hypertension    - amLODIPine (NORVASC) 10 MG tablet; Take 1 tablet (10 mg) by mouth daily    Hypokalemia  Resume  - potassium chloride ER (K-TAB/KLOR-CON) 10 MEQ CR tablet; Take 1 tablet (10 mEq) by mouth daily    Hospital discharge follow-up  Discussed in detail    Situational stress  Reviewed, has good support, can reach out to me    No cardio sx; at her reuquest I've messaged cardio provider to disucss w/ her if ok to do dental soon    36 minutes spent by me on the date of the encounter doing chart review, history and exam, documentation and further activities per the note    MED REC REQUIRED{  Post Medication Reconciliation Status:     The longitudinal plan of care for the diagnosis(es)/condition(s) as documented were addressed during this visit. Due to the added complexity in care, I will continue to support Tessa in the subsequent management and with ongoing continuity of care.      No follow-ups on file.    Subjective   Tessa is a 89 year old, presenting for the following health issues:  Hospital F/U (Bladder/kidney infection./Medication review. Potassium and hydralazine were stopped. Changes to metoprolol (100 mg to 50 mg) and amlodipine(10  mg to 5 mg)./High BP.)      4/11/2024     3:29 PM   Additional Questions   Roomed by KTR     HPI   1-inpt follow-up; recovered from urosepsis sx  2-stress/grief; she has a son in his upper fifties, new dx ALS, large supportive family  3-shots: [facility]  4-ckd; better today  5-bp worse, will go up from five mg a day to ten mg a day norvasc, sees renal MD in a week  6-low K; not taking K since home, was on before inpt stay. Will resume ten meq a day now, sees renal MD next week  7-She is not having any new medical issues since home except the dx of her son   8-no cardio sx  (h/o CAD)  9-no pulm sx beyond chronic cough, see recent pulm note, diagnosed as cough variant asthma  10-gout, no recent flares  Past Medical History:   Diagnosis Date    CAD (coronary artery disease)     CAD s/p PCI+BMS to MRCA 10/2002, PCI to mLCX 2/2003, PCI+DESx2 to pLAD 11/2007, PCI+DESx1 to dRCA 12/2007, PCI+ALVAREZ to RPDA 7/2013; on indefinite DAPT  10/27/2002    10/27/2002: AMI s/p PCI+BMS (3.5x18mm Bx velocity) to mRCA 2/5/2003: Back pain; PCI+stent to mLCX c/b distal embolization/slow flow 4/11/2003: Unstable angina; PCI+stent (Hepacoat velocity) to mLAD 11/27/2007: PCI+DESx2 to pLAD (IVUS w/ calcification of LMCA and ulcerated plaque pLAD, 80% dRCA; also had 80% dLCX, too small to stent) 12/11/2007: Staged PCI. PCI+DESx1 (3.5x13mm Cypher) to dRCA (indefinite DAPT recommended at this time) 6/27/2008: Angina. mLCX stent 70% ISR. LAD and RCA stents patent. Myocardium at risk from LCX felt to be small, medical management preferred to PCI. 11/10/2009: Angina. Findings unchanged from 6/27/2008, FFR LAD 0.90. Medical management recommended. 7/23/2013: Unstable angina; PCI+ALVAREZ to mPDA. Diagnostic findings: LMCA 40% distal. LAD: pLAD stents patent mLAD 30% ISR dLAD 50% diffuse, D2 diffuse disease. LCX 80% mid ISR. RCA diffuse <30% mid, RPLAs diffuse disease, RPDA 100% occlusion.      Cardiac Pacemaker- Medtronic, dual chamber- NOT dependant 11/28/2007    CKD (chronic kidney disease), stage IV (H)     Hyperlipidemia LDL goal <70     Hypertension     Stented coronary artery 10-    RCA    Stented coronary artery 2-5-2003    LCx    Stented coronary artery 4-    LAD    Stented coronary artery 11-    LAD    Stented coronary artery 12-    RCA    Transient complete heart block (H) 11/28/2007     Past Surgical History:   Procedure Laterality Date    CHOLECYSTECTOMY      CV CORONARY ANGIOGRAM N/A 6/17/2022    Procedure: Coronary Angiogram;  Surgeon: Constantine Macias MD;  Location: [facility]  CARDIAC CATH LAB    CV CORONARY ANGIOGRAM N/A 7/17/2023    Procedure: Coronary Angiogram;  Surgeon: Constantine Macias MD;  Location: [facility] CARDIAC CATH LAB    CV PCI N/A 6/17/2022    Procedure: Percutaneous Coronary Intervention;  Surgeon: Constantine Macias MD;  Location: [facility] CARDIAC CATH LAB    CV PCI N/A 7/17/2023    Procedure: Percutaneous Coronary Intervention;  Surgeon: Constantine Macias MD;  Location: [facility] CARDIAC CATH LAB    CV PCI N/A 11/6/2023    Procedure: Percutaneous Coronary Intervention;  Surgeon: Constantine Macias MD;  Location: [facility] CARDIAC CATH LAB    CV RIGHT HEART CATH MEASUREMENTS RECORDED N/A 6/17/2022    Procedure: Right Heart Catheterization;  Surgeon: Constantine Macias MD;  Location: [facility] CARDIAC CATH LAB    EP PACEMAKER N/A 10/15/2019    Procedure: EP PACEMAKER;  Surgeon: Anthony Zhu MD;  Location: [facility] CARDIAC CATH LAB    ESOPHAGOSCOPY, GASTROSCOPY, DUODENOSCOPY (EGD), COMBINED N/A 7/20/2023    Procedure: Esophagoscopy, gastroscopy, duodenoscopy (EGD), combined;  Surgeon: Bekah Dash DO;  Location:  GI    HC PPM INSERTION NEW/REPLACEMENT W/ ATRIAL&VENTRICULAR LEAD  11-    HYSTERECTOMY       Current Outpatient Medications   Medication Sig Dispense Refill    allopurinol (ZYLOPRIM) 100 MG tablet Take 1 tablet (100 mg) by mouth daily 90 tablet 0    amLODIPine (NORVASC) 10 MG tablet Take 1 tablet (10 mg) by mouth daily 90 tablet 3    amLODIPine (NORVASC) 10 MG tablet Take 1 tablet (10 mg) by mouth daily (Patient taking differently: Take 5 mg by mouth daily) 90 tablet 0    apixaban ANTICOAGULANT (ELIQUIS) 2.5 MG tablet Take 1 tablet (2.5 mg) by mouth 2 times daily 180 tablet 3    budesonide (PULMICORT) 0.5 MG/2ML neb solution Take 2 mLs (0.5 mg) by nebulization 2 times daily 2 mL 3    calcium carbonate 500 mg, elemental, (OSCAL 500) 1250 (500 Ca) MG TABS tablet Take 1 tablet by mouth daily      clopidogrel (PLAVIX) 75 MG  "tablet Take 1 tablet (75 mg) by mouth daily 90 tablet 1    cyanocolbalamin (VITAMIN B-12) 1000 MCG tablet Take 500 mcg by mouth daily As directed      FEROSUL 325 (65 Fe) MG tablet TAKE 1 TABLET BY MOUTH EVERY OTHER DAY 45 tablet 0    fluticasone-salmeterol (ADVAIR) 250-50 MCG/ACT inhaler INHALE 1 DOSE BY MOUTH TWICE DAILY 60 each 8    furosemide (LASIX) 40 MG tablet Take 1 tablet (40 mg) by mouth daily 30 tablet 0    isosorbide mononitrate (ISMO/MONOKET) 20 MG tablet Take 2 tablets (40 mg) by mouth 3 times daily 540 tablet 3    metoprolol tartrate (LOPRESSOR) 50 MG tablet Take 1 tablet (50 mg) by mouth 2 times daily 60 tablet 1    Multiple Vitamins-Minerals (PRESERVISION AREDS 2+MULTI VIT PO) Take by mouth 2 times daily      omeprazole (PRILOSEC) 40 MG DR capsule Take 1 capsule (40 mg) by mouth daily 90 capsule 3    potassium chloride ER (K-TAB/KLOR-CON) 10 MEQ CR tablet Take 1 tablet (10 mEq) by mouth daily 90 tablet 3    psyllium (METAMUCIL/KONSYL) Packet Take 1 packet by mouth daily 30 each 0    rosuvastatin (CRESTOR) 20 MG tablet Take 1 tablet (20 mg) by mouth daily for 360 days 90 tablet 3    timolol maleate (TIMOPTIC) 0.5 % ophthalmic solution INSTILL 1 DROP INTO EACH EYE ONCE DAILY IN THE MORNING      vitamin D3 25 mcg (1000 units) tablet Take 1 tablet (25 mcg) by mouth every other day 90 tablet 3     No current facility-administered medications for this visit.     Allergies   Allergen Reactions    Ciprofloxacin Rash    Codeine Sulfate Itching    Shrimp Swelling    Bactrim [Sulfamethoxazole W/Trimethoprim]      Patient unable to recall    Biaxin [Clarithromycin]     Chlorthalidone Nausea and Vomiting    Clonidine     Darvon [Propoxyphene Hcl]     Dilaudid [Hydromorphone] Visual Disturbance and Hallucination    Gabapentin Fatigue and Confusion     \"felt drunk\"    Indomethacin     Levaquin [Levofloxacin Hemihydrate]     Morphine Sulfate     Percocet [Oxycodone-Acetaminophen] Hallucination    Pregabalin Itching " and Fatigue    Simvastatin Palpitations     Muscle weakness, leg cramping      Spironolactone      Dehydrated      Terazosin      Family History   Problem Relation Age of Onset    Cancer Sister 82        bladder cancer     Social History     Socioeconomic History    Marital status:      Spouse name: Not on file    Number of children: 4    Years of education: Not on file    Highest education level: Not on file   Occupational History     Employer: [employer]   Tobacco Use    Smoking status: Former     Current packs/day: 0.30     Average packs/day: 0.3 packs/day for 15.0 years (4.5 ttl pk-yrs)     Types: Cigarettes    Smokeless tobacco: Never    Tobacco comments:     Quit 35+ years ago   Substance and Sexual Activity    Alcohol use: Not on file    Drug use: No    Sexual activity: Not Currently     Partners: Male     Birth control/protection: Post-menopausal   Other Topics Concern     Service Not Asked    Blood Transfusions Yes    Caffeine Concern Not Asked    Occupational Exposure Not Asked    Hobby Hazards Not Asked    Sleep Concern Not Asked    Stress Concern Not Asked    Weight Concern Not Asked    Special Diet Not Asked    Back Care Not Asked    Exercise No    Bike Helmet Not Asked    Seat Belt Not Asked    Self-Exams Not Asked    Parent/sibling w/ CABG, MI or angioplasty before 65F 55M? Not Asked   Social History Narrative    Not on file     Social Determinants of Health     Financial Resource Strain: Low Risk  (11/15/2023)    Financial Resource Strain     Within the past 12 months, have you or your family members you live with been unable to get utilities (heat, electricity) when it was really needed?: No   Food Insecurity: Low Risk  (11/15/2023)    Food Insecurity     Within the past 12 months, did you worry that your food would run out before you got money to buy more?: No     Within the past 12 months, did the food you bought just not last and you didn t have money to get more?: No    Transportation Needs: Low Risk  (11/15/2023)    Transportation Needs     Within the past 12 months, has lack of transportation kept you from medical appointments, getting your medicines, non-medical meetings or appointments, work, or from getting things that you need?: No   Physical Activity: Not on file   Stress: Not on file   Social Connections: Not on file   Interpersonal Safety: Low Risk  (11/15/2023)    Interpersonal Safety     Do you feel physically and emotionally safe where you currently live?: Yes     Within the past 12 months, have you been hit, slapped, kicked or otherwise physically hurt by someone?: No     Within the past 12 months, have you been humiliated or emotionally abused in other ways by your partner or ex-partner?: No   Housing Stability: Low Risk  (11/15/2023)    Housing Stability     Do you have housing? : Yes     Are you worried about losing your housing?: No             Objective    BP (!) 180/66 (BP Location: Right arm, Patient Position: Sitting, Cuff Size: Adult Regular)   Pulse 65   Wt 71 kg (156 lb 8 oz)   SpO2 99%   BMI 24.88 kg/m    Body mass index is 24.88 kg/m .  Physical Exam   GENERAL: alert and no distress  MS: no gross musculoskeletal defects noted, no edema  Tearful appropriately describing son's illness            Signed Electronically by: Pedro Gomez MD

## 2024-04-12 ENCOUNTER — TELEPHONE (OUTPATIENT)
Dept: INTERNAL MEDICINE | Facility: CLINIC | Age: 89
End: 2024-04-12

## 2024-04-12 ENCOUNTER — LAB (OUTPATIENT)
Dept: LAB | Facility: CLINIC | Age: 89
End: 2024-04-12
Payer: COMMERCIAL

## 2024-04-12 DIAGNOSIS — N18.4 CKD (CHRONIC KIDNEY DISEASE) STAGE 4, GFR 15-29 ML/MIN (H): ICD-10-CM

## 2024-04-12 LAB
ALBUMIN MFR UR ELPH: 68.9 MG/DL
CREAT UR-MCNC: 55.2 MG/DL
PROT/CREAT 24H UR: 1.25 MG/MG CR (ref 0–0.2)

## 2024-04-12 PROCEDURE — 84156 ASSAY OF PROTEIN URINE: CPT

## 2024-04-12 NOTE — TELEPHONE ENCOUNTER
Left a detailed message to the pt and informed this matter.    Soon-Mi  -------------------------------------------------------------------------------------------------------      ----- Message from Pedro Gomez MD sent at 4/12/2024  1:51 PM CDT -----  Can you let her know  Ok do dental now  ----- Message -----  From: Randa Ann NP  Sent: 4/12/2024   7:58 AM CDT  To: Pedro Gomez MD    She can go ahead. If needed she can hold DOAC, should continue plavix. Thanks  ----- Message -----  From: Pedro Gomez MD  Sent: 4/11/2024   4:14 PM CDT  To: Randa Ann NP    Hi, I am her primary  Seeing her now  Doing well  Stent last Nov  Needs dental  She wonders can she just do or need to wait? Re: stent  Dionisio Phillips

## 2024-04-13 ENCOUNTER — ANESTHESIA EVENT (OUTPATIENT)
Dept: SURGERY | Facility: CLINIC | Age: 89
DRG: 329 | End: 2024-04-13
Payer: COMMERCIAL

## 2024-04-13 ENCOUNTER — ANESTHESIA (OUTPATIENT)
Dept: SURGERY | Facility: CLINIC | Age: 89
DRG: 329 | End: 2024-04-13
Payer: COMMERCIAL

## 2024-04-13 ENCOUNTER — APPOINTMENT (OUTPATIENT)
Dept: GENERAL RADIOLOGY | Facility: CLINIC | Age: 89
DRG: 329 | End: 2024-04-13
Payer: COMMERCIAL

## 2024-04-13 ENCOUNTER — APPOINTMENT (OUTPATIENT)
Dept: CT IMAGING | Facility: CLINIC | Age: 89
DRG: 329 | End: 2024-04-13
Attending: EMERGENCY MEDICINE
Payer: COMMERCIAL

## 2024-04-13 ENCOUNTER — HOSPITAL ENCOUNTER (INPATIENT)
Facility: CLINIC | Age: 89
LOS: 9 days | Discharge: NURSING, REHAB OR LONG-TERM CARE | DRG: 329 | End: 2024-04-22
Attending: EMERGENCY MEDICINE | Admitting: SURGERY
Payer: COMMERCIAL

## 2024-04-13 ENCOUNTER — MEDICAL CORRESPONDENCE (OUTPATIENT)
Dept: HEALTH INFORMATION MANAGEMENT | Facility: CLINIC | Age: 89
End: 2024-04-13

## 2024-04-13 DIAGNOSIS — K56.609 SMALL BOWEL OBSTRUCTION (H): ICD-10-CM

## 2024-04-13 DIAGNOSIS — I10 ESSENTIAL HYPERTENSION: ICD-10-CM

## 2024-04-13 DIAGNOSIS — G89.18 ACUTE POST-OPERATIVE PAIN: Primary | ICD-10-CM

## 2024-04-13 LAB
ABO/RH(D): NORMAL
ACANTHOCYTES BLD QL SMEAR: ABNORMAL
ALBUMIN SERPL BCG-MCNC: 2.9 G/DL (ref 3.5–5.2)
ALBUMIN SERPL BCG-MCNC: 4.5 G/DL (ref 3.5–5.2)
ALLEN'S TEST: ABNORMAL
ALP SERPL-CCNC: 44 U/L (ref 40–150)
ALP SERPL-CCNC: 84 U/L (ref 40–150)
ALT SERPL W P-5'-P-CCNC: 13 U/L (ref 0–50)
ALT SERPL W P-5'-P-CCNC: 30 U/L (ref 0–50)
ANION GAP SERPL CALCULATED.3IONS-SCNC: 15 MMOL/L (ref 7–15)
ANION GAP SERPL CALCULATED.3IONS-SCNC: 16 MMOL/L (ref 7–15)
ANION GAP SERPL CALCULATED.3IONS-SCNC: 17 MMOL/L (ref 7–15)
ANION GAP SERPL CALCULATED.3IONS-SCNC: 17 MMOL/L (ref 7–15)
ANTIBODY SCREEN: NEGATIVE
APTT PPP: 42 SECONDS (ref 22–38)
AST SERPL W P-5'-P-CCNC: 29 U/L (ref 0–45)
AST SERPL W P-5'-P-CCNC: 69 U/L (ref 0–45)
ATRIAL RATE - MUSE: 65 BPM
AUER BODIES BLD QL SMEAR: ABNORMAL
BASE EXCESS BLDA CALC-SCNC: -3.5 MMOL/L (ref -3–3)
BASE EXCESS BLDA CALC-SCNC: -7.7 MMOL/L (ref -3–3)
BASO STIPL BLD QL SMEAR: ABNORMAL
BASOPHILS # BLD AUTO: 0 10E3/UL (ref 0–0.2)
BASOPHILS # BLD AUTO: ABNORMAL 10*3/UL
BASOPHILS # BLD MANUAL: 0 10E3/UL (ref 0–0.2)
BASOPHILS NFR BLD AUTO: 0 %
BASOPHILS NFR BLD AUTO: ABNORMAL %
BASOPHILS NFR BLD MANUAL: 1 %
BILIRUB SERPL-MCNC: 0.3 MG/DL
BILIRUB SERPL-MCNC: 0.4 MG/DL
BITE CELLS BLD QL SMEAR: ABNORMAL
BLD PROD TYP BPU: NORMAL
BLISTER CELLS BLD QL SMEAR: ABNORMAL
BLOOD COMPONENT TYPE: NORMAL
BUN SERPL-MCNC: 22.5 MG/DL (ref 8–23)
BUN SERPL-MCNC: 22.5 MG/DL (ref 8–23)
BUN SERPL-MCNC: 26 MG/DL (ref 8–23)
BUN SERPL-MCNC: 30.2 MG/DL (ref 8–23)
BURR CELLS BLD QL SMEAR: SLIGHT
BURR CELLS BLD QL SMEAR: SLIGHT
CA-I BLD-MCNC: 3.9 MG/DL (ref 4.4–5.2)
CA-I BLD-MCNC: 4.4 MG/DL (ref 4.4–5.2)
CA-I BLD-MCNC: 4.4 MG/DL (ref 4.4–5.2)
CALCIUM SERPL-MCNC: 7.7 MG/DL (ref 8.8–10.2)
CALCIUM SERPL-MCNC: 8 MG/DL (ref 8.8–10.2)
CALCIUM SERPL-MCNC: 8 MG/DL (ref 8.8–10.2)
CALCIUM SERPL-MCNC: 9.5 MG/DL (ref 8.8–10.2)
CF REDUC 60M P MA LENFR BLD TEG: 0 % (ref 0–15)
CFT BLD TEG: 2.2 MINUTE (ref 1–3)
CHLORIDE SERPL-SCNC: 105 MMOL/L (ref 98–107)
CHLORIDE SERPL-SCNC: 107 MMOL/L (ref 98–107)
CHLORIDE SERPL-SCNC: 107 MMOL/L (ref 98–107)
CHLORIDE SERPL-SCNC: 109 MMOL/L (ref 98–107)
CI (COAGULATION INDEX)(Z) NON NATIVE: -1.3 (ref -3–3)
CLOT ANGLE BLD TEG: 60.2 DEGREES (ref 53–72)
CLOT INIT BLD TEG: 5.7 MINUTE (ref 5–10)
CLOT INIT KAOL IND TO POST HEP NEUT TRTO: 1 {RATIO}
CLOT INIT KAOLIN IND BLD US: 175 SEC (ref 113–166)
CLOT INIT KAOLIN IND P HEP NEUT BLD US: 172 SEC (ref 103–153)
CLOT LYSIS 30M P MA LENFR BLD TEG: 0 % (ref 0–8)
CLOT STIFF PLT CONT BLD CALC: 2 HPA (ref 11.9–29.8)
CLOT STIFF TF IND P HEP NEUT BLD US: 3.5 HPA (ref 13–33.2)
CLOT STIFF TF IND+IIB-IIIA INH P HEP NEU: 1.5 HPA (ref 1–3.7)
CLOT STRENGTH BLD TEG: 5.6 KD/SC (ref 4.5–11)
CODING SYSTEM: NORMAL
COHGB MFR BLD: 99.1 % (ref 95–96)
CREAT SERPL-MCNC: 1.59 MG/DL (ref 0.51–0.95)
CREAT SERPL-MCNC: 1.59 MG/DL (ref 0.51–0.95)
CREAT SERPL-MCNC: 1.73 MG/DL (ref 0.51–0.95)
CREAT SERPL-MCNC: 1.81 MG/DL (ref 0.51–0.95)
CROSSMATCH: NORMAL
DACRYOCYTES BLD QL SMEAR: ABNORMAL
DACRYOCYTES BLD QL SMEAR: SLIGHT
DEPRECATED HCO3 PLAS-SCNC: 19 MMOL/L (ref 22–29)
DEPRECATED HCO3 PLAS-SCNC: 20 MMOL/L (ref 22–29)
DEPRECATED HCO3 PLAS-SCNC: 20 MMOL/L (ref 22–29)
DEPRECATED HCO3 PLAS-SCNC: 24 MMOL/L (ref 22–29)
DIASTOLIC BLOOD PRESSURE - MUSE: NORMAL MMHG
EGFRCR SERPLBLD CKD-EPI 2021: 27 ML/MIN/1.73M2
EGFRCR SERPLBLD CKD-EPI 2021: 28 ML/MIN/1.73M2
EGFRCR SERPLBLD CKD-EPI 2021: 31 ML/MIN/1.73M2
EGFRCR SERPLBLD CKD-EPI 2021: 31 ML/MIN/1.73M2
ELLIPTOCYTES BLD QL SMEAR: ABNORMAL
EOSINOPHIL # BLD AUTO: 0 10E3/UL (ref 0–0.7)
EOSINOPHIL # BLD AUTO: ABNORMAL 10*3/UL
EOSINOPHIL # BLD MANUAL: 0.1 10E3/UL (ref 0–0.7)
EOSINOPHIL NFR BLD AUTO: 0 %
EOSINOPHIL NFR BLD AUTO: ABNORMAL %
EOSINOPHIL NFR BLD MANUAL: 3 %
ERYTHROCYTE [DISTWIDTH] IN BLOOD BY AUTOMATED COUNT: 15.1 % (ref 10–15)
ERYTHROCYTE [DISTWIDTH] IN BLOOD BY AUTOMATED COUNT: 15.4 % (ref 10–15)
ERYTHROCYTE [DISTWIDTH] IN BLOOD BY AUTOMATED COUNT: 15.5 % (ref 10–15)
ERYTHROCYTE [DISTWIDTH] IN BLOOD BY AUTOMATED COUNT: 16.8 % (ref 10–15)
ERYTHROCYTE [DISTWIDTH] IN BLOOD BY AUTOMATED COUNT: 17.2 % (ref 10–15)
FIBRINOGEN PPP-MCNC: 161 MG/DL (ref 170–490)
FRAGMENTS BLD QL SMEAR: ABNORMAL
GLUCOSE BLD-MCNC: 194 MG/DL (ref 70–99)
GLUCOSE BLDC GLUCOMTR-MCNC: 116 MG/DL (ref 70–99)
GLUCOSE BLDC GLUCOMTR-MCNC: 127 MG/DL (ref 70–99)
GLUCOSE BLDC GLUCOMTR-MCNC: 153 MG/DL (ref 70–99)
GLUCOSE SERPL-MCNC: 124 MG/DL (ref 70–99)
GLUCOSE SERPL-MCNC: 124 MG/DL (ref 70–99)
GLUCOSE SERPL-MCNC: 209 MG/DL (ref 70–99)
GLUCOSE SERPL-MCNC: 94 MG/DL (ref 70–99)
HCO3 BLD-SCNC: 22 MMOL/L (ref 21–28)
HCO3 BLDA-SCNC: 18 MMOL/L (ref 21–28)
HCT VFR BLD AUTO: 17.5 % (ref 35–47)
HCT VFR BLD AUTO: 27.5 % (ref 35–47)
HCT VFR BLD AUTO: 27.8 % (ref 35–47)
HCT VFR BLD AUTO: 28.5 % (ref 35–47)
HCT VFR BLD AUTO: 35 % (ref 35–47)
HGB BLD-MCNC: 10.8 G/DL (ref 11.7–15.7)
HGB BLD-MCNC: 3.8 G/DL (ref 11.7–15.7)
HGB BLD-MCNC: 5.2 G/DL (ref 11.7–15.7)
HGB BLD-MCNC: 9.2 G/DL (ref 11.7–15.7)
HGB BLD-MCNC: 9.3 G/DL (ref 11.7–15.7)
HGB BLD-MCNC: 9.4 G/DL (ref 11.7–15.7)
HGB C CRYSTALS: ABNORMAL
HOWELL-JOLLY BOD BLD QL SMEAR: ABNORMAL
IMM GRANULOCYTES # BLD: 0.2 10E3/UL
IMM GRANULOCYTES # BLD: ABNORMAL 10*3/UL
IMM GRANULOCYTES NFR BLD: 1 %
IMM GRANULOCYTES NFR BLD: ABNORMAL %
INR PPP: 1.81 (ref 0.85–1.15)
INTERPRETATION ECG - MUSE: NORMAL
ISSUE DATE AND TIME: NORMAL
LACTATE BLD-SCNC: 7.4 MMOL/L (ref 0.7–2)
LACTATE SERPL-SCNC: 1.1 MMOL/L (ref 0.7–2)
LACTATE SERPL-SCNC: 1.5 MMOL/L (ref 0.7–2)
LACTATE SERPL-SCNC: 1.5 MMOL/L (ref 0.7–2)
LACTATE SERPL-SCNC: 4.8 MMOL/L (ref 0.7–2)
LIPASE SERPL-CCNC: 69 U/L (ref 13–60)
LYMPHOCYTES # BLD AUTO: 0.5 10E3/UL (ref 0.8–5.3)
LYMPHOCYTES # BLD AUTO: ABNORMAL 10*3/UL
LYMPHOCYTES # BLD MANUAL: 2.5 10E3/UL (ref 0.8–5.3)
LYMPHOCYTES NFR BLD AUTO: 4 %
LYMPHOCYTES NFR BLD AUTO: ABNORMAL %
LYMPHOCYTES NFR BLD MANUAL: 51 %
MAGNESIUM SERPL-MCNC: 1.8 MG/DL (ref 1.7–2.3)
MAGNESIUM SERPL-MCNC: 1.9 MG/DL (ref 1.7–2.3)
MCF BLD TEG: 52.9 MM (ref 50–70)
MCH RBC QN AUTO: 27.7 PG (ref 26.5–33)
MCH RBC QN AUTO: 27.8 PG (ref 26.5–33)
MCH RBC QN AUTO: 29.1 PG (ref 26.5–33)
MCH RBC QN AUTO: 29.3 PG (ref 26.5–33)
MCH RBC QN AUTO: 29.4 PG (ref 26.5–33)
MCHC RBC AUTO-ENTMCNC: 29.7 G/DL (ref 31.5–36.5)
MCHC RBC AUTO-ENTMCNC: 30.9 G/DL (ref 31.5–36.5)
MCHC RBC AUTO-ENTMCNC: 32.6 G/DL (ref 31.5–36.5)
MCHC RBC AUTO-ENTMCNC: 33.5 G/DL (ref 31.5–36.5)
MCHC RBC AUTO-ENTMCNC: 33.8 G/DL (ref 31.5–36.5)
MCV RBC AUTO: 87 FL (ref 78–100)
MCV RBC AUTO: 87 FL (ref 78–100)
MCV RBC AUTO: 90 FL (ref 78–100)
MCV RBC AUTO: 90 FL (ref 78–100)
MCV RBC AUTO: 93 FL (ref 78–100)
MONOCYTES # BLD AUTO: 2 10E3/UL (ref 0–1.3)
MONOCYTES # BLD AUTO: ABNORMAL 10*3/UL
MONOCYTES # BLD MANUAL: 0.8 10E3/UL (ref 0–1.3)
MONOCYTES NFR BLD AUTO: 14 %
MONOCYTES NFR BLD AUTO: ABNORMAL %
MONOCYTES NFR BLD MANUAL: 16 %
NEUTROPHILS # BLD AUTO: 11.3 10E3/UL (ref 1.6–8.3)
NEUTROPHILS # BLD AUTO: ABNORMAL 10*3/UL
NEUTROPHILS # BLD MANUAL: 1.4 10E3/UL (ref 1.6–8.3)
NEUTROPHILS NFR BLD AUTO: 81 %
NEUTROPHILS NFR BLD AUTO: ABNORMAL %
NEUTROPHILS NFR BLD MANUAL: 29 %
NEUTS HYPERSEG BLD QL SMEAR: ABNORMAL
NRBC # BLD AUTO: 0 10E3/UL
NRBC # BLD AUTO: 0 10E3/UL
NRBC BLD AUTO-RTO: 0 /100
NRBC BLD AUTO-RTO: 0 /100
O2/TOTAL GAS SETTING VFR VENT: 50 %
O2/TOTAL GAS SETTING VFR VENT: 85 %
OXYHGB MFR BLDA: 97 % (ref 92–100)
P AXIS - MUSE: 77 DEGREES
PCO2 BLD: 39 MM HG (ref 35–45)
PCO2 BLDA: 35 MM HG (ref 35–45)
PH BLD: 7.36 [PH] (ref 7.35–7.45)
PH BLDA: 7.31 [PH] (ref 7.35–7.45)
PHOSPHATE SERPL-MCNC: 3.4 MG/DL (ref 2.5–4.5)
PLAT MORPH BLD: ABNORMAL
PLAT MORPH BLD: ABNORMAL
PLATELET # BLD AUTO: 31 10E3/UL (ref 150–450)
PLATELET # BLD AUTO: 36 10E3/UL (ref 150–450)
PLATELET # BLD AUTO: 40 10E3/UL (ref 150–450)
PLATELET # BLD AUTO: 61 10E3/UL (ref 150–450)
PLATELET # BLD AUTO: 77 10E3/UL (ref 150–450)
PO2 BLD: 113 MM HG (ref 80–105)
PO2 BLDA: 362 MM HG (ref 80–105)
POLYCHROMASIA BLD QL SMEAR: SLIGHT
POLYCHROMASIA BLD QL SMEAR: SLIGHT
POTASSIUM BLD-SCNC: 4 MMOL/L (ref 3.4–5.3)
POTASSIUM SERPL-SCNC: 3.2 MMOL/L (ref 3.4–5.3)
POTASSIUM SERPL-SCNC: 3.7 MMOL/L (ref 3.4–5.3)
POTASSIUM SERPL-SCNC: 3.8 MMOL/L (ref 3.4–5.3)
POTASSIUM SERPL-SCNC: 3.8 MMOL/L (ref 3.4–5.3)
PR INTERVAL - MUSE: 234 MS
PROT SERPL-MCNC: 4.2 G/DL (ref 6.4–8.3)
PROT SERPL-MCNC: 7.8 G/DL (ref 6.4–8.3)
QRS DURATION - MUSE: 84 MS
QT - MUSE: 414 MS
QTC - MUSE: 430 MS
R AXIS - MUSE: 37 DEGREES
RBC # BLD AUTO: 1.88 10E6/UL (ref 3.8–5.2)
RBC # BLD AUTO: 3.16 10E6/UL (ref 3.8–5.2)
RBC # BLD AUTO: 3.17 10E6/UL (ref 3.8–5.2)
RBC # BLD AUTO: 3.2 10E6/UL (ref 3.8–5.2)
RBC # BLD AUTO: 3.89 10E6/UL (ref 3.8–5.2)
RBC AGGLUT BLD QL: ABNORMAL
RBC MORPH BLD: ABNORMAL
RBC MORPH BLD: ABNORMAL
ROULEAUX BLD QL SMEAR: ABNORMAL
SAO2 % BLDA: 100 % (ref 95–96)
SAO2 % BLDA: 97 % (ref 92–100)
SICKLE CELLS BLD QL SMEAR: ABNORMAL
SMUDGE CELLS BLD QL SMEAR: ABNORMAL
SODIUM BLD-SCNC: 142 MMOL/L (ref 135–145)
SODIUM SERPL-SCNC: 143 MMOL/L (ref 135–145)
SODIUM SERPL-SCNC: 144 MMOL/L (ref 135–145)
SODIUM SERPL-SCNC: 144 MMOL/L (ref 135–145)
SODIUM SERPL-SCNC: 145 MMOL/L (ref 135–145)
SPECIMEN EXPIRATION DATE: NORMAL
SPHEROCYTES BLD QL SMEAR: ABNORMAL
STOMATOCYTES BLD QL SMEAR: ABNORMAL
SYSTOLIC BLOOD PRESSURE - MUSE: NORMAL MMHG
T AXIS - MUSE: 65 DEGREES
TARGETS BLD QL SMEAR: ABNORMAL
TARGETS BLD QL SMEAR: ABNORMAL
TOXIC GRANULES BLD QL SMEAR: ABNORMAL
UNIT ABO/RH: NORMAL
UNIT NUMBER: NORMAL
UNIT STATUS: NORMAL
UNIT TYPE ISBT: 5100
UNIT TYPE ISBT: 7300
UNIT TYPE ISBT: 9500
VARIANT LYMPHS BLD QL SMEAR: ABNORMAL
VARIANT LYMPHS BLD QL SMEAR: PRESENT
VENTRICULAR RATE- MUSE: 65 BPM
WBC # BLD AUTO: 13.8 10E3/UL (ref 4–11)
WBC # BLD AUTO: 14.1 10E3/UL (ref 4–11)
WBC # BLD AUTO: 18.9 10E3/UL (ref 4–11)
WBC # BLD AUTO: 22.4 10E3/UL (ref 4–11)
WBC # BLD AUTO: 4.9 10E3/UL (ref 4–11)

## 2024-04-13 PROCEDURE — 250N000011 HC RX IP 250 OP 636: Performed by: STUDENT IN AN ORGANIZED HEALTH CARE EDUCATION/TRAINING PROGRAM

## 2024-04-13 PROCEDURE — 80053 COMPREHEN METABOLIC PANEL: CPT | Performed by: EMERGENCY MEDICINE

## 2024-04-13 PROCEDURE — 999N000065 XR ABDOMEN PORT 1 VIEW

## 2024-04-13 PROCEDURE — P9016 RBC LEUKOCYTES REDUCED: HCPCS | Performed by: STUDENT IN AN ORGANIZED HEALTH CARE EDUCATION/TRAINING PROGRAM

## 2024-04-13 PROCEDURE — 250N000011 HC RX IP 250 OP 636: Mod: JZ | Performed by: STUDENT IN AN ORGANIZED HEALTH CARE EDUCATION/TRAINING PROGRAM

## 2024-04-13 PROCEDURE — 99100 ANES PT EXTEME AGE<1 YR&>70: CPT | Performed by: ANESTHESIOLOGY

## 2024-04-13 PROCEDURE — 49000 EXPLORATION OF ABDOMEN: CPT | Performed by: ANESTHESIOLOGY

## 2024-04-13 PROCEDURE — 84100 ASSAY OF PHOSPHORUS: CPT | Performed by: STUDENT IN AN ORGANIZED HEALTH CARE EDUCATION/TRAINING PROGRAM

## 2024-04-13 PROCEDURE — 85730 THROMBOPLASTIN TIME PARTIAL: CPT

## 2024-04-13 PROCEDURE — 96374 THER/PROPH/DIAG INJ IV PUSH: CPT | Performed by: EMERGENCY MEDICINE

## 2024-04-13 PROCEDURE — 250N000009 HC RX 250: Performed by: STUDENT IN AN ORGANIZED HEALTH CARE EDUCATION/TRAINING PROGRAM

## 2024-04-13 PROCEDURE — 86923 COMPATIBILITY TEST ELECTRIC: CPT | Performed by: STUDENT IN AN ORGANIZED HEALTH CARE EDUCATION/TRAINING PROGRAM

## 2024-04-13 PROCEDURE — 96376 TX/PRO/DX INJ SAME DRUG ADON: CPT | Performed by: EMERGENCY MEDICINE

## 2024-04-13 PROCEDURE — 99291 CRITICAL CARE FIRST HOUR: CPT | Mod: 24 | Performed by: ANESTHESIOLOGY

## 2024-04-13 PROCEDURE — 85396 CLOTTING ASSAY WHOLE BLOOD: CPT

## 2024-04-13 PROCEDURE — 30283B1 TRANSFUSION OF NONAUTOLOGOUS 4-FACTOR PROTHROMBIN COMPLEX CONCENTRATE INTO VEIN, PERCUTANEOUS APPROACH: ICD-10-PCS | Performed by: EMERGENCY MEDICINE

## 2024-04-13 PROCEDURE — C9113 INJ PANTOPRAZOLE SODIUM, VIA: HCPCS | Performed by: STUDENT IN AN ORGANIZED HEALTH CARE EDUCATION/TRAINING PROGRAM

## 2024-04-13 PROCEDURE — 250N000011 HC RX IP 250 OP 636: Mod: JZ

## 2024-04-13 PROCEDURE — 272N000001 HC OR GENERAL SUPPLY STERILE: Performed by: SURGERY

## 2024-04-13 PROCEDURE — 82330 ASSAY OF CALCIUM: CPT | Performed by: STUDENT IN AN ORGANIZED HEALTH CARE EDUCATION/TRAINING PROGRAM

## 2024-04-13 PROCEDURE — P9037 PLATE PHERES LEUKOREDU IRRAD: HCPCS | Performed by: STUDENT IN AN ORGANIZED HEALTH CARE EDUCATION/TRAINING PROGRAM

## 2024-04-13 PROCEDURE — 0W3P0ZZ CONTROL BLEEDING IN GASTROINTESTINAL TRACT, OPEN APPROACH: ICD-10-PCS | Performed by: SURGERY

## 2024-04-13 PROCEDURE — 85014 HEMATOCRIT: CPT | Performed by: STUDENT IN AN ORGANIZED HEALTH CARE EDUCATION/TRAINING PROGRAM

## 2024-04-13 PROCEDURE — 2894A VOIDCORRECT: CPT | Mod: 78 | Performed by: SURGERY

## 2024-04-13 PROCEDURE — 250N000011 HC RX IP 250 OP 636: Mod: JZ | Performed by: EMERGENCY MEDICINE

## 2024-04-13 PROCEDURE — 85027 COMPLETE CBC AUTOMATED: CPT

## 2024-04-13 PROCEDURE — 99140 ANES COMP EMERGENCY COND: CPT | Performed by: ANESTHESIOLOGY

## 2024-04-13 PROCEDURE — 85007 BL SMEAR W/DIFF WBC COUNT: CPT | Performed by: EMERGENCY MEDICINE

## 2024-04-13 PROCEDURE — 83735 ASSAY OF MAGNESIUM: CPT | Performed by: STUDENT IN AN ORGANIZED HEALTH CARE EDUCATION/TRAINING PROGRAM

## 2024-04-13 PROCEDURE — 360N000076 HC SURGERY LEVEL 3, PER MIN: Performed by: SURGERY

## 2024-04-13 PROCEDURE — P9045 ALBUMIN (HUMAN), 5%, 250 ML: HCPCS | Mod: JZ | Performed by: ANESTHESIOLOGY

## 2024-04-13 PROCEDURE — 93005 ELECTROCARDIOGRAM TRACING: CPT | Performed by: EMERGENCY MEDICINE

## 2024-04-13 PROCEDURE — 82310 ASSAY OF CALCIUM: CPT | Performed by: STUDENT IN AN ORGANIZED HEALTH CARE EDUCATION/TRAINING PROGRAM

## 2024-04-13 PROCEDURE — 83735 ASSAY OF MAGNESIUM: CPT | Performed by: EMERGENCY MEDICINE

## 2024-04-13 PROCEDURE — 82330 ASSAY OF CALCIUM: CPT

## 2024-04-13 PROCEDURE — 999N000157 HC STATISTIC RCP TIME EA 10 MIN

## 2024-04-13 PROCEDURE — 82805 BLOOD GASES W/O2 SATURATION: CPT

## 2024-04-13 PROCEDURE — 250N000011 HC RX IP 250 OP 636

## 2024-04-13 PROCEDURE — 250N000025 HC SEVOFLURANE, PER MIN: Performed by: SURGERY

## 2024-04-13 PROCEDURE — 85025 COMPLETE CBC W/AUTO DIFF WBC: CPT | Performed by: EMERGENCY MEDICINE

## 2024-04-13 PROCEDURE — 83690 ASSAY OF LIPASE: CPT | Performed by: EMERGENCY MEDICINE

## 2024-04-13 PROCEDURE — 999N000065 XR CHEST PORT 1 VIEW

## 2024-04-13 PROCEDURE — 86923 COMPATIBILITY TEST ELECTRIC: CPT

## 2024-04-13 PROCEDURE — P9045 ALBUMIN (HUMAN), 5%, 250 ML: HCPCS | Mod: JZ | Performed by: STUDENT IN AN ORGANIZED HEALTH CARE EDUCATION/TRAINING PROGRAM

## 2024-04-13 PROCEDURE — 85027 COMPLETE CBC AUTOMATED: CPT | Performed by: STUDENT IN AN ORGANIZED HEALTH CARE EDUCATION/TRAINING PROGRAM

## 2024-04-13 PROCEDURE — 74176 CT ABD & PELVIS W/O CONTRAST: CPT

## 2024-04-13 PROCEDURE — 94002 VENT MGMT INPAT INIT DAY: CPT

## 2024-04-13 PROCEDURE — 86900 BLOOD TYPING SEROLOGIC ABO: CPT | Performed by: STUDENT IN AN ORGANIZED HEALTH CARE EDUCATION/TRAINING PROGRAM

## 2024-04-13 PROCEDURE — 258N000003 HC RX IP 258 OP 636: Performed by: EMERGENCY MEDICINE

## 2024-04-13 PROCEDURE — 258N000003 HC RX IP 258 OP 636

## 2024-04-13 PROCEDURE — 83605 ASSAY OF LACTIC ACID: CPT | Performed by: EMERGENCY MEDICINE

## 2024-04-13 PROCEDURE — 99285 EMERGENCY DEPT VISIT HI MDM: CPT | Mod: 25 | Performed by: EMERGENCY MEDICINE

## 2024-04-13 PROCEDURE — 710N000010 HC RECOVERY PHASE 1, LEVEL 2, PER MIN: Performed by: SURGERY

## 2024-04-13 PROCEDURE — 74018 RADEX ABDOMEN 1 VIEW: CPT | Mod: 26 | Performed by: RADIOLOGY

## 2024-04-13 PROCEDURE — 370N000017 HC ANESTHESIA TECHNICAL FEE, PER MIN: Performed by: SURGERY

## 2024-04-13 PROCEDURE — 71045 X-RAY EXAM CHEST 1 VIEW: CPT | Mod: 26 | Performed by: RADIOLOGY

## 2024-04-13 PROCEDURE — 85610 PROTHROMBIN TIME: CPT

## 2024-04-13 PROCEDURE — 258N000003 HC RX IP 258 OP 636: Performed by: STUDENT IN AN ORGANIZED HEALTH CARE EDUCATION/TRAINING PROGRAM

## 2024-04-13 PROCEDURE — 36415 COLL VENOUS BLD VENIPUNCTURE: CPT | Performed by: EMERGENCY MEDICINE

## 2024-04-13 PROCEDURE — 250N000009 HC RX 250

## 2024-04-13 PROCEDURE — 710N000011 HC RECOVERY PHASE 1, LEVEL 3, PER MIN: Performed by: SURGERY

## 2024-04-13 PROCEDURE — 85384 FIBRINOGEN ACTIVITY: CPT

## 2024-04-13 PROCEDURE — 250N000011 HC RX IP 250 OP 636: Mod: JZ | Performed by: ANESTHESIOLOGY

## 2024-04-13 PROCEDURE — 250N000011 HC RX IP 250 OP 636: Performed by: EMERGENCY MEDICINE

## 2024-04-13 PROCEDURE — 0DN80ZZ RELEASE SMALL INTESTINE, OPEN APPROACH: ICD-10-PCS | Performed by: SURGERY

## 2024-04-13 PROCEDURE — 83605 ASSAY OF LACTIC ACID: CPT | Performed by: STUDENT IN AN ORGANIZED HEALTH CARE EDUCATION/TRAINING PROGRAM

## 2024-04-13 PROCEDURE — 93010 ELECTROCARDIOGRAM REPORT: CPT | Performed by: EMERGENCY MEDICINE

## 2024-04-13 PROCEDURE — 4A1BXSH MONITORING OF GASTROINTESTINAL VASCULAR PERFUSION USING INDOCYANINE GREEN DYE, EXTERNAL APPROACH: ICD-10-PCS | Performed by: SURGERY

## 2024-04-13 PROCEDURE — 74176 CT ABD & PELVIS W/O CONTRAST: CPT | Mod: 26 | Performed by: RADIOLOGY

## 2024-04-13 PROCEDURE — P9016 RBC LEUKOCYTES REDUCED: HCPCS

## 2024-04-13 PROCEDURE — 84295 ASSAY OF SERUM SODIUM: CPT

## 2024-04-13 PROCEDURE — 96375 TX/PRO/DX INJ NEW DRUG ADDON: CPT | Performed by: EMERGENCY MEDICINE

## 2024-04-13 PROCEDURE — 250N000011 HC RX IP 250 OP 636: Performed by: SURGERY

## 2024-04-13 PROCEDURE — 120N000005 HC R&B MS OVERFLOW UMMC

## 2024-04-13 RX ORDER — FENTANYL CITRATE 50 UG/ML
50 INJECTION, SOLUTION INTRAMUSCULAR; INTRAVENOUS EVERY 5 MIN PRN
Status: DISCONTINUED | OUTPATIENT
Start: 2024-04-13 | End: 2024-04-13 | Stop reason: HOSPADM

## 2024-04-13 RX ORDER — SODIUM CHLORIDE, SODIUM LACTATE, POTASSIUM CHLORIDE, CALCIUM CHLORIDE 600; 310; 30; 20 MG/100ML; MG/100ML; MG/100ML; MG/100ML
INJECTION, SOLUTION INTRAVENOUS CONTINUOUS
Status: DISCONTINUED | OUTPATIENT
Start: 2024-04-13 | End: 2024-04-15

## 2024-04-13 RX ORDER — ONDANSETRON 4 MG/1
4 TABLET, ORALLY DISINTEGRATING ORAL EVERY 30 MIN PRN
Status: CANCELLED | OUTPATIENT
Start: 2024-04-13

## 2024-04-13 RX ORDER — CEFTRIAXONE 1 G/1
1 INJECTION, POWDER, FOR SOLUTION INTRAMUSCULAR; INTRAVENOUS ONCE
Qty: 10 ML | Refills: 0 | Status: DISCONTINUED | OUTPATIENT
Start: 2024-04-13 | End: 2024-04-13

## 2024-04-13 RX ORDER — ACETAMINOPHEN 325 MG/1
975 TABLET ORAL EVERY 8 HOURS
Status: DISCONTINUED | OUTPATIENT
Start: 2024-04-13 | End: 2024-04-13

## 2024-04-13 RX ORDER — LIDOCAINE HYDROCHLORIDE 20 MG/ML
INJECTION, SOLUTION INFILTRATION; PERINEURAL PRN
Status: DISCONTINUED | OUTPATIENT
Start: 2024-04-13 | End: 2024-04-13

## 2024-04-13 RX ORDER — HYDROMORPHONE HCL IN WATER/PF 6 MG/30 ML
0.2 PATIENT CONTROLLED ANALGESIA SYRINGE INTRAVENOUS
Status: DISCONTINUED | OUTPATIENT
Start: 2024-04-13 | End: 2024-04-13

## 2024-04-13 RX ORDER — CEFTRIAXONE 2 G/1
2 INJECTION, POWDER, FOR SOLUTION INTRAMUSCULAR; INTRAVENOUS EVERY 24 HOURS
Status: DISCONTINUED | OUTPATIENT
Start: 2024-04-13 | End: 2024-04-19

## 2024-04-13 RX ORDER — HYDROMORPHONE HCL IN WATER/PF 6 MG/30 ML
0.1 PATIENT CONTROLLED ANALGESIA SYRINGE INTRAVENOUS
Status: DISCONTINUED | OUTPATIENT
Start: 2024-04-13 | End: 2024-04-13

## 2024-04-13 RX ORDER — NALOXONE HYDROCHLORIDE 0.4 MG/ML
0.2 INJECTION, SOLUTION INTRAMUSCULAR; INTRAVENOUS; SUBCUTANEOUS
Status: DISCONTINUED | OUTPATIENT
Start: 2024-04-13 | End: 2024-04-22 | Stop reason: HOSPADM

## 2024-04-13 RX ORDER — KETAMINE HYDROCHLORIDE 10 MG/ML
INJECTION INTRAMUSCULAR; INTRAVENOUS PRN
Status: DISCONTINUED | OUTPATIENT
Start: 2024-04-13 | End: 2024-04-13

## 2024-04-13 RX ORDER — FENTANYL CITRATE 50 UG/ML
25 INJECTION, SOLUTION INTRAMUSCULAR; INTRAVENOUS ONCE
Status: COMPLETED | OUTPATIENT
Start: 2024-04-13 | End: 2024-04-13

## 2024-04-13 RX ORDER — HYDROMORPHONE HCL IN WATER/PF 6 MG/30 ML
0.4 PATIENT CONTROLLED ANALGESIA SYRINGE INTRAVENOUS EVERY 5 MIN PRN
Status: DISCONTINUED | OUTPATIENT
Start: 2024-04-13 | End: 2024-04-13 | Stop reason: HOSPADM

## 2024-04-13 RX ORDER — POTASSIUM CHLORIDE 7.45 MG/ML
10 INJECTION INTRAVENOUS ONCE
Status: COMPLETED | OUTPATIENT
Start: 2024-04-13 | End: 2024-04-13

## 2024-04-13 RX ORDER — INDOCYANINE GREEN AND WATER 25 MG
KIT INJECTION PRN
Status: DISCONTINUED | OUTPATIENT
Start: 2024-04-13 | End: 2024-04-13

## 2024-04-13 RX ORDER — OXYCODONE HYDROCHLORIDE 10 MG/1
10 TABLET ORAL
Status: CANCELLED | OUTPATIENT
Start: 2024-04-13

## 2024-04-13 RX ORDER — FENTANYL CITRATE 50 UG/ML
25 INJECTION, SOLUTION INTRAMUSCULAR; INTRAVENOUS EVERY 5 MIN PRN
Status: DISCONTINUED | OUTPATIENT
Start: 2024-04-13 | End: 2024-04-13 | Stop reason: HOSPADM

## 2024-04-13 RX ORDER — NALOXONE HYDROCHLORIDE 0.4 MG/ML
0.1 INJECTION, SOLUTION INTRAMUSCULAR; INTRAVENOUS; SUBCUTANEOUS
Status: CANCELLED | OUTPATIENT
Start: 2024-04-13

## 2024-04-13 RX ORDER — ONDANSETRON 4 MG/1
4 TABLET, ORALLY DISINTEGRATING ORAL EVERY 6 HOURS PRN
Status: DISCONTINUED | OUTPATIENT
Start: 2024-04-13 | End: 2024-04-22 | Stop reason: HOSPADM

## 2024-04-13 RX ORDER — MAGNESIUM SULFATE HEPTAHYDRATE 40 MG/ML
2 INJECTION, SOLUTION INTRAVENOUS ONCE
Status: COMPLETED | OUTPATIENT
Start: 2024-04-13 | End: 2024-04-13

## 2024-04-13 RX ORDER — PROPOFOL 10 MG/ML
INJECTION, EMULSION INTRAVENOUS PRN
Status: DISCONTINUED | OUTPATIENT
Start: 2024-04-13 | End: 2024-04-13

## 2024-04-13 RX ORDER — NALOXONE HYDROCHLORIDE 0.4 MG/ML
0.1 INJECTION, SOLUTION INTRAMUSCULAR; INTRAVENOUS; SUBCUTANEOUS
Status: DISCONTINUED | OUTPATIENT
Start: 2024-04-13 | End: 2024-04-13 | Stop reason: HOSPADM

## 2024-04-13 RX ORDER — CALCIUM CHLORIDE 100 MG/ML
INJECTION INTRAVENOUS; INTRAVENTRICULAR PRN
Status: DISCONTINUED | OUTPATIENT
Start: 2024-04-13 | End: 2024-04-13

## 2024-04-13 RX ORDER — CEFAZOLIN SODIUM/WATER 2 G/20 ML
2 SYRINGE (ML) INTRAVENOUS
Status: CANCELLED | OUTPATIENT
Start: 2024-04-13

## 2024-04-13 RX ORDER — NALOXONE HYDROCHLORIDE 0.4 MG/ML
0.4 INJECTION, SOLUTION INTRAMUSCULAR; INTRAVENOUS; SUBCUTANEOUS
Status: DISCONTINUED | OUTPATIENT
Start: 2024-04-13 | End: 2024-04-22 | Stop reason: HOSPADM

## 2024-04-13 RX ORDER — SODIUM CHLORIDE, SODIUM LACTATE, POTASSIUM CHLORIDE, CALCIUM CHLORIDE 600; 310; 30; 20 MG/100ML; MG/100ML; MG/100ML; MG/100ML
INJECTION, SOLUTION INTRAVENOUS CONTINUOUS PRN
Status: DISCONTINUED | OUTPATIENT
Start: 2024-04-13 | End: 2024-04-13

## 2024-04-13 RX ORDER — CEFAZOLIN SODIUM 1 G/3ML
INJECTION, POWDER, FOR SOLUTION INTRAMUSCULAR; INTRAVENOUS PRN
Status: DISCONTINUED | OUTPATIENT
Start: 2024-04-13 | End: 2024-04-13

## 2024-04-13 RX ORDER — SODIUM CHLORIDE, SODIUM LACTATE, POTASSIUM CHLORIDE, CALCIUM CHLORIDE 600; 310; 30; 20 MG/100ML; MG/100ML; MG/100ML; MG/100ML
INJECTION, SOLUTION INTRAVENOUS CONTINUOUS
Status: DISCONTINUED | OUTPATIENT
Start: 2024-04-13 | End: 2024-04-13 | Stop reason: HOSPADM

## 2024-04-13 RX ORDER — PROPOFOL 10 MG/ML
5-75 INJECTION, EMULSION INTRAVENOUS CONTINUOUS
Status: DISCONTINUED | OUTPATIENT
Start: 2024-04-13 | End: 2024-04-14

## 2024-04-13 RX ORDER — ACETAMINOPHEN 325 MG/1
650 TABLET ORAL EVERY 4 HOURS PRN
Status: DISCONTINUED | OUTPATIENT
Start: 2024-04-16 | End: 2024-04-14

## 2024-04-13 RX ORDER — BUDESONIDE 0.5 MG/2ML
0.5 INHALANT ORAL 2 TIMES DAILY
Status: DISCONTINUED | OUTPATIENT
Start: 2024-04-13 | End: 2024-04-13

## 2024-04-13 RX ORDER — ROSUVASTATIN CALCIUM 20 MG/1
20 TABLET, COATED ORAL DAILY
Status: DISCONTINUED | OUTPATIENT
Start: 2024-04-14 | End: 2024-04-14

## 2024-04-13 RX ORDER — METRONIDAZOLE 500 MG/100ML
500 INJECTION, SOLUTION INTRAVENOUS EVERY 12 HOURS
Status: DISCONTINUED | OUTPATIENT
Start: 2024-04-13 | End: 2024-04-14

## 2024-04-13 RX ORDER — LIDOCAINE 40 MG/G
CREAM TOPICAL
Status: DISCONTINUED | OUTPATIENT
Start: 2024-04-13 | End: 2024-04-22 | Stop reason: HOSPADM

## 2024-04-13 RX ORDER — ONDANSETRON 2 MG/ML
4 INJECTION INTRAMUSCULAR; INTRAVENOUS ONCE
Status: COMPLETED | OUTPATIENT
Start: 2024-04-13 | End: 2024-04-13

## 2024-04-13 RX ORDER — EPHEDRINE SULFATE 50 MG/ML
INJECTION, SOLUTION INTRAMUSCULAR; INTRAVENOUS; SUBCUTANEOUS PRN
Status: DISCONTINUED | OUTPATIENT
Start: 2024-04-13 | End: 2024-04-13

## 2024-04-13 RX ORDER — ONDANSETRON 2 MG/ML
4 INJECTION INTRAMUSCULAR; INTRAVENOUS EVERY 30 MIN PRN
Status: CANCELLED | OUTPATIENT
Start: 2024-04-13

## 2024-04-13 RX ORDER — ONDANSETRON 2 MG/ML
4 INJECTION INTRAMUSCULAR; INTRAVENOUS EVERY 30 MIN PRN
Status: DISCONTINUED | OUTPATIENT
Start: 2024-04-13 | End: 2024-04-13 | Stop reason: HOSPADM

## 2024-04-13 RX ORDER — METRONIDAZOLE 500 MG/100ML
500 INJECTION, SOLUTION INTRAVENOUS EVERY 8 HOURS
Status: DISCONTINUED | OUTPATIENT
Start: 2024-04-13 | End: 2024-04-13

## 2024-04-13 RX ORDER — PHENYLEPHRINE HCL IN 0.9% NACL 50MG/250ML
.5-1.25 PLASTIC BAG, INJECTION (ML) INTRAVENOUS CONTINUOUS
Status: DISCONTINUED | OUTPATIENT
Start: 2024-04-13 | End: 2024-04-13

## 2024-04-13 RX ORDER — CEFAZOLIN SODIUM/WATER 2 G/20 ML
SYRINGE (ML) INTRAVENOUS PRN
Status: DISCONTINUED | OUTPATIENT
Start: 2024-04-13 | End: 2024-04-13

## 2024-04-13 RX ORDER — FENTANYL CITRATE 50 UG/ML
INJECTION, SOLUTION INTRAMUSCULAR; INTRAVENOUS PRN
Status: DISCONTINUED | OUTPATIENT
Start: 2024-04-13 | End: 2024-04-13

## 2024-04-13 RX ORDER — METOPROLOL TARTRATE 50 MG
50 TABLET ORAL 2 TIMES DAILY
Status: CANCELLED | OUTPATIENT
Start: 2024-04-13

## 2024-04-13 RX ORDER — VASOPRESSIN IN 0.9 % NACL 2 UNIT/2ML
SYRINGE (ML) INTRAVENOUS PRN
Status: DISCONTINUED | OUTPATIENT
Start: 2024-04-13 | End: 2024-04-13

## 2024-04-13 RX ORDER — ISOSORBIDE MONONITRATE 20 MG/1
40 TABLET ORAL 3 TIMES DAILY
Status: CANCELLED | OUTPATIENT
Start: 2024-04-13

## 2024-04-13 RX ORDER — OXYCODONE HYDROCHLORIDE 5 MG/1
5 TABLET ORAL
Status: CANCELLED | OUTPATIENT
Start: 2024-04-13

## 2024-04-13 RX ORDER — CEFAZOLIN SODIUM/WATER 2 G/20 ML
2 SYRINGE (ML) INTRAVENOUS SEE ADMIN INSTRUCTIONS
Status: CANCELLED | OUTPATIENT
Start: 2024-04-13

## 2024-04-13 RX ORDER — ONDANSETRON 4 MG/1
4 TABLET, ORALLY DISINTEGRATING ORAL EVERY 30 MIN PRN
Status: DISCONTINUED | OUTPATIENT
Start: 2024-04-13 | End: 2024-04-13 | Stop reason: HOSPADM

## 2024-04-13 RX ORDER — ONDANSETRON 2 MG/ML
4 INJECTION INTRAMUSCULAR; INTRAVENOUS EVERY 6 HOURS PRN
Status: DISCONTINUED | OUTPATIENT
Start: 2024-04-13 | End: 2024-04-22 | Stop reason: HOSPADM

## 2024-04-13 RX ORDER — HYDROMORPHONE HCL IN WATER/PF 6 MG/30 ML
0.2 PATIENT CONTROLLED ANALGESIA SYRINGE INTRAVENOUS EVERY 5 MIN PRN
Status: DISCONTINUED | OUTPATIENT
Start: 2024-04-13 | End: 2024-04-13 | Stop reason: HOSPADM

## 2024-04-13 RX ORDER — SODIUM CHLORIDE, SODIUM GLUCONATE, SODIUM ACETATE, POTASSIUM CHLORIDE AND MAGNESIUM CHLORIDE 526; 502; 368; 37; 30 MG/100ML; MG/100ML; MG/100ML; MG/100ML; MG/100ML
INJECTION, SOLUTION INTRAVENOUS CONTINUOUS PRN
Status: DISCONTINUED | OUTPATIENT
Start: 2024-04-13 | End: 2024-04-13

## 2024-04-13 RX ORDER — TIMOLOL MALEATE 5 MG/ML
1 SOLUTION/ DROPS OPHTHALMIC DAILY
Status: DISCONTINUED | OUTPATIENT
Start: 2024-04-14 | End: 2024-04-22 | Stop reason: HOSPADM

## 2024-04-13 RX ORDER — ONDANSETRON 2 MG/ML
INJECTION INTRAMUSCULAR; INTRAVENOUS PRN
Status: DISCONTINUED | OUTPATIENT
Start: 2024-04-13 | End: 2024-04-13

## 2024-04-13 RX ADMIN — Medication 50 MG: at 11:43

## 2024-04-13 RX ADMIN — FENTANYL CITRATE 25 MCG: 50 INJECTION, SOLUTION INTRAMUSCULAR; INTRAVENOUS at 04:03

## 2024-04-13 RX ADMIN — SUGAMMADEX 60 MG: 100 INJECTION, SOLUTION INTRAVENOUS at 07:48

## 2024-04-13 RX ADMIN — INDOCYANINE GREEN AND WATER 2.5 MG: KIT at 07:17

## 2024-04-13 RX ADMIN — SODIUM CHLORIDE 500 ML: 9 INJECTION, SOLUTION INTRAVENOUS at 04:02

## 2024-04-13 RX ADMIN — MAGNESIUM SULFATE HEPTAHYDRATE 2 G: 2 INJECTION, SOLUTION INTRAVENOUS at 20:25

## 2024-04-13 RX ADMIN — ALBUMIN HUMAN 12.5 G: 0.05 INJECTION, SOLUTION INTRAVENOUS at 09:54

## 2024-04-13 RX ADMIN — Medication 2 G: at 06:23

## 2024-04-13 RX ADMIN — CEFTRIAXONE SODIUM 2 G: 2 INJECTION, POWDER, FOR SOLUTION INTRAMUSCULAR; INTRAVENOUS at 15:38

## 2024-04-13 RX ADMIN — SODIUM CHLORIDE, POTASSIUM CHLORIDE, SODIUM LACTATE AND CALCIUM CHLORIDE: 600; 310; 30; 20 INJECTION, SOLUTION INTRAVENOUS at 05:56

## 2024-04-13 RX ADMIN — PROPOFOL 30 MCG/KG/MIN: 10 INJECTION, EMULSION INTRAVENOUS at 15:01

## 2024-04-13 RX ADMIN — HYDROMORPHONE HYDROCHLORIDE 0.2 MG: 1 INJECTION, SOLUTION INTRAMUSCULAR; INTRAVENOUS; SUBCUTANEOUS at 07:47

## 2024-04-13 RX ADMIN — PHENYLEPHRINE HYDROCHLORIDE 200 MCG: 10 INJECTION INTRAVENOUS at 06:53

## 2024-04-13 RX ADMIN — ONDANSETRON 4 MG: 2 INJECTION INTRAMUSCULAR; INTRAVENOUS at 07:05

## 2024-04-13 RX ADMIN — ALBUMIN HUMAN 12.5 G: 0.05 INJECTION, SOLUTION INTRAVENOUS at 10:12

## 2024-04-13 RX ADMIN — SODIUM CHLORIDE, POTASSIUM CHLORIDE, SODIUM LACTATE AND CALCIUM CHLORIDE: 600; 310; 30; 20 INJECTION, SOLUTION INTRAVENOUS at 06:50

## 2024-04-13 RX ADMIN — SODIUM CHLORIDE, SODIUM GLUCONATE, SODIUM ACETATE, POTASSIUM CHLORIDE AND MAGNESIUM CHLORIDE: 526; 502; 368; 37; 30 INJECTION, SOLUTION INTRAVENOUS at 11:33

## 2024-04-13 RX ADMIN — EPHEDRINE SULFATE 10 MG: 5 INJECTION INTRAVENOUS at 08:55

## 2024-04-13 RX ADMIN — Medication 25 MCG/HR: at 15:15

## 2024-04-13 RX ADMIN — PHENYLEPHRINE HYDROCHLORIDE 100 MCG: 10 INJECTION INTRAVENOUS at 07:04

## 2024-04-13 RX ADMIN — PHENYLEPHRINE HYDROCHLORIDE 100 MCG: 10 INJECTION INTRAVENOUS at 06:47

## 2024-04-13 RX ADMIN — Medication 0.5 MCG/KG/MIN: at 10:59

## 2024-04-13 RX ADMIN — PROTHROMBIN, COAGULATION FACTOR VII HUMAN, COAGULATION FACTOR IX HUMAN, COAGULATION FACTOR X HUMAN, PROTEIN C, PROTEIN S HUMAN, AND WATER 3249 UNITS: KIT at 07:20

## 2024-04-13 RX ADMIN — METRONIDAZOLE 500 MG: 500 INJECTION, SOLUTION INTRAVENOUS at 16:12

## 2024-04-13 RX ADMIN — PANTOPRAZOLE SODIUM 40 MG: 40 INJECTION, POWDER, FOR SOLUTION INTRAVENOUS at 16:02

## 2024-04-13 RX ADMIN — CEFAZOLIN 2 G: 1 INJECTION, POWDER, FOR SOLUTION INTRAMUSCULAR; INTRAVENOUS at 11:55

## 2024-04-13 RX ADMIN — PHENYLEPHRINE HYDROCHLORIDE 200 MCG: 10 INJECTION INTRAVENOUS at 07:13

## 2024-04-13 RX ADMIN — SUCCINYLCHOLINE CHLORIDE 100 MG: 20 INJECTION, SOLUTION INTRAMUSCULAR; INTRAVENOUS; PARENTERAL at 11:26

## 2024-04-13 RX ADMIN — PHENYLEPHRINE HYDROCHLORIDE 200 MCG: 10 INJECTION INTRAVENOUS at 07:31

## 2024-04-13 RX ADMIN — Medication 70 MG: at 06:04

## 2024-04-13 RX ADMIN — FENTANYL CITRATE 50 MCG: 50 INJECTION INTRAMUSCULAR; INTRAVENOUS at 06:01

## 2024-04-13 RX ADMIN — POTASSIUM CHLORIDE 10 MEQ: 7.46 INJECTION, SOLUTION INTRAVENOUS at 19:05

## 2024-04-13 RX ADMIN — PHENYLEPHRINE HYDROCHLORIDE 200 MCG: 10 INJECTION INTRAVENOUS at 06:49

## 2024-04-13 RX ADMIN — FENTANYL CITRATE 100 MCG: 50 INJECTION INTRAMUSCULAR; INTRAVENOUS at 12:42

## 2024-04-13 RX ADMIN — HYDROMORPHONE HYDROCHLORIDE 0.5 MG: 1 INJECTION, SOLUTION INTRAMUSCULAR; INTRAVENOUS; SUBCUTANEOUS at 06:57

## 2024-04-13 RX ADMIN — FENTANYL CITRATE 25 MCG: 50 INJECTION, SOLUTION INTRAMUSCULAR; INTRAVENOUS at 05:24

## 2024-04-13 RX ADMIN — FENTANYL CITRATE 50 MCG: 50 INJECTION INTRAMUSCULAR; INTRAVENOUS at 06:37

## 2024-04-13 RX ADMIN — FENTANYL CITRATE 100 MCG: 50 INJECTION INTRAMUSCULAR; INTRAVENOUS at 12:30

## 2024-04-13 RX ADMIN — CALCIUM CHLORIDE INJECTION 1 G: 100 INJECTION, SOLUTION INTRAVENOUS at 12:15

## 2024-04-13 RX ADMIN — PROPOFOL 90 MG: 10 INJECTION, EMULSION INTRAVENOUS at 06:04

## 2024-04-13 RX ADMIN — Medication 1 ML: at 11:34

## 2024-04-13 RX ADMIN — EPHEDRINE SULFATE 10 MG: 5 INJECTION INTRAVENOUS at 08:29

## 2024-04-13 RX ADMIN — SODIUM CHLORIDE, SODIUM GLUCONATE, SODIUM ACETATE, POTASSIUM CHLORIDE AND MAGNESIUM CHLORIDE: 526; 502; 368; 37; 30 INJECTION, SOLUTION INTRAVENOUS at 13:11

## 2024-04-13 RX ADMIN — NOREPINEPHRINE BITARTRATE 0.03 MCG/KG/MIN: 1 INJECTION INTRAVENOUS at 15:25

## 2024-04-13 RX ADMIN — SODIUM CHLORIDE, POTASSIUM CHLORIDE, SODIUM LACTATE AND CALCIUM CHLORIDE: 600; 310; 30; 20 INJECTION, SOLUTION INTRAVENOUS at 11:04

## 2024-04-13 RX ADMIN — PHENYLEPHRINE HYDROCHLORIDE 150 MCG: 10 INJECTION INTRAVENOUS at 07:10

## 2024-04-13 RX ADMIN — ONDANSETRON 4 MG: 2 INJECTION INTRAMUSCULAR; INTRAVENOUS at 04:03

## 2024-04-13 RX ADMIN — POTASSIUM CHLORIDE 10 MEQ: 7.46 INJECTION, SOLUTION INTRAVENOUS at 05:00

## 2024-04-13 RX ADMIN — FENTANYL CITRATE 50 MCG: 50 INJECTION INTRAMUSCULAR; INTRAVENOUS at 12:08

## 2024-04-13 RX ADMIN — SUGAMMADEX 140 MG: 100 INJECTION, SOLUTION INTRAVENOUS at 07:43

## 2024-04-13 RX ADMIN — Medication 50 MG: at 11:26

## 2024-04-13 RX ADMIN — PROPOFOL 20 MCG/KG/MIN: 10 INJECTION, EMULSION INTRAVENOUS at 22:08

## 2024-04-13 RX ADMIN — LIDOCAINE HYDROCHLORIDE 50 MG: 20 INJECTION, SOLUTION INFILTRATION; PERINEURAL at 06:04

## 2024-04-13 RX ADMIN — PHENYLEPHRINE HYDROCHLORIDE 0.15 MCG/KG/MIN: 10 INJECTION INTRAVENOUS at 07:16

## 2024-04-13 RX ADMIN — SODIUM CHLORIDE, POTASSIUM CHLORIDE, SODIUM LACTATE AND CALCIUM CHLORIDE: 600; 310; 30; 20 INJECTION, SOLUTION INTRAVENOUS at 11:22

## 2024-04-13 RX ADMIN — EPHEDRINE SULFATE 5 MG: 5 INJECTION INTRAVENOUS at 11:05

## 2024-04-13 ASSESSMENT — COLUMBIA-SUICIDE SEVERITY RATING SCALE - C-SSRS
2. HAVE YOU ACTUALLY HAD ANY THOUGHTS OF KILLING YOURSELF IN THE PAST MONTH?: NO
6. HAVE YOU EVER DONE ANYTHING, STARTED TO DO ANYTHING, OR PREPARED TO DO ANYTHING TO END YOUR LIFE?: NO
1. IN THE PAST MONTH, HAVE YOU WISHED YOU WERE DEAD OR WISHED YOU COULD GO TO SLEEP AND NOT WAKE UP?: NO

## 2024-04-13 ASSESSMENT — ACTIVITIES OF DAILY LIVING (ADL)
ADLS_ACUITY_SCORE: 40
ADLS_ACUITY_SCORE: 42
ADLS_ACUITY_SCORE: 45
ADLS_ACUITY_SCORE: 40
ADLS_ACUITY_SCORE: 40
ADLS_ACUITY_SCORE: 45
ADLS_ACUITY_SCORE: 42
ADLS_ACUITY_SCORE: 42
ADLS_ACUITY_SCORE: 40
ADLS_ACUITY_SCORE: 57
ADLS_ACUITY_SCORE: 40
ADLS_ACUITY_SCORE: 45
ADLS_ACUITY_SCORE: 40
ADLS_ACUITY_SCORE: 42
ADLS_ACUITY_SCORE: 45
ADLS_ACUITY_SCORE: 42
ADLS_ACUITY_SCORE: 57
ADLS_ACUITY_SCORE: 40
ADLS_ACUITY_SCORE: 57
ADLS_ACUITY_SCORE: 45
ADLS_ACUITY_SCORE: 45

## 2024-04-13 ASSESSMENT — ENCOUNTER SYMPTOMS
DYSRHYTHMIAS: 1
DYSRHYTHMIAS: 1

## 2024-04-13 NOTE — OR NURSING
Consent for blood transfusion from Ludwin (spouse) obtained via telephone by Dr. Marion. RN to witness consent on paper

## 2024-04-13 NOTE — ANESTHESIA PREPROCEDURE EVALUATION
Anesthesia Pre-Procedure Evaluation    Patient: Tessa Mansfield   MRN: [id number] : 1935        Procedure : Procedure(s):  Laparotomy exploratory          Past Medical History:   Diagnosis Date    CAD (coronary artery disease)     CAD s/p PCI+BMS to MRCA 10/2002, PCI to mLCX 2003, PCI+DESx2 to pLAD 2007, PCI+DESx1 to dRCA 2007, PCI+ALVAREZ to RPDA 2013; on indefinite DAPT  10/27/2002    10/27/2002: AMI s/p PCI+BMS (3.5x18mm Bx velocity) to mRCA 2003: Back pain; PCI+stent to mLCX c/b distal embolization/slow flow 2003: Unstable angina; PCI+stent (Hepacoat velocity) to mLAD 2007: PCI+DESx2 to pLAD (IVUS w/ calcification of LMCA and ulcerated plaque pLAD, 80% dRCA; also had 80% dLCX, too small to stent) 2007: Staged PCI. PCI+DESx1 (3.5x13mm Cypher) to dRCA (indefinite DAPT recommended at this time) 2008: Angina. mLCX stent 70% ISR. LAD and RCA stents patent. Myocardium at risk from LCX felt to be small, medical management preferred to PCI. 11/10/2009: Angina. Findings unchanged from 2008, FFR LAD 0.90. Medical management recommended. 2013: Unstable angina; PCI+ALVAREZ to mPDA. Diagnostic findings: LMCA 40% distal. LAD: pLAD stents patent mLAD 30% ISR dLAD 50% diffuse, D2 diffuse disease. LCX 80% mid ISR. RCA diffuse <30% mid, RPLAs diffuse disease, RPDA 100% occlusion.      Cardiac Pacemaker- Medtronic, dual chamber- NOT dependant 2007    CKD (chronic kidney disease), stage IV (H)     Hyperlipidemia LDL goal <70     Hypertension     Stented coronary artery 10-    RCA    Stented coronary artery 2003    LCx    Stented coronary artery 2003    LAD    Stented coronary artery 2007    LAD    Stented coronary artery 2007    RCA    Transient complete heart block (H) 2007      Past Surgical History:   Procedure Laterality Date    CHOLECYSTECTOMY      CV CORONARY ANGIOGRAM N/A 2022    Procedure: Coronary Angiogram;  Surgeon: Gilberto  "MD Constantine;  Location:  HEART CARDIAC CATH LAB    CV CORONARY ANGIOGRAM N/A 7/17/2023    Procedure: Coronary Angiogram;  Surgeon: Constantine Macias MD;  Location: [facility] CARDIAC CATH LAB    CV PCI N/A 6/17/2022    Procedure: Percutaneous Coronary Intervention;  Surgeon: Constantine Macias MD;  Location: [facility] CARDIAC CATH LAB    CV PCI N/A 7/17/2023    Procedure: Percutaneous Coronary Intervention;  Surgeon: Contsantine Macias MD;  Location: [facility] CARDIAC CATH LAB    CV PCI N/A 11/6/2023    Procedure: Percutaneous Coronary Intervention;  Surgeon: Constantine Macias MD;  Location: [facility] CARDIAC CATH LAB    CV RIGHT HEART CATH MEASUREMENTS RECORDED N/A 6/17/2022    Procedure: Right Heart Catheterization;  Surgeon: Constantine Macias MD;  Location: [facility] CARDIAC CATH LAB    EP PACEMAKER N/A 10/15/2019    Procedure: EP PACEMAKER;  Surgeon: Anthony Zhu MD;  Location: [facility] CARDIAC CATH LAB    ESOPHAGOSCOPY, GASTROSCOPY, DUODENOSCOPY (EGD), COMBINED N/A 7/20/2023    Procedure: Esophagoscopy, gastroscopy, duodenoscopy (EGD), combined;  Surgeon: Bekah Dash DO;  Location:  GI    HC PPM INSERTION NEW/REPLACEMENT W/ ATRIAL&VENTRICULAR LEAD  11-    HYSTERECTOMY        Allergies   Allergen Reactions    Ciprofloxacin Rash    Codeine Sulfate Itching    Shrimp Swelling    Bactrim [Sulfamethoxazole W/Trimethoprim]      Patient unable to recall    Biaxin [Clarithromycin]     Chlorthalidone Nausea and Vomiting    Clonidine     Darvon [Propoxyphene Hcl]     Dilaudid [Hydromorphone] Visual Disturbance and Hallucination    Gabapentin Fatigue and Confusion     \"felt drunk\"    Indomethacin     Levaquin [Levofloxacin Hemihydrate]     Morphine Sulfate     Percocet [Oxycodone-Acetaminophen] Hallucination    Pregabalin Itching and Fatigue    Simvastatin Palpitations     Muscle weakness, leg cramping      Spironolactone      Dehydrated      Terazosin       Social History "     Tobacco Use    Smoking status: Former     Current packs/day: 0.30     Average packs/day: 0.3 packs/day for 15.0 years (4.5 ttl pk-yrs)     Types: Cigarettes    Smokeless tobacco: Never    Tobacco comments:     Quit 35+ years ago   Substance Use Topics    Alcohol use: Not on file      Wt Readings from Last 1 Encounters:   04/13/24 69.4 kg (153 lb)        Anesthesia Evaluation   Pt has had prior anesthetic. Type: General.    History of anesthetic complications   See anesthesia note 4/13/24.    ROS/MED HX  ENT/Pulmonary:     (+)                      asthma                  Neurologic:  - neg neurologic ROS  (-) no CVA   Cardiovascular:     (+)  hypertension- -  CAD - past MI - stent-7/1723. 3 Drug Eluting Stent. Taking blood thinners   CHF  Last EF: 60%           ICD Reason placed:AP: 70.5% : <0.1% Presenting EGM: NSR with PVCs @ 92 bpm Short V-V intervals: 0 Lead Trends Appear Stable: Yes Estimated battery longevity to RRT = 9.2 years. Battery voltage = 3.00 V. Atrial Arrhythmia: Total AT/AF time = 9 seconds. AF Lewis: <0.1% Anticoagulant: Eliquis Ventricular Arrhythmia: 0.  type;Medtronic [id number] [place] XT DR MRI Normal Device Function Settings AAIR<=>DDDR  bpm - Patient is dependent on ICD. dysrhythmias, a-fib and Sick Sinus Syndrome,        Previous cardiac testing   Echo: Date: 10/23 Results:  Interpretation Summary  Global and regional left ventricular function is normal with an EF of 55-60%.  Right ventricular function, chamber size, wall motion, and thickness are  normal.  Mild to moderate mitral insufficiency is present.  Mild tricuspid insufficiency is present.  The right ventricular systolic pressure is 35mmHg above the right atrial  pressure.  The inferior vena cava is normal.  No pericardial effusion is present.  Stress Test:  Date: Results:    ECG Reviewed:  Date: Results:    Cath:  Date: Results:      METS/Exercise Tolerance:     Hematologic: Comments: Thrombocytopenia, coagulopathic    (+)       anemia, history of blood transfusion, no previous transfusion reaction,        Musculoskeletal:  - neg musculoskeletal ROS     GI/Hepatic: Comment: SBO sp ex lap x2 4/13.     (+) GERD,                   Renal/Genitourinary:     (+) renal disease, type: CRI,            Endo:  - neg endo ROS     Psychiatric/Substance Use:  - neg psychiatric ROS     Infectious Disease:  - neg infectious disease ROS     Malignancy:  - neg malignancy ROS     Other:            Physical Exam    Airway             Respiratory Devices and Support    ETT:      Dental           Cardiovascular             Pulmonary                   OUTSIDE LABS:  CBC:   Lab Results   Component Value Date    WBC 14.1 (H) 04/13/2024    WBC 13.8 (H) 04/13/2024    HGB 9.3 (L) 04/13/2024    HGB 3.8 (LL) 04/13/2024    HCT 28.5 (L) 04/13/2024    HCT 17.5 (L) 04/13/2024    PLT 31 (LL) 04/13/2024    PLT 61 (L) 04/13/2024     BMP:   Lab Results   Component Value Date     04/13/2024     04/13/2024    POTASSIUM 3.8 04/13/2024    POTASSIUM 3.8 04/13/2024    CHLORIDE 107 04/13/2024    CHLORIDE 107 04/13/2024    CO2 20 (L) 04/13/2024    CO2 20 (L) 04/13/2024    BUN 22.5 04/13/2024    BUN 22.5 04/13/2024    CR 1.59 (H) 04/13/2024    CR 1.59 (H) 04/13/2024     (H) 04/13/2024     (H) 04/13/2024     COAGS:   Lab Results   Component Value Date    PTT 42 (H) 04/13/2024    INR 1.81 (H) 04/13/2024    FIBR 161 (L) 04/13/2024     POC:   Lab Results   Component Value Date    BGM 86 01/31/2006     HEPATIC:   Lab Results   Component Value Date    ALBUMIN 2.9 (L) 04/13/2024    PROTTOTAL 4.2 (L) 04/13/2024    ALT 30 04/13/2024    AST 69 (H) 04/13/2024    ALKPHOS 44 04/13/2024    BILITOTAL 0.3 04/13/2024     OTHER:   Lab Results   Component Value Date    PH 7.36 04/13/2024    LACT 4.8 (HH) 04/13/2024    ALEXANDRO 8.0 (L) 04/13/2024    ALEXANDRO 8.0 (L) 04/13/2024    PHOS 3.4 04/13/2024    MAG 1.8 04/13/2024    LIPASE 69 (H) 04/13/2024    TSH 3.28 09/29/2021    CRP 3.5  10/01/2021    SED 37 (H) 07/31/2013       Anesthesia Plan    ASA Status:  5, emergent    NPO Status:  NPO Appropriate    Anesthesia Type: General.     - Airway: ETT   Induction: RSI.   Maintenance: Balanced.   Techniques and Equipment:     - Lines/Monitors: 2nd IV, Arterial Line, BIS     - Blood: T&C, PRBC, Blood in Room     - Drips/Meds: Phenylephrine     Consents    Anesthesia Plan(s) and associated risks, benefits, and realistic alternatives discussed. Questions answered and patient/representative(s) expressed understanding.     - Discussed:     - Discussed with:  Implied consent/emergency      - Extended Intubation/Ventilatory Support Discussed: No.      - Patient is DNR/DNI Status: No     Use of blood products discussed: No .     Postoperative Care    Pain management: IV analgesics, Oral pain medications, Multi-modal analgesia.   PONV prophylaxis: Ondansetron (or other 5HT-3), Dexamethasone or Solumedrol     Comments:               Sonido oGnzalez MD    I have reviewed the pertinent notes and labs in the chart from the past 30 days and (re)examined the patient.  Any updates or changes from those notes are reflected in this note.    # Hypokalemia: Lowest K = 3.2 mmol/L in last 2 days, will replace as needed  # Hypernatremia: Highest Na = 146 mmol/L in last 30 days, will monitor as appropriate   # Hypocalcemia: Lowest iCa = 3.9 mg/dL in last 2 days, will monitor and replace as appropriate    # Hypoalbuminemia: Lowest albumin = 2.9 g/dL at 4/13/2024  2:31 PM, will monitor as appropriate   # Drug Induced Coagulation Defect: home medication list includes an anticoagulant medication  # Drug Induced Platelet Defect: home medication list includes an antiplatelet medication

## 2024-04-13 NOTE — ANESTHESIA PROCEDURE NOTES
Arterial Line Procedure Note    Pre-Procedure   Staff -        Anesthesiologist:  Donell Nichols MD       Resident/Fellow: Sandro Marion MD       Performed By: resident       Location: OR       Pre-Anesthestic Checklist: patient identified, IV checked, risks and benefits discussed, informed consent, monitors and equipment checked, pre-op evaluation and at physician/surgeon's request  Timeout:       Correct Patient: Yes        Correct Procedure: Yes        Correct Site: Yes        Correct Position: Yes   Line Placement:   This line was placed Post Induction  Procedure   Procedure: arterial line       Diagnosis: hemodynamic monitoring       Laterality: left       Insertion Site: radial.  Sterile Prep        Standard elements of sterile barrier followed       Skin prep: Chloraprep  Insertion/Injection        Catheter Type/Size: 20 G, 1.75 in/4.5 cm quick cath (integral wire)  Narrative        Tegaderm dressing used.       Complications: None apparent,        Arterial waveform: Yes        IBP within 10% of NIBP: Yes

## 2024-04-13 NOTE — H&P
General Surgery H&P  04/13/2024     Date of admission: 4/13/2024    Reason for Admission: closed loop SBO      Assessment/Plan:  88yo woman with afib on eliquis, CKD, asthma who presents with severe abdominal pain and CT showing closed loop obstruction, no perforation. Vitals are normal, exam soft with involuntary guarding, normal WBC and lactate. Will reverse eliquis in ED and start abx. To OR for emergent ex lap.      - OR for ex lap, possible bowel resection  - K-centra  - Ceftriaxone/flagyl   - Further plans pending operative course    Discussed with chief resident and staff    Latanya Salcedo MD  Surgery PGY-3     -----------------------------------------------------------  CC: abdominal pain    HPI: 88yo woman with h/o afib on eliquis, CAD s/p multiple stents, CKD who presents with one day of worsening abdominal pain. No nausea/vomiting, no diarrhea or bloody stools, no fevers or chills. Pain was intolerable so she came to ED.     In ED, vitals are normal, Cr 1.7 (at baseline), WBC normal, normal lactate. CTAP without contrast shows bowel obstruction with mesenteric swirl concerning for closed loop obstruction.     ROS: Negative except mentioned in HPI.     Past Medical Hx:  No history of bleeding or clotting disorders or problems with anesthesia.  Past Medical History:   Diagnosis Date    CAD (coronary artery disease)     CAD s/p PCI+BMS to MRCA 10/2002, PCI to mLCX 2/2003, PCI+DESx2 to pLAD 11/2007, PCI+DESx1 to dRCA 12/2007, PCI+ALVAREZ to RPDA 7/2013; on indefinite DAPT  10/27/2002    10/27/2002: AMI s/p PCI+BMS (3.5x18mm Bx velocity) to mRCA 2/5/2003: Back pain; PCI+stent to mLCX c/b distal embolization/slow flow 4/11/2003: Unstable angina; PCI+stent (Hepacoat velocity) to mLAD 11/27/2007: PCI+DESx2 to pLAD (IVUS w/ calcification of LMCA and ulcerated plaque pLAD, 80% dRCA; also had 80% dLCX, too small to stent) 12/11/2007: Staged PCI. PCI+DESx1 (3.5x13mm Cypher) to dRCA (indefinite DAPT recommended at this  time) 6/27/2008: Angina. mLCX stent 70% ISR. LAD and RCA stents patent. Myocardium at risk from LCX felt to be small, medical management preferred to PCI. 11/10/2009: Angina. Findings unchanged from 6/27/2008, FFR LAD 0.90. Medical management recommended. 7/23/2013: Unstable angina; PCI+ALVAREZ to mPDA. Diagnostic findings: LMCA 40% distal. LAD: pLAD stents patent mLAD 30% ISR dLAD 50% diffuse, D2 diffuse disease. LCX 80% mid ISR. RCA diffuse <30% mid, RPLAs diffuse disease, RPDA 100% occlusion.      Cardiac Pacemaker- Medtronic, dual chamber- NOT dependant 11/28/2007    CKD (chronic kidney disease), stage IV (H)     Hyperlipidemia LDL goal <70     Hypertension     Stented coronary artery 10-    RCA    Stented coronary artery 2-5-2003    LCx    Stented coronary artery 4-    LAD    Stented coronary artery 11-    LAD    Stented coronary artery 12-    RCA    Transient complete heart block (H) 11/28/2007        Medications:  Eliquis  Plavix   Prior to Admission medications    Medication Sig Last Dose Taking? Auth Provider Long Term End Date   allopurinol (ZYLOPRIM) 100 MG tablet Take 1 tablet (100 mg) by mouth daily   Pedro Gomez MD     amLODIPine (NORVASC) 10 MG tablet Take 1 tablet (10 mg) by mouth daily   Pedro Gomez MD Yes    amLODIPine (NORVASC) 10 MG tablet Take 1 tablet (10 mg) by mouth daily  Patient taking differently: Take 5 mg by mouth daily   Tyrell Santoyo MD Yes    apixaban ANTICOAGULANT (ELIQUIS) 2.5 MG tablet Take 1 tablet (2.5 mg) by mouth 2 times daily   Tyrell Santoyo MD     budesonide (PULMICORT) 0.5 MG/2ML neb solution Take 2 mLs (0.5 mg) by nebulization 2 times daily   Chad Blair MD Yes    calcium carbonate 500 mg, elemental, (OSCAL 500) 1250 (500 Ca) MG TABS tablet Take 1 tablet by mouth daily   Reported, Patient     clopidogrel (PLAVIX) 75 MG tablet Take 1 tablet (75 mg) by mouth daily   Pedro Gomez MD Yes     cyanocolbalamin (VITAMIN B-12) 1000 MCG tablet Take 500 mcg by mouth daily As directed   Reported, Patient     FEROSUL 325 (65 Fe) MG tablet TAKE 1 TABLET BY MOUTH EVERY OTHER DAY   Marzena Martin MD     fluticasone-salmeterol (ADVAIR) 250-50 MCG/ACT inhaler INHALE 1 DOSE BY MOUTH TWICE DAILY   Chad Blair MD Yes    furosemide (LASIX) 40 MG tablet Take 1 tablet (40 mg) by mouth daily   Marzena Martin MD Yes    isosorbide mononitrate (ISMO/MONOKET) 20 MG tablet Take 2 tablets (40 mg) by mouth 3 times daily   Kristi Oates, APRN CNP Yes    metoprolol tartrate (LOPRESSOR) 50 MG tablet Take 1 tablet (50 mg) by mouth 2 times daily   Rose Polanco MD Yes    Multiple Vitamins-Minerals (PRESERVISION AREDS 2+MULTI VIT PO) Take by mouth 2 times daily   Reported, Patient     omeprazole (PRILOSEC) 40 MG DR capsule Take 1 capsule (40 mg) by mouth daily   Pedro Gomez MD No    potassium chloride ER (K-TAB/KLOR-CON) 10 MEQ CR tablet Take 1 tablet (10 mEq) by mouth daily   Pedro Gomez MD No    psyllium (METAMUCIL/KONSYL) Packet Take 1 packet by mouth daily   Rose Polanco MD     rosuvastatin (CRESTOR) 20 MG tablet Take 1 tablet (20 mg) by mouth daily for 360 days   Robert Echevarria MD Yes 7/11/24   timolol maleate (TIMOPTIC) 0.5 % ophthalmic solution INSTILL 1 DROP INTO EACH EYE ONCE DAILY IN THE MORNING   Reported, Patient     vitamin D3 25 mcg (1000 units) tablet Take 1 tablet (25 mcg) by mouth every other day   Marzena Martin MD         Past Surgical Hx:  Past Surgical History:   Procedure Laterality Date    CHOLECYSTECTOMY      CV CORONARY ANGIOGRAM N/A 6/17/2022    Procedure: Coronary Angiogram;  Surgeon: Constantine Macias MD;  Location:  [facility]    CV CORONARY ANGIOGRAM N/A 7/17/2023    Procedure: Coronary Angiogram;  Surgeon: Constantine Macias MD;  Location:  [facility]    CV PCI N/A 6/17/2022     Procedure: Percutaneous Coronary Intervention;  Surgeon: Constantine Macias MD;  Location:  [facility]    CV PCI N/A 7/17/2023    Procedure: Percutaneous Coronary Intervention;  Surgeon: Constantine Macias MD;  Location:  [facility]    CV PCI N/A 11/6/2023    Procedure: Percutaneous Coronary Intervention;  Surgeon: Constantine Macias MD;  Location:  [facility]    CV RIGHT HEART CATH MEASUREMENTS RECORDED N/A 6/17/2022    Procedure: Right Heart Catheterization;  Surgeon: Constantine Macias MD;  Location:  [facility]    EP PACEMAKER N/A 10/15/2019    Procedure: EP PACEMAKER;  Surgeon: Anthony Zhu MD;  Location:  [facility]    ESOPHAGOSCOPY, GASTROSCOPY, DUODENOSCOPY (EGD), COMBINED N/A 7/20/2023    Procedure: Esophagoscopy, gastroscopy, duodenoscopy (EGD), combined;  Surgeon: Bekah Dash DO;  Location:  GI    HC PPM INSERTION NEW/REPLACEMENT W/ ATRIAL&VENTRICULAR LEAD  11-    HYSTERECTOMY          Family Hx:  Family History   Problem Relation Age of Onset    Cancer Sister 82        bladder cancer        Social Hx:  Social History     Tobacco Use    Smoking status: Former     Current packs/day: 0.30     Average packs/day: 0.3 packs/day for 15.0 years (4.5 ttl pk-yrs)     Types: Cigarettes    Smokeless tobacco: Never    Tobacco comments:     Quit 35+ years ago   Substance Use Topics    Drug use: No        Vitals: Temp: 97.8  F (36.6  C) Temp src: Oral BP: (!) 190/69 Pulse: 62   Resp: 18 SpO2: 99 % O2 Device: None (Room air)       Exam:  General: awake, interactive, appears uncomfortable  Resp: breathing comfortably on room air, no respiratory distress  CV: RR, appears well perfused  GI: abdomen soft, moderately distended, severe tenderness to palpation of periumbilical area with guarding, no rebound tenderness  Extremities: wwp  Neuro: A&O, cranial nerves grossly intact  Psych: appropriate  affect    Labs/Imaging:  Results for orders placed or performed during the hospital encounter of 04/13/24 (from the past 24 hour(s))   EKG 12-lead, tracing only   Result Value Ref Range    Systolic Blood Pressure  mmHg    Diastolic Blood Pressure  mmHg    Ventricular Rate 65 BPM    Atrial Rate 65 BPM    NV Interval 234 ms    QRS Duration 84 ms     ms    QTc 430 ms    P Axis 77 degrees    R AXIS 37 degrees    T Axis 65 degrees    Interpretation ECG       Atrial-paced rhythm with prolonged AV conduction with occasional Premature ventricular complexes  Abnormal ECG     CBC with platelets differential    Narrative    The following orders were created for panel order CBC with platelets differential.  Procedure                               Abnormality         Status                     ---------                               -----------         ------                     CBC with platelets and d...[[id number]]  Abnormal            Final result               Manual Differential[[id number]]          Abnormal            Final result                 Please view results for these tests on the individual orders.   Comprehensive metabolic panel   Result Value Ref Range    Sodium 145 135 - 145 mmol/L    Potassium 3.2 (L) 3.4 - 5.3 mmol/L    Carbon Dioxide (CO2) 24 22 - 29 mmol/L    Anion Gap 16 (H) 7 - 15 mmol/L    Urea Nitrogen 30.2 (H) 8.0 - 23.0 mg/dL    Creatinine 1.81 (H) 0.51 - 0.95 mg/dL    GFR Estimate 27 (L) >60 mL/min/1.73m2    Calcium 9.5 8.8 - 10.2 mg/dL    Chloride 105 98 - 107 mmol/L    Glucose 94 70 - 99 mg/dL    Alkaline Phosphatase 84 40 - 150 U/L    AST 29 0 - 45 U/L    ALT 13 0 - 50 U/L    Protein Total 7.8 6.4 - 8.3 g/dL    Albumin 4.5 3.5 - 5.2 g/dL    Bilirubin Total 0.4 <=1.2 mg/dL   Lipase   Result Value Ref Range    Lipase 69 (H) 13 - 60 U/L   Lactic acid whole blood   Result Value Ref Range    Lactic Acid 1.1 0.7 - 2.0 mmol/L   CBC with platelets and differential   Result Value Ref Range    WBC  Count 4.9 4.0 - 11.0 10e3/uL    RBC Count 3.89 3.80 - 5.20 10e6/uL    Hemoglobin 10.8 (L) 11.7 - 15.7 g/dL    Hematocrit 35.0 35.0 - 47.0 %    MCV 90 78 - 100 fL    MCH 27.8 26.5 - 33.0 pg    MCHC 30.9 (L) 31.5 - 36.5 g/dL    RDW 17.2 (H) 10.0 - 15.0 %    Platelet Count 77 (L) 150 - 450 10e3/uL    % Neutrophils      % Lymphocytes      % Monocytes      % Eosinophils      % Basophils      % Immature Granulocytes      NRBCs per 100 WBC 0 <1 /100    Absolute Neutrophils      Absolute Lymphocytes      Absolute Monocytes      Absolute Eosinophils      Absolute Basophils      Absolute Immature Granulocytes      Absolute NRBCs 0.0 10e3/uL   Magnesium   Result Value Ref Range    Magnesium 1.9 1.7 - 2.3 mg/dL   Manual Differential   Result Value Ref Range    % Neutrophils 29 %    % Lymphocytes 51 %    % Monocytes 16 %    % Eosinophils 3 %    % Basophils 1 %    Absolute Neutrophils 1.4 (L) 1.6 - 8.3 10e3/uL    Absolute Lymphocytes 2.5 0.8 - 5.3 10e3/uL    Absolute Monocytes 0.8 0.0 - 1.3 10e3/uL    Absolute Eosinophils 0.1 0.0 - 0.7 10e3/uL    Absolute Basophils 0.0 0.0 - 0.2 10e3/uL    RBC Morphology Confirmed RBC Indices     Platelet Assessment (A) Automated Count Confirmed. Platelet morphology is normal.     Automated Count Confirmed. Giant platelets are present.    Reading Cells Slight (A) None Seen    Polychromasia Slight (A) None Seen    Reactive Lymphocytes Present (A) None Seen    Target Cells Marked (A) None Seen    Teardrop Cells Slight (A) None Seen   CT Abdomen Pelvis w/o Contrast    Narrative    EXAM: CT ABDOMEN AND PELVIS WITHOUT CONTRAST  LOCATION: [facility]  DATE: 04/13/2024    INDICATION: Mid and left-sided abdominal pain.  COMPARISON: 03/18/2024.  TECHNIQUE: CT scan of the abdomen and pelvis was performed without IV contrast. Multiplanar reformats were obtained. Dose reduction techniques were used.  CONTRAST: None.    FINDINGS:   LOWER CHEST: Cardiac  enlargement.    HEPATOBILIARY: Liver is negative. Gallbladder is absent. No biliary dilatation.    PANCREAS: Normal.    SPLEEN: Normal.    ADRENAL GLANDS: Normal.    KIDNEYS/BLADDER: Stable noncontrast appearance of the kidneys. A few small benign-appearing bilateral renal cysts are present. Cortical scarring and atrophy in the kidneys bilaterally, left greater than right, stable in appearance. Lobulated renal   contours bilaterally. No urinary calculi or hydronephrosis. Bladder is unremarkable.    BOWEL: Multiple dilated fluid-filled loops of small bowel seen in the left side of the abdomen with decompression of the distal most small bowel. More proximal loops of small bowel are also relatively decompressed. Focal abrupt narrowing of two adjacent   loops of small bowel seen in the left upper quadrant best appreciated on series 3 image 32. There is some questionable wall thickening of the dilated loops of bowel as well as mesenteric edema present. This appearance could potentially be seen with   closed loop obstruction and cannot exclude developing ischemic change. No bowel wall pneumatosis or free air. Colonic diverticulosis without evidence for diverticulitis.    LYMPH NODES: Normal.    VASCULATURE: Aortic calcification without aneurysm. IVC filter in place. Small amount of free fluid in the left upper quadrant about the spleen.    PELVIC ORGANS: Largely obscured by streak artifact from bilateral hip arthroplasties. Uterus appears absent.    MUSCULOSKELETAL: Mild degenerative changes lumbar spine. Bilateral hip arthroplasties.      Impression    IMPRESSION:   1.  Evidence for mechanical small bowel obstruction as detailed above. Configuration raises concern for potential closed-loop obstruction. There appears be some mild bowel wall thickening as well as mesenteric edema present. Cannot exclude developing   ischemic change. No bowel wall pneumatosis or free air. Correlation with serum lactate levels and recommend  surgical consultation for further management options.    2.  Findings discussed with Dr. Tate on 04/13/2024 at 3:59 AM CDT.       *Note: Due to a large number of results and/or encounters for the requested time period, some results have not been displayed. A complete set of results can be found in Results Review.

## 2024-04-13 NOTE — ANESTHESIA PROCEDURE NOTES
Airway         Procedure Start/Stop Times: 4/13/2024 11:33 AM  Staff -        Anesthesiologist:  Donell Nichols MD       Resident/Fellow: Sonido Gonzalez MD       Other Anesthesia Staff: Sandro Marion MD       Performed By: residentIndications and Patient Condition       Indications for airway management: caridad-procedural       Induction type:RSI       Mask difficulty assessment: 0 - not attempted    Final Airway Details       Final airway type: endotracheal airway       Successful airway: ETT - single  Endotracheal Airway Details        ETT size (mm): 7.0       Cuffed: yes       Successful intubation technique: direct laryngoscopy       DL Blade Type: MAC 3       Grade View of Cords: 2       Adjucts: stylet       Position: Right       Measured from: gums/teeth       Secured at (cm): 21       Bite block used: None    Post intubation assessment        Placement verified by: capnometry, equal breath sounds and chest rise        Number of attempts at approach: 1       Number of other approaches attempted: 0       Secured with: tape       Ease of procedure: easy       Dentition: Intact and Unchanged    Medication(s) Administered   Medication Administration Time: 4/13/2024 11:33 AM

## 2024-04-13 NOTE — ED PROVIDER NOTES
ED Provider Note  [facility]      History     Chief Complaint   Patient presents with    Abdominal Pain     HPI  Tessa Mansfield is a 89 year old female with history of atrial fibrillation on apixaban, hypertension, renal insufficiency who presents to the emergency department with mid and left-sided abdominal pain for the past day.  Patient states her symptoms began yesterday morning after she ate breakfast.  She has had persistent pain since.  She reports some nausea but denies vomiting.  She denies any diarrhea or constipation.  She states she had a normal bowel movement yesterday.  She denies any dysuria, urgency, or frequency.  She denies any fever.  No cough.  No chest pain or dyspnea.  She has a history of hysterectomy and appendectomy.    Past Medical History  Past Medical History:   Diagnosis Date    CAD (coronary artery disease)     CAD s/p PCI+BMS to MRCA 10/2002, PCI to mLCX 2/2003, PCI+DESx2 to pLAD 11/2007, PCI+DESx1 to dRCA 12/2007, PCI+ALVAREZ to RPDA 7/2013; on indefinite DAPT  10/27/2002    10/27/2002: AMI s/p PCI+BMS (3.5x18mm Bx velocity) to mRCA 2/5/2003: Back pain; PCI+stent to mLCX c/b distal embolization/slow flow 4/11/2003: Unstable angina; PCI+stent (Hepacoat velocity) to mLAD 11/27/2007: PCI+DESx2 to pLAD (IVUS w/ calcification of LMCA and ulcerated plaque pLAD, 80% dRCA; also had 80% dLCX, too small to stent) 12/11/2007: Staged PCI. PCI+DESx1 (3.5x13mm Cypher) to dRCA (indefinite DAPT recommended at this time) 6/27/2008: Angina. mLCX stent 70% ISR. LAD and RCA stents patent. Myocardium at risk from LCX felt to be small, medical management preferred to PCI. 11/10/2009: Angina. Findings unchanged from 6/27/2008, FFR LAD 0.90. Medical management recommended. 7/23/2013: Unstable angina; PCI+ALVAREZ to mPDA. Diagnostic findings: LMCA 40% distal. LAD: pLAD stents patent mLAD 30% ISR dLAD 50% diffuse, D2 diffuse disease. LCX 80% mid ISR. RCA diffuse <30% mid, RPLAs diffuse  disease, RPDA 100% occlusion.      Cardiac Pacemaker- [employer]tronic, dual chamber- NOT dependant 11/28/2007    CKD (chronic kidney disease), stage IV (H)     Hyperlipidemia LDL goal <70     Hypertension     Stented coronary artery 10-    RCA    Stented coronary artery 2-5-2003    LCx    Stented coronary artery 4-    LAD    Stented coronary artery 11-    LAD    Stented coronary artery 12-    RCA    Transient complete heart block (H) 11/28/2007     Past Surgical History:   Procedure Laterality Date    CHOLECYSTECTOMY      CV CORONARY ANGIOGRAM N/A 6/17/2022    Procedure: Coronary Angiogram;  Surgeon: Constantine Macias MD;  Location: [facility] CARDIAC CATH LAB    CV CORONARY ANGIOGRAM N/A 7/17/2023    Procedure: Coronary Angiogram;  Surgeon: Constantine Macias MD;  Location: [facility] CARDIAC CATH LAB    CV PCI N/A 6/17/2022    Procedure: Percutaneous Coronary Intervention;  Surgeon: Constantine Macias MD;  Location: [facility] CARDIAC CATH LAB    CV PCI N/A 7/17/2023    Procedure: Percutaneous Coronary Intervention;  Surgeon: Constantine Macias MD;  Location: [facility] CARDIAC CATH LAB    CV PCI N/A 11/6/2023    Procedure: Percutaneous Coronary Intervention;  Surgeon: Constantine Macias MD;  Location: [facility] CARDIAC CATH LAB    CV RIGHT HEART CATH MEASUREMENTS RECORDED N/A 6/17/2022    Procedure: Right Heart Catheterization;  Surgeon: Constantine Macias MD;  Location: [facility] CARDIAC CATH LAB    EP PACEMAKER N/A 10/15/2019    Procedure: EP PACEMAKER;  Surgeon: Anthony Zhu MD;  Location: [facility] CARDIAC CATH LAB    ESOPHAGOSCOPY, GASTROSCOPY, DUODENOSCOPY (EGD), COMBINED N/A 7/20/2023    Procedure: Esophagoscopy, gastroscopy, duodenoscopy (EGD), combined;  Surgeon: Bekah Dash DO;  Location:  GI    HC PPM INSERTION NEW/REPLACEMENT W/ ATRIAL&VENTRICULAR LEAD  11-    HYSTERECTOMY       allopurinol (ZYLOPRIM) 100 MG tablet  amLODIPine (NORVASC) 10 MG  "tablet  amLODIPine (NORVASC) 10 MG tablet  apixaban ANTICOAGULANT (ELIQUIS) 2.5 MG tablet  budesonide (PULMICORT) 0.5 MG/2ML neb solution  calcium carbonate 500 mg, elemental, (OSCAL 500) 1250 (500 Ca) MG TABS tablet  clopidogrel (PLAVIX) 75 MG tablet  cyanocolbalamin (VITAMIN B-12) 1000 MCG tablet  FEROSUL 325 (65 Fe) MG tablet  fluticasone-salmeterol (ADVAIR) 250-50 MCG/ACT inhaler  furosemide (LASIX) 40 MG tablet  isosorbide mononitrate (ISMO/MONOKET) 20 MG tablet  metoprolol tartrate (LOPRESSOR) 50 MG tablet  Multiple Vitamins-Minerals (PRESERVISION AREDS 2+MULTI VIT PO)  omeprazole (PRILOSEC) 40 MG DR capsule  potassium chloride ER (K-TAB/KLOR-CON) 10 MEQ CR tablet  psyllium (METAMUCIL/KONSYL) Packet  rosuvastatin (CRESTOR) 20 MG tablet  timolol maleate (TIMOPTIC) 0.5 % ophthalmic solution  vitamin D3 25 mcg (1000 units) tablet      Allergies   Allergen Reactions    Ciprofloxacin Rash    Codeine Sulfate Itching    Shrimp Swelling    Bactrim [Sulfamethoxazole W/Trimethoprim]      Patient unable to recall    Biaxin [Clarithromycin]     Chlorthalidone Nausea and Vomiting    Clonidine     Darvon [Propoxyphene Hcl]     Dilaudid [Hydromorphone] Visual Disturbance and Hallucination    Gabapentin Fatigue and Confusion     \"felt drunk\"    Indomethacin     Levaquin [Levofloxacin Hemihydrate]     Morphine Sulfate     Percocet [Oxycodone-Acetaminophen] Hallucination    Pregabalin Itching and Fatigue    Simvastatin Palpitations     Muscle weakness, leg cramping      Spironolactone      Dehydrated      Terazosin      Family History  Family History   Problem Relation Age of Onset    Cancer Sister 82        bladder cancer     Social History   Social History     Tobacco Use    Smoking status: Former     Current packs/day: 0.30     Average packs/day: 0.3 packs/day for 15.0 years (4.5 ttl pk-yrs)     Types: Cigarettes    Smokeless tobacco: Never    Tobacco comments:     Quit 35+ years ago   Substance Use Topics    Drug use: No    " "     A medically appropriate review of systems was performed with pertinent positives and negatives noted in the HPI, and all other systems negative.    Physical Exam   BP: (!) 190/69  Pulse: 62  Temp: 97.8  F (36.6  C)  Resp: 18  Height: 167.6 cm (5' 6\")  Weight: 69.4 kg (153 lb)  SpO2: 99 %  Physical Exam  Vitals and nursing note reviewed.   Constitutional:       General: She is not in acute distress.     Appearance: She is not diaphoretic.   HENT:      Head: Normocephalic and atraumatic.   Eyes:      Extraocular Movements: Extraocular movements intact.      Conjunctiva/sclera: Conjunctivae normal.   Cardiovascular:      Rate and Rhythm: Normal rate.      Heart sounds: Normal heart sounds.   Pulmonary:      Effort: Pulmonary effort is normal. No respiratory distress.      Breath sounds: Normal breath sounds.   Abdominal:      Palpations: Abdomen is soft.      Tenderness: There is abdominal tenderness in the epigastric area, left upper quadrant and left lower quadrant.   Musculoskeletal:         General: No tenderness. Normal range of motion.      Cervical back: Normal range of motion and neck supple.   Skin:     General: Skin is warm and dry.      Findings: No rash.           ED Course, Procedures, & Data      Procedures            EKG Interpretation:      Interpreted by FERMIN JOSHUA MD, MD  Time reviewed: 0307  Symptoms at time of EKG: Abdominal pain  Rhythm: paced  Rate: 65  Axis: Normal  Ectopy: premature ventricular contractions (unifocal)  Conduction: normal  ST Segments/ T Waves: No acute ischemic changes  Q Waves: none  Comparison to prior: Unchanged    Clinical Impression: paced rhythm       Results for orders placed or performed during the hospital encounter of 04/13/24   CT Abdomen Pelvis w/o Contrast     Status: None    Narrative    EXAM: CT ABDOMEN AND PELVIS WITHOUT CONTRAST  LOCATION: [facility]  DATE: 04/13/2024    INDICATION: Mid and left-sided abdominal " pain.  COMPARISON: 03/18/2024.  TECHNIQUE: CT scan of the abdomen and pelvis was performed without IV contrast. Multiplanar reformats were obtained. Dose reduction techniques were used.  CONTRAST: None.    FINDINGS:   LOWER CHEST: Cardiac enlargement.    HEPATOBILIARY: Liver is negative. Gallbladder is absent. No biliary dilatation.    PANCREAS: Normal.    SPLEEN: Normal.    ADRENAL GLANDS: Normal.    KIDNEYS/BLADDER: Stable noncontrast appearance of the kidneys. A few small benign-appearing bilateral renal cysts are present. Cortical scarring and atrophy in the kidneys bilaterally, left greater than right, stable in appearance. Lobulated renal   contours bilaterally. No urinary calculi or hydronephrosis. Bladder is unremarkable.    BOWEL: Multiple dilated fluid-filled loops of small bowel seen in the left side of the abdomen with decompression of the distal most small bowel. More proximal loops of small bowel are also relatively decompressed. Focal abrupt narrowing of two adjacent   loops of small bowel seen in the left upper quadrant best appreciated on series 3 image 32. There is some questionable wall thickening of the dilated loops of bowel as well as mesenteric edema present. This appearance could potentially be seen with   closed loop obstruction and cannot exclude developing ischemic change. No bowel wall pneumatosis or free air. Colonic diverticulosis without evidence for diverticulitis.    LYMPH NODES: Normal.    VASCULATURE: Aortic calcification without aneurysm. IVC filter in place. Small amount of free fluid in the left upper quadrant about the spleen.    PELVIC ORGANS: Largely obscured by streak artifact from bilateral hip arthroplasties. Uterus appears absent.    MUSCULOSKELETAL: Mild degenerative changes lumbar spine. Bilateral hip arthroplasties.      Impression    IMPRESSION:   1.  Evidence for mechanical small bowel obstruction as detailed above. Configuration raises concern for potential  closed-loop obstruction. There appears be some mild bowel wall thickening as well as mesenteric edema present. Cannot exclude developing   ischemic change. No bowel wall pneumatosis or free air. Correlation with serum lactate levels and recommend surgical consultation for further management options.    2.  Findings discussed with Dr. Tate on 04/13/2024 at 3:59 AM CDT.     Comprehensive metabolic panel     Status: Abnormal   Result Value Ref Range    Sodium 145 135 - 145 mmol/L    Potassium 3.2 (L) 3.4 - 5.3 mmol/L    Carbon Dioxide (CO2) 24 22 - 29 mmol/L    Anion Gap 16 (H) 7 - 15 mmol/L    Urea Nitrogen 30.2 (H) 8.0 - 23.0 mg/dL    Creatinine 1.81 (H) 0.51 - 0.95 mg/dL    GFR Estimate 27 (L) >60 mL/min/1.73m2    Calcium 9.5 8.8 - 10.2 mg/dL    Chloride 105 98 - 107 mmol/L    Glucose 94 70 - 99 mg/dL    Alkaline Phosphatase 84 40 - 150 U/L    AST 29 0 - 45 U/L    ALT 13 0 - 50 U/L    Protein Total 7.8 6.4 - 8.3 g/dL    Albumin 4.5 3.5 - 5.2 g/dL    Bilirubin Total 0.4 <=1.2 mg/dL   Lipase     Status: Abnormal   Result Value Ref Range    Lipase 69 (H) 13 - 60 U/L   Lactic acid whole blood     Status: Normal   Result Value Ref Range    Lactic Acid 1.1 0.7 - 2.0 mmol/L   CBC with platelets and differential     Status: Abnormal (Preliminary result)   Result Value Ref Range    WBC Count 4.9 4.0 - 11.0 10e3/uL    RBC Count 3.89 3.80 - 5.20 10e6/uL    Hemoglobin 10.8 (L) 11.7 - 15.7 g/dL    Hematocrit 35.0 35.0 - 47.0 %    MCV 90 78 - 100 fL    MCH 27.8 26.5 - 33.0 pg    MCHC 30.9 (L) 31.5 - 36.5 g/dL    RDW 17.2 (H) 10.0 - 15.0 %    Platelet Count 77 (L) 150 - 450 10e3/uL    % Neutrophils      % Lymphocytes      % Monocytes      % Eosinophils      % Basophils      % Immature Granulocytes      Absolute Neutrophils      Absolute Lymphocytes      Absolute Monocytes      Absolute Eosinophils      Absolute Basophils      Absolute Immature Granulocytes     EKG 12-lead, tracing only     Status: None (Preliminary result)    Result Value Ref Range    Systolic Blood Pressure  mmHg    Diastolic Blood Pressure  mmHg    Ventricular Rate 65 BPM    Atrial Rate 65 BPM    NC Interval 234 ms    QRS Duration 84 ms     ms    QTc 430 ms    P Axis 77 degrees    R AXIS 37 degrees    T Axis 65 degrees    Interpretation ECG       Atrial-paced rhythm with prolonged AV conduction with occasional Premature ventricular complexes  Abnormal ECG     CBC with platelets differential     Status: Abnormal (In process)    Narrative    The following orders were created for panel order CBC with platelets differential.  Procedure                               Abnormality         Status                     ---------                               -----------         ------                     CBC with platelets and d...[[id number]]  Abnormal            Preliminary result           Please view results for these tests on the individual orders.     Medications   sodium chloride 0.9% BOLUS 500 mL (500 mLs Intravenous $New Bag 4/13/24 0402)   potassium chloride 10 mEq in 100 mL sterile water infusion (10 mEq Intravenous $New Bag 4/13/24 0500)   cefTRIAXone (ROCEPHIN) 1 g vial to attach to  mL bag for ADULTS or NS 50 mL bag for PEDS (has no administration in time range)   metroNIDAZOLE (FLAGYL) infusion 500 mg (has no administration in time range)   fentaNYL (PF) (SUBLIMAZE) injection 25 mcg (has no administration in time range)   prothrombin 4 factor complex concentrate (KCENTRA) infusion 3,470 Units (has no administration in time range)   fentaNYL (PF) (SUBLIMAZE) injection 25 mcg (25 mcg Intravenous $Given 4/13/24 0403)   ondansetron (ZOFRAN) injection 4 mg (4 mg Intravenous $Given 4/13/24 0403)     Labs Ordered and Resulted from Time of ED Arrival to Time of ED Departure   COMPREHENSIVE METABOLIC PANEL - Abnormal       Result Value    Sodium 145      Potassium 3.2 (*)     Carbon Dioxide (CO2) 24      Anion Gap 16 (*)     Urea Nitrogen 30.2 (*)     Creatinine  1.81 (*)     GFR Estimate 27 (*)     Calcium 9.5      Chloride 105      Glucose 94      Alkaline Phosphatase 84      AST 29      ALT 13      Protein Total 7.8      Albumin 4.5      Bilirubin Total 0.4     LIPASE - Abnormal    Lipase 69 (*)    CBC WITH PLATELETS AND DIFFERENTIAL - Abnormal    WBC Count 4.9      RBC Count 3.89      Hemoglobin 10.8 (*)     Hematocrit 35.0      MCV 90      MCH 27.8      MCHC 30.9 (*)     RDW 17.2 (*)     Platelet Count 77 (*)     % Neutrophils        % Lymphocytes        % Monocytes        % Eosinophils        % Basophils        % Immature Granulocytes        Absolute Neutrophils        Absolute Lymphocytes        Absolute Monocytes        Absolute Eosinophils        Absolute Basophils        Absolute Immature Granulocytes       LACTIC ACID WHOLE BLOOD - Normal    Lactic Acid 1.1     ROUTINE UA WITH MICROSCOPIC REFLEX TO CULTURE   MAGNESIUM     CT Abdomen Pelvis w/o Contrast   Final Result   IMPRESSION:    1.  Evidence for mechanical small bowel obstruction as detailed above. Configuration raises concern for potential closed-loop obstruction. There appears be some mild bowel wall thickening as well as mesenteric edema present. Cannot exclude developing    ischemic change. No bowel wall pneumatosis or free air. Correlation with serum lactate levels and recommend surgical consultation for further management options.      2.  Findings discussed with Dr. Tate on 04/13/2024 at 3:59 AM CDT.            Reviewed recent emergency department visit and hospital admission for syncope and acute cystitis.  Reviewed labs including CBC, comprehensive metabolic panel, urinalysis, urine culture.    Critical care was not performed.     Medical Decision Making  The patient's presentation was of moderate complexity (an undiagnosed new problem with uncertain diagnosis).    The patient's evaluation involved:  review of external note(s) from 2 sources (see separate area of note for details)  review of 3+  test result(s) ordered prior to this encounter (see separate area of note for details)  ordering and/or review of 3+ test(s) in this encounter (see separate area of note for details)  independent interpretation of testing performed by another health professional (EKG interpreted by me with above results)  discussion of management or test interpretation with another health professional (general surgery)    The patient's management necessitated moderate risk (a decision regarding minor procedure (NG tube decompression) with risk factors of bleeding) and high risk (a decision regarding hospitalization).    Assessment & Plan    89 year old female with history of atrial fibrillation, sick sinus syndrome, hysterectomy and appendectomy to the emergency department with abdominal pain and distention.  She has epigastric and left-sided abdominal tenderness on exam without peritonitis.  She is mildly hypertensive but otherwise has normal vital signs.  Her labs are remarkable for normal white count and lactate level, mild hypokalemia, and stable renal insufficiency.  Abdominal CT is remarkable for mechanical small bowel obstruction with concern for closed-loop component.  NG-tube placed.  General surgery consulted.  Patient will be taken to the OR for exploratory laparotomy.  Kcentra given due to need for emergent surgery.  Potassium replacement initiated.    I have reviewed the nursing notes. I have reviewed the findings, diagnosis, plan and need for follow up with the patient.    New Prescriptions    No medications on file       Final diagnoses:   Small bowel obstruction (H)       Kenny Tate Md    Chart documentation was completed with Dragon voice-recognition software. Even though reviewed, this chart may still contain some grammatical, spelling, and word errors.     [facility] EMERGENCY DEPARTMENT  4/13/2024     Kenny Tate MD  04/13/24 [id number]

## 2024-04-13 NOTE — ANESTHESIA CARE TRANSFER NOTE
Patient: Tessa Manfsield    Procedure: Procedure(s):  Laparotomy exploratory, Wound Vac Placement.       Diagnosis: Bleeding [R58]  Diagnosis Additional Information: No value filed.    Anesthesia Type:   No value filed.     Note:    Oropharynx: ventilatory support, nasal gatric tube in place and endotracheal tube in place  Level of Consciousness: iatrogenic sedation      Independent Airway: airway patency not satisfactory and stable  Dentition: dentition unchanged  Vital Signs Stable: post-procedure vital signs reviewed and stable  Report to RN Given: handoff report given  Patient transferred to: PACU  Comments: VSS. Mechanically ventilated at a regular rate with adequate tidal volumes and maintaining O2 sats. Denies nausea or pain. No apparent complications from anesthesia. Transfused with 3 U pRBC intraoperatively and 1U platelets. Transported to PACU on phenylephrine and propofol gtt. ABL 90 and quantra labs to monitor and correct coagulopathies and anemia.     Sonido Gonzalez MD   Anesthesia CA-1    Handoff Report: Identifed the Patient, Identified the Reponsible Provider, Reviewed the pertinent medical history, Discussed the surgical course, Reviewed Intra-OP anesthesia mangement and issues during anesthesia, Set expectations for post-procedure period and Allowed opportunity for questions and acknowledgement of understanding      Vitals:  Vitals Value Taken Time   /62 04/13/24 1330   Temp     Pulse 65 04/13/24 1331   Resp 13 04/13/24 1331   SpO2 100 % 04/13/24 1331   Vitals shown include unfiled device data.    Electronically Signed By: Sonido Gonzalez MD  April 13, 2024  1:32 PM

## 2024-04-13 NOTE — OR NURSING
Updated Dr. Nichols and Dr. Marion on patient condition. Sustained hypotension MAP <70. 500mL Albumin has been infused. Urine output 0mL for the hour. CBC and BMP in process. Order to start phenylephrine gtt.

## 2024-04-13 NOTE — ANESTHESIA POSTPROCEDURE EVALUATION
Patient: Tessa Mansfield    Procedure: Procedure(s):  Exploratory Laparotomy       Anesthesia Type:  General    Note:  Disposition: Disposition Change/Cancellation; Inpatient   Postop Pain Control:    PONV:    Neuro/Psych:    Airway/Respiratory:             Sign Out: AIRWAY IN SITU/Resp. Support   CV/Hemodynamics:    Other NRE:    DID A NON-ROUTINE EVENT OCCUR? YES    Event details/Postop Comments:  Emergent return to OR for reexploration, severe anemia and hypotension.  See postop note for 2nd op.             Last vitals:  Vitals Value Taken Time   BP 94/45 04/13/24 1110   Temp 36.5  C (97.7  F) 04/13/24 0805   Pulse 64 04/13/24 1110   Resp 21 04/13/24 1110   SpO2 96 % 04/13/24 1110       Electronically Signed By: Donell Nichols MD  April 13, 2024  2:03 PM

## 2024-04-13 NOTE — ANESTHESIA POSTPROCEDURE EVALUATION
Patient: Tessa Mansfield    Procedure: Procedure(s):  Laparotomy exploratory, Wound Vac Placement.       Anesthesia Type:  General    Note:  Disposition: ICU            ICU Sign Out: Anesthesiologist/ICU physician sign out WAS performed   Postop Pain Control:    PONV: No   Neuro/Psych:             Sign Out: PLANNED postop sedation   Airway/Respiratory:             Sign Out: AIRWAY IN SITU/Resp. Support               Airway in situ/Resp. Support: ETT   CV/Hemodynamics:             Sign Out: Acceptable CV status   Other NRE: NONE   DID A NON-ROUTINE EVENT OCCUR? YES    Event details/Postop Comments:  Extubated at end of earlier ExLap, Persistant hypotension in PACU, Rx'd witih crystalloid then 500 albumin - transient improvement then decrease back to 80s systolic.  Poor urine output. Phenylephrine started.   Then Hgb return value 5.2.  Surgeon contacted - emergent return to OR.  2 Units of uncrossmatched Oneg blood ordered as well as 4 Unit crossmatched (no T&S had been sent before).  Into OR, RSI with 50mg of ketamine for induction.   BP after induction improved (still on phenylephrine, minimal VolAgent.  Since hemo stable, did not transfuse uncrossmatched units UNTIL Hgb returned (immediately after art cath placement) at 3.  First unit of uncrossmatched blood given ASAP - at which time crossmatched blood arrived.  2 more units given, as well as platelets (low pts count and abn Quantra).  Abdomen left open, intubated on 30ug/kg/min of propofol (and unreversed) to PACU.  Still Hemo stable on 0.3 phenyl.  Lab Hgb 7.5 with falling lactate.  Elected to give one more unit PRBC before transfer to ICU.  Repeat Quantra pending.             Last vitals:  Vitals Value Taken Time   /60 04/13/24 1400   Temp 35.7  C (96.3  F) 04/13/24 1330   Pulse 62 04/13/24 1406   Resp 12 04/13/24 1406   SpO2 100 % 04/13/24 1406   Vitals shown include unfiled device data.    Electronically Signed By: Donell Nichols MD  April 13,  2024  2:06 PM

## 2024-04-13 NOTE — ANESTHESIA CARE TRANSFER NOTE
Patient: Tessa Mansfield    Procedure: Procedure(s):  Exploratory Laparotomy       Diagnosis: Bowel obstruction (H) [K56.609]  Diagnosis Additional Information: No value filed.    Anesthesia Type:   General     Note:    Oropharynx: nasal gatric tube in place and spontaneously breathing  Level of Consciousness: drowsy and awake  Oxygen Supplementation: face mask  Level of Supplemental Oxygen (L/min / FiO2): 8  Independent Airway: airway patency satisfactory and stable  Dentition: dentition unchanged  Vital Signs Stable: post-procedure vital signs reviewed and stable  Report to RN Given: handoff report given  Patient transferred to: PACU  Comments: VSS. Breathing spontaneously at a regular rate with adequate tidal volumes and maintaining O2 sats. Denies nausea or pain. No apparent complications from anesthesia. NG present in left nare.    Sonido Gonzalez MD   Anesthesia CA-1    Handoff Report: Identifed the Patient, Identified the Reponsible Provider, Reviewed the pertinent medical history, Discussed the surgical course, Reviewed Intra-OP anesthesia mangement and issues during anesthesia, Set expectations for post-procedure period and Allowed opportunity for questions and acknowledgement of understanding      Vitals:  Vitals Value Taken Time   BP     Temp     Pulse 64 04/13/24 0807   Resp 10 04/13/24 0807   SpO2 100 % 04/13/24 0807   Vitals shown include unfiled device data.    Electronically Signed By: Sonido Gonzalez MD  April 13, 2024  8:07 AM

## 2024-04-13 NOTE — OR NURSING
Paged Dr Nichols regarding sustained hypotension. Blood pressure MAP less than goal <70. LR bolus completed. Dr. Nichols to come to bedside to assess

## 2024-04-13 NOTE — ANESTHESIA PREPROCEDURE EVALUATION
Anesthesia Pre-Procedure Evaluation    Patient: Tessa Mansfield   MRN: [id number] : 1935        Procedure : Procedure(s):  Exploratory Laparotomy, possible bowel resection          Past Medical History:   Diagnosis Date    CAD (coronary artery disease)     CAD s/p PCI+BMS to MRCA 10/2002, PCI to mLCX 2003, PCI+DESx2 to pLAD 2007, PCI+DESx1 to dRCA 2007, PCI+ALVAREZ to RPDA 2013; on indefinite DAPT  10/27/2002    10/27/2002: AMI s/p PCI+BMS (3.5x18mm Bx velocity) to mRCA 2003: Back pain; PCI+stent to mLCX c/b distal embolization/slow flow 2003: Unstable angina; PCI+stent (Hepacoat velocity) to mLAD 2007: PCI+DESx2 to pLAD (IVUS w/ calcification of LMCA and ulcerated plaque pLAD, 80% dRCA; also had 80% dLCX, too small to stent) 2007: Staged PCI. PCI+DESx1 (3.5x13mm Cypher) to dRCA (indefinite DAPT recommended at this time) 2008: Angina. mLCX stent 70% ISR. LAD and RCA stents patent. Myocardium at risk from LCX felt to be small, medical management preferred to PCI. 11/10/2009: Angina. Findings unchanged from 2008, FFR LAD 0.90. Medical management recommended. 2013: Unstable angina; PCI+ALVAREZ to mPDA. Diagnostic findings: LMCA 40% distal. LAD: pLAD stents patent mLAD 30% ISR dLAD 50% diffuse, D2 diffuse disease. LCX 80% mid ISR. RCA diffuse <30% mid, RPLAs diffuse disease, RPDA 100% occlusion.      Cardiac Pacemaker- Medtronic, dual chamber- NOT dependant 2007    CKD (chronic kidney disease), stage IV (H)     Hyperlipidemia LDL goal <70     Hypertension     Stented coronary artery 10-    RCA    Stented coronary artery 2003    LCx    Stented coronary artery 2003    LAD    Stented coronary artery 2007    LAD    Stented coronary artery 2007    RCA    Transient complete heart block (H) 2007      Past Surgical History:   Procedure Laterality Date    CHOLECYSTECTOMY      CV CORONARY ANGIOGRAM N/A 2022    Procedure: Coronary Angiogram;   "Surgeon: Constantine Macias MD;  Location:  HEART CARDIAC CATH LAB    CV CORONARY ANGIOGRAM N/A 7/17/2023    Procedure: Coronary Angiogram;  Surgeon: Constantine Macias MD;  Location: [facility] CARDIAC CATH LAB    CV PCI N/A 6/17/2022    Procedure: Percutaneous Coronary Intervention;  Surgeon: Constantine Macias MD;  Location: [facility] CARDIAC CATH LAB    CV PCI N/A 7/17/2023    Procedure: Percutaneous Coronary Intervention;  Surgeon: Constantine Macias MD;  Location: [facility] CARDIAC CATH LAB    CV PCI N/A 11/6/2023    Procedure: Percutaneous Coronary Intervention;  Surgeon: Constantine Macias MD;  Location: [facility] CARDIAC CATH LAB    CV RIGHT HEART CATH MEASUREMENTS RECORDED N/A 6/17/2022    Procedure: Right Heart Catheterization;  Surgeon: Constantine Macias MD;  Location:  HEART CARDIAC CATH LAB    EP PACEMAKER N/A 10/15/2019    Procedure: EP PACEMAKER;  Surgeon: Anthony Zhu MD;  Location: [facility] CARDIAC CATH LAB    ESOPHAGOSCOPY, GASTROSCOPY, DUODENOSCOPY (EGD), COMBINED N/A 7/20/2023    Procedure: Esophagoscopy, gastroscopy, duodenoscopy (EGD), combined;  Surgeon: Bekah Dash DO;  Location:  GI    HC PPM INSERTION NEW/REPLACEMENT W/ ATRIAL&VENTRICULAR LEAD  11-    HYSTERECTOMY        Allergies   Allergen Reactions    Ciprofloxacin Rash    Codeine Sulfate Itching    Shrimp Swelling    Bactrim [Sulfamethoxazole W/Trimethoprim]      Patient unable to recall    Biaxin [Clarithromycin]     Chlorthalidone Nausea and Vomiting    Clonidine     Darvon [Propoxyphene Hcl]     Dilaudid [Hydromorphone] Visual Disturbance and Hallucination    Gabapentin Fatigue and Confusion     \"felt drunk\"    Indomethacin     Levaquin [Levofloxacin Hemihydrate]     Morphine Sulfate     Percocet [Oxycodone-Acetaminophen] Hallucination    Pregabalin Itching and Fatigue    Simvastatin Palpitations     Muscle weakness, leg cramping      Spironolactone      Dehydrated      Terazosin     "   Social History     Tobacco Use    Smoking status: Former     Current packs/day: 0.30     Average packs/day: 0.3 packs/day for 15.0 years (4.5 ttl pk-yrs)     Types: Cigarettes    Smokeless tobacco: Never    Tobacco comments:     Quit 35+ years ago   Substance Use Topics    Alcohol use: Not on file      Wt Readings from Last 1 Encounters:   04/13/24 69.4 kg (153 lb)        Anesthesia Evaluation            ROS/MED HX  ENT/Pulmonary:     (+)                      asthma                  Neurologic:       Cardiovascular: Comment: Medtronic [id number] [place] XT  MRINormal Device Function  Mode: AAIR<=>DDDR  bpm  AP: 70.5%  : <0.1%  Presenting EGM: NSR with PVCs @ 92 bpm  Short V-V intervals: 0  Lead Trends Appear Stable: Yes  Estimated battery longevity to RRT = 9.2 years. Battery voltage = 3.00 V.   Atrial Arrhythmia: Total AT/AF time = 9 seconds.  AF Hammett: <0.1%  Anticoagulant: Eliquis  Ventricular Arrhythmia: 0  ER RN Notified of Transmission Results: Yes        (+)  hypertension- -  CAD -  - stent-7/17/23. 3 Drug Eluting Stent. Taking blood thinners Pt has received instructions:  CHF  Last EF: 60%                dysrhythmias, a-fib and Sick Sinus Syndrome,        Previous cardiac testing   Echo: Date: 10/2023 Results:  Interpretation Summary  Global and regional left ventricular function is normal with an EF of 55-60%.  Right ventricular function, chamber size, wall motion, and thickness are  normal.  Mild to moderate mitral insufficiency is present.  Mild tricuspid insufficiency is present.  The right ventricular systolic pressure is 35mmHg above the right atrial  pressure.  The inferior vena cava is normal.  No pericardial effusion is present.    Stress Test:  Date: Results:    ECG Reviewed:  Date: Results:    Cath:  Date: Results:      METS/Exercise Tolerance:     Hematologic: Comments: Thrombocytopenia    (+)      anemia,          Musculoskeletal:       GI/Hepatic:     (+) GERD,                    Renal/Genitourinary:     (+) renal disease, type: CRI,            Endo:       Psychiatric/Substance Use:       Infectious Disease:       Malignancy:       Other:               OUTSIDE LABS:  CBC:   Lab Results   Component Value Date    WBC 4.9 04/13/2024    WBC 7.1 03/21/2024    HGB 10.8 (L) 04/13/2024    HGB 9.6 (L) 04/11/2024    HCT 35.0 04/13/2024    HCT 28.5 (L) 03/21/2024    PLT 77 (L) 04/13/2024     (L) 03/21/2024     BMP:   Lab Results   Component Value Date     04/13/2024     (H) 04/11/2024    POTASSIUM 3.2 (L) 04/13/2024    POTASSIUM 3.2 (L) 04/11/2024    CHLORIDE 105 04/13/2024    CHLORIDE 106 04/11/2024    CO2 24 04/13/2024    CO2 26 04/11/2024    BUN 30.2 (H) 04/13/2024    BUN 26.6 (H) 04/11/2024    CR 1.81 (H) 04/13/2024    CR 1.75 (H) 04/11/2024    GLC 94 04/13/2024    GLC 84 04/11/2024     COAGS:   Lab Results   Component Value Date    PTT 37 07/20/2023    INR 1.22 (H) 11/06/2023    FIBR 388 07/20/2023     POC:   Lab Results   Component Value Date    BGM 86 01/31/2006     HEPATIC:   Lab Results   Component Value Date    ALBUMIN 4.5 04/13/2024    PROTTOTAL 7.8 04/13/2024    ALT 13 04/13/2024    AST 29 04/13/2024    ALKPHOS 84 04/13/2024    BILITOTAL 0.4 04/13/2024     OTHER:   Lab Results   Component Value Date    LACT 1.1 04/13/2024    ALEXANDRO 9.5 04/13/2024    PHOS 2.6 04/11/2024    MAG 1.9 04/13/2024    LIPASE 69 (H) 04/13/2024    TSH 3.28 09/29/2021    CRP 3.5 10/01/2021    SED 37 (H) 07/31/2013       Anesthesia Plan    ASA Status:  3, emergent    NPO Status:  ELEVATED Aspiration Risk/Unknown    Anesthesia Type: General.     - Airway: ETT   Induction: Intravenous, RSI, Propofol.   Maintenance: Balanced.   Techniques and Equipment:     - Lines/Monitors: 2nd IV, BIS     Consents    Anesthesia Plan(s) and associated risks, benefits, and realistic alternatives discussed. Questions answered and patient/representative(s) expressed understanding.     - Discussed: Risks, Benefits and  Alternatives for BOTH SEDATION and the PROCEDURE were discussed     - Discussed with:  Spouse      - Extended Intubation/Ventilatory Support Discussed: Yes.      - Patient is DNR/DNI Status: No     Use of blood products discussed: Yes.     - Discussed with: Patient.     - Consented: consented to blood products     Postoperative Care    Pain management: IV analgesics, Oral pain medications, Multi-modal analgesia.   PONV prophylaxis: Ondansetron (or other 5HT-3), Dexamethasone or Solumedrol     Comments:               Elmer Denson MD    I have reviewed the pertinent notes and labs in the chart from the past 30 days and (re)examined the patient.  Any updates or changes from those notes are reflected in this note.    # Hypokalemia: Lowest K = 3.2 mmol/L in last 2 days, will replace as needed  # Hypernatremia: Highest Na = 146 mmol/L in last 2 days, will monitor as appropriate   # Hypocalcemia: Lowest Ca = 8.3 mg/dL in last 30 days, will monitor and replace as appropriate    # Hypoalbuminemia: Lowest albumin = 3.4 g/dL in the past 30 days , will monitor as appropriate   # Drug Induced Coagulation Defect: home medication list includes an anticoagulant medication  # Drug Induced Platelet Defect: home medication list includes an antiplatelet medication

## 2024-04-13 NOTE — PROGRESS NOTES
"  Emergency General Surgery Progress Note  Surgery Cross-Cover  Post Op Check    04/13/2024    Tessa Mansfield is a 89 year old female POD#0 s/p Procedure(s):  Laparotomy exploratory, Wound Vac Placement. for Pre-Op Diagnosis Codes:     * Small bowel obstruction (H) [K56.609]    Pt reports their pain is controlled with current regimen. Denies nausea, SOB, chest pain, or dizziness. Is voiding via urinary catheter.     BP (!) 139/109   Pulse 62   Temp (!) 95.7  F (35.4  C) (Esophageal)   Resp 12   Ht 1.676 m (5' 6\")   Wt 69.4 kg (153 lb)   SpO2 100%   BMI 24.69 kg/m      Gen: Sedated and intubated  Chest: breathing non-labored on mechanical ventilation. Symmetrical chest rise.   Abdomen: soft, non-tender, non-distended. Abthera in   Incision: temporary closure with abthera.   Extremities: warm and well perfused. Pedal and radial pulses intact.  Devices:NG, Bonner, Left arterial line, 2 x Left PIV and 1 x Right PIV, ET Tube, abthera temporary closure devices    A/P: No acute post-op issues. Patient is tolerating the vent at current settings; FiO2 40, respiratory rate at 18. Platelets administered for a platelet count of 31. CBC to be drawn after infusion. Patient is off pressors. Sedation and pain with fentanyl and propofol drip.  LR  at 75mL/hr. Patient of ceftriaxone and metronidazole. High ICU electrolyte replacement. Ppx Protonix and SCDs. Continue plan of care per primary team. Please call with any questions.    Sarkis Jennings MD    "

## 2024-04-13 NOTE — BRIEF OP NOTE
[facility]    Brief Operative Note    Pre-operative diagnosis: Bowel obstruction (H) [K56.609]  Post-operative diagnosis Same as pre-operative diagnosis    Procedure: Exploratory Laparotomy, N/A - Abdomen    Surgeon: Surgeons and Role:     * Anthony Trammell MD - Primary     * Xiang Masters MD - Resident - Assisting  Anesthesia: General   Estimated Blood Loss: 100    Drains: None  Specimens: * No specimens in log *  Findings:   Hemorrhagic bowel, perfused on ICG .  Complications: None.  Implants: * No implants in log *

## 2024-04-13 NOTE — OR NURSING
Par Doc, Dr. Marion at bedside to assess patient. Pressures sustaining MAP <70. Verbal order to give 1L LR.

## 2024-04-13 NOTE — OR NURSING
Dr. Nichols at bedside to assess patient persistent hypotension, verbal order to give 250mL albumin.

## 2024-04-13 NOTE — ANESTHESIA PREPROCEDURE EVALUATION
Anesthesia Pre-Procedure Evaluation    Patient: Tessa Mansfield   MRN: [id number] : 1935        Procedure : Procedure(s):  Laparotomy exploratory, Wound Vac Placement.          Past Medical History:   Diagnosis Date    CAD (coronary artery disease)     CAD s/p PCI+BMS to MRCA 10/2002, PCI to mLCX 2003, PCI+DESx2 to pLAD 2007, PCI+DESx1 to dRCA 2007, PCI+ALVAREZ to RPDA 2013; on indefinite DAPT  10/27/2002    10/27/2002: AMI s/p PCI+BMS (3.5x18mm Bx velocity) to mRCA 2003: Back pain; PCI+stent to mLCX c/b distal embolization/slow flow 2003: Unstable angina; PCI+stent (Hepacoat velocity) to mLAD 2007: PCI+DESx2 to pLAD (IVUS w/ calcification of LMCA and ulcerated plaque pLAD, 80% dRCA; also had 80% dLCX, too small to stent) 2007: Staged PCI. PCI+DESx1 (3.5x13mm Cypher) to dRCA (indefinite DAPT recommended at this time) 2008: Angina. mLCX stent 70% ISR. LAD and RCA stents patent. Myocardium at risk from LCX felt to be small, medical management preferred to PCI. 11/10/2009: Angina. Findings unchanged from 2008, FFR LAD 0.90. Medical management recommended. 2013: Unstable angina; PCI+ALVAREZ to mPDA. Diagnostic findings: LMCA 40% distal. LAD: pLAD stents patent mLAD 30% ISR dLAD 50% diffuse, D2 diffuse disease. LCX 80% mid ISR. RCA diffuse <30% mid, RPLAs diffuse disease, RPDA 100% occlusion.      Cardiac Pacemaker- Medtronic, dual chamber- NOT dependant 2007    CKD (chronic kidney disease), stage IV (H)     Hyperlipidemia LDL goal <70     Hypertension     Stented coronary artery 10-    RCA    Stented coronary artery 2003    LCx    Stented coronary artery 2003    LAD    Stented coronary artery 2007    LAD    Stented coronary artery 2007    RCA    Transient complete heart block (H) 2007      Past Surgical History:   Procedure Laterality Date    CHOLECYSTECTOMY      CV CORONARY ANGIOGRAM N/A 2022    Procedure: Coronary Angiogram;   "Surgeon: Constantine Macias MD;  Location:  HEART CARDIAC CATH LAB    CV CORONARY ANGIOGRAM N/A 7/17/2023    Procedure: Coronary Angiogram;  Surgeon: Constantine Macias MD;  Location: [facility] CARDIAC CATH LAB    CV PCI N/A 6/17/2022    Procedure: Percutaneous Coronary Intervention;  Surgeon: Constantine Macias MD;  Location: [facility] CARDIAC CATH LAB    CV PCI N/A 7/17/2023    Procedure: Percutaneous Coronary Intervention;  Surgeon: Constantine Macias MD;  Location: [facility] CARDIAC CATH LAB    CV PCI N/A 11/6/2023    Procedure: Percutaneous Coronary Intervention;  Surgeon: Constantine Macias MD;  Location: [facility] CARDIAC CATH LAB    CV RIGHT HEART CATH MEASUREMENTS RECORDED N/A 6/17/2022    Procedure: Right Heart Catheterization;  Surgeon: Constantine Macias MD;  Location:  HEART CARDIAC CATH LAB    EP PACEMAKER N/A 10/15/2019    Procedure: EP PACEMAKER;  Surgeon: Anthony Zhu MD;  Location: [facility] CARDIAC CATH LAB    ESOPHAGOSCOPY, GASTROSCOPY, DUODENOSCOPY (EGD), COMBINED N/A 7/20/2023    Procedure: Esophagoscopy, gastroscopy, duodenoscopy (EGD), combined;  Surgeon: Bekah Dash DO;  Location:  GI    HC PPM INSERTION NEW/REPLACEMENT W/ ATRIAL&VENTRICULAR LEAD  11-    HYSTERECTOMY        Allergies   Allergen Reactions    Ciprofloxacin Rash    Codeine Sulfate Itching    Shrimp Swelling    Bactrim [Sulfamethoxazole W/Trimethoprim]      Patient unable to recall    Biaxin [Clarithromycin]     Chlorthalidone Nausea and Vomiting    Clonidine     Darvon [Propoxyphene Hcl]     Dilaudid [Hydromorphone] Visual Disturbance and Hallucination    Gabapentin Fatigue and Confusion     \"felt drunk\"    Indomethacin     Levaquin [Levofloxacin Hemihydrate]     Morphine Sulfate     Percocet [Oxycodone-Acetaminophen] Hallucination    Pregabalin Itching and Fatigue    Simvastatin Palpitations     Muscle weakness, leg cramping      Spironolactone      Dehydrated      Terazosin     "   Social History     Tobacco Use    Smoking status: Former     Current packs/day: 0.30     Average packs/day: 0.3 packs/day for 15.0 years (4.5 ttl pk-yrs)     Types: Cigarettes    Smokeless tobacco: Never    Tobacco comments:     Quit 35+ years ago   Substance Use Topics    Alcohol use: Not on file      Wt Readings from Last 1 Encounters:   04/13/24 69.4 kg (153 lb)              OUTSIDE LABS:  CBC:   Lab Results   Component Value Date    WBC 13.8 (H) 04/13/2024    WBC 4.9 04/13/2024    HGB 3.8 (LL) 04/13/2024    HGB 5.2 (LL) 04/13/2024    HCT 17.5 (L) 04/13/2024    HCT 35.0 04/13/2024    PLT 61 (L) 04/13/2024    PLT 77 (L) 04/13/2024     BMP:   Lab Results   Component Value Date     04/13/2024     04/13/2024    POTASSIUM 4.0 04/13/2024    POTASSIUM 3.7 04/13/2024    CHLORIDE 109 (H) 04/13/2024    CHLORIDE 105 04/13/2024    CO2 19 (L) 04/13/2024    CO2 24 04/13/2024    BUN 26.0 (H) 04/13/2024    BUN 30.2 (H) 04/13/2024    CR 1.73 (H) 04/13/2024    CR 1.81 (H) 04/13/2024     (H) 04/13/2024     (H) 04/13/2024     COAGS:   Lab Results   Component Value Date    PTT 37 07/20/2023    INR 1.22 (H) 11/06/2023    FIBR 388 07/20/2023     POC:   Lab Results   Component Value Date    BGM 86 01/31/2006     HEPATIC:   Lab Results   Component Value Date    ALBUMIN 4.5 04/13/2024    PROTTOTAL 7.8 04/13/2024    ALT 13 04/13/2024    AST 29 04/13/2024    ALKPHOS 84 04/13/2024    BILITOTAL 0.4 04/13/2024     OTHER:   Lab Results   Component Value Date    PH 7.31 (L) 04/13/2024    LACT 7.4 (HH) 04/13/2024    ALEXANDRO 7.7 (L) 04/13/2024    PHOS 2.6 04/11/2024    MAG 1.9 04/13/2024    LIPASE 69 (H) 04/13/2024    TSH 3.28 09/29/2021    CRP 3.5 10/01/2021    SED 37 (H) 07/31/2013       Anesthesia Plan    ASA Status:  5, emergent       Anesthesia Type: General.              Consents    Anesthesia Plan(s) and associated risks, benefits, and realistic alternatives discussed. Questions answered and patient/representative(s)  expressed understanding.     - Discussed:     - Discussed with:  Implied consent/emergency            Postoperative Care            Comments:    Other Comments: Emergent to OR, intraabdominal hemorrhage.             Donell Nichols MD    I have reviewed the pertinent notes and labs in the chart from the past 30 days and (re)examined the patient.  Any updates or changes from those notes are reflected in this note.    # Hypokalemia: Lowest K = 3.2 mmol/L in last 2 days, will replace as needed  # Hypernatremia: Highest Na = 146 mmol/L in last 2 days, will monitor as appropriate   # Hypocalcemia: Lowest Ca = 7.7 mg/dL in last 2 days, will monitor and replace as appropriate    # Hypoalbuminemia: Lowest albumin = 3.4 g/dL in the past 30 days , will monitor as appropriate   # Drug Induced Coagulation Defect: home medication list includes an anticoagulant medication  # Drug Induced Platelet Defect: home medication list includes an antiplatelet medication

## 2024-04-13 NOTE — OR NURSING
Dr. Marion and Dr. Nichols updated on critical lab value, hemoglobin 5.2. Order for type and screen and blood provided.

## 2024-04-13 NOTE — OP NOTE
[facility]    Brief Operative Note    Pre-operative diagnosis: Bowel obstruction (H) [K56.609]   Post-operative diagnosis * No post-op diagnosis entered *   Procedure: Procedure(s):  Exploratory Laparotomy   Surgeon: Surgeons and Role:     * Anthony Trammell MD - Primary     * Xiagn Masters MD - Resident - Assisting   Anesthesia: General    Estimated blood loss: 100   Drains: None   Specimens: * No specimens in log *   Findings: Hemorrhagic bowel without necrosis; small bowel torsion around adhesions between small bowel and transverse colon epiploae .     Complications: None.   Implants: None.           INDICATIONS FOR PROCEDURE  Tessa Mansfield is a 89 year old female who has previously undergone cholecystectomy and hysterectomy. She presented with abdominal pain and clinical signs of bowel obstruction with possible ischemia.     After understanding the risks and benefits of proceeding with an exploratory laparotomy, she agreed to an operation.    General risks related to abdominal surgery were reviewed with the patient.    These include, but are not limited to, death, myocardial infarction, pneumonia, urinary tract infection, deep venous thrombosis with or without pulmonary embolus, abdominal infection from bowel injury or abscess, bowel obstruction, wound infection, and bleeding.    She had taken a dose of apixaban last night and was given PCC in the emergency room pre op.     OPERATIVE PROCEDURE:  Under the benefits of general endotracheal anesthesia, the peritoneal cavity was entered through an upper midline incision.      A loop of small bowel was immediately seen to be hemorrhagic. There were multiple areas of torsion of the bowel around several small adhesions between small bowel wall and transverse colon epiploic fat. These adehsions were taken down sharply and were well vascularized, hemostasis was achieved with silk sutures. A 2 mm serosal tear  was oversewn with a silk suture. The bowel was run and ligament of Treitz and terminal ileum were visualized and there were no additional adhesions. A 10 cm segment of ileum was very hemorrhagic and we did not notice any peristalsis; there were several small thrombosed vessels in the mesentery. We attempted to doppler vessels perfusing this area but were unable to find a signal. We then gave the patient ICG and visualized the entire small bowel with spy and saw that the entire small bowel was well perfused. The bowel's appearance improved significantly during this process as well.     The bowel was replaced in the abdomen. Peritoneal lavage was performed using 1 L normal saline.    The abdomen was inspected for hemostasis.      The fascia was closed using 0 PDS. The skin was closed with skin staples.    Sterile dressings were applied.  The patient tolerated the procedure well.       Dr. Trammell was scrubbed for all critical components of the operation.      Xiang Masters MD, PhD, PGY-5  General Surgery

## 2024-04-13 NOTE — ED TRIAGE NOTES
"BP (!) 190/69   Pulse 62   Temp 97.8  F (36.6  C) (Oral)   Resp 18   Ht 1.676 m (5' 6\")   Wt 69.4 kg (153 lb)   SpO2 99%   BMI 24.69 kg/m      BIBA from home complaining of generalized stomach pain rated 12/10. Took tylenol last night at 2100 with no relief. Denies N/V, headache.      Triage Assessment (Adult)       Row Name 04/13/24 0245          Triage Assessment    Airway WDL WDL        Respiratory WDL    Respiratory WDL WDL        Skin Circulation/Temperature WDL    Skin Circulation/Temperature WDL WDL        Cardiac WDL    Cardiac WDL WDL        Peripheral/Neurovascular WDL    Peripheral Neurovascular WDL WDL        Cognitive/Neuro/Behavioral WDL    Cognitive/Neuro/Behavioral WDL WDL        [place] Coma Scale    Best Eye Response 4-->(E4) spontaneous     Best Motor Response 6-->(M6) obeys commands     Best Verbal Response 5-->(V5) oriented     [place] Coma Scale Score 15     Assessment Qualifiers patient not sedated/intubated                     "

## 2024-04-13 NOTE — OP NOTE
[facility]    Operative Note      Pre-operative diagnosis: Bleeding [R58]   Post-operative diagnosis *same   Procedure: Procedure(s):  Laparotomy exploratory   Surgeon: Surgeons and Role:     * Anthony Trammell MD - Primary     * Ashu Robledo MD - Resident - Assisting     * Xiang Masters MD - Resident - Assisting   Anesthesia: General    Estimated blood loss: Roughly 1.5Liters blood in abdomen upon entry.  50ml additional bleeding during the case.  Abthera applied.  Quick clot sponge x1 left in the abdomen   Drains: None   Specimens: * No specimens in log *   Findings: Slow ooze from suture along inferior aspect of the transverse mesocolon (site of prior lysis of adhesion).  Controlled with additional suture, transfusion of platelet and pressure .      Complications: None.   Implants: None.         OPERATIVE INDICATIONS:      Tessa Mansfield had been recovering from her earlier exploratory laparotomy for small bowel volvulus.  Known to be on DOAC and plavix (had received Kcentra x1 prior to surgery).  When I saw her in PACU she had a MAP in the upper 60s while still recovering from anesthesia.    We were called for lower blood pressures and a resultant HGB of 5.  I spoke with her  and we emergently brought her back to the OR.    She was placed supine on the OR table, sedated and intubated.  I asked for Platelets to be given.  Blood was ordered.  She was on phenylephrine infusion.    Her abdomen was prepped in sterile fashion.  A time out was quickly done.  I removed her staples. Small hematoma was found in the subcutaneous tissue.  Her fascial closure was opened.  Sanguinous ascites was found.  This was suctioned out.    We lifted the colon and found a hematoma along the inferior aspect of the transverse mesocolon, this was evacuated.  We evacuated all blood from the abdomen. No bleeding was appreciated from the LUQ or RUQ, no liver  laceration or splenic injury appreciated.    No blood was found pooling in the pelvis.  We ran the small bowel for its entire length and evaluated it and the mesentery. Adherent clot was found in several placed, but no active bleeding.  The prior area of the proximal ileum appeared bruised.      Along the midpoint of the inferior aspect of the transverse mesocolon- along our prior lysis of adhesions, we saw a small ooze of blood along our previously placed suture.  Pressure was held and then additional sutures were placed to control it.  This area corresponded to where we found the initial hematoma.  I suspect her medication induced coagulopathy contributed to the development of this post operative bleeding and it responded to pressure, additional suture placement and platelet transfusion.    I placed a single sheet of quick clot gauze along this site.  Given her earlier concerns for bowel ischemia, her now profound anemia and risk of additional episodes of hypotension- I placed an Abthera device.      She was left intubated and then brought to the SICU.    I was present throughout the procedure.

## 2024-04-13 NOTE — PROGRESS NOTES
Tessa Mansfield is a 89 year old female patient.  1. Small bowel obstruction (H)      Past Medical History:   Diagnosis Date    CAD (coronary artery disease)     CAD s/p PCI+BMS to MRCA 10/2002, PCI to mLCX 2/2003, PCI+DESx2 to pLAD 11/2007, PCI+DESx1 to dRCA 12/2007, PCI+ALVAREZ to RPDA 7/2013; on indefinite DAPT  10/27/2002    10/27/2002: AMI s/p PCI+BMS (3.5x18mm Bx velocity) to mRCA 2/5/2003: Back pain; PCI+stent to mLCX c/b distal embolization/slow flow 4/11/2003: Unstable angina; PCI+stent (Hepacoat velocity) to mLAD 11/27/2007: PCI+DESx2 to pLAD (IVUS w/ calcification of LMCA and ulcerated plaque pLAD, 80% dRCA; also had 80% dLCX, too small to stent) 12/11/2007: Staged PCI. PCI+DESx1 (3.5x13mm Cypher) to dRCA (indefinite DAPT recommended at this time) 6/27/2008: Angina. mLCX stent 70% ISR. LAD and RCA stents patent. Myocardium at risk from LCX felt to be small, medical management preferred to PCI. 11/10/2009: Angina. Findings unchanged from 6/27/2008, FFR LAD 0.90. Medical management recommended. 7/23/2013: Unstable angina; PCI+ALVAREZ to mPDA. Diagnostic findings: LMCA 40% distal. LAD: pLAD stents patent mLAD 30% ISR dLAD 50% diffuse, D2 diffuse disease. LCX 80% mid ISR. RCA diffuse <30% mid, RPLAs diffuse disease, RPDA 100% occlusion.      Cardiac Pacemaker- Medtronic, dual chamber- NOT dependant 11/28/2007    CKD (chronic kidney disease), stage IV (H)     Hyperlipidemia LDL goal <70     Hypertension     Stented coronary artery 10-    RCA    Stented coronary artery 2-5-2003    LCx    Stented coronary artery 4-    LAD    Stented coronary artery 11-    LAD    Stented coronary artery 12-    RCA    Transient complete heart block (H) 11/28/2007     No current outpatient medications on file.     Allergies   Allergen Reactions    Ciprofloxacin Rash    Codeine Sulfate Itching    Shrimp Swelling    Bactrim [Sulfamethoxazole W/Trimethoprim]      Patient unable to recall    Biaxin [Clarithromycin]   "   Chlorthalidone Nausea and Vomiting    Clonidine     Darvon [Propoxyphene Hcl]     Dilaudid [Hydromorphone] Visual Disturbance and Hallucination    Gabapentin Fatigue and Confusion     \"felt drunk\"    Indomethacin     Levaquin [Levofloxacin Hemihydrate]     Morphine Sulfate     Percocet [Oxycodone-Acetaminophen] Hallucination    Pregabalin Itching and Fatigue    Simvastatin Palpitations     Muscle weakness, leg cramping      Spironolactone      Dehydrated      Terazosin      Active Problems:    Small bowel obstruction (H)    Blood pressure 94/45, pulse 66, temperature 97.7  F (36.5  C), temperature source Axillary, resp. rate 23, height 1.676 m (5' 6\"), weight 69.4 kg (153 lb), SpO2 100%, not currently breastfeeding.    Subjective  Objective  Assessment & Plan    Sol Billy, CAROL  4/13/2024      This RN spoke with Dr. Nichols, Anesthesiologist. Massive Transfusion Protocol was not ordered/triggered. Patient received 3 units PRBCs, labs will be obtained and plan of care will be changed as appropriate.      "

## 2024-04-13 NOTE — H&P
SURGICAL ICU ADMISSION NOTE  04/13/2024      PRIMARY TEAM: [facility]  PRIMARY PHYSICIAN: Dr. Trammell in [facility]  REASON FOR CRITICAL CARE ADMISSION: Post-operative management   ADMITTING PHYSICIAN: Feliberto Carr  Date of Service (when I saw the patient): 04/13/2024    ASSESSMENT:  Tessa Mansfield is a 89 year old female with a history of cholecystectomy and hysterectomy, CAD s/p multiple stents, CKD, Afib with Apixaban, reversed in the OR with PCC, who was admitted on 4/13/2024 with abdominal pain and clinical signs of bowel obstruction with possible ischemia. She is:    S/p exploratory lap, found with hemorrhagic bowel without necrosis, small bowel torsion around adhesions between small bowel and transverse colon epiploae  S/p Emergent take back due to hypotension and acute anemia     She was brought to the ICU with an open abdomen with a plan to RTOR on 4/14.    PLAN:    Neurological:  # Acute post-operative pain  - Monitor neurological status.  - Keep appropriately sedated for synchronicity and pain control  - Pain: Fentanyl gtt    - Sedation plan: Propofol gtt    Pulmonary:   # Post operative ventilatory support  # Acute hypoxic respiratory support   - VENT:   RR = 15, FiO2 60  Sating 100%  Continue full vent support. PST when meets criteria.  Ventilatory bundle. HOB elevation   - Supplemental oxygen to keep saturation above 92 %.  - Holding Budesonide PTA    Cardiovascular:    # History of Atrial Fibrillation on Apixaban  # History of CAD, sp multiple stents  # Shock-distributive   - EKG baseline performed 4/13, demonstrating Atrial-paced with PVCs  - Last echo (10/9/2023), demonstrating EF of 55-60% with mild-moderate mitral insufficiency, with RVP 35 mmHg above RAP  - Monitor hemodynamic status  - Continue pressors as needed, wean as able.   - Holding PTA Amlodipine  - Holding PTA Apixaban  - Holding PTA Plavix  - Holding PTA metoprolol 50 BID  - Holding PTA Lasix 40 daily  - Restart PTA rosuvastatin      Gastroenterology/Nutrition:  # s/p exploratory laparotomy x2   # Protein calorie deficit malnutrition   - Has NG tube  - Wound vac in place, record outputs  - Plan for RTOR tomorrow per primary team    Renal/Fluids/Electrolytes:   # History of CKD (baseline creatinine 1.7)  - LR 75/hr for IV fluid hydration and blood products  - Urine output is low, continue to monitor closely . Will continue to monitor intake and output.  - Holding PTA allopurinol  - ICU Electrolyte replacement protocol (Mag, Phos, K)  - Will send STAT CMP post-op to assess for SHAJI on CKD    Endocrine:  # At risk for stress hyperglycemia   - No management indication.   - Will consider Sliding scale for glucose management. Goal to keep BG< 180 for optimal wound healing     ID:  # Concern for bowel necrosis  # Leukocytosis   - Will continue Ceftriaxone/Flagyl  - Trend WC and fevers  - Fever work-up if indicated    Heme:     # Acute blood loss anemia   # Anemia of critical illness  - Transfuse if hgb <7.0 or signs/symptoms of hypoperfusion. Monitor and trend.   - Will send coags, CBC and CMP once in unit, trending and transfusing as indicated  - Obtain STAT TEG, transfuse accordingly  - Received 3 U PRBCs and 1 U Platelets in OR.     Musculoskeletal:  # Weakness and deconditioning of critical illness   - Physical and occupational therapy consult     Skin:  # At risk for pressure ulcers   - Appreciate excellent nursing cares  - Frequent turning as indicated    General Cares/Prophylaxis:    DVT Prophylaxis: Pneumatic Compression Devices  GI Prophylaxis: PPI  Restraints: Restraints for medical healing needed: NO    Lines/ tubes/ drains:  - ETT  - 3 PIV  - Arterial Line  - Bonner  - Wound Vac    Disposition:  - Surgical ICU    Patient seen, findings and plan discussed with surgical ICU staff, Dr. Feliberto James MD  - - - - - - - - - - - - - - - - - - - - - - - - - - - - - - - - - - - - - - - - - - - - - -   HISTORY PRESENTING ILLNESS:    Tessa Mansfield is a 89 year old female with a history of cholecystectomy and hysterectomy, CAD s/p multiple stents, CKD, Afib with Apixaban, reversed in the OR with PCC, who was admitted on 4/13/2024 with abdominal pain and clinical signs of bowel obstruction with possible ischemia. After the first ex lap, the patient was hypotensive, and repeat labs demonstrated a sudden decline in Hgb necessitating immediate return to OR. Post-operatively, the patient was requiring phenylephrine to maintain MAPs > 65, and was requiring several units of blood due to persistently low Hgb. Brought to the ICU intubated, sedated with an open abdomen, with a plan to return to OR on 4/14.     REVIEW OF SYSTEMS: 10 point ROS neg other than the symptoms noted above in the HPI.    PAST MEDICAL HISTORY:   Past Medical History:   Diagnosis Date    CAD (coronary artery disease)     CAD s/p PCI+BMS to MRCA 10/2002, PCI to mLCX 2/2003, PCI+DESx2 to pLAD 11/2007, PCI+DESx1 to dRCA 12/2007, PCI+ALVAREZ to RPDA 7/2013; on indefinite DAPT  10/27/2002    10/27/2002: AMI s/p PCI+BMS (3.5x18mm Bx velocity) to mRCA 2/5/2003: Back pain; PCI+stent to mLCX c/b distal embolization/slow flow 4/11/2003: Unstable angina; PCI+stent (Hepacoat velocity) to mLAD 11/27/2007: PCI+DESx2 to pLAD (IVUS w/ calcification of LMCA and ulcerated plaque pLAD, 80% dRCA; also had 80% dLCX, too small to stent) 12/11/2007: Staged PCI. PCI+DESx1 (3.5x13mm Cypher) to dRCA (indefinite DAPT recommended at this time) 6/27/2008: Angina. mLCX stent 70% ISR. LAD and RCA stents patent. Myocardium at risk from LCX felt to be small, medical management preferred to PCI. 11/10/2009: Angina. Findings unchanged from 6/27/2008, FFR LAD 0.90. Medical management recommended. 7/23/2013: Unstable angina; PCI+ALVAREZ to mPDA. Diagnostic findings: LMCA 40% distal. LAD: pLAD stents patent mLAD 30% ISR dLAD 50% diffuse, D2 diffuse disease. LCX 80% mid ISR. RCA diffuse <30% mid, RPLAs diffuse disease, RPDA  100% occlusion.      Cardiac Pacemaker- Medtronic, dual chamber- NOT dependant 11/28/2007    CKD (chronic kidney disease), stage IV (H)     Hyperlipidemia LDL goal <70     Hypertension     Stented coronary artery 10-    RCA    Stented coronary artery 2-5-2003    LCx    Stented coronary artery 4-    LAD    Stented coronary artery 11-    LAD    Stented coronary artery 12-    RCA    Transient complete heart block (H) 11/28/2007       SURGICAL HISTORY:   Past Surgical History:   Procedure Laterality Date    CHOLECYSTECTOMY      CV CORONARY ANGIOGRAM N/A 6/17/2022    Procedure: Coronary Angiogram;  Surgeon: Constantine Macias MD;  Location: [facility] CARDIAC CATH LAB    CV CORONARY ANGIOGRAM N/A 7/17/2023    Procedure: Coronary Angiogram;  Surgeon: Constantine Macias MD;  Location: [facility] CARDIAC CATH LAB    CV PCI N/A 6/17/2022    Procedure: Percutaneous Coronary Intervention;  Surgeon: Constantine Macias MD;  Location: [facility] CARDIAC CATH LAB    CV PCI N/A 7/17/2023    Procedure: Percutaneous Coronary Intervention;  Surgeon: Constantine Macias MD;  Location: [facility] CARDIAC CATH LAB    CV PCI N/A 11/6/2023    Procedure: Percutaneous Coronary Intervention;  Surgeon: Constantine Macias MD;  Location: [facility] CARDIAC CATH LAB    CV RIGHT HEART CATH MEASUREMENTS RECORDED N/A 6/17/2022    Procedure: Right Heart Catheterization;  Surgeon: Constantine Macias MD;  Location: [facility] CARDIAC CATH LAB    EP PACEMAKER N/A 10/15/2019    Procedure: EP PACEMAKER;  Surgeon: Anthony Zhu MD;  Location: [facility] CARDIAC CATH LAB    ESOPHAGOSCOPY, GASTROSCOPY, DUODENOSCOPY (EGD), COMBINED N/A 7/20/2023    Procedure: Esophagoscopy, gastroscopy, duodenoscopy (EGD), combined;  Surgeon: Bekah Dash DO;  Location:  GI    HC PPM INSERTION NEW/REPLACEMENT W/ ATRIAL&VENTRICULAR LEAD  11-    HYSTERECTOMY         SOCIAL HISTORY:   Social History     Socioeconomic  History    Marital status:     Number of children: 4   Occupational History     Employer: [employer]   Tobacco Use    Smoking status: Former     Current packs/day: 0.30     Average packs/day: 0.3 packs/day for 15.0 years (4.5 ttl pk-yrs)     Types: Cigarettes    Smokeless tobacco: Never    Tobacco comments:     Quit 35+ years ago   Substance and Sexual Activity    Drug use: No    Sexual activity: Not Currently     Partners: Male     Birth control/protection: Post-menopausal   Other Topics Concern    Blood Transfusions Yes    Exercise No     Social Determinants of Health     Financial Resource Strain: Low Risk  (11/15/2023)    Financial Resource Strain     Within the past 12 months, have you or your family members you live with been unable to get utilities (heat, electricity) when it was really needed?: No   Food Insecurity: Low Risk  (11/15/2023)    Food Insecurity     Within the past 12 months, did you worry that your food would run out before you got money to buy more?: No     Within the past 12 months, did the food you bought just not last and you didn t have money to get more?: No   Transportation Needs: Low Risk  (11/15/2023)    Transportation Needs     Within the past 12 months, has lack of transportation kept you from medical appointments, getting your medicines, non-medical meetings or appointments, work, or from getting things that you need?: No   Interpersonal Safety: Low Risk  (11/15/2023)    Interpersonal Safety     Do you feel physically and emotionally safe where you currently live?: Yes     Within the past 12 months, have you been hit, slapped, kicked or otherwise physically hurt by someone?: No     Within the past 12 months, have you been humiliated or emotionally abused in other ways by your partner or ex-partner?: No   Housing Stability: Low Risk  (11/15/2023)    Housing Stability     Do you have housing? : Yes     Are you worried about losing your housing?: No     FAMILY  "HISTORY: No bleeding/clotting disorders nor problems with anesthesia.    ALLERGIES:   Allergies   Allergen Reactions    Ciprofloxacin Rash    Codeine Sulfate Itching    Shrimp Swelling    Bactrim [Sulfamethoxazole W/Trimethoprim]      Patient unable to recall    Biaxin [Clarithromycin]     Chlorthalidone Nausea and Vomiting    Clonidine     Darvon [Propoxyphene Hcl]     Dilaudid [Hydromorphone] Visual Disturbance and Hallucination    Gabapentin Fatigue and Confusion     \"felt drunk\"    Indomethacin     Levaquin [Levofloxacin Hemihydrate]     Morphine Sulfate     Percocet [Oxycodone-Acetaminophen] Hallucination    Pregabalin Itching and Fatigue    Simvastatin Palpitations     Muscle weakness, leg cramping      Spironolactone      Dehydrated      Terazosin        MEDICATIONS:  Current Facility-Administered Medications   Medication Dose Route Frequency Provider Last Rate Last Admin    [Auto Hold] cefTRIAXone (ROCEPHIN) 1 g vial to attach to  mL bag for ADULTS or NS 50 mL bag for PEDS  1 g Intravenous Once Kenny Tate MD        lactated ringers infusion   Intravenous Continuous Xiang Masters MD 75 mL/hr at 04/13/24 1104 New Bag at 04/13/24 1104    [Auto Hold] metroNIDAZOLE (FLAGYL) infusion 500 mg  500 mg Intravenous Q8H Kenny Tate MD        phenylephrine (AN-SYNEPHRINE) 50 mg in NaCl 0.9 % 250 mL infusion PERIPHERAL  0.5-1.25 mcg/kg/min Intravenous Continuous Sandro Marion MD 12.5 mL/hr at 04/13/24 1112 0.6 mcg/kg/min at 04/13/24 1112       PHYSICAL EXAMINATION:  Temp:  [97.7  F (36.5  C)-97.8  F (36.6  C)] 97.7  F (36.5  C)  Pulse:  [60-78] 63  Resp:  [10-24] 21  BP: ()/(43-69) 85/44  SpO2:  [93 %-100 %] 99 %  General: Intubated, sedated, resting in bed  HEENT:NG tube in place, ETT tube in place, otherwise atraumatic, normocephalic  Neuro: Sedated on propofol  Pulm/Resp: Breathing synchronously with the ventilator  CV: sinus rhythm on monitor  Abdomen: Abthera in place putting out " serosanguinous output  :  hameed catheter in place, urine yellow and clear, minimal output  Incisions/Skin: Frail, but intact  MSK/Extremities: Extremities appear well perfused    LABS: Reviewed.   Arterial Blood Gases   No lab results found in last 7 days.  Complete Blood Count   Recent Labs   Lab 04/13/24  1027 04/13/24 0317 04/11/24  1519   WBC 13.8* 4.9  --    HGB 5.2* 10.8* 9.6*   PLT 61* 77*  --      Basic Metabolic Panel  Recent Labs   Lab 04/13/24  1027 04/13/24  0317 04/11/24  1519    145 146*   POTASSIUM 3.7 3.2* 3.2*   CHLORIDE 109* 105 106   CO2 19* 24 26   BUN 26.0* 30.2* 26.6*   CR 1.73* 1.81* 1.75*   * 94 84     Liver Function Tests  Recent Labs   Lab 04/13/24 0317 04/11/24  1519   AST 29  --    ALT 13  --    ALKPHOS 84  --    BILITOTAL 0.4  --    ALBUMIN 4.5 4.3     Pancreatic Enzymes  Recent Labs   Lab 04/13/24  0317   LIPASE 69*     Coagulation Profile  No lab results found in last 7 days.    IMAGING:  Recent Results (from the past 24 hour(s))   CT Abdomen Pelvis w/o Contrast    Narrative    EXAM: CT ABDOMEN AND PELVIS WITHOUT CONTRAST  LOCATION: [facility]  DATE: 04/13/2024    INDICATION: Mid and left-sided abdominal pain.  COMPARISON: 03/18/2024.  TECHNIQUE: CT scan of the abdomen and pelvis was performed without IV contrast. Multiplanar reformats were obtained. Dose reduction techniques were used.  CONTRAST: None.    FINDINGS:   LOWER CHEST: Cardiac enlargement.    HEPATOBILIARY: Liver is negative. Gallbladder is absent. No biliary dilatation.    PANCREAS: Normal.    SPLEEN: Normal.    ADRENAL GLANDS: Normal.    KIDNEYS/BLADDER: Stable noncontrast appearance of the kidneys. A few small benign-appearing bilateral renal cysts are present. Cortical scarring and atrophy in the kidneys bilaterally, left greater than right, stable in appearance. Lobulated renal   contours bilaterally. No urinary calculi or hydronephrosis. Bladder is  unremarkable.    BOWEL: Multiple dilated fluid-filled loops of small bowel seen in the left side of the abdomen with decompression of the distal most small bowel. More proximal loops of small bowel are also relatively decompressed. Focal abrupt narrowing of two adjacent   loops of small bowel seen in the left upper quadrant best appreciated on series 3 image 32. There is some questionable wall thickening of the dilated loops of bowel as well as mesenteric edema present. This appearance could potentially be seen with   closed loop obstruction and cannot exclude developing ischemic change. No bowel wall pneumatosis or free air. Colonic diverticulosis without evidence for diverticulitis.    LYMPH NODES: Normal.    VASCULATURE: Aortic calcification without aneurysm. IVC filter in place. Small amount of free fluid in the left upper quadrant about the spleen.    PELVIC ORGANS: Largely obscured by streak artifact from bilateral hip arthroplasties. Uterus appears absent.    MUSCULOSKELETAL: Mild degenerative changes lumbar spine. Bilateral hip arthroplasties.      Impression    IMPRESSION:   1.  Evidence for mechanical small bowel obstruction as detailed above. Configuration raises concern for potential closed-loop obstruction. There appears be some mild bowel wall thickening as well as mesenteric edema present. Cannot exclude developing   ischemic change. No bowel wall pneumatosis or free air. Correlation with serum lactate levels and recommend surgical consultation for further management options.    2.  Findings discussed with Dr. Tate on 04/13/2024 at 3:59 AM CDT.

## 2024-04-14 ENCOUNTER — APPOINTMENT (OUTPATIENT)
Dept: GENERAL RADIOLOGY | Facility: CLINIC | Age: 89
DRG: 329 | End: 2024-04-14
Attending: SURGERY
Payer: COMMERCIAL

## 2024-04-14 ENCOUNTER — ANESTHESIA (OUTPATIENT)
Dept: SURGERY | Facility: CLINIC | Age: 89
DRG: 329 | End: 2024-04-14
Payer: COMMERCIAL

## 2024-04-14 LAB
ACANTHOCYTES BLD QL SMEAR: ABNORMAL
ANION GAP SERPL CALCULATED.3IONS-SCNC: 12 MMOL/L (ref 7–15)
ANION GAP SERPL CALCULATED.3IONS-SCNC: 14 MMOL/L (ref 7–15)
AUER BODIES BLD QL SMEAR: ABNORMAL
BASO STIPL BLD QL SMEAR: ABNORMAL
BITE CELLS BLD QL SMEAR: ABNORMAL
BLD PROD TYP BPU: NORMAL
BLISTER CELLS BLD QL SMEAR: ABNORMAL
BLOOD COMPONENT TYPE: NORMAL
BUN SERPL-MCNC: 24.6 MG/DL (ref 8–23)
BUN SERPL-MCNC: 27.3 MG/DL (ref 8–23)
BURR CELLS BLD QL SMEAR: ABNORMAL
CALCIUM SERPL-MCNC: 7.7 MG/DL (ref 8.8–10.2)
CALCIUM SERPL-MCNC: 8.1 MG/DL (ref 8.8–10.2)
CHLORIDE SERPL-SCNC: 107 MMOL/L (ref 98–107)
CHLORIDE SERPL-SCNC: 109 MMOL/L (ref 98–107)
CODING SYSTEM: NORMAL
CREAT SERPL-MCNC: 1.96 MG/DL (ref 0.51–0.95)
CREAT SERPL-MCNC: 1.99 MG/DL (ref 0.51–0.95)
CROSSMATCH: NORMAL
DACRYOCYTES BLD QL SMEAR: ABNORMAL
DEPRECATED HCO3 PLAS-SCNC: 21 MMOL/L (ref 22–29)
DEPRECATED HCO3 PLAS-SCNC: 22 MMOL/L (ref 22–29)
EGFRCR SERPLBLD CKD-EPI 2021: 24 ML/MIN/1.73M2
EGFRCR SERPLBLD CKD-EPI 2021: 24 ML/MIN/1.73M2
ELLIPTOCYTES BLD QL SMEAR: ABNORMAL
ERYTHROCYTE [DISTWIDTH] IN BLOOD BY AUTOMATED COUNT: 15.9 % (ref 10–15)
ERYTHROCYTE [DISTWIDTH] IN BLOOD BY AUTOMATED COUNT: 16.2 % (ref 10–15)
ERYTHROCYTE [DISTWIDTH] IN BLOOD BY AUTOMATED COUNT: 16.6 % (ref 10–15)
FRAGMENTS BLD QL SMEAR: ABNORMAL
GLUCOSE BLDC GLUCOMTR-MCNC: 110 MG/DL (ref 70–99)
GLUCOSE BLDC GLUCOMTR-MCNC: 110 MG/DL (ref 70–99)
GLUCOSE SERPL-MCNC: 118 MG/DL (ref 70–99)
GLUCOSE SERPL-MCNC: 120 MG/DL (ref 70–99)
HCT VFR BLD AUTO: 20.3 % (ref 35–47)
HCT VFR BLD AUTO: 23.2 % (ref 35–47)
HCT VFR BLD AUTO: 25.8 % (ref 35–47)
HGB BLD-MCNC: 6.8 G/DL (ref 11.7–15.7)
HGB BLD-MCNC: 7.9 G/DL (ref 11.7–15.7)
HGB BLD-MCNC: 8.8 G/DL (ref 11.7–15.7)
HGB C CRYSTALS: ABNORMAL
HOWELL-JOLLY BOD BLD QL SMEAR: ABNORMAL
INR PPP: 1.59 (ref 0.85–1.15)
ISSUE DATE AND TIME: NORMAL
LACTATE SERPL-SCNC: 1.7 MMOL/L (ref 0.7–2)
LACTATE SERPL-SCNC: 1.8 MMOL/L (ref 0.7–2)
MAGNESIUM SERPL-MCNC: 2.3 MG/DL (ref 1.7–2.3)
MCH RBC QN AUTO: 29.6 PG (ref 26.5–33)
MCH RBC QN AUTO: 29.7 PG (ref 26.5–33)
MCH RBC QN AUTO: 29.7 PG (ref 26.5–33)
MCHC RBC AUTO-ENTMCNC: 33.5 G/DL (ref 31.5–36.5)
MCHC RBC AUTO-ENTMCNC: 34.1 G/DL (ref 31.5–36.5)
MCHC RBC AUTO-ENTMCNC: 34.1 G/DL (ref 31.5–36.5)
MCV RBC AUTO: 87 FL (ref 78–100)
MCV RBC AUTO: 87 FL (ref 78–100)
MCV RBC AUTO: 89 FL (ref 78–100)
NEUTS HYPERSEG BLD QL SMEAR: ABNORMAL
PHOSPHATE SERPL-MCNC: 4.1 MG/DL (ref 2.5–4.5)
PLAT MORPH BLD: ABNORMAL
PLATELET # BLD AUTO: 38 10E3/UL (ref 150–450)
PLATELET # BLD AUTO: 43 10E3/UL (ref 150–450)
PLATELET # BLD AUTO: 44 10E3/UL (ref 150–450)
POLYCHROMASIA BLD QL SMEAR: ABNORMAL
POTASSIUM SERPL-SCNC: 3.5 MMOL/L (ref 3.4–5.3)
POTASSIUM SERPL-SCNC: 3.8 MMOL/L (ref 3.4–5.3)
RBC # BLD AUTO: 2.29 10E6/UL (ref 3.8–5.2)
RBC # BLD AUTO: 2.66 10E6/UL (ref 3.8–5.2)
RBC # BLD AUTO: 2.97 10E6/UL (ref 3.8–5.2)
RBC AGGLUT BLD QL: ABNORMAL
RBC MORPH BLD: ABNORMAL
ROULEAUX BLD QL SMEAR: ABNORMAL
SICKLE CELLS BLD QL SMEAR: ABNORMAL
SMUDGE CELLS BLD QL SMEAR: ABNORMAL
SODIUM SERPL-SCNC: 142 MMOL/L (ref 135–145)
SODIUM SERPL-SCNC: 143 MMOL/L (ref 135–145)
SPHEROCYTES BLD QL SMEAR: ABNORMAL
STOMATOCYTES BLD QL SMEAR: ABNORMAL
TARGETS BLD QL SMEAR: ABNORMAL
TOXIC GRANULES BLD QL SMEAR: ABNORMAL
UNIT ABO/RH: NORMAL
UNIT NUMBER: NORMAL
UNIT STATUS: NORMAL
UNIT TYPE ISBT: 600
UNIT TYPE ISBT: 6200
UNIT TYPE ISBT: 7300
UNIT TYPE ISBT: 9500
VARIANT LYMPHS BLD QL SMEAR: ABNORMAL
WBC # BLD AUTO: 24.9 10E3/UL (ref 4–11)
WBC # BLD AUTO: 27.6 10E3/UL (ref 4–11)
WBC # BLD AUTO: 31.5 10E3/UL (ref 4–11)

## 2024-04-14 PROCEDURE — 999N000253 HC STATISTIC WEANING TRIALS

## 2024-04-14 PROCEDURE — 250N000024 HC ISOFLURANE, PER MIN: Performed by: SURGERY

## 2024-04-14 PROCEDURE — 250N000009 HC RX 250: Performed by: STUDENT IN AN ORGANIZED HEALTH CARE EDUCATION/TRAINING PROGRAM

## 2024-04-14 PROCEDURE — 360N000076 HC SURGERY LEVEL 3, PER MIN: Performed by: SURGERY

## 2024-04-14 PROCEDURE — 250N000011 HC RX IP 250 OP 636: Mod: JZ | Performed by: STUDENT IN AN ORGANIZED HEALTH CARE EDUCATION/TRAINING PROGRAM

## 2024-04-14 PROCEDURE — C9113 INJ PANTOPRAZOLE SODIUM, VIA: HCPCS | Performed by: STUDENT IN AN ORGANIZED HEALTH CARE EDUCATION/TRAINING PROGRAM

## 2024-04-14 PROCEDURE — 250N000011 HC RX IP 250 OP 636

## 2024-04-14 PROCEDURE — 999N000259 HC STATISTIC EXTUBATION

## 2024-04-14 PROCEDURE — 99291 CRITICAL CARE FIRST HOUR: CPT | Mod: 24 | Performed by: ANESTHESIOLOGY

## 2024-04-14 PROCEDURE — 250N000009 HC RX 250: Performed by: NURSE ANESTHETIST, CERTIFIED REGISTERED

## 2024-04-14 PROCEDURE — 258N000003 HC RX IP 258 OP 636: Performed by: STUDENT IN AN ORGANIZED HEALTH CARE EDUCATION/TRAINING PROGRAM

## 2024-04-14 PROCEDURE — 250N000011 HC RX IP 250 OP 636: Performed by: NURSE ANESTHETIST, CERTIFIED REGISTERED

## 2024-04-14 PROCEDURE — 250N000011 HC RX IP 250 OP 636: Performed by: SURGERY

## 2024-04-14 PROCEDURE — 94003 VENT MGMT INPAT SUBQ DAY: CPT

## 2024-04-14 PROCEDURE — 0DC80ZZ EXTIRPATION OF MATTER FROM SMALL INTESTINE, OPEN APPROACH: ICD-10-PCS | Performed by: SURGERY

## 2024-04-14 PROCEDURE — 999N000157 HC STATISTIC RCP TIME EA 10 MIN

## 2024-04-14 PROCEDURE — 85027 COMPLETE CBC AUTOMATED: CPT | Performed by: STUDENT IN AN ORGANIZED HEALTH CARE EDUCATION/TRAINING PROGRAM

## 2024-04-14 PROCEDURE — 0DQA0ZZ REPAIR JEJUNUM, OPEN APPROACH: ICD-10-PCS | Performed by: SURGERY

## 2024-04-14 PROCEDURE — 49002 REOPENING OF ABDOMEN: CPT | Mod: 78 | Performed by: SURGERY

## 2024-04-14 PROCEDURE — 250N000013 HC RX MED GY IP 250 OP 250 PS 637: Performed by: STUDENT IN AN ORGANIZED HEALTH CARE EDUCATION/TRAINING PROGRAM

## 2024-04-14 PROCEDURE — 0HQ7XZZ REPAIR ABDOMEN SKIN, EXTERNAL APPROACH: ICD-10-PCS | Performed by: SURGERY

## 2024-04-14 PROCEDURE — P9037 PLATE PHERES LEUKOREDU IRRAD: HCPCS

## 2024-04-14 PROCEDURE — 80048 BASIC METABOLIC PNL TOTAL CA: CPT | Performed by: STUDENT IN AN ORGANIZED HEALTH CARE EDUCATION/TRAINING PROGRAM

## 2024-04-14 PROCEDURE — 258N000003 HC RX IP 258 OP 636: Performed by: NURSE ANESTHETIST, CERTIFIED REGISTERED

## 2024-04-14 PROCEDURE — 84100 ASSAY OF PHOSPHORUS: CPT | Performed by: STUDENT IN AN ORGANIZED HEALTH CARE EDUCATION/TRAINING PROGRAM

## 2024-04-14 PROCEDURE — 83605 ASSAY OF LACTIC ACID: CPT | Performed by: STUDENT IN AN ORGANIZED HEALTH CARE EDUCATION/TRAINING PROGRAM

## 2024-04-14 PROCEDURE — 250N000011 HC RX IP 250 OP 636: Mod: JZ | Performed by: SURGERY

## 2024-04-14 PROCEDURE — 370N000017 HC ANESTHESIA TECHNICAL FEE, PER MIN: Performed by: SURGERY

## 2024-04-14 PROCEDURE — 272N000001 HC OR GENERAL SUPPLY STERILE: Performed by: SURGERY

## 2024-04-14 PROCEDURE — 999N000063 XR ABDOMEN PORT 1 VIEW

## 2024-04-14 PROCEDURE — 200N000002 HC R&B ICU UMMC

## 2024-04-14 PROCEDURE — 74018 RADEX ABDOMEN 1 VIEW: CPT | Mod: 26 | Performed by: RADIOLOGY

## 2024-04-14 PROCEDURE — 83735 ASSAY OF MAGNESIUM: CPT | Performed by: STUDENT IN AN ORGANIZED HEALTH CARE EDUCATION/TRAINING PROGRAM

## 2024-04-14 PROCEDURE — 85610 PROTHROMBIN TIME: CPT | Performed by: STUDENT IN AN ORGANIZED HEALTH CARE EDUCATION/TRAINING PROGRAM

## 2024-04-14 PROCEDURE — 250N000011 HC RX IP 250 OP 636: Performed by: STUDENT IN AN ORGANIZED HEALTH CARE EDUCATION/TRAINING PROGRAM

## 2024-04-14 RX ORDER — HYDRALAZINE HYDROCHLORIDE 20 MG/ML
10 INJECTION INTRAMUSCULAR; INTRAVENOUS EVERY 4 HOURS PRN
Status: DISCONTINUED | OUTPATIENT
Start: 2024-04-14 | End: 2024-04-22 | Stop reason: HOSPADM

## 2024-04-14 RX ORDER — POTASSIUM CHLORIDE 7.45 MG/ML
10 INJECTION INTRAVENOUS ONCE
Status: COMPLETED | OUTPATIENT
Start: 2024-04-14 | End: 2024-04-14

## 2024-04-14 RX ORDER — NITROGLYCERIN 10 MG/100ML
INJECTION INTRAVENOUS PRN
Status: DISCONTINUED | OUTPATIENT
Start: 2024-04-14 | End: 2024-04-14

## 2024-04-14 RX ORDER — FENTANYL CITRATE 50 UG/ML
INJECTION, SOLUTION INTRAMUSCULAR; INTRAVENOUS PRN
Status: DISCONTINUED | OUTPATIENT
Start: 2024-04-14 | End: 2024-04-14

## 2024-04-14 RX ORDER — LABETALOL HYDROCHLORIDE 5 MG/ML
10 INJECTION, SOLUTION INTRAVENOUS EVERY 4 HOURS PRN
Status: DISCONTINUED | OUTPATIENT
Start: 2024-04-14 | End: 2024-04-22 | Stop reason: HOSPADM

## 2024-04-14 RX ORDER — FENTANYL CITRATE 50 UG/ML
25-50 INJECTION, SOLUTION INTRAMUSCULAR; INTRAVENOUS
Status: DISCONTINUED | OUTPATIENT
Start: 2024-04-14 | End: 2024-04-15

## 2024-04-14 RX ORDER — PROPOFOL 10 MG/ML
INJECTION, EMULSION INTRAVENOUS PRN
Status: DISCONTINUED | OUTPATIENT
Start: 2024-04-14 | End: 2024-04-14

## 2024-04-14 RX ORDER — ROSUVASTATIN CALCIUM 10 MG/1
10 TABLET, COATED ORAL DAILY
Status: DISCONTINUED | OUTPATIENT
Start: 2024-04-15 | End: 2024-04-22 | Stop reason: HOSPADM

## 2024-04-14 RX ORDER — SODIUM CHLORIDE, SODIUM LACTATE, POTASSIUM CHLORIDE, CALCIUM CHLORIDE 600; 310; 30; 20 MG/100ML; MG/100ML; MG/100ML; MG/100ML
INJECTION, SOLUTION INTRAVENOUS CONTINUOUS PRN
Status: DISCONTINUED | OUTPATIENT
Start: 2024-04-14 | End: 2024-04-14

## 2024-04-14 RX ORDER — ACETAMINOPHEN 325 MG/1
975 TABLET ORAL EVERY 8 HOURS
Status: DISCONTINUED | OUTPATIENT
Start: 2024-04-14 | End: 2024-04-22 | Stop reason: HOSPADM

## 2024-04-14 RX ORDER — METOPROLOL TARTRATE 25 MG/1
25 TABLET, FILM COATED ORAL 2 TIMES DAILY
Status: DISCONTINUED | OUTPATIENT
Start: 2024-04-14 | End: 2024-04-15

## 2024-04-14 RX ORDER — DEXMEDETOMIDINE HYDROCHLORIDE 4 UG/ML
.1-1.2 INJECTION, SOLUTION INTRAVENOUS CONTINUOUS
Status: DISCONTINUED | OUTPATIENT
Start: 2024-04-14 | End: 2024-04-16

## 2024-04-14 RX ORDER — METRONIDAZOLE 500 MG/100ML
500 INJECTION, SOLUTION INTRAVENOUS EVERY 8 HOURS
Status: DISCONTINUED | OUTPATIENT
Start: 2024-04-14 | End: 2024-04-19

## 2024-04-14 RX ORDER — AMLODIPINE BESYLATE 5 MG/1
5 TABLET ORAL DAILY
Status: DISCONTINUED | OUTPATIENT
Start: 2024-04-14 | End: 2024-04-18

## 2024-04-14 RX ADMIN — CEFTRIAXONE SODIUM 2 G: 2 INJECTION, POWDER, FOR SOLUTION INTRAMUSCULAR; INTRAVENOUS at 15:25

## 2024-04-14 RX ADMIN — FENTANYL CITRATE 50 MCG: 50 INJECTION INTRAMUSCULAR; INTRAVENOUS at 11:42

## 2024-04-14 RX ADMIN — TIMOLOL MALEATE 1 DROP: 5 SOLUTION/ DROPS OPHTHALMIC at 07:48

## 2024-04-14 RX ADMIN — PANTOPRAZOLE SODIUM 40 MG: 40 INJECTION, POWDER, FOR SOLUTION INTRAVENOUS at 07:30

## 2024-04-14 RX ADMIN — NITROGLYCERIN 100 MCG: 10 INJECTION INTRAVENOUS at 11:29

## 2024-04-14 RX ADMIN — POTASSIUM CHLORIDE 10 MEQ: 7.46 INJECTION, SOLUTION INTRAVENOUS at 05:57

## 2024-04-14 RX ADMIN — FENTANYL CITRATE 50 MCG: 50 INJECTION INTRAMUSCULAR; INTRAVENOUS at 11:34

## 2024-04-14 RX ADMIN — SODIUM CHLORIDE, POTASSIUM CHLORIDE, SODIUM LACTATE AND CALCIUM CHLORIDE: 600; 310; 30; 20 INJECTION, SOLUTION INTRAVENOUS at 01:19

## 2024-04-14 RX ADMIN — DEXMEDETOMIDINE HYDROCHLORIDE IN SODIUM CHLORIDE 0.2 MCG/KG/HR: 4 INJECTION INTRAVENOUS at 13:40

## 2024-04-14 RX ADMIN — METRONIDAZOLE 500 MG: 500 INJECTION, SOLUTION INTRAVENOUS at 04:22

## 2024-04-14 RX ADMIN — NITROGLYCERIN 100 MCG: 10 INJECTION INTRAVENOUS at 11:30

## 2024-04-14 RX ADMIN — POTASSIUM CHLORIDE 10 MEQ: 7.46 INJECTION, SOLUTION INTRAVENOUS at 18:46

## 2024-04-14 RX ADMIN — ACETAMINOPHEN 975 MG: 325 TABLET, FILM COATED ORAL at 13:32

## 2024-04-14 RX ADMIN — SODIUM CHLORIDE, POTASSIUM CHLORIDE, SODIUM LACTATE AND CALCIUM CHLORIDE: 600; 310; 30; 20 INJECTION, SOLUTION INTRAVENOUS at 10:54

## 2024-04-14 RX ADMIN — Medication 50 MCG: at 13:00

## 2024-04-14 RX ADMIN — PROPOFOL 40 MG: 10 INJECTION, EMULSION INTRAVENOUS at 10:56

## 2024-04-14 RX ADMIN — METOPROLOL TARTRATE 25 MG: 25 TABLET, FILM COATED ORAL at 21:07

## 2024-04-14 RX ADMIN — FENTANYL CITRATE 100 MCG: 50 INJECTION INTRAMUSCULAR; INTRAVENOUS at 11:30

## 2024-04-14 RX ADMIN — NITROGLYCERIN 100 MCG: 10 INJECTION INTRAVENOUS at 12:00

## 2024-04-14 RX ADMIN — Medication 10 MG: at 11:29

## 2024-04-14 RX ADMIN — PROPOFOL 25 MCG/KG/MIN: 10 INJECTION, EMULSION INTRAVENOUS at 07:48

## 2024-04-14 RX ADMIN — FENTANYL CITRATE 100 MCG: 50 INJECTION INTRAMUSCULAR; INTRAVENOUS at 11:19

## 2024-04-14 RX ADMIN — ACETAMINOPHEN 975 MG: 325 TABLET, FILM COATED ORAL at 21:07

## 2024-04-14 RX ADMIN — ROSUVASTATIN CALCIUM 20 MG: 20 TABLET, FILM COATED ORAL at 07:30

## 2024-04-14 RX ADMIN — METRONIDAZOLE 500 MG: 500 INJECTION, SOLUTION INTRAVENOUS at 20:58

## 2024-04-14 RX ADMIN — AMLODIPINE BESYLATE 5 MG: 5 TABLET ORAL at 13:32

## 2024-04-14 RX ADMIN — PHENYLEPHRINE HYDROCHLORIDE 100 MCG: 10 INJECTION INTRAVENOUS at 11:53

## 2024-04-14 RX ADMIN — SODIUM CHLORIDE, POTASSIUM CHLORIDE, SODIUM LACTATE AND CALCIUM CHLORIDE: 600; 310; 30; 20 INJECTION, SOLUTION INTRAVENOUS at 23:27

## 2024-04-14 RX ADMIN — METRONIDAZOLE 500 MG: 500 INJECTION, SOLUTION INTRAVENOUS at 11:30

## 2024-04-14 RX ADMIN — SUGAMMADEX 200 MG: 100 INJECTION, SOLUTION INTRAVENOUS at 12:29

## 2024-04-14 RX ADMIN — Medication 50 MG: at 10:56

## 2024-04-14 RX ADMIN — PHENYLEPHRINE HYDROCHLORIDE 100 MCG: 10 INJECTION INTRAVENOUS at 11:00

## 2024-04-14 ASSESSMENT — ACTIVITIES OF DAILY LIVING (ADL)
ADLS_ACUITY_SCORE: 57

## 2024-04-14 NOTE — OP NOTE
[facility]    Brief Operative Note    Pre-operative diagnosis: H/O exploratory laparotomy [Z98.890]   Post-operative diagnosis * No post-op diagnosis entered *   Procedure: Procedure(s):  Abdominal Re-Exploration and Closure   Surgeon: Surgeons and Role:     * Anthony Trammell MD - Primary     * Xiang Masters MD - Resident - Assisting     * Matthew García MD - Resident - Assisting   Anesthesia: General    Estimated blood loss: Minimal   Drains: None   Specimens: * No specimens in log *   Findings: Healthy bowel. Small amount of bleeding from an area on proximal jejunum where adhesions had been taken down. Minimal clot in abdomen.        Complications: None.   Implants: None.           INDICATIONS FOR PROCEDURE    After understanding the risks and benefits of proceeding with an exploratory laparotomy, the patient's spouse agreed to an operation.    General risks related to abdominal surgery were reviewed with the patient's spouse.    These include, but are not limited to, death, myocardial infarction, pneumonia, urinary tract infection, deep venous thrombosis with or without pulmonary embolus, abdominal infection from bowel injury or abscess, bowel obstruction, wound infection, and bleeding.      OPERATIVE PROCEDURE:  Under the benefits of general endotracheal anesthesia, the Abthera was removed. There was a small amount of clot on top of the abthera.     The abdomen was completely inspected. The quick-clot gauze that had been left yesterday was removed. The colonic mesentery that had been bleeding yesterday was inspected and was hemostatic. The entire small bowel was run and the area concerning for ischemia yesterday now looked pink and viable and was peristalsing. The area of proximal jejunum that had had vascularized adhesions had some areas of bruising but overall looked healthy; there was a small amount of clot adherent to the bowel here that was  carefully removed and there was one small oozing focus that was oversewn with a 3-0 silk suture    Peritoneal lavage was performed using 2 L normal saline.    The abdomen was inspected for hemostasis.      The fascia was closed using 0 non-looped PDS and the subcutaneous tissue was irrigated with saline.  The skin was closed with skin staples.    Sterile dressings were applied.  The patient tolerated the procedure well.      Dr. Trammell was scrubbed for all critical components of the operation.    All sponge and needle counts were correct x 2 at the end of the procedure.    Xiang Masters MD, PhD, PGY-5  General Surgery

## 2024-04-14 NOTE — PROGRESS NOTES
SURGICAL ICU PROGRESS NOTE  04/14/2024      PRIMARY TEAM: EGS  PRIMARY PHYSICIAN: Dr. Trammell in EGS  REASON FOR CRITICAL CARE ADMISSION: Post-operative management   ADMITTING PHYSICIAN: Feliberto Carr  Date of Service (when I saw the patient): 04/14/2024     ASSESSMENT:  Tessa Mansfield is a 89 year old female with a history of cholecystectomy and hysterectomy, CAD s/p multiple stents, CKD, Afib with Apixaban, reversed in the OR with PCC, who was admitted on 4/13/2024 with abdominal pain and clinical signs of bowel obstruction with possible ischemia. She is S/p exploratory lap, found with hemorrhagic bowel without necrosis, small bowel torsion around adhesions between small bowel and transverse colon epiploa. S/p Emergent take back (4/13) due to hypotension and acute anemia      She was brought to the ICU with an open abdomen with a plan to RTOR today.     PLAN:  - Return to OR for closure  - 1 unit of platelets ordered. Platelets at 44.   - Hemoglobin stable  - Re-draw labs post-op  - Monitor urine output. Decreased urine output to ~20mL/hr.    Neurological:  # Acute post-operative pain  - Monitor neurological status.  - Keep appropriately sedated for synchronicity and pain control  - Pain: Fentanyl gtt                   - Sedation plan: Propofol gtt     Pulmonary:   # Post operative ventilatory support  # Acute hypoxic respiratory support   - VENT:   RR = 15, FiO2 40, PEEP 5mmHg, Vt: 450mL  Sating 100%  Continue full vent support. PST when meets criteria.  Ventilatory bundle. HOB elevation   - Supplemental oxygen to keep saturation above 92 %.  - Holding Budesonide PTA     Cardiovascular:    # History of Atrial Fibrillation on Apixaban  # History of CAD, sp multiple stents  # Shock-distributive   - EKG baseline performed 4/13, demonstrating Atrial-paced with PVCs  - Last echo (10/9/2023), demonstrating EF of 55-60% with mild-moderate mitral insufficiency, with RVP 35 mmHg above RAP  - Monitor hemodynamic status  -  Continue pressors as needed, wean as able.   - Holding PTA Amlodipine  - Holding PTA Apixaban  - Holding PTA Plavix  - Holding PTA metoprolol 50 BID  - Holding PTA Lasix 40 daily  - Restart PTA rosuvastatin   - Trend lactate 1.5 to 1.7. Down from 4.8 on 4/14.      Gastroenterology/Nutrition:  # s/p exploratory laparotomy x2   # Protein calorie deficit malnutrition   - Has NG tube  - Wound vac in place, record outputs  - Plan for RTOR tomorrow per primary team     Renal/Fluids/Electrolytes:   # History of CKD (baseline creatinine 1.7)  - /hr for IV fluid hydration and blood products  - Low urine output ~20mL/hr over last 2 hours.   - Urine output is low, continue to monitor closely . Will continue to monitor intake and output.  - Holding PTA allopurinol  - ICU Electrolyte replacement protocol (Mag, Phos, K)     Endocrine:  # At risk for stress hyperglycemia   - No management indication.   - Will consider Sliding scale for glucose management. Goal to keep BG< 180 for optimal wound healing      ID:  # Concern for bowel necrosis  # Leukocytosis   - Will continue Ceftriaxone/Flagyl  - Trend WC and fevers  - Fever work-up if indicated     Heme:     # Acute blood loss anemia   # Anemia of critical illness  - Transfuse if hgb <7.0 or signs/symptoms of hypoperfusion. Monitor and trend.   - Will send coags, CBC and CMP once in unit, trending and transfusing as indicated  - Received 3 U PRBCs and 1 U Platelets in OR (4/14), and 1 unit of platelets today.      Musculoskeletal:  # Weakness and deconditioning of critical illness   - Physical and occupational therapy consult      Skin:  # At risk for pressure ulcers   - Appreciate excellent nursing cares  - Frequent turning as indicated     General Cares/Prophylaxis:    DVT Prophylaxis: Pneumatic Compression Devices  GI Prophylaxis: PPI  Restraints: Restraints for medical healing needed: Yes. Utilizing soft restraints     Lines/ tubes/ drains:  - ETT  - 3 PIV  - Arterial  Line  - Bonner  - Wound Vac     Disposition:  - Surgical ICU     Patient seen, findings and plan discussed with surgical ICU staff, Dr. Feliberto Jennings MD    ====================================      OBJECTIVE:     Temp:  [95.2  F (35.1  C)-99.3  F (37.4  C)] 98.6  F (37  C)  Pulse:  [60-91] 76  Resp:  [12-26] 12  BP: ()/() 139/109  MAP:  [58 mmHg-108 mmHg] 80 mmHg  Arterial Line BP: ()/(30-80) 136/49  FiO2 (%):  [40 %-60 %] 40 %  SpO2:  [95 %-100 %] 100 %  Vent Mode: CMV/AC  (Continuous Mandatory Ventilation/ Assist Control)  FiO2 (%): 40 %  Resp Rate (Set): 12 breaths/min  Tidal Volume (Set, mL): 450 mL  PEEP (cm H2O): 5 cmH2O  Resp: 12      I/O last 3 completed shifts:  In: 7615.4 [I.V.:5929.4]  Out: 2040 [Urine:940; Emesis/NG output:100; Drains:1000]    General/Neuro: Intubated and sedated.   CV: Regular rate and sinus rhythm per telemetry.   Pulm: Mechanically ventilated. FiO2: 40%, PEEP: 5 mmHg. Symmetrical chest rise  Abd: soft; abthera temporary closure device in place.   Extremities: no edema, warm and well perfused. Pedal pulses palpated.  Skin: warm and well-perfused.   Devices:NG, Bonner, Left arterial line, 2 x Left PIV and 1 x Right PIV, ET Tube, abthera temporary closure device     LABS:   Arterial Blood Gases   Recent Labs   Lab 04/13/24  1435 04/13/24  1212   PH 7.36 7.31*   PCO2 39 35   PO2 113* 362*   HCO3 22 18*     Complete Blood Count   Recent Labs   Lab 04/14/24  0428 04/13/24  2259 04/13/24 2015 04/13/24  1431   WBC 27.6* 22.4* 18.9* 14.1*   HGB 8.8* 9.4* 9.2* 9.3*   PLT 44* 40* 36* 31*     Basic Metabolic Panel  Recent Labs   Lab 04/14/24  0551 04/14/24  0428 04/14/24  0424 04/13/24 2021 04/13/24  1431 04/13/24  1301 04/13/24  1212 04/13/24  1027 04/13/24  0317   NA  --  143  --   --  144  144  --  142 143 145   POTASSIUM  --  3.8  --   --  3.8  3.8  --  4.0 3.7 3.2*   CHLORIDE  --  107  --   --  107  107  --   --  109* 105   CO2  --  22  --   --  20*  20*   --   --  19* 24   BUN  --  24.6*  --   --  22.5  22.5  --   --  26.0* 30.2*   CR  --  1.96*  --   --  1.59*  1.59*  --   --  1.73* 1.81*   * 120* 110* 116* 124*  124*   < > 194* 209* 94    < > = values in this interval not displayed.     Liver Function Tests  Recent Labs   Lab 04/14/24 0428 04/13/24  1431 04/13/24 0317 04/11/24  1519   AST  --  69* 29  --    ALT  --  30 13  --    ALKPHOS  --  44 84  --    BILITOTAL  --  0.3 0.4  --    ALBUMIN  --  2.9* 4.5 4.3   INR 1.59* 1.81*  --   --      Pancreatic Enzymes  Recent Labs   Lab 04/13/24 0317   LIPASE 69*     Coagulation Profile  Recent Labs   Lab 04/14/24 0428 04/13/24  1431   INR 1.59* 1.81*   PTT  --  42*         IMAGING:   Recent Results (from the past 24 hour(s))   XR Abdomen Port 1 View    Narrative    EXAMINATION:  XR ABDOMEN PORT 1 VIEW 4/13/2024     COMPARISON: Same date CT abdomen    HISTORY: positioning of NG.    FINDINGS: Enteric tube tip and sidehole project over the stomach. IVC  filter. No abnormally dilated loops of bowel or pneumatosis in the  visualized abdomen. Partially visualized bilateral total hip  arthroplasties.      Impression    IMPRESSION: Enteric tube tip and sidehole project over the stomach.    I have personally reviewed the examination and initial interpretation  and I agree with the findings.    HORACIO VASQUEZ MD         SYSTEM ID:  [id number]   XR Chest Port 1 View    Narrative    Portable chest    INDICATION: Intubation    COMPARISON: 3/17/2024    FINDINGS: Heart size normal. Minimal bilateral costophrenic angle  haziness with increased blunting on the left. Left subclavian  transvenous approach dual lead pacemaker again present.  Atherosclerotic calcification again at the aortic knob.  Endotracheal tube is now present with tip approximately 5.1 cm above  the kirby. NG/OG tube beyond the inferior margin of the image.      Impression    IMPRESSION: Mild increased edema with perhaps small left greater than  right  pleural effusions. Intubated. Pacemaker. Atherosclerosis.    HORACIO VASQUEZ MD         SYSTEM ID:  [id number]

## 2024-04-14 NOTE — ANESTHESIA CARE TRANSFER NOTE
Patient: Tessa Mansfield    Procedure: Procedure(s):  Abdominal Re-Exploration and Closure       Diagnosis: H/O exploratory laparotomy [Z98.890]  Diagnosis Additional Information: No value filed.    Anesthesia Type:   General     Note:      Level of Consciousness: iatrogenic sedation        Dentition: dentition unchanged  Vital Signs Stable: post-procedure vital signs reviewed and stable  Report to RN Given: handoff report given  Patient transferred to: ICU    ICU Handoff: Call for PAUSE to initiate/utilize ICU HANDOFF, Identified Patient, Identified Responsible Provider, Reviewed the Pertinent Medical History, Discussed Surgical Course, Reviewed Intra-OP Anesthesia Management and Issues during Anesthesia, Set Expectations for Post Procedure Period and Allowed Opportunity for Questions and Acknowledgement of Understanding  Vitals:  Vitals Value Taken Time   BP     Temp     Pulse     Resp     SpO2         Electronically Signed By: JI Isidro CRNA  April 14, 2024  12:35 PM

## 2024-04-14 NOTE — PROGRESS NOTES
"  Emergency General Surgery Progress Note  Surgery Cross-Cover  Post Op Check    04/14/2024    Tessa Mansfield is a 89 year old female POD#0 s/p Procedure(s):  Abdominal Re-Exploration and Closure for Pre-Op Diagnosis Codes:     * Small bowel obstruction (H) [K56.609]    Pt reports their pain is controlled with current regimen. Denies nausea, SOB, chest pain, or dizziness. Patient is voiding via hameed catheter.     BP (!) 139/109   Pulse 85   Temp 97  F (36.1  C)   Resp 14   Ht 1.676 m (5' 6\")   Wt 73.6 kg (162 lb 4.1 oz)   SpO2 99%   BMI 26.19 kg/m      Gen: A&O x4, NAD   Chest: breathing non-labored on nasal cannula at 3 liters per minute   Abdomen: soft, non-tender, non-distended. Dressing intact with   Incision: clean, dry, intact closed with staples. Dressing with minimal strikethrough.  Extremities: warm and well perfused.  Devices: Nasal cannula, NG, and Left arterial line PIV LUE x2. RUE PIV x 1.    A/P: No acute post-op issues. The patient is extubated. Patient is having low urine output at around 20mL/hr. Labs post-op are pending. CLD tomorrow.  Continue plan of care per primary team. Please call with any questions.    Sarkis Jennings MD   "

## 2024-04-14 NOTE — ANESTHESIA POSTPROCEDURE EVALUATION
Patient: Tessa Mansfield    Procedure: Procedure(s):  Abdominal Re-Exploration and Closure       Anesthesia Type:  General    Note:  Disposition: Inpatient   Postop Pain Control: Uneventful            Sign Out: Well controlled pain   PONV: No   Neuro/Psych: Uneventful            Sign Out: Acceptable/Baseline neuro status   Airway/Respiratory: Uneventful            Sign Out: AIRWAY IN SITU/Resp. Support   CV/Hemodynamics: Uneventful            Sign Out: Acceptable CV status; No obvious hypovolemia; No obvious fluid overload   Other NRE:    DID A NON-ROUTINE EVENT OCCUR?            Last vitals:  Vitals Value Taken Time   BP     Temp 36.1  C (97  F) 04/14/24 1307   Pulse 83 04/14/24 1355   Resp 12 04/14/24 1300   SpO2 100 % 04/14/24 1355   Vitals shown include unfiled device data.    Electronically Signed By: Veda Zamora MD  April 14, 2024  1:55 PM

## 2024-04-14 NOTE — PROGRESS NOTES
Neuro: Patient responds to touch, withdraws in all extremities, pupils equal and reactive.   CV: SR, intermittently A paced, occasional PACs. Pulses palpable and weak. Afebrile. MAP goal >65 maintained overnight. LR increased from 75 to 100ml/hr per SICU MD this morning.  Pulm: CMV/AC 40%/450/12/5, scant ETT secretions, Lungs clear and diminished in bases. Afebrile.   GI/: NG to LIS, NPO, no BM overnight, hameed catheter w/35-40ml/hr UOP.  Gtts: fentanyl @50, Propofol @25, LR @100ml/hr.  Skin: Abdominal wound vac in place    Plan: Anticipate back to the OR today to close up.    For vital signs and complete assessments, please see documentation flowsheets.

## 2024-04-14 NOTE — PROGRESS NOTES
Pt back from OR around 13:00 s/p abd re-exploration and closure. Pt continued on Propofol upon arrival but soon shut off and changed to Precedex gtt. Fentanyl restarted at 50mcg/hr initially with 1 bump 50mcg given upon arrival to 4E, sbp 150-160s. Fentanyl turned off at 14:50. Pt sedated/lethargic but open eyes to command and able to follow simple commands. Abd dressing c/d/i,... UO  25ml/hr... will monitor closely along with VS. Family not present at the bedside but was here this am and aware of the plan.

## 2024-04-14 NOTE — PROGRESS NOTES
Admitted/transferred from: PACU at 14:00  Reason for admission/transfer: Pt needing ICU monitoring  s/p ex-lap, low Hgb in OR, etc  2 RN skin assessment: completed by Alicia ARAGON RN and Ysabel STEINBERG RN  Result of skin assessment and interventions/actions: Abd wound vac in place, Right FA skin tear (mepilex lite placed), left upper lip abrasion/skin tear/ecchymotic, Right hip/back... linear scratch, buttock intact (mepilex placed)... ecchymotic t/o body with patchy/scaly spots t/o body also.   Height, weight, drug calc weight: Done  Patient belongings (see Flowsheet)   Clothes/shoes, glasses and gold bracelet (in med lock box)  MDRO education added to care plan: N/A      Maintained MAP >65 on Norepinephrine gtt but weaned off by end of shift. PLT 31, 1pk PLT given. Plan to check CBCP after PLT completed. (Night RN aware)  ALL labs checked prior (see result) Pt remains vented/sedated on Propofol and Fentanyl gtt. Pt was able to ARRIOLA slightly earlier with turning.  Withdraws to pain. RASS -3 to -4 per order  ?

## 2024-04-14 NOTE — OR NURSING
Abdominal packing removed, one large quick clot. X-ray performed and read by radiologist, Dr. Lerma. Surgical incision closed.

## 2024-04-15 ENCOUNTER — APPOINTMENT (OUTPATIENT)
Dept: SPEECH THERAPY | Facility: CLINIC | Age: 89
DRG: 329 | End: 2024-04-15
Payer: COMMERCIAL

## 2024-04-15 ENCOUNTER — APPOINTMENT (OUTPATIENT)
Dept: GENERAL RADIOLOGY | Facility: CLINIC | Age: 89
DRG: 329 | End: 2024-04-15
Payer: COMMERCIAL

## 2024-04-15 LAB
ACANTHOCYTES BLD QL SMEAR: ABNORMAL
ACANTHOCYTES BLD QL SMEAR: ABNORMAL
ALLEN'S TEST: ABNORMAL
ANION GAP SERPL CALCULATED.3IONS-SCNC: 11 MMOL/L (ref 7–15)
AUER BODIES BLD QL SMEAR: ABNORMAL
AUER BODIES BLD QL SMEAR: ABNORMAL
BASE EXCESS BLDA CALC-SCNC: -0.3 MMOL/L (ref -3–3)
BASE EXCESS BLDV CALC-SCNC: 3.6 MMOL/L (ref -3–3)
BASO STIPL BLD QL SMEAR: ABNORMAL
BASO STIPL BLD QL SMEAR: ABNORMAL
BITE CELLS BLD QL SMEAR: ABNORMAL
BITE CELLS BLD QL SMEAR: ABNORMAL
BLD PROD TYP BPU: NORMAL
BLISTER CELLS BLD QL SMEAR: ABNORMAL
BLISTER CELLS BLD QL SMEAR: ABNORMAL
BLOOD COMPONENT TYPE: NORMAL
BUN SERPL-MCNC: 29.3 MG/DL (ref 8–23)
BURR CELLS BLD QL SMEAR: ABNORMAL
BURR CELLS BLD QL SMEAR: ABNORMAL
CALCIUM SERPL-MCNC: 7.9 MG/DL (ref 8.8–10.2)
CHLORIDE SERPL-SCNC: 108 MMOL/L (ref 98–107)
CODING SYSTEM: NORMAL
COHGB MFR BLD: 85.4 % (ref 95–96)
CREAT SERPL-MCNC: 2.04 MG/DL (ref 0.51–0.95)
CROSSMATCH: NORMAL
DACRYOCYTES BLD QL SMEAR: ABNORMAL
DACRYOCYTES BLD QL SMEAR: ABNORMAL
DEPRECATED HCO3 PLAS-SCNC: 23 MMOL/L (ref 22–29)
EGFRCR SERPLBLD CKD-EPI 2021: 23 ML/MIN/1.73M2
ELLIPTOCYTES BLD QL SMEAR: ABNORMAL
ELLIPTOCYTES BLD QL SMEAR: ABNORMAL
ERYTHROCYTE [DISTWIDTH] IN BLOOD BY AUTOMATED COUNT: 19.9 % (ref 10–15)
ERYTHROCYTE [DISTWIDTH] IN BLOOD BY AUTOMATED COUNT: 20.4 % (ref 10–15)
ERYTHROCYTE [DISTWIDTH] IN BLOOD BY AUTOMATED COUNT: 21 % (ref 10–15)
FRAGMENTS BLD QL SMEAR: ABNORMAL
FRAGMENTS BLD QL SMEAR: ABNORMAL
GLUCOSE BLDC GLUCOMTR-MCNC: 106 MG/DL (ref 70–99)
GLUCOSE BLDC GLUCOMTR-MCNC: 99 MG/DL (ref 70–99)
GLUCOSE SERPL-MCNC: 104 MG/DL (ref 70–99)
HCO3 BLD-SCNC: 26 MMOL/L (ref 21–28)
HCO3 BLDV-SCNC: 28 MMOL/L (ref 21–28)
HCT VFR BLD AUTO: 21.8 % (ref 35–47)
HCT VFR BLD AUTO: 24.6 % (ref 35–47)
HCT VFR BLD AUTO: 25.9 % (ref 35–47)
HGB BLD-MCNC: 6.9 G/DL (ref 11.7–15.7)
HGB BLD-MCNC: 8.1 G/DL (ref 11.7–15.7)
HGB BLD-MCNC: 8.3 G/DL (ref 11.7–15.7)
HGB C CRYSTALS: ABNORMAL
HGB C CRYSTALS: ABNORMAL
HOWELL-JOLLY BOD BLD QL SMEAR: ABNORMAL
HOWELL-JOLLY BOD BLD QL SMEAR: ABNORMAL
ISSUE DATE AND TIME: NORMAL
MAGNESIUM SERPL-MCNC: 2 MG/DL (ref 1.7–2.3)
MCH RBC QN AUTO: 26.9 PG (ref 26.5–33)
MCH RBC QN AUTO: 27.2 PG (ref 26.5–33)
MCH RBC QN AUTO: 27.7 PG (ref 26.5–33)
MCHC RBC AUTO-ENTMCNC: 31.7 G/DL (ref 31.5–36.5)
MCHC RBC AUTO-ENTMCNC: 32 G/DL (ref 31.5–36.5)
MCHC RBC AUTO-ENTMCNC: 32.9 G/DL (ref 31.5–36.5)
MCV RBC AUTO: 84 FL (ref 78–100)
MCV RBC AUTO: 84 FL (ref 78–100)
MCV RBC AUTO: 86 FL (ref 78–100)
NEUTS HYPERSEG BLD QL SMEAR: ABNORMAL
NEUTS HYPERSEG BLD QL SMEAR: ABNORMAL
O2/TOTAL GAS SETTING VFR VENT: 21 %
O2/TOTAL GAS SETTING VFR VENT: 30 %
OXYHGB MFR BLDV: 96 % (ref 70–75)
PCO2 BLD: 50 MM HG (ref 35–45)
PCO2 BLDV: 40 MM HG (ref 40–50)
PH BLD: 7.33 [PH] (ref 7.35–7.45)
PH BLDV: 7.45 [PH] (ref 7.32–7.43)
PHOSPHATE SERPL-MCNC: 3.1 MG/DL (ref 2.5–4.5)
PLAT MORPH BLD: ABNORMAL
PLAT MORPH BLD: ABNORMAL
PLATELET # BLD AUTO: 118 10E3/UL (ref 150–450)
PLATELET # BLD AUTO: 35 10E3/UL (ref 150–450)
PLATELET # BLD AUTO: 37 10E3/UL (ref 150–450)
PO2 BLD: 51 MM HG (ref 80–105)
PO2 BLDV: 81 MM HG (ref 25–47)
POLYCHROMASIA BLD QL SMEAR: SLIGHT
POLYCHROMASIA BLD QL SMEAR: SLIGHT
POTASSIUM SERPL-SCNC: 3.6 MMOL/L (ref 3.4–5.3)
POTASSIUM SERPL-SCNC: 3.8 MMOL/L (ref 3.4–5.3)
RBC # BLD AUTO: 2.54 10E6/UL (ref 3.8–5.2)
RBC # BLD AUTO: 2.92 10E6/UL (ref 3.8–5.2)
RBC # BLD AUTO: 3.08 10E6/UL (ref 3.8–5.2)
RBC AGGLUT BLD QL: ABNORMAL
RBC AGGLUT BLD QL: ABNORMAL
RBC MORPH BLD: ABNORMAL
RBC MORPH BLD: ABNORMAL
ROULEAUX BLD QL SMEAR: ABNORMAL
ROULEAUX BLD QL SMEAR: ABNORMAL
SAO2 % BLDA: 84 % (ref 92–100)
SAO2 % BLDV: 97.4 % (ref 70–75)
SICKLE CELLS BLD QL SMEAR: ABNORMAL
SICKLE CELLS BLD QL SMEAR: ABNORMAL
SMUDGE CELLS BLD QL SMEAR: ABNORMAL
SMUDGE CELLS BLD QL SMEAR: ABNORMAL
SODIUM SERPL-SCNC: 142 MMOL/L (ref 135–145)
SPHEROCYTES BLD QL SMEAR: ABNORMAL
SPHEROCYTES BLD QL SMEAR: ABNORMAL
STOMATOCYTES BLD QL SMEAR: ABNORMAL
STOMATOCYTES BLD QL SMEAR: ABNORMAL
TARGETS BLD QL SMEAR: ABNORMAL
TARGETS BLD QL SMEAR: ABNORMAL
TOXIC GRANULES BLD QL SMEAR: ABNORMAL
TOXIC GRANULES BLD QL SMEAR: ABNORMAL
UNIT ABO/RH: NORMAL
UNIT NUMBER: NORMAL
UNIT STATUS: NORMAL
UNIT TYPE ISBT: 1700
UNIT TYPE ISBT: 6200
UNIT TYPE ISBT: 9500
VARIANT LYMPHS BLD QL SMEAR: ABNORMAL
VARIANT LYMPHS BLD QL SMEAR: ABNORMAL
WBC # BLD AUTO: 15.2 10E3/UL (ref 4–11)
WBC # BLD AUTO: 16.3 10E3/UL (ref 4–11)
WBC # BLD AUTO: 20.6 10E3/UL (ref 4–11)

## 2024-04-15 PROCEDURE — 83735 ASSAY OF MAGNESIUM: CPT | Performed by: STUDENT IN AN ORGANIZED HEALTH CARE EDUCATION/TRAINING PROGRAM

## 2024-04-15 PROCEDURE — 200N000002 HC R&B ICU UMMC

## 2024-04-15 PROCEDURE — 250N000013 HC RX MED GY IP 250 OP 250 PS 637: Performed by: SURGERY

## 2024-04-15 PROCEDURE — 85027 COMPLETE CBC AUTOMATED: CPT

## 2024-04-15 PROCEDURE — 250N000013 HC RX MED GY IP 250 OP 250 PS 637: Performed by: STUDENT IN AN ORGANIZED HEALTH CARE EDUCATION/TRAINING PROGRAM

## 2024-04-15 PROCEDURE — 92610 EVALUATE SWALLOWING FUNCTION: CPT | Mod: GN

## 2024-04-15 PROCEDURE — 999N000157 HC STATISTIC RCP TIME EA 10 MIN

## 2024-04-15 PROCEDURE — 36415 COLL VENOUS BLD VENIPUNCTURE: CPT | Performed by: SURGERY

## 2024-04-15 PROCEDURE — 250N000013 HC RX MED GY IP 250 OP 250 PS 637

## 2024-04-15 PROCEDURE — 84132 ASSAY OF SERUM POTASSIUM: CPT | Performed by: SURGERY

## 2024-04-15 PROCEDURE — 80048 BASIC METABOLIC PNL TOTAL CA: CPT | Performed by: STUDENT IN AN ORGANIZED HEALTH CARE EDUCATION/TRAINING PROGRAM

## 2024-04-15 PROCEDURE — 250N000011 HC RX IP 250 OP 636: Performed by: STUDENT IN AN ORGANIZED HEALTH CARE EDUCATION/TRAINING PROGRAM

## 2024-04-15 PROCEDURE — 250N000011 HC RX IP 250 OP 636: Mod: JZ | Performed by: STUDENT IN AN ORGANIZED HEALTH CARE EDUCATION/TRAINING PROGRAM

## 2024-04-15 PROCEDURE — 250N000009 HC RX 250

## 2024-04-15 PROCEDURE — 94640 AIRWAY INHALATION TREATMENT: CPT

## 2024-04-15 PROCEDURE — P9037 PLATE PHERES LEUKOREDU IRRAD: HCPCS

## 2024-04-15 PROCEDURE — 82805 BLOOD GASES W/O2 SATURATION: CPT

## 2024-04-15 PROCEDURE — P9016 RBC LEUKOCYTES REDUCED: HCPCS

## 2024-04-15 PROCEDURE — 71045 X-RAY EXAM CHEST 1 VIEW: CPT | Mod: 26 | Performed by: RADIOLOGY

## 2024-04-15 PROCEDURE — 71045 X-RAY EXAM CHEST 1 VIEW: CPT

## 2024-04-15 PROCEDURE — C9113 INJ PANTOPRAZOLE SODIUM, VIA: HCPCS | Performed by: STUDENT IN AN ORGANIZED HEALTH CARE EDUCATION/TRAINING PROGRAM

## 2024-04-15 PROCEDURE — 250N000011 HC RX IP 250 OP 636: Performed by: SURGERY

## 2024-04-15 PROCEDURE — 71045 X-RAY EXAM CHEST 1 VIEW: CPT | Mod: 77

## 2024-04-15 PROCEDURE — 85027 COMPLETE CBC AUTOMATED: CPT | Performed by: STUDENT IN AN ORGANIZED HEALTH CARE EDUCATION/TRAINING PROGRAM

## 2024-04-15 PROCEDURE — 250N000011 HC RX IP 250 OP 636

## 2024-04-15 PROCEDURE — 84100 ASSAY OF PHOSPHORUS: CPT | Performed by: STUDENT IN AN ORGANIZED HEALTH CARE EDUCATION/TRAINING PROGRAM

## 2024-04-15 RX ORDER — LIDOCAINE 4 G/G
1 PATCH TOPICAL EVERY 12 HOURS
Status: DISCONTINUED | OUTPATIENT
Start: 2024-04-15 | End: 2024-04-22 | Stop reason: HOSPADM

## 2024-04-15 RX ORDER — ACETYLCYSTEINE 100 MG/ML
4 SOLUTION ORAL; RESPIRATORY (INHALATION) ONCE
Status: COMPLETED | OUTPATIENT
Start: 2024-04-15 | End: 2024-04-15

## 2024-04-15 RX ORDER — POTASSIUM CHLORIDE 750 MG/1
10 TABLET, EXTENDED RELEASE ORAL ONCE
Status: COMPLETED | OUTPATIENT
Start: 2024-04-15 | End: 2024-04-15

## 2024-04-15 RX ORDER — IPRATROPIUM BROMIDE AND ALBUTEROL SULFATE 2.5; .5 MG/3ML; MG/3ML
3 SOLUTION RESPIRATORY (INHALATION)
Status: DISCONTINUED | OUTPATIENT
Start: 2024-04-15 | End: 2024-04-15

## 2024-04-15 RX ORDER — FUROSEMIDE 10 MG/ML
20 INJECTION INTRAMUSCULAR; INTRAVENOUS EVERY 6 HOURS
Status: COMPLETED | OUTPATIENT
Start: 2024-04-15 | End: 2024-04-16

## 2024-04-15 RX ORDER — POTASSIUM CHLORIDE 7.45 MG/ML
10 INJECTION INTRAVENOUS ONCE
Status: COMPLETED | OUTPATIENT
Start: 2024-04-15 | End: 2024-04-15

## 2024-04-15 RX ORDER — MAGNESIUM SULFATE HEPTAHYDRATE 40 MG/ML
2 INJECTION, SOLUTION INTRAVENOUS ONCE
Status: COMPLETED | OUTPATIENT
Start: 2024-04-15 | End: 2024-04-15

## 2024-04-15 RX ORDER — METOPROLOL TARTRATE 50 MG
50 TABLET ORAL 2 TIMES DAILY
Status: DISCONTINUED | OUTPATIENT
Start: 2024-04-15 | End: 2024-04-22 | Stop reason: HOSPADM

## 2024-04-15 RX ORDER — ALBUTEROL SULFATE 0.83 MG/ML
SOLUTION RESPIRATORY (INHALATION)
Status: COMPLETED
Start: 2024-04-15 | End: 2024-04-15

## 2024-04-15 RX ORDER — FUROSEMIDE 10 MG/ML
20 INJECTION INTRAMUSCULAR; INTRAVENOUS DAILY
Status: DISCONTINUED | OUTPATIENT
Start: 2024-04-15 | End: 2024-04-16

## 2024-04-15 RX ORDER — ACETYLCYSTEINE 100 MG/ML
4 SOLUTION ORAL; RESPIRATORY (INHALATION) EVERY 8 HOURS PRN
Status: DISCONTINUED | OUTPATIENT
Start: 2024-04-15 | End: 2024-04-22 | Stop reason: HOSPADM

## 2024-04-15 RX ORDER — HYDROMORPHONE HCL IN WATER/PF 6 MG/30 ML
.2-.5 PATIENT CONTROLLED ANALGESIA SYRINGE INTRAVENOUS EVERY 4 HOURS PRN
Status: DISCONTINUED | OUTPATIENT
Start: 2024-04-15 | End: 2024-04-22 | Stop reason: HOSPADM

## 2024-04-15 RX ORDER — METHOCARBAMOL 500 MG/1
500 TABLET, FILM COATED ORAL EVERY 6 HOURS PRN
Status: DISCONTINUED | OUTPATIENT
Start: 2024-04-15 | End: 2024-04-22 | Stop reason: HOSPADM

## 2024-04-15 RX ORDER — FUROSEMIDE 40 MG
40 TABLET ORAL DAILY
Status: DISCONTINUED | OUTPATIENT
Start: 2024-04-15 | End: 2024-04-15

## 2024-04-15 RX ORDER — ACETYLCYSTEINE 100 MG/ML
4 SOLUTION ORAL; RESPIRATORY (INHALATION) EVERY 4 HOURS PRN
Status: DISCONTINUED | OUTPATIENT
Start: 2024-04-15 | End: 2024-04-15

## 2024-04-15 RX ADMIN — HYDROMORPHONE HYDROCHLORIDE 0.2 MG: 0.2 INJECTION, SOLUTION INTRAMUSCULAR; INTRAVENOUS; SUBCUTANEOUS at 22:16

## 2024-04-15 RX ADMIN — FENTANYL CITRATE 50 MCG: 50 INJECTION, SOLUTION INTRAMUSCULAR; INTRAVENOUS at 03:38

## 2024-04-15 RX ADMIN — MAGNESIUM SULFATE HEPTAHYDRATE 2 G: 2 INJECTION, SOLUTION INTRAVENOUS at 05:37

## 2024-04-15 RX ADMIN — METRONIDAZOLE 500 MG: 500 INJECTION, SOLUTION INTRAVENOUS at 11:26

## 2024-04-15 RX ADMIN — LABETALOL HYDROCHLORIDE 10 MG: 5 INJECTION, SOLUTION INTRAVENOUS at 17:15

## 2024-04-15 RX ADMIN — TIMOLOL MALEATE 1 DROP: 5 SOLUTION/ DROPS OPHTHALMIC at 08:01

## 2024-04-15 RX ADMIN — AMLODIPINE BESYLATE 5 MG: 5 TABLET ORAL at 08:00

## 2024-04-15 RX ADMIN — ROSUVASTATIN CALCIUM 10 MG: 10 TABLET, FILM COATED ORAL at 08:00

## 2024-04-15 RX ADMIN — METHOCARBAMOL 500 MG: 500 TABLET ORAL at 08:02

## 2024-04-15 RX ADMIN — METOPROLOL TARTRATE 50 MG: 50 TABLET, FILM COATED ORAL at 20:23

## 2024-04-15 RX ADMIN — POTASSIUM CHLORIDE 10 MEQ: 7.46 INJECTION, SOLUTION INTRAVENOUS at 22:13

## 2024-04-15 RX ADMIN — METRONIDAZOLE 500 MG: 500 INJECTION, SOLUTION INTRAVENOUS at 20:35

## 2024-04-15 RX ADMIN — LIDOCAINE 4% 1 PATCH: 40 PATCH TOPICAL at 20:24

## 2024-04-15 RX ADMIN — FENTANYL CITRATE 25 MCG: 50 INJECTION, SOLUTION INTRAMUSCULAR; INTRAVENOUS at 10:26

## 2024-04-15 RX ADMIN — ALBUTEROL SULFATE 2.5 MG: 2.5 SOLUTION RESPIRATORY (INHALATION) at 10:57

## 2024-04-15 RX ADMIN — METOPROLOL TARTRATE 25 MG: 25 TABLET, FILM COATED ORAL at 08:00

## 2024-04-15 RX ADMIN — METRONIDAZOLE 500 MG: 500 INJECTION, SOLUTION INTRAVENOUS at 03:39

## 2024-04-15 RX ADMIN — PANTOPRAZOLE SODIUM 40 MG: 40 INJECTION, POWDER, FOR SOLUTION INTRAVENOUS at 08:00

## 2024-04-15 RX ADMIN — CEFTRIAXONE SODIUM 2 G: 2 INJECTION, POWDER, FOR SOLUTION INTRAMUSCULAR; INTRAVENOUS at 15:07

## 2024-04-15 RX ADMIN — HYDRALAZINE HYDROCHLORIDE 10 MG: 20 INJECTION INTRAMUSCULAR; INTRAVENOUS at 18:10

## 2024-04-15 RX ADMIN — FUROSEMIDE 20 MG: 10 INJECTION, SOLUTION INTRAMUSCULAR; INTRAVENOUS at 20:23

## 2024-04-15 RX ADMIN — POTASSIUM CHLORIDE 10 MEQ: 750 TABLET, EXTENDED RELEASE ORAL at 05:38

## 2024-04-15 RX ADMIN — FUROSEMIDE 20 MG: 10 INJECTION, SOLUTION INTRAMUSCULAR; INTRAVENOUS at 12:44

## 2024-04-15 RX ADMIN — ACETAMINOPHEN 975 MG: 325 TABLET, FILM COATED ORAL at 05:16

## 2024-04-15 ASSESSMENT — ACTIVITIES OF DAILY LIVING (ADL)
ADLS_ACUITY_SCORE: 55
ADLS_ACUITY_SCORE: 59
ADLS_ACUITY_SCORE: 57
ADLS_ACUITY_SCORE: 55
ADLS_ACUITY_SCORE: 55
ADLS_ACUITY_SCORE: 59
ADLS_ACUITY_SCORE: 59
ADLS_ACUITY_SCORE: 55
ADLS_ACUITY_SCORE: 57
ADLS_ACUITY_SCORE: 55

## 2024-04-15 NOTE — PROGRESS NOTES
Pt with desaturation into the 70's.  BS with coarse rhonchi alfonso.  Pt NT sx'd by RN and orally sx'd by MD.  Pt given Albuterol neb x1 (mucomyst not avail).  Pt's cough improved with prod of several lrg blood clots.  BS clear after coughing.  Pt placed on HFNC FiO2 .4, flow 25lpm with SaO2 93.

## 2024-04-15 NOTE — PROGRESS NOTES
Major Shift Events: lethargic, oriented X4, soft/whisper voice, able to make her needs known. SR, afebrile. 1unit PRBC's and 2 units of platelets given per order. No transfusion reaction noted. Labetalol and hydralazine given. Pt this AM coughing up large amount of bloody clots orally, desaturated to 70's, MD at bedside, NT suctioned and bagged, once pt recovered placed on HFNC, tolerating well. Per SLP passed swallow study, ok for clear liquids.No BM, incision dressing CDI. Bonner in place, lasix given X 1, good UOP. Family at bedside intermittently.   Plan: Continue POC  For vital signs and complete assessments, please see documentation flowsheets.

## 2024-04-15 NOTE — PROGRESS NOTES
SURGICAL ICU PROGRESS NOTE  04/15/2024      PRIMARY TEAM: EGS  PRIMARY PHYSICIAN: Anthony Trammell MD    REASON FOR CRITICAL CARE ADMISSION: Post-operative Care management   ADMITTING PHYSICIAN: Feliberto Carr MD      ASSESSMENT:  Tessa Mansfield is a 89 year old female with a history of cholecystectomy and hysterectomy, CAD s/p multiple stents, CKD, Afib with Apixaban, reversed in the OR with PCC, who was admitted on 4/13/2024 with abdominal pain and clinical signs of bowel obstruction with possible ischemia. She is S/p exploratory lap, found with hemorrhagic bowel without necrosis, small bowel torsion around adhesions between small bowel and transverse colon epiploa. S/p Emergent take back (4/13) due to hypotension and acute anemia      She was brought to the ICU with an open abdomen with a plan to RTOR on 4/14 for abdominal closure.       Overnight Events:  - Patient received transfusion of 1 unit of pRBC and 1 unit of platlets. Patient required slight increase in oxygen need after given 50mcg of fentanyl for pain. Patient has had about 25cc/hr of urine produced overnight.     Changes Today:  - Transfusion of 1 unit of platelets and 1 unit of pRBCs  - Restart PTA Lasix  - Metoprolol 50 BID   - Initiate clear liquid diet  - Remove arterial line  - CXR this AM  - Discontinue IV fluids   - NG tube removal      Neurological:  # Acute post-operative pain  - Monitor neurological status.  - Keep appropriately sedated for synchronicity and pain control  - Scheduled tylenol TID, robaxin PRN    Pulmonary:   # Post operative ventilatory support  # Acute hypoxic respiratory support   - Patient on 6LPM via oxymask. Will wean down today  - Supplemental oxygen to keep saturation above 92 %.  - Holding Budesonide PTA     Cardiovascular:    # History of Atrial Fibrillation on Apixaban  # History of CAD, sp multiple stents  # Shock-distributive   - EKG baseline performed 4/13, demonstrating Atrial-paced with PVCs  - Last echo  (10/9/2023), demonstrating EF of 55-60% with mild-moderate mitral insufficiency, with RVP 35 mmHg above RAP  - Monitor hemodynamic status  - PTA Amlodipine half dose (5mg)  - Holding PTA Apixaban  - Holding PTA Plavix  - PTA metoprolol 50 BID (half dose 25mg)  - Holding PTA Lasix 40 daily  - Restart PTA rosuvastatin   - Trend lactate 1.5 to 1.7. Down from 4.8 on 4/14.      Gastroenterology/Nutrition:  # s/p exploratory laparotomy x2   # Protein calorie deficit malnutrition   - Has NG tube  - Initiate CLD today  - Continue PPI today for Platelets <50.      Renal/Fluids/Electrolytes:   # History of CKD (baseline creatinine 1.7)  - /hr for IV fluid hydration and blood products  - Urine output is low, continue to monitor closely . Will continue to monitor intake and output.  - Holding PTA allopurinol  - ICU Electrolyte replacement protocol (Mag, Phos, K)  - Cr. 2.04  - +10.4 since admission  - Urine output: 515/505mL     Endocrine:  # At risk for stress hyperglycemia   - No management indication.   - Will consider Sliding scale for glucose management. Goal to keep BG< 180 for optimal wound healing   - Labs:      ID:  # Concern for bowel necrosis  # Leukocytosis   - Will continue Ceftriaxone/Flagyl  - Trend WC and fevers.   - WBC: 20.6 from 31.5. Tm: 98.7  - Fever work-up if indicated     Heme:     # Acute blood loss anemia   # Anemia of critical illness  - Transfuse if hgb <7.0 or signs/symptoms of hypoperfusion. Monitor and trend.   - Will send coags, CBC and CMP once in unit, trending and transfusing as indicated  - Received 3 U PRBCs and 1 U Platelets in OR (4/14), and 1 unit of platelets (4/14)  - 1 unit of pRBC and 1 unit of platelets on 4/15. Hgb: 6.9; Plts: 35. Recheck in afternoon     Musculoskeletal:  # Weakness and deconditioning of critical illness   - Physical and occupational therapy consult   - up to chair     Skin:  # At risk for pressure ulcers   - Appreciate excellent nursing cares  -  Frequent turning as indicated     General Cares/Prophylaxis:    DVT Prophylaxis: Pneumatic Compression Devices  GI Prophylaxis: PPI  Restraints: Restraints for medical healing needed: Yes. Utilizing soft restraints     Lines/ tubes/ drains:  - 2 PIV  - Bonner     Disposition:  - Surgical ICU    Patient seen and discussed with staff, Dr. Rooney.    Sarkis Jennings MD  SICU Intern Resident    ====================================      OBJECTIVE:     Temp:  [97  F (36.1  C)-98.7  F (37.1  C)] 97.9  F (36.6  C)  Pulse:  [] 88  Resp:  [10-16] 12  BP: (121)/(52) 121/52  MAP:  [63 mmHg-99 mmHg] 84 mmHg  Arterial Line BP: ()/(40-63) 130/53  FiO2 (%):  [40 %] 40 %  SpO2:  [86 %-100 %] 99 %  Vent Mode: CPAP/PS  (Continuous positive airway pressure with Pressure Support)  FiO2 (%): 40 %  Resp Rate (Set): 12 breaths/min  Tidal Volume (Set, mL): 450 mL  PEEP (cm H2O): 5 cmH2O  Pressure Support (cm H2O): 5 cmH2O  Resp: 12      I/O last 3 completed shifts:  In: 4191.59 [I.V.:3301.59; NG/GT:90]  Out: 1065 [Urine:515; Emesis/NG output:300; Drains:250]    Gen: A&O x4, NAD   Chest: breathing non-labored on nasal cannula at 6 liters per minute   CV: Regular rate and rhythm.   Abdomen: soft, non-tender, non-distended. Dressing intact with   Incision: clean, dry, intact closed with staples. Dressing with minimal strikethrough.  Extremities: warm and well perfused.  Devices: Nasal cannula, PIV LUE x 1. RUE PIV x 1.      LABS:   Arterial Blood Gases   Recent Labs   Lab 04/13/24  1435 04/13/24  1212   PH 7.36 7.31*   PCO2 39 35   PO2 113* 362*   HCO3 22 18*     Complete Blood Count   Recent Labs   Lab 04/15/24  0350 04/14/24  1732 04/14/24  0940 04/14/24 0428   WBC 20.6* 31.5* 24.9* 27.6*   HGB 6.9* 6.8* 7.9* 8.8*   PLT 35* 38* 43* 44*     Basic Metabolic Panel  Recent Labs   Lab 04/15/24  0352 04/15/24  0350 04/14/24  1732 04/14/24  0551 04/14/24  0428 04/13/24 2021 04/13/24  1431   NA  --  142 142  --  143  --  144  144    POTASSIUM  --  3.8 3.5  --  3.8  --  3.8  3.8   CHLORIDE  --  108* 109*  --  107  --  107  107   CO2  --  23 21*  --  22  --  20*  20*   BUN  --  29.3* 27.3*  --  24.6*  --  22.5  22.5   CR  --  2.04* 1.99*  --  1.96*  --  1.59*  1.59*   GLC 99 104* 118* 110* 120*   < > 124*  124*    < > = values in this interval not displayed.     Liver Function Tests  Recent Labs   Lab 04/14/24 0428 04/13/24  1431 04/13/24 0317 04/11/24  1519   AST  --  69* 29  --    ALT  --  30 13  --    ALKPHOS  --  44 84  --    BILITOTAL  --  0.3 0.4  --    ALBUMIN  --  2.9* 4.5 4.3   INR 1.59* 1.81*  --   --      Pancreatic Enzymes  Recent Labs   Lab 04/13/24 0317   LIPASE 69*     Coagulation Profile  Recent Labs   Lab 04/14/24 0428 04/13/24  1431   INR 1.59* 1.81*   PTT  --  42*         IMAGING:   Recent Results (from the past 24 hour(s))   XR Abdomen Port 1 View    Narrative    EXAM: XR ABDOMEN PORT 1 VIEW, 4/14/2024 12:22 PM    INDICATION: Closing. No missing item. Laparotomy exploratory.    COMPARISON: Abdominal radiograph 4/13/2024    FINDINGS:  Intraoperative film of the abdomen. Gastric tube tip and sidehole  projected over the stomach. Nonobstructive bowel gas pattern. No free  intraperitoneal air, pneumatosis or portal venous gas. IVC filter in  place. No radiopaque foreign body. Unchanged small rounded metallic  foci in the right pelvis. No acute or suspicious bone abnormalities.  Bilateral hip arthroplasties.      Impression    IMPRESSION:   No radiopaque foreign body identified.    Findings communicated with the OR 2 team by Dr. Lerma at 12:11 PM    I have personally reviewed the examination and initial interpretation  and I agree with the findings.    KATHERIN COSTELLO,          SYSTEM ID:  [id number]

## 2024-04-15 NOTE — PLAN OF CARE
A&Ox4. ARRIOLA's, weak, lethargic. Stable VS. Off pressors and sedation. Fentanyl 35qotv2 for abdominal pain. Bonner in place with adequate output. No BM but passing flatus. Skin intact with generalized bruising. Abdominal wound dressing intact.   -Will need RBC's and platelet transfusion this morning   Plan: Increase activity. Trending labs       Goal Outcome Evaluation:  Overall Patient Progress: improvingOverall Patient Progress: improving    Outcome Evaluation: Off pressors, 2-4LNC at night. Minimal pain, neuro's intact

## 2024-04-15 NOTE — PHARMACY-ADMISSION MEDICATION HISTORY
Pharmacist Admission Medication History    Admission medication history is complete. The information provided in this note is only as accurate as the sources available at the time of the update.    Information Source(s): Family member, Clinic records, Hospital records, and CareEverywhere/[facility] via in-person/Chart Review.    Pertinent Information:   Spoke with patient's  who was unable to confirm patient's medications. Current medication list confirmed using fill history in [facility] [employer], 4/11/2024 office visit note, 4/3/2024 office visit note, and 3/21/2024 discharge note. Exact last time taken for medications unknown. However, patient's  reports that the patient manages her own medications and does not miss doses so last doses would likely have been the evening of 4/12/2024.  All prescription medicines - except the Dulera and Plumicort - have recent fills in [facility]. Medications in current list are consistent with medication lists from 4/11 and 4/3 office visits.   Patient's 4/3 office visit was with pulmonologist. It was documented that the patient is not currently taking scheduled Plumicort or Dulera but has a Dulera inhaler on hand for use as needed.  Patient's 4/11 office visit was with family medicine. It was documented that she was to restart potassium chloride 10 mEq daily and increase amlodipine from 5 mg daily to 10 mg daily.  Discharge note from 3/21 stated to stop furosemide. Prior to 3/21, the patient was taking furosemide 40 mg twice daily. Per Chart Review, patient resumed furosemide 40 mg daily on 4/1 after reaching out to her nephrology clinic for approval.    Changes made to PTA medication list:  Added: None  Deleted:   Amlodipine (duplicate entry)  Changed:   Amlodipine 10 mg tablet - take 5 mg by mouth daily --> take 10 mg by mouth daily (dose increased at 4/11 office visit)    Allergies reviewed with patient and updates made in EHR: unable to assess    Medication History  Completed By: Hermes Vicente [facility] 4/15/2024 1:20 PM    PTA Med List   Medication Sig Last Dose    allopurinol (ZYLOPRIM) 100 MG tablet Take 1 tablet (100 mg) by mouth daily Unknown    amLODIPine (NORVASC) 10 MG tablet Take 1 tablet (10 mg) by mouth daily Unknown    apixaban ANTICOAGULANT (ELIQUIS) 2.5 MG tablet Take 1 tablet (2.5 mg) by mouth 2 times daily Unknown    budesonide (PULMICORT) 0.5 MG/2ML neb solution Take 2 mLs (0.5 mg) by nebulization 2 times daily Unknown    calcium carbonate 500 mg, elemental, (OSCAL 500) 1250 (500 Ca) MG TABS tablet Take 1 tablet by mouth daily Unknown    clopidogrel (PLAVIX) 75 MG tablet Take 1 tablet (75 mg) by mouth daily Unknown    cyanocolbalamin (VITAMIN B-12) 1000 MCG tablet Take 500 mcg by mouth daily As directed Unknown    FEROSUL 325 (65 Fe) MG tablet TAKE 1 TABLET BY MOUTH EVERY OTHER DAY Unknown    fluticasone-salmeterol (ADVAIR) 250-50 MCG/ACT inhaler INHALE 1 DOSE BY MOUTH TWICE DAILY Unknown    furosemide (LASIX) 40 MG tablet Take 1 tablet (40 mg) by mouth daily Unknown    isosorbide mononitrate (ISMO/MONOKET) 20 MG tablet Take 2 tablets (40 mg) by mouth 3 times daily Unknown    metoprolol tartrate (LOPRESSOR) 50 MG tablet Take 1 tablet (50 mg) by mouth 2 times daily Unknown    Multiple Vitamins-Minerals (PRESERVISION AREDS 2+MULTI VIT PO) Take by mouth 2 times daily Unknown    omeprazole (PRILOSEC) 40 MG DR capsule Take 1 capsule (40 mg) by mouth daily Unknown    potassium chloride ER (K-TAB/KLOR-CON) 10 MEQ CR tablet Take 1 tablet (10 mEq) by mouth daily Unknown    psyllium (METAMUCIL/KONSYL) Packet Take 1 packet by mouth daily Unknown    rosuvastatin (CRESTOR) 20 MG tablet Take 1 tablet (20 mg) by mouth daily for 360 days Unknown    timolol maleate (TIMOPTIC) 0.5 % ophthalmic solution INSTILL 1 DROP INTO EACH EYE ONCE DAILY IN THE MORNING Unknown    vitamin D3 25 mcg (1000 units) tablet Take 1 tablet (25 mcg) by mouth every other day Unknown

## 2024-04-15 NOTE — PROGRESS NOTES
"EMERGENCY GENERAL SURGERY PROGRESS NOTE     S: Patient state feel fine. Pain is tolerated. Deny flatus, nausea, vomit, and shortness of breath.     O:  /52   Pulse 85   Temp 98.2  F (36.8  C) (Oral)   Resp 16   Ht 1.676 m (5' 6\")   Wt 73.6 kg (162 lb 4.1 oz)   SpO2 95%   BMI 26.19 kg/m      Gen: lying in bed comfortably  Resp: non labored breathing at 6 LPM  CV: normotensive  Abd: soft, non distended and non tender. Midline wound dressing no strikethrough or saturation.  Ext: warm and well perfused     A/P: 89 years female is here for closed loop bowel obstruction and is now POD 1 s/p abdominal midline wound closure and POD 2 s/p Ex Lap. Afebrile. Low UOP 0.29 mL/kg/hr. WBC 20.6K from 31.5K, Hgb 6.9 from 6.8, PLT 35K from 38K. We recommend red blood cell. PT/OT. Remain NPO, NGT LIS and await return of bowel function. Patient is ok to transfer out off ICU to intermediate care unit. OK to start DVT PPX.      Seen with the Chief Resident Dr. Valentine, who will discuss with the Staff.    Matthew García, PGY 1  General Surgery Resident  Pager [phone]    "

## 2024-04-15 NOTE — PROGRESS NOTES
[facility]   Inpatient Clinical Swallow Evaluation    04/15/24 5561   Appointment Info   Signing Clinician's Name / Credentials (SLP) Sana Woodard MS CCC SLP   General Information   Onset of Illness/Injury or Date of Surgery 04/13/24   Referring Physician Brennen James MD   Patient/Family Therapy Goal Statement (SLP) None stated   Pertinent History of Current Problem Per provider documentation - Tessa Mansfield is a 89 year old female with a history of cholecystectomy and hysterectomy, CAD s/p multiple stents, CKD, Afib with Apixaban, reversed in the OR with PCC, who was admitted on 4/13/2024 with abdominal pain and clinical signs of bowel obstruction with possible ischemia. She is S/p exploratory lap, found with hemorrhagic bowel without necrosis, small bowel torsion around adhesions between small bowel and transverse colon epiploa. S/p Emergent take back (4/13) due to hypotension and acute anemia   General Observations Pt is agreeable to evaluation. No family present.   Type of Evaluation   Type of Evaluation Swallow Evaluation   Oral Motor   Oral Musculature generally intact   Structural Abnormalities none present   Mucosal Quality adequate   Oral Motor Deficits Observed   (Bruised lips and possibly tongue)   Dentition (Oral Motor)   Dentition (Oral Motor) adequate dentition   Facial Symmetry (Oral Motor)   Facial Symmetry (Oral Motor)   (slightly L side impairment, ? if baseline, adequate ROM)   Vocal Quality/Secretion Management (Oral Motor)   Vocal Quality (Oral Motor) WFL;hoarse  (mildly hoarse)   Secretion Management (Oral Motor) WNL  (had an episode this AM coughing large amounts of blood clots, doing better since)   General Swallowing Observations   Past History of Dysphagia No apparent hx based on chart review, and pt denies any difficulty   Respiratory Support high-flow  (HFNC 50% FiO2, 25 lpm)   Current Diet/Method of Nutritional Intake (General Swallowing Observations, NIS) NPO   Swallowing  Evaluation Clinical swallow evaluation   Clinical Swallow Evaluation   Feeding Assistance frequent cues/help required   Clinical Swallow Evaluation Textures Trialed thin liquids   Clinical Swallow Eval: Thin Liquid Texture Trial   Mode of Presentation, Thin Liquids cup;spoon;straw;fed by clinician;self-fed   Volume of Liquid or Food Presented 4 oz   Oral Phase of Swallow WFL   Pharyngeal Phase of Swallow intact   Diagnostic Statement No overt s/sx of aspiration. Intermittent stridor noted however overall pt was stable   Esophageal Phase of Swallow   Patient reports or presents with symptoms of esophageal dysphagia Yes   Esophageal comments Pt has documented GERD   Swallowing Recommendations   Diet Consistency Recommendations clear liquid diet  (per surgery recommendation)   Supervision Level for Intake 1:1 supervision needed   Mode of Delivery Recommendations bolus size, small;slow rate of intake   Monitoring/Assistance Required (Eating/Swallowing) stop eating activities when fatigue is present;monitor for cough or change in vocal quality with intake   Recommended Feeding/Eating Techniques (Swallow Eval) maintain upright sitting position for eating;maintain upright posture during/after eating for 30 minutes;minimize distractions during oral intake   Medication Administration Recommendations, Swallowing (SLP) As tolerated   Instrumental Assessment Recommendations instrumental evaluation not recommended at this time  (Will follow and determine if needed in the future)   General Therapy Interventions   Planned Therapy Interventions Dysphagia Treatment   Clinical Impression   Criteria for Skilled Therapeutic Interventions Met (SLP Eval) Yes, treatment indicated   SLP Diagnosis Oropharyngeal dysphagia   Risks & Benefits of therapy have been explained evaluation/treatment results reviewed;care plan/treatment goals reviewed;risks/benefits reviewed;current/potential barriers reviewed;participants voiced agreement with care  plan;participants included;patient   Clinical Impression Comments Pt seen for clinical swallow evaluation. Evaluation is limited to clears per surgery. Pt tolerated sips of clears by spoon, cup, and straw. No overt s/sx of aspiration across trials, RR and O2 sats remained stable. Intermittent stridor noted but pt remained stable.     Potential oropharyngeal dysphagia secondary to acute illness. Recommend clear liquids (per surgery) complete upright position, small sips, slow rate, and completely upright for any PO. Monitor for s/sx of aspriation closely. Initiate SLP services for dysphagia.   SLP Total Evaluation Time   Eval: oral/pharyngeal swallow function, clinical swallow Minutes ([id number]) 67

## 2024-04-15 NOTE — PROGRESS NOTES
Patient suctioned and electively extubated per physician order. Placed on 3 L NC breath sounds were clear, labs pending. Cuff leak head with stethoscope before extubation and no stridor present after extubation.Patient tolerated procedure well without any immediate complications. Pt has a weak cough.    Samantha Cervantes, RT, RT  4/14/2024 7:04 PM

## 2024-04-16 ENCOUNTER — APPOINTMENT (OUTPATIENT)
Dept: PHYSICAL THERAPY | Facility: CLINIC | Age: 89
DRG: 329 | End: 2024-04-16
Payer: COMMERCIAL

## 2024-04-16 ENCOUNTER — APPOINTMENT (OUTPATIENT)
Dept: GENERAL RADIOLOGY | Facility: CLINIC | Age: 89
DRG: 329 | End: 2024-04-16
Payer: COMMERCIAL

## 2024-04-16 ENCOUNTER — APPOINTMENT (OUTPATIENT)
Dept: OCCUPATIONAL THERAPY | Facility: CLINIC | Age: 89
DRG: 329 | End: 2024-04-16
Payer: COMMERCIAL

## 2024-04-16 ENCOUNTER — APPOINTMENT (OUTPATIENT)
Dept: SPEECH THERAPY | Facility: CLINIC | Age: 89
DRG: 329 | End: 2024-04-16
Payer: COMMERCIAL

## 2024-04-16 LAB
ANION GAP SERPL CALCULATED.3IONS-SCNC: 12 MMOL/L (ref 7–15)
ANION GAP SERPL CALCULATED.3IONS-SCNC: 15 MMOL/L (ref 7–15)
BUN SERPL-MCNC: 30.1 MG/DL (ref 8–23)
BUN SERPL-MCNC: 30.5 MG/DL (ref 8–23)
CALCIUM SERPL-MCNC: 8.4 MG/DL (ref 8.8–10.2)
CALCIUM SERPL-MCNC: 8.6 MG/DL (ref 8.8–10.2)
CHLORIDE SERPL-SCNC: 103 MMOL/L (ref 98–107)
CHLORIDE SERPL-SCNC: 107 MMOL/L (ref 98–107)
CREAT SERPL-MCNC: 1.88 MG/DL (ref 0.51–0.95)
CREAT SERPL-MCNC: 1.94 MG/DL (ref 0.51–0.95)
DEPRECATED HCO3 PLAS-SCNC: 27 MMOL/L (ref 22–29)
DEPRECATED HCO3 PLAS-SCNC: 27 MMOL/L (ref 22–29)
EGFRCR SERPLBLD CKD-EPI 2021: 24 ML/MIN/1.73M2
EGFRCR SERPLBLD CKD-EPI 2021: 25 ML/MIN/1.73M2
ERYTHROCYTE [DISTWIDTH] IN BLOOD BY AUTOMATED COUNT: 20.2 % (ref 10–15)
GLUCOSE BLDC GLUCOMTR-MCNC: 103 MG/DL (ref 70–99)
GLUCOSE BLDC GLUCOMTR-MCNC: 93 MG/DL (ref 70–99)
GLUCOSE SERPL-MCNC: 93 MG/DL (ref 70–99)
GLUCOSE SERPL-MCNC: 97 MG/DL (ref 70–99)
HCT VFR BLD AUTO: 27.5 % (ref 35–47)
HGB BLD-MCNC: 8.9 G/DL (ref 11.7–15.7)
MAGNESIUM SERPL-MCNC: 1.3 MG/DL (ref 1.7–2.3)
MAGNESIUM SERPL-MCNC: 2.2 MG/DL (ref 1.7–2.3)
MCH RBC QN AUTO: 27.6 PG (ref 26.5–33)
MCHC RBC AUTO-ENTMCNC: 32.4 G/DL (ref 31.5–36.5)
MCV RBC AUTO: 85 FL (ref 78–100)
PHOSPHATE SERPL-MCNC: 2.6 MG/DL (ref 2.5–4.5)
PHOSPHATE SERPL-MCNC: 5.9 MG/DL (ref 2.5–4.5)
PLATELET # BLD AUTO: 92 10E3/UL (ref 150–450)
POTASSIUM SERPL-SCNC: 3.6 MMOL/L (ref 3.4–5.3)
POTASSIUM SERPL-SCNC: 3.6 MMOL/L (ref 3.4–5.3)
RBC # BLD AUTO: 3.22 10E6/UL (ref 3.8–5.2)
SODIUM SERPL-SCNC: 145 MMOL/L (ref 135–145)
SODIUM SERPL-SCNC: 146 MMOL/L (ref 135–145)
WBC # BLD AUTO: 14 10E3/UL (ref 4–11)

## 2024-04-16 PROCEDURE — 97530 THERAPEUTIC ACTIVITIES: CPT | Mod: GO | Performed by: OCCUPATIONAL THERAPIST

## 2024-04-16 PROCEDURE — 250N000011 HC RX IP 250 OP 636

## 2024-04-16 PROCEDURE — 250N000011 HC RX IP 250 OP 636: Performed by: STUDENT IN AN ORGANIZED HEALTH CARE EDUCATION/TRAINING PROGRAM

## 2024-04-16 PROCEDURE — 83735 ASSAY OF MAGNESIUM: CPT

## 2024-04-16 PROCEDURE — 83735 ASSAY OF MAGNESIUM: CPT | Performed by: PHYSICIAN ASSISTANT

## 2024-04-16 PROCEDURE — 250N000011 HC RX IP 250 OP 636: Mod: JZ | Performed by: STUDENT IN AN ORGANIZED HEALTH CARE EDUCATION/TRAINING PROGRAM

## 2024-04-16 PROCEDURE — 80048 BASIC METABOLIC PNL TOTAL CA: CPT | Performed by: STUDENT IN AN ORGANIZED HEALTH CARE EDUCATION/TRAINING PROGRAM

## 2024-04-16 PROCEDURE — 84100 ASSAY OF PHOSPHORUS: CPT | Performed by: PHYSICIAN ASSISTANT

## 2024-04-16 PROCEDURE — 71045 X-RAY EXAM CHEST 1 VIEW: CPT | Mod: 26 | Performed by: RADIOLOGY

## 2024-04-16 PROCEDURE — 36415 COLL VENOUS BLD VENIPUNCTURE: CPT

## 2024-04-16 PROCEDURE — 250N000013 HC RX MED GY IP 250 OP 250 PS 637: Performed by: STUDENT IN AN ORGANIZED HEALTH CARE EDUCATION/TRAINING PROGRAM

## 2024-04-16 PROCEDURE — 84100 ASSAY OF PHOSPHORUS: CPT | Performed by: SURGERY

## 2024-04-16 PROCEDURE — 85027 COMPLETE CBC AUTOMATED: CPT | Performed by: STUDENT IN AN ORGANIZED HEALTH CARE EDUCATION/TRAINING PROGRAM

## 2024-04-16 PROCEDURE — 250N000013 HC RX MED GY IP 250 OP 250 PS 637

## 2024-04-16 PROCEDURE — 258N000003 HC RX IP 258 OP 636: Performed by: SURGERY

## 2024-04-16 PROCEDURE — 83735 ASSAY OF MAGNESIUM: CPT | Performed by: SURGERY

## 2024-04-16 PROCEDURE — 92526 ORAL FUNCTION THERAPY: CPT | Mod: GN

## 2024-04-16 PROCEDURE — 97530 THERAPEUTIC ACTIVITIES: CPT | Mod: GP

## 2024-04-16 PROCEDURE — 36415 COLL VENOUS BLD VENIPUNCTURE: CPT | Performed by: STUDENT IN AN ORGANIZED HEALTH CARE EDUCATION/TRAINING PROGRAM

## 2024-04-16 PROCEDURE — 80048 BASIC METABOLIC PNL TOTAL CA: CPT

## 2024-04-16 PROCEDURE — 999N000157 HC STATISTIC RCP TIME EA 10 MIN

## 2024-04-16 PROCEDURE — 36415 COLL VENOUS BLD VENIPUNCTURE: CPT | Performed by: PHYSICIAN ASSISTANT

## 2024-04-16 PROCEDURE — 99232 SBSQ HOSP IP/OBS MODERATE 35: CPT | Mod: 24 | Performed by: PHYSICIAN ASSISTANT

## 2024-04-16 PROCEDURE — 250N000011 HC RX IP 250 OP 636: Performed by: SURGERY

## 2024-04-16 PROCEDURE — C9113 INJ PANTOPRAZOLE SODIUM, VIA: HCPCS | Performed by: STUDENT IN AN ORGANIZED HEALTH CARE EDUCATION/TRAINING PROGRAM

## 2024-04-16 PROCEDURE — 71045 X-RAY EXAM CHEST 1 VIEW: CPT

## 2024-04-16 PROCEDURE — 120N000003 HC R&B IMCU UMMC

## 2024-04-16 PROCEDURE — 97161 PT EVAL LOW COMPLEX 20 MIN: CPT | Mod: GP

## 2024-04-16 PROCEDURE — 97167 OT EVAL HIGH COMPLEX 60 MIN: CPT | Mod: GO | Performed by: OCCUPATIONAL THERAPIST

## 2024-04-16 PROCEDURE — 250N000009 HC RX 250: Performed by: SURGERY

## 2024-04-16 RX ORDER — PANTOPRAZOLE SODIUM 40 MG/1
40 TABLET, DELAYED RELEASE ORAL
Status: DISCONTINUED | OUTPATIENT
Start: 2024-04-17 | End: 2024-04-18

## 2024-04-16 RX ORDER — MAGNESIUM SULFATE HEPTAHYDRATE 40 MG/ML
2 INJECTION, SOLUTION INTRAVENOUS ONCE
Status: COMPLETED | OUTPATIENT
Start: 2024-04-16 | End: 2024-04-16

## 2024-04-16 RX ORDER — POTASSIUM CHLORIDE 7.45 MG/ML
10 INJECTION INTRAVENOUS ONCE
Status: COMPLETED | OUTPATIENT
Start: 2024-04-16 | End: 2024-04-16

## 2024-04-16 RX ORDER — HEPARIN SODIUM 5000 [USP'U]/.5ML
5000 INJECTION, SOLUTION INTRAVENOUS; SUBCUTANEOUS EVERY 8 HOURS
Status: DISCONTINUED | OUTPATIENT
Start: 2024-04-16 | End: 2024-04-20 | Stop reason: ALTCHOICE

## 2024-04-16 RX ORDER — HYDROMORPHONE HYDROCHLORIDE 2 MG/1
2 TABLET ORAL
Status: DISCONTINUED | OUTPATIENT
Start: 2024-04-16 | End: 2024-04-22 | Stop reason: HOSPADM

## 2024-04-16 RX ADMIN — ACETAMINOPHEN 975 MG: 325 TABLET, FILM COATED ORAL at 21:50

## 2024-04-16 RX ADMIN — HYDROMORPHONE HYDROCHLORIDE 0.3 MG: 0.2 INJECTION, SOLUTION INTRAMUSCULAR; INTRAVENOUS; SUBCUTANEOUS at 02:13

## 2024-04-16 RX ADMIN — PANTOPRAZOLE SODIUM 40 MG: 40 INJECTION, POWDER, FOR SOLUTION INTRAVENOUS at 08:13

## 2024-04-16 RX ADMIN — POTASSIUM CHLORIDE 10 MEQ: 7.46 INJECTION, SOLUTION INTRAVENOUS at 07:00

## 2024-04-16 RX ADMIN — LABETALOL HYDROCHLORIDE 10 MG: 5 INJECTION, SOLUTION INTRAVENOUS at 14:08

## 2024-04-16 RX ADMIN — METRONIDAZOLE 500 MG: 500 INJECTION, SOLUTION INTRAVENOUS at 19:59

## 2024-04-16 RX ADMIN — FUROSEMIDE 20 MG: 10 INJECTION, SOLUTION INTRAMUSCULAR; INTRAVENOUS at 02:01

## 2024-04-16 RX ADMIN — FUROSEMIDE 20 MG: 10 INJECTION, SOLUTION INTRAMUSCULAR; INTRAVENOUS at 08:13

## 2024-04-16 RX ADMIN — POTASSIUM CHLORIDE 10 MEQ: 7.46 INJECTION, SOLUTION INTRAVENOUS at 21:50

## 2024-04-16 RX ADMIN — HYDROMORPHONE HYDROCHLORIDE 2 MG: 2 TABLET ORAL at 14:54

## 2024-04-16 RX ADMIN — ACETAMINOPHEN 975 MG: 325 TABLET, FILM COATED ORAL at 14:48

## 2024-04-16 RX ADMIN — HYDRALAZINE HYDROCHLORIDE 10 MG: 20 INJECTION INTRAMUSCULAR; INTRAVENOUS at 05:52

## 2024-04-16 RX ADMIN — POTASSIUM PHOSPHATE, MONOBASIC POTASSIUM PHOSPHATE, DIBASIC 9 MMOL: 224; 236 INJECTION, SOLUTION, CONCENTRATE INTRAVENOUS at 08:14

## 2024-04-16 RX ADMIN — LABETALOL HYDROCHLORIDE 10 MG: 5 INJECTION, SOLUTION INTRAVENOUS at 05:25

## 2024-04-16 RX ADMIN — HYDROMORPHONE HYDROCHLORIDE 0.3 MG: 0.2 INJECTION, SOLUTION INTRAMUSCULAR; INTRAVENOUS; SUBCUTANEOUS at 06:35

## 2024-04-16 RX ADMIN — TIMOLOL MALEATE 1 DROP: 5 SOLUTION/ DROPS OPHTHALMIC at 08:14

## 2024-04-16 RX ADMIN — HYDROMORPHONE HYDROCHLORIDE 0.5 MG: 0.2 INJECTION, SOLUTION INTRAMUSCULAR; INTRAVENOUS; SUBCUTANEOUS at 10:42

## 2024-04-16 RX ADMIN — METOPROLOL TARTRATE 50 MG: 50 TABLET, FILM COATED ORAL at 19:59

## 2024-04-16 RX ADMIN — METRONIDAZOLE 500 MG: 500 INJECTION, SOLUTION INTRAVENOUS at 13:08

## 2024-04-16 RX ADMIN — METRONIDAZOLE 500 MG: 500 INJECTION, SOLUTION INTRAVENOUS at 04:26

## 2024-04-16 RX ADMIN — LIDOCAINE 4% 1 PATCH: 40 PATCH TOPICAL at 08:13

## 2024-04-16 RX ADMIN — LABETALOL HYDROCHLORIDE 10 MG: 5 INJECTION, SOLUTION INTRAVENOUS at 00:44

## 2024-04-16 RX ADMIN — MAGNESIUM SULFATE HEPTAHYDRATE 2 G: 2 INJECTION, SOLUTION INTRAVENOUS at 19:58

## 2024-04-16 RX ADMIN — CEFTRIAXONE SODIUM 2 G: 2 INJECTION, POWDER, FOR SOLUTION INTRAMUSCULAR; INTRAVENOUS at 14:48

## 2024-04-16 RX ADMIN — ROSUVASTATIN CALCIUM 10 MG: 10 TABLET, FILM COATED ORAL at 10:07

## 2024-04-16 RX ADMIN — AMLODIPINE BESYLATE 5 MG: 5 TABLET ORAL at 10:07

## 2024-04-16 RX ADMIN — METOPROLOL TARTRATE 50 MG: 50 TABLET, FILM COATED ORAL at 10:07

## 2024-04-16 ASSESSMENT — ACTIVITIES OF DAILY LIVING (ADL)
ADLS_ACUITY_SCORE: 55
ADLS_ACUITY_SCORE: 54
ADLS_ACUITY_SCORE: 50
ADLS_ACUITY_SCORE: 55
ADLS_ACUITY_SCORE: 50
ADLS_ACUITY_SCORE: 54
ADLS_ACUITY_SCORE: 50
ADLS_ACUITY_SCORE: 55
ADLS_ACUITY_SCORE: 50
ADLS_ACUITY_SCORE: 55
ADLS_ACUITY_SCORE: 54
ADLS_ACUITY_SCORE: 55

## 2024-04-16 NOTE — PROGRESS NOTES
04/16/24 1406   Appointment Info   Signing Clinician's Name / Credentials (OT) Ziggy Barnes, JOSER/L   Living Environment   People in Home spouse   Current Living Arrangements house   Transportation Anticipated family or friend will provide;car, drives self   Living Environment Comments Pt questionable historian, but reports no stairs to enter and a half flight up to bedroom and walk-in shower   Self-Care   Usual Activity Tolerance moderate   Current Activity Tolerance fair   Regular Exercise No   Equipment Currently Used at Home   (4WW, cane, high toilet, grab bars (tub/toilet), shower chair)   Fall history within last six months no   Activity/Exercise/Self-Care Comment Pt is questionable historian, but reports she wa\s I in all ADL/IADLs prior to admission, including cleaning, cooking, and medication management. Reports  does most of the driving.   General Information   Referring Physician Brennen James MD   Patient/Family Therapy Goal Statement (OT) Return to PLOF   Additional Occupational Profile Info/Pertinent History of Current Problem 89 years female is here for closed loop bowel obstruction and is now POD 1 s/p abdominal midline wound closure and POD 2 s/p Ex Lap   Existing Precautions/Restrictions fall;abdominal   Cognitive Status Examination   Cognitive Status Comments Impaired STM and LTM on eval, and groggy, will assess further   Visual Perception   Visual Acuity Wears bifocals   Activities of Daily Living   BADL Assessment/Intervention bathing;lower body dressing;grooming;toileting   Bathing Assessment/Intervention   [place] Level (Bathing) maximum assist (25% patient effort)   Comment, (Bathing) per clinical judgement   Lower Body Dressing Assessment/Training   Comment, (Lower Body Dressing) per clinical judgement   [place] Level (Lower Body Dressing) maximum assist (25% patient effort)   Grooming Assessment/Training   [place] Level (Grooming) moderate assist (50% patient effort)    Comment, (Grooming) per clinical judgement   Toileting   Comment, (Toileting) per clinical judgement   [place] Level (Toileting) maximum assist (25% patient effort)   Clinical Impression   Criteria for Skilled Therapeutic Interventions Met (OT) Yes, treatment indicated   OT Diagnosis Decreased I in ADLs due to deconditioning   Assessment of Occupational Performance 5 or more Performance Deficits   Identified Performance Deficits dressing, bathing, toileting, g/h, functional endurance, cognition   Clinical Decision Making Complexity (OT) detailed assessment/moderate complexity   Risk & Benefits of therapy have been explained patient   OT Total Evaluation Time   OT Eval, High Complexity Minutes (76528) 10

## 2024-04-16 NOTE — PLAN OF CARE
ICU End of Shift Summary. See flowsheets for vital signs and detailed assessment. Handoff report given to oncoming RN.     Major Events: Alert/intermittently lethargic, oriented x 4. Neurologically intact. SR, SBP goal maintained with labetolol x 1. Afebrile. HFNC 25L 40%. LS clear, weak cough. Full liquid diet, poor appetite. No BM this shift. Bonner remains in place, lasix given x 1, good response. Up in the chair x 2, worked with therapies.       Plan: Transfer to  when bed available. Continue with plan of care and notify team with changes.

## 2024-04-16 NOTE — PROGRESS NOTES
Care Management Short Note       RNCC attempted to meet with the patient for CMA. Patient is asleep.     Left  voice message for patient's  [phone].     Maia Cade, MSN, MA, CCM, RN  4C/4A/4E ICU Care Coordinator  Phone:[phone]  Pager: [phone]    For Weekend & Holiday on call RN Care Coordinator:  [employer] ([phone]) Saturday & Sunday; ([phone])  Recognized Holidays   Weekend 4C/4A/4E ICUs /6A Care Coordinator  Pager: [phone]

## 2024-04-16 NOTE — PROGRESS NOTES
"   04/16/24 0900   Appointment Info   Signing Clinician's Name / Credentials (PT) SHILPI Kim   Student Supervision Direct Patient Contact Provided   Rehab Comments (PT) abdominal precautions   Living Environment   People in Home spouse   Current Living Arrangements house   Home Accessibility stairs to enter home;stairs within home   Number of Stairs, Main Entrance 2   Stair Railings, Main Entrance none   Number of Stairs, Within Home, Primary seven   Stair Railings, Within Home, Primary railings safe and in good condition;railings on both sides of stairs   Transportation Anticipated family or friend will provide   Living Environment Comments Pt reports spouse around 24/7 for assistance, requires stairs to access bedroom and bathroom.   Self-Care   Usual Activity Tolerance moderate   Current Activity Tolerance fair   Regular Exercise No   Equipment Currently Used at Home cane, straight;walker, rolling;grab bar, toilet;grab bar, tub/shower;shower chair   Fall history within last six months yes   Number of times patient has fallen within last six months 1   Activity/Exercise/Self-Care Comment Reports IND with ADLs, utilizes 4WW for majority of mobility,   General Information   Onset of Illness/Injury or Date of Surgery 04/13/24   Referring Physician Brennen James MD   Patient/Family Therapy Goals Statement (PT) none reported   Pertinent History of Current Problem (include personal factors and/or comorbidities that impact the POC) per chart review, \"Tessa Mansfield is a 89 year old female with a history of cholecystectomy and hysterectomy, CAD s/p multiple stents, CKD, Afib with Apixaban, reversed in the OR with PCC, who was admitted on 4/13/2024 with abdominal pain and clinical signs of bowel obstruction with possible ischemia. She is S/p exploratory lap, found with hemorrhagic bowel without necrosis, small bowel torsion around adhesions between small bowel and transverse colon epiploa. S/p Emergent take back " "(4/13) due to hypotension and acute anemia      She was brought to the ICU with an open abdomen with a plan to RTOR on 4/14 for abdominal closure.      \"   Existing Precautions/Restrictions fall;abdominal   Heart Disease Risk Factors Medical history;Age;Lack of physical activity   Cognition   Affect/Mental Status (Cognition) WFL   Orientation Status (Cognition) oriented to;person;place;situation  (unable to state the date, but knows the year)   Follows Commands (Cognition) over 90% accuracy   Pain Assessment   Patient Currently in Pain   (pain from incision)   Integumentary/Edema   Integumentary/Edema no deficits were identifed   Posture    Posture Forward head position;Protracted shoulders;Kyphosis   Range of Motion (ROM)   Range of Motion ROM is WFL   Strength (Manual Muscle Testing)   Strength (Manual Muscle Testing) Deficits observed during functional mobility   Strength Comments generalized weakness and deconditioning; able to move extremities against gravity   Bed Mobility   Comment, (Bed Mobility) initiated log rolling with cues for LE and UE placement, Erick for log rolling to R side   Transfers   Comment, (Transfers) STS with Erick and FWW   Gait/Stairs (Locomotion)   Comment, (Gait/Stairs) impaired per clinical reasoning   Balance   Balance Comments CGA-SBA for static sitting balance at EOB, impaired dynamic balance   Clinical Impression   Criteria for Skilled Therapeutic Intervention Yes, treatment indicated   PT Diagnosis (PT) impaired functional mobility   Influenced by the following impairments pain, decreased endurance/strength, balance   Functional limitations due to impairments ambulation, stairs, transfers, bed mobility   Clinical Presentation (PT Evaluation Complexity) stable   Clinical Presentation Rationale per clinical reasoning   Clinical Decision Making (Complexity) low complexity   Planned Therapy Interventions (PT) balance training;bed mobility training;gait training;home exercise " program;neuromuscular re-education;patient/family education;stair training;strengthening;transfer training;progressive activity/exercise;risk factor education;home program guidelines   Risk & Benefits of therapy have been explained evaluation/treatment results reviewed;care plan/treatment goals reviewed;risks/benefits reviewed;patient   PT Total Evaluation Time   PT Eval, Low Complexity Minutes (32276) 8   Physical Therapy Goals   PT Frequency 5x/week   PT Predicted Duration/Target Date for Goal Attainment 04/30/24   PT Goals Bed Mobility;Transfers;Gait;Stairs   PT: Bed Mobility Independent;Within precautions;Supine to/from sit   PT: Transfers Modified independent;Assistive device;Sit to/from stand  (FWW/4WW)   PT: Gait Modified independent;Assistive device;Rolling walker;Greater than 200 feet   PT: Stairs 7 stairs;Rail on both sides;Supervision/stand-by assist   PT Discharge Planning   PT Plan amb when able (FWW), transfer/bed mobility, stairs when able, dynamic balance   PT Discharge Recommendation (DC Rec) Transitional Care Facility   PT Rationale for DC Rec Pt mobilizing below functional baseline and is limited by pain from recent surgery with current O2 needs via HFNC. Pt would benefit from increased therapy to improve functional independence and safety with mobility prior to discharge home.   PT Brief overview of current status up with 1 and FWW   Total Session Time   Timed Code Treatment Minutes 32   Total Session Time (sum of timed and untimed services) 40

## 2024-04-16 NOTE — PROGRESS NOTES
[facility]    Progress Note - Emergency General Surgery Service       Date of Admission:  4/13/2024  Interval History   NAEON. Patient is feeling pain but controlled with her current medications.     Physical Exam   Vital Signs: Temp: 98.1  F (36.7  C) Temp src: Axillary BP: (!) 164/62 Pulse: 80   Resp: 14 SpO2: 91 % O2 Device: High Flow Nasal Cannula (HFNC) Oxygen Delivery: 25 LPM  Weight: 164 lbs 7.41 oz              Intake/Output Summary (Last 24 hours) at 4/16/2024 1205  Last data filed at 4/16/2024 1200  Gross per 24 hour   Intake 1575 ml   Output 6550 ml   Net -4975 ml   Drains: None           Assessment & Plan: Surgery     A/P: 89 years female is here for closed loop bowel obstruction and is now POD 1 s/p abdominal midline wound closure and POD 2 s/p Ex Lap. Extubated on 4/14. Progressing well. On HFNC, no evidence of ROBF.     - NPO  - Ok for DVT ppx  - Abx for total of 4 days  - continue current pain control regimen  - Appreciate ICU cares.       Data     I have personally reviewed the following data over the past 24 hrs:    14.0 (H)  \   8.9 (L)   / 92 (L)     146 (H) 107 30.1 (H) /  93   3.6 27 1.94 (H) \       Imaging results reviewed over the past 24 hrs:   Recent Results (from the past 24 hour(s))   XR Chest Port 1 View    Narrative    Exam: XR CHEST PORT 1 VIEW, 4/15/2024 7:14 PM    Comparison: Same day radiograph    History: Increased airway secretions    Findings:  Portable AP view of the chest. Stable left chest wall pacemaker.  Stable cardiac silhouette. Slight improved aeration of the bilateral  mid to lower lung zones. Unchanged small bilateral pleural effusions.  No pneumothorax. Bibasilar hazy opacities. Osseous structures are  unchanged.      Impression    Impression: Slight improved aeration of the bilateral mid to lower  lung zones. Otherwise, no significant change in the small bilateral  effusions with overlying hazy opacities, greater on  the right.    I have personally reviewed the examination and initial interpretation  and I agree with the findings.    ERUM MAR MD         SYSTEM ID:  [id number]   XR Chest Port 1 View    Narrative    Exam: XR CHEST PORT 1 VIEW, 4/16/2024 1:20 AM    Indication: assess for pleural effusion    Comparison: Chest x-ray of 4/15/2024    Findings: AP portable chest radiograph at 40 degrees. Left chest  cardiac pacer with leads projected over the right atrium and right  ventricle. Coronary artery stent. Trachea is midline. Aortic arch  calcifications. Cardiac silhouette is unchanged. Bilateral pleural  effusions. No appreciable pneumothorax. Bilateral perihilar and  bibasilar interstitial opacities.    Osseous structures are unchanged.      Impression    Impression:   1. Unchanged, right greater than left, pleural effusions.  2. Bilateral perihilar and bibasilar interstitial opacities favoring  atelectasis versus edema.    I have personally reviewed the examination and initial interpretation  and I agree with the findings.    ERUM MAR MD         SYSTEM ID:  [id number]     Procedure(s):  Abdominal Re-Exploration and Closure on * No dates entered *  Clinically Significant Risk Factors         # Hypernatremia: Highest Na = 146 mmol/L in last 2 days, will monitor as appropriate      # Hypoalbuminemia: Lowest albumin = 2.9 g/dL at 4/13/2024  2:31 PM, will monitor as appropriate  # Coagulation Defect: INR = 1.59 (Ref range: 0.85 - 1.15) and/or PTT = 42 Seconds (Ref range: 22 - 38 Seconds), will monitor for bleeding  # Thrombocytopenia: Lowest platelets = 35 in last 2 days, will monitor for bleeding   # Hypertension: Noted on problem list  # Acute heart failure with preserved ejection fraction: heart failure noted on problem list, last echo with EF >50%, and receiving IV diuretics       # Overweight: Estimated body mass index is 26.55 kg/m  as calculated from the following:    Height as of this encounter: 1.676 m (5'  "6\").    Weight as of this encounter: 74.6 kg (164 lb 7.4 oz)., PRESENT ON ADMISSION     # Financial/Environmental Concerns:     # Pacemaker present       Disposition Plan          The patient's care was discussed with the Chief Resident/Fellow.       Ashu Robledo MD  [facility]  All communications related to this note should be expressed to resident/NAPOLEON on call for this team at the time of your communication.   "

## 2024-04-16 NOTE — PROGRESS NOTES
SURGICAL ICU PROGRESS NOTE  04/16/2024    Date of Service (when I saw the patient): 04/16/2024    ASSESSMENT:   Tessa Mansfield is a 89 year old female with a history of cholecystectomy and hysterectomy, CAD s/p multiple stents, CKD, Afib with Apixaban, reversed in the OR with PCC, who was admitted on 4/13/2024 with abdominal pain and clinical signs of bowel obstruction and concern for possible ischemia.  OR 4/13, S/p exploratory lap, found with hemorrhagic bowel without necrosis, small bowel torsion around adhesions between small bowel and transverse colon epiploa. S/p Emergent take back ( later in 4/13) due to hypotension and acute anemia, hgb 5.0.  found to have hemoperitoneum with approximant 1.5L  hematoma and found to have oozing from inferiors aspect of transerve mesocolon,  repaired, resuscitated and  left with  open abdomen, RTOR on 4/14 for abdominal closure. Extubated post procedure.     CHANGES and MAJOR THINGS TODAY:   Diet advancement   PO pain medications   Start DVT prophylaxis, hold Apixiban and plavix for now   Hold additional diuresis today   Continue with bronchial hygiene: IS/acapella/coughing and OOB   Abdominal binder for comfort when OOB.       PLAN:  Neurological:  # Acute post-operative pain  - Scheduled tylenol TID, robaxin PRN  - prn IVP dilaudid and po dilaudid for pain      Pulmonary:   # Post operative ventilatory support, extubated post procedure   - HFNC, continue with now   - Supplemental oxygen to keep saturation above 92 %.  - Holding Budesonide PTA--pt not using per Pharm D med rec 4/15/24      Cardiovascular:    # History of Atrial Fibrillation on Apixaban  # History of CAD, sp multiple stents  # Shock-distributive, resumed   - EKG baseline performed 4/13, demonstrating Atrial-paced with PVCs  - Last echo (10/9/2023), demonstrating EF of 55-60% with mild-moderate mitral insufficiency, with RVP 35 mmHg above RAP  - Holding PTA Apixaban and  Plavix  - hold isosorbide for now   -  Resume rosuvastatin   - Resume Metoprolol 50 mg BID   - Amlodipine 5 mg daily      Gastroenterology/Nutrition:  # s/p exploratory laparotomy x2   # Concern for Protein calorie deficit malnutrition   - CLD started 4/15, SLP following, okay for full liquids   - PPI      Renal/Fluids/Electrolytes:   #  CKD (baseline creatinine 1.7)  . Will continue to monitor intake and output.  - Holding PTA allopurinol  - High Electrolyte replacement protocol (Mag, Phos, K)     Endocrine:  # concern for  stress hyperglycemia   - consider Sliding scale for glucose management if BG's elevated.   - Goal to keep BG< 180 for optimal wound healing      ID:  # Leukocytosis   - Will continue Ceftriaxone/Flagyl--> confer with surgery duration of therapy   - monitor for signs/symptoms of infection  - WBC 14.0 (16k)     Heme:     # Acute blood loss anemia due to surgical blood loss    # coagulopathy due to apixiban and plavix use   - Received 3 U PRBCs and 1 U Platelets in OR (4/14), and 1 unit of platelets (4/14)  - 1 unit of pRBC and 1 unit of platelets on 4/15.   - hgb 8.9  - continue to hold apixiban and plavix,      Musculoskeletal:  # Weakness and deconditioning of critical illness   - Physical and occupational therapy consult   - up to chair     Skin:  # At risk for pressure ulcers   - Appreciate excellent nursing cares  - Frequent turning as indicated     General Cares/Prophylaxis:    DVT Prophylaxis: Pneumatic Compression Devices  GI Prophylaxis: PPI  Restraints: none      Lines/ tubes/ drains:  - 2 PIV  - Bonner     Disposition:  - Surgical ICU     Time spent on this Encounter   Billing:  I spent 49 minutes bedside and on the inpatient unit today managing the care of Tessa Mansfield in relation to the issues listed in this note.      Anshu Yeung PA-C    ====================================  INTERVAL HISTORY:  Course reviewed. Desaturation noted with suctioning overnight. Bloody secretions overnight per nursing, none this am. Denies  epistaxis or oral bleeding. Pt reports abdominal pain. Shrugs when asked how she felt. Denies chest pain, fever, or chills. No flatus per patient. Denied nausea or emesis.     OBJECTIVE:   1. VITAL SIGNS:   Temp:  [97.6  F (36.4  C)-99.3  F (37.4  C)] 98.6  F (37  C)  Pulse:  [66-91] 75  Resp:  [10-18] 16  BP: (141-181)/(55-85) 154/64  MAP:  [77 mmHg-293 mmHg] 103 mmHg  Arterial Line BP: (117-166)/(51-68) 166/62  FiO2 (%):  [40 %-100 %] 60 %  SpO2:  [88 %-100 %] 94 %  FiO2 (%): 60 %  Resp: 16    2. INTAKE/ OUTPUT:   I/O last 3 completed shifts:  In: 3354.67 [I.V.:1761.67; NG/GT:95]  Out: 3705 [Urine:3705]    3. PHYSICAL EXAMINATION:  General: laying in bed, interacts with examiners, answers questions briefly.   HEENT: PERRLA. HFNC. OP with dried oral secretions, no active bleeding noted. Ecchymosis along left lower lip. No active ooze in oropharynx.   Neuro: arouses to answer questions, ARRIOLA.   Pulm/Resp: BBS, lung coarse, no wheezes or rhonchi, decreased at bases. HFNC present.   CV: RRR, S1/S2   Abdomen: incision intact, staples present, TTP along incision line  : hameed present and secured, urine yellow and clear.   Incisions/Skin: scattered ecchymosis in extremities   MSK/Extremities: ARRIOLA. Calves soft and compressible. Pulses 2+.     4. INVESTIGATIONS:   Arterial Blood Gases   Recent Labs   Lab 04/15/24  1051 04/13/24  1435 04/13/24  1212   PH 7.33* 7.36 7.31*   PCO2 50* 39 35   PO2 51* 113* 362*   HCO3 26 22 18*     Complete Blood Count   Recent Labs   Lab 04/16/24  0548 04/15/24  1930 04/15/24  1501 04/15/24  0350   WBC 14.0* 16.3* 15.2* 20.6*   HGB 8.9* 8.3* 8.1* 6.9*   PLT 92* 118* 37* 35*     Basic Metabolic Panel  Recent Labs   Lab 04/16/24  0548 04/16/24  0449 04/15/24  1930 04/15/24  1135 04/15/24  0352 04/15/24  0350 04/14/24  1732 04/14/24  0551 04/14/24  0428   *  --   --   --   --  142 142  --  143   POTASSIUM 3.6  --  3.6  --   --  3.8 3.5  --  3.8   CHLORIDE 107  --   --   --   --  108* 109*   --  107   CO2 27  --   --   --   --  23 21*  --  22   BUN 30.1*  --   --   --   --  29.3* 27.3*  --  24.6*   CR 1.94*  --   --   --   --  2.04* 1.99*  --  1.96*   GLC 93 103*  --  106* 99 104* 118*   < > 120*    < > = values in this interval not displayed.     Liver Function Tests  Recent Labs   Lab 04/14/24 0428 04/13/24  1431 04/13/24 0317 04/11/24  1519   AST  --  69* 29  --    ALT  --  30 13  --    ALKPHOS  --  44 84  --    BILITOTAL  --  0.3 0.4  --    ALBUMIN  --  2.9* 4.5 4.3   INR 1.59* 1.81*  --   --      Pancreatic Enzymes  Recent Labs   Lab 04/13/24 0317   LIPASE 69*     Coagulation Profile  Recent Labs   Lab 04/14/24 0428 04/13/24  1431   INR 1.59* 1.81*   PTT  --  42*         5. RADIOLOGY:   Recent Results (from the past 24 hour(s))   XR Chest Port 1 View    Narrative    Portable chest    INDICATION: Possible aspiration    COMPARISON: 4/13/2024    FINDINGS: Heart is normal size. Patchy densities in the lower lungs  indication worsening edema or possibly other pneumonitis comment  potentially aspiration if clinically applicable. Left subclavian  transvenous approach dual lead pacemaker again present. No ectopic  air. Contralateral blunting on the left increased.      Impression    IMPRESSION: Increasing left pleural effusion and possible edema and/or  atelectasis. Cannot exclude aspiration pneumonitis if clinically  applicable. Pacemaker.    HORACIO VASQUEZ MD         SYSTEM ID:  [id number]       =========================================

## 2024-04-16 NOTE — PLAN OF CARE
Neuro: patient lethargic. Oriented x4. Follows all commands.  CV: NSR with occasional PVCs. SBP <160 - PRNs given, see MAR. Afebrile. +2 radial pulses, +1 pedal pulses.   Resp: HFNC 60% 25LPM, dasaturated to 70's with oral suctioning. Clear/diminished lungs.  GI: NPO. BM 4/12.   : hameed. Patient received lasix 20mg x2 HS.     Plan: continue plan of care, encourage pulmonary hygiene, hemodynamic monitoring  For vital signs and complete assessments, please see documentation flowsheets.

## 2024-04-17 ENCOUNTER — APPOINTMENT (OUTPATIENT)
Dept: PHYSICAL THERAPY | Facility: CLINIC | Age: 89
DRG: 329 | End: 2024-04-17
Payer: COMMERCIAL

## 2024-04-17 ENCOUNTER — APPOINTMENT (OUTPATIENT)
Dept: SPEECH THERAPY | Facility: CLINIC | Age: 89
DRG: 329 | End: 2024-04-17
Payer: COMMERCIAL

## 2024-04-17 LAB
ANION GAP SERPL CALCULATED.3IONS-SCNC: 13 MMOL/L (ref 7–15)
BUN SERPL-MCNC: 28.1 MG/DL (ref 8–23)
CALCIUM SERPL-MCNC: 8.4 MG/DL (ref 8.8–10.2)
CHLORIDE SERPL-SCNC: 102 MMOL/L (ref 98–107)
CREAT SERPL-MCNC: 1.71 MG/DL (ref 0.51–0.95)
DEPRECATED HCO3 PLAS-SCNC: 27 MMOL/L (ref 22–29)
EGFRCR SERPLBLD CKD-EPI 2021: 29 ML/MIN/1.73M2
ERYTHROCYTE [DISTWIDTH] IN BLOOD BY AUTOMATED COUNT: 19.4 % (ref 10–15)
GLUCOSE BLDC GLUCOMTR-MCNC: 124 MG/DL (ref 70–99)
GLUCOSE BLDC GLUCOMTR-MCNC: 134 MG/DL (ref 70–99)
GLUCOSE BLDC GLUCOMTR-MCNC: 203 MG/DL (ref 70–99)
GLUCOSE BLDC GLUCOMTR-MCNC: 90 MG/DL (ref 70–99)
GLUCOSE SERPL-MCNC: 93 MG/DL (ref 70–99)
HCT VFR BLD AUTO: 29.4 % (ref 35–47)
HGB BLD-MCNC: 9.2 G/DL (ref 11.7–15.7)
MAGNESIUM SERPL-MCNC: 2.5 MG/DL (ref 1.7–2.3)
MAGNESIUM SERPL-MCNC: 2.8 MG/DL (ref 1.7–2.3)
MCH RBC QN AUTO: 27 PG (ref 26.5–33)
MCHC RBC AUTO-ENTMCNC: 31.3 G/DL (ref 31.5–36.5)
MCV RBC AUTO: 86 FL (ref 78–100)
PHOSPHATE SERPL-MCNC: 2.4 MG/DL (ref 2.5–4.5)
PLATELET # BLD AUTO: 103 10E3/UL (ref 150–450)
POTASSIUM SERPL-SCNC: 3.4 MMOL/L (ref 3.4–5.3)
POTASSIUM SERPL-SCNC: 3.8 MMOL/L (ref 3.4–5.3)
RBC # BLD AUTO: 3.41 10E6/UL (ref 3.8–5.2)
SODIUM SERPL-SCNC: 142 MMOL/L (ref 135–145)
WBC # BLD AUTO: 11 10E3/UL (ref 4–11)

## 2024-04-17 PROCEDURE — 97530 THERAPEUTIC ACTIVITIES: CPT | Mod: GP

## 2024-04-17 PROCEDURE — 250N000013 HC RX MED GY IP 250 OP 250 PS 637

## 2024-04-17 PROCEDURE — 84132 ASSAY OF SERUM POTASSIUM: CPT | Performed by: SURGERY

## 2024-04-17 PROCEDURE — 999N000157 HC STATISTIC RCP TIME EA 10 MIN

## 2024-04-17 PROCEDURE — 120N000003 HC R&B IMCU UMMC

## 2024-04-17 PROCEDURE — 258N000003 HC RX IP 258 OP 636

## 2024-04-17 PROCEDURE — 250N000011 HC RX IP 250 OP 636: Performed by: STUDENT IN AN ORGANIZED HEALTH CARE EDUCATION/TRAINING PROGRAM

## 2024-04-17 PROCEDURE — 92526 ORAL FUNCTION THERAPY: CPT | Mod: GN

## 2024-04-17 PROCEDURE — 83735 ASSAY OF MAGNESIUM: CPT | Performed by: SURGERY

## 2024-04-17 PROCEDURE — 80048 BASIC METABOLIC PNL TOTAL CA: CPT | Performed by: STUDENT IN AN ORGANIZED HEALTH CARE EDUCATION/TRAINING PROGRAM

## 2024-04-17 PROCEDURE — 250N000013 HC RX MED GY IP 250 OP 250 PS 637: Performed by: STUDENT IN AN ORGANIZED HEALTH CARE EDUCATION/TRAINING PROGRAM

## 2024-04-17 PROCEDURE — 250N000011 HC RX IP 250 OP 636: Mod: JZ | Performed by: STUDENT IN AN ORGANIZED HEALTH CARE EDUCATION/TRAINING PROGRAM

## 2024-04-17 PROCEDURE — 36415 COLL VENOUS BLD VENIPUNCTURE: CPT | Performed by: SURGERY

## 2024-04-17 PROCEDURE — 84100 ASSAY OF PHOSPHORUS: CPT | Performed by: SURGERY

## 2024-04-17 PROCEDURE — 250N000013 HC RX MED GY IP 250 OP 250 PS 637: Performed by: SURGERY

## 2024-04-17 PROCEDURE — 85027 COMPLETE CBC AUTOMATED: CPT | Performed by: STUDENT IN AN ORGANIZED HEALTH CARE EDUCATION/TRAINING PROGRAM

## 2024-04-17 PROCEDURE — 250N000011 HC RX IP 250 OP 636

## 2024-04-17 PROCEDURE — 36415 COLL VENOUS BLD VENIPUNCTURE: CPT | Performed by: STUDENT IN AN ORGANIZED HEALTH CARE EDUCATION/TRAINING PROGRAM

## 2024-04-17 PROCEDURE — 999N000128 HC STATISTIC PERIPHERAL IV START W/O US GUIDANCE

## 2024-04-17 RX ORDER — POTASSIUM CHLORIDE 29.8 MG/ML
20 INJECTION INTRAVENOUS ONCE
Status: DISCONTINUED | OUTPATIENT
Start: 2024-04-17 | End: 2024-04-17

## 2024-04-17 RX ORDER — POTASSIUM CHLORIDE 1.5 G/1.58G
20 POWDER, FOR SOLUTION ORAL ONCE
Status: COMPLETED | OUTPATIENT
Start: 2024-04-17 | End: 2024-04-17

## 2024-04-17 RX ADMIN — PANTOPRAZOLE SODIUM 40 MG: 40 TABLET, DELAYED RELEASE ORAL at 07:47

## 2024-04-17 RX ADMIN — HEPARIN SODIUM 5000 UNITS: 5000 INJECTION, SOLUTION INTRAVENOUS; SUBCUTANEOUS at 10:16

## 2024-04-17 RX ADMIN — METHOCARBAMOL 500 MG: 500 TABLET ORAL at 18:39

## 2024-04-17 RX ADMIN — SODIUM CHLORIDE, POTASSIUM CHLORIDE, SODIUM LACTATE AND CALCIUM CHLORIDE 1000 ML: 600; 310; 30; 20 INJECTION, SOLUTION INTRAVENOUS at 13:13

## 2024-04-17 RX ADMIN — CEFTRIAXONE SODIUM 2 G: 2 INJECTION, POWDER, FOR SOLUTION INTRAMUSCULAR; INTRAVENOUS at 15:41

## 2024-04-17 RX ADMIN — HYDROMORPHONE HYDROCHLORIDE 2 MG: 2 TABLET ORAL at 19:50

## 2024-04-17 RX ADMIN — LIDOCAINE 4% 1 PATCH: 40 PATCH TOPICAL at 19:51

## 2024-04-17 RX ADMIN — LIDOCAINE 4% 1 PATCH: 40 PATCH TOPICAL at 07:46

## 2024-04-17 RX ADMIN — ACETAMINOPHEN 975 MG: 325 TABLET, FILM COATED ORAL at 13:13

## 2024-04-17 RX ADMIN — HEPARIN SODIUM 5000 UNITS: 5000 INJECTION, SOLUTION INTRAVENOUS; SUBCUTANEOUS at 02:09

## 2024-04-17 RX ADMIN — HYDRALAZINE HYDROCHLORIDE 10 MG: 20 INJECTION INTRAMUSCULAR; INTRAVENOUS at 06:49

## 2024-04-17 RX ADMIN — POTASSIUM CHLORIDE 20 MEQ: 1.5 POWDER, FOR SOLUTION ORAL at 07:47

## 2024-04-17 RX ADMIN — METRONIDAZOLE 500 MG: 500 INJECTION, SOLUTION INTRAVENOUS at 03:58

## 2024-04-17 RX ADMIN — AMLODIPINE BESYLATE 5 MG: 5 TABLET ORAL at 07:47

## 2024-04-17 RX ADMIN — METOPROLOL TARTRATE 50 MG: 50 TABLET, FILM COATED ORAL at 19:51

## 2024-04-17 RX ADMIN — HYDRALAZINE HYDROCHLORIDE 10 MG: 20 INJECTION INTRAMUSCULAR; INTRAVENOUS at 02:09

## 2024-04-17 RX ADMIN — ACETAMINOPHEN 975 MG: 325 TABLET, FILM COATED ORAL at 05:19

## 2024-04-17 RX ADMIN — METRONIDAZOLE 500 MG: 500 INJECTION, SOLUTION INTRAVENOUS at 19:53

## 2024-04-17 RX ADMIN — HYDROMORPHONE HYDROCHLORIDE 2 MG: 2 TABLET ORAL at 12:07

## 2024-04-17 RX ADMIN — POTASSIUM & SODIUM PHOSPHATES POWDER PACK 280-160-250 MG 1 PACKET: 280-160-250 PACK at 12:07

## 2024-04-17 RX ADMIN — HYDROMORPHONE HYDROCHLORIDE 0.5 MG: 0.2 INJECTION, SOLUTION INTRAMUSCULAR; INTRAVENOUS; SUBCUTANEOUS at 22:57

## 2024-04-17 RX ADMIN — HYDROMORPHONE HYDROCHLORIDE 2 MG: 2 TABLET ORAL at 05:13

## 2024-04-17 RX ADMIN — ROSUVASTATIN CALCIUM 10 MG: 10 TABLET, FILM COATED ORAL at 07:47

## 2024-04-17 RX ADMIN — TIMOLOL MALEATE 1 DROP: 5 SOLUTION/ DROPS OPHTHALMIC at 07:47

## 2024-04-17 RX ADMIN — ACETAMINOPHEN 975 MG: 325 TABLET, FILM COATED ORAL at 21:45

## 2024-04-17 RX ADMIN — METOPROLOL TARTRATE 50 MG: 50 TABLET, FILM COATED ORAL at 07:47

## 2024-04-17 RX ADMIN — POTASSIUM & SODIUM PHOSPHATES POWDER PACK 280-160-250 MG 1 PACKET: 280-160-250 PACK at 07:47

## 2024-04-17 RX ADMIN — HEPARIN SODIUM 5000 UNITS: 5000 INJECTION, SOLUTION INTRAVENOUS; SUBCUTANEOUS at 18:41

## 2024-04-17 RX ADMIN — METRONIDAZOLE 500 MG: 500 INJECTION, SOLUTION INTRAVENOUS at 12:07

## 2024-04-17 ASSESSMENT — ACTIVITIES OF DAILY LIVING (ADL)
ADLS_ACUITY_SCORE: 50
ADLS_ACUITY_SCORE: 46
ADLS_ACUITY_SCORE: 51
ADLS_ACUITY_SCORE: 50
ADLS_ACUITY_SCORE: 51
ADLS_ACUITY_SCORE: 46
ADLS_ACUITY_SCORE: 50
ADLS_ACUITY_SCORE: 46
ADLS_ACUITY_SCORE: 46
ADLS_ACUITY_SCORE: 50
ADLS_ACUITY_SCORE: 51
ADLS_ACUITY_SCORE: 50
ADLS_ACUITY_SCORE: 46
ADLS_ACUITY_SCORE: 50
ADLS_ACUITY_SCORE: 50
ADLS_ACUITY_SCORE: 46
DEPENDENT_IADLS:: INDEPENDENT
ADLS_ACUITY_SCORE: 51

## 2024-04-17 NOTE — PROGRESS NOTES
[id number]  VSS. A fib rates 80's-120's. Normotensive. A febrile. 2L NC. Denies SOB. Coughing up small amount of tan/brown thick secretions. Denies SOB. A&O x4. Minimal pain. Pt reports pain has been well controlled. Bonner in place. Approx 400 UOP post LR bolus. BM x1.

## 2024-04-17 NOTE — PROGRESS NOTES
Patient went into afib at approximately 0800. VS otherwise stable. -130's. Denies chest pain. No team signed in to report to so SICU contacted. No new orders at this time. SICU resident to send names of gen surg residents with plan to contact.

## 2024-04-17 NOTE — PLAN OF CARE
Major Shift Events:  Patient in a-fib since start of shift rate 's. No IV metoprolol given per gen surg given rate improvement.     Neuro: A&Ox4. PRN dilaudid for breakthrough pain management. Generally weak. Otherwise intact.   CV: afib 's. Afebrile. MAP>65  RR: 2L NC, LS clear.  GI/: Bonner patent. UOP low but adequate. 1L LR bolus given over 2 hours. Diet increased to soft & bite sized. Patient requires lots of encouragement with meals. LBM 4/12.  Ax: PIV x2.     Plan: Transfer out of ICU  For vital signs and complete assessments, please see documentation flowsheets.       Goal Outcome Evaluation:      Plan of Care Reviewed With: patient, spouse    Overall Patient Progress: no changeOverall Patient Progress: no change

## 2024-04-17 NOTE — CONSULTS
Care Management Initial Consult    General Information  Assessment completed with: Patient, Spouse or significant other,    Type of CM/SW Visit: Initial Assessment    Primary Care Provider verified and updated as needed: Yes   Readmission within the last 30 days: unable to assess      Reason for Consult: discharge planning  Advance Care Planning: Advance Care Planning Reviewed: no concerns identified, verified with patient (NOK= )          Communication Assessment  Patient's communication style: spoken language (English or Bilingual)             Cognitive  Cognitive/Neuro/Behavioral: .WDL except  Level of Consciousness: lethargic  Arousal Level: opens eyes spontaneously  Orientation: oriented x 4  Mood/Behavior: calm, cooperative  Best Language: 0 - No aphasia  Speech: logical, spontaneous, clear    Living Environment:   People in home: spouse     Current living Arrangements: house (2 stairs from garage entrance, 7 stairs going from ground level inside to upstairs, states 21 stairs total inside if she goes to the basement but only does that for laundry)      Able to return to prior arrangements: yes       Family/Social Support:  Care provided by: self  Provides care for: no one  Marital Status:   , Children, Other (specify) (16 grandkids and some great grandkids so lots of help)          Description of Support System: Supportive, Involved    Support Assessment: Adequate family and caregiver support, Adequate social supports    Current Resources:   Patient receiving home care services: No     Community Resources: None  Equipment currently used at home: cane, straight, walker, rolling, grab bar, toilet, grab bar, tub/shower, shower chair  Supplies currently used at home: Incontinence Supplies    Employment/Financial:  Employment Status: retired (worked at [employer])        Financial Concerns: none   Referral to Financial Worker: No       Does the patient's insurance plan have a 3 day  qualifying hospital stay waiver?  No    Lifestyle & Psychosocial Needs:  Social Determinants of Health     Food Insecurity: Low Risk  (11/15/2023)    Food Insecurity     Within the past 12 months, did you worry that your food would run out before you got money to buy more?: No     Within the past 12 months, did the food you bought just not last and you didn t have money to get more?: No   Depression: Not at risk (1/2/2024)    PHQ-2     PHQ-2 Score: 0   Housing Stability: Low Risk  (11/15/2023)    Housing Stability     Do you have housing? : Yes     Are you worried about losing your housing?: No   Tobacco Use: Medium Risk (4/11/2024)    Patient History     Smoking Tobacco Use: Former     Smokeless Tobacco Use: Never     Passive Exposure: Not on file   Financial Resource Strain: Low Risk  (11/15/2023)    Financial Resource Strain     Within the past 12 months, have you or your family members you live with been unable to get utilities (heat, electricity) when it was really needed?: No   Alcohol Use: Not on file   Transportation Needs: Low Risk  (11/15/2023)    Transportation Needs     Within the past 12 months, has lack of transportation kept you from medical appointments, getting your medicines, non-medical meetings or appointments, work, or from getting things that you need?: No   Physical Activity: Not on file   Interpersonal Safety: Low Risk  (11/15/2023)    Interpersonal Safety     Do you feel physically and emotionally safe where you currently live?: Yes     Within the past 12 months, have you been hit, slapped, kicked or otherwise physically hurt by someone?: No     Within the past 12 months, have you been humiliated or emotionally abused in other ways by your partner or ex-partner?: No   Stress: Not on file   Social Connections: Not on file   Health Literacy: Not on file       Functional Status:  Prior to admission patient needed assistance:   Dependent ADLs:: Ambulation-cane, Ambulation-walker (mostly able to  "grab things at home, uses cane sometimes like grocery shopping, uses walker for chair w/ extended outings)  Dependent IADLs:: Independent       Mental Health Status: N/A        Chemical Dependency Status: N/A    Values/Beliefs:  Spiritual, Cultural Beliefs, Congregational Practices, Values that affect care: yes  Description of Beliefs that Will Affect Care: Yazidi          Additional Information:  SW met with patient and at bedside & discussed patient's therapy recommendations for discharging from the hospital and admitting to a transitional care unit. SW provided patient education on the medicare website's care compare tool (https://www.medicare.gov/care-compare/). SW explained the service & quality ratings for TCUs. SW provided education about their disposition recommendations and discussed discharge planning. Discussed how her  was just at a TCU and how Tessa had a pleasant experience at [facility] in 2018 for TCU. Pt to review paperwork & provide TCU choices to unit SW at their earliest convenience.       Patient had a CMA done less than 30 days ago and had no changes to her previous CMA. CMA from 3/18/24 (SW note by Jim Muhammad)  \"Met with patient at bedside who confirmed her PCP and although she doesn't have a healthcare directive she understands that her  is NOK. She lives in a house with a lot of stairs (21 total) and it sounds like she has adapted well to her physical limitations like going down to the basement she slides the basket one stair down at a time. She is able to drive and still does as most things are located about 1 mile from her. Her  is completley independent, but they are going to transition to a town home soon with little to no stairs. She has two canes one for main floor and one for downstairs and only utilizes a walker for fatigue as it has a seat for extended outings. She completed OP PT/OT several times. The only supplies she uses is for incontinence, and does " "not utilize and community resources other than social security. No further questions or concerns at this time.\"        CAMACHO Rizvi, [facility]   Covering 4C/E   Ph: [phone]  "

## 2024-04-17 NOTE — PLAN OF CARE
[id number]: VSS weaned to 2L via NC overnight. Afebrile. SBP goal less than 160. Prn hydralazine 10mg x2. SR 60-70s, pvcs. Bonner with adequate UO, passing gas no BM. Full liquid diet, poor appetite. Midline incision staples SUKHDEEP. PIV x2 SL, TkO. Transfer to floor when bed available.

## 2024-04-17 NOTE — PROGRESS NOTES
[facility]    Progress Note - Emergency General Surgery Service       Date of Admission:  4/13/2024  Interval History   NAEON. Pain controlled. Passing gas, no BMs. Denies feeling bloated, N/V. Denies SOB and feels that she is breathing better. Has been OOB, but not yet ambulated much.         Physical Exam   Vital Signs: Temp: 98.1  F (36.7  C) Temp src: Oral BP: 136/67 Pulse: (S) (!) 122 (MD notified)   Resp: 16 SpO2: 95 % O2 Device: Nasal cannula Oxygen Delivery: 2 LPM  Weight: 157 lbs 10.06 oz      Gen: lying in bed comfortably  Resp: non labored breathing on 25 LPM HFNC  Abd: soft, non distended, appropriately tend, Midline incision c/d/I closed with staples  Ext: warm and well perfused     Assessment & Plan: Surgery     A/P: 89 years female with PMHx KEVYN, CKD, NSTEMI, Afib who is here for closed loop bowel obstruction and is now POD 3 s/p Ex Lap and POD 2 s/p abdominal midline wound closure. Pt extubated on 4/14. Transferred from ICU on 4/16/24.     This AM, went into Afib with RVR (HR 120s) with elevated BP. Continues to be on HFNC at 25 LPM.     - Metoprolol 5 mg q5 mins x 3 this AM for Afib with RVR  - Okay for CLD today   - Wean O2 as tolerated   - Abx for total of 4 days (4/15-4/18)   - Incentive spirometry  - Continue current pain control regimen  - SQH for DVT ppx   - Encouraged ambulation       Data     I have personally reviewed the following data over the past 24 hrs:    11.0  \   9.2 (L)   / 103 (L)     142 102 28.1 (H) /  90   3.4 27 1.71 (H) \       The patient's care was discussed with the Attending Physician, Dr. Phan and Chief Resident/Fellow.       Vanessa Harper MD  [facility]  All communications related to this note should be expressed to resident/NAPOLEON on call for this team at the time of your communication.

## 2024-04-18 ENCOUNTER — APPOINTMENT (OUTPATIENT)
Dept: PHYSICAL THERAPY | Facility: CLINIC | Age: 89
DRG: 329 | End: 2024-04-18
Payer: COMMERCIAL

## 2024-04-18 ENCOUNTER — APPOINTMENT (OUTPATIENT)
Dept: SPEECH THERAPY | Facility: CLINIC | Age: 89
DRG: 329 | End: 2024-04-18
Payer: COMMERCIAL

## 2024-04-18 LAB
ACANTHOCYTES BLD QL SMEAR: ABNORMAL
ANION GAP SERPL CALCULATED.3IONS-SCNC: 13 MMOL/L (ref 7–15)
AUER BODIES BLD QL SMEAR: ABNORMAL
BASO STIPL BLD QL SMEAR: ABNORMAL
BITE CELLS BLD QL SMEAR: ABNORMAL
BLISTER CELLS BLD QL SMEAR: ABNORMAL
BUN SERPL-MCNC: 30.5 MG/DL (ref 8–23)
BURR CELLS BLD QL SMEAR: SLIGHT
CALCIUM SERPL-MCNC: 8.3 MG/DL (ref 8.8–10.2)
CHLORIDE SERPL-SCNC: 103 MMOL/L (ref 98–107)
CREAT SERPL-MCNC: 1.84 MG/DL (ref 0.51–0.95)
DACRYOCYTES BLD QL SMEAR: ABNORMAL
DEPRECATED HCO3 PLAS-SCNC: 25 MMOL/L (ref 22–29)
EGFRCR SERPLBLD CKD-EPI 2021: 26 ML/MIN/1.73M2
ELLIPTOCYTES BLD QL SMEAR: ABNORMAL
ERYTHROCYTE [DISTWIDTH] IN BLOOD BY AUTOMATED COUNT: 19 % (ref 10–15)
FRAGMENTS BLD QL SMEAR: ABNORMAL
GLUCOSE BLDC GLUCOMTR-MCNC: 108 MG/DL (ref 70–99)
GLUCOSE BLDC GLUCOMTR-MCNC: 121 MG/DL (ref 70–99)
GLUCOSE BLDC GLUCOMTR-MCNC: 94 MG/DL (ref 70–99)
GLUCOSE SERPL-MCNC: 103 MG/DL (ref 70–99)
HCT VFR BLD AUTO: 32.2 % (ref 35–47)
HGB BLD-MCNC: 9.9 G/DL (ref 11.7–15.7)
HGB C CRYSTALS: ABNORMAL
HOWELL-JOLLY BOD BLD QL SMEAR: ABNORMAL
MAGNESIUM SERPL-MCNC: 2.2 MG/DL (ref 1.7–2.3)
MCH RBC QN AUTO: 26.8 PG (ref 26.5–33)
MCHC RBC AUTO-ENTMCNC: 30.7 G/DL (ref 31.5–36.5)
MCV RBC AUTO: 87 FL (ref 78–100)
NEUTS HYPERSEG BLD QL SMEAR: ABNORMAL
PHOSPHATE SERPL-MCNC: 2.9 MG/DL (ref 2.5–4.5)
PLAT MORPH BLD: ABNORMAL
PLATELET # BLD AUTO: 157 10E3/UL (ref 150–450)
POLYCHROMASIA BLD QL SMEAR: SLIGHT
POTASSIUM SERPL-SCNC: 3.5 MMOL/L (ref 3.4–5.3)
RBC # BLD AUTO: 3.7 10E6/UL (ref 3.8–5.2)
RBC AGGLUT BLD QL: ABNORMAL
RBC MORPH BLD: ABNORMAL
ROULEAUX BLD QL SMEAR: ABNORMAL
SICKLE CELLS BLD QL SMEAR: ABNORMAL
SMUDGE CELLS BLD QL SMEAR: ABNORMAL
SODIUM SERPL-SCNC: 141 MMOL/L (ref 135–145)
SPHEROCYTES BLD QL SMEAR: ABNORMAL
STOMATOCYTES BLD QL SMEAR: ABNORMAL
TARGETS BLD QL SMEAR: SLIGHT
TOXIC GRANULES BLD QL SMEAR: ABNORMAL
VARIANT LYMPHS BLD QL SMEAR: ABNORMAL
WBC # BLD AUTO: 8.7 10E3/UL (ref 4–11)

## 2024-04-18 PROCEDURE — 250N000013 HC RX MED GY IP 250 OP 250 PS 637: Performed by: STUDENT IN AN ORGANIZED HEALTH CARE EDUCATION/TRAINING PROGRAM

## 2024-04-18 PROCEDURE — 92526 ORAL FUNCTION THERAPY: CPT | Mod: GN

## 2024-04-18 PROCEDURE — 250N000011 HC RX IP 250 OP 636: Performed by: STUDENT IN AN ORGANIZED HEALTH CARE EDUCATION/TRAINING PROGRAM

## 2024-04-18 PROCEDURE — 84100 ASSAY OF PHOSPHORUS: CPT | Performed by: SURGERY

## 2024-04-18 PROCEDURE — 250N000011 HC RX IP 250 OP 636: Mod: JZ | Performed by: STUDENT IN AN ORGANIZED HEALTH CARE EDUCATION/TRAINING PROGRAM

## 2024-04-18 PROCEDURE — 250N000011 HC RX IP 250 OP 636

## 2024-04-18 PROCEDURE — 250N000013 HC RX MED GY IP 250 OP 250 PS 637: Performed by: SURGERY

## 2024-04-18 PROCEDURE — 80048 BASIC METABOLIC PNL TOTAL CA: CPT | Performed by: STUDENT IN AN ORGANIZED HEALTH CARE EDUCATION/TRAINING PROGRAM

## 2024-04-18 PROCEDURE — 97530 THERAPEUTIC ACTIVITIES: CPT | Mod: GP

## 2024-04-18 PROCEDURE — 999N000157 HC STATISTIC RCP TIME EA 10 MIN

## 2024-04-18 PROCEDURE — 250N000013 HC RX MED GY IP 250 OP 250 PS 637

## 2024-04-18 PROCEDURE — 83735 ASSAY OF MAGNESIUM: CPT | Performed by: SURGERY

## 2024-04-18 PROCEDURE — 120N000003 HC R&B IMCU UMMC

## 2024-04-18 PROCEDURE — 85027 COMPLETE CBC AUTOMATED: CPT | Performed by: STUDENT IN AN ORGANIZED HEALTH CARE EDUCATION/TRAINING PROGRAM

## 2024-04-18 PROCEDURE — 36415 COLL VENOUS BLD VENIPUNCTURE: CPT | Performed by: STUDENT IN AN ORGANIZED HEALTH CARE EDUCATION/TRAINING PROGRAM

## 2024-04-18 RX ORDER — AMLODIPINE BESYLATE 10 MG/1
10 TABLET ORAL DAILY
Status: DISCONTINUED | OUTPATIENT
Start: 2024-04-19 | End: 2024-04-22 | Stop reason: HOSPADM

## 2024-04-18 RX ORDER — POTASSIUM CHLORIDE 750 MG/1
10 TABLET, EXTENDED RELEASE ORAL DAILY
Status: DISCONTINUED | OUTPATIENT
Start: 2024-04-18 | End: 2024-04-22 | Stop reason: HOSPADM

## 2024-04-18 RX ORDER — POTASSIUM CHLORIDE 750 MG/1
10 TABLET, EXTENDED RELEASE ORAL ONCE
Status: COMPLETED | OUTPATIENT
Start: 2024-04-18 | End: 2024-04-18

## 2024-04-18 RX ORDER — POTASSIUM CHLORIDE 750 MG/1
10 CAPSULE, EXTENDED RELEASE ORAL DAILY
Status: DISCONTINUED | OUTPATIENT
Start: 2024-04-18 | End: 2024-04-18

## 2024-04-18 RX ORDER — CLOPIDOGREL BISULFATE 75 MG/1
75 TABLET ORAL DAILY
Status: DISCONTINUED | OUTPATIENT
Start: 2024-04-18 | End: 2024-04-22 | Stop reason: HOSPADM

## 2024-04-18 RX ORDER — PANTOPRAZOLE SODIUM 40 MG/1
40 TABLET, DELAYED RELEASE ORAL
Status: DISCONTINUED | OUTPATIENT
Start: 2024-04-18 | End: 2024-04-22 | Stop reason: HOSPADM

## 2024-04-18 RX ADMIN — METOPROLOL TARTRATE 50 MG: 50 TABLET, FILM COATED ORAL at 06:31

## 2024-04-18 RX ADMIN — POTASSIUM CHLORIDE 10 MEQ: 750 TABLET, EXTENDED RELEASE ORAL at 09:36

## 2024-04-18 RX ADMIN — METRONIDAZOLE 500 MG: 500 INJECTION, SOLUTION INTRAVENOUS at 12:21

## 2024-04-18 RX ADMIN — HEPARIN SODIUM 5000 UNITS: 5000 INJECTION, SOLUTION INTRAVENOUS; SUBCUTANEOUS at 04:12

## 2024-04-18 RX ADMIN — METRONIDAZOLE 500 MG: 500 INJECTION, SOLUTION INTRAVENOUS at 04:12

## 2024-04-18 RX ADMIN — PANTOPRAZOLE SODIUM 40 MG: 40 TABLET, DELAYED RELEASE ORAL at 16:37

## 2024-04-18 RX ADMIN — TIMOLOL MALEATE 1 DROP: 5 SOLUTION/ DROPS OPHTHALMIC at 08:01

## 2024-04-18 RX ADMIN — ACETAMINOPHEN 975 MG: 325 TABLET, FILM COATED ORAL at 22:50

## 2024-04-18 RX ADMIN — HEPARIN SODIUM 5000 UNITS: 5000 INJECTION, SOLUTION INTRAVENOUS; SUBCUTANEOUS at 18:47

## 2024-04-18 RX ADMIN — HEPARIN SODIUM 5000 UNITS: 5000 INJECTION, SOLUTION INTRAVENOUS; SUBCUTANEOUS at 09:36

## 2024-04-18 RX ADMIN — CEFTRIAXONE SODIUM 2 G: 2 INJECTION, POWDER, FOR SOLUTION INTRAMUSCULAR; INTRAVENOUS at 15:13

## 2024-04-18 RX ADMIN — METRONIDAZOLE 500 MG: 500 INJECTION, SOLUTION INTRAVENOUS at 19:51

## 2024-04-18 RX ADMIN — ACETAMINOPHEN 975 MG: 325 TABLET, FILM COATED ORAL at 06:03

## 2024-04-18 RX ADMIN — AMLODIPINE BESYLATE 5 MG: 5 TABLET ORAL at 07:55

## 2024-04-18 RX ADMIN — ACETAMINOPHEN 975 MG: 325 TABLET, FILM COATED ORAL at 15:13

## 2024-04-18 RX ADMIN — LIDOCAINE 4% 1 PATCH: 40 PATCH TOPICAL at 19:50

## 2024-04-18 RX ADMIN — METHOCARBAMOL 500 MG: 500 TABLET ORAL at 07:55

## 2024-04-18 RX ADMIN — METOPROLOL TARTRATE 50 MG: 50 TABLET, FILM COATED ORAL at 18:47

## 2024-04-18 RX ADMIN — CLOPIDOGREL BISULFATE 75 MG: 75 TABLET ORAL at 09:35

## 2024-04-18 RX ADMIN — ONDANSETRON 4 MG: 4 TABLET, ORALLY DISINTEGRATING ORAL at 09:03

## 2024-04-18 RX ADMIN — ONDANSETRON 4 MG: 4 TABLET, ORALLY DISINTEGRATING ORAL at 09:02

## 2024-04-18 RX ADMIN — PANTOPRAZOLE SODIUM 40 MG: 40 TABLET, DELAYED RELEASE ORAL at 06:31

## 2024-04-18 RX ADMIN — ROSUVASTATIN CALCIUM 10 MG: 10 TABLET, FILM COATED ORAL at 07:55

## 2024-04-18 ASSESSMENT — ACTIVITIES OF DAILY LIVING (ADL)
ADLS_ACUITY_SCORE: 49
ADLS_ACUITY_SCORE: 50
ADLS_ACUITY_SCORE: 49
ADLS_ACUITY_SCORE: 50
ADLS_ACUITY_SCORE: 49
ADLS_ACUITY_SCORE: 50
ADLS_ACUITY_SCORE: 50
ADLS_ACUITY_SCORE: 49
ADLS_ACUITY_SCORE: 49
ADLS_ACUITY_SCORE: 50
ADLS_ACUITY_SCORE: 49
ADLS_ACUITY_SCORE: 49
ADLS_ACUITY_SCORE: 50
ADLS_ACUITY_SCORE: 49
ADLS_ACUITY_SCORE: 50
ADLS_ACUITY_SCORE: 49
ADLS_ACUITY_SCORE: 50
ADLS_ACUITY_SCORE: 49

## 2024-04-18 NOTE — PROGRESS NOTES
Major Shift Events:  No acute events overnight. Aox4, and makes needs known. VSS on 2L NC. Pt in and out of afib with controlled rate for most of night, at 0600 pt HR jumped to 130-140 and was sustaining afib with RVR. Per team okay to give morning metoprolol early. Tolerating diet. Obnner with adequate urine output. 1 Large BM overnight.   Plan: Continue plan of care.   For vital signs and complete assessments, please see documentation flowsheets.

## 2024-04-18 NOTE — PROGRESS NOTES
[facility]    Progress Note - Emergency General Surgery Service       Date of Admission:  4/13/2024  Interval History   Events:JED    Pt continues to be in Afib with HR 70s-90s overnight. Paged at 06:25 regarding HR to 130s, /81. Scheduled AM metoprolol PO to be given prior to rounds.      Pt reports feeling well. At cream of wheat yesterday and tolerated liquids. No N/V. Pain controlled. Passed BM.     Physical Exam   Vital Signs: Temp: 97.6  F (36.4  C) Temp src: Oral BP: (!) 141/81 Pulse: (!) 132   Resp: 18 SpO2: 94 % O2 Device: Nasal cannula Oxygen Delivery: 2 LPM  Weight: 157 lbs 10.06 oz      Gen: lying in bed comfortably  Resp: non labored breathing on 2L NC  Abd: soft, non distended, appropriately tend, Midline incision c/d/I closed with staples  Ext: warm and well perfused     Assessment & Plan: Surgery     A/P: 89 years female with PMHx KEYVN, CKD, NSTEMI, Afib who is here for closed loop bowel obstruction and is now POD 4 s/p Ex Lap and POD 3 s/p abdominal midline wound closure. Pt extubated on 4/14. Transferred from ICU on 4/16/24.     Continues to be in Afib with HR <120s while at bedside this morning, asymptomatic and normotensive. She is progressing appropriately from a post-op standpoint with ROBF, tolerating diet, and decreasing supplemental O2 requirement.     - Continue to monitor Afib - No intervention needed if she remains normotensive with HR < 120   - Soft and bite sized diet (IDDSI 6) with thin liquids.   -Discontinue Bonner catheter   - Wean O2 as tolerated   - Abx for total of 4 days (4/15-4/18)   - Incentive spirometry   - Continue current pain control regimen  - SQH for DVT ppx   - Encouraged ambulation     Data     I have personally reviewed the following data over the past 24 hrs:    N/A  \   N/A   / N/A     N/A N/A N/A /  94   3.8 N/A N/A \       The patient's care was discussed with the Chief Resident/Fellow, to be discussed with  staff attending Dr. Phan.        Vanessa Harper MD  [facility]  All communications related to this note should be expressed to resident/NAPOLEON on call for this team at the time of your communication.

## 2024-04-18 NOTE — PROGRESS NOTES
Care Management    04/28: CHW met with pt at bedside to review pt's preference for TCU placement. Patient had not yet had a chance to make decisions on where to send referrals. CHW agreed to meet with patient later today to give her more time to review the TCU list. If patient transfers upstairs before writer can meet again, the floor's  or CHW will continue to follow for discharge planning.    Leigh MARTINEZ  ICU & ED/OBS  Phone: [phone]

## 2024-04-18 NOTE — PLAN OF CARE
Care provided from [id number]    Temp: 97.5  F (36.4  C) Temp src: Oral BP: 107/72 Pulse: 100   Resp: 16 SpO2: 98 % O2 Device: Nasal cannula Oxygen Delivery: 2 LPM     Neuro: A&Ox4. Speech is clear, spontaneous, and logical. Pupils equal, round, and reactive to light. Pt denies headache or changes in vision/hearing. Pt is hard of hearing at baseline.  Cardiac: Pt is on tele and has been fluctuating between sinus arrhythmia and afib. Primary team is aware of this. Pt denies chest pain or palpitations. Afebrile. Vital signs stable.  Heart rate started to trend in the 120s-130s this evening. Primary team contacted and gave okay to administer 2000 dose of Metoprolol early.   Respiratory: Sating>94% on 2 L oxygen via nasal canula. Pt denies shortness of breath or difficulty breathing. Intermittent nonproductive cough noted. Lung sounds clear/diminished on auscultation.   GI/: Hameed catheter removed this AM at approximately 1014 per primary team orders. Pt denies difficulty voiding or having a bowel movement. Pt did void after removal of hameed catheter. Pre-void bladder scan revealed approximately 314 mL. Pt voided 275 mL nathan urine and post-void residual scan revealed approximately 74 mL. In total, pt has voided approximately 525 mL this shift since catheter removal. No bowel movement this shift. Last bowel movement 4/17/2024. Bowel sounds audible in all quadrants. Pt passing flatus. Pt verbalized intermittent nausea mid-morning. Zofran x1 administered.  Diet/appetite: Tolerating soft and bite sized (Level 6) diet. Pt ordered and ate 100% of breakfast this AM. Pt declined to eat lunch.    Activity:  Assist of 2 with gait belt and walker. Pt worked with PT this shift. Pt was up in chair x1, to commode x4, and to bed x3.  Pain: Pt verbalizes pain that is incisional in nature rating 2/10. PRN Robaxin x1 administered.  Skin: Left lip bruise, generalized bruising, abdominal incision (staples/open to  air/approximated)  LDA's: Left anterior peripheral IV (saline locked), Left superior peripheral IV (saline locked)    Plan: Continue with POC. Encourage patient to ambulate and use toilet as tolerated. Notify General Surgery team with changes. Pt has orders for IMC transfer. Currently awaiting bed availability. Pt likely to transition to TCU upon discharge per care coordinator notes.

## 2024-04-18 NOTE — PLAN OF CARE
Goal Outcome Evaluation:      Problem: Adult Inpatient Plan of Care  Goal: Plan of Care Review  Description: The Plan of Care Review/Shift note should be completed every shift.  The Outcome Evaluation is a brief statement about your assessment that the patient is improving, declining, or no change.  This information will be displayed automatically on your shift  note.  Flowsheets (Taken 4/18/2024 3255)  Outcome Evaluation: Discharge recs are TCU, referrals will be sent soon  Plan of Care Reviewed With: patient  Overall Patient Progress: improving    CAMACHO Rizvi, LGSW  [facility]   Covering 4C/E   Ph: [phone]

## 2024-04-18 NOTE — TELEPHONE ENCOUNTER
Post Bronchoscopy Patient Instructions:    April 18, 2024  Shakila Kapoor    Your procedure was completed (bronchoscopy with biopsy) without any immediate complications.    You may cough up scant amount of blood for the next 12-24 hours. If you have excessive cough with blood, chest pain, shortness of breath, please report to the closest emergency room.    You may experience low grade (less than 100.5 F) fever next 24 hours, if so, you can take Tylenol. If the fever persists more than 24 hours, please contact to our office or your primary care provider.    Our office (Thoracic/Pulmonary--[phone]) will call you with the results of any samples taken during the procedure. Please note that you may get a result notification through  My Chart  before us calling you as the Laboratories are instructed to release the results as soon as they are available to the patients and providers at the same time. Please allow your provider 24 hours call you to discuss the results.    You may resume your diet as it was prior to procedure.    You may resume your medications after the procedure unless you are instructed to do differently.     Please follow instructions from the nursing staff upon discharge in terms of activity. In general, you should avoid any attention or motor skill requiring activities (e.g., driving or operating any motorized vehicle) for 24 hours as you might be still under the effect of sedation medications. Please make sure an adult to accompany you next 24 hours.     Should you have any question, please do not hesitate to call our office.    Ge Parham MD   Writer contacted patient to inform them we need to move their appointment 1/6/22 from 2:30 PM to 2:00 PM. Patient confirmed change.    Juan London, EMT

## 2024-04-19 ENCOUNTER — APPOINTMENT (OUTPATIENT)
Dept: PHYSICAL THERAPY | Facility: CLINIC | Age: 89
DRG: 329 | End: 2024-04-19
Payer: COMMERCIAL

## 2024-04-19 ENCOUNTER — APPOINTMENT (OUTPATIENT)
Dept: OCCUPATIONAL THERAPY | Facility: CLINIC | Age: 89
DRG: 329 | End: 2024-04-19
Payer: COMMERCIAL

## 2024-04-19 LAB
ANION GAP SERPL CALCULATED.3IONS-SCNC: 15 MMOL/L (ref 7–15)
BUN SERPL-MCNC: 29.8 MG/DL (ref 8–23)
CALCIUM SERPL-MCNC: 8.7 MG/DL (ref 8.8–10.2)
CHLORIDE SERPL-SCNC: 105 MMOL/L (ref 98–107)
CREAT SERPL-MCNC: 2.02 MG/DL (ref 0.51–0.95)
DEPRECATED HCO3 PLAS-SCNC: 22 MMOL/L (ref 22–29)
EGFRCR SERPLBLD CKD-EPI 2021: 23 ML/MIN/1.73M2
ERYTHROCYTE [DISTWIDTH] IN BLOOD BY AUTOMATED COUNT: 18.6 % (ref 10–15)
GLUCOSE BLDC GLUCOMTR-MCNC: 101 MG/DL (ref 70–99)
GLUCOSE BLDC GLUCOMTR-MCNC: 80 MG/DL (ref 70–99)
GLUCOSE SERPL-MCNC: 94 MG/DL (ref 70–99)
HCT VFR BLD AUTO: 32.2 % (ref 35–47)
HGB BLD-MCNC: 10.1 G/DL (ref 11.7–15.7)
MAGNESIUM SERPL-MCNC: 2.1 MG/DL (ref 1.7–2.3)
MCH RBC QN AUTO: 27.4 PG (ref 26.5–33)
MCHC RBC AUTO-ENTMCNC: 31.4 G/DL (ref 31.5–36.5)
MCV RBC AUTO: 88 FL (ref 78–100)
PHOSPHATE SERPL-MCNC: 3 MG/DL (ref 2.5–4.5)
PLATELET # BLD AUTO: 165 10E3/UL (ref 150–450)
POTASSIUM SERPL-SCNC: 3.4 MMOL/L (ref 3.4–5.3)
POTASSIUM SERPL-SCNC: 4 MMOL/L (ref 3.4–5.3)
RBC # BLD AUTO: 3.68 10E6/UL (ref 3.8–5.2)
SODIUM SERPL-SCNC: 142 MMOL/L (ref 135–145)
WBC # BLD AUTO: 7.5 10E3/UL (ref 4–11)

## 2024-04-19 PROCEDURE — 36415 COLL VENOUS BLD VENIPUNCTURE: CPT | Performed by: SURGERY

## 2024-04-19 PROCEDURE — 250N000013 HC RX MED GY IP 250 OP 250 PS 637: Performed by: SURGERY

## 2024-04-19 PROCEDURE — 84132 ASSAY OF SERUM POTASSIUM: CPT | Performed by: SURGERY

## 2024-04-19 PROCEDURE — 250N000013 HC RX MED GY IP 250 OP 250 PS 637: Performed by: STUDENT IN AN ORGANIZED HEALTH CARE EDUCATION/TRAINING PROGRAM

## 2024-04-19 PROCEDURE — 250N000011 HC RX IP 250 OP 636: Mod: JZ | Performed by: STUDENT IN AN ORGANIZED HEALTH CARE EDUCATION/TRAINING PROGRAM

## 2024-04-19 PROCEDURE — 97530 THERAPEUTIC ACTIVITIES: CPT | Mod: GO

## 2024-04-19 PROCEDURE — 83735 ASSAY OF MAGNESIUM: CPT | Performed by: SURGERY

## 2024-04-19 PROCEDURE — 84100 ASSAY OF PHOSPHORUS: CPT | Performed by: SURGERY

## 2024-04-19 PROCEDURE — 97116 GAIT TRAINING THERAPY: CPT | Mod: GP | Performed by: PHYSICAL THERAPIST

## 2024-04-19 PROCEDURE — 97530 THERAPEUTIC ACTIVITIES: CPT | Mod: GP | Performed by: PHYSICAL THERAPIST

## 2024-04-19 PROCEDURE — 250N000013 HC RX MED GY IP 250 OP 250 PS 637

## 2024-04-19 PROCEDURE — 36415 COLL VENOUS BLD VENIPUNCTURE: CPT | Performed by: STUDENT IN AN ORGANIZED HEALTH CARE EDUCATION/TRAINING PROGRAM

## 2024-04-19 PROCEDURE — 120N000002 HC R&B MED SURG/OB UMMC

## 2024-04-19 PROCEDURE — 85027 COMPLETE CBC AUTOMATED: CPT | Performed by: STUDENT IN AN ORGANIZED HEALTH CARE EDUCATION/TRAINING PROGRAM

## 2024-04-19 PROCEDURE — 250N000011 HC RX IP 250 OP 636

## 2024-04-19 PROCEDURE — 80048 BASIC METABOLIC PNL TOTAL CA: CPT | Performed by: STUDENT IN AN ORGANIZED HEALTH CARE EDUCATION/TRAINING PROGRAM

## 2024-04-19 RX ORDER — POTASSIUM CHLORIDE 750 MG/1
20 TABLET, EXTENDED RELEASE ORAL ONCE
Status: COMPLETED | OUTPATIENT
Start: 2024-04-19 | End: 2024-04-19

## 2024-04-19 RX ADMIN — HYDROMORPHONE HYDROCHLORIDE 2 MG: 2 TABLET ORAL at 13:25

## 2024-04-19 RX ADMIN — PANTOPRAZOLE SODIUM 40 MG: 40 TABLET, DELAYED RELEASE ORAL at 15:58

## 2024-04-19 RX ADMIN — HYDROMORPHONE HYDROCHLORIDE 2 MG: 2 TABLET ORAL at 18:36

## 2024-04-19 RX ADMIN — HEPARIN SODIUM 5000 UNITS: 5000 INJECTION, SOLUTION INTRAVENOUS; SUBCUTANEOUS at 01:50

## 2024-04-19 RX ADMIN — ACETAMINOPHEN 975 MG: 325 TABLET, FILM COATED ORAL at 13:18

## 2024-04-19 RX ADMIN — ACETAMINOPHEN 975 MG: 325 TABLET, FILM COATED ORAL at 06:07

## 2024-04-19 RX ADMIN — ACETAMINOPHEN 975 MG: 325 TABLET, FILM COATED ORAL at 21:06

## 2024-04-19 RX ADMIN — METOPROLOL TARTRATE 50 MG: 50 TABLET, FILM COATED ORAL at 07:00

## 2024-04-19 RX ADMIN — TIMOLOL MALEATE 1 DROP: 5 SOLUTION/ DROPS OPHTHALMIC at 07:41

## 2024-04-19 RX ADMIN — ROSUVASTATIN CALCIUM 10 MG: 10 TABLET, FILM COATED ORAL at 07:39

## 2024-04-19 RX ADMIN — METHOCARBAMOL 500 MG: 500 TABLET ORAL at 16:04

## 2024-04-19 RX ADMIN — LIDOCAINE 4% 1 PATCH: 40 PATCH TOPICAL at 21:06

## 2024-04-19 RX ADMIN — AMLODIPINE BESYLATE 10 MG: 10 TABLET ORAL at 07:39

## 2024-04-19 RX ADMIN — METRONIDAZOLE 500 MG: 500 INJECTION, SOLUTION INTRAVENOUS at 04:07

## 2024-04-19 RX ADMIN — CLOPIDOGREL BISULFATE 75 MG: 75 TABLET ORAL at 07:39

## 2024-04-19 RX ADMIN — HYDROMORPHONE HYDROCHLORIDE 2 MG: 2 TABLET ORAL at 07:52

## 2024-04-19 RX ADMIN — PANTOPRAZOLE SODIUM 40 MG: 40 TABLET, DELAYED RELEASE ORAL at 07:39

## 2024-04-19 RX ADMIN — HEPARIN SODIUM 5000 UNITS: 5000 INJECTION, SOLUTION INTRAVENOUS; SUBCUTANEOUS at 09:58

## 2024-04-19 RX ADMIN — HEPARIN SODIUM 5000 UNITS: 5000 INJECTION, SOLUTION INTRAVENOUS; SUBCUTANEOUS at 18:36

## 2024-04-19 RX ADMIN — POTASSIUM CHLORIDE 10 MEQ: 750 TABLET, EXTENDED RELEASE ORAL at 07:39

## 2024-04-19 RX ADMIN — POTASSIUM CHLORIDE 20 MEQ: 750 TABLET, EXTENDED RELEASE ORAL at 09:58

## 2024-04-19 RX ADMIN — METOPROLOL TARTRATE 50 MG: 50 TABLET, FILM COATED ORAL at 21:07

## 2024-04-19 ASSESSMENT — ACTIVITIES OF DAILY LIVING (ADL)
ADLS_ACUITY_SCORE: 36

## 2024-04-19 NOTE — PROGRESS NOTES
Major Shift Events:  No acute events overnight. Aox4, and makes needs known. Pain well controlled on scheduled tylenol, declines prn meds. VSS on 2L NC. Afib, controlled rate for most of night, morning metoprolol given a little early for higher rate this am. Normotensive, no interventions. Lung sounds clear, good productive cough. Independently using IS. Afebrile. Tolerating diet and thin liquids. Up to bedside commode with 1 assist, good urine output. Multiple large BM overnight.   Plan: Transfer to floor when bed available.  For vital signs and complete assessments, please see documentation flowsheets.

## 2024-04-19 NOTE — PLAN OF CARE
Arrived from: 4E to 7B at 1130   Belongings/meds: With patient   2 RN Skin Assessment Completed by: Maureen CHOW and Nuzhat FERNANDEZ  Non-intact findings documented (yes/no/NA): Yes; midline abd incision, bruising throughout, blanchable redness to buttocks, skin breakdown to pannus    Status: Admitted for closed loop bowel obstruction, POD 6 ex lap, POD 5 wound closure.   Vitals: A-fib 110s-120s. Afebrile. SBP <160 without intervention. MAPs >65.   Neuros: A&Ox4. ARRIOLA. PERRLA  IV: PIV x2 SL   Labs/Electrolytes: K+ recheck 4.0  Resp/trach: RA, LS clear   Diet: Regular diet + thins  Bowel status: BM x1 this shift, loose   : Voiding spont  Skin: See above  Pain: Managing abd pain with scheduled meds + prn PO dilaudid  Activity: Up with 1/GB walker, abd binder in place when oob  Plan: Plan for TCU. Continue with POC

## 2024-04-19 NOTE — PLAN OF CARE
Major Shift Events:  Transferred to 7B. Pt had a bag of clothes go upstairs with her. No major changes from previous shift. Pt was in a fib all morning. Am metoprolol was given prior to writer coming in, K was replaced. Pt walked in the halls with therapy. Tolerating PO. Pain was the main complaint, sched Tylenol and PRN dilaudid given.    Plan: Pt was transferred to the floor. SW working on TCU placement    For vital signs and complete assessments, please see documentation flowsheets.       Goal Outcome Evaluation:      Plan of Care Reviewed With: patient    Overall Patient Progress: improvingOverall Patient Progress: improving

## 2024-04-19 NOTE — PROGRESS NOTES
CLINICAL NUTRITION SERVICES - ASSESSMENT NOTE     Nutrition Prescription    RECOMMENDATIONS FOR MDs/PROVIDERS TO ORDER:  Texted EGS provider to update diet order per SLP recs for Regular Diet and thin liquids on 4/18.  -Notified bedside RN of SLP diet recs and they will address as well    Malnutrition Status:    Patient does not meet two of the established criteria necessary for diagnosing malnutrition but is at risk for malnutrition    Recommendations already ordered by Registered Dietitian (RD):  Supplements:   -Butter Pecan Magic Cup @ lunch  -PRN snacks/supplements if within diet order    Future/Additional Recommendations:  Monitor PO adequacy and adjust ONS if needed.     REASON FOR ASSESSMENT  Tessa Mansfield is a/an 89 year old female assessed by the dietitian for LOS    NUTRITION HISTORY  Pt seen in 2013 by inpatient RD for refresher on heart healthy diet education.     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    Met with pt at bedside. She shares she normally has a good appetite and baseline and enjoys eating. No nutrition concerns PTA. Was maintaining wt.     CURRENT NUTRITION ORDERS  Diet: Soft & Bite Sized Diet (level 6) + Thin Liquids (level 0) - had been NPO from admit on 4/13 to 4/16   -->SLP on 4/18 cleared for Regular diet with thin liquids  Intake/Tolerance: Pt reports no appetite which is atypical for her. She's interested in cream of potato soup and a butter pecan Magic Cup for lunch so writer placed this order per her request.    LABS  Labs reviewed  Electrolytes  Potassium (mmol/L)   Date Value   04/19/2024 3.4   04/18/2024 3.5   04/17/2024 3.8   11/21/2022 4.8   08/08/2022 4.5   06/17/2022 4.6   08/26/2020 4.4   02/13/2020 4.5   01/06/2020 4.6     Potassium POCT (mmol/L)   Date Value   04/13/2024 4.0     Phosphorus (mg/dL)   Date Value   04/19/2024 3.0   04/18/2024 2.9   04/17/2024 2.4 (L)   04/16/2024 5.9 (H)   04/16/2024 2.6   08/26/2020 3.6   02/13/2020 3.8   01/06/2020 3.3   07/10/2019 2.9   05/02/2019 3.5     "Blood Glucose  Glucose (mg/dL)   Date Value   04/19/2024 94   04/18/2024 103 (H)   11/21/2022 79   08/08/2022 99   06/17/2022 87   05/17/2022 96   09/13/2021 83   08/26/2020 84   02/13/2020 110 (H)   01/06/2020 88   10/15/2019 87   07/10/2019 102 (H)     GLUCOSE BY METER POCT (mg/dL)   Date Value   04/19/2024 101 (H)   04/18/2024 121 (H)   04/18/2024 108 (H)   04/18/2024 94   04/17/2024 134 (H)    Inflammatory Markers  CRP Inflammation (mg/L)   Date Value   10/01/2021 3.5   07/25/2010 5.7   07/18/2010 44.4 (H)     WBC (10e9/L)   Date Value   08/26/2020 5.4   10/15/2019 8.0   06/25/2018 8.6     WBC Count (10e3/uL)   Date Value   04/19/2024 7.5   04/18/2024 8.7   04/17/2024 11.0     Albumin (g/dL)   Date Value   04/13/2024 2.9 (L)   04/13/2024 4.5   04/11/2024 4.3   11/21/2022 3.8   08/08/2022 3.6   09/13/2021 3.8   08/26/2020 3.6   02/13/2020 3.6   01/06/2020 3.8      Magnesium (mg/dL)   Date Value   04/19/2024 2.1   04/18/2024 2.2   04/17/2024 2.5 (H)   01/06/2020 2.4 (H)   05/02/2019 2.4 (H)   04/17/2019 2.4 (H)     Sodium (mmol/L)   Date Value   04/19/2024 142   04/18/2024 141   04/17/2024 142   08/26/2020 141   02/13/2020 137   01/06/2020 142    Renal  Urea Nitrogen (mg/dL)   Date Value   04/19/2024 29.8 (H)   04/18/2024 30.5 (H)   04/17/2024 28.1 (H)   11/21/2022 35 (H)   08/08/2022 46 (H)   06/17/2022 32 (H)   08/26/2020 41 (H)   02/13/2020 43 (H)   01/06/2020 33 (H)     Creatinine (mg/dL)   Date Value   04/19/2024 2.02 (H)   04/18/2024 1.84 (H)   04/17/2024 1.71 (H)   08/26/2020 1.70 (H)   02/13/2020 1.90 (H)   01/06/2020 1.76 (H)     Additional  Triglycerides (mg/dL)   Date Value   11/20/2023 124   06/17/2022 144   08/26/2020 149   06/25/2018 136   11/08/2017 170 (H)     Ketones Urine (mg/dL)   Date Value   03/17/2024 Negative   04/13/2017 Neg         MEDICATIONS  Medications reviewed    ANTHROPOMETRICS  Height: 167.6 cm (5' 6\")  Most Recent Weight: 71.9 kg (158 lb 8.2 oz)    IBW: 59.1 kg   BMI: 25.58 kg/m2; " Overweight BMI 25-29.9  Weight History: No significant recent wt loss - pt reports wt is stable  Wt Readings from Last 20 Encounters:   04/19/24 71.9 kg (158 lb 8.2 oz)   04/11/24 71 kg (156 lb 8 oz)   04/03/24 69.4 kg (153 lb)   03/18/24 71.1 kg (156 lb 12.8 oz)   02/20/24 70.3 kg (155 lb)   02/19/24 70.3 kg (155 lb)   01/02/24 73.4 kg (161 lb 12.8 oz)   11/22/23 71.4 kg (157 lb 6.4 oz)   11/20/23 72.9 kg (160 lb 11.2 oz)   11/15/23 72.8 kg (160 lb 9.6 oz)   11/06/23 72.1 kg (158 lb 15.2 oz)   10/09/23 72 kg (158 lb 12.8 oz)   10/02/23 71.7 kg (158 lb)   08/28/23 75.8 kg (167 lb 3.2 oz)   08/09/23 75.1 kg (165 lb 8 oz)   07/31/23 76.2 kg (167 lb 14.4 oz)   07/22/23 80 kg (176 lb 5.9 oz)   07/13/23 73.5 kg (162 lb)   06/28/23 75.1 kg (165 lb 8 oz)   06/15/23 75.5 kg (166 lb 8 oz)     Wt trends this admit  Date/Time Weight Weight Method   04/19/24 0400 71.9 kg (158 lb 8.2 oz) Bed scale   04/17/24 0000 71.5 kg (157 lb 10.1 oz) Bed scale   04/16/24 0000 74.6 kg (164 lb 7.4 oz) Bed scale   04/14/24 0600 73.6 kg (162 lb 4.1 oz) Bed scale   04/13/24 0238 69.4 kg (153 lb) --     Dosing Weight: 69 kg (actual, based on admit wt of 69.4 kg on 4/13)    ASSESSED NUTRITION NEEDS  Estimated Energy Needs: 6681-7613 kcals/day (25 - 30 kcals/kg)  Justification: Maintenance  Estimated Protein Needs:  grams protein/day (1.2 - 1.5 grams of pro/kg)  Justification: Hypercatabolism with critical illness  Estimated Fluid Needs: 1 mL/kcal   Justification: Maintenance or Per provider pending fluid status    PHYSICAL FINDINGS  See malnutrition section below.   -Skin, hair health consistent with/normal for age    MALNUTRITION  % Intake: </= 50% for >/= 5 days (severe)  % Weight Loss: Weight loss does not meet criteria  Subcutaneous Fat Loss: None observed  Muscle Loss: None observed on upper body  Fluid Accumulation/Edema: Does not meet criteria (1+ trace edema)  Malnutrition Diagnosis: Patient does not meet two of the established  criteria necessary for diagnosing malnutrition but is at risk for malnutrition    NUTRITION DIAGNOSIS  Inadequate oral intake related to recent SBO and poor appetite as evidenced by NPO 4/13-4/16 and pt report of lack of appetite at this time.     INTERVENTIONS  Implementation  -Nutrition Education: Provided education on RD role. Encouraged ONS to support kcal/protein intake while struggling with low appetite. Reviewed ONS options.   -Medical food supplement therapy: Ordered as above per pt preference    Goals  Patient to consume % of nutritionally adequate meal trays TID, or the equivalent with supplements/snacks.     Monitoring/Evaluation  Progress toward goals will be monitored and evaluated per protocol.  Iza Juárez RD, LD  Available on Vocera - can search by name or unit Dietitian  **Clinical Nutrition is no longer available via pager

## 2024-04-19 NOTE — PROGRESS NOTES
[facility]    Progress Note - Emergency General Surgery Service       Date of Admission:  4/13/2024  Interval History   Overnight, had event asymptotic A-fib. Pain is controlled. No use of IV/PO dilaudid. Had flatus and bowel movement x4. Deny nausea, vomit, chest pain and shortness of breath.    Physical Exam   Vital Signs: Temp: 97.6  F (36.4  C) Temp src: Oral BP: 121/82 Pulse: 117   Resp: 18 SpO2: 95 % O2 Device: None (Room air) Oxygen Delivery: 2 LPM  Weight: 158 lbs 8.17 oz      Gen: lying in bed comfortably  Resp: non labored breathing on 2L NC  Abd: soft, non distended, non tender, Midline incision c/d/I closed with staples  Ext: warm and well perfused     Assessment & Plan: Surgery     A/P: 89 years female with PMHx KEVYN, CKD, NSTEMI, Afib who is here for closed loop bowel obstruction and is now POD 6 s/p Ex Lap 4/13 and POD 5 s/p abdominal midline wound closure 4/14. Pt extubated on 4/14.     Today, 4/19/2024, afebrile, tachycardia . WBC is within normal limit. Had return of bowel function. patient progress appropriately. Patient is ok to transfer from intermediate care unit to regular floor.    - Continue to monitor Afib - No intervention needed if she remains normotensive with HR < 120   - Soft and bite sized diet (IDDSI 6) with thin liquids.   - Wean O2 as tolerated   - Abx for total of 4 days (4/15-4/18)   - Incentive spirometry   - discontinue IV/PO dilaudid   - SQH for DVT ppx   - Encouraged ambulation  - Plan to discharge to TCU       Data     I have personally reviewed the following data over the past 24 hrs:    7.5  \   10.1 (L)   / 165     142 105 29.8 (H) /  94   3.4 22 2.02 (H) \       The patient's care was discussed with the Chief Resident/Fellow, and the staff attending Dr. Phan.        Matthew García, PGY 1  General Surgery Resident  Pager [phone]    [facility]  All communications  related to this note should be expressed to resident/NAPOLEON on call for this team at the time of your communication.

## 2024-04-19 NOTE — PLAN OF CARE
Goal Outcome Evaluation:      Plan of Care Reviewed With: patient    Overall Patient Progress: improvingOverall Patient Progress: improving    Outcome Evaluation: See RD note 4/19

## 2024-04-20 ENCOUNTER — APPOINTMENT (OUTPATIENT)
Dept: SPEECH THERAPY | Facility: CLINIC | Age: 89
DRG: 329 | End: 2024-04-20
Payer: COMMERCIAL

## 2024-04-20 LAB
ANION GAP SERPL CALCULATED.3IONS-SCNC: 17 MMOL/L (ref 7–15)
BUN SERPL-MCNC: 30.5 MG/DL (ref 8–23)
CALCIUM SERPL-MCNC: 8.8 MG/DL (ref 8.8–10.2)
CHLORIDE SERPL-SCNC: 106 MMOL/L (ref 98–107)
CREAT SERPL-MCNC: 2.16 MG/DL (ref 0.51–0.95)
DEPRECATED HCO3 PLAS-SCNC: 20 MMOL/L (ref 22–29)
EGFRCR SERPLBLD CKD-EPI 2021: 22 ML/MIN/1.73M2
ERYTHROCYTE [DISTWIDTH] IN BLOOD BY AUTOMATED COUNT: 18.8 % (ref 10–15)
GLUCOSE BLDC GLUCOMTR-MCNC: 105 MG/DL (ref 70–99)
GLUCOSE BLDC GLUCOMTR-MCNC: 122 MG/DL (ref 70–99)
GLUCOSE BLDC GLUCOMTR-MCNC: 73 MG/DL (ref 70–99)
GLUCOSE BLDC GLUCOMTR-MCNC: 81 MG/DL (ref 70–99)
GLUCOSE BLDC GLUCOMTR-MCNC: 83 MG/DL (ref 70–99)
GLUCOSE SERPL-MCNC: 98 MG/DL (ref 70–99)
HCT VFR BLD AUTO: 30.9 % (ref 35–47)
HGB BLD-MCNC: 9.6 G/DL (ref 11.7–15.7)
MAGNESIUM SERPL-MCNC: 2 MG/DL (ref 1.7–2.3)
MCH RBC QN AUTO: 27.2 PG (ref 26.5–33)
MCHC RBC AUTO-ENTMCNC: 31.1 G/DL (ref 31.5–36.5)
MCV RBC AUTO: 88 FL (ref 78–100)
PHOSPHATE SERPL-MCNC: 3.1 MG/DL (ref 2.5–4.5)
PLATELET # BLD AUTO: 190 10E3/UL (ref 150–450)
POTASSIUM SERPL-SCNC: 3.5 MMOL/L (ref 3.4–5.3)
RBC # BLD AUTO: 3.53 10E6/UL (ref 3.8–5.2)
SODIUM SERPL-SCNC: 143 MMOL/L (ref 135–145)
WBC # BLD AUTO: 8.4 10E3/UL (ref 4–11)

## 2024-04-20 PROCEDURE — 250N000013 HC RX MED GY IP 250 OP 250 PS 637: Performed by: STUDENT IN AN ORGANIZED HEALTH CARE EDUCATION/TRAINING PROGRAM

## 2024-04-20 PROCEDURE — 83735 ASSAY OF MAGNESIUM: CPT | Performed by: SURGERY

## 2024-04-20 PROCEDURE — 250N000013 HC RX MED GY IP 250 OP 250 PS 637

## 2024-04-20 PROCEDURE — 85027 COMPLETE CBC AUTOMATED: CPT | Performed by: STUDENT IN AN ORGANIZED HEALTH CARE EDUCATION/TRAINING PROGRAM

## 2024-04-20 PROCEDURE — 36415 COLL VENOUS BLD VENIPUNCTURE: CPT | Performed by: STUDENT IN AN ORGANIZED HEALTH CARE EDUCATION/TRAINING PROGRAM

## 2024-04-20 PROCEDURE — 250N000011 HC RX IP 250 OP 636

## 2024-04-20 PROCEDURE — 250N000013 HC RX MED GY IP 250 OP 250 PS 637: Performed by: SURGERY

## 2024-04-20 PROCEDURE — 80048 BASIC METABOLIC PNL TOTAL CA: CPT | Performed by: STUDENT IN AN ORGANIZED HEALTH CARE EDUCATION/TRAINING PROGRAM

## 2024-04-20 PROCEDURE — 92526 ORAL FUNCTION THERAPY: CPT | Mod: GN

## 2024-04-20 PROCEDURE — 120N000002 HC R&B MED SURG/OB UMMC

## 2024-04-20 PROCEDURE — 84100 ASSAY OF PHOSPHORUS: CPT | Performed by: SURGERY

## 2024-04-20 RX ORDER — POTASSIUM CHLORIDE 750 MG/1
10 TABLET, EXTENDED RELEASE ORAL ONCE
Qty: 1 TABLET | Refills: 0 | Status: COMPLETED | OUTPATIENT
Start: 2024-04-20 | End: 2024-04-20

## 2024-04-20 RX ORDER — MAGNESIUM OXIDE 400 MG/1
400 TABLET ORAL EVERY 4 HOURS
Status: COMPLETED | OUTPATIENT
Start: 2024-04-20 | End: 2024-04-20

## 2024-04-20 RX ADMIN — CLOPIDOGREL BISULFATE 75 MG: 75 TABLET ORAL at 08:48

## 2024-04-20 RX ADMIN — PANTOPRAZOLE SODIUM 40 MG: 40 TABLET, DELAYED RELEASE ORAL at 16:22

## 2024-04-20 RX ADMIN — ACETAMINOPHEN 975 MG: 325 TABLET, FILM COATED ORAL at 14:34

## 2024-04-20 RX ADMIN — ROSUVASTATIN CALCIUM 10 MG: 10 TABLET, FILM COATED ORAL at 08:48

## 2024-04-20 RX ADMIN — POTASSIUM CHLORIDE 10 MEQ: 750 TABLET, EXTENDED RELEASE ORAL at 08:50

## 2024-04-20 RX ADMIN — HEPARIN SODIUM 5000 UNITS: 5000 INJECTION, SOLUTION INTRAVENOUS; SUBCUTANEOUS at 02:41

## 2024-04-20 RX ADMIN — HYDROMORPHONE HYDROCHLORIDE 2 MG: 2 TABLET ORAL at 19:27

## 2024-04-20 RX ADMIN — POTASSIUM CHLORIDE 10 MEQ: 750 TABLET, EXTENDED RELEASE ORAL at 12:48

## 2024-04-20 RX ADMIN — PANTOPRAZOLE SODIUM 40 MG: 40 TABLET, DELAYED RELEASE ORAL at 08:48

## 2024-04-20 RX ADMIN — ACETAMINOPHEN 975 MG: 325 TABLET, FILM COATED ORAL at 06:05

## 2024-04-20 RX ADMIN — METOPROLOL TARTRATE 50 MG: 50 TABLET, FILM COATED ORAL at 08:48

## 2024-04-20 RX ADMIN — AMLODIPINE BESYLATE 10 MG: 10 TABLET ORAL at 08:48

## 2024-04-20 RX ADMIN — APIXABAN 2.5 MG: 2.5 TABLET, FILM COATED ORAL at 19:27

## 2024-04-20 RX ADMIN — APIXABAN 2.5 MG: 2.5 TABLET, FILM COATED ORAL at 12:48

## 2024-04-20 RX ADMIN — METOPROLOL TARTRATE 50 MG: 50 TABLET, FILM COATED ORAL at 19:30

## 2024-04-20 RX ADMIN — MAGNESIUM OXIDE TAB 400 MG (241.3 MG ELEMENTAL MG) 400 MG: 400 (241.3 MG) TAB at 12:48

## 2024-04-20 RX ADMIN — LIDOCAINE 4% 1 PATCH: 40 PATCH TOPICAL at 19:28

## 2024-04-20 RX ADMIN — TIMOLOL MALEATE 1 DROP: 5 SOLUTION/ DROPS OPHTHALMIC at 09:44

## 2024-04-20 RX ADMIN — MAGNESIUM OXIDE TAB 400 MG (241.3 MG ELEMENTAL MG) 400 MG: 400 (241.3 MG) TAB at 16:22

## 2024-04-20 ASSESSMENT — ACTIVITIES OF DAILY LIVING (ADL)
ADLS_ACUITY_SCORE: 36

## 2024-04-20 NOTE — PLAN OF CARE
Speech Language Therapy Discharge Summary    Reason for therapy discharge:    All goals and outcomes met, no further needs identified.    Progress towards therapy goal(s). See goals on Care Plan in [facility] electronic health record for goal details.  Goals met    Therapy recommendation(s):    No further therapy is recommended.    Recommend regular solids/thin liquid diet. No ongoing speech therapy warranted. SLP will complete orders and sign off. Please re-consult should new concerns arise.

## 2024-04-20 NOTE — PROGRESS NOTES
[facility]    Progress Note - Emergency General Surgery Service       Date of Admission:  4/13/2024  Interval History   NAEON. Patient continues to tolerate her diet without nausea or vomiting. She is pasing flatus and having Bms. She is ambulating with therapies.    Physical Exam   Vital Signs: Temp: 98.2  F (36.8  C) Temp src: Oral BP: (!) 144/86 Pulse: 100   Resp: 18 SpO2: 96 % O2 Device: None (Room air)    Weight: 158 lbs 8.17 oz      Gen: lying in bed comfortably  Resp: non labored breathing on RA  CV: mildly tachycardic rate, irregular rhythm  Abd: soft, non distended, non tender, Midline incision c/d/I closed with staples  Ext: warm and well perfused       Intake/Output Summary (Last 24 hours) at 4/20/2024 0852  Last data filed at 4/20/2024 0206  Gross per 24 hour   Intake 250 ml   Output 950 ml   Net -700 ml   Drains: None           Assessment & Plan: Surgery     A/P: 89 years female with PMHx EKVYN, CKD, NSTEMI, Afib who is here for closed loop bowel obstruction and is now POD 6 s/p Ex Lap 4/13 and POD 5 s/p abdominal midline wound closure 4/14. Pt extubated on 4/14.     Today, 4/19/2024, afebrile, tachycardia . WBC is within normal limit. Had return of bowel function. patient progress appropriately. Patient is ok to transfer from intermediate care unit to regular floor.    - Continue to monitor Afib - No intervention needed if she remains normotensive with HR < 120   - Soft and bite sized diet (IDDSI 6) with thin liquids.   - Abx completed (4/15-4/18)   - Incentive spirometry   - Resume home meds  - Transition from subcutaneous heparin to her home eliquis today.  - Encouraged ambulation  - Plan to discharge to TCU     Data     I have personally reviewed the following data over the past 24 hrs:    8.4  \   9.6 (L)   / 190     143 106 30.5 (H) /  98   3.5 20 (L) 2.16 (H) \       Imaging results reviewed over the past 24 hrs:   No results found for this or  "any previous visit (from the past 24 hour(s)).  Procedure(s):  Abdominal Re-Exploration and Closure on * No dates entered *  Clinically Significant Risk Factors              # Hypoalbuminemia: Lowest albumin = 2.9 g/dL at 4/13/2024  2:31 PM, will monitor as appropriate     # Hypertension: Noted on problem list  # Chronic heart failure with preserved ejection fraction: heart failure noted on problem list and last echo with EF >50%       # Overweight: Estimated body mass index is 25.58 kg/m  as calculated from the following:    Height as of this encounter: 1.676 m (5' 6\").    Weight as of this encounter: 71.9 kg (158 lb 8.2 oz).      # Financial/Environmental Concerns: none   # Pacemaker present       Disposition Plan     Expected Discharge Date: 04/22/2024      Destination: home with family           The patient's care was discussed with the Chief Resident/Fellow.       Ashu Robledo MD  [facility]  All communications related to this note should be expressed to resident/NAPOLEON on call for this team at the time of your communication.    "

## 2024-04-20 NOTE — PLAN OF CARE
Goal Outcome Evaluation:       Temp: 97.7  F (36.5  C) Temp src: Oral BP: (!) 148/95 Pulse: 109   Resp: 18 SpO2: 97 % O2 Device: None (Room air)      Time: 9311-4885    Neuro: Alert and Oriented  x4, let needs known  Cardiac: Hypertensive within parameters and at baseline, denies cardiac chest pain, asymptomatic; tachycardic can be between 110-130; had a run of what appeared to be Vtach; tele clarified this was not the case as it was only fast afib  Respiratory: LS clear and dim on room air; consistently with O2 sats mid 90's  GI/: Up voiding urine to commode overnight; last BM 4-20; left a physician note for an order of metamucil  Diet/Appetite: soft and bite sized, denies nausea  Skin: bruising scattered throughout; midline incision closed with staples and open to air; redness and some skin breakdown to pannus; using barrier cream   LDA: L PIV SL x2  Activity: Up 1A GB + Walker; can amb to bathroom or bedside commode  Pain: well managed; lido patch on abdomen  Plan: continue plan of care

## 2024-04-20 NOTE — PLAN OF CARE
Goal Outcome Evaluation:  Alert and oriented x4. Lungs clear. Tolerating regular diet. Rates abdominal pain 2-4/10. Scheduled Tylenol given. Up to commode with good U/O and BM. Up with assist of two, gait belt and walker. Midline lower abdominal incision CDI. Pt in NSR as of 1045 this AM. Mag and K+ replaced. Family at bedside.

## 2024-04-20 NOTE — PLAN OF CARE
"Goal Outcome Evaluation:      Plan of Care Reviewed With: patient    A&Ox4. On RA. A-fib, HR 110s-120s. Abdominal pain managed with PRN dilaudid x1 and PRN robaxin x1. Midline abdominal incision closed with staples SUKHDEEP. Scattered bruising, skin breakdown/redness to pannus- skin barrier applied, lip abrasion-Vaseline applied. Voiding spontaneously, BM x1 this shift, passing flatus, Denies nausea. Tolerating soft & bite sized diet. Up to commode with assist x1, gait belt and walker. No electrolyte replacements needed, recheck tomorrow AM. Continue with POC.        Vitals: BP (!) 136/93   Pulse 109   Temp 97.4  F (36.3  C) (Oral)   Resp 18   Ht 1.676 m (5' 6\")   Wt 71.9 kg (158 lb 8.2 oz)   SpO2 97%   BMI 25.58 kg/m      "

## 2024-04-20 NOTE — PROGRESS NOTES
Care Management Follow Up    Length of Stay (days): 7    Expected Discharge Date: 04/22/2024     Concerns to be Addressed: discharge planning     Patient plan of care discussed at interdisciplinary rounds: Yes    Anticipated Discharge Disposition:  TCU/SNF     Anticipated Discharge Services:  Post-acute referrals  Anticipated Discharge DME:  TBD    Patient/family educated on Medicare website which has current facility and service quality ratings:  Yes-List provided of Medicare certified skilled nursing facilities  Education Provided on the Discharge Plan:  Yes  Patient/Family in Agreement with the Plan:  Yes    Referrals Placed by CM/SW:    Referrals made via Epic to:    [facility] (1st choice)  [facility]    Private pay costs discussed: Not applicable    Additional Information:  Met with the patient, , and Son in order to discuss the discharge planning process. Patient reports need for placement in a skilled nursing facility and hoping to discharge on Monday.     [facility] and her  was recently at [facility].     They report being okay with making referrals to facilities in the [facility], and [place] area and were agreeable to facilities with 3 stars or higher.    SW will need to follow up on Monday to see if facilities have beds available.    Uma Fallon, [facility] 4415  4/20/2024       Social Work and Care Management Department       SEARCHABLE in [facility] - search SOCIAL WORK       [place] (1600 - 1430) Saturday and Sunday     Units: 4A Vocera, 4C Vocera, & 4E Vocera    Pager: [phone]     Units: 5A 5582-5742 Vocera, 5A 2664-4994 Vocera , BMT SW 1 BMT SW 2, BMT SW 3 & BMT SW 4  5C Off Service 2724 - 8249  5C Off Service [id number] Pager: [phone]     Units: 6A Vocera & 6B Vocera  Pager:  [phone]     Units: 6C Vocera Pager: [phone]     Units: 7A Vocera & 7B Vocera  Pager: [phone]     Units: 7C Med Surg 7401 thru 7418 and 7C Med Surg 7502 thru 7521  Pager: [phone]     Unit: [facility] ED Vocera & [facility] Obs Vocera Pager: [phone]      [facility] (2596-0659) Saturday and Sunday      Units: 5 Ortho Vocera, 5 Med Surg Vocera & WB ED Vocera Pager:[phone]     Units: 6 Med Surg Vocera, 8 Med Surg Vocera, & 10 ICU Vocera Pager: [phone]        After hours Vocera [facility] and After Hours Vocera [facility]     Saturday & Sunday (1630 - 0000)    Mon-Fri (7108-2307)     [facility] Recognized Holidays  ([phone])    Units: ALL  Pager: [phone]

## 2024-04-21 LAB
ANION GAP SERPL CALCULATED.3IONS-SCNC: 14 MMOL/L (ref 7–15)
BUN SERPL-MCNC: 27.8 MG/DL (ref 8–23)
CALCIUM SERPL-MCNC: 8.8 MG/DL (ref 8.8–10.2)
CHLORIDE SERPL-SCNC: 106 MMOL/L (ref 98–107)
CREAT SERPL-MCNC: 2.11 MG/DL (ref 0.51–0.95)
DEPRECATED HCO3 PLAS-SCNC: 23 MMOL/L (ref 22–29)
EGFRCR SERPLBLD CKD-EPI 2021: 22 ML/MIN/1.73M2
ERYTHROCYTE [DISTWIDTH] IN BLOOD BY AUTOMATED COUNT: 18.8 % (ref 10–15)
GLUCOSE BLDC GLUCOMTR-MCNC: 143 MG/DL (ref 70–99)
GLUCOSE BLDC GLUCOMTR-MCNC: 74 MG/DL (ref 70–99)
GLUCOSE SERPL-MCNC: 115 MG/DL (ref 70–99)
HCT VFR BLD AUTO: 30.1 % (ref 35–47)
HGB BLD-MCNC: 8.9 G/DL (ref 11.7–15.7)
MAGNESIUM SERPL-MCNC: 2 MG/DL (ref 1.7–2.3)
MCH RBC QN AUTO: 26.6 PG (ref 26.5–33)
MCHC RBC AUTO-ENTMCNC: 29.6 G/DL (ref 31.5–36.5)
MCV RBC AUTO: 90 FL (ref 78–100)
PHOSPHATE SERPL-MCNC: 3.3 MG/DL (ref 2.5–4.5)
PLATELET # BLD AUTO: 202 10E3/UL (ref 150–450)
POTASSIUM SERPL-SCNC: 3.6 MMOL/L (ref 3.4–5.3)
RBC # BLD AUTO: 3.35 10E6/UL (ref 3.8–5.2)
SODIUM SERPL-SCNC: 143 MMOL/L (ref 135–145)
WBC # BLD AUTO: 9.6 10E3/UL (ref 4–11)

## 2024-04-21 PROCEDURE — 250N000013 HC RX MED GY IP 250 OP 250 PS 637

## 2024-04-21 PROCEDURE — 80048 BASIC METABOLIC PNL TOTAL CA: CPT | Performed by: STUDENT IN AN ORGANIZED HEALTH CARE EDUCATION/TRAINING PROGRAM

## 2024-04-21 PROCEDURE — 120N000002 HC R&B MED SURG/OB UMMC

## 2024-04-21 PROCEDURE — 250N000013 HC RX MED GY IP 250 OP 250 PS 637: Performed by: STUDENT IN AN ORGANIZED HEALTH CARE EDUCATION/TRAINING PROGRAM

## 2024-04-21 PROCEDURE — 85027 COMPLETE CBC AUTOMATED: CPT | Performed by: STUDENT IN AN ORGANIZED HEALTH CARE EDUCATION/TRAINING PROGRAM

## 2024-04-21 PROCEDURE — 84100 ASSAY OF PHOSPHORUS: CPT | Performed by: SURGERY

## 2024-04-21 PROCEDURE — 36415 COLL VENOUS BLD VENIPUNCTURE: CPT | Performed by: STUDENT IN AN ORGANIZED HEALTH CARE EDUCATION/TRAINING PROGRAM

## 2024-04-21 PROCEDURE — 250N000011 HC RX IP 250 OP 636: Mod: JZ | Performed by: STUDENT IN AN ORGANIZED HEALTH CARE EDUCATION/TRAINING PROGRAM

## 2024-04-21 PROCEDURE — 250N000013 HC RX MED GY IP 250 OP 250 PS 637: Performed by: SURGERY

## 2024-04-21 PROCEDURE — 83735 ASSAY OF MAGNESIUM: CPT | Performed by: SURGERY

## 2024-04-21 RX ORDER — MAGNESIUM OXIDE 400 MG/1
400 TABLET ORAL EVERY 4 HOURS
Status: COMPLETED | OUTPATIENT
Start: 2024-04-21 | End: 2024-04-21

## 2024-04-21 RX ORDER — POTASSIUM CHLORIDE 750 MG/1
10 TABLET, EXTENDED RELEASE ORAL ONCE
Status: COMPLETED | OUTPATIENT
Start: 2024-04-21 | End: 2024-04-21

## 2024-04-21 RX ADMIN — METHOCARBAMOL 500 MG: 500 TABLET ORAL at 07:47

## 2024-04-21 RX ADMIN — HYDROMORPHONE HYDROCHLORIDE 2 MG: 2 TABLET ORAL at 10:15

## 2024-04-21 RX ADMIN — LABETALOL HYDROCHLORIDE 10 MG: 5 INJECTION, SOLUTION INTRAVENOUS at 00:43

## 2024-04-21 RX ADMIN — ACETAMINOPHEN 975 MG: 325 TABLET, FILM COATED ORAL at 15:09

## 2024-04-21 RX ADMIN — HYDRALAZINE HYDROCHLORIDE 10 MG: 20 INJECTION INTRAMUSCULAR; INTRAVENOUS at 08:07

## 2024-04-21 RX ADMIN — POTASSIUM CHLORIDE 10 MEQ: 750 TABLET, EXTENDED RELEASE ORAL at 07:49

## 2024-04-21 RX ADMIN — CLOPIDOGREL BISULFATE 75 MG: 75 TABLET ORAL at 07:48

## 2024-04-21 RX ADMIN — METOPROLOL TARTRATE 50 MG: 50 TABLET, FILM COATED ORAL at 20:00

## 2024-04-21 RX ADMIN — APIXABAN 2.5 MG: 2.5 TABLET, FILM COATED ORAL at 20:00

## 2024-04-21 RX ADMIN — MAGNESIUM OXIDE TAB 400 MG (241.3 MG ELEMENTAL MG) 400 MG: 400 (241.3 MG) TAB at 10:15

## 2024-04-21 RX ADMIN — TIMOLOL MALEATE 1 DROP: 5 SOLUTION/ DROPS OPHTHALMIC at 07:49

## 2024-04-21 RX ADMIN — ROSUVASTATIN CALCIUM 10 MG: 10 TABLET, FILM COATED ORAL at 07:48

## 2024-04-21 RX ADMIN — HYDROMORPHONE HYDROCHLORIDE 2 MG: 2 TABLET ORAL at 18:08

## 2024-04-21 RX ADMIN — APIXABAN 2.5 MG: 2.5 TABLET, FILM COATED ORAL at 07:48

## 2024-04-21 RX ADMIN — ACETAMINOPHEN 975 MG: 325 TABLET, FILM COATED ORAL at 23:33

## 2024-04-21 RX ADMIN — ACETAMINOPHEN 975 MG: 325 TABLET, FILM COATED ORAL at 06:50

## 2024-04-21 RX ADMIN — PANTOPRAZOLE SODIUM 40 MG: 40 TABLET, DELAYED RELEASE ORAL at 07:47

## 2024-04-21 RX ADMIN — MAGNESIUM OXIDE TAB 400 MG (241.3 MG ELEMENTAL MG) 400 MG: 400 (241.3 MG) TAB at 15:09

## 2024-04-21 RX ADMIN — HYDROMORPHONE HYDROCHLORIDE 2 MG: 2 TABLET ORAL at 23:33

## 2024-04-21 RX ADMIN — HYDROMORPHONE HYDROCHLORIDE 2 MG: 2 TABLET ORAL at 13:07

## 2024-04-21 RX ADMIN — PANTOPRAZOLE SODIUM 40 MG: 40 TABLET, DELAYED RELEASE ORAL at 18:07

## 2024-04-21 RX ADMIN — LIDOCAINE 4% 1 PATCH: 40 PATCH TOPICAL at 20:00

## 2024-04-21 RX ADMIN — POTASSIUM CHLORIDE 10 MEQ: 750 TABLET, EXTENDED RELEASE ORAL at 12:54

## 2024-04-21 RX ADMIN — AMLODIPINE BESYLATE 10 MG: 10 TABLET ORAL at 07:47

## 2024-04-21 RX ADMIN — HYDROMORPHONE HYDROCHLORIDE 2 MG: 2 TABLET ORAL at 05:09

## 2024-04-21 RX ADMIN — METOPROLOL TARTRATE 50 MG: 50 TABLET, FILM COATED ORAL at 06:50

## 2024-04-21 ASSESSMENT — ACTIVITIES OF DAILY LIVING (ADL)
ADLS_ACUITY_SCORE: 36

## 2024-04-21 NOTE — PLAN OF CARE
Goal Outcome Evaluation:       Temp: 98.1  F (36.7  C) Temp src: Oral BP: (!) 144/57 Pulse: 63   Resp: 18 SpO2: 94 % O2 Device: None (Room air)      Time: 7699-1172    Neuro: Alert and Oriented  x4, let needs known  Cardiac: Hypertensive intermittently throughout shift; Gave labetalol x1 at 0045; The BP came down and then continued to fluctuate throughout the night; this made it difficult to determine need for PRN antihypertensive medication; denies cardiac chest pain, asymptomatic; remained NSR throughout the shift  Respiratory: LS clear and dim on room air; consistently with O2 sats mid 90's  GI/: Up voiding urine to commode overnight; last BM 4-21  Diet/Appetite: soft and bite sized, denies nausea  Skin: bruising scattered throughout; midline incision closed with staples and open to air; redness and some skin breakdown to pannus; using barrier cream   LDA: L PIV SL x2  Activity: Up 1A GB + Walker; can amb to bathroom or bedside commode  Pain: well managed; lido patch on abdomen; PO dilaudid given later in shift  Plan: continue plan of care

## 2024-04-21 NOTE — PLAN OF CARE
"Goal Outcome Evaluation:      Plan of Care Reviewed With: patient    A&Ox4. On RA. Cardiac monitoring- normal SR, HTN but within parameters-scheduled metoprolol given. Abdominal pain managed with PRN dilaudid x1. Midline abdominal incision closed with staples SUKHDEEP. Scattered bruising, skin breakdown/redness to pannus- skin barrier applied. Voiding spontaneously, BM x1 this shift, passing flatus, Denies nausea. Tolerating soft & bite sized diet. Up to commode with assist x1, gait belt and walker. Lt PIV x2, both SL. Awaiting TCU placement. Continue with POC.     Vitals: BP (!) 157/62 (BP Location: Right arm)   Pulse 66   Temp 97.3  F (36.3  C) (Oral)   Resp 16   Ht 1.676 m (5' 6\")   Wt 71.9 kg (158 lb 8.2 oz)   SpO2 96%   BMI 25.58 kg/m      "

## 2024-04-21 NOTE — PLAN OF CARE
Goal Outcome Evaluation:  Alert and oriented x4. Up to commode with assist of 1, gait belt and walker. Voiding without difficulty in good amounts. Had BM today and passing gas. Abdomen rounded, soft with faint yellow bruising around midline incision. Pt rating abdominal pain 5/10 and states that Robaxin is not helpful. Dilaudid 2mg tablet given with improvement. Hot pain applied with some relief. Lungs clear, diminished in bases. O2 sats 96% on RA. Diet changed to Regular and patient eating about 50% of tray. Family here today x2 different sons. Bg 115 and 143. Mag and Phos replaced. B/P 165/65 this AM and Apresoline given. B/P within parameters the rest of shift.

## 2024-04-21 NOTE — PROGRESS NOTES
[facility]    Progress Note - Emergency General Surgery Service       Date of Admission:  4/13/2024  Interval History   Events:NAEON  Pt denies any uncontrolled pain. Tolerating regular diet. No N/V. Ambulating without issues. Passing BMs.     Physical Exam   Vital Signs: Temp: 98  F (36.7  C) Temp src: Axillary BP: (!) 165/65 Pulse: 64   Resp: 18 SpO2: 95 % O2 Device: None (Room air)    Weight: 158 lbs 8.17 oz      Gen: lying in bed comfortably  Resp: non labored breathing on RA  CV: regular rhythm, normal HR   Abd: soft, non distended, non tender, Midline incision c/d/I closed with staples  Ext: warm and well perfused     Assessment & Plan: Surgery     A/P: 89 years female with PMHx KEVYN, CKD, NSTEMI, Afib who is here for closed loop bowel obstruction and is now POD 6 s/p Ex Lap 4/13 and POD 5 s/p abdominal midline wound closure 4/14. Pt extubated on 4/14.     Pt meeting criteria for discharge pending TCU placement. Continues to pass BMs, tolerate diet, pain controlled and ambulating without issues.     - Continue to monitor Afib - No intervention needed if she remains normotensive with HR < 120   - Regular diet   - Abx completed (4/15-4/18)   - Incentive spirometry   - Home meds resumed   - Continue PTA Eliquis 2.5 mg daily   - Encouraged ambulation  - Plan to discharge to TCU     Data     I have personally reviewed the following data over the past 24 hrs:    9.6  \   8.9 (L)   / 202     143 106 27.8 (H) /  115 (H)   3.6 23 2.11 (H) \       The patient's care was discussed with the Attending Physician, Dr. Phan and Chief Resident/Fellow.       Vanessa Harper MD  [facility]  All communications related to this note should be expressed to resident/NAPOLEON on call for this team at the time of your communication.

## 2024-04-22 ENCOUNTER — APPOINTMENT (OUTPATIENT)
Dept: OCCUPATIONAL THERAPY | Facility: CLINIC | Age: 89
DRG: 329 | End: 2024-04-22
Payer: COMMERCIAL

## 2024-04-22 VITALS
BODY MASS INDEX: 25.47 KG/M2 | TEMPERATURE: 98.1 F | RESPIRATION RATE: 18 BRPM | WEIGHT: 158.51 LBS | HEART RATE: 69 BPM | OXYGEN SATURATION: 94 % | HEIGHT: 66 IN | DIASTOLIC BLOOD PRESSURE: 62 MMHG | SYSTOLIC BLOOD PRESSURE: 154 MMHG

## 2024-04-22 LAB
ANION GAP SERPL CALCULATED.3IONS-SCNC: 11 MMOL/L (ref 7–15)
BUN SERPL-MCNC: 25.4 MG/DL (ref 8–23)
CALCIUM SERPL-MCNC: 8.6 MG/DL (ref 8.8–10.2)
CHLORIDE SERPL-SCNC: 109 MMOL/L (ref 98–107)
CREAT SERPL-MCNC: 2 MG/DL (ref 0.51–0.95)
DEPRECATED HCO3 PLAS-SCNC: 22 MMOL/L (ref 22–29)
EGFRCR SERPLBLD CKD-EPI 2021: 24 ML/MIN/1.73M2
ERYTHROCYTE [DISTWIDTH] IN BLOOD BY AUTOMATED COUNT: 19.6 % (ref 10–15)
GLUCOSE SERPL-MCNC: 88 MG/DL (ref 70–99)
HCT VFR BLD AUTO: 30 % (ref 35–47)
HGB BLD-MCNC: 8.9 G/DL (ref 11.7–15.7)
MAGNESIUM SERPL-MCNC: 2 MG/DL (ref 1.7–2.3)
MCH RBC QN AUTO: 27.8 PG (ref 26.5–33)
MCHC RBC AUTO-ENTMCNC: 29.7 G/DL (ref 31.5–36.5)
MCV RBC AUTO: 94 FL (ref 78–100)
MDC_IDC_LEAD_CONNECTION_STATUS: NORMAL
MDC_IDC_LEAD_CONNECTION_STATUS: NORMAL
MDC_IDC_LEAD_IMPLANT_DT: NORMAL
MDC_IDC_LEAD_IMPLANT_DT: NORMAL
MDC_IDC_LEAD_LOCATION: NORMAL
MDC_IDC_LEAD_LOCATION: NORMAL
MDC_IDC_LEAD_LOCATION_DETAIL_1: NORMAL
MDC_IDC_LEAD_LOCATION_DETAIL_1: NORMAL
MDC_IDC_LEAD_MFG: NORMAL
MDC_IDC_LEAD_MFG: NORMAL
MDC_IDC_LEAD_MODEL: NORMAL
MDC_IDC_LEAD_MODEL: NORMAL
MDC_IDC_LEAD_POLARITY_TYPE: NORMAL
MDC_IDC_LEAD_POLARITY_TYPE: NORMAL
MDC_IDC_LEAD_SERIAL: NORMAL
MDC_IDC_LEAD_SERIAL: NORMAL
MDC_IDC_LEAD_SPECIAL_FUNCTION: NORMAL
MDC_IDC_LEAD_SPECIAL_FUNCTION: NORMAL
MDC_IDC_MSMT_BATTERY_DTM: NORMAL
MDC_IDC_MSMT_BATTERY_REMAINING_LONGEVITY: 109 MO
MDC_IDC_MSMT_BATTERY_RRT_TRIGGER: 2.62
MDC_IDC_MSMT_BATTERY_STATUS: NORMAL
MDC_IDC_MSMT_BATTERY_VOLTAGE: 3 V
MDC_IDC_MSMT_LEADCHNL_RA_IMPEDANCE_VALUE: 304 OHM
MDC_IDC_MSMT_LEADCHNL_RA_IMPEDANCE_VALUE: 342 OHM
MDC_IDC_MSMT_LEADCHNL_RA_PACING_THRESHOLD_AMPLITUDE: 0.62 V
MDC_IDC_MSMT_LEADCHNL_RA_PACING_THRESHOLD_PULSEWIDTH: 0.4 MS
MDC_IDC_MSMT_LEADCHNL_RA_SENSING_INTR_AMPL: 2.12 MV
MDC_IDC_MSMT_LEADCHNL_RV_IMPEDANCE_VALUE: 399 OHM
MDC_IDC_MSMT_LEADCHNL_RV_IMPEDANCE_VALUE: 418 OHM
MDC_IDC_MSMT_LEADCHNL_RV_PACING_THRESHOLD_AMPLITUDE: 1.12 V
MDC_IDC_MSMT_LEADCHNL_RV_PACING_THRESHOLD_PULSEWIDTH: 0.4 MS
MDC_IDC_MSMT_LEADCHNL_RV_SENSING_INTR_AMPL: 8.88 MV
MDC_IDC_PG_IMPLANT_DTM: NORMAL
MDC_IDC_PG_MFG: NORMAL
MDC_IDC_PG_MODEL: NORMAL
MDC_IDC_PG_SERIAL: NORMAL
MDC_IDC_PG_TYPE: NORMAL
MDC_IDC_SESS_CLINIC_NAME: NORMAL
MDC_IDC_SESS_DTM: NORMAL
MDC_IDC_SESS_TYPE: NORMAL
MDC_IDC_SET_BRADY_AT_MODE_SWITCH_RATE: 171 {BEATS}/MIN
MDC_IDC_SET_BRADY_HYSTRATE: NORMAL
MDC_IDC_SET_BRADY_LOWRATE: 60 {BEATS}/MIN
MDC_IDC_SET_BRADY_MAX_SENSOR_RATE: 130 {BEATS}/MIN
MDC_IDC_SET_BRADY_MAX_TRACKING_RATE: 130 {BEATS}/MIN
MDC_IDC_SET_BRADY_MODE: NORMAL
MDC_IDC_SET_BRADY_PAV_DELAY_LOW: 180 MS
MDC_IDC_SET_BRADY_SAV_DELAY_LOW: 150 MS
MDC_IDC_SET_LEADCHNL_RA_PACING_AMPLITUDE: 1.5 V
MDC_IDC_SET_LEADCHNL_RA_PACING_ANODE_ELECTRODE_1: NORMAL
MDC_IDC_SET_LEADCHNL_RA_PACING_ANODE_LOCATION_1: NORMAL
MDC_IDC_SET_LEADCHNL_RA_PACING_CAPTURE_MODE: NORMAL
MDC_IDC_SET_LEADCHNL_RA_PACING_CATHODE_ELECTRODE_1: NORMAL
MDC_IDC_SET_LEADCHNL_RA_PACING_CATHODE_LOCATION_1: NORMAL
MDC_IDC_SET_LEADCHNL_RA_PACING_POLARITY: NORMAL
MDC_IDC_SET_LEADCHNL_RA_PACING_PULSEWIDTH: 0.4 MS
MDC_IDC_SET_LEADCHNL_RA_SENSING_ANODE_ELECTRODE_1: NORMAL
MDC_IDC_SET_LEADCHNL_RA_SENSING_ANODE_LOCATION_1: NORMAL
MDC_IDC_SET_LEADCHNL_RA_SENSING_CATHODE_ELECTRODE_1: NORMAL
MDC_IDC_SET_LEADCHNL_RA_SENSING_CATHODE_LOCATION_1: NORMAL
MDC_IDC_SET_LEADCHNL_RA_SENSING_POLARITY: NORMAL
MDC_IDC_SET_LEADCHNL_RA_SENSING_SENSITIVITY: 0.3 MV
MDC_IDC_SET_LEADCHNL_RV_PACING_AMPLITUDE: 2.5 V
MDC_IDC_SET_LEADCHNL_RV_PACING_ANODE_ELECTRODE_1: NORMAL
MDC_IDC_SET_LEADCHNL_RV_PACING_ANODE_LOCATION_1: NORMAL
MDC_IDC_SET_LEADCHNL_RV_PACING_CAPTURE_MODE: NORMAL
MDC_IDC_SET_LEADCHNL_RV_PACING_CATHODE_ELECTRODE_1: NORMAL
MDC_IDC_SET_LEADCHNL_RV_PACING_CATHODE_LOCATION_1: NORMAL
MDC_IDC_SET_LEADCHNL_RV_PACING_POLARITY: NORMAL
MDC_IDC_SET_LEADCHNL_RV_PACING_PULSEWIDTH: 0.4 MS
MDC_IDC_SET_LEADCHNL_RV_SENSING_ANODE_ELECTRODE_1: NORMAL
MDC_IDC_SET_LEADCHNL_RV_SENSING_ANODE_LOCATION_1: NORMAL
MDC_IDC_SET_LEADCHNL_RV_SENSING_CATHODE_ELECTRODE_1: NORMAL
MDC_IDC_SET_LEADCHNL_RV_SENSING_CATHODE_LOCATION_1: NORMAL
MDC_IDC_SET_LEADCHNL_RV_SENSING_POLARITY: NORMAL
MDC_IDC_SET_LEADCHNL_RV_SENSING_SENSITIVITY: 0.9 MV
MDC_IDC_SET_ZONE_DETECTION_INTERVAL: 350 MS
MDC_IDC_SET_ZONE_DETECTION_INTERVAL: 400 MS
MDC_IDC_SET_ZONE_STATUS: NORMAL
MDC_IDC_SET_ZONE_STATUS: NORMAL
MDC_IDC_SET_ZONE_TYPE: NORMAL
MDC_IDC_SET_ZONE_VENDOR_TYPE: NORMAL
MDC_IDC_STAT_AT_BURDEN_PERCENT: 0 %
MDC_IDC_STAT_AT_DTM_END: NORMAL
MDC_IDC_STAT_AT_DTM_START: NORMAL
MDC_IDC_STAT_BRADY_AP_VP_PERCENT: 0.04 %
MDC_IDC_STAT_BRADY_AP_VS_PERCENT: 66.66 %
MDC_IDC_STAT_BRADY_AS_VP_PERCENT: 0.03 %
MDC_IDC_STAT_BRADY_AS_VS_PERCENT: 33.26 %
MDC_IDC_STAT_BRADY_DTM_END: NORMAL
MDC_IDC_STAT_BRADY_DTM_START: NORMAL
MDC_IDC_STAT_BRADY_RA_PERCENT_PACED: 67.96 %
MDC_IDC_STAT_BRADY_RV_PERCENT_PACED: 0.08 %
MDC_IDC_STAT_EPISODE_RECENT_COUNT: 0
MDC_IDC_STAT_EPISODE_RECENT_COUNT_DTM_END: NORMAL
MDC_IDC_STAT_EPISODE_RECENT_COUNT_DTM_START: NORMAL
MDC_IDC_STAT_EPISODE_TOTAL_COUNT: 0
MDC_IDC_STAT_EPISODE_TOTAL_COUNT: 7
MDC_IDC_STAT_EPISODE_TOTAL_COUNT_DTM_END: NORMAL
MDC_IDC_STAT_EPISODE_TOTAL_COUNT_DTM_START: NORMAL
MDC_IDC_STAT_EPISODE_TYPE: NORMAL
MDC_IDC_STAT_TACHYTHERAPY_RECENT_DTM_END: NORMAL
MDC_IDC_STAT_TACHYTHERAPY_RECENT_DTM_START: NORMAL
MDC_IDC_STAT_TACHYTHERAPY_TOTAL_DTM_END: NORMAL
MDC_IDC_STAT_TACHYTHERAPY_TOTAL_DTM_START: NORMAL
PHOSPHATE SERPL-MCNC: 3.3 MG/DL (ref 2.5–4.5)
PLATELET # BLD AUTO: 162 10E3/UL (ref 150–450)
POTASSIUM SERPL-SCNC: 4.5 MMOL/L (ref 3.4–5.3)
RBC # BLD AUTO: 3.2 10E6/UL (ref 3.8–5.2)
SODIUM SERPL-SCNC: 142 MMOL/L (ref 135–145)
WBC # BLD AUTO: 8.8 10E3/UL (ref 4–11)

## 2024-04-22 PROCEDURE — 36415 COLL VENOUS BLD VENIPUNCTURE: CPT | Performed by: STUDENT IN AN ORGANIZED HEALTH CARE EDUCATION/TRAINING PROGRAM

## 2024-04-22 PROCEDURE — 85014 HEMATOCRIT: CPT | Performed by: STUDENT IN AN ORGANIZED HEALTH CARE EDUCATION/TRAINING PROGRAM

## 2024-04-22 PROCEDURE — 97530 THERAPEUTIC ACTIVITIES: CPT | Mod: GO

## 2024-04-22 PROCEDURE — 250N000013 HC RX MED GY IP 250 OP 250 PS 637: Performed by: SURGERY

## 2024-04-22 PROCEDURE — 250N000013 HC RX MED GY IP 250 OP 250 PS 637

## 2024-04-22 PROCEDURE — 250N000013 HC RX MED GY IP 250 OP 250 PS 637: Performed by: STUDENT IN AN ORGANIZED HEALTH CARE EDUCATION/TRAINING PROGRAM

## 2024-04-22 PROCEDURE — 97535 SELF CARE MNGMENT TRAINING: CPT | Mod: GO

## 2024-04-22 PROCEDURE — 80048 BASIC METABOLIC PNL TOTAL CA: CPT | Performed by: STUDENT IN AN ORGANIZED HEALTH CARE EDUCATION/TRAINING PROGRAM

## 2024-04-22 PROCEDURE — 84100 ASSAY OF PHOSPHORUS: CPT | Performed by: SURGERY

## 2024-04-22 PROCEDURE — 83735 ASSAY OF MAGNESIUM: CPT | Performed by: SURGERY

## 2024-04-22 RX ORDER — HYDROMORPHONE HYDROCHLORIDE 2 MG/1
2 TABLET ORAL
Status: DISCONTINUED | OUTPATIENT
Start: 2024-04-22 | End: 2024-04-22 | Stop reason: HOSPADM

## 2024-04-22 RX ORDER — HYDROMORPHONE HYDROCHLORIDE 2 MG/1
2 TABLET ORAL
Qty: 10 TABLET | Refills: 0 | Status: ON HOLD | OUTPATIENT
Start: 2024-04-22 | End: 2024-05-03

## 2024-04-22 RX ORDER — METHOCARBAMOL 500 MG/1
500 TABLET, FILM COATED ORAL EVERY 6 HOURS PRN
Qty: 30 TABLET | Refills: 0 | Status: ON HOLD | OUTPATIENT
Start: 2024-04-22 | End: 2024-05-03

## 2024-04-22 RX ADMIN — ROSUVASTATIN CALCIUM 10 MG: 10 TABLET, FILM COATED ORAL at 08:17

## 2024-04-22 RX ADMIN — PANTOPRAZOLE SODIUM 40 MG: 40 TABLET, DELAYED RELEASE ORAL at 08:17

## 2024-04-22 RX ADMIN — METOPROLOL TARTRATE 50 MG: 50 TABLET, FILM COATED ORAL at 08:17

## 2024-04-22 RX ADMIN — LIDOCAINE 4% 1 PATCH: 40 PATCH TOPICAL at 08:19

## 2024-04-22 RX ADMIN — CLOPIDOGREL BISULFATE 75 MG: 75 TABLET ORAL at 08:17

## 2024-04-22 RX ADMIN — POTASSIUM CHLORIDE 10 MEQ: 750 TABLET, EXTENDED RELEASE ORAL at 08:17

## 2024-04-22 RX ADMIN — METHOCARBAMOL 500 MG: 500 TABLET ORAL at 08:17

## 2024-04-22 RX ADMIN — AMLODIPINE BESYLATE 10 MG: 10 TABLET ORAL at 08:17

## 2024-04-22 RX ADMIN — TIMOLOL MALEATE 1 DROP: 5 SOLUTION/ DROPS OPHTHALMIC at 08:18

## 2024-04-22 RX ADMIN — HYDROMORPHONE HYDROCHLORIDE 2 MG: 2 TABLET ORAL at 10:12

## 2024-04-22 RX ADMIN — APIXABAN 2.5 MG: 2.5 TABLET, FILM COATED ORAL at 08:17

## 2024-04-22 ASSESSMENT — ACTIVITIES OF DAILY LIVING (ADL)
ADLS_ACUITY_SCORE: 36

## 2024-04-22 NOTE — PLAN OF CARE
Occupational Therapy Discharge Summary    Reason for therapy discharge:    Discharged to transitional care facility.    Progress towards therapy goal(s). See goals on Care Plan in [facility] electronic health record for goal details.  Goals partially met.  Barriers to achieving goals:   discharge from facility.    Therapy recommendation(s):    Continued therapy is recommended.  Rationale/Recommendations:  to maximize strength, ax tolerance, safety, and ADL IND prior to return home.

## 2024-04-22 NOTE — PLAN OF CARE
Goal Outcome Evaluation:    NEURO: Alert and oriented x4   RESPIRATORY: On room air, able to make needs known  CARDIAC: WDL   GI/: 1 BM today. Voiding not saving.   DIET: Regular diet, poor appetite   PAIN/NAUSEA: 5/10 incisional pain, prn  dilaudid given x1.   INCISION/DRAINS: Abdominal incision, open to air with stables.   IV ACCESS: PIV SL, removed upon discharged.   ACTIVITY: A1 with gait belt and walker.   LAB: On high potassium and mag protocol   PLAN:  Patient discharging to TCU. Ride is set to come at 1pm. PIV removed upon discharged AVS reviewed with patient. RN to RN report given to facility nurse.

## 2024-04-22 NOTE — CARE PLAN
"Vitals: Blood pressure 136/61, pulse 66, temperature 98.1  F (36.7  C), temperature source Oral, resp. rate 18, height 1.676 m (5' 6\"), weight 71.9 kg (158 lb 8.2 oz), SpO2 96%, not currently breastfeeding.  Time: 5347-7853  Neuro: A/O x 4, calls appropriately and makes needs known.  Activity: up with assist x1, walker and a gait belt.   Pain and N/V: Abdominal pain managed with tylenol, PO dilaudid, and lidocaine patch.  GI/: BM X2 this shift, Voiding spontaneously. AUOP.   Cardiac: Denies cardiac chest pain.   Diet: Regular.   Respiratory: Denies SOB, on RA  Lines/Drains: L PIV x2 SL.   Incisions/Skin: WDL, No new deficit. Abdominal midline incision CDI.    Lab: Reviewed.  New changes this shift: No significant changes this shift, Continue POC.     "

## 2024-04-22 NOTE — DISCHARGE SUMMARY
[facility]  Discharge Summary  General Surgery     Tessa Mansfield MRN# [id number]   YOB: 1935 Age: 89 year old     Date of Admission:  4/13/2024  Date of Discharge::  4/22/2024  Admitting Physician:  Anthony Trammell MD  Discharge Physician:  Anthony Trammell MD  Primary Care Physician:        Pedro Gomez          Admission Diagnoses:   Small bowel obstruction (H) [K56.609]            Discharge Diagnosis:   There are no discharge diagnoses documented for the most recent discharge.            Procedures:     Procedure(s): Laparotomy exploratory 4/13/24  Abdominal Re-Exploration and Closure 4/14/24          Consultations:   SURGERY GENERAL ADULT IP CONSULT  PHARMACY IP CONSULT  PHYSICAL THERAPY ADULT IP CONSULT  OCCUPATIONAL THERAPY ADULT IP CONSULT  SPEECH LANGUAGE PATH ADULT IP CONSULT  CARE MANAGEMENT / SOCIAL WORK IP CONSULT  NURSING TO CONSULT FOR VASCULAR ACCESS CARE IP CONSULT          Brief History of Illness:   Tessa Mansfield is a 89 year old female with a history of cholecystectomy and hysterectomy, CAD s/p multiple stents, CKD, Afib with Apixaban, reversed in the OR with PCC, who was admitted on 4/13/2024 with abdominal pain and clinical signs of bowel obstruction with possible ischemia. Patient was sent to OR for emergent ex lap.             Hospital Course:       The patient underwent closed loop bowel obstruction on S/P lap on 4/13 and s/p abdominal midline wound closure on 4/14.  Pt extubated on 4/14. Transferred from ICU on 4/16/24. Patient went into Afib on 4/17 with RVR (HR 120s) and elevated BP. She was on HFNC at 25 LPM. Metoprolol given for Afib with RVR. Metronidazole and CTX given for total of 4 days(4/15-4/18). Patient continued to have Afib with HR <120s, but was asymptomatic and normotensive. During her stay she was progressing appropriately from a post-op standpoint with ROBF, tolerating diet, passing gas/stool, pain adequately controlled,  and no longer requiring supplemental O2. Pt meeting criteria for discharge. TCU placement accepted.  Continues to pass BMs, tolerate diet, pain controlled and ambulating without issues. Patient with follow up with Dr. Trammell in clinic in 2 weeks.           Imaging Studies:     Results for orders placed or performed during the hospital encounter of 04/13/24   CT Abdomen Pelvis w/o Contrast    Narrative    EXAM: CT ABDOMEN AND PELVIS WITHOUT CONTRAST  LOCATION: [facility]  DATE: 04/13/2024    INDICATION: Mid and left-sided abdominal pain.  COMPARISON: 03/18/2024.  TECHNIQUE: CT scan of the abdomen and pelvis was performed without IV contrast. Multiplanar reformats were obtained. Dose reduction techniques were used.  CONTRAST: None.    FINDINGS:   LOWER CHEST: Cardiac enlargement.    HEPATOBILIARY: Liver is negative. Gallbladder is absent. No biliary dilatation.    PANCREAS: Normal.    SPLEEN: Normal.    ADRENAL GLANDS: Normal.    KIDNEYS/BLADDER: Stable noncontrast appearance of the kidneys. A few small benign-appearing bilateral renal cysts are present. Cortical scarring and atrophy in the kidneys bilaterally, left greater than right, stable in appearance. Lobulated renal   contours bilaterally. No urinary calculi or hydronephrosis. Bladder is unremarkable.    BOWEL: Multiple dilated fluid-filled loops of small bowel seen in the left side of the abdomen with decompression of the distal most small bowel. More proximal loops of small bowel are also relatively decompressed. Focal abrupt narrowing of two adjacent   loops of small bowel seen in the left upper quadrant best appreciated on series 3 image 32. There is some questionable wall thickening of the dilated loops of bowel as well as mesenteric edema present. This appearance could potentially be seen with   closed loop obstruction and cannot exclude developing ischemic change. No bowel wall pneumatosis or free air.  Colonic diverticulosis without evidence for diverticulitis.    LYMPH NODES: Normal.    VASCULATURE: Aortic calcification without aneurysm. IVC filter in place. Small amount of free fluid in the left upper quadrant about the spleen.    PELVIC ORGANS: Largely obscured by streak artifact from bilateral hip arthroplasties. Uterus appears absent.    MUSCULOSKELETAL: Mild degenerative changes lumbar spine. Bilateral hip arthroplasties.      Impression    IMPRESSION:   1.  Evidence for mechanical small bowel obstruction as detailed above. Configuration raises concern for potential closed-loop obstruction. There appears be some mild bowel wall thickening as well as mesenteric edema present. Cannot exclude developing   ischemic change. No bowel wall pneumatosis or free air. Correlation with serum lactate levels and recommend surgical consultation for further management options.    2.  Findings discussed with Dr. Tate on 04/13/2024 at 3:59 AM CDT.     XR Abdomen Port 1 View    Narrative    EXAMINATION:  XR ABDOMEN PORT 1 VIEW 4/13/2024     COMPARISON: Same date CT abdomen    HISTORY: positioning of NG.    FINDINGS: Enteric tube tip and sidehole project over the stomach. IVC  filter. No abnormally dilated loops of bowel or pneumatosis in the  visualized abdomen. Partially visualized bilateral total hip  arthroplasties.      Impression    IMPRESSION: Enteric tube tip and sidehole project over the stomach.    I have personally reviewed the examination and initial interpretation  and I agree with the findings.    HORACIO VASQUEZ MD         SYSTEM ID:  [id number]   XR Chest Port 1 View    Narrative    Portable chest    INDICATION: Intubation    COMPARISON: 3/17/2024    FINDINGS: Heart size normal. Minimal bilateral costophrenic angle  haziness with increased blunting on the left. Left subclavian  transvenous approach dual lead pacemaker again present.  Atherosclerotic calcification again at the aortic knob.  Endotracheal tube  is now present with tip approximately 5.1 cm above  the kirby. NG/OG tube beyond the inferior margin of the image.      Impression    IMPRESSION: Mild increased edema with perhaps small left greater than  right pleural effusions. Intubated. Pacemaker. Atherosclerosis.    HORACIO VASQUEZ MD         SYSTEM ID:  [id number]   XR Abdomen Port 1 View    Narrative    EXAM: XR ABDOMEN PORT 1 VIEW, 4/14/2024 12:22 PM    INDICATION: Closing. No missing item. Laparotomy exploratory.    COMPARISON: Abdominal radiograph 4/13/2024    FINDINGS:  Intraoperative film of the abdomen. Gastric tube tip and sidehole  projected over the stomach. Nonobstructive bowel gas pattern. No free  intraperitoneal air, pneumatosis or portal venous gas. IVC filter in  place. No radiopaque foreign body. Unchanged small rounded metallic  foci in the right pelvis. No acute or suspicious bone abnormalities.  Bilateral hip arthroplasties.      Impression    IMPRESSION:   No radiopaque foreign body identified.    Findings communicated with the OR 2 team by Dr. Lerma at 12:11 PM    I have personally reviewed the examination and initial interpretation  and I agree with the findings.    KATHERIN COSTELLO DO         SYSTEM ID:  [id number]   XR Chest Port 1 View    Narrative    Portable chest    INDICATION: Possible aspiration    COMPARISON: 4/13/2024    FINDINGS: Heart is normal size. Patchy densities in the lower lungs  indication worsening edema or possibly other pneumonitis comment  potentially aspiration if clinically applicable. Left subclavian  transvenous approach dual lead pacemaker again present. No ectopic  air. Contralateral blunting on the left increased.      Impression    IMPRESSION: Increasing left pleural effusion and possible edema and/or  atelectasis. Cannot exclude aspiration pneumonitis if clinically  applicable. Pacemaker.    HORACIO VASQUEZ MD         SYSTEM ID:  [id number]   XR Chest Port 1 View    Narrative    Exam: XR CHEST PORT 1  VIEW, 4/15/2024 7:14 PM    Comparison: Same day radiograph    History: Increased airway secretions    Findings:  Portable AP view of the chest. Stable left chest wall pacemaker.  Stable cardiac silhouette. Slight improved aeration of the bilateral  mid to lower lung zones. Unchanged small bilateral pleural effusions.  No pneumothorax. Bibasilar hazy opacities. Osseous structures are  unchanged.      Impression    Impression: Slight improved aeration of the bilateral mid to lower  lung zones. Otherwise, no significant change in the small bilateral  effusions with overlying hazy opacities, greater on the right.    I have personally reviewed the examination and initial interpretation  and I agree with the findings.    ERUM MAR MD         SYSTEM ID:  [id number]   XR Chest Port 1 View    Narrative    Exam: XR CHEST PORT 1 VIEW, 4/16/2024 1:20 AM    Indication: assess for pleural effusion    Comparison: Chest x-ray of 4/15/2024    Findings: AP portable chest radiograph at 40 degrees. Left chest  cardiac pacer with leads projected over the right atrium and right  ventricle. Coronary artery stent. Trachea is midline. Aortic arch  calcifications. Cardiac silhouette is unchanged. Bilateral pleural  effusions. No appreciable pneumothorax. Bilateral perihilar and  bibasilar interstitial opacities.    Osseous structures are unchanged.      Impression    Impression:   1. Unchanged, right greater than left, pleural effusions.  2. Bilateral perihilar and bibasilar interstitial opacities favoring  atelectasis versus edema.    I have personally reviewed the examination and initial interpretation  and I agree with the findings.    ERUM MAR MD         SYSTEM ID:  [id number]     *Note: Due to a large number of results and/or encounters for the requested time period, some results have not been displayed. A complete set of results can be found in Results Review.              Medications Prior to Admission:     Medications Prior  to Admission   Medication Sig Dispense Refill Last Dose    allopurinol (ZYLOPRIM) 100 MG tablet Take 1 tablet (100 mg) by mouth daily 90 tablet 0 Unknown    amLODIPine (NORVASC) 10 MG tablet Take 1 tablet (10 mg) by mouth daily 90 tablet 3 Unknown    apixaban ANTICOAGULANT (ELIQUIS) 2.5 MG tablet Take 1 tablet (2.5 mg) by mouth 2 times daily 180 tablet 3 Unknown    budesonide (PULMICORT) 0.5 MG/2ML neb solution Take 2 mLs (0.5 mg) by nebulization 2 times daily 2 mL 3 Unknown    calcium carbonate 500 mg, elemental, (OSCAL 500) 1250 (500 Ca) MG TABS tablet Take 1 tablet by mouth daily   Unknown    clopidogrel (PLAVIX) 75 MG tablet Take 1 tablet (75 mg) by mouth daily 90 tablet 1 Unknown    cyanocolbalamin (VITAMIN B-12) 1000 MCG tablet Take 500 mcg by mouth daily As directed   Unknown    FEROSUL 325 (65 Fe) MG tablet TAKE 1 TABLET BY MOUTH EVERY OTHER DAY 45 tablet 0 Unknown    fluticasone-salmeterol (ADVAIR) 250-50 MCG/ACT inhaler INHALE 1 DOSE BY MOUTH TWICE DAILY 60 each 8 Unknown    furosemide (LASIX) 40 MG tablet Take 1 tablet (40 mg) by mouth daily 30 tablet 0 Unknown    isosorbide mononitrate (ISMO/MONOKET) 20 MG tablet Take 2 tablets (40 mg) by mouth 3 times daily 540 tablet 3 Unknown    metoprolol tartrate (LOPRESSOR) 50 MG tablet Take 1 tablet (50 mg) by mouth 2 times daily 60 tablet 1 Unknown    Multiple Vitamins-Minerals (PRESERVISION AREDS 2+MULTI VIT PO) Take by mouth 2 times daily   Unknown    omeprazole (PRILOSEC) 40 MG DR capsule Take 1 capsule (40 mg) by mouth daily 90 capsule 3 Unknown    potassium chloride ER (K-TAB/KLOR-CON) 10 MEQ CR tablet Take 1 tablet (10 mEq) by mouth daily 90 tablet 3 Unknown    psyllium (METAMUCIL/KONSYL) Packet Take 1 packet by mouth daily 30 each 0 Unknown    rosuvastatin (CRESTOR) 20 MG tablet Take 1 tablet (20 mg) by mouth daily for 360 days 90 tablet 3 Unknown    timolol maleate (TIMOPTIC) 0.5 % ophthalmic solution INSTILL 1 DROP INTO EACH EYE ONCE DAILY IN THE MORNING    Unknown    vitamin D3 25 mcg (1000 units) tablet Take 1 tablet (25 mcg) by mouth every other day 90 tablet 3 Unknown              Discharge Medications:     Current Discharge Medication List        CONTINUE these medications which have NOT CHANGED    Details   allopurinol (ZYLOPRIM) 100 MG tablet Take 1 tablet (100 mg) by mouth daily  Qty: 90 tablet, Refills: 0    Associated Diagnoses: Idiopathic gout, unspecified chronicity, unspecified site      amLODIPine (NORVASC) 10 MG tablet Take 1 tablet (10 mg) by mouth daily  Qty: 90 tablet, Refills: 3    Associated Diagnoses: Essential hypertension      apixaban ANTICOAGULANT (ELIQUIS) 2.5 MG tablet Take 1 tablet (2.5 mg) by mouth 2 times daily  Qty: 180 tablet, Refills: 3    Associated Diagnoses: Renal hypertension      budesonide (PULMICORT) 0.5 MG/2ML neb solution Take 2 mLs (0.5 mg) by nebulization 2 times daily  Qty: 2 mL, Refills: 3    Associated Diagnoses: Chronic cough      calcium carbonate 500 mg, elemental, (OSCAL 500) 1250 (500 Ca) MG TABS tablet Take 1 tablet by mouth daily      clopidogrel (PLAVIX) 75 MG tablet Take 1 tablet (75 mg) by mouth daily  Qty: 90 tablet, Refills: 1    Associated Diagnoses: S/P coronary artery stent placement      cyanocolbalamin (VITAMIN B-12) 1000 MCG tablet Take 500 mcg by mouth daily As directed      FEROSUL 325 (65 Fe) MG tablet TAKE 1 TABLET BY MOUTH EVERY OTHER DAY  Qty: 45 tablet, Refills: 0    Associated Diagnoses: 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 3b chronic kidney disease (H)      fluticasone-salmeterol (ADVAIR) 250-50 MCG/ACT inhaler INHALE 1 DOSE BY MOUTH TWICE DAILY  Qty: 60 each, Refills: 8    Associated Diagnoses: Chronic cough      furosemide (LASIX) 40 MG tablet Take 1 tablet (40 mg) by mouth daily  Qty: 30 tablet, Refills: 0    Associated Diagnoses: CKD (chronic kidney disease) stage 4, GFR 15-29 ml/min (H)      isosorbide mononitrate (ISMO/MONOKET) 20 MG tablet Take 2 tablets (40 mg) by mouth 3 times daily  Qty: 540 tablet,  Refills: 3    Associated Diagnoses: Chest pain, unspecified type      metoprolol tartrate (LOPRESSOR) 50 MG tablet Take 1 tablet (50 mg) by mouth 2 times daily  Qty: 60 tablet, Refills: 1    Associated Diagnoses: Chronic atrial fibrillation (H)      Multiple Vitamins-Minerals (PRESERVISION AREDS 2+MULTI VIT PO) Take by mouth 2 times daily      omeprazole (PRILOSEC) 40 MG DR capsule Take 1 capsule (40 mg) by mouth daily  Qty: 90 capsule, Refills: 3    Associated Diagnoses: Gastroesophageal reflux disease without esophagitis      potassium chloride ER (K-TAB/KLOR-CON) 10 MEQ CR tablet Take 1 tablet (10 mEq) by mouth daily  Qty: 90 tablet, Refills: 3    Associated Diagnoses: Hypokalemia      psyllium (METAMUCIL/KONSYL) Packet Take 1 packet by mouth daily  Qty: 30 each, Refills: 0    Associated Diagnoses: Chronic diarrhea      rosuvastatin (CRESTOR) 20 MG tablet Take 1 tablet (20 mg) by mouth daily for 360 days  Qty: 90 tablet, Refills: 3    Associated Diagnoses: Coronary artery disease of native artery of native heart with stable angina pectoris (H24)      timolol maleate (TIMOPTIC) 0.5 % ophthalmic solution INSTILL 1 DROP INTO EACH EYE ONCE DAILY IN THE MORNING      vitamin D3 25 mcg (1000 units) tablet Take 1 tablet (25 mcg) by mouth every other day  Qty: 90 tablet, Refills: 3    Associated Diagnoses: Secondary renal hyperparathyroidism (H24); CKD (chronic kidney disease) stage 4, GFR 15-29 ml/min (H)                     Medications Discontinued or Adjusted During This Hospitalization:   No change           Antibiotics Prescribed at Discharge:   None prescribed           Day of Discharge Physical Exam:   Temp:  [98.1  F (36.7  C)] 98.1  F (36.7  C)  Pulse:  [66-71] 69  Resp:  [18] 18  BP: (131-154)/(52-62) 154/62  SpO2:  [94 %-96 %] 94 %    General: awake, alert, no acute distress, laying comfortably in bed   CV: warm, well perfused   Pulm: breathing comfortably on room air   Abdomen: soft, non distended, non tender,  Midline incision c/d/I closed with staples healing well.    Extremities: no edema, moving all extremities spontaneously and without apparent deficit            Final Pathology Result:   N/A           Discharge Instructions and Follow-Up:     Discharge Procedure Orders   Reason for your hospital stay   Order Comments: You were seen in the hospital for Small bowel obstruction.     Activity   Order Comments: Your activity upon discharge: activity as tolerated     Order Specific Question Answer Comments   Is discharge order? Yes      Adult [facility] Follow-up and recommended labs and tests   Order Comments: Follow up with Dr. Trammell ,within 2 weeks  to evaluate after surgery.       Appointments on [place] and/or [facility] (with [facility] or [facility] provider or service). Call [phone] if you haven't heard regarding these appointments within 7 days of discharge.     Diet   Order Comments: Follow this diet upon discharge: Orders Placed This Encounter      Snacks/Supplements Adult: Magic Cup; With Meals      Regular Diet Adult     Order Specific Question Answer Comments   Is discharge order? Yes               Home Health Care:     N/A- discharged to rehab facility           Discharge Disposition:     Transferred to [facility] accepted pt for TCU       Condition at discharge: Stable    Patient was seen, examined, and discussed on day of discharge with chief resident, who discussed with staff surgeon.    Hiram Spencer MD  Family Medicine, PGY1

## 2024-04-22 NOTE — DISCHARGE INSTRUCTIONS
Activity  - No strenuous exercise for 6 weeks.  - No lifting, pushing, pulling more than 10 pounds for 6 weeks.   - Do not strain with bowel movements.  - Do not drive until you can press the brake pedal quickly and fully without pain.   - Do not operate a motor vehicle while taking narcotic pain medications.     Incisions  - You may shower and get incisions wet starting 48 hrs after surgery.  - Do not scrub incisions or submerge wounds for 2 weeks or until seen in follow-up.   - Remove wound dressing 48 hours after surgery.   - If purple dermabond glue was used, avoid applying any lotions or ointments.   - If steri-strips were used, they will fall off on their own.   - Leave incision open to air. Cover with gauze only if needed for comfort or to protect clothing from drainage.   - The stitches do not need to be removed, they will dissolve on their own.    Medications  - You can continue to take Tylenol (acetaminophen) as needed for pain.   - Some pain medications contain both tylenol (acetaminophen) and a narcotic (Norco, vicodin, percocet), do not take more than 4,000mg of Tylenol (acetaminophen) from all sources in any 24 hour period.  - If you are sent home with narcotic pain medication, transition from narcotic pain medications to tylenol (acetaminophen) as you are able.   - Narcotics can make you constipated.  Take over the counter fiber (metamucil or benefiber) or stool softeners (miralax, docusate or senna) while taking narcotic pain medications, but stop if you develop diarrhea.  - No driving or operating machinery while taking narcotic pain medications     Follow-Up:  - Call your primary care provider to touch base regarding your recent admission.   - Call or return sooner than your regularly scheduled visit if you develop any of the following: fever (greater than 101.5), uncontrolled pain, uncontrolled nausea or vomiting, as well as increased redness, swelling, or drainage from your wound.      Follow-Up:  - A nurse from the General Surgery Clinic will contact you 24 hours, or next business day, after your discharge from the hospital. They will help you arrange your follow-up appointment in 2 weeks.    - If you have questions or to schedule a follow up appointment, please call the General Surgery Clinic at [phone].   - Call or return sooner to the ED than your regularly scheduled visit if you develop any of the following: fever >101.5, uncontrolled pain, uncontrolled nausea or vomiting, as well as increased redness, swelling, or drainage from your wound.   -  If you are receiving home care please inform your home care nurse of our contact number.

## 2024-04-23 ENCOUNTER — PATIENT OUTREACH (OUTPATIENT)
Dept: CARE COORDINATION | Facility: CLINIC | Age: 89
End: 2024-04-23
Payer: COMMERCIAL

## 2024-04-23 ENCOUNTER — PATIENT OUTREACH (OUTPATIENT)
Dept: SURGERY | Facility: CLINIC | Age: 89
End: 2024-04-23
Payer: COMMERCIAL

## 2024-04-23 NOTE — PROGRESS NOTES
Post hospital discharge call not placed as patient was transferred to [facility] ([phone]).  Post op appointment arranged with Dr. Trammell on 5/8 at 0845.

## 2024-04-23 NOTE — LETTER
PRIMARY CARE CARE COORDINATION   [facility]  [address] [place] [id number]    April 23, 2024    Tessa Mansfield  [address] [place] [id number]      Hi,   I am the RN Care Manager for the above named patient's Primary Care Provider. Please let me know any discharge planning for the patient so I can assist with coordinating their care at TCU discharge. Please feel free to reach me directly- [phone].   Thanks,  CAROL Martínez Care Manager  Primary Care Clinic   Phone: [phone]  Fax: [phone]'

## 2024-04-23 NOTE — PROGRESS NOTES
Clinic Care Coordination Contact  Care Coordination Transition Communication    Clinical Data: Patient was hospitalized at [facility] from 4/13 to 4/22 with diagnosis of small bowel obstruction.     Assessment: Patient has transitioned to TCU/ARU for short term rehabilitation:    Facility Name:  [facility]  [address] [place] [address]  Liaison phone: [phone]  RN to RN Report: [phone]  Liaison fax: [phone]  Transition Communication:  Notified facility of Primary Care- Care Coordination support via Epic fax.    Plan: Care Coordinator will await notification from facility staff informing of patient's discharge plans/needs. Care Coordinator will review chart and outreach to facility staff every 4 weeks and as needed.     LUPE CALLE RN on 4/23/2024 at 9:29 AM

## 2024-04-23 NOTE — PLAN OF CARE
Physical Therapy Discharge Summary    Reason for therapy discharge:    Discharged to transitional care facility.    Progress towards therapy goal(s). See goals on Care Plan in [facility] electronic health record for goal details.  Goals partially met.  Barriers to achieving goals:   discharge from facility.    Therapy recommendation(s):    Continued therapy is recommended.  Rationale/Recommendations:  to maximize strength and activity tolerance.

## 2024-04-25 ENCOUNTER — LAB REQUISITION (OUTPATIENT)
Dept: LAB | Facility: CLINIC | Age: 89
End: 2024-04-25
Payer: COMMERCIAL

## 2024-04-25 DIAGNOSIS — N18.9 CHRONIC KIDNEY DISEASE, UNSPECIFIED: ICD-10-CM

## 2024-04-25 DIAGNOSIS — D64.9 ANEMIA, UNSPECIFIED: ICD-10-CM

## 2024-04-26 LAB
ANION GAP SERPL CALCULATED.3IONS-SCNC: 13 MMOL/L (ref 7–15)
BASOPHILS # BLD AUTO: ABNORMAL 10*3/UL
BASOPHILS # BLD MANUAL: 0.3 10E3/UL (ref 0–0.2)
BASOPHILS NFR BLD AUTO: ABNORMAL %
BASOPHILS NFR BLD MANUAL: 4 %
BUN SERPL-MCNC: 17.2 MG/DL (ref 8–23)
BURR CELLS BLD QL SMEAR: SLIGHT
CALCIUM SERPL-MCNC: 8.4 MG/DL (ref 8.8–10.2)
CHLORIDE SERPL-SCNC: 107 MMOL/L (ref 98–107)
CREAT SERPL-MCNC: 1.86 MG/DL (ref 0.51–0.95)
DEPRECATED HCO3 PLAS-SCNC: 24 MMOL/L (ref 22–29)
EGFRCR SERPLBLD CKD-EPI 2021: 26 ML/MIN/1.73M2
EOSINOPHIL # BLD AUTO: ABNORMAL 10*3/UL
EOSINOPHIL # BLD MANUAL: 0.2 10E3/UL (ref 0–0.7)
EOSINOPHIL NFR BLD AUTO: ABNORMAL %
EOSINOPHIL NFR BLD MANUAL: 2 %
ERYTHROCYTE [DISTWIDTH] IN BLOOD BY AUTOMATED COUNT: 19.8 % (ref 10–15)
GLUCOSE SERPL-MCNC: 78 MG/DL (ref 70–99)
HCT VFR BLD AUTO: 26.4 % (ref 35–47)
HGB BLD-MCNC: 8.1 G/DL (ref 11.7–15.7)
IMM GRANULOCYTES # BLD: ABNORMAL 10*3/UL
IMM GRANULOCYTES NFR BLD: ABNORMAL %
LYMPHOCYTES # BLD AUTO: ABNORMAL 10*3/UL
LYMPHOCYTES # BLD MANUAL: 1.7 10E3/UL (ref 0.8–5.3)
LYMPHOCYTES NFR BLD AUTO: ABNORMAL %
LYMPHOCYTES NFR BLD MANUAL: 20 %
MCH RBC QN AUTO: 27.5 PG (ref 26.5–33)
MCHC RBC AUTO-ENTMCNC: 30.7 G/DL (ref 31.5–36.5)
MCV RBC AUTO: 90 FL (ref 78–100)
MONOCYTES # BLD AUTO: ABNORMAL 10*3/UL
MONOCYTES # BLD MANUAL: 1.5 10E3/UL (ref 0–1.3)
MONOCYTES NFR BLD AUTO: ABNORMAL %
MONOCYTES NFR BLD MANUAL: 18 %
NEUTROPHILS # BLD AUTO: ABNORMAL 10*3/UL
NEUTROPHILS # BLD MANUAL: 4.6 10E3/UL (ref 1.6–8.3)
NEUTROPHILS NFR BLD AUTO: ABNORMAL %
NEUTROPHILS NFR BLD MANUAL: 56 %
NRBC # BLD AUTO: 0 10E3/UL
NRBC # BLD AUTO: 0.2 10E3/UL
NRBC BLD AUTO-RTO: 0 /100
NRBC BLD MANUAL-RTO: 2 %
PLAT MORPH BLD: ABNORMAL
PLATELET # BLD AUTO: 213 10E3/UL (ref 150–450)
POLYCHROMASIA BLD QL SMEAR: SLIGHT
POTASSIUM SERPL-SCNC: 3.4 MMOL/L (ref 3.4–5.3)
RBC # BLD AUTO: 2.95 10E6/UL (ref 3.8–5.2)
RBC MORPH BLD: ABNORMAL
SODIUM SERPL-SCNC: 144 MMOL/L (ref 135–145)
WBC # BLD AUTO: 8.3 10E3/UL (ref 4–11)

## 2024-04-26 PROCEDURE — 36415 COLL VENOUS BLD VENIPUNCTURE: CPT | Mod: ORL | Performed by: FAMILY MEDICINE

## 2024-04-26 PROCEDURE — 85007 BL SMEAR W/DIFF WBC COUNT: CPT | Mod: ORL | Performed by: FAMILY MEDICINE

## 2024-04-26 PROCEDURE — 80048 BASIC METABOLIC PNL TOTAL CA: CPT | Mod: ORL | Performed by: FAMILY MEDICINE

## 2024-04-26 PROCEDURE — P9604 ONE-WAY ALLOW PRORATED TRIP: HCPCS | Mod: ORL | Performed by: FAMILY MEDICINE

## 2024-04-26 PROCEDURE — 85027 COMPLETE CBC AUTOMATED: CPT | Mod: ORL | Performed by: FAMILY MEDICINE

## 2024-05-01 ENCOUNTER — APPOINTMENT (OUTPATIENT)
Dept: CT IMAGING | Facility: CLINIC | Age: 89
DRG: 378 | End: 2024-05-01
Attending: EMERGENCY MEDICINE
Payer: COMMERCIAL

## 2024-05-01 ENCOUNTER — NURSE TRIAGE (OUTPATIENT)
Dept: NURSING | Facility: CLINIC | Age: 89
End: 2024-05-01

## 2024-05-01 ENCOUNTER — HOSPITAL ENCOUNTER (INPATIENT)
Facility: CLINIC | Age: 89
LOS: 6 days | Discharge: HOME HEALTH | DRG: 378 | End: 2024-05-07
Attending: EMERGENCY MEDICINE | Admitting: INTERNAL MEDICINE
Payer: COMMERCIAL

## 2024-05-01 DIAGNOSIS — I10 ESSENTIAL HYPERTENSION, BENIGN: Primary | ICD-10-CM

## 2024-05-01 DIAGNOSIS — I25.118 CORONARY ARTERY DISEASE OF NATIVE ARTERY OF NATIVE HEART WITH STABLE ANGINA PECTORIS (H): ICD-10-CM

## 2024-05-01 DIAGNOSIS — K92.1 MELENA: ICD-10-CM

## 2024-05-01 DIAGNOSIS — K56.609 SMALL BOWEL OBSTRUCTION (H): ICD-10-CM

## 2024-05-01 DIAGNOSIS — Z79.01 LONG TERM (CURRENT) USE OF ANTICOAGULANTS: ICD-10-CM

## 2024-05-01 DIAGNOSIS — Z98.890 OTHER SPECIFIED POSTPROCEDURAL STATES: ICD-10-CM

## 2024-05-01 DIAGNOSIS — K92.2 UGIB (UPPER GASTROINTESTINAL BLEED): ICD-10-CM

## 2024-05-01 DIAGNOSIS — G89.18 ACUTE POST-OPERATIVE PAIN: ICD-10-CM

## 2024-05-01 DIAGNOSIS — Z87.891 HISTORY OF TOBACCO USE: ICD-10-CM

## 2024-05-01 LAB
ABO/RH(D): NORMAL
ACANTHOCYTES BLD QL SMEAR: NORMAL
ALBUMIN SERPL BCG-MCNC: 3.6 G/DL (ref 3.5–5.2)
ALP SERPL-CCNC: 72 U/L (ref 40–150)
ALT SERPL W P-5'-P-CCNC: 23 U/L (ref 0–50)
ANION GAP SERPL CALCULATED.3IONS-SCNC: 17 MMOL/L (ref 7–15)
ANTIBODY SCREEN: NEGATIVE
APTT PPP: 38 SECONDS (ref 22–38)
AST SERPL W P-5'-P-CCNC: 42 U/L (ref 0–45)
AUER BODIES BLD QL SMEAR: NORMAL
BASO STIPL BLD QL SMEAR: NORMAL
BASOPHILS # BLD AUTO: 0.1 10E3/UL (ref 0–0.2)
BASOPHILS # BLD AUTO: ABNORMAL 10*3/UL
BASOPHILS # BLD MANUAL: 0 10E3/UL (ref 0–0.2)
BASOPHILS NFR BLD AUTO: 1 %
BASOPHILS NFR BLD AUTO: ABNORMAL %
BASOPHILS NFR BLD MANUAL: 0 %
BILIRUB SERPL-MCNC: 0.5 MG/DL
BITE CELLS BLD QL SMEAR: NORMAL
BLISTER CELLS BLD QL SMEAR: NORMAL
BUN SERPL-MCNC: 43.3 MG/DL (ref 8–23)
BURR CELLS BLD QL SMEAR: NORMAL
C DIFF TOX B STL QL: NEGATIVE
CALCIUM SERPL-MCNC: 8.8 MG/DL (ref 8.8–10.2)
CHLORIDE SERPL-SCNC: 105 MMOL/L (ref 98–107)
CREAT SERPL-MCNC: 1.77 MG/DL (ref 0.51–0.95)
DACRYOCYTES BLD QL SMEAR: NORMAL
DEPRECATED HCO3 PLAS-SCNC: 20 MMOL/L (ref 22–29)
EGFRCR SERPLBLD CKD-EPI 2021: 27 ML/MIN/1.73M2
ELLIPTOCYTES BLD QL SMEAR: NORMAL
EOSINOPHIL # BLD AUTO: 0 10E3/UL (ref 0–0.7)
EOSINOPHIL # BLD AUTO: ABNORMAL 10*3/UL
EOSINOPHIL # BLD MANUAL: 0 10E3/UL (ref 0–0.7)
EOSINOPHIL NFR BLD AUTO: 1 %
EOSINOPHIL NFR BLD AUTO: ABNORMAL %
EOSINOPHIL NFR BLD MANUAL: 0 %
ERYTHROCYTE [DISTWIDTH] IN BLOOD BY AUTOMATED COUNT: 19.1 % (ref 10–15)
ERYTHROCYTE [DISTWIDTH] IN BLOOD BY AUTOMATED COUNT: 19.4 % (ref 10–15)
FERRITIN SERPL-MCNC: 264 NG/ML (ref 11–328)
FOLATE SERPL-MCNC: 28.3 NG/ML (ref 4.6–34.8)
FRAGMENTS BLD QL SMEAR: NORMAL
GLUCOSE SERPL-MCNC: 93 MG/DL (ref 70–99)
HAPTOGLOB SERPL-MCNC: 331 MG/DL (ref 30–200)
HCT VFR BLD AUTO: 26.1 % (ref 35–47)
HCT VFR BLD AUTO: 26.2 % (ref 35–47)
HGB BLD-MCNC: 8.1 G/DL (ref 11.7–15.7)
HGB BLD-MCNC: 8.2 G/DL (ref 11.7–15.7)
HGB C CRYSTALS: NORMAL
HOWELL-JOLLY BOD BLD QL SMEAR: NORMAL
IMM GRANULOCYTES # BLD: 0 10E3/UL
IMM GRANULOCYTES # BLD: ABNORMAL 10*3/UL
IMM GRANULOCYTES NFR BLD: 0 %
IMM GRANULOCYTES NFR BLD: ABNORMAL %
INR PPP: 1.33 (ref 0.85–1.15)
INR PPP: 1.41 (ref 0.85–1.15)
IRON BINDING CAPACITY (ROCHE): 236 UG/DL (ref 240–430)
IRON SATN MFR SERPL: 28 % (ref 15–46)
IRON SERPL-MCNC: 67 UG/DL (ref 37–145)
LDH SERPL L TO P-CCNC: 317 U/L (ref 0–250)
LYMPHOCYTES # BLD AUTO: 1.9 10E3/UL (ref 0.8–5.3)
LYMPHOCYTES # BLD AUTO: ABNORMAL 10*3/UL
LYMPHOCYTES # BLD MANUAL: 2.3 10E3/UL (ref 0.8–5.3)
LYMPHOCYTES NFR BLD AUTO: 34 %
LYMPHOCYTES NFR BLD AUTO: ABNORMAL %
LYMPHOCYTES NFR BLD MANUAL: 43 %
MCH RBC QN AUTO: 27.2 PG (ref 26.5–33)
MCH RBC QN AUTO: 27.4 PG (ref 26.5–33)
MCHC RBC AUTO-ENTMCNC: 31 G/DL (ref 31.5–36.5)
MCHC RBC AUTO-ENTMCNC: 31.3 G/DL (ref 31.5–36.5)
MCV RBC AUTO: 88 FL (ref 78–100)
MCV RBC AUTO: 88 FL (ref 78–100)
MONOCYTES # BLD AUTO: 1.3 10E3/UL (ref 0–1.3)
MONOCYTES # BLD AUTO: ABNORMAL 10*3/UL
MONOCYTES # BLD MANUAL: 0.9 10E3/UL (ref 0–1.3)
MONOCYTES NFR BLD AUTO: 24 %
MONOCYTES NFR BLD AUTO: ABNORMAL %
MONOCYTES NFR BLD MANUAL: 17 %
NEUTROPHILS # BLD AUTO: 2.2 10E3/UL (ref 1.6–8.3)
NEUTROPHILS # BLD AUTO: ABNORMAL 10*3/UL
NEUTROPHILS # BLD MANUAL: 2.2 10E3/UL (ref 1.6–8.3)
NEUTROPHILS NFR BLD AUTO: 40 %
NEUTROPHILS NFR BLD AUTO: ABNORMAL %
NEUTROPHILS NFR BLD MANUAL: 40 %
NEUTS HYPERSEG BLD QL SMEAR: NORMAL
NRBC # BLD AUTO: 0 10E3/UL
NRBC # BLD AUTO: 0 10E3/UL
NRBC BLD AUTO-RTO: 0 /100
NRBC BLD AUTO-RTO: 0 /100
PLAT MORPH BLD: NORMAL
PLAT MORPH BLD: NORMAL
PLATELET # BLD AUTO: 172 10E3/UL (ref 150–450)
PLATELET # BLD AUTO: 189 10E3/UL (ref 150–450)
POLYCHROMASIA BLD QL SMEAR: NORMAL
POTASSIUM SERPL-SCNC: 3.7 MMOL/L (ref 3.4–5.3)
PROT SERPL-MCNC: 6.7 G/DL (ref 6.4–8.3)
RBC # BLD AUTO: 2.98 10E6/UL (ref 3.8–5.2)
RBC # BLD AUTO: 2.99 10E6/UL (ref 3.8–5.2)
RBC AGGLUT BLD QL: NORMAL
RBC MORPH BLD: NORMAL
RBC MORPH BLD: NORMAL
RETIC HEMOGLOBIN: 25.3 PG (ref 28.2–35.7)
RETICS # AUTO: 0.09 10E6/UL (ref 0.03–0.1)
RETICS # AUTO: 0.09 10E6/UL (ref 0.03–0.1)
RETICS/RBC NFR AUTO: 2.9 % (ref 0.5–2)
RETICS/RBC NFR AUTO: 3 % (ref 0.5–2)
ROULEAUX BLD QL SMEAR: NORMAL
SICKLE CELLS BLD QL SMEAR: NORMAL
SMUDGE CELLS BLD QL SMEAR: NORMAL
SODIUM SERPL-SCNC: 142 MMOL/L (ref 135–145)
SPECIMEN EXPIRATION DATE: NORMAL
SPHEROCYTES BLD QL SMEAR: NORMAL
STOMATOCYTES BLD QL SMEAR: NORMAL
TARGETS BLD QL SMEAR: NORMAL
TOXIC GRANULES BLD QL SMEAR: NORMAL
VARIANT LYMPHS BLD QL SMEAR: NORMAL
VIT B12 SERPL-MCNC: 3761 PG/ML (ref 232–1245)
WBC # BLD AUTO: 5.4 10E3/UL (ref 4–11)
WBC # BLD AUTO: 5.5 10E3/UL (ref 4–11)

## 2024-05-01 PROCEDURE — 99285 EMERGENCY DEPT VISIT HI MDM: CPT | Performed by: EMERGENCY MEDICINE

## 2024-05-01 PROCEDURE — 250N000009 HC RX 250: Performed by: INTERNAL MEDICINE

## 2024-05-01 PROCEDURE — 250N000011 HC RX IP 250 OP 636: Performed by: INTERNAL MEDICINE

## 2024-05-01 PROCEDURE — 82746 ASSAY OF FOLIC ACID SERUM: CPT | Performed by: INTERNAL MEDICINE

## 2024-05-01 PROCEDURE — C9113 INJ PANTOPRAZOLE SODIUM, VIA: HCPCS | Performed by: INTERNAL MEDICINE

## 2024-05-01 PROCEDURE — 99418 PROLNG IP/OBS E/M EA 15 MIN: CPT | Mod: 24

## 2024-05-01 PROCEDURE — 86923 COMPATIBILITY TEST ELECTRIC: CPT | Performed by: INTERNAL MEDICINE

## 2024-05-01 PROCEDURE — 250N000013 HC RX MED GY IP 250 OP 250 PS 637: Performed by: INTERNAL MEDICINE

## 2024-05-01 PROCEDURE — 999N000157 HC STATISTIC RCP TIME EA 10 MIN

## 2024-05-01 PROCEDURE — 99285 EMERGENCY DEPT VISIT HI MDM: CPT | Mod: 25 | Performed by: EMERGENCY MEDICINE

## 2024-05-01 PROCEDURE — 85045 AUTOMATED RETICULOCYTE COUNT: CPT | Performed by: INTERNAL MEDICINE

## 2024-05-01 PROCEDURE — 85730 THROMBOPLASTIN TIME PARTIAL: CPT | Performed by: EMERGENCY MEDICINE

## 2024-05-01 PROCEDURE — 87493 C DIFF AMPLIFIED PROBE: CPT | Performed by: INTERNAL MEDICINE

## 2024-05-01 PROCEDURE — 120N000005 HC R&B MS OVERFLOW UMMC

## 2024-05-01 PROCEDURE — 99222 1ST HOSP IP/OBS MODERATE 55: CPT | Mod: GC | Performed by: INTERNAL MEDICINE

## 2024-05-01 PROCEDURE — 85046 RETICYTE/HGB CONCENTRATE: CPT | Performed by: INTERNAL MEDICINE

## 2024-05-01 PROCEDURE — 82607 VITAMIN B-12: CPT | Performed by: INTERNAL MEDICINE

## 2024-05-01 PROCEDURE — 85060 BLOOD SMEAR INTERPRETATION: CPT | Performed by: PATHOLOGY

## 2024-05-01 PROCEDURE — C9113 INJ PANTOPRAZOLE SODIUM, VIA: HCPCS | Performed by: EMERGENCY MEDICINE

## 2024-05-01 PROCEDURE — 83550 IRON BINDING TEST: CPT | Performed by: INTERNAL MEDICINE

## 2024-05-01 PROCEDURE — 250N000011 HC RX IP 250 OP 636: Performed by: EMERGENCY MEDICINE

## 2024-05-01 PROCEDURE — 74176 CT ABD & PELVIS W/O CONTRAST: CPT

## 2024-05-01 PROCEDURE — 258N000003 HC RX IP 258 OP 636: Performed by: INTERNAL MEDICINE

## 2024-05-01 PROCEDURE — 36415 COLL VENOUS BLD VENIPUNCTURE: CPT | Performed by: EMERGENCY MEDICINE

## 2024-05-01 PROCEDURE — 74176 CT ABD & PELVIS W/O CONTRAST: CPT | Mod: 26 | Performed by: RADIOLOGY

## 2024-05-01 PROCEDURE — 94640 AIRWAY INHALATION TREATMENT: CPT | Mod: 76

## 2024-05-01 PROCEDURE — 83010 ASSAY OF HAPTOGLOBIN QUANT: CPT | Performed by: INTERNAL MEDICINE

## 2024-05-01 PROCEDURE — 86900 BLOOD TYPING SEROLOGIC ABO: CPT | Performed by: EMERGENCY MEDICINE

## 2024-05-01 PROCEDURE — 82728 ASSAY OF FERRITIN: CPT | Performed by: INTERNAL MEDICINE

## 2024-05-01 PROCEDURE — 85007 BL SMEAR W/DIFF WBC COUNT: CPT | Performed by: INTERNAL MEDICINE

## 2024-05-01 PROCEDURE — 99223 1ST HOSP IP/OBS HIGH 75: CPT | Mod: 24

## 2024-05-01 PROCEDURE — 85610 PROTHROMBIN TIME: CPT | Performed by: INTERNAL MEDICINE

## 2024-05-01 PROCEDURE — 85025 COMPLETE CBC W/AUTO DIFF WBC: CPT | Performed by: EMERGENCY MEDICINE

## 2024-05-01 PROCEDURE — 82040 ASSAY OF SERUM ALBUMIN: CPT | Performed by: EMERGENCY MEDICINE

## 2024-05-01 PROCEDURE — 85027 COMPLETE CBC AUTOMATED: CPT | Performed by: INTERNAL MEDICINE

## 2024-05-01 PROCEDURE — 85610 PROTHROMBIN TIME: CPT | Performed by: EMERGENCY MEDICINE

## 2024-05-01 PROCEDURE — 36415 COLL VENOUS BLD VENIPUNCTURE: CPT | Performed by: INTERNAL MEDICINE

## 2024-05-01 PROCEDURE — 83615 LACTATE (LD) (LDH) ENZYME: CPT | Performed by: INTERNAL MEDICINE

## 2024-05-01 RX ORDER — BUDESONIDE 0.5 MG/2ML
0.5 INHALANT ORAL 2 TIMES DAILY
Status: DISCONTINUED | OUTPATIENT
Start: 2024-05-01 | End: 2024-05-06

## 2024-05-01 RX ORDER — CALCIUM CARBONATE 500 MG/1
1000 TABLET, CHEWABLE ORAL 4 TIMES DAILY PRN
Status: DISCONTINUED | OUTPATIENT
Start: 2024-05-01 | End: 2024-05-07 | Stop reason: HOSPADM

## 2024-05-01 RX ORDER — ALLOPURINOL 100 MG/1
100 TABLET ORAL DAILY
Status: DISCONTINUED | OUTPATIENT
Start: 2024-05-01 | End: 2024-05-07 | Stop reason: HOSPADM

## 2024-05-01 RX ORDER — FLUTICASONE FUROATE AND VILANTEROL 100; 25 UG/1; UG/1
1 POWDER RESPIRATORY (INHALATION) DAILY
Status: DISCONTINUED | OUTPATIENT
Start: 2024-05-01 | End: 2024-05-07 | Stop reason: HOSPADM

## 2024-05-01 RX ORDER — FLUTICASONE PROPIONATE AND SALMETEROL 250; 50 UG/1; UG/1
1 POWDER RESPIRATORY (INHALATION) 2 TIMES DAILY
Status: DISCONTINUED | OUTPATIENT
Start: 2024-05-01 | End: 2024-05-01

## 2024-05-01 RX ORDER — AMOXICILLIN 250 MG
2 CAPSULE ORAL 2 TIMES DAILY PRN
Status: DISCONTINUED | OUTPATIENT
Start: 2024-05-01 | End: 2024-05-07 | Stop reason: HOSPADM

## 2024-05-01 RX ORDER — LIDOCAINE 40 MG/G
CREAM TOPICAL
Status: DISCONTINUED | OUTPATIENT
Start: 2024-05-01 | End: 2024-05-07 | Stop reason: HOSPADM

## 2024-05-01 RX ORDER — LANOLIN ALCOHOL/MO/W.PET/CERES
3 CREAM (GRAM) TOPICAL
Status: DISCONTINUED | OUTPATIENT
Start: 2024-05-01 | End: 2024-05-07 | Stop reason: HOSPADM

## 2024-05-01 RX ORDER — METHOCARBAMOL 500 MG/1
500 TABLET, FILM COATED ORAL EVERY 6 HOURS PRN
Status: DISCONTINUED | OUTPATIENT
Start: 2024-05-01 | End: 2024-05-07 | Stop reason: HOSPADM

## 2024-05-01 RX ORDER — ROSUVASTATIN CALCIUM 10 MG/1
10 TABLET, COATED ORAL EVERY EVENING
Status: DISCONTINUED | OUTPATIENT
Start: 2024-05-01 | End: 2024-05-07 | Stop reason: HOSPADM

## 2024-05-01 RX ORDER — AMOXICILLIN 250 MG
1 CAPSULE ORAL 2 TIMES DAILY PRN
Status: DISCONTINUED | OUTPATIENT
Start: 2024-05-01 | End: 2024-05-07 | Stop reason: HOSPADM

## 2024-05-01 RX ADMIN — BUDESONIDE 0.5 MG: 0.5 INHALANT RESPIRATORY (INHALATION) at 20:37

## 2024-05-01 RX ADMIN — Medication 3 MG: at 20:30

## 2024-05-01 RX ADMIN — FLUTICASONE FUROATE AND VILANTEROL TRIFENATATE 1 PUFF: 100; 25 POWDER RESPIRATORY (INHALATION) at 20:57

## 2024-05-01 RX ADMIN — PANTOPRAZOLE SODIUM 40 MG: 40 INJECTION, POWDER, FOR SOLUTION INTRAVENOUS at 12:49

## 2024-05-01 RX ADMIN — ALLOPURINOL 100 MG: 100 TABLET ORAL at 20:30

## 2024-05-01 RX ADMIN — ROSUVASTATIN CALCIUM 10 MG: 10 TABLET, FILM COATED ORAL at 20:30

## 2024-05-01 RX ADMIN — PANTOPRAZOLE SODIUM 40 MG: 40 INJECTION, POWDER, FOR SOLUTION INTRAVENOUS at 20:30

## 2024-05-01 RX ADMIN — IRON SUCROSE 300 MG: 20 INJECTION, SOLUTION INTRAVENOUS at 20:30

## 2024-05-01 ASSESSMENT — ACTIVITIES OF DAILY LIVING (ADL)
ADLS_ACUITY_SCORE: 40
DEPENDENT_IADLS:: INDEPENDENT
HEARING_DIFFICULTY_OR_DEAF: NO
DRESSING/BATHING_DIFFICULTY: NO
DIFFICULTY_EATING/SWALLOWING: NO
ADLS_ACUITY_SCORE: 25
ADLS_ACUITY_SCORE: 25
WALKING_OR_CLIMBING_STAIRS_DIFFICULTY: NO
ADLS_ACUITY_SCORE: 25
ADLS_ACUITY_SCORE: 25
ADLS_ACUITY_SCORE: 40
ADLS_ACUITY_SCORE: 25
WEAR_GLASSES_OR_BLIND: YES
CONCENTRATING,_REMEMBERING_OR_MAKING_DECISIONS_DIFFICULTY: NO
DOING_ERRANDS_INDEPENDENTLY_DIFFICULTY: NO
ADLS_ACUITY_SCORE: 40
ADLS_ACUITY_SCORE: 40
CHANGE_IN_FUNCTIONAL_STATUS_SINCE_ONSET_OF_CURRENT_ILLNESS/INJURY: YES
ADLS_ACUITY_SCORE: 40
DIFFICULTY_COMMUNICATING: NO
TOILETING_ISSUES: NO
FALL_HISTORY_WITHIN_LAST_SIX_MONTHS: NO
ADLS_ACUITY_SCORE: 40
ADLS_ACUITY_SCORE: 40

## 2024-05-01 NOTE — ED TRIAGE NOTES
Patient has a concern for a GI bleed. 3x dark and tarry stools in the past 12 hours. Recent history of bowel obstruction and surgery. On blood thinners.

## 2024-05-01 NOTE — LETTER
Tessa Mansfield MRN# [id number]   YOB: 1935 Age: 89 year old     Date of Admission:  5/1/24  Date of Discharge:  5/7/2024  Admitting Physician:  Marian Butterfield MD  Discharging Physician: Blayne De Jesus MD   Discharging Service:  Medicine  Hospitalization Status: Inpatient     Primary Care Clinic:  [facility]  Primary Care Provider: Pedro Gomez     Dear Dr. Gomez:            You have been identified as the Primary Care Provider for Tessa Mansfield, who was recently admitted to the [facility].  Thank you for the referral to our hospital.  It is our goal to provide the highest quality of care for our patients, including planning for seamless continuity of care by providing you with timely, accurate and concise information.  After reviewing the following combined discharge summary and final progress note, please contact us if you have any remaining questions.  The Discharging Physician will be the best informed, with their contact information listed above.  If unable to reach them, or if you have received this letter in error, please call [phone] and someone will try to help you.

## 2024-05-01 NOTE — LETTER
"May 6, 2024      Tessa RAHEEM Jonathonebony  [address]  [facility] [address]        Dear ,    We are writing to inform you of your test results.    At the time of your recent upper GI endoscopy, we took biopsies of your stomach. These biopsies did not show any concerning findings.    If you have any questions, please contact the Gastroenterology nurse at [phone].     Resulted Orders   Surgical Pathology Exam   Result Value Ref Range    Case Report       Surgical Pathology Report                         Case: [id number]                                  Authorizing Provider:  Tavo Donaldson MD Collected:           05/02/2024 12:31 PM          Ordering Location:     [facility]     Received:            05/02/2024 01:06 PM                                 [facility]                                                   Pathologist:           Anthony Canela MD                                                                 Specimen:    Stomach, Body, nodular mucosa                                                              Addendum       Iron stain with appropriate control is performed on part A.  Iron deposits are confirmed on the special stain suggestive of iron pill gastritis.       Final Diagnosis       A.  GASTRIC BODY NODULAR REGION, BIOPSY:  -Gastric body mucosa with features of nodular reactive gastropathy  -No H. pylori-like organisms identified on routine stain  -Negative for intestinal metaplasia and dysplasia  -Iron stain has been ordered and will be reported in an addendum      Clinical Information       Melena        Gross Description       A(1). Stomach, Body, nodular mucosa:  The specimen is received in formalin with proper patient identification, labeled \"stomach, body nodular mucosa\".  The specimen consists of 3 irregular tan-pink soft tissues up to 0.4 cm.  The " specimen is submitted entirely in cassette A1.       Microscopic Description       Microscopic examination was performed.      Case was reviewed by the following:  Resident Pathologist: Judy Perez MD  A resident or fellow in a training program was involved in the initial review, preparation, and/or interpretation of this case.  I, as the senior physician, attest that I have personally reviewed all specimens and or slides, including the listed special stains, and used them with my medical judgement to determine the final diagnosis.              Performing Labs       The technical component of this testing was completed at [facility]      Case Images         If you have any questions or concerns, please call the clinic at the number listed above.       Sincerely,      Tavo Donaldson MD

## 2024-05-01 NOTE — H&P
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 She is hemodynamically stable with stable hemoglobin, and well appearing. She is being admitted with GI consult for EGD in the morning.     Melana with Azotemia   Anemia, chronic   History of bleeding colonic angiodysplastic lesion in the ascending colon s/p clips 7/2023  Colonic polyps  Diverticulosis   Presenting with one day of melana. Hgb 8.1, near baseline 8-10. Normal vitals. Not tachycardic however on beta blocker pta. CT abdomen/pelvis without acute hemorrhage/fluid collection  - GI consulted, EGD tomorrow   - CLD, no red or purple  - NPO at midnight   - s/p IV PPI 40mg in the ID, continue PPI 40mg BID   - hold plavix and clopidogrel   - Check b12, folate, iron and iron binding capacity, ferritin, retic, LDH, peripheral smear   - place 2 large bore Ivs  - Monitor hgb, transfuse if less than 7 or hemodynamically unstable   - Avoid NSAIDs    Recent admission (4/2023) for small bowel obstruction with possible ischemia  Evolving postsurgical edema and/or fat necrosis  She was admitted 4/13 to 4/22.  "She underwent laporoscopy on 4/13 c/b bleeding with return to the OR 4/13/24. Abdominal closure completed 4/14.  CT abdomen/pelvis here notable for \"Postsurgical changes of the abdomen with ventral abdominal wall incision. Immediately deep to the incision and rectus abdominis musculature, there is stranding associated with lobules of fat, likely evolving postsurgical edema and/or fat necrosis. Soft tissue  thickening in the transverse mesocolon and small amount of blood products in the right central mesentery, also likely evolving surgical change\". She is not having new abdominal pain. Has previous pain that is present at the superior portion of her abdominal incision.   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   HTN  - hold amlodipine   - holding furosemide   - hold potassium chloride       ?gout  - pta allopurinol       HLD  - pta rosuvastatin            Diet: NPO per Anesthesia Guidelines for Procedure/Surgery Except for: Meds  Clear Liquid Diet    DVT Prophylaxis: none, bleeding  Bonner Catheter: Not present  Fluids: none  Lines: None     Cardiac Monitoring: None  Code Status: Full Code  - discussed on admission     Clinically Significant Risk Factors Present on Admission               # Drug Induced Coagulation Defect: home medication list includes an anticoagulant medication  # Drug Induced Platelet Defect: home medication list includes an antiplatelet medication   # Hypertension: Noted on problem list  # Chronic heart failure with preserved ejection fraction: heart failure noted on problem list and last echo with EF >50%     # Overweight: Estimated body mass " "index is 25.58 kg/m  as calculated from the following:    Height as of this encounter: 1.676 m (5' 6\").    Weight as of 4/19/24: 71.9 kg (158 lb 8.2 oz).       # Financial/Environmental Concerns:     # Pacemaker present       Disposition Plan      Expected Discharge Date: 05/03/2024                The patient's care was discussed with the Attending Physician, Dr. Velásquez .      Sabrina Torres MD  Medicine Service, [address]  Securely message with [facility] (more info)  Text page via [facility] Paging/Directory   See signed in provider for up to date coverage information  ______________________________________________________________________    Chief Complaint   Diana Zarate is a 88 y/o female with medical history significant for chronic anemia, CAD with history of multiple stents (most recently 11/6/2023 LAD PCI) on Apixaban and Clopidogrel, sick sinus syndrome status-post dual-chamber PPM, paroxysmal atrial fibrillation, mild-moderate mitral regurgitation, moderate tricuspid regurgitation, 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 hypertensive nephrosclerosis, hypertension, chronic cough, 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     Patient reports one episode of black stool after getting home from the rehab facility yesterday and another one this morning. No chest pain, shortness of breath, dizziness or " lightheadedness. She had another episode I the ED. She reports abdominal pain superior to her abdominal incision that has not changed. No new abdominal pain. No vomiting or nausea. No NSAID use. She did not take her anticoagulants today.     History of Present Illness   Tessa Mansfield is a 89 year old female who with medical history significant for anemia, CAD with multiple stents       Past Medical History    Past Medical History:   Diagnosis Date    CAD (coronary artery disease)     CAD s/p PCI+BMS to MRCA 10/2002, PCI to mLCX 2/2003, PCI+DESx2 to pLAD 11/2007, PCI+DESx1 to dRCA 12/2007, PCI+ALVAREZ to RPDA 7/2013; on indefinite DAPT  10/27/2002    10/27/2002: AMI s/p PCI+BMS (3.5x18mm Bx velocity) to mRCA 2/5/2003: Back pain; PCI+stent to mLCX c/b distal embolization/slow flow 4/11/2003: Unstable angina; PCI+stent (Hepacoat velocity) to mLAD 11/27/2007: PCI+DESx2 to pLAD (IVUS w/ calcification of LMCA and ulcerated plaque pLAD, 80% dRCA; also had 80% dLCX, too small to stent) 12/11/2007: Staged PCI. PCI+DESx1 (3.5x13mm Cypher) to dRCA (indefinite DAPT recommended at this time) 6/27/2008: Angina. mLCX stent 70% ISR. LAD and RCA stents patent. Myocardium at risk from LCX felt to be small, medical management preferred to PCI. 11/10/2009: Angina. Findings unchanged from 6/27/2008, FFR LAD 0.90. Medical management recommended. 7/23/2013: Unstable angina; PCI+ALVAREZ to mPDA. Diagnostic findings: LMCA 40% distal. LAD: pLAD stents patent mLAD 30% ISR dLAD 50% diffuse, D2 diffuse disease. LCX 80% mid ISR. RCA diffuse <30% mid, RPLAs diffuse disease, RPDA 100% occlusion.      Cardiac Pacemaker- [employer]tr[employer], dual chamber- NOT dependant 11/28/2007    CKD (chronic kidney disease), stage IV (H)     Hyperlipidemia LDL goal <70     Hypertension     Stented coronary artery 10-    RCA    Stented coronary artery 2-5-2003    LCx    Stented coronary artery 4-    LAD    Stented coronary artery 11-    LAD    Stented  coronary artery 12-    RCA    Transient complete heart block (H) 11/28/2007       Past Surgical History   Past Surgical History:   Procedure Laterality Date    CHOLECYSTECTOMY      CV CORONARY ANGIOGRAM N/A 6/17/2022    Procedure: Coronary Angiogram;  Surgeon: Constantine Macias MD;  Location:  HEART CARDIAC CATH LAB    CV CORONARY ANGIOGRAM N/A 7/17/2023    Procedure: Coronary Angiogram;  Surgeon: Constantine Macias MD;  Location:  HEART CARDIAC CATH LAB    CV PCI N/A 6/17/2022    Procedure: Percutaneous Coronary Intervention;  Surgeon: Constantine Macias MD;  Location:  HEART CARDIAC CATH LAB    CV PCI N/A 7/17/2023    Procedure: Percutaneous Coronary Intervention;  Surgeon: Constantine Macias MD;  Location: [facility] CARDIAC CATH LAB    CV PCI N/A 11/6/2023    Procedure: Percutaneous Coronary Intervention;  Surgeon: Constantine Macias MD;  Location:  HEART CARDIAC CATH LAB    CV RIGHT HEART CATH MEASUREMENTS RECORDED N/A 6/17/2022    Procedure: Right Heart Catheterization;  Surgeon: Constantine Macias MD;  Location:  HEART CARDIAC CATH LAB    EP PACEMAKER N/A 10/15/2019    Procedure: EP PACEMAKER;  Surgeon: Anthony Zhu MD;  Location:  HEART CARDIAC CATH LAB    ESOPHAGOSCOPY, GASTROSCOPY, DUODENOSCOPY (EGD), COMBINED N/A 7/20/2023    Procedure: Esophagoscopy, gastroscopy, duodenoscopy (EGD), combined;  Surgeon: Bekah Dash DO;  Location:  GI    HC PPM INSERTION NEW/REPLACEMENT W/ ATRIAL&VENTRICULAR LEAD  11-    HYSTERECTOMY      LAPAROTOMY EXPLORATORY N/A 4/13/2024    Procedure: Laparotomy exploratory, Wound Vac Placement.;  Surgeon: Anthony Trammell MD;  Location: [facility] OR    LAPAROTOMY EXPLORATORY N/A 4/14/2024    Procedure: Abdominal Re-Exploration and Closure;  Surgeon: Anthony Trammell MD;  Location: [facility] OR    LAPAROTOMY EXPLORATORY N/A 4/13/2024    Procedure: Exploratory Laparotomy;  Surgeon: Anthony Trammell MD;  Location: [facility] OR        Prior to Admission Medications   Prior to Admission Medications   Prescriptions Last Dose Informant Patient Reported? Taking?   FEROSUL 325 (65 Fe) MG tablet   No No   Sig: TAKE 1 TABLET BY MOUTH EVERY OTHER DAY   HYDROmorphone (DILAUDID) 2 MG tablet   No No   Sig: Take 1 tablet (2 mg) by mouth every 3 hours as needed for severe pain   Multiple Vitamins-Minerals (PRESERVISION AREDS 2+MULTI VIT PO)  Self Yes No   Sig: Take by mouth 2 times daily   allopurinol (ZYLOPRIM) 100 MG tablet   No No   Sig: Take 1 tablet (100 mg) by mouth daily   amLODIPine (NORVASC) 10 MG tablet   No No   Sig: Take 1 tablet (10 mg) by mouth daily   apixaban ANTICOAGULANT (ELIQUIS) 2.5 MG tablet   No No   Sig: Take 1 tablet (2.5 mg) by mouth 2 times daily   budesonide (PULMICORT) 0.5 MG/2ML neb solution  Self No No   Sig: Take 2 mLs (0.5 mg) by nebulization 2 times daily   calcium carbonate 500 mg, elemental, (OSCAL 500) 1250 (500 Ca) MG TABS tablet   Yes No   Sig: Take 1 tablet by mouth daily   clopidogrel (PLAVIX) 75 MG tablet   No No   Sig: Take 1 tablet (75 mg) by mouth daily   cyanocolbalamin (VITAMIN B-12) 1000 MCG tablet  Self Yes No   Sig: Take 500 mcg by mouth daily As directed   fluticasone-salmeterol (ADVAIR) 250-50 MCG/ACT inhaler  Self No No   Sig: INHALE 1 DOSE BY MOUTH TWICE DAILY   furosemide (LASIX) 40 MG tablet   No No   Sig: Take 1 tablet (40 mg) by mouth daily   isosorbide mononitrate (ISMO/MONOKET) 20 MG tablet   No No   Sig: Take 2 tablets (40 mg) by mouth 3 times daily   methocarbamol (ROBAXIN) 500 MG tablet   No No   Sig: Take 1 tablet (500 mg) by mouth every 6 hours as needed for muscle spasms   metoprolol tartrate (LOPRESSOR) 50 MG tablet   No No   Sig: Take 1 tablet (50 mg) by mouth 2 times daily   omeprazole (PRILOSEC) 40 MG DR capsule   No No   Sig: Take 1 capsule (40 mg) by mouth daily   potassium chloride ER (K-TAB/KLOR-CON) 10 MEQ CR tablet   No No   Sig: Take 1 tablet (10 mEq) by mouth daily   psyllium  (METAMUCIL/KONSYL) Packet   No No   Sig: Take 1 packet by mouth daily   rosuvastatin (CRESTOR) 20 MG tablet   No No   Sig: Take 1 tablet (20 mg) by mouth daily for 360 days   timolol maleate (TIMOPTIC) 0.5 % ophthalmic solution  Self Yes No   Sig: INSTILL 1 DROP INTO EACH EYE ONCE DAILY IN THE MORNING   vitamin D3 25 mcg (1000 units) tablet   No No   Sig: Take 1 tablet (25 mcg) by mouth every other day      Facility-Administered Medications: None        Review of Systems    The 10 point Review of Systems is negative other than noted in the HPI or here.      Physical Exam   Vital Signs: Temp: 98.2  F (36.8  C) Temp src: Oral BP: (!) 167/69 Pulse: 75   Resp: 18 SpO2: 100 % O2 Device: None (Room air)    Weight: 0 lbs 0 oz    General: Well appearing, nontoxic  HEENT: MMM  CV: RRR, no murmurs heard, extremities perfused, no lower extremity edema   Resp: breathing comfortably on room air, CTAB  Abd: soft, nondistended, mild tenderness around the abdominal incision staples. No redness around the abdominal incision. No swelling.   Neuro: moving all extremities, upper and lower extremity 5/5    Medical Decision Making       Please see A&P for additional details of medical decision making.      Data   ------------------------- PAST 24 HR DATA REVIEWED -----------------------------------------------

## 2024-05-01 NOTE — CONSULTS
Care Management Initial Consult    General Information  Assessment completed with: Patient,    Type of CM/SW Visit: Initial Assessment  Primary Care Provider verified and updated as needed: Yes (Pedro Gomez [phone] [address] [facility] [id number])   Readmission within the last 30 days: previous discharge plan unsuccessful   Return Category: New Diagnosis  Reason for Consult: utilization management concerns  Advance Care Planning: Advance Care Planning Reviewed: no concerns identified          Communication Assessment  Patient's communication style: spoken language (English or Bilingual)           Cognitive  Cognitive/Neuro/Behavioral: WDL                      Living Environment:   People in home: spouse  [place]  Current living Arrangements: house      Able to return to prior arrangements: yes       Family/Social Support:  Care provided by: self  Provides care for: no one  Marital Status:   , Children, Other (specify) (grandchildren and great grandchildren)  [place]       Description of Support System: Supportive, Involved    Support Assessment: Adequate family and caregiver support, Adequate social supports    Current Resources:   Patient receiving home care services: No (Following discharge from TCU, patient was to receive home PT/OT. Has not received those services yet.)  Community Resources: None  Equipment currently used at home: cane, straight, walker, rolling, grab bar, toilet, grab bar, tub/shower, shower chair  Supplies currently used at home: Incontinence Supplies    Employment/Financial:  Employment Status: retired     Employment/ Comments: worked at [employer]  Financial Concerns: none   Referral to Financial Worker: No       Does the patient's insurance plan have a 3 day qualifying hospital stay waiver?  No    Lifestyle & Psychosocial Needs:  Social Determinants of Health     Food Insecurity: Low Risk  (11/15/2023)    Food Insecurity     Within the past  "12 months, did you worry that your food would run out before you got money to buy more?: No     Within the past 12 months, did the food you bought just not last and you didn t have money to get more?: No   Depression: Not at risk (1/2/2024)    PHQ-2     PHQ-2 Score: 0   Housing Stability: Low Risk  (11/15/2023)    Housing Stability     Do you have housing? : Yes     Are you worried about losing your housing?: No   Tobacco Use: Medium Risk (4/11/2024)    Patient History     Smoking Tobacco Use: Former     Smokeless Tobacco Use: Never     Passive Exposure: Not on file   Financial Resource Strain: Low Risk  (11/15/2023)    Financial Resource Strain     Within the past 12 months, have you or your family members you live with been unable to get utilities (heat, electricity) when it was really needed?: No   Alcohol Use: Not on file   Transportation Needs: Low Risk  (11/15/2023)    Transportation Needs     Within the past 12 months, has lack of transportation kept you from medical appointments, getting your medicines, non-medical meetings or appointments, work, or from getting things that you need?: No   Physical Activity: Not on file   Interpersonal Safety: Low Risk  (11/15/2023)    Interpersonal Safety     Do you feel physically and emotionally safe where you currently live?: Yes     Within the past 12 months, have you been hit, slapped, kicked or otherwise physically hurt by someone?: No     Within the past 12 months, have you been humiliated or emotionally abused in other ways by your partner or ex-partner?: No   Stress: Not on file   Social Connections: Not on file   Health Literacy: Not on file       Functional Status:  Prior to admission patient needed assistance:   Dependent ADLs:: Ambulation-cane, Ambulation-walker (Per CMA completed 4/17/24 \"mostly able to grab things at home, uses cane sometimes like grocery shopping, uses walker for chair w/ extended outings\")  Dependent IADLs:: Independent  Assesssment of " "Functional Status: Not at baseline with ADL Functioning    Mental Health Status:  Mental Health Status: No Current Concerns       Chemical Dependency Status:  Chemical Dependency Status: No Current Concerns             Values/Beliefs:  Spiritual, Cultural Beliefs, Sabianism Practices, Values that affect care: no          Values/Beliefs Comment: Patient is Sikh    Additional Information:    SW met with patient at [facility] to complete initial assessment due to elevated risk score.     Patient was discharged to [facility] [place] on 4/22/2024. She discharged from the TCU yesterday, 4/30/2024. Patient says she felt \"wonderful\" leaving the TCU and had plans to set up home PT/OT services. Patient continues to use the same DME but states she made significant progress while at [facility]. Patient says that generally, she had a good experience at [facility] but would not go back there if the discharge recommendation was TCU during this hospital stay. Patient says if she were to discharge to a TCU after this stay, she does have one in mind but could not provide SW with the name of it.     Patient had a CMA completed on 4/17/2024 and then another done on 3/18/2024. Patient says nothing major has changed in her life other than her going to a TCU. Patient says she feels a little down about returning to the hospital after just briefly returning home before being hospitalized. SW expressed understanding with patient's feelings and empathized with her situation.    SW will remain available for discharge planning.    Elicia Han, MSW  5/1/2024       Social Work and Care Management Department       SEARCHABLE in [employer] - search SOCIAL WORK       [place] ([phone]) Saturday and Sunday     Units: 4A Vocera, 4C Vocera, & 4E Vocera    Pager: [phone]     Units: 5A 3107-5356 Vocera, 5A 8667-3726 Vocera , BMT SW 1 BMT SW 2, BMT SW 3 & BMT SW 4  5C Off Service 8727 - 7854  5C Off Service 2572-1328 Pager: " [phone]     Units: 6A Vocera & 6B Vocera  Pager: [phone]     Units: 6C Vocera Pager: [phone]     Units: 7A Vocera & 7B Vocera  Pager: [phone]     Units: 7C Med Surg 7401 thru 7418 and 7C Med Surg 7502 thru 7521  Pager: [phone]     Unit: [place] ED Vocera & [place] Obs Vocera Pager: [phone]      [facility] (2848-1246) Saturday and Sunday      Units: 5 Ortho Vocera, 5 Med Surg Vocera & WB ED Vocera Pager:[phone]     Units: 6 Med Surg Vocera, 8 Med Surg Vocera, & 10 ICU Vocera Pager: [phone]        After hours Vocera [facility] and After Hours Vocera [place]     Saturday & Sunday (1630 - 0000)    Mon-Fri (4595-9829)     [facility] Recognized Holidays  ([id number])    Units: ALL  Pager: [phone]

## 2024-05-01 NOTE — PLAN OF CARE
Goal Outcome Evaluation:      Plan of Care Reviewed With: patient    Overall Patient Progress: no changeOverall Patient Progress: no change     Care management assessment completed due to elevated risk score. SW to follow for discharge planning.

## 2024-05-01 NOTE — ED NOTES
Bed: ED21  Expected date:   Expected time:   Means of arrival:   Comments:  Christie Galeas [facility]F [facility] ETA 15

## 2024-05-01 NOTE — ED PROVIDER NOTES
[place] EMERGENCY DEPARTMENT ([facility])    5/01/24       ED PROVIDER NOTE    History     Chief Complaint   Patient presents with    GI Problem     HPI  Tessa Mansfiled is a 89 year old female with PMH notable for CAD s/p multiple stents, CKD, Afib on Apixaban, s/p cholecystectomy, s/p hysterectomy, SBO s/p lap (04/13) and RTOR for abdominal closure (04/14) who presents to the Emergency Department via EMS with melena x3 in the past 12 hours.  Patient reports that she is recovering well from her open small bowel obstruction repair and having normal eating, drinking, and bowel movement habits since discharge.  She was in a rehab facility and then just got home yesterday, and yesterday evening had dark black tarry stool.  She then had that again this morning, prompting her to come the emergency department for further evaluation.  While in the emergency department she had another black tarry stool bowel movement that was verified by myself and staff.  Patient denies chest pain but does endorse some mild shortness of breath and generalized weakness.  She denies any focal weakness or numbness.  She states she has not had any history of GI bleed in the past.  She is on Eliquis for atrial fibrillation, has not taken any of her home medications today.  Patient denies fevers or chills.  She denies any abdominal pain.  She does not have an appetite today and last ate yesterday evening.     By chart review, patient had a colonoscopy and endoscopy over the summer in 2023 which were negative for any ulcers, varices, or evidence of bleed.     Past Medical History  Past Medical History:   Diagnosis Date    CAD (coronary artery disease)     CAD s/p PCI+BMS to MRCA 10/2002, PCI to mLCX 2/2003, PCI+DESx2 to pLAD 11/2007, PCI+DESx1 to dRCA 12/2007, PCI+ALVAREZ to RPDA 7/2013; on indefinite DAPT  10/27/2002    10/27/2002: AMI s/p PCI+BMS (3.5x18mm Bx velocity) to mRCA 2/5/2003: Back pain; PCI+stent to mLCX c/b distal  embolization/slow flow 4/11/2003: Unstable angina; PCI+stent (Hepacoat velocity) to mLAD 11/27/2007: PCI+DESx2 to pLAD (IVUS w/ calcification of LMCA and ulcerated plaque pLAD, 80% dRCA; also had 80% dLCX, too small to stent) 12/11/2007: Staged PCI. PCI+DESx1 (3.5x13mm Cypher) to dRCA (indefinite DAPT recommended at this time) 6/27/2008: Angina. mLCX stent 70% ISR. LAD and RCA stents patent. Myocardium at risk from LCX felt to be small, medical management preferred to PCI. 11/10/2009: Angina. Findings unchanged from 6/27/2008, FFR LAD 0.90. Medical management recommended. 7/23/2013: Unstable angina; PCI+ALVAREZ to mPDA. Diagnostic findings: LMCA 40% distal. LAD: pLAD stents patent mLAD 30% ISR dLAD 50% diffuse, D2 diffuse disease. LCX 80% mid ISR. RCA diffuse <30% mid, RPLAs diffuse disease, RPDA 100% occlusion.      Cardiac Pacemaker- Medtronic, dual chamber- NOT dependant 11/28/2007    CKD (chronic kidney disease), stage IV (H)     Hyperlipidemia LDL goal <70     Hypertension     Stented coronary artery 10-    RCA    Stented coronary artery 2-5-2003    LCx    Stented coronary artery 4-    LAD    Stented coronary artery 11-    LAD    Stented coronary artery 12-    RCA    Transient complete heart block (H) 11/28/2007     Past Surgical History:   Procedure Laterality Date    CHOLECYSTECTOMY      CV CORONARY ANGIOGRAM N/A 6/17/2022    Procedure: Coronary Angiogram;  Surgeon: Constantine Macias MD;  Location: [facility] CARDIAC CATH LAB    CV CORONARY ANGIOGRAM N/A 7/17/2023    Procedure: Coronary Angiogram;  Surgeon: Constantine Macias MD;  Location: [facility] CARDIAC CATH LAB    CV PCI N/A 6/17/2022    Procedure: Percutaneous Coronary Intervention;  Surgeon: Constantine Macias MD;  Location: [facility] CARDIAC CATH LAB    CV PCI N/A 7/17/2023    Procedure: Percutaneous Coronary Intervention;  Surgeon: Constantine Macias MD;  Location: [facility] HEART CARDIAC CATH LAB    CV PCI N/A  11/6/2023    Procedure: Percutaneous Coronary Intervention;  Surgeon: Constantine Macias MD;  Location:  HEART CARDIAC CATH LAB    CV RIGHT HEART CATH MEASUREMENTS RECORDED N/A 6/17/2022    Procedure: Right Heart Catheterization;  Surgeon: Constantine Macias MD;  Location:  HEART CARDIAC CATH LAB    EP PACEMAKER N/A 10/15/2019    Procedure: EP PACEMAKER;  Surgeon: Anthony Zhu MD;  Location:  HEART CARDIAC CATH LAB    ESOPHAGOSCOPY, GASTROSCOPY, DUODENOSCOPY (EGD), COMBINED N/A 7/20/2023    Procedure: Esophagoscopy, gastroscopy, duodenoscopy (EGD), combined;  Surgeon: Bekah Dash DO;  Location:  GI    HC PPM INSERTION NEW/REPLACEMENT W/ ATRIAL&VENTRICULAR LEAD  11-    HYSTERECTOMY      LAPAROTOMY EXPLORATORY N/A 4/13/2024    Procedure: Laparotomy exploratory, Wound Vac Placement.;  Surgeon: Anthony Trammell MD;  Location: [facility] OR    LAPAROTOMY EXPLORATORY N/A 4/14/2024    Procedure: Abdominal Re-Exploration and Closure;  Surgeon: Anthony Trammell MD;  Location: [facility] OR    LAPAROTOMY EXPLORATORY N/A 4/13/2024    Procedure: Exploratory Laparotomy;  Surgeon: Anthony Trammell MD;  Location: [facility] OR     allopurinol (ZYLOPRIM) 100 MG tablet  amLODIPine (NORVASC) 10 MG tablet  apixaban ANTICOAGULANT (ELIQUIS) 2.5 MG tablet  budesonide (PULMICORT) 0.5 MG/2ML neb solution  calcium carbonate 500 mg, elemental, (OSCAL 500) 1250 (500 Ca) MG TABS tablet  clopidogrel (PLAVIX) 75 MG tablet  cyanocolbalamin (VITAMIN B-12) 1000 MCG tablet  FEROSUL 325 (65 Fe) MG tablet  fluticasone-salmeterol (ADVAIR) 250-50 MCG/ACT inhaler  furosemide (LASIX) 40 MG tablet  HYDROmorphone (DILAUDID) 2 MG tablet  isosorbide mononitrate (ISMO/MONOKET) 20 MG tablet  methocarbamol (ROBAXIN) 500 MG tablet  metoprolol tartrate (LOPRESSOR) 50 MG tablet  Multiple Vitamins-Minerals (PRESERVISION AREDS 2+MULTI VIT PO)  omeprazole (PRILOSEC) 40 MG DR capsule  potassium chloride ER (K-TAB/KLOR-CON) 10 MEQ CR  "tablet  psyllium (METAMUCIL/KONSYL) Packet  rosuvastatin (CRESTOR) 20 MG tablet  timolol maleate (TIMOPTIC) 0.5 % ophthalmic solution  vitamin D3 25 mcg (1000 units) tablet      Allergies   Allergen Reactions    Ciprofloxacin Rash    Codeine Sulfate Itching    Shrimp Swelling    Bactrim [Sulfamethoxazole W/Trimethoprim]      Patient unable to recall    Biaxin [Clarithromycin]     Chlorthalidone Nausea and Vomiting    Clonidine     Darvon [Propoxyphene Hcl]     Dilaudid [Hydromorphone] Visual Disturbance and Hallucination    Gabapentin Fatigue and Confusion     \"felt drunk\"    Indomethacin     Levaquin [Levofloxacin Hemihydrate]     Morphine Sulfate     Percocet [Oxycodone-Acetaminophen] Hallucination    Pregabalin Itching and Fatigue    Simvastatin Palpitations     Muscle weakness, leg cramping      Spironolactone      Dehydrated      Terazosin      Family History  Family History   Problem Relation Age of Onset    Cancer Sister 82        bladder cancer     Social History   Social History     Tobacco Use    Smoking status: Former     Current packs/day: 0.30     Average packs/day: 0.3 packs/day for 15.0 years (4.5 ttl pk-yrs)     Types: Cigarettes    Smokeless tobacco: Never    Tobacco comments:     Quit 35+ years ago   Substance Use Topics    Drug use: No         A complete review of systems was performed with pertinent positives and negatives noted in the HPI, and all other systems negative.    Physical Exam   BP: (!) 167/69  Pulse: 75  Temp: 98.2  F (36.8  C)  Resp: 18  Height: 167.6 cm (5' 6\")  SpO2: 100 %  Physical Exam  Vitals and nursing note reviewed.   Constitutional:       General: She is not in acute distress.     Appearance: Normal appearance. She is not diaphoretic.   HENT:      Head: Normocephalic and atraumatic.      Mouth/Throat:      Mouth: Mucous membranes are moist.      Pharynx: Oropharynx is clear.   Eyes:      General: No scleral icterus.     Conjunctiva/sclera: Conjunctivae normal. "   Cardiovascular:      Rate and Rhythm: Normal rate and regular rhythm.      Heart sounds: Normal heart sounds.   Pulmonary:      Effort: No respiratory distress.      Breath sounds: Normal breath sounds.   Abdominal:      General: Abdomen is flat.      Palpations: Abdomen is soft.      Tenderness: There is abdominal tenderness. There is no guarding or rebound.      Comments: Surgical incision site with staples intact, no skin erythema or fluid drainage on palpation; appropriate tenderness without rebound or guarding   Musculoskeletal:      Cervical back: Normal range of motion and neck supple.   Skin:     General: Skin is warm and dry.      Findings: No rash.   Neurological:      Mental Status: She is alert and oriented to person, place, and time.      Sensory: No sensory deficit.      Motor: No weakness.      Gait: Gait normal.   Psychiatric:         Mood and Affect: Mood normal.         Behavior: Behavior normal.             ED Course, Procedures, & Data      Procedures       A consult was attained from the Gastroenterology lumen service. The case was discussed with on call fellow from that service. The consulting service's recommendations were provided promptly.      Results for orders placed or performed during the hospital encounter of 05/01/24   CT Abdomen Pelvis w/o Contrast     Status: None (Preliminary result)    Impression    RESIDENT PRELIMINARY INTERPRETATION  IMPRESSION:   1. Postsurgical changes of the abdomen with ventral abdominal wall  incision. Deep to the incision, there is fat stranding about the  omentum with findings suggestive of a small omental infarct. No focal  fluid collection.  2. No abnormally dilated loops of bowel. No evidence of recurrent  bowel obstruction.  3. Trace free fluid in the pelvis. No free air.   Comprehensive metabolic panel     Status: Abnormal   Result Value Ref Range    Sodium 142 135 - 145 mmol/L    Potassium 3.7 3.4 - 5.3 mmol/L    Carbon Dioxide (CO2) 20 (L) 22 -  29 mmol/L    Anion Gap 17 (H) 7 - 15 mmol/L    Urea Nitrogen 43.3 (H) 8.0 - 23.0 mg/dL    Creatinine 1.77 (H) 0.51 - 0.95 mg/dL    GFR Estimate 27 (L) >60 mL/min/1.73m2    Calcium 8.8 8.8 - 10.2 mg/dL    Chloride 105 98 - 107 mmol/L    Glucose 93 70 - 99 mg/dL    Alkaline Phosphatase 72 40 - 150 U/L    AST 42 0 - 45 U/L    ALT 23 0 - 50 U/L    Protein Total 6.7 6.4 - 8.3 g/dL    Albumin 3.6 3.5 - 5.2 g/dL    Bilirubin Total 0.5 <=1.2 mg/dL   INR     Status: Abnormal   Result Value Ref Range    INR 1.41 (H) 0.85 - 1.15   PTT     Status: Normal   Result Value Ref Range    aPTT 38 22 - 38 Seconds   CBC with platelets and differential     Status: Abnormal   Result Value Ref Range    WBC Count 5.5 4.0 - 11.0 10e3/uL    RBC Count 2.98 (L) 3.80 - 5.20 10e6/uL    Hemoglobin 8.1 (L) 11.7 - 15.7 g/dL    Hematocrit 26.1 (L) 35.0 - 47.0 %    MCV 88 78 - 100 fL    MCH 27.2 26.5 - 33.0 pg    MCHC 31.0 (L) 31.5 - 36.5 g/dL    RDW 19.1 (H) 10.0 - 15.0 %    Platelet Count 189 150 - 450 10e3/uL    % Neutrophils 40 %    % Lymphocytes 34 %    % Monocytes 24 %    % Eosinophils 1 %    % Basophils 1 %    % Immature Granulocytes 0 %    NRBCs per 100 WBC 0 <1 /100    Absolute Neutrophils 2.2 1.6 - 8.3 10e3/uL    Absolute Lymphocytes 1.9 0.8 - 5.3 10e3/uL    Absolute Monocytes 1.3 0.0 - 1.3 10e3/uL    Absolute Eosinophils 0.0 0.0 - 0.7 10e3/uL    Absolute Basophils 0.1 0.0 - 0.2 10e3/uL    Absolute Immature Granulocytes 0.0 <=0.4 10e3/uL    Absolute NRBCs 0.0 10e3/uL   RBC and Platelet Morphology     Status: None   Result Value Ref Range    Platelet Assessment  Automated Count Confirmed. Platelet morphology is normal.     Automated Count Confirmed. Platelet morphology is normal.    Acanthocytes      Mariah Rods      Basophilic Stippling      Bite Cells      Blister Cells      [place] Cells      Elliptocytes      Hgb C Crystals      Weber-Jolly Bodies      Hypersegmented Neutrophils      Polychromasia      RBC agglutination      RBC Fragments       Reactive Lymphocytes      Rouleaux      Sickle Cells      Smudge Cells      Spherocytes      Stomatocytes      Target Cells      Teardrop Cells      Toxic Neutrophils      RBC Morphology Confirmed RBC Indices    Adult Type and Screen     Status: None   Result Value Ref Range    ABO/RH(D) O NEG     Antibody Screen Negative Negative    SPECIMEN EXPIRATION DATE [id number]    CBC with Platelets & Differential     Status: Abnormal    Narrative    The following orders were created for panel order CBC with Platelets & Differential.  Procedure                               Abnormality         Status                     ---------                               -----------         ------                     CBC with platelets and d...[[id number]]  Abnormal            Final result               RBC and Platelet Morphology[[id number]]                      Final result                 Please view results for these tests on the individual orders.   ABO/Rh type and screen     Status: None    Narrative    The following orders were created for panel order ABO/Rh type and screen.  Procedure                               Abnormality         Status                     ---------                               -----------         ------                     Adult Type and Screen[[id number]]                            Final result                 Please view results for these tests on the individual orders.     Medications   pantoprazole (PROTONIX) IV push injection 40 mg (40 mg Intravenous $Given 5/1/24 124)     Labs Ordered and Resulted from Time of ED Arrival to Time of ED Departure   COMPREHENSIVE METABOLIC PANEL - Abnormal       Result Value    Sodium 142      Potassium 3.7      Carbon Dioxide (CO2) 20 (*)     Anion Gap 17 (*)     Urea Nitrogen 43.3 (*)     Creatinine 1.77 (*)     GFR Estimate 27 (*)     Calcium 8.8      Chloride 105      Glucose 93      Alkaline Phosphatase 72      AST 42      ALT 23      Protein Total 6.7       Albumin 3.6      Bilirubin Total 0.5     INR - Abnormal    INR 1.41 (*)    CBC WITH PLATELETS AND DIFFERENTIAL - Abnormal    WBC Count 5.5      RBC Count 2.98 (*)     Hemoglobin 8.1 (*)     Hematocrit 26.1 (*)     MCV 88      MCH 27.2      MCHC 31.0 (*)     RDW 19.1 (*)     Platelet Count 189      % Neutrophils 40      % Lymphocytes 34      % Monocytes 24      % Eosinophils 1      % Basophils 1      % Immature Granulocytes 0      NRBCs per 100 WBC 0      Absolute Neutrophils 2.2      Absolute Lymphocytes 1.9      Absolute Monocytes 1.3      Absolute Eosinophils 0.0      Absolute Basophils 0.1      Absolute Immature Granulocytes 0.0      Absolute NRBCs 0.0     PARTIAL THROMBOPLASTIN TIME - Normal    aPTT 38     RBC AND PLATELET MORPHOLOGY    Platelet Assessment        Value: Automated Count Confirmed. Platelet morphology is normal.    Acanthocytes        Mariah Rods        Basophilic Stippling        Bite Cells        Blister Cells        [place] Cells        Elliptocytes        Hgb C Crystals        Weber-Jolly Bodies        Hypersegmented Neutrophils        Polychromasia        RBC agglutination        RBC Fragments        Reactive Lymphocytes        Rouleaux        Sickle Cells        Smudge Cells        Spherocytes        Stomatocytes        Target Cells        Teardrop Cells        Toxic Neutrophils        RBC Morphology Confirmed RBC Indices     TYPE AND SCREEN, ADULT    ABO/RH(D) O NEG      Antibody Screen Negative      SPECIMEN EXPIRATION DATE [id number]     ABO/RH TYPE AND SCREEN     CT Abdomen Pelvis w/o Contrast   Preliminary Result   RESIDENT PRELIMINARY INTERPRETATION   IMPRESSION:    1. Postsurgical changes of the abdomen with ventral abdominal wall   incision. Deep to the incision, there is fat stranding about the   omentum with findings suggestive of a small omental infarct. No focal   fluid collection.   2. No abnormally dilated loops of bowel. No evidence of recurrent   bowel obstruction.   3. Trace  free fluid in the pelvis. No free air.             Critical care was not performed.     Medical Decision Making  The patient's presentation was of high complexity (an acute health issue posing potential threat to life or bodily function).    The patient's evaluation involved:  review of external note(s) from 1 sources (see separate area of note for details)  review of 3+ test result(s) ordered prior to this encounter (see separate area of note for details)  ordering and/or review of 3+ test(s) in this encounter (see separate area of note for details)  discussion of management or test interpretation with another health professional (see separate area of note for details)    The patient's management necessitated high risk (a decision regarding hospitalization).    Assessment & Plan    Tessa Mansfield is a 89 year old female with PMH notable for CAD s/p multiple stents, CKD, Afib on Apixaban, s/p cholecystectomy, s/p hysterectomy, SBO s/p lap (04/13) and RTOR for abdominal closure (04/14) who presents to the Emergency Department via EMS with melena x3 in the past 12 hours.  Patient had melena in the department that was confirmed by myself and staff.  Patient arrives and appears nontoxic, pale, hypertensive to 167/69, and has other vital signs within normal limits.  She is ambulatory in the department without syncope or near syncope.  Lab work obtained and demonstrates stable hemoglobin from prior at 8.1 today, however she does have an elevated BUN/creatinine ratio, and has had melanotic stool at home and here, concerning for impending drop in hemoglobin.  Type and screen was obtained and we discussed with patient that she may need blood transfusion while here though not emergently at this moment.  I consulted gastroenterology service who agreed with plan for admission for further observation and consideration of endoscopy, as well as recommendation of initiating Protonix.  Protonix was started 40 mg twice daily after  discussing dosing strategy with pharmacy here.  CT abdomen pelvis obtained given patient's close proximity with abdominal surgery 2 weeks ago and new onset GI bleed to assess for any evidence of ischemic bowel or other etiologies of new onset bleed.  This came back relatively unremarkable with no clear surgical emergencies.  Patient admitted to medicine for further care.     I have reviewed the nursing notes. I have reviewed the findings, diagnosis, plan and need for follow up with the patient.    New Prescriptions    No medications on file       Final diagnoses:   UGIB (upper gastrointestinal bleed)   Melena       Olvin Duque MD  [facility] EMERGENCY DEPARTMENT  5/1/2024        Olvin Duque MD  05/01/24 1323       Olvin Duque MD  05/01/24 1320

## 2024-05-01 NOTE — TELEPHONE ENCOUNTER
Nurse Triage SBAR    Is this a 2nd Level Triage? YES, LICENSED PRACTITIONER REVIEW IS REQUIRED    Situation: Black stool noted last night and again this AM.  -Smelly and watery  - no previous black stool noted. No red blood noted in toilet.  -Abdominal pain no more than usual post op ex-lap on 4/13/24 [facility], reexploration 4/14/24  - she is feeling quite weak, rapid pulse, anxious.  Last Hgb on 4/26/24=8.1( discharge HGB 8.9)   -Needs ambulance now and requests assistance.  - 911 contacted by writer for patient 0912 AM.  - is home with her and Tessa confirmed address  [address] [place] [address], ([facility] )    -Given home address/ contact information, garage door is best entrance, this is unlocked/ opened.  -On Eliquis 2.5 mg at discharge 4/22/24 and has been taking this dosage.( Restarted 4/20 per chart review)    Background: Discharge summary 4/13/24-4/22/24 [facility]   Tessa Mansfield is a 89 year old female with a history of cholecystectomy and hysterectomy, CAD s/p multiple stents, CKD, Afib with Apixaban, reversed in the OR with PCC, who was admitted on 4/13/2024 with abdominal pain and clinical signs of bowel obstruction with possible ischemia.   Patient was sent to OR for emergent ex lap( 4/13/24> 1.5 litres of blood in abdomen).        Recent Labs 4/26/24   Lab Test 04/26/24  0446   WBC 8.3   RBC 2.95*   HGB 8.1*   HCT 26.4*   MCV 90   MCH 27.5   MCHC 30.7*   RDW 19.8*       Last Comprehensive Metabolic Panel:  Lab Results   Component Value Date     04/26/2024    POTASSIUM 3.4 04/26/2024    CHLORIDE 107 04/26/2024    CO2 24 04/26/2024    ANIONGAP 13 04/26/2024    GLC 78 04/26/2024    BUN 17.2 04/26/2024    CR 1.86 (H) 04/26/2024    GFRESTIMATED 26 (L) 04/26/2024    ALEXANDRO 8.4 (L) 04/26/2024        Assessment: 911 now    Protocol Recommended Disposition:   911    Recommendation: OLIVERIO Trammell [facility] General surgery post op appt on 5/8 at 0845.   Routed to  provider    Does the patient meet one of the following criteria for ADS visit consideration? No    Reason for Disposition   Sounds like a life-threatening emergency to the triager    Protocols used: Post-Op Symptoms and [id number]

## 2024-05-01 NOTE — CONSULTS
Gastroenterology Consultation  GI Luminal Service    Date of Admission:  5/1/2024  Reason for Admission: Acute on Chronic Anemia, Melena  Date of Consult  5/1/2024   Requesting Physician:  Olvin Duque MD           ASSESSMENT AND RECOMMENDATIONS:   Assessment:  Tessa Mansfield is a pleasant 89 year old female with a history of chronic anemia, CAD with history of multiple stents (most recently 11/6/2023 LAD PCI) on Apixaban and Clopidogrel, sick sinus syndrome status-post dual-chamber PPM, paroxysmal atrial fibrillation, mild-moderate mitral regurgitation, moderate tricuspid regurgitation, 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 hypertensive nephrosclerosis, hypertension, chronic cough, history of bleeding colonic angiodysplastic lesion in the ascending colon treated with clips (MR conditional) and many polyps in the ascending and transverse colon not removed in the setting of GI bleed 7/21/2023, diverticulosis in the entire examined colon, recent admission 4/13-4/22/2024 for small bowel obstruction status-post exploratory laparotomy x3 with small bowel torsion around adhesions status-post adhesiolysis and detorsion with abdominal closure who presented to the ED on 5/1/2024 with reported melena on Clopidogrel (Plavix) and Apixaban (Eliquis), for which the GI Luminal Service was consulted.     Patient had a recent admission 4/13/2024-4/22/2024 at [facility] for small bowel obstruction status-post exploratory laparotomy 4/13/2024 with hemorrhagic bowel without necrosis, multiple areas small bowel torsion around several small adhesions status-post adhesiolysis and transverse colon epiploicae complicated by bleeding with return to the operating room 4/13/2024 for exploratory laparotomy found to have slow ooze from suture line along inferior aspect of the transverse meso colon (site of prior lysis of adhesion) that was controlled with additional suture, quick clot gauze and an Abthera device.  Patient was taken  back to the OR on 4/14/2024 for abdominal re-exploration and closure that reports healthy bowel, small amount of bleeding from an area on the proximal jejunum where adhesions had been taken down, minimal clot in the abdomen, removal of quick-clot gauze, and abdominal midline wound closure. Patient was discharge to TCU on 4/22/2024.       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 At that time, it was recommended to discuss with cardiology on resumption of antiplatelets and Eliquis, as from a GI bleed perspective, it would have been beneficial to hold Eliquis temporarily if not permanently.     Presented 5/1/2024 with 2 episodes of melena. After previous procedures in July 2023, patient denies episodes of overt GI bleeding prior this these episodes of melena overnight and today.  - Hemodynamically stable.   - Hemoglobin stable at 8.1 g/dL (5/1) <-- 8.1 g/dL (4/26).   - Azotemia with elevated BUN:Creatine ratio, concerning for upper GI source of bleeding as source of melena.   - History of iron deficiency; however, patient denies PTA oral iron.   - Last dose of Plavix and Eliquis was last evening 4/30/2024.      Upper GI bleeding differential includes most likely AVMs/angiodysplasias (either upper GI  versus small bowel AVMs versus right colon leading to melena), potentially erosive esophagitis/ulcer, PUD, gastritis/gastropathy, duodenitis; less likely dieulafoy's lesion given hemodynamic stability and stable hemoglobin (clinical picture not consistent); less likely neoplastic disease given recent EGD July 2023 without concerning upper GI lesions.     Plan for EGD tomorrow 5/2/2024 under MAC sedation for further evaluation. Okay for clear liquid diet (no red, no purple) today with NPO at midnight.       Recommendations:  -- Pantoprazole 80 mg IV push followed by Pantoprazole 40 mg IV BID.   -- Okay for clear liquid diet (no red, no purple) today with NPO at midnight for planned EGD tomorrow 5/2/2024 at ~1200 under MAC sedation.   -- Please continue to hold Plavix and Eliquis for the GI endoscopy.     -- Hematologic anemia work-up per primary team. --> concern for ongoing iron deficiency not on iron supplementation PTA, ?Vitamin B12/folate deficiency with normocytic anemia despite significant iron deficiency on most recent iron studies.   - Consider IV iron infusion(s) while inpatient for iron repletion.     -- Consider General Surgery consult, re: CT Impression 5/1/2024: Postsurgical changes of the abdomen with ventral abdominal wall incision. Immediately deep to the incision and rectus abdominis musculature, there is stranding associated with lobules of fat, likely evolving postsurgical edema and/or fat necrosis. Soft tissue thickening in the transverse mesocolon and small amount of blood products in the right central mesentery, also likely evolving postsurgical change. No convincing evidence of acute hemorrhage or drainable fluid collection on this noncontrast CT.    -- Maintain 2 large bore IVs, 18G or larger.  -- Continue Supportive Cares  - Adequate volume resuscitation with IVF, pRBCs.  - Monitor Hemoglobin closely. Transfuse to keep Hgb > 7 g/dL from GI standpoint.   - Monitor Platelets closely. Keep PLT >  50 10e3/uL from GI standpoint.  - Maintain hemodynamics: MAP >65 mmHg and HR <100.  - Monitor and optimize electrolytes.  - Reposition every 30 minutes while awake.  - Encourage Ambulation as able: 4-6 walks per day.   -- Continue to hold heparin/anticoagulation/antiplatelets.    - Monitor INR. Goal INR ~ 1.5 for GI endoscopy.   -- Avoid NSAIDs.  -- Analgesics/Antiemetics per primary team.  -- If the patient experiences overt GI bleeding with hemodynamic instability, please page the GI Luminal Service (listed on [facility]).       COVID status: not tested this admission.     Discussed with ED Physician Dr. Olvin Duque and Dr. Sabrina Torres - Medicine [facility] Resident.     Thank you for involving us in this patient's care. Please do not hesitate to contact the GI service with any questions or concerns.     The patient was discussed and plan agreed upon with Dr. Jose Donaldson, GI Luminal Service staff physician.    Overall time spent on the date of this encounter preparing to see the patient (including chart review of available notes, clinical status events, imaging and labs); discussing, ordering, coordinating recommended medications, tests or procedures; communicating with other health care professionals; and documenting the above clinical information in the electronic medical record was 95 minutes.    Brianna Michael PA-C  Inpatient Gastroenterology Service  [facility]  Text Page         History of Present Illness:   Patient seen and examined at 1300. History is obtained from patient and chart review.    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       Patient reports she was just discharged yesterday from TCU, got home at 11 am. [place] well yesterday. Woke up overnight last evening to have a bowel movement with black stool when wiping. Had another bowel movement around 0730 this morning that was also black, tarry, sticky.     Denies nausea, vomiting, acid reflux, heartburn.    Reports still a little bit of post-surgical abdominal pain, getting better day by day. Still has incisional staples that patient reports are planned to be removed next week.     Patient denies NSAID use. Reports no overt GI bleeding since the previous GI endoscopies until these 2 episodes of melena.     Last took plavix and Eliquis last evening 4/30/2024 PM.     Discussed concern for upper GI source of bleeding. Discussed EGD for further evaluation. Patient is amenable. Plan for EGD tomorrow.         Previous Procedures:    7/21/2023 - Colonoscopy - Dr. Bekah Dash  Indications:           Hematochezia, Acute post hemorrhagic anemia   Impression:            - One bleeding colonic angiodysplastic lesion in the                          ascending colon. Clips (MR conditional) were placed.                          - Many polyps in the ascending and transverse colon,                          not removed given GI bleed and her  age.                          - Diverticulosis in the entire examined colon.                          - No specimens collected.   Recommendation:        - Resume previous diet today.                          - Can discuss risk/benefit of repeat colonoscopy with                          polypectomy in the future as an outpatient                          - Please discuss with cardiology on resumption of                          antiplatelets and Eliquis. From GI bleed perspective,                          it will be beneficial to hold Eliquis at least                          temporarily if not permanently.                          - Can discontinue PPI.     7/20/2023 - EGD - Dr. Bekah Dash  Indications:           Acute post hemorrhagic anemia, Melena   Impression:            - No active bleeding or blood products visualized.                          - Normal esophagus.                          - Erythematous mucosa in the gastric body.                          - Normal duodenal bulb, second portion of the duodenum                          and third portion of the duodenum.                          - No specimens collected.   Recommendation:        - Clear liquid diet today.                          - Will discuss with patient whether colonoscopy will                          be within her goals of care.       4/5/2006 - EGD - Dr. Madhuri Haji  Indications:    Follow-up of acute gastric ulcer with hemorrhage   Impression:     - Normal esophagus.                   - Normal stomach.                   - Normal examined duodenum.   Recommendation: - Use Protonix (pantoprazole) at 40 mg by mouth daily                   indefinitely.       1/31/2006 - EGD - Dr. Star Nye  Indications:         Melena   Impression:          - Normal esophagus.                        - Gastric ulcers marginally oozing blood. Biopsied.                        - Normal duodenal bulb and 2nd part of the duodenum.   Recommendation:      -  Return to referring provider today. Avoid Plavix.                        Ideally, would be off ASA. Maximal PPI.   FINAL DIAGNOSIS:  Stomach, body (biopsy of ulcer):  - Antral and transitional mucosa with active chronic gastritis.  - Erosion of the surface epithelium and focal adherent  fibrinoinflammatory debris, suggestive of nearby ulceration.  - No Helicobacter pylori is identified (immunostains for H. pylori are  negative).       1/31/2006 - Colonoscopy - Dr. Star Nye  Indications:         Melena of unknown origin   Impression:          - External and internal, non bleeding, mild hemorrhoids                        were found.                        - Diverticulosis.                        - The terminal ileum is normal.   Recommendation:      - Perform upper GI endoscopy today.       1/30/2006 - EGD - Dr. Bernard Vieira  Indications:         Melena   Impression:          - Normal esophagus.                        - Red blood in the gastric fundus.                        - Normal examined duodenum.                 Past Medical History:   Reviewed and edited as appropriate  Past Medical History:   Diagnosis Date    CAD (coronary artery disease)     CAD s/p PCI+BMS to MRCA 10/2002, PCI to mLCX 2/2003, PCI+DESx2 to pLAD 11/2007, PCI+DESx1 to dRCA 12/2007, PCI+ALVAREZ to RPDA 7/2013; on indefinite DAPT  10/27/2002    10/27/2002: AMI s/p PCI+BMS (3.5x18mm Bx velocity) to mRCA 2/5/2003: Back pain; PCI+stent to mLCX c/b distal embolization/slow flow 4/11/2003: Unstable angina; PCI+stent (Hepacoat velocity) to mLAD 11/27/2007: PCI+DESx2 to pLAD (IVUS w/ calcification of LMCA and ulcerated plaque pLAD, 80% dRCA; also had 80% dLCX, too small to stent) 12/11/2007: Staged PCI. PCI+DESx1 (3.5x13mm Cypher) to dRCA (indefinite DAPT recommended at this time) 6/27/2008: Angina. mLCX stent 70% ISR. LAD and RCA stents patent. Myocardium at risk from LCX felt to be small, medical management preferred to PCI. 11/10/2009: Angina.  Findings unchanged from 6/27/2008, FFR LAD 0.90. Medical management recommended. 7/23/2013: Unstable angina; PCI+ALVAREZ to mPDA. Diagnostic findings: LMCA 40% distal. LAD: pLAD stents patent mLAD 30% ISR dLAD 50% diffuse, D2 diffuse disease. LCX 80% mid ISR. RCA diffuse <30% mid, RPLAs diffuse disease, RPDA 100% occlusion.      Cardiac Pacemaker- Medtronic, dual chamber- NOT dependant 11/28/2007    CKD (chronic kidney disease), stage IV (H)     Hyperlipidemia LDL goal <70     Hypertension     Stented coronary artery 10-    RCA    Stented coronary artery 2-5-2003    LCx    Stented coronary artery 4-    LAD    Stented coronary artery 11-    LAD    Stented coronary artery 12-    RCA    Transient complete heart block (H) 11/28/2007            Past Surgical History:   Reviewed and edited as appropriate   Past Surgical History:   Procedure Laterality Date    CHOLECYSTECTOMY      CV CORONARY ANGIOGRAM N/A 6/17/2022    Procedure: Coronary Angiogram;  Surgeon: Constantine Macias MD;  Location: [facility] CARDIAC CATH LAB    CV CORONARY ANGIOGRAM N/A 7/17/2023    Procedure: Coronary Angiogram;  Surgeon: Constantine Macias MD;  Location:  HEART CARDIAC CATH LAB    CV PCI N/A 6/17/2022    Procedure: Percutaneous Coronary Intervention;  Surgeon: Constantine Macias MD;  Location: [facility] CARDIAC CATH LAB    CV PCI N/A 7/17/2023    Procedure: Percutaneous Coronary Intervention;  Surgeon: Constantine Macias MD;  Location:  HEART CARDIAC CATH LAB    CV PCI N/A 11/6/2023    Procedure: Percutaneous Coronary Intervention;  Surgeon: Constantine Macias MD;  Location: [facility] CARDIAC CATH LAB    CV RIGHT HEART CATH MEASUREMENTS RECORDED N/A 6/17/2022    Procedure: Right Heart Catheterization;  Surgeon: Constantine Macias MD;  Location: [facility] CARDIAC CATH LAB    EP PACEMAKER N/A 10/15/2019    Procedure: EP PACEMAKER;  Surgeon: Anthony Zhu MD;  Location: [facility] CARDIAC CATH  LAB    ESOPHAGOSCOPY, GASTROSCOPY, DUODENOSCOPY (EGD), COMBINED N/A 7/20/2023    Procedure: Esophagoscopy, gastroscopy, duodenoscopy (EGD), combined;  Surgeon: Bekah Dash DO;  Location: [facility] GI    HC PPM INSERTION NEW/REPLACEMENT W/ ATRIAL&VENTRICULAR LEAD  11-    HYSTERECTOMY      LAPAROTOMY EXPLORATORY N/A 4/13/2024    Procedure: Laparotomy exploratory, Wound Vac Placement.;  Surgeon: Anthony Trammell MD;  Location: [facility] OR    LAPAROTOMY EXPLORATORY N/A 4/14/2024    Procedure: Abdominal Re-Exploration and Closure;  Surgeon: Anthony Trammell MD;  Location: [facility] OR    LAPAROTOMY EXPLORATORY N/A 4/13/2024    Procedure: Exploratory Laparotomy;  Surgeon: Anthony Trammell MD;  Location: [facility] OR       Per 1/2/2024 Cardiology Office Visit:   Summary of CAD history:  10/27/2002: AMI s/p PCI+BMS (3.5x18mm Bx velocity) to mRCA  2/5/2003: Back pain; PCI+stent to mLCX c/b distal embolization/slow flow  4/11/2003: Unstable angina; PCI+stent (Hepacoat velocity) to mLAD  11/27/2007: PCI+DESx2 to pLAD (IVUS w/ calcification of LMCA and ulcerated plaque pLAD, 80% dRCA; also had 80% dLCX, too small to stent)  12/11/2007: Staged PCI. PCI+DESx1 (3.5x13mm Cypher) to dRCA (indefinite DAPT recommended at this time)  6/27/2008: Angina. mLCX stent 70% ISR. LAD and RCA stents patent. Myocardium at risk from LCX felt to be small, medical management preferred to PCI.  11/10/2009: Angina. Findings unchanged from 6/27/2008, FFR LAD 0.90. Medical management recommended.  7/23/2013: Unstable angina; PCI+ALVAREZ to mPDA. Diagnostic findings: LMCA 40% distal. LAD: pLAD stents patent mLAD 30% ISR dLAD 50% diffuse, D2 diffuse disease. LCX 80% mid ISR. RCA diffuse <30% mid, RPLAs diffuse disease, RPDA 100% occlusion.   5/27/2022: 90% dRCA in-stent -> PCI+DESx1 to dRCA.  50-60% pRCA (return for hemodynamic assessment if symptoms persist/recur) 50-60% small LCx (medical management recommended)  7/18/23: 80% ISR of dRCA --> PCI w/ALVAREZ x2,  "RPDA-2 lesion is 50% stenosed, RPDA-1 lesion is 65% stenosed, Ost LAD to Prox LAD lesion is 50% stenosed, Prox LAD to Mid LAD lesion is 40% stenosed, mLAD 70% stenosed, prox Cx to Mid Cx lesion is 55% stenosed small vessel.  23: LAD PCI            Social History:   The patient lives in [place], [place].     Alcohol: Denies.   Tobacco: Former cigarette smoker, quit 30+ years ago.   Illicit drugs: Denies.            Family History:   Patient's family history is reviewed today and is non-contributory    Family History   Problem Relation Age of Onset    Cancer Sister 82        bladder cancer    -- Parent  of lymphoma.          Allergies:   Reviewed and edited as appropriate     Allergies   Allergen Reactions    Ciprofloxacin Rash    Codeine Sulfate Itching    Shrimp Swelling    Bactrim [Sulfamethoxazole W/Trimethoprim]      Patient unable to recall    Biaxin [Clarithromycin]     Chlorthalidone Nausea and Vomiting    Clonidine     Darvon [Propoxyphene Hcl]     Dilaudid [Hydromorphone] Visual Disturbance and Hallucination    Gabapentin Fatigue and Confusion     \"felt drunk\"    Indomethacin     Levaquin [Levofloxacin Hemihydrate]     Morphine Sulfate     Percocet [Oxycodone-Acetaminophen] Hallucination    Pregabalin Itching and Fatigue    Simvastatin Palpitations     Muscle weakness, leg cramping      Spironolactone      Dehydrated      Terazosin             Medications:     Current Facility-Administered Medications   Medication Dose Route Frequency Provider Last Rate Last Admin    pantoprazole (PROTONIX) IV push injection 40 mg  40 mg Intravenous BID Olvin Duque MD         Current Outpatient Medications   Medication Sig Dispense Refill    allopurinol (ZYLOPRIM) 100 MG tablet Take 1 tablet (100 mg) by mouth daily 90 tablet 0    amLODIPine (NORVASC) 10 MG tablet Take 1 tablet (10 mg) by mouth daily 90 tablet 3    apixaban ANTICOAGULANT (ELIQUIS) 2.5 MG tablet Take 1 tablet (2.5 mg) by mouth 2 times daily " 180 tablet 3    budesonide (PULMICORT) 0.5 MG/2ML neb solution Take 2 mLs (0.5 mg) by nebulization 2 times daily 2 mL 3    calcium carbonate 500 mg, elemental, (OSCAL 500) 1250 (500 Ca) MG TABS tablet Take 1 tablet by mouth daily      clopidogrel (PLAVIX) 75 MG tablet Take 1 tablet (75 mg) by mouth daily 90 tablet 1    cyanocolbalamin (VITAMIN B-12) 1000 MCG tablet Take 500 mcg by mouth daily As directed      FEROSUL 325 (65 Fe) MG tablet TAKE 1 TABLET BY MOUTH EVERY OTHER DAY 45 tablet 0    fluticasone-salmeterol (ADVAIR) 250-50 MCG/ACT inhaler INHALE 1 DOSE BY MOUTH TWICE DAILY 60 each 8    furosemide (LASIX) 40 MG tablet Take 1 tablet (40 mg) by mouth daily 30 tablet 0    HYDROmorphone (DILAUDID) 2 MG tablet Take 1 tablet (2 mg) by mouth every 3 hours as needed for severe pain 10 tablet 0    isosorbide mononitrate (ISMO/MONOKET) 20 MG tablet Take 2 tablets (40 mg) by mouth 3 times daily 540 tablet 3    methocarbamol (ROBAXIN) 500 MG tablet Take 1 tablet (500 mg) by mouth every 6 hours as needed for muscle spasms 30 tablet 0    metoprolol tartrate (LOPRESSOR) 50 MG tablet Take 1 tablet (50 mg) by mouth 2 times daily 60 tablet 1    Multiple Vitamins-Minerals (PRESERVISION AREDS 2+MULTI VIT PO) Take by mouth 2 times daily      omeprazole (PRILOSEC) 40 MG DR capsule Take 1 capsule (40 mg) by mouth daily 90 capsule 3    potassium chloride ER (K-TAB/KLOR-CON) 10 MEQ CR tablet Take 1 tablet (10 mEq) by mouth daily 90 tablet 3    psyllium (METAMUCIL/KONSYL) Packet Take 1 packet by mouth daily 30 each 0    rosuvastatin (CRESTOR) 20 MG tablet Take 1 tablet (20 mg) by mouth daily for 360 days 90 tablet 3    timolol maleate (TIMOPTIC) 0.5 % ophthalmic solution INSTILL 1 DROP INTO EACH EYE ONCE DAILY IN THE MORNING      vitamin D3 25 mcg (1000 units) tablet Take 1 tablet (25 mcg) by mouth every other day 90 tablet 3             Review of Systems:     A complete review of systems was performed and is negative except as noted  in the HPI           Physical Exam:   Temp: 98.2  F (36.8  C) Temp src: Oral BP: (!) 167/69 Pulse: 75   Resp: 18 SpO2: 100 % O2 Device: None (Room air)    Wt:   Wt Readings from Last 2 Encounters:   04/19/24 71.9 kg (158 lb 8.2 oz)   04/11/24 71 kg (156 lb 8 oz)        General: 89 year old female lying in bed in NAD. Appears comfortable.  Answers appropriately.    HEENT: Head is AT/NC. Sclera anicteric.   Lungs: No increased work of breathing, speaking in full sentences, equal chest rise. On Room Air.   Heart: Irregular rhythm per telemetry monitor consistent with Atrial fibrillation, no tachycardia. Peripheral perfusion intact.  Abdomen: Soft, non-distended, +mildly but appropriately tender without rebound or guarding at the vertical upper abdominal incision with incisional staples still in place and surrounding ecchymosis; incision clean/dry/intact without erythema or purulence  No peritoneal signs.  Extremities: WWP.  Musculoskeletal: No gross deformity.  Skin: No jaundice or rash on exposed skin.  Neurologic: Grossly non-focal.  CN 2-12 grossly intact.   Mental status/Psych: A&O. Asks/answers questions appropriately. Pleasant, interactive.             Data:   LAB WORK:    BMP  Recent Labs   Lab 05/01/24  1101 04/26/24  0446    144   POTASSIUM 3.7 3.4   CHLORIDE 105 107   ALEXANDRO 8.8 8.4*   CO2 20* 24   BUN 43.3* 17.2   CR 1.77* 1.86*   GLC 93 78     CBC  Recent Labs   Lab 05/01/24  1101 04/26/24  0446   WBC 5.5 8.3   RBC 2.98* 2.95*   HGB 8.1* 8.1*   HCT 26.1* 26.4*   MCV 88 90   MCH 27.2 27.5   MCHC 31.0* 30.7*   RDW 19.1* 19.8*    213     INR  Recent Labs   Lab 05/01/24  1101   INR 1.41*     LFTs  Recent Labs   Lab 05/01/24  1101   ALKPHOS 72   AST 42   ALT 23   BILITOTAL 0.5   PROTTOTAL 6.7   ALBUMIN 3.6      PANCNo lab results found in last 7 days.      IMAGING:    CT ABDOMEN PELVIS W/O CONTRAST, 5/1/2024 11:46 AM   INDICATION: abd pain, recent surgery for SBO, now having melena in  addition to  pain  FINDINGS:     Lower thorax: Linear fibroatelectasis in the lung bases.     Liver: No mass. No intrahepatic biliary ductal dilation.     Biliary System: Status post cholecystectomy. No extrahepatic biliary  ductal dilation.     Pancreas: No mass or pancreatic ductal dilation.     Adrenal glands: No mass or nodules     Spleen: Normal.     Kidneys: Atrophic appearance of the kidneys. No obstructing calculus  or hydronephrosis.     Gastrointestinal tract: No abnormally dilated loops of bowel.  Extensive colonic diverticulosis without evidence of acute  diverticulitis. Endoscopy clip in the ascending colon at the level of  the ileocecal valve.      Mesentery/peritoneum/retroperitoneum: No free air. Trace free fluid in  the pelvis. Small amount of intermediately attenuating fluid adjacent  to loops of small bowel in the right central mesentery (series 5 image  92), likely postsurgical. Soft tissue thickening in the transverse  mesocolon. In the anterior abdomen, deep to the ventral abdominal wall  incision and mid to upper rectus abdominis musculature, there is fat  stranding with focal encapsulated fatty focus with a peripheral  hyperdense halo (series 5 image 64).     Lymph nodes: No significant lymphadenopathy.     Vasculature: Nonaneurysmal abdominal aorta with moderate aortobiiliac  atherosclerotic calcifications. Infrarenal IVC filter.     Pelvis: Urinary bladder is unremarkable.  The pelvis is partially  obscured by metallic streak artifact.     Osseous structures: No aggressive or acute osseous lesion.  Bilateral  hip arthroplasties.      Soft tissues: Postsurgical changes with ventral abdominal wall  laparotomy defect.  Small fat-containing periumbilical hernia.                                                                      IMPRESSION:   1. Postsurgical changes of the abdomen with ventral abdominal wall  incision. Immediately deep to the incision and rectus abdominis  musculature, there is stranding  associated with lobules of fat, likely  evolving postsurgical edema and/or fat necrosis. Soft tissue  thickening in the transverse mesocolon and small amount of blood  products in the right central mesentery, also likely evolving  postsurgical change. No convincing evidence of acute hemorrhage or  drainable fluid collection on this noncontrast CT.  2. No abnormally dilated loops of bowel. No evidence of recurrent  bowel obstruction.  3. Trace free fluid in the pelvis. No free air.          =======================================================================

## 2024-05-02 ENCOUNTER — ANESTHESIA (OUTPATIENT)
Dept: GASTROENTEROLOGY | Facility: CLINIC | Age: 89
DRG: 378 | End: 2024-05-02
Payer: COMMERCIAL

## 2024-05-02 ENCOUNTER — ANESTHESIA EVENT (OUTPATIENT)
Dept: GASTROENTEROLOGY | Facility: CLINIC | Age: 89
DRG: 378 | End: 2024-05-02
Payer: COMMERCIAL

## 2024-05-02 ENCOUNTER — LAB REQUISITION (OUTPATIENT)
Dept: LAB | Facility: CLINIC | Age: 89
End: 2024-05-02
Payer: COMMERCIAL

## 2024-05-02 ENCOUNTER — TELEPHONE (OUTPATIENT)
Dept: FAMILY MEDICINE | Facility: CLINIC | Age: 89
End: 2024-05-02

## 2024-05-02 DIAGNOSIS — I25.10 CAD (CORONARY ARTERY DISEASE): Primary | ICD-10-CM

## 2024-05-02 DIAGNOSIS — D64.9 ANEMIA, UNSPECIFIED: ICD-10-CM

## 2024-05-02 LAB
ANION GAP SERPL CALCULATED.3IONS-SCNC: 14 MMOL/L (ref 7–15)
APTT PPP: 40 SECONDS (ref 22–38)
BASOPHILS # BLD AUTO: ABNORMAL 10*3/UL
BASOPHILS # BLD MANUAL: 0.2 10E3/UL (ref 0–0.2)
BASOPHILS NFR BLD AUTO: ABNORMAL %
BASOPHILS NFR BLD MANUAL: 3 %
BUN SERPL-MCNC: 33 MG/DL (ref 8–23)
BURR CELLS BLD QL SMEAR: SLIGHT
CALCIUM SERPL-MCNC: 8.4 MG/DL (ref 8.8–10.2)
CHLORIDE SERPL-SCNC: 112 MMOL/L (ref 98–107)
CREAT SERPL-MCNC: 1.59 MG/DL (ref 0.51–0.95)
DEPRECATED HCO3 PLAS-SCNC: 19 MMOL/L (ref 22–29)
EGFRCR SERPLBLD CKD-EPI 2021: 31 ML/MIN/1.73M2
ELLIPTOCYTES BLD QL SMEAR: SLIGHT
EOSINOPHIL # BLD AUTO: ABNORMAL 10*3/UL
EOSINOPHIL # BLD MANUAL: 0 10E3/UL (ref 0–0.7)
EOSINOPHIL NFR BLD AUTO: ABNORMAL %
EOSINOPHIL NFR BLD MANUAL: 0 %
ERYTHROCYTE [DISTWIDTH] IN BLOOD BY AUTOMATED COUNT: 19.3 % (ref 10–15)
GLUCOSE SERPL-MCNC: 91 MG/DL (ref 70–99)
HCT VFR BLD AUTO: 25.4 % (ref 35–47)
HGB BLD-MCNC: 7.7 G/DL (ref 11.7–15.7)
HOLD SPECIMEN: NORMAL
IMM GRANULOCYTES # BLD: ABNORMAL 10*3/UL
IMM GRANULOCYTES NFR BLD: ABNORMAL %
INR PPP: 1.3 (ref 0.85–1.15)
LYMPHOCYTES # BLD AUTO: ABNORMAL 10*3/UL
LYMPHOCYTES # BLD MANUAL: 0.6 10E3/UL (ref 0.8–5.3)
LYMPHOCYTES NFR BLD AUTO: ABNORMAL %
LYMPHOCYTES NFR BLD MANUAL: 11 %
MAGNESIUM SERPL-MCNC: 1.9 MG/DL (ref 1.7–2.3)
MCH RBC QN AUTO: 27 PG (ref 26.5–33)
MCHC RBC AUTO-ENTMCNC: 30.3 G/DL (ref 31.5–36.5)
MCV RBC AUTO: 89 FL (ref 78–100)
MONOCYTES # BLD AUTO: ABNORMAL 10*3/UL
MONOCYTES # BLD MANUAL: 1.3 10E3/UL (ref 0–1.3)
MONOCYTES NFR BLD AUTO: ABNORMAL %
MONOCYTES NFR BLD MANUAL: 22 %
NEUTROPHILS # BLD AUTO: ABNORMAL 10*3/UL
NEUTROPHILS # BLD MANUAL: 3.6 10E3/UL (ref 1.6–8.3)
NEUTROPHILS NFR BLD AUTO: ABNORMAL %
NEUTROPHILS NFR BLD MANUAL: 64 %
NRBC # BLD AUTO: 0 10E3/UL
NRBC BLD AUTO-RTO: 0 /100
PATH REPORT.COMMENTS IMP SPEC: NORMAL
PATH REPORT.COMMENTS IMP SPEC: NORMAL
PATH REPORT.FINAL DX SPEC: NORMAL
PATH REPORT.MICROSCOPIC SPEC OTHER STN: NORMAL
PATH REPORT.MICROSCOPIC SPEC OTHER STN: NORMAL
PATH REPORT.RELEVANT HX SPEC: NORMAL
PHOSPHATE SERPL-MCNC: 3.3 MG/DL (ref 2.5–4.5)
PLAT MORPH BLD: ABNORMAL
PLATELET # BLD AUTO: ABNORMAL 10*3/UL
POTASSIUM SERPL-SCNC: 3.3 MMOL/L (ref 3.4–5.3)
POTASSIUM SERPL-SCNC: 4 MMOL/L (ref 3.4–5.3)
RBC # BLD AUTO: 2.85 10E6/UL (ref 3.8–5.2)
RBC MORPH BLD: ABNORMAL
SODIUM SERPL-SCNC: 145 MMOL/L (ref 135–145)
VARIANT LYMPHS BLD QL SMEAR: PRESENT
WBC # BLD AUTO: 5.7 10E3/UL (ref 4–11)

## 2024-05-02 PROCEDURE — 83735 ASSAY OF MAGNESIUM: CPT | Performed by: INTERNAL MEDICINE

## 2024-05-02 PROCEDURE — 250N000011 HC RX IP 250 OP 636: Performed by: STUDENT IN AN ORGANIZED HEALTH CARE EDUCATION/TRAINING PROGRAM

## 2024-05-02 PROCEDURE — 88313 SPECIAL STAINS GROUP 2: CPT | Mod: TC | Performed by: INTERNAL MEDICINE

## 2024-05-02 PROCEDURE — 85730 THROMBOPLASTIN TIME PARTIAL: CPT | Performed by: INTERNAL MEDICINE

## 2024-05-02 PROCEDURE — 88305 TISSUE EXAM BY PATHOLOGIST: CPT | Mod: 26 | Performed by: PATHOLOGY

## 2024-05-02 PROCEDURE — 85041 AUTOMATED RBC COUNT: CPT | Performed by: INTERNAL MEDICINE

## 2024-05-02 PROCEDURE — 88313 SPECIAL STAINS GROUP 2: CPT | Mod: 26 | Performed by: PATHOLOGY

## 2024-05-02 PROCEDURE — 999N000157 HC STATISTIC RCP TIME EA 10 MIN

## 2024-05-02 PROCEDURE — 999N000128 HC STATISTIC PERIPHERAL IV START W/O US GUIDANCE

## 2024-05-02 PROCEDURE — 250N000009 HC RX 250: Performed by: INTERNAL MEDICINE

## 2024-05-02 PROCEDURE — 250N000013 HC RX MED GY IP 250 OP 250 PS 637: Performed by: INTERNAL MEDICINE

## 2024-05-02 PROCEDURE — 43239 EGD BIOPSY SINGLE/MULTIPLE: CPT | Performed by: ANESTHESIOLOGY

## 2024-05-02 PROCEDURE — 120N000005 HC R&B MS OVERFLOW UMMC

## 2024-05-02 PROCEDURE — 250N000011 HC RX IP 250 OP 636: Performed by: INTERNAL MEDICINE

## 2024-05-02 PROCEDURE — 80048 BASIC METABOLIC PNL TOTAL CA: CPT | Performed by: INTERNAL MEDICINE

## 2024-05-02 PROCEDURE — 258N000003 HC RX IP 258 OP 636: Performed by: STUDENT IN AN ORGANIZED HEALTH CARE EDUCATION/TRAINING PROGRAM

## 2024-05-02 PROCEDURE — 94640 AIRWAY INHALATION TREATMENT: CPT | Mod: 76

## 2024-05-02 PROCEDURE — C9113 INJ PANTOPRAZOLE SODIUM, VIA: HCPCS | Performed by: INTERNAL MEDICINE

## 2024-05-02 PROCEDURE — 85007 BL SMEAR W/DIFF WBC COUNT: CPT | Performed by: INTERNAL MEDICINE

## 2024-05-02 PROCEDURE — 85610 PROTHROMBIN TIME: CPT | Performed by: INTERNAL MEDICINE

## 2024-05-02 PROCEDURE — 84100 ASSAY OF PHOSPHORUS: CPT | Performed by: INTERNAL MEDICINE

## 2024-05-02 PROCEDURE — 370N000017 HC ANESTHESIA TECHNICAL FEE, PER MIN: Performed by: INTERNAL MEDICINE

## 2024-05-02 PROCEDURE — 94640 AIRWAY INHALATION TREATMENT: CPT

## 2024-05-02 PROCEDURE — 99100 ANES PT EXTEME AGE<1 YR&>70: CPT | Performed by: ANESTHESIOLOGY

## 2024-05-02 PROCEDURE — 250N000009 HC RX 250: Performed by: STUDENT IN AN ORGANIZED HEALTH CARE EDUCATION/TRAINING PROGRAM

## 2024-05-02 PROCEDURE — 84132 ASSAY OF SERUM POTASSIUM: CPT | Performed by: INTERNAL MEDICINE

## 2024-05-02 PROCEDURE — 36415 COLL VENOUS BLD VENIPUNCTURE: CPT | Performed by: INTERNAL MEDICINE

## 2024-05-02 PROCEDURE — 43235 EGD DIAGNOSTIC BRUSH WASH: CPT | Performed by: INTERNAL MEDICINE

## 2024-05-02 PROCEDURE — 0DB58ZX EXCISION OF ESOPHAGUS, VIA NATURAL OR ARTIFICIAL OPENING ENDOSCOPIC, DIAGNOSTIC: ICD-10-PCS | Performed by: INTERNAL MEDICINE

## 2024-05-02 PROCEDURE — 99232 SBSQ HOSP IP/OBS MODERATE 35: CPT | Mod: GC | Performed by: INTERNAL MEDICINE

## 2024-05-02 PROCEDURE — 99232 SBSQ HOSP IP/OBS MODERATE 35: CPT | Mod: 24

## 2024-05-02 RX ORDER — SODIUM CHLORIDE, SODIUM LACTATE, POTASSIUM CHLORIDE, CALCIUM CHLORIDE 600; 310; 30; 20 MG/100ML; MG/100ML; MG/100ML; MG/100ML
INJECTION, SOLUTION INTRAVENOUS CONTINUOUS PRN
Status: DISCONTINUED | OUTPATIENT
Start: 2024-05-02 | End: 2024-05-02

## 2024-05-02 RX ORDER — LIDOCAINE HYDROCHLORIDE 20 MG/ML
INJECTION, SOLUTION INFILTRATION; PERINEURAL PRN
Status: DISCONTINUED | OUTPATIENT
Start: 2024-05-02 | End: 2024-05-02

## 2024-05-02 RX ORDER — POTASSIUM CHLORIDE 750 MG/1
20 TABLET, EXTENDED RELEASE ORAL ONCE
Status: COMPLETED | OUTPATIENT
Start: 2024-05-02 | End: 2024-05-02

## 2024-05-02 RX ORDER — ONDANSETRON 2 MG/ML
INJECTION INTRAMUSCULAR; INTRAVENOUS PRN
Status: DISCONTINUED | OUTPATIENT
Start: 2024-05-02 | End: 2024-05-02

## 2024-05-02 RX ORDER — DEXAMETHASONE SODIUM PHOSPHATE 10 MG/ML
4 INJECTION, SOLUTION INTRAMUSCULAR; INTRAVENOUS
Status: CANCELLED | OUTPATIENT
Start: 2024-05-02

## 2024-05-02 RX ORDER — DEXAMETHASONE SODIUM PHOSPHATE 4 MG/ML
INJECTION, SOLUTION INTRA-ARTICULAR; INTRALESIONAL; INTRAMUSCULAR; INTRAVENOUS; SOFT TISSUE PRN
Status: DISCONTINUED | OUTPATIENT
Start: 2024-05-02 | End: 2024-05-02

## 2024-05-02 RX ORDER — ONDANSETRON 4 MG/1
4 TABLET, ORALLY DISINTEGRATING ORAL EVERY 30 MIN PRN
Status: CANCELLED | OUTPATIENT
Start: 2024-05-02

## 2024-05-02 RX ORDER — NALOXONE HYDROCHLORIDE 0.4 MG/ML
0.1 INJECTION, SOLUTION INTRAMUSCULAR; INTRAVENOUS; SUBCUTANEOUS
Status: CANCELLED | OUTPATIENT
Start: 2024-05-02

## 2024-05-02 RX ORDER — POTASSIUM CHLORIDE 7.45 MG/ML
10 INJECTION INTRAVENOUS
Status: DISCONTINUED | OUTPATIENT
Start: 2024-05-02 | End: 2024-05-02 | Stop reason: SINTOL

## 2024-05-02 RX ORDER — POTASSIUM CHLORIDE 1.5 G/1.58G
20 POWDER, FOR SOLUTION ORAL ONCE
Status: COMPLETED | OUTPATIENT
Start: 2024-05-02 | End: 2024-05-02

## 2024-05-02 RX ORDER — PROPOFOL 10 MG/ML
INJECTION, EMULSION INTRAVENOUS PRN
Status: DISCONTINUED | OUTPATIENT
Start: 2024-05-02 | End: 2024-05-02

## 2024-05-02 RX ORDER — ONDANSETRON 2 MG/ML
4 INJECTION INTRAMUSCULAR; INTRAVENOUS EVERY 30 MIN PRN
Status: CANCELLED | OUTPATIENT
Start: 2024-05-02

## 2024-05-02 RX ORDER — PROPOFOL 10 MG/ML
INJECTION, EMULSION INTRAVENOUS CONTINUOUS PRN
Status: DISCONTINUED | OUTPATIENT
Start: 2024-05-02 | End: 2024-05-02

## 2024-05-02 RX ADMIN — PANTOPRAZOLE SODIUM 40 MG: 40 INJECTION, POWDER, FOR SOLUTION INTRAVENOUS at 08:13

## 2024-05-02 RX ADMIN — ONDANSETRON 4 MG: 2 INJECTION INTRAMUSCULAR; INTRAVENOUS at 12:20

## 2024-05-02 RX ADMIN — OMEPRAZOLE 40 MG: 20 CAPSULE, DELAYED RELEASE ORAL at 18:07

## 2024-05-02 RX ADMIN — DEXAMETHASONE SODIUM PHOSPHATE 4 MG: 4 INJECTION, SOLUTION INTRA-ARTICULAR; INTRALESIONAL; INTRAMUSCULAR; INTRAVENOUS; SOFT TISSUE at 12:14

## 2024-05-02 RX ADMIN — ALLOPURINOL 100 MG: 100 TABLET ORAL at 13:37

## 2024-05-02 RX ADMIN — SODIUM CHLORIDE, POTASSIUM CHLORIDE, SODIUM LACTATE AND CALCIUM CHLORIDE: 600; 310; 30; 20 INJECTION, SOLUTION INTRAVENOUS at 12:09

## 2024-05-02 RX ADMIN — PROPOFOL 20 MG: 10 INJECTION, EMULSION INTRAVENOUS at 12:18

## 2024-05-02 RX ADMIN — LIDOCAINE HYDROCHLORIDE 60 MG: 20 INJECTION, SOLUTION INFILTRATION; PERINEURAL at 12:14

## 2024-05-02 RX ADMIN — FLUTICASONE FUROATE AND VILANTEROL TRIFENATATE 1 PUFF: 100; 25 POWDER RESPIRATORY (INHALATION) at 08:13

## 2024-05-02 RX ADMIN — PROPOFOL 30 MG: 10 INJECTION, EMULSION INTRAVENOUS at 12:14

## 2024-05-02 RX ADMIN — ROSUVASTATIN CALCIUM 10 MG: 10 TABLET, FILM COATED ORAL at 18:07

## 2024-05-02 RX ADMIN — Medication 3 MG: at 20:06

## 2024-05-02 RX ADMIN — PROPOFOL 20 MG: 10 INJECTION, EMULSION INTRAVENOUS at 12:16

## 2024-05-02 RX ADMIN — BUDESONIDE 0.5 MG: 0.5 INHALANT RESPIRATORY (INHALATION) at 20:52

## 2024-05-02 RX ADMIN — BUDESONIDE 0.5 MG: 0.5 INHALANT RESPIRATORY (INHALATION) at 07:59

## 2024-05-02 RX ADMIN — POTASSIUM CHLORIDE 20 MEQ: 1.5 POWDER, FOR SOLUTION ORAL at 17:44

## 2024-05-02 RX ADMIN — PROPOFOL 100 MCG/KG/MIN: 10 INJECTION, EMULSION INTRAVENOUS at 12:14

## 2024-05-02 ASSESSMENT — ACTIVITIES OF DAILY LIVING (ADL)
ADLS_ACUITY_SCORE: 31
ADLS_ACUITY_SCORE: 31
ADLS_ACUITY_SCORE: 33
ADLS_ACUITY_SCORE: 31
ADLS_ACUITY_SCORE: 33
ADLS_ACUITY_SCORE: 31
ADLS_ACUITY_SCORE: 33
ADLS_ACUITY_SCORE: 31
ADLS_ACUITY_SCORE: 25
ADLS_ACUITY_SCORE: 33
ADLS_ACUITY_SCORE: 25

## 2024-05-02 ASSESSMENT — ENCOUNTER SYMPTOMS: DYSRHYTHMIAS: 1

## 2024-05-02 NOTE — ANESTHESIA CARE TRANSFER NOTE
Patient: Tessa Mansfield    Procedure: Procedure(s):  Esophagoscopy, gastroscopy, duodenoscopy (EGD), combined       Diagnosis: Melena [K92.1]  Diagnosis Additional Information: No value filed.    Anesthesia Type:   MAC     Note:    Oropharynx: oropharynx clear of all foreign objects and spontaneously breathing  Level of Consciousness: awake  Oxygen Supplementation: nasal cannula  Level of Supplemental Oxygen (L/min / FiO2): 2  Independent Airway: airway patency satisfactory and stable  Dentition: dentition unchanged  Vital Signs Stable: post-procedure vital signs reviewed and stable  Report to RN Given: handoff report given  Patient transferred to: PACU    Handoff Report: Identifed the Patient, Identified the Reponsible Provider, Reviewed the pertinent medical history, Discussed the surgical course, Reviewed Intra-OP anesthesia mangement and issues during anesthesia, Set expectations for post-procedure period and Allowed opportunity for questions and acknowledgement of understanding      Vitals:  Vitals Value Taken Time   /63 05/02/24 1240   Temp 36.4  C (97.6  F) 05/02/24 1240   Pulse 79 05/02/24 1240   Resp 18 05/02/24 1240   SpO2 100 % 05/02/24 1240       Electronically Signed By: Donell Bravo MD  May 2, 2024  12:43 PM  
[facility] Division of Hospital Medicine  Vidhi Monaco MD  Pager (M-F, 8A-5P): [phone]  Other Times:  [phone]    Patient is a 89y old  Female who presents with a chief complaint of Syncope (19 Aug 2020 10:45)      SUBJECTIVE / OVERNIGHT EVENTS:  afebrile. still confused but responsive.     ADDITIONAL REVIEW OF SYSTEMS:    MEDICATIONS  (STANDING):  atorvastatin 20 milliGRAM(s) Oral at bedtime  cefTRIAXone   IVPB      cefTRIAXone   IVPB 1000 milliGRAM(s) IV Intermittent every 24 hours  donepezil 10 milliGRAM(s) Oral at bedtime  enoxaparin Injectable 30 milliGRAM(s) SubCutaneous daily  melatonin 1 milliGRAM(s) Oral at bedtime  memantine 10 milliGRAM(s) Oral two times a day  metoprolol tartrate 25 milliGRAM(s) Oral two times a day  mirtazapine 15 milliGRAM(s) Oral at bedtime  polyethylene glycol 3350 17 Gram(s) Oral daily  QUEtiapine 12.5 milliGRAM(s) Oral at bedtime    MEDICATIONS  (PRN):      CAPILLARY BLOOD GLUCOSE        I&O's Summary    18 Aug 2020 07:01  -  19 Aug 2020 07:00  --------------------------------------------------------  IN: 150 mL / OUT: 200 mL / NET: -50 mL    19 Aug 2020 07:01  -  19 Aug 2020 15:27  --------------------------------------------------------  IN: 110 mL / OUT: 0 mL / NET: 110 mL        PHYSICAL EXAM:  Vital Signs Last 24 Hrs  T(C): 36.4 (19 Aug 2020 11:12), Max: 36.7 (18 Aug 2020 16:00)  T(F): 97.5 (19 Aug 2020 11:12), Max: 98.1 (18 Aug 2020 16:00)  HR: 49 (19 Aug 2020 11:12) (49 - 64)  BP: 166/70 (19 Aug 2020 11:12) (137/64 - 171/59)  BP(mean): --  RR: 18 (19 Aug 2020 11:12) (18 - 18)  SpO2: 96% (19 Aug 2020 11:12) (96% - 99%)    CONSTITUTIONAL: NAD, well-groomed  EYES:  conjunctiva and sclera clear  ENMT: Moist oral mucosa  NECK: Supple, no palpable masses; no JVD  RESPIRATORY: Normal respiratory effort; lungs are clear to auscultation bilaterally  CARDIOVASCULAR: Regular rate and rhythm, normal S1 and S2, no murmur/rub/gallop; No lower extremity edema  ABDOMEN: Nontender to palpation, normoactive bowel sounds, no rebound/guarding  MUSCULOSKELETAL:  no clubbing or cyanosis of digits; no joint swelling or tenderness to palpation  PSYCH: A+O to person; affect appropriate  SKIN: No rashes; no palpable lesions    LABS:                        12.7   8.40  )-----------( 162      ( 19 Aug 2020 05:08 )             39.1     08-19    144  |  111<H>  |  19  ----------------------------<  102<H>  4.1   |  23  |  1.37<H>    Ca    9.9      19 Aug 2020 05:08    TPro  6.1  /  Alb  4.0  /  TBili  0.2  /  DBili  x   /  AST  17  /  ALT  7<L>  /  AlkPhos  73  08-17          Urinalysis Basic - ( 18 Aug 2020 05:18 )    Color: Light Yellow / Appearance: Clear / S.009 / pH: x  Gluc: x / Ketone: Negative  / Bili: Negative / Urobili: Negative   Blood: x / Protein: Negative / Nitrite: Negative   Leuk Esterase: Large / RBC: 2 /hpf / WBC 54 /HPF   Sq Epi: x / Non Sq Epi: 3 /hpf / Bacteria: Negative          RADIOLOGY & ADDITIONAL TESTS:  Results Reviewed:   Imaging Personally Reviewed:  Electrocardiogram Personally Reviewed:    COORDINATION OF CARE:  Care Discussed with Consultants/Other Providers [Y/N]:  Prior or Outpatient Records Reviewed [Y/N]:
[facility] Division of Hospital Medicine  Vidhi Monaco MD  Pager (M-F, 8K-5P): [phone]  Other Times:  [phone]    Patient is a 89y old  Female who presents with a chief complaint of Syncope (18 Aug 2020 00:35)      SUBJECTIVE / OVERNIGHT EVENTS:  Alert and awake. confused. AAOx1. denies any specific complaints.   afebrile. denies any urinary symptoms.     ADDITIONAL REVIEW OF SYSTEMS:    MEDICATIONS  (STANDING):  atorvastatin 20 milliGRAM(s) Oral at bedtime  cefTRIAXone   IVPB      cefTRIAXone   IVPB 1000 milliGRAM(s) IV Intermittent once  donepezil 10 milliGRAM(s) Oral at bedtime  enoxaparin Injectable 30 milliGRAM(s) SubCutaneous daily  melatonin 1 milliGRAM(s) Oral at bedtime  memantine 10 milliGRAM(s) Oral two times a day  metoprolol tartrate 25 milliGRAM(s) Oral two times a day  mirtazapine 15 milliGRAM(s) Oral at bedtime  polyethylene glycol 3350 17 Gram(s) Oral daily  QUEtiapine 12.5 milliGRAM(s) Oral at bedtime    MEDICATIONS  (PRN):      CAPILLARY BLOOD GLUCOSE        I&O's Summary    17 Aug 2020 07:  -  18 Aug 2020 07:00  --------------------------------------------------------  IN: 275 mL / OUT: 250 mL / NET: 25 mL    18 Aug 2020 07:  -  18 Aug 2020 15:44  --------------------------------------------------------  IN: 150 mL / OUT: 200 mL / NET: -50 mL        PHYSICAL EXAM:  Vital Signs Last 24 Hrs  T(C): 36.4 (18 Aug 2020 10:00), Max: 37 (17 Aug 2020 23:36)  T(F): 97.5 (18 Aug 2020 10:00), Max: 98.6 (17 Aug 2020 23:36)  HR: 54 (18 Aug 2020 10:00) (49 - 56)  BP: 154/76 (18 Aug 2020 10:00) (97/55 - 160/67)  BP(mean): --  RR: 18 (18 Aug 2020 10:00) (16 - 19)  SpO2: 98% (18 Aug 2020 10:00) (97% - 100%)    CONSTITUTIONAL: NAD, well-groomed  EYES:  conjunctiva and sclera clear  ENMT: Moist oral mucosa  NECK: Supple, no palpable masses; no JVD  RESPIRATORY: Normal respiratory effort; lungs are clear to auscultation bilaterally  CARDIOVASCULAR: Regular rate and rhythm, normal S1 and S2, no murmur/rub/gallop; No lower extremity edema  ABDOMEN: Nontender to palpation, normoactive bowel sounds, no rebound/guarding  MUSCULOSKELETAL:  no clubbing or cyanosis of digits; no joint swelling or tenderness to palpation  PSYCH: A+O to person; affect appropriate  SKIN: No rashes; no palpable lesions    LABS:                        14.1   12.01 )-----------( 165      ( 18 Aug 2020 06:52 )             43.9     08-18    144  |  108  |  20  ----------------------------<  112<H>  4.7   |  24  |  1.31<H>    Ca    10.3      18 Aug 2020 06:52    TPro  6.1  /  Alb  4.0  /  TBili  0.2  /  DBili  x   /  AST  17  /  ALT  7<L>  /  AlkPhos  73  08-17          Urinalysis Basic - ( 18 Aug 2020 05:18 )    Color: Light Yellow / Appearance: Clear / S.009 / pH: x  Gluc: x / Ketone: Negative  / Bili: Negative / Urobili: Negative   Blood: x / Protein: Negative / Nitrite: Negative   Leuk Esterase: Large / RBC: 2 /hpf / WBC 54 /HPF   Sq Epi: x / Non Sq Epi: 3 /hpf / Bacteria: Negative          RADIOLOGY & ADDITIONAL TESTS:  Results Reviewed:   Imaging Personally Reviewed:  Electrocardiogram Personally Reviewed:    COORDINATION OF CARE:  Care Discussed with Consultants/Other Providers [Y/N]:  Prior or Outpatient Records Reviewed [Y/N]:

## 2024-05-02 NOTE — OR NURSING
Pt underwent EGD with biopsies under MAC. Pt transferred to PACU before heading back to floor. Report called to bedside RN.     Snaa Nava RN

## 2024-05-02 NOTE — PROGRESS NOTES
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      Patient had a recent admission 4/13/2024-4/22/2024 at [facility] for small bowel obstruction status-post exploratory laparotomy 4/13/2024 with hemorrhagic bowel without necrosis, multiple areas small bowel torsion around several small adhesions status-post adhesiolysis and transverse colon epiploicae complicated by bleeding with return to the operating room 4/13/2024 for exploratory laparotomy found to have slow ooze from suture line along inferior aspect of the transverse meso colon (site of prior lysis of adhesion) that was controlled with additional suture, quick clot gauze and an Abthera device.  Patient was taken back to the OR on 4/14/2024 for abdominal re-exploration and closure that reports healthy bowel, small amount of  bleeding from an area on the proximal jejunum where adhesions had been taken down, minimal clot in the abdomen, removal of quick-clot gauze, and abdominal midline wound closure. Patient was discharge to TCU on 4/22/2024.         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 At that time, it was recommended to discuss with cardiology on resumption of antiplatelets and Eliquis, as from a GI bleed perspective, it would have been beneficial to hold Eliquis temporarily if not permanently.      Presented 5/1/2024 with 2 episodes of melena. After previous procedures in July 2023, patient denies episodes of overt GI bleeding prior this these episodes of melena overnight and today.  - Hemodynamically stable.   - Hemoglobin stable at 8.1 g/dL (5/1) <-- 8.1 g/dL (4/26).   - Azotemia with elevated BUN:Creatine ratio, concerning for upper GI source of bleeding as source of melena.   - History of iron deficiency; however, patient denies PTA oral iron.   - Last dose of Plavix and Eliquis was evening of 4/30/2024.     Status-post EGD 5/2/2024 with no obvious abnormality to explain the patient's black stools. Highest on the differential remains oozing AVM in the setting of DOAC and antiplatelet therapy with history of  cardiac stents, chronic cardiac disease, chronic kidney disease, most likely AVM(s) in the small bowel versus potentially AVMs in the right colon. If ongoing evidence of overt GI bleeding with worsening anemia concerning for ongoing blood loss, will need to consider repeat colonoscopy if patient is amenable and if it aligns with the patient's overall goals of care.     Given recent abdominal surgery with adhesions, small bowel video capsule endoscopy (PillCam) at higher risk for getting stuck in the GI tract (with the potential to require surgery for removal). Patient would require patency capsule (dissolvable capsule) to ensure passage prior to consideration of PillCam, though still would be at risk for getting stuck in the GI tract. The risk outweigh the benefits at this time. In light of advanced kidney and cardiac disease plus history of colonic AVM, there is a pre-test possibility for small bowel AVM (angiodysplasia) disease contributing to the chronic iron deficiency anemia picture but ablative (deep small bowel enteroscopy + Argon Plasma Coagulation [APC]) or disease-modifying therapies (subcutaneous octreotide - generally not pursued unless patient fails iron supplementation) would not be appropriate unless active VISIBLE bleeding were demonstrated clinically. Given the multiple active medical comorbidities, aggressive/invasive interventions may not provide adequate benefit for all the risks incurred. Recommend iron repletion with IV iron infusions over oral iron as oral iron will also turn the patient's stools black, making it difficult to differentiate medication effect versus concern for GI bleeding.     Given acute on chronic normocytic anemia with chronic cardiac disease and chronic kidney disease, patient would benefit from referral to cardiac/nephrology anemia clinic for acute on chronic normocytic anemia with ongoing hemoglobin and iron studies monitoring with consideration of orders for outpatient  packed red blood cell transfusions as needed and IV iron infusions as needed.       RECOMMENDATIONS:  -- Restart PTA Omeprazole 40 mg PO daily.   -- Diet per primary team.   -- Strict documentation of rectal output.  - Appreciate detailed documentation of stool appearance, including color, consistency, frequency and amount.    - Consider smearing stool thinly on white paper towel to distinguish color.     -- Regarding anticoagulation/antiplatelet therapy, from a GI perspective, no contraindication to restarting anticoagulation/antiplatelets. Defer decision and risk/benefit discussion to primary team and cardiology team.     -- Maintain 2 large bore IVs, 18G or larger.  -- Continue Supportive Cares  - Adequate volume resuscitation with IVF, pRBCs.  - Monitor Hemoglobin closely. Transfuse to keep Hgb > 7 g/dL from GI standpoint.   - Monitor Platelets closely. Keep PLT > 50 10e3/uL from GI standpoint.  - Maintain hemodynamics: MAP >65 mmHg and HR <100.  - Monitor and optimize electrolytes.  - Monitor and optimize nutrition.   - Reposition every 30 minutes while awake.  - Encourage Ambulation as able: 4-6 walks per day.   -- Avoid NSAIDs.  -- Analgesics/Antiemetics per primary team.  -- If the patient experiences overt GI bleeding with hemodynamic instability, please page the GI Luminal Service (listed on [facility]).       Outpatient:   -- No outpatient GI Follow-up necessary.   -- Patient would benefit from referral to the Cardiac Anemia clinic for acute on chronic normocytic anemia with ongoing hemoglobin and iron studies monitoring with consideration of orders for outpatient packed red blood cell transfusions as needed and IV iron infusions as needed.       COVID status: not tested this admission.      Discussed with Dr. Sabrina Torres - Medicine [facility] 4 Resident via Admittor.     Thank you for involving us in this patient's care. Please do not hesitate to contact the GI service with any questions or concerns.     The  "patient was discussed and plan agreed upon with Dr. Jose Donaldson, GI Luminal Service staff physician.    Overall time spent on the date of this encounter preparing to see the patient (including chart review of available notes, clinical status events, imaging and labs); discussing, ordering, coordinating recommended medications, tests or procedures; communicating with other health care professionals; and documenting the above clinical information in the electronic medical record was 35 minutes.    Brianna Michael PA-C  Inpatient Gastroenterology Service  [facility]  Text Page  _______________________________________________________________      Subjective: Nursing notes and 24hr events reviewed.     Patient seen and examined at 1710.     Patient reports doing well. Denies nausea, vomiting, acid reflux, heartburn, abdominal pain. Reports most recent bowel movement still appeared black.   Discussed EGD results and assessment and plan as above. Patient expressed understanding and had no additional questions/concerns.       ROS:   4 pt ROS negative unless noted in subjective.     Objective:  Blood pressure (!) 140/59, pulse 79, temperature 98.4  F (36.9  C), temperature source Oral, resp. rate 18, height 1.676 m (5' 6\"), weight 65 kg (143 lb 3.2 oz), SpO2 99%, not currently breastfeeding.    General: 89 year female lying in bed with the head of bed elevated in NAD. Appears comfortable.  Answers appropriately.    HEENT: Head is AT/NC. Sclera anicteric. +eye glasses.  Lungs: No increased work of breathing, speaking in full sentences, equal chest rise. On Room Air.   Heart: +irregular consistent with known atrial fibrillation. Peripheral perfusion intact.  Abdomen: Soft, non-distended, +mildly but appropriately tender without rebound or guarding at the vertical upper abdominal incision with incisional staples still in place.  No peritoneal signs.   Extremities: WWP.  Musculoskeletal: No gross " deformity.  Skin: No jaundice or rash on exposed skin.  Neurologic: Grossly non-focal.  CN 2-12 grossly intact.   Mental status/Psych: A&O. Asks/answers questions appropriately. Pleasant, interactive.    Date 05/02/24 0700 - 05/03/24 0659   Shift 2425-1905 1700-4076 1533-1180 24 Hour Total   INTAKE   P.O.  400  400   I.V. 200   200   Shift Total(mL/kg) 200(3.08) 400(6.16)  600(9.24)   OUTPUT   Urine 250   250   Other  150  150   Shift Total(mL/kg) 250(3.85) 150(2.31)  400(6.16)   Weight (kg) 64.95 64.95 64.95 64.95         Previous Procedures:     5/2/2024 - EGD - Dr. Jose Donaldson  Provation procedure report pending.     7/21/2023 - Colonoscopy - Dr. Bekah Dash  Indications:           Hematochezia, Acute post hemorrhagic anemia   Impression:            - One bleeding colonic angiodysplastic lesion in the                          ascending colon. Clips (MR conditional) were placed.                          - Many polyps in the ascending and transverse colon,                          not removed given GI bleed and her age.                          - Diverticulosis in the entire examined colon.                          - No specimens collected.   Recommendation:        - Resume previous diet today.                          - Can discuss risk/benefit of repeat colonoscopy with                          polypectomy in the future as an outpatient                          - Please discuss with cardiology on resumption of                          antiplatelets and Eliquis. From GI bleed perspective,                          it will be beneficial to hold Eliquis at least                          temporarily if not permanently.                          - Can discontinue PPI.      7/20/2023 - EGD - Dr. Bekah Dash  Indications:           Acute post hemorrhagic anemia, Melena   Impression:            - No active bleeding or blood products visualized.                          - Normal esophagus.                           - Erythematous mucosa in the gastric body.                          - Normal duodenal bulb, second portion of the duodenum                          and third portion of the duodenum.                          - No specimens collected.   Recommendation:        - Clear liquid diet today.                          - Will discuss with patient whether colonoscopy will                          be within her goals of care.         4/5/2006 - EGD - Dr. Madhuri Haji  Indications:    Follow-up of acute gastric ulcer with hemorrhage   Impression:     - Normal esophagus.                   - Normal stomach.                   - Normal examined duodenum.   Recommendation: - Use Protonix (pantoprazole) at 40 mg by mouth daily                   indefinitely.         1/31/2006 - EGD - Dr. Star Nye  Indications:         Melena   Impression:          - Normal esophagus.                        - Gastric ulcers marginally oozing blood. Biopsied.                        - Normal duodenal bulb and 2nd part of the duodenum.   Recommendation:      - Return to referring provider today. Avoid Plavix.                        Ideally, would be off ASA. Maximal PPI.   FINAL DIAGNOSIS:  Stomach, body (biopsy of ulcer):  - Antral and transitional mucosa with active chronic gastritis.  - Erosion of the surface epithelium and focal adherent  fibrinoinflammatory debris, suggestive of nearby ulceration.  - No Helicobacter pylori is identified (immunostains for H. pylori are  negative).         1/31/2006 - Colonoscopy - Dr. Star Nye  Indications:         Melena of unknown origin   Impression:          - External and internal, non bleeding, mild hemorrhoids                        were found.                        - Diverticulosis.                        - The terminal ileum is normal.   Recommendation:      - Perform upper GI endoscopy today.         1/30/2006 - EGD - Dr. Bernard Vieira  Indications:         Melena   Impression:          - Normal  esophagus.                        - Red blood in the gastric fundus.                        - Normal examined duodenum.       LABS:  BMP  Recent Labs   Lab 05/02/24  0449 05/01/24  1101 04/26/24  0446    142 144   POTASSIUM 3.3* 3.7 3.4   CHLORIDE 112* 105 107   ALEXANDRO 8.4* 8.8 8.4*   CO2 19* 20* 24   BUN 33.0* 43.3* 17.2   CR 1.59* 1.77* 1.86*   GLC 91 93 78     CBC  Recent Labs   Lab 05/02/24  0614 05/01/24  1828 05/01/24  1101 04/26/24  0446   WBC 5.7 5.4 5.5 8.3   RBC 2.85* 2.99* 2.98* 2.95*   HGB 7.7* 8.2* 8.1* 8.1*   HCT 25.4* 26.2* 26.1* 26.4*   MCV 89 88 88 90   MCH 27.0 27.4 27.2 27.5   MCHC 30.3* 31.3* 31.0* 30.7*   RDW 19.3* 19.4* 19.1* 19.8*   PLT  --  172 189 213     INR  Recent Labs   Lab 05/02/24  0449 05/01/24  1828 05/01/24  1101   INR 1.30* 1.33* 1.41*     LFTs  Recent Labs   Lab 05/01/24  1101   ALKPHOS 72   AST 42   ALT 23   BILITOTAL 0.5   PROTTOTAL 6.7   ALBUMIN 3.6      PANCNo lab results found in last 7 days.      IMAGING:     CT ABDOMEN PELVIS W/O CONTRAST, 5/1/2024 11:46 AM   INDICATION: abd pain, recent surgery for SBO, now having melena in  addition to pain  FINDINGS:     Lower thorax: Linear fibroatelectasis in the lung bases.     Liver: No mass. No intrahepatic biliary ductal dilation.     Biliary System: Status post cholecystectomy. No extrahepatic biliary  ductal dilation.     Pancreas: No mass or pancreatic ductal dilation.     Adrenal glands: No mass or nodules     Spleen: Normal.     Kidneys: Atrophic appearance of the kidneys. No obstructing calculus  or hydronephrosis.     Gastrointestinal tract: No abnormally dilated loops of bowel.  Extensive colonic diverticulosis without evidence of acute  diverticulitis. Endoscopy clip in the ascending colon at the level of  the ileocecal valve.      Mesentery/peritoneum/retroperitoneum: No free air. Trace free fluid in  the pelvis. Small amount of intermediately attenuating fluid adjacent  to loops of small bowel in the right central  mesentery (series 5 image  92), likely postsurgical. Soft tissue thickening in the transverse  mesocolon. In the anterior abdomen, deep to the ventral abdominal wall  incision and mid to upper rectus abdominis musculature, there is fat  stranding with focal encapsulated fatty focus with a peripheral  hyperdense halo (series 5 image 64).     Lymph nodes: No significant lymphadenopathy.     Vasculature: Nonaneurysmal abdominal aorta with moderate aortobiiliac  atherosclerotic calcifications. Infrarenal IVC filter.     Pelvis: Urinary bladder is unremarkable.  The pelvis is partially  obscured by metallic streak artifact.     Osseous structures: No aggressive or acute osseous lesion.  Bilateral  hip arthroplasties.      Soft tissues: Postsurgical changes with ventral abdominal wall  laparotomy defect.  Small fat-containing periumbilical hernia.                                                                      IMPRESSION:   1. Postsurgical changes of the abdomen with ventral abdominal wall  incision. Immediately deep to the incision and rectus abdominis  musculature, there is stranding associated with lobules of fat, likely  evolving postsurgical edema and/or fat necrosis. Soft tissue  thickening in the transverse mesocolon and small amount of blood  products in the right central mesentery, also likely evolving  postsurgical change. No convincing evidence of acute hemorrhage or  drainable fluid collection on this noncontrast CT.  2. No abnormally dilated loops of bowel. No evidence of recurrent  bowel obstruction.  3. Trace free fluid in the pelvis. No free air.

## 2024-05-02 NOTE — TELEPHONE ENCOUNTER
Spoke with Thuy TURPIN and gave the following verbal order(s): Home Care Orders: Physical Therapy (PT): Okay for start of care on 5/4/2024 for pt eval and treat.  Jordyn DUQUE LPN  [facility]

## 2024-05-02 NOTE — CONSULTS
"[facility]    Consult Note - Emergency General Surgery Service  Date of Admission:  5/1/2024  Consult Requested by: Dr Velásquez  Reason for Consult: \"recently admitted for small bowel obstruction s/p laparotomy, here with melana. CT noting fat stranding and fat necrosis. Will like to evaluate for whether abx is recommended\"     Assessment & Plan: Surgery   Tessa Mansfield is a 89 year old female with PMH HTN, CAD s/p multiple stents (most recent 2023) on plavix, CKD, Afib on apixaban, and h/o colonic angiodysplasia, and recent ex lap and lysis of adhesion for closed-loop bowel obstruction in April 2024 who re-presented to hospital from TCU today 5/02/2024 due to episode of melaena without associated symptoms. EGS consulted due concerns associated with abdominal wall fat stranding and possible fat necrosis adjacent to patient's recent midline incision. No concerns for infection at this time. Patient has a reassuring physical exam without evidence of erythema, drainage, or fluctuance near her incision. She does endorse some mild tenderness to palpation, consistent with appropriate post-surgical incisional pain.    - no indication for abx from surgical perspective  - ok for staples to be removed  - EGS will sign off. Please do not hesitate to contact us with any questions or concerns      Clinically Significant Risk Factors Present on Admission               # Drug Induced Coagulation Defect: home medication list includes an anticoagulant medication  # Drug Induced Platelet Defect: home medication list includes an antiplatelet medication   # Hypertension: Noted on problem list  # Chronic heart failure with preserved ejection fraction: heart failure noted on problem list and last echo with EF >50%     # Overweight: Estimated body mass index is 25.58 kg/m  as calculated from the following:    Height as of this encounter: 1.676 m (5' 6\").    Weight as of 4/19/24: 71.9 kg " (158 lb 8.2 oz).       # Financial/Environmental Concerns: none   # Pacemaker present     The patient's care was discussed with the Attending Physician, Dr. Trammell and Chief Resident/Fellow, Dr Valentine.    Nicol Gastelum MD  [facility]  Non-urgent messages: Securely message with [employer] (more info)  Text page via [facility] Paging/Directory     ______________________________________________________________________    Chief Complaint   melaena    History is obtained from the patient and chart review    History of Present Illness   Tessa Mansfield is a 89 year old female with PSH cholecystectomy and hysterectomy and PMH HTN, CAD s/p multiple stents (most recent 2023) on plavix, CKD, Afib on apixaban, and h/o colonic angiodysplasia who was admitted to the surgery service with closed-loop bowel obstruction in April 2024. During that admission, patient underwent ex lap with lysis of adhesion with temporary abdominal wall closure in the setting of concerns for bowel ischaemia. She was taken back to OR the following day, found to have healthy bowel, and thus underwent washout and abdominal wall closure at that time. She was discharged to TCU on 4/22. Patient was sent back to hospital today 5/01 due to episode of melaena. She denies any associated symptoms including abdominal pain, n/v, cp, sob, light-headedness, dizziness. She has been tolerating PO intake and had been passing normal BM and gas up until her bloody BM.     Workup reveals BMP with elevated Cr 1.77, high anion gap 17, CBC with hgb 8.2, Plt 172, and CT imaging with some soft tissue thickening in transverse mesocolon, likely post-surgical changes, and some fat stranding and possibly some fat necrosis in abdominal wall inferior to midline incision. EGS is consulted due to concerns associated with these findings.        Past Medical History    Past Medical History:   Diagnosis Date    CAD (coronary artery disease)      CAD s/p PCI+BMS to MRCA 10/2002, PCI to mLCX 2/2003, PCI+DESx2 to pLAD 11/2007, PCI+DESx1 to dRCA 12/2007, PCI+ALVAREZ to RPDA 7/2013; on indefinite DAPT  10/27/2002    10/27/2002: AMI s/p PCI+BMS (3.5x18mm Bx velocity) to mRCA 2/5/2003: Back pain; PCI+stent to mLCX c/b distal embolization/slow flow 4/11/2003: Unstable angina; PCI+stent (Hepacoat velocity) to mLAD 11/27/2007: PCI+DESx2 to pLAD (IVUS w/ calcification of LMCA and ulcerated plaque pLAD, 80% dRCA; also had 80% dLCX, too small to stent) 12/11/2007: Staged PCI. PCI+DESx1 (3.5x13mm Cypher) to dRCA (indefinite DAPT recommended at this time) 6/27/2008: Angina. mLCX stent 70% ISR. LAD and RCA stents patent. Myocardium at risk from LCX felt to be small, medical management preferred to PCI. 11/10/2009: Angina. Findings unchanged from 6/27/2008, FFR LAD 0.90. Medical management recommended. 7/23/2013: Unstable angina; PCI+ALVAREZ to mPDA. Diagnostic findings: LMCA 40% distal. LAD: pLAD stents patent mLAD 30% ISR dLAD 50% diffuse, D2 diffuse disease. LCX 80% mid ISR. RCA diffuse <30% mid, RPLAs diffuse disease, RPDA 100% occlusion.      Cardiac Pacemaker- [employer]tr[employer], dual chamber- NOT dependant 11/28/2007    CKD (chronic kidney disease), stage IV (H)     Hyperlipidemia LDL goal <70     Hypertension     Stented coronary artery 10-    RCA    Stented coronary artery 2-5-2003    LCx    Stented coronary artery 4-    LAD    Stented coronary artery 11-    LAD    Stented coronary artery 12-    RCA    Transient complete heart block (H) 11/28/2007       Past Surgical History   Past Surgical History:   Procedure Laterality Date    CHOLECYSTECTOMY      CV CORONARY ANGIOGRAM N/A 6/17/2022    Procedure: Coronary Angiogram;  Surgeon: Constantine Macias MD;  Location:  HEART CARDIAC CATH LAB    CV CORONARY ANGIOGRAM N/A 7/17/2023    Procedure: Coronary Angiogram;  Surgeon: Constantine Macias MD;  [facility]    CV PCI N/A  6/17/2022    Procedure: Percutaneous Coronary Intervention;  Surgeon: Constantine Macias MD;  Location:  HEART CARDIAC CATH LAB    CV PCI N/A 7/17/2023    Procedure: Percutaneous Coronary Intervention;  Surgeon: Constantine Macias MD;  Location:  HEART CARDIAC CATH LAB    CV PCI N/A 11/6/2023    Procedure: Percutaneous Coronary Intervention;  Surgeon: Constantine Macias MD;  Location:  HEART CARDIAC CATH LAB    CV RIGHT HEART CATH MEASUREMENTS RECORDED N/A 6/17/2022    Procedure: Right Heart Catheterization;  Surgeon: Constantine Macias MD;  Location:  HEART CARDIAC CATH LAB    EP PACEMAKER N/A 10/15/2019    Procedure: EP PACEMAKER;  Surgeon: Anthony Zhu MD;  Location:  HEART CARDIAC CATH LAB    ESOPHAGOSCOPY, GASTROSCOPY, DUODENOSCOPY (EGD), COMBINED N/A 7/20/2023    Procedure: Esophagoscopy, gastroscopy, duodenoscopy (EGD), combined;  Surgeon: Bekah Dash DO;  Location: [facility]    HC PPM INSERTION NEW/REPLACEMENT W/ ATRIAL&VENTRICULAR LEAD  11-    HYSTERECTOMY      LAPAROTOMY EXPLORATORY N/A 4/13/2024    Procedure: Laparotomy exploratory, Wound Vac Placement.;  Surgeon: Anthony Trammell MD;  Location: [facility]    LAPAROTOMY EXPLORATORY N/A 4/14/2024    Procedure: Abdominal Re-Exploration and Closure;  Surgeon: Anthony Trammell MD;  [facility]: [facility]    LAPAROTOMY EXPLORATORY N/A 4/13/2024    Procedure: Exploratory Laparotomy;  Surgeon: Anthony Trammell MD;  Location: [facility]       Prior to Admission Medications   I have reviewed this patient's current medications  Medications Prior to Admission   Medication Sig Dispense Refill Last Dose    allopurinol (ZYLOPRIM) 100 MG tablet Take 1 tablet (100 mg) by mouth daily 90 tablet 0 4/30/2024    amLODIPine (NORVASC) 10 MG tablet Take 1 tablet (10 mg) by mouth daily 90 tablet 3 4/30/2024    apixaban ANTICOAGULANT (ELIQUIS) 2.5 MG tablet Take 1 tablet (2.5 mg) by mouth 2 times daily 180 tablet 3 4/30/2024    clopidogrel  (PLAVIX) 75 MG tablet Take 1 tablet (75 mg) by mouth daily 90 tablet 1 4/30/2024    cyanocolbalamin (VITAMIN B-12) 1000 MCG tablet Take 500 mcg by mouth daily As directed   4/30/2024    FEROSUL 325 (65 Fe) MG tablet TAKE 1 TABLET BY MOUTH EVERY OTHER DAY 45 tablet 0 4/30/2024    furosemide (LASIX) 40 MG tablet Take 1 tablet (40 mg) by mouth daily 30 tablet 0 4/30/2024    HYDROmorphone (DILAUDID) 2 MG tablet Take 1 tablet (2 mg) by mouth every 3 hours as needed for severe pain 10 tablet 0 Past Week    isosorbide mononitrate (ISMO/MONOKET) 20 MG tablet Take 2 tablets (40 mg) by mouth 3 times daily 540 tablet 3 4/30/2024    methocarbamol (ROBAXIN) 500 MG tablet Take 1 tablet (500 mg) by mouth every 6 hours as needed for muscle spasms 30 tablet 0 Past Week    metoprolol tartrate (LOPRESSOR) 50 MG tablet Take 1 tablet (50 mg) by mouth 2 times daily 60 tablet 1 4/30/2024    Multiple Vitamins-Minerals (PRESERVISION AREDS 2+MULTI VIT PO) Take by mouth 2 times daily   4/30/2024    omeprazole (PRILOSEC) 40 MG DR capsule Take 1 capsule (40 mg) by mouth daily 90 capsule 3 Unknown    rosuvastatin (CRESTOR) 20 MG tablet Take 1 tablet (20 mg) by mouth daily for 360 days 90 tablet 3 4/30/2024    vitamin D3 25 mcg (1000 units) tablet Take 1 tablet (25 mcg) by mouth every other day 90 tablet 3 Unknown    budesonide (PULMICORT) 0.5 MG/2ML neb solution Take 2 mLs (0.5 mg) by nebulization 2 times daily 2 mL 3     calcium carbonate 500 mg, elemental, (OSCAL 500) 1250 (500 Ca) MG TABS tablet Take 1 tablet by mouth daily       fluticasone-salmeterol (ADVAIR) 250-50 MCG/ACT inhaler INHALE 1 DOSE BY MOUTH TWICE DAILY 60 each 8     potassium chloride ER (K-TAB/KLOR-CON) 10 MEQ CR tablet Take 1 tablet (10 mEq) by mouth daily 90 tablet 3     psyllium (METAMUCIL/KONSYL) Packet Take 1 packet by mouth daily 30 each 0     timolol maleate (TIMOPTIC) 0.5 % ophthalmic solution INSTILL 1 DROP INTO EACH EYE ONCE DAILY IN THE MORNING             Review  "of Systems    The 10 point Review of Systems is negative other than noted in the HPI or here.     Social History   I have reviewed this patient's social history and updated it with pertinent information if needed.  Social History     Tobacco Use    Smoking status: Former     Current packs/day: 0.30     Average packs/day: 0.3 packs/day for 15.0 years (4.5 ttl pk-yrs)     Types: Cigarettes    Smokeless tobacco: Never    Tobacco comments:     Quit 35+ years ago   Substance Use Topics    Drug use: No         Allergies   Allergies   Allergen Reactions    Ciprofloxacin Rash    Codeine Sulfate Itching    Shrimp Swelling    Bactrim [Sulfamethoxazole W/Trimethoprim]      Patient unable to recall    Biaxin [Clarithromycin]     Chlorthalidone Nausea and Vomiting    Clonidine     Darvon [Propoxyphene Hcl]     Dilaudid [Hydromorphone] Visual Disturbance and Hallucination    Gabapentin Fatigue and Confusion     \"felt drunk\"    Indomethacin     Levaquin [Levofloxacin Hemihydrate]     Morphine Sulfate     Percocet [Oxycodone-Acetaminophen] Hallucination    Pregabalin Itching and Fatigue    Simvastatin Palpitations     Muscle weakness, leg cramping      Spironolactone      Dehydrated      Terazosin         Physical Exam   Vital Signs: Temp: 98.1  F (36.7  C) Temp src: Oral BP: (!) 160/88 Pulse: 71   Resp: 16 SpO2: 98 % O2 Device: None (Room air)    Weight: 0 lbs 0 ozNo intake or output data in the 24 hours ending 05/01/24 2147    General Appearance: Laying in bed, NAD  Respiratory: NLB on RA  Cardiovascular: irregular rhythm on radial palpation  GI: soft, non distended, non tender to deep palpation, no peritoneal signs, upper midline incision with staples in place, no erythema, no drainage  Skin: warm, well perfused            Data   ------------------------- PAST 24 HR DATA REVIEWED -----------------------------------------------    I have personally reviewed the following data over the past 24 hrs:    5.4  \   8.2 (L)   / 172 "     142 105 43.3 (H) /  93   3.7 20 (L) 1.77 (H) \     ALT: 23 AST: 42 AP: 72 TBILI: 0.5   ALB: 3.6 TOT PROTEIN: 6.7 LIPASE: N/A     INR:  1.33 (H) PTT:  38   D-dimer:  N/A Fibrinogen:  N/A     Ferritin:  264 % Retic:  2.9 (H) LDH:  317 (H)       Imaging results reviewed over the past 24 hrs:   Recent Results (from the past 24 hour(s))   CT Abdomen Pelvis w/o Contrast    Narrative    EXAMINATION: CT ABDOMEN PELVIS W/O CONTRAST, 5/1/2024 11:46 AM    INDICATION: abd pain, recent surgery for SBO, now having melena in  addition to pain    COMPARISON STUDY: CT 4/13/2024    TECHNIQUE: CT scan of the abdomen and pelvis was performed on  multidetector CT scanner using volumetric acquisition technique and  images were reconstructed in multiple planes with variable thickness  and reviewed on dedicated workstations.     CONTRAST: Without contrast.    CT scan radiation dose is optimized to minimum requisite dose using  automated dose modulation techniques.    FINDINGS:    Lower thorax: Linear fibroatelectasis in the lung bases.    Liver: No mass. No intrahepatic biliary ductal dilation.    Biliary System: Status post cholecystectomy. No extrahepatic biliary  ductal dilation.    Pancreas: No mass or pancreatic ductal dilation.    Adrenal glands: No mass or nodules    Spleen: Normal.    Kidneys: Atrophic appearance of the kidneys. No obstructing calculus  or hydronephrosis.    Gastrointestinal tract: No abnormally dilated loops of bowel.  Extensive colonic diverticulosis without evidence of acute  diverticulitis. Endoscopy clip in the ascending colon at the level of  the ileocecal valve.     Mesentery/peritoneum/retroperitoneum: No free air. Trace free fluid in  the pelvis. Small amount of intermediately attenuating fluid adjacent  to loops of small bowel in the right central mesentery (series 5 image  92), likely postsurgical. Soft tissue thickening in the transverse  mesocolon. In the anterior abdomen, deep to the ventral  abdominal wall  incision and mid to upper rectus abdominis musculature, there is fat  stranding with focal encapsulated fatty focus with a peripheral  hyperdense halo (series 5 image 64).    Lymph nodes: No significant lymphadenopathy.    Vasculature: Nonaneurysmal abdominal aorta with moderate aortobiiliac  atherosclerotic calcifications. Infrarenal IVC filter.    Pelvis: Urinary bladder is unremarkable.  The pelvis is partially  obscured by metallic streak artifact.    Osseous structures: No aggressive or acute osseous lesion.  Bilateral  hip arthroplasties.      Soft tissues: Postsurgical changes with ventral abdominal wall  laparotomy defect.  Small fat-containing periumbilical hernia.      Impression    IMPRESSION:   1. Postsurgical changes of the abdomen with ventral abdominal wall  incision. Immediately deep to the incision and rectus abdominis  musculature, there is stranding associated with lobules of fat, likely  evolving postsurgical edema and/or fat necrosis. Soft tissue  thickening in the transverse mesocolon and small amount of blood  products in the right central mesentery, also likely evolving  postsurgical change. No convincing evidence of acute hemorrhage or  drainable fluid collection on this noncontrast CT.  2. No abnormally dilated loops of bowel. No evidence of recurrent  bowel obstruction.  3. Trace free fluid in the pelvis. No free air.    I have personally reviewed the examination and initial interpretation  and I agree with the findings.    KATHERIN COSTELLO,          SYSTEM ID:  [id number]

## 2024-05-02 NOTE — PLAN OF CARE
"BP (!) 150/67 (BP Location: Right arm)   Pulse 86   Temp 97.6  F (36.4  C) (Oral)   Resp 16   Ht 1.676 m (5' 6\")   SpO2 99%   BMI 25.58 kg/m      Pt has hx of HTN, BP max 160/88, below paging parameters and pt feeling well. OVSS on RA. Pt denies pain, N/V, SOB, dizziness. Loose, black BM x3 to BSC, one incontinent. Providers aware of dark stools. C diff negative. Assist x1, steady on feet, bed alarm on and pt calls appropriately. Skin double check completed with Kari FERNANDEZ RN. Abdominal incision closed with staples, WNR. Multiple bruises scattered over body. No replacement protocols ordered, hgb and platelets above parameters. She slept well between cares. Plan for EGD today, has been NPO since midnight.    Problem: Adult Inpatient Plan of Care  Goal: Plan of Care Review  Description: The Plan of Care Review/Shift note should be completed every shift.  The Outcome Evaluation is a brief statement about your assessment that the patient is improving, declining, or no change.  This information will be displayed automatically on your shift  note.  Outcome: Progressing  Goal: Absence of Hospital-Acquired Illness or Injury  Outcome: Progressing  Intervention: Identify and Manage Fall Risk  Recent Flowsheet Documentation  Taken 5/2/2024 0230 by Melva Haywood RN  Safety Promotion/Fall Prevention:   activity supervised   assistive device/personal items within reach   clutter free environment maintained   nonskid shoes/slippers when out of bed   patient and family education   room organization consistent   safety round/check completed  Taken 5/2/2024 0015 by Melva Haywood RN  Safety Promotion/Fall Prevention:   activity supervised   assistive device/personal items within reach   clutter free environment maintained   nonskid shoes/slippers when out of bed   patient and family education   room organization consistent   safety round/check completed  Taken 5/1/2024 2030 by Melva Haywood RN  Safety Promotion/Fall " Prevention:   activity supervised   assistive device/personal items within reach   clutter free environment maintained   nonskid shoes/slippers when out of bed   patient and family education   room organization consistent   safety round/check completed  Intervention: Prevent Skin Injury  Recent Flowsheet Documentation  Taken 5/1/2024 2030 by Melva Haywood RN  Body Position: position changed independently  Intervention: Prevent and Manage VTE (Venous Thromboembolism) Risk  Recent Flowsheet Documentation  Taken 5/1/2024 2030 by Melva Haywood RN  VTE Prevention/Management: SCDs (sequential compression devices) off  Intervention: Prevent Infection  Recent Flowsheet Documentation  Taken 5/2/2024 0015 by Melva Haywood RN  Infection Prevention:   environmental surveillance performed   hand hygiene promoted   personal protective equipment utilized   rest/sleep promoted   single patient room provided  Taken 5/1/2024 2030 by Melva Haywood RN  Infection Prevention:   environmental surveillance performed   equipment surfaces disinfected   hand hygiene promoted   personal protective equipment utilized   rest/sleep promoted   single patient room provided   visitors restricted/screened  Goal: Optimal Comfort and Wellbeing  Outcome: Progressing     Problem: Gastrointestinal Bleeding  Goal: Optimal Coping with Acute Illness  Outcome: Progressing  Intervention: Optimize Psychosocial Response  Recent Flowsheet Documentation  Taken 5/1/2024 2030 by Melva Haywood RN  Supportive Measures:   active listening utilized   positive reinforcement provided   self-care encouraged   verbalization of feelings encouraged  Goal: Hemostasis  Outcome: Progressing  Intervention: Manage Gastrointestinal Bleeding  Recent Flowsheet Documentation  Taken 5/1/2024 2030 by Melva Haywood RN  Environmental Support:   calm environment promoted   distractions minimized   environmental consistency promoted   personal routine supported      Problem: Fall Injury Risk  Goal: Absence of Fall and Fall-Related Injury  Outcome: Progressing  Intervention: Identify and Manage Contributors  Recent Flowsheet Documentation  Taken 5/2/2024 0015 by Melva Haywood RN  Medication Review/Management: medications reviewed  Taken 5/1/2024 2030 by Melva Haywood RN  Medication Review/Management: medications reviewed  Intervention: Promote Injury-Free Environment  Recent Flowsheet Documentation  Taken 5/2/2024 0230 by Melva Haywood RN  Safety Promotion/Fall Prevention:   activity supervised   assistive device/personal items within reach   clutter free environment maintained   nonskid shoes/slippers when out of bed   patient and family education   room organization consistent   safety round/check completed  Taken 5/2/2024 0015 by Melva Haywood RN  Safety Promotion/Fall Prevention:   activity supervised   assistive device/personal items within reach   clutter free environment maintained   nonskid shoes/slippers when out of bed   patient and family education   room organization consistent   safety round/check completed  Taken 5/1/2024 2030 by Melva Haywood RN  Safety Promotion/Fall Prevention:   activity supervised   assistive device/personal items within reach   clutter free environment maintained   nonskid shoes/slippers when out of bed   patient and family education   room organization consistent   safety round/check completed

## 2024-05-02 NOTE — ANESTHESIA POSTPROCEDURE EVALUATION
Patient: Tessa Mansfield    Procedure: Procedure(s):  Esophagoscopy, gastroscopy, duodenoscopy (EGD), combined       Anesthesia Type:  MAC    Note:  Disposition: Inpatient   Postop Pain Control: Uneventful            Sign Out: Well controlled pain   PONV: No   Neuro/Psych: Uneventful            Sign Out: Acceptable/Baseline neuro status   Airway/Respiratory: Uneventful            Sign Out: Acceptable/Baseline resp. status   CV/Hemodynamics: Uneventful            Sign Out: Acceptable CV status; No obvious hypovolemia; No obvious fluid overload   Other NRE: NONE   DID A NON-ROUTINE EVENT OCCUR? No           Last vitals:  Vitals Value Taken Time   /63 05/02/24 1240   Temp 36.4  C (97.6  F) 05/02/24 1240   Pulse 79 05/02/24 1240   Resp 18 05/02/24 1240   SpO2 100 % 05/02/24 1240       Electronically Signed By: Feliberto Carr MD  May 2, 2024  12:45 PM

## 2024-05-02 NOTE — OR NURSING
Patient brought to PACU for inpatient MAC eval; patient meets criteria to return to inpatient unit and bypass PACU admission. Anesthesia team provided report to inpatient RN; patient placed on pulse ox and transported to inpatient bed assignment.

## 2024-05-02 NOTE — PROGRESS NOTES
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 She is hemodynamically stable with stable hemoglobin, and well appearing. She is being admitted with GI consult for EGD.        Changes today:   - EGD today   - potassium replacement       Melana with Azotemia   Anemia, chronic   History of bleeding colonic angiodysplastic lesion in the ascending colon s/p clips 7/2023  Colonic polyps  Diverticulosis   Presenting with one day of melana. Hgb 8.1, near baseline 8-10. Normal vitals. Not tachycardic however on beta blocker pta. CT abdomen/pelvis without acute hemorrhage/fluid collection  - GI consulted, EGD today  - s/p IV PPI 40mg in the ID, continue PPI 40mg BID   - hold plavix and clopidogrel   - Check b12, folate, iron and iron binding capacity, ferritin, retic, LDH, peripheral smear   - place 2 large bore Ivs  - Monitor hgb, transfuse if less than 7 or hemodynamically unstable   - Avoid NSAIDs     Recent admission (4/2023) for small bowel obstruction with possible ischemia  Evolving postsurgical edema and/or fat necrosis  She was admitted 4/13 to 4/22. She  "underwent laporoscopy on 4/13 c/b bleeding with return to the OR 4/13/24. Abdominal closure completed 4/14.  CT abdomen/pelvis here notable for \"Postsurgical changes of the abdomen with ventral abdominal wall incision. Immediately deep to the incision and rectus abdominis musculature, there is stranding associated with lobules of fat, likely evolving postsurgical edema and/or fat necrosis. Soft tissue  thickening in the transverse mesocolon and small amount of blood products in the right central mesentery, also likely evolving surgical change\". She is not having new abdominal pain. Has previous pain that is present at the superior portion of her abdominal incision.   - Surgery consulted given concern for fat necrosis - nothing to do they will remove the stables         -----------------chronic-----------------------------  CAD s/p multiple stents (most recent 11/2023)  Sick sinus syndrom s/p dual PPM   Paroxysmal atrial fibrillation  Mild-moderate mitral regurgitation   - holding pta furosemide   - hold isorbide mononitrate   - hold metoprolol         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   HTN  - hold amlodipine   - holding furosemide   - hold potassium chloride         ?gout  - pta allopurinol         HLD  - pta rosuvastatin        Diet: NPO per Anesthesia Guidelines for Procedure/Surgery Except for: Meds    DVT Prophylaxis: none, brady   Bonner Catheter: Not present  Fluids: none   Lines: None     Cardiac Monitoring: None  Code Status: Full Code      Clinically Significant Risk Factors        # Hypokalemia: Lowest K = 3.3 mmol/L in last 2 days, will replace as needed   # Hypocalcemia: Lowest Ca = 8.4 mg/dL in last 2 days, will monitor and replace as appropriate      # Coagulation Defect: INR = 1.30 (Ref range: 0.85 - 1.15) and/or PTT = 40 Seconds (Ref range: 22 - 38 Seconds), will monitor for bleeding    # Hypertension: Noted on problem list  # Chronic heart failure with preserved ejection fraction: heart " failure noted on problem list and last echo with EF >50%           # Financial/Environmental Concerns: none   # Pacemaker present       Disposition Plan     Expected Discharge Date: 05/03/2024      Destination: home with family;home with help/services          The patient's care was discussed with the Attending Physician, Dr. Velásquez .    Sabrina Torres MD  Medicine Service, [facility] TEAM 4  [facility]  Securely message with [employer] (more info)  Text page via [employer] Paging/Directory   See signed in provider for up to date coverage information  ______________________________________________________________________    Interval History   No acute overnight events. No new changes today. No new abdominal pain. Had one melana yesterday evening and another today. No CP/SOB/abd pain/nausea/vomiting     Physical Exam   Vital Signs: Temp: 97.5  F (36.4  C) Temp src: Oral BP: (!) 159/48 Pulse: 80   Resp: 20 SpO2: 100 % O2 Device: None (Room air) Oxygen Delivery: 2 LPM  Weight: 143 lbs 3.2 oz    General: Well appearing, nontoxic  HEENT: MMM  CV: RRR, no murmurs heard, extremities perfused, no lower extremity edema   Resp: breathing comfortably on room air, CTAB  Abd: soft, nondistended, mild tenderness around the abdominal incision staples. No redness around the abdominal incision. No swelling.   Neuro: moving all extremities, upper and lower extremity 5/5    Medical Decision Making       Please see A&P for additional details of medical decision making.      Data   ------------------------- PAST 24 HR DATA REVIEWED -----------------------------------------------

## 2024-05-02 NOTE — ANESTHESIA PREPROCEDURE EVALUATION
Anesthesia Pre-Procedure Evaluation    Patient: Tessa Mansfield   MRN: [id number] : 1935        Procedure : Procedure(s):  Esophagoscopy, gastroscopy, duodenoscopy (EGD), combined          Past Medical History:   Diagnosis Date    CAD (coronary artery disease)     CAD s/p PCI+BMS to MRCA 10/2002, PCI to mLCX 2003, PCI+DESx2 to pLAD 2007, PCI+DESx1 to dRCA 2007, PCI+ALVAREZ to RPDA 2013; on indefinite DAPT  10/27/2002    10/27/2002: AMI s/p PCI+BMS (3.5x18mm Bx velocity) to mRCA 2003: Back pain; PCI+stent to mLCX c/b distal embolization/slow flow 2003: Unstable angina; PCI+stent (Hepacoat velocity) to mLAD 2007: PCI+DESx2 to pLAD (IVUS w/ calcification of LMCA and ulcerated plaque pLAD, 80% dRCA; also had 80% dLCX, too small to stent) 2007: Staged PCI. PCI+DESx1 (3.5x13mm Cypher) to dRCA (indefinite DAPT recommended at this time) 2008: Angina. mLCX stent 70% ISR. LAD and RCA stents patent. Myocardium at risk from LCX felt to be small, medical management preferred to PCI. 11/10/2009: Angina. Findings unchanged from 2008, FFR LAD 0.90. Medical management recommended. 2013: Unstable angina; PCI+ALVAREZ to mPDA. Diagnostic findings: LMCA 40% distal. LAD: pLAD stents patent mLAD 30% ISR dLAD 50% diffuse, D2 diffuse disease. LCX 80% mid ISR. RCA diffuse <30% mid, RPLAs diffuse disease, RPDA 100% occlusion.      Cardiac Pacemaker- Medtronic, dual chamber- NOT dependant 2007    CKD (chronic kidney disease), stage IV (H)     Hyperlipidemia LDL goal <70     Hypertension     Stented coronary artery 10-    RCA    Stented coronary artery 2003    LCx    Stented coronary artery 2003    LAD    Stented coronary artery 2007    LAD    Stented coronary artery 2007    RCA    Transient complete heart block (H) 2007      Past Surgical History:   Procedure Laterality Date    CHOLECYSTECTOMY      CV CORONARY ANGIOGRAM N/A 2022    Procedure: Coronary  Angiogram;  Surgeon: Constantine Macias MD;  Location:  HEART CARDIAC CATH LAB    CV CORONARY ANGIOGRAM N/A 7/17/2023    Procedure: Coronary Angiogram;  Surgeon: Constantine Macias MD;  Location:  HE[facility] CARDI[facility] CATH LAB    CV PCI N/A 6/17/2022    Procedure: Percutaneous Coronary Intervention;  Surgeon: Constantine Macias MD;  Location:  HEART CARDIAC CATH LAB    CV PCI N/A 7/17/2023    Procedure: Percutaneous Coronary Intervention;  Surgeon: Constantine Macias MD;  Location:  HEART CARDIAC CATH LAB    CV PCI N/A 11/6/2023    Procedure: Percutaneous Coronary Intervention;  Surgeon: Constantine Macias MD;  Location:  HEART CARDIAC CATH LAB    CV RIGHT HEART CATH MEASUREMENTS RECORDED N/A 6/17/2022    Procedure: Right Heart Catheterization;  Surgeon: Constantine Macias MD;  Location:  HEART CARDIAC CATH LAB    EP PACEMAKER N/A 10/15/2019    Procedure: EP PACEMAKER;  Surgeon: Anthony Zhu MD;  Location:  HEART CARDIAC CATH LAB    ESOPHAGOSCOPY, GASTROSCOPY, DUODENOSCOPY (EGD), COMBINED N/A 7/20/2023    Procedure: Esophagoscopy, gastroscopy, duodenoscopy (EGD), combined;  Surgeon: Bekah Dash DO;  [facility]: [facility]    HC PPM INSERTION NEW/REPLACEMENT W/ ATRIAL&VENTRICULAR LEAD  11-    HYSTERECTOMY      LAPAROTOMY EXPLORATORY N/A 4/13/2024    Procedure: Laparotomy exploratory, Wound Vac Placement.;  Surgeon: Anthony Trammell MD;  [facility]: [facility]    LAPAROTOMY EXPLORATORY N/A 4/14/2024    Procedure: Abdominal Re-Exploration and Closure;  Surgeon: Anthony Trammell MD;  [facility]    LAPAROTOMY EXPLORATORY N/A 4/13/2024    Procedure: Exploratory Laparotomy;  Surgeon: Anthony Trammell MD;  [facility]      Allergies   Allergen Reactions    Ciprofloxacin Rash    Codeine Sulfate Itching    Shrimp Swelling    Bactrim [Sulfamethoxazole W/Trimethoprim]      Patient unable to recall    Biaxin [Clarithromycin]     Chlorthalidone Nausea and Vomiting     "Clonidine     Darvon [Propoxyphene Hcl]     Dilaudid [Hydromorphone] Visual Disturbance and Hallucination    Gabapentin Fatigue and Confusion     \"felt drunk\"    Indomethacin     Levaquin [Levofloxacin Hemihydrate]     Morphine Sulfate     Percocet [Oxycodone-Acetaminophen] Hallucination    Pregabalin Itching and Fatigue    Simvastatin Palpitations     Muscle weakness, leg cramping      Spironolactone      Dehydrated      Terazosin       Social History     Tobacco Use    Smoking status: Former     Current packs/day: 0.30     Average packs/day: 0.3 packs/day for 15.0 years (4.5 ttl pk-yrs)     Types: Cigarettes    Smokeless tobacco: Never    Tobacco comments:     Quit 35+ years ago   Substance Use Topics    Alcohol use: Not on file      Wt Readings from Last 1 Encounters:   05/02/24 65 kg (143 lb 3.2 oz)        Anesthesia Evaluation   Pt has had prior anesthetic. Type: General.    History of anesthetic complications   See anesthesia note 4/13/24.    ROS/MED HX  ENT/Pulmonary:     (+)                      asthma                  Neurologic:  - neg neurologic ROS  (-) no CVA   Cardiovascular: Comment: CAD s/p multiple stents (most recent 11/2023)  Sick sinus syndrom s/p dual PPM   Paroxysmal atrial fibrillation  Mild-moderate mitral regurgitation     (+)  hypertension- -  CAD - past MI - stent-7/1723. 3 Drug Eluting Stent. Taking blood thinners   CHF  Last EF: 60%           ICD Reason placed:AP: 70.5% : <0.1% Presenting EGM: NSR with PVCs @ 92 bpm Short V-V intervals: 0 Lead Trends Appear Stable: Yes Estimated battery longevity to RRT = 9.2 years. Battery voltage = 3.00 V. Atrial Arrhythmia: Total AT/AF time = 9 seconds. AF [place]: <0.1% Anticoagulant: Eliquis Ventricular Arrhythmia: 0.  type;Medtronic [id number] [place] XT DR MRI Normal Device Function Settings AAIR<=>DDDR  bpm - Patient is dependent on ICD. dysrhythmias, a-fib and Sick Sinus Syndrome,        Previous cardiac testing   Echo: Date: 10/23 " Results:  Interpretation Summary  Global and regional left ventricular function is normal with an EF of 55-60%.  Right ventricular function, chamber size, wall motion, and thickness are  normal.  Mild to moderate mitral insufficiency is present.  Mild tricuspid insufficiency is present.  The right ventricular systolic pressure is 35mmHg above the right atrial  pressure.  The inferior vena cava is normal.  No pericardial effusion is present.  Stress Test:  Date: Results:    ECG Reviewed:  Date: Results:    Cath:  Date: Results:      METS/Exercise Tolerance:     Hematologic: Comments: Thrombocytopenia, coagulopathic    (+)      anemia, history of blood transfusion, no previous transfusion reaction,        Musculoskeletal:  - neg musculoskeletal ROS     GI/Hepatic: Comment: SBO sp ex lap x3 4/13.   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 s/p clips 7/2023  Colonic polyps  Diverticulosis       (+) GERD,                   Renal/Genitourinary:     (+) renal disease, type: CRI,            Endo:  - neg endo ROS     Psychiatric/Substance Use:  - neg psychiatric ROS     Infectious Disease:  - neg infectious disease ROS     Malignancy:  - neg malignancy ROS     Other:            Physical Exam    Airway        Mallampati: II   TM distance: > 3 FB   Neck ROM: full   Mouth opening: > 3 cm    Respiratory Devices and Support         Dental       (+) Minor Abnormalities - some fillings, tiny chips      Cardiovascular   cardiovascular exam normal          Pulmonary   pulmonary exam normal                OUTSIDE LABS:  CBC:   Lab Results   Component Value Date    WBC 5.7 05/02/2024    WBC 5.4 05/01/2024    HGB 7.7 (L) 05/02/2024    HGB 8.2 (L) 05/01/2024    HCT 25.4 (L) 05/02/2024    HCT 26.2 (L) 05/01/2024    PLT  05/02/2024      Comment:      Platelets clumped. Platelet count not available.  This is a corrected result. Previous result was 147 10e3/uL on 5/2/2024  at  6:52 AM CDT     05/01/2024     BMP:   Lab Results   Component Value Date     05/02/2024     05/01/2024    POTASSIUM 3.3 (L) 05/02/2024    POTASSIUM 3.7 05/01/2024    CHLORIDE 112 (H) 05/02/2024    CHLORIDE 105 05/01/2024    CO2 19 (L) 05/02/2024    CO2 20 (L) 05/01/2024    BUN 33.0 (H) 05/02/2024    BUN 43.3 (H) 05/01/2024    CR 1.59 (H) 05/02/2024    CR 1.77 (H) 05/01/2024    GLC 91 05/02/2024    GLC 93 05/01/2024     COAGS:   Lab Results   Component Value Date    PTT 40 (H) 05/02/2024    INR 1.30 (H) 05/02/2024    FIBR 161 (L) 04/13/2024     POC:   Lab Results   Component Value Date    BGM 86 01/31/2006     HEPATIC:   Lab Results   Component Value Date    ALBUMIN 3.6 05/01/2024    PROTTOTAL 6.7 05/01/2024    ALT 23 05/01/2024    AST 42 05/01/2024    ALKPHOS 72 05/01/2024    BILITOTAL 0.5 05/01/2024     OTHER:   Lab Results   Component Value Date    PH 7.33 (L) 04/15/2024    LACT 1.8 04/14/2024    ALEXANDRO 8.4 (L) 05/02/2024    PHOS 3.3 04/22/2024    MAG 2.0 04/22/2024    LIPASE 69 (H) 04/13/2024    TSH 3.28 09/29/2021    CRP 3.5 10/01/2021    SED 37 (H) 07/31/2013       Anesthesia Plan    ASA Status:  3    NPO Status:  NPO Appropriate    Anesthesia Type: MAC.     - Reason for MAC: straight local not clinically adequate   Induction: N/a.   Maintenance: N/A.        Consents    Anesthesia Plan(s) and associated risks, benefits, and realistic alternatives discussed. Questions answered and patient/representative(s) expressed understanding.     - Discussed:     - Discussed with:  Patient      - Extended Intubation/Ventilatory Support Discussed: No.      - Patient is DNR/DNI Status: No     Use of blood products discussed: No .     Postoperative Care    Pain management: Multi-modal analgesia, Oral pain medications.   PONV prophylaxis: Background Propofol Infusion     Comments:               Donell Bravo MD    I have reviewed the pertinent notes and labs in the chart from the past 30 days and  (re)examined the patient.  Any updates or changes from those notes are reflected in this note.    # Hypokalemia: Lowest K = 3.3 mmol/L in last 2 days, will replace as needed  # Hypernatremia: Highest Na = 146 mmol/L in last 30 days, will monitor as appropriate   # Hypocalcemia: Lowest Ca = 8.4 mg/dL in last 2 days, will monitor and replace as appropriate  # Hypomagnesemia: Lowest Mg = 1.3 mg/dL in last 30 days, will replace as needed   # Hypoalbuminemia: Lowest albumin = 2.9 g/dL in the past 30 days , will monitor as appropriate   # Drug Induced Coagulation Defect: home medication list includes an anticoagulant medication  # Drug Induced Platelet Defect: home medication list includes an antiplatelet medication

## 2024-05-02 NOTE — TELEPHONE ENCOUNTER
[facility] Call Center    Phone Message    May a detailed message be left on voicemail: yes     Reason for Call: Order(s): Home Care Orders: Physical Therapy (PT): Okay for start of care on 5/4/2024 for pt eval and treat.    Action Taken: Message routed to:  [facility] ([facility] PCC    Travel Screening: Not Applicable

## 2024-05-02 NOTE — PROGRESS NOTES
Patient admitted to:  rm [id number]  Admitted from: ED  Arrived by: liter   Reason for admission: upper GI bleed  Patient accompanied by: self   Belongings: with pt   Teaching: admission teaching per unit protocol   Skin double check completed by: Not yet completed, passed on to next shift.

## 2024-05-03 ENCOUNTER — APPOINTMENT (OUTPATIENT)
Dept: OCCUPATIONAL THERAPY | Facility: CLINIC | Age: 89
DRG: 378 | End: 2024-05-03
Attending: INTERNAL MEDICINE
Payer: COMMERCIAL

## 2024-05-03 LAB
BLD PROD TYP BPU: NORMAL
BLOOD COMPONENT TYPE: NORMAL
CODING SYSTEM: NORMAL
CROSSMATCH: NORMAL
ERYTHROCYTE [DISTWIDTH] IN BLOOD BY AUTOMATED COUNT: 19.2 % (ref 10–15)
HCT VFR BLD AUTO: 22.8 % (ref 35–47)
HGB BLD-MCNC: 7.1 G/DL (ref 11.7–15.7)
ISSUE DATE AND TIME: NORMAL
MCH RBC QN AUTO: 28 PG (ref 26.5–33)
MCHC RBC AUTO-ENTMCNC: 31.1 G/DL (ref 31.5–36.5)
MCV RBC AUTO: 90 FL (ref 78–100)
PLATELET # BLD AUTO: 159 10E3/UL (ref 150–450)
POTASSIUM SERPL-SCNC: 3.6 MMOL/L (ref 3.4–5.3)
RBC # BLD AUTO: 2.54 10E6/UL (ref 3.8–5.2)
UNIT ABO/RH: NORMAL
UNIT NUMBER: NORMAL
UNIT STATUS: NORMAL
UNIT TYPE ISBT: 9500
UPPER GI ENDOSCOPY: NORMAL
WBC # BLD AUTO: 2.8 10E3/UL (ref 4–11)

## 2024-05-03 PROCEDURE — 94640 AIRWAY INHALATION TREATMENT: CPT | Mod: 76

## 2024-05-03 PROCEDURE — 84132 ASSAY OF SERUM POTASSIUM: CPT | Performed by: INTERNAL MEDICINE

## 2024-05-03 PROCEDURE — 99232 SBSQ HOSP IP/OBS MODERATE 35: CPT | Mod: GC | Performed by: INTERNAL MEDICINE

## 2024-05-03 PROCEDURE — 85027 COMPLETE CBC AUTOMATED: CPT | Performed by: INTERNAL MEDICINE

## 2024-05-03 PROCEDURE — 999N000157 HC STATISTIC RCP TIME EA 10 MIN

## 2024-05-03 PROCEDURE — 250N000011 HC RX IP 250 OP 636

## 2024-05-03 PROCEDURE — 120N000005 HC R&B MS OVERFLOW UMMC

## 2024-05-03 PROCEDURE — 250N000009 HC RX 250: Performed by: INTERNAL MEDICINE

## 2024-05-03 PROCEDURE — 250N000011 HC RX IP 250 OP 636: Performed by: INTERNAL MEDICINE

## 2024-05-03 PROCEDURE — 97530 THERAPEUTIC ACTIVITIES: CPT | Mod: GO

## 2024-05-03 PROCEDURE — 250N000013 HC RX MED GY IP 250 OP 250 PS 637

## 2024-05-03 PROCEDURE — P9016 RBC LEUKOCYTES REDUCED: HCPCS | Performed by: INTERNAL MEDICINE

## 2024-05-03 PROCEDURE — 36415 COLL VENOUS BLD VENIPUNCTURE: CPT | Performed by: INTERNAL MEDICINE

## 2024-05-03 PROCEDURE — 94640 AIRWAY INHALATION TREATMENT: CPT

## 2024-05-03 PROCEDURE — 97165 OT EVAL LOW COMPLEX 30 MIN: CPT | Mod: GO

## 2024-05-03 PROCEDURE — 250N000013 HC RX MED GY IP 250 OP 250 PS 637: Performed by: INTERNAL MEDICINE

## 2024-05-03 RX ORDER — ONDANSETRON 2 MG/ML
4 INJECTION INTRAMUSCULAR; INTRAVENOUS ONCE
Status: COMPLETED | OUTPATIENT
Start: 2024-05-03 | End: 2024-05-03

## 2024-05-03 RX ORDER — AMLODIPINE BESYLATE 10 MG/1
10 TABLET ORAL DAILY
Status: DISCONTINUED | OUTPATIENT
Start: 2024-05-03 | End: 2024-05-07 | Stop reason: HOSPADM

## 2024-05-03 RX ORDER — CLOPIDOGREL BISULFATE 75 MG/1
75 TABLET ORAL DAILY
Status: DISCONTINUED | OUTPATIENT
Start: 2024-05-03 | End: 2024-05-07 | Stop reason: HOSPADM

## 2024-05-03 RX ORDER — ISOSORBIDE MONONITRATE 20 MG/1
60 TABLET ORAL 2 TIMES DAILY
COMMUNITY
End: 2024-06-12

## 2024-05-03 RX ORDER — FERROUS SULFATE 325(65) MG
TABLET ORAL
COMMUNITY
End: 2024-06-20

## 2024-05-03 RX ORDER — ACETAMINOPHEN 325 MG/1
975 TABLET ORAL EVERY 8 HOURS
Status: DISCONTINUED | OUTPATIENT
Start: 2024-05-03 | End: 2024-05-07 | Stop reason: HOSPADM

## 2024-05-03 RX ORDER — FUROSEMIDE 40 MG
20 TABLET ORAL DAILY
COMMUNITY
End: 2024-05-30

## 2024-05-03 RX ORDER — ONDANSETRON 4 MG/1
4 TABLET, ORALLY DISINTEGRATING ORAL EVERY 6 HOURS PRN
Status: DISCONTINUED | OUTPATIENT
Start: 2024-05-03 | End: 2024-05-07 | Stop reason: HOSPADM

## 2024-05-03 RX ORDER — ACETAMINOPHEN 325 MG/1
975 TABLET ORAL
COMMUNITY

## 2024-05-03 RX ORDER — LIDOCAINE 4 G/G
2 PATCH TOPICAL
Status: DISCONTINUED | OUTPATIENT
Start: 2024-05-03 | End: 2024-05-07 | Stop reason: HOSPADM

## 2024-05-03 RX ORDER — ACETAMINOPHEN 325 MG/1
975 TABLET ORAL EVERY 8 HOURS PRN
Status: DISCONTINUED | OUTPATIENT
Start: 2024-05-03 | End: 2024-05-03

## 2024-05-03 RX ADMIN — AMLODIPINE BESYLATE 10 MG: 10 TABLET ORAL at 11:53

## 2024-05-03 RX ADMIN — ACETAMINOPHEN 975 MG: 325 TABLET, FILM COATED ORAL at 11:53

## 2024-05-03 RX ADMIN — BUDESONIDE 0.5 MG: 0.5 INHALANT RESPIRATORY (INHALATION) at 08:45

## 2024-05-03 RX ADMIN — Medication 3 MG: at 20:43

## 2024-05-03 RX ADMIN — ONDANSETRON 4 MG: 4 TABLET, ORALLY DISINTEGRATING ORAL at 18:03

## 2024-05-03 RX ADMIN — ROSUVASTATIN CALCIUM 10 MG: 10 TABLET, FILM COATED ORAL at 20:32

## 2024-05-03 RX ADMIN — METHOCARBAMOL 500 MG: 500 TABLET ORAL at 16:51

## 2024-05-03 RX ADMIN — ALLOPURINOL 100 MG: 100 TABLET ORAL at 08:01

## 2024-05-03 RX ADMIN — FLUTICASONE FUROATE AND VILANTEROL TRIFENATATE 1 PUFF: 100; 25 POWDER RESPIRATORY (INHALATION) at 08:01

## 2024-05-03 RX ADMIN — ONDANSETRON 4 MG: 2 INJECTION INTRAMUSCULAR; INTRAVENOUS at 03:37

## 2024-05-03 RX ADMIN — ACETAMINOPHEN 975 MG: 325 TABLET, FILM COATED ORAL at 20:32

## 2024-05-03 RX ADMIN — ACETAMINOPHEN 975 MG: 325 TABLET, FILM COATED ORAL at 03:48

## 2024-05-03 RX ADMIN — LIDOCAINE 4% 2 PATCH: 40 PATCH TOPICAL at 20:32

## 2024-05-03 RX ADMIN — ONDANSETRON 4 MG: 4 TABLET, ORALLY DISINTEGRATING ORAL at 10:58

## 2024-05-03 RX ADMIN — OMEPRAZOLE 40 MG: 20 CAPSULE, DELAYED RELEASE ORAL at 08:01

## 2024-05-03 RX ADMIN — CLOPIDOGREL BISULFATE 75 MG: 75 TABLET ORAL at 13:13

## 2024-05-03 RX ADMIN — BUDESONIDE 0.5 MG: 0.5 INHALANT RESPIRATORY (INHALATION) at 20:25

## 2024-05-03 ASSESSMENT — ACTIVITIES OF DAILY LIVING (ADL)
ADLS_ACUITY_SCORE: 32
ADLS_ACUITY_SCORE: 31
ADLS_ACUITY_SCORE: 32
ADLS_ACUITY_SCORE: 31
ADLS_ACUITY_SCORE: 32
ADLS_ACUITY_SCORE: 31
ADLS_ACUITY_SCORE: 32
ADLS_ACUITY_SCORE: 31
ADLS_ACUITY_SCORE: 32
ADLS_ACUITY_SCORE: 31
ADLS_ACUITY_SCORE: 31
ADLS_ACUITY_SCORE: 32
ADLS_ACUITY_SCORE: 32
ADLS_ACUITY_SCORE: 31
ADLS_ACUITY_SCORE: 32

## 2024-05-03 NOTE — PROGRESS NOTES
"CLINICAL NUTRITION SERVICES - ASSESSMENT NOTE     Nutrition Prescription    RECOMMENDATIONS FOR MDs/PROVIDERS TO ORDER:  None at present.    Malnutrition Status:    Moderate malnutrition in the context of acute illness.    Recommendations already ordered by Registered Dietitian (RD):  Provided \"no reds\" menu  Ordered chocolate Ensure Enlive TID with all meals and supplements PRN    Future/Additional Recommendations:  Monitor oral intake, weight, supplement preferences.     REASON FOR ASSESSMENT  Tessa Mansfield is a/an 89 year old female assessed by the dietitian for Admission Nutrition Risk Screen for positive - 2-13 lb wt loss, decreased appetite    CLINICAL HISTORY  Chart reviewed.   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 She is hemodynamically stable with stable hemoglobin, and well appearing. She is being admitted with GI consult for EGD.     NUTRITION HISTORY  Pt assessed by RD on 4/19 during previous [facility] admission. She had no appetite, which was unusual for her. Butter pecan Magic Cup was ordered.    Met with pt. She reports that she's not really hungry. She's had decreased appetite for the last 3-4 weeks. She knows she's eating less than normal, unable to quantify how much. She used to eat 3 meals a day before this. She disliked the " "Magic Cup she had during her last admission. She's had Ensure before and is open to having that here. Her doctors have told her to avoid eating red foods with her possible GI bleed.    CURRENT NUTRITION ORDERS  Diet: Regular  Intake/Tolerance: Pt consuming % of meals per flowsheet.    LABS  Labs reviewed  5/2 BUN 33 (H). Cr 1.59 (H). GFR 31 (L).    MEDICATIONS  Medications reviewed  Omeprazole  Zofran 1x  PRN zofran senna    ANTHROPOMETRICS  Height: 167.6 cm (5' 6\")  Most Recent Weight: 65.3 kg (143 lb 14.4 oz)    IBW: 59.1 kg (110% IBW)  BMI: 23.23 kg/m2 - Normal BMI  Weight History: 9.2% wt loss in 2 weeks.  Wt Readings from Last 25 Encounters:   05/03/24 65.3 kg (143 lb 14.4 oz)   04/19/24 71.9 kg (158 lb 8.2 oz)   04/11/24 71 kg (156 lb 8 oz)   04/03/24 69.4 kg (153 lb)   03/18/24 71.1 kg (156 lb 12.8 oz)   02/20/24 70.3 kg (155 lb)   02/19/24 70.3 kg (155 lb)   01/02/24 73.4 kg (161 lb 12.8 oz)   11/22/23 71.4 kg (157 lb 6.4 oz)   11/20/23 72.9 kg (160 lb 11.2 oz)   11/15/23 72.8 kg (160 lb 9.6 oz)   11/06/23 72.1 kg (158 lb 15.2 oz)   10/09/23 72 kg (158 lb 12.8 oz)   10/02/23 71.7 kg (158 lb)   08/28/23 75.8 kg (167 lb 3.2 oz)   08/09/23 75.1 kg (165 lb 8 oz)   07/31/23 76.2 kg (167 lb 14.4 oz)   07/22/23 80 kg (176 lb 5.9 oz)   07/13/23 73.5 kg (162 lb)   06/28/23 75.1 kg (165 lb 8 oz)   06/15/23 75.5 kg (166 lb 8 oz)   05/01/23 72.6 kg (160 lb)     Dosing Weight: 65.3 kg (actual BW)    ASSESSED NUTRITION NEEDS  Estimated Energy Needs: 1632 - 1959 kcals/day (25 - 30 kcals/kg)  Justification: Maintenance  Estimated Protein Needs: 78 - 98 grams protein/day (1.2 - 1.5 grams of pro/kg)  Justification: Increased needs  Estimated Fluid Needs: 1 mL/kcal  Justification: Maintenance    PHYSICAL FINDINGS  See malnutrition section below.    MALNUTRITION  % Intake: Decreased intake does not meet criteria - difficult to quantify PTA intake  % Weight Loss: > 2% in 1 week (severe)  Subcutaneous Fat Loss: Facial " "region:  mild  Muscle Loss: Temporal:  mild, Upper arm (bicep, tricep):  mild, and Dorsal hand:  moderate  Fluid Accumulation/Edema: None noted  Malnutrition Diagnosis: Moderate malnutrition in the context of acute illness.    NUTRITION DIAGNOSIS  Inadequate oral intake related to decreased appetite as evidenced by pt report, 9.2% wt loss in 2 weeks.      INTERVENTIONS  Implementation  Nutrition Education: RD role in care   Medical food supplement therapy     Goals  Patient to consume % of nutritionally adequate meal trays TID, or the equivalent with supplements/snacks.     Monitoring/Evaluation  Progress toward goals will be monitored and evaluated per protocol.  Levy Frazier, RD, LD  Available on [employer]  Weekend/Holiday RD Vocera - \"Weekend Clinical Dietitian\"  No longer available by paging   "

## 2024-05-03 NOTE — PHARMACY-ADMISSION MEDICATION HISTORY
Pharmacy Intern Admission Medication History    Admission medication history is complete. The information provided in this note is only as accurate as the sources available at the time of the update.    Information Source(s): [facility]    Pertinent Information:   Allergies: On patient's chart, allergies were listed for ciprofloxacin (rash) and hydromorphone (visual disturbance/hallucinations). Per records, patient received both of these within the last couple months. Upon discussion with her, the patient reported tolerating the ciprofloxacin with no adverse effects, and she approved removal of this medication from the allergies list. She also reported tolerating the hydromorphone during her last hospital admission from 4/13-4/22/24. This was added onto the allergy as a comment, but hydromorphone was not removed from allergies list.   Inhaler: The patient has a fluticasone-salmeterol inhaler at home last filled on 12/26/23 for a 30 day supply. Per the patient, she is supposed to be taking this twice daily, but has not been taking it very regularly. She last took this over a month ago.   Hydromorphone and methocarbamol were on her initial medication list, but per the patient, she never received the hydromorphone outside of the hospital and doesn't recall methocarbamol anytime recently. [facility] does not show fills for these medications in the last year. Both removed from med list.    Changes made to PTA medication list:  Added: acetaminophen  Deleted: calcium carbonate  Changed:   Cyanocobalamin: Tablet strength changed from 1000 mcg to 500 mcg. Dose remains 500 mcg daily  Ferrous sulfate: Dosing changed from every other day to 4 times weekly per patient  Furosemide: Changed from 40 mg daily to 40 mg daily scheduled AND an additional 40 mg daily PRN for swelling  Isosorbide mononitrate: Dosing changed from 40 mg TID to 60 mg BID per patient  Psyllium packet: Changed from 1  packet daily to 1 packet daily PRN    Allergies reviewed with patient and updates made in EHR: yes   Ciprofloxacin removed from allergy list with patient approval    Medication History Completed By: Sarah Reinoso 5/3/2024 9:27 AM    PTA Med List   Medication Sig Last Dose    acetaminophen (TYLENOL) 325 MG tablet Take 975 mg by mouth nightly as needed for pain More than a month at PRN    allopurinol (ZYLOPRIM) 100 MG tablet Take 1 tablet (100 mg) by mouth daily 4/30/2024 at am    amLODIPine (NORVASC) 10 MG tablet Take 1 tablet (10 mg) by mouth daily 4/30/2024 at am    apixaban ANTICOAGULANT (ELIQUIS) 2.5 MG tablet Take 1 tablet (2.5 mg) by mouth 2 times daily 4/30/2024 at pm    budesonide (PULMICORT) 0.5 MG/2ML neb solution Take 2 mLs (0.5 mg) by nebulization 2 times daily 4/30/2024 at pm    clopidogrel (PLAVIX) 75 MG tablet Take 1 tablet (75 mg) by mouth daily 4/30/2024 at am    ferrous sulfate (FEROSUL) 325 (65 Fe) MG tablet Take 1 tablet by mouth 4 times weekly on Sunday, Tuesday, Thursday, and Saturday. 4/30/2024 at am    fluticasone-salmeterol (ADVAIR) 250-50 MCG/ACT inhaler INHALE 1 DOSE BY MOUTH TWICE DAILY More than a month    furosemide (LASIX) 40 MG tablet Take 40 mg by mouth daily May take an additional 40 mg at noon as needed for swelling. 4/30/2024 at am    isosorbide mononitrate (ISMO/MONOKET) 20 MG tablet Take 60 mg by mouth 2 times daily 4/30/2024 at pm    metoprolol tartrate (LOPRESSOR) 50 MG tablet Take 1 tablet (50 mg) by mouth 2 times daily 4/30/2024 at pm    Multiple Vitamins-Minerals (PRESERVISION AREDS 2+MULTI VIT PO) Take 1 capsule by mouth 2 times daily 4/30/2024 at pm    omeprazole (PRILOSEC) 40 MG DR capsule Take 1 capsule (40 mg) by mouth daily 4/30/2024 at am    potassium chloride ER (K-TAB/KLOR-CON) 10 MEQ CR tablet Take 1 tablet (10 mEq) by mouth daily 4/30/2024 at am    psyllium (METAMUCIL) 28.3 % packet Take 1 packet by mouth daily as needed for constipation Past Week at PRN     rosuvastatin (CRESTOR) 20 MG tablet Take 1 tablet (20 mg) by mouth daily for 360 days 4/30/2024 at pm    timolol maleate (TIMOPTIC) 0.5 % ophthalmic solution INSTILL 1 DROP INTO EACH EYE ONCE DAILY IN THE MORNING 5/1/2024 at am    vitamin B-12 (CYANOCOBALAMIN) 500 MCG tablet Take 1 tablet by mouth daily 4/30/2024 at pm    vitamin D3 25 mcg (1000 units) tablet Take 1 tablet (25 mcg) by mouth every other day 4/30/2024 at pm

## 2024-05-03 NOTE — PROVIDER NOTIFICATION
Texted provider (MAHESH Sheriff):  Hi again, when I went to give the Tylenol, she was sound asleep, so did not give it. But she just called and is dry heaving. Can we get some IV antiemetics?    Provider ordered IV Zofran one time.

## 2024-05-03 NOTE — PROGRESS NOTES
"Occupational Therapy Evaluation       05/03/24 1500   Appointment Info   Signing Clinician's Name / Credentials (OT) Surekhamilad Zabala OTR/L   Living Environment   People in Home spouse   Current Living Arrangements house   Home Accessibility stairs to enter home;stairs within home   Number of Stairs, Main Entrance 2   Stair Railings, Main Entrance none   Number of Stairs, Within Home, Primary seven   Stair Railings, Within Home, Primary railings safe and in good condition;railings on both sides of stairs   Transportation Anticipated family or friend will provide   Living Environment Comments Pt reported she lives in a split level home, with 2 DAVEY from garage to main floor with kitchen, living room, then 7 stairs up to bedroom and bathroom, and 7 stairs down from main floor to family room and additional bathroom. Pt has both tub and walk in shower, has a shower chair.   Self-Care   Usual Activity Tolerance moderate   Current Activity Tolerance moderate   Equipment Currently Used at Home walker, rolling;walker, standard;shower chair   Fall history within last six months no   Activity/Exercise/Self-Care Comment Pt at baseline is IND with ADLs, since discharge from TCU has been completing ADLs SBA-mod IND w FWW. Spouse is available to assist as needed.   Instrumental Activities of Daily Living (IADL)   IADL Comments Pt spouse has been completing IADLs since previous hospitalization, however at baseline pt is IND but does not drive.   General Information   Onset of Illness/Injury or Date of Surgery 05/01/24   Referring Physician Jeancarlos Velásquez MD   Patient/Family Therapy Goal Statement (OT) to return home   Additional Occupational Profile Info/Pertinent History of Current Problem Per chart, \"Tessa is a 89 y/o female with medical history significant for chronic anemia, CAD with history of multiple stents (most recently 11/6/2023 LAD PCI) on Apixaban and Clopidogrel, sick sinus syndrome status-post dual-chamber PPM, " "paroxysmal atrial fibrillation, mild-moderate mitral regurgitation, moderate tricuspid regurgitation, 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 She is hemodynamically stable with stable hemoglobin, and well appearing. She was admitted for EGD (no findings 5/2). She requires admission to resume her anticoagulation medications while monitoring for melena.\"   Existing Precautions/Restrictions fall;abdominal   Left Upper Extremity (Weight-bearing Status) partial weight-bearing (PWB)  (<10#)   Right Upper Extremity (Weight-bearing Status) partial weight-bearing (PWB)  (<10#)   Left Lower Extremity (Weight-bearing Status) full weight-bearing (FWB)   Right Lower Extremity (Weight-bearing Status) full weight-bearing (FWB)   Cognitive Status Examination   Orientation Status orientation to person, place and time   Affect/Mental Status (Cognitive) WFL   Follows Commands WFL   Cognitive Status Comments Appears at baseline   Visual Perception   Visual Impairment/Limitations corrective lenses full-time   Sensory   Sensory Quick Adds sensation intact   Pain Assessment   Patient Currently in Pain No   Posture   Posture forward head position;protracted shoulders   Range of Motion Comprehensive   General Range of Motion bilateral upper extremity ROM WFL   Strength Comprehensive (MMT)   Comment, General Manual Muscle Testing (MMT) Assessment BUE strength 5/5   Coordination   Upper Extremity Coordination No deficits were identified   Bed Mobility   Bed Mobility supine-sit;sit-supine   Supine-Sit [place] (Bed Mobility) modified independence   Sit-Supine " [place] (Bed Mobility) modified independence   Transfers   Transfers sit-stand transfer   Sit-Stand Transfer   Sit-Stand [place] (Transfers) supervision   Assistive Device (Sit-Stand Transfers) walker, front-wheeled   Balance   Balance Comments BUE support on FWW for dynamic standing balance   Bathing Assessment/Intervention   Don[place] Level (Bathing) minimum assist (75% patient effort)   Comment, (Bathing) Per clinical judgement   Lower Body Dressing Assessment/Training   [place] Level (Lower Body Dressing) supervision   Comment, (Lower Body Dressing) Per clinical judgement   Grooming Assessment/Training   [place] Level (Grooming) supervision   Toileting   Donley Level (Toileting) supervision   Clinical Impression   Criteria for Skilled Therapeutic Interventions Met (OT) Yes, treatment indicated   OT Diagnosis decreased ADL independence   OT Problem List-Impairments impacting ADL problems related to;activity tolerance impaired;balance;strength;post-surgical precautions   Assessment of Occupational Performance 1-3 Performance Deficits   Identified Performance Deficits ADL including bathing, toileting, functional mobility   Planned Therapy Interventions (OT) ADL retraining;transfer training;home program guidelines;progressive activity/exercise   Clinical Decision Making Complexity (OT) problem focused assessment/low complexity   Risk & Benefits of therapy have been explained evaluation/treatment results reviewed;care plan/treatment goals reviewed;risks/benefits reviewed;current/potential barriers reviewed;participants voiced agreement with care plan;participants included;patient   Clinical Impression Comments Pt presents slightly below functional baseline, consistent with function at discharge from TCU, limited primarily by decreaesd activity tolerance and balance impacting ADL independence. Pt would benefit from continued OT to progress ADL toward PLOF and facilitate safe discharge  planning.   OT Total Evaluation Time   OT Eval, Low Complexity Minutes (86519) 10   OT Goals   Therapy Frequency (OT) 5 times/week   OT Predicted Duration/Target Date for Goal Attainment 05/10/24   OT: Hygiene/Grooming modified independent   OT: Lower Body Dressing Modified independent   OT: Toilet Transfer/Toileting Modified independent   Therapeutic Activities   Therapeutic Activity Minutes (08281) 13   Symptoms noted during/after treatment fatigue   Treatment Detail/Skilled Intervention OT: Facilitated functional mobility and transfers for increased IND. Pt sat EoB mod IND, reported no dizziness, /57. Pt stood SBA w FWW, completed in room mobility SBA with FWW. Pt tolerated well, no loss of balance noted. Pt completed STS transfer to simulate toilet transfer with SBA. Discussed with pt DME at home and recommendation to continue using FWW and shower chair for safety. Educated pt on recommendation to call for assist to get to commode for safety, pt in agreement. Pt returned to supine mod IND, left with call light within reach, all needs met.   OT Discharge Planning   OT Plan OT: stairs, shower transfer   OT Discharge Recommendation (DC Rec) home with home care occupational therapy  (and  PT)   OT Rationale for DC Rec Pt presents slightly below functional baseline, consistent with function at discharge from TCU. Recommend discharge home with assist from spouse for IADLs and current DME. Recommend continuing with plan for HH OT and PT.   OT Brief overview of current status SBA w FWW   Total Session Time   Timed Code Treatment Minutes 13   Total Session Time (sum of timed and untimed services) 23

## 2024-05-03 NOTE — PLAN OF CARE
[id number]:  NEURO: A&Ox4.  RESPIRATORY: Lungs clear. O2 sats 100% on RA.  VS: Afebrile. HR 70s-80s. BPs 140s-150s/48-60s.  GI/: Latest BM brown, not black. Small, loose/watery. Adequate urine output.  SKIN: Abdominal midline incision with staples, CDI. Pt states the plan is to remove staples in clinic on Wed 5/8, so depending on discharge date, possibly staples can be removed here.  ACTIVITY: Up with assist of 1 and walker.  PAIN: Denies pain.  LINES: R PIV SL'd.  PLAN: Monitor and document stool output color, texture, volume. Continue plan of care. Notify team with changes/concerns.

## 2024-05-03 NOTE — PROGRESS NOTES
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 hypertensive nephrosclerosis, hypertension, chronic cough, history of bleeding colonic angiodysplastic lesion in the ascending colon treated with clips (MR conditional) and many polyps in the ascending and transverse colon not removed in the setting of GI bleed 7/21/2023, diverticulosis in the entire examined colon, recent admission 4/13-4/22/2024 for small bowel obstruction status-post exploratory laparotomy x3 with small bowel torsion around adhesions status-post adhesiolysis and detorsion with abdominal closure who presented to the ED on 5/1/2024 with reported melena on Clopidogrel (Plavix) and Apixaban (Eliquis), for which the GI Luminal Service was consulted.       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 Hemoglobin baseline of 8, now 7.1.   Status-post EGD 5/2/2024 with no obvious abnormality to explain the patient's black stools. Nodular area in body of stomach was biopsied and returned as nodular reactive gastropathy on path.  Highest on the differential remains oozing AVM in the setting of DOAC and antiplatelet therapy with history of cardiac stents, chronic cardiac disease, chronic kidney disease, most likely AVM(s) in the small bowel versus potentially  "AVMs in the right colon. If ongoing evidence of overt GI bleeding with worsening anemia concerning for ongoing blood loss, will need to consider repeat colonoscopy.    RECOMMENDATIONS:  - continue omeprazole 40mg daily   - Okay to resume antiplatelet and anticoagulation therapy and monitor   - Consider discussion with cardiology regarding what her transfusion threshold should be.   - monitor stool output and Hgb  - If ongoing drop in Hgb, will consider inpatient colonoscopy for further evaluation.     The patient was discussed and plan agreed upon with GI staff, Dr. Anika Prieto MD  Gastroenterology Fellow    _______________________________________________________________      Subjective: Nursing notes and 24hr events reviewed. Patient feeling okay but has some incisional pain in abdomen. No bowel movement yet this morning.     ROS:   4 pt ROS negative unless noted in subjective.     Objective:  Blood pressure (!) 141/56, pulse 72, temperature 97  F (36.1  C), temperature source Oral, resp. rate 16, height 1.676 m (5' 6\"), weight 65.3 kg (143 lb 14.4 oz), SpO2 100%, not currently breastfeeding.     Constitutional: cooperative, pleasant, not dyspneic/diaphoretic, no acute distress  Eyes: Sclera anicteric/injected  Ears/nose/mouth/throat: Normal oropharynx without ulcers or exudate, mucus membranes moist, hearing intact  CV: No edema  Respiratory: Unlabored breathing  Abd: Nondistended, nontender, no peritoneal signs  Skin: warm, perfused, no jaundice  Neuro: AAO x 3,  Psych: Normal affect  MSK: No gross deformities          LABS:  BMP  Recent Labs   Lab 05/03/24  0500 05/02/24  2159 05/02/24  0449 05/01/24  1101   NA  --   --  145 142   POTASSIUM 3.6 4.0 3.3* 3.7   CHLORIDE  --   --  112* 105   ALEXANDRO  --   --  8.4* 8.8   CO2  --   --  19* 20*   BUN  --   --  33.0* 43.3*   CR  --   --  1.59* 1.77*   GLC  --   --  91 93     CBC  Recent Labs   Lab 05/03/24  0500 05/02/24  0614 05/01/24  1828 05/01/24  1101 "   WBC 2.8* 5.7 5.4 5.5   RBC 2.54* 2.85* 2.99* 2.98*   HGB 7.1* 7.7* 8.2* 8.1*   HCT 22.8* 25.4* 26.2* 26.1*   MCV 90 89 88 88   MCH 28.0 27.0 27.4 27.2   MCHC 31.1* 30.3* 31.3* 31.0*   RDW 19.2* 19.3* 19.4* 19.1*     --  172 189     INR  Recent Labs   Lab 05/02/24  0449 05/01/24  1828 05/01/24  1101   INR 1.30* 1.33* 1.41*     LFTs  Recent Labs   Lab 05/01/24  1101   ALKPHOS 72   AST 42   ALT 23   BILITOTAL 0.5   PROTTOTAL 6.7   ALBUMIN 3.6      PANCNo lab results found in last 7 days.

## 2024-05-03 NOTE — PROGRESS NOTES
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 She is hemodynamically stable with stable hemoglobin, and well appearing. She was admitted for EGD (no findings 5/2). She requires admission to resume her anticoagulation medications while monitoring for melena.        Changes today:   - Resume clopidogrel, monitor stool output closely   - Resume amlodipine   - Zofran for nausea   - Touch base with surgery regarding when stables will be removed   - PT/OT consult       Melana with Azotemia   Anemia, chronic   History of bleeding colonic angiodysplastic lesion in the ascending colon s/p clips 7/2023  Colonic polyps  Diverticulosis   Presented with one day of melana. Admission Hgb 8.1, near baseline 8-10. Normal vitals. Not tachycardic however on beta blocker pta. CT abdomen/pelvis without acute hemorrhage/fluid collection.  EGD 5/01 \"There was patchy nodular mucosa in body of stomach which had mild oozing with irritation. Unlikely to be the cause of melana. Highest on the differential is most likely oozing AVM in the setting of DOAC and antiplatelet therapy.   - GI " "consulted  - Monitor stool output closely: if ongoing bleeding, will consider colonoscopy  - s/p IV PPI 40mg in the ID, continue PPI 40mg BID   - resume clopidogrel, hold apixaban   - 2 large bore Ivs at all times   - Monitor hgb, transfuse if less than 7 or hemodynamically unstable   - Avoid NSAIDs  - PT/OT consult       Nausea  Some today. No new abdominal pain.   - PRN zofran        Recent admission (4/2023) for small bowel obstruction with possible ischemia  Evolving postsurgical edema and/or fat necrosis  She was admitted 4/13 to 4/22. She underwent laporoscopy on 4/13 c/b bleeding with return to the OR 4/13/24. Abdominal closure completed 4/14.  CT abdomen/pelvis here notable for \"Postsurgical changes of the abdomen with ventral abdominal wall incision. Immediately deep to the incision and rectus abdominis musculature, there is stranding associated with lobules of fat, likely evolving postsurgical edema and/or fat necrosis. Soft tissue  thickening in the transverse mesocolon and small amount of blood products in the right central mesentery, also likely evolving surgical change\". She is not having new abdominal pain. Has previous pain that is present at the superior portion of her abdominal incision.   - Surgery consulted given concern for fat necrosis - nothing to do they will remove the stables         -----------------chronic-----------------------------  CAD s/p multiple stents (most recent 11/2023)  Sick sinus syndrom s/p dual PPM   Paroxysmal atrial fibrillation  Mild-moderate mitral regurgitation   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  - resume pta amlodipine   - holding pta furosemide   - hold isorbide mononitrate   - hold metoprolol   - hold potassium chloride         ?gout  - pta allopurinol         HLD  - pta rosuvastatin        Diet: Regular Diet Adult    DVT Prophylaxis: none, brady   Bonner Catheter: Not present  Fluids: none   Lines: None     Cardiac Monitoring: None  Code Status: Full Code  "     Clinically Significant Risk Factors        # Hypokalemia: Lowest K = 3.3 mmol/L in last 2 days, will replace as needed   # Hypocalcemia: Lowest Ca = 8.4 mg/dL in last 2 days, will monitor and replace as appropriate        # Coagulation Defect: INR = 1.30 (Ref range: 0.85 - 1.15) and/or PTT = 40 Seconds (Ref range: 22 - 38 Seconds), will monitor for bleeding    # Hypertension: Noted on problem list  # Chronic heart failure with preserved ejection fraction: heart failure noted on problem list and last echo with EF >50%           # Financial/Environmental Concerns: none   # Pacemaker present       Disposition Plan     Expected Discharge Date: 05/03/2024      Destination: home with family;home with help/services          The patient's care was discussed with the Attending Physician, Dr. Velásquez .    Sabrina Torres MD  Medicine Service, [facility] TEAM [address] [place] [address]  Securely message with [employer] (more info)  Text page via [facility] Paging/Directory   See signed in provider for up to date coverage information  ______________________________________________________________________    Interval History   EGD yesterday. Has not had a bowel movement since yesterday. No new abdominal pain. Some nausea, improved with zofran. No CP/SOB. Has been able to walk to the bathroom without issues. Wondering when her abdominal staples will be removed.     Physical Exam   Vital Signs: Temp: 98.3  F (36.8  C) Temp src: Oral BP: (!) 148/66 Pulse: 66   Resp: 16 SpO2: 100 % O2 Device: None (Room air) Oxygen Delivery: 2 LPM  Weight: 143 lbs 14.4 oz    General: Well appearing, nontoxic  HEENT: MMM  CV: RRR, no murmurs heard, extremities perfused, no lower extremity edema   Resp: breathing comfortably on room air, CTAB  Abd: soft, nondistended, mild tenderness around the abdominal incision staples (stable). No redness around the abdominal incision. No swelling.   Neuro: moving all extremities, upper  and lower extremity 5/5    Medical Decision Making       Please see A&P for additional details of medical decision making.      Data   ------------------------- PAST 24 HR DATA REVIEWED -----------------------------------------------

## 2024-05-03 NOTE — PLAN OF CARE
"BP (!) 141/51 (BP Location: Right arm)   Pulse 73   Temp 98  F (36.7  C) (Oral)   Resp 20   Ht 1.676 m (5' 6\")   Wt 65 kg (143 lb 3.2 oz)   SpO2 99%   BMI 23.11 kg/m      Pt remains hypertensive, -150s/50-60s. OVSS on RA. Pt feeling well until midnight and was unable to fall back asleep. She started dry-heaving and feeling nauseated, Zofran given x1. Tylenol given x1 for increased abdominal pressure and incision pain. Incision CDI and looks good. Pt still nauseated and in pain after interventions, but was able to fall back asleep. No BM since 1800 yesterday. Pt ate potato soup and vanilla pudding yesterday. She denies SOB, dizziness. Assist x1 with GB and walker, a little weak but steady on her feet. Evening K+ recheck 4.0, no replacement needed with AM labs. Hgb 7.1, provider notified. Pt currently sleeping. Continue POC.    Problem: Adult Inpatient Plan of Care  Goal: Plan of Care Review  Description: The Plan of Care Review/Shift note should be completed every shift.  The Outcome Evaluation is a brief statement about your assessment that the patient is improving, declining, or no change.  This information will be displayed automatically on your shift  note.  Outcome: Not Progressing  Goal: Optimal Comfort and Wellbeing  Outcome: Not Progressing  Intervention: Monitor Pain and Promote Comfort  Recent Flowsheet Documentation  Taken 5/3/2024 0539 by Melva Haywood RN  Pain Management Interventions:   Provider notified (comment)   pillow support provided   quiet environment facilitated   rest  Taken 5/3/2024 0348 by Melva Haywood RN  Pain Management Interventions:   medication (see MAR)   emotional support   pillow support provided   repositioned   quiet environment facilitated   rest  Taken 5/3/2024 0221 by Melva Haywood RN  Pain Management Interventions:   Provider notified (comment)   emotional support   repositioned   quiet environment facilitated   rest   pillow support provided  Goal: " Readiness for Transition of Care  Outcome: Not Progressing     Problem: Adult Inpatient Plan of Care  Goal: Absence of Hospital-Acquired Illness or Injury  Outcome: Progressing  Intervention: Identify and Manage Fall Risk  Recent Flowsheet Documentation  Taken 5/3/2024 0400 by Melva Haywood RN  Safety Promotion/Fall Prevention:   activity supervised   assistive device/personal items within reach   clutter free environment maintained   mobility aid in reach   nonskid shoes/slippers when out of bed   patient and family education   room organization consistent   safety round/check completed  Taken 5/3/2024 0000 by Melva Haywood RN  Safety Promotion/Fall Prevention:   activity supervised   assistive device/personal items within reach   clutter free environment maintained   mobility aid in reach   nonskid shoes/slippers when out of bed   patient and family education   room organization consistent   safety round/check completed  Taken 5/2/2024 2000 by Melva Haywood RN  Safety Promotion/Fall Prevention:   activity supervised   assistive device/personal items within reach   clutter free environment maintained   mobility aid in reach   nonskid shoes/slippers when out of bed   patient and family education   room organization consistent   safety round/check completed  Intervention: Prevent Skin Injury  Recent Flowsheet Documentation  Taken 5/3/2024 0000 by Melva Haywood RN  Body Position: position changed independently  Taken 5/2/2024 2000 by Melva Haywood RN  Body Position: position changed independently  Intervention: Prevent Infection  Recent Flowsheet Documentation  Taken 5/3/2024 0400 by Melva Haywood RN  Infection Prevention:   environmental surveillance performed   hand hygiene promoted   personal protective equipment utilized   rest/sleep promoted   single patient room provided  Taken 5/3/2024 0000 by Melva Haywood RN  Infection Prevention:   environmental surveillance performed   hand hygiene  promoted   personal protective equipment utilized   rest/sleep promoted   single patient room provided  Taken 5/2/2024 2000 by Melva Haywood RN  Infection Prevention:   environmental surveillance performed   equipment surfaces disinfected   hand hygiene promoted   personal protective equipment utilized   rest/sleep promoted   single patient room provided   visitors restricted/screened     Problem: Gastrointestinal Bleeding  Goal: Optimal Coping with Acute Illness  Outcome: Progressing  Intervention: Optimize Psychosocial Response  Recent Flowsheet Documentation  Taken 5/2/2024 2000 by Melva Haywood RN  Supportive Measures:   active listening utilized   positive reinforcement provided   self-care encouraged   verbalization of feelings encouraged     Problem: Fall Injury Risk  Goal: Absence of Fall and Fall-Related Injury  Outcome: Progressing  Intervention: Identify and Manage Contributors  Recent Flowsheet Documentation  Taken 5/3/2024 0400 by Melva Haywood RN  Medication Review/Management: medications reviewed  Taken 5/3/2024 0000 by Melva Haywood RN  Medication Review/Management: medications reviewed  Taken 5/2/2024 2000 by Melva Haywood RN  Self-Care Promotion: independence encouraged  Medication Review/Management: medications reviewed  Intervention: Promote Injury-Free Environment  Recent Flowsheet Documentation  Taken 5/3/2024 0400 by Melva Haywood RN  Safety Promotion/Fall Prevention:   activity supervised   assistive device/personal items within reach   clutter free environment maintained   mobility aid in reach   nonskid shoes/slippers when out of bed   patient and family education   room organization consistent   safety round/check completed  Taken 5/3/2024 0000 by Melva Haywood RN  Safety Promotion/Fall Prevention:   activity supervised   assistive device/personal items within reach   clutter free environment maintained   mobility aid in reach   nonskid shoes/slippers when out of bed    patient and family education   room organization consistent   safety round/check completed  Taken 5/2/2024 2000 by Melva Haywood, RN  Safety Promotion/Fall Prevention:   activity supervised   assistive device/personal items within reach   clutter free environment maintained   mobility aid in reach   nonskid shoes/slippers when out of bed   patient and family education   room organization consistent   safety round/check completed

## 2024-05-03 NOTE — PROVIDER NOTIFICATION
Texted and paged provider (MAHESH Sheriff):  Hi there, pt had melatonin at 2000, but has been unable to go back to sleep since midnight. She is also having 2-3/10 incision pain. She is requesting something to help her sleep. Any suggestions?    Provider ordered Tylenol.

## 2024-05-03 NOTE — PLAN OF CARE
"Assumed care [id number]  NEURO: A&O x4  CARDIAC: VSS, afebrile   RESPIRATORY: Satting >92% on RA, denies SOB   GI/: Adequate urine output in commode, no BM this shift.   DIET/APPETITE: Tolerating regular diet, eating well   ACTIVITY: Assist of 1 OOB & to commode   PAIN: Denies pain  SKIN: Abdominal incision with staples CDI  Lines / Drains / Airway: R PIV x1    PLAN: Continue with plan of care, notify primary care team with changes.    Problem: Adult Inpatient Plan of Care  Goal: Plan of Care Review  Description: The Plan of Care Review/Shift note should be completed every shift.  The Outcome Evaluation is a brief statement about your assessment that the patient is improving, declining, or no change.  This information will be displayed automatically on your shift  note.  Outcome: Progressing  Goal: Patient-Specific Goal (Individualized)  Description: You can add care plan individualizations to a care plan. Examples of Individualization might be:  \"Parent requests to be called daily at 9am for status\", \"I have a hard time hearing out of my right ear\", or \"Do not touch me to wake me up as it startles  me\".  Outcome: Progressing  Goal: Absence of Hospital-Acquired Illness or Injury  Outcome: Progressing  Intervention: Identify and Manage Fall Risk  Recent Flowsheet Documentation  Taken 5/3/2024 1200 by Roxana Wells, RN  Safety Promotion/Fall Prevention:   activity supervised   assistive device/personal items within reach   clutter free environment maintained   mobility aid in reach   nonskid shoes/slippers when out of bed   patient and family education   room organization consistent   safety round/check completed  Taken 5/3/2024 0900 by Roxana Wells, RN  Safety Promotion/Fall Prevention: safety round/check completed  Taken 5/3/2024 0800 by Roxana Wells, RN  Safety Promotion/Fall Prevention:   activity supervised   assistive device/personal items within reach   clutter free environment maintained   " mobility aid in reach   nonskid shoes/slippers when out of bed   patient and family education   room organization consistent   safety round/check completed  Intervention: Prevent Skin Injury  Recent Flowsheet Documentation  Taken 5/3/2024 1200 by Roxana Wells RN  Body Position: position changed independently  Taken 5/3/2024 0800 by Roxana Wells RN  Body Position: position changed independently  Intervention: Prevent and Manage VTE (Venous Thromboembolism) Risk  Recent Flowsheet Documentation  Taken 5/3/2024 1200 by Roxana Wells RN  VTE Prevention/Management: SCDs (sequential compression devices) off  Taken 5/3/2024 0800 by Roxana Wells RN  VTE Prevention/Management: SCDs (sequential compression devices) off  Intervention: Prevent Infection  Recent Flowsheet Documentation  Taken 5/3/2024 1200 by Roxana Wells RN  Infection Prevention:   environmental surveillance performed   hand hygiene promoted   personal protective equipment utilized   rest/sleep promoted   single patient room provided  Taken 5/3/2024 0800 by Roxana Wells RN  Infection Prevention:   environmental surveillance performed   hand hygiene promoted   personal protective equipment utilized   rest/sleep promoted   single patient room provided  Goal: Optimal Comfort and Wellbeing  Outcome: Progressing  Intervention: Monitor Pain and Promote Comfort  Recent Flowsheet Documentation  Taken 5/3/2024 1200 by Roxana Wells RN  Pain Management Interventions: medication (see MAR)  Goal: Readiness for Transition of Care  Outcome: Progressing     Problem: Gastrointestinal Bleeding  Goal: Optimal Coping with Acute Illness  Outcome: Progressing  Intervention: Optimize Psychosocial Response  Recent Flowsheet Documentation  Taken 5/3/2024 1200 by Roxana Wells RN  Supportive Measures:   active listening utilized   positive reinforcement provided   self-care encouraged   verbalization of feelings encouraged  Taken  5/3/2024 0800 by Roxana Wells RN  Supportive Measures:   active listening utilized   positive reinforcement provided   self-care encouraged   verbalization of feelings encouraged  Goal: Hemostasis  Outcome: Progressing  Intervention: Manage Gastrointestinal Bleeding  Recent Flowsheet Documentation  Taken 5/3/2024 1200 by Roxana Wells RN  Environmental Support:   calm environment promoted   distractions minimized   environmental consistency promoted   personal routine supported  Taken 5/3/2024 0800 by Roxana Wells RN  Environmental Support:   calm environment promoted   distractions minimized   environmental consistency promoted   personal routine supported     Problem: Fall Injury Risk  Goal: Absence of Fall and Fall-Related Injury  Outcome: Progressing  Intervention: Identify and Manage Contributors  Recent Flowsheet Documentation  Taken 5/3/2024 1200 by Roxana Wells RN  Medication Review/Management: medications reviewed  Taken 5/3/2024 0800 by Roxana Wells RN  Medication Review/Management: medications reviewed  Intervention: Promote Injury-Free Environment  Recent Flowsheet Documentation  Taken 5/3/2024 1200 by Roxana Wells RN  Safety Promotion/Fall Prevention:   activity supervised   assistive device/personal items within reach   clutter free environment maintained   mobility aid in reach   nonskid shoes/slippers when out of bed   patient and family education   room organization consistent   safety round/check completed  Taken 5/3/2024 0900 by Roxana Wells RN  Safety Promotion/Fall Prevention: safety round/check completed  Taken 5/3/2024 0800 by Roxana Wells RN  Safety Promotion/Fall Prevention:   activity supervised   assistive device/personal items within reach   clutter free environment maintained   mobility aid in reach   nonskid shoes/slippers when out of bed   patient and family education   room organization consistent   safety round/check completed    Goal Outcome Evaluation:

## 2024-05-03 NOTE — PROVIDER NOTIFICATION
Texted provider (MAHESH Sheriff):  Hi again, I just checked on her and she says her nausea and pain have had no change since Zofran and Tylenol, which I did end up giving. She is feeling nauseated again, no emesis. Hgb 7.1.    Provider asked if she was feeling constipated, pt was sound asleep when checked on again 30 minutes later.

## 2024-05-04 ENCOUNTER — APPOINTMENT (OUTPATIENT)
Dept: OCCUPATIONAL THERAPY | Facility: CLINIC | Age: 89
DRG: 378 | End: 2024-05-04
Payer: COMMERCIAL

## 2024-05-04 LAB
ALBUMIN SERPL BCG-MCNC: 3.3 G/DL (ref 3.5–5.2)
ALP SERPL-CCNC: 58 U/L (ref 40–150)
ALT SERPL W P-5'-P-CCNC: 15 U/L (ref 0–50)
ANION GAP SERPL CALCULATED.3IONS-SCNC: 10 MMOL/L (ref 7–15)
AST SERPL W P-5'-P-CCNC: 33 U/L (ref 0–45)
BASOPHILS # BLD AUTO: ABNORMAL 10*3/UL
BASOPHILS # BLD MANUAL: 0 10E3/UL (ref 0–0.2)
BASOPHILS NFR BLD AUTO: ABNORMAL %
BASOPHILS NFR BLD MANUAL: 0 %
BILIRUB SERPL-MCNC: 0.7 MG/DL
BUN SERPL-MCNC: 17.6 MG/DL (ref 8–23)
CALCIUM SERPL-MCNC: 8.6 MG/DL (ref 8.8–10.2)
CHLORIDE SERPL-SCNC: 112 MMOL/L (ref 98–107)
CREAT SERPL-MCNC: 1.45 MG/DL (ref 0.51–0.95)
DEPRECATED HCO3 PLAS-SCNC: 21 MMOL/L (ref 22–29)
EGFRCR SERPLBLD CKD-EPI 2021: 35 ML/MIN/1.73M2
EOSINOPHIL # BLD AUTO: ABNORMAL 10*3/UL
EOSINOPHIL # BLD MANUAL: 0 10E3/UL (ref 0–0.7)
EOSINOPHIL NFR BLD AUTO: ABNORMAL %
EOSINOPHIL NFR BLD MANUAL: 0 %
ERYTHROCYTE [DISTWIDTH] IN BLOOD BY AUTOMATED COUNT: 18.5 % (ref 10–15)
GLUCOSE SERPL-MCNC: 96 MG/DL (ref 70–99)
HCT VFR BLD AUTO: 25.6 % (ref 35–47)
HGB BLD-MCNC: 8 G/DL (ref 11.7–15.7)
IMM GRANULOCYTES # BLD: ABNORMAL 10*3/UL
IMM GRANULOCYTES NFR BLD: ABNORMAL %
LYMPHOCYTES # BLD AUTO: ABNORMAL 10*3/UL
LYMPHOCYTES # BLD MANUAL: 1.4 10E3/UL (ref 0.8–5.3)
LYMPHOCYTES NFR BLD AUTO: ABNORMAL %
LYMPHOCYTES NFR BLD MANUAL: 24 %
MAGNESIUM SERPL-MCNC: 1.8 MG/DL (ref 1.7–2.3)
MCH RBC QN AUTO: 27.4 PG (ref 26.5–33)
MCHC RBC AUTO-ENTMCNC: 31.3 G/DL (ref 31.5–36.5)
MCV RBC AUTO: 88 FL (ref 78–100)
METAMYELOCYTES # BLD MANUAL: 0.1 10E3/UL
METAMYELOCYTES NFR BLD MANUAL: 1 %
MONOCYTES # BLD AUTO: ABNORMAL 10*3/UL
MONOCYTES # BLD MANUAL: 0.9 10E3/UL (ref 0–1.3)
MONOCYTES NFR BLD AUTO: ABNORMAL %
MONOCYTES NFR BLD MANUAL: 15 %
NEUTROPHILS # BLD AUTO: ABNORMAL 10*3/UL
NEUTROPHILS # BLD MANUAL: 3.4 10E3/UL (ref 1.6–8.3)
NEUTROPHILS NFR BLD AUTO: ABNORMAL %
NEUTROPHILS NFR BLD MANUAL: 57 %
NRBC # BLD AUTO: 0 10E3/UL
NRBC # BLD AUTO: 0.1 10E3/UL
NRBC BLD AUTO-RTO: 0 /100
NRBC BLD MANUAL-RTO: 1 %
PHOSPHATE SERPL-MCNC: 2 MG/DL (ref 2.5–4.5)
PLAT MORPH BLD: ABNORMAL
PLATELET # BLD AUTO: 142 10E3/UL (ref 150–450)
POTASSIUM SERPL-SCNC: 3.3 MMOL/L (ref 3.4–5.3)
POTASSIUM SERPL-SCNC: 3.7 MMOL/L (ref 3.4–5.3)
PROMYELOCYTES # BLD MANUAL: 0.2 10E3/UL
PROMYELOCYTES NFR BLD MANUAL: 3 %
PROT SERPL-MCNC: 5.9 G/DL (ref 6.4–8.3)
RBC # BLD AUTO: 2.92 10E6/UL (ref 3.8–5.2)
RBC MORPH BLD: ABNORMAL
SODIUM SERPL-SCNC: 143 MMOL/L (ref 135–145)
WBC # BLD AUTO: 6 10E3/UL (ref 4–11)

## 2024-05-04 PROCEDURE — 84295 ASSAY OF SERUM SODIUM: CPT | Performed by: INTERNAL MEDICINE

## 2024-05-04 PROCEDURE — 36415 COLL VENOUS BLD VENIPUNCTURE: CPT | Performed by: INTERNAL MEDICINE

## 2024-05-04 PROCEDURE — 250N000013 HC RX MED GY IP 250 OP 250 PS 637: Performed by: INTERNAL MEDICINE

## 2024-05-04 PROCEDURE — 85027 COMPLETE CBC AUTOMATED: CPT | Performed by: INTERNAL MEDICINE

## 2024-05-04 PROCEDURE — 84100 ASSAY OF PHOSPHORUS: CPT | Performed by: INTERNAL MEDICINE

## 2024-05-04 PROCEDURE — 250N000013 HC RX MED GY IP 250 OP 250 PS 637

## 2024-05-04 PROCEDURE — 85007 BL SMEAR W/DIFF WBC COUNT: CPT | Performed by: INTERNAL MEDICINE

## 2024-05-04 PROCEDURE — 120N000005 HC R&B MS OVERFLOW UMMC

## 2024-05-04 PROCEDURE — 97530 THERAPEUTIC ACTIVITIES: CPT | Mod: GO

## 2024-05-04 PROCEDURE — 94640 AIRWAY INHALATION TREATMENT: CPT | Mod: 76

## 2024-05-04 PROCEDURE — 36415 COLL VENOUS BLD VENIPUNCTURE: CPT

## 2024-05-04 PROCEDURE — 83735 ASSAY OF MAGNESIUM: CPT | Performed by: INTERNAL MEDICINE

## 2024-05-04 PROCEDURE — 99232 SBSQ HOSP IP/OBS MODERATE 35: CPT | Performed by: INTERNAL MEDICINE

## 2024-05-04 PROCEDURE — 84132 ASSAY OF SERUM POTASSIUM: CPT

## 2024-05-04 PROCEDURE — 999N000147 HC STATISTIC PT IP EVAL DEFER

## 2024-05-04 PROCEDURE — 94640 AIRWAY INHALATION TREATMENT: CPT

## 2024-05-04 PROCEDURE — 250N000009 HC RX 250: Performed by: INTERNAL MEDICINE

## 2024-05-04 RX ORDER — POTASSIUM CHLORIDE 750 MG/1
20 TABLET, EXTENDED RELEASE ORAL ONCE
Status: COMPLETED | OUTPATIENT
Start: 2024-05-04 | End: 2024-05-04

## 2024-05-04 RX ORDER — POTASSIUM CHLORIDE 29.8 MG/ML
20 INJECTION INTRAVENOUS ONCE
Qty: 50 ML | Refills: 0 | Status: DISCONTINUED | OUTPATIENT
Start: 2024-05-04 | End: 2024-05-04 | Stop reason: DRUGHIGH

## 2024-05-04 RX ORDER — METOPROLOL TARTRATE 25 MG/1
50 TABLET, FILM COATED ORAL 2 TIMES DAILY
Status: DISCONTINUED | OUTPATIENT
Start: 2024-05-04 | End: 2024-05-06

## 2024-05-04 RX ADMIN — APIXABAN 2.5 MG: 2.5 TABLET, FILM COATED ORAL at 19:45

## 2024-05-04 RX ADMIN — Medication 3 MG: at 21:16

## 2024-05-04 RX ADMIN — AMLODIPINE BESYLATE 10 MG: 10 TABLET ORAL at 08:44

## 2024-05-04 RX ADMIN — DOCUSATE SODIUM 50 MG AND SENNOSIDES 8.6 MG 1 TABLET: 8.6; 5 TABLET, FILM COATED ORAL at 09:28

## 2024-05-04 RX ADMIN — POTASSIUM CHLORIDE 20 MEQ: 750 TABLET, EXTENDED RELEASE ORAL at 06:08

## 2024-05-04 RX ADMIN — POTASSIUM & SODIUM PHOSPHATES POWDER PACK 280-160-250 MG 1 PACKET: 280-160-250 PACK at 19:45

## 2024-05-04 RX ADMIN — POTASSIUM & SODIUM PHOSPHATES POWDER PACK 280-160-250 MG 1 PACKET: 280-160-250 PACK at 16:58

## 2024-05-04 RX ADMIN — ACETAMINOPHEN 975 MG: 325 TABLET, FILM COATED ORAL at 19:44

## 2024-05-04 RX ADMIN — ALLOPURINOL 100 MG: 100 TABLET ORAL at 08:44

## 2024-05-04 RX ADMIN — OMEPRAZOLE 40 MG: 20 CAPSULE, DELAYED RELEASE ORAL at 08:44

## 2024-05-04 RX ADMIN — BUDESONIDE 0.5 MG: 0.5 INHALANT RESPIRATORY (INHALATION) at 09:42

## 2024-05-04 RX ADMIN — ROSUVASTATIN CALCIUM 10 MG: 10 TABLET, FILM COATED ORAL at 19:44

## 2024-05-04 RX ADMIN — CLOPIDOGREL BISULFATE 75 MG: 75 TABLET ORAL at 08:44

## 2024-05-04 RX ADMIN — METOPROLOL TARTRATE 50 MG: 25 TABLET, FILM COATED ORAL at 20:16

## 2024-05-04 RX ADMIN — BUDESONIDE 0.5 MG: 0.5 INHALANT RESPIRATORY (INHALATION) at 20:34

## 2024-05-04 RX ADMIN — LIDOCAINE 4% 2 PATCH: 40 PATCH TOPICAL at 20:21

## 2024-05-04 RX ADMIN — FLUTICASONE FUROATE AND VILANTEROL TRIFENATATE 1 PUFF: 100; 25 POWDER RESPIRATORY (INHALATION) at 08:44

## 2024-05-04 RX ADMIN — ACETAMINOPHEN 975 MG: 325 TABLET, FILM COATED ORAL at 04:13

## 2024-05-04 RX ADMIN — ACETAMINOPHEN 975 MG: 325 TABLET, FILM COATED ORAL at 12:50

## 2024-05-04 ASSESSMENT — ACTIVITIES OF DAILY LIVING (ADL)
ADLS_ACUITY_SCORE: 32
ADLS_ACUITY_SCORE: 31
ADLS_ACUITY_SCORE: 32
ADLS_ACUITY_SCORE: 35
ADLS_ACUITY_SCORE: 32
ADLS_ACUITY_SCORE: 31
ADLS_ACUITY_SCORE: 37
ADLS_ACUITY_SCORE: 32
ADLS_ACUITY_SCORE: 32
ADLS_ACUITY_SCORE: 31
ADLS_ACUITY_SCORE: 32
ADLS_ACUITY_SCORE: 31
ADLS_ACUITY_SCORE: 32
ADLS_ACUITY_SCORE: 37
ADLS_ACUITY_SCORE: 31

## 2024-05-04 NOTE — PLAN OF CARE
Patient Hgb was 7.5 this am,provider ordered I unit of red blood cells to be transfused this evening,pt was started on blood transfusion at 1915,pt denied any discomfort since started on transfusion,vitals stable,no signs of reaction noted since blood running..Continue per plan of care.

## 2024-05-04 NOTE — PLAN OF CARE
"BP (!) 141/59 (BP Location: Right arm)   Pulse 64   Temp 98.3  F (36.8  C)   Resp 16   Ht 1.676 m (5' 6\")   Wt 65.8 kg (145 lb 1.6 oz)   SpO2 100%   BMI 23.42 kg/m      Hours of care: [id number]     Vitals: Afebrile, VSS except for hypertension.     Neuro: A&Ox4. Patient complains of intermittent dizziness.   Cardiac: Patient denies chest pain.            Respiratory: RA, lung sounds clear, denies SOB  GI/: Voiding spontaneously. BM X2  Diet/appetite: Tolerating regular diet. Denies nausea.   Activity: Assist x1 w/gaitbelt and walker.    Pain: Patient has abdominal pain managed with tylenol.   Skin: No new deficits noted. Sutures removed by provider on patient's abdominal incision.   Lines: PIV saline locked   Drains: none  Replacements: Phosphorous 1st dose replacement given per order protocol. Patient will need 2nd dose this evening. Oncoming nurse is aware.      Plan: Continue with current POC. Report changes to primary team.        Goal Outcome Evaluation:      Plan of Care Reviewed With: patient    Overall Patient Progress: no change    Problem: Adult Inpatient Plan of Care  Goal: Plan of Care Review  Description: The Plan of Care Review/Shift note should be completed every shift.  The Outcome Evaluation is a brief statement about your assessment that the patient is improving, declining, or no change.  This information will be displayed automatically on your shift  note.  Outcome: Progressing  Flowsheets (Taken 5/4/2024 1602)  Plan of Care Reviewed With: patient  Overall Patient Progress: no change  Goal: Patient-Specific Goal (Individualized)  Description: You can add care plan individualizations to a care plan. Examples of Individualization might be:  \"Parent requests to be called daily at 9am for status\", \"I have a hard time hearing out of my right ear\", or \"Do not touch me to wake me up as it startles  me\".  Outcome: Progressing  Goal: Absence of Hospital-Acquired Illness or Injury  Outcome: " Progressing  Intervention: Identify and Manage Fall Risk  Recent Flowsheet Documentation  Taken 5/4/2024 0900 by Gracia Asher RN  Safety Promotion/Fall Prevention:   clutter free environment maintained   nonskid shoes/slippers when out of bed   patient and family education   room near nurse's station   room organization consistent   safety round/check completed  Intervention: Prevent Skin Injury  Recent Flowsheet Documentation  Taken 5/4/2024 0900 by Gracia Asher RN  Body Position: position changed independently  Skin Protection: protective footwear used  Device Skin Pressure Protection: adhesive use limited  Intervention: Prevent and Manage VTE (Venous Thromboembolism) Risk  Recent Flowsheet Documentation  Taken 5/4/2024 0900 by Gracia Asher RN  VTE Prevention/Management: (ambulation promoted) SCDs (sequential compression devices) off  Intervention: Prevent Infection  Recent Flowsheet Documentation  Taken 5/4/2024 0900 by Gracia Asher RN  Infection Prevention:   single patient room provided   visitors restricted/screened   rest/sleep promoted   hand hygiene promoted   cohorting utilized  Goal: Optimal Comfort and Wellbeing  Outcome: Progressing  Intervention: Monitor Pain and Promote Comfort  Recent Flowsheet Documentation  Taken 5/4/2024 1340 by Gracia Asher RN  Pain Management Interventions: medication (see MAR)  Taken 5/4/2024 1250 by Gracia Asher RN  Pain Management Interventions: medication (see MAR)  Taken 5/4/2024 0900 by Gracia Asher RN  Pain Management Interventions: medication (see MAR)  Goal: Readiness for Transition of Care  Outcome: Progressing     Problem: Gastrointestinal Bleeding  Goal: Optimal Coping with Acute Illness  Outcome: Progressing  Intervention: Optimize Psychosocial Response  Recent Flowsheet Documentation  Taken 5/4/2024 0900 by Gracia Asher RN  Supportive Measures:   active listening utilized   relaxation techniques  promoted   self-care encouraged   self-reflection promoted  Goal: Hemostasis  Outcome: Progressing  Intervention: Manage Gastrointestinal Bleeding  Recent Flowsheet Documentation  Taken 5/4/2024 0900 by Gracia Asher RN  Bleeding Management: dressing monitored  Environmental Support:   calm environment promoted   distractions minimized   personal routine supported   rest periods encouraged     Problem: Fall Injury Risk  Goal: Absence of Fall and Fall-Related Injury  Outcome: Progressing  Intervention: Identify and Manage Contributors  Recent Flowsheet Documentation  Taken 5/4/2024 0900 by Gracia Asher RN  Self-Care Promotion: independence encouraged  Medication Review/Management: medications reviewed  Intervention: Promote Injury-Free Environment  Recent Flowsheet Documentation  Taken 5/4/2024 0900 by Gracia Asher RN  Safety Promotion/Fall Prevention:   clutter free environment maintained   nonskid shoes/slippers when out of bed   patient and family education   room near nurse's station   room organization consistent   safety round/check completed

## 2024-05-04 NOTE — PROGRESS NOTES
Surgery note:    Staples removed  Ok to shower    Likely will not need further follow-up at this time from us-will update our clinic

## 2024-05-04 NOTE — PLAN OF CARE
Physical Therapy: Orders received. Chart reviewed and discussed with care team.? Physical Therapy not indicated due to patient mobilizing at baseline, recent TCU stay, denies regression in mobility since discharge from TCU. OT will follow while IP, pt was able to ambulate and complete stairs with prn assist from spouse.? Defer discharge recommendations to OT.? Will complete orders.

## 2024-05-04 NOTE — PROGRESS NOTES
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 She is hemodynamically stable with stable hemoglobin, and well appearing. She was admitted for EGD (no findings 5/2). She requires admission to resume her anticoagulation medications while monitoring for melena.     Acute blood loss anemia of secondary to GI bleeding of unclear source with known history of angiodysplastic lesion in ascending colon and diverticulosis, all are POA  Presented with one day of melana. Admission Hgb 8.1, near baseline 8-10. Normal vitals. Not tachycardic however on beta blocker pta. CT abdomen/pelvis without acute hemorrhage/fluid collection.  EGD 5/01 \"There was patchy nodular mucosa in body of stomach which had mild oozing with irritation. Unlikely to be the cause of melana. Highest on the differential is most likely oozing AVM in the setting of DOAC and antiplatelet therapy. The patient received PPI IV BID on admission, EGD without acute lesion. The patient was resumed on her antiplatelets on 5/3  - Continue clopidogrel  - Continue omeprazole daily  - Avoid NSAIDs  - Resumed apixaban for " "anticoagulation today 5/4 in a step manner resumption approach  - Appreciative to the input of gastroentrology    Hypomagnesia and hypophosphatemia, not clear if present on admission  - Ordered replacement protocol     Nausea  Some today. No new abdominal pain.   - PRN zofran        Recent admission (4/2023) for small bowel obstruction with possible ischemia  Evolving postsurgical edema and/or fat necrosis  She was admitted 4/13 to 4/22. She underwent laporoscopy on 4/13 c/b bleeding with return to the OR 4/13/24. Abdominal closure completed 4/14.  CT abdomen/pelvis here notable for \"Postsurgical changes of the abdomen with ventral abdominal wall incision. Immediately deep to the incision and rectus abdominis musculature, there is stranding associated with lobules of fat, likely evolving postsurgical edema and/or fat necrosis. Soft tissue  thickening in the transverse mesocolon and small amount of blood products in the right central mesentery, also likely evolving surgical change\". She is not having new abdominal pain. Has previous pain that is present at the superior portion of her abdominal incision.   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  Hypertension, uncontrolled  - Continue pta amlodipine   - holding pta furosemide   - hold isorbide mononitrate   - resumed metoprolol   - hold potassium chloride        Chronic medical problems:  gout  - pta allopurinol         HLD  - pta rosuvastatin        Diet: Regular Diet Adult  Snacks/Supplements Adult: Ensure Enlive; With Meals    DVT Prophylaxis: none, brady   Bonner Catheter: Not present  Fluids: none   Lines: None     Cardiac Monitoring: None  Code Status: Full Code      Clinically Significant Risk Factors        # Hypokalemia: " Lowest K = 3.3 mmol/L in last 2 days, will replace as needed       # Hypoalbuminemia: Lowest albumin = 3.3 g/dL at 5/4/2024  4:51 AM, will monitor as appropriate    # Coagulation Defect: INR = 1.30 (Ref range: 0.85 - 1.15) and/or PTT = 40 Seconds (Ref range: 22 - 38 Seconds), will monitor for bleeding    # Hypertension: Noted on problem list  # Chronic heart failure with preserved ejection fraction: heart failure noted on problem list and last echo with EF >50%        # Moderate Malnutrition: based on nutrition assessment, PRESENT ON ADMISSION   # Financial/Environmental Concerns: none   # Pacemaker present       Disposition Plan      Expected Discharge Date: 05/06/2024      Destination: home with family;home with help/services            Jeancarlos Velásquez MD  Medicine Service, [address]  Securely message with [email or web address] (more info)  Text page via [email or web address] Paging/Directory   See signed in provider for up to date coverage information  ______________________________________________________________________    Interval History   The patient denied any loose bowel movements and admitted to improvement in the Colour of her stools     Physical Exam   Vital Signs: Temp: 98.3  F (36.8  C) Temp src: Axillary BP: (!) 141/59 Pulse: 64   Resp: 16 SpO2: 100 % O2 Device: None (Room air)    Weight: 145 lbs 1.6 oz    General: Well appearing, nontoxic  HEENT: MMM  CV: RRR, no murmurs heard, extremities perfused, no lower extremity edema   Resp: breathing comfortably on room air, CTAB  Abd: soft, nondistended, mild tenderness around the abdominal incision staples (stable). No redness around the abdominal incision. No swelling.   Neuro: moving all extremities, upper and lower extremity 5/5    Medical Decision Making       Please see A&P for additional details of medical decision making.      Data   ------------------------- PAST 24 HR DATA REVIEWED  -----------------------------------------------

## 2024-05-04 NOTE — PROGRESS NOTES
Care Management Follow Up    Length of Stay (days): 3    Expected Discharge Date: 05/06/2024     Concerns to be Addressed: discharge planning     Patient plan of care discussed at interdisciplinary rounds: Yes    Anticipated Discharge Disposition: Home with family     Anticipated Discharge Services:  home care: RN, PT, OT  Anticipated Discharge DME: None    Patient/family educated on Medicare website which has current facility and service quality ratings: yes  Education Provided on the Discharge Plan: Yes  Patient/Family in Agreement with the Plan: yes    Referrals Placed by CM/SW: Homecare (via [facility] hub)  Private pay costs discussed: Not applicable    Additional Information:  Patient accepted by [facility] with no delay in start of care.  RNCC to call [facility] RN liaison upon discharge.      Teodora Hall RN BSN   Cell:   [phone]  Email: [email or web address]      [facility]      Address   [address] [place] [address]             Contact Information    [phone]          RNCC will continue to follow for discharge planning.         MAURICE Cox   5/4/2024  Nurse Coordinator      Social Work and Care Management Department       SEARCHABLE in [facility] - search CARE COORDINATOR     [place] ([phone]) Saturday & Sunday; ([phone]) [facility] Recognized Holidays     Units: 5A Onc Vocera & 5C Vocera Pager: [phone]    Units: 6B Vocera & 6C Vocera  Pager: [phone]    Units: 7A SOT RNCC Vocera, 7B Med Surg Vocera, & 7C Med Surg Vocera  Pager: [phone]    Units: 6A Vocera & 4A CVICU Vocera, 4C MICU Vocera, and 4E SICU Vocera   Pager: [phone]    Units: 5 Ortho Vocera & 5 Med Surg Vocera  Pager: [phone]    Units: 6 Med Surg Vocera & 8 Med Surg Vocera  Pager [phone]

## 2024-05-04 NOTE — PLAN OF CARE
"BP (!) 157/72 (BP Location: Right arm, Cuff Size: Adult Regular)   Pulse 73   Temp 97.8  F (36.6  C) (Axillary)   Resp 16   Ht 1.676 m (5' 6\")   Wt 65.3 kg (143 lb 14.4 oz)   SpO2 99%   BMI 23.23 kg/m        Neuro: A&Ox4. No new deficits noted.   Cardiac: afebrile. OVSS. Denied SOB.   Respiratory: Sating at 99% on RA.  GI/: denies  diarrhea, c/o of nausea without emesis, denied intervention. Adequate urine output. No BM on this shift.   Diet/appetite: high kcal/high protein diet.  Activity:  SBA.  Pain: At acceptable level on current regimen. C/o of abdominal pressure and incision pain. Schedule lidocaine patch (2 patch applied to abdomen), and schedule tylenol given.  Skin: No new deficits noted. Incision site CDI.   LDA's: right PIV, saline lock.     Plan: pt received 1 unit of PRBC's at HS.  Pt need potassium replace, 20 mEq tab given for replacement. Recheck K lab 4 hrs after. No other replacement needed. Continue with POC. Notify primary team with changes.   Problem: Adult Inpatient Plan of Care  Goal: Absence of Hospital-Acquired Illness or Injury  Intervention: Identify and Manage Fall Risk  Recent Flowsheet Documentation  Taken 5/4/2024 0016 by Jennifer Noguera, RN  Safety Promotion/Fall Prevention:   activity supervised   safety round/check completed   mobility aid in reach   assistive device/personal items within reach  Taken 5/3/2024 2045 by Jennifer Noguera RN  Safety Promotion/Fall Prevention:   activity supervised   safety round/check completed   mobility aid in reach   assistive device/personal items within reach  Intervention: Prevent Skin Injury  Recent Flowsheet Documentation  Taken 5/3/2024 2045 by Jennifer Noguera, RN  Body Position: position changed independently  Skin Protection: transparent dressing maintained  Device Skin Pressure Protection: adhesive use limited  Intervention: Prevent Infection  Recent Flowsheet Documentation  Taken 5/4/2024 0016 by Jennifer Noguera, RN  Infection " Prevention:   single patient room provided   rest/sleep promoted   personal protective equipment utilized   hand hygiene promoted   equipment surfaces disinfected   cohorting utilized  Taken 5/3/2024 2045 by Jennifer Noguera, RN  Infection Prevention:   single patient room provided   rest/sleep promoted   personal protective equipment utilized   hand hygiene promoted   equipment surfaces disinfected   cohorting utilized     Problem: Gastrointestinal Bleeding  Goal: Optimal Coping with Acute Illness  Intervention: Optimize Psychosocial Response  Recent Flowsheet Documentation  Taken 5/3/2024 2045 by Jennifer Noguera, RN  Supportive Measures: active listening utilized     Problem: Fall Injury Risk  Goal: Absence of Fall and Fall-Related Injury  Intervention: Identify and Manage Contributors  Recent Flowsheet Documentation  Taken 5/4/2024 0016 by Jennifer Noguera, RN  Medication Review/Management: medications reviewed  Taken 5/3/2024 2045 by Jennifer Noguera RN  Medication Review/Management: medications reviewed  Intervention: Promote Injury-Free Environment  Recent Flowsheet Documentation  Taken 5/4/2024 0016 by Jennifer Noguera, RN  Safety Promotion/Fall Prevention:   activity supervised   safety round/check completed   mobility aid in reach   assistive device/personal items within reach  Taken 5/3/2024 2045 by Jennifer Noguera, RN  Safety Promotion/Fall Prevention:   activity supervised   safety round/check completed   mobility aid in reach   assistive device/personal items within reach   Goal Outcome Evaluation:

## 2024-05-05 LAB
ALBUMIN SERPL BCG-MCNC: 3.4 G/DL (ref 3.5–5.2)
ALP SERPL-CCNC: 70 U/L (ref 40–150)
ALT SERPL W P-5'-P-CCNC: 17 U/L (ref 0–50)
ANION GAP SERPL CALCULATED.3IONS-SCNC: 11 MMOL/L (ref 7–15)
AST SERPL W P-5'-P-CCNC: 30 U/L (ref 0–45)
BASOPHILS # BLD AUTO: ABNORMAL 10*3/UL
BASOPHILS # BLD MANUAL: 0.1 10E3/UL (ref 0–0.2)
BASOPHILS NFR BLD AUTO: ABNORMAL %
BASOPHILS NFR BLD MANUAL: 1 %
BILIRUB SERPL-MCNC: 0.3 MG/DL
BUN SERPL-MCNC: 20 MG/DL (ref 8–23)
CALCIUM SERPL-MCNC: 8.6 MG/DL (ref 8.8–10.2)
CHLORIDE SERPL-SCNC: 111 MMOL/L (ref 98–107)
CREAT SERPL-MCNC: 1.54 MG/DL (ref 0.51–0.95)
DEPRECATED HCO3 PLAS-SCNC: 23 MMOL/L (ref 22–29)
EGFRCR SERPLBLD CKD-EPI 2021: 32 ML/MIN/1.73M2
EOSINOPHIL # BLD AUTO: ABNORMAL 10*3/UL
EOSINOPHIL # BLD MANUAL: 0.1 10E3/UL (ref 0–0.7)
EOSINOPHIL NFR BLD AUTO: ABNORMAL %
EOSINOPHIL NFR BLD MANUAL: 2 %
ERYTHROCYTE [DISTWIDTH] IN BLOOD BY AUTOMATED COUNT: 18.7 % (ref 10–15)
GLUCOSE SERPL-MCNC: 104 MG/DL (ref 70–99)
HCT VFR BLD AUTO: 27.1 % (ref 35–47)
HGB BLD-MCNC: 8.3 G/DL (ref 11.7–15.7)
IMM GRANULOCYTES # BLD: ABNORMAL 10*3/UL
IMM GRANULOCYTES NFR BLD: ABNORMAL %
LYMPHOCYTES # BLD AUTO: ABNORMAL 10*3/UL
LYMPHOCYTES # BLD MANUAL: 1.5 10E3/UL (ref 0.8–5.3)
LYMPHOCYTES NFR BLD AUTO: ABNORMAL %
LYMPHOCYTES NFR BLD MANUAL: 22 %
MAGNESIUM SERPL-MCNC: 1.7 MG/DL (ref 1.7–2.3)
MCH RBC QN AUTO: 27.6 PG (ref 26.5–33)
MCHC RBC AUTO-ENTMCNC: 30.6 G/DL (ref 31.5–36.5)
MCV RBC AUTO: 90 FL (ref 78–100)
MONOCYTES # BLD AUTO: ABNORMAL 10*3/UL
MONOCYTES # BLD MANUAL: 1.1 10E3/UL (ref 0–1.3)
MONOCYTES NFR BLD AUTO: ABNORMAL %
MONOCYTES NFR BLD MANUAL: 16 %
MYELOCYTES # BLD MANUAL: 0.1 10E3/UL
MYELOCYTES NFR BLD MANUAL: 1 %
NEUTROPHILS # BLD AUTO: ABNORMAL 10*3/UL
NEUTROPHILS # BLD MANUAL: 4.1 10E3/UL (ref 1.6–8.3)
NEUTROPHILS NFR BLD AUTO: ABNORMAL %
NEUTROPHILS NFR BLD MANUAL: 58 %
NRBC # BLD AUTO: 0 10E3/UL
NRBC # BLD AUTO: 0.1 10E3/UL
NRBC BLD AUTO-RTO: 0 /100
NRBC BLD MANUAL-RTO: 1 %
PHOSPHATE SERPL-MCNC: 2.6 MG/DL (ref 2.5–4.5)
PLAT MORPH BLD: ABNORMAL
PLATELET # BLD AUTO: 110 10E3/UL (ref 150–450)
POLYCHROMASIA BLD QL SMEAR: SLIGHT
POTASSIUM SERPL-SCNC: 4 MMOL/L (ref 3.4–5.3)
PROT SERPL-MCNC: 5.8 G/DL (ref 6.4–8.3)
RBC # BLD AUTO: 3.01 10E6/UL (ref 3.8–5.2)
RBC MORPH BLD: ABNORMAL
SODIUM SERPL-SCNC: 145 MMOL/L (ref 135–145)
WBC # BLD AUTO: 7 10E3/UL (ref 4–11)

## 2024-05-05 PROCEDURE — 83735 ASSAY OF MAGNESIUM: CPT | Performed by: INTERNAL MEDICINE

## 2024-05-05 PROCEDURE — 82040 ASSAY OF SERUM ALBUMIN: CPT | Performed by: INTERNAL MEDICINE

## 2024-05-05 PROCEDURE — 84100 ASSAY OF PHOSPHORUS: CPT | Performed by: INTERNAL MEDICINE

## 2024-05-05 PROCEDURE — 36415 COLL VENOUS BLD VENIPUNCTURE: CPT | Performed by: INTERNAL MEDICINE

## 2024-05-05 PROCEDURE — 99232 SBSQ HOSP IP/OBS MODERATE 35: CPT | Mod: GC | Performed by: INTERNAL MEDICINE

## 2024-05-05 PROCEDURE — 85027 COMPLETE CBC AUTOMATED: CPT | Performed by: INTERNAL MEDICINE

## 2024-05-05 PROCEDURE — 84155 ASSAY OF PROTEIN SERUM: CPT | Performed by: INTERNAL MEDICINE

## 2024-05-05 PROCEDURE — 94640 AIRWAY INHALATION TREATMENT: CPT

## 2024-05-05 PROCEDURE — 85007 BL SMEAR W/DIFF WBC COUNT: CPT | Performed by: INTERNAL MEDICINE

## 2024-05-05 PROCEDURE — 999N000157 HC STATISTIC RCP TIME EA 10 MIN

## 2024-05-05 PROCEDURE — 250N000013 HC RX MED GY IP 250 OP 250 PS 637

## 2024-05-05 PROCEDURE — 120N000005 HC R&B MS OVERFLOW UMMC

## 2024-05-05 PROCEDURE — 94640 AIRWAY INHALATION TREATMENT: CPT | Mod: 76

## 2024-05-05 PROCEDURE — 250N000013 HC RX MED GY IP 250 OP 250 PS 637: Performed by: INTERNAL MEDICINE

## 2024-05-05 PROCEDURE — 250N000009 HC RX 250: Performed by: INTERNAL MEDICINE

## 2024-05-05 RX ADMIN — CLOPIDOGREL BISULFATE 75 MG: 75 TABLET ORAL at 08:24

## 2024-05-05 RX ADMIN — METOPROLOL TARTRATE 50 MG: 25 TABLET, FILM COATED ORAL at 08:23

## 2024-05-05 RX ADMIN — LIDOCAINE 4% 2 PATCH: 40 PATCH TOPICAL at 19:35

## 2024-05-05 RX ADMIN — ACETAMINOPHEN 975 MG: 325 TABLET, FILM COATED ORAL at 04:10

## 2024-05-05 RX ADMIN — FLUTICASONE FUROATE AND VILANTEROL TRIFENATATE 1 PUFF: 100; 25 POWDER RESPIRATORY (INHALATION) at 08:24

## 2024-05-05 RX ADMIN — ACETAMINOPHEN 975 MG: 325 TABLET, FILM COATED ORAL at 19:35

## 2024-05-05 RX ADMIN — ALLOPURINOL 100 MG: 100 TABLET ORAL at 08:23

## 2024-05-05 RX ADMIN — AMLODIPINE BESYLATE 10 MG: 10 TABLET ORAL at 08:23

## 2024-05-05 RX ADMIN — APIXABAN 2.5 MG: 2.5 TABLET, FILM COATED ORAL at 19:35

## 2024-05-05 RX ADMIN — OMEPRAZOLE 40 MG: 20 CAPSULE, DELAYED RELEASE ORAL at 08:24

## 2024-05-05 RX ADMIN — BUDESONIDE 0.5 MG: 0.5 INHALANT RESPIRATORY (INHALATION) at 20:58

## 2024-05-05 RX ADMIN — ACETAMINOPHEN 975 MG: 325 TABLET, FILM COATED ORAL at 12:47

## 2024-05-05 RX ADMIN — Medication 3 MG: at 21:46

## 2024-05-05 RX ADMIN — METOPROLOL TARTRATE 50 MG: 25 TABLET, FILM COATED ORAL at 19:35

## 2024-05-05 RX ADMIN — ROSUVASTATIN CALCIUM 10 MG: 10 TABLET, FILM COATED ORAL at 19:35

## 2024-05-05 RX ADMIN — BUDESONIDE 0.5 MG: 0.5 INHALANT RESPIRATORY (INHALATION) at 08:17

## 2024-05-05 RX ADMIN — APIXABAN 2.5 MG: 2.5 TABLET, FILM COATED ORAL at 08:24

## 2024-05-05 NOTE — PLAN OF CARE
"BP (!) 141/63 (BP Location: Left arm)   Pulse 65   Temp 98.3  F (36.8  C) (Oral)   Resp 20   Ht 1.676 m (5' 6\")   Wt 66.5 kg (146 lb 9.6 oz)   SpO2 99%   BMI 23.66 kg/m      Hours of care: 5433-4770     Vitals: Afebrile, VSS except for hypertension.      Neuro: A&Ox4. Patient complains of intermittent dizziness. Negative orthostatic BPs.  Cardiac: Patient denies chest pain.            Respiratory: RA, lung sounds clear, denies SOB  GI/: Voiding spontaneously.   Diet/appetite: Tolerating regular diet. Denies nausea.   Activity: Assist x1 w/gaitbelt and walker. Bed alarm on.   Pain: Patient has abdominal pain managed with scheduled tylenol.   Skin: No new deficits noted.   Lines: PIV saline locked   Drains: none  Replacements: No further replacements required this shift.     Plan: Continue with current POC. Report changes to primary team.    Goal Outcome Evaluation:      Plan of Care Reviewed With: patient    Overall Patient Progress: improving    Problem: Adult Inpatient Plan of Care  Goal: Plan of Care Review  Description: The Plan of Care Review/Shift note should be completed every shift.  The Outcome Evaluation is a brief statement about your assessment that the patient is improving, declining, or no change.  This information will be displayed automatically on your shift  note.  Outcome: Progressing  Flowsheets (Taken 5/5/2024 1431)  Plan of Care Reviewed With: patient  Overall Patient Progress: improving  Goal: Patient-Specific Goal (Individualized)  Description: You can add care plan individualizations to a care plan. Examples of Individualization might be:  \"Parent requests to be called daily at 9am for status\", \"I have a hard time hearing out of my right ear\", or \"Do not touch me to wake me up as it startles  me\".  Outcome: Progressing  Goal: Absence of Hospital-Acquired Illness or Injury  Outcome: Progressing  Intervention: Identify and Manage Fall Risk  Recent Flowsheet Documentation  Taken 5/5/2024 " 0830 by Gracia Asher RN  Safety Promotion/Fall Prevention:   clutter free environment maintained   increased rounding and observation   lighting adjusted   nonskid shoes/slippers when out of bed   mobility aid in reach   patient and family education   room organization consistent   safety round/check completed   supervised activity  Intervention: Prevent Skin Injury  Recent Flowsheet Documentation  Taken 5/5/2024 0830 by Gracia Asher RN  Body Position: position changed independently  Skin Protection:   incontinence pads utilized   transparent dressing maintained  Device Skin Pressure Protection:   adhesive use limited   tubing/devices free from skin contact  Intervention: Prevent and Manage VTE (Venous Thromboembolism) Risk  Recent Flowsheet Documentation  Taken 5/5/2024 0830 by Gracia Asher RN  VTE Prevention/Management: SCDs (sequential compression devices) off  Intervention: Prevent Infection  Recent Flowsheet Documentation  Taken 5/5/2024 0830 by Gracia Asher RN  Infection Prevention:   environmental surveillance performed   equipment surfaces disinfected   hand hygiene promoted   personal protective equipment utilized   rest/sleep promoted   single patient room provided   visitors restricted/screened  Goal: Optimal Comfort and Wellbeing  Outcome: Progressing  Goal: Readiness for Transition of Care  Outcome: Progressing     Problem: Gastrointestinal Bleeding  Goal: Optimal Coping with Acute Illness  Outcome: Progressing  Intervention: Optimize Psychosocial Response  Recent Flowsheet Documentation  Taken 5/5/2024 0830 by Gracia Asher RN  Supportive Measures:   active listening utilized   positive reinforcement provided   relaxation techniques promoted   self-care encouraged   self-reflection promoted   self-responsibility promoted   verbalization of feelings encouraged  Goal: Hemostasis  Outcome: Progressing  Intervention: Manage Gastrointestinal Bleeding  Recent Flowsheet  Documentation  Taken 5/5/2024 0830 by Gracia Asher RN  Bleeding Management: (incision)   dressing monitored   other (see comments)     Problem: Fall Injury Risk  Goal: Absence of Fall and Fall-Related Injury  Outcome: Progressing  Intervention: Identify and Manage Contributors  Recent Flowsheet Documentation  Taken 5/5/2024 0830 by Gracia Asher RN  Self-Care Promotion: independence encouraged  Medication Review/Management: medications reviewed  Intervention: Promote Injury-Free Environment  Recent Flowsheet Documentation  Taken 5/5/2024 0830 by Gracia Asher RN  Safety Promotion/Fall Prevention:   clutter free environment maintained   increased rounding and observation   lighting adjusted   nonskid shoes/slippers when out of bed   mobility aid in reach   patient and family education   room organization consistent   safety round/check completed   supervised activity

## 2024-05-05 NOTE — PROGRESS NOTES
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 She is hemodynamically stable with stable hemoglobin, and well appearing. She was admitted for EGD (no findings 5/2). She requires admission to resume her anticoagulation and cardiac medications while monitoring for melena and blood pressure.       Today:  - Get orthostatics today   - If negative, resume lasix         Dizziness  Noted 5/04 AM after waking up. No present while moving around, not currently present.   - Check orthostatics       Acute blood loss anemia secondary to GI bleeding of unclear source with known history of angiodysplastic lesion in ascending colon and diverticulosis  Presented with one day of melana. Admission Hgb 8.1, near baseline 8-10. Normal vitals. Not tachycardic however on beta blocker pta. CT abdomen/pelvis without acute hemorrhage/fluid collection.  EGD 5/01 \"There was patchy nodular mucosa in body of stomach which had mild oozing with irritation. Unlikely to be the cause of melana. Highest on the differential is most likely oozing AVM in the setting of DOAC and antiplatelet therapy. The patient received PPI IV BID on " "admission. Resumed on her antiplatelets on 5/3  - Continue pta clopidogrel  (resumed 5/03) and apixaban (5/04)  - Continue omeprazole daily  - Avoid NSAIDs  - GI consulted, appreciate recs       Hypomagnesia and hypophosphatemia, not clear if present on admission  - On replacement protocol       Nausea  Some today. No new abdominal pain.   - PRN zofran        Recent admission (4/2023) for small bowel obstruction with possible ischemia  Evolving postsurgical edema and/or fat necrosis  She was admitted 4/13 to 4/22. She underwent laporoscopy on 4/13 c/b bleeding with return to the OR 4/13/24. Abdominal closure completed 4/14.  CT abdomen/pelvis here notable for \"Postsurgical changes of the abdomen with ventral abdominal wall incision. Immediately deep to the incision and rectus abdominis musculature, there is stranding associated with lobules of fat, likely evolving postsurgical edema and/or fat necrosis. Soft tissue  thickening in the transverse mesocolon and small amount of blood products in the right central mesentery, also likely evolving surgical change\". She is not having new abdominal pain. Has previous pain that is present at the superior portion of her abdominal incision.   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 If orthostatic negative, will resume, if positive will give small fluid bolus and hold off  - hold isorbide mononitrate   - rpta metoprolol (resumed 5/04)  - hold potassium chloride, on replacement protocol        -----------------------Chronic medical problems---------------------------  Gout -  pta allopurinol      HLD  - pta rosuvastatin        Diet: Regular Diet " Adult  Snacks/Supplements Adult: Ensure Enlive; With Meals    DVT Prophylaxis: none, brady   Bonner Catheter: Not present  Fluids: none   Lines: None     Cardiac Monitoring: None  Code Status: Full Code      Clinically Significant Risk Factors        # Hypokalemia: Lowest K = 3.3 mmol/L in last 2 days, will replace as needed       # Hypoalbuminemia: Lowest albumin = 3.3 g/dL at 5/4/2024  4:51 AM, will monitor as appropriate     # Thrombocytopenia: Lowest platelets = 110 in last 2 days, will monitor for bleeding   # Hypertension: Noted on problem list  # Chronic heart failure with preserved ejection fraction: heart failure noted on problem list and last echo with EF >50%        # Moderate Malnutrition: based on nutrition assessment, PRESENT ON ADMISSION   # Financial/Environmental Concerns: none   # Pacemaker present       Disposition Plan     Expected Discharge Date: 05/06/2024      Destination: home with family;home with help/services            Sabrina Torres MD  Medicine Service, [facility] TEAM [address] [place] [address]  Securely message with [employer] (more info)  Text page via [facility] Paging/Directory   See signed in provider for up to date coverage information  ______________________________________________________________________    Interval History   No acute overnight events. Dizziness on nursing report. Per patient, this was present yesterday morning when she woke up. She was still laying down in bed. It was not present when she was moving around. No dizziness today. No CP/SOB/newAbd pain/n/v.     Physical Exam   Vital Signs: Temp: 97.9  F (36.6  C) Temp src: Oral BP: 137/76 Pulse: 65   Resp: 16 SpO2: 100 % O2 Device: None (Room air)    Weight: 146 lbs 9.6 oz    General: Well appearing, nontoxic  HEENT: MMM  CV: RRR, no murmurs heard, extremities perfused, no lower extremity edema   Resp: breathing comfortably on room air, CTAB  Abd: soft, nondistended, mild tenderness around  the abdominal incision staples (stable). No redness around the abdominal incision. No swelling.   Neuro: moving all extremities, upper and lower extremity 5/5    Medical Decision Making       Please see A&P for additional details of medical decision making.      Data   ------------------------- PAST 24 HR DATA REVIEWED -----------------------------------------------

## 2024-05-05 NOTE — PLAN OF CARE
Pt A/O x4. VSS on RA. Afebrile. Denies pain with use of scheduled Tylenol and Lidocaine patches. Bed alarm on for Pt safety. Up with x1 assist, gait belt and walker to BS commode. Pt unsteady. Denies light-headedness or dizziness. No bleeding noted and urine clear. One BM. RA PIV SL and patent. No electrolyte supplements required. Able to sleep between cares.     Problem: Adult Inpatient Plan of Care  Goal: Plan of Care Review  Outcome: Progressing  Goal: Absence of Hospital-Acquired Illness or Injury  Outcome: Progressing  Intervention: Identify and Manage Fall Risk  Recent Flowsheet Documentation  Taken 5/5/2024 0410 by Ruby Nugent RN  Safety Promotion/Fall Prevention:   clutter free environment maintained   increased rounding and observation   lighting adjusted   nonskid shoes/slippers when out of bed   mobility aid in [facility]   patient and family education   room organization consistent   safety round/check completed   supervised activity  Taken 5/4/2024 2314 by Ruby Nugent RN  Safety Promotion/Fall Prevention:   clutter free environment maintained   increased rounding and observation   lighting adjusted   nonskid shoes/slippers when out of bed   mobility aid in [facility]   patient and family education   room organization consistent   safety round/check completed   supervised activity  Taken 5/4/2024 2313 by Ruby Nugent RN  Safety Promotion/Fall Prevention:   clutter free environment maintained   increased rounding and observation   lighting adjusted   nonskid shoes/slippers when out of bed   mobility aid in [facility]   patient and family education   room organization consistent   safety round/check completed   supervised activity  Taken 5/4/2024 1944 by Ruby Nugent, RN  Safety Promotion/Fall Prevention:   clutter free environment maintained   increased rounding and observation   lighting adjusted   nonskid shoes/slippers when out of bed   mobility aid in [facility]   patient and [facility]    room organization consistent   safety round/check completed   supervised activity  Intervention: Prevent Skin Injury  Recent Flowsheet Documentation  Taken 5/5/2024 0410 by Ruby Nugent RN  Body Position: position changed independently  Skin Protection:   incontinence pads utilized   transparent dressing maintained  Device Skin Pressure Protection:   adhesive use limited   tubing/devices free from skin contact  Taken 5/4/2024 2313 by Ruby Nugent RN  Body Position: position changed independently  Taken 5/4/2024 2117 by Ruby Nugent RN  Body Position: position changed independently  Taken 5/4/2024 1944 by Ruby Nugent RN  Body Position: position changed independently  Skin Protection:   incontinence pads utilized   transparent dressing maintained  Device Skin Pressure Protection:   adhesive use limited   tubing/devices free from skin contact  Intervention: Prevent and Manage VTE (Venous Thromboembolism) Risk  Recent Flowsheet Documentation  Taken 5/4/2024 2314 by Ruby Nugent RN  VTE Prevention/Management:   SCDs (sequential compression devices) off   patient refused intervention  Taken 5/4/2024 2039 by Ruby Nugent RN  VTE Prevention/Management: SCDs (sequential compression devices) on  Taken 5/4/2024 1944 by Ruby Nugent RN  VTE Prevention/Management: SCDs (sequential compression devices) off  Intervention: Prevent Infection  Recent Flowsheet Documentation  Taken 5/5/2024 0410 by Ruby Nugent RN  Infection Prevention:   environmental surveillance performed   equipment surfaces disinfected   hand hygiene promoted   personal protective equipment utilized   rest/sleep promoted   single patient room provided   visitors restricted/screened  Taken 5/4/2024 2313 by Ruby Nugent RN  Infection Prevention:   environmental surveillance performed   equipment surfaces disinfected   hand hygiene promoted   personal protective equipment utilized   rest/sleep promoted   single  patient room provided   visitors restricted/screened  Taken 5/4/2024 1944 by Ruby Nugent RN  Infection Prevention:   environmental surveillance performed   equipment surfaces disinfected   hand hygiene promoted   personal protective equipment utilized   rest/sleep promoted   single patient room provided   visitors restricted/screened  Goal: Optimal Comfort and Wellbeing  Outcome: Progressing  Intervention: Monitor Pain and Promote Comfort  Recent Flowsheet Documentation  Taken 5/4/2024 2016 by Ruby Nugent RN  Pain Management Interventions:   medication (see MAR)   care clustered  Taken 5/4/2024 1944 by Ruby Nugent RN  Pain Management Interventions:   medication (see MAR)   distraction   food     Problem: Gastrointestinal Bleeding  Goal: Optimal Coping with Acute Illness  Outcome: Progressing  Intervention: Optimize Psychosocial Response  Recent Flowsheet Documentation  Taken 5/4/2024 1944 by Ruby Nugent RN  Supportive Measures:   active listening utilized   positive reinforcement provided   relaxation techniques promoted   self-care encouraged   self-reflection promoted   self-responsibility promoted   verbalization of feelings encouraged  Goal: Hemostasis  Outcome: Progressing  Intervention: Manage Gastrointestinal Bleeding  Recent Flowsheet Documentation  Taken 5/4/2024 2313 by Ruby Nugent RN  Bleeding Management: (incision)   dressing monitored   other (see comments)  Taken 5/4/2024 1944 by Ruby Nugent RN  Bleeding Management: (incision)   dressing monitored   other (see comments)     Problem: Fall Injury Risk  Goal: Absence of Fall and Fall-Related Injury  Outcome: Progressing  Intervention: Identify and Manage Contributors  Recent Flowsheet Documentation  Taken 5/5/2024 0410 by Ruby Nugent RN  Medication Review/Management: medications reviewed  Taken 5/4/2024 2313 by Ruby Nugent RN  Self-Care Promotion:   independence encouraged   BADL personal objects  within reach   BADL personal routines maintained   adaptive equipment use encouraged  Medication Review/Management: medications reviewed  Taken 5/4/2024 1944 by Ruby Nugent RN  Self-Care Promotion:   independence encouraged   BADL personal objects within reach   BADL personal routines maintained   adaptive equipment use encouraged  Medication Review/Management: medications reviewed  Intervention: Promote Injury-Free Environment  Recent Flowsheet Documentation  Taken 5/5/2024 0410 by Ruby Nugent RN  Safety Promotion/Fall Prevention:   clutter free environment maintained   increased rounding and observation   lighting adjusted   nonskid shoes/slippers when out of bed   mobility aid in reach   patient and [facility] education   room organization consistent   safety round/check completed   supervised activity  Taken 5/4/2024 2314 by Ruby Nugent RN  Safety Promotion/Fall Prevention:   clutter free environment maintained   increased rounding and observation   lighting adjusted   nonskid shoes/slippers when out of bed   mobility aid in reach   patient and [facility] education   room organization consistent   safety round/check completed   supervised activity  Taken 5/4/2024 2313 by Ruby Nugent RN  Safety Promotion/Fall Prevention:   clutter free environment maintained   increased rounding and observation   lighting adjusted   nonskid shoes/slippers when out of bed   mobility aid in reach   patient and [facility] education   room organization consistent   safety round/check completed   supervised activity  Taken 5/4/2024 1944 by Ruby Nugent RN  Safety Promotion/Fall Prevention:   clutter free environment maintained   increased rounding and observation   lighting adjusted   nonskid shoes/slippers when out of bed   mobility aid in [facility]   patient and [facility] education   room organization consistent   safety round/check completed   supervised activity

## 2024-05-05 NOTE — PROGRESS NOTES
BRIEF GI CHART REVIEW NOTE:    05/05/24    Note: patient not seen today, this note is the product of chart review    Update:  Hgb stable, no overt GI bleeding.     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 Hemoglobin baseline of 8, now 7.1.   Status-post EGD 5/2/2024 with no obvious abnormality to explain the patient's black stools. Nodular area in body of stomach was biopsied and returned as nodular reactive gastropathy on path.  Highest on the differential remains oozing AVM in the setting of DOAC and antiplatelet therapy with history of cardiac stents, chronic cardiac disease, chronic kidney disease, most likely AVM(s) in the small bowel versus potentially AVMs in the right colon. If ongoing evidence of overt GI bleeding with worsening anemia concerning for ongoing blood loss, will need to consider repeat colonoscopy.    Change in Recommendations:  - continue omeprazole 40mg daily   - No plan for colonoscopy unless overt GI bleeding.     GI team will sign off.     Patient discussed with on call GI staff Dr. Donaldson.     Jim Prieto MD   GI fellow

## 2024-05-06 ENCOUNTER — APPOINTMENT (OUTPATIENT)
Dept: PHYSICAL THERAPY | Facility: CLINIC | Age: 89
DRG: 378 | End: 2024-05-06
Attending: INTERNAL MEDICINE
Payer: COMMERCIAL

## 2024-05-06 ENCOUNTER — APPOINTMENT (OUTPATIENT)
Dept: OCCUPATIONAL THERAPY | Facility: CLINIC | Age: 89
DRG: 378 | End: 2024-05-06
Payer: COMMERCIAL

## 2024-05-06 ENCOUNTER — APPOINTMENT (OUTPATIENT)
Dept: GENERAL RADIOLOGY | Facility: CLINIC | Age: 89
DRG: 378 | End: 2024-05-06
Attending: INTERNAL MEDICINE
Payer: COMMERCIAL

## 2024-05-06 LAB
ANION GAP SERPL CALCULATED.3IONS-SCNC: 11 MMOL/L (ref 7–15)
BASOPHILS # BLD AUTO: ABNORMAL 10*3/UL
BASOPHILS # BLD MANUAL: 0.1 10E3/UL (ref 0–0.2)
BASOPHILS NFR BLD AUTO: ABNORMAL %
BASOPHILS NFR BLD MANUAL: 2 %
BUN SERPL-MCNC: 19.5 MG/DL (ref 8–23)
BURR CELLS BLD QL SMEAR: SLIGHT
CALCIUM SERPL-MCNC: 8.5 MG/DL (ref 8.8–10.2)
CHLORIDE SERPL-SCNC: 109 MMOL/L (ref 98–107)
CREAT SERPL-MCNC: 1.39 MG/DL (ref 0.51–0.95)
DEPRECATED HCO3 PLAS-SCNC: 22 MMOL/L (ref 22–29)
EGFRCR SERPLBLD CKD-EPI 2021: 37 ML/MIN/1.73M2
EOSINOPHIL # BLD AUTO: ABNORMAL 10*3/UL
EOSINOPHIL # BLD MANUAL: 0.2 10E3/UL (ref 0–0.7)
EOSINOPHIL NFR BLD AUTO: ABNORMAL %
EOSINOPHIL NFR BLD MANUAL: 3 %
ERYTHROCYTE [DISTWIDTH] IN BLOOD BY AUTOMATED COUNT: 19.1 % (ref 10–15)
GLUCOSE SERPL-MCNC: 98 MG/DL (ref 70–99)
HCT VFR BLD AUTO: 27 % (ref 35–47)
HGB BLD-MCNC: 8.1 G/DL (ref 11.7–15.7)
IMM GRANULOCYTES # BLD: ABNORMAL 10*3/UL
IMM GRANULOCYTES NFR BLD: ABNORMAL %
LYMPHOCYTES # BLD AUTO: ABNORMAL 10*3/UL
LYMPHOCYTES # BLD MANUAL: 1.9 10E3/UL (ref 0.8–5.3)
LYMPHOCYTES NFR BLD AUTO: ABNORMAL %
LYMPHOCYTES NFR BLD MANUAL: 32 %
MAGNESIUM SERPL-MCNC: 1.7 MG/DL (ref 1.7–2.3)
MCH RBC QN AUTO: 28.1 PG (ref 26.5–33)
MCHC RBC AUTO-ENTMCNC: 30 G/DL (ref 31.5–36.5)
MCV RBC AUTO: 94 FL (ref 78–100)
MONOCYTES # BLD AUTO: ABNORMAL 10*3/UL
MONOCYTES # BLD MANUAL: 0.8 10E3/UL (ref 0–1.3)
MONOCYTES NFR BLD AUTO: ABNORMAL %
MONOCYTES NFR BLD MANUAL: 14 %
NEUTROPHILS # BLD AUTO: ABNORMAL 10*3/UL
NEUTROPHILS # BLD MANUAL: 2.8 10E3/UL (ref 1.6–8.3)
NEUTROPHILS NFR BLD AUTO: ABNORMAL %
NEUTROPHILS NFR BLD MANUAL: 49 %
NRBC # BLD AUTO: 0 10E3/UL
NRBC # BLD AUTO: 0.1 10E3/UL
NRBC BLD AUTO-RTO: 0 /100
NRBC BLD MANUAL-RTO: 1 %
NT-PROBNP SERPL-MCNC: 2161 PG/ML (ref 0–1800)
PATH REPORT.ADDENDUM SPEC: NORMAL
PATH REPORT.COMMENTS IMP SPEC: NORMAL
PATH REPORT.COMMENTS IMP SPEC: NORMAL
PATH REPORT.FINAL DX SPEC: NORMAL
PATH REPORT.GROSS SPEC: NORMAL
PATH REPORT.MICROSCOPIC SPEC OTHER STN: NORMAL
PATH REPORT.RELEVANT HX SPEC: NORMAL
PHOSPHATE SERPL-MCNC: 2.7 MG/DL (ref 2.5–4.5)
PHOTO IMAGE: NORMAL
PLAT MORPH BLD: ABNORMAL
PLATELET # BLD AUTO: 134 10E3/UL (ref 150–450)
POTASSIUM SERPL-SCNC: 4.1 MMOL/L (ref 3.4–5.3)
RBC # BLD AUTO: 2.88 10E6/UL (ref 3.8–5.2)
RBC MORPH BLD: ABNORMAL
SODIUM SERPL-SCNC: 142 MMOL/L (ref 135–145)
WBC # BLD AUTO: 5.8 10E3/UL (ref 4–11)

## 2024-05-06 PROCEDURE — 250N000009 HC RX 250: Performed by: INTERNAL MEDICINE

## 2024-05-06 PROCEDURE — 120N000005 HC R&B MS OVERFLOW UMMC

## 2024-05-06 PROCEDURE — 80048 BASIC METABOLIC PNL TOTAL CA: CPT | Performed by: INTERNAL MEDICINE

## 2024-05-06 PROCEDURE — 71045 X-RAY EXAM CHEST 1 VIEW: CPT | Mod: 26 | Performed by: STUDENT IN AN ORGANIZED HEALTH CARE EDUCATION/TRAINING PROGRAM

## 2024-05-06 PROCEDURE — 250N000013 HC RX MED GY IP 250 OP 250 PS 637: Performed by: INTERNAL MEDICINE

## 2024-05-06 PROCEDURE — 97161 PT EVAL LOW COMPLEX 20 MIN: CPT | Mod: GP

## 2024-05-06 PROCEDURE — 999N000157 HC STATISTIC RCP TIME EA 10 MIN

## 2024-05-06 PROCEDURE — 97530 THERAPEUTIC ACTIVITIES: CPT | Mod: GP

## 2024-05-06 PROCEDURE — 85007 BL SMEAR W/DIFF WBC COUNT: CPT | Performed by: INTERNAL MEDICINE

## 2024-05-06 PROCEDURE — 99232 SBSQ HOSP IP/OBS MODERATE 35: CPT | Mod: GC | Performed by: INTERNAL MEDICINE

## 2024-05-06 PROCEDURE — 84100 ASSAY OF PHOSPHORUS: CPT | Performed by: INTERNAL MEDICINE

## 2024-05-06 PROCEDURE — 97530 THERAPEUTIC ACTIVITIES: CPT | Mod: GO

## 2024-05-06 PROCEDURE — 94640 AIRWAY INHALATION TREATMENT: CPT

## 2024-05-06 PROCEDURE — 71045 X-RAY EXAM CHEST 1 VIEW: CPT

## 2024-05-06 PROCEDURE — 36415 COLL VENOUS BLD VENIPUNCTURE: CPT | Performed by: INTERNAL MEDICINE

## 2024-05-06 PROCEDURE — 250N000013 HC RX MED GY IP 250 OP 250 PS 637

## 2024-05-06 PROCEDURE — 83880 ASSAY OF NATRIURETIC PEPTIDE: CPT | Performed by: INTERNAL MEDICINE

## 2024-05-06 PROCEDURE — 97116 GAIT TRAINING THERAPY: CPT | Mod: GP

## 2024-05-06 PROCEDURE — 97110 THERAPEUTIC EXERCISES: CPT | Mod: GO

## 2024-05-06 PROCEDURE — 83735 ASSAY OF MAGNESIUM: CPT | Performed by: INTERNAL MEDICINE

## 2024-05-06 PROCEDURE — 85014 HEMATOCRIT: CPT | Performed by: INTERNAL MEDICINE

## 2024-05-06 RX ORDER — FUROSEMIDE 40 MG
40 TABLET ORAL DAILY
Status: DISCONTINUED | OUTPATIENT
Start: 2024-05-06 | End: 2024-05-07 | Stop reason: HOSPADM

## 2024-05-06 RX ORDER — METOPROLOL TARTRATE 25 MG/1
25 TABLET, FILM COATED ORAL 2 TIMES DAILY
Status: DISCONTINUED | OUTPATIENT
Start: 2024-05-06 | End: 2024-05-07 | Stop reason: HOSPADM

## 2024-05-06 RX ADMIN — METOPROLOL TARTRATE 25 MG: 25 TABLET, FILM COATED ORAL at 21:08

## 2024-05-06 RX ADMIN — ALLOPURINOL 100 MG: 100 TABLET ORAL at 09:10

## 2024-05-06 RX ADMIN — ACETAMINOPHEN 975 MG: 325 TABLET, FILM COATED ORAL at 21:07

## 2024-05-06 RX ADMIN — BUDESONIDE 0.5 MG: 0.5 INHALANT RESPIRATORY (INHALATION) at 07:21

## 2024-05-06 RX ADMIN — FLUTICASONE FUROATE AND VILANTEROL TRIFENATATE 1 PUFF: 100; 25 POWDER RESPIRATORY (INHALATION) at 09:11

## 2024-05-06 RX ADMIN — CLOPIDOGREL BISULFATE 75 MG: 75 TABLET ORAL at 09:10

## 2024-05-06 RX ADMIN — ACETAMINOPHEN 975 MG: 325 TABLET, FILM COATED ORAL at 03:08

## 2024-05-06 RX ADMIN — APIXABAN 2.5 MG: 2.5 TABLET, FILM COATED ORAL at 21:08

## 2024-05-06 RX ADMIN — APIXABAN 2.5 MG: 2.5 TABLET, FILM COATED ORAL at 09:10

## 2024-05-06 RX ADMIN — METHOCARBAMOL 500 MG: 500 TABLET ORAL at 09:42

## 2024-05-06 RX ADMIN — LIDOCAINE 4% 2 PATCH: 40 PATCH TOPICAL at 21:08

## 2024-05-06 RX ADMIN — METOPROLOL TARTRATE 50 MG: 25 TABLET, FILM COATED ORAL at 09:11

## 2024-05-06 RX ADMIN — Medication 3 MG: at 21:16

## 2024-05-06 RX ADMIN — FUROSEMIDE 40 MG: 40 TABLET ORAL at 12:44

## 2024-05-06 RX ADMIN — ROSUVASTATIN CALCIUM 10 MG: 10 TABLET, FILM COATED ORAL at 21:08

## 2024-05-06 RX ADMIN — OMEPRAZOLE 40 MG: 20 CAPSULE, DELAYED RELEASE ORAL at 09:10

## 2024-05-06 RX ADMIN — AMLODIPINE BESYLATE 10 MG: 10 TABLET ORAL at 09:11

## 2024-05-06 RX ADMIN — ACETAMINOPHEN 975 MG: 325 TABLET, FILM COATED ORAL at 12:44

## 2024-05-06 NOTE — PLAN OF CARE
"Goal Outcome Evaluation: BP (!) 158/64 (BP Location: Left arm)   Pulse 64   Temp 98.4  F (36.9  C) (Oral)   Resp 16   Ht 1.676 m (5' 6\")   Wt 66.5 kg (146 lb 9.6 oz)   SpO2 97%   BMI 23.66 kg/m       AVSS. No pain no nausea. Voiding well. No prns needed. No replacements needed. Continue with plan of care.     Problem: Adult Inpatient Plan of Care  Goal: Plan of Care Review  Description: The Plan of Care Review/Shift note should be completed every shift.  The Outcome Evaluation is a brief statement about your assessment that the patient is improving, declining, or no change.  This information will be displayed automatically on your shift  note.  Outcome: Progressing  Goal: Patient-Specific Goal (Individualized)  Description: You can add care plan individualizations to a care plan. Examples of Individualization might be:  \"Parent requests to be called daily at 9am for status\", \"I have a hard time hearing out of my right ear\", or \"Do not touch me to wake me up as it startles  me\".  Outcome: Progressing  Goal: Absence of Hospital-Acquired Illness or Injury  Outcome: Progressing  Intervention: Identify and Manage Fall Risk  Recent Flowsheet Documentation  Taken 5/6/2024 0336 by Carmen Knott RN  Safety Promotion/Fall Prevention:   clutter free environment maintained   nonskid shoes/slippers when out of bed   patient and family education   safety round/check completed  Taken 5/6/2024 0200 by Carmen Knott, RN  Safety Promotion/Fall Prevention: safety round/check completed  Taken 5/5/2024 2338 by Carmen Knott, RN  Safety Promotion/Fall Prevention:   clutter free environment maintained   nonskid shoes/slippers when out of bed   patient and family education   safety round/check completed  Intervention: Prevent Skin Injury  Recent Flowsheet Documentation  Taken 5/5/2024 2338 by Carmen Knott, RN  Body Position:   position changed independently   turned   right  Skin Protection:   incontinence pads " utilized   transparent dressing maintained  Device Skin Pressure Protection:   adhesive use limited   tubing/devices free from skin contact  Intervention: Prevent and Manage VTE (Venous Thromboembolism) Risk  Recent Flowsheet Documentation  Taken 5/5/2024 2338 by Carmen Knott RN  VTE Prevention/Management: SCDs (sequential compression devices) off  Intervention: Prevent Infection  Recent Flowsheet Documentation  Taken 5/5/2024 2338 by Carmen Knott RN  Infection Prevention:   environmental surveillance performed   equipment surfaces disinfected   hand hygiene promoted   personal protective equipment utilized   rest/sleep promoted   single patient room provided   visitors restricted/screened  Goal: Optimal Comfort and Wellbeing  Outcome: Progressing  Goal: Readiness for Transition of Care  Outcome: Progressing     Problem: Gastrointestinal Bleeding  Goal: Optimal Coping with Acute Illness  Outcome: Progressing  Intervention: Optimize Psychosocial Response  Recent Flowsheet Documentation  Taken 5/5/2024 2338 by Carmen Knott RN  Supportive Measures: active listening utilized  Goal: Hemostasis  Outcome: Progressing  Intervention: Manage Gastrointestinal Bleeding  Recent Flowsheet Documentation  Taken 5/5/2024 2338 by Carmen Knott RN  Bleeding Management:   dressing monitored   other (see comments)  Environmental Support: calm environment promoted     Problem: Fall Injury Risk  Goal: Absence of Fall and Fall-Related Injury  Outcome: Progressing  Intervention: Identify and Manage Contributors  Recent Flowsheet Documentation  Taken 5/5/2024 2338 by Carmen Knott RN  Self-Care Promotion: independence encouraged  Medication Review/Management: medications reviewed  Intervention: Promote Injury-Free Environment  Recent Flowsheet Documentation  Taken 5/6/2024 0336 by Carmen Knott RN  Safety Promotion/Fall Prevention:   clutter free environment maintained   nonskid shoes/slippers when out of  bed   patient and family education   safety round/check completed  Taken 5/6/2024 0200 by Carmen Knott, RN  Safety Promotion/Fall Prevention: safety round/check completed  Taken 5/5/2024 2338 by Carmen Knott, RN  Safety Promotion/Fall Prevention:   clutter free environment maintained   nonskid shoes/slippers when out of bed   patient and family education   safety round/check completed

## 2024-05-06 NOTE — PLAN OF CARE
"Shift: 5604-1289    BP (!) 145/56 (BP Location: Right arm)   Pulse 68   Temp 96.9  F (36.1  C) (Oral)   Resp 16   Ht 1.676 m (5' 6\")   Wt 64.7 kg (142 lb 11.2 oz)   SpO2 100%   BMI 23.03 kg/m       Summary:  Neuro: A&Ox4. Able to make needs known.  Cardiac: Afebrile, VSS.   Respiratory: RA, denies SOB  GI/: Voiding spontaneously. X1 BM this shift.   Diet/appetite: Tolerating regular diet. Denies nausea   Activity: Up with 1 assist    Pain: Denies   Skin: No new deficits noted.  Lines: R PIV  Replacement: No replacements needed    Possible discharge tomorrow.    Problem: Adult Inpatient Plan of Care  Goal: Plan of Care Review  Description: The Plan of Care Review/Shift note should be completed every shift.  The Outcome Evaluation is a brief statement about your assessment that the patient is improving, declining, or no change.  This information will be displayed automatically on your shift  note.  Outcome: Progressing  Goal: Patient-Specific Goal (Individualized)  Description: You can add care plan individualizations to a care plan. Examples of Individualization might be:  \"Parent requests to be called daily at 9am for status\", \"I have a hard time hearing out of my right ear\", or \"Do not touch me to wake me up as it startles  me\".  Outcome: Progressing  Goal: Absence of Hospital-Acquired Illness or Injury  Outcome: Progressing  Intervention: Identify and Manage Fall Risk  Recent Flowsheet Documentation  Taken 5/6/2024 1200 by Errol Clemens RN  Safety Promotion/Fall Prevention:   clutter free environment maintained   lighting adjusted   nonskid shoes/slippers when out of bed   safety round/check completed  Taken 5/6/2024 0800 by Errol Clemens, RN  Safety Promotion/Fall Prevention:   clutter free environment maintained   nonskid shoes/slippers when out of bed   patient and family education   safety round/check completed  Intervention: Prevent Skin Injury  Recent Flowsheet Documentation  Taken 5/6/2024 1200 " by Errol Clemens RN  Body Position: position changed independently  Taken 5/6/2024 0800 by Errol Clemens RN  Body Position:   position changed independently   turned   right  Intervention: Prevent and Manage VTE (Venous Thromboembolism) Risk  Recent Flowsheet Documentation  Taken 5/6/2024 0800 by Errol Clemens RN  VTE Prevention/Management: SCDs (sequential compression devices) off  Intervention: Prevent Infection  Recent Flowsheet Documentation  Taken 5/6/2024 1200 by Errol Clemens RN  Infection Prevention:   environmental surveillance performed   hand hygiene promoted   rest/sleep promoted   single patient room provided  Taken 5/6/2024 0800 by Errol Clemens RN  Infection Prevention:   environmental surveillance performed   hand hygiene promoted   rest/sleep promoted   single patient room provided  Goal: Optimal Comfort and Wellbeing  Outcome: Progressing  Intervention: Monitor Pain and Promote Comfort  Recent Flowsheet Documentation  Taken 5/6/2024 0800 by Errol Clemens RN  Pain Management Interventions: medication (see MAR)  Goal: Readiness for Transition of Care  Outcome: Progressing     Problem: Gastrointestinal Bleeding  Goal: Optimal Coping with Acute Illness  Outcome: Progressing  Intervention: Optimize Psychosocial Response  Recent Flowsheet Documentation  Taken 5/6/2024 0800 by Errol Clemens RN  Supportive Measures: active listening utilized  Goal: Hemostasis  Outcome: Progressing  Intervention: Manage Gastrointestinal Bleeding  Recent Flowsheet Documentation  Taken 5/6/2024 1200 by Errol Clemens RN  Bleeding Management: dressing monitored  Taken 5/6/2024 0800 by Errol Clemens RN  Environmental Support: calm environment promoted     Problem: Fall Injury Risk  Goal: Absence of Fall and Fall-Related Injury  Outcome: Progressing  Intervention: Identify and Manage Contributors  Recent Flowsheet Documentation  Taken 5/6/2024 1200 by Errol Clemens RN  Medication Review/Management: medications  reviewed  Taken 5/6/2024 0800 by Errol Clemens, RN  Medication Review/Management: medications reviewed  Intervention: Promote Injury-Free Environment  Recent Flowsheet Documentation  Taken 5/6/2024 1200 by Errol Clemens RN  Safety Promotion/Fall Prevention:   clutter free environment maintained   lighting adjusted   nonskid shoes/slippers when out of bed   safety round/check completed  Taken 5/6/2024 0800 by Errol Clemens, RN  Safety Promotion/Fall Prevention:   clutter free environment maintained   nonskid shoes/slippers when out of bed   patient and family education   safety round/check completed

## 2024-05-06 NOTE — PROGRESS NOTES
"[id number]    Vitals: AVSS on RA; ex HTN    BP (!) 170/59   Pulse 70   Temp 98.3  F (36.8  C) (Oral)   Resp 18   Ht 1.676 m (5' 6\")   Wt 66.5 kg (146 lb 9.6 oz)   SpO2 99%   BMI 23.66 kg/m      Mobility: A1, W, GB. Alarm.  Pain: abd discomfort; scheduled tylenol and lidocaine patches  Nausea: denies    Denies dizziness this shift. No BM this shift. Lidocaine patches on abdomin. Calling appropriately; bed alarm on for safety. Up to commode. Melatonin at bedtime.   "

## 2024-05-06 NOTE — PROGRESS NOTES
Care Management Follow Up    Length of Stay (days): 5    Expected Discharge Date: 05/06/2024?     Concerns to be Addressed: discharge planning     Patient plan of care discussed at interdisciplinary rounds: Yes    Anticipated Discharge Disposition: Home with family     Anticipated Discharge Services:  home care: RN, PT, OT  Anticipated Discharge DME: None    Patient/family educated on Medicare website which has current facility and service quality ratings: yes  Education Provided on the Discharge Plan: Yes  Patient/Family in Agreement with the Plan: yes    Referrals Placed by CM/SW: Homecare (via [facility] hub)  Private pay costs discussed: Not applicable    Additional Information:  Writer received a voice message from Supriya Myrick, transitions  at [employer].  Patient has [employer] Complete program.  Patient will receive supportive services via this program, in addition to home care from [facility], starting 5/9/24, per Supriya.  Writer called Supriya back, also got her voicemail.  Writer left message stating writer received Supriya's voicemail and that [facility] referral is confirmed for RN, PT, OT when patient discharges.        [employer]   Supriya Myrick  [employer] [facility] Program  Phone: [phone]        Patient has secured home care as above with [facility] with no delay in start of care.  RNCC to call [facility] RN liaison upon discharge.       Teodora Hall RN BSN   Cell:   [phone]  Email: [email or web address]             [facility]      Address   [address] [place] [id number]             Contact Information    [phone]                  RNCC will continue to follow for discharge planning.           Aurora Wei RN, BSN  RN Care Coordinator    5A Medical Oncology/5C non-BMT  5A beds 4320-7539  5C beds 7591-2239 (non-BMT)  [address] [address]  [email or web address]   [email or web address]  Gender pronouns: she/her  Units: 5A Onc Vocera & 5C Vocera Pager: [phone]

## 2024-05-06 NOTE — PROGRESS NOTES
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 She is hemodynamically stable with stable hemoglobin, and well appearing. She was admitted for EGD (no findings 5/2). She requires admission to resume her anticoagulation and cardiac medications while monitoring for melena and blood pressure.       Today:  - Get orthostatics today   - Resume lasix   - Get CXR and BNP for SOB         Shortness of breath  Short of breath this AM while working with physical therapy. No CP. Resolved with rest.   - CXR and BNP  - Resume pta  lasix       Abdominal pain near incision site   Recent admission (4/2023) for small bowel obstruction with possible ischemia  Evolving postsurgical edema and/or fat necrosis  She was admitted 4/13 to 4/22. She underwent laporoscopy on 4/13 c/b bleeding with return to the OR 4/13/24. Abdominal closure completed 4/14.  CT abdomen/pelvis here notable for \"Postsurgical changes of the abdomen with ventral abdominal wall incision. Immediately deep to the incision and rectus abdominis musculature, there is stranding associated with lobules of fat, likely evolving postsurgical edema and/or fat " "necrosis. Soft tissue  thickening in the transverse mesocolon and small amount of blood products in the right central mesentery, also likely evolving surgical change\". She is not having new abdominal pain. Has previous pain that is present at the superior portion of her abdominal incision.   - Surgery consulted given concern for fat necrosis - nothing to do staples removed   - lidocaine patch        CAD s/p multiple stents (most recent 11/2023)  Sick sinus syndrom s/p dual PPM   Paroxysmal atrial fibrillation  Mild-moderate mitral regurgitation   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  Hypertension, uncontrolled  - Continue pta amlodipine   - resume pta lasix   - hold isorbide mononitrate, likely resume in AM    - pta metoprolol (resumed 5/04)  - hold potassium chloride, on replacement protocol        Acute blood loss anemia secondary to GI bleeding of unclear source with known history of angiodysplastic lesion in ascending colon and diverticulosis  Presented with one day of melana. Admission Hgb 8.1, near baseline 8-10. Normal vitals. Not tachycardic however on beta blocker pta. CT abdomen/pelvis without acute hemorrhage/fluid collection.  EGD 5/01 \"There was patchy nodular mucosa in body of stomach which had mild oozing with irritation. Unlikely to be the cause of melana. Highest on the differential is most likely oozing AVM in the setting of DOAC and antiplatelet therapy. The patient received PPI IV BID on admission. Resumed on her antiplatelets on 5/3  - Continue pta clopidogrel  (resumed 5/03) and apixaban (5/04)  - Continue omeprazole daily  - Avoid NSAIDs  - GI consulted, appreciate recs     Dizziness  Noted 5/04 AM after waking up. No present while moving around, not currently present. Orthostatics negative 5/05 however held off re-strarting lasix 05/05 given the value was very close.   - Check orthostatics again 05/06       Hypomagnesia and hypophosphatemia, not clear if present on admission  - On " replacement protocol     -----------------------Chronic medical problems---------------------------  Gout -  pta allopurinol      HLD  - pta rosuvastatin         Diet: Regular Diet Adult  Snacks/Supplements Adult: Ensure Enlive; With Meals    DVT Prophylaxis: none, brady   Bonner Catheter: Not present  Fluids: none   Lines: None     Cardiac Monitoring: None  Code Status: Full Code      Clinically Significant Risk Factors              # Hypoalbuminemia: Lowest albumin = 3.3 g/dL at 5/4/2024  4:51 AM, will monitor as appropriate     # Thrombocytopenia: Lowest platelets = 110 in last 2 days, will monitor for bleeding   # Hypertension: Noted on problem list  # Chronic heart failure with preserved ejection fraction: heart failure noted on problem list and last echo with EF >50%        # Moderate Malnutrition: based on nutrition assessment    # Financial/Environmental Concerns: none   # Pacemaker present       Disposition Plan     Expected Discharge Date: 05/06/2024      Destination: home with family;home with help/services            Sabrina Torres MD  Medicine Service, [facility] TEAM [address] [place] [address]  Securely message with [employer] Dough (more info)  Text page via [facility] Paging/Directory   See signed in provider for up to date coverage information  ______________________________________________________________________    Interval History   No acute overnight events. Shortness of breath this AM with PT. No chest pain, resolved with rest. No new abdominal pain. Appetite is good. Feels a bit more fatigued.     Physical Exam   Vital Signs: Temp: 97.4  F (36.3  C) Temp src: Oral BP: (!) 155/70 Pulse: 71   Resp: 18 SpO2: 99 % O2 Device: None (Room air)    Weight: 142 lbs 11.2 oz    General: Well appearing, nontoxic  HEENT: MMM, No JVD   CV: RRR, no murmurs heard, extremities perfused, no lower extremity edema   Resp: breathing comfortably on room air, CTAB  Abd: soft, nondistended, mild  tenderness around the abdominal incision. No redness.  No swelling.   Neuro: moving all extremities, upper and lower extremity 5/5    Medical Decision Making       Please see A&P for additional details of medical decision making.      Data   ------------------------- PAST 24 HR DATA REVIEWED -----------------------------------------------

## 2024-05-06 NOTE — PROGRESS NOTES
"   05/06/24 0944   Appointment Info   Signing Clinician's Name / Credentials (PT) Miguelina Vu PT, DPT   Rehab Comments (PT) abdominal precautions   Living Environment   People in Home spouse   Current Living Arrangements house   Home Accessibility stairs to enter home;stairs within home   Number of Stairs, Main Entrance 2   Stair Railings, Main Entrance railings on both sides of stairs   Number of Stairs, Within Home, Primary seven   Stair Railings, Within Home, Primary railings on both sides of stairs   Transportation Anticipated family or friend will provide   Living Environment Comments Pt lives with spouse, spouse present 24/7. Bedroom on upper level, spends majority of time n family room on lower level, spouse brings foot down to patient. bathrooms are nearby   Self-Care   Usual Activity Tolerance moderate   Current Activity Tolerance moderate   Regular Exercise No   Equipment Currently Used at Home walker, rolling;walker, standard;shower chair;wheelchair, manual   Fall history within last six months no   Activity/Exercise/Self-Care Comment Pt reports mobilizing with SBA from spouse, occasionally requires assist x1 to stand, denies falls recently.   General Information   Onset of Illness/Injury or Date of Surgery 05/01/24   Referring Physician Jeancarlos Velásquez MD   Patient/Family Therapy Goals Statement (PT) return home   Pertinent History of Current Problem (include personal factors and/or comorbidities that impact the POC) per EMR \"89 y/o female with medical history significant for chronic anemia, CAD with history of multiple stents (most recently 11/6/2023 LAD PCI) on Apixaban and Clopidogrel, sick sinus syndrome status-post dual-chamber PPM, paroxysmal atrial fibrillation, mild-moderate mitral regurgitation, moderate tricuspid regurgitation, 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 She is hemodynamically stable with stable hemoglobin, and well appearing. She was admitted for EGD (no findings 5/2). She requires admission to resume her anticoagulation and cardiac medications while monitoring for melena and blood pressure.\"   Existing Precautions/Restrictions fall;abdominal   General Observations pleasant, slightly [place], motivated to work with therapy to maintain ind   Cognition   Affect/Mental Status (Cognition) WNL   Pain Assessment   Patient Currently in Pain Yes, see Vital Sign flowsheet   Integumentary/Edema   Integumentary/Edema Comments consistent with procudure   Posture    Posture Forward head position;Kyphosis   Range of Motion (ROM)   Range of Motion ROM is WFL   Strength (Manual Muscle Testing)   Strength Comments generalized weakness   Bed Mobility   Comment, (Bed Mobility) independent   Transfers   Comment, (Transfers) SBA STS from EOB   Gait/Stairs (Locomotion)   Comment, (Gait/Stairs) SBA with 2WW   Balance   Balance Comments impaired standing balance ,UE support needed in standing.   Clinical Impression   Criteria for Skilled Therapeutic Intervention Yes, treatment indicated   PT Diagnosis (PT) impaired functional mobility   Influenced by the following impairments impaired strenght, balance, activiyt tolerance   Functional limitations due to impairments bed mobility, transfers, gait, stairs   Clinical Presentation (PT Evaluation Complexity) stable   Clinical Presentation Rationale clinical judgement   Clinical Decision Making (Complexity) low complexity   Planned Therapy Interventions (PT) balance training;bed mobility training;gait training;home exercise program;neuromuscular re-education;patient/family education;stair training;strengthening;transfer " "training;progressive activity/exercise;risk factor education;home program guidelines   Risk & Benefits of therapy have been explained evaluation/treatment results reviewed;care plan/treatment goals reviewed;risks/benefits reviewed;current/potential barriers reviewed;participants voiced agreement with care plan;participants included;patient;spouse/significant other   PT Total Evaluation Time   PT Eval, Low Complexity Minutes (45797) 7   Physical Therapy Goals   PT Frequency Daily   PT Predicted Duration/Target Date for Goal Attainment 05/31/24   PT Goals Bed Mobility;Transfers;Gait;Stairs   PT: Bed Mobility Independent;Within precautions   PT: Transfers Modified independent;Sit to/from stand;Bed to/from chair;Assistive device   PT: Gait Modified independent;Greater than 200 feet   PT: Stairs Supervision/stand-by assist;7 stairs;Rail on both sides   PT Discharge Planning   PT Plan progress gait distance, stirs as needed, balance/proximal strength   PT Discharge Recommendation (DC Rec) home with assist;home with home care physical therapy   PT Rationale for DC Rec Pt presenting near functional baseline, remains fall risk and with ongoing deconditioning likely worsened by acute hospital stay. Session completed with spouse, pt able to demosntrate SBA household mobility and stairs. Pt and spouse feel comfortable with level of mobility for home, will plan to use w/c at home for longer distances, 2WW in home at all times. Additional assist from children/grandchildren can be available. Pt declined TCU, stating already \"got what she needed from there\" is agreeable to HHPT/OT for ongoing strength, home set up, and fall prevention   PT Brief overview of current status Ax1 with walker   Total Session Time   Timed Code Treatment Minutes 39   Total Session Time (sum of timed and untimed services) 46     "

## 2024-05-06 NOTE — PROVIDER NOTIFICATION
05/06/24 0721   RCAT Assessment   Reason for Assessment Other (see comments)  (Per RCAT protocol)   Pulmonary Status 0   Surgical Status 2   Chest X-ray 0   Respiratory Pattern 0   Mental Status 0   Breath Sounds 0   Cough Effectiveness 0   Level of Activity 1   O2 Required for SpO2>=92% 0   Acuity Level (points) 3   Acuity Level  5   Clinical Indications/Symptoms   Aerosol Therapy RCAT protocol   Aerosol Therapy Plan   RT Treatment Nebulizer   Aerosol Treatment Frequency Acuity Level 4: PRN [id number] wheezing   Anticholinergics Ipratropium soln 0.5mg/2.5mL neb Max 6 doses/24h     Per RCAT protocol, neb discontinued. PRN neb can be ordered by provider for pt if found necessary.    Tiffanie Conway, RT on 5/6/2024 at 9:08 AM

## 2024-05-07 ENCOUNTER — APPOINTMENT (OUTPATIENT)
Dept: OCCUPATIONAL THERAPY | Facility: CLINIC | Age: 89
DRG: 378 | End: 2024-05-07
Payer: COMMERCIAL

## 2024-05-07 ENCOUNTER — APPOINTMENT (OUTPATIENT)
Dept: PHYSICAL THERAPY | Facility: CLINIC | Age: 89
DRG: 378 | End: 2024-05-07
Payer: COMMERCIAL

## 2024-05-07 VITALS
RESPIRATION RATE: 20 BRPM | DIASTOLIC BLOOD PRESSURE: 58 MMHG | WEIGHT: 146.1 LBS | OXYGEN SATURATION: 99 % | SYSTOLIC BLOOD PRESSURE: 144 MMHG | HEART RATE: 73 BPM | BODY MASS INDEX: 23.48 KG/M2 | HEIGHT: 66 IN | TEMPERATURE: 98 F

## 2024-05-07 LAB
ANION GAP SERPL CALCULATED.3IONS-SCNC: 10 MMOL/L (ref 7–15)
BASOPHILS # BLD AUTO: ABNORMAL 10*3/UL
BASOPHILS # BLD MANUAL: 0.2 10E3/UL (ref 0–0.2)
BASOPHILS NFR BLD AUTO: ABNORMAL %
BASOPHILS NFR BLD MANUAL: 3 %
BUN SERPL-MCNC: 23.1 MG/DL (ref 8–23)
CALCIUM SERPL-MCNC: 8.7 MG/DL (ref 8.8–10.2)
CHLORIDE SERPL-SCNC: 106 MMOL/L (ref 98–107)
CREAT SERPL-MCNC: 1.56 MG/DL (ref 0.51–0.95)
DEPRECATED HCO3 PLAS-SCNC: 24 MMOL/L (ref 22–29)
EGFRCR SERPLBLD CKD-EPI 2021: 32 ML/MIN/1.73M2
EOSINOPHIL # BLD AUTO: ABNORMAL 10*3/UL
EOSINOPHIL # BLD MANUAL: 0.4 10E3/UL (ref 0–0.7)
EOSINOPHIL NFR BLD AUTO: ABNORMAL %
EOSINOPHIL NFR BLD MANUAL: 6 %
ERYTHROCYTE [DISTWIDTH] IN BLOOD BY AUTOMATED COUNT: 19 % (ref 10–15)
GLUCOSE SERPL-MCNC: 86 MG/DL (ref 70–99)
HCT VFR BLD AUTO: 28 % (ref 35–47)
HGB BLD-MCNC: 8.5 G/DL (ref 11.7–15.7)
IMM GRANULOCYTES # BLD: ABNORMAL 10*3/UL
IMM GRANULOCYTES NFR BLD: ABNORMAL %
LYMPHOCYTES # BLD AUTO: ABNORMAL 10*3/UL
LYMPHOCYTES # BLD MANUAL: 1.7 10E3/UL (ref 0.8–5.3)
LYMPHOCYTES NFR BLD AUTO: ABNORMAL %
LYMPHOCYTES NFR BLD MANUAL: 27 %
MAGNESIUM SERPL-MCNC: 1.7 MG/DL (ref 1.7–2.3)
MCH RBC QN AUTO: 27.3 PG (ref 26.5–33)
MCHC RBC AUTO-ENTMCNC: 30.4 G/DL (ref 31.5–36.5)
MCV RBC AUTO: 90 FL (ref 78–100)
MONOCYTES # BLD AUTO: ABNORMAL 10*3/UL
MONOCYTES # BLD MANUAL: 0.7 10E3/UL (ref 0–1.3)
MONOCYTES NFR BLD AUTO: ABNORMAL %
MONOCYTES NFR BLD MANUAL: 11 %
NEUTROPHILS # BLD AUTO: ABNORMAL 10*3/UL
NEUTROPHILS # BLD MANUAL: 3.4 10E3/UL (ref 1.6–8.3)
NEUTROPHILS NFR BLD AUTO: ABNORMAL %
NEUTROPHILS NFR BLD MANUAL: 53 %
NRBC # BLD AUTO: 0 10E3/UL
NRBC # BLD AUTO: 0.1 10E3/UL
NRBC BLD AUTO-RTO: 0 /100
NRBC BLD MANUAL-RTO: 1 %
PHOSPHATE SERPL-MCNC: 3 MG/DL (ref 2.5–4.5)
PLAT MORPH BLD: ABNORMAL
PLATELET # BLD AUTO: 125 10E3/UL (ref 150–450)
POTASSIUM SERPL-SCNC: 3.6 MMOL/L (ref 3.4–5.3)
RBC # BLD AUTO: 3.11 10E6/UL (ref 3.8–5.2)
RBC MORPH BLD: ABNORMAL
SODIUM SERPL-SCNC: 140 MMOL/L (ref 135–145)
VARIANT LYMPHS BLD QL SMEAR: PRESENT
WBC # BLD AUTO: 6.4 10E3/UL (ref 4–11)

## 2024-05-07 PROCEDURE — 97535 SELF CARE MNGMENT TRAINING: CPT | Mod: GO

## 2024-05-07 PROCEDURE — 99239 HOSP IP/OBS DSCHRG MGMT >30: CPT | Mod: GC | Performed by: INTERNAL MEDICINE

## 2024-05-07 PROCEDURE — 250N000013 HC RX MED GY IP 250 OP 250 PS 637: Performed by: INTERNAL MEDICINE

## 2024-05-07 PROCEDURE — 85007 BL SMEAR W/DIFF WBC COUNT: CPT | Performed by: INTERNAL MEDICINE

## 2024-05-07 PROCEDURE — 84100 ASSAY OF PHOSPHORUS: CPT | Performed by: INTERNAL MEDICINE

## 2024-05-07 PROCEDURE — 85027 COMPLETE CBC AUTOMATED: CPT | Performed by: INTERNAL MEDICINE

## 2024-05-07 PROCEDURE — 97530 THERAPEUTIC ACTIVITIES: CPT | Mod: GP

## 2024-05-07 PROCEDURE — 80048 BASIC METABOLIC PNL TOTAL CA: CPT | Performed by: INTERNAL MEDICINE

## 2024-05-07 PROCEDURE — 36415 COLL VENOUS BLD VENIPUNCTURE: CPT | Performed by: INTERNAL MEDICINE

## 2024-05-07 PROCEDURE — 97530 THERAPEUTIC ACTIVITIES: CPT | Mod: GO

## 2024-05-07 PROCEDURE — 83735 ASSAY OF MAGNESIUM: CPT | Performed by: INTERNAL MEDICINE

## 2024-05-07 RX ORDER — ISOSORBIDE MONONITRATE 20 MG/1
60 TABLET ORAL 2 TIMES DAILY
Status: DISCONTINUED | OUTPATIENT
Start: 2024-05-07 | End: 2024-05-07 | Stop reason: HOSPADM

## 2024-05-07 RX ORDER — METHOCARBAMOL 500 MG/1
500 TABLET, FILM COATED ORAL EVERY 6 HOURS PRN
Qty: 30 TABLET | Refills: 0 | Status: SHIPPED | OUTPATIENT
Start: 2024-05-07

## 2024-05-07 RX ORDER — METOPROLOL TARTRATE 25 MG/1
25 TABLET, FILM COATED ORAL 2 TIMES DAILY
Qty: 60 TABLET | Refills: 0 | Status: SHIPPED | OUTPATIENT
Start: 2024-05-07 | End: 2024-06-12

## 2024-05-07 RX ADMIN — FLUTICASONE FUROATE AND VILANTEROL TRIFENATATE 1 PUFF: 100; 25 POWDER RESPIRATORY (INHALATION) at 09:46

## 2024-05-07 RX ADMIN — APIXABAN 2.5 MG: 2.5 TABLET, FILM COATED ORAL at 09:37

## 2024-05-07 RX ADMIN — ALLOPURINOL 100 MG: 100 TABLET ORAL at 09:37

## 2024-05-07 RX ADMIN — ACETAMINOPHEN 975 MG: 325 TABLET, FILM COATED ORAL at 04:10

## 2024-05-07 RX ADMIN — CLOPIDOGREL BISULFATE 75 MG: 75 TABLET ORAL at 09:37

## 2024-05-07 RX ADMIN — METOPROLOL TARTRATE 25 MG: 25 TABLET, FILM COATED ORAL at 09:37

## 2024-05-07 RX ADMIN — AMLODIPINE BESYLATE 10 MG: 10 TABLET ORAL at 09:37

## 2024-05-07 RX ADMIN — OMEPRAZOLE 40 MG: 20 CAPSULE, DELAYED RELEASE ORAL at 09:37

## 2024-05-07 RX ADMIN — FUROSEMIDE 40 MG: 40 TABLET ORAL at 09:37

## 2024-05-07 ASSESSMENT — ACTIVITIES OF DAILY LIVING (ADL)
ADLS_ACUITY_SCORE: 35
ADLS_ACUITY_SCORE: 34
ADLS_ACUITY_SCORE: 35
ADLS_ACUITY_SCORE: 35
ADLS_ACUITY_SCORE: 34
ADLS_ACUITY_SCORE: 35
ADLS_ACUITY_SCORE: 34
ADLS_ACUITY_SCORE: 35
ADLS_ACUITY_SCORE: 34

## 2024-05-07 NOTE — PROGRESS NOTES
"Care Management Discharge Note    Discharge Date: 05/07/2024       Discharge Disposition: Home with family    Discharge Services:  home care: RN, PT, OT    Discharge DME: None    Discharge Transportation: family or friend will provide    Private pay costs discussed: Not applicable    Does the patient's insurance plan have a 3 day qualifying hospital stay waiver?  No    PAS Confirmation Code: n/a  Patient/family educated on Medicare website which has current facility and service quality ratings: yes    Education Provided on the Discharge Plan: Yes  Persons Notified of Discharge Plans: RAUL bradford, Teodora Hall, ph: [phone]   Patient/Family in Agreement with the Plan: yes    Handoff Referral Completed: Yes    Additional Information:  Per H&P:  \"Tessa is a 88 y/o female with medical history significant for chronic anemia, CAD with history of multiple stents (most recently 11/6/2023 LAD PCI) on Apixaban and Clopidogrel, sick sinus syndrome status-post dual-chamber PPM, paroxysmal atrial fibrillation, mild-moderate mitral regurgitation, moderate tricuspid regurgitation, 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 She is hemodynamically stable with stable hemoglobin, and well appearing. She is being admitted with GI consult for EGD in the morning.\"       [facility]   Supriya Myrick  [facility]  Phone: [phone]  Supriya confirmed yesterday patient's first visit with them will be 5/9/24.        Teodora Hall RN BSN   Cell:   [phone]  Email: [email or web address]        [facility]    "   Address   [address] [place] [id number]             Contact Information    [phone]                 Aurora Wei, RN, BSN  RN Care Coordinator    5A Medical Oncology/5C non-BMT  5A beds [id number]   beds [phone] (non-BMT)  [address] [address]  [email or web address]  Gender pronouns: she/her  Units: 5A Onc Vocera & 5C Vocera Pager: [phone]

## 2024-05-07 NOTE — DISCHARGE SUMMARY
DISCHARGE   Discharged to: Home  Via: Automobile  Accompanied by: Spouse  Discharge Instructions: diet, activity, medications, follow up appointments, when to call the MD, and what to watchout for (i.e. s/s of infection, increasing SOB, palpitations, chest pain, bloody stool), all reviewed with patient.  Prescriptions: To be filled by [facility] per pt's request; medication list reviewed & sent with pt  Follow Up Appointments: arranged; information given  Belongings: All sent with pt  IV: PIV removed, cath intact  Pt exhibits understanding of above discharge instructions; all questions answered.  Discharge Paperwork: faxed

## 2024-05-07 NOTE — DISCHARGE INSTRUCTIONS
- Monitor your stools. IF you start having black stools again, please return to the emergency room   - Please make an appointment to see your heart doctor   - continue all of your heart medicines   - We decreased your metoprolol so after one week (starting 5/14) you can go back to taking 50mg twice a day   - continue working with physical therapy and occupational therapy

## 2024-05-07 NOTE — PLAN OF CARE
Occupational Therapy Discharge Summary    Reason for therapy discharge:    All goals and outcomes met, no further needs identified.    Progress towards therapy goal(s). See goals on Care Plan in [facility] electronic health record for goal details.  Goals met    Therapy recommendation(s):    Continued therapy is recommended.  Rationale/Recommendations:  Recommend follow up with  OT/PT to continue to progress functional strength, endurance, safety and IND w/ ADL and mobility within precautions in home env.

## 2024-05-07 NOTE — PLAN OF CARE
"1900 - 0730 Shift Summary:      Plan is to discharge today.    Most Recent Vitals:  BP (!) 141/65 (BP Location: Left arm)   Pulse 66   Temp 97.4  F (36.3  C) (Oral)   Resp 16   Ht 1.676 m (5' 6\")   Wt 64.7 kg (142 lb 11.2 oz)   SpO2 98%   BMI 23.03 kg/m    Body mass index is 23.03 kg/m .    Pain: None.    Neuro: AOx4.  HEENT: WDL.  Resp: spO2 >92% on RA.  Cardiac: HTN; Sx Aware.  Diet: Regular.  GI: No BM during shift.  : Voiding adequately.  Skin: Generalized ecchymosis.  Activity: x1GBW. Bed Alarm on for Patient Safety.  Lines:   RUE PIV: Saline Locked.    Replacements: None.      Problem: Adult Inpatient Plan of Care  Goal: Absence of Hospital-Acquired Illness or Injury  Intervention: Identify and Manage Fall Risk  Recent Flowsheet Documentation  Taken 5/7/2024 0000 by Ruby Mehta RN  Safety Promotion/Fall Prevention: safety round/check completed  Taken 5/6/2024 2000 by Ruby Mehta RN  Safety Promotion/Fall Prevention: safety round/check completed     Problem: Adult Inpatient Plan of Care  Goal: Absence of Hospital-Acquired Illness or Injury  Intervention: Prevent Skin Injury  Recent Flowsheet Documentation  Taken 5/7/2024 0000 by Ruby Mehta RN  Body Position: position changed independently  Taken 5/6/2024 2000 by Ruby Mehta RN  Body Position: position changed independently     Problem: Adult Inpatient Plan of Care  Goal: Absence of Hospital-Acquired Illness or Injury  Intervention: Prevent Infection  Recent Flowsheet Documentation  Taken 5/7/2024 0000 by Ruby Mehta RN  Infection Prevention:   environmental surveillance performed   hand hygiene promoted   rest/sleep promoted   single patient room provided  Taken 5/6/2024 2000 by Ruby Mehta RN  Infection Prevention:   environmental surveillance performed   hand hygiene promoted   rest/sleep promoted   single patient room provided     Problem: Adult Inpatient Plan of Care  Goal: Optimal Comfort and Wellbeing  Intervention: " Monitor Pain and Promote Comfort  Recent Flowsheet Documentation  Taken 5/6/2024 2210 by Ruby Mehta RN  Pain Management Interventions: medication (see MAR)     Problem: Fall Injury Risk  Goal: Absence of Fall and Fall-Related Injury  Intervention: Identify and Manage Contributors  Recent Flowsheet Documentation  Taken 5/7/2024 0000 by Ruby Mehta RN  Medication Review/Management: medications reviewed  Taken 5/6/2024 2000 by Ruby Mehta RN  Medication Review/Management: medications reviewed     Problem: Fall Injury Risk  Goal: Absence of Fall and Fall-Related Injury  Intervention: Promote Injury-Free Environment  Recent Flowsheet Documentation  Taken 5/7/2024 0000 by Ruby Mehta RN  Safety Promotion/Fall Prevention: safety round/check completed  Taken 5/6/2024 2000 by Ruby Mehta RN  Safety Promotion/Fall Prevention: safety round/check completed       Goal Outcome Evaluation:      Plan of Care Reviewed With: patient    Overall Patient Progress: improvingOverall Patient Progress: improving

## 2024-05-07 NOTE — PLAN OF CARE
Physical Therapy Discharge Summary    Reason for therapy discharge:    Discharged to home with home therapy.    Progress towards therapy goal(s). See goals on Care Plan in [facility] electronic health record for goal details.  Goals partially met.  Barriers to achieving goals:   discharge from facility.    Therapy recommendation(s):    Continued therapy is recommended.  Rationale/Recommendations:  skill PT for ongoing strength, endurance, and fall prevention.

## 2024-05-07 NOTE — DISCHARGE SUMMARY
[facility]  Discharge Summary - Medicine & Pediatrics       Date of Admission:  5/1/2024  Date of Discharge:  5/7/2024  2:26 PM  Discharging Provider: Dr. 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 and diverticulosis   Shortness of breath  Hypomagnesia and hypophosphatemia  Abdominal pain near incision site   Evolving postsurgical edema and/or fat necrosis    Recent admission (4/2023) for small bowel obstruction with possible ischemia  CAD s/p multiple stents (most recent 11/2023)  Sick sinus syndrom s/p dual PPM   Paroxysmal atrial fibrillation  Mild-moderate mitral regurgitation   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 If you don't have an appointment   please call and make one.       Appointments on [place] and/or [facility] (with [facility] or [facility]   provider or service). Call [phone] if you haven't heard regarding   these appointments within 7 days of discharge.            Unresulted Labs Ordered in the Past 30 Days of this Admission       Date and Time Order Name Status Description    4/13/2024 11:19 AM Prepare red blood cells (unit) Preliminary     4/13/2024 11:19 AM Prepare red blood cells (unit) Preliminary           Discharge Disposition   Discharged to home  Condition at discharge: Stable    Hospital Course     88 y/o female with medical history significant for chronic anemia, CAD with history of multiple stents (most recently 11/6/2023 LAD PCI) on Apixaban and Clopidogrel, sick sinus syndrome status-post dual-chamber PPM, paroxysmal  "atrial fibrillation, mild-moderate mitral regurgitation, moderate tricuspid regurgitation, 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 hypertensive nephrosclerosis, hypertension, chronic cough, history of bleeding colonic angiodysplastic lesion in the ascending colon treated with clips, colonic polyps, diverticulosis, recent admission 4/13-4/22/2024 for small bowel obstruction status-post exploratory laparotomy x3. She was admitted on 5/01 for evaluation of melana. GI was consulted. Admission Hgb 8.1, near baseline 8-10. She received 1unit pRBC for hgb ~7.1. Normal vitals. Not tachycardic however on beta blocker pta. CT abdomen/pelvis without acute hemorrhage/fluid collection.  EGD 5/01 \"There was patchy nodular mucosa in body of stomach which had mild oozing with irritation. Unlikely to be the cause of melana. Highest on the differential is most likely oozing AVM in the setting of DOAC and antiplatelet therapy. The patient received PPI IV on admission and was transitioned to PO PPI. Resumed clopidogrel 5/03 and apixaban 05/04. No bleeding was noted after resumption. Hgb at discharge: 8.5  - continue omeprazole daily   - Avoid NSAIDS  - If there is another episode of bleeding, there should be a risk benefit discussion regarding bleeding on DOAC and antiplatelet therapy     Other concerns:   - Patient's cardiac meds were held on admission given melana. Patient had SOB on 5/06 with CXR with trace pleural effusion likely in the setting of holding cardiac meds. No pulmonary edema. Clear lungs, did not need oxygen. Her BNP was decreased from prior. This was resolved at the time of discharge. Continue lasix, amlodipine, isosorbide mononitrate, and metoprolol   - She had abdominal pain at the incision site. CT abdomen/pelvis here notable for \"Postsurgical changes of the abdomen with ventral abdominal wall incision. Immediately deep to the incision and rectus abdominis musculature, there is stranding associated with " "lobules of fat, likely evolving postsurgical edema and/or fat necrosis. Soft tissue thickening in the transverse mesocolon and small amount of blood products in the right central mesentery, also likely evolving surgical change\". She is not having new abdominal pain. Has previous pain that is present at the superior portion of her abdominal incision. Surgery consulted given concern for fat necrosis - nothing to do staples removed. Managed with lidocaine patch and prn tylenol  - Patient had electrolyte abnormalities inpatient (hypomagnesia and hypophosphatemia), resolved with repletion    Consultations This Hospital Stay   GI LUMINAL ADULT IP CONSULT  SURGERY GENERAL ADULT IP CONSULT  CARE MANAGEMENT / SOCIAL WORK IP CONSULT  NURSING TO CONSULT FOR VASCULAR ACCESS CARE IP CONSULT  NURSING TO CONSULT FOR VASCULAR ACCESS CARE IP CONSULT  PHYSICAL THERAPY ADULT IP CONSULT  OCCUPATIONAL THERAPY ADULT IP CONSULT  PHYSICAL THERAPY ADULT IP CONSULT    Code Status   Full Code       The patient was discussed with Dr. Neal Torres MD  [facility] [address] [facility] [address] [address]  Phone: [phone]  Fax: [phone]  ______________________________________________________________________    Physical Exam   Vital Signs: Temp: 98  F (36.7  C) Temp src: Oral BP: (!) 144/58 Pulse: 73   Resp: 20 SpO2: 99 % O2 Device: None (Room air)    Weight: 146 lbs 1.6 oz    General: Well appearing, nontoxic  HEENT: MMM, No JVD   CV: RRR, no murmurs heard, extremities perfused, no lower extremity edema   Resp: breathing comfortably on room air, CTAB  Abd: soft, nondistended, nontender, non-distended. No redness.  No swelling.   Neuro: moving all extremities, upper and lower extremity 5/5      Primary Care Physician   Pedro Gomez    Discharge Orders      Home Care Referral      Physical Therapy  Referral      Occupational Therapy  Referral    "   Reason for your hospital stay    You were in the hospital for having black stools. We did not find a source. We think you may have an abnormal vessel that makes it easier to bleed when you are on two blood thinning medications.     Activity    Your activity upon discharge: activity as tolerated     Follow Up ([facility])    - Follow up with your heart doctor. If you don't have an appointment please call and make one.       Appointments on [place] and/or [facility] (with [facility] or [facility] provider or service). Call [phone] if you haven't heard regarding these appointments within 7 days of discharge.     Diet    Follow this diet upon discharge: Orders Placed This Encounter      Snacks/Supplements Adult: Ensure Enlive; With Meals      Regular Diet Adult       Significant Results and Procedures   Most Recent 3 CBC's:  Recent Labs   Lab Test 05/07/24  0435 05/06/24  0435 05/05/24  0558   WBC 6.4 5.8 7.0   HGB 8.5* 8.1* 8.3*   MCV 90 94 90   * 134* 110*     Most Recent 3 BMP's:  Recent Labs   Lab Test 05/07/24  0435 05/06/24  0435 05/05/24  0558    142 145   POTASSIUM 3.6 4.1 4.0   CHLORIDE 106 109* 111*   CO2 24 22 23   BUN 23.1* 19.5 20.0   CR 1.56* 1.39* 1.54*   ANIONGAP 10 11 11   ALEXANDRO 8.7* 8.5* 8.6*   GLC 86 98 104*     Most Recent 2 LFT's:  Recent Labs   Lab Test 05/05/24  0558 05/04/24  0451   AST 30 33   ALT 17 15   ALKPHOS 70 58   BILITOTAL 0.3 0.7     Most Recent 3 INR's:  Recent Labs   Lab Test 05/02/24  0449 05/01/24  1828 05/01/24  1101   INR 1.30* 1.33* 1.41*     Most Recent INR's and Anticoagulation Dosing History:  Anticoagulation Dose History  More data exists         Latest Ref Rng & Units 7/19/2023 7/20/2023 11/6/2023 4/13/2024 4/14/2024 5/1/2024 5/2/2024   Recent Dosing and Labs   INR 0.85 - 1.15 1.42  1.45  1.22  1.81  1.59  1.33  1.41  1.30      Most Recent 3 Creatinines:  Recent Labs   Lab Test 05/07/24  0435 05/06/24  0435 05/05/24  0558   CR 1.56* 1.39* 1.54*     Most Recent 3  Hemoglobins:  Recent Labs   Lab Test 05/07/24  0435 05/06/24  0435 05/05/24  0558   HGB 8.5* 8.1* 8.3*     Most Recent 3 Troponin's:No lab results found.  Most Recent 3 BNP's:  Recent Labs   Lab Test 05/06/24  0435 03/17/24  0211 06/28/23  1558   NTBNPI 2,161* 2,497*  --    NTBNP  --   --  1,423     Most Recent D-dimer:No lab results found.  Most Recent Cholesterol Panel:  Recent Labs   Lab Test 11/20/23  0949   CHOL 105   LDL 36   HDL 44*   TRIG 124     7-Day Micro Results       Collected Updated Procedure Result Status      05/01/2024 1907 05/01/2024 2113 C. difficile Toxin B PCR with reflex to C. difficile Antigen and Toxins A/B EIA [[id number]]    Stool from Per Rectum    Final result Component Value   C Difficile Toxin B by PCR Negative   A negative result does not exclude actual disease due to C. difficile and may be due to improper collection, handling and storage of the specimen or the number of organisms in the specimen is below the detection limit of the assay.                  Most Recent TSH and T4:  Recent Labs   Lab Test 09/29/21  1555   TSH 3.28     Most Recent Hemoglobin A1c:No lab results found.  Most Recent 6 glucoses:  Recent Labs   Lab Test 05/07/24  0435 05/06/24  0435 05/05/24  0558 05/04/24  0451 05/02/24  0449 05/01/24  1101   GLC 86 98 104* 96 91 93     Most Recent Urinalysis:  Recent Labs   Lab Test 03/17/24  0353 08/08/22  1715   COLOR Light Yellow Yellow   APPEARANCE Clear Clear   URINEGLC Negative Negative   URINEBILI Negative Negative   URINEKETONE Negative Negative   SG 1.011 1.010   UBLD Moderate* Negative   URINEPH 5.5 5.5   PROTEIN 70* Negative   UROBILINOGEN  --  0.2   NITRITE Positive* Negative   LEUKEST Trace* Negative   RBCU 19* 0-2   WBCU 15* 0-5     Most Recent ABG:  Recent Labs   Lab Test 04/15/24  1051   PH 7.33*   PO2 51*   PCO2 50*   HCO3 26   BHARTI -0.3     Most Recent ESR & CRP:  Recent Labs   Lab Test 03/17/24  0338 10/01/21  1019   CRP  --  3.5   CRPI 41.00*  --       Most Recent Anemia Panel:  Recent Labs   Lab Test 05/07/24  0435 05/02/24  0614 05/01/24  1828 05/01/24  1101   WBC 6.4   < > 5.4 5.5   HGB 8.5*   < > 8.2* 8.1*   HCT 28.0*   < > 26.2* 26.1*   MCV 90   < > 88 88   *   < > 172 189   IRON  --   --   --  67   IRONSAT  --   --   --  28   RETICABSCT  --   --  0.087 0.091   RETP  --   --  2.9* 3.0*   FEB  --   --   --  236*   EMILY  --   --   --  264   B12  --   --   --  3,761*   FOLIC  --   --  28.3  --     < > = values in this interval not displayed.     Most Recent CPK:  Recent Labs   Lab Test 03/17/24  0338   CKT 90   ,   Results for orders placed or performed during the hospital encounter of 05/01/24   CT Abdomen Pelvis w/o Contrast    Narrative    EXAMINATION: CT ABDOMEN PELVIS W/O CONTRAST, 5/1/2024 11:46 AM    INDICATION: abd pain, recent surgery for SBO, now having melena in  addition to pain    COMPARISON STUDY: CT 4/13/2024    TECHNIQUE: CT scan of the abdomen and pelvis was performed on  multidetector CT scanner using volumetric acquisition technique and  images were reconstructed in multiple planes with variable thickness  and reviewed on dedicated workstations.     CONTRAST: Without contrast.    CT scan radiation dose is optimized to minimum requisite dose using  automated dose modulation techniques.    FINDINGS:    Lower thorax: Linear fibroatelectasis in the lung bases.    Liver: No mass. No intrahepatic biliary ductal dilation.    Biliary System: Status post cholecystectomy. No extrahepatic biliary  ductal dilation.    Pancreas: No mass or pancreatic ductal dilation.    Adrenal glands: No mass or nodules    Spleen: Normal.    Kidneys: Atrophic appearance of the kidneys. No obstructing calculus  or hydronephrosis.    Gastrointestinal tract: No abnormally dilated loops of bowel.  Extensive colonic diverticulosis without evidence of acute  diverticulitis. Endoscopy clip in the ascending colon at the level of  the ileocecal valve.      Mesentery/peritoneum/retroperitoneum: No free air. Trace free fluid in  the pelvis. Small amount of intermediately attenuating fluid adjacent  to loops of small bowel in the right central mesentery (series 5 image  92), likely postsurgical. Soft tissue thickening in the transverse  mesocolon. In the anterior abdomen, deep to the ventral abdominal wall  incision and mid to upper rectus abdominis musculature, there is fat  stranding with focal encapsulated fatty focus with a peripheral  hyperdense halo (series 5 image 64).    Lymph nodes: No significant lymphadenopathy.    Vasculature: Nonaneurysmal abdominal aorta with moderate aortobiiliac  atherosclerotic calcifications. Infrarenal IVC filter.    Pelvis: Urinary bladder is unremarkable.  The pelvis is partially  obscured by metallic streak artifact.    Osseous structures: No aggressive or acute osseous lesion.  Bilateral  hip arthroplasties.      Soft tissues: Postsurgical changes with ventral abdominal wall  laparotomy defect.  Small fat-containing periumbilical hernia.      Impression    IMPRESSION:   1. Postsurgical changes of the abdomen with ventral abdominal wall  incision. Immediately deep to the incision and rectus abdominis  musculature, there is stranding associated with lobules of fat, likely  evolving postsurgical edema and/or fat necrosis. Soft tissue  thickening in the transverse mesocolon and small amount of blood  products in the right central mesentery, also likely evolving  postsurgical change. No convincing evidence of acute hemorrhage or  drainable fluid collection on this noncontrast CT.  2. No abnormally dilated loops of bowel. No evidence of recurrent  bowel obstruction.  3. Trace free fluid in the pelvis. No free air.    I have personally reviewed the examination and initial interpretation  and I agree with the findings.    KATHERIN COSTELLO,          SYSTEM ID:  [id number]   XR Chest Port 1 View    Narrative    EXAM: XR CHEST PORT 1 VIEW,  5/6/2024 2:46 PM     HISTORY: SOB       COMPARISON: 4/16/2024    FINDINGS:   Frontal view of the chest. Left costophrenic angle is collimated out  of view. Left chest wall pacemaker. Coronary stents. Stable cardiac  silhouette. Aortic arch atherosclerosis. No focal consolidation.  Possible trace right pleural effusion. No pneumothorax. Left  costophrenic angle is not completely included.      Impression    IMPRESSION: No acute airspace opacity. Possible trace right pleural  effusion.    I have personally reviewed the examination and initial interpretation  and I agree with the findings.    AURELIO PETER MD         SYSTEM ID:  [id number]     *Note: Due to a large number of results and/or encounters for the requested time period, some results have not been displayed. A complete set of results can be found in Results Review.       Discharge Medications   Discharge Medication List as of 5/7/2024 12:23 PM        CONTINUE these medications which have CHANGED    Details   methocarbamol (ROBAXIN) 500 MG tablet Take 1 tablet (500 mg) by mouth every 6 hours as needed for muscle spasms, Disp-30 tablet, R-0, E-Prescribe      metoprolol tartrate (LOPRESSOR) 25 MG tablet Take 1 tablet (25 mg) by mouth 2 times daily for 30 days, Disp-60 tablet, R-0, E-PrescribeContinue 25mg BID for one more week then you can resume your 50mg twice a day on 5/14/2024           CONTINUE these medications which have NOT CHANGED    Details   acetaminophen (TYLENOL) 325 MG tablet Take 975 mg by mouth nightly as needed for pain, Historical      allopurinol (ZYLOPRIM) 100 MG tablet Take 1 tablet (100 mg) by mouth daily, Disp-90 tablet, R-0, E-Prescribe      amLODIPine (NORVASC) 10 MG tablet Take 1 tablet (10 mg) by mouth daily, Disp-90 tablet, R-3, E-Prescribe      apixaban ANTICOAGULANT (ELIQUIS) 2.5 MG tablet Take 1 tablet (2.5 mg) by mouth 2 times daily, Disp-180 tablet, R-3, E-Prescribe      budesonide (PULMICORT) 0.5 MG/2ML neb solution Take 2 mLs  (0.5 mg) by nebulization 2 times daily, Disp-2 mL, R-3, E-Prescribe      clopidogrel (PLAVIX) 75 MG tablet Take 1 tablet (75 mg) by mouth daily, Disp-90 tablet, R-1, E-Prescribe      ferrous sulfate (FEROSUL) 325 (65 Fe) MG tablet Take 1 tablet by mouth 4 times weekly on Sunday, Tuesday, Thursday, and Saturday., Historical      fluticasone-salmeterol (ADVAIR) 250-50 MCG/ACT inhaler INHALE 1 DOSE BY MOUTH TWICE DAILY, Disp-60 each, R-8, E-Prescribe      furosemide (LASIX) 40 MG tablet Take 40 mg by mouth daily May take an additional 40 mg at noon as needed for swelling., Historical      isosorbide mononitrate (ISMO/MONOKET) 20 MG tablet Take 60 mg by mouth 2 times daily, Historical      Multiple Vitamins-Minerals (PRESERVISION AREDS 2+MULTI VIT PO) Take 1 capsule by mouth 2 times daily, Historical      omeprazole (PRILOSEC) 40 MG DR capsule Take 1 capsule (40 mg) by mouth daily, Disp-90 capsule, R-3, E-Prescribe      potassium chloride ER (K-TAB/KLOR-CON) 10 MEQ CR tablet Take 1 tablet (10 mEq) by mouth daily, Disp-90 tablet, R-3, E-Prescribe      psyllium (METAMUCIL) 28.3 % packet Take 1 packet by mouth daily as needed for constipation, Historical      rosuvastatin (CRESTOR) 20 MG tablet Take 1 tablet (20 mg) by mouth daily for 360 days, Disp-90 tablet, R-3, E-Prescribe      timolol maleate (TIMOPTIC) 0.5 % ophthalmic solution INSTILL 1 DROP INTO EACH EYE ONCE DAILY IN THE MORNING, Historical      vitamin B-12 (CYANOCOBALAMIN) 500 MCG tablet Take 1 tablet by mouth daily, Historical      vitamin D3 25 mcg (1000 units) tablet Take 1 tablet (25 mcg) by mouth every other day, Disp-90 tablet, R-3, E-Prescribe           Allergies   Allergies   Allergen Reactions    Codeine Sulfate Itching    Shrimp Swelling    Bactrim [Sulfamethoxazole W/Trimethoprim]      Patient unable to recall    Biaxin [Clarithromycin]     Chlorthalidone Nausea and Vomiting    Clonidine     Darvon [Propoxyphene Hcl]     Dilaudid [Hydromorphone]  "Visual Disturbance and Hallucination     Tolerated in April 2024 hospital admissions    Gabapentin Fatigue and Confusion     \"felt drunk\"    Indomethacin     Levaquin [Levofloxacin Hemihydrate]     Morphine Sulfate     Percocet [Oxycodone-Acetaminophen] Hallucination    Pregabalin Itching and Fatigue    Simvastatin Palpitations     Muscle weakness, leg cramping      Spironolactone      Dehydrated      Terazosin      "

## 2024-05-08 NOTE — TELEPHONE ENCOUNTER
Patient called in today stating she just got released from Hospital and she is not strong enough to do out of home therapy please advise.

## 2024-05-08 NOTE — TELEPHONE ENCOUNTER
COMMENTS:  Most recent hematocrit and reticulocyte count on (10/3) of 35 % and 7.4% respectively. Receiving MVI daily.     PLANS:  - Continue MVI  - Repeat hematology labs in 2 weeks from previous (ordered 10/17)   Spoke with Thuy TURPIN. States a new referral order needs to be placed since patient was admitted. Will have clinic staff follow up with Thuy to clarify further.

## 2024-05-09 ENCOUNTER — TELEPHONE (OUTPATIENT)
Dept: FAMILY MEDICINE | Facility: CLINIC | Age: 89
End: 2024-05-09
Payer: COMMERCIAL

## 2024-05-09 NOTE — TELEPHONE ENCOUNTER
[facility] Call Center    Phone Message    May a detailed message be left on voicemail: no     Reason for Call: Other: Columba from [facility] is in need of a delay order for skilled nursing. Pt is being seen on 5/10 which is outside of the 48 hour window. Please review and call Columba to discuss.     Action Taken: Other: [facility]    Travel Screening: Not Applicable

## 2024-05-09 NOTE — TELEPHONE ENCOUNTER
Left detailed VM on secure voicemail box for Columba MEDINA with the following verbal orders: delay order for skilled nursing to 5/10/2024.  Jordyn DUQUE LPN  [facility]

## 2024-05-10 ENCOUNTER — DOCUMENTATION ONLY (OUTPATIENT)
Dept: FAMILY MEDICINE | Facility: CLINIC | Age: 89
End: 2024-05-10
Payer: COMMERCIAL

## 2024-05-10 ENCOUNTER — MEDICAL CORRESPONDENCE (OUTPATIENT)
Dept: HEALTH INFORMATION MANAGEMENT | Facility: CLINIC | Age: 89
End: 2024-05-10

## 2024-05-10 NOTE — PROGRESS NOTES
Type of Form Received:     Form Received (Date) 5/10/24   Form Filled out Yes, faxed 5/20/24   Placed in provider folder Yes

## 2024-05-13 ENCOUNTER — TELEPHONE (OUTPATIENT)
Dept: FAMILY MEDICINE | Facility: CLINIC | Age: 89
End: 2024-05-13
Payer: COMMERCIAL

## 2024-05-13 ENCOUNTER — TELEPHONE (OUTPATIENT)
Dept: CARDIOLOGY | Facility: CLINIC | Age: 89
End: 2024-05-13
Payer: COMMERCIAL

## 2024-05-13 ENCOUNTER — PATIENT OUTREACH (OUTPATIENT)
Dept: CARE COORDINATION | Facility: CLINIC | Age: 89
End: 2024-05-13
Payer: COMMERCIAL

## 2024-05-13 NOTE — TELEPHONE ENCOUNTER
----- Message from Thor Muniz CMA sent at 5/13/2024  9:25 AM CDT -----    ----- Message -----  From: Tanya Curry RN  Sent: 5/13/2024   9:15 AM CDT  To: [facility]    Hi!  Patient was recently hospitalized and needing a follow up with Cardiology regarding blood thinners for concerns of bleeding/blood in stools. Patient saw Dr. Santoyo in January and pt will be seeing PCP tomorrow, but does not have any follow-up scheduled with Cardiology- I was wondering if you could call her to help schedule?  Thanks!  Tanya Martínez, CAROL Care Manager  Primary Care Clinic   Phone: [phone]  Fax: [phone]

## 2024-05-13 NOTE — TELEPHONE ENCOUNTER
[facility] Call Center    Phone Message    May a detailed message be left on voicemail: yes     Reason for Call: Order(s): Home Care Orders: Other: P/T and O/T to [facility] and then Skilled Nursing 1x a week for 2 weeks followed by 1x a week every other week for 4 weeks with 3 prn's education, and disease management, monitoring her incision and steri strips for 30 days.  Start of Care was Friday May 10th.        Action Taken: Message routed to:  [facility]    Travel Screening: Not Applicable

## 2024-05-13 NOTE — TELEPHONE ENCOUNTER
Patient confirmed scheduled appointment:  Date: 5/16  Time: 3:00   Visit type: return cardiology   Provider: jake brewer  Location: [facility]  Testing/imaging:   Additional notes:

## 2024-05-13 NOTE — PROGRESS NOTES
Clinic Care Coordination Contact  [facility]: Post-Discharge Note  SITUATION                                                      Admission:    Admission Date: 05/01/24   Reason for Admission: You were in the hospital for having black stools. We did not find a source. We think you may have an abnormal vessel that makes it easier to bleed when you are on two blood thinning medications.  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 and diverticulosis   Shortness of breath  Hypomagnesia and hypophosphatemia  Abdominal pain near incision site   Evolving postsurgical edema and/or fat necrosis     Recent admission (4/2023) for small bowel obstruction with possible ischemia  CAD s/p multiple stents (most recent 11/2023)  Sick sinus syndrom s/p dual PPM   Paroxysmal atrial fibrillation  Mild-moderate mitral regurgitation   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  Hypertension  Gout   HLD    BACKGROUND                                                      Per hospital discharge summary and inpatient provider notes:    Hospital Course  89 y/o female with medical history significant for chronic anemia, CAD with history of multiple stents (most recently 11/6/2023 LAD PCI) on Apixaban and Clopidogrel, sick sinus syndrome status-post dual-chamber PPM, paroxysmal atrial fibrillation, mild-moderate mitral regurgitation, moderate tricuspid regurgitation, 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 hypertensive nephrosclerosis, hypertension, chronic cough, history of bleeding colonic angiodysplastic lesion in the ascending colon treated with clips, colonic polyps, diverticulosis, recent admission 4/13-4/22/2024 for small bowel obstruction status-post exploratory laparotomy x3. She was admitted on 5/01 for evaluation of melana. GI was consulted. Admission Hgb  "8.1, near baseline 8-10. She received 1unit pRBC for hgb ~7.1. Normal vitals. Not tachycardic however on beta blocker pta. CT abdomen/pelvis without acute hemorrhage/fluid collection.  EGD 5/01 \"There was patchy nodular mucosa in body of stomach which had mild oozing with irritation. Unlikely to be the cause of melana. Highest on the differential is most likely oozing AVM in the setting of DOAC and antiplatelet therapy. The patient received PPI IV on admission and was transitioned to PO PPI. Resumed clopidogrel 5/03 and apixaban 05/04. No bleeding was noted after resumption. Hgb at discharge: 8.5  - continue omeprazole daily   - Avoid NSAIDS  - If there is another episode of bleeding, there should be a risk benefit discussion regarding bleeding on DOAC and antiplatelet therapy      Other concerns:   - Patient's cardiac meds were held on admission given melana. Patient had SOB on 5/06 with CXR with trace pleural effusion likely in the setting of holding cardiac meds. No pulmonary edema. Clear lungs, did not need oxygen. Her BNP was decreased from prior. This was resolved at the time of discharge. Continue lasix, amlodipine, isosorbide mononitrate, and metoprolol   - She had abdominal pain at the incision site. CT abdomen/pelvis here notable for \"Postsurgical changes of the abdomen with ventral abdominal wall incision. Immediately deep to the incision and rectus abdominis musculature, there is stranding associated with lobules of fat, likely evolving postsurgical edema and/or fat necrosis. Soft tissue thickening in the transverse mesocolon and small amount of blood products in the right central mesentery, also likely evolving surgical change\". She is not having new abdominal pain. Has previous pain that is present at the superior portion of her abdominal incision. Surgery consulted given concern for fat necrosis - nothing to do staples removed. Managed with lidocaine patch and prn tylenol  - Patient had electrolyte " abnormalities inpatient (hypomagnesia and hypophosphatemia), resolved with repletion       ASSESSMENT           Discharge Assessment  How are you doing now that you are home?: patient reports doing well, states things are going well, but is concerned that as of today, she might have another concern for darker stools. No other signs of bleeding, no other concerns or symptoms. patient has home care help that comes 2x per week which patient feels is sufficient for monitoring concerns. RN informed patient that should see Cardiology as recommended, and offered appt sooner with PCP which patient accepted. Patient plans to watch her stools today to see if they remain dark, or if they lighten. RN reviewed patient's discharge Hgb with her as well.  How are your symptoms? (Red Flag symptoms escalate to triage hotline per guidelines): Improved  Do you feel your condition is stable enough to be safe at home until your provider visit?: Yes  Does the patient have their discharge instructions? : Yes  Does the patient have questions regarding their discharge instructions? : No  Were you started on any new medications or were there changes to any of your previous medications? : Yes  Does the patient have all of their medications?: Yes  Do you have questions regarding any of your medications? : No  Do you have all of your needed medical supplies or equipment (DME)?  (i.e. oxygen tank, CPAP, cane, etc.): Yes  Discharge follow-up appointment scheduled within 14 calendar days? : No  Discharge Follow Up Appointment Date: 05/14/24  Discharge Follow Up Appointment Scheduled with?: Primary Care Provider  Is patient agreeable to assistance with scheduling? : Yes                PLAN                                                      Outpatient Plan:  RN offered CC to patient as well, which patient declined, feels that she has sufficient support with home care support. RN did schedule patient for follow up with Dr. Gomez. Pt to see  Cardiology for follow up of blood thinners as well, no cardiology appts scheduled for follow up, message sent to cardiology team (last saw Dr. Santoyo in January) for help with scheduling follow up with patient.     Future Appointments   Date Time Provider Department Center   5/14/2024  9:00 AM Pedro Gomez MD [facility]   6/17/2024  1:20 PM [facility]   7/12/2024 12:00 AM  ICD REMOTE [facility]   8/14/2024  1:00 PM Pedro Gomez MD [facility]   8/16/2024 10:30 AM [facility]   8/19/2024  1:45 PM Marzen[facility] Martin [facility]   10/15/2024 12:00 AM  [facility]         For any urgent concerns, please contact our 24 hour nurse triage line: [phone]       LUPE CALLE RN

## 2024-05-14 ENCOUNTER — OFFICE VISIT (OUTPATIENT)
Dept: FAMILY MEDICINE | Facility: CLINIC | Age: 89
End: 2024-05-14
Payer: COMMERCIAL

## 2024-05-14 ENCOUNTER — LAB (OUTPATIENT)
Dept: LAB | Facility: CLINIC | Age: 89
End: 2024-05-14
Payer: COMMERCIAL

## 2024-05-14 VITALS
HEART RATE: 82 BPM | WEIGHT: 142.1 LBS | SYSTOLIC BLOOD PRESSURE: 126 MMHG | BODY MASS INDEX: 22.84 KG/M2 | HEIGHT: 66 IN | OXYGEN SATURATION: 99 % | DIASTOLIC BLOOD PRESSURE: 62 MMHG

## 2024-05-14 DIAGNOSIS — K92.2 GASTROINTESTINAL HEMORRHAGE, UNSPECIFIED GASTROINTESTINAL HEMORRHAGE TYPE: ICD-10-CM

## 2024-05-14 DIAGNOSIS — N18.30 STAGE 3 CHRONIC KIDNEY DISEASE, UNSPECIFIED WHETHER STAGE 3A OR 3B CKD (H): ICD-10-CM

## 2024-05-14 DIAGNOSIS — N39.0 RECURRENT UTI: ICD-10-CM

## 2024-05-14 DIAGNOSIS — Z09 HOSPITAL DISCHARGE FOLLOW-UP: Primary | ICD-10-CM

## 2024-05-14 LAB
ANION GAP SERPL CALCULATED.3IONS-SCNC: 15 MMOL/L (ref 7–15)
BASOPHILS # BLD AUTO: ABNORMAL 10*3/UL
BASOPHILS # BLD MANUAL: 0.2 10E3/UL (ref 0–0.2)
BASOPHILS NFR BLD AUTO: ABNORMAL %
BASOPHILS NFR BLD MANUAL: 3 %
BUN SERPL-MCNC: 28.2 MG/DL (ref 8–23)
CALCIUM SERPL-MCNC: 8.9 MG/DL (ref 8.8–10.2)
CHLORIDE SERPL-SCNC: 108 MMOL/L (ref 98–107)
CREAT SERPL-MCNC: 1.93 MG/DL (ref 0.51–0.95)
DEPRECATED HCO3 PLAS-SCNC: 21 MMOL/L (ref 22–29)
EGFRCR SERPLBLD CKD-EPI 2021: 25 ML/MIN/1.73M2
EOSINOPHIL # BLD AUTO: ABNORMAL 10*3/UL
EOSINOPHIL # BLD MANUAL: 0.1 10E3/UL (ref 0–0.7)
EOSINOPHIL NFR BLD AUTO: ABNORMAL %
EOSINOPHIL NFR BLD MANUAL: 2 %
ERYTHROCYTE [DISTWIDTH] IN BLOOD BY AUTOMATED COUNT: 20.2 % (ref 10–15)
GLUCOSE SERPL-MCNC: 139 MG/DL (ref 70–99)
HCT VFR BLD AUTO: 31.6 % (ref 35–47)
HGB BLD-MCNC: 9.7 G/DL (ref 11.7–15.7)
IMM GRANULOCYTES # BLD: ABNORMAL 10*3/UL
IMM GRANULOCYTES NFR BLD: ABNORMAL %
LYMPHOCYTES # BLD AUTO: ABNORMAL 10*3/UL
LYMPHOCYTES # BLD MANUAL: 1.5 10E3/UL (ref 0.8–5.3)
LYMPHOCYTES NFR BLD AUTO: ABNORMAL %
LYMPHOCYTES NFR BLD MANUAL: 28 %
MCH RBC QN AUTO: 28 PG (ref 26.5–33)
MCHC RBC AUTO-ENTMCNC: 30.7 G/DL (ref 31.5–36.5)
MCV RBC AUTO: 91 FL (ref 78–100)
MONOCYTES # BLD AUTO: ABNORMAL 10*3/UL
MONOCYTES # BLD MANUAL: 0.8 10E3/UL (ref 0–1.3)
MONOCYTES NFR BLD AUTO: ABNORMAL %
MONOCYTES NFR BLD MANUAL: 15 %
NEUTROPHILS # BLD AUTO: ABNORMAL 10*3/UL
NEUTROPHILS # BLD MANUAL: 2.7 10E3/UL (ref 1.6–8.3)
NEUTROPHILS NFR BLD AUTO: ABNORMAL %
NEUTROPHILS NFR BLD MANUAL: 52 %
NRBC # BLD AUTO: 0 10E3/UL
NRBC BLD AUTO-RTO: 0 /100
PLAT MORPH BLD: NORMAL
PLATELET # BLD AUTO: 105 10E3/UL (ref 150–450)
POTASSIUM SERPL-SCNC: 3.9 MMOL/L (ref 3.4–5.3)
RBC # BLD AUTO: 3.47 10E6/UL (ref 3.8–5.2)
RBC MORPH BLD: NORMAL
SODIUM SERPL-SCNC: 144 MMOL/L (ref 135–145)
WBC # BLD AUTO: 5.2 10E3/UL (ref 4–11)

## 2024-05-14 PROCEDURE — 85027 COMPLETE CBC AUTOMATED: CPT | Performed by: PATHOLOGY

## 2024-05-14 PROCEDURE — 80048 BASIC METABOLIC PNL TOTAL CA: CPT | Performed by: PATHOLOGY

## 2024-05-14 PROCEDURE — 99496 TRANSJ CARE MGMT HIGH F2F 7D: CPT | Performed by: FAMILY MEDICINE

## 2024-05-14 PROCEDURE — 85007 BL SMEAR W/DIFF WBC COUNT: CPT | Performed by: PATHOLOGY

## 2024-05-14 PROCEDURE — 36415 COLL VENOUS BLD VENIPUNCTURE: CPT | Performed by: PATHOLOGY

## 2024-05-14 NOTE — TELEPHONE ENCOUNTER
Left detailed VM on secure voicemail box for Yen MEDINA Case Manager with [facility] with the following verbal order(s): Home Care Orders: Other: P/T and O/T to [facility] and then Skilled Nursing 1x a week for 2 weeks followed by 1x a week every other week for 4 weeks with 3 PRN's education, and disease management, monitoring her incision and steri strips for 30 days. Start of Care was Friday May 10th.  Jordyn DUQUE LPN  [facility]

## 2024-05-14 NOTE — PROGRESS NOTES
Not all questionnaires were able to be completed at this visit. Pt declined.    Assessment & Plan     Hospital discharge follow-up  Stable to better    Recurrent UTI  Recheck; no sx now  - UA Macroscopic with reflex to Microscopic and Culture - Lab Collect; Future    Gastrointestinal hemorrhage, unspecified gastrointestinal hemorrhage type  Hgb better  - CBC with platelets and differential; Future    Stage 3 chronic kidney disease, unspecified whether stage 3a or 3b CKD (H)  Encourage hydration  - Basic metabolic panel  (Ca, Cl, CO2, Creat, Gluc, K, Na, BUN); Future      MED REC REQUIRED{  Post Medication Reconciliation Status:         No follow-ups on file.    Lashell Zarate is a 89 year old, presenting for the following health issues:  Hospital F/U (Pt here to follow up on [facility] visit from 5/1/2024 - 5/7/2024; pt reports fatigue)      5/14/2024     8:48 AM   Additional Questions   Roomed by Yaa MCGUIRE     HPI   Here w/   Since home a bit better, not worse  Has HHN   helps  Appetite not great but takes po; discussed ways to get more nutrition eg nutritious beverage instead of plain water for now  No GI bleed symptoms since home  No n/v  No GI pain  Reviewed inpt Urosepsis, then SBO/surgery, then GI bleed all this spring  No urosepsis sx today  Sees heart MD soon will discuss w/ them if can hold blood thinners a few days for dental work  Defers covid rsv dtap all discussed she knows can do at drugstore        5/13/2024     9:08 AM   Post Discharge Outreach   Admission Date 5/1/2024   Reason for Admission You were in the hospital for having black stools. We did not find a source. We think you may have an abnormal vessel that makes it easier to bleed when you are on two blood thinning medications.   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 patient reports doing well, states things are going well, but is concerned that as of today, she might have another concern for darker stools. No other signs of bleeding, no other concerns or symptoms. patient has home care help that comes 2x per week which patient feels is sufficient for monitoring concerns. RN informed patient that should see Cardiology as recommended, and offered appt sooner with PCP which patient accepted. Patient plans to watch her stools today to see if they remain dark, or if they lighten. RN reviewed patient's discharge Hgb with her as well.   How are your symptoms? (Red Flag symptoms escalate to triage hotline per guidelines) Improved   Do you feel your condition is stable enough to be safe at home until your provider visit? Yes   Does the patient have their discharge instructions?  Yes   Does the patient have questions regarding their discharge instructions?  No   Were you started on any new medications or were there changes to any of your previous medications?  Yes   Does the patient have all of their medications? Yes   Do you have questions regarding any of your medications?  No   Do you have all of your needed medical supplies or equipment (DME)?  (i.e. oxygen tank, CPAP, cane, etc.) Yes   Discharge follow-up appointment scheduled within 14 calendar days?  No   Discharge Follow Up Appointment Date 5/14/2024   Discharge Follow Up Appointment Scheduled with? Primary Care Provider     [facility]  "Follow-up Visit:  May 1-7 2024 [facility] re: GI bleed  Was the patient in the ICU or did the patient experience delirium during hospitalization?  No  Do you have any other stressors you would like to discuss with your provider? No    Problems taking medications regularly:  None  Medication changes since discharge: None  Problems adhering to non-medication therapy:  None    Summary of hospitalization:  [facility] discharge summary reviewed  Diagnostic Tests/Treatments reviewed.  Follow up needed: none  Other Healthcare Providers Involved in Patient s Care:         Homecare  Update since discharge: improved.     Plan of care communicated with patient           Objective    /62 (BP Location: Left arm, Patient Position: Sitting, Cuff Size: Adult Regular)   Pulse 82   Ht 1.676 m (5' 6\")   Wt 64.5 kg (142 lb 1.6 oz)   SpO2 99%   BMI 22.94 kg/m    Body mass index is 22.94 kg/m .  Physical Exam   GENERAL: alert and no distress  NECK: no adenopathy, no asymmetry, masses, or scars  RESP: lungs clear to auscultation - no rales, rhonchi or wheezes  CV: regular rate and rhythm, normal S1 S2, no S3 or S4, no murmur, click or rub, no peripheral edema  ABDOMEN: soft, nontender, no hepatosplenomegaly, no masses and bowel sounds normal  MS: no gross musculoskeletal defects noted, no edema            Signed Electronically by: Pedro Gomez MD    "

## 2024-05-16 ENCOUNTER — MEDICAL CORRESPONDENCE (OUTPATIENT)
Dept: HEALTH INFORMATION MANAGEMENT | Facility: CLINIC | Age: 89
End: 2024-05-16
Payer: COMMERCIAL

## 2024-05-16 ENCOUNTER — TELEPHONE (OUTPATIENT)
Dept: CARDIOLOGY | Facility: CLINIC | Age: 89
End: 2024-05-16
Payer: COMMERCIAL

## 2024-05-16 NOTE — CONFIDENTIAL NOTE
"Returned call to [facility] and let them know that as long as patient is not symptomatic with \"extra beat\" this is expected as we know Tessa has documented PACs and PVCs per EKG.  "

## 2024-05-16 NOTE — TELEPHONE ENCOUNTER
[facility] Call Center    Phone Message    May a detailed message be left on voicemail: yes     Reason for Call: Other: Saurabh called, upon exam today she noticed an extra beat. She would like to speak to a nurse about this. Patient states she has more fatigue. Please call Marni to discuss.She is aware the patient has an appt today.       Action Taken: Other: cardiology    Travel Screening: Not Applicable  Thank you!  [facility]

## 2024-05-19 NOTE — PROGRESS NOTES
"[facility]    HPI:   Ms. Tessa Mansfield is a 89 year old female with a history including CAD with multiple PCIs (see history below), sick sinus syndrome s/p dual-chamber PPM, paroxysmal atrial fibrillation, SBO s/p ex-lap, acute on chronic blood loss anemia, CKD IV, and renal hypertension. She presents to clinic for routine follow up    She was seen in cardiology clinic by Dr. Santoyo in January 2024. She noted significant improvement in exertional dyspnea symptoms after PCI in November 2023. No medication changes were made.    Clarice has had several hospitalizations in the past several months. She was hospitalized 4/13-22 for a small bowel obstruction and acute anemia with Hgb 5, underwent emergent ex-lap, hematoma found along colon. She was re-admitted 5/1-7 for melena, CT abdomen/pelvis without acute hemorrhage or fluid collection. EGD 5/01 \"There was patchy nodular mucosa in body of stomach which had mild oozing with irritation. Unlikely to be the cause of melana. Highest on the differential is most likely oozing AVM in the setting of DOAC and antiplatelet therapy. The patient received PPI IV on admission and was transitioned to PO PPI. Resumed clopidogrel 5/03 and apixaban 05/04. No bleeding was noted after resumption. Hgb at discharge: 8.5     Following her recent hospitalization, she spent 1 week in TCU and was discharged home. She has home health coming in 2 days a week and PT 2 days a week. She lives in a 4 level home - 7 steps to go up each floor. She is able to ambulate around the house with her walker, climbs the stairs using hand rails on both sides. She is slowly improving but still feels weak, fatigued and decreased stamina. Also endorses shortness of breath, hard to determine if this is cardiac related or deconditioning. No orthopnea, PND, leg swelling, weight gain. No lightheadedness, palpitations or syncope. Per patient her BP is checked by home health and is pretty good. She is maintained on " Plavix and Eliquis. No further tarry or bloody stools since 5/1/24.     Summary of CAD history:  10/27/2002: AMI s/p PCI+BMS (3.5x18mm Bx velocity) to mRCA  2/5/2003: Back pain; PCI+stent to mLCX c/b distal embolization/slow flow  4/11/2003: Unstable angina; PCI+stent (Hepacoat velocity) to mLAD  11/27/2007: PCI+DESx2 to pLAD (IVUS w/ calcification of LMCA and ulcerated plaque pLAD, 80% dRCA; also had 80% dLCX, too small to stent)  12/11/2007: Staged PCI. PCI+DESx1 (3.5x13mm Cypher) to dRCA (indefinite DAPT recommended at this time)  6/27/2008: Angina. mLCX stent 70% ISR. LAD and RCA stents patent. Myocardium at risk from LCX felt to be small, medical management preferred to PCI.  11/10/2009: Angina. Findings unchanged from 6/27/2008, FFR LAD 0.90. Medical management recommended.  7/23/2013: Unstable angina; PCI+ALVAREZ to mPDA. Diagnostic findings: LMCA 40% distal. LAD: pLAD stents patent mLAD 30% ISR dLAD 50% diffuse, D2 diffuse disease. LCX 80% mid ISR. RCA diffuse <30% mid, RPLAs diffuse disease, RPDA 100% occlusion.   5/27/2022: 90% dRCA in-stent -> PCI+DESx1 to dRCA.  50-60% pRCA (return for hemodynamic assessment if symptoms persist/recur) 50-60% small LCx (medical management recommended)  7/18/23: 80% ISR of dRCA --> PCI w/ALVAREZ x2, RPDA-2 lesion is 50% stenosed, RPDA-1 lesion is 65% stenosed, Ost LAD to Prox LAD lesion is 50% stenosed, Prox LAD to Mid LAD lesion is 40% stenosed, mLAD 70% stenosed, prox Cx to Mid Cx lesion is 55% stenosed small vessel.  11/6/23: LAD PCI with improvement in symptoms      PAST MEDICAL HISTORY:  Past Medical History:   Diagnosis Date    CAD (coronary artery disease)     CAD s/p PCI+BMS to MRCA 10/2002, PCI to mLCX 2/2003, PCI+DESx2 to pLAD 11/2007, PCI+DESx1 to dRCA 12/2007, PCI+ALVAREZ to RPDA 7/2013; on indefinite DAPT  10/27/2002    10/27/2002: AMI s/p PCI+BMS (3.5x18mm Bx velocity) to mRCA 2/5/2003: Back pain; PCI+stent to mLCX c/b distal embolization/slow flow 4/11/2003: Unstable  angina; PCI+stent (Hepacoat velocity) to mLAD 11/27/2007: PCI+DESx2 to pLAD (IVUS w/ calcification of LMCA and ulcerated plaque pLAD, 80% dRCA; also had 80% dLCX, too small to stent) 12/11/2007: Staged PCI. PCI+DESx1 (3.5x13mm Cypher) to dRCA (indefinite DAPT recommended at this time) 6/27/2008: Angina. mLCX stent 70% ISR. LAD and RCA stents patent. Myocardium at risk from LCX felt to be small, medical management preferred to PCI. 11/10/2009: Angina. Findings unchanged from 6/27/2008, FFR LAD 0.90. Medical management recommended. 7/23/2013: Unstable angina; PCI+ALVAREZ to mPDA. Diagnostic findings: LMCA 40% distal. LAD: pLAD stents patent mLAD 30% ISR dLAD 50% diffuse, D2 diffuse disease. LCX 80% mid ISR. RCA diffuse <30% mid, RPLAs diffuse disease, RPDA 100% occlusion.      Cardiac Pacemaker- Medtronic, dual chamber- NOT dependant 11/28/2007    CKD (chronic kidney disease), stage IV (H)     Hyperlipidemia LDL goal <70     Hypertension     Stented coronary artery 10-    RCA    Stented coronary artery 2-5-2003    LCx    Stented coronary artery 4-    LAD    Stented coronary artery 11-    LAD    Stented coronary artery 12-    RCA    Transient complete heart block (H) 11/28/2007       FAMILY HISTORY:  Family History   Problem Relation Age of Onset    Cancer Sister 82        bladder cancer       SOCIAL HISTORY:  Social History     Socioeconomic History    Marital status:     Number of children: 4   Occupational History     Employer: [employer]   Tobacco Use    Smoking status: Former     Current packs/day: 0.30     Average packs/day: 0.3 packs/day for 15.0 years (4.5 ttl pk-yrs)     Types: Cigarettes    Smokeless tobacco: Never    Tobacco comments:     Quit 35+ years ago   Substance and Sexual Activity    Drug use: No    Sexual activity: Not Currently     Partners: Male     Birth control/protection: Post-menopausal   Other Topics Concern    Blood Transfusions Yes    Exercise No      Social Determinants of Health     Financial Resource Strain: Low Risk  (11/15/2023)    Financial Resource Strain     Within the past 12 months, have you or your family members you live with been unable to get utilities (heat, electricity) when it was really needed?: No   Food Insecurity: Low Risk  (11/15/2023)    Food Insecurity     Within the past 12 months, did you worry that your food would run out before you got money to buy more?: No     Within the past 12 months, did the food you bought just not last and you didn t have money to get more?: No   Transportation Needs: Low Risk  (11/15/2023)    Transportation Needs     Within the past 12 months, has lack of transportation kept you from medical appointments, getting your medicines, non-medical meetings or appointments, work, or from getting things that you need?: No   Interpersonal Safety: Low Risk  (11/15/2023)    Interpersonal Safety     Do you feel physically and emotionally safe where you currently live?: Yes     Within the past 12 months, have you been hit, slapped, kicked or otherwise physically hurt by someone?: No     Within the past 12 months, have you been humiliated or emotionally abused in other ways by your partner or ex-partner?: No   Housing Stability: Low Risk  (11/15/2023)    Housing Stability     Do you have housing? : Yes     Are you worried about losing your housing?: No       CURRENT MEDICATIONS:  Current Outpatient Medications   Medication Sig Dispense Refill    acetaminophen (TYLENOL) 325 MG tablet Take 975 mg by mouth nightly as needed for pain      allopurinol (ZYLOPRIM) 100 MG tablet Take 1 tablet (100 mg) by mouth daily 90 tablet 0    amLODIPine (NORVASC) 10 MG tablet Take 1 tablet (10 mg) by mouth daily 90 tablet 3    apixaban ANTICOAGULANT (ELIQUIS) 2.5 MG tablet Take 1 tablet (2.5 mg) by mouth 2 times daily 180 tablet 3    budesonide (PULMICORT) 0.5 MG/2ML neb solution Take 2 mLs (0.5 mg) by nebulization 2 times daily 2 mL 3     clopidogrel (PLAVIX) 75 MG tablet Take 1 tablet (75 mg) by mouth daily 90 tablet 1    ferrous sulfate (FEROSUL) 325 (65 Fe) MG tablet Take 1 tablet by mouth 4 times weekly on Sunday, Tuesday, Thursday, and Saturday.      fluticasone-salmeterol (ADVAIR) 250-50 MCG/ACT inhaler INHALE 1 DOSE BY MOUTH TWICE DAILY 60 each 8    furosemide (LASIX) 40 MG tablet Take 40 mg by mouth daily May take an additional 40 mg at noon as needed for swelling.      isosorbide mononitrate (ISMO/MONOKET) 20 MG tablet Take 60 mg by mouth 2 times daily      methocarbamol (ROBAXIN) 500 MG tablet Take 1 tablet (500 mg) by mouth every 6 hours as needed for muscle spasms 30 tablet 0    metoprolol tartrate (LOPRESSOR) 25 MG tablet Take 1 tablet (25 mg) by mouth 2 times daily for 30 days 60 tablet 0    Multiple Vitamins-Minerals (PRESERVISION AREDS 2+MULTI VIT PO) Take 1 capsule by mouth 2 times daily      omeprazole (PRILOSEC) 40 MG DR capsule Take 1 capsule (40 mg) by mouth daily 90 capsule 3    potassium chloride ER (K-TAB/KLOR-CON) 10 MEQ CR tablet Take 1 tablet (10 mEq) by mouth daily 90 tablet 3    psyllium (METAMUCIL) 28.3 % packet Take 1 packet by mouth daily as needed for constipation      rosuvastatin (CRESTOR) 20 MG tablet Take 1 tablet (20 mg) by mouth daily for 360 days 90 tablet 3    timolol maleate (TIMOPTIC) 0.5 % ophthalmic solution INSTILL 1 DROP INTO EACH EYE ONCE DAILY IN THE MORNING      vitamin B-12 (CYANOCOBALAMIN) 500 MCG tablet Take 1 tablet by mouth daily      vitamin D3 25 mcg (1000 units) tablet Take 1 tablet (25 mcg) by mouth every other day 90 tablet 3     No current facility-administered medications for this visit.       ROS:   Refer to HPI    EXAM:  /74 (BP Location: Left arm, Patient Position: Chair, Cuff Size: Adult Regular)   Pulse 89   Wt 65.9 kg (145 lb 3.2 oz)   SpO2 98%   BMI 23.44 kg/m    GENERAL: Appears comfortable, in no acute distress but does appear deconditioned  HEENT: Eye symmetrical, no  discharge or icterus bilaterally. Mucous membranes moist and without lesions.  CV: RRR, no murmur, rub, or gallop. JVD normal   RESPIRATORY: Respirations regular, even, and unlabored. Lungs CTA throughout.   GI: Soft and non distended with normoactive bowel sounds present in all quadrants. No tenderness, rebound, guarding. No hepatomegaly.   EXTREMITIES: minimal peripheral edema. 2+ bilateral pedal pulses.   NEUROLOGIC: Alert and oriented x 3. No focal deficits.   MUSCULOSKELETAL: No joint swelling or tenderness.   SKIN: No jaundice. No rashes or lesions.     Labs, reviewed with patient in clinic today:  CBC RESULTS:  Lab Results   Component Value Date    WBC 5.2 05/14/2024    WBC 5.4 08/26/2020    RBC 3.47 (L) 05/14/2024    RBC 3.58 (L) 08/26/2020    HGB 9.7 (L) 05/14/2024    HGB 11.2 (L) 08/26/2020    HCT 31.6 (L) 05/14/2024    HCT 36.0 08/26/2020    MCV 91 05/14/2024     (H) 08/26/2020    MCH 28.0 05/14/2024    MCH 31.3 08/26/2020    MCHC 30.7 (L) 05/14/2024    MCHC 31.1 (L) 08/26/2020    RDW 20.2 (H) 05/14/2024    RDW 14.9 08/26/2020     (L) 05/14/2024     (L) 08/26/2020       CMP RESULTS:  Lab Results   Component Value Date     05/14/2024     08/26/2020    POTASSIUM 3.9 05/14/2024    POTASSIUM 4.0 04/13/2024    POTASSIUM 4.8 11/21/2022    POTASSIUM 4.4 08/26/2020    CHLORIDE 108 (H) 05/14/2024    CHLORIDE 110 (H) 11/21/2022    CHLORIDE 111 (H) 08/26/2020    CO2 21 (L) 05/14/2024    CO2 24 11/21/2022    CO2 24 08/26/2020    ANIONGAP 15 05/14/2024    ANIONGAP 6 11/21/2022    ANIONGAP 8 08/26/2020     (H) 05/14/2024     (H) 04/21/2024    GLC 79 11/21/2022    GLC 84 08/26/2020    BUN 28.2 (H) 05/14/2024    BUN 35 (H) 11/21/2022    BUN 41 (H) 08/26/2020    CR 1.93 (H) 05/14/2024    CR 1.70 (H) 08/26/2020    GFRESTIMATED 25 (L) 05/14/2024    GFRESTIMATED 27 (L) 08/26/2020    GFRESTBLACK 32 (L) 08/26/2020    ALEXANDRO 8.9 05/14/2024    ALEXANDRO 9.0 08/26/2020    BILITOTAL 0.3  05/05/2024    BILITOTAL 0.4 06/25/2018    ALBUMIN 3.4 (L) 05/05/2024    ALBUMIN 3.8 11/21/2022    ALBUMIN 3.6 08/26/2020    ALKPHOS 70 05/05/2024    ALKPHOS 104 06/25/2018    ALT 17 05/05/2024    ALT 14 06/25/2018    AST 30 05/05/2024    AST 15 06/25/2018        INR RESULTS:  Lab Results   Component Value Date    INR 1.30 (H) 05/02/2024    INR 1.08 12/27/2013       Lab Results   Component Value Date    MAG 1.7 05/07/2024    MAG 2.4 (H) 01/06/2020     Lab Results   Component Value Date    NTBNPI 2,161 (H) 05/06/2024     Lab Results   Component Value Date    NTBNP 1,423 06/28/2023       Diagnostics:    Cath 11/2023:  88F with severe CAD s/p multiple PCIs and known mid LAD stenosis who presents for likely percutaneous coronary intervention.     Pre Procedure Diagnosis    worsening angina       Conclusion         Prox LAD to Mid LAD lesion is 40% stenosed.    Ost LAD to Prox LAD lesion is 50% stenosed.    Prox Cx to Mid Cx lesion is 55% stenosed.    1st Diag lesion is 85% stenosed.    2nd Diag lesion is 70% stenosed.    RPDA-2 lesion is 50% stenosed.    RPDA-1 lesion is 65% stenosed.    Mid LAD lesion is 70% stenosed.     S/p ALVAREZ to mid LAD         Coronary Findings    Diagnostic  Dominance: Right  Left Anterior Descending   The vessel is small.   Ost LAD to Prox LAD lesion is 50% stenosed. The lesion was previously treated using a stent of unknown type.   Prox LAD to Mid LAD lesion is 40% stenosed. The lesion was previously treated using a stent of unknown type.   Mid LAD lesion is 70% stenosed.      First Diagonal Branch   The vessel is small. There is moderate diffuse disease throughout the vessel.   1st Diag lesion is 85% stenosed.      Second Diagonal Branch   2nd Diag lesion is 70% stenosed.      Left Circumflex   The vessel is small.   Prox Cx to Mid Cx lesion is 55% stenosed. The lesion was previously treated using a stent of unknown type.      First Obtuse Marginal Branch   The vessel is large.      Second  Obtuse Marginal Branch   The vessel is small.      Right Coronary Artery   The vessel is large.   Previously placed Mid RCA to Dist RCA drug-eluting and unknown type of stents are widely patent. The lesion was previously treated using angioplasty.   Previously placed Dist RCA drug-eluting and unknown type of stents are widely patent. The lesion was previously treated using angioplasty.      Right Posterior Descending Artery   There is mild diffuse disease throughout the vessel.   RPDA-1 lesion is 65% stenosed.   RPDA-2 lesion is 50% stenosed. The lesion was previously treated using a stent of unknown type.      Right Posterior Atrioventricular Artery   There is mild diffuse disease throughout the vessel.         Intervention     Mid LAD lesion   Stent   The pre-interventional distal flow is normal (BREANNA 3). A STENT CORONARY ALVAREZ SYNERGY XD MR US [id number] drug eluting stent was successfully placed. Post-stent angioplasty was performed. The post-interventional distal flow is normal (BREANNA 3).   There is a 0% residual stenosis post intervention.             Echocardiogram 10/2023:  Interpretation Summary  Global and regional left ventricular function is normal with an EF of 55-60%.  Right ventricular function, chamber size, wall motion, and thickness are  normal.  Mild to moderate mitral insufficiency is present.  Mild tricuspid insufficiency is present.  The right ventricular systolic pressure is 35mmHg above the right atrial  pressure.  The inferior vena cava is normal.  No pericardial effusion is present.    Device Interrogation 4/11/24:  Device: Medtronic [id number] [place] XT DR MRI  Normal Device Function  Mode: AAIR<=>DDDR  bpm  AP: 68.0%  : <0.1%  Presenting EGM: AP-VS @ 60 bpm  Short V-V intervals: 0  Lead Trends Appear Stable.  Estimated battery longevity to RRT = 9.1 years. Battery voltage = 3.00V.   No Atrial/Ventricular Arrhythmias recorded.  Anticoagulant: apixaban  Pt Notified of  Transmission Results: EPIC results letter mailed.  Plan: Automatic remote transmission as scheduled on 7/12/2024.  MALICK Kumar, Device Technician/ MAHESH Martinez RN    Assessment and Plan:   Ms. Mansfield is a 89 year old female with a PMH of CAD with multiple PCIs, sick sinus syndrome s/p dual-chamber PPM, paroxysmal atrial fibrillation, SBO s/p ex-lap, acute on chronic blood loss anemia, CKD IV, and renal hypertension.    # Coronary artery disease s/p multiple PCIs (most recent mLAD 11/2023)  # Chronic dyspnea  Dyspnea is patient anginal equivalent and had improved post PCI. Now reporting dyspnea but is very deconditioned following recent hospitalization, also anemic. Recommended she continue to rehab and let us know if dyspnea becomes worse at which point we would want to see her back.   - continue plavix 81 mg daily  - continue statin  - continue to monitor HIGGINS, contact us if it worsens     # Sick sinus syndrome s/p dual chamber PPM  Most recent device check 4/2024 shows a-paced 68% with no arrhythmias   - device clinic follow-up     # Moderate mitral regurgitation  # Mild tricuspid regurgitation  - Lasix 40mg daily  - Imdur daily + hydral 30mg TID for afterload reduction    # Paroxysmal atrial fibrillation   [id number] = 5  BB: Lopressor 25mg BID  AC: currently on DOAC     # Renal HTN  # CKD stage IV  Baseline creatinine 1.8-2.2, creat1.93 on Tuesday.   amlodipine 10 mg, Imdur 120 mg daily, hydralazine 30 mg TID   - continue lasix 40 mg daily, repeat BMP June 12th  -followed by Odilia Martin and [facility]     # Acute on chronic anemia  Hospitalization in April 2024 for emergent SBO and acute anemia with Hgb 5, ex-lap found mesenteric hematoma. Repeat admission several weeks later with melena, CT a/p showed no acute bleeding, thought likely oozing. Hgb stable at 8.   - Patient is at risk for recurrent GI bleeding on Plavix for CAD and DOAC for AF - HASBLED = 5   - recommend consideration of watchman once she fully  recovers  - defer to primary cardiologist Dr. Santoyo  - Continue PPI therapy     Follow up: Cardiology in 6 months   Chart review time: 15 min  Patient visit time: 15 min  Total time: 30 min    JI Loyola, CNP  Structural Heart Nurse Practitioner  [facility]  Clinic: [phone]  Pager: [phone]

## 2024-05-20 ENCOUNTER — LAB (OUTPATIENT)
Dept: LAB | Facility: CLINIC | Age: 89
End: 2024-05-20
Payer: COMMERCIAL

## 2024-05-20 ENCOUNTER — OFFICE VISIT (OUTPATIENT)
Dept: CARDIOLOGY | Facility: CLINIC | Age: 89
End: 2024-05-20
Attending: NURSE PRACTITIONER
Payer: COMMERCIAL

## 2024-05-20 VITALS
HEART RATE: 89 BPM | SYSTOLIC BLOOD PRESSURE: 129 MMHG | DIASTOLIC BLOOD PRESSURE: 74 MMHG | BODY MASS INDEX: 23.44 KG/M2 | OXYGEN SATURATION: 98 % | WEIGHT: 145.2 LBS

## 2024-05-20 DIAGNOSIS — Z79.01 LONG TERM (CURRENT) USE OF ANTICOAGULANTS: ICD-10-CM

## 2024-05-20 DIAGNOSIS — I48.91 ATRIAL FIBRILLATION, UNSPECIFIED TYPE (H): ICD-10-CM

## 2024-05-20 DIAGNOSIS — I10 ESSENTIAL HYPERTENSION: ICD-10-CM

## 2024-05-20 DIAGNOSIS — Z98.62 S/P ANGIOPLASTY: ICD-10-CM

## 2024-05-20 DIAGNOSIS — N39.0 RECURRENT UTI: ICD-10-CM

## 2024-05-20 DIAGNOSIS — N18.4 CHRONIC KIDNEY DISEASE, STAGE IV (SEVERE) (H): ICD-10-CM

## 2024-05-20 DIAGNOSIS — I25.10 CORONARY ARTERY DISEASE INVOLVING NATIVE CORONARY ARTERY OF NATIVE HEART WITHOUT ANGINA PECTORIS: Primary | ICD-10-CM

## 2024-05-20 LAB
ALBUMIN UR-MCNC: 20 MG/DL
APPEARANCE UR: CLEAR
BILIRUB UR QL STRIP: NEGATIVE
COLOR UR AUTO: ABNORMAL
CREAT UR-MCNC: 61.2 MG/DL
GLUCOSE UR STRIP-MCNC: NEGATIVE MG/DL
HGB UR QL STRIP: NEGATIVE
KETONES UR STRIP-MCNC: NEGATIVE MG/DL
LEUKOCYTE ESTERASE UR QL STRIP: NEGATIVE
MICROALBUMIN UR-MCNC: 149 MG/L
MICROALBUMIN/CREAT UR: 243.46 MG/G CR (ref 0–25)
MUCOUS THREADS #/AREA URNS LPF: PRESENT /LPF
NITRATE UR QL: NEGATIVE
PH UR STRIP: 5 [PH] (ref 5–7)
RBC URINE: 0 /HPF
SP GR UR STRIP: 1.01 (ref 1–1.03)
SQUAMOUS EPITHELIAL: 2 /HPF
UROBILINOGEN UR STRIP-MCNC: NORMAL MG/DL
WBC URINE: 0 /HPF

## 2024-05-20 PROCEDURE — G0463 HOSPITAL OUTPT CLINIC VISIT: HCPCS | Performed by: NURSE PRACTITIONER

## 2024-05-20 PROCEDURE — 99000 SPECIMEN HANDLING OFFICE-LAB: CPT | Performed by: PATHOLOGY

## 2024-05-20 PROCEDURE — 81001 URINALYSIS AUTO W/SCOPE: CPT | Performed by: PATHOLOGY

## 2024-05-20 PROCEDURE — 82043 UR ALBUMIN QUANTITATIVE: CPT | Performed by: INTERNAL MEDICINE

## 2024-05-20 PROCEDURE — 99214 OFFICE O/P EST MOD 30 MIN: CPT | Mod: 24 | Performed by: NURSE PRACTITIONER

## 2024-05-20 ASSESSMENT — PAIN SCALES - GENERAL: PAINLEVEL: NO PAIN (0)

## 2024-05-20 NOTE — NURSING NOTE
Chief Complaint   Patient presents with    Follow Up     General Cardiac clinic for NSTEMI presents concerns for bleeding with DOAC       Vitals were taken, medications reconciled.    Paige Thurner, Facilitator   12:14 PM

## 2024-05-20 NOTE — LETTER
"5/20/2024      RE: Tessa Mansfield  [address]  [facility] [address]       Dear Colleague,    Thank you for the opportunity to participate in the care of your patient, Tessa Mansfield, at the [facility] [place] [facility]. Please see a copy of my visit note below.    [facility]    HPI:   Ms. Tessa Mansfield is a 89 year old female with a history including CAD with multiple PCIs (see history below), sick sinus syndrome s/p dual-chamber PPM, paroxysmal atrial fibrillation, SBO s/p ex-lap, acute on chronic blood loss anemia, CKD IV, and renal hypertension. She presents to clinic for routine follow up    She was seen in cardiology clinic by Dr. Santoyo in January 2024. She noted significant improvement in exertional dyspnea symptoms after PCI in November 2023. No medication changes were made.    Clarice has had several hospitalizations in the past several months. She was hospitalized 4/13-22 for a small bowel obstruction and acute anemia with Hgb 5, underwent emergent ex-lap, hematoma found along colon. She was re-admitted 5/1-7 for melena, CT abdomen/pelvis without acute hemorrhage or fluid collection. EGD 5/01 \"There was patchy nodular mucosa in body of stomach which had mild oozing with irritation. Unlikely to be the cause of melana. Highest on the differential is most likely oozing AVM in the setting of DOAC and antiplatelet therapy. The patient received PPI IV on admission and was transitioned to PO PPI. Resumed clopidogrel 5/03 and apixaban 05/04. No bleeding was noted after resumption. Hgb at discharge: 8.5     Following her recent hospitalization, she spent 1 week in TCU and was discharged home. She has home health coming in 2 days a week and PT 2 days a week. She lives in a 4 level home - 7 steps to go up each floor. She is able to ambulate around the house with her walker, climbs the stairs using hand rails on both sides. She is " slowly improving but still feels weak, fatigued and decreased stamina. Also endorses shortness of breath, hard to determine if this is cardiac related or deconditioning. No orthopnea, PND, leg swelling, weight gain. No lightheadedness, palpitations or syncope. Per patient her BP is checked by home health and is pretty good. She is maintained on Plavix and Eliquis. No further tarry or bloody stools since 5/1/24.     Summary of CAD history:  10/27/2002: AMI s/p PCI+BMS (3.5x18mm Bx velocity) to mRCA  2/5/2003: Back pain; PCI+stent to mLCX c/b distal embolization/slow flow  4/11/2003: Unstable angina; PCI+stent (Hepacoat velocity) to mLAD  11/27/2007: PCI+DESx2 to pLAD (IVUS w/ calcification of LMCA and ulcerated plaque pLAD, 80% dRCA; also had 80% dLCX, too small to stent)  12/11/2007: Staged PCI. PCI+DESx1 (3.5x13mm Cypher) to dRCA (indefinite DAPT recommended at this time)  6/27/2008: Angina. mLCX stent 70% ISR. LAD and RCA stents patent. Myocardium at risk from LCX felt to be small, medical management preferred to PCI.  11/10/2009: Angina. Findings unchanged from 6/27/2008, FFR LAD 0.90. Medical management recommended.  7/23/2013: Unstable angina; PCI+ALVAREZ to mPDA. Diagnostic findings: LMCA 40% distal. LAD: pLAD stents patent mLAD 30% ISR dLAD 50% diffuse, D2 diffuse disease. LCX 80% mid ISR. RCA diffuse <30% mid, RPLAs diffuse disease, RPDA 100% occlusion.   5/27/2022: 90% dRCA in-stent -> PCI+DESx1 to dRCA.  50-60% pRCA (return for hemodynamic assessment if symptoms persist/recur) 50-60% small LCx (medical management recommended)  7/18/23: 80% ISR of dRCA --> PCI w/ALVAREZ x2, RPDA-2 lesion is 50% stenosed, RPDA-1 lesion is 65% stenosed, Ost LAD to Prox LAD lesion is 50% stenosed, Prox LAD to Mid LAD lesion is 40% stenosed, mLAD 70% stenosed, prox Cx to Mid Cx lesion is 55% stenosed small vessel.  11/6/23: LAD PCI with improvement in symptoms      PAST MEDICAL HISTORY:  Past Medical History:   Diagnosis Date    CAD  (coronary artery disease)     CAD s/p PCI+BMS to MRCA 10/2002, PCI to mLCX 2/2003, PCI+DESx2 to pLAD 11/2007, PCI+DESx1 to dRCA 12/2007, PCI+ALVAREZ to RPDA 7/2013; on indefinite DAPT  10/27/2002    10/27/2002: AMI s/p PCI+BMS (3.5x18mm Bx velocity) to mRCA 2/5/2003: Back pain; PCI+stent to mLCX c/b distal embolization/slow flow 4/11/2003: Unstable angina; PCI+stent (Hepacoat velocity) to mLAD 11/27/2007: PCI+DESx2 to pLAD (IVUS w/ calcification of LMCA and ulcerated plaque pLAD, 80% dRCA; also had 80% dLCX, too small to stent) 12/11/2007: Staged PCI. PCI+DESx1 (3.5x13mm Cypher) to dRCA (indefinite DAPT recommended at this time) 6/27/2008: Angina. mLCX stent 70% ISR. LAD and RCA stents patent. Myocardium at risk from LCX felt to be small, medical management preferred to PCI. 11/10/2009: Angina. Findings unchanged from 6/27/2008, FFR LAD 0.90. Medical management recommended. 7/23/2013: Unstable angina; PCI+ALVAREZ to mPDA. Diagnostic findings: LMCA 40% distal. LAD: pLAD stents patent mLAD 30% ISR dLAD 50% diffuse, D2 diffuse disease. LCX 80% mid ISR. RCA diffuse <30% mid, RPLAs diffuse disease, RPDA 100% occlusion.      Cardiac Pacemaker- Medtronic, dual chamber- NOT dependant 11/28/2007    CKD (chronic kidney disease), stage IV (H)     Hyperlipidemia LDL goal <70     Hypertension     Stented coronary artery 10-    RCA    Stented coronary artery 2-5-2003    LCx    Stented coronary artery 4-    LAD    Stented coronary artery 11-    LAD    Stented coronary artery 12-    RCA    Transient complete heart block (H) 11/28/2007       FAMILY HISTORY:  Family History   Problem Relation Age of Onset    Cancer Sister 82        bladder cancer       SOCIAL HISTORY:  Social History     Socioeconomic History    Marital status:     Number of children: 4   Occupational History     Employer: [employer]   Tobacco Use    Smoking status: Former     Current packs/day: 0.30     Average packs/day: 0.3  packs/day for 15.0 years (4.5 ttl pk-yrs)     Types: Cigarettes    Smokeless tobacco: Never    Tobacco comments:     Quit 35+ years ago   Substance and Sexual Activity    Drug use: No    Sexual activity: Not Currently     Partners: Male     Birth control/protection: Post-menopausal   Other Topics Concern    Blood Transfusions Yes    Exercise No     Social Determinants of Health     Financial Resource Strain: Low Risk  (11/15/2023)    Financial Resource Strain     Within the past 12 months, have you or your family members you live with been unable to get utilities (heat, electricity) when it was really needed?: No   Food Insecurity: Low Risk  (11/15/2023)    Food Insecurity     Within the past 12 months, did you worry that your food would run out before you got money to buy more?: No     Within the past 12 months, did the food you bought just not last and you didn t have money to get more?: No   Transportation Needs: Low Risk  (11/15/2023)    Transportation Needs     Within the past 12 months, has lack of transportation kept you from medical appointments, getting your medicines, non-medical meetings or appointments, work, or from getting things that you need?: No   Interpersonal Safety: Low Risk  (11/15/2023)    Interpersonal Safety     Do you feel physically and emotionally safe where you currently live?: Yes     Within the past 12 months, have you been hit, slapped, kicked or otherwise physically hurt by someone?: No     Within the past 12 months, have you been humiliated or emotionally abused in other ways by your partner or ex-partner?: No   Housing Stability: Low Risk  (11/15/2023)    Housing Stability     Do you have housing? : Yes     Are you worried about losing your housing?: No       CURRENT MEDICATIONS:  Current Outpatient Medications   Medication Sig Dispense Refill    acetaminophen (TYLENOL) 325 MG tablet Take 975 mg by mouth nightly as needed for pain      allopurinol (ZYLOPRIM) 100 MG tablet Take 1  tablet (100 mg) by mouth daily 90 tablet 0    amLODIPine (NORVASC) 10 MG tablet Take 1 tablet (10 mg) by mouth daily 90 tablet 3    apixaban ANTICOAGULANT (ELIQUIS) 2.5 MG tablet Take 1 tablet (2.5 mg) by mouth 2 times daily 180 tablet 3    budesonide (PULMICORT) 0.5 MG/2ML neb solution Take 2 mLs (0.5 mg) by nebulization 2 times daily 2 mL 3    clopidogrel (PLAVIX) 75 MG tablet Take 1 tablet (75 mg) by mouth daily 90 tablet 1    ferrous sulfate (FEROSUL) 325 (65 Fe) MG tablet Take 1 tablet by mouth 4 times weekly on Sunday, Tuesday, Thursday, and Saturday.      fluticasone-salmeterol (ADVAIR) 250-50 MCG/ACT inhaler INHALE 1 DOSE BY MOUTH TWICE DAILY 60 each 8    furosemide (LASIX) 40 MG tablet Take 40 mg by mouth daily May take an additional 40 mg at noon as needed for swelling.      isosorbide mononitrate (ISMO/MONOKET) 20 MG tablet Take 60 mg by mouth 2 times daily      methocarbamol (ROBAXIN) 500 MG tablet Take 1 tablet (500 mg) by mouth every 6 hours as needed for muscle spasms 30 tablet 0    metoprolol tartrate (LOPRESSOR) 25 MG tablet Take 1 tablet (25 mg) by mouth 2 times daily for 30 days 60 tablet 0    Multiple Vitamins-Minerals (PRESERVISION AREDS 2+MULTI VIT PO) Take 1 capsule by mouth 2 times daily      omeprazole (PRILOSEC) 40 MG DR capsule Take 1 capsule (40 mg) by mouth daily 90 capsule 3    potassium chloride ER (K-TAB/KLOR-CON) 10 MEQ CR tablet Take 1 tablet (10 mEq) by mouth daily 90 tablet 3    psyllium (METAMUCIL) 28.3 % packet Take 1 packet by mouth daily as needed for constipation      rosuvastatin (CRESTOR) 20 MG tablet Take 1 tablet (20 mg) by mouth daily for 360 days 90 tablet 3    timolol maleate (TIMOPTIC) 0.5 % ophthalmic solution INSTILL 1 DROP INTO EACH EYE ONCE DAILY IN THE MORNING      vitamin B-12 (CYANOCOBALAMIN) 500 MCG tablet Take 1 tablet by mouth daily      vitamin D3 25 mcg (1000 units) tablet Take 1 tablet (25 mcg) by mouth every other day 90 tablet 3     No current  facility-administered medications for this visit.       ROS:   Refer to HPI    EXAM:  /74 (BP Location: Left arm, Patient Position: Chair, Cuff Size: Adult Regular)   Pulse 89   Wt 65.9 kg (145 lb 3.2 oz)   SpO2 98%   BMI 23.44 kg/m    GENERAL: Appears comfortable, in no acute distress but does appear deconditioned  HEENT: Eye symmetrical, no discharge or icterus bilaterally. Mucous membranes moist and without lesions.  CV: RRR, no murmur, rub, or gallop. JVD normal   RESPIRATORY: Respirations regular, even, and unlabored. Lungs CTA throughout.   GI: Soft and non distended with normoactive bowel sounds present in all quadrants. No tenderness, rebound, guarding. No hepatomegaly.   EXTREMITIES: minimal peripheral edema. 2+ bilateral pedal pulses.   NEUROLOGIC: Alert and oriented x 3. No focal deficits.   MUSCULOSKELETAL: No joint swelling or tenderness.   SKIN: No jaundice. No rashes or lesions.     Labs, reviewed with patient in clinic today:  CBC RESULTS:  Lab Results   Component Value Date    WBC 5.2 05/14/2024    WBC 5.4 08/26/2020    RBC 3.47 (L) 05/14/2024    RBC 3.58 (L) 08/26/2020    HGB 9.7 (L) 05/14/2024    HGB 11.2 (L) 08/26/2020    HCT 31.6 (L) 05/14/2024    HCT 36.0 08/26/2020    MCV 91 05/14/2024     (H) 08/26/2020    MCH 28.0 05/14/2024    MCH 31.3 08/26/2020    MCHC 30.7 (L) 05/14/2024    MCHC 31.1 (L) 08/26/2020    RDW 20.2 (H) 05/14/2024    RDW 14.9 08/26/2020     (L) 05/14/2024     (L) 08/26/2020       CMP RESULTS:  Lab Results   Component Value Date     05/14/2024     08/26/2020    POTASSIUM 3.9 05/14/2024    POTASSIUM 4.0 04/13/2024    POTASSIUM 4.8 11/21/2022    POTASSIUM 4.4 08/26/2020    CHLORIDE 108 (H) 05/14/2024    CHLORIDE 110 (H) 11/21/2022    CHLORIDE 111 (H) 08/26/2020    CO2 21 (L) 05/14/2024    CO2 24 11/21/2022    CO2 24 08/26/2020    ANIONGAP 15 05/14/2024    ANIONGAP 6 11/21/2022    ANIONGAP 8 08/26/2020     (H) 05/14/2024      (H) 04/21/2024    GLC 79 11/21/2022    GLC 84 08/26/2020    BUN 28.2 (H) 05/14/2024    BUN 35 (H) 11/21/2022    BUN 41 (H) 08/26/2020    CR 1.93 (H) 05/14/2024    CR 1.70 (H) 08/26/2020    GFRESTIMATED 25 (L) 05/14/2024    GFRESTIMATED 27 (L) 08/26/2020    GFRESTBLACK 32 (L) 08/26/2020    ALEXANDRO 8.9 05/14/2024    ALEXANDRO 9.0 08/26/2020    BILITOTAL 0.3 05/05/2024    BILITOTAL 0.4 06/25/2018    ALBUMIN 3.4 (L) 05/05/2024    ALBUMIN 3.8 11/21/2022    ALBUMIN 3.6 08/26/2020    ALKPHOS 70 05/05/2024    ALKPHOS 104 06/25/2018    ALT 17 05/05/2024    ALT 14 06/25/2018    AST 30 05/05/2024    AST 15 06/25/2018        INR RESULTS:  Lab Results   Component Value Date    INR 1.30 (H) 05/02/2024    INR 1.08 12/27/2013       Lab Results   Component Value Date    MAG 1.7 05/07/2024    MAG 2.4 (H) 01/06/2020     Lab Results   Component Value Date    NTBNPI 2,161 (H) 05/06/2024     Lab Results   Component Value Date    NTBNP 1,423 06/28/2023       Diagnostics:    Cath 11/2023:  88F with severe CAD s/p multiple PCIs and known mid LAD stenosis who presents for likely percutaneous coronary intervention.     Pre Procedure Diagnosis    worsening angina       Conclusion         Prox LAD to Mid LAD lesion is 40% stenosed.    Ost LAD to Prox LAD lesion is 50% stenosed.    Prox Cx to Mid Cx lesion is 55% stenosed.    1st Diag lesion is 85% stenosed.    2nd Diag lesion is 70% stenosed.    RPDA-2 lesion is 50% stenosed.    RPDA-1 lesion is 65% stenosed.    Mid LAD lesion is 70% stenosed.     S/p ALVAREZ to mid LAD         Coronary Findings    Diagnostic  Dominance: Right  Left Anterior Descending   The vessel is small.   Ost LAD to Prox LAD lesion is 50% stenosed. The lesion was previously treated using a stent of unknown type.   Prox LAD to Mid LAD lesion is 40% stenosed. The lesion was previously treated using a stent of unknown type.   Mid LAD lesion is 70% stenosed.      First Diagonal Branch   The vessel is small. There is moderate diffuse disease  throughout the vessel.   1st Diag lesion is 85% stenosed.      Second Diagonal Branch   2nd Diag lesion is 70% stenosed.      Left Circumflex   The vessel is small.   Prox Cx to Mid Cx lesion is 55% stenosed. The lesion was previously treated using a stent of unknown type.      First Obtuse Marginal Branch   The vessel is large.      Second Obtuse Marginal Branch   The vessel is small.      Right Coronary Artery   The vessel is large.   Previously placed Mid RCA to Dist RCA drug-eluting and unknown type of stents are widely patent. The lesion was previously treated using angioplasty.   Previously placed Dist RCA drug-eluting and unknown type of stents are widely patent. The lesion was previously treated using angioplasty.      Right Posterior Descending Artery   There is mild diffuse disease throughout the vessel.   RPDA-1 lesion is 65% stenosed.   RPDA-2 lesion is 50% stenosed. The lesion was previously treated using a stent of unknown type.      Right Posterior Atrioventricular Artery   There is mild diffuse disease throughout the vessel.         Intervention     Mid LAD lesion   Stent   The pre-interventional distal flow is normal (BREANNA 3). A STENT CORONARY ALVAREZ SYNERGY XD MR US [id number] drug eluting stent was successfully placed. Post-stent angioplasty was performed. The post-interventional distal flow is normal (BREANNA 3).   There is a 0% residual stenosis post intervention.             Echocardiogram 10/2023:  Interpretation Summary  Global and regional left ventricular function is normal with an EF of 55-60%.  Right ventricular function, chamber size, wall motion, and thickness are  normal.  Mild to moderate mitral insufficiency is present.  Mild tricuspid insufficiency is present.  The right ventricular systolic pressure is 35mmHg above the right atrial  pressure.  The inferior vena cava is normal.  No pericardial effusion is present.    Device Interrogation 4/11/24:  Device: Pure Elegance TV [id number]  Beatrice DIA  Normal Device Function  Mode: AAIR<=>DDDR  bpm  AP: 68.0%  : <0.1%  Presenting EGM: AP-VS @ 60 bpm  Short V-V intervals: 0  Lead Trends Appear Stable.  Estimated battery longevity to RRT = 9.1 years. Battery voltage = 3.00V.   No Atrial/Ventricular Arrhythmias recorded.  Anticoagulant: apixaban  Pt Notified of Transmission Results: EPIC results letter mailed.  Plan: Automatic remote transmission as scheduled on 7/12/2024.  MALICK Kumar, Device Technician/ MAHESH Martinez RN    Assessment and Plan:   Ms. Mansfield is a 89 year old female with a PMH of CAD with multiple PCIs, sick sinus syndrome s/p dual-chamber PPM, paroxysmal atrial fibrillation, SBO s/p ex-lap, acute on chronic blood loss anemia, CKD IV, and renal hypertension.    # Coronary artery disease s/p multiple PCIs (most recent mLAD 11/2023)  # Chronic dyspnea  Dyspnea is patient anginal equivalent and had improved post PCI. Now reporting dyspnea but is very deconditioned following recent hospitalization, also anemic. Recommended she continue to rehab and let us know if dyspnea becomes worse at which point we would want to see her back.   - continue plavix 81 mg daily  - continue statin  - continue to monitor HIGGINS, contact us if it worsens     # Sick sinus syndrome s/p dual chamber PPM  Most recent device check 4/2024 shows a-paced 68% with no arrhythmias   - device clinic follow-up     # Moderate mitral regurgitation  # Mild tricuspid regurgitation  - Lasix 40mg daily  - Imdur daily + hydral 30mg TID for afterload reduction    # Paroxysmal atrial fibrillation   [id number] = 5  BB: Lopressor 25mg BID  AC: currently on DOAC     # Renal HTN  # CKD stage IV  Baseline creatinine 1.8-2.2, creat1.93 on Tuesday.   amlodipine 10 mg, Imdur 120 mg daily, hydralazine 30 mg TID   - continue lasix 40 mg daily, repeat BMP June 12th  -followed by Odilia Martin and Scar     # Acute on chronic anemia  Hospitalization in April 2024 for emergent SBO and  acute anemia with Hgb 5, ex-lap found mesenteric hematoma. Repeat admission several weeks later with melena, CT a/p showed no acute bleeding, thought likely oozing. Hgb stable at 8.   - Patient is at risk for recurrent GI bleeding on Plavix for CAD and DOAC for AF - HASBLED = 5   - recommend consideration of watchman once she fully recovers  - defer to primary cardiologist Dr. Santoyo  - Continue PPI therapy     Follow up: Cardiology in 6 months   Chart review time: 15 min  Patient visit time: 15 min  Total time: 30 min    JI Loyola, CNP  Structural Heart Nurse Practitioner  [facility]  Clinic: [phone]  Pager: [phone]

## 2024-05-20 NOTE — PATIENT INSTRUCTIONS
You were seen today in the Cardiovascular Clinic at the [facility].    Cardiology provider you saw during your visit Randa Ann NP.    Continue plavix and eliquis   2. As you continue to improve - we should consider Watchman MAIRA so we can get you off of Eliquis - please discuss with Dr. Gomez when you see him in    3. Have UA today and labs  at 11:00  4. Keep me updated on your breathing - suspect deconditioning but if it worsens I would like to see you back  5. Okay to see the dentist - they may want you to hold Eliquis 2 days prior which is fine with me - continue Plavix without interruption   6. Please follow-up with cardiology in 6 months     Questions and [phone].   First press #1 for the University and then press #3 for Medical Questions to reach the Cardiology triage nurse.     On Call Cardiologist for after hours or on weekends: [phone], press option #4 and ask to speak to the on-call Cardiologist.

## 2024-05-22 ENCOUNTER — TELEPHONE (OUTPATIENT)
Dept: FAMILY MEDICINE | Facility: CLINIC | Age: 89
End: 2024-05-22
Payer: COMMERCIAL

## 2024-05-22 NOTE — TELEPHONE ENCOUNTER
Left message for Gonzalo to call clinic back to discuss. Did not leave detailed verbal orders as voicemail box did not specify Gonzalo worked with any home care agency nor that it was a secure/confidential voicemail box.   Jordyn DUQUE LPN  [facility]

## 2024-05-22 NOTE — TELEPHONE ENCOUNTER
Spoke with Gonzalo with [facility] and gave the following verbal order(s): Home Care Orders: Physical Therapy (PT): 1 time a week for 3 weeks then every other week for 4 weeks.  Jordyn DUQUE LPN  [facility]

## 2024-05-28 ENCOUNTER — TELEPHONE (OUTPATIENT)
Dept: CARDIOLOGY | Facility: CLINIC | Age: 89
End: 2024-05-28

## 2024-05-28 ENCOUNTER — ANCILLARY PROCEDURE (OUTPATIENT)
Dept: CARDIOLOGY | Facility: CLINIC | Age: 89
End: 2024-05-28
Attending: INTERNAL MEDICINE
Payer: COMMERCIAL

## 2024-05-28 DIAGNOSIS — I49.5 SICK SINUS SYNDROME (H): ICD-10-CM

## 2024-05-28 PROCEDURE — 93294 REM INTERROG EVL PM/LDLS PM: CPT | Performed by: INTERNAL MEDICINE

## 2024-05-28 NOTE — TELEPHONE ENCOUNTER
Return phone call from Marni from [facility], [phone].   Marni sees Tessa mainly every Thursday. Marni says that patient continues to c/o exhaustion and fatigue. Tessa is still deconditioned and Marni encourages frequent movement throughout the day. Marni states that patient has several staircases in her home and is able to navigate without any issues she is aware of. Pt's breathing at rest is normal. However, Marni has not evaluated Tessa's breathing with activity. Marni has noted HR slow, in 40s via listening, irregular about every 3 beat or so. Tessa last BP, prior to medication admin, last week was 112/62. No edema to LE noted. Marni is worried that since patient lost         V/o for labs given. Informed Marni of plan for remote pacemaker interrogation. Will inform [facility] of any changes made to plan of care.           Masha Johnson, RN, BSN  Lead Structural Heart Coordinator  TAVR, MitraClip and Watchman

## 2024-05-28 NOTE — TELEPHONE ENCOUNTER
Attempted telephone call mainline [phone] [facility], no answer and unable to leave voicemail.    Electronically filed by Masha Johnson RN   5/28/2024  10:00 AM

## 2024-05-28 NOTE — TELEPHONE ENCOUNTER
Call back from [facility] with [facility]. She will have the nurse on for the day reach out to this write.     Masha Johnson RN, BSN  Lead Structural Heart Coordinator  TAVR, MitraClip and Watchman

## 2024-05-28 NOTE — TELEPHONE ENCOUNTER
Attempt telephone call to Gonzalo, [phone], to communicate lab orders. Previous listed as providing home care for patient. Left voicemail.       Electronically filed by Masha Johnson RN   5/28/2024  9:53 AM

## 2024-05-29 LAB
MDC_IDC_EPISODE_DTM: NORMAL
MDC_IDC_EPISODE_DURATION: 1 S
MDC_IDC_EPISODE_DURATION: 28 S
MDC_IDC_EPISODE_DURATION: 7 S
MDC_IDC_EPISODE_DURATION: NORMAL S
MDC_IDC_EPISODE_DURATION: NORMAL S
MDC_IDC_EPISODE_ID: 10
MDC_IDC_EPISODE_ID: 11
MDC_IDC_EPISODE_ID: 12
MDC_IDC_EPISODE_ID: 13
MDC_IDC_EPISODE_ID: 9
MDC_IDC_EPISODE_TYPE: NORMAL
MDC_IDC_LEAD_CONNECTION_STATUS: NORMAL
MDC_IDC_LEAD_CONNECTION_STATUS: NORMAL
MDC_IDC_LEAD_IMPLANT_DT: NORMAL
MDC_IDC_LEAD_IMPLANT_DT: NORMAL
MDC_IDC_LEAD_LOCATION: NORMAL
MDC_IDC_LEAD_LOCATION: NORMAL
MDC_IDC_LEAD_LOCATION_DETAIL_1: NORMAL
MDC_IDC_LEAD_LOCATION_DETAIL_1: NORMAL
MDC_IDC_LEAD_MFG: NORMAL
MDC_IDC_LEAD_MFG: NORMAL
MDC_IDC_LEAD_MODEL: NORMAL
MDC_IDC_LEAD_MODEL: NORMAL
MDC_IDC_LEAD_POLARITY_TYPE: NORMAL
MDC_IDC_LEAD_POLARITY_TYPE: NORMAL
MDC_IDC_LEAD_SERIAL: NORMAL
MDC_IDC_LEAD_SERIAL: NORMAL
MDC_IDC_LEAD_SPECIAL_FUNCTION: NORMAL
MDC_IDC_LEAD_SPECIAL_FUNCTION: NORMAL
MDC_IDC_MSMT_BATTERY_DTM: NORMAL
MDC_IDC_MSMT_BATTERY_REMAINING_LONGEVITY: 110 MO
MDC_IDC_MSMT_BATTERY_RRT_TRIGGER: 2.62
MDC_IDC_MSMT_BATTERY_STATUS: NORMAL
MDC_IDC_MSMT_BATTERY_VOLTAGE: 3 V
MDC_IDC_MSMT_LEADCHNL_RA_IMPEDANCE_VALUE: 323 OHM
MDC_IDC_MSMT_LEADCHNL_RA_IMPEDANCE_VALUE: 361 OHM
MDC_IDC_MSMT_LEADCHNL_RA_PACING_THRESHOLD_AMPLITUDE: 0.5 V
MDC_IDC_MSMT_LEADCHNL_RA_PACING_THRESHOLD_PULSEWIDTH: 0.4 MS
MDC_IDC_MSMT_LEADCHNL_RA_SENSING_INTR_AMPL: 3.38 MV
MDC_IDC_MSMT_LEADCHNL_RA_SENSING_INTR_AMPL: 3.38 MV
MDC_IDC_MSMT_LEADCHNL_RV_IMPEDANCE_VALUE: 380 OHM
MDC_IDC_MSMT_LEADCHNL_RV_IMPEDANCE_VALUE: 418 OHM
MDC_IDC_MSMT_LEADCHNL_RV_PACING_THRESHOLD_AMPLITUDE: 1 V
MDC_IDC_MSMT_LEADCHNL_RV_PACING_THRESHOLD_PULSEWIDTH: 0.4 MS
MDC_IDC_MSMT_LEADCHNL_RV_SENSING_INTR_AMPL: 8.38 MV
MDC_IDC_MSMT_LEADCHNL_RV_SENSING_INTR_AMPL: 8.38 MV
MDC_IDC_PG_IMPLANT_DTM: NORMAL
MDC_IDC_PG_MFG: NORMAL
MDC_IDC_PG_MODEL: NORMAL
MDC_IDC_PG_SERIAL: NORMAL
MDC_IDC_PG_TYPE: NORMAL
MDC_IDC_SESS_CLINIC_NAME: NORMAL
MDC_IDC_SESS_DTM: NORMAL
MDC_IDC_SESS_TYPE: NORMAL
MDC_IDC_SET_BRADY_AT_MODE_SWITCH_RATE: 171 {BEATS}/MIN
MDC_IDC_SET_BRADY_HYSTRATE: NORMAL
MDC_IDC_SET_BRADY_LOWRATE: 60 {BEATS}/MIN
MDC_IDC_SET_BRADY_MAX_SENSOR_RATE: 130 {BEATS}/MIN
MDC_IDC_SET_BRADY_MAX_TRACKING_RATE: 130 {BEATS}/MIN
MDC_IDC_SET_BRADY_MODE: NORMAL
MDC_IDC_SET_BRADY_PAV_DELAY_LOW: 180 MS
MDC_IDC_SET_BRADY_SAV_DELAY_LOW: 150 MS
MDC_IDC_SET_LEADCHNL_RA_PACING_AMPLITUDE: 1.5 V
MDC_IDC_SET_LEADCHNL_RA_PACING_ANODE_ELECTRODE_1: NORMAL
MDC_IDC_SET_LEADCHNL_RA_PACING_ANODE_LOCATION_1: NORMAL
MDC_IDC_SET_LEADCHNL_RA_PACING_CAPTURE_MODE: NORMAL
MDC_IDC_SET_LEADCHNL_RA_PACING_CATHODE_ELECTRODE_1: NORMAL
MDC_IDC_SET_LEADCHNL_RA_PACING_CATHODE_LOCATION_1: NORMAL
MDC_IDC_SET_LEADCHNL_RA_PACING_POLARITY: NORMAL
MDC_IDC_SET_LEADCHNL_RA_PACING_PULSEWIDTH: 0.4 MS
MDC_IDC_SET_LEADCHNL_RA_SENSING_ANODE_ELECTRODE_1: NORMAL
MDC_IDC_SET_LEADCHNL_RA_SENSING_ANODE_LOCATION_1: NORMAL
MDC_IDC_SET_LEADCHNL_RA_SENSING_CATHODE_ELECTRODE_1: NORMAL
MDC_IDC_SET_LEADCHNL_RA_SENSING_CATHODE_LOCATION_1: NORMAL
MDC_IDC_SET_LEADCHNL_RA_SENSING_POLARITY: NORMAL
MDC_IDC_SET_LEADCHNL_RA_SENSING_SENSITIVITY: 0.3 MV
MDC_IDC_SET_LEADCHNL_RV_PACING_AMPLITUDE: 2 V
MDC_IDC_SET_LEADCHNL_RV_PACING_ANODE_ELECTRODE_1: NORMAL
MDC_IDC_SET_LEADCHNL_RV_PACING_ANODE_LOCATION_1: NORMAL
MDC_IDC_SET_LEADCHNL_RV_PACING_CAPTURE_MODE: NORMAL
MDC_IDC_SET_LEADCHNL_RV_PACING_CATHODE_ELECTRODE_1: NORMAL
MDC_IDC_SET_LEADCHNL_RV_PACING_CATHODE_LOCATION_1: NORMAL
MDC_IDC_SET_LEADCHNL_RV_PACING_POLARITY: NORMAL
MDC_IDC_SET_LEADCHNL_RV_PACING_PULSEWIDTH: 0.4 MS
MDC_IDC_SET_LEADCHNL_RV_SENSING_ANODE_ELECTRODE_1: NORMAL
MDC_IDC_SET_LEADCHNL_RV_SENSING_ANODE_LOCATION_1: NORMAL
MDC_IDC_SET_LEADCHNL_RV_SENSING_CATHODE_ELECTRODE_1: NORMAL
MDC_IDC_SET_LEADCHNL_RV_SENSING_CATHODE_LOCATION_1: NORMAL
MDC_IDC_SET_LEADCHNL_RV_SENSING_POLARITY: NORMAL
MDC_IDC_SET_LEADCHNL_RV_SENSING_SENSITIVITY: 0.9 MV
MDC_IDC_SET_ZONE_DETECTION_INTERVAL: 350 MS
MDC_IDC_SET_ZONE_DETECTION_INTERVAL: 400 MS
MDC_IDC_SET_ZONE_STATUS: NORMAL
MDC_IDC_SET_ZONE_STATUS: NORMAL
MDC_IDC_SET_ZONE_TYPE: NORMAL
MDC_IDC_SET_ZONE_VENDOR_TYPE: NORMAL
MDC_IDC_STAT_AT_BURDEN_PERCENT: 6.6 %
MDC_IDC_STAT_AT_DTM_END: NORMAL
MDC_IDC_STAT_AT_DTM_START: NORMAL
MDC_IDC_STAT_BRADY_AP_VP_PERCENT: 0.02 %
MDC_IDC_STAT_BRADY_AP_VS_PERCENT: 25.13 %
MDC_IDC_STAT_BRADY_AS_VP_PERCENT: 0.04 %
MDC_IDC_STAT_BRADY_AS_VS_PERCENT: 74.83 %
MDC_IDC_STAT_BRADY_DTM_END: NORMAL
MDC_IDC_STAT_BRADY_DTM_START: NORMAL
MDC_IDC_STAT_BRADY_RA_PERCENT_PACED: 27.5 %
MDC_IDC_STAT_BRADY_RV_PERCENT_PACED: 0.12 %
MDC_IDC_STAT_EPISODE_RECENT_COUNT: 0
MDC_IDC_STAT_EPISODE_RECENT_COUNT: 0
MDC_IDC_STAT_EPISODE_RECENT_COUNT: 1
MDC_IDC_STAT_EPISODE_RECENT_COUNT: 1
MDC_IDC_STAT_EPISODE_RECENT_COUNT: 3
MDC_IDC_STAT_EPISODE_RECENT_COUNT_DTM_END: NORMAL
MDC_IDC_STAT_EPISODE_RECENT_COUNT_DTM_START: NORMAL
MDC_IDC_STAT_EPISODE_TOTAL_COUNT: 0
MDC_IDC_STAT_EPISODE_TOTAL_COUNT: 0
MDC_IDC_STAT_EPISODE_TOTAL_COUNT: 1
MDC_IDC_STAT_EPISODE_TOTAL_COUNT: 1
MDC_IDC_STAT_EPISODE_TOTAL_COUNT: 10
MDC_IDC_STAT_EPISODE_TOTAL_COUNT_DTM_END: NORMAL
MDC_IDC_STAT_EPISODE_TOTAL_COUNT_DTM_START: NORMAL
MDC_IDC_STAT_EPISODE_TYPE: NORMAL
MDC_IDC_STAT_TACHYTHERAPY_RECENT_DTM_END: NORMAL
MDC_IDC_STAT_TACHYTHERAPY_RECENT_DTM_START: NORMAL
MDC_IDC_STAT_TACHYTHERAPY_TOTAL_DTM_END: NORMAL
MDC_IDC_STAT_TACHYTHERAPY_TOTAL_DTM_START: NORMAL

## 2024-05-30 ENCOUNTER — DOCUMENTATION ONLY (OUTPATIENT)
Dept: FAMILY MEDICINE | Facility: CLINIC | Age: 89
End: 2024-05-30

## 2024-05-30 ENCOUNTER — LAB REQUISITION (OUTPATIENT)
Dept: LAB | Facility: CLINIC | Age: 89
End: 2024-05-30
Payer: COMMERCIAL

## 2024-05-30 ENCOUNTER — CARE COORDINATION (OUTPATIENT)
Dept: CARDIOLOGY | Facility: CLINIC | Age: 89
End: 2024-05-30

## 2024-05-30 DIAGNOSIS — E87.6 HYPOKALEMIA: ICD-10-CM

## 2024-05-30 DIAGNOSIS — I35.0 SEVERE AORTIC STENOSIS: Primary | ICD-10-CM

## 2024-05-30 DIAGNOSIS — I13.0 HYPERTENSIVE HEART AND CHRONIC KIDNEY DISEASE WITH HEART FAILURE AND STAGE 1 THROUGH STAGE 4 CHRONIC KIDNEY DISEASE, OR UNSPECIFIED CHRONIC KIDNEY DISEASE (H): ICD-10-CM

## 2024-05-30 DIAGNOSIS — I50.22 CHRONIC SYSTOLIC HEART FAILURE (H): ICD-10-CM

## 2024-05-30 LAB
ALBUMIN SERPL BCG-MCNC: 4.1 G/DL (ref 3.5–5.2)
ALP SERPL-CCNC: 67 U/L (ref 40–150)
ALT SERPL W P-5'-P-CCNC: 16 U/L (ref 0–50)
ANION GAP SERPL CALCULATED.3IONS-SCNC: 14 MMOL/L (ref 7–15)
AST SERPL W P-5'-P-CCNC: 27 U/L (ref 0–45)
BASOPHILS # BLD AUTO: 0.1 10E3/UL (ref 0–0.2)
BASOPHILS NFR BLD AUTO: 1 %
BILIRUB SERPL-MCNC: 0.4 MG/DL
BUN SERPL-MCNC: 53.3 MG/DL (ref 8–23)
CALCIUM SERPL-MCNC: 9 MG/DL (ref 8.8–10.2)
CHLORIDE SERPL-SCNC: 109 MMOL/L (ref 98–107)
CREAT SERPL-MCNC: 2.09 MG/DL (ref 0.51–0.95)
DEPRECATED HCO3 PLAS-SCNC: 19 MMOL/L (ref 22–29)
EGFRCR SERPLBLD CKD-EPI 2021: 22 ML/MIN/1.73M2
EOSINOPHIL # BLD AUTO: 0.1 10E3/UL (ref 0–0.7)
EOSINOPHIL NFR BLD AUTO: 2 %
ERYTHROCYTE [DISTWIDTH] IN BLOOD BY AUTOMATED COUNT: 21.2 % (ref 10–15)
GLUCOSE SERPL-MCNC: 134 MG/DL (ref 70–99)
HCT VFR BLD AUTO: 32.4 % (ref 35–47)
HGB BLD-MCNC: 9.5 G/DL (ref 11.7–15.7)
HOLD SPECIMEN: NORMAL
IMM GRANULOCYTES # BLD: 0 10E3/UL
IMM GRANULOCYTES NFR BLD: 0 %
LYMPHOCYTES # BLD AUTO: 1.5 10E3/UL (ref 0.8–5.3)
LYMPHOCYTES NFR BLD AUTO: 34 %
MAGNESIUM SERPL-MCNC: 2.2 MG/DL (ref 1.7–2.3)
MCH RBC QN AUTO: 29.8 PG (ref 26.5–33)
MCHC RBC AUTO-ENTMCNC: 29.3 G/DL (ref 31.5–36.5)
MCV RBC AUTO: 102 FL (ref 78–100)
MONOCYTES # BLD AUTO: 0.8 10E3/UL (ref 0–1.3)
MONOCYTES NFR BLD AUTO: 17 %
NEUTROPHILS # BLD AUTO: 2 10E3/UL (ref 1.6–8.3)
NEUTROPHILS NFR BLD AUTO: 46 %
NRBC # BLD AUTO: 0 10E3/UL
NRBC BLD AUTO-RTO: 0 /100
PLATELET # BLD AUTO: 101 10E3/UL (ref 150–450)
POTASSIUM SERPL-SCNC: 3.3 MMOL/L (ref 3.4–5.3)
PROT SERPL-MCNC: 6.9 G/DL (ref 6.4–8.3)
RBC # BLD AUTO: 3.19 10E6/UL (ref 3.8–5.2)
SODIUM SERPL-SCNC: 142 MMOL/L (ref 135–145)
WBC # BLD AUTO: 4.5 10E3/UL (ref 4–11)

## 2024-05-30 PROCEDURE — 83735 ASSAY OF MAGNESIUM: CPT | Mod: ORL | Performed by: NURSE PRACTITIONER

## 2024-05-30 PROCEDURE — 85025 COMPLETE CBC W/AUTO DIFF WBC: CPT | Mod: ORL | Performed by: NURSE PRACTITIONER

## 2024-05-30 PROCEDURE — 80053 COMPREHEN METABOLIC PANEL: CPT | Mod: ORL | Performed by: NURSE PRACTITIONER

## 2024-05-30 RX ORDER — FUROSEMIDE 20 MG
20 TABLET ORAL DAILY
Qty: 90 TABLET | Refills: 3 | Status: SHIPPED | OUTPATIENT
Start: 2024-05-30 | End: 2024-06-06

## 2024-05-30 RX ORDER — POTASSIUM CHLORIDE 1500 MG/1
20 TABLET, EXTENDED RELEASE ORAL 2 TIMES DAILY
Qty: 3 TABLET | Refills: 0 | Status: SHIPPED | OUTPATIENT
Start: 2024-05-30 | End: 2024-06-06

## 2024-05-30 NOTE — PROGRESS NOTES
Type of Form Received: Plan of care 5/10-7/8    Form Received (Date) 5/30/24   Form Filled out Yes, faxed 6/4/24   Placed in provider folder Yes     Forms [id number], [id number] faxed 6/3/24

## 2024-05-30 NOTE — PROGRESS NOTES
Call back from Home care nurse, Marni. Communicated prior message in this encounter. Marni plans on communicating this information to Tessa's family who sets up her medications.        Masha Johnson, RN, BSN  Lead Structural Heart Coordinator  TAVR, MitraClip and Watchman

## 2024-05-30 NOTE — PROGRESS NOTES
Reviewed labs with Randa Ann.   Plan for:  Decrease lasix to 20 mg every day  Take 20 meq of K for 3 days then return to 10 meq  Repeat labs in 1 week  Continue to monitor weights and symptoms   Increase fluid       Left voicemail for home care nurse from [facility].

## 2024-05-30 NOTE — PROGRESS NOTES
Telephone call to Tessa to communicate verbal order communicated below.  All questioned answered at this time.        Masha Johnson RN  Lead Structural Heart Coordinator  TAVR, MitraClip and Watchman

## 2024-05-31 ENCOUNTER — MEDICAL CORRESPONDENCE (OUTPATIENT)
Dept: HEALTH INFORMATION MANAGEMENT | Facility: CLINIC | Age: 89
End: 2024-05-31
Payer: COMMERCIAL

## 2024-05-31 DIAGNOSIS — Z53.9 DIAGNOSIS NOT YET DEFINED: Primary | ICD-10-CM

## 2024-05-31 PROCEDURE — G0180 MD CERTIFICATION HHA PATIENT: HCPCS | Performed by: FAMILY MEDICINE

## 2024-06-06 ENCOUNTER — LAB REQUISITION (OUTPATIENT)
Dept: LAB | Facility: CLINIC | Age: 89
End: 2024-06-06
Payer: COMMERCIAL

## 2024-06-06 DIAGNOSIS — I13.0 HYPERTENSIVE HEART AND CHRONIC KIDNEY DISEASE WITH HEART FAILURE AND STAGE 1 THROUGH STAGE 4 CHRONIC KIDNEY DISEASE, OR UNSPECIFIED CHRONIC KIDNEY DISEASE (H): ICD-10-CM

## 2024-06-06 DIAGNOSIS — I50.22 CHRONIC SYSTOLIC HEART FAILURE (H): ICD-10-CM

## 2024-06-06 DIAGNOSIS — E87.6 HYPOKALEMIA: ICD-10-CM

## 2024-06-06 LAB
ALBUMIN SERPL BCG-MCNC: 4 G/DL (ref 3.5–5.2)
ALP SERPL-CCNC: 69 U/L (ref 40–150)
ALT SERPL W P-5'-P-CCNC: 35 U/L (ref 0–50)
ANION GAP SERPL CALCULATED.3IONS-SCNC: 12 MMOL/L (ref 7–15)
AST SERPL W P-5'-P-CCNC: 36 U/L (ref 0–45)
BILIRUB SERPL-MCNC: 0.3 MG/DL
BUN SERPL-MCNC: 38.5 MG/DL (ref 8–23)
CALCIUM SERPL-MCNC: 8.9 MG/DL (ref 8.8–10.2)
CHLORIDE SERPL-SCNC: 111 MMOL/L (ref 98–107)
CREAT SERPL-MCNC: 1.8 MG/DL (ref 0.51–0.95)
DEPRECATED HCO3 PLAS-SCNC: 18 MMOL/L (ref 22–29)
EGFRCR SERPLBLD CKD-EPI 2021: 27 ML/MIN/1.73M2
GLUCOSE SERPL-MCNC: 130 MG/DL (ref 70–99)
HOLD SPECIMEN: NORMAL
MAGNESIUM SERPL-MCNC: 2.1 MG/DL (ref 1.7–2.3)
POTASSIUM SERPL-SCNC: 3.6 MMOL/L (ref 3.4–5.3)
PROT SERPL-MCNC: 6.6 G/DL (ref 6.4–8.3)
SODIUM SERPL-SCNC: 141 MMOL/L (ref 135–145)

## 2024-06-06 PROCEDURE — 83735 ASSAY OF MAGNESIUM: CPT | Mod: ORL | Performed by: NURSE PRACTITIONER

## 2024-06-06 PROCEDURE — 80053 COMPREHEN METABOLIC PANEL: CPT | Mod: ORL | Performed by: NURSE PRACTITIONER

## 2024-06-06 RX ORDER — POTASSIUM CHLORIDE 1500 MG/1
20 TABLET, EXTENDED RELEASE ORAL DAILY
Qty: 90 TABLET | Refills: 3 | Status: SHIPPED | OUTPATIENT
Start: 2024-06-06

## 2024-06-06 RX ORDER — FUROSEMIDE 40 MG
40 TABLET ORAL DAILY
Qty: 90 TABLET | Refills: 2 | Status: SHIPPED | OUTPATIENT
Start: 2024-06-06

## 2024-06-08 DIAGNOSIS — I25.118 CORONARY ARTERY DISEASE OF NATIVE ARTERY OF NATIVE HEART WITH STABLE ANGINA PECTORIS (H): ICD-10-CM

## 2024-06-12 ENCOUNTER — LAB (OUTPATIENT)
Dept: LAB | Facility: CLINIC | Age: 89
End: 2024-06-12
Payer: COMMERCIAL

## 2024-06-12 ENCOUNTER — OFFICE VISIT (OUTPATIENT)
Dept: FAMILY MEDICINE | Facility: CLINIC | Age: 89
End: 2024-06-12
Payer: COMMERCIAL

## 2024-06-12 VITALS
OXYGEN SATURATION: 99 % | HEIGHT: 66 IN | DIASTOLIC BLOOD PRESSURE: 66 MMHG | BODY MASS INDEX: 23.45 KG/M2 | WEIGHT: 145.9 LBS | SYSTOLIC BLOOD PRESSURE: 121 MMHG | HEART RATE: 68 BPM

## 2024-06-12 DIAGNOSIS — I10 ESSENTIAL HYPERTENSION: ICD-10-CM

## 2024-06-12 DIAGNOSIS — I25.10 CORONARY ARTERY DISEASE INVOLVING NATIVE CORONARY ARTERY OF NATIVE HEART WITHOUT ANGINA PECTORIS: ICD-10-CM

## 2024-06-12 DIAGNOSIS — I25.118 CORONARY ARTERY DISEASE OF NATIVE ARTERY OF NATIVE HEART WITH STABLE ANGINA PECTORIS (H): ICD-10-CM

## 2024-06-12 DIAGNOSIS — D64.9 ANEMIA, UNSPECIFIED TYPE: ICD-10-CM

## 2024-06-12 DIAGNOSIS — M10.00 IDIOPATHIC GOUT, UNSPECIFIED CHRONICITY, UNSPECIFIED SITE: Primary | ICD-10-CM

## 2024-06-12 DIAGNOSIS — I50.22 CHRONIC SYSTOLIC HEART FAILURE (H): ICD-10-CM

## 2024-06-12 DIAGNOSIS — N18.4 CHRONIC KIDNEY DISEASE, STAGE IV (SEVERE) (H): ICD-10-CM

## 2024-06-12 LAB
ALBUMIN SERPL BCG-MCNC: 4.2 G/DL (ref 3.5–5.2)
ALP SERPL-CCNC: 63 U/L (ref 40–150)
ALT SERPL W P-5'-P-CCNC: 22 U/L (ref 0–50)
ANION GAP SERPL CALCULATED.3IONS-SCNC: 13 MMOL/L (ref 7–15)
AST SERPL W P-5'-P-CCNC: 31 U/L (ref 0–45)
BILIRUB SERPL-MCNC: 0.4 MG/DL
BUN SERPL-MCNC: 46.6 MG/DL (ref 8–23)
CALCIUM SERPL-MCNC: 9.2 MG/DL (ref 8.8–10.2)
CHLORIDE SERPL-SCNC: 111 MMOL/L (ref 98–107)
CREAT SERPL-MCNC: 1.86 MG/DL (ref 0.51–0.95)
DEPRECATED HCO3 PLAS-SCNC: 18 MMOL/L (ref 22–29)
EGFRCR SERPLBLD CKD-EPI 2021: 26 ML/MIN/1.73M2
ERYTHROCYTE [DISTWIDTH] IN BLOOD BY AUTOMATED COUNT: 20.3 % (ref 10–15)
FERRITIN SERPL-MCNC: 118 NG/ML (ref 11–328)
GLUCOSE SERPL-MCNC: 102 MG/DL (ref 70–99)
HCT VFR BLD AUTO: 26.2 % (ref 35–47)
HGB BLD-MCNC: 8 G/DL (ref 11.7–15.7)
IRON BINDING CAPACITY (ROCHE): 299 UG/DL (ref 240–430)
IRON SATN MFR SERPL: 26 % (ref 15–46)
IRON SERPL-MCNC: 78 UG/DL (ref 37–145)
MAGNESIUM SERPL-MCNC: 2.2 MG/DL (ref 1.7–2.3)
MCH RBC QN AUTO: 29.5 PG (ref 26.5–33)
MCHC RBC AUTO-ENTMCNC: 30.5 G/DL (ref 31.5–36.5)
MCV RBC AUTO: 97 FL (ref 78–100)
PHOSPHATE SERPL-MCNC: 4.1 MG/DL (ref 2.5–4.5)
PLATELET # BLD AUTO: 92 10E3/UL (ref 150–450)
POTASSIUM SERPL-SCNC: 4.3 MMOL/L (ref 3.4–5.3)
PROT SERPL-MCNC: 6.8 G/DL (ref 6.4–8.3)
RBC # BLD AUTO: 2.71 10E6/UL (ref 3.8–5.2)
RETICS # AUTO: 0.16 10E6/UL (ref 0.03–0.1)
RETICS/RBC NFR AUTO: 5.8 % (ref 0.5–2)
SODIUM SERPL-SCNC: 142 MMOL/L (ref 135–145)
WBC # BLD AUTO: 6.7 10E3/UL (ref 4–11)

## 2024-06-12 PROCEDURE — 80053 COMPREHEN METABOLIC PANEL: CPT | Performed by: PATHOLOGY

## 2024-06-12 PROCEDURE — 85027 COMPLETE CBC AUTOMATED: CPT | Performed by: PATHOLOGY

## 2024-06-12 PROCEDURE — 82728 ASSAY OF FERRITIN: CPT | Performed by: PATHOLOGY

## 2024-06-12 PROCEDURE — 83735 ASSAY OF MAGNESIUM: CPT | Performed by: PATHOLOGY

## 2024-06-12 PROCEDURE — 85045 AUTOMATED RETICULOCYTE COUNT: CPT | Performed by: PATHOLOGY

## 2024-06-12 PROCEDURE — 84100 ASSAY OF PHOSPHORUS: CPT | Performed by: PATHOLOGY

## 2024-06-12 PROCEDURE — 99215 OFFICE O/P EST HI 40 MIN: CPT | Performed by: FAMILY MEDICINE

## 2024-06-12 PROCEDURE — 83550 IRON BINDING TEST: CPT | Performed by: PATHOLOGY

## 2024-06-12 PROCEDURE — 36415 COLL VENOUS BLD VENIPUNCTURE: CPT | Performed by: PATHOLOGY

## 2024-06-12 PROCEDURE — 83540 ASSAY OF IRON: CPT | Performed by: PATHOLOGY

## 2024-06-12 PROCEDURE — G2211 COMPLEX E/M VISIT ADD ON: HCPCS | Performed by: FAMILY MEDICINE

## 2024-06-12 RX ORDER — RESPIRATORY SYNCYTIAL VIRUS VACCINE 120MCG/0.5
0.5 KIT INTRAMUSCULAR ONCE
Qty: 1 EACH | Refills: 0 | Status: CANCELLED | OUTPATIENT
Start: 2024-06-12 | End: 2024-06-12

## 2024-06-12 RX ORDER — METOPROLOL TARTRATE 25 MG/1
25 TABLET, FILM COATED ORAL 2 TIMES DAILY
Qty: 60 TABLET | Refills: 0 | Status: SHIPPED | OUTPATIENT
Start: 2024-06-12 | End: 2024-06-24

## 2024-06-12 RX ORDER — ALLOPURINOL 100 MG/1
100 TABLET ORAL DAILY
Qty: 90 TABLET | Refills: 0 | Status: SHIPPED | OUTPATIENT
Start: 2024-06-12

## 2024-06-12 RX ORDER — ISOSORBIDE MONONITRATE 20 MG/1
60 TABLET ORAL 2 TIMES DAILY
Qty: 180 TABLET | Refills: 3 | Status: ON HOLD | OUTPATIENT
Start: 2024-06-12 | End: 2024-08-09

## 2024-06-12 ASSESSMENT — ASTHMA QUESTIONNAIRES
QUESTION_4 LAST FOUR WEEKS HOW OFTEN HAVE YOU USED YOUR RESCUE INHALER OR NEBULIZER MEDICATION (SUCH AS ALBUTEROL): ONE OR TWO TIMES PER DAY
ACT_TOTALSCORE: 18
QUESTION_3 LAST FOUR WEEKS HOW OFTEN DID YOUR ASTHMA SYMPTOMS (WHEEZING, COUGHING, SHORTNESS OF BREATH, CHEST TIGHTNESS OR PAIN) WAKE YOU UP AT NIGHT OR EARLIER THAN USUAL IN THE MORNING: NOT AT ALL
QUESTION_5 LAST FOUR WEEKS HOW WOULD YOU RATE YOUR ASTHMA CONTROL: COMPLETELY CONTROLLED
QUESTION_2 LAST FOUR WEEKS HOW OFTEN HAVE YOU HAD SHORTNESS OF BREATH: MORE THAN ONCE A DAY
QUESTION_1 LAST FOUR WEEKS HOW MUCH OF THE TIME DID YOUR ASTHMA KEEP YOU FROM GETTING AS MUCH DONE AT WORK, SCHOOL OR AT HOME: NONE OF THE TIME
ACT_TOTALSCORE: 18

## 2024-06-12 NOTE — PROGRESS NOTES
Assessment & Plan     Idiopathic gout, unspecified chronicity, unspecified site  Helps, tolerated, no recent flare  - allopurinol (ZYLOPRIM) 100 MG tablet; Take 1 tablet (100 mg) by mouth daily    Anemia, unspecified type  See HPI. Discuss w/ renal role of CKD. If can, egd/colonoscopy. Recheck labs next week. Cont iron, red meat for now as reticing.   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 Recent CORe note rwvd. After visti I communicate w/ cardio team. They'll work on watchman eval and CHF monitoring, I notified of edema today.   - metoprolol tartrate (LOPRESSOR) 25 MG tablet; Take 1 tablet (25 mg) by mouth 2 times daily  - isosorbide mononitrate (ISMO/MONOKET) 20 MG tablet; Take 3 tablets (60 mg) by mouth 2 times daily  - Social Work Referral Specific Sites Only - See Locations in Order; Future  - Primary Care - Care Coordination Referral; Future    The longitudinal plan of care for the diagnosis(es)/condition(s) as documented were addressed during this visit. Due to the added complexity in care, I will continue to support Tessa in the subsequent management and with ongoing continuity of care.  45 minutes spent by me on the date of the encounter doing chart review, history and exam, documentation and further activities per the note    Return in about 1 month (around 7/12/2024).    Lashell Zarate is a 89 year old, presenting for the following health issues:  Follow Up (Pt here to follow up from hospital stay from March-May 2024 and to discuss medications.)      6/12/2024    11:40 AM   Additional Questions   Roomed by Alia CANTU   Accompanied by Taisha-daughter in law     History of Present Illness       Reason for visit:  Followup from hospital stay    She eats 2-3 servings of fruits and vegetables daily.She consumes 1 sweetened beverage(s) daily.She  exercises with enough effort to increase her heart rate 10 to 19 minutes per day.  She exercises with enough effort to increase her heart rate 3 or less days per week.   She is taking medications regularly.   Long talk:  One-polypharmacy; rvwd each med, use, pt/family would like to reduce meds, right now none can easily e stopped  Two-fatigue, paluino. Cardio notes rvwd, also I communiate w/ CORE team after she leaves today. They are monitoring diuretic needs, and working w/ renal MD on CKD, we discuss the need to balance fluid/ckd  Three-no GI bleed symptoms. Hgb down a bit. On two blood thinners (might stop one if can get watchman-also discussed, she'll review again w/ cardio), could be slow bleed. After reviewed w/ cardio after pt left, I've messaged/asked RN to call, to see if she'd be able to do colonosocpy (see inpt GI MD notes). Also if did colonosocy I'd ask for another EGD to accomplish all we can while she is there w/ possible GI bleed  Four-anemia: disucssed also CKD can contribute, can discuss at renal visit next week if epo candidate  Is on iron, iron counts wnl today, is reticing from GI blood loss in May, so will cont for now  Overall dispostion discussed, consider move [place], discussed SW can brainstorm w/ her, care coord role, she accepts these  She has very helpful loving supportive large family to assist      Past Medical History:   Diagnosis Date    CAD (coronary artery disease)     CAD s/p PCI+BMS to MRCA 10/2002, PCI to mLCX 2/2003, PCI+DESx2 to pLAD 11/2007, PCI+DESx1 to dRCA 12/2007, PCI+ALVAREZ to RPDA 7/2013; on indefinite DAPT  10/27/2002    10/27/2002: AMI s/p PCI+BMS (3.5x18mm Bx velocity) to mRCA 2/5/2003: Back pain; PCI+stent to mLCX c/b distal embolization/slow flow 4/11/2003: Unstable angina; PCI+stent (Hepacoat velocity) to mLAD 11/27/2007: PCI+DESx2 to pLAD (IVUS w/ calcification of LMCA and ulcerated plaque pLAD, 80% dRCA; also had 80% dLCX, too small to stent) 12/11/2007: Staged PCI.  PCI+DESx1 (3.5x13mm Cypher) to dRCA (indefinite DAPT recommended at this time) 6/27/2008: Angina. mLCX stent 70% ISR. LAD and RCA stents patent. Myocardium at risk from LCX felt to be small, medical management preferred to PCI. 11/10/2009: Angina. Findings unchanged from 6/27/2008, FFR LAD 0.90. Medical management recommended. 7/23/2013: Unstable angina; PCI+ALVAREZ to mPDA. Diagnostic findings: LMCA 40% distal. LAD: pLAD stents patent mLAD 30% ISR dLAD 50% diffuse, D2 diffuse disease. LCX 80% mid ISR. RCA diffuse <30% mid, RPLAs diffuse disease, RPDA 100% occlusion.      Cardiac Pacemaker- Medtronic, dual chamber- NOT dependant 11/28/2007    CKD (chronic kidney disease), stage IV (H)     Hyperlipidemia LDL goal <70     Hypertension     Stented coronary artery 10-    RCA    Stented coronary artery 2-5-2003    LCx    Stented coronary artery 4-    LAD    Stented coronary artery 11-    LAD    Stented coronary artery 12-    RCA    Transient complete heart block (H) 11/28/2007     Past Surgical History:   Procedure Laterality Date    CHOLECYSTECTOMY      CV CORONARY ANGIOGRAM N/A 6/17/2022    Procedure: Coronary Angiogram;  Surgeon: Constantine Macias MD;  Location: [facility] CARDIAC CATH LAB    CV CORONARY ANGIOGRAM N/A 7/17/2023    Procedure: Coronary Angiogram;  Surgeon: Constantine Macias MD;  Location:  HEART CARDIAC CATH LAB    CV PCI N/A 6/17/2022    Procedure: Percutaneous Coronary Intervention;  Surgeon: Constantine Macias MD;  Location:  HEART CARDIAC CATH LAB    CV PCI N/A 7/17/2023    Procedure: Percutaneous Coronary Intervention;  Surgeon: Constantine Macias MD;  Location:  HEART CARDIAC CATH LAB    CV PCI N/A 11/6/2023    Procedure: Percutaneous Coronary Intervention;  Surgeon: Constantine Macias MD;  Location:  HEART CARDIAC CATH LAB    CV RIGHT HEART CATH MEASUREMENTS RECORDED N/A 6/17/2022    Procedure: Right Heart Catheterization;  Surgeon: Gilberto  MD Constantine;  Location:  HEART CARDIAC CATH LAB    EP PACEMAKER N/A 10/15/2019    Procedure: EP PACEMAKER;  Surgeon: Anthony Zhu MD;  Location:  HEART CARDIAC CATH LAB    ESOPHAGOSCOPY, GASTROSCOPY, DUODENOSCOPY (EGD), COMBINED N/A 7/20/2023    Procedure: Esophagoscopy, gastroscopy, duodenoscopy (EGD), combined;  Surgeon: Bekah Dash DO;  Location:  GI    ESOPHAGOSCOPY, GASTROSCOPY, DUODENOSCOPY (EGD), COMBINED N/A 5/2/2024    Procedure: Esophagoscopy, gastroscopy, duodenoscopy (EGD), combined;  Surgeon: Tavo Donaldson MD;  Location:  GI    HC PPM INSERTION NEW/REPLACEMENT W/ ATRIAL&VENTRICULAR LEAD  11-    HYSTERECTOMY      LAPAROTOMY EXPLORATORY N/A 4/13/2024    Procedure: Laparotomy exploratory, Wound Vac Placement.;  Surgeon: Anthony Trammell MD;  Location: [facility] OR    LAPAROTOMY EXPLORATORY N/A 4/14/2024    Procedure: Abdominal Re-Exploration and Closure;  Surgeon: Anthony Trammell MD;  Location: [facility] OR    LAPAROTOMY EXPLORATORY N/A 4/13/2024    Procedure: Exploratory Laparotomy;  Surgeon: Anthony Trammell MD;  Location:  OR     Current Outpatient Medications   Medication Sig Dispense Refill    acetaminophen (TYLENOL) 325 MG tablet Take 975 mg by mouth nightly as needed for pain      allopurinol (ZYLOPRIM) 100 MG tablet Take 1 tablet (100 mg) by mouth daily 90 tablet 0    amLODIPine (NORVASC) 10 MG tablet Take 1 tablet (10 mg) by mouth daily 90 tablet 3    apixaban ANTICOAGULANT (ELIQUIS) 2.5 MG tablet Take 1 tablet (2.5 mg) by mouth 2 times daily 180 tablet 3    budesonide (PULMICORT) 0.5 MG/2ML neb solution Take 2 mLs (0.5 mg) by nebulization 2 times daily 2 mL 3    clopidogrel (PLAVIX) 75 MG tablet Take 1 tablet (75 mg) by mouth daily 90 tablet 1    ferrous sulfate (FEROSUL) 325 (65 Fe) MG tablet Take 1 tablet by mouth 4 times weekly on Sunday, Tuesday, Thursday, and Saturday.      fluticasone-salmeterol (ADVAIR) 250-50 MCG/ACT inhaler INHALE 1 DOSE BY  "MOUTH TWICE DAILY 60 each 8    furosemide (LASIX) 40 MG tablet Take 1 tablet (40 mg) by mouth daily May take an additional 20 mg at noon as needed for swelling. 90 tablet 2    isosorbide mononitrate (ISMO/MONOKET) 20 MG tablet Take 3 tablets (60 mg) by mouth 2 times daily 180 tablet 3    methocarbamol (ROBAXIN) 500 MG tablet Take 1 tablet (500 mg) by mouth every 6 hours as needed for muscle spasms 30 tablet 0    metoprolol tartrate (LOPRESSOR) 25 MG tablet Take 1 tablet (25 mg) by mouth 2 times daily 60 tablet 0    Multiple Vitamins-Minerals (PRESERVISION AREDS 2+MULTI VIT PO) Take 1 capsule by mouth 2 times daily      omeprazole (PRILOSEC) 40 MG DR capsule Take 1 capsule (40 mg) by mouth daily 90 capsule 3    potassium chloride ER (K-TAB) 20 MEQ CR tablet Take 1 tablet (20 mEq) by mouth daily 90 tablet 3    psyllium (METAMUCIL) 28.3 % packet Take 1 packet by mouth daily as needed for constipation      rosuvastatin (CRESTOR) 20 MG tablet Take 1 tablet (20 mg) by mouth daily for 360 days 90 tablet 3    timolol maleate (TIMOPTIC) 0.5 % ophthalmic solution INSTILL 1 DROP INTO EACH EYE ONCE DAILY IN THE MORNING      vitamin B-12 (CYANOCOBALAMIN) 500 MCG tablet Take 1 tablet by mouth daily      vitamin D3 25 mcg (1000 units) tablet Take 1 tablet (25 mcg) by mouth every other day 90 tablet 3     No current facility-administered medications for this visit.     Allergies   Allergen Reactions    Codeine Sulfate Itching    Shrimp Swelling    Bactrim [Sulfamethoxazole W/Trimethoprim]      Patient unable to recall    Biaxin [Clarithromycin]     Chlorthalidone Nausea and Vomiting    Clonidine     Darvon [Propoxyphene Hcl]     Dilaudid [Hydromorphone] Visual Disturbance and Hallucination     Tolerated in April 2024 hospital admissions    Gabapentin Fatigue and Confusion     \"felt drunk\"    Indomethacin     Levaquin [Levofloxacin Hemihydrate]     Morphine Sulfate     Percocet [Oxycodone-Acetaminophen] Hallucination    Pregabalin " Itching and Fatigue    Simvastatin Palpitations     Muscle weakness, leg cramping      Spironolactone      Dehydrated      Terazosin      Family History   Problem Relation Age of Onset    Cancer Sister 82        bladder cancer     Social History     Socioeconomic History    Marital status:      Spouse name: Not on file    Number of children: 4    Years of education: Not on file    Highest education level: Not on file   Occupational History     Employer: [employer]   Tobacco Use    Smoking status: Former     Current packs/day: 0.30     Average packs/day: 0.3 packs/day for 15.0 years (4.5 ttl pk-yrs)     Types: Cigarettes    Smokeless tobacco: Never    Tobacco comments:     Quit 35+ years ago   Substance and Sexual Activity    Alcohol use: Not on file    Drug use: No    Sexual activity: Not Currently     Partners: Male     Birth control/protection: Post-menopausal   Other Topics Concern     Service Not Asked    Blood Transfusions Yes    Caffeine Concern Not Asked    Occupational Exposure Not Asked    Hobby Hazards Not Asked    Sleep Concern Not Asked    Stress Concern Not Asked    Weight Concern Not Asked    Special Diet Not Asked    Back Care Not Asked    Exercise No    Bike Helmet Not Asked    Seat Belt Not Asked    Self-Exams Not Asked    Parent/sibling w/ CABG, MI or angioplasty before 65F 55M? Not Asked   Social History Narrative    Not on file     Social Determinants of Health     Financial Resource Strain: Low Risk  (11/15/2023)    Financial Resource Strain     Within the past 12 months, have you or your family members you live with been unable to get utilities (heat, electricity) when it was really needed?: No   Food Insecurity: Low Risk  (11/15/2023)    Food Insecurity     Within the past 12 months, did you worry that your food would run out before you got money to buy more?: No     Within the past 12 months, did the food you bought just not last and you didn t have money to get  "more?: No   Transportation Needs: Low Risk  (11/15/2023)    Transportation Needs     Within the past 12 months, has lack of transportation kept you from medical appointments, getting your medicines, non-medical meetings or appointments, work, or from getting things that you need?: No   Physical Activity: Not on file   Stress: Not on file   Social Connections: Not on file   Interpersonal Safety: Low Risk  (11/15/2023)    Interpersonal Safety     Do you feel physically and emotionally safe where you currently live?: Yes     Within the past 12 months, have you been hit, slapped, kicked or otherwise physically hurt by someone?: No     Within the past 12 months, have you been humiliated or emotionally abused in other ways by your partner or ex-partner?: No   Housing Stability: Low Risk  (11/15/2023)    Housing Stability     Do you have housing? : Yes     Are you worried about losing your housing?: No         Objective    /66 (BP Location: Right arm, Patient Position: Sitting, Cuff Size: Adult Regular)   Pulse 68   Ht 1.676 m (5' 5.98\")   Wt 66.2 kg (145 lb 14.4 oz)   SpO2 99%   BMI 23.56 kg/m    Body mass index is 23.56 kg/m .  Physical Exam   GENERAL: alert and no distress  NECK: no adenopathy, no asymmetry, masses, or scars  RESP: lungs clear to auscultation - no rales, rhonchi or wheezes  CV: regular rate and rhythm, normal S1 S2, no S3 or S4, no murmur, click or rub, no peripheral edema  ABDOMEN: soft, nontender, no hepatosplenomegaly, no masses and bowel sounds normal  MS: no gross musculoskeletal defects noted, one plus edema both feet            Signed Electronically by: Pedro Gomez MD    "

## 2024-06-13 ENCOUNTER — TELEPHONE (OUTPATIENT)
Dept: INTERNAL MEDICINE | Facility: CLINIC | Age: 89
End: 2024-06-13
Payer: COMMERCIAL

## 2024-06-13 ENCOUNTER — TRANSFERRED RECORDS (OUTPATIENT)
Dept: HEALTH INFORMATION MANAGEMENT | Facility: CLINIC | Age: 89
End: 2024-06-13
Payer: COMMERCIAL

## 2024-06-13 ENCOUNTER — PATIENT OUTREACH (OUTPATIENT)
Dept: CARE COORDINATION | Facility: CLINIC | Age: 89
End: 2024-06-13
Payer: COMMERCIAL

## 2024-06-13 DIAGNOSIS — D64.9 ANEMIA: Primary | ICD-10-CM

## 2024-06-13 DIAGNOSIS — N28.9 DECREASED RENAL FUNCTION: ICD-10-CM

## 2024-06-13 NOTE — TELEPHONE ENCOUNTER
Pt was informed the lab results and recommendations. She agreed with EGD/colonoscopy. She will have the labs done next Wednesday, 6/19/24.  Also she is aware cardiology will reach out to her.    Soon-Mi  ------------------------------------------------------------------------------        ----- Message from Pedro Gomez MD sent at 6/12/2024  4:18 PM CDT -----  See my lab message tried to say needs egd/colonoscopy    Also lets do cbc/bmp next week, ideally on Thursday am, or Wednesday next week ok too (only days I'm around next week)

## 2024-06-13 NOTE — PROGRESS NOTES
Clinic Care Coordination Contact    Situation: Patient chart reviewed by care coordinator.    Background: Referred by PCP for doing well considering many health issues, lots of family support, might need move to [place] on 6/13/24.     Assessment: [employer] message sent to patient offering CC services. Pt was contacted already by [facility] regarding facilitation of move to [place].    Plan/Recommendations: RN will await pt's reply for further outreach.     LUPE CALLE RN on 6/18/2024 at 11:24 AM    Addendum:  Pt is currently at ED. RN will attempt to outreach to patient tomorrow pending ED discharge.     LUPE CALLE RN on 6/19/2024 at 3:06 PM

## 2024-06-14 ENCOUNTER — PATIENT OUTREACH (OUTPATIENT)
Dept: CARE COORDINATION | Facility: CLINIC | Age: 89
End: 2024-06-14
Payer: COMMERCIAL

## 2024-06-14 NOTE — PROGRESS NOTES
Social Work - Intervention  [facility]  Data/Intervention:    Patient Name: Tessa Mansfield Goes By: Tessa GARCIAB/Age: 1935 (89 year old)     Visit Type: telephone  Referral Source: Dr. Gomez  Reason for Referral: Assistance in finding a senior living facility    Collaborated With:    -Patient     Psychosocial Information/Concerns:   spoke with Patient on the phone today. Patient reported she is currently working with a realtor to find a senior living facility. Patient reported having no additional questions or concerns at this time.      Intervention/Education/Resources Provided:   provided supportive listening and validation throughout conversation.  offered to provide Patient with information on resources like [facility] or [facility], but Patient was uninterested at this time saying that she is pleased with the job her realtor is doing.      Assessment/Plan:  Patient to continue working with her realtor.  will remain available in the future as needed.     Provided patient/family with contact information and availability.    Gaby Vogel [facility]  Clinical , Outpatient Specialty Clinics  [facility]  Direct Phone: [phone]

## 2024-06-15 RX ORDER — METOPROLOL TARTRATE 25 MG/1
25 TABLET, FILM COATED ORAL 2 TIMES DAILY
Qty: 60 TABLET | Refills: 0 | OUTPATIENT
Start: 2024-06-15

## 2024-06-17 ENCOUNTER — TELEPHONE (OUTPATIENT)
Dept: CARDIOLOGY | Facility: CLINIC | Age: 89
End: 2024-06-17

## 2024-06-17 NOTE — TELEPHONE ENCOUNTER
Tessa returned telephone call.  Reviewed plan for Hbg mentioned in prior note from this encounter, will contact her after lab appointment on 6/19. Tessa mentioned that Dr. Gomez wants to do another colonoscopy, but she doesn't want it. She has had no evidence of blood in stools, will discuss with Randa Ann.         Plan to schedule Tessa for watchman consult with Dr. Garcia and follow up with patient after Hbg check on 6/19.         aMsha Johnson, RN, BSN  Lead Structural Heart Coordinator  TAVR, [employer] and Watchman

## 2024-06-17 NOTE — TELEPHONE ENCOUNTER
Attempted telephone call to aurora Zarate.      Plan to discuss watchman referral. Continuing to monitor Hbg if <8 then will have further discussion about holding eliquis (increase risk of stroke vs bleeding) and continue with Plavix only.               Masha Johnson RN, BSN  Lead Structural Heart Coordinator  TAV[employer], [employer] and Watchman

## 2024-06-19 ENCOUNTER — LAB (OUTPATIENT)
Dept: LAB | Facility: CLINIC | Age: 89
End: 2024-06-19
Payer: COMMERCIAL

## 2024-06-19 ENCOUNTER — TELEPHONE (OUTPATIENT)
Dept: INTERNAL MEDICINE | Facility: CLINIC | Age: 89
End: 2024-06-19

## 2024-06-19 ENCOUNTER — HOSPITAL ENCOUNTER (OUTPATIENT)
Facility: CLINIC | Age: 89
Setting detail: OBSERVATION
Discharge: HOME | End: 2024-06-20
Attending: FAMILY MEDICINE | Admitting: STUDENT IN AN ORGANIZED HEALTH CARE EDUCATION/TRAINING PROGRAM
Payer: COMMERCIAL

## 2024-06-19 ENCOUNTER — CARE COORDINATION (OUTPATIENT)
Dept: CARDIOLOGY | Facility: CLINIC | Age: 89
End: 2024-06-19

## 2024-06-19 ENCOUNTER — TELEPHONE (OUTPATIENT)
Dept: FAMILY MEDICINE | Facility: CLINIC | Age: 89
End: 2024-06-19

## 2024-06-19 DIAGNOSIS — Z79.01 LONG TERM (CURRENT) USE OF ANTICOAGULANTS: ICD-10-CM

## 2024-06-19 DIAGNOSIS — D64.9 ANEMIA, UNSPECIFIED TYPE: ICD-10-CM

## 2024-06-19 DIAGNOSIS — I10 ESSENTIAL HYPERTENSION: ICD-10-CM

## 2024-06-19 DIAGNOSIS — N18.4 CHRONIC KIDNEY DISEASE, STAGE IV (SEVERE) (H): ICD-10-CM

## 2024-06-19 DIAGNOSIS — I48.0 PAROXYSMAL ATRIAL FIBRILLATION (H): ICD-10-CM

## 2024-06-19 DIAGNOSIS — Z79.02 ANTIPLATELET OR ANTITHROMBOTIC LONG-TERM USE: ICD-10-CM

## 2024-06-19 DIAGNOSIS — D50.0 IRON DEFICIENCY ANEMIA DUE TO CHRONIC BLOOD LOSS: ICD-10-CM

## 2024-06-19 DIAGNOSIS — Z95.0 PACEMAKER: Primary | ICD-10-CM

## 2024-06-19 DIAGNOSIS — N28.9 DECREASED RENAL FUNCTION: ICD-10-CM

## 2024-06-19 DIAGNOSIS — K21.9 GASTROESOPHAGEAL REFLUX DISEASE WITHOUT ESOPHAGITIS: ICD-10-CM

## 2024-06-19 DIAGNOSIS — D64.9 ANEMIA: ICD-10-CM

## 2024-06-19 DIAGNOSIS — N18.31 STAGE 3A CHRONIC KIDNEY DISEASE (H): ICD-10-CM

## 2024-06-19 LAB
ABO/RH(D): NORMAL
ACANTHOCYTES BLD QL SMEAR: ABNORMAL
ALBUMIN SERPL BCG-MCNC: 4 G/DL (ref 3.5–5.2)
ALBUMIN UR-MCNC: NEGATIVE MG/DL
ALP SERPL-CCNC: 58 U/L (ref 40–150)
ALT SERPL W P-5'-P-CCNC: 19 U/L (ref 0–50)
ANION GAP SERPL CALCULATED.3IONS-SCNC: 13 MMOL/L (ref 7–15)
ANION GAP SERPL CALCULATED.3IONS-SCNC: 13 MMOL/L (ref 7–15)
ANTIBODY SCREEN: NEGATIVE
APPEARANCE UR: CLEAR
APTT PPP: 39 SECONDS (ref 22–38)
AST SERPL W P-5'-P-CCNC: 31 U/L (ref 0–45)
AUER BODIES BLD QL SMEAR: ABNORMAL
BACTERIA #/AREA URNS HPF: ABNORMAL /HPF
BASO STIPL BLD QL SMEAR: ABNORMAL
BASOPHILS # BLD AUTO: 0 10E3/UL (ref 0–0.2)
BASOPHILS # BLD AUTO: 0.1 10E3/UL (ref 0–0.2)
BASOPHILS NFR BLD AUTO: 1 %
BASOPHILS NFR BLD AUTO: 1 %
BILIRUB SERPL-MCNC: 0.4 MG/DL
BILIRUB UR QL STRIP: NEGATIVE
BITE CELLS BLD QL SMEAR: ABNORMAL
BLD PROD TYP BPU: NORMAL
BLD PROD TYP BPU: NORMAL
BLISTER CELLS BLD QL SMEAR: ABNORMAL
BLOOD COMPONENT TYPE: NORMAL
BLOOD COMPONENT TYPE: NORMAL
BUN SERPL-MCNC: 47.1 MG/DL (ref 8–23)
BUN SERPL-MCNC: 47.3 MG/DL (ref 8–23)
BURR CELLS BLD QL SMEAR: ABNORMAL
CALCIUM SERPL-MCNC: 8.8 MG/DL (ref 8.8–10.2)
CALCIUM SERPL-MCNC: 8.9 MG/DL (ref 8.8–10.2)
CHLORIDE SERPL-SCNC: 113 MMOL/L (ref 98–107)
CHLORIDE SERPL-SCNC: 113 MMOL/L (ref 98–107)
CODING SYSTEM: NORMAL
CODING SYSTEM: NORMAL
COLOR UR AUTO: ABNORMAL
CREAT SERPL-MCNC: 2 MG/DL (ref 0.51–0.95)
CREAT SERPL-MCNC: 2.06 MG/DL (ref 0.51–0.95)
CROSSMATCH: NORMAL
CROSSMATCH: NORMAL
DACRYOCYTES BLD QL SMEAR: ABNORMAL
DEPRECATED HCO3 PLAS-SCNC: 16 MMOL/L (ref 22–29)
DEPRECATED HCO3 PLAS-SCNC: 17 MMOL/L (ref 22–29)
EGFRCR SERPLBLD CKD-EPI 2021: 23 ML/MIN/1.73M2
EGFRCR SERPLBLD CKD-EPI 2021: 24 ML/MIN/1.73M2
ELLIPTOCYTES BLD QL SMEAR: ABNORMAL
EOSINOPHIL # BLD AUTO: 0 10E3/UL (ref 0–0.7)
EOSINOPHIL # BLD AUTO: 0.1 10E3/UL (ref 0–0.7)
EOSINOPHIL NFR BLD AUTO: 1 %
EOSINOPHIL NFR BLD AUTO: 1 %
ERYTHROCYTE [DISTWIDTH] IN BLOOD BY AUTOMATED COUNT: 19.9 % (ref 10–15)
ERYTHROCYTE [DISTWIDTH] IN BLOOD BY AUTOMATED COUNT: 20.3 % (ref 10–15)
FERRITIN SERPL-MCNC: 93 NG/ML (ref 11–328)
FRAGMENTS BLD QL SMEAR: ABNORMAL
GLUCOSE SERPL-MCNC: 149 MG/DL (ref 70–99)
GLUCOSE SERPL-MCNC: 84 MG/DL (ref 70–99)
GLUCOSE UR STRIP-MCNC: NEGATIVE MG/DL
HCT VFR BLD AUTO: 22.6 % (ref 35–47)
HCT VFR BLD AUTO: 22.7 % (ref 35–47)
HGB BLD-MCNC: 6.6 G/DL (ref 11.7–15.7)
HGB BLD-MCNC: 6.8 G/DL (ref 11.7–15.7)
HGB BLD-MCNC: 8.2 G/DL (ref 11.7–15.7)
HGB C CRYSTALS: ABNORMAL
HGB UR QL STRIP: NEGATIVE
HOLD SPECIMEN: NORMAL
HOWELL-JOLLY BOD BLD QL SMEAR: ABNORMAL
HYALINE CASTS: 5 /LPF
IMM GRANULOCYTES # BLD: 0 10E3/UL
IMM GRANULOCYTES # BLD: 0 10E3/UL
IMM GRANULOCYTES NFR BLD: 0 %
IMM GRANULOCYTES NFR BLD: 1 %
INR PPP: 1.59 (ref 0.85–1.15)
IRON BINDING CAPACITY (ROCHE): 282 UG/DL (ref 240–430)
IRON SATN MFR SERPL: 9 % (ref 15–46)
IRON SERPL-MCNC: 24 UG/DL (ref 37–145)
ISSUE DATE AND TIME: NORMAL
ISSUE DATE AND TIME: NORMAL
KETONES UR STRIP-MCNC: NEGATIVE MG/DL
LEUKOCYTE ESTERASE UR QL STRIP: NEGATIVE
LYMPHOCYTES # BLD AUTO: 1.5 10E3/UL (ref 0.8–5.3)
LYMPHOCYTES # BLD AUTO: 1.6 10E3/UL (ref 0.8–5.3)
LYMPHOCYTES NFR BLD AUTO: 27 %
LYMPHOCYTES NFR BLD AUTO: 28 %
MCH RBC QN AUTO: 30 PG (ref 26.5–33)
MCH RBC QN AUTO: 30.2 PG (ref 26.5–33)
MCHC RBC AUTO-ENTMCNC: 29.2 G/DL (ref 31.5–36.5)
MCHC RBC AUTO-ENTMCNC: 30 G/DL (ref 31.5–36.5)
MCV RBC AUTO: 101 FL (ref 78–100)
MCV RBC AUTO: 103 FL (ref 78–100)
MONOCYTES # BLD AUTO: 1.2 10E3/UL (ref 0–1.3)
MONOCYTES # BLD AUTO: 1.2 10E3/UL (ref 0–1.3)
MONOCYTES NFR BLD AUTO: 21 %
MONOCYTES NFR BLD AUTO: 22 %
MUCOUS THREADS #/AREA URNS LPF: PRESENT /LPF
NEUTROPHILS # BLD AUTO: 2.7 10E3/UL (ref 1.6–8.3)
NEUTROPHILS # BLD AUTO: 2.8 10E3/UL (ref 1.6–8.3)
NEUTROPHILS NFR BLD AUTO: 48 %
NEUTROPHILS NFR BLD AUTO: 49 %
NEUTS HYPERSEG BLD QL SMEAR: ABNORMAL
NITRATE UR QL: NEGATIVE
NRBC # BLD AUTO: 0 10E3/UL
NRBC # BLD AUTO: 0.1 10E3/UL
NRBC BLD AUTO-RTO: 1 /100
NRBC BLD AUTO-RTO: 1 /100
PH UR STRIP: 5 [PH] (ref 5–7)
PLAT MORPH BLD: ABNORMAL
PLATELET # BLD AUTO: 90 10E3/UL (ref 150–450)
PLATELET # BLD AUTO: 92 10E3/UL (ref 150–450)
POLYCHROMASIA BLD QL SMEAR: SLIGHT
POTASSIUM SERPL-SCNC: 4.3 MMOL/L (ref 3.4–5.3)
POTASSIUM SERPL-SCNC: 4.4 MMOL/L (ref 3.4–5.3)
PROT SERPL-MCNC: 6.4 G/DL (ref 6.4–8.3)
PTH-INTACT SERPL-MCNC: 84 PG/ML (ref 15–65)
RBC # BLD AUTO: 2.2 10E6/UL (ref 3.8–5.2)
RBC # BLD AUTO: 2.25 10E6/UL (ref 3.8–5.2)
RBC AGGLUT BLD QL: ABNORMAL
RBC MORPH BLD: ABNORMAL
RBC URINE: 0 /HPF
ROULEAUX BLD QL SMEAR: ABNORMAL
SICKLE CELLS BLD QL SMEAR: ABNORMAL
SMUDGE CELLS BLD QL SMEAR: ABNORMAL
SODIUM SERPL-SCNC: 142 MMOL/L (ref 135–145)
SODIUM SERPL-SCNC: 143 MMOL/L (ref 135–145)
SP GR UR STRIP: 1.01 (ref 1–1.03)
SPECIMEN EXPIRATION DATE: NORMAL
SPHEROCYTES BLD QL SMEAR: ABNORMAL
SQUAMOUS EPITHELIAL: <1 /HPF
STOMATOCYTES BLD QL SMEAR: ABNORMAL
TARGETS BLD QL SMEAR: ABNORMAL
TOXIC GRANULES BLD QL SMEAR: ABNORMAL
UNIT ABO/RH: NORMAL
UNIT ABO/RH: NORMAL
UNIT NUMBER: NORMAL
UNIT NUMBER: NORMAL
UNIT STATUS: NORMAL
UNIT STATUS: NORMAL
UNIT TYPE ISBT: 9500
UNIT TYPE ISBT: 9500
UROBILINOGEN UR STRIP-MCNC: NORMAL MG/DL
VARIANT LYMPHS BLD QL SMEAR: ABNORMAL
WBC # BLD AUTO: 5.5 10E3/UL (ref 4–11)
WBC # BLD AUTO: 5.7 10E3/UL (ref 4–11)
WBC URINE: 0 /HPF

## 2024-06-19 PROCEDURE — P9016 RBC LEUKOCYTES REDUCED: HCPCS | Performed by: FAMILY MEDICINE

## 2024-06-19 PROCEDURE — 93005 ELECTROCARDIOGRAM TRACING: CPT | Performed by: FAMILY MEDICINE

## 2024-06-19 PROCEDURE — 82728 ASSAY OF FERRITIN: CPT

## 2024-06-19 PROCEDURE — 96374 THER/PROPH/DIAG INJ IV PUSH: CPT

## 2024-06-19 PROCEDURE — 83970 ASSAY OF PARATHORMONE: CPT

## 2024-06-19 PROCEDURE — 80048 BASIC METABOLIC PNL TOTAL CA: CPT

## 2024-06-19 PROCEDURE — 36430 TRANSFUSION BLD/BLD COMPNT: CPT

## 2024-06-19 PROCEDURE — 83540 ASSAY OF IRON: CPT

## 2024-06-19 PROCEDURE — 250N000011 HC RX IP 250 OP 636: Mod: JZ

## 2024-06-19 PROCEDURE — 99207 PR APP CREDIT; MD BILLING SHARED VISIT: CPT | Mod: FS

## 2024-06-19 PROCEDURE — 36415 COLL VENOUS BLD VENIPUNCTURE: CPT

## 2024-06-19 PROCEDURE — 81001 URINALYSIS AUTO W/SCOPE: CPT

## 2024-06-19 PROCEDURE — 83550 IRON BINDING TEST: CPT

## 2024-06-19 PROCEDURE — 36415 COLL VENOUS BLD VENIPUNCTURE: CPT | Performed by: FAMILY MEDICINE

## 2024-06-19 PROCEDURE — 86900 BLOOD TYPING SEROLOGIC ABO: CPT | Performed by: FAMILY MEDICINE

## 2024-06-19 PROCEDURE — 99285 EMERGENCY DEPT VISIT HI MDM: CPT | Performed by: FAMILY MEDICINE

## 2024-06-19 PROCEDURE — 250N000013 HC RX MED GY IP 250 OP 250 PS 637

## 2024-06-19 PROCEDURE — 93010 ELECTROCARDIOGRAM REPORT: CPT | Performed by: FAMILY MEDICINE

## 2024-06-19 PROCEDURE — 120N000002 HC R&B MED SURG/OB UMMC

## 2024-06-19 PROCEDURE — C9113 INJ PANTOPRAZOLE SODIUM, VIA: HCPCS | Mod: JZ

## 2024-06-19 PROCEDURE — 85730 THROMBOPLASTIN TIME PARTIAL: CPT | Performed by: FAMILY MEDICINE

## 2024-06-19 PROCEDURE — 85004 AUTOMATED DIFF WBC COUNT: CPT | Performed by: FAMILY MEDICINE

## 2024-06-19 PROCEDURE — 99223 1ST HOSP IP/OBS HIGH 75: CPT | Mod: FS | Performed by: STUDENT IN AN ORGANIZED HEALTH CARE EDUCATION/TRAINING PROGRAM

## 2024-06-19 PROCEDURE — 99285 EMERGENCY DEPT VISIT HI MDM: CPT | Mod: 25

## 2024-06-19 PROCEDURE — 85018 HEMOGLOBIN: CPT

## 2024-06-19 PROCEDURE — 85025 COMPLETE CBC W/AUTO DIFF WBC: CPT

## 2024-06-19 PROCEDURE — 86923 COMPATIBILITY TEST ELECTRIC: CPT | Performed by: FAMILY MEDICINE

## 2024-06-19 PROCEDURE — 85610 PROTHROMBIN TIME: CPT | Performed by: FAMILY MEDICINE

## 2024-06-19 PROCEDURE — 82247 BILIRUBIN TOTAL: CPT | Performed by: FAMILY MEDICINE

## 2024-06-19 RX ORDER — ISOSORBIDE MONONITRATE 20 MG/1
60 TABLET ORAL 2 TIMES DAILY
Status: DISCONTINUED | OUTPATIENT
Start: 2024-06-19 | End: 2024-06-20 | Stop reason: HOSPADM

## 2024-06-19 RX ORDER — BUDESONIDE 0.5 MG/2ML
0.5 INHALANT ORAL 2 TIMES DAILY
Status: DISCONTINUED | OUTPATIENT
Start: 2024-06-20 | End: 2024-06-20 | Stop reason: HOSPADM

## 2024-06-19 RX ORDER — CLOPIDOGREL BISULFATE 75 MG/1
75 TABLET ORAL DAILY
Status: DISCONTINUED | OUTPATIENT
Start: 2024-06-20 | End: 2024-06-20 | Stop reason: HOSPADM

## 2024-06-19 RX ORDER — LIDOCAINE 40 MG/G
CREAM TOPICAL
Status: DISCONTINUED | OUTPATIENT
Start: 2024-06-19 | End: 2024-06-20 | Stop reason: HOSPADM

## 2024-06-19 RX ORDER — METOPROLOL TARTRATE 25 MG/1
25 TABLET, FILM COATED ORAL 2 TIMES DAILY
Status: DISCONTINUED | OUTPATIENT
Start: 2024-06-19 | End: 2024-06-20 | Stop reason: HOSPADM

## 2024-06-19 RX ORDER — ONDANSETRON 2 MG/ML
4 INJECTION INTRAMUSCULAR; INTRAVENOUS EVERY 6 HOURS PRN
Status: DISCONTINUED | OUTPATIENT
Start: 2024-06-19 | End: 2024-06-20 | Stop reason: HOSPADM

## 2024-06-19 RX ORDER — METHOCARBAMOL 500 MG/1
500 TABLET, FILM COATED ORAL EVERY 6 HOURS PRN
Status: DISCONTINUED | OUTPATIENT
Start: 2024-06-19 | End: 2024-06-20 | Stop reason: HOSPADM

## 2024-06-19 RX ORDER — PROCHLORPERAZINE MALEATE 5 MG
5 TABLET ORAL EVERY 6 HOURS PRN
Status: DISCONTINUED | OUTPATIENT
Start: 2024-06-19 | End: 2024-06-20 | Stop reason: HOSPADM

## 2024-06-19 RX ORDER — BUDESONIDE 0.5 MG/2ML
0.5 INHALANT ORAL 2 TIMES DAILY
Status: DISCONTINUED | OUTPATIENT
Start: 2024-06-19 | End: 2024-06-19

## 2024-06-19 RX ORDER — FUROSEMIDE 40 MG
40 TABLET ORAL DAILY
Status: DISCONTINUED | OUTPATIENT
Start: 2024-06-19 | End: 2024-06-20 | Stop reason: HOSPADM

## 2024-06-19 RX ORDER — AMOXICILLIN 250 MG
2 CAPSULE ORAL 2 TIMES DAILY PRN
Status: DISCONTINUED | OUTPATIENT
Start: 2024-06-19 | End: 2024-06-20 | Stop reason: HOSPADM

## 2024-06-19 RX ORDER — CALCIUM CARBONATE 500 MG/1
1000 TABLET, CHEWABLE ORAL 4 TIMES DAILY PRN
Status: DISCONTINUED | OUTPATIENT
Start: 2024-06-19 | End: 2024-06-20 | Stop reason: HOSPADM

## 2024-06-19 RX ORDER — ALLOPURINOL 100 MG/1
100 TABLET ORAL DAILY
Status: DISCONTINUED | OUTPATIENT
Start: 2024-06-20 | End: 2024-06-20 | Stop reason: HOSPADM

## 2024-06-19 RX ORDER — ROSUVASTATIN CALCIUM 20 MG/1
20 TABLET, COATED ORAL DAILY
Status: DISCONTINUED | OUTPATIENT
Start: 2024-06-20 | End: 2024-06-20 | Stop reason: HOSPADM

## 2024-06-19 RX ORDER — ACETAMINOPHEN 650 MG/1
650 SUPPOSITORY RECTAL EVERY 4 HOURS PRN
Status: DISCONTINUED | OUTPATIENT
Start: 2024-06-19 | End: 2024-06-20 | Stop reason: HOSPADM

## 2024-06-19 RX ORDER — FLUTICASONE FUROATE AND VILANTEROL 100; 25 UG/1; UG/1
1 POWDER RESPIRATORY (INHALATION) DAILY
Status: DISCONTINUED | OUTPATIENT
Start: 2024-06-19 | End: 2024-06-20 | Stop reason: HOSPADM

## 2024-06-19 RX ORDER — AMLODIPINE BESYLATE 10 MG/1
10 TABLET ORAL DAILY
Status: DISCONTINUED | OUTPATIENT
Start: 2024-06-20 | End: 2024-06-20 | Stop reason: HOSPADM

## 2024-06-19 RX ORDER — SODIUM CHLORIDE, SODIUM LACTATE, POTASSIUM CHLORIDE, CALCIUM CHLORIDE 600; 310; 30; 20 MG/100ML; MG/100ML; MG/100ML; MG/100ML
INJECTION, SOLUTION INTRAVENOUS CONTINUOUS
Status: ACTIVE | OUTPATIENT
Start: 2024-06-19 | End: 2024-06-20

## 2024-06-19 RX ORDER — ACETAMINOPHEN 325 MG/1
650 TABLET ORAL EVERY 4 HOURS PRN
Status: DISCONTINUED | OUTPATIENT
Start: 2024-06-19 | End: 2024-06-20 | Stop reason: HOSPADM

## 2024-06-19 RX ORDER — SALIVA STIMULANT COMB. NO.3
1 SPRAY, NON-AEROSOL (ML) MUCOUS MEMBRANE 4 TIMES DAILY
Status: DISCONTINUED | OUTPATIENT
Start: 2024-06-19 | End: 2024-06-20 | Stop reason: HOSPADM

## 2024-06-19 RX ORDER — TIMOLOL MALEATE 5 MG/ML
1 SOLUTION/ DROPS OPHTHALMIC DAILY
Status: DISCONTINUED | OUTPATIENT
Start: 2024-06-20 | End: 2024-06-20 | Stop reason: HOSPADM

## 2024-06-19 RX ORDER — PROCHLORPERAZINE 25 MG
12.5 SUPPOSITORY, RECTAL RECTAL EVERY 12 HOURS PRN
Status: DISCONTINUED | OUTPATIENT
Start: 2024-06-19 | End: 2024-06-20 | Stop reason: HOSPADM

## 2024-06-19 RX ORDER — POLYETHYLENE GLYCOL 3350 17 G/17G
17 POWDER, FOR SOLUTION ORAL 2 TIMES DAILY PRN
Status: DISCONTINUED | OUTPATIENT
Start: 2024-06-19 | End: 2024-06-20 | Stop reason: HOSPADM

## 2024-06-19 RX ORDER — AMOXICILLIN 250 MG
1 CAPSULE ORAL 2 TIMES DAILY PRN
Status: DISCONTINUED | OUTPATIENT
Start: 2024-06-19 | End: 2024-06-20 | Stop reason: HOSPADM

## 2024-06-19 RX ORDER — ONDANSETRON 4 MG/1
4 TABLET, ORALLY DISINTEGRATING ORAL EVERY 6 HOURS PRN
Status: DISCONTINUED | OUTPATIENT
Start: 2024-06-19 | End: 2024-06-20 | Stop reason: HOSPADM

## 2024-06-19 RX ADMIN — METOPROLOL TARTRATE 25 MG: 25 TABLET, FILM COATED ORAL at 21:04

## 2024-06-19 RX ADMIN — ISOSORBIDE MONONITRATE 60 MG: 20 TABLET ORAL at 21:04

## 2024-06-19 RX ADMIN — PANTOPRAZOLE SODIUM 40 MG: 40 INJECTION, POWDER, FOR SOLUTION INTRAVENOUS at 21:04

## 2024-06-19 ASSESSMENT — ACTIVITIES OF DAILY LIVING (ADL)
ADLS_ACUITY_SCORE: 40

## 2024-06-19 ASSESSMENT — COLUMBIA-SUICIDE SEVERITY RATING SCALE - C-SSRS
1. IN THE PAST MONTH, HAVE YOU WISHED YOU WERE DEAD OR WISHED YOU COULD GO TO SLEEP AND NOT WAKE UP?: NO
6. HAVE YOU EVER DONE ANYTHING, STARTED TO DO ANYTHING, OR PREPARED TO DO ANYTHING TO END YOUR LIFE?: NO
2. HAVE YOU ACTUALLY HAD ANY THOUGHTS OF KILLING YOURSELF IN THE PAST MONTH?: NO

## 2024-06-19 NOTE — PROGRESS NOTES
Reviewed pt's new Hbg drop with Randa Ann. Randa Ann and pt's primary cardiologist. Dr. Santoyo has prior discussion for this possibility of a Hbg drop. The plan if Hbg <8 to stop patient's eliquis, after a elisabeth discussion with of risk of stroke vs ongoing bleeding.       Since pt's Hbg < 8:   Please have conversation with patient about increased stroke risk vs benefits of stopping eliquis, bleeding.   2. Stop pt's Eliquis. Continue on Plavix only.        For any questions please contact either Dr. Santoyo or Randa Ann, CHRISTOPHER..      Masha Johnson, RN, BSN  Lead Structural Heart Coordinator  TAVR, [employer]ra[employer] and Watchman

## 2024-06-19 NOTE — ED PROVIDER NOTES
[facility] ([facility])    6/19/24       ED PROVIDER NOTE   ED 19  History     Chief Complaint   Patient presents with    Abnormal Labs     The history is provided by the patient and medical records.     Tessa Mansfield is a 89 year old female with PMH notable for CAD s/p multiple PCIs (on Plavix), SSS s/p dual chamber pacemaker, paroxymal atrial fibrillation (on Eliquis), CKD, HTN, who presents to the Emergency Department with low HGB (6.5 vs. baseline at 8-9.5). She had recent hospitalization last month for melena and anemia in setting of exploratory laparotomy 2 months ago. She had routine follow-up labwork done today showing anemia with hemoglobin of 6.5, which is lower than ever. She was sent to the Emergency Department for possible blood transfusion. She is here with her , has no symptoms whatsoever. No chest pain or shortness of breath. No melanotic or bloody stools. No vaginal bleeding, hematuria, hemoptysis or any apparent sources of bleeding. Hasn't had lower endoscopy.    Epic records reviewed. Reviewed admission H&P and discharge note for hospitalization from 04/13-04/22/24 for small bowel obstruction s/p exploratory laparotomy x3. She was readmitted from 5/01-5/7/24 for melena with hemoglobin of 8.1 at time of admission. She received 1 unit pRBC. EGD on 5/01/24 showed patchy nodular mucosa in body of stomach which had mild oozing with irritation but unlikely to cause melena. 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 She received PPI IV and was transitioned to PO PPI. HGB at discharge was 8.5.    Past Medical History  Past Medical History:   Diagnosis Date    CAD (coronary artery disease)     CAD s/p PCI+BMS to MRCA 10/2002, PCI to mLCX 2/2003, PCI+DESx2 to pLAD 11/2007, PCI+DESx1 to dRCA 12/2007, PCI+ALVAREZ to RPDA 7/2013; on indefinite DAPT  10/27/2002    10/27/2002: [facility]  2/5/2003: Back pain; PCI+stent to mLCX c/b distal embolization/slow flow 4/11/2003: Unstable angina; PCI+stent (Hepacoat velocity) to mLAD 11/27/2007: PCI+DESx2 to pLAD (IVUS w/ calcification of LMCA and ulcerated plaque pLAD, 80% dRCA; also had 80% dLCX, too small to stent) 12/11/2007: Staged PCI. PCI+DESx1 (3.5x13mm Cypher) to dRCA (indefinite DAPT recommended at this time) 6/27/2008: Angina. mLCX stent 70% ISR. LAD and RCA stents patent. Myocardium at risk from LCX felt to be small, medical management preferred to PCI. 11/10/2009: Angina. Findings unchanged from 6/27/2008, FFR LAD 0.90. Medical management recommended. 7/23/2013: Unstable angina; PCI+ALVAREZ to mPDA. Diagnostic findings: LMCA 40% distal. LAD: pLAD stents patent mLAD 30% ISR dLAD 50% diffuse, D2 diffuse disease. LCX 80% mid ISR. RCA diffuse <30% mid, RPLAs diffuse disease, RPDA 100% occlusion.      Cardiac Pacemaker- Medtronic, dual chamber- NOT dependant 11/28/2007    CKD (chronic kidney disease), stage IV (H)     Hyperlipidemia LDL goal <70     Hypertension     Stented coronary artery 10-    RCA    Stented coronary artery 2-5-2003    LCx    Stented coronary artery 4-    LAD    Stented coronary artery 11-    LAD    Stented coronary artery 12-    RCA    Transient complete heart block (H) 11/28/2007     Past Surgical History:   Procedure Laterality Date    CHOLECYSTECTOMY      CV CORONARY ANGIOGRAM N/A 6/17/2022    Procedure: Coronary Angiogram;  Surgeon: Constantine Macias MD;  Location:  HEART CARDIAC CATH LAB    CV CORONARY ANGIOGRAM N/A 7/17/2023    Procedure: Coronary Angiogram;  Surgeon: Constantine Macias MD;  Location: [facility] CARDIAC CATH LAB    CV PCI N/A 6/17/2022    Procedure: Percutaneous Coronary Intervention;  Surgeon: Constantine Macias MD;  Location: [facility] CARDIAC CATH LAB    CV PCI N/A 7/17/2023    Procedure: Percutaneous Coronary Intervention;  Surgeon: Constantine Macias MD;   Location:  HEART CARDIAC CATH LAB    CV PCI N/A 11/6/2023    Procedure: Percutaneous Coronary Intervention;  Surgeon: Constantine Macias MD;  Location:  HEART CARDIAC CATH LAB    CV RIGHT HEART CATH MEASUREMENTS RECORDED N/A 6/17/2022    Procedure: Right Heart Catheterization;  Surgeon: Constantine Macias MD;  Location:  HEART CARDIAC CATH LAB    EP PACEMAKER N/A 10/15/2019    Procedure: EP PACEMAKER;  Surgeon: Anthony Zhu MD;  Location:  HEART CARDIAC CATH LAB    ESOPHAGOSCOPY, GASTROSCOPY, DUODENOSCOPY (EGD), COMBINED N/A 7/20/2023    Procedure: Esophagoscopy, gastroscopy, duodenoscopy (EGD), combined;  Surgeon: Bekah Dash DO;  [facility]    ESOPHAGOSCOPY, GASTROSCOPY, DUODENOSCOPY (EGD), COMBINED N/A 5/2/2024    Procedure: Esophagoscopy, gastroscopy, duodenoscopy (EGD), combined;  Surgeon: Tavo Donaldson MD;  [facility]    HC PPM INSERTION NEW/REPLACEMENT W/ ATRIAL&VENTRICULAR LEAD  11-    HYSTERECTOMY      LAPAROTOMY EXPLORATORY N/A 4/13/2024    Procedure: Laparotomy exploratory, Wound Vac Placement.;  Surgeon: Anthony Trammell MD;  [facility]    LAPAROTOMY EXPLORATORY N/A 4/14/2024    Procedure: Abdominal Re-Exploration and Closure;  Surgeon: Anthony Trammell MD;  [facility]    LAPAROTOMY EXPLORATORY N/A 4/13/2024    Procedure: Exploratory Laparotomy;  Surgeon: Anthony Trammell MD;  [facility]     acetaminophen (TYLENOL) 325 MG tablet  allopurinol (ZYLOPRIM) 100 MG tablet  amLODIPine (NORVASC) 10 MG tablet  apixaban ANTICOAGULANT (ELIQUIS) 2.5 MG tablet  budesonide (PULMICORT) 0.5 MG/2ML neb solution  clopidogrel (PLAVIX) 75 MG tablet  ferrous sulfate (FEROSUL) 325 (65 Fe) MG tablet  fluticasone-salmeterol (ADVAIR) 250-50 MCG/ACT inhaler  furosemide (LASIX) 40 MG tablet  isosorbide mononitrate (ISMO/MONOKET) 20 MG tablet  methocarbamol (ROBAXIN) 500 MG tablet  metoprolol tartrate (LOPRESSOR) 25 MG tablet  Multiple  "Vitamins-Minerals (PRESERVISION AREDS 2+MULTI VIT PO)  omeprazole (PRILOSEC) 40 MG DR capsule  potassium chloride ER (K-TAB) 20 MEQ CR tablet  rosuvastatin (CRESTOR) 20 MG tablet  timolol maleate (TIMOPTIC) 0.5 % ophthalmic solution  vitamin B-12 (CYANOCOBALAMIN) 500 MCG tablet  vitamin D3 25 mcg (1000 units) tablet      Allergies   Allergen Reactions    Codeine Sulfate Itching    Shrimp Swelling    Bactrim [Sulfamethoxazole W/Trimethoprim]      Patient unable to recall    Biaxin [Clarithromycin]     Chlorthalidone Nausea and Vomiting    Clonidine     Darvon [Propoxyphene Hcl]     Dilaudid [Hydromorphone] Visual Disturbance and Hallucination     Tolerated in April 2024 hospital admissions    Gabapentin Fatigue and Confusion     \"felt drunk\"    Indomethacin     Levaquin [Levofloxacin Hemihydrate]     Morphine Sulfate     Percocet [Oxycodone-Acetaminophen] Hallucination    Pregabalin Itching and Fatigue    Simvastatin Palpitations     Muscle weakness, leg cramping      Spironolactone      Dehydrated      Terazosin      Family History  Family History   Problem Relation Age of Onset    Cancer Sister 82        bladder cancer     Social History   Social History     Tobacco Use    Smoking status: Former     Current packs/day: 0.30     Average packs/day: 0.3 packs/day for 15.0 years (4.5 ttl pk-yrs)     Types: Cigarettes    Smokeless tobacco: Never    Tobacco comments:     Quit 35+ years ago   Substance Use Topics    Drug use: No      A medically appropriate review of systems was performed with pertinent positives and negatives noted in the HPI, and all other systems negative.    Physical Exam   BP: 119/72  Pulse: 72  Temp: 97.3  F (36.3  C)  Resp: 15  SpO2: 100 %    Wt Readings from Last 10 Encounters:   06/12/24 66.2 kg (145 lb 14.4 oz)   05/20/24 65.9 kg (145 lb 3.2 oz)   05/14/24 64.5 kg (142 lb 1.6 oz)   05/07/24 66.3 kg (146 lb 1.6 oz)   04/19/24 71.9 kg (158 lb 8.2 oz)   04/11/24 71 kg (156 lb 8 oz)   04/03/24 69.4 " kg (153 lb)   03/18/24 71.1 kg (156 lb 12.8 oz)   02/20/24 70.3 kg (155 lb)   02/19/24 70.3 kg (155 lb)     Physical Exam  Vitals and nursing note reviewed.   Constitutional:       General: She is in acute distress.      Appearance: Normal appearance. She is well-developed. She is not ill-appearing.      Comments: Patient here with  is alert and orient x 3.   HENT:      Head: Normocephalic and atraumatic.      Nose:      Comments: No sign of nosebleed     Mouth/Throat:      Mouth: Mucous membranes are moist.      Pharynx: Oropharynx is clear.   Eyes:      General: No scleral icterus.     Extraocular Movements: Extraocular movements intact.      Conjunctiva/sclera: Conjunctivae normal.      Pupils: Pupils are equal, round, and reactive to light.   Cardiovascular:      Rate and Rhythm: Normal rate and regular rhythm.   Pulmonary:      Effort: Pulmonary effort is normal. No respiratory distress.      Breath sounds: No stridor.   Abdominal:      General: Abdomen is flat. There is no distension.      Palpations: Abdomen is soft. There is no mass.      Tenderness: There is no abdominal tenderness. There is no guarding or rebound.   Musculoskeletal:         General: No tenderness, deformity or signs of injury.      Cervical back: Normal range of motion and neck supple. No rigidity.   Skin:     General: Skin is warm and dry.      Capillary Refill: Capillary refill takes less than 2 seconds.      Coloration: Skin is pale. Skin is not jaundiced.      Findings: No rash.      Comments: Patient pale otherwise no distress no bruising   Neurological:      General: No focal deficit present.      Mental Status: She is alert and oriented to person, place, and time. Mental status is at baseline.   Psychiatric:      Comments: Patient pleasant here in the ER         ED Course, Procedures, & Data       Records reviewed epic patient is on Plavix and Eliquis for history of paroxysmal A-fib.  Medication reviewed allergies reviewed  refer to [facility] for other past history reviewed in epic.    The ER did contact cardiology clinic their recommendation patient should be admitted at this point for further workup.    IV been established labs drawn and verified hemoglobin was 6.6.  Patient consented for transfusion of 2 units packed cells which were ordered.  Other labs noted here in the ER.  Sodium 143 potassium 3.5.  Bicarb is 17 creatinine 1.84.  Glucose 82.  Urinalysis without signs of acute infection.  Previous chemistry sodium 143 potassium 4.3.  Bicarb is 17 gap is 13.  Creatinine initially 2.06.  LFTs within normal limits.  INR noted to be 1.59.    Patient's notes per nursing cardiology recommended if hemoglobin less than 8 patient be transfused and holding patient's Eliquis but continue the Plavix.  I relayed this to medicine also.  Patient to the ER otherwise been stable.    ED Course as of 06/20/24 0739   Wed Jun 19, 2024   1524 Called and spoke with lab, they said that they would add on a type and screen     Procedures            EKG Interpretation:      Interpreted by Dima San MD  Time reviewed: 1420  Symptoms at time of EKG: anemia   Rhythm: Atrial paced rhythm.  Rate: normal  Axis: normal  Ectopy: none  Conduction: normal  ST Segments/ T Waves: Non specific ST-T wave changes  Q Waves: none  Comparison to prior: No old EKG available    Clinical Impression: Atrial paced rhythm            Results for orders placed or performed during the hospital encounter of 06/19/24   [place] Draw     Status: None    Narrative    The following orders were created for panel order [place] Draw.  Procedure                               Abnormality         Status                     ---------                               -----------         ------                     Extra Blue Top Tube[[id number]]                              Final result               Extra Red Top Tube[[id number]]                               Final result               Extra Green  Top (Lithium...[[id number]]                      Final result               Extra Purple Top Tube[[id number]]                            Final result               Extra Blood Bank Purple ...[[id number]]                      Final result                 Please view results for these tests on the individual orders.   Extra Blue Top Tube     Status: None   Result Value Ref Range    Hold Specimen JIC    Extra Red Top Tube     Status: None   Result Value Ref Range    Hold Specimen JIC    Extra Green Top (Lithium Heparin) Tube     Status: None   Result Value Ref Range    Hold Specimen JIC    Extra Purple Top Tube     Status: None   Result Value Ref Range    Hold Specimen JIC    Extra Blood Bank Purple Top Tube     Status: None   Result Value Ref Range    Hold Specimen JIC    Partial thromboplastin time     Status: Abnormal   Result Value Ref Range    aPTT 39 (H) 22 - 38 Seconds   INR     Status: Abnormal   Result Value Ref Range    INR 1.59 (H) 0.85 - 1.15   Comprehensive metabolic panel     Status: Abnormal   Result Value Ref Range    Sodium 143 135 - 145 mmol/L    Potassium 4.3 3.4 - 5.3 mmol/L    Carbon Dioxide (CO2) 17 (L) 22 - 29 mmol/L    Anion Gap 13 7 - 15 mmol/L    Urea Nitrogen 47.1 (H) 8.0 - 23.0 mg/dL    Creatinine 2.06 (H) 0.51 - 0.95 mg/dL    GFR Estimate 23 (L) >60 mL/min/1.73m2    Calcium 8.8 8.8 - 10.2 mg/dL    Chloride 113 (H) 98 - 107 mmol/L    Glucose 84 70 - 99 mg/dL    Alkaline Phosphatase 58 40 - 150 U/L    AST 31 0 - 45 U/L    ALT 19 0 - 50 U/L    Protein Total 6.4 6.4 - 8.3 g/dL    Albumin 4.0 3.5 - 5.2 g/dL    Bilirubin Total 0.4 <=1.2 mg/dL   UA with Microscopic reflex to Culture     Status: Abnormal    Specimen: Urine, Clean Catch   Result Value Ref Range    Color Urine Straw Colorless, Straw, Light Yellow, Yellow    Appearance Urine Clear Clear    Glucose Urine Negative Negative mg/dL    Bilirubin Urine Negative Negative    Ketones Urine Negative Negative mg/dL    Specific Gravity Urine 1.006  1.003 - 1.035    Blood Urine Negative Negative    pH Urine 5.0 5.0 - 7.0    Protein Albumin Urine Negative Negative mg/dL    Urobilinogen Urine Normal Normal, 2.0 mg/dL    Nitrite Urine Negative Negative    Leukocyte Esterase Urine Negative Negative    Bacteria Urine Few (A) None Seen /HPF    Mucus Urine Present (A) None Seen /LPF    RBC Urine 0 <=2 /HPF    WBC Urine 0 <=5 /HPF    Squamous Epithelials Urine <1 <=1 /HPF    Hyaline Casts Urine 5 (H) <=2 /LPF    Narrative    Urine Culture not indicated   CBC with platelets and differential     Status: Abnormal   Result Value Ref Range    WBC Count 5.7 4.0 - 11.0 10e3/uL    RBC Count 2.20 (L) 3.80 - 5.20 10e6/uL    Hemoglobin 6.6 (LL) 11.7 - 15.7 g/dL    Hematocrit 22.6 (L) 35.0 - 47.0 %     (H) 78 - 100 fL    MCH 30.0 26.5 - 33.0 pg    MCHC 29.2 (L) 31.5 - 36.5 g/dL    RDW 20.3 (H) 10.0 - 15.0 %    Platelet Count 92 (L) 150 - 450 10e3/uL    % Neutrophils 49 %    % Lymphocytes 27 %    % Monocytes 22 %    % Eosinophils 1 %    % Basophils 1 %    % Immature Granulocytes 0 %    NRBCs per 100 WBC 1 (H) <1 /100    Absolute Neutrophils 2.8 1.6 - 8.3 10e3/uL    Absolute Lymphocytes 1.5 0.8 - 5.3 10e3/uL    Absolute Monocytes 1.2 0.0 - 1.3 10e3/uL    Absolute Eosinophils 0.1 0.0 - 0.7 10e3/uL    Absolute Basophils 0.0 0.0 - 0.2 10e3/uL    Absolute Immature Granulocytes 0.0 <=0.4 10e3/uL    Absolute NRBCs 0.0 10e3/uL   [place] Draw     Status: None    Narrative    The following orders were created for panel order [place] Draw.  Procedure                               Abnormality         Status                     ---------                               -----------         ------                     Extra Blood Bank Purple ...[[id number]]                      Final result                 Please view results for these tests on the individual orders.   Extra Blood Bank Purple Top Tube     Status: None   Result Value Ref Range    Hold Specimen JIC    RBC and Platelet Morphology      Status: Abnormal   Result Value Ref Range    Platelet Assessment  Automated Count Confirmed. Platelet morphology is normal.     Automated Count Confirmed. Platelet morphology is normal.    Acanthocytes      Mariah Rods      Basophilic Stippling      Bite Cells      Blister Cells      [place] Cells      Elliptocytes      Hgb C Crystals      Weber-Jolly Bodies      Hypersegmented Neutrophils      Polychromasia Slight (A) None Seen    RBC agglutination      RBC Fragments      Reactive Lymphocytes      Rouleaux      Sickle Cells      Smudge Cells      Spherocytes      Stomatocytes      Target Cells      Teardrop Cells      Toxic Neutrophils      RBC Morphology Confirmed RBC Indices    Hemoglobin     Status: Abnormal   Result Value Ref Range    Hemoglobin 8.2 (L) 11.7 - 15.7 g/dL   Hemoglobin     Status: Abnormal   Result Value Ref Range    Hemoglobin 8.9 (L) 11.7 - 15.7 g/dL   CBC with platelets     Status: Abnormal   Result Value Ref Range    WBC Count 5.6 4.0 - 11.0 10e3/uL    RBC Count 2.96 (L) 3.80 - 5.20 10e6/uL    Hemoglobin 9.2 (L) 11.7 - 15.7 g/dL    Hematocrit 28.0 (L) 35.0 - 47.0 %    MCV 95 78 - 100 fL    MCH 31.1 26.5 - 33.0 pg    MCHC 32.9 31.5 - 36.5 g/dL    RDW 19.5 (H) 10.0 - 15.0 %    Platelet Count 76 (L) 150 - 450 10e3/uL   EKG 12-lead, tracing only     Status: None (Preliminary result)   Result Value Ref Range    Systolic Blood Pressure  mmHg    Diastolic Blood Pressure  mmHg    Ventricular Rate 74 BPM    Atrial Rate 60 BPM    NJ Interval 214 ms    QRS Duration 84 ms     ms    QTc 432 ms    P Axis 94 degrees    R AXIS 10 degrees    T Axis 30 degrees    Interpretation ECG       Atrial-paced rhythm with prolonged AV conduction with frequent Premature ventricular complexes  Anterior infarct , age undetermined  Abnormal ECG     Adult Type and Screen     Status: None   Result Value Ref Range    ABO/RH(D) O NEG     Antibody Screen Negative Negative    SPECIMEN EXPIRATION DATE [id number]     Prepare red blood cells (unit)     Status: None   Result Value Ref Range    Blood Component Type Red Blood Cells     Product Code O2669L81     Unit Status Transfused     Unit Number [id number]     CROSSMATCH Compatible     CODING SYSTEM [id number]     ISSUE DATE AND TIME [id number]     UNIT ABO/RH O-     UNIT TYPE ISBT 9500    Prepare red blood cells (unit)     Status: None (Preliminary result)   Result Value Ref Range    Blood Component Type Red Blood Cells     Product Code M3483S01     Unit Status Issued     Unit Number [id number]     CROSSMATCH Compatible     CODING SYSTEM [id number]     ISSUE DATE AND TIME [id number]     UNIT ABO/RH O-     UNIT TYPE ISBT 9500    ABO/Rh type and screen *Canceled*     Status: None ()    Narrative    The following orders were created for panel order ABO/Rh type and screen.  Procedure                               Abnormality         Status                     ---------                               -----------         ------                       Please view results for these tests on the individual orders.   CBC with platelets differential     Status: Abnormal    Narrative    The following orders were created for panel order CBC with platelets differential.  Procedure                               Abnormality         Status                     ---------                               -----------         ------                     CBC with platelets and d...[[id number]]  Abnormal            Final result               RBC and Platelet Morphology[[id number]]  Abnormal            Final result                 Please view results for these tests on the individual orders.   ABO/Rh type and screen *Canceled*     Status: None ()    Narrative    The following orders were created for panel order ABO/Rh type and screen.  Procedure                               Abnormality         Status                     ---------                               -----------         ------                      Adult Type and Screen[[id number]]                                                         Please view results for these tests on the individual orders.   ABO/Rh type and screen     Status: None    Narrative    The following orders were created for panel order ABO/Rh type and screen.  Procedure                               Abnormality         Status                     ---------                               -----------         ------                     Adult Type and Screen[[id number]]                            Final result                 Please view results for these tests on the individual orders.     Medications   lidocaine 1 % 0.1-1 mL (has no administration in time range)   lidocaine (LMX4) cream (has no administration in time range)   sodium chloride (PF) 0.9% PF flush 3 mL (3 mLs Intracatheter Not Given 6/20/24 0614)   sodium chloride (PF) 0.9% PF flush 3 mL (has no administration in time range)   allopurinol (ZYLOPRIM) tablet 100 mg (has no administration in time range)   amLODIPine (NORVASC) tablet 10 mg (has no administration in time range)   clopidogrel (PLAVIX) tablet 75 mg ( Oral Unheld by provider 6/19/24 1903)   fluticasone-vilanterol (BREO ELLIPTA) 100-25 MCG/ACT inhaler 1 puff (1 puff Inhalation Not Given 6/19/24 2318)   furosemide (LASIX) tablet 40 mg ( Oral Automatically Held 6/25/24 0800)   isosorbide mononitrate (ISMO/MONOKET) tablet 60 mg (60 mg Oral $Given 6/19/24 2104)   methocarbamol (ROBAXIN) tablet 500 mg (has no administration in time range)   metoprolol tartrate (LOPRESSOR) tablet 25 mg (25 mg Oral $Given 6/19/24 2104)   rosuvastatin (CRESTOR) tablet 20 mg (has no administration in time range)   timolol maleate (TIMOPTIC) 0.5 % ophthalmic solution 1 drop (has no administration in time range)   lidocaine 1 % 0.1-1 mL (has no administration in time range)   lidocaine (LMX4) cream (has no administration in time range)   sodium chloride (PF) 0.9% PF flush 3 mL (3 mLs Intracatheter  Not Given 6/19/24 2358)   sodium chloride (PF) 0.9% PF flush 3 mL (has no administration in time range)   senna-docusate (SENOKOT-S/PERICOLACE) 8.6-50 MG per tablet 1 tablet (has no administration in time range)     Or   senna-docusate (SENOKOT-S/PERICOLACE) 8.6-50 MG per tablet 2 tablet (has no administration in time range)   calcium carbonate (TUMS) chewable tablet 1,000 mg (has no administration in time range)   acetaminophen (TYLENOL) tablet 650 mg (has no administration in time range)     Or   acetaminophen (TYLENOL) Suppository 650 mg (has no administration in time range)   melatonin tablet 1 mg (has no administration in time range)   polyethylene glycol (MIRALAX) Packet 17 g (has no administration in time range)   ondansetron (ZOFRAN ODT) ODT tab 4 mg (has no administration in time range)     Or   ondansetron (ZOFRAN) injection 4 mg (has no administration in time range)   prochlorperazine (COMPAZINE) injection 5 mg (has no administration in time range)     Or   prochlorperazine (COMPAZINE) tablet 5 mg (has no administration in time range)     Or   prochlorperazine (COMPAZINE) suppository 12.5 mg (has no administration in time range)   benzocaine-menthol (CHLORASEPTIC MAX) 15-10 MG lozenge 1 lozenge (has no administration in time range)   artificial saliva (BIOTENE MT) solution 1 spray (1 spray Mouth/Throat Not Given 6/19/24 2317)   lactated ringers infusion ( Intravenous $New Bag 6/20/24 0002)   pantoprazole (PROTONIX) IV push injection 40 mg (40 mg Intravenous $Given 6/19/24 2104)   budesonide (PULMICORT) neb solution 0.5 mg (has no administration in time range)     Labs Ordered and Resulted from Time of ED Arrival to Time of ED Departure   PARTIAL THROMBOPLASTIN TIME - Abnormal       Result Value    aPTT 39 (*)    INR - Abnormal    INR 1.59 (*)    COMPREHENSIVE METABOLIC PANEL - Abnormal    Sodium 143      Potassium 4.3      Carbon Dioxide (CO2) 17 (*)     Anion Gap 13      Urea Nitrogen 47.1 (*)      Creatinine 2.06 (*)     GFR Estimate 23 (*)     Calcium 8.8      Chloride 113 (*)     Glucose 84      Alkaline Phosphatase 58      AST 31      ALT 19      Protein Total 6.4      Albumin 4.0      Bilirubin Total 0.4     ROUTINE UA WITH MICROSCOPIC REFLEX TO CULTURE - Abnormal    Color Urine Straw      Appearance Urine Clear      Glucose Urine Negative      Bilirubin Urine Negative      Ketones Urine Negative      Specific Gravity Urine 1.006      Blood Urine Negative      pH Urine 5.0      Protein Albumin Urine Negative      Urobilinogen Urine Normal      Nitrite Urine Negative      Leukocyte Esterase Urine Negative      Bacteria Urine Few (*)     Mucus Urine Present (*)     RBC Urine 0      WBC Urine 0      Squamous Epithelials Urine <1      Hyaline Casts Urine 5 (*)    CBC WITH PLATELETS AND DIFFERENTIAL - Abnormal    WBC Count 5.7      RBC Count 2.20 (*)     Hemoglobin 6.6 (*)     Hematocrit 22.6 (*)      (*)     MCH 30.0      MCHC 29.2 (*)     RDW 20.3 (*)     Platelet Count 92 (*)     % Neutrophils 49      % Lymphocytes 27      % Monocytes 22      % Eosinophils 1      % Basophils 1      % Immature Granulocytes 0      NRBCs per 100 WBC 1 (*)     Absolute Neutrophils 2.8      Absolute Lymphocytes 1.5      Absolute Monocytes 1.2      Absolute Eosinophils 0.1      Absolute Basophils 0.0      Absolute Immature Granulocytes 0.0      Absolute NRBCs 0.0     RBC AND PLATELET MORPHOLOGY - Abnormal    Platelet Assessment        Value: Automated Count Confirmed. Platelet morphology is normal.    Acanthocytes        Mariah Rods        Basophilic Stippling        Bite Cells        Blister Cells        [place] Cells        Elliptocytes        Hgb C Crystals        Weber-Jolly Bodies        Hypersegmented Neutrophils        Polychromasia Slight (*)     RBC agglutination        RBC Fragments        Reactive Lymphocytes        Rouleaux        Sickle Cells        Smudge Cells        Spherocytes        Stomatocytes        Target  Cells        Teardrop Cells        Toxic Neutrophils        RBC Morphology Confirmed RBC Indices     HEMOGLOBIN - Abnormal    Hemoglobin 8.2 (*)    HEMOGLOBIN - Abnormal    Hemoglobin 8.9 (*)    CBC WITH PLATELETS - Abnormal    WBC Count 5.6      RBC Count 2.96 (*)     Hemoglobin 9.2 (*)     Hematocrit 28.0 (*)     MCV 95      MCH 31.1      MCHC 32.9      RDW 19.5 (*)     Platelet Count 76 (*)    BASIC METABOLIC PANEL   TYPE AND SCREEN, ADULT    ABO/RH(D) O NEG      Antibody Screen Negative      SPECIMEN EXPIRATION DATE [id number]     PREPARE RED BLOOD CELLS (UNIT)    Blood Component Type Red Blood Cells      Product Code Z0546B40      Unit Status Transfused      Unit Number [id number]      CROSSMATCH Compatible      CODING SYSTEM [id number]      ISSUE DATE AND TIME [id number]      UNIT ABO/RH O-      UNIT TYPE ISBT 9500     PREPARE RED BLOOD CELLS (UNIT)    Blood Component Type Red Blood Cells      Product Code U9464I58      Unit Status Issued      Unit Number [id number]      CROSSMATCH Compatible      CODING SYSTEM UFRF523      ISSUE DATE AND TIME [id number]      UNIT ABO/RH O-      UNIT TYPE ISBT 9500     PREPARE RED BLOOD CELLS (UNIT)   TRANSFUSE RED BLOOD CELLS (UNIT)   TRANSFUSE RED BLOOD CELLS (UNIT)   ABO/RH TYPE AND SCREEN     No orders to display            Critical care was not performed.     Medical Decision Making  The patient's presentation was of high complexity (an acute health issue posing potential threat to life or bodily function).    The patient's evaluation involved:  review of external note(s) from 3+ sources (see separate area of note for details)  review of 3+ test result(s) ordered prior to this encounter (see separate area of note for details)  ordering and/or review of 3+ test(s) in this encounter (see separate area of note for details)  discussion of management or test interpretation with another health professional (see separate area of note for details)    The patient's  management necessitated high risk (a decision regarding hospitalization).    Assessment & Plan   89-year-old female with history of paroxysmal A-fib on Eliquis and Plavix transferred to ER with anemia.  Patient had this before in the past unclear etiology had an upper endoscopy but no colonoscopy.  No signs of any GI bleeding etc.  Patient though hemoglobin 6.6 which is down vitally stable otherwise appears pale though no other major findings of bleeding patient be confirmed hemoglobin 6.6 discussed with medicine also and also cardiology.  Will be admitted to medicine service for further workup of acute blood loss potentially.  Patient consented transfused 2 units of packed cells in the ER initiation.  Will be admitted to medicine for ongoing management cardiology note noted holding Eliquis while hemoglobin less than 8.  Continue Plavix.  Patient otherwise been stable.  As noted medicine agrees with plan.  At this point is his chronic disease anemia versus some other type hemolysis versus GI bleeding etc.  Or other blood loss.       I have reviewed the nursing notes. I have reviewed the findings, diagnosis, plan and need for follow up with the patient.    New Prescriptions    No medications on file       Final diagnoses:   Anemia, unspecified type   Long term (current) use of anticoagulants   Antiplatelet or antithrombotic long-term use   Stage 3a chronic kidney disease (H)   Paroxysmal atrial fibrillation (H)       I, Sloane Gonzalez, am serving as a trained medical scribe to document services personally performed by Dima San MD based on the provider's statements to me on June 19, 2024.  This document has been checked and approved by the attending provider.    Dima DENNEY MD, was physically present and have reviewed and verified the accuracy of this note documented by Sloane Gonzalez, medical scribe.      Dima San MD     [facility] EMERGENCY DEPARTMENT  6/19/2024    This note  was created at least in part by the use of dragon voice dictation system. Inadvertent typographical errors may still exist.  Dima San MD.  Patient evaluated in the emergency department during the COVID-19 pandemic period. Careful attention to patients safety was addressed throughout the evaluation. Evaluation and treatment management was initiated with disposition made efficiently and appropriate as possible to minimize any risk of potential exposure to patient during this evaluation.          Dima San MD  06/20/24 0792

## 2024-06-19 NOTE — TELEPHONE ENCOUNTER
I received a phone call from the lab regarding HGB 6.5 today.   It is a sudden drop from 8.0 on 6/12/24.  Pt was advised to go to ED for possible internal bleeding.

## 2024-06-19 NOTE — ED TRIAGE NOTES
Pt ambulatory to triage with c/o abnormal labs. Pt states her Hgb was 6 this AM, here for blood transfusion. Pt asymptomatic at this time, VSS on RA.      Triage Assessment (Adult)       Row Name 06/19/24 3878          Triage Assessment    Airway WDL WDL        Respiratory WDL    Respiratory WDL X;rhythm/pattern     Rhythm/Pattern, Respiratory shortness of breath        Skin Circulation/Temperature WDL    Skin Circulation/Temperature WDL WDL        Cardiac WDL    Cardiac WDL WDL        Peripheral/Neurovascular WDL    Peripheral Neurovascular WDL WDL        Cognitive/Neuro/Behavioral WDL    Cognitive/Neuro/Behavioral WDL WDL

## 2024-06-19 NOTE — H&P
[facility]    History and Physical - Hospitalist Service, GOLD TEAM        Date of Admission:  6/19/2024    Assessment & Plan      Tessajasen Mansfield is a 89 year old female w/ PMH notable for CAD s/p multiple PCIs (on Plavix), sick sinus syndrome s/p pacemaker, paroxysmal atrial fibrillation (on Eliquis), CKD, HTN  admitted on 6/19/2024 for with acute hgb drop to 6.5, uncertain bleeding source though suspect GI bleed.       #Acute blood loss anemia  #Hx of SBO s/p exploratory laparotomy   Presented w/ hgb of 6.5 but without any symptoms of anemia and no clear source of bleed (no melena, hematemesis, hematuria, vaginal bleeding, other apparent bleeding source). Has a recent history of hgb drops, likely related to being on both Plavix and Eliquis. Admitted from 4/13- 4/22/24 for SBO s/p exploratory laparotomy x3 and subsequently readmitted from 5/1- 5/7/24 for melena. Hgb at this time was 8.1, s/p 1u RBC, s/p EGD on 5/1 w/ mild ozzing of the gastric mucosa (not thought to be source of melena). 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 Discharged home on PPI and w/ hgb of 8.5.   Vital signs are stable and patient is completely asymptomatic. There is not a clear source of bleeding at this time. Does not appear patient has had a lower endoscopy and last EGD was 5/1 as above.   - STOP Eliquis altogether  - CONTINUE Plavix  - IVF @ 100ml/hr overnight  - 2U pRBC in ED, repeat hgb 2200  - Transfuse hgb <7 or active bleeding  - IV pantoprazole BID  - NPO  - Maintain 2 large bore IVs  - Type & screen Q72H  - GI consulted and following, appreciate recs    #CAD s/p multiple PCIs, most recently 11/2023  #Paroxysmal atrial fibrillation  #Sick sinus syndrome s/p dual chamber pacemaker   #HTN  #HLD  #HFpEF (# Chronic heart failure with preserved ejection fraction: heart failure noted on problem list and last echo with EF >50% )  Has 3 LAD  stents, 1 L circumflex stent, 2 RCA stents, 1 R posterior descending artery stent. Most recent stent was placed in Mid LAD in November 2023. On Plavis and Eliquis PTA. Admission hgb of 6.5 appears to be the lowest hgb to date. There is documentation of discussion w/ pt's cardiologist regarding her anti-platelet and DOAC use. It was decided that if her hgb drops <8, the recommendation would be to stop Eliquis and continue Plavix. Care coordination in the ED did speak w/ the patient's cardiologist to confirm this plan on 6/19/24 (see note).  Follows w/ Dr. Santoyo and Randa Ann CNP for Cardiology. Last echo in Oct 2023-- EF 55-60%. Also w/ mitral and tricuspid valve insufficiency. On Lasix for BL LE swelling.   - STOP Eliquis  - CONTINUE Plavix  - PTA rosuvastatin 20mg daily  - HOLD PTA lasix for now  - Continue PTA amlodipine 10mg daily, isosorbide mononitrate 60mg BID, metoprolol 25mg BID  - Cardiology consult, appreciate input on DOAC and anti-platelet    #CKD: Baseline Cr variable-- recently ~1.3-1.5 with recent increase to 1.8-2. On admission, Cr 2.06. IVF as above. Follow Cr w/ daily BMP.  #Cough variant asthma: Continue PTA pulmocort neb BID, Advair (subbed here w/ Samantha Gonzalez)  #Glaucoma: Continue PTA timolol eye drops  #Gout: Continue PTA allopurinol           Diet: NPO per Anesthesia Guidelines for Procedure/Surgery Except for: Meds  DVT Prophylaxis: Pneumatic Compression Devices  Bonner Catheter: Not present  Lines: None     Cardiac Monitoring: None  Code Status: Full Code    Clinically Significant Risk Factors Present on Admission               # Drug Induced Coagulation Defect: home medication list includes an anticoagulant medication  # Drug Induced Platelet Defect: home medication list includes an antiplatelet medication   # Hypertension: Noted on problem list  # Chronic heart failure with preserved ejection fraction: heart failure noted on problem list and last echo with EF >50%   # Anemia:  based on hgb <11               # Financial/Environmental Concerns:     # Pacemaker present       Disposition Plan     Medically Ready for Discharge: Anticipated in 2-4 Days         The patient's care was discussed with the Attending Physician, Dr. Rothman, Bedside Nurse, and Patient.    Roni Chan PA-C  Hospitalist Service, [facility]  Securely message with [email or web address] (more info)  Text page via [facility] Paging/Directory   See signed in provider for up to date coverage information    ______________________________________________________________________    Chief Complaint   Low hgb on routine labs    History is obtained from the patient and electronic health record    History of Present Illness   Tessa Mansfield is a 89 year old female who has PMH notable for CAD s/p multiple PCIs (on Plavix), sick sinus syndrome s/p pacemaker, paroxysmal atrial fibrillation (on Eliquis), CKD, HTN. She presented to the ED on 6/19 after routine labs revealed hgb  6.5 but without any symptoms of anemia and no clear source of bleed (no melena, hematemesis, hematuria, vaginal bleeding, other apparent bleeding source). Has a recent history of hgb drops, likely related to being on both Plavix and Eliquis. Admitted from 4/13- 4/22/24 for SBO s/p exploratory laparotomy x3 and subsequently readmitted from 5/1- 5/7/24 for melena. Hgb at this time was 8.1, s/p 1u RBC, s/p EGD on 5/1 w/ mild ozzing of the gastric mucosa (not thought to be source of melena). 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 Discharged home on PPI and w/ hgb of 8.5.     Feels somewhat short of breath with walking but this has been around for about 6 months. No dizziness, no chest pain, no palpitations.       Past Medical History    Past Medical History:   Diagnosis Date    CAD (coronary artery disease)     CAD s/p PCI+BMS to MRCA 10/2002, PCI to mLCX 2/2003, PCI+DESx2  to pLAD 11/2007, PCI+DESx1 to dRCA 12/2007, PCI+ALVAREZ to RPDA 7/2013; on indefinite DAPT  10/27/2002    10/27/2002: AMI s/p PCI+BMS (3.5x18mm Bx velocity) to mRCA 2/5/2003: Back pain; PCI+stent to mLCX c/b distal embolization/slow flow 4/11/2003: Unstable angina; PCI+stent (Hepacoat velocity) to mLAD 11/27/2007: PCI+DESx2 to pLAD (IVUS w/ calcification of LMCA and ulcerated plaque pLAD, 80% dRCA; also had 80% dLCX, too small to stent) 12/11/2007: Staged PCI. PCI+DESx1 (3.5x13mm Cypher) to dRCA (indefinite DAPT recommended at this time) 6/27/2008: Angina. mLCX stent 70% ISR. LAD and RCA stents patent. Myocardium at risk from LCX felt to be small, medical management preferred to PCI. 11/10/2009: Angina. Findings unchanged from 6/27/2008, FFR LAD 0.90. Medical management recommended. 7/23/2013: Unstable angina; PCI+ALVAREZ to mPDA. Diagnostic findings: LMCA 40% distal. LAD: pLAD stents patent mLAD 30% ISR dLAD 50% diffuse, D2 diffuse disease. LCX 80% mid ISR. RCA diffuse <30% mid, RPLAs diffuse disease, RPDA 100% occlusion.      Cardiac Pacemaker- [employer], dual chamber- NOT dependant 11/28/2007    CKD (chronic kidney disease), stage IV (H)     Hyperlipidemia LDL goal <70     Hypertension     Stented coronary artery 10-    RCA    Stented coronary artery 2-5-2003    LCx    Stented coronary artery 4-    LAD    Stented coronary artery 11-    LAD    Stented coronary artery 12-    RCA    Transient complete heart block (H) 11/28/2007       Past Surgical History   Past Surgical History:   Procedure Laterality Date    CHOLECYSTECTOMY      CV CORONARY ANGIOGRAM N/A 6/17/2022    Procedure: Coronary Angiogram;  Surgeon: Constantine Macias MD;  Location: [facility] CARDIAC CATH LAB    CV CORONARY ANGIOGRAM N/A 7/17/2023    Procedure: Coronary Angiogram;  Surgeon: Constantine Macias MD;  Location:  HEART CARDIAC CATH LAB    CV PCI N/A 6/17/2022    Procedure: Percutaneous Coronary Intervention;   Surgeon: Constantine Macias MD;  Location:  HEART CARDIAC CATH LAB    CV PCI N/A 7/17/2023    Procedure: Percutaneous Coronary Intervention;  Surgeon: Constantine Macias MD;  Location:  HEART CARDIAC CATH LAB    CV PCI N/A 11/6/2023    Procedure: Percutaneous Coronary Intervention;  Surgeon: Constantine Macias MD;  Location:  HEART CARDIAC CATH LAB    CV RIGHT HEART CATH MEASUREMENTS RECORDED N/A 6/17/2022    Procedure: Right Heart Catheterization;  Surgeon: Constantine Macias MD;  Location:  HEART CARDIAC CATH LAB    EP PACEMAKER N/A 10/15/2019    Procedure: EP PACEMAKER;  Surgeon: Anthony Zhu MD;  Location:  HEART CARDIAC CATH LAB    ESOPHAGOSCOPY, GASTROSCOPY, DUODENOSCOPY (EGD), COMBINED N/A 7/20/2023    Procedure: Esophagoscopy, gastroscopy, duodenoscopy (EGD), combined;  Surgeon: Bekah Dash DO;  [facility]    ESOPHAGOSCOPY, GASTROSCOPY, DUODENOSCOPY (EGD), COMBINED N/A 5/2/2024    Procedure: Esophagoscopy, gastroscopy, duodenoscopy (EGD), combined;  Surgeon: Tavo Donaldson MD;  [facility]    HC PPM INSERTION NEW/REPLACEMENT W/ ATRIAL&VENTRICULAR LEAD  11-    HYSTERECTOMY      LAPAROTOMY EXPLORATORY N/A 4/13/2024    Procedure: Laparotomy exploratory, Wound Vac Placement.;  Surgeon: Anthony Trammell MD;  [facility]    LAPAROTOMY EXPLORATORY N/A 4/14/2024    Procedure: Abdominal Re-Exploration and Closure;  Surgeon: Anthony Trammell MD;  [facility]    LAPAROTOMY EXPLORATORY N/A 4/13/2024    Procedure: Exploratory Laparotomy;  Surgeon: Anthony Trammell MD;  [facility]       Prior to Admission Medications   Prior to Admission Medications   Prescriptions Last Dose Informant Patient Reported? Taking?   Multiple Vitamins-Minerals (PRESERVISION AREDS 2+MULTI VIT PO)  Self Yes No   Sig: Take 1 capsule by mouth 2 times daily   acetaminophen (TYLENOL) 325 MG tablet  Self Yes No   Sig: Take 975 mg by mouth nightly as needed  for pain   allopurinol (ZYLOPRIM) 100 MG tablet   No No   Sig: Take 1 tablet (100 mg) by mouth daily   amLODIPine (NORVASC) 10 MG tablet  Self No No   Sig: Take 1 tablet (10 mg) by mouth daily   apixaban ANTICOAGULANT (ELIQUIS) 2.5 MG tablet  Self No No   Sig: Take 1 tablet (2.5 mg) by mouth 2 times daily   budesonide (PULMICORT) 0.5 MG/2ML neb solution  Self No No   Sig: Take 2 mLs (0.5 mg) by nebulization 2 times daily   clopidogrel (PLAVIX) 75 MG tablet  Self No No   Sig: Take 1 tablet (75 mg) by mouth daily   ferrous sulfate (FEROSUL) 325 (65 Fe) MG tablet  Self Yes No   Sig: Take 1 tablet by mouth 4 times weekly on Sunday, Tuesday, Thursday, and Saturday.   fluticasone-salmeterol (ADVAIR) 250-50 MCG/ACT inhaler  Self No No   Sig: INHALE 1 DOSE BY MOUTH TWICE DAILY   furosemide (LASIX) 40 MG tablet   No No   Sig: Take 1 tablet (40 mg) by mouth daily May take an additional 20 mg at noon as needed for swelling.   isosorbide mononitrate (ISMO/MONOKET) 20 MG tablet   No No   Sig: Take 3 tablets (60 mg) by mouth 2 times daily   methocarbamol (ROBAXIN) 500 MG tablet   No No   Sig: Take 1 tablet (500 mg) by mouth every 6 hours as needed for muscle spasms   metoprolol tartrate (LOPRESSOR) 25 MG tablet   No No   Sig: Take 1 tablet (25 mg) by mouth 2 times daily   omeprazole (PRILOSEC) 40 MG DR capsule  Self No No   Sig: Take 1 capsule (40 mg) by mouth daily   potassium chloride ER (K-TAB) 20 MEQ CR tablet   No No   Sig: Take 1 tablet (20 mEq) by mouth daily   psyllium (METAMUCIL) 28.3 % packet  Self Yes No   Sig: Take 1 packet by mouth daily as needed for constipation   rosuvastatin (CRESTOR) 20 MG tablet  Self No No   Sig: Take 1 tablet (20 mg) by mouth daily for 360 days   timolol maleate (TIMOPTIC) 0.5 % ophthalmic solution  Self Yes No   Sig: INSTILL 1 DROP INTO EACH EYE ONCE DAILY IN THE MORNING   vitamin B-12 (CYANOCOBALAMIN) 500 MCG tablet  Self Yes No   Sig: Take 1 tablet by mouth daily   vitamin D3 25 mcg (1000  "units) tablet  Self No No   Sig: Take 1 tablet (25 mcg) by mouth every other day      Facility-Administered Medications: None        Review of Systems    The 10 point Review of Systems is negative other than noted in the HPI or here.     Allergies   Allergies   Allergen Reactions    Codeine Sulfate Itching    Shrimp Swelling    Bactrim [Sulfamethoxazole W/Trimethoprim]      Patient unable to recall    Biaxin [Clarithromycin]     Chlorthalidone Nausea and Vomiting    Clonidine     Darvon [Propoxyphene Hcl]     Dilaudid [Hydromorphone] Visual Disturbance and Hallucination     Tolerated in April 2024 hospital admissions    Gabapentin Fatigue and Confusion     \"felt drunk\"    Indomethacin     Levaquin [Levofloxacin Hemihydrate]     Morphine Sulfate     Percocet [Oxycodone-Acetaminophen] Hallucination    Pregabalin Itching and Fatigue    Simvastatin Palpitations     Muscle weakness, leg cramping      Spironolactone      Dehydrated      Terazosin      ------------------------------------------------------------------------     Physical Exam   Vital Signs: Temp: 97.8  F (36.6  C) Temp src: Oral BP: 120/75 Pulse: 81   Resp: 18 SpO2: 99 % O2 Device: None (Room air)    Weight: 0 lbs 0 oz    General Appearance: Appears comfortable in bed, NAD.   Respiratory: Lungs CTAB, breathing comfortably on room air.   Cardiovascular: RRR, no murmur appreciated. Quiet heart sounds.   GI: Abdomen is soft and non distended.  Skin: Warm and dry.   Other: Pleasant and interactive. Alert and oriented.      Medical Decision Making       75 MINUTES SPENT BY ME on the date of service doing chart review, history, exam, documentation & further activities per the note.      Data   ------------------------- PAST 24 HR DATA REVIEWED -----------------------------------------------    I have personally reviewed the following data over the past 24 hrs:    5.7  \   6.6 (LL)   / 92 (L)     143 113 (H) 47.1 (H) /  84   4.3 17 (L) 2.06 (H) \     ALT: 19 AST: " 31 AP: 58 TBILI: 0.4   ALB: 4.0 TOT PROTEIN: 6.4 LIPASE: N/A     INR:  1.59 (H) PTT:  39 (H)   D-dimer:  N/A Fibrinogen:  N/A       Imaging results reviewed over the past 24 hrs:   No results found for this or any previous visit (from the past 24 hour(s)).

## 2024-06-20 ENCOUNTER — PATIENT OUTREACH (OUTPATIENT)
Dept: CARE COORDINATION | Facility: CLINIC | Age: 89
End: 2024-06-20

## 2024-06-20 VITALS
DIASTOLIC BLOOD PRESSURE: 61 MMHG | HEART RATE: 55 BPM | RESPIRATION RATE: 18 BRPM | OXYGEN SATURATION: 100 % | SYSTOLIC BLOOD PRESSURE: 134 MMHG | TEMPERATURE: 98.2 F

## 2024-06-20 LAB
ANION GAP SERPL CALCULATED.3IONS-SCNC: 13 MMOL/L (ref 7–15)
ATRIAL RATE - MUSE: 60 BPM
BUN SERPL-MCNC: 37.7 MG/DL (ref 8–23)
CALCIUM SERPL-MCNC: 8.9 MG/DL (ref 8.8–10.2)
CHLORIDE SERPL-SCNC: 113 MMOL/L (ref 98–107)
CREAT SERPL-MCNC: 1.84 MG/DL (ref 0.51–0.95)
DEPRECATED HCO3 PLAS-SCNC: 17 MMOL/L (ref 22–29)
DIASTOLIC BLOOD PRESSURE - MUSE: NORMAL MMHG
EGFRCR SERPLBLD CKD-EPI 2021: 26 ML/MIN/1.73M2
ERYTHROCYTE [DISTWIDTH] IN BLOOD BY AUTOMATED COUNT: 19.5 % (ref 10–15)
ERYTHROCYTE [DISTWIDTH] IN BLOOD BY AUTOMATED COUNT: 20.2 % (ref 10–15)
FERRITIN SERPL-MCNC: 79 NG/ML (ref 11–328)
GLUCOSE SERPL-MCNC: 82 MG/DL (ref 70–99)
HCT VFR BLD AUTO: 28 % (ref 35–47)
HCT VFR BLD AUTO: 28.7 % (ref 35–47)
HGB BLD-MCNC: 8.9 G/DL (ref 11.7–15.7)
HGB BLD-MCNC: 8.9 G/DL (ref 11.7–15.7)
HGB BLD-MCNC: 9.2 G/DL (ref 11.7–15.7)
INTERPRETATION ECG - MUSE: NORMAL
IRON BINDING CAPACITY (ROCHE): 269 UG/DL (ref 240–430)
IRON SATN MFR SERPL: 24 % (ref 15–46)
IRON SERPL-MCNC: 64 UG/DL (ref 37–145)
MCH RBC QN AUTO: 31.1 PG (ref 26.5–33)
MCH RBC QN AUTO: 31.1 PG (ref 26.5–33)
MCHC RBC AUTO-ENTMCNC: 32 G/DL (ref 31.5–36.5)
MCHC RBC AUTO-ENTMCNC: 32.9 G/DL (ref 31.5–36.5)
MCV RBC AUTO: 95 FL (ref 78–100)
MCV RBC AUTO: 99 FL (ref 78–100)
P AXIS - MUSE: 94 DEGREES
PLATELET # BLD AUTO: 73 10E3/UL (ref 150–450)
PLATELET # BLD AUTO: 76 10E3/UL (ref 150–450)
POTASSIUM SERPL-SCNC: 3.5 MMOL/L (ref 3.4–5.3)
PR INTERVAL - MUSE: 214 MS
QRS DURATION - MUSE: 84 MS
QT - MUSE: 390 MS
QTC - MUSE: 432 MS
R AXIS - MUSE: 10 DEGREES
RBC # BLD AUTO: 2.89 10E6/UL (ref 3.8–5.2)
RBC # BLD AUTO: 2.96 10E6/UL (ref 3.8–5.2)
RETICS # AUTO: 0.17 10E6/UL (ref 0.03–0.1)
RETICS/RBC NFR AUTO: 5.7 % (ref 0.5–2)
SODIUM SERPL-SCNC: 143 MMOL/L (ref 135–145)
SYSTOLIC BLOOD PRESSURE - MUSE: NORMAL MMHG
T AXIS - MUSE: 30 DEGREES
VENTRICULAR RATE- MUSE: 74 BPM
WBC # BLD AUTO: 5.6 10E3/UL (ref 4–11)
WBC # BLD AUTO: 6.7 10E3/UL (ref 4–11)

## 2024-06-20 PROCEDURE — 99204 OFFICE O/P NEW MOD 45 MIN: CPT | Mod: GC | Performed by: INTERNAL MEDICINE

## 2024-06-20 PROCEDURE — 82728 ASSAY OF FERRITIN: CPT | Performed by: STUDENT IN AN ORGANIZED HEALTH CARE EDUCATION/TRAINING PROGRAM

## 2024-06-20 PROCEDURE — 80048 BASIC METABOLIC PNL TOTAL CA: CPT

## 2024-06-20 PROCEDURE — 96361 HYDRATE IV INFUSION ADD-ON: CPT

## 2024-06-20 PROCEDURE — 85045 AUTOMATED RETICULOCYTE COUNT: CPT

## 2024-06-20 PROCEDURE — 258N000003 HC RX IP 258 OP 636: Mod: JZ

## 2024-06-20 PROCEDURE — 99239 HOSP IP/OBS DSCHRG MGMT >30: CPT | Performed by: STUDENT IN AN ORGANIZED HEALTH CARE EDUCATION/TRAINING PROGRAM

## 2024-06-20 PROCEDURE — 94640 AIRWAY INHALATION TREATMENT: CPT

## 2024-06-20 PROCEDURE — 85027 COMPLETE CBC AUTOMATED: CPT | Mod: 91

## 2024-06-20 PROCEDURE — 250N000013 HC RX MED GY IP 250 OP 250 PS 637

## 2024-06-20 PROCEDURE — 96376 TX/PRO/DX INJ SAME DRUG ADON: CPT

## 2024-06-20 PROCEDURE — 36415 COLL VENOUS BLD VENIPUNCTURE: CPT

## 2024-06-20 PROCEDURE — 85018 HEMOGLOBIN: CPT

## 2024-06-20 PROCEDURE — G0378 HOSPITAL OBSERVATION PER HR: HCPCS

## 2024-06-20 PROCEDURE — 250N000009 HC RX 250: Performed by: STUDENT IN AN ORGANIZED HEALTH CARE EDUCATION/TRAINING PROGRAM

## 2024-06-20 PROCEDURE — 99222 1ST HOSP IP/OBS MODERATE 55: CPT | Mod: 24

## 2024-06-20 PROCEDURE — 82668 ASSAY OF ERYTHROPOIETIN: CPT | Performed by: INTERNAL MEDICINE

## 2024-06-20 PROCEDURE — 999N000157 HC STATISTIC RCP TIME EA 10 MIN

## 2024-06-20 PROCEDURE — C9113 INJ PANTOPRAZOLE SODIUM, VIA: HCPCS | Mod: JZ

## 2024-06-20 PROCEDURE — 250N000009 HC RX 250

## 2024-06-20 PROCEDURE — 99223 1ST HOSP IP/OBS HIGH 75: CPT | Mod: 24 | Performed by: INTERNAL MEDICINE

## 2024-06-20 PROCEDURE — 83550 IRON BINDING TEST: CPT | Performed by: STUDENT IN AN ORGANIZED HEALTH CARE EDUCATION/TRAINING PROGRAM

## 2024-06-20 PROCEDURE — 250N000011 HC RX IP 250 OP 636: Mod: JZ

## 2024-06-20 RX ORDER — FERROUS SULFATE 325(65) MG
325 TABLET ORAL
Qty: 30 TABLET | Refills: 0 | Status: SHIPPED | OUTPATIENT
Start: 2024-06-20 | End: 2024-07-04

## 2024-06-20 RX ORDER — FERROUS SULFATE 325(65) MG
325 TABLET ORAL
Qty: 30 TABLET | Refills: 0 | Status: SHIPPED | OUTPATIENT
Start: 2024-06-20 | End: 2024-06-20

## 2024-06-20 RX ADMIN — PANTOPRAZOLE SODIUM 40 MG: 40 INJECTION, POWDER, FOR SOLUTION INTRAVENOUS at 08:02

## 2024-06-20 RX ADMIN — TIMOLOL MALEATE 1 DROP: 5 SOLUTION/ DROPS OPHTHALMIC at 09:27

## 2024-06-20 RX ADMIN — METOPROLOL TARTRATE 25 MG: 25 TABLET, FILM COATED ORAL at 08:02

## 2024-06-20 RX ADMIN — ALLOPURINOL 100 MG: 100 TABLET ORAL at 08:02

## 2024-06-20 RX ADMIN — ISOSORBIDE MONONITRATE 60 MG: 20 TABLET ORAL at 08:01

## 2024-06-20 RX ADMIN — SODIUM CHLORIDE, POTASSIUM CHLORIDE, SODIUM LACTATE AND CALCIUM CHLORIDE: 600; 310; 30; 20 INJECTION, SOLUTION INTRAVENOUS at 00:02

## 2024-06-20 RX ADMIN — BUDESONIDE INHALATION 0.5 MG: 0.5 SUSPENSION RESPIRATORY (INHALATION) at 08:14

## 2024-06-20 RX ADMIN — ROSUVASTATIN CALCIUM 20 MG: 20 TABLET, FILM COATED ORAL at 08:01

## 2024-06-20 RX ADMIN — CLOPIDOGREL BISULFATE 75 MG: 75 TABLET ORAL at 08:02

## 2024-06-20 RX ADMIN — AMLODIPINE BESYLATE 10 MG: 10 TABLET ORAL at 08:02

## 2024-06-20 ASSESSMENT — ACTIVITIES OF DAILY LIVING (ADL)
ADLS_ACUITY_SCORE: 45
ADLS_ACUITY_SCORE: 42
ADLS_ACUITY_SCORE: 40
ADLS_ACUITY_SCORE: 45
ADLS_ACUITY_SCORE: 42
ADLS_ACUITY_SCORE: 45
ADLS_ACUITY_SCORE: 42
ADLS_ACUITY_SCORE: 45
ADLS_ACUITY_SCORE: 45
ADLS_ACUITY_SCORE: 42
ADLS_ACUITY_SCORE: 43
ADLS_ACUITY_SCORE: 42
ADLS_ACUITY_SCORE: 43

## 2024-06-20 NOTE — PLAN OF CARE
Goal Outcome Evaluation:    [id number]  BP (!) 144/50   Pulse 90   Temp 97.9  F (36.6  C) (Oral)   Resp 18   SpO2 100%     Pt A&O, VSS, RA  Denies pain and SOB  Up independently with cane  PIV infusing LR @ 75 ml  Voiding spontaneously  No acute change, continue w/poc

## 2024-06-20 NOTE — PROGRESS NOTES
Clinic Care Coordination Contact  Ambulatory Care Coordination to Inpatient Care Management   Hand-In Communication    Date:  June 20, 2024  Name: Tessa Mansfield is enrolled in Ambulatory Care Coordination program and I am the Lead Care Coordinator.  CC Contact Information: Epic InJackBeet + phone  Payor Source: Payor: [facility] / Plan: [facility] MEDICARE / Product Type: HMO /   Current services in place:     Please see the CC Snaphot and Care Management Flowsheets for specific  details of this Tessa Mansfield care plan.   Additional details/specific concerns r/t this admission:    No additional concerns at this time      I will follow this admission in Epic. Please feel free to contact me with questions or for further collaboration in discharge planning.    LUPE CALLE RN on 6/20/2024 at 12:02 PM]

## 2024-06-20 NOTE — MEDICATION SCRIBE - ADMISSION MEDICATION HISTORY
Medication Scribe Admission Medication History    Admission medication history is complete. The information provided in this note is only as accurate as the sources available at the time of the update.    Information Source(s): Patient via in-person    Pertinent Information: Pt reported taking medications on PTA medication list as directed.     Changes made to PTA medication list:  Added: None  Deleted: Psyllium 28.3% packets.  Changed: None    Allergies reviewed with patient and updates made in EHR: yes    Medication History Completed By: Ruby Hong 6/20/2024 6:17 AM    PTA Med List   Medication Sig Last Dose    acetaminophen (TYLENOL) 325 MG tablet Take 975 mg by mouth nightly as needed for pain 6/18/2024    allopurinol (ZYLOPRIM) 100 MG tablet Take 1 tablet (100 mg) by mouth daily 6/19/2024 at am    amLODIPine (NORVASC) 10 MG tablet Take 1 tablet (10 mg) by mouth daily 6/19/2024 at am    apixaban ANTICOAGULANT (ELIQUIS) 2.5 MG tablet Take 1 tablet (2.5 mg) by mouth 2 times daily 6/19/2024 at am    budesonide (PULMICORT) 0.5 MG/2ML neb solution Take 2 mLs (0.5 mg) by nebulization 2 times daily Past Week    clopidogrel (PLAVIX) 75 MG tablet Take 1 tablet (75 mg) by mouth daily 6/19/2024 at am    ferrous sulfate (FEROSUL) 325 (65 Fe) MG tablet Take 1 tablet by mouth 4 times weekly on Sunday, Tuesday, Thursday, and Saturday. 6/18/2024    fluticasone-salmeterol (ADVAIR) 250-50 MCG/ACT inhaler INHALE 1 DOSE BY MOUTH TWICE DAILY  at couple days ago    furosemide (LASIX) 40 MG tablet Take 1 tablet (40 mg) by mouth daily May take an additional 20 mg at noon as needed for swelling. 6/19/2024 at am    isosorbide mononitrate (ISMO/MONOKET) 20 MG tablet Take 3 tablets (60 mg) by mouth 2 times daily 6/19/2024 at am    methocarbamol (ROBAXIN) 500 MG tablet Take 1 tablet (500 mg) by mouth every 6 hours as needed for muscle spasms Past Week    metoprolol tartrate (LOPRESSOR) 25 MG tablet Take 1 tablet (25 mg) by mouth 2  times daily 6/19/2024 at am    Multiple Vitamins-Minerals (PRESERVISION AREDS 2+MULTI VIT PO) Take 1 capsule by mouth 2 times daily 6/19/2024 at am    omeprazole (PRILOSEC) 40 MG DR capsule Take 1 capsule (40 mg) by mouth daily 6/19/2024 at am    potassium chloride ER (K-TAB) 20 MEQ CR tablet Take 1 tablet (20 mEq) by mouth daily 6/19/2024 at am    rosuvastatin (CRESTOR) 20 MG tablet Take 1 tablet (20 mg) by mouth daily for 360 days 6/19/2024 at am    timolol maleate (TIMOPTIC) 0.5 % ophthalmic solution INSTILL 1 DROP INTO EACH EYE ONCE DAILY IN THE MORNING 6/19/2024 at am    vitamin B-12 (CYANOCOBALAMIN) 500 MCG tablet Take 1 tablet by mouth daily 6/19/2024 at am    vitamin D3 25 mcg (1000 units) tablet Take 1 tablet (25 mcg) by mouth every other day 6/19/2024 at am

## 2024-06-20 NOTE — CONSULTS
"  Hematology  Consult Note   Date of Service: 06/20/2024    Patient: Tessa Mansfield  MRN: [id number]  Admission Date: 6/19/2024  Hospital Day # 1       Reason for Consult: \"progressive thrombocytopenia, anemia. previously seen by hematology please re-eval. no overt bleeding or plans for scope per GI\"        Assessment & Plan:   Tessa Mansfield is a 89 year old female with a medical history of CAD (s/p multiple stents with most recent PCI on 11/6/23 on Plavix), SSS (s/p dual-chambered PPM), PAF (on apixaban), MR, TR, CKD4, HTN, chronic anemia, angiodysplasias, and diverticulosis that presented to ED after her outpatient labs revealed a Hgb of 6.5. the patient was not symptomatic in regards to anemia at that time. She does not report obvious signs of bleeding. /1-5/7/24 she had been reporting melena while being on Plavix and Eliquis. During that admission, the GI team performed EGD without an obvious source of bleeding found. It was felt that her bleeding was likely due to angiodysplasias while on her DOAC therapy.     We reviewed smear from this admission. She had large platelets noted on her smear (in the past had LGL noted). 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 Red cells looked dimorphic on smear that is likely due to the transfused blood.    Regarding anemia, she has a component of both iron deficiency anemia along with anemia of kidney disease/inflammation. The iron deficiency is likely from the suspected AVMs as mentioned above. Ideally if patient could de-escalate her anticoagulation to either plavix or eliquis monotherapy or possible ASA + Eliquis then this may reduce her bleeding risk. However, given her significant CAD history along with CHADsVASC 6, it's understandable if this is not possible. We would defer this decision to Cardiology with risk/benefit assessment.     Regarding iron supplementation, would recommend to continue PO iron supplements for now as she received 2u PRBC today " (which has iron in it). If Iron studies not improved in 1 month, would send to Cardiology or Hematology for IV Iron supplementation as patient may have altered absorption issues.     Recommendations:   - No indication for steroids at this time  - Continue PO iron supplements. Recheck Iron studies in 1 month with PCP, if not improved, would consider hematology referral for IV Iron infusion or Cardiology to utilize IV iron dextran   - EPO ordered   - Recommend discussion with Cardiology to evaluate for de-escalation of anticoagulation if possible       Thank you for this consult and the opportunity to treat this pleasant patient. We will continue to follow this patient. Please do not hesitate to page with any questions or concerns.    Patient was seen and plan of care was discussed with attending physician Dr. Aponte. Attestation to follow.     Mansoor Lin DO   Hematology/Oncology/BMT Fellow PGY4  Pager: [phone]    Attending  The patient was seen and examined by me separate from the resident/fellow provider.The note above reflects my assessment and plan. I have personally reviewed today's labs,vital and radiology results. The points of care that were added by me are:    Pt has  anemia likely related to GI bleed  due to possible AVM on eliquis and plavix. Did receive 2 unit(s) PRBCs May need IV iron in future but now ok with oral ferrous sulfate 4x per week.Cardiology would likt to keep the eliquis for A fib and plavix for stents on board. 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  [phone]      ==============================================================================      History of Present Illness:    History  obtained via chart review and from patient   Tessa Mansfield is a 89 year old female with a medical history of CAD (s/p multiple stents with most recent PCI on 11/6/23 on Plavix), SSS (s/p dual-chambered PPM), PAF (on apixaban), MR, TR, CKD4, HTN, chronic anemia, angiodysplasias, and diverticulosis that presented to ED after her outpatient labs revealed a Hgb of 6.5. the patient was not symptomatic in regards to anemia at that time. She denies melena, hematochezia, hematuria, hemopytsis, or other noted signs of bleeding. She recently had been admitted from 4/13-4/22/24 for SBO that required an ex-lap on 4/13 complicated by hemorrhagic bowel w/o necrosis, multiple small bowel torsions around adhesions (s/p adhesiolysis). She then had required another OR encounter on 4/14/24 for exploration of abdomen with closure. At that time there was felt to be minimal bleeding/minimal clot in the abdomen. She then was discharged to TCU on 4/22/24. 5/1-5/7/24 she had been reporting melena while being on Plavix and Eliquis. During that admission, the GI team performed EGD without an obvious source of bleeding found. It was felt that her bleeding was likely due to angiodysplasias while on her DOAC therapy.     She has been continuing to take her iron pills for the past few months which she states is new for her.            Review of Systems:  A comprehensive ROS was performed and found to be negative or non-contributory with the exception of that noted in the HPI above.    Past Medical History:  Past Medical History:   Diagnosis Date    CAD (coronary artery disease)     CAD s/p PCI+BMS to MRCA 10/2002, PCI to mLCX 2/2003, PCI+DESx2 to pLAD 11/2007, PCI+DESx1 to dRCA 12/2007, PCI+ALVAREZ to RPDA 7/2013; on indefinite DAPT  10/27/2002    10/27/2002: AMI s/p PCI+BMS (3.5x18mm Bx velocity) to mRCA 2/5/2003: Back pain; PCI+stent to mLCX c/b distal embolization/slow flow 4/11/2003: Unstable angina; PCI+stent (Hepacoat velocity) to mLAD  11/27/2007: PCI+DESx2 to pLAD (IVUS w/ calcification of LMCA and ulcerated plaque pLAD, 80% dRCA; also had 80% dLCX, too small to stent) 12/11/2007: Staged PCI. PCI+DESx1 (3.5x13mm Cypher) to dRCA (indefinite DAPT recommended at this time) 6/27/2008: Angina. mLCX stent 70% ISR. LAD and RCA stents patent. Myocardium at risk from LCX felt to be small, medical management preferred to PCI. 11/10/2009: Angina. Findings unchanged from 6/27/2008, FFR LAD 0.90. Medical management recommended. 7/23/2013: Unstable angina; PCI+ALVAREZ to mPDA. Diagnostic findings: LMCA 40% distal. LAD: pLAD stents patent mLAD 30% ISR dLAD 50% diffuse, D2 diffuse disease. LCX 80% mid ISR. RCA diffuse <30% mid, RPLAs diffuse disease, RPDA 100% occlusion.      Cardiac Pacemaker- Medtronic, dual chamber- NOT dependant 11/28/2007    CKD (chronic kidney disease), stage IV (H)     Hyperlipidemia LDL goal <70     Hypertension     Stented coronary artery 10-    RCA    Stented coronary artery 2-5-2003    LCx    Stented coronary artery 4-    LAD    Stented coronary artery 11-    LAD    Stented coronary artery 12-    RCA    Transient complete heart block (H) 11/28/2007       Past Surgical History:  Past Surgical History:   Procedure Laterality Date    CHOLECYSTECTOMY      CV CORONARY ANGIOGRAM N/A 6/17/2022    Procedure: Coronary Angiogram;  Surgeon: Constantine Macias MD;  Location: [facility] CARDIAC CATH LAB    CV CORONARY ANGIOGRAM N/A 7/17/2023    Procedure: Coronary Angiogram;  Surgeon: Constantine Macias MD;  Location: [facility] CARDIAC CATH LAB    CV PCI N/A 6/17/2022    Procedure: Percutaneous Coronary Intervention;  Surgeon: Constantine Macias MD;  Location: [facility] CARDIAC CATH LAB    CV PCI N/A 7/17/2023    Procedure: Percutaneous Coronary Intervention;  Surgeon: Constantine Macias MD;  Location: [facility] CARDIAC CATH LAB    CV PCI N/A 11/6/2023    Procedure: Percutaneous Coronary Intervention;  Surgeon:  Constantine Macias MD;  Location:  [facility] CARDI[facility] CATH LAB    CV RIGHT HEART CATH MEASUREMENTS RECORDED N/A 6/17/2022    Procedure: Right Heart Catheterization;  Surgeon: Constantine Macias MD;  Location:  HEART CARDIAC CATH LAB    EP PACEMAKER N/A 10/15/2019    Procedure: EP PACEMAKER;  Surgeon: Anthony Zhu MD;  Location:  HEART CARDIAC CATH LAB    ESOPHAGOSCOPY, GASTROSCOPY, DUODENOSCOPY (EGD), COMBINED N/A 7/20/2023    Procedure: Esophagoscopy, gastroscopy, duodenoscopy (EGD), combined;  Surgeon: Bekah Dash DO;  Location:  GI    ESOPHAGOSCOPY, GASTROSCOPY, DUODENOSCOPY (EGD), COMBINED N/A 5/2/2024    Procedure: Esophagoscopy, gastroscopy, duodenoscopy (EGD), combined;  Surgeon: Tavo Donaldson MD;  Location:  GI    HC PPM INSERTION NEW/REPLACEMENT W/ ATRIAL&VENTRICULAR LEAD  11-    HYSTERECTOMY      LAPAROTOMY EXPLORATORY N/A 4/13/2024    Procedure: Laparotomy exploratory, Wound Vac Placement.;  Surgeon: Anthony Trammell MD;  Location: [facility] OR    LAPAROTOMY EXPLORATORY N/A 4/14/2024    Procedure: Abdominal Re-Exploration and Closure;  Surgeon: Anthony Trammell MD;  Location: [facility] OR    LAPAROTOMY EXPLORATORY N/A 4/13/2024    Procedure: Exploratory Laparotomy;  Surgeon: Anthony Trammell MD;  Location: [facility] OR       Social History:  Social History     Socioeconomic History    Marital status:     Number of children: 4   Occupational History     Employer: [employer]   Tobacco Use    Smoking status: Former     Current packs/day: 0.30     Average packs/day: 0.3 packs/day for 15.0 years (4.5 ttl pk-yrs)     Types: Cigarettes    Smokeless tobacco: Never    Tobacco comments:     Quit 35+ years ago   Substance and Sexual Activity    Drug use: No    Sexual activity: Not Currently     Partners: Male     Birth control/protection: Post-menopausal   Other Topics Concern    Blood Transfusions Yes    Exercise No     Social Determinants of Health      Financial Resource Strain: Low Risk  (11/15/2023)    Financial Resource Strain     Within the past 12 months, have you or your family members you live with been unable to get utilities (heat, electricity) when it was really needed?: No   Food Insecurity: Low Risk  (11/15/2023)    Food Insecurity     Within the past 12 months, did you worry that your food would run out before you got money to buy more?: No     Within the past 12 months, did the food you bought just not last and you didn t have money to get more?: No   Transportation Needs: Low Risk  (11/15/2023)    Transportation Needs     Within the past 12 months, has lack of transportation kept you from medical appointments, getting your medicines, non-medical meetings or appointments, work, or from getting things that you need?: No   Interpersonal Safety: Low Risk  (11/15/2023)    Interpersonal Safety     Do you feel physically and emotionally safe where you currently live?: Yes     Within the past 12 months, have you been hit, slapped, kicked or otherwise physically hurt by someone?: No     Within the past 12 months, have you been humiliated or emotionally abused in other ways by your partner or ex-partner?: No   Housing Stability: Low Risk  (11/15/2023)    Housing Stability     Do you have housing? : Yes     Are you worried about losing your housing?: No        Family History  Family History   Problem Relation Age of Onset    Cancer Sister 82        bladder cancer       Outpatient Medications:  Current Facility-Administered Medications   Medication Dose Route Frequency Provider Last Rate Last Admin    acetaminophen (TYLENOL) tablet 650 mg  650 mg Oral Q4H PRN Roni Chan PA-C        Or    acetaminophen (TYLENOL) Suppository 650 mg  650 mg Rectal Q4H PRN Roni Chan PA-C        allopurinol (ZYLOPRIM) tablet 100 mg  100 mg Oral Daily Roni Chan PA-C   100 mg at 06/20/24 0802    amLODIPine (NORVASC) tablet 10 mg  10 mg Oral Daily Dustin  NUZHAT Tamayo   10 mg at 06/20/24 0802    artificial saliva (BIOTENE MT) solution 1 spray  1 spray Mouth/Throat 4x Daily Roni Chan PA-C        benzocaine-menthol (CHLORASEPTIC MAX) 15-10 MG lozenge 1 lozenge  1 lozenge Buccal Q1H PRN Roni Chan PA-C        budesonide (PULMICORT) neb solution 0.5 mg  0.5 mg Nebulization BID Blayne Rothman MD   0.5 mg at 06/20/24 0814    calcium carbonate (TUMS) chewable tablet 1,000 mg  1,000 mg Oral 4x Daily PRN Roni Chan PA-C        clopidogrel (PLAVIX) tablet 75 mg  75 mg Oral Daily Roni Chan PA-C   75 mg at 06/20/24 0802    fluticasone-vilanterol (BREO ELLIPTA) 100-25 MCG/ACT inhaler 1 puff  1 puff Inhalation Daily Roni Chan PA-C        [Held by provider] furosemide (LASIX) tablet 40 mg  40 mg Oral Daily Roni Chan PA-C        isosorbide mononitrate (ISMO/MONOKET) tablet 60 mg  60 mg Oral BID Roni Chan PA-C   60 mg at 06/20/24 0801    lidocaine (LMX4) cream   Topical Q1H PRN Roni Chan PA-C        lidocaine (LMX4) cream   Topical Q1H PRN Roni Chan PA-C        lidocaine 1 % 0.1-1 mL  0.1-1 mL Other Q1H PRN Roni Chan PA-C        lidocaine 1 % 0.1-1 mL  0.1-1 mL Other Q1H PRN Roni Chan PA-C        melatonin tablet 1 mg  1 mg Oral At Bedtime PRN Roni Chan PA-C        methocarbamol (ROBAXIN) tablet 500 mg  500 mg Oral Q6H PRN Roni Chan PA-C        metoprolol tartrate (LOPRESSOR) tablet 25 mg  25 mg Oral BID Roni Chan PA-C   25 mg at 06/20/24 0802    ondansetron (ZOFRAN ODT) ODT tab 4 mg  4 mg Oral Q6H PRN Roni Chan PA-C        Or    ondansetron (ZOFRAN) injection 4 mg  4 mg Intravenous Q6H PRN Roni Chan PA-C        pantoprazole (PROTONIX) IV push injection 40 mg  40 mg Intravenous BID Roni Chan PA-C   40 mg at 06/20/24 0802    polyethylene glycol (MIRALAX) Packet 17 g  17 g Oral BID PRN Roni Chan PA-C        prochlorperazine (COMPAZINE) injection  5 mg  5 mg Intravenous Q6H PRN Roni Chan PA-C        Or    prochlorperazine (COMPAZINE) tablet 5 mg  5 mg Oral Q6H PRN Roni Chan PA-C        Or    prochlorperazine (COMPAZINE) suppository 12.5 mg  12.5 mg Rectal Q12H PRN Roni Chan PA-C        rosuvastatin (CRESTOR) tablet 20 mg  20 mg Oral Daily Roni Chan PA-C   20 mg at 06/20/24 0801    senna-docusate (SENOKOT-S/PERICOLACE) 8.6-50 MG per tablet 1 tablet  1 tablet Oral BID PRN Roni Chan PA-C        Or    senna-docusate (SENOKOT-S/PERICOLACE) 8.6-50 MG per tablet 2 tablet  2 tablet Oral BID PRN Roni Chan PA-C        sodium chloride (PF) 0.9% PF flush 3 mL  3 mL Intracatheter Q8H Roni Chan PA-C   3 mL at 06/19/24 1438    sodium chloride (PF) 0.9% PF flush 3 mL  3 mL Intracatheter q1 min prn Roni Chan PA-C        sodium chloride (PF) 0.9% PF flush 3 mL  3 mL Intracatheter Q8H Roni Chan PA-C   3 mL at 06/20/24 0802    sodium chloride (PF) 0.9% PF flush 3 mL  3 mL Intracatheter q1 min prn Roni Chan PA-C        timolol maleate (TIMOPTIC) 0.5 % ophthalmic solution 1 drop  1 drop Both Eyes Daily Roni Chan PA-C   1 drop at 06/20/24 0927     Current Outpatient Medications   Medication Sig Dispense Refill    acetaminophen (TYLENOL) 325 MG tablet Take 975 mg by mouth nightly as needed for pain      allopurinol (ZYLOPRIM) 100 MG tablet Take 1 tablet (100 mg) by mouth daily 90 tablet 0    amLODIPine (NORVASC) 10 MG tablet Take 1 tablet (10 mg) by mouth daily 90 tablet 3    apixaban ANTICOAGULANT (ELIQUIS) 2.5 MG tablet Take 1 tablet (2.5 mg) by mouth 2 times daily 180 tablet 3    budesonide (PULMICORT) 0.5 MG/2ML neb solution Take 2 mLs (0.5 mg) by nebulization 2 times daily 2 mL 3    clopidogrel (PLAVIX) 75 MG tablet Take 1 tablet (75 mg) by mouth daily 90 tablet 1    ferrous sulfate (FEROSUL) 325 (65 Fe) MG tablet Take 1 tablet by mouth 4 times weekly on Sunday, Tuesday, Thursday, and  Saturday.      fluticasone-salmeterol (ADVAIR) 250-50 MCG/ACT inhaler INHALE 1 DOSE BY MOUTH TWICE DAILY 60 each 8    furosemide (LASIX) 40 MG tablet Take 1 tablet (40 mg) by mouth daily May take an additional 20 mg at noon as needed for swelling. 90 tablet 2    isosorbide mononitrate (ISMO/MONOKET) 20 MG tablet Take 3 tablets (60 mg) by mouth 2 times daily 180 tablet 3    methocarbamol (ROBAXIN) 500 MG tablet Take 1 tablet (500 mg) by mouth every 6 hours as needed for muscle spasms 30 tablet 0    metoprolol tartrate (LOPRESSOR) 25 MG tablet Take 1 tablet (25 mg) by mouth 2 times daily 60 tablet 0    Multiple Vitamins-Minerals (PRESERVISION AREDS 2+MULTI VIT PO) Take 1 capsule by mouth 2 times daily      omeprazole (PRILOSEC) 40 MG DR capsule Take 1 capsule (40 mg) by mouth daily 90 capsule 3    potassium chloride ER (K-TAB) 20 MEQ CR tablet Take 1 tablet (20 mEq) by mouth daily 90 tablet 3    rosuvastatin (CRESTOR) 20 MG tablet Take 1 tablet (20 mg) by mouth daily for 360 days 90 tablet 3    timolol maleate (TIMOPTIC) 0.5 % ophthalmic solution INSTILL 1 DROP INTO EACH EYE ONCE DAILY IN THE MORNING      vitamin B-12 (CYANOCOBALAMIN) 500 MCG tablet Take 1 tablet by mouth daily      vitamin D3 25 mcg (1000 units) tablet Take 1 tablet (25 mcg) by mouth every other day 90 tablet 3        Physical Exam:    Blood pressure 132/53, pulse 79, temperature 97.8  F (36.6  C), temperature source Oral, resp. rate 18, SpO2 100%, not currently breastfeeding.  General: alert and cooperative, lying in bed, no acute distress  HEENT: sclera anicteric, EOMI, MMM  Neck: supple, normal ROM  CV: paced rhythm.   Resp: CTAB, normal respiratory effort on ambient air  GI: soft, non-tender, non-distended, bowel sounds present and normoactive  MSK: warm and well-perfused, normal tone  Skin: no rashes on limited exam, no jaundice  Neuro: Alert and interactive, moves all extremities equally, no focal deficits    Labs & Studies: I personally  reviewed the following studies:  ROUTINE LABS (Last four results):  CMP  Recent Labs   Lab 06/20/24  0659 06/19/24  1355 06/19/24  1131    143 142   POTASSIUM 3.5 4.3 4.4   CHLORIDE 113* 113* 113*   CO2 17* 17* 16*   ANIONGAP 13 13 13   GLC 82 84 149*   BUN 37.7* 47.1* 47.3*   CR 1.84* 2.06* 2.00*   GFRESTIMATED 26* 23* 24*   ALEXANDRO 8.9 8.8 8.9   PROTTOTAL  --  6.4  --    ALBUMIN  --  4.0  --    BILITOTAL  --  0.4  --    ALKPHOS  --  58  --    AST  --  31  --    ALT  --  19  --      CBC  Recent Labs   Lab 06/20/24  0659 06/20/24  0309 06/19/24  2126 06/19/24  1355 06/19/24  1131   WBC 5.6  --   --  5.7 5.5   RBC 2.96*  --   --  2.20* 2.25*   HGB 9.2* 8.9* 8.2* 6.6* 6.8*   HCT 28.0*  --   --  22.6* 22.7*   MCV 95  --   --  103* 101*   MCH 31.1  --   --  30.0 30.2   MCHC 32.9  --   --  29.2* 30.0*   RDW 19.5*  --   --  20.3* 19.9*   PLT 76*  --   --  92* 90*     INR  Recent Labs   Lab 06/19/24  1355   INR 1.59*

## 2024-06-20 NOTE — CONSULTS
Physician Attestation   I saw this patient and agree with the resident/fellow's findings and plan of care as documented in the note.      Key findings:     89-year-old woman with known CAD with multiple PCI's, paroxysmal atrial fibrillation who is DOAC for an elevated stroke risk ~13%.  She comes in with hemoglobin of 6 g with an unclear source of blood loss.    Patient is appropriately treated with Plavix for CAD and DOAC to prevent future stroke making it difficult to chose either one of these to discontinue. After lengthy discussion patient wishes to continue both and will revisit the issue in the event she has a future bleed.    Miley Kennedy MD  Date of Service (when I saw the patient): June 20, 2024           Cardiology Inpatient Consultation  June 20, 2024    Reason for Consult:    A cardiology consult was requested to provide clinical guidance regarding recommendations for Plavix for cardiac stents and Eliquis for Afib, patient came with drop of Hgb to 6.6 from 8, suspect GI bleed with recent history of exploratory Laparotomy in April.    Assessment and Recommendation:  Tessa Mansfield is a 89 year old female w/ PMH notable for CAD s/p multiple PCIs (on Plavix), sick sinus syndrome s/p dual chamber pacemaker, paroxysmal atrial fibrillation (on Eliquis), CKD, HTN  admitted on 6/19/2024 for with acute hgb drop to 6.5, uncertain bleeding source though suspect GI bleed.      # CAD s/p multiple PCIs, most recently 11/2023   # Paroxysmal atrial fibrillation   # Sick sinus syndrome s/p dual chamber pacemaker   # HTN, HLD  # Acute on Chronic Normocytic Anemia   # HFpEF (Latest echo with EF >50% )     Difficult scenario as we have to balance between increased bleeding risk vs. Stroke/MI. Mrs. Mansfield has an elevated [id number] that is 6 correlating with a yearly stroke risk of 9-13%.She has a drop in her hemoglobin which could relate to a GI cause and therefore she also carries a considerable risk of bleeding.  She ambulates with a walker and denies any recent falls. She is currently on eliquis we dont have any evidence for switching eliquis to an alternative anticoagulation strategy. Moreover, she has had multiple PCI where stopping her plavix could be problematic and not advised. Switching to aspirin as anti-platelet therapy is unlikely to help reduce her bleeding risk.    After discussing with patient she wishes to continue with PLAVIX and Eliquis understanding that she is at increased risk of bleeding that could be life threatening.      Recommendations:  - Would recommend to continue both Eliquis and Plavix as per discussion with the patient.  - Would not recommend to switch to Asprin.     This patient was seen and discussed with Dr Panda Andino, cardiology fellow, and Dr Miley Kennedy, attending cardiologist, who agree with the assessment and plan unless otherwise indicated. We will sign off, please don't hesitate to contact us with any questions.     Cee Guidry  Medical Student    Panda Andino MD  Cardiology fellow  [phone]    HPI:   Tessa aMnsfield is a 89 year old female admitted here on 6/20/24 with PMH for CAD s/p multiple PCI (on Plavix), sick sinus syndrome s/p pacemaker, paroxysmal atrial fibrillation (on Eliquis), CKD, HTN. She presented to ED after routine labs demonstrated a low Hgb of 6.5 with no clear source of bleeding( no hematemesis, melena, hematochezia, hematuria, vaginal bleeding or any other identifiable source of bleeding) without any symptoms of anemia.  She was sent to the Emergency Department for possible blood transfusion. She was admitted from 04/13-04/22/24 for small bowel obstruction s/p exploratory laparotomy x3. She was readmitted from 5/01-5/7/24 for melena with hemoglobin of 8.1 at time of admission. She received 1 unit pRBC. EGD on 5/01/24 showed patchy nodular mucosa in body of stomach which had mild oozing with irritation but unlikely to cause melena. Melena was felt most  likely due to oozing AVM in the setting of DOAC and antiplatelet therapy. She received PPI IV and was transitioned to PO PPI. HGB at discharge was 8.5.     Today(6/20/24)  Patient interviewed bedside today. She is resting comfortably in bed. She has no complains of fatigue, lethargy, active bleeding, palpitations, lightheadedness or syncope. She is ready to go home.   At the time of interview, the patient denies chest pain, dyspnea at rest or with exertion, orthopnea, PND, palpitations, lightheadedness, or syncope.    Review of Systems:    Complete review of systems was performed and negative except per HPI.    PMH:  Past Medical History:   Diagnosis Date    CAD (coronary artery disease)     CAD s/p PCI+BMS to MRCA 10/2002, PCI to mLCX 2/2003, PCI+DESx2 to pLAD 11/2007, PCI+DESx1 to dRCA 12/2007, PCI+ALVAREZ to RPDA 7/2013; on indefinite DAPT  10/27/2002    10/27/2002: AMI s/p PCI+BMS (3.5x18mm Bx velocity) to mRCA 2/5/2003: Back pain; PCI+stent to mLCX c/b distal embolization/slow flow 4/11/2003: Unstable angina; PCI+stent (Hepacoat velocity) to mLAD 11/27/2007: PCI+DESx2 to pLAD (IVUS w/ calcification of LMCA and ulcerated plaque pLAD, 80% dRCA; also had 80% dLCX, too small to stent) 12/11/2007: Staged PCI. PCI+DESx1 (3.5x13mm Cypher) to dRCA (indefinite DAPT recommended at this time) 6/27/2008: Angina. mLCX stent 70% ISR. LAD and RCA stents patent. Myocardium at risk from LCX felt to be small, medical management preferred to PCI. 11/10/2009: Angina. Findings unchanged from 6/27/2008, FFR LAD 0.90. Medical management recommended. 7/23/2013: Unstable angina; PCI+ALVAREZ to mPDA. Diagnostic findings: LMCA 40% distal. LAD: pLAD stents patent mLAD 30% ISR dLAD 50% diffuse, D2 diffuse disease. LCX 80% mid ISR. RCA diffuse <30% mid, RPLAs diffuse disease, RPDA 100% occlusion.      Cardiac Pacemaker- Medtronic, dual chamber- NOT dependant 11/28/2007    CKD (chronic kidney disease), stage IV (H)     Hyperlipidemia LDL goal <70      Hypertension     Stented coronary artery 10-    RCA    Stented coronary artery 2-5-2003    LCx    Stented coronary artery 4-    LAD    Stented coronary artery 11-    LAD    Stented coronary artery 12-    RCA    Transient complete heart block (H) 11/28/2007     Active Problems:  Patient Active Problem List    Diagnosis Date Noted    Paroxysmal atrial fibrillation (H) 06/19/2024     Priority: Medium    Stage 3a chronic kidney disease (H) 06/19/2024     Priority: Medium    UGIB (upper gastrointestinal bleed) 05/01/2024     Priority: Medium    Small bowel obstruction (H) 04/13/2024     Priority: Medium    Weakness 03/17/2024     Priority: Medium    Acute cystitis with hematuria 03/17/2024     Priority: Medium    Leukocytosis, unspecified type 03/17/2024     Priority: Medium    Long term (current) use of anticoagulants 07/19/2023     Priority: Medium    Melena 07/19/2023     Priority: Medium    General weakness 07/19/2023     Priority: Medium    S/P coronary artery stent placement 07/19/2023     Priority: Medium    Antiplatelet or antithrombotic long-term use 07/19/2023     Priority: Medium    Anemia, unspecified type 07/19/2023     Priority: Medium    Renal hypertension 07/17/2023     Priority: Medium    Acute on chronic diastolic (congestive) heart failure (H) 07/17/2023     Priority: Medium    Chest pain, unspecified type 07/17/2023     Priority: Medium    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     Priority: Medium    Thrombocytopenia (H24) 08/08/2022     Priority: Medium    Status post coronary angiogram 06/17/2022     Priority: Medium    CKD (chronic kidney disease) stage 4, GFR 15-29 ml/min (H) 12/11/2020     Priority: Medium    Non-rheumatic mitral regurgitation 12/10/2019     Priority: Medium    Non-rheumatic tricuspid valve insufficiency 12/10/2019     Priority: Medium    Sick sinus syndrome (H) 10/14/2019     Priority: Medium     Added automatically from request for  surgery [id number]      Osteoarthritis of right patellofemoral joint 06/25/2018     Priority: Medium    Aftercare following surgery of the musculoskeletal system 04/05/2018     Priority: Medium    Postoperative anemia due to acute blood loss 03/22/2018     Priority: Medium    Orthostatic hypotension 03/21/2018     Priority: Medium    ACP (advance care planning) 03/20/2018     Priority: Medium     Formatting of this note is different from the original. Patient has identified Health Care Agent(s): Yes Add Health Care Agents: Yes   Health Care Agent(s): Primary Health Care Agent: Ludwin Holtebony  Relationship:Spouse Phone: [phone] Secondary Health Care Agent: Max Mansfield Relationship: son Phone: [phone] Conservator:  Relationship:  Phone:   Guardian: Relationship:  Phone:  Patient has Advance Care Plan Documents (Health Care Directive, POLST): No, Pt stated that her HCD in process of being completed.      Hyperlipidemia 03/19/2018     Priority: Medium    Hypokalemia 03/19/2018     Priority: Medium    Chronic anemia 03/19/2018     Priority: Medium    Chronic kidney disease, stage 3 (H) 03/19/2018     Priority: Medium    GERD (gastroesophageal reflux disease) 03/19/2018     Priority: Medium    Closed right femoral fracture (H) 03/18/2018     Priority: Medium    Arrhythmia 09/15/2013     Priority: Medium    NSTEMI (non-ST elevated myocardial infarction) (H) 07/23/2013     Priority: Medium    Sinus node dysfunction (H) 12/19/2012     Priority: Medium    Dizziness and giddiness 02/21/2012     Priority: Medium    Edema 02/21/2012     Priority: Medium    Esophageal reflux 02/21/2012     Priority: Medium    Gout 02/21/2012     Priority: Medium     Problem list name updated by automated process. Provider to review  Formatting of this note might be different from the original. Overview: Problem list name updated by automated process. Provider to review      Essential hypertension 02/21/2012     Priority: Medium     Problem  list name updated by automated process. Provider to review  Formatting of this note might be different from the original. Overview: Problem list name updated by automated process. Provider to review      Obstructive sleep apnea 02/21/2012     Priority: Medium    Osteomalacia 02/21/2012     Priority: Medium    Post-menopausal osteoporosis 02/21/2012     Priority: Medium     (Problem list name updated by automated process. Provider to review and confirm.)  Formatting of this note might be different from the original. Overview: (Problem list name updated by automated process. Provider to review and confirm.)      Coronary artery disease of native artery of native heart with stable angina pectoris (H24)      Priority: Medium     10/27/2002: AMI s/p PCI+BMS (3.5x18mm Bx velocity) to mRCA  2/5/2003: Back pain; PCI+stent to mLCX c/b distal embolization/slow flow  4/11/2003: Unstable angina; PCI+stent (Hepacoat velocity) to mLAD  11/27/2007: PCI+DESx2 to pLAD (IVUS w/ calcification of LMCA and ulcerated plaque pLAD, 80% dRCA; also had 80% dLCX, too small to stent)  12/11/2007: Staged PCI. PCI+DESx1 (3.5x13mm Cypher) to dRCA (indefinite DAPT recommended at this time)  6/27/2008: Angina. mLCX stent 70% ISR. LAD and RCA stents patent. Myocardium at risk from LCX felt to be small, medical management preferred to PCI.  11/10/2009: Angina. Findings unchanged from 6/27/2008, FFR LAD 0.90. Medical management recommended.  7/23/2013: Unstable angina; PCI+ALVAREZ to mPDA. Diagnostic findings: LMCA 40% distal. LAD: pLAD stents patent mLAD 30% ISR dLAD 50% diffuse, D2 diffuse disease. LCX 80% mid ISR. RCA diffuse <30% mid, RPLAs diffuse disease, RPDA 100% occlusion.         Disturbance of skin sensation 06/19/2009     Priority: Medium     Class: Chronic     Pins, needles, burning hot pain in feet for four years.  Formatting of this note might be different from the original. Overview: Pins, needles, burning hot pain in feet for four  years.      Degeneration of cervical intervertebral disc 02/08/2008     Priority: Medium    Nonallopathic lesion of cervical region 02/08/2008     Priority: Medium     Problem list name updated by automated process. Provider to review      Nonallopathic lesion of thoracic region 02/08/2008     Priority: Medium     Problem list name updated by automated process. Provider to review      Biomechanical lesion, unspecified 02/08/2008     Priority: Medium     Formatting of this note might be different from the original. Overview: Problem list name updated by automated process. Provider to review Formatting of this note might be different from the original. Overview: Problem list name updated by automated process. Provider to review      Cardiac Pacemaker- Medtronic, dual chamber- NOT dependant 11/28/2007     Priority: Medium    Transient complete heart block (H) 11/28/2007     Priority: Medium     Social History:  Social History     Tobacco Use    Smoking status: Former     Current packs/day: 0.30     Average packs/day: 0.3 packs/day for 15.0 years (4.5 ttl pk-yrs)     Types: Cigarettes    Smokeless tobacco: Never    Tobacco comments:     Quit 35+ years ago   Substance Use Topics    Drug use: No     Family History:  Family History   Problem Relation Age of Onset    Cancer Sister 82        bladder cancer       Medications:  Current Facility-Administered Medications   Medication Dose Route Frequency Provider Last Rate Last Admin    allopurinol (ZYLOPRIM) tablet 100 mg  100 mg Oral Daily Roni Chan PA-C   100 mg at 06/20/24 0802    amLODIPine (NORVASC) tablet 10 mg  10 mg Oral Daily Roni Chan PA-C   10 mg at 06/20/24 0802    artificial saliva (BIOTENE MT) solution 1 spray  1 spray Mouth/Throat 4x Daily Roni Chan PA-C        budesonide (PULMICORT) neb solution 0.5 mg  0.5 mg Nebulization BID Blayne Rothman MD   0.5 mg at 06/20/24 0814    clopidogrel (PLAVIX) tablet 75 mg  75 mg Oral Daily Dustin  NUZHAT Tamayo   75 mg at 06/20/24 0802    fluticasone-vilanterol (BREO ELLIPTA) 100-25 MCG/ACT inhaler 1 puff  1 puff Inhalation Daily Roni Chan PA-C        [Held by provider] furosemide (LASIX) tablet 40 mg  40 mg Oral Daily Roni Chan PA-C        isosorbide mononitrate (ISMO/MONOKET) tablet 60 mg  60 mg Oral BID Roni Chan PA-C   60 mg at 06/20/24 0801    metoprolol tartrate (LOPRESSOR) tablet 25 mg  25 mg Oral BID Roni Chan PA-C   25 mg at 06/20/24 0802    pantoprazole (PROTONIX) IV push injection 40 mg  40 mg Intravenous BID Roni Chan PA-C   40 mg at 06/20/24 0802    rosuvastatin (CRESTOR) tablet 20 mg  20 mg Oral Daily Roni Chan PA-C   20 mg at 06/20/24 0801    sodium chloride (PF) 0.9% PF flush 3 mL  3 mL Intracatheter Q8H Roni Chan PA-C   3 mL at 06/19/24 1438    sodium chloride (PF) 0.9% PF flush 3 mL  3 mL Intracatheter Q8H Roni Chan PA-C   3 mL at 06/20/24 0802    timolol maleate (TIMOPTIC) 0.5 % ophthalmic solution 1 drop  1 drop Both Eyes Daily Roni Chan PA-C           Current Facility-Administered Medications   Medication Dose Route Frequency Provider Last Rate Last Admin       Physical Exam:  Temp:  [97.3  F (36.3  C)-99  F (37.2  C)] 99  F (37.2  C)  Pulse:  [60-90] 60  Resp:  [15-20] 20  BP: (115-150)/(50-99) 150/58  SpO2:  [98 %-100 %] 100 %    Intake/Output Summary (Last 24 hours) at 6/20/2024 0836  Last data filed at 6/20/2024 0802  Gross per 24 hour   Intake 603 ml   Output --   Net 603 ml     GEN: pleasant, no acute distress  CV: RRR, normal s1/s2, no murmurs/rubs/s3/s4, no heave. JVP n/a   CHEST: clear to ausculation bilaterally, no rales or wheezing  ABD: soft, non-tender, normal active bowel sounds  EXTR: pulses well felt. No clubbing, cyanosis or edema.   NEURO: alert oriented, speech fluent/appropriate, motor grossly nonfocal  PSYCH: cooperative, affect appropriate, pleasant    Diagnostics:  All labs and imaging were  reviewed, of note:    CMP  Recent Labs   Lab 24  0659 24  1355 24  1131    143 142   POTASSIUM 3.5 4.3 4.4   CHLORIDE 113* 113* 113*   CO2 17* 17* 16*   ANIONGAP 13 13 13   GLC 82 84 149*   BUN 37.7* 47.1* 47.3*   CR 1.84* 2.06* 2.00*   GFRESTIMATED 26* 23* 24*   ALEXANDRO 8.9 8.8 8.9   PROTTOTAL  --  6.4  --    ALBUMIN  --  4.0  --    BILITOTAL  --  0.4  --    ALKPHOS  --  58  --    AST  --  31  --    ALT  --  19  --      CBC  Recent Labs   Lab 24  0659 24  0309 24  2126 24  1355 24  1131   WBC 5.6  --   --  5.7 5.5   RBC 2.96*  --   --  2.20* 2.25*   HGB 9.2* 8.9* 8.2* 6.6* 6.8*   HCT 28.0*  --   --  22.6* 22.7*   MCV 95  --   --  103* 101*   MCH 31.1  --   --  30.0 30.2   MCHC 32.9  --   --  29.2* 30.0*   RDW 19.5*  --   --  20.3* 19.9*   PLT 76*  --   --  92* 90*     INR  Recent Labs   Lab 24  1355   INR 1.59*     Arterial Blood GasNo lab results found in last 7 days.    Lab Results   Component Value Date    TROPI 0.021 09/15/2013    TROPI 0.034 09/15/2013    TROPI 0.015 09/15/2013    TROPI 0.210 (HH) 2013    TROPI 0.054 (H) 2013    TROPONIN 0.00 09/15/2013    TROPONIN 0.02 2013    TROPONIN 0.03 2011    TROPONIN 0.00 2010    TROPONIN 0.00 2009       EK24  Atrial-paced rhythm with prolonged AV conduction with frequent Premature ventricular complexes   Anterior infarct     Transthoracic echocardiogram: 10/9/23  Global and regional left ventricular function is normal with an EF of 55-60%.  Right ventricular function, chamber size, wall motion, and thickness are  normal.  Mild to moderate mitral insufficiency is present.  Mild tricuspid insufficiency is present.  The right ventricular systolic pressure is 35mmHg above the right atrial  pressure.  The inferior vena cava is normal.  No pericardial effusion is present.    Nuclear stress test: 23    Prox LAD to Mid LAD lesion is 40% stenosed.    Ost LAD to Prox LAD  lesion is 50% stenosed.    Prox Cx to Mid Cx lesion is 55% stenosed.    1st Diag lesion is 85% stenosed.    2nd Diag lesion is 70% stenosed.    RPDA-2 lesion is 50% stenosed.    RPDA-1 lesion is 65% stenosed.    Mid LAD lesion is 70% stenosed.

## 2024-06-20 NOTE — CONSULTS
Gastroenterology Consultation and Sign-Off  Note  GI Luminal Service    Date of Admission:  6/19/2024  Reason for Admission: Acute on Chronic Anemia  Date of Consult  6/20/2024   Requesting Physician:  Blayne Rothman MD           ASSESSMENT AND RECOMMENDATIONS:   Assessment:  Tessa Mansfield is a pleasant 89 year old female with a history of chronic anemia, CAD with history of multiple stents (most recently 11/6/2023 LAD PCI) on Apixaban and Clopidogrel, sick sinus syndrome status-post dual-chamber PPM, paroxysmal atrial fibrillation, mild-moderate mitral regurgitation, moderate tricuspid regurgitation, 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 hypertensive nephrosclerosis, hypertension, chronic cough, history of bleeding 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    Patient had an admission 4/13/2024-4/22/2024 at [facility] for small bowel obstruction status-post exploratory laparotomy 4/13/2024 with hemorrhagic bowel without necrosis, multiple areas small bowel torsion around several small adhesions status-post adhesiolysis and transverse colon epiploicae complicated by bleeding with return to the operating room 4/13/2024 for exploratory laparotomy found to have slow ooze from suture line along inferior aspect of the transverse meso colon (site of prior lysis of adhesion) that was controlled with additional suture, quick clot gauze and an Abthera device.  Patient was taken back to the OR on 4/14/2024 for abdominal re-exploration and closure that reports healthy bowel, small amount of bleeding from an area on the proximal jejunum where adhesions had been taken down, minimal clot in the abdomen, removal of quick-clot gauze, and abdominal midline wound closure. Patient was discharged to TCU on 4/22/2024.     Patient was then  admitted 5/1/2024-5/7/2024 for reported melena on Clopidogrel (Plavix) and Apixaban (Eliquis), for which the GI Luminal Service was consulted and patient underwent EGD without source of upper GI bleeding identified (no obvious abnormality to explain patient's black stools), at which time it was discussed that highest on the differential remained oozing GI angiodysplasias in the setting of DOAC and antiplatelet therapy, favoring continuing conservative management and supportive cares including IV iron supplementation and transfusing as needed with close follow-up in outpatient cardiac/nephrology anemia clinic for acute on chronic anemia.     The patient underwent routine lab work on 6/19/2024 revealing ongoing acute on chronic normocytic anemia for which the patient presented to the ED on 6/19/2024 for possible transfusion in the absence overt bleeding.        # Acute on Chronic Normocytic Anemia  # Subacute Progressive Thrombocytopenia, since 5/4/2024  # Chronic Iron Deficiency  # History of bleeding colonic angiodysplastic lesion in the ascending colon treated with clips (MR conditional) 7/21/2023  # Many Colon Polyps 7/21/2023  # Diverticulosis in Entire Colon  Patient presented with acute on chronic normocytic anemia in the absence of overt bleeding in the setting of chronic iron deficiency. Patient endorses no GI symptoms, no overt GI bleeding and brown stools.     This patient was evaluated by the GI Luminal Service during most recent admission 5/1-5/7/2024 for reported dark stools and acute on chronic anemia, status-post EGD 5/2/2024 with no obvious abnormality to explain the patient's black stools. Highest on the differential remains oozing AVM in the setting of DOAC and antiplatelet therapy with history of cardiac stents, chronic cardiac disease, chronic kidney disease, most likely AVM(s) in the small bowel versus potentially AVMs in the right colon. If ongoing evidence of overt GI bleeding with worsening  anemia concerning for ongoing blood loss, will need to consider repeat colonoscopy if patient is amenable and if it aligns with the patient's overall goals of care. However, this patient has NO overt GI bleeding with brown stools.     This admission:   - On admission, hemoglobin 6.8 g/dL (6/19), declined from 8.0 g/dL on 6/12/2024.   - Hemoglobin 6.6 g/dL (6/19) --> status-post 2 units packed RBCs --> 8.2 g/dL (6/19) --> 8.9 g/dL (6/20) --> 9.2 g/dL (6/20).    - Platelets continue to decline since 5/4/2024, now 76 10e3/uL. Defer thrombocytopenia work-up to primary team.   - Iron Studies: LOW iron saturation index, LOW serum iron, Iron binding capacity within normal limits, ferritin within normal limits.     No overt GI bleeding (hematemesis, hematochezia, bright red blood per rectum, melena [black, tarry, sticky stool]) at this time. In the absence of overt GI bleeding, the pretest probability of finding a GI endoscopically intervenable lesion is low. No acute/urgent indication for GI endoscopy at this time.      Of note: given recent abdominal surgery with adhesions, would not recommend small bowel video capsule (PillCam) , as small bowel video capsule endoscopy (PillCam) is at higher risk for getting stuck in the GI tract (with the potential to require surgery for removal). Patient would require patency capsule (dissolvable capsule) to ensure passage prior to consideration of PillCam, though still the PillCam would be at risk for getting stuck in the GI tract. The risk outweigh the benefits at this time.     In light of advanced kidney and cardiac disease plus history of colonic AVM/angiodysplasia, there is a pre-test possibility for small bowel AVM (angiodysplasia) disease contributing to the chronic iron deficiency and acute on chronic anemia picture but ablative (deep small bowel enteroscopy + Argon Plasma Coagulation [APC]) or disease-modifying therapies (subcutaneous octreotide - generally not pursued unless  patient fails iron supplementation) would not be appropriate unless active VISIBLE bleeding were demonstrated clinically. Given the multiple active medical comorbidities, aggressive/invasive interventions may not provide adequate benefit for all the risks incurred. Recommend iron repletion with IV iron infusions over oral iron as oral iron will also turn the patient's stools black, making it difficult to differentiate medication effect versus concern for GI bleeding. Patient continues to have acute on chronic anemia despite PTA oral iron.       Given acute on chronic normocytic anemia with chronic cardiac disease and chronic kidney disease and subacute progressive thrombocytopenia, recommend Hematology consult for guidance on further hematologic work-up of acute on chronic anemia and thrombocytopenia. As previously discussed and documented last admission, the patient would benefit from referral to cardiac/nephrology anemia clinic for acute on chronic normocytic anemia with ongoing hemoglobin and iron studies monitoring with consideration of orders for outpatient packed red blood cell transfusions as needed and IV iron infusions as needed.        Recommendations:  -- No indication for acute/emergent GI endoscopic intervention.  -- Diet per primary team      -- Hematology consult for acute on chronic normocytic anemia in the absence of overt bleeding (brown stools), progressive subacute thrombocytopenia.     -- Consider IV Iron supplementation going forward.     -- Continue PTA Omeprazole 40 mg PO daily.     -- Strict documentation of rectal output.  - Appreciate detailed documentation of stool appearance, including color, consistency, frequency and amount.    - Consider smearing stool thinly on white paper towel to distinguish color.     -- Regarding anticoagulation/antiplatelet therapy, from a GI perspective, no contraindication to restarting/continuing anticoagulation/antiplatelets. Defer decision and risk/benefit  discussion to primary team.     -- Maintain 2 large bore IVs, 18G or larger.  -- Continue Supportive Cares  - Adequate volume resuscitation with IVF, pRBCs.  - Monitor Hemoglobin closely. Transfuse to keep Hgb > 7 g/dL from GI standpoint.   - Monitor Platelets closely. Keep PLT > 50 10e3/uL from GI standpoint.  - Maintain hemodynamics: MAP >65 mmHg and HR <100.  - Monitor and optimize electrolytes.  - Monitor and optimize nutrition. --> Diet per primary team.   - Reposition every 30 minutes while awake.  - Encourage Ambulation as able: 4-6 walks per day.   -- Avoid NSAIDs.  -- Analgesics/Antiemetics per primary team.  -- If the patient experiences overt GI bleeding with hemodynamic instability, please page the GI Luminal Service (listed on [facility]).       Outpatient:   -- No outpatient GI Follow-up necessary.   -- Primary team to order at discharge: Referral to the Cardiac/Nephrology Anemia clinic for acute on chronic normocytic anemia for ongoing hemoglobin and iron studies monitoring, consider outpatient transfusion orders for packed red blood cell transfusions as needed and IV iron infusions as needed.       COVID status: not tested this admission.     Discussed with Dr. Lilia Justice - [facility] 10.     The inpatient gastroenterology service will sign off at this time. Thank you for allowing us to participate in the care of this patient. Please do not hesitate to page the GI service with any questions or concerns.     The patient was discussed and plan agreed upon with Dr. Star Nye, GI Luminal Service staff physician.    Overall time spent on the date of this encounter preparing to see the patient (including chart review of available notes, clinical status events, imaging and labs); discussing, ordering, coordinating recommended medications, tests or procedures; communicating with other health care professionals; and documenting the above clinical information in the electronic medical record was 70  minutes.    Brianna Michael PA-C  Inpatient Gastroenterology Service  [facility]  Text Page         History of Present Illness:   Patient seen and examined at 0900. History is obtained from patient and chart review. Patient's  Morgan is present in the room today.     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    Patient had an admission 4/13/2024-4/22/2024 at [facility] for small bowel obstruction status-post exploratory laparotomy 4/13/2024 with hemorrhagic bowel without necrosis, multiple areas small bowel torsion around several small adhesions status-post adhesiolysis and transverse colon epiploicae complicated by bleeding with return to the operating room 4/13/2024 for exploratory laparotomy found to have slow ooze from suture line along inferior aspect of the transverse meso colon (site of prior lysis of adhesion) that was controlled with additional suture, quick clot gauze and an Abthera device.  Patient was taken back to the OR on 4/14/2024 for abdominal re-exploration and closure that reports healthy bowel, small amount of bleeding from an area on the proximal jejunum where adhesions had been taken down, minimal clot in the abdomen, removal of quick-clot gauze, and abdominal midline wound closure. Patient was discharged to TCU on  4/22/2024.     Patient was then admitted 5/1/2024-5/7/2024 for reported melena on Clopidogrel (Plavix) and Apixaban (Eliquis), for which the GI Luminal Service was consulted and patient underwent EGD without source of upper GI bleeding identified (no obvious abnormality to explain patient's black stools), at which time it was discussed that highest on the differential remained oozing GI angiodysplasias in the setting of DOAC and antiplatelet therapy, favoring continuing conservative management and supportive cares including IV iron supplementation and transfusing as needed with close follow-up in outpatient cardiac/nephrology anemia clinic for acute on chronic anemia.     The patient underwent routine lab work on 6/19/2024 revealing ongoing acute on chronic normocytic anemia for which the patient presented to the ED on 6/19/2024 for possible transfusion in the absence overt bleeding.        Denies any overt bleeding - no nose bleeds, oral bleeding, no hematemesis, BRBPR, melena. Patient reports stools are brown.     Denies abdominal pain.    Reports taking every other day oral iron at home.     Has not followed up in outpatient anemia clinic and does not think there are outpatient transfusion orders for packed red blood cells available.     Discussed assessment and plan as above extensively with both the patient and  Morgan. Patient and  Morgan had no additional questions/concerns at this time.       Previous Procedures:     5/2/2024 - EGD - Dr. Jose Donaldson  Impression:            This is a 89 year old with significant cardiac history                          on plavix and apixaban and recent SBO s/p lysis of                          adhesions who presents with melena and stable                          hemoglobin.                          There was patchy nodular mucosa in body of stomach                          which had mild oozing with irritation but unlikely to                          cause melena.                           Recommend resuming antiplatelet/anticoagulation and                          monitoring for ongoing bleeding. If ongoing melena or                          new drop in hemoglobin, would consider colonoscopy.   Addendum   Iron stain with appropriate control is performed on part A.  Iron deposits are confirmed on the special stain suggestive of iron pill gastritis.    Addendum electronically signed by Anthony Canela MD on 5/6/2024 at 10:00 AM   Final Diagnosis   A.  GASTRIC BODY NODULAR REGION, BIOPSY:  -Gastric body mucosa with features of nodular reactive gastropathy  -No H. pylori-like organisms identified on routine stain  -Negative for intestinal metaplasia and dysplasia  -Iron stain has been ordered and will be reported in an addendum        7/21/2023 - Colonoscopy - Dr. Bekah Dash  Indications:           Hematochezia, Acute post hemorrhagic anemia   Impression:            - One bleeding colonic angiodysplastic lesion in the                          ascending colon. Clips (MR conditional) were placed.                          - Many polyps in the ascending and transverse colon,                          not removed given GI bleed and her age.                          - Diverticulosis in the entire examined colon.                          - No specimens collected.   Recommendation:        - Resume previous diet today.                          - Can discuss risk/benefit of repeat colonoscopy with                          polypectomy in the future as an outpatient                          - Please discuss with cardiology on resumption of                          antiplatelets and Eliquis. From GI bleed perspective,                          it will be beneficial to hold Eliquis at least                          temporarily if not permanently.                          - Can discontinue PPI.      7/20/2023 - EGD - Dr. Bekah Dash  Indications:           Acute post  hemorrhagic anemia, Melena   Impression:            - No active bleeding or blood products visualized.                          - Normal esophagus.                          - Erythematous mucosa in the gastric body.                          - Normal duodenal bulb, second portion of the duodenum                          and third portion of the duodenum.                          - No specimens collected.   Recommendation:        - Clear liquid diet today.                          - Will discuss with patient whether colonoscopy will                          be within her goals of care.      4/5/2006 - EGD - Dr. Madhuri Haji  Indications:    Follow-up of acute gastric ulcer with hemorrhage   Impression:     - Normal esophagus.                   - Normal stomach.                   - Normal examined duodenum.   Recommendation: - Use Protonix (pantoprazole) at 40 mg by mouth daily                   indefinitely.      1/31/2006 - EGD - Dr. Star Nye  Indications:         Melena   Impression:          - Normal esophagus.                        - Gastric ulcers marginally oozing blood. Biopsied.                        - Normal duodenal bulb and 2nd part of the duodenum.   Recommendation:      - Return to referring provider today. Avoid Plavix.                        Ideally, would be off ASA. Maximal PPI.   FINAL DIAGNOSIS:  Stomach, body (biopsy of ulcer):  - Antral and transitional mucosa with active chronic gastritis.  - Erosion of the surface epithelium and focal adherent  fibrinoinflammatory debris, suggestive of nearby ulceration.  - No Helicobacter pylori is identified (immunostains for H. pylori are  negative).      1/31/2006 - Colonoscopy - Dr. Star Nye  Indications:         Melena of unknown origin   Impression:          - External and internal, non bleeding, mild hemorrhoids                        were found.                        - Diverticulosis.                        - The terminal ileum is normal.    Recommendation:      - Perform upper GI endoscopy today.      1/30/2006 - EGD - Dr. Bernard Vieira  Indications:         Melena   Impression:          - Normal esophagus.                        - Red blood in the gastric fundus.                        - Normal examined duodenum.               Past Medical History:   Reviewed and edited as appropriate  Past Medical History:   Diagnosis Date    CAD (coronary artery disease)     CAD s/p PCI+BMS to MRCA 10/2002, PCI to mLCX 2/2003, PCI+DESx2 to pLAD 11/2007, PCI+DESx1 to dRCA 12/2007, PCI+ALVAREZ to RPDA 7/2013; on indefinite DAPT  10/27/2002    10/27/2002: AMI s/p PCI+BMS (3.5x18mm Bx velocity) to mRCA 2/5/2003: Back pain; PCI+stent to mLCX c/b distal embolization/slow flow 4/11/2003: Unstable angina; PCI+stent (Hepacoat velocity) to mLAD 11/27/2007: PCI+DESx2 to pLAD (IVUS w/ calcification of LMCA and ulcerated plaque pLAD, 80% dRCA; also had 80% dLCX, too small to stent) 12/11/2007: Staged PCI. PCI+DESx1 (3.5x13mm Cypher) to dRCA (indefinite DAPT recommended at this time) 6/27/2008: Angina. mLCX stent 70% ISR. LAD and RCA stents patent. Myocardium at risk from LCX felt to be small, medical management preferred to PCI. 11/10/2009: Angina. Findings unchanged from 6/27/2008, FFR LAD 0.90. Medical management recommended. 7/23/2013: Unstable angina; PCI+ALVAREZ to mPDA. Diagnostic findings: LMCA 40% distal. LAD: pLAD stents patent mLAD 30% ISR dLAD 50% diffuse, D2 diffuse disease. LCX 80% mid ISR. RCA diffuse <30% mid, RPLAs diffuse disease, RPDA 100% occlusion.      Cardiac Pacemaker- [employer]tr[employer], dual chamber- NOT dependant 11/28/2007    CKD (chronic kidney disease), stage IV (H)     Hyperlipidemia LDL goal <70     Hypertension     Stented coronary artery 10-    RCA    Stented coronary artery 2-5-2003    LCx    Stented coronary artery 4-    LAD    Stented coronary artery 11-    LAD    Stented coronary artery 12-    RCA    Transient complete heart block  (H) 11/28/2007            Past Surgical History:   Reviewed and edited as appropriate   Past Surgical History:   Procedure Laterality Date    CHOLECYSTECTOMY      CV CORONARY ANGIOGRAM N/A 6/17/2022    Procedure: Coronary Angiogram;  Surgeon: Constantine Macias MD;  Location:  HEART CARDIAC CATH LAB    CV CORONARY ANGIOGRAM N/A 7/17/2023    Procedure: Coronary Angiogram;  Surgeon: Constantine Macias MD;  Location:  HEART CARDIAC CATH LAB    CV PCI N/A 6/17/2022    Procedure: Percutaneous Coronary Intervention;  Surgeon: Constantine Macias MD;  Location:  HEART CARDIAC CATH LAB    CV PCI N/A 7/17/2023    Procedure: Percutaneous Coronary Intervention;  Surgeon: Constantine Macias MD;  Location:  HEART CARDIAC CATH LAB    CV PCI N/A 11/6/2023    Procedure: Percutaneous Coronary Intervention;  Surgeon: Constantine Mcaias MD;  Location:  HEART CARDIAC CATH LAB    CV RIGHT HEART CATH MEASUREMENTS RECORDED N/A 6/17/2022    Procedure: Right Heart Catheterization;  Surgeon: Constantine Macias MD;  Location:  HEART CARDIAC CATH LAB    EP PACEMAKER N/A 10/15/2019    Procedure: EP PACEMAKER;  Surgeon: Anthony hZu MD;  Location:  HEART CARDIAC CATH LAB    ESOPHAGOSCOPY, GASTROSCOPY, DUODENOSCOPY (EGD), COMBINED N/A 7/20/2023    Procedure: Esophagoscopy, gastroscopy, duodenoscopy (EGD), combined;  Surgeon: Bekah Dash DO;  Location:  GI    ESOPHAGOSCOPY, GASTROSCOPY, DUODENOSCOPY (EGD), COMBINED N/A 5/2/2024    Procedure: Esophagoscopy, gastroscopy, duodenoscopy (EGD), combined;  Surgeon: Tavo Donaldson MD;  Location:  GI    HC PPM INSERTION NEW/REPLACEMENT W/ ATRIAL&VENTRICULAR LEAD  11-    HYSTERECTOMY      LAPAROTOMY EXPLORATORY N/A 4/13/2024    Procedure: Laparotomy exploratory, Wound Vac Placement.;  Surgeon: Anthony Trammell MD;  Location: [facility] OR    LAPAROTOMY EXPLORATORY N/A 4/14/2024    Procedure: Abdominal Re-Exploration and Closure;  Surgeon:  "Anthony Trammell MD;  Location: [facility] OR    LAPAROTOMY EXPLORATORY N/A 4/13/2024    Procedure: Exploratory Laparotomy;  Surgeon: Anthony Trammell MD;  Location: [facility] OR              Social History:   The patient lives in [place], [place].    Alcohol: Denies.  Tobacco: Denies.  Illicit drugs: Denies.           Family History:   Patient's family history is reviewed today and is non-contributory    Family History   Problem Relation Age of Onset    Cancer Sister 82        bladder cancer             Allergies:   Reviewed and edited as appropriate     Allergies   Allergen Reactions    Codeine Sulfate Itching    Shrimp Swelling    Bactrim [Sulfamethoxazole W/Trimethoprim]      Patient unable to recall    Biaxin [Clarithromycin]     Chlorthalidone Nausea and Vomiting    Clonidine     Darvon [Propoxyphene Hcl]     Dilaudid [Hydromorphone] Visual Disturbance and Hallucination     Tolerated in April 2024 hospital admissions    Gabapentin Fatigue and Confusion     \"felt drunk\"    Indomethacin     Levaquin [Levofloxacin Hemihydrate]     Morphine Sulfate     Percocet [Oxycodone-Acetaminophen] Hallucination    Pregabalin Itching and Fatigue    Simvastatin Palpitations     Muscle weakness, leg cramping      Spironolactone      Dehydrated      Terazosin             Medications:     Current Facility-Administered Medications   Medication Dose Route Frequency Provider Last Rate Last Admin    acetaminophen (TYLENOL) tablet 650 mg  650 mg Oral Q4H PRN Roni Chan PA-C        Or    acetaminophen (TYLENOL) Suppository 650 mg  650 mg Rectal Q4H PRN Roni Chan PA-C        allopurinol (ZYLOPRIM) tablet 100 mg  100 mg Oral Daily Roni Chan PA-C   100 mg at 06/20/24 0802    amLODIPine (NORVASC) tablet 10 mg  10 mg Oral Daily Roni Chan PA-C   10 mg at 06/20/24 0802    artificial saliva (BIOTENE MT) solution 1 spray  1 spray Mouth/Throat 4x Daily Roni Chan PA-C        benzocaine-menthol (CHLORASEPTIC MAX) " 15-10 MG lozenge 1 lozenge  1 lozenge Buccal Q1H PRN Roni Chan PA-C        budesonide (PULMICORT) neb solution 0.5 mg  0.5 mg Nebulization BID Blayne Rothman MD        calcium carbonate (TUMS) chewable tablet 1,000 mg  1,000 mg Oral 4x Daily PRN Roni Chan PA-C        clopidogrel (PLAVIX) tablet 75 mg  75 mg Oral Daily Roni Chan PA-C   75 mg at 06/20/24 0802    fluticasone-vilanterol (BREO ELLIPTA) 100-25 MCG/ACT inhaler 1 puff  1 puff Inhalation Daily Roni Chan PA-C        [Held by provider] furosemide (LASIX) tablet 40 mg  40 mg Oral Daily Roni Chan PA-C        isosorbide mononitrate (ISMO/MONOKET) tablet 60 mg  60 mg Oral BID Roni Chan PA-C   60 mg at 06/20/24 0801    lidocaine (LMX4) cream   Topical Q1H PRN Roni Chan PA-C        lidocaine (LMX4) cream   Topical Q1H PRN Roni Chan PA-C        lidocaine 1 % 0.1-1 mL  0.1-1 mL Other Q1H PRN Roni Chan PA-C        lidocaine 1 % 0.1-1 mL  0.1-1 mL Other Q1H PRN Roni Chan PA-C        melatonin tablet 1 mg  1 mg Oral At Bedtime PRN Roni Chan PA-C        methocarbamol (ROBAXIN) tablet 500 mg  500 mg Oral Q6H PRN Roni Chan PA-C        metoprolol tartrate (LOPRESSOR) tablet 25 mg  25 mg Oral BID Roni Chan PA-C   25 mg at 06/20/24 0802    ondansetron (ZOFRAN ODT) ODT tab 4 mg  4 mg Oral Q6H PRN Roni Chan PA-C        Or    ondansetron (ZOFRAN) injection 4 mg  4 mg Intravenous Q6H PRN Roni Chan PA-C        pantoprazole (PROTONIX) IV push injection 40 mg  40 mg Intravenous BID Roni Chan PA-C   40 mg at 06/20/24 0802    polyethylene glycol (MIRALAX) Packet 17 g  17 g Oral BID PRN Roni Chan PA-C        prochlorperazine (COMPAZINE) injection 5 mg  5 mg Intravenous Q6H PRN Roni Chan PA-C        Or    prochlorperazine (COMPAZINE) tablet 5 mg  5 mg Oral Q6H PRN Roni Chan PA-C        Or    prochlorperazine (COMPAZINE) suppository 12.5 mg   12.5 mg Rectal Q12H PRN Roni Chan PA-C        rosuvastatin (CRESTOR) tablet 20 mg  20 mg Oral Daily Roni Chan PA-C   20 mg at 06/20/24 0801    senna-docusate (SENOKOT-S/PERICOLACE) 8.6-50 MG per tablet 1 tablet  1 tablet Oral BID PRN Roni Chan PA-C        Or    senna-docusate (SENOKOT-S/PERICOLACE) 8.6-50 MG per tablet 2 tablet  2 tablet Oral BID PRN Roni Chan PA-C        sodium chloride (PF) 0.9% PF flush 3 mL  3 mL Intracatheter Q8H Roni Chan PA-C   3 mL at 06/19/24 1438    sodium chloride (PF) 0.9% PF flush 3 mL  3 mL Intracatheter q1 min prn Roni Chan PA-C        sodium chloride (PF) 0.9% PF flush 3 mL  3 mL Intracatheter Q8H Roni Chan PA-C   3 mL at 06/20/24 0802    sodium chloride (PF) 0.9% PF flush 3 mL  3 mL Intracatheter q1 min prn Roni Chan PA-C        timolol maleate (TIMOPTIC) 0.5 % ophthalmic solution 1 drop  1 drop Both Eyes Daily Roni Chan PA-C         Current Outpatient Medications   Medication Sig Dispense Refill    acetaminophen (TYLENOL) 325 MG tablet Take 975 mg by mouth nightly as needed for pain      allopurinol (ZYLOPRIM) 100 MG tablet Take 1 tablet (100 mg) by mouth daily 90 tablet 0    amLODIPine (NORVASC) 10 MG tablet Take 1 tablet (10 mg) by mouth daily 90 tablet 3    apixaban ANTICOAGULANT (ELIQUIS) 2.5 MG tablet Take 1 tablet (2.5 mg) by mouth 2 times daily 180 tablet 3    budesonide (PULMICORT) 0.5 MG/2ML neb solution Take 2 mLs (0.5 mg) by nebulization 2 times daily 2 mL 3    clopidogrel (PLAVIX) 75 MG tablet Take 1 tablet (75 mg) by mouth daily 90 tablet 1    ferrous sulfate (FEROSUL) 325 (65 Fe) MG tablet Take 1 tablet by mouth 4 times weekly on Sunday, Tuesday, Thursday, and Saturday.      fluticasone-salmeterol (ADVAIR) 250-50 MCG/ACT inhaler INHALE 1 DOSE BY MOUTH TWICE DAILY 60 each 8    furosemide (LASIX) 40 MG tablet Take 1 tablet (40 mg) by mouth daily May take an additional 20 mg at noon as needed for  swelling. 90 tablet 2    isosorbide mononitrate (ISMO/MONOKET) 20 MG tablet Take 3 tablets (60 mg) by mouth 2 times daily 180 tablet 3    methocarbamol (ROBAXIN) 500 MG tablet Take 1 tablet (500 mg) by mouth every 6 hours as needed for muscle spasms 30 tablet 0    metoprolol tartrate (LOPRESSOR) 25 MG tablet Take 1 tablet (25 mg) by mouth 2 times daily 60 tablet 0    Multiple Vitamins-Minerals (PRESERVISION AREDS 2+MULTI VIT PO) Take 1 capsule by mouth 2 times daily      omeprazole (PRILOSEC) 40 MG DR capsule Take 1 capsule (40 mg) by mouth daily 90 capsule 3    potassium chloride ER (K-TAB) 20 MEQ CR tablet Take 1 tablet (20 mEq) by mouth daily 90 tablet 3    rosuvastatin (CRESTOR) 20 MG tablet Take 1 tablet (20 mg) by mouth daily for 360 days 90 tablet 3    timolol maleate (TIMOPTIC) 0.5 % ophthalmic solution INSTILL 1 DROP INTO EACH EYE ONCE DAILY IN THE MORNING      vitamin B-12 (CYANOCOBALAMIN) 500 MCG tablet Take 1 tablet by mouth daily      vitamin D3 25 mcg (1000 units) tablet Take 1 tablet (25 mcg) by mouth every other day 90 tablet 3             Review of Systems:     A complete review of systems was performed and is negative except as noted in the HPI           Physical Exam:   Temp: 99  F (37.2  C) Temp src: Oral BP: (!) 150/58 Pulse: 60   Resp: 20 SpO2: 100 % O2 Device: None (Room air)    Wt:   Wt Readings from Last 2 Encounters:   06/12/24 66.2 kg (145 lb 14.4 oz)   05/20/24 65.9 kg (145 lb 3.2 oz)        General: 89 year old female lying in bed in NAD. Appears comfortable.  Answers appropriately. Wearing personal clothing.  HEENT: Head is AT/NC. Sclera anicteric. +eye glasses.  Lungs: No increased work of breathing, speaking in full sentences, equal chest rise. On Room Air.   Heart: Regular rate. Peripheral perfusion intact.  Abdomen: Soft, non-tender, non-distended. No peritoneal signs.  Extremities: WWP.  Musculoskeletal: No gross deformity.  Skin: No jaundice or rash on exposed skin.  Neurologic:  Grossly non-focal.  CN 2-12 grossly intact.   Mental status/Psych: A&O. Asks/answers questions appropriately. Pleasant, interactive.             Data:   LAB WORK:    BMP  Recent Labs   Lab 06/20/24  0659 06/19/24  1355 06/19/24  1131    143 142   POTASSIUM 3.5 4.3 4.4   CHLORIDE 113* 113* 113*   ALEXANDRO 8.9 8.8 8.9   CO2 17* 17* 16*   BUN 37.7* 47.1* 47.3*   CR 1.84* 2.06* 2.00*   GLC 82 84 149*     CBC  Recent Labs   Lab 06/20/24  0659 06/19/24  2126 06/19/24  1355 06/19/24  1131   WBC 5.6  --  5.7 5.5   RBC 2.96*  --  2.20* 2.25*   HGB 9.2*   < > 6.6* 6.8*   HCT 28.0*  --  22.6* 22.7*   MCV 95  --  103* 101*   MCH 31.1  --  30.0 30.2   MCHC 32.9  --  29.2* 30.0*   RDW 19.5*  --  20.3* 19.9*   PLT 76*  --  92* 90*    < > = values in this interval not displayed.     INR  Recent Labs   Lab 06/19/24  1355   INR 1.59*     LFTs  Recent Labs   Lab 06/19/24  1355   ALKPHOS 58   AST 31   ALT 19   BILITOTAL 0.4   PROTTOTAL 6.4   ALBUMIN 4.0      PANCNo lab results found in last 7 days.    IMAGING:  -- No abdominal imaging this admission.         =======================================================================

## 2024-06-20 NOTE — PLAN OF CARE
Goal Outcome Evaluation:    BP (!) 150/58 (BP Location: Left arm)   Pulse 60   Temp 99  F (37.2  C) (Oral)   Resp 20   SpO2 100%          1245-3535    PT noted to be comfortable on this shift, VSS, denies having pain and discomfort, Hgb drawn at 0307 result 8.9.  No acute events noted.

## 2024-06-20 NOTE — PLAN OF CARE
Goal Outcome Evaluation:      Plan of Care Reviewed With: patient, spouse    Overall Patient Progress: improvingOverall Patient Progress: improving    Temp: 98.2  F (36.8  C) Temp src: Axillary BP: 134/61 Pulse: 55   Resp: 18 SpO2: 100 % O2 Device: None (Room air)    Discharge instructions reviewed with patient. All questions/concerns regarding AVS addressed and patient acknowledged understanding. PIV removed and all personal belongings secured with patient. 1x medications picked up from pharmacy and delivered to patient. Patient discharging hospital via private car w/ spouse providing transportation.

## 2024-06-20 NOTE — PROGRESS NOTES
[facility]  Patient is currently open to home care services with [facility]. The patient has  RN PT OT   services.  [facility]  and team have been notified of patient admission.  NO Need to send a  new  referral thru the HUB.  [facility] liaison will continue to follow patient during stay.  If appropriate provide orders to resume home care at time of discharge.

## 2024-06-22 LAB — EPO SERPL-ACNC: 16 MU/ML

## 2024-06-24 ENCOUNTER — OFFICE VISIT (OUTPATIENT)
Dept: NEPHROLOGY | Facility: CLINIC | Age: 89
End: 2024-06-24
Payer: COMMERCIAL

## 2024-06-24 VITALS — SYSTOLIC BLOOD PRESSURE: 104 MMHG | HEART RATE: 83 BPM | DIASTOLIC BLOOD PRESSURE: 57 MMHG | OXYGEN SATURATION: 100 %

## 2024-06-24 DIAGNOSIS — N18.4 CHRONIC KIDNEY DISEASE, STAGE IV (SEVERE) (H): Primary | ICD-10-CM

## 2024-06-24 DIAGNOSIS — E87.20 METABOLIC ACIDOSIS: ICD-10-CM

## 2024-06-24 DIAGNOSIS — N25.81 SECONDARY RENAL HYPERPARATHYROIDISM (H): ICD-10-CM

## 2024-06-24 DIAGNOSIS — D64.9 CHRONIC ANEMIA: Primary | ICD-10-CM

## 2024-06-24 DIAGNOSIS — N18.4 ANEMIA DUE TO STAGE 4 CHRONIC KIDNEY DISEASE (H): ICD-10-CM

## 2024-06-24 DIAGNOSIS — I25.118 CORONARY ARTERY DISEASE OF NATIVE ARTERY OF NATIVE HEART WITH STABLE ANGINA PECTORIS (H): ICD-10-CM

## 2024-06-24 DIAGNOSIS — D63.1 ANEMIA DUE TO STAGE 4 CHRONIC KIDNEY DISEASE (H): ICD-10-CM

## 2024-06-24 PROCEDURE — 99214 OFFICE O/P EST MOD 30 MIN: CPT | Mod: 24 | Performed by: PHYSICIAN ASSISTANT

## 2024-06-24 RX ORDER — METOPROLOL TARTRATE 25 MG/1
50 TABLET, FILM COATED ORAL 2 TIMES DAILY
COMMUNITY
Start: 2024-06-24 | End: 2024-06-27

## 2024-06-24 RX ORDER — SODIUM BICARBONATE 650 MG/1
650 TABLET ORAL 2 TIMES DAILY
Qty: 60 TABLET | Refills: 11 | Status: SHIPPED | OUTPATIENT
Start: 2024-06-24

## 2024-06-24 NOTE — PROGRESS NOTES
Nephrology Progress Note   6/24/2024    Assessment and Plan:   89 year old female with history of long standing HTN, CAD s/p multiple stents, who presents for followup of CKD stage 4, baseline SCr 1.8-2.2 mg/dL now with proteinuria. She had another angiogram with stent done June 2022 and November 2023 (she now has 10 stents)    1. CKD stage 4- baseline SCr 1.8-2.2mg/ dL, eGFR 25-29 ml/min. Mild/ minimal proteinuria now up to > 1gram, due to ? Uncontrolled BP, now better at 0.58g/g cr  - recent urine albumin/cr 2443 mg/g  Her swelling was previously occasional. She's had more swelling since taking amlodipine 10 mg daily and had been taking furosemide 40 mg BID. Now only taking 40mg once a day and swelling is OK , takes 2mg in PM if needed.  - creatinine stable at 1.8  - Has proteinuria - may be due to BP not ideally controlled. Consider SGLT2 inhibitor?  Unclear benefit at her age.  - BP 130s/70s at home. Was 104/57 in clinic  - monitor labs, relatively stable at this time  - consider ACE inhibitor , cannot see that she has been on this, given proteinuria and CKD-    - takes furosemide once a day only typically  - will check some blood pressure at home    2. Electrolytes/Acid Base status- normal.    Hypokalemia- takes potassium 20 mEq pills daily-  - metabolic acidosis- bicarbonate is lower at 17 - denies loose stools  - start sodium, bicarb 650 mg BID    3. Hypertension/Volume status-  She is low normal in the office, will have her check BP at home.   She is on amlodipine 10 mg daily, isosorbide mononitrate 60 mg TID, metoprolol 50 mg BID, furosemide 40 mg daily, an extra 0 mg in pm as needed'  - no longer on hydralazine, metoprolol dose decreased  - continue checking BP at home  - sees cardiology in July   -patient reports history of reaction/intolerance to Spironolactone, Chlorthalidone, Clonidine and Terazosin in the past   - takes prn furosemide in the afternoon as needed, and BP is close to goal.   - had new  stent to RCA placed on 7/17/23 and another in November 2023  - ECHO 7/17/23  Left ventricular function is normal.The ejection fraction is 55-60%. There is  mild hypokinesis of the basal and mid inferior segments.  The right ventricle is normal size. Global right ventricular function is  normal.  No significant valvular abnormalities.  The estimated PA systolic pressure is 32 mmHg.  IVC diameter <2.1 cm collapsing >50% with sniff suggests a normal RA pressure  of 3 mmHg.    4. Anemia- hgb at 9.2, up from low 6.8 on 6/19, seen in ED, transfused with 2 units. iron sat 9%, then up to 24% after transfusion  - she was seen by hematology and GI. Recommended to stop oral iron and start IV iron due to potential for GI bleeding and difficulty in knowing if black stools are from iron or bleeding.   - low platelet count- 76  - check CBC regularly  - refer to anemia clinic    5. BMD  - last serum calcium and phosphorus were normal  -secondary hyperparathyroidism-PTH was 84  - vit D is high normal at 60- repeat yearly.     Assessment and plan was discussed with patient and she voiced her understanding and agreement.    - return in 2 months with me and 4-6 months with Dr. Martin      Reason for Visit:  Tessa Mansfield is an 89 year old female with HTN, who presents for CKD management.     HPI:  She is a pleasant female with PMMHx significant for stage III CKD presumed secondary to hypertensive nephrosclerosis.Her renal function has been  relatively stable, ranging from 1.8-2.2 mg/dL over the years. She has history of CAD s/p multiple stents  (8 stents in all) since 2004 and a pacemaker in 2007. She follows with cardiologist Dr Santoyo and Dr Zhu.    She had elected stent placed in RCA on 7/17 to help with angina.     She was admitted from 7/19 to 7/23 for melena secondary to bleeding colonic angiodysplastic lesion, acute on chronic anemia. S/p repair.   She has been taking furosemide once a day for a while, takes 20 mg in  the afternoon as needed.    She feels a little better since blood transfusion. She has a poor appetite and has not been eating well. Per patient she has lost ~ 20 lbs since March.   She denies any further diarrhea no n/v.     She had another angiogram in November 2023, she was having chest pain (back pain ) with exertion which resolved after stent.  Her son was ?diagnosed with Albina Geponce but second opinion at [place] thinks it is more a spine / nerve impingement?      She is stressed about this but otherwise doing very well.  She will see Dr Santoyo in April     Home BP: 130s/    Baseline Cr: 1.8-2.2    ROS:  A comprehensive review of systems was obtained and negative, except as noted in the HPI or PMH.    Active Medical Problems:  Patient Active Problem List   Diagnosis    Degeneration of cervical intervertebral disc    Nonallopathic lesion of cervical region    Nonallopathic lesion of thoracic region    Coronary artery disease of native artery of native heart with stable angina pectoris (H24)    Dizziness and giddiness    Edema    Esophageal reflux    Gout    Essential hypertension    Obstructive sleep apnea    Osteomalacia    Post-menopausal osteoporosis    Cardiac Pacemaker- Medtronic, dual chamber- NOT dependant    Transient complete heart block (H)    Sinus node dysfunction (H)    Disturbance of skin sensation    NSTEMI (non-ST elevated myocardial infarction) (H)    Arrhythmia    Sick sinus syndrome (H)    Non-rheumatic mitral regurgitation    Non-rheumatic tricuspid valve insufficiency    CKD (chronic kidney disease) stage 4, GFR 15-29 ml/min (H)    Status post coronary angiogram    Secondary renal hyperparathyroidism (H24)    Thrombocytopenia (H24)    Renal hypertension    Acute on chronic diastolic (congestive) heart failure (H)    Chest pain, unspecified type    Long term (current) use of anticoagulants    Melena    General weakness    S/P coronary artery stent placement    Antiplatelet or antithrombotic  long-term use    Anemia, unspecified type    Aftercare following surgery of the musculoskeletal system    Closed right femoral fracture (H)    Hyperlipidemia    Hypokalemia    Orthostatic hypotension    Osteoarthritis of right patellofemoral joint    Postoperative anemia due to acute blood loss    Chronic anemia    ACP (advance care planning)    Chronic kidney disease, stage 3 (H)    Biomechanical lesion, unspecified    GERD (gastroesophageal reflux disease)    Weakness    Acute cystitis with hematuria    Leukocytosis, unspecified type    Small bowel obstruction (H)    UGIB (upper gastrointestinal bleed)    Paroxysmal atrial fibrillation (H)    Stage 3a chronic kidney disease (H)       Personal Hx:   Social History     Socioeconomic History    Marital status:      Spouse name: Not on file    Number of children: 4    Years of education: Not on file    Highest education level: Not on file   Occupational History     Employer: [employer]   Tobacco Use    Smoking status: Former     Current packs/day: 0.30     Average packs/day: 0.3 packs/day for 15.0 years (4.5 ttl pk-yrs)     Types: Cigarettes    Smokeless tobacco: Never    Tobacco comments:     Quit 35+ years ago   Substance and Sexual Activity    Alcohol use: Not on file    Drug use: No    Sexual activity: Not Currently     Partners: Male     Birth control/protection: Post-menopausal   Other Topics Concern     Service Not Asked    Blood Transfusions Yes    Caffeine Concern Not Asked    Occupational Exposure Not Asked    Hobby Hazards Not Asked    Sleep Concern Not Asked    Stress Concern Not Asked    Weight Concern Not Asked    Special Diet Not Asked    Back Care Not Asked    Exercise No    Bike Helmet Not Asked    Seat Belt Not Asked    Self-Exams Not Asked    Parent/sibling w/ CABG, MI or angioplasty before 65F 55M? Not Asked   Social History Narrative    Not on file     Social Determinants of Health     Financial Resource Strain: Low  "Risk  (11/15/2023)    Financial Resource Strain     Within the past 12 months, have you or your family members you live with been unable to get utilities (heat, electricity) when it was really needed?: No   Food Insecurity: Low Risk  (11/15/2023)    Food Insecurity     Within the past 12 months, did you worry that your food would run out before you got money to buy more?: No     Within the past 12 months, did the food you bought just not last and you didn t have money to get more?: No   Transportation Needs: Low Risk  (11/15/2023)    Transportation Needs     Within the past 12 months, has lack of transportation kept you from medical appointments, getting your medicines, non-medical meetings or appointments, work, or from getting things that you need?: No   Physical Activity: Not on file   Stress: Not on file   Social Connections: Not on file   Interpersonal Safety: Low Risk  (11/15/2023)    Interpersonal Safety     Do you feel physically and emotionally safe where you currently live?: Yes     Within the past 12 months, have you been hit, slapped, kicked or otherwise physically hurt by someone?: No     Within the past 12 months, have you been humiliated or emotionally abused in other ways by your partner or ex-partner?: No   Housing Stability: Low Risk  (11/15/2023)    Housing Stability     Do you have housing? : Yes     Are you worried about losing your housing?: No       Allergies:  Allergies   Allergen Reactions    Codeine Sulfate Itching    Shrimp Swelling    Bactrim [Sulfamethoxazole W/Trimethoprim]      Patient unable to recall    Biaxin [Clarithromycin]     Chlorthalidone Nausea and Vomiting    Clonidine     Darvon [Propoxyphene Hcl]     Dilaudid [Hydromorphone] Visual Disturbance and Hallucination     Tolerated in April 2024 hospital admissions    Gabapentin Fatigue and Confusion     \"felt drunk\"    Indomethacin     Levaquin [Levofloxacin Hemihydrate]     Morphine Sulfate     Percocet " [Oxycodone-Acetaminophen] Hallucination    Pregabalin Itching and Fatigue    Simvastatin Palpitations     Muscle weakness, leg cramping      Spironolactone      Dehydrated      Terazosin        Current Outpatient Medications   Medication Sig Dispense Refill    acetaminophen (TYLENOL) 325 MG tablet Take 975 mg by mouth nightly as needed for pain      allopurinol (ZYLOPRIM) 100 MG tablet Take 1 tablet (100 mg) by mouth daily 90 tablet 0    amLODIPine (NORVASC) 10 MG tablet Take 1 tablet (10 mg) by mouth daily 90 tablet 3    apixaban ANTICOAGULANT (ELIQUIS) 2.5 MG tablet Take 1 tablet (2.5 mg) by mouth 2 times daily 180 tablet 3    budesonide (PULMICORT) 0.5 MG/2ML neb solution Take 2 mLs (0.5 mg) by nebulization 2 times daily 2 mL 3    clopidogrel (PLAVIX) 75 MG tablet Take 1 tablet (75 mg) by mouth daily 90 tablet 1    ferrous sulfate (FEROSUL) 325 (65 Fe) MG tablet Take 1 tablet (325 mg) by mouth daily (with breakfast) 30 tablet 0    fluticasone-salmeterol (ADVAIR) 250-50 MCG/ACT inhaler INHALE 1 DOSE BY MOUTH TWICE DAILY 60 each 8    furosemide (LASIX) 40 MG tablet Take 1 tablet (40 mg) by mouth daily May take an additional 20 mg at noon as needed for swelling. 90 tablet 2    isosorbide mononitrate (ISMO/MONOKET) 20 MG tablet Take 3 tablets (60 mg) by mouth 2 times daily 180 tablet 3    methocarbamol (ROBAXIN) 500 MG tablet Take 1 tablet (500 mg) by mouth every 6 hours as needed for muscle spasms 30 tablet 0    metoprolol tartrate (LOPRESSOR) 25 MG tablet Take 1 tablet (25 mg) by mouth 2 times daily 60 tablet 0    Multiple Vitamins-Minerals (PRESERVISION AREDS 2+MULTI VIT PO) Take 1 capsule by mouth 2 times daily      omeprazole (PRILOSEC) 40 MG DR capsule Take 1 capsule (40 mg) by mouth daily 90 capsule 3    potassium chloride ER (K-TAB) 20 MEQ CR tablet Take 1 tablet (20 mEq) by mouth daily 90 tablet 3    rosuvastatin (CRESTOR) 20 MG tablet Take 1 tablet (20 mg) by mouth daily for 360 days 90 tablet 3    timolol  maleate (TIMOPTIC) 0.5 % ophthalmic solution INSTILL 1 DROP INTO EACH EYE ONCE DAILY IN THE MORNING      vitamin B-12 (CYANOCOBALAMIN) 500 MCG tablet Take 1 tablet by mouth daily      vitamin D3 25 mcg (1000 units) tablet Take 1 tablet (25 mcg) by mouth every other day 90 tablet 3     No current facility-administered medications for this visit.       Vitals:  /57 (BP Location: Left arm, Patient Position: Sitting, Cuff Size: Adult Regular)   Pulse 83   SpO2 100%     Exam:  General: awake and alert, appears to be in no acute distress  Neuro: normal speech  Skin: warm and dry, no visible rash  Heart: RRR  Lungs: clear bilaterally  Extremities:no LE edema      Results:  Last Comprehensive Metabolic Panel:  Sodium   Date Value Ref Range Status   06/20/2024 143 135 - 145 mmol/L Final     Comment:     Reference intervals for this test were updated on 09/26/2023 to more accurately reflect our healthy population. There may be differences in the flagging of prior results with similar values performed with this method. Interpretation of those prior results can be made in the context of the updated reference intervals.    08/26/2020 141 133 - 144 mmol/L Final     Potassium   Date Value Ref Range Status   06/20/2024 3.5 3.4 - 5.3 mmol/L Final   11/21/2022 4.8 3.4 - 5.3 mmol/L Final   08/26/2020 4.4 3.4 - 5.3 mmol/L Final     Potassium POCT   Date Value Ref Range Status   04/13/2024 4.0 3.4 - 5.3 mmol/L Final     Chloride   Date Value Ref Range Status   06/20/2024 113 (H) 98 - 107 mmol/L Final   11/21/2022 110 (H) 94 - 109 mmol/L Final   08/26/2020 111 (H) 94 - 109 mmol/L Final     Carbon Dioxide   Date Value Ref Range Status   08/26/2020 24 20 - 32 mmol/L Final     Carbon Dioxide (CO2)   Date Value Ref Range Status   06/20/2024 17 (L) 22 - 29 mmol/L Final   11/21/2022 24 20 - 32 mmol/L Final     Anion Gap   Date Value Ref Range Status   06/20/2024 13 7 - 15 mmol/L Final   11/21/2022 6 3 - 14 mmol/L Final   08/26/2020 8 3  - 14 mmol/L Final     Glucose   Date Value Ref Range Status   06/20/2024 82 70 - 99 mg/dL Final   11/21/2022 79 70 - 99 mg/dL Final   08/26/2020 84 70 - 99 mg/dL Final     GLUCOSE BY METER POCT   Date Value Ref Range Status   04/21/2024 143 (H) 70 - 99 mg/dL Final     Urea Nitrogen   Date Value Ref Range Status   06/20/2024 37.7 (H) 8.0 - 23.0 mg/dL Final   11/21/2022 35 (H) 7 - 30 mg/dL Final   08/26/2020 41 (H) 7 - 30 mg/dL Final     Creatinine   Date Value Ref Range Status   06/20/2024 1.84 (H) 0.51 - 0.95 mg/dL Final   08/26/2020 1.70 (H) 0.52 - 1.04 mg/dL Final     GFR Estimate   Date Value Ref Range Status   06/20/2024 26 (L) >60 mL/min/1.73m2 Final     Comment:     eGFR calculated using 2021 CKD-EPI equation.   08/26/2020 27 (L) >60 mL/min/[1.73_m2] Final     Comment:     Non  GFR Calc  Starting 12/18/2018, serum creatinine based estimated GFR (eGFR) will be   calculated using the Chronic Kidney Disease Epidemiology Collaboration   (CKD-EPI) equation.       Calcium   Date Value Ref Range Status   06/20/2024 8.9 8.8 - 10.2 mg/dL Final   08/26/2020 9.0 8.5 - 10.1 mg/dL Final       Last Basic Metabolic Panel:  Lab Results   Component Value Date     11/08/2017      Lab Results   Component Value Date    POTASSIUM 3.5 11/08/2017     Lab Results   Component Value Date    CHLORIDE 104 11/08/2017     Lab Results   Component Value Date    ALEXANDRO 8.8 11/08/2017     Lab Results   Component Value Date    CO2 26 11/08/2017     Lab Results   Component Value Date    BUN 54 11/08/2017     Lab Results   Component Value Date    CR 2.36 11/08/2017     Lab Results   Component Value Date    GLC 88 11/08/2017       Laura Llanes PA-C    Visit length 15 minutes. An additional 15 minutes were spent on date of service in chart review, documentation, and other acitivies as noted.

## 2024-06-24 NOTE — PATIENT INSTRUCTIONS
Will refer to anemia clinic to get iron infusions/epo. Stop iron supplement once you start iron infusions.   Check blood pressure daily at home, keep log.   Avoid ibuprofen/aleve.   Continue good hydration, low sodium diet.   Labs and follow up in 2 months with Laura Llanes and 4-6 months with Dr. Martin.

## 2024-06-25 ENCOUNTER — PATIENT OUTREACH (OUTPATIENT)
Dept: CARE COORDINATION | Facility: CLINIC | Age: 89
End: 2024-06-25
Payer: COMMERCIAL

## 2024-06-25 DIAGNOSIS — D63.1 ANEMIA OF CHRONIC RENAL FAILURE, STAGE 4 (SEVERE) (H): Primary | ICD-10-CM

## 2024-06-25 DIAGNOSIS — N18.4 ANEMIA OF CHRONIC RENAL FAILURE, STAGE 4 (SEVERE) (H): Primary | ICD-10-CM

## 2024-06-25 RX ORDER — ALBUTEROL SULFATE 0.83 MG/ML
2.5 SOLUTION RESPIRATORY (INHALATION)
Status: CANCELLED | OUTPATIENT
Start: 2024-06-25

## 2024-06-25 RX ORDER — HEPARIN SODIUM,PORCINE 10 UNIT/ML
5-20 VIAL (ML) INTRAVENOUS DAILY PRN
Status: CANCELLED | OUTPATIENT
Start: 2024-06-25

## 2024-06-25 RX ORDER — DIPHENHYDRAMINE HYDROCHLORIDE 50 MG/ML
50 INJECTION INTRAMUSCULAR; INTRAVENOUS
Status: CANCELLED
Start: 2024-06-25

## 2024-06-25 RX ORDER — HEPARIN SODIUM (PORCINE) LOCK FLUSH IV SOLN 100 UNIT/ML 100 UNIT/ML
5 SOLUTION INTRAVENOUS
Status: CANCELLED | OUTPATIENT
Start: 2024-06-25

## 2024-06-25 RX ORDER — ALBUTEROL SULFATE 90 UG/1
1-2 AEROSOL, METERED RESPIRATORY (INHALATION)
Status: CANCELLED
Start: 2024-06-25

## 2024-06-25 RX ORDER — EPINEPHRINE 1 MG/ML
0.3 INJECTION, SOLUTION, CONCENTRATE INTRAVENOUS EVERY 5 MIN PRN
Status: CANCELLED | OUTPATIENT
Start: 2024-06-25

## 2024-06-25 RX ORDER — METHYLPREDNISOLONE SODIUM SUCCINATE 125 MG/2ML
125 INJECTION, POWDER, LYOPHILIZED, FOR SOLUTION INTRAMUSCULAR; INTRAVENOUS
Status: CANCELLED
Start: 2024-06-25

## 2024-06-25 RX ORDER — MEPERIDINE HYDROCHLORIDE 25 MG/ML
25 INJECTION INTRAMUSCULAR; INTRAVENOUS; SUBCUTANEOUS EVERY 30 MIN PRN
Status: CANCELLED | OUTPATIENT
Start: 2024-06-25

## 2024-06-25 NOTE — DISCHARGE SUMMARY
[facility]  Hospitalist Discharge Summary      Date of Admission:  6/19/2024  Date of Discharge:  6/20/2024  6:28 PM  Discharging Provider: Lilia Justice MD  Discharge Service: Hospitalist Service, GOLD TEAM 10    Discharge Diagnoses     #Acute blood loss anemia  #Hx of SBO s/p exploratory laparotomy   #CAD s/p multiple PCIs, most recently 11/2023  #Paroxysmal atrial fibrillation  #Sick sinus syndrome s/p dual chamber pacemaker   #HTN  #HLD  #HFpEF (# Chronic heart failure with preserved ejection fraction: heart failure noted on problem list and last echo with EF >50% )    Clinically Significant Risk Factors          Follow-ups Needed After Discharge   Follow-up Appointments     Adult [facility] Follow-up and recommended labs and tests      Follow up with primary care provider, Pedro Gomez, within 14 days   for hospital follow- up.  The following labs/tests are recommended: CBC in   1-2 weeks, iron panel in 4 weeks (orders placed).      Appointments on [place] and/or [facility] (with [facility] or [facility]   provider or service). Call [phone] if you haven't heard regarding   these appointments within 7 days of discharge.            Unresulted Labs Ordered in the Past 30 Days of this Admission       No orders found from 5/20/2024 to 6/20/2024.        These results will be followed up by N/A    Discharge Disposition   Discharged to home  Condition at discharge: Stable    Hospital Course   Tessa Mansfield is a 89 year old female w/ PMH notable for CAD s/p multiple PCIs (on Plavix), sick sinus syndrome s/p pacemaker, paroxysmal atrial fibrillation with CHADSVASC 6 (on Eliquis), CKD, HTN  admitted on 6/19/2024 following outpatient labs demonstrating acute on chronic anemia with Hgb 6.5. Patient noted fatigue but was otherwise without signs or symptoms of bleed. Seen by GI, hematology, and cardiology on current hospitalization. Multifactorial anemia is  secondary to anemia of chronic disease and suspected slow ooze from AVM in patient on apixaban and clopidogrel. Patient jointly decided under cardiology guidance to continue both clopidogrel and apixaban following discussions of risks/benefits, particularly in the absence of overt bleeding and with her elevated cardiac and stroke risk. Hematology deferred to cardiology. No indication for EGD per GI in absence of overt bleeding with additional recommendations as outlined below. Would recommend avoiding future admissions for acute anemia in absence of outward symptoms of bleed which may require additional intervention or potential endoscopy.     #Acute blood loss anemia  #Hx of SBO s/p exploratory laparotomy   Presented w/ hgb of 6.5 on labs drawn for routing outpatient monitoring. Hemodynamically stable and no overt bleeding. Transfused 2 unit PRBCs with improvement in Hgb to ~9 at time of discharge. Admitted from 5/1- 5/7/24 for melena with EGD on that hospitalization demonstrating felt likely caused by AVM oozing in setting of Plavix and Eliquis use. GI consulted on current hospitalization with no EGD indicated in absence of acute bleed, continued PPI BID, and consideration of IV iron. Hematology consulted for acute on chronic anemia and chronic progressive thrombocytopenia (below) with recommendations for increased PO iron supplementation to daily with PCP follow-up. Deferred recommendations for anticoagulation to cardiology. Patient jointly decided under cardiology guidance to continue both clopidogrel and apixaban following discussions of risks/benefits, particularly in the absence of overt bleeding and with her elevated cardiac and stroke risk. She will follow up with her primary cardiologist for further discussion should she have overt complications on this regimen.  - Resume PTA apixaban, continue PTA clopidogrel  - Continue PPI BID  - Increase PO iron supplementation to daily (noting that GI has recommended  parenteral iron for improved stool monitoring)  - Follow up with PCP with repeat iron studies in 1 month with consideration of hematology referral for IV iron infusion if not improved    #CAD s/p multiple PCIs, most recently 11/2023  #Paroxysmal atrial fibrillation  #Sick sinus syndrome s/p dual chamber pacemaker   #HTN  #HLD  #HFpEF (# Chronic heart failure with preserved ejection fraction: heart failure noted on problem list and last echo with EF >50% )  Has 3 LAD stents, 1 L circumflex stent, 2 RCA stents, 1 R posterior descending artery stent. Most recent stent was placed in Mid LAD in November 2023. On Plavis and Eliquis PTA. Admission hgb of 6.5 appears to be the lowest hgb to date. There is documentation of discussion w/ pt's cardiologist regarding her anti-platelet and DOAC use. It was decided that if her hgb drops <8, the recommendation would be to stop Eliquis and continue Plavix. Care coordination in the ED did speak w/ the patient's cardiologist to confirm this plan on 6/19/24 (see note).  Follows w/ Dr. Santoyo and Randa Ann CNP for Cardiology. Last echo in Oct 2023-- EF 55-60%. Also w/ mitral and tricuspid valve insufficiency. On Lasix for BL LE swelling.   - Resume PTA apixaban, continue PTA clopidogrel  - PTA rosuvastatin 20mg daily  - Resumed PTA lasix on discharge  - Continue PTA amlodipine 10mg daily, isosorbide mononitrate 60mg BID, metoprolol 25mg BID     #CKD: Baseline Cr variable-- recently ~1.3-1.5 with recent increase to 1.8-2. On admission, Cr 2.06. IVF as above. Follow Cr w/ daily BMP.  #Cough variant asthma: Continue PTA pulmocort neb BID, Advair (subbed here w/ Samantha Gonzalez)  #Glaucoma: Continue PTA timolol eye drops  #Gout: Continue PTA allopurinol     Consultations This Hospital Stay   GI LUMINAL ADULT IP CONSULT  CARDIOLOGY GENERAL ADULT IP CONSULT  PHYSICAL THERAPY ADULT IP CONSULT  OCCUPATIONAL THERAPY ADULT IP CONSULT  HEMATOLOGY ADULT IP CONSULT    Code Status    Prior    Time Spent on this Encounter   I, Lilia Justice MD, personally saw the patient today and spent greater than 30 minutes discharging this patient.       Lilia Justice MD  [facility] EMERGENCY DEPARTMENT  [address]  [place] [address]  Phone: [phone]  ______________________________________________________________________    Physical Exam   Vital Signs:                    Weight: 0 lbs 0 oz  General Appearance:  Appears comfortable in bed, NAD.   Respiratory: Lungs CTAB, breathing comfortably on room air.   Cardiovascular: RRR, no murmur appreciated. Quiet heart sounds.   GI: Abdomen is soft and non distended.  Skin: Warm and dry.   Other:  Pleasant and interactive. Alert and oriented.            Primary Care Physician   Pedro Gomez    Discharge Orders      CBC with platelets     Iron and iron binding capacity     Ferritin     Reason for your hospital stay    You were hospitalized with anemia. You were given blood with an improvement in your blood count. You were seen by hematology, cardiology, and gastroenterology. The cardiologists discussed whether or not to change your current eliquis and plavix prescriptions. You decided along with the cardiologists to continue taking eliquis and plavix to prevent stroke or heart attack. You were also seen by the hematologists about your low hemoglobin and platelet count. You will need to increase your iron dose to daily for now for the hemoglobin. Please follow up with your primary care doctor to follow your blood cell counts in the next two weeks and to recheck your iron levels in the next month.    Medication changes:  Increase iron to 325 mg daily     Follow up:  PCP with labs in 2 weeks and again in 4 weeks      Please seek medication attention for any new or worsening bruising, bleeding, blood in the stool, lightheadedness.    Thank you for allowing me to participate in your care.    iLlia Hong  MD Reshma  Hospitalist     Activity    Your activity upon discharge: activity as tolerated     Adult [facility] Follow-up and recommended labs and tests    Follow up with primary care provider, Pedro Gomez, within 14 days for hospital follow- up.  The following labs/tests are recommended: CBC in 1-2 weeks, iron panel in 4 weeks (orders placed).      Appointments on [place] and/or [facility] (with [facility] or [facility] provider or service). Call [phone] if you haven't heard regarding these appointments within 7 days of discharge.     Diet    Follow this diet upon discharge: Orders Placed This Encounter      Regular Diet Adult       Significant Results and Procedures   Most Recent 3 CBC's:  Recent Labs   Lab Test 06/20/24  0659 06/20/24  0309 06/19/24  2126 06/19/24  1355   WBC 5.6 6.7  --  5.7   HGB 9.2* 8.9*  8.9* 8.2* 6.6*   MCV 95 99  --  103*   PLT 76* 73*  --  92*     Most Recent 3 INR's:  Recent Labs   Lab Test 06/19/24  1355 05/02/24  0449 05/01/24  1828   INR 1.59* 1.30* 1.33*     Most Recent Anemia Panel:  Recent Labs   Lab Test 06/20/24  0659 05/02/24  0614 05/01/24  1828 05/01/24  1101   WBC 5.6   < > 5.4 5.5   HGB 9.2*   < > 8.2* 8.1*   HCT 28.0*   < > 26.2* 26.1*   MCV 95   < > 88 88   PLT 76*   < > 172 189   IRON 64   < >  --  67   IRONSAT 24   < >  --  28   RETICABSCT 0.168*   < > 0.087 0.091   RETP 5.7*   < > 2.9* 3.0*      < >  --  236*   EMILY 79   < >  --  264   B12  --   --   --  3,761*   FOLIC  --   --  28.3  --    EPOE 16  --   --   --     < > = values in this interval not displayed.   ,   Results for orders placed or performed in visit on 05/28/24   Cardiac Device Check - Remote (Standing ORD 5 count)     Value    Date Time Interrogation Session 20,240,528,095,954    Implantable Pulse Generator  Medtronic    Implantable Pulse Generator Model W1DR01 Beatrice DIA    Implantable Pulse Generator Serial Number [id number]    Type Interrogation Session Remote Patient  Initiated    Clinic Name [facility]    Implantable Pulse Generator Type Pacemaker    Implantable Pulse Generator Implant Date 20,191,015    Implantable Lead  Medtronic    Implantable Lead Model 5076 CapSureFix Novus    Implantable Lead Serial Number [id number]    Implantable Lead Implant Date 20,071,128    Implantable Lead Polarity Type Bipolar Lead    Implantable Lead Location Detail 1 APPENDAGE    Implantable Lead Special Function 45 Length    Implantable Lead Location Right Atrium    Implantable Lead Connection Status Connected    Implantable Lead  Medtronic    Implantable Lead Model 5076 CapSureFix Novus    Implantable Lead Serial Number [id number]    Implantable Lead Implant Date 20071128    Implantable Lead Polarity Type Bipolar Lead    Implantable Lead Location Detail 1 APEX    Implantable Lead Special Function 52 Length    Implantable Lead Location Right Ventricle    Implantable Lead Connection Status Connected    Seth Setting Mode (NBG Code) MVP_AAIR_DDDR    Seth Setting Lower Rate Limit 60    Seth Setting Maximum Tracking Rate 130    Seth Setting Maximum Sensor Rate 130    Seth Setting Hysterisis Rate DISABLED    Seth Setting JULIANN Delay Low 150    Seth Setting PAV Delay Low 180    Seth Setting AT Mode Switch Rate 171    Lead Channel Setting Sensing Polarity Bipolar    Lead Channel Setting Sensing Anode Location Right Atrium    Lead Channel Setting Sensing Anode Terminal Ring    Lead Channel Setting Sensing Cathode Location Right Atrium    Lead Channel Setting Sensing Cathode Terminal Tip    Lead Channel Setting Sensing Sensitivity 0.3    Lead Channel Setting Sensing Polarity Bipolar    Lead Channel Setting Sensing Anode Location Right Ventricle    Lead Channel Setting Sensing Anode Terminal Ring    Lead Channel Setting Sensing Cathode Location Right Ventricle    Lead Channel Setting Sensing Cathode Terminal Tip    Lead Channel Setting Sensing  Sensitivity 0.9    Lead Channel Setting Pacing Polarity Bipolar    Lead Channel Setting Pacing Anode Location Right Atrium    Lead Channel Setting Pacing Anode Terminal Ring    Lead Channel Setting Sensing Cathode Location Right Atrium    Lead Channel Setting Sensing Cathode Terminal Tip    Lead Channel Setting Pacing Pulse Width 0.4    Lead Channel Setting Pacing Amplitude 1.5    Lead Channel Setting Pacing Capture Mode Adaptive    Lead Channel Setting Pacing Polarity Bipolar    Lead Channel Setting Pacing Anode Location Right Ventricle    Lead Channel Setting Pacing Anode Terminal Ring    Lead Channel Setting Sensing Cathode Location Right Ventricle    Lead Channel Setting Sensing Cathode Terminal Tip    Lead Channel Setting Pacing Pulse Width 0.4    Lead Channel Setting Pacing Amplitude 2    Lead Channel Setting Pacing Capture Mode Adaptive    Zone Setting Type Category VF    Zone Setting Vendor Type Category V High Rate    Zone Setting Type Category VT    Zone Setting Vendor Type Category FastVT    Zone Setting Type Category VT    Zone Setting Vendor Type Category VT    Zone Setting Type Category VT    Zone Setting Vendor Type Category MonVT    Zone Setting Status Monitor    Zone Setting Detection Interval 400    Zone Setting Type Category ATRIAL_FIBRILLATION    Zone Setting Vendor Type Category FastATAF    Zone Setting Type Category AT/AF    Zone Setting Status Monitor    Zone Setting Detection Interval 350    Lead Channel Impedance Value 361    Lead Channel Impedance Value 323    Lead Channel Sensing Intrinsic Amplitude 3.375    Lead Channel Sensing Intrinsic Amplitude 3.375    Lead Channel Pacing Threshold Amplitude 0.5    Lead Channel Pacing Threshold Pulse Width 0.4    Lead Channel Impedance Value 418    Lead Channel Impedance Value 380    Lead Channel Sensing Intrinsic Amplitude 8.375    Lead Channel Sensing Intrinsic Amplitude 8.375    Lead Channel Pacing Threshold Amplitude 1    Lead Channel Pacing  Threshold Pulse Width 0.4    Battery Date Time of Measurements 20,240,528,091,502    Battery Status OK    Battery RRT Trigger 2.625    Battery Remaining Longevity 110    Battery Voltage 3.00    Seth Statistic Date Time Start 20,240,411,035,741    Seth Statistic Date Time End 20,240,528,095,954    Seth Statistic RA Percent Paced 27.5    Seth Statistic RV Percent Paced 0.12    Seth Statistic AP  Percent 0.02    Seth Statistic AS  Percent 0.04    Seth Statistic AP VS Percent 25.13    Seth Statistic AS VS Percent 74.83    Atrial Tachy Statistic Date Time Start 20,240,411,035,741    Atrial Tachy Statistic Date Time End 20,240,528,095,954    Atrial Tachy Statistic AT/AF [place] Percent 6.6    Therapy Statistic Recent Date Time Start 20,240,411,035,741    Therapy Statistic Recent Date Time End 20,240,528,095,954    Therapy Statistic Total  Date Time Start 20,191,015,142,221    Therapy Statistic Total  Date Time End 20,240,528,095,954    Episode Statistic Recent Count 3    Episode Statistic Type Category AT/AF    Episode Statistic Recent Count 0    Episode Statistic Type Category Patient Activated    Episode Statistic Recent Count 1    Episode Statistic Type Category SVT    Episode Statistic Recent Count 1    Episode Statistic Type Category VT    Episode Statistic Recent Count 0    Episode Statistic Type Category VT    Episode Statistic Recent Date Time Start 20,240,411,035,741    Episode Statistic Recent Date Time End 20,240,528,095,954    Episode Statistic Recent Date Time Start 18648648643337    Episode Statistic Recent Date Time End 18081769063382    Episode Statistic Recent Date Time Start 97466803244754    Episode Statistic Recent Date Time End 66743085773833    Episode Statistic Recent Date Time Start 75828058308313    Episode Statistic Recent Date Time End 01464777823204    Episode Statistic Recent Date Time Start 16460781273724    Episode Statistic Recent Date Time End 20240528095954    Episode Statistic  Total Count 10    Episode Statistic Type Category AT/AF    Episode Statistic Total Count 0    Episode Statistic Type Category Patient Activated    Episode Statistic Total Count 1    Episode Statistic Type Category SVT    Episode Statistic Total Count 1    Episode Statistic Type Category VT    Episode Statistic Total Count 0    Episode Statistic Type Category VT    Episode Statistic Total Date Time Start 20,191,015,142,221    Episode Statistic Total Date Time End 20,240,528,095,954    Episode Statistic Total Date Time Start 95414250978924    Episode Statistic Total Date Time End 94693826274344    Episode Statistic Total Date Time Start 43439614024840    Episode Statistic Total Date Time End 40654304371625    Episode Statistic Total Date Time Start 58157081607915    Episode Statistic Total Date Time End 12626968371709    Episode Statistic Total Date Time Start 24672574648176    Episode Statistic Total Date Time End 59465910852015    Episode Identifier 12    Episode Type Category SVT    Episode Date Time 20,240,420,032,019    Episode Duration 7    Episode Identifier 13    Episode Type Category VT    Episode Date Time 08267140092937    Episode Duration 1    Episode Identifier 11    Episode Type Category Monitor    Episode Date Time 44751747610535    Episode Duration 70,199    Episode Identifier 10    Episode Type Category Monitor    Episode Date Time 75480248730952    Episode Duration 199,128    Episode Identifier 9    Episode Type Category Monitor    Episode Date Time 54073694752860    Episode Duration 28    Narrative    Remote pacemaker transmission received and reviewed. Device transmission   sent per MD orders.    Device: Medtronic W1DR01 Beatrice XT DR MRI  Normal Device Function  Mode: AAIR<=>DDDR  bpm   AP: 27.5%  : 0.1%  Presenting EGM: AP-VS with bigeminal PVCs  Short V-V intervals: 0  Lead Trends Appear Stable: Yes  Estimated battery longevity to RRT = 9.1 years. Battery voltage = 3.01 V.   Atrial  Arrhythmia: 3  AT/AF episodes recorded - 28 sec-55 hrs 18 min.  AF [place]: 6.6%  Anticoagulant: Eliquis  1 Fast A & V episode recorded - 7 sec, 162 bpm. Stored EGM reveals AF with   RVR.  Ventricular Arrhythmia: 1 episode recorded as NSVT - 2 sec, 174 bpm.   Stored EGM reveals 1:1 AV conduction suggestive of SVT.  PVC Singles = 246.6/hour  PVC Runs = 50.9/hour  Randa Seth notified of results.    Plan: Device follow-up every 3 months.  GERALDO Gomez RN    Remote pacemaker transmission    I have reviewed and interpreted the device interrogation, settings,   programming and nurse's summary. The device is functioning within normal   device parameters. I agree with the current findings, assessment and plan.       *Note: Due to a large number of results and/or encounters for the requested time period, some results have not been displayed. A complete set of results can be found in Results Review.       Discharge Medications   Discharge Medication List as of 6/20/2024  5:48 PM        CONTINUE these medications which have CHANGED    Details   ferrous sulfate (FEROSUL) 325 (65 Fe) MG tablet Take 1 tablet (325 mg) by mouth daily (with breakfast) for 30 days Take 1 tablet by mouth 4 times weekly on Sunday, Tuesday, Thursday, and Saturday., Disp-30 tablet, R-0, E-Prescribe           CONTINUE these medications which have NOT CHANGED    Details   acetaminophen (TYLENOL) 325 MG tablet Take 975 mg by mouth nightly as needed for pain, Historical      allopurinol (ZYLOPRIM) 100 MG tablet Take 1 tablet (100 mg) by mouth daily, Disp-90 tablet, R-0, E-Prescribe      amLODIPine (NORVASC) 10 MG tablet Take 1 tablet (10 mg) by mouth daily, Disp-90 tablet, R-3, E-Prescribe      apixaban ANTICOAGULANT (ELIQUIS) 2.5 MG tablet Take 1 tablet (2.5 mg) by mouth 2 times daily, Disp-180 tablet, R-3, E-Prescribe      budesonide (PULMICORT) 0.5 MG/2ML neb solution Take 2 mLs (0.5 mg) by nebulization 2 times daily, Disp-2 mL, R-3, E-Prescribe       clopidogrel (PLAVIX) 75 MG tablet Take 1 tablet (75 mg) by mouth daily, Disp-90 tablet, R-1, E-Prescribe      fluticasone-salmeterol (ADVAIR) 250-50 MCG/ACT inhaler INHALE 1 DOSE BY MOUTH TWICE DAILY, Disp-60 each, R-8, E-Prescribe      furosemide (LASIX) 40 MG tablet Take 1 tablet (40 mg) by mouth daily May take an additional 20 mg at noon as needed for swelling., Disp-90 tablet, R-2, E-PrescribePlease take an additional 20 mgs for 3 days at noon. After that, may take additional 20 mg at noon as needed for swelling      isosorbide mononitrate (ISMO/MONOKET) 20 MG tablet Take 3 tablets (60 mg) by mouth 2 times daily, Disp-180 tablet, R-3, E-Prescribe      methocarbamol (ROBAXIN) 500 MG tablet Take 1 tablet (500 mg) by mouth every 6 hours as needed for muscle spasms, Disp-30 tablet, R-0, E-Prescribe      Multiple Vitamins-Minerals (PRESERVISION AREDS 2+MULTI VIT PO) Take 1 capsule by mouth 2 times daily, Historical      omeprazole (PRILOSEC) 40 MG DR capsule Take 1 capsule (40 mg) by mouth daily, Disp-90 capsule, R-3, E-Prescribe      potassium chloride ER (K-TAB) 20 MEQ CR tablet Take 1 tablet (20 mEq) by mouth daily, Disp-90 tablet, R-3, E-Prescribe      rosuvastatin (CRESTOR) 20 MG tablet Take 1 tablet (20 mg) by mouth daily for 360 days, Disp-90 tablet, R-3, E-Prescribe      timolol maleate (TIMOPTIC) 0.5 % ophthalmic solution INSTILL 1 DROP INTO EACH EYE ONCE DAILY IN THE MORNING, Historical      vitamin B-12 (CYANOCOBALAMIN) 500 MCG tablet Take 1 tablet by mouth daily, Historical      vitamin D3 25 mcg (1000 units) tablet Take 1 tablet (25 mcg) by mouth every other day, Disp-90 tablet, R-3, E-Prescribe      metoprolol tartrate (LOPRESSOR) 25 MG tablet Take 1 tablet (25 mg) by mouth 2 times daily, Disp-60 tablet, R-0, E-Prescribe           Allergies   Allergies   Allergen Reactions    Codeine Sulfate Itching    Shrimp Swelling    Bactrim [Sulfamethoxazole W/Trimethoprim]      Patient unable to recall    Biaxin  "[Clarithromycin]     Chlorthalidone Nausea and Vomiting    Clonidine     Darvon [Propoxyphene Hcl]     Dilaudid [Hydromorphone] Visual Disturbance and Hallucination     Tolerated in April 2024 hospital admissions    Gabapentin Fatigue and Confusion     \"felt drunk\"    Indomethacin     Levaquin [Levofloxacin Hemihydrate]     Morphine Sulfate     Percocet [Oxycodone-Acetaminophen] Hallucination    Pregabalin Itching and Fatigue    Simvastatin Palpitations     Muscle weakness, leg cramping      Spironolactone      Dehydrated      Terazosin      "

## 2024-06-25 NOTE — PROGRESS NOTES
Anemia Management Note - Enrollment    SUBJECTIVE/OBJECTIVE:    Referred by Dr. Marzena Martin on 2024  Primary Diagnosis: Anemia in Chronic Kidney Disease (N18.4, D63.1)     Secondary Diagnosis: Chronic Kidney Disease, Stage 4 (N18.4)   Hgb goal range: 9-10    Epo/Darbo: TBD; treat with IV Iron per clinic note on 24.   Iron regimen: Treat with IV Iron; Oral Iron stopped  *Note form 24; Recommended to stop oral iron and start IV iron due to potential for GI bleeding and difficulty in knowing if black stools are from iron or bleeding.      Labs : 2025  RX/TX plans : TBD    Recent BREANNA use, transfusion, IV iron: Venofer and PRBCs  Hx of CAD, NTEMI (), HTN, Pacemaker, AFib, Anticoagulated.    Contact: Consent to Communicate scanned on 2019.         Latest Ref Rng & Units 2024   Anemia   Hemoglobin 11.7 - 15.7 g/dL 8.1  8.5  9.7  9.5  8.0  8.2     6.6     6.8  9.2     8.9     8.9    TSAT 15 - 46 %     26  9  24    Ferritin 11 - 328 ng/mL     118  93  79    PRBCs       2 units        Multiple values from one day are sorted in reverse-chronological order     BP Readings from Last 3 Encounters:   24 104/57   24 134/61   24 121/66     Wt Readings from Last 2 Encounters:   24 66.2 kg (145 lb 14.4 oz)   24 65.9 kg (145 lb 3.2 oz)     Current Outpatient Medications   Medication Sig Dispense Refill    acetaminophen (TYLENOL) 325 MG tablet Take 975 mg by mouth nightly as needed for pain      allopurinol (ZYLOPRIM) 100 MG tablet Take 1 tablet (100 mg) by mouth daily 90 tablet 0    amLODIPine (NORVASC) 10 MG tablet Take 1 tablet (10 mg) by mouth daily 90 tablet 3    apixaban ANTICOAGULANT (ELIQUIS) 2.5 MG tablet Take 1 tablet (2.5 mg) by mouth 2 times daily 180 tablet 3    budesonide (PULMICORT) 0.5 MG/2ML neb solution Take 2 mLs (0.5 mg) by nebulization 2 times daily 2 mL 3    clopidogrel (PLAVIX)  75 MG tablet Take 1 tablet (75 mg) by mouth daily 90 tablet 1    ferrous sulfate (FEROSUL) 325 (65 Fe) MG tablet Take 1 tablet (325 mg) by mouth daily (with breakfast) 30 tablet 0    fluticasone-salmeterol (ADVAIR) 250-50 MCG/ACT inhaler INHALE 1 DOSE BY MOUTH TWICE DAILY 60 each 8    furosemide (LASIX) 40 MG tablet Take 1 tablet (40 mg) by mouth daily May take an additional 20 mg at noon as needed for swelling. 90 tablet 2    isosorbide mononitrate (ISMO/MONOKET) 20 MG tablet Take 3 tablets (60 mg) by mouth 2 times daily 180 tablet 3    methocarbamol (ROBAXIN) 500 MG tablet Take 1 tablet (500 mg) by mouth every 6 hours as needed for muscle spasms 30 tablet 0    metoprolol tartrate (LOPRESSOR) 25 MG tablet Take 2 tablets (50 mg) by mouth 2 times daily      Multiple Vitamins-Minerals (PRESERVISION AREDS 2+MULTI VIT PO) Take 1 capsule by mouth 2 times daily      omeprazole (PRILOSEC) 40 MG DR capsule Take 1 capsule (40 mg) by mouth daily 90 capsule 3    potassium chloride ER (K-TAB) 20 MEQ CR tablet Take 1 tablet (20 mEq) by mouth daily 90 tablet 3    rosuvastatin (CRESTOR) 20 MG tablet Take 1 tablet (20 mg) by mouth daily for 360 days 90 tablet 3    sodium bicarbonate 650 MG tablet Take 1 tablet (650 mg) by mouth 2 times daily 60 tablet 11    timolol maleate (TIMOPTIC) 0.5 % ophthalmic solution INSTILL 1 DROP INTO EACH EYE ONCE DAILY IN THE MORNING      vitamin B-12 (CYANOCOBALAMIN) 500 MCG tablet Take 1 tablet by mouth daily      vitamin D3 25 mcg (1000 units) tablet Take 1 tablet (25 mcg) by mouth every other day 90 tablet 3       ASSESSMENT:  Hgb Not at goal/Initiation of therapy   Ferritin: Not at goal of (>100ng/mL)  TSat: not at goal of >30%  Iron regimen recommended: Treat with IV Iron.   Recommended BREANNA regimen: TBD; treat with IV Iron per clinic note on 6/24/24.   Blood Pressure: Stable        PLAN:  1. Patient called today for enrollment in Anemia Management Service.  2. Need to discuss: anemia overview,  monitoring service, and goal hemoglobin range and rationale and risks of BREANNA blood clots, stroke, and increase in blood pressure  3. Dose location: in clinic; [place]  4. Labs: [place]  5. Pharmacy: [facility]      New Anemia referral.  Treat with IV Iron; Oral Iron stopped.   *Note form 6/24/24; Recommended to stop oral iron and start IV iron due to potential for GI bleeding and difficulty in knowing if black stools are from iron or bleeding.    [employer] for [employer]. Orders for Injectafer 750mg x 2 entered.   Spoke with Tessa. She was not at home and will call back later today.       Was able to speak with Tessa.  She prefers to go to the [facility] infusion center.   I did offer her [facility] but they require 3 infusions.  So she would prefer [facility] and [facility].  Message sent to scheduling at [facility].     Next call date:  7/3/24  Did she get scheduled for Iron?     Barbara Dimas RN   Anemia Services  [facility]  [email or web address]   Office : [phone]  Fax: [phone]

## 2024-06-26 ENCOUNTER — TELEPHONE (OUTPATIENT)
Dept: NEPHROLOGY | Facility: CLINIC | Age: 89
End: 2024-06-26
Payer: COMMERCIAL

## 2024-06-26 ENCOUNTER — DOCUMENTATION ONLY (OUTPATIENT)
Dept: FAMILY MEDICINE | Facility: CLINIC | Age: 89
End: 2024-06-26
Payer: COMMERCIAL

## 2024-06-26 DIAGNOSIS — I25.118 CORONARY ARTERY DISEASE OF NATIVE ARTERY OF NATIVE HEART WITH STABLE ANGINA PECTORIS (H): ICD-10-CM

## 2024-06-26 NOTE — PROGRESS NOTES
Type of Form Received: [facility] Order [id number]    Form Received (Date) 6/26/24   Form Filled out Yes, faxed 7/2   Placed in provider folder Yes

## 2024-06-26 NOTE — TELEPHONE ENCOUNTER
[facility] Call Center    Phone Message    May a detailed message be left on voicemail: yes    Reason for Call: Medication Refill Request    Has the patient contacted the pharmacy for the refill? Yes   Name of medication being requested:    metoprolol tartrate (LOPRESSOR) 25 MG tablet     Provider who prescribed the medication: Mario Alberto  Pharmacy: [facility] [address] [place] [address] [facility] [address]   Date medication is needed: asap       Action Taken: Message routed to:  Other: neph    Travel Screening: Not Applicable     Date of Service:

## 2024-06-26 NOTE — TELEPHONE ENCOUNTER
Metoprolol 50 mg BID was prescribed and sent to patient's pharmacy by Mario Alberto DEAN on 6/24. Refill denied.  CAROL Graham Care Coordinator  Nephrology

## 2024-06-27 ENCOUNTER — TELEPHONE (OUTPATIENT)
Dept: NEPHROLOGY | Facility: CLINIC | Age: 89
End: 2024-06-27
Payer: COMMERCIAL

## 2024-06-27 ENCOUNTER — PATIENT OUTREACH (OUTPATIENT)
Dept: CARE COORDINATION | Facility: CLINIC | Age: 89
End: 2024-06-27
Payer: COMMERCIAL

## 2024-06-27 DIAGNOSIS — I25.118 CORONARY ARTERY DISEASE OF NATIVE ARTERY OF NATIVE HEART WITH STABLE ANGINA PECTORIS (H): ICD-10-CM

## 2024-06-27 RX ORDER — METOPROLOL TARTRATE 25 MG/1
50 TABLET, FILM COATED ORAL 2 TIMES DAILY
Qty: 180 TABLET | Refills: 3 | Status: SHIPPED | OUTPATIENT
Start: 2024-06-27

## 2024-06-27 NOTE — TELEPHONE ENCOUNTER
Nephrology Note: Medication Refill Request    Medication Refill Request:     Medication/Dose/Frequency:   metoprolol tartrate (LOPRESSOR) 25 MG tablet 180 tablet 3 6/27/2024 -- No   Sig - Route: Take 2 tablets (50 mg) by mouth 2 times daily - Oral     Preferred Pharmacy:   [facility] [address] [place], [place] [address] [facility] [address] [facility]     Provider:  Mario Alberto DEAN                         SITUATION/BACKROUND:                 Last office visit: 6/24/24        Future office visit: 10/28/24     ASSESSMENT:     Neph Assessments:    Recent Labs:  CBC Results:  Recent Labs   Lab Test 06/20/24  0659   WBC 5.6   RBC 2.96*   HGB 9.2*   HCT 28.0*   MCV 95   MCH 31.1   MCHC 32.9   RDW 19.5*   PLT 76*     Last Renal Panel:  Sodium   Date Value Ref Range Status   06/20/2024 143 135 - 145 mmol/L Final     Comment:     Reference intervals for this test were updated on 09/26/2023 to more accurately reflect our healthy population. There may be differences in the flagging of prior results with similar values performed with this method. Interpretation of those prior results can be made in the context of the updated reference intervals.    08/26/2020 141 133 - 144 mmol/L Final     Potassium   Date Value Ref Range Status   06/20/2024 3.5 3.4 - 5.3 mmol/L Final   11/21/2022 4.8 3.4 - 5.3 mmol/L Final   08/26/2020 4.4 3.4 - 5.3 mmol/L Final     Potassium POCT   Date Value Ref Range Status   04/13/2024 4.0 3.4 - 5.3 mmol/L Final     Chloride   Date Value Ref Range Status   06/20/2024 113 (H) 98 - 107 mmol/L Final   11/21/2022 110 (H) 94 - 109 mmol/L Final   08/26/2020 111 (H) 94 - 109 mmol/L Final     Carbon Dioxide   Date Value Ref Range Status   08/26/2020 24 20 - 32 mmol/L Final     Carbon Dioxide (CO2)   Date Value Ref Range Status   06/20/2024 17 (L) 22 - 29 mmol/L Final   11/21/2022 24 20 - 32 mmol/L Final     Anion Gap   Date Value Ref Range Status   06/20/2024 13 7 - 15 mmol/L Final   11/21/2022 6 3 - 14 mmol/L Final   08/26/2020 8 3  - 14 mmol/L Final     Glucose   Date Value Ref Range Status   06/20/2024 82 70 - 99 mg/dL Final   11/21/2022 79 70 - 99 mg/dL Final   08/26/2020 84 70 - 99 mg/dL Final     GLUCOSE BY METER POCT   Date Value Ref Range Status   04/21/2024 143 (H) 70 - 99 mg/dL Final     Urea Nitrogen   Date Value Ref Range Status   06/20/2024 37.7 (H) 8.0 - 23.0 mg/dL Final   11/21/2022 35 (H) 7 - 30 mg/dL Final   08/26/2020 41 (H) 7 - 30 mg/dL Final     Creatinine   Date Value Ref Range Status   06/20/2024 1.84 (H) 0.51 - 0.95 mg/dL Final   08/26/2020 1.70 (H) 0.52 - 1.04 mg/dL Final     GFR Estimate   Date Value Ref Range Status   06/20/2024 26 (L) >60 mL/min/1.73m2 Final     Comment:     eGFR calculated using 2021 CKD-EPI equation.   08/26/2020 27 (L) >60 mL/min/[1.73_m2] Final     Comment:     Non  GFR Calc  Starting 12/18/2018, serum creatinine based estimated GFR (eGFR) will be   calculated using the Chronic Kidney Disease Epidemiology Collaboration   (CKD-EPI) equation.       Calcium   Date Value Ref Range Status   06/20/2024 8.9 8.8 - 10.2 mg/dL Final   08/26/2020 9.0 8.5 - 10.1 mg/dL Final     Phosphorus   Date Value Ref Range Status   06/12/2024 4.1 2.5 - 4.5 mg/dL Final   08/26/2020 3.6 2.5 - 4.5 mg/dL Final     Albumin   Date Value Ref Range Status   06/19/2024 4.0 3.5 - 5.2 g/dL Final   11/21/2022 3.8 3.4 - 5.0 g/dL Final   08/26/2020 3.6 3.4 - 5.0 g/dL Final       Current Medication List:   Current Outpatient Medications (Antihypertensive, Cardiovascular, Diuretics, Beta blockers, Calcium blockers, Anticoagulants)   Medication Sig    metoprolol tartrate (LOPRESSOR) 25 MG tablet Take 2 tablets (50 mg) by mouth 2 times daily    amLODIPine (NORVASC) 10 MG tablet Take 1 tablet (10 mg) by mouth daily    apixaban ANTICOAGULANT (ELIQUIS) 2.5 MG tablet Take 1 tablet (2.5 mg) by mouth 2 times daily    furosemide (LASIX) 40 MG tablet Take 1 tablet (40 mg) by mouth daily May take an additional 20 mg at noon as  needed for swelling.     Current Outpatient Medications (Other)   Medication Sig    acetaminophen (TYLENOL) 325 MG tablet Take 975 mg by mouth nightly as needed for pain    allopurinol (ZYLOPRIM) 100 MG tablet Take 1 tablet (100 mg) by mouth daily    budesonide (PULMICORT) 0.5 MG/2ML neb solution Take 2 mLs (0.5 mg) by nebulization 2 times daily    clopidogrel (PLAVIX) 75 MG tablet Take 1 tablet (75 mg) by mouth daily    ferrous sulfate (FEROSUL) 325 (65 Fe) MG tablet Take 1 tablet (325 mg) by mouth daily (with breakfast)    fluticasone-salmeterol (ADVAIR) 250-50 MCG/ACT inhaler INHALE 1 DOSE BY MOUTH TWICE DAILY    isosorbide mononitrate (ISMO/MONOKET) 20 MG tablet Take 3 tablets (60 mg) by mouth 2 times daily    methocarbamol (ROBAXIN) 500 MG tablet Take 1 tablet (500 mg) by mouth every 6 hours as needed for muscle spasms    Multiple Vitamins-Minerals (PRESERVISION AREDS 2+MULTI VIT PO) Take 1 capsule by mouth 2 times daily    omeprazole (PRILOSEC) 40 MG DR capsule Take 1 capsule (40 mg) by mouth daily    potassium chloride ER (K-TAB) 20 MEQ CR tablet Take 1 tablet (20 mEq) by mouth daily    rosuvastatin (CRESTOR) 20 MG tablet Take 1 tablet (20 mg) by mouth daily for 360 days    sodium bicarbonate 650 MG tablet Take 1 tablet (650 mg) by mouth 2 times daily    timolol maleate (TIMOPTIC) 0.5 % ophthalmic solution INSTILL 1 DROP INTO EACH EYE ONCE DAILY IN THE MORNING    vitamin B-12 (CYANOCOBALAMIN) 500 MCG tablet Take 1 tablet by mouth daily    vitamin D3 25 mcg (1000 units) tablet Take 1 tablet (25 mcg) by mouth every other day       Has patient had kidney transplant in the prior 1 year: no.   Has patient been seen the last 12 months: Yes.  Associated labs reviewed for medication: Yes    PLAN:     Medication refilled per protocol: Yes    CINTHIA MUNGUIA RN

## 2024-06-27 NOTE — PROGRESS NOTES
"Clinic Care Coordination Contact    Situation: Patient chart reviewed by care coordinator.    Background: Referred by PCP for \"Doing well considering many health issues, lots of family support, might need move [place] \"on 6/12/24. Pt was since hospitalized in ED- pt has had visit after with Nephrology with no need for further TCM call.     Assessment: Pt was contacted by SW on 6/14 in which pt declined assistance with moving to [place] facility and is working with someone on this. RN called patient regarding nursing care coordination related to other health concerns, and pt stated that she has nursing staff that come in 1x per week and she feels well supported with this and feels that she needs no additional support.     Plan/Recommendations: RN closed CC program due to pt declining. Pt was encouraged to call primary care clinic if needing further support or looking for CC in the future. RN reviewed pt's upcoming appts with Dr. PARISI with patient.     LUPE CALLE RN on 6/27/2024 at 9:54 AM                   "

## 2024-06-27 NOTE — TELEPHONE ENCOUNTER
Health Call Center    Phone Message    May a detailed message be left on voicemail: yes     Reason for Call: Medication Refill Request    Has the patient contacted the pharmacy for the refill? no  Name of medication being requested: metoprolol tartrate (LOPRESSOR)  Provider who prescribed the medication: marlin  [facility] [address] [place] [place] [facility] [address] [facility]     Date medication is needed: by Friday    Pt was recently given increase in this medication but will now be out of current meds on Sunday. Please send a refill for the 50 mg amount asap. Also, wants to confirm she is taking the correct amount. Please call pt. (Before she was taking 2 tables of 25 mg, 2 times a day, now her instructions are saying to take 2 tables of 50 mg, 2 times a day - please review as she is confused) Thank you!    Action Taken: Message routed to:  [facility]    Travel Screening: Not Applicable     Date of Service:

## 2024-06-28 ENCOUNTER — TELEPHONE (OUTPATIENT)
Dept: FAMILY MEDICINE | Facility: CLINIC | Age: 89
End: 2024-06-28

## 2024-06-28 ENCOUNTER — HOSPITAL ENCOUNTER (EMERGENCY)
Facility: CLINIC | Age: 89
Discharge: HOME | End: 2024-06-28
Attending: STUDENT IN AN ORGANIZED HEALTH CARE EDUCATION/TRAINING PROGRAM | Admitting: STUDENT IN AN ORGANIZED HEALTH CARE EDUCATION/TRAINING PROGRAM
Payer: COMMERCIAL

## 2024-06-28 ENCOUNTER — TELEPHONE (OUTPATIENT)
Dept: INTERNAL MEDICINE | Facility: CLINIC | Age: 89
End: 2024-06-28

## 2024-06-28 ENCOUNTER — APPOINTMENT (OUTPATIENT)
Dept: GENERAL RADIOLOGY | Facility: CLINIC | Age: 89
End: 2024-06-28
Attending: STUDENT IN AN ORGANIZED HEALTH CARE EDUCATION/TRAINING PROGRAM
Payer: COMMERCIAL

## 2024-06-28 ENCOUNTER — LAB (OUTPATIENT)
Dept: LAB | Facility: CLINIC | Age: 89
End: 2024-06-28
Payer: COMMERCIAL

## 2024-06-28 VITALS
HEIGHT: 65 IN | RESPIRATION RATE: 18 BRPM | BODY MASS INDEX: 24.16 KG/M2 | SYSTOLIC BLOOD PRESSURE: 138 MMHG | OXYGEN SATURATION: 100 % | HEART RATE: 77 BPM | WEIGHT: 145 LBS | DIASTOLIC BLOOD PRESSURE: 51 MMHG | TEMPERATURE: 97.6 F

## 2024-06-28 DIAGNOSIS — N18.4 ANEMIA OF CHRONIC RENAL FAILURE, STAGE 4 (SEVERE) (H): ICD-10-CM

## 2024-06-28 DIAGNOSIS — Z79.01 ANTICOAGULATED: ICD-10-CM

## 2024-06-28 DIAGNOSIS — D64.9 ACUTE ON CHRONIC ANEMIA: ICD-10-CM

## 2024-06-28 DIAGNOSIS — D63.1 ANEMIA OF CHRONIC RENAL FAILURE, STAGE 4 (SEVERE) (H): ICD-10-CM

## 2024-06-28 DIAGNOSIS — Q27.30 AVM (ARTERIOVENOUS MALFORMATION): ICD-10-CM

## 2024-06-28 DIAGNOSIS — R06.09 DYSPNEA ON EXERTION: ICD-10-CM

## 2024-06-28 LAB
ABO/RH(D): NORMAL
ALBUMIN SERPL BCG-MCNC: 3.7 G/DL (ref 3.5–5.2)
ALP SERPL-CCNC: 58 U/L (ref 40–150)
ALT SERPL W P-5'-P-CCNC: 9 U/L (ref 0–50)
ANION GAP SERPL CALCULATED.3IONS-SCNC: 13 MMOL/L (ref 7–15)
ANTIBODY SCREEN: NEGATIVE
AST SERPL W P-5'-P-CCNC: 27 U/L (ref 0–45)
BASOPHILS # BLD AUTO: ABNORMAL 10*3/UL
BASOPHILS # BLD MANUAL: 0.2 10E3/UL (ref 0–0.2)
BASOPHILS NFR BLD AUTO: ABNORMAL %
BASOPHILS NFR BLD MANUAL: 3 %
BILIRUB SERPL-MCNC: 0.3 MG/DL
BLD PROD TYP BPU: NORMAL
BLOOD COMPONENT TYPE: NORMAL
BUN SERPL-MCNC: 85 MG/DL (ref 8–23)
CALCIUM SERPL-MCNC: 8.7 MG/DL (ref 8.8–10.2)
CHLORIDE SERPL-SCNC: 110 MMOL/L (ref 98–107)
CODING SYSTEM: NORMAL
CREAT SERPL-MCNC: 1.88 MG/DL (ref 0.51–0.95)
CROSSMATCH: NORMAL
DEPRECATED HCO3 PLAS-SCNC: 17 MMOL/L (ref 22–29)
EGFRCR SERPLBLD CKD-EPI 2021: 25 ML/MIN/1.73M2
EOSINOPHIL # BLD AUTO: ABNORMAL 10*3/UL
EOSINOPHIL # BLD MANUAL: 0.3 10E3/UL (ref 0–0.7)
EOSINOPHIL NFR BLD AUTO: ABNORMAL %
EOSINOPHIL NFR BLD MANUAL: 4 %
ERYTHROCYTE [DISTWIDTH] IN BLOOD BY AUTOMATED COUNT: 16.6 % (ref 10–15)
GLUCOSE SERPL-MCNC: 105 MG/DL (ref 70–99)
HCT VFR BLD AUTO: 25 % (ref 35–47)
HCT VFR BLD AUTO: 25.1 % (ref 35–47)
HCT VFR BLD AUTO: 26 % (ref 35–47)
HGB BLD-MCNC: 7.5 G/DL (ref 11.7–15.7)
HGB BLD-MCNC: 7.9 G/DL (ref 11.7–15.7)
HGB BLD-MCNC: 8.2 G/DL (ref 11.7–15.7)
IMM GRANULOCYTES # BLD: ABNORMAL 10*3/UL
IMM GRANULOCYTES NFR BLD: ABNORMAL %
INR PPP: 1.49 (ref 0.85–1.15)
ISSUE DATE AND TIME: NORMAL
LYMPHOCYTES # BLD AUTO: ABNORMAL 10*3/UL
LYMPHOCYTES # BLD MANUAL: 1.7 10E3/UL (ref 0.8–5.3)
LYMPHOCYTES NFR BLD AUTO: ABNORMAL %
LYMPHOCYTES NFR BLD MANUAL: 27 %
MCH RBC QN AUTO: 30.5 PG (ref 26.5–33)
MCHC RBC AUTO-ENTMCNC: 30 G/DL (ref 31.5–36.5)
MCV RBC AUTO: 102 FL (ref 78–100)
MONOCYTES # BLD AUTO: ABNORMAL 10*3/UL
MONOCYTES # BLD MANUAL: 0.5 10E3/UL (ref 0–1.3)
MONOCYTES NFR BLD AUTO: ABNORMAL %
MONOCYTES NFR BLD MANUAL: 8 %
NEUTROPHILS # BLD AUTO: ABNORMAL 10*3/UL
NEUTROPHILS # BLD MANUAL: 3.7 10E3/UL (ref 1.6–8.3)
NEUTROPHILS NFR BLD AUTO: ABNORMAL %
NEUTROPHILS NFR BLD MANUAL: 58 %
NRBC # BLD AUTO: 0 10E3/UL
NRBC BLD AUTO-RTO: 0 /100
PLAT MORPH BLD: NORMAL
PLATELET # BLD AUTO: 65 10E3/UL (ref 150–450)
POTASSIUM SERPL-SCNC: 4.3 MMOL/L (ref 3.4–5.3)
PROT SERPL-MCNC: 6.1 G/DL (ref 6.4–8.3)
RBC # BLD AUTO: 2.46 10E6/UL (ref 3.8–5.2)
RBC MORPH BLD: NORMAL
SODIUM SERPL-SCNC: 140 MMOL/L (ref 135–145)
SPECIMEN EXPIRATION DATE: NORMAL
UNIT ABO/RH: NORMAL
UNIT NUMBER: NORMAL
UNIT STATUS: NORMAL
UNIT TYPE ISBT: 9500
WBC # BLD AUTO: 6.4 10E3/UL (ref 4–11)

## 2024-06-28 PROCEDURE — 71046 X-RAY EXAM CHEST 2 VIEWS: CPT

## 2024-06-28 PROCEDURE — 96361 HYDRATE IV INFUSION ADD-ON: CPT | Performed by: STUDENT IN AN ORGANIZED HEALTH CARE EDUCATION/TRAINING PROGRAM

## 2024-06-28 PROCEDURE — 258N000003 HC RX IP 258 OP 636: Performed by: STUDENT IN AN ORGANIZED HEALTH CARE EDUCATION/TRAINING PROGRAM

## 2024-06-28 PROCEDURE — 250N000011 HC RX IP 250 OP 636: Performed by: STUDENT IN AN ORGANIZED HEALTH CARE EDUCATION/TRAINING PROGRAM

## 2024-06-28 PROCEDURE — 85027 COMPLETE CBC AUTOMATED: CPT

## 2024-06-28 PROCEDURE — 36415 COLL VENOUS BLD VENIPUNCTURE: CPT | Performed by: STUDENT IN AN ORGANIZED HEALTH CARE EDUCATION/TRAINING PROGRAM

## 2024-06-28 PROCEDURE — P9016 RBC LEUKOCYTES REDUCED: HCPCS | Performed by: STUDENT IN AN ORGANIZED HEALTH CARE EDUCATION/TRAINING PROGRAM

## 2024-06-28 PROCEDURE — 96374 THER/PROPH/DIAG INJ IV PUSH: CPT | Performed by: STUDENT IN AN ORGANIZED HEALTH CARE EDUCATION/TRAINING PROGRAM

## 2024-06-28 PROCEDURE — 71046 X-RAY EXAM CHEST 2 VIEWS: CPT | Mod: 26 | Performed by: RADIOLOGY

## 2024-06-28 PROCEDURE — 86923 COMPATIBILITY TEST ELECTRIC: CPT | Performed by: STUDENT IN AN ORGANIZED HEALTH CARE EDUCATION/TRAINING PROGRAM

## 2024-06-28 PROCEDURE — 85007 BL SMEAR W/DIFF WBC COUNT: CPT | Performed by: STUDENT IN AN ORGANIZED HEALTH CARE EDUCATION/TRAINING PROGRAM

## 2024-06-28 PROCEDURE — 85018 HEMOGLOBIN: CPT | Performed by: STUDENT IN AN ORGANIZED HEALTH CARE EDUCATION/TRAINING PROGRAM

## 2024-06-28 PROCEDURE — 93005 ELECTROCARDIOGRAM TRACING: CPT | Performed by: STUDENT IN AN ORGANIZED HEALTH CARE EDUCATION/TRAINING PROGRAM

## 2024-06-28 PROCEDURE — 36430 TRANSFUSION BLD/BLD COMPNT: CPT | Performed by: STUDENT IN AN ORGANIZED HEALTH CARE EDUCATION/TRAINING PROGRAM

## 2024-06-28 PROCEDURE — 36415 COLL VENOUS BLD VENIPUNCTURE: CPT

## 2024-06-28 PROCEDURE — 86900 BLOOD TYPING SEROLOGIC ABO: CPT | Performed by: STUDENT IN AN ORGANIZED HEALTH CARE EDUCATION/TRAINING PROGRAM

## 2024-06-28 PROCEDURE — C9113 INJ PANTOPRAZOLE SODIUM, VIA: HCPCS | Performed by: STUDENT IN AN ORGANIZED HEALTH CARE EDUCATION/TRAINING PROGRAM

## 2024-06-28 PROCEDURE — 80053 COMPREHEN METABOLIC PANEL: CPT | Performed by: STUDENT IN AN ORGANIZED HEALTH CARE EDUCATION/TRAINING PROGRAM

## 2024-06-28 PROCEDURE — 85014 HEMATOCRIT: CPT

## 2024-06-28 PROCEDURE — 85610 PROTHROMBIN TIME: CPT | Performed by: STUDENT IN AN ORGANIZED HEALTH CARE EDUCATION/TRAINING PROGRAM

## 2024-06-28 PROCEDURE — 99291 CRITICAL CARE FIRST HOUR: CPT | Performed by: STUDENT IN AN ORGANIZED HEALTH CARE EDUCATION/TRAINING PROGRAM

## 2024-06-28 PROCEDURE — 99291 CRITICAL CARE FIRST HOUR: CPT | Mod: 25 | Performed by: STUDENT IN AN ORGANIZED HEALTH CARE EDUCATION/TRAINING PROGRAM

## 2024-06-28 RX ADMIN — PANTOPRAZOLE SODIUM 40 MG: 40 INJECTION, POWDER, FOR SOLUTION INTRAVENOUS at 18:58

## 2024-06-28 RX ADMIN — SODIUM CHLORIDE, POTASSIUM CHLORIDE, SODIUM LACTATE AND CALCIUM CHLORIDE 500 ML: 600; 310; 30; 20 INJECTION, SOLUTION INTRAVENOUS at 18:59

## 2024-06-28 ASSESSMENT — COLUMBIA-SUICIDE SEVERITY RATING SCALE - C-SSRS
2. HAVE YOU ACTUALLY HAD ANY THOUGHTS OF KILLING YOURSELF IN THE PAST MONTH?: NO
1. IN THE PAST MONTH, HAVE YOU WISHED YOU WERE DEAD OR WISHED YOU COULD GO TO SLEEP AND NOT WAKE UP?: NO
6. HAVE YOU EVER DONE ANYTHING, STARTED TO DO ANYTHING, OR PREPARED TO DO ANYTHING TO END YOUR LIFE?: NO

## 2024-06-28 ASSESSMENT — ACTIVITIES OF DAILY LIVING (ADL)
ADLS_ACUITY_SCORE: 40

## 2024-06-28 NOTE — TELEPHONE ENCOUNTER
Routing message to provider.    Lab at [facility] has a critical lab value for your patient.    They have been unable to get a hold of you to relay values.    Please return call to lab at [phone] so they can release results and run any other labs you would like ordered.    Hgb is 7.8  and 7.9    Christine M Klisch, RN

## 2024-06-28 NOTE — ED PROVIDER NOTES
[facility] ([facility])    6/28/24       ED PROVIDER NOTE       History     Chief Complaint   Patient presents with    Fatigue    Shortness of Breath     HPI  Tessa Mansfield is a 89 year old female with PMH notable for CAD s/p multiple PCIs (on Plavix), SSS s/p dual chamber pacemaker, paroxymal atrial fibrillation (on Eliquis), CKD, and HTN who presents to the ED with fatigue and shortness of breath.     Patient had labs drawn this morning and was found to have a low hemoglobin of 7.9 and states this has been an ongoing issue for her.     Per Chart Review,  Reviewed admission H&P and discharge note for hospitalization from 04/13-04/22/24 for small bowel obstruction s/p exploratory laparotomy x3. She was readmitted from 5/01-5/7/24 for melena with hemoglobin of 8.1 at time of admission. She received 1 unit pRBC. EGD on 5/01/24 showed patchy nodular mucosa in body of stomach which had mild oozing with irritation but unlikely to cause melena. 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 She received PPI IV and was transitioned to PO PPI. HGB at discharge was 8.5.    Admitted again from 6/19 - 6/20 in the setting of low hemoglobin at which time at the time of discharge the recommendation from GI: Would recommend avoiding future admissions for acute anemia in absence of outward symptoms of bleed which may require additional intervention or potential endoscopy.     Past Medical History  Past Medical History:   Diagnosis Date    CAD (coronary artery disease)     CAD s/p PCI+BMS to MRCA 10/2002, PCI to mLCX 2/2003, PCI+DESx2 to pLAD 11/2007, PCI+DESx1 to dRCA 12/2007, PCI+ALVAREZ to RPDA 7/2013; on indefinite DAPT  10/27/2002    10/27/2002: AMI s/p PCI+BMS (3.5x18mm Bx velocity) to mRCA 2/5/2003: Back pain; PCI+stent to mLCX c/b distal embolization/slow flow 4/11/2003: Unstable angina; PCI+stent (Hepacoat velocity) to mLAD 11/27/2007: PCI+DESx2 to  pLAD (IVUS w/ calcification of LMCA and ulcerated plaque pLAD, 80% dRCA; also had 80% dLCX, too small to stent) 12/11/2007: Staged PCI. PCI+DESx1 (3.5x13mm Cypher) to dRCA (indefinite DAPT recommended at this time) 6/27/2008: Angina. mLCX stent 70% ISR. LAD and RCA stents patent. Myocardium at risk from LCX felt to be small, medical management preferred to PCI. 11/10/2009: Angina. Findings unchanged from 6/27/2008, FFR LAD 0.90. Medical management recommended. 7/23/2013: Unstable angina; PCI+ALVAREZ to mPDA. Diagnostic findings: LMCA 40% distal. LAD: pLAD stents patent mLAD 30% ISR dLAD 50% diffuse, D2 diffuse disease. LCX 80% mid ISR. RCA diffuse <30% mid, RPLAs diffuse disease, RPDA 100% occlusion.      Cardiac Pacemaker- [employer], dual chamber- NOT dependant 11/28/2007    CKD (chronic kidney disease), stage IV (H)     Hyperlipidemia LDL goal <70     Hypertension     Stented coronary artery 10-    RCA    Stented coronary artery 2-5-2003    LCx    Stented coronary artery 4-    LAD    Stented coronary artery 11-    LAD    Stented coronary artery 12-    RCA    Transient complete heart block (H) 11/28/2007     Past Surgical History:   Procedure Laterality Date    CHOLECYSTECTOMY      CV CORONARY ANGIOGRAM N/A 6/17/2022    Procedure: Coronary Angiogram;  Surgeon: Constantine Macias MD;  Location: [facility] CARDIAC CATH LAB    CV CORONARY ANGIOGRAM N/A 7/17/2023    Procedure: Coronary Angiogram;  Surgeon: Constantine Macias MD;  Location: [facility] CARDIAC CATH LAB    CV PCI N/A 6/17/2022    Procedure: Percutaneous Coronary Intervention;  Surgeon: Constantine Macias MD;  Location: [facility] CARDIAC CATH LAB    CV PCI N/A 7/17/2023    Procedure: Percutaneous Coronary Intervention;  Surgeon: Constantine Macias MD;  Location: [facility] CARDIAC CATH LAB    CV PCI N/A 11/6/2023    Procedure: Percutaneous Coronary Intervention;  Surgeon: Constantine Macias MD;  Location: [facility]  CARDIAC CATH LAB    CV RIGHT HEART CATH MEASUREMENTS RECORDED N/A 6/17/2022    Procedure: Right Heart Catheterization;  Surgeon: Constantine Macias MD;  Location:  HEART CARDIAC CATH LAB    EP PACEMAKER N/A 10/15/2019    Procedure: EP PACEMAKER;  Surgeon: Anthony Zhu MD;  Location:  HEART CARDIAC CATH LAB    ESOPHAGOSCOPY, GASTROSCOPY, DUODENOSCOPY (EGD), COMBINED N/A 7/20/2023    Procedure: Esophagoscopy, gastroscopy, duodenoscopy (EGD), combined;  Surgeon: Bekah Dash DO;  [facility]    ESOPHAGOSCOPY, GASTROSCOPY, DUODENOSCOPY (EGD), COMBINED N/A 5/2/2024    Procedure: Esophagoscopy, gastroscopy, duodenoscopy (EGD), combined;  Surgeon: Tavo Donaldson MD;  [facility]    HC PPM INSERTION NEW/REPLACEMENT W/ ATRIAL&VENTRICULAR LEAD  11-    HYSTERECTOMY      LAPAROTOMY EXPLORATORY N/A 4/13/2024    Procedure: Laparotomy exploratory, Wound Vac Placement.;  Surgeon: Anthony Trammell MD;  [facility]    LAPAROTOMY EXPLORATORY N/A 4/14/2024    Procedure: Abdominal Re-Exploration and Closure;  Surgeon: Anthony Trammell MD;  [facility]    LAPAROTOMY EXPLORATORY N/A 4/13/2024    Procedure: Exploratory Laparotomy;  Surgeon: Anthony Trammell MD;  [facility]     acetaminophen (TYLENOL) 325 MG tablet  allopurinol (ZYLOPRIM) 100 MG tablet  amLODIPine (NORVASC) 10 MG tablet  apixaban ANTICOAGULANT (ELIQUIS) 2.5 MG tablet  budesonide (PULMICORT) 0.5 MG/2ML neb solution  clopidogrel (PLAVIX) 75 MG tablet  ferrous sulfate (FEROSUL) 325 (65 Fe) MG tablet  fluticasone-salmeterol (ADVAIR) 250-50 MCG/ACT inhaler  furosemide (LASIX) 40 MG tablet  isosorbide mononitrate (ISMO/MONOKET) 20 MG tablet  methocarbamol (ROBAXIN) 500 MG tablet  metoprolol tartrate (LOPRESSOR) 25 MG tablet  Multiple Vitamins-Minerals (PRESERVISION AREDS 2+MULTI VIT PO)  omeprazole (PRILOSEC) 40 MG DR capsule  potassium chloride ER (K-TAB) 20 MEQ CR tablet  rosuvastatin (CRESTOR) 20 MG  "tablet  sodium bicarbonate 650 MG tablet  timolol maleate (TIMOPTIC) 0.5 % ophthalmic solution  vitamin B-12 (CYANOCOBALAMIN) 500 MCG tablet  vitamin D3 25 mcg (1000 units) tablet      Allergies   Allergen Reactions    Codeine Sulfate Itching    Shrimp Swelling    Bactrim [Sulfamethoxazole W/Trimethoprim]      Patient unable to recall    Biaxin [Clarithromycin]     Chlorthalidone Nausea and Vomiting    Clonidine     Darvon [Propoxyphene Hcl]     Dilaudid [Hydromorphone] Visual Disturbance and Hallucination     Tolerated in April 2024 hospital admissions    Gabapentin Fatigue and Confusion     \"felt drunk\"    Indomethacin     Levaquin [Levofloxacin Hemihydrate]     Morphine Sulfate     Percocet [Oxycodone-Acetaminophen] Hallucination    Pregabalin Itching and Fatigue    Simvastatin Palpitations     Muscle weakness, leg cramping      Spironolactone      Dehydrated      Terazosin      Family History  Family History   Problem Relation Age of Onset    Cancer Sister 82        bladder cancer     Social History   Social History     Tobacco Use    Smoking status: Former     Current packs/day: 0.30     Average packs/day: 0.3 packs/day for 15.0 years (4.5 ttl pk-yrs)     Types: Cigarettes    Smokeless tobacco: Never    Tobacco comments:     Quit 35+ years ago   Vaping Use    Vaping status: Never Used   Substance Use Topics    Drug use: No      Past medical history, past surgical history, medications, allergies, family history, and social history were reviewed with the patient. No additional pertinent items.   A complete review of systems was performed with pertinent positives and negatives noted in the HPI, and all other systems negative.    Physical Exam   BP: 94/56  Pulse: 61  Temp: 97.7  F (36.5  C)  Resp: 16  Height: 165.1 cm (5' 5\")  Weight: 65.8 kg (145 lb)  SpO2: 100 %  Physical Exam  GEN: Well appearing, non toxic, cooperative, pale appearance  HEENT: normocephalic and atraumatic, PERRLA, EOMI  CV: well-perfused, " normal skin color for ethnicity, regular rate and rhythm  PULM: breathing comfortably, in no respiratory distress,, clear to auscultation upper and lower lung fields  ABD: nondistended, soft and nontender, nonperitonitic  EXT: Full range of motion.  No edema.  NEURO: awake, conversant, grossly normal bilateral upper and lower extremity strength & ROM   SKIN: No rashes, ecchymosis, or lacerations  PSYCH: Calm and cooperative, interactive     ED Course, Procedures, & Data      Procedures          Results for orders placed or performed during the hospital encounter of 06/28/24   XR Chest 2 Views     Status: None    Narrative    Exam: XR CHEST 2 VIEWS, 6/28/2024 6:16 PM    Comparison: 5/6/2024    History: dyspnea    Findings:  PA and lateral views of the chest. Left chest wall pacemaker. Stable  cardiac silhouette. Cardiac stents appreciated on lateral radiograph.  No pneumothorax or pleural effusion.  No acute opacities. IVC filter  in the upper abdomen noted.      Impression    Impression: No acute opacities.     I have personally reviewed the examination and initial interpretation  and I agree with the findings.    EVELYN VORA MD         SYSTEM ID:  [id number]   INR     Status: Abnormal   Result Value Ref Range    INR 1.49 (H) 0.85 - 1.15   Comprehensive metabolic panel     Status: Abnormal   Result Value Ref Range    Sodium 140 135 - 145 mmol/L    Potassium 4.3 3.4 - 5.3 mmol/L    Carbon Dioxide (CO2) 17 (L) 22 - 29 mmol/L    Anion Gap 13 7 - 15 mmol/L    Urea Nitrogen 85.0 (H) 8.0 - 23.0 mg/dL    Creatinine 1.88 (H) 0.51 - 0.95 mg/dL    GFR Estimate 25 (L) >60 mL/min/1.73m2    Calcium 8.7 (L) 8.8 - 10.2 mg/dL    Chloride 110 (H) 98 - 107 mmol/L    Glucose 105 (H) 70 - 99 mg/dL    Alkaline Phosphatase 58 40 - 150 U/L    AST 27 0 - 45 U/L    ALT 9 0 - 50 U/L    Protein Total 6.1 (L) 6.4 - 8.3 g/dL    Albumin 3.7 3.5 - 5.2 g/dL    Bilirubin Total 0.3 <=1.2 mg/dL   CBC with platelets and differential     Status:  Abnormal   Result Value Ref Range    WBC Count 6.4 4.0 - 11.0 10e3/uL    RBC Count 2.46 (L) 3.80 - 5.20 10e6/uL    Hemoglobin 7.5 (L) 11.7 - 15.7 g/dL    Hematocrit 25.0 (L) 35.0 - 47.0 %     (H) 78 - 100 fL    MCH 30.5 26.5 - 33.0 pg    MCHC 30.0 (L) 31.5 - 36.5 g/dL    RDW 16.6 (H) 10.0 - 15.0 %    Platelet Count 65 (L) 150 - 450 10e3/uL    % Neutrophils      % Lymphocytes      % Monocytes      % Eosinophils      % Basophils      % Immature Granulocytes      NRBCs per 100 WBC 0 <1 /100    Absolute Neutrophils      Absolute Lymphocytes      Absolute Monocytes      Absolute Eosinophils      Absolute Basophils      Absolute Immature Granulocytes      Absolute NRBCs 0.0 10e3/uL   Manual Differential     Status: None   Result Value Ref Range    % Neutrophils 58 %    % Lymphocytes 27 %    % Monocytes 8 %    % Eosinophils 4 %    % Basophils 3 %    Absolute Neutrophils 3.7 1.6 - 8.3 10e3/uL    Absolute Lymphocytes 1.7 0.8 - 5.3 10e3/uL    Absolute Monocytes 0.5 0.0 - 1.3 10e3/uL    Absolute Eosinophils 0.3 0.0 - 0.7 10e3/uL    Absolute Basophils 0.2 0.0 - 0.2 10e3/uL    RBC Morphology Confirmed RBC Indices     Platelet Assessment  Automated Count Confirmed. Platelet morphology is normal.     Automated Count Confirmed. Platelet morphology is normal.   Hemoglobin and hematocrit     Status: Abnormal   Result Value Ref Range    Hemoglobin 8.2 (L) 11.7 - 15.7 g/dL    Hematocrit 26.0 (L) 35.0 - 47.0 %   EKG 12-lead, tracing only     Status: None (Preliminary result)   Result Value Ref Range    Systolic Blood Pressure  mmHg    Diastolic Blood Pressure  mmHg    Ventricular Rate 62 BPM    Atrial Rate 62 BPM    MD Interval 166 ms    QRS Duration 78 ms     ms    QTc 412 ms    P Axis 89 degrees    R AXIS 178 degrees    T Axis 165 degrees    Interpretation ECG       ** Suspect arm lead reversal, interpretation assumes no reversal  Sinus rhythm  Low voltage QRS  Lateral infarct , age undetermined  Inferior infarct , age  undetermined  Abnormal ECG     Adult Type and Screen     Status: None   Result Value Ref Range    ABO/RH(D) O NEG     Antibody Screen Negative Negative    SPECIMEN EXPIRATION DATE [id number]    Prepare red blood cells (unit)     Status: None   Result Value Ref Range    Blood Component Type Red Blood Cells     Product Code O9637A20     Unit Status Transfused     Unit Number [id number]     CROSSMATCH Compatible     CODING SYSTEM [id number]     ISSUE DATE AND TIME [id number]     UNIT ABO/RH O-     UNIT TYPE ISBT 9500    CBC with platelets differential     Status: Abnormal    Narrative    The following orders were created for panel order CBC with platelets differential.  Procedure                               Abnormality         Status                     ---------                               -----------         ------                     CBC with platelets and d...[[id number]]  Abnormal            Final result               Manual Differential[[id number]]                              Final result                 Please view results for these tests on the individual orders.   ABO/Rh type and screen     Status: None    Narrative    The following orders were created for panel order ABO/Rh type and screen.  Procedure                               Abnormality         Status                     ---------                               -----------         ------                     Adult Type and Screen[[id number]]                            Final result                 Please view results for these tests on the individual orders.   Results for orders placed or performed in visit on 06/28/24   Hemoglobin and hematocrit     Status: Abnormal   Result Value Ref Range    Hemoglobin 7.9 (LL) 11.7 - 15.7 g/dL    Hematocrit 25.1 (L) 35.0 - 47.0 %    Narrative    Result confirmed by repeat test.         Medications   pantoprazole (PROTONIX) IV push injection 40 mg (40 mg Intravenous $Given 6/28/24 3115)   lactated ringers  BOLUS 500 mL (0 mLs Intravenous Stopped 6/28/24 4673)     Labs Ordered and Resulted from Time of ED Arrival to Time of ED Departure   INR - Abnormal       Result Value    INR 1.49 (*)    COMPREHENSIVE METABOLIC PANEL - Abnormal    Sodium 140      Potassium 4.3      Carbon Dioxide (CO2) 17 (*)     Anion Gap 13      Urea Nitrogen 85.0 (*)     Creatinine 1.88 (*)     GFR Estimate 25 (*)     Calcium 8.7 (*)     Chloride 110 (*)     Glucose 105 (*)     Alkaline Phosphatase 58      AST 27      ALT 9      Protein Total 6.1 (*)     Albumin 3.7      Bilirubin Total 0.3     CBC WITH PLATELETS AND DIFFERENTIAL - Abnormal    WBC Count 6.4      RBC Count 2.46 (*)     Hemoglobin 7.5 (*)     Hematocrit 25.0 (*)      (*)     MCH 30.5      MCHC 30.0 (*)     RDW 16.6 (*)     Platelet Count 65 (*)     % Neutrophils        % Lymphocytes        % Monocytes        % Eosinophils        % Basophils        % Immature Granulocytes        NRBCs per 100 WBC 0      Absolute Neutrophils        Absolute Lymphocytes        Absolute Monocytes        Absolute Eosinophils        Absolute Basophils        Absolute Immature Granulocytes        Absolute NRBCs 0.0     HEMOGLOBIN AND HEMATOCRIT - Abnormal    Hemoglobin 8.2 (*)     Hematocrit 26.0 (*)    DIFFERENTIAL    % Neutrophils 58      % Lymphocytes 27      % Monocytes 8      % Eosinophils 4      % Basophils 3      Absolute Neutrophils 3.7      Absolute Lymphocytes 1.7      Absolute Monocytes 0.5      Absolute Eosinophils 0.3      Absolute Basophils 0.2      RBC Morphology Confirmed RBC Indices      Platelet Assessment        Value: Automated Count Confirmed. Platelet morphology is normal.   TYPE AND SCREEN, ADULT    ABO/RH(D) O NEG      Antibody Screen Negative      SPECIMEN EXPIRATION DATE [id number]     PREPARE RED BLOOD CELLS (UNIT)    Blood Component Type Red Blood Cells      Product Code E3752N43      Unit Status Transfused      Unit Number [id number]      CROSSMATCH Compatible       CODING SYSTEM JMRH170      ISSUE DATE AND TIME [id number]      UNIT ABO/RH O-      UNIT TYPE ISBT 9500     PREPARE RED BLOOD CELLS (UNIT)   TRANSFUSE RED BLOOD CELLS (UNIT)   ABO/RH TYPE AND SCREEN     XR Chest 2 Views   Final Result   Impression: No acute opacities.       I have personally reviewed the examination and initial interpretation   and I agree with the findings.      EVELYN VORA MD            SYSTEM ID:  [id number]             Critical Care Addendum  My initial assessment, based on my review of prehospital provider report, review of nursing observations, review of vital signs, focused history, and physical exam, established a high suspicion that Tessa Mansfield has  anemia requiring blood transfusion , which requires immediate intervention, and therefore she is critically ill.     After the initial assessment, the care team initiated multiple lab tests, initiated IV fluid administration, initiated medication therapy with blood transfusion, and consulted with GI  to provide stabilization care. Due to the critical nature of this patient, I reassessed nursing observations, vital signs, physical exam, mental status, neurologic status, and respiratory status multiple times prior to her disposition.     Time also spent performing documentation, reviewing test results, discussion with consultants, and coordination of care.     Critical care time (excluding teaching time and procedures): 60 minutes.       Assessment & Plan    89-year-old female with a complicated past medical history including CAD status post multiple stents, sick sinus syndrome and pacemaker, paroxysmal atrial fibrillation, CKD, hypertension recently admitted in the setting of GI bleed found to have anemia of acute blood loss as well as chronic kidney disease requiring admission and blood transfusions a number of times presenting to the emergency department due to ongoing fatigue and weakness in the setting of worsening downtrending  hemoglobin from baseline found to be down to 7.9 today, 7.5 here.    Patient endorses that she did have some dark stools a couple of days ago, but is not sure if this was in the setting of her eating more liver for food than normal.  Since then however she has had normalization of her stools brown.  Denies any rectal bleeding.  No shortness of breath at rest but does feel short of breath while sitting most consistent with her exerting herself most consistent with her anemia.  No evidence of CHF although she does have some mild lower extremity edema not making this particularly concerning and no significant JVD.  Will plan for gentle fluid bolus that she did have an initial blood pressure of 94/56.  This is on the lower side for the patient.  Not feeling dizzy or lightheaded related to it.  Abdomen is soft and nontender.  Low suspicion of an acute intra-abdominal process.    Per the notes from GI and medicine during this most recent hospital stay, will plan for symptomatic treatment here, and at this point I do not see any cause of acute bleeding.  Will speak with GI to ensure that the plan is still to try and manage her mostly outpatient.  If we are able to give her a blood transfusion here and she feels better, will consider discharge home    11:50 PM after speaking with GI they are in agreement with the plan for transfusion and reevaluation.  They do not recommend a repeat colonoscopy at this point and states that it is not uncommon for AVMs to continue to ooze.  They do recommend however that if she has any other clinically significant bleeding she should present to the emergency department again.  She has not had any for the 8 hours she has been here in the emergency department.  She is getting 1 unit of IV packed red blood cells and her repeat H&H has improved from 7.5 up to 8.2 after transfusion.  She is feeling better and feels comfortable with plan for discharge.  We discussed that per GI, she may do well  with IV iron versus outpatient blood transfusions.  She is aware of this and will plan to call her doctor on Monday for ongoing coordination or represent sooner with worsening severe symptoms    I have reviewed the nursing notes. I have reviewed the findings, diagnosis, plan and need for follow up with the patient.    Discharge Medication List as of 6/28/2024 11:49 PM          Final diagnoses:   Acute on chronic anemia   Anticoagulated   Dyspnea on exertion   AVM (arteriovenous malformation)       Rachel Valentin MD  [facility] EMERGENCY DEPARTMENT  6/28/2024     Rachel Valentin MD  06/28/24 3356

## 2024-06-28 NOTE — TELEPHONE ENCOUNTER
I received a phone call from [facility] concerning the critical result of Hgb 7.8. This lab was ordered by Dr. Martin at nephrology.  They mentioned they couldn't reach Dr. Martin's office and thus contacted our clinic instead.    I promptly called the patient and strongly recommended that she proceed to the Emergency Department due to her symptoms, including black stools and extreme fatigue.

## 2024-06-28 NOTE — TELEPHONE ENCOUNTER
" Health Call Center    Phone Message    May a detailed message be left on voicemail: yes     Reason for Call: Other: Pt went in for labs this morning and states she has been feeling \"off\" ever since. Pt expressed she feels exhausted and that her balance is off. Pt also states her stool now has some black color in it. Please review and call pt to discuss.      Action Taken: Other: PCC    Travel Screening: Not Applicable                                                                       "

## 2024-06-28 NOTE — ED TRIAGE NOTES
"Chief Complaint: Patient presents to the ED for evaluation of low hemoglobin. Patient reports this is an ongoing issue.    Onset of Symptoms: Today    Home Remedies: N/A    Triage Vital Signs: BP 94/56   Pulse 61   Temp 97.7  F (36.5  C) (Oral)   Resp 16   Ht 1.651 m (5' 5\")   Wt 65.8 kg (145 lb)   SpO2 100%   BMI 24.13 kg/m      Rojelio Malik RN  6/28/2024     Triage Assessment (Adult)       Row Name 06/28/24 1643          Triage Assessment    Airway WDL WDL        Respiratory WDL    Respiratory WDL WDL        Skin Circulation/Temperature WDL    Skin Circulation/Temperature WDL WDL        Cardiac WDL    Cardiac WDL WDL        Peripheral/Neurovascular WDL    Peripheral Neurovascular WDL WDL        Cognitive/Neuro/Behavioral WDL    Cognitive/Neuro/Behavioral WDL WDL                     "

## 2024-06-28 NOTE — TELEPHONE ENCOUNTER
I received a phone call from [facility] concerning the critical result of Hgb 7.8. This lab was ordered by Dr. Martin at nephrology.  They mentioned they couldn't reach Dr. Martin's office and thus contacted our clinic instead.    I promptly called the patient and strongly recommended that she proceed to the Emergency Department due to her symptoms, including black stools and extreme fatigue

## 2024-06-29 LAB
ATRIAL RATE - MUSE: 62 BPM
DIASTOLIC BLOOD PRESSURE - MUSE: NORMAL MMHG
INTERPRETATION ECG - MUSE: NORMAL
P AXIS - MUSE: 89 DEGREES
PR INTERVAL - MUSE: 166 MS
QRS DURATION - MUSE: 78 MS
QT - MUSE: 406 MS
QTC - MUSE: 412 MS
R AXIS - MUSE: 178 DEGREES
SYSTOLIC BLOOD PRESSURE - MUSE: NORMAL MMHG
T AXIS - MUSE: 165 DEGREES
VENTRICULAR RATE- MUSE: 62 BPM

## 2024-06-29 NOTE — DISCHARGE INSTRUCTIONS
You were seen in the emergency department due to low blood counts.  You had a blood transfusion here.  Overall the rest of your lab work is similar to your normal.  We do recommend that you follow-up with your primary care doctor on Monday and have a conversation about whether outpatient blood transfusions or IV iron would be a reasonable option for you.  Otherwise, return to the emergency department if you're feeling worse including increasing weakness, dizziness, or if you have any significant bleeding

## 2024-07-01 ENCOUNTER — HOSPITAL ENCOUNTER (OUTPATIENT)
Facility: CLINIC | Age: 89
End: 2024-07-01
Attending: INTERNAL MEDICINE | Admitting: INTERNAL MEDICINE
Payer: COMMERCIAL

## 2024-07-01 ENCOUNTER — TELEPHONE (OUTPATIENT)
Dept: FAMILY MEDICINE | Facility: CLINIC | Age: 89
End: 2024-07-01
Payer: COMMERCIAL

## 2024-07-01 ENCOUNTER — TELEPHONE (OUTPATIENT)
Dept: GASTROENTEROLOGY | Facility: CLINIC | Age: 89
End: 2024-07-01
Payer: COMMERCIAL

## 2024-07-01 DIAGNOSIS — N18.9 CHRONIC KIDNEY DISEASE: Primary | ICD-10-CM

## 2024-07-01 DIAGNOSIS — D64.9 ANEMIA: ICD-10-CM

## 2024-07-01 DIAGNOSIS — D64.9 ANEMIA: Primary | ICD-10-CM

## 2024-07-01 RX ORDER — BISACODYL 5 MG/1
TABLET, DELAYED RELEASE ORAL
Qty: 4 TABLET | Refills: 0 | Status: SHIPPED | OUTPATIENT
Start: 2024-07-01 | End: 2024-07-04

## 2024-07-01 NOTE — TELEPHONE ENCOUNTER
Pt was informed the direction of blood thinners prior to the EGD/Colonoscopy.    She wants to recheck CBC tomorrow. OK to order?    Soon-Mi

## 2024-07-01 NOTE — TELEPHONE ENCOUNTER
"Endoscopy Scheduling Screen    Have you had a positive Covid test in the last 14 days?  No    What is your communication preference for Instructions and/or Bowel Prep?   MyChart    What insurance is in the chart?  Other:  [facility]    Ordering/Referring Provider:     Pedro Gomez MD in [facility]      (If ordering provider performs procedure, schedule with ordering provider unless otherwise instructed. )    BMI: Estimated body mass index is 24.13 kg/m  as calculated from the following:    Height as of 6/28/24: 1.651 m (5' 5\").    Weight as of 6/28/24: 65.8 kg (145 lb).     Sedation Ordered  moderate sedation.   If patient BMI > 50 do not schedule in [facility].    If patient BMI > 45 do not schedule at [facility].    Are you taking methadone or Suboxone?  No    Have you had difficulties, pain, or discomfort during past endoscopy procedures?  No    Are you taking any prescription medications for pain 3 or more times per week?   NO, No RN review required.    Do you have a history of malignant hyperthermia?  No    (Females) Are you currently pregnant?   No     Have you been diagnosed or told you have pulmonary hypertension?   No    Do you have an LVAD?  No    Have you been told you have moderate to severe sleep apnea?  No    Have you been told you have COPD, asthma, or any other lung disease?  No    Do you have any heart conditions?  Yes- patient goes into AFIB sometimes.    In the past year, have you had any hospitalizations for heart related issues including cardiomyopathy, heart attack, or stent placement?  No    Do you have any implantable devices in your body (pacemaker, ICD)?  Pacemaker (schedule colonoscopy in Hospital Only)    Do you take nitroglycerine?  No    Have you ever had or are you waiting for an organ transplant?  No. Continue scheduling, no site restrictions.    Have you had a stroke or transient ischemic attack (TIA aka \"mini stroke\" in the last 6 months?   No    Have you been diagnosed with or " "been told you have cirrhosis of the liver?   No    Are you currently on dialysis?   No    Do you need assistance transferring?   No    BMI: Estimated body mass index is 24.13 kg/m  as calculated from the following:    Height as of 6/28/24: 1.651 m (5' 5\").    Weight as of 6/28/24: 65.8 kg (145 lb).     Is patients BMI > 40 and scheduling location [facility]?  No    Do you take an injectable medication for weight loss or diabetes (excluding insulin)?  No    Do you take the medication Naltrexone?  No    Do you take blood thinners?  Yes     Are you taking Effient/Prasugrel?  No, you must contact your prescribing provider for direction on holding or bridging with a different medication.       Prep   Are you currently on dialysis or do you have chronic kidney disease?  Yes (Golytely Prep)    Do you have a diagnosis of diabetes?  No    Do you have a diagnosis of cystic fibrosis (CF)?  No    On a regular basis do you go 3 -5 days between bowel movements?  No    BMI > 40?  No    Preferred Pharmacy:  [facility]     Final Scheduling Details     Procedure scheduled  Colonoscopy / Upper endoscopy (EGD)    Surgeon:  Vignesh Huff- colonoscopy EGD- with mallery     Date of procedure:  07/09/2024-colonoscopy EGD-10/16/2024     Pre-OP / PAC:   No - Not required for this site.    Location  [facility] - Per exclusion criteria.    Sedation   Moderate Sedation - Per order.      Patient Reminders:   You will receive a call from a Nurse to review instructions and health history.  This assessment must be completed prior to your procedure.  Failure to complete the Nurse assessment may result in the procedure being cancelled.      On the day of your procedure, please designate an adult(s) who can drive you home stay with you for the next 24 hours. The medicines used in the exam will make you sleepy. You will not be able to drive.      You cannot take public transportation, ride share services, or non-medical taxi service without a responsible " caregiver.  Medical transport services are allowed with the requirement that a responsible caregiver will receive you at your destination.  We require that drivers and caregivers are confirmed prior to your procedure.

## 2024-07-01 NOTE — TELEPHONE ENCOUNTER
Pre assessment completed for upcoming procedure.   (Please see previous telephone encounter notes for complete details)      Procedure details:    Arrival time and facility location reviewed.    Pre op exam needed? N/A    Designated  policy reviewed. Instructed to have someone stay 6  hours post procedure.       Medication review:    Medications reviewed. Please see supporting documentation below. Holding recommendations discussed (if applicable).   Blood thinner/Anti-platelet medication(s):  Apixaban (Eliquis): Recommended HOLD 2 days before procedure.  Consult with your managing provider.  Clopidogrel (Plavix): Recommended HOLD 5 days before procedure.  Consult with your managing provider.  Ferrous Sulfate (iron supplement): HOLD 7 days before procedure.      Prep for procedure:     Procedure prep instructions reviewed.        Any additional information needed:  N/A      Patient  verbalized understanding and had no questions or concerns at this time.      Neha Mann RN  Endoscopy Procedure Pre Assessment   [phone] option 4

## 2024-07-01 NOTE — LETTER
July 1, 2024      Tessa Mansfield  [address]  [facility] [address]          Colonoscopy       Procedure date: 7/9/2024     Anticipated arrival time: 11:30 AM   (Please note that arrival times may change)     Facility location: [facility]; [address] [place] [address], [place] [address] - Check in location: Main entrance at registration desk. Parking information: Self pay parking available in the Patient & Visitor Ramp on the corner of  [facility] and [facility].  options are available 24 hours for patients with mobility limitations. Self-park and shuttle service from the parking ramp available. The phone number for shuttle requests is [phone] open Monday-Friday 5:00am-7:00pm        Important Procedure Reminders:      Prep Instructions:   Instructions on how to prepare for your upcoming procedure are found below. Please read instructions carefully. Deviation from instructions may result in less than desired outcomes and procedure may need to be rescheduled.   If you have additional questions regarding how to prepare for your upcoming procedure, contact our endoscopy pre assessment nurses at [phone] option 4 Monday through Friday 7:00am-5:00pm      Policy:   The medications used during the procedure will make you sleepy, so you won't be able to drive. On the day of your procedure, please have an adult ready to drive you home and stay with you for the next 6 hours.   You can't use public transportation, ride-share services, or non-medical taxi services without a responsible caregiver. Medical transport services are okay, but a caregiver must be there to receive you at your destination.   Make sure your  and caregiver are confirmed before your procedure.        Day of procedure:  Please keep in mind that arrival times may change. Let your  know there might be a one-hour window for changes.  We ask that you please check in at the  with your  . Your  should remain on campus.  Expect to be at the procedure center for about 1.5-2.5 hours.    Please do not wear jewelry (i.e. earrings, rings, necklaces, watches, etc). Leave your purse, billfold, credit cards, and other valuables at home.   Bring insurance card and ID.      To cancel or reschedule your procedure:   Within 14 days of your procedure if you develop any flu-like symptoms (such as fever, cough, shortness of breath), COVID-19 like symptoms or exposure to COVID-19, contact our endoscopy team at [phone] option 4 to determine if procedure can be completed or needs to be delayed.   If you need to cancel or reschedule, our endoscopy scheduling team can be reached at [phone], option 2. Monday through Friday, 7:00am-5:00pm.        Medication Reminders:     Please note the following medication holding recommendations:   Blood thinner/Anti-platelet medication(s):  Apixaban (Eliquis): Recommended HOLD 2 days before procedure.  Consult with your managing provider.  Clopidogrel (Plavix): Recommended HOLD 5 days before procedure.  Consult with your managing provider.  Ferrous Sulfate (iron supplement): HOLD 7 days before procedure.     ----------------------------------------------------------------------------------------------------------------------------------------------------------------------------------------------------------------------------------------------------------------------     Golytely Extended Bowel Prep   Prep instructions for your colonoscopy   For prep questions, please call: [facility] - [address], [place] [address] - [phone] option 4    Please read these instructions carefully at least 7 days prior to your colonoscopy procedure. Be sure to follow all directions completely. The inside of your colon must be clean to allow for a complete examination for the presence of any growths, polyps, and/or abnormalities, as well as  their biopsy or removal. A number of tips are included in order to make this part of the procedure as comfortable as possible.    Getting ready      prescription at the pharmacy. Endoscopy team will order this about 2 weeks before to your scheduled procedure. Included in the kit will be the followin  Dulcolax (Bisacodyl) 5mg tablets  Two containers of Polyethylene glycol (Golytely, Colyte, Nulytely, Gavilyte-G)    A nurse will call you to go over your appointment details and prep instructions. Not completing the nurse call could result with your appointment being cancelled.    You must arrange for an adult to drive you home after your exam. Your colonoscopy cannot be done unless you have a ride. If you need to use public transportation, someone must ride with you and stay with you for up to 24 hours.       7 days before procedure     Consult with your prescribing provider about stopping any:     Diabetic/Weight Loss Injectable Medication GLP-1 agonist (such as Exenatide (Byetta, Bydureon),  Mounjaro (Tirzepatide).  Ozempic (Semaglutide). Semaglutide. Symlin (Pamlintide), Tanzeum (Albiglutide). Tirzepatide-Weight Management (Zepbound), Trulicity (Dulaglutide), Victoza (Saxenda, Liraglutide), Wegovy (Semaglutide) please follow below guidelines for holding:    For weekly injection HOLD 7 days before procedure.  For once or twice a day injection HOLD the day before procedure and day of procedure.  For oral, daily dosing (Rybelsus) HOLD 7 days before procedure.    Blood thinning and/or anti-platelet medications: such as Coumadin, Plavix, Xarelto, Eliquis, Lovenox or others, these medications may need to be stopped temporarily before your procedure.     If you take insulin for diabetes, ask your prescribing provider for instructions on how to manage this medication while preparing for a colonoscopy.     NSAIDs medications such as Sulindac, Celebrex, Mobic, Relafen or others may need to be stopped before the  procedure. This will be discussed during nurse review call, or you can reach out to your prescribing provider.      Stop taking iron (ferrous sulfate), multivitamins that contain iron, and/or fiber supplements (Metamucil, Benefiber, Psyllium husk powder, Fibercon, Bran, etc.).      Stop eating whole kernel corn, popcorn, nuts, and foods that contain seeds. These can stay in the colon for many days, and they can clog up the colonoscope.    Begin a low-fiber diet. (See examples below). No Olestra (a fat substitute).   Consume no more than 10-15 grams of fiber each day.         LOW FIBER DIET   You may have: Do not have:    Starches: White bread, rolls, biscuits, croissants, Raquel toast, white flour tortillas, waffles, pancakes, Mont[place]grin toast; white rice, noodles, pasta, macaroni; cooked and peeled potatoes; plain crackers, saltines; cooked farina or cream of rice; puffed rice, corn flakes, Rice Krispies, Special K      Vegetables: tender cooked and canned, vegetable broths     Fruits and fruit juices: Strained fruit juice, canned fruit without seeds or skin (not pineapple), applesauce, pear sauce, ripe bananas, melons (not watermelon)     Milk products: Milk (plain or flavored), cheese, cottage cheese, yogurt (no berries), custard, ice cream       Proteins: Tender, well-cooked ground beef, lamb, veal, ham, pork, chicken, turkey, fish or organ meat, Tofu, eggs, creamy peanut butter      Fats and condiments: Margarine, butter, oils, mayonnaise, sour cream, salad dressing, plain gravy; spices, cooked herbs; sugar, clear jelly, honey, syrup      Snacks, sweets and drinks: Pretzels, hard candy; plain cakes and cookies (no nuts or seeds); gelatin, plain pudding, sherbet, Popsicles; coffee, tea, carbonated ( fizzy ) drinks     Starches: Breads or rolls that contain nuts, seeds or fruit; whole wheat or whole grain breads that contain more than 2 grams of fiber per serving; cornbread; corn or whole wheat tortillas; potatoes  with skin; brown rice, wild rice, quinoa, kasha (buckwheat), and oatmeal      Vegetables: Any raw or steamed vegetables; vegetables with seeds; corn in any form      Fruits and fruit juices: Prunes, prune juice, raisins and other dried fruits, berries and other fruits with seeds, canned pineapple juices with pulp such as orange, grapefruit, pineapple or tomato juice     Milk products: Any yogurt with nuts, seeds or berries      Proteins: Tough, fibrous meats with gristle; cooked dried beans, peas or lentils; crunchy peanut butter     Fats and condiments: Pickles, olives, relish, horseradish; jam, marmalade, preserves      Snacks, sweets and drinks: Popcorn, nuts, seeds, granola, coconut, candies made with nuts or seeds; all desserts that contain nuts, seeds, raisins and other dried fruits, coconut, whole grains or bran.       2 days before procedure       You may have a light, low fiber breakfast and lunch. Begin a clear liquid diet AFTER 1 PM. Do not eat solid foods. (See examples below).    Drink at least eight to ten 8-ounce glasses of water throughout the day  ? ? ? ? ? ? ? ?    Fill one container of Golytely with a full gallon of warm water. Cover and shake until well mixed. Chill in refrigerator until it is time to drink solution.     CLEAR LIQUID DIET:  You can have: Do not have:    Water, tea, coffee (no milk or cream)   Soda pop, Gatorade (not red or purple)   Coconut water   Jell-O, Popsicles (no milk or fruit pieces - not red or purple)   Fat-free soup broth or bouillon   Plain hard candy, such as clear life savers (not red or purple)   Clear juices and fruit-flavored drinks, such as apple juice, white grape juice, Hi-C, and Waldemar-Aid (not red or purple)    Milk or milk products such as ice cream, malts or shakes, or coffee creamer   Red or purple drinks of any kind such as cranberry juice, grape juice, or Waldemar-Aid. Avoid red or purple Jell-O, Popsicles, sorbet, sherbet and candy.   Juices with pulp such  as orange, grapefruit, pineapple, or tomato juice   Cream soups of any kind   Alcohol and beer   Protein drinks or protein powder     Step 1     At 4 PM, take 2 Dulcolax (Bisacodyl) tablets.  At 5 PM, start drinking one 8-ounce glass of Golytely mixture every 15 minutes until the container is HALF empty. Drink each glass quickly. Store the rest in the refrigerator.   Continue to drink clear liquids.      Reminders While Drinking Laxatives:     After you start drinking the solution, stay near a toilet. You may have watery stools (diarrhea), mild cramping, bloating, and nausea. You may want to use Vaseline on the skin around your anus after each bowel movement or use wet wipes to prevent irritation. Bowel movements will be liquid and dark in color at first and then should turn clear yellow in color. Drinking the prepared solution may make you cold, wearing warm clothing can be helpful.    Some find it helpful to:  For added flavor, include a crystal light lemonade packet in each glass of Golytely, rather than mixing it into the entire prepared mixture.  In between each glass, try sucking on a lemon or a piece of hard candy to help diminish the flavor of the preparation.  Drinking from a straw can help minimize the taste of the prepared mixture.    If you have nausea or vomiting during drinking the solution, rinse your mouth with water and take a 15-30 minute break and then continue drinking solution.         1 day before procedure       Continue on a clear liquid diet. Do not eat solid foods.    Drink at least eight to ten 8-ounce glasses of water throughout the day  ? ? ? ? ? ? ? ?    Stop taking any NSAIDs pain relievers such as Advil, Ibuprofen, Motrin, etc. You may take Tylenol.      Step 2     At 4 PM, take 2 Dulcolax (Bisacodyl) tablets.  At 5 PM, start drinking the 2nd half of Golytely mixture. Drink one 8-ounce glass of Golytely mixture every 15 minutes until the container is empty.  Drink each glass quickly.    Continue to drink clear liquids.    Before you go to bed mix the second container of Golytely and place in the refrigerator for the morning.     Day of procedure     Step 3     6 hours before your arrival time, start drinking one 8-ounce glass of Golytely mixture every 15 minutes until the container is HALF empty. You will not drink the entire container. The remaining solution can be discarded.     2 hours before your arrival time stop drinking all liquids, including water.   Do not smoke or swallow anything, including water or gum for at least 2 hours before your arrival time. This is a safety issue. Your procedure could be cancelled if you do not follow directions.  No chewing tobacco 6 hours prior to procedure arrival time.     You may take your necessary morning medications with sips of water (4 ounces).   Do not take diabetes medicine by mouth until after your exam.  If you have asthma, bring your inhalers.  Please perform your nebulizer treatments and airway clearance therapy in the morning prior to the procedure (if applicable).    Arrive with a responsible adult who can drive you home and stay with you for a minimum of 24 hours. The medications used during the procedure will make you sleepy, so you won't be able to drive yourself home.   You cannot use public transportation, ride-share services, or non-medical taxi services without a responsible caregiver. Medical transport services are okay, but a caregiver must be there to receive you at your destination.  Please check in with your  when you arrive. Drivers should stay on campus.    Expect to be at the procedure center for about 1.5-2.5 hours.    Do not wear jewelry (i.e. earrings, rings, necklaces, watches, etc.). Leave your purse, billfold, credit cards, and other valuables at home.      Bring insurance card and ID.         Answers to Commonly Asked Questions     How soon can I eat after the procedure?  You may resume your normal diet when you  feel ready, unless advised otherwise by the doctor performing your procedure. We recommend starting with a light meal.   Do not drink alcohol for 24 hours after your procedure.  You may resume normal activities (work, exercise, etc.) after 24 hours.    How will I feel after the procedure?  It is normal to feel bloated and gassy after your procedure. Walking will help move the air through your colon. You can take non-aspirin pain relievers that contain acetaminophen (Tylenol).  If you are having sedation, we require a responsible adult to take you home for your safety. The sedation medicines used to relax you during the procedure can impair your judgement and reaction time and make you forgetful and possibly a little unsteady.  Do not drive, make any important decisions, or sign any legal documents for 24 hours after your procedure.    When will I get my test results?  You should have your procedure results and any lab results (if applicable) by letter, [employer] message, or phone call within 2 weeks. If you have any questions, please call the doctor that referred you for the procedure.    How do I know if my colon is cleaned out?   After completing the bowel prep, your bowel movements should be all liquid and yellow. Your bowel movements will look similar to urine in the toilet. If there are pieces of stool (poop) in the toilet, or if you can't see to the bottom of the toilet, please call our office for advice. Call [phone] and ask to speak with a nurse.    Why is the Golytely bowel prep taken in several steps?   The stool is flushed out by a large wave of fluid going through the colon. Just sipping a large volume of the solution will not achieve the desired result. Studies have shown that two smaller waves (or more in some cases) are better than one large one.      What if I need to cancel or reschedule my procedure?  Contact our endoscopy scheduling team at [phone], option 2. Monday through Friday,  7:00am-5:00pm.

## 2024-07-01 NOTE — TELEPHONE ENCOUNTER
[facility] Call Center    Phone Message    May a detailed message be left on voicemail: yes     Reason for Call: Cancel the OT:  due to patient doing well and doesn't need it.  Action Taken: Message routed to:  [facility]    Travel Screening: Not Applicable     Date of Service:

## 2024-07-01 NOTE — TELEPHONE ENCOUNTER
Pt had orders for EGD/Colonoscopy on 6/13/24, but was not scheduled yet. I sent the message to the endoscopy dept. Await for the response.

## 2024-07-01 NOTE — TELEPHONE ENCOUNTER
Pt is on Eliquis 2.5 mg BID and Plavix 75 mg, is scheduled EGD/Colonoscopy on 7/9/24.   How do you want to proceed with colonoscopy?    Soon-Mi

## 2024-07-01 NOTE — TELEPHONE ENCOUNTER
[facility] Call Center    Phone Message    May a detailed message be left on voicemail: yes     Reason for Call: Other: Per pt would like a call back asap. Per pt has had this weekend blacks stools. Per pt currently been doing blood infusions also. Per pt would like to request a colonoscopy is possible. Please call pt back to to discuss. Thank you!      Action Taken: Message routed to:  [facility]    Travel Screening: Not Applicable     Date of Service:

## 2024-07-01 NOTE — TELEPHONE ENCOUNTER
Pre visit planning completed.      Procedure details:    Patient scheduled for Colonoscopy  on 7/9/2024.     Arrival time: 1130. Procedure time 1230    Facility location: [facility]; [address], [place] [address]. Check in location: Main entrance at registration desk.    Sedation type: Conscious sedation     Pre op exam needed? N/A    Indication for procedure: Anemia [D64.9]       Chart review:     Electronic implanted devices? Yes: pacemaker    Recent diagnosis of diverticulitis within the last 6 weeks? No    Diabetic? No      Medication review:    Anticoagulants? Yes Apixaban (Eliquis): Recommended HOLD 2 days before procedure.  Consult with your managing provider.  Clopidogrel (Plavix): Recommended HOLD 5 days before procedure.  Consult with your managing provider.    NSAIDS? No NSAID medications per patient's medication list.  RN will verify with pre-assessment call.    Other medication HOLDING recommendations:  Ferrous sulfate (iron supplements): HOLD 7 days before procedure.      Prep for procedure:     Bowel prep recommendation: Extended Golytely. Bowel prep prescription sent to Data Unavailable   Due to: provider request/ordered.    Prep instructions sent via mychart/letter         Neha Mann RN  Endoscopy Procedure Pre Assessment RN  [phone] option 4

## 2024-07-01 NOTE — TELEPHONE ENCOUNTER
[facility] Call Center    Phone Message    May a detailed message be left on voicemail: yes     Reason for Call: Patient call to see if it is ok to stop the medication on 7/3/2024 for procedure of Colonscopy on 7/9/2024. Please call her back to discuss further.      Action Taken: Message routed to:  [facility] ([facility]    Travel Screening: Not Applicable     Date of Service:

## 2024-07-02 ENCOUNTER — MEDICAL CORRESPONDENCE (OUTPATIENT)
Dept: HEALTH INFORMATION MANAGEMENT | Facility: CLINIC | Age: 89
End: 2024-07-02
Payer: COMMERCIAL

## 2024-07-02 NOTE — PROGRESS NOTES
First Injectafer scheduled for 7/30/24.     Barbara Dimas RN   Anemia Services  [facility]  [email or web address]   Office : [phone]  Fax: [phone]

## 2024-07-03 ENCOUNTER — APPOINTMENT (OUTPATIENT)
Dept: CT IMAGING | Facility: CLINIC | Age: 89
DRG: 377 | End: 2024-07-03
Attending: STUDENT IN AN ORGANIZED HEALTH CARE EDUCATION/TRAINING PROGRAM
Payer: COMMERCIAL

## 2024-07-03 ENCOUNTER — PATIENT OUTREACH (OUTPATIENT)
Dept: NEPHROLOGY | Facility: CLINIC | Age: 89
End: 2024-07-03

## 2024-07-03 ENCOUNTER — HOSPITAL ENCOUNTER (INPATIENT)
Facility: CLINIC | Age: 89
LOS: 3 days | Discharge: HOME HEALTH | DRG: 377 | End: 2024-07-06
Attending: STUDENT IN AN ORGANIZED HEALTH CARE EDUCATION/TRAINING PROGRAM | Admitting: STUDENT IN AN ORGANIZED HEALTH CARE EDUCATION/TRAINING PROGRAM
Payer: COMMERCIAL

## 2024-07-03 ENCOUNTER — NURSE TRIAGE (OUTPATIENT)
Dept: NURSING | Facility: CLINIC | Age: 89
End: 2024-07-03

## 2024-07-03 DIAGNOSIS — K92.2 GASTROINTESTINAL HEMORRHAGE, UNSPECIFIED GASTROINTESTINAL HEMORRHAGE TYPE: ICD-10-CM

## 2024-07-03 DIAGNOSIS — I48.0 PAROXYSMAL ATRIAL FIBRILLATION (H): Primary | ICD-10-CM

## 2024-07-03 DIAGNOSIS — D64.9 SYMPTOMATIC ANEMIA: ICD-10-CM

## 2024-07-03 DIAGNOSIS — Z95.5 S/P CORONARY ARTERY STENT PLACEMENT: ICD-10-CM

## 2024-07-03 LAB
ABO/RH(D): NORMAL
ALBUMIN SERPL BCG-MCNC: 3.7 G/DL (ref 3.5–5.2)
ALP SERPL-CCNC: 50 U/L (ref 40–150)
ALT SERPL W P-5'-P-CCNC: 16 U/L (ref 0–50)
ANION GAP SERPL CALCULATED.3IONS-SCNC: 13 MMOL/L (ref 7–15)
ANTIBODY SCREEN: NEGATIVE
APTT PPP: 36 SECONDS (ref 22–38)
AST SERPL W P-5'-P-CCNC: 31 U/L (ref 0–45)
ATRIAL RATE - MUSE: 65 BPM
BASE EXCESS BLDV CALC-SCNC: -2.4 MMOL/L (ref -3–3)
BASOPHILS # BLD AUTO: ABNORMAL 10*3/UL
BASOPHILS # BLD AUTO: ABNORMAL 10*3/UL
BASOPHILS # BLD MANUAL: 0 10E3/UL (ref 0–0.2)
BASOPHILS # BLD MANUAL: 0.1 10E3/UL (ref 0–0.2)
BASOPHILS NFR BLD AUTO: ABNORMAL %
BASOPHILS NFR BLD AUTO: ABNORMAL %
BASOPHILS NFR BLD MANUAL: 0 %
BASOPHILS NFR BLD MANUAL: 2 %
BILIRUB DIRECT SERPL-MCNC: <0.2 MG/DL (ref 0–0.3)
BILIRUB SERPL-MCNC: 0.3 MG/DL
BLD PROD TYP BPU: NORMAL
BLOOD COMPONENT TYPE: NORMAL
BUN SERPL-MCNC: 71.6 MG/DL (ref 8–23)
CALCIUM SERPL-MCNC: 9.1 MG/DL (ref 8.8–10.2)
CHLORIDE SERPL-SCNC: 107 MMOL/L (ref 98–107)
CODING SYSTEM: NORMAL
CREAT SERPL-MCNC: 2 MG/DL (ref 0.51–0.95)
CROSSMATCH: NORMAL
DEPRECATED HCO3 PLAS-SCNC: 21 MMOL/L (ref 22–29)
DIASTOLIC BLOOD PRESSURE - MUSE: NORMAL MMHG
EGFRCR SERPLBLD CKD-EPI 2021: 24 ML/MIN/1.73M2
EOSINOPHIL # BLD AUTO: ABNORMAL 10*3/UL
EOSINOPHIL # BLD AUTO: ABNORMAL 10*3/UL
EOSINOPHIL # BLD MANUAL: 0 10E3/UL (ref 0–0.7)
EOSINOPHIL # BLD MANUAL: 0 10E3/UL (ref 0–0.7)
EOSINOPHIL NFR BLD AUTO: ABNORMAL %
EOSINOPHIL NFR BLD AUTO: ABNORMAL %
EOSINOPHIL NFR BLD MANUAL: 0 %
EOSINOPHIL NFR BLD MANUAL: 0 %
ERYTHROCYTE [DISTWIDTH] IN BLOOD BY AUTOMATED COUNT: 18.1 % (ref 10–15)
ERYTHROCYTE [DISTWIDTH] IN BLOOD BY AUTOMATED COUNT: 18.3 % (ref 10–15)
GLUCOSE SERPL-MCNC: 107 MG/DL (ref 70–99)
HCO3 BLDV-SCNC: 22 MMOL/L (ref 21–28)
HCT VFR BLD AUTO: 21.8 % (ref 35–47)
HCT VFR BLD AUTO: 22.7 % (ref 35–47)
HGB BLD-MCNC: 6.9 G/DL (ref 11.7–15.7)
HGB BLD-MCNC: 7.1 G/DL (ref 11.7–15.7)
IMM GRANULOCYTES # BLD: ABNORMAL 10*3/UL
IMM GRANULOCYTES # BLD: ABNORMAL 10*3/UL
IMM GRANULOCYTES NFR BLD: ABNORMAL %
IMM GRANULOCYTES NFR BLD: ABNORMAL %
INR PPP: 1.54 (ref 0.85–1.15)
INTERPRETATION ECG - MUSE: NORMAL
ISSUE DATE AND TIME: NORMAL
LACTATE SERPL-SCNC: 2.4 MMOL/L (ref 0.7–2)
LACTATE SERPL-SCNC: 2.6 MMOL/L (ref 0.7–2)
LIPASE SERPL-CCNC: 170 U/L (ref 13–60)
LYMPHOCYTES # BLD AUTO: ABNORMAL 10*3/UL
LYMPHOCYTES # BLD AUTO: ABNORMAL 10*3/UL
LYMPHOCYTES # BLD MANUAL: 1.1 10E3/UL (ref 0.8–5.3)
LYMPHOCYTES # BLD MANUAL: 1.8 10E3/UL (ref 0.8–5.3)
LYMPHOCYTES NFR BLD AUTO: ABNORMAL %
LYMPHOCYTES NFR BLD AUTO: ABNORMAL %
LYMPHOCYTES NFR BLD MANUAL: 16 %
LYMPHOCYTES NFR BLD MANUAL: 21 %
MCH RBC QN AUTO: 30 PG (ref 26.5–33)
MCH RBC QN AUTO: 30.3 PG (ref 26.5–33)
MCHC RBC AUTO-ENTMCNC: 31.3 G/DL (ref 31.5–36.5)
MCHC RBC AUTO-ENTMCNC: 31.7 G/DL (ref 31.5–36.5)
MCV RBC AUTO: 95 FL (ref 78–100)
MCV RBC AUTO: 97 FL (ref 78–100)
MONOCYTES # BLD AUTO: ABNORMAL 10*3/UL
MONOCYTES # BLD AUTO: ABNORMAL 10*3/UL
MONOCYTES # BLD MANUAL: 0.6 10E3/UL (ref 0–1.3)
MONOCYTES # BLD MANUAL: 1.3 10E3/UL (ref 0–1.3)
MONOCYTES NFR BLD AUTO: ABNORMAL %
MONOCYTES NFR BLD AUTO: ABNORMAL %
MONOCYTES NFR BLD MANUAL: 19 %
MONOCYTES NFR BLD MANUAL: 7 %
NEUTROPHILS # BLD AUTO: ABNORMAL 10*3/UL
NEUTROPHILS # BLD AUTO: ABNORMAL 10*3/UL
NEUTROPHILS # BLD MANUAL: 4.3 10E3/UL (ref 1.6–8.3)
NEUTROPHILS # BLD MANUAL: 6.2 10E3/UL (ref 1.6–8.3)
NEUTROPHILS NFR BLD AUTO: ABNORMAL %
NEUTROPHILS NFR BLD AUTO: ABNORMAL %
NEUTROPHILS NFR BLD MANUAL: 63 %
NEUTROPHILS NFR BLD MANUAL: 72 %
NRBC # BLD AUTO: 0 10E3/UL
NRBC # BLD AUTO: 0 10E3/UL
NRBC # BLD AUTO: 0.1 10E3/UL
NRBC BLD AUTO-RTO: 0 /100
NRBC BLD AUTO-RTO: 1 /100
NRBC BLD MANUAL-RTO: 2 %
NT-PROBNP SERPL-MCNC: 2153 PG/ML (ref 0–1800)
O2/TOTAL GAS SETTING VFR VENT: 21 %
OXYHGB MFR BLDV: 23 % (ref 70–75)
P AXIS - MUSE: 91 DEGREES
PCO2 BLDV: 35 MM HG (ref 40–50)
PH BLDV: 7.41 [PH] (ref 7.32–7.43)
PLAT MORPH BLD: ABNORMAL
PLAT MORPH BLD: ABNORMAL
PLATELET # BLD AUTO: 84 10E3/UL (ref 150–450)
PLATELET # BLD AUTO: 92 10E3/UL (ref 150–450)
PO2 BLDV: 18 MM HG (ref 25–47)
POLYCHROMASIA BLD QL SMEAR: SLIGHT
POLYCHROMASIA BLD QL SMEAR: SLIGHT
POTASSIUM SERPL-SCNC: 3.8 MMOL/L (ref 3.4–5.3)
PR INTERVAL - MUSE: 216 MS
PROT SERPL-MCNC: 6.1 G/DL (ref 6.4–8.3)
QRS DURATION - MUSE: 82 MS
QT - MUSE: 406 MS
QTC - MUSE: 422 MS
R AXIS - MUSE: 23 DEGREES
RBC # BLD AUTO: 2.3 10E6/UL (ref 3.8–5.2)
RBC # BLD AUTO: 2.34 10E6/UL (ref 3.8–5.2)
RBC MORPH BLD: ABNORMAL
RBC MORPH BLD: ABNORMAL
RETICS # AUTO: 0.14 10E6/UL (ref 0.03–0.1)
RETICS/RBC NFR AUTO: 5.9 % (ref 0.5–2)
SAO2 % BLDV: 23.5 % (ref 70–75)
SODIUM SERPL-SCNC: 141 MMOL/L (ref 135–145)
SPECIMEN EXPIRATION DATE: NORMAL
SYSTOLIC BLOOD PRESSURE - MUSE: NORMAL MMHG
T AXIS - MUSE: 44 DEGREES
TROPONIN T SERPL HS-MCNC: 33 NG/L
UNIT ABO/RH: NORMAL
UNIT NUMBER: NORMAL
UNIT STATUS: NORMAL
UNIT TYPE ISBT: 9500
VENTRICULAR RATE- MUSE: 65 BPM
WBC # BLD AUTO: 6.9 10E3/UL (ref 4–11)
WBC # BLD AUTO: 8.6 10E3/UL (ref 4–11)

## 2024-07-03 PROCEDURE — 74174 CTA ABD&PLVS W/CONTRAST: CPT | Mod: 26 | Performed by: RADIOLOGY

## 2024-07-03 PROCEDURE — 86923 COMPATIBILITY TEST ELECTRIC: CPT | Performed by: STUDENT IN AN ORGANIZED HEALTH CARE EDUCATION/TRAINING PROGRAM

## 2024-07-03 PROCEDURE — 36415 COLL VENOUS BLD VENIPUNCTURE: CPT | Performed by: STUDENT IN AN ORGANIZED HEALTH CARE EDUCATION/TRAINING PROGRAM

## 2024-07-03 PROCEDURE — 93005 ELECTROCARDIOGRAM TRACING: CPT | Performed by: STUDENT IN AN ORGANIZED HEALTH CARE EDUCATION/TRAINING PROGRAM

## 2024-07-03 PROCEDURE — 82248 BILIRUBIN DIRECT: CPT | Performed by: STUDENT IN AN ORGANIZED HEALTH CARE EDUCATION/TRAINING PROGRAM

## 2024-07-03 PROCEDURE — 85610 PROTHROMBIN TIME: CPT | Performed by: STUDENT IN AN ORGANIZED HEALTH CARE EDUCATION/TRAINING PROGRAM

## 2024-07-03 PROCEDURE — 99223 1ST HOSP IP/OBS HIGH 75: CPT | Mod: 24

## 2024-07-03 PROCEDURE — 85060 BLOOD SMEAR INTERPRETATION: CPT | Performed by: STUDENT IN AN ORGANIZED HEALTH CARE EDUCATION/TRAINING PROGRAM

## 2024-07-03 PROCEDURE — 83690 ASSAY OF LIPASE: CPT | Performed by: STUDENT IN AN ORGANIZED HEALTH CARE EDUCATION/TRAINING PROGRAM

## 2024-07-03 PROCEDURE — 83605 ASSAY OF LACTIC ACID: CPT | Performed by: STUDENT IN AN ORGANIZED HEALTH CARE EDUCATION/TRAINING PROGRAM

## 2024-07-03 PROCEDURE — 99418 PROLNG IP/OBS E/M EA 15 MIN: CPT

## 2024-07-03 PROCEDURE — 99207 PR APP CREDIT; MD BILLING SHARED VISIT: CPT | Mod: FS | Performed by: STUDENT IN AN ORGANIZED HEALTH CARE EDUCATION/TRAINING PROGRAM

## 2024-07-03 PROCEDURE — 120N000002 HC R&B MED SURG/OB UMMC

## 2024-07-03 PROCEDURE — P9016 RBC LEUKOCYTES REDUCED: HCPCS | Performed by: STUDENT IN AN ORGANIZED HEALTH CARE EDUCATION/TRAINING PROGRAM

## 2024-07-03 PROCEDURE — 85007 BL SMEAR W/DIFF WBC COUNT: CPT | Performed by: STUDENT IN AN ORGANIZED HEALTH CARE EDUCATION/TRAINING PROGRAM

## 2024-07-03 PROCEDURE — 85027 COMPLETE CBC AUTOMATED: CPT | Performed by: STUDENT IN AN ORGANIZED HEALTH CARE EDUCATION/TRAINING PROGRAM

## 2024-07-03 PROCEDURE — 85730 THROMBOPLASTIN TIME PARTIAL: CPT | Performed by: STUDENT IN AN ORGANIZED HEALTH CARE EDUCATION/TRAINING PROGRAM

## 2024-07-03 PROCEDURE — 250N000011 HC RX IP 250 OP 636: Performed by: STUDENT IN AN ORGANIZED HEALTH CARE EDUCATION/TRAINING PROGRAM

## 2024-07-03 PROCEDURE — 93010 ELECTROCARDIOGRAM REPORT: CPT | Performed by: STUDENT IN AN ORGANIZED HEALTH CARE EDUCATION/TRAINING PROGRAM

## 2024-07-03 PROCEDURE — 84484 ASSAY OF TROPONIN QUANT: CPT | Performed by: STUDENT IN AN ORGANIZED HEALTH CARE EDUCATION/TRAINING PROGRAM

## 2024-07-03 PROCEDURE — 250N000009 HC RX 250: Performed by: STUDENT IN AN ORGANIZED HEALTH CARE EDUCATION/TRAINING PROGRAM

## 2024-07-03 PROCEDURE — 96361 HYDRATE IV INFUSION ADD-ON: CPT | Performed by: STUDENT IN AN ORGANIZED HEALTH CARE EDUCATION/TRAINING PROGRAM

## 2024-07-03 PROCEDURE — 99223 1ST HOSP IP/OBS HIGH 75: CPT | Mod: FS

## 2024-07-03 PROCEDURE — 80053 COMPREHEN METABOLIC PANEL: CPT | Performed by: STUDENT IN AN ORGANIZED HEALTH CARE EDUCATION/TRAINING PROGRAM

## 2024-07-03 PROCEDURE — 999N000128 HC STATISTIC PERIPHERAL IV START W/O US GUIDANCE

## 2024-07-03 PROCEDURE — 99285 EMERGENCY DEPT VISIT HI MDM: CPT | Mod: 25 | Performed by: STUDENT IN AN ORGANIZED HEALTH CARE EDUCATION/TRAINING PROGRAM

## 2024-07-03 PROCEDURE — 258N000003 HC RX IP 258 OP 636: Performed by: STUDENT IN AN ORGANIZED HEALTH CARE EDUCATION/TRAINING PROGRAM

## 2024-07-03 PROCEDURE — 86900 BLOOD TYPING SEROLOGIC ABO: CPT | Performed by: STUDENT IN AN ORGANIZED HEALTH CARE EDUCATION/TRAINING PROGRAM

## 2024-07-03 PROCEDURE — 96360 HYDRATION IV INFUSION INIT: CPT | Mod: 59 | Performed by: STUDENT IN AN ORGANIZED HEALTH CARE EDUCATION/TRAINING PROGRAM

## 2024-07-03 PROCEDURE — 83880 ASSAY OF NATRIURETIC PEPTIDE: CPT | Performed by: STUDENT IN AN ORGANIZED HEALTH CARE EDUCATION/TRAINING PROGRAM

## 2024-07-03 PROCEDURE — 74174 CTA ABD&PLVS W/CONTRAST: CPT

## 2024-07-03 PROCEDURE — 85045 AUTOMATED RETICULOCYTE COUNT: CPT | Performed by: STUDENT IN AN ORGANIZED HEALTH CARE EDUCATION/TRAINING PROGRAM

## 2024-07-03 PROCEDURE — 250N000013 HC RX MED GY IP 250 OP 250 PS 637

## 2024-07-03 PROCEDURE — 82805 BLOOD GASES W/O2 SATURATION: CPT | Performed by: STUDENT IN AN ORGANIZED HEALTH CARE EDUCATION/TRAINING PROGRAM

## 2024-07-03 PROCEDURE — 99291 CRITICAL CARE FIRST HOUR: CPT | Performed by: STUDENT IN AN ORGANIZED HEALTH CARE EDUCATION/TRAINING PROGRAM

## 2024-07-03 RX ORDER — IOPAMIDOL 755 MG/ML
89 INJECTION, SOLUTION INTRAVASCULAR ONCE
Status: COMPLETED | OUTPATIENT
Start: 2024-07-03 | End: 2024-07-03

## 2024-07-03 RX ORDER — ROSUVASTATIN CALCIUM 20 MG/1
20 TABLET, COATED ORAL DAILY
Status: DISCONTINUED | OUTPATIENT
Start: 2024-07-04 | End: 2024-07-06 | Stop reason: HOSPADM

## 2024-07-03 RX ORDER — CALCIUM CARBONATE 500 MG/1
1000 TABLET, CHEWABLE ORAL 4 TIMES DAILY PRN
Status: DISCONTINUED | OUTPATIENT
Start: 2024-07-03 | End: 2024-07-06 | Stop reason: HOSPADM

## 2024-07-03 RX ORDER — PANTOPRAZOLE SODIUM 40 MG/1
40 TABLET, DELAYED RELEASE ORAL 2 TIMES DAILY
Status: DISCONTINUED | OUTPATIENT
Start: 2024-07-04 | End: 2024-07-06 | Stop reason: HOSPADM

## 2024-07-03 RX ORDER — METHOCARBAMOL 500 MG/1
500 TABLET, FILM COATED ORAL EVERY 6 HOURS PRN
Status: DISCONTINUED | OUTPATIENT
Start: 2024-07-03 | End: 2024-07-06 | Stop reason: HOSPADM

## 2024-07-03 RX ORDER — TIMOLOL MALEATE 5 MG/ML
1 SOLUTION/ DROPS OPHTHALMIC DAILY
Status: DISCONTINUED | OUTPATIENT
Start: 2024-07-04 | End: 2024-07-06 | Stop reason: HOSPADM

## 2024-07-03 RX ORDER — LIDOCAINE 40 MG/G
CREAM TOPICAL
Status: DISCONTINUED | OUTPATIENT
Start: 2024-07-03 | End: 2024-07-06 | Stop reason: HOSPADM

## 2024-07-03 RX ORDER — AMOXICILLIN 250 MG
2 CAPSULE ORAL 2 TIMES DAILY PRN
Status: DISCONTINUED | OUTPATIENT
Start: 2024-07-03 | End: 2024-07-06 | Stop reason: HOSPADM

## 2024-07-03 RX ORDER — SODIUM BICARBONATE 650 MG/1
650 TABLET ORAL 2 TIMES DAILY
Status: DISCONTINUED | OUTPATIENT
Start: 2024-07-04 | End: 2024-07-06 | Stop reason: HOSPADM

## 2024-07-03 RX ORDER — ISOSORBIDE MONONITRATE 20 MG/1
60 TABLET ORAL 2 TIMES DAILY
Status: DISCONTINUED | OUTPATIENT
Start: 2024-07-03 | End: 2024-07-06 | Stop reason: HOSPADM

## 2024-07-03 RX ORDER — AMOXICILLIN 250 MG
1 CAPSULE ORAL 2 TIMES DAILY PRN
Status: DISCONTINUED | OUTPATIENT
Start: 2024-07-03 | End: 2024-07-06 | Stop reason: HOSPADM

## 2024-07-03 RX ORDER — ALLOPURINOL 100 MG/1
100 TABLET ORAL DAILY
Status: DISCONTINUED | OUTPATIENT
Start: 2024-07-04 | End: 2024-07-06 | Stop reason: HOSPADM

## 2024-07-03 RX ORDER — CLOPIDOGREL BISULFATE 75 MG/1
75 TABLET ORAL DAILY
Status: DISCONTINUED | OUTPATIENT
Start: 2024-07-03 | End: 2024-07-06 | Stop reason: HOSPADM

## 2024-07-03 RX ORDER — ONDANSETRON 2 MG/ML
4 INJECTION INTRAMUSCULAR; INTRAVENOUS EVERY 30 MIN PRN
Status: DISCONTINUED | OUTPATIENT
Start: 2024-07-03 | End: 2024-07-06 | Stop reason: HOSPADM

## 2024-07-03 RX ORDER — POTASSIUM CHLORIDE 750 MG/1
20 TABLET, EXTENDED RELEASE ORAL DAILY
Status: DISCONTINUED | OUTPATIENT
Start: 2024-07-04 | End: 2024-07-06 | Stop reason: HOSPADM

## 2024-07-03 RX ORDER — FUROSEMIDE 40 MG
40 TABLET ORAL DAILY
Status: DISCONTINUED | OUTPATIENT
Start: 2024-07-04 | End: 2024-07-06 | Stop reason: HOSPADM

## 2024-07-03 RX ORDER — FLUTICASONE FUROATE AND VILANTEROL 100; 25 UG/1; UG/1
1 POWDER RESPIRATORY (INHALATION) DAILY
Status: DISCONTINUED | OUTPATIENT
Start: 2024-07-03 | End: 2024-07-06 | Stop reason: HOSPADM

## 2024-07-03 RX ORDER — METOPROLOL TARTRATE 50 MG
50 TABLET ORAL 2 TIMES DAILY
Status: DISCONTINUED | OUTPATIENT
Start: 2024-07-04 | End: 2024-07-06 | Stop reason: HOSPADM

## 2024-07-03 RX ORDER — AMLODIPINE BESYLATE 10 MG/1
10 TABLET ORAL DAILY
Status: DISCONTINUED | OUTPATIENT
Start: 2024-07-04 | End: 2024-07-06 | Stop reason: HOSPADM

## 2024-07-03 RX ADMIN — SODIUM CHLORIDE, PRESERVATIVE FREE 80 ML: 5 INJECTION INTRAVENOUS at 15:53

## 2024-07-03 RX ADMIN — IOPAMIDOL 89 ML: 755 INJECTION, SOLUTION INTRAVENOUS at 15:53

## 2024-07-03 RX ADMIN — SODIUM CHLORIDE 500 ML: 9 INJECTION, SOLUTION INTRAVENOUS at 13:27

## 2024-07-03 RX ADMIN — ISOSORBIDE MONONITRATE 60 MG: 20 TABLET ORAL at 20:56

## 2024-07-03 ASSESSMENT — ACTIVITIES OF DAILY LIVING (ADL)
ADLS_ACUITY_SCORE: 40
ADLS_ACUITY_SCORE: 38

## 2024-07-03 ASSESSMENT — COLUMBIA-SUICIDE SEVERITY RATING SCALE - C-SSRS
1. IN THE PAST MONTH, HAVE YOU WISHED YOU WERE DEAD OR WISHED YOU COULD GO TO SLEEP AND NOT WAKE UP?: NO
2. HAVE YOU ACTUALLY HAD ANY THOUGHTS OF KILLING YOURSELF IN THE PAST MONTH?: NO
6. HAVE YOU EVER DONE ANYTHING, STARTED TO DO ANYTHING, OR PREPARED TO DO ANYTHING TO END YOUR LIFE?: NO

## 2024-07-03 NOTE — CONSULTS
Gastroenterology Consultation and Sign-Off  Note  GI Luminal Service    Date of Admission:  7/3/2024  Reason for Admission: Acute on Chronic Anemia, Fatigue, Shortness of Breath, Intermittent Dark Stools   Date of Consult  7/3/2024   Requesting Physician:  Olvin Faye MD           ASSESSMENT AND RECOMMENDATIONS:   Assessment:  Tessa Mansfield is a pleasant 89 year old female with a history of chronic anemia, CAD with history of multiple stents (most recently 11/6/2023 LAD PCI) on Apixaban and Clopidogrel, sick sinus syndrome status-post dual-chamber PPM, paroxysmal atrial fibrillation, mild-moderate mitral regurgitation, moderate tricuspid regurgitation, 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 hypertensive nephrosclerosis, hypertension, chronic cough, history of bleeding 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     Patient had an admission 4/13/2024-4/22/2024 at [facility] for small bowel obstruction status-post exploratory laparotomy 4/13/2024 with hemorrhagic bowel without necrosis, multiple areas small bowel torsion around several small adhesions status-post adhesiolysis and transverse colon epiploicae complicated by bleeding with return to the operating room 4/13/2024 for exploratory laparotomy found to have slow ooze from suture line along inferior aspect of the transverse meso colon (site of prior lysis of adhesion) that was controlled with additional suture, quick clot gauze and an Abthera device.  Patient was taken back to the OR on 4/14/2024 for abdominal re-exploration and closure that reports healthy bowel, small amount of bleeding from an area on the proximal jejunum where adhesions had been taken down, minimal clot in the abdomen, removal of quick-clot gauze, and abdominal midline wound closure.  Patient was discharged to TCU on 4/22/2024.      Patient was then admitted 5/1/2024-5/7/2024 for reported melena on Clopidogrel (Plavix) and Apixaban (Eliquis), for which the GI Luminal Service was consulted and patient underwent EGD without source of upper GI bleeding identified (no obvious abnormality to explain patient's reportedly black stools), at which time it was discussed that highest on the differential remained oozing GI angiodysplasias in the setting of DOAC and antiplatelet therapy, favoring continuing conservative management and supportive cares including IV iron supplementation and transfusing as needed with close follow-up in outpatient cardiac/nephrology anemia clinic for acute on chronic anemia.     Patient was again admitted on 6/20/2024 for acute on chronic anemia in the absence of overt GI bleeding with brown stools with ongoing recommendation for Cardiac/Nephrology Anemia clinic for acute on chronic normocytic anemia for ongoing hemoglobin and iron studies monitoring, consider outpatient transfusion orders for packed red blood cell transfusions as needed and IV iron infusions as needed.     Patient again presents to the ED today 7/3/2024 with fatigue and shortness of breath and intermittent dark formed stools in the setting of ongoing oral iron, found to have a hemoglobin of 6.9 g/dL on initial check then 7.1 g/dL on recheck.       # Acute on Chronic Normocytic Anemia  # Chronic Thrombocytopenia, since 5/4/2024, stable  # Chronic Iron Deficiency on Oral Iron PTA  # Intermittent Dark Formed Stools  # History of bleeding colonic angiodysplastic lesion in the ascending colon treated with clips (MR conditional) 7/21/2023  # Many Colon Polyps 7/21/2023  # Diverticulosis in Entire Colon  Patient presented with acute on chronic normocytic anemia with intermittent dark formed stools (reports intermittent formed black - formed dark brown - then formed brown stools) in the setting of chronic iron  deficiency reportedly taking oral iron and known history of GI AVMs. Oral iron likely contributing to intermittent dark stools. Patient confirms still not being set up in outpatient anemia clinic. Patient reports having outpatient IV iron infusion scheduled. Patient has undergone colonoscopy and EGD in 7/2023 and EGD again 5/2024.     Of note: given recent abdominal surgery with adhesions, would not recommend small bowel video capsule (PillCam) , as small bowel video capsule endoscopy (PillCam) is at higher risk for getting stuck in the GI tract (with the potential to require surgery for removal). Patient would require patency capsule (dissolvable capsule) to ensure passage prior to consideration of PillCam, though still the PillCam would be at risk for getting stuck in the GI tract. The risk outweigh the benefits at this time.     No hemodynamically significant overt GI bleeding (hematemesis, hematochezia, bright red blood per rectum, melena [black, tarry, sticky stool]) at this time. In the absence of hemodynamically significant overt GI bleeding, the pretest probability of finding a GI endoscopically intervenable lesion is low. No acute/urgent indication for GI endoscopy at this time. Additionally, would not pursue further GI endoscopic intervention unless patient experiences hemodynamically significant overt GI bleeding given most likely intermittent oozing from GI AVMs contributing to patient's acute on chronic anemia.     Extensive discussion with patient regarding risks/benefits of GI endoscopies in the setting of GI AVMs/angiodysplasias. Additionally, patient was noted on previous colonoscopy 7/2023 to have many polyps that were not removed given the patient's recurrent concern for GI bleeding and the patient's age. Patient continues on Plavix and DOAC. Patient expresses valid concerns regarding the bowel preparation and feeling fatigued at this time, concern for becoming more weak and falling during the  bowel prep. Patient is scheduled for outpatient colonoscopy that patient will be cancelling as the risks outweigh the benefits in the absence of hemodynamically significant overt GI bleeding and with increased risk of post-procedural bleeding if polypectomies were performed given cardiology has recommended to continue both Plavix and DOAC (would be held for the procedure but then restarted after the procedure).      In light of advanced kidney and cardiac disease plus history of colonic AVM/angiodysplasia, there is a pre-test possibility for small bowel AVM (angiodysplasia) disease contributing to the chronic iron deficiency and acute on chronic anemia picture but ablative (deep small bowel enteroscopy + Argon Plasma Coagulation [APC]) or disease-modifying therapies (subcutaneous octreotide - generally not pursued unless patient fails iron supplementation) would not be appropriate unless active VISIBLE bleeding were demonstrated clinically. Given the multiple active medical comorbidities, aggressive/invasive interventions may not provide adequate benefit for all the risks incurred.     The GI Luminal Service continues to recommend iron repletion with IV iron infusions and discontinuing oral iron as oral iron will also turn the patient's stools black, making it difficult to differentiate medication effect versus concern for GI bleeding, which continues to be the case on oral iron prior to admission. Again, patient continues to have acute on chronic anemia despite PTA oral iron.        Please see previous Hematology Consult note from 6/20/2024 with discussion regarding patient's acute on chronic normocytic anemia noted multifactorial with iron deficiency anemia along with anemia of kidney disease/inflammation in the setting of chronic cardiac disease and chronic kidney disease; additionally commented on chronic thrombocytopenia with concern for immune process driving thrombocytopenia. Erythropoietin checked on  6/20/2024 was 16 mU/mL. Additionally recommended Cardiology consultation to evaluate for de-escalation of anticoagulation if possible.     Cardiology was consulted on 6/20/2024 noting the patient is appropriately treated with Plavix for CAD and DOAC to prevent future stroke with decision to continue both and plan to revisit the issue in the event of future bleed. Recommend re-consulting Cardiology to discuss risk/benefit of ongoing Plavix and DOAC given recurrent ED presentations for acute on chronic anemia requiring blood transfusions, taking into consideration the discussion regarding GI AVMs/angiodysplasias above; additional question is hemoglobin transfusion threshold from a cardiac standpoint with patient's chronic cardiac disease.     As previously discussed and documented by the GI Luminal Service when patient was seen during previous admissions, the patient would benefit from referral to cardiac/nephrology anemia clinic for acute on chronic normocytic anemia with ongoing outpatient hemoglobin and iron studies monitoring with consideration of orders for outpatient packed red blood cell transfusions as needed and IV iron infusions as needed.      Recommendations:  -- No indication for acute GI endoscopic intervention at this time.   - No plan for future GI endoscopies in this 89 year old patient unless the patient experiences hemodynamically significant overt GI bleeding as discussed above.     -- Strict documentation of rectal output.  - Appreciate detailed documentation of stool appearance, including color, consistency, frequency and amount.    - Consider smearing stool thinly on white paper towel to distinguish color.     -- Cardiology Consult: re-discuss risk/benefit of ongoing Plavix and DOAC given recurrent ED presentations for acute on chronic anemia requiring blood transfusions, taking into consideration the discussion above regarding GI AVMs/angiodysplasias likely chronically oozing contributing to  acute on chronic anemia; additional question is hemoglobin transfusion threshold from a cardiac standpoint with patient's chronic cardiac disease.     -- Regarding anticoagulation/antiplatelet therapy, from a GI perspective, no contraindication to restarting/continuing anticoagulation/antiplatelets. Defer decision and risk/benefit discussion to primary team and Cardiology.     -- Maintain 2 large bore IVs, 18G or larger.  -- Continue Supportive Cares  - Adequate volume resuscitation with IVF, pRBCs.  - Monitor Hemoglobin closely. Transfuse to keep Hgb > 7 g/dL from GI standpoint. Defer to cardiology whether they recommend transfusion to keep Hgb > 8 g/dL from a cardiac standpoint.    - Monitor Platelets closely. Keep PLT > 50 10e3/uL from GI standpoint.  - Maintain hemodynamics: MAP >65 mmHg and HR <100.  - Monitor and optimize electrolytes.  - Monitor and optimize nutrition. --> Diet per primary team.   - Reposition every 30 minutes while awake.  - Encourage Ambulation as able: 4-6 walks per day.   -- Avoid NSAIDs.  -- Analgesics/Antiemetics per primary team.  -- If the patient experiences overt GI bleeding with hemodynamic instability, please page the GI Luminal Service (listed on [facility]).       Outpatient:   -- No outpatient GI Clinic Follow-up necessary.   -- Please cancel outpatient Colonoscopy.   -- Referral to the Cardiac/Nephrology Anemia clinic for acute on chronic normocytic anemia for ongoing hemoglobin and iron studies monitoring, consider outpatient transfusion orders for packed red blood cell transfusions as needed and IV iron infusions as needed.       COVID status: not tested this admission.     Discussed with ED Physician Dr. Olvin Faye.  Discussed with Maulik Mcgovern PA-C - [facility] Team.     The inpatient gastroenterology service will sign off at this time. Thank you for allowing us to participate in the care of this patient. Please do not hesitate to page the GI service with any  questions or concerns.     The patient was discussed and plan agreed upon with Dr. Jose Donaldson, GI Luminal Service staff physician.    Overall time spent on the date of this encounter preparing to see the patient (including chart review of available notes, clinical status events, imaging and labs); discussing, ordering, coordinating recommended medications, tests or procedures; communicating with other health care professionals; and documenting the above clinical information in the electronic medical record was 90 minutes.    Brianna Michael PA-C  Inpatient Gastroenterology Service  [facility]  Text Page         History of Present Illness:   Patient seen and examined at 1600. History is obtained from patient and chart review.    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     Patient had an admission 4/13/2024-4/22/2024 at [facility] for small bowel obstruction status-post exploratory laparotomy 4/13/2024 with hemorrhagic bowel without necrosis, multiple areas small bowel torsion around several small adhesions status-post adhesiolysis and transverse colon epiploicae complicated by bleeding with return to the operating room 4/13/2024 for exploratory laparotomy found to have slow ooze from suture line along inferior aspect of the transverse meso colon  (site of prior lysis of adhesion) that was controlled with additional suture, quick clot gauze and an Abthera device.  Patient was taken back to the OR on 4/14/2024 for abdominal re-exploration and closure that reports healthy bowel, small amount of bleeding from an area on the proximal jejunum where adhesions had been taken down, minimal clot in the abdomen, removal of quick-clot gauze, and abdominal midline wound closure. Patient was discharged to TCU on 4/22/2024.      Patient was then admitted 5/1/2024-5/7/2024 for reported melena on Clopidogrel (Plavix) and Apixaban (Eliquis), for which the GI Luminal Service was consulted and patient underwent EGD without source of upper GI bleeding identified (no obvious abnormality to explain patient's reportedly black stools), at which time it was discussed that highest on the differential remained oozing GI angiodysplasias in the setting of DOAC and antiplatelet therapy, favoring continuing conservative management and supportive cares including IV iron supplementation and transfusing as needed with close follow-up in outpatient cardiac/nephrology anemia clinic for acute on chronic anemia.     Patient was again admitted on 6/20/2024 for acute on chronic anemia in the absence of overt GI bleeding with brown stools with ongoing recommendation for Cardiac/Nephrology Anemia clinic for acute on chronic normocytic anemia for ongoing hemoglobin and iron studies monitoring, consider outpatient transfusion orders for packed red blood cell transfusions as needed and IV iron infusions as needed.     Patient again presents to the ED today 7/3/2024 with fatigue and shortness of breath and intermittent dark formed stools in the setting of ongoing oral iron, found to have a hemoglobin of 6.9 g/dL on initial check then 7.1 g/dL on recheck.       Patient endorses taking ongoing oral iron at home prior to admission. Patient reports intermittent black formed stool followed by brown stools.      Denies nausea, vomiting, acid reflux, heartburn.     Reports a brief episode of aching abdominal pain this morning that spontaneously resolved.     Patient reports upcoming colonoscopy scheduled outpatient - nervous and does not really want to do it. Concerned about the bowel prep. Feels fatigued right now and concerned about further fatigue with the prep.     Discussed assessment and plan as above extensively with the patient. Patient expressed understanding and wants to hold off on GI endoscopy at this time, which is very reasonable. Patient had no additional questions/concerns for the GI Luminal Service.       Previous Procedures:     5/2/2024 - EGD - Dr. Jose Donaldson  Impression:            This is a 89 year old with significant cardiac history                          on plavix and apixaban and recent SBO s/p lysis of                          adhesions who presents with melena and stable                          hemoglobin.                          There was patchy nodular mucosa in body of stomach                          which had mild oozing with irritation but unlikely to                          cause melena.                          Recommend resuming antiplatelet/anticoagulation and                          monitoring for ongoing bleeding. If ongoing melena or                          new drop in hemoglobin, would consider colonoscopy.   Addendum   Iron stain with appropriate control is performed on part A.  Iron deposits are confirmed on the special stain suggestive of iron pill gastritis.    Addendum electronically signed by Anthony Canela MD on 5/6/2024 at 10:00 AM   Final Diagnosis   A.  GASTRIC BODY NODULAR REGION, BIOPSY:  -Gastric body mucosa with features of nodular reactive gastropathy  -No H. pylori-like organisms identified on routine stain  -Negative for intestinal metaplasia and dysplasia  -Iron stain has been ordered and will be reported in an addendum          7/21/2023 -  Colonoscopy - Dr. Bekah Dash  Indications:           Hematochezia, Acute post hemorrhagic anemia   Impression:            - One bleeding colonic angiodysplastic lesion in the                          ascending colon. Clips (MR conditional) were placed.                          - Many polyps in the ascending and transverse colon,                          not removed given GI bleed and her age.                          - Diverticulosis in the entire examined colon.                          - No specimens collected.   Recommendation:        - Resume previous diet today.                          - Can discuss risk/benefit of repeat colonoscopy with                          polypectomy in the future as an outpatient                          - Please discuss with cardiology on resumption of                          antiplatelets and Eliquis. From GI bleed perspective,                          it will be beneficial to hold Eliquis at least                          temporarily if not permanently.                          - Can discontinue PPI.      7/20/2023 - EGD - Dr. Bekah Dash  Indications:           Acute post hemorrhagic anemia, Melena   Impression:            - No active bleeding or blood products visualized.                          - Normal esophagus.                          - Erythematous mucosa in the gastric body.                          - Normal duodenal bulb, second portion of the duodenum                          and third portion of the duodenum.                          - No specimens collected.   Recommendation:        - Clear liquid diet today.                          - Will discuss with patient whether colonoscopy will                          be within her goals of care.      4/5/2006 - EGD - Dr. Madhuri Haji  Indications:    Follow-up of acute gastric ulcer with hemorrhage   Impression:     - Normal esophagus.                   - Normal stomach.                   - Normal examined duodenum.    Recommendation: - Use Protonix (pantoprazole) at 40 mg by mouth daily                   indefinitely.      1/31/2006 - EGD - Dr. Star Nye  Indications:         Melena   Impression:          - Normal esophagus.                        - Gastric ulcers marginally oozing blood. Biopsied.                        - Normal duodenal bulb and 2nd part of the duodenum.   Recommendation:      - Return to referring provider today. Avoid Plavix.                        Ideally, would be off ASA. Maximal PPI.   FINAL DIAGNOSIS:  Stomach, body (biopsy of ulcer):  - Antral and transitional mucosa with active chronic gastritis.  - Erosion of the surface epithelium and focal adherent  fibrinoinflammatory debris, suggestive of nearby ulceration.  - No Helicobacter pylori is identified (immunostains for H. pylori are  negative).      1/31/2006 - Colonoscopy - Dr. Star Nye  Indications:         Melena of unknown origin   Impression:          - External and internal, non bleeding, mild hemorrhoids                        were found.                        - Diverticulosis.                        - The terminal ileum is normal.   Recommendation:      - Perform upper GI endoscopy today.      1/30/2006 - EGD - Dr. Bernard Vieira  Indications:         Melena   Impression:          - Normal esophagus.                        - Red blood in the gastric fundus.                        - Normal examined duodenum.               Past Medical History:   Reviewed and edited as appropriate  Past Medical History:   Diagnosis Date    CAD (coronary artery disease)     CAD s/p PCI+BMS to MRCA 10/2002, PCI to mLCX 2/2003, PCI+DESx2 to pLAD 11/2007, PCI+DESx1 to dRCA 12/2007, PCI+ALVAREZ to RPDA 7/2013; on indefinite DAPT  10/27/2002    10/27/2002: AMI s/p PCI+BMS (3.5x18mm Bx velocity) to mRCA 2/5/2003: Back pain; PCI+stent to mLCX c/b distal embolization/slow flow 4/11/2003: Unstable angina; PCI+stent (Hepacoat velocity) to mLAD 11/27/2007: PCI+DESx2 to  pLAD (IVUS w/ calcification of LMCA and ulcerated plaque pLAD, 80% dRCA; also had 80% dLCX, too small to stent) 12/11/2007: Staged PCI. PCI+DESx1 (3.5x13mm Cypher) to dRCA (indefinite DAPT recommended at this time) 6/27/2008: Angina. mLCX stent 70% ISR. LAD and RCA stents patent. Myocardium at risk from LCX felt to be small, medical management preferred to PCI. 11/10/2009: Angina. Findings unchanged from 6/27/2008, FFR LAD 0.90. Medical management recommended. 7/23/2013: Unstable angina; PCI+ALVAREZ to mPDA. Diagnostic findings: LMCA 40% distal. LAD: pLAD stents patent mLAD 30% ISR dLAD 50% diffuse, D2 diffuse disease. LCX 80% mid ISR. RCA diffuse <30% mid, RPLAs diffuse disease, RPDA 100% occlusion.      Cardiac Pacemaker- [employer]tr[employer], dual chamber- NOT dependant 11/28/2007    CKD (chronic kidney disease), stage IV (H)     Hyperlipidemia LDL goal <70     Hypertension     Stented coronary artery 10-    RCA    Stented coronary artery 2-5-2003    LCx    Stented coronary artery 4-    LAD    Stented coronary artery 11-    LAD    Stented coronary artery 12-    RCA    Transient complete heart block (H) 11/28/2007            Past Surgical History:   Reviewed and edited as appropriate   Past Surgical History:   Procedure Laterality Date    CHOLECYSTECTOMY      CV CORONARY ANGIOGRAM N/A 6/17/2022    Procedure: Coronary Angiogram;  Surgeon: Constantine Macias MD;  Location: [facility] CARDIAC CATH LAB    CV CORONARY ANGIOGRAM N/A 7/17/2023    Procedure: Coronary Angiogram;  Surgeon: Constantine Macias MD;  Location: [facility] CARDIAC CATH LAB    CV PCI N/A 6/17/2022    Procedure: Percutaneous Coronary Intervention;  Surgeon: Constantine Macias MD;  Location: [facility] CARDIAC CATH LAB    CV PCI N/A 7/17/2023    Procedure: Percutaneous Coronary Intervention;  Surgeon: Constantine Macias MD;  Location: [facility] CARDIAC CATH LAB    CV PCI N/A 11/6/2023    Procedure: Percutaneous Coronary  Intervention;  Surgeon: Constantine Macias MD;  Location:  [facility] CARDI[facility] CATH LAB    CV RIGHT HEART CATH MEASUREMENTS RECORDED N/A 6/17/2022    Procedure: Right Heart Catheterization;  Surgeon: Constantine Macias MD;  Location:  HEART CARDIAC CATH LAB    EP PACEMAKER N/A 10/15/2019    Procedure: EP PACEMAKER;  Surgeon: Anthony Zhu MD;  Location:  HEART CARDIAC CATH LAB    ESOPHAGOSCOPY, GASTROSCOPY, DUODENOSCOPY (EGD), COMBINED N/A 7/20/2023    Procedure: Esophagoscopy, gastroscopy, duodenoscopy (EGD), combined;  Surgeon: Bekah Dash DO;  Location: [facility]    ESOPHAGOSCOPY, GASTROSCOPY, DUODENOSCOPY (EGD), COMBINED N/A 5/2/2024    Procedure: Esophagoscopy, gastroscopy, duodenoscopy (EGD), combined;  Surgeon: Tavo Donaldson MD;  Location: [facility]    HC PPM INSERTION NEW/REPLACEMENT W/ ATRIAL&VENTRICULAR LEAD  11-    HYSTERECTOMY      LAPAROTOMY EXPLORATORY N/A 4/13/2024    Procedure: Laparotomy exploratory, Wound Vac Placement.;  Surgeon: Anthony Trammell MD;  Location: [facility]    LAPAROTOMY EXPLORATORY N/A 4/14/2024    Procedure: Abdominal Re-Exploration and Closure;  Surgeon: Anthony Trammell MD;  Location: [facility]    LAPAROTOMY EXPLORATORY N/A 4/13/2024    Procedure: Exploratory Laparotomy;  Surgeon: Anthony Trammell MD;  [facility]              Social History:   The patient lives in [place], [place].     Alcohol: Denies.   Tobacco: Denies.   Illicit drugs: Denies.            Family History:   Patient's family history is reviewed today and is non-contributory    Family History   Problem Relation Age of Onset    Cancer Sister 82        bladder cancer             Allergies:   Reviewed and edited as appropriate     Allergies   Allergen Reactions    Codeine Sulfate Itching    Shrimp Swelling    Bactrim [Sulfamethoxazole W/Trimethoprim]      Patient unable to recall    Biaxin [Clarithromycin]     Chlorthalidone Nausea and Vomiting    Clonidine     Darvon  "[Propoxyphene Hcl]     Dilaudid [Hydromorphone] Visual Disturbance and Hallucination     Tolerated in April 2024 hospital admissions    Gabapentin Fatigue and Confusion     \"felt drunk\"    Indomethacin     Levaquin [Levofloxacin Hemihydrate]     Morphine Sulfate     Percocet [Oxycodone-Acetaminophen] Hallucination    Pregabalin Itching and Fatigue    Simvastatin Palpitations     Muscle weakness, leg cramping      Spironolactone      Dehydrated      Terazosin             Medications:     Current Facility-Administered Medications   Medication Dose Route Frequency Provider Last Rate Last Admin    [START ON 7/4/2024] allopurinol (ZYLOPRIM) tablet 100 mg  100 mg Oral Daily Maulik Mcgovern        [START ON 7/4/2024] amLODIPine (NORVASC) tablet 10 mg  10 mg Oral Daily Maulik Mcgovern        [Held by provider] apixaban ANTICOAGULANT (ELIQUIS) tablet 2.5 mg  2.5 mg Oral BID Maulik Mcgovern        calcium carbonate (TUMS) chewable tablet 1,000 mg  1,000 mg Oral 4x Daily PRN Maulik Mcgovern        [Held by provider] clopidogrel (PLAVIX) tablet 75 mg  75 mg Oral Daily Maulik Mcgovern        [START ON 7/4/2024] furosemide (LASIX) tablet 40 mg  40 mg Oral Daily Maulik Mcgovern        isosorbide mononitrate (ISMO/MONOKET) tablet 60 mg  60 mg Oral BID Maulik Mcgovern        lidocaine (LMX4) cream   Topical Q1H PRN Maulik Mcgovern        lidocaine 1 % 0.1-1 mL  0.1-1 mL Other Q1H PRN Maulik Mcgovern        methocarbamol (ROBAXIN) tablet 500 mg  500 mg Oral Q6H PRN Maulik Mcgovern        [START ON 7/4/2024] metoprolol tartrate (LOPRESSOR) tablet 50 mg  50 mg Oral BID Maulik Mcgovern        [START ON 7/4/2024] omeprazole (PriLOSEC) CR capsule 40 mg  40 mg Oral Daily Maulik Mcgovern        ondansetron (ZOFRAN) injection 4 mg  4 mg Intravenous Q30 Min PRN Olvin Faye MD        [START ON 7/4/2024] potassium chloride ER (K-TAB) TBCR 20 mEq  20 mEq Oral Daily Maulik Mcgovern        rosuvastatin (CRESTOR) tablet 20 mg  20 mg Oral " Daily Maulik Mcgovern        senna-docusate (SENOKOT-S/PERICOLACE) 8.6-50 MG per tablet 1 tablet  1 tablet Oral BID PRN Maulik Mcgovern        Or    senna-docusate (SENOKOT-S/PERICOLACE) 8.6-50 MG per tablet 2 tablet  2 tablet Oral BID PRN Maulik Mcgovern        [START ON 7/4/2024] sodium bicarbonate tablet 650 mg  650 mg Oral BID Maulik Mcgovern        sodium chloride (PF) 0.9% PF flush 3 mL  3 mL Intracatheter Q8H Maulik Mcgovern        sodium chloride (PF) 0.9% PF flush 3 mL  3 mL Intracatheter q1 min prn Maulik Mcgovern        timolol maleate (TIMOPTIC) 0.5 % ophthalmic solution 1 drop  1 drop Both Eyes Daily Maulik Mcgovern         Current Outpatient Medications   Medication Sig Dispense Refill    acetaminophen (TYLENOL) 325 MG tablet Take 975 mg by mouth nightly as needed for pain      allopurinol (ZYLOPRIM) 100 MG tablet Take 1 tablet (100 mg) by mouth daily 90 tablet 0    amLODIPine (NORVASC) 10 MG tablet Take 1 tablet (10 mg) by mouth daily 90 tablet 3    apixaban ANTICOAGULANT (ELIQUIS) 2.5 MG tablet Take 1 tablet (2.5 mg) by mouth 2 times daily 180 tablet 3    bisacodyl (DULCOLAX) 5 MG EC tablet Two days prior to exam take two (2) tablets at 4pm. One day prior to exam take two (2) tablets at 4pm 4 tablet 0    budesonide (PULMICORT) 0.5 MG/2ML neb solution Take 2 mLs (0.5 mg) by nebulization 2 times daily 2 mL 3    clopidogrel (PLAVIX) 75 MG tablet Take 1 tablet (75 mg) by mouth daily 90 tablet 1    ferrous sulfate (FEROSUL) 325 (65 Fe) MG tablet Take 1 tablet (325 mg) by mouth daily (with breakfast) 30 tablet 0    fluticasone-salmeterol (ADVAIR) 250-50 MCG/ACT inhaler INHALE 1 DOSE BY MOUTH TWICE DAILY 60 each 8    furosemide (LASIX) 40 MG tablet Take 1 tablet (40 mg) by mouth daily May take an additional 20 mg at noon as needed for swelling. 90 tablet 2    isosorbide mononitrate (ISMO/MONOKET) 20 MG tablet Take 3 tablets (60 mg) by mouth 2 times daily 180 tablet 3    methocarbamol (ROBAXIN) 500 MG tablet  Take 1 tablet (500 mg) by mouth every 6 hours as needed for muscle spasms 30 tablet 0    metoprolol tartrate (LOPRESSOR) 25 MG tablet Take 2 tablets (50 mg) by mouth 2 times daily 180 tablet 3    Multiple Vitamins-Minerals (PRESERVISION AREDS 2+MULTI VIT PO) Take 1 capsule by mouth 2 times daily      omeprazole (PRILOSEC) 40 MG DR capsule Take 1 capsule (40 mg) by mouth daily 90 capsule 3    polyethylene glycol (GOLYTELY) 236 g suspension Two days before procedure at 5PM fill first container with water. Mix and drink an 8 oz glass every 10-15 minutes until HALF of the container is gone. Place the remainder in the refrigerator. One day before procedure at 5PM drink second half of bowel prep. Drink an 8 oz glass every 10-15 minutes until it is gone. Day of procedure 6 hours before arrival time fill the 2nd container with water. Mix and drink an 8 oz glass every 10-15 minutes until HALF of the container is gone. Discard the remaining solution. 8000 mL 0    potassium chloride ER (K-TAB) 20 MEQ CR tablet Take 1 tablet (20 mEq) by mouth daily 90 tablet 3    rosuvastatin (CRESTOR) 20 MG tablet Take 1 tablet (20 mg) by mouth daily for 360 days 90 tablet 3    sodium bicarbonate 650 MG tablet Take 1 tablet (650 mg) by mouth 2 times daily 60 tablet 11    timolol maleate (TIMOPTIC) 0.5 % ophthalmic solution INSTILL 1 DROP INTO EACH EYE ONCE DAILY IN THE MORNING      vitamin B-12 (CYANOCOBALAMIN) 500 MCG tablet Take 1 tablet by mouth daily      vitamin D3 25 mcg (1000 units) tablet Take 1 tablet (25 mcg) by mouth every other day 90 tablet 3             Review of Systems:     A complete review of systems was performed and is negative except as noted in the HPI           Physical Exam:   Temp: 98.4  F (36.9  C) Temp src: Axillary BP: 123/74 Pulse: 74   Resp: 16 SpO2: 98 % O2 Device: None (Room air)    Wt:   Wt Readings from Last 2 Encounters:   07/03/24 65.8 kg (145 lb)   06/28/24 65.8 kg (145 lb)        General: 89 year old  female lying in bed in NAD. Appears comfortable.  Answers appropriately.    HEENT: Head is AT/NC. Sclera anicteric. +medial right periorbital ecchymoses. +eye glasses.  Lungs: No increased work of breathing, speaking in full sentences, equal chest rise. On Room Air.   Heart: Regular rate. Peripheral perfusion intact.  Abdomen: Soft, non-tender, non-distended. No peritoneal signs.  Extremities: WWP.  Musculoskeletal: No gross deformity.  Skin: No jaundice or rash on exposed skin.   Neurologic: Grossly non-focal.  CN 2-12 grossly intact.   Mental status/Psych: A&O. Asks/answers questions appropriately. Pleasant, interactive.             Data:   LAB WORK:    BMP  Recent Labs   Lab 07/03/24  1326 06/28/24  1813    140   POTASSIUM 3.8 4.3   CHLORIDE 107 110*   ALEXANDRO 9.1 8.7*   CO2 21* 17*   BUN 71.6* 85.0*   CR 2.00* 1.88*   * 105*     CBC  Recent Labs   Lab 07/03/24  1506 07/03/24  1326 06/28/24  2249 06/28/24  1813   WBC 8.6 6.9  --  6.4   RBC 2.34* 2.30*  --  2.46*   HGB 7.1* 6.9* 8.2* 7.5*   HCT 22.7* 21.8* 26.0* 25.0*   MCV 97 95  --  102*   MCH 30.3 30.0  --  30.5   MCHC 31.3* 31.7  --  30.0*   RDW 18.3* 18.1*  --  16.6*   PLT 92* 84*  --  65*     INR  Recent Labs   Lab 07/03/24  1326 06/28/24  1813   INR 1.54* 1.49*     LFTs  Recent Labs   Lab 07/03/24  1326 06/28/24  1813   ALKPHOS 50 58   AST 31 27   ALT 16 9   BILITOTAL 0.3 0.3   PROTTOTAL 6.1* 6.1*   ALBUMIN 3.7 3.7      PANC  Recent Labs   Lab 07/03/24  1326   LIPASE 170*       IMAGING:    Pending CTA Abdomen/Pelvis with Contrast:  RESIDENT PRELIMINARY INTERPRETATION  IMPRESSION:   1.  No evidence for acute GI bleed.  2.  Asymmetrically atrophic left kidney with Bosniak 2F equivalent  cyst. Consider nonemergent renal ultrasound for further evaluation.  3.  Pancreatic hypodensities measuring up to 0.9 cm, likely  representing IPMN. Consider follow-up as described below.  4.  Sub-6 mm pulmonary nodules. If patient is high-risk, consider  follow-up  chest CT in 12 months for further evaluation. Otherwise no  follow-up is required.  5.  Colonic diverticulosis.  6.  IVC filter in place.        =======================================================================

## 2024-07-03 NOTE — ED PROVIDER NOTES
ED Provider Note  [facility]      History     Chief Complaint   Patient presents with    Nausea, Vomiting, & Diarrhea    Generalized Weakness    Abdominal Pain    Shortness of Breath     HPI  Tessa Mansfield is a 89 year old female who has a past medical history significant for CAD with multiple stents on Plavix, sick sinus syndrome with pacemaker, paroxysmal A-fib on Eliquis, CKD, hypertension presenting to the emergency department with increasing fatigue and shortness of breath.  Endorsing melena for the last 4 days.  Nausea vomiting and diarrhea.  Recent SBO, history of exploratory laparotomies.  Thought to have some oozing AVMs.    Patient was planned for outpatient workup with endoscopies.  Started holding Plavix today.  Symptoms worsening so she return to the ED.  Was seen in the ER about 5 days ago with a hemoglobin of 7.9.        Past Medical History  Past Medical History:   Diagnosis Date    CAD (coronary artery disease)     CAD s/p PCI+BMS to MRCA 10/2002, PCI to mLCX 2/2003, PCI+DESx2 to pLAD 11/2007, PCI+DESx1 to dRCA 12/2007, PCI+ALVAREZ to RPDA 7/2013; on indefinite DAPT  10/27/2002    10/27/2002: AMI s/p PCI+BMS (3.5x18mm Bx velocity) to mRCA 2/5/2003: Back pain; PCI+stent to mLCX c/b distal embolization/slow flow 4/11/2003: Unstable angina; PCI+stent (Hepacoat velocity) to mLAD 11/27/2007: PCI+DESx2 to pLAD (IVUS w/ calcification of LMCA and ulcerated plaque pLAD, 80% dRCA; also had 80% dLCX, too small to stent) 12/11/2007: Staged PCI. PCI+DESx1 (3.5x13mm Cypher) to dRCA (indefinite DAPT recommended at this time) 6/27/2008: Angina. mLCX stent 70% ISR. LAD and RCA stents patent. Myocardium at risk from LCX felt to be small, medical management preferred to PCI. 11/10/2009: Angina. Findings unchanged from 6/27/2008, FFR LAD 0.90. Medical management recommended. 7/23/2013: Unstable angina; PCI+ALVAREZ to mPDA. Diagnostic findings: LMCA 40% distal. LAD: pLAD stents patent mLAD 30% ISR  dLAD 50% diffuse, D2 diffuse disease. LCX 80% mid ISR. RCA diffuse <30% mid, RPLAs diffuse disease, RPDA 100% occlusion.      Cardiac Pacemaker- [employer]tr[employer], dual chamber- NOT dependant 11/28/2007    CKD (chronic kidney disease), stage IV (H)     Hyperlipidemia LDL goal <70     Hypertension     Stented coronary artery 10-    RCA    Stented coronary artery 2-5-2003    LCx    Stented coronary artery 4-    LAD    Stented coronary artery 11-    LAD    Stented coronary artery 12-    RCA    Transient complete heart block (H) 11/28/2007     Past Surgical History:   Procedure Laterality Date    CHOLECYSTECTOMY      CV CORONARY ANGIOGRAM N/A 6/17/2022    Procedure: Coronary Angiogram;  Surgeon: Constantine Macias MD;  Location: [facility] CARDIAC CATH LAB    CV CORONARY ANGIOGRAM N/A 7/17/2023    Procedure: Coronary Angiogram;  Surgeon: Constantine Macias MD;  Location: [facility] CARDIAC CATH LAB    CV PCI N/A 6/17/2022    Procedure: Percutaneous Coronary Intervention;  Surgeon: Constantine Macias MD;  Location: [facility] CARDIAC CATH LAB    CV PCI N/A 7/17/2023    Procedure: Percutaneous Coronary Intervention;  Surgeon: Constantine Macias MD;  Location: [facility] CARDIAC CATH LAB    CV PCI N/A 11/6/2023    Procedure: Percutaneous Coronary Intervention;  Surgeon: Constantine Macias MD;  Location: [facility] CARDIAC CATH LAB    CV RIGHT HEART CATH MEASUREMENTS RECORDED N/A 6/17/2022    Procedure: Right Heart Catheterization;  Surgeon: Constantine Macias MD;  Location: [facility] CARDIAC CATH LAB    EP PACEMAKER N/A 10/15/2019    Procedure: EP PACEMAKER;  Surgeon: Anthony Zhu MD;  Location: [facility] CARDIAC CATH LAB    ESOPHAGOSCOPY, GASTROSCOPY, DUODENOSCOPY (EGD), COMBINED N/A 7/20/2023    Procedure: Esophagoscopy, gastroscopy, duodenoscopy (EGD), combined;  Surgeon: Bekha Dash DO;  Location:  GI    ESOPHAGOSCOPY, GASTROSCOPY, DUODENOSCOPY (EGD), COMBINED N/A  5/2/2024    Procedure: Esophagoscopy, gastroscopy, duodenoscopy (EGD), combined;  Surgeon: Tavo Donaldson MD;  Location: [facility] GI    HC PPM INSERTION NEW/REPLACEMENT W/ ATRIAL&VENTRICULAR LEAD  11-    HYSTERECTOMY      LAPAROTOMY EXPLORATORY N/A 4/13/2024    Procedure: Laparotomy exploratory, Wound Vac Placement.;  Surgeon: Anthony Trammell MD;  Location: [facility] OR    LAPAROTOMY EXPLORATORY N/A 4/14/2024    Procedure: Abdominal Re-Exploration and Closure;  Surgeon: Anthony Trammell MD;  Location: [facility] OR    LAPAROTOMY EXPLORATORY N/A 4/13/2024    Procedure: Exploratory Laparotomy;  Surgeon: Anthony Trammell MD;  Location: [facility] OR     acetaminophen (TYLENOL) 325 MG tablet  allopurinol (ZYLOPRIM) 100 MG tablet  amLODIPine (NORVASC) 10 MG tablet  apixaban ANTICOAGULANT (ELIQUIS) 2.5 MG tablet  bisacodyl (DULCOLAX) 5 MG EC tablet  budesonide (PULMICORT) 0.5 MG/2ML neb solution  clopidogrel (PLAVIX) 75 MG tablet  ferrous sulfate (FEROSUL) 325 (65 Fe) MG tablet  fluticasone-salmeterol (ADVAIR) 250-50 MCG/ACT inhaler  furosemide (LASIX) 40 MG tablet  isosorbide mononitrate (ISMO/MONOKET) 20 MG tablet  methocarbamol (ROBAXIN) 500 MG tablet  metoprolol tartrate (LOPRESSOR) 25 MG tablet  Multiple Vitamins-Minerals (PRESERVISION AREDS 2+MULTI VIT PO)  omeprazole (PRILOSEC) 40 MG DR capsule  polyethylene glycol (GOLYTELY) 236 g suspension  potassium chloride ER (K-TAB) 20 MEQ CR tablet  rosuvastatin (CRESTOR) 20 MG tablet  sodium bicarbonate 650 MG tablet  timolol maleate (TIMOPTIC) 0.5 % ophthalmic solution  vitamin B-12 (CYANOCOBALAMIN) 500 MCG tablet  vitamin D3 25 mcg (1000 units) tablet      Allergies   Allergen Reactions    Codeine Sulfate Itching    Shrimp Swelling    Bactrim [Sulfamethoxazole W/Trimethoprim]      Patient unable to recall    Biaxin [Clarithromycin]     Chlorthalidone Nausea and Vomiting    Clonidine     Darvon [Propoxyphene Hcl]     Dilaudid [Hydromorphone] Visual Disturbance and  "Hallucination     Tolerated in April 2024 hospital admissions    Gabapentin Fatigue and Confusion     \"felt drunk\"    Indomethacin     Levaquin [Levofloxacin Hemihydrate]     Morphine Sulfate     Percocet [Oxycodone-Acetaminophen] Hallucination    Pregabalin Itching and Fatigue    Simvastatin Palpitations     Muscle weakness, leg cramping      Spironolactone      Dehydrated      Terazosin      Family History  Family History   Problem Relation Age of Onset    Cancer Sister 82        bladder cancer     Social History   Social History     Tobacco Use    Smoking status: Former     Current packs/day: 0.30     Average packs/day: 0.3 packs/day for 15.0 years (4.5 ttl pk-yrs)     Types: Cigarettes    Smokeless tobacco: Never    Tobacco comments:     Quit 35+ years ago   Vaping Use    Vaping status: Never Used   Substance Use Topics    Drug use: No      Past medical history, past surgical history, medications, allergies, family history, and social history were reviewed with the patient. No additional pertinent items.   A medically appropriate review of systems was performed with pertinent positives and negatives noted in the HPI, and all other systems negative.    Physical Exam   BP: 112/59  Pulse: 64  Temp: 97.5  F (36.4  C)  Resp: 16  Height: 162.6 cm (5' 4\")  Weight: 65.8 kg (145 lb)  SpO2: 100 %  Physical Exam  Vital Signs Reviewed  Gen: Well nourished, well developed, uncomfortable appearing. Non-toxic.  HEENT: NC/AT, PERRL, EOMI, MMM. Pale conjunctiva  Neck: Supple, FROM  CV: Regular Rate, no murmur/rub/gallop  Lungs/Chest: Normal Effort, CTAB  Abd: Non-distended, mild tenderness with no rigidity rebound or guarding most notable in the upper quadrants and epigastrium  MSK/Back: FROM, no visible deformity  Neuro: A&Ox3, GCS 15, CN II-XII unremarkable  Skin: Warm, Dry, Intact, no visible lesions    ED Course, Procedures, & Data      Procedures            EKG Interpretation:      Interpreted by Olvin Faye MD  Time " reviewed: 1234  Symptoms at time of EKG: Dyspnea   Rhythm: paced  Rate: Normal  Axis: Normal  Ectopy: none  Conduction: normal  ST Segments/ T Waves: Non-specific ST-T wave changes  Q Waves: none  Comparison to prior: Unchanged    Clinical Impression: AV paced, no FRITZ               Results for orders placed or performed during the hospital encounter of 07/03/24   CTA Abdomen Pelvis with Contrast     Status: None    Narrative    EXAMINATION: CTA ABDOMEN PELVIS WITH CONTRAST, 7/3/2024 4:23 PM    INDICATION: GI bleed, hx AVMs, elevated lipase, abd pain, N/V/D,  melena    COMPARISON STUDY: CT AP 5/1/2024    TECHNIQUE: CT scan of the abdomen and pelvis was performed on  multidetector CT scanner using volumetric acquisition technique, and  images were reconstructed in multiple planes with variable thickness  and reviewed on dedicated workstations. CT scan radiation dose is  optimized to minimum requisite dose using automated dose modulation  techniques.    CONTRAST: iopamidol (ISOVUE-370) solution 89 mL.     FINDINGS:  Partially visualized cardiac leads.    Lower Chest:   Bibasilar linear atelectasis and reticular fibrosis.    Right lower lobe peripheral pulmonary nodule measuring 0.3 x 0.4 cm  (series 10, image 29).    Abdomen and Pelvis:  Liver: No mass. No intrahepatic biliary ductal dilation.    Biliary System: Cholecystectomy. Common bile duct measures 0.6 cm.    Pancreas: Atrophic pancreas. Hypodensity at the head measuring 0.7 x  0.9 cm (series 10, image 132). Another hypodensity at the pancreatic  tail measuring 0.6 x 0.4 cm (image 107).     Adrenal glands: Unremarkable.    Spleen: Splenic hypodensity too small to characterize.    Kidneys/Ureters/Bladder: Asymmetrically atrophic left kidney. No  hydronephrosis. Left mid kidney lesion measuring 1.9 x 1.9 cm with  noncontrast attenuation ranging from 6-81 HU but does not  significantly enhance (series 6, image 107).    Pelvis: Metallic artifact obscures the pelvis.  Pelvic phleboliths.  Hysterectomy.    Gastrointestinal tract: Normal caliber small and large bowel.  Unremarkable appendix. Hyperdense round substance within the stomach  that does not change between phases (series 7, image 129). No  convincing evidence for acute GI bleeding.    Mesentery/peritoneum/retroperitoneum: No mass. No free fluid or air.    Lymph nodes: No significant lymphadenopathy.    Vasculature: IVC filter. No IVC clot. No portal venous thrombus.  Scattered calcified and soft plaques of the abdominal aorta. Irregular  appearance of the left renal artery at its takeoff (series 5, image  37).    Osseous structures: Bilateral hip instrumentation.      Soft tissues: Ventral post surgical soft tissue changes.        Impression    IMPRESSION:   1.  No evidence for acute GI bleed.  2.  Asymmetrically atrophic left kidney with Bosniak 2F equivalent  cyst. Consider nonemergent renal ultrasound for further evaluation.  3.  Pancreatic hypodensities measuring up to 0.9 cm, likely  representing IPMN. Consider follow-up as described below.  4.  Sub-6 mm pulmonary nodules. If patient is high-risk, consider  follow-up chest CT in 12 months for further evaluation. Otherwise no  follow-up is required.  5.  Colonic diverticulosis.  6.  IVC filter in place.    International evidence-based [place] guidelines for the management of  intraductal papillary mucinous neoplasm of the pancreas:   Surveillance is recommended if no high risk stigmata or worrisome  features are present:  Primary imaging modalities recommended are MRI/MRCP and MDCT  Size of largest cyst:   *  Less than 20 mm: Follow-up imaging in 6 months once, then every 18  months if no change. Stop surveillance if stable for 5 years.  *  More than 20 mm but less than 30 mm: Follow-up imaging at 6 months,  12 months, then yearly if no change.   *  More than 30 mm- follow up imaging every 6 months.    GI consultation for surgery/endoscopic ultrasound is recommended  "for  cysts with \"high-risk stigmata\" of high grade dysplasia or invasive  carcinoma such as:  1. Obstructive jaundice in a patient with cystic lesion of the head of  the pancreas  2. Enhancing mural nodule > 5mm or solid component  3. Main pancreatic duct >10mm  4. Suspicious or positive results of cytology    If worrisome features are present, GI consultation is recommended.  Worrisome features on imagin. Cyst more than or equal to 30 mm  2. Thickened or enhancing cyst walls  3. Main pancreatic duct > 5mm and < 10mm.  4. Abrupt change in caliber of pancreatic duct with distal atrophy  5. Lymphadenopathy  6. Cyst growth rate > 2.5mm/year    *Reference: International evidence-based [place] guidelines for the  management of  intraductal papillary mucinous neoplasm of the pancreas Pancreatology:  24:(); 255-270.  https://doi.org/10.1016/j.graves.2023.12.009    I have personally reviewed the examination and initial interpretation  and I agree with the findings.    AYE AGUIRRE MD         SYSTEM ID:  [id number]   INR     Status: Abnormal   Result Value Ref Range    INR 1.54 (H) 0.85 - 1.15   Partial thromboplastin time     Status: Normal   Result Value Ref Range    aPTT 36 22 - 38 Seconds   Basic metabolic panel     Status: Abnormal   Result Value Ref Range    Sodium 141 135 - 145 mmol/L    Potassium 3.8 3.4 - 5.3 mmol/L    Chloride 107 98 - 107 mmol/L    Carbon Dioxide (CO2) 21 (L) 22 - 29 mmol/L    Anion Gap 13 7 - 15 mmol/L    Urea Nitrogen 71.6 (H) 8.0 - 23.0 mg/dL    Creatinine 2.00 (H) 0.51 - 0.95 mg/dL    GFR Estimate 24 (L) >60 mL/min/1.73m2    Calcium 9.1 8.8 - 10.2 mg/dL    Glucose 107 (H) 70 - 99 mg/dL   Hepatic function panel     Status: Abnormal   Result Value Ref Range    Protein Total 6.1 (L) 6.4 - 8.3 g/dL    Albumin 3.7 3.5 - 5.2 g/dL    Bilirubin Total 0.3 <=1.2 mg/dL    Alkaline Phosphatase 50 40 - 150 U/L    AST 31 0 - 45 U/L    ALT 16 0 - 50 U/L    Bilirubin Direct <0.20 0.00 - 0.30 mg/dL "   Lipase     Status: Abnormal   Result Value Ref Range    Lipase 170 (H) 13 - 60 U/L   Lactic acid whole blood with 1x repeat in 2 hr when >2     Status: Abnormal   Result Value Ref Range    Lactic Acid, Initial 2.4 (H) 0.7 - 2.0 mmol/L   Troponin T, High Sensitivity     Status: Abnormal   Result Value Ref Range    Troponin T, High Sensitivity 33 (H) <=14 ng/L   Nt probnp inpatient (BNP)     Status: Abnormal   Result Value Ref Range    N terminal Pro BNP Inpatient 2,153 (H) 0 - 1,800 pg/mL   Blood gas venous     Status: Abnormal   Result Value Ref Range    pH Venous 7.41 7.32 - 7.43    pCO2 Venous 35 (L) 40 - 50 mm Hg    pO2 Venous 18 (L) 25 - 47 mm Hg    Bicarbonate Venous 22 21 - 28 mmol/L    Base Excess/Deficit Venous -2.4 -3.0 - 3.0 mmol/L    FIO2 21     Oxyhemoglobin Venous 23 (L) 70 - 75 %    O2 Sat, Venous 23.5 (L) 70.0 - 75.0 %    Narrative    In healthy individuals, oxyhemoglobin (O2Hb) and oxygen saturation (SO2) are approximately equal. In the presence of dyshemoglobins, oxyhemoglobin can be considerably lower than oxygen saturation.   CBC with platelets and differential     Status: Abnormal   Result Value Ref Range    WBC Count 6.9 4.0 - 11.0 10e3/uL    RBC Count 2.30 (L) 3.80 - 5.20 10e6/uL    Hemoglobin 6.9 (LL) 11.7 - 15.7 g/dL    Hematocrit 21.8 (L) 35.0 - 47.0 %    MCV 95 78 - 100 fL    MCH 30.0 26.5 - 33.0 pg    MCHC 31.7 31.5 - 36.5 g/dL    RDW 18.1 (H) 10.0 - 15.0 %    Platelet Count 84 (L) 150 - 450 10e3/uL    % Neutrophils      % Lymphocytes      % Monocytes      % Eosinophils      % Basophils      % Immature Granulocytes      NRBCs per 100 WBC 0 <1 /100    Absolute Neutrophils      Absolute Lymphocytes      Absolute Monocytes      Absolute Eosinophils      Absolute Basophils      Absolute Immature Granulocytes      Absolute NRBCs 0.0 10e3/uL   Manual Differential     Status: Abnormal   Result Value Ref Range    % Neutrophils 63 %    % Lymphocytes 16 %    % Monocytes 19 %    % Eosinophils 0 %     % Basophils 2 %    NRBCs per 100 WBC 2 (H) <=0 %    Absolute Neutrophils 4.3 1.6 - 8.3 10e3/uL    Absolute Lymphocytes 1.1 0.8 - 5.3 10e3/uL    Absolute Monocytes 1.3 0.0 - 1.3 10e3/uL    Absolute Eosinophils 0.0 0.0 - 0.7 10e3/uL    Absolute Basophils 0.1 0.0 - 0.2 10e3/uL    Absolute NRBCs 0.1 (H) <=0.0 10e3/uL    RBC Morphology Confirmed RBC Indices     Platelet Assessment  Automated Count Confirmed. Platelet morphology is normal.     Automated Count Confirmed. Platelet morphology is normal.    Polychromasia Slight (A) None Seen   Lactic acid whole blood     Status: Abnormal   Result Value Ref Range    Lactic Acid 2.6 (H) 0.7 - 2.0 mmol/L   CBC with platelets and differential     Status: Abnormal   Result Value Ref Range    WBC Count 8.6 4.0 - 11.0 10e3/uL    RBC Count 2.34 (L) 3.80 - 5.20 10e6/uL    Hemoglobin 7.1 (L) 11.7 - 15.7 g/dL    Hematocrit 22.7 (L) 35.0 - 47.0 %    MCV 97 78 - 100 fL    MCH 30.3 26.5 - 33.0 pg    MCHC 31.3 (L) 31.5 - 36.5 g/dL    RDW 18.3 (H) 10.0 - 15.0 %    Platelet Count 92 (L) 150 - 450 10e3/uL    % Neutrophils      % Lymphocytes      % Monocytes      % Eosinophils      % Basophils      % Immature Granulocytes      NRBCs per 100 WBC 1 (H) <1 /100    Absolute Neutrophils      Absolute Lymphocytes      Absolute Monocytes      Absolute Eosinophils      Absolute Basophils      Absolute Immature Granulocytes      Absolute NRBCs 0.0 10e3/uL   Reticulocyte count     Status: Abnormal   Result Value Ref Range    % Reticulocyte 5.9 (H) 0.5 - 2.0 %    Absolute Reticulocyte 0.138 (H) 0.025 - 0.095 10e6/uL   Manual Differential     Status: Abnormal   Result Value Ref Range    % Neutrophils 72 %    % Lymphocytes 21 %    % Monocytes 7 %    % Eosinophils 0 %    % Basophils 0 %    Absolute Neutrophils 6.2 1.6 - 8.3 10e3/uL    Absolute Lymphocytes 1.8 0.8 - 5.3 10e3/uL    Absolute Monocytes 0.6 0.0 - 1.3 10e3/uL    Absolute Eosinophils 0.0 0.0 - 0.7 10e3/uL    Absolute Basophils 0.0 0.0 - 0.2  10e3/uL    RBC Morphology Confirmed RBC Indices     Platelet Assessment  Automated Count Confirmed. Platelet morphology is normal.     Automated Count Confirmed. Platelet morphology is normal.    Polychromasia Slight (A) None Seen   EKG 12-lead, tracing only     Status: None   Result Value Ref Range    Systolic Blood Pressure  mmHg    Diastolic Blood Pressure  mmHg    Ventricular Rate 65 BPM    Atrial Rate 65 BPM    VA Interval 216 ms    QRS Duration 82 ms     ms    QTc 422 ms    P Axis 91 degrees    R AXIS 23 degrees    T Axis 44 degrees    Interpretation ECG       Atrial-paced rhythm with prolonged AV conduction  Abnormal ECG  Unconfirmed report - interpretation of this ECG is computer generated - see medical record for final interpretation  Confirmed by - EMERGENCY ROOM, PHYSICIAN ([id number]),  RENEE DOMINGUEZ ([id number]) on 7/3/2024 12:55:57 PM     Adult Type and Screen     Status: None   Result Value Ref Range    ABO/RH(D) O NEG     Antibody Screen Negative Negative    SPECIMEN EXPIRATION DATE [id number]    Prepare red blood cells (unit)     Status: None   Result Value Ref Range    Blood Component Type Red Blood Cells     Product Code V3208S81     Unit Status Transfused     Unit Number [id number]     CROSSMATCH Compatible     CODING SYSTEM CFOR826     ISSUE DATE AND TIME [id number]     UNIT ABO/RH O-     UNIT TYPE ISBT 9500    CBC with platelets differential     Status: Abnormal    Narrative    The following orders were created for panel order CBC with platelets differential.  Procedure                               Abnormality         Status                     ---------                               -----------         ------                     CBC with platelets and d...[[id number]]  Abnormal            Final result               Manual Differential[[id number]]          Abnormal            Final result                 Please view results for these tests on the individual orders.   ABO/Rh type  and screen     Status: None    Narrative    The following orders were created for panel order ABO/Rh type and screen.  Procedure                               Abnormality         Status                     ---------                               -----------         ------                     Adult Type and Screen[[id number]]                            Final result                 Please view results for these tests on the individual orders.   Lab Blood Morphology Pathologist Review     Status: Abnormal (In process)    Narrative    The following orders were created for panel order Lab Blood Morphology Pathologist Review.  Procedure                               Abnormality         Status                     ---------                               -----------         ------                     Bld morphology pathology...[[id number]]                      In process                 CBC with platelets and d...[[id number]]  Abnormal            Final result               Manual Differential[[id number]]          Abnormal            Final result               Reticulocyte count[[id number]]           Abnormal            Final result               Morphology Tracking[[id number]]                              Final result                 Please view results for these tests on the individual orders.     Medications   ondansetron (ZOFRAN) injection 4 mg (has no administration in time range)   allopurinol (ZYLOPRIM) tablet 100 mg (has no administration in time range)   amLODIPine (NORVASC) tablet 10 mg (has no administration in time range)   apixaban ANTICOAGULANT (ELIQUIS) tablet 2.5 mg ( Oral Automatically Held 7/6/24 2000)   clopidogrel (PLAVIX) tablet 75 mg ( Oral Automatically Held 7/6/24 0800)   furosemide (LASIX) tablet 40 mg (has no administration in time range)   isosorbide mononitrate (ISMO/MONOKET) tablet 60 mg (60 mg Oral $Given 7/3/24 2056)   methocarbamol (ROBAXIN) tablet 500 mg (has no administration in time range)    pantoprazole (PROTONIX) EC tablet 40 mg (has no administration in time range)   rosuvastatin (CRESTOR) tablet 20 mg (has no administration in time range)   potassium chloride bharathi ER (KLOR-CON M10) CR tablet 20 mEq (has no administration in time range)   timolol maleate (TIMOPTIC) 0.5 % ophthalmic solution 1 drop (has no administration in time range)   sodium bicarbonate tablet 650 mg (has no administration in time range)   metoprolol tartrate (LOPRESSOR) tablet 50 mg (has no administration in time range)   lidocaine 1 % 0.1-1 mL (has no administration in time range)   lidocaine (LMX4) cream (has no administration in time range)   sodium chloride (PF) 0.9% PF flush 3 mL (3 mLs Intracatheter $Given 7/3/24 1841)   sodium chloride (PF) 0.9% PF flush 3 mL (has no administration in time range)   senna-docusate (SENOKOT-S/PERICOLACE) 8.6-50 MG per tablet 1 tablet (has no administration in time range)     Or   senna-docusate (SENOKOT-S/PERICOLACE) 8.6-50 MG per tablet 2 tablet (has no administration in time range)   calcium carbonate (TUMS) chewable tablet 1,000 mg (has no administration in time range)   fluticasone-vilanterol (BREO ELLIPTA) 100-25 MCG/ACT inhaler 1 puff (has no administration in time range)   sodium chloride 0.9% BOLUS 500 mL (0 mLs Intravenous Stopped 7/3/24 1507)   CT saline (80 mLs Intravenous $Given 7/3/24 1553)   iopamidol (ISOVUE-370) solution 89 mL (89 mLs Intravenous $Given 7/3/24 1553)     Labs Ordered and Resulted from Time of ED Arrival to Time of ED Departure   INR - Abnormal       Result Value    INR 1.54 (*)    BASIC METABOLIC PANEL - Abnormal    Sodium 141      Potassium 3.8      Chloride 107      Carbon Dioxide (CO2) 21 (*)     Anion Gap 13      Urea Nitrogen 71.6 (*)     Creatinine 2.00 (*)     GFR Estimate 24 (*)     Calcium 9.1      Glucose 107 (*)    HEPATIC FUNCTION PANEL - Abnormal    Protein Total 6.1 (*)     Albumin 3.7      Bilirubin Total 0.3      Alkaline Phosphatase 50       AST 31      ALT 16      Bilirubin Direct <0.20     LIPASE - Abnormal    Lipase 170 (*)    LACTIC ACID WHOLE BLOOD WITH 1X REPEAT IN 2 HR WHEN >2 - Abnormal    Lactic Acid, Initial 2.4 (*)    TROPONIN T, HIGH SENSITIVITY - Abnormal    Troponin T, High Sensitivity 33 (*)    NT PROBNP INPATIENT - Abnormal    N terminal Pro BNP Inpatient 2,153 (*)    BLOOD GAS VENOUS - Abnormal    pH Venous 7.41      pCO2 Venous 35 (*)     pO2 Venous 18 (*)     Bicarbonate Venous 22      Base Excess/Deficit Venous -2.4      FIO2 21      Oxyhemoglobin Venous 23 (*)     O2 Sat, Venous 23.5 (*)    CBC WITH PLATELETS AND DIFFERENTIAL - Abnormal    WBC Count 6.9      RBC Count 2.30 (*)     Hemoglobin 6.9 (*)     Hematocrit 21.8 (*)     MCV 95      MCH 30.0      MCHC 31.7      RDW 18.1 (*)     Platelet Count 84 (*)     % Neutrophils        % Lymphocytes        % Monocytes        % Eosinophils        % Basophils        % Immature Granulocytes        NRBCs per 100 WBC 0      Absolute Neutrophils        Absolute Lymphocytes        Absolute Monocytes        Absolute Eosinophils        Absolute Basophils        Absolute Immature Granulocytes        Absolute NRBCs 0.0     DIFFERENTIAL - Abnormal    % Neutrophils 63      % Lymphocytes 16      % Monocytes 19      % Eosinophils 0      % Basophils 2      NRBCs per 100 WBC 2 (*)     Absolute Neutrophils 4.3      Absolute Lymphocytes 1.1      Absolute Monocytes 1.3      Absolute Eosinophils 0.0      Absolute Basophils 0.1      Absolute NRBCs 0.1 (*)     RBC Morphology Confirmed RBC Indices      Platelet Assessment        Value: Automated Count Confirmed. Platelet morphology is normal.    Polychromasia Slight (*)    LACTIC ACID WHOLE BLOOD - Abnormal    Lactic Acid 2.6 (*)    CBC WITH PLATELETS AND DIFFERENTIAL - Abnormal    WBC Count 8.6      RBC Count 2.34 (*)     Hemoglobin 7.1 (*)     Hematocrit 22.7 (*)     MCV 97      MCH 30.3      MCHC 31.3 (*)     RDW 18.3 (*)     Platelet Count 92 (*)     %  Neutrophils        % Lymphocytes        % Monocytes        % Eosinophils        % Basophils        % Immature Granulocytes        NRBCs per 100 WBC 1 (*)     Absolute Neutrophils        Absolute Lymphocytes        Absolute Monocytes        Absolute Eosinophils        Absolute Basophils        Absolute Immature Granulocytes        Absolute NRBCs 0.0     RETICULOCYTE COUNT - Abnormal    % Reticulocyte 5.9 (*)     Absolute Reticulocyte 0.138 (*)    DIFFERENTIAL - Abnormal    % Neutrophils 72      % Lymphocytes 21      % Monocytes 7      % Eosinophils 0      % Basophils 0      Absolute Neutrophils 6.2      Absolute Lymphocytes 1.8      Absolute Monocytes 0.6      Absolute Eosinophils 0.0      Absolute Basophils 0.0      RBC Morphology Confirmed RBC Indices      Platelet Assessment        Value: Automated Count Confirmed. Platelet morphology is normal.    Polychromasia Slight (*)    PARTIAL THROMBOPLASTIN TIME - Normal    aPTT 36     MORPHOLOGY TRACKING   TYPE AND SCREEN, ADULT    ABO/RH(D) O NEG      Antibody Screen Negative      SPECIMEN EXPIRATION DATE [id number]     PREPARE RED BLOOD CELLS (UNIT)    Blood Component Type Red Blood Cells      Product Code [id number]      Unit Status Transfused      Unit Number [id number]      CROSSMATCH Compatible      CODING SYSTEM [id number]      ISSUE DATE AND TIME [id number]      UNIT ABO/RH O-      UNIT TYPE ISBT 9500     PREPARE RED BLOOD CELLS (UNIT)   BLOOD MORPHOLOGY PATHOLOGIST REVIEW   TRANSFUSE RED BLOOD CELLS (UNIT)   ABO/RH TYPE AND SCREEN   LAB BLOOD MORPHOLOGY PATHOLOGIST REVIEW     CTA Abdomen Pelvis with Contrast   Final Result   IMPRESSION:    1.  No evidence for acute GI bleed.   2.  Asymmetrically atrophic left kidney with Bosniak 2F equivalent   cyst. Consider nonemergent renal ultrasound for further evaluation.   3.  Pancreatic hypodensities measuring up to 0.9 cm, likely   representing IPMN. Consider follow-up as described below.   4.  Sub-6 mm pulmonary  "nodules. If patient is high-risk, consider   follow-up chest CT in 12 months for further evaluation. Otherwise no   follow-up is required.   5.  Colonic diverticulosis.   6.  IVC filter in place.      International evidence-based [place] guidelines for the management of   intraductal papillary mucinous neoplasm of the pancreas:    Surveillance is recommended if no high risk stigmata or worrisome   features are present:   Primary imaging modalities recommended are MRI/MRCP and MDCT   Size of largest cyst:    *  Less than 20 mm: Follow-up imaging in 6 months once, then every 18   months if no change. Stop surveillance if stable for 5 years.   *  More than 20 mm but less than 30 mm: Follow-up imaging at 6 months,   12 months, then yearly if no change.    *  More than 30 mm- follow up imaging every 6 months.      GI consultation for surgery/endoscopic ultrasound is recommended for   cysts with \"high-risk stigmata\" of high grade dysplasia or invasive   carcinoma such as:   1. Obstructive jaundice in a patient with cystic lesion of the head of   the pancreas   2. Enhancing mural nodule > 5mm or solid component   3. Main pancreatic duct >10mm   4. Suspicious or positive results of cytology      If worrisome features are present, GI consultation is recommended.   Worrisome features on imagin. Cyst more than or equal to 30 mm   2. Thickened or enhancing cyst walls   3. Main pancreatic duct > 5mm and < 10mm.   4. Abrupt change in caliber of pancreatic duct with distal atrophy   5. Lymphadenopathy   6. Cyst growth rate > 2.5mm/year      *Reference: International evidence-based [place] guidelines for the   management of   intraductal papillary mucinous neoplasm of the pancreas Pancreatology:   24:(); 255-270.   [email or web address]/[email or web address]      I have personally reviewed the examination and initial interpretation   and I agree with the findings.      AYE AGUIRRE MD            SYSTEM ID:  [id number]         "     Critical Care Addendum  My initial assessment, based on my review of prehospital provider report, review of nursing observations, review of vital signs, focused history, physical exam, and interpretation of labs , established a high suspicion that Tessa Mansfield has  persistent GI bleeding with severe anemia , which requires immediate intervention, and therefore she is critically ill.     After the initial assessment, the care team initiated multiple lab tests, initiated IV fluid administration, and initiated medication therapy with PRBCs  to provide stabilization care. Due to the critical nature of this patient, I reassessed nursing observations, vital signs, physical exam, mental status, and neurologic status multiple times prior to her disposition.     Time also spent performing documentation, reviewing test results, discussion with consultants, and coordination of care.     Critical care time (excluding teaching time and procedures): 35 minutes.       Assessment & Plan    Tessa Mansfield is a 89 year old female who has a past medical history significant for CAD with multiple stents on Plavix, sick sinus syndrome with pacemaker, paroxysmal A-fib on Eliquis, CKD, hypertension presenting to the emergency department with increasing fatigue and shortness of breath.  On arrival the patient is afebrile with reassuring vital signs.  She appears tired and uncomfortable but is in no acute distress and appears nontoxic.  Patient has a known history of AVMs and gastrointestinal bleeding.  Was actually admitted last month with consults to hematology cardiology and gastroenterology.  Symptoms appear to be progressing.  Will obtain labs including blood counts type and screen.  Given symptoms and presentation concern for progressive GI bleed.  Patient with numerous cardiac risk factors.  Anticipate she will likely need a higher transfusion threshold and 7 due to this.  Anticipated disposition will be admission to the  [facility] in [place] with gastroenterology consult.    The patient remained stable in the emergency department.  Hemoglobin was 6.9 patient was consented for and a transfusion of 1 unit of packed red blood cells was initiated.    The patient had numerous laboratory elevations.  Minimal troponin elevation likely related to anemia and her chronic cardiac disease, low suspicion for ACS.    Interestingly her lipase was elevated to 170.  Possibly with some clinical pancreatitis.  Metabolic panel without significant electrolyte abnormalities.  No leukocytosis.  Has a elevated lactic acid that is persistent despite 500 cc of fluids.  Will need to be rechecked after transfusion.    CT angiography of the abdomen and pelvis was obtained.  Given her persistent bleeding and elevated lactic acidosis as well as recent literature suggesting the safety of IV contrast even in patients with a GFR of less than 30 the benefit of a more sensitive exam for acutely life-threatening pathology outweighs the risk of possible contrast associated nephropathy a contrast enhanced study was performed. This was discussed with on call radiologist prior to protocoling.    CT is notable for no acute GI bleeding.  She does have some kidney asymmetry that was noted with a cyst, nonemergent ultrasound recommended by radiology.  She also has pancreatic hypodensities.  Unfortunately her prior CT scans were done without contrast, possibly due to CKD so unclear if these are new or chronic and not seen without contrast enhancement. Discussed with radiology who believes these may not have been easily seen without contrast but may have previously been seen. Raises concern for possible pancreatic disease needing further evaluation.    Discussed management with GI prior to imaging. Appreciate their input. Will need cardiology consult (defer to admitting team) to discuss risk/benefit of ongoing anticoagulation and antiplatelet therapy as well as transfusion  thresholds.    Patient stable for admission to medicine for continued workup and evaluation.        New Prescriptions    No medications on file       Final diagnoses:   Gastrointestinal hemorrhage, unspecified gastrointestinal hemorrhage type   Symptomatic anemia       Olvin Faye Jr., MD   [facility] EMERGENCY DEPARTMENT  7/3/2024     Olvin Faye MD  07/03/24 2093

## 2024-07-03 NOTE — H&P
[facility]    History and Physical - Hospitalist Service, GOLD TEAM        Date of Admission:  7/3/2024    Assessment & Plan      Tessa Mansfield is a 89 year old female admitted on 7/3/2024.  Medical history notable for recent hospitalization for blood loss anemia, CAD s/p multiple stents, sick sinus syndrome s/p dual-chamber pacemaker, paroxysmal A-fib (on Eliquis), hyperlipidemia, hypertension, SBO s/p ex lap (4/2023), CKD stage IV, cough variant asthma.  Presented to emergency department with concern of fatigue.    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 EGD performed at that time demonstrating possibly an AVM oozing in setting of anticoagulation. Additionally may have possible AVM of right colon vs small bowel. On 7/3 patient presented to the emergency department with concern of fatgue. Reports possible melena (taking oral iron). Hgb upon arrival 6.9. Transfusion initiated. 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   -GI consulted recs appreciated   -Recommend cardiology consult for assessment of anticoagulation  -Recommended establishment with anemia clinic for routine outpatient transfusion  -Not a candidate for endoscopy/colonoscopy  -Hold PTA oral iron in setting possible melena, (noted previous recommendations for IV iron)  -Transfused 1 unit PRBC  -Recheck hemoglobin  -Continue PPI  -Consulted cardiology     #CAD s/p multiple PCI, last stent 11/2023  #Hyperlipidemia  #Hypertension  #HFpEF   #Mild-moderate mitral regurgitation   #Chronic dyspnea  Multiple stents, 2 RCA, 3 LAD, 1 left circumflex, 1 right posterior descending.  Most recent mid LAD in November 2023.  Chronic heart failure with preserved fraction on problem list, last echo LVEF >50%.   Noted by cardiology that dyspnea is patient anginal equivalent and has improved post PCI.  Troponin mildly elevated in ED, noted to have chronic mild elevation.  -Cardiology consulted recs appreciated.  -PTA Plavix held before admission for tentative colonoscopy, continue to hold with current bleeding  -Continue PTA amlodipine  -Continue PTA metoprolol  -Continue PTA isosorbide mononitrate  -Continue PTA furosemide w/ oral potassium replacement    #paroxysmal atrial fibs   #Sick sinus syndrome status post dual-chamber pacemaker  [id number]: 5. Prior to arrival Eliquis.  -Eliquis currently on hold with current presumed bleed  -Cardiology consulted    #CKD stage IV  Baseline creatinine 1.5-2.0.  Creatinine on admission 2.0  -Continue PTA sodium bicarb  -Avoid nephrotoxic medications  -BMP in a.m.    #Recent weight loss  Reports 10 to 20 pound weight loss.  Some variance in weight throughout multiple visits over the few months.  Noted to be 162 pounds in April.  Currently 145 lbs.  Patient reports decreased oral intake.  No clear etiology.  -Nutrition consult  -Follow-up with PCP if weight loss continues    #Glaucoma  -Continue PTA timolol eyedrops    #Cough variant asthma   No acute exacerbation.   -PTA inhaler     Diet: Regular adult  DVT Prophylaxis: Pneumatic Compression Devices  Bonner Catheter: Not present  Lines: None     Cardiac Monitoring: None  Code Status:  Full    Clinically Significant Risk Factors Present on Admission               # Drug Induced Coagulation Defect: home medication list includes an anticoagulant medication  # Drug Induced Platelet Defect: home medication list includes an antiplatelet medication   # Hypertension: Noted on problem list  # Chronic heart failure with preserved ejection fraction: heart failure noted on problem list and last echo with EF >50%   # Anemia: based on hgb <11  # Anemia: based on hgb <11               # Financial/Environmental Concerns:     # Pacemaker present        Disposition Plan     Medically Ready for Discharge: Anticipated in 2-4 Days         The patient's care was discussed with the .  Attending Physician, Dr. Mario Alberto Mcgvoern  Hospitalist Service, [facility]  Securely message with [employer] (more info)  Text page via [facility] Paging/Directory   See signed in provider for up to date coverage information    ______________________________________________________________________    Chief Complaint   Fatigue, headache    History is obtained from the patient    History of Present Illness   Tessa Mansfield is a 89 year old female admitted on 7/3/2024.  Medical history notable for recent hospitalization for blood loss anemia, CAD s/p multiple stents, sick sinus syndrome s/p dual-chamber pacemaker, paroxysmal A-fib (on Eliquis), hyperlipidemia, hypertension, SBO s/p ex lap (4/2023), CKD stage IV, cough variant asthma. Presented to emergency department with concern of fatigue.  Reports shortness of breath with activity.  Reports dark-colored stool but is currently on oral iron supplement.  Had a tentative plan to get a colonoscopy, however reports no longer is going to pursue after just speaking with GI.  Plavix was held this morning in anticipation of colonoscopy.  He reports a weight loss with decreased appetite last few months.  Reports bilateral temporal throbbing headache prior to arrival which is since resolved. Patient states that they do not typically get headache.    Past Medical History    Past Medical History:   Diagnosis Date    CAD (coronary artery disease)     CAD s/p PCI+BMS to MRCA 10/2002, PCI to mLCX 2/2003, PCI+DESx2 to pLAD 11/2007, PCI+DESx1 to dRCA 12/2007, PCI+ALVAREZ to RPDA 7/2013; on indefinite DAPT  10/27/2002    10/27/2002: AMI s/p PCI+BMS (3.5x18mm Bx velocity) to mRCA 2/5/2003: Back pain; PCI+stent to mLCX c/b distal embolization/slow flow 4/11/2003: Unstable angina; PCI+stent  (Hepacoat velocity) to mLAD 11/27/2007: PCI+DESx2 to pLAD (IVUS w/ calcification of LMCA and ulcerated plaque pLAD, 80% dRCA; also had 80% dLCX, too small to stent) 12/11/2007: Staged PCI. PCI+DESx1 (3.5x13mm Cypher) to dRCA (indefinite DAPT recommended at this time) 6/27/2008: Angina. mLCX stent 70% ISR. LAD and RCA stents patent. Myocardium at risk from LCX felt to be small, medical management preferred to PCI. 11/10/2009: Angina. Findings unchanged from 6/27/2008, FFR LAD 0.90. Medical management recommended. 7/23/2013: Unstable angina; PCI+ALVAREZ to mPDA. Diagnostic findings: LMCA 40% distal. LAD: pLAD stents patent mLAD 30% ISR dLAD 50% diffuse, D2 diffuse disease. LCX 80% mid ISR. RCA diffuse <30% mid, RPLAs diffuse disease, RPDA 100% occlusion.      Cardiac Pacemaker- [employer], dual chamber- NOT dependant 11/28/2007    CKD (chronic kidney disease), stage IV (H)     Hyperlipidemia LDL goal <70     Hypertension     Stented coronary artery 10-    RCA    Stented coronary artery 2-5-2003    LCx    Stented coronary artery 4-    LAD    Stented coronary artery 11-    LAD    Stented coronary artery 12-    RCA    Transient complete heart block (H) 11/28/2007       Past Surgical History   Past Surgical History:   Procedure Laterality Date    CHOLECYSTECTOMY      CV CORONARY ANGIOGRAM N/A 6/17/2022    Procedure: Coronary Angiogram;  Surgeon: Constantine Macias MD;  Location: [facility] CARDIAC CATH LAB    CV CORONARY ANGIOGRAM N/A 7/17/2023    Procedure: Coronary Angiogram;  Surgeon: Constantine Macias MD;  Location: [facility] CARDIAC CATH LAB    CV PCI N/A 6/17/2022    Procedure: Percutaneous Coronary Intervention;  Surgeon: Constantine Macias MD;  Location: [facility] CARDIAC CATH LAB    CV PCI N/A 7/17/2023    Procedure: Percutaneous Coronary Intervention;  Surgeon: Constantine Macias MD;  Location: [facility] CARDIAC CATH LAB    CV PCI N/A 11/6/2023    Procedure: Percutaneous  Coronary Intervention;  Surgeon: Constantine Macias MD;  Location:  HEART CARDIAC CATH LAB    CV RIGHT HEART CATH MEASUREMENTS RECORDED N/A 6/17/2022    Procedure: Right Heart Catheterization;  Surgeon: Constantine Macias MD;  Location:  HEART CARDIAC CATH LAB    EP PACEMAKER N/A 10/15/2019    Procedure: EP PACEMAKER;  Surgeon: Anthony Zhu MD;  Location:  HEART CARDIAC CATH LAB    ESOPHAGOSCOPY, GASTROSCOPY, DUODENOSCOPY (EGD), COMBINED N/A 7/20/2023    Procedure: Esophagoscopy, gastroscopy, duodenoscopy (EGD), combined;  Surgeon: Bekah Dash DO;  Location: [facility]    ESOPHAGOSCOPY, GASTROSCOPY, DUODENOSCOPY (EGD), COMBINED N/A 5/2/2024    Procedure: Esophagoscopy, gastroscopy, duodenoscopy (EGD), combined;  Surgeon: Tavo Donaldson MD;  [facility]    HC PPM INSERTION NEW/REPLACEMENT W/ ATRIAL&VENTRICULAR LEAD  11-    HYSTERECTOMY      LAPAROTOMY EXPLORATORY N/A 4/13/2024    Procedure: Laparotomy exploratory, Wound Vac Placement.;  Surgeon: Anthony Trammell MD;  Location: [facility]    LAPAROTOMY EXPLORATORY N/A 4/14/2024    Procedure: Abdominal Re-Exploration and Closure;  Surgeon: Anhtony Trammell MD;  Location: [facility]    LAPAROTOMY EXPLORATORY N/A 4/13/2024    Procedure: Exploratory Laparotomy;  Surgeon: Anthony Trammell MD;  Location: [facility]       Prior to Admission Medications   Prior to Admission Medications   Prescriptions Last Dose Informant Patient Reported? Taking?   Multiple Vitamins-Minerals (PRESERVISION AREDS 2+MULTI VIT PO)  Self Yes No   Sig: Take 1 capsule by mouth 2 times daily   acetaminophen (TYLENOL) 325 MG tablet  Self Yes No   Sig: Take 975 mg by mouth nightly as needed for pain   allopurinol (ZYLOPRIM) 100 MG tablet  Self No No   Sig: Take 1 tablet (100 mg) by mouth daily   amLODIPine (NORVASC) 10 MG tablet  Self No No   Sig: Take 1 tablet (10 mg) by mouth daily   apixaban ANTICOAGULANT (ELIQUIS) 2.5 MG tablet  Self No No   Sig: Take 1  tablet (2.5 mg) by mouth 2 times daily   bisacodyl (DULCOLAX) 5 MG EC tablet   No No   Sig: Two days prior to exam take two (2) tablets at 4pm. One day prior to exam take two (2) tablets at 4pm   budesonide (PULMICORT) 0.5 MG/2ML neb solution  Self No No   Sig: Take 2 mLs (0.5 mg) by nebulization 2 times daily   clopidogrel (PLAVIX) 75 MG tablet  Self No No   Sig: Take 1 tablet (75 mg) by mouth daily   ferrous sulfate (FEROSUL) 325 (65 Fe) MG tablet   No No   Sig: Take 1 tablet (325 mg) by mouth daily (with breakfast)   fluticasone-salmeterol (ADVAIR) 250-50 MCG/ACT inhaler  Self No No   Sig: INHALE 1 DOSE BY MOUTH TWICE DAILY   furosemide (LASIX) 40 MG tablet  Self No No   Sig: Take 1 tablet (40 mg) by mouth daily May take an additional 20 mg at noon as needed for swelling.   isosorbide mononitrate (ISMO/MONOKET) 20 MG tablet  Self No No   Sig: Take 3 tablets (60 mg) by mouth 2 times daily   methocarbamol (ROBAXIN) 500 MG tablet  Self No No   Sig: Take 1 tablet (500 mg) by mouth every 6 hours as needed for muscle spasms   metoprolol tartrate (LOPRESSOR) 25 MG tablet   No No   Sig: Take 2 tablets (50 mg) by mouth 2 times daily   omeprazole (PRILOSEC) 40 MG DR capsule  Self No No   Sig: Take 1 capsule (40 mg) by mouth daily   polyethylene glycol (GOLYTELY) 236 g suspension   No No   Sig: Two days before procedure at 5PM fill first container with water. Mix and drink an 8 oz glass every 10-15 minutes until HALF of the container is gone. Place the remainder in the refrigerator. One day before procedure at 5PM drink second half of bowel prep. Drink an 8 oz glass every 10-15 minutes until it is gone. Day of procedure 6 hours before arrival time fill the 2nd container with water. Mix and drink an 8 oz glass every 10-15 minutes until HALF of the container is gone. Discard the remaining solution.   potassium chloride ER (K-TAB) 20 MEQ CR tablet  Self No No   Sig: Take 1 tablet (20 mEq) by mouth daily   rosuvastatin  (CRESTOR) 20 MG tablet  Self No No   Sig: Take 1 tablet (20 mg) by mouth daily for 360 days   sodium bicarbonate 650 MG tablet   No No   Sig: Take 1 tablet (650 mg) by mouth 2 times daily   timolol maleate (TIMOPTIC) 0.5 % ophthalmic solution  Self Yes No   Sig: INSTILL 1 DROP INTO EACH EYE ONCE DAILY IN THE MORNING   vitamin B-12 (CYANOCOBALAMIN) 500 MCG tablet  Self Yes No   Sig: Take 1 tablet by mouth daily   vitamin D3 25 mcg (1000 units) tablet  Self No No   Sig: Take 1 tablet (25 mcg) by mouth every other day      Facility-Administered Medications: None          Physical Exam   Vital Signs: Temp: 97.5  F (36.4  C) Temp src: Oral BP: 133/55 Pulse: 71   Resp: 16 SpO2: 100 % O2 Device: None (Room air)    Weight: 145 lbs 0 oz    Exam:  General: Appears stated age, no acute distress  Head: Ecchymosis around right eye  Cardiovascular: S1/S2, Normal rate and rhythm, no pitting edema  Respiratory: Normal respiratory effort, lung fields clear to auscultation  Gastrointestinal: Bowel sounds normal, no tenderness to palpation   Skin: Multiple ecchymoses on extremities.   Neuropsych: Speaks clearly, answers questions approximately CII-CXII grossly intact, motor/sensory innervation of extremities grossly intact       Medical Decision Making       75 MINUTES SPENT BY ME on the date of service doing chart review, history, exam, documentation & further activities per the note.      Data     I have personally reviewed the following data over the past 24 hrs:    8.6  \   7.1 (L)   / 92 (L)     141 107 71.6 (H) /  107 (H)   3.8 21 (L) 2.00 (H) \     ALT: 16 AST: 31 AP: 50 TBILI: 0.3   ALB: 3.7 TOT PROTEIN: 6.1 (L) LIPASE: 170 (H)     Trop: 33 (H) BNP: 2,153 (H)     Procal: N/A CRP: N/A Lactic Acid: 2.6 (H)       INR:  1.54 (H) PTT:  36   D-dimer:  N/A Fibrinogen:  N/A     Ferritin:  N/A % Retic:  5.9 (H) LDH:  N/A       Imaging results reviewed over the past 24 hrs:   Recent Results (from the past 24 hour(s))   CTA Abdomen  "Pelvis with Contrast    Impression    RESIDENT PRELIMINARY INTERPRETATION  IMPRESSION:   1.  No evidence for acute GI bleed.  2.  Asymmetrically atrophic left kidney with Bosniak 2F equivalent  cyst. Consider nonemergent renal ultrasound for further evaluation.  3.  Pancreatic hypodensities measuring up to 0.9 cm, likely  representing IPMN. Consider follow-up as described below.  4.  Sub-6 mm pulmonary nodules. If patient is high-risk, consider  follow-up chest CT in 12 months for further evaluation. Otherwise no  follow-up is required.  5.  Colonic diverticulosis.  6.  IVC filter in place.    International evidence-based [place] guidelines for the management of  intraductal papillary mucinous neoplasm of the pancreas:   Surveillance is recommended if no high risk stigmata or worrisome  features are present:  Primary imaging modalities recommended are MRI/MRCP and MDCT  Size of largest cyst:   *  Less than 20 mm: Follow-up imaging in 6 months once, then every 18  months if no change. Stop surveillance if stable for 5 years.  *  More than 20 mm but less than 30 mm: Follow-up imaging at 6 months,  12 months, then yearly if no change.   *  More than 30 mm- follow up imaging every 6 months.    GI consultation for surgery/endoscopic ultrasound is recommended for  cysts with \"high-risk stigmata\" of high grade dysplasia or invasive  carcinoma such as:  1. Obstructive jaundice in a patient with cystic lesion of the head of  the pancreas  2. Enhancing mural nodule > 5mm or solid component  3. Main pancreatic duct >10mm  4. Suspicious or positive results of cytology    If worrisome features are present, GI consultation is recommended.  Worrisome features on imagin. Cyst more than or equal to 30 mm  2. Thickened or enhancing cyst walls  3. Main pancreatic duct > 5mm and < 10mm.  4. Abrupt change in caliber of pancreatic duct with distal atrophy  5. Lymphadenopathy  6. Cyst growth rate > 2.5mm/year    *Reference: " International evidence-based Kyoto guidelines for the  management of  intraductal papillary mucinous neoplasm of the pancreas Pancreatology:  24:(2024); 255-270.  https://doi.org/10.1016/j.graves.2023.12.009

## 2024-07-03 NOTE — TELEPHONE ENCOUNTER
Nurse Triage SBAR    Is this a 2nd Level Triage? YES, LICENSED PRACTITIONER REVIEW IS REQUIRED    Situation: Concern:  Patient with concerns regarding  feeling awful  -Nauseated, ? Hgb, stomach cramps, weakness   -Last HGB 6/28/24 =8.2 post transfusion. Prior 7.9 6/28/24 in ED [facility]    - Scheduled for Blood draw this morning at 11:30 but she isn't sure she will make it as she is weaker, slightly short of breath, feels like her HGB has dropped further after being transfused 6/28/24. Her stools are black in color.     Meds: Iron was held yesterday, Plavix to be held today, and Eliquis to be held 7/7/24 in preparation for upcoming colonoscopy.       Background:  PMH notable for CAD s/p multiple PCIs (on Plavix), SSS s/p dual chamber pacemaker, paroxymal atrial fibrillation (on Eliquis), CKD, and HTN who presented to the ED 6/28/24 with fatigue and shortness of breath.   5/1/24 hospitalization  for melena and anemia in setting of exploratory laparotomy 2 months ago  and small bowel obstruction 4/13/24  Assessment:Patient with weakness, SOB, feels HGB has dropped again, black stools. Recent transfusion 6/28/24 and work up for GI bleed in progress( colonoscopy 7/9/24 is planned)  Advised ED now.Her  is with her and he can drive.    Protocol Recommended Disposition: ED       Recommendation:   ED now: hx GI bleed and baseline low HGB, increasingly symptomatic ( weakness/ dizziness/ SOBblack stools- stopped iron  7/2, plan stop Plavix today and Eliquis on 7/7 prior to colonoscopy 7/9)    Routed to provider team OLIVERIO Apodaca R.N.   [facility] Centralized  Red Flag Triage Department       Reason for Disposition   Black or tarry bowel movements   Black or tarry bowel movements  (Exception: Chronic-unchanged black-grey BMs AND is taking iron pills or Pepto-Bismol.)   SEVERE dizziness (e.g., unable to stand, requires support to walk, feels like passing out now)   [1] Constant abdominal pain AND [2] present > 2 hours    "High-risk adult (e.g., prior surgery on aorta, abdominal aortic aneurysm)    Additional Information   Negative: Shock suspected (e.g., cold/pale/clammy skin, too weak to stand, low BP, rapid pulse)   Negative: Difficult to awaken or acting confused (e.g., disoriented, slurred speech)   Negative: Passed out (i.e., lost consciousness, collapsed and was not responding)   Negative: [1] Vomiting AND [2] contains red blood or black (\"coffee ground\") material  (Exception: Few red streaks in vomit that only happened once.)   Negative: Diarrhea is main symptom   Negative: Stool color other than brown or tan is main concern  (no bleeding and no melena)    Protocols used: Stools - Unusual Color-A-AH, Rectal Bleeding-A-AH    "

## 2024-07-03 NOTE — ED TRIAGE NOTES
"Chief Complaint: Patient presents to the ED for evaluation of worsening weakness, SOB, abdominal pain, dark stools.    Onset of Symptoms: Ongoing    Home Remedies: N/A    Triage Vital Signs: /59   Pulse 64   Temp 97.5  F (36.4  C) (Oral)   Resp 16   Ht 1.626 m (5' 4\")   Wt 65.8 kg (145 lb)   SpO2 100%   BMI 24.89 kg/m      Rojelio Malik RN  7/3/24     Triage Assessment (Adult)       Row Name 07/03/24 1151          Triage Assessment    Airway WDL WDL        Respiratory WDL    Respiratory WDL WDL        Skin Circulation/Temperature WDL    Skin Circulation/Temperature WDL WDL        Cardiac WDL    Cardiac WDL WDL        Peripheral/Neurovascular WDL    Peripheral Neurovascular WDL WDL        Cognitive/Neuro/Behavioral WDL    Cognitive/Neuro/Behavioral WDL WDL        Torrie Coma Scale    Best Eye Response 4-->(E4) spontaneous     Best Motor Response 6-->(M6) obeys commands     Best Verbal Response 5-->(V5) oriented     Torrie Coma Scale Score 15                     "

## 2024-07-04 LAB
ANION GAP SERPL CALCULATED.3IONS-SCNC: 11 MMOL/L (ref 7–15)
BUN SERPL-MCNC: 53.7 MG/DL (ref 8–23)
CALCIUM SERPL-MCNC: 8.5 MG/DL (ref 8.8–10.2)
CHLORIDE SERPL-SCNC: 111 MMOL/L (ref 98–107)
CREAT SERPL-MCNC: 1.8 MG/DL (ref 0.51–0.95)
DEPRECATED HCO3 PLAS-SCNC: 22 MMOL/L (ref 22–29)
EGFRCR SERPLBLD CKD-EPI 2021: 27 ML/MIN/1.73M2
ERYTHROCYTE [DISTWIDTH] IN BLOOD BY AUTOMATED COUNT: 18.7 % (ref 10–15)
GLUCOSE SERPL-MCNC: 95 MG/DL (ref 70–99)
HCT VFR BLD AUTO: 24.5 % (ref 35–47)
HGB BLD-MCNC: 7.7 G/DL (ref 11.7–15.7)
HGB BLD-MCNC: 7.7 G/DL (ref 11.7–15.7)
HGB BLD-MCNC: 8.4 G/DL (ref 11.7–15.7)
MCH RBC QN AUTO: 30.1 PG (ref 26.5–33)
MCHC RBC AUTO-ENTMCNC: 31.4 G/DL (ref 31.5–36.5)
MCV RBC AUTO: 96 FL (ref 78–100)
PLATELET # BLD AUTO: 84 10E3/UL (ref 150–450)
POTASSIUM SERPL-SCNC: 3.4 MMOL/L (ref 3.4–5.3)
RBC # BLD AUTO: 2.56 10E6/UL (ref 3.8–5.2)
SODIUM SERPL-SCNC: 144 MMOL/L (ref 135–145)
WBC # BLD AUTO: 6.8 10E3/UL (ref 4–11)

## 2024-07-04 PROCEDURE — 250N000013 HC RX MED GY IP 250 OP 250 PS 637: Performed by: STUDENT IN AN ORGANIZED HEALTH CARE EDUCATION/TRAINING PROGRAM

## 2024-07-04 PROCEDURE — 85027 COMPLETE CBC AUTOMATED: CPT

## 2024-07-04 PROCEDURE — 36415 COLL VENOUS BLD VENIPUNCTURE: CPT | Performed by: STUDENT IN AN ORGANIZED HEALTH CARE EDUCATION/TRAINING PROGRAM

## 2024-07-04 PROCEDURE — 250N000013 HC RX MED GY IP 250 OP 250 PS 637

## 2024-07-04 PROCEDURE — 99222 1ST HOSP IP/OBS MODERATE 55: CPT | Mod: GC | Performed by: INTERNAL MEDICINE

## 2024-07-04 PROCEDURE — 250N000009 HC RX 250

## 2024-07-04 PROCEDURE — 36415 COLL VENOUS BLD VENIPUNCTURE: CPT

## 2024-07-04 PROCEDURE — 85018 HEMOGLOBIN: CPT | Performed by: STUDENT IN AN ORGANIZED HEALTH CARE EDUCATION/TRAINING PROGRAM

## 2024-07-04 PROCEDURE — 99233 SBSQ HOSP IP/OBS HIGH 50: CPT | Performed by: STUDENT IN AN ORGANIZED HEALTH CARE EDUCATION/TRAINING PROGRAM

## 2024-07-04 PROCEDURE — 120N000002 HC R&B MED SURG/OB UMMC

## 2024-07-04 PROCEDURE — 80048 BASIC METABOLIC PNL TOTAL CA: CPT

## 2024-07-04 RX ADMIN — TIMOLOL MALEATE 1 DROP: 5 SOLUTION/ DROPS OPHTHALMIC at 08:35

## 2024-07-04 RX ADMIN — PANTOPRAZOLE SODIUM 40 MG: 40 TABLET, DELAYED RELEASE ORAL at 08:31

## 2024-07-04 RX ADMIN — ISOSORBIDE MONONITRATE 60 MG: 20 TABLET ORAL at 20:23

## 2024-07-04 RX ADMIN — ROSUVASTATIN CALCIUM 20 MG: 20 TABLET, FILM COATED ORAL at 08:31

## 2024-07-04 RX ADMIN — FUROSEMIDE 40 MG: 40 TABLET ORAL at 08:31

## 2024-07-04 RX ADMIN — APIXABAN 2.5 MG: 2.5 TABLET, FILM COATED ORAL at 20:24

## 2024-07-04 RX ADMIN — ALLOPURINOL 100 MG: 100 TABLET ORAL at 08:31

## 2024-07-04 RX ADMIN — SODIUM BICARBONATE 650 MG TABLET 650 MG: at 20:23

## 2024-07-04 RX ADMIN — METOPROLOL TARTRATE 50 MG: 50 TABLET, FILM COATED ORAL at 20:23

## 2024-07-04 RX ADMIN — AMLODIPINE BESYLATE 10 MG: 10 TABLET ORAL at 08:31

## 2024-07-04 RX ADMIN — METOPROLOL TARTRATE 50 MG: 50 TABLET, FILM COATED ORAL at 08:30

## 2024-07-04 RX ADMIN — POTASSIUM CHLORIDE 20 MEQ: 750 TABLET, EXTENDED RELEASE ORAL at 08:31

## 2024-07-04 RX ADMIN — SODIUM BICARBONATE 650 MG TABLET 650 MG: at 08:30

## 2024-07-04 RX ADMIN — PANTOPRAZOLE SODIUM 40 MG: 40 TABLET, DELAYED RELEASE ORAL at 20:23

## 2024-07-04 RX ADMIN — ISOSORBIDE MONONITRATE 60 MG: 20 TABLET ORAL at 08:31

## 2024-07-04 ASSESSMENT — ACTIVITIES OF DAILY LIVING (ADL)
ADLS_ACUITY_SCORE: 42
ADLS_ACUITY_SCORE: 40
ADLS_ACUITY_SCORE: 42
DEPENDENT_IADLS:: INDEPENDENT
ADLS_ACUITY_SCORE: 42
ADLS_ACUITY_SCORE: 42
ADLS_ACUITY_SCORE: 40
ADLS_ACUITY_SCORE: 42

## 2024-07-04 NOTE — PROGRESS NOTES
[facility]    Medicine Progress Note - Hospitalist Service, GOLD TEAM 9    Date of Admission:  7/3/2024    Assessment & Plan      Tessa Mansfield is a 89 year old female admitted on 7/3/2024.  Medical history notable for recent hospitalization for blood loss anemia, CAD s/p multiple stents, sick sinus syndrome s/p dual-chamber pacemaker, paroxysmal A-fib (on Eliquis), hyperlipidemia, hypertension, SBO s/p ex lap (4/2023), CKD stage IV, cough variant asthma.  Presented to emergency department with concern of fatigue.    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 EGD performed at that time demonstrating possibly an AVM oozing in setting of anticoagulation. Additionally may have possible AVM of right colon vs small bowel. On 7/3 patient presented to the emergency department with concern of fatgue. Reports possible melena (taking oral iron). Hgb upon arrival 6.9. Transfusion initiated. 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   -GI consulted recs appreciated   -Recommend cardiology consult for assessment of anticoagulation  -Recommended establishment with anemia clinic for routine outpatient transfusion, has cardiology and nephrology follow up in next few months, will replace referral to see if earlier appointment available.   -Not a candidate for endoscopy/colonoscopy  -Recommend outpatient IV iron   -Continue PPI    CAD s/p multiple PCI, last stent 11/2023  Hyperlipidemia  Hypertension  HFpEF   Mild-moderate mitral regurgitation   Chronic dyspnea  Multiple stents, 2 RCA, 3 LAD, 1 left circumflex, 1 right posterior descending.  Most recent mid LAD in November 2023.  Chronic heart failure with preserved fraction on problem list, last echo LVEF >50%.  Noted by  cardiology that dyspnea is patient anginal equivalent and has improved post PCI.  Troponin mildly elevated in ED, noted to have chronic mild elevation.  -Cardiology consulted recs appreciated.     - AC under discussion   -Continue PTA amlodipine  -Continue PTA metoprolol  -Continue PTA isosorbide mononitrate  -Continue PTA furosemide w/ oral potassium replacement    Paroxysmal atrial fib  Sick sinus syndrome status post dual-chamber pacemaker  [id number]: 5. Prior to arrival Eliquis.  -Eliquis currently on hold with current presumed bleed  -Cardiology consulted    CKD stage IV  Baseline creatinine 1.5-2.0.  Creatinine on admission 2.0  -Continue PTA sodium bicarb  -Avoid nephrotoxic medications  -BMP in a.m.    Recent weight loss  Reports 10 to 20 pound weight loss.  Some variance in weight throughout multiple visits over the few months.  Noted to be 162 pounds in April.  Currently 145 lbs.  Patient reports decreased oral intake.  No clear etiology.  -Nutrition consult  -Follow-up with PCP if weight loss continues    Glaucoma  -Continue PTA timolol eyedrops    Cough variant asthma   No acute exacerbation.   -PTA inhaler     Diet: Regular adult  DVT Prophylaxis: Pneumatic Compression Devices  Bonner Catheter: Not present  Lines: None     Cardiac Monitoring: None  Code Status:  Full          Diet: Combination Diet Regular Diet Adult    DVT Prophylaxis: Apixiban  Bonner Catheter: Not present  Lines: None     Cardiac Monitoring: None  Code Status: Full Code      Clinically Significant Risk Factors Present on Admission               # Drug Induced Coagulation Defect: home medication list includes an anticoagulant medication  # Thrombocytopenia: Lowest platelets = 84 in last 2 days, will monitor for bleeding   # Hypertension: Noted on problem list  # Chronic heart failure with preserved ejection fraction: heart failure noted on problem list and last echo with EF >50%   # Anemia: based on hgb <11  # Anemia: based on hgb  <11               # Financial/Environmental Concerns:     # Pacemaker present       Disposition Plan     Medically Ready for Discharge: Anticipated Tomorrow         Ami Verdin MD  Hospitalist Service, GOLD TEAM 9  [facility]  Securely message with youwho (more info)  Text page via [facility] Paging/Directory   See signed in provider for up to date coverage information  ______________________________________________________________________    Interval History   Admitted overnight for acute anemia 2/2 chronic GI oozing from AVMs. Received pRBC overnight. No chest pain this AM. No SOB. Feeling much better although still quite fatigued. Understands that the bleeding vs clotting risk is a major challenge for keeping her stable with needing the blood thinners but also wanting to avoid repeat hospitalizations. Does feel like she will be able to get up and move around more today.      Physical Exam   Vital Signs: Temp: 97.9  F (36.6  C) Temp src: Oral BP: 122/48 Pulse: 64   Resp: 16 SpO2: 100 % O2 Device: None (Room air)    Weight: 145 lbs 0 oz    Gen: NAD, alert, pleasant, cooperative, non-toxic, mild pallor  HEENT: EOMI, no conjunctival icterus, tracking appropriately  Resp: CTAB, no crackles or wheezes, no increased WOB  Cardiac: RRR, no S3/S4, pacemaker in place  GI: soft, non-tender, non-distended  Ext: WWP, no edema, spontaneous movement in all 4  Neuro: AOx3, CN 2-12 grossly intact, appropriate mentation      Medical Decision Making       55 MINUTES SPENT BY ME on the date of service doing chart review, history, exam, documentation & further activities per the note.  MANAGEMENT DISCUSSED with the following over the past 24 hours: patient, RN, cardiology       Data     I have personally reviewed the following data over the past 24 hrs:    6.8  \   7.7 (L)   / 84 (L)     144 111 (H) 53.7 (H) /  95   3.4 22 1.80 (H) \     ALT: 16 AST: 31 AP: 50 TBILI: 0.3   ALB: 3.7 TOT  PROTEIN: 6.1 (L) LIPASE: 170 (H)     Trop: 33 (H) BNP: 2,153 (H)     Procal: N/A CRP: N/A Lactic Acid: 2.6 (H)       INR:  1.54 (H) PTT:  36   D-dimer:  N/A Fibrinogen:  N/A     Ferritin:  N/A % Retic:  5.9 (H) LDH:  N/A

## 2024-07-04 NOTE — CONSULTS
[facility]    Cardiology Consult Note-Cards 1      Date of Admission:  7/3/2024  Consult Requested by: Maulik Mcgovern  Reason for Consult: Recurrent admission for blood loss anemia, concern for oozing AVM, reassess need of anticoagulation, hemoglobin goals    Assessment & Plan: KARI Zarate RAHEEM Mansfield is a 89 year old female admitted on 7/3/2024 for increasing fatigue and shortness of breath with four days of melena. She has history of CAD s/p several stents on Plavix, most recently 11/2023 AVLAREZ to mid LAD, sick sinus syndrome s/p pacemaker placement in 2007, atrial fibrillation on Eliquis at home, hyperlipidemia, hypertension, among others. Current acute on chronic anemia requiring transfusion is similar to last presentation 06/19 with suspected oozing from a GI AVM. GI, hematology, and cardiology were consulted at that time and cardiology had a discussion at length with the patient regarding risks and benefits of continued use of Plavix for CAD and Eliquis for stroke risk from her atrial fibrillation, during which the patient opted to continue both medications and revisit the issue in the event of a future bleed.     We did discuss several options with the patient including continuation of Eliquis and Plavix, discontinuing both medications, or pursuing placement of a Watchman device with the risks and benefits of all discussed including bleeding and stroke among others as well as being within 12months of her most recent stent placement. She opts for Watchman placement and this time and would like to continue Eliquis and Plavix in the meantime. Reasonable to maintain hgb > 7. This would appear consistent with decision that was made after discussion with cardiology last time on 6/20.    # CAD s/p several stents (last one placed 11/2023)  # Acute on chronic anemia with suspected oozing GI AVM  # HFpEF, EF 55-60% on echo 10/2023  # Sick sinus syndrome s/p pacemaker  placement in 2007  # Hypertension  # Hyperlipidemia     Recommendations:   - Outpatient Watchman placement, would refer to structural cardiology as an outpatient  - Continue Eliquis and Plavix per patient request after long discussion of risks and benefits   - Continue rosuvastatin 20mg  - Continue metoprolol tartrate 50mg BID  - Continue amlodipine 10mg daily   - Maintain hgb > 7, transfusions as required     The patient's care was discussed with the Attending Physician, Dr. Anderson and Cardiology Fellow, Dr. Samuel . We will sign off at this time, please feel free to reach out with any additional questions or concerns.     Ginny Medina  Visiting Medical Student  [facility]    I saw this patient together with medical student/resident, and performed an independent exam at the same time which confirmed findings. I have edited this note as appropriate to reflect our joint assessment/plan. Patient was also seen and discussed with attending physician, Dr. Anderson, who is in agreement with above.     Donell Samuel  Cardiology Fellow      Clinically Significant Risk Factors Present on Admission               # Drug Induced Coagulation Defect: home medication list includes an anticoagulant medication  # Thrombocytopenia: Lowest platelets = 84 in last 2 days, will monitor for bleeding   # Hypertension: Noted on problem list  # Chronic heart failure with preserved ejection fraction: heart failure noted on problem list and last echo with EF >50%   # Anemia: based on hgb <11  # Anemia: based on hgb <11               # Financial/Environmental Concerns:     # Pacemaker present    ______________________________________________________________________    Chief Complaint   Fatigue, Melena    History is obtained from the patient    History of Present Illness   Tessa Mansfield is a 89 year old female who presented for fatigue and shortness of breath with four days of melena. She recently  had a one day admission for acute on chronic anemia in 06/19/2024 with hgb 6.5 requiring transfusion. She was evaluated by GI, hematology and cardiology a that time with suspected slow ooze from the AVM where it was decided to continue antiplatelet and anticoagulation therapy with recommendation for further outpatient GI workup including endoscopies for source management. It was discussed to potentially stop Eliquis and continue Plavix if she were to have an additional bleeding episode.     Here, she has received one unit pRBCs. Last Plavix dose was 07/03, this was held for possible inpatient endoscopy which has ultimately been deferred at this time. She follows with Dr. Santoyo and Randa Ann CNP for outpatient cardiology. She also underwent exlap for SBO in 4/2024.     Most recent catheterization was in 11/2023 with stent placement, results below. Last echocardiogram was 10/2023 with results below.     Cardiac Catheterization from 11/06/2023:       Echocardiogram 10/2023:   Interpretation Summary  Global and regional left ventricular function is normal with an EF of 55-60%.  Right ventricular function, chamber size, wall motion, and thickness are  normal.  Mild to moderate mitral insufficiency is present.  Mild tricuspid insufficiency is present.  The right ventricular systolic pressure is 35mmHg above the right atrial  pressure.  The inferior vena cava is normal.  No pericardial effusion is present.    Past Medical History    Past Medical History:   Diagnosis Date    CAD (coronary artery disease)     CAD s/p PCI+BMS to MRCA 10/2002, PCI to mLCX 2/2003, PCI+DESx2 to pLAD 11/2007, PCI+DESx1 to dRCA 12/2007, PCI+ALVAREZ to RPDA 7/2013; on indefinite DAPT  10/27/2002    10/27/2002: AMI s/p PCI+BMS (3.5x18mm Bx velocity) to mRCA 2/5/2003: Back pain; PCI+stent to mLCX c/b distal embolization/slow flow 4/11/2003: Unstable angina; PCI+stent (Hepacoat velocity) to mLAD 11/27/2007: PCI+DESx2 to pLAD (IVUS w/  calcification of LMCA and ulcerated plaque pLAD, 80% dRCA; also had 80% dLCX, too small to stent) 12/11/2007: Staged PCI. PCI+DESx1 (3.5x13mm Cypher) to dRCA (indefinite DAPT recommended at this time) 6/27/2008: Angina. mLCX stent 70% ISR. LAD and RCA stents patent. Myocardium at risk from LCX felt to be small, medical management preferred to PCI. 11/10/2009: Angina. Findings unchanged from 6/27/2008, FFR LAD 0.90. Medical management recommended. 7/23/2013: Unstable angina; PCI+ALVAREZ to mPDA. Diagnostic findings: LMCA 40% distal. LAD: pLAD stents patent mLAD 30% ISR dLAD 50% diffuse, D2 diffuse disease. LCX 80% mid ISR. RCA diffuse <30% mid, RPLAs diffuse disease, RPDA 100% occlusion.      Cardiac Pacemaker- [employer], dual chamber- NOT dependant 11/28/2007    CKD (chronic kidney disease), stage IV (H)     Hyperlipidemia LDL goal <70     Hypertension     Stented coronary artery 10-    RCA    Stented coronary artery 2-5-2003    LCx    Stented coronary artery 4-    LAD    Stented coronary artery 11-    LAD    Stented coronary artery 12-    RCA    Transient complete heart block (H) 11/28/2007       Past Surgical History   Past Surgical History:   Procedure Laterality Date    CHOLECYSTECTOMY      CV CORONARY ANGIOGRAM N/A 6/17/2022    Procedure: Coronary Angiogram;  Surgeon: Constantine Macias MD;  Location: [facility] CARDIAC CATH LAB    CV CORONARY ANGIOGRAM N/A 7/17/2023    Procedure: Coronary Angiogram;  Surgeon: Constantine Macias MD;  Location: [facility] CARDIAC CATH LAB    CV PCI N/A 6/17/2022    Procedure: Percutaneous Coronary Intervention;  Surgeon: Constantine Macias MD;  Location: [facility] CARDIAC CATH LAB    CV PCI N/A 7/17/2023    Procedure: Percutaneous Coronary Intervention;  Surgeon: Constantine Macias MD;  Location: [facility] CARDIAC CATH LAB    CV PCI N/A 11/6/2023    Procedure: Percutaneous Coronary Intervention;  Surgeon: Constantine Macias MD;  Location:   HEART CARDIAC CATH LAB    CV RIGHT HEART CATH MEASUREMENTS RECORDED N/A 6/17/2022    Procedure: Right Heart Catheterization;  Surgeon: Constantine Macias MD;  Location:  HEART CARDIAC CATH LAB    EP PACEMAKER N/A 10/15/2019    Procedure: EP PACEMAKER;  Surgeon: Anthony Zhu MD;  Location:  HEART CARDIAC CATH LAB    ESOPHAGOSCOPY, GASTROSCOPY, DUODENOSCOPY (EGD), COMBINED N/A 7/20/2023    Procedure: Esophagoscopy, gastroscopy, duodenoscopy (EGD), combined;  Surgeon: Bekah Dash DO;  Location:  GI    ESOPHAGOSCOPY, GASTROSCOPY, DUODENOSCOPY (EGD), COMBINED N/A 5/2/2024    Procedure: Esophagoscopy, gastroscopy, duodenoscopy (EGD), combined;  Surgeon: Tavo Donaldson MD;  Location:  GI    HC PPM INSERTION NEW/REPLACEMENT W/ ATRIAL&VENTRICULAR LEAD  11-    HYSTERECTOMY      LAPAROTOMY EXPLORATORY N/A 4/13/2024    Procedure: Laparotomy exploratory, Wound Vac Placement.;  Surgeon: Anthony Trammell MD;  Location: [facility] OR    LAPAROTOMY EXPLORATORY N/A 4/14/2024    Procedure: Abdominal Re-Exploration and Closure;  Surgeon: Anthony Trammell MD;  Location: [facility] OR    LAPAROTOMY EXPLORATORY N/A 4/13/2024    Procedure: Exploratory Laparotomy;  Surgeon: Anthony Trammell MD;  Location: [facility] OR       Medications   I have reviewed this patient's current medications.      Review of Systems    The 10 point Review of Systems is negative other than noted in the HPI or here.     See [facility] for additional social and family history.     Physical Exam   Vital Signs: Temp: 98.2  F (36.8  C) Temp src: Oral BP: 133/58 Pulse: 69   Resp: 16 SpO2: 99 % O2 Device: None (Room air)    Weight: 145 lbs 0 oz    General: Patient is in no acute distress. Sitting upright in bed. Mild pallor.  HEENT: No scleral icterus. EOMs intact.   Resp: No increased work of breathing. Lungs are clear to auscultation bilaterally.    Cardio: Regular rate and rhythm. Regular S1 and S2 without S3 or S4. No JVD. There is no  peripheral edema.   Abd/: Abdomen is nonprotuberant.   MSK: No gross deformities.   Neuro: Patient is alert and comfortably conversant.   Psych: No hallucinations. Though process is coherent and logical.     Medical Decision Making           Data     I have personally reviewed the following data over the past 24 hrs:    6.8  \   7.7 (L)   / 84 (L)     144 111 (H) 53.7 (H) /  95   3.4 22 1.80 (H) \     ALT: 16 AST: 31 AP: 50 TBILI: 0.3   ALB: 3.7 TOT PROTEIN: 6.1 (L) LIPASE: 170 (H)     Trop: 33 (H) BNP: 2,153 (H)     Procal: N/A CRP: N/A Lactic Acid: 2.6 (H)       INR:  1.54 (H) PTT:  36   D-dimer:  N/A Fibrinogen:  N/A     Ferritin:  N/A % Retic:  5.9 (H) LDH:  N/A       EKG from 07/03/2024:       CTA Abdomen Pelvis from 07/03/2024:  IMPRESSION:   1.  No evidence for acute GI bleed.  2.  Asymmetrically atrophic left kidney with Bosniak 2F equivalent  cyst. Consider nonemergent renal ultrasound for further evaluation.  3.  Pancreatic hypodensities measuring up to 0.9 cm, likely  representing IPMN. Consider follow-up as described below.  4.  Sub-6 mm pulmonary nodules. If patient is high-risk, consider  follow-up chest CT in 12 months for further evaluation. Otherwise no  follow-up is required.  5.  Colonic diverticulosis.  6.  IVC filter in place.        I have personally reviewed the patients chart and discussed the following with the patient/family: 1) The choices available for reducing stroke risk from atrial fibrillation, 2) Treatment options available including respective risk/benefits, and 3) What factors are most important for the patient in making their decision.     The [employer] shared decision making tool: https://www.cardiosmart.org/SDM/Decision-Aids/Find-Decision-Aids/Atrial-Fibrillation   was used to guide this conversation. The patient was counseled that their decision could be made at this time or in the future if more time was needed to consider their decision. I agree that a EVA occlusion device is  indicated.

## 2024-07-04 NOTE — MEDICATION SCRIBE - ADMISSION MEDICATION HISTORY
Medication Scribe Admission Medication History    Admission medication history is complete. The information provided in this note is only as accurate as the sources available at the time of the update.    Information Source(s): Patient via in-person    Pertinent Information: Pt reported taking medications on PTA medication list as directed.    Changes made to PTA medication list:  Added: None  Deleted: Bisacodyl 5 mg tabs, Budesonide 0.5 mg/ml neb solution, Clopidogrel 75 mg tabs, Ferrous sulfate 325 mg tabs.   Changed: None    Allergies reviewed with patient and updates made in EHR: yes    Medication History Completed By: Ruby Hong 7/4/2024 7:13 AM    PTA Med List   Medication Sig Last Dose    acetaminophen (TYLENOL) 325 MG tablet Take 975 mg by mouth nightly as needed for pain 7/2/2024    allopurinol (ZYLOPRIM) 100 MG tablet Take 1 tablet (100 mg) by mouth daily 7/3/2024 at am    amLODIPine (NORVASC) 10 MG tablet Take 1 tablet (10 mg) by mouth daily 7/3/2024 at am    apixaban ANTICOAGULANT (ELIQUIS) 2.5 MG tablet Take 1 tablet (2.5 mg) by mouth 2 times daily 7/3/2024 at am    furosemide (LASIX) 40 MG tablet Take 1 tablet (40 mg) by mouth daily May take an additional 20 mg at noon as needed for swelling. 7/3/2024 at am    isosorbide mononitrate (ISMO/MONOKET) 20 MG tablet Take 3 tablets (60 mg) by mouth 2 times daily 7/3/2024 at am    methocarbamol (ROBAXIN) 500 MG tablet Take 1 tablet (500 mg) by mouth every 6 hours as needed for muscle spasms Unknown    metoprolol tartrate (LOPRESSOR) 25 MG tablet Take 2 tablets (50 mg) by mouth 2 times daily 7/3/2024 at am    omeprazole (PRILOSEC) 40 MG DR capsule Take 1 capsule (40 mg) by mouth daily 7/3/2024 at am    potassium chloride ER (K-TAB) 20 MEQ CR tablet Take 1 tablet (20 mEq) by mouth daily Unknown    rosuvastatin (CRESTOR) 20 MG tablet Take 1 tablet (20 mg) by mouth daily for 360 days 7/3/2024    sodium bicarbonate 650 MG tablet Take 1 tablet (650 mg) by  mouth 2 times daily 7/3/2024    timolol maleate (TIMOPTIC) 0.5 % ophthalmic solution INSTILL 1 DROP INTO EACH EYE ONCE DAILY IN THE MORNING 7/3/2024    vitamin B-12 (CYANOCOBALAMIN) 500 MCG tablet Take 1 tablet by mouth daily 7/3/2024    vitamin D3 25 mcg (1000 units) tablet Take 1 tablet (25 mcg) by mouth every other day 7/3/2024

## 2024-07-04 NOTE — PROGRESS NOTES
SW Care Management Initial Consult    General Information  Assessment completed with: Patient, VM-chart review,    Type of CM/SW Visit: Initial Assessment    Primary Care Provider verified and updated as needed:     Readmission within the last 30 days: previous discharge plan unsuccessful      Reason for Consult: discharge planning  Advance Care Planning: Advance Care Planning Reviewed: no concerns identified          Communication Assessment  Patient's communication style: spoken language (English or Bilingual)             Cognitive  Cognitive/Neuro/Behavioral: WDL                      Living Environment:   People in home: spouse     Current living Arrangements: house      Able to return to prior arrangements: yes       Family/Social Support:  Care provided by: self, spouse/significant other  Provides care for: no one                Description of Support System:           Current Resources:   Patient receiving home care services: Yes ([facility] as per pt)  Skilled Home Care Services: Physical Therapy  Community Resources:    Equipment currently used at home: cane, straight, grab bar, tub/shower, grab bar, toilet, shower chair, walker, standard, walker, rolling  Supplies currently used at home: Incontinence Supplies    Employment/Financial:  Employment Status: retired        Financial Concerns: none           Does the patient's insurance plan have a 3 day qualifying hospital stay waiver?  Yes     Which insurance plan 3 day waiver is available? Alternative insurance waiver    Will the waiver be used for post-acute placement? Undetermined at this time    Lifestyle & Psychosocial Needs:  Social Determinants of Health     Food Insecurity: Low Risk  (11/15/2023)    Food Insecurity     Within the past 12 months, did you worry that your food would run out before you got money to buy more?: No     Within the past 12 months, did the food you bought just not last and you didn t have money to get more?: No   Depression: Not  at risk (1/2/2024)    PHQ-2     PHQ-2 Score: 0   Housing Stability: Low Risk  (11/15/2023)    Housing Stability     Do you have housing? : Yes     Are you worried about losing your housing?: No   Tobacco Use: Medium Risk (7/3/2024)    Patient History     Smoking Tobacco Use: Former     Smokeless Tobacco Use: Never     Passive Exposure: Not on file   Financial Resource Strain: Low Risk  (11/15/2023)    Financial Resource Strain     Within the past 12 months, have you or your family members you live with been unable to get utilities (heat, electricity) when it was really needed?: No   Alcohol Use: Not on file   Transportation Needs: Low Risk  (11/15/2023)    Transportation Needs     Within the past 12 months, has lack of transportation kept you from medical appointments, getting your medicines, non-medical meetings or appointments, work, or from getting things that you need?: No   Physical Activity: Not on file   Interpersonal Safety: Low Risk  (11/15/2023)    Interpersonal Safety     Do you feel physically and emotionally safe where you currently live?: Yes     Within the past 12 months, have you been hit, slapped, kicked or otherwise physically hurt by someone?: No     Within the past 12 months, have you been humiliated or emotionally abused in other ways by your partner or ex-partner?: No   Stress: Not on file   Social Connections: Not on file   Health Literacy: Not on file       Functional Status:  Prior to admission patient needed assistance:   Dependent ADLs:: Ambulation-cane, Ambulation-walker (spouse can assist with most things when struggling to complete tasks)  Dependent IADLs:: Independent       Mental Health Status:  Mental Health Status: Past Concern       Chemical Dependency Status:  Chemical Dependency Status: No Current Concerns (hasn't smoked in 40 years)             Values/Beliefs:  Spiritual, Cultural Beliefs, Religion Practices, Values that affect care: no               Additional Information:  SW  reviewed pt chart and spoke with patient as part of the assessment. Pt has presented to ED 3x in last 30 days for same condition. Pt reports that she has done well with previous discharges but then suffers symptoms that warrant return to hospital. Pt most recently working with [facility] in her home for PT/OT. Pt feels this is useful for her. Pt lives with spouse and was able to identify PCP. Pt has 3 adult sons who live locally that are also supportive. Pt does not feel return to TCU would improve her condition.     SW will continue to follow for discharge needs.    EDGAR Mckinley   7/4/2024       Social Work and Care Management Department       SEARCHABLE in [facility] - search SOCIAL WORK       [facility] (0800 - 1630) Saturday and Sunday     Units: 4A Vocera, 4C Vocera, & 4E Vocera        Units: 5A 8757-1155 Vocera, 5A 9171-9412 Vocera , BMT SW 1 BMT SW 2, BMT SW 3 & BMT SW 4  5C Off Service 5401 - 5416  5C Off Service 2529-6955     Units: 6A Vocera & 6B Vocera      Units: 6C Vocera     Units: 7A Vocera & 7B Vocera      Units: 7C Med Surg 7401 thru 7418 and 7C Med Surg 7502 thru 7521      Unit: [facility] ED Vocera & [facility] Obs Vocera     [facility] (2040-8206) Saturday and Sunday      Units: 5 Ortho Vocera, 5 Med Surg Vocera & WB ED Vocera     Units: 6 Med Surg Vocera, 8 Med Surg Vocera, & 10 ICU Vocera      After hours Vocera [facility] and After Hours Vocera [facility]     Saturday & Sunday (1630 - 0000)    Mon-Fri (9654-2507)     [facility] Recognized Holidays  (8227-1542)    Units: ALL   - see above VOCERA links to units and after hours      EDGAR Mckinley

## 2024-07-04 NOTE — PROGRESS NOTES
Shift: 2047-5315    V/S & Pain: VSS on RA. Denies pain  Neuro: AOx4, calm and cooperative  Respiratory: Stable on RA, lung sounds clear bilaterally  Cardiac: wdl  Skin: No new skin concerns  GI/: Voids spontaneously  Nutrition: Regular diet with good appetite, adequate PO intake, denies N/V  L/D: PIV saline locked  Activity: up w/ 1 assist to commode  Labs: monitoring, RN managed    Events this shift: no acute events this shift. Pt remains vitally stable on RA. Call light within reach, calls appropriately

## 2024-07-05 ENCOUNTER — TELEPHONE (OUTPATIENT)
Dept: NEPHROLOGY | Facility: CLINIC | Age: 89
End: 2024-07-05
Payer: COMMERCIAL

## 2024-07-05 ENCOUNTER — APPOINTMENT (OUTPATIENT)
Dept: PHYSICAL THERAPY | Facility: CLINIC | Age: 89
DRG: 377 | End: 2024-07-05
Attending: STUDENT IN AN ORGANIZED HEALTH CARE EDUCATION/TRAINING PROGRAM
Payer: COMMERCIAL

## 2024-07-05 ENCOUNTER — TELEPHONE (OUTPATIENT)
Dept: GASTROENTEROLOGY | Facility: CLINIC | Age: 89
End: 2024-07-05
Payer: COMMERCIAL

## 2024-07-05 DIAGNOSIS — N18.31 STAGE 3A CHRONIC KIDNEY DISEASE (H): Primary | ICD-10-CM

## 2024-07-05 LAB
ANION GAP SERPL CALCULATED.3IONS-SCNC: 12 MMOL/L (ref 7–15)
BLD PROD TYP BPU: NORMAL
BLOOD COMPONENT TYPE: NORMAL
BUN SERPL-MCNC: 47.3 MG/DL (ref 8–23)
CALCIUM SERPL-MCNC: 8.7 MG/DL (ref 8.8–10.2)
CHLORIDE SERPL-SCNC: 108 MMOL/L (ref 98–107)
CODING SYSTEM: NORMAL
CREAT SERPL-MCNC: 1.74 MG/DL (ref 0.51–0.95)
CROSSMATCH: NORMAL
DEPRECATED HCO3 PLAS-SCNC: 22 MMOL/L (ref 22–29)
EGFRCR SERPLBLD CKD-EPI 2021: 28 ML/MIN/1.73M2
ERYTHROCYTE [DISTWIDTH] IN BLOOD BY AUTOMATED COUNT: 18.5 % (ref 10–15)
GLUCOSE SERPL-MCNC: 95 MG/DL (ref 70–99)
HCT VFR BLD AUTO: 23.5 % (ref 35–47)
HGB BLD-MCNC: 7.5 G/DL (ref 11.7–15.7)
HGB BLD-MCNC: 7.6 G/DL (ref 11.7–15.7)
ISSUE DATE AND TIME: NORMAL
MCH RBC QN AUTO: 30.8 PG (ref 26.5–33)
MCHC RBC AUTO-ENTMCNC: 32.3 G/DL (ref 31.5–36.5)
MCV RBC AUTO: 95 FL (ref 78–100)
PATH REPORT.COMMENTS IMP SPEC: NORMAL
PATH REPORT.COMMENTS IMP SPEC: NORMAL
PATH REPORT.FINAL DX SPEC: NORMAL
PATH REPORT.MICROSCOPIC SPEC OTHER STN: NORMAL
PATH REPORT.MICROSCOPIC SPEC OTHER STN: NORMAL
PATH REPORT.RELEVANT HX SPEC: NORMAL
PLATELET # BLD AUTO: 98 10E3/UL (ref 150–450)
POTASSIUM SERPL-SCNC: 3.5 MMOL/L (ref 3.4–5.3)
RBC # BLD AUTO: 2.47 10E6/UL (ref 3.8–5.2)
SODIUM SERPL-SCNC: 142 MMOL/L (ref 135–145)
UNIT ABO/RH: NORMAL
UNIT NUMBER: NORMAL
UNIT STATUS: NORMAL
UNIT TYPE ISBT: 9500
WBC # BLD AUTO: 7.5 10E3/UL (ref 4–11)

## 2024-07-05 PROCEDURE — 999N000128 HC STATISTIC PERIPHERAL IV START W/O US GUIDANCE

## 2024-07-05 PROCEDURE — 999N000111 HC STATISTIC OT IP EVAL DEFER

## 2024-07-05 PROCEDURE — 85018 HEMOGLOBIN: CPT | Performed by: STUDENT IN AN ORGANIZED HEALTH CARE EDUCATION/TRAINING PROGRAM

## 2024-07-05 PROCEDURE — 250N000013 HC RX MED GY IP 250 OP 250 PS 637

## 2024-07-05 PROCEDURE — 80048 BASIC METABOLIC PNL TOTAL CA: CPT | Performed by: STUDENT IN AN ORGANIZED HEALTH CARE EDUCATION/TRAINING PROGRAM

## 2024-07-05 PROCEDURE — 85027 COMPLETE CBC AUTOMATED: CPT | Performed by: STUDENT IN AN ORGANIZED HEALTH CARE EDUCATION/TRAINING PROGRAM

## 2024-07-05 PROCEDURE — 250N000013 HC RX MED GY IP 250 OP 250 PS 637: Performed by: STUDENT IN AN ORGANIZED HEALTH CARE EDUCATION/TRAINING PROGRAM

## 2024-07-05 PROCEDURE — 99233 SBSQ HOSP IP/OBS HIGH 50: CPT | Performed by: STUDENT IN AN ORGANIZED HEALTH CARE EDUCATION/TRAINING PROGRAM

## 2024-07-05 PROCEDURE — 120N000002 HC R&B MED SURG/OB UMMC

## 2024-07-05 PROCEDURE — P9016 RBC LEUKOCYTES REDUCED: HCPCS | Performed by: STUDENT IN AN ORGANIZED HEALTH CARE EDUCATION/TRAINING PROGRAM

## 2024-07-05 PROCEDURE — 250N000009 HC RX 250

## 2024-07-05 PROCEDURE — 36415 COLL VENOUS BLD VENIPUNCTURE: CPT | Performed by: STUDENT IN AN ORGANIZED HEALTH CARE EDUCATION/TRAINING PROGRAM

## 2024-07-05 PROCEDURE — 97530 THERAPEUTIC ACTIVITIES: CPT | Mod: GP

## 2024-07-05 PROCEDURE — 97161 PT EVAL LOW COMPLEX 20 MIN: CPT | Mod: GP

## 2024-07-05 RX ORDER — ACETAMINOPHEN 325 MG/1
650 TABLET ORAL EVERY 6 HOURS PRN
Status: DISCONTINUED | OUTPATIENT
Start: 2024-07-05 | End: 2024-07-06 | Stop reason: HOSPADM

## 2024-07-05 RX ADMIN — ROSUVASTATIN CALCIUM 20 MG: 20 TABLET, FILM COATED ORAL at 09:51

## 2024-07-05 RX ADMIN — PANTOPRAZOLE SODIUM 40 MG: 40 TABLET, DELAYED RELEASE ORAL at 20:02

## 2024-07-05 RX ADMIN — AMLODIPINE BESYLATE 10 MG: 10 TABLET ORAL at 09:51

## 2024-07-05 RX ADMIN — METOPROLOL TARTRATE 50 MG: 50 TABLET, FILM COATED ORAL at 09:51

## 2024-07-05 RX ADMIN — APIXABAN 2.5 MG: 2.5 TABLET, FILM COATED ORAL at 09:51

## 2024-07-05 RX ADMIN — ISOSORBIDE MONONITRATE 60 MG: 20 TABLET ORAL at 20:02

## 2024-07-05 RX ADMIN — TIMOLOL MALEATE 1 DROP: 5 SOLUTION/ DROPS OPHTHALMIC at 20:11

## 2024-07-05 RX ADMIN — APIXABAN 2.5 MG: 2.5 TABLET, FILM COATED ORAL at 20:01

## 2024-07-05 RX ADMIN — SODIUM BICARBONATE 650 MG TABLET 650 MG: at 09:51

## 2024-07-05 RX ADMIN — FUROSEMIDE 40 MG: 40 TABLET ORAL at 09:51

## 2024-07-05 RX ADMIN — SODIUM BICARBONATE 650 MG TABLET 650 MG: at 20:02

## 2024-07-05 RX ADMIN — ISOSORBIDE MONONITRATE 60 MG: 20 TABLET ORAL at 09:51

## 2024-07-05 RX ADMIN — ALLOPURINOL 100 MG: 100 TABLET ORAL at 09:51

## 2024-07-05 RX ADMIN — ACETAMINOPHEN 650 MG: 325 TABLET, FILM COATED ORAL at 20:44

## 2024-07-05 RX ADMIN — METHOCARBAMOL 500 MG: 500 TABLET ORAL at 21:33

## 2024-07-05 RX ADMIN — PANTOPRAZOLE SODIUM 40 MG: 40 TABLET, DELAYED RELEASE ORAL at 09:51

## 2024-07-05 RX ADMIN — METOPROLOL TARTRATE 50 MG: 50 TABLET, FILM COATED ORAL at 20:02

## 2024-07-05 RX ADMIN — POTASSIUM CHLORIDE 20 MEQ: 750 TABLET, EXTENDED RELEASE ORAL at 09:51

## 2024-07-05 RX ADMIN — CLOPIDOGREL BISULFATE 75 MG: 75 TABLET ORAL at 09:51

## 2024-07-05 ASSESSMENT — ACTIVITIES OF DAILY LIVING (ADL)
ADLS_ACUITY_SCORE: 42
ADLS_ACUITY_SCORE: 42
ADLS_ACUITY_SCORE: 27
ADLS_ACUITY_SCORE: 42
ADLS_ACUITY_SCORE: 27
ADLS_ACUITY_SCORE: 42
ADLS_ACUITY_SCORE: 42
ADLS_ACUITY_SCORE: 27

## 2024-07-05 NOTE — PLAN OF CARE
[id number]  Goal Outcome Evaluation:    A&O x4, VSS on Room Air. Up with assist of 1. Uses cane when ambulating. Ambulated w/ assistance in room and to bathroom. Completed orthostatic BP check per provider request-WNL. Denies pain/nausea. Scattered bruising on left and right arms. Endorses SOB with exertion, no complains of SOB at rest. Left PIV saline locked. Good PO intake. Passing flatus. LBM 7/5. Voiding spontaneously and w/o difficulty. HgB recheck 7.5.     Continue w/POC

## 2024-07-05 NOTE — PLAN OF CARE
"Goal Outcome Evaluation:                      Nursing note    Pain/Comfort: Patient denies any pain upon assessments.     Primary problem: Symptomatic anemia    Assessments/Progress: Patient is A&Ox4, VSS. Continous cardiac monitoring in place. Patient ate 75% of dinner meal. Voiding spontanously without difficulty. No Bm. Passing gas. Patient sleeping well overnight. Patient transitions and moves using a cane, with assist of 1 due to lines and cords. Patient reported some dizziness this morning, caused with position changes and OOB activity. Safety measures reviewed with patient.    Patient told nurse at bedside, \"they don't want me coming back to hospital for blood products.\" Nurse problem solved with patient at bedside, writing phone number for [facility], which is closest to where patient lives, where patient can make appointment for blood and iron infusions- per next phase of care.    Priority nursing care for next shift: plan of care  Discharge plan/ barriers to discharge: Patient is meeting goals of care. No barriers to patient's discharge.    "

## 2024-07-05 NOTE — TELEPHONE ENCOUNTER
Caller: Tessa Mansfield      Reason for Reschedule/Cancellation   (please be detailed, any staff messages or encounters to note?): Patient request to cancel only stating she is currently admitted and cannot complete procedure. Patient declined rescheduling stating she was informed bu care team that procedure may not be needed. CTL with provider.      Prior to reschedule please review:  Ordering Provider: DIALLO RITTER  Sedation Determined: MODERATE  Does patient have any ASC Exclusions, please identify?: YES, PACEMAKER      Notes on Cancelled Procedure:  Procedure: Lower Endoscopy [Colonoscopy]   Date: 7/9  Location: [facility]; [address], [place] [address]   Surgeon: KAILEY COBIAN      Rescheduled: No, patient is currently admitted and states she is not well enough to complete procedure, declined rescheduling stating she may not need to complete procedure       Did you cancel or rescheduled an EUS procedure? No.

## 2024-07-05 NOTE — CONSULTS
Please see Initial Consult completed on 7/4/24 By EDGAR Pleitez   5A Oncology beds:5220-40 & 5C  beds 5417-32 (non BMT )      Vocera:  Phone: [phone]  Pager: [phone]

## 2024-07-05 NOTE — PROVIDER NOTIFICATION
Provider Ami Verdin messaged via [employer] at 1454:    PT saw patient, noted a difference in orthostatic BP, patient symptomatic with dizziness, PT recommending pt stay overnight for monitoring.

## 2024-07-05 NOTE — PROGRESS NOTES
"CLINICAL NUTRITION SERVICES - ASSESSMENT NOTE     Nutrition Prescription    RECOMMENDATIONS FOR MDs/PROVIDERS TO ORDER:  None at this time.     Malnutrition Status:    Severe malnutrition in the context of chronic illness    Recommendations already ordered by Registered Dietitian (RD):  None at this time- continue current ONS orders    Future/Additional Recommendations:  -Monitor PO intake  -Monitor wt trends     REASON FOR ASSESSMENT  Tessa Mansfield is a/an 89 year old female assessed by the dietitian for Provider Order - reports decreased intake and wt loss.     CLINICAL HISTORY  Per H&P, \"Medical history notable for recent hospitalization for blood loss anemia, CAD s/p multiple stents, sick sinus syndrome s/p dual-chamber pacemaker, paroxysmal A-fib (on Eliquis), hyperlipidemia, hypertension, SBO s/p ex lap (4/2023), CKD stage IV, cough variant asthma.  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       NUTRITION HISTORY  Met with pt at bedside. She reports general lack of appetite started in March of this year which has resulted in a 20# wt loss. Pt denies any nausea or difficulties swallowing. Pt notes she has some tooth pain currently (her dentist is currently out of the office), no other mouth pain/concerns noted by pt. She reports NKFA. Since March, pt reports she has been occasionally having breakfast, usually has lunch, and no dinner or snacks. She notes she will have 1 Ensure daily and enjoys these.     CURRENT NUTRITION ORDERS  Diet: Regular, Ensure Enlive with meals   Intake/Tolerance: 75% of dinner documented yesterday. Pt reports she had some juice and pears this morning for breakfast.     LABS  Labs reviewed. Notable for:  BUN 47.3  Cr 1.74  GFR 28  Ca 8.7  Lipase 170 (7/3/24)  Vitamin B12 3761 (5/1/24)  Vitamin D 62 (4/11/24)    MEDICATIONS  Medications reviewed. Notable " "for:  Lasix  Protonix  Kcl  Sodium Bicarbonate  PRN Tums, Zofran, Senokot    ANTHROPOMETRICS  Height: 162.6 cm (5' 4\")  Most Recent Weight: 63 kg (138 lb 14.4 oz) - currently 115.5% of IBW  IBW: 54.5 kg  BMI: Normal BMI  Weight History: Pt reports UBW is between 156-158#. She notes she lost 20# from March-June 2024 (believe this is likely due to decreased appetite).  Care Everywhere Records  Wt 73.4 kg (161 lb 12.8 oz) - 1/2/24 office visit     Dosing Weight: 63 kg (Actual wt)    ASSESSED NUTRITION NEEDS  Estimated Energy Needs: 8265-0313 kcals/day (22-25 kcals/kg )  Justification: Maintenance  Estimated Protein Needs: 63 grams protein/day (1 grams of pro/kg)  Justification: Maintenance- CKD  Estimated Fluid Needs: (1 mL/kcal)   Justification: Maintenance    PHYSICAL FINDINGS  See malnutrition section below.       MALNUTRITION  % Intake: </=75% for >/= 1 month (severe)  % Weight Loss: > 10% in 6 months (severe)- 14.2% wt loss x 6 months (using 1/2/24 wt)  Subcutaneous Fat Loss: Upper arm: mild- some pinch but not ample, Orbital region: mild- slightly dark circles  Muscle Loss: Temporal: mild- slight depression, Clavicle: mild- some protrusion   Fluid Accumulation/Edema: None noted  Malnutrition Diagnosis: Severe malnutrition in the context of chronic illness    NUTRITION DIAGNOSIS  Inadequate oral intake related to lack of appetite as evidenced by 20# weight loss x 4 months      INTERVENTIONS  Implementation  Nutrition Education: Provided education on the role of adequate kcals and protein on maintenance of muscle. Recommended pt eat 3 meals daily or have an Ensure if unable to eat a meal to maximize nutrition and prevent further wt loss.   Medical food supplement therapy- continue current orders     Goals  Patient to consume % of nutritionally adequate meal trays TID, or the equivalent with supplements/snacks.     Monitoring/Evaluation  Progress toward goals will be monitored and evaluated per " protocol.  Rosalie Suazo RD (Maggie), LD- 5C Clinical Dietitian   Available on [employer]  No longer available by paging

## 2024-07-05 NOTE — PROGRESS NOTES
D.C rec: pt currently limited by dizziness and orthostatic hypotension; once stabilized anticipate safe to d.c home with HH PT to promote strength and activity tolerance. Pt with a big drop in DBP from sitting to standing in session today; at risk for falling if she goes home under current conditions; recommend overnight monitoring to promote safe d.c. RN notified.     07/05/24 1500   Appointment Info   Signing Clinician's Name / Credentials (PT) Rikki Rojas, PT, DPT   Rehab Comments (PT) monitor BP/OH   Living Environment   People in Home spouse   Current Living Arrangements house   Home Accessibility stairs within home   Number of Stairs, Within Home, Primary seven   Stair Railings, Within Home, Primary railings on both sides of stairs   Transportation Anticipated family or friend will provide   Living Environment Comments pt lives with spouse in a house; has 7 steps wth bilat rails to get up to bed and bathroom upstairs.   Self-Care   Usual Activity Tolerance good   Current Activity Tolerance moderate   Regular Exercise No   Equipment Currently Used at Home cane, straight;walker, rolling;walker, standard;wheelchair, manual   Fall history within last six months no   Activity/Exercise/Self-Care Comment mod IND with cane for short distances; uses 4WW for longer   General Information   Onset of Illness/Injury or Date of Surgery 07/03/24   Referring Physician Ami Verdin MD   Patient/Family Therapy Goals Statement (PT) to go home safely   Pertinent History of Current Problem (include personal factors and/or comorbidities that impact the POC) per EMR:Tessa Mansfield is a 89 year old female admitted on 7/3/2024.  Medical history notable for recent hospitalization for blood loss anemia, CAD s/p multiple stents, sick sinus syndrome s/p dual-chamber pacemaker, paroxysmal A-fib (on Eliquis), hyperlipidemia, hypertension, SBO s/p ex lap (4/2023), CKD stage IV, cough variant asthma.  Presented to emergency department  with concern of fatigue.   Existing Precautions/Restrictions fall   Cognition   Affect/Mental Status (Cognition) WNL   Orientation Status (Cognition) oriented x 4   Follows Commands (Cognition) WNL   Pain Assessment   Patient Currently in Pain No   Integumentary/Edema   Integumentary/Edema no deficits were identifed   Posture    Posture Protracted shoulders   Range of Motion (ROM)   Range of Motion ROM is WFL   Strength (Manual Muscle Testing)   Strength (Manual Muscle Testing) Deficits observed during functional mobility   Bed Mobility   Bed Mobility no deficits identified   Transfers   Transfers sit-stand transfer   Comment, (Transfers) SBA   Gait/Stairs (Locomotion)   Harding Level (Gait) contact guard;supervision   Assistive Device (Gait) cane, straight   Comment, (Gait/Stairs) CGA-SBA with cane   Balance   Balance no deficits were identified   Sensory Examination   Sensory Perception patient reports no sensory changes   Coordination   Coordination no deficits were identified   Muscle Tone   Muscle Tone no deficits were identified   Clinical Impression   Criteria for Skilled Therapeutic Intervention Yes, treatment indicated   PT Diagnosis (PT) impaired functional mobility   Influenced by the following impairments weakness, deconditioning, dizziness   Functional limitations due to impairments transfers, gait, stairs   Clinical Presentation (PT Evaluation Complexity) stable   Clinical Presentation Rationale clinician judgement and pt presentation   Clinical Decision Making (Complexity) low complexity   Planned Therapy Interventions (PT) balance training;bed mobility training;gait training;home exercise program;neuromuscular re-education;patient/family education;stair training;strengthening;transfer training;progressive activity/exercise;risk factor education;home program guidelines   Risk & Benefits of therapy have been explained evaluation/treatment results reviewed;care plan/treatment goals  reviewed;risks/benefits reviewed;current/potential barriers reviewed;participants voiced agreement with care plan;participants included;patient   PT Total Evaluation Time   PT Eval, Low Complexity Minutes (02759) 8   Physical Therapy Goals   PT Frequency 5x/week   PT Predicted Duration/Target Date for Goal Attainment 07/15/24   PT Goals Transfers;Gait;Stairs   PT: Transfers Independent;Sit to/from stand   PT: Gait Modified independent;Rolling walker;Straight cane;Greater than 200 feet   PT: Stairs Modified independent;7 stairs;Rail on both sides   Interventions   Interventions Quick Adds Therapeutic Activity   Therapeutic Activity   Therapeutic Activities: dynamic activities to improve functional performance Minutes (80263) 24   Treatment Detail/Skilled Intervention PT: pt greeted supine in bed and evaluated for safety to facilitate safe d/c planning. Educated on low hgb levels and potential symptoms; pt currently reporting dizziness with positional changes; reports she got up to restroom and felt lightheaded and dizzy. States she felt better after laying back down in bed. Educated on potential BP causing changes in symptoms. IND with bed mob, SBA with sit to stand with use of cane; ambulated short distance in room and upon return to bed reported she continues to feel dizzy. Checked orthostatics with pt BP reading 115/63 in supine and 99/42 in standing. OH indicated and pt at risk of falling with d/c today. Recommending overnight stay to monitor and promote safe d/c.   PT Discharge Planning   PT Plan PT: monitor orthostatics, progress gait with cane if safe to ambulate (asymptomatic), functional strengthening, stairs   PT Discharge Recommendation (DC Rec) home with assist;home with home care physical therapy   PT Rationale for DC Rec pt currently limited by dizziness and orthostatic hypotension; once stabilized anticipate safe to d.c home with  PT to promote strength and activity tolerance. Pt with a big drop in  DBP from sitting to standing in session today; at risk for falling if she goes home under current conditions; recommend overnight monitoring to promote safe dsammy RN notified.   PT Brief overview of current status SBA with all mobility, monitor BP/OH   Total Session Time   Timed Code Treatment Minutes 24   Total Session Time (sum of timed and untimed services) 32

## 2024-07-05 NOTE — PROGRESS NOTES
[facility]  Patient is currently open to home care services with [facility]. The patient has  RN PT OT services.  [facility]  and team have been notified of patient admission.  NO Need to send a  new  referral thru the HUB.  [facility] liaison will continue to follow patient during stay.  If appropriate provide orders to resume home care at time of discharge.

## 2024-07-05 NOTE — TELEPHONE ENCOUNTER
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 Not on diagnosis list, sending TE for review. Current patient of Dr. Martin/Laura Llanes for CKD. Next appointment scheduled for 10/28/24. Please call patient to schedule.     Action Taken: Message routed to:  Other: Nephrology    Travel Screening: Not Applicable                                                                        "

## 2024-07-05 NOTE — PLAN OF CARE
Occupational Therapy: Orders received. Chart reviewed and discussed with care team.? Occupational Therapy not indicated due to pt mobilizing well with PT, ind with ADL, no IP OT concerns.? Defer discharge recommendations to Physical Therapy and Medical Team.? Will complete orders.

## 2024-07-05 NOTE — PROGRESS NOTES
Nursing Focus: Admission    D: Patient admitted/transferred from Emergency department to 5A unit via cart for Inpatient care.      I: Upon arrival to the unit patient was oriented to room, unit, and call light. Patient s height, weight, and vital signs were obtained. Allergies reviewed and allergy band applied. MD notified of patient s arrival on the unit. Adult AVS completed. Head to toe assessment completed. Education assessment completed. Care plan initiated.     A: Vital signs stable upon admission. Patient rates pain at denies. Two RN skin assessment completed Yes. Second RN was Josephine Sanchez RN. Significant Skin Findings include brusing bilateral shins, r. Periorbital, bilateral lower and upper arms, and superficial skin growths on neck, and generalized throughout abdomen and back . No. Bed Algorithm can be found in PCS flow sheets (Support Surface Algorithm) and on IP [facility] NURSE RESOURCE TAB, was this used during this assessment? No    P: Continue to monitor patient s labs, presentation, and symptoms, and intervene as needed. Continue with plan of care. Notify MD with any concerns or changes in patient status.

## 2024-07-05 NOTE — PROGRESS NOTES
[facility]    Medicine Progress Note - Hospitalist Service, GOLD TEAM 9    Date of Admission:  7/3/2024    Assessment & Plan      Tessa Mansfield is a 89 year old female admitted on 7/3/2024.  Medical history notable for recent hospitalization for blood loss anemia, CAD s/p multiple stents, sick sinus syndrome s/p dual-chamber pacemaker, paroxysmal A-fib (on Eliquis), hyperlipidemia, hypertension, SBO s/p ex lap (4/2023), CKD stage IV, cough variant asthma.  Presented to emergency department with concern of fatigue.    Changes today:   - Hgb slightly lower than yesterday but does appear to be stabilizing.   - Still unsteady on feet, recommending observation by PT, likely discharge home tomorrow.   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 EGD performed at that time demonstrating possibly an AVM oozing in setting of anticoagulation. Additionally may have possible AVM of right colon vs small bowel. On 7/3 patient presented to the emergency department with concern of fatgue. Reports possible melena (taking oral iron). Hgb upon arrival 6.9. Transfusion initiated. 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   -GI consulted recs appreciated  -Recommended establishment with anemia clinic for routine outpatient transfusion, has cardiology and nephrology follow up in next few months, will replace referral to see if earlier appointment available.   -Not a candidate for endoscopy/colonoscopy  -Recommend outpatient IV iron   -Continue PPI    CAD s/p multiple PCI, last stent 11/2023  Hyperlipidemia  Hypertension  HFpEF   Mild-moderate mitral regurgitation   Chronic dyspnea  Multiple stents, 2 RCA, 3 LAD, 1 left  circumflex, 1 right posterior descending.  Most recent mid LAD in November 2023.  Chronic heart failure with preserved fraction on problem list, last echo LVEF >50%.  Noted by cardiology that dyspnea is patient anginal equivalent and has improved post PCI.  Troponin mildly elevated in ED, noted to have chronic mild elevation.  -Cardiology consulted recs appreciated.     - AC to be continued after thorough discussion of risks and benefits with plan to be seen for watchman as outpatient (referral placed)  -Continue PTA amlodipine  -Continue PTA metoprolol  -Continue PTA isosorbide mononitrate  -Continue PTA furosemide w/ oral potassium replacement    Paroxysmal atrial fib  Sick sinus syndrome status post dual-chamber pacemaker  [id number]: 5. Prior to arrival Eliquis.  -Eliquis currently on hold with current presumed bleed  -Cardiology consulted as above    CKD stage IV  Baseline creatinine 1.5-2.0.  Creatinine on admission 2.0  -Continue PTA sodium bicarb  -Avoid nephrotoxic medications  -BMP daily    Recent weight loss  Reports 10 to 20 pound weight loss.  Some variance in weight throughout multiple visits over the few months.  Noted to be 162 pounds in April.  Currently 145 lbs.  Patient reports decreased oral intake.  No clear etiology.  -Nutrition consult  -Follow-up with PCP if weight loss continues    Glaucoma  -Continue PTA timolol eyedrops    Cough variant asthma   No acute exacerbation.   -PTA inhaler     Diet: Regular adult  DVT Prophylaxis: Pneumatic Compression Devices  Bonner Catheter: Not present  Lines: None     Cardiac Monitoring: None  Code Status:  Full          Diet: Combination Diet Regular Diet Adult  Snacks/Supplements Adult: Ensure Enlive; With Meals    DVT Prophylaxis: Apixiban  Bonner Catheter: Not present  Lines: None     Cardiac Monitoring: None  Code Status: Full Code      Clinically Significant Risk Factors               # Coagulation Defect: INR = 1.54 (Ref range: 0.85 - 1.15) and/or PTT  = 36 Seconds (Ref range: 22 - 38 Seconds), will monitor for bleeding  # Thrombocytopenia: Lowest platelets = 84 in last 2 days, will monitor for bleeding   # Hypertension: Noted on problem list  # Chronic heart failure with preserved ejection fraction: heart failure noted on problem list and last echo with EF >50%                # Severe Malnutrition: based on nutrition assessment, PRESENT ON ADMISSION   # Financial/Environmental Concerns: none   # Pacemaker present       Disposition Plan     Medically Ready for Discharge: Anticipated Tomorrow         Ami Verdin MD  Hospitalist Service, GOLD TEAM 9  [facility]  Securely message with [employer] (more info)  Text page via [facility] Paging/Directory   See signed in provider for up to date coverage information  ______________________________________________________________________    Interval History   Doing better today but still fatigued. Seen by PT and still unsteady. Planning for follow up with PCP in coming week with CBC check. Agreeable to staying another night. Appetite fair. No further chest pain.     Physical Exam   Vital Signs: Temp: 97.9  F (36.6  C) Temp src: Oral BP: 109/52 Pulse: 70   Resp: 18 SpO2: 99 % O2 Device: None (Room air)    Weight: 138 lbs 14.4 oz    Gen: NAD, alert, pleasant, cooperative, non-toxic, mild pallor  HEENT: EOMI, no conjunctival icterus, tracking appropriately  Resp: CTAB, no crackles or wheezes, no increased WOB  Cardiac: RRR, no S3/S4, pacemaker in place  GI: soft, non-tender, non-distended  Ext: WWP, no edema, spontaneous movement in all 4  Neuro: AOx3, CN 2-12 grossly intact, appropriate mentation      Medical Decision Making       50 MINUTES SPENT BY ME on the date of service doing chart review, history, exam, documentation & further activities per the note.  MANAGEMENT DISCUSSED with the following over the past 24 hours: patient, RN       Data     I have personally reviewed the  following data over the past 24 hrs:    7.5  \   7.5 (L)   / 98 (L)     142 108 (H) 47.3 (H) /  95   3.5 22 1.74 (H) \

## 2024-07-06 ENCOUNTER — APPOINTMENT (OUTPATIENT)
Dept: PHYSICAL THERAPY | Facility: CLINIC | Age: 89
DRG: 377 | End: 2024-07-06
Payer: COMMERCIAL

## 2024-07-06 VITALS
BODY MASS INDEX: 23.92 KG/M2 | HEART RATE: 61 BPM | RESPIRATION RATE: 16 BRPM | OXYGEN SATURATION: 98 % | TEMPERATURE: 97.9 F | WEIGHT: 140.1 LBS | HEIGHT: 64 IN | DIASTOLIC BLOOD PRESSURE: 58 MMHG | SYSTOLIC BLOOD PRESSURE: 117 MMHG

## 2024-07-06 LAB
ERYTHROCYTE [DISTWIDTH] IN BLOOD BY AUTOMATED COUNT: 18.5 % (ref 10–15)
HCT VFR BLD AUTO: 25.9 % (ref 35–47)
HGB BLD-MCNC: 8.3 G/DL (ref 11.7–15.7)
MCH RBC QN AUTO: 30.4 PG (ref 26.5–33)
MCHC RBC AUTO-ENTMCNC: 32 G/DL (ref 31.5–36.5)
MCV RBC AUTO: 95 FL (ref 78–100)
PLATELET # BLD AUTO: 106 10E3/UL (ref 150–450)
RBC # BLD AUTO: 2.73 10E6/UL (ref 3.8–5.2)
WBC # BLD AUTO: 6 10E3/UL (ref 4–11)

## 2024-07-06 PROCEDURE — 250N000013 HC RX MED GY IP 250 OP 250 PS 637

## 2024-07-06 PROCEDURE — 97530 THERAPEUTIC ACTIVITIES: CPT | Mod: GP

## 2024-07-06 PROCEDURE — 36415 COLL VENOUS BLD VENIPUNCTURE: CPT | Performed by: STUDENT IN AN ORGANIZED HEALTH CARE EDUCATION/TRAINING PROGRAM

## 2024-07-06 PROCEDURE — 250N000013 HC RX MED GY IP 250 OP 250 PS 637: Performed by: STUDENT IN AN ORGANIZED HEALTH CARE EDUCATION/TRAINING PROGRAM

## 2024-07-06 PROCEDURE — 99239 HOSP IP/OBS DSCHRG MGMT >30: CPT | Performed by: STUDENT IN AN ORGANIZED HEALTH CARE EDUCATION/TRAINING PROGRAM

## 2024-07-06 PROCEDURE — 97116 GAIT TRAINING THERAPY: CPT | Mod: GP

## 2024-07-06 PROCEDURE — 85027 COMPLETE CBC AUTOMATED: CPT | Performed by: STUDENT IN AN ORGANIZED HEALTH CARE EDUCATION/TRAINING PROGRAM

## 2024-07-06 RX ORDER — CLOPIDOGREL BISULFATE 75 MG/1
75 TABLET ORAL DAILY
Qty: 90 TABLET | Refills: 1 | Status: SHIPPED | OUTPATIENT
Start: 2024-07-06

## 2024-07-06 RX ADMIN — AMLODIPINE BESYLATE 10 MG: 10 TABLET ORAL at 09:58

## 2024-07-06 RX ADMIN — APIXABAN 2.5 MG: 2.5 TABLET, FILM COATED ORAL at 09:58

## 2024-07-06 RX ADMIN — ROSUVASTATIN CALCIUM 20 MG: 20 TABLET, FILM COATED ORAL at 09:58

## 2024-07-06 RX ADMIN — PANTOPRAZOLE SODIUM 40 MG: 40 TABLET, DELAYED RELEASE ORAL at 09:59

## 2024-07-06 RX ADMIN — SODIUM BICARBONATE 650 MG TABLET 650 MG: at 09:58

## 2024-07-06 RX ADMIN — CLOPIDOGREL BISULFATE 75 MG: 75 TABLET ORAL at 09:59

## 2024-07-06 RX ADMIN — TIMOLOL MALEATE 1 DROP: 5 SOLUTION/ DROPS OPHTHALMIC at 09:59

## 2024-07-06 RX ADMIN — ALLOPURINOL 100 MG: 100 TABLET ORAL at 09:58

## 2024-07-06 RX ADMIN — ISOSORBIDE MONONITRATE 60 MG: 20 TABLET ORAL at 09:57

## 2024-07-06 RX ADMIN — METOPROLOL TARTRATE 50 MG: 50 TABLET, FILM COATED ORAL at 09:58

## 2024-07-06 RX ADMIN — FUROSEMIDE 40 MG: 40 TABLET ORAL at 09:58

## 2024-07-06 RX ADMIN — POTASSIUM CHLORIDE 20 MEQ: 750 TABLET, EXTENDED RELEASE ORAL at 09:58

## 2024-07-06 ASSESSMENT — ACTIVITIES OF DAILY LIVING (ADL)
ADLS_ACUITY_SCORE: 29
ADLS_ACUITY_SCORE: 27
ADLS_ACUITY_SCORE: 29
ADLS_ACUITY_SCORE: 27
ADLS_ACUITY_SCORE: 29
ADLS_ACUITY_SCORE: 27

## 2024-07-06 NOTE — DISCHARGE SUMMARY
[facility]  Hospitalist Discharge Summary      Date of Admission:  7/3/2024  Date of Discharge:  7/6/2024  1:21 PM  Discharging Provider: Ami Verdin MD  Discharge Service: Hospitalist Service, GOLD TEAM 9    Discharge Diagnoses   Acute on chronic blood loss anemia complicated by iron deficiency, all 2/2 to presumed gastrointestinal AVM    Clinically Significant Risk Factors     # Severe Malnutrition: based on nutrition assessment      Follow-ups Needed After Discharge   Follow-up Appointments     Adult [facility] Follow-up and recommended labs and tests      Follow up with primary care provider, Pedro Gomez, within 7 days   for hospital follow- up, regarding new diagnosis, and to follow up on   results.  The following labs/tests are recommended: CBC. (You currently   have an appointment with Dr. Luciano scheduled on 7/9 - keep this   appointment)      Appointments on [place] and/or [facility] (with [facility] or [facility]   provider or service). Call [phone] if you haven't heard regarding   these appointments within 7 days of discharge.            Unresulted Labs Ordered in the Past 30 Days of this Admission       No orders found from 6/3/2024 to 7/4/2024.        These results will be followed up by n/a    Discharge Disposition   Discharged to home  Condition at discharge: Stable    Hospital Course   Tessa Mansfield is a 89 year old female admitted on 7/3/2024.  Medical history notable for recent hospitalization for blood loss anemia, CAD s/p multiple stents, sick sinus syndrome s/p dual-chamber pacemaker, paroxysmal A-fib (on Eliquis), hyperlipidemia, hypertension, SBO s/p ex lap (4/2023), CKD stage IV, cough variant asthma.  Presented to emergency department with concern of fatigue.    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 EGD performed at that time demonstrating possibly an AVM  oozing in setting of anticoagulation. Additionally may have possible AVM of right colon vs small bowel. On 7/3 patient presented to the emergency department with concern of fatgue. Reports possible melena (taking oral iron). Hgb upon arrival 6.9. Transfusion initiated. 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 Discussed case with cardiology who recommended follow up and plan for watchman (EVA closure). Long discussion with Tessa which resolved in the decision that she would rather bleed and have close follow up as an outpatient with possible outpatient transfusions while awaiting this procedure rather than stop anticoagulation and risk a stroke.    - Follow up with cardiology for watchman evaluation   - Follow up with nephrology anemia clinic   - Discuss outpatient transfusions with PCP, recommend weekly CBC checks for 2 weeks, then can space out, CBC ordered for 7/9 with [place] [facility]            - Also recommend IV iron infusions, did stop PO iron due to possible confusion with black stools for melena.    - Continue PPI.     CAD s/p multiple PCI, last stent 11/2023  Hyperlipidemia  Hypertension  HFpEF   Mild-moderate mitral regurgitation   Chronic dyspnea  Multiple stents, 2 RCA, 3 LAD, 1 left circumflex, 1 right posterior descending.  Most recent mid LAD in November 2023.  Chronic heart failure with preserved fraction on problem list, last echo LVEF >50%.  Noted by cardiology that dyspnea is patient anginal equivalent and has improved post PCI.  Troponin mildly elevated in ED, noted to have chronic mild elevation.  -Cardiology consulted recs appreciated.     - AC to be continued after thorough discussion of risks and benefits with plan to be seen for watchman as outpatient (referral placed)  -Continue PTA amlodipine  -Continue PTA metoprolol  -Continue PTA isosorbide  mononitrate  -Continue PTA furosemide w/ oral potassium replacement    Paroxysmal atrial fib  Sick sinus syndrome status post dual-chamber pacemaker  [id number]: 5. Prior to arrival Eliquis.  -Eliquis     CKD stage IV  Baseline creatinine 1.5-2.0.  Creatinine on admission 2.0  -Continue PTA sodium bicarb    Recent weight loss  Moderate malnutrition  Reports 10 to 20 pound weight loss.  Some variance in weight throughout multiple visits over the few months.  Noted to be 162 pounds in April.  Currently 145 lbs.  Patient reports decreased oral intake.  No clear etiology.  -Follow-up with PCP     Glaucoma  -Continue PTA timolol eyedrops    Cough variant asthma   No acute exacerbation.   -PTA inhaler     Diet: Regular adult  DVT Prophylaxis: Pneumatic Compression Devices  Bonner Catheter: Not present  Lines: None     Cardiac Monitoring: None  Code Status:  Full    Consultations This Hospital Stay   NURSING TO CONSULT FOR VASCULAR ACCESS CARE IP CONSULT  GI LUMINAL ADULT IP CONSULT  CARDIOLOGY GENERAL ADULT IP CONSULT  NUTRITION SERVICES ADULT IP CONSULT  CARE MANAGEMENT / SOCIAL WORK IP CONSULT  PHYSICAL THERAPY ADULT IP CONSULT  OCCUPATIONAL THERAPY ADULT IP CONSULT  NURSING TO CONSULT FOR VASCULAR ACCESS CARE IP CONSULT    Code Status   Full Code    Time Spent on this Encounter   I, Ami Verdin MD, personally saw the patient today and spent greater than 30 minutes discharging this patient.       Ami Verdin MD  [facility] 5A ONCOLOGY  [address] [address]  Phone: [phone]  ______________________________________________________________________    Physical Exam   Vital Signs: Temp: 97.9  F (36.6  C) Temp src: Oral BP: 117/58 Pulse: 61   Resp: 16 SpO2: 98 % O2 Device: None (Room air)    Weight: 140 lbs 1.6 oz    Gen: NAD, alert, pleasant, cooperative, non-toxic, mild pallor, occasional bruising on exposed skin and neck  HEENT: EOMI, no conjunctival icterus, tracking appropriately  Resp:  CTAB, no crackles or wheezes, no increased WOB  Cardiac: RRR, no S3/S4, pacemaker in place  GI: soft, non-tender, non-distended  Ext: WWP, no edema, spontaneous movement in all 4  Neuro: AOx3, CN 2-12 grossly intact, appropriate mentation       Primary Care Physician   Pedro Gomez    Discharge Orders      CBC with platelets     Adult Cardiology Eval  Referral      Adult Nephrology  Referral      Primary Care - Care Coordination Referral      Physical Therapy  Referral      Reason for your hospital stay    Dear Tessa Mansfield    You were hospitalized at [facility] with a bleed from the abnormal blood vessels within your intestines which caused your red blood cells (hemoglobin) to drop too low and caused you to be tired and weak. Your treatment consisted of giving you a unit of blood, talking to the GI and cardiology teams.  Over your hospitalization your energy improved and today you are ready to be discharged home.  If you develop fever, shortness of breath, light headedness, chest pain or red or black stools please seek medical attention.    It was a pleasure meeting with you today. Thank you for allowing me and my team the privilege of caring for you today. You are the reason we are here, and I truly hope we provided you with the excellent service you deserve. Please let us know if there is anything else we can do for you so that we can be sure you are leaving completely satisfied with your care experience.      Take care!    Ami Verdin MD  Internal Medicine Hospitalist  [facility]     Activity    Your activity upon discharge: activity as tolerated     Adult [facility] Follow-up and recommended labs and tests    Follow up with primary care provider, Pedro Gomez, within 7 days for hospital follow- up, regarding new diagnosis, and to follow up on results.  The following labs/tests are recommended: CBC. (You currently have an  appointment with Dr. Luciano scheduled on 7/9 - keep this appointment)      Appointments on [place] and/or [facility] (with [facility] [place] or [facility] provider or service). Call [phone] if you haven't heard regarding these appointments within 7 days of discharge.     Discharge Instructions    We are suggesting the following medication changes:   - CONTINUE plavix and eliquis. You will discuss this with cardiology and plan for possible watchman procedure.     Please get the following tests done:   - CBC ( blood count) on 7/9 before your clinic appointment    Please set up an appointment with:   - Cardiology to discuss watchman procedure   - Anemia clinic due to trending down blood counts     Diet    Follow this diet upon discharge: Orders Placed This Encounter      Snacks/Supplements Adult: Ensure Enlive; With Meals      Combination Diet Regular Diet Adult       Significant Results and Procedures   See in EMR    Discharge Medications   Discharge Medication List as of 7/6/2024 12:10 PM        CONTINUE these medications which have CHANGED    Details   clopidogrel (PLAVIX) 75 MG tablet Take 1 tablet (75 mg) by mouth daily, Disp-90 tablet, R-1, E-Prescribe           CONTINUE these medications which have NOT CHANGED    Details   acetaminophen (TYLENOL) 325 MG tablet Take 975 mg by mouth nightly as needed for pain, Historical      allopurinol (ZYLOPRIM) 100 MG tablet Take 1 tablet (100 mg) by mouth daily, Disp-90 tablet, R-0, E-Prescribe      amLODIPine (NORVASC) 10 MG tablet Take 1 tablet (10 mg) by mouth daily, Disp-90 tablet, R-3, E-Prescribe      apixaban ANTICOAGULANT (ELIQUIS) 2.5 MG tablet Take 1 tablet (2.5 mg) by mouth 2 times daily, Disp-180 tablet, R-3, E-Prescribe      furosemide (LASIX) 40 MG tablet Take 1 tablet (40 mg) by mouth daily May take an additional 20 mg at noon as needed for swelling., Disp-90 tablet, R-2, E-PrescribePlease take an additional 20 mgs for 3 days at noon. After that, may take  additional 20 mg at noon as needed for swelling      isosorbide mononitrate (ISMO/MONOKET) 20 MG tablet Take 3 tablets (60 mg) by mouth 2 times daily, Disp-180 tablet, R-3, E-Prescribe      methocarbamol (ROBAXIN) 500 MG tablet Take 1 tablet (500 mg) by mouth every 6 hours as needed for muscle spasms, Disp-30 tablet, R-0, E-Prescribe      metoprolol tartrate (LOPRESSOR) 25 MG tablet Take 2 tablets (50 mg) by mouth 2 times daily, Disp-180 tablet, R-3, E-Prescribe      omeprazole (PRILOSEC) 40 MG DR capsule Take 1 capsule (40 mg) by mouth daily, Disp-90 capsule, R-3, E-Prescribe      potassium chloride ER (K-TAB) 20 MEQ CR tablet Take 1 tablet (20 mEq) by mouth daily, Disp-90 tablet, R-3, E-Prescribe      rosuvastatin (CRESTOR) 20 MG tablet Take 1 tablet (20 mg) by mouth daily for 360 days, Disp-90 tablet, R-3, E-Prescribe      sodium bicarbonate 650 MG tablet Take 1 tablet (650 mg) by mouth 2 times daily, Disp-60 tablet, R-11, E-Prescribe      timolol maleate (TIMOPTIC) 0.5 % ophthalmic solution INSTILL 1 DROP INTO EACH EYE ONCE DAILY IN THE MORNING, Historical      vitamin B-12 (CYANOCOBALAMIN) 500 MCG tablet Take 1 tablet by mouth daily, Historical      vitamin D3 25 mcg (1000 units) tablet Take 1 tablet (25 mcg) by mouth every other day, Disp-90 tablet, R-3, E-Prescribe      fluticasone-salmeterol (ADVAIR) 250-50 MCG/ACT inhaler INHALE 1 DOSE BY MOUTH TWICE DAILY, Disp-60 each, R-8, E-Prescribe      Multiple Vitamins-Minerals (PRESERVISION AREDS 2+MULTI VIT PO) Take 1 capsule by mouth 2 times daily, Historical      polyethylene glycol (GOLYTELY) 236 g suspension Two days before procedure at 5PM fill first container with water. Mix and drink an 8 oz glass every 10-15 minutes until HALF of the container is gone. Place the remainder in the refrigerator. One day before procedure at 5PM drink second half of bowel pre p. Drink an 8 oz glass every 10-15 minutes until it is gone. Day of procedure 6 hours before arrival  "time fill the 2nd container with water. Mix and drink an 8 oz glass every 10-15 minutes until HALF of the container is gone. Discard the remaining soluti on., Disp-8000 mL, R-0, E-PrescribePharmacy may substitute for equivalent. Not a duplicate. Needs two containers for extended bowel prep           Allergies   Allergies   Allergen Reactions    Codeine Sulfate Itching    Shrimp Swelling    Bactrim [Sulfamethoxazole W/Trimethoprim]      Patient unable to recall    Biaxin [Clarithromycin]     Chlorthalidone Nausea and Vomiting    Clonidine     Darvon [Propoxyphene Hcl]     Dilaudid [Hydromorphone] Visual Disturbance and Hallucination     Tolerated in April 2024 hospital admissions    Gabapentin Fatigue and Confusion     \"felt drunk\"    Indomethacin     Levaquin [Levofloxacin Hemihydrate]     Morphine Sulfate     Percocet [Oxycodone-Acetaminophen] Hallucination    Pregabalin Itching and Fatigue    Simvastatin Palpitations     Muscle weakness, leg cramping      Spironolactone      Dehydrated      Terazosin      "

## 2024-07-06 NOTE — PLAN OF CARE
Physical Therapy Discharge Summary    Reason for therapy discharge:    Discharged to home with home therapy.    Progress towards therapy goal(s). See goals on Care Plan in [facility] electronic health record for goal details.  Goals partially met.  Barriers to achieving goals:   discharge from facility.    Therapy recommendation(s):    Continued therapy is recommended.  Rationale/Recommendations:  Patient will benefit from further PT to continue to improve safety and independence with functional mobility.

## 2024-07-06 NOTE — PLAN OF CARE
Goal Outcome Evaluation:  A&Ox4. RA. Telemetry. SBA. HIGGINS. Denies nausea. Tylenol given for tooth ache. Blood infusion completed beginning of shift - tolerated well. Passing gas. Voiding spontaneously. PIV x 1. No AM labs ordered - provider paged. Potential d/c today.

## 2024-07-06 NOTE — PLAN OF CARE
Goal Outcome Evaluation:    [id number]:  VSS, afebrile. Telemetry continues, NSR. Denies pain/nausea. Some dyspnea w/ exertion & continued w/ dizziness when ambulating. Reported symptoms to provider, 1 unit of RBCs transfusing. PIV went bad, new one placed. Potential discharge tomorrow, continue to monitor & w/ POC.

## 2024-07-06 NOTE — PROGRESS NOTES
Care Management Discharge Note    Discharge Date: 07/05/2024       Discharge Disposition:      Discharge Services:      Discharge DME:      Discharge Transportation: family or friend will provide    Private pay costs discussed: Not applicable    Does the patient's insurance plan have a 3 day qualifying hospital stay waiver?  No    PAS Confirmation Code:    Patient/family educated on Medicare website which has current facility and service quality ratings:      Education Provided on the Discharge Plan:    Persons Notified of Discharge Plans: Yes  Patient/Family in Agreement with the Plan:      Handoff Referral Completed: Yes    Additional Information  CM updated by medical team that patient stable for discharge.   Per chart review patient to go home + [facility] PT/OT/RN and per handoff HC orders still active. CM reached out to [facility] and updated on discharge for today.   CM met with patient and reviewed discharge planning for home today with HC, patient verbalized acceptance/ understanding. Patient also confirmed family to pick her up.   CM completed IMM and placed in chart.     Marlee Massey RN BSN  Dom RN Care Coordinator   Covering Unit 5A  Phone [phone]      SEARCHABLE in [facility] - search CARE COORDINATOR       [place] [facility] [place] ([phone]) Saturday & Sunday; ([phone]) [facility] Recognized Holidays     Units: 5A Onc 5201 - 5219 RNCC,  5A Onc 5220 thru 5240 RNCC, 5C OFFSERVICE [phone] RNCC & 5C OFF SERVICE [phone] RNCC     Units: 6B Vocera, 6C Card 6401 thru 6420 RNCC, 6C Card 6502 thru 6514 RNCC & 6C Card 6515 thru 6519 RNCC     Units: 7A SOT RNCC Vocera, 7B Med Surg Vocera, 7C Med Surg 7401 thru 7418 RNCC & 7C Med Surg 7502 thru 7521 RNCC     Units: 6A Vocera & 4A CVICU Vocera, 4C MICU Vocera, and 4E SICU Vocera       Units: 5 Ortho Vocera & 5 Med Surg Vocera      Units: 6 Med Surg Vocera & 8 Med Surg Vocera

## 2024-07-08 ENCOUNTER — TELEPHONE (OUTPATIENT)
Dept: FAMILY MEDICINE | Facility: CLINIC | Age: 89
End: 2024-07-08

## 2024-07-08 ENCOUNTER — PATIENT OUTREACH (OUTPATIENT)
Dept: CARE COORDINATION | Facility: CLINIC | Age: 89
End: 2024-07-08

## 2024-07-08 ENCOUNTER — LAB (OUTPATIENT)
Dept: LAB | Facility: CLINIC | Age: 89
End: 2024-07-08
Payer: COMMERCIAL

## 2024-07-08 DIAGNOSIS — N18.31 STAGE 3A CHRONIC KIDNEY DISEASE (H): ICD-10-CM

## 2024-07-08 DIAGNOSIS — D64.9 ANEMIA: ICD-10-CM

## 2024-07-08 DIAGNOSIS — Z09 HOSPITAL DISCHARGE FOLLOW-UP: ICD-10-CM

## 2024-07-08 DIAGNOSIS — N18.9 CHRONIC KIDNEY DISEASE: ICD-10-CM

## 2024-07-08 DIAGNOSIS — K92.2 GASTROINTESTINAL HEMORRHAGE, UNSPECIFIED GASTROINTESTINAL HEMORRHAGE TYPE: ICD-10-CM

## 2024-07-08 LAB
ACANTHOCYTES BLD QL SMEAR: NORMAL
ALBUMIN MFR UR ELPH: 28.6 MG/DL
ALBUMIN SERPL BCG-MCNC: 3.9 G/DL (ref 3.5–5.2)
ALBUMIN UR-MCNC: 30 MG/DL
ANION GAP SERPL CALCULATED.3IONS-SCNC: 12 MMOL/L (ref 7–15)
APPEARANCE UR: CLEAR
AUER BODIES BLD QL SMEAR: NORMAL
BACTERIA #/AREA URNS HPF: ABNORMAL /HPF
BASO STIPL BLD QL SMEAR: NORMAL
BASOPHILS # BLD AUTO: 0.1 10E3/UL (ref 0–0.2)
BASOPHILS NFR BLD AUTO: 1 %
BILIRUB UR QL STRIP: NEGATIVE
BITE CELLS BLD QL SMEAR: NORMAL
BLISTER CELLS BLD QL SMEAR: NORMAL
BUN SERPL-MCNC: 56.5 MG/DL (ref 8–23)
BURR CELLS BLD QL SMEAR: NORMAL
CALCIUM SERPL-MCNC: 9 MG/DL (ref 8.8–10.2)
CHLORIDE SERPL-SCNC: 105 MMOL/L (ref 98–107)
COLOR UR AUTO: YELLOW
CREAT SERPL-MCNC: 1.93 MG/DL (ref 0.51–0.95)
CREAT UR-MCNC: 53.9 MG/DL
DACRYOCYTES BLD QL SMEAR: NORMAL
DEPRECATED HCO3 PLAS-SCNC: 25 MMOL/L (ref 22–29)
EGFRCR SERPLBLD CKD-EPI 2021: 25 ML/MIN/1.73M2
ELLIPTOCYTES BLD QL SMEAR: NORMAL
EOSINOPHIL # BLD AUTO: 0.1 10E3/UL (ref 0–0.7)
EOSINOPHIL NFR BLD AUTO: 2 %
ERYTHROCYTE [DISTWIDTH] IN BLOOD BY AUTOMATED COUNT: 17.1 % (ref 10–15)
FRAGMENTS BLD QL SMEAR: NORMAL
GLUCOSE SERPL-MCNC: 103 MG/DL (ref 70–99)
GLUCOSE UR STRIP-MCNC: NEGATIVE MG/DL
HCT VFR BLD AUTO: 30.4 % (ref 35–47)
HGB BLD-MCNC: 9.6 G/DL (ref 11.7–15.7)
HGB C CRYSTALS: NORMAL
HGB UR QL STRIP: NEGATIVE
HOWELL-JOLLY BOD BLD QL SMEAR: NORMAL
IMM GRANULOCYTES # BLD: 0 10E3/UL
IMM GRANULOCYTES NFR BLD: 0 %
KETONES UR STRIP-MCNC: NEGATIVE MG/DL
LEUKOCYTE ESTERASE UR QL STRIP: ABNORMAL
LYMPHOCYTES # BLD AUTO: 2.4 10E3/UL (ref 0.8–5.3)
LYMPHOCYTES NFR BLD AUTO: 27 %
MCH RBC QN AUTO: 30.4 PG (ref 26.5–33)
MCHC RBC AUTO-ENTMCNC: 31.6 G/DL (ref 31.5–36.5)
MCV RBC AUTO: 96 FL (ref 78–100)
MONOCYTES # BLD AUTO: 2.1 10E3/UL (ref 0–1.3)
MONOCYTES NFR BLD AUTO: 23 %
NEUTROPHILS # BLD AUTO: 4.3 10E3/UL (ref 1.6–8.3)
NEUTROPHILS NFR BLD AUTO: 47 %
NEUTS HYPERSEG BLD QL SMEAR: NORMAL
NITRATE UR QL: NEGATIVE
NRBC # BLD AUTO: 0 10E3/UL
NRBC BLD AUTO-RTO: 0 /100
PH UR STRIP: 5.5 [PH] (ref 5–7)
PHOSPHATE SERPL-MCNC: 4.3 MG/DL (ref 2.5–4.5)
PLAT MORPH BLD: NORMAL
PLATELET # BLD AUTO: 138 10E3/UL (ref 150–450)
POLYCHROMASIA BLD QL SMEAR: NORMAL
POTASSIUM SERPL-SCNC: 4.6 MMOL/L (ref 3.4–5.3)
PROT/CREAT 24H UR: 0.53 MG/MG CR (ref 0–0.2)
RBC # BLD AUTO: 3.16 10E6/UL (ref 3.8–5.2)
RBC #/AREA URNS AUTO: ABNORMAL /HPF
RBC AGGLUT BLD QL: NORMAL
RBC MORPH BLD: NORMAL
ROULEAUX BLD QL SMEAR: NORMAL
SICKLE CELLS BLD QL SMEAR: NORMAL
SMUDGE CELLS BLD QL SMEAR: NORMAL
SODIUM SERPL-SCNC: 142 MMOL/L (ref 135–145)
SP GR UR STRIP: 1.01 (ref 1–1.03)
SPHEROCYTES BLD QL SMEAR: NORMAL
SQUAMOUS #/AREA URNS AUTO: ABNORMAL /LPF
STOMATOCYTES BLD QL SMEAR: NORMAL
TARGETS BLD QL SMEAR: NORMAL
TOXIC GRANULES BLD QL SMEAR: NORMAL
UROBILINOGEN UR STRIP-ACNC: 0.2 E.U./DL
VARIANT LYMPHS BLD QL SMEAR: NORMAL
WBC # BLD AUTO: 8.9 10E3/UL (ref 4–11)
WBC #/AREA URNS AUTO: ABNORMAL /HPF

## 2024-07-08 PROCEDURE — 36415 COLL VENOUS BLD VENIPUNCTURE: CPT

## 2024-07-08 PROCEDURE — 85025 COMPLETE CBC W/AUTO DIFF WBC: CPT

## 2024-07-08 PROCEDURE — 81001 URINALYSIS AUTO W/SCOPE: CPT

## 2024-07-08 PROCEDURE — 80069 RENAL FUNCTION PANEL: CPT

## 2024-07-08 PROCEDURE — 84156 ASSAY OF PROTEIN URINE: CPT

## 2024-07-08 NOTE — TELEPHONE ENCOUNTER
Nephrology Note: RN CC Chart Review    REASON FOR ENCOUNTER:     Chart reviewed by nephrology RN CC                          SITUATION/BACKROUND:     Last nephrology visit:    Last Renal Panel:  Sodium   Date Value Ref Range Status   07/05/2024 142 135 - 145 mmol/L Final   08/26/2020 141 133 - 144 mmol/L Final     Potassium   Date Value Ref Range Status   07/05/2024 3.5 3.4 - 5.3 mmol/L Final   11/21/2022 4.8 3.4 - 5.3 mmol/L Final   08/26/2020 4.4 3.4 - 5.3 mmol/L Final     Potassium POCT   Date Value Ref Range Status   04/13/2024 4.0 3.4 - 5.3 mmol/L Final     Chloride   Date Value Ref Range Status   07/05/2024 108 (H) 98 - 107 mmol/L Final   11/21/2022 110 (H) 94 - 109 mmol/L Final   08/26/2020 111 (H) 94 - 109 mmol/L Final     Carbon Dioxide   Date Value Ref Range Status   08/26/2020 24 20 - 32 mmol/L Final     Carbon Dioxide (CO2)   Date Value Ref Range Status   07/05/2024 22 22 - 29 mmol/L Final   11/21/2022 24 20 - 32 mmol/L Final     Anion Gap   Date Value Ref Range Status   07/05/2024 12 7 - 15 mmol/L Final   11/21/2022 6 3 - 14 mmol/L Final   08/26/2020 8 3 - 14 mmol/L Final     Glucose   Date Value Ref Range Status   07/05/2024 95 70 - 99 mg/dL Final   11/21/2022 79 70 - 99 mg/dL Final   08/26/2020 84 70 - 99 mg/dL Final     GLUCOSE BY METER POCT   Date Value Ref Range Status   04/21/2024 143 (H) 70 - 99 mg/dL Final     Urea Nitrogen   Date Value Ref Range Status   07/05/2024 47.3 (H) 8.0 - 23.0 mg/dL Final   11/21/2022 35 (H) 7 - 30 mg/dL Final   08/26/2020 41 (H) 7 - 30 mg/dL Final     Creatinine   Date Value Ref Range Status   07/05/2024 1.74 (H) 0.51 - 0.95 mg/dL Final   08/26/2020 1.70 (H) 0.52 - 1.04 mg/dL Final     GFR Estimate   Date Value Ref Range Status   07/05/2024 28 (L) >60 mL/min/1.73m2 Final     Comment:     eGFR calculated using 2021 CKD-EPI equation.   08/26/2020 27 (L) >60 mL/min/[1.73_m2] Final     Comment:     Non  GFR Calc  Starting 12/18/2018, serum creatinine based  estimated GFR (eGFR) will be   calculated using the Chronic Kidney Disease Epidemiology Collaboration   (CKD-EPI) equation.       Calcium   Date Value Ref Range Status   07/05/2024 8.7 (L) 8.8 - 10.2 mg/dL Final   08/26/2020 9.0 8.5 - 10.1 mg/dL Final     Phosphorus   Date Value Ref Range Status   06/12/2024 4.1 2.5 - 4.5 mg/dL Final   08/26/2020 3.6 2.5 - 4.5 mg/dL Final     Albumin   Date Value Ref Range Status   07/03/2024 3.7 3.5 - 5.2 g/dL Final   11/21/2022 3.8 3.4 - 5.0 g/dL Final   08/26/2020 3.6 3.4 - 5.0 g/dL Final         Neph Tracking Status:  Neph Tracking Flowsheet Last Filled Values       CKD Education Status Refused    Patient's Referral Dates Auto Populate Patient's Referral Dates    Journey Referral 10/3/23              ASSESSMENT/PLAN     Follow up call in 1-2 weeks  Patient to follow up as scheduled at next appointment  Patient to call/Misocahart message with updates    Upcoming Appointments:  Future Appts Next 180 days       Visit Type Date Time Department    RETURN NEPHROLOGY 7/15/2024 11:30 AM  [facility]    RETURN NEPHROLOGY 10/28/2024  2:10 PM [facility]    RETURN NEPHROLOGY 12/30/2024  2:30 PM [facility]              CINTHIA MUNGUIA RN

## 2024-07-08 NOTE — PROGRESS NOTES
Clinic Care Coordination Contact    Situation: Patient chart reviewed by care coordinator.    Background: Referred by Inpatient RN for Care Transition related to HC discharge on 7/6/24.     Assessment: RN called pt's phone and pt's  answered (consent to communicate on file). Per pt's , pt is not available as inside the clinic getting blood work done. RN informed reason of call was to see how patient is doing post hospitalization. RN reviewed with pt's  that she has an appt tomorrow with Dr. Luciano. Pt's  agrees and informs writer that will let the patient know we called. Writer informed him that if she has any care coordination or post hospital needs to return call to clinic, which pt's  states he would share with patient. RN will not attempt another outreach for TCM call due to pt having visit with Dr. Luciano tomorrow. RN will await visit with Dr. Luciano to determine if further care coordination is needed.     Plan/Recommendations: RN will await pt's return call/visit with Dr. Luciano for further outreach. If no CC needs identified at visit tomorrow or return call from patient, RN will close program.     LUPE CALLE RN on 7/8/2024 at 2:36 PM    Per chart review, patient had visit today with Dr. Luciano and no new care coordination needs were identified. Patient did not return call with any concerns as well. RN will close program due to patient having goals met without further concern.     LUPE CALLE RN on 7/9/2024 at 3:19 PM

## 2024-07-08 NOTE — TELEPHONE ENCOUNTER
Spoke with patient to assist in scheduling a follow up with Nikki DEAN for anemia. Appointment scheduled for 7/15 virtual. Patient stated she may have complications with a virtual. Asking Mario Alberto DEAN if a phone visit is acceptable.   Lab appointment made for for 7/8 and orders placed.  CAROL Graham Care Coordinator  Nephrology

## 2024-07-08 NOTE — TELEPHONE ENCOUNTER
Called [facility] and gave verbal orders per Dr Gomez for Other: Resume and re certify care for:  Skilled Nursing- Once a week for 4 weeks and once every other week for 5 weeks  Physical Therapy- Eval and treat       Bert Quan CMA ([facility]) at 2:07 PM on 7/8/2024

## 2024-07-08 NOTE — TELEPHONE ENCOUNTER
[facility] Call Center    Phone Message    May a detailed message be left on voicemail: yes     Reason for Call: Other: Resume and re certify care for:  Skilled Nursing- Once a week for 4 weeks and once every other week for 5 weeks  Physical Therapy- Eval and treat     Action Taken: Other: PCC    Travel Screening: Not Applicable     Date of Service:

## 2024-07-09 ENCOUNTER — MEDICAL CORRESPONDENCE (OUTPATIENT)
Dept: HEALTH INFORMATION MANAGEMENT | Facility: CLINIC | Age: 89
End: 2024-07-09

## 2024-07-09 ENCOUNTER — MYC MEDICAL ADVICE (OUTPATIENT)
Dept: GASTROENTEROLOGY | Facility: CLINIC | Age: 89
End: 2024-07-09

## 2024-07-09 ENCOUNTER — TELEPHONE (OUTPATIENT)
Dept: FAMILY MEDICINE | Facility: CLINIC | Age: 89
End: 2024-07-09

## 2024-07-09 ENCOUNTER — OFFICE VISIT (OUTPATIENT)
Dept: INTERNAL MEDICINE | Facility: CLINIC | Age: 89
End: 2024-07-09
Payer: COMMERCIAL

## 2024-07-09 VITALS
SYSTOLIC BLOOD PRESSURE: 120 MMHG | HEART RATE: 78 BPM | TEMPERATURE: 97.8 F | OXYGEN SATURATION: 99 % | BODY MASS INDEX: 24.67 KG/M2 | DIASTOLIC BLOOD PRESSURE: 70 MMHG | WEIGHT: 143.7 LBS

## 2024-07-09 DIAGNOSIS — N28.9 DECREASED RENAL FUNCTION: ICD-10-CM

## 2024-07-09 DIAGNOSIS — D64.9 ANEMIA, UNSPECIFIED TYPE: Primary | ICD-10-CM

## 2024-07-09 PROCEDURE — 99213 OFFICE O/P EST LOW 20 MIN: CPT | Performed by: INTERNAL MEDICINE

## 2024-07-09 NOTE — PROGRESS NOTES
HPI  89-year-old returns today for hospital follow-up.  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  This is complicated by her history of atrial fibrillation and anticoagulation.  She is apparently back on Eliquis now she is off the iron she is passing brown stool no blood or bleeding in the stool.  She is feeling well pep and energy is good no chest pain or other complaints.  Past Medical History:   Diagnosis Date    CAD (coronary artery disease)     CAD s/p PCI+BMS to MRCA 10/2002, PCI to mLCX 2/2003, PCI+DESx2 to pLAD 11/2007, PCI+DESx1 to dRCA 12/2007, PCI+ALVAREZ to RPDA 7/2013; on indefinite DAPT  10/27/2002    10/27/2002: AMI s/p PCI+BMS (3.5x18mm Bx velocity) to mRCA 2/5/2003: Back pain; PCI+stent to mLCX c/b distal embolization/slow flow 4/11/2003: Unstable angina; PCI+stent (Hepacoat velocity) to mLAD 11/27/2007: PCI+DESx2 to pLAD (IVUS w/ calcification of LMCA and ulcerated plaque pLAD, 80% dRCA; also had 80% dLCX, too small to stent) 12/11/2007: Staged PCI. PCI+DESx1 (3.5x13mm Cypher) to dRCA (indefinite DAPT recommended at this time) 6/27/2008: Angina. mLCX stent 70% ISR. LAD and RCA stents patent. Myocardium at risk from LCX felt to be small, medical management preferred to PCI. 11/10/2009: Angina. Findings unchanged from 6/27/2008, FFR LAD 0.90. Medical management recommended. 7/23/2013: Unstable angina; PCI+ALVAREZ to mPDA. Diagnostic findings: LMCA 40% distal. LAD: pLAD stents patent mLAD 30% ISR dLAD 50% diffuse, D2 diffuse disease. LCX 80% mid ISR. RCA diffuse <30% mid, RPLAs diffuse disease, RPDA 100% occlusion.      Cardiac Pacemaker- Medtronic, dual chamber- NOT dependant 11/28/2007    CKD (chronic kidney disease), stage IV (H)     Hyperlipidemia LDL goal <70     Hypertension     Stented coronary artery 10-    RCA    Stented coronary artery 2-5-2003    LCx    Stented coronary artery 4-    LAD    Stented coronary artery 11-    LAD    Stented  coronary artery 12-    RCA    Transient complete heart block (H) 11/28/2007     Past Surgical History:   Procedure Laterality Date    CHOLECYSTECTOMY      CV CORONARY ANGIOGRAM N/A 6/17/2022    Procedure: Coronary Angiogram;  Surgeon: Constantine Macias MD;  Location:  [facility]    CV CORONARY ANGIOGRAM N/A 7/17/2023    Procedure: Coronary Angiogram;  Surgeon: Constantine Macias MD;  Location:  [facility]    CV PCI N/A 6/17/2022    Procedure: Percutaneous Coronary Intervention;  Surgeon: Constantine Macias MD;  Location:  [facility]    CV PCI N/A 7/17/2023    Procedure: Percutaneous Coronary Intervention;  Surgeon: Constantine Macias MD;  Location:  HE[facility] C[facility]    CV PCI N/A 11/6/2023    Procedure: Percutaneous Coronary Intervention;  Surgeon: Constantine Macias MD;  Location:  [facility] CATH [facility]    CV RIGHT HEART CATH MEASUREMENTS RECORDED N/A 6/17/2022    Procedure: Right Heart Catheterization;  Surgeon: Constantine Macias MD;  Location:  HEART [facility]ARD[facility] CATH LAB    EP PACEMAKER N/A 10/15/2019    Procedure: EP PACEMAKER;  Surgeon: Anthony Zhu MD;  Location:  HEART CARD[facility] CATH LAB    ESOPHAGOSCOPY, GASTROSCOPY, DUODENOSCOPY (EGD), COMBINED N/A 7/20/2023    Procedure: Esophagoscopy, gastroscopy, duodenoscopy (EGD), combined;  Surgeon: Bekah Dash DO;  Location:  GI    ESOPHAGOSCOPY, GASTROSCOPY, DUODENOSCOPY (EGD), COMBINED N/A 5/2/2024    Procedure: Esophagoscopy, gastroscopy, duodenoscopy (EGD), combined;  Surgeon: Tavo Donaldson MD;  Location:  GI    HC PPM INSERTION NEW/REPLACEMENT W/ ATRIAL&VENTRICULAR LEAD  11-    HYSTERECTOMY      LAPAROTOMY EXPLORATORY N/A 4/13/2024    Procedure: Laparotomy exploratory, Wound Vac Placement.;  Surgeon: Anthony Trammell MD;  Location: [facility]    LAPAROTOMY EXPLORATORY N/A 4/14/2024    Procedure: Abdominal Re-Exploration and Closure;  Surgeon:  Anthony Trammell MD;  Location: [facility]    LAPAROTOMY EXPLORATORY N/A 4/13/2024    Procedure: Exploratory Laparotomy;  Surgeon: Anthony Trammell MD;  Location: [facility]     Family History   Problem Relation Age of Onset    Cancer Sister 82        bladder cancer         Exam:  /70 (BP Location: Right arm, Patient Position: Sitting, Cuff Size: Adult Regular)   Pulse 78   Temp 97.8  F (36.6  C) (Oral)   Wt 65.2 kg (143 lb 11.2 oz)   SpO2 99%   BMI 24.67 kg/m    143 lbs 11.2 oz  The patient is alert, oriented with a clear sensorium.   Skin shows no lesions or rashes and good turgor.   Head is normocephalic and atraumatic.    Neck shows no nodes, thyromegaly.     Lungs are clear.   Heart shows normal S1 and S2 without murmur or gallop.    Extremities show bilateral trasce edema.    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      Ludwin Luciano MD  General Internal Medicine  [facility]  [phone]

## 2024-07-09 NOTE — TELEPHONE ENCOUNTER
MTM referral from: Transitions of Care (recent hospital discharge or ED visit)    MT referral outreach attempt #2 on July 9, 2024 at 11:21 AM      Outcome: Patient is not interested at this time because if she has any concerns, she goes to her current pharmacy    Use [employer] [facility] for the carrier/Plan on the flowsheet          See Scout  [facility]   [phone]

## 2024-07-09 NOTE — TELEPHONE ENCOUNTER
"Spoke with patient to inform her that a phone visit was ok'd by Mario Alberto DEAN. Patient stated a \"thank you\".  CAROL Graham Care Coordinator  Nephrology    "

## 2024-07-10 ENCOUNTER — TELEPHONE (OUTPATIENT)
Dept: CARDIOLOGY | Facility: CLINIC | Age: 89
End: 2024-07-10
Payer: COMMERCIAL

## 2024-07-10 NOTE — TELEPHONE ENCOUNTER
[facility] Call Center    Phone Message    May a detailed message be left on voicemail: yes     Reason for Call: Other: Preeti from [facility] is requesting the sing order for potasium chloride and furosemide be faxed back asa.  She stated she faxed on 6/6/24 and is refaxing them today.    Fax  [phone]  Action Taken: Other: cardio    Travel Screening: Not Applicable    Thank you!  [facility]       Date of Service:

## 2024-07-11 NOTE — TELEPHONE ENCOUNTER
Spoke to [facility] supervisor, [place]. At this time they no longer need the prescriptions faxed over. Added [facility] to Care Team. Closing encounter.

## 2024-07-12 NOTE — PROGRESS NOTES
Nephrology Progress Note   7/15/2024    Virtual Visit Details    Type of service:  Telephone Visit   Phone call duration: 7 minutes   Originating Location (pt. Location): Home    Distant Location (provider location):  On-site    Assessment and Plan:   89 year old female with history of long standing HTN, CAD s/p multiple stents, who presents for followup of CKD stage 4, baseline SCr 1.8-2.2 mg/dL now with proteinuria. She had another angiogram with stent done June 2022 and November 2023 (she now has 10 stents)    1. CKD stage 4- baseline SCr 1.8-2.2mg/ dL, eGFR 25-29 ml/min. Mild/ minimal proteinuria now up to > 1gram, due to ? Uncontrolled BP, now better at 0.58g/g cr  - recent urine albumin/cr 2443 mg/g  Her swelling was previously occasional. She's had more swelling since taking amlodipine 10 mg daily and had been taking furosemide 40 mg BID. Now only taking 40mg once a day and swelling is OK , takes 20mg in PM if needed.  - creatinine stable at 1.9  - Has proteinuria - may be due to BP not ideally controlled. Consider SGLT2 inhibitor?  Unclear benefit at her age.  - BP 130s/70s at home. Was 120/70 in clinic  - monitor labs, relatively stable at this time  - consider ACE inhibitor, cannot see that she has been on this, given proteinuria and CKD-   - will check some blood pressure at home    2. Electrolytes/Acid Base status- normal.    Hypokalemia- takes potassium 20 mEq pills daily  - K 4.6, Na 142   Metabolic acidosis- bicarbonate was lower at 17, now up to 25  - continue sodium bicarb 650 mg BID, consider lowering to once a day if > 27 on next check    3. Hypertension/Volume status-  She is low normal in the office, will have her check BP at home.   She is on amlodipine 10 mg daily, isosorbide mononitrate 60 mg TID, metoprolol 50 mg BID, furosemide 40 mg daily, an extra 20 mg in pm as needed  - no longer on hydralazine, metoprolol dose decreased  - continue checking BP at home  - sees cardiology on July 18 for  consideration of Watchman procedure   -patient reports history of reaction/intolerance to Spironolactone, Chlorthalidone, Clonidine and Terazosin in the past   - takes prn furosemide in the afternoon as needed, and BP is close to goal.   - had new stent to RCA placed on 7/17/23 and another in November 2023  - ECHO 7/17/23  Left ventricular function is normal.The ejection fraction is 55-60%. There is  mild hypokinesis of the basal and mid inferior segments.  The right ventricle is normal size. Global right ventricular function is  normal.  No significant valvular abnormalities.  The estimated PA systolic pressure is 32 mmHg.  IVC diameter <2.1 cm collapsing >50% with sniff suggests a normal RA pressure  of 3 mmHg.    4. Anemia- hgb at 8.3, up from 7.5 on 7/5/24.  seen in ED on 7/5, Hgb 6.9 was transfused. iron sat 9%, then up to 24% after transfusion  - she was seen by hematology and GI. Recommended to stop oral iron and start IV iron due to potential for GI bleeding and difficulty in knowing if black stools are from iron or bleeding.   - low platelet count- 106, improved from 76  - check CBC regularly  - continue follow up with anemia clinic  - hoping to have Watchman procedure so she cons discontinue eliquis which is likely contributing to GI bleed    5. BMD  - last serum calcium and phosphorus were normal  -secondary hyperparathyroidism-PTH was 84  - vit D is high normal at 60- repeat yearly.     Assessment and plan was discussed with patient and she voiced her understanding and agreement.    - return as planned with Dr. Martin in October.       Reason for Visit:  Tessa Mansfield is an 89 year old female with HTN, who presents for CKD management.     HPI:  She is a pleasant female with PMMHx significant for stage III CKD presumed secondary to hypertensive nephrosclerosis.Her renal function has been  relatively stable, ranging from 1.8-2.2 mg/dL over the years. She has history of CAD s/p multiple stents  (8  stents in all) since 2004 and a pacemaker in 2007. She follows with cardiologist Dr Santoyo and Dr Zhu.    She had elected stent placed in RCA on 7/17/23 to help with angina.     She was admitted from 7/19 to 7/23/23 for melena secondary to bleeding colonic angiodysplastic lesion, acute on chronic anemia. S/p repair.     She has been bleeding again and was admitted from 7/5-7/6/24 with acute on chronic anemia and had another transfusion. She has been referred to anemia clinic.     She has been taking furosemide once a day for a while, takes 20 mg in the afternoon as needed.    She feels much better since last blood transfusion. She is eating a little better. Per patient she has lost ~ 20 lbs since March.   She denies any further diarrhea no n/v.     She had another angiogram in November 2023, she was having chest pain (back pain ) with exertion which resolved after stent.  Her son was ?diagnosed with Albina Gehrig but second opinion at [facility] thinks it is more a spine / nerve impingement?        She will see cardiology on 7/18 and hopefully have watchman procedure.      Home BP: 130s/    Baseline Cr: 1.8-2.2    ROS:  A comprehensive review of systems was obtained and negative, except as noted in the HPI or PMH.    Active Medical Problems:  Patient Active Problem List   Diagnosis    Degeneration of cervical intervertebral disc    Nonallopathic lesion of cervical region    Nonallopathic lesion of thoracic region    Coronary artery disease of native artery of native heart with stable angina pectoris (H24)    Dizziness and giddiness    Edema    Esophageal reflux    Gout    Essential hypertension    Obstructive sleep apnea    Osteomalacia    Post-menopausal osteoporosis    Cardiac Pacemaker- Medtronic, dual chamber- NOT dependant    Transient complete heart block (H)    Sinus node dysfunction (H)    Disturbance of skin sensation    NSTEMI (non-ST elevated myocardial infarction) (H)    Arrhythmia    Sick sinus syndrome  (H)    Non-rheumatic mitral regurgitation    Non-rheumatic tricuspid valve insufficiency    Anemia of chronic renal failure, stage 4 (severe) (H)    Status post coronary angiogram    Secondary renal hyperparathyroidism (H24)    Thrombocytopenia (H24)    Renal hypertension    Acute on chronic diastolic (congestive) heart failure (H)    Chest pain, unspecified type    Long term (current) use of anticoagulants    Melena    General weakness    S/P coronary artery stent placement    Antiplatelet or antithrombotic long-term use    Anemia, unspecified type    Aftercare following surgery of the musculoskeletal system    Closed right femoral fracture (H)    Hyperlipidemia    Hypokalemia    Orthostatic hypotension    Osteoarthritis of right patellofemoral joint    Postoperative anemia due to acute blood loss    Chronic anemia    ACP (advance care planning)    Chronic kidney disease, stage 3 (H)    Biomechanical lesion, unspecified    GERD (gastroesophageal reflux disease)    Weakness    Acute cystitis with hematuria    Leukocytosis, unspecified type    Small bowel obstruction (H)    UGIB (upper gastrointestinal bleed)    Paroxysmal atrial fibrillation (H)    Stage 3a chronic kidney disease (H)    Symptomatic anemia    Gastrointestinal hemorrhage, unspecified gastrointestinal hemorrhage type       Personal Hx:   Social History     Socioeconomic History    Marital status:      Spouse name: Not on file    Number of children: 4    Years of education: Not on file    Highest education level: Not on file   Occupational History     Employer: [employer]   Tobacco Use    Smoking status: Former     Current packs/day: 0.30     Average packs/day: 0.3 packs/day for 15.0 years (4.5 ttl pk-yrs)     Types: Cigarettes    Smokeless tobacco: Never    Tobacco comments:     Quit 35+ years ago   Vaping Use    Vaping status: Never Used   Substance and Sexual Activity    Alcohol use: Not on file    Drug use: No    Sexual activity:  Not Currently     Partners: Male     Birth control/protection: Post-menopausal   Other Topics Concern     Service Not Asked    Blood Transfusions Yes    Caffeine Concern Not Asked    Occupational Exposure Not Asked    Hobby Hazards Not Asked    Sleep Concern Not Asked    Stress Concern Not Asked    Weight Concern Not Asked    Special Diet Not Asked    Back Care Not Asked    Exercise No    Bike Helmet Not Asked    Seat Belt Not Asked    Self-Exams Not Asked    Parent/sibling w/ CABG, MI or angioplasty before 65F 55M? Not Asked   Social History Narrative    Not on file     Social Determinants of Health     Financial Resource Strain: Low Risk  (11/15/2023)    Financial Resource Strain     Within the past 12 months, have you or your family members you live with been unable to get utilities (heat, electricity) when it was really needed?: No   Food Insecurity: Low Risk  (11/15/2023)    Food Insecurity     Within the past 12 months, did you worry that your food would run out before you got money to buy more?: No     Within the past 12 months, did the food you bought just not last and you didn t have money to get more?: No   Transportation Needs: Low Risk  (11/15/2023)    Transportation Needs     Within the past 12 months, has lack of transportation kept you from medical appointments, getting your medicines, non-medical meetings or appointments, work, or from getting things that you need?: No   Physical Activity: Not on file   Stress: Not on file   Social Connections: Not on file   Interpersonal Safety: Low Risk  (11/15/2023)    Interpersonal Safety     Do you feel physically and emotionally safe where you currently live?: Yes     Within the past 12 months, have you been hit, slapped, kicked or otherwise physically hurt by someone?: No     Within the past 12 months, have you been humiliated or emotionally abused in other ways by your partner or ex-partner?: No   Housing Stability: Low Risk  (11/15/2023)    Housing  "Stability     Do you have housing? : Yes     Are you worried about losing your housing?: No       Allergies:  Allergies   Allergen Reactions    Codeine Sulfate Itching    Shrimp Swelling    Oxycodone Other (See Comments)    Shrimp Extract Swelling    Bactrim [Sulfamethoxazole W/Trimethoprim]      Patient unable to recall    Biaxin [Clarithromycin]     Chlorthalidone Nausea and Vomiting    Clonidine     Darvon [Propoxyphene Hcl]     Dilaudid [Hydromorphone] Visual Disturbance and Hallucination     Tolerated in April 2024 hospital admissions    Gabapentin Fatigue and Confusion     \"felt drunk\"    Indomethacin     Levaquin [Levofloxacin Hemihydrate]     Morphine Sulfate     Percocet [Oxycodone-Acetaminophen] Hallucination    Pregabalin Itching and Fatigue    Simvastatin Palpitations     Muscle weakness, leg cramping      Spironolactone      Dehydrated      Terazosin        Current Outpatient Medications   Medication Sig Dispense Refill    acetaminophen (TYLENOL) 325 MG tablet Take 975 mg by mouth nightly as needed for pain      allopurinol (ZYLOPRIM) 100 MG tablet Take 1 tablet (100 mg) by mouth daily 90 tablet 0    amLODIPine (NORVASC) 10 MG tablet Take 1 tablet (10 mg) by mouth daily 90 tablet 3    apixaban ANTICOAGULANT (ELIQUIS) 2.5 MG tablet Take 1 tablet (2.5 mg) by mouth 2 times daily 180 tablet 3    clopidogrel (PLAVIX) 75 MG tablet Take 1 tablet (75 mg) by mouth daily 90 tablet 1    fluticasone-salmeterol (ADVAIR) 250-50 MCG/ACT inhaler INHALE 1 DOSE BY MOUTH TWICE DAILY 60 each 8    furosemide (LASIX) 40 MG tablet Take 1 tablet (40 mg) by mouth daily May take an additional 20 mg at noon as needed for swelling. 90 tablet 2    isosorbide mononitrate (ISMO/MONOKET) 20 MG tablet Take 3 tablets (60 mg) by mouth 2 times daily 180 tablet 3    methocarbamol (ROBAXIN) 500 MG tablet Take 1 tablet (500 mg) by mouth every 6 hours as needed for muscle spasms 30 tablet 0    metoprolol tartrate (LOPRESSOR) 25 MG tablet Take " 2 tablets (50 mg) by mouth 2 times daily 180 tablet 3    Multiple Vitamins-Minerals (PRESERVISION AREDS 2+MULTI VIT PO) Take 1 capsule by mouth 2 times daily      omeprazole (PRILOSEC) 40 MG DR capsule Take 1 capsule (40 mg) by mouth daily 90 capsule 3    polyethylene glycol (GOLYTELY) 236 g suspension Two days before procedure at 5PM fill first container with water. Mix and drink an 8 oz glass every 10-15 minutes until HALF of the container is gone. Place the remainder in the refrigerator. One day before procedure at 5PM drink second half of bowel prep. Drink an 8 oz glass every 10-15 minutes until it is gone. Day of procedure 6 hours before arrival time fill the 2nd container with water. Mix and drink an 8 oz glass every 10-15 minutes until HALF of the container is gone. Discard the remaining solution. (Patient not taking: Reported on 7/9/2024) 8000 mL 0    potassium chloride ER (K-TAB) 20 MEQ CR tablet Take 1 tablet (20 mEq) by mouth daily 90 tablet 3    rosuvastatin (CRESTOR) 20 MG tablet Take 1 tablet (20 mg) by mouth daily for 360 days 90 tablet 3    sodium bicarbonate 650 MG tablet Take 1 tablet (650 mg) by mouth 2 times daily 60 tablet 11    timolol maleate (TIMOPTIC) 0.5 % ophthalmic solution INSTILL 1 DROP INTO EACH EYE ONCE DAILY IN THE MORNING      vitamin B-12 (CYANOCOBALAMIN) 500 MCG tablet Take 1 tablet by mouth daily      vitamin D3 25 mcg (1000 units) tablet Take 1 tablet (25 mcg) by mouth every other day 90 tablet 3     No current facility-administered medications for this visit.       Vitals:  There were no vitals taken for this visit.    Exam:  none    Results:  Last Comprehensive Metabolic Panel:  Sodium   Date Value Ref Range Status   07/08/2024 142 135 - 145 mmol/L Final   08/26/2020 141 133 - 144 mmol/L Final     Potassium   Date Value Ref Range Status   07/08/2024 4.6 3.4 - 5.3 mmol/L Final   11/21/2022 4.8 3.4 - 5.3 mmol/L Final   08/26/2020 4.4 3.4 - 5.3 mmol/L Final     Potassium POCT    Date Value Ref Range Status   04/13/2024 4.0 3.4 - 5.3 mmol/L Final     Chloride   Date Value Ref Range Status   07/08/2024 105 98 - 107 mmol/L Final   11/21/2022 110 (H) 94 - 109 mmol/L Final   08/26/2020 111 (H) 94 - 109 mmol/L Final     Carbon Dioxide   Date Value Ref Range Status   08/26/2020 24 20 - 32 mmol/L Final     Carbon Dioxide (CO2)   Date Value Ref Range Status   07/08/2024 25 22 - 29 mmol/L Final   11/21/2022 24 20 - 32 mmol/L Final     Anion Gap   Date Value Ref Range Status   07/08/2024 12 7 - 15 mmol/L Final   11/21/2022 6 3 - 14 mmol/L Final   08/26/2020 8 3 - 14 mmol/L Final     Glucose   Date Value Ref Range Status   07/08/2024 103 (H) 70 - 99 mg/dL Final   11/21/2022 79 70 - 99 mg/dL Final   08/26/2020 84 70 - 99 mg/dL Final     GLUCOSE BY METER POCT   Date Value Ref Range Status   04/21/2024 143 (H) 70 - 99 mg/dL Final     Urea Nitrogen   Date Value Ref Range Status   07/08/2024 56.5 (H) 8.0 - 23.0 mg/dL Final   11/21/2022 35 (H) 7 - 30 mg/dL Final   08/26/2020 41 (H) 7 - 30 mg/dL Final     Creatinine   Date Value Ref Range Status   07/08/2024 1.93 (H) 0.51 - 0.95 mg/dL Final   08/26/2020 1.70 (H) 0.52 - 1.04 mg/dL Final     GFR Estimate   Date Value Ref Range Status   07/08/2024 25 (L) >60 mL/min/1.73m2 Final     Comment:     eGFR calculated using 2021 CKD-EPI equation.   08/26/2020 27 (L) >60 mL/min/[1.73_m2] Final     Comment:     Non  GFR Calc  Starting 12/18/2018, serum creatinine based estimated GFR (eGFR) will be   calculated using the Chronic Kidney Disease Epidemiology Collaboration   (CKD-EPI) equation.       Calcium   Date Value Ref Range Status   07/08/2024 9.0 8.8 - 10.2 mg/dL Final   08/26/2020 9.0 8.5 - 10.1 mg/dL Final       Last Basic Metabolic Panel:  Lab Results   Component Value Date     11/08/2017      Lab Results   Component Value Date    POTASSIUM 3.5 11/08/2017     Lab Results   Component Value Date    CHLORIDE 104 11/08/2017     Lab Results    Component Value Date    ALEXANDRO 8.8 11/08/2017     Lab Results   Component Value Date    CO2 26 11/08/2017     Lab Results   Component Value Date    BUN 54 11/08/2017     Lab Results   Component Value Date    CR 2.36 11/08/2017     Lab Results   Component Value Date    GLC 88 11/08/2017       Laura Llanes PA-C    Visit length 7 minutes. An additional 20 minutes were spent on date of service in chart review, documentation, and other acitivies as noted.

## 2024-07-15 ENCOUNTER — VIRTUAL VISIT (OUTPATIENT)
Dept: NEPHROLOGY | Facility: CLINIC | Age: 89
End: 2024-07-15
Attending: PHYSICIAN ASSISTANT
Payer: COMMERCIAL

## 2024-07-15 ENCOUNTER — LAB (OUTPATIENT)
Dept: LAB | Facility: CLINIC | Age: 89
End: 2024-07-15
Payer: COMMERCIAL

## 2024-07-15 DIAGNOSIS — E87.6 HYPOKALEMIA: ICD-10-CM

## 2024-07-15 DIAGNOSIS — N28.9 DECREASED RENAL FUNCTION: ICD-10-CM

## 2024-07-15 DIAGNOSIS — D64.9 CHRONIC ANEMIA: ICD-10-CM

## 2024-07-15 DIAGNOSIS — E87.20 METABOLIC ACIDOSIS: ICD-10-CM

## 2024-07-15 DIAGNOSIS — N25.81 SECONDARY RENAL HYPERPARATHYROIDISM (H): ICD-10-CM

## 2024-07-15 DIAGNOSIS — N18.4 CHRONIC KIDNEY DISEASE, STAGE IV (SEVERE) (H): Primary | ICD-10-CM

## 2024-07-15 DIAGNOSIS — I10 ESSENTIAL HYPERTENSION: ICD-10-CM

## 2024-07-15 DIAGNOSIS — D64.9 ANEMIA, UNSPECIFIED TYPE: ICD-10-CM

## 2024-07-15 LAB
ACANTHOCYTES BLD QL SMEAR: NORMAL
AUER BODIES BLD QL SMEAR: NORMAL
BASO STIPL BLD QL SMEAR: NORMAL
BASOPHILS # BLD AUTO: 0.1 10E3/UL (ref 0–0.2)
BASOPHILS NFR BLD AUTO: 1 %
BITE CELLS BLD QL SMEAR: NORMAL
BLISTER CELLS BLD QL SMEAR: NORMAL
BURR CELLS BLD QL SMEAR: NORMAL
DACRYOCYTES BLD QL SMEAR: NORMAL
ELLIPTOCYTES BLD QL SMEAR: NORMAL
EOSINOPHIL # BLD AUTO: 0.1 10E3/UL (ref 0–0.7)
EOSINOPHIL NFR BLD AUTO: 2 %
ERYTHROCYTE [DISTWIDTH] IN BLOOD BY AUTOMATED COUNT: 15.6 % (ref 10–15)
FRAGMENTS BLD QL SMEAR: NORMAL
HCT VFR BLD AUTO: 31 % (ref 35–47)
HGB BLD-MCNC: 9.8 G/DL (ref 11.7–15.7)
HGB C CRYSTALS: NORMAL
HOWELL-JOLLY BOD BLD QL SMEAR: NORMAL
IMM GRANULOCYTES # BLD: 0 10E3/UL
IMM GRANULOCYTES NFR BLD: 0 %
LYMPHOCYTES # BLD AUTO: 2.3 10E3/UL (ref 0.8–5.3)
LYMPHOCYTES NFR BLD AUTO: 37 %
MCH RBC QN AUTO: 30.1 PG (ref 26.5–33)
MCHC RBC AUTO-ENTMCNC: 31.6 G/DL (ref 31.5–36.5)
MCV RBC AUTO: 95 FL (ref 78–100)
MONOCYTES # BLD AUTO: 1.6 10E3/UL (ref 0–1.3)
MONOCYTES NFR BLD AUTO: 26 %
NEUTROPHILS # BLD AUTO: 2.1 10E3/UL (ref 1.6–8.3)
NEUTROPHILS NFR BLD AUTO: 34 %
NEUTS HYPERSEG BLD QL SMEAR: NORMAL
NRBC # BLD AUTO: 0 10E3/UL
NRBC BLD AUTO-RTO: 0 /100
PLAT MORPH BLD: NORMAL
PLATELET # BLD AUTO: 103 10E3/UL (ref 150–450)
POLYCHROMASIA BLD QL SMEAR: NORMAL
RBC # BLD AUTO: 3.26 10E6/UL (ref 3.8–5.2)
RBC AGGLUT BLD QL: NORMAL
RBC MORPH BLD: NORMAL
ROULEAUX BLD QL SMEAR: NORMAL
SICKLE CELLS BLD QL SMEAR: NORMAL
SMUDGE CELLS BLD QL SMEAR: NORMAL
SPHEROCYTES BLD QL SMEAR: NORMAL
STOMATOCYTES BLD QL SMEAR: NORMAL
TARGETS BLD QL SMEAR: NORMAL
TOXIC GRANULES BLD QL SMEAR: NORMAL
VARIANT LYMPHS BLD QL SMEAR: NORMAL
WBC # BLD AUTO: 6.2 10E3/UL (ref 4–11)

## 2024-07-15 PROCEDURE — 80048 BASIC METABOLIC PNL TOTAL CA: CPT

## 2024-07-15 PROCEDURE — 99443 PR PHYSICIAN TELEPHONE EVALUATION 21-30 MIN: CPT | Mod: 93 | Performed by: PHYSICIAN ASSISTANT

## 2024-07-15 PROCEDURE — 85025 COMPLETE CBC W/AUTO DIFF WBC: CPT

## 2024-07-15 PROCEDURE — 36415 COLL VENOUS BLD VENIPUNCTURE: CPT

## 2024-07-15 NOTE — PATIENT INSTRUCTIONS
No changes today.   Continue to hydrate with water, follow low sodium diet.   Avoid ibuprofen/aleve.   Check blood pressure daily and keep log.   Labs and follow up in October as planned.

## 2024-07-15 NOTE — NURSING NOTE
Current patient location: [address] [place] [id number]    Is the patient currently in the state of [place]? YES    Visit mode:TELEPHONE    If the visit is dropped, the patient can be reconnected by: TELEPHONE VISIT: Phone number:   Telephone Information:   Mobile [phone]       Will anyone else be joining the visit? NO  (If patient encounters technical issues they should call [phone])    How would you like to obtain your AVS? MyChart    Are changes needed to the allergy or medication list? No    Are refills needed on medications prescribed by this physician? NO    Reason for visit: RECHECK    Steven IBRAHIM

## 2024-07-15 NOTE — LETTER
7/15/2024       RE: Tessa Mansfield  [address]  [facility] [address]     Dear Colleague,    Thank you for referring your patient, Tessa Mansfield, to the [facility] [place] [facility]. Please see a copy of my visit note below.    Nephrology Progress Note   7/15/2024    Virtual Visit Details    Type of service:  Telephone Visit   Phone call duration: 7 minutes   Originating Location (pt. Location): Home    Distant Location (provider location):  On-site    Assessment and Plan:   89 year old female with history of long standing HTN, CAD s/p multiple stents, who presents for followup of CKD stage 4, baseline SCr 1.8-2.2 mg/dL now with proteinuria. She had another angiogram with stent done June 2022 and November 2023 (she now has 10 stents)    1. CKD stage 4- baseline SCr 1.8-2.2mg/ dL, eGFR 25-29 ml/min. Mild/ minimal proteinuria now up to > 1gram, due to ? Uncontrolled BP, now better at 0.58g/g cr  - recent urine albumin/cr 2443 mg/g  Her swelling was previously occasional. She's had more swelling since taking amlodipine 10 mg daily and had been taking furosemide 40 mg BID. Now only taking 40mg once a day and swelling is OK , takes 20mg in PM if needed.  - creatinine stable at 1.9  - Has proteinuria - may be due to BP not ideally controlled. Consider SGLT2 inhibitor?  Unclear benefit at her age.  - BP 130s/70s at home. Was 120/70 in clinic  - monitor labs, relatively stable at this time  - consider ACE inhibitor, cannot see that she has been on this, given proteinuria and CKD-   - will check some blood pressure at home    2. Electrolytes/Acid Base status- normal.    Hypokalemia- takes potassium 20 mEq pills daily  - K 4.6, Na 142   Metabolic acidosis- bicarbonate was lower at 17, now up to 25  - continue sodium bicarb 650 mg BID, consider lowering to once a day if > 27 on next check    3. Hypertension/Volume status-  She is low  normal in the office, will have her check BP at home.   She is on amlodipine 10 mg daily, isosorbide mononitrate 60 mg TID, metoprolol 50 mg BID, furosemide 40 mg daily, an extra 20 mg in pm as needed  - no longer on hydralazine, metoprolol dose decreased  - continue checking BP at home  - sees cardiology on July 18 for consideration of Watchman procedure   -patient reports history of reaction/intolerance to Spironolactone, Chlorthalidone, Clonidine and Terazosin in the past   - takes prn furosemide in the afternoon as needed, and BP is close to goal.   - had new stent to RCA placed on 7/17/23 and another in November 2023  - ECHO 7/17/23  Left ventricular function is normal.The ejection fraction is 55-60%. There is  mild hypokinesis of the basal and mid inferior segments.  The right ventricle is normal size. Global right ventricular function is  normal.  No significant valvular abnormalities.  The estimated PA systolic pressure is 32 mmHg.  IVC diameter <2.1 cm collapsing >50% with sniff suggests a normal RA pressure  of 3 mmHg.    4. Anemia- hgb at 8.3, up from 7.5 on 7/5/24.  seen in ED on 7/5, Hgb 6.9 was transfused. iron sat 9%, then up to 24% after transfusion  - she was seen by hematology and GI. Recommended to stop oral iron and start IV iron due to potential for GI bleeding and difficulty in knowing if black stools are from iron or bleeding.   - low platelet count- 106, improved from 76  - check CBC regularly  - continue follow up with anemia clinic  - hoping to have Watchman procedure so she cons discontinue eliquis which is likely contributing to GI bleed    5. BMD  - last serum calcium and phosphorus were normal  -secondary hyperparathyroidism-PTH was 84  - vit D is high normal at 60- repeat yearly.     Assessment and plan was discussed with patient and she voiced her understanding and agreement.    - return as planned with Dr. Martin in October.       Reason for Visit:  Tessa RAHEEM Mansfield is an 89 year  old female with HTN, who presents for CKD management.     HPI:  She is a pleasant female with PMMHx significant for stage III CKD presumed secondary to hypertensive nephrosclerosis.Her renal function has been  relatively stable, ranging from 1.8-2.2 mg/dL over the years. She has history of CAD s/p multiple stents  (8 stents in all) since 2004 and a pacemaker in 2007. She follows with cardiologist Dr Santoyo and Dr Zhu.    She had elected stent placed in RCA on 7/17/23 to help with angina.     She was admitted from 7/19 to 7/23/23 for melena secondary to bleeding colonic angiodysplastic lesion, acute on chronic anemia. S/p repair.     She has been bleeding again and was admitted from 7/5-7/6/24 with acute on chronic anemia and had another transfusion. She has been referred to anemia clinic.     She has been taking furosemide once a day for a while, takes 20 mg in the afternoon as needed.    She feels much better since last blood transfusion. She is eating a little better. Per patient she has lost ~ 20 lbs since March.   She denies any further diarrhea no n/v.     She had another angiogram in November 2023, she was having chest pain (back pain ) with exertion which resolved after stent.  Her son was ?diagnosed with Albina Gehrig but second opinion at [place] thinks it is more a spine / nerve impingement?        She will see cardiology on 7/18 and hopefully have watchman procedure.      Home BP: 130s/    Baseline Cr: 1.8-2.2    ROS:  A comprehensive review of systems was obtained and negative, except as noted in the HPI or PMH.    Active Medical Problems:  Patient Active Problem List   Diagnosis     Degeneration of cervical intervertebral disc     Nonallopathic lesion of cervical region     Nonallopathic lesion of thoracic region     Coronary artery disease of native artery of native heart with stable angina pectoris (H24)     Dizziness and giddiness     Edema     Esophageal reflux     Gout     Essential hypertension      Obstructive sleep apnea     Osteomalacia     Post-menopausal osteoporosis     Cardiac Pacemaker- Medtronic, dual chamber- NOT dependant     Transient complete heart block (H)     Sinus node dysfunction (H)     Disturbance of skin sensation     NSTEMI (non-ST elevated myocardial infarction) (H)     Arrhythmia     Sick sinus syndrome (H)     Non-rheumatic mitral regurgitation     Non-rheumatic tricuspid valve insufficiency     Anemia of chronic renal failure, stage 4 (severe) (H)     Status post coronary angiogram     Secondary renal hyperparathyroidism (H24)     Thrombocytopenia (H24)     Renal hypertension     Acute on chronic diastolic (congestive) heart failure (H)     Chest pain, unspecified type     Long term (current) use of anticoagulants     Melena     General weakness     S/P coronary artery stent placement     Antiplatelet or antithrombotic long-term use     Anemia, unspecified type     Aftercare following surgery of the musculoskeletal system     Closed right femoral fracture (H)     Hyperlipidemia     Hypokalemia     Orthostatic hypotension     Osteoarthritis of right patellofemoral joint     Postoperative anemia due to acute blood loss     Chronic anemia     ACP (advance care planning)     Chronic kidney disease, stage 3 (H)     Biomechanical lesion, unspecified     GERD (gastroesophageal reflux disease)     Weakness     Acute cystitis with hematuria     Leukocytosis, unspecified type     Small bowel obstruction (H)     UGIB (upper gastrointestinal bleed)     Paroxysmal atrial fibrillation (H)     Stage 3a chronic kidney disease (H)     Symptomatic anemia     Gastrointestinal hemorrhage, unspecified gastrointestinal hemorrhage type       Personal Hx:   Social History     Socioeconomic History     Marital status:      Spouse name: Not on file     Number of children: 4     Years of education: Not on file     Highest education level: Not on file   Occupational History     Employer: [employer]   Tobacco Use     Smoking status: Former     Current packs/day: 0.30     Average packs/day: 0.3 packs/day for 15.0 years (4.5 ttl pk-yrs)     Types: Cigarettes     Smokeless tobacco: Never     Tobacco comments:     Quit 35+ years ago   Vaping Use     Vaping status: Never Used   Substance and Sexual Activity     Alcohol use: Not on file     Drug use: No     Sexual activity: Not Currently     Partners: Male     Birth control/protection: Post-menopausal   Other Topics Concern      Service Not Asked     Blood Transfusions Yes     Caffeine Concern Not Asked     Occupational Exposure Not Asked     Hobby Hazards Not Asked     Sleep Concern Not Asked     Stress Concern Not Asked     Weight Concern Not Asked     Special Diet Not Asked     Back Care Not Asked     Exercise No     Bike Helmet Not Asked     Seat Belt Not Asked     Self-Exams Not Asked     Parent/sibling w/ CABG, MI or angioplasty before 65F 55M? Not Asked   Social History Narrative     Not on file     Social Determinants of Health     Financial Resource Strain: Low Risk  (11/15/2023)    Financial Resource Strain      Within the past 12 months, have you or your family members you live with been unable to get utilities (heat, electricity) when it was really needed?: No   Food Insecurity: Low Risk  (11/15/2023)    Food Insecurity      Within the past 12 months, did you worry that your food would run out before you got money to buy more?: No      Within the past 12 months, did the food you bought just not last and you didn t have money to get more?: No   Transportation Needs: Low Risk  (11/15/2023)    Transportation Needs      Within the past 12 months, has lack of transportation kept you from medical appointments, getting your medicines, non-medical meetings or appointments, work, or from getting things that you need?: No   Physical Activity: Not on file   Stress: Not on file   Social Connections: Not on file   Interpersonal Safety: Low Risk   "(11/15/2023)    Interpersonal Safety      Do you feel physically and emotionally safe where you currently live?: Yes      Within the past 12 months, have you been hit, slapped, kicked or otherwise physically hurt by someone?: No      Within the past 12 months, have you been humiliated or emotionally abused in other ways by your partner or ex-partner?: No   Housing Stability: Low Risk  (11/15/2023)    Housing Stability      Do you have housing? : Yes      Are you worried about losing your housing?: No       Allergies:  Allergies   Allergen Reactions     Codeine Sulfate Itching     Shrimp Swelling     Oxycodone Other (See Comments)     Shrimp Extract Swelling     Bactrim [Sulfamethoxazole W/Trimethoprim]      Patient unable to recall     Biaxin [Clarithromycin]      Chlorthalidone Nausea and Vomiting     Clonidine      Darvon [Propoxyphene Hcl]      Dilaudid [Hydromorphone] Visual Disturbance and Hallucination     Tolerated in April 2024 hospital admissions     Gabapentin Fatigue and Confusion     \"felt drunk\"     Indomethacin      Levaquin [Levofloxacin Hemihydrate]      Morphine Sulfate      Percocet [Oxycodone-Acetaminophen] Hallucination     Pregabalin Itching and Fatigue     Simvastatin Palpitations     Muscle weakness, leg cramping       Spironolactone      Dehydrated       Terazosin        Current Outpatient Medications   Medication Sig Dispense Refill     acetaminophen (TYLENOL) 325 MG tablet Take 975 mg by mouth nightly as needed for pain       allopurinol (ZYLOPRIM) 100 MG tablet Take 1 tablet (100 mg) by mouth daily 90 tablet 0     amLODIPine (NORVASC) 10 MG tablet Take 1 tablet (10 mg) by mouth daily 90 tablet 3     apixaban ANTICOAGULANT (ELIQUIS) 2.5 MG tablet Take 1 tablet (2.5 mg) by mouth 2 times daily 180 tablet 3     clopidogrel (PLAVIX) 75 MG tablet Take 1 tablet (75 mg) by mouth daily 90 tablet 1     fluticasone-salmeterol (ADVAIR) 250-50 MCG/ACT inhaler INHALE 1 DOSE BY MOUTH TWICE DAILY 60 each " 8     furosemide (LASIX) 40 MG tablet Take 1 tablet (40 mg) by mouth daily May take an additional 20 mg at noon as needed for swelling. 90 tablet 2     isosorbide mononitrate (ISMO/MONOKET) 20 MG tablet Take 3 tablets (60 mg) by mouth 2 times daily 180 tablet 3     methocarbamol (ROBAXIN) 500 MG tablet Take 1 tablet (500 mg) by mouth every 6 hours as needed for muscle spasms 30 tablet 0     metoprolol tartrate (LOPRESSOR) 25 MG tablet Take 2 tablets (50 mg) by mouth 2 times daily 180 tablet 3     Multiple Vitamins-Minerals (PRESERVISION AREDS 2+MULTI VIT PO) Take 1 capsule by mouth 2 times daily       omeprazole (PRILOSEC) 40 MG DR capsule Take 1 capsule (40 mg) by mouth daily 90 capsule 3     polyethylene glycol (GOLYTELY) 236 g suspension Two days before procedure at 5PM fill first container with water. Mix and drink an 8 oz glass every 10-15 minutes until HALF of the container is gone. Place the remainder in the refrigerator. One day before procedure at 5PM drink second half of bowel prep. Drink an 8 oz glass every 10-15 minutes until it is gone. Day of procedure 6 hours before arrival time fill the 2nd container with water. Mix and drink an 8 oz glass every 10-15 minutes until HALF of the container is gone. Discard the remaining solution. (Patient not taking: Reported on 7/9/2024) 8000 mL 0     potassium chloride ER (K-TAB) 20 MEQ CR tablet Take 1 tablet (20 mEq) by mouth daily 90 tablet 3     rosuvastatin (CRESTOR) 20 MG tablet Take 1 tablet (20 mg) by mouth daily for 360 days 90 tablet 3     sodium bicarbonate 650 MG tablet Take 1 tablet (650 mg) by mouth 2 times daily 60 tablet 11     timolol maleate (TIMOPTIC) 0.5 % ophthalmic solution INSTILL 1 DROP INTO EACH EYE ONCE DAILY IN THE MORNING       vitamin B-12 (CYANOCOBALAMIN) 500 MCG tablet Take 1 tablet by mouth daily       vitamin D3 25 mcg (1000 units) tablet Take 1 tablet (25 mcg) by mouth every other day 90 tablet 3     No current facility-administered  medications for this visit.       Vitals:  There were no vitals taken for this visit.    Exam:  none    Results:  Last Comprehensive Metabolic Panel:  Sodium   Date Value Ref Range Status   07/08/2024 142 135 - 145 mmol/L Final   08/26/2020 141 133 - 144 mmol/L Final     Potassium   Date Value Ref Range Status   07/08/2024 4.6 3.4 - 5.3 mmol/L Final   11/21/2022 4.8 3.4 - 5.3 mmol/L Final   08/26/2020 4.4 3.4 - 5.3 mmol/L Final     Potassium POCT   Date Value Ref Range Status   04/13/2024 4.0 3.4 - 5.3 mmol/L Final     Chloride   Date Value Ref Range Status   07/08/2024 105 98 - 107 mmol/L Final   11/21/2022 110 (H) 94 - 109 mmol/L Final   08/26/2020 111 (H) 94 - 109 mmol/L Final     Carbon Dioxide   Date Value Ref Range Status   08/26/2020 24 20 - 32 mmol/L Final     Carbon Dioxide (CO2)   Date Value Ref Range Status   07/08/2024 25 22 - 29 mmol/L Final   11/21/2022 24 20 - 32 mmol/L Final     Anion Gap   Date Value Ref Range Status   07/08/2024 12 7 - 15 mmol/L Final   11/21/2022 6 3 - 14 mmol/L Final   08/26/2020 8 3 - 14 mmol/L Final     Glucose   Date Value Ref Range Status   07/08/2024 103 (H) 70 - 99 mg/dL Final   11/21/2022 79 70 - 99 mg/dL Final   08/26/2020 84 70 - 99 mg/dL Final     GLUCOSE BY METER POCT   Date Value Ref Range Status   04/21/2024 143 (H) 70 - 99 mg/dL Final     Urea Nitrogen   Date Value Ref Range Status   07/08/2024 56.5 (H) 8.0 - 23.0 mg/dL Final   11/21/2022 35 (H) 7 - 30 mg/dL Final   08/26/2020 41 (H) 7 - 30 mg/dL Final     Creatinine   Date Value Ref Range Status   07/08/2024 1.93 (H) 0.51 - 0.95 mg/dL Final   08/26/2020 1.70 (H) 0.52 - 1.04 mg/dL Final     GFR Estimate   Date Value Ref Range Status   07/08/2024 25 (L) >60 mL/min/1.73m2 Final     Comment:     eGFR calculated using 2021 CKD-EPI equation.   08/26/2020 27 (L) >60 mL/min/[1.73_m2] Final     Comment:     Non  GFR Calc  Starting 12/18/2018, serum creatinine based estimated GFR (eGFR) will be   calculated  using the Chronic Kidney Disease Epidemiology Collaboration   (CKD-EPI) equation.       Calcium   Date Value Ref Range Status   07/08/2024 9.0 8.8 - 10.2 mg/dL Final   08/26/2020 9.0 8.5 - 10.1 mg/dL Final       Last Basic Metabolic Panel:  Lab Results   Component Value Date     11/08/2017      Lab Results   Component Value Date    POTASSIUM 3.5 11/08/2017     Lab Results   Component Value Date    CHLORIDE 104 11/08/2017     Lab Results   Component Value Date    ALEXANDRO 8.8 11/08/2017     Lab Results   Component Value Date    CO2 26 11/08/2017     Lab Results   Component Value Date    BUN 54 11/08/2017     Lab Results   Component Value Date    CR 2.36 11/08/2017     Lab Results   Component Value Date    GLC 88 11/08/2017       Dean Llanes PA-C    Visit length 7 minutes. An additional 20 minutes were spent on date of service in chart review, documentation, and other acitivies as noted.       Again, thank you for allowing me to participate in the care of your patient.      Sincerely,    DEAN MAYER PA-C

## 2024-07-16 ENCOUNTER — OFFICE VISIT (OUTPATIENT)
Dept: FAMILY MEDICINE | Facility: CLINIC | Age: 89
End: 2024-07-16
Payer: COMMERCIAL

## 2024-07-16 VITALS
BODY MASS INDEX: 24.92 KG/M2 | TEMPERATURE: 97.4 F | SYSTOLIC BLOOD PRESSURE: 135 MMHG | OXYGEN SATURATION: 99 % | DIASTOLIC BLOOD PRESSURE: 70 MMHG | WEIGHT: 145.2 LBS | HEART RATE: 75 BPM

## 2024-07-16 DIAGNOSIS — D64.9 ANEMIA, UNSPECIFIED TYPE: Primary | ICD-10-CM

## 2024-07-16 DIAGNOSIS — K92.2 GASTROINTESTINAL HEMORRHAGE, UNSPECIFIED GASTROINTESTINAL HEMORRHAGE TYPE: ICD-10-CM

## 2024-07-16 DIAGNOSIS — N18.30 STAGE 3 CHRONIC KIDNEY DISEASE, UNSPECIFIED WHETHER STAGE 3A OR 3B CKD (H): ICD-10-CM

## 2024-07-16 LAB
ANION GAP SERPL CALCULATED.3IONS-SCNC: 12 MMOL/L (ref 7–15)
BUN SERPL-MCNC: 42.4 MG/DL (ref 8–23)
CALCIUM SERPL-MCNC: 9.2 MG/DL (ref 8.8–10.4)
CHLORIDE SERPL-SCNC: 105 MMOL/L (ref 98–107)
CREAT SERPL-MCNC: 1.75 MG/DL (ref 0.51–0.95)
EGFRCR SERPLBLD CKD-EPI 2021: 28 ML/MIN/1.73M2
GLUCOSE SERPL-MCNC: 80 MG/DL (ref 70–99)
HCO3 SERPL-SCNC: 24 MMOL/L (ref 22–29)
POTASSIUM SERPL-SCNC: 4.4 MMOL/L (ref 3.4–5.3)
SODIUM SERPL-SCNC: 141 MMOL/L (ref 135–145)

## 2024-07-16 PROCEDURE — 99213 OFFICE O/P EST LOW 20 MIN: CPT | Performed by: FAMILY MEDICINE

## 2024-07-16 PROCEDURE — G2211 COMPLEX E/M VISIT ADD ON: HCPCS | Performed by: FAMILY MEDICINE

## 2024-07-16 NOTE — PROGRESS NOTES
Assessment & Plan     Anemia, unspecified type  Redo labs later this month  - CBC with platelets and differential; Future  - Basic metabolic panel  (Ca, Cl, CO2, Creat, Gluc, K, Na, BUN); Future    Stage 3 chronic kidney disease, unspecified whether stage 3a or 3b CKD (H)  Same  - Basic metabolic panel  (Ca, Cl, CO2, Creat, Gluc, K, Na, BUN); Future    Gastrointestinal hemorrhage, unspecified gastrointestinal hemorrhage type  Call if symptoms, review w/ cardiology if Watchman candidate to get off Eliquis        The longitudinal plan of care for the diagnosis(es)/condition(s) as documented were addressed during this visit. Due to the added complexity in care, I will continue to support Tessa in the subsequent management and with ongoing continuity of care.    25 minutes spent by me on the date of the encounter doing chart review, history and exam, documentation and further activities per the note  No follow-ups on file.    Lashell Zarate is a 89 year old, presenting for the following health issues:  Follow Up (Pt here to follow up; would like to review lab work)        7/16/2024     8:34 AM   Additional Questions   Roomed by Yaa MCGUIRE     History of Present Illness       Reason for visit:  Follow up    She eats 2-3 servings of fruits and vegetables daily.She consumes 2 sweetened beverage(s) daily.She exercises with enough effort to increase her heart rate 10 to 19 minutes per day.  She exercises with enough effort to increase her heart rate 3 or less days per week.   She is taking medications regularly.     Pt inpt recently for GI bleed  Saw PCC MD since then  Follow-up today w/   No interval complaints  Eats well, no  or GI complaints, no bleeding  No new or interval symptos  Sees cardiology soon to zeina Watchman in hopes get off Eliquis    Yesterday cbc hgb up a bit more; bmp not back yet    Just saw Renal MD yesterday notes reviewed    Past Medical History:   Diagnosis Date    CAD (coronary  artery disease)     CAD s/p PCI+BMS to MRCA 10/2002, PCI to mLCX 2/2003, PCI+DESx2 to pLAD 11/2007, PCI+DESx1 to dRCA 12/2007, PCI+ALVAREZ to RPDA 7/2013; on indefinite DAPT  10/27/2002    10/27/2002: AMI s/p PCI+BMS (3.5x18mm Bx velocity) to mRCA 2/5/2003: Back pain; PCI+stent to mLCX c/b distal embolization/slow flow 4/11/2003: Unstable angina; PCI+stent (Hepacoat velocity) to mLAD 11/27/2007: PCI+DESx2 to pLAD (IVUS w/ calcification of LMCA and ulcerated plaque pLAD, 80% dRCA; also had 80% dLCX, too small to stent) 12/11/2007: Staged PCI. PCI+DESx1 (3.5x13mm Cypher) to dRCA (indefinite DAPT recommended at this time) 6/27/2008: Angina. mLCX stent 70% ISR. LAD and RCA stents patent. Myocardium at risk from LCX felt to be small, medical management preferred to PCI. 11/10/2009: Angina. Findings unchanged from 6/27/2008, FFR LAD 0.90. Medical management recommended. 7/23/2013: Unstable angina; PCI+ALVAREZ to mPDA. Diagnostic findings: LMCA 40% distal. LAD: pLAD stents patent mLAD 30% ISR dLAD 50% diffuse, D2 diffuse disease. LCX 80% mid ISR. RCA diffuse <30% mid, RPLAs diffuse disease, RPDA 100% occlusion.      Cardiac Pacemaker- Medtronic, dual chamber- NOT dependant 11/28/2007    CKD (chronic kidney disease), stage IV (H)     Hyperlipidemia LDL goal <70     Hypertension     Stented coronary artery 10-    RCA    Stented coronary artery 2-5-2003    LCx    Stented coronary artery 4-    LAD    Stented coronary artery 11-    LAD    Stented coronary artery 12-    RCA    Transient complete heart block (H) 11/28/2007     Past Surgical History:   Procedure Laterality Date    CHOLECYSTECTOMY      CV CORONARY ANGIOGRAM N/A 6/17/2022    Procedure: Coronary Angiogram;  Surgeon: Constantine Macias MD;  [facility]    CV CORONARY ANGIOGRAM N/A 7/17/2023    Procedure: Coronary Angiogram;  Surgeon: Constantine Macias MD;  Location:  [facility]    CV PCI N/A 6/17/2022     Procedure: Percutaneous Coronary Intervention;  Surgeon: Constantine Macias MD;  Location:  [facility]    CV PCI N/A 7/17/2023    Procedure: Percutaneous Coronary Intervention;  Surgeon: Constantine Macias MD;  Location:  [facility]    CV PCI N/A 11/6/2023    Procedure: Percutaneous Coronary Intervention;  Surgeon: Constantine Macias MD;  Location:  [facility]    CV RIGHT HEART CATH MEASUREMENTS RECORDED N/A 6/17/2022    Procedure: Right Heart Catheterization;  Surgeon: Constantine Macias MD;  Location:  [facility]    EP PACEMAKER N/A 10/15/2019    Procedure: EP PACEMAKER;  Surgeon: Anthony Zhu MD;  Location:  [facility]    ESOPHAGOSCOPY, GASTROSCOPY, DUODENOSCOPY (EGD), COMBINED N/A 7/20/2023    Procedure: Esophagoscopy, gastroscopy, duodenoscopy (EGD), combined;  Surgeon: Bekah Dash DO;  Location:  GI    ESOPHAGOSCOPY, GASTROSCOPY, DUODENOSCOPY (EGD), COMBINED N/A 5/2/2024    Procedure: Esophagoscopy, gastroscopy, duodenoscopy (EGD), combined;  Surgeon: Tavo Donaldson MD;  Location:  GI    HC PPM INSERTION NEW/REPLACEMENT W/ ATRIAL&VENTRICULAR LEAD  11-    HYSTERECTOMY      LAPAROTOMY EXPLORATORY N/A 4/13/2024    Procedure: Laparotomy exploratory, Wound Vac Placement.;  Surgeon: Anthony Trammell MD;  Location: [facility]    LAPAROTOMY EXPLORATORY N/A 4/14/2024    Procedure: Abdominal Re-Exploration and Closure;  Surgeon: Anthony Trammell MD;  Location: [facility]    LAPAROTOMY EXPLORATORY N/A 4/13/2024    Procedure: Exploratory Laparotomy;  Surgeon: Anthony Trammell MD;  Location: [facility]     Current Outpatient Medications   Medication Sig Dispense Refill    acetaminophen (TYLENOL) 325 MG tablet Take 975 mg by mouth nightly as needed for pain      allopurinol (ZYLOPRIM) 100 MG tablet Take 1 tablet (100 mg) by mouth daily 90 tablet 0    amLODIPine (NORVASC) 10 MG tablet Take 1 tablet (10  mg) by mouth daily 90 tablet 3    apixaban ANTICOAGULANT (ELIQUIS) 2.5 MG tablet Take 1 tablet (2.5 mg) by mouth 2 times daily 180 tablet 3    clopidogrel (PLAVIX) 75 MG tablet Take 1 tablet (75 mg) by mouth daily 90 tablet 1    fluticasone-salmeterol (ADVAIR) 250-50 MCG/ACT inhaler INHALE 1 DOSE BY MOUTH TWICE DAILY 60 each 8    furosemide (LASIX) 40 MG tablet Take 1 tablet (40 mg) by mouth daily May take an additional 20 mg at noon as needed for swelling. 90 tablet 2    isosorbide mononitrate (ISMO/MONOKET) 20 MG tablet Take 3 tablets (60 mg) by mouth 2 times daily 180 tablet 3    methocarbamol (ROBAXIN) 500 MG tablet Take 1 tablet (500 mg) by mouth every 6 hours as needed for muscle spasms 30 tablet 0    metoprolol tartrate (LOPRESSOR) 25 MG tablet Take 2 tablets (50 mg) by mouth 2 times daily 180 tablet 3    Multiple Vitamins-Minerals (PRESERVISION AREDS 2+MULTI VIT PO) Take 1 capsule by mouth 2 times daily      omeprazole (PRILOSEC) 40 MG DR capsule Take 1 capsule (40 mg) by mouth daily 90 capsule 3    polyethylene glycol (GOLYTELY) 236 g suspension Two days before procedure at 5PM fill first container with water. Mix and drink an 8 oz glass every 10-15 minutes until HALF of the container is gone. Place the remainder in the refrigerator. One day before procedure at 5PM drink second half of bowel prep. Drink an 8 oz glass every 10-15 minutes until it is gone. Day of procedure 6 hours before arrival time fill the 2nd container with water. Mix and drink an 8 oz glass every 10-15 minutes until HALF of the container is gone. Discard the remaining solution. (Patient not taking: Reported on 7/9/2024) 8000 mL 0    potassium chloride ER (K-TAB) 20 MEQ CR tablet Take 1 tablet (20 mEq) by mouth daily 90 tablet 3    rosuvastatin (CRESTOR) 20 MG tablet Take 1 tablet (20 mg) by mouth daily for 360 days 90 tablet 3    sodium bicarbonate 650 MG tablet Take 1 tablet (650 mg) by mouth 2 times daily 60 tablet 11    timolol  "maleate (TIMOPTIC) 0.5 % ophthalmic solution INSTILL 1 DROP INTO EACH EYE ONCE DAILY IN THE MORNING      vitamin B-12 (CYANOCOBALAMIN) 500 MCG tablet Take 1 tablet by mouth daily      vitamin D3 25 mcg (1000 units) tablet Take 1 tablet (25 mcg) by mouth every other day 90 tablet 3     No current facility-administered medications for this visit.     Allergies   Allergen Reactions    Codeine Sulfate Itching    Shrimp Swelling    Oxycodone Other (See Comments)    Shrimp Extract Swelling    Bactrim [Sulfamethoxazole W/Trimethoprim]      Patient unable to recall    Biaxin [Clarithromycin]     Chlorthalidone Nausea and Vomiting    Clonidine     Darvon [Propoxyphene Hcl]     Dilaudid [Hydromorphone] Visual Disturbance and Hallucination     Tolerated in April 2024 hospital admissions    Gabapentin Fatigue and Confusion     \"felt drunk\"    Indomethacin     Levaquin [Levofloxacin Hemihydrate]     Morphine Sulfate     Percocet [Oxycodone-Acetaminophen] Hallucination    Pregabalin Itching and Fatigue    Simvastatin Palpitations     Muscle weakness, leg cramping      Spironolactone      Dehydrated      Terazosin      Family History   Problem Relation Age of Onset    Cancer Sister 82        bladder cancer     Social History     Socioeconomic History    Marital status:      Spouse name: Not on file    Number of children: 4    Years of education: Not on file    Highest education level: Not on file   Occupational History     Employer: [employer]   Tobacco Use    Smoking status: Former     Current packs/day: 0.30     Average packs/day: 0.3 packs/day for 15.0 years (4.5 ttl pk-yrs)     Types: Cigarettes    Smokeless tobacco: Never    Tobacco comments:     Quit 35+ years ago   Vaping Use    Vaping status: Never Used   Substance and Sexual Activity    Alcohol use: Not on file    Drug use: No    Sexual activity: Not Currently     Partners: Male     Birth control/protection: Post-menopausal   Other Topics Concern    "  Service Not Asked    Blood Transfusions Yes    Caffeine Concern Not Asked    Occupational Exposure Not Asked    Hobby Hazards Not Asked    Sleep Concern Not Asked    Stress Concern Not Asked    Weight Concern Not Asked    Special Diet Not Asked    Back Care Not Asked    Exercise No    Bike Helmet Not Asked    Seat Belt Not Asked    Self-Exams Not Asked    Parent/sibling w/ CABG, MI or angioplasty before 65F 55M? Not Asked   Social History Narrative    Not on file     Social Determinants of Health     Financial Resource Strain: Low Risk  (11/15/2023)    Financial Resource Strain     Within the past 12 months, have you or your family members you live with been unable to get utilities (heat, electricity) when it was really needed?: No   Food Insecurity: Low Risk  (11/15/2023)    Food Insecurity     Within the past 12 months, did you worry that your food would run out before you got money to buy more?: No     Within the past 12 months, did the food you bought just not last and you didn t have money to get more?: No   Transportation Needs: Low Risk  (11/15/2023)    Transportation Needs     Within the past 12 months, has lack of transportation kept you from medical appointments, getting your medicines, non-medical meetings or appointments, work, or from getting things that you need?: No   Physical Activity: Not on file   Stress: Not on file   Social Connections: Not on file   Interpersonal Safety: Low Risk  (11/15/2023)    Interpersonal Safety     Do you feel physically and emotionally safe where you currently live?: Yes     Within the past 12 months, have you been hit, slapped, kicked or otherwise physically hurt by someone?: No     Within the past 12 months, have you been humiliated or emotionally abused in other ways by your partner or ex-partner?: No   Housing Stability: Low Risk  (11/15/2023)    Housing Stability     Do you have housing? : Yes     Are you worried about losing your housing?: No        Objective    /70 (BP Location: Right arm, Patient Position: Sitting, Cuff Size: Adult Regular)   Pulse 75   Temp 97.4  F (36.3  C) (Oral)   Wt 65.9 kg (145 lb 3.2 oz)   SpO2 99%   BMI 24.92 kg/m    Body mass index is 24.92 kg/m .  Physical Exam   GENERAL: alert and no distress  MS: no gross musculoskeletal defects noted, no edema            Signed Electronically by: Pedro Gomez MD

## 2024-07-18 ENCOUNTER — TELEPHONE (OUTPATIENT)
Dept: FAMILY MEDICINE | Facility: CLINIC | Age: 89
End: 2024-07-18

## 2024-07-18 ENCOUNTER — DOCUMENTATION ONLY (OUTPATIENT)
Dept: FAMILY MEDICINE | Facility: CLINIC | Age: 89
End: 2024-07-18
Payer: COMMERCIAL

## 2024-07-18 ENCOUNTER — OFFICE VISIT (OUTPATIENT)
Dept: CARDIOLOGY | Facility: CLINIC | Age: 89
End: 2024-07-18
Attending: STUDENT IN AN ORGANIZED HEALTH CARE EDUCATION/TRAINING PROGRAM
Payer: COMMERCIAL

## 2024-07-18 VITALS
HEART RATE: 80 BPM | WEIGHT: 145.7 LBS | SYSTOLIC BLOOD PRESSURE: 156 MMHG | BODY MASS INDEX: 25.01 KG/M2 | OXYGEN SATURATION: 97 % | DIASTOLIC BLOOD PRESSURE: 80 MMHG

## 2024-07-18 DIAGNOSIS — D64.9 SYMPTOMATIC ANEMIA: ICD-10-CM

## 2024-07-18 DIAGNOSIS — K92.2 GASTROINTESTINAL HEMORRHAGE, UNSPECIFIED GASTROINTESTINAL HEMORRHAGE TYPE: ICD-10-CM

## 2024-07-18 DIAGNOSIS — I48.20 CHRONIC ATRIAL FIBRILLATION (H): Primary | ICD-10-CM

## 2024-07-18 PROCEDURE — 99215 OFFICE O/P EST HI 40 MIN: CPT | Performed by: INTERNAL MEDICINE

## 2024-07-18 PROCEDURE — G0463 HOSPITAL OUTPT CLINIC VISIT: HCPCS | Performed by: INTERNAL MEDICINE

## 2024-07-18 ASSESSMENT — PAIN SCALES - GENERAL: PAINLEVEL: NO PAIN (0)

## 2024-07-18 NOTE — TELEPHONE ENCOUNTER
[facility] Call Center    Phone Message    May a detailed message be left on voicemail: yes     Reason for Call: Order(s): Home Care Orders: Physical Therapy (PT): PT orders: every other wk for 8wks, thank you    Action Taken: Message routed to:  [facility] ([facility]):      Travel Screening: Not Applicable     Date of Service:

## 2024-07-18 NOTE — NURSING NOTE
Chief Complaint   Patient presents with    New Patient     Watchman Workup     Vitals were taken and medications reconciled.    Nikolai Stevens, EMT  12:52 PM

## 2024-07-18 NOTE — PROGRESS NOTES
[facility] HEART [facility]  CARDIOVASCULAR DIVISION    INITIAL CONSULTATION        PERTINENT CLINICAL HISTORY:     Tessa Mansfield is a very pleasant 89 year old female with atrial fibrillation referred to our clinic for evaluation and consideration of left atrial appendage closure.      Their remaining medical history is also notable for recent hospitalization for blood loss anemia, CAD s/p multiple stents most recent in November 2023, sick sinus syndrome s/p dual-chamber pacemaker, paroxysmal A-fib (on Eliquis), hyperlipidemia, hypertension, CKD stage IV.     Over the last few months she has had several hospital admissions for concern referred acute on chronic GI bleeds.    Admitted 5/1 to 5/7 for melena. EGD performed at that time demonstrating possibly an AVM oozing in setting of anticoagulation. Additionally may have possible AVM of right colon vs small bowel. On 7/3 patient presented to the emergency department with concern of fatgue. Reports possible melena (taking oral iron). Hgb upon arrival 6.9. Transfusion initiated. 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      PAST MEDICAL HISTORY:     Past Medical History:   Diagnosis Date    CAD (coronary artery disease)     CAD s/p PCI+BMS to MRCA 10/2002, PCI to mLCX 2/2003, PCI+DESx2 to pLAD 11/2007, PCI+DESx1 to dRCA 12/2007, PCI+ALVAREZ to RPDA 7/2013; on indefinite DAPT  10/27/2002    10/27/2002: AMI s/p PCI+BMS (3.5x18mm Bx velocity) to mRCA 2/5/2003: Back pain; PCI+stent to mLCX c/b distal embolization/slow flow 4/11/2003: Unstable angina; PCI+stent (Hepacoat velocity) to mLAD 11/27/2007: PCI+DESx2 to pLAD (IVUS w/ calcification of LMCA and ulcerated plaque pLAD, 80% dRCA; also had 80% dLCX, too small to stent) 12/11/2007: Staged PCI. PCI+DESx1 (3.5x13mm Cypher) to dRCA (indefinite DAPT recommended at this time) 6/27/2008: Angina. mLCX stent 70%  ISR. LAD and RCA stents patent. Myocardium at risk from LCX felt to be small, medical management preferred to PCI. 11/10/2009: Angina. Findings unchanged from 6/27/2008, FFR LAD 0.90. Medical management recommended. 7/23/2013: Unstable angina; PCI+ALVAREZ to mPDA. Diagnostic findings: LMCA 40% distal. LAD: pLAD stents patent mLAD 30% ISR dLAD 50% diffuse, D2 diffuse disease. LCX 80% mid ISR. RCA diffuse <30% mid, RPLAs diffuse disease, RPDA 100% occlusion.      Cardiac Pacemaker- [employer], dual chamber- NOT dependant 11/28/2007    CKD (chronic kidney disease), stage IV (H)     Hyperlipidemia LDL goal <70     Hypertension     Stented coronary artery 10-    RCA    Stented coronary artery 2-5-2003    LCx    Stented coronary artery 4-    LAD    Stented coronary artery 11-    LAD    Stented coronary artery 12-    RCA    Transient complete heart block (H) 11/28/2007        PAST SURGICAL HISTORY:     Past Surgical History:   Procedure Laterality Date    CHOLECYSTECTOMY      CV CORONARY ANGIOGRAM N/A 6/17/2022    Procedure: Coronary Angiogram;  Surgeon: Constantine Macias MD;  Location: [facility] CARDIAC CATH LAB    CV CORONARY ANGIOGRAM N/A 7/17/2023    Procedure: Coronary Angiogram;  Surgeon: Constantine Macias MD;  Location:  HEART CARDIAC CATH LAB    CV PCI N/A 6/17/2022    Procedure: Percutaneous Coronary Intervention;  Surgeon: Constantine Macias MD;  Location:  HEART CARDIAC CATH LAB    CV PCI N/A 7/17/2023    Procedure: Percutaneous Coronary Intervention;  Surgeon: Constantine Macias MD;  Location:  HEART CARDIAC CATH LAB    CV PCI N/A 11/6/2023    Procedure: Percutaneous Coronary Intervention;  Surgeon: Constantine Macias MD;  Location:  HEART CARDIAC CATH LAB    CV RIGHT HEART CATH MEASUREMENTS RECORDED N/A 6/17/2022    Procedure: Right Heart Catheterization;  Surgeon: Constantine Macias MD;  Location:  HEART CARDIAC CATH LAB    EP PACEMAKER N/A 10/15/2019     Procedure: EP PACEMAKER;  Surgeon: Anthony Zhu MD;  Location: [facility] HEART CARDIAC CATH LAB    ESOPHAGOSCOPY, GASTROSCOPY, DUODENOSCOPY (EGD), COMBINED N/A 7/20/2023    Procedure: Esophagoscopy, gastroscopy, duodenoscopy (EGD), combined;  Surgeon: Bekah Dash DO;  Location: [facility] GI    ESOPHAGOSCOPY, GASTROSCOPY, DUODENOSCOPY (EGD), COMBINED N/A 5/2/2024    Procedure: Esophagoscopy, gastroscopy, duodenoscopy (EGD), combined;  Surgeon: Tavo Donaldson MD;  Location: [facility] GI    HC PPM INSERTION NEW/REPLACEMENT W/ ATRIAL&VENTRICULAR LEAD  11-    HYSTERECTOMY      LAPAROTOMY EXPLORATORY N/A 4/13/2024    Procedure: Laparotomy exploratory, Wound Vac Placement.;  Surgeon: Anthony Trammell MD;  Location: [facility] OR    LAPAROTOMY EXPLORATORY N/A 4/14/2024    Procedure: Abdominal Re-Exploration and Closure;  Surgeon: Anthony Trammell MD;  Location: [facility] OR    LAPAROTOMY EXPLORATORY N/A 4/13/2024    Procedure: Exploratory Laparotomy;  Surgeon: Anthony Trammell MD;  Location: [facility] OR        CURRENT MEDICATIONS:     Current Outpatient Medications   Medication Sig Dispense Refill    acetaminophen (TYLENOL) 325 MG tablet Take 975 mg by mouth nightly as needed for pain      allopurinol (ZYLOPRIM) 100 MG tablet Take 1 tablet (100 mg) by mouth daily 90 tablet 0    amLODIPine (NORVASC) 10 MG tablet Take 1 tablet (10 mg) by mouth daily 90 tablet 3    apixaban ANTICOAGULANT (ELIQUIS) 2.5 MG tablet Take 1 tablet (2.5 mg) by mouth 2 times daily 180 tablet 3    clopidogrel (PLAVIX) 75 MG tablet Take 1 tablet (75 mg) by mouth daily 90 tablet 1    fluticasone-salmeterol (ADVAIR) 250-50 MCG/ACT inhaler INHALE 1 DOSE BY MOUTH TWICE DAILY 60 each 8    furosemide (LASIX) 40 MG tablet Take 1 tablet (40 mg) by mouth daily May take an additional 20 mg at noon as needed for swelling. 90 tablet 2    isosorbide mononitrate (ISMO/MONOKET) 20 MG tablet Take 3 tablets (60 mg) by mouth 2 times daily 180 tablet 3     methocarbamol (ROBAXIN) 500 MG tablet Take 1 tablet (500 mg) by mouth every 6 hours as needed for muscle spasms 30 tablet 0    metoprolol tartrate (LOPRESSOR) 25 MG tablet Take 2 tablets (50 mg) by mouth 2 times daily 180 tablet 3    Multiple Vitamins-Minerals (PRESERVISION AREDS 2+MULTI VIT PO) Take 1 capsule by mouth 2 times daily      omeprazole (PRILOSEC) 40 MG DR capsule Take 1 capsule (40 mg) by mouth daily 90 capsule 3    potassium chloride ER (K-TAB) 20 MEQ CR tablet Take 1 tablet (20 mEq) by mouth daily 90 tablet 3    sodium bicarbonate 650 MG tablet Take 1 tablet (650 mg) by mouth 2 times daily 60 tablet 11    timolol maleate (TIMOPTIC) 0.5 % ophthalmic solution INSTILL 1 DROP INTO EACH EYE ONCE DAILY IN THE MORNING      vitamin B-12 (CYANOCOBALAMIN) 500 MCG tablet Take 1 tablet by mouth daily      vitamin D3 25 mcg (1000 units) tablet Take 1 tablet (25 mcg) by mouth every other day 90 tablet 3    polyethylene glycol (GOLYTELY) 236 g suspension Two days before procedure at 5PM fill first container with water. Mix and drink an 8 oz glass every 10-15 minutes until HALF of the container is gone. Place the remainder in the refrigerator. One day before procedure at 5PM drink second half of bowel prep. Drink an 8 oz glass every 10-15 minutes until it is gone. Day of procedure 6 hours before arrival time fill the 2nd container with water. Mix and drink an 8 oz glass every 10-15 minutes until HALF of the container is gone. Discard the remaining solution. (Patient not taking: Reported on 7/9/2024) 8000 mL 0    rosuvastatin (CRESTOR) 20 MG tablet Take 1 tablet (20 mg) by mouth daily for 360 days 90 tablet 3        ALLERGIES:     Allergies   Allergen Reactions    Codeine Sulfate Itching    Shrimp Swelling    Oxycodone Other (See Comments)    Shrimp Extract Swelling    Bactrim [Sulfamethoxazole W/Trimethoprim]      Patient unable to recall    Biaxin [Clarithromycin]     Chlorthalidone Nausea and Vomiting    Clonidine   "   Darvon [Propoxyphene Hcl]     Dilaudid [Hydromorphone] Visual Disturbance and Hallucination     Tolerated in April 2024 hospital admissions    Gabapentin Fatigue and Confusion     \"felt drunk\"    Indomethacin     Levaquin [Levofloxacin Hemihydrate]     Morphine Sulfate     Percocet [Oxycodone-Acetaminophen] Hallucination    Pregabalin Itching and Fatigue    Simvastatin Palpitations     Muscle weakness, leg cramping      Spironolactone      Dehydrated      Terazosin         FAMILY HISTORY:     Family History   Problem Relation Age of Onset    Cancer Sister 82        bladder cancer        SOCIAL HISTORY:     Social History     Socioeconomic History    Marital status:     Number of children: 4   Occupational History     Employer: [employer]   Tobacco Use    Smoking status: Former     Current packs/day: 0.30     Average packs/day: 0.3 packs/day for 15.0 years (4.5 ttl pk-yrs)     Types: Cigarettes    Smokeless tobacco: Never    Tobacco comments:     Quit 35+ years ago   Vaping Use    Vaping status: Never Used   Substance and Sexual Activity    Drug use: No    Sexual activity: Not Currently     Partners: Male     Birth control/protection: Post-menopausal   Other Topics Concern    Blood Transfusions Yes    Exercise No     Social Determinants of Health     Financial Resource Strain: Low Risk  (11/15/2023)    Financial Resource Strain     Within the past 12 months, have you or your family members you live with been unable to get utilities (heat, electricity) when it was really needed?: No   Food Insecurity: Low Risk  (11/15/2023)    Food Insecurity     Within the past 12 months, did you worry that your food would run out before you got money to buy more?: No     Within the past 12 months, did the food you bought just not last and you didn t have money to get more?: No   Transportation Needs: Low Risk  (11/15/2023)    Transportation Needs     Within the past 12 months, has lack of transportation kept " you from medical appointments, getting your medicines, non-medical meetings or appointments, work, or from getting things that you need?: No   Interpersonal Safety: Low Risk  (11/15/2023)    Interpersonal Safety     Do you feel physically and emotionally safe where you currently live?: Yes     Within the past 12 months, have you been hit, slapped, kicked or otherwise physically hurt by someone?: No     Within the past 12 months, have you been humiliated or emotionally abused in other ways by your partner or ex-partner?: No   Housing Stability: Low Risk  (11/15/2023)    Housing Stability     Do you have housing? : Yes     Are you worried about losing your housing?: No        REVIEW OF SYSTEMS:     Constitutional: No fevers or chills  Skin: No new rash or itching  Eyes: No acute change in vision  Ears/Nose/Throat: No purulent rhinorrhea, new hearing loss, or new vertigo  Respiratory: No cough or hemoptysis  Cardiovascular: See HPI  Gastrointestinal: No change in appetite, vomiting, hematemesis or diarrhea  Genitourinary: No dysuria or hematuria  Musculoskeletal: No new back pain, neck pain or muscle pain  Neurologic: No new headaches, focal weakness or behavior changes  Psychiatric: No hallucinations, excessive alcohol consumption or illegal drug usage  Hematologic/Lymphatic/Immunologic: No bleeding, chills, fever, night sweats or weight loss  Endocrine: No new cold intolerance, heat intolerance, polyphagia, polydipsia or polyuria      PHYSICAL EXAMINATION:     BP (!) 156/80 (BP Location: Right arm, Patient Position: Chair, Cuff Size: Adult Regular)   Pulse 80   Wt 66.1 kg (145 lb 11.2 oz)   SpO2 97%   BMI 25.01 kg/m      GENERAL: No acute distress.  HEENT: EOMI. Sclerae white, not injected. Nares clear. Pharynx without erythema or exudate.   Neck: No adenopathy. No thyromegaly. Symmetrical.   Heart: Irregularly irregular rate and rhythm. No murmur.   Lungs: Clear to auscultation. No ronchi, wheezes, rales.    Abdomen: Soft, nontender, nondistended. Bowel sounds present.  Extremities: No clubbing, cyanosis, or edema.   Neurologic: Alert and oriented to person/place/time, normal speech and affect. No focal deficits.  Skin: No petechiae, purpura or rash.     LABORATORY DATA:     LIPID RESULTS:  Lab Results   Component Value Date    CHOL 105 11/20/2023    CHOL 153 08/26/2020    HDL 44 (L) 11/20/2023    HDL 52 08/26/2020    LDL 36 11/20/2023    LDL 71 08/26/2020    TRIG 124 11/20/2023    TRIG 149 08/26/2020    CHOLHDLRATIO 2.8 12/30/2014       LIVER ENZYME RESULTS:  Lab Results   Component Value Date    AST 31 07/03/2024    AST 15 06/25/2018    ALT 16 07/03/2024    ALT 14 06/25/2018       CBC RESULTS:  Lab Results   Component Value Date    WBC 6.2 07/15/2024    WBC 5.4 08/26/2020    RBC 3.26 (L) 07/15/2024    RBC 3.58 (L) 08/26/2020    HGB 9.8 (L) 07/15/2024    HGB 11.2 (L) 08/26/2020    HCT 31.0 (L) 07/15/2024    HCT 36.0 08/26/2020    MCV 95 07/15/2024     (H) 08/26/2020    MCH 30.1 07/15/2024    MCH 31.3 08/26/2020    MCHC 31.6 07/15/2024    MCHC 31.1 (L) 08/26/2020    RDW 15.6 (H) 07/15/2024    RDW 14.9 08/26/2020     (L) 07/15/2024     (L) 08/26/2020       BMP RESULTS:  Lab Results   Component Value Date     07/15/2024     08/26/2020    POTASSIUM 4.4 07/15/2024    POTASSIUM 4.0 04/13/2024    POTASSIUM 4.8 11/21/2022    POTASSIUM 4.4 08/26/2020    CHLORIDE 105 07/15/2024    CHLORIDE 110 (H) 11/21/2022    CHLORIDE 111 (H) 08/26/2020    CO2 24 07/15/2024    CO2 24 11/21/2022    CO2 24 08/26/2020    ANIONGAP 12 07/15/2024    ANIONGAP 6 11/21/2022    ANIONGAP 8 08/26/2020    GLC 80 07/15/2024     (H) 04/21/2024    GLC 79 11/21/2022    GLC 84 08/26/2020    BUN 42.4 (H) 07/15/2024    BUN 35 (H) 11/21/2022    BUN 41 (H) 08/26/2020    CR 1.75 (H) 07/15/2024    CR 1.70 (H) 08/26/2020    GFRESTIMATED 28 (L) 07/15/2024    GFRESTIMATED 27 (L) 08/26/2020    GFRESTBLACK 32 (L) 08/26/2020    ALEXANDRO  "9.2 07/15/2024    ALEXANDRO 9.0 08/26/2020        A1C RESULTS:  No results found for: \"A1C\"    INR RESULTS:  Lab Results   Component Value Date    INR 1.54 (H) 07/03/2024    INR 1.49 (H) 06/28/2024    INR 1.08 12/27/2013    INR 1.15 (H) 08/06/2010          PROCEDURES & FURTHER ASSESSMENTS:     Echocardiogram on 10/23:  Interpretation Summary  Global and regional left ventricular function is normal with an EF of 55-60%.  Right ventricular function, chamber size, wall motion, and thickness are  normal.  Mild to moderate mitral insufficiency is present.  Mild tricuspid insufficiency is present.  The right ventricular systolic pressure is 35mmHg above the right atrial  pressure.  The inferior vena cava is normal.  No pericardial effusion is present.    Coronary Angiogram 11/6/23      [id number] Score: 4 need >=3 (4.8% annual stroke risk)  HASBLED Score: 3 (5.8% risk of bleeding)     CLINICAL IMPRESSION:     Tessa Mansfield is a 89 year old female with atrial fibrillation who is a good candidate for left atrial appendage closure. Long term anticoagulation for atrial fibrillation is not a good option secondary to her GI bleeding issues.      The patient is suitable for short-term anticoagulation and is expected to tolerate a brief period of anticoagulation of at least 45 days following the procedure.       The risks and benefits of left atrial closure were discussed and the patient agrees to proceed with further evaluation and closure of their left atrial appendage if possible.      Plan Summary:  1) Left atrial appendage closure with the WATCHMAN device, MICHELL for watchman planning given GFR and renal disease.    Patient was evaluated in clinic with Dr. Radha MD of interventional cardiology.      Michael Nicholson MD  Interventional Cardiology Fellow Physician PGY-7  [facility] [employer]  Division of Cardiovascular Disease  [facility]  Pager [phone] (9AM-5PM)  After Hours Pager " [phone]    CC  Patient Care Team:  Pedro Gomez MD as PCP - General (Family Practice)  Adriana Lilly MD as MD (Urology)  Lilly Ritchie, RN as Nurse Coordinator  Tyrell Santoyo MD as MD (Cardiology)  Henny Larry MD as MD (Otolaryngology)  Jorge Singh MD as Assigned Pulmonology Provider  Marzena Martin MD as Assigned Nephrology Provider  Pedro Gomez MD as Assigned PCP  Gladys Ratliff RN as Specialty Care Coordinator (Nephrology)  Randa Ann NP as Assigned Heart and Vascular Provider  Felix Marquez MD as Assigned Neuroscience Provider  Chad Blair MD as MD (Critical Care)  Randa Ann NP as Nurse Practitioner (Cardiovascular Disease)  Barbara Dimas RN as Specialty Care Coordinator (Pharmacy)  [facility]  Tanya Curry RN as Lead Care Coordinator (Primary Care - CC)  EVA VALDOVINOS      Patient seen and examined with Cardiovascular fellow and agree with the assessment and plan described above.     Rodrick Garcia M.D.  Interventional Cardiology  [facility]

## 2024-07-18 NOTE — TELEPHONE ENCOUNTER
Called back Ruiz from [facility] on secure box, for Order(s): Home Care Orders: Physical Therapy (PT): PT orders: every other wk for 8wks     NEELIMA Rolle at 12:38 PM on 7/18/2024

## 2024-07-18 NOTE — LETTER
7/18/2024      RE: Tessa Mansfield  [address]  [facility] [address]       Dear Colleague,    Thank you for the opportunity to participate in the care of your patient, Tessa Mansfield, at the [facility]ART [facility] [place] [facility]. Please see a copy of my visit note below.        [facility] HEART CARE  CARDIOVASCULAR DIVISION    INITIAL CONSULTATION        PERTINENT CLINICAL HISTORY:     Tessa Mansfield is a very pleasant 89 year old female with atrial fibrillation referred to our clinic for evaluation and consideration of left atrial appendage closure.      Their remaining medical history is also notable for recent hospitalization for blood loss anemia, CAD s/p multiple stents most recent in November 2023, sick sinus syndrome s/p dual-chamber pacemaker, paroxysmal A-fib (on Eliquis), hyperlipidemia, hypertension, CKD stage IV.     Over the last few months she has had several hospital admissions for concern referred acute on chronic GI bleeds.    Admitted 5/1 to 5/7 for melena. EGD performed at that time demonstrating possibly an AVM oozing in setting of anticoagulation. Additionally may have possible AVM of right colon vs small bowel. On 7/3 patient presented to the emergency department with concern of fatgue. Reports possible melena (taking oral iron). Hgb upon arrival 6.9. Transfusion initiated. 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      PAST MEDICAL HISTORY:     Past Medical History:   Diagnosis Date     CAD (coronary artery disease)      CAD s/p PCI+BMS to MRCA 10/2002, PCI to mLCX 2/2003, PCI+DESx2 to pLAD 11/2007, PCI+DESx1 to dRCA 12/2007, PCI+ALVAREZ to RPDA 7/2013; on indefinite DAPT  10/27/2002    10/27/2002: AMI s/p PCI+BMS (3.5x18mm Bx velocity) to mRCA 2/5/2003: Back pain; PCI+stent to mLCX c/b distal  embolization/slow flow 4/11/2003: Unstable angina; PCI+stent (Hepacoat velocity) to mLAD 11/27/2007: PCI+DESx2 to pLAD (IVUS w/ calcification of LMCA and ulcerated plaque pLAD, 80% dRCA; also had 80% dLCX, too small to stent) 12/11/2007: Staged PCI. PCI+DESx1 (3.5x13mm Cypher) to dRCA (indefinite DAPT recommended at this time) 6/27/2008: Angina. mLCX stent 70% ISR. LAD and RCA stents patent. Myocardium at risk from LCX felt to be small, medical management preferred to PCI. 11/10/2009: Angina. Findings unchanged from 6/27/2008, FFR LAD 0.90. Medical management recommended. 7/23/2013: Unstable angina; PCI+ALVAREZ to mPDA. Diagnostic findings: LMCA 40% distal. LAD: pLAD stents patent mLAD 30% ISR dLAD 50% diffuse, D2 diffuse disease. LCX 80% mid ISR. RCA diffuse <30% mid, RPLAs diffuse disease, RPDA 100% occlusion.       Cardiac Pacemaker- [employer]tr[employer], dual chamber- NOT dependant 11/28/2007     CKD (chronic kidney disease), stage IV (H)      Hyperlipidemia LDL goal <70      Hypertension      Stented coronary artery 10-    RCA     Stented coronary artery 2-5-2003    LCx     Stented coronary artery 4-    LAD     Stented coronary artery 11-    LAD     Stented coronary artery 12-    RCA     Transient complete heart block (H) 11/28/2007        PAST SURGICAL HISTORY:     Past Surgical History:   Procedure Laterality Date     CHOLECYSTECTOMY       CV CORONARY ANGIOGRAM N/A 6/17/2022    Procedure: Coronary Angiogram;  Surgeon: Constantine Macias MD;  Location: [facility] CARDIAC CATH LAB     CV CORONARY ANGIOGRAM N/A 7/17/2023    Procedure: Coronary Angiogram;  Surgeon: Constantine Macias MD;  Location: [facility] CARDIAC CATH LAB     CV PCI N/A 6/17/2022    Procedure: Percutaneous Coronary Intervention;  Surgeon: Constantine Macias MD;  Location: [facility] CARDIAC CATH LAB     CV PCI N/A 7/17/2023    Procedure: Percutaneous Coronary Intervention;  Surgeon: Constantine Macias MD;  Location:   HEART CARDIAC CATH LAB     CV PCI N/A 11/6/2023    Procedure: Percutaneous Coronary Intervention;  Surgeon: Constantine Macias MD;  Location:  HEART CARDIAC CATH LAB     CV RIGHT HEART CATH MEASUREMENTS RECORDED N/A 6/17/2022    Procedure: Right Heart Catheterization;  Surgeon: Constantine Macias MD;  Location:  HEART CARDIAC CATH LAB     EP PACEMAKER N/A 10/15/2019    Procedure: EP PACEMAKER;  Surgeon: Anthony Zhu MD;  Location:  HEART CARDIAC CATH LAB     ESOPHAGOSCOPY, GASTROSCOPY, DUODENOSCOPY (EGD), COMBINED N/A 7/20/2023    Procedure: Esophagoscopy, gastroscopy, duodenoscopy (EGD), combined;  Surgeon: Bekah Dash DO;  [facility]     ESOPHAGOSCOPY, GASTROSCOPY, DUODENOSCOPY (EGD), COMBINED N/A 5/2/2024    Procedure: Esophagoscopy, gastroscopy, duodenoscopy (EGD), combined;  Surgeon: Tavo Donaldson MD;  [facility]     HC PPM INSERTION NEW/REPLACEMENT W/ ATRIAL&VENTRICULAR LEAD  11-     HYSTERECTOMY       LAPAROTOMY EXPLORATORY N/A 4/13/2024    Procedure: Laparotomy exploratory, Wound Vac Placement.;  Surgeon: Anthony Trammell MD;  [facility]     LAPAROTOMY EXPLORATORY N/A 4/14/2024    Procedure: Abdominal Re-Exploration and Closure;  Surgeon: Anthony Trammell MD;  [facility]     LAPAROTOMY EXPLORATORY N/A 4/13/2024    Procedure: Exploratory Laparotomy;  Surgeon: Anthony Trammell MD;  [facility]        CURRENT MEDICATIONS:     Current Outpatient Medications   Medication Sig Dispense Refill     acetaminophen (TYLENOL) 325 MG tablet Take 975 mg by mouth nightly as needed for pain       allopurinol (ZYLOPRIM) 100 MG tablet Take 1 tablet (100 mg) by mouth daily 90 tablet 0     amLODIPine (NORVASC) 10 MG tablet Take 1 tablet (10 mg) by mouth daily 90 tablet 3     apixaban ANTICOAGULANT (ELIQUIS) 2.5 MG tablet Take 1 tablet (2.5 mg) by mouth 2 times daily 180 tablet 3     clopidogrel (PLAVIX) 75 MG tablet Take 1 tablet (75 mg) by mouth  daily 90 tablet 1     fluticasone-salmeterol (ADVAIR) 250-50 MCG/ACT inhaler INHALE 1 DOSE BY MOUTH TWICE DAILY 60 each 8     furosemide (LASIX) 40 MG tablet Take 1 tablet (40 mg) by mouth daily May take an additional 20 mg at noon as needed for swelling. 90 tablet 2     isosorbide mononitrate (ISMO/MONOKET) 20 MG tablet Take 3 tablets (60 mg) by mouth 2 times daily 180 tablet 3     methocarbamol (ROBAXIN) 500 MG tablet Take 1 tablet (500 mg) by mouth every 6 hours as needed for muscle spasms 30 tablet 0     metoprolol tartrate (LOPRESSOR) 25 MG tablet Take 2 tablets (50 mg) by mouth 2 times daily 180 tablet 3     Multiple Vitamins-Minerals (PRESERVISION AREDS 2+MULTI VIT PO) Take 1 capsule by mouth 2 times daily       omeprazole (PRILOSEC) 40 MG DR capsule Take 1 capsule (40 mg) by mouth daily 90 capsule 3     potassium chloride ER (K-TAB) 20 MEQ CR tablet Take 1 tablet (20 mEq) by mouth daily 90 tablet 3     sodium bicarbonate 650 MG tablet Take 1 tablet (650 mg) by mouth 2 times daily 60 tablet 11     timolol maleate (TIMOPTIC) 0.5 % ophthalmic solution INSTILL 1 DROP INTO EACH EYE ONCE DAILY IN THE MORNING       vitamin B-12 (CYANOCOBALAMIN) 500 MCG tablet Take 1 tablet by mouth daily       vitamin D3 25 mcg (1000 units) tablet Take 1 tablet (25 mcg) by mouth every other day 90 tablet 3     polyethylene glycol (GOLYTELY) 236 g suspension Two days before procedure at 5PM fill first container with water. Mix and drink an 8 oz glass every 10-15 minutes until HALF of the container is gone. Place the remainder in the refrigerator. One day before procedure at 5PM drink second half of bowel prep. Drink an 8 oz glass every 10-15 minutes until it is gone. Day of procedure 6 hours before arrival time fill the 2nd container with water. Mix and drink an 8 oz glass every 10-15 minutes until HALF of the container is gone. Discard the remaining solution. (Patient not taking: Reported on 7/9/2024) 8000 mL 0     rosuvastatin  "(CRESTOR) 20 MG tablet Take 1 tablet (20 mg) by mouth daily for 360 days 90 tablet 3        ALLERGIES:     Allergies   Allergen Reactions     Codeine Sulfate Itching     Shrimp Swelling     Oxycodone Other (See Comments)     Shrimp Extract Swelling     Bactrim [Sulfamethoxazole W/Trimethoprim]      Patient unable to recall     Biaxin [Clarithromycin]      Chlorthalidone Nausea and Vomiting     Clonidine      Darvon [Propoxyphene Hcl]      Dilaudid [Hydromorphone] Visual Disturbance and Hallucination     Tolerated in April 2024 hospital admissions     Gabapentin Fatigue and Confusion     \"felt drunk\"     Indomethacin      Levaquin [Levofloxacin Hemihydrate]      Morphine Sulfate      Percocet [Oxycodone-Acetaminophen] Hallucination     Pregabalin Itching and Fatigue     Simvastatin Palpitations     Muscle weakness, leg cramping       Spironolactone      Dehydrated       Terazosin         FAMILY HISTORY:     Family History   Problem Relation Age of Onset     Cancer Sister 82        bladder cancer        SOCIAL HISTORY:     Social History     Socioeconomic History     Marital status:      Number of children: 4   Occupational History     Employer: [employer]   Tobacco Use     Smoking status: Former     Current packs/day: 0.30     Average packs/day: 0.3 packs/day for 15.0 years (4.5 ttl pk-yrs)     Types: Cigarettes     Smokeless tobacco: Never     Tobacco comments:     Quit 35+ years ago   Vaping Use     Vaping status: Never Used   Substance and Sexual Activity     Drug use: No     Sexual activity: Not Currently     Partners: Male     Birth control/protection: Post-menopausal   Other Topics Concern     Blood Transfusions Yes     Exercise No     Social Determinants of Health     Financial Resource Strain: Low Risk  (11/15/2023)    Financial Resource Strain      Within the past 12 months, have you or your family members you live with been unable to get utilities (heat, electricity) when it was really " needed?: No   Food Insecurity: Low Risk  (11/15/2023)    Food Insecurity      Within the past 12 months, did you worry that your food would run out before you got money to buy more?: No      Within the past 12 months, did the food you bought just not last and you didn t have money to get more?: No   Transportation Needs: Low Risk  (11/15/2023)    Transportation Needs      Within the past 12 months, has lack of transportation kept you from medical appointments, getting your medicines, non-medical meetings or appointments, work, or from getting things that you need?: No   Interpersonal Safety: Low Risk  (11/15/2023)    Interpersonal Safety      Do you feel physically and emotionally safe where you currently live?: Yes      Within the past 12 months, have you been hit, slapped, kicked or otherwise physically hurt by someone?: No      Within the past 12 months, have you been humiliated or emotionally abused in other ways by your partner or ex-partner?: No   Housing Stability: Low Risk  (11/15/2023)    Housing Stability      Do you have housing? : Yes      Are you worried about losing your housing?: No        REVIEW OF SYSTEMS:     Constitutional: No fevers or chills  Skin: No new rash or itching  Eyes: No acute change in vision  Ears/Nose/Throat: No purulent rhinorrhea, new hearing loss, or new vertigo  Respiratory: No cough or hemoptysis  Cardiovascular: See HPI  Gastrointestinal: No change in appetite, vomiting, hematemesis or diarrhea  Genitourinary: No dysuria or hematuria  Musculoskeletal: No new back pain, neck pain or muscle pain  Neurologic: No new headaches, focal weakness or behavior changes  Psychiatric: No hallucinations, excessive alcohol consumption or illegal drug usage  Hematologic/Lymphatic/Immunologic: No bleeding, chills, fever, night sweats or weight loss  Endocrine: No new cold intolerance, heat intolerance, polyphagia, polydipsia or polyuria      PHYSICAL EXAMINATION:     BP (!) 156/80 (BP  Location: Right arm, Patient Position: Chair, Cuff Size: Adult Regular)   Pulse 80   Wt 66.1 kg (145 lb 11.2 oz)   SpO2 97%   BMI 25.01 kg/m      GENERAL: No acute distress.  HEENT: EOMI. Sclerae white, not injected. Nares clear. Pharynx without erythema or exudate.   Neck: No adenopathy. No thyromegaly. Symmetrical.   Heart: Irregularly irregular rate and rhythm. No murmur.   Lungs: Clear to auscultation. No ronchi, wheezes, rales.   Abdomen: Soft, nontender, nondistended. Bowel sounds present.  Extremities: No clubbing, cyanosis, or edema.   Neurologic: Alert and oriented to person/place/time, normal speech and affect. No focal deficits.  Skin: No petechiae, purpura or rash.     LABORATORY DATA:     LIPID RESULTS:  Lab Results   Component Value Date    CHOL 105 11/20/2023    CHOL 153 08/26/2020    HDL 44 (L) 11/20/2023    HDL 52 08/26/2020    LDL 36 11/20/2023    LDL 71 08/26/2020    TRIG 124 11/20/2023    TRIG 149 08/26/2020    CHOLHDLRATIO 2.8 12/30/2014       LIVER ENZYME RESULTS:  Lab Results   Component Value Date    AST 31 07/03/2024    AST 15 06/25/2018    ALT 16 07/03/2024    ALT 14 06/25/2018       CBC RESULTS:  Lab Results   Component Value Date    WBC 6.2 07/15/2024    WBC 5.4 08/26/2020    RBC 3.26 (L) 07/15/2024    RBC 3.58 (L) 08/26/2020    HGB 9.8 (L) 07/15/2024    HGB 11.2 (L) 08/26/2020    HCT 31.0 (L) 07/15/2024    HCT 36.0 08/26/2020    MCV 95 07/15/2024     (H) 08/26/2020    MCH 30.1 07/15/2024    MCH 31.3 08/26/2020    MCHC 31.6 07/15/2024    MCHC 31.1 (L) 08/26/2020    RDW 15.6 (H) 07/15/2024    RDW 14.9 08/26/2020     (L) 07/15/2024     (L) 08/26/2020       BMP RESULTS:  Lab Results   Component Value Date     07/15/2024     08/26/2020    POTASSIUM 4.4 07/15/2024    POTASSIUM 4.0 04/13/2024    POTASSIUM 4.8 11/21/2022    POTASSIUM 4.4 08/26/2020    CHLORIDE 105 07/15/2024    CHLORIDE 110 (H) 11/21/2022    CHLORIDE 111 (H) 08/26/2020    CO2 24 07/15/2024     "CO2 24 11/21/2022    CO2 24 08/26/2020    ANIONGAP 12 07/15/2024    ANIONGAP 6 11/21/2022    ANIONGAP 8 08/26/2020    GLC 80 07/15/2024     (H) 04/21/2024    GLC 79 11/21/2022    GLC 84 08/26/2020    BUN 42.4 (H) 07/15/2024    BUN 35 (H) 11/21/2022    BUN 41 (H) 08/26/2020    CR 1.75 (H) 07/15/2024    CR 1.70 (H) 08/26/2020    GFRESTIMATED 28 (L) 07/15/2024    GFRESTIMATED 27 (L) 08/26/2020    GFRESTBLACK 32 (L) 08/26/2020    ALEXANDRO 9.2 07/15/2024    ALEXANDRO 9.0 08/26/2020        A1C RESULTS:  No results found for: \"A1C\"    INR RESULTS:  Lab Results   Component Value Date    INR 1.54 (H) 07/03/2024    INR 1.49 (H) 06/28/2024    INR 1.08 12/27/2013    INR 1.15 (H) 08/06/2010          PROCEDURES & FURTHER ASSESSMENTS:     Echocardiogram on 10/23:  Interpretation Summary  Global and regional left ventricular function is normal with an EF of 55-60%.  Right ventricular function, chamber size, wall motion, and thickness are  normal.  Mild to moderate mitral insufficiency is present.  Mild tricuspid insufficiency is present.  The right ventricular systolic pressure is 35mmHg above the right atrial  pressure.  The inferior vena cava is normal.  No pericardial effusion is present.    Coronary Angiogram 11/6/23      [id number] Score: 4 need >=3 (4.8% annual stroke risk)  HASBLED Score: 3 (5.8% risk of bleeding)     CLINICAL IMPRESSION:     Tessa Mansfield is a 89 year old female with atrial fibrillation who is a good candidate for left atrial appendage closure. Long term anticoagulation for atrial fibrillation is not a good option secondary to her GI bleeding issues.      The patient is suitable for short-term anticoagulation and is expected to tolerate a brief period of anticoagulation of at least 45 days following the procedure.       The risks and benefits of left atrial closure were discussed and the patient agrees to proceed with further evaluation and closure of their left atrial appendage if possible.      Plan " Summary:  1) Left atrial appendage closure with the WATCHMAN device, MICHELL for watchman planning given GFR and renal disease.    Patient was evaluated in clinic with Dr. Radha MD of interventional cardiology.      Michael Nicholson MD  Interventional Cardiology Fellow Physician PGY-7  [facility] [employer]  Division of Cardiovascular Disease  [facility]  Pager [phone] (9AM-5PM)  After Hours Pager [phone]    CC  Patient Care Team:  Pedro Gomez MD as PCP - General (Family Practice)  Adriana Lilly MD as MD (Urology)  Lilly Ritchie, CAROL as Nurse Coordinator  Tyrell Santoyo MD as MD (Cardiology)  Henny Larry MD as MD (Otolaryngology)  Jorge Singh MD as Assigned Pulmonology Provider  Marzena Martin MD as Assigned Nephrology Provider  Pedro Gomez MD as Assigned PCP  Gladys Ratliff, RN as Specialty Care Coordinator (Nephrology)  Randa Ann NP as Assigned Heart and Vascular Provider  Felix Marquez MD as Assigned Neuroscience Provider  Johnnie, Chad Cruz MD as MD (Critical Care)  Randa Ann NP as Nurse Practitioner (Cardiovascular Disease)  Barbara Dimas, RN as Specialty Care Coordinator (Pharmacy)  [facility] CARE as Home Care [employer]  Tanya Curry, RN as Lead Care Coordinator (Primary Care - CC)  EVA VALDOVINOS      Patient seen and examined with Cardiovascular fellow and agree with the assessment and plan described above.     Rodrick Garcia M.D.  Interventional Cardiology  [facility]         Please do not hesitate to contact me if you have any questions/concerns.     Sincerely,     Rodrick Garcia MD

## 2024-07-18 NOTE — PROGRESS NOTES
Type of Form Received: [facility] Order [id number]    Form Received (Date) 7/17/24   Form Filled out Yes, faxed 7/31   Placed in provider folder Yes

## 2024-07-18 NOTE — PATIENT INSTRUCTIONS
You were seen today in the Cardiovascular Clinic at the [facility].      Cardiology Providers you saw during your visit:  Dr. Garcia    Recommendations:    MICHELL on day of watchman procedure         Nursing questions:  Masha MEDINA;  Jyoti messages are great! Otherwise [phone] if you don't hear back within 24 hrs please call back.   For emergencies call 911.      Thank you for your visit today!     Masha Johnson RN  Lead Structural Heart Coordinator  TAVR, MitraClip and Watchman

## 2024-07-19 ENCOUNTER — TELEPHONE (OUTPATIENT)
Dept: CARDIOLOGY | Facility: CLINIC | Age: 89
End: 2024-07-19
Payer: COMMERCIAL

## 2024-07-19 DIAGNOSIS — I35.0 AORTIC VALVE STENOSIS: Primary | ICD-10-CM

## 2024-07-19 DIAGNOSIS — I48.91 ATRIAL FIBRILLATION (H): ICD-10-CM

## 2024-07-19 DIAGNOSIS — K92.2 GASTROINTESTINAL HEMORRHAGE, UNSPECIFIED GASTROINTESTINAL HEMORRHAGE TYPE: Primary | ICD-10-CM

## 2024-07-19 NOTE — TELEPHONE ENCOUNTER
Telephone call to Tessa to offer Watchman procedure date of August 8th. Discussed MICHELL prior to watchman procedure may show that watchman device is not feasible, in that case case would be aborted. Patient aware of this option and asked appropriate questions. Case scheduled for August 8th.

## 2024-07-19 NOTE — TELEPHONE ENCOUNTER
Attempted telephone call to aurora Zarate voicemail.    Masha Johnson, RN, BSN  Lead Structural Heart Coordinator  TAVR, MitraClip and Watchman

## 2024-07-22 ENCOUNTER — DOCUMENTATION ONLY (OUTPATIENT)
Dept: FAMILY MEDICINE | Facility: CLINIC | Age: 89
End: 2024-07-22

## 2024-07-22 RX ORDER — CEFAZOLIN SODIUM 2 G/50ML
2 SOLUTION INTRAVENOUS
Status: CANCELLED | OUTPATIENT
Start: 2024-07-22

## 2024-07-22 RX ORDER — LIDOCAINE 40 MG/G
CREAM TOPICAL
Status: CANCELLED | OUTPATIENT
Start: 2024-07-22

## 2024-07-22 RX ORDER — POTASSIUM CHLORIDE 1500 MG/1
40 TABLET, EXTENDED RELEASE ORAL
Status: CANCELLED | OUTPATIENT
Start: 2024-07-22

## 2024-07-22 RX ORDER — ASPIRIN 81 MG/1
81 TABLET ORAL ONCE
Status: CANCELLED | OUTPATIENT
Start: 2024-07-22 | End: 2024-07-22

## 2024-07-22 RX ORDER — SODIUM CHLORIDE 9 MG/ML
1000 INJECTION, SOLUTION INTRAVENOUS CONTINUOUS
Status: CANCELLED | OUTPATIENT
Start: 2024-07-22

## 2024-07-22 RX ORDER — POTASSIUM CHLORIDE 1500 MG/1
20 TABLET, EXTENDED RELEASE ORAL
Status: CANCELLED | OUTPATIENT
Start: 2024-07-22

## 2024-07-22 NOTE — TELEPHONE ENCOUNTER
Jessica    Check in to the hospital, at 0600.      Please disregard any Mychart messages or reminders in regards to ECHO scheduling, this is done as part of procedure.   If you are unsure if your check in time has changed, please call number [phone].    3rd floor: [address],     [facility] [place]   [address] [place]  [address]        parking is available in front of the hospital, 24 hours a day  -  Stop at the Information Desk and the admissions desk in the lobby.    -------------------------------------------------------------------------  Nothing to eat after midnight  Clear liquids like water, apple juice or 7up/Sprite is OK up to two hours prior to your scheduled procedure.    You may take your medications in the morning with a sip of water.      *Please use the special cleansing wipes, received at your pre-op History and Physical, the night before and the morning of your procedure.    Please focus the use of these wipes from neck to groin, avoiding the face and genital area.    If you did NOT receive these special cleansing wipes at your pre-op History and Physical, then:   * Please use a special soap such as hibicleanse or chlorhexadine.  (This can be purchased, over the counter, at a pharmacy) If this is not available, please use an antibacterial soap.  * Take a shower, using soap, the night before the procedure, and the morning of the procedure.    Please focus the use of this soap from neck to groin, avoiding the face and genital area.    Contrast Allergy:   No    Medications Instructions:  Please TAKE the following NEW medications:  325 mg Asprin: Please take day before and day of procedure        Please TAKE the following medications morning of procedure as prescribed:    acetaminophen (TYLENOL)     allopurinol (ZYLOPRIM)     amLODIPine (NORVASC)     clopidogrel (PLAVIX)     fluticasone-salmeterol (ADVAIR)     isosorbide mononitrate (ISMO/MONOKET)      methocarbamol (ROBAXIN)     metoprolol tartrate (LOPRESSOR)     omeprazole (PRILOSEC)     rosuvastatin (CRESTOR)     timolol maleate (TIMOPTIC) 0.5 % ophthalmic solution      Please HOLD the following medications morning of procedure:    furosemide (LASIX) 40 MG tablet    Multiple Vitamins-Minerals (PRESERVISION AREDS 2+MULTI VIT PO)    potassium chloride ER (K-TAB)     sodium bicarbonate     vitamin B-12 (CYANOCOBALAMIN)     vitamin D3 25 mcg (1000 units)       Special Medication Considerations:      Anticoagulation medication HOLD:  Eliquis (apixaban)  2 days beforehand: your last dose will be on 8/5.        If any questions please contact:  Masha Johnson RN  Structural Heart Care Coordinator  [facility]  Office: [phone]

## 2024-07-22 NOTE — PROGRESS NOTES
Type of Form Received: [facility] Order [id number], [id number]    Form Received (Date) 7/20/24   Form Filled out Yes, faxed 7/31   Placed in provider folder Yes

## 2024-07-25 LAB
ABO/RH(D): NORMAL
ANTIBODY SCREEN: NEGATIVE
SPECIMEN EXPIRATION DATE: NORMAL

## 2024-07-26 ENCOUNTER — LAB (OUTPATIENT)
Dept: LAB | Facility: CLINIC | Age: 89
End: 2024-07-26
Payer: COMMERCIAL

## 2024-07-26 DIAGNOSIS — I35.0 AORTIC VALVE STENOSIS: ICD-10-CM

## 2024-07-26 DIAGNOSIS — N18.31 STAGE 3A CHRONIC KIDNEY DISEASE (H): Primary | ICD-10-CM

## 2024-07-26 DIAGNOSIS — I48.91 ATRIAL FIBRILLATION, UNSPECIFIED TYPE (H): Primary | ICD-10-CM

## 2024-07-26 LAB
ACANTHOCYTES BLD QL SMEAR: NORMAL
ANION GAP SERPL CALCULATED.3IONS-SCNC: 13 MMOL/L (ref 7–15)
AUER BODIES BLD QL SMEAR: NORMAL
BASO STIPL BLD QL SMEAR: NORMAL
BITE CELLS BLD QL SMEAR: NORMAL
BLISTER CELLS BLD QL SMEAR: NORMAL
BUN SERPL-MCNC: 43.6 MG/DL (ref 8–23)
BURR CELLS BLD QL SMEAR: NORMAL
CALCIUM SERPL-MCNC: 9 MG/DL (ref 8.8–10.4)
CHLORIDE SERPL-SCNC: 104 MMOL/L (ref 98–107)
CREAT SERPL-MCNC: 1.68 MG/DL (ref 0.51–0.95)
CREAT UR-MCNC: 21.1 MG/DL
DACRYOCYTES BLD QL SMEAR: NORMAL
EGFRCR SERPLBLD CKD-EPI 2021: 29 ML/MIN/1.73M2
ELLIPTOCYTES BLD QL SMEAR: NORMAL
ERYTHROCYTE [DISTWIDTH] IN BLOOD BY AUTOMATED COUNT: 15.8 % (ref 10–15)
FRAGMENTS BLD QL SMEAR: NORMAL
GLUCOSE SERPL-MCNC: 87 MG/DL (ref 70–99)
HCO3 SERPL-SCNC: 23 MMOL/L (ref 22–29)
HCT VFR BLD AUTO: 31.1 % (ref 35–47)
HGB BLD-MCNC: 9.9 G/DL (ref 11.7–15.7)
HGB C CRYSTALS: NORMAL
HOWELL-JOLLY BOD BLD QL SMEAR: NORMAL
MCH RBC QN AUTO: 30.3 PG (ref 26.5–33)
MCHC RBC AUTO-ENTMCNC: 31.8 G/DL (ref 31.5–36.5)
MCV RBC AUTO: 95 FL (ref 78–100)
MICROALBUMIN UR-MCNC: 83.7 MG/L
MICROALBUMIN/CREAT UR: 396.68 MG/G CR (ref 0–25)
NEUTS HYPERSEG BLD QL SMEAR: NORMAL
PLAT MORPH BLD: NORMAL
PLATELET # BLD AUTO: 56 10E3/UL (ref 150–450)
POLYCHROMASIA BLD QL SMEAR: NORMAL
POTASSIUM SERPL-SCNC: 4.2 MMOL/L (ref 3.4–5.3)
RBC # BLD AUTO: 3.27 10E6/UL (ref 3.8–5.2)
RBC AGGLUT BLD QL: NORMAL
RBC MORPH BLD: NORMAL
ROULEAUX BLD QL SMEAR: NORMAL
SICKLE CELLS BLD QL SMEAR: NORMAL
SMUDGE CELLS BLD QL SMEAR: NORMAL
SODIUM SERPL-SCNC: 140 MMOL/L (ref 135–145)
SPHEROCYTES BLD QL SMEAR: NORMAL
STOMATOCYTES BLD QL SMEAR: NORMAL
TARGETS BLD QL SMEAR: NORMAL
TOXIC GRANULES BLD QL SMEAR: NORMAL
VARIANT LYMPHS BLD QL SMEAR: NORMAL
WBC # BLD AUTO: 6.8 10E3/UL (ref 4–11)

## 2024-07-26 PROCEDURE — 80048 BASIC METABOLIC PNL TOTAL CA: CPT

## 2024-07-26 PROCEDURE — 86900 BLOOD TYPING SEROLOGIC ABO: CPT

## 2024-07-26 PROCEDURE — 36415 COLL VENOUS BLD VENIPUNCTURE: CPT

## 2024-07-26 PROCEDURE — 86850 RBC ANTIBODY SCREEN: CPT

## 2024-07-26 PROCEDURE — 85027 COMPLETE CBC AUTOMATED: CPT

## 2024-07-26 PROCEDURE — 86901 BLOOD TYPING SEROLOGIC RH(D): CPT

## 2024-07-26 PROCEDURE — 82570 ASSAY OF URINE CREATININE: CPT

## 2024-07-26 PROCEDURE — 82043 UR ALBUMIN QUANTITATIVE: CPT

## 2024-07-26 NOTE — PROGRESS NOTES
Date: 7/26/2024    Time of Call: 12:33 PM     Diagnosis:  Atrial fib     [ TORB ] Ordering provider: Randa Ann NP  Order:   CT angio Heart/[place] CT  CBC, BMP     Order received by: Adriana Jorgensen RN     Follow-up/additional notes:   Ordered for 45 day follow-up s/p Jessica.

## 2024-07-29 NOTE — TELEPHONE ENCOUNTER
[facility] Call Center    Phone Message    May a detailed message be left on voicemail: yes     Reason for Call: This patient has 3 orders to be signed, dated and faxed back to [facility]. FAX back  [phone]    Action Taken: Message routed to:  [facility]    Travel Screening: Not Applicable     Date of Service:

## 2024-07-30 ENCOUNTER — INFUSION THERAPY VISIT (OUTPATIENT)
Dept: INFUSION THERAPY | Facility: CLINIC | Age: 89
End: 2024-07-30
Attending: INTERNAL MEDICINE
Payer: COMMERCIAL

## 2024-07-30 ENCOUNTER — MEDICAL CORRESPONDENCE (OUTPATIENT)
Dept: HEALTH INFORMATION MANAGEMENT | Facility: CLINIC | Age: 89
End: 2024-07-30

## 2024-07-30 VITALS
SYSTOLIC BLOOD PRESSURE: 154 MMHG | RESPIRATION RATE: 16 BRPM | OXYGEN SATURATION: 99 % | BODY MASS INDEX: 25.01 KG/M2 | DIASTOLIC BLOOD PRESSURE: 73 MMHG | HEART RATE: 67 BPM | WEIGHT: 145.7 LBS | TEMPERATURE: 97.8 F

## 2024-07-30 DIAGNOSIS — D63.1 ANEMIA OF CHRONIC RENAL FAILURE, STAGE 4 (SEVERE) (H): Primary | ICD-10-CM

## 2024-07-30 DIAGNOSIS — Z53.9 DIAGNOSIS NOT YET DEFINED: Primary | ICD-10-CM

## 2024-07-30 DIAGNOSIS — N18.4 ANEMIA OF CHRONIC RENAL FAILURE, STAGE 4 (SEVERE) (H): Primary | ICD-10-CM

## 2024-07-30 PROCEDURE — 96365 THER/PROPH/DIAG IV INF INIT: CPT

## 2024-07-30 PROCEDURE — G0179 MD RECERTIFICATION HHA PT: HCPCS | Performed by: FAMILY MEDICINE

## 2024-07-30 PROCEDURE — 258N000003 HC RX IP 258 OP 636: Performed by: INTERNAL MEDICINE

## 2024-07-30 PROCEDURE — 99207 PR NO CHARGE LOS: CPT

## 2024-07-30 PROCEDURE — 250N000011 HC RX IP 250 OP 636: Performed by: INTERNAL MEDICINE

## 2024-07-30 RX ORDER — METHYLPREDNISOLONE SODIUM SUCCINATE 125 MG/2ML
125 INJECTION, POWDER, LYOPHILIZED, FOR SOLUTION INTRAMUSCULAR; INTRAVENOUS
Status: CANCELLED
Start: 2024-08-06

## 2024-07-30 RX ORDER — DIPHENHYDRAMINE HYDROCHLORIDE 50 MG/ML
50 INJECTION INTRAMUSCULAR; INTRAVENOUS
Status: CANCELLED
Start: 2024-08-06

## 2024-07-30 RX ORDER — HEPARIN SODIUM (PORCINE) LOCK FLUSH IV SOLN 100 UNIT/ML 100 UNIT/ML
5 SOLUTION INTRAVENOUS
Status: CANCELLED | OUTPATIENT
Start: 2024-08-06

## 2024-07-30 RX ORDER — ALBUTEROL SULFATE 90 UG/1
1-2 AEROSOL, METERED RESPIRATORY (INHALATION)
Status: CANCELLED
Start: 2024-08-06

## 2024-07-30 RX ORDER — HEPARIN SODIUM,PORCINE 10 UNIT/ML
5-20 VIAL (ML) INTRAVENOUS DAILY PRN
Status: CANCELLED | OUTPATIENT
Start: 2024-08-06

## 2024-07-30 RX ORDER — EPINEPHRINE 1 MG/ML
0.3 INJECTION, SOLUTION INTRAMUSCULAR; SUBCUTANEOUS EVERY 5 MIN PRN
Status: CANCELLED | OUTPATIENT
Start: 2024-08-06

## 2024-07-30 RX ORDER — ALBUTEROL SULFATE 0.83 MG/ML
2.5 SOLUTION RESPIRATORY (INHALATION)
Status: CANCELLED | OUTPATIENT
Start: 2024-08-06

## 2024-07-30 RX ORDER — MEPERIDINE HYDROCHLORIDE 25 MG/ML
25 INJECTION INTRAMUSCULAR; INTRAVENOUS; SUBCUTANEOUS EVERY 30 MIN PRN
Status: CANCELLED | OUTPATIENT
Start: 2024-08-06

## 2024-07-30 RX ADMIN — FERRIC CARBOXYMALTOSE INJECTION 750 MG: 50 INJECTION, SOLUTION INTRAVENOUS at 14:07

## 2024-07-30 RX ADMIN — SODIUM CHLORIDE 250 ML: 9 INJECTION, SOLUTION INTRAVENOUS at 14:04

## 2024-07-30 ASSESSMENT — PAIN SCALES - GENERAL: PAINLEVEL: NO PAIN (0)

## 2024-07-30 NOTE — PROGRESS NOTES
Infusion Nursing Note:  Tessa Mansfield presents today for Injectafer 1/2.    Patient seen by provider today: No   present during visit today: Not Applicable.    Note: This is the pts first time to [facility]. Orientation provided to infusion center, pt also instructed on how and when to use her call light. Educational handout on Injectafer printed out, reviewed with, and given to the patient. Questions answered to pt satisfaction.      Intravenous Access:  Peripheral IV placed.    Treatment Conditions:  Not Applicable.      Post Infusion Assessment:  Patient tolerated infusion without incident.  Patient observed for 30 minutes post Injectafer per protocol.  Site patent and intact, free from redness, edema or discomfort.  No evidence of extravasations.  Access discontinued per protocol.       Discharge Plan:   AVS to patient via MYCHART.  Patient will return 8/7/24 for next appointment. Future appts have been reviewed and crosschecked with appt note and plan.   Patient discharged in stable condition accompanied by: .  Departure Mode: Ambulatory with walker.      Coty Boggs RN

## 2024-07-31 ENCOUNTER — PATIENT OUTREACH (OUTPATIENT)
Dept: CARE COORDINATION | Facility: CLINIC | Age: 89
End: 2024-07-31
Payer: COMMERCIAL

## 2024-07-31 NOTE — PROGRESS NOTES
Anemia Management Note - Follow Up      SUBJECTIVE/OBJECTIVE:    Referred by Dr. Marzena Martin on 2024  Primary Diagnosis: Anemia in Chronic Kidney Disease (N18.4, D63.1)     Secondary Diagnosis: Chronic Kidney Disease, Stage 4 (N18.4)   Hgb goal range: 9-10     Epo/Darbo: TBD; treat with IV Iron per clinic note on 24.   Iron regimen: Treat with IV Iron; Oral Iron stopped  *Note form 24; Recommended to stop oral iron and start IV iron due to potential for GI bleeding and difficulty in knowing if black stools are from iron or bleeding.       Labs : 2025  RX/TX plans : TBD     Recent BREANNA use, transfusion, IV iron: Venofer and PRBCs  Hx of CAD, NTEMI (), HTN, Pacemaker, AFib, Anticoagulated.     Contact: Consent to Communicate scanned on 2019.         Latest Ref Rng & Units 2024 2024 2024 2024 7/15/2024 2024 2024   Anemia   Hemoglobin 11.7 - 15.7 g/dL 8.4     7.7     7.7  7.5     7.6  8.3  9.6  9.8  9.9     IV Iron Dose        750mg       Multiple values from one day are sorted in reverse-chronological order     BP Readings from Last 3 Encounters:   24 (!) 154/73   24 (!) 156/80   24 135/70     Wt Readings from Last 2 Encounters:   24 66.1 kg (145 lb 11.2 oz)   24 66.1 kg (145 lb 11.2 oz)           ASSESSMENT:    Hgb:at goal - continue to monitor  TSat: Due for iron studies 4 weeks after last IV iron infusion Ferritin: Due for iron studies 4 weeks after last IV iron infusion        PLAN:  2nd Injectafer scheduled for 24.     Orders needed to be renewed (for next follow-up date) in EPIC: None    Iron labs due:  4 weeks after last IV iron infusion    Plan discussed with:  No call, chart review      NEXT FOLLOW-UP DATE:  24    Barbara Dimas RN   Anemia Services  [facility]  [email or web address]   Office : [phone]  Fax: [phone]

## 2024-08-02 ENCOUNTER — TELEPHONE (OUTPATIENT)
Dept: CARDIOLOGY | Facility: CLINIC | Age: 89
End: 2024-08-02
Payer: COMMERCIAL

## 2024-08-02 NOTE — TELEPHONE ENCOUNTER
Telephone to review Eliquis (apixaban) hold last dose will be on 8/5 for upcoming Watchman procedure. Plan to review rest of medication and pre procedure instructions on Monday during H&P. All questions answered.    Masha Johnson RN, BSN  Lead Structural Heart Coordinator  [facility], [facility] and Watchman

## 2024-08-03 ENCOUNTER — HEALTH MAINTENANCE LETTER (OUTPATIENT)
Age: 89
End: 2024-08-03

## 2024-08-04 LAB
ABO/RH(D): NORMAL
ANTIBODY SCREEN: NEGATIVE
SPECIMEN EXPIRATION DATE: NORMAL

## 2024-08-04 NOTE — PROGRESS NOTES
STRUCTURAL HEART CLINIC  H&P    Referring Provider: Dr. Macias   Primary Cardiologist: Dr. Santoyo    History of Present Illness  Tessa Mansfield is a 89 year old female who presents for pre-operative H&P in preparation for left atrial appendage occluder 8/8/24 at [facility].     Ms. Mansfield has a history of paroxysmal A-fib (on Eliquis). Over the last few months she has had several hospital admissions for concern referred acute on chronic GI bleeds. Admitted 5/1 to 5/7 for melena. EGD performed at that time demonstrating possibly an AVM oozing in setting of anticoagulation. Additionally may have possible AVM of right colon vs small bowel. On 7/3 patient presented to the emergency department with concern of fatigue. Reports possible melena (taking oral iron). Hgb upon arrival 6.9. Transfusion initiated. 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 She was evaluated in structural clinic where she was deemed an appropriate candidate for LAAO. She was counseled for the above procedure.     Their remaining medical history is also notable for CAD s/p multiple stents most recent in November 2023 mLAD PCI, HFpEF, sick sinus syndrome s/p dual-chamber pacemaker,  hyperlipidemia, hypertension, CKD stage IV.     Feeling well. No recent infectious symptoms.     History of blood transfusions/reactions: Yes to RBC, no reactions  History of abnormal bleeding/anti-platelet use: Yes GI bleeding  Steroid use in the last year: No  Personal or family history with difficulty with anesthesia: No   Infection precautions: No  Difficulty w/intubation/bedrest: No     Current Medications:  Current Outpatient Medications   Medication Sig Dispense Refill    acetaminophen (TYLENOL) 325 MG tablet Take 975 mg by mouth nightly as needed for pain      allopurinol (ZYLOPRIM) 100 MG tablet Take 1  tablet (100 mg) by mouth daily 90 tablet 0    amLODIPine (NORVASC) 10 MG tablet Take 1 tablet (10 mg) by mouth daily 90 tablet 3    apixaban ANTICOAGULANT (ELIQUIS) 2.5 MG tablet Take 1 tablet (2.5 mg) by mouth 2 times daily 180 tablet 3    clopidogrel (PLAVIX) 75 MG tablet Take 1 tablet (75 mg) by mouth daily 90 tablet 1    fluticasone-salmeterol (ADVAIR) 250-50 MCG/ACT inhaler INHALE 1 DOSE BY MOUTH TWICE DAILY 60 each 8    furosemide (LASIX) 40 MG tablet Take 1 tablet (40 mg) by mouth daily May take an additional 20 mg at noon as needed for swelling. 90 tablet 2    isosorbide mononitrate (ISMO/MONOKET) 20 MG tablet Take 3 tablets (60 mg) by mouth 2 times daily 180 tablet 3    methocarbamol (ROBAXIN) 500 MG tablet Take 1 tablet (500 mg) by mouth every 6 hours as needed for muscle spasms 30 tablet 0    metoprolol tartrate (LOPRESSOR) 25 MG tablet Take 2 tablets (50 mg) by mouth 2 times daily 180 tablet 3    Multiple Vitamins-Minerals (PRESERVISION AREDS 2+MULTI VIT PO) Take 1 capsule by mouth 2 times daily      omeprazole (PRILOSEC) 40 MG DR capsule Take 1 capsule (40 mg) by mouth daily 90 capsule 3    polyethylene glycol (GOLYTELY) 236 g suspension Two days before procedure at 5PM fill first container with water. Mix and drink an 8 oz glass every 10-15 minutes until HALF of the container is gone. Place the remainder in the refrigerator. One day before procedure at 5PM drink second half of bowel prep. Drink an 8 oz glass every 10-15 minutes until it is gone. Day of procedure 6 hours before arrival time fill the 2nd container with water. Mix and drink an 8 oz glass every 10-15 minutes until HALF of the container is gone. Discard the remaining solution. (Patient not taking: Reported on 7/9/2024) 8000 mL 0    potassium chloride ER (K-TAB) 20 MEQ CR tablet Take 1 tablet (20 mEq) by mouth daily 90 tablet 3    rosuvastatin (CRESTOR) 20 MG tablet Take 1 tablet (20 mg) by mouth daily for 360 days 90 tablet 3    sodium  "bicarbonate 650 MG tablet Take 1 tablet (650 mg) by mouth 2 times daily 60 tablet 11    timolol maleate (TIMOPTIC) 0.5 % ophthalmic solution INSTILL 1 DROP INTO EACH EYE ONCE DAILY IN THE MORNING      vitamin B-12 (CYANOCOBALAMIN) 500 MCG tablet Take 1 tablet by mouth daily      vitamin D3 25 mcg (1000 units) tablet Take 1 tablet (25 mcg) by mouth every other day 90 tablet 3       Allergies:     Allergies   Allergen Reactions    Codeine Sulfate Itching    Shrimp Swelling    Oxycodone Other (See Comments)    Shrimp Extract Swelling    Bactrim [Sulfamethoxazole W/Trimethoprim]      Patient unable to recall    Biaxin [Clarithromycin]     Chlorthalidone Nausea and Vomiting    Clonidine     Darvon [Propoxyphene Hcl]     Dilaudid [Hydromorphone] Visual Disturbance and Hallucination     Tolerated in April 2024 hospital admissions    Gabapentin Fatigue and Confusion     \"felt drunk\"    Indomethacin     Levaquin [Levofloxacin Hemihydrate]     Morphine Sulfate     Percocet [Oxycodone-Acetaminophen] Hallucination    Pregabalin Itching and Fatigue    Simvastatin Palpitations     Muscle weakness, leg cramping      Spironolactone      Dehydrated      Terazosin        Past Medical History:  Past Medical History:   Diagnosis Date    CAD (coronary artery disease)     CAD s/p PCI+BMS to MRCA 10/2002, PCI to mLCX 2/2003, PCI+DESx2 to pLAD 11/2007, PCI+DESx1 to dRCA 12/2007, PCI+ALVAREZ to RPDA 7/2013; on indefinite DAPT  10/27/2002    10/27/2002: AMI s/p PCI+BMS (3.5x18mm Bx velocity) to mRCA 2/5/2003: Back pain; PCI+stent to mLCX c/b distal embolization/slow flow 4/11/2003: Unstable angina; PCI+stent (Hepacoat velocity) to mLAD 11/27/2007: PCI+DESx2 to pLAD (IVUS w/ calcification of LMCA and ulcerated plaque pLAD, 80% dRCA; also had 80% dLCX, too small to stent) 12/11/2007: Staged PCI. PCI+DESx1 (3.5x13mm Cypher) to dRCA (indefinite DAPT recommended at this time) 6/27/2008: Angina. mLCX stent 70% ISR. LAD and RCA stents patent. " Myocardium at risk from LCX felt to be small, medical management preferred to PCI. 11/10/2009: Angina. Findings unchanged from 6/27/2008, FFR LAD 0.90. Medical management recommended. 7/23/2013: Unstable angina; PCI+ALVAREZ to mPDA. Diagnostic findings: LMCA 40% distal. LAD: pLAD stents patent mLAD 30% ISR dLAD 50% diffuse, D2 diffuse disease. LCX 80% mid ISR. RCA diffuse <30% mid, RPLAs diffuse disease, RPDA 100% occlusion.      Cardiac Pacemaker- [employer]tronic, dual chamber- NOT dependant 11/28/2007    CKD (chronic kidney disease), stage IV (H)     Hyperlipidemia LDL goal <70     Hypertension     Stented coronary artery 10-    RCA    Stented coronary artery 2-5-2003    LCx    Stented coronary artery 4-    LAD    Stented coronary artery 11-    LAD    Stented coronary artery 12-    RCA    Transient complete heart block (H) 11/28/2007       Past Surgical History:  Past Surgical History:   Procedure Laterality Date    CHOLECYSTECTOMY      CV CORONARY ANGIOGRAM N/A 6/17/2022    Procedure: Coronary Angiogram;  Surgeon: Constantine Macias MD;  Location: [facility] CARDIAC CATH LAB    CV CORONARY ANGIOGRAM N/A 7/17/2023    Procedure: Coronary Angiogram;  Surgeon: Constantine Macias MD;  Location: [facility] CARDIAC CATH LAB    CV PCI N/A 6/17/2022    Procedure: Percutaneous Coronary Intervention;  Surgeon: Constantine Macias MD;  Location:  HEART CARDIAC CATH LAB    CV PCI N/A 7/17/2023    Procedure: Percutaneous Coronary Intervention;  Surgeon: Constantine Macias MD;  Location:  HEART CARDIAC CATH LAB    CV PCI N/A 11/6/2023    Procedure: Percutaneous Coronary Intervention;  Surgeon: Constantine Macias MD;  Location: [facility] CARDIAC CATH LAB    CV RIGHT HEART CATH MEASUREMENTS RECORDED N/A 6/17/2022    Procedure: Right Heart Catheterization;  Surgeon: Constantine Macias MD;  Location: [facility] CARDIAC CATH LAB    EP PACEMAKER N/A 10/15/2019    Procedure: EP PACEMAKER;   Surgeon: Anthony Zhu MD;  Location:  HEART CARDIAC CATH LAB    ESOPHAGOSCOPY, GASTROSCOPY, DUODENOSCOPY (EGD), COMBINED N/A 7/20/2023    Procedure: Esophagoscopy, gastroscopy, duodenoscopy (EGD), combined;  Surgeon: Bekah Dash DO;  [facility]    ESOPHAGOSCOPY, GASTROSCOPY, DUODENOSCOPY (EGD), COMBINED N/A 5/2/2024    Procedure: Esophagoscopy, gastroscopy, duodenoscopy (EGD), combined;  Surgeon: Tavo Donaldson MD;  [facility]    HC PPM INSERTION NEW/REPLACEMENT W/ ATRIAL&VENTRICULAR LEAD  11-    HYSTERECTOMY      LAPAROTOMY EXPLORATORY N/A 4/13/2024    Procedure: Laparotomy exploratory, Wound Vac Placement.;  Surgeon: Anthony Trammell MD;  [facility]    LAPAROTOMY EXPLORATORY N/A 4/14/2024    Procedure: Abdominal Re-Exploration and Closure;  Surgeon: Atnhony Trammell MD;  [facility]: [facility]    LAPAROTOMY EXPLORATORY N/A 4/13/2024    Procedure: Exploratory Laparotomy;  Surgeon: Anthony Trammell MD;  [facility]       Family History:  Family History   Problem Relation Age of Onset    Cancer Sister 82        bladder cancer       Social History:  Social History     Socioeconomic History    Marital status:     Number of children: 4   Occupational History     Employer: [employer]   Tobacco Use    Smoking status: Former     Current packs/day: 0.30     Average packs/day: 0.3 packs/day for 15.0 years (4.5 ttl pk-yrs)     Types: Cigarettes    Smokeless tobacco: Never    Tobacco comments:     Quit 35+ years ago   Vaping Use    Vaping status: Never Used   Substance and Sexual Activity    Drug use: No    Sexual activity: Not Currently     Partners: Male     Birth control/protection: Post-menopausal   Other Topics Concern    Blood Transfusions Yes    Exercise No     Social Determinants of Health     Financial Resource Strain: Low Risk  (11/15/2023)    Financial Resource Strain     Within the past 12 months, have you or your family members you live  with been unable to get utilities (heat, electricity) when it was really needed?: No   Food Insecurity: Low Risk  (11/15/2023)    Food Insecurity     Within the past 12 months, did you worry that your food would run out before you got money to buy more?: No     Within the past 12 months, did the food you bought just not last and you didn t have money to get more?: No   Transportation Needs: Low Risk  (11/15/2023)    Transportation Needs     Within the past 12 months, has lack of transportation kept you from medical appointments, getting your medicines, non-medical meetings or appointments, work, or from getting things that you need?: No   Interpersonal Safety: Low Risk  (11/15/2023)    Interpersonal Safety     Do you feel physically and emotionally safe where you currently live?: Yes     Within the past 12 months, have you been hit, slapped, kicked or otherwise physically hurt by someone?: No     Within the past 12 months, have you been humiliated or emotionally abused in other ways by your partner or ex-partner?: No   Housing Stability: Low Risk  (11/15/2023)    Housing Stability     Do you have housing? : Yes     Are you worried about losing your housing?: No       Review of Systems:    Functional status: Independent in ADL's. Able to achieve  METS > 4    Constitutional: No fever, chills, or sweats. No weight gain/loss   ENT: No visual disturbance, ear ache, epistaxis, sore throat  Allergies/Immunologic: Negative.   Respiratory: No cough, hemoptysia  Cardiovascular: As per HPI  GI: No nausea, vomiting, hematemesis, melena, or hematochezia  : No urinary frequency, dysuria, or hematuria  Integument: Negative  Psychiatric: Negative  Neuro: Negative  Endocrinology: Negative   Musculoskeletal: Negative      Physical Exam:  Vitals: /73 (BP Location: Right arm, Patient Position: Chair, Cuff Size: Adult Regular)   Pulse 67   Wt 67.9 kg (149 lb 11.2 oz)   SpO2 99%   BMI 25.70 kg/m     General: NAD  HEENT:   Dentition intact.    Neck: No jugular venous distension.   Heart: RRR    Lungs: CTA.    Abdomen: Soft, nontender, nondistended.   Extremities: No clubbing, cyanosis, or edema.  The pulses are +4/4 at the radial, brachial, femoral, popliteal, DP, and PT sites bilaterally.  No bruits are noted.  Neurologic: Alert and oriented to person/place/time, normal speech, gait and affect  Skin: No petechiae, purpura or rash.      Diagnostic Studies:      ECG 7/3/24:  Personally reviewed and interpreted by me.  A paced rhythm      Laboratory Studies:  Personally reviewed and interpreted by me.    LIPID RESULTS:  Lab Results   Component Value Date    CHOL 105 11/20/2023    CHOL 153 08/26/2020    HDL 44 (L) 11/20/2023    HDL 52 08/26/2020    LDL 36 11/20/2023    LDL 71 08/26/2020    TRIG 124 11/20/2023    TRIG 149 08/26/2020    CHOLHDLRATIO 2.8 12/30/2014       LIVER ENZYME RESULTS:  Lab Results   Component Value Date    AST 31 07/03/2024    AST 15 06/25/2018    ALT 16 07/03/2024    ALT 14 06/25/2018       CBC RESULTS:  Lab Results   Component Value Date    WBC 6.8 07/26/2024    WBC 5.4 08/26/2020    RBC 3.27 (L) 07/26/2024    RBC 3.58 (L) 08/26/2020    HGB 9.9 (L) 07/26/2024    HGB 11.2 (L) 08/26/2020    HCT 31.1 (L) 07/26/2024    HCT 36.0 08/26/2020    MCV 95 07/26/2024     (H) 08/26/2020    MCH 30.3 07/26/2024    MCH 31.3 08/26/2020    MCHC 31.8 07/26/2024    MCHC 31.1 (L) 08/26/2020    RDW 15.8 (H) 07/26/2024    RDW 14.9 08/26/2020    PLT 56 (L) 07/26/2024     (L) 08/26/2020       BMP RESULTS:  Lab Results   Component Value Date     07/26/2024     08/26/2020    POTASSIUM 4.2 07/26/2024    POTASSIUM 4.0 04/13/2024    POTASSIUM 4.8 11/21/2022    POTASSIUM 4.4 08/26/2020    CHLORIDE 104 07/26/2024    CHLORIDE 110 (H) 11/21/2022    CHLORIDE 111 (H) 08/26/2020    CO2 23 07/26/2024    CO2 24 11/21/2022    CO2 24 08/26/2020    ANIONGAP 13 07/26/2024    ANIONGAP 6 11/21/2022    ANIONGAP 8 08/26/2020    GLC 87  "07/26/2024     (H) 04/21/2024    GLC 79 11/21/2022    GLC 84 08/26/2020    BUN 43.6 (H) 07/26/2024    BUN 35 (H) 11/21/2022    BUN 41 (H) 08/26/2020    CR 1.68 (H) 07/26/2024    CR 1.70 (H) 08/26/2020    GFRESTIMATED 29 (L) 07/26/2024    GFRESTIMATED 27 (L) 08/26/2020    GFRESTBLACK 32 (L) 08/26/2020    ALEXANDRO 9.0 07/26/2024    ALEXANDRO 9.0 08/26/2020        A1C RESULTS:  No results found for: \"A1C\"    INR RESULTS:  Lab Results   Component Value Date    INR 1.54 (H) 07/03/2024    INR 1.49 (H) 06/28/2024    INR 1.08 12/27/2013    INR 1.15 (H) 08/06/2010       Assessment and Plan   Tessa Mansfield is a 89 year old female who presents for pre-operative H&P in preparation for left atrial appendage on 8/8/24 at [facility].     She has the following specific operative considerations:      - RCRI score 2 corresponding with a 2.4% perioperative risk of MACE      - KEVYN # of risks 2/8      - VTE risk = 2. If equal to or > 4, pharmacologic prophylaxis is indicated      - RIsk of PONV score = 2. If > 2, anti-emetic intervention recommended    Paroxysmal atrial fibrillation:   2.  Intolerance to anticoagulation due to chronic GI bleeding, likely AVMs:  Patient has a history of several hospital admissions for acute on chronic GI bleeds likely related to AVM of right colon vs small bowel. 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 She was evaluated in structural clinic where she was deemed an appropriate candidate for LAAO. She was counseled for the above procedure.     Holding Eliquis 48 hours pre procedure   All questions answered  Type and screen orders complete  Supplies for scrubbing provided  No known contrast dye allergy   No trouble with anesthesia in the past  Labs today stable , no s/s of infection    3. CAD:   4. HTN  5. HLD  CAD s/p multiple stents most recent in November " 2023 mLAD PCI.  - Denies angina, continue Plavix and high intensity statin   - Continue PTA Imdur, metoprolol, amlodipine     6. HFpEF:  7. SSS s/p PPM:  - Holding PTA lasix and potassium DOS    8. CKD stage IV:   - baseline creatinine 1.69 and GFR 29  - Minimize contrast intra-procedure     Medication Recommendations:    Please TAKE the following NEW medications:  325 mg Asprin: Please take day before and day of procedure     Please TAKE the following medications morning of procedure as prescribed:    acetaminophen (TYLENOL)     allopurinol (ZYLOPRIM)     amLODIPine (NORVASC)     clopidogrel (PLAVIX)     fluticasone-salmeterol (ADVAIR)     isosorbide mononitrate (ISMO/MONOKET)     methocarbamol (ROBAXIN)     metoprolol tartrate (LOPRESSOR)     omeprazole (PRILOSEC)     rosuvastatin (CRESTOR)     timolol maleate (TIMOPTIC) 0.5 % ophthalmic solution     Please HOLD the following medications morning of procedure:    furosemide (LASIX) 40 MG tablet    Multiple Vitamins-Minerals (PRESERVISION AREDS 2+MULTI VIT PO)    potassium chloride ER (K-TAB)     sodium bicarbonate     vitamin B-12 (CYANOCOBALAMIN)     vitamin D3 25 mcg (1000 units)      Special Medication Considerations:     Anticoagulation medication HOLD:  Eliquis (apixaban)  2 days beforehand: your last dose will be on 8/5.    Patient is optimized and is acceptable candidate for the proposed procedure.  No further diagnostic evaluation is needed. Pre-procedure instructions provided in written format.     JI Loyola, CNP  Structural Heart Care Nurse Practitioner  [facility]  Clinic: [phone]  Pager: [phone]

## 2024-08-05 ENCOUNTER — TELEPHONE (OUTPATIENT)
Dept: INTERNAL MEDICINE | Facility: CLINIC | Age: 89
End: 2024-08-05

## 2024-08-05 ENCOUNTER — LAB (OUTPATIENT)
Dept: LAB | Facility: CLINIC | Age: 89
End: 2024-08-05
Payer: COMMERCIAL

## 2024-08-05 ENCOUNTER — OFFICE VISIT (OUTPATIENT)
Dept: CARDIOLOGY | Facility: CLINIC | Age: 89
DRG: 274 | End: 2024-08-05
Attending: NURSE PRACTITIONER
Payer: COMMERCIAL

## 2024-08-05 VITALS
DIASTOLIC BLOOD PRESSURE: 73 MMHG | SYSTOLIC BLOOD PRESSURE: 131 MMHG | BODY MASS INDEX: 25.7 KG/M2 | WEIGHT: 149.7 LBS | HEART RATE: 67 BPM | OXYGEN SATURATION: 99 %

## 2024-08-05 DIAGNOSIS — I48.91 ATRIAL FIBRILLATION, UNSPECIFIED TYPE (H): ICD-10-CM

## 2024-08-05 DIAGNOSIS — I10 ESSENTIAL HYPERTENSION: ICD-10-CM

## 2024-08-05 DIAGNOSIS — I48.0 PAROXYSMAL ATRIAL FIBRILLATION (H): ICD-10-CM

## 2024-08-05 DIAGNOSIS — K92.2 GI BLEED: ICD-10-CM

## 2024-08-05 DIAGNOSIS — Z01.810 PRE-OPERATIVE CARDIOVASCULAR EXAMINATION: Primary | ICD-10-CM

## 2024-08-05 DIAGNOSIS — N18.4 CHRONIC KIDNEY DISEASE, STAGE IV (SEVERE) (H): ICD-10-CM

## 2024-08-05 DIAGNOSIS — D63.1 ANEMIA OF CHRONIC RENAL FAILURE, STAGE 4 (SEVERE) (H): ICD-10-CM

## 2024-08-05 DIAGNOSIS — N18.30 STAGE 3 CHRONIC KIDNEY DISEASE, UNSPECIFIED WHETHER STAGE 3A OR 3B CKD (H): ICD-10-CM

## 2024-08-05 DIAGNOSIS — D64.9 ANEMIA: Primary | ICD-10-CM

## 2024-08-05 DIAGNOSIS — N18.4 ANEMIA OF CHRONIC RENAL FAILURE, STAGE 4 (SEVERE) (H): ICD-10-CM

## 2024-08-05 DIAGNOSIS — D64.9 ANEMIA, UNSPECIFIED TYPE: ICD-10-CM

## 2024-08-05 LAB
ALBUMIN SERPL BCG-MCNC: 4.1 G/DL (ref 3.5–5.2)
ANION GAP SERPL CALCULATED.3IONS-SCNC: 11 MMOL/L (ref 7–15)
BASOPHILS # BLD AUTO: ABNORMAL 10*3/UL
BASOPHILS # BLD MANUAL: 0.1 10E3/UL (ref 0–0.2)
BASOPHILS NFR BLD AUTO: ABNORMAL %
BASOPHILS NFR BLD MANUAL: 1 %
BUN SERPL-MCNC: 47.8 MG/DL (ref 8–23)
CALCIUM SERPL-MCNC: 8.8 MG/DL (ref 8.8–10.4)
CHLORIDE SERPL-SCNC: 111 MMOL/L (ref 98–107)
CREAT SERPL-MCNC: 1.69 MG/DL (ref 0.51–0.95)
CREAT UR-MCNC: 46.3 MG/DL
EGFRCR SERPLBLD CKD-EPI 2021: 29 ML/MIN/1.73M2
EOSINOPHIL # BLD AUTO: ABNORMAL 10*3/UL
EOSINOPHIL # BLD MANUAL: 0.3 10E3/UL (ref 0–0.7)
EOSINOPHIL NFR BLD AUTO: ABNORMAL %
EOSINOPHIL NFR BLD MANUAL: 4 %
ERYTHROCYTE [DISTWIDTH] IN BLOOD BY AUTOMATED COUNT: 16.7 % (ref 10–15)
FERRITIN SERPL-MCNC: 994 NG/ML (ref 11–328)
GLUCOSE SERPL-MCNC: 93 MG/DL (ref 70–99)
HCO3 SERPL-SCNC: 22 MMOL/L (ref 22–29)
HCT VFR BLD AUTO: 30.1 % (ref 35–47)
HGB BLD-MCNC: 9 G/DL (ref 11.7–15.7)
IMM GRANULOCYTES # BLD: ABNORMAL 10*3/UL
IMM GRANULOCYTES NFR BLD: ABNORMAL %
INR PPP: 1.22 (ref 0.85–1.15)
IRON BINDING CAPACITY (ROCHE): 286 UG/DL (ref 240–430)
IRON SATN MFR SERPL: 21 % (ref 15–46)
IRON SERPL-MCNC: 61 UG/DL (ref 37–145)
LYMPHOCYTES # BLD AUTO: ABNORMAL 10*3/UL
LYMPHOCYTES # BLD MANUAL: 1.9 10E3/UL (ref 0.8–5.3)
LYMPHOCYTES NFR BLD AUTO: ABNORMAL %
LYMPHOCYTES NFR BLD MANUAL: 28 %
MCH RBC QN AUTO: 28.8 PG (ref 26.5–33)
MCHC RBC AUTO-ENTMCNC: 29.9 G/DL (ref 31.5–36.5)
MCV RBC AUTO: 96 FL (ref 78–100)
MICROALBUMIN UR-MCNC: 151 MG/L
MICROALBUMIN/CREAT UR: 326.13 MG/G CR (ref 0–25)
MONOCYTES # BLD AUTO: ABNORMAL 10*3/UL
MONOCYTES # BLD MANUAL: 1 10E3/UL (ref 0–1.3)
MONOCYTES NFR BLD AUTO: ABNORMAL %
MONOCYTES NFR BLD MANUAL: 14 %
NEUTROPHILS # BLD AUTO: ABNORMAL 10*3/UL
NEUTROPHILS # BLD MANUAL: 3.7 10E3/UL (ref 1.6–8.3)
NEUTROPHILS NFR BLD AUTO: ABNORMAL %
NEUTROPHILS NFR BLD MANUAL: 53 %
NRBC # BLD AUTO: 0 10E3/UL
NRBC BLD AUTO-RTO: 0 /100
PHOSPHATE SERPL-MCNC: 2.6 MG/DL (ref 2.5–4.5)
PLAT MORPH BLD: NORMAL
PLATELET # BLD AUTO: 95 10E3/UL (ref 150–450)
POTASSIUM SERPL-SCNC: 4.6 MMOL/L (ref 3.4–5.3)
RBC # BLD AUTO: 3.13 10E6/UL (ref 3.8–5.2)
RBC MORPH BLD: NORMAL
SODIUM SERPL-SCNC: 144 MMOL/L (ref 135–145)
VIT D+METAB SERPL-MCNC: 56 NG/ML (ref 20–50)
WBC # BLD AUTO: 6.9 10E3/UL (ref 4–11)

## 2024-08-05 PROCEDURE — 99000 SPECIMEN HANDLING OFFICE-LAB: CPT | Performed by: PATHOLOGY

## 2024-08-05 PROCEDURE — 80069 RENAL FUNCTION PANEL: CPT | Performed by: PATHOLOGY

## 2024-08-05 PROCEDURE — 85610 PROTHROMBIN TIME: CPT | Performed by: PATHOLOGY

## 2024-08-05 PROCEDURE — G0463 HOSPITAL OUTPT CLINIC VISIT: HCPCS | Performed by: NURSE PRACTITIONER

## 2024-08-05 PROCEDURE — 82570 ASSAY OF URINE CREATININE: CPT | Performed by: INTERNAL MEDICINE

## 2024-08-05 PROCEDURE — 86900 BLOOD TYPING SEROLOGIC ABO: CPT

## 2024-08-05 PROCEDURE — 83550 IRON BINDING TEST: CPT | Performed by: PATHOLOGY

## 2024-08-05 PROCEDURE — 36415 COLL VENOUS BLD VENIPUNCTURE: CPT | Performed by: PATHOLOGY

## 2024-08-05 PROCEDURE — 99214 OFFICE O/P EST MOD 30 MIN: CPT | Performed by: NURSE PRACTITIONER

## 2024-08-05 PROCEDURE — 85027 COMPLETE CBC AUTOMATED: CPT | Performed by: PATHOLOGY

## 2024-08-05 PROCEDURE — 82306 VITAMIN D 25 HYDROXY: CPT | Performed by: INTERNAL MEDICINE

## 2024-08-05 PROCEDURE — 82728 ASSAY OF FERRITIN: CPT | Performed by: PATHOLOGY

## 2024-08-05 PROCEDURE — 83540 ASSAY OF IRON: CPT | Performed by: PATHOLOGY

## 2024-08-05 PROCEDURE — 85007 BL SMEAR W/DIFF WBC COUNT: CPT | Performed by: PATHOLOGY

## 2024-08-05 ASSESSMENT — PAIN SCALES - GENERAL: PAINLEVEL: NO PAIN (0)

## 2024-08-05 NOTE — LETTER
8/5/2024      RE: Tessa Mansfield  [address]  [facility] [address]       Dear Colleague,    Thank you for the opportunity to participate in the care of your patient, Tessa Mansfield, at the [facility] [place] [facility]. Please see a copy of my visit note below.        STRUCTURAL HEART CLINIC  H&P    Referring Provider: Dr. Macias   Primary Cardiologist: Dr. Santoyo    History of Present Illness  Tessa Mansfield is a 89 year old female who presents for pre-operative H&P in preparation for left atrial appendage occluder 8/8/24 at [facility].     Ms. Mansfield has a history of paroxysmal A-fib (on Eliquis). Over the last few months she has had several hospital admissions for concern referred acute on chronic GI bleeds. Admitted 5/1 to 5/7 for melena. EGD performed at that time demonstrating possibly an AVM oozing in setting of anticoagulation. Additionally may have possible AVM of right colon vs small bowel. On 7/3 patient presented to the emergency department with concern of fatigue. Reports possible melena (taking oral iron). Hgb upon arrival 6.9. Transfusion initiated. 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 She was evaluated in structural clinic where she was deemed an appropriate candidate for LAAO. She was counseled for the above procedure.     Their remaining medical history is also notable for CAD s/p multiple stents most recent in November 2023 mLAD PCI, HFpEF, sick sinus syndrome s/p dual-chamber pacemaker,  hyperlipidemia, hypertension, CKD stage IV.     Feeling well. No recent infectious symptoms.     History of blood transfusions/reactions: Yes to RBC, no reactions  History of abnormal bleeding/anti-platelet use: Yes GI bleeding  Steroid use in the last year: No  Personal or  family history with difficulty with anesthesia: No   Infection precautions: No  Difficulty w/intubation/bedrest: No     Current Medications:  Current Outpatient Medications   Medication Sig Dispense Refill     acetaminophen (TYLENOL) 325 MG tablet Take 975 mg by mouth nightly as needed for pain       allopurinol (ZYLOPRIM) 100 MG tablet Take 1 tablet (100 mg) by mouth daily 90 tablet 0     amLODIPine (NORVASC) 10 MG tablet Take 1 tablet (10 mg) by mouth daily 90 tablet 3     apixaban ANTICOAGULANT (ELIQUIS) 2.5 MG tablet Take 1 tablet (2.5 mg) by mouth 2 times daily 180 tablet 3     clopidogrel (PLAVIX) 75 MG tablet Take 1 tablet (75 mg) by mouth daily 90 tablet 1     fluticasone-salmeterol (ADVAIR) 250-50 MCG/ACT inhaler INHALE 1 DOSE BY MOUTH TWICE DAILY 60 each 8     furosemide (LASIX) 40 MG tablet Take 1 tablet (40 mg) by mouth daily May take an additional 20 mg at noon as needed for swelling. 90 tablet 2     isosorbide mononitrate (ISMO/MONOKET) 20 MG tablet Take 3 tablets (60 mg) by mouth 2 times daily 180 tablet 3     methocarbamol (ROBAXIN) 500 MG tablet Take 1 tablet (500 mg) by mouth every 6 hours as needed for muscle spasms 30 tablet 0     metoprolol tartrate (LOPRESSOR) 25 MG tablet Take 2 tablets (50 mg) by mouth 2 times daily 180 tablet 3     Multiple Vitamins-Minerals (PRESERVISION AREDS 2+MULTI VIT PO) Take 1 capsule by mouth 2 times daily       omeprazole (PRILOSEC) 40 MG DR capsule Take 1 capsule (40 mg) by mouth daily 90 capsule 3     polyethylene glycol (GOLYTELY) 236 g suspension Two days before procedure at 5PM fill first container with water. Mix and drink an 8 oz glass every 10-15 minutes until HALF of the container is gone. Place the remainder in the refrigerator. One day before procedure at 5PM drink second half of bowel prep. Drink an 8 oz glass every 10-15 minutes until it is gone. Day of procedure 6 hours before arrival time fill the 2nd container with water. Mix and drink an 8 oz glass  "every 10-15 minutes until HALF of the container is gone. Discard the remaining solution. (Patient not taking: Reported on 7/9/2024) 8000 mL 0     potassium chloride ER (K-TAB) 20 MEQ CR tablet Take 1 tablet (20 mEq) by mouth daily 90 tablet 3     rosuvastatin (CRESTOR) 20 MG tablet Take 1 tablet (20 mg) by mouth daily for 360 days 90 tablet 3     sodium bicarbonate 650 MG tablet Take 1 tablet (650 mg) by mouth 2 times daily 60 tablet 11     timolol maleate (TIMOPTIC) 0.5 % ophthalmic solution INSTILL 1 DROP INTO EACH EYE ONCE DAILY IN THE MORNING       vitamin B-12 (CYANOCOBALAMIN) 500 MCG tablet Take 1 tablet by mouth daily       vitamin D3 25 mcg (1000 units) tablet Take 1 tablet (25 mcg) by mouth every other day 90 tablet 3       Allergies:     Allergies   Allergen Reactions     Codeine Sulfate Itching     Shrimp Swelling     Oxycodone Other (See Comments)     Shrimp Extract Swelling     Bactrim [Sulfamethoxazole W/Trimethoprim]      Patient unable to recall     Biaxin [Clarithromycin]      Chlorthalidone Nausea and Vomiting     Clonidine      Darvon [Propoxyphene Hcl]      Dilaudid [Hydromorphone] Visual Disturbance and Hallucination     Tolerated in April 2024 hospital admissions     Gabapentin Fatigue and Confusion     \"felt drunk\"     Indomethacin      Levaquin [Levofloxacin Hemihydrate]      Morphine Sulfate      Percocet [Oxycodone-Acetaminophen] Hallucination     Pregabalin Itching and Fatigue     Simvastatin Palpitations     Muscle weakness, leg cramping       Spironolactone      Dehydrated       Terazosin        Past Medical History:  Past Medical History:   Diagnosis Date     CAD (coronary artery disease)      [facility], PCI to mLCX 2/2003, PCI+DESx2 to pLAD 11/2007, PCI+DESx1 to dRCA 12/2007, PCI+ALVAREZ to RPDA 7/2013; on indefinite DAPT  10/27/2002    10/27/2002: AM[facility]: Back pain; [facility]/slow flow " 4/11/2003: Unstable angina; PCI+stent (Hepacoat velocity) to mLAD 11/27/2007: PCI+DESx2 to pLAD (IVUS w/ calcification of LMCA and ulcerated plaque pLAD, 80% dRCA; also had 80% dLCX, too small to stent) 12/11/2007: Staged PCI. PCI+DESx1 (3.5x13mm Cypher) to dRCA (indefinite DAPT recommended at this time) 6/27/2008: Angina. mLCX stent 70% ISR. LAD and RCA stents patent. Myocardium at risk from LCX felt to be small, medical management preferred to PCI. 11/10/2009: Angina. Findings unchanged from 6/27/2008, FFR LAD 0.90. Medical management recommended. 7/23/2013: Unstable angina; PCI+ALVAREZ to mPDA. Diagnostic findings: LMCA 40% distal. LAD: pLAD stents patent mLAD 30% ISR dLAD 50% diffuse, D2 diffuse disease. LCX 80% mid ISR. RCA diffuse <30% mid, RPLAs diffuse disease, RPDA 100% occlusion.       Cardiac Pacemaker- Medtronic, dual chamber- NOT dependant 11/28/2007     CKD (chronic kidney disease), stage IV (H)      Hyperlipidemia LDL goal <70      Hypertension      Stented coronary artery 10-    RCA     Stented coronary artery 2-5-2003    LCx     Stented coronary artery 4-    LAD     Stented coronary artery 11-    LAD     Stented coronary artery 12-    RCA     Transient complete heart block (H) 11/28/2007       Past Surgical History:  Past Surgical History:   Procedure Laterality Date     CHOLECYSTECTOMY       CV CORONARY ANGIOGRAM N/A 6/17/2022    Procedure: Coronary Angiogram;  Surgeon: Constantine Macias MD;  Location: [facility] CARDIAC CATH LAB     CV CORONARY ANGIOGRAM N/A 7/17/2023    Procedure: Coronary Angiogram;  Surgeon: Constantine Macias MD;  Location: [facility] CARDIAC CATH LAB     CV PCI N/A 6/17/2022    Procedure: Percutaneous Coronary Intervention;  Surgeon: Constantine Macias MD;  Location: [facility] CARDIAC CATH LAB     CV PCI N/A 7/17/2023    Procedure: Percutaneous Coronary Intervention;  Surgeon: Constantine Macias MD;  Location: [facility] CARDIAC CATH LAB      CV PCI N/A 11/6/2023    Procedure: Percutaneous Coronary Intervention;  Surgeon: Constantine Macias MD;  Location:  HEART CARDIAC CATH LAB     CV RIGHT HEART CATH MEASUREMENTS RECORDED N/A 6/17/2022    Procedure: Right Heart Catheterization;  Surgeon: Constantine Macias MD;  Location:  HEART CARDIAC CATH LAB     EP PACEMAKER N/A 10/15/2019    Procedure: EP PACEMAKER;  Surgeon: Anthony Zhu MD;  Location:  HEART CARDIAC CATH LAB     ESOPHAGOSCOPY, GASTROSCOPY, DUODENOSCOPY (EGD), COMBINED N/A 7/20/2023    Procedure: Esophagoscopy, gastroscopy, duodenoscopy (EGD), combined;  Surgeon: Bekah Dash DO;  [facility]     ESOPHAGOSCOPY, GASTROSCOPY, DUODENOSCOPY (EGD), COMBINED N/A 5/2/2024    Procedure: Esophagoscopy, gastroscopy, duodenoscopy (EGD), combined;  Surgeon: Tavo Donaldson MD;  [facility]     HC PPM INSERTION NEW/REPLACEMENT W/ ATRIAL&VENTRICULAR LEAD  11-     HYSTERECTOMY       LAPAROTOMY EXPLORATORY N/A 4/13/2024    Procedure: Laparotomy exploratory, Wound Vac Placement.;  Surgeon: Anthony Trammell MD;  [facility]     LAPAROTOMY EXPLORATORY N/A 4/14/2024    Procedure: Abdominal Re-Exploration and Closure;  Surgeon: Anthony Trammell MD;  [facility]     LAPAROTOMY EXPLORATORY N/A 4/13/2024    Procedure: Exploratory Laparotomy;  Surgeon: Anthony Trammell MD;  [facility]       Family History:  Family History   Problem Relation Age of Onset     Cancer Sister 82        bladder cancer       Social History:  Social History     Socioeconomic History     Marital status:      Number of children: 4   Occupational History     Employer: [employer]   Tobacco Use     Smoking status: Former     Current packs/day: 0.30     Average packs/day: 0.3 packs/day for 15.0 years (4.5 ttl pk-yrs)     Types: Cigarettes     Smokeless tobacco: Never     Tobacco comments:     Quit 35+ years ago   Vaping Use     Vaping status: Never Used    Substance and Sexual Activity     Drug use: No     Sexual activity: Not Currently     Partners: Male     Birth control/protection: Post-menopausal   Other Topics Concern     Blood Transfusions Yes     Exercise No     Social Determinants of Health     Financial Resource Strain: Low Risk  (11/15/2023)    Financial Resource Strain      Within the past 12 months, have you or your family members you live with been unable to get utilities (heat, electricity) when it was really needed?: No   Food Insecurity: Low Risk  (11/15/2023)    Food Insecurity      Within the past 12 months, did you worry that your food would run out before you got money to buy more?: No      Within the past 12 months, did the food you bought just not last and you didn t have money to get more?: No   Transportation Needs: Low Risk  (11/15/2023)    Transportation Needs      Within the past 12 months, has lack of transportation kept you from medical appointments, getting your medicines, non-medical meetings or appointments, work, or from getting things that you need?: No   Interpersonal Safety: Low Risk  (11/15/2023)    Interpersonal Safety      Do you feel physically and emotionally safe where you currently live?: Yes      Within the past 12 months, have you been hit, slapped, kicked or otherwise physically hurt by someone?: No      Within the past 12 months, have you been humiliated or emotionally abused in other ways by your partner or ex-partner?: No   Housing Stability: Low Risk  (11/15/2023)    Housing Stability      Do you have housing? : Yes      Are you worried about losing your housing?: No       Review of Systems:    Functional status: Independent in ADL's. Able to achieve  METS > 4    Constitutional: No fever, chills, or sweats. No weight gain/loss   ENT: No visual disturbance, ear ache, epistaxis, sore throat  Allergies/Immunologic: Negative.   Respiratory: No cough, hemoptysia  Cardiovascular: As per HPI  GI: No nausea, vomiting,  hematemesis, melena, or hematochezia  : No urinary frequency, dysuria, or hematuria  Integument: Negative  Psychiatric: Negative  Neuro: Negative  Endocrinology: Negative   Musculoskeletal: Negative      Physical Exam:  Vitals: /73 (BP Location: Right arm, Patient Position: Chair, Cuff Size: Adult Regular)   Pulse 67   Wt 67.9 kg (149 lb 11.2 oz)   SpO2 99%   BMI 25.70 kg/m     General: NAD  HEENT:  Dentition intact.    Neck: No jugular venous distension.   Heart: RRR    Lungs: CTA.    Abdomen: Soft, nontender, nondistended.   Extremities: No clubbing, cyanosis, or edema.  The pulses are +4/4 at the radial, brachial, femoral, popliteal, DP, and PT sites bilaterally.  No bruits are noted.  Neurologic: Alert and oriented to person/place/time, normal speech, gait and affect  Skin: No petechiae, purpura or rash.      Diagnostic Studies:      ECG 7/3/24:  Personally reviewed and interpreted by me.  A paced rhythm      Laboratory Studies:  Personally reviewed and interpreted by me.    LIPID RESULTS:  Lab Results   Component Value Date    CHOL 105 11/20/2023    CHOL 153 08/26/2020    HDL 44 (L) 11/20/2023    HDL 52 08/26/2020    LDL 36 11/20/2023    LDL 71 08/26/2020    TRIG 124 11/20/2023    TRIG 149 08/26/2020    CHOLHDLRATIO 2.8 12/30/2014       LIVER ENZYME RESULTS:  Lab Results   Component Value Date    AST 31 07/03/2024    AST 15 06/25/2018    ALT 16 07/03/2024    ALT 14 06/25/2018       CBC RESULTS:  Lab Results   Component Value Date    WBC 6.8 07/26/2024    WBC 5.4 08/26/2020    RBC 3.27 (L) 07/26/2024    RBC 3.58 (L) 08/26/2020    HGB 9.9 (L) 07/26/2024    HGB 11.2 (L) 08/26/2020    HCT 31.1 (L) 07/26/2024    HCT 36.0 08/26/2020    MCV 95 07/26/2024     (H) 08/26/2020    MCH 30.3 07/26/2024    MCH 31.3 08/26/2020    MCHC 31.8 07/26/2024    MCHC 31.1 (L) 08/26/2020    RDW 15.8 (H) 07/26/2024    RDW 14.9 08/26/2020    PLT 56 (L) 07/26/2024     (L) 08/26/2020       BMP RESULTS:  Lab Results  "  Component Value Date     07/26/2024     08/26/2020    POTASSIUM 4.2 07/26/2024    POTASSIUM 4.0 04/13/2024    POTASSIUM 4.8 11/21/2022    POTASSIUM 4.4 08/26/2020    CHLORIDE 104 07/26/2024    CHLORIDE 110 (H) 11/21/2022    CHLORIDE 111 (H) 08/26/2020    CO2 23 07/26/2024    CO2 24 11/21/2022    CO2 24 08/26/2020    ANIONGAP 13 07/26/2024    ANIONGAP 6 11/21/2022    ANIONGAP 8 08/26/2020    GLC 87 07/26/2024     (H) 04/21/2024    GLC 79 11/21/2022    GLC 84 08/26/2020    BUN 43.6 (H) 07/26/2024    BUN 35 (H) 11/21/2022    BUN 41 (H) 08/26/2020    CR 1.68 (H) 07/26/2024    CR 1.70 (H) 08/26/2020    GFRESTIMATED 29 (L) 07/26/2024    GFRESTIMATED 27 (L) 08/26/2020    GFRESTBLACK 32 (L) 08/26/2020    ALEXANDRO 9.0 07/26/2024    ALEXANDRO 9.0 08/26/2020        A1C RESULTS:  No results found for: \"A1C\"    INR RESULTS:  Lab Results   Component Value Date    INR 1.54 (H) 07/03/2024    INR 1.49 (H) 06/28/2024    INR 1.08 12/27/2013    INR 1.15 (H) 08/06/2010       Assessment and Plan   Tessa Mansfield is a 89 year old female who presents for pre-operative H&P in preparation for left atrial appendage on 8/8/24 at [facility].     She has the following specific operative considerations:      - RCRI score 2 corresponding with a 2.4% perioperative risk of MACE      - KEVYN # of risks 2/8      - VTE risk = 2. If equal to or > 4, pharmacologic prophylaxis is indicated      - RIsk of PONV score = 2. If > 2, anti-emetic intervention recommended    Paroxysmal atrial fibrillation:   2.  Intolerance to anticoagulation due to chronic GI bleeding, likely AVMs:  Patient has a history of several hospital admissions for acute on chronic GI bleeds likely related to AVM of right colon vs small bowel. 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 She was evaluated in structural clinic where she " was deemed an appropriate candidate for LAAO. She was counseled for the above procedure.     Holding Eliquis 48 hours pre procedure   All questions answered  Type and screen orders complete  Supplies for scrubbing provided  No known contrast dye allergy   No trouble with anesthesia in the past  Labs today stable , no s/s of infection    3. CAD:   4. HTN  5. HLD  CAD s/p multiple stents most recent in November 2023 mLAD PCI.  - Denies angina, continue Plavix and high intensity statin   - Continue PTA Imdur, metoprolol, amlodipine     6. HFpEF:  7. SSS s/p PPM:  - Holding PTA lasix and potassium DOS    8. CKD stage IV:   - baseline creatinine 1.69 and GFR 29  - Minimize contrast intra-procedure     Medication Recommendations:    Please TAKE the following NEW medications:  325 mg Asprin: Please take day before and day of procedure     Please TAKE the following medications morning of procedure as prescribed:     acetaminophen (TYLENOL)      allopurinol (ZYLOPRIM)      amLODIPine (NORVASC)      clopidogrel (PLAVIX)      fluticasone-salmeterol (ADVAIR)      isosorbide mononitrate (ISMO/MONOKET)      methocarbamol (ROBAXIN)      metoprolol tartrate (LOPRESSOR)      omeprazole (PRILOSEC)      rosuvastatin (CRESTOR)      timolol maleate (TIMOPTIC) 0.5 % ophthalmic solution     Please HOLD the following medications morning of procedure:     furosemide (LASIX) 40 MG tablet     Multiple Vitamins-Minerals (PRESERVISION AREDS 2+MULTI VIT PO)     potassium chloride ER (K-TAB)      sodium bicarbonate      vitamin B-12 (CYANOCOBALAMIN)      vitamin D3 25 mcg (1000 units)      Special Medication Considerations:     Anticoagulation medication HOLD:  Eliquis (apixaban)  2 days beforehand: your last dose will be on 8/5.    Patient is optimized and is acceptable candidate for the proposed procedure.  No further diagnostic evaluation is needed. Pre-procedure instructions provided in written format.     JI Loyola, CNP  Structural  Heart Care Nurse Practitioner  [facility]  Clinic: [phone]  Pager: [phone]      Please do not hesitate to contact me if you have any questions/concerns.     Sincerely,     Randa Ann NP

## 2024-08-05 NOTE — PATIENT INSTRUCTIONS
Jessica     Check in to the hospital, at 0600.       Please disregard any Mychart messages or reminders in regards to ECHO scheduling, this is done as part of procedure.   If you are unsure if your check in time has changed, please call number [phone].     3rd floor: [address],      [facility] [place]   [address] [place]  [address]        parking is available in front of the hospital, 24 hours a day  -  Stop at the Information Desk and the admissions desk in the lobby.     -------------------------------------------------------------------------  Nothing to eat after midnight  Clear liquids like water, apple juice or 7up/Sprite is OK up to two hours prior to your scheduled procedure.    You may take your medications in the morning with a sip of water.        *Please use the special cleansing wipes, received at your pre-op History and Physical, the night before and the morning of your procedure.    Please focus the use of these wipes from neck to groin, avoiding the face and genital area.     If you did NOT receive these special cleansing wipes at your pre-op History and Physical, then:   * Please use a special soap such as hibicleanse or chlorhexadine.  (This can be purchased, over the counter, at a pharmacy) If this is not available, please use an antibacterial soap.  * Take a shower, using soap, the night before the procedure, and the morning of the procedure.    Please focus the use of this soap from neck to groin, avoiding the face and genital area.     Contrast Allergy:   No     Medications Instructions:  Please TAKE the following NEW medications:  325 mg Asprin: Please take day before and day of procedure           Please TAKE the following medications morning of procedure as prescribed:    acetaminophen (TYLENOL)     allopurinol (ZYLOPRIM)     amLODIPine (NORVASC)     clopidogrel (PLAVIX)     fluticasone-salmeterol (ADVAIR)     isosorbide mononitrate  (ISMO/MONOKET)     methocarbamol (ROBAXIN)     metoprolol tartrate (LOPRESSOR)     omeprazole (PRILOSEC)     rosuvastatin (CRESTOR)     timolol maleate (TIMOPTIC) 0.5 % ophthalmic solution        Please HOLD the following medications morning of procedure:    furosemide (LASIX) 40 MG tablet    Multiple Vitamins-Minerals (PRESERVISION AREDS 2+MULTI VIT PO)    potassium chloride ER (K-TAB)     sodium bicarbonate     vitamin B-12 (CYANOCOBALAMIN)     vitamin D3 25 mcg (1000 units)         Special Medication Considerations:     Anticoagulation medication HOLD:  Eliquis (apixaban)  2 days beforehand: your last dose will be on 8/5.       If any questions please contact:  Masha Johnson RN  Structural Heart Care Coordinator  [facility]  Office: [phone]

## 2024-08-05 NOTE — NURSING NOTE
Chief Complaint   Patient presents with    Follow Up     Reason for visit: Watchman H&P     Vitals were taken and medications reconciled.    Tereso Gracia, EMT  11:51 AM

## 2024-08-05 NOTE — TELEPHONE ENCOUNTER
"Spoke with pt. She denied any sxs of GI bleeding, saying, \"I am feeling fine.\"   She has an upcoming appt on 8/14/24, so I coordinated lab scheduling on the same day.  "

## 2024-08-05 NOTE — TELEPHONE ENCOUNTER
Left a detailed message to the pt regarding the result, she should go to ED if having signs & symptoms of GI bleeding. Otherwise she can have a lab next week.    Soon-Mi  ---------------------------------------------------------------------------        ----- Message from Pedro Gomez sent at 8/5/2024  1:28 PM CDT -----  She has had recurrent GI bleeds  Her hemoglobin is down a little this week  Can you call her to ask if any GI bleed symptoms, also, let's redo cbc/diff in a week

## 2024-08-06 RX ORDER — ASPIRIN 325 MG
325 TABLET, DELAYED RELEASE (ENTERIC COATED) ORAL DAILY
Status: ON HOLD | COMMUNITY
End: 2024-08-15

## 2024-08-07 ENCOUNTER — PATIENT OUTREACH (OUTPATIENT)
Dept: CARE COORDINATION | Facility: CLINIC | Age: 89
End: 2024-08-07

## 2024-08-07 ENCOUNTER — ANESTHESIA EVENT (OUTPATIENT)
Dept: CARDIOLOGY | Facility: CLINIC | Age: 89
DRG: 274 | End: 2024-08-07
Payer: COMMERCIAL

## 2024-08-07 ENCOUNTER — INFUSION THERAPY VISIT (OUTPATIENT)
Dept: INFUSION THERAPY | Facility: CLINIC | Age: 89
DRG: 274 | End: 2024-08-07
Attending: INTERNAL MEDICINE
Payer: COMMERCIAL

## 2024-08-07 VITALS
TEMPERATURE: 97.5 F | DIASTOLIC BLOOD PRESSURE: 74 MMHG | OXYGEN SATURATION: 100 % | RESPIRATION RATE: 16 BRPM | SYSTOLIC BLOOD PRESSURE: 147 MMHG | HEART RATE: 60 BPM

## 2024-08-07 DIAGNOSIS — D63.1 ANEMIA OF CHRONIC RENAL FAILURE, STAGE 4 (SEVERE) (H): Primary | ICD-10-CM

## 2024-08-07 DIAGNOSIS — N18.4 ANEMIA OF CHRONIC RENAL FAILURE, STAGE 4 (SEVERE) (H): Primary | ICD-10-CM

## 2024-08-07 PROCEDURE — 250N000011 HC RX IP 250 OP 636: Performed by: INTERNAL MEDICINE

## 2024-08-07 PROCEDURE — 99207 PR NO CHARGE LOS: CPT

## 2024-08-07 PROCEDURE — 258N000003 HC RX IP 258 OP 636: Performed by: INTERNAL MEDICINE

## 2024-08-07 PROCEDURE — 96365 THER/PROPH/DIAG IV INF INIT: CPT

## 2024-08-07 RX ORDER — HEPARIN SODIUM,PORCINE 10 UNIT/ML
5-20 VIAL (ML) INTRAVENOUS DAILY PRN
OUTPATIENT
Start: 2024-08-13

## 2024-08-07 RX ORDER — ALBUTEROL SULFATE 0.83 MG/ML
2.5 SOLUTION RESPIRATORY (INHALATION)
OUTPATIENT
Start: 2024-08-13

## 2024-08-07 RX ORDER — DIPHENHYDRAMINE HYDROCHLORIDE 50 MG/ML
50 INJECTION INTRAMUSCULAR; INTRAVENOUS
Start: 2024-08-13

## 2024-08-07 RX ORDER — HEPARIN SODIUM (PORCINE) LOCK FLUSH IV SOLN 100 UNIT/ML 100 UNIT/ML
5 SOLUTION INTRAVENOUS
OUTPATIENT
Start: 2024-08-13

## 2024-08-07 RX ORDER — MEPERIDINE HYDROCHLORIDE 25 MG/ML
25 INJECTION INTRAMUSCULAR; INTRAVENOUS; SUBCUTANEOUS EVERY 30 MIN PRN
OUTPATIENT
Start: 2024-08-13

## 2024-08-07 RX ORDER — ALBUTEROL SULFATE 90 UG/1
1-2 AEROSOL, METERED RESPIRATORY (INHALATION)
Start: 2024-08-13

## 2024-08-07 RX ORDER — EPINEPHRINE 1 MG/ML
0.3 INJECTION, SOLUTION INTRAMUSCULAR; SUBCUTANEOUS EVERY 5 MIN PRN
OUTPATIENT
Start: 2024-08-13

## 2024-08-07 RX ORDER — METHYLPREDNISOLONE SODIUM SUCCINATE 125 MG/2ML
125 INJECTION, POWDER, LYOPHILIZED, FOR SOLUTION INTRAMUSCULAR; INTRAVENOUS
Start: 2024-08-13

## 2024-08-07 RX ADMIN — SODIUM CHLORIDE 250 ML: 9 INJECTION, SOLUTION INTRAVENOUS at 12:20

## 2024-08-07 RX ADMIN — FERRIC CARBOXYMALTOSE INJECTION 750 MG: 50 INJECTION, SOLUTION INTRAVENOUS at 12:24

## 2024-08-07 ASSESSMENT — ENCOUNTER SYMPTOMS: DYSRHYTHMIAS: 1

## 2024-08-07 NOTE — ANESTHESIA PREPROCEDURE EVALUATION
Anesthesia Pre-Procedure Evaluation    Patient: Tessa Mansfield   MRN: [id number] : 1935        Procedure : Procedure(s):  Left Atrial Appendage Closure          Past Medical History:   Diagnosis Date    CAD (coronary artery disease)     CAD s/p PCI+BMS to MRCA 10/2002, PCI to mLCX 2003, PCI+DESx2 to pLAD 2007, PCI+DESx1 to dRCA 2007, PCI+ALVAREZ to RPDA 2013; on indefinite DAPT  10/27/2002    10/27/2002: AMI s/p PCI+BMS (3.5x18mm Bx velocity) to mRCA 2003: Back pain; PCI+stent to mLCX c/b distal embolization/slow flow 2003: Unstable angina; PCI+stent (Hepacoat velocity) to mLAD 2007: PCI+DESx2 to pLAD (IVUS w/ calcification of LMCA and ulcerated plaque pLAD, 80% dRCA; also had 80% dLCX, too small to stent) 2007: Staged PCI. PCI+DESx1 (3.5x13mm Cypher) to dRCA (indefinite DAPT recommended at this time) 2008: Angina. mLCX stent 70% ISR. LAD and RCA stents patent. Myocardium at risk from LCX felt to be small, medical management preferred to PCI. 11/10/2009: Angina. Findings unchanged from 2008, FFR LAD 0.90. Medical management recommended. 2013: Unstable angina; PCI+ALVAREZ to mPDA. Diagnostic findings: LMCA 40% distal. LAD: pLAD stents patent mLAD 30% ISR dLAD 50% diffuse, D2 diffuse disease. LCX 80% mid ISR. RCA diffuse <30% mid, RPLAs diffuse disease, RPDA 100% occlusion.      Cardiac Pacemaker- Medtronic, dual chamber- NOT dependant 2007    CKD (chronic kidney disease), stage IV (H)     Hyperlipidemia LDL goal <70     Hypertension     Stented coronary artery 10-    RCA    Stented coronary artery 2003    LCx    Stented coronary artery 2003    LAD    Stented coronary artery 2007    LAD    Stented coronary artery 2007    RCA    Transient complete heart block (H) 2007      Past Surgical History:   Procedure Laterality Date    CHOLECYSTECTOMY      CV CORONARY ANGIOGRAM N/A 2022    Procedure: Coronary Angiogram;  Surgeon:  Constantine Macias MD;  Location:  HEART CARDIAC CATH LAB    CV CORONARY ANGIOGRAM N/A 7/17/2023    Procedure: Coronary Angiogram;  Surgeon: Constantine Macias MD;  Location:  [facility]ARD[facility] CATH LAB    CV PCI N/A 6/17/2022    Procedure: Percutaneous Coronary Intervention;  Surgeon: Constantine Macias MD;  Location:  [facility]ARD[facility]ATH LAB    CV PCI N/A 7/17/2023    Procedure: Percutaneous Coronary Intervention;  Surgeon: Constantine Macias MD;  Location:  [facility]ARD[facility] CATH LAB    CV PCI N/A 11/6/2023    Procedure: Percutaneous Coronary Intervention;  Surgeon: Constantine Macias MD;  Location:  HEART CARDIAC CATH LAB    CV RIGHT HEART CATH MEASUREMENTS RECORDED N/A 6/17/2022    Procedure: Right Heart Catheterization;  Surgeon: Constantine Macias MD;  Location:  HEART CARDIAC CATH LAB    EP PACEMAKER N/A 10/15/2019    Procedure: EP PACEMAKER;  Surgeon: Anthony Zhu MD;  Location: [facility] CARDIAC CATH LAB    ESOPHAGOSCOPY, GASTROSCOPY, DUODENOSCOPY (EGD), COMBINED N/A 7/20/2023    Procedure: Esophagoscopy, gastroscopy, duodenoscopy (EGD), combined;  Surgeon: Bekah Dash DO;  Location:  GI    ESOPHAGOSCOPY, GASTROSCOPY, DUODENOSCOPY (EGD), COMBINED N/A 5/2/2024    Procedure: Esophagoscopy, gastroscopy, duodenoscopy (EGD), combined;  Surgeon: Tavo Donaldson MD;  [facility]: [facility]    HC PPM INSERTION NEW/REPLACEMENT W/ ATRIAL&VENTRICULAR LEAD  11-    HYSTERECTOMY      LAPAROTOMY EXPLORATORY N/A 4/13/2024    Procedure: Laparotomy exploratory, Wound Vac Placement.;  Surgeon: Anthony Trammell MD;  [facility]: [facility]    LAPAROTOMY EXPLORATORY N/A 4/14/2024    Procedure: Abdominal Re-Exploration and Closure;  Surgeon: Anthony Trammell MD;  [facility]    LAPAROTOMY EXPLORATORY N/A 4/13/2024    Procedure: Exploratory Laparotomy;  Surgeon: Anthony Trammell MD;  [facility]: [facility]      Allergies   Allergen Reactions    Codeine Sulfate Itching     "Shrimp Swelling    Oxycodone Other (See Comments)    Shrimp Extract Swelling    Bactrim [Sulfamethoxazole W/Trimethoprim]      Patient unable to recall    Biaxin [Clarithromycin]     Chlorthalidone Nausea and Vomiting    Clonidine     Darvon [Propoxyphene Hcl]     Dilaudid [Hydromorphone] Visual Disturbance and Hallucination     Tolerated in April 2024 hospital admissions    Gabapentin Fatigue and Confusion     \"felt drunk\"    Indomethacin     Levaquin [Levofloxacin Hemihydrate]     Morphine Sulfate     Percocet [Oxycodone-Acetaminophen] Hallucination    Pregabalin Itching and Fatigue    Simvastatin Palpitations     Muscle weakness, leg cramping      Spironolactone      Dehydrated      Terazosin       Social History     Tobacco Use    Smoking status: Former     Current packs/day: 0.30     Average packs/day: 0.3 packs/day for 15.0 years (4.5 ttl pk-yrs)     Types: Cigarettes    Smokeless tobacco: Never    Tobacco comments:     Quit 35+ years ago   Substance Use Topics    Alcohol use: Not on file      Wt Readings from Last 1 Encounters:   08/08/24 66.7 kg (147 lb 0.8 oz)        Anesthesia Evaluation   Pt has had prior anesthetic. Type: General.    History of anesthetic complications   See anesthesia note 4/13/24.    ROS/MED HX  ENT/Pulmonary:     (+) sleep apnea,               tobacco use, Past use,    Moderate Persistent, asthma  Treatment: Inhaler daily,              (-) COPD   Neurologic:  - neg neurologic ROS  (-) no CVA   Cardiovascular: Comment: CAD s/p multiple stents (most recent 11/2023)  Sick sinus syndrom s/p dual PPM   Paroxysmal atrial fibrillation  Mild-moderate mitral regurgitation     Device Check (5/2024)  Device: Medtronic [id number] [place] XT DR MRI  Normal Device Function  Mode: AAIR<=>DDDR  bpm   AP: 27.5%  : 0.1%  Presenting EGM: AP-VS with bigeminal PVCs  Short V-V intervals: 0  Lead Trends Appear Stable: Yes  Estimated battery longevity to RRT = 9.1 years. Battery voltage = 3.01 V.   Atrial " Arrhythmia: 3  AT/AF episodes recorded - 28 sec-55 hrs 18 min.  AF Lengby: 6.6%  Anticoagulant: Eliquis  1 Fast A & V episode recorded - 7 sec, 162 bpm. Stored EGM reveals AF with RVR.  Ventricular Arrhythmia: 1 episode recorded as NSVT - 2 sec, 174 bpm. Stored EGM reveals 1:1 AV conduction suggestive of SVT.  PVC Singles = 246.6/hour  PVC Runs = 50.9/hour      (+) Dyslipidemia hypertension- -  CAD - past MI - stent-7/17/23. 3 Drug Eluting Stent. Taking blood thinners   CHF  Last EF: 60%           ICD Reason placed:AP: 70.5% : <0.1% Presenting EGM: NSR with PVCs @ 92 bpm Short V-V intervals: 0 Lead Trends Appear Stable: Yes Estimated battery longevity to RRT = 9.2 years. Battery voltage = 3.00 V. Atrial Arrhythmia: Total AT/AF time = 9 seconds. AF Lengby: <0.1% Anticoagulant: Eliquis Ventricular Arrhythmia: 0.  type;Medtronic [id number] [place] XT DR MRI Normal Device Function Settings AAIR<=>DDDR  bpm - Patient is dependent on ICD. dysrhythmias, a-fib and Sick Sinus Syndrome,        Previous cardiac testing   Echo: Date: 10/23 Results:  Left Ventricle  Global and regional left ventricular function is normal with an EF of 55-60%.  Left ventricular wall thickness is normal. Left ventricular size is normal.  Left ventricular diastolic function is not assessable. No regional wall motion  abnormalities are seen.     Right Ventricle  Right ventricular function, chamber size, wall motion, and thickness are  normal. A pacemaker lead is noted in the right ventricle.     Atria  The atria cannot be assessed.     Mitral Valve  Mild to moderate mitral insufficiency is present.     Aortic Valve  Aortic valve sclerosis is present.     Tricuspid Valve  Mild tricuspid insufficiency is present. The right ventricular systolic  pressure is 35mmHg above the right atrial pressure.     Pulmonic Valve  The pulmonic valve is normal. Mild pulmonic insufficiency is present.     Vessels  The thoracic aorta cannot be assessed. The pulmonary  artery cannot be  assessed. The inferior vena cava is normal.     Pericardium  No pericardial effusion is present.    Stress Test:  Date: Results:    ECG Reviewed:  Date: 7/3/24 Results:    Atrial-paced rhythm with prolonged AV conduction      Cath:  Date: 11/6/23 Results:  Left Anterior Descending  The vessel is small.  Ost LAD to Prox LAD lesion is 50% stenosed. The lesion was previously treated using a stent of unknown type.  Prox LAD to Mid LAD lesion is 40% stenosed. The lesion was previously treated using a stent of unknown type.  Mid LAD lesion is 70% stenosed.    First Diagonal Branch  The vessel is small. There is moderate diffuse disease throughout the vessel.  1st Diag lesion is 85% stenosed.    Second Diagonal Branch  2nd Diag lesion is 70% stenosed.    Left Circumflex  The vessel is small.  Prox Cx to Mid Cx lesion is 55% stenosed. The lesion was previously treated using a stent of unknown type.    First Obtuse Marginal Branch  The vessel is large.    Second Obtuse Marginal Branch  The vessel is small.    Right Coronary Artery  The vessel is large.  Previously placed Mid RCA to Dist RCA drug-eluting and unknown type of stents are widely patent. The lesion was previously treated using angioplasty.  Previously placed Dist RCA drug-eluting and unknown type of stents are widely patent. The lesion was previously treated using angioplasty.    Right Posterior Descending Artery  There is mild diffuse disease throughout the vessel.  RPDA-1 lesion is 65% stenosed.  RPDA-2 lesion is 50% stenosed. The lesion was previously treated using a stent of unknown type.    Right Posterior Atrioventricular Artery  There is mild diffuse disease throughout the vessel.      Intervention    Mid LAD lesion  Stent  The pre-interventional distal flow is normal (BREANNA 3). A STENT CORONARY ALVAREZ SYNERGY XD MR US [id number] drug eluting stent was successfully placed. Post-stent angioplasty was performed. The  post-interventional distal flow is normal (BREANNA 3).  There is a 0% residual stenosis post intervention.          METS/Exercise Tolerance:     Hematologic: Comments: Thrombocytopenia, coagulopathic    (+)      anemia, history of blood transfusion, no previous transfusion reaction,        Musculoskeletal:   (+)  arthritis,             GI/Hepatic: Comment: SBO sp ex lap x3 4/13.   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 s/p clips 7/2023  Colonic polyps  Diverticulosis       (+) GERD,                   Renal/Genitourinary:     (+) renal disease, type: CRI, Pt does not require dialysis,        (-) BPH   Endo:  - neg endo ROS     Psychiatric/Substance Use:  - neg psychiatric ROS     Infectious Disease:  - neg infectious disease ROS     Malignancy:  - neg malignancy ROS     Other:            Physical Exam    Airway        Mallampati: II   TM distance: > 3 FB   Neck ROM: full   Mouth opening: > 3 cm    Respiratory Devices and Support         Dental       (+) Minor Abnormalities - some fillings, tiny chips      Cardiovascular          Rhythm and rate: irregular and normal   (+) murmur       Pulmonary   pulmonary exam normal        breath sounds clear to auscultation           OUTSIDE LABS:  CBC:   Lab Results   Component Value Date    WBC 6.9 08/05/2024    WBC 6.8 07/26/2024    HGB 9.0 (L) 08/05/2024    HGB 9.9 (L) 07/26/2024    HCT 30.1 (L) 08/05/2024    HCT 31.1 (L) 07/26/2024    PLT 95 (L) 08/05/2024    PLT 56 (L) 07/26/2024     BMP:   Lab Results   Component Value Date     08/05/2024     07/26/2024    POTASSIUM 4.6 08/05/2024    POTASSIUM 4.2 07/26/2024    CHLORIDE 111 (H) 08/05/2024    CHLORIDE 104 07/26/2024    CO2 22 08/05/2024    CO2 23 07/26/2024    BUN 47.8 (H) 08/05/2024    BUN 43.6 (H) 07/26/2024    CR 1.69 (H) 08/05/2024    CR 1.68 (H) 07/26/2024    GLC 93 08/05/2024    GLC 87 07/26/2024     COAGS:   Lab Results   Component Value  Date    PTT 36 07/03/2024    INR 1.22 (H) 08/05/2024    FIBR 161 (L) 04/13/2024     POC:   Lab Results   Component Value Date    BGM 86 01/31/2006     HEPATIC:   Lab Results   Component Value Date    ALBUMIN 4.1 08/05/2024    PROTTOTAL 6.1 (L) 07/03/2024    ALT 16 07/03/2024    AST 31 07/03/2024    ALKPHOS 50 07/03/2024    BILITOTAL 0.3 07/03/2024     OTHER:   Lab Results   Component Value Date    PH 7.33 (L) 04/15/2024    LACT 2.6 (H) 07/03/2024    ALEXANDRO 8.8 08/05/2024    PHOS 2.6 08/05/2024    MAG 2.2 06/12/2024    LIPASE 170 (H) 07/03/2024    TSH 3.28 09/29/2021    CRP 3.5 10/01/2021    SED 37 (H) 07/31/2013       Anesthesia Plan    ASA Status:  3    NPO Status:  NPO Appropriate    Anesthesia Type: General.     - Airway: ETT   Induction: Intravenous.   Maintenance: Balanced.   Techniques and Equipment:     - Lines/Monitors: 2nd IV, BIS, Arterial Line     - Drips/Meds: Phenylephrine     Consents    Anesthesia Plan(s) and associated risks, benefits, and realistic alternatives discussed. Questions answered and patient/representative(s) expressed understanding.     - Discussed:     - Discussed with:  Patient      - Extended Intubation/Ventilatory Support Discussed: No.      - Patient is DNR/DNI Status: No     Use of blood products discussed: Yes.     - Discussed with: Patient.     - Consented: consented to blood products     Postoperative Care    Pain management: Multi-modal analgesia.   PONV prophylaxis: Ondansetron (or other 5HT-3), Dexamethasone or Solumedrol     Comments:               Dane Butler MD    I have reviewed the pertinent notes and labs in the chart from the past 30 days and (re)examined the patient.  Any updates or changes from those notes are reflected in this note.            # Drug Induced Coagulation Defect: home medication list includes an anticoagulant medication  # Drug Induced Platelet Defect: home medication list includes an antiplatelet medication

## 2024-08-07 NOTE — PROGRESS NOTES
Infusion Nursing Note:  Tessa Mansfield presents today for Injectafer 2/2.    Patient seen by provider today: No   present during visit today: Not Applicable.    Note: Patient expressed no new concerns, reports feeling less fatigued since her first dose of injectafer.    Intravenous Access:  Peripheral IV placed.    Treatment Conditions:  Not Applicable.    Post Infusion Assessment:  Patient tolerated infusion without incident.  Patient observed for 30 minutes post Injectafer per protocol.  Site patent and intact, free from redness, edema or discomfort.  No evidence of extravasations.  Access discontinued per protocol.     Discharge Plan:   Discharge instructions reviewed with: Patient.  Patient and/or family verbalized understanding of discharge instructions and all questions answered.  Patient discharged in stable condition accompanied by: .  Departure Mode: Ambulatory.  Future appts have been reviewed and crosschecked with appt note and plan.    June Cleaning RN

## 2024-08-07 NOTE — PROGRESS NOTES
Anemia Management Note - Follow Up      SUBJECTIVE/OBJECTIVE:    Referred by Dr. Marzena Martin on 2024  Primary Diagnosis: Anemia in Chronic Kidney Disease (N18.4, D63.1)     Secondary Diagnosis: Chronic Kidney Disease, Stage 4 (N18.4)   Hgb goal range: 9-10     Epo/Darbo: TBD; treat with IV Iron per clinic note on 24.   Iron regimen: Treat with IV Iron; Oral Iron stopped  *Note form 24; Recommended to stop oral iron and start IV iron due to potential for GI bleeding and difficulty in knowing if black stools are from iron or bleeding.       Labs : 2025  RX/TX plans : TBD     Recent BREANNA use, transfusion, IV iron: Venofer and PRBCs  Hx of CAD, NTEMI (), HTN, Pacemaker, AFib, Anticoagulated.     Contact: Consent to Communicate scanned on 2019.            Latest Ref Rng & Units 2024 2024 7/15/2024 2024 2024 2024 2024   Anemia   Hemoglobin 11.7 - 15.7 g/dL 8.3  9.6  9.8  9.9   9.0     IV Iron Dose      750mg  750mg   TSAT 15 - 46 %      21     Ferritin 11 - 328 ng/mL      994          BP Readings from Last 3 Encounters:   24 (!) 147/74   24 131/73   24 (!) 154/73     Wt Readings from Last 2 Encounters:   24 67.9 kg (149 lb 11.2 oz)   24 66.1 kg (145 lb 11.2 oz)           ASSESSMENT:    Hgb:at goal - continue to monitor  TSat: Due for iron studies 4 weeks after last IV iron infusion Ferritin: Due for iron studies 4 weeks after last IV iron infusion        PLAN:  Check iron studies in 4 week(s).    Orders needed to be renewed (for next follow-up date) in EPIC: None    Iron labs due:  2024    Plan discussed with:  No call, chart review       NEXT FOLLOW-UP DATE:  24    Barbara Dimas RN   Anemia Services  [facility]  [email or web address]   Office : [phone]  Fax: [phone]

## 2024-08-08 ENCOUNTER — APPOINTMENT (OUTPATIENT)
Dept: CARDIOLOGY | Facility: CLINIC | Age: 89
DRG: 274 | End: 2024-08-08
Attending: INTERNAL MEDICINE
Payer: COMMERCIAL

## 2024-08-08 ENCOUNTER — APPOINTMENT (OUTPATIENT)
Dept: CT IMAGING | Facility: CLINIC | Age: 89
DRG: 274 | End: 2024-08-08
Attending: STUDENT IN AN ORGANIZED HEALTH CARE EDUCATION/TRAINING PROGRAM
Payer: COMMERCIAL

## 2024-08-08 ENCOUNTER — APPOINTMENT (OUTPATIENT)
Dept: GENERAL RADIOLOGY | Facility: CLINIC | Age: 89
DRG: 274 | End: 2024-08-08
Attending: STUDENT IN AN ORGANIZED HEALTH CARE EDUCATION/TRAINING PROGRAM
Payer: COMMERCIAL

## 2024-08-08 ENCOUNTER — APPOINTMENT (OUTPATIENT)
Dept: CARDIOLOGY | Facility: CLINIC | Age: 89
DRG: 274 | End: 2024-08-08
Attending: NURSE PRACTITIONER
Payer: COMMERCIAL

## 2024-08-08 ENCOUNTER — HOSPITAL ENCOUNTER (OUTPATIENT)
Dept: CARDIOLOGY | Facility: CLINIC | Age: 89
Discharge: HOME | DRG: 274 | End: 2024-08-08
Attending: NURSE PRACTITIONER | Admitting: INTERNAL MEDICINE
Payer: COMMERCIAL

## 2024-08-08 ENCOUNTER — ANESTHESIA (OUTPATIENT)
Dept: CARDIOLOGY | Facility: CLINIC | Age: 89
DRG: 274 | End: 2024-08-08
Payer: COMMERCIAL

## 2024-08-08 ENCOUNTER — HOSPITAL ENCOUNTER (INPATIENT)
Facility: CLINIC | Age: 89
LOS: 7 days | Discharge: HOME HEALTH | DRG: 274 | End: 2024-08-15
Attending: INTERNAL MEDICINE | Admitting: INTERNAL MEDICINE
Payer: COMMERCIAL

## 2024-08-08 DIAGNOSIS — I21.3 ST ELEVATION MI (STEMI) (H): ICD-10-CM

## 2024-08-08 DIAGNOSIS — I25.118 CORONARY ARTERY DISEASE OF NATIVE ARTERY OF NATIVE HEART WITH STABLE ANGINA PECTORIS (H): ICD-10-CM

## 2024-08-08 DIAGNOSIS — K92.2 GASTROINTESTINAL HEMORRHAGE, UNSPECIFIED GASTROINTESTINAL HEMORRHAGE TYPE: ICD-10-CM

## 2024-08-08 DIAGNOSIS — I48.20 CHRONIC ATRIAL FIBRILLATION (H): ICD-10-CM

## 2024-08-08 DIAGNOSIS — I48.91 ATRIAL FIBRILLATION (H): ICD-10-CM

## 2024-08-08 DIAGNOSIS — I31.39 PERICARDIAL EFFUSION: Primary | ICD-10-CM

## 2024-08-08 LAB
ABO/RH(D): ABNORMAL
ACANTHOCYTES BLD QL SMEAR: ABNORMAL
ACT BLD: 140 SECONDS (ref 74–150)
ACT BLD: 295 SECONDS (ref 74–150)
ALBUMIN BODY FLUID SOURCE: NORMAL
ALBUMIN FLD-MCNC: 2.6 G/DL
ALBUMIN SERPL BCG-MCNC: 3.4 G/DL (ref 3.5–5.2)
ALP SERPL-CCNC: 107 U/L (ref 40–150)
ALT SERPL W P-5'-P-CCNC: 137 U/L (ref 0–50)
ANION GAP SERPL CALCULATED.3IONS-SCNC: 11 MMOL/L (ref 7–15)
ANION GAP SERPL CALCULATED.3IONS-SCNC: 11 MMOL/L (ref 7–15)
ANTIBODY SCREEN, TUBE: NORMAL
ANTIBODY SCREEN: POSITIVE
APPEARANCE FLD: ABNORMAL
APTT PPP: 35 SECONDS (ref 22–38)
AST SERPL W P-5'-P-CCNC: 244 U/L (ref 0–45)
ATRIAL RATE - MUSE: 62 BPM
AUER BODIES BLD QL SMEAR: ABNORMAL
BASE EXCESS BLDA CALC-SCNC: -6 MMOL/L (ref -3–3)
BASO STIPL BLD QL SMEAR: ABNORMAL
BILIRUB SERPL-MCNC: 0.7 MG/DL
BITE CELLS BLD QL SMEAR: ABNORMAL
BLD PROD TYP BPU: NORMAL
BLD PROD TYP BPU: NORMAL
BLISTER CELLS BLD QL SMEAR: ABNORMAL
BLOOD COMPONENT TYPE: NORMAL
BLOOD COMPONENT TYPE: NORMAL
BUN SERPL-MCNC: 45.9 MG/DL (ref 8–23)
BUN SERPL-MCNC: 52.2 MG/DL (ref 8–23)
BURR CELLS BLD QL SMEAR: ABNORMAL
CA-I BLD-MCNC: 4.4 MG/DL (ref 4.4–5.2)
CALCIUM SERPL-MCNC: 7.8 MG/DL (ref 8.8–10.4)
CALCIUM SERPL-MCNC: 8.8 MG/DL (ref 8.8–10.4)
CELL COUNT BODY FLUID SOURCE: ABNORMAL
CHLORIDE SERPL-SCNC: 111 MMOL/L (ref 98–107)
CHLORIDE SERPL-SCNC: 116 MMOL/L (ref 98–107)
CODING SYSTEM: NORMAL
CODING SYSTEM: NORMAL
COLOR FLD: ABNORMAL
CREAT SERPL-MCNC: 1.7 MG/DL (ref 0.51–0.95)
CREAT SERPL-MCNC: 1.71 MG/DL (ref 0.51–0.95)
CROSSMATCH: NORMAL
CROSSMATCH: NORMAL
DACRYOCYTES BLD QL SMEAR: ABNORMAL
DIASTOLIC BLOOD PRESSURE - MUSE: NORMAL MMHG
EGFRCR SERPLBLD CKD-EPI 2021: 29 ML/MIN/1.73M2
EGFRCR SERPLBLD CKD-EPI 2021: 29 ML/MIN/1.73M2
ELLIPTOCYTES BLD QL SMEAR: SLIGHT
EOSINOPHIL NFR FLD MANUAL: 3 %
ERYTHROCYTE [DISTWIDTH] IN BLOOD BY AUTOMATED COUNT: 17.2 % (ref 10–15)
FRAGMENTS BLD QL SMEAR: ABNORMAL
GLUCOSE BLD-MCNC: 74 MG/DL (ref 70–99)
GLUCOSE BLDC GLUCOMTR-MCNC: 88 MG/DL (ref 70–99)
GLUCOSE BODY FLUID SOURCE: NORMAL
GLUCOSE FLD-MCNC: 80 MG/DL
GLUCOSE SERPL-MCNC: 122 MG/DL (ref 70–99)
GLUCOSE SERPL-MCNC: 84 MG/DL (ref 70–99)
HCG INTACT+B SERPL-ACNC: 6 MIU/ML
HCO3 BLDA-SCNC: 19 MMOL/L (ref 21–28)
HCO3 SERPL-SCNC: 17 MMOL/L (ref 22–29)
HCO3 SERPL-SCNC: 22 MMOL/L (ref 22–29)
HCT VFR BLD AUTO: 29.1 % (ref 35–47)
HGB BLD-MCNC: 7.6 G/DL (ref 11.7–15.7)
HGB BLD-MCNC: 8.8 G/DL (ref 11.7–15.7)
HGB BLD-MCNC: 8.8 G/DL (ref 11.7–15.7)
HGB C CRYSTALS: ABNORMAL
HOWELL-JOLLY BOD BLD QL SMEAR: ABNORMAL
INR PPP: 1.14 (ref 0.85–1.15)
INTERPRETATION ECG - MUSE: NORMAL
ISSUE DATE AND TIME: NORMAL
ISSUE DATE AND TIME: NORMAL
LACTATE BLD-SCNC: 0.6 MMOL/L (ref 0.7–2)
LACTATE SERPL-SCNC: 1.3 MMOL/L (ref 0.7–2)
LD BODY BODY FLUID SOURCE: NORMAL
LDH FLD L TO P-CCNC: 260 U/L
LVEF ECHO: NORMAL
LVEF ECHO: NORMAL
LYMPHOCYTES NFR FLD MANUAL: 52 %
MCH RBC QN AUTO: 29.3 PG (ref 26.5–33)
MCHC RBC AUTO-ENTMCNC: 30.2 G/DL (ref 31.5–36.5)
MCV RBC AUTO: 97 FL (ref 78–100)
MONOS+MACROS NFR FLD MANUAL: 1 %
NEUTS BAND NFR FLD MANUAL: 44 %
NEUTS HYPERSEG BLD QL SMEAR: ABNORMAL
O2/TOTAL GAS SETTING VFR VENT: 40 %
OXYHGB MFR BLDA: 94 % (ref 92–100)
OXYHGB MFR BLDA: 94 % (ref 92–100)
P AXIS - MUSE: 84 DEGREES
PCO2 BLDA: 35 MM HG (ref 35–45)
PH BLDA: 7.35 [PH] (ref 7.35–7.45)
PLAT MORPH BLD: ABNORMAL
PLATELET # BLD AUTO: 99 10E3/UL (ref 150–450)
PO2 BLDA: 81 MM HG (ref 80–105)
POLYCHROMASIA BLD QL SMEAR: SLIGHT
POTASSIUM BLD-SCNC: 3.5 MMOL/L (ref 3.4–5.3)
POTASSIUM SERPL-SCNC: 4 MMOL/L (ref 3.4–5.3)
POTASSIUM SERPL-SCNC: 4.5 MMOL/L (ref 3.4–5.3)
PR INTERVAL - MUSE: 214 MS
PROT FLD-MCNC: 4.2 G/DL
PROT SERPL-MCNC: 5.6 G/DL (ref 6.4–8.3)
PROTEIN BODY FLUID SOURCE: NORMAL
QRS DURATION - MUSE: 84 MS
QT - MUSE: 396 MS
QTC - MUSE: 451 MS
R AXIS - MUSE: 12 DEGREES
RBC # BLD AUTO: 3 10E6/UL (ref 3.8–5.2)
RBC AGGLUT BLD QL: ABNORMAL
RBC MORPH BLD: ABNORMAL
ROULEAUX BLD QL SMEAR: ABNORMAL
SICKLE CELLS BLD QL SMEAR: ABNORMAL
SMUDGE CELLS BLD QL SMEAR: ABNORMAL
SODIUM BLD-SCNC: 143 MMOL/L (ref 135–145)
SODIUM SERPL-SCNC: 144 MMOL/L (ref 135–145)
SODIUM SERPL-SCNC: 144 MMOL/L (ref 135–145)
SPECIMEN EXPIRATION DATE: ABNORMAL
SPECIMEN EXPIRATION DATE: NORMAL
SPHEROCYTES BLD QL SMEAR: ABNORMAL
STOMATOCYTES BLD QL SMEAR: ABNORMAL
SYSTOLIC BLOOD PRESSURE - MUSE: NORMAL MMHG
T AXIS - MUSE: 46 DEGREES
TARGETS BLD QL SMEAR: ABNORMAL
TOXIC GRANULES BLD QL SMEAR: ABNORMAL
TROPONIN T SERPL HS-MCNC: 65 NG/L
TROPONIN T SERPL HS-MCNC: 77 NG/L
UNIT ABO/RH: NORMAL
UNIT ABO/RH: NORMAL
UNIT NUMBER: NORMAL
UNIT NUMBER: NORMAL
UNIT STATUS: NORMAL
UNIT STATUS: NORMAL
UNIT TYPE ISBT: 9500
UNIT TYPE ISBT: 9500
VARIANT LYMPHS BLD QL SMEAR: ABNORMAL
VENTRICULAR RATE- MUSE: 78 BPM
WBC # BLD AUTO: 6.4 10E3/UL (ref 4–11)
WBC # FLD AUTO: 500 /UL

## 2024-08-08 PROCEDURE — 88112 CYTOPATH CELL ENHANCE TECH: CPT | Mod: 26 | Performed by: PATHOLOGY

## 2024-08-08 PROCEDURE — 82042 OTHER SOURCE ALBUMIN QUAN EA: CPT | Performed by: INTERNAL MEDICINE

## 2024-08-08 PROCEDURE — 02L73DK OCCLUSION OF LEFT ATRIAL APPENDAGE WITH INTRALUMINAL DEVICE, PERCUTANEOUS APPROACH: ICD-10-PCS | Performed by: INTERNAL MEDICINE

## 2024-08-08 PROCEDURE — 71275 CT ANGIOGRAPHY CHEST: CPT

## 2024-08-08 PROCEDURE — 83615 LACTATE (LD) (LDH) ENZYME: CPT | Performed by: INTERNAL MEDICINE

## 2024-08-08 PROCEDURE — 89050 BODY FLUID CELL COUNT: CPT | Performed by: INTERNAL MEDICINE

## 2024-08-08 PROCEDURE — 84484 ASSAY OF TROPONIN QUANT: CPT | Performed by: STUDENT IN AN ORGANIZED HEALTH CARE EDUCATION/TRAINING PROGRAM

## 2024-08-08 PROCEDURE — 250N000009 HC RX 250: Performed by: ANESTHESIOLOGY

## 2024-08-08 PROCEDURE — 88305 TISSUE EXAM BY PATHOLOGIST: CPT | Mod: 26 | Performed by: PATHOLOGY

## 2024-08-08 PROCEDURE — 71045 X-RAY EXAM CHEST 1 VIEW: CPT | Mod: 26 | Performed by: RADIOLOGY

## 2024-08-08 PROCEDURE — 83605 ASSAY OF LACTIC ACID: CPT | Performed by: INTERNAL MEDICINE

## 2024-08-08 PROCEDURE — 99291 CRITICAL CARE FIRST HOUR: CPT | Mod: 25 | Performed by: INTERNAL MEDICINE

## 2024-08-08 PROCEDURE — 93325 DOPPLER ECHO COLOR FLOW MAPG: CPT | Mod: 26 | Performed by: STUDENT IN AN ORGANIZED HEALTH CARE EDUCATION/TRAINING PROGRAM

## 2024-08-08 PROCEDURE — 250N000013 HC RX MED GY IP 250 OP 250 PS 637: Performed by: NURSE PRACTITIONER

## 2024-08-08 PROCEDURE — 200N000002 HC R&B ICU UMMC

## 2024-08-08 PROCEDURE — 250N000013 HC RX MED GY IP 250 OP 250 PS 637

## 2024-08-08 PROCEDURE — 71275 CT ANGIOGRAPHY CHEST: CPT | Mod: 26 | Performed by: RADIOLOGY

## 2024-08-08 PROCEDURE — 93321 DOPPLER ECHO F-UP/LMTD STD: CPT | Mod: 26 | Performed by: INTERNAL MEDICINE

## 2024-08-08 PROCEDURE — 93320 DOPPLER ECHO COMPLETE: CPT | Mod: 26 | Performed by: STUDENT IN AN ORGANIZED HEALTH CARE EDUCATION/TRAINING PROGRAM

## 2024-08-08 PROCEDURE — 33340 PERQ CLSR TCAT L ATR APNDGE: CPT | Mod: GC | Performed by: INTERNAL MEDICINE

## 2024-08-08 PROCEDURE — 250N000011 HC RX IP 250 OP 636

## 2024-08-08 PROCEDURE — 93308 TTE F-UP OR LMTD: CPT | Mod: 26 | Performed by: INTERNAL MEDICINE

## 2024-08-08 PROCEDURE — 999N000208 ECHO TRANSEOPHAGEAL (TEE)

## 2024-08-08 PROCEDURE — 36415 COLL VENOUS BLD VENIPUNCTURE: CPT | Performed by: INTERNAL MEDICINE

## 2024-08-08 PROCEDURE — 85048 AUTOMATED LEUKOCYTE COUNT: CPT | Performed by: INTERNAL MEDICINE

## 2024-08-08 PROCEDURE — 93312 ECHO TRANSESOPHAGEAL: CPT | Mod: 26 | Performed by: STUDENT IN AN ORGANIZED HEALTH CARE EDUCATION/TRAINING PROGRAM

## 2024-08-08 PROCEDURE — C1769 GUIDE WIRE: HCPCS | Performed by: INTERNAL MEDICINE

## 2024-08-08 PROCEDURE — 33340 PERQ CLSR TCAT L ATR APNDGE: CPT | Performed by: ANESTHESIOLOGY

## 2024-08-08 PROCEDURE — 999N000065 XR CHEST PORT 1 VIEW

## 2024-08-08 PROCEDURE — 84311 SPECTROPHOTOMETRY: CPT | Performed by: INTERNAL MEDICINE

## 2024-08-08 PROCEDURE — 85730 THROMBOPLASTIN TIME PARTIAL: CPT | Performed by: INTERNAL MEDICINE

## 2024-08-08 PROCEDURE — 85610 PROTHROMBIN TIME: CPT | Performed by: INTERNAL MEDICINE

## 2024-08-08 PROCEDURE — 33017 PRCRD DRG 6YR+ W/O CGEN CAR: CPT | Performed by: INTERNAL MEDICINE

## 2024-08-08 PROCEDURE — 99100 ANES PT EXTEME AGE<1 YR&>70: CPT | Performed by: ANESTHESIOLOGY

## 2024-08-08 PROCEDURE — C1887 CATHETER, GUIDING: HCPCS | Performed by: INTERNAL MEDICINE

## 2024-08-08 PROCEDURE — C1760 CLOSURE DEV, VASC: HCPCS | Performed by: INTERNAL MEDICINE

## 2024-08-08 PROCEDURE — 250N000009 HC RX 250

## 2024-08-08 PROCEDURE — 86870 RBC ANTIBODY IDENTIFICATION: CPT | Performed by: INTERNAL MEDICINE

## 2024-08-08 PROCEDURE — 370N000017 HC ANESTHESIA TECHNICAL FEE, PER MIN: Performed by: INTERNAL MEDICINE

## 2024-08-08 PROCEDURE — C1726 CATH, BAL DIL, NON-VASCULAR: HCPCS | Performed by: INTERNAL MEDICINE

## 2024-08-08 PROCEDURE — 33340 PERQ CLSR TCAT L ATR APNDGE: CPT | Performed by: INTERNAL MEDICINE

## 2024-08-08 PROCEDURE — 33017 PRCRD DRG 6YR+ W/O CGEN CAR: CPT | Mod: XE | Performed by: INTERNAL MEDICINE

## 2024-08-08 PROCEDURE — 272N000001 HC OR GENERAL SUPPLY STERILE: Performed by: INTERNAL MEDICINE

## 2024-08-08 PROCEDURE — 82330 ASSAY OF CALCIUM: CPT

## 2024-08-08 PROCEDURE — 93325 DOPPLER ECHO COLOR FLOW MAPG: CPT

## 2024-08-08 PROCEDURE — 84702 CHORIONIC GONADOTROPIN TEST: CPT | Performed by: INTERNAL MEDICINE

## 2024-08-08 PROCEDURE — 82945 GLUCOSE OTHER FLUID: CPT | Performed by: INTERNAL MEDICINE

## 2024-08-08 PROCEDURE — 93325 DOPPLER ECHO COLOR FLOW MAPG: CPT | Mod: 26 | Performed by: INTERNAL MEDICINE

## 2024-08-08 PROCEDURE — 0W9D30Z DRAINAGE OF PERICARDIAL CAVITY WITH DRAINAGE DEVICE, PERCUTANEOUS APPROACH: ICD-10-PCS | Performed by: INTERNAL MEDICINE

## 2024-08-08 PROCEDURE — 80048 BASIC METABOLIC PNL TOTAL CA: CPT | Performed by: INTERNAL MEDICINE

## 2024-08-08 PROCEDURE — 85018 HEMOGLOBIN: CPT | Performed by: NURSE PRACTITIONER

## 2024-08-08 PROCEDURE — 85347 COAGULATION TIME ACTIVATED: CPT

## 2024-08-08 PROCEDURE — 93308 TTE F-UP OR LMTD: CPT

## 2024-08-08 PROCEDURE — 93005 ELECTROCARDIOGRAM TRACING: CPT

## 2024-08-08 PROCEDURE — 999N000248 HC STATISTIC IV INSERT WITH US BY RN

## 2024-08-08 PROCEDURE — 99152 MOD SED SAME PHYS/QHP 5/>YRS: CPT | Performed by: INTERNAL MEDICINE

## 2024-08-08 PROCEDURE — 250N000013 HC RX MED GY IP 250 OP 250 PS 637: Performed by: STUDENT IN AN ORGANIZED HEALTH CARE EDUCATION/TRAINING PROGRAM

## 2024-08-08 PROCEDURE — 93010 ELECTROCARDIOGRAM REPORT: CPT | Mod: XU | Performed by: INTERNAL MEDICINE

## 2024-08-08 PROCEDURE — 82810 BLOOD GASES O2 SAT ONLY: CPT

## 2024-08-08 PROCEDURE — C1894 INTRO/SHEATH, NON-LASER: HCPCS | Performed by: INTERNAL MEDICINE

## 2024-08-08 PROCEDURE — 99152 MOD SED SAME PHYS/QHP 5/>YRS: CPT | Mod: XE | Performed by: INTERNAL MEDICINE

## 2024-08-08 PROCEDURE — 255N000002 HC RX 255 OP 636: Performed by: INTERNAL MEDICINE

## 2024-08-08 PROCEDURE — 250N000011 HC RX IP 250 OP 636: Performed by: INTERNAL MEDICINE

## 2024-08-08 PROCEDURE — 86900 BLOOD TYPING SEROLOGIC ABO: CPT | Performed by: INTERNAL MEDICINE

## 2024-08-08 PROCEDURE — 250N000011 HC RX IP 250 OP 636: Performed by: ANESTHESIOLOGY

## 2024-08-08 PROCEDURE — 258N000003 HC RX IP 258 OP 636: Performed by: NURSE PRACTITIONER

## 2024-08-08 PROCEDURE — 88305 TISSUE EXAM BY PATHOLOGIST: CPT | Mod: TC | Performed by: INTERNAL MEDICINE

## 2024-08-08 PROCEDURE — 250N000011 HC RX IP 250 OP 636: Performed by: STUDENT IN AN ORGANIZED HEALTH CARE EDUCATION/TRAINING PROGRAM

## 2024-08-08 PROCEDURE — 258N000003 HC RX IP 258 OP 636

## 2024-08-08 PROCEDURE — 86922 COMPATIBILITY TEST ANTIGLOB: CPT

## 2024-08-08 PROCEDURE — 84132 ASSAY OF SERUM POTASSIUM: CPT | Performed by: INTERNAL MEDICINE

## 2024-08-08 PROCEDURE — 84157 ASSAY OF PROTEIN OTHER: CPT | Performed by: INTERNAL MEDICINE

## 2024-08-08 PROCEDURE — P9016 RBC LEUKOCYTES REDUCED: HCPCS

## 2024-08-08 PROCEDURE — C1889 IMPLANT/INSERT DEVICE, NOC: HCPCS | Performed by: INTERNAL MEDICINE

## 2024-08-08 PROCEDURE — 250N000009 HC RX 250: Performed by: INTERNAL MEDICINE

## 2024-08-08 PROCEDURE — 99153 MOD SED SAME PHYS/QHP EA: CPT | Performed by: INTERNAL MEDICINE

## 2024-08-08 PROCEDURE — 87205 SMEAR GRAM STAIN: CPT | Performed by: INTERNAL MEDICINE

## 2024-08-08 DEVICE — OCCLUDER CV WATCHMAN FLX OD31 MM M635WU50310: Type: IMPLANTABLE DEVICE | Status: FUNCTIONAL

## 2024-08-08 RX ORDER — CEFAZOLIN SODIUM/WATER 2 G/20 ML
2 SYRINGE (ML) INTRAVENOUS
Status: COMPLETED | OUTPATIENT
Start: 2024-08-08 | End: 2024-08-08

## 2024-08-08 RX ORDER — ONDANSETRON 4 MG/1
4 TABLET, ORALLY DISINTEGRATING ORAL EVERY 30 MIN PRN
Status: DISCONTINUED | OUTPATIENT
Start: 2024-08-08 | End: 2024-08-08 | Stop reason: HOSPADM

## 2024-08-08 RX ORDER — HYDROMORPHONE HYDROCHLORIDE 1 MG/ML
0.2 INJECTION, SOLUTION INTRAMUSCULAR; INTRAVENOUS; SUBCUTANEOUS EVERY 5 MIN PRN
Status: DISCONTINUED | OUTPATIENT
Start: 2024-08-08 | End: 2024-08-08 | Stop reason: HOSPADM

## 2024-08-08 RX ORDER — NALOXONE HYDROCHLORIDE 0.4 MG/ML
0.1 INJECTION, SOLUTION INTRAMUSCULAR; INTRAVENOUS; SUBCUTANEOUS
Status: DISCONTINUED | OUTPATIENT
Start: 2024-08-08 | End: 2024-08-08 | Stop reason: HOSPADM

## 2024-08-08 RX ORDER — LIDOCAINE HYDROCHLORIDE 20 MG/ML
INJECTION, SOLUTION INFILTRATION; PERINEURAL PRN
Status: DISCONTINUED | OUTPATIENT
Start: 2024-08-08 | End: 2024-08-08

## 2024-08-08 RX ORDER — ACETAMINOPHEN 325 MG/1
650 TABLET ORAL EVERY 4 HOURS PRN
Status: DISCONTINUED | OUTPATIENT
Start: 2024-08-08 | End: 2024-08-15 | Stop reason: HOSPADM

## 2024-08-08 RX ORDER — ACETAMINOPHEN 325 MG/1
975 TABLET ORAL ONCE
Status: COMPLETED | OUTPATIENT
Start: 2024-08-08 | End: 2024-08-08

## 2024-08-08 RX ORDER — SODIUM CHLORIDE, SODIUM LACTATE, POTASSIUM CHLORIDE, CALCIUM CHLORIDE 600; 310; 30; 20 MG/100ML; MG/100ML; MG/100ML; MG/100ML
INJECTION, SOLUTION INTRAVENOUS CONTINUOUS PRN
Status: DISCONTINUED | OUTPATIENT
Start: 2024-08-08 | End: 2024-08-08

## 2024-08-08 RX ORDER — ASPIRIN 81 MG/1
81 TABLET ORAL DAILY
Status: DISCONTINUED | OUTPATIENT
Start: 2024-08-09 | End: 2024-08-15 | Stop reason: HOSPADM

## 2024-08-08 RX ORDER — FENTANYL CITRATE 50 UG/ML
25 INJECTION, SOLUTION INTRAMUSCULAR; INTRAVENOUS EVERY 5 MIN PRN
Status: DISCONTINUED | OUTPATIENT
Start: 2024-08-08 | End: 2024-08-08 | Stop reason: HOSPADM

## 2024-08-08 RX ORDER — ASPIRIN 81 MG/1
81 TABLET ORAL ONCE
Status: DISCONTINUED | OUTPATIENT
Start: 2024-08-08 | End: 2024-08-08

## 2024-08-08 RX ORDER — HALOPERIDOL 5 MG/ML
1 INJECTION INTRAMUSCULAR
Status: DISCONTINUED | OUTPATIENT
Start: 2024-08-08 | End: 2024-08-08 | Stop reason: HOSPADM

## 2024-08-08 RX ORDER — ONDANSETRON 4 MG/1
4 TABLET, ORALLY DISINTEGRATING ORAL EVERY 6 HOURS PRN
Status: DISCONTINUED | OUTPATIENT
Start: 2024-08-08 | End: 2024-08-15 | Stop reason: HOSPADM

## 2024-08-08 RX ORDER — LIDOCAINE 40 MG/G
CREAM TOPICAL
Status: DISCONTINUED | OUTPATIENT
Start: 2024-08-08 | End: 2024-08-08 | Stop reason: HOSPADM

## 2024-08-08 RX ORDER — SODIUM CHLORIDE 9 MG/ML
INJECTION, SOLUTION INTRAVENOUS CONTINUOUS
Status: ACTIVE | OUTPATIENT
Start: 2024-08-08 | End: 2024-08-08

## 2024-08-08 RX ORDER — PROPOFOL 10 MG/ML
INJECTION, EMULSION INTRAVENOUS PRN
Status: DISCONTINUED | OUTPATIENT
Start: 2024-08-08 | End: 2024-08-08

## 2024-08-08 RX ORDER — FENTANYL CITRATE 50 UG/ML
50 INJECTION, SOLUTION INTRAMUSCULAR; INTRAVENOUS EVERY 5 MIN PRN
Status: DISCONTINUED | OUTPATIENT
Start: 2024-08-08 | End: 2024-08-08 | Stop reason: HOSPADM

## 2024-08-08 RX ORDER — IOPAMIDOL 755 MG/ML
INJECTION, SOLUTION INTRAVASCULAR
Status: DISCONTINUED | OUTPATIENT
Start: 2024-08-08 | End: 2024-08-08 | Stop reason: HOSPADM

## 2024-08-08 RX ORDER — FENTANYL CITRATE 50 UG/ML
INJECTION, SOLUTION INTRAMUSCULAR; INTRAVENOUS PRN
Status: DISCONTINUED | OUTPATIENT
Start: 2024-08-08 | End: 2024-08-08

## 2024-08-08 RX ORDER — LIDOCAINE HYDROCHLORIDE 10 MG/ML
INJECTION, SOLUTION INFILTRATION; PERINEURAL
Status: COMPLETED | OUTPATIENT
Start: 2024-08-08 | End: 2024-08-08

## 2024-08-08 RX ORDER — DEXAMETHASONE SODIUM PHOSPHATE 4 MG/ML
4 INJECTION, SOLUTION INTRA-ARTICULAR; INTRALESIONAL; INTRAMUSCULAR; INTRAVENOUS; SOFT TISSUE
Status: DISCONTINUED | OUTPATIENT
Start: 2024-08-08 | End: 2024-08-08 | Stop reason: HOSPADM

## 2024-08-08 RX ORDER — VASOPRESSIN IN 0.9 % NACL 2 UNIT/2ML
SYRINGE (ML) INTRAVENOUS PRN
Status: DISCONTINUED | OUTPATIENT
Start: 2024-08-08 | End: 2024-08-08

## 2024-08-08 RX ORDER — PROTAMINE SULFATE 10 MG/ML
INJECTION, SOLUTION INTRAVENOUS PRN
Status: DISCONTINUED | OUTPATIENT
Start: 2024-08-08 | End: 2024-08-08

## 2024-08-08 RX ORDER — SODIUM CHLORIDE, SODIUM LACTATE, POTASSIUM CHLORIDE, CALCIUM CHLORIDE 600; 310; 30; 20 MG/100ML; MG/100ML; MG/100ML; MG/100ML
INJECTION, SOLUTION INTRAVENOUS CONTINUOUS
Status: DISCONTINUED | OUTPATIENT
Start: 2024-08-08 | End: 2024-08-08 | Stop reason: HOSPADM

## 2024-08-08 RX ORDER — POTASSIUM CHLORIDE 750 MG/1
40 TABLET, EXTENDED RELEASE ORAL
Status: DISCONTINUED | OUTPATIENT
Start: 2024-08-08 | End: 2024-08-08 | Stop reason: HOSPADM

## 2024-08-08 RX ORDER — LORAZEPAM 0.5 MG/1
0.25 TABLET ORAL ONCE
Status: COMPLETED | OUTPATIENT
Start: 2024-08-08 | End: 2024-08-08

## 2024-08-08 RX ORDER — HYDROMORPHONE HYDROCHLORIDE 1 MG/ML
0.4 INJECTION, SOLUTION INTRAMUSCULAR; INTRAVENOUS; SUBCUTANEOUS EVERY 5 MIN PRN
Status: DISCONTINUED | OUTPATIENT
Start: 2024-08-08 | End: 2024-08-08 | Stop reason: HOSPADM

## 2024-08-08 RX ORDER — MAGNESIUM HYDROXIDE/ALUMINUM HYDROXICE/SIMETHICONE 120; 1200; 1200 MG/30ML; MG/30ML; MG/30ML
15 SUSPENSION ORAL ONCE
Status: COMPLETED | OUTPATIENT
Start: 2024-08-08 | End: 2024-08-08

## 2024-08-08 RX ORDER — ONDANSETRON 2 MG/ML
4 INJECTION INTRAMUSCULAR; INTRAVENOUS EVERY 30 MIN PRN
Status: DISCONTINUED | OUTPATIENT
Start: 2024-08-08 | End: 2024-08-08 | Stop reason: HOSPADM

## 2024-08-08 RX ORDER — ONDANSETRON 2 MG/ML
INJECTION INTRAMUSCULAR; INTRAVENOUS
Status: DISCONTINUED | OUTPATIENT
Start: 2024-08-08 | End: 2024-08-08 | Stop reason: HOSPADM

## 2024-08-08 RX ORDER — IOPAMIDOL 755 MG/ML
90 INJECTION, SOLUTION INTRAVASCULAR ONCE
Status: COMPLETED | OUTPATIENT
Start: 2024-08-08 | End: 2024-08-08

## 2024-08-08 RX ORDER — FENTANYL CITRATE 50 UG/ML
INJECTION, SOLUTION INTRAMUSCULAR; INTRAVENOUS
Status: DISCONTINUED | OUTPATIENT
Start: 2024-08-08 | End: 2024-08-08 | Stop reason: HOSPADM

## 2024-08-08 RX ORDER — ONDANSETRON 2 MG/ML
INJECTION INTRAMUSCULAR; INTRAVENOUS PRN
Status: DISCONTINUED | OUTPATIENT
Start: 2024-08-08 | End: 2024-08-08

## 2024-08-08 RX ORDER — SODIUM CHLORIDE 9 MG/ML
1000 INJECTION, SOLUTION INTRAVENOUS CONTINUOUS
Status: DISCONTINUED | OUTPATIENT
Start: 2024-08-08 | End: 2024-08-08 | Stop reason: HOSPADM

## 2024-08-08 RX ORDER — ONDANSETRON 2 MG/ML
4 INJECTION INTRAMUSCULAR; INTRAVENOUS EVERY 6 HOURS PRN
Status: DISCONTINUED | OUTPATIENT
Start: 2024-08-08 | End: 2024-08-15 | Stop reason: HOSPADM

## 2024-08-08 RX ORDER — POTASSIUM CHLORIDE 750 MG/1
20 TABLET, EXTENDED RELEASE ORAL
Status: DISCONTINUED | OUTPATIENT
Start: 2024-08-08 | End: 2024-08-08 | Stop reason: HOSPADM

## 2024-08-08 RX ADMIN — FENTANYL CITRATE 25 MCG: 50 INJECTION, SOLUTION INTRAMUSCULAR; INTRAVENOUS at 16:54

## 2024-08-08 RX ADMIN — PHENYLEPHRINE HYDROCHLORIDE 100 MCG: 10 INJECTION INTRAVENOUS at 13:35

## 2024-08-08 RX ADMIN — PROPOFOL 50 MG: 10 INJECTION, EMULSION INTRAVENOUS at 13:13

## 2024-08-08 RX ADMIN — PHENYLEPHRINE HYDROCHLORIDE 150 MCG: 10 INJECTION INTRAVENOUS at 14:13

## 2024-08-08 RX ADMIN — ACETAMINOPHEN 650 MG: 325 TABLET, FILM COATED ORAL at 23:48

## 2024-08-08 RX ADMIN — Medication 2 G: at 13:14

## 2024-08-08 RX ADMIN — HUMAN ALBUMIN MICROSPHERES AND PERFLUTREN 6 ML: 10; .22 INJECTION, SOLUTION INTRAVENOUS at 15:21

## 2024-08-08 RX ADMIN — PHENYLEPHRINE HYDROCHLORIDE 150 MCG: 10 INJECTION INTRAVENOUS at 14:14

## 2024-08-08 RX ADMIN — Medication 1 UNITS: at 14:15

## 2024-08-08 RX ADMIN — FENTANYL CITRATE 25 MCG: 50 INJECTION, SOLUTION INTRAMUSCULAR; INTRAVENOUS at 17:16

## 2024-08-08 RX ADMIN — ALUMINUM HYDROXIDE, MAGNESIUM HYDROXIDE, AND SIMETHICONE 15 ML: 1200; 120; 1200 SUSPENSION ORAL at 20:51

## 2024-08-08 RX ADMIN — IOPAMIDOL 90 ML: 755 INJECTION, SOLUTION INTRAVENOUS at 21:56

## 2024-08-08 RX ADMIN — PHENYLEPHRINE HYDROCHLORIDE 100 MCG: 10 INJECTION INTRAVENOUS at 13:23

## 2024-08-08 RX ADMIN — DEXMEDETOMIDINE HYDROCHLORIDE 4 MCG: 100 INJECTION, SOLUTION INTRAVENOUS at 14:38

## 2024-08-08 RX ADMIN — PHENYLEPHRINE HYDROCHLORIDE 0.3 MCG/KG/MIN: 10 INJECTION INTRAVENOUS at 13:19

## 2024-08-08 RX ADMIN — Medication 1 UNITS: at 14:14

## 2024-08-08 RX ADMIN — NOREPINEPHRINE BITARTRATE 12.8 MCG: 1 INJECTION, SOLUTION, CONCENTRATE INTRAVENOUS at 14:16

## 2024-08-08 RX ADMIN — LORAZEPAM 0.25 MG: 0.5 TABLET ORAL at 22:59

## 2024-08-08 RX ADMIN — FENTANYL CITRATE 50 MCG: 50 INJECTION, SOLUTION INTRAMUSCULAR; INTRAVENOUS at 17:02

## 2024-08-08 RX ADMIN — PROTAMINE SULFATE 80 MG: 10 INJECTION, SOLUTION INTRAVENOUS at 14:49

## 2024-08-08 RX ADMIN — ACETAMINOPHEN 975 MG: 325 TABLET, FILM COATED ORAL at 07:20

## 2024-08-08 RX ADMIN — SODIUM CHLORIDE, POTASSIUM CHLORIDE, SODIUM LACTATE AND CALCIUM CHLORIDE: 600; 310; 30; 20 INJECTION, SOLUTION INTRAVENOUS at 13:01

## 2024-08-08 RX ADMIN — NOREPINEPHRINE BITARTRATE 25.6 MCG: 1 INJECTION, SOLUTION, CONCENTRATE INTRAVENOUS at 14:15

## 2024-08-08 RX ADMIN — LIDOCAINE HYDROCHLORIDE 1 ML: 10 INJECTION, SOLUTION INFILTRATION; PERINEURAL at 12:15

## 2024-08-08 RX ADMIN — EPINEPHRINE 10 MCG: 1 INJECTION INTRAMUSCULAR; INTRAVENOUS; SUBCUTANEOUS at 14:17

## 2024-08-08 RX ADMIN — LIDOCAINE HYDROCHLORIDE 60 MG: 20 INJECTION, SOLUTION INFILTRATION; PERINEURAL at 13:11

## 2024-08-08 RX ADMIN — SUGAMMADEX 200 MG: 100 INJECTION, SOLUTION INTRAVENOUS at 15:08

## 2024-08-08 RX ADMIN — DEXMEDETOMIDINE HYDROCHLORIDE 4 MCG: 100 INJECTION, SOLUTION INTRAVENOUS at 14:43

## 2024-08-08 RX ADMIN — ROCURONIUM BROMIDE 50 MG: 10 INJECTION, SOLUTION INTRAVENOUS at 13:13

## 2024-08-08 RX ADMIN — PHENYLEPHRINE HYDROCHLORIDE 200 MCG: 10 INJECTION INTRAVENOUS at 14:16

## 2024-08-08 RX ADMIN — SODIUM CHLORIDE: 9 INJECTION, SOLUTION INTRAVENOUS at 16:53

## 2024-08-08 RX ADMIN — Medication 1 UNITS: at 14:13

## 2024-08-08 RX ADMIN — NICARDIPINE HYDROCHLORIDE 5 MG/HR: 0.2 INJECTION, SOLUTION INTRAVENOUS at 20:46

## 2024-08-08 RX ADMIN — ONDANSETRON 4 MG: 2 INJECTION INTRAMUSCULAR; INTRAVENOUS at 16:08

## 2024-08-08 RX ADMIN — FENTANYL CITRATE 100 MCG: 50 INJECTION INTRAMUSCULAR; INTRAVENOUS at 13:11

## 2024-08-08 RX ADMIN — ONDANSETRON 4 MG: 2 INJECTION INTRAMUSCULAR; INTRAVENOUS at 14:59

## 2024-08-08 ASSESSMENT — VISUAL ACUITY
OU: BASELINE

## 2024-08-08 ASSESSMENT — ACTIVITIES OF DAILY LIVING (ADL)
ADLS_ACUITY_SCORE: 25
ADLS_ACUITY_SCORE: 25
ADLS_ACUITY_SCORE: 26
ADLS_ACUITY_SCORE: 26
ADLS_ACUITY_SCORE: 25
ADLS_ACUITY_SCORE: 26
ADLS_ACUITY_SCORE: 25
ADLS_ACUITY_SCORE: 26
ADLS_ACUITY_SCORE: 27
ADLS_ACUITY_SCORE: 26
ADLS_ACUITY_SCORE: 25
ADLS_ACUITY_SCORE: 26
ADLS_ACUITY_SCORE: 25

## 2024-08-08 ASSESSMENT — COPD QUESTIONNAIRES: COPD: 0

## 2024-08-08 ASSESSMENT — LIFESTYLE VARIABLES: TOBACCO_USE: 1

## 2024-08-08 NOTE — OR NURSING
Device Nurse paged, per RN patient does not need to be seen in PACU as they did not manipulate pacemaker.

## 2024-08-08 NOTE — ANESTHESIA CARE TRANSFER NOTE
Patient: Tessa Mansfield    Procedure: Procedure(s):  Left Atrial Appendage Closure       Diagnosis: Gastrointestinal hemorrhage, unspecified gastrointestinal hemorrhage type [K92.2]  Atrial fibrillation (H) [I48.91]  Diagnosis Additional Information: No value filed.    Anesthesia Type:   General     Note:    Oropharynx: oropharynx clear of all foreign objects  Level of Consciousness: awake  Oxygen Supplementation: face mask  Level of Supplemental Oxygen (L/min / FiO2): 8  Independent Airway: airway patency satisfactory and stable  Dentition: dentition unchanged  Vital Signs Stable: post-procedure vital signs reviewed and stable  Report to RN Given: handoff report given  Patient transferred to: PACU    Handoff Report: Identifed the Patient, Identified the Reponsible Provider, Reviewed the pertinent medical history, Discussed the surgical course, Reviewed Intra-OP anesthesia mangement and issues during anesthesia, Set expectations for post-procedure period and Allowed opportunity for questions and acknowledgement of understanding    Vitals:  Vitals Value Taken Time   /62 08/08/24 1524   Temp     Pulse 63 08/08/24 1531   Resp 3 08/08/24 1531   SpO2 100 % 08/08/24 1531   Vitals shown include unfiled device data.    Electronically Signed By: JI Rodriguez CRNA  August 8, 2024  3:31 PM

## 2024-08-08 NOTE — Clinical Note
dry, intact, no bleeding and no hematoma. Stat-Locked in place. Tegaderm to site, 2nd tegaderm as a 2nd anchor. Banding gun used to secure drainage tube to Drain tube. Waterseal drainage unit in place

## 2024-08-08 NOTE — OR NURSING
2 units of RBCs available in blood bank.  Dr Steven Morley  notified, Cath Lab charge nurse notified.  Patient updated.

## 2024-08-08 NOTE — ANESTHESIA PROCEDURE NOTES
Airway       Patient location during procedure: OR       Procedure Start/Stop Times: 8/8/2024 1:15 PM  Staff -        Anesthesiologist:  Steven Morley MD       Resident/Fellow: Clay Jimenez MD       Other Anesthesia Staff: Elmer Khan MD       Performed By: resident  Consent for Airway        Urgency: elective  Indications and Patient Condition       Indications for airway management: caridad-procedural       Induction type:intravenous       Mask difficulty assessment: 1 - vent by mask    Final Airway Details       Final airway type: endotracheal airway       Successful airway: ETT - single  Endotracheal Airway Details        ETT size (mm): 7.0       Cuffed: yes       Successful intubation technique: direct laryngoscopy       DL Blade Type: MAC 3       Grade View of Cords: 1       Adjucts: stylet       Position: Right       Measured from: gums/teeth       Secured at (cm): 21       Bite block used: Soft    Post intubation assessment        Placement verified by: capnometry, equal breath sounds and chest rise        Number of attempts at approach: 1       Secured with: tape       Ease of procedure: easy       Dentition: Intact and Lips/oral mucosa injury    Medication(s) Administered   Medication Administration Time: 8/8/2024 1:15 PM    Additional Comments       Minor lip abrasion to Left upper lip

## 2024-08-08 NOTE — OR NURSING
Patient arrives to pacu with blood transfusing, paperwork in chart. Blood transfusion compete at 1625, no transfusion reactions observed, vitals ok per providers.

## 2024-08-08 NOTE — Clinical Note
Potential access sites were evaluated for patency using ultrasound.   The right femoral vein was selected. Access was obtained under with Sonosite and Fluoroscopic guidance using a standard 18 guage needle with direct visualization of needle entry.    The patient received a flu and boostrix vaccine. The patient tolerated them well. The boostrix was a pharmacy dispensed vaccine.

## 2024-08-08 NOTE — Clinical Note
0 : 25. 45 : 19.3. 90 : 14.5. 135 : 23. 0 : 26. 45 : 25. 135 : 17. 0 : 23.1 . 45 : 24 . 90 : 25 . 135 : 22.7 . EVA type used: BroccoliPosition: Passed. Bradford: Passed. Size: Accepted. Seal: Complete.

## 2024-08-08 NOTE — Clinical Note
Potential access sites were evaluated for patency using ultrasound.   The pericardium was selected. Access was obtained under with Sonosite guidance using a micropuncture 21 gauge needle with direct visualization of needle entry.

## 2024-08-08 NOTE — OR NURSING
Patient taken back to Cath lab for emergent procedure. Cath lab charge nurse contacted, RN says consent not needed and was notified of medications given in pacu and that patient has had ice chips. RN at bedside in pacu to explain procedure plan and to transport patient.

## 2024-08-08 NOTE — H&P
[facility]  Cardiac ICU H&P  August 8, 2024    Date of Admission:  8/8/2024  Date of Service (when I saw the patient): August 8, 2024  History is obtained from the patient and electronic health record         H&P     Tessa Mansfield is a 89 year old female who presents to the CICU following a Watchman device placement. 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     Patient underwent successful left atrial appendage closure with a 31mm WATCHMAN FLX device on August 8, 2024. Her intra procedural MICHELL demonstrated a new pericardial effusion without tamponade physiology. There was no contrast extravasation to the pericardial space following device deployment. Given 1 pRBC during the case. Right femoral venotomy closed with suture closure device. Patient seen in the PACU post-procedure. Alert and oriented without cardiovascular complaints. Right femoral puncture site flat and dry without hematoma. Vital signs stable. Patient to be admitted to CICU for post procedural monitoring in the setting of new pericardial effusion. Patient confirmed FULL CODE on admission.         Assessment & Plan        Tessa Mansfield is a 89 year old female who presents to the CICU following a Watchman device placement. 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     Neuro:  #pain  - Currently controlled  Plan:  - Tylenol PRN, Consider Norco if additional pain control needed    CV:  #pAF  #intolerance to AC due to GIB  #pericardial effusion  - Patient with a CHADsVASC 5 and HASBLED of 5. She has had difficulty tolerating eliquis in the past due to recurrent GIB in the setting of AVMs  - s/p 31mm WATCHMAN FLX device placement on August 8,  2024. Procedure complicated by new pericardial effusion without tamponade. There was no contrast extravasation noted. Patient admitted for post procedural monitoring  Plan:  - ASA and Plavix 75 mg daily to start tomorrow provided no bleeding  - Hold PTA metoprolol tartrate 50 gm BID  - Tele  - No AC  - TTE at 5PM  - Discontinue eliquis, no longer needed on discharge  - Repeat hemoglobin, include CMP and LA  - Continue art line monitoring through to tomorrow.   - SBE prophylaxis x 6 months post LAAO   - Follow-up structural NAPOLEON 1 week and 45 days post procedure  - 45 days following Watchman device implantation, patient will return for MICHELL to evaluate endothelialization of the device. If adequate seal is assessed (<5mm), dual antiplatelet therapy with clopidogrel and aspirin will be continued for total of 6 months from implantation date, with aspirin then continuing lifelong.      #CAD s/p multiple PCIs  #HLD  - Extensive CAD history with multiple PCIs in the past. Most recently patient had a PCI to the mLAD on 11/26/23.   - Currently without anginal symptoms  Plan:  - ASA to restart tomorrow as above  - Tele  - Continue PTA rosuvastatin 20 mg daily  - Hold PTA metoprolol tartrate 50 gm BID, isosorbide mononitrate 60 mg BID    #HTN  #HFpEF  - Holding PTA BB, amlodipine 10 mg daily, isosorbide mononitrate 60 mg BID    #SSS s/p dual chamber PPM:  - Last device check 5/28/24 with 6.6% AF burden. AP 27.5%,  0.1%  Plan:  - OP EP follow up    Pulm:  No active issues    GI/Nutrition:  #nutrition  - ADAT    Renal:  #CKD IV  - Baseline Cr 1.7-2.0. Cr 1.71 on admission  Plan:  - Avoid nephrotoxic meds  - Renally dose meds      ID:  No active issues    Heme:  #acute blood loss anemia  #anemia of chronic disease  #history of GIB 2/2 AVMs  - Baseline Hgb ~7.5-9.0. Presented with Hgb 8.8, given 1 U pRBC during the case  - Off AC due to GIB in addition to concern for pericardial effusion  - Presently without overt blood  losses  Plan:  - Repeat hemoglobin around 6 PM  - Trend Hgb if decreasing    Endo:  #at risk for hyperglycemia  - No DM history  Plan:  - sliding scale insulin if necessary    The pt was discussed and evaluated with Dr. Brendon MD, attending physician, who agrees with the assessment and plan above.     Kenneth Friedman, PGY-6  Cardiovascular Disease Fellow    August 8, 2024         Medical History:    Past Medical History    I have reviewed this patient's medical history and updated it with pertinent information if needed.   Past Medical History:   Diagnosis Date    CAD (coronary artery disease)     CAD s/p PCI+BMS to MRCA 10/2002, PCI to mLCX 2/2003, PCI+DESx2 to pLAD 11/2007, PCI+DESx1 to dRCA 12/2007, PCI+ALVAREZ to RPDA 7/2013; on indefinite DAPT  10/27/2002    10/27/2002: AMI s/p PCI+BMS (3.5x18mm Bx velocity) to mRCA 2/5/2003: Back pain; PCI+stent to mLCX c/b distal embolization/slow flow 4/11/2003: Unstable angina; PCI+stent (Hepacoat velocity) to mLAD 11/27/2007: PCI+DESx2 to pLAD (IVUS w/ calcification of LMCA and ulcerated plaque pLAD, 80% dRCA; also had 80% dLCX, too small to stent) 12/11/2007: Staged PCI. PCI+DESx1 (3.5x13mm Cypher) to dRCA (indefinite DAPT recommended at this time) 6/27/2008: Angina. mLCX stent 70% ISR. LAD and RCA stents patent. Myocardium at risk from LCX felt to be small, medical management preferred to PCI. 11/10/2009: Angina. Findings unchanged from 6/27/2008, FFR LAD 0.90. Medical management recommended. 7/23/2013: Unstable angina; PCI+ALVAREZ to mPDA. Diagnostic findings: LMCA 40% distal. LAD: pLAD stents patent mLAD 30% ISR dLAD 50% diffuse, D2 diffuse disease. LCX 80% mid ISR. RCA diffuse <30% mid, RPLAs diffuse disease, RPDA 100% occlusion.      Cardiac Pacemaker- Medtronic, dual chamber- NOT dependant 11/28/2007    CKD (chronic kidney disease), stage IV (H)     Hyperlipidemia LDL goal <70     Hypertension     Stented coronary artery 10-    RCA    Stented coronary artery 2-5-2003     LCx    Stented coronary artery 4-    LAD    Stented coronary artery 11-    LAD    Stented coronary artery 12-    RCA    Transient complete heart block (H) 11/28/2007       Past Surgical History   I have reviewed this patient's surgical history and updated it with pertinent information if needed.  Past Surgical History:   Procedure Laterality Date    CHOLECYSTECTOMY      CV CORONARY ANGIOGRAM N/A 6/17/2022    Procedure: Coronary Angiogram;  Surgeon: Constantine Macias MD;  Location: [facility] CARDIAC CATH LAB    CV CORONARY ANGIOGRAM N/A 7/17/2023    Procedure: Coronary Angiogram;  Surgeon: Constantine Macias MD;  Location: [facility] CARDIAC CATH LAB    CV PCI N/A 6/17/2022    Procedure: Percutaneous Coronary Intervention;  Surgeon: Constantine Macias MD;  Location: [facility] CARDIAC CATH LAB    CV PCI N/A 7/17/2023    Procedure: Percutaneous Coronary Intervention;  Surgeon: Constantine Macias MD;  Location: [facility] CARDIAC CATH LAB    CV PCI N/A 11/6/2023    Procedure: Percutaneous Coronary Intervention;  Surgeon: Constantine Macias MD;  Location: [facility] CARDIAC CATH LAB    CV RIGHT HEART CATH MEASUREMENTS RECORDED N/A 6/17/2022    Procedure: Right Heart Catheterization;  Surgeon: Constantine Macias MD;  Location: [facility] CARDIAC CATH LAB    EP PACEMAKER N/A 10/15/2019    Procedure: EP PACEMAKER;  Surgeon: Anthony Zhu MD;  Location: [facility] CARDIAC CATH LAB    ESOPHAGOSCOPY, GASTROSCOPY, DUODENOSCOPY (EGD), COMBINED N/A 7/20/2023    Procedure: Esophagoscopy, gastroscopy, duodenoscopy (EGD), combined;  Surgeon: Bekah Dash DO;  Location:  GI    ESOPHAGOSCOPY, GASTROSCOPY, DUODENOSCOPY (EGD), COMBINED N/A 5/2/2024    Procedure: Esophagoscopy, gastroscopy, duodenoscopy (EGD), combined;  Surgeon: Tavo Donaldson MD;  Location: [facility]    HC PPM INSERTION NEW/REPLACEMENT W/ ATRIAL&VENTRICULAR LEAD  11-    HYSTERECTOMY      LAPAROTOMY  EXPLORATORY N/A 4/13/2024    Procedure: Laparotomy exploratory, Wound Vac Placement.;  Surgeon: Anthony Trammell MD;  Location: [facility] OR    LAPAROTOMY EXPLORATORY N/A 4/14/2024    Procedure: Abdominal Re-Exploration and Closure;  Surgeon: Anthony Trammell MD;  Location: [facility] OR    LAPAROTOMY EXPLORATORY N/A 4/13/2024    Procedure: Exploratory Laparotomy;  Surgeon: Anthony Trammell MD;  Location: [facility] OR       Prior to Admission Medications   Prior to Admission Medications   Prescriptions Last Dose Informant Patient Reported? Taking?   Multiple Vitamins-Minerals (PRESERVISION AREDS 2+MULTI VIT PO) 8/8/2024 at 0500 Self Yes Yes   Sig: Take 1 capsule by mouth 2 times daily   acetaminophen (TYLENOL) 325 MG tablet 8/8/2024 at 0445 Self Yes Yes   Sig: Take 975 mg by mouth nightly as needed for pain   allopurinol (ZYLOPRIM) 100 MG tablet 8/8/2024 at 0445 Self No Yes   Sig: Take 1 tablet (100 mg) by mouth daily   amLODIPine (NORVASC) 10 MG tablet 8/8/2024 at 0445 Self No Yes   Sig: Take 1 tablet (10 mg) by mouth daily   apixaban ANTICOAGULANT (ELIQUIS) 2.5 MG tablet 8/4/2024 Self No No   Sig: Take 1 tablet (2.5 mg) by mouth 2 times daily   Patient not taking: Reported on 8/5/2024   aspirin (ASA) 325 MG EC tablet 8/8/2024 at 0500  Yes Yes   Sig: Take 325 mg by mouth daily   clopidogrel (PLAVIX) 75 MG tablet 8/8/2024 at 0500  No Yes   Sig: Take 1 tablet (75 mg) by mouth daily   fluticasone-salmeterol (ADVAIR) 250-50 MCG/ACT inhaler More than a month Self No Yes   Sig: INHALE 1 DOSE BY MOUTH TWICE DAILY   furosemide (LASIX) 40 MG tablet 8/7/2024 Self No Yes   Sig: Take 1 tablet (40 mg) by mouth daily May take an additional 20 mg at noon as needed for swelling.   isosorbide mononitrate (ISMO/MONOKET) 20 MG tablet 8/8/2024 at 0500 Self No Yes   Sig: Take 3 tablets (60 mg) by mouth 2 times daily   methocarbamol (ROBAXIN) 500 MG tablet 8/8/2024 at 0500 Self No Yes   Sig: Take 1 tablet (500 mg) by mouth every 6 hours as  needed for muscle spasms   metoprolol tartrate (LOPRESSOR) 25 MG tablet 8/8/2024 at 0500  No Yes   Sig: Take 2 tablets (50 mg) by mouth 2 times daily   omeprazole (PRILOSEC) 40 MG DR capsule 8/8/2024 at 0500 Self No Yes   Sig: Take 1 capsule (40 mg) by mouth daily   polyethylene glycol (GOLYTELY) 236 g suspension More than a month  No Yes   Sig: Two days before procedure at 5PM fill first container with water. Mix and drink an 8 oz glass every 10-15 minutes until HALF of the container is gone. Place the remainder in the refrigerator. One day before procedure at 5PM drink second half of bowel prep. Drink an 8 oz glass every 10-15 minutes until it is gone. Day of procedure 6 hours before arrival time fill the 2nd container with water. Mix and drink an 8 oz glass every 10-15 minutes until HALF of the container is gone. Discard the remaining solution.   potassium chloride ER (K-TAB) 20 MEQ CR tablet 8/7/2024 Self No Yes   Sig: Take 1 tablet (20 mEq) by mouth daily   rosuvastatin (CRESTOR) 20 MG tablet  Self No Yes   Sig: Take 1 tablet (20 mg) by mouth daily for 360 days   sodium bicarbonate 650 MG tablet 8/7/2024  No Yes   Sig: Take 1 tablet (650 mg) by mouth 2 times daily   timolol maleate (TIMOPTIC) 0.5 % ophthalmic solution 8/8/2024 Self Yes Yes   Sig: INSTILL 1 DROP INTO EACH EYE ONCE DAILY IN THE MORNING   vitamin B-12 (CYANOCOBALAMIN) 500 MCG tablet 8/7/2024 Self Yes Yes   Sig: Take 1 tablet by mouth daily   vitamin D3 25 mcg (1000 units) tablet 8/7/2024 Self No Yes   Sig: Take 1 tablet (25 mcg) by mouth every other day      Facility-Administered Medications: None     Allergies   Allergies   Allergen Reactions    Blood Transfusion Related (Informational Only) Other (See Comments)     Patient has a history of a clinically significant antibody against RBC antigens.  A delay in compatible RBCs may occur. Unidentified antibody found on 8/8/24 at [facility].    Codeine Sulfate Itching    Shrimp Swelling     "Oxycodone Other (See Comments)    Shrimp Extract Swelling    Bactrim [Sulfamethoxazole W/Trimethoprim]      Patient unable to recall    Biaxin [Clarithromycin]     Chlorthalidone Nausea and Vomiting    Clonidine     Darvon [Propoxyphene Hcl]     Dilaudid [Hydromorphone] Visual Disturbance and Hallucination     Tolerated in April 2024 hospital admissions    Gabapentin Fatigue and Confusion     \"felt drunk\"    Indomethacin     Levaquin [Levofloxacin Hemihydrate]     Morphine Sulfate     Percocet [Oxycodone-Acetaminophen] Hallucination    Pregabalin Itching and Fatigue    Simvastatin Palpitations     Muscle weakness, leg cramping      Spironolactone      Dehydrated      Terazosin        Social History   I have reviewed this patient's social history and updated it with pertinent information if needed. Tessa Mansfield  reports that she has quit smoking. Her smoking use included cigarettes. She has a 4.5 pack-year smoking history. She has never used smokeless tobacco. She reports that she does not use drugs.    Family History   I have reviewed this patient's family history and updated it with pertinent information if needed.   Family History   Problem Relation Age of Onset    Cancer Sister 82        bladder cancer     Review of Systems   The 10 point Review of Systems is negative other than noted in the HPI or here.          Medications:     Current Facility-Administered Medications   Medication Dose Route Frequency Provider Last Rate Last Admin    aspirin EC tablet 81 mg  81 mg Oral Once Rodrick Garcia MD        [START ON 8/9/2024] aspirin EC tablet 81 mg  81 mg Oral Daily Randa Ann, CHRISTOPHER        sodium chloride (PF) 0.9% PF flush 3 mL  3 mL Intracatheter Q8H Clay Jimenez MD        sodium chloride (PF) 0.9% PF flush 3 mL  3 mL Intracatheter Q8H Rodrick Garcia MD            Vitals  Vitals:    08/08/24 1545 08/08/24 1600 08/08/24 1615 08/08/24 1625   BP: (!) 87/50 (!) 73/50 (!) 79/54    BP Location:    " "   Pulse: 61 60 64 60   Resp: 16 16 18 14   Temp:    97.6  F (36.4  C)   TempSrc:       SpO2: 99% 99% 99% 100%   Weight:       Height:         Vitals:    08/08/24 0500   Weight: 66.7 kg (147 lb 0.8 oz)   No intake/output data recorded.  Vent Settings:  Resp: 14 SpO2: 100 % O2 Device: Nasal cannula Oxygen Delivery: 2 LPM  Resp: 14              Physical Exam:     GEN:  NAD  HEENT:  Normocephalic/atraumatic, no scleral icterus, no nasal discharge  CV:  RRR, no murmurs  LUNGS:  Clear to auscultation bilaterally, no wheezes or crackles.   ABD:  Active bowel sounds, soft  EXT:  Trace BLE edema, warm  SKIN:  Dry to touch, no exanthems noted in the visualized areas.  NEURO:  Alert and oriented, no new focal deficits appreciated.  PSCYH: Normal mood and affect            Data:     Temp: 97.6  F (36.4  C) Temp src: OralTemp  Min: 97.6  F (36.4  C)  Max: 98  F (36.7  C)   Recent Labs   Lab 08/08/24  1421 08/08/24  0651 08/05/24  1306   WBC  --  6.4 6.9   HGB 7.6* 8.8* 9.0*   HCT  --  29.1* 30.1*   MCV  --  97 96   RDW  --  17.2* 16.7*   PLT  --  99* 95*     Recent Labs   Lab 08/08/24  0651 08/05/24  1306   INR 1.14 1.22*   PTT 35  --      No lab results found in last 7 days.    Liver Function Studies -   Recent Labs   Lab Test 08/05/24  1305 07/08/24  1423 07/03/24  1326   PROTTOTAL  --   --  6.1*   ALBUMIN 4.1   < > 3.7   BILITOTAL  --   --  0.3   ALKPHOS  --   --  50   AST  --   --  31   ALT  --   --  16    < > = values in this interval not displayed.       Last Arterial Blood Gas:  Recent Labs   Lab 08/08/24  1421   PH 7.35   PCO2 35   PO2 81   HCO3 19*   O2PER 40.0       Venous Blood Gas  Recent Labs   Lab 08/08/24  1421   O2PER 40.0       Recent Labs   Lab Test 11/20/23  0949 06/17/22  1044   CHOL 105 139   HDL 44* 50   LDL 36 60   TRIG 124 144       Blood culture:  Invalid input(s): \"BC\"   Urine culture:  No results for input(s): \"URC\" in the last 168 hours.    No lab results found in last 7 days.    EKG 8/8/24:   Sinus " rhythm, 1st degree AVB, PVCs    TTE 10/9/23:  Interpretation Summary  Global and regional left ventricular function is normal with an EF of 55-60%.  Right ventricular function, chamber size, wall motion, and thickness are  normal.  Mild to moderate mitral insufficiency is present.  Mild tricuspid insufficiency is present.  The right ventricular systolic pressure is 35mmHg above the right atrial  pressure.  The inferior vena cava is normal.  No pericardial effusion is present.    Coronary Angiogram 11/6/23:  Conclusion         Prox LAD to Mid LAD lesion is 40% stenosed.    Ost LAD to Prox LAD lesion is 50% stenosed.    Prox Cx to Mid Cx lesion is 55% stenosed.    1st Diag lesion is 85% stenosed.    2nd Diag lesion is 70% stenosed.    RPDA-2 lesion is 50% stenosed.    RPDA-1 lesion is 65% stenosed.    Mid LAD lesion is 70% stenosed.     S/p ALVAREZ to mid LAD         Coronary Findings    Diagnostic  Dominance: Right  Left Anterior Descending   The vessel is small.   Ost LAD to Prox LAD lesion is 50% stenosed. The lesion was previously treated using a stent of unknown type.   Prox LAD to Mid LAD lesion is 40% stenosed. The lesion was previously treated using a stent of unknown type.   Mid LAD lesion is 70% stenosed.      First Diagonal Branch   The vessel is small. There is moderate diffuse disease throughout the vessel.   1st Diag lesion is 85% stenosed.      Second Diagonal Branch   2nd Diag lesion is 70% stenosed.      Left Circumflex   The vessel is small.   Prox Cx to Mid Cx lesion is 55% stenosed. The lesion was previously treated using a stent of unknown type.      First Obtuse Marginal Branch   The vessel is large.      Second Obtuse Marginal Branch   The vessel is small.      Right Coronary Artery   The vessel is large.   Previously placed Mid RCA to Dist RCA drug-eluting and unknown type of stents are widely patent. The lesion was previously treated using angioplasty.   Previously placed Dist RCA drug-eluting and  unknown type of stents are widely patent. The lesion was previously treated using angioplasty.      Right Posterior Descending Artery   There is mild diffuse disease throughout the vessel.   RPDA-1 lesion is 65% stenosed.   RPDA-2 lesion is 50% stenosed. The lesion was previously treated using a stent of unknown type.      Right Posterior Atrioventricular Artery   There is mild diffuse disease throughout the vessel.         Intervention     Mid LAD lesion   Stent   The pre-interventional distal flow is normal (BREANNA 3). A STENT CORONARY ALVAREZ SYNERGY XD MR US [id number] drug eluting stent was successfully placed. Post-stent angioplasty was performed. The post-interventional distal flow is normal (BREANNA 3).   There is a 0% residual stenosis post intervention.

## 2024-08-08 NOTE — ANESTHESIA PROCEDURE NOTES
Arterial Line Procedure Note    Pre-Procedure   Staff -        Anesthesiologist:  Steven Morley MD       Performed By: anesthesiologist       Pre-Anesthestic Checklist: patient identified, IV checked, risks and benefits discussed, informed consent, monitors and equipment checked, pre-op evaluation and at physician/surgeon's request  Timeout:       Correct Patient: Yes        Correct Procedure: Yes        Correct Site: Yes        Correct Position: Yes   Line Placement:   This line was placed Pre Induction starting at 8/8/2024 12:15 PM and ending at 8/8/2024 12:25 PM  Procedure   Procedure: arterial line       Laterality: left       Insertion Site: radial.  Sterile Prep        Standard elements of sterile barrier followed       Skin prep: Chloraprep  Insertion/Injection        Technique: Seldinger Technique        Catheter Type/Size: 20 G, 1.75 in/4.5 cm quick cath (integral wire)  Narrative        Tegaderm dressing used.       Complications: None apparent,        Arterial waveform: Yes        IBP within 10% of NIBP: Yes

## 2024-08-08 NOTE — ANESTHESIA POSTPROCEDURE EVALUATION
Patient: Tessa Mansfield    Procedure: Procedure(s):  Left Atrial Appendage Closure       Anesthesia Type:  General    Note:  Disposition: Disposition Change/Cancellation   Postop Pain Control: Uneventful            Sign Out: Well controlled pain   PONV: No   Neuro/Psych: Uneventful            Sign Out: Acceptable/Baseline neuro status   Airway/Respiratory: Uneventful            Sign Out: Acceptable/Baseline resp. status   CV/Hemodynamics:    Other NRE:    DID A NON-ROUTINE EVENT OCCUR? YES    Event details/Postop Comments:  Appeared stable in PACU, labs including hgb also stable, however there was a concern by cath lab for pericardial effusion intra-op and they did call and inform PACU desk they were taking her back to the cath lab. No direct communication with anesthesia team regarding this.           Last vitals:  Vitals Value Taken Time   BP 93/56 08/08/24 1730   Temp 36.4  C (97.6  F) 08/08/24 1625   Pulse 66 08/08/24 1737   Resp 15 08/08/24 1737   SpO2 100 % 08/08/24 1737   Vitals shown include unfiled device data.    Electronically Signed By: Tony Kumar MD  August 8, 2024  5:37 PM

## 2024-08-08 NOTE — OR NURSING
Received SBAR report and assumed care of patient.  Patient is resting comfortably and denies any pain.  No needs at this time.  Spouse is at bedside.

## 2024-08-08 NOTE — OR NURSING
Patient has not needs; still awaiting cross match for blood.  Patient states that she is comfortable. No needs.

## 2024-08-09 ENCOUNTER — APPOINTMENT (OUTPATIENT)
Dept: CARDIOLOGY | Facility: CLINIC | Age: 89
DRG: 274 | End: 2024-08-09
Attending: INTERNAL MEDICINE
Payer: COMMERCIAL

## 2024-08-09 ENCOUNTER — APPOINTMENT (OUTPATIENT)
Dept: ULTRASOUND IMAGING | Facility: CLINIC | Age: 89
DRG: 274 | End: 2024-08-09
Attending: STUDENT IN AN ORGANIZED HEALTH CARE EDUCATION/TRAINING PROGRAM
Payer: COMMERCIAL

## 2024-08-09 ENCOUNTER — APPOINTMENT (OUTPATIENT)
Dept: GENERAL RADIOLOGY | Facility: CLINIC | Age: 89
DRG: 274 | End: 2024-08-09
Attending: STUDENT IN AN ORGANIZED HEALTH CARE EDUCATION/TRAINING PROGRAM
Payer: COMMERCIAL

## 2024-08-09 ENCOUNTER — APPOINTMENT (OUTPATIENT)
Dept: CARDIOLOGY | Facility: CLINIC | Age: 89
DRG: 274 | End: 2024-08-09
Attending: NURSE PRACTITIONER
Payer: COMMERCIAL

## 2024-08-09 ENCOUNTER — TELEPHONE (OUTPATIENT)
Dept: FAMILY MEDICINE | Facility: CLINIC | Age: 89
End: 2024-08-09

## 2024-08-09 DIAGNOSIS — I25.118 CORONARY ARTERY DISEASE OF NATIVE ARTERY OF NATIVE HEART WITH STABLE ANGINA PECTORIS (H): ICD-10-CM

## 2024-08-09 LAB
ALBUMIN SERPL BCG-MCNC: 3.4 G/DL (ref 3.5–5.2)
ALBUMIN SERPL BCG-MCNC: 3.5 G/DL (ref 3.5–5.2)
ALBUMIN SERPL BCG-MCNC: 3.7 G/DL (ref 3.5–5.2)
ALP SERPL-CCNC: 116 U/L (ref 40–150)
ALP SERPL-CCNC: 121 U/L (ref 40–150)
ALP SERPL-CCNC: 133 U/L (ref 40–150)
ALT SERPL W P-5'-P-CCNC: 228 U/L (ref 0–50)
ALT SERPL W P-5'-P-CCNC: 243 U/L (ref 0–50)
ALT SERPL W P-5'-P-CCNC: 282 U/L (ref 0–50)
ANION GAP SERPL CALCULATED.3IONS-SCNC: 13 MMOL/L (ref 7–15)
ANION GAP SERPL CALCULATED.3IONS-SCNC: 13 MMOL/L (ref 7–15)
ANION GAP SERPL CALCULATED.3IONS-SCNC: 17 MMOL/L (ref 7–15)
APTT PPP: 35 SECONDS (ref 22–38)
AST SERPL W P-5'-P-CCNC: 424 U/L (ref 0–45)
AST SERPL W P-5'-P-CCNC: 499 U/L (ref 0–45)
AST SERPL W P-5'-P-CCNC: 547 U/L (ref 0–45)
ATRIAL RATE - MUSE: 61 BPM
ATRIAL RATE - MUSE: 69 BPM
ATRIAL RATE - MUSE: 70 BPM
BILIRUB DIRECT SERPL-MCNC: 0.26 MG/DL (ref 0–0.3)
BILIRUB SERPL-MCNC: 0.3 MG/DL
BILIRUB SERPL-MCNC: 0.4 MG/DL
BILIRUB SERPL-MCNC: 0.8 MG/DL
BUN SERPL-MCNC: 45 MG/DL (ref 8–23)
BUN SERPL-MCNC: 46.8 MG/DL (ref 8–23)
BUN SERPL-MCNC: 47 MG/DL (ref 8–23)
C PNEUM DNA SPEC QL NAA+PROBE: NOT DETECTED
CALCIUM SERPL-MCNC: 7.8 MG/DL (ref 8.8–10.4)
CALCIUM SERPL-MCNC: 8.1 MG/DL (ref 8.8–10.4)
CALCIUM SERPL-MCNC: 8.1 MG/DL (ref 8.8–10.4)
CHLORIDE SERPL-SCNC: 112 MMOL/L (ref 98–107)
CHLORIDE SERPL-SCNC: 112 MMOL/L (ref 98–107)
CHLORIDE SERPL-SCNC: 115 MMOL/L (ref 98–107)
CREAT SERPL-MCNC: 1.75 MG/DL (ref 0.51–0.95)
CREAT SERPL-MCNC: 1.76 MG/DL (ref 0.51–0.95)
CREAT SERPL-MCNC: 1.88 MG/DL (ref 0.51–0.95)
DIASTOLIC BLOOD PRESSURE - MUSE: NORMAL MMHG
EGFRCR SERPLBLD CKD-EPI 2021: 25 ML/MIN/1.73M2
EGFRCR SERPLBLD CKD-EPI 2021: 28 ML/MIN/1.73M2
EGFRCR SERPLBLD CKD-EPI 2021: 28 ML/MIN/1.73M2
ERYTHROCYTE [DISTWIDTH] IN BLOOD BY AUTOMATED COUNT: 17.3 % (ref 10–15)
ERYTHROCYTE [DISTWIDTH] IN BLOOD BY AUTOMATED COUNT: 17.6 % (ref 10–15)
FIBRINOGEN PPP-MCNC: 308 MG/DL (ref 170–510)
FLUAV H1 2009 PAND RNA SPEC QL NAA+PROBE: NOT DETECTED
FLUAV H1 RNA SPEC QL NAA+PROBE: NOT DETECTED
FLUAV H3 RNA SPEC QL NAA+PROBE: NOT DETECTED
FLUAV RNA SPEC QL NAA+PROBE: NOT DETECTED
FLUBV RNA SPEC QL NAA+PROBE: NOT DETECTED
GLUCOSE BLDC GLUCOMTR-MCNC: 165 MG/DL (ref 70–99)
GLUCOSE BLDC GLUCOMTR-MCNC: 182 MG/DL (ref 70–99)
GLUCOSE SERPL-MCNC: 164 MG/DL (ref 70–99)
GLUCOSE SERPL-MCNC: 177 MG/DL (ref 70–99)
GLUCOSE SERPL-MCNC: 177 MG/DL (ref 70–99)
HADV DNA SPEC QL NAA+PROBE: NOT DETECTED
HCO3 SERPL-SCNC: 14 MMOL/L (ref 22–29)
HCO3 SERPL-SCNC: 15 MMOL/L (ref 22–29)
HCO3 SERPL-SCNC: 16 MMOL/L (ref 22–29)
HCOV PNL SPEC NAA+PROBE: NOT DETECTED
HCT VFR BLD AUTO: 29.2 % (ref 35–47)
HCT VFR BLD AUTO: 30 % (ref 35–47)
HGB BLD-MCNC: 9 G/DL (ref 11.7–15.7)
HGB BLD-MCNC: 9.3 G/DL (ref 11.7–15.7)
HMPV RNA SPEC QL NAA+PROBE: NOT DETECTED
HPIV1 RNA SPEC QL NAA+PROBE: NOT DETECTED
HPIV2 RNA SPEC QL NAA+PROBE: NOT DETECTED
HPIV3 RNA SPEC QL NAA+PROBE: NOT DETECTED
HPIV4 RNA SPEC QL NAA+PROBE: NOT DETECTED
INR PPP: 1.31 (ref 0.85–1.15)
INTERPRETATION ECG - MUSE: NORMAL
LACTATE SERPL-SCNC: 1.6 MMOL/L (ref 0.7–2)
LACTATE SERPL-SCNC: 2.6 MMOL/L (ref 0.7–2)
LACTATE SERPL-SCNC: 2.7 MMOL/L (ref 0.7–2)
LACTATE SERPL-SCNC: 3.2 MMOL/L (ref 0.7–2)
LACTATE SERPL-SCNC: 4 MMOL/L (ref 0.7–2)
LACTATE SERPL-SCNC: 5.8 MMOL/L (ref 0.7–2)
LVEF ECHO: NORMAL
Lab: NORMAL
M PNEUMO DNA SPEC QL NAA+PROBE: NOT DETECTED
MAGNESIUM SERPL-MCNC: 2.1 MG/DL (ref 1.7–2.3)
MCH RBC QN AUTO: 29.2 PG (ref 26.5–33)
MCH RBC QN AUTO: 29.7 PG (ref 26.5–33)
MCHC RBC AUTO-ENTMCNC: 30.8 G/DL (ref 31.5–36.5)
MCHC RBC AUTO-ENTMCNC: 31 G/DL (ref 31.5–36.5)
MCV RBC AUTO: 95 FL (ref 78–100)
MCV RBC AUTO: 96 FL (ref 78–100)
MRSA DNA SPEC QL NAA+PROBE: NEGATIVE
NT-PROBNP SERPL-MCNC: 3033 PG/ML (ref 0–1800)
P AXIS - MUSE: 102 DEGREES
P AXIS - MUSE: 74 DEGREES
P AXIS - MUSE: 93 DEGREES
PERFORMING LABORATORY: NORMAL
PHOSPHATE SERPL-MCNC: 3.4 MG/DL (ref 2.5–4.5)
PLATELET # BLD AUTO: 91 10E3/UL (ref 150–450)
PLATELET # BLD AUTO: 92 10E3/UL (ref 150–450)
POTASSIUM SERPL-SCNC: 4.3 MMOL/L (ref 3.4–5.3)
POTASSIUM SERPL-SCNC: 4.4 MMOL/L (ref 3.4–5.3)
POTASSIUM SERPL-SCNC: 4.8 MMOL/L (ref 3.4–5.3)
PR INTERVAL - MUSE: 192 MS
PR INTERVAL - MUSE: 202 MS
PR INTERVAL - MUSE: 232 MS
PROT SERPL-MCNC: 5.6 G/DL (ref 6.4–8.3)
PROT SERPL-MCNC: 5.7 G/DL (ref 6.4–8.3)
PROT SERPL-MCNC: 6.1 G/DL (ref 6.4–8.3)
QRS DURATION - MUSE: 86 MS
QRS DURATION - MUSE: 90 MS
QRS DURATION - MUSE: 90 MS
QT - MUSE: 424 MS
QT - MUSE: 432 MS
QT - MUSE: 448 MS
QTC - MUSE: 450 MS
QTC - MUSE: 454 MS
QTC - MUSE: 466 MS
R AXIS - MUSE: 20 DEGREES
R AXIS - MUSE: 33 DEGREES
R AXIS - MUSE: 50 DEGREES
RBC # BLD AUTO: 3.08 10E6/UL (ref 3.8–5.2)
RBC # BLD AUTO: 3.13 10E6/UL (ref 3.8–5.2)
RSV RNA SPEC QL NAA+PROBE: NOT DETECTED
RSV RNA SPEC QL NAA+PROBE: NOT DETECTED
RV+EV RNA SPEC QL NAA+PROBE: NOT DETECTED
SA TARGET DNA: POSITIVE
SARS-COV-2 RNA RESP QL NAA+PROBE: NEGATIVE
SODIUM SERPL-SCNC: 140 MMOL/L (ref 135–145)
SODIUM SERPL-SCNC: 143 MMOL/L (ref 135–145)
SODIUM SERPL-SCNC: 144 MMOL/L (ref 135–145)
SPECIMEN STATUS: NORMAL
SYSTOLIC BLOOD PRESSURE - MUSE: NORMAL MMHG
T AXIS - MUSE: 48 DEGREES
T AXIS - MUSE: 56 DEGREES
T AXIS - MUSE: 62 DEGREES
TEST NAME: NORMAL
TROPONIN T SERPL HS-MCNC: 77 NG/L
VENTRICULAR RATE- MUSE: 61 BPM
VENTRICULAR RATE- MUSE: 69 BPM
VENTRICULAR RATE- MUSE: 70 BPM
WBC # BLD AUTO: 14.8 10E3/UL (ref 4–11)
WBC # BLD AUTO: 17.4 10E3/UL (ref 4–11)

## 2024-08-09 PROCEDURE — 250N000013 HC RX MED GY IP 250 OP 250 PS 637: Performed by: NURSE PRACTITIONER

## 2024-08-09 PROCEDURE — 93971 EXTREMITY STUDY: CPT

## 2024-08-09 PROCEDURE — 83605 ASSAY OF LACTIC ACID: CPT | Performed by: STUDENT IN AN ORGANIZED HEALTH CARE EDUCATION/TRAINING PROGRAM

## 2024-08-09 PROCEDURE — 93976 VASCULAR STUDY: CPT | Mod: 26 | Performed by: STUDENT IN AN ORGANIZED HEALTH CARE EDUCATION/TRAINING PROGRAM

## 2024-08-09 PROCEDURE — 85384 FIBRINOGEN ACTIVITY: CPT | Performed by: STUDENT IN AN ORGANIZED HEALTH CARE EDUCATION/TRAINING PROGRAM

## 2024-08-09 PROCEDURE — 93971 EXTREMITY STUDY: CPT | Mod: 26 | Performed by: STUDENT IN AN ORGANIZED HEALTH CARE EDUCATION/TRAINING PROGRAM

## 2024-08-09 PROCEDURE — 85730 THROMBOPLASTIN TIME PARTIAL: CPT | Performed by: STUDENT IN AN ORGANIZED HEALTH CARE EDUCATION/TRAINING PROGRAM

## 2024-08-09 PROCEDURE — 80048 BASIC METABOLIC PNL TOTAL CA: CPT | Performed by: INTERNAL MEDICINE

## 2024-08-09 PROCEDURE — 999N000248 HC STATISTIC IV INSERT WITH US BY RN

## 2024-08-09 PROCEDURE — 80053 COMPREHEN METABOLIC PANEL: CPT | Performed by: STUDENT IN AN ORGANIZED HEALTH CARE EDUCATION/TRAINING PROGRAM

## 2024-08-09 PROCEDURE — 85027 COMPLETE CBC AUTOMATED: CPT | Performed by: STUDENT IN AN ORGANIZED HEALTH CARE EDUCATION/TRAINING PROGRAM

## 2024-08-09 PROCEDURE — 258N000003 HC RX IP 258 OP 636

## 2024-08-09 PROCEDURE — 87633 RESP VIRUS 12-25 TARGETS: CPT | Performed by: STUDENT IN AN ORGANIZED HEALTH CARE EDUCATION/TRAINING PROGRAM

## 2024-08-09 PROCEDURE — 71045 X-RAY EXAM CHEST 1 VIEW: CPT

## 2024-08-09 PROCEDURE — 93926 LOWER EXTREMITY STUDY: CPT | Mod: 26 | Performed by: STUDENT IN AN ORGANIZED HEALTH CARE EDUCATION/TRAINING PROGRAM

## 2024-08-09 PROCEDURE — 85610 PROTHROMBIN TIME: CPT | Performed by: STUDENT IN AN ORGANIZED HEALTH CARE EDUCATION/TRAINING PROGRAM

## 2024-08-09 PROCEDURE — 84100 ASSAY OF PHOSPHORUS: CPT | Performed by: INTERNAL MEDICINE

## 2024-08-09 PROCEDURE — 250N000013 HC RX MED GY IP 250 OP 250 PS 637: Performed by: STUDENT IN AN ORGANIZED HEALTH CARE EDUCATION/TRAINING PROGRAM

## 2024-08-09 PROCEDURE — 93321 DOPPLER ECHO F-UP/LMTD STD: CPT | Mod: 26 | Performed by: INTERNAL MEDICINE

## 2024-08-09 PROCEDURE — 250N000011 HC RX IP 250 OP 636: Performed by: STUDENT IN AN ORGANIZED HEALTH CARE EDUCATION/TRAINING PROGRAM

## 2024-08-09 PROCEDURE — 87641 MR-STAPH DNA AMP PROBE: CPT | Performed by: STUDENT IN AN ORGANIZED HEALTH CARE EDUCATION/TRAINING PROGRAM

## 2024-08-09 PROCEDURE — 36415 COLL VENOUS BLD VENIPUNCTURE: CPT | Performed by: STUDENT IN AN ORGANIZED HEALTH CARE EDUCATION/TRAINING PROGRAM

## 2024-08-09 PROCEDURE — 85027 COMPLETE CBC AUTOMATED: CPT | Performed by: INTERNAL MEDICINE

## 2024-08-09 PROCEDURE — 87040 BLOOD CULTURE FOR BACTERIA: CPT | Performed by: STUDENT IN AN ORGANIZED HEALTH CARE EDUCATION/TRAINING PROGRAM

## 2024-08-09 PROCEDURE — 99222 1ST HOSP IP/OBS MODERATE 55: CPT | Mod: GC | Performed by: INTERNAL MEDICINE

## 2024-08-09 PROCEDURE — 93976 VASCULAR STUDY: CPT

## 2024-08-09 PROCEDURE — 36415 COLL VENOUS BLD VENIPUNCTURE: CPT | Performed by: INTERNAL MEDICINE

## 2024-08-09 PROCEDURE — 258N000003 HC RX IP 258 OP 636: Performed by: STUDENT IN AN ORGANIZED HEALTH CARE EDUCATION/TRAINING PROGRAM

## 2024-08-09 PROCEDURE — 76705 ECHO EXAM OF ABDOMEN: CPT | Mod: 26 | Performed by: STUDENT IN AN ORGANIZED HEALTH CARE EDUCATION/TRAINING PROGRAM

## 2024-08-09 PROCEDURE — 250N000011 HC RX IP 250 OP 636: Performed by: NURSE PRACTITIONER

## 2024-08-09 PROCEDURE — 83735 ASSAY OF MAGNESIUM: CPT | Performed by: INTERNAL MEDICINE

## 2024-08-09 PROCEDURE — 120N000003 HC R&B IMCU UMMC

## 2024-08-09 PROCEDURE — 84484 ASSAY OF TROPONIN QUANT: CPT | Performed by: STUDENT IN AN ORGANIZED HEALTH CARE EDUCATION/TRAINING PROGRAM

## 2024-08-09 PROCEDURE — 74018 RADEX ABDOMEN 1 VIEW: CPT | Mod: 26 | Performed by: STUDENT IN AN ORGANIZED HEALTH CARE EDUCATION/TRAINING PROGRAM

## 2024-08-09 PROCEDURE — 258N000003 HC RX IP 258 OP 636: Performed by: INTERNAL MEDICINE

## 2024-08-09 PROCEDURE — 83880 ASSAY OF NATRIURETIC PEPTIDE: CPT | Performed by: INTERNAL MEDICINE

## 2024-08-09 PROCEDURE — 250N000011 HC RX IP 250 OP 636: Performed by: INTERNAL MEDICINE

## 2024-08-09 PROCEDURE — 93325 DOPPLER ECHO COLOR FLOW MAPG: CPT

## 2024-08-09 PROCEDURE — 87635 SARS-COV-2 COVID-19 AMP PRB: CPT | Performed by: STUDENT IN AN ORGANIZED HEALTH CARE EDUCATION/TRAINING PROGRAM

## 2024-08-09 PROCEDURE — 250N000013 HC RX MED GY IP 250 OP 250 PS 637: Performed by: INTERNAL MEDICINE

## 2024-08-09 PROCEDURE — 93308 TTE F-UP OR LMTD: CPT | Mod: 26 | Performed by: INTERNAL MEDICINE

## 2024-08-09 PROCEDURE — 71045 X-RAY EXAM CHEST 1 VIEW: CPT | Mod: 26 | Performed by: STUDENT IN AN ORGANIZED HEALTH CARE EDUCATION/TRAINING PROGRAM

## 2024-08-09 PROCEDURE — 93325 DOPPLER ECHO COLOR FLOW MAPG: CPT | Mod: 26 | Performed by: INTERNAL MEDICINE

## 2024-08-09 PROCEDURE — 250N000009 HC RX 250: Performed by: STUDENT IN AN ORGANIZED HEALTH CARE EDUCATION/TRAINING PROGRAM

## 2024-08-09 PROCEDURE — 83605 ASSAY OF LACTIC ACID: CPT | Performed by: INTERNAL MEDICINE

## 2024-08-09 PROCEDURE — 74018 RADEX ABDOMEN 1 VIEW: CPT

## 2024-08-09 RX ORDER — UREA 10 %
500 LOTION (ML) TOPICAL DAILY
Status: DISCONTINUED | OUTPATIENT
Start: 2024-08-09 | End: 2024-08-15 | Stop reason: HOSPADM

## 2024-08-09 RX ORDER — METHOCARBAMOL 500 MG/1
500 TABLET, FILM COATED ORAL EVERY 6 HOURS PRN
Status: DISCONTINUED | OUTPATIENT
Start: 2024-08-09 | End: 2024-08-15 | Stop reason: HOSPADM

## 2024-08-09 RX ORDER — ROSUVASTATIN CALCIUM 20 MG/1
20 TABLET, COATED ORAL DAILY
COMMUNITY
End: 2024-09-09

## 2024-08-09 RX ORDER — PANTOPRAZOLE SODIUM 40 MG/1
40 TABLET, DELAYED RELEASE ORAL 2 TIMES DAILY
Status: DISCONTINUED | OUTPATIENT
Start: 2024-08-09 | End: 2024-08-15 | Stop reason: HOSPADM

## 2024-08-09 RX ORDER — LIDOCAINE 4 G/G
1 PATCH TOPICAL EVERY 24 HOURS
Status: DISCONTINUED | OUTPATIENT
Start: 2024-08-09 | End: 2024-08-15 | Stop reason: HOSPADM

## 2024-08-09 RX ORDER — VANCOMYCIN HYDROCHLORIDE
1500 ONCE
Status: DISCONTINUED | OUTPATIENT
Start: 2024-08-09 | End: 2024-08-09

## 2024-08-09 RX ORDER — CLOPIDOGREL BISULFATE 75 MG/1
75 TABLET ORAL DAILY
Status: DISCONTINUED | OUTPATIENT
Start: 2024-08-09 | End: 2024-08-15 | Stop reason: HOSPADM

## 2024-08-09 RX ORDER — MECLIZINE HCL 12.5 MG 12.5 MG/1
12.5 TABLET ORAL ONCE
Status: COMPLETED | OUTPATIENT
Start: 2024-08-09 | End: 2024-08-09

## 2024-08-09 RX ORDER — LIDOCAINE 4 G/G
1 PATCH TOPICAL
Status: DISCONTINUED | OUTPATIENT
Start: 2024-08-09 | End: 2024-08-09

## 2024-08-09 RX ORDER — PIPERACILLIN SODIUM, TAZOBACTAM SODIUM 3; .375 G/15ML; G/15ML
3.38 INJECTION, POWDER, LYOPHILIZED, FOR SOLUTION INTRAVENOUS EVERY 6 HOURS
Status: DISCONTINUED | OUTPATIENT
Start: 2024-08-09 | End: 2024-08-09

## 2024-08-09 RX ORDER — TIMOLOL MALEATE 5 MG/ML
1 SOLUTION/ DROPS OPHTHALMIC DAILY
Status: DISCONTINUED | OUTPATIENT
Start: 2024-08-09 | End: 2024-08-15 | Stop reason: HOSPADM

## 2024-08-09 RX ORDER — ISOSORBIDE MONONITRATE 20 MG/1
60 TABLET ORAL 2 TIMES DAILY
Qty: 100 TABLET | Refills: 0 | Status: SHIPPED | OUTPATIENT
Start: 2024-08-09 | End: 2024-08-15

## 2024-08-09 RX ORDER — ALLOPURINOL 100 MG/1
100 TABLET ORAL DAILY
Status: DISCONTINUED | OUTPATIENT
Start: 2024-08-09 | End: 2024-08-15 | Stop reason: HOSPADM

## 2024-08-09 RX ADMIN — SODIUM CHLORIDE, POTASSIUM CHLORIDE, SODIUM LACTATE AND CALCIUM CHLORIDE 750 ML: 600; 310; 30; 20 INJECTION, SOLUTION INTRAVENOUS at 05:23

## 2024-08-09 RX ADMIN — MECLIZINE HYDROCHLORIDE 12.5 MG: 12.5 TABLET ORAL at 11:57

## 2024-08-09 RX ADMIN — ACETAMINOPHEN 650 MG: 325 TABLET, FILM COATED ORAL at 21:04

## 2024-08-09 RX ADMIN — VANCOMYCIN HYDROCHLORIDE 1500 MG: 10 INJECTION, POWDER, LYOPHILIZED, FOR SOLUTION INTRAVENOUS at 06:53

## 2024-08-09 RX ADMIN — CLOPIDOGREL BISULFATE 75 MG: 75 TABLET ORAL at 08:28

## 2024-08-09 RX ADMIN — LIDOCAINE 1 PATCH: 4 PATCH TOPICAL at 03:35

## 2024-08-09 RX ADMIN — PANTOPRAZOLE SODIUM 40 MG: 40 TABLET, DELAYED RELEASE ORAL at 20:10

## 2024-08-09 RX ADMIN — ALLOPURINOL 100 MG: 100 TABLET ORAL at 11:57

## 2024-08-09 RX ADMIN — CYANOCOBALAMIN TAB 500 MCG 500 MCG: 500 TAB at 11:57

## 2024-08-09 RX ADMIN — SODIUM CHLORIDE, POTASSIUM CHLORIDE, SODIUM LACTATE AND CALCIUM CHLORIDE 500 ML: 600; 310; 30; 20 INJECTION, SOLUTION INTRAVENOUS at 21:54

## 2024-08-09 RX ADMIN — ASPIRIN 81 MG: 81 TABLET ORAL at 08:28

## 2024-08-09 RX ADMIN — ACETAMINOPHEN 650 MG: 325 TABLET, FILM COATED ORAL at 15:21

## 2024-08-09 RX ADMIN — ONDANSETRON 4 MG: 4 TABLET, ORALLY DISINTEGRATING ORAL at 08:35

## 2024-08-09 RX ADMIN — TIMOLOL MALEATE 1 DROP: 5 SOLUTION/ DROPS OPHTHALMIC at 11:57

## 2024-08-09 RX ADMIN — PANTOPRAZOLE SODIUM 40 MG: 40 TABLET, DELAYED RELEASE ORAL at 11:57

## 2024-08-09 RX ADMIN — PIPERACILLIN AND TAZOBACTAM 3.38 G: 3; .375 INJECTION, POWDER, FOR SOLUTION INTRAVENOUS at 03:28

## 2024-08-09 ASSESSMENT — ACTIVITIES OF DAILY LIVING (ADL)
ADLS_ACUITY_SCORE: 32
ADLS_ACUITY_SCORE: 28
ADLS_ACUITY_SCORE: 32
ADLS_ACUITY_SCORE: 32
ADLS_ACUITY_SCORE: 28
ADLS_ACUITY_SCORE: 28
ADLS_ACUITY_SCORE: 32
ADLS_ACUITY_SCORE: 29
ADLS_ACUITY_SCORE: 28
ADLS_ACUITY_SCORE: 32
ADLS_ACUITY_SCORE: 28
DEPENDENT_IADLS:: LAUNDRY;MEDICATION MANAGEMENT;TRANSPORTATION
ADLS_ACUITY_SCORE: 32
ADLS_ACUITY_SCORE: 32
ADLS_ACUITY_SCORE: 28
ADLS_ACUITY_SCORE: 32

## 2024-08-09 NOTE — PLAN OF CARE
"NURSING PROGRESS NOTE  Patient Transfer In    Report received from Nelda bedside RN on C-ICU (unit) at 1530 (time).     Patient deemed stable for transfer.    Patient and their support system(  -[facility]) are aware of plans.     Patient transported to  (room) on tele, on monitor , room air via patient bed with RN.    Patient settled and oriented to room, telemetry placed on patient and initial vital signs completed.  Patient educated on \"call don't fall\", use of call light, etc.  Call light was placed within reach. Oriented to room. Safety in place.  Skin assessment completed by two RNs as part of initial assessment. Marian MEDINA  and writer.   Belongings:  bedside    Neuro: A&Ox4. Call appropriate. Denies dizziness or lightheadedness. No nausea or vomiting.   Cardiac: SR, HR 60s. VSS. /77   Pulse 78   Temp 97.5  F (36.4  C) (Oral)   Resp 20   Ht 1.676 m (5' 6\")   Wt 66.7 kg (147 lb 0.8 oz)   SpO2 100%   BMI 23.73 kg/m    Respiratory: Sating 97% on RA. Denies SOB.   GI/: Adequate urine output, gets up to go to the bathroom.   Diet/appetite: Regular diet  Activity: SBA uses wheeled walker from home.   Pain: Chest pain ( residual from procedure) when moving but some tenderness when at rest or when laying down. Pain 2/10.    Skin: Bruising on upper and lower extremities. Spot red on back, scattered moles around her body. Bruising from her outer lips both right and left side, patient said that she bit herself. Edema on BLE +1-2. CT sitee NASH CDI with dressing. Back is CDI with Mepilex for protection, changed after skin checked.   LDA's: Upper left arm PIV and right PIV CDI.     1630: Team updated on system triggered sepsis protocol. Last lactic check was around 3PM. Team ordered to recheck lactic at 8PM. VS checked.     Plan: 08/10 Echo. Continue with POC. Notify primary team with changes.        Uma Somers RN .................................................... August 9, 2024   5:03 PM  [facility]" [facility] ([facility]

## 2024-08-09 NOTE — PROGRESS NOTES
[facility]  Cardiac ICU Progress Note  August 9, 2024         Interval Events     S/p pericardiocentesis on 8/8 with 220 ml. Patient had chest pain following procedure, EKG without DAVEY. CTA chest following without evidence of bleed. LA increased overnight to 5.8 from 4.0. WBC increased to 17.4. Vanc/zosyn started overnight. Femoral sheath pulled, developed L groin hematoma after. US without pseudoaneurysm and active bleed. Patient has minor chest pain today but otherwise is feeling well.          Assessment & Plan        Tessa Mansfield is a 89 year old female who presents to the CICU following a Watchman device placement. 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 Admitted to the CICU due to development of a pericardial effusion following WATCHMAN placement. Because subsequent TTE suggested worsening of effusion, patient underwent pericardiocentesis on 8/8.     Changes:  - q12h LA - improving  - Trend LFTs  - Monitor off abx  - Restart DAPT  - If minimal pericardial drain output by tonight clamp and TTE tomorrow  - Transfer    Neuro:  #pain  - Currently controlled  Plan:  - Tylenol PRN, Consider Norco if additional pain control needed    #dizziness:  - Worse with head turning, associated with nausea. Not a prior issue  - No carotid bruit, neuro exam intact  Plan:  - Try meclizine now  - PT/OT    CV:  #pAF  #intolerance to AC due to GIB  #pericardial effusion s/p pericardiocentesis 8/8  - Patient with a CHADsVASC 5 and HASBLED of 5. She has had difficulty tolerating eliquis in the past due to recurrent GIB in the setting of AVMs  - s/p 31mm WATCHMAN FLX device placement on August 8, 2024. Procedure complicated by new pericardial effusion without tamponade. There was no contrast extravasation noted. Patient admitted for post procedural monitoring  -  s/p pericardiocentesis 8/8 with 220 ml off  Plan:  - Start aspirin 81 mg daily, and plavix 75 mg daily  - Daily CXR  - Hold PTA metoprolol tartrate 50 mg BID  - Tele  - Discontinue AC  - SBE prophylaxis x 6 months post LAAO   - Follow-up structural NAPOLEON 1 week and 45 days post procedure  - 45 days following Watchman device implantation, patient will return for MICHELL to evaluate endothelialization of the device. If adequate seal is assessed (<5mm), dual antiplatelet therapy with clopidogrel and aspirin will be continued for total of 6 months from implantation date, with aspirin then continuing lifelong.      #CAD s/p multiple PCIs  #HLD  - Extensive CAD history with multiple PCIs in the past. Most recently patient had a PCI to the mLAD on 11/26/23.   - Currently without anginal symptoms  Plan:  - ASA to restart tomorrow as above  - Tele  - Continue PTA rosuvastatin 20 mg daily  - Hold PTA metoprolol tartrate 50 gm BID, isosorbide mononitrate 60 mg BID     #HTN  #HFpEF  - Holding PTA BB, amlodipine 10 mg daily, isosorbide mononitrate 60 mg BID     #SSS s/p dual chamber PPM:  - Last device check 5/28/24 with 6.6% AF burden. AP 27.5%,  0.1%  Plan:  - OP EP follow up    Pulm:  No active issues     GI/Nutrition:  #nutrition  - ADAT    ID:  #concern for infection:  - Started on abx on 8/8 due to elevated LA and WBC. Currently without localizing symptoms  Plan:  - Monitor off abx    Renal:  #CKD IV  - Baseline Cr 1.7-2.0. Cr 1.71 on admission  Plan:  - Avoid nephrotoxic meds  - Renally dose meds    Heme:  #acute blood loss anemia - stable  #anemia of chronic disease  #history of GIB 2/2 AVMs  - Baseline Hgb ~7.5-9.0. Presented with Hgb 8.8, given 1 U pRBC during the case  - Off AC due to GIB in addition to concern for pericardial effusion  - Presently without overt blood losses  Plan:  - CBC at least daily. Currently without overt bleeding or high pericardial drain output    #L groin hematoma:  - Following pull of art sheath  8/8. Improved with local pressure and fem stop  - US without evidence of active bleeding and pseudoaneurysm  Plan:  - CTM    Endo:  #at risk for hyperglycemia  - No DM history  Plan:  - sliding scale insulin if necessary    The pt was discussed and evaluated with Dr. Olivas, attending physician, who agrees with the assessment and plan above.     Kenneth Friedman, PGY-6  Cardiovascular Disease Fellow    August 9, 2024        Objective   Vitals  Vitals:    08/09/24 0645 08/09/24 0700 08/09/24 0715 08/09/24 0800   BP:       BP Location:       Pulse: 72 70 69 70   Resp: 13 15 14 15   Temp:    97.4  F (36.3  C)   TempSrc:    Axillary   SpO2: 96% 96% 95% 95%   Weight:       Height:         Vitals:    08/08/24 0500   Weight: 66.7 kg (147 lb 0.8 oz)   I/O last 3 completed shifts:  In: 1886.38 [P.O.:300; I.V.:1286.38]  Out: 1140 [Urine:701; Blood:100; Chest Tube:339]  Vent Settings:  Resp: 15 SpO2: 95 % O2 Device: None (Room air) Oxygen Delivery: 2 LPM  Resp: 15    Intake/Output Summary (Last 24 hours) at 8/9/2024 0707  Last data filed at 8/9/2024 0700  Gross per 24 hour   Intake 2894.38 ml   Output 1142 ml   Net 1752.38 ml           Physical Exam:     GEN:  NAD  HEENT:  Normocephalic/atraumatic, no scleral icterus, no nasal discharge. No carotid bruit  CV:  RRR, no murmurs, pericardial drain in place.   LUNGS:  Clear to auscultation bilaterally, no wheezes or crackles.   ABD:  Active bowel sounds, soft  EXT:  Trace BLE edema, warm  SKIN:  Dry to touch, no exanthems noted in the visualized areas.  NEURO:  Alert and oriented, no new focal deficits appreciated. Moving all extremities. No cranial nerve abnormalities  PSCYH: Normal mood and affect            Data:   I have reviewed all relevant lab data from the last 24 hours and noted any significant changes below:    Hgb stable  WBC 17.4 from 14.8  Cr stable at 1.76  LA 5.8 from 4.0 from 1.6  LFTs decreasing (-->243, -->499)    I have reviewed all imaging from the  last 24 hours and noted any significant changes below:    Trivial pericardial effusion on post pericardiocentesis TTE  CTA without dissection    EKG 8/8/24:   Sinus rhythm, 1st degree AVB, PVCs     TTE 10/9/23:  Interpretation Summary  Global and regional left ventricular function is normal with an EF of 55-60%.  Right ventricular function, chamber size, wall motion, and thickness are  normal.  Mild to moderate mitral insufficiency is present.  Mild tricuspid insufficiency is present.  The right ventricular systolic pressure is 35mmHg above the right atrial  pressure.  The inferior vena cava is normal.  No pericardial effusion is present.     Coronary Angiogram 11/6/23:  Conclusion          Prox LAD to Mid LAD lesion is 40% stenosed.    Ost LAD to Prox LAD lesion is 50% stenosed.    Prox Cx to Mid Cx lesion is 55% stenosed.    1st Diag lesion is 85% stenosed.    2nd Diag lesion is 70% stenosed.    RPDA-2 lesion is 50% stenosed.    RPDA-1 lesion is 65% stenosed.    Mid LAD lesion is 70% stenosed.     S/p ALVAREZ to mid LAD           Coronary Findings     Diagnostic  Dominance: Right  Left Anterior Descending   The vessel is small.   Ost LAD to Prox LAD lesion is 50% stenosed. The lesion was previously treated using a stent of unknown type.   Prox LAD to Mid LAD lesion is 40% stenosed. The lesion was previously treated using a stent of unknown type.   Mid LAD lesion is 70% stenosed.      First Diagonal Branch   The vessel is small. There is moderate diffuse disease throughout the vessel.   1st Diag lesion is 85% stenosed.      Second Diagonal Branch   2nd Diag lesion is 70% stenosed.      Left Circumflex   The vessel is small.   Prox Cx to Mid Cx lesion is 55% stenosed. The lesion was previously treated using a stent of unknown type.      First Obtuse Marginal Branch   The vessel is large.      Second Obtuse Marginal Branch   The vessel is small.      Right Coronary Artery   The vessel is large.   Previously placed Mid  RCA to Dist RCA drug-eluting and unknown type of stents are widely patent. The lesion was previously treated using angioplasty.   Previously placed Dist RCA drug-eluting and unknown type of stents are widely patent. The lesion was previously treated using angioplasty.      Right Posterior Descending Artery   There is mild diffuse disease throughout the vessel.   RPDA-1 lesion is 65% stenosed.   RPDA-2 lesion is 50% stenosed. The lesion was previously treated using a stent of unknown type.      Right Posterior Atrioventricular Artery   There is mild diffuse disease throughout the vessel.         Intervention      Mid LAD lesion   Stent   The pre-interventional distal flow is normal (BREANNA 3). A STENT CORONARY ALVAREZ SYNERGY XD MR US [id number] [id number] drug eluting stent was successfully placed. Post-stent angioplasty was performed. The post-interventional distal flow is normal (BREANNA 3).   There is a 0% residual stenosis post intervention.             I have reviewed all medications from the last 24 hours.

## 2024-08-09 NOTE — PHARMACY-ADMISSION MEDICATION HISTORY
Pharmacist Admission Medication History    Admission medication history is complete. The information provided in this note is only as accurate as the sources available at the time of the update.    Information Source(s): Patient and CareEverywhere/SureScripts via in-person    Pertinent Information:   Patient was a good historian of their medications, confirming medication names, strengths, frequencies - last doses obtained by RN pre-procedure on 8/8/24.  Patient denied the use of any other prescription or over the counter medications.     Changes made to PTA medication list:  Added: None  Deleted:   Apixaban 2.5 mg tablet - take 2.5 mg by mouth 2 times daily (patient last took 8/4 and will not continue post Watchman placement)  Golytely 236 g suspension (old rx, completed)  Changed: None    Allergies reviewed with patient and updates made in EHR: no - patient reported multiple allergies but could not remember what they were    Medication History Completed By: Hermes Vicente RPH 8/9/2024 1:33 PM    PTA Med List   Medication Sig Note Last Dose    acetaminophen (TYLENOL) 325 MG tablet Take 975 mg by mouth nightly as needed for pain  8/8/2024 at 0445    allopurinol (ZYLOPRIM) 100 MG tablet Take 1 tablet (100 mg) by mouth daily  8/8/2024 at 0445    amLODIPine (NORVASC) 10 MG tablet Take 1 tablet (10 mg) by mouth daily  8/8/2024 at 0445    aspirin (ASA) 325 MG EC tablet Take 325 mg by mouth daily 8/6/2024: Will take 8/7 & 8/8 8/8/2024 at 0500    clopidogrel (PLAVIX) 75 MG tablet Take 1 tablet (75 mg) by mouth daily 8/6/2024: Was instructed to take DOS 8/8/2024 at 0500    fluticasone-salmeterol (ADVAIR) 250-50 MCG/ACT inhaler INHALE 1 DOSE BY MOUTH TWICE DAILY 8/6/2024: PRN More than a month    furosemide (LASIX) 40 MG tablet Take 1 tablet (40 mg) by mouth daily May take an additional 20 mg at noon as needed for swelling. 8/6/2024: Hold DOS 8/7/2024    isosorbide mononitrate (ISMO/MONOKET) 20 MG tablet Take 3 tablets (60  mg) by mouth 2 times daily  8/8/2024 at 0500    methocarbamol (ROBAXIN) 500 MG tablet Take 1 tablet (500 mg) by mouth every 6 hours as needed for muscle spasms  8/7/2024    metoprolol tartrate (LOPRESSOR) 25 MG tablet Take 2 tablets (50 mg) by mouth 2 times daily  8/8/2024 at 0500    Multiple Vitamins-Minerals (PRESERVISION AREDS 2+MULTI VIT PO) Take 1 capsule by mouth 2 times daily  Past Week at 0500    omeprazole (PRILOSEC) 40 MG DR capsule Take 1 capsule (40 mg) by mouth daily  8/8/2024 at 0500    potassium chloride ER (K-TAB) 20 MEQ CR tablet Take 1 tablet (20 mEq) by mouth daily 8/6/2024: Hold DOS 8/7/2024    rosuvastatin (CRESTOR) 20 MG tablet Take 20 mg by mouth daily  8/8/2024    rosuvastatin (CRESTOR) 20 MG tablet Take 1 tablet (20 mg) by mouth daily for 360 days      sodium bicarbonate 650 MG tablet Take 1 tablet (650 mg) by mouth 2 times daily 8/6/2024: Hold DOS 8/7/2024    timolol maleate (TIMOPTIC) 0.5 % ophthalmic solution INSTILL 1 DROP INTO EACH EYE ONCE DAILY IN THE MORNING  8/8/2024    vitamin B-12 (CYANOCOBALAMIN) 500 MCG tablet Take 1 tablet by mouth daily 8/6/2024: Hold For surgery 8/7/2024    vitamin D3 25 mcg (1000 units) tablet Take 1 tablet (25 mcg) by mouth every other day 8/6/2024: Hold For surgery 8/7/2024    [DISCONTINUED] isosorbide mononitrate (ISMO/MONOKET) 20 MG tablet Take 3 tablets (60 mg) by mouth 2 times daily  8/8/2024 at 0500

## 2024-08-09 NOTE — PHARMACY-VANCOMYCIN DOSING SERVICE
"Pharmacy Vancomycin Initial Note  Date of Service 2024  Patient's  1935  89 year old, female    Indication: Sepsis    Current estimated CrCl = Estimated Creatinine Clearance: 23.7 mL/min (A) (based on SCr of 1.76 mg/dL (H)).    Creatinine for last 3 days  2024:  6:51 AM Creatinine 1.71 mg/dL;  6:11 PM Creatinine 1.70 mg/dL  2024: 12:19 AM Creatinine 1.75 mg/dL;  4:12 AM Creatinine 1.76 mg/dL    Recent Vancomycin Level(s) for last 3 days  No results found for requested labs within last 3 days.      Vancomycin IV Administrations (past 72 hours)        No vancomycin orders with administrations in past 72 hours.                    Nephrotoxins and other renal medications (From now, onward)      Start     Dose/Rate Route Frequency Ordered Stop    24 0630  vancomycin (VANCOCIN) 1,500 mg in 0.9% NaCl 250 mL intermittent infusion         1,500 mg  166.7 mL/hr over 90 Minutes Intravenous ONCE 24 0614      24 0300  piperacillin-tazobactam (ZOSYN) 3.375 g vial to attach to  mL bag        Note to Pharmacy: For [facility], [facility] and [facility]: For Zosyn-naive patients, use the \"Zosyn initial dose + extended infusion\" order panel.    3.375 g  over 30 Minutes Intravenous EVERY 6 HOURS 24 0208              Contrast Orders - past 72 hours (72h ago, onward)      Start     Dose/Rate Route Frequency Stop    24 2200  iopamidol (ISOVUE-370) solution 90 mL         90 mL Intravenous ONCE 24 2156    24 1504  iopamidol (ISOVUE-370) solution  Status:  Discontinued           ONCE PRN 24                  Plan:  Start vancomycin  1500 mg IV once now, then initiating intermittent dosing based off levels as patient has elevated Serum creatinine and advanced age.   Vancomycin monitoring method: Trough (Method 2 = manual dose calculation)  Vancomycin therapeutic monitoring goal: 15-20 mg/L  Pharmacy will check vancomycin levels as appropriate in 1-3 Days.    Serum creatinine levels " will be ordered daily for the first week of therapy and at least twice weekly for subsequent weeks.      Aaron Mcgrath RPH

## 2024-08-09 NOTE — TELEPHONE ENCOUNTER
I found that [facility] discharge pharmacy has 100 tabs, will send the refill to them. Pt's  was informed.  
[facility] Call Center    Phone Message    May a detailed message be left on voicemail: yes     Reason for Call: Medication issue- Pharmacy is calling about isosorbide mononitrate (ISMO/MONOKET) 20 MG tablet [[id number]] (Order [id number])/they do not have medication but need an alternative med as soon as possible. Please advise. Call back pharm number- [phone]       Action Taken: Message routed to:  [facility]    Travel Screening: Not Applicable     Date of Service:                                                                    
35 yr old female to ed accomp by parents with c/o headache since thursday with dizziness. Has h/o hydrocephalus with  shunts. Pt awake alert and orientedx3. Denies sob or chest pain. Denies blurry vision. Pt afebrile. Moving all 4 extremities. Responds approp to verbal and tactile stimuli. No facial droop or slurred speech. Pt has h/o gout. Per mother, PT has periods of dementia. Has not been feeling well since increase allopurinol 100mg to 300mg.

## 2024-08-09 NOTE — PLAN OF CARE
Goal Outcome Evaluation:      Plan of Care Reviewed With: patient    Overall Patient Progress: improvingOverall Patient Progress: improving    Outcome Evaluation: Discharge needs are not known at this time.  RNCC will cont to follow plan of care.

## 2024-08-09 NOTE — PROGRESS NOTES
Patient arrived to unit with N/V and 10/10 midsternal chest pain. Cardiology fellow at bedside. EKG x2 and troponin sent. Bedside cardiac US completed. CTA chest ordered and completed. Ativan given for nausea and anxiety with relief. LFTs trending upward- liver US completed. Left 4fr Arterial sheath pulled and patient developed large hematoma. Manual pressure held per RN and MD. LLE US ordered and completed. Cardene gtt started to maintain SBP goal <120- now liberalized to <140 and cardene on hold. AM labs showed lactic acid of 4.0 from 1.6. Second cardiac US completed and LR bolus given. WBC also trending upward. Blood cultures ordered and zosyn given. MRSA/Covid/Resp panel sent and order for pharmacy to dose vanco placed. Minimal output from pericardial drain. Vitally stable. Tmax 99.2. Chest pain decreased with lidocaine patch.

## 2024-08-09 NOTE — CONSULTS
Care Management Initial Consult    General Information  Assessment completed with: Patient, Tessa  Type of CM/SW Visit: Initial Assessment    Primary Care Provider verified and updated as needed: Yes   Readmission within the last 30 days: no previous admission in last 30 days         Advance Care Planning: Advance Care Planning Reviewed: no concerns identified        Communication Assessment  Patient's communication style: spoken language (English or Bilingual)    Hearing Difficulty or Deaf: no   Wear Glasses or Blind: yes    Cognitive  Cognitive/Neuro/Behavioral: WDL  Level of Consciousness: alert  Arousal Level: opens eyes spontaneously  Orientation: oriented x 4     Best Language: 0 - No aphasia  Speech: clear, spontaneous, logical    Living Environment:   People in home: spouse  Ludwin  Current living Arrangements: house      Able to return to prior arrangements: yes     Family/Social Support:  Care provided by: self, spouse/significant other  Provides care for: no one  Marital Status:   , Children  Ludwin       Description of Support System: Supportive, Involved      Current Resources:   Patient receiving home care services: Yes  Skilled Home Care Services: Skilled Nursing, Physical Therapy ([facility])  Community Resources: None  Equipment currently used at home: cane, straight, grab bar, toilet, grab bar, tub/shower, shower chair, walker, rolling, wheelchair, manual  Supplies currently used at home: Incontinence Supplies    Employment/Financial:  Employment Status: retired        Financial Concerns: none   Referral to Financial Worker: No     Does the patient's insurance plan have a 3 day qualifying hospital stay waiver?  No    Lifestyle & Psychosocial Needs:  Social Determinants of Health     Food Insecurity: Low Risk  (11/15/2023)    Food Insecurity     Within the past 12 months, did you worry that your food would run out before you got money to buy more?: No     Within the past 12 months,  did the food you bought just not last and you didn t have money to get more?: No   Depression: Not at risk (7/15/2024)    PHQ-2     PHQ-2 Score: 0   Housing Stability: Low Risk  (11/15/2023)    Housing Stability     Do you have housing? : Yes     Are you worried about losing your housing?: No   Tobacco Use: Medium Risk (8/5/2024)    Patient History     Smoking Tobacco Use: Former     Smokeless Tobacco Use: Never     Passive Exposure: Not on file   Financial Resource Strain: Low Risk  (11/15/2023)    Financial Resource Strain     Within the past 12 months, have you or your family members you live with been unable to get utilities (heat, electricity) when it was really needed?: No   Alcohol Use: Not on file   Transportation Needs: Low Risk  (11/15/2023)    Transportation Needs     Within the past 12 months, has lack of transportation kept you from medical appointments, getting your medicines, non-medical meetings or appointments, work, or from getting things that you need?: No   Physical Activity: Not on file   Interpersonal Safety: Low Risk  (11/15/2023)    Interpersonal Safety     Do you feel physically and emotionally safe where you currently live?: Yes     Within the past 12 months, have you been hit, slapped, kicked or otherwise physically hurt by someone?: No     Within the past 12 months, have you been humiliated or emotionally abused in other ways by your partner or ex-partner?: No   Stress: Not on file   Social Connections: Not on file   Health Literacy: Not on file     Functional Status:  Prior to admission patient needed assistance:   Dependent ADLs:: Ambulation-walker, Wheelchair-independent  Dependent IADLs:: Laundry, Medication Management, Transportation    Mental Health Status:  Mental Health Status: No Current Concerns       Chemical Dependency Status:  Chemical Dependency Status: No Current Concerns        Values/Beliefs:  Spiritual, Cultural Beliefs, Scientology Practices, Values that affect care:                Additional Information:  RNCC met with pt to introduce RNCC role and complete elevated risk readmission assessment.    Pt lives with spouse.  Pt has 4 sons ( they all live locally).  Pt uses walker and w/c for mobility. Pt spouse assist pt with some of her cars and cooking.  Pt stated her son in-law is a nurse and helps her setting up her meds.  Pt receives RN 1X/wk and PT 1X/wk from [facility] home care agency.  Pt stated they are planning to have cleaning service hired to come and help 2X/month.  RNCC discussed about TCU vs home with home care vs home with OP rehab discharge options.  RNCC informed pt that RNCC/SW will cont. to follow the plan of care and assist with the discharge planning.  Pt agreed.  Pt would like to resume the same home care service if home discharge is recommended.  RNCC/SW will cont to follow the plan of care.    Pt would like to use the same home care agency if discharge to home is rec.    [facility]- RN and PT  Phone: [phone]  Fax: [phone]      Geoff Casarez, RN, PHN, BSN  4A and 4E/ ICU  Care Coordinator  Phone: [phone]  Pager: [phone]      SEARCHABLE in [facility] - search CARE COORDINATOR       [place] ([phone]) Saturday & Sunday; ([phone]) [facility] Recognized Holidays     Units: 5A Onc Vocera & 5C Vocera Pager: [phone]    Units: 6B Vocera & 6C Vocera  Pager: [phone]    Units: 7A SOT RNCC Vocera, 7B Med Surg Vocera, & 7C Med Surg Vocera  Pager: [phone]    Units: 6A Vocera & 4A CVICU Vocera, 4C MICU Vocera, and 4E SICU Vocera   Pager: [phone]    Units: 5 Ortho Vocera & 5 Med Surg Vocera  Pager: [phone]    Units: 6 Med Surg Vocera & 8 Med Surg Vocera  Pager [phone]

## 2024-08-09 NOTE — CONSULTS
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   She was admitted following Watchmann device placement followed by pericardial effusion.     Hepatology was consulted for elevated transaminases.     She was admitted to the CVICU following her Watchmann procedure 08/08/2024 due to new onset pericardial effusion. Pericardial drain was placed. She had a brief episode of hypotension with MAP in the low 60s that's when A-line was placed.     # Elevated liver transaminases - most likely secondary to ischemic hepatitis   Presents with hypotensive episode   Labs   Normal baseline LFT in 07/24   >547>499   >282>243       Imaging    US doppler RUQ   Unremarkable right upper quadrant abdominal ultrasound with patent  Doppler evaluation of the liver.    Her elevated liver transaminases can be secondary to hypotensive episode resulting in ischemic hepatitis. Even though, the patient had a brief episode but her vasculopath might exacerbate this compared with general population. She doesn't have background cirrhosis from clinical, biochemical tests and imaging.  Her labs have been gradually getting better after her BP maintained, supporting this diagnosis.   Unlikely drug induced etiology given its rapid onset and no likely culprit.    Recommendations: (to be finalized upon sign)  - Hepatology will sign off but will be available for further questions.   - No acute intervention warranted at this time from hepatology standpoint.  - Trend LFT/ INR daily  - Analgesia/Antiemetics per primary team  - Agree with septic work up and  treatment     Gastroenterology follow up recommendations:     Thank you for involving us in this patient's care. Please do not hesitate to contact the GI service with any questions or concerns.     Pt seen and care plan discussed with Dr. Leventhal, GI staff physician.    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   She was admitted following Watchmann device placement followed by pericardial effusion.     Patient seen and examined at 0800. History is obtained from chart review and patient.    She was admitted to the CVICU following her Watchmann procedure 08/08/2024 due to new onset pericardial effusion. Pericardial drain was placed. She had a brief episode of hypotension with MAP in the low 60s that's when A-line was placed.   She reported that she was vomiting many times with bile emesis yesterday but as of now, she's hemodynamically stable, making well formed stool, no N, V, abdominal pain, fever, chills.     Previous Procedures:  EGD 05/2024:  patchy nodular mucosa in body of stomach which had mild oozing with irritation but unlikely to cause melena.     EGD 07/2023:   - No active bleeding or blood products visualized.   - Normal esophagus.   - Erythematous mucosa in the gastric body.   - Normal duodenal bulb, second portion of the duodenum  and third portion of the duodenum.   Colonoscopy 07/2023:  - One bleeding colonic angiodysplastic lesion in the asending colon. Clips (MR conditional) were placed.   - Many polyps in the ascending and transverse colon,  not removed given GI bleed and her age.   - Diverticulosis in the entire examined colon.                                     Past Medical History:   Reviewed and edited as appropriate  Past Medical History:   Diagnosis Date    CAD (coronary artery disease)     CAD s/p PCI+BMS to MRCA 10/2002, PCI to mLCX 2/2003, PCI+DESx2 to pLAD 11/2007, PCI+DESx1 to dRCA 12/2007, PCI+ALVAREZ to RPDA 7/2013; on indefinite DAPT  10/27/2002    10/27/2002: AMI s/p PCI+BMS (3.5x18mm Bx velocity) to mRCA 2/5/2003: Back pain; PCI+stent to mLCX c/b distal embolization/slow flow 4/11/2003: Unstable angina; PCI+stent (Hepacoat velocity) to mLAD 11/27/2007: PCI+DESx2 to pLAD (IVUS w/ calcification of LMCA and ulcerated plaque pLAD, 80% dRCA; also had 80% dLCX, too small to stent) 12/11/2007: Staged PCI. PCI+DESx1 (3.5x13mm Cypher) to dRCA (indefinite DAPT recommended at this time) 6/27/2008: Angina. mLCX stent 70% ISR. LAD and RCA stents patent. Myocardium at risk from LCX felt to be small, medical management preferred to PCI. 11/10/2009: Angina. Findings unchanged from 6/27/2008, FFR LAD 0.90. Medical management recommended. 7/23/2013: Unstable angina; PCI+ALVAREZ to mPDA. Diagnostic findings: LMCA 40% distal. LAD: pLAD stents patent mLAD 30% ISR dLAD 50% diffuse, D2 diffuse disease. LCX 80% mid ISR. RCA diffuse <30% mid, RPLAs diffuse disease, RPDA 100% occlusion.      Cardiac Pacemaker- [employer]tronic, dual chamber- NOT dependant 11/28/2007    CKD (chronic kidney disease), stage IV (H)     Hyperlipidemia LDL goal <70     Hypertension     Stented coronary artery 10-    RCA    Stented coronary artery 2-5-2003    LCx    Stented coronary artery 4-    LAD    Stented coronary artery 11-    LAD    Stented coronary artery 12-    RCA    Transient complete heart block (H) 11/28/2007            Past Surgical History:   Reviewed and edited as appropriate   Past Surgical History:   Procedure Laterality Date    CHOLECYSTECTOMY      CV  CORONARY ANGIOGRAM N/A 6/17/2022    Procedure: Coronary Angiogram;  Surgeon: Constantine Macias MD;  Location:  HEART CARDIAC CATH LAB    CV CORONARY ANGIOGRAM N/A 7/17/2023    Procedure: Coronary Angiogram;  Surgeon: Constantine Macias MD;  Location:  HEART CARDIAC CATH LAB    CV PCI N/A 6/17/2022    Procedure: Percutaneous Coronary Intervention;  Surgeon: Constantine Macias MD;  Location:  HEART CARDIAC CATH LAB    CV PCI N/A 7/17/2023    Procedure: Percutaneous Coronary Intervention;  Surgeon: Constantine Macias MD;  Location:  HEART CARDIAC CATH LAB    CV PCI N/A 11/6/2023    Procedure: Percutaneous Coronary Intervention;  Surgeon: Constantine Macias MD;  Location:  HEART CARDIAC CATH LAB    CV RIGHT HEART CATH MEASUREMENTS RECORDED N/A 6/17/2022    Procedure: Right Heart Catheterization;  Surgeon: Constantine Macias MD;  Location:  HEART CARDIAC CATH LAB    EP PACEMAKER N/A 10/15/2019    Procedure: EP PACEMAKER;  Surgeon: Anthony Zhu MD;  Location: [facility] CARDIAC CATH LAB    ESOPHAGOSCOPY, GASTROSCOPY, DUODENOSCOPY (EGD), COMBINED N/A 7/20/2023    Procedure: Esophagoscopy, gastroscopy, duodenoscopy (EGD), combined;  Surgeon: Bekah Dash DO;  Location:  GI    ESOPHAGOSCOPY, GASTROSCOPY, DUODENOSCOPY (EGD), COMBINED N/A 5/2/2024    Procedure: Esophagoscopy, gastroscopy, duodenoscopy (EGD), combined;  Surgeon: Tavo Donaldson MD;  Location:  GI    HC PPM INSERTION NEW/REPLACEMENT W/ ATRIAL&VENTRICULAR LEAD  11-    HYSTERECTOMY      LAPAROTOMY EXPLORATORY N/A 4/13/2024    Procedure: Laparotomy exploratory, Wound Vac Placement.;  Surgeon: Anthony Trammell MD;  Location: [facility] OR    LAPAROTOMY EXPLORATORY N/A 4/14/2024    Procedure: Abdominal Re-Exploration and Closure;  Surgeon: Anthony Trammell MD;  Location: [facility] OR    LAPAROTOMY EXPLORATORY N/A 4/13/2024    Procedure: Exploratory Laparotomy;  Surgeon: Anthony Trammell MD;  Location:  "[facility] OR              Social History:   The patient lives with   Employer: retired    Alcohol: none  Tobacco: none  Illicit drugs: none         Family History:   Patient's family history is reviewed today and is non-contributory  No family history of cirrhosis, liver disease or HCC   Family History   Problem Relation Age of Onset    Cancer Sister 82        bladder cancer             Allergies:   Reviewed and edited as appropriate     Allergies   Allergen Reactions    Blood Transfusion Related (Informational Only) Other (See Comments)     Patient has a history of a clinically significant antibody against RBC antigens.  A delay in compatible RBCs may occur. Unidentified antibody found on 8/8/24 at [facility].    Codeine Sulfate Itching    Shrimp Swelling    Oxycodone Other (See Comments)    Shrimp Extract Swelling    Bactrim [Sulfamethoxazole W/Trimethoprim]      Patient unable to recall    Biaxin [Clarithromycin]     Chlorthalidone Nausea and Vomiting    Clonidine     Darvon [Propoxyphene Hcl]     Dilaudid [Hydromorphone] Visual Disturbance and Hallucination     Tolerated in April 2024 hospital admissions    Gabapentin Fatigue and Confusion     \"felt drunk\"    Indomethacin     Levaquin [Levofloxacin Hemihydrate]     Morphine Sulfate     Percocet [Oxycodone-Acetaminophen] Hallucination    Pregabalin Itching and Fatigue    Simvastatin Palpitations     Muscle weakness, leg cramping      Spironolactone      Dehydrated      Terazosin             Medications:     Current Facility-Administered Medications   Medication Dose Route Frequency Provider Last Rate Last Admin    acetaminophen (TYLENOL) tablet 650 mg  650 mg Oral Q4H PRN Randa Ann NP   650 mg at 08/08/24 2348    aspirin EC tablet 81 mg  81 mg Oral Daily Randa Ann NP   81 mg at 08/09/24 0828    clopidogrel (PLAVIX) tablet 75 mg  75 mg Oral Daily Kenneth Friedman MD   75 mg at 08/09/24 0828    HOLD:  Metformin and metformin containing " medications if patient received IV contrast with acute kidney injury or severe chronic kidney disease (stage IV or stage V; i.e., eGFR less than 30)   Does not apply HOLD Randa Ann NP        Lidocaine (LIDOCARE) 4 % Patch 1 patch  1 patch Transdermal Q24H Liss Sidhu MD   1 patch at 08/09/24 0335    ondansetron (ZOFRAN ODT) ODT tab 4 mg  4 mg Oral Q6H PRN Randa Ann NP   4 mg at 08/09/24 0835    Or    ondansetron (ZOFRAN) injection 4 mg  4 mg Intravenous Q6H PRN Randa Ann NP                 Review of Systems:     A complete review of systems was performed and is negative except as noted in the HPI           Physical Exam:   Temp: 97.4  F (36.3  C) Temp src: Axillary BP: 105/77 Pulse: 70   Resp: 15 SpO2: 95 % O2 Device: None (Room air) Oxygen Delivery: 2 LPM  Wt:   Wt Readings from Last 2 Encounters:   08/08/24 66.7 kg (147 lb 0.8 oz)   08/05/24 67.9 kg (149 lb 11.2 oz)        General: an elderly female in NAD.  Answers appropriately.    HEENT: Head is AT/NC. Sclera anicteric. No conjunctival injection.  Oropharynx is clear, moist and w/o exudate or lesions. No thrush. Good dentition.  Lungs: Non-labored breathing on RA. Clear to auscultation bilaterally.  No wheezes, rhonchi or crackles.  Heart: Regular rate and rhythm.  No murmurs, gallops or rubs.  Normal S1 and S2.  Abdomen: Soft, non-tender, non-distended.  No rebound or peritoneal signs. BS +.  No hepatosplenomegaly. No overt sx ascites.  Extremities: WWP, no pedal edema.  MSK: no gross deformity, muscle wasting  Skin: No jaundice or rash  Neurologic: Grossly non-focal.  CN 2-12 grossly intact.   Psych: mood appropriate to situation           Data:   Labs and imaging below were independently reviewed and interpreted    LAB WORK:    BMP  Recent Labs   Lab 08/09/24  0412 08/09/24  0022 08/09/24  0019 08/08/24  1811 08/08/24  1421 08/08/24  0651     --  144 144 143 144   POTASSIUM 4.3  --  4.4 4.5 3.5 4.0   CHLORIDE  112*  --  115* 116*  --  111*   ALEXANDRO 8.1*  --  8.1* 7.8*  --  8.8   CO2 14*  --  16* 17*  --  22   BUN 45.0*  --  47.0* 45.9*  --  52.2*   CR 1.76*  --  1.75* 1.70*  --  1.71*   * 165* 164* 122* 74 84     CBC  Recent Labs   Lab 08/09/24  0412 08/09/24  0019 08/08/24  1421 08/08/24  0651 08/05/24  1306   WBC 17.4* 14.8*  --  6.4 6.9   RBC 3.08* 3.13*  --  3.00* 3.13*   HGB 9.0* 9.3*   < > 8.8* 9.0*   HCT 29.2* 30.0*  --  29.1* 30.1*   MCV 95 96  --  97 96   MCH 29.2 29.7  --  29.3 28.8   MCHC 30.8* 31.0*  --  30.2* 29.9*   RDW 17.6* 17.3*  --  17.2* 16.7*   PLT 92* 91*  --  99* 95*    < > = values in this interval not displayed.     INR  Recent Labs   Lab 08/09/24  0019 08/08/24  0651 08/05/24  1306   INR 1.31* 1.14 1.22*     LFTs  Recent Labs   Lab 08/09/24  0621 08/09/24  0019 08/08/24  1811 08/05/24  1305   ALKPHOS 116 133 107  --    * 547* 244*  --    * 282* 137*  --    BILITOTAL 0.4 0.8 0.7  --    PROTTOTAL 5.6* 6.1* 5.6*  --    ALBUMIN 3.4* 3.7 3.4* 4.1      PANCNo lab results found in last 7 days.    IMAGING:  (Personally reviewed)  Narrative & Impression   EXAMINATION: US ABDOMEN LIMITED W ABDOMEN DOPPLER LIMITED 8/9/2024  3:19 AM      COMPARISON: CT 7/3/2024, 6/1/2024     HISTORY: hepatocellular liver injury, c/f arterial clot vs venous     TECHNIQUE: The abdomen was scanned in standard fashion with  specialized ultrasound transducer(s) using both gray-scale, color  Doppler, and spectral flow techniques.     Findings:  Liver: The liver demonstrates normal homogeneous echotexture. No  evidence of a focal hepatic mass.      Extrahepatic portal vein flow is antegrade at 23 cm/s.  Right portal vein flow is antegrade, measuring 28 cm/s.  Left portal vein flow is antegrade, measuring 13 cm/s.     Flow in the hepatic artery is towards the liver and:  64 cm/s peak systolic  0.64 resistive index.      The splenic vein is not well-visualized due to overlying line  placement.  The left, middle, and  right hepatic veins are patent with  flow towards the IVC. The IVC is patent with flow towards the heart.    The visualized aorta is not dilated.     Gallbladder: Surgically absent.      Bile Ducts: No intrahepatic biliary duct dilation. The common bile  duct measures up to 9 mm, stable compared to prior likely  postcholecystectomy reservoir effect.     Pancreas: Not well visualized due to overlying line.      Right kidney: Normal grayscale appearance. No hydronephrosis or  suspicious masses. The kidney measures up to 11.1 cm.     Fluid: No evidence of ascites or pleural effusions.                                                                      Impression:   Unremarkable right upper quadrant abdominal ultrasound with patent  Doppler evaluation of the liver.           =======================================================================

## 2024-08-09 NOTE — PROGRESS NOTES
DASH Diet: Care Instructions  Your Care Instructions     The DASH diet is an eating plan that can help lower your blood pressure. DASH stands for Dietary Approaches to Stop Hypertension. Hypertension is high blood pressure. The DASH diet focuses on eating foods that are high in calcium, potassium, and magnesium. These nutrients can lower blood pressure. The foods that are highest in these nutrients are fruits, vegetables, low-fat dairy products, nuts, seeds, and legumes. But taking calcium, potassium, and magnesium supplements instead of eating foods that are high in those nutrients does not have the same effect. The DASH diet also includes whole grains, fish, and poultry. The DASH diet is one of several lifestyle changes your doctor may recommend to lower your high blood pressure. Your doctor may also want you to decrease the amount of sodium in your diet. Lowering sodium while following the DASH diet can lower blood pressure even further than just the DASH diet alone. Follow-up care is a key part of your treatment and safety. Be sure to make and go to all appointments, and call your doctor if you are having problems. It's also a good idea to know your test results and keep a list of the medicines you take. How can you care for yourself at home? Following the DASH diet  · Eat 4 to 5 servings of fruit each day. A serving is 1 medium-sized piece of fruit, ½ cup chopped or canned fruit, 1/4 cup dried fruit, or 4 ounces (½ cup) of fruit juice. Choose fruit more often than fruit juice. · Eat 4 to 5 servings of vegetables each day. A serving is 1 cup of lettuce or raw leafy vegetables, ½ cup of chopped or cooked vegetables, or 4 ounces (½ cup) of vegetable juice. Choose vegetables more often than vegetable juice. · Get 2 to 3 servings of low-fat and fat-free dairy each day. A serving is 8 ounces of milk, 1 cup of yogurt, or 1 ½ ounces of cheese. · Eat 6 to 8 servings of grains each day.  A serving is 1 slice I was called by [facility] regarding elevation of ALT/AST of this 89 yo pt who admitted for pericardial effusion after watchman device s/p pericardial drain. ALT/AST initially 100/200 now 280/570 with normal Tbili and slight elevation of INR 1.3. 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     Question by ICU is whether her LFT abnormalities are due to acute mesenteric ischemia from celiac truck. She does not have abdominal pain out of proportion to exam and normal lactate. 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     Review of her vitals showed BP of 87/50 on 8/8 presumably from reduced cardiac output from tamponade physiology from pericardial effusion. This transient hypoperfusion may have caused liver injury. DILI is possible from Abx but will not expect such a rapid rise.    Recommendations:  - Hepatitis A/B/C serology to r/o acute hepatitis  - Liver US with doppler   - Avoid hypotension   - Continue to trend LFT/INR      Ronda Medel MD PhD  GI Fellow [facility] 6  [phone]           of bread, 1 ounce of dry cereal, or ½ cup of cooked rice, pasta, or cooked cereal. Try to choose whole-grain products as much as possible. · Limit lean meat, poultry, and fish to 2 servings each day. A serving is 3 ounces, about the size of a deck of cards. · Eat 4 to 5 servings of nuts, seeds, and legumes (cooked dried beans, lentils, and split peas) each week. A serving is 1/3 cup of nuts, 2 tablespoons of seeds, or ½ cup of cooked beans or peas. · Limit fats and oils to 2 to 3 servings each day. A serving is 1 teaspoon of vegetable oil or 2 tablespoons of salad dressing. · Limit sweets and added sugars to 5 servings or less a week. A serving is 1 tablespoon jelly or jam, ½ cup sorbet, or 1 cup of lemonade. · Eat less than 2,300 milligrams (mg) of sodium a day. If you limit your sodium to 1,500 mg a day, you can lower your blood pressure even more. · Be aware that all of these are the suggested number of servings for people who eat 1,800 to 2,000 calories a day. Your recommended number of servings may be different if you need more or fewer calories. Tips for success  · Start small. Do not try to make dramatic changes to your diet all at once. You might feel that you are missing out on your favorite foods and then be more likely to not follow the plan. Make small changes, and stick with them. Once those changes become habit, add a few more changes. · Try some of the following:  ? Make it a goal to eat a fruit or vegetable at every meal and at snacks. This will make it easy to get the recommended amount of fruits and vegetables each day. ? Try yogurt topped with fruit and nuts for a snack or healthy dessert. ? Add lettuce, tomato, cucumber, and onion to sandwiches. ? Combine a ready-made pizza crust with low-fat mozzarella cheese and lots of vegetable toppings. Try using tomatoes, squash, spinach, broccoli, carrots, cauliflower, and onions. ?  Have a variety of cut-up vegetables with a low-fat dip as an appetizer instead of chips and dip. ? Sprinkle sunflower seeds or chopped almonds over salads. Or try adding chopped walnuts or almonds to cooked vegetables. ? Try some vegetarian meals using beans and peas. Add garbanzo or kidney beans to salads. Make burritos and tacos with mashed henley beans or black beans. Where can you learn more? Go to http://www.costa.com/  Enter H967 in the search box to learn more about \"DASH Diet: Care Instructions. \"  Current as of: April 29, 2021               Content Version: 13.0  © 5855-8997 [employer]. Care instructions adapted under license by [employer] (which disclaims liability or warranty for this information). If you have questions about a medical condition or this instruction, always ask your healthcare professional. [address] any warranty or liability for your use of this information.

## 2024-08-09 NOTE — PLAN OF CARE
Goal Outcome Evaluation:      Plan of Care Reviewed With: patient    Overall Patient Progress: improvingOverall Patient Progress: improving    Outcome Evaluation: Medically cleared for lower level of care    Major Shift Events:  No acute events. Lactate and LFTs improving. Vitals stable and WDL. Chest tube clamped at 1300 per order. A line removed    Plan: Transferred to: Unit 6C  at 1600  Belongings: Clothing, glasses, walker sent with patient  Bonner removed? Yes  Central line removed? Yes  Chart and medications sent with patient Yes  Family notified: Yes,  [place] notified    For vital signs and complete assessments, please see documentation flowsheets.

## 2024-08-10 ENCOUNTER — APPOINTMENT (OUTPATIENT)
Dept: OCCUPATIONAL THERAPY | Facility: CLINIC | Age: 89
DRG: 274 | End: 2024-08-10
Attending: INTERNAL MEDICINE
Payer: COMMERCIAL

## 2024-08-10 ENCOUNTER — APPOINTMENT (OUTPATIENT)
Dept: CARDIOLOGY | Facility: CLINIC | Age: 89
DRG: 274 | End: 2024-08-10
Attending: INTERNAL MEDICINE
Payer: COMMERCIAL

## 2024-08-10 LAB
ALBUMIN SERPL BCG-MCNC: 3.4 G/DL (ref 3.5–5.2)
ALBUMIN SERPL BCG-MCNC: 3.4 G/DL (ref 3.5–5.2)
ALP SERPL-CCNC: 119 U/L (ref 40–150)
ALP SERPL-CCNC: 124 U/L (ref 40–150)
ALT SERPL W P-5'-P-CCNC: 145 U/L (ref 0–50)
ALT SERPL W P-5'-P-CCNC: 201 U/L (ref 0–50)
ANION GAP SERPL CALCULATED.3IONS-SCNC: 11 MMOL/L (ref 7–15)
ANION GAP SERPL CALCULATED.3IONS-SCNC: 12 MMOL/L (ref 7–15)
AST SERPL W P-5'-P-CCNC: 219 U/L (ref 0–45)
AST SERPL W P-5'-P-CCNC: 334 U/L (ref 0–45)
BILIRUB SERPL-MCNC: 0.3 MG/DL
BILIRUB SERPL-MCNC: 0.4 MG/DL
BUN SERPL-MCNC: 51.9 MG/DL (ref 8–23)
BUN SERPL-MCNC: 54.5 MG/DL (ref 8–23)
CALCIUM SERPL-MCNC: 7.8 MG/DL (ref 8.8–10.4)
CALCIUM SERPL-MCNC: 8.2 MG/DL (ref 8.8–10.4)
CHLORIDE SERPL-SCNC: 109 MMOL/L (ref 98–107)
CHLORIDE SERPL-SCNC: 111 MMOL/L (ref 98–107)
CREAT SERPL-MCNC: 2.19 MG/DL (ref 0.51–0.95)
CREAT SERPL-MCNC: 2.3 MG/DL (ref 0.51–0.95)
EGFRCR SERPLBLD CKD-EPI 2021: 20 ML/MIN/1.73M2
EGFRCR SERPLBLD CKD-EPI 2021: 21 ML/MIN/1.73M2
ERYTHROCYTE [DISTWIDTH] IN BLOOD BY AUTOMATED COUNT: 18.6 % (ref 10–15)
GLUCOSE SERPL-MCNC: 131 MG/DL (ref 70–99)
GLUCOSE SERPL-MCNC: 97 MG/DL (ref 70–99)
HCO3 SERPL-SCNC: 18 MMOL/L (ref 22–29)
HCO3 SERPL-SCNC: 18 MMOL/L (ref 22–29)
HCT VFR BLD AUTO: 29.5 % (ref 35–47)
HGB BLD-MCNC: 9 G/DL (ref 11.7–15.7)
INR PPP: 1.43 (ref 0.85–1.15)
LACTATE SERPL-SCNC: 1 MMOL/L (ref 0.7–2)
LACTATE SERPL-SCNC: 1.3 MMOL/L (ref 0.7–2)
LACTATE SERPL-SCNC: 1.6 MMOL/L (ref 0.7–2)
LACTATE SERPL-SCNC: 1.9 MMOL/L (ref 0.7–2)
LVEF ECHO: NORMAL
MAGNESIUM SERPL-MCNC: 2.2 MG/DL (ref 1.7–2.3)
MCH RBC QN AUTO: 29.5 PG (ref 26.5–33)
MCHC RBC AUTO-ENTMCNC: 30.5 G/DL (ref 31.5–36.5)
MCV RBC AUTO: 97 FL (ref 78–100)
PLATELET # BLD AUTO: 92 10E3/UL (ref 150–450)
POTASSIUM SERPL-SCNC: 4.5 MMOL/L (ref 3.4–5.3)
POTASSIUM SERPL-SCNC: 4.6 MMOL/L (ref 3.4–5.3)
PROT SERPL-MCNC: 5.7 G/DL (ref 6.4–8.3)
PROT SERPL-MCNC: 5.8 G/DL (ref 6.4–8.3)
RBC # BLD AUTO: 3.05 10E6/UL (ref 3.8–5.2)
SODIUM SERPL-SCNC: 139 MMOL/L (ref 135–145)
SODIUM SERPL-SCNC: 140 MMOL/L (ref 135–145)
WBC # BLD AUTO: 17.1 10E3/UL (ref 4–11)

## 2024-08-10 PROCEDURE — 83605 ASSAY OF LACTIC ACID: CPT | Performed by: INTERNAL MEDICINE

## 2024-08-10 PROCEDURE — 36415 COLL VENOUS BLD VENIPUNCTURE: CPT | Performed by: INTERNAL MEDICINE

## 2024-08-10 PROCEDURE — 93321 DOPPLER ECHO F-UP/LMTD STD: CPT | Mod: 26 | Performed by: INTERNAL MEDICINE

## 2024-08-10 PROCEDURE — 99233 SBSQ HOSP IP/OBS HIGH 50: CPT

## 2024-08-10 PROCEDURE — 97530 THERAPEUTIC ACTIVITIES: CPT | Mod: GO

## 2024-08-10 PROCEDURE — 85610 PROTHROMBIN TIME: CPT | Performed by: INTERNAL MEDICINE

## 2024-08-10 PROCEDURE — 93308 TTE F-UP OR LMTD: CPT | Mod: 26 | Performed by: INTERNAL MEDICINE

## 2024-08-10 PROCEDURE — 250N000013 HC RX MED GY IP 250 OP 250 PS 637

## 2024-08-10 PROCEDURE — 97535 SELF CARE MNGMENT TRAINING: CPT | Mod: GO

## 2024-08-10 PROCEDURE — 83735 ASSAY OF MAGNESIUM: CPT | Performed by: INTERNAL MEDICINE

## 2024-08-10 PROCEDURE — 250N000013 HC RX MED GY IP 250 OP 250 PS 637: Performed by: INTERNAL MEDICINE

## 2024-08-10 PROCEDURE — 250N000013 HC RX MED GY IP 250 OP 250 PS 637: Performed by: STUDENT IN AN ORGANIZED HEALTH CARE EDUCATION/TRAINING PROGRAM

## 2024-08-10 PROCEDURE — 93308 TTE F-UP OR LMTD: CPT

## 2024-08-10 PROCEDURE — 84460 ALANINE AMINO (ALT) (SGPT): CPT | Performed by: INTERNAL MEDICINE

## 2024-08-10 PROCEDURE — 250N000013 HC RX MED GY IP 250 OP 250 PS 637: Performed by: NURSE PRACTITIONER

## 2024-08-10 PROCEDURE — 85027 COMPLETE CBC AUTOMATED: CPT | Performed by: INTERNAL MEDICINE

## 2024-08-10 PROCEDURE — 82040 ASSAY OF SERUM ALBUMIN: CPT | Performed by: INTERNAL MEDICINE

## 2024-08-10 PROCEDURE — 250N000011 HC RX IP 250 OP 636

## 2024-08-10 PROCEDURE — 97165 OT EVAL LOW COMPLEX 30 MIN: CPT | Mod: GO

## 2024-08-10 PROCEDURE — 120N000003 HC R&B IMCU UMMC

## 2024-08-10 RX ORDER — NALOXONE HYDROCHLORIDE 0.4 MG/ML
0.4 INJECTION, SOLUTION INTRAMUSCULAR; INTRAVENOUS; SUBCUTANEOUS
Status: DISCONTINUED | OUTPATIENT
Start: 2024-08-10 | End: 2024-08-15 | Stop reason: HOSPADM

## 2024-08-10 RX ORDER — NALOXONE HYDROCHLORIDE 0.4 MG/ML
0.2 INJECTION, SOLUTION INTRAMUSCULAR; INTRAVENOUS; SUBCUTANEOUS
Status: DISCONTINUED | OUTPATIENT
Start: 2024-08-10 | End: 2024-08-15 | Stop reason: HOSPADM

## 2024-08-10 RX ORDER — FUROSEMIDE 10 MG/ML
20 INJECTION INTRAMUSCULAR; INTRAVENOUS ONCE
Status: COMPLETED | OUTPATIENT
Start: 2024-08-10 | End: 2024-08-10

## 2024-08-10 RX ORDER — ISOSORBIDE MONONITRATE 20 MG/1
60 TABLET ORAL 2 TIMES DAILY
Status: DISCONTINUED | OUTPATIENT
Start: 2024-08-10 | End: 2024-08-13

## 2024-08-10 RX ADMIN — METHOCARBAMOL 500 MG: 500 TABLET ORAL at 09:36

## 2024-08-10 RX ADMIN — CLOPIDOGREL BISULFATE 75 MG: 75 TABLET ORAL at 08:05

## 2024-08-10 RX ADMIN — ALLOPURINOL 100 MG: 100 TABLET ORAL at 08:05

## 2024-08-10 RX ADMIN — TRAMADOL HYDROCHLORIDE 25 MG: 50 TABLET, COATED ORAL at 18:28

## 2024-08-10 RX ADMIN — METHOCARBAMOL 500 MG: 500 TABLET ORAL at 16:35

## 2024-08-10 RX ADMIN — TIMOLOL MALEATE 1 DROP: 5 SOLUTION/ DROPS OPHTHALMIC at 08:01

## 2024-08-10 RX ADMIN — FUROSEMIDE 20 MG: 10 INJECTION, SOLUTION INTRAMUSCULAR; INTRAVENOUS at 14:42

## 2024-08-10 RX ADMIN — ACETAMINOPHEN 650 MG: 325 TABLET, FILM COATED ORAL at 16:35

## 2024-08-10 RX ADMIN — ASPIRIN 81 MG: 81 TABLET ORAL at 08:05

## 2024-08-10 RX ADMIN — ACETAMINOPHEN 650 MG: 325 TABLET, FILM COATED ORAL at 09:36

## 2024-08-10 RX ADMIN — CYANOCOBALAMIN TAB 500 MCG 500 MCG: 500 TAB at 08:05

## 2024-08-10 RX ADMIN — PANTOPRAZOLE SODIUM 40 MG: 40 TABLET, DELAYED RELEASE ORAL at 20:56

## 2024-08-10 RX ADMIN — PANTOPRAZOLE SODIUM 40 MG: 40 TABLET, DELAYED RELEASE ORAL at 08:05

## 2024-08-10 RX ADMIN — ISOSORBIDE MONONITRATE 60 MG: 20 TABLET ORAL at 20:59

## 2024-08-10 RX ADMIN — LIDOCAINE 1 PATCH: 4 PATCH TOPICAL at 03:54

## 2024-08-10 ASSESSMENT — ACTIVITIES OF DAILY LIVING (ADL)
ADLS_ACUITY_SCORE: 29
PREVIOUS_RESPONSIBILITIES: MEAL PREP;HOUSEKEEPING;LAUNDRY;SHOPPING;YARDWORK;MEDICATION MANAGEMENT;DRIVING
ADLS_ACUITY_SCORE: 29
ADLS_ACUITY_SCORE: 31
ADLS_ACUITY_SCORE: 29

## 2024-08-10 NOTE — PLAN OF CARE
Goal Outcome Evaluation:    NURSING PROGRESS NOTE  Shift Summary      Date: August 10, 2024     Neuro/Musculoskeletal:  A&Ox4. Able to make needs known.  Cardiac:  SR.  VSS.     Respiratory:  Sating in the 90s on RA .  GI/:  Adequate urine output.  LBM: 8/9  Diet/Appetite:  Tolerating regular diet.  Activity:  Assist of 1 with ambulation   Pain:  Present, rated pain 4-5/10, tylenol given  Skin:  No new deficits noted.   LDAs + Drips/IVF:  R +L PIV SL  Protocols/Labs:    Lactic 2.6, LR 500cc bolus given  Pertinent Shift Updates:    Pt rest well during the night, stable, no significant changes noted    Plan:    NPO at midnight  Echo in AM  Daily Chest X-ray  Kelly Rob RN  .................................................... August 10, 2024   3:20 AM  [facility] ([facility]): [facility] ICU (Unit 6C)

## 2024-08-10 NOTE — PLAN OF CARE
Goal Outcome Evaluation:    Shift [id number]    Neuro: A/O x 4. Able to make needs known. Afebrile.  Cardiac:  SR. VSS.   Respiratory:  O2 saturation > 92% on RA.   GI/:  Adequate UO; uses bathroom. LBM 8/10 per pt.  Diet/appetite:  Fair appetite on regular diet.  Activity:  SBA with 4WW from home.   Pain:  C/o pain in chest, worse with movement. Pain 2/10 at rest; 9/10 post-PT. Given PRN APAP and Robaxin x1, with effect  Skin: See PCS.  LDA's: PIV x2, upper left and right arms.   Plan: Echo completed. CT clamped. Continue with POC and notify primary team with changes.

## 2024-08-10 NOTE — PLAN OF CARE
[id number]    D/I: presents to the CICU following a Watchman device placement.     A&Ox4. C/o pain on R side chest radiated to back rated 8 out of 10 after up to the bathroom & deep inspiration. Managed with PRN tylenol, robaxin, tramadol x1 & hot packs. SR with occasionally A-paced with HR of 80s. On RA. On regular diet with good appetite. LBM 8/10. Up ad court with walker from home and SBA. R & L groin site CDI.     Drain: pericardial drain clamped & not to suction per order.     IV: L PIV & R PIV.     P: chest pain management. Notify Card CSI with changes.

## 2024-08-10 NOTE — PROGRESS NOTES
"   08/10/24 0920   Appointment Info   Signing Clinician's Name / Credentials (OT) Renu Graff OTR/L   Living Environment   People in Home spouse   Current Living Arrangements house   Home Accessibility stairs to enter home;stairs within home   Number of Stairs, Main Entrance 2  (from garage, 0 through front door)   Stair Railings, Main Entrance none   Number of Stairs, Within Home, Primary seven  (7 up, 7 down)   Stair Railings, Within Home, Primary railings on both sides of stairs   Transportation Anticipated family or friend will provide   Living Environment Comments Pt lives with spouse in multi level house. 2 DAVEY from garage (pt preferred access), no handrail. 0 DAVEY from front door. 7 steps up, 7 down in home. Bedroom/BR on upper level. Has tub/shower with shower chair and grab bars, and raised toilet seat.   Self-Care   Usual Activity Tolerance good   Current Activity Tolerance poor   Regular Exercise Yes   Activity/Exercise Type walking;other (see comments)  (PT 1x/week)   Exercise Amount/Frequency 3-5 times/wk   Equipment Currently Used at Home cane, straight;grab bar, toilet;grab bar, tub/shower;shower chair;walker, rolling;wheelchair, manual;walker, standard   Fall history within last six months no   Activity/Exercise/Self-Care Comment Pt reports IND in ADLs prior to admission   Instrumental Activities of Daily Living (IADL)   Previous Responsibilities meal prep;housekeeping;laundry;shopping;yardwork;medication management;driving   IADL Comments Spouse assists with cleaning, cooking, laundry, transportation. RN comes 1x/week, has meds set up from dtr-in-law or son who is an RN monthly.   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   She was admitted following Watchmann device placement followed by pericardial effusion\"   Left Upper Extremity (Weight-bearing Status) full weight-bearing (FWB)   Right Upper Extremity (Weight-bearing Status) full weight-bearing (FWB)   Left Lower Extremity (Weight-bearing Status) full weight-bearing (FWB)   Right Lower Extremity (Weight-bearing Status) full weight-bearing (FWB)   General Observations and Info Activity: ambulate   Cognitive Status Examination   Orientation Status orientation to person, place and time   Cognitive Status Comments no concerns, following all commands, A&Ox4   Visual Perception   Visual Impairment/Limitations WFL;corrective lenses full-time   Sensory   Sensory Comments BLE neuropathy baseline   Posture   Posture forward head position   Range of Motion Comprehensive   General Range of Motion bilateral upper extremity ROM WFL   Strength Comprehensive (MMT)   Comment, General Manual Muscle Testing (MMT) Assessment general weakness, WFL   Coordination   Upper Extremity Coordination No deficits were identified   Bed Mobility   Bed Mobility supine-sit   Supine-Sit [place] (Bed Mobility) contact guard   Comment (Bed Mobility) inc effort d/t pain, using handrail   Transfers   Transfers sit-stand transfer   Sit-Stand Transfer   Sit-Stand [place] (Transfers) contact guard   Assistive Device (Sit-Stand Transfers) walker, 4-wheeled   Balance   Balance Assessment standing dynamic balance   Standing Balance: Dynamic contact guard   Position/Device Used, Standing Balance walker, 4-wheeled   Activities of Daily Living   BADL Assessment/Intervention bathing;lower body dressing;grooming;toileting   Bathing Assessment/Intervention   [place] Level (Bathing) minimum assist (75% patient effort)   Comment, (Bathing) per clinical judgement   Lower Body Dressing " Assessment/Training   Comment, (Lower Body Dressing) per clinical judgement   [place] Level (Lower Body Dressing) minimum assist (75% patient effort)   Grooming Assessment/Training   [place] Level (Grooming) supervision;set up   Comment, (Grooming) g/h tasks standing at sink   Toileting   Comment, (Toileting) per clinical judgement   [place] Level (Toileting) minimum assist (75% patient effort)   Clinical Impression   Criteria for Skilled Therapeutic Interventions Met (OT) Yes, treatment indicated   OT Diagnosis Dec IND in I/ADLs   OT Problem List-Impairments impacting ADL problems related to;activity tolerance impaired;strength;pain   Assessment of Occupational Performance 1-3 Performance Deficits   Identified Performance Deficits activity tolerance for ADLs and mobility   Planned Therapy Interventions (OT) ADL retraining;strengthening;home program guidelines;progressive activity/exercise;transfer training   Clinical Decision Making Complexity (OT) problem focused assessment/low complexity   Risk & Benefits of therapy have been explained care plan/treatment goals reviewed;evaluation/treatment results reviewed;risks/benefits reviewed;current/potential barriers reviewed;participants voiced agreement with care plan;participants included;spouse/significant other;patient   Clinical Impression Comments Pt appears below functional baseline. Would benefit from continued therapy to promote strength and safety in I/ADLs.   OT Total Evaluation Time   OT Eval, Low Complexity Minutes (22333) 9   OT Goals   Therapy Frequency (OT) 6 times/week   OT Predicted Duration/Target Date for Goal Attainment 08/24/24   OT Goals Hygiene/Grooming;Lower Body Dressing;Lower Body Bathing;Transfers;Toilet Transfer/Toileting;Aerobic Activity   OT: Hygiene/Grooming modified independent   OT: Lower Body Dressing Modified independent   OT: Lower Body Bathing Modified independent   OT: Transfer Modified independent  (tub transfer)    OT: Toilet Transfer/Toileting Modified independent   OT: Perform aerobic activity with stable cardiovascular response continuous activity;10 minutes;ambulation   OT Discharge Planning   OT Plan screen PT, functional mobility in marshall, standing ADLs, LB dressing, toilet transfer   OT Discharge Recommendation (DC Rec) home with assist   OT Rationale for DC Rec Pt appears below functional baseline d/t pain management decreasing activity tolerance this date. Anticipate with pain management and progress, pt will be safe for return home with assist from family. Will continue to monitor and update recs as appropriate   OT Brief overview of current status CGA w/ FWW   Total Session Time   Timed Code Treatment Minutes 32   Total Session Time (sum of timed and untimed services) 41

## 2024-08-10 NOTE — PROGRESS NOTES
Interventional Cardiology Progress Note      Assessment & Plan:  Tessa Mansfield is a 89 year old female who presents to the CICU following a Watchman device placement. 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 Admitted to the CICU due to development of a pericardial effusion following WATCHMAN placement. Because subsequent TTE suggested worsening of effusion, patient underwent pericardiocentesis on 8/8 w/ 220 mL and drain placement.     Interval History:  Patient with reports of unchanged chest pain 7-8/10 since pericardiocentesis. States she did get up with PT earlier today and it went okay, but she has increased pain with deep inspiration and lots of movement.    Changes Today:  - Trial of drain clamp 8/10 unsuccessful with trivial but circumferential effusion remaining on echo. Drain unclamped at 1600.   - PRN Robaxin and Tramadol available  - Worsening Crt, dilated IVC on echo today, 20 mg IV Lasix given for gentle diuresis    # pAF  # intolerance to AC due to GIB s/p Watchman (8/4/24)  # pericardial effusion s/p pericardiocentesis 8/8  Patient with a CHADsVASC 5 and HASBLED of 5. She has had difficulty tolerating eliquis in the past due to recurrent GIB in the setting of AVMs  - s/p 31mm WATCHMAN FLX device placement on August 8, 2024  Watchman procedure complicated by new pericardial effusion without tamponade. S/p pericardiocentesis 8/8 with 220 ml off. There was no contrast extravasation noted. Patient admitted for post procedural monitoring with pericardial drain in place. Trial of drain clamp 8/10 unsuccessful with trivial but circumferential effusion remaining on echo. Will attempt again overnight.  - Aspirin 81 mg daily and plavix 75 mg daily  - Hold PTA metoprolol tartrate 50 mg BID  - SBE prophylaxis x 6 months post LAAO   - Follow-up structural NAP[place]ON 1 week and  45 days post procedure  - 45 days following Watchman device implantation, patient will return for MICHELL to evaluate endothelialization of the device. If adequate seal is assessed (<5mm), dual antiplatelet therapy with clopidogrel and aspirin will be continued for total of 6 months from implantation date, with aspirin then continuing lifelong.    - Telemetry     # CAD s/p multiple PCIs  # HLD  Extensive CAD history with multiple PCIs in the past. Most recently patient had a PCI to the mLAD on 11/26/23.   - Currently without anginal symptoms  - ASA as above  - Continue PTA rosuvastatin 20 mg daily when LFTs normalize  - Holding PTA metoprolol tartrate 50 gm BID     # HTN  # HFpEF  Dilated IVC on 8/10 echo.   - Gentle diuresis as tolerated  - PTA isosorbide mononitrate 60 mg BID  - Holding PTA BB, amlodipine 10 mg daily  - Daily weights  - I/O     #SSS s/p dual chamber PPM  Last device check 5/28/24 with 6.6% AF burden. AP 27.5%,  0.1%  - OP EP follow up     # Concern for infection  Started on abx on 8/8 due to elevated LA and WBC. Currently without localizing symptoms.  - LA normalized on 8/10  - Monitor off abx     # SHAJI on CKD IV  Baseline Cr 1.7-2.0. Cr 1.71 on admission.  - Crt 2.30 (2.19)  - Avoid nephrotoxic meds  - Renally dose meds  - Gentle diuresis as above     # Elevated LFTs, improving  - Holding statin as above  - Daily CMP    # acute blood loss anemia - stable  # anemia of chronic disease  # history of GIB 2/2 AVMs  Baseline Hgb ~7.5-9.0. Presented with Hgb 8.8, given 1 U pRBC during the case. Currently without overt bleeding or high pericardial drain output.  - Off AC due to GIB in addition to concern for pericardial effusion  - Presently without overt blood losses  - CBC daily     # L groin hematoma:  Following pull of art sheath 8/8. Improved with local pressure and fem stop  - US without evidence of active bleeding and pseudoaneurysm  - CTM      Diet: regular  Activity: as tolerated  Code Status:  "full  Disposition: OT recs: home with assist, dispo pending resolution of effusion and SHAJI    Patient discussed w/ Dr. Macias.    Medically Ready for Discharge: Anticipated in 2-4 Days       Shalini WORKMAN CNP  [facility]  Interventional Cardiology      Most recent vital signs:  BP (!) 149/68 (BP Location: Right arm)   Pulse 74   Temp 99.2  F (37.3  C) (Oral)   Resp 18   Ht 1.676 m (5' 6\")   Wt 69.8 kg (153 lb 14.4 oz)   SpO2 95%   BMI 24.84 kg/m    Temp:  [97.8  F (36.6  C)-99.4  F (37.4  C)] 99.2  F (37.3  C)  Pulse:  [62-78] 74  Resp:  [18-20] 18  BP: (119-156)/(58-70) 149/68  SpO2:  [92 %-99 %] 95 %  Wt Readings from Last 2 Encounters:   08/10/24 69.8 kg (153 lb 14.4 oz)   08/05/24 67.9 kg (149 lb 11.2 oz)       Intake/Output Summary (Last 24 hours) at 8/10/2024 1636  Last data filed at 8/10/2024 1200  Gross per 24 hour   Intake 640 ml   Output 0 ml   Net 640 ml       Physical exam:  General: In bed, in NAD  HEENT: EOMI, PERRLA, no scleral icterus or injection  CARDIAC: RRR, no m/r/g appreciated. Peripheral pulses 2+  RESP: CTAB, no wheezes, rhonchi or crackles appreciated.  GI: NABS, NT/ND, no guarding or rebound  EXTREMITIES: Trace BLE edema, pulses 2+.  SKIN: No acute lesions appreciated; scattered ecchymosis  NEURO: Alert and oriented X3, CN II-XII grossly intact, no focal neurological deficits noted      Labs (Past three days):  CBC  Recent Labs   Lab 08/10/24  0019 08/09/24  0412 08/09/24  0019 08/08/24  1811 08/08/24  1421 08/08/24  0651   WBC 17.1* 17.4* 14.8*  --   --  6.4   RBC 3.05* 3.08* 3.13*  --   --  3.00*   HGB 9.0* 9.0* 9.3* 8.8*   < > 8.8*   HCT 29.5* 29.2* 30.0*  --   --  29.1*   MCV 97 95 96  --   --  97   MCH 29.5 29.2 29.7  --   --  29.3   MCHC 30.5* 30.8* 31.0*  --   --  30.2*   RDW 18.6* 17.6* 17.3*  --   --  17.2*   PLT 92* 92* 91*  --   --  99*    < > = values in this interval not displayed.     BMP  Recent Labs   Lab 08/10/24  1546 " 08/10/24  0019 08/09/24  1518 08/09/24  1517 08/09/24  0621 08/09/24  0412 08/08/24  0616 08/05/24  1305    140  --  140  --  143   < > 144   POTASSIUM 4.5 4.6  --  4.8  --  4.3   < > 4.6   CHLORIDE 109* 111*  --  112*  --  112*   < > 111*   CO2 18* 18*  --  15*  --  14*   < > 22   ANIONGAP 12 11  --  13  --  17*   < > 11   GLC 97 131* 182* 177*  --  177*   < > 93   BUN 54.5* 51.9*  --  46.8*  --  45.0*   < > 47.8*   CR 2.30* 2.19*  --  1.88*  --  1.76*   < > 1.69*   GFRESTIMATED 20* 21*  --  25*  --  28*   < > 29*   ALEXANDRO 8.2* 7.8*  --  7.8*  --  8.1*   < > 8.8   MAG  --  2.2  --   --   --  2.1  --   --    PHOS  --   --   --   --  3.4  --   --  2.6    < > = values in this interval not displayed.     Troponins:   Lab Results   Component Value Date    CTROPT 77 (H) 08/09/2024    CTROPT 77 (H) 08/08/2024    CTROPT 65 (H) 08/08/2024    CTROPT 33 (H) 07/03/2024    CTROPT 34 (H) 03/17/2024    TROPONINIS 11 07/15/2022        INR  Recent Labs   Lab 08/10/24  0521 08/09/24  0019 08/08/24  0651 08/05/24  1306   INR 1.43* 1.31* 1.14 1.22*     Liver panel  Recent Labs   Lab 08/10/24  1546 08/10/24  0019 08/09/24  1517 08/09/24 0621   PROTTOTAL 5.8* 5.7* 5.7* 5.6*   ALBUMIN 3.4* 3.4* 3.5 3.4*   BILITOTAL 0.4 0.3 0.3 0.4   ALKPHOS 124 119 121 116   * 334* 424* 499*   * 201* 228* 243*       Imaging/procedure results:    EKG 12 Lead 8/8/24:       Echocardiogram 8/10/24:  Interpretation Summary  Normal biventricular function.  Trivial and circumferential pericardial effusion present.  Mean RA pressure is elevated ~15 mmHg  ______________________________________________________________________________  Left Ventricle  Global and regional left ventricular function is normal with an EF of 55-60%.     Right Ventricle  Global right ventricular function is normal. A pacemaker lead is noted in the  right ventricle.     Vessels  Dilation of the inferior vena cava is present with abnormal respiratory  variation in  diameter.     Pericardium  Trivial pericardial effusion is present.     Compared to Previous Study  This study was compared with the study from 8.9.24 . No change in the  pericardial effusion.    Coronary Angiogram 11/6/23:    Prox LAD to Mid LAD lesion is 40% stenosed.    Ost LAD to Prox LAD lesion is 50% stenosed.    Prox Cx to Mid Cx lesion is 55% stenosed.    1st Diag lesion is 85% stenosed.    2nd Diag lesion is 70% stenosed.    RPDA-2 lesion is 50% stenosed.    RPDA-1 lesion is 65% stenosed.    Mid LAD lesion is 70% stenosed.     S/p ALVAREZ to mid LAD        Medical Decision Making   60 MINUTES SPENT BY ME on the date of service doing chart review, history, exam, documentation & further activities per the note.

## 2024-08-11 ENCOUNTER — APPOINTMENT (OUTPATIENT)
Dept: CARDIOLOGY | Facility: CLINIC | Age: 89
DRG: 274 | End: 2024-08-11
Payer: COMMERCIAL

## 2024-08-11 ENCOUNTER — APPOINTMENT (OUTPATIENT)
Dept: OCCUPATIONAL THERAPY | Facility: CLINIC | Age: 89
DRG: 274 | End: 2024-08-11
Attending: INTERNAL MEDICINE
Payer: COMMERCIAL

## 2024-08-11 LAB
ALBUMIN SERPL BCG-MCNC: 3.1 G/DL (ref 3.5–5.2)
ALBUMIN SERPL BCG-MCNC: 3.3 G/DL (ref 3.5–5.2)
ALP SERPL-CCNC: 108 U/L (ref 40–150)
ALP SERPL-CCNC: 124 U/L (ref 40–150)
ALT SERPL W P-5'-P-CCNC: 93 U/L (ref 0–50)
ALT SERPL W P-5'-P-CCNC: 94 U/L (ref 0–50)
ANION GAP SERPL CALCULATED.3IONS-SCNC: 10 MMOL/L (ref 7–15)
ANION GAP SERPL CALCULATED.3IONS-SCNC: 11 MMOL/L (ref 7–15)
AST SERPL W P-5'-P-CCNC: 111 U/L (ref 0–45)
AST SERPL W P-5'-P-CCNC: 132 U/L (ref 0–45)
BILIRUB SERPL-MCNC: 0.4 MG/DL
BILIRUB SERPL-MCNC: 0.5 MG/DL
BUN SERPL-MCNC: 52.9 MG/DL (ref 8–23)
BUN SERPL-MCNC: 54.7 MG/DL (ref 8–23)
CALCIUM SERPL-MCNC: 8 MG/DL (ref 8.8–10.4)
CALCIUM SERPL-MCNC: 8.1 MG/DL (ref 8.8–10.4)
CHLORIDE SERPL-SCNC: 107 MMOL/L (ref 98–107)
CHLORIDE SERPL-SCNC: 109 MMOL/L (ref 98–107)
CREAT SERPL-MCNC: 2.39 MG/DL (ref 0.51–0.95)
CREAT SERPL-MCNC: 2.58 MG/DL (ref 0.51–0.95)
EGFRCR SERPLBLD CKD-EPI 2021: 17 ML/MIN/1.73M2
EGFRCR SERPLBLD CKD-EPI 2021: 19 ML/MIN/1.73M2
ERYTHROCYTE [DISTWIDTH] IN BLOOD BY AUTOMATED COUNT: 18.7 % (ref 10–15)
GLUCOSE SERPL-MCNC: 88 MG/DL (ref 70–99)
GLUCOSE SERPL-MCNC: 93 MG/DL (ref 70–99)
HCO3 SERPL-SCNC: 18 MMOL/L (ref 22–29)
HCO3 SERPL-SCNC: 19 MMOL/L (ref 22–29)
HCT VFR BLD AUTO: 27 % (ref 35–47)
HGB BLD-MCNC: 8.1 G/DL (ref 11.7–15.7)
LACTATE SERPL-SCNC: 0.7 MMOL/L (ref 0.7–2)
LVEF ECHO: NORMAL
LVEF ECHO: NORMAL
MAGNESIUM SERPL-MCNC: 2 MG/DL (ref 1.7–2.3)
MCH RBC QN AUTO: 29.5 PG (ref 26.5–33)
MCHC RBC AUTO-ENTMCNC: 30 G/DL (ref 31.5–36.5)
MCV RBC AUTO: 98 FL (ref 78–100)
MISCELLANEOUS TEST 1 (ARUP): NORMAL
PLATELET # BLD AUTO: 80 10E3/UL (ref 150–450)
POTASSIUM SERPL-SCNC: 4.3 MMOL/L (ref 3.4–5.3)
POTASSIUM SERPL-SCNC: 4.7 MMOL/L (ref 3.4–5.3)
PROT SERPL-MCNC: 5.2 G/DL (ref 6.4–8.3)
PROT SERPL-MCNC: 5.8 G/DL (ref 6.4–8.3)
RBC # BLD AUTO: 2.75 10E6/UL (ref 3.8–5.2)
SODIUM SERPL-SCNC: 137 MMOL/L (ref 135–145)
SODIUM SERPL-SCNC: 137 MMOL/L (ref 135–145)
WBC # BLD AUTO: 15.4 10E3/UL (ref 4–11)

## 2024-08-11 PROCEDURE — 33017 PRCRD DRG 6YR+ W/O CGEN CAR: CPT | Performed by: INTERNAL MEDICINE

## 2024-08-11 PROCEDURE — 99207 ECHOCARDIOGRAM LIMITED: CPT | Mod: 26 | Performed by: INTERNAL MEDICINE

## 2024-08-11 PROCEDURE — 250N000013 HC RX MED GY IP 250 OP 250 PS 637: Performed by: STUDENT IN AN ORGANIZED HEALTH CARE EDUCATION/TRAINING PROGRAM

## 2024-08-11 PROCEDURE — 93325 DOPPLER ECHO COLOR FLOW MAPG: CPT

## 2024-08-11 PROCEDURE — 250N000013 HC RX MED GY IP 250 OP 250 PS 637: Performed by: INTERNAL MEDICINE

## 2024-08-11 PROCEDURE — 93325 DOPPLER ECHO COLOR FLOW MAPG: CPT | Mod: 26 | Performed by: INTERNAL MEDICINE

## 2024-08-11 PROCEDURE — 84075 ASSAY ALKALINE PHOSPHATASE: CPT | Performed by: INTERNAL MEDICINE

## 2024-08-11 PROCEDURE — 0W9D3ZZ DRAINAGE OF PERICARDIAL CAVITY, PERCUTANEOUS APPROACH: ICD-10-PCS | Performed by: INTERNAL MEDICINE

## 2024-08-11 PROCEDURE — 93308 TTE F-UP OR LMTD: CPT | Mod: 26 | Performed by: INTERNAL MEDICINE

## 2024-08-11 PROCEDURE — 84155 ASSAY OF PROTEIN SERUM: CPT | Performed by: INTERNAL MEDICINE

## 2024-08-11 PROCEDURE — 97530 THERAPEUTIC ACTIVITIES: CPT | Mod: GO

## 2024-08-11 PROCEDURE — 36415 COLL VENOUS BLD VENIPUNCTURE: CPT

## 2024-08-11 PROCEDURE — 33016 PERICARDIOCENTESIS W/IMAGING: CPT | Performed by: INTERNAL MEDICINE

## 2024-08-11 PROCEDURE — 93308 TTE F-UP OR LMTD: CPT

## 2024-08-11 PROCEDURE — 85027 COMPLETE CBC AUTOMATED: CPT | Performed by: INTERNAL MEDICINE

## 2024-08-11 PROCEDURE — 93321 DOPPLER ECHO F-UP/LMTD STD: CPT | Mod: 26 | Performed by: INTERNAL MEDICINE

## 2024-08-11 PROCEDURE — 250N000011 HC RX IP 250 OP 636: Performed by: INTERNAL MEDICINE

## 2024-08-11 PROCEDURE — 83605 ASSAY OF LACTIC ACID: CPT

## 2024-08-11 PROCEDURE — 272N000001 HC OR GENERAL SUPPLY STERILE: Performed by: INTERNAL MEDICINE

## 2024-08-11 PROCEDURE — 99233 SBSQ HOSP IP/OBS HIGH 50: CPT

## 2024-08-11 PROCEDURE — 250N000013 HC RX MED GY IP 250 OP 250 PS 637: Performed by: NURSE PRACTITIONER

## 2024-08-11 PROCEDURE — 250N000011 HC RX IP 250 OP 636

## 2024-08-11 PROCEDURE — 250N000013 HC RX MED GY IP 250 OP 250 PS 637

## 2024-08-11 PROCEDURE — 250N000009 HC RX 250: Performed by: INTERNAL MEDICINE

## 2024-08-11 PROCEDURE — 82040 ASSAY OF SERUM ALBUMIN: CPT | Performed by: INTERNAL MEDICINE

## 2024-08-11 PROCEDURE — 99152 MOD SED SAME PHYS/QHP 5/>YRS: CPT | Performed by: INTERNAL MEDICINE

## 2024-08-11 PROCEDURE — 120N000003 HC R&B IMCU UMMC

## 2024-08-11 PROCEDURE — 36415 COLL VENOUS BLD VENIPUNCTURE: CPT | Performed by: INTERNAL MEDICINE

## 2024-08-11 PROCEDURE — 83735 ASSAY OF MAGNESIUM: CPT | Performed by: INTERNAL MEDICINE

## 2024-08-11 RX ORDER — FUROSEMIDE 10 MG/ML
40 INJECTION INTRAMUSCULAR; INTRAVENOUS ONCE
Status: COMPLETED | OUTPATIENT
Start: 2024-08-11 | End: 2024-08-11

## 2024-08-11 RX ORDER — FENTANYL CITRATE 50 UG/ML
INJECTION, SOLUTION INTRAMUSCULAR; INTRAVENOUS
Status: DISCONTINUED | OUTPATIENT
Start: 2024-08-11 | End: 2024-08-11 | Stop reason: HOSPADM

## 2024-08-11 RX ORDER — FENTANYL CITRATE 50 UG/ML
25 INJECTION, SOLUTION INTRAMUSCULAR; INTRAVENOUS
Status: DISCONTINUED | OUTPATIENT
Start: 2024-08-11 | End: 2024-08-11

## 2024-08-11 RX ORDER — FUROSEMIDE 10 MG/ML
40 INJECTION INTRAMUSCULAR; INTRAVENOUS DAILY
Status: DISCONTINUED | OUTPATIENT
Start: 2024-08-12 | End: 2024-08-13

## 2024-08-11 RX ADMIN — PANTOPRAZOLE SODIUM 40 MG: 40 TABLET, DELAYED RELEASE ORAL at 08:13

## 2024-08-11 RX ADMIN — METHOCARBAMOL 500 MG: 500 TABLET ORAL at 08:38

## 2024-08-11 RX ADMIN — TIMOLOL MALEATE 1 DROP: 5 SOLUTION/ DROPS OPHTHALMIC at 08:12

## 2024-08-11 RX ADMIN — ACETAMINOPHEN 650 MG: 325 TABLET, FILM COATED ORAL at 08:38

## 2024-08-11 RX ADMIN — CYANOCOBALAMIN TAB 500 MCG 500 MCG: 500 TAB at 08:13

## 2024-08-11 RX ADMIN — TRAMADOL HYDROCHLORIDE 25 MG: 50 TABLET, COATED ORAL at 09:57

## 2024-08-11 RX ADMIN — PANTOPRAZOLE SODIUM 40 MG: 40 TABLET, DELAYED RELEASE ORAL at 19:54

## 2024-08-11 RX ADMIN — ALLOPURINOL 100 MG: 100 TABLET ORAL at 08:13

## 2024-08-11 RX ADMIN — ACETAMINOPHEN 650 MG: 325 TABLET, FILM COATED ORAL at 16:34

## 2024-08-11 RX ADMIN — ASPIRIN 81 MG: 81 TABLET ORAL at 08:13

## 2024-08-11 RX ADMIN — TRAMADOL HYDROCHLORIDE 25 MG: 50 TABLET, COATED ORAL at 02:18

## 2024-08-11 RX ADMIN — CLOPIDOGREL BISULFATE 75 MG: 75 TABLET ORAL at 08:13

## 2024-08-11 RX ADMIN — METHOCARBAMOL 500 MG: 500 TABLET ORAL at 16:34

## 2024-08-11 RX ADMIN — FUROSEMIDE 40 MG: 10 INJECTION, SOLUTION INTRAVENOUS at 13:07

## 2024-08-11 RX ADMIN — LIDOCAINE 1 PATCH: 4 PATCH TOPICAL at 03:35

## 2024-08-11 RX ADMIN — ISOSORBIDE MONONITRATE 60 MG: 20 TABLET ORAL at 19:54

## 2024-08-11 RX ADMIN — ISOSORBIDE MONONITRATE 60 MG: 20 TABLET ORAL at 08:12

## 2024-08-11 RX ADMIN — ACETAMINOPHEN 650 MG: 325 TABLET, FILM COATED ORAL at 20:35

## 2024-08-11 ASSESSMENT — ACTIVITIES OF DAILY LIVING (ADL)
ADLS_ACUITY_SCORE: 29
ADLS_ACUITY_SCORE: 31
ADLS_ACUITY_SCORE: 29
ADLS_ACUITY_SCORE: 31
ADLS_ACUITY_SCORE: 29
ADLS_ACUITY_SCORE: 31
ADLS_ACUITY_SCORE: 31

## 2024-08-11 NOTE — PROGRESS NOTES
Interventional Cardiology Progress Note      Assessment & Plan:  Tessa Mansfield is a 89 year old female who presents to the CICU following a Watchman device placement. 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 Admitted to the CICU due to development of a pericardial effusion following WATCHMAN placement. Because subsequent TTE suggested worsening of effusion, patient underwent pericardiocentesis on 8/8 w/ 220 mL and drain placement.     Interval History:  Patient with reports of unchanged chest pain 7-8/10 since pericardiocentesis. States PRN meds have provided some relief. She did get up with PT earlier today and they walked down the marshall, but she continues to endorse increased pain with deep inspiration and lots of movement.    Changes Today:  - 40 mg IV Lasix daily  - Lymphedema consult  - Echo with slightly larger pericardial effusion today after 2 overnights of failed clamp trials  - ASA stopped  - Dr. Garcia to adjust/flush current drain, possibly place new drain if needed this afternoon/evening    # pAF  # intolerance to AC due to GIB s/p Watchman (8/4/24)  # pericardial effusion s/p pericardiocentesis 8/8  Patient with a CHADsVASC 5 and HASBLED of 5. She has had difficulty tolerating eliquis in the past due to recurrent GIB in the setting of AVMs.  S/p 31mm WATCHMAN FLX device placement on August 8, 2024  Watchman procedure complicated by new pericardial effusion without tamponade. S/p pericardiocentesis 8/8 with 220 ml off. There was no contrast extravasation noted. Patient admitted for post procedural monitoring with pericardial drain in place.   Trial of drain clamp overnight 8/9 and 8/10 unsuccessful. No new drain output documented and patient with slightly larger effusion on 8/11 echo.  Drain has possibly clotted off as previous discharge grossly elisabeth red  drainage. Per discussion with Dr. Garcia:  > Stop Aspirin 81 mg daily   > Unclamp drain  > Patient to proceed for drain adjustment, flush, and possibly new drain placement this afternoon/evening  - Plavix 75 mg daily  - Hold PTA metoprolol tartrate 50 mg BID - slowly reintroduce as tolerated  - SBE prophylaxis x 6 months post LAAO   - Follow-up structural NAP[place]LEON 1 week and 45 days post procedure  - 45 days following Watchman device implantation, patient will return for MICHELL to evaluate endothelialization of the device. If adequate seal is assessed (<5mm), antiplatelet therapy with clopidogrel will be continued for total of 6 months from implantation date, with aspirin then continuing lifelong.    - Telemetry     # CAD s/p multiple PCIs  # HLD  Extensive CAD history with multiple PCIs in the past. Most recently patient had a PCI to the mLAD on 11/26/23.  - ASA as above  - Continue PTA rosuvastatin 20 mg daily when LFTs normalize     # HTN  # HFpEF  Dilated IVC on 8/10 echo.   - Volume status: hypervolemic; start IV Lasix 40 mg daily, extra dose if required  - IV diuresis as tolerated  - PTA isosorbide mononitrate 60 mg BID  - PTA amlodipine 10 mg daily - slowly reintroduce as tolerated  - Daily weights  - I/O     #SSS s/p dual chamber PPM  Last device check 5/28/24 with 6.6% AF burden. AP 27.5%,  0.1%  - OP EP follow up     # Concern for infection  # Leukocytosis  Started on abx on 8/8 due to elevated LA and WBC. Currently without localizing symptoms.  - LA normalized on 8/10  - Monitor off abx     # SHAJI on CKD IV  Baseline Cr 1.7-2.0. Cr 1.71 on admission.  - Crt 2.39(2.30)  - Avoid nephrotoxic meds  - Renally dose meds  - Gentle diuresis as above     # Elevated LFTs, improving  - Holding statin as above  - Daily CMP    # acute blood loss anemia - stable  # anemia of chronic disease  # history of GIB 2/2 AVMs  Baseline Hgb ~7.5-9.0. Presented with Hgb 8.8, given 1 U pRBC during the case. Currently without overt  "bleeding or high pericardial drain output.  - Off AC due to GIB in addition to concern for pericardial effusion  - Presently without overt blood losses  - CBC daily     # L groin hematoma:  Following pull of art sheath 8/8. Improved with local pressure and fem stop  - US without evidence of active bleeding and pseudoaneurysm  - CTM    # Discharge Planning  Current PT/OT recs TCU or home with assist. Patient states she prefers rehab because she feels very deconditioned.   - Appreciate SW/RNCC assist in dispo planning    Diet: regular  Activity: as tolerated  Code Status: full  Disposition: current recs: TCU or home with assist, dispo pending resolution of effusion, hypervolemia, and SHAJI    Patient discussed w/ Dr. Macias.    Medically Ready for Discharge: Anticipated in 2-4 Days       Shalini WORKMAN CNP  [facility]  Interventional Cardiology      Most recent vital signs:  /63 (BP Location: Right arm)   Pulse 84   Temp 99.2  F (37.3  C) (Oral)   Resp 16   Ht 1.676 m (5' 6\")   Wt 69.6 kg (153 lb 8 oz)   SpO2 90%   BMI 24.78 kg/m    Temp:  [98  F (36.7  C)-99.2  F (37.3  C)] 99.2  F (37.3  C)  Pulse:  [68-85] 84  Resp:  [16-18] 16  BP: (117-156)/(49-68) 125/63  SpO2:  [90 %-96 %] 90 %  Wt Readings from Last 2 Encounters:   08/11/24 69.6 kg (153 lb 8 oz)   08/05/24 67.9 kg (149 lb 11.2 oz)       Intake/Output Summary (Last 24 hours) at 8/11/2024 1618  Last data filed at 8/11/2024 1600  Gross per 24 hour   Intake 950 ml   Output 500 ml   Net 450 ml      Physical exam:  General: In bed, in NAD  HEENT: EOMI, PERRLA, no scleral icterus or injection  CARDIAC: RRR, no m/r/g appreciated. Peripheral pulses 2+  RESP: CTAB, no wheezes, rhonchi or crackles appreciated.  GI: NABS, NT/ND, no guarding or rebound  EXTREMITIES: 2+ BLE edema, pulses 2+.  SKIN: No acute lesions appreciated; scattered ecchymosis  NEURO: Alert and oriented X3, CN II-XII grossly intact, no focal neurological " deficits noted    Drain: pericardial drain; unclamped at 1430 after failed trial    Labs (Past three days):  CBC  Recent Labs   Lab 08/11/24  0454 08/10/24  0019 08/09/24  0412 08/09/24  0019   WBC 15.4* 17.1* 17.4* 14.8*   RBC 2.75* 3.05* 3.08* 3.13*   HGB 8.1* 9.0* 9.0* 9.3*   HCT 27.0* 29.5* 29.2* 30.0*   MCV 98 97 95 96   MCH 29.5 29.5 29.2 29.7   MCHC 30.0* 30.5* 30.8* 31.0*   RDW 18.7* 18.6* 17.6* 17.3*   PLT 80* 92* 92* 91*     BMP  Recent Labs   Lab 08/11/24  0454 08/10/24  1546 08/10/24  0019 08/09/24  1518 08/09/24  1517 08/09/24  0621 08/09/24  0412 08/08/24  0616 08/05/24  1305    139 140  --  140  --  143   < > 144   POTASSIUM 4.3 4.5 4.6  --  4.8  --  4.3   < > 4.6   CHLORIDE 109* 109* 111*  --  112*  --  112*   < > 111*   CO2 18* 18* 18*  --  15*  --  14*   < > 22   ANIONGAP 10 12 11  --  13  --  17*   < > 11   GLC 93 97 131* 182* 177*  --  177*   < > 93   BUN 52.9* 54.5* 51.9*  --  46.8*  --  45.0*   < > 47.8*   CR 2.39* 2.30* 2.19*  --  1.88*  --  1.76*   < > 1.69*   GFRESTIMATED 19* 20* 21*  --  25*  --  28*   < > 29*   ALEXANDRO 8.0* 8.2* 7.8*  --  7.8*  --  8.1*   < > 8.8   MAG 2.0  --  2.2  --   --   --  2.1  --   --    PHOS  --   --   --   --   --  3.4  --   --  2.6    < > = values in this interval not displayed.     Troponins:   Lab Results   Component Value Date    CTROPT 77 (H) 08/09/2024    CTROPT 77 (H) 08/08/2024    CTROPT 65 (H) 08/08/2024    CTROPT 33 (H) 07/03/2024    CTROPT 34 (H) 03/17/2024    TROPONINIS 11 07/15/2022        INR  Recent Labs   Lab 08/10/24  0521 08/09/24  0019 08/08/24  0651 08/05/24  1306   INR 1.43* 1.31* 1.14 1.22*     Liver panel  Recent Labs   Lab 08/11/24  0454 08/10/24  1546 08/10/24  0019 08/09/24  1517   PROTTOTAL 5.2* 5.8* 5.7* 5.7*   ALBUMIN 3.1* 3.4* 3.4* 3.5   BILITOTAL 0.4 0.4 0.3 0.3   ALKPHOS 108 124 119 121   * 219* 334* 424*   ALT 93* 145* 201* 228*       Imaging/procedure results:    EKG 12 Lead 8/8/24:       Echocardiogram  8/11/24:  Interpretation Summary  Normal biventricular function.  Small sized circumferential pericaridal effusion is present, measuring 0.9 cm  posteriorly. No chamber invagination.  ______________________________________________________________________________  Left Ventricle  Global and regional left ventricular function is normal with an EF of 55-60%.     Right Ventricle  Global right ventricular function is normal.     Pericardium  Small sized circumferential pericaridal effusion is present, measuring 0.9 cm  posteriorly. No chamber invagination.     Miscellaneous  A left pleural effusion is present.     Compared to Previous Study  This study was compared with the study from 8.10.24 . Pericaridal effusion is  slightly larger.    Coronary Angiogram 11/6/23:    Prox LAD to Mid LAD lesion is 40% stenosed.    Ost LAD to Prox LAD lesion is 50% stenosed.    Prox Cx to Mid Cx lesion is 55% stenosed.    1st Diag lesion is 85% stenosed.    2nd Diag lesion is 70% stenosed.    RPDA-2 lesion is 50% stenosed.    RPDA-1 lesion is 65% stenosed.    Mid LAD lesion is 70% stenosed.     S/p ALVAREZ to mid LAD        Medical Decision Making   60 MINUTES SPENT BY ME on the date of service doing chart review, history, exam, documentation & further activities per the note.

## 2024-08-11 NOTE — PROGRESS NOTES
Care Management Follow Up    Length of Stay (days): 3    Expected Discharge Date: 08/09/2024     Concerns to be Addressed:       Patient plan of care discussed at interdisciplinary rounds: No    Anticipated Discharge Disposition:  (TBD)     Anticipated Discharge Services:  (TBD)  Anticipated Discharge DME:  (TBD)    Patient/family educated on Medicare website which has current facility and service quality ratings:    Education Provided on the Discharge Plan:    Patient/Family in Agreement with the Plan:      Referrals Placed by CM/SW:    Private pay costs discussed: Not applicable    Additional Information:  Vist per team request as pt was requesting information specific to TCU options rather than home care. MSW met with pt and provided a list of TCU options near [place], [place]. Pt verbalized request to not seek placement at the [facility] and preferred an option on [facility] though could not remember the name of it. After reviewing the list, Tessa confirmed she was previously at [facility] and would ideally prefer to go back there again. Tessa also noted that her  was at [facility] in the past and that she wouldn't mind placement there, though would still identify [facility] more. MSW offered to send referrals and Tessa verbalized preference to review list with  to identify their top choices.     Sabrina Dueñas, CAMACHO, [facility]  8/11/2024       Social Work and Care Management Department       SEARCHABLE in [employer] - search SOCIAL WORK       [place] ([phone]) Saturday and Sunday     Units: 4A Vocera, 4C Vocera, & 4E Vocera        Units: 5A 1387-7682 Vocera, 5A 5061-9728 Vocera , BMT SW 1 BMT SW 2, BMT SW 3 & BMT SW 4  5C Off Service 5401 - 5416  5C Off Service 3849-0420     Units: 6A Vocera & 6B Vocera      Units: 6C Vocera     Units: 7A Vocera & 7B Vocera      Units: 7C Med Surg 7401 thru 7418 and 7C Med Surg 3565  thru 7521      Unit: [place] ED Vocera & [place] Obs Vocera     [facility] ([phone]) Saturday and Sunday      Units: 5 Ortho Vocera, 5 Med Surg Vocera & WB ED Vocera     Units: 6 Med Surg Vocera, 8 Med Surg Vocera, & 10 ICU Vocera      After hours Vocera [facility] and After Hours Vocera [place]     Saturday & Sunday (1630 - 0000)    Mon-Fri (1206-3416)     [facility] Recognized Holidays  ([phone])    Units: ALL   - see above VOCERA links to units and after hours

## 2024-08-11 NOTE — PLAN OF CARE
Hours of Care: 2300 - 0730    Neuro: A&Ox4, able to articulate needs and call appropriately  Cardiac:  SR / Sinus arrhythmia with frequent ectopy (provider aware); intermittently a-paced  Respiratory: Sats above 90 on room air; HIGGINS, no SOB at rest    GI/:  Voids spontaneously without issue; up to bathroom to void, one unmeasured occurrence this shift (hat was not in bathroom); last BM 8/10  Diet:  Regular diet  Activity:  SBA with 4 wheel walker from home  Pain:  Complains of pain in anterior right chest, present since pericardiocentesis; rated 9 out of 10 after activity (ambulating to bathroom). Gave PRN Tramadol x1, heat pack x1, lidocaine patch applied around 0330 (heat pack removed at that time)  Skin: No new deficits, see flowsheets for detailed assessment  LDA's: L and R PIVs; pericardial chest tube, clamped overnight (per order and natalie shift communication with cards CSI night coverage)  Plan: Likely discharge to home with support when medically ready, pending resolution of effusion    Problem: Adult Inpatient Plan of Care  Goal: Plan of Care Review  Description: The Plan of Care Review/Shift note should be completed every shift.  The Outcome Evaluation is a brief statement about your assessment that the patient is improving, declining, or no change.  This information will be displayed automatically on your shift  note.  Outcome: Progressing  Flowsheets (Taken 8/11/2024 0256)  Outcome Evaluation:   Continues to endorse post-cardiocentesis chest pain, rating up to 9 out of 10 following activity   VSS on room air   no signs of bleeding  Plan of Care Reviewed With: patient  Overall Patient Progress: no change   Goal Outcome Evaluation:      Plan of Care Reviewed With: patient    Overall Patient Progress: no changeOverall Patient Progress: no change    Outcome Evaluation: Continues to endorse post-cardiocentesis chest pain, rating up to 9 out of 10 following activity; VSS on room air; no signs of bleeding

## 2024-08-11 NOTE — PLAN OF CARE
Neuro: A&Ox4; able to make needs known and calls appropriately  Cardiac: Afebrile, VSS. SR w/ occasional PVCs 70's w/o c/o chest pain/palpitations   Respiratory: RA  GI/: Voiding spontaneously. W/ BM x1 this shift  Diet/appetite: Tolerating Regular diet w/ good appetite; Denies nausea.  Activity: Up ad court w/ stand-by assist  Pain: w/ intermittent c/o R chest pain radiating to the back, pain managed by PRN Tylenol, Robaxin and Tramadol   Skin: No new deficits noted. Pericardial drain site, wdl, dressing c/d/I  Drains: Pericardial drain to suction, may place as water seal when ambulating  Lines: R and L PIV, saline locked, patent and intact  Plan: Continue POC and notify the team of any changes    Updates this shift:   - s/p Pericardial drain flush/aspiration  - 40 mg IV Lasix daily  - Lymphedema consult  - Echo with slightly larger pericardial effusion today after 2 overnights of failed clamp trials  - ASA stopped  - Pt requested information specific to TCU options rather than home care- MSW met with pt and provided a list of TCU options

## 2024-08-12 ENCOUNTER — APPOINTMENT (OUTPATIENT)
Dept: OCCUPATIONAL THERAPY | Facility: CLINIC | Age: 89
DRG: 274 | End: 2024-08-12
Attending: INTERNAL MEDICINE
Payer: COMMERCIAL

## 2024-08-12 ENCOUNTER — APPOINTMENT (OUTPATIENT)
Dept: OCCUPATIONAL THERAPY | Facility: CLINIC | Age: 89
DRG: 274 | End: 2024-08-12
Payer: COMMERCIAL

## 2024-08-12 ENCOUNTER — APPOINTMENT (OUTPATIENT)
Dept: PHYSICAL THERAPY | Facility: CLINIC | Age: 89
DRG: 274 | End: 2024-08-12
Attending: INTERNAL MEDICINE
Payer: COMMERCIAL

## 2024-08-12 ENCOUNTER — APPOINTMENT (OUTPATIENT)
Dept: CARDIOLOGY | Facility: CLINIC | Age: 89
DRG: 274 | End: 2024-08-12
Attending: HOSPITALIST
Payer: COMMERCIAL

## 2024-08-12 LAB
ALBUMIN SERPL BCG-MCNC: 2.9 G/DL (ref 3.5–5.2)
ALBUMIN SERPL BCG-MCNC: 3.6 G/DL (ref 3.5–5.2)
ALP SERPL-CCNC: 112 U/L (ref 40–150)
ALP SERPL-CCNC: 129 U/L (ref 40–150)
ALT SERPL W P-5'-P-CCNC: 70 U/L (ref 0–50)
ALT SERPL W P-5'-P-CCNC: 70 U/L (ref 0–50)
ANION GAP SERPL CALCULATED.3IONS-SCNC: 11 MMOL/L (ref 7–15)
ANION GAP SERPL CALCULATED.3IONS-SCNC: 14 MMOL/L (ref 7–15)
AST SERPL W P-5'-P-CCNC: 72 U/L (ref 0–45)
AST SERPL W P-5'-P-CCNC: 74 U/L (ref 0–45)
BILIRUB SERPL-MCNC: 0.4 MG/DL
BILIRUB SERPL-MCNC: 0.5 MG/DL
BUN SERPL-MCNC: 50.2 MG/DL (ref 8–23)
BUN SERPL-MCNC: 55.5 MG/DL (ref 8–23)
CALCIUM SERPL-MCNC: 7.7 MG/DL (ref 8.8–10.4)
CALCIUM SERPL-MCNC: 8.2 MG/DL (ref 8.8–10.4)
CHLORIDE SERPL-SCNC: 108 MMOL/L (ref 98–107)
CHLORIDE SERPL-SCNC: 109 MMOL/L (ref 98–107)
CREAT SERPL-MCNC: 2.37 MG/DL (ref 0.51–0.95)
CREAT SERPL-MCNC: 2.63 MG/DL (ref 0.51–0.95)
EGFRCR SERPLBLD CKD-EPI 2021: 17 ML/MIN/1.73M2
EGFRCR SERPLBLD CKD-EPI 2021: 19 ML/MIN/1.73M2
GLUCOSE SERPL-MCNC: 111 MG/DL (ref 70–99)
GLUCOSE SERPL-MCNC: 89 MG/DL (ref 70–99)
HCO3 SERPL-SCNC: 19 MMOL/L (ref 22–29)
HCO3 SERPL-SCNC: 19 MMOL/L (ref 22–29)
HOLD SPECIMEN: NORMAL
LACTATE SERPL-SCNC: 0.6 MMOL/L (ref 0.7–2)
LVEF ECHO: NORMAL
PATH REPORT.COMMENTS IMP SPEC: NORMAL
PATH REPORT.FINAL DX SPEC: NORMAL
PATH REPORT.GROSS SPEC: NORMAL
PATH REPORT.MICROSCOPIC SPEC OTHER STN: NORMAL
PATH REPORT.RELEVANT HX SPEC: NORMAL
POTASSIUM SERPL-SCNC: 4 MMOL/L (ref 3.4–5.3)
POTASSIUM SERPL-SCNC: 4.2 MMOL/L (ref 3.4–5.3)
PROT SERPL-MCNC: 5.2 G/DL (ref 6.4–8.3)
PROT SERPL-MCNC: 6.4 G/DL (ref 6.4–8.3)
SODIUM SERPL-SCNC: 139 MMOL/L (ref 135–145)
SODIUM SERPL-SCNC: 141 MMOL/L (ref 135–145)

## 2024-08-12 PROCEDURE — 84460 ALANINE AMINO (ALT) (SGPT): CPT | Performed by: INTERNAL MEDICINE

## 2024-08-12 PROCEDURE — 36415 COLL VENOUS BLD VENIPUNCTURE: CPT | Performed by: INTERNAL MEDICINE

## 2024-08-12 PROCEDURE — 97530 THERAPEUTIC ACTIVITIES: CPT | Mod: GP | Performed by: PHYSICAL THERAPIST

## 2024-08-12 PROCEDURE — 250N000013 HC RX MED GY IP 250 OP 250 PS 637: Performed by: NURSE PRACTITIONER

## 2024-08-12 PROCEDURE — 93306 TTE W/DOPPLER COMPLETE: CPT | Mod: 26 | Performed by: INTERNAL MEDICINE

## 2024-08-12 PROCEDURE — 250N000013 HC RX MED GY IP 250 OP 250 PS 637: Performed by: INTERNAL MEDICINE

## 2024-08-12 PROCEDURE — 99232 SBSQ HOSP IP/OBS MODERATE 35: CPT | Performed by: NURSE PRACTITIONER

## 2024-08-12 PROCEDURE — 93306 TTE W/DOPPLER COMPLETE: CPT

## 2024-08-12 PROCEDURE — 97140 MANUAL THERAPY 1/> REGIONS: CPT | Mod: GO | Performed by: OCCUPATIONAL THERAPIST

## 2024-08-12 PROCEDURE — 97161 PT EVAL LOW COMPLEX 20 MIN: CPT | Mod: GP | Performed by: PHYSICAL THERAPIST

## 2024-08-12 PROCEDURE — 83605 ASSAY OF LACTIC ACID: CPT

## 2024-08-12 PROCEDURE — 97116 GAIT TRAINING THERAPY: CPT | Mod: GP | Performed by: PHYSICAL THERAPIST

## 2024-08-12 PROCEDURE — 84155 ASSAY OF PROTEIN SERUM: CPT | Performed by: INTERNAL MEDICINE

## 2024-08-12 PROCEDURE — 120N000005 HC R&B MS OVERFLOW UMMC

## 2024-08-12 PROCEDURE — 82040 ASSAY OF SERUM ALBUMIN: CPT | Performed by: INTERNAL MEDICINE

## 2024-08-12 PROCEDURE — 97535 SELF CARE MNGMENT TRAINING: CPT | Mod: GO

## 2024-08-12 PROCEDURE — 250N000013 HC RX MED GY IP 250 OP 250 PS 637

## 2024-08-12 PROCEDURE — 36415 COLL VENOUS BLD VENIPUNCTURE: CPT

## 2024-08-12 PROCEDURE — 250N000013 HC RX MED GY IP 250 OP 250 PS 637: Performed by: STUDENT IN AN ORGANIZED HEALTH CARE EDUCATION/TRAINING PROGRAM

## 2024-08-12 RX ORDER — METOPROLOL TARTRATE 25 MG/1
25 TABLET, FILM COATED ORAL 2 TIMES DAILY
Status: DISCONTINUED | OUTPATIENT
Start: 2024-08-12 | End: 2024-08-13

## 2024-08-12 RX ORDER — METOPROLOL TARTRATE 1 MG/ML
5 INJECTION, SOLUTION INTRAVENOUS EVERY 5 MIN PRN
Status: DISCONTINUED | OUTPATIENT
Start: 2024-08-12 | End: 2024-08-15 | Stop reason: HOSPADM

## 2024-08-12 RX ADMIN — ISOSORBIDE MONONITRATE 60 MG: 20 TABLET ORAL at 19:38

## 2024-08-12 RX ADMIN — METHOCARBAMOL 500 MG: 500 TABLET ORAL at 08:05

## 2024-08-12 RX ADMIN — METOPROLOL TARTRATE 25 MG: 25 TABLET, FILM COATED ORAL at 19:38

## 2024-08-12 RX ADMIN — ASPIRIN 81 MG: 81 TABLET ORAL at 07:54

## 2024-08-12 RX ADMIN — ALLOPURINOL 100 MG: 100 TABLET ORAL at 07:54

## 2024-08-12 RX ADMIN — CYANOCOBALAMIN TAB 500 MCG 500 MCG: 500 TAB at 07:55

## 2024-08-12 RX ADMIN — ISOSORBIDE MONONITRATE 60 MG: 20 TABLET ORAL at 07:54

## 2024-08-12 RX ADMIN — CLOPIDOGREL BISULFATE 75 MG: 75 TABLET ORAL at 07:55

## 2024-08-12 RX ADMIN — LIDOCAINE 1 PATCH: 4 PATCH TOPICAL at 04:50

## 2024-08-12 RX ADMIN — PANTOPRAZOLE SODIUM 40 MG: 40 TABLET, DELAYED RELEASE ORAL at 19:38

## 2024-08-12 RX ADMIN — ACETAMINOPHEN 650 MG: 325 TABLET, FILM COATED ORAL at 08:04

## 2024-08-12 RX ADMIN — TIMOLOL MALEATE 1 DROP: 5 SOLUTION/ DROPS OPHTHALMIC at 07:54

## 2024-08-12 RX ADMIN — PANTOPRAZOLE SODIUM 40 MG: 40 TABLET, DELAYED RELEASE ORAL at 07:55

## 2024-08-12 RX ADMIN — METHOCARBAMOL 500 MG: 500 TABLET ORAL at 00:53

## 2024-08-12 ASSESSMENT — ACTIVITIES OF DAILY LIVING (ADL)
ADLS_ACUITY_SCORE: 32
ADLS_ACUITY_SCORE: 34
ADLS_ACUITY_SCORE: 32
ADLS_ACUITY_SCORE: 34
ADLS_ACUITY_SCORE: 32
ADLS_ACUITY_SCORE: 34
ADLS_ACUITY_SCORE: 32
ADLS_ACUITY_SCORE: 28
ADLS_ACUITY_SCORE: 34
ADLS_ACUITY_SCORE: 32
ADLS_ACUITY_SCORE: 34
ADLS_ACUITY_SCORE: 32
ADLS_ACUITY_SCORE: 34
ADLS_ACUITY_SCORE: 34

## 2024-08-12 NOTE — PROGRESS NOTES
[facility]  Patient is currently open to home care services with [facility]. The patient has RN PT services.  [facility]  and team have been notified of patient admission.   [facility] liaison will continue to follow patient during stay.  If appropriate provide orders to resume home care at time of discharge.

## 2024-08-12 NOTE — PLAN OF CARE
Neuro: A&Ox4; able to make needs known and calls appropriately  Cardiac: Afebrile, VSS. A fib low 100's-120's w/o c/o chest pain/palpitations   Respiratory: RA, O2 at 1-2 lpm HS d/t O2 destauration mid-80's  GI/: Voiding spontaneously. No BM this shift; last BM: 8/11  Diet/appetite: Tolerating Regular diet w/ good appetite; Denies nausea.  Activity: Up ad court w/ 1 assist  Pain: w/ intermittent c/o R chest pain radiating to the shoulder, pain managed by PRN Tylenol, Robaxin and Tramadol   Skin: No new deficits noted. Pericardial drain site, wdl, dressing c/d/I  Drains: Pericardial drain to suction, may place to water seal when ambulating  Lines: R and L PIV, saline locked, patent and intact  Plan: Continue POC and notify the team of any changes    Updates this shift:   - Possibly pre-renal SHAJI; 0 residual volume post-void  - Leave drain in place today, potential clamp/TTE tomorrow  -Pericarditis treatment with Dr. Echevarria deffered this time with poor kidney function and improving symptoms  - resumed Metoprolol BID, Metoprolol 5 mg IV for HR >130

## 2024-08-12 NOTE — PROGRESS NOTES
Care Management Follow Up    Length of Stay (days): 4    Expected Discharge Date: 08/13/2024     Concerns to be Addressed:       Patient plan of care discussed at interdisciplinary rounds: Yes    Anticipated Discharge Disposition:  (TBD)     Anticipated Discharge Services:  (TBD)  Anticipated Discharge DME:  (TBD)    Patient/family educated on Medicare website which has current facility and service quality ratings:    Education Provided on the Discharge Plan:    Patient/Family in Agreement with the Plan:      Referrals Placed by CM/SW:      [facility]  [address] [facility] [address]  P: [phone] ext1 adm or Admissions: [phone]   F: [phone]  8/12: Sent referral via epic to call SW. Spoke to Erik- beds are available.    Private pay costs discussed: Not applicable    Additional Information:  CHW spoke to pt about her TCU choices- she wants to go to [facility]. Her  was there and liked it and it is 2 miles from home.       Josephine Dsouza   6A/B/C Community Health Worker   Phone: [phone]

## 2024-08-12 NOTE — PLAN OF CARE
Neuro: A&O x 4. Afebrile. Pleasant affect. Calls appropriately. Slept well overnight.  Cardiac: SR, irregular rate, 70s-80s. SBP 120s-130s.  Respiratory: 2L NC applied for sleep - desats to mid 80s. Sating well on RA while awake. LS clear.   GI/: Urinary incontinence, ambulating to bathroom.   Diet/Appetite: Regular diet.  Skin: Scattered ecchymosis.   LDA: R/L PIV, SL. Pericardial tube to suction.  Activity: SBA with home walker.  Pain: Pain 4-5/10, right shoulder area, exacerbated by movement. Using hot packs, PRN acetaminophen, PRN robaxin, lidocaine patch.

## 2024-08-12 NOTE — PROGRESS NOTES
08/12/24 1000   Appointment Info   Signing Clinician's Name / Credentials (PT) Barbara Cotto, PT, DPT   Living Environment   People in Home spouse   Current Living Arrangements house   Home Accessibility stairs to enter home;stairs within home   Number of Stairs, Main Entrance 2   Stair Railings, Main Entrance none   Number of Stairs, Within Home, Primary seven   Stair Railings, Within Home, Primary railings on both sides of stairs   Transportation Anticipated family or friend will provide   Living Environment Comments Pt lives with spouse in multi level house. 2 DAVEY from garage (pt preferred access), no handrail. 0 DAVEY from front door. 7 steps up, 7 down in home. Bedroom/BR on upper level. Has tub/shower with shower chair and grab bars, and raised toilet seat.   Self-Care   Usual Activity Tolerance good   Current Activity Tolerance moderate  (to fair)   Regular Exercise Yes   Activity/Exercise Type walking  (PT 1x/wk)   Exercise Amount/Frequency 3-5 times/wk   Equipment Currently Used at Home cane, straight;grab bar, toilet;grab bar, tub/shower;shower chair;walker, rolling;wheelchair, manual;walker, standard   Fall history within last six months no   Activity/Exercise/Self-Care Comment Pt reports IND in ADLs prior to admission and that pt has a 4WW at home that pt uses   General Information   Onset of Illness/Injury or Date of Surgery 08/08/24   Referring Physician Rodrick Garcia MD   Patient/Family Therapy Goals Statement (PT) Regain strength, IND.   Pertinent History of Current Problem (include personal factors and/or comorbidities that impact the POC) Tessa Mansfield is a 89 year old female who presents to the CICU following a Watchman device placement. 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 Admitted to the CICU due to development of a pericardial effusion  following WATCHMAN placement. Because subsequent TTE suggested worsening of effusion, patient underwent pericardiocentesis on 8/8 w/ 220 mL and drain placement.   Existing Precautions/Restrictions fall;cardiac;weight bearing  (activity orders: up in chair:  AFTER the Bedrest is completed, patient has dangled legs and patient has ambulated, then the patient may be up in a chair.)   Weight-Bearing Status - LUE   (10# x 7 days post Watchman procedure)   Weight-Bearing Status - RUE   (10# x 7 days post Watchman procedure)   Cognition   Cognitive Status Comments cooperative, demonstrates appropriate safety awareness   Pain Assessment   Patient Currently in Pain No   Posture    Posture Comments slightly reduced upright posture   Range of Motion (ROM)   ROM Comment slightly reduced hip flexoin AROM   Strength (Manual Muscle Testing)   Strength Comments functionally reduced; see below   Bed Mobility   Comment, (Bed Mobility) CGA-SBA supine>sit, slightly less stable with respect to core strength and slightly reduced control with initial forward lean   Transfers   Comment, (Transfers) CGA-slight ANDREWS sit<>stand, 4WW   Gait/Stairs (Locomotion)   Comment, (Gait/Stairs) Ambulated slowly with CGA, 4WW and reduced upright posture x 20 ft. Declined feeling able to trial stairs.   Balance   Balance Comments benefits from staff assist and 4WW presently for all standing balance tasks, also with sit<>stand   Clinical Impression   Criteria for Skilled Therapeutic Intervention Yes, treatment indicated   PT Diagnosis (PT) impaired mobility   Influenced by the following impairments fatigue, pain, reduced activity tolerance, balance, strength, ROM   Functional limitations due to impairments below baseline bed mobility, transfers, gait   Clinical Presentation (PT Evaluation Complexity) stable   Clinical Presentation Rationale clinical judgement, [facility]   Clinical Decision Making (Complexity) low complexity   Planned Therapy Interventions (PT)  balance training;bed mobility training;gait training;home exercise program;neuromuscular re-education;patient/family education;postural re-education;ROM (range of motion);stair training;strengthening;stretching;transfer training;risk factor education;home program guidelines;progressive activity/exercise   Risk & Benefits of therapy have been explained evaluation/treatment results reviewed;care plan/treatment goals reviewed;risks/benefits reviewed;current/potential barriers reviewed;participants voiced agreement with care plan;participants included;patient   PT Total Evaluation Time   PT Eval, Low Complexity Minutes (59278) 5   Physical Therapy Goals   PT Frequency 5x/week   PT Predicted Duration/Target Date for Goal Attainment 08/19/24   PT Goals Bed Mobility;Gait;Transfers;Stairs   PT: Bed Mobility Independent;Supine to/from sit;Rolling;Bridging;Within precautions   PT: Transfers Modified independent;Independent;Sit to/from stand;Bed to/from chair;Assistive device;Within precautions   PT: Gait Modified independent;Independent;Within precautions;150 feet   PT: Stairs Modified independent;2 stairs;7 stairs;Within precautions  (2 stairs no rails, 7 stairs B rails)   PT Discharge Planning   PT Plan as able WC ride to gym to trial stairs, progress gait with 4WW. Strength ex as appropriate   PT Discharge Recommendation (DC Rec) Transitional Care Facility   PT Rationale for DC Rec At this time pt is demonstrating below baseline functional status. She is requiring more assist than baseline for mobilization and at times is unable to ambulate safe household gait distances. She has stairs at home but currently  is reporting not feeling able to attempt in PT. Based on this, pt would benefit from a rehab stay presently. Pending LOS and progress, may progress to home discharge with assist and home or OP PT.   PT Brief overview of current status CGA-SBA supine>sit, CGA-slight ANDREWS sit<>stand, 4WW. CGA for gait with 4WW, 50 ft,  with rest breaks needed between walking bouts.   Total Session Time   Timed Code Treatment Minutes 40   Total Session Time (sum of timed and untimed services) 45

## 2024-08-12 NOTE — PROGRESS NOTES
Interventional Cardiology Progress Note      Assessment & Plan:  Tessa Mansfield is a 89 year old female who was admitted following a planned Watchman device placement. 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 Admitted to the CICU due to development of a pericardial effusion following WATCHMAN placement. Because subsequent TTE suggested worsening of effusion, patient underwent pericardiocentesis on 8/8 w/ 220 mL and drain placement.     Today's Update:  - Bladder scan and straight cath if needed, possibly pre-renal SHAJI  - Leave drain in place today, potential clamp/TTE tomorrow  - Resume PTA Metoprolol at lower rate  - Discussed pericarditis treatment with Dr. Echevarria. With poor kidney function and improving symptoms, will defer treatment at this time    # Paroxysmal atrial fibrillation  # intolerance to AC due to GIB s/p Watchman (8/4/24)  # pericardial effusion s/p pericardiocentesis 8/8  Patient with a CHADsVASC 5 and HASBLED of 5. She has had difficulty tolerating eliquis in the past due to recurrent GIB in the setting of AVMs.  S/p 31mm WATCHMAN FLX device placement on August 8, 2024  Watchman procedure complicated by new pericardial effusion without tamponade. S/p pericardiocentesis 8/8 with 220 ml off. There was no contrast extravasation noted. Patient admitted for post procedural monitoring with pericardial drain in place.   Trial of drain clamp overnight 8/9 and 8/10 unsuccessful. No new drain output documented and patient with slightly larger effusion on 8/11 echo.  - Return to cath lab 8/11 for drain manipulation and 100 ml yellow drainage.    - Leave drain in place today 8/12   - If decreasing, will clamp 8/13 AM and repeat TTE to assess for re-accumulation  - Plavix 75 mg daily, ASA stopped by Dr. Garcia recs 8/11  - Hold PTA metoprolol tartrate 50 mg BID  - slowly reintroduce as tolerated  - SBE prophylaxis x 6 months post LAAO   - Follow-up structural [place]ON 1 week and 45 days post procedure  - 45 days following Watchman device implantation, patient will return for MICHELL to evaluate endothelialization of the device. If adequate seal is assessed (<5mm), antiplatelet therapy with clopidogrel will be continued for total of 6 months from implantation date, with aspirin then continuing lifelong.    - Telemetry  - AF 8/12, resume home metoprolol with PRN IV for sustained RVR     # CAD s/p multiple PCIs  # HLD  Extensive CAD history with multiple PCIs in the past. Most recently patient had a PCI to the mLAD on 11/26/23.  - Plavix as above  - Continue PTA rosuvastatin 20 mg daily when LFTs normalize     # HTN  # Chronic heart failure with preserved ejection fraction  Dilated IVC on 8/10 echo.   - Volume status: hypervolemic; start IV Lasix 40 mg daily, extra dose if required  - IV diuresis as tolerated  - PTA isosorbide mononitrate 60 mg BID  - PTA amlodipine 10 mg daily - slowly reintroduce as tolerated  - Daily weights  - I/O     #SSS s/p dual chamber PPM  Last device check 5/28/24 with 6.6% AF burden. AP 27.5%,  0.1%  - OP EP follow up     # Concern for infection  # Leukocytosis  Started on abx on 8/8 due to elevated LA and WBC. Currently without localizing symptoms.  - LA normalized on 8/10  - Monitor off abx     # SHAJI on CKD IV  Baseline Cr 1.7-2.0. Cr 1.71 on admission.  - Crt 2.63 (2.58), BUN 55, GFR 17  - Avoid nephrotoxic meds  - Bladder scan to assess for retention  - Renally dose meds  - Gentle diuresis as above  - May need nephrology involvement if not improving     # Elevated LFTs, improving  - Holding statin as above  - Daily CMP     # acute blood loss anemia - stable  # anemia of chronic disease  # history of GIB 2/2 AVMs  Baseline Hgb ~7.5-9.0. Presented with Hgb 8.8, given 1 U pRBC during the case. Currently without overt bleeding or high pericardial drain  "output.  - Off AC due to GIB in addition to concern for pericardial effusion  - Presently without overt blood losses  - CBC daily     # L groin hematoma:  Following pull of art sheath 8/8. Improved with local pressure and fem stop  - US without evidence of active bleeding and pseudoaneurysm  - CTM     # Discharge Planning  Current PT/OT recs TCU or home with assist. Patient states she prefers rehab because she feels very deconditioned.   - Appreciate SW/RNCC assist in dispo planning     Diet: regular  Activity: as tolerated  Code Status: full  Disposition: current recs: TCU or home with assist, dispo pending resolution of effusion, hypervolemia, and SHAJI    Patient discussed w/ Dr. Echevarria.    Medically Ready for Discharge: Anticipated Tomorrow    JI Marte, CNP  [facility]  Interventional Cardiology  [phone]    Medical Decision Making       40 MINUTES SPENT BY ME on the date of service doing chart review, history, exam, documentation & further activities per the note.      Interval History:  NAEO, telemetry, labs, vital signs and nursing notes reviewed.  Minimum documented output but cath report notes additional 100 ml removed 8/11 PM.   On exam, patient feels improved. States c/p is diminishing, mild pain with deep inspiration  Most recent vital signs:  /63 (BP Location: Right arm, Cuff Size: Adult Regular)   Pulse 80   Temp 97.8  F (36.6  C) (Oral)   Resp 12   Ht 1.676 m (5' 6\")   Wt 68.7 kg (151 lb 8 oz)   SpO2 96%   BMI 24.45 kg/m    Temp:  [97.4  F (36.3  C)-99.2  F (37.3  C)] 97.8  F (36.6  C)  Pulse:  [74-91] 80  Resp:  [12-18] 12  BP: (101-136)/(49-69) 128/63  SpO2:  [90 %-96 %] 96 %  Wt Readings from Last 2 Encounters:   08/12/24 68.7 kg (151 lb 8 oz)   08/05/24 67.9 kg (149 lb 11.2 oz)       Intake/Output Summary (Last 24 hours) at 8/12/2024 0711  Last data filed at 8/12/2024 0500  Gross per 24 hour   Intake 350 ml   Output 1294 ml   Net -944 ml "       Physical exam:  General: Pleasant elderly female. Appears comfortable and in no acute distress. Alert and interactive  HEENT: Normocephalic, atraumatic. No scleral icterus or injection  Neck: JVP not elevated  CARDIAC: Regular rate and rhythm, no m/r/g appreciated. Peripheral pulses 2+  RESP: Normal work of breathing on room air without use of accessory breathing muscles. Clear to auscultation in all fields. No wheezes, rhonchi or crackles appreciated.  GI: No abdominal distention. Soft and nontender.   EXTREMITIES: Without lower extremity edema. No cyanosis or clubbing. Warm and well perfused. No venous stasis changes.   SKIN: No acute lesions appreciated. Warm and dry to touch. Pericardial drain insertion site CDI, no erythema or drainage  NEURO: Alert and oriented X3, CN II-XII grossly intact, no focal neurological deficits noted, normal speech  PSYCH: Mood and affect are appropriate    Labs (Past three days):  CBC  Recent Labs   Lab 08/11/24  0454 08/10/24  0019 08/09/24  0412 08/09/24  0019   WBC 15.4* 17.1* 17.4* 14.8*   RBC 2.75* 3.05* 3.08* 3.13*   HGB 8.1* 9.0* 9.0* 9.3*   HCT 27.0* 29.5* 29.2* 30.0*   MCV 98 97 95 96   MCH 29.5 29.5 29.2 29.7   MCHC 30.0* 30.5* 30.8* 31.0*   RDW 18.7* 18.6* 17.6* 17.3*   PLT 80* 92* 92* 91*     BMP  Recent Labs   Lab 08/12/24  0442 08/11/24  1627 08/11/24  0454 08/10/24  1546 08/10/24  0019 08/09/24  1517 08/09/24  0621 08/09/24  0412 08/08/24  0616 08/05/24  1305    137 137 139 140   < >  --  143   < > 144   POTASSIUM 4.0 4.7 4.3 4.5 4.6   < >  --  4.3   < > 4.6   CHLORIDE 109* 107 109* 109* 111*   < >  --  112*   < > 111*   CO2 19* 19* 18* 18* 18*   < >  --  14*   < > 22   ANIONGAP 11 11 10 12 11   < >  --  17*   < > 11   GLC 89 88 93 97 131*   < >  --  177*   < > 93   BUN 55.5* 54.7* 52.9* 54.5* 51.9*   < >  --  45.0*   < > 47.8*   CR 2.63* 2.58* 2.39* 2.30* 2.19*   < >  --  1.76*   < > 1.69*   GFRESTIMATED 17* 17* 19* 20* 21*   < >  --  28*   < > 29*   ALEXANDRO  7.7* 8.1* 8.0* 8.2* 7.8*   < >  --  8.1*   < > 8.8   MAG  --   --  2.0  --  2.2  --   --  2.1  --   --    PHOS  --   --   --   --   --   --  3.4  --   --  2.6    < > = values in this interval not displayed.     Troponins:   Lab Results   Component Value Date    TROPI 0.021 09/15/2013    TROPI 0.034 09/15/2013    TROPI 0.015 09/15/2013    TROPI 0.210 (HH) 07/24/2013    TROPI 0.054 (H) 07/23/2013    TROPONIN 0.00 09/15/2013    TROPONIN 0.02 07/22/2013    TROPONIN 0.03 09/17/2011    TROPONIN 0.00 02/13/2010    TROPONIN 0.00 11/09/2009        INR  Recent Labs   Lab 08/10/24  0521 08/09/24  0019 08/08/24  0651 08/05/24  1306   INR 1.43* 1.31* 1.14 1.22*     Liver panel  Recent Labs   Lab 08/12/24  0442 08/11/24  1627 08/11/24  0454 08/10/24  1546   PROTTOTAL 5.2* 5.8* 5.2* 5.8*   ALBUMIN 2.9* 3.3* 3.1* 3.4*   BILITOTAL 0.4 0.5 0.4 0.4   ALKPHOS 112 124 108 124   AST 74* 111* 132* 219*   ALT 70* 94* 93* 145*       Imaging/procedure results:  EKG 12 Lead 8/8/24:        Echocardiogram 8/11/24:  Interpretation Summary  Normal biventricular function.  Small sized circumferential pericaridal effusion is present, measuring 0.9 cm  posteriorly. No chamber invagination.  ______________________________________________________________________________  Left Ventricle  Global and regional left ventricular function is normal with an EF of 55-60%.     Right Ventricle  Global right ventricular function is normal.     Pericardium  Small sized circumferential pericaridal effusion is present, measuring 0.9 cm  posteriorly. No chamber invagination.     Miscellaneous  A left pleural effusion is present.     Compared to Previous Study  This study was compared with the study from 8.10.24 . Pericaridal effusion is  slightly larger.     Coronary Angiogram 11/6/23:    Prox LAD to Mid LAD lesion is 40% stenosed.    Ost LAD to Prox LAD lesion is 50% stenosed.    Prox Cx to Mid Cx lesion is 55% stenosed.    1st Diag lesion is 85% stenosed.    2nd  Diag lesion is 70% stenosed.    RPDA-2 lesion is 50% stenosed.    RPDA-1 lesion is 65% stenosed.    Mid LAD lesion is 70% stenosed.     S/p ALVAREZ to mid LAD

## 2024-08-13 ENCOUNTER — APPOINTMENT (OUTPATIENT)
Dept: OCCUPATIONAL THERAPY | Facility: CLINIC | Age: 89
DRG: 274 | End: 2024-08-13
Attending: INTERNAL MEDICINE
Payer: COMMERCIAL

## 2024-08-13 ENCOUNTER — APPOINTMENT (OUTPATIENT)
Dept: CARDIOLOGY | Facility: CLINIC | Age: 89
DRG: 274 | End: 2024-08-13
Attending: NURSE PRACTITIONER
Payer: COMMERCIAL

## 2024-08-13 ENCOUNTER — APPOINTMENT (OUTPATIENT)
Dept: PHYSICAL THERAPY | Facility: CLINIC | Age: 89
DRG: 274 | End: 2024-08-13
Attending: INTERNAL MEDICINE
Payer: COMMERCIAL

## 2024-08-13 LAB
ALBUMIN SERPL BCG-MCNC: 3.1 G/DL (ref 3.5–5.2)
ALP SERPL-CCNC: 110 U/L (ref 40–150)
ALT SERPL W P-5'-P-CCNC: 55 U/L (ref 0–50)
ANION GAP SERPL CALCULATED.3IONS-SCNC: 12 MMOL/L (ref 7–15)
AST SERPL W P-5'-P-CCNC: 46 U/L (ref 0–45)
BACTERIA PCAR CULT: NO GROWTH
BILIRUB SERPL-MCNC: 0.5 MG/DL
BUN SERPL-MCNC: 44.6 MG/DL (ref 8–23)
CALCIUM SERPL-MCNC: 7.8 MG/DL (ref 8.8–10.4)
CHLORIDE SERPL-SCNC: 108 MMOL/L (ref 98–107)
CREAT SERPL-MCNC: 2.25 MG/DL (ref 0.51–0.95)
CRP SERPL-MCNC: 97.3 MG/L
EGFRCR SERPLBLD CKD-EPI 2021: 21 ML/MIN/1.73M2
ERYTHROCYTE [DISTWIDTH] IN BLOOD BY AUTOMATED COUNT: 18.6 % (ref 10–15)
GLUCOSE SERPL-MCNC: 85 MG/DL (ref 70–99)
GRAM STAIN RESULT: NORMAL
GRAM STAIN RESULT: NORMAL
HCO3 SERPL-SCNC: 18 MMOL/L (ref 22–29)
HCT VFR BLD AUTO: 27.8 % (ref 35–47)
HGB BLD-MCNC: 8.4 G/DL (ref 11.7–15.7)
LACTATE SERPL-SCNC: 0.7 MMOL/L (ref 0.7–2)
LVEF ECHO: NORMAL
LVEF ECHO: NORMAL
MCH RBC QN AUTO: 29.7 PG (ref 26.5–33)
MCHC RBC AUTO-ENTMCNC: 30.2 G/DL (ref 31.5–36.5)
MCV RBC AUTO: 98 FL (ref 78–100)
PLATELET # BLD AUTO: 81 10E3/UL (ref 150–450)
POTASSIUM SERPL-SCNC: 4 MMOL/L (ref 3.4–5.3)
PROT SERPL-MCNC: 5.5 G/DL (ref 6.4–8.3)
RBC # BLD AUTO: 2.83 10E6/UL (ref 3.8–5.2)
SODIUM SERPL-SCNC: 138 MMOL/L (ref 135–145)
WBC # BLD AUTO: 10.5 10E3/UL (ref 4–11)

## 2024-08-13 PROCEDURE — 80053 COMPREHEN METABOLIC PANEL: CPT | Performed by: INTERNAL MEDICINE

## 2024-08-13 PROCEDURE — 250N000013 HC RX MED GY IP 250 OP 250 PS 637: Performed by: NURSE PRACTITIONER

## 2024-08-13 PROCEDURE — 86140 C-REACTIVE PROTEIN: CPT | Performed by: NURSE PRACTITIONER

## 2024-08-13 PROCEDURE — 93325 DOPPLER ECHO COLOR FLOW MAPG: CPT

## 2024-08-13 PROCEDURE — 97535 SELF CARE MNGMENT TRAINING: CPT | Mod: GO | Performed by: OCCUPATIONAL THERAPIST

## 2024-08-13 PROCEDURE — 250N000013 HC RX MED GY IP 250 OP 250 PS 637: Performed by: STUDENT IN AN ORGANIZED HEALTH CARE EDUCATION/TRAINING PROGRAM

## 2024-08-13 PROCEDURE — 99232 SBSQ HOSP IP/OBS MODERATE 35: CPT | Performed by: NURSE PRACTITIONER

## 2024-08-13 PROCEDURE — 250N000013 HC RX MED GY IP 250 OP 250 PS 637

## 2024-08-13 PROCEDURE — 93306 TTE W/DOPPLER COMPLETE: CPT | Mod: 26 | Performed by: STUDENT IN AN ORGANIZED HEALTH CARE EDUCATION/TRAINING PROGRAM

## 2024-08-13 PROCEDURE — 250N000013 HC RX MED GY IP 250 OP 250 PS 637: Performed by: INTERNAL MEDICINE

## 2024-08-13 PROCEDURE — 83605 ASSAY OF LACTIC ACID: CPT

## 2024-08-13 PROCEDURE — 36415 COLL VENOUS BLD VENIPUNCTURE: CPT | Performed by: NURSE PRACTITIONER

## 2024-08-13 PROCEDURE — 97530 THERAPEUTIC ACTIVITIES: CPT | Mod: GP | Performed by: PHYSICAL THERAPIST

## 2024-08-13 PROCEDURE — 93306 TTE W/DOPPLER COMPLETE: CPT

## 2024-08-13 PROCEDURE — 93308 TTE F-UP OR LMTD: CPT | Mod: 26 | Performed by: INTERNAL MEDICINE

## 2024-08-13 PROCEDURE — 120N000005 HC R&B MS OVERFLOW UMMC

## 2024-08-13 PROCEDURE — 93325 DOPPLER ECHO COLOR FLOW MAPG: CPT | Mod: 26 | Performed by: INTERNAL MEDICINE

## 2024-08-13 PROCEDURE — 36415 COLL VENOUS BLD VENIPUNCTURE: CPT

## 2024-08-13 PROCEDURE — 250N000011 HC RX IP 250 OP 636: Performed by: NURSE PRACTITIONER

## 2024-08-13 PROCEDURE — 85014 HEMATOCRIT: CPT | Performed by: NURSE PRACTITIONER

## 2024-08-13 PROCEDURE — 93321 DOPPLER ECHO F-UP/LMTD STD: CPT | Mod: 26 | Performed by: INTERNAL MEDICINE

## 2024-08-13 RX ORDER — PROCHLORPERAZINE MALEATE 5 MG
5 TABLET ORAL EVERY 6 HOURS PRN
Status: DISCONTINUED | OUTPATIENT
Start: 2024-08-13 | End: 2024-08-15 | Stop reason: HOSPADM

## 2024-08-13 RX ORDER — METOPROLOL TARTRATE 25 MG/1
50 TABLET, FILM COATED ORAL 2 TIMES DAILY
Status: DISCONTINUED | OUTPATIENT
Start: 2024-08-13 | End: 2024-08-15 | Stop reason: HOSPADM

## 2024-08-13 RX ORDER — PROCHLORPERAZINE 25 MG
12.5 SUPPOSITORY, RECTAL RECTAL EVERY 12 HOURS PRN
Status: DISCONTINUED | OUTPATIENT
Start: 2024-08-13 | End: 2024-08-15 | Stop reason: HOSPADM

## 2024-08-13 RX ORDER — ISOSORBIDE MONONITRATE 30 MG/1
90 TABLET, EXTENDED RELEASE ORAL DAILY
Status: DISCONTINUED | OUTPATIENT
Start: 2024-08-14 | End: 2024-08-15 | Stop reason: HOSPADM

## 2024-08-13 RX ORDER — FUROSEMIDE 40 MG
40 TABLET ORAL DAILY
Status: DISCONTINUED | OUTPATIENT
Start: 2024-08-13 | End: 2024-08-15 | Stop reason: HOSPADM

## 2024-08-13 RX ORDER — AMOXICILLIN 250 MG
1 CAPSULE ORAL 2 TIMES DAILY PRN
Status: DISCONTINUED | OUTPATIENT
Start: 2024-08-13 | End: 2024-08-15 | Stop reason: HOSPADM

## 2024-08-13 RX ADMIN — PROCHLORPERAZINE EDISYLATE 5 MG: 5 INJECTION INTRAMUSCULAR; INTRAVENOUS at 11:21

## 2024-08-13 RX ADMIN — PANTOPRAZOLE SODIUM 40 MG: 40 TABLET, DELAYED RELEASE ORAL at 19:38

## 2024-08-13 RX ADMIN — PANTOPRAZOLE SODIUM 40 MG: 40 TABLET, DELAYED RELEASE ORAL at 08:20

## 2024-08-13 RX ADMIN — CYANOCOBALAMIN TAB 500 MCG 500 MCG: 500 TAB at 08:20

## 2024-08-13 RX ADMIN — TIMOLOL MALEATE 1 DROP: 5 SOLUTION/ DROPS OPHTHALMIC at 08:15

## 2024-08-13 RX ADMIN — ISOSORBIDE MONONITRATE 60 MG: 20 TABLET ORAL at 08:17

## 2024-08-13 RX ADMIN — CLOPIDOGREL BISULFATE 75 MG: 75 TABLET ORAL at 08:20

## 2024-08-13 RX ADMIN — TRAMADOL HYDROCHLORIDE 25 MG: 50 TABLET, COATED ORAL at 00:37

## 2024-08-13 RX ADMIN — ONDANSETRON 4 MG: 2 INJECTION INTRAMUSCULAR; INTRAVENOUS at 10:49

## 2024-08-13 RX ADMIN — METOPROLOL TARTRATE 50 MG: 25 TABLET, FILM COATED ORAL at 08:17

## 2024-08-13 RX ADMIN — METOPROLOL TARTRATE 50 MG: 25 TABLET, FILM COATED ORAL at 19:37

## 2024-08-13 RX ADMIN — SENNOSIDES AND DOCUSATE SODIUM 1 TABLET: 8.6; 5 TABLET ORAL at 23:22

## 2024-08-13 RX ADMIN — FUROSEMIDE 40 MG: 40 TABLET ORAL at 08:20

## 2024-08-13 RX ADMIN — ACETAMINOPHEN 650 MG: 325 TABLET, FILM COATED ORAL at 03:47

## 2024-08-13 RX ADMIN — TRAMADOL HYDROCHLORIDE 25 MG: 50 TABLET, COATED ORAL at 08:22

## 2024-08-13 RX ADMIN — LIDOCAINE 1 PATCH: 4 PATCH TOPICAL at 03:53

## 2024-08-13 RX ADMIN — ALLOPURINOL 100 MG: 100 TABLET ORAL at 08:18

## 2024-08-13 ASSESSMENT — ACTIVITIES OF DAILY LIVING (ADL)
ADLS_ACUITY_SCORE: 34

## 2024-08-13 NOTE — PLAN OF CARE
Temp: 98.5  F (36.9  C) Temp src: Oral BP: 115/67 Pulse: 80   Resp: 16 SpO2: 94 % O2 Device: None (Room air)       Neuro: A&O x4. Able to make needs known - calls appropriately.   Cardiac: Tele in place, Atrial Fib - 80's-100's. Pt became hypotensive and symptomatic while getting up with PT today. Pt stabilized after lying flat. Afebrile.   Resp: VSS on RA. SOB when ambulating back and forth to the bathroom.  GI/: Urinating well getting up to the bathroom. No BM for writer this shift. Pt got very nauseous after BP drop and dizziness this AM with PT - zofran and compazine given with relief.   Skin: No new deficits noted  LDAs: R & L PIV in place SL. Pericardial drain pulled this evening per provider after pt passed her clamp test - pt tolerated well.  Activity: Up w/ SBA & home walker.      Plan: Continue to monitor and follow POC. Notify CSI with changes.

## 2024-08-13 NOTE — PROGRESS NOTES
D:pt alfonso lungs clear/diminished sat's 93% on RA, denies discomfort  A:pt had 5cc of serosanguinous out of pericardial tube  P:possible clamp tube tomorrow

## 2024-08-13 NOTE — PROGRESS NOTES
Care Management Follow Up    Length of Stay (days): 5    Expected Discharge Date: 08/13/2024     Concerns to be Addressed:       Patient plan of care discussed at interdisciplinary rounds: Yes    Anticipated Discharge Disposition:  (TBD)     Anticipated Discharge Services:  (TBD)  Anticipated Discharge DME:  (TBD)    Patient/family educated on Medicare website which has current facility and service quality ratings:    Education Provided on the Discharge Plan:    Patient/Family in Agreement with the Plan:      Referrals Placed by CM/SW:      [facility]  [address] [facility] [address]  P: [phone] ext1 adm or Admissions: [phone]   F: [phone]  8/12: Sent referral via epic to call [facility]. Spoke to Erik- beds are available.  8/13: spoke to Erik- he said she is at the top of his list and he will call [facility]. CHW let him know she will be ready late this afternoon.    Private pay costs discussed: Not applicable    Additional Information:  Received update at discharge rounds that pt will be ready late this afternoon.      Josephine Dsouza   6A/B/C Community Health Worker   Phone: [phone]

## 2024-08-13 NOTE — PROGRESS NOTES
Interventional Cardiology Progress Note      Assessment & Plan:  Tessa Mansfield is a 89 year old female who was admitted following a planned Watchman device placement. 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 Admitted to the CICU due to development of a pericardial effusion following WATCHMAN placement. Because subsequent TTE suggested worsening of effusion, patient underwent pericardiocentesis on 8/8 w/ 220 mL and drain placement.     Today's Update:  - Clamp drain this AM, repeat TTE this afternoon, remove if not reaccumulating  - Continue to work with therapies. If drain removed today, medically ready for TCU  - Increase BB to PTA dose  - Resume PTA diuretic  - Increase imdur for chronic chest pain    # Paroxysmal atrial fibrillation  # intolerance to AC due to GIB s/p Watchman (8/4/24)  # pericardial effusion s/p pericardiocentesis 8/8  Patient with a CHADsVASC 5 and HASBLED of 5. She has had difficulty tolerating eliquis in the past due to recurrent GIB in the setting of AVMs.  S/p 31mm WATCHMAN FLX device placement on August 8, 2024  Watchman procedure complicated by new pericardial effusion without tamponade. S/p pericardiocentesis 8/8 with 220 ml off. There was no contrast extravasation noted. Patient admitted for post procedural monitoring with pericardial drain in place.   Trial of drain clamp overnight 8/9 and 8/10 unsuccessful. No new drain output documented and patient with slightly larger effusion on 8/11 echo.  - Return to cath lab 8/11 for drain manipulation and 100 ml yellow drainage.               - 27 mL output since 8/11,               - Clamp 8/13 AM and repeat TTE to assess for re-accumulation  - Plavix 75 mg daily, ASA stopped by Dr. Radha mackenzie 8/11  - Resume PTA metoprolol tartrate 50 mg BID   - SBE prophylaxis x 6 months post LAAO   -  Follow-up structural NAPOLEON 1 week and 45 days post procedure  - 45 days following Watchman device implantation, patient will return for MICHELL to evaluate endothelialization of the device. If adequate seal is assessed (<5mm), antiplatelet therapy with clopidogrel will be continued for total of 6 months from implantation date, with aspirin then continuing lifelong.    - Telemetry  - Discussed chest pain with patient. States it is worse with inspiration but feels similar to chronic pain she has had for years. Discussed treating potential pericarditis with Dr. Echevarria 8/12, limited options due to kidney function. Since pain feels similar to prior, will defer     # CAD s/p multiple PCIs  # HLD  Extensive CAD history with multiple PCIs in the past. Most recently patient had a PCI to the mLAD on 11/26/23.  - Plavix as above  - Increase PTA Imdur to 90 daily  - Continue PTA rosuvastatin 20 mg daily when LFTs normalize     # HTN  # Chronic heart failure with preserved ejection fraction  Dilated IVC on 8/10 echo.   - Volume status: euvolemic start PO Lasix 40 mg daily  - PTA amlodipine 10 mg daily - slowly reintroduce as tolerated  - Daily weights  - I/O     #SSS s/p dual chamber PPM  Last device check 5/28/24 with 6.6% AF burden. AP 27.5%,  0.1%  - OP EP follow up     # Concern for infection  # Leukocytosis  Started on abx on 8/8 due to elevated LA and WBC. Currently without localizing symptoms.  - LA normalized on 8/10  - Monitor off abx  - Daily CBC     # SHAJI on CKD IV, improving  Baseline Cr 1.7-2.0. Cr 1.71 on admission.  - Crt 2.25 (2.37), BUN 44, GFR 21  - Avoid nephrotoxic meds  - Bladder scan to assess for retention  - Renally dose meds  - Gentle diuresis as above  - May need nephrology involvement if not improving     # Elevated LFTs, improving  - Holding statin as above  - Daily CMP     # acute blood loss anemia - stable  # anemia of chronic disease  # history of GIB 2/2 AVMs  Baseline Hgb ~7.5-9.0. Presented with  "Hgb 8.8, given 1 U pRBC during the case. Currently without overt bleeding or high pericardial drain output.  - Off AC due to GIB in addition to concern for pericardial effusion  - Presently without overt blood losses  - CBC daily     # L groin hematoma:  Following pull of art sheath 8/8. Improved with local pressure and fem stop  - US without evidence of active bleeding and pseudoaneurysm  - CTM     # Discharge Planning  Current PT/OT recs TCU or home with assist. Patient states she prefers rehab because she feels very deconditioned.   - Appreciate SW/RNCC assist in dispo planning     Diet: regular  Activity: as tolerated  Code Status: full  Disposition: current recs: TCU or home with assist    Patient discussed w/ Dr. Echevarria.    Medically Ready for Discharge: Anticipated Today    JI Marte, CNP  [facility]  Interventional Cardiology  [phone]    Medical Decision Making       40 MINUTES SPENT BY ME on the date of service doing chart review, history, exam, documentation & further activities per the note.      Interval History:  NAEO, telemetry, labs, vital signs and nursing notes reviewed.    Most recent vital signs:  /83 (BP Location: Right arm)   Pulse 115   Temp 98.3  F (36.8  C) (Oral)   Resp 16   Ht 1.676 m (5' 6\")   Wt 68.7 kg (151 lb 8 oz)   SpO2 95%   BMI 24.45 kg/m    Temp:  [97.6  F (36.4  C)-98.8  F (37.1  C)] 98.3  F (36.8  C)  Pulse:  [] 115  Resp:  [16-18] 16  BP: (117-155)/(67-98) 117/83  SpO2:  [93 %-98 %] 95 %  Wt Readings from Last 2 Encounters:   08/12/24 68.7 kg (151 lb 8 oz)   08/05/24 67.9 kg (149 lb 11.2 oz)       Intake/Output Summary (Last 24 hours) at 8/13/2024 0730  Last data filed at 8/13/2024 0357  Gross per 24 hour   Intake 760 ml   Output 1957 ml   Net -1197 ml       Physical exam:  General: Pleasant elderly female. Appears comfortable and in no acute distress. Alert and interactive  HEENT: Normocephalic, atraumatic. No " scleral icterus or injection  Neck: JVP not elevated  CARDIAC: Regular rate and rhythm, no m/r/g appreciated. Peripheral pulses 2+  RESP: Normal work of breathing on room air without use of accessory breathing muscles. Clear to auscultation in all fields. No wheezes, rhonchi or crackles appreciated.  GI: No abdominal distention. Soft and nontender.   EXTREMITIES: Without lower extremity edema. No cyanosis or clubbing. Warm and well perfused. No venous stasis changes.   SKIN: No acute lesions appreciated. Warm and dry to touch. Pericardial drain CDI  NEURO: Alert and oriented X3, CN II-XII grossly intact, no focal neurological deficits noted, normal speech  PSYCH: Mood and affect are appropriate    Labs (Past three days):  CBC  Recent Labs   Lab 08/11/24  0454 08/10/24  0019 08/09/24  0412 08/09/24  0019   WBC 15.4* 17.1* 17.4* 14.8*   RBC 2.75* 3.05* 3.08* 3.13*   HGB 8.1* 9.0* 9.0* 9.3*   HCT 27.0* 29.5* 29.2* 30.0*   MCV 98 97 95 96   MCH 29.5 29.5 29.2 29.7   MCHC 30.0* 30.5* 30.8* 31.0*   RDW 18.7* 18.6* 17.6* 17.3*   PLT 80* 92* 92* 91*     BMP  Recent Labs   Lab 08/13/24  0523 08/12/24  1550 08/12/24  0442 08/11/24  1627 08/11/24  0454 08/10/24  1546 08/10/24  0019 08/09/24  1517 08/09/24  0621 08/09/24  0412    141 139 137 137   < > 140   < >  --  143   POTASSIUM 4.0 4.2 4.0 4.7 4.3   < > 4.6   < >  --  4.3   CHLORIDE 108* 108* 109* 107 109*   < > 111*   < >  --  112*   CO2 18* 19* 19* 19* 18*   < > 18*   < >  --  14*   ANIONGAP 12 14 11 11 10   < > 11   < >  --  17*   GLC 85 111* 89 88 93   < > 131*   < >  --  177*   BUN 44.6* 50.2* 55.5* 54.7* 52.9*   < > 51.9*   < >  --  45.0*   CR 2.25* 2.37* 2.63* 2.58* 2.39*   < > 2.19*   < >  --  1.76*   GFRESTIMATED 21* 19* 17* 17* 19*   < > 21*   < >  --  28*   ALEXANDRO 7.8* 8.2* 7.7* 8.1* 8.0*   < > 7.8*   < >  --  8.1*   MAG  --   --   --   --  2.0  --  2.2  --   --  2.1   PHOS  --   --   --   --   --   --   --   --  3.4  --     < > = values in this interval not  displayed.     Troponins:   Lab Results   Component Value Date    TROPI 0.021 09/15/2013    TROPI 0.034 09/15/2013    TROPI 0.015 09/15/2013    TROPI 0.210 (HH) 07/24/2013    TROPI 0.054 (H) 07/23/2013    TROPONIN 0.00 09/15/2013    TROPONIN 0.02 07/22/2013    TROPONIN 0.03 09/17/2011    TROPONIN 0.00 02/13/2010    TROPONIN 0.00 11/09/2009        INR  Recent Labs   Lab 08/10/24  0521 08/09/24  0019 08/08/24  0651   INR 1.43* 1.31* 1.14     Liver panel  Recent Labs   Lab 08/13/24  0523 08/12/24  1550 08/12/24  0442 08/11/24  1627   PROTTOTAL 5.5* 6.4 5.2* 5.8*   ALBUMIN 3.1* 3.6 2.9* 3.3*   BILITOTAL 0.5 0.5 0.4 0.5   ALKPHOS 110 129 112 124   AST 46* 72* 74* 111*   ALT 55* 70* 70* 94*       Imaging/procedure results:  EKG 12 Lead 8/8/24:        Echocardiogram 8/11/24:  Interpretation Summary  Normal biventricular function.  Small sized circumferential pericaridal effusion is present, measuring 0.9 cm  posteriorly. No chamber invagination.  ______________________________________________________________________________  Left Ventricle  Global and regional left ventricular function is normal with an EF of 55-60%.     Right Ventricle  Global right ventricular function is normal.     Pericardium  Small sized circumferential pericaridal effusion is present, measuring 0.9 cm  posteriorly. No chamber invagination.     Miscellaneous  A left pleural effusion is present.     Compared to Previous Study  This study was compared with the study from 8.10.24 . Pericaridal effusion is  slightly larger.     Coronary Angiogram 11/6/23:    Prox LAD to Mid LAD lesion is 40% stenosed.    Ost LAD to Prox LAD lesion is 50% stenosed.    Prox Cx to Mid Cx lesion is 55% stenosed.    1st Diag lesion is 85% stenosed.    2nd Diag lesion is 70% stenosed.    RPDA-2 lesion is 50% stenosed.    RPDA-1 lesion is 65% stenosed.    Mid LAD lesion is 70% stenosed.     S/p ALVAREZ to mid LAD

## 2024-08-13 NOTE — PLAN OF CARE
Shift: [id number]        Team: [facility]  Neuro: A&Ox4, denies numbness and tingling  Resp: >95% on room air   Cardiac: AFib, afebrile, palpable pulses, vital signs stable   GI/: Voids spontaneously, bowel sounds audible, no bowel movement    Skin: Lymphedema wraps on bilateral legs, generalized bruising   Pain: 6/10 non-cardiac chest pain  PRN: Tramadol and tylenol given once  Activity: Assist 1 with home walker   LDA's: L PIV saline locked, R PIV saline locked, pericardial drain to -20 mmH2O continuous suction   Diet: Regular       Plan: Continue plan of care and notify team with changes.

## 2024-08-13 NOTE — PROGRESS NOTES
Repeat limited echo with trivial pericardial effusion, stable from prior. Pericardial drain removed at bedside. Patient tolerated it well with no complications.

## 2024-08-14 ENCOUNTER — APPOINTMENT (OUTPATIENT)
Dept: OCCUPATIONAL THERAPY | Facility: CLINIC | Age: 89
DRG: 274 | End: 2024-08-14
Attending: INTERNAL MEDICINE
Payer: COMMERCIAL

## 2024-08-14 ENCOUNTER — APPOINTMENT (OUTPATIENT)
Dept: PHYSICAL THERAPY | Facility: CLINIC | Age: 89
DRG: 274 | End: 2024-08-14
Attending: INTERNAL MEDICINE
Payer: COMMERCIAL

## 2024-08-14 LAB
ACANTHOCYTES BLD QL SMEAR: ABNORMAL
ALBUMIN SERPL BCG-MCNC: 3 G/DL (ref 3.5–5.2)
ALP SERPL-CCNC: 111 U/L (ref 40–150)
ALT SERPL W P-5'-P-CCNC: 40 U/L (ref 0–50)
ANION GAP SERPL CALCULATED.3IONS-SCNC: 13 MMOL/L (ref 7–15)
AST SERPL W P-5'-P-CCNC: 44 U/L (ref 0–45)
AUER BODIES BLD QL SMEAR: ABNORMAL
BACTERIA BLD CULT: NO GROWTH
BASO STIPL BLD QL SMEAR: ABNORMAL
BILIRUB SERPL-MCNC: 0.6 MG/DL
BITE CELLS BLD QL SMEAR: ABNORMAL
BLISTER CELLS BLD QL SMEAR: ABNORMAL
BUN SERPL-MCNC: 38 MG/DL (ref 8–23)
BURR CELLS BLD QL SMEAR: SLIGHT
CALCIUM SERPL-MCNC: 8 MG/DL (ref 8.8–10.4)
CHLORIDE SERPL-SCNC: 108 MMOL/L (ref 98–107)
CREAT SERPL-MCNC: 2.25 MG/DL (ref 0.51–0.95)
CRP SERPL-MCNC: 108 MG/L
DACRYOCYTES BLD QL SMEAR: ABNORMAL
EGFRCR SERPLBLD CKD-EPI 2021: 21 ML/MIN/1.73M2
ELLIPTOCYTES BLD QL SMEAR: ABNORMAL
ERYTHROCYTE [DISTWIDTH] IN BLOOD BY AUTOMATED COUNT: 18.3 % (ref 10–15)
FRAGMENTS BLD QL SMEAR: SLIGHT
GLUCOSE SERPL-MCNC: 79 MG/DL (ref 70–99)
HCO3 SERPL-SCNC: 17 MMOL/L (ref 22–29)
HCT VFR BLD AUTO: 27.2 % (ref 35–47)
HGB BLD-MCNC: 8.2 G/DL (ref 11.7–15.7)
HGB C CRYSTALS: ABNORMAL
HOWELL-JOLLY BOD BLD QL SMEAR: ABNORMAL
MAGNESIUM SERPL-MCNC: 1.8 MG/DL (ref 1.7–2.3)
MCH RBC QN AUTO: 29.7 PG (ref 26.5–33)
MCHC RBC AUTO-ENTMCNC: 30.1 G/DL (ref 31.5–36.5)
MCV RBC AUTO: 99 FL (ref 78–100)
NEUTS HYPERSEG BLD QL SMEAR: ABNORMAL
PLAT MORPH BLD: ABNORMAL
PLATELET # BLD AUTO: 106 10E3/UL (ref 150–450)
POLYCHROMASIA BLD QL SMEAR: SLIGHT
POTASSIUM SERPL-SCNC: 3.7 MMOL/L (ref 3.4–5.3)
PROT SERPL-MCNC: 5.7 G/DL (ref 6.4–8.3)
RBC # BLD AUTO: 2.76 10E6/UL (ref 3.8–5.2)
RBC AGGLUT BLD QL: ABNORMAL
RBC MORPH BLD: ABNORMAL
ROULEAUX BLD QL SMEAR: ABNORMAL
SICKLE CELLS BLD QL SMEAR: ABNORMAL
SMUDGE CELLS BLD QL SMEAR: ABNORMAL
SODIUM SERPL-SCNC: 138 MMOL/L (ref 135–145)
SPHEROCYTES BLD QL SMEAR: ABNORMAL
STOMATOCYTES BLD QL SMEAR: ABNORMAL
TARGETS BLD QL SMEAR: ABNORMAL
TOXIC GRANULES BLD QL SMEAR: ABNORMAL
VARIANT LYMPHS BLD QL SMEAR: PRESENT
WBC # BLD AUTO: 11.6 10E3/UL (ref 4–11)

## 2024-08-14 PROCEDURE — 97116 GAIT TRAINING THERAPY: CPT | Mod: GP | Performed by: PHYSICAL THERAPIST

## 2024-08-14 PROCEDURE — 97530 THERAPEUTIC ACTIVITIES: CPT | Mod: GO

## 2024-08-14 PROCEDURE — 120N000005 HC R&B MS OVERFLOW UMMC

## 2024-08-14 PROCEDURE — 250N000013 HC RX MED GY IP 250 OP 250 PS 637: Performed by: NURSE PRACTITIONER

## 2024-08-14 PROCEDURE — 250N000013 HC RX MED GY IP 250 OP 250 PS 637: Performed by: INTERNAL MEDICINE

## 2024-08-14 PROCEDURE — 97530 THERAPEUTIC ACTIVITIES: CPT | Mod: GP | Performed by: PHYSICAL THERAPIST

## 2024-08-14 PROCEDURE — 86140 C-REACTIVE PROTEIN: CPT | Performed by: NURSE PRACTITIONER

## 2024-08-14 PROCEDURE — 83735 ASSAY OF MAGNESIUM: CPT | Performed by: INTERNAL MEDICINE

## 2024-08-14 PROCEDURE — 85027 COMPLETE CBC AUTOMATED: CPT | Performed by: NURSE PRACTITIONER

## 2024-08-14 PROCEDURE — 250N000013 HC RX MED GY IP 250 OP 250 PS 637

## 2024-08-14 PROCEDURE — 250N000013 HC RX MED GY IP 250 OP 250 PS 637: Performed by: STUDENT IN AN ORGANIZED HEALTH CARE EDUCATION/TRAINING PROGRAM

## 2024-08-14 PROCEDURE — 82040 ASSAY OF SERUM ALBUMIN: CPT | Performed by: NURSE PRACTITIONER

## 2024-08-14 PROCEDURE — 36415 COLL VENOUS BLD VENIPUNCTURE: CPT | Performed by: NURSE PRACTITIONER

## 2024-08-14 PROCEDURE — 97535 SELF CARE MNGMENT TRAINING: CPT | Mod: GO

## 2024-08-14 PROCEDURE — 99232 SBSQ HOSP IP/OBS MODERATE 35: CPT | Performed by: NURSE PRACTITIONER

## 2024-08-14 RX ORDER — POTASSIUM CHLORIDE 750 MG/1
10 TABLET, EXTENDED RELEASE ORAL ONCE
Status: COMPLETED | OUTPATIENT
Start: 2024-08-14 | End: 2024-08-14

## 2024-08-14 RX ORDER — ROSUVASTATIN CALCIUM 10 MG/1
20 TABLET, COATED ORAL DAILY
Status: DISCONTINUED | OUTPATIENT
Start: 2024-08-14 | End: 2024-08-15 | Stop reason: HOSPADM

## 2024-08-14 RX ADMIN — METOPROLOL TARTRATE 50 MG: 25 TABLET, FILM COATED ORAL at 09:22

## 2024-08-14 RX ADMIN — CLOPIDOGREL BISULFATE 75 MG: 75 TABLET ORAL at 09:22

## 2024-08-14 RX ADMIN — METOPROLOL TARTRATE 50 MG: 25 TABLET, FILM COATED ORAL at 20:00

## 2024-08-14 RX ADMIN — ISOSORBIDE MONONITRATE 90 MG: 30 TABLET, EXTENDED RELEASE ORAL at 09:22

## 2024-08-14 RX ADMIN — CYANOCOBALAMIN TAB 500 MCG 500 MCG: 500 TAB at 09:22

## 2024-08-14 RX ADMIN — FUROSEMIDE 40 MG: 40 TABLET ORAL at 09:22

## 2024-08-14 RX ADMIN — PANTOPRAZOLE SODIUM 40 MG: 40 TABLET, DELAYED RELEASE ORAL at 20:00

## 2024-08-14 RX ADMIN — SENNOSIDES AND DOCUSATE SODIUM 1 TABLET: 8.6; 5 TABLET ORAL at 09:23

## 2024-08-14 RX ADMIN — ALLOPURINOL 100 MG: 100 TABLET ORAL at 09:22

## 2024-08-14 RX ADMIN — PANTOPRAZOLE SODIUM 40 MG: 40 TABLET, DELAYED RELEASE ORAL at 09:22

## 2024-08-14 RX ADMIN — TIMOLOL MALEATE 1 DROP: 5 SOLUTION/ DROPS OPHTHALMIC at 09:21

## 2024-08-14 RX ADMIN — LIDOCAINE 1 PATCH: 4 PATCH TOPICAL at 03:57

## 2024-08-14 RX ADMIN — ROSUVASTATIN CALCIUM 20 MG: 10 TABLET, FILM COATED ORAL at 12:13

## 2024-08-14 RX ADMIN — POTASSIUM CHLORIDE 10 MEQ: 750 TABLET, EXTENDED RELEASE ORAL at 09:22

## 2024-08-14 ASSESSMENT — ACTIVITIES OF DAILY LIVING (ADL)
ADLS_ACUITY_SCORE: 36
ADLS_ACUITY_SCORE: 34
ADLS_ACUITY_SCORE: 36
ADLS_ACUITY_SCORE: 34
ADLS_ACUITY_SCORE: 34
ADLS_ACUITY_SCORE: 36
ADLS_ACUITY_SCORE: 34
ADLS_ACUITY_SCORE: 34
ADLS_ACUITY_SCORE: 36
ADLS_ACUITY_SCORE: 36
ADLS_ACUITY_SCORE: 34
ADLS_ACUITY_SCORE: 36
ADLS_ACUITY_SCORE: 36
ADLS_ACUITY_SCORE: 34
ADLS_ACUITY_SCORE: 34

## 2024-08-14 NOTE — DISCHARGE SUMMARY
I saw and evaluated the patient as part of a shared APRN/PA visit. I have seen and examined the patient with the CSI team and reviewed the pertinent laboratory results. I agree with the assessment and plan of the discharge summary note above. I spent 35 minutes face-to-face and/or coordinating care. Over 50% of the time on the unit was spent counseling the patient and/or coordinating care as documented above.     Constantine Macias MD  Interventional Cardiology  Pager: [phone]    [address] [address]  p: [phone]    Discharge Summary: Cardiology Service    Tessa Mansfield MRN# [id number]   YOB: 1935 Age: 89 year old       Admission Date: 08/08/2024  Discharge Date: 08/15/2024    Discharge Diagnoses:  # Paroxysmal atrial fibrillation  # intolerance to AC due to GIB s/p Watchman (8/4/24)  # pericardial effusion s/p pericardiocentesis 8/8, 8/11  # CAD s/p multiple PCIs  # HLD  # HTN  # Chronic diastolic heart failure  # SSS s/p dual chamber PPM  # Concern for infection  # Leukocytosis  # SHAJI on CKD IV  # Elevated LFT's  # Acute blood loss anemia, stable  # Anemia of chronic disease  # 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 Admitted to the CICU due to development of a pericardial effusion following WATCHMAN placement. Because subsequent TTE suggested worsening of effusion, patient underwent pericardiocentesis on 8/8 w/ 220 mL and drain placement. Repeat pericardioscentesis 8/11 with additional 100 mL fluid removed, drain removed 8/13. PT consulted with recs for home with assist. She will have close  structural heart follow up with repeat imaging per structural heart team decision.     Hospital Course by Diagnosis:  # Paroxysmal atrial fibrillation  # intolerance to AC due to GIB s/p Watchman (8/4/24)  # pericardial effusion s/p pericardiocentesis 8/8  Patient with a CHADsVASC 5 and HASBLED of 5. She has had difficulty tolerating eliquis in the past due to recurrent GIB in the setting of AVMs.  S/p 31mm WATCHMAN FLX device placement on August 8, 2024  Watchman procedure complicated by new pericardial effusion without tamponade. S/p pericardiocentesis 8/8 with 220 ml off. There was no contrast extravasation noted. Patient admitted for post procedural monitoring with pericardial drain in place.   Trial of drain clamp overnight 8/9 and 8/10 unsuccessful. No new drain output documented and patient with slightly larger effusion on 8/11 echo.  - Return to cath lab 8/11 for drain manipulation and 100 ml yellow drainage.                - Clamp 8/13 AM without reaccumulation, drain removed 8/13 PM  - Plavix 75 mg daily, ASA stopped by Dr. Garcia recs 8/11  - Resume PTA metoprolol tartrate 50 mg BID   - SBE prophylaxis x 6 months post LAAO   - Follow-up structural NAPOLEON 1 week and 45 days post procedure  - 45 days following Watchman device implantation, patient will return for MICHELL to evaluate endothelialization of the device. If adequate seal is assessed (<5mm), antiplatelet therapy with clopidogrel will be continued for total of 6 months from implantation date, with aspirin then continuing lifelong.    - Discussed chest pain with patient. States it is worse with inspiration but feels similar to chronic pain she has had for years. Discussed treating potential pericarditis with Dr. Echevarria 8/12, limited options due to kidney function. Since pain feels similar to prior, will defer further pericarditis treatment. Pain resolved with drain removal      # CAD s/p multiple PCIs  # HLD  Extensive CAD history with multiple PCIs  in the past. Most recently patient had a PCI to the mLAD on 11/26/23.  - Plavix as above  - Increase PTA Imdur to 90 daily  - Continue PTA rosuvastatin 20 mg daily      # HTN  # Chronic heart failure with preserved ejection fraction  Dilated IVC on 8/10 echo.   - Volume status: euvolemic start PO Lasix 40 mg daily  - BP stable while IP, holding PTA Amlodipine at time of discharge, if hypertensive at follow up may need to be resumed  - Daily weights  - I/O     #SSS s/p dual chamber PPM  Last device check 5/28/24 with 6.6% AF burden. AP 27.5%,  0.1%  - OP EP follow up     # Concern for infection  # Leukocytosis  Started on abx on 8/8 due to elevated LA and WBC. Currently without localizing symptoms.  - LA normalized on 8/10  - At discharge, WBC 8.4     # SHAJI on CKD IV, improving  Baseline Cr 1.7-2.0. Cr 1.71 on admission.  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 Presented with Hgb 8.8, given 1 U pRBC during the case. Currently without overt bleeding or high pericardial drain output.  - Off AC due to GIB in addition to concern for pericardial effusion  - Without overt blood losses during admission  - Hgb at discharge 8.3     # L groin hematoma:  Following pull of art sheath 8/8. Improved with local pressure and fem stop  - US without evidence of active bleeding and pseudoaneurysm  - CTM     # Discharge Planning  Current PT/OT recs TCU or home with assist. Patient states she prefers rehab because she feels very deconditioned. While waiting placement, patient progressed to home with assist  - Appreciate SW/RNCC assist in dispo planning  - Patient discharged to home with OP PT/OT referrals    Pertinent Procedures:  1. Watchman 8/8  2. Pericardiocentesis 8/8,  8/11    Consults:  Gastroenterology (hepatology)  RNCC/SW    Medication Changes:  See below     Discharge medications:   Current Discharge Medication List        START taking these medications    Details   isosorbide mononitrate (IMDUR) 30 MG 24 hr tablet Take 3 tablets (90 mg) by mouth daily  Qty: 90 tablet, Refills: 11    Associated Diagnoses: Coronary artery disease of native artery of native heart with stable angina pectoris (H24)           CONTINUE these medications which have NOT CHANGED    Details   acetaminophen (TYLENOL) 325 MG tablet Take 975 mg by mouth nightly as needed for pain      allopurinol (ZYLOPRIM) 100 MG tablet Take 1 tablet (100 mg) by mouth daily  Qty: 90 tablet, Refills: 0    Associated Diagnoses: Idiopathic gout, unspecified chronicity, unspecified site      clopidogrel (PLAVIX) 75 MG tablet Take 1 tablet (75 mg) by mouth daily  Qty: 90 tablet, Refills: 1    Associated Diagnoses: S/P coronary artery stent placement      fluticasone-salmeterol (ADVAIR) 250-50 MCG/ACT inhaler INHALE 1 DOSE BY MOUTH TWICE DAILY  Qty: 60 each, Refills: 8    Associated Diagnoses: Chronic cough      furosemide (LASIX) 40 MG tablet Take 1 tablet (40 mg) by mouth daily May take an additional 20 mg at noon as needed for swelling.  Qty: 90 tablet, Refills: 2    Comments: Please take an additional 20 mgs for 3 days at noon. After that, may take additional 20 mg at noon as needed for swelling  Associated Diagnoses: Chronic systolic heart failure (H)      methocarbamol (ROBAXIN) 500 MG tablet Take 1 tablet (500 mg) by mouth every 6 hours as needed for muscle spasms  Qty: 30 tablet, Refills: 0    Associated Diagnoses: Acute post-operative pain      metoprolol tartrate (LOPRESSOR) 25 MG tablet Take 2 tablets (50 mg) by mouth 2 times daily  Qty: 180 tablet, Refills: 3    Associated Diagnoses: Coronary artery disease of native artery of native heart with stable angina pectoris (H24)      Multiple Vitamins-Minerals  (PRESERVISION AREDS 2+MULTI VIT PO) Take 1 capsule by mouth 2 times daily      omeprazole (PRILOSEC) 40 MG DR capsule Take 1 capsule (40 mg) by mouth daily  Qty: 90 capsule, Refills: 3    Associated Diagnoses: Gastroesophageal reflux disease without esophagitis      potassium chloride ER (K-TAB) 20 MEQ CR tablet Take 1 tablet (20 mEq) by mouth daily  Qty: 90 tablet, Refills: 3    Associated Diagnoses: Hypokalemia      rosuvastatin (CRESTOR) 20 MG tablet Take 20 mg by mouth daily      sodium bicarbonate 650 MG tablet Take 1 tablet (650 mg) by mouth 2 times daily  Qty: 60 tablet, Refills: 11    Associated Diagnoses: Metabolic acidosis      timolol maleate (TIMOPTIC) 0.5 % ophthalmic solution INSTILL 1 DROP INTO EACH EYE ONCE DAILY IN THE MORNING      vitamin B-12 (CYANOCOBALAMIN) 500 MCG tablet Take 1 tablet by mouth daily      vitamin D3 25 mcg (1000 units) tablet Take 1 tablet (25 mcg) by mouth every other day  Qty: 90 tablet, Refills: 3    Associated Diagnoses: Secondary renal hyperparathyroidism (H24); CKD (chronic kidney disease) stage 4, GFR 15-29 ml/min (H)           STOP taking these medications       amLODIPine (NORVASC) 10 MG tablet Comments:   Reason for Stopping:         aspirin (ASA) 325 MG EC tablet Comments:   Reason for Stopping:               Follow-up:  Structural heart 1 week, 45 days    Labs or imaging requiring follow-up after discharge:  BMP, CBC    Code status:  FULL    Condition on discharge  Temp:  [97.4  F (36.3  C)-99.2  F (37.3  C)] 99.1  F (37.3  C)  Pulse:  [65-74] 65  Resp:  [16-20] 20  BP: (118-146)/(54-68) 146/68  SpO2:  [94 %-98 %] 95 %  General: Alert, interactive, no acute distress  HEENT: Normocephalic, atraumatic. Sclera anicteric.   Neck: JVP not elevated  Cardiovascular: Regular rate and rhythm, normal S1 and S2, no murmurs, gallops, or rubs. Radial and pedal pulses palpable bilaterally.   Resp: Regular work of breathing on room air. Clear to auscultation bilaterally, no rales,  wheezes, or rhonchi  GI: Soft, nontender, nondistended.   Extremities: no edema, no cyanosis or clubbing, warm and well perfused  Skin: Warm and dry, no jaundice or rash  Neuro: Alert and oriented x 3. CN 2-12 intact, moves all extremities equally, normal speech  Psych: Mood and affect are appropriate    Imaging with results:  EKG 12 Lead 8/8/24:        Echocardiogram 8/11/24:  Interpretation Summary  Normal biventricular function.  Small sized circumferential pericaridal effusion is present, measuring 0.9 cm  posteriorly. No chamber invagination.  ______________________________________________________________________________  Left Ventricle  Global and regional left ventricular function is normal with an EF of 55-60%.     Right Ventricle  Global right ventricular function is normal.     Pericardium  Small sized circumferential pericaridal effusion is present, measuring 0.9 cm  posteriorly. No chamber invagination.     Miscellaneous  A left pleural effusion is present.     Compared to Previous Study  This study was compared with the study from 8.10.24 . Pericaridal effusion is  slightly larger.     Coronary Angiogram 11/6/23:    Prox LAD to Mid LAD lesion is 40% stenosed.    Ost LAD to Prox LAD lesion is 50% stenosed.    Prox Cx to Mid Cx lesion is 55% stenosed.    1st Diag lesion is 85% stenosed.    2nd Diag lesion is 70% stenosed.    RPDA-2 lesion is 50% stenosed.    RPDA-1 lesion is 65% stenosed.    Mid LAD lesion is 70% stenosed.     S/p ALVAREZ to mid LAD              Recent Labs   Lab 08/15/24  0522 08/14/24  0549 08/13/24  0523 08/12/24  1550 08/11/24  1627 08/11/24  0454 08/10/24  1546 08/10/24  0019 08/09/24  1517 08/09/24  0621 08/09/24  0412    138 138 141   < > 137   < > 140   < >  --  143   POTASSIUM 3.3* 3.7 4.0 4.2   < > 4.3   < > 4.6   < >  --  4.3   CHLORIDE 110* 108* 108* 108*   < > 109*   < > 111*   < >  --  112*   CO2 20* 17* 18* 19*   < > 18*   < > 18*   < >  --  14*   ANIONGAP 12 13 12 14   <  > 10   < > 11   < >  --  17*   GLC 81 79 85 111*   < > 93   < > 131*   < >  --  177*   BUN 35.0* 38.0* 44.6* 50.2*   < > 52.9*   < > 51.9*   < >  --  45.0*   CR 2.18* 2.25* 2.25* 2.37*   < > 2.39*   < > 2.19*   < >  --  1.76*   GFRESTIMATED 21* 21* 21* 19*   < > 19*   < > 21*   < >  --  28*   ALEXANDRO 8.0* 8.0* 7.8* 8.2*   < > 8.0*   < > 7.8*   < >  --  8.1*   MAG  --  1.8  --   --   --  2.0  --  2.2  --   --  2.1   PHOS  --   --   --   --   --   --   --   --   --  3.4  --     < > = values in this interval not displayed.         Patient Care Team:  Pedro Gomez MD as PCP - General (Family Practice)  Adriana Lilly MD as MD (Urology)  Lilly Ritchie RN as Nurse Coordinator  Tyrell Santoyo MD as MD (Cardiology)  Henny Larry MD as MD (Otolaryngology)  Jorge Singh MD as Assigned Pulmonology Provider  Pedro Gomez MD as Assigned PCP  Gladys Ratliff, RN as Specialty Care Coordinator (Nephrology)  Randa Ann NP as Assigned Heart and Vascular Provider  Felix Marquez MD as Assigned Neuroscience Provider  Chad Blair MD as MD (Critical Care)  Randa Ann NP as Nurse Practitioner (Cardiovascular Disease)  Barbara Dimas, RN as Specialty Care Coordinator (Pharmacy)  [facility] [employer]  [facility] as Assigned Nephrology Provider    Time Spent on this Encounter   I, JI Nino CNP, personally saw the patient today and spent greater than 30 minutes discharging this patient.    >30 minutes spent in discharge, including >50% in counseling and coordination of care, medication review and plan of care recommended on follow up. Questions were answered.   It was our pleasure to care for Tessa Mansfield during this hospitalization. Please do not hesitate to contact me should there be questions regarding the hospital course or discharge plan.    Patient discussed with staff cardiologist, Dr. Macias, who  agrees with the above documentation and plan. Documentation represents joint decision making.     JI Marte, CNP  [facility] Cardiology

## 2024-08-14 NOTE — PLAN OF CARE
Edema Discharge Summary    Reason for therapy discharge:    All goals and outcomes met, no further needs identified.    Progress towards therapy goal(s). See goals on Care Plan in [facility] electronic health record for goal details.  Goals met    Therapy recommendation(s):    No further therapy is recommended.  Continue wearing coverflex size F compression stockings during the day and with activity, may doff overnight if experiencing discomfort.

## 2024-08-14 NOTE — PROGRESS NOTES
Interventional Cardiology Progress Note      Assessment & Plan:  Tessa Mansfield is a 89 year old female who was admitted following a planned Watchman device placement. 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 Admitted to the CICU due to development of a pericardial effusion following WATCHMAN placement. Because subsequent TTE suggested worsening of effusion, patient underwent pericardiocentesis on 8/8 w/ 220 mL and drain placement.     Today's Update:  - Drain removed 8/13 PM, medically ready for TCU  - Appreciate SW/RNCC assistance  - If transient orthostasis present again today, will decrease BB dosing, encouraged proper hydration/nutrition    # Paroxysmal atrial fibrillation  # intolerance to AC due to GIB s/p Watchman (8/4/24)  # pericardial effusion s/p pericardiocentesis 8/8  Patient with a CHADsVASC 5 and HASBLED of 5. She has had difficulty tolerating eliquis in the past due to recurrent GIB in the setting of AVMs.  S/p 31mm WATCHMAN FLX device placement on August 8, 2024  Watchman procedure complicated by new pericardial effusion without tamponade. S/p pericardiocentesis 8/8 with 220 ml off. There was no contrast extravasation noted. Patient admitted for post procedural monitoring with pericardial drain in place.   Trial of drain clamp overnight 8/9 and 8/10 unsuccessful. No new drain output documented and patient with slightly larger effusion on 8/11 echo.  - Return to cath lab 8/11 for drain manipulation and 100 ml yellow drainage.               - 27 mL output since 8/11,               - Clamp 8/13 AM without reaccumulation, drain removed 8/13 PM  - Plavix 75 mg daily, ASA stopped by Dr. Garcia recs 8/11  - Resume PTA metoprolol tartrate 50 mg BID   - SBE prophylaxis x 6 months post LAAO   - Follow-up structural NAPOLEON 1 week and 45 days post procedure  - 45  days following Watchman device implantation, patient will return for MICHELL to evaluate endothelialization of the device. If adequate seal is assessed (<5mm), antiplatelet therapy with clopidogrel will be continued for total of 6 months from implantation date, with aspirin then continuing lifelong.    - Telemetry  - Discussed chest pain with patient. States it is worse with inspiration but feels similar to chronic pain she has had for years. Discussed treating potential pericarditis with Dr. Echevarria 8/12, limited options due to kidney function. Since pain feels similar to prior, will defer further pericarditis treatment     # CAD s/p multiple PCIs  # HLD  Extensive CAD history with multiple PCIs in the past. Most recently patient had a PCI to the mLAD on 11/26/23.  - Plavix as above  - Increase PTA Imdur to 90 daily  - Continue PTA rosuvastatin 20 mg daily when LFTs normalize     # HTN  # Chronic heart failure with preserved ejection fraction  Dilated IVC on 8/10 echo.   - Volume status: euvolemic start PO Lasix 40 mg daily  - PTA amlodipine 10 mg daily - slowly reintroduce as tolerated  - Daily weights  - I/O     #SSS s/p dual chamber PPM  Last device check 5/28/24 with 6.6% AF burden. AP 27.5%,  0.1%  - OP EP follow up     # Concern for infection  # Leukocytosis  Started on abx on 8/8 due to elevated LA and WBC. Currently without localizing symptoms.  - LA normalized on 8/10  - Monitor off abx  - Daily CBC     # SHAJI on CKD IV, improving  Baseline Cr 1.7-2.0. Cr 1.71 on admission.  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 Presented with Hgb 8.8, given 1 U pRBC during the case. Currently without overt bleeding or high pericardial drain output.  - Off AC  "due to GIB in addition to concern for pericardial effusion  - Presently without overt blood losses  - CBC daily     # L groin hematoma:  Following pull of art sheath 8/8. Improved with local pressure and fem stop  - US without evidence of active bleeding and pseudoaneurysm  - CTM     # Discharge Planning  Current PT/OT recs TCU or home with assist. Patient states she prefers rehab because she feels very deconditioned.   - Appreciate SW/RNCC assist in dispo planning     Diet: regular  Activity: as tolerated  Code Status: full  Disposition: current recs: TCU, medically ready    Patient discussed w/ Dr. Macias.    Medically Ready for Discharge: Ready Now    Philly Chaparro, APRN, CNP  [facility]  Interventional Cardiology  [phone]    Medical Decision Making       30 MINUTES SPENT BY ME on the date of service doing chart review, history, exam, documentation & further activities per the note.      Interval History:  NAEO, telemetry, labs, vital signs and nursing notes reviewed.  Drain removed overnight, HDS  On exam, patient feels well, states chest pain has completely resolved  Most recent vital signs:  /53 (BP Location: Left arm)   Pulse 72   Temp 99.3  F (37.4  C) (Oral)   Resp 18   Ht 1.676 m (5' 6\")   Wt 67.1 kg (147 lb 14.4 oz)   SpO2 94%   BMI 23.87 kg/m    Temp:  [98  F (36.7  C)-99.5  F (37.5  C)] 99.3  F (37.4  C)  Pulse:  [71-85] 72  Resp:  [18] 18  BP: ()/(53-73) 126/53  SpO2:  [92 %-98 %] 94 %  Wt Readings from Last 2 Encounters:   08/14/24 67.1 kg (147 lb 14.4 oz)   08/05/24 67.9 kg (149 lb 11.2 oz)       Intake/Output Summary (Last 24 hours) at 8/14/2024 0717  Last data filed at 8/14/2024 0400  Gross per 24 hour   Intake 1300 ml   Output 800 ml   Net 500 ml       Physical exam:  General: Pleasant elderly female. Appears comfortable and in no acute distress. Alert and interactive  HEENT: Normocephalic, atraumatic. No scleral icterus or " injection  Neck: JVP not elevated  CARDIAC: Regular rate and rhythm, no m/r/g appreciated. Peripheral pulses 2+  RESP: Normal work of breathing on room air without use of accessory breathing muscles. Clear to auscultation in all fields. No wheezes, rhonchi or crackles appreciated.  GI: No abdominal distention. Soft and nontender.   EXTREMITIES: Without lower extremity edema. No cyanosis or clubbing. Warm and well perfused. No venous stasis changes.   SKIN: No acute lesions appreciated. Warm and dry to touch  NEURO: Alert and oriented X3, CN II-XII grossly intact, no focal neurological deficits noted, normal speech  PSYCH: Mood and affect are appropriate    Labs (Past three days):  CBC  Recent Labs   Lab 08/13/24  0831 08/11/24  0454 08/10/24  0019 08/09/24  0412   WBC 10.5 15.4* 17.1* 17.4*   RBC 2.83* 2.75* 3.05* 3.08*   HGB 8.4* 8.1* 9.0* 9.0*   HCT 27.8* 27.0* 29.5* 29.2*   MCV 98 98 97 95   MCH 29.7 29.5 29.5 29.2   MCHC 30.2* 30.0* 30.5* 30.8*   RDW 18.6* 18.7* 18.6* 17.6*   PLT 81* 80* 92* 92*     BMP  Recent Labs   Lab 08/14/24  0549 08/13/24  0523 08/12/24  1550 08/12/24  0442 08/11/24  1627 08/11/24  0454 08/10/24  1546 08/10/24  0019 08/09/24  1517 08/09/24  0621 08/09/24  0412    138 141 139   < > 137   < > 140   < >  --  143   POTASSIUM 3.7 4.0 4.2 4.0   < > 4.3   < > 4.6   < >  --  4.3   CHLORIDE 108* 108* 108* 109*   < > 109*   < > 111*   < >  --  112*   CO2 17* 18* 19* 19*   < > 18*   < > 18*   < >  --  14*   ANIONGAP 13 12 14 11   < > 10   < > 11   < >  --  17*   GLC 79 85 111* 89   < > 93   < > 131*   < >  --  177*   BUN 38.0* 44.6* 50.2* 55.5*   < > 52.9*   < > 51.9*   < >  --  45.0*   CR 2.25* 2.25* 2.37* 2.63*   < > 2.39*   < > 2.19*   < >  --  1.76*   GFRESTIMATED 21* 21* 19* 17*   < > 19*   < > 21*   < >  --  28*   ALEXANDRO 8.0* 7.8* 8.2* 7.7*   < > 8.0*   < > 7.8*   < >  --  8.1*   MAG  --   --   --   --   --  2.0  --  2.2  --   --  2.1   PHOS  --   --   --   --   --   --   --   --   --  3.4   --     < > = values in this interval not displayed.     Troponins:   Lab Results   Component Value Date    TROPI 0.021 09/15/2013    TROPI 0.034 09/15/2013    TROPI 0.015 09/15/2013    TROPI 0.210 (HH) 07/24/2013    TROPI 0.054 (H) 07/23/2013    TROPONIN 0.00 09/15/2013    TROPONIN 0.02 07/22/2013    TROPONIN 0.03 09/17/2011    TROPONIN 0.00 02/13/2010    TROPONIN 0.00 11/09/2009        INR  Recent Labs   Lab 08/10/24  0521 08/09/24  0019 08/08/24  0651   INR 1.43* 1.31* 1.14     Liver panel  Recent Labs   Lab 08/14/24  0549 08/13/24  0523 08/12/24  1550 08/12/24  0442   PROTTOTAL 5.7* 5.5* 6.4 5.2*   ALBUMIN 3.0* 3.1* 3.6 2.9*   BILITOTAL 0.6 0.5 0.5 0.4   ALKPHOS 111 110 129 112   AST 44 46* 72* 74*   ALT 40 55* 70* 70*       Imaging/procedure results:  EKG 12 Lead 8/8/24:        Echocardiogram 8/11/24:  Interpretation Summary  Normal biventricular function.  Small sized circumferential pericaridal effusion is present, measuring 0.9 cm  posteriorly. No chamber invagination.  ______________________________________________________________________________  Left Ventricle  Global and regional left ventricular function is normal with an EF of 55-60%.     Right Ventricle  Global right ventricular function is normal.     Pericardium  Small sized circumferential pericaridal effusion is present, measuring 0.9 cm  posteriorly. No chamber invagination.     Miscellaneous  A left pleural effusion is present.     Compared to Previous Study  This study was compared with the study from 8.10.24 . Pericaridal effusion is  slightly larger.     Coronary Angiogram 11/6/23:    Prox LAD to Mid LAD lesion is 40% stenosed.    Ost LAD to Prox LAD lesion is 50% stenosed.    Prox Cx to Mid Cx lesion is 55% stenosed.    1st Diag lesion is 85% stenosed.    2nd Diag lesion is 70% stenosed.    RPDA-2 lesion is 50% stenosed.    RPDA-1 lesion is 65% stenosed.    Mid LAD lesion is 70% stenosed.     S/p ALVAREZ to mid LAD

## 2024-08-14 NOTE — PLAN OF CARE
Temp: 99.3  F (37.4  C) Temp src: Oral BP: 126/53 Pulse: 72   Resp: 18 SpO2: 94 % O2 Device: None (Room air)       Neuro: A&O x4. Able to make needs known - calls appropriately. No dizziness with ambulation   Cardiac: Tele in place, SR in the 70's.   Resp: VSS on RA. SOB when ambulating back and forth to the bathroom.  GI/: Urinating well getting up to the bathroom. 2 BM's for writer this shift. 1 incontinent stool in brief.    Skin: No new deficits noted.  LDAs: R & L PIV in place SL.   Activity: Up w/ SBA & home walker.      Plan: Continue to monitor and follow POC. Plan to discharge to home tomorrow - with outpatient PT. Notify CSI with changes.

## 2024-08-14 NOTE — PROGRESS NOTES
Care Management Follow Up    Length of Stay (days): 6    Expected Discharge Date: 08/14/2024     Concerns to be Addressed:       Patient plan of care discussed at interdisciplinary rounds: Yes    Anticipated Discharge Disposition:  (TBD)     Anticipated Discharge Services:  (TBD)  Anticipated Discharge DME:  (TBD)    Patient/family educated on Medicare website which has current facility and service quality ratings:    Education Provided on the Discharge Plan:    Patient/Family in Agreement with the Plan:      Referrals Placed by CM/SW:      [facility]  [address] [facility] [address]  P: [phone] ext1 adm or 6 DON or 7 or 8 adm or Admissions: [phone]   F: [phone]  8/12: Sent referral via epic to call [facility]. Spoke to Erik- beds are available.  8/13: spoke to Erik- he said she is at the top of his list and he will call [facility] Manuel. CHW let him know she will be ready late this afternoon.  8/14: left VM that pt is medically ready to call SW. Also left DON VM. Called main and admissions # later, was unable to get ahold of someone.Spoke to ext7 nurse- she transferred to admissions.    Private pay costs discussed: Not applicable    Additional Information:      Josephine Dsouza   6A/B/C Community Health Worker   Phone: [phone]

## 2024-08-14 NOTE — PLAN OF CARE
Neuro: A&O x 4. Call light appropriate.   Cardiac: SR/A-paced. Converted from a fib to SR yesterday at 1719. VSS.   Resp: Room air. Some HIGGINS.  GI/: Regular diet, poor appetite. BM x2 overnight. Received prn senna x1. Up to bathroom.  Skin: See PCS for assessment details.  Lines/Drains: L & R PIV, saline locked.   Activity: Up assist of 1 with Rolator. Tolerating well.    Pain: Denied pain overnight.  Sleep: Pt appeared asleep between cares.     P: Continue to monitor pt status and report changes to treatment team.

## 2024-08-15 ENCOUNTER — TELEPHONE (OUTPATIENT)
Dept: CARDIOLOGY | Facility: CLINIC | Age: 89
End: 2024-08-15
Payer: COMMERCIAL

## 2024-08-15 ENCOUNTER — APPOINTMENT (OUTPATIENT)
Dept: PHYSICAL THERAPY | Facility: CLINIC | Age: 89
DRG: 274 | End: 2024-08-15
Attending: INTERNAL MEDICINE
Payer: COMMERCIAL

## 2024-08-15 VITALS
BODY MASS INDEX: 23.58 KG/M2 | RESPIRATION RATE: 20 BRPM | HEIGHT: 66 IN | HEART RATE: 63 BPM | TEMPERATURE: 97.7 F | DIASTOLIC BLOOD PRESSURE: 58 MMHG | OXYGEN SATURATION: 95 % | SYSTOLIC BLOOD PRESSURE: 123 MMHG | WEIGHT: 146.7 LBS

## 2024-08-15 DIAGNOSIS — I48.20 CHRONIC ATRIAL FIBRILLATION (H): Primary | ICD-10-CM

## 2024-08-15 LAB
ALBUMIN SERPL BCG-MCNC: 3 G/DL (ref 3.5–5.2)
ALP SERPL-CCNC: 111 U/L (ref 40–150)
ALT SERPL W P-5'-P-CCNC: 50 U/L (ref 0–50)
ANION GAP SERPL CALCULATED.3IONS-SCNC: 12 MMOL/L (ref 7–15)
AST SERPL W P-5'-P-CCNC: 63 U/L (ref 0–45)
BILIRUB SERPL-MCNC: 0.5 MG/DL
BUN SERPL-MCNC: 35 MG/DL (ref 8–23)
CALCIUM SERPL-MCNC: 8 MG/DL (ref 8.8–10.4)
CHLORIDE SERPL-SCNC: 110 MMOL/L (ref 98–107)
CREAT SERPL-MCNC: 2.18 MG/DL (ref 0.51–0.95)
CRP SERPL-MCNC: 117 MG/L
EGFRCR SERPLBLD CKD-EPI 2021: 21 ML/MIN/1.73M2
ERYTHROCYTE [DISTWIDTH] IN BLOOD BY AUTOMATED COUNT: 17.7 % (ref 10–15)
GLUCOSE SERPL-MCNC: 81 MG/DL (ref 70–99)
HCO3 SERPL-SCNC: 20 MMOL/L (ref 22–29)
HCT VFR BLD AUTO: 27.4 % (ref 35–47)
HGB BLD-MCNC: 8.3 G/DL (ref 11.7–15.7)
MAGNESIUM SERPL-MCNC: 1.7 MG/DL (ref 1.7–2.3)
MCH RBC QN AUTO: 29.5 PG (ref 26.5–33)
MCHC RBC AUTO-ENTMCNC: 30.3 G/DL (ref 31.5–36.5)
MCV RBC AUTO: 98 FL (ref 78–100)
PLATELET # BLD AUTO: 122 10E3/UL (ref 150–450)
POTASSIUM SERPL-SCNC: 3.3 MMOL/L (ref 3.4–5.3)
PROT SERPL-MCNC: 5.6 G/DL (ref 6.4–8.3)
RBC # BLD AUTO: 2.81 10E6/UL (ref 3.8–5.2)
SODIUM SERPL-SCNC: 142 MMOL/L (ref 135–145)
WBC # BLD AUTO: 8.4 10E3/UL (ref 4–11)

## 2024-08-15 PROCEDURE — 250N000013 HC RX MED GY IP 250 OP 250 PS 637: Performed by: STUDENT IN AN ORGANIZED HEALTH CARE EDUCATION/TRAINING PROGRAM

## 2024-08-15 PROCEDURE — 36415 COLL VENOUS BLD VENIPUNCTURE: CPT | Performed by: NURSE PRACTITIONER

## 2024-08-15 PROCEDURE — 85027 COMPLETE CBC AUTOMATED: CPT | Performed by: NURSE PRACTITIONER

## 2024-08-15 PROCEDURE — 97116 GAIT TRAINING THERAPY: CPT | Mod: GP

## 2024-08-15 PROCEDURE — 250N000013 HC RX MED GY IP 250 OP 250 PS 637: Performed by: INTERNAL MEDICINE

## 2024-08-15 PROCEDURE — 86140 C-REACTIVE PROTEIN: CPT | Performed by: NURSE PRACTITIONER

## 2024-08-15 PROCEDURE — 99239 HOSP IP/OBS DSCHRG MGMT >30: CPT | Performed by: NURSE PRACTITIONER

## 2024-08-15 PROCEDURE — 83735 ASSAY OF MAGNESIUM: CPT | Performed by: INTERNAL MEDICINE

## 2024-08-15 PROCEDURE — 80053 COMPREHEN METABOLIC PANEL: CPT | Performed by: NURSE PRACTITIONER

## 2024-08-15 PROCEDURE — 250N000013 HC RX MED GY IP 250 OP 250 PS 637: Performed by: NURSE PRACTITIONER

## 2024-08-15 PROCEDURE — 97530 THERAPEUTIC ACTIVITIES: CPT | Mod: GP

## 2024-08-15 RX ORDER — ISOSORBIDE MONONITRATE 30 MG/1
90 TABLET, EXTENDED RELEASE ORAL DAILY
Qty: 90 TABLET | Refills: 11 | Status: SHIPPED | OUTPATIENT
Start: 2024-08-15

## 2024-08-15 RX ORDER — POTASSIUM CHLORIDE 750 MG/1
20 TABLET, EXTENDED RELEASE ORAL ONCE
Status: COMPLETED | OUTPATIENT
Start: 2024-08-15 | End: 2024-08-15

## 2024-08-15 RX ADMIN — CLOPIDOGREL BISULFATE 75 MG: 75 TABLET ORAL at 09:06

## 2024-08-15 RX ADMIN — PANTOPRAZOLE SODIUM 40 MG: 40 TABLET, DELAYED RELEASE ORAL at 09:06

## 2024-08-15 RX ADMIN — FUROSEMIDE 40 MG: 40 TABLET ORAL at 09:06

## 2024-08-15 RX ADMIN — CYANOCOBALAMIN TAB 500 MCG 500 MCG: 500 TAB at 09:07

## 2024-08-15 RX ADMIN — ISOSORBIDE MONONITRATE 90 MG: 30 TABLET, EXTENDED RELEASE ORAL at 09:07

## 2024-08-15 RX ADMIN — ROSUVASTATIN CALCIUM 20 MG: 10 TABLET, FILM COATED ORAL at 09:06

## 2024-08-15 RX ADMIN — METOPROLOL TARTRATE 50 MG: 25 TABLET, FILM COATED ORAL at 09:06

## 2024-08-15 RX ADMIN — ALLOPURINOL 100 MG: 100 TABLET ORAL at 09:06

## 2024-08-15 RX ADMIN — TIMOLOL MALEATE 1 DROP: 5 SOLUTION/ DROPS OPHTHALMIC at 09:07

## 2024-08-15 RX ADMIN — POTASSIUM CHLORIDE 20 MEQ: 750 TABLET, EXTENDED RELEASE ORAL at 09:06

## 2024-08-15 ASSESSMENT — ACTIVITIES OF DAILY LIVING (ADL)
ADLS_ACUITY_SCORE: 36

## 2024-08-15 NOTE — PLAN OF CARE
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  She underwent pericardiocentesis on 8/8 with pericardial drain placement.  The pericardial drain was removed on 8/13.  Other PMH includes CAD S/P multiple PCIs, HFpEF S/P dual chamber pacer, HLD, HTN, CKD, DVT with IVC filter placement.     General: AOx4. NAD. Pleasant, cooperative.  Able to verbalize needs. Denies pain.   Cardiac: Denies chest pain, dizziness.  SR with atrial pacing, BBB.  Afebrile.  VSS.    Vascular:  BLE edema with lymph wraps.   Respiratory:  Denies SOB.  Unlabored.  CTAB. SpO2 > 92% on RA.   GI: Denies N/V.  Regular diet.  Last BM 8/14/24.  Declined scheduled evening doses of Senna & Miralax.   :  Voids spontaneously in toilet and wear brief for urinary leakage.    Activity: Ambulates with rolling walking and stand by assist.  Steady gait. Bed alarm on over noc.   Skin:  Old pericardial drain site with dressing CDI.  L groin site SUKHDEEP with old bruising.  Mepilex foam dressing on sacrum.   Lines:  RFA & LUE PIV SL, both flush well.     Shift Events:  No acute events over noc.    Plan is for discharge home today.

## 2024-08-15 NOTE — PROGRESS NOTES
Care Management Follow Up    Length of Stay (days): 7    Expected Discharge Date: 08/15/2024     Concerns to be Addressed:     HC  Patient plan of care discussed at interdisciplinary rounds: Yes    Anticipated Discharge Disposition: Home     Anticipated Discharge Services: HC resumption [facility]  Anticipated Discharge DME:  none new    Patient/family educated on Medicare website which has current facility and service quality ratings:  n/a  Education Provided on the Discharge Plan:  yes  Patient/Family in Agreement with the Plan:  yes    Referrals Placed by CM/SW:  none new  Private pay costs discussed: Not applicable    Additional Information:  Patient discharged to home today, was on service PTA with [facility], patient wanting to continue with HC, will be considered homebound, RNCC has placed resumption orders and updated [facility] liaison of plan. RNCC to cease following after discharge.    [facility]  Phone: [phone]  Fax: [phone]    Cullen Berg BSN, BA, RN, CMSRN, RNCC  [facility]  Covering Units 6C Beds 0619-4396/OBS  Phone: [phone]  Available on [employer] search 6C 6502-14 RNCC or OBS RNCC  After Hours [phone]     6C Beds 9969-8475  Ph: [phone]     6B/CRNCC Weekend/holiday on Doculynxera or [phone]     [facility] RNCC ED/5 Ortho/5 Med/Surg [phone]     [facility] RNCC 6 Med/Surg 8A, [phone]     Observation SW and weekend/after hours phone: [phone]

## 2024-08-15 NOTE — PLAN OF CARE
Occupational Therapy Discharge Summary    Reason for therapy discharge:    Discharged to home with home therapy.    Progress towards therapy goal(s). See goals on Care Plan in [facility] electronic health record for goal details.  Goals partially met.  Barriers to achieving goals:   discharge from facility.    Therapy recommendation(s):    Continued therapy is recommended.  Rationale/Recommendations:  Home with A and HH OT/PT.

## 2024-08-15 NOTE — TELEPHONE ENCOUNTER
Called patient and left VM with my contact information to go over date and time for visit with Randa on 8/26 and echo on 8/29

## 2024-08-15 NOTE — PLAN OF CARE
Physical Therapy Discharge Summary    Reason for therapy discharge:    Discharged to home with home therapy.    Progress towards therapy goal(s). See goals on Care Plan in [facility] electronic health record for goal details.  Goals partially met.  Barriers to achieving goals:   discharge from facility.    Therapy recommendation(s):    Continued therapy is recommended.  Rationale/Recommendations:  continue PT with home PT .

## 2024-08-15 NOTE — PROGRESS NOTES
.DISCHARGE                         8/15/2024  1:15 PM  ----------------------------------------------------------------------------  Discharged to: Home  Via: Automobile  Accompanied by: Family,    Discharge Instructions: diet, medications, follow up appointments, when to call the MD & aftercare instructions reviewed.   Prescriptions: To be filled by [facility]      pharmacy per pt's request  Follow Up Appointments: arranged; information given  Belongings: All sent with pt  IV: out  Telemetry: off  Pt exhibits understanding of above discharge instructions; all questions answered.    Discharge Paperwork: Signed, copied, and sent home with patient.

## 2024-08-16 ENCOUNTER — TELEPHONE (OUTPATIENT)
Dept: FAMILY MEDICINE | Facility: CLINIC | Age: 89
End: 2024-08-16

## 2024-08-16 NOTE — TELEPHONE ENCOUNTER
[facility] Call Center    Phone Message    May a detailed message be left on voicemail: yes     Reason for Call: Other: Pt care team is requesting verbal orders for a PT and OT to evaluate and treat. She is also requesting order for skilled nursing 2 times a week for one week and once week for 2 weeks.       Action Taken: Other: PCC    Travel Screening: Not Applicable     Date of Service: 8/16/24

## 2024-08-19 ENCOUNTER — PATIENT OUTREACH (OUTPATIENT)
Dept: CARE COORDINATION | Facility: CLINIC | Age: 89
End: 2024-08-19

## 2024-08-19 NOTE — TELEPHONE ENCOUNTER
Spoke with Yvonne with [facility] and gave the following verbal orders: PT and OT to evaluate and treat. She is also requesting order for skilled nursing 2 times a week for one week and once week for 2 weeks.  Jordyn DUQUE LPN  [facility]

## 2024-08-19 NOTE — PROGRESS NOTES
"Clinic Care Coordination Contact  Transitions of Care Outreach    Chief Complaint   Patient presents with    Clinic Care Coordination - Post Hospital    Clinic Care Coordination - Initial       Most Recent Admission Date: 8/8/2024   Most Recent Admission Diagnosis: Gastrointestinal hemorrhage, unspecified gastrointestinal hemorrhage type - K92.2  Atrial fibrillation (H) - I48.91     Most Recent Discharge Date: 8/15/2024   Most Recent Discharge Diagnosis: Gastrointestinal hemorrhage, unspecified gastrointestinal hemorrhage type - K92.2  Atrial fibrillation (H) - I48.91  Pericardial effusion - I31.39  ST elevation MI (STEMI) (H) - I21.3  Coronary artery disease of native artery of native heart with stable angina pectoris (H24) - I25.118     Transitions of Care Assessment    Discharge Assessment  How are you doing now that you are home?: RN called and spoke with patient. Patient states that she is doing well, tired all of the time \"sometimes a little hard breathing\" but doing okay. Pt states that she has not had PTA [facility] Care out to see patient since discharge. [facility] has called for verbal orders since discharge- pt states that she believes SW will be out either today or tomorrow. RN encouraged patient to call the clinic if they do not come to see her so that we can intervene. Patient states understanding. Pt has no concerns for care coordination, just to say \"hi\" to Dr. PARISI and she will see him at upcoming appt.  How are your symptoms? (Red Flag symptoms escalate to triage hotline per guidelines): Improved  Do you know how to contact your clinic care team if you have future questions or changes to your health status? : Yes  Does the patient have their discharge instructions? : Yes  Does the patient have questions regarding their discharge instructions? : No  Were you started on any new medications or were there changes to any of your previous medications? : Yes  Does the patient have all of their medications?: " Yes  Do you have questions regarding any of your medications? : No  Do you have all of your needed medical supplies or equipment (DME)?  (i.e. oxygen tank, CPAP, cane, etc.): Yes              Follow up Plan     Discharge Follow-Up  Discharge follow up appointment scheduled in alignment with recommended follow up timeframe or Transitions of Risk Category? (Low = within 30 days; Moderate= within 14 days; High= within 7 days): No  Discharge Follow Up Appointment Date: 08/26/24  Discharge Follow Up Appointment Scheduled with?: Specialty Care Provider (PCP as well on 8/29)    Future Appointments   Date Time Provider Department Center   8/26/2024  2:00 PM Randa Ann NP [facility]   8/29/2024 12:00 AM UC ICD REMOTE UCCVSV [facility]   8/29/2024  8:30 AM UCECHCR1 UCCVCV [facility]   8/29/2024  3:30 PM Pedro Gomez MD [facility]   9/20/2024 10:00 AM UU LAB GOLD WAITING [facility]OP[facility]   9/20/2024 10:30 AM UUETEER1 [facility]CV[facility]   9/20/2024  1:30 PM Randa Ann NP [facility]   10/25/2024 10:30 AM NE LAB NELABR [facility]   10/28/2024  2:10 PM Laura Llanes, PA-C [facility]   11/25/2024  9:00 AM UC CV DEVICE 1 UCCVCV [facility]   12/2/2024  9:30 AM Marzena Martin MD [facility]       Outpatient Plan as outlined on AVS reviewed with patient.      For any urgent concerns, please contact our 24 hour nurse triage line: [phone]       LUPE CALLE, CAROL

## 2024-08-26 LAB
ANTIBODY UNIDENTIFIED: NORMAL
SPECIMEN EXPIRATION DATE: NORMAL

## 2024-08-28 ENCOUNTER — DOCUMENTATION ONLY (OUTPATIENT)
Dept: FAMILY MEDICINE | Facility: CLINIC | Age: 89
End: 2024-08-28
Payer: COMMERCIAL

## 2024-08-28 ENCOUNTER — MEDICAL CORRESPONDENCE (OUTPATIENT)
Dept: HEALTH INFORMATION MANAGEMENT | Facility: CLINIC | Age: 89
End: 2024-08-28
Payer: COMMERCIAL

## 2024-08-28 NOTE — PROGRESS NOTES
Type of Form Received: [facility] [id number]    Form Received (Date) 8/27/24   Form Filled out Yes, faxed 9/9/24 & 9/25   Placed in provider folder Yes

## 2024-08-28 NOTE — PROGRESS NOTES
Type of Form Received: [facility] [id number]    Form Received (Date) 8/26/24   Form Filled out Yes, faxed 8/29   Placed in provider folder Yes

## 2024-08-29 ENCOUNTER — OFFICE VISIT (OUTPATIENT)
Dept: FAMILY MEDICINE | Facility: CLINIC | Age: 89
End: 2024-08-29
Payer: COMMERCIAL

## 2024-08-29 ENCOUNTER — ANCILLARY PROCEDURE (OUTPATIENT)
Dept: CARDIOLOGY | Facility: CLINIC | Age: 89
End: 2024-08-29
Attending: INTERNAL MEDICINE
Payer: COMMERCIAL

## 2024-08-29 ENCOUNTER — MEDICAL CORRESPONDENCE (OUTPATIENT)
Dept: HEALTH INFORMATION MANAGEMENT | Facility: CLINIC | Age: 89
End: 2024-08-29

## 2024-08-29 ENCOUNTER — LAB (OUTPATIENT)
Dept: LAB | Facility: CLINIC | Age: 89
End: 2024-08-29
Payer: COMMERCIAL

## 2024-08-29 ENCOUNTER — ANCILLARY PROCEDURE (OUTPATIENT)
Dept: CARDIOLOGY | Facility: CLINIC | Age: 89
End: 2024-08-29
Attending: NURSE PRACTITIONER
Payer: COMMERCIAL

## 2024-08-29 VITALS
HEART RATE: 87 BPM | SYSTOLIC BLOOD PRESSURE: 189 MMHG | DIASTOLIC BLOOD PRESSURE: 83 MMHG | WEIGHT: 141.3 LBS | OXYGEN SATURATION: 98 % | BODY MASS INDEX: 22.71 KG/M2 | HEIGHT: 66 IN

## 2024-08-29 DIAGNOSIS — D63.1 ANEMIA OF CHRONIC RENAL FAILURE, STAGE 4 (SEVERE) (H): ICD-10-CM

## 2024-08-29 DIAGNOSIS — D64.9 ANEMIA, UNSPECIFIED TYPE: Primary | ICD-10-CM

## 2024-08-29 DIAGNOSIS — D64.9 ANEMIA, UNSPECIFIED TYPE: ICD-10-CM

## 2024-08-29 DIAGNOSIS — I48.20 CHRONIC ATRIAL FIBRILLATION (H): ICD-10-CM

## 2024-08-29 DIAGNOSIS — K92.2 GI BLEED: ICD-10-CM

## 2024-08-29 DIAGNOSIS — N18.30 STAGE 3 CHRONIC KIDNEY DISEASE, UNSPECIFIED WHETHER STAGE 3A OR 3B CKD (H): ICD-10-CM

## 2024-08-29 DIAGNOSIS — I49.5 SICK SINUS SYNDROME (H): ICD-10-CM

## 2024-08-29 DIAGNOSIS — I10 ESSENTIAL HYPERTENSION: ICD-10-CM

## 2024-08-29 DIAGNOSIS — D64.9 ANEMIA: ICD-10-CM

## 2024-08-29 DIAGNOSIS — Z09 HOSPITAL DISCHARGE FOLLOW-UP: ICD-10-CM

## 2024-08-29 DIAGNOSIS — N18.4 ANEMIA OF CHRONIC RENAL FAILURE, STAGE 4 (SEVERE) (H): ICD-10-CM

## 2024-08-29 LAB
ACANTHOCYTES BLD QL SMEAR: NORMAL
ANION GAP SERPL CALCULATED.3IONS-SCNC: 14 MMOL/L (ref 7–15)
AUER BODIES BLD QL SMEAR: NORMAL
BASO STIPL BLD QL SMEAR: NORMAL
BASOPHILS # BLD AUTO: 0.1 10E3/UL (ref 0–0.2)
BASOPHILS NFR BLD AUTO: 2 %
BITE CELLS BLD QL SMEAR: NORMAL
BLISTER CELLS BLD QL SMEAR: NORMAL
BUN SERPL-MCNC: 24.8 MG/DL (ref 8–23)
BURR CELLS BLD QL SMEAR: NORMAL
CALCIUM SERPL-MCNC: 8.7 MG/DL (ref 8.8–10.4)
CHLORIDE SERPL-SCNC: 105 MMOL/L (ref 98–107)
CREAT SERPL-MCNC: 1.64 MG/DL (ref 0.51–0.95)
DACRYOCYTES BLD QL SMEAR: NORMAL
EGFRCR SERPLBLD CKD-EPI 2021: 30 ML/MIN/1.73M2
ELLIPTOCYTES BLD QL SMEAR: NORMAL
EOSINOPHIL # BLD AUTO: 0.2 10E3/UL (ref 0–0.7)
EOSINOPHIL NFR BLD AUTO: 3 %
ERYTHROCYTE [DISTWIDTH] IN BLOOD BY AUTOMATED COUNT: 17.9 % (ref 10–15)
FERRITIN SERPL-MCNC: 1036 NG/ML (ref 11–328)
FRAGMENTS BLD QL SMEAR: NORMAL
GLUCOSE SERPL-MCNC: 84 MG/DL (ref 70–99)
HCO3 SERPL-SCNC: 23 MMOL/L (ref 22–29)
HCT VFR BLD AUTO: 32 % (ref 35–47)
HGB BLD-MCNC: 9.7 G/DL (ref 11.7–15.7)
HGB C CRYSTALS: NORMAL
HOWELL-JOLLY BOD BLD QL SMEAR: NORMAL
IMM GRANULOCYTES # BLD: 0 10E3/UL
IMM GRANULOCYTES NFR BLD: 0 %
IRON BINDING CAPACITY (ROCHE): 232 UG/DL (ref 240–430)
IRON SATN MFR SERPL: 19 % (ref 15–46)
IRON SERPL-MCNC: 44 UG/DL (ref 37–145)
LVEF ECHO: NORMAL
LYMPHOCYTES # BLD AUTO: 2.4 10E3/UL (ref 0.8–5.3)
LYMPHOCYTES NFR BLD AUTO: 31 %
MCH RBC QN AUTO: 29.3 PG (ref 26.5–33)
MCHC RBC AUTO-ENTMCNC: 30.3 G/DL (ref 31.5–36.5)
MCV RBC AUTO: 97 FL (ref 78–100)
MONOCYTES # BLD AUTO: 1.7 10E3/UL (ref 0–1.3)
MONOCYTES NFR BLD AUTO: 22 %
NEUTROPHILS # BLD AUTO: 3.1 10E3/UL (ref 1.6–8.3)
NEUTROPHILS NFR BLD AUTO: 42 %
NEUTS HYPERSEG BLD QL SMEAR: NORMAL
NRBC # BLD AUTO: 0 10E3/UL
NRBC BLD AUTO-RTO: 0 /100
PLAT MORPH BLD: NORMAL
PLATELET # BLD AUTO: 116 10E3/UL (ref 150–450)
POLYCHROMASIA BLD QL SMEAR: NORMAL
POTASSIUM SERPL-SCNC: 4.1 MMOL/L (ref 3.4–5.3)
RBC # BLD AUTO: 3.31 10E6/UL (ref 3.8–5.2)
RBC AGGLUT BLD QL: NORMAL
RBC MORPH BLD: NORMAL
ROULEAUX BLD QL SMEAR: NORMAL
SICKLE CELLS BLD QL SMEAR: NORMAL
SMUDGE CELLS BLD QL SMEAR: NORMAL
SODIUM SERPL-SCNC: 142 MMOL/L (ref 135–145)
SPHEROCYTES BLD QL SMEAR: NORMAL
STOMATOCYTES BLD QL SMEAR: NORMAL
TARGETS BLD QL SMEAR: NORMAL
TOXIC GRANULES BLD QL SMEAR: NORMAL
VARIANT LYMPHS BLD QL SMEAR: NORMAL
WBC # BLD AUTO: 7.5 10E3/UL (ref 4–11)

## 2024-08-29 PROCEDURE — 93308 TTE F-UP OR LMTD: CPT | Performed by: INTERNAL MEDICINE

## 2024-08-29 PROCEDURE — 93321 DOPPLER ECHO F-UP/LMTD STD: CPT | Performed by: INTERNAL MEDICINE

## 2024-08-29 PROCEDURE — 82728 ASSAY OF FERRITIN: CPT | Performed by: PATHOLOGY

## 2024-08-29 PROCEDURE — 36415 COLL VENOUS BLD VENIPUNCTURE: CPT | Performed by: PATHOLOGY

## 2024-08-29 PROCEDURE — 83550 IRON BINDING TEST: CPT | Performed by: PATHOLOGY

## 2024-08-29 PROCEDURE — 83540 ASSAY OF IRON: CPT | Performed by: PATHOLOGY

## 2024-08-29 PROCEDURE — 93294 REM INTERROG EVL PM/LDLS PM: CPT | Performed by: INTERNAL MEDICINE

## 2024-08-29 PROCEDURE — 93325 DOPPLER ECHO COLOR FLOW MAPG: CPT | Performed by: INTERNAL MEDICINE

## 2024-08-29 PROCEDURE — 80048 BASIC METABOLIC PNL TOTAL CA: CPT | Performed by: PATHOLOGY

## 2024-08-29 PROCEDURE — 93296 REM INTERROG EVL PM/IDS: CPT

## 2024-08-29 PROCEDURE — 99495 TRANSJ CARE MGMT MOD F2F 14D: CPT | Performed by: FAMILY MEDICINE

## 2024-08-29 PROCEDURE — 85025 COMPLETE CBC W/AUTO DIFF WBC: CPT | Performed by: PATHOLOGY

## 2024-08-29 RX ORDER — AMLODIPINE BESYLATE 5 MG/1
5 TABLET ORAL DAILY
Qty: 90 TABLET | Refills: 3 | Status: SHIPPED | OUTPATIENT
Start: 2024-08-29

## 2024-08-29 NOTE — PROGRESS NOTES
Assessment & Plan     Anemia, unspecified type  Due; no GI bleed sx, eats well including red meat  - CBC with platelets and differential; Future    Stage 3 chronic kidney disease, unspecified whether stage 3a or 3b CKD (H)  Due  - Basic metabolic panel  (Ca, Cl, CO2, Creat, Gluc, K, Na, BUN); Future    Essential hypertension  Resume  - amLODIPine (NORVASC) 5 MG tablet; Take 1 tablet (5 mg) by mouth daily.    Hospital discharge follow-up  Rvwd        MED REC REQUIRED{  Post Medication Reconciliation Status:       No follow-ups on file.    Lashell Zarate is a 89 year old, presenting for the following health issues:  Hospital F/U (Routine)      8/29/2024     3:12 PM   Additional Questions   Roomed by KTE, EMT     HPI   No c/o since home  Stopped nvasc ten mg a day inpt; now BP high  Eats well  No GI c/o   Normal brown stool  No blood/melena per rectum    She will do flu shot at drugstore we discuss    Today echo report stable, relayed to her    Sees cardio Sept 20; will be out of town, I've asked schedulers to help move that    Labs:due for anemia, ckd recheck         Hospital Follow-up Visit:    Hospital/Nursing Home/IP Rehab Facility: [facility]  Date of Admission: August 8 2024  Date of Discharge: August 15 2024  Reason(s) for Admission: A fib, GI bleed  Was the patient in the ICU or did the patient experience delirium during hospitalization?  Yespt awake alert normal cognition in converstaion   Do you have any other stressors you would like to discuss with your provider? No    Problems taking medications regularly:  None  Medication changes since discharge: None  Problems adhering to non-medication therapy:  None    Summary of hospitalization:  [facility] discharge summary reviewed  Diagnostic Tests/Treatments reviewed.  Follow up needed: none  Other Healthcare Providers Involved in Patient s Care:         None  Update since discharge: improved.  "    Plan of care communicated with patient and family           Objective    BP (!) 191/100 (BP Location: Left arm, Patient Position: Sitting, Cuff Size: Adult Regular)   Pulse 87   Ht 1.676 m (5' 5.98\")   Wt 64.1 kg (141 lb 4.8 oz)   SpO2 98%   BMI 22.82 kg/m    Body mass index is 22.82 kg/m .  Physical Exam   GENERAL: alert and no distress  MS: no gross musculoskeletal defects noted, no edema            Signed Electronically by: Pedro Gomez MD    "

## 2024-08-30 ENCOUNTER — DOCUMENTATION ONLY (OUTPATIENT)
Dept: FAMILY MEDICINE | Facility: CLINIC | Age: 89
End: 2024-08-30
Payer: COMMERCIAL

## 2024-08-30 ENCOUNTER — MEDICAL CORRESPONDENCE (OUTPATIENT)
Dept: HEALTH INFORMATION MANAGEMENT | Facility: CLINIC | Age: 89
End: 2024-08-30
Payer: COMMERCIAL

## 2024-08-30 NOTE — PROGRESS NOTES
Type of Form Received: [facility] [id number]    Form Received (Date) 8/30/24   Form Filled out Yes, faxed 9/3/24   Placed in provider folder Yes

## 2024-09-04 ENCOUNTER — TRANSFERRED RECORDS (OUTPATIENT)
Dept: HEALTH INFORMATION MANAGEMENT | Facility: CLINIC | Age: 89
End: 2024-09-04
Payer: COMMERCIAL

## 2024-09-04 ENCOUNTER — DOCUMENTATION ONLY (OUTPATIENT)
Dept: FAMILY MEDICINE | Facility: CLINIC | Age: 89
End: 2024-09-04
Payer: COMMERCIAL

## 2024-09-04 NOTE — PROGRESS NOTES
Type of Form Received: [facility] [id number]    Form Received (Date) 9/3/24   Form Filled out Yes, faxed 9/9/24   Placed in provider folder Yes

## 2024-09-05 ENCOUNTER — MEDICAL CORRESPONDENCE (OUTPATIENT)
Dept: HEALTH INFORMATION MANAGEMENT | Facility: CLINIC | Age: 89
End: 2024-09-05
Payer: COMMERCIAL

## 2024-09-05 NOTE — TELEPHONE ENCOUNTER
Left detailed VM on confidential voicemail box for Ricardo RN with [facility] Care  with the following verbal order(s): Home Care Orders: Skilled Nursin time every other week for 9 weeks.  Jordyn DUQUE LPN  [facility] Primary [facility]

## 2024-09-05 NOTE — TELEPHONE ENCOUNTER
[facility] Call Center    Phone Message    May a detailed message be left on voicemail: yes     Reason for Call: Order(s): Home Care Orders: Skilled Nursin time every other week for 9 weeks.    Action Taken: Message routed to:  [facility] pcc    Travel Screening: Not Applicable     Date of Service:

## 2024-09-06 ENCOUNTER — PATIENT OUTREACH (OUTPATIENT)
Dept: CARE COORDINATION | Facility: CLINIC | Age: 89
End: 2024-09-06
Payer: COMMERCIAL

## 2024-09-06 DIAGNOSIS — K21.9 GASTROESOPHAGEAL REFLUX DISEASE WITHOUT ESOPHAGITIS: ICD-10-CM

## 2024-09-06 NOTE — PROGRESS NOTES
Anemia Management Note - Follow Up      SUBJECTIVE/OBJECTIVE:    Referred by Dr. Marzena Martin on 2024  Primary Diagnosis: Anemia in Chronic Kidney Disease (N18.4, D63.1)     Secondary Diagnosis: Chronic Kidney Disease, Stage 4 (N18.4)   Hgb goal range: 9-10     Epo/Darbo: TBD; treat with IV Iron per clinic note on 24.   Iron regimen: Treat with IV Iron; Oral Iron stopped  *Note form 24; Recommended to stop oral iron and start IV iron due to potential for GI bleeding and difficulty in knowing if black stools are from iron or bleeding.       Labs : 2025  RX/TX plans : TBD     Recent BREANNA use, transfusion, IV iron: Venofer and PRBCs  Hx of CAD, NTEMI (), HTN, Pacemaker, AFib, Anticoagulated.     Contact: Consent to Communicate scanned on 2019.         Latest Ref Rng & Units 2024 8/10/2024 2024 2024 2024 8/15/2024 2024   Anemia   Hemoglobin 11.7 - 15.7 g/dL 9.0     9.3  9.0  8.1  8.4  8.2  8.3  9.7    TSAT 15 - 46 %       19    Ferritin 11 - 328 ng/mL       1,036        Multiple values from one day are sorted in reverse-chronological order     BP Readings from Last 3 Encounters:   24 (!) 189/83   08/15/24 123/58   24 (!) 147/74     Wt Readings from Last 2 Encounters:   24 64.1 kg (141 lb 4.8 oz)   08/15/24 66.5 kg (146 lb 11.2 oz)           ASSESSMENT:    Hgb:at goal - continue to monitor  TSat: not at goal (>30%) but ferritin >1000ng/mL.  PO iron not indicated at this time per anemia protocol.   Ferritin: Elevated (>1000ng/mL)        PLAN:  RTC for Anemia Labs the end of 2024.    Orders needed to be renewed (for next follow-up date) in EPIC: None    Iron labs due:  End of -    Plan discussed with:  No call, chart review       NEXT FOLLOW-UP DATE:  24    Barbara Dimas RN   Anemia Services  [facility]  [email or web address]   Office : [phone]  Fax: [phone]

## 2024-09-07 ENCOUNTER — MEDICAL CORRESPONDENCE (OUTPATIENT)
Dept: HEALTH INFORMATION MANAGEMENT | Facility: CLINIC | Age: 89
End: 2024-09-07

## 2024-09-09 DIAGNOSIS — I25.118 CORONARY ARTERY DISEASE OF NATIVE ARTERY OF NATIVE HEART WITH STABLE ANGINA PECTORIS (H): Primary | ICD-10-CM

## 2024-09-10 DIAGNOSIS — K21.9 GASTROESOPHAGEAL REFLUX DISEASE WITHOUT ESOPHAGITIS: ICD-10-CM

## 2024-09-10 RX ORDER — OMEPRAZOLE 40 MG/1
40 CAPSULE, DELAYED RELEASE ORAL DAILY
Qty: 90 CAPSULE | Refills: 3 | Status: SHIPPED | OUTPATIENT
Start: 2024-09-10

## 2024-09-10 NOTE — TELEPHONE ENCOUNTER
Medication Requested:   Disp Refills Start End SEB   omeprazole (PRILOSEC) 40 MG DR capsule 90 capsule 3 8/31/2023 -- No   Sig - Route: Take 1 capsule (40 mg) by mouth daily - Oral     ----------------------  Last Office Visit :     8/29/2024  [facility]      Future Office visit:     10/17/2024 12:00 PM (30 min)  Сергей   Arrive by: 11:45 AM   [facility] RETURN   [facility]   Pedro Gomez MD     ----------------------      Refill decision: Medication refilled per protocol.    Pass/Fail Protocol Criteria:    PPI Protocol Vhfrtk66/10/2024 03:07 PM   Protocol Details Medication is active on med list    Medication indicated for associated diagnosis    Recent (12 mo) or future (90 days) visit within the authorizing provider's specialty    Patient is age 18 or older    No active pregnacy on record    No positive pregnancy test in past 12 months

## 2024-09-11 NOTE — TELEPHONE ENCOUNTER
rosuvastatin (CRESTOR) 20 MG tablet       Last Written Prescription Date:  historical  Last Fill Quantity: na,   # refills: na  Last Office Visit : 8/29/24  Future Office visit:  10/17/24  LDL Cholesterol Calculated   Date Value Ref Range Status   11/20/2023 36 <=100 mg/dL Final   08/26/2020 71 <100 mg/dL Final     Comment:     Desirable:       <100 mg/dl      Routing refill request to provider for review/approval because:  Medication is reported/historical

## 2024-09-12 RX ORDER — ROSUVASTATIN CALCIUM 20 MG/1
20 TABLET, COATED ORAL DAILY
Qty: 90 TABLET | Refills: 1 | Status: SHIPPED | OUTPATIENT
Start: 2024-09-12

## 2024-09-15 LAB
MDC_IDC_EPISODE_DTM: NORMAL
MDC_IDC_EPISODE_DURATION: 24 S
MDC_IDC_EPISODE_DURATION: 94 S
MDC_IDC_EPISODE_DURATION: NORMAL S
MDC_IDC_EPISODE_ID: 14
MDC_IDC_EPISODE_ID: 15
MDC_IDC_EPISODE_ID: 16
MDC_IDC_EPISODE_TYPE: NORMAL
MDC_IDC_LEAD_CONNECTION_STATUS: NORMAL
MDC_IDC_LEAD_CONNECTION_STATUS: NORMAL
MDC_IDC_LEAD_IMPLANT_DT: NORMAL
MDC_IDC_LEAD_IMPLANT_DT: NORMAL
MDC_IDC_LEAD_LOCATION: NORMAL
MDC_IDC_LEAD_LOCATION: NORMAL
MDC_IDC_LEAD_LOCATION_DETAIL_1: NORMAL
MDC_IDC_LEAD_LOCATION_DETAIL_1: NORMAL
MDC_IDC_LEAD_MFG: NORMAL
MDC_IDC_LEAD_MFG: NORMAL
MDC_IDC_LEAD_MODEL: NORMAL
MDC_IDC_LEAD_MODEL: NORMAL
MDC_IDC_LEAD_POLARITY_TYPE: NORMAL
MDC_IDC_LEAD_POLARITY_TYPE: NORMAL
MDC_IDC_LEAD_SERIAL: NORMAL
MDC_IDC_LEAD_SERIAL: NORMAL
MDC_IDC_LEAD_SPECIAL_FUNCTION: NORMAL
MDC_IDC_LEAD_SPECIAL_FUNCTION: NORMAL
MDC_IDC_MSMT_BATTERY_DTM: NORMAL
MDC_IDC_MSMT_BATTERY_REMAINING_LONGEVITY: 107 MO
MDC_IDC_MSMT_BATTERY_RRT_TRIGGER: 2.62
MDC_IDC_MSMT_BATTERY_STATUS: NORMAL
MDC_IDC_MSMT_BATTERY_VOLTAGE: 3 V
MDC_IDC_MSMT_LEADCHNL_RA_IMPEDANCE_VALUE: 266 OHM
MDC_IDC_MSMT_LEADCHNL_RA_IMPEDANCE_VALUE: 323 OHM
MDC_IDC_MSMT_LEADCHNL_RA_PACING_THRESHOLD_AMPLITUDE: 0.62 V
MDC_IDC_MSMT_LEADCHNL_RA_PACING_THRESHOLD_PULSEWIDTH: 0.4 MS
MDC_IDC_MSMT_LEADCHNL_RA_SENSING_INTR_AMPL: 1.88 MV
MDC_IDC_MSMT_LEADCHNL_RA_SENSING_INTR_AMPL: 1.88 MV
MDC_IDC_MSMT_LEADCHNL_RV_IMPEDANCE_VALUE: 361 OHM
MDC_IDC_MSMT_LEADCHNL_RV_IMPEDANCE_VALUE: 418 OHM
MDC_IDC_MSMT_LEADCHNL_RV_PACING_THRESHOLD_AMPLITUDE: 1 V
MDC_IDC_MSMT_LEADCHNL_RV_PACING_THRESHOLD_PULSEWIDTH: 0.4 MS
MDC_IDC_MSMT_LEADCHNL_RV_SENSING_INTR_AMPL: 9.62 MV
MDC_IDC_MSMT_LEADCHNL_RV_SENSING_INTR_AMPL: 9.62 MV
MDC_IDC_PG_IMPLANT_DTM: NORMAL
MDC_IDC_PG_MFG: NORMAL
MDC_IDC_PG_MODEL: NORMAL
MDC_IDC_PG_SERIAL: NORMAL
MDC_IDC_PG_TYPE: NORMAL
MDC_IDC_SESS_CLINIC_NAME: NORMAL
MDC_IDC_SESS_DTM: NORMAL
MDC_IDC_SESS_TYPE: NORMAL
MDC_IDC_SET_BRADY_AT_MODE_SWITCH_RATE: 171 {BEATS}/MIN
MDC_IDC_SET_BRADY_HYSTRATE: NORMAL
MDC_IDC_SET_BRADY_LOWRATE: 60 {BEATS}/MIN
MDC_IDC_SET_BRADY_MAX_SENSOR_RATE: 130 {BEATS}/MIN
MDC_IDC_SET_BRADY_MAX_TRACKING_RATE: 130 {BEATS}/MIN
MDC_IDC_SET_BRADY_MODE: NORMAL
MDC_IDC_SET_BRADY_PAV_DELAY_LOW: 180 MS
MDC_IDC_SET_BRADY_SAV_DELAY_LOW: 150 MS
MDC_IDC_SET_LEADCHNL_RA_PACING_AMPLITUDE: 1.5 V
MDC_IDC_SET_LEADCHNL_RA_PACING_ANODE_ELECTRODE_1: NORMAL
MDC_IDC_SET_LEADCHNL_RA_PACING_ANODE_LOCATION_1: NORMAL
MDC_IDC_SET_LEADCHNL_RA_PACING_CAPTURE_MODE: NORMAL
MDC_IDC_SET_LEADCHNL_RA_PACING_CATHODE_ELECTRODE_1: NORMAL
MDC_IDC_SET_LEADCHNL_RA_PACING_CATHODE_LOCATION_1: NORMAL
MDC_IDC_SET_LEADCHNL_RA_PACING_POLARITY: NORMAL
MDC_IDC_SET_LEADCHNL_RA_PACING_PULSEWIDTH: 0.4 MS
MDC_IDC_SET_LEADCHNL_RA_SENSING_ANODE_ELECTRODE_1: NORMAL
MDC_IDC_SET_LEADCHNL_RA_SENSING_ANODE_LOCATION_1: NORMAL
MDC_IDC_SET_LEADCHNL_RA_SENSING_CATHODE_ELECTRODE_1: NORMAL
MDC_IDC_SET_LEADCHNL_RA_SENSING_CATHODE_LOCATION_1: NORMAL
MDC_IDC_SET_LEADCHNL_RA_SENSING_POLARITY: NORMAL
MDC_IDC_SET_LEADCHNL_RA_SENSING_SENSITIVITY: 0.3 MV
MDC_IDC_SET_LEADCHNL_RV_PACING_AMPLITUDE: 2 V
MDC_IDC_SET_LEADCHNL_RV_PACING_ANODE_ELECTRODE_1: NORMAL
MDC_IDC_SET_LEADCHNL_RV_PACING_ANODE_LOCATION_1: NORMAL
MDC_IDC_SET_LEADCHNL_RV_PACING_CAPTURE_MODE: NORMAL
MDC_IDC_SET_LEADCHNL_RV_PACING_CATHODE_ELECTRODE_1: NORMAL
MDC_IDC_SET_LEADCHNL_RV_PACING_CATHODE_LOCATION_1: NORMAL
MDC_IDC_SET_LEADCHNL_RV_PACING_POLARITY: NORMAL
MDC_IDC_SET_LEADCHNL_RV_PACING_PULSEWIDTH: 0.4 MS
MDC_IDC_SET_LEADCHNL_RV_SENSING_ANODE_ELECTRODE_1: NORMAL
MDC_IDC_SET_LEADCHNL_RV_SENSING_ANODE_LOCATION_1: NORMAL
MDC_IDC_SET_LEADCHNL_RV_SENSING_CATHODE_ELECTRODE_1: NORMAL
MDC_IDC_SET_LEADCHNL_RV_SENSING_CATHODE_LOCATION_1: NORMAL
MDC_IDC_SET_LEADCHNL_RV_SENSING_POLARITY: NORMAL
MDC_IDC_SET_LEADCHNL_RV_SENSING_SENSITIVITY: 0.9 MV
MDC_IDC_SET_ZONE_DETECTION_INTERVAL: 350 MS
MDC_IDC_SET_ZONE_DETECTION_INTERVAL: 400 MS
MDC_IDC_SET_ZONE_STATUS: NORMAL
MDC_IDC_SET_ZONE_STATUS: NORMAL
MDC_IDC_SET_ZONE_TYPE: NORMAL
MDC_IDC_SET_ZONE_VENDOR_TYPE: NORMAL
MDC_IDC_STAT_AT_BURDEN_PERCENT: 1.4 %
MDC_IDC_STAT_AT_DTM_END: NORMAL
MDC_IDC_STAT_AT_DTM_START: NORMAL
MDC_IDC_STAT_BRADY_AP_VP_PERCENT: 0.04 %
MDC_IDC_STAT_BRADY_AP_VS_PERCENT: 46.81 %
MDC_IDC_STAT_BRADY_AS_VP_PERCENT: 0.03 %
MDC_IDC_STAT_BRADY_AS_VS_PERCENT: 53.13 %
MDC_IDC_STAT_BRADY_DTM_END: NORMAL
MDC_IDC_STAT_BRADY_DTM_START: NORMAL
MDC_IDC_STAT_BRADY_RA_PERCENT_PACED: 49.12 %
MDC_IDC_STAT_BRADY_RV_PERCENT_PACED: 0.09 %
MDC_IDC_STAT_EPISODE_RECENT_COUNT: 0
MDC_IDC_STAT_EPISODE_RECENT_COUNT: 3
MDC_IDC_STAT_EPISODE_RECENT_COUNT_DTM_END: NORMAL
MDC_IDC_STAT_EPISODE_RECENT_COUNT_DTM_START: NORMAL
MDC_IDC_STAT_EPISODE_TOTAL_COUNT: 0
MDC_IDC_STAT_EPISODE_TOTAL_COUNT: 0
MDC_IDC_STAT_EPISODE_TOTAL_COUNT: 1
MDC_IDC_STAT_EPISODE_TOTAL_COUNT: 1
MDC_IDC_STAT_EPISODE_TOTAL_COUNT: 13
MDC_IDC_STAT_EPISODE_TOTAL_COUNT_DTM_END: NORMAL
MDC_IDC_STAT_EPISODE_TOTAL_COUNT_DTM_START: NORMAL
MDC_IDC_STAT_EPISODE_TYPE: NORMAL
MDC_IDC_STAT_TACHYTHERAPY_RECENT_DTM_END: NORMAL
MDC_IDC_STAT_TACHYTHERAPY_RECENT_DTM_START: NORMAL
MDC_IDC_STAT_TACHYTHERAPY_TOTAL_DTM_END: NORMAL
MDC_IDC_STAT_TACHYTHERAPY_TOTAL_DTM_START: NORMAL

## 2024-09-16 RX ORDER — OMEPRAZOLE 40 MG/1
40 CAPSULE, DELAYED RELEASE ORAL DAILY
Qty: 90 CAPSULE | Refills: 3 | OUTPATIENT
Start: 2024-09-16

## 2024-09-16 NOTE — TELEPHONE ENCOUNTER
omeprazole (PRILOSEC) 40 MG DR capsule       Last Written Prescription Date:  9/10/24  Last Fill Quantity: 90,   # refills: 3  Last Office Visit : 8/29/24  Future Office visit:  10/17/24    Routing refill request to provider for review/approval because:    Pharmacy comment: Drug-Drug interaction Clopidogrel + Omeprazole decreased clopidogrel efficacy. Please advise.     Haydee FERNANDEZ RN  [facility]/Red Flag Triage & Med Refill Team

## 2024-09-17 ENCOUNTER — DOCUMENTATION ONLY (OUTPATIENT)
Dept: FAMILY MEDICINE | Facility: CLINIC | Age: 89
End: 2024-09-17
Payer: COMMERCIAL

## 2024-09-17 NOTE — PROGRESS NOTES
Type of Form Received: [facility] [id number]    Form Received (Date) 9/16/24   Form Filled out Yes, faxed 9/23/24   Placed in provider folder Yes

## 2024-09-20 ENCOUNTER — TELEPHONE (OUTPATIENT)
Dept: FAMILY MEDICINE | Facility: CLINIC | Age: 89
End: 2024-09-20

## 2024-09-20 DIAGNOSIS — K21.9 GASTROESOPHAGEAL REFLUX DISEASE WITHOUT ESOPHAGITIS: ICD-10-CM

## 2024-09-20 DIAGNOSIS — Z53.9 DIAGNOSIS NOT YET DEFINED: Primary | ICD-10-CM

## 2024-09-20 NOTE — TELEPHONE ENCOUNTER
[facility] Call [facility]    Phone Message    May a detailed message be left on voicemail: yes     Reason for Call: Other: On order number [id number] there are 2 different signature dates, they can't have that. Please fix with same signature dates and initial if you fix. Shelia re-faxed it many times, but she has not heard anything. Please advise.

## 2024-09-23 ENCOUNTER — OFFICE VISIT (OUTPATIENT)
Dept: CARDIOLOGY | Facility: CLINIC | Age: 89
End: 2024-09-23
Attending: NURSE PRACTITIONER
Payer: COMMERCIAL

## 2024-09-23 ENCOUNTER — TELEPHONE (OUTPATIENT)
Dept: FAMILY MEDICINE | Facility: CLINIC | Age: 89
End: 2024-09-23
Payer: COMMERCIAL

## 2024-09-23 VITALS
HEART RATE: 71 BPM | BODY MASS INDEX: 22.91 KG/M2 | SYSTOLIC BLOOD PRESSURE: 160 MMHG | DIASTOLIC BLOOD PRESSURE: 81 MMHG | OXYGEN SATURATION: 98 % | WEIGHT: 141.9 LBS

## 2024-09-23 DIAGNOSIS — I31.39 PERICARDIAL EFFUSION: ICD-10-CM

## 2024-09-23 DIAGNOSIS — K92.2 GASTROINTESTINAL HEMORRHAGE, UNSPECIFIED GASTROINTESTINAL HEMORRHAGE TYPE: ICD-10-CM

## 2024-09-23 DIAGNOSIS — I48.0 PAROXYSMAL ATRIAL FIBRILLATION (H): ICD-10-CM

## 2024-09-23 DIAGNOSIS — Z95.818 PRESENCE OF WATCHMAN LEFT ATRIAL APPENDAGE CLOSURE DEVICE: Primary | ICD-10-CM

## 2024-09-23 PROCEDURE — 99214 OFFICE O/P EST MOD 30 MIN: CPT | Performed by: NURSE PRACTITIONER

## 2024-09-23 PROCEDURE — G0463 HOSPITAL OUTPT CLINIC VISIT: HCPCS | Performed by: NURSE PRACTITIONER

## 2024-09-23 ASSESSMENT — PAIN SCALES - GENERAL: PAINLEVEL: NO PAIN (0)

## 2024-09-23 NOTE — LETTER
9/23/2024      RE: Tessa Mansfield  [address]  [facility] [address]       Dear Colleague,    Thank you for the opportunity to participate in the care of your patient, Tessa Mansfield, at the [facility] [place] at [facility]. Please see a copy of my visit note below.        STRUCTURAL HEART CARE  CARDIOVASCULAR DIVISION    STRUCTURAL CLINIC RETURN VISIT    PRIMARY CARDIOLOGIST: Dr. Santoyo      PERTINENT CLINICAL HISTORY:     Tessa Mansfield is a very pleasant 89 year old female who presents for 45 day Watchman follow-up. 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 She underwent successful left atrial appendage closure with a 31mm WATCHMAN FLX device on August 8, 2024. Her intra procedural MICHELL demonstrated a new pericardial effusion without tamponade physiology. Patient underwent pericardiocentesis on 8/8 w/ 220 mL and drain placement. Repeat pericardioscentesis 8/11 with additional 100 mL fluid removed, drain removed 8/13 at which time echo showed trace effusion. She was discharged on Plavix monotherapy. Repeat TTE 8/29/24 showed trivial effusion.     She reports feeling well since the procedure. She is fully recovered after her pericardiocentesis. She denies any residual chest pain or chest pressure. She denies SOB with regular activities, but has SOB with overexertion. This is unchanged. No orthopnea, PND, leg swelling, weight gain. No palpitations, lightheadedness or syncope. Puncture site from pericardiocentesis and groin are fully healed. She is on Plavix monotherapy. No GI bleeding.      PAST MEDICAL HISTORY:   # intolerance to AC due to GIB s/p Watchman (8/4/24)  # pericardial effusion s/p pericardiocentesis 8/8, 8/11  # CAD s/p multiple PCIs  # HLD  # HTN  # Chronic diastolic heart failure  #  SSS s/p dual chamber PPM  # Concern for infection  # Leukocytosis  # SHAJI on CKD IV  # Elevated LFT's  # Acute blood loss anemia, stable  # Anemia of chronic disease  # History of GIB 2/2 AVM's  # Left groin hematoma  # Discharge planning       PAST SURGICAL HISTORY:     Past Surgical History:   Procedure Laterality Date     CHOLECYSTECTOMY       CV CORONARY ANGIOGRAM N/A 6/17/2022    Procedure: Coronary Angiogram;  Surgeon: Constantine Macias MD;  Location: [facility] CARDIAC CATH LAB     CV CORONARY ANGIOGRAM N/A 7/17/2023    Procedure: Coronary Angiogram;  Surgeon: Constantine Macias MD;  Location: [facility] CARDIAC CATH LAB     CV LEFT ATRIAL APPENDAGE CLOSURE N/A 8/8/2024    Procedure: Left Atrial Appendage Closure;  Surgeon: Rodrick Garcia MD;  Location: [facility] CARDIAC CATH LAB     CV PCI N/A 6/17/2022    Procedure: Percutaneous Coronary Intervention;  Surgeon: Constantine Macias MD;  Location: [facility] CARDIAC CATH LAB     CV PCI N/A 7/17/2023    Procedure: Percutaneous Coronary Intervention;  Surgeon: Constantine Macias MD;  Location: [facility] CARDIAC CATH LAB     CV PCI N/A 11/6/2023    Procedure: Percutaneous Coronary Intervention;  Surgeon: Constantine Macias MD;  Location: [facility] CARDIAC CATH LAB     CV PERICARDIOCENTESIS N/A 8/8/2024    Procedure: Pericardiocentesis;  Surgeon: Rodrick Garcia MD;  Location: [facility] CARDIAC CATH LAB     CV PERICARDIOCENTESIS N/A 8/11/2024    Procedure: Pericardial Drain Adjustment, Possible New Pericardial Drain Placement;  Surgeon: Rodrick Garcia MD;  Location: [facility] CARDIAC CATH LAB     CV RIGHT HEART CATH MEASUREMENTS RECORDED N/A 6/17/2022    Procedure: Right Heart Catheterization;  Surgeon: Constantine Macias MD;  Location: [facility] CARDIAC CATH LAB     EP PACEMAKER N/A 10/15/2019    Procedure: EP PACEMAKER;  Surgeon: Anthony Zhu MD;  Location: [facility] CARDIAC CATH LAB     ESOPHAGOSCOPY, GASTROSCOPY, DUODENOSCOPY  (EGD), COMBINED N/A 7/20/2023    Procedure: Esophagoscopy, gastroscopy, duodenoscopy (EGD), combined;  Surgeon: Bekah Dash DO;  Location: [facility]     ESOPHAGOSCOPY, GASTROSCOPY, DUODENOSCOPY (EGD), COMBINED N/A 5/2/2024    Procedure: Esophagoscopy, gastroscopy, duodenoscopy (EGD), combined;  Surgeon: Tavo Donaldson MD;  Location: [facility]     HC PPM INSERTION NEW/REPLACEMENT W/ ATRIAL&VENTRICULAR LEAD  11-     HYSTERECTOMY       LAPAROTOMY EXPLORATORY N/A 4/13/2024    Procedure: Laparotomy exploratory, Wound Vac Placement.;  Surgeon: Anthony Trammell MD;  Location: [facility]     LAPAROTOMY EXPLORATORY N/A 4/14/2024    Procedure: Abdominal Re-Exploration and Closure;  Surgeon: Anthony Trammell MD;  Location: [facility]     LAPAROTOMY EXPLORATORY N/A 4/13/2024    Procedure: Exploratory Laparotomy;  Surgeon: Anthony Trammell MD;  Location: [facility]        CURRENT MEDICATIONS:     Current Outpatient Medications   Medication Sig Dispense Refill     acetaminophen (TYLENOL) 325 MG tablet Take 975 mg by mouth nightly as needed for pain       allopurinol (ZYLOPRIM) 100 MG tablet Take 1 tablet (100 mg) by mouth daily 90 tablet 0     amLODIPine (NORVASC) 5 MG tablet Take 1 tablet (5 mg) by mouth daily. 90 tablet 3     clopidogrel (PLAVIX) 75 MG tablet Take 1 tablet (75 mg) by mouth daily 90 tablet 1     fluticasone-salmeterol (ADVAIR) 250-50 MCG/ACT inhaler INHALE 1 DOSE BY MOUTH TWICE DAILY 60 each 8     furosemide (LASIX) 40 MG tablet Take 1 tablet (40 mg) by mouth daily May take an additional 20 mg at noon as needed for swelling. 90 tablet 2     isosorbide mononitrate (IMDUR) 30 MG 24 hr tablet Take 3 tablets (90 mg) by mouth daily 90 tablet 11     methocarbamol (ROBAXIN) 500 MG tablet Take 1 tablet (500 mg) by mouth every 6 hours as needed for muscle spasms 30 tablet 0     metoprolol tartrate (LOPRESSOR) 25 MG tablet Take 2 tablets (50 mg) by mouth 2 times daily 180 tablet 3     Multiple  "Vitamins-Minerals (PRESERVISION AREDS 2+MULTI VIT PO) Take 1 capsule by mouth 2 times daily       omeprazole (PRILOSEC) 40 MG DR capsule Take 1 capsule (40 mg) by mouth daily. 90 capsule 3     potassium chloride ER (K-TAB) 20 MEQ CR tablet Take 1 tablet (20 mEq) by mouth daily 90 tablet 3     rosuvastatin (CRESTOR) 20 MG tablet Take 1 tablet (20 mg) by mouth daily. 90 tablet 1     sodium bicarbonate 650 MG tablet Take 1 tablet (650 mg) by mouth 2 times daily 60 tablet 11     timolol maleate (TIMOPTIC) 0.5 % ophthalmic solution INSTILL 1 DROP INTO EACH EYE ONCE DAILY IN THE MORNING       vitamin B-12 (CYANOCOBALAMIN) 500 MCG tablet Take 1 tablet by mouth daily       vitamin D3 25 mcg (1000 units) tablet Take 1 tablet (25 mcg) by mouth every other day 90 tablet 3        ALLERGIES:     Allergies   Allergen Reactions     Codeine Sulfate Itching     Shrimp Swelling     Oxycodone Other (See Comments)     Shrimp Extract Swelling     Bactrim [Sulfamethoxazole W/Trimethoprim]      Patient unable to recall     Biaxin [Clarithromycin]      Chlorthalidone Nausea and Vomiting     Clonidine      Darvon [Propoxyphene Hcl]      Dilaudid [Hydromorphone] Visual Disturbance and Hallucination     Tolerated in April 2024 hospital admissions     Gabapentin Fatigue and Confusion     \"felt drunk\"     Indomethacin      Levaquin [Levofloxacin Hemihydrate]      Morphine Sulfate      Percocet [Oxycodone-Acetaminophen] Hallucination     Pregabalin Itching and Fatigue     Simvastatin Palpitations     Muscle weakness, leg cramping       Spironolactone      Dehydrated       Terazosin         FAMILY HISTORY:     Family History   Problem Relation Age of Onset     Cancer Sister 82        bladder cancer        SOCIAL HISTORY:     Social History     Socioeconomic History     Marital status:      Number of children: 4   Occupational History     Employer: [employer]   Tobacco Use     Smoking status: Former     Current packs/day: 0.30 "     Average packs/day: 0.3 packs/day for 15.0 years (4.5 ttl pk-yrs)     Types: Cigarettes     Smokeless tobacco: Never     Tobacco comments:     Quit 35+ years ago   Vaping Use     Vaping status: Never Used   Substance and Sexual Activity     Drug use: No     Sexual activity: Not Currently     Partners: Male     Birth control/protection: Post-menopausal   Other Topics Concern     Blood Transfusions Yes     Exercise No     Social Determinants of Health     Financial Resource Strain: Low Risk  (11/15/2023)    Financial Resource Strain      Within the past 12 months, have you or your family members you live with been unable to get utilities (heat, electricity) when it was really needed?: No   Food Insecurity: Low Risk  (11/15/2023)    Food Insecurity      Within the past 12 months, did you worry that your food would run out before you got money to buy more?: No      Within the past 12 months, did the food you bought just not last and you didn t have money to get more?: No   Transportation Needs: Low Risk  (11/15/2023)    Transportation Needs      Within the past 12 months, has lack of transportation kept you from medical appointments, getting your medicines, non-medical meetings or appointments, work, or from getting things that you need?: No   Interpersonal Safety: Low Risk  (11/15/2023)    Interpersonal Safety      Do you feel physically and emotionally safe where you currently live?: Yes      Within the past 12 months, have you been hit, slapped, kicked or otherwise physically hurt by someone?: No      Within the past 12 months, have you been humiliated or emotionally abused in other ways by your partner or ex-partner?: No   Housing Stability: Low Risk  (11/15/2023)    Housing Stability      Do you have housing? : Yes      Are you worried about losing your housing?: No        REVIEW OF SYSTEMS:     Constitutional: No fevers or chills  Skin: No new rash or itching  Eyes: No acute change in vision  Ears/Nose/Throat:  No purulent rhinorrhea, new hearing loss, or new vertigo  Respiratory: No cough or hemoptysis  Cardiovascular: See HPI  Gastrointestinal: No change in appetite, vomiting, hematemesis or diarrhea  Genitourinary: No dysuria or hematuria  Musculoskeletal: No new back pain, neck pain or muscle pain  Neurologic: No new headaches, focal weakness or behavior changes  Psychiatric: No hallucinations, excessive alcohol consumption or illegal drug usage  Hematologic/Lymphatic/Immunologic: No bleeding, chills, fever, night sweats or weight loss  Endocrine: No new cold intolerance, heat intolerance, polyphagia, polydipsia or polyuria      PHYSICAL EXAMINATION:     BP (!) 160/81 (BP Location: Left arm, Patient Position: Chair, Cuff Size: Adult Regular)   Pulse 71   Wt 64.4 kg (141 lb 14.4 oz)   SpO2 98%   BMI 22.91 kg/m      GENERAL: No acute distress.  HEENT: EOMI. Sclerae white, not injected. Nares clear. Pharynx without erythema or exudate.   Neck: No adenopathy. No thyromegaly. No jugular venous distension.   Heart: Regular rate and rhythm. No murmur.   Lungs: Clear to auscultation. No ronchi, wheezes, rales.   Abdomen: Soft, nontender, nondistended. Bowel sounds present.  Extremities: No clubbing, cyanosis, or edema.   Neurologic: Alert and oriented to person/place/time, normal speech and affect. No focal deficits.  Skin: No petechiae, purpura or rash.     LABORATORY DATA:     LIPID RESULTS:  Lab Results   Component Value Date    CHOL 105 11/20/2023    CHOL 153 08/26/2020    HDL 44 (L) 11/20/2023    HDL 52 08/26/2020    LDL 36 11/20/2023    LDL 71 08/26/2020    TRIG 124 11/20/2023    TRIG 149 08/26/2020    CHOLHDLRATIO 2.8 12/30/2014       LIVER ENZYME RESULTS:  Lab Results   Component Value Date    AST 63 (H) 08/15/2024    AST 15 06/25/2018    ALT 50 08/15/2024    ALT 14 06/25/2018       CBC RESULTS:  Lab Results   Component Value Date    WBC 7.5 08/29/2024    WBC 5.4 08/26/2020    RBC 3.31 (L) 08/29/2024    RBC 3.58  "(L) 08/26/2020    HGB 9.7 (L) 08/29/2024    HGB 11.2 (L) 08/26/2020    HCT 32.0 (L) 08/29/2024    HCT 36.0 08/26/2020    MCV 97 08/29/2024     (H) 08/26/2020    MCH 29.3 08/29/2024    MCH 31.3 08/26/2020    MCHC 30.3 (L) 08/29/2024    MCHC 31.1 (L) 08/26/2020    RDW 17.9 (H) 08/29/2024    RDW 14.9 08/26/2020     (L) 08/29/2024     (L) 08/26/2020       BMP RESULTS:  Lab Results   Component Value Date     08/29/2024     08/26/2020    POTASSIUM 4.1 08/29/2024    POTASSIUM 3.5 08/08/2024    POTASSIUM 4.8 11/21/2022    POTASSIUM 4.4 08/26/2020    CHLORIDE 105 08/29/2024    CHLORIDE 110 (H) 11/21/2022    CHLORIDE 111 (H) 08/26/2020    CO2 23 08/29/2024    CO2 24 11/21/2022    CO2 24 08/26/2020    ANIONGAP 14 08/29/2024    ANIONGAP 6 11/21/2022    ANIONGAP 8 08/26/2020    GLC 84 08/29/2024     (H) 08/09/2024    GLC 79 11/21/2022    GLC 84 08/26/2020    BUN 24.8 (H) 08/29/2024    BUN 35 (H) 11/21/2022    BUN 41 (H) 08/26/2020    CR 1.64 (H) 08/29/2024    CR 1.70 (H) 08/26/2020    GFRESTIMATED 30 (L) 08/29/2024    GFRESTIMATED 27 (L) 08/26/2020    GFRESTBLACK 32 (L) 08/26/2020    ALEXANDRO 8.7 (L) 08/29/2024    ALEXANDRO 9.0 08/26/2020        A1C RESULTS:  No results found for: \"A1C\"    INR RESULTS:  Lab Results   Component Value Date    INR 1.43 (H) 08/10/2024    INR 1.31 (H) 08/09/2024    INR 1.08 12/27/2013    INR 1.15 (H) 08/06/2010          PROCEDURES & FURTHER ASSESSMENTS:     MICHELL 10/1/24    Interpretation Summary  MICHELL ordered after placement of 31mm Watchman device on 8/8/24 for interval  evaluation.     The left atrial occlusion device is well seated. There is no caridad-device color  flow on Doppler interrogation. There is no device-associated thrombus.     The atrial septum is intact as assessed by color Doppler.     Moderate mitral insufficiency was present (likely secondary to hypertension,  systolic BP was 180 mmHg at the beginning of the exam). ERO area 0.22 cm^2,  MR  volume 46 " mL.  ______________________________________________________________________________  Procedure  Transesophageal Echocardiogram with color and spectral Doppler performed.  Procedure location Echo Lab. The procedure was performed in the Echo Lab.  Informed consent for Transesophegeal echo obtained. MICHELL Probe #67 was used  during the procedure. Patient was sedated using Fentanyl 25 mcg. Patient was  sedated using Versed 3 mg. The heart rate, respiratory rate, oxygen  saturations, blood pressure, and response to care were monitored throughout  the procedure with the assistance of the nurse. I determined this patient to  be an appropriate candidate for the planned sedation and procedure and have  reassessed the patient immediately prior to sedation and procedure. Total  sedation time: 37 minutes of continuous bedside 1:1 monitoring. The Transducer  was inserted without difficulty . The patient tolerated the procedure well.  Complications None.     Left Ventricle  The visual ejection fraction is 60-65%. Left ventricular size is normal.     Right Ventricle  Global right ventricular function is normal. The right ventricle is normal  size.     Atria  The atrial septum is intact as assessed by color Doppler . S/p placement of  31mm Watchman device on 8/8/24. An ASD closure device was seen in the expected  anatomic position.The device was well seated and without residual shunt by  color Doppler mapping. A pacemaker lead is noted in the right atrium.     Mitral Valve  Moderate mitral insufficiency is present.     Aortic Valve  The aortic valve is tricuspid. No AI.     Tricuspid Valve  Moderate tricuspid insufficiency is present. Right ventricular systolic  pressure is 19mmHg above the right atrial pressure.     Pulmonic Valve  Trace pulmonic insufficiency is present.     Vessels  Ascending aorta 2.9 cm.     Pericardium  No pericardial effusion is present.     Compared to Previous Study  No prior MICHELL for comparison.    TTE dated  8/29/24:  ______________________________________________________________________________  Interpretation Summary  Left ventricular size, wall motion and function are normal. The ejection  fraction is 60-65%.  Right ventricular function, chamber size, wall motion, and thickness are  normal.  Mild to moderate mitral insufficiency is present.  Mild to moderate tricuspid insufficiency is present.  Dilation of the inferior vena cava is present with abnormal respiratory  variation in diameter. No pericardial effusion is present.     No significant changes noted.  ______________________________________________________________________________  Left Ventricle  Left ventricular size, wall motion and function are normal. The ejection  fraction is 60-65%. Thickening of the anterobasal septum is present. No  regional wall motion abnormalities are seen.     Right Ventricle  Right ventricular function, chamber size, wall motion, and thickness are  normal. A pacemaker lead is noted in the right ventricle.     Atria  The atria cannot be assessed.     Mitral Valve  Mild mitral annular calcification is present. Mild to moderate mitral  insufficiency is present.     Aortic Valve  The aortic valve cannot be assessed.     Tricuspid Valve  Mild to moderate tricuspid insufficiency is present. The right ventricular  systolic pressure is approximated at 27.4 mmHg plus the right atrial pressure.     Pulmonic Valve  The pulmonic valve is normal. Trace pulmonic insufficiency is present.     Vessels  The aorta root is normal. Dilation of the inferior vena cava is present with  abnormal respiratory variation in diameter. IVC diameter >2.1 cm collapsing  <50% with sniff suggests a high RA pressure estimated at 15 mmHg or greater.     Pericardium  No pericardial effusion is present.     Compared to Previous Study  No significant changes noted.  ______________________________________________________________________________  Doppler Measurements &  Calculations  TR max tawanna: 261.8 cm/sec  TR max P.4 mmHg        CLINICAL IMPRESSION:     Tessa Mansfield is a very pleasant 89 year old female who presents for 6 week Watchman follow-up.    Hospital Course by Diagnosis:  # Paroxysmal atrial fibrillation  # intolerance to AC due to GIB s/p Watchman (24)  # pericardial effusion s/p pericardiocentesis   Patient with a CHADsVASC 5 and HASBLED of 5. She has had difficulty tolerating eliquis in the past due to recurrent GIB in the setting of AVMs.  S/p 31mm WATCHMAN FLX device placement on 2024  Watchman procedure complicated by new pericardial effusion without tamponade. S/p pericardiocentesis  with 220 ml off. Patient admitted for post procedural monitoring with pericardial drain in place. Trial of drain clamp overnight  and 8/10 unsuccessful. No new drain output documented and patient with slightly larger effusion on  echo. Return to cath lab  for drain manipulation and 100 ml yellow drainage. Clamp  AM without reaccumulation, drain removed  PM. Today doing well. Fully recovered, MICHELL shows well seated watchman without leak or thrombus. No effusion on follow-up MICHELL.   - Continue Plavix 75 mg daily  - Restart ASA 81 mg daily   - Continue metoprolol tartrate 50 mg BID   - SBE prophylaxis x 6 months post LAAO   - Follow-up with me in 6 months     # CAD s/p multiple PCIs  # HLD  Extensive CAD history with multiple PCIs in the past. Most recently patient had a PCI to the mLAD on 23.  - Plavix as above, likely lifelong   - Continue Imdur to 90 daily  - Continue rosuvastatin 20 mg daily      # HTN  # Chronic heart failure with preserved ejection fraction  # Moderate MR  Dilated IVC on 8/10 echo. Noted to have moderate MR on recent MICHELL. Likely due to elevated BP at the time.   - Volume status: euvolemic continue PO Lasix 40 mg daily  - Continue amlodipine, may need to increase if BP remains elevated      #SSS s/p dual chamber  PPM  Last device check 5/28/24 with 6.6% AF burden. AP 27.5%,  0.1%  - OP EP follow up    # SHAJI on CKD IV, improving  Baseline Cr 1.7-2.0. Cr 1.71 on admission.  - Creat improved to 1.6, GFR 30     # Acute blood loss anemia - stable  # Anemia of chronic disease  # History of GIB 2/2 AVMs  Baseline Hgb ~7.5-9.0. Presented with Hgb 8.8, given 1 U pRBC during the case.   - Hgb at discharge 8.3  - Stable Hgb 9.7     RTC: 6 months with me       JI Loyola, CNP  [facility]       CC  Patient Care Team:  Pedro Gomez MD as PCP - General (Family Practice)  Adriana Lilly MD as MD (Urology)  Lilly Ritchie, RN as Nurse Coordinator  Tyrell Santoyo MD as MD (Cardiology)  Henny Larry MD as MD (Otolaryngology)  Pedro Gomez MD as Assigned PCP  Gladys Ratliff, RN as Specialty Care Coordinator (Nephrology)  Randa Ann NP as Assigned Heart and Vascular Provider  Felix Marquez MD as Assigned Neuroscience Provider  Chad Blair MD as MD (Critical Care)  Randa Ann NP as Nurse Practitioner (Cardiovascular Disease)  Barbara Dimas, RN as Specialty Care Coordinator (Pharmacy)  [facility]  Laura Llanes PA-C as Assigned Nephrology Provider      Please do not hesitate to contact me if you have any questions/concerns.     Sincerely,     Randa Ann NP

## 2024-09-23 NOTE — PATIENT INSTRUCTIONS
You were seen today in the Cardiovascular Clinic at the [facility].      Cardiology Providers you saw during your visit:  Randa Ann NP    Recommendations:    MICHELL as scheduled   Start aspirin 81 mg daily  Continue Plavix 75 mg daily x 6 months   Labs with Dr. Gomez in 2 weeks   Call if you need dental appt within the first 6 months - you will need antibiotics  Follow-up video visit with me in 4 months.     Nursing questions:  Masha MEDINA;  aDealio messages are great! Otherwise [phone] if you don't hear back within 24 hrs please call back.   For emergencies call 911.      Thank you for your visit today!     Masha Johnson RN  Lead Structural Heart Coordinator  TAVR, MitraClip and Watchman         Pre-procedure instructions - Echocardiogram MICHELL   9/26  Patient Education    Your arrival time is 10:15.  Location is [address] Waiting Room  [facility]:   arrive 15 minutes prior to examination time.    How do I prepare for my exam?  No food or drink: 8 hours before the test  If you take antacids or water pills (diuretics): Do not take them until after your test. You may take blood pressure medicine with a few sips of water.  If you have diabetes:  -   Morning slots preferred  -   If you take insulin, call your diabetes care team. Ask if you should take a   dose the morning of your test.  -   If you take diabetes medicine by mouth, don't take it on the morning of your test. Bring it with you to take after the test. (If you have questions, call your diabetes care team.)  Bring a list of any medicines you are taking.     What Should I wear? Please bring or wear a comfortable two-piece outfit.     How long does the Exam Take? Most tests take up to 90 minutes.        Do I need a ? YES  Plan to have a responsible adult take you home after the test. After the exam you may not drive, take a bus or taxi by  yourself.  -   Someone should stay with you for 6 hours after your test.     What Should I do after the Exam? Do not drive for 24 hours after the test.     What is this test Transesophageal echocardiography (MICHELL) is a test that produces pictures of your heart. MICHELL uses high-frequency sound waves (ultrasound) to make detailed pictures of your heart and the arteries that lead to and from it. Unlike a standard echocardiogram, the echo transducer that produces the sound waves for MICHELL is attached to a thin tube that passes through your mouth, down your throat and into your esophagus. Because the esophagus is so close to the upper chambers of the heart, very clear images of those heart structures and valves can be obtained.

## 2024-09-23 NOTE — NURSING NOTE
Chief Complaint   Patient presents with    Follow Up     Return LAAO- 45 day S/P LAAO     Vitals were taken and medications reconciled.    Tyron Krishna, EMT  2:05 PM

## 2024-09-23 NOTE — PROGRESS NOTES
[facility] HEART CARE  CARDIOVASCULAR DIVISION    STRUCTURAL CLINIC RETURN VISIT    PRIMARY CARDIOLOGIST: Dr. Santoyo      PERTINENT CLINICAL HISTORY:     Tessa Mansfield is a very pleasant 89 year old female who presents for 45 day Watchman follow-up. 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 She underwent successful left atrial appendage closure with a 31mm WATCHMAN FLX device on August 8, 2024. Her intra procedural MICHELL demonstrated a new pericardial effusion without tamponade physiology. Patient underwent pericardiocentesis on 8/8 w/ 220 mL and drain placement. Repeat pericardioscentesis 8/11 with additional 100 mL fluid removed, drain removed 8/13 at which time echo showed trace effusion. She was discharged on Plavix monotherapy. Repeat TTE 8/29/24 showed trivial effusion.     She reports feeling well since the procedure. She is fully recovered after her pericardiocentesis. She denies any residual chest pain or chest pressure. She denies SOB with regular activities, but has SOB with overexertion. This is unchanged. No orthopnea, PND, leg swelling, weight gain. No palpitations, lightheadedness or syncope. Puncture site from pericardiocentesis and groin are fully healed. She is on Plavix monotherapy. No GI bleeding.      PAST MEDICAL HISTORY:   # intolerance to AC due to GIB s/p Watchman (8/4/24)  # pericardial effusion s/p pericardiocentesis 8/8, 8/11  # CAD s/p multiple PCIs  # HLD  # HTN  # Chronic diastolic heart failure  # SSS s/p dual chamber PPM  # Concern for infection  # Leukocytosis  # SHAJI on CKD IV  # Elevated LFT's  # Acute blood loss anemia, stable  # Anemia of chronic disease  # History of GIB 2/2 AVM's  # Left groin hematoma  # Discharge planning       PAST SURGICAL HISTORY:     Past Surgical History:   Procedure Laterality Date    CHOLECYSTECTOMY       CV CORONARY ANGIOGRAM N/A 6/17/2022    Procedure: Coronary Angiogram;  Surgeon: Constantine Macias MD;  Location: [facility] CARDIAC CATH LAB    CV CORONARY ANGIOGRAM N/A 7/17/2023    Procedure: Coronary Angiogram;  Surgeon: Constantine Macias MD;  Location: [facility] CARDIAC CATH LAB    CV LEFT ATRIAL APPENDAGE CLOSURE N/A 8/8/2024    Procedure: Left Atrial Appendage Closure;  Surgeon: Rodrick Garcia MD;  Location: [facility] CARDIAC CATH LAB    CV PCI N/A 6/17/2022    Procedure: Percutaneous Coronary Intervention;  Surgeon: Constantine Macias MD;  Location: [facility] CARDIAC CATH LAB    CV PCI N/A 7/17/2023    Procedure: Percutaneous Coronary Intervention;  Surgeon: Constantine Macias MD;  Location: [facility] CARDIAC CATH LAB    CV PCI N/A 11/6/2023    Procedure: Percutaneous Coronary Intervention;  Surgeon: Constantine Macias MD;  Location: [facility] CARDIAC CATH LAB    CV PERICARDIOCENTESIS N/A 8/8/2024    Procedure: Pericardiocentesis;  Surgeon: Rodrick Garcia MD;  Location: [facility] CARDIAC CATH LAB    CV PERICARDIOCENTESIS N/A 8/11/2024    Procedure: Pericardial Drain Adjustment, Possible New Pericardial Drain Placement;  Surgeon: Rodrick Garcia MD;  Location: [facility] CARDIAC CATH LAB    CV RIGHT HEART CATH MEASUREMENTS RECORDED N/A 6/17/2022    Procedure: Right Heart Catheterization;  Surgeon: Constantine Macias MD;  Location: [facility] CARDIAC CATH LAB    EP PACEMAKER N/A 10/15/2019    Procedure: EP PACEMAKER;  Surgeon: Anthony Zhu MD;  Location: [facility] CARDIAC CATH LAB    ESOPHAGOSCOPY, GASTROSCOPY, DUODENOSCOPY (EGD), COMBINED N/A 7/20/2023    Procedure: Esophagoscopy, gastroscopy, duodenoscopy (EGD), combined;  Surgeon: Bekah Dash DO;  Location:  GI    ESOPHAGOSCOPY, GASTROSCOPY, DUODENOSCOPY (EGD), COMBINED N/A 5/2/2024    Procedure: Esophagoscopy, gastroscopy, duodenoscopy (EGD), combined;  Surgeon: Tavo Donaldson MD;  Location: [facility]  PPM INSERTION NEW/REPLACEMENT W/ ATRIAL&VENTRICULAR LEAD  11-    HYSTERECTOMY      LAPAROTOMY EXPLORATORY N/A 4/13/2024    Procedure: Laparotomy exploratory, Wound Vac Placement.;  Surgeon: Anthony Trammell MD;  Location: [facility] OR    LAPAROTOMY EXPLORATORY N/A 4/14/2024    Procedure: Abdominal Re-Exploration and Closure;  Surgeon: Anthony Trammell MD;  Location: [facility] OR    LAPAROTOMY EXPLORATORY N/A 4/13/2024    Procedure: Exploratory Laparotomy;  Surgeon: Anthony Trammell MD;  Location: [facility] OR        CURRENT MEDICATIONS:     Current Outpatient Medications   Medication Sig Dispense Refill    acetaminophen (TYLENOL) 325 MG tablet Take 975 mg by mouth nightly as needed for pain      allopurinol (ZYLOPRIM) 100 MG tablet Take 1 tablet (100 mg) by mouth daily 90 tablet 0    amLODIPine (NORVASC) 5 MG tablet Take 1 tablet (5 mg) by mouth daily. 90 tablet 3    clopidogrel (PLAVIX) 75 MG tablet Take 1 tablet (75 mg) by mouth daily 90 tablet 1    fluticasone-salmeterol (ADVAIR) 250-50 MCG/ACT inhaler INHALE 1 DOSE BY MOUTH TWICE DAILY 60 each 8    furosemide (LASIX) 40 MG tablet Take 1 tablet (40 mg) by mouth daily May take an additional 20 mg at noon as needed for swelling. 90 tablet 2    isosorbide mononitrate (IMDUR) 30 MG 24 hr tablet Take 3 tablets (90 mg) by mouth daily 90 tablet 11    methocarbamol (ROBAXIN) 500 MG tablet Take 1 tablet (500 mg) by mouth every 6 hours as needed for muscle spasms 30 tablet 0    metoprolol tartrate (LOPRESSOR) 25 MG tablet Take 2 tablets (50 mg) by mouth 2 times daily 180 tablet 3    Multiple Vitamins-Minerals (PRESERVISION AREDS 2+MULTI VIT PO) Take 1 capsule by mouth 2 times daily      omeprazole (PRILOSEC) 40 MG DR capsule Take 1 capsule (40 mg) by mouth daily. 90 capsule 3    potassium chloride ER (K-TAB) 20 MEQ CR tablet Take 1 tablet (20 mEq) by mouth daily 90 tablet 3    rosuvastatin (CRESTOR) 20 MG tablet Take 1 tablet (20 mg) by mouth daily. 90 tablet 1    sodium  "bicarbonate 650 MG tablet Take 1 tablet (650 mg) by mouth 2 times daily 60 tablet 11    timolol maleate (TIMOPTIC) 0.5 % ophthalmic solution INSTILL 1 DROP INTO EACH EYE ONCE DAILY IN THE MORNING      vitamin B-12 (CYANOCOBALAMIN) 500 MCG tablet Take 1 tablet by mouth daily      vitamin D3 25 mcg (1000 units) tablet Take 1 tablet (25 mcg) by mouth every other day 90 tablet 3        ALLERGIES:     Allergies   Allergen Reactions    Codeine Sulfate Itching    Shrimp Swelling    Oxycodone Other (See Comments)    Shrimp Extract Swelling    Bactrim [Sulfamethoxazole W/Trimethoprim]      Patient unable to recall    Biaxin [Clarithromycin]     Chlorthalidone Nausea and Vomiting    Clonidine     Darvon [Propoxyphene Hcl]     Dilaudid [Hydromorphone] Visual Disturbance and Hallucination     Tolerated in April 2024 hospital admissions    Gabapentin Fatigue and Confusion     \"felt drunk\"    Indomethacin     Levaquin [Levofloxacin Hemihydrate]     Morphine Sulfate     Percocet [Oxycodone-Acetaminophen] Hallucination    Pregabalin Itching and Fatigue    Simvastatin Palpitations     Muscle weakness, leg cramping      Spironolactone      Dehydrated      Terazosin         FAMILY HISTORY:     Family History   Problem Relation Age of Onset    Cancer Sister 82        bladder cancer        SOCIAL HISTORY:     Social History     Socioeconomic History    Marital status:     Number of children: 4   Occupational History     Employer: [employer]   Tobacco Use    Smoking status: Former     Current packs/day: 0.30     Average packs/day: 0.3 packs/day for 15.0 years (4.5 ttl pk-yrs)     Types: Cigarettes    Smokeless tobacco: Never    Tobacco comments:     Quit 35+ years ago   Vaping Use    Vaping status: Never Used   Substance and Sexual Activity    Drug use: No    Sexual activity: Not Currently     Partners: Male     Birth control/protection: Post-menopausal   Other Topics Concern    Blood Transfusions Yes    Exercise No "     Social Determinants of Health     Financial Resource Strain: Low Risk  (11/15/2023)    Financial Resource Strain     Within the past 12 months, have you or your family members you live with been unable to get utilities (heat, electricity) when it was really needed?: No   Food Insecurity: Low Risk  (11/15/2023)    Food Insecurity     Within the past 12 months, did you worry that your food would run out before you got money to buy more?: No     Within the past 12 months, did the food you bought just not last and you didn t have money to get more?: No   Transportation Needs: Low Risk  (11/15/2023)    Transportation Needs     Within the past 12 months, has lack of transportation kept you from medical appointments, getting your medicines, non-medical meetings or appointments, work, or from getting things that you need?: No   Interpersonal Safety: Low Risk  (11/15/2023)    Interpersonal Safety     Do you feel physically and emotionally safe where you currently live?: Yes     Within the past 12 months, have you been hit, slapped, kicked or otherwise physically hurt by someone?: No     Within the past 12 months, have you been humiliated or emotionally abused in other ways by your partner or ex-partner?: No   Housing Stability: Low Risk  (11/15/2023)    Housing Stability     Do you have housing? : Yes     Are you worried about losing your housing?: No        REVIEW OF SYSTEMS:     Constitutional: No fevers or chills  Skin: No new rash or itching  Eyes: No acute change in vision  Ears/Nose/Throat: No purulent rhinorrhea, new hearing loss, or new vertigo  Respiratory: No cough or hemoptysis  Cardiovascular: See HPI  Gastrointestinal: No change in appetite, vomiting, hematemesis or diarrhea  Genitourinary: No dysuria or hematuria  Musculoskeletal: No new back pain, neck pain or muscle pain  Neurologic: No new headaches, focal weakness or behavior changes  Psychiatric: No hallucinations, excessive alcohol consumption or  illegal drug usage  Hematologic/Lymphatic/Immunologic: No bleeding, chills, fever, night sweats or weight loss  Endocrine: No new cold intolerance, heat intolerance, polyphagia, polydipsia or polyuria      PHYSICAL EXAMINATION:     BP (!) 160/81 (BP Location: Left arm, Patient Position: Chair, Cuff Size: Adult Regular)   Pulse 71   Wt 64.4 kg (141 lb 14.4 oz)   SpO2 98%   BMI 22.91 kg/m      GENERAL: No acute distress.  HEENT: EOMI. Sclerae white, not injected. Nares clear. Pharynx without erythema or exudate.   Neck: No adenopathy. No thyromegaly. No jugular venous distension.   Heart: Regular rate and rhythm. No murmur.   Lungs: Clear to auscultation. No ronchi, wheezes, rales.   Abdomen: Soft, nontender, nondistended. Bowel sounds present.  Extremities: No clubbing, cyanosis, or edema.   Neurologic: Alert and oriented to person/place/time, normal speech and affect. No focal deficits.  Skin: No petechiae, purpura or rash.     LABORATORY DATA:     LIPID RESULTS:  Lab Results   Component Value Date    CHOL 105 11/20/2023    CHOL 153 08/26/2020    HDL 44 (L) 11/20/2023    HDL 52 08/26/2020    LDL 36 11/20/2023    LDL 71 08/26/2020    TRIG 124 11/20/2023    TRIG 149 08/26/2020    CHOLHDLRATIO 2.8 12/30/2014       LIVER ENZYME RESULTS:  Lab Results   Component Value Date    AST 63 (H) 08/15/2024    AST 15 06/25/2018    ALT 50 08/15/2024    ALT 14 06/25/2018       CBC RESULTS:  Lab Results   Component Value Date    WBC 7.5 08/29/2024    WBC 5.4 08/26/2020    RBC 3.31 (L) 08/29/2024    RBC 3.58 (L) 08/26/2020    HGB 9.7 (L) 08/29/2024    HGB 11.2 (L) 08/26/2020    HCT 32.0 (L) 08/29/2024    HCT 36.0 08/26/2020    MCV 97 08/29/2024     (H) 08/26/2020    MCH 29.3 08/29/2024    MCH 31.3 08/26/2020    MCHC 30.3 (L) 08/29/2024    MCHC 31.1 (L) 08/26/2020    RDW 17.9 (H) 08/29/2024    RDW 14.9 08/26/2020     (L) 08/29/2024     (L) 08/26/2020       BMP RESULTS:  Lab Results   Component Value Date    NA  "142 08/29/2024     08/26/2020    POTASSIUM 4.1 08/29/2024    POTASSIUM 3.5 08/08/2024    POTASSIUM 4.8 11/21/2022    POTASSIUM 4.4 08/26/2020    CHLORIDE 105 08/29/2024    CHLORIDE 110 (H) 11/21/2022    CHLORIDE 111 (H) 08/26/2020    CO2 23 08/29/2024    CO2 24 11/21/2022    CO2 24 08/26/2020    ANIONGAP 14 08/29/2024    ANIONGAP 6 11/21/2022    ANIONGAP 8 08/26/2020    GLC 84 08/29/2024     (H) 08/09/2024    GLC 79 11/21/2022    GLC 84 08/26/2020    BUN 24.8 (H) 08/29/2024    BUN 35 (H) 11/21/2022    BUN 41 (H) 08/26/2020    CR 1.64 (H) 08/29/2024    CR 1.70 (H) 08/26/2020    GFRESTIMATED 30 (L) 08/29/2024    GFRESTIMATED 27 (L) 08/26/2020    GFRESTBLACK 32 (L) 08/26/2020    ALEXANDRO 8.7 (L) 08/29/2024    ALEXANDRO 9.0 08/26/2020        A1C RESULTS:  No results found for: \"A1C\"    INR RESULTS:  Lab Results   Component Value Date    INR 1.43 (H) 08/10/2024    INR 1.31 (H) 08/09/2024    INR 1.08 12/27/2013    INR 1.15 (H) 08/06/2010          PROCEDURES & FURTHER ASSESSMENTS:     MICHELL 10/1/24    Interpretation Summary  MICHELL ordered after placement of 31mm Watchman device on 8/8/24 for interval  evaluation.     The left atrial occlusion device is well seated. There is no caridad-device color  flow on Doppler interrogation. There is no device-associated thrombus.     The atrial septum is intact as assessed by color Doppler.     Moderate mitral insufficiency was present (likely secondary to hypertension,  systolic BP was 180 mmHg at the beginning of the exam). ERO area 0.22 cm^2,  MR  volume 46 mL.  ______________________________________________________________________________  Procedure  Transesophageal Echocardiogram with color and spectral Doppler performed.  Procedure location Echo Lab. The procedure was performed in the Echo Lab.  Informed consent for Transesophegeal echo obtained. MICHELL Probe #67 was used  during the procedure. Patient was sedated using Fentanyl 25 mcg. Patient was  sedated using Versed 3 mg. The heart " rate, respiratory rate, oxygen  saturations, blood pressure, and response to care were monitored throughout  the procedure with the assistance of the nurse. I determined this patient to  be an appropriate candidate for the planned sedation and procedure and have  reassessed the patient immediately prior to sedation and procedure. Total  sedation time: 37 minutes of continuous bedside 1:1 monitoring. The Transducer  was inserted without difficulty . The patient tolerated the procedure well.  Complications None.     Left Ventricle  The visual ejection fraction is 60-65%. Left ventricular size is normal.     Right Ventricle  Global right ventricular function is normal. The right ventricle is normal  size.     Atria  The atrial septum is intact as assessed by color Doppler . S/p placement of  31mm Watchman device on 8/8/24. An ASD closure device was seen in the expected  anatomic position.The device was well seated and without residual shunt by  color Doppler mapping. A pacemaker lead is noted in the right atrium.     Mitral Valve  Moderate mitral insufficiency is present.     Aortic Valve  The aortic valve is tricuspid. No AI.     Tricuspid Valve  Moderate tricuspid insufficiency is present. Right ventricular systolic  pressure is 19mmHg above the right atrial pressure.     Pulmonic Valve  Trace pulmonic insufficiency is present.     Vessels  Ascending aorta 2.9 cm.     Pericardium  No pericardial effusion is present.     Compared to Previous Study  No prior MICHELL for comparison.    TTE dated 8/29/24:  ______________________________________________________________________________  Interpretation Summary  Left ventricular size, wall motion and function are normal. The ejection  fraction is 60-65%.  Right ventricular function, chamber size, wall motion, and thickness are  normal.  Mild to moderate mitral insufficiency is present.  Mild to moderate tricuspid insufficiency is present.  Dilation of the inferior vena cava is  present with abnormal respiratory  variation in diameter. No pericardial effusion is present.     No significant changes noted.  ______________________________________________________________________________  Left Ventricle  Left ventricular size, wall motion and function are normal. The ejection  fraction is 60-65%. Thickening of the anterobasal septum is present. No  regional wall motion abnormalities are seen.     Right Ventricle  Right ventricular function, chamber size, wall motion, and thickness are  normal. A pacemaker lead is noted in the right ventricle.     Atria  The atria cannot be assessed.     Mitral Valve  Mild mitral annular calcification is present. Mild to moderate mitral  insufficiency is present.     Aortic Valve  The aortic valve cannot be assessed.     Tricuspid Valve  Mild to moderate tricuspid insufficiency is present. The right ventricular  systolic pressure is approximated at 27.4 mmHg plus the right atrial pressure.     Pulmonic Valve  The pulmonic valve is normal. Trace pulmonic insufficiency is present.     Vessels  The aorta root is normal. Dilation of the inferior vena cava is present with  abnormal respiratory variation in diameter. IVC diameter >2.1 cm collapsing  <50% with sniff suggests a high RA pressure estimated at 15 mmHg or greater.     Pericardium  No pericardial effusion is present.     Compared to Previous Study  No significant changes noted.  ______________________________________________________________________________  Doppler Measurements & Calculations  TR max tawanna: 261.8 cm/sec  TR max P.4 mmHg        CLINICAL IMPRESSION:     Tessa Mansfield is a very pleasant 89 year old female who presents for 6 week Watchman follow-up.    Hospital Course by Diagnosis:  # Paroxysmal atrial fibrillation  # intolerance to AC due to GIB s/p Watchman (24)  # pericardial effusion s/p pericardiocentesis   Patient with a CHADsVASC 5 and HASBLED of 5. She has had difficulty  tolerating eliquis in the past due to recurrent GIB in the setting of AVMs.  S/p 31mm WATCHMAN FLX device placement on August 8, 2024  Watchman procedure complicated by new pericardial effusion without tamponade. S/p pericardiocentesis 8/8 with 220 ml off. Patient admitted for post procedural monitoring with pericardial drain in place. Trial of drain clamp overnight 8/9 and 8/10 unsuccessful. No new drain output documented and patient with slightly larger effusion on 8/11 echo. Return to cath lab 8/11 for drain manipulation and 100 ml yellow drainage. Clamp 8/13 AM without reaccumulation, drain removed 8/13 PM. Today doing well. Fully recovered, MICHELL shows well seated watchman without leak or thrombus. No effusion on follow-up MICHELL.   - Continue Plavix 75 mg daily  - Restart ASA 81 mg daily   - Continue metoprolol tartrate 50 mg BID   - SBE prophylaxis x 6 months post LAAO   - Follow-up with me in 6 months     # CAD s/p multiple PCIs  # HLD  Extensive CAD history with multiple PCIs in the past. Most recently patient had a PCI to the mLAD on 11/26/23.  - Plavix as above, likely lifelong   - Continue Imdur to 90 daily  - Continue rosuvastatin 20 mg daily      # HTN  # Chronic heart failure with preserved ejection fraction  # Moderate MR  Dilated IVC on 8/10 echo. Noted to have moderate MR on recent MICHELL. Likely due to elevated BP at the time.   - Volume status: euvolemic continue PO Lasix 40 mg daily  - Continue amlodipine, may need to increase if BP remains elevated      #SSS s/p dual chamber PPM  Last device check 5/28/24 with 6.6% AF burden. AP 27.5%,  0.1%  - OP EP follow up    # SHAJI on CKD IV, improving  Baseline Cr 1.7-2.0. Cr 1.71 on admission.  - Creat improved to 1.6, GFR 30     # Acute blood loss anemia - stable  # Anemia of chronic disease  # History of GIB 2/2 AVMs  Baseline Hgb ~7.5-9.0. Presented with Hgb 8.8, given 1 U pRBC during the case.   - Hgb at discharge 8.3  - Stable Hgb 9.7     RTC: 6 months  with me       JI Loyola, CNP  [facility]       CC  Patient Care Team:  Pedro Gomez MD as PCP - General (Family Practice)  Adriana Lilly MD as MD (Urology)  Lilly Ritchie, RN as Nurse Coordinator  Tyrell Santoyo MD as MD (Cardiology)  Henny Larry MD as MD (Otolaryngology)  Pedro Gomez MD as Assigned PCP  Gladys Ratliff, RN as Specialty Care Coordinator (Nephrology)  Randa Ann NP as Assigned Heart and Vascular Provider  Felix Marquez MD as Assigned Neuroscience Provider  Chad Blair MD as MD (Critical Care)  Randa Ann NP as Nurse Practitioner (Cardiovascular Disease)  Barbara Dimas, RN as Specialty Care Coordinator (Pharmacy)  [facility]  Laura Llanes PA-C as Assigned Nephrology Provider

## 2024-09-23 NOTE — TELEPHONE ENCOUNTER
[facility] Call Center    Phone Message    May a detailed message be left on voicemail: yes     Reason for Call: Other: order number [id number] needs to be refaxed to [phone] as she was missing page 5 of 7.

## 2024-09-24 ENCOUNTER — TELEPHONE (OUTPATIENT)
Dept: CARDIOLOGY | Facility: CLINIC | Age: 89
End: 2024-09-24
Payer: COMMERCIAL

## 2024-09-24 ENCOUNTER — MEDICAL CORRESPONDENCE (OUTPATIENT)
Dept: HEALTH INFORMATION MANAGEMENT | Facility: CLINIC | Age: 89
End: 2024-09-24
Payer: COMMERCIAL

## 2024-09-24 NOTE — TELEPHONE ENCOUNTER
[facility] Call [facility]    Phone Message    May a detailed message be left on voicemail: yes     Reason for Call: Other: Patient was returning a missed call from Kari, but we weren't able to get a hold of her. Patient would like a call back to get the instructions for the MICHELL, if that is necessary. Thank you!     Action Taken: Other: Cardiology    Travel Screening: Not Applicable     Thank you!  [facility]

## 2024-09-24 NOTE — TELEPHONE ENCOUNTER
"I was able to reach the pt and reviewed pre-procedure instruction with patient.  All questions and concerns addressed and answered.  Kari Mccoy RN on 9/24/2024 at 10:12 AM    I called pt to review Transesophageal Echocardiogram (MICHELL) Instructions but was unable to reach her.  I left a Voicemail requesting a call back.  Kari Mccoy RN on 9/24/2024 at 9:36 AM    ----- Message from Masha MACIAS sent at 9/24/2024  7:16 AM CDT -----  Regarding: MICHELL  Patient is having a MICHELL on 10/1 (normal \"45\" post watchman implant) Please call at some point this week to review instructions. Please ask her to hold her lasix dose the morning of the echo. Her family sets up her meds and she has a home health nurse, but she is 100% cognitive there.  Masha          Pre-procedure instructions - Echocardiogram MICHELL  Patient Education    Your arrival time is 1015.  Location is [address] Waiting Room  [facility]:   arrive 15 minutes prior to examination time.    How do I prepare for my exam?  No food or drink: 8 hours before the test  If you take antacids or water pills (diuretics): Do not take them until after your test. You may take blood pressure medicine with a few sips of water.  If you have diabetes:  -   Morning slots preferred  -   If you take insulin, call your diabetes care team. Ask if you should take a   dose the morning of your test.  -   If you take diabetes medicine by mouth, don't take it on the morning of your test. Bring it with you to take after the test. (If you have questions, call your diabetes care team.)  Bring a list of any medicines you are taking.     What Should I wear? Please bring or wear a comfortable two-piece outfit.     How long does the Exam Take? Most tests take up to 90 minutes.        Do I need a ? YES  Plan to have a responsible adult take you home after the test. After the exam you may not drive, take a bus or " taxi by yourself.  -   Someone should stay with you for 6 hours after your test.     What Should I do after the Exam? Do not drive for 24 hours after the test.     What is this test Transesophageal echocardiography (MICHELL) is a test that produces pictures of your heart. MICHELL uses high-frequency sound waves (ultrasound) to make detailed pictures of your heart and the arteries that lead to and from it. Unlike a standard echocardiogram, the echo transducer that produces the sound waves for MICHELL is attached to a thin tube that passes through your mouth, down your throat and into your esophagus. Because the esophagus is so close to the upper chambers of the heart, very clear images of those heart structures and valves can be obtained.

## 2024-09-25 NOTE — TELEPHONE ENCOUNTER
omeprazole (PRILOSEC) 40 MG DR capsule       Routing refill request to provider for review/approval because:  Please review refill note from 9/10/24 and other telephone encounters and resolve this refill. Is this refill authorized by provider ? Do not route back to [facility] Med Refill Team.     Originally was sent on 9/10 as refill, then showed interaction when it once again came through as refill request and was sent to provider to address.

## 2024-09-25 NOTE — TELEPHONE ENCOUNTER
Located order that had two different dates, Had Dr Gomez sign new order and faxed it back to [facility] today 9/25

## 2024-09-26 RX ORDER — OMEPRAZOLE 40 MG/1
40 CAPSULE, DELAYED RELEASE ORAL DAILY
Qty: 90 CAPSULE | Refills: 3 | OUTPATIENT
Start: 2024-09-26

## 2024-09-27 NOTE — PROGRESS NOTES
Labs are scheduled for 10/25/24.     Barbara Dimas RN   Anemia Services  [facility]  [email or web address]   Office : [phone]  Fax: [phone]

## 2024-10-01 ENCOUNTER — HOSPITAL ENCOUNTER (OUTPATIENT)
Dept: CARDIOLOGY | Facility: CLINIC | Age: 89
Discharge: HOME | End: 2024-10-01
Attending: INTERNAL MEDICINE | Admitting: INTERNAL MEDICINE
Payer: COMMERCIAL

## 2024-10-01 VITALS
HEART RATE: 74 BPM | RESPIRATION RATE: 16 BRPM | OXYGEN SATURATION: 98 % | DIASTOLIC BLOOD PRESSURE: 73 MMHG | SYSTOLIC BLOOD PRESSURE: 150 MMHG

## 2024-10-01 DIAGNOSIS — K92.2 GASTROINTESTINAL HEMORRHAGE, UNSPECIFIED GASTROINTESTINAL HEMORRHAGE TYPE: ICD-10-CM

## 2024-10-01 DIAGNOSIS — I48.91 ATRIAL FIBRILLATION (H): ICD-10-CM

## 2024-10-01 LAB — LVEF ECHO: NORMAL

## 2024-10-01 PROCEDURE — 93325 DOPPLER ECHO COLOR FLOW MAPG: CPT

## 2024-10-01 PROCEDURE — 93320 DOPPLER ECHO COMPLETE: CPT | Mod: 26 | Performed by: STUDENT IN AN ORGANIZED HEALTH CARE EDUCATION/TRAINING PROGRAM

## 2024-10-01 PROCEDURE — 93312 ECHO TRANSESOPHAGEAL: CPT | Mod: 26 | Performed by: STUDENT IN AN ORGANIZED HEALTH CARE EDUCATION/TRAINING PROGRAM

## 2024-10-01 PROCEDURE — 93325 DOPPLER ECHO COLOR FLOW MAPG: CPT | Mod: 26 | Performed by: STUDENT IN AN ORGANIZED HEALTH CARE EDUCATION/TRAINING PROGRAM

## 2024-10-01 PROCEDURE — 99152 MOD SED SAME PHYS/QHP 5/>YRS: CPT | Mod: GC | Performed by: STUDENT IN AN ORGANIZED HEALTH CARE EDUCATION/TRAINING PROGRAM

## 2024-10-01 PROCEDURE — 250N000011 HC RX IP 250 OP 636: Performed by: STUDENT IN AN ORGANIZED HEALTH CARE EDUCATION/TRAINING PROGRAM

## 2024-10-01 PROCEDURE — 250N000009 HC RX 250: Performed by: STUDENT IN AN ORGANIZED HEALTH CARE EDUCATION/TRAINING PROGRAM

## 2024-10-01 PROCEDURE — 258N000003 HC RX IP 258 OP 636: Performed by: STUDENT IN AN ORGANIZED HEALTH CARE EDUCATION/TRAINING PROGRAM

## 2024-10-01 RX ORDER — FLUMAZENIL 0.1 MG/ML
0.2 INJECTION, SOLUTION INTRAVENOUS
Status: DISCONTINUED | OUTPATIENT
Start: 2024-10-01 | End: 2024-10-02 | Stop reason: HOSPADM

## 2024-10-01 RX ORDER — SODIUM CHLORIDE 9 MG/ML
1000 INJECTION, SOLUTION INTRAVENOUS CONTINUOUS
Status: DISCONTINUED | OUTPATIENT
Start: 2024-10-01 | End: 2024-10-02 | Stop reason: HOSPADM

## 2024-10-01 RX ORDER — FENTANYL CITRATE 50 UG/ML
25 INJECTION, SOLUTION INTRAMUSCULAR; INTRAVENOUS
Status: DISCONTINUED | OUTPATIENT
Start: 2024-10-01 | End: 2024-10-02 | Stop reason: HOSPADM

## 2024-10-01 RX ORDER — NALOXONE HYDROCHLORIDE 0.4 MG/ML
0.2 INJECTION, SOLUTION INTRAMUSCULAR; INTRAVENOUS; SUBCUTANEOUS
Status: DISCONTINUED | OUTPATIENT
Start: 2024-10-01 | End: 2024-10-02 | Stop reason: HOSPADM

## 2024-10-01 RX ORDER — NALOXONE HYDROCHLORIDE 0.4 MG/ML
0.4 INJECTION, SOLUTION INTRAMUSCULAR; INTRAVENOUS; SUBCUTANEOUS
Status: DISCONTINUED | OUTPATIENT
Start: 2024-10-01 | End: 2024-10-02 | Stop reason: HOSPADM

## 2024-10-01 RX ORDER — ACYCLOVIR 200 MG/1
9.5 CAPSULE ORAL
Status: DISCONTINUED | OUTPATIENT
Start: 2024-10-01 | End: 2024-10-02 | Stop reason: HOSPADM

## 2024-10-01 RX ORDER — LIDOCAINE HYDROCHLORIDE 20 MG/ML
15 SOLUTION OROPHARYNGEAL ONCE
Status: COMPLETED | OUTPATIENT
Start: 2024-10-01 | End: 2024-10-01

## 2024-10-01 RX ADMIN — SODIUM CHLORIDE 500 ML: 9 INJECTION, SOLUTION INTRAVENOUS at 11:01

## 2024-10-01 RX ADMIN — MIDAZOLAM 1 MG: 1 INJECTION INTRAMUSCULAR; INTRAVENOUS at 11:08

## 2024-10-01 RX ADMIN — MIDAZOLAM 2 MG: 1 INJECTION INTRAMUSCULAR; INTRAVENOUS at 10:54

## 2024-10-01 RX ADMIN — TOPICAL ANESTHETIC 0.5 ML: 200 SPRAY DENTAL; PERIODONTAL at 10:49

## 2024-10-01 RX ADMIN — FENTANYL CITRATE 25 MCG: 50 INJECTION, SOLUTION INTRAMUSCULAR; INTRAVENOUS at 10:54

## 2024-10-01 RX ADMIN — LIDOCAINE HYDROCHLORIDE 15 ML: 20 SOLUTION ORAL at 10:49

## 2024-10-01 NOTE — PROGRESS NOTES
Pt arrived in ECHO department for scheduled MICHELL.   Procedure explained, questions answered and consent signed. Discharge instructions discussed with patient.  Pt's throat sprayed at 1050, therefore pt will not be able to eat or drink until 2 hours after at 1250. Informed pt of this time and encouraged to start with warm fluids and soft foods.    Pt tolerated procedure well, and was given a total of 25 mcg IV fentanyl and 3 mg IV versed for conscious sedation. Pt denied throat or chest pain after MICHELL complete.   MICHELL probe 67 used for procedure.  Pt denied chest or throat pain after procedure and was D/C home after awake and VSS. Escorted out to front lobby by staff in w/c to meet pt's ride home.

## 2024-10-01 NOTE — PRE-PROCEDURE
GENERAL PRE-PROCEDURE:   Procedure:  Transesophageal echocardiogram  Date/Time:  10/1/2024 8:20 AM    Written consent obtained?: Yes    Risks and benefits: Risks, benefits and alternatives were discussed    Consent given by:  Patient  Patient states understanding of procedure being performed: Yes    Patient's understanding of procedure matches consent: Yes    Procedure consent matches procedure scheduled: Yes    Expected level of sedation:  Moderate  Appropriately NPO:  Yes  ASA Class:  4  Mallampati  :  Grade 4- soft palate obscured by base of tongue  Lungs:  Lungs clear with good breath sounds bilaterally  Heart:  Normal heart sounds and rate  History & Physical reviewed:  History and physical reviewed and no updates needed  Statement of review:  I have reviewed the lab findings, diagnostic data, medications, and the plan for sedation

## 2024-10-02 ENCOUNTER — TELEPHONE (OUTPATIENT)
Dept: GASTROENTEROLOGY | Facility: CLINIC | Age: 89
End: 2024-10-02
Payer: COMMERCIAL

## 2024-10-02 ENCOUNTER — PATIENT OUTREACH (OUTPATIENT)
Dept: CARDIOLOGY | Facility: CLINIC | Age: 89
End: 2024-10-02
Payer: COMMERCIAL

## 2024-10-02 NOTE — CONFIDENTIAL NOTE
Per Randa Ann,  if pt home BP have been If elevated > 140, I would like to increase amlodipine to 10 mg daily.     RN call to patient who reports she did not take her BP today or yesterday, however recent values have been:  132/77  130/73  120/72  142/82    Pt takes morning meds at about 9 am. These Bps are anywhere from 11 am to 2pm    She elects to monitor and if she has several days >140 will call to clinic to notify    Provider updated    Rachel Dawn RN

## 2024-10-02 NOTE — TELEPHONE ENCOUNTER
Pre assessment completed for upcoming procedure.      Procedure details:    Patient scheduled for Upper endoscopy (EGD) on 10.16.24.     Arrival time: 0745. Procedure time 0845    Facility location: [facility]; [address] [place] [address], [address]rd Floor, [place] [address]. Check in location: Main entrance at registration desk.    Sedation type: Conscious sedation     Pre op exam needed? No.    Indication for procedure: anemia    Designated  policy reviewed. Instructed to have someone stay 6 hours post procedure.       Chart review:     Electronic implanted devices? Yes: pacemaker    Recent diagnosis of diverticulitis within the last 6 weeks?  No      Medication review:    Diabetic? No    Anticoagulants? Yes Clopidogrel (Plavix): Recommended HOLD 5 days before procedure.  Consult with your managing provider.    Weight loss medication/injectable? No.    Other medication HOLDING recommendations:  N/A      Prep for procedure:     Prep instructions sent via [employer]     Reviewed procedure prep instructions.     Patient verbalized understanding and had no questions or concerns at this time.        Sarai Pierson RN  Endoscopy Procedure Pre Assessment   [phone] option 2

## 2024-10-08 ENCOUNTER — TELEPHONE (OUTPATIENT)
Dept: INTERNAL MEDICINE | Facility: CLINIC | Age: 89
End: 2024-10-08

## 2024-10-08 ENCOUNTER — OFFICE VISIT (OUTPATIENT)
Dept: FAMILY MEDICINE | Facility: CLINIC | Age: 89
End: 2024-10-08
Payer: COMMERCIAL

## 2024-10-08 ENCOUNTER — PATIENT OUTREACH (OUTPATIENT)
Dept: NEPHROLOGY | Facility: CLINIC | Age: 89
End: 2024-10-08

## 2024-10-08 ENCOUNTER — TELEPHONE (OUTPATIENT)
Dept: GASTROENTEROLOGY | Facility: CLINIC | Age: 89
End: 2024-10-08

## 2024-10-08 ENCOUNTER — LAB (OUTPATIENT)
Dept: LAB | Facility: CLINIC | Age: 89
End: 2024-10-08
Payer: COMMERCIAL

## 2024-10-08 VITALS
SYSTOLIC BLOOD PRESSURE: 160 MMHG | WEIGHT: 145.7 LBS | BODY MASS INDEX: 23.42 KG/M2 | DIASTOLIC BLOOD PRESSURE: 73 MMHG | HEIGHT: 66 IN | OXYGEN SATURATION: 98 % | HEART RATE: 70 BPM

## 2024-10-08 DIAGNOSIS — D64.9 ANEMIA, UNSPECIFIED TYPE: ICD-10-CM

## 2024-10-08 DIAGNOSIS — N18.30 STAGE 3 CHRONIC KIDNEY DISEASE, UNSPECIFIED WHETHER STAGE 3A OR 3B CKD (H): Primary | ICD-10-CM

## 2024-10-08 DIAGNOSIS — N18.4 CHRONIC KIDNEY DISEASE, STAGE IV (SEVERE) (H): ICD-10-CM

## 2024-10-08 DIAGNOSIS — N18.4 ANEMIA OF CHRONIC RENAL FAILURE, STAGE 4 (SEVERE) (H): ICD-10-CM

## 2024-10-08 DIAGNOSIS — R74.8 ELEVATED LIVER ENZYMES: ICD-10-CM

## 2024-10-08 DIAGNOSIS — N18.31 STAGE 3A CHRONIC KIDNEY DISEASE (H): Primary | ICD-10-CM

## 2024-10-08 DIAGNOSIS — D63.1 ANEMIA OF CHRONIC RENAL FAILURE, STAGE 4 (SEVERE) (H): ICD-10-CM

## 2024-10-08 DIAGNOSIS — F43.9 SITUATIONAL STRESS: ICD-10-CM

## 2024-10-08 DIAGNOSIS — I48.91 ATRIAL FIBRILLATION, UNSPECIFIED TYPE (H): ICD-10-CM

## 2024-10-08 DIAGNOSIS — I10 ESSENTIAL HYPERTENSION: ICD-10-CM

## 2024-10-08 LAB
ALBUMIN SERPL BCG-MCNC: 4.2 G/DL (ref 3.5–5.2)
ALP SERPL-CCNC: 112 U/L (ref 40–150)
ALT SERPL W P-5'-P-CCNC: 33 U/L (ref 0–50)
ANION GAP SERPL CALCULATED.3IONS-SCNC: 13 MMOL/L (ref 7–15)
AST SERPL W P-5'-P-CCNC: 44 U/L (ref 0–45)
BASOPHILS # BLD AUTO: 0.1 10E3/UL (ref 0–0.2)
BASOPHILS NFR BLD AUTO: 1 %
BILIRUB SERPL-MCNC: 0.5 MG/DL
BUN SERPL-MCNC: 38.1 MG/DL (ref 8–23)
CALCIUM SERPL-MCNC: 9.5 MG/DL (ref 8.8–10.4)
CHLORIDE SERPL-SCNC: 108 MMOL/L (ref 98–107)
CREAT SERPL-MCNC: 1.57 MG/DL (ref 0.51–0.95)
CREAT UR-MCNC: 24.6 MG/DL
EGFRCR SERPLBLD CKD-EPI 2021: 32 ML/MIN/1.73M2
EOSINOPHIL # BLD AUTO: 0.1 10E3/UL (ref 0–0.7)
EOSINOPHIL NFR BLD AUTO: 1 %
ERYTHROCYTE [DISTWIDTH] IN BLOOD BY AUTOMATED COUNT: 16.6 % (ref 10–15)
FERRITIN SERPL-MCNC: 777 NG/ML (ref 11–328)
GLUCOSE SERPL-MCNC: 86 MG/DL (ref 70–99)
HCO3 SERPL-SCNC: 24 MMOL/L (ref 22–29)
HCT VFR BLD AUTO: 34.6 % (ref 35–47)
HGB BLD-MCNC: 10.9 G/DL (ref 11.7–15.7)
IMM GRANULOCYTES # BLD: 0 10E3/UL
IMM GRANULOCYTES NFR BLD: 1 %
IRON BINDING CAPACITY (ROCHE): 227 UG/DL (ref 240–430)
IRON SATN MFR SERPL: 26 % (ref 15–46)
IRON SERPL-MCNC: 59 UG/DL (ref 37–145)
LYMPHOCYTES # BLD AUTO: 1.6 10E3/UL (ref 0.8–5.3)
LYMPHOCYTES NFR BLD AUTO: 26 %
MCH RBC QN AUTO: 29.5 PG (ref 26.5–33)
MCHC RBC AUTO-ENTMCNC: 31.5 G/DL (ref 31.5–36.5)
MCV RBC AUTO: 94 FL (ref 78–100)
MICROALBUMIN UR-MCNC: 477 MG/L
MICROALBUMIN/CREAT UR: 1939.02 MG/G CR (ref 0–25)
MONOCYTES # BLD AUTO: 1.3 10E3/UL (ref 0–1.3)
MONOCYTES NFR BLD AUTO: 21 %
NEUTROPHILS # BLD AUTO: 3.1 10E3/UL (ref 1.6–8.3)
NEUTROPHILS NFR BLD AUTO: 50 %
NRBC # BLD AUTO: 0 10E3/UL
NRBC BLD AUTO-RTO: 0 /100
PHOSPHATE SERPL-MCNC: 2.9 MG/DL (ref 2.5–4.5)
PLATELET # BLD AUTO: 72 10E3/UL (ref 150–450)
POTASSIUM SERPL-SCNC: 4.2 MMOL/L (ref 3.4–5.3)
PROT SERPL-MCNC: 7.1 G/DL (ref 6.4–8.3)
RBC # BLD AUTO: 3.7 10E6/UL (ref 3.8–5.2)
SODIUM SERPL-SCNC: 145 MMOL/L (ref 135–145)
WBC # BLD AUTO: 6.1 10E3/UL (ref 4–11)

## 2024-10-08 PROCEDURE — 84100 ASSAY OF PHOSPHORUS: CPT | Performed by: PATHOLOGY

## 2024-10-08 PROCEDURE — 99214 OFFICE O/P EST MOD 30 MIN: CPT | Performed by: FAMILY MEDICINE

## 2024-10-08 PROCEDURE — 82728 ASSAY OF FERRITIN: CPT | Performed by: PATHOLOGY

## 2024-10-08 PROCEDURE — G2211 COMPLEX E/M VISIT ADD ON: HCPCS | Performed by: FAMILY MEDICINE

## 2024-10-08 PROCEDURE — 85025 COMPLETE CBC W/AUTO DIFF WBC: CPT | Performed by: PATHOLOGY

## 2024-10-08 PROCEDURE — 82043 UR ALBUMIN QUANTITATIVE: CPT | Performed by: FAMILY MEDICINE

## 2024-10-08 PROCEDURE — 80053 COMPREHEN METABOLIC PANEL: CPT | Performed by: PATHOLOGY

## 2024-10-08 PROCEDURE — 83540 ASSAY OF IRON: CPT | Performed by: PATHOLOGY

## 2024-10-08 PROCEDURE — 99000 SPECIMEN HANDLING OFFICE-LAB: CPT | Performed by: PATHOLOGY

## 2024-10-08 PROCEDURE — 36415 COLL VENOUS BLD VENIPUNCTURE: CPT | Performed by: PATHOLOGY

## 2024-10-08 PROCEDURE — 83550 IRON BINDING TEST: CPT | Performed by: PATHOLOGY

## 2024-10-08 RX ORDER — ASPIRIN 81 MG/1
81 TABLET ORAL DAILY
COMMUNITY

## 2024-10-08 NOTE — PROGRESS NOTES
Assessment & Plan     Stage 3 chronic kidney disease, unspecified whether stage 3a or 3b CKD (H)  Stable today    Anemia, unspecified type  Stable today;   - CBC with platelets and differential; Future  - Comprehensive metabolic panel (BMP + Alb, Alk Phos, ALT, AST, Total. Bili, TP); Future    Elevated liver enzymes  Better  - Comprehensive metabolic panel (BMP + Alb, Alk Phos, ALT, AST, Total. Bili, TP); Future    Situational stress  Discussed     The longitudinal plan of care for the diagnosis(es)/condition(s) as documented were addressed during this visit. Due to the added complexity in care, I will continue to support Tessa in the subsequent management and with ongoing continuity of care.  32 minutes spent by me on the date of the encounter doing chart review, history and exam, documentation and further activities per the note      Since no GI or GI bleed sx, and hgb stable, on her request to minimize intervetniosn we will cancel EGD, close follow-up set up      No follow-ups on file.    Subjective   Tessa is a 89 year old, presenting for the following health issues:  Follow Up      10/8/2024     6:52 AM   Additional Questions   Roomed by Alia CANTU   Accompanied by N/A     History of Present Illness       Reason for visit:  Follow up   She is taking medications regularly.     Here in follow-up  No acute issues  No GI sx; has EGD set up soon. She would very much like to not do it if possible    Stress:  Recently found out one son has ALS and one has leukemia. Discussed the enormous stress of this.    No new c/o for her own health    BP at home wnl    No new cardio symptoms, has stents    Defers covid shot; plans to do flu shot, suggested rsv shot at drugstore too    Past Medical History:   Diagnosis Date    CAD (coronary artery disease)     CAD s/p PCI+BMS to MRCA 10/2002, PCI to mLCX 2/2003, PCI+DESx2 to pLAD 11/2007, PCI+DESx1 to dRCA 12/2007, PCI+ALVAREZ to RPDA 7/2013; on indefinite DAPT  10/27/2002     10/27/2002: AMI s/p PCI+BMS (3.5x18mm Bx velocity) to mRCA 2/5/2003: Back pain; PCI+stent to mLCX c/b distal embolization/slow flow 4/11/2003: Unstable angina; PCI+stent (Hepacoat velocity) to mLAD 11/27/2007: PCI+DESx2 to pLAD (IVUS w/ calcification of LMCA and ulcerated plaque pLAD, 80% dRCA; also had 80% dLCX, too small to stent) 12/11/2007: Staged PCI. PCI+DESx1 (3.5x13mm Cypher) to dRCA (indefinite DAPT recommended at this time) 6/27/2008: Angina. mLCX stent 70% ISR. LAD and RCA stents patent. Myocardium at risk from LCX felt to be small, medical management preferred to PCI. 11/10/2009: Angina. Findings unchanged from 6/27/2008, FFR LAD 0.90. Medical management recommended. 7/23/2013: Unstable angina; PCI+ALVAREZ to mPDA. Diagnostic findings: LMCA 40% distal. LAD: pLAD stents patent mLAD 30% ISR dLAD 50% diffuse, D2 diffuse disease. LCX 80% mid ISR. RCA diffuse <30% mid, RPLAs diffuse disease, RPDA 100% occlusion.      Cardiac Pacemaker- [employer]tr[employer], dual chamber- NOT dependant 11/28/2007    CKD (chronic kidney disease), stage IV (H)     Hyperlipidemia LDL goal <70     Hypertension     Stented coronary artery 10-    RCA    Stented coronary artery 2-5-2003    LCx    Stented coronary artery 4-    LAD    Stented coronary artery 11-    LAD    Stented coronary artery 12-    RCA    Transient complete heart block (H) 11/28/2007     Past Surgical History:   Procedure Laterality Date    CHOLECYSTECTOMY      CV CORONARY ANGIOGRAM N/A 6/17/2022    Procedure: Coronary Angiogram;  Surgeon: Constantine Macias MD;  Location: [facility] CARDIAC CATH LAB    CV CORONARY ANGIOGRAM N/A 7/17/2023    Procedure: Coronary Angiogram;  Surgeon: Constantine Macias MD;  Location: [facility] CARDIAC CATH LAB    CV LEFT ATRIAL APPENDAGE CLOSURE N/A 8/8/2024    Procedure: Left Atrial Appendage Closure;  Surgeon: Rodrick Garcia MD;  Location:  HEART CARDIAC CATH LAB    CV PCI N/A 6/17/2022    Procedure:  Percutaneous Coronary Intervention;  Surgeon: Constantine Macias MD;  Location:  HEART CARDIAC CATH LAB    CV PCI N/A 7/17/2023    Procedure: Percutaneous Coronary Intervention;  Surgeon: Constantine Macias MD;  Location:  HEART CARDIAC CATH LAB    CV PCI N/A 11/6/2023    Procedure: Percutaneous Coronary Intervention;  Surgeon: Constantine Macias MD;  Location:  HEART CARDIAC CATH LAB    CV PERICARDIOCENTESIS N/A 8/8/2024    Procedure: Pericardiocentesis;  Surgeon: Rodrick Garcia MD;  Location:  HEART CARDIAC CATH LAB    CV PERICARDIOCENTESIS N/A 8/11/2024    Procedure: Pericardial Drain Adjustment, Possible New Pericardial Drain Placement;  Surgeon: Rodrick Garcia MD;  Location:  HEART CARDIAC CATH LAB    CV RIGHT HEART CATH MEASUREMENTS RECORDED N/A 6/17/2022    Procedure: Right Heart Catheterization;  Surgeon: Constantine Macias MD;  Location:  HEART CARDIAC CATH LAB    EP PACEMAKER N/A 10/15/2019    Procedure: EP PACEMAKER;  Surgeon: Anthony Zhu MD;  Location: [facility] CARDIAC CATH LAB    ESOPHAGOSCOPY, GASTROSCOPY, DUODENOSCOPY (EGD), COMBINED N/A 7/20/2023    Procedure: Esophagoscopy, gastroscopy, duodenoscopy (EGD), combined;  Surgeon: Bekah Dash DO;  Location:  GI    ESOPHAGOSCOPY, GASTROSCOPY, DUODENOSCOPY (EGD), COMBINED N/A 5/2/2024    Procedure: Esophagoscopy, gastroscopy, duodenoscopy (EGD), combined;  Surgeon: Tavo Donaldson MD;  Location:  GI    HC PPM INSERTION NEW/REPLACEMENT W/ ATRIAL&VENTRICULAR LEAD  11-    HYSTERECTOMY      LAPAROTOMY EXPLORATORY N/A 4/13/2024    Procedure: Laparotomy exploratory, Wound Vac Placement.;  Surgeon: Anthony Trammell MD;  Location: [facility] OR    LAPAROTOMY EXPLORATORY N/A 4/14/2024    Procedure: Abdominal Re-Exploration and Closure;  Surgeon: Anthony Trammell MD;  Location: [facility] OR    LAPAROTOMY EXPLORATORY N/A 4/13/2024    Procedure: Exploratory Laparotomy;  Surgeon: Anthony Trammell  MD;  Location: [facility] OR     Current Outpatient Medications   Medication Sig Dispense Refill    acetaminophen (TYLENOL) 325 MG tablet Take 975 mg by mouth nightly as needed for pain      allopurinol (ZYLOPRIM) 100 MG tablet Take 1 tablet (100 mg) by mouth daily 90 tablet 0    amLODIPine (NORVASC) 5 MG tablet Take 1 tablet (5 mg) by mouth daily. 90 tablet 3    aspirin 81 MG EC tablet Take 81 mg by mouth daily.      BUDESONIDE IN Inhale into the lungs.      clopidogrel (PLAVIX) 75 MG tablet Take 1 tablet (75 mg) by mouth daily 90 tablet 1    fluticasone-salmeterol (ADVAIR) 250-50 MCG/ACT inhaler INHALE 1 DOSE BY MOUTH TWICE DAILY 60 each 8    furosemide (LASIX) 40 MG tablet Take 1 tablet (40 mg) by mouth daily May take an additional 20 mg at noon as needed for swelling. 90 tablet 2    isosorbide mononitrate (IMDUR) 30 MG 24 hr tablet Take 3 tablets (90 mg) by mouth daily 90 tablet 11    methocarbamol (ROBAXIN) 500 MG tablet Take 1 tablet (500 mg) by mouth every 6 hours as needed for muscle spasms 30 tablet 0    metoprolol tartrate (LOPRESSOR) 25 MG tablet Take 2 tablets (50 mg) by mouth 2 times daily 180 tablet 3    Multiple Vitamins-Minerals (PRESERVISION AREDS 2+MULTI VIT PO) Take 1 capsule by mouth 2 times daily      omeprazole (PRILOSEC) 40 MG DR capsule Take 1 capsule (40 mg) by mouth daily. 90 capsule 3    potassium chloride ER (K-TAB) 20 MEQ CR tablet Take 1 tablet (20 mEq) by mouth daily 90 tablet 3    Psyllium (METAMUCIL PO) Take by mouth.      rosuvastatin (CRESTOR) 20 MG tablet Take 1 tablet (20 mg) by mouth daily. 90 tablet 1    sodium bicarbonate 650 MG tablet Take 1 tablet (650 mg) by mouth 2 times daily 60 tablet 11    timolol maleate (TIMOPTIC) 0.5 % ophthalmic solution INSTILL 1 DROP INTO EACH EYE ONCE DAILY IN THE MORNING      vitamin B-12 (CYANOCOBALAMIN) 500 MCG tablet Take 1 tablet by mouth daily      vitamin D3 25 mcg (1000 units) tablet Take 1 tablet (25 mcg) by mouth every other day 90 tablet 3  "    No current facility-administered medications for this visit.     Allergies   Allergen Reactions    Codeine Sulfate Itching    Shrimp Swelling    Oxycodone Other (See Comments)    Shrimp Extract Swelling    Bactrim [Sulfamethoxazole W/Trimethoprim]      Patient unable to recall    Biaxin [Clarithromycin]     Chlorthalidone Nausea and Vomiting    Clonidine     Darvon [Propoxyphene Hcl]     Dilaudid [Hydromorphone] Visual Disturbance and Hallucination     Tolerated in April 2024 hospital admissions    Gabapentin Fatigue and Confusion     \"felt drunk\"    Indomethacin     Levaquin [Levofloxacin Hemihydrate]     Morphine Sulfate     Percocet [Oxycodone-Acetaminophen] Hallucination    Pregabalin Itching and Fatigue    Simvastatin Palpitations     Muscle weakness, leg cramping      Spironolactone      Dehydrated      Terazosin      Family History   Problem Relation Age of Onset    Cancer Sister 82        bladder cancer     Social History     Socioeconomic History    Marital status:      Spouse name: Not on file    Number of children: 4    Years of education: Not on file    Highest education level: Not on file   Occupational History     Employer: [employer]   Tobacco Use    Smoking status: Former     Current packs/day: 0.30     Average packs/day: 0.3 packs/day for 15.0 years (4.5 ttl pk-yrs)     Types: Cigarettes    Smokeless tobacco: Never    Tobacco comments:     Quit 35+ years ago   Vaping Use    Vaping status: Never Used   Substance and Sexual Activity    Alcohol use: Not on file    Drug use: No    Sexual activity: Not Currently     Partners: Male     Birth control/protection: Post-menopausal   Other Topics Concern     Service Not Asked    Blood Transfusions Yes    Caffeine Concern Not Asked    Occupational Exposure Not Asked    Hobby Hazards Not Asked    Sleep Concern Not Asked    Stress Concern Not Asked    Weight Concern Not Asked    Special Diet Not Asked    Back Care Not Asked    " "Exercise No    Bike Helmet Not Asked    Seat Belt Not Asked    Self-Exams Not Asked    Parent/sibling w/ CABG, MI or angioplasty before 65F 55M? Not Asked   Social History Narrative    Not on file     Social Determinants of Health     Financial Resource Strain: Low Risk  (11/15/2023)    Financial Resource Strain     Within the past 12 months, have you or your family members you live with been unable to get utilities (heat, electricity) when it was really needed?: No   Food Insecurity: Low Risk  (11/15/2023)    Food Insecurity     Within the past 12 months, did you worry that your food would run out before you got money to buy more?: No     Within the past 12 months, did the food you bought just not last and you didn t have money to get more?: No   Transportation Needs: Low Risk  (11/15/2023)    Transportation Needs     Within the past 12 months, has lack of transportation kept you from medical appointments, getting your medicines, non-medical meetings or appointments, work, or from getting things that you need?: No   Physical Activity: Not on file   Stress: Not on file   Social Connections: Not on file   Interpersonal Safety: Low Risk  (10/8/2024)    Interpersonal Safety     Do you feel physically and emotionally safe where you currently live?: Yes     Within the past 12 months, have you been hit, slapped, kicked or otherwise physically hurt by someone?: No     Within the past 12 months, have you been humiliated or emotionally abused in other ways by your partner or ex-partner?: No   Housing Stability: Low Risk  (11/15/2023)    Housing Stability     Do you have housing? : Yes     Are you worried about losing your housing?: No                   Objective    BP (!) 174/82 (BP Location: Right arm, Patient Position: Sitting, Cuff Size: Adult Regular)   Pulse 70   Ht 1.676 m (5' 5.98\")   Wt 66.1 kg (145 lb 11.2 oz)   SpO2 98%   BMI 23.53 kg/m    Body mass index is 23.53 kg/m .  Physical Exam   GENERAL: alert and no " distress  NECK: no adenopathy, no asymmetry, masses, or scars  RESP: lungs clear to auscultation - no rales, rhonchi or wheezes  CV: regular rate and rhythm, normal S1 S2, no S3 or S4, no murmur, click or rub, no peripheral edema  ABDOMEN: soft, nontender, no hepatosplenomegaly, no masses and bowel sounds normal  MS: no gross musculoskeletal defects noted, no edema            Signed Electronically by: Pedro Gomez MD

## 2024-10-08 NOTE — TELEPHONE ENCOUNTER
Pt was informed the results and recommendation. I will cancel EGD.    Soon-Mi  ----------------------------------------------------------------------------------      ----- Message from Pedro Gomez sent at 10/8/2024 11:11 AM CDT -----  Can you let her know that the labs are good and no need to do EGD next week, can we help her cancel it?

## 2024-10-08 NOTE — TELEPHONE ENCOUNTER
Caller: Soonmi-patient's doctor     Reason for Reschedule/Cancellation   (please be detailed, any staff messages or encounters to note?): Patient's doctor called in to cancel the EGD due to patient no longer needing it.       Prior to reschedule please review:  Ordering Provider:     Pedro Gomez MD in [facility]     Sedation Determined: moderate  Does patient have any ASC Exclusions, please identify?: Y pacemaker       Notes on Cancelled Procedure:  Procedure: Upper Endoscopy [EGD]   Date: 10/16/2024  Location: [facility]; [address] [place] [address], [address] Floor, [place] [address]   Surgeon: noe      Rescheduled: No,         Did you cancel or rescheduled an EUS procedure? No.

## 2024-10-08 NOTE — CONFIDENTIAL NOTE
Nephrology Note: RN CC Chart Review    REASON FOR ENCOUNTER:     Chart reviewed by nephrology RN CC                        Neph tracking sheet updated  SITUATION/BACKROUND:     Last nephrology visit:    Last Renal Panel:  Sodium   Date Value Ref Range Status   10/08/2024 145 135 - 145 mmol/L Final   08/26/2020 141 133 - 144 mmol/L Final     Potassium   Date Value Ref Range Status   10/08/2024 4.2 3.4 - 5.3 mmol/L Final   11/21/2022 4.8 3.4 - 5.3 mmol/L Final   08/26/2020 4.4 3.4 - 5.3 mmol/L Final     Potassium POCT   Date Value Ref Range Status   08/08/2024 3.5 3.4 - 5.3 mmol/L Final     Comment:     CRITICAL RESULTS NOTED BY CCL and MD     Chloride   Date Value Ref Range Status   10/08/2024 108 (H) 98 - 107 mmol/L Final   11/21/2022 110 (H) 94 - 109 mmol/L Final   08/26/2020 111 (H) 94 - 109 mmol/L Final     Carbon Dioxide   Date Value Ref Range Status   08/26/2020 24 20 - 32 mmol/L Final     Carbon Dioxide (CO2)   Date Value Ref Range Status   10/08/2024 24 22 - 29 mmol/L Final   11/21/2022 24 20 - 32 mmol/L Final     Anion Gap   Date Value Ref Range Status   10/08/2024 13 7 - 15 mmol/L Final   11/21/2022 6 3 - 14 mmol/L Final   08/26/2020 8 3 - 14 mmol/L Final     Glucose   Date Value Ref Range Status   10/08/2024 86 70 - 99 mg/dL Final   11/21/2022 79 70 - 99 mg/dL Final   08/26/2020 84 70 - 99 mg/dL Final     GLUCOSE BY METER POCT   Date Value Ref Range Status   08/09/2024 182 (H) 70 - 99 mg/dL Final     Urea Nitrogen   Date Value Ref Range Status   10/08/2024 38.1 (H) 8.0 - 23.0 mg/dL Final   11/21/2022 35 (H) 7 - 30 mg/dL Final   08/26/2020 41 (H) 7 - 30 mg/dL Final     Creatinine   Date Value Ref Range Status   10/08/2024 1.57 (H) 0.51 - 0.95 mg/dL Final   08/26/2020 1.70 (H) 0.52 - 1.04 mg/dL Final     GFR Estimate   Date Value Ref Range Status   10/08/2024 32 (L) >60 mL/min/1.73m2 Final     Comment:     eGFR calculated using 2021 CKD-EPI equation.   08/26/2020 27 (L) >60 mL/min/[1.73_m2] Final      Comment:     Non  GFR Calc  Starting 12/18/2018, serum creatinine based estimated GFR (eGFR) will be   calculated using the Chronic Kidney Disease Epidemiology Collaboration   (CKD-EPI) equation.       Calcium   Date Value Ref Range Status   10/08/2024 9.5 8.8 - 10.4 mg/dL Final     Comment:     Reference intervals for this test were updated on 7/16/2024 to reflect our healthy population more accurately. There may be differences in the flagging of prior results with similar values performed with this method. Those prior results can be interpreted in the context of the updated reference intervals.   08/26/2020 9.0 8.5 - 10.1 mg/dL Final     Phosphorus   Date Value Ref Range Status   10/08/2024 2.9 2.5 - 4.5 mg/dL Final   08/26/2020 3.6 2.5 - 4.5 mg/dL Final     Albumin   Date Value Ref Range Status   10/08/2024 4.2 3.5 - 5.2 g/dL Final   11/21/2022 3.8 3.4 - 5.0 g/dL Final   08/26/2020 3.6 3.4 - 5.0 g/dL Final         Neph Tracking Status:  Neph Tracking Flowsheet Last Filled Values       [facility] CKD Basics Referral --  declined [place]    Patient's Referral Dates Auto Populate Patient's Referral Dates    Anemia Services Referral 6/24/24    MTM Referral 7/8/24    Home Care Referral 5/13/24    Journey Referral 10/3/23              ASSESSMENT/PLAN     Patient to follow up as scheduled at next appointment  Patient to call/MyChart message with updates    Upcoming Appointments:  Future Appts Next 180 days       Visit Type Date Time Department    RETURN NEPHROLOGY 10/28/2024  2:10 PM [facility] NEPHROLOGY    RETURN NEPHROLOGY 12/2/2024  9:30 AM [facility] NEPHROLOGY              CINTHIA MUNGUIA RN

## 2024-10-12 ENCOUNTER — HEALTH MAINTENANCE LETTER (OUTPATIENT)
Age: 89
End: 2024-10-12

## 2024-10-25 ENCOUNTER — LAB (OUTPATIENT)
Dept: LAB | Facility: CLINIC | Age: 89
End: 2024-10-25
Payer: COMMERCIAL

## 2024-10-25 DIAGNOSIS — D63.1 ANEMIA OF CHRONIC RENAL FAILURE, STAGE 4 (SEVERE) (H): ICD-10-CM

## 2024-10-25 DIAGNOSIS — N18.4 CHRONIC KIDNEY DISEASE, STAGE IV (SEVERE) (H): ICD-10-CM

## 2024-10-25 DIAGNOSIS — N18.4 ANEMIA OF CHRONIC RENAL FAILURE, STAGE 4 (SEVERE) (H): ICD-10-CM

## 2024-10-25 DIAGNOSIS — I10 ESSENTIAL HYPERTENSION: ICD-10-CM

## 2024-10-25 DIAGNOSIS — I25.118 CORONARY ARTERY DISEASE OF NATIVE ARTERY OF NATIVE HEART WITH STABLE ANGINA PECTORIS (H): Primary | ICD-10-CM

## 2024-10-25 LAB
ALBUMIN SERPL BCG-MCNC: 3.7 G/DL (ref 3.5–5.2)
ANION GAP SERPL CALCULATED.3IONS-SCNC: 13 MMOL/L (ref 7–15)
BUN SERPL-MCNC: 23.4 MG/DL (ref 8–23)
CALCIUM SERPL-MCNC: 9.2 MG/DL (ref 8.8–10.4)
CHLORIDE SERPL-SCNC: 109 MMOL/L (ref 98–107)
CREAT SERPL-MCNC: 1.6 MG/DL (ref 0.51–0.95)
CREAT UR-MCNC: 80.8 MG/DL
EGFRCR SERPLBLD CKD-EPI 2021: 31 ML/MIN/1.73M2
ERYTHROCYTE [DISTWIDTH] IN BLOOD BY AUTOMATED COUNT: 16.3 % (ref 10–15)
FERRITIN SERPL-MCNC: 611 NG/ML (ref 11–328)
GLUCOSE SERPL-MCNC: 125 MG/DL (ref 70–99)
HCO3 SERPL-SCNC: 24 MMOL/L (ref 22–29)
HCT VFR BLD AUTO: 33.2 % (ref 35–47)
HGB BLD-MCNC: 10.1 G/DL (ref 11.7–15.7)
MCH RBC QN AUTO: 29.4 PG (ref 26.5–33)
MCHC RBC AUTO-ENTMCNC: 30.4 G/DL (ref 31.5–36.5)
MCV RBC AUTO: 97 FL (ref 78–100)
MICROALBUMIN UR-MCNC: 1008 MG/L
MICROALBUMIN/CREAT UR: 1247.52 MG/G CR (ref 0–25)
PHOSPHATE SERPL-MCNC: 3.2 MG/DL (ref 2.5–4.5)
PLATELET # BLD AUTO: 128 10E3/UL (ref 150–450)
POTASSIUM SERPL-SCNC: 3.9 MMOL/L (ref 3.4–5.3)
PTH-INTACT SERPL-MCNC: 93 PG/ML (ref 15–65)
RBC # BLD AUTO: 3.44 10E6/UL (ref 3.8–5.2)
SODIUM SERPL-SCNC: 146 MMOL/L (ref 135–145)
WBC # BLD AUTO: 6.5 10E3/UL (ref 4–11)

## 2024-10-25 PROCEDURE — 36415 COLL VENOUS BLD VENIPUNCTURE: CPT

## 2024-10-25 PROCEDURE — 82728 ASSAY OF FERRITIN: CPT

## 2024-10-25 PROCEDURE — 82043 UR ALBUMIN QUANTITATIVE: CPT

## 2024-10-25 PROCEDURE — 80069 RENAL FUNCTION PANEL: CPT

## 2024-10-25 PROCEDURE — 83970 ASSAY OF PARATHORMONE: CPT

## 2024-10-25 PROCEDURE — 83540 ASSAY OF IRON: CPT

## 2024-10-25 PROCEDURE — 85027 COMPLETE CBC AUTOMATED: CPT

## 2024-10-25 PROCEDURE — 83550 IRON BINDING TEST: CPT

## 2024-10-25 PROCEDURE — 82570 ASSAY OF URINE CREATININE: CPT

## 2024-10-26 LAB
IRON BINDING CAPACITY (ROCHE): 223 UG/DL (ref 240–430)
IRON SATN MFR SERPL: 27 % (ref 15–46)
IRON SERPL-MCNC: 61 UG/DL (ref 37–145)

## 2024-10-27 ENCOUNTER — PATIENT OUTREACH (OUTPATIENT)
Dept: CARE COORDINATION | Facility: CLINIC | Age: 89
End: 2024-10-27
Payer: COMMERCIAL

## 2024-10-28 ENCOUNTER — OFFICE VISIT (OUTPATIENT)
Dept: NEPHROLOGY | Facility: CLINIC | Age: 89
End: 2024-10-28
Payer: COMMERCIAL

## 2024-10-28 VITALS
WEIGHT: 137 LBS | DIASTOLIC BLOOD PRESSURE: 82 MMHG | BODY MASS INDEX: 22.12 KG/M2 | SYSTOLIC BLOOD PRESSURE: 140 MMHG | HEART RATE: 84 BPM

## 2024-10-28 DIAGNOSIS — D63.1 ANEMIA DUE TO STAGE 3B CHRONIC KIDNEY DISEASE (H): ICD-10-CM

## 2024-10-28 DIAGNOSIS — N18.32 STAGE 3B CHRONIC KIDNEY DISEASE (H): Primary | ICD-10-CM

## 2024-10-28 DIAGNOSIS — N18.32 ANEMIA DUE TO STAGE 3B CHRONIC KIDNEY DISEASE (H): ICD-10-CM

## 2024-10-28 DIAGNOSIS — I10 ESSENTIAL HYPERTENSION: ICD-10-CM

## 2024-10-28 DIAGNOSIS — E87.6 HYPOKALEMIA: ICD-10-CM

## 2024-10-28 DIAGNOSIS — E87.20 METABOLIC ACIDOSIS: ICD-10-CM

## 2024-10-28 DIAGNOSIS — R80.9 ALBUMINURIA: ICD-10-CM

## 2024-10-28 DIAGNOSIS — N25.81 SECONDARY RENAL HYPERPARATHYROIDISM (H): ICD-10-CM

## 2024-10-28 PROBLEM — G25.81 RESTLESS LEGS: Status: ACTIVE | Noted: 2024-09-04

## 2024-10-28 PROBLEM — R05.3 CHRONIC COUGH: Status: ACTIVE | Noted: 2024-05-09

## 2024-10-28 PROBLEM — I13.10 HYPERTENSIVE HEART AND KIDNEY DISEASE: Status: ACTIVE | Noted: 2024-05-09

## 2024-10-28 PROBLEM — I25.2 HISTORY OF MYOCARDIAL INFARCTION: Status: ACTIVE | Noted: 2024-10-28

## 2024-10-28 PROCEDURE — 99214 OFFICE O/P EST MOD 30 MIN: CPT | Performed by: PHYSICIAN ASSISTANT

## 2024-10-28 RX ORDER — LISINOPRIL 5 MG/1
5 TABLET ORAL DAILY
Qty: 90 TABLET | Refills: 3 | Status: SHIPPED | OUTPATIENT
Start: 2024-10-28 | End: 2024-10-28

## 2024-10-28 RX ORDER — LISINOPRIL 2.5 MG/1
2.5 TABLET ORAL DAILY
Qty: 90 TABLET | Refills: 3 | Status: SHIPPED | OUTPATIENT
Start: 2024-10-28

## 2024-10-28 NOTE — PROGRESS NOTES
Nephrology Progress Note   10/28/2024    Assessment and Plan:   89 year old female with history of long standing HTN, CAD s/p multiple stents, who presents for followup of CKD stage 4, baseline SCr 1.8-2.2 mg/dL now with proteinuria. She had another angiogram with stent done June 2022 and November 2023 (she now has 10 stents)    1. CKD stage 4- baseline SCr 1.8-2.2mg/ dL, eGFR 25-29 ml/min. Mild/ minimal proteinuria now up to > 1gram, due to ? Uncontrolled BP, now better at 0.58g/g cr  - recent urine albumin/cr 1247 mg/g, improved from 1939 on 10/8, was down to 243 mg/g in 5/2024.   Her swelling was previously occasional. She's had more swelling since taking amlodipine 10 mg daily and had been taking furosemide 40 mg BID. Now only taking amlodipine 5 mg daily and 40mg once a day of lasix. Swelling is OK , takes 20mg in PM if needed.  - Screatinine stable at 1.6  - Has proteinuria - may be due to BP not ideally controlled. Consider SGLT2 inhibitor?  Unclear benefit at her age.  - BP 130s/70s at home. Was 120/70 in clinic  - monitor labs, relatively stable at this time  - start lisinopril 2.5 mg daily, cannot see that she has been on this, given proteinuria and CKD-   - will check some blood pressure at home    2. Electrolytes/Acid Base status- normal.    Hypokalemia- takes potassium 20 mEq pills daily  - K 3.9, Na 146   Metabolic acidosis- bicarbonate was lower at 17, now up to 24  - continue sodium bicarb 650 mg BID, consider lowering to once a day if > 27 on next check  - also taking Kcl ER 20 mEq daily, may stop this if K higher with adding lisinopril    3. Hypertension/Volume status-  She is high in clinic today 170/75, recheck was   She is on amlodipine 5 mg daily, isosorbide mononitrate 90 mg daily, metoprolol 50 mg BID, furosemide 40 mg daily, an extra 20 mg in pm as needed (rarely uses)  - no longer on hydralazine  - starting lisinopril 5 mg daily  - continue checking BP at home  - follows with cardiology for  Afib, intolerance to AC due to GIB, s/p Watchman procedure on 8/4/2024. Off eliquis, now taking plavix  -patient reports history of reaction/intolerance to Spironolactone, Chlorthalidone, Clonidine and Terazosin in the past   - had new stent to RCA placed on 7/17/23 and another in November 2023  - ECHO 8/29/2024, limited and compared to 8/8/2024  Interpretation Summary  Left ventricular size, wall motion and function are normal. The ejection  fraction is 60-65%.  Right ventricular function, chamber size, wall motion, and thickness are  normal.  Mild to moderate mitral insufficiency is present.  Mild to moderate tricuspid insufficiency is present.  Dilation of the inferior vena cava is present with abnormal respiratory  variation in diameter. No pericardial effusion is present.    4. Anemia- hgb at 10.1, up from 8.2 in August 2024, from 7.5 on 7/5/24.  seen in ED on 7/5, Hgb 6.9 was transfused. Recent iron sat 27%,  - history of transfusions- she was seen by hematology and GI. Recommended to stop oral iron and start IV iron due to potential for GI bleeding and difficulty in knowing if black stools are from iron or bleeding.   - low platelet count- 128, improved from 72  - continue follow up with anemia clinic      5. BMD  - last serum calcium and phosphorus were normal  -secondary hyperparathyroidism-PTH was 93  - vit D is high normal at 60- repeat yearly.     Assessment and plan was discussed with patient and she voiced her understanding and agreement.    - return as planned with Dr. Martin December.       Reason for Visit:  Tessa Mansfield is an 89 year old female with HTN, who presents for CKD management.     HPI:  She is a pleasant female with PMMHx significant for stage III CKD presumed secondary to hypertensive nephrosclerosis.Her renal function has been  relatively stable, ranging from 1.8-2.2 mg/dL over the years. She has history of CAD s/p multiple stents  (8 stents in all) since 2004 and a pacemaker in  2007. She follows with cardiologist Dr Santoyo and Dr Zhu.    She had elected stent placed in RCA on 7/17/23 to help with angina.     She was admitted from 7/19 to 7/23/23 for melena secondary to bleeding colonic angiodysplastic lesion, acute on chronic anemia. S/p repair.     She has been bleeding again and was admitted from 7/5-7/6/24 with acute on chronic anemia and had another transfusion. She has been referred to anemia clinic.     She has been taking furosemide once a day for a while, takes 20 mg in the afternoon as needed.    She is eating a little better. Per patient she has lost ~ 20 lbs since March, now stabilized.   She denies any further diarrhea no n/v.     She had another angiogram in November 2023, she was having chest pain (back pain ) with exertion which resolved after stent.    She is s/p watchman procedure on 8/4, Watchman procedure complicated by new pericardial effusion without tamponade. S/p pericardiocentesis 8/8 with 220 ml off. There was no contrast extravasation noted. She was admitted for post procedural monitoring with pericardial drain in place. Ultimately the drain was removed and she was discharged home.     Her son was ?diagnosed with Albina Gehrig but second opinion at [place] thinks it is more a spine / nerve impingement?      She is feeling much better. She has a good appetite, no n/v/d. She has mild LE edema, rarely needs an extra lasix. BP's have been upper 130s-140s, occasionally in the 150s/ systolic. BP in clinic today is 170/75, recheck was 140/82.     Home BP: 130s/    Baseline Cr: 1.8-2.2    ROS:  A comprehensive review of systems was obtained and negative, except as noted in the HPI or PMH.    Active Medical Problems:  Patient Active Problem List   Diagnosis    Degeneration of cervical intervertebral disc    Nonallopathic lesion of cervical region    Nonallopathic lesion of thoracic region    Coronary artery disease of native artery of native heart with stable angina  pectoris (H)    Dizziness and giddiness    Edema    Esophageal reflux    Gout    Essential hypertension    Obstructive sleep apnea    Osteomalacia    Post-menopausal osteoporosis    Cardiac Pacemaker- Medtronic, dual chamber- NOT dependant    Transient complete heart block (H)    Sinus node dysfunction (H)    Disturbance of skin sensation    NSTEMI (non-ST elevated myocardial infarction) (H)    Arrhythmia    Sick sinus syndrome (H)    Non-rheumatic mitral regurgitation    Non-rheumatic tricuspid valve insufficiency    Anemia of chronic renal failure, stage 4 (severe) (H)    Status post coronary angiogram    Secondary renal hyperparathyroidism (H)    Thrombocytopenia (H)    Renal hypertension    Acute on chronic diastolic (congestive) heart failure (H)    Chest pain, unspecified type    Long term (current) use of anticoagulants    Melena    General weakness    S/P coronary artery stent placement    Antiplatelet or antithrombotic long-term use    Anemia, unspecified type    Aftercare following surgery of the musculoskeletal system    Closed right femoral fracture (H)    Hyperlipidemia    Hypokalemia    Orthostatic hypotension    Osteoarthritis of right patellofemoral joint    Postoperative anemia due to acute blood loss    Chronic anemia    ACP (advance care planning)    Chronic kidney disease, stage 3 (H)    Biomechanical lesion, unspecified    GERD (gastroesophageal reflux disease)    Weakness    Acute cystitis with hematuria    Leukocytosis, unspecified type    Small bowel obstruction (H)    UGIB (upper gastrointestinal bleed)    Paroxysmal atrial fibrillation (H)    Stage 3a chronic kidney disease (H)    Symptomatic anemia    Gastrointestinal hemorrhage, unspecified gastrointestinal hemorrhage type    Atrial fibrillation (H)       Personal Hx:   Social History     Socioeconomic History    Marital status:      Spouse name: Not on file    Number of children: 4    Years of education: Not on file    Highest  education level: Not on file   Occupational History     Employer: [employer]   Tobacco Use    Smoking status: Former     Current packs/day: 0.30     Average packs/day: 0.3 packs/day for 15.0 years (4.5 ttl pk-yrs)     Types: Cigarettes    Smokeless tobacco: Never    Tobacco comments:     Quit 35+ years ago   Vaping Use    Vaping status: Never Used   Substance and Sexual Activity    Alcohol use: Not on file    Drug use: No    Sexual activity: Not Currently     Partners: Male     Birth control/protection: Post-menopausal   Other Topics Concern     Service Not Asked    Blood Transfusions Yes    Caffeine Concern Not Asked    Occupational Exposure Not Asked    Hobby Hazards Not Asked    Sleep Concern Not Asked    Stress Concern Not Asked    Weight Concern Not Asked    Special Diet Not Asked    Back Care Not Asked    Exercise No    Bike Helmet Not Asked    Seat Belt Not Asked    Self-Exams Not Asked    Parent/sibling w/ CABG, MI or angioplasty before 65F 55M? Not Asked   Social History Narrative    Not on file     Social Drivers of Health     Financial Resource Strain: Low Risk  (11/15/2023)    Financial Resource Strain     Within the past 12 months, have you or your family members you live with been unable to get utilities (heat, electricity) when it was really needed?: No   Food Insecurity: Low Risk  (11/15/2023)    Food Insecurity     Within the past 12 months, did you worry that your food would run out before you got money to buy more?: No     Within the past 12 months, did the food you bought just not last and you didn t have money to get more?: No   Transportation Needs: Low Risk  (11/15/2023)    Transportation Needs     Within the past 12 months, has lack of transportation kept you from medical appointments, getting your medicines, non-medical meetings or appointments, work, or from getting things that you need?: No   Physical Activity: Not on file   Stress: Not on file   Social Connections:  "Not on file   Interpersonal Safety: Low Risk  (10/8/2024)    Interpersonal Safety     Do you feel physically and emotionally safe where you currently live?: Yes     Within the past 12 months, have you been hit, slapped, kicked or otherwise physically hurt by someone?: No     Within the past 12 months, have you been humiliated or emotionally abused in other ways by your partner or ex-partner?: No   Housing Stability: Low Risk  (11/15/2023)    Housing Stability     Do you have housing? : Yes     Are you worried about losing your housing?: No       Allergies:  Allergies   Allergen Reactions    Codeine Sulfate Itching    Shrimp Swelling    Oxycodone Other (See Comments)    Shrimp Extract Swelling    Bactrim [Sulfamethoxazole W/Trimethoprim]      Patient unable to recall    Biaxin [Clarithromycin]     Chlorthalidone Nausea and Vomiting    Clonidine     Darvon [Propoxyphene Hcl]     Dilaudid [Hydromorphone] Visual Disturbance and Hallucination     Tolerated in April 2024 hospital admissions    Gabapentin Fatigue and Confusion     \"felt drunk\"    Indomethacin     Levaquin [Levofloxacin Hemihydrate]     Morphine Sulfate     Percocet [Oxycodone-Acetaminophen] Hallucination    Pregabalin Itching and Fatigue    Simvastatin Palpitations     Muscle weakness, leg cramping      Spironolactone      Dehydrated      Terazosin        Current Outpatient Medications   Medication Sig Dispense Refill    acetaminophen (TYLENOL) 325 MG tablet Take 975 mg by mouth nightly as needed for pain      allopurinol (ZYLOPRIM) 100 MG tablet Take 1 tablet (100 mg) by mouth daily 90 tablet 0    amLODIPine (NORVASC) 5 MG tablet Take 1 tablet (5 mg) by mouth daily. 90 tablet 3    aspirin 81 MG EC tablet Take 81 mg by mouth daily.      BUDESONIDE IN Inhale into the lungs.      clopidogrel (PLAVIX) 75 MG tablet Take 1 tablet (75 mg) by mouth daily 90 tablet 1    fluticasone-salmeterol (ADVAIR) 250-50 MCG/ACT inhaler INHALE 1 DOSE BY MOUTH TWICE DAILY 60 " each 8    furosemide (LASIX) 40 MG tablet Take 1 tablet (40 mg) by mouth daily May take an additional 20 mg at noon as needed for swelling. 90 tablet 2    isosorbide mononitrate (IMDUR) 30 MG 24 hr tablet Take 3 tablets (90 mg) by mouth daily 90 tablet 11    methocarbamol (ROBAXIN) 500 MG tablet Take 1 tablet (500 mg) by mouth every 6 hours as needed for muscle spasms 30 tablet 0    metoprolol tartrate (LOPRESSOR) 25 MG tablet Take 2 tablets (50 mg) by mouth 2 times daily 180 tablet 3    Multiple Vitamins-Minerals (PRESERVISION AREDS 2+MULTI VIT PO) Take 1 capsule by mouth 2 times daily      omeprazole (PRILOSEC) 40 MG DR capsule Take 1 capsule (40 mg) by mouth daily. 90 capsule 3    potassium chloride ER (K-TAB) 20 MEQ CR tablet Take 1 tablet (20 mEq) by mouth daily 90 tablet 3    Psyllium (METAMUCIL PO) Take by mouth.      rosuvastatin (CRESTOR) 20 MG tablet Take 1 tablet (20 mg) by mouth daily. 90 tablet 1    sodium bicarbonate 650 MG tablet Take 1 tablet (650 mg) by mouth 2 times daily 60 tablet 11    timolol maleate (TIMOPTIC) 0.5 % ophthalmic solution INSTILL 1 DROP INTO EACH EYE ONCE DAILY IN THE MORNING      vitamin B-12 (CYANOCOBALAMIN) 500 MCG tablet Take 1 tablet by mouth daily      vitamin D3 25 mcg (1000 units) tablet Take 1 tablet (25 mcg) by mouth every other day 90 tablet 3     No current facility-administered medications for this visit.       Vitals:  BP (!) 140/82 (BP Location: Right arm, Patient Position: Sitting, Cuff Size: Adult Regular)   Pulse 84   Wt 62.1 kg (137 lb)   BMI 22.12 kg/m      Exam:  General: awake and alert, no acute distress  Skin: warm and dry, no visible rash  Heart: Reg rate and rhythm  Lungs: CTA  Extremities: no LE edema     Results:  Last Comprehensive Metabolic Panel:  Sodium   Date Value Ref Range Status   10/25/2024 146 (H) 135 - 145 mmol/L Final   08/26/2020 141 133 - 144 mmol/L Final     Potassium   Date Value Ref Range Status   10/25/2024 3.9 3.4 - 5.3 mmol/L  Final   11/21/2022 4.8 3.4 - 5.3 mmol/L Final   08/26/2020 4.4 3.4 - 5.3 mmol/L Final     Potassium POCT   Date Value Ref Range Status   08/08/2024 3.5 3.4 - 5.3 mmol/L Final     Comment:     CRITICAL RESULTS NOTED BY CCL and MD     Chloride   Date Value Ref Range Status   10/25/2024 109 (H) 98 - 107 mmol/L Final   11/21/2022 110 (H) 94 - 109 mmol/L Final   08/26/2020 111 (H) 94 - 109 mmol/L Final     Carbon Dioxide   Date Value Ref Range Status   08/26/2020 24 20 - 32 mmol/L Final     Carbon Dioxide (CO2)   Date Value Ref Range Status   10/25/2024 24 22 - 29 mmol/L Final   11/21/2022 24 20 - 32 mmol/L Final     Anion Gap   Date Value Ref Range Status   10/25/2024 13 7 - 15 mmol/L Final   11/21/2022 6 3 - 14 mmol/L Final   08/26/2020 8 3 - 14 mmol/L Final     Glucose   Date Value Ref Range Status   10/25/2024 125 (H) 70 - 99 mg/dL Final   11/21/2022 79 70 - 99 mg/dL Final   08/26/2020 84 70 - 99 mg/dL Final     GLUCOSE BY METER POCT   Date Value Ref Range Status   08/09/2024 182 (H) 70 - 99 mg/dL Final     Urea Nitrogen   Date Value Ref Range Status   10/25/2024 23.4 (H) 8.0 - 23.0 mg/dL Final   11/21/2022 35 (H) 7 - 30 mg/dL Final   08/26/2020 41 (H) 7 - 30 mg/dL Final     Creatinine   Date Value Ref Range Status   10/25/2024 1.60 (H) 0.51 - 0.95 mg/dL Final   08/26/2020 1.70 (H) 0.52 - 1.04 mg/dL Final     GFR Estimate   Date Value Ref Range Status   10/25/2024 31 (L) >60 mL/min/1.73m2 Final     Comment:     eGFR calculated using 2021 CKD-EPI equation.   08/26/2020 27 (L) >60 mL/min/[1.73_m2] Final     Comment:     Non  GFR Calc  Starting 12/18/2018, serum creatinine based estimated GFR (eGFR) will be   calculated using the Chronic Kidney Disease Epidemiology Collaboration   (CKD-EPI) equation.       Calcium   Date Value Ref Range Status   10/25/2024 9.2 8.8 - 10.4 mg/dL Final     Comment:     Reference intervals for this test were updated on 7/16/2024 to reflect our healthy population more  accurately. There may be differences in the flagging of prior results with similar values performed with this method. Those prior results can be interpreted in the context of the updated reference intervals.   08/26/2020 9.0 8.5 - 10.1 mg/dL Final       Last Basic Metabolic Panel:  Lab Results   Component Value Date     11/08/2017      Lab Results   Component Value Date    POTASSIUM 3.5 11/08/2017     Lab Results   Component Value Date    CHLORIDE 104 11/08/2017     Lab Results   Component Value Date    ALEXANDRO 8.8 11/08/2017     Lab Results   Component Value Date    CO2 26 11/08/2017     Lab Results   Component Value Date    BUN 54 11/08/2017     Lab Results   Component Value Date    CR 2.36 11/08/2017     Lab Results   Component Value Date    GLC 88 11/08/2017       Laura Llanes PA-C    Visit length 15 minutes. An additional 15 minutes were spent on date of service in chart review, documentation, and other acitivies as noted.

## 2024-10-28 NOTE — PROGRESS NOTES
Anemia Management Note - Follow Up      SUBJECTIVE/OBJECTIVE:    Referred by Dr. Marzena Martin on 2024  Primary Diagnosis: Anemia in Chronic Kidney Disease (N18.4, D63.1)     Secondary Diagnosis: Chronic Kidney Disease, Stage 4 (N18.4)   Hgb goal range: 9-10     Epo/Darbo: TBD; treat with IV Iron per clinic note on 24.   Iron regimen: Treat with IV Iron; Oral Iron stopped  *Note form 24; Recommended to stop oral iron and start IV iron due to potential for GI bleeding and difficulty in knowing if black stools are from iron or bleeding.       Labs : 2025  RX/TX plans : TBD     Recent BREANNA use, transfusion, IV iron: Venofer and PRBCs  Hx of CAD, NTEMI (), HTN, Pacemaker, AFib, Anticoagulated.     Contact: Consent to Communicate scanned on 2019.         Latest Ref Rng & Units 2024 2024 2024 8/15/2024 2024 10/8/2024 10/25/2024   Anemia   Hemoglobin 11.7 - 15.7 g/dL 8.1  8.4  8.2  8.3  9.7  10.9  10.1    TSAT 15 - 46 %     19  26  27    Ferritin 11 - 328 ng/mL     1,036  777  611         BP Readings from Last 3 Encounters:   10/08/24 (!) 160/73   10/01/24 (!) 150/73   24 (!) 160/81     Wt Readings from Last 2 Encounters:   10/08/24 66.1 kg (145 lb 11.2 oz)   24 64.4 kg (141 lb 14.4 oz)           ASSESSMENT:    Hgb:at goal - continue to monitor  TSat: not at goal (>30%) but ferritin >500ng/mL.  IV iron not indicated at this time per anemia protocol. Ferritin: At goal (>100ng/mL)        PLAN:  RTC for Anemia labs in one month.  week(s).    Orders needed to be renewed (for next follow-up date) in EPIC: None    Iron labs due:  End of 2024    Plan discussed with:  No call, chart review       NEXT FOLLOW-UP DATE:  12/3/24 Review clinic notes from  visit.     Barbara Dimas RN   Anemia Services  [facility]  [email or web address]   Office : [phone]  Fax: [phone]

## 2024-10-28 NOTE — PATIENT INSTRUCTIONS
Start lisinopril 2.5 mg daily.   Check blood pressure daily, keep log. Nurse will call in 2 weeks for report.   Check labs on 11/6 - orders are in.   Avoid ibuprofen/aleve.   Continue good hydration, low sodium diet.   Follow up with Dr. Martin in December as planned.

## 2024-10-30 ENCOUNTER — TELEPHONE (OUTPATIENT)
Dept: NEPHROLOGY | Facility: CLINIC | Age: 89
End: 2024-10-30
Payer: COMMERCIAL

## 2024-10-30 NOTE — TELEPHONE ENCOUNTER
[facility] Call Center    Phone Message    May a detailed message be left on voicemail: no     Reason for Call: Other: Patient called and stated that when she went to go  her lisinopril it was 2.5 mg instead of 5. Please call patient back to discuss.     Action Taken: Other: [facility]    Travel Screening: Not Applicable     Date of Service:

## 2024-10-31 ENCOUNTER — TELEPHONE (OUTPATIENT)
Dept: FAMILY MEDICINE | Facility: CLINIC | Age: 89
End: 2024-10-31
Payer: COMMERCIAL

## 2024-10-31 NOTE — TELEPHONE ENCOUNTER
[facility] Call Center    Phone Message    May a detailed message be left on voicemail: yes     Reason for Call: Order(s): Home Care Orders: Physical Therapy (PT): Pt discharging from PT services today

## 2024-10-31 NOTE — TELEPHONE ENCOUNTER
Called Barb.  Gave verbal orders per Dr. Gomez for PT discharge since goals are met.       Bert Quan CMA ([facility]) at 3:48 PM on 10/31/2024

## 2024-10-31 NOTE — CONFIDENTIAL NOTE
Spoke to patient to explain that the Lisinopril 2.5 mg daily is correct. Laura DEAN prescribed that at her last visit 10/28/24. Patient stated she misunderstood previously and thought it was to be 5 mg daily. Patient stated she will take the 2.5 mg daily.  CAROL Graham Care Coordinator  Nephrology

## 2024-11-06 ENCOUNTER — OFFICE VISIT (OUTPATIENT)
Dept: FAMILY MEDICINE | Facility: CLINIC | Age: 89
End: 2024-11-06
Payer: COMMERCIAL

## 2024-11-06 VITALS
BODY MASS INDEX: 23.16 KG/M2 | HEART RATE: 59 BPM | WEIGHT: 143.4 LBS | SYSTOLIC BLOOD PRESSURE: 154 MMHG | OXYGEN SATURATION: 98 % | DIASTOLIC BLOOD PRESSURE: 76 MMHG

## 2024-11-06 DIAGNOSIS — I10 ESSENTIAL HYPERTENSION: ICD-10-CM

## 2024-11-06 DIAGNOSIS — I25.118 CORONARY ARTERY DISEASE OF NATIVE ARTERY OF NATIVE HEART WITH STABLE ANGINA PECTORIS (H): ICD-10-CM

## 2024-11-06 DIAGNOSIS — F43.9 SITUATIONAL STRESS: ICD-10-CM

## 2024-11-06 DIAGNOSIS — K92.2 GASTROINTESTINAL HEMORRHAGE, UNSPECIFIED GASTROINTESTINAL HEMORRHAGE TYPE: ICD-10-CM

## 2024-11-06 DIAGNOSIS — N18.30 STAGE 3 CHRONIC KIDNEY DISEASE, UNSPECIFIED WHETHER STAGE 3A OR 3B CKD (H): Primary | ICD-10-CM

## 2024-11-06 PROCEDURE — G2211 COMPLEX E/M VISIT ADD ON: HCPCS | Performed by: FAMILY MEDICINE

## 2024-11-06 PROCEDURE — 99213 OFFICE O/P EST LOW 20 MIN: CPT | Performed by: FAMILY MEDICINE

## 2024-11-06 ASSESSMENT — ASTHMA QUESTIONNAIRES
QUESTION_2 LAST FOUR WEEKS HOW OFTEN HAVE YOU HAD SHORTNESS OF BREATH: ONCE OR TWICE A WEEK
QUESTION_5 LAST FOUR WEEKS HOW WOULD YOU RATE YOUR ASTHMA CONTROL: COMPLETELY CONTROLLED
QUESTION_3 LAST FOUR WEEKS HOW OFTEN DID YOUR ASTHMA SYMPTOMS (WHEEZING, COUGHING, SHORTNESS OF BREATH, CHEST TIGHTNESS OR PAIN) WAKE YOU UP AT NIGHT OR EARLIER THAN USUAL IN THE MORNING: NOT AT ALL
QUESTION_1 LAST FOUR WEEKS HOW MUCH OF THE TIME DID YOUR ASTHMA KEEP YOU FROM GETTING AS MUCH DONE AT WORK, SCHOOL OR AT HOME: NONE OF THE TIME
ACT_TOTALSCORE: 24
QUESTION_4 LAST FOUR WEEKS HOW OFTEN HAVE YOU USED YOUR RESCUE INHALER OR NEBULIZER MEDICATION (SUCH AS ALBUTEROL): NOT AT ALL
ACT_TOTALSCORE: 24

## 2024-11-06 NOTE — PROGRESS NOTES
Assessment & Plan     Stage 3 chronic kidney disease, unspecified whether stage 3a or 3b CKD (H)  Stable    Coronary artery disease of native artery of native heart with stable angina pectoris (H)  No sx     Gastrointestinal hemorrhage, unspecified gastrointestinal hemorrhage type  No sx, hgb stable    Situational stress  Two sons w/ severe illnesses back to back discussed    Essential hypertension  Monitor    The longitudinal plan of care for the diagnosis(es)/condition(s) as documented were addressed during this visit. Due to the added complexity in care, I will continue to support Tessa in the subsequent management and with ongoing continuity of care.  22 minutes spent by me on the date of the encounter doing chart review, history and exam, documentation and further activities per the note                No follow-ups on file.    Lashell Zarate is a 89 year old, presenting for the following health issues:  Follow Up        11/6/2024     1:46 PM   Additional Questions   Roomed by Kirsten     History of Present Illness       Reason for visit:  General checkup    She eats 4 or more servings of fruits and vegetables daily.She consumes 1 sweetened beverage(s) daily.She exercises with enough effort to increase her heart rate 9 or less minutes per day.  She exercises with enough effort to increase her heart rate 5 days per week.   She is taking medications regularly.     Here in follow-up  No cardio c/o on ROS  No GI c/o on ROS  No new or interval sx since last saw me  Past Medical History:   Diagnosis Date    CAD (coronary artery disease)     CAD s/p PCI+BMS to MRCA 10/2002, PCI to mLCX 2/2003, PCI+DESx2 to pLAD 11/2007, PCI+DESx1 to dRCA 12/2007, PCI+ALVAREZ to RPDA 7/2013; on indefinite DAPT  10/27/2002    10/27/2002: AMI s/p PCI+BMS (3.5x18mm Bx velocity) to mRCA 2/5/2003: Back pain; PCI+stent to mLCX c/b distal embolization/slow flow 4/11/2003: Unstable angina; PCI+stent (Hepacoat velocity) to mLAD 11/27/2007:  PCI+DESx2 to pLAD (IVUS w/ calcification of LMCA and ulcerated plaque pLAD, 80% dRCA; also had 80% dLCX, too small to stent) 12/11/2007: Staged PCI. PCI+DESx1 (3.5x13mm Cypher) to dRCA (indefinite DAPT recommended at this time) 6/27/2008: Angina. mLCX stent 70% ISR. LAD and RCA stents patent. Myocardium at risk from LCX felt to be small, medical management preferred to PCI. 11/10/2009: Angina. Findings unchanged from 6/27/2008, FFR LAD 0.90. Medical management recommended. 7/23/2013: Unstable angina; PCI+ALVAREZ to mPDA. Diagnostic findings: LMCA 40% distal. LAD: pLAD stents patent mLAD 30% ISR dLAD 50% diffuse, D2 diffuse disease. LCX 80% mid ISR. RCA diffuse <30% mid, RPLAs diffuse disease, RPDA 100% occlusion.      Cardiac Pacemaker- Medtronic, dual chamber- NOT dependant 11/28/2007    CKD (chronic kidney disease), stage IV (H)     Hyperlipidemia LDL goal <70     Hypertension     Stented coronary artery 10-    RCA    Stented coronary artery 2-5-2003    LCx    Stented coronary artery 4-    LAD    Stented coronary artery 11-    LAD    Stented coronary artery 12-    RCA    Transient complete heart block (H) 11/28/2007     Past Surgical History:   Procedure Laterality Date    CHOLECYSTECTOMY      CV CORONARY ANGIOGRAM N/A 6/17/2022    Procedure: Coronary Angiogram;  Surgeon: Constantine Macias MD;  Location: [facility] CARDIAC CATH LAB    CV CORONARY ANGIOGRAM N/A 7/17/2023    Procedure: Coronary Angiogram;  Surgeon: Constantine Macias MD;  Location: [facility] CARDIAC CATH LAB    CV LEFT ATRIAL APPENDAGE CLOSURE N/A 8/8/2024    Procedure: Left Atrial Appendage Closure;  Surgeon: Rodrick Garcia MD;  Location: [facility] CARDIAC CATH LAB    CV PCI N/A 6/17/2022    Procedure: Percutaneous Coronary Intervention;  Surgeon: Constantine Macias MD;  Location: [facility] CARDIAC CATH LAB    CV PCI N/A 7/17/2023    Procedure: Percutaneous Coronary Intervention;  Surgeon: Constantine Macias  MD;  Location:  HEART CARDIAC CATH LAB    CV PCI N/A 11/6/2023    Procedure: Percutaneous Coronary Intervention;  Surgeon: Constantien Macias MD;  Location:  HEART CARDIAC CATH LAB    CV PERICARDIOCENTESIS N/A 8/8/2024    Procedure: Pericardiocentesis;  Surgeon: Rodrick Garcia MD;  Location:  HEART CARDIAC CATH LAB    CV PERICARDIOCENTESIS N/A 8/11/2024    Procedure: Pericardial Drain Adjustment, Possible New Pericardial Drain Placement;  Surgeon: Rodrick Garcia MD;  Location:  HEART CARDIAC CATH LAB    CV RIGHT HEART CATH MEASUREMENTS RECORDED N/A 6/17/2022    Procedure: Right Heart Catheterization;  Surgeon: Constantine Macias MD;  Location:  HEART CARDIAC CATH LAB    EP PACEMAKER N/A 10/15/2019    Procedure: EP PACEMAKER;  Surgeon: Anthony Zhu MD;  Location: [facility] CARDIAC CATH LAB    ESOPHAGOSCOPY, GASTROSCOPY, DUODENOSCOPY (EGD), COMBINED N/A 7/20/2023    Procedure: Esophagoscopy, gastroscopy, duodenoscopy (EGD), combined;  Surgeon: Bekah Dash DO;  Location:  GI    ESOPHAGOSCOPY, GASTROSCOPY, DUODENOSCOPY (EGD), COMBINED N/A 5/2/2024    Procedure: Esophagoscopy, gastroscopy, duodenoscopy (EGD), combined;  Surgeon: Tavo Donaldson MD;  Location:  GI    HC PPM INSERTION NEW/REPLACEMENT W/ ATRIAL&VENTRICULAR LEAD  11-    HYSTERECTOMY      LAPAROTOMY EXPLORATORY N/A 4/13/2024    Procedure: Laparotomy exploratory, Wound Vac Placement.;  Surgeon: Anthony Trammell MD;  Location: [facility] OR    LAPAROTOMY EXPLORATORY N/A 4/14/2024    Procedure: Abdominal Re-Exploration and Closure;  Surgeon: Anthony Trammell MD;  Location: [facility] OR    LAPAROTOMY EXPLORATORY N/A 4/13/2024    Procedure: Exploratory Laparotomy;  Surgeon: Anthony Trammell MD;  Location: [facility] OR     Current Outpatient Medications   Medication Sig Dispense Refill    acetaminophen (TYLENOL) 325 MG tablet Take 975 mg by mouth nightly as needed for pain      allopurinol (ZYLOPRIM) 100 MG  tablet Take 1 tablet (100 mg) by mouth daily 90 tablet 0    amLODIPine (NORVASC) 5 MG tablet Take 1 tablet (5 mg) by mouth daily. 90 tablet 3    aspirin 81 MG EC tablet Take 81 mg by mouth daily.      BUDESONIDE IN Inhale into the lungs.      clopidogrel (PLAVIX) 75 MG tablet Take 1 tablet (75 mg) by mouth daily 90 tablet 1    fluticasone-salmeterol (ADVAIR) 250-50 MCG/ACT inhaler INHALE 1 DOSE BY MOUTH TWICE DAILY 60 each 8    furosemide (LASIX) 40 MG tablet Take 1 tablet (40 mg) by mouth daily May take an additional 20 mg at noon as needed for swelling. 90 tablet 2    isosorbide mononitrate (IMDUR) 30 MG 24 hr tablet Take 3 tablets (90 mg) by mouth daily 90 tablet 11    lisinopril (ZESTRIL) 2.5 MG tablet Take 1 tablet (2.5 mg) by mouth daily. 90 tablet 3    methocarbamol (ROBAXIN) 500 MG tablet Take 1 tablet (500 mg) by mouth every 6 hours as needed for muscle spasms 30 tablet 0    metoprolol tartrate (LOPRESSOR) 25 MG tablet Take 2 tablets (50 mg) by mouth 2 times daily 180 tablet 3    Multiple Vitamins-Minerals (PRESERVISION AREDS 2+MULTI VIT PO) Take 1 capsule by mouth 2 times daily      omeprazole (PRILOSEC) 40 MG DR capsule Take 1 capsule (40 mg) by mouth daily. 90 capsule 3    potassium chloride ER (K-TAB) 20 MEQ CR tablet Take 1 tablet (20 mEq) by mouth daily 90 tablet 3    Psyllium (METAMUCIL PO) Take by mouth.      rosuvastatin (CRESTOR) 20 MG tablet Take 1 tablet (20 mg) by mouth daily. 90 tablet 1    sodium bicarbonate 650 MG tablet Take 1 tablet (650 mg) by mouth 2 times daily 60 tablet 11    timolol maleate (TIMOPTIC) 0.5 % ophthalmic solution INSTILL 1 DROP INTO EACH EYE ONCE DAILY IN THE MORNING      vitamin B-12 (CYANOCOBALAMIN) 500 MCG tablet Take 1 tablet by mouth daily      vitamin D3 25 mcg (1000 units) tablet Take 1 tablet (25 mcg) by mouth every other day 90 tablet 3     No current facility-administered medications for this visit.     Allergies   Allergen Reactions    Codeine Sulfate Itching  "   Shrimp Swelling    Oxycodone Other (See Comments)    Shrimp Extract Swelling    Bactrim [Sulfamethoxazole W/Trimethoprim]      Patient unable to recall    Biaxin [Clarithromycin]     Chlorthalidone Nausea and Vomiting    Clonidine     Darvon [Propoxyphene Hcl]     Dilaudid [Hydromorphone] Visual Disturbance and Hallucination     Tolerated in April 2024 hospital admissions    Gabapentin Fatigue and Confusion     \"felt drunk\"    Indomethacin     Levaquin [Levofloxacin Hemihydrate]     Morphine Sulfate     Percocet [Oxycodone-Acetaminophen] Hallucination    Pregabalin Itching and Fatigue    Simvastatin Palpitations     Muscle weakness, leg cramping      Spironolactone      Dehydrated      Terazosin      Family History   Problem Relation Age of Onset    Cancer Sister 82        bladder cancer     Social History     Socioeconomic History    Marital status:      Spouse name: Not on file    Number of children: 4    Years of education: Not on file    Highest education level: Not on file   Occupational History     Employer: [employer]   Tobacco Use    Smoking status: Former     Current packs/day: 0.30     Average packs/day: 0.3 packs/day for 15.0 years (4.5 ttl pk-yrs)     Types: Cigarettes    Smokeless tobacco: Never    Tobacco comments:     Quit 35+ years ago   Vaping Use    Vaping status: Never Used   Substance and Sexual Activity    Alcohol use: Not on file    Drug use: No    Sexual activity: Not Currently     Partners: Male     Birth control/protection: Post-menopausal   Other Topics Concern     Service Not Asked    Blood Transfusions Yes    Caffeine Concern Not Asked    Occupational Exposure Not Asked    Hobby Hazards Not Asked    Sleep Concern Not Asked    Stress Concern Not Asked    Weight Concern Not Asked    Special Diet Not Asked    Back Care Not Asked    Exercise No    Bike Helmet Not Asked    Seat Belt Not Asked    Self-Exams Not Asked    Parent/sibling w/ CABG, MI or angioplasty " before 65F 55M? Not Asked   Social History Narrative    Not on file     Social Drivers of Health     Financial Resource Strain: Low Risk  (11/15/2023)    Financial Resource Strain     Within the past 12 months, have you or your family members you live with been unable to get utilities (heat, electricity) when it was really needed?: No   Food Insecurity: Low Risk  (11/15/2023)    Food Insecurity     Within the past 12 months, did you worry that your food would run out before you got money to buy more?: No     Within the past 12 months, did the food you bought just not last and you didn t have money to get more?: No   Transportation Needs: Low Risk  (11/15/2023)    Transportation Needs     Within the past 12 months, has lack of transportation kept you from medical appointments, getting your medicines, non-medical meetings or appointments, work, or from getting things that you need?: No   Physical Activity: Not on file   Stress: Not on file   Social Connections: Not on file   Interpersonal Safety: Low Risk  (10/8/2024)    Interpersonal Safety     Do you feel physically and emotionally safe where you currently live?: Yes     Within the past 12 months, have you been hit, slapped, kicked or otherwise physically hurt by someone?: No     Within the past 12 months, have you been humiliated or emotionally abused in other ways by your partner or ex-partner?: No   Housing Stability: Low Risk  (11/15/2023)    Housing Stability     Do you have housing? : Yes     Are you worried about losing your housing?: No         Objective    BP (!) 154/76 (BP Location: Right arm, Patient Position: Sitting, Cuff Size: Adult Regular)   Pulse 59   Wt 65 kg (143 lb 6.4 oz)   SpO2 98%   BMI 23.16 kg/m    Body mass index is 23.16 kg/m .  Physical Exam               Signed Electronically by: Pedro Gomez MD

## 2024-11-07 ENCOUNTER — HOSPITAL ENCOUNTER (EMERGENCY)
Facility: CLINIC | Age: 89
Discharge: HOME | End: 2024-11-07
Attending: INTERNAL MEDICINE | Admitting: INTERNAL MEDICINE
Payer: COMMERCIAL

## 2024-11-07 ENCOUNTER — APPOINTMENT (OUTPATIENT)
Dept: GENERAL RADIOLOGY | Facility: CLINIC | Age: 89
End: 2024-11-07
Attending: INTERNAL MEDICINE
Payer: COMMERCIAL

## 2024-11-07 VITALS
OXYGEN SATURATION: 93 % | DIASTOLIC BLOOD PRESSURE: 80 MMHG | SYSTOLIC BLOOD PRESSURE: 156 MMHG | TEMPERATURE: 97.9 F | RESPIRATION RATE: 18 BRPM | HEART RATE: 59 BPM

## 2024-11-07 DIAGNOSIS — M10.00 IDIOPATHIC GOUT, UNSPECIFIED CHRONICITY, UNSPECIFIED SITE: ICD-10-CM

## 2024-11-07 DIAGNOSIS — Z79.02 PLATELET INHIBITION DUE TO PLAVIX: ICD-10-CM

## 2024-11-07 DIAGNOSIS — R04.2 HEMOPTYSIS: ICD-10-CM

## 2024-11-07 LAB
ABO/RH(D): NORMAL
ALBUMIN SERPL BCG-MCNC: 4 G/DL (ref 3.5–5.2)
ALP SERPL-CCNC: 122 U/L (ref 40–150)
ALT SERPL W P-5'-P-CCNC: 22 U/L (ref 0–50)
ANION GAP SERPL CALCULATED.3IONS-SCNC: 14 MMOL/L (ref 7–15)
ANTIBODY SCREEN: NEGATIVE
AST SERPL W P-5'-P-CCNC: 31 U/L (ref 0–45)
BASOPHILS # BLD AUTO: 0.1 10E3/UL (ref 0–0.2)
BASOPHILS NFR BLD AUTO: 1 %
BILIRUB SERPL-MCNC: 0.6 MG/DL
BUN SERPL-MCNC: 36 MG/DL (ref 8–23)
CALCIUM SERPL-MCNC: 9.4 MG/DL (ref 8.8–10.4)
CHLORIDE SERPL-SCNC: 108 MMOL/L (ref 98–107)
CREAT SERPL-MCNC: 1.74 MG/DL (ref 0.51–0.95)
CRP SERPL-MCNC: 8.06 MG/L
EGFRCR SERPLBLD CKD-EPI 2021: 28 ML/MIN/1.73M2
EOSINOPHIL # BLD AUTO: 0.1 10E3/UL (ref 0–0.7)
EOSINOPHIL NFR BLD AUTO: 1 %
ERYTHROCYTE [DISTWIDTH] IN BLOOD BY AUTOMATED COUNT: 16.3 % (ref 10–15)
ERYTHROCYTE [SEDIMENTATION RATE] IN BLOOD BY WESTERGREN METHOD: 35 MM/HR (ref 0–30)
GLUCOSE SERPL-MCNC: 84 MG/DL (ref 70–99)
HCO3 SERPL-SCNC: 25 MMOL/L (ref 22–29)
HCT VFR BLD AUTO: 34.2 % (ref 35–47)
HGB BLD-MCNC: 10.7 G/DL (ref 11.7–15.7)
IMM GRANULOCYTES # BLD: 0 10E3/UL
IMM GRANULOCYTES NFR BLD: 0 %
INR PPP: 1.17 (ref 0.85–1.15)
LYMPHOCYTES # BLD AUTO: 1.7 10E3/UL (ref 0.8–5.3)
LYMPHOCYTES NFR BLD AUTO: 24 %
MCH RBC QN AUTO: 29.6 PG (ref 26.5–33)
MCHC RBC AUTO-ENTMCNC: 31.3 G/DL (ref 31.5–36.5)
MCV RBC AUTO: 95 FL (ref 78–100)
MONOCYTES # BLD AUTO: 1.8 10E3/UL (ref 0–1.3)
MONOCYTES NFR BLD AUTO: 25 %
NEUTROPHILS # BLD AUTO: 3.6 10E3/UL (ref 1.6–8.3)
NEUTROPHILS NFR BLD AUTO: 49 %
NRBC # BLD AUTO: 0 10E3/UL
NRBC BLD AUTO-RTO: 0 /100
NT-PROBNP SERPL-MCNC: 3345 PG/ML (ref 0–1800)
PLAT MORPH BLD: ABNORMAL
PLATELET # BLD AUTO: 88 10E3/UL (ref 150–450)
POLYCHROMASIA BLD QL SMEAR: SLIGHT
POTASSIUM SERPL-SCNC: 3.6 MMOL/L (ref 3.4–5.3)
PROCALCITONIN SERPL IA-MCNC: 0.16 NG/ML
PROT SERPL-MCNC: 7.3 G/DL (ref 6.4–8.3)
RBC # BLD AUTO: 3.62 10E6/UL (ref 3.8–5.2)
RBC MORPH BLD: ABNORMAL
SODIUM SERPL-SCNC: 147 MMOL/L (ref 135–145)
SPECIMEN EXPIRATION DATE: NORMAL
TROPONIN T SERPL HS-MCNC: 35 NG/L
VARIANT LYMPHS BLD QL SMEAR: PRESENT
WBC # BLD AUTO: 7.4 10E3/UL (ref 4–11)

## 2024-11-07 PROCEDURE — 36415 COLL VENOUS BLD VENIPUNCTURE: CPT | Performed by: INTERNAL MEDICINE

## 2024-11-07 PROCEDURE — 86901 BLOOD TYPING SEROLOGIC RH(D): CPT | Performed by: INTERNAL MEDICINE

## 2024-11-07 PROCEDURE — 86140 C-REACTIVE PROTEIN: CPT | Performed by: INTERNAL MEDICINE

## 2024-11-07 PROCEDURE — 86900 BLOOD TYPING SEROLOGIC ABO: CPT | Performed by: INTERNAL MEDICINE

## 2024-11-07 PROCEDURE — 85025 COMPLETE CBC W/AUTO DIFF WBC: CPT | Performed by: INTERNAL MEDICINE

## 2024-11-07 PROCEDURE — 84145 PROCALCITONIN (PCT): CPT | Performed by: INTERNAL MEDICINE

## 2024-11-07 PROCEDURE — 71046 X-RAY EXAM CHEST 2 VIEWS: CPT

## 2024-11-07 PROCEDURE — 99285 EMERGENCY DEPT VISIT HI MDM: CPT | Mod: 25 | Performed by: INTERNAL MEDICINE

## 2024-11-07 PROCEDURE — 82040 ASSAY OF SERUM ALBUMIN: CPT | Performed by: INTERNAL MEDICINE

## 2024-11-07 PROCEDURE — 85652 RBC SED RATE AUTOMATED: CPT | Performed by: INTERNAL MEDICINE

## 2024-11-07 PROCEDURE — 84484 ASSAY OF TROPONIN QUANT: CPT | Performed by: INTERNAL MEDICINE

## 2024-11-07 PROCEDURE — 99284 EMERGENCY DEPT VISIT MOD MDM: CPT | Performed by: INTERNAL MEDICINE

## 2024-11-07 PROCEDURE — 71046 X-RAY EXAM CHEST 2 VIEWS: CPT | Mod: 26 | Performed by: RADIOLOGY

## 2024-11-07 PROCEDURE — 83880 ASSAY OF NATRIURETIC PEPTIDE: CPT | Performed by: INTERNAL MEDICINE

## 2024-11-07 PROCEDURE — 85610 PROTHROMBIN TIME: CPT | Performed by: INTERNAL MEDICINE

## 2024-11-07 ASSESSMENT — ACTIVITIES OF DAILY LIVING (ADL)
ADLS_ACUITY_SCORE: 0

## 2024-11-07 ASSESSMENT — COLUMBIA-SUICIDE SEVERITY RATING SCALE - C-SSRS
6. HAVE YOU EVER DONE ANYTHING, STARTED TO DO ANYTHING, OR PREPARED TO DO ANYTHING TO END YOUR LIFE?: NO
2. HAVE YOU ACTUALLY HAD ANY THOUGHTS OF KILLING YOURSELF IN THE PAST MONTH?: NO
1. IN THE PAST MONTH, HAVE YOU WISHED YOU WERE DEAD OR WISHED YOU COULD GO TO SLEEP AND NOT WAKE UP?: NO

## 2024-11-07 NOTE — ED TRIAGE NOTES
Pt ambulatory to triage with c/o hematemesis. Pt states that she woke up at 0500, and noticed bloody sputum in her mouth. Pt states she is on plavix for her cardiac stents. Pt denies abdominal pain, chest pain, nausea, SOB, or any other symptoms at this time.      Triage Assessment (Adult)       Row Name 11/07/24 0728          Triage Assessment    Airway WDL WDL        Respiratory WDL    Respiratory WDL WDL        Skin Circulation/Temperature WDL    Skin Circulation/Temperature WDL WDL        Cardiac WDL    Cardiac WDL WDL        Peripheral/Neurovascular WDL    Peripheral Neurovascular WDL WDL        Cognitive/Neuro/Behavioral WDL    Cognitive/Neuro/Behavioral WDL WDL

## 2024-11-07 NOTE — ED PROVIDER NOTES
ED Provider Note  [facility]      History     Chief Complaint   Patient presents with    Hematemesis     The history is provided by medical records and the patient.     Tessa Mansfield is a 89 year old female with history of HTN, CKD4, CAD s/p multiple stents, and Afib s/p Watchman on 8/4/24 anticoagulated on Plavix who presents to the ED for hematemesis. Patient reports that she woke up to go to the bathroom at 5 am at which time she began vomiting. She describes this more as spitting up than vomiting. She did notice some blood clots in the sputum. She has had 4-5 episodes in the last few hours. She has never had this before. She denies any pain, shortness of breath, diarrhea or dark stools, weakness, lightheadedness, or dizziness. Currently, she feels fine and is not actively spitting up.     Past Medical History  Past Medical History:   Diagnosis Date    CAD (coronary artery disease)     CAD s/p PCI+BMS to MRCA 10/2002, PCI to mLCX 2/2003, PCI+DESx2 to pLAD 11/2007, PCI+DESx1 to dRCA 12/2007, PCI+ALVAREZ to RPDA 7/2013; on indefinite DAPT  10/27/2002    10/27/2002: AMI s/p PCI+BMS (3.5x18mm Bx velocity) to mRCA 2/5/2003: Back pain; PCI+stent to mLCX c/b distal embolization/slow flow 4/11/2003: Unstable angina; PCI+stent (Hepacoat velocity) to mLAD 11/27/2007: PCI+DESx2 to pLAD (IVUS w/ calcification of LMCA and ulcerated plaque pLAD, 80% dRCA; also had 80% dLCX, too small to stent) 12/11/2007: Staged PCI. PCI+DESx1 (3.5x13mm Cypher) to dRCA (indefinite DAPT recommended at this time) 6/27/2008: Angina. mLCX stent 70% ISR. LAD and RCA stents patent. Myocardium at risk from LCX felt to be small, medical management preferred to PCI. 11/10/2009: Angina. Findings unchanged from 6/27/2008, FFR LAD 0.90. Medical management recommended. 7/23/2013: Unstable angina; PCI+ALVAREZ to mPDA. Diagnostic findings: LMCA 40% distal. LAD: pLAD stents patent mLAD 30% ISR dLAD 50% diffuse, D2 diffuse disease. LCX  80% mid ISR. RCA diffuse <30% mid, RPLAs diffuse disease, RPDA 100% occlusion.      Cardiac Pacemaker- Medtronic, dual chamber- NOT dependant 11/28/2007    CKD (chronic kidney disease), stage IV (H)     Hyperlipidemia LDL goal <70     Hypertension     Stented coronary artery 10-    RCA    Stented coronary artery 2-5-2003    LCx    Stented coronary artery 4-    LAD    Stented coronary artery 11-    LAD    Stented coronary artery 12-    RCA    Transient complete heart block (H) 11/28/2007     Past Surgical History:   Procedure Laterality Date    CHOLECYSTECTOMY      CV CORONARY ANGIOGRAM N/A 6/17/2022    Procedure: Coronary Angiogram;  Surgeon: Constantine Macias MD;  Location:  HEART CARDIAC CATH LAB    CV CORONARY ANGIOGRAM N/A 7/17/2023    Procedure: Coronary Angiogram;  Surgeon: Constantine Macias MD;  Location: [facility] CARDIAC CATH LAB    CV LEFT ATRIAL APPENDAGE CLOSURE N/A 8/8/2024    Procedure: Left Atrial Appendage Closure;  Surgeon: Rodrick Garcia MD;  Location:  HEART CARDIAC CATH LAB    CV PCI N/A 6/17/2022    Procedure: Percutaneous Coronary Intervention;  Surgeon: Constantine Macias MD;  Location:  HEART CARDIAC CATH LAB    CV PCI N/A 7/17/2023    Procedure: Percutaneous Coronary Intervention;  Surgeon: Constantine Macias MD;  Location:  HEART CARDIAC CATH LAB    CV PCI N/A 11/6/2023    Procedure: Percutaneous Coronary Intervention;  Surgeon: Constantine Macias MD;  Location:  HEART CARDIAC CATH LAB    CV PERICARDIOCENTESIS N/A 8/8/2024    Procedure: Pericardiocentesis;  Surgeon: Rodrick Garcia MD;  Location:  HEART CARDIAC CATH LAB    CV PERICARDIOCENTESIS N/A 8/11/2024    Procedure: Pericardial Drain Adjustment, Possible New Pericardial Drain Placement;  Surgeon: Rodrick Garcia MD;  Location: [facility] CARDIAC CATH LAB    CV RIGHT HEART CATH MEASUREMENTS RECORDED N/A 6/17/2022    Procedure: Right Heart Catheterization;  Surgeon:  Constantine Macias MD;  Location:  HEART CARDIAC CATH LAB    EP PACEMAKER N/A 10/15/2019    Procedure: EP PACEMAKER;  Surgeon: Anthony Zhu MD;  Location:  HEART CARDIAC CATH LAB    ESOPHAGOSCOPY, GASTROSCOPY, DUODENOSCOPY (EGD), COMBINED N/A 7/20/2023    Procedure: Esophagoscopy, gastroscopy, duodenoscopy (EGD), combined;  Surgeon: Bekah Dash DO;  [facility]: [facility]    ESOPHAGOSCOPY, GASTROSCOPY, DUODENOSCOPY (EGD), COMBINED N/A 5/2/2024    Procedure: Esophagoscopy, gastroscopy, duodenoscopy (EGD), combined;  Surgeon: Tavo Donaldson MD;  Location:  GI    HC PPM INSERTION NEW/REPLACEMENT W/ ATRIAL&VENTRICULAR LEAD  11-    HYSTERECTOMY      LAPAROTOMY EXPLORATORY N/A 4/13/2024    Procedure: Laparotomy exploratory, Wound Vac Placement.;  Surgeon: Anthony Trammell MD;  [facility]    LAPAROTOMY EXPLORATORY N/A 4/14/2024    Procedure: Abdominal Re-Exploration and Closure;  Surgeon: Anthony Trammell MD;  [facility]    LAPAROTOMY EXPLORATORY N/A 4/13/2024    Procedure: Exploratory Laparotomy;  Surgeon: Anthony Trammell MD;  [facility]     acetaminophen (TYLENOL) 325 MG tablet  allopurinol (ZYLOPRIM) 100 MG tablet  amLODIPine (NORVASC) 5 MG tablet  aspirin 81 MG EC tablet  BUDESONIDE IN  clopidogrel (PLAVIX) 75 MG tablet  fluticasone-salmeterol (ADVAIR) 250-50 MCG/ACT inhaler  furosemide (LASIX) 40 MG tablet  isosorbide mononitrate (IMDUR) 30 MG 24 hr tablet  lisinopril (ZESTRIL) 2.5 MG tablet  methocarbamol (ROBAXIN) 500 MG tablet  metoprolol tartrate (LOPRESSOR) 25 MG tablet  Multiple Vitamins-Minerals (PRESERVISION AREDS 2+MULTI VIT PO)  omeprazole (PRILOSEC) 40 MG DR capsule  potassium chloride ER (K-TAB) 20 MEQ CR tablet  Psyllium (METAMUCIL PO)  rosuvastatin (CRESTOR) 20 MG tablet  sodium bicarbonate 650 MG tablet  timolol maleate (TIMOPTIC) 0.5 % ophthalmic solution  vitamin B-12 (CYANOCOBALAMIN) 500 MCG tablet  vitamin D3 25 mcg (1000 units)  "tablet      Allergies   Allergen Reactions    Codeine Sulfate Itching    Shrimp Swelling    Oxycodone Other (See Comments)    Shrimp Extract Swelling    Bactrim [Sulfamethoxazole W/Trimethoprim]      Patient unable to recall    Biaxin [Clarithromycin]     Chlorthalidone Nausea and Vomiting    Clonidine     Darvon [Propoxyphene Hcl]     Dilaudid [Hydromorphone] Visual Disturbance and Hallucination     Tolerated in April 2024 hospital admissions    Gabapentin Fatigue and Confusion     \"felt drunk\"    Indomethacin     Levaquin [Levofloxacin Hemihydrate]     Morphine Sulfate     Percocet [Oxycodone-Acetaminophen] Hallucination    Pregabalin Itching and Fatigue    Simvastatin Palpitations     Muscle weakness, leg cramping      Spironolactone      Dehydrated      Terazosin      Family History  Family History   Problem Relation Age of Onset    Cancer Sister 82        bladder cancer     Social History   Social History     Tobacco Use    Smoking status: Former     Current packs/day: 0.30     Average packs/day: 0.3 packs/day for 15.0 years (4.5 ttl pk-yrs)     Types: Cigarettes    Smokeless tobacco: Never    Tobacco comments:     Quit 35+ years ago   Vaping Use    Vaping status: Never Used   Substance Use Topics    Drug use: No      A complete review of systems was performed with pertinent positives and negatives noted in the HPI, and all other systems negative.    Physical Exam   BP: (!) 172/75  Pulse: 90  Temp: 97.9  F (36.6  C)  Resp: 15  SpO2: 98 %  Physical Exam  Vitals and nursing note reviewed.   Constitutional:       General: She is not in acute distress.     Appearance: Normal appearance. She is not diaphoretic.   HENT:      Head: Normocephalic and atraumatic.      Mouth/Throat:      Mouth: Mucous membranes are moist.   Eyes:      General: No scleral icterus.     Extraocular Movements: Extraocular movements intact.      Conjunctiva/sclera: Conjunctivae normal.   Cardiovascular:      Rate and Rhythm: Normal rate and " regular rhythm.      Heart sounds: Normal heart sounds. No murmur heard.     No friction rub. No gallop.   Pulmonary:      Effort: Pulmonary effort is normal. No respiratory distress.      Breath sounds: Normal breath sounds. No stridor. No wheezing, rhonchi or rales.   Chest:      Chest wall: No tenderness.   Abdominal:      General: Abdomen is flat. Bowel sounds are normal. There is no distension.      Palpations: Abdomen is soft. There is no mass.      Tenderness: There is no abdominal tenderness. There is no right CVA tenderness, left CVA tenderness, guarding or rebound.      Hernia: No hernia is present.   Musculoskeletal:         General: No tenderness. Normal range of motion.      Cervical back: Normal range of motion and neck supple.   Skin:     General: Skin is warm.      Findings: No rash.   Neurological:      General: No focal deficit present.      Mental Status: She is alert.      Cranial Nerves: No cranial nerve deficit.      Motor: No weakness.           ED Course, Procedures, & Data      Procedures            Results for orders placed or performed during the hospital encounter of 11/07/24   XR Chest 2 Views     Status: None (Preliminary result)    Impression    RESIDENT PRELIMINARY INTERPRETATION  IMPRESSION:   No acute airspace disease. No pneumothorax, no focal airspace opacity.   INR     Status: Abnormal   Result Value Ref Range    INR 1.17 (H) 0.85 - 1.15   Comprehensive metabolic panel     Status: Abnormal   Result Value Ref Range    Sodium 147 (H) 135 - 145 mmol/L    Potassium 3.6 3.4 - 5.3 mmol/L    Carbon Dioxide (CO2) 25 22 - 29 mmol/L    Anion Gap 14 7 - 15 mmol/L    Urea Nitrogen 36.0 (H) 8.0 - 23.0 mg/dL    Creatinine 1.74 (H) 0.51 - 0.95 mg/dL    GFR Estimate 28 (L) >60 mL/min/1.73m2    Calcium 9.4 8.8 - 10.4 mg/dL    Chloride 108 (H) 98 - 107 mmol/L    Glucose 84 70 - 99 mg/dL    Alkaline Phosphatase 122 40 - 150 U/L    AST 31 0 - 45 U/L    ALT 22 0 - 50 U/L    Protein Total 7.3 6.4 -  8.3 g/dL    Albumin 4.0 3.5 - 5.2 g/dL    Bilirubin Total 0.6 <=1.2 mg/dL   Troponin T, High Sensitivity     Status: Abnormal   Result Value Ref Range    Troponin T, High Sensitivity 35 (H) <=14 ng/L   Procalcitonin     Status: Normal   Result Value Ref Range    Procalcitonin 0.16 <0.50 ng/mL   Nt probnp inpatient (BNP)     Status: Abnormal   Result Value Ref Range    N terminal Pro BNP Inpatient 3,345 (H) 0 - 1,800 pg/mL   CRP inflammation     Status: Abnormal   Result Value Ref Range    CRP Inflammation 8.06 (H) <5.00 mg/L   Erythrocyte sedimentation rate auto     Status: Abnormal   Result Value Ref Range    Erythrocyte Sedimentation Rate 35 (H) 0 - 30 mm/hr   CBC with platelets and differential     Status: Abnormal   Result Value Ref Range    WBC Count 7.4 4.0 - 11.0 10e3/uL    RBC Count 3.62 (L) 3.80 - 5.20 10e6/uL    Hemoglobin 10.7 (L) 11.7 - 15.7 g/dL    Hematocrit 34.2 (L) 35.0 - 47.0 %    MCV 95 78 - 100 fL    MCH 29.6 26.5 - 33.0 pg    MCHC 31.3 (L) 31.5 - 36.5 g/dL    RDW 16.3 (H) 10.0 - 15.0 %    Platelet Count 88 (L) 150 - 450 10e3/uL    % Neutrophils 49 %    % Lymphocytes 24 %    % Monocytes 25 %    % Eosinophils 1 %    % Basophils 1 %    % Immature Granulocytes 0 %    NRBCs per 100 WBC 0 <1 /100    Absolute Neutrophils 3.6 1.6 - 8.3 10e3/uL    Absolute Lymphocytes 1.7 0.8 - 5.3 10e3/uL    Absolute Monocytes 1.8 (H) 0.0 - 1.3 10e3/uL    Absolute Eosinophils 0.1 0.0 - 0.7 10e3/uL    Absolute Basophils 0.1 0.0 - 0.2 10e3/uL    Absolute Immature Granulocytes 0.0 <=0.4 10e3/uL    Absolute NRBCs 0.0 10e3/uL   RBC and Platelet Morphology     Status: Abnormal   Result Value Ref Range    RBC Morphology Confirmed RBC Indices     Platelet Assessment  Automated Count Confirmed. Platelet morphology is normal.     Automated Count Confirmed. Platelet morphology is normal.    Polychromasia Slight (A) None Seen    Reactive Lymphocytes Present (A) None Seen   EKG 12-lead, tracing only     Status: None (Preliminary  result)   Result Value Ref Range    Systolic Blood Pressure  mmHg    Diastolic Blood Pressure  mmHg    Ventricular Rate 65 BPM    Atrial Rate 65 BPM    DC Interval 244 ms    QRS Duration 78 ms     ms    QTc 434 ms    P Axis 98 degrees    R AXIS 67 degrees    T Axis 94 degrees    Interpretation ECG       Atrial-paced rhythm with prolonged AV conduction  Low voltage QRS  Septal infarct , age undetermined  Abnormal ECG     Adult Type and Screen     Status: None   Result Value Ref Range    ABO/RH(D) O NEG     Antibody Screen Negative Negative    SPECIMEN EXPIRATION DATE [id number]    CBC with platelets differential     Status: Abnormal    Narrative    The following orders were created for panel order CBC with platelets differential.  Procedure                               Abnormality         Status                     ---------                               -----------         ------                     CBC with platelets and d...[[id number]]  Abnormal            Final result               RBC and Platelet Morphology[[id number]]  Abnormal            Final result                 Please view results for these tests on the individual orders.   ABO/Rh type and screen     Status: None    Narrative    The following orders were created for panel order ABO/Rh type and screen.  Procedure                               Abnormality         Status                     ---------                               -----------         ------                     Adult Type and Screen[[id number]]                            Final result                 Please view results for these tests on the individual orders.     Medications - No data to display  Labs Ordered and Resulted from Time of ED Arrival to Time of ED Departure   INR - Abnormal       Result Value    INR 1.17 (*)    COMPREHENSIVE METABOLIC PANEL - Abnormal    Sodium 147 (*)     Potassium 3.6      Carbon Dioxide (CO2) 25      Anion Gap 14      Urea Nitrogen 36.0 (*)      Creatinine 1.74 (*)     GFR Estimate 28 (*)     Calcium 9.4      Chloride 108 (*)     Glucose 84      Alkaline Phosphatase 122      AST 31      ALT 22      Protein Total 7.3      Albumin 4.0      Bilirubin Total 0.6     TROPONIN T, HIGH SENSITIVITY - Abnormal    Troponin T, High Sensitivity 35 (*)    NT PROBNP INPATIENT - Abnormal    N terminal Pro BNP Inpatient 3,345 (*)    CRP INFLAMMATION - Abnormal    CRP Inflammation 8.06 (*)    ERYTHROCYTE SEDIMENTATION RATE AUTO - Abnormal    Erythrocyte Sedimentation Rate 35 (*)    CBC WITH PLATELETS AND DIFFERENTIAL - Abnormal    WBC Count 7.4      RBC Count 3.62 (*)     Hemoglobin 10.7 (*)     Hematocrit 34.2 (*)     MCV 95      MCH 29.6      MCHC 31.3 (*)     RDW 16.3 (*)     Platelet Count 88 (*)     % Neutrophils 49      % Lymphocytes 24      % Monocytes 25      % Eosinophils 1      % Basophils 1      % Immature Granulocytes 0      NRBCs per 100 WBC 0      Absolute Neutrophils 3.6      Absolute Lymphocytes 1.7      Absolute Monocytes 1.8 (*)     Absolute Eosinophils 0.1      Absolute Basophils 0.1      Absolute Immature Granulocytes 0.0      Absolute NRBCs 0.0     RBC AND PLATELET MORPHOLOGY - Abnormal    RBC Morphology Confirmed RBC Indices      Platelet Assessment        Value: Automated Count Confirmed. Platelet morphology is normal.    Polychromasia Slight (*)     Reactive Lymphocytes Present (*)    PROCALCITONIN - Normal    Procalcitonin 0.16     TYPE AND SCREEN, ADULT    ABO/RH(D) O NEG      Antibody Screen Negative      SPECIMEN EXPIRATION DATE [id number]     ABO/RH TYPE AND SCREEN     XR Chest 2 Views   Preliminary Result   RESIDENT PRELIMINARY INTERPRETATION   IMPRESSION:    No acute airspace disease. No pneumothorax, no focal airspace opacity.             Critical care was not performed.     Medical Decision Making  The patient's presentation was of high complexity (an acute health issue posing potential threat to life or bodily function).    The patient's  evaluation involved:  ordering and/or review of 3+ test(s) in this encounter (see separate area of note for details)    The patient's management necessitated only low risk treatment.    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    I have reviewed the nursing notes. I have reviewed the findings, diagnosis, plan and need for follow up with the patient.    Discharge Medication List as of 11/7/2024 10:49 AM          Final diagnoses:   Hemoptysis   Platelet inhibition due to Plavix   I, Ysabel Espinal, am serving as a trained medical scribe to document services personally performed by Alyssa Mejia Md, based on the provider's statements to me.     IAlyssa Md, was physically present and have reviewed and verified the accuracy of this note documented by Ysabel Espinal.      Alyssa Mejia Md  [facility] EMERGENCY DEPARTMENT  11/7/2024              Alyssa Mejia MD  11/07/24 2611

## 2024-11-07 NOTE — TELEPHONE ENCOUNTER
Allopurinol 100 mg tablet - 1 tablet PO daily    Last Written Prescription Date:  06/12/2024  Last Fill Quantity: 90,   # refills: 0  Last Office Visit : 11/06/2024  Future Office visit:  12/13/2024    Routing refill request to provider for review/approval because:  Has Uric Acid on file in past 12 months and value is less than 6    01/02/2024 Uric Acid 6.4      Has GFR on file in past 12 months and most recent value is normal  11/07/2024 GFR 28  10/25/2024 GFR 31  10/08/2024 GFR 32  08/29/2024 GFR 30

## 2024-11-08 LAB
ATRIAL RATE - MUSE: 65 BPM
DIASTOLIC BLOOD PRESSURE - MUSE: NORMAL MMHG
INTERPRETATION ECG - MUSE: NORMAL
P AXIS - MUSE: 98 DEGREES
PR INTERVAL - MUSE: 244 MS
QRS DURATION - MUSE: 78 MS
QT - MUSE: 418 MS
QTC - MUSE: 434 MS
R AXIS - MUSE: 67 DEGREES
SYSTOLIC BLOOD PRESSURE - MUSE: NORMAL MMHG
T AXIS - MUSE: 94 DEGREES
VENTRICULAR RATE- MUSE: 65 BPM

## 2024-11-11 ENCOUNTER — TELEPHONE (OUTPATIENT)
Dept: CARDIOLOGY | Facility: CLINIC | Age: 89
End: 2024-11-11
Payer: COMMERCIAL

## 2024-11-11 ENCOUNTER — PATIENT OUTREACH (OUTPATIENT)
Dept: CARE COORDINATION | Facility: CLINIC | Age: 89
End: 2024-11-11
Payer: COMMERCIAL

## 2024-11-11 NOTE — TELEPHONE ENCOUNTER
Date: 11/11/2024    Time of Call: 9:10 AM     Diagnosis:  s/p Watchman     [ VORB ] Ordering provider: Randa Ann  Order: Stop Asprin, keep Plavix     Order received by: Masha     Follow-up/additional notes: n/a

## 2024-11-11 NOTE — PROGRESS NOTES
"Clinic Care Coordination Contact  Transitions of Care Outreach  Chief Complaint   Patient presents with    Clinic Care Coordination - Post Hospital       Most Recent Admission Date: 11/7/2024   Most Recent Admission Diagnosis:      Most Recent Discharge Date: 11/7/2024   Most Recent Discharge Diagnosis: Hemoptysis - R04.2  Platelet inhibition due to Plavix - Z79.02     Transitions of Care Assessment    Discharge Assessment  How are you doing now that you are home?: \"I am doing just fine, no problems\"  How are your symptoms? (Red Flag symptoms escalate to triage hotline per guidelines): Improved;Unchanged  Do you know how to contact your clinic care team if you have future questions or changes to your health status? : Yes  Does the patient have their discharge instructions? : Yes  Does the patient have questions regarding their discharge instructions? : No  Were you started on any new medications or were there changes to any of your previous medications? : No  Does the patient have all of their medications?: Yes  Do you have questions regarding any of your medications? : No  Do you have all of your needed medical supplies or equipment (DME)?  (i.e. oxygen tank, CPAP, cane, etc.): Yes       [facility] contacted Pt and Pt is doing great with no concerns. Pt declined CC and there are no other needs at this time.       [facility] Explained and offered Care Coordination support to eligible patients: Yes    Patient accepted? No    Follow up Plan     Discharge Follow-Up  Discharge follow up appointment scheduled in alignment with recommended follow up timeframe or Transitions of Risk Category? (Low = within 30 days; Moderate= within 14 days; High= within 7 days): Yes  Discharge Follow Up Appointment Date: 11/12/24  Discharge Follow Up Appointment Scheduled with?: Primary Care Provider    Future Appointments   Date Time Provider Department Center   11/12/2024  1:30 PM Alondra Ariza MD [facility]   11/25/2024  9:00 AM [facility] " DEVICE 1 [facility]   12/2/2024  9:30 AM Marzena Martin MD [facility]   12/13/2024  3:30 PM Pedro Gomez MD [facility]   2/7/2025  9:40 AM Randa Ann NP [facility]   2/26/2025 12:00 AM [facility] ICD REMOTE [facility]       Outpatient Plan as outlined on AVS reviewed with patient.    For any urgent concerns, please contact our 24 hour nurse triage line: [phone] ([phone])       Sabi BRITT, Community Health Worker  Clinic Care Coordination  [facility]   Clinic #: [phone]  Direct #: [phone]

## 2024-11-11 NOTE — TELEPHONE ENCOUNTER
Attempted telephone call, left voicemail.    Masha Johnson, RN, BSN  Lead Structural Heart Coordinator  [facility]

## 2024-11-12 ENCOUNTER — OFFICE VISIT (OUTPATIENT)
Dept: INTERNAL MEDICINE | Facility: CLINIC | Age: 89
End: 2024-11-12
Payer: COMMERCIAL

## 2024-11-12 ENCOUNTER — ANCILLARY PROCEDURE (OUTPATIENT)
Dept: CARDIOLOGY | Facility: CLINIC | Age: 89
End: 2024-11-12
Attending: INTERNAL MEDICINE
Payer: COMMERCIAL

## 2024-11-12 VITALS
WEIGHT: 145.1 LBS | TEMPERATURE: 98.3 F | SYSTOLIC BLOOD PRESSURE: 191 MMHG | HEIGHT: 66 IN | RESPIRATION RATE: 18 BRPM | BODY MASS INDEX: 23.32 KG/M2 | OXYGEN SATURATION: 100 % | DIASTOLIC BLOOD PRESSURE: 83 MMHG | HEART RATE: 76 BPM

## 2024-11-12 DIAGNOSIS — I10 WHITE COAT SYNDROME WITH DIAGNOSIS OF HYPERTENSION: ICD-10-CM

## 2024-11-12 DIAGNOSIS — I49.5 SICK SINUS SYNDROME (H): ICD-10-CM

## 2024-11-12 DIAGNOSIS — R04.2 HEMOPTYSIS: Primary | ICD-10-CM

## 2024-11-12 LAB
MDC_IDC_EPISODE_DTM: NORMAL
MDC_IDC_EPISODE_DURATION: 39 S
MDC_IDC_EPISODE_DURATION: NORMAL S
MDC_IDC_EPISODE_DURATION: NORMAL S
MDC_IDC_EPISODE_ID: 17
MDC_IDC_EPISODE_ID: 18
MDC_IDC_EPISODE_ID: 19
MDC_IDC_EPISODE_TYPE: NORMAL
MDC_IDC_EPISODE_TYPE_INDUCED: NO
MDC_IDC_LEAD_CONNECTION_STATUS: NORMAL
MDC_IDC_LEAD_CONNECTION_STATUS: NORMAL
MDC_IDC_LEAD_IMPLANT_DT: NORMAL
MDC_IDC_LEAD_IMPLANT_DT: NORMAL
MDC_IDC_LEAD_LOCATION: NORMAL
MDC_IDC_LEAD_LOCATION: NORMAL
MDC_IDC_LEAD_LOCATION_DETAIL_1: NORMAL
MDC_IDC_LEAD_LOCATION_DETAIL_1: NORMAL
MDC_IDC_LEAD_MFG: NORMAL
MDC_IDC_LEAD_MFG: NORMAL
MDC_IDC_LEAD_MODEL: NORMAL
MDC_IDC_LEAD_MODEL: NORMAL
MDC_IDC_LEAD_POLARITY_TYPE: NORMAL
MDC_IDC_LEAD_POLARITY_TYPE: NORMAL
MDC_IDC_LEAD_SERIAL: NORMAL
MDC_IDC_LEAD_SERIAL: NORMAL
MDC_IDC_LEAD_SPECIAL_FUNCTION: NORMAL
MDC_IDC_LEAD_SPECIAL_FUNCTION: NORMAL
MDC_IDC_MSMT_BATTERY_DTM: NORMAL
MDC_IDC_MSMT_BATTERY_REMAINING_LONGEVITY: 106 MO
MDC_IDC_MSMT_BATTERY_RRT_TRIGGER: 2.62
MDC_IDC_MSMT_BATTERY_STATUS: NORMAL
MDC_IDC_MSMT_BATTERY_VOLTAGE: 3 V
MDC_IDC_MSMT_LEADCHNL_RA_IMPEDANCE_VALUE: 304 OHM
MDC_IDC_MSMT_LEADCHNL_RA_IMPEDANCE_VALUE: 342 OHM
MDC_IDC_MSMT_LEADCHNL_RA_PACING_THRESHOLD_AMPLITUDE: 0.5 V
MDC_IDC_MSMT_LEADCHNL_RA_PACING_THRESHOLD_PULSEWIDTH: 0.4 MS
MDC_IDC_MSMT_LEADCHNL_RA_SENSING_INTR_AMPL: 1.88 MV
MDC_IDC_MSMT_LEADCHNL_RV_IMPEDANCE_VALUE: 399 OHM
MDC_IDC_MSMT_LEADCHNL_RV_IMPEDANCE_VALUE: 456 OHM
MDC_IDC_MSMT_LEADCHNL_RV_PACING_THRESHOLD_AMPLITUDE: 1 V
MDC_IDC_MSMT_LEADCHNL_RV_PACING_THRESHOLD_PULSEWIDTH: 0.4 MS
MDC_IDC_MSMT_LEADCHNL_RV_SENSING_INTR_AMPL: 9.5 MV
MDC_IDC_PG_IMPLANT_DTM: NORMAL
MDC_IDC_PG_MFG: NORMAL
MDC_IDC_PG_MODEL: NORMAL
MDC_IDC_PG_SERIAL: NORMAL
MDC_IDC_PG_TYPE: NORMAL
MDC_IDC_SESS_CLINIC_NAME: NORMAL
MDC_IDC_SESS_DTM: NORMAL
MDC_IDC_SESS_TYPE: NORMAL
MDC_IDC_SET_BRADY_AT_MODE_SWITCH_RATE: 171 {BEATS}/MIN
MDC_IDC_SET_BRADY_HYSTRATE: NORMAL
MDC_IDC_SET_BRADY_LOWRATE: 60 {BEATS}/MIN
MDC_IDC_SET_BRADY_MAX_SENSOR_RATE: 130 {BEATS}/MIN
MDC_IDC_SET_BRADY_MAX_TRACKING_RATE: 130 {BEATS}/MIN
MDC_IDC_SET_BRADY_MODE: NORMAL
MDC_IDC_SET_BRADY_PAV_DELAY_LOW: 180 MS
MDC_IDC_SET_BRADY_SAV_DELAY_LOW: 150 MS
MDC_IDC_SET_LEADCHNL_RA_PACING_AMPLITUDE: 1.5 V
MDC_IDC_SET_LEADCHNL_RA_PACING_ANODE_ELECTRODE_1: NORMAL
MDC_IDC_SET_LEADCHNL_RA_PACING_ANODE_LOCATION_1: NORMAL
MDC_IDC_SET_LEADCHNL_RA_PACING_CAPTURE_MODE: NORMAL
MDC_IDC_SET_LEADCHNL_RA_PACING_CATHODE_ELECTRODE_1: NORMAL
MDC_IDC_SET_LEADCHNL_RA_PACING_CATHODE_LOCATION_1: NORMAL
MDC_IDC_SET_LEADCHNL_RA_PACING_POLARITY: NORMAL
MDC_IDC_SET_LEADCHNL_RA_PACING_PULSEWIDTH: 0.4 MS
MDC_IDC_SET_LEADCHNL_RA_SENSING_ANODE_ELECTRODE_1: NORMAL
MDC_IDC_SET_LEADCHNL_RA_SENSING_ANODE_LOCATION_1: NORMAL
MDC_IDC_SET_LEADCHNL_RA_SENSING_CATHODE_ELECTRODE_1: NORMAL
MDC_IDC_SET_LEADCHNL_RA_SENSING_CATHODE_LOCATION_1: NORMAL
MDC_IDC_SET_LEADCHNL_RA_SENSING_POLARITY: NORMAL
MDC_IDC_SET_LEADCHNL_RA_SENSING_SENSITIVITY: 0.3 MV
MDC_IDC_SET_LEADCHNL_RV_PACING_AMPLITUDE: 2 V
MDC_IDC_SET_LEADCHNL_RV_PACING_ANODE_ELECTRODE_1: NORMAL
MDC_IDC_SET_LEADCHNL_RV_PACING_ANODE_LOCATION_1: NORMAL
MDC_IDC_SET_LEADCHNL_RV_PACING_CAPTURE_MODE: NORMAL
MDC_IDC_SET_LEADCHNL_RV_PACING_CATHODE_ELECTRODE_1: NORMAL
MDC_IDC_SET_LEADCHNL_RV_PACING_CATHODE_LOCATION_1: NORMAL
MDC_IDC_SET_LEADCHNL_RV_PACING_POLARITY: NORMAL
MDC_IDC_SET_LEADCHNL_RV_PACING_PULSEWIDTH: 0.4 MS
MDC_IDC_SET_LEADCHNL_RV_SENSING_ANODE_ELECTRODE_1: NORMAL
MDC_IDC_SET_LEADCHNL_RV_SENSING_ANODE_LOCATION_1: NORMAL
MDC_IDC_SET_LEADCHNL_RV_SENSING_CATHODE_ELECTRODE_1: NORMAL
MDC_IDC_SET_LEADCHNL_RV_SENSING_CATHODE_LOCATION_1: NORMAL
MDC_IDC_SET_LEADCHNL_RV_SENSING_POLARITY: NORMAL
MDC_IDC_SET_LEADCHNL_RV_SENSING_SENSITIVITY: 0.9 MV
MDC_IDC_SET_ZONE_DETECTION_INTERVAL: 350 MS
MDC_IDC_SET_ZONE_DETECTION_INTERVAL: 400 MS
MDC_IDC_SET_ZONE_STATUS: NORMAL
MDC_IDC_SET_ZONE_STATUS: NORMAL
MDC_IDC_SET_ZONE_TYPE: NORMAL
MDC_IDC_SET_ZONE_VENDOR_TYPE: NORMAL
MDC_IDC_STAT_AT_BURDEN_PERCENT: 2.8 %
MDC_IDC_STAT_AT_DTM_END: NORMAL
MDC_IDC_STAT_AT_DTM_START: NORMAL
MDC_IDC_STAT_BRADY_AP_VP_PERCENT: 0.03 %
MDC_IDC_STAT_BRADY_AP_VS_PERCENT: 45.26 %
MDC_IDC_STAT_BRADY_AS_VP_PERCENT: 0.03 %
MDC_IDC_STAT_BRADY_AS_VS_PERCENT: 54.68 %
MDC_IDC_STAT_BRADY_DTM_END: NORMAL
MDC_IDC_STAT_BRADY_DTM_START: NORMAL
MDC_IDC_STAT_BRADY_RA_PERCENT_PACED: 46.42 %
MDC_IDC_STAT_BRADY_RV_PERCENT_PACED: 0.14 %
MDC_IDC_STAT_EPISODE_RECENT_COUNT: 0
MDC_IDC_STAT_EPISODE_RECENT_COUNT: 0
MDC_IDC_STAT_EPISODE_RECENT_COUNT: 1
MDC_IDC_STAT_EPISODE_RECENT_COUNT: 1
MDC_IDC_STAT_EPISODE_RECENT_COUNT: 10
MDC_IDC_STAT_EPISODE_RECENT_COUNT_DTM_END: NORMAL
MDC_IDC_STAT_EPISODE_RECENT_COUNT_DTM_START: NORMAL
MDC_IDC_STAT_EPISODE_TOTAL_COUNT: 0
MDC_IDC_STAT_EPISODE_TOTAL_COUNT: 0
MDC_IDC_STAT_EPISODE_TOTAL_COUNT: 1
MDC_IDC_STAT_EPISODE_TOTAL_COUNT: 1
MDC_IDC_STAT_EPISODE_TOTAL_COUNT: 16
MDC_IDC_STAT_EPISODE_TOTAL_COUNT_DTM_END: NORMAL
MDC_IDC_STAT_EPISODE_TOTAL_COUNT_DTM_START: NORMAL
MDC_IDC_STAT_EPISODE_TYPE: NORMAL
MDC_IDC_STAT_TACHYTHERAPY_RECENT_DTM_END: NORMAL
MDC_IDC_STAT_TACHYTHERAPY_RECENT_DTM_START: NORMAL
MDC_IDC_STAT_TACHYTHERAPY_TOTAL_DTM_END: NORMAL
MDC_IDC_STAT_TACHYTHERAPY_TOTAL_DTM_START: NORMAL

## 2024-11-12 PROCEDURE — 93280 PM DEVICE PROGR EVAL DUAL: CPT | Performed by: INTERNAL MEDICINE

## 2024-11-12 RX ORDER — ALLOPURINOL 100 MG/1
100 TABLET ORAL DAILY
Qty: 90 TABLET | Refills: 0 | Status: SHIPPED | OUTPATIENT
Start: 2024-11-12

## 2024-11-12 NOTE — PROGRESS NOTES
Assessment & Plan     Hemoptysis  One episode of hemoptysis on 11/7/24 where she visited ED. No additional episodes since then. However, has had an episodic dry cough for 5-6 weeks which resolves with a cough drop. VS and work-up mostly WNL - Hb at baseline 10.7 (8-10), slightly elevated NTproBNP but denied HF symptoms. Ddx bronchitis with possible capillary bleed, vs. Cancer (lower concern given no symptoms or sign on CXR, hx smoking less than 15 pack-years.) Less concern for CHF no worsening symptoms and not 'pink frothy', less likely gastric AVM bleeding given no vomiting blood/melena, or intraoral bleeding due to reassuring exam. Recommend supportive management with cough lozenges, throat spray, and inhalers as needed.  Drinking warm water/tea. If she had additional episodes of bleeding, or if the cough is worse/not getting better, advised re-evaluation and possibly CT to rule out cancer.     White coat syndrome with diagnosis of hypertension  White coat /83, home -140s. Asymptomatic in clinic today. Continue current antihypertensive regimen.     MED REC REQUIRED  Post Medication Reconciliation Status:           No follow-ups on file.    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 Coughing up red elisabeth blood and some blood clot. Lasted around 3 hours. Has had a cough for 5-6 weeks, but it is intermittent and controlled by cough lozenges. Usually comes in the evening and when going to bed. No issues with breathing, fever, chills. No additional episodes since then.     No throat pain.  "Weight at baseline 135-140 lbs, denied weight loss. Work-up mostly unremarkable Hb at baseline 10.7 (8-10), slightly elevated NTproBNP but denied HF symptoms. Denied vomiting blood, on omeprazole 40 mg daily. One plavix a day, denied taking more unintentionally (has pill box). Teeth pulled 2 weeks prior - had stopped bleeding before then.        White coat /83, home -140s.            Hospital Follow-up Visit:    Hospital/Nursing Home/IP Rehab Facility: [facility]  Date of Admission: 11/7/2024  Date of Discharge: 11/7/2024  Reason(s) for Admission: coughing up blood  Was the patient in the ICU or did the patient experience delirium during hospitalization?  No  Do you have any other stressors you would like to discuss with your provider? OTHER: concerns about family members with illnesses    Problems taking medications regularly:  None  Medication changes since discharge: None  Problems adhering to non-medication therapy:  None    Summary of hospitalization:  [facility] discharge summary reviewed  Diagnostic Tests/Treatments reviewed.  Follow up needed: none  Other Healthcare Providers Involved in Patient s Care:         None  Update since discharge: improved.         Plan of care communicated with patient                     Objective    BP (!) 191/83 (BP Location: Right arm, Patient Position: Sitting, Cuff Size: Adult Small)   Pulse 76   Temp 98.3  F (36.8  C)   Resp 18   Ht 1.676 m (5' 5.98\")   Wt 65.8 kg (145 lb 1.6 oz)   SpO2 100%   BMI 23.43 kg/m    Body mass index is 23.43 kg/m .  Physical Exam   GENERAL: alert and no distress  Throat: normal pharynx and gum (where tooth was pulled), no bleeding stigmata  RESP: lungs clear to auscultation - no rales, rhonchi or wheezes  CV: regular rate and rhythm, normal S1 S2, no murmur  MS: no gross musculoskeletal defects noted, 1+ edema            Signed Electronically by: Alondra " MD To      While the patient was in clinic, I reviewed the pertinent medical history and results.  I discussed the current findings on physical examination, as well as the patient s diagnosis and treatment plan with the resident and agree with the information as documented with the following exceptions: none.  Neha Haney MD  Internal Medicine

## 2024-11-12 NOTE — PATIENT INSTRUCTIONS
Coughing could potentially be from bronchitis.    Keep using the cough lozenges as needed. You can also try throat spray. Drinking warm water/tea.     If you have additional episodes of bleeding, or if the cough is worse/not getting better, please present for an evaluation.

## 2024-11-13 NOTE — TELEPHONE ENCOUNTER
Return Telephone call discussed stopping aspirin. Patient verbalizes understanding.    Masha Johnson, RN, BSN  Lead Structural Heart Coordinator  [facility]

## 2024-11-22 DIAGNOSIS — Z95.5 S/P CORONARY ARTERY STENT PLACEMENT: ICD-10-CM

## 2024-11-26 RX ORDER — CLOPIDOGREL BISULFATE 75 MG/1
75 TABLET ORAL DAILY
Qty: 90 TABLET | Refills: 3 | Status: SHIPPED | OUTPATIENT
Start: 2024-11-26

## 2024-11-26 NOTE — TELEPHONE ENCOUNTER
clopidogrel (PLAVIX) 75 MG tablet   Last Written Prescription Date:  7/6/2024  Last Fill Quantity: 90,   # refills: 1  Last Office Visit : 11/6/2024  Future Office visit:  12/13/2024  Routing clopidogrel (PLAVIX) 75 MG tablet refill request to provider for review/approval because: Failed - required Plt, Hgb labs abnormal result and medication not indicated for associated diagnosis   - Plt (L)  - Hgb (L)    Platelet Count   Date Value Ref Range Status   11/07/2024 88 (L) 150 - 450 10e3/uL Final   08/26/2020 113 (L) 150 - 450 10e9/L Final     Hemoglobin   Date Value Ref Range Status   11/07/2024 10.7 (L) 11.7 - 15.7 g/dL Final   08/26/2020 11.2 (L) 11.7 - 15.7 g/dL Final   ]     31-40 (obesity)

## 2024-12-02 ENCOUNTER — OFFICE VISIT (OUTPATIENT)
Dept: NEPHROLOGY | Facility: CLINIC | Age: 89
End: 2024-12-02
Payer: COMMERCIAL

## 2024-12-02 VITALS — DIASTOLIC BLOOD PRESSURE: 64 MMHG | HEART RATE: 48 BPM | SYSTOLIC BLOOD PRESSURE: 130 MMHG

## 2024-12-02 DIAGNOSIS — N18.4 ANEMIA OF CHRONIC RENAL FAILURE, STAGE 4 (SEVERE) (H): ICD-10-CM

## 2024-12-02 DIAGNOSIS — R80.1 PERSISTENT PROTEINURIA: ICD-10-CM

## 2024-12-02 DIAGNOSIS — E87.6 HYPOKALEMIA: ICD-10-CM

## 2024-12-02 DIAGNOSIS — D63.1 ANEMIA OF CHRONIC RENAL FAILURE, STAGE 4 (SEVERE) (H): ICD-10-CM

## 2024-12-02 DIAGNOSIS — N18.4 CKD (CHRONIC KIDNEY DISEASE) STAGE 4, GFR 15-29 ML/MIN (H): Primary | ICD-10-CM

## 2024-12-02 DIAGNOSIS — I48.0 PAROXYSMAL ATRIAL FIBRILLATION (H): ICD-10-CM

## 2024-12-02 LAB
ALBUMIN SERPL BCG-MCNC: 4.1 G/DL (ref 3.5–5.2)
ANION GAP SERPL CALCULATED.3IONS-SCNC: 11 MMOL/L (ref 7–15)
BUN SERPL-MCNC: 27.1 MG/DL (ref 8–23)
CALCIUM SERPL-MCNC: 9.2 MG/DL (ref 8.8–10.4)
CHLORIDE SERPL-SCNC: 107 MMOL/L (ref 98–107)
CREAT SERPL-MCNC: 1.69 MG/DL (ref 0.51–0.95)
CREAT UR-MCNC: 26.8 MG/DL
EGFRCR SERPLBLD CKD-EPI 2021: 29 ML/MIN/1.73M2
ERYTHROCYTE [DISTWIDTH] IN BLOOD BY AUTOMATED COUNT: 15.9 % (ref 10–15)
FERRITIN SERPL-MCNC: 558 NG/ML (ref 11–328)
GLUCOSE SERPL-MCNC: 94 MG/DL (ref 70–99)
HCO3 SERPL-SCNC: 25 MMOL/L (ref 22–29)
HCT VFR BLD AUTO: 33.6 % (ref 35–47)
HGB BLD-MCNC: 10.2 G/DL (ref 11.7–15.7)
IRON BINDING CAPACITY (ROCHE): 249 UG/DL (ref 240–430)
IRON SATN MFR SERPL: 19 % (ref 15–46)
IRON SERPL-MCNC: 48 UG/DL (ref 37–145)
MCH RBC QN AUTO: 29.9 PG (ref 26.5–33)
MCHC RBC AUTO-ENTMCNC: 30.4 G/DL (ref 31.5–36.5)
MCV RBC AUTO: 99 FL (ref 78–100)
MICROALBUMIN UR-MCNC: 419 MG/L
MICROALBUMIN/CREAT UR: 1563.43 MG/G CR (ref 0–25)
PHOSPHATE SERPL-MCNC: 3.4 MG/DL (ref 2.5–4.5)
PLATELET # BLD AUTO: 72 10E3/UL (ref 150–450)
POTASSIUM SERPL-SCNC: 3.9 MMOL/L (ref 3.4–5.3)
PTH-INTACT SERPL-MCNC: 132 PG/ML (ref 15–65)
RBC # BLD AUTO: 3.41 10E6/UL (ref 3.8–5.2)
SODIUM SERPL-SCNC: 143 MMOL/L (ref 135–145)
VIT D+METAB SERPL-MCNC: 58 NG/ML (ref 20–50)
WBC # BLD AUTO: 6.7 10E3/UL (ref 4–11)

## 2024-12-02 PROCEDURE — G2211 COMPLEX E/M VISIT ADD ON: HCPCS | Performed by: INTERNAL MEDICINE

## 2024-12-02 PROCEDURE — 82043 UR ALBUMIN QUANTITATIVE: CPT | Performed by: INTERNAL MEDICINE

## 2024-12-02 PROCEDURE — 99214 OFFICE O/P EST MOD 30 MIN: CPT | Performed by: INTERNAL MEDICINE

## 2024-12-02 PROCEDURE — 36415 COLL VENOUS BLD VENIPUNCTURE: CPT | Performed by: INTERNAL MEDICINE

## 2024-12-02 PROCEDURE — 86036 ANCA SCREEN EACH ANTIBODY: CPT | Performed by: INTERNAL MEDICINE

## 2024-12-02 PROCEDURE — 82570 ASSAY OF URINE CREATININE: CPT | Performed by: INTERNAL MEDICINE

## 2024-12-02 PROCEDURE — 82306 VITAMIN D 25 HYDROXY: CPT | Performed by: INTERNAL MEDICINE

## 2024-12-02 PROCEDURE — 83550 IRON BINDING TEST: CPT | Performed by: INTERNAL MEDICINE

## 2024-12-02 PROCEDURE — 82728 ASSAY OF FERRITIN: CPT | Performed by: INTERNAL MEDICINE

## 2024-12-02 PROCEDURE — 83540 ASSAY OF IRON: CPT | Performed by: INTERNAL MEDICINE

## 2024-12-02 PROCEDURE — 80069 RENAL FUNCTION PANEL: CPT | Performed by: INTERNAL MEDICINE

## 2024-12-02 PROCEDURE — 85027 COMPLETE CBC AUTOMATED: CPT | Performed by: INTERNAL MEDICINE

## 2024-12-02 PROCEDURE — 83970 ASSAY OF PARATHORMONE: CPT | Performed by: INTERNAL MEDICINE

## 2024-12-02 RX ORDER — LOSARTAN POTASSIUM 25 MG/1
25 TABLET ORAL 2 TIMES DAILY
Qty: 180 TABLET | Refills: 3 | Status: SHIPPED | OUTPATIENT
Start: 2024-12-02

## 2024-12-02 NOTE — PROGRESS NOTES
Nephrology Progress Note       Assessment and Plan:   89 year old female with history of long standing HTN, CAD s/p multiple stents, who presents for followup of CKD stage 4, baseline SCr 1.8-2.2 mg/dL now with proteinuria. She had another angiogram with stent done June 2022 and November 2023 (she now has 10 stents). She had watchman in August 2024 for her atrial fibrillation. She had GI bleed in summer 2024    1. CKD stage 4- baseline SCr 1.8-2.2mg/ dL, eGFR 25-29 ml/min. Mild/ minimal proteinuria now up to > 1gram, due to ? Uncontrolled BP  - recent urine albumin/cr 1247 mg/g, improved from 1939 on 10/8, was down to 243 mg/g in 5/2024.   Her swelling was previously occasional. She's had more swelling since taking amlodipine 10 mg daily and had been taking furosemide 40 mg BID. Now only taking amlodipine 5 mg daily and 40mg once a day of lasix. Swelling is OK , takes 20mg in PM if needed.  - Screatinine stable at 1.6, up to 1.7 on lisinopril 2.5 mg which was started in October   - Has proteinuria - may be due to BP not ideally controlled.   - BP 130s/70s -140s at home.  - monitor labs, relatively stable at this time  - started lisinopril 2.5 mg daily, cannot see that she has been on this, given proteinuria and CKD- but has had a cough since November, a dry cough, thus will switch her to losartan 25mg bid.  Repeat labs today and in February  - will check some blood pressure at home    2. Electrolytes/Acid Base status- normal.    Hypokalemia- takes potassium 20 mEq pills daily  - K 3.6, Na 147 (in ER) - repeat labs today   Metabolic acidosis- bicarbonate was lower at 17, now up to 24-25; continue sodium bicarb 650 mg BID, consider lowering to once a day if > 25  - also taking CHACHO; ER 20 mEq daily, may stop this if K higher with adding ACE-I/ ARB    3. Hypertension/Volume status-  She is 130-140s at home,, and in office today 130/64  She is on amlodipine 5 mg daily, isosorbide mononitrate 90 mg daily, metoprolol 50 mg  BID, furosemide 40 mg daily, an extra 20 mg in pm as needed (rarely uses)    - started lisinopril 5 mg daily and has had a dry cough. Went to ER in November for cough with blood - no etiology found-   - continue checking BP at home  - follows with cardiology for Afib, intolerance to AC due to GIB, s/p Watchman procedure on 8/4/2024. Off eliquis, now taking plavix; ASA stopped after bleeding   -patient reports history of reaction/intolerance to Spironolactone, Chlorthalidone, Clonidine and Terazosin in the past   - had new stent to RCA placed on 7/17/23 and another in November 2023  - ECHO 8/29/2024, limited and compared to 8/8/2024  Interpretation Summary  Left ventricular size, wall motion and function are normal. The ejection  fraction is 60-65%.  Right ventricular function, chamber size, wall motion, and thickness are  normal.  Mild to moderate mitral insufficiency is present.  Mild to moderate tricuspid insufficiency is present.  Dilation of the inferior vena cava is present with abnormal respiratory  variation in diameter. No pericardial effusion is present.    4. Anemia- hgb at 10.1, up from 8.2 in August 2024, from 7.5 on 7/5/24.  seen in ED on 7/5, Hgb 6.9 was transfused. Recent iron sat 27%,  - history of transfusions- she was seen by hematology and GI. Recommended to stop oral iron and start IV iron due to potential for GI bleeding and difficulty in knowing if black stools are from iron or bleeding.   - low platelet count- 128, improved from 72  - continue follow up with anemia clinic      5. BMD  - last serum calcium and phosphorus were normal  -secondary hyperparathyroidism-PTH was 93  - vit D is high normal at 60- repeat yearly.     Assessment and plan was discussed with patient and she voiced her understanding and agreement.        Reason for Visit:  Tessa Mansfield is an 89 year old female with HTN and CAD, who presents for CKD management.     HPI:  She is a pleasant female with PMMHx significant for  stage III CKD presumed secondary to hypertensive nephrosclerosis.Her renal function has been  relatively stable, ranging from 1.8-2.2 mg/dL over the years. She has history of CAD s/p multiple stents  (8 stents in all) since 2004 and a pacemaker in 2007. She follows with cardiologist Dr Santoyo and Dr Zhu.    She had elected stent placed in RCA on 7/17/23 to help with angina.     She was admitted from 7/19 to 7/23/23 for melena secondary to bleeding colonic angiodysplastic lesion, acute on chronic anemia. S/p repair.     She has been bleeding again and was admitted from 7/5-7/6/24 with acute on chronic anemia and had another transfusion. She has been referred to anemia clinic.     She has been taking furosemide once a day for a while, takes 20 mg in the afternoon as needed.    She is eating a little better. Per patient she has lost ~ 20 lbs since March, now stabilized.   She denies any further diarrhea no n/v.     She had another angiogram in November 2023, she was having chest pain (back pain ) with exertion which resolved after stent.    She is s/p watchman procedure on 8/4, Watchman procedure complicated by new pericardial effusion without tamponade. S/p pericardiocentesis 8/8 with 220 ml off. There was no contrast extravasation noted. She was admitted for post procedural monitoring with pericardial drain in place. Ultimately the drain was removed and she was discharged home.     Her son was ?diagnosed with Albina Gehrig but now [facility] told him it is not ALS- relieved. Other son has a pancreatic lesion being monitored.       She is feeling Ok but did go to ER in November, due to cough with blood tinged sputum. No etiology was found. She is now off ASA and only on plavix.  . BP's have been upper 130s-140s, and was started on lisinopril at last visit with Laura Llanes for BP and proteinuria and hypokalemia. She has ongoing dry cough so we will switch her to losartan.    Home BP: 130s/    Baseline Cr:  1.8-2.2    ROS:  A comprehensive review of systems was obtained and negative, except as noted in the HPI or PMH.    Active Medical Problems:  Patient Active Problem List   Diagnosis    Degeneration of cervical intervertebral disc    Nonallopathic lesion of cervical region    Nonallopathic lesion of thoracic region    Coronary artery disease of native artery of native heart with stable angina pectoris (H)    Dizziness and giddiness    Edema    Esophageal reflux    Gout    Essential hypertension    Obstructive sleep apnea    Osteomalacia    Post-menopausal osteoporosis    Cardiac Pacemaker- Medtronic, dual chamber- NOT dependant    Transient complete heart block (H)    Sinus node dysfunction (H)    Disturbance of skin sensation    NSTEMI (non-ST elevated myocardial infarction) (H)    Arrhythmia    Sick sinus syndrome (H)    Non-rheumatic mitral regurgitation    Non-rheumatic tricuspid valve insufficiency    Anemia of chronic renal failure, stage 4 (severe) (H)    Status post coronary angiogram    Secondary renal hyperparathyroidism (H)    Thrombocytopenia (H)    Renal hypertension    Acute on chronic diastolic (congestive) heart failure (H)    Chest pain, unspecified type    Long term (current) use of anticoagulants    Melena    General weakness    S/P coronary artery stent placement    Antiplatelet or antithrombotic long-term use    Anemia, unspecified type    Aftercare following surgery of the musculoskeletal system    Closed right femoral fracture (H)    Hyperlipidemia    Hypokalemia    Orthostatic hypotension    Osteoarthritis of right patellofemoral joint    Postoperative anemia due to acute blood loss    Chronic anemia    ACP (advance care planning)    Chronic kidney disease, stage 3 (H)    Biomechanical lesion, unspecified    GERD (gastroesophageal reflux disease)    Weakness    Acute cystitis with hematuria    Leukocytosis, unspecified type    Small bowel obstruction (H)    UGIB (upper gastrointestinal bleed)     Paroxysmal atrial fibrillation (H)    Stage 3a chronic kidney disease (H)    Symptomatic anemia    Gastrointestinal hemorrhage, unspecified gastrointestinal hemorrhage type    Atrial fibrillation (H)    Restless legs    Hypertensive heart and kidney disease    History of myocardial infarction    Chronic cough       Personal Hx:   Social History     Socioeconomic History    Marital status:      Spouse name: Not on file    Number of children: 4    Years of education: Not on file    Highest education level: Not on file   Occupational History     Employer: [employer]   Tobacco Use    Smoking status: Former     Current packs/day: 0.30     Average packs/day: 0.3 packs/day for 15.0 years (4.5 ttl pk-yrs)     Types: Cigarettes    Smokeless tobacco: Never    Tobacco comments:     Quit 35+ years ago   Vaping Use    Vaping status: Never Used   Substance and Sexual Activity    Alcohol use: Not on file    Drug use: No    Sexual activity: Not Currently     Partners: Male     Birth control/protection: Post-menopausal   Other Topics Concern     Service Not Asked    Blood Transfusions Yes    Caffeine Concern Not Asked    Occupational Exposure Not Asked    Hobby Hazards Not Asked    Sleep Concern Not Asked    Stress Concern Not Asked    Weight Concern Not Asked    Special Diet Not Asked    Back Care Not Asked    Exercise No    Bike Helmet Not Asked    Seat Belt Not Asked    Self-Exams Not Asked    Parent/sibling w/ CABG, MI or angioplasty before 65F 55M? Not Asked   Social History Narrative    Not on file     Social Drivers of Health     Financial Resource Strain: Low Risk  (11/15/2023)    Financial Resource Strain     Within the past 12 months, have you or your family members you live with been unable to get utilities (heat, electricity) when it was really needed?: No   Food Insecurity: Low Risk  (11/15/2023)    Food Insecurity     Within the past 12 months, did you worry that your food would run out  "before you got money to buy more?: No     Within the past 12 months, did the food you bought just not last and you didn t have money to get more?: No   Transportation Needs: Low Risk  (11/15/2023)    Transportation Needs     Within the past 12 months, has lack of transportation kept you from medical appointments, getting your medicines, non-medical meetings or appointments, work, or from getting things that you need?: No   Physical Activity: Not on file   Stress: Not on file   Social Connections: Not on file   Interpersonal Safety: Low Risk  (10/8/2024)    Interpersonal Safety     Do you feel physically and emotionally safe where you currently live?: Yes     Within the past 12 months, have you been hit, slapped, kicked or otherwise physically hurt by someone?: No     Within the past 12 months, have you been humiliated or emotionally abused in other ways by your partner or ex-partner?: No   Housing Stability: Low Risk  (11/15/2023)    Housing Stability     Do you have housing? : Yes     Are you worried about losing your housing?: No       Allergies:  Allergies   Allergen Reactions    Shrimp Swelling    Codeine Sulfate Itching    Indomethacin      Other Reaction(s): Not available    Levofloxacin Hemihydrate      Other Reaction(s): Not available    Morphine Sulfate      Other Reaction(s): Not available    Oxycodone Other (See Comments)    Shrimp Extract Swelling    Sulfamethoxazole W/Trimethoprim      Other Reaction(s): Not available    Chlorthalidone Nausea and Vomiting     Other Reaction(s): Not available    Clarithromycin      Other Reaction(s): Not available    Clonidine      Other Reaction(s): Not available    Darvon [Propoxyphene Hcl]     Gabapentin Confusion and Fatigue     \"felt drunk\"    Other Reaction(s): Not available    Hydromorphone Hallucination and Visual Disturbance     Tolerated in April 2024 hospital admissions    Other Reaction(s): Not available    Indomethacin     Percocet [Oxycodone-Acetaminophen] " Hallucination    Pregabalin Fatigue and Itching    Simvastatin Palpitations and Cramps     Muscle weakness, leg cramping    Spironolactone      Dehydrated    Other Reaction(s): Not available    Terazosin      Other Reaction(s): Not available       Current Outpatient Medications   Medication Sig Dispense Refill    acetaminophen (TYLENOL) 325 MG tablet Take 975 mg by mouth nightly as needed for pain      allopurinol (ZYLOPRIM) 100 MG tablet Take 1 tablet (100 mg) by mouth daily. Patient needs to have labs for further refills. 90 tablet 0    amLODIPine (NORVASC) 5 MG tablet Take 1 tablet (5 mg) by mouth daily. 90 tablet 3    BUDESONIDE IN Inhale into the lungs.      clopidogrel (PLAVIX) 75 MG tablet Take 1 tablet (75 mg) by mouth daily. 90 tablet 3    fluticasone-salmeterol (ADVAIR) 250-50 MCG/ACT inhaler INHALE 1 DOSE BY MOUTH TWICE DAILY 60 each 8    furosemide (LASIX) 40 MG tablet Take 1 tablet (40 mg) by mouth daily May take an additional 20 mg at noon as needed for swelling. 90 tablet 2    isosorbide mononitrate (IMDUR) 30 MG 24 hr tablet Take 3 tablets (90 mg) by mouth daily 90 tablet 11    lisinopril (ZESTRIL) 2.5 MG tablet Take 1 tablet (2.5 mg) by mouth daily. 90 tablet 3    methocarbamol (ROBAXIN) 500 MG tablet Take 1 tablet (500 mg) by mouth every 6 hours as needed for muscle spasms 30 tablet 0    metoprolol tartrate (LOPRESSOR) 25 MG tablet Take 2 tablets (50 mg) by mouth 2 times daily 180 tablet 3    Multiple Vitamins-Minerals (PRESERVISION AREDS 2+MULTI VIT PO) Take 1 capsule by mouth 2 times daily      omeprazole (PRILOSEC) 40 MG DR capsule Take 1 capsule (40 mg) by mouth daily. 90 capsule 3    potassium chloride ER (K-TAB) 20 MEQ CR tablet Take 1 tablet (20 mEq) by mouth daily 90 tablet 3    Psyllium (METAMUCIL PO) Take by mouth.      rosuvastatin (CRESTOR) 20 MG tablet Take 1 tablet (20 mg) by mouth daily. 90 tablet 1    sodium bicarbonate 650 MG tablet Take 1 tablet (650 mg) by mouth 2 times daily 60  tablet 11    timolol maleate (TIMOPTIC) 0.5 % ophthalmic solution INSTILL 1 DROP INTO EACH EYE ONCE DAILY IN THE MORNING      vitamin B-12 (CYANOCOBALAMIN) 500 MCG tablet Take 1 tablet by mouth daily      vitamin D3 25 mcg (1000 units) tablet Take 1 tablet (25 mcg) by mouth every other day 90 tablet 3     No current facility-administered medications for this visit.       Vitals:  /64   Pulse (!) 48   Pulse  is normal, occasional irreg/ PVC -     Exam:  General: awake and alert, no acute distress  Skin: warm and dry, no visible rash  Heart: Reg rate and rhythm  Lungs: CTA  Extremities: no significant LE edema     Results:  Last Comprehensive Metabolic Panel:  Sodium   Date Value Ref Range Status   11/07/2024 147 (H) 135 - 145 mmol/L Final   08/26/2020 141 133 - 144 mmol/L Final     Potassium   Date Value Ref Range Status   11/07/2024 3.6 3.4 - 5.3 mmol/L Final   11/21/2022 4.8 3.4 - 5.3 mmol/L Final   08/26/2020 4.4 3.4 - 5.3 mmol/L Final     Potassium POCT   Date Value Ref Range Status   08/08/2024 3.5 3.4 - 5.3 mmol/L Final     Comment:     CRITICAL RESULTS NOTED BY CCL and MD     Chloride   Date Value Ref Range Status   11/07/2024 108 (H) 98 - 107 mmol/L Final   11/21/2022 110 (H) 94 - 109 mmol/L Final   08/26/2020 111 (H) 94 - 109 mmol/L Final     Carbon Dioxide   Date Value Ref Range Status   08/26/2020 24 20 - 32 mmol/L Final     Carbon Dioxide (CO2)   Date Value Ref Range Status   11/07/2024 25 22 - 29 mmol/L Final   11/21/2022 24 20 - 32 mmol/L Final     Anion Gap   Date Value Ref Range Status   11/07/2024 14 7 - 15 mmol/L Final   11/21/2022 6 3 - 14 mmol/L Final   08/26/2020 8 3 - 14 mmol/L Final     Glucose   Date Value Ref Range Status   11/07/2024 84 70 - 99 mg/dL Final   11/21/2022 79 70 - 99 mg/dL Final   08/26/2020 84 70 - 99 mg/dL Final     GLUCOSE BY METER POCT   Date Value Ref Range Status   08/09/2024 182 (H) 70 - 99 mg/dL Final     Urea Nitrogen   Date Value Ref Range Status   11/07/2024  36.0 (H) 8.0 - 23.0 mg/dL Final   11/21/2022 35 (H) 7 - 30 mg/dL Final   08/26/2020 41 (H) 7 - 30 mg/dL Final     Creatinine   Date Value Ref Range Status   11/07/2024 1.74 (H) 0.51 - 0.95 mg/dL Final   08/26/2020 1.70 (H) 0.52 - 1.04 mg/dL Final     GFR Estimate   Date Value Ref Range Status   11/07/2024 28 (L) >60 mL/min/1.73m2 Final     Comment:     eGFR calculated using 2021 CKD-EPI equation.   08/26/2020 27 (L) >60 mL/min/[1.73_m2] Final     Comment:     Non  GFR Calc  Starting 12/18/2018, serum creatinine based estimated GFR (eGFR) will be   calculated using the Chronic Kidney Disease Epidemiology Collaboration   (CKD-EPI) equation.       Calcium   Date Value Ref Range Status   11/07/2024 9.4 8.8 - 10.4 mg/dL Final     Comment:     Reference intervals for this test were updated on 7/16/2024 to reflect our healthy population more accurately. There may be differences in the flagging of prior results with similar values performed with this method. Those prior results can be interpreted in the context of the updated reference intervals.   08/26/2020 9.0 8.5 - 10.1 mg/dL Final       Last Basic Metabolic Panel:  Lab Results   Component Value Date     11/08/2017      Lab Results   Component Value Date    POTASSIUM 3.5 11/08/2017     Lab Results   Component Value Date    CHLORIDE 104 11/08/2017     Lab Results   Component Value Date    ALEXANDRO 8.8 11/08/2017     Lab Results   Component Value Date    CO2 26 11/08/2017     Lab Results   Component Value Date    BUN 54 11/08/2017     Lab Results   Component Value Date    CR 2.36 11/08/2017     Lab Results   Component Value Date    GLC 88 11/08/2017

## 2024-12-02 NOTE — PATIENT INSTRUCTIONS
Labs today  See us back in February    Start losartan 25mg twice a day instead of lisinopril (which may be causing the cough)

## 2024-12-03 ENCOUNTER — PATIENT OUTREACH (OUTPATIENT)
Dept: CARE COORDINATION | Facility: CLINIC | Age: 89
End: 2024-12-03
Payer: COMMERCIAL

## 2024-12-03 NOTE — PROGRESS NOTES
Anemia Management Note - Follow Up      SUBJECTIVE/OBJECTIVE:    Referred by Dr. Marzena Martin on 2024  Primary Diagnosis: Anemia in Chronic Kidney Disease (N18.4, D63.1)     Secondary Diagnosis: Chronic Kidney Disease, Stage 4 (N18.4)   Hgb goal range: 9-10     Epo/Darbo: TBD; treat with IV Iron per clinic note on 24.   Iron regimen: Treat with IV Iron; Oral Iron stopped  *Note form 24; Recommended to stop oral iron and start IV iron due to potential for GI bleeding and difficulty in knowing if black stools are from iron or bleeding.       Labs : 2025  RX/TX plans : TBD     Recent BREANNA use, transfusion, IV iron: Venofer and PRBCs  Hx of CAD, NTEMI (), HTN, Pacemaker, AFib, Anticoagulated.     Contact: Consent to Communicate scanned on 2019.           Latest Ref Rng & Units 2024 8/15/2024 2024 10/8/2024 10/25/2024 2024 2024   Anemia   Hemoglobin 11.7 - 15.7 g/dL 8.2  8.3  9.7  10.9  10.1  10.7  10.2    TSAT 15 - 46 %   19  26  27   19    Ferritin 11 - 328 ng/mL   1,036  777  611   558         BP Readings from Last 3 Encounters:   24 130/64   24 (!) 191/83   24 (!) 156/80     Wt Readings from Last 2 Encounters:   24 65.8 kg (145 lb 1.6 oz)   24 65 kg (143 lb 6.4 oz)           ASSESSMENT:    Hgb:at goal - continue to monitor  TSat: not at goal (>30%) but ferritin >500ng/mL.  IV iron not indicated at this time per anemia protocol. Ferritin: At goal (>100ng/mL)        PLAN:  RTC for Anemia Labs in one month.     Orders needed to be renewed (for next follow-up date) in EPIC: None    Iron labs due:  Early 2025    Plan discussed with:  No call, chart review       NEXT FOLLOW-UP DATE:  25    Barbara Dimas RN   Anemia Services  [email or web address]   Office : [phone]  Fax: [phone]

## 2024-12-04 LAB
ANCA AB PATTERN SER IF-IMP: NORMAL
C-ANCA TITR SER IF: NORMAL {TITER}

## 2024-12-18 NOTE — TELEPHONE ENCOUNTER
RX refilled- patient is to follow up in 1 year   Detail Level: Generalized Detail Level: Zone Detail Level: Detailed

## 2025-01-08 ENCOUNTER — TELEPHONE (OUTPATIENT)
Dept: NEPHROLOGY | Facility: CLINIC | Age: 89
End: 2025-01-08
Payer: COMMERCIAL

## 2025-01-08 DIAGNOSIS — I25.118 CORONARY ARTERY DISEASE OF NATIVE ARTERY OF NATIVE HEART WITH STABLE ANGINA PECTORIS: ICD-10-CM

## 2025-01-08 RX ORDER — METOPROLOL TARTRATE 25 MG/1
50 TABLET, FILM COATED ORAL 2 TIMES DAILY
Qty: 180 TABLET | Refills: 3 | Status: SHIPPED | OUTPATIENT
Start: 2025-01-08

## 2025-01-08 NOTE — TELEPHONE ENCOUNTER
[facility] Call Center    Phone Message    May a detailed message be left on voicemail: yes     Reason for Call: Medication Refill Request    Has the patient contacted the pharmacy for the refill? Yes   Name of medication being requested: Metoprolol  Provider who prescribed the medication: Nikki  [facility]: [facility] [phone]  Date medication is needed: ASAP She called into pharmacy on 1.2.25 and has not had ok'd. She has only 1 pill left       Action Taken: Other: [facility]    Travel Screening: Not Applicable     Date of Service:

## 2025-01-08 NOTE — TELEPHONE ENCOUNTER
Nephrology Note: Medication Refill Request    Medication Refill Request:     Medication/Dose/Frequency: Metoprolol 25 mg  [facility]  Provider: Mario Alberto DEAN                          SITUATION/BACKROUND:                 Last office visit: 12/2/24        Future office visit: 2/17/25     ASSESSMENT:     Neph Assessments:    Recent Labs:  CBC Results:  Recent Labs   Lab Test 12/02/24  1013   WBC 6.7   RBC 3.41*   HGB 10.2*   HCT 33.6*   MCV 99   MCH 29.9   MCHC 30.4*   RDW 15.9*   PLT 72*     Last Renal Panel:  Sodium   Date Value Ref Range Status   12/02/2024 143 135 - 145 mmol/L Final   08/26/2020 141 133 - 144 mmol/L Final     Potassium   Date Value Ref Range Status   12/02/2024 3.9 3.4 - 5.3 mmol/L Final   11/21/2022 4.8 3.4 - 5.3 mmol/L Final   08/26/2020 4.4 3.4 - 5.3 mmol/L Final     Potassium POCT   Date Value Ref Range Status   08/08/2024 3.5 3.4 - 5.3 mmol/L Final     Comment:     CRITICAL RESULTS NOTED BY CCL and MD     Chloride   Date Value Ref Range Status   12/02/2024 107 98 - 107 mmol/L Final   11/21/2022 110 (H) 94 - 109 mmol/L Final   08/26/2020 111 (H) 94 - 109 mmol/L Final     Carbon Dioxide   Date Value Ref Range Status   08/26/2020 24 20 - 32 mmol/L Final     Carbon Dioxide (CO2)   Date Value Ref Range Status   12/02/2024 25 22 - 29 mmol/L Final   11/21/2022 24 20 - 32 mmol/L Final     Anion Gap   Date Value Ref Range Status   12/02/2024 11 7 - 15 mmol/L Final   11/21/2022 6 3 - 14 mmol/L Final   08/26/2020 8 3 - 14 mmol/L Final     Glucose   Date Value Ref Range Status   12/02/2024 94 70 - 99 mg/dL Final   11/21/2022 79 70 - 99 mg/dL Final   08/26/2020 84 70 - 99 mg/dL Final     GLUCOSE BY METER POCT   Date Value Ref Range Status   08/09/2024 182 (H) 70 - 99 mg/dL Final     Urea Nitrogen   Date Value Ref Range Status   12/02/2024 27.1 (H) 8.0 - 23.0 mg/dL Final   11/21/2022 35 (H) 7 - 30 mg/dL Final   08/26/2020 41 (H) 7 - 30 mg/dL Final     Creatinine   Date Value Ref Range  Status   12/02/2024 1.69 (H) 0.51 - 0.95 mg/dL Final   08/26/2020 1.70 (H) 0.52 - 1.04 mg/dL Final     GFR Estimate   Date Value Ref Range Status   12/02/2024 29 (L) >60 mL/min/1.73m2 Final     Comment:     eGFR calculated using 2021 CKD-EPI equation.   08/26/2020 27 (L) >60 mL/min/[1.73_m2] Final     Comment:     Non  GFR Calc  Starting 12/18/2018, serum creatinine based estimated GFR (eGFR) will be   calculated using the Chronic Kidney Disease Epidemiology Collaboration   (CKD-EPI) equation.       Calcium   Date Value Ref Range Status   12/02/2024 9.2 8.8 - 10.4 mg/dL Final     Comment:     Reference intervals for this test were updated on 7/16/2024 to reflect our healthy population more accurately. There may be differences in the flagging of prior results with similar values performed with this method. Those prior results can be interpreted in the context of the updated reference intervals.   08/26/2020 9.0 8.5 - 10.1 mg/dL Final     Phosphorus   Date Value Ref Range Status   12/02/2024 3.4 2.5 - 4.5 mg/dL Final   08/26/2020 3.6 2.5 - 4.5 mg/dL Final     Albumin   Date Value Ref Range Status   12/02/2024 4.1 3.5 - 5.2 g/dL Final   11/21/2022 3.8 3.4 - 5.0 g/dL Final   08/26/2020 3.6 3.4 - 5.0 g/dL Final       Current Medication List:   Current Outpatient Medications (Antihypertensive, Cardiovascular, Diuretics, Beta blockers, Calcium blockers, Anticoagulants)   Medication Sig    metoprolol tartrate (LOPRESSOR) 25 MG tablet Take 2 tablets (50 mg) by mouth 2 times daily.    amLODIPine (NORVASC) 5 MG tablet Take 1 tablet (5 mg) by mouth daily.    furosemide (LASIX) 40 MG tablet Take 1 tablet (40 mg) by mouth daily May take an additional 20 mg at noon as needed for swelling.    losartan (COZAAR) 25 MG tablet Take 1 tablet (25 mg) by mouth 2 times daily.     Current Outpatient Medications (Other)   Medication Sig    acetaminophen (TYLENOL) 325 MG tablet Take 975 mg by mouth nightly as needed for pain     allopurinol (ZYLOPRIM) 100 MG tablet Take 1 tablet (100 mg) by mouth daily. Patient needs to have labs for further refills.    BUDESONIDE IN Inhale into the lungs.    clopidogrel (PLAVIX) 75 MG tablet Take 1 tablet (75 mg) by mouth daily.    fluticasone-salmeterol (ADVAIR) 250-50 MCG/ACT inhaler INHALE 1 DOSE BY MOUTH TWICE DAILY    isosorbide mononitrate (IMDUR) 30 MG 24 hr tablet Take 3 tablets (90 mg) by mouth daily    methocarbamol (ROBAXIN) 500 MG tablet Take 1 tablet (500 mg) by mouth every 6 hours as needed for muscle spasms    Multiple Vitamins-Minerals (PRESERVISION AREDS 2+MULTI VIT PO) Take 1 capsule by mouth 2 times daily    omeprazole (PRILOSEC) 40 MG DR capsule Take 1 capsule (40 mg) by mouth daily.    potassium chloride ER (K-TAB) 20 MEQ CR tablet Take 1 tablet (20 mEq) by mouth daily    Psyllium (METAMUCIL PO) Take by mouth.    rosuvastatin (CRESTOR) 20 MG tablet Take 1 tablet (20 mg) by mouth daily.    sodium bicarbonate 650 MG tablet Take 1 tablet (650 mg) by mouth 2 times daily    timolol maleate (TIMOPTIC) 0.5 % ophthalmic solution INSTILL 1 DROP INTO EACH EYE ONCE DAILY IN THE MORNING    vitamin B-12 (CYANOCOBALAMIN) 500 MCG tablet Take 1 tablet by mouth daily    vitamin D3 25 mcg (1000 units) tablet Take 1 tablet (25 mcg) by mouth every other day       Has patient had kidney transplant in the prior 1 year: no.   Has patient been seen the last 12 months: Yes.  Associated labs reviewed for medication: Yes    PLAN:     Medication refilled per protocol: Yes    CINTHIA MUNGUIA RN

## 2025-01-30 DIAGNOSIS — M10.00 IDIOPATHIC GOUT, UNSPECIFIED CHRONICITY, UNSPECIFIED SITE: ICD-10-CM

## 2025-02-04 RX ORDER — ALLOPURINOL 100 MG/1
TABLET ORAL
Qty: 90 TABLET | Refills: 0 | Status: SHIPPED | OUTPATIENT
Start: 2025-02-04

## 2025-02-04 NOTE — TELEPHONE ENCOUNTER
Last Written Prescription:  allopurinol (ZYLOPRIM) 100 MG tablet 90 tablet 0 11/12/2024     ----------------------  Last Visit Date: 12/13/24  Future Visit Date: 2/7/25  ----------------------    Refill decision: Medication unable to be refilled by RN due to:       Request from pharmacy:  Requested Prescriptions   Pending Prescriptions Disp Refills    allopurinol (ZYLOPRIM) 100 MG tablet [Pharmacy Med Name: Allopurinol 100 MG Oral Tablet] 90 tablet 0     Sig: TAKE 1 TABLET BY MOUTH ONCE DAILY *NEEDS TO HAVE LABS FOR FURTHER REFILLS*       Gout Agents Protocol Failed - 2/4/2025  2:15 PM        Failed - Has Uric Acid on file in past 12 months and value is less than 6     Recent Labs   Lab Test 01/02/24  1057   URIC 6.4*     If level is 6mg/dL or greater, ok to refill one time and refer to provider.           Failed - Has GFR on file in past 12 months and most recent value is normal        Passed - CBC on file in past 12 months     Recent Labs   Lab Test 12/02/24  1013   WBC 6.7   RBC 3.41*   HGB 10.2*   HCT 33.6*   PLT 72*     Component      Latest Ref Rng 12/2/2024  10:13 AM   Sodium      135 - 145 mmol/L 143    Potassium      3.4 - 5.3 mmol/L 3.9    Chloride      98 - 107 mmol/L 107    Carbon Dioxide (CO2)      22 - 29 mmol/L 25    Anion Gap      7 - 15 mmol/L 11    Glucose      70 - 99 mg/dL 94    Urea Nitrogen      8.0 - 23.0 mg/dL 27.1 (H)    Creatinine      0.51 - 0.95 mg/dL 1.69 (H)    GFR Estimate      >60 mL/min/1.73m2 29 (L)                  Passed - ALT on file in past 12 months     Recent Labs   Lab Test 11/07/24  0846   ALT 22             Passed - Medication is active on med list        Passed - Medication indicated for associated diagnosis     One of the following medications: colchicine is prescribed for one or more of the following conditions:     Amyloidosis   ulcer of mouth   Bechet's syndrome   Pericarditis   Peyronie's disease, psoriasis          Passed - Recent (12 mo) or future (90 days) visit  within the authorizing provider's specialty     The patient must have completed an in-person or virtual visit within the past 12 months or has a future visit scheduled within the next 90 days with the authorizing provider s specialty.  Urgent care and e-visits do not qualify as an office visit for this protocol.          Passed - Patient is age 18 or older     Refill protocol is for patient's aged 18 years and older          Passed - No active pregnancy on record     Protocol will fail if there is documentation pregnancy episode or positive test on file within the past 12 months          Passed - No positive pregnancy test in past year             Awa Dawn RN  [facility]/Red Flag Triage & Med Refill Team

## 2025-02-04 NOTE — TELEPHONE ENCOUNTER
Patient confirmed scheduled appointment:  Date: 2/7  Time: 12pm  Visit type: Lab  Provider: Ordered by PCP  Location: [facility] [place]

## 2025-02-06 ENCOUNTER — ANCILLARY PROCEDURE (OUTPATIENT)
Dept: CARDIOLOGY | Facility: CLINIC | Age: 89
End: 2025-02-06
Attending: INTERNAL MEDICINE
Payer: COMMERCIAL

## 2025-02-06 DIAGNOSIS — I49.5 SICK SINUS SYNDROME (H): ICD-10-CM

## 2025-02-06 PROCEDURE — 93294 REM INTERROG EVL PM/LDLS PM: CPT | Performed by: INTERNAL MEDICINE

## 2025-02-06 PROCEDURE — 93296 REM INTERROG EVL PM/IDS: CPT

## 2025-02-07 ENCOUNTER — TELEPHONE (OUTPATIENT)
Dept: CARDIOLOGY | Facility: CLINIC | Age: 89
End: 2025-02-07

## 2025-02-07 ENCOUNTER — LAB (OUTPATIENT)
Dept: LAB | Facility: CLINIC | Age: 89
End: 2025-02-07
Payer: COMMERCIAL

## 2025-02-07 DIAGNOSIS — D63.1 ANEMIA OF CHRONIC RENAL FAILURE, STAGE 4 (SEVERE) (H): ICD-10-CM

## 2025-02-07 DIAGNOSIS — N18.32 STAGE 3B CHRONIC KIDNEY DISEASE (H): ICD-10-CM

## 2025-02-07 DIAGNOSIS — I25.118 CORONARY ARTERY DISEASE OF NATIVE ARTERY OF NATIVE HEART WITH STABLE ANGINA PECTORIS: ICD-10-CM

## 2025-02-07 DIAGNOSIS — N18.4 CKD (CHRONIC KIDNEY DISEASE) STAGE 4, GFR 15-29 ML/MIN (H): ICD-10-CM

## 2025-02-07 DIAGNOSIS — M10.00 IDIOPATHIC GOUT, UNSPECIFIED CHRONICITY, UNSPECIFIED SITE: ICD-10-CM

## 2025-02-07 DIAGNOSIS — I10 ESSENTIAL HYPERTENSION: ICD-10-CM

## 2025-02-07 DIAGNOSIS — N18.4 ANEMIA OF CHRONIC RENAL FAILURE, STAGE 4 (SEVERE) (H): ICD-10-CM

## 2025-02-07 LAB
ALBUMIN SERPL BCG-MCNC: 4.1 G/DL (ref 3.5–5.2)
ANION GAP SERPL CALCULATED.3IONS-SCNC: 12 MMOL/L (ref 7–15)
BUN SERPL-MCNC: 39.1 MG/DL (ref 8–23)
CALCIUM SERPL-MCNC: 8.9 MG/DL (ref 8.8–10.4)
CHLORIDE SERPL-SCNC: 109 MMOL/L (ref 98–107)
CHOLEST SERPL-MCNC: 94 MG/DL
CREAT SERPL-MCNC: 1.83 MG/DL (ref 0.51–0.95)
EGFRCR SERPLBLD CKD-EPI 2021: 26 ML/MIN/1.73M2
FASTING STATUS PATIENT QL REPORTED: NO
FERRITIN SERPL-MCNC: 459 NG/ML (ref 11–328)
GLUCOSE SERPL-MCNC: 154 MG/DL (ref 70–99)
HCO3 SERPL-SCNC: 26 MMOL/L (ref 22–29)
HCT VFR BLD AUTO: 34.3 % (ref 35–47)
HDLC SERPL-MCNC: 41 MG/DL
HGB BLD-MCNC: 10.5 G/DL (ref 11.7–15.7)
IRON BINDING CAPACITY (ROCHE): 236 UG/DL (ref 240–430)
IRON SATN MFR SERPL: 14 % (ref 15–46)
IRON SERPL-MCNC: 34 UG/DL (ref 37–145)
LDLC SERPL CALC-MCNC: 32 MG/DL
NONHDLC SERPL-MCNC: 53 MG/DL
PHOSPHATE SERPL-MCNC: 3.6 MG/DL (ref 2.5–4.5)
POTASSIUM SERPL-SCNC: 4.1 MMOL/L (ref 3.4–5.3)
SODIUM SERPL-SCNC: 147 MMOL/L (ref 135–145)
TRIGL SERPL-MCNC: 105 MG/DL
URATE SERPL-MCNC: 4.7 MG/DL (ref 2.4–5.7)

## 2025-02-07 PROCEDURE — 85014 HEMATOCRIT: CPT | Performed by: PATHOLOGY

## 2025-02-07 PROCEDURE — 80069 RENAL FUNCTION PANEL: CPT | Performed by: PATHOLOGY

## 2025-02-07 PROCEDURE — 36415 COLL VENOUS BLD VENIPUNCTURE: CPT | Performed by: PATHOLOGY

## 2025-02-07 PROCEDURE — 84550 ASSAY OF BLOOD/URIC ACID: CPT | Performed by: PATHOLOGY

## 2025-02-07 PROCEDURE — 85018 HEMOGLOBIN: CPT | Performed by: PATHOLOGY

## 2025-02-07 PROCEDURE — 80061 LIPID PANEL: CPT | Performed by: PATHOLOGY

## 2025-02-07 PROCEDURE — 83550 IRON BINDING TEST: CPT | Performed by: PATHOLOGY

## 2025-02-07 PROCEDURE — 82728 ASSAY OF FERRITIN: CPT | Performed by: PATHOLOGY

## 2025-02-07 PROCEDURE — 83540 ASSAY OF IRON: CPT | Performed by: PATHOLOGY

## 2025-02-11 NOTE — TELEPHONE ENCOUNTER
RN returned call to patient leaving detailed VM [facility]    Rachel Dawn RN

## 2025-02-12 LAB
MDC_IDC_EPISODE_DTM: NORMAL
MDC_IDC_EPISODE_DURATION: 1 S
MDC_IDC_EPISODE_ID: 20
MDC_IDC_EPISODE_TYPE: NORMAL
MDC_IDC_LEAD_CONNECTION_STATUS: NORMAL
MDC_IDC_LEAD_CONNECTION_STATUS: NORMAL
MDC_IDC_LEAD_IMPLANT_DT: NORMAL
MDC_IDC_LEAD_IMPLANT_DT: NORMAL
MDC_IDC_LEAD_LOCATION: NORMAL
MDC_IDC_LEAD_LOCATION: NORMAL
MDC_IDC_LEAD_LOCATION_DETAIL_1: NORMAL
MDC_IDC_LEAD_LOCATION_DETAIL_1: NORMAL
MDC_IDC_LEAD_MFG: NORMAL
MDC_IDC_LEAD_MFG: NORMAL
MDC_IDC_LEAD_MODEL: NORMAL
MDC_IDC_LEAD_MODEL: NORMAL
MDC_IDC_LEAD_POLARITY_TYPE: NORMAL
MDC_IDC_LEAD_POLARITY_TYPE: NORMAL
MDC_IDC_LEAD_SERIAL: NORMAL
MDC_IDC_LEAD_SERIAL: NORMAL
MDC_IDC_LEAD_SPECIAL_FUNCTION: NORMAL
MDC_IDC_LEAD_SPECIAL_FUNCTION: NORMAL
MDC_IDC_MSMT_BATTERY_DTM: NORMAL
MDC_IDC_MSMT_BATTERY_REMAINING_LONGEVITY: 102 MO
MDC_IDC_MSMT_BATTERY_RRT_TRIGGER: 2.62
MDC_IDC_MSMT_BATTERY_STATUS: NORMAL
MDC_IDC_MSMT_BATTERY_VOLTAGE: 2.99 V
MDC_IDC_MSMT_LEADCHNL_RA_IMPEDANCE_VALUE: 285 OHM
MDC_IDC_MSMT_LEADCHNL_RA_IMPEDANCE_VALUE: 323 OHM
MDC_IDC_MSMT_LEADCHNL_RA_PACING_THRESHOLD_AMPLITUDE: 0.5 V
MDC_IDC_MSMT_LEADCHNL_RA_PACING_THRESHOLD_PULSEWIDTH: 0.4 MS
MDC_IDC_MSMT_LEADCHNL_RA_SENSING_INTR_AMPL: 1.75 MV
MDC_IDC_MSMT_LEADCHNL_RA_SENSING_INTR_AMPL: 1.75 MV
MDC_IDC_MSMT_LEADCHNL_RV_IMPEDANCE_VALUE: 380 OHM
MDC_IDC_MSMT_LEADCHNL_RV_IMPEDANCE_VALUE: 418 OHM
MDC_IDC_MSMT_LEADCHNL_RV_PACING_THRESHOLD_AMPLITUDE: 0.88 V
MDC_IDC_MSMT_LEADCHNL_RV_PACING_THRESHOLD_PULSEWIDTH: 0.4 MS
MDC_IDC_MSMT_LEADCHNL_RV_SENSING_INTR_AMPL: 8.12 MV
MDC_IDC_MSMT_LEADCHNL_RV_SENSING_INTR_AMPL: 8.12 MV
MDC_IDC_PG_IMPLANT_DTM: NORMAL
MDC_IDC_PG_MFG: NORMAL
MDC_IDC_PG_MODEL: NORMAL
MDC_IDC_PG_SERIAL: NORMAL
MDC_IDC_PG_TYPE: NORMAL
MDC_IDC_SESS_CLINIC_NAME: NORMAL
MDC_IDC_SESS_DTM: NORMAL
MDC_IDC_SESS_TYPE: NORMAL
MDC_IDC_SET_BRADY_AT_MODE_SWITCH_RATE: 171 {BEATS}/MIN
MDC_IDC_SET_BRADY_HYSTRATE: NORMAL
MDC_IDC_SET_BRADY_LOWRATE: 60 {BEATS}/MIN
MDC_IDC_SET_BRADY_MAX_SENSOR_RATE: 130 {BEATS}/MIN
MDC_IDC_SET_BRADY_MAX_TRACKING_RATE: 130 {BEATS}/MIN
MDC_IDC_SET_BRADY_MODE: NORMAL
MDC_IDC_SET_BRADY_PAV_DELAY_LOW: 180 MS
MDC_IDC_SET_BRADY_SAV_DELAY_LOW: 150 MS
MDC_IDC_SET_LEADCHNL_RA_PACING_AMPLITUDE: 1.5 V
MDC_IDC_SET_LEADCHNL_RA_PACING_ANODE_ELECTRODE_1: NORMAL
MDC_IDC_SET_LEADCHNL_RA_PACING_ANODE_LOCATION_1: NORMAL
MDC_IDC_SET_LEADCHNL_RA_PACING_CAPTURE_MODE: NORMAL
MDC_IDC_SET_LEADCHNL_RA_PACING_CATHODE_ELECTRODE_1: NORMAL
MDC_IDC_SET_LEADCHNL_RA_PACING_CATHODE_LOCATION_1: NORMAL
MDC_IDC_SET_LEADCHNL_RA_PACING_POLARITY: NORMAL
MDC_IDC_SET_LEADCHNL_RA_PACING_PULSEWIDTH: 0.4 MS
MDC_IDC_SET_LEADCHNL_RA_SENSING_ANODE_ELECTRODE_1: NORMAL
MDC_IDC_SET_LEADCHNL_RA_SENSING_ANODE_LOCATION_1: NORMAL
MDC_IDC_SET_LEADCHNL_RA_SENSING_CATHODE_ELECTRODE_1: NORMAL
MDC_IDC_SET_LEADCHNL_RA_SENSING_CATHODE_LOCATION_1: NORMAL
MDC_IDC_SET_LEADCHNL_RA_SENSING_POLARITY: NORMAL
MDC_IDC_SET_LEADCHNL_RA_SENSING_SENSITIVITY: 0.3 MV
MDC_IDC_SET_LEADCHNL_RV_PACING_AMPLITUDE: 2 V
MDC_IDC_SET_LEADCHNL_RV_PACING_ANODE_ELECTRODE_1: NORMAL
MDC_IDC_SET_LEADCHNL_RV_PACING_ANODE_LOCATION_1: NORMAL
MDC_IDC_SET_LEADCHNL_RV_PACING_CAPTURE_MODE: NORMAL
MDC_IDC_SET_LEADCHNL_RV_PACING_CATHODE_ELECTRODE_1: NORMAL
MDC_IDC_SET_LEADCHNL_RV_PACING_CATHODE_LOCATION_1: NORMAL
MDC_IDC_SET_LEADCHNL_RV_PACING_POLARITY: NORMAL
MDC_IDC_SET_LEADCHNL_RV_PACING_PULSEWIDTH: 0.4 MS
MDC_IDC_SET_LEADCHNL_RV_SENSING_ANODE_ELECTRODE_1: NORMAL
MDC_IDC_SET_LEADCHNL_RV_SENSING_ANODE_LOCATION_1: NORMAL
MDC_IDC_SET_LEADCHNL_RV_SENSING_CATHODE_ELECTRODE_1: NORMAL
MDC_IDC_SET_LEADCHNL_RV_SENSING_CATHODE_LOCATION_1: NORMAL
MDC_IDC_SET_LEADCHNL_RV_SENSING_POLARITY: NORMAL
MDC_IDC_SET_LEADCHNL_RV_SENSING_SENSITIVITY: 0.9 MV
MDC_IDC_SET_ZONE_DETECTION_INTERVAL: 350 MS
MDC_IDC_SET_ZONE_DETECTION_INTERVAL: 400 MS
MDC_IDC_SET_ZONE_STATUS: NORMAL
MDC_IDC_SET_ZONE_STATUS: NORMAL
MDC_IDC_SET_ZONE_TYPE: NORMAL
MDC_IDC_SET_ZONE_VENDOR_TYPE: NORMAL
MDC_IDC_STAT_AT_BURDEN_PERCENT: 0.1 %
MDC_IDC_STAT_AT_DTM_END: NORMAL
MDC_IDC_STAT_AT_DTM_START: NORMAL
MDC_IDC_STAT_BRADY_AP_VP_PERCENT: 0.15 %
MDC_IDC_STAT_BRADY_AP_VS_PERCENT: 77.83 %
MDC_IDC_STAT_BRADY_AS_VP_PERCENT: 0.02 %
MDC_IDC_STAT_BRADY_AS_VS_PERCENT: 21.99 %
MDC_IDC_STAT_BRADY_DTM_END: NORMAL
MDC_IDC_STAT_BRADY_DTM_START: NORMAL
MDC_IDC_STAT_BRADY_RA_PERCENT_PACED: 78.32 %
MDC_IDC_STAT_BRADY_RV_PERCENT_PACED: 0.18 %
MDC_IDC_STAT_EPISODE_RECENT_COUNT: 0
MDC_IDC_STAT_EPISODE_RECENT_COUNT: 1
MDC_IDC_STAT_EPISODE_RECENT_COUNT_DTM_END: NORMAL
MDC_IDC_STAT_EPISODE_RECENT_COUNT_DTM_START: NORMAL
MDC_IDC_STAT_EPISODE_TOTAL_COUNT: 0
MDC_IDC_STAT_EPISODE_TOTAL_COUNT: 0
MDC_IDC_STAT_EPISODE_TOTAL_COUNT: 1
MDC_IDC_STAT_EPISODE_TOTAL_COUNT: 16
MDC_IDC_STAT_EPISODE_TOTAL_COUNT: 2
MDC_IDC_STAT_EPISODE_TOTAL_COUNT_DTM_END: NORMAL
MDC_IDC_STAT_EPISODE_TOTAL_COUNT_DTM_START: NORMAL
MDC_IDC_STAT_EPISODE_TYPE: NORMAL
MDC_IDC_STAT_TACHYTHERAPY_RECENT_DTM_END: NORMAL
MDC_IDC_STAT_TACHYTHERAPY_RECENT_DTM_START: NORMAL
MDC_IDC_STAT_TACHYTHERAPY_TOTAL_DTM_END: NORMAL
MDC_IDC_STAT_TACHYTHERAPY_TOTAL_DTM_START: NORMAL

## 2025-02-13 ENCOUNTER — TELEPHONE (OUTPATIENT)
Dept: CARDIOLOGY | Facility: CLINIC | Age: 89
End: 2025-02-13
Payer: COMMERCIAL

## 2025-02-13 NOTE — TELEPHONE ENCOUNTER
Patient confirmed scheduled appointment:  Date: 07/28/25  Time: 10:20am   Visit type: rtn MAIRA   Provider: ALEX   Location: [facility] [place]   Testing/imaging: LABS, ECHO   Additional notes: N/A

## 2025-02-17 ENCOUNTER — OFFICE VISIT (OUTPATIENT)
Dept: NEPHROLOGY | Facility: CLINIC | Age: 89
End: 2025-02-17
Payer: COMMERCIAL

## 2025-02-17 ENCOUNTER — TELEPHONE (OUTPATIENT)
Dept: INTERNAL MEDICINE | Facility: CLINIC | Age: 89
End: 2025-02-17

## 2025-02-17 VITALS — DIASTOLIC BLOOD PRESSURE: 75 MMHG | HEART RATE: 85 BPM | OXYGEN SATURATION: 99 % | SYSTOLIC BLOOD PRESSURE: 146 MMHG

## 2025-02-17 DIAGNOSIS — N18.4 ANEMIA DUE TO STAGE 4 CHRONIC KIDNEY DISEASE (H): ICD-10-CM

## 2025-02-17 DIAGNOSIS — I10 ESSENTIAL HYPERTENSION: ICD-10-CM

## 2025-02-17 DIAGNOSIS — N18.4 CKD (CHRONIC KIDNEY DISEASE) STAGE 4, GFR 15-29 ML/MIN (H): Primary | ICD-10-CM

## 2025-02-17 DIAGNOSIS — E87.20 METABOLIC ACIDOSIS: ICD-10-CM

## 2025-02-17 DIAGNOSIS — E87.6 HYPOKALEMIA: ICD-10-CM

## 2025-02-17 DIAGNOSIS — R80.9 ALBUMINURIA: ICD-10-CM

## 2025-02-17 DIAGNOSIS — R80.1 PERSISTENT PROTEINURIA: ICD-10-CM

## 2025-02-17 DIAGNOSIS — G89.18 ACUTE POST-OPERATIVE PAIN: ICD-10-CM

## 2025-02-17 DIAGNOSIS — D63.1 ANEMIA DUE TO STAGE 4 CHRONIC KIDNEY DISEASE (H): ICD-10-CM

## 2025-02-17 PROCEDURE — 99214 OFFICE O/P EST MOD 30 MIN: CPT | Performed by: PHYSICIAN ASSISTANT

## 2025-02-17 RX ORDER — METHOCARBAMOL 500 MG/1
500 TABLET, FILM COATED ORAL EVERY 6 HOURS PRN
Qty: 30 TABLET | Refills: 5 | Status: SHIPPED | OUTPATIENT
Start: 2025-02-17

## 2025-02-17 NOTE — PROGRESS NOTES
Nephrology Progress Note   2/17/2025      Assessment and Plan:   89 year old female with history of long standing HTN, CAD s/p multiple stents, who presents for followup of CKD stage 4, baseline SCr 1.8-2.2 mg/dL now with proteinuria. She had another angiogram with stent done June 2022 and November 2023 (she now has 10 stents). She had watchman in August 2024 for her atrial fibrillation. She had GI bleed in summer 2024    1. CKD stage 4- baseline SCr 1.8-2.2mg/ dL, eGFR 25-29 ml/min. Mild/ minimal proteinuria now up to > 1gram, due to ? Uncontrolled BP  - recent urine albumin/cr 1247 mg/g, improved from 1939 on 10/8, was down to 243 mg/g in 5/2024.   Her swelling was previously occasional. She's had more swelling since taking amlodipine 10 mg daily and had been taking furosemide 40 mg BID. Now only taking amlodipine 5 mg daily and 40mg once a day of lasix. Swelling is OK , takes 20mg in PM if needed.  - Screatinine stable at 1.6, up to 1.7 on lisinopril 2.5 mg which was started in October   - Has proteinuria - may be due to BP not ideally controlled.   - BP 130s/70s -140s at home.  - monitor labs, relatively stable at this time  - started lisinopril 2.5 mg daily, cannot see that she has been on this, given proteinuria and CKD- but has had a cough since November, a dry cough, thus will switch her to losartan 25mg bid.  Repeat labs today and in February  - will check some blood pressure at home    2. Electrolytes/Acid Base status- normal.   Hypokalemia- - also taking KCl; ER 20 mEq daily, may stop this if K higher with adding ACE-I/ ARB  Hypernatremia- increase hydration  Metabolic acidosis- bicarbonate was lower at 17, now up to 26; continue sodium bicarb 650 mg BID, consider lowering to once a day if > 25    3. Hypertension/Volume status-  She is 130-140s at home,, and in office today 146/75  She is on amlodipine 5 mg daily, isosorbide mononitrate 90 mg daily, metoprolol 50 mg BID, furosemide 40 mg daily, an extra  20 mg in pm as needed (rarely uses), losartan 25 mg BID  started lisinopril 5 mg daily and had a dry cough. Switched to losartan.   Went to ER in November for cough with blood - no etiology found-   - continue checking BP at home    - follows with cardiology for Afib, intolerance to AC due to GIB, s/p Watchman procedure on 8/4/2024. Off eliquis, now taking plavix; ASA stopped after bleeding   -patient reports history of reaction/intolerance to Spironolactone, Chlorthalidone, Clonidine and Terazosin in the past   - had new stent to RCA placed on 7/17/23 and another in November 2023  - recently met with cardiology and will be repeating ECHO  - Last ECHO 8/29/2024, limited and compared to 8/8/2024  Interpretation Summary  Left ventricular size, wall motion and function are normal. The ejection  fraction is 60-65%.  Right ventricular function, chamber size, wall motion, and thickness are  normal.  Mild to moderate mitral insufficiency is present.  Mild to moderate tricuspid insufficiency is present.  Dilation of the inferior vena cava is present with abnormal respiratory  variation in diameter. No pericardial effusion is present.    4. Anemia- hgb at 10.5, up from 8.2 in August 2024, from 7.5 on 7/5/24.  seen in ED on 7/5, Hgb 6.9 was transfused. Recent iron sat 14%,  - history of transfusions- she was seen by hematology and GI. Recommended to stop oral iron and start IV iron due to potential for GI bleeding and difficulty in knowing if black stools are from iron or bleeding.   - continue follow up with anemia clinic      5. BMD  - calcium 8.9, and phosphorus 3.6 were normal  -secondary hyperparathyroidism-PTH was 93  - vit D is high normal at 60- repeat yearly.     6. Elevated uric acid  Uric acid 4.7, from 6.4 from 10.4 (7/2016)  - continue allopurinol    Assessment and plan was discussed with patient and she voiced her understanding and agreement.    #Disposition: labs in 3 months, follow up in July with Dr. Martin  as planned.    Reason for Visit:  Tessa Mansfield is an 89 year old female with HTN and CAD, who presents for CKD management.     HPI:  She is a pleasant female with PMMHx significant for stage III CKD presumed secondary to hypertensive nephrosclerosis.Her renal function has been  relatively stable, ranging from 1.8-2.2 mg/dL over the years. She has history of CAD s/p multiple stents  (8 stents in all) since 2004 and a pacemaker in 2007. She follows with cardiologist Dr Santoyo and Dr Zhu.    She had elected stent placed in RCA on 7/17/23 to help with angina.     She was admitted from 7/19 to 7/23/23 for melena secondary to bleeding colonic angiodysplastic lesion, acute on chronic anemia. S/p repair.     She has been bleeding again and was admitted from 7/5-7/6/24 with acute on chronic anemia and had another transfusion. She has been referred to anemia clinic.     She has been taking furosemide once a day for a while, takes 20 mg in the afternoon as needed.    She is eating a little better. Per patient she has lost ~ 20 lbs since March, now stabilized.   She denies any further diarrhea no n/v.     She had another angiogram in November 2023, she was having chest pain (back pain ) with exertion which resolved after stent.    She is s/p watchman procedure on 8/4, Watchman procedure complicated by new pericardial effusion without tamponade. S/p pericardiocentesis 8/8 with 220 ml off. There was no contrast extravasation noted. She was admitted for post procedural monitoring with pericardial drain in place. Ultimately the drain was removed and she was discharged home.     She is following closely with cardiology and will be having another ECHO in July.     Her son was ?diagnosed with Albina Gehrig but now [facility] told him it is not ALS- relieved. Other son has a pancreatic lesion being monitored.       She is feeling Ok but did go to ER in November, due to cough with blood tinged sputum. No etiology was found. She is  now off ASA and only on plavix.   BP's have been upper 130s, and was started on lisinopril previous visit. She had ongoing dry cough so was switched to losartan last visit in December. Her cough has improved. Her BP's are okay.    Home BP: 130s/    Baseline Cr: 1.8-2.2    ROS:  A comprehensive review of systems was obtained and negative, except as noted in the HPI or PMH.    Active Medical Problems:  Patient Active Problem List   Diagnosis    Degeneration of cervical intervertebral disc    Nonallopathic lesion of cervical region    Nonallopathic lesion of thoracic region    Coronary artery disease of native artery of native heart with stable angina pectoris    Dizziness and giddiness    Edema    Esophageal reflux    Gout    Essential hypertension    Obstructive sleep apnea    Osteomalacia    Post-menopausal osteoporosis    Cardiac Pacemaker- Medtronic, dual chamber- NOT dependant    Transient complete heart block (H)    Sinus node dysfunction (H)    Disturbance of skin sensation    NSTEMI (non-ST elevated myocardial infarction) (H)    Arrhythmia    Sick sinus syndrome (H)    Non-rheumatic mitral regurgitation    Non-rheumatic tricuspid valve insufficiency    Anemia of chronic renal failure, stage 4 (severe) (H)    Status post coronary angiogram    Secondary renal hyperparathyroidism    Thrombocytopenia    Renal hypertension    Acute on chronic diastolic (congestive) heart failure (H)    Chest pain, unspecified type    Long term (current) use of anticoagulants    Melena    General weakness    S/P coronary artery stent placement    Antiplatelet or antithrombotic long-term use    Anemia, unspecified type    Aftercare following surgery of the musculoskeletal system    Closed right femoral fracture (H)    Hyperlipidemia    Hypokalemia    Orthostatic hypotension    Osteoarthritis of right patellofemoral joint    Postoperative anemia due to acute blood loss    Chronic anemia    ACP (advance care planning)    Chronic kidney  disease, stage 3 (H)    Biomechanical lesion, unspecified    GERD (gastroesophageal reflux disease)    Weakness    Acute cystitis with hematuria    Leukocytosis, unspecified type    Small bowel obstruction (H)    UGIB (upper gastrointestinal bleed)    Paroxysmal atrial fibrillation (H)    Stage 3a chronic kidney disease (H)    Symptomatic anemia    Gastrointestinal hemorrhage, unspecified gastrointestinal hemorrhage type    Atrial fibrillation (H)    Restless legs    Hypertensive heart and kidney disease    History of myocardial infarction    Chronic cough       Personal Hx:   Social History     Socioeconomic History    Marital status:      Spouse name: Not on file    Number of children: 4    Years of education: Not on file    Highest education level: Not on file   Occupational History     Employer: [employer]   Tobacco Use    Smoking status: Former     Current packs/day: 0.30     Average packs/day: 0.3 packs/day for 15.0 years (4.5 ttl pk-yrs)     Types: Cigarettes    Smokeless tobacco: Never    Tobacco comments:     Quit 35+ years ago   Vaping Use    Vaping status: Never Used   Substance and Sexual Activity    Alcohol use: Not on file    Drug use: No    Sexual activity: Not Currently     Partners: Male     Birth control/protection: Post-menopausal   Other Topics Concern     Service Not Asked    Blood Transfusions Yes    Caffeine Concern Not Asked    Occupational Exposure Not Asked    Hobby Hazards Not Asked    Sleep Concern Not Asked    Stress Concern Not Asked    Weight Concern Not Asked    Special Diet Not Asked    Back Care Not Asked    Exercise No    Bike Helmet Not Asked    Seat Belt Not Asked    Self-Exams Not Asked    Parent/sibling w/ CABG, MI or angioplasty before 65F 55M? Not Asked   Social History Narrative    Not on file     Social Drivers of Health     Financial Resource Strain: Low Risk  (11/15/2023)    Financial Resource Strain     Within the past 12 months, have you or  your family members you live with been unable to get utilities (heat, electricity) when it was really needed?: No   Food Insecurity: Low Risk  (11/15/2023)    Food Insecurity     Within the past 12 months, did you worry that your food would run out before you got money to buy more?: No     Within the past 12 months, did the food you bought just not last and you didn t have money to get more?: No   Transportation Needs: Low Risk  (11/15/2023)    Transportation Needs     Within the past 12 months, has lack of transportation kept you from medical appointments, getting your medicines, non-medical meetings or appointments, work, or from getting things that you need?: No   Physical Activity: Not on file   Stress: Not on file   Social Connections: Not on file   Interpersonal Safety: Low Risk  (10/8/2024)    Interpersonal Safety     Do you feel physically and emotionally safe where you currently live?: Yes     Within the past 12 months, have you been hit, slapped, kicked or otherwise physically hurt by someone?: No     Within the past 12 months, have you been humiliated or emotionally abused in other ways by your partner or ex-partner?: No   Housing Stability: Low Risk  (11/15/2023)    Housing Stability     Do you have housing? : Yes     Are you worried about losing your housing?: No       Allergies:  Allergies   Allergen Reactions    Shrimp Swelling    Codeine Sulfate Itching    Indomethacin      Other Reaction(s): Not available    Levofloxacin Hemihydrate      Other Reaction(s): Not available    Morphine Sulfate      Other Reaction(s): Not available    Oxycodone Other (See Comments)    Shrimp Extract Swelling    Sulfamethoxazole W/Trimethoprim      Other Reaction(s): Not available    Chlorthalidone Nausea and Vomiting     Other Reaction(s): Not available    Clarithromycin      Other Reaction(s): Not available    Clonidine      Other Reaction(s): Not available    Darvon [Propoxyphene Hcl]     Gabapentin Confusion and  "Fatigue     \"felt drunk\"    Other Reaction(s): Not available    Hydromorphone Hallucination and Visual Disturbance     Tolerated in April 2024 hospital admissions    Other Reaction(s): Not available    Indomethacin     Percocet [Oxycodone-Acetaminophen] Hallucination    Pregabalin Fatigue and Itching    Simvastatin Palpitations and Cramps     Muscle weakness, leg cramping    Spironolactone      Dehydrated    Other Reaction(s): Not available    Terazosin      Other Reaction(s): Not available       Current Outpatient Medications   Medication Sig Dispense Refill    acetaminophen (TYLENOL) 325 MG tablet Take 975 mg by mouth nightly as needed for pain      allopurinol (ZYLOPRIM) 100 MG tablet TAKE 1 TABLET BY MOUTH ONCE DAILY *NEEDS TO HAVE LABS FOR FURTHER REFILLS* 90 tablet 0    amLODIPine (NORVASC) 5 MG tablet Take 1 tablet (5 mg) by mouth daily. 90 tablet 3    BUDESONIDE IN Inhale into the lungs.      clopidogrel (PLAVIX) 75 MG tablet Take 1 tablet (75 mg) by mouth daily. 90 tablet 3    fluticasone-salmeterol (ADVAIR) 250-50 MCG/ACT inhaler INHALE 1 DOSE BY MOUTH TWICE DAILY 60 each 8    furosemide (LASIX) 40 MG tablet Take 1 tablet (40 mg) by mouth daily May take an additional 20 mg at noon as needed for swelling. 90 tablet 2    isosorbide mononitrate (IMDUR) 30 MG 24 hr tablet Take 3 tablets (90 mg) by mouth daily. 90 tablet 11    losartan (COZAAR) 25 MG tablet Take 1 tablet (25 mg) by mouth 2 times daily. 180 tablet 3    methocarbamol (ROBAXIN) 500 MG tablet Take 1 tablet (500 mg) by mouth every 6 hours as needed for muscle spasms 30 tablet 0    metoprolol tartrate (LOPRESSOR) 25 MG tablet Take 2 tablets (50 mg) by mouth 2 times daily. 180 tablet 3    Multiple Vitamins-Minerals (PRESERVISION AREDS 2+MULTI VIT PO) Take 1 capsule by mouth 2 times daily      omeprazole (PRILOSEC) 40 MG DR capsule Take 1 capsule (40 mg) by mouth daily. 90 capsule 3    potassium chloride ER (K-TAB) 20 MEQ CR tablet Take 1 tablet (20 " mEq) by mouth daily 90 tablet 3    Psyllium (METAMUCIL PO) Take by mouth.      rosuvastatin (CRESTOR) 20 MG tablet Take 1 tablet (20 mg) by mouth daily. 90 tablet 1    sodium bicarbonate 650 MG tablet Take 1 tablet (650 mg) by mouth 2 times daily 60 tablet 11    timolol maleate (TIMOPTIC) 0.5 % ophthalmic solution INSTILL 1 DROP INTO EACH EYE ONCE DAILY IN THE MORNING      vitamin B-12 (CYANOCOBALAMIN) 500 MCG tablet Take 1 tablet by mouth daily      vitamin D3 25 mcg (1000 units) tablet Take 1 tablet (25 mcg) by mouth every other day 90 tablet 3     No current facility-administered medications for this visit.       Vitals:  BP (!) 146/75 (BP Location: Right arm, Patient Position: Sitting, Cuff Size: Adult Regular)   Pulse 85   SpO2 99%     Exam:  General: awake and alert, no acute distress  Skin: warm and dry, no visible rash  Heart: Reg rate and rhythm  Lungs: CTA  Extremities: no significant LE edema     Results:  Last Comprehensive Metabolic Panel:  Sodium   Date Value Ref Range Status   02/07/2025 147 (H) 135 - 145 mmol/L Final   08/26/2020 141 133 - 144 mmol/L Final     Potassium   Date Value Ref Range Status   02/07/2025 4.1 3.4 - 5.3 mmol/L Final   11/21/2022 4.8 3.4 - 5.3 mmol/L Final   08/26/2020 4.4 3.4 - 5.3 mmol/L Final     Potassium POCT   Date Value Ref Range Status   08/08/2024 3.5 3.4 - 5.3 mmol/L Final     Comment:     CRITICAL RESULTS NOTED BY CCL and MD     Chloride   Date Value Ref Range Status   02/07/2025 109 (H) 98 - 107 mmol/L Final   11/21/2022 110 (H) 94 - 109 mmol/L Final   08/26/2020 111 (H) 94 - 109 mmol/L Final     Carbon Dioxide   Date Value Ref Range Status   08/26/2020 24 20 - 32 mmol/L Final     Carbon Dioxide (CO2)   Date Value Ref Range Status   02/07/2025 26 22 - 29 mmol/L Final   11/21/2022 24 20 - 32 mmol/L Final     Anion Gap   Date Value Ref Range Status   02/07/2025 12 7 - 15 mmol/L Final   11/21/2022 6 3 - 14 mmol/L Final   08/26/2020 8 3 - 14 mmol/L Final     Glucose    Date Value Ref Range Status   02/07/2025 154 (H) 70 - 99 mg/dL Final   11/21/2022 79 70 - 99 mg/dL Final   08/26/2020 84 70 - 99 mg/dL Final     GLUCOSE BY METER POCT   Date Value Ref Range Status   08/09/2024 182 (H) 70 - 99 mg/dL Final     Urea Nitrogen   Date Value Ref Range Status   02/07/2025 39.1 (H) 8.0 - 23.0 mg/dL Final   11/21/2022 35 (H) 7 - 30 mg/dL Final   08/26/2020 41 (H) 7 - 30 mg/dL Final     Creatinine   Date Value Ref Range Status   02/07/2025 1.83 (H) 0.51 - 0.95 mg/dL Final   08/26/2020 1.70 (H) 0.52 - 1.04 mg/dL Final     GFR Estimate   Date Value Ref Range Status   02/07/2025 26 (L) >60 mL/min/1.73m2 Final     Comment:     eGFR calculated using 2021 CKD-EPI equation.   08/26/2020 27 (L) >60 mL/min/[1.73_m2] Final     Comment:     Non  GFR Calc  Starting 12/18/2018, serum creatinine based estimated GFR (eGFR) will be   calculated using the Chronic Kidney Disease Epidemiology Collaboration   (CKD-EPI) equation.       Calcium   Date Value Ref Range Status   02/07/2025 8.9 8.8 - 10.4 mg/dL Final   08/26/2020 9.0 8.5 - 10.1 mg/dL Final       Last Basic Metabolic Panel:  Lab Results   Component Value Date     11/08/2017      Lab Results   Component Value Date    POTASSIUM 3.5 11/08/2017     Lab Results   Component Value Date    CHLORIDE 104 11/08/2017     Lab Results   Component Value Date    ALEXANDRO 8.8 11/08/2017     Lab Results   Component Value Date    CO2 26 11/08/2017     Lab Results   Component Value Date    BUN 54 11/08/2017     Lab Results   Component Value Date    CR 2.36 11/08/2017     Lab Results   Component Value Date    GLC 88 11/08/2017     Laura Llanes PA-C    Visit length 15 minutes. An additional 15 minutes were spent on date of service in chart review, documentation, and other activities as noted.

## 2025-02-17 NOTE — TELEPHONE ENCOUNTER
Refilled. Pharmacy will contact the pt when it is ready to .    [facility]  ----------------------------------------------------------------------------      ----- Message from Pedro Gomez sent at 2/17/2025  2:47 PM CST -----  Let's ok this w/ five refills  ----- Message -----  From: Laura Llanes PA-C  Sent: 2/17/2025   2:32 PM CST  To: Pedro Gomez MD    Hi Dr. Gomez,     I saw Tessa in nephrology today. She is hoping to have methocarbamol rx renewed. She takes it nightly. I told her I would send the message.     Thanks  Laura

## 2025-02-17 NOTE — PATIENT INSTRUCTIONS
Will schedule iron infusions .  Avoid ibuprofen/aleve.   Check blood pressure daily, keep log.   Increase water intake, follow low sodium diet.   Labs in 3 months and follow up with Dr. Martin as planned.

## 2025-03-10 ENCOUNTER — PATIENT OUTREACH (OUTPATIENT)
Dept: CARE COORDINATION | Facility: CLINIC | Age: 89
End: 2025-03-10
Payer: COMMERCIAL

## 2025-03-10 DIAGNOSIS — I25.118 CORONARY ARTERY DISEASE OF NATIVE ARTERY OF NATIVE HEART WITH STABLE ANGINA PECTORIS: ICD-10-CM

## 2025-03-10 NOTE — PROGRESS NOTES
Anemia Management Note - Reminder     Follow-up with anemia management service:    Tessa is due to have her Anemia Labs drawn. LVM for Tessa.         Latest Ref Rng & Units 8/15/2024 8/29/2024 10/8/2024 10/25/2024 11/7/2024 12/2/2024 2/7/2025   Anemia   Hemoglobin 11.7 - 15.7 g/dL 8.3  9.7  10.9  10.1  10.7  10.2  10.5    TSAT 15 - 46 %  19  26  27   19  14    Ferritin 11 - 328 ng/mL  1,036  777  611   558  459        Orders needed to be renewed (for next follow-up date) in EPIC: None       Follow-up call date: 3/19/25    Barbara Dimas RN   Anemia Services  [facility]  [email or web address]   Office : [phone]  Fax: [phone]

## 2025-03-12 RX ORDER — ROSUVASTATIN CALCIUM 20 MG/1
20 TABLET, COATED ORAL DAILY
Qty: 90 TABLET | Refills: 0 | Status: SHIPPED | OUTPATIENT
Start: 2025-03-12

## 2025-03-19 ENCOUNTER — PATIENT OUTREACH (OUTPATIENT)
Dept: CARE COORDINATION | Facility: CLINIC | Age: 89
End: 2025-03-19
Payer: COMMERCIAL

## 2025-03-19 NOTE — PROGRESS NOTES
Anemia Management Note - Reminder     Follow-up with anemia management service:    2nd VM left for [facility]. Due to have Anemia Labs drawn.         Latest Ref Rng & Units 8/15/2024 8/29/2024 10/8/2024 10/25/2024 11/7/2024 12/2/2024 2/7/2025   Anemia   Hemoglobin 11.7 - 15.7 g/dL 8.3  9.7  10.9  10.1  10.7  10.2  10.5    TSAT 15 - 46 %  19  26  27   19  14    Ferritin 11 - 328 ng/mL  1,036  777  611   884 742          Follow-up call date: 4/17/25    Barbara Dimas RN   Anemia Services  [facility]  [email or web address]   Office : [phone]  Fax: [phone]       I do not see that I ordered this. Probably her endocrinologist?  Maybe call patient and ask here where she got the order? Thanks!

## 2025-03-29 ENCOUNTER — HOSPITAL ENCOUNTER (EMERGENCY)
Facility: CLINIC | Age: 89
Discharge: HOME | End: 2025-03-29
Attending: EMERGENCY MEDICINE | Admitting: EMERGENCY MEDICINE
Payer: COMMERCIAL

## 2025-03-29 ENCOUNTER — APPOINTMENT (OUTPATIENT)
Dept: CT IMAGING | Facility: CLINIC | Age: 89
End: 2025-03-29
Attending: EMERGENCY MEDICINE
Payer: COMMERCIAL

## 2025-03-29 VITALS
WEIGHT: 140 LBS | RESPIRATION RATE: 16 BRPM | BODY MASS INDEX: 22.5 KG/M2 | HEART RATE: 65 BPM | HEIGHT: 66 IN | DIASTOLIC BLOOD PRESSURE: 70 MMHG | SYSTOLIC BLOOD PRESSURE: 160 MMHG | OXYGEN SATURATION: 98 % | TEMPERATURE: 97.8 F

## 2025-03-29 DIAGNOSIS — M54.50 ACUTE BILATERAL LOW BACK PAIN WITHOUT SCIATICA: Primary | ICD-10-CM

## 2025-03-29 PROCEDURE — 99284 EMERGENCY DEPT VISIT MOD MDM: CPT | Mod: 25 | Performed by: EMERGENCY MEDICINE

## 2025-03-29 PROCEDURE — 99283 EMERGENCY DEPT VISIT LOW MDM: CPT | Performed by: EMERGENCY MEDICINE

## 2025-03-29 PROCEDURE — 72131 CT LUMBAR SPINE W/O DYE: CPT

## 2025-03-29 PROCEDURE — 72131 CT LUMBAR SPINE W/O DYE: CPT | Mod: 26 | Performed by: STUDENT IN AN ORGANIZED HEALTH CARE EDUCATION/TRAINING PROGRAM

## 2025-03-29 RX ORDER — TRAMADOL HYDROCHLORIDE 50 MG/1
50 TABLET ORAL EVERY 6 HOURS PRN
Qty: 10 TABLET | Refills: 0 | Status: ON HOLD | OUTPATIENT
Start: 2025-03-29 | End: 2025-04-01

## 2025-03-29 RX ORDER — LIDOCAINE 50 MG/G
1 PATCH TOPICAL EVERY 24 HOURS
Qty: 10 PATCH | Refills: 0 | Status: ON HOLD | OUTPATIENT
Start: 2025-03-29

## 2025-03-29 ASSESSMENT — ACTIVITIES OF DAILY LIVING (ADL)
ADLS_ACUITY_SCORE: 57

## 2025-03-29 NOTE — DISCHARGE INSTRUCTIONS
Please make an appointment to follow up with Your Primary Care Provider and Primary Care Center (phone: [phone]) in 5-10 days even if entirely better.

## 2025-03-29 NOTE — ED TRIAGE NOTES
"Pt BIBA from home, pt fell 2 days ago and hit her head. Pt was seen for this at [facility] and CT was negative. Does take Plavix.   She received stitches on forehead and L hand.     Yesterday pt began experiencing \"15/10\" lower back pain. Now rates it \"12/10\".    650mg Tylenol given by EMS.     Pmhx: Has heart pacer     BP (!) 171/82   Pulse 68   Temp 97.8  F (36.6  C) (Oral)   Resp 16   SpO2 100%     "

## 2025-03-29 NOTE — ED PROVIDER NOTES
ED Provider Note  [facility]      History     Chief Complaint   Patient presents with    Back Pain     HPI  Tessa Mansfield is a 89 year old female who has a past history of HTN, CKD4, CAD s/p multiple stents, and Afib s/p Watchman on 8/4/24 anticoagulated on Plavix who presents to the ED for low back pain.  Patient complains of low back pain which she describes as soreness across her entire low back that started yesterday.  Patient reports pain is worse with movement.  Patient tried heat at home which seemed to help.  Patient received Tylenol prior to arrival by EMS.  Patient does report that she fell a few days ago on Tuesday.  Patient was seen in the emergency department that day on 3/25/2025 as she fell on her face.  Patient had CT imaging of her head and cervical spine which were unremarkable, and had lacerations repaired.  Patient denied any back pain at the time.  Patient typically walks with a walker and cane.  Patient denies any other trauma or injury, no heavy lifting.  Patient denies any fever, chills, chest pain, shortness of breath, abdominal pain, dysuria, hematuria.  Denies any lower extremity weakness, paresthesias, bladder or bowel incontinence or retention.  No other complaints.    Past Medical History  Past Medical History:   Diagnosis Date    CAD (coronary artery disease)     CAD s/p PCI+BMS to MRCA 10/2002, PCI to mLCX 2/2003, PCI+DESx2 to pLAD 11/2007, PCI+DESx1 to dRCA 12/2007, PCI+ALVAREZ to RPDA 7/2013; on indefinite DAPT  10/27/2002    10/27/2002: AMI s/p PCI+BMS (3.5x18mm Bx velocity) to mRCA 2/5/2003: Back pain; PCI+stent to mLCX c/b distal embolization/slow flow 4/11/2003: Unstable angina; PCI+stent (Hepacoat velocity) to mLAD 11/27/2007: PCI+DESx2 to pLAD (IVUS w/ calcification of LMCA and ulcerated plaque pLAD, 80% dRCA; also had 80% dLCX, too small to stent) 12/11/2007: Staged PCI. PCI+DESx1 (3.5x13mm Cypher) to dRCA (indefinite DAPT recommended at this time)  6/27/2008: Angina. mLCX stent 70% ISR. LAD and RCA stents patent. Myocardium at risk from LCX felt to be small, medical management preferred to PCI. 11/10/2009: Angina. Findings unchanged from 6/27/2008, FFR LAD 0.90. Medical management recommended. 7/23/2013: Unstable angina; PCI+ALVAREZ to mPDA. Diagnostic findings: LMCA 40% distal. LAD: pLAD stents patent mLAD 30% ISR dLAD 50% diffuse, D2 diffuse disease. LCX 80% mid ISR. RCA diffuse <30% mid, RPLAs diffuse disease, RPDA 100% occlusion.      Cardiac Pacemaker- Medtronic, dual chamber- NOT dependant 11/28/2007    CKD (chronic kidney disease), stage IV (H)     Hyperlipidemia LDL goal <70     Hypertension     Stented coronary artery 10-    RCA    Stented coronary artery 2-5-2003    LCx    Stented coronary artery 4-    LAD    Stented coronary artery 11-    LAD    Stented coronary artery 12-    RCA    Transient complete heart block (H) 11/28/2007     Past Surgical History:   Procedure Laterality Date    CHOLECYSTECTOMY      CV CORONARY ANGIOGRAM N/A 6/17/2022    Procedure: Coronary Angiogram;  Surgeon: Constantine Macias MD;  Location: [facility] CARDIAC CATH LAB    CV CORONARY ANGIOGRAM N/A 7/17/2023    Procedure: Coronary Angiogram;  Surgeon: Constantine Macias MD;  Location: [facility] CARDIAC CATH LAB    CV LEFT ATRIAL APPENDAGE CLOSURE N/A 8/8/2024    Procedure: Left Atrial Appendage Closure;  Surgeon: Rodrick Garcia MD;  Location: [facility] CARDIAC CATH LAB    CV PCI N/A 6/17/2022    Procedure: Percutaneous Coronary Intervention;  Surgeon: Constantine Macias MD;  Location: [facility] CARDIAC CATH LAB    CV PCI N/A 7/17/2023    Procedure: Percutaneous Coronary Intervention;  Surgeon: Constantine Macias MD;  Location: [facility] CARDIAC CATH LAB    CV PCI N/A 11/6/2023    Procedure: Percutaneous Coronary Intervention;  Surgeon: Constantine Macias MD;  Location: [facility] CARDIAC CATH LAB    CV PERICARDIOCENTESIS N/A 8/8/2024     Procedure: Pericardiocentesis;  Surgeon: Rodrick Garcia MD;  Location:  HEART CARDIAC CATH LAB    CV PERICARDIOCENTESIS N/A 8/11/2024    Procedure: Pericardial Drain Adjustment, Possible New Pericardial Drain Placement;  Surgeon: Rodrick Garcia MD;  Location:  HEART CARDIAC CATH LAB    CV RIGHT HEART CATH MEASUREMENTS RECORDED N/A 6/17/2022    Procedure: Right Heart Catheterization;  Surgeon: Constantine Macias MD;  Location:  HEART CARDIAC CATH LAB    EP PACEMAKER N/A 10/15/2019    Procedure: EP PACEMAKER;  Surgeon: Anthony Zhu MD;  Location:  HEART CARDIAC CATH LAB    ESOPHAGOSCOPY, GASTROSCOPY, DUODENOSCOPY (EGD), COMBINED N/A 7/20/2023    Procedure: Esophagoscopy, gastroscopy, duodenoscopy (EGD), combined;  Surgeon: Bekah Dash DO;  [facility]    ESOPHAGOSCOPY, GASTROSCOPY, DUODENOSCOPY (EGD), COMBINED N/A 5/2/2024    Procedure: Esophagoscopy, gastroscopy, duodenoscopy (EGD), combined;  Surgeon: Tavo Donaldson MD;  Location: [facility]    HC PPM INSERTION NEW/REPLACEMENT W/ ATRIAL&VENTRICULAR LEAD  11-    HYSTERECTOMY      LAPAROTOMY EXPLORATORY N/A 4/13/2024    Procedure: Laparotomy exploratory, Wound Vac Placement.;  Surgeon: Anthony Trammell MD;  [facility]    LAPAROTOMY EXPLORATORY N/A 4/14/2024    Procedure: Abdominal Re-Exploration and Closure;  Surgeon: Anthony Trammell MD;  [facility]    LAPAROTOMY EXPLORATORY N/A 4/13/2024    Procedure: Exploratory Laparotomy;  Surgeon: Anthony Trammell MD;  [facility]: [facility]     acetaminophen (TYLENOL) 325 MG tablet  allopurinol (ZYLOPRIM) 100 MG tablet  amLODIPine (NORVASC) 5 MG tablet  BUDESONIDE IN  clopidogrel (PLAVIX) 75 MG tablet  fluticasone-salmeterol (ADVAIR) 250-50 MCG/ACT inhaler  furosemide (LASIX) 40 MG tablet  isosorbide mononitrate (IMDUR) 30 MG 24 hr tablet  losartan (COZAAR) 25 MG tablet  methocarbamol (ROBAXIN) 500 MG tablet  metoprolol tartrate (LOPRESSOR) 25 MG  "tablet  Multiple Vitamins-Minerals (PRESERVISION AREDS 2+MULTI VIT PO)  omeprazole (PRILOSEC) 40 MG DR capsule  potassium chloride ER (K-TAB) 20 MEQ CR tablet  Psyllium (METAMUCIL PO)  rosuvastatin (CRESTOR) 20 MG tablet  sodium bicarbonate 650 MG tablet  timolol maleate (TIMOPTIC) 0.5 % ophthalmic solution  vitamin B-12 (CYANOCOBALAMIN) 500 MCG tablet  vitamin D3 25 mcg (1000 units) tablet      Allergies   Allergen Reactions    Shrimp Swelling    Codeine Sulfate Itching    Indomethacin      Other Reaction(s): Not available    Levofloxacin Hemihydrate      Other Reaction(s): Not available    Morphine Sulfate      Other Reaction(s): Not available    Oxycodone Other (See Comments)    Shrimp Extract Swelling    Sulfamethoxazole W/Trimethoprim      Other Reaction(s): Not available    Chlorthalidone Nausea and Vomiting     Other Reaction(s): Not available    Clarithromycin      Other Reaction(s): Not available    Clonidine      Other Reaction(s): Not available    Darvon [Propoxyphene Hcl]     Gabapentin Confusion and Fatigue     \"felt drunk\"    Other Reaction(s): Not available    Hydromorphone Hallucination and Visual Disturbance     Tolerated in April 2024 hospital admissions    Other Reaction(s): Not available    Indomethacin     Percocet [Oxycodone-Acetaminophen] Hallucination    Pregabalin Fatigue and Itching    Simvastatin Palpitations and Cramps     Muscle weakness, leg cramping    Spironolactone      Dehydrated    Other Reaction(s): Not available    Terazosin      Other Reaction(s): Not available     Family History  Family History   Problem Relation Age of Onset    Cancer Sister 82        bladder cancer     Social History   Social History     Tobacco Use    Smoking status: Former     Current packs/day: 0.30     Average packs/day: 0.3 packs/day for 15.0 years (4.5 ttl pk-yrs)     Types: Cigarettes    Smokeless tobacco: Never    Tobacco comments:     Quit 35+ years ago   Vaping Use    Vaping status: Never Used " "  Substance Use Topics    Drug use: No      Past medical history, past surgical history, medications, allergies, family history, and social history were reviewed with the patient. No additional pertinent items.   A medically appropriate review of systems was performed with pertinent positives and negatives noted in the HPI, and all other systems negative.    Physical Exam   BP: (!) 171/82  Pulse: 68  Temp: 97.8  F (36.6  C)  Resp: 16  Height: 167.6 cm (5' 6\")  Weight: 63.5 kg (140 lb)  SpO2: 100 %  Physical Exam  General: Afebrile, in distress secondary to pain  HEENT: Normocephalic, left periorbital and facial ecchymosis, sutures in the left lateral eyebrow, MMM  Neck: non-tender, supple  Cardio: regular rate. regular rhythm   Resp: Normal work of breathing, no respiratory distress, lungs clear bilaterally, no wheezing, rhonchi, rales  Chest/Back: no visual signs of trauma, no midline tenderness to palpation, no step-offs, no deformities, no CVA tenderness   Abdomen: soft, non distension, no tenderness, no peritoneal signs   Neuro: alert and fully oriented. CN II-XII grossly intact. Grossly normal strength and sensation in all extremities.   MSK: no deformities. Normal range of motion  Integumentary/Skin: no rash visualized, normal color  Psych: normal affect, normal behavior      ED Course, Procedures, & Data      Procedures       Results for orders placed or performed during the hospital encounter of 03/29/25   [place] Draw     Status: None (In process)    Narrative    The following orders were created for panel order [place] Draw.  Procedure                               Abnormality         Status                     ---------                               -----------         ------                     Extra Blue Top Tube[[id number]]                             In process                 Extra Red Top Tube[[id number]]                              In process                 Extra Green Top (Lithiu...[[id number]]  "                     In process                 Extra Purple Top Tube[[id number]]                           In process                   Please view results for these tests on the individual orders.     Medications - No data to display  Labs Ordered and Resulted from Time of ED Arrival to Time of ED Departure - No data to display  CT Lumbar Spine w/o Contrast    (Results Pending)          Critical care was not performed.     Medical Decision Making  The patient's presentation was of high complexity (an acute health issue posing potential threat to life or bodily function).    The patient's evaluation involved:  review of external note(s) from 1 sources (recent ED note)  review of 2 test result(s) ordered prior to this encounter (see separate area of note for details)  ordering and/or review of 1 test(s) in this encounter (CT)  independent interpretation of testing performed by another health professional (CT)  discussion of management or test interpretation with another health professional (morning provider)    The patient's management necessitated high risk (a decision regarding hospitalization).    Assessment & Plan    Tessa Mansfield is a 89 year old female who has a past history of HTN, CKD4, CAD s/p multiple stents, and Afib s/p Watchman on 8/4/24 anticoagulated on Plavix who presents to the ED for low back pain.  Upon arrival patient is nontoxic-appearing, afebrile, in distress.  Patient here with low back pain x 1 day describes as a soreness/achiness that is worse with movement.  Patient does have a recent fall a few days prior and is on chronic anticoagulation with Plavix.  I reviewed patient's recent ED visit on 3/25/2025 including imaging studies.  Patient received Tylenol en route by EMS and at this time does not want anything stronger for pain.  Given patient's recent trauma, history of being on chronic anticoagulation will proceed with CT imaging.  Patient with no focal neurological deficits, no red  flags.    Patient signed out to morning provider pending CT imaging, reevaluation, final disposition.    I have reviewed the nursing notes. I have reviewed the findings, diagnosis, plan and need for follow up with the patient.    New Prescriptions    No medications on file       Final diagnoses:   Acute bilateral low back pain without sciatica       Anita Pendleton MD  [facility] EMERGENCY DEPARTMENT  3/29/2025     Anita Pendleton MD  03/29/25 0716

## 2025-03-29 NOTE — ED NOTES
"     Emergency Department Patient Sign-out       Brief HPI:  This is a 89 year old female signed out to me by Dr. SALAZAR Pendleton .  See initial ED Provider note for details of the presentation.            Significant Events prior to my assuming care: CT-no acute pathologies      Exam:   Patient Vitals for the past 24 hrs:   BP Temp Temp src Pulse Resp SpO2 Height Weight   03/29/25 1126 -- -- -- 65 -- 98 % -- --   03/29/25 1056 -- -- -- 63 -- 98 % -- --   03/29/25 1026 -- -- -- 60 -- 99 % -- --   03/29/25 0956 -- -- -- 72 -- 100 % -- --   03/29/25 0926 (!) 160/70 -- -- 60 -- 97 % -- --   03/29/25 0900 (!) 160/79 -- -- 66 -- 100 % -- --   03/29/25 0700 (!) 154/65 -- -- 60 -- 99 % -- --   03/29/25 0609 (!) 159/63 -- -- 67 -- 99 % 1.676 m (5' 6\") 63.5 kg (140 lb)   03/29/25 0604 (!) 171/82 97.8  F (36.6  C) Oral 68 16 100 % -- --           ED RESULTS:   Results for orders placed or performed during the hospital encounter of 03/29/25 (from the past 24 hours)   [place] Draw     Status: None (In process)    Collection Time: 03/29/25  6:09 AM    Narrative    The following orders were created for panel order [place] Draw.  Procedure                               Abnormality         Status                     ---------                               -----------         ------                     Extra Blue Top Tube[[id number]]                             In process                 Extra Red Top Tube[[id number]]                              In process                 Extra Green Top (Lithiu...[[id number]]                      In process                 Extra Purple Top Tube[[id number]]                           In process                   Please view results for these tests on the individual orders.   CT Lumbar Spine w/o Contrast     Status: None    Collection Time: 03/29/25  8:36 AM    Narrative    EXAM: CT LUMBAR SPINE W/O CONTRAST  3/29/2025 8:36 AM     HISTORY: low back pain, recent fall on 3/25, on anticoagulation   "     COMPARISON: 7/3/2024 CT abdomen/pelvis    TECHNIQUE: Using multidetector thin collimation helical acquisition  technique, axial images were obtained through the lumbar spine without  intravenous contrast. Coronal and sagittal reconstructions were  created, reviewed and archived. Images were viewed in bone and soft  tissue windows. Dose reduction techniques were used.    FINDINGS:  There are 5 lumbar type vertebrae, used for the purposes of this  dictation. No acute fracture or traumatic subluxation. No suspicious  osseous lesion. Moderate multilevel facet joint hypertrophy. Mild  multilevel disc space narrowing with degenerative disc disease most  pronounced at L2-L3 with calcification of the annulus fibrosis and  accompanying vacuum disc phenomenon. There also appears to be subtle  calcifications of the intervertebral discs of the disks from L3-L5.  Trace anterolisthesis of L4 on L5. Trace right lateral listhesis of L2  on L3 and trace left lateral listhesis of L4 on L5.    On a level by level basis, the findings are as follows:    L1-L2: Broad-based symmetric disc bulge with mild ligamentum flavum  hypertrophy. Mild to moderate facet joint hypertrophy. No significant  spinal canal or neural foraminal stenosis.    L2-L3: Broadbase disc bulge with posterior disc osteophyte complexes  and mild ligamentum flavum hypertrophy. Mild spinal canal stenosis.  Bilateral mild neural foraminal stenosis.    L3-L4: Broadbase disc bulge with posterior disc osteophyte complexes  and mild to moderate ligamentum flavum hypertrophy. Mild spinal canal  stenosis. Moderate bilateral neural foraminal stenosis.     L4-L5: Broad-based disc bulge with posterior disc osteophyte  complexes. Given to flail hypertrophy.. Moderate spinal canal  stenosis. Severe narrowing of the right neural foramen. Moderate  narrowing of the left neural foramen.     L5-S1: Broad-based disc bulge with superimposed asymmetric rightward  disc protrusion and  disc osteophyte complexes. No intracranial  hypertrophy. No significant spinal canal stenosis. Mild left neural  foraminal stenosis. Severe right foraminal stenosis.     Moderately atrophic appearance of the kidneys (left greater than  right) with calcifications of the mid-segments of the renal arteries  bilaterally. Moderate atherosclerotic clusters of the lower abdominal  aorta and proximal iliac arterial vasculature. Moderate  atherosclerotic calcifications at the ostia of the renal arteries with  associated atelectatic dilatation. Mild to moderate atheromatous  calcifications of the origin of the proximal inferior mesenteric  artery which is dilated measuring up to 1.0 cm in diameter. Infrarenal  IVC filter. Colonic diverticulosis without definite diverticulitis  where visualized      Impression    IMPRESSION:  1. No acute fracture or traumatic subluxation.  2. Multilevel degenerative changes as detailed above. Most  significantly there is severe neural foraminal stenosis at right L4-5,  L5-S1 and moderate spinal canal stenosis at L4-5.    I have personally reviewed the examination and initial interpretation  and I agree with the findings.    LYNSEY MARRERO MD         SYSTEM ID:  [id number]       ED MEDICATIONS:   Medications - No data to display      Impression:    ICD-10-CM    1. Acute bilateral low back pain without sciatica  M54.50           Plan:    Pending studies include still able to ambulate with a cane but with pains, willing to try tramadol and lido patch then follow up with her PMD or one of the Primary Clinics in one week.        Alyssa Mejia MD, Alyssa Whitten MD  03/29/25 [id number]

## 2025-04-01 ENCOUNTER — APPOINTMENT (OUTPATIENT)
Dept: GENERAL RADIOLOGY | Facility: CLINIC | Age: 89
End: 2025-04-01
Attending: INTERNAL MEDICINE
Payer: COMMERCIAL

## 2025-04-01 ENCOUNTER — APPOINTMENT (OUTPATIENT)
Dept: CT IMAGING | Facility: CLINIC | Age: 89
End: 2025-04-01
Attending: INTERNAL MEDICINE
Payer: COMMERCIAL

## 2025-04-01 ENCOUNTER — HOSPITAL ENCOUNTER (INPATIENT)
Facility: CLINIC | Age: 89
End: 2025-04-01
Attending: INTERNAL MEDICINE | Admitting: INTERNAL MEDICINE
Payer: COMMERCIAL

## 2025-04-01 DIAGNOSIS — I25.118 CORONARY ARTERY DISEASE OF NATIVE ARTERY OF NATIVE HEART WITH STABLE ANGINA PECTORIS: ICD-10-CM

## 2025-04-01 DIAGNOSIS — M11.261 PSEUDOGOUT OF RIGHT KNEE: Primary | ICD-10-CM

## 2025-04-01 DIAGNOSIS — M25.461 EFFUSION OF RIGHT KNEE: ICD-10-CM

## 2025-04-01 DIAGNOSIS — R26.81 UNSTEADY GAIT: ICD-10-CM

## 2025-04-01 LAB
ABO + RH BLD: NORMAL
ALBUMIN SERPL BCG-MCNC: 4 G/DL (ref 3.5–5.2)
ALP SERPL-CCNC: 117 U/L (ref 40–150)
ALT SERPL W P-5'-P-CCNC: 16 U/L (ref 0–50)
ANION GAP SERPL CALCULATED.3IONS-SCNC: 17 MMOL/L (ref 7–15)
AST SERPL W P-5'-P-CCNC: 33 U/L (ref 0–45)
BASE EXCESS BLDV CALC-SCNC: -2 MMOL/L (ref -3–3)
BILIRUB SERPL-MCNC: 1.3 MG/DL
BLD GP AB SCN SERPL QL: NEGATIVE
BUN SERPL-MCNC: 29.3 MG/DL (ref 8–23)
CALCIUM SERPL-MCNC: 9.6 MG/DL (ref 8.8–10.4)
CHLORIDE SERPL-SCNC: 101 MMOL/L (ref 98–107)
CREAT SERPL-MCNC: 1.85 MG/DL (ref 0.51–0.95)
CRP SERPL-MCNC: 235 MG/L
EGFRCR SERPLBLD CKD-EPI 2021: 26 ML/MIN/1.73M2
ERYTHROCYTE [DISTWIDTH] IN BLOOD BY AUTOMATED COUNT: 14.3 % (ref 10–15)
ERYTHROCYTE [SEDIMENTATION RATE] IN BLOOD BY WESTERGREN METHOD: 49 MM/HR (ref 0–30)
GLUCOSE SERPL-MCNC: 115 MG/DL (ref 70–99)
HCO3 BLDV-SCNC: 22 MMOL/L (ref 21–28)
HCO3 SERPL-SCNC: 21 MMOL/L (ref 22–29)
HCT VFR BLD AUTO: 36.6 % (ref 35–47)
HGB BLD-MCNC: 11.7 G/DL (ref 11.7–15.7)
INR PPP: 1.27 (ref 0.85–1.15)
LACTATE BLD-SCNC: 1.4 MMOL/L (ref 0.7–2)
LACTATE SERPL-SCNC: 1.8 MMOL/L (ref 0.7–2)
MCH RBC QN AUTO: 30.2 PG (ref 26.5–33)
MCHC RBC AUTO-ENTMCNC: 32 G/DL (ref 31.5–36.5)
MCV RBC AUTO: 94 FL (ref 78–100)
PCO2 BLDV: 37 MM HG (ref 40–50)
PH BLDV: 7.39 [PH] (ref 7.32–7.43)
PLAT MORPH BLD: ABNORMAL
PLATELET # BLD AUTO: 95 10E3/UL (ref 150–450)
PO2 BLDV: 29 MM HG (ref 25–47)
POLYCHROMASIA BLD QL SMEAR: SLIGHT
POTASSIUM SERPL-SCNC: 3.7 MMOL/L (ref 3.4–5.3)
PROCALCITONIN SERPL IA-MCNC: 0.86 NG/ML
PROT SERPL-MCNC: 7.6 G/DL (ref 6.4–8.3)
RBC # BLD AUTO: 3.88 10E6/UL (ref 3.8–5.2)
RBC MORPH BLD: ABNORMAL
SAO2 % BLDV: 56 % (ref 70–75)
SODIUM SERPL-SCNC: 139 MMOL/L (ref 135–145)
SPECIMEN EXP DATE BLD: NORMAL
TROPONIN T SERPL HS-MCNC: 43 NG/L
TROPONIN T SERPL HS-MCNC: 55 NG/L
WBC # BLD AUTO: 18.4 10E3/UL (ref 4–11)

## 2025-04-01 PROCEDURE — 96375 TX/PRO/DX INJ NEW DRUG ADDON: CPT | Performed by: INTERNAL MEDICINE

## 2025-04-01 PROCEDURE — 76882 US LMTD JT/FCL EVL NVASC XTR: CPT | Mod: RT | Performed by: INTERNAL MEDICINE

## 2025-04-01 PROCEDURE — 86140 C-REACTIVE PROTEIN: CPT | Performed by: INTERNAL MEDICINE

## 2025-04-01 PROCEDURE — 80053 COMPREHEN METABOLIC PANEL: CPT | Performed by: INTERNAL MEDICINE

## 2025-04-01 PROCEDURE — 82945 GLUCOSE OTHER FLUID: CPT | Performed by: INTERNAL MEDICINE

## 2025-04-01 PROCEDURE — 84157 ASSAY OF PROTEIN OTHER: CPT | Performed by: INTERNAL MEDICINE

## 2025-04-01 PROCEDURE — 85610 PROTHROMBIN TIME: CPT | Performed by: INTERNAL MEDICINE

## 2025-04-01 PROCEDURE — 87205 SMEAR GRAM STAIN: CPT | Performed by: INTERNAL MEDICINE

## 2025-04-01 PROCEDURE — 73700 CT LOWER EXTREMITY W/O DYE: CPT | Mod: RT

## 2025-04-01 PROCEDURE — 85041 AUTOMATED RBC COUNT: CPT | Performed by: INTERNAL MEDICINE

## 2025-04-01 PROCEDURE — 99285 EMERGENCY DEPT VISIT HI MDM: CPT | Mod: 25 | Performed by: INTERNAL MEDICINE

## 2025-04-01 PROCEDURE — 96376 TX/PRO/DX INJ SAME DRUG ADON: CPT | Performed by: INTERNAL MEDICINE

## 2025-04-01 PROCEDURE — 250N000011 HC RX IP 250 OP 636: Performed by: INTERNAL MEDICINE

## 2025-04-01 PROCEDURE — 85007 BL SMEAR W/DIFF WBC COUNT: CPT | Performed by: INTERNAL MEDICINE

## 2025-04-01 PROCEDURE — 76882 US LMTD JT/FCL EVL NVASC XTR: CPT | Mod: 26 | Performed by: INTERNAL MEDICINE

## 2025-04-01 PROCEDURE — 250N000013 HC RX MED GY IP 250 OP 250 PS 637: Performed by: INTERNAL MEDICINE

## 2025-04-01 PROCEDURE — 84145 PROCALCITONIN (PCT): CPT | Performed by: INTERNAL MEDICINE

## 2025-04-01 PROCEDURE — 93005 ELECTROCARDIOGRAM TRACING: CPT | Performed by: INTERNAL MEDICINE

## 2025-04-01 PROCEDURE — 83605 ASSAY OF LACTIC ACID: CPT | Performed by: INTERNAL MEDICINE

## 2025-04-01 PROCEDURE — 36415 COLL VENOUS BLD VENIPUNCTURE: CPT | Performed by: INTERNAL MEDICINE

## 2025-04-01 PROCEDURE — 73562 X-RAY EXAM OF KNEE 3: CPT | Mod: RT

## 2025-04-01 PROCEDURE — 20611 DRAIN/INJ JOINT/BURSA W/US: CPT | Mod: RT | Performed by: INTERNAL MEDICINE

## 2025-04-01 PROCEDURE — 999N000285 HC STATISTIC VASC ACCESS LAB DRAW WITH PIV START

## 2025-04-01 PROCEDURE — 87040 BLOOD CULTURE FOR BACTERIA: CPT | Performed by: INTERNAL MEDICINE

## 2025-04-01 PROCEDURE — 0S9C3ZX DRAINAGE OF RIGHT KNEE JOINT, PERCUTANEOUS APPROACH, DIAGNOSTIC: ICD-10-PCS | Performed by: INTERNAL MEDICINE

## 2025-04-01 PROCEDURE — 84484 ASSAY OF TROPONIN QUANT: CPT | Performed by: INTERNAL MEDICINE

## 2025-04-01 PROCEDURE — 86900 BLOOD TYPING SEROLOGIC ABO: CPT | Performed by: INTERNAL MEDICINE

## 2025-04-01 PROCEDURE — 89060 EXAM SYNOVIAL FLUID CRYSTALS: CPT | Performed by: INTERNAL MEDICINE

## 2025-04-01 PROCEDURE — 73562 X-RAY EXAM OF KNEE 3: CPT | Mod: 26 | Performed by: RADIOLOGY

## 2025-04-01 PROCEDURE — 89050 BODY FLUID CELL COUNT: CPT | Performed by: INTERNAL MEDICINE

## 2025-04-01 PROCEDURE — 73700 CT LOWER EXTREMITY W/O DYE: CPT | Mod: 26 | Performed by: RADIOLOGY

## 2025-04-01 PROCEDURE — 85014 HEMATOCRIT: CPT | Performed by: INTERNAL MEDICINE

## 2025-04-01 PROCEDURE — 82803 BLOOD GASES ANY COMBINATION: CPT

## 2025-04-01 PROCEDURE — 96374 THER/PROPH/DIAG INJ IV PUSH: CPT | Performed by: INTERNAL MEDICINE

## 2025-04-01 PROCEDURE — 87070 CULTURE OTHR SPECIMN AEROBIC: CPT | Performed by: INTERNAL MEDICINE

## 2025-04-01 PROCEDURE — 999N000248 HC STATISTIC IV INSERT WITH US BY RN

## 2025-04-01 PROCEDURE — 85652 RBC SED RATE AUTOMATED: CPT | Performed by: INTERNAL MEDICINE

## 2025-04-01 RX ORDER — METOPROLOL TARTRATE 50 MG
50 TABLET ORAL 2 TIMES DAILY
Status: DISPENSED | OUTPATIENT
Start: 2025-04-01

## 2025-04-01 RX ORDER — SODIUM BICARBONATE 650 MG/1
650 TABLET ORAL 2 TIMES DAILY
Status: DISPENSED | OUTPATIENT
Start: 2025-04-01

## 2025-04-01 RX ORDER — LOSARTAN POTASSIUM 25 MG/1
25 TABLET ORAL 2 TIMES DAILY
Status: DISPENSED | OUTPATIENT
Start: 2025-04-01

## 2025-04-01 RX ORDER — AMLODIPINE BESYLATE 5 MG/1
5 TABLET ORAL DAILY
Status: DISPENSED | OUTPATIENT
Start: 2025-04-02

## 2025-04-01 RX ORDER — DILTIAZEM HYDROCHLORIDE 5 MG/ML
10 INJECTION INTRAVENOUS ONCE
Status: COMPLETED | OUTPATIENT
Start: 2025-04-01 | End: 2025-04-01

## 2025-04-01 RX ORDER — FENTANYL CITRATE 50 UG/ML
25 INJECTION, SOLUTION INTRAMUSCULAR; INTRAVENOUS ONCE
Status: COMPLETED | OUTPATIENT
Start: 2025-04-01 | End: 2025-04-01

## 2025-04-01 RX ORDER — LIDOCAINE HYDROCHLORIDE AND EPINEPHRINE 10; 10 MG/ML; UG/ML
10 INJECTION, SOLUTION INFILTRATION; PERINEURAL ONCE
Status: COMPLETED | OUTPATIENT
Start: 2025-04-01 | End: 2025-04-01

## 2025-04-01 RX ORDER — FENTANYL CITRATE 50 UG/ML
25 INJECTION, SOLUTION INTRAMUSCULAR; INTRAVENOUS ONCE
Status: DISCONTINUED | OUTPATIENT
Start: 2025-04-01 | End: 2025-04-01 | Stop reason: ALTCHOICE

## 2025-04-01 RX ORDER — FENTANYL CITRATE 50 UG/ML
50 INJECTION, SOLUTION INTRAMUSCULAR; INTRAVENOUS ONCE
Status: COMPLETED | OUTPATIENT
Start: 2025-04-01 | End: 2025-04-01

## 2025-04-01 RX ORDER — CLOPIDOGREL BISULFATE 75 MG/1
75 TABLET ORAL DAILY
Status: DISPENSED | OUTPATIENT
Start: 2025-04-02

## 2025-04-01 RX ADMIN — FENTANYL CITRATE 25 MCG: 50 INJECTION, SOLUTION INTRAMUSCULAR; INTRAVENOUS at 18:01

## 2025-04-01 RX ADMIN — LIDOCAINE HYDROCHLORIDE AND EPINEPHRINE 10 ML: 10; 10 INJECTION, SOLUTION INFILTRATION; PERINEURAL at 22:15

## 2025-04-01 RX ADMIN — FENTANYL CITRATE 25 MCG: 50 INJECTION, SOLUTION INTRAMUSCULAR; INTRAVENOUS at 21:18

## 2025-04-01 RX ADMIN — DILTIAZEM HYDROCHLORIDE 10 MG: 5 INJECTION, SOLUTION INTRAVENOUS at 19:43

## 2025-04-01 RX ADMIN — LOSARTAN POTASSIUM 25 MG: 25 TABLET, FILM COATED ORAL at 23:38

## 2025-04-01 RX ADMIN — SODIUM BICARBONATE 650 MG TABLET 650 MG: at 23:38

## 2025-04-01 RX ADMIN — METOPROLOL TARTRATE 50 MG: 50 TABLET, FILM COATED ORAL at 23:38

## 2025-04-01 RX ADMIN — FENTANYL CITRATE 50 MCG: 50 INJECTION, SOLUTION INTRAMUSCULAR; INTRAVENOUS at 22:16

## 2025-04-01 ASSESSMENT — ACTIVITIES OF DAILY LIVING (ADL)
ADLS_ACUITY_SCORE: 57

## 2025-04-01 NOTE — ED NOTES
[facility]  Expected date:   Expected time:   Means of arrival:   Comments:  [id number]

## 2025-04-01 NOTE — ED TRIAGE NOTES
BIBA from home for pain from fall last Tuesday. Went to Urgent care with negative workup. Pain has progressed since fall last week. Bruising all throughout body. EMS  systolic, tachycardic. Pain 15/10. Pt took tramadol around 1000 today. Nothing given en route. Takes Plavix. A&Ox4, able to make her needs known.

## 2025-04-01 NOTE — ED PROVIDER NOTES
ED Provider Note  [facility] [place]      History     Chief Complaint   Patient presents with    Fall     Fall from last tuesday    Knee Pain     HPI  Tessa Mansfield is a 89 year old female with a notable history of hypertension, CKD4, CAD s/p multiple stents, atrial fibrillation s/p Watchman (8/4/24) anticoagulated on Plavix, recurrent GIB who presents to the ED for worsening bruising and pain following a fall last Tuesday. Patient was seen in the emergency department that day on 3/25/2025 as she fell on her face.  Patient had CT imaging of her head and cervical spine which were unremarkable, and had lacerations repaired. She returned here to the ED 3/29/2025 with worsening back pain.    Today, patient endorses worsening back, hip, foot, and knee pain. Her right knee is very swollen. She states that she can no longer get up by herself. Patient also notes that she cannot put any pressure on her knee. Her pain is constant.       Past Medical History  Past Medical History:   Diagnosis Date    CAD (coronary artery disease)     CAD s/p PCI+BMS to MRCA 10/2002, PCI to mLCX 2/2003, PCI+DESx2 to pLAD 11/2007, PCI+DESx1 to dRCA 12/2007, PCI+ALVAREZ to RPDA 7/2013; on indefinite DAPT  10/27/2002    10/27/2002: AMI s/p PCI+BMS (3.5x18mm Bx velocity) to mRCA 2/5/2003: Back pain; PCI+stent to mLCX c/b distal embolization/slow flow 4/11/2003: Unstable angina; PCI+stent (Hepacoat velocity) to mLAD 11/27/2007: PCI+DESx2 to pLAD (IVUS w/ calcification of LMCA and ulcerated plaque pLAD, 80% dRCA; also had 80% dLCX, too small to stent) 12/11/2007: Staged PCI. PCI+DESx1 (3.5x13mm Cypher) to dRCA (indefinite DAPT recommended at this time) 6/27/2008: Angina. mLCX stent 70% ISR. LAD and RCA stents patent. Myocardium at risk from LCX felt to be small, medical management preferred to PCI. 11/10/2009: Angina. Findings unchanged from 6/27/2008, FFR LAD 0.90. Medical management recommended. 7/23/2013: Unstable angina; PCI+ALVAREZ  to mPDA. Diagnostic findings: LMCA 40% distal. LAD: pLAD stents patent mLAD 30% ISR dLAD 50% diffuse, D2 diffuse disease. LCX 80% mid ISR. RCA diffuse <30% mid, RPLAs diffuse disease, RPDA 100% occlusion.      Cardiac Pacemaker- Medtronic, dual chamber- NOT dependant 11/28/2007    CKD (chronic kidney disease), stage IV (H)     Hyperlipidemia LDL goal <70     Hypertension     Stented coronary artery 10-    RCA    Stented coronary artery 2-5-2003    LCx    Stented coronary artery 4-    LAD    Stented coronary artery 11-    LAD    Stented coronary artery 12-    RCA    Transient complete heart block (H) 11/28/2007     Past Surgical History:   Procedure Laterality Date    CHOLECYSTECTOMY      CV CORONARY ANGIOGRAM N/A 6/17/2022    Procedure: Coronary Angiogram;  Surgeon: Constantine Macias MD;  Location: [facility] CARDIAC CATH LAB    CV CORONARY ANGIOGRAM N/A 7/17/2023    Procedure: Coronary Angiogram;  Surgeon: Constantine Macias MD;  Location: [facility] CARDIAC CATH LAB    CV LEFT ATRIAL APPENDAGE CLOSURE N/A 8/8/2024    Procedure: Left Atrial Appendage Closure;  Surgeon: Rodrick Garcia MD;  Location: [facility] CARDIAC CATH LAB    CV PCI N/A 6/17/2022    Procedure: Percutaneous Coronary Intervention;  Surgeon: Constantine Macias MD;  Location: [facility] CARDIAC CATH LAB    CV PCI N/A 7/17/2023    Procedure: Percutaneous Coronary Intervention;  Surgeon: Constantine Macias MD;  Location: [facility] CARDIAC CATH LAB    CV PCI N/A 11/6/2023    Procedure: Percutaneous Coronary Intervention;  Surgeon: Constantine Macias MD;  Location: [facility] CARDIAC CATH LAB    CV PERICARDIOCENTESIS N/A 8/8/2024    Procedure: Pericardiocentesis;  Surgeon: Rodrick Garcia MD;  Location: [facility] CARDIAC CATH LAB    CV PERICARDIOCENTESIS N/A 8/11/2024    Procedure: Pericardial Drain Adjustment, Possible New Pericardial Drain Placement;  Surgeon: Rodrick Garcia MD;  Location: [facility]  "CARDIAC CATH LAB    CV RIGHT HEART CATH MEASUREMENTS RECORDED N/A 6/17/2022    Procedure: Right Heart Catheterization;  Surgeon: Constantine Macias MD;  Location:  HEART CARDIAC CATH LAB    EP PACEMAKER N/A 10/15/2019    Procedure: EP PACEMAKER;  Surgeon: Anthony Zhu MD;  Location:  HEART CARDIAC CATH LAB    ESOPHAGOSCOPY, GASTROSCOPY, DUODENOSCOPY (EGD), COMBINED N/A 7/20/2023    Procedure: Esophagoscopy, gastroscopy, duodenoscopy (EGD), combined;  Surgeon: Bekah Dash DO;  Location:  GI    ESOPHAGOSCOPY, GASTROSCOPY, DUODENOSCOPY (EGD), COMBINED N/A 5/2/2024    Procedure: Esophagoscopy, gastroscopy, duodenoscopy (EGD), combined;  Surgeon: Tavo Donaldson MD;  Location:  GI    HC PPM INSERTION NEW/REPLACEMENT W/ ATRIAL&VENTRICULAR LEAD  11-    HYSTERECTOMY      LAPAROTOMY EXPLORATORY N/A 4/13/2024    Procedure: Laparotomy exploratory, Wound Vac Placement.;  Surgeon: Anthony Trammell MD;  Location: [facility] OR    LAPAROTOMY EXPLORATORY N/A 4/14/2024    Procedure: Abdominal Re-Exploration and Closure;  Surgeon: Anthony Trammell MD;  Location: [facility] OR    LAPAROTOMY EXPLORATORY N/A 4/13/2024    Procedure: Exploratory Laparotomy;  Surgeon: Anthony Trammell MD;  Location: [facility] OR     No current outpatient medications on file.    Allergies   Allergen Reactions    Shrimp Swelling    Codeine Sulfate Itching    Indomethacin      Other Reaction(s): Not available    Levofloxacin Hemihydrate      Other Reaction(s): Not available    Morphine Sulfate      Other Reaction(s): Not available    Oxycodone Other (See Comments)    Shrimp Extract Swelling    Sulfamethoxazole W/Trimethoprim      Other Reaction(s): Not available    Chlorthalidone Nausea and Vomiting     Other Reaction(s): Not available    Clarithromycin      Other Reaction(s): Not available    Clonidine      Other Reaction(s): Not available    Darvon [Propoxyphene Hcl]     Gabapentin Confusion and Fatigue     \"felt " "drunk\"    Other Reaction(s): Not available    Hydromorphone Hallucination and Visual Disturbance     Tolerated in April 2024 hospital admissions    Other Reaction(s): Not available    Indomethacin     Percocet [Oxycodone-Acetaminophen] Hallucination    Pregabalin Fatigue and Itching    Simvastatin Palpitations and Cramps     Muscle weakness, leg cramping    Spironolactone      Dehydrated    Other Reaction(s): Not available    Terazosin      Other Reaction(s): Not available     Family History  Family History   Problem Relation Age of Onset    Cancer Sister 82        bladder cancer     Social History   Social History     Tobacco Use    Smoking status: Former     Current packs/day: 0.30     Average packs/day: 0.3 packs/day for 15.0 years (4.5 ttl pk-yrs)     Types: Cigarettes    Smokeless tobacco: Never    Tobacco comments:     Quit 35+ years ago   Vaping Use    Vaping status: Never Used   Substance Use Topics    Drug use: No      A medically appropriate review of systems was performed with pertinent positives and negatives noted in the HPI, and all other systems negative.    Physical Exam   BP: (!) 195/101  Pulse: (!) 127  Temp: 98  F (36.7  C)  Resp: 18  Height: 167.6 cm (5' 6\")  SpO2: 97 %  Physical Exam  Vitals and nursing note reviewed.   Constitutional:       General: She is not in acute distress.     Appearance: She is not diaphoretic.   HENT:      Head: Normocephalic and atraumatic.   Eyes:      Extraocular Movements: Extraocular movements intact.      Conjunctiva/sclera: Conjunctivae normal.   Cardiovascular:      Rate and Rhythm: Regular rhythm. Tachycardia present.      Heart sounds: Normal heart sounds. No murmur heard.     No friction rub. No gallop.   Pulmonary:      Effort: Pulmonary effort is normal. No respiratory distress.      Breath sounds: Normal breath sounds. No stridor. No wheezing, rhonchi or rales.   Chest:      Chest wall: No tenderness.   Abdominal:      General: Abdomen is flat. Bowel " sounds are normal. There is no distension.      Palpations: Abdomen is soft. There is no mass.      Tenderness: There is no abdominal tenderness. There is no right CVA tenderness, left CVA tenderness, guarding or rebound.      Hernia: No hernia is present.   Musculoskeletal:      Cervical back: Normal range of motion and neck supple.      Right knee: Swelling and effusion present. No deformity, erythema, ecchymosis, lacerations, bony tenderness or crepitus. Decreased range of motion. Tenderness present. No LCL laxity, MCL laxity, ACL laxity or PCL laxity. Normal alignment, normal meniscus and normal patellar mobility. Normal pulse.      Instability Tests: Anterior drawer test negative. Posterior drawer test negative. Anterior Lachman test negative. Medial Mateo test negative and lateral Mateo test negative.      Left knee: Normal.        Legs:    Skin:     General: Skin is warm.      Findings: No rash.   Neurological:      General: No focal deficit present.      Cranial Nerves: No cranial nerve deficit.      Sensory: No sensory deficit.      Motor: Weakness present.      Coordination: Coordination normal.      Gait: Gait normal.      Deep Tendon Reflexes: Reflexes normal.           ED Course, Procedures, & Data      Arthrocentesis    Date/Time: 4/2/2025 12:04 AM    Performed by: Alyssa Mejia MD  Authorized by: Alyssa Mejia MD    Risks, benefits and alternatives discussed.      LOCATION:   Location:  Knee  Knee:  R knee  ANESTHESIA (see MAR for exact dosages):   Anesthesia method:  Local infiltration  Local anesthetic:  Lidocaine 1% WITH epi    PROCEDURE DETAILS:     Needle gauge:  20 G    Ultrasound guidance: yes      Approach:  Medial    Aspirate amount:  30    Aspirate characteristics:  Cloudy and yellow    Steroid injected: no      Specimen collected: yes      Dressing: adhesive bandage      PROCEDURE    Patient Tolerance:  Patient tolerated the procedure well with no immediate  complications    Results for orders placed during the hospital encounter of 04/01/25    POC US SOFT TISSUE    Impression  Limited Soft Tissue Ultrasound, performed and interpreted by me.    Indication:  pain swelling. Evaluate for cellulitis vs abscess.    Body location: right lower extremity    Findings:  There is no cobblestoning suggestive of cellulitis in the evaluated area. There is a fluid collection measuring moderate to large to suggest effusion. No foreign body identified    IMPRESSION: moderate to large knee effusion       IV Antibiotics given and/or elevated Lactate of 0 and no sepsis note found - Delete this reminder and enter the sepsis note or '.edcms' before signing chart.>>>     Results for orders placed or performed during the hospital encounter of 04/01/25   XR Knee Right 3 Views     Status: None    Narrative    EXAM: XR KNEE RIGHT 3 VIEWS  LOCATION: [facility]  DATE: 4/1/2025    INDICATION: Fall, pain.  COMPARISON: 06/25/2018.      Impression    IMPRESSION: Bones are markedly demineralized and there is a large knee joint effusion. Tricompartmental degenerative changes which are severe in the patellofemoral compartment. There is chondrocalcinosis. No evidence of an acute displaced fracture.   CT Knee Right w/o Contrast     Status: None    Narrative    EXAM: CT KNEE RIGHT W/O CONTRAST  LOCATION: [facility]  DATE: 4/1/2025    INDICATION: fall pain  COMPARISON: None.  TECHNIQUE: Noncontrast. Axial, sagittal and coronal thin-section reconstruction. Dose reduction techniques were used.     FINDINGS:     No acute fracture.    Demineralization. Tricompartmental degenerative arthritis with mild medial and lateral compartment joint space narrowing and chondrocalcinosis. Small marginal osteophytes. Large knee joint effusion. No lipohemarthrosis. Large Baker's cyst. Ill-defined   fluid within the popliteal fossa and  tracking along the superficial myofascial plane of the medial head of the gastrocnemius which could reflect prior rupture. Mild fatty atrophy of the musculature about the knee.    Arterial calcifications.        Impression    IMPRESSION:  1.  No acute fracture.  2.  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  3.  Large Baker's cyst with ill-defined fluid tracking along the superficial myofascial plane of the medial head of the gastrocnemius which could reflect prior rupture.       POC US SOFT TISSUE     Status: None    Impression    Limited Soft Tissue Ultrasound, performed and interpreted by me.    Indication:  pain swelling. Evaluate for cellulitis vs abscess.     Body location: right lower extremity    Findings:  There is no cobblestoning suggestive of cellulitis in the evaluated area. There is a fluid collection measuring moderate to large to suggest effusion. No foreign body identified    IMPRESSION: moderate to large knee effusion          XR Pelvis w Hip Right 1 View     Status: None    Narrative    EXAM: XR PELVIS AND HIP RIGHT 1 VIEW  LOCATION: [facility]  DATE: 4/2/2025    INDICATION: Right hip pain and bruising after a fall  COMPARISON: None.      Impression    IMPRESSION: Bilateral hip arthroplasties. No hardware complication. No acute fracture.   INR     Status: Abnormal   Result Value Ref Range    INR 1.27 (H) 0.85 - 1.15   Comprehensive metabolic panel     Status: Abnormal   Result Value Ref Range    Sodium 139 135 - 145 mmol/L    Potassium 3.7 3.4 - 5.3 mmol/L    Carbon Dioxide (CO2) 21 (L) 22 - 29 mmol/L    Anion Gap 17 (H) 7 - 15 mmol/L    Urea Nitrogen 29.3 (H) 8.0 - 23.0 mg/dL    Creatinine 1.85 (H) 0.51 - 0.95 mg/dL    GFR Estimate 26 (L) >60 mL/min/1.73m2    Calcium 9.6 8.8 - 10.4 mg/dL    Chloride 101 98 - 107 mmol/L    Glucose 115 (H) 70 - 99 mg/dL    Alkaline Phosphatase 117 40 - 150 U/L    AST 33 0 - 45 U/L    ALT 16  0 - 50 U/L    Protein Total 7.6 6.4 - 8.3 g/dL    Albumin 4.0 3.5 - 5.2 g/dL    Bilirubin Total 1.3 (H) <=1.2 mg/dL   UA with Microscopic reflex to Culture     Status: Abnormal    Specimen: Urine, Clean Catch   Result Value Ref Range    Color Urine Yellow Colorless, Straw, Light Yellow, Yellow    Appearance Urine Slightly Cloudy (A) Clear    Glucose Urine Negative Negative mg/dL    Bilirubin Urine Negative Negative    Ketones Urine Negative Negative mg/dL    Specific Gravity Urine 1.018 1.003 - 1.035    Blood Urine Small (A) Negative    pH Urine 6.0 5.0 - 7.0    Protein Albumin Urine 300 (A) Negative mg/dL    Urobilinogen Urine 2.0 (A) Normal mg/dL    Nitrite Urine Negative Negative    Leukocyte Esterase Urine Negative Negative    Mucus Urine Present (A) None Seen /LPF    RBC Urine 5 (H) <=2 /HPF    WBC Urine 7 (H) <=5 /HPF    Squamous Epithelials Urine 5 (H) <=1 /HPF    Transitional Epithelials Urine 1 <=1 /HPF    Narrative    Urine Culture not indicated   Troponin T, High Sensitivity     Status: Abnormal   Result Value Ref Range    Troponin T, High Sensitivity 43 (H) <=14 ng/L   CBC with platelets and differential     Status: Abnormal   Result Value Ref Range    WBC Count 18.4 (H) 4.0 - 11.0 10e3/uL    RBC Count 3.88 3.80 - 5.20 10e6/uL    Hemoglobin 11.7 11.7 - 15.7 g/dL    Hematocrit 36.6 35.0 - 47.0 %    MCV 94 78 - 100 fL    MCH 30.2 26.5 - 33.0 pg    MCHC 32.0 31.5 - 36.5 g/dL    RDW 14.3 10.0 - 15.0 %    Platelet Count 95 (L) 150 - 450 10e3/uL   iStat Gases (lactate) venous, POCT     Status: Abnormal   Result Value Ref Range    Lactic Acid POCT 1.4 0.7 - 2.0 mmol/L    Bicarbonate Venous POCT 22 21 - 28 mmol/L    O2 Sat, Venous POCT 56 (L) 70 - 75 %    pCO2 Venous POCT 37 (L) 40 - 50 mm Hg    pH Venous POCT 7.39 7.32 - 7.43    pO2 Venous POCT 29 25 - 47 mm Hg    Base Excess/Deficit (+/-) POCT -2.0 -3.0 - 3.0 mmol/L   RBC and Platelet Morphology     Status: Abnormal   Result Value Ref Range    RBC Morphology  Confirmed RBC Indices     Platelet Assessment (A) Automated Count Confirmed. Platelet morphology is normal.     Automated Count Confirmed. Giant platelets are present.    Polychromasia Slight (A) None Seen   Manual Differential     Status: Abnormal   Result Value Ref Range    % Neutrophils 61 %    % Lymphocytes 12 %    % Monocytes 27 %    % Eosinophils 0 %    % Basophils 0 %    Absolute Neutrophils 11.3 (H) 1.6 - 8.3 10e3/uL    Absolute Lymphocytes 2.1 0.8 - 5.3 10e3/uL    Absolute Monocytes 5.0 (H) 0.0 - 1.3 10e3/uL    Absolute Eosinophils 0.0 0.0 - 0.7 10e3/uL    Absolute Basophils 0.0 0.0 - 0.2 10e3/uL    Pathologist Review Comments (Blood)       An absolute monocytosis is present. Rare atypical monocytes are seen. Per EPIC, the clinical team is currently concerned for a septic joint, which favors the monocytosis to be reactive.     Some possible etiologies for monocytosis include infection, inflammatory and immune disorders, neutropenic status, postsplenectomy, and hematologic malignancies. Please consider repeating a CBC with differential after resolution of this patient's acute event. If monocytosis is persistent, please consider sending an official blood morphology review and send a peripheral blood sample for flow cytometry evaluation. Please correlate clinically.     James Cartagena MD on 4/2/2025 at 3:09 PM    Narrative    Sent for review by Pathologist.  See Pathologist comments after review.     Troponin T, High Sensitivity     Status: Abnormal   Result Value Ref Range    Troponin T, High Sensitivity 55 (H) <=14 ng/L   Lactic Acid Whole Blood w/ 1x repeat in 2 hrs when >2     Status: Normal   Result Value Ref Range    Lactic Acid, Initial 1.8 0.7 - 2.0 mmol/L   Procalcitonin     Status: Abnormal   Result Value Ref Range    Procalcitonin 0.86 (H) <0.50 ng/mL   CRP inflammation     Status: Abnormal   Result Value Ref Range    CRP Inflammation 235.00 (H) <5.00 mg/L   Erythrocyte sedimentation rate  auto     Status: Abnormal   Result Value Ref Range    Erythrocyte Sedimentation Rate 49 (H) 0 - 30 mm/hr   Crystal ID Synovial Fluid     Status: Abnormal   Result Value Ref Range    Crystals Analysis (A) No clinically significant crystals seen.     Positive for intracellular crystals, consistent with calcium pyrophosphate dihydrate crystals.   Glucose fluid     Status: None   Result Value Ref Range    Glucose Fluid Source Knee, Right     Glucose fluid 73 mg/dL    Narrative    No reference ranges have been established. This result should be interpreted in the context of the patient's clinical condition and compared to simultaneous measurement in the patient's blood. This is a lab developed test. It has not been cleared or approved by the FDA. FDA clearance is not required for clinical use.   Protein fluid     Status: None   Result Value Ref Range    Protein Fluid Source Knee, Right     Protein Total Fluid 4.2 g/dL    Narrative    No reference ranges have been established. This result should be interpreted in the context of the patient's clinical condition and compared to simultaneous measurement in the patient's blood. This is a lab developed test. It has not been cleared or approved by the FDA. FDA clearance is not required for clinical use.   Cell Count Body Fluid     Status: Abnormal   Result Value Ref Range    Color Yellow Colorless, Yellow    Clarity Turbid (A) Clear    Cell Count Fluid Source Knee, Right     Total Nucleated Cells 23,970 /uL    Narrative    No reference ranges have been established.  This result  should be interpreted in the context of the patient's clinical condition and   compared to simultaneous measurement in the patient's blood.         Differential Body Fluid     Status: None   Result Value Ref Range    % Neutrophils 93 %    % Lymphocytes 3 %    % Monocyte/Macrophages 4 %    Narrative    No reference ranges have been established. This result should be interpreted in the context of the  patient's clinical condition and compared to simultaneous measurement in the patient's blood.   Troponin T, High Sensitivity     Status: Abnormal   Result Value Ref Range    Troponin T, High Sensitivity 92 (H) <=14 ng/L   Basic metabolic panel     Status: Abnormal   Result Value Ref Range    Sodium 140 135 - 145 mmol/L    Potassium 4.0 3.4 - 5.3 mmol/L    Chloride 101 98 - 107 mmol/L    Carbon Dioxide (CO2) 24 22 - 29 mmol/L    Anion Gap 15 7 - 15 mmol/L    Urea Nitrogen 29.2 (H) 8.0 - 23.0 mg/dL    Creatinine 2.01 (H) 0.51 - 0.95 mg/dL    GFR Estimate 23 (L) >60 mL/min/1.73m2    Calcium 9.3 8.8 - 10.4 mg/dL    Glucose 111 (H) 70 - 99 mg/dL   CBC with platelets and differential     Status: Abnormal   Result Value Ref Range    WBC Count 18.3 (H) 4.0 - 11.0 10e3/uL    RBC Count 3.87 3.80 - 5.20 10e6/uL    Hemoglobin 11.8 11.7 - 15.7 g/dL    Hematocrit 36.5 35.0 - 47.0 %    MCV 94 78 - 100 fL    MCH 30.5 26.5 - 33.0 pg    MCHC 32.3 31.5 - 36.5 g/dL    RDW 14.2 10.0 - 15.0 %    Platelet Count 85 (L) 150 - 450 10e3/uL   RBC and Platelet Morphology     Status: None   Result Value Ref Range    RBC Morphology Confirmed RBC Indices     Platelet Assessment  Automated Count Confirmed. Platelet morphology is normal.     Automated Count Confirmed. Platelet morphology is normal.   Manual Differential     Status: Abnormal   Result Value Ref Range    % Neutrophils 46 %    % Lymphocytes 14 %    % Monocytes 40 %    % Eosinophils 0 %    % Basophils 1 %    Absolute Neutrophils 8.3 1.6 - 8.3 10e3/uL    Absolute Lymphocytes 2.5 0.8 - 5.3 10e3/uL    Absolute Monocytes 7.2 (H) 0.0 - 1.3 10e3/uL    Absolute Eosinophils 0.0 0.0 - 0.7 10e3/uL    Absolute Basophils 0.2 0.0 - 0.2 10e3/uL   Troponin T, High Sensitivity     Status: Abnormal   Result Value Ref Range    Troponin T, High Sensitivity 91 (H) <=14 ng/L   CBC with platelets     Status: Abnormal   Result Value Ref Range    WBC Count 8.0 4.0 - 11.0 10e3/uL    RBC Count 3.72 (L) 3.80 -  5.20 10e6/uL    Hemoglobin 11.1 (L) 11.7 - 15.7 g/dL    Hematocrit 34.7 (L) 35.0 - 47.0 %    MCV 93 78 - 100 fL    MCH 29.8 26.5 - 33.0 pg    MCHC 32.0 31.5 - 36.5 g/dL    RDW 13.9 10.0 - 15.0 %    Platelet Count 88 (L) 150 - 450 10e3/uL   Basic metabolic panel     Status: Abnormal   Result Value Ref Range    Sodium 136 135 - 145 mmol/L    Potassium 4.3 3.4 - 5.3 mmol/L    Chloride 100 98 - 107 mmol/L    Carbon Dioxide (CO2) 19 (L) 22 - 29 mmol/L    Anion Gap 17 (H) 7 - 15 mmol/L    Urea Nitrogen 50.5 (H) 8.0 - 23.0 mg/dL    Creatinine 2.68 (H) 0.51 - 0.95 mg/dL    GFR Estimate 17 (L) >60 mL/min/1.73m2    Calcium 9.1 8.8 - 10.4 mg/dL    Glucose 151 (H) 70 - 99 mg/dL   CRP inflammation     Status: Abnormal   Result Value Ref Range    CRP Inflammation 272.29 (H) <5.00 mg/L   EKG 12-lead, tracing only     Status: None (Preliminary result)   Result Value Ref Range    Systolic Blood Pressure  mmHg    Diastolic Blood Pressure  mmHg    Ventricular Rate 117 BPM    Atrial Rate  BPM    DC Interval  ms    QRS Duration 76 ms     ms    QTc 429 ms    P Axis  degrees    R AXIS 28 degrees    T Axis 200 degrees    Interpretation ECG       Atrial fibrillation with rapid ventricular response  Septal infarct , age undetermined  ST & T wave abnormality, consider lateral ischemia  Abnormal ECG    Unconfirmed report - interpretation of this ECG is computer generated - see medical record for final interpretation     Adult Type and Screen     Status: None   Result Value Ref Range    ABO/RH(D) O NEG     Antibody Screen Negative Negative    SPECIMEN EXPIRATION DATE [id number]    Blood Culture Arm, Right     Status: Normal (Preliminary result)    Specimen: Arm, Right; Blood   Result Value Ref Range    Culture No growth after 1 day    Blood Culture Arm, Right     Status: Normal (Preliminary result)    Specimen: Arm, Right; Blood   Result Value Ref Range    Culture No growth after 1 day    Synovial fluid Aerobic Bacterial Culture  Routine With Gram Stain     Status: None (Preliminary result)    Specimen: Knee, Right; Synovial fluid   Result Value Ref Range    Culture No growth after 1 day     Gram Stain Result No organisms seen     Gram Stain Result 4+ WBC seen    MRSA MSSA PCR, Nasal Swab     Status: None    Specimen: Nose; Swab   Result Value Ref Range    MRSA Target DNA Negative Negative    SA Target DNA Negative     Narrative    The [employer]  Xpert SA Nasal Complete assay performed in the [employer]  Dx System is a qualitative in vitro diagnostic test designed for rapid detection of Staphylococcus aureus (SA) and methicillin-resistant Staphylococcus aureus (MRSA) from nasal swabs in patients at risk for nasal colonization. The test utilizes automated real-time polymerase chain reaction (PCR) to detect MRSA/SA DNA. The Xpert SA Nasal Complete assay is intended to aid in the prevention and control of MRSA/SA infections in healthcare settings. The assay is not intended to diagnose, guide or monitor treatment for MRSA/SA infections, or provide results of susceptibility to methicillin. A negative result does not preclude MRSA/SA nasal colonization.    CBC with platelets differential     Status: Abnormal    Narrative    The following orders were created for panel order CBC with platelets differential.  Procedure                               Abnormality         Status                     ---------                               -----------         ------                     CBC with platelets and ...[[id number]]  Abnormal            Final result               RBC and Platelet Morpho...[[id number]]  Abnormal            Final result               Manual Differential[[id number]]         Abnormal            Final result                 Please view results for these tests on the individual orders.   ABO/Rh type and screen     Status: None    Narrative    The following orders were created for panel order ABO/Rh type and screen.  Procedure                                Abnormality         Status                     ---------                               -----------         ------                     Adult Type and Screen[[id number]]                           Final result                 Please view results for these tests on the individual orders.   Cell count with differential fluid     Status: Abnormal    Narrative    The following orders were created for panel order Cell count with differential fluid.  Procedure                               Abnormality         Status                     ---------                               -----------         ------                     Cell Count Body Fluid[[id number]]       Abnormal            Final result               Differential Body Fluid[[id number]]                         Final result                 Please view results for these tests on the individual orders.   CBC with platelets differential     Status: Abnormal    Narrative    The following orders were created for panel order CBC with platelets differential.  Procedure                               Abnormality         Status                     ---------                               -----------         ------                     CBC with platelets and ...[[id number]]  Abnormal            Final result               RBC and Platelet Morpho...[[id number]]                      Final result               Manual Differential[[id number]]         Abnormal            Final result                 Please view results for these tests on the individual orders.     Medications   amLODIPine (NORVASC) tablet 5 mg (5 mg Oral $Given 4/3/25 0826)   clopidogrel (PLAVIX) tablet 75 mg (75 mg Oral $Given 4/3/25 1001)   isosorbide mononitrate (IMDUR) 24 hr tablet 90 mg (90 mg Oral $Given 4/3/25 1000)   losartan (COZAAR) tablet 25 mg (25 mg Oral $Given 4/3/25 0826)   metoprolol tartrate (LOPRESSOR) tablet 50 mg (50 mg Oral $Given 4/3/25 0826)   sodium bicarbonate tablet 650 mg (650 mg  Oral $Given 4/3/25 0958)   acetaminophen (TYLENOL) tablet 975 mg (975 mg Oral $Given 4/3/25 1310)   allopurinol (ZYLOPRIM) half-tab 100 mg (100 mg Oral $Given 4/3/25 0958)   furosemide (LASIX) tablet 40 mg (40 mg Oral $Given 4/3/25 1000)   Lidocaine (LIDOCARE) 4 % Patch 3 patch (2 patches Transdermal Not Given 4/2/25 1803)   methocarbamol (ROBAXIN) tablet 500 mg (500 mg Oral $Given 4/3/25 0826)   multivitamin  with lutein (OCUVITE WITH LUTEIN) per capsule 1 capsule (1 capsule Oral $Given 4/3/25 0959)   omeprazole (PriLOSEC) CR capsule 40 mg (40 mg Oral $Given 4/3/25 1000)   potassium chloride ER (MICRO-K) CR capsule 20 mEq (20 mEq Oral $Given 4/3/25 0958)   rosuvastatin (CRESTOR) tablet 10 mg (10 mg Oral $Given 4/3/25 1001)   timolol maleate (TIMOPTIC) 0.5 % ophthalmic solution 1 drop (1 drop Both Eyes $Given 4/3/25 1001)   cyanocobalamin (VITAMIN B-12) tablet 500 mcg (500 mcg Oral $Given 4/3/25 1001)   Vitamin D3 (CHOLECALCIFEROL) tablet 25 mcg (25 mcg Oral $Given 4/2/25 1015)   lidocaine 1 % 0.1-1 mL (has no administration in time range)   lidocaine (LMX4) cream (has no administration in time range)   sodium chloride (PF) 0.9% PF flush 3 mL (3 mLs Intracatheter $Given 4/3/25 0529)   sodium chloride (PF) 0.9% PF flush 3 mL (3 mLs Intracatheter $Given 4/3/25 0034)   senna-docusate (SENOKOT-S/PERICOLACE) 8.6-50 MG per tablet 1 tablet (has no administration in time range)     Or   senna-docusate (SENOKOT-S/PERICOLACE) 8.6-50 MG per tablet 2 tablet (has no administration in time range)   calcium carbonate (TUMS) chewable tablet 1,000 mg (has no administration in time range)   polyethylene glycol (MIRALAX) Packet 17 g (has no administration in time range)   ondansetron (ZOFRAN ODT) ODT tab 4 mg (has no administration in time range)     Or   ondansetron (ZOFRAN) injection 4 mg (has no administration in time range)   fentaNYL (PF) (SUBLIMAZE) injection 25 mcg (has no administration in time range)   cefTRIAXone (ROCEPHIN)  2 g vial to attach to  ml bag for ADULTS or NS 50 ml bag for PEDS ( Intravenous Automatically Held 4/8/25 0500)   diclofenac (VOLTAREN) 1 % topical gel 2 g (has no administration in time range)   vancomycin place wang - receiving intermittent dosing ( Intravenous Held by provider 4/2/25 0802)   HYDROmorphone (DILAUDID) tablet 2 mg (2 mg Oral $Given 4/3/25 0826)   naloxone (NARCAN) injection 0.2 mg (has no administration in time range)     Or   naloxone (NARCAN) injection 0.4 mg (has no administration in time range)     Or   naloxone (NARCAN) injection 0.2 mg (has no administration in time range)     Or   naloxone (NARCAN) injection 0.4 mg (has no administration in time range)   predniSONE (DELTASONE) tablet 40 mg (40 mg Oral $Given 4/3/25 1004)   fentaNYL (PF) (SUBLIMAZE) injection 25 mcg (25 mcg Intravenous $Given 4/1/25 1801)   diltiazem (CARDIZEM) injection 10 mg (10 mg Intravenous $Given 4/1/25 1943)   fentaNYL (PF) (SUBLIMAZE) injection 25 mcg (25 mcg Intravenous $Given 4/1/25 2118)   lidocaine 1% with EPINEPHrine 1:100,000 injection 10 mL (10 mLs Intradermal $Given by Other 4/1/25 2215)   fentaNYL (PF) (SUBLIMAZE) injection 50 mcg (50 mcg Intravenous $Given 4/1/25 2216)   fentaNYL (PF) (SUBLIMAZE) injection 50 mcg (50 mcg Intravenous $Given 4/2/25 0044)   fentaNYL (PF) (SUBLIMAZE) injection 50 mcg (50 mcg Intravenous $Given 4/2/25 0427)   vancomycin (VANCOCIN) 1,250 mg in 0.9% NaCl 250 mL intermittent infusion (1,250 mg Intravenous $New Bag 4/2/25 0611)   methylPREDNISolone Na Suc (solu-MEDROL) injection 125 mg (125 mg Intravenous $Given 4/3/25 0035)   methylPREDNISolone Na Suc (solu-MEDROL) 125 mg/2 mL injection (  $Given 4/3/25 0045)     Labs Ordered and Resulted from Time of ED Arrival to Time of ED Departure   INR - Abnormal       Result Value    INR 1.27 (*)    COMPREHENSIVE METABOLIC PANEL - Abnormal    Sodium 139      Potassium 3.7      Carbon Dioxide (CO2) 21 (*)     Anion Gap 17 (*)     Urea  Nitrogen 29.3 (*)     Creatinine 1.85 (*)     GFR Estimate 26 (*)     Calcium 9.6      Chloride 101      Glucose 115 (*)     Alkaline Phosphatase 117      AST 33      ALT 16      Protein Total 7.6      Albumin 4.0      Bilirubin Total 1.3 (*)    TROPONIN T, HIGH SENSITIVITY - Abnormal    Troponin T, High Sensitivity 43 (*)    CBC WITH PLATELETS AND DIFFERENTIAL - Abnormal    WBC Count 18.4 (*)     RBC Count 3.88      Hemoglobin 11.7      Hematocrit 36.6      MCV 94      MCH 30.2      MCHC 32.0      RDW 14.3      Platelet Count 95 (*)    ISTAT GASES LACTATE VENOUS POCT - Abnormal    Lactic Acid POCT 1.4      Bicarbonate Venous POCT 22      O2 Sat, Venous POCT 56 (*)     pCO2 Venous POCT 37 (*)     pH Venous POCT 7.39      pO2 Venous POCT 29      Base Excess/Deficit (+/-) POCT -2.0     RBC AND PLATELET MORPHOLOGY - Abnormal    RBC Morphology Confirmed RBC Indices      Platelet Assessment   (*)     Value: Automated Count Confirmed. Giant platelets are present.    Polychromasia Slight (*)    TROPONIN T, HIGH SENSITIVITY - Abnormal    Troponin T, High Sensitivity 55 (*)    PROCALCITONIN - Abnormal    Procalcitonin 0.86 (*)    CRP INFLAMMATION - Abnormal    CRP Inflammation 235.00 (*)    ERYTHROCYTE SEDIMENTATION RATE AUTO - Abnormal    Erythrocyte Sedimentation Rate 49 (*)    CRYSTAL ID SYNOVIAL FLUID - Abnormal    Crystals Analysis   (*)     Value: Positive for intracellular crystals, consistent with calcium pyrophosphate dihydrate crystals.   CELL COUNT BODY FLUID - Abnormal    Color Yellow      Clarity Turbid (*)     Cell Count Fluid Source Knee, Right      Total Nucleated Cells 23,970     LACTIC ACID WHOLE BLOOD WITH 1X REPEAT IN 2 HR WHEN >2 - Normal    Lactic Acid, Initial 1.8     GLUCOSE FLUID    Glucose Fluid Source Knee, Right      Glucose fluid 73     PROTEIN FLUID    Protein Fluid Source Knee, Right      Protein Total Fluid 4.2     DIFERENTIAL BODY FLUID    % Neutrophils 93      % Lymphocytes 3      %  Monocyte/Macrophages 4     TYPE AND SCREEN, ADULT    ABO/RH(D) O NEG      Antibody Screen Negative      SPECIMEN EXPIRATION DATE [id number]     ABO/RH TYPE AND SCREEN   CELL COUNT WITH DIFFERENTIAL FLUID     XR Pelvis w Hip Right 1 View   Final Result   IMPRESSION: Bilateral hip arthroplasties. No hardware complication. No acute fracture.      POC US SOFT TISSUE   Final Result   Limited Soft Tissue Ultrasound, performed and interpreted by me.      Indication:  pain swelling. Evaluate for cellulitis vs abscess.       Body location: right lower extremity      Findings:  There is no cobblestoning suggestive of cellulitis in the evaluated area. There is a fluid collection measuring moderate to large to suggest effusion. No foreign body identified      IMPRESSION: moderate to large knee effusion                CT Knee Right w/o Contrast   Final Result   IMPRESSION:   1.  No acute fracture.   2.  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   3.  Large Baker's cyst with ill-defined fluid tracking along the superficial myofascial plane of the medial head of the gastrocnemius which could reflect prior rupture.            XR Knee Right 3 Views   Final Result   IMPRESSION: Bones are markedly demineralized and there is a large knee joint effusion. Tricompartmental degenerative changes which are severe in the patellofemoral compartment. There is chondrocalcinosis. No evidence of an acute displaced fracture.             Critical care was not performed.     Medical Decision Making  The patient's presentation was of moderate complexity (an acute illness with systemic symptoms).    The patient's evaluation involved:  ordering and/or review of 3+ test(s) in this encounter (see separate area of note for details)    The patient's management necessitated high risk (a decision regarding hospitalization).    Assessment & Plan    Acute right knee pain, swelling to the point not able to ambulate, lives with  her  who is disabled, about one week ago took a fall with LBP but no knee pain present at that time, on just plavix for anticoagulation, XR CT without fx dislocation or soft tissue injuries or heme or lipohemarthrosis but large effusion-tapped cloudy, awaiting synovial fluid analysis. Labs significant for mild to moderate elevation of inflammatory markers, kept NPO in case of septic joint.    Signed off to incoming EP, likely admit to Med or Ortho.    I have reviewed the nursing notes. I have reviewed the findings, diagnosis, plan and need for follow up with the patient.    Current Discharge Medication List          Final diagnoses:   Effusion of right knee   Unsteady gait   IErrol, am serving as a trained medical scribe to document services personally performed by Alyssa Mejia MD, based on the provider's statements to me.     IAlyssa MD, was physically present and have reviewed and verified the accuracy of this note documented by Errol Dozier.     Alyssa Mejia MD  [facility] EMERGENCY DEPARTMENT  4/1/2025     Alyssa Mejia MD  04/03/25 2135

## 2025-04-02 ENCOUNTER — APPOINTMENT (OUTPATIENT)
Dept: PHYSICAL THERAPY | Facility: CLINIC | Age: 89
End: 2025-04-02
Attending: STUDENT IN AN ORGANIZED HEALTH CARE EDUCATION/TRAINING PROGRAM
Payer: COMMERCIAL

## 2025-04-02 ENCOUNTER — APPOINTMENT (OUTPATIENT)
Dept: GENERAL RADIOLOGY | Facility: CLINIC | Age: 89
DRG: 554 | End: 2025-04-02
Attending: STUDENT IN AN ORGANIZED HEALTH CARE EDUCATION/TRAINING PROGRAM
Payer: COMMERCIAL

## 2025-04-02 PROBLEM — R26.81 UNSTEADY GAIT: Status: ACTIVE | Noted: 2025-04-02

## 2025-04-02 PROBLEM — M25.461 EFFUSION OF RIGHT KNEE: Status: ACTIVE | Noted: 2025-04-02

## 2025-04-02 LAB
ALBUMIN UR-MCNC: 300 MG/DL
ANION GAP SERPL CALCULATED.3IONS-SCNC: 15 MMOL/L (ref 7–15)
APPEARANCE FLD: ABNORMAL
APPEARANCE UR: ABNORMAL
ATRIAL RATE - MUSE: NORMAL BPM
BASOPHILS # BLD MANUAL: 0 10E3/UL (ref 0–0.2)
BASOPHILS # BLD MANUAL: 0.2 10E3/UL (ref 0–0.2)
BASOPHILS NFR BLD MANUAL: 0 %
BASOPHILS NFR BLD MANUAL: 1 %
BILIRUB UR QL STRIP: NEGATIVE
BUN SERPL-MCNC: 29.2 MG/DL (ref 8–23)
CALCIUM SERPL-MCNC: 9.3 MG/DL (ref 8.8–10.4)
CHLORIDE SERPL-SCNC: 101 MMOL/L (ref 98–107)
COLOR FLD: YELLOW
COLOR UR AUTO: YELLOW
CREAT SERPL-MCNC: 2.01 MG/DL (ref 0.51–0.95)
CRYSTALS SNV MICRO: ABNORMAL
DIASTOLIC BLOOD PRESSURE - MUSE: NORMAL MMHG
EGFRCR SERPLBLD CKD-EPI 2021: 23 ML/MIN/1.73M2
EOSINOPHIL # BLD MANUAL: 0 10E3/UL (ref 0–0.7)
EOSINOPHIL # BLD MANUAL: 0 10E3/UL (ref 0–0.7)
EOSINOPHIL NFR BLD MANUAL: 0 %
EOSINOPHIL NFR BLD MANUAL: 0 %
ERYTHROCYTE [DISTWIDTH] IN BLOOD BY AUTOMATED COUNT: 14.2 % (ref 10–15)
GLUCOSE BODY FLUID SOURCE: NORMAL
GLUCOSE FLD-MCNC: 73 MG/DL
GLUCOSE SERPL-MCNC: 111 MG/DL (ref 70–99)
GLUCOSE UR STRIP-MCNC: NEGATIVE MG/DL
HCO3 SERPL-SCNC: 24 MMOL/L (ref 22–29)
HCT VFR BLD AUTO: 36.5 % (ref 35–47)
HGB BLD-MCNC: 11.8 G/DL (ref 11.7–15.7)
HGB UR QL STRIP: ABNORMAL
INTERPRETATION ECG - MUSE: NORMAL
KETONES UR STRIP-MCNC: NEGATIVE MG/DL
LEUKOCYTE ESTERASE UR QL STRIP: NEGATIVE
LYMPHOCYTES # BLD MANUAL: 2.1 10E3/UL (ref 0.8–5.3)
LYMPHOCYTES # BLD MANUAL: 2.5 10E3/UL (ref 0.8–5.3)
LYMPHOCYTES NFR BLD MANUAL: 12 %
LYMPHOCYTES NFR BLD MANUAL: 14 %
LYMPHOCYTES NFR FLD MANUAL: 3 %
MCH RBC QN AUTO: 30.5 PG (ref 26.5–33)
MCHC RBC AUTO-ENTMCNC: 32.3 G/DL (ref 31.5–36.5)
MCV RBC AUTO: 94 FL (ref 78–100)
MONOCYTES # BLD MANUAL: 5 10E3/UL (ref 0–1.3)
MONOCYTES # BLD MANUAL: 7.2 10E3/UL (ref 0–1.3)
MONOCYTES NFR BLD MANUAL: 27 %
MONOCYTES NFR BLD MANUAL: 40 %
MONOS+MACROS NFR FLD MANUAL: 4 %
MRSA DNA SPEC QL NAA+PROBE: NEGATIVE
MUCOUS THREADS #/AREA URNS LPF: PRESENT /LPF
NEUTROPHILS # BLD MANUAL: 11.3 10E3/UL (ref 1.6–8.3)
NEUTROPHILS # BLD MANUAL: 8.3 10E3/UL (ref 1.6–8.3)
NEUTROPHILS NFR BLD MANUAL: 46 %
NEUTROPHILS NFR BLD MANUAL: 61 %
NEUTS BAND NFR FLD MANUAL: 93 %
NITRATE UR QL: NEGATIVE
P AXIS - MUSE: NORMAL DEGREES
PATH REV: ABNORMAL
PH UR STRIP: 6 [PH] (ref 5–7)
PLAT MORPH BLD: NORMAL
PLATELET # BLD AUTO: 85 10E3/UL (ref 150–450)
POTASSIUM SERPL-SCNC: 4 MMOL/L (ref 3.4–5.3)
PR INTERVAL - MUSE: NORMAL MS
PROT FLD-MCNC: 4.2 G/DL
PROTEIN BODY FLUID SOURCE: NORMAL
QRS DURATION - MUSE: 76 MS
QT - MUSE: 308 MS
QTC - MUSE: 429 MS
R AXIS - MUSE: 28 DEGREES
RBC # BLD AUTO: 3.87 10E6/UL (ref 3.8–5.2)
RBC MORPH BLD: NORMAL
RBC URINE: 5 /HPF
SA TARGET DNA: NEGATIVE
SODIUM SERPL-SCNC: 140 MMOL/L (ref 135–145)
SP GR UR STRIP: 1.02 (ref 1–1.03)
SPECIMEN SOURCE FLD: ABNORMAL
SQUAMOUS EPITHELIAL: 5 /HPF
SYSTOLIC BLOOD PRESSURE - MUSE: NORMAL MMHG
T AXIS - MUSE: 200 DEGREES
TRANSITIONAL EPI: 1 /HPF
TROPONIN T SERPL HS-MCNC: 91 NG/L
TROPONIN T SERPL HS-MCNC: 92 NG/L
UROBILINOGEN UR STRIP-MCNC: 2 MG/DL
VENTRICULAR RATE- MUSE: 117 BPM
WBC # BLD AUTO: 18.3 10E3/UL (ref 4–11)
WBC # FLD AUTO: ABNORMAL /UL
WBC URINE: 7 /HPF

## 2025-04-02 PROCEDURE — 250N000013 HC RX MED GY IP 250 OP 250 PS 637: Performed by: INTERNAL MEDICINE

## 2025-04-02 PROCEDURE — 36415 COLL VENOUS BLD VENIPUNCTURE: CPT | Performed by: STUDENT IN AN ORGANIZED HEALTH CARE EDUCATION/TRAINING PROGRAM

## 2025-04-02 PROCEDURE — 120N000002 HC R&B MED SURG/OB UMMC

## 2025-04-02 PROCEDURE — G0378 HOSPITAL OBSERVATION PER HR: HCPCS

## 2025-04-02 PROCEDURE — 250N000011 HC RX IP 250 OP 636: Mod: JZ | Performed by: INTERNAL MEDICINE

## 2025-04-02 PROCEDURE — 85041 AUTOMATED RBC COUNT: CPT | Performed by: STUDENT IN AN ORGANIZED HEALTH CARE EDUCATION/TRAINING PROGRAM

## 2025-04-02 PROCEDURE — 96376 TX/PRO/DX INJ SAME DRUG ADON: CPT | Performed by: INTERNAL MEDICINE

## 2025-04-02 PROCEDURE — 85048 AUTOMATED LEUKOCYTE COUNT: CPT | Performed by: STUDENT IN AN ORGANIZED HEALTH CARE EDUCATION/TRAINING PROGRAM

## 2025-04-02 PROCEDURE — 85007 BL SMEAR W/DIFF WBC COUNT: CPT | Performed by: STUDENT IN AN ORGANIZED HEALTH CARE EDUCATION/TRAINING PROGRAM

## 2025-04-02 PROCEDURE — 96376 TX/PRO/DX INJ SAME DRUG ADON: CPT

## 2025-04-02 PROCEDURE — 250N000009 HC RX 250: Performed by: STUDENT IN AN ORGANIZED HEALTH CARE EDUCATION/TRAINING PROGRAM

## 2025-04-02 PROCEDURE — 73502 X-RAY EXAM HIP UNI 2-3 VIEWS: CPT

## 2025-04-02 PROCEDURE — 250N000013 HC RX MED GY IP 250 OP 250 PS 637: Performed by: STUDENT IN AN ORGANIZED HEALTH CARE EDUCATION/TRAINING PROGRAM

## 2025-04-02 PROCEDURE — 73502 X-RAY EXAM HIP UNI 2-3 VIEWS: CPT | Mod: 26 | Performed by: RADIOLOGY

## 2025-04-02 PROCEDURE — 81001 URINALYSIS AUTO W/SCOPE: CPT | Performed by: STUDENT IN AN ORGANIZED HEALTH CARE EDUCATION/TRAINING PROGRAM

## 2025-04-02 PROCEDURE — 250N000011 HC RX IP 250 OP 636: Performed by: STUDENT IN AN ORGANIZED HEALTH CARE EDUCATION/TRAINING PROGRAM

## 2025-04-02 PROCEDURE — 97161 PT EVAL LOW COMPLEX 20 MIN: CPT | Mod: GP

## 2025-04-02 PROCEDURE — 97530 THERAPEUTIC ACTIVITIES: CPT | Mod: GP

## 2025-04-02 PROCEDURE — 84484 ASSAY OF TROPONIN QUANT: CPT | Performed by: STUDENT IN AN ORGANIZED HEALTH CARE EDUCATION/TRAINING PROGRAM

## 2025-04-02 PROCEDURE — 99222 1ST HOSP IP/OBS MODERATE 55: CPT | Performed by: INTERNAL MEDICINE

## 2025-04-02 PROCEDURE — 96375 TX/PRO/DX INJ NEW DRUG ADDON: CPT

## 2025-04-02 PROCEDURE — 80048 BASIC METABOLIC PNL TOTAL CA: CPT | Performed by: STUDENT IN AN ORGANIZED HEALTH CARE EDUCATION/TRAINING PROGRAM

## 2025-04-02 PROCEDURE — 87641 MR-STAPH DNA AMP PROBE: CPT | Performed by: STUDENT IN AN ORGANIZED HEALTH CARE EDUCATION/TRAINING PROGRAM

## 2025-04-02 PROCEDURE — 85014 HEMATOCRIT: CPT | Performed by: STUDENT IN AN ORGANIZED HEALTH CARE EDUCATION/TRAINING PROGRAM

## 2025-04-02 PROCEDURE — 250N000011 HC RX IP 250 OP 636: Performed by: INTERNAL MEDICINE

## 2025-04-02 RX ORDER — FENTANYL CITRATE 50 UG/ML
25 INJECTION, SOLUTION INTRAMUSCULAR; INTRAVENOUS
Status: ACTIVE | OUTPATIENT
Start: 2025-04-02

## 2025-04-02 RX ORDER — HYDROMORPHONE HYDROCHLORIDE 2 MG/1
2 TABLET ORAL
Status: DISPENSED | OUTPATIENT
Start: 2025-04-02

## 2025-04-02 RX ORDER — PREDNISONE 20 MG/1
40 TABLET ORAL DAILY
Status: DISPENSED | OUTPATIENT
Start: 2025-04-03

## 2025-04-02 RX ORDER — LIDOCAINE 40 MG/G
CREAM TOPICAL
Status: ACTIVE | OUTPATIENT
Start: 2025-04-02

## 2025-04-02 RX ORDER — FENTANYL CITRATE 50 UG/ML
50 INJECTION, SOLUTION INTRAMUSCULAR; INTRAVENOUS ONCE
Status: COMPLETED | OUTPATIENT
Start: 2025-04-02 | End: 2025-04-02

## 2025-04-02 RX ORDER — CALCIUM CARBONATE 500 MG/1
1000 TABLET, CHEWABLE ORAL 4 TIMES DAILY PRN
Status: ACTIVE | OUTPATIENT
Start: 2025-04-02

## 2025-04-02 RX ORDER — METHOCARBAMOL 500 MG/1
500 TABLET, FILM COATED ORAL EVERY 6 HOURS PRN
Status: DISPENSED | OUTPATIENT
Start: 2025-04-02

## 2025-04-02 RX ORDER — TIMOLOL MALEATE 5 MG/ML
1 SOLUTION/ DROPS OPHTHALMIC DAILY
Status: DISPENSED | OUTPATIENT
Start: 2025-04-02

## 2025-04-02 RX ORDER — NALOXONE HYDROCHLORIDE 0.4 MG/ML
0.4 INJECTION, SOLUTION INTRAMUSCULAR; INTRAVENOUS; SUBCUTANEOUS
Status: ACTIVE | OUTPATIENT
Start: 2025-04-02

## 2025-04-02 RX ORDER — METHYLPREDNISOLONE SODIUM SUCCINATE 125 MG/2ML
125 INJECTION INTRAMUSCULAR; INTRAVENOUS EVERY 8 HOURS
Status: COMPLETED | OUTPATIENT
Start: 2025-04-02 | End: 2025-04-03

## 2025-04-02 RX ORDER — NALOXONE HYDROCHLORIDE 0.4 MG/ML
0.2 INJECTION, SOLUTION INTRAMUSCULAR; INTRAVENOUS; SUBCUTANEOUS
Status: ACTIVE | OUTPATIENT
Start: 2025-04-02

## 2025-04-02 RX ORDER — CEFTRIAXONE 2 G/1
2 INJECTION, POWDER, FOR SOLUTION INTRAMUSCULAR; INTRAVENOUS EVERY 24 HOURS
Status: DISPENSED | OUTPATIENT
Start: 2025-04-02

## 2025-04-02 RX ORDER — POLYETHYLENE GLYCOL 3350 17 G/17G
17 POWDER, FOR SOLUTION ORAL 2 TIMES DAILY PRN
Status: ACTIVE | OUTPATIENT
Start: 2025-04-02

## 2025-04-02 RX ORDER — ONDANSETRON 4 MG/1
4 TABLET, ORALLY DISINTEGRATING ORAL EVERY 6 HOURS PRN
Status: ACTIVE | OUTPATIENT
Start: 2025-04-02

## 2025-04-02 RX ORDER — VITAMIN B COMPLEX
25 TABLET ORAL EVERY OTHER DAY
Status: DISPENSED | OUTPATIENT
Start: 2025-04-02

## 2025-04-02 RX ORDER — MULTIVITAMIN WITH IRON
500 TABLET ORAL DAILY
Status: DISPENSED | OUTPATIENT
Start: 2025-04-02

## 2025-04-02 RX ORDER — VIT C/E/ZN/COPPR/LUTEIN/ZEAXAN 60 MG-6 MG
1 CAPSULE ORAL 2 TIMES DAILY
Status: DISPENSED | OUTPATIENT
Start: 2025-04-02

## 2025-04-02 RX ORDER — LIDOCAINE 4 G/G
3 PATCH TOPICAL EVERY 24 HOURS
Status: DISPENSED | OUTPATIENT
Start: 2025-04-02

## 2025-04-02 RX ORDER — POTASSIUM CHLORIDE 750 MG/1
20 CAPSULE, EXTENDED RELEASE ORAL DAILY
Status: DISPENSED | OUTPATIENT
Start: 2025-04-02

## 2025-04-02 RX ORDER — OMEPRAZOLE 20 MG/1
40 CAPSULE, DELAYED RELEASE ORAL DAILY
Status: DISPENSED | OUTPATIENT
Start: 2025-04-02

## 2025-04-02 RX ORDER — AMOXICILLIN 250 MG
2 CAPSULE ORAL 2 TIMES DAILY PRN
Status: ACTIVE | OUTPATIENT
Start: 2025-04-02

## 2025-04-02 RX ORDER — FLUTICASONE FUROATE AND VILANTEROL 100; 25 UG/1; UG/1
1 POWDER RESPIRATORY (INHALATION) DAILY
Status: DISCONTINUED | OUTPATIENT
Start: 2025-04-02 | End: 2025-04-02

## 2025-04-02 RX ORDER — FUROSEMIDE 20 MG/1
40 TABLET ORAL DAILY
Status: DISPENSED | OUTPATIENT
Start: 2025-04-02

## 2025-04-02 RX ORDER — AMOXICILLIN 250 MG
1 CAPSULE ORAL 2 TIMES DAILY PRN
Status: ACTIVE | OUTPATIENT
Start: 2025-04-02

## 2025-04-02 RX ORDER — ACETAMINOPHEN 325 MG/1
975 TABLET ORAL EVERY 8 HOURS
Status: DISPENSED | OUTPATIENT
Start: 2025-04-02

## 2025-04-02 RX ORDER — ONDANSETRON 2 MG/ML
4 INJECTION INTRAMUSCULAR; INTRAVENOUS EVERY 6 HOURS PRN
Status: ACTIVE | OUTPATIENT
Start: 2025-04-02

## 2025-04-02 RX ORDER — ROSUVASTATIN CALCIUM 10 MG/1
10 TABLET, COATED ORAL DAILY
Status: DISPENSED | OUTPATIENT
Start: 2025-04-02

## 2025-04-02 RX ORDER — ALLOPURINOL 100 MG/1
100 TABLET ORAL DAILY
Status: DISPENSED | OUTPATIENT
Start: 2025-04-02

## 2025-04-02 RX ADMIN — ISOSORBIDE MONONITRATE 90 MG: 60 TABLET, EXTENDED RELEASE ORAL at 07:45

## 2025-04-02 RX ADMIN — ACETAMINOPHEN 975 MG: 325 TABLET, FILM COATED ORAL at 21:56

## 2025-04-02 RX ADMIN — OMEPRAZOLE 40 MG: 20 CAPSULE, DELAYED RELEASE ORAL at 07:45

## 2025-04-02 RX ADMIN — METHYLPREDNISOLONE SODIUM SUCCINATE 125 MG: 125 INJECTION, POWDER, FOR SOLUTION INTRAMUSCULAR; INTRAVENOUS at 10:16

## 2025-04-02 RX ADMIN — METOPROLOL TARTRATE 50 MG: 50 TABLET, FILM COATED ORAL at 07:45

## 2025-04-02 RX ADMIN — Medication 1 CAPSULE: at 10:15

## 2025-04-02 RX ADMIN — Medication 1250 MG: at 06:11

## 2025-04-02 RX ADMIN — Medication 100 MG: at 07:45

## 2025-04-02 RX ADMIN — AMLODIPINE BESYLATE 5 MG: 5 TABLET ORAL at 07:45

## 2025-04-02 RX ADMIN — TIMOLOL MALEATE 1 DROP: 5 SOLUTION/ DROPS OPHTHALMIC at 12:24

## 2025-04-02 RX ADMIN — SODIUM BICARBONATE 650 MG TABLET 650 MG: at 19:33

## 2025-04-02 RX ADMIN — Medication 25 MCG: at 10:15

## 2025-04-02 RX ADMIN — METHYLPREDNISOLONE SODIUM SUCCINATE 125 MG: 125 INJECTION, POWDER, FOR SOLUTION INTRAMUSCULAR; INTRAVENOUS at 16:01

## 2025-04-02 RX ADMIN — CYANOCOBALAMIN TAB 500 MCG 500 MCG: 500 TAB at 10:15

## 2025-04-02 RX ADMIN — CLOPIDOGREL BISULFATE 75 MG: 75 TABLET ORAL at 07:45

## 2025-04-02 RX ADMIN — CEFTRIAXONE SODIUM 2 G: 2 INJECTION, POWDER, FOR SOLUTION INTRAMUSCULAR; INTRAVENOUS at 05:28

## 2025-04-02 RX ADMIN — FENTANYL CITRATE 50 MCG: 50 INJECTION, SOLUTION INTRAMUSCULAR; INTRAVENOUS at 00:44

## 2025-04-02 RX ADMIN — Medication 1 CAPSULE: at 19:33

## 2025-04-02 RX ADMIN — HYDROMORPHONE HYDROCHLORIDE 2 MG: 2 TABLET ORAL at 20:02

## 2025-04-02 RX ADMIN — LOSARTAN POTASSIUM 25 MG: 25 TABLET, FILM COATED ORAL at 19:33

## 2025-04-02 RX ADMIN — ROSUVASTATIN 10 MG: 10 TABLET, FILM COATED ORAL at 10:15

## 2025-04-02 RX ADMIN — METHOCARBAMOL 500 MG: 500 TABLET ORAL at 18:02

## 2025-04-02 RX ADMIN — ACETAMINOPHEN 975 MG: 325 TABLET, FILM COATED ORAL at 05:28

## 2025-04-02 RX ADMIN — SODIUM BICARBONATE 650 MG TABLET 650 MG: at 07:45

## 2025-04-02 RX ADMIN — METOPROLOL TARTRATE 50 MG: 50 TABLET, FILM COATED ORAL at 19:34

## 2025-04-02 RX ADMIN — FENTANYL CITRATE 50 MCG: 50 INJECTION INTRAMUSCULAR; INTRAVENOUS at 04:27

## 2025-04-02 ASSESSMENT — ACTIVITIES OF DAILY LIVING (ADL)
ADLS_ACUITY_SCORE: 38
ADLS_ACUITY_SCORE: 39
ADLS_ACUITY_SCORE: 38
ADLS_ACUITY_SCORE: 37
ADLS_ACUITY_SCORE: 38
ADLS_ACUITY_SCORE: 38
ADLS_ACUITY_SCORE: 39
ADLS_ACUITY_SCORE: 39
ADLS_ACUITY_SCORE: 57
ADLS_ACUITY_SCORE: 39
ADLS_ACUITY_SCORE: 37
ADLS_ACUITY_SCORE: 57
ADLS_ACUITY_SCORE: 39
ADLS_ACUITY_SCORE: 38
ADLS_ACUITY_SCORE: 38
ADLS_ACUITY_SCORE: 57
ADLS_ACUITY_SCORE: 39
ADLS_ACUITY_SCORE: 41
ADLS_ACUITY_SCORE: 41
ADLS_ACUITY_SCORE: 38
ADLS_ACUITY_SCORE: 41
ADLS_ACUITY_SCORE: 39
ADLS_ACUITY_SCORE: 38

## 2025-04-02 NOTE — H&P
Saw and examined patient ant bed side  Please see H&P by resident Physician Dr. Hayley Severson, who discussed this patient with me   Briefly, Tessa Mansfield is a 89 year old female admitted on 4/1/2025. She has a history of HTN, CKD4, CAD s/p PCI, Afib s/p Watchman (8/2024) on Plavix, recurrent GIB, h/o DVT in 2010. She is admitted for severe worsening pain, particularly in the right knee, back, hip, and foot c/f trauma vs infectious/septic vs inflammatory (gout, pseudogout) cause of pain. She requires admission for pain management, PT/OT assessment, and further evaluation and management.   Patient has had extensive tests and evaluation  No notable fevers    O/E. Rt knee swollen, warm and tender.  Bruising on the face secondary to fall  Irregular heart sounds  Soft abdomen   Alert and orientated X 3    Labs reviewed    Synovial analysis notable for calcium pyrophospate crystals      Impression:   Acute pseudogout involving multiple joints    Plan:  Given the significant pain, initiate on opioids for pain control as loyda has CKD stage 4, and NSAIDS are contraindicated  Unable to give colchicine due to CHD. Therefore, will start with IV steroids for immediate relief abd control of gouty inflammation.  PT/OT evaluation   Synovial fluid has significantly elevated neutrophils, which can be seen in inflammatory arthropathies. Culture pending. Blood cultures done. No fevers noted. Discussed with ID team, and we feel that the likelihood of infection is low. This will stop IV abx for now, but loyda will need cntinued monitoring wile we wait for synovial fluid cultures     Resume home meds for chronic medical problems outlined in H&P    Dr ROSY Goel MD, [facility]  Hospitalist ( Internal medicine)  Pager: [phone]

## 2025-04-02 NOTE — PROGRESS NOTES
04/02/25 1500   Appointment Info   Signing Clinician's Name / Credentials (PT) Hermes Fuller DPT   Rehab Comments (PT) WBAT BLE per discussion with Dr. Goel   Living Environment   People in Home spouse   Current Living Arrangements house   Home Accessibility stairs to enter home;stairs within home   Number of Stairs, Main Entrance 2   Stair Railings, Main Entrance none   Number of Stairs, Within Home, Primary seven   Stair Railings, Within Home, Primary railings on both sides of stairs   Transportation Anticipated family or friend will provide   Living Environment Comments Pt lives in multi level home, no steps through front door, 2 DAVEY through garage with no rails. Then has 7 steps up to bedroom and bathroom level, 7 steps down to main level with living area and kitchen, then additional flight down to basement for laundry.   Self-Care   Usual Activity Tolerance good   Current Activity Tolerance fair   Equipment Currently Used at Home none   Fall history within last six months yes   Number of times patient has fallen within last six months 1   Activity/Exercise/Self-Care Comment Pt is IND with ADLs at baseline, IND with mobility, has FWW, 4ww, w/c at home but was not using.  able to assist with cooking, no physical assistance available   General Information   Onset of Illness/Injury or Date of Surgery 04/01/25   Referring Physician Severson, Hayley, MD   Patient/Family Therapy Goals Statement (PT) decrease pain, improve mobility   Pertinent History of Current Problem (include personal factors and/or comorbidities that impact the POC) Per EMR: Tessa Mansfield is a 89 year old female admitted on 4/1/2025. She has a history of HTN, CKD4, CAD s/p PCI, Afib s/p Watchman (8/2024) on Plavix, recurrent GIB, h/o DVT in 2010. She is admitted for severe worsening pain, particularly in the right knee, back, hip, and foot c/f trauma vs infectious/septic vs inflammatory (gout, pseudogout) cause of pain. She  requires admission for pain management, PT/OT assessment, and further evaluation and management.   Existing Precautions/Restrictions fall   Weight-Bearing Status - LUE full weight-bearing   Weight-Bearing Status - RUE full weight-bearing   Weight-Bearing Status - LLE weight-bearing as tolerated   Weight-Bearing Status - RLE weight-bearing as tolerated   General Observations Activity Up with assist   Cognition   Affect/Mental Status (Cognition) WFL   Orientation Status (Cognition) oriented x 4   Follows Commands (Cognition) WFL   Pain Assessment   Patient Currently in Pain Yes, see Vital Sign flowsheet   Integumentary/Edema   Integumentary/Edema Comments scattered bruising on LEs and face   Posture    Posture Forward head position;Protracted shoulders   Range of Motion (ROM)   Range of Motion ROM deficits secondary to pain   Strength (Manual Muscle Testing)   Strength (Manual Muscle Testing) Deficits observed during functional mobility   Bed Mobility   Comment, (Bed Mobility) Erick supine > sit   Transfers   Comment, (Transfers) Erick STS from EOB   Gait/Stairs (Locomotion)   Comment, (Gait/Stairs) unable to functionally ambulate this date   Balance   Balance Comments needing UE support in standing, SBA sitting EOB   Sensory Examination   Sensory Perception patient reports no sensory changes   Clinical Impression   Criteria for Skilled Therapeutic Intervention Yes, treatment indicated   PT Diagnosis (PT) impaired functional mobility   Influenced by the following impairments pain, weakness, deconditioning   Functional limitations due to impairments bed mobility, transfers, gait, stairs, activity tolerance   Clinical Presentation (PT Evaluation Complexity) stable   Clinical Presentation Rationale clinical reasoning   Clinical Decision Making (Complexity) low complexity   Planned Therapy Interventions (PT) balance training;bed mobility training;gait training;home exercise program;neuromuscular re-education;patient/family  education;ROM (range of motion);stair training;strengthening;stretching;transfer training;progressive activity/exercise;risk factor education;home program guidelines   Risk & Benefits of therapy have been explained evaluation/treatment results reviewed;care plan/treatment goals reviewed;risks/benefits reviewed;current/potential barriers reviewed;participants voiced agreement with care plan;participants included;patient   PT Total Evaluation Time   PT Eval, Low Complexity Minutes (62947) 5   Physical Therapy Goals   PT Frequency 6x/week   PT Predicted Duration/Target Date for Goal Attainment 04/19/25   PT Goals Bed Mobility;Transfers;Gait;Stairs;PT Goal 1   PT: Bed Mobility Independent;Supine to/from sit;Rolling;Bridging   PT: Transfers Modified independent;Sit to/from stand;Bed to/from chair;Assistive device   PT: Gait Modified independent;Rolling walker;150 feet   PT: Stairs Supervision/stand-by assist;7 stairs;Rail on both sides   PT: Goal 1 Pt will be IND with LE HEP to progress LE strength and function   Interventions   Interventions Quick Adds Therapeutic Activity   Therapeutic Activity   Therapeutic Activities: dynamic activities to improve functional performance Minutes (36730) 24   Symptoms Noted During/After Treatment Fatigue;Increased pain   Treatment Detail/Skilled Intervention post eval, continued functional mobility training to progress IND. Needing cues for STS from EOB with hand placement and forward lean as pt unable to stand on own, able to perform with Erick and then pivot with FWW over to commode. STS and pivot from commode with CGA, needing assist for pericares. In recliner, discussing PT POC, d/c recs with edu on TCU to progress rehab and IND prior to returning home. Edu on continued mobility program, continued pivots to commode with nursing, trial for wlaking with PT tomorrow. Ends up in recliner, all needs met within reach.   PT Discharge Planning   PT Plan initiate gait able, LE strengthening  and STS   PT Discharge Recommendation (DC Rec) Transitional Care Facility   PT Rationale for DC Rec Pt below baseline IND, needing Ax1 for all mobility, unable to ambulate houshold distances or perform stairs at this time. Pt's  unable to assist much at home, recommend TCU at this time to progress IND   PT Brief overview of current status Ax1 pivots with FWW   PT Total Distance Amb During Session (feet) 3   Physical Therapy Time and Intention   Timed Code Treatment Minutes 24   Total Session Time (sum of timed and untimed services) 29

## 2025-04-02 NOTE — PLAN OF CARE
"Goal Outcome Evaluation:      Plan of Care Reviewed With: patient    Overall Patient Progress: no changeOverall Patient Progress: no change     4/2/2025:  5 ortho Admission Note    Reason for admission: Increasing R knee pain and swelling  Primary team notified of pt arrival.  Admitted from: [facility]  Via: ambulance   Accompanied by: Self  Belongings: Placed in closet; valuables sent home with family  Admission Required Doc Completed: Yes, majority of it   Mobility Devices Utilized by Patient Provided (i.e. walker, wheelchair, etc.): Yes, walker  Teaching: Orientation to unit and call light- call light within reach, use of console, meal times, when to call for the RN, and enforced importance of safety.  IV Access: R PIV  Telemetry: Yes  Ht./Wt.: Completed  Code Status verified on armband: Yes  2 RN Skin Assessment Completed with: Janak GARCIA, RN  Suction/Ambu bag/Flowmeter at bedside: Yes    Pt status:     Temp:  [97.9  F (36.6  C)-98.1  F (36.7  C)] 97.9  F (36.6  C)  Pulse:  [] 95  Resp:  [16-18] 16  BP: (118-195)/() 143/79  SpO2:  [95 %-100 %] 96 %      VS: BP (!) 143/79 (BP Location: Right arm, Patient Position: Right side, Cuff Size: Adult Regular)   Pulse 95   Temp 97.9  F (36.6  C) (Oral)   Resp 16   Ht 1.676 m (5' 6\")   SpO2 96%   BMI 22.60 kg/m      O2: > 92% on RA, denies SOB and chest pain   Output: Purewick in place   Last BM: 3/28/2025   Activity: Ax2 with walker and gait belt, pt not OOB yet due to pain   Skin: Blanchable redness to coccyx  Bilateral dry heels  Extensive bruising to entire body and face from fall     Pain: Pt reports pain basically everywhere but worst is in her R knee, back and hips, pain managed with PRN's   CMS: AO x4, denies numbness and tingling    Dressing: None   Diet: Regular    LDA: L PIV infusing vancomycin    Equipment: IV pole, call light, walker, GB and personal belongings    Plan: Continue POC   Additional Info:              "

## 2025-04-02 NOTE — H&P
[facility]    History and Physical - Hospitalist Service, GOLD TEAM        Date of Admission:  4/1/2025    Assessment & Plan      Tessa Mansfield is a 89 year old female admitted on 4/1/2025. She has a history of HTN, CKD4, CAD s/p PCI, Afib s/p Watchman (8/2024) on Plavix, recurrent GIB, h/o DVT in 2010. She is admitted for severe worsening pain, particularly in the right knee, back, hip, and foot c/f trauma vs infectious/septic vs inflammatory (gout, pseudogout) cause of pain. She requires admission for pain management, PT/OT assessment, and further evaluation and management.     Right knee pain and effusion, inability to bear weight c/f septic joint  Leukocytosis + elevated inflammatory markers  Recent fall with significant trauma + generalized weakness  History of osteoporosis and osteoarthritis  Degenerative disc disease + neural foraminal stenosis at L4-L5, L5-S1  Patient recently fell on 3/25 while walking to her garage (mechanical, no loss of consciousness). She fell forward and hit her face with resultant bruising with additional lower back pain, right hip pain, right knee pain, and generalized body aches. Has significant ecchymoses on body + left periorbital hematoma. No headaches, lightheadedness or dizziness, fevers, chills, vision changes. Concern for infection/septic joint causing the knee effusion (ie bacteria in place after the previous traumatic effusion) given leukocytosis, elevated procal, CRP, and ESR, though there is a reassuring lack of fevers. She has not had a gout flare in many years and takes allopurinol, so also low suspicion for this though cannot completely rule this out (or pseudogout). On imaging, it appears that she has a Baker's cyst and there was possibility that it had previously ruptured. Patient is unable to bear weight on the right LE and was in severe pain, thus brought to the ED. Joint was tapped and studies are pending.  Fortunately, neurologically she is intact with good sensation and movement mostly limited by pain.   - Will start antibiotics with CTX and vancomycin empirically; MRSA swab pending  - Follow-up fluid studies for further elucidation of etiology  - Extensive imaging of the knee performed on arrival to the ED on 4/1; previous imaging of the head/face/back/neck negative for acute fracture on 3/25 but no dedicated hip x-ray performed --> right hip x-ray ordered + RLE US ordered given h/o DVT  - PT/OT consults for evaluation of dispo needs  - Consider orthopedics consult pending results  - If back pain worsens, consider repeat imaging +/- NSGY consult  - Pain management: heat/cold, Tylenol scheduled, lidocaine patches, voltaren gel, robaxin PRN; patient has allergies to most opioids so will hold for now and given PRN doses of fentanyl for severe breakthrough pain. Would avoid Tramadol in the future given CKD.     Hypertension, poorly controlled + Hyperlipidemia  CAD s/p PCI (multiple stents, most recent 2013)  HFpEF (60-65%), compensated  Afib s/p Watchman only on Plavix  History of complete heart block s/p PPM placement (2007)  History of DVT with infrarenal IVC filter  Patient with significant heart history including multiple episodes of stenting, cardiac pacemaker placement due to history of complete heart block, and Afib with Watchman placement due to inability to tolerate long-term AC, now only on Plavix. Recent MICHELL in 10/2024 showed LVEF of 60-65%, moderate mitral and tricuspid valve regurgitation. On arrival to the ED, patient with significant hypertension and SBPs in the 200s, Afib with HR in the 160s (RVR) but asymptomatic including no chest pain, shortness of breath, palpitations. She was given diltiazem IV and then her home medications with improvement in rates and BP (also likely some component of pain control with improvement).   - Continue PTA meds: Amlodipine 5 mg daily, Imdur 90 mg daily, Losartan 25 mg  BID, metoprolol tartrate 50 mg BID, rosuvastatin  - Continue Plavix, does not appear to be on ASA currently (was d/c'd 11/2024 per Cards)  - Continue PTA Lasix PO for now as Cr seems at baseline (hold if plan for procedure)  - Trend troponin to peak (suspect demand as no chest pain); repeat EKG with clinical change  - Telemetry at least 24-48 hours given Afib with RVR    CKD Stage 4  History of hypokalemia  Secondary renal hyperparathyroidism  At baseline creatinine on arrival to the ED 1.85, with baseline anywhere from 1.65-2.25 over the past couple years.   - Continue PTA vitamins, sodium bicarb  - Hold PTA potassium for now, replace as needed    Chronic conditions:  Normocytic anemia of chronic disease, chronic thrombocytopenia: Continue PTA vitamin B12 and vitamin D3; follow-up outpatient (has received BREANNA and venofer in the past)  Gout: PTA allopurinol  GERD: PTA omeprazole  KEVYN: CPAP at night/while asleep; continuous pulse ox  Asthma/COPD: Continue PTA Advair              Diet: Combination Diet Regular Diet    DVT Prophylaxis: Pneumatic Compression Devices  Bonner Catheter: Not present  Fluids: None  Lines: None     Cardiac Monitoring: None  Code Status:  FULL    Clinically Significant Risk Factors Present on Admission                # Coagulation Defect: INR = 1.27 (Ref range: 0.85 - 1.15) and/or PTT = N/A, will monitor for bleeding  # Drug Induced Platelet Defect: home medication list includes an antiplatelet medication   # Hypertension: Noted on problem list  # Chronic heart failure with preserved ejection fraction: heart failure noted on problem list and last echo with EF >50%              # Financial/Environmental Concerns:     # Pacemaker present       Disposition Plan      Expected Discharge Date: 04/03/2025                    Hayley Severson, MD  Hospitalist Service, [facility]  Securely message with [employer] (more info)  Text page via [employer]  Paging/Directory   See signed in provider for up to date coverage information  ______________________________________________________________________    Chief Complaint   Right knee pain and effusion, inability to bear weight    History is obtained from the patient    History of Present Illness   Tessa Mansfield is a 89 year old female who presents with right knee pain and swelling, inability to bear weight after a fall on 3/25. Patient recently fell on 3/25 while walking to her garage (mechanical, no loss of consciousness). She fell forward and hit her face with resultant bruising with additional lower back pain, right hip pain, right knee pain, and generalized body aches. Has significant ecchymoses on body + left periorbital hematoma. No headaches, lightheadedness or dizziness, fevers, chills, vision changes. Patient is unable to bear weight on the right LE and was in severe pain, thus brought to the ED. Joint was tapped and studies are pending. Fortunately, neurologically she is intact with good sensation and movement mostly limited by pain. On arrival to the ED, patient with significant hypertension and SBPs in the 200s, Afib with HR in the 160s (RVR) but asymptomatic including no chest pain, shortness of breath, palpitations.    See ED note and above for further ED course.       Past Medical History    Past Medical History:   Diagnosis Date    CAD (coronary artery disease)     CAD s/p PCI+BMS to MRCA 10/2002, PCI to mLCX 2/2003, PCI+DESx2 to pLAD 11/2007, PCI+DESx1 to dRCA 12/2007, PCI+ALVAREZ to RPDA 7/2013; on indefinite DAPT  10/27/2002    10/27/2002: AMI s/p PCI+BMS (3.5x18mm Bx velocity) to mRCA 2/5/2003: Back pain; PCI+stent to mLCX c/b distal embolization/slow flow 4/11/2003: Unstable angina; PCI+stent (Hepacoat velocity) to mLAD 11/27/2007: PCI+DESx2 to pLAD (IVUS w/ calcification of LMCA and ulcerated plaque pLAD, 80% dRCA; also had 80% dLCX, too small to stent) 12/11/2007: Staged PCI. PCI+DESx1 (3.5x13mm  Cypher) to dRCA (indefinite DAPT recommended at this time) 6/27/2008: Angina. mLCX stent 70% ISR. LAD and RCA stents patent. Myocardium at risk from LCX felt to be small, medical management preferred to PCI. 11/10/2009: Angina. Findings unchanged from 6/27/2008, FFR LAD 0.90. Medical management recommended. 7/23/2013: Unstable angina; PCI+ALVAREZ to mPDA. Diagnostic findings: LMCA 40% distal. LAD: pLAD stents patent mLAD 30% ISR dLAD 50% diffuse, D2 diffuse disease. LCX 80% mid ISR. RCA diffuse <30% mid, RPLAs diffuse disease, RPDA 100% occlusion.      Cardiac Pacemaker- [employer]tronic, dual chamber- NOT dependant 11/28/2007    CKD (chronic kidney disease), stage IV (H)     Hyperlipidemia LDL goal <70     Hypertension     Stented coronary artery 10-    RCA    Stented coronary artery 2-5-2003    LCx    Stented coronary artery 4-    LAD    Stented coronary artery 11-    LAD    Stented coronary artery 12-    RCA    Transient complete heart block (H) 11/28/2007       Past Surgical History   Past Surgical History:   Procedure Laterality Date    CHOLECYSTECTOMY      CV CORONARY ANGIOGRAM N/A 6/17/2022    Procedure: Coronary Angiogram;  Surgeon: Constantine Macias MD;  Location: [facility] CARDIAC CATH LAB    CV CORONARY ANGIOGRAM N/A 7/17/2023    Procedure: Coronary Angiogram;  Surgeon: Constantine Macias MD;  Location: [facility] CARDIAC CATH LAB    CV LEFT ATRIAL APPENDAGE CLOSURE N/A 8/8/2024    Procedure: Left Atrial Appendage Closure;  Surgeon: Rodrick Garcia MD;  Location:  HEART CARDIAC CATH LAB    CV PCI N/A 6/17/2022    Procedure: Percutaneous Coronary Intervention;  Surgeon: Constantine Macias MD;  Location:  HEART CARDIAC CATH LAB    CV PCI N/A 7/17/2023    Procedure: Percutaneous Coronary Intervention;  Surgeon: Constantine Macias MD;  Location: [facility] CARDIAC CATH LAB    CV PCI N/A 11/6/2023    Procedure: Percutaneous Coronary Intervention;  Surgeon: Gilberto  MD Constantine;  Location:  HEART CARDIAC CATH LAB    CV PERICARDIOCENTESIS N/A 8/8/2024    Procedure: Pericardiocentesis;  Surgeon: Rodrick Garcia MD;  Location:  HEART CARDIAC CATH LAB    CV PERICARDIOCENTESIS N/A 8/11/2024    Procedure: Pericardial Drain Adjustment, Possible New Pericardial Drain Placement;  Surgeon: Rodrick Garcia MD;  Location:  HEART CARDIAC CATH LAB    CV RIGHT HEART CATH MEASUREMENTS RECORDED N/A 6/17/2022    Procedure: Right Heart Catheterization;  Surgeon: Constantine aMcias MD;  Location:  HEART CARDIAC CATH LAB    EP PACEMAKER N/A 10/15/2019    Procedure: EP PACEMAKER;  Surgeon: Anthony Zhu MD;  Location:  HEART CARDIAC CATH LAB    ESOPHAGOSCOPY, GASTROSCOPY, DUODENOSCOPY (EGD), COMBINED N/A 7/20/2023    Procedure: Esophagoscopy, gastroscopy, duodenoscopy (EGD), combined;  Surgeon: Bekah Dash DO;  Location:  GI    ESOPHAGOSCOPY, GASTROSCOPY, DUODENOSCOPY (EGD), COMBINED N/A 5/2/2024    Procedure: Esophagoscopy, gastroscopy, duodenoscopy (EGD), combined;  Surgeon: Tavo Donaldson MD;  Location:  GI    HC PPM INSERTION NEW/REPLACEMENT W/ ATRIAL&VENTRICULAR LEAD  11-    HYSTERECTOMY      LAPAROTOMY EXPLORATORY N/A 4/13/2024    Procedure: Laparotomy exploratory, Wound Vac Placement.;  Surgeon: Anthony Trammell MD;  Location: [facility] OR    LAPAROTOMY EXPLORATORY N/A 4/14/2024    Procedure: Abdominal Re-Exploration and Closure;  Surgeon: Anthony Trammell MD;  Location: [facility] OR    LAPAROTOMY EXPLORATORY N/A 4/13/2024    Procedure: Exploratory Laparotomy;  Surgeon: Anthony Trammell MD;  Location: [facility] OR       Prior to Admission Medications   Prior to Admission Medications   Prescriptions Last Dose Informant Patient Reported? Taking?   BUDESONIDE IN   Yes No   Sig: Inhale into the lungs.   Multiple Vitamins-Minerals (PRESERVISION AREDS 2+MULTI VIT PO)  Self Yes No   Sig: Take 1 capsule by mouth 2 times daily   Psyllium (METAMUCIL PO)    Yes No   Sig: Take by mouth.   acetaminophen (TYLENOL) 325 MG tablet  Self Yes No   Sig: Take 975 mg by mouth nightly as needed for pain   allopurinol (ZYLOPRIM) 100 MG tablet   No No   Sig: TAKE 1 TABLET BY MOUTH ONCE DAILY *NEEDS TO HAVE LABS FOR FURTHER REFILLS*   amLODIPine (NORVASC) 5 MG tablet   No No   Sig: Take 1 tablet (5 mg) by mouth daily.   clopidogrel (PLAVIX) 75 MG tablet   No No   Sig: Take 1 tablet (75 mg) by mouth daily.   fluticasone-salmeterol (ADVAIR) 250-50 MCG/ACT inhaler  Self No No   Sig: INHALE 1 DOSE BY MOUTH TWICE DAILY   furosemide (LASIX) 40 MG tablet  Self No No   Sig: Take 1 tablet (40 mg) by mouth daily May take an additional 20 mg at noon as needed for swelling.   isosorbide mononitrate (IMDUR) 30 MG 24 hr tablet   No No   Sig: Take 3 tablets (90 mg) by mouth daily.   lidocaine (LIDODERM) 5 % patch   No No   Sig: Place 1 patch over 12 hours onto the skin every 24 hours. To prevent lidocaine toxicity, patient should be patch free for 12 hrs daily.   losartan (COZAAR) 25 MG tablet   No No   Sig: Take 1 tablet (25 mg) by mouth 2 times daily.   methocarbamol (ROBAXIN) 500 MG tablet   No No   Sig: Take 1 tablet (500 mg) by mouth every 6 hours as needed for muscle spasms.   metoprolol tartrate (LOPRESSOR) 25 MG tablet   No No   Sig: Take 2 tablets (50 mg) by mouth 2 times daily.   omeprazole (PRILOSEC) 40 MG DR capsule   No No   Sig: Take 1 capsule (40 mg) by mouth daily.   potassium chloride ER (K-TAB) 20 MEQ CR tablet  Self No No   Sig: Take 1 tablet (20 mEq) by mouth daily   rosuvastatin (CRESTOR) 20 MG tablet   No No   Sig: Take 1 tablet by mouth once daily   sodium bicarbonate 650 MG tablet   No No   Sig: Take 1 tablet (650 mg) by mouth 2 times daily   timolol maleate (TIMOPTIC) 0.5 % ophthalmic solution  Self Yes No   Sig: INSTILL 1 DROP INTO EACH EYE ONCE DAILY IN THE MORNING   traMADol (ULTRAM) 50 MG tablet 4/1/2025 at 10:00 AM  No Yes   Sig: Take 1 tablet (50 mg) by mouth every  "6 hours as needed for severe pain.   vitamin B-12 (CYANOCOBALAMIN) 500 MCG tablet  Self Yes No   Sig: Take 1 tablet by mouth daily   vitamin D3 25 mcg (1000 units) tablet  Self No No   Sig: Take 1 tablet (25 mcg) by mouth every other day      Facility-Administered Medications: None        Review of Systems    The 10 point Review of Systems is negative other than noted in the HPI or here.    Social History   I have reviewed this patient's social history and updated it with pertinent information if needed.  Social History     Tobacco Use    Smoking status: Former     Current packs/day: 0.30     Average packs/day: 0.3 packs/day for 15.0 years (4.5 ttl pk-yrs)     Types: Cigarettes    Smokeless tobacco: Never    Tobacco comments:     Quit 35+ years ago   Vaping Use    Vaping status: Never Used   Substance Use Topics    Drug use: No     Lives with . Home has stairs. Quit smoking 35 years ago. No alcohol or other substance use.     Allergies   Allergies   Allergen Reactions    Shrimp Swelling    Codeine Sulfate Itching    Indomethacin      Other Reaction(s): Not available    Levofloxacin Hemihydrate      Other Reaction(s): Not available    Morphine Sulfate      Other Reaction(s): Not available    Oxycodone Other (See Comments)    Shrimp Extract Swelling    Sulfamethoxazole W/Trimethoprim      Other Reaction(s): Not available    Chlorthalidone Nausea and Vomiting     Other Reaction(s): Not available    Clarithromycin      Other Reaction(s): Not available    Clonidine      Other Reaction(s): Not available    Darvon [Propoxyphene Hcl]     Gabapentin Confusion and Fatigue     \"felt drunk\"    Other Reaction(s): Not available    Hydromorphone Hallucination and Visual Disturbance     Tolerated in April 2024 hospital admissions    Other Reaction(s): Not available    Indomethacin     Percocet [Oxycodone-Acetaminophen] Hallucination    Pregabalin Fatigue and Itching    Simvastatin Palpitations and Cramps     Muscle weakness, " leg cramping    Spironolactone      Dehydrated    Other Reaction(s): Not available    Terazosin      Other Reaction(s): Not available        Physical Exam   Vital Signs: Temp: 98.1  F (36.7  C) Temp src: Oral BP: 139/83 Pulse: 94   Resp: 16 SpO2: 98 % O2 Device: None (Room air)    Weight: 0 lbs 0 oz    General Appearance: Wakes to voice, alert and answering questions, in mild distress due to pain  HEENT: PERRL, periorbital hematoma and ecchymoses around the left eye, dry MM  Respiratory: CTAB, no increased work of breathing  Cardiovascular: Irregularly irregular, tachycardic, no obvious murmur, WWP  GI: Soft, non-tender, non-distended, active BS  Skin: Scattered ecchymoses on several extremities, face, etc.   MSK: Moving all extremities; significant swelling of the right knee with associated heat but no significant erythema; bruising of the left knee  Neuro: Mild left sided facial droop that she states has been present since the fall (swelling on the left side of her face), otherwise non-focal; good sensation in BLE and able to move feet (other movement limited by pain)    Medical Decision Making     Please see A&P for additional details of medical decision making.      Data     I have personally reviewed the following data over the past 24 hrs:    18.4 (H)  \   11.7   / 95 (L)     139 101 29.3 (H) /  115 (H)   3.7 21 (L) 1.85 (H) \     ALT: 16 AST: 33 AP: 117 TBILI: 1.3 (H)   ALB: 4.0 TOT PROTEIN: 7.6 LIPASE: N/A     Trop: 55 (H) BNP: N/A     Procal: 0.86 (H) CRP: 235.00 (H) Lactic Acid: 1.8       INR:  1.27 (H) PTT:  N/A   D-dimer:  N/A Fibrinogen:  N/A       Imaging results reviewed over the past 24 hrs:   Recent Results (from the past 24 hours)   XR Knee Right 3 Views    Narrative    EXAM: XR KNEE RIGHT 3 VIEWS  LOCATION: [facility]  DATE: 4/1/2025    INDICATION: Fall, pain.  COMPARISON: 06/25/2018.      Impression    IMPRESSION: Bones are markedly demineralized and  there is a large knee joint effusion. Tricompartmental degenerative changes which are severe in the patellofemoral compartment. There is chondrocalcinosis. No evidence of an acute displaced fracture.   CT Knee Right w/o Contrast    Narrative    EXAM: CT KNEE RIGHT W/O CONTRAST  LOCATION: [facility]  DATE: 4/1/2025    INDICATION: fall pain  COMPARISON: None.  TECHNIQUE: Noncontrast. Axial, sagittal and coronal thin-section reconstruction. Dose reduction techniques were used.     FINDINGS:     No acute fracture.    Demineralization. Tricompartmental degenerative arthritis with mild medial and lateral compartment joint space narrowing and chondrocalcinosis. Small marginal osteophytes. Large knee joint effusion. No lipohemarthrosis. Large Baker's cyst. Ill-defined   fluid within the popliteal fossa and tracking along the superficial myofascial plane of the medial head of the gastrocnemius which could reflect prior rupture. Mild fatty atrophy of the musculature about the knee.    Arterial calcifications.        Impression    IMPRESSION:  1.  No acute fracture.  2.  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  3.  Large Baker's cyst with ill-defined fluid tracking along the superficial myofascial plane of the medial head of the gastrocnemius which could reflect prior rupture.       POC US SOFT TISSUE    Impression    Limited Soft Tissue Ultrasound, performed and interpreted by me.    Indication:  pain swelling. Evaluate for cellulitis vs abscess.     Body location: right lower extremity    Findings:  There is no cobblestoning suggestive of cellulitis in the evaluated area. There is a fluid collection measuring moderate to large to suggest effusion. No foreign body identified    IMPRESSION: moderate to large knee effusion          XR Pelvis w Hip Right 1 View    Narrative    EXAM: XR PELVIS AND HIP RIGHT 1 VIEW  LOCATION: [facility]  DATE: 4/2/2025    INDICATION: Right hip pain and bruising after a fall  COMPARISON: None.      Impression    IMPRESSION: Bilateral hip arthroplasties. No hardware complication. No acute fracture.

## 2025-04-02 NOTE — PHARMACY-VANCOMYCIN DOSING SERVICE
Pharmacy Vancomycin Initial Note  Date of Service 2025  Patient's  1935  89 year old, female    Indication: Concern for septic arthritis     Current estimated CrCl = Estimated Creatinine Clearance: 21.1 mL/min (A) (based on SCr of 1.85 mg/dL (H)).    Creatinine for last 3 days  2025:  5:41 PM Creatinine 1.85 mg/dL    Recent Vancomycin Level(s) for last 3 days  No results found for requested labs within last 3 days.      Vancomycin IV Administrations (past 72 hours)        No vancomycin orders with administrations in past 72 hours.                    Nephrotoxins and other renal medications (From now, onward)      Start     Dose/Rate Route Frequency Ordered Stop    25 0800  furosemide (LASIX) tablet 40 mg        Note to Pharmacy: PTA Sig:Take 1 tablet (40 mg) by mouth daily May take an additional 20 mg at noon as needed for swelling.      40 mg Oral DAILY 25 0443      25 0530  vancomycin (VANCOCIN) 1,250 mg in 0.9% NaCl 250 mL intermittent infusion         1,250 mg  over 90 Minutes Intravenous ONCE 25 0502              Contrast Orders - past 72 hours (72h ago, onward)      None            [employer] Prediction of Planned Initial Vancomycin Regimen        Plan:  Start vancomycin 1250 mg IV x1, followed by intermittent vancomycin dosing due to hx of CKD.  Vancomycin therapeutic monitoring goal: 10-15 mg/L  Pharmacy will check vancomycin levels as appropriate in 1-3 Days.    Serum creatinine levels will be ordered daily for the first week of therapy and at least twice weekly for subsequent weeks.      Dallas Brizuela RPH

## 2025-04-02 NOTE — PHARMACY-ADMISSION MEDICATION HISTORY
Pharmacist Admission Medication History    Admission medication history is complete. The information provided in this note is only as accurate as the sources available at the time of the update.    Information Source(s): Patient and CareEverywhere/SureScripts via in-person    Pertinent Information:   Patient was familiar with medications and able confirm medications and doses. PTA med list match prescriptions recently filled with [facility].   Furosemide: patient reports not needing to take extra dose of furosemide and just takes 40 mg daily.     Changes made to PTA medication list:  Added: None  Deleted:   Budesonide inhalation (per patient, no fill hx in past year)  Advair inhaler (per patient, not taking and no fill hx in past year. Has previously reported taking PRN but not taken in months)   Changed:   Lidocaine patch: 1 patch q24h --> 1 patch daily as needed (per patient)   Psyllium PO --> psyllim packet, daily prn (per patient, uses the powder as needed)     Allergies reviewed with patient and updates made in EHR: no    Medication History Completed By: Meghann Hood [facility] 4/2/2025 2:31 PM    PTA Med List   Medication Sig Last Dose/Taking    acetaminophen (TYLENOL) 325 MG tablet Take 975 mg by mouth nightly as needed for pain Past Week    allopurinol (ZYLOPRIM) 100 MG tablet TAKE 1 TABLET BY MOUTH ONCE DAILY *NEEDS TO HAVE LABS FOR FURTHER REFILLS* 4/1/2025    amLODIPine (NORVASC) 5 MG tablet Take 1 tablet (5 mg) by mouth daily. 4/1/2025    clopidogrel (PLAVIX) 75 MG tablet Take 1 tablet (75 mg) by mouth daily. 4/1/2025    furosemide (LASIX) 40 MG tablet Take 1 tablet (40 mg) by mouth daily May take an additional 20 mg at noon as needed for swelling. 4/1/2025    isosorbide mononitrate (IMDUR) 30 MG 24 hr tablet Take 3 tablets (90 mg) by mouth daily. 4/1/2025    lidocaine (LIDODERM) 5 % patch Place 1 patch over 12 hours onto the skin every 24 hours. To prevent lidocaine toxicity, patient should be patch free  for 12 hrs daily. (Patient taking differently: Place 1 patch onto the skin daily as needed for moderate pain. To prevent lidocaine toxicity, patient should be patch free for 12 hrs daily.) Past Week    losartan (COZAAR) 25 MG tablet Take 1 tablet (25 mg) by mouth 2 times daily. 2025    methocarbamol (ROBAXIN) 500 MG tablet Take 1 tablet (500 mg) by mouth every 6 hours as needed for muscle spasms. Past Week    metoprolol tartrate (LOPRESSOR) 25 MG tablet Take 2 tablets (50 mg) by mouth 2 times daily. 2025    Multiple Vitamins-Minerals (PRESERVISION AREDS 2+MULTI VIT PO) Take 1 capsule by mouth 2 times daily Taking    omeprazole (PRILOSEC) 40 MG DR capsule Take 1 capsule (40 mg) by mouth daily. Taking    potassium chloride ER (K-TAB) 20 MEQ CR tablet Take 1 tablet (20 mEq) by mouth daily Taking    psyllium (METAMUCIL/KONSYL) Packet Take 1 packet by mouth daily as needed for constipation. Past Month    rosuvastatin (CRESTOR) 20 MG tablet Take 1 tablet by mouth once daily 2025    sodium bicarbonate 650 MG tablet Take 1 tablet (650 mg) by mouth 2 times daily 2025    timolol maleate (TIMOPTIC) 0.5 % ophthalmic solution INSTILL 1 DROP INTO EACH EYE ONCE DAILY IN THE MORNING 2025    [] traMADol (ULTRAM) 50 MG tablet Take 1 tablet (50 mg) by mouth every 6 hours as needed for severe pain. 2025 at 10:00 AM    vitamin B-12 (CYANOCOBALAMIN) 500 MCG tablet Take 1 tablet by mouth daily 2025    vitamin D3 25 mcg (1000 units) tablet Take 1 tablet (25 mcg) by mouth every other day Past Week

## 2025-04-02 NOTE — PROGRESS NOTES
[id number] Shift        Patient is alert and oriented.  Patient up in chair for this 4 hr shift.   Did not void this 4 hr shift. UC still pending and passed on to oncoming staff.   Patient transfers A1 W/GB.   PRN robaxin x 1 for pain.     Vital signs this shift B/P: 116/74, T: 97.8, P: 104, R: 16   Patient is able to make needs known, uses the call light appropriately. Continue with the plan of care.

## 2025-04-03 ENCOUNTER — APPOINTMENT (OUTPATIENT)
Dept: PHYSICAL THERAPY | Facility: CLINIC | Age: 89
End: 2025-04-03
Payer: COMMERCIAL

## 2025-04-03 ENCOUNTER — APPOINTMENT (OUTPATIENT)
Dept: OCCUPATIONAL THERAPY | Facility: CLINIC | Age: 89
End: 2025-04-03
Payer: COMMERCIAL

## 2025-04-03 VITALS
DIASTOLIC BLOOD PRESSURE: 59 MMHG | BODY MASS INDEX: 22.6 KG/M2 | OXYGEN SATURATION: 98 % | SYSTOLIC BLOOD PRESSURE: 117 MMHG | RESPIRATION RATE: 18 BRPM | HEART RATE: 103 BPM | HEIGHT: 66 IN | TEMPERATURE: 97.4 F

## 2025-04-03 LAB
ANION GAP SERPL CALCULATED.3IONS-SCNC: 17 MMOL/L (ref 7–15)
BACTERIA BLD CULT: NORMAL
BACTERIA BLD CULT: NORMAL
BACTERIA SNV CULT: NORMAL
BUN SERPL-MCNC: 50.5 MG/DL (ref 8–23)
CALCIUM SERPL-MCNC: 9.1 MG/DL (ref 8.8–10.4)
CHLORIDE SERPL-SCNC: 100 MMOL/L (ref 98–107)
CREAT SERPL-MCNC: 2.68 MG/DL (ref 0.51–0.95)
CRP SERPL-MCNC: 272.29 MG/L
EGFRCR SERPLBLD CKD-EPI 2021: 17 ML/MIN/1.73M2
ERYTHROCYTE [DISTWIDTH] IN BLOOD BY AUTOMATED COUNT: 13.9 % (ref 10–15)
GLUCOSE SERPL-MCNC: 151 MG/DL (ref 70–99)
GRAM STAIN RESULT: NORMAL
GRAM STAIN RESULT: NORMAL
HCO3 SERPL-SCNC: 19 MMOL/L (ref 22–29)
HCT VFR BLD AUTO: 34.7 % (ref 35–47)
HGB BLD-MCNC: 11.1 G/DL (ref 11.7–15.7)
MCH RBC QN AUTO: 29.8 PG (ref 26.5–33)
MCHC RBC AUTO-ENTMCNC: 32 G/DL (ref 31.5–36.5)
MCV RBC AUTO: 93 FL (ref 78–100)
PLATELET # BLD AUTO: 88 10E3/UL (ref 150–450)
POTASSIUM SERPL-SCNC: 4.3 MMOL/L (ref 3.4–5.3)
RBC # BLD AUTO: 3.72 10E6/UL (ref 3.8–5.2)
SODIUM SERPL-SCNC: 136 MMOL/L (ref 135–145)
WBC # BLD AUTO: 8 10E3/UL (ref 4–11)

## 2025-04-03 PROCEDURE — 250N000012 HC RX MED GY IP 250 OP 636 PS 637: Performed by: INTERNAL MEDICINE

## 2025-04-03 PROCEDURE — 85048 AUTOMATED LEUKOCYTE COUNT: CPT | Performed by: INTERNAL MEDICINE

## 2025-04-03 PROCEDURE — 97530 THERAPEUTIC ACTIVITIES: CPT | Mod: GP | Performed by: PHYSICAL THERAPIST

## 2025-04-03 PROCEDURE — 36415 COLL VENOUS BLD VENIPUNCTURE: CPT | Performed by: INTERNAL MEDICINE

## 2025-04-03 PROCEDURE — 250N000013 HC RX MED GY IP 250 OP 250 PS 637: Performed by: INTERNAL MEDICINE

## 2025-04-03 PROCEDURE — 120N000002 HC R&B MED SURG/OB UMMC

## 2025-04-03 PROCEDURE — 97165 OT EVAL LOW COMPLEX 30 MIN: CPT | Mod: GO

## 2025-04-03 PROCEDURE — 97535 SELF CARE MNGMENT TRAINING: CPT | Mod: GO

## 2025-04-03 PROCEDURE — 250N000013 HC RX MED GY IP 250 OP 250 PS 637: Performed by: STUDENT IN AN ORGANIZED HEALTH CARE EDUCATION/TRAINING PROGRAM

## 2025-04-03 PROCEDURE — 80048 BASIC METABOLIC PNL TOTAL CA: CPT | Performed by: INTERNAL MEDICINE

## 2025-04-03 PROCEDURE — 99233 SBSQ HOSP IP/OBS HIGH 50: CPT | Performed by: INTERNAL MEDICINE

## 2025-04-03 PROCEDURE — 250N000011 HC RX IP 250 OP 636: Mod: JZ | Performed by: INTERNAL MEDICINE

## 2025-04-03 PROCEDURE — 85014 HEMATOCRIT: CPT | Performed by: INTERNAL MEDICINE

## 2025-04-03 PROCEDURE — 5A09357 ASSISTANCE WITH RESPIRATORY VENTILATION, LESS THAN 24 CONSECUTIVE HOURS, CONTINUOUS POSITIVE AIRWAY PRESSURE: ICD-10-PCS | Performed by: INTERNAL MEDICINE

## 2025-04-03 PROCEDURE — 86140 C-REACTIVE PROTEIN: CPT | Performed by: INTERNAL MEDICINE

## 2025-04-03 RX ORDER — METHYLPREDNISOLONE SODIUM SUCCINATE 125 MG/2ML
INJECTION INTRAMUSCULAR; INTRAVENOUS
Status: COMPLETED
Start: 2025-04-03 | End: 2025-04-03

## 2025-04-03 RX ADMIN — AMLODIPINE BESYLATE 5 MG: 5 TABLET ORAL at 08:26

## 2025-04-03 RX ADMIN — LOSARTAN POTASSIUM 25 MG: 25 TABLET, FILM COATED ORAL at 08:26

## 2025-04-03 RX ADMIN — Medication 100 MG: at 09:58

## 2025-04-03 RX ADMIN — HYDROMORPHONE HYDROCHLORIDE 2 MG: 2 TABLET ORAL at 18:18

## 2025-04-03 RX ADMIN — POTASSIUM CHLORIDE 20 MEQ: 750 CAPSULE, EXTENDED RELEASE ORAL at 09:58

## 2025-04-03 RX ADMIN — METOPROLOL TARTRATE 50 MG: 50 TABLET, FILM COATED ORAL at 08:26

## 2025-04-03 RX ADMIN — ACETAMINOPHEN 975 MG: 325 TABLET, FILM COATED ORAL at 20:09

## 2025-04-03 RX ADMIN — OMEPRAZOLE 40 MG: 20 CAPSULE, DELAYED RELEASE ORAL at 10:00

## 2025-04-03 RX ADMIN — ACETAMINOPHEN 975 MG: 325 TABLET, FILM COATED ORAL at 13:10

## 2025-04-03 RX ADMIN — PREDNISONE 40 MG: 20 TABLET ORAL at 10:04

## 2025-04-03 RX ADMIN — METHYLPREDNISOLONE SODIUM SUCCINATE 125 MG: 125 INJECTION, POWDER, FOR SOLUTION INTRAMUSCULAR; INTRAVENOUS at 00:35

## 2025-04-03 RX ADMIN — METOPROLOL TARTRATE 50 MG: 50 TABLET, FILM COATED ORAL at 20:09

## 2025-04-03 RX ADMIN — Medication 1 CAPSULE: at 20:09

## 2025-04-03 RX ADMIN — HYDROMORPHONE HYDROCHLORIDE 2 MG: 2 TABLET ORAL at 05:28

## 2025-04-03 RX ADMIN — CLOPIDOGREL BISULFATE 75 MG: 75 TABLET ORAL at 10:01

## 2025-04-03 RX ADMIN — TIMOLOL MALEATE 1 DROP: 5 SOLUTION/ DROPS OPHTHALMIC at 10:01

## 2025-04-03 RX ADMIN — ROSUVASTATIN 10 MG: 10 TABLET, FILM COATED ORAL at 10:01

## 2025-04-03 RX ADMIN — CYANOCOBALAMIN TAB 500 MCG 500 MCG: 500 TAB at 10:01

## 2025-04-03 RX ADMIN — FUROSEMIDE 40 MG: 20 TABLET ORAL at 10:00

## 2025-04-03 RX ADMIN — SODIUM BICARBONATE 650 MG TABLET 650 MG: at 20:10

## 2025-04-03 RX ADMIN — Medication 1 CAPSULE: at 09:59

## 2025-04-03 RX ADMIN — ISOSORBIDE MONONITRATE 90 MG: 60 TABLET, EXTENDED RELEASE ORAL at 10:00

## 2025-04-03 RX ADMIN — ACETAMINOPHEN 975 MG: 325 TABLET, FILM COATED ORAL at 05:27

## 2025-04-03 RX ADMIN — LIDOCAINE 4% 2 PATCH: 40 PATCH TOPICAL at 18:13

## 2025-04-03 RX ADMIN — METHOCARBAMOL 500 MG: 500 TABLET ORAL at 08:26

## 2025-04-03 RX ADMIN — SODIUM BICARBONATE 650 MG TABLET 650 MG: at 09:58

## 2025-04-03 RX ADMIN — HYDROMORPHONE HYDROCHLORIDE 2 MG: 2 TABLET ORAL at 08:26

## 2025-04-03 RX ADMIN — METHYLPREDNISOLONE SODIUM SUCCINATE: 125 INJECTION INTRAMUSCULAR; INTRAVENOUS at 00:45

## 2025-04-03 RX ADMIN — LOSARTAN POTASSIUM 25 MG: 25 TABLET, FILM COATED ORAL at 20:09

## 2025-04-03 ASSESSMENT — ACTIVITIES OF DAILY LIVING (ADL)
ADLS_ACUITY_SCORE: 41
DEPENDENT_IADLS:: TRANSPORTATION;CLEANING;COOKING
ADLS_ACUITY_SCORE: 41
IADL_COMMENTS: WILL HAVE ASSIST WITH IADLS
ADLS_ACUITY_SCORE: 41

## 2025-04-03 NOTE — PROGRESS NOTES
The patient was offered CPAP therapy per MD order, Patient declined, she stated the mask will make it hard to fall asleep and stay asleep.

## 2025-04-03 NOTE — PLAN OF CARE
"      VS: /81   Pulse 105   Temp 97.8  F (36.6  C) (Oral)   Resp 16   Ht 1.676 m (5' 6\")   SpO2 97%   BMI 22.60 kg/m      O2: 97% on RA, denies SOB and chest pain   Output: Up to commode twice during shift, normal output   Last BM: 3/28/2025   Activity: Ax1 with walker and gait belt, up to chair, repositions independently in chair   Skin: Extensive bruising to let side of body and face from fall     Pain: Reports majority of pain in her right knee which radiates to her ankle, pain also reported in back and hips, pain managed with PRN's, attempted cold therapy on knee   CMS: AO x4, denies numbness and tingling    Dressing: None   Diet: Regular Diet   LDA: L PIV intact, purewick at night    Equipment: call light, walker, GB and personal belongings    Plan: Continue POC, educate on diet alterations   Additional Info: Standing does not increase pain, gait appeared steady, tachycardic     "

## 2025-04-03 NOTE — PLAN OF CARE
Goal Outcome Evaluation:      Plan of Care Reviewed With: patient    Overall Patient Progress: no changeOverall Patient Progress: no change       VS: Temp: 98.3  F (36.8  C) Temp src: Axillary BP: 119/85 Pulse: 105   Resp: 17 SpO2: 97 % O2 Device: None (Room air)     O2: Room air, denies SOB and chest pain    Output: Continent of B/B   Last BM:    Activity: Ax1 GB, walker   Skin: Scattered bruises to face, BLE, BUE   Pain: Managed with scheduled meds   CMS: A&Ox4, denies N/T   Dressing: None   Diet: Regular   LDA:    Equipment: Personal belongings, walker, GB, IV pole   Plan: Pending pain control and PT/OT progress   Additional Info:

## 2025-04-03 NOTE — PROGRESS NOTES
[facility]    Medicine Progress Note - Hospitalist Service, GOLD TEAM 16    Date of Admission:  4/1/2025    Assessment & Plan   89 year old female admitted on 4/1/2025. She has a history of HTN, CKD4, CAD s/p PCI, Afib s/p Watchman (8/2024) on Plavix, recurrent GIB, h/o DVT in 2010. She is admitted for severe worsening pain, particularly in the right knee, back, hip, and foot c/f trauma vs infectious/septic vs inflammatory (gout, pseudogout) cause of pain. She requires admission for pain management, PT/OT assessment, and further evaluation and management.      Right knee pain and effusion, inability to bear weight c/f septic joint  Leukocytosis + elevated inflammatory markers  Recent fall with significant trauma + generalized weakness  History of osteoporosis and osteoarthritis  Degenerative disc disease + neural foraminal stenosis at L4-L5, L5-S1  Synovial analysis notable for calcium pyrophospate crystals. Acute pseudogout involving multiple joints.   -ABX on hold.   -Patient received IV Solumedrol. Knee pain improving. Continue monitoring.   -Patient waiting for TCU.     Hypertension, poorly controlled + Hyperlipidemia  CAD s/p PCI (multiple stents, most recent 2013)  HFpEF (60-65%), compensated  Afib s/p Watchman only on Plavix  History of complete heart block s/p PPM placement (2007)  History of DVT with infrarenal IVC filter  Patient with significant heart history including multiple episodes of stenting, cardiac pacemaker placement due to history of complete heart block, and Afib with Watchman placement due to inability to tolerate long-term AC, now only on Plavix. Recent MICHELL in 10/2024 showed LVEF of 60-65%, moderate mitral and tricuspid valve regurgitation. On arrival to the ED, patient with significant hypertension and SBPs in the 200s, Afib with HR in the 160s (RVR) but asymptomatic including no chest pain, shortness of breath, palpitations. She was given  diltiazem IV and then her home medications with improvement in rates and BP (also likely some component of pain control with improvement).   - Continue PTA meds: Amlodipine 5 mg daily, Imdur 90 mg daily, Losartan 25 mg BID, metoprolol tartrate 50 mg BID, rosuvastatin  - Continue Plavix, does not appear to be on ASA currently (was d/c'd 11/2024 per Cards)  - Continue PTA Lasix PO for now as Cr seems at baseline (hold if plan for procedure)  - Trend troponin to peak (suspect demand as no chest pain); repeat EKG with clinical change  - Telemetry at least 24-48 hours given Afib with RVR     CKD Stage 4  History of hypokalemia  Secondary renal hyperparathyroidism  At baseline creatinine on arrival to the ED 1.85, with baseline anywhere from 1.65-2.25 over the past couple years.   - Continue PTA vitamins, sodium bicarb  - Continue monitoring renal function and lytes.      Chronic conditions:  Normocytic anemia of chronic disease, chronic thrombocytopenia: Continue PTA vitamin B12 and vitamin D3; follow-up outpatient (has received BREANNA and venofer in the past)  Gout: PTA allopurinol  GERD: PTA omeprazole  KEVYN: CPAP at night/while asleep; continuous pulse ox  Asthma/COPD: Continue PTA Advair          Diet: Combination Diet Regular Diet Adult    DVT Prophylaxis: Pneumatic Compression Devices and Anti-embolisim stockings (TEDs)  Bonner Catheter: Not present  Lines: None     Cardiac Monitoring: None  Code Status: Full Code      Clinically Significant Risk Factors                # Coagulation Defect: INR = 1.27 (Ref range: 0.85 - 1.15) and/or PTT = N/A, will monitor for bleeding  # Thrombocytopenia: Lowest platelets = 85 in last 2 days, will monitor for bleeding  # Acute Kidney Injury, unspecified: based on a >150% or 0.3 mg/dL increase in last creatinine compared to past 90 day average, will monitor renal function  # Hypertension: Noted on problem list  # Chronic heart failure with preserved ejection fraction: heart failure  noted on problem list and last echo with EF >50%               # Financial/Environmental Concerns:     # Pacemaker present       Social Drivers of Health    Housing Stability: High Risk (4/2/2025)    Housing Stability     Do you have housing? : No     Are you worried about losing your housing?: No   Tobacco Use: Medium Risk (2/17/2025)    Patient History     Smoking Tobacco Use: Former     Smokeless Tobacco Use: Never          Disposition Plan     Medically Ready for Discharge: Anticipated in 2-4 Days             Gabino Chaparro MD  Hospitalist Service, GOLD TEAM 16  [facility]  Securely message with [employer] (more info)  Text page via [facility] Paging/Directory   See signed in provider for up to date coverage information  ______________________________________________________________________    Interval History     No acute events overnight.   Right knee pain.   Other ROS negative.     Physical Exam   Vital Signs: Temp: 97.7  F (36.5  C) Temp src: Axillary BP: 125/80 Pulse: 105   Resp: 14   O2 Device: None (Room air)    Weight: 0 lbs 0 oz    General Appearance:  Alert, room air, no acute distress.   HEENT: PERRL, periorbital hematoma and ecchymoses around the left eye, dry MM  Respiratory: CTAB, no increased work of breathing  Cardiovascular: Irregularly irregular, tachycardic, no obvious murmur, WWP  GI: Soft, non-tender, non-distended, active BS  Skin: Scattered ecchymoses on several extremities, face, etc.   MSK: Moving all extremities; significant swelling of the right knee with associated heat but no significant erythema; bruising of the left knee  Neuro: Alert, oriented, pleasant.     Medical Decision Making       55 MINUTES SPENT BY ME on the date of service doing chart review, history, exam, documentation & further activities per the note.      Data     I have personally reviewed the following data over the past 24 hrs:    8.0  \   11.1 (L)   / 88 (L)     136 100  50.5 (H) /  151 (H)   4.3 19 (L) 2.68 (H) \     Procal: N/A CRP: 272.29 (H) Lactic Acid: N/A         Imaging results reviewed over the past 24 hrs:   No results found for this or any previous visit (from the past 24 hours).

## 2025-04-03 NOTE — PROGRESS NOTES
04/03/25 1500   Appointment Info   Signing Clinician's Name / Credentials (OT) Araceli Barrios MA OTR/L   Living Environment   People in Home spouse   Current Living Arrangements house   Home Accessibility stairs to enter home;stairs within home   Transportation Anticipated family or friend will provide   Living Environment Comments Has tall toilet with grab bars, both WIS and tub/shower with shower chair.  can't physically assist much.   Self-Care   Usual Activity Tolerance good   Current Activity Tolerance moderate   Equipment Currently Used at Home none  (has shower chair)   Fall history within last six months yes   Number of times patient has fallen within last six months 2   Instrumental Activities of Daily Living (IADL)   IADL Comments will have assist with IADLs   General Information   Onset of Illness/Injury or Date of Surgery 04/02/25   Referring Physician Severson, Hayley, MD   Patient/Family Therapy Goal Statement (OT) To go to TCU to get stronger   Additional Occupational Profile Info/Pertinent History of Current Problem 89 year old female admitted on 4/1/2025. She has a history of HTN, CKD4, CAD s/p PCI, Afib s/p Watchman (8/2024) on Plavix, recurrent GIB, h/o DVT in 2010. She is admitted for severe worsening pain, particularly in the right knee, back, hip, and foot c/f trauma vs infectious/septic vs inflammatory (gout, pseudogout) cause of pain. She requires admission for pain management, PT/OT assessment, and further evaluation and management.   Existing Precautions/Restrictions fall   Cognitive Status Examination   Orientation Status orientation to person, place and time   Visual Perception   Visual Impairment/Limitations corrective lenses full-time   Sensory   Sensory Quick Adds sensation intact   Pain Assessment   Patient Currently in Pain Yes, see Vital Sign flowsheet  (much improved this pm)   Range of Motion Comprehensive   Comment, General Range of Motion B UE's WFL   Strength  Comprehensive (MMT)   Comment, General Manual Muscle Testing (MMT) Assessment B UE's WFL   Coordination   Upper Extremity Coordination No deficits were identified   Bed Mobility   Comment (Bed Mobility) NA during eval   Sit-Stand Transfer   Sit-Stand Criders (Transfers) contact guard;set up;verbal cues   Assistive Device (Sit-Stand Transfers) walker, front-wheeled   Lower Body Dressing Assessment/Training   Criders Level (Lower Body Dressing) minimum assist (75% patient effort)   Grooming Assessment/Training   Criders Level (Grooming) contact guard assist   Toileting   Criders Level (Toileting) contact guard assist   Clinical Impression   Criteria for Skilled Therapeutic Interventions Met (OT) Yes, treatment indicated   OT Diagnosis Decreased functional mobility and ADLs   OT Problem List-Impairments impacting ADL problems related to;activity tolerance impaired;pain;strength   Assessment of Occupational Performance 1-3 Performance Deficits   Identified Performance Deficits dressing,bathing, toileting   Planned Therapy Interventions (OT) ADL retraining;transfer training   Clinical Decision Making Complexity (OT) problem focused assessment/low complexity   Risk & Benefits of therapy have been explained evaluation/treatment results reviewed;care plan/treatment goals reviewed;patient   OT Total Evaluation Time   OT Eval, Low Complexity Minutes (35236) 6   OT Goals   Therapy Frequency (OT) 5 times/week   OT Predicted Duration/Target Date for Goal Attainment 04/10/25   OT Goals Hygiene/Grooming;Lower Body Dressing;Toilet Transfer/Toileting   OT: Hygiene/Grooming supervision/stand-by assist;while standing   OT: Lower Body Dressing Supervision/stand-by assist   OT: Toilet Transfer/Toileting Supervision/stand-by assist;toilet transfer;cleaning and garment management;using adaptive equipment   OT Discharge Planning   OT Plan OT: g/h standing at sink, LE dressing   OT Discharge Recommendation (DC Rec)  Transitional Care Facility   OT Rationale for DC Rec Pt is below baseline and  cannot physically assist at home, recommend TCU to maximize safety and IND with functional mobility and ADLs   OT Brief overview of current status Ax1 using FWW   OT Total Distance Amb During Session (feet) 15   Total Session Time   Total Session Time (sum of timed and untimed services) 6

## 2025-04-03 NOTE — CONSULTS
Care Management Initial Consult    General Information  Assessment completed with: Patient,    Type of CM/SW Visit: Initial Assessment    Primary Care Provider verified and updated as needed: Yes   Readmission within the last 30 days: no previous admission in last 30 days      Reason for Consult: discharge planning  Advance Care Planning: Advance Care Planning Reviewed: no concerns identified          Communication Assessment  Patient's communication style: spoken language (English or Bilingual)    Hearing Difficulty or Deaf: yes   Wear Glasses or Blind: yes    Cognitive  Cognitive/Neuro/Behavioral: WDL                      Living Environment:   People in home: spouse     Current living Arrangements: house      Able to return to prior arrangements: no       Family/Social Support:  Care provided by: self  Provides care for: no one  Marital Status:   Support system:   [place]       Description of Support System: Supportive, Involved    Support Assessment: Adequate family and caregiver support, Adequate social supports    Current Resources:   Patient receiving home care services: No        Community Resources:    Equipment currently used at home: none  Supplies currently used at home: None    Employment/Financial:  Employment Status: retired        Financial Concerns:             Does the patient's insurance plan have a 3 day qualifying hospital stay waiver?  No    Lifestyle & Psychosocial Needs:  Social Drivers of Health     Food Insecurity: Low Risk  (4/2/2025)    Food Insecurity     Within the past 12 months, did you worry that your food would run out before you got money to buy more?: No     Within the past 12 months, did the food you bought just not last and you didn t have money to get more?: No   Depression: Not at risk (2/7/2025)    PHQ-2     PHQ-2 Score: 0   Housing Stability: High Risk (4/2/2025)    Housing Stability     Do you have housing? : No     Are you worried about losing your housing?: No    Tobacco Use: Medium Risk (2/17/2025)    Patient History     Smoking Tobacco Use: Former     Smokeless Tobacco Use: Never     Passive Exposure: Not on file   Financial Resource Strain: Low Risk  (4/2/2025)    Financial Resource Strain     Within the past 12 months, have you or your family members you live with been unable to get utilities (heat, electricity) when it was really needed?: No   Alcohol Use: Not on file   Transportation Needs: Low Risk  (4/2/2025)    Transportation Needs     Within the past 12 months, has lack of transportation kept you from medical appointments, getting your medicines, non-medical meetings or appointments, work, or from getting things that you need?: No   Physical Activity: Not on file   Interpersonal Safety: Low Risk  (4/2/2025)    Interpersonal Safety     Do you feel physically and emotionally safe where you currently live?: Yes     Within the past 12 months, have you been hit, slapped, kicked or otherwise physically hurt by someone?: No     Within the past 12 months, have you been humiliated or emotionally abused in other ways by your partner or ex-partner?: No   Stress: Not on file   Social Connections: Not on file   Health Literacy: Not on file       Functional Status:  Prior to admission patient needed assistance:   Dependent ADLs:: Independent, Ambulation-walker  Dependent IADLs:: Transportation, Cleaning, Cooking  Assesssment of Functional Status: Not at baseline with ADL Functioning, Not at baseline with mobility    Mental Health Status:  Mental Health Status: No Current Concerns       Chemical Dependency Status:  Chemical Dependency Status: No Current Concerns             Values/Beliefs:  Spiritual, Cultural Beliefs, Gnosticism Practices, Values that affect care: yes          Values/Beliefs Comment: Orthodox    Discussed  Partnership in Safe Discharge Planning  document with patient/family: No    Additional Information:  Tessa Mansfield is a 89 year old female admitted on  4/1/2025. She has a history of HTN, CKD4, CAD s/p PCI, Afib s/p Watchman (8/2024) on Plavix, recurrent GIB, h/o DVT in 2010. She is admitted for severe worsening pain, particularly in the right knee, back, hip, and foot c/f trauma vs infectious/septic vs inflammatory (gout, pseudogout) cause of pain. She requires admission for pain management, PT/OT assessment, and further evaluation and management. Copied from Dr. Decker progress note from 4/2/2025.     Met with patient at bedside to complete CMA and discuss discharge options. Therapy is recommending TCU and patient is agreeable with this discharge plan. Patient has been to TCU in the past and has a couple of places she would like referrals sent. Patient was also given a list of TCU's. Patient states her  will be visiting this afternoon and they will look at the TCU list and pick additional choices if needed for referrals. RNCC will continue to follow for TCU placement.    Pending Referrals:    [facility]  [address] [place] [address]  Phone: [phone]  Fax: [phone]    [facility]  [address] [place] [address]  Phone: [phone]  Fax: [phone]    Update 1410: Patient has been accepted to [facility] for admit on 4/4/2025. Wheelchair transport to be set up prior to discharge. Waiting to hear from facility what time they would like patient. Discussed $47.00 a day private room fee. Patient was agreeable with this fee. RNCC will continue to follow for discharge planning.  [id number]     [facility] wheelchair transport set up for 4/4/2025 with a  window of [phone]. [facility] to provider wheelchair.     Next Steps:   Discharge    Nuzhat Bajwa RN, BSN  Care Coordinator, 5 Ortho  Phone [phone]

## 2025-04-03 NOTE — PROGRESS NOTES
CHW asked by RNCC to complete Senior Linkage Line Preadmission Screening (PAS) for patient. CHW completed PAS, confirmation number: [id number].                Hilary Mcnair  Inpatient CHW  [facility] 5 Ortho/8A Med-Surg/10-ICU/WB-ED  [phone]

## 2025-04-04 ENCOUNTER — APPOINTMENT (OUTPATIENT)
Dept: PHYSICAL THERAPY | Facility: CLINIC | Age: 89
End: 2025-04-04
Payer: COMMERCIAL

## 2025-04-04 LAB
ANION GAP SERPL CALCULATED.3IONS-SCNC: 18 MMOL/L (ref 7–15)
BUN SERPL-MCNC: 71.3 MG/DL (ref 8–23)
BURR CELLS BLD QL SMEAR: SLIGHT
CALCIUM SERPL-MCNC: 8.5 MG/DL (ref 8.8–10.4)
CHLORIDE SERPL-SCNC: 99 MMOL/L (ref 98–107)
CREAT SERPL-MCNC: 3.35 MG/DL (ref 0.51–0.95)
CRP SERPL-MCNC: 125.91 MG/L
EGFRCR SERPLBLD CKD-EPI 2021: 13 ML/MIN/1.73M2
ELLIPTOCYTES BLD QL SMEAR: SLIGHT
ERYTHROCYTE [DISTWIDTH] IN BLOOD BY AUTOMATED COUNT: 14.1 % (ref 10–15)
GIANT PLATELETS BLD QL SMEAR: ABNORMAL
GLUCOSE SERPL-MCNC: 143 MG/DL (ref 70–99)
HCO3 SERPL-SCNC: 19 MMOL/L (ref 22–29)
HCT VFR BLD AUTO: 30.8 % (ref 35–47)
HGB BLD-MCNC: 9.8 G/DL (ref 11.7–15.7)
MCH RBC QN AUTO: 29.9 PG (ref 26.5–33)
MCHC RBC AUTO-ENTMCNC: 31.8 G/DL (ref 31.5–36.5)
MCV RBC AUTO: 94 FL (ref 78–100)
PLAT MORPH BLD: ABNORMAL
PLATELET # BLD AUTO: 149 10E3/UL (ref 150–450)
POTASSIUM SERPL-SCNC: 4.5 MMOL/L (ref 3.4–5.3)
RBC # BLD AUTO: 3.28 10E6/UL (ref 3.8–5.2)
RBC MORPH BLD: ABNORMAL
SODIUM SERPL-SCNC: 136 MMOL/L (ref 135–145)
WBC # BLD AUTO: 8 10E3/UL (ref 4–11)

## 2025-04-04 PROCEDURE — 120N000002 HC R&B MED SURG/OB UMMC

## 2025-04-04 PROCEDURE — 85018 HEMOGLOBIN: CPT | Performed by: INTERNAL MEDICINE

## 2025-04-04 PROCEDURE — 80048 BASIC METABOLIC PNL TOTAL CA: CPT | Performed by: INTERNAL MEDICINE

## 2025-04-04 PROCEDURE — 258N000003 HC RX IP 258 OP 636: Performed by: INTERNAL MEDICINE

## 2025-04-04 PROCEDURE — 250N000009 HC RX 250: Performed by: STUDENT IN AN ORGANIZED HEALTH CARE EDUCATION/TRAINING PROGRAM

## 2025-04-04 PROCEDURE — 99232 SBSQ HOSP IP/OBS MODERATE 35: CPT | Performed by: INTERNAL MEDICINE

## 2025-04-04 PROCEDURE — 97530 THERAPEUTIC ACTIVITIES: CPT | Mod: GP | Performed by: PHYSICAL THERAPIST

## 2025-04-04 PROCEDURE — 250N000013 HC RX MED GY IP 250 OP 250 PS 637: Performed by: STUDENT IN AN ORGANIZED HEALTH CARE EDUCATION/TRAINING PROGRAM

## 2025-04-04 PROCEDURE — 250N000013 HC RX MED GY IP 250 OP 250 PS 637: Performed by: INTERNAL MEDICINE

## 2025-04-04 PROCEDURE — 85041 AUTOMATED RBC COUNT: CPT | Performed by: INTERNAL MEDICINE

## 2025-04-04 PROCEDURE — 999N000127 HC STATISTIC PERIPHERAL IV START W US GUIDANCE

## 2025-04-04 PROCEDURE — 250N000012 HC RX MED GY IP 250 OP 636 PS 637: Performed by: INTERNAL MEDICINE

## 2025-04-04 PROCEDURE — 36415 COLL VENOUS BLD VENIPUNCTURE: CPT | Performed by: INTERNAL MEDICINE

## 2025-04-04 PROCEDURE — 86140 C-REACTIVE PROTEIN: CPT | Performed by: INTERNAL MEDICINE

## 2025-04-04 PROCEDURE — 82374 ASSAY BLOOD CARBON DIOXIDE: CPT | Performed by: INTERNAL MEDICINE

## 2025-04-04 PROCEDURE — 97110 THERAPEUTIC EXERCISES: CPT | Mod: GP | Performed by: PHYSICAL THERAPIST

## 2025-04-04 RX ORDER — PREDNISONE 10 MG/1
30 TABLET ORAL DAILY
Qty: 6 TABLET | Refills: 0 | Status: SHIPPED | OUTPATIENT
Start: 2025-04-05 | End: 2025-04-09

## 2025-04-04 RX ORDER — PREDNISONE 10 MG/1
10 TABLET ORAL DAILY
Qty: 2 TABLET | Refills: 0 | Status: SHIPPED | OUTPATIENT
Start: 2025-04-09 | End: 2025-04-11

## 2025-04-04 RX ORDER — PREDNISONE 20 MG/1
20 TABLET ORAL DAILY
Qty: 2 TABLET | Refills: 0 | Status: SHIPPED | OUTPATIENT
Start: 2025-04-07 | End: 2025-04-09

## 2025-04-04 RX ORDER — SODIUM CHLORIDE 9 MG/ML
INJECTION, SOLUTION INTRAVENOUS CONTINUOUS
Status: DISCONTINUED | OUTPATIENT
Start: 2025-04-04 | End: 2025-04-05

## 2025-04-04 RX ORDER — PREDNISONE 10 MG/1
10 TABLET ORAL DAILY
Status: DISCONTINUED | OUTPATIENT
Start: 2025-04-09 | End: 2025-04-09 | Stop reason: HOSPADM

## 2025-04-04 RX ORDER — PREDNISONE 20 MG/1
20 TABLET ORAL DAILY
Status: COMPLETED | OUTPATIENT
Start: 2025-04-07 | End: 2025-04-08

## 2025-04-04 RX ADMIN — ROSUVASTATIN 10 MG: 10 TABLET, FILM COATED ORAL at 08:00

## 2025-04-04 RX ADMIN — LOSARTAN POTASSIUM 25 MG: 25 TABLET, FILM COATED ORAL at 07:59

## 2025-04-04 RX ADMIN — Medication 1 CAPSULE: at 07:57

## 2025-04-04 RX ADMIN — TIMOLOL MALEATE 1 DROP: 5 SOLUTION/ DROPS OPHTHALMIC at 11:30

## 2025-04-04 RX ADMIN — OMEPRAZOLE 40 MG: 20 CAPSULE, DELAYED RELEASE ORAL at 07:59

## 2025-04-04 RX ADMIN — SODIUM BICARBONATE 650 MG TABLET 650 MG: at 20:56

## 2025-04-04 RX ADMIN — METOPROLOL TARTRATE 50 MG: 50 TABLET, FILM COATED ORAL at 08:00

## 2025-04-04 RX ADMIN — SODIUM CHLORIDE: 0.9 INJECTION, SOLUTION INTRAVENOUS at 22:20

## 2025-04-04 RX ADMIN — Medication 100 MG: at 07:59

## 2025-04-04 RX ADMIN — SODIUM BICARBONATE 650 MG TABLET 650 MG: at 08:00

## 2025-04-04 RX ADMIN — HYDROMORPHONE HYDROCHLORIDE 2 MG: 2 TABLET ORAL at 02:58

## 2025-04-04 RX ADMIN — Medication 1 CAPSULE: at 20:56

## 2025-04-04 RX ADMIN — SODIUM CHLORIDE: 0.9 INJECTION, SOLUTION INTRAVENOUS at 11:37

## 2025-04-04 RX ADMIN — ACETAMINOPHEN 975 MG: 325 TABLET, FILM COATED ORAL at 20:56

## 2025-04-04 RX ADMIN — POTASSIUM CHLORIDE 20 MEQ: 750 CAPSULE, EXTENDED RELEASE ORAL at 07:59

## 2025-04-04 RX ADMIN — Medication 25 MCG: at 11:30

## 2025-04-04 RX ADMIN — LOSARTAN POTASSIUM 25 MG: 25 TABLET, FILM COATED ORAL at 20:56

## 2025-04-04 RX ADMIN — METHOCARBAMOL 500 MG: 500 TABLET ORAL at 20:58

## 2025-04-04 RX ADMIN — ACETAMINOPHEN 975 MG: 325 TABLET, FILM COATED ORAL at 04:56

## 2025-04-04 RX ADMIN — METOPROLOL TARTRATE 50 MG: 50 TABLET, FILM COATED ORAL at 20:56

## 2025-04-04 RX ADMIN — FUROSEMIDE 40 MG: 20 TABLET ORAL at 07:59

## 2025-04-04 RX ADMIN — CLOPIDOGREL BISULFATE 75 MG: 75 TABLET ORAL at 08:00

## 2025-04-04 RX ADMIN — AMLODIPINE BESYLATE 5 MG: 5 TABLET ORAL at 07:59

## 2025-04-04 RX ADMIN — ACETAMINOPHEN 975 MG: 325 TABLET, FILM COATED ORAL at 14:19

## 2025-04-04 RX ADMIN — ISOSORBIDE MONONITRATE 90 MG: 60 TABLET, EXTENDED RELEASE ORAL at 08:00

## 2025-04-04 RX ADMIN — PREDNISONE 40 MG: 20 TABLET ORAL at 07:57

## 2025-04-04 RX ADMIN — CYANOCOBALAMIN TAB 500 MCG 500 MCG: 500 TAB at 08:00

## 2025-04-04 RX ADMIN — SODIUM CHLORIDE: 0.9 INJECTION, SOLUTION INTRAVENOUS at 14:17

## 2025-04-04 ASSESSMENT — ACTIVITIES OF DAILY LIVING (ADL)
ADLS_ACUITY_SCORE: 40
ADLS_ACUITY_SCORE: 41
ADLS_ACUITY_SCORE: 40
ADLS_ACUITY_SCORE: 41

## 2025-04-04 NOTE — PROGRESS NOTES
[facility]    Medicine Progress Note - Hospitalist Service, GOLD TEAM 16    Date of Admission:  4/1/2025    Assessment & Plan   89 year old female admitted on 4/1/2025. She has a history of HTN, CKD4, CAD s/p PCI, Afib s/p Watchman (8/2024) on Plavix, recurrent GIB, h/o DVT in 2010. She is admitted for severe worsening pain, particularly in the right knee, back, hip, and foot c/f trauma vs infectious/septic vs inflammatory cause of pain. She requires admission for pain management, PT/OT assessment, and further evaluation and management. Patient had a right knee arthrocentesis that showed calcium pyrophospate crystals. Cultures remained negative. Patient was started on steroids and she had a significant improvement.   PT / OT recommended TCU.   On 4/3 patient had concentrated urine and poor intake, I had an extensive conversation with the patient and encouraged her to increase PO intake.  On 4/4 patient was found with SHAJI on CDK, Cr 3.3, considered to be prerenal secondary to poor po intake. Discharge to TCU was cancelled for today and she was started on IVF's.        Right knee pain and effusion, inability to bear weight c/f septic joint  Leukocytosis + elevated inflammatory markers  Recent fall with significant trauma + generalized weakness  History of osteoporosis and osteoarthritis  Degenerative disc disease + neural foraminal stenosis at L4-L5, L5-S1  Synovial analysis notable for calcium pyrophospate crystals. Acute pseudogout involving multiple joints.   -Stop ABX. Cultures remain no growth.   -Continue on Prednisone taper.   -CRP trending down.   -Leucocytosis resolved.   -Patient waiting for TCU.     Hypertension, poorly controlled + Hyperlipidemia  CAD s/p PCI (multiple stents, most recent 2013)  HFpEF (60-65%), compensated  Afib s/p Watchman only on Plavix  History of complete heart block s/p PPM placement (2007)  History of DVT with infrarenal IVC  filter  Patient with significant heart history including multiple episodes of stenting, cardiac pacemaker placement due to history of complete heart block, and Afib with Watchman placement due to inability to tolerate long-term AC, now only on Plavix. Recent MICHELL in 10/2024 showed LVEF of 60-65%, moderate mitral and tricuspid valve regurgitation. On arrival to the ED, patient with significant hypertension and SBPs in the 200s, Afib with HR in the 160s (RVR) but asymptomatic including no chest pain, shortness of breath, palpitations. She was given diltiazem IV and then her home medications with improvement in rates and BP (also likely some component of pain control with improvement).   - Continue PTA meds: Amlodipine 5 mg daily, Imdur 90 mg daily, Losartan 25 mg BID, metoprolol tartrate 50 mg BID, rosuvastatin  - Continue Plavix, does not appear to be on ASA currently (was d/c'd 11/2024 per Cards)  - Hold PTA Lasix for now      SHAJI on CKD Stage 4  History of hypokalemia  Secondary renal hyperparathyroidism  - Considered to be prerenal. Start IVF's. Continue monitoring renal function and lytes. Nephrology consult tomorrow if no improvement.      Chronic conditions:  Normocytic anemia of chronic disease, chronic thrombocytopenia: Continue PTA vitamin B12 and vitamin D3; follow-up outpatient (has received BREANNA and venofer in the past)  Gout: PTA allopurinol  GERD: PTA omeprazole  KEVYN: CPAP at night/while asleep; continuous pulse ox  Asthma/COPD: Continue PTA Advair          Diet: Combination Diet Regular Diet Adult  Diet    DVT Prophylaxis: Pneumatic Compression Devices and Anti-embolisim stockings (TEDs)  Bonner Catheter: Not present  Lines: None     Cardiac Monitoring: None  Code Status: Full Code      Clinically Significant Risk Factors                 # Thrombocytopenia: Lowest platelets = 88 in last 2 days, will monitor for bleeding  # Acute Kidney Injury, unspecified: based on a >150% or 0.3 mg/dL increase in last  creatinine compared to past 90 day average, will monitor renal function  # Hypertension: Noted on problem list  # Chronic heart failure with preserved ejection fraction: heart failure noted on problem list and last echo with EF >50%               # Financial/Environmental Concerns:     # Pacemaker present       Social Drivers of Health    Housing Stability: High Risk (4/2/2025)    Housing Stability     Do you have housing? : No     Are you worried about losing your housing?: No   Tobacco Use: Medium Risk (2/17/2025)    Patient History     Smoking Tobacco Use: Former     Smokeless Tobacco Use: Never          Disposition Plan     Medically Ready for Discharge: Anticipated in 2-4 Days             Gabino Chaparro MD  Hospitalist Service, GOLD TEAM 16  [facility]  Securely message with [employer] (more info)  Text page via [employer] Paging/Directory   See signed in provider for up to date coverage information  ______________________________________________________________________    Interval History     Patient was pleasant.   Acute events as above.   Other ROS negative.       Physical Exam   Vital Signs: Temp: 97.8  F (36.6  C) Temp src: Oral BP: 106/84 Pulse: 107   Resp: 16 SpO2: 97 % O2 Device: None (Room air)    Weight: 0 lbs 0 oz    General Appearance:  Alert, room air, no acute distress.   HEENT: PERRL, periorbital hematoma and ecchymoses around the left eye, dry MM  Respiratory: CTAB, no increased work of breathing  Cardiovascular: Irregularly irregular, tachycardic, no obvious murmur  GI: Soft, non-tender, non-distended, active BS  Skin: Scattered ecchymoses on several extremities, face, etc.   MSK: Moving all extremities; significant swelling of the right knee with associated heat but no significant erythema; bruising of the left knee  Neuro: Alert, oriented, pleasant. Moving all extremities.     Medical Decision Making       55 MINUTES SPENT BY ME on the date of  service doing chart review, history, exam, documentation & further activities per the note.      Data     I have personally reviewed the following data over the past 24 hrs:    8.0  \   9.8 (L)   / 149 (L)     136 99 71.3 (H) /  143 (H)   4.5 19 (L) 3.35 (H) \     Procal: N/A CRP: 125.91 (H) Lactic Acid: N/A         Imaging results reviewed over the past 24 hrs:   No results found for this or any previous visit (from the past 24 hours).

## 2025-04-04 NOTE — PLAN OF CARE
[id number]    Goal Outcome Evaluation:      Plan of Care Reviewed With: patient    Overall Patient Progress: no changeOverall Patient Progress: no change    Pt is alert and oriented. Regular diet. VSS on room air. CPAP at night. Continent of bowel and bladder. Assist of 1/ walker. Left PIV Saline locked. Pain managed by scheduled tylenol. Denies chest pain or shortness of breath. Bed alarm on for safety. Call light within reach. Continue with POC

## 2025-04-04 NOTE — PROGRESS NOTES
Care Management Discharge Note    Discharge Date: 04/04/2025       Discharge Disposition: Skilled Nursing Facility    [facility]  [address] [place] [address]  Phone: [phone]  Fax: [phone]    Discharge Services: Transportation Services.   Discharge DME: None    Discharge Transportation:   [facility] wheelchair transport set up for 4/4/2025 with a  window of [phone]. [facility] to provider wheelchair.     Private pay costs discussed: Not applicable    Does the patient's insurance plan have a 3 day qualifying hospital stay waiver?  No    PAS Confirmation Code: [id number]  Patient/family educated on Medicare website which has current facility and service quality ratings: yes    Education Provided on the Discharge Plan: Yes  Persons Notified of Discharge Plans: patient  Patient/Family in Agreement with the Plan: yes    Handoff Referral Completed: Yes, [facility]: Internal handoff referral completed    Additional Information:  Patient has been accepted to [facility] TCU. Patient has accepted the bed and the $47.00 a day private room fee. Transportation set up through [facility] wheelchair transport set up for 4/4/2025 with a  window of [phone]. [facility] to provider wheelchair. All orders and narcotic script faxed to [facility].     Update 1229: Discharge cancelled for today due to patient's elevated Cr. Voicemail left for admissions by CYDNEY. RNCC sent an In Basket message to [facility] Admission. Patient may be ready to discharge over the weekend. Waiting to hear back from [facility] if they accept admits on the weekend. RNCC will continue to follow for discharge placement.    Update 1529: [facility] does not take patient's over the weekend. Will plan for a 4/7/2025 discharge to [facility] if patient is medically ready. RNCC will continue to follow.    Nuzhat Bajwa RN, BSN  Care Coordinator, 5 Ortho  Phone [phone]

## 2025-04-04 NOTE — CONSULTS
"Consult received for Vascular access care.  See LDA for details. For additional needs place \"Nursing to Consult for Vascular Access\" [id number] order in EPIC.  "

## 2025-04-04 NOTE — PROGRESS NOTES
Brief SW Note:    1111: CYDNEY received update from charge nurse that Dr. Chaparro wants to cancel discharge d/t elevated creatinine. RNCC coordinated pt's discharge, but currently away from desk in a meeting. CYDNEY requested assistance from CHW to cancel discharge ride with [employer].     1115: CYDNEY left VM with Jamila in Admissions at [facility] (PH: [phone]), provided update on the above information and apologized for the short notice; requested a call back to SW or RNCC to discuss the potential of a weekend admission.    CYDNEY updated RNCC of all of the above information.        PRATIMA Queen, LSW  5 Ortho   PHONE: [phone]    CYDNEY Coverage SEARCHABLE in [employer] search 5 Ortho SW  (or by name)  Or click on link below:  5 Ortho [place]

## 2025-04-04 NOTE — PLAN OF CARE
Goal Outcome Evaluation:      Plan of Care Reviewed With: patient    Overall Patient Progress: no changeOverall Patient Progress: no change       VS: Temp: 97.4  F (36.3  C) Temp src: Oral BP: 117/59 Pulse: 103   Resp: 18 SpO2: 98 % O2 Device: None (Room air)     O2: Room air, denies SOB and chest pain    Output: Continent of B/B   Last BM: 4/4   Activity: Ax1 walker, GB   Skin: Scattered bruising, stitches to L hand    Pain: Managed with PRN oral dilaudid   CMS: A&Ox4, numbness to L hand    Dressing: None    Diet: Regular    LDA: L PIV SL    Equipment: Personal belongings, GB, walker   Plan: Continue plan of care   Additional Info:

## 2025-04-04 NOTE — PROGRESS NOTES
CHW was delegated to cancel right due to medical reasons by doctor.                        Hilary Mcnair  Inpatient CHW  [facility] 5 Ortho/8A Med-Surg/10-ICU/WB-ED  [phone]

## 2025-04-04 NOTE — PROGRESS NOTES
Patient is alert and oriented.   Patient is on room air.  Patient continent of bowel and bladder.   Patient has L-PIV.    Vital signs this shift B/P: 100/73, T: 97.4, P: 121, R: 18   Patient is able to make needs known, uses the call light appropriately. Continue with the plan of care.

## 2025-04-04 NOTE — PROGRESS NOTES
"  VS: /84 (BP Location: Right arm)   Pulse 107   Temp 97.8  F (36.6  C) (Oral)   Resp 16   Ht 1.676 m (5' 6\")   SpO2 97%   BMI 22.60 kg/m      O2: Room air saturations 97%. Using IS to level of 1200.    Output: Pt requested to use a pull up for occasional urgency. Pt has been walking back and forth to the toilet with staff and transfer belt and rolling walker. No issues with urine output   Last BM: Pt reports having a BM this am. Soft.   Activity: Assist of 1 with getting up and down out of bed. Pt denies pain    Skin: A lot of bruising on left side of face left knee and left elbow. Pt fell at home   Pain: Pt denies pain   CMS:    Dressing: Has sutures intact in between left thumb and second finger. Pt reports sutures have been in place for 10 days. Reached out to Dr Foster and checked with him about suture removal. He is checking into plan of cares for suture removal.    Diet: Regular. Tolerating well.    LDA: Pt requires new IV for IV fluid today.  tried x 3 . Now reaching out to Vascular to start IV.    Equipment: IV pole, pull ups, IS, walker, gait belt and bed alarm However pt is using call light appropriately.    Plan: Pt was suppose to discharge to outside rehab today. However after Dr Foster went over labs of patient. Placed discharge on hold. Will plan on IV fluid and continue to monitor patients status   Additional Info: Pt has her 66 wedding anniversary with her spouse today. Pt is delightful in conversation. Will continue to monitor patients status. Pt continues to be on Telemetry. Has been running A fib.       "

## 2025-04-05 ENCOUNTER — APPOINTMENT (OUTPATIENT)
Dept: OCCUPATIONAL THERAPY | Facility: CLINIC | Age: 89
End: 2025-04-05
Payer: COMMERCIAL

## 2025-04-05 LAB
ANION GAP SERPL CALCULATED.3IONS-SCNC: 17 MMOL/L (ref 7–15)
BUN SERPL-MCNC: 71.4 MG/DL (ref 8–23)
CALCIUM SERPL-MCNC: 8.1 MG/DL (ref 8.8–10.4)
CHLORIDE SERPL-SCNC: 105 MMOL/L (ref 98–107)
CREAT SERPL-MCNC: 3.03 MG/DL (ref 0.51–0.95)
EGFRCR SERPLBLD CKD-EPI 2021: 14 ML/MIN/1.73M2
GLUCOSE SERPL-MCNC: 103 MG/DL (ref 70–99)
HCO3 SERPL-SCNC: 18 MMOL/L (ref 22–29)
HOLD SPECIMEN: NORMAL
POTASSIUM SERPL-SCNC: 4.2 MMOL/L (ref 3.4–5.3)
SODIUM SERPL-SCNC: 140 MMOL/L (ref 135–145)

## 2025-04-05 PROCEDURE — 80048 BASIC METABOLIC PNL TOTAL CA: CPT | Performed by: INTERNAL MEDICINE

## 2025-04-05 PROCEDURE — 36415 COLL VENOUS BLD VENIPUNCTURE: CPT | Performed by: INTERNAL MEDICINE

## 2025-04-05 PROCEDURE — 250N000009 HC RX 250: Performed by: INTERNAL MEDICINE

## 2025-04-05 PROCEDURE — 250N000013 HC RX MED GY IP 250 OP 250 PS 637: Performed by: STUDENT IN AN ORGANIZED HEALTH CARE EDUCATION/TRAINING PROGRAM

## 2025-04-05 PROCEDURE — 120N000002 HC R&B MED SURG/OB UMMC

## 2025-04-05 PROCEDURE — 250N000013 HC RX MED GY IP 250 OP 250 PS 637: Performed by: INTERNAL MEDICINE

## 2025-04-05 PROCEDURE — 258N000003 HC RX IP 258 OP 636: Performed by: INTERNAL MEDICINE

## 2025-04-05 PROCEDURE — 97535 SELF CARE MNGMENT TRAINING: CPT | Mod: GO | Performed by: OCCUPATIONAL THERAPIST

## 2025-04-05 PROCEDURE — 82374 ASSAY BLOOD CARBON DIOXIDE: CPT | Performed by: INTERNAL MEDICINE

## 2025-04-05 PROCEDURE — 250N000012 HC RX MED GY IP 250 OP 636 PS 637: Performed by: INTERNAL MEDICINE

## 2025-04-05 PROCEDURE — 82565 ASSAY OF CREATININE: CPT | Performed by: INTERNAL MEDICINE

## 2025-04-05 PROCEDURE — 99233 SBSQ HOSP IP/OBS HIGH 50: CPT | Performed by: INTERNAL MEDICINE

## 2025-04-05 PROCEDURE — 97530 THERAPEUTIC ACTIVITIES: CPT | Mod: GO | Performed by: OCCUPATIONAL THERAPIST

## 2025-04-05 RX ORDER — GINSENG 100 MG
CAPSULE ORAL 2 TIMES DAILY
Status: COMPLETED | OUTPATIENT
Start: 2025-04-05 | End: 2025-04-07

## 2025-04-05 RX ORDER — HYDROMORPHONE HYDROCHLORIDE 2 MG/1
2 TABLET ORAL EVERY 4 HOURS PRN
Status: DISCONTINUED | OUTPATIENT
Start: 2025-04-05 | End: 2025-04-09 | Stop reason: HOSPADM

## 2025-04-05 RX ORDER — SODIUM CHLORIDE 9 MG/ML
INJECTION, SOLUTION INTRAVENOUS CONTINUOUS
Status: DISCONTINUED | OUTPATIENT
Start: 2025-04-05 | End: 2025-04-06

## 2025-04-05 RX ADMIN — METOPROLOL TARTRATE 50 MG: 50 TABLET, FILM COATED ORAL at 19:05

## 2025-04-05 RX ADMIN — ACETAMINOPHEN 975 MG: 325 TABLET, FILM COATED ORAL at 13:51

## 2025-04-05 RX ADMIN — METHOCARBAMOL 500 MG: 500 TABLET ORAL at 21:50

## 2025-04-05 RX ADMIN — ROSUVASTATIN 10 MG: 10 TABLET, FILM COATED ORAL at 07:38

## 2025-04-05 RX ADMIN — ISOSORBIDE MONONITRATE 90 MG: 60 TABLET, EXTENDED RELEASE ORAL at 07:38

## 2025-04-05 RX ADMIN — HYDROMORPHONE HYDROCHLORIDE 2 MG: 2 TABLET ORAL at 00:19

## 2025-04-05 RX ADMIN — POTASSIUM CHLORIDE 20 MEQ: 750 CAPSULE, EXTENDED RELEASE ORAL at 07:37

## 2025-04-05 RX ADMIN — OMEPRAZOLE 40 MG: 20 CAPSULE, DELAYED RELEASE ORAL at 07:36

## 2025-04-05 RX ADMIN — AMLODIPINE BESYLATE 5 MG: 5 TABLET ORAL at 07:38

## 2025-04-05 RX ADMIN — PREDNISONE 30 MG: 20 TABLET ORAL at 07:37

## 2025-04-05 RX ADMIN — LIDOCAINE 4% 2 PATCH: 40 PATCH TOPICAL at 19:04

## 2025-04-05 RX ADMIN — SODIUM BICARBONATE 650 MG TABLET 650 MG: at 19:05

## 2025-04-05 RX ADMIN — SODIUM BICARBONATE 650 MG TABLET 650 MG: at 07:36

## 2025-04-05 RX ADMIN — ACETAMINOPHEN 975 MG: 325 TABLET, FILM COATED ORAL at 21:50

## 2025-04-05 RX ADMIN — TIMOLOL MALEATE 1 DROP: 5 SOLUTION/ DROPS OPHTHALMIC at 10:26

## 2025-04-05 RX ADMIN — ACETAMINOPHEN 975 MG: 325 TABLET, FILM COATED ORAL at 04:53

## 2025-04-05 RX ADMIN — LOSARTAN POTASSIUM 25 MG: 25 TABLET, FILM COATED ORAL at 07:37

## 2025-04-05 RX ADMIN — CYANOCOBALAMIN TAB 500 MCG 500 MCG: 500 TAB at 07:37

## 2025-04-05 RX ADMIN — CLOPIDOGREL BISULFATE 75 MG: 75 TABLET ORAL at 07:38

## 2025-04-05 RX ADMIN — SODIUM CHLORIDE: 0.9 INJECTION, SOLUTION INTRAVENOUS at 07:34

## 2025-04-05 RX ADMIN — Medication 1 CAPSULE: at 07:36

## 2025-04-05 RX ADMIN — Medication 100 MG: at 07:36

## 2025-04-05 RX ADMIN — METOPROLOL TARTRATE 50 MG: 50 TABLET, FILM COATED ORAL at 07:38

## 2025-04-05 RX ADMIN — LOSARTAN POTASSIUM 25 MG: 25 TABLET, FILM COATED ORAL at 19:05

## 2025-04-05 RX ADMIN — Medication 1 CAPSULE: at 19:04

## 2025-04-05 RX ADMIN — BACITRACIN: 500 OINTMENT TOPICAL at 19:06

## 2025-04-05 RX ADMIN — SODIUM CHLORIDE: 0.9 INJECTION, SOLUTION INTRAVENOUS at 17:43

## 2025-04-05 ASSESSMENT — ACTIVITIES OF DAILY LIVING (ADL)
ADLS_ACUITY_SCORE: 44
ADLS_ACUITY_SCORE: 48
ADLS_ACUITY_SCORE: 44
ADLS_ACUITY_SCORE: 48
ADLS_ACUITY_SCORE: 44
ADLS_ACUITY_SCORE: 48
ADLS_ACUITY_SCORE: 44
ADLS_ACUITY_SCORE: 48

## 2025-04-05 NOTE — PLAN OF CARE
"Goal Outcome Evaluation:      Plan of Care Reviewed With: patient    Overall Patient Progress: improvingOverall Patient Progress: improving      VS: /77 (BP Location: Right arm)   Pulse 106   Temp 97.9  F (36.6  C) (Oral)   Resp 16   Ht 1.676 m (5' 6\")   Wt 67.9 kg (149 lb 11.1 oz)   SpO2 95%   BMI 24.16 kg/m       Output: Pt is voiding in the bathroom. Occasional urgency. Pt has brief on.   Last BM: LBM 4/4   Activity: Up with SBA. Uses walker.   Skin: Scattered bruises.   Pain: C/o pain to R knee and low back. Given scheduled tylenol and prn Robaxin with relief.   Neuro: A&O X4. Denies N/T.   Dressing: None.   Diet: Tolerating regular diet.   LDA: PIV infusing NS at 125 ml/hr into left forearm.   Equipment:  IV pole and patient belongings.   Plan: Discharge plan to TCU once medically stable.    Additional Info:               "

## 2025-04-05 NOTE — PROGRESS NOTES
"  VS: /87 (BP Location: Left arm)   Pulse 93   Temp 97.5  F (36.4  C) (Oral)   Resp 16   Ht 1.676 m (5' 6\")   Wt 68.9 kg (151 lb 14.4 oz)   SpO2 96%   BMI 24.52 kg/m      O2: Room air saturations 96%. Pt still needs encouragement with using the IS   Output: Pt is up to bathroom  with assist of 1 and walker and staff. Using pull up for urgency.    Last BM: Pt had a very Large Bm Last evening. She reports liking to order prune juice at supper time and this has been very helpful to her.    Activity: Up with assist of 1 and walker and gait belt.    Skin: Bruising on left side of face, left elbow,left knee and left hand after fall at home. Pt had her sutures removed from left hand between thumb and first finger. Bacitracin ointment was added topically on top of old site.    Pain: Pt has been scheduled with Tylenol for pain. Pt reports very little pain   CMS: Intact   Dressing:    Diet: Regular. Tolerating well   LDA: IV infusing well for fluids.   Equipment: Walker, gait belt, IS, bed alarm, commode, pull ups   Plan: Plan is to continue with IV fluid to infuse. Potential discharge to rehab on Monday if labs continue to improve.    Additional Info: Pt is delightful in conversation. Loves to talk about her family and her marriage to her . Pt has been resting comfortably between cares.       " Home

## 2025-04-05 NOTE — PROGRESS NOTES
[facility]    Medicine Progress Note - Hospitalist Service, GOLD TEAM 16    Date of Admission:  4/1/2025    Assessment & Plan   89 year old female admitted on 4/1/2025. She has a history of HTN, CKD4, CAD s/p PCI, Afib s/p Watchman (8/2024) on Plavix, recurrent GIB, h/o DVT in 2010. She is admitted for severe worsening pain, particularly in the right knee, back, hip, and foot c/f trauma vs infectious/septic vs inflammatory cause of pain. She requires admission for pain management, PT/OT assessment, and further evaluation and management. Patient had a right knee arthrocentesis that showed calcium pyrophospate crystals. Cultures remained negative. Patient was started on steroids and she had a significant improvement.   PT / OT recommended TCU.         4/5/25  Hand off taken from Dr Chaparro.  Today was the first enounter with this patient .   Continue IVF   Discharge planning for Monday 4/7  PIV lost night so did not get much fluids  Discussed with nursing and care rounds       Right knee pain and effusion, inability to bear weight c/f septic joint  Leukocytosis + elevated inflammatory markers  Recent fall with significant trauma + generalized weakness  History of osteoporosis and osteoarthritis  Degenerative disc disease + neural foraminal stenosis at L4-L5, L5-S1  Synovial analysis notable for calcium pyrophospate crystals. Acute pseudogout involving multiple joints.   off ABX.   -Continue on Prednisone taper.   -CRP trending down.   -Leucocytosis resolved.       Hypertension, poorly controlled + Hyperlipidemia  CAD s/p PCI (multiple stents, most recent 2013)  HFpEF (60-65%), compensated  Afib s/p Watchman only on Plavix  History of complete heart block s/p PPM placement (2007)  History of DVT with infrarenal IVC filter  Patient with significant heart history including multiple episodes of stenting, cardiac pacemaker placement due to history of complete heart  block, and Afib with Watchman placement due to inability to tolerate long-term AC, now only on Plavix. Recent MICHELL in 10/2024 showed LVEF of 60-65%, moderate mitral and tricuspid valve regurgitation. On arrival to the ED, patient with significant hypertension and SBPs in the 200s, Afib with HR in the 160s (RVR) but asymptomatic including no chest pain, shortness of breath, palpitations. She was given diltiazem IV and then her home medications with improvement in rates and BP (also likely some component of pain control with improvement).   - Continue PTA meds: Amlodipine 5 mg daily, Imdur 90 mg daily, Losartan 25 mg BID, metoprolol tartrate 50 mg BID, rosuvastatin  - Continue Plavix, does not appear to be on ASA currently (was d/c'd 11/2024 per Cards)  - Hold PTA Lasix for now      SHAJI on CKD Stage 4  History of hypokalemia  Secondary renal hyperparathyroidism  - Considered to be prerenal. Start IVF's. Continue monitoring renal function and lytes.        Normocytic anemia of chronic disease, chronic thrombocytopenia: Continue PTA vitamin B12 and vitamin D3; follow-up outpatient (has received BREANNA and venofer in the past)  Gout: PTA allopurinol  GERD: PTA omeprazole  KEVYN: CPAP at night/while asleep; continuous pulse ox  Asthma/COPD: Continue PTA Advair          Diet: Combination Diet Regular Diet Adult  Diet    DVT Prophylaxis: PCD  Bonner Catheter: Not present  Lines: None     Cardiac Monitoring: None  Code Status: Full Code      Clinically Significant Risk Factors                  # Acute Kidney Injury, unspecified: based on a >150% or 0.3 mg/dL increase in last creatinine compared to past 90 day average, will monitor renal function  # Hypertension: Noted on problem list  # Chronic heart failure with preserved ejection fraction: heart failure noted on problem list and last echo with EF >50%               # Financial/Environmental Concerns:     # Pacemaker present       Social Drivers of Health    Housing Stability:  High Risk (4/2/2025)    Housing Stability     Do you have housing? : No     Are you worried about losing your housing?: No   Tobacco Use: Medium Risk (2/17/2025)    Patient History     Smoking Tobacco Use: Former     Smokeless Tobacco Use: Never          Disposition Plan     Medically Ready for Discharge: Anticipated in 2-4 Days             Christen Prince MD  Hospitalist Service, GOLD TEAM 16  [facility]  Securely message with [employer] (more info)  Text page via [employer] Paging/Directory   See signed in provider for up to date coverage information  ______________________________________________________________________    Interval History   Wants to get the sutures out of her hand , was told could be removed in 7-10 day s  No chest   No sob   No fever   No chills  Hard stick   Verbalized TCU Monday     Physical Exam   Vital Signs: Temp: 97.9  F (36.6  C) Temp src: Oral BP: 122/77 Pulse: 106   Resp: 16 SpO2: 95 % O2 Device: None (Room air)    Weight: 149 lbs 11.08 oz         Alert and oriented . In no acute distress .bruise on left side of face where she fell at home  Chest ; Breath sounds are equal BL. No respiratory distress noted .  Cardiovascular Exam ; S1 & S2 .  GI ; Abdomen is soft and non tender. BS positive .  Skin ; no jaundice noted.  Psych ; Viv mood & affect.      Medical Decision Making       51 MINUTES SPENT BY ME on the date of service doing chart review, history, exam, documentation & further activities per the note.      Data     I have personally reviewed the following data over the past 24 hrs:    8.0  \   9.8 (L)   / 149 (L)     140 105 71.4 (H) /  103 (H)   4.2 18 (L) 3.03 (H) \     Procal: N/A CRP: 125.91 (H) Lactic Acid: N/A

## 2025-04-05 NOTE — PROGRESS NOTES
VS: Temp: 97.9  F (36.6  C) Temp src: Oral BP: 122/77 Pulse: 106   Resp: 16 SpO2: 95 % O2 Device: None (Room air)       O2: On room air, no cough noted, denies SOB   Output: Voids adequately in the bathroom   Last BM: 4/4   Activity: Up with a SBA of 1, walker /GB   Skin: Bruises to the side of the face, neck area, bilateral arms   Pain: States its at a 11/10 to knee and lower back, dilaudid utilized   CMS: A&O /4, emotionally weepy,   Dressing: N/A   Diet: REG   LDA: PIV to RA, infusing NS at 125mL/hr   Equipment: Personal belongings. Walker, IV pole/pump   Plan: Discharge contingent on CR levels   Additional Info:

## 2025-04-05 NOTE — PROGRESS NOTES
Sutures removed from left hand per order of Dr. Prince. No bleeding noted, edges approximated and patient reported no pain to area. Area open to air. Informed bedside RN Neha.

## 2025-04-06 ENCOUNTER — APPOINTMENT (OUTPATIENT)
Dept: PHYSICAL THERAPY | Facility: CLINIC | Age: 89
End: 2025-04-06
Payer: COMMERCIAL

## 2025-04-06 LAB
ANION GAP SERPL CALCULATED.3IONS-SCNC: 14 MMOL/L (ref 7–15)
BACTERIA BLD CULT: NO GROWTH
BACTERIA BLD CULT: NO GROWTH
BACTERIA SNV CULT: NO GROWTH
BUN SERPL-MCNC: 51.8 MG/DL (ref 8–23)
CALCIUM SERPL-MCNC: 8.3 MG/DL (ref 8.8–10.4)
CHLORIDE SERPL-SCNC: 114 MMOL/L (ref 98–107)
CREAT SERPL-MCNC: 2.11 MG/DL (ref 0.51–0.95)
EGFRCR SERPLBLD CKD-EPI 2021: 22 ML/MIN/1.73M2
GLUCOSE SERPL-MCNC: 100 MG/DL (ref 70–99)
GRAM STAIN RESULT: NORMAL
GRAM STAIN RESULT: NORMAL
HCO3 SERPL-SCNC: 17 MMOL/L (ref 22–29)
HOLD SPECIMEN: NORMAL
POTASSIUM SERPL-SCNC: 3.8 MMOL/L (ref 3.4–5.3)
SODIUM SERPL-SCNC: 145 MMOL/L (ref 135–145)

## 2025-04-06 PROCEDURE — 97116 GAIT TRAINING THERAPY: CPT | Mod: GP

## 2025-04-06 PROCEDURE — 99233 SBSQ HOSP IP/OBS HIGH 50: CPT | Performed by: INTERNAL MEDICINE

## 2025-04-06 PROCEDURE — 250N000013 HC RX MED GY IP 250 OP 250 PS 637: Performed by: INTERNAL MEDICINE

## 2025-04-06 PROCEDURE — 97530 THERAPEUTIC ACTIVITIES: CPT | Mod: GP

## 2025-04-06 PROCEDURE — 250N000012 HC RX MED GY IP 250 OP 636 PS 637: Performed by: INTERNAL MEDICINE

## 2025-04-06 PROCEDURE — 258N000003 HC RX IP 258 OP 636: Performed by: INTERNAL MEDICINE

## 2025-04-06 PROCEDURE — 80048 BASIC METABOLIC PNL TOTAL CA: CPT | Performed by: INTERNAL MEDICINE

## 2025-04-06 PROCEDURE — 120N000002 HC R&B MED SURG/OB UMMC

## 2025-04-06 PROCEDURE — 93005 ELECTROCARDIOGRAM TRACING: CPT

## 2025-04-06 PROCEDURE — 250N000013 HC RX MED GY IP 250 OP 250 PS 637: Performed by: STUDENT IN AN ORGANIZED HEALTH CARE EDUCATION/TRAINING PROGRAM

## 2025-04-06 PROCEDURE — 36415 COLL VENOUS BLD VENIPUNCTURE: CPT | Performed by: INTERNAL MEDICINE

## 2025-04-06 RX ORDER — HYDRALAZINE HYDROCHLORIDE 20 MG/ML
10 INJECTION INTRAMUSCULAR; INTRAVENOUS EVERY 6 HOURS PRN
Status: DISCONTINUED | OUTPATIENT
Start: 2025-04-06 | End: 2025-04-07

## 2025-04-06 RX ORDER — METOPROLOL TARTRATE 100 MG/1
100 TABLET ORAL 2 TIMES DAILY
Status: DISCONTINUED | OUTPATIENT
Start: 2025-04-06 | End: 2025-04-09 | Stop reason: HOSPADM

## 2025-04-06 RX ADMIN — ROSUVASTATIN 10 MG: 10 TABLET, FILM COATED ORAL at 08:11

## 2025-04-06 RX ADMIN — ACETAMINOPHEN 975 MG: 325 TABLET, FILM COATED ORAL at 04:21

## 2025-04-06 RX ADMIN — METOPROLOL TARTRATE 100 MG: 100 TABLET, FILM COATED ORAL at 19:39

## 2025-04-06 RX ADMIN — BACITRACIN: 500 OINTMENT TOPICAL at 08:18

## 2025-04-06 RX ADMIN — METOPROLOL TARTRATE 50 MG: 50 TABLET, FILM COATED ORAL at 06:23

## 2025-04-06 RX ADMIN — SODIUM BICARBONATE 650 MG TABLET 650 MG: at 08:11

## 2025-04-06 RX ADMIN — SODIUM CHLORIDE: 0.9 INJECTION, SOLUTION INTRAVENOUS at 04:20

## 2025-04-06 RX ADMIN — PREDNISONE 30 MG: 20 TABLET ORAL at 08:12

## 2025-04-06 RX ADMIN — CYANOCOBALAMIN TAB 500 MCG 500 MCG: 500 TAB at 08:12

## 2025-04-06 RX ADMIN — LOSARTAN POTASSIUM 25 MG: 25 TABLET, FILM COATED ORAL at 08:12

## 2025-04-06 RX ADMIN — LOSARTAN POTASSIUM 25 MG: 25 TABLET, FILM COATED ORAL at 19:40

## 2025-04-06 RX ADMIN — ACETAMINOPHEN 975 MG: 325 TABLET, FILM COATED ORAL at 13:53

## 2025-04-06 RX ADMIN — Medication 100 MG: at 08:12

## 2025-04-06 RX ADMIN — METHOCARBAMOL 500 MG: 500 TABLET ORAL at 20:33

## 2025-04-06 RX ADMIN — ISOSORBIDE MONONITRATE 90 MG: 60 TABLET, EXTENDED RELEASE ORAL at 06:23

## 2025-04-06 RX ADMIN — Medication 25 MCG: at 08:13

## 2025-04-06 RX ADMIN — Medication 1 CAPSULE: at 08:12

## 2025-04-06 RX ADMIN — ACETAMINOPHEN 975 MG: 325 TABLET, FILM COATED ORAL at 20:33

## 2025-04-06 RX ADMIN — Medication 1 CAPSULE: at 19:39

## 2025-04-06 RX ADMIN — AMLODIPINE BESYLATE 5 MG: 5 TABLET ORAL at 06:22

## 2025-04-06 RX ADMIN — TIMOLOL MALEATE 1 DROP: 5 SOLUTION/ DROPS OPHTHALMIC at 08:10

## 2025-04-06 RX ADMIN — POTASSIUM CHLORIDE 20 MEQ: 750 CAPSULE, EXTENDED RELEASE ORAL at 08:12

## 2025-04-06 RX ADMIN — HYDROMORPHONE HYDROCHLORIDE 2 MG: 2 TABLET ORAL at 08:17

## 2025-04-06 RX ADMIN — CLOPIDOGREL BISULFATE 75 MG: 75 TABLET ORAL at 08:13

## 2025-04-06 RX ADMIN — BACITRACIN: 500 OINTMENT TOPICAL at 20:34

## 2025-04-06 RX ADMIN — LIDOCAINE 4% 2 PATCH: 40 PATCH TOPICAL at 19:40

## 2025-04-06 RX ADMIN — SODIUM BICARBONATE 650 MG TABLET 650 MG: at 19:39

## 2025-04-06 RX ADMIN — OMEPRAZOLE 40 MG: 20 CAPSULE, DELAYED RELEASE ORAL at 08:12

## 2025-04-06 ASSESSMENT — ACTIVITIES OF DAILY LIVING (ADL)
ADLS_ACUITY_SCORE: 48

## 2025-04-06 NOTE — PROGRESS NOTES
[facility]    Medicine Progress Note - Hospitalist Service, GOLD TEAM 16    Date of Admission:  4/1/2025    Assessment & Plan   89 year old female admitted on 4/1/2025. She has a history of HTN, CKD4, CAD s/p PCI, Afib s/p Watchman (8/2024) on Plavix, recurrent GIB, h/o DVT in 2010. She is admitted for severe worsening pain, particularly in the right knee, back, hip, and foot c/f trauma vs infectious/septic vs inflammatory cause of pain. She requires admission for pain management, PT/OT assessment, and further evaluation and management. Patient had a right knee arthrocentesis that showed calcium pyrophospate crystals. Cultures remained negative. Patient was started on steroids and she had a significant improvement.   PT / OT recommended TCU.         4/6/25    Stop IVF   Creatinine improved   Restart lasix  Spoke with bedside RN and care team   Discharge planing for tomorrow        Right knee pain and effusion, inability to bear weight c/f septic joint  Leukocytosis + elevated inflammatory markers  Recent fall with significant trauma + generalized weakness  History of osteoporosis and osteoarthritis  Degenerative disc disease + neural foraminal stenosis at L4-L5, L5-S1  Synovial analysis notable for calcium pyrophospate crystals. Acute pseudogout involving multiple joints.   off ABX.   -Continue on Prednisone taper.   -CRP trending down.   -Leucocytosis resolved.       Hypertension, poorly controlled + Hyperlipidemia  CAD s/p PCI (multiple stents, most recent 2013)  HFpEF (60-65%), compensated  Afib s/p Watchman only on Plavix  History of complete heart block s/p PPM placement (2007)  History of DVT with infrarenal IVC filter  Patient with significant heart history including multiple episodes of stenting, cardiac pacemaker placement due to history of complete heart block, and Afib with Watchman placement due to inability to tolerate long-term AC, now only on Plavix.  Recent MICHELL in 10/2024 showed LVEF of 60-65%, moderate mitral and tricuspid valve regurgitation. On arrival to the ED, patient with significant hypertension and SBPs in the 200s, Afib with HR in the 160s (RVR) but asymptomatic including no chest pain, shortness of breath, palpitations. She was given diltiazem IV and then her home medications with improvement in rates and BP (also likely some component of pain control with improvement).   - Continue PTA meds: Amlodipine 5 mg daily, Imdur 90 mg daily, Losartan 25 mg BID, metoprolol tartrate 50 mg BID, rosuvastatin  - Continue Plavix, does not appear to be on ASA currently (was d/c'd 11/2024 per Cards)       SHAJI on CKD Stage 4  History of hypokalemia  Secondary renal hyperparathyroidism  - Considered to be prerenal. Start IVF's. Continue monitoring renal function and lytes.        Normocytic anemia of chronic disease, chronic thrombocytopenia: Continue PTA vitamin B12 and vitamin D3; follow-up outpatient (has received BREANNA and venofer in the past)  Gout: PTA allopurinol  GERD: PTA omeprazole  KEVYN: CPAP at night/while asleep; continuous pulse ox  Asthma/COPD: Continue PTA Advair          Diet: Combination Diet Regular Diet Adult  Diet    DVT Prophylaxis: PCD  Bonner Catheter: Not present  Lines: None     Cardiac Monitoring: None  Code Status: Full Code      Clinically Significant Risk Factors          # Hyperchloremia: Highest Cl = 114 mmol/L in last 2 days, will monitor as appropriate              # Hypertension: Noted on problem list  # Chronic heart failure with preserved ejection fraction: heart failure noted on problem list and last echo with EF >50%               # Financial/Environmental Concerns:     # Pacemaker present       Social Drivers of Health    Housing Stability: High Risk (4/2/2025)    Housing Stability     Do you have housing? : No     Are you worried about losing your housing?: No   Tobacco Use: Medium Risk (2/17/2025)    Patient History     Smoking  Tobacco Use: Former     Smokeless Tobacco Use: Never          Disposition Plan     Medically Ready for Discharge: Anticipated Today             Christen Prince MD  Hospitalist Service, GOLD TEAM 16  M [facility]  Securely message with [employer] (more info)  Text page via [employer] Paging/Directory   See signed in provider for up to date coverage information  ______________________________________________________________________    Interval History   No new symptoms reported per nursing staff .  No chest pain or Shortness of breath reported.  No Fever   No vomiting   No difficulty with voiding   Passing gas .  Tolerating diet     4 system ROS reviewed .     Physical Exam   Vital Signs: Temp: 97.7  F (36.5  C) Temp src: Oral BP: (!) 147/119 Pulse: 108   Resp: 18 SpO2: 97 % O2 Device: None (Room air)    Weight: 151 lbs 6.4 oz         Alert and oriented . In no acute distress .  Chest ; Breath sounds are equal BL. No respiratory distress noted .  Cardiovascular Exam ; S1 & S2 .  GI ; Abdomen is soft and non tender. BS positive .  Skin ; no jaundice noted.  Psych ; Viv mood & affect.      Medical Decision Making       51 MINUTES SPENT BY ME on the date of service doing chart review, history, exam, documentation & further activities per the note.      Data     I have personally reviewed the following data over the past 24 hrs:    N/A  \   N/A   / N/A     145 114 (H) 51.8 (H) /  100 (H)   3.8 17 (L) 2.11 (H) \

## 2025-04-06 NOTE — PROGRESS NOTES
A/P: Palpitations in patient with atrial fibrillation.  - Will increase metoprolol tartrate to 100 mg twice daily and monitor response.    S: Patient noted palpitations of varying intensity today. Patient noted some dyspnea associated with palpitations. Patient noted no chest pain.    O: Vital signs noted. Pulse 107.  General: Patient comfortable, NAD. Mildly anxious.  Heart: Somewhat fast, irregular.    EKG showing atrial fibrillation.

## 2025-04-06 NOTE — PLAN OF CARE
"Goal Outcome Evaluation:      Plan of Care Reviewed With: patient    Overall Patient Progress: improvingOverall Patient Progress: improving      VS: /87 (BP Location: Left arm)   Pulse 93   Temp 97.5  F (36.4  C) (Oral)   Resp 17   Ht 1.676 m (5' 6\")   Wt 68.9 kg (151 lb 14.4 oz)   SpO2 96%   BMI 24.52 kg/m       Output: Pt is voiding in the bathroom. Occasional urgency. Pt has brief on.   Last BM: LBM 4/4   Activity: Up with SBA. Uses walker.   Skin: Scattered bruises.   Pain: C/o pain to R knee and low back. Given scheduled tylenol and prn Robaxin with slight relief.   Neuro: A&O X4. Denies N/T.   Dressing: None.   Diet: Tolerating regular diet.   LDA: PIV infusing NS at 125 ml/hr into left forearm.   Equipment:  IV pole and patient belongings.   Plan: Discharge plan to TCU once medically stable.    Additional Info:                  "

## 2025-04-06 NOTE — PROGRESS NOTES
VS: Temp: 97.7  F (36.5  C) Temp src: Oral BP: (!) 162/99 Pulse: 108   Resp: 18 SpO2: 97 % O2 Device: None (Room air)     O2: On room air, no cough noted, denies SOB   Output: Voids adequately in the bathroom/commode, prefers commode because it sits higher   Last BM: 4/6- medium soft   Activity: Up with a SBA of 1, walker /GB, steady gait   Skin: Bruises to the side of the face, neck area, bilateral arms   Pain: Managed by dilaudid , scheduled tylenol   CMS: A&O /4, calm and cooperative   Dressing: N/A   Diet: REG   LDA: PIV to RA, infusing NS at 125mL/hr   Equipment: Personal belongings. Walker, IV pole/pump   Plan: Discharge contingent on CR levels   Additional Info:  BP elevation, MD notified, BP medications given early

## 2025-04-06 NOTE — PLAN OF CARE
"Goal Outcome Evaluation:      Plan of Care Reviewed With: patient    Overall Patient Progress: improvingOverall Patient Progress: improving           VS: /87 (BP Location: Left arm)   Pulse 103   Temp 97.6  F (36.4  C) (Oral)   Resp 16   Ht 1.676 m (5' 6\")   Wt 68.7 kg (151 lb 6.4 oz)   SpO2 96%   BMI 24.44 kg/m     O2: Stable on RA. Denies SOB and CP.    Output: Voids spontaneously and adequately in bathroom/commode, prefers commode for higher seat   Last BM: 4/6/25   Activity: SBA with W/GB, steady gait   Skin: Bruises to the side of the face, neck area, bilateral arms   Pain: Managed by PO dilaudid , scheduled tylenol   CMS: A&O x 4   Dressing: None   Diet: Regular diet   LDA: R PIV SL   Equipment: Personal belongings, walker, gait belt, and IV pole   Plan: Anticipate discharge to TCU tomorrow pending CR levels   Additional Info:        " (3) no apparent problem

## 2025-04-07 ENCOUNTER — APPOINTMENT (OUTPATIENT)
Dept: PHYSICAL THERAPY | Facility: CLINIC | Age: 89
DRG: 554 | End: 2025-04-07
Payer: COMMERCIAL

## 2025-04-07 LAB
ATRIAL RATE - MUSE: 182 BPM
DIASTOLIC BLOOD PRESSURE - MUSE: NORMAL MMHG
INTERPRETATION ECG - MUSE: NORMAL
P AXIS - MUSE: NORMAL DEGREES
PR INTERVAL - MUSE: NORMAL MS
QRS DURATION - MUSE: 86 MS
QT - MUSE: 314 MS
QTC - MUSE: 458 MS
R AXIS - MUSE: 19 DEGREES
SYSTOLIC BLOOD PRESSURE - MUSE: NORMAL MMHG
T AXIS - MUSE: 3 DEGREES
VENTRICULAR RATE- MUSE: 128 BPM

## 2025-04-07 PROCEDURE — 120N000002 HC R&B MED SURG/OB UMMC

## 2025-04-07 PROCEDURE — 250N000013 HC RX MED GY IP 250 OP 250 PS 637: Performed by: INTERNAL MEDICINE

## 2025-04-07 PROCEDURE — 97116 GAIT TRAINING THERAPY: CPT | Mod: GP

## 2025-04-07 PROCEDURE — 97530 THERAPEUTIC ACTIVITIES: CPT | Mod: GP

## 2025-04-07 PROCEDURE — 99233 SBSQ HOSP IP/OBS HIGH 50: CPT | Performed by: INTERNAL MEDICINE

## 2025-04-07 PROCEDURE — 250N000011 HC RX IP 250 OP 636: Mod: JZ | Performed by: INTERNAL MEDICINE

## 2025-04-07 PROCEDURE — 250N000013 HC RX MED GY IP 250 OP 250 PS 637: Performed by: STUDENT IN AN ORGANIZED HEALTH CARE EDUCATION/TRAINING PROGRAM

## 2025-04-07 PROCEDURE — 250N000012 HC RX MED GY IP 250 OP 636 PS 637: Performed by: INTERNAL MEDICINE

## 2025-04-07 RX ORDER — HYDRALAZINE HYDROCHLORIDE 20 MG/ML
10 INJECTION INTRAMUSCULAR; INTRAVENOUS EVERY 6 HOURS PRN
Status: DISCONTINUED | OUTPATIENT
Start: 2025-04-07 | End: 2025-04-09 | Stop reason: HOSPADM

## 2025-04-07 RX ADMIN — ACETAMINOPHEN 975 MG: 325 TABLET, FILM COATED ORAL at 05:27

## 2025-04-07 RX ADMIN — CYANOCOBALAMIN TAB 500 MCG 500 MCG: 500 TAB at 07:57

## 2025-04-07 RX ADMIN — Medication 1 CAPSULE: at 20:36

## 2025-04-07 RX ADMIN — BACITRACIN: 500 OINTMENT TOPICAL at 08:03

## 2025-04-07 RX ADMIN — ROSUVASTATIN 10 MG: 10 TABLET, FILM COATED ORAL at 07:57

## 2025-04-07 RX ADMIN — FUROSEMIDE 40 MG: 20 TABLET ORAL at 07:58

## 2025-04-07 RX ADMIN — LOSARTAN POTASSIUM 25 MG: 25 TABLET, FILM COATED ORAL at 20:37

## 2025-04-07 RX ADMIN — HYDRALAZINE HYDROCHLORIDE 10 MG: 20 INJECTION INTRAMUSCULAR; INTRAVENOUS at 20:37

## 2025-04-07 RX ADMIN — METOPROLOL TARTRATE 100 MG: 100 TABLET, FILM COATED ORAL at 20:37

## 2025-04-07 RX ADMIN — CLOPIDOGREL BISULFATE 75 MG: 75 TABLET ORAL at 07:57

## 2025-04-07 RX ADMIN — HYDROMORPHONE HYDROCHLORIDE 2 MG: 2 TABLET ORAL at 17:45

## 2025-04-07 RX ADMIN — PREDNISONE 20 MG: 20 TABLET ORAL at 07:58

## 2025-04-07 RX ADMIN — AMLODIPINE BESYLATE 5 MG: 5 TABLET ORAL at 07:58

## 2025-04-07 RX ADMIN — SODIUM BICARBONATE 650 MG TABLET 650 MG: at 07:57

## 2025-04-07 RX ADMIN — ACETAMINOPHEN 975 MG: 325 TABLET, FILM COATED ORAL at 13:40

## 2025-04-07 RX ADMIN — METOPROLOL TARTRATE 100 MG: 100 TABLET, FILM COATED ORAL at 07:57

## 2025-04-07 RX ADMIN — SODIUM BICARBONATE 650 MG TABLET 650 MG: at 20:36

## 2025-04-07 RX ADMIN — HYDROMORPHONE HYDROCHLORIDE 2 MG: 2 TABLET ORAL at 13:39

## 2025-04-07 RX ADMIN — OMEPRAZOLE 40 MG: 20 CAPSULE, DELAYED RELEASE ORAL at 07:57

## 2025-04-07 RX ADMIN — Medication 100 MG: at 07:57

## 2025-04-07 RX ADMIN — ACETAMINOPHEN 975 MG: 325 TABLET, FILM COATED ORAL at 20:37

## 2025-04-07 RX ADMIN — POTASSIUM CHLORIDE 20 MEQ: 750 CAPSULE, EXTENDED RELEASE ORAL at 07:58

## 2025-04-07 RX ADMIN — HYDROMORPHONE HYDROCHLORIDE 2 MG: 2 TABLET ORAL at 20:56

## 2025-04-07 RX ADMIN — METHOCARBAMOL 500 MG: 500 TABLET ORAL at 22:26

## 2025-04-07 RX ADMIN — ISOSORBIDE MONONITRATE 90 MG: 60 TABLET, EXTENDED RELEASE ORAL at 07:57

## 2025-04-07 RX ADMIN — TIMOLOL MALEATE 1 DROP: 5 SOLUTION/ DROPS OPHTHALMIC at 08:03

## 2025-04-07 RX ADMIN — Medication 1 CAPSULE: at 07:58

## 2025-04-07 RX ADMIN — LOSARTAN POTASSIUM 25 MG: 25 TABLET, FILM COATED ORAL at 07:57

## 2025-04-07 RX ADMIN — LIDOCAINE 4% 3 PATCH: 40 PATCH TOPICAL at 17:45

## 2025-04-07 ASSESSMENT — ACTIVITIES OF DAILY LIVING (ADL)
ADLS_ACUITY_SCORE: 48
ADLS_ACUITY_SCORE: 48
ADLS_ACUITY_SCORE: 45
ADLS_ACUITY_SCORE: 45
ADLS_ACUITY_SCORE: 48
ADLS_ACUITY_SCORE: 45
ADLS_ACUITY_SCORE: 45
ADLS_ACUITY_SCORE: 48
ADLS_ACUITY_SCORE: 48
ADLS_ACUITY_SCORE: 45
ADLS_ACUITY_SCORE: 48
ADLS_ACUITY_SCORE: 45
ADLS_ACUITY_SCORE: 45
ADLS_ACUITY_SCORE: 48
ADLS_ACUITY_SCORE: 45
ADLS_ACUITY_SCORE: 48
ADLS_ACUITY_SCORE: 45
ADLS_ACUITY_SCORE: 45

## 2025-04-07 NOTE — PLAN OF CARE
"Goal Outcome Evaluation:      Plan of Care Reviewed With: patient    Overall Patient Progress: improvingOverall Patient Progress: improving         VS: BP (!) 157/113 (BP Location: Left arm)   Pulse 110   Temp 97.2  F (36.2  C) (Oral)   Resp 16   Ht 1.676 m (5' 6\")   Wt 68.7 kg (151 lb 6.4 oz)   SpO2 98%   BMI 24.44 kg/m     HTN  Tachycardic   O2: Stable on room air   Output: Voids without difficulty    Last BM: 4/6   Activity: Up with assist of 1. Gait belt and walker   Skin: Scattered bruises. Wound to left hand.    Pain: Managed with Scheduled Tylenol   CMS: Intact, denies N/T   Dressing: none   Diet: Reg diet   LDA: Right PIV-SL   Plan: 4/7/2025 discharge to [facility] if patient is medically ready.    Additional Info:         "

## 2025-04-07 NOTE — PLAN OF CARE
Goal Outcome Evaluation:      Plan of Care Reviewed With: patient    Overall Patient Progress: improvingOverall Patient Progress: improving       VS: Pt has been tachycardic and hypertensive. Pt stated  she can feel her heart pacing, Page Dr. Nieto. Order received for EKG and to increase Metoprolol dosage.   2200: pt reported that she felt dizzy when standing up from sitting position. Orthostatic BP completed per order, no change to BP. MD aware.        Output: Pt is voiding in the bathroom. Occasional urgency. Pt has brief on.   Last BM: LBM 4/6   Activity: Up with SBA. Uses walker.   Skin: Scattered bruises. Wound to left hand.    Pain: C/o pain to R knee and low back. Given scheduled tylenol and prn Robaxin with slight relief.   Neuro: A&O X4. Denies N/T.   Dressing: None.   Diet: Tolerating regular diet.   LDA: PIV  Sl into R forearm.    Equipment:  IV pole and patient belongings.   Plan: Discharge plan to TCU once medically stable.    Additional Info:

## 2025-04-07 NOTE — SIGNIFICANT EVENT
SPIRITUAL HEALTH SERVICES Significant Event  [employer]OINTING  [facility] ([facility]    Pt anointed by Father Emma Stephens   Pager [phone]

## 2025-04-07 NOTE — PROGRESS NOTES
Care Management Follow Up    Length of Stay (days): 5    Expected Discharge Date: 04/04/2025     Concerns to be Addressed: all concerns addressed in this encounter     Patient plan of care discussed at interdisciplinary rounds: Yes    Anticipated Discharge Disposition: Skilled Nursing Facility    [address] [address]  [place], admissions  Phone: [phone]  Fax: [phone]     Anticipated Discharge Services: Transportation Services  Anticipated Discharge DME: None    Patient/family educated on Medicare website which has current facility and service quality ratings: yes  Education Provided on the Discharge Plan: Yes  Patient/Family in Agreement with the Plan: yes    Referrals Placed by CM/SW:    Private pay costs discussed: Not applicable    Discussed  Partnership in Safe Discharge Planning  document with patient/family: No     Handoff Completed: No, handoff not indicated or clinically appropriate    Additional Information:  Patient's discharge to TCU today has been cancelled. Patient no longer medically stable to discharge to [facility] today. Message left for admissions. Anticipate being medically ready in the next 1-2 days. RN[facility] will continue to follow for discharge placement.      Next Steps:   Patient needs to be medically stable to discharge to TCU.    Nuzhat Bajwa RN, BSN  Care Coordinator, [facility]  Phone [phone]

## 2025-04-07 NOTE — PROGRESS NOTES
[facility]    Medicine Progress Note - Hospitalist Service, GOLD TEAM 16    Date of Admission:  4/1/2025    Assessment & Plan   89 year old female admitted on 4/1/2025. She has a history of HTN, CKD4, CAD s/p PCI, Afib s/p Watchman (8/2024) on Plavix, recurrent GIB, h/o DVT in 2010. She is admitted for severe worsening pain, particularly in the right knee, back, hip, and foot c/f trauma vs infectious/septic vs inflammatory cause of pain. She requires admission for pain management, PT/OT assessment, and further evaluation and management. Patient had a right knee arthrocentesis that showed calcium pyrophospate crystals. Cultures remained negative. Patient was started on steroids and she had a significant improvement.   PT / OT recommended TCU.         4/7/25    BP and HR elevated yesterday so lopressor increased   Spoke with bedside RN and care team   Patient reluctant to go to TCU today   Discharge planing for tomorrow     1600   HR and BP elevated after PT   Returned to normal soon after   CTM .  Lopressor increased last evening so will continue same for now        Right knee pain and effusion, inability to bear weight c/f septic joint  Leukocytosis + elevated inflammatory markers  Recent fall with significant trauma + generalized weakness  History of osteoporosis and osteoarthritis  Degenerative disc disease + neural foraminal stenosis at L4-L5, L5-S1  Synovial analysis notable for calcium pyrophospate crystals. Acute pseudogout involving multiple joints.   off ABX.   -Continue on Prednisone taper.   -CRP trending down.   -Leucocytosis resolved.       Hypertension, poorly controlled + Hyperlipidemia  CAD s/p PCI (multiple stents, most recent 2013)  HFpEF (60-65%), compensated  Afib s/p Watchman only on Plavix  History of complete heart block s/p PPM placement (2007)  History of DVT with infrarenal IVC filter  Patient with significant heart history including multiple  episodes of stenting, cardiac pacemaker placement due to history of complete heart block, and Afib with Watchman placement due to inability to tolerate long-term AC, now only on Plavix. Recent MICHELL in 10/2024 showed LVEF of 60-65%, moderate mitral and tricuspid valve regurgitation. On arrival to the ED, patient with significant hypertension and SBPs in the 200s, Afib with HR in the 160s (RVR) but asymptomatic including no chest pain, shortness of breath, palpitations. She was given diltiazem IV and then her home medications with improvement in rates and BP (also likely some component of pain control with improvement).   - Continue PTA meds: Amlodipine 5 mg daily, Imdur 90 mg daily, Losartan 25 mg BID, metoprolol tartrate 50 mg BID, rosuvastatin  - Continue Plavix, does not appear to be on ASA currently (was d/c'd 11/2024 per Cards)       SHAJI on CKD Stage 4  History of hypokalemia  Secondary renal hyperparathyroidism  - Considered to be prerenal. Completed IVF       Normocytic anemia of chronic disease, chronic thrombocytopenia: Continue PTA vitamin B12 and vitamin D3; follow-up outpatient (has received BREANNA and venofer in the past)  Gout: PTA allopurinol  GERD: PTA omeprazole  KEVYN: CPAP at night/while asleep; continuous pulse ox  Asthma/COPD: Continue PTA Advair          Diet: Combination Diet Regular Diet Adult  Diet    DVT Prophylaxis: PCD . plavix  Bonner Catheter: Not present  Lines: None     Cardiac Monitoring: None  Code Status: Full Code      Clinically Significant Risk Factors          # Hyperchloremia: Highest Cl = 114 mmol/L in last 2 days, will monitor as appropriate              # Hypertension: Noted on problem list  # Chronic heart failure with preserved ejection fraction: heart failure noted on problem list and last echo with EF >50%               # Financial/Environmental Concerns:     # Pacemaker present       Social Drivers of Health    Housing Stability: High Risk (4/2/2025)    Housing Stability      Do you have housing? : No     Are you worried about losing your housing?: No   Tobacco Use: Medium Risk (2/17/2025)    Patient History     Smoking Tobacco Use: Former     Smokeless Tobacco Use: Never          Disposition Plan     Medically Ready for Discharge: Anticipated Tomorrow             Christen Prince MD  Hospitalist Service, GOLD TEAM 16  M [facility]  Securely message with [employer] (more info)  Text page via [facility] Paging/Directory   See signed in provider for up to date coverage information  ______________________________________________________________________    Interval History   Feels bit better today but last evening felt unwell   No cp   No sob   Not needing o2  No emesis or nausea  BP improved     Physical Exam   Vital Signs: Temp: 97.1  F (36.2  C) Temp src: Oral BP: (!) 138/91 Pulse: 112   Resp: 18 SpO2: 97 % O2 Device: None (Room air)    Weight: 150 lbs 0 oz         Alert and oriented . In no acute distress .  Chest ; Breath sounds are equal BL. No respiratory distress noted .  Cardiovascular Exam ; S1 & S2 .  GI ; Abdomen is soft and non tender. BS positive .  Skin ; no jaundice noted.  Psych ; Viv mood & affect.      Medical Decision Making       51 MINUTES SPENT BY ME on the date of service doing chart review, history, exam, documentation & further activities per the note.      Data

## 2025-04-07 NOTE — PLAN OF CARE
"Goal Outcome Evaluation:      Plan of Care Reviewed With: patient    Overall Patient Progress: improvingOverall Patient Progress: improving           VS: BP (!) 134/98 (BP Location: Left arm)   Pulse 111   Temp 97.1  F (36.2  C) (Oral)   Resp 17   Ht 1.676 m (5' 6\")   Wt 68 kg (150 lb)   SpO2 99%   BMI 24.21 kg/m      O2: Stable on RA.    Output: Voids without difficulty   Last BM: 4/7   Activity: SBA with walker   Skin: Scattered bruising, mostly left side. Wound on left hand.    Pain: Managed with scheduled tylenol and PRN PO dilaudid   CMS: A&O x 4, denies N/T   Dressing: None   Diet: Regular diet   LDA: R PIV SL   Equipment: Personal belongings   Plan: Discharge to TCU pending medical stability.    Additional Info:       "

## 2025-04-08 ENCOUNTER — APPOINTMENT (OUTPATIENT)
Dept: PHYSICAL THERAPY | Facility: CLINIC | Age: 89
DRG: 554 | End: 2025-04-08
Payer: COMMERCIAL

## 2025-04-08 ENCOUNTER — APPOINTMENT (OUTPATIENT)
Dept: OCCUPATIONAL THERAPY | Facility: CLINIC | Age: 89
DRG: 554 | End: 2025-04-08
Payer: COMMERCIAL

## 2025-04-08 LAB
ANION GAP SERPL CALCULATED.3IONS-SCNC: 12 MMOL/L (ref 7–15)
BUN SERPL-MCNC: 40 MG/DL (ref 8–23)
CALCIUM SERPL-MCNC: 8.6 MG/DL (ref 8.8–10.4)
CHLORIDE SERPL-SCNC: 110 MMOL/L (ref 98–107)
CREAT SERPL-MCNC: 1.83 MG/DL (ref 0.51–0.95)
EGFRCR SERPLBLD CKD-EPI 2021: 26 ML/MIN/1.73M2
ERYTHROCYTE [DISTWIDTH] IN BLOOD BY AUTOMATED COUNT: 14.5 % (ref 10–15)
GLUCOSE SERPL-MCNC: 100 MG/DL (ref 70–99)
HCO3 SERPL-SCNC: 19 MMOL/L (ref 22–29)
HCT VFR BLD AUTO: 31.1 % (ref 35–47)
HGB BLD-MCNC: 10.1 G/DL (ref 11.7–15.7)
MCH RBC QN AUTO: 30.9 PG (ref 26.5–33)
MCHC RBC AUTO-ENTMCNC: 32.5 G/DL (ref 31.5–36.5)
MCV RBC AUTO: 95 FL (ref 78–100)
PLAT MORPH BLD: ABNORMAL
PLATELET # BLD AUTO: 253 10E3/UL (ref 150–450)
POLYCHROMASIA BLD QL SMEAR: SLIGHT
POTASSIUM SERPL-SCNC: 3.4 MMOL/L (ref 3.4–5.3)
RBC # BLD AUTO: 3.27 10E6/UL (ref 3.8–5.2)
RBC MORPH BLD: ABNORMAL
SODIUM SERPL-SCNC: 141 MMOL/L (ref 135–145)
WBC # BLD AUTO: 8.3 10E3/UL (ref 4–11)

## 2025-04-08 PROCEDURE — 82565 ASSAY OF CREATININE: CPT | Performed by: INTERNAL MEDICINE

## 2025-04-08 PROCEDURE — 97530 THERAPEUTIC ACTIVITIES: CPT | Mod: GO

## 2025-04-08 PROCEDURE — 93005 ELECTROCARDIOGRAM TRACING: CPT

## 2025-04-08 PROCEDURE — 250N000013 HC RX MED GY IP 250 OP 250 PS 637: Performed by: INTERNAL MEDICINE

## 2025-04-08 PROCEDURE — 85027 COMPLETE CBC AUTOMATED: CPT | Performed by: INTERNAL MEDICINE

## 2025-04-08 PROCEDURE — 99233 SBSQ HOSP IP/OBS HIGH 50: CPT | Performed by: INTERNAL MEDICINE

## 2025-04-08 PROCEDURE — 82374 ASSAY BLOOD CARBON DIOXIDE: CPT | Performed by: INTERNAL MEDICINE

## 2025-04-08 PROCEDURE — 36415 COLL VENOUS BLD VENIPUNCTURE: CPT | Performed by: INTERNAL MEDICINE

## 2025-04-08 PROCEDURE — 97116 GAIT TRAINING THERAPY: CPT | Mod: GP

## 2025-04-08 PROCEDURE — 80048 BASIC METABOLIC PNL TOTAL CA: CPT | Performed by: INTERNAL MEDICINE

## 2025-04-08 PROCEDURE — 120N000002 HC R&B MED SURG/OB UMMC

## 2025-04-08 PROCEDURE — 97530 THERAPEUTIC ACTIVITIES: CPT | Mod: GP

## 2025-04-08 PROCEDURE — 97535 SELF CARE MNGMENT TRAINING: CPT | Mod: GO

## 2025-04-08 PROCEDURE — 250N000013 HC RX MED GY IP 250 OP 250 PS 637: Performed by: STUDENT IN AN ORGANIZED HEALTH CARE EDUCATION/TRAINING PROGRAM

## 2025-04-08 PROCEDURE — 250N000012 HC RX MED GY IP 250 OP 636 PS 637: Performed by: INTERNAL MEDICINE

## 2025-04-08 PROCEDURE — 250N000011 HC RX IP 250 OP 636: Mod: JZ | Performed by: INTERNAL MEDICINE

## 2025-04-08 RX ORDER — ISOSORBIDE MONONITRATE 60 MG/1
120 TABLET, EXTENDED RELEASE ORAL DAILY
Status: DISCONTINUED | OUTPATIENT
Start: 2025-04-08 | End: 2025-04-09 | Stop reason: HOSPADM

## 2025-04-08 RX ADMIN — LIDOCAINE 4% 3 PATCH: 40 PATCH TOPICAL at 18:37

## 2025-04-08 RX ADMIN — PREDNISONE 20 MG: 20 TABLET ORAL at 07:57

## 2025-04-08 RX ADMIN — LOSARTAN POTASSIUM 25 MG: 25 TABLET, FILM COATED ORAL at 07:58

## 2025-04-08 RX ADMIN — Medication 25 MCG: at 09:07

## 2025-04-08 RX ADMIN — ACETAMINOPHEN 975 MG: 325 TABLET, FILM COATED ORAL at 05:41

## 2025-04-08 RX ADMIN — POTASSIUM CHLORIDE 20 MEQ: 750 CAPSULE, EXTENDED RELEASE ORAL at 08:00

## 2025-04-08 RX ADMIN — OMEPRAZOLE 40 MG: 20 CAPSULE, DELAYED RELEASE ORAL at 08:01

## 2025-04-08 RX ADMIN — LOSARTAN POTASSIUM 25 MG: 25 TABLET, FILM COATED ORAL at 20:17

## 2025-04-08 RX ADMIN — CLOPIDOGREL BISULFATE 75 MG: 75 TABLET ORAL at 08:01

## 2025-04-08 RX ADMIN — AMLODIPINE BESYLATE 5 MG: 5 TABLET ORAL at 07:57

## 2025-04-08 RX ADMIN — ISOSORBIDE MONONITRATE 120 MG: 60 TABLET, EXTENDED RELEASE ORAL at 09:07

## 2025-04-08 RX ADMIN — HYDROMORPHONE HYDROCHLORIDE 2 MG: 2 TABLET ORAL at 23:52

## 2025-04-08 RX ADMIN — ACETAMINOPHEN 975 MG: 325 TABLET, FILM COATED ORAL at 13:45

## 2025-04-08 RX ADMIN — HYDRALAZINE HYDROCHLORIDE 10 MG: 20 INJECTION INTRAMUSCULAR; INTRAVENOUS at 06:00

## 2025-04-08 RX ADMIN — METOPROLOL TARTRATE 100 MG: 100 TABLET, FILM COATED ORAL at 20:17

## 2025-04-08 RX ADMIN — METOPROLOL TARTRATE 100 MG: 100 TABLET, FILM COATED ORAL at 07:59

## 2025-04-08 RX ADMIN — SODIUM BICARBONATE 650 MG TABLET 650 MG: at 07:59

## 2025-04-08 RX ADMIN — SODIUM BICARBONATE 650 MG TABLET 650 MG: at 20:17

## 2025-04-08 RX ADMIN — Medication 100 MG: at 08:00

## 2025-04-08 RX ADMIN — TIMOLOL MALEATE 1 DROP: 5 SOLUTION/ DROPS OPHTHALMIC at 08:00

## 2025-04-08 RX ADMIN — Medication 1 CAPSULE: at 07:59

## 2025-04-08 RX ADMIN — CYANOCOBALAMIN TAB 500 MCG 500 MCG: 500 TAB at 07:59

## 2025-04-08 RX ADMIN — ACETAMINOPHEN 975 MG: 325 TABLET, FILM COATED ORAL at 20:17

## 2025-04-08 RX ADMIN — Medication 1 CAPSULE: at 20:17

## 2025-04-08 RX ADMIN — FUROSEMIDE 40 MG: 20 TABLET ORAL at 08:02

## 2025-04-08 RX ADMIN — ROSUVASTATIN 10 MG: 10 TABLET, FILM COATED ORAL at 08:00

## 2025-04-08 RX ADMIN — HYDRALAZINE HYDROCHLORIDE 10 MG: 20 INJECTION INTRAMUSCULAR; INTRAVENOUS at 22:20

## 2025-04-08 ASSESSMENT — ACTIVITIES OF DAILY LIVING (ADL)
ADLS_ACUITY_SCORE: 45

## 2025-04-08 NOTE — PLAN OF CARE
"Goal Outcome Evaluation:      Plan of Care Reviewed With: patient    Overall Patient Progress: improvingOverall Patient Progress: improving       VS: BP (!) 131/101 (BP Location: Right arm, Patient Position: Semi-Lezama's, Cuff Size: Adult Regular)   Pulse 105   Temp 97.7  F (36.5  C) (Oral)   Resp 18   Ht 1.676 m (5' 6\")   Wt 68 kg (150 lb)   SpO2 96%   BMI 24.21 kg/m      O2: > 92% on RA, denies SOB and chest pain   Output: Voiding spontaneously and adequately without difficulty    Last BM: 4/7/2025   Activity: SBA with walker and gait belt   Skin: L hand incison, scattered bruising everywhere    Pain: Pain report to back and knee, pain managed with PRN'S   CMS: AO x4, reports numbness and tingling to all extremities    Dressing: None   Diet: Regular   LDA: R PIV SL   Equipment: IV pole, call light, walker, gait belt and personal belongings    Plan: Possible discharge today   Additional Info: Pt has been having high BP's but is asymptomatic   Hydralazine x2 given           "

## 2025-04-08 NOTE — PLAN OF CARE
"Goal Outcome Evaluation:      Plan of Care Reviewed With: patient    Overall Patient Progress: improving    VS: /89 (BP Location: Left arm)   Pulse 106   Temp 98  F (36.7  C) (Oral)   Resp 18   Ht 1.676 m (5' 6\")   Wt 68 kg (150 lb)   SpO2 98%   BMI 24.21 kg/m       O2: RA   Output: Voids spontaneously  without difficulty   Last BM: 4/7/25   Activity: Ax1 (SBA) with walker & GB   Skin: Scattered bruising everywhere  L Hand scab/wound   Pain: Managed with scheduled Tylenol   CMS: A&Ox4   Dressing: N/A   Diet: Reg diet   LDA: R PIV SL   Equipment: IV pole, call light, personal belongings   Plan: Plan of care ongoing. Possible discharge tomorrow.   Additional Info: Tele dc'd today.        "

## 2025-04-08 NOTE — PLAN OF CARE
"Goal Outcome Evaluation:      Plan of Care Reviewed With: patient    Overall Patient Progress: improvingOverall Patient Progress: improving         VS: BP (!) 127/96 (BP Location: Left arm)   Pulse 113   Temp 97.1  F (36.2  C) (Oral)   Resp 17   Ht 1.676 m (5' 6\")   Wt 68 kg (150 lb)   SpO2 96%   BMI 24.21 kg/m      HR and BP elevated after PT   Returned to normal soon after, MD notified, no new order.    O2: >90% on room air.    Output: Voiding spontaneously without difficulties    Last BM: 04/07/25   Activity: SBA x1 with walker and gait belt    Skin: Scattered bruising, L hand surgical incision    Pain: Managed with PRN Dilaudid, lidocaine patches to bilateral lower back and right knee.    CMS: Alert and oriented x4    Dressing: NA   Diet: Regular diet    LDA: PIV saline locked    Equipment: Cardiac monitoring, walker, gait belt, personal belongings, call light within patient reach    Plan: Possible discharge to TCU tomorrow.    Additional Info:  1900: cross cover notified patient elevated BP (165/117), awating response       "

## 2025-04-08 NOTE — PLAN OF CARE
Goal Outcome Evaluation:      Plan of Care Reviewed With: patient    Overall Patient Progress: improvingOverall Patient Progress: improving    Outcome Evaluation: no acute change to pt condition this shift    VS: Tachycardia-hx of a fib,on cont pulse ox   2215 /108-PRN hydralazine given for dpb > 105   O2: O2 WDL on RA. Denies SOB/CP   Output: Continent of B/B   Last BM: 3/7   Activity: SBA GB/walker   Skin: Scattered bruising, scab to L hand   Pain: Back pain managed with scheduled lidocaine patches   CMS: A&Ox4. Denies N/T.    Dressing: none   Diet: regular   LDA: R PIV SL   Equipment: IV pole, personal belongings    Plan: Anticipated discharge tomorrow

## 2025-04-08 NOTE — PROGRESS NOTES
Care Management Follow Up    Length of Stay (days): 6    Expected Discharge Date: 04/09/2025     Concerns to be Addressed: all concerns addressed in this encounter     Patient plan of care discussed at interdisciplinary rounds: Yes    Anticipated Discharge Disposition: Skilled Nursing Facility     [facility]  [address] [place] [address] [place] [address]  Jam[place], beckie  Phone: [phone]  Fax: [phone]        Anticipated Discharge Services: Transportation Services  Anticipated Discharge DME: None    Patient/family educated on Medicare website which has current facility and service quality ratings: yes  Education Provided on the Discharge Plan: Yes  Patient/Family in Agreement with the Plan: yes    Referrals Placed by CM/SW:    Private pay costs discussed: Not applicable    Discussed  Partnership in Safe Discharge Planning  document with patient/family: No     Handoff Completed: No, handoff not indicated or clinically appropriate    Additional Information:  Patient not medically ready for discharge today. Anticipate patient will be medically ready for discharge on 4/9/2025. Gillian Balderramas updated. RN[facility] will continue to follow for TCU placement.    Next Steps:   Discharge to TCU    Nuzhat Bajwa RN, BSN  Care Coordinator, [facility]  Phone [phone]

## 2025-04-08 NOTE — PROGRESS NOTES
[facility]    Medicine Progress Note - Hospitalist Service, GOLD TEAM 16    Date of Admission:  4/1/2025    Assessment & Plan   89 year old female admitted on 4/1/2025. She has a history of HTN, CKD4, CAD s/p PCI, Afib s/p Watchman (8/2024) on Plavix, recurrent GIB, h/o DVT in 2010. She is admitted for severe worsening pain, particularly in the right knee, back, hip, and foot c/f trauma vs infectious/septic vs inflammatory cause of pain. She requires admission for pain management, PT/OT assessment, and further evaluation and management. Patient had a right knee arthrocentesis that showed calcium pyrophospate crystals. Cultures remained negative. Patient was started on steroids and she had a significant improvement.   PT / OT recommended TCU.         4/8/25    Labs have improved   Increase Imdur to 120 mg   Received hydralazine IV times twice   Hold discharge today for management of BP   EKG check . A fib.  Discuss with RN and care team rounds        Right knee pain and effusion, inability to bear weight c/f septic joint  Leukocytosis + elevated inflammatory markers  Recent fall with significant trauma + generalized weakness  History of osteoporosis and osteoarthritis  Degenerative disc disease + neural foraminal stenosis at L4-L5, L5-S1  Synovial analysis notable for calcium pyrophospate crystals. Acute pseudogout involving multiple joints.   off ABX.   -Continue on Prednisone taper.   -CRP trending down.   -Leucocytosis resolved.       Hypertension, poorly controlled + Hyperlipidemia  CAD s/p PCI (multiple stents, most recent 2013)  HFpEF (60-65%), compensated  Afib s/p Watchman only on Plavix  History of complete heart block s/p PPM placement (2007)  History of DVT with infrarenal IVC filter  Patient with significant heart history including multiple episodes of stenting, cardiac pacemaker placement due to history of complete heart block, and Afib with Watchman  placement due to inability to tolerate long-term AC, now only on Plavix. Recent MICHELL in 10/2024 showed LVEF of 60-65%, moderate mitral and tricuspid valve regurgitation. On arrival to the ED, patient with significant hypertension and SBPs in the 200s, Afib with HR in the 160s (RVR) but asymptomatic including no chest pain, shortness of breath, palpitations. She was given diltiazem IV and then her home medications with improvement in rates and BP (also likely some component of pain control with improvement).   - Continue PTA meds: Amlodipine 5 mg daily, Imdur 90 mg daily, Losartan 25 mg BID, metoprolol tartrate 50 mg BID, rosuvastatin  - Continue Plavix, does not appear to be on ASA currently (was d/c'd 11/2024 per Cards)       SHAJI on CKD Stage 4  History of hypokalemia  Secondary renal hyperparathyroidism  - Considered to be prerenal. Completed IVF       Normocytic anemia of chronic disease, chronic thrombocytopenia: Continue PTA vitamin B12 and vitamin D3; follow-up outpatient (has received BREANNA and venofer in the past)  Gout: PTA allopurinol  GERD: PTA omeprazole  KEVYN: CPAP at night/while asleep; continuous pulse ox  Asthma/COPD: Continue PTA Advair          Diet: Combination Diet Regular Diet Adult  Diet    DVT Prophylaxis: PCD.   Bonner Catheter: Not present  Lines: None     Cardiac Monitoring: None  Code Status: Full Code      Clinically Significant Risk Factors          # Hyperchloremia: Highest Cl = 110 mmol/L in last 2 days, will monitor as appropriate              # Hypertension: Noted on problem list  # Chronic heart failure with preserved ejection fraction: heart failure noted on problem list and last echo with EF >50%               # Financial/Environmental Concerns:     # Pacemaker present       Social Drivers of Health    Housing Stability: High Risk (4/2/2025)    Housing Stability     Do you have housing? : No     Are you worried about losing your housing?: No   Tobacco Use: Medium Risk (2/17/2025)     Patient History     Smoking Tobacco Use: Former     Smokeless Tobacco Use: Never          Disposition Plan     Medically Ready for Discharge: Anticipated Tomorrow             Christen Prince MD  Hospitalist Service, GOLD TEAM 16  [facility]  Securely message with [employer] (more info)  Text page via [employer] Paging/Directory   See signed in provider for up to date coverage information  ______________________________________________________________________    Interval History   Feels better today   No cp   No sob   No dizzy ness   Pain is controlled              Physical Exam   Vital Signs: Temp: 98  F (36.7  C) Temp src: Oral BP: (!) 143/94 Pulse: 107   Resp: 16 SpO2: 97 % O2 Device: None (Room air)    Weight: 150 lbs 0 oz         Alert and oriented . In no acute distress .  Chest ; Breath sounds are equal BL. No respiratory distress noted .  Cardiovascular Exam ; S1 & S2 .  GI ; Abdomen is soft and non tender. BS positive .  Skin ; no jaundice noted.  Ext ; edema mild     Medical Decision Making       51 MINUTES SPENT BY ME on the date of service doing chart review, history, exam, documentation & further activities per the note.      Data     I have personally reviewed the following data over the past 24 hrs:    8.3  \   10.1 (L)   / 253     141 110 (H) 40.0 (H) /  100 (H)   3.4 19 (L) 1.83 (H) \

## 2025-04-09 ENCOUNTER — MEDICAL CORRESPONDENCE (OUTPATIENT)
Dept: HEALTH INFORMATION MANAGEMENT | Facility: CLINIC | Age: 89
End: 2025-04-09

## 2025-04-09 VITALS
BODY MASS INDEX: 24.11 KG/M2 | DIASTOLIC BLOOD PRESSURE: 88 MMHG | OXYGEN SATURATION: 100 % | HEIGHT: 66 IN | SYSTOLIC BLOOD PRESSURE: 118 MMHG | HEART RATE: 82 BPM | RESPIRATION RATE: 14 BRPM | TEMPERATURE: 98.1 F | WEIGHT: 150 LBS

## 2025-04-09 LAB
ATRIAL RATE - MUSE: 98 BPM
DIASTOLIC BLOOD PRESSURE - MUSE: NORMAL MMHG
INTERPRETATION ECG - MUSE: NORMAL
P AXIS - MUSE: NORMAL DEGREES
PR INTERVAL - MUSE: NORMAL MS
QRS DURATION - MUSE: 94 MS
QT - MUSE: 296 MS
QTC - MUSE: 407 MS
R AXIS - MUSE: 21 DEGREES
SYSTOLIC BLOOD PRESSURE - MUSE: NORMAL MMHG
T AXIS - MUSE: 14 DEGREES
VENTRICULAR RATE- MUSE: 114 BPM

## 2025-04-09 PROCEDURE — 250N000013 HC RX MED GY IP 250 OP 250 PS 637: Performed by: INTERNAL MEDICINE

## 2025-04-09 PROCEDURE — 99239 HOSP IP/OBS DSCHRG MGMT >30: CPT | Performed by: INTERNAL MEDICINE

## 2025-04-09 PROCEDURE — 250N000012 HC RX MED GY IP 250 OP 636 PS 637: Performed by: INTERNAL MEDICINE

## 2025-04-09 PROCEDURE — 250N000013 HC RX MED GY IP 250 OP 250 PS 637: Performed by: STUDENT IN AN ORGANIZED HEALTH CARE EDUCATION/TRAINING PROGRAM

## 2025-04-09 RX ORDER — METOPROLOL TARTRATE 25 MG/1
100 TABLET, FILM COATED ORAL 2 TIMES DAILY
Qty: 180 TABLET | Refills: 3 | Status: SHIPPED | OUTPATIENT
Start: 2025-04-09

## 2025-04-09 RX ORDER — ISOSORBIDE MONONITRATE 30 MG/1
120 TABLET, EXTENDED RELEASE ORAL DAILY
DISCHARGE
Start: 2025-04-09

## 2025-04-09 RX ADMIN — Medication 100 MG: at 08:32

## 2025-04-09 RX ADMIN — OMEPRAZOLE 40 MG: 20 CAPSULE, DELAYED RELEASE ORAL at 08:34

## 2025-04-09 RX ADMIN — ROSUVASTATIN 10 MG: 10 TABLET, FILM COATED ORAL at 08:33

## 2025-04-09 RX ADMIN — CLOPIDOGREL BISULFATE 75 MG: 75 TABLET ORAL at 08:36

## 2025-04-09 RX ADMIN — PREDNISONE 10 MG: 10 TABLET ORAL at 08:35

## 2025-04-09 RX ADMIN — METOPROLOL TARTRATE 100 MG: 100 TABLET, FILM COATED ORAL at 08:32

## 2025-04-09 RX ADMIN — LOSARTAN POTASSIUM 25 MG: 25 TABLET, FILM COATED ORAL at 08:35

## 2025-04-09 RX ADMIN — Medication 1 CAPSULE: at 08:34

## 2025-04-09 RX ADMIN — FUROSEMIDE 40 MG: 20 TABLET ORAL at 08:35

## 2025-04-09 RX ADMIN — ACETAMINOPHEN 975 MG: 325 TABLET, FILM COATED ORAL at 06:01

## 2025-04-09 RX ADMIN — TIMOLOL MALEATE 1 DROP: 5 SOLUTION/ DROPS OPHTHALMIC at 08:37

## 2025-04-09 RX ADMIN — AMLODIPINE BESYLATE 5 MG: 5 TABLET ORAL at 08:36

## 2025-04-09 RX ADMIN — SODIUM BICARBONATE 650 MG TABLET 650 MG: at 08:31

## 2025-04-09 RX ADMIN — ISOSORBIDE MONONITRATE 120 MG: 60 TABLET, EXTENDED RELEASE ORAL at 08:32

## 2025-04-09 RX ADMIN — CYANOCOBALAMIN TAB 500 MCG 500 MCG: 500 TAB at 08:32

## 2025-04-09 RX ADMIN — POTASSIUM CHLORIDE 20 MEQ: 750 CAPSULE, EXTENDED RELEASE ORAL at 08:33

## 2025-04-09 ASSESSMENT — ACTIVITIES OF DAILY LIVING (ADL)
ADLS_ACUITY_SCORE: 45

## 2025-04-09 NOTE — PLAN OF CARE
"Goal Outcome Evaluation:      Plan of Care Reviewed With: patient    Overall Patient Progress: improvingOverall Patient Progress: improving       VS: BP (!) 144/108 (BP Location: Left arm)   Pulse 82   Temp 97.9  F (36.6  C) (Oral)   Resp 18   Ht 1.676 m (5' 6\")   Wt 68 kg (150 lb)   SpO2 100%   BMI 24.21 kg/m      O2: O2 WDL on RA. Denies SOB/CP   Output: Continent of B/B   Last BM: 4/7   Activity: SBA GB/walker   Skin: Scattered bruising, scab to L hand   Pain: Back pain managed with scheduled lidocaine patches and tylenol   CMS: A&Ox4. Denies N/T.    Dressing: none   Diet: regular   LDA: R PIV SL   Equipment: IV pole, personal belongings    Plan: Pt will likely discharge today        "

## 2025-04-09 NOTE — DISCHARGE SUMMARY
[facility]  Hospitalist Discharge Summary      Date of Admission:  4/1/2025  Date of Discharge:  4/9/2025  Discharging Provider: Christen Prince MD  Discharge Service: Hospitalist Service, GOLD TEAM 16    Discharge Diagnoses   See below    Clinically Significant Risk Factors          Follow-ups Needed After Discharge   Follow-up Appointments       Follow Up and recommended labs and tests      Follow up with primary care provider in 1 weeks.  The following labs/tests are recommended: CBC and BMP.                Unresulted Labs Ordered in the Past 30 Days of this Admission       No orders found from 3/2/2025 to 4/2/2025.            Discharge Disposition   Discharged to rehabilitation facility  Condition at discharge: [facility]    Hospital Course   89 year old female admitted on 4/1/2025. She has a history of HTN, CKD4, CAD s/p PCI, Afib s/p Watchman (8/2024) on Plavix, recurrent GIB, h/o DVT in 2010. She was admitted for severe worsening pain, particularly in the right knee, back, hip, and foot c/f trauma vs infectious/septic vs inflammatory cause of pain. She required admission for pain management, PT/OT assessment, and further evaluation and management. Patient had a right knee arthrocentesis that showed calcium pyrophospate crystals. Cultures remained negative. Patient was started on steroids and she had a significant improvement.   PT / OT recommended TCU.     She has accelerated hypertension and Afib so her lopressor was increased to 5 mg BID and in addition Imdur was increased to 120 mg daily. She tolerated these medications with improvement in BP and HR .    On day of discharge plan was discussed with bedside RN and care coordination rounds . All questions were answered to the best of my ability . Patient was appreciative and understanding of the plan                    Right knee pain and effusion, inability to bear weight c/f septic joint  Leukocytosis + elevated  inflammatory markers  Recent fall with significant trauma + generalized weakness  History of osteoporosis and osteoarthritis  Degenerative disc disease + neural foraminal stenosis at L4-L5, L5-S1  Synovial analysis notable for calcium pyrophospate crystals. Acute pseudogout involving multiple joints.   off ABX.   -Continue on Prednisone taper.         Hypertension, poorly controlled + Hyperlipidemia  CAD s/p PCI (multiple stents, most recent 2013)  HFpEF (60-65%), compensated  Afib s/p Watchman only on Plavix  History of complete heart block s/p PPM placement (2007)  History of DVT with infrarenal IVC filter  Patient with significant heart history including multiple episodes of stenting, cardiac pacemaker placement due to history of complete heart block, and Afib with Watchman placement due to inability to tolerate long-term AC, now only on Plavix. Recent MICHELL in 10/2024 showed LVEF of 60-65%, moderate mitral and tricuspid valve regurgitation. On arrival to the ED, patient with significant hypertension and SBPs in the 200s, Afib with HR in the 160s (RVR) but asymptomatic including no chest pain, shortness of breath, palpitations. She was given diltiazem IV and then her home medications with improvement in rates and BP (also likely some component of pain control with improvement).   - Continue PTA meds: Amlodipine 5 mg daily, Imdur 90 mg daily, Losartan 25 mg BID, metoprolol tartrate 50 mg BID, rosuvastatin  - Continue Plavix, does not appear to be on ASA currently (was d/c'd 11/2024 per Cards)       SHAJI on CKD Stage 4  History of hypokalemia  Secondary renal hyperparathyroidism  - Considered to be prerenal. Completed IVF       Normocytic anemia of chronic disease, chronic thrombocytopenia: Continue PTA vitamin B12 and vitamin D3; follow-up outpatient (has received BREANNA and venofer in the past)  Gout: PTA allopurinol  GERD: PTA omeprazole  KEVYN: CPAP at night/while asleep; continuous pulse ox  Asthma/COPD: Continue PTA  Advair    Consultations This Hospital Stay   SOCIAL WORK IP CONSULT  NURSING TO CONSULT FOR VASCULAR ACCESS CARE IP CONSULT  CARE MANAGEMENT / SOCIAL WORK IP CONSULT  PHARMACY TO DOSE VANCO  PHYSICAL THERAPY ADULT IP CONSULT  OCCUPATIONAL THERAPY ADULT IP CONSULT  PHYSICAL THERAPY ADULT IP CONSULT  OCCUPATIONAL THERAPY ADULT IP CONSULT  NURSING TO CONSULT FOR VASCULAR ACCESS CARE IP CONSULT  SPIRITUAL HEALTH SERVICES IP CONSULT    Code Status   Full Code    Time Spent on this Encounter   I, Christen Prince MD, personally saw the patient today and spent greater than 30 minutes discharging this patient.       Christen Prince MD  [facility]  [address] [place] [id number]  Phone: [phone]  Fax: [phone]  ______________________________________________________________________    Physical Exam   Vital Signs: Temp: 98.1  F (36.7  C) Temp src: Oral BP: 118/88 Pulse: 82   Resp: 14 SpO2: 100 % O2 Device: None (Room air)    Weight: 150 lbs 0 oz       Alert and oriented . In no acute distress .  Chest ; Breath sounds are equal BL. No respiratory distress noted .  Cardiovascular Exam ; S1 & S2 .  GI ; Abdomen is soft and non tender. BS positive .  Skin ; no jaundice noted.  Psych ; Viv mood & affect.         Primary Care Physician   Pedro Gomez    Discharge Orders      Primary Care - Care Coordination Referral      Mantoux instructions    Give two-step Mantoux (PPD) Per Facility Policy Yes     Follow Up and recommended labs and tests    Follow up with primary care provider in 1 weeks.  The following labs/tests are recommended: CBC and BMP.     Reason for your hospital stay    89 year old female admitted on 4/1/2025. She has a history of HTN, CKD4, CAD s/p PCI, Afib s/p Watchman (8/2024) on Plavix, recurrent GIB, h/o DVT in 2010. She is admitted for severe worsening pain, particularly in the right knee, back, hip, and foot c/f trauma vs infectious/septic vs  inflammatory (gout, pseudogout) cause of pain. She requires admission for pain management, PT/OT assessment, and further evaluation and management.     Activity - Up ad court     Physical Therapy Adult Consult    Evaluate and treat as clinically indicated.    Reason:  Weakness and deconditioning     Occupational Therapy Adult Consult    Evaluate and treat as clinically indicated.    Reason:  Weakness and deconditioning     Fall precautions     Diet    Follow this diet upon discharge: Current Diet:Orders Placed This Encounter      Combination Diet Regular Diet Adult       Significant Results and Procedures   Most Recent 3 CBC's:  Recent Labs   Lab Test 04/08/25  0632 04/04/25  1014 04/03/25  0658   WBC 8.3 8.0 8.0   HGB 10.1* 9.8* 11.1*   MCV 95 94 93    149* 88*     Most Recent 3 BMP's:  Recent Labs   Lab Test 04/08/25  0632 04/06/25  0619 04/05/25  0606    145 140   POTASSIUM 3.4 3.8 4.2   CHLORIDE 110* 114* 105   CO2 19* 17* 18*   BUN 40.0* 51.8* 71.4*   CR 1.83* 2.11* 3.03*   ANIONGAP 12 14 17*   ALEXANDRO 8.6* 8.3* 8.1*   * 100* 103*       Discharge Medications   Current Discharge Medication List        START taking these medications    Details   !! predniSONE (DELTASONE) 10 MG tablet Take 1 tablet (10 mg) by mouth daily for 2 days.  Qty: 2 tablet, Refills: 0    Associated Diagnoses: Pseudogout of right knee      !! predniSONE (DELTASONE) 20 MG tablet Take 1 tablet (20 mg) by mouth daily for 2 days.  Qty: 2 tablet, Refills: 0    Associated Diagnoses: Pseudogout of right knee       !! - Potential duplicate medications found. Please discuss with provider.        CONTINUE these medications which have CHANGED    Details   isosorbide mononitrate (IMDUR) 30 MG 24 hr tablet Take 4 tablets (120 mg) by mouth daily.    Associated Diagnoses: Coronary artery disease of native artery of native heart with stable angina pectoris      metoprolol tartrate (LOPRESSOR) 25 MG tablet Take 4 tablets (100 mg) by mouth 2  times daily.  Qty: 180 tablet, Refills: 3    Associated Diagnoses: Coronary artery disease of native artery of native heart with stable angina pectoris           CONTINUE these medications which have NOT CHANGED    Details   acetaminophen (TYLENOL) 325 MG tablet Take 975 mg by mouth nightly as needed for pain      allopurinol (ZYLOPRIM) 100 MG tablet TAKE 1 TABLET BY MOUTH ONCE DAILY *NEEDS TO HAVE LABS FOR FURTHER REFILLS*  Qty: 90 tablet, Refills: 0    Associated Diagnoses: Idiopathic gout, unspecified chronicity, unspecified site      amLODIPine (NORVASC) 5 MG tablet Take 1 tablet (5 mg) by mouth daily.  Qty: 90 tablet, Refills: 3    Associated Diagnoses: Essential hypertension      clopidogrel (PLAVIX) 75 MG tablet Take 1 tablet (75 mg) by mouth daily.  Qty: 90 tablet, Refills: 3    Associated Diagnoses: S/P coronary artery stent placement      furosemide (LASIX) 40 MG tablet Take 1 tablet (40 mg) by mouth daily May take an additional 20 mg at noon as needed for swelling.  Qty: 90 tablet, Refills: 2    Comments: Please take an additional 20 mgs for 3 days at noon. After that, may take additional 20 mg at noon as needed for swelling  Associated Diagnoses: Chronic systolic heart failure (H)      lidocaine (LIDODERM) 5 % patch Place 1 patch over 12 hours onto the skin every 24 hours. To prevent lidocaine toxicity, patient should be patch free for 12 hrs daily.  Qty: 10 patch, Refills: 0      losartan (COZAAR) 25 MG tablet Take 1 tablet (25 mg) by mouth 2 times daily.  Qty: 180 tablet, Refills: 3    Associated Diagnoses: CKD (chronic kidney disease) stage 4, GFR 15-29 ml/min (H)      methocarbamol (ROBAXIN) 500 MG tablet Take 1 tablet (500 mg) by mouth every 6 hours as needed for muscle spasms.  Qty: 30 tablet, Refills: 5    Comments: Please let the patient know when it is ready to . Thank you.  Associated Diagnoses: Acute post-operative pain      Multiple Vitamins-Minerals (PRESERVISION AREDS 2+MULTI VIT PO)  Take 1 capsule by mouth 2 times daily      omeprazole (PRILOSEC) 40 MG DR capsule Take 1 capsule (40 mg) by mouth daily.  Qty: 90 capsule, Refills: 3    Associated Diagnoses: Gastroesophageal reflux disease without esophagitis      potassium chloride ER (K-TAB) 20 MEQ CR tablet Take 1 tablet (20 mEq) by mouth daily  Qty: 90 tablet, Refills: 3    Associated Diagnoses: Hypokalemia      psyllium (METAMUCIL/KONSYL) Packet Take 1 packet by mouth daily as needed for constipation.      rosuvastatin (CRESTOR) 20 MG tablet Take 1 tablet by mouth once daily  Qty: 90 tablet, Refills: 0    Associated Diagnoses: Coronary artery disease of native artery of native heart with stable angina pectoris      sodium bicarbonate 650 MG tablet Take 1 tablet (650 mg) by mouth 2 times daily  Qty: 60 tablet, Refills: 11    Associated Diagnoses: Metabolic acidosis      timolol maleate (TIMOPTIC) 0.5 % ophthalmic solution INSTILL 1 DROP INTO EACH EYE ONCE DAILY IN THE MORNING      vitamin B-12 (CYANOCOBALAMIN) 500 MCG tablet Take 1 tablet by mouth daily      vitamin D3 25 mcg (1000 units) tablet Take 1 tablet (25 mcg) by mouth every other day  Qty: 90 tablet, Refills: 3    Associated Diagnoses: Secondary renal hyperparathyroidism; CKD (chronic kidney disease) stage 4, GFR 15-29 ml/min (H)           STOP taking these medications       traMADol (ULTRAM) 50 MG tablet Comments:   Reason for Stopping:             Allergies   Allergies   Allergen Reactions    Shrimp Swelling    Codeine Sulfate Itching    Indomethacin      Other Reaction(s): Not available    Levofloxacin Hemihydrate      Other Reaction(s): Not available    Morphine Sulfate      Other Reaction(s): Not available    Oxycodone Other (See Comments)    Shrimp Extract Swelling    Sulfamethoxazole W/Trimethoprim      Other Reaction(s): Not available    Chlorthalidone Nausea and Vomiting     Other Reaction(s): Not available    Clarithromycin      Other Reaction(s): Not available    Clonidine   "    Other Reaction(s): Not available    Darvon [Propoxyphene Hcl]     Gabapentin Confusion and Fatigue     \"felt drunk\"    Other Reaction(s): Not available    Hydromorphone Hallucination and Visual Disturbance     Tolerated in April 2024 hospital admissions    Other Reaction(s): Not available    Indomethacin     Percocet [Oxycodone-Acetaminophen] Hallucination    Pregabalin Fatigue and Itching    Simvastatin Palpitations and Cramps     Muscle weakness, leg cramping    Spironolactone      Dehydrated    Other Reaction(s): Not available    Terazosin      Other Reaction(s): Not available     "

## 2025-04-09 NOTE — PROGRESS NOTES
Care Management Discharge Note    Discharge Date: 04/09/2025       Discharge Disposition: Skilled Nursing Facility    Discharge Services: Transportation Services    Discharge DME: None    Discharge Transportation: [facility] wheelchair transport.  time between 1389-1597.    Private pay costs discussed: transportation costs    Does the patient's insurance plan have a 3 day qualifying hospital stay waiver?  No    PAS Confirmation Code: [id number]  Patient/family educated on Medicare website which has current facility and service quality ratings: yes    Education Provided on the Discharge Plan: Yes  Persons Notified of Discharge Plans: patient  Patient/Family in Agreement with the Plan: yes    Handoff Referral Completed: Yes, [facility] PCP: Internal handoff referral completed    Additional Information:  Plan for patient to discharge to [facility] today. Transport arranged by [facility] for a 1563-2876  window. All orders faxed to [facility]. Patient is in agreement with the plan. No further discharge concerns at this time. RNCC will sign off. Please re-consult CM for any additional discharge needs.    Nuzhat Bajwa RN, BSN  Care Coordinator, 5 Ortho  Phone [phone]

## 2025-04-09 NOTE — PLAN OF CARE
Physical Therapy Discharge Summary    Reason for therapy discharge:    Discharged to transitional care facility.    Progress towards therapy goal(s). See goals on Care Plan in [facility] electronic health record for goal details.  Goals partially met.  Barriers to achieving goals:   discharge from facility.    Therapy recommendation(s):    Continued therapy is recommended.  Rationale/Recommendations:   Recommending TCU stay in order to progress functional mobility and overall strength.

## 2025-04-09 NOTE — PLAN OF CARE
Occupational Therapy Discharge Summary    Reason for therapy discharge:    Discharged to transitional care facility.    Progress towards therapy goal(s). See goals on Care Plan in [facility] electronic health record for goal details.  Goals partially met.  Barriers to achieving goals:   discharge from facility.    Therapy recommendation(s):    Continued therapy is recommended.  Rationale/Recommendations:  to maximize safety and IND with functional mobility and ADLs/IADLs.

## 2025-04-09 NOTE — DISCHARGE SUMMARY
DISCHARGE SUMMARY    Pt discharging to: TCU  Transportation: Wheelchair  AVS given and discussed: Pt was given AVS and pt states understanding of content. Pt has no further questions.     Medications given: Yes, discussed. No further questions.     Belongings returned: Yes, ensured all belongings packed and sent with pt. No items in security.     Comments: Escorted safely to elevators. Pt left at 1208

## 2025-04-09 NOTE — PLAN OF CARE
"Goal Outcome Evaluation:      Plan of Care Reviewed With: patient    Overall Patient Progress: improving    VS: /88   Pulse 82   Temp 98.1  F (36.7  C) (Oral)   Resp 14   Ht 1.676 m (5' 6\")   Wt 68 kg (150 lb)   SpO2 100%   BMI 24.21 kg/m       O2: RA   Output: Voids spontaneously without difficulty   Last BM: 4/7/25   Activity: SBA with walker and gait belt   Skin: Scattered bruising to all extremities and face. L hand scab.   Pain: Managed with    CMS: A&Ox4; baseline n/t   Dressing: N/A   Diet: Regular, thin liquids, pills whole   LDA: R PIV SL   Equipment: IV pole, call light, personal belongings   Plan: Discharge to TCU today.   Additional Info: Discharge to:  [facility]  [address] [place] [address] [place], [place] [address] [place], [place] [address], [facility]  Phone: [phone]  Fax: [phone]       "

## 2025-04-09 NOTE — PROGRESS NOTES
CHW called [facility] (P: [phone]) to schedule discharge ride via wheelchair for 04/09/25  with pick-up window of 11:36 to 12:21 PM. Pickup from [facility] WB Unit  [address] Ortho [place] [address]  Jamila, beckie  Phone: [phone]  Fax: [phone]. This information was passed to unit RNCC and bedside RN. Call Celina at [facility] to advise time of Pt arrival time.                          Hilary Mcnair  Inpatient CHW  [facility] 5 Ortho/8A Med-Surg/10-ICU/WB-ED  [phone]

## 2025-04-17 ENCOUNTER — ANCILLARY PROCEDURE (OUTPATIENT)
Dept: CARDIOLOGY | Facility: CLINIC | Age: 89
End: 2025-04-17
Attending: INTERNAL MEDICINE
Payer: COMMERCIAL

## 2025-04-17 ENCOUNTER — PATIENT OUTREACH (OUTPATIENT)
Dept: CARE COORDINATION | Facility: CLINIC | Age: 89
End: 2025-04-17
Payer: COMMERCIAL

## 2025-04-17 DIAGNOSIS — I49.5 SICK SINUS SYNDROME (H): ICD-10-CM

## 2025-04-17 PROCEDURE — 93294 REM INTERROG EVL PM/LDLS PM: CPT | Performed by: INTERNAL MEDICINE

## 2025-04-17 NOTE — PROGRESS NOTES
Anemia Management Note - Follow Up      SUBJECTIVE/OBJECTIVE:    Referred by Dr. Marzena Martin on 2024  Primary Diagnosis: Anemia in Chronic Kidney Disease (N18.4, D63.1)     Secondary Diagnosis: Chronic Kidney Disease, Stage 4 (N18.4)   Hgb goal range: 9-10     Epo/Darbo: TBD; treat with IV Iron per clinic note on 24.   Iron regimen: Treat with IV Iron; Oral Iron stopped  *Note form 24; Recommended to stop oral iron and start IV iron due to potential for GI bleeding and difficulty in knowing if black stools are from iron or bleeding.       Labs : 2025  RX/TX plans : TBD     Recent BREANNA use, transfusion, IV iron: Venofer and PRBCs  Hx of CAD, NTEMI (), HTN, Pacemaker, AFib, Anticoagulated.     Contact: Consent to Communicate scanned on 2019.         Latest Ref Rng & Units 2024 2025 2025 2025 4/3/2025 2025 2025   Anemia   Hemoglobin 11.7 - 15.7 g/dL 10.2  10.5  11.7  11.8  11.1  9.8  10.1    TSAT 15 - 46 % 19  14         Ferritin 11 - 328 ng/mL 558  459           BP Readings from Last 3 Encounters:   25 118/88   25 (!) 160/70   25 (!) 146/75     Wt Readings from Last 2 Encounters:   25 68 kg (150 lb)   25 63.5 kg (140 lb)         ASSESSMENT:    Hgb:at goal - continue to monitor  TSat: Due for labs Ferritin: Due for labs        PLAN:  Tessa was recently admitted. Discharged to [facility] ([facility]) on 25. Will check back in with pt in 2 weeks.      Orders needed to be renewed (for next follow-up date) in EPIC: None    Iron labs due:  DUE    Plan discussed with:  No call, chart review       NEXT FOLLOW-UP DATE:  25    Barbara Dimas RN   Anemia Services  [facility]  [email or web address]   Office : [phone]  Fax: [phone]

## 2025-04-18 ENCOUNTER — TELEPHONE (OUTPATIENT)
Dept: FAMILY MEDICINE | Facility: CLINIC | Age: 89
End: 2025-04-18
Payer: COMMERCIAL

## 2025-04-18 NOTE — TELEPHONE ENCOUNTER
[facility] Call Center    Phone Message    May a detailed message be left on voicemail: yes     Reason for Call: Order(s): Home Care Orders: Other: delay order to start of care until 04/22/2025

## 2025-04-21 ENCOUNTER — PATIENT OUTREACH (OUTPATIENT)
Dept: NEPHROLOGY | Facility: CLINIC | Age: 89
End: 2025-04-21
Payer: COMMERCIAL

## 2025-04-21 ENCOUNTER — PATIENT OUTREACH (OUTPATIENT)
Dept: CARE COORDINATION | Facility: CLINIC | Age: 89
End: 2025-04-21
Payer: COMMERCIAL

## 2025-04-21 NOTE — CONFIDENTIAL NOTE
Nephrology Note: RN CC Chart Review    REASON FOR ENCOUNTER:     Chart reviewed by nephrology RN CC                          SITUATION/BACKROUND:     Last nephrology visit:    Last Renal Panel:  Sodium   Date Value Ref Range Status   04/08/2025 141 135 - 145 mmol/L Final   08/26/2020 141 133 - 144 mmol/L Final     Potassium   Date Value Ref Range Status   04/08/2025 3.4 3.4 - 5.3 mmol/L Final   11/21/2022 4.8 3.4 - 5.3 mmol/L Final   08/26/2020 4.4 3.4 - 5.3 mmol/L Final     Potassium POCT   Date Value Ref Range Status   08/08/2024 3.5 3.4 - 5.3 mmol/L Final     Comment:     CRITICAL RESULTS NOTED BY CCL and MD     Chloride   Date Value Ref Range Status   04/08/2025 110 (H) 98 - 107 mmol/L Final   11/21/2022 110 (H) 94 - 109 mmol/L Final   08/26/2020 111 (H) 94 - 109 mmol/L Final     Carbon Dioxide   Date Value Ref Range Status   08/26/2020 24 20 - 32 mmol/L Final     Carbon Dioxide (CO2)   Date Value Ref Range Status   04/08/2025 19 (L) 22 - 29 mmol/L Final   11/21/2022 24 20 - 32 mmol/L Final     Anion Gap   Date Value Ref Range Status   04/08/2025 12 7 - 15 mmol/L Final   11/21/2022 6 3 - 14 mmol/L Final   08/26/2020 8 3 - 14 mmol/L Final     Glucose   Date Value Ref Range Status   04/08/2025 100 (H) 70 - 99 mg/dL Final   11/21/2022 79 70 - 99 mg/dL Final   08/26/2020 84 70 - 99 mg/dL Final     GLUCOSE BY METER POCT   Date Value Ref Range Status   08/09/2024 182 (H) 70 - 99 mg/dL Final     Urea Nitrogen   Date Value Ref Range Status   04/08/2025 40.0 (H) 8.0 - 23.0 mg/dL Final   11/21/2022 35 (H) 7 - 30 mg/dL Final   08/26/2020 41 (H) 7 - 30 mg/dL Final     Creatinine   Date Value Ref Range Status   04/08/2025 1.83 (H) 0.51 - 0.95 mg/dL Final   08/26/2020 1.70 (H) 0.52 - 1.04 mg/dL Final     GFR Estimate   Date Value Ref Range Status   04/08/2025 26 (L) >60 mL/min/1.73m2 Final     Comment:     eGFR calculated using 2021 CKD-EPI equation.   08/26/2020 27 (L) >60 mL/min/[1.73_m2] Final     Comment:     Non   American GFR Calc  Starting 12/18/2018, serum creatinine based estimated GFR (eGFR) will be   calculated using the Chronic Kidney Disease Epidemiology Collaboration   (CKD-EPI) equation.       Calcium   Date Value Ref Range Status   04/08/2025 8.6 (L) 8.8 - 10.4 mg/dL Final   08/26/2020 9.0 8.5 - 10.1 mg/dL Final     Phosphorus   Date Value Ref Range Status   02/07/2025 3.6 2.5 - 4.5 mg/dL Final   08/26/2020 3.6 2.5 - 4.5 mg/dL Final     Albumin   Date Value Ref Range Status   04/01/2025 4.0 3.5 - 5.2 g/dL Final   11/21/2022 3.8 3.4 - 5.0 g/dL Final   08/26/2020 3.6 3.4 - 5.0 g/dL Final         Neph Tracking Status:  Neph Tracking Flowsheet Last Filled Values       Kidney Smart CKD Basics Referral --  declined [place]    Patient's Referral Dates Auto Populate Patient's Referral Dates    Anemia Services Referral 6/24/24    MTM Referral 7/8/24    Home Care Referral 5/13/24    Journey Referral 10/3/23              ASSESSMENT/PLAN     Patient to follow up as scheduled at next appointment  Patient to call/X BODYhart message with updates    Upcoming Appointments:  Future Appts Next 180 days       Visit Type Date Time Department    RETURN NEPHROLOGY 7/7/2025  1:30 PM [facility]              CINTHIA MUNGUIA RN

## 2025-04-21 NOTE — TELEPHONE ENCOUNTER
Spoke with Audrey from [email or web address] and gave the following verbal order(s): Home Care Orders: delay order to start of care until 04/22/2025.  Jordyn DUQUE LPN  [facility]

## 2025-04-21 NOTE — PROGRESS NOTES
Clinic Care Coordination Contact  [facility]/Voicemail    Clinical Data: Care Coordinator Outreach    Outreach Documentation Number of Outreach Attempt   4/21/2025  11:45 AM 1       Left message on patient's voicemail with call back information and requested return call.      Plan:  Care Coordinator will try to reach patient again in 1-2 business days.    LUPE CALLE RN on 4/21/2025 at 11:45 AM

## 2025-04-22 ENCOUNTER — DOCUMENTATION ONLY (OUTPATIENT)
Dept: FAMILY MEDICINE | Facility: CLINIC | Age: 89
End: 2025-04-22
Payer: COMMERCIAL

## 2025-04-22 ENCOUNTER — TELEPHONE (OUTPATIENT)
Dept: FAMILY MEDICINE | Facility: CLINIC | Age: 89
End: 2025-04-22
Payer: COMMERCIAL

## 2025-04-22 NOTE — TELEPHONE ENCOUNTER
[facility] Call Center    Phone Message    May a detailed message be left on voicemail: yes     Reason for Call: Other: requesting a delay in care with pt ot and home health aid until 4/23/2025     Action Taken: Message routed to:  [facility] alec    Travel Screening: Not Applicable     Date of Service:

## 2025-04-22 NOTE — PROGRESS NOTES
Type of Form Received: [employer] [id number]    Form Received (Date) 4/22/25   Form Filled out No   Placed in provider folder Yes

## 2025-04-22 NOTE — TELEPHONE ENCOUNTER
Spoke with Tiffanie with [employer] and gave the following verbal orders: requesting a delay in care with pt ot and home health aid until 4/23/2025.  Jordyn DUQUE LPN  [facility]

## 2025-04-23 ENCOUNTER — TELEPHONE (OUTPATIENT)
Dept: CARDIOLOGY | Facility: CLINIC | Age: 89
End: 2025-04-23
Payer: COMMERCIAL

## 2025-04-23 ENCOUNTER — TELEPHONE (OUTPATIENT)
Dept: FAMILY MEDICINE | Facility: CLINIC | Age: 89
End: 2025-04-23
Payer: COMMERCIAL

## 2025-04-23 ENCOUNTER — HOSPITAL ENCOUNTER (OUTPATIENT)
Facility: CLINIC | Age: 89
End: 2025-04-23
Payer: COMMERCIAL

## 2025-04-23 ENCOUNTER — ANCILLARY PROCEDURE (OUTPATIENT)
Dept: CARDIOLOGY | Facility: CLINIC | Age: 89
End: 2025-04-23
Attending: INTERNAL MEDICINE
Payer: COMMERCIAL

## 2025-04-23 ENCOUNTER — MYC MEDICAL ADVICE (OUTPATIENT)
Dept: CARDIOLOGY | Facility: CLINIC | Age: 89
End: 2025-04-23

## 2025-04-23 ENCOUNTER — DOCUMENTATION ONLY (OUTPATIENT)
Dept: FAMILY MEDICINE | Facility: CLINIC | Age: 89
End: 2025-04-23
Payer: COMMERCIAL

## 2025-04-23 DIAGNOSIS — I48.91 ATRIAL FIBRILLATION (H): ICD-10-CM

## 2025-04-23 DIAGNOSIS — I49.5 SICK SINUS SYNDROME (H): ICD-10-CM

## 2025-04-23 DIAGNOSIS — I48.0 PAROXYSMAL ATRIAL FIBRILLATION (H): Primary | ICD-10-CM

## 2025-04-23 DIAGNOSIS — I25.118 CORONARY ARTERY DISEASE OF NATIVE ARTERY OF NATIVE HEART WITH STABLE ANGINA PECTORIS: ICD-10-CM

## 2025-04-23 LAB
MDC_IDC_EPISODE_DTM: NORMAL
MDC_IDC_EPISODE_DURATION: 1406 S
MDC_IDC_EPISODE_DURATION: 2528 S
MDC_IDC_EPISODE_DURATION: 3740 S
MDC_IDC_EPISODE_DURATION: 4992 S
MDC_IDC_EPISODE_DURATION: 5649 S
MDC_IDC_EPISODE_DURATION: 5898 S
MDC_IDC_EPISODE_DURATION: 8340 S
MDC_IDC_EPISODE_DURATION: 8850 S
MDC_IDC_EPISODE_DURATION: 9056 S
MDC_IDC_EPISODE_DURATION: 9687 S
MDC_IDC_EPISODE_DURATION: 9789 S
MDC_IDC_EPISODE_DURATION: NORMAL S
MDC_IDC_EPISODE_ID: 21
MDC_IDC_EPISODE_ID: 22
MDC_IDC_EPISODE_ID: 23
MDC_IDC_EPISODE_ID: 24
MDC_IDC_EPISODE_ID: 25
MDC_IDC_EPISODE_ID: 26
MDC_IDC_EPISODE_ID: 27
MDC_IDC_EPISODE_ID: 28
MDC_IDC_EPISODE_ID: 29
MDC_IDC_EPISODE_ID: 30
MDC_IDC_EPISODE_ID: 31
MDC_IDC_EPISODE_ID: 32
MDC_IDC_EPISODE_ID: 33
MDC_IDC_EPISODE_ID: 34
MDC_IDC_EPISODE_ID: 35
MDC_IDC_EPISODE_ID: 36
MDC_IDC_EPISODE_ID: 37
MDC_IDC_EPISODE_ID: 38
MDC_IDC_EPISODE_ID: 39
MDC_IDC_EPISODE_ID: 40
MDC_IDC_EPISODE_ID: 41
MDC_IDC_EPISODE_ID: 42
MDC_IDC_EPISODE_ID: 43
MDC_IDC_EPISODE_ID: 44
MDC_IDC_EPISODE_ID: 45
MDC_IDC_EPISODE_ID: 46
MDC_IDC_EPISODE_ID: 47
MDC_IDC_EPISODE_ID: 48
MDC_IDC_EPISODE_ID: 49
MDC_IDC_EPISODE_ID: 50
MDC_IDC_EPISODE_ID: 51
MDC_IDC_EPISODE_ID: 52
MDC_IDC_EPISODE_ID: 53
MDC_IDC_EPISODE_ID: 54
MDC_IDC_EPISODE_ID: 55
MDC_IDC_EPISODE_TYPE: NORMAL
MDC_IDC_LEAD_CONNECTION_STATUS: NORMAL
MDC_IDC_LEAD_IMPLANT_DT: NORMAL
MDC_IDC_LEAD_LOCATION: NORMAL
MDC_IDC_LEAD_LOCATION_DETAIL_1: NORMAL
MDC_IDC_LEAD_MFG: NORMAL
MDC_IDC_LEAD_MODEL: NORMAL
MDC_IDC_LEAD_POLARITY_TYPE: NORMAL
MDC_IDC_LEAD_SERIAL: NORMAL
MDC_IDC_LEAD_SPECIAL_FUNCTION: NORMAL
MDC_IDC_MSMT_BATTERY_DTM: NORMAL
MDC_IDC_MSMT_BATTERY_DTM: NORMAL
MDC_IDC_MSMT_BATTERY_REMAINING_LONGEVITY: 101 MO
MDC_IDC_MSMT_BATTERY_REMAINING_LONGEVITY: 101 MO
MDC_IDC_MSMT_BATTERY_RRT_TRIGGER: 2.62
MDC_IDC_MSMT_BATTERY_RRT_TRIGGER: 2.62
MDC_IDC_MSMT_BATTERY_STATUS: NORMAL
MDC_IDC_MSMT_BATTERY_STATUS: NORMAL
MDC_IDC_MSMT_BATTERY_VOLTAGE: 2.99 V
MDC_IDC_MSMT_BATTERY_VOLTAGE: 2.99 V
MDC_IDC_MSMT_LEADCHNL_RA_IMPEDANCE_VALUE: 266 OHM
MDC_IDC_MSMT_LEADCHNL_RA_IMPEDANCE_VALUE: 266 OHM
MDC_IDC_MSMT_LEADCHNL_RA_IMPEDANCE_VALUE: 304 OHM
MDC_IDC_MSMT_LEADCHNL_RA_IMPEDANCE_VALUE: 323 OHM
MDC_IDC_MSMT_LEADCHNL_RA_PACING_THRESHOLD_AMPLITUDE: 0.5 V
MDC_IDC_MSMT_LEADCHNL_RA_PACING_THRESHOLD_AMPLITUDE: 0.5 V
MDC_IDC_MSMT_LEADCHNL_RA_PACING_THRESHOLD_PULSEWIDTH: 0.4 MS
MDC_IDC_MSMT_LEADCHNL_RA_PACING_THRESHOLD_PULSEWIDTH: 0.4 MS
MDC_IDC_MSMT_LEADCHNL_RA_SENSING_INTR_AMPL: 0.38 MV
MDC_IDC_MSMT_LEADCHNL_RA_SENSING_INTR_AMPL: 0.38 MV
MDC_IDC_MSMT_LEADCHNL_RA_SENSING_INTR_AMPL: 0.5 MV
MDC_IDC_MSMT_LEADCHNL_RV_IMPEDANCE_VALUE: 380 OHM
MDC_IDC_MSMT_LEADCHNL_RV_IMPEDANCE_VALUE: 399 OHM
MDC_IDC_MSMT_LEADCHNL_RV_IMPEDANCE_VALUE: 418 OHM
MDC_IDC_MSMT_LEADCHNL_RV_IMPEDANCE_VALUE: 437 OHM
MDC_IDC_MSMT_LEADCHNL_RV_PACING_THRESHOLD_AMPLITUDE: 0.88 V
MDC_IDC_MSMT_LEADCHNL_RV_PACING_THRESHOLD_AMPLITUDE: 0.88 V
MDC_IDC_MSMT_LEADCHNL_RV_PACING_THRESHOLD_PULSEWIDTH: 0.4 MS
MDC_IDC_MSMT_LEADCHNL_RV_PACING_THRESHOLD_PULSEWIDTH: 0.4 MS
MDC_IDC_MSMT_LEADCHNL_RV_SENSING_INTR_AMPL: 7.88 MV
MDC_IDC_MSMT_LEADCHNL_RV_SENSING_INTR_AMPL: 8 MV
MDC_IDC_MSMT_LEADCHNL_RV_SENSING_INTR_AMPL: 8 MV
MDC_IDC_PG_IMPLANT_DTM: NORMAL
MDC_IDC_PG_IMPLANT_DTM: NORMAL
MDC_IDC_PG_MFG: NORMAL
MDC_IDC_PG_MFG: NORMAL
MDC_IDC_PG_MODEL: NORMAL
MDC_IDC_PG_MODEL: NORMAL
MDC_IDC_PG_SERIAL: NORMAL
MDC_IDC_PG_SERIAL: NORMAL
MDC_IDC_PG_TYPE: NORMAL
MDC_IDC_PG_TYPE: NORMAL
MDC_IDC_SESS_CLINIC_NAME: NORMAL
MDC_IDC_SESS_CLINIC_NAME: NORMAL
MDC_IDC_SESS_DTM: NORMAL
MDC_IDC_SESS_DTM: NORMAL
MDC_IDC_SESS_TYPE: NORMAL
MDC_IDC_SESS_TYPE: NORMAL
MDC_IDC_SET_BRADY_AT_MODE_SWITCH_RATE: 171 {BEATS}/MIN
MDC_IDC_SET_BRADY_AT_MODE_SWITCH_RATE: 171 {BEATS}/MIN
MDC_IDC_SET_BRADY_HYSTRATE: NORMAL
MDC_IDC_SET_BRADY_HYSTRATE: NORMAL
MDC_IDC_SET_BRADY_LOWRATE: 60 {BEATS}/MIN
MDC_IDC_SET_BRADY_LOWRATE: 60 {BEATS}/MIN
MDC_IDC_SET_BRADY_MAX_SENSOR_RATE: 130 {BEATS}/MIN
MDC_IDC_SET_BRADY_MAX_SENSOR_RATE: 130 {BEATS}/MIN
MDC_IDC_SET_BRADY_MAX_TRACKING_RATE: 130 {BEATS}/MIN
MDC_IDC_SET_BRADY_MAX_TRACKING_RATE: 130 {BEATS}/MIN
MDC_IDC_SET_BRADY_MODE: NORMAL
MDC_IDC_SET_BRADY_MODE: NORMAL
MDC_IDC_SET_BRADY_PAV_DELAY_LOW: 180 MS
MDC_IDC_SET_BRADY_PAV_DELAY_LOW: 180 MS
MDC_IDC_SET_BRADY_SAV_DELAY_LOW: 150 MS
MDC_IDC_SET_BRADY_SAV_DELAY_LOW: 150 MS
MDC_IDC_SET_LEADCHNL_RA_PACING_AMPLITUDE: 1.5 V
MDC_IDC_SET_LEADCHNL_RA_PACING_AMPLITUDE: 1.5 V
MDC_IDC_SET_LEADCHNL_RA_PACING_ANODE_ELECTRODE_1: NORMAL
MDC_IDC_SET_LEADCHNL_RA_PACING_ANODE_ELECTRODE_1: NORMAL
MDC_IDC_SET_LEADCHNL_RA_PACING_ANODE_LOCATION_1: NORMAL
MDC_IDC_SET_LEADCHNL_RA_PACING_ANODE_LOCATION_1: NORMAL
MDC_IDC_SET_LEADCHNL_RA_PACING_CAPTURE_MODE: NORMAL
MDC_IDC_SET_LEADCHNL_RA_PACING_CAPTURE_MODE: NORMAL
MDC_IDC_SET_LEADCHNL_RA_PACING_CATHODE_ELECTRODE_1: NORMAL
MDC_IDC_SET_LEADCHNL_RA_PACING_CATHODE_ELECTRODE_1: NORMAL
MDC_IDC_SET_LEADCHNL_RA_PACING_CATHODE_LOCATION_1: NORMAL
MDC_IDC_SET_LEADCHNL_RA_PACING_CATHODE_LOCATION_1: NORMAL
MDC_IDC_SET_LEADCHNL_RA_PACING_POLARITY: NORMAL
MDC_IDC_SET_LEADCHNL_RA_PACING_POLARITY: NORMAL
MDC_IDC_SET_LEADCHNL_RA_PACING_PULSEWIDTH: 0.4 MS
MDC_IDC_SET_LEADCHNL_RA_PACING_PULSEWIDTH: 0.4 MS
MDC_IDC_SET_LEADCHNL_RA_SENSING_ANODE_ELECTRODE_1: NORMAL
MDC_IDC_SET_LEADCHNL_RA_SENSING_ANODE_ELECTRODE_1: NORMAL
MDC_IDC_SET_LEADCHNL_RA_SENSING_ANODE_LOCATION_1: NORMAL
MDC_IDC_SET_LEADCHNL_RA_SENSING_ANODE_LOCATION_1: NORMAL
MDC_IDC_SET_LEADCHNL_RA_SENSING_CATHODE_ELECTRODE_1: NORMAL
MDC_IDC_SET_LEADCHNL_RA_SENSING_CATHODE_ELECTRODE_1: NORMAL
MDC_IDC_SET_LEADCHNL_RA_SENSING_CATHODE_LOCATION_1: NORMAL
MDC_IDC_SET_LEADCHNL_RA_SENSING_CATHODE_LOCATION_1: NORMAL
MDC_IDC_SET_LEADCHNL_RA_SENSING_POLARITY: NORMAL
MDC_IDC_SET_LEADCHNL_RA_SENSING_POLARITY: NORMAL
MDC_IDC_SET_LEADCHNL_RA_SENSING_SENSITIVITY: 0.3 MV
MDC_IDC_SET_LEADCHNL_RA_SENSING_SENSITIVITY: 0.3 MV
MDC_IDC_SET_LEADCHNL_RV_PACING_AMPLITUDE: 2 V
MDC_IDC_SET_LEADCHNL_RV_PACING_AMPLITUDE: 2 V
MDC_IDC_SET_LEADCHNL_RV_PACING_ANODE_ELECTRODE_1: NORMAL
MDC_IDC_SET_LEADCHNL_RV_PACING_ANODE_ELECTRODE_1: NORMAL
MDC_IDC_SET_LEADCHNL_RV_PACING_ANODE_LOCATION_1: NORMAL
MDC_IDC_SET_LEADCHNL_RV_PACING_ANODE_LOCATION_1: NORMAL
MDC_IDC_SET_LEADCHNL_RV_PACING_CAPTURE_MODE: NORMAL
MDC_IDC_SET_LEADCHNL_RV_PACING_CAPTURE_MODE: NORMAL
MDC_IDC_SET_LEADCHNL_RV_PACING_CATHODE_ELECTRODE_1: NORMAL
MDC_IDC_SET_LEADCHNL_RV_PACING_CATHODE_ELECTRODE_1: NORMAL
MDC_IDC_SET_LEADCHNL_RV_PACING_CATHODE_LOCATION_1: NORMAL
MDC_IDC_SET_LEADCHNL_RV_PACING_CATHODE_LOCATION_1: NORMAL
MDC_IDC_SET_LEADCHNL_RV_PACING_POLARITY: NORMAL
MDC_IDC_SET_LEADCHNL_RV_PACING_POLARITY: NORMAL
MDC_IDC_SET_LEADCHNL_RV_PACING_PULSEWIDTH: 0.4 MS
MDC_IDC_SET_LEADCHNL_RV_PACING_PULSEWIDTH: 0.4 MS
MDC_IDC_SET_LEADCHNL_RV_SENSING_ANODE_ELECTRODE_1: NORMAL
MDC_IDC_SET_LEADCHNL_RV_SENSING_ANODE_ELECTRODE_1: NORMAL
MDC_IDC_SET_LEADCHNL_RV_SENSING_ANODE_LOCATION_1: NORMAL
MDC_IDC_SET_LEADCHNL_RV_SENSING_ANODE_LOCATION_1: NORMAL
MDC_IDC_SET_LEADCHNL_RV_SENSING_CATHODE_ELECTRODE_1: NORMAL
MDC_IDC_SET_LEADCHNL_RV_SENSING_CATHODE_ELECTRODE_1: NORMAL
MDC_IDC_SET_LEADCHNL_RV_SENSING_CATHODE_LOCATION_1: NORMAL
MDC_IDC_SET_LEADCHNL_RV_SENSING_CATHODE_LOCATION_1: NORMAL
MDC_IDC_SET_LEADCHNL_RV_SENSING_POLARITY: NORMAL
MDC_IDC_SET_LEADCHNL_RV_SENSING_POLARITY: NORMAL
MDC_IDC_SET_LEADCHNL_RV_SENSING_SENSITIVITY: 0.9 MV
MDC_IDC_SET_LEADCHNL_RV_SENSING_SENSITIVITY: 0.9 MV
MDC_IDC_SET_ZONE_DETECTION_INTERVAL: 350 MS
MDC_IDC_SET_ZONE_DETECTION_INTERVAL: 350 MS
MDC_IDC_SET_ZONE_DETECTION_INTERVAL: 400 MS
MDC_IDC_SET_ZONE_DETECTION_INTERVAL: 400 MS
MDC_IDC_SET_ZONE_STATUS: NORMAL
MDC_IDC_SET_ZONE_TYPE: NORMAL
MDC_IDC_SET_ZONE_VENDOR_TYPE: NORMAL
MDC_IDC_STAT_AT_BURDEN_PERCENT: 100 %
MDC_IDC_STAT_AT_BURDEN_PERCENT: 22.4 %
MDC_IDC_STAT_AT_DTM_END: NORMAL
MDC_IDC_STAT_AT_DTM_END: NORMAL
MDC_IDC_STAT_AT_DTM_START: NORMAL
MDC_IDC_STAT_AT_DTM_START: NORMAL
MDC_IDC_STAT_BRADY_AP_VP_PERCENT: 0.33 %
MDC_IDC_STAT_BRADY_AP_VS_PERCENT: 81.59 %
MDC_IDC_STAT_BRADY_AS_VP_PERCENT: 0.03 %
MDC_IDC_STAT_BRADY_AS_VS_PERCENT: 18.14 %
MDC_IDC_STAT_BRADY_DTM_END: NORMAL
MDC_IDC_STAT_BRADY_DTM_END: NORMAL
MDC_IDC_STAT_BRADY_DTM_START: NORMAL
MDC_IDC_STAT_BRADY_DTM_START: NORMAL
MDC_IDC_STAT_BRADY_RA_PERCENT_PACED: 1.53 %
MDC_IDC_STAT_BRADY_RA_PERCENT_PACED: 63.85 %
MDC_IDC_STAT_BRADY_RV_PERCENT_PACED: 0.92 %
MDC_IDC_STAT_BRADY_RV_PERCENT_PACED: 8.19 %
MDC_IDC_STAT_EPISODE_RECENT_COUNT: 0
MDC_IDC_STAT_EPISODE_RECENT_COUNT: 15
MDC_IDC_STAT_EPISODE_RECENT_COUNT: 20
MDC_IDC_STAT_EPISODE_RECENT_COUNT_DTM_END: NORMAL
MDC_IDC_STAT_EPISODE_RECENT_COUNT_DTM_START: NORMAL
MDC_IDC_STAT_EPISODE_TOTAL_COUNT: 0
MDC_IDC_STAT_EPISODE_TOTAL_COUNT: 1
MDC_IDC_STAT_EPISODE_TOTAL_COUNT: 1
MDC_IDC_STAT_EPISODE_TOTAL_COUNT: 2
MDC_IDC_STAT_EPISODE_TOTAL_COUNT: 2
MDC_IDC_STAT_EPISODE_TOTAL_COUNT: 36
MDC_IDC_STAT_EPISODE_TOTAL_COUNT: 51
MDC_IDC_STAT_EPISODE_TOTAL_COUNT_DTM_END: NORMAL
MDC_IDC_STAT_EPISODE_TOTAL_COUNT_DTM_START: NORMAL
MDC_IDC_STAT_EPISODE_TYPE: NORMAL
MDC_IDC_STAT_TACHYTHERAPY_RECENT_DTM_END: NORMAL
MDC_IDC_STAT_TACHYTHERAPY_RECENT_DTM_END: NORMAL
MDC_IDC_STAT_TACHYTHERAPY_RECENT_DTM_START: NORMAL
MDC_IDC_STAT_TACHYTHERAPY_RECENT_DTM_START: NORMAL
MDC_IDC_STAT_TACHYTHERAPY_TOTAL_DTM_END: NORMAL
MDC_IDC_STAT_TACHYTHERAPY_TOTAL_DTM_END: NORMAL
MDC_IDC_STAT_TACHYTHERAPY_TOTAL_DTM_START: NORMAL
MDC_IDC_STAT_TACHYTHERAPY_TOTAL_DTM_START: NORMAL

## 2025-04-23 RX ORDER — METOPROLOL TARTRATE 25 MG/1
125 TABLET, FILM COATED ORAL 2 TIMES DAILY
Status: SHIPPED
Start: 2025-04-23

## 2025-04-23 RX ORDER — POTASSIUM CHLORIDE 1500 MG/1
20 TABLET, EXTENDED RELEASE ORAL
OUTPATIENT
Start: 2025-04-23 | End: 2025-04-23

## 2025-04-23 RX ORDER — SODIUM CHLORIDE 9 MG/ML
INJECTION, SOLUTION INTRAVENOUS CONTINUOUS
OUTPATIENT
Start: 2025-04-23

## 2025-04-23 RX ORDER — LIDOCAINE 40 MG/G
CREAM TOPICAL
OUTPATIENT
Start: 2025-04-23

## 2025-04-23 RX ORDER — MAGNESIUM SULFATE HEPTAHYDRATE 40 MG/ML
2 INJECTION, SOLUTION INTRAVENOUS
OUTPATIENT
Start: 2025-04-23

## 2025-04-23 NOTE — TELEPHONE ENCOUNTER
30 days supply test claims requested for the drugs below:  Xarelto 20mg daily, Dabigatran etexilate 150mg BID      Xarelto 20mg daily  -$35.00 copay        Dabigatran etexilate 150mg BID  -$7.00 copay

## 2025-04-23 NOTE — PROGRESS NOTES
Type of Form Received: [employer] [id number]    Form Received (Date) 4/23/25   Form Filled out No   Placed in provider folder Yes

## 2025-04-23 NOTE — PROGRESS NOTES
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 Pt shares that she usually feels pretty good, but can tell if she over-exerts herself. She was supposed to have HC come today, but wasn't feeling well. They are coming on Friday. Reviewed upcoming appts, medications, pt declines further support and CC.  How are your symptoms? (Red Flag symptoms escalate to triage hotline per guidelines): Improved  Do you know how to contact your clinic care team if you have future questions or changes to your health status? : Yes  Does the patient have their discharge instructions? : Yes  Does the patient have questions regarding their discharge instructions? : No  Were you started on any new medications or were there changes to any of your previous medications? : Yes  Does the patient have all of their medications?: Yes  Do you have questions regarding any of your medications? : No  Do you have all of your needed medical supplies or equipment (DME)?  (i.e. oxygen tank, CPAP, cane, etc.): Yes              Follow up Plan     Discharge Follow-Up  Discharge follow up appointment scheduled in alignment with recommended follow up timeframe or Transitions of Risk Category? (Low = within 30 days; Moderate= within 14 days; High= within 7 days): Yes  Discharge Follow Up Appointment Date: 05/07/25  Discharge Follow Up  Appointment Scheduled with?: Primary Care Provider    Future Appointments   Date Time Provider Department Center   5/7/2025 12:30 PM Pedro Gomez MD [facility]   7/7/2025  1:30 PM Marzena Martin MD [facility]   7/28/2025  8:00 AM  LAB [facility]ABR [facility]   7/28/2025  8:30 AM  CV DEVICE 1 [facility]   7/28/2025  9:00 AM [id number] [facility]   7/28/2025 10:20 AM Randa Ann NP [facility]   10/29/2025 12:00 AM  ICD REMOTE [facility]SV [facility]       Outpatient Plan as outlined on AVS reviewed with patient.      For any urgent concerns, please contact our 24 hour nurse triage line: [phone]       LUPE CALLE RN

## 2025-04-23 NOTE — LETTER
4/23/2025      RE: Tessa Mansfield  [address]  [facility] [address]       Dear Tessa,    You are scheduled for a Cardioversion with Transesophageal Echocardiogram (MICHELL), at [facility]. The hospital is located at [address] [place] [address] [place].  If you need to cancel this procedure, please call [phone].       Visitor Policy: Two visitors.    Date:_April 30, 2025_____  Time: _7am_____To the Gold Waiting Room at the [facility]  Cardioversion    Please do not eat anything for 8 hours prior to your procedure. You may have sips of water up until 2 hours prior to your arrival.  2. Medications to continue:  - Anticoagulant (Example: Eliquis, Xarelto, Pradaxa, Warfarin)  - Take all meds as prescribed, except for those noted below.  3. Medications to hold:    - Morning of: Lasix  4. You will discharge same day. You will need a .    If you have further questions, please utilize [employer] to contact us.   If your question concerns the above instructions, contact:  Orion Kumari RN   Cardiology Nurse Coordinator    [phone]    If your question concerns the schedule/appointment times, contact:  JEROMY Rodriguez Procedure   [phone]

## 2025-04-23 NOTE — TELEPHONE ENCOUNTER
Spoke with Sparkle MEDINA with [employer] and gave the following verbal order(s): Home Care Orders: delay start of care until 04/25/2025 per patient -PT, OT and home health aid.   Jordyn DUQUE LPN  [facility]     warm

## 2025-04-23 NOTE — TELEPHONE ENCOUNTER
The following information was reviewed with pt over the phone. She is in agreement with increasing Metoprolol dose, proceeding with DCCV and resuming eliquis for 4 weeks. Pt reports understanding and denies further questions at this time.     Orion Kumari, RN   Cardiology Nurse Coordinator

## 2025-04-23 NOTE — TELEPHONE ENCOUNTER
----- Message from Lisa Mendoza sent at 4/23/2025  1:23 PM CDT -----  Regarding: FW: AFib with RVR  Hi Orion,    I got this alert from the device nurses that this patient has been in Afib with RVR with rates >100 bpm. Could you please reach out to her and 1. Have her increase her metoprolol tartrate to 125 mg BID and 2. Help her get set up for a MICHELL/DCCV? She will need to take a month of eliquis despite being s/p watchman, so she will need to take the first dose on the morning of the procedure and then for the 4 weeks following DCCV.     Thank you so much!!  Lisa  ----- Message -----  From: Hilary Stahl RN  Sent: 4/23/2025  11:14 AM CDT  To: Lsia Mendoza PA-C  Subject: AFib with RVR                                    Hey there Lisa!  I just want to send the results of this darlin ladwindy pacemaker check.  She is feeling pretty short of breath and is having trouble with ADL's .  She has a watchman. Looks like her AF started about 3 weeks ago, and her heart rates are too fast.  Would you mind taking a peek her and see if maybe we should try to cardiovert her?  Thank You!    Device: Medtronic [id number] Beatrice XT DR DIA  Normal Device Function  Mode: AAIR<>DDDR  bpm  AP: 1.5%  : 8.2%  Presenting EGM: Afib w/ VS  bpm  Short V-V intervals: 5  Lead Trends Appear Stable.  Estimated battery longevity to RRT = 8.5 years. Battery voltage = 2.99 V.   Atrial Arrhythmia: Afib continues for the past 3 weeks, V rates are still averaging >100 bpm   AF [place]: 100% since last remote.  Anticoagulant: Watchman  Ventricular Arrhythmia: None  Pt Notified of Transmission Results: Patient was called and she is reporting activity intolerence and increased SOB with activities    Plan: Results will sent Lisa DEAN for review and possible DCCV.  Device follow-up every 3 months.  Hilary Stahl RN

## 2025-04-23 NOTE — TELEPHONE ENCOUNTER
[facility] Call Center    Phone Message    May a detailed message be left on voicemail: yes     Reason for Call: Order(s): Home Care Orders: Other: delay start of care until 04/25/2025 per patient. It's for PT, OT and home health aid.

## 2025-04-24 ENCOUNTER — MEDICAL CORRESPONDENCE (OUTPATIENT)
Dept: HEALTH INFORMATION MANAGEMENT | Facility: CLINIC | Age: 89
End: 2025-04-24
Payer: COMMERCIAL

## 2025-04-24 RX ORDER — DABIGATRAN ETEXILATE 75 MG/1
75 CAPSULE ORAL 2 TIMES DAILY
Qty: 60 CAPSULE | Refills: 0 | Status: SHIPPED | OUTPATIENT
Start: 2025-04-24

## 2025-04-24 NOTE — TELEPHONE ENCOUNTER
Spoke to the patient on the phone regarding anticoagulation.  She reports that she prefers to take the Dabigatran medication.  Per Lisa's message will send 75 mg BID for a 1 month supply.  We also briefly went over pre-procedure instructions again.  I also clarified that her metoprolol is to be increased to 125mg BID until the procedure per Lisa.  She will call if there are other questions or concerns.    Clark Valdez RN BSN  Cardiology Nurse Coordinator

## 2025-04-25 ENCOUNTER — MEDICAL CORRESPONDENCE (OUTPATIENT)
Dept: HEALTH INFORMATION MANAGEMENT | Facility: CLINIC | Age: 89
End: 2025-04-25

## 2025-04-28 LAB
MDC_IDC_EPISODE_DTM: NORMAL
MDC_IDC_EPISODE_DURATION: 1406 S
MDC_IDC_EPISODE_DURATION: 2528 S
MDC_IDC_EPISODE_DURATION: 4992 S
MDC_IDC_EPISODE_DURATION: 5649 S
MDC_IDC_EPISODE_DURATION: 8340 S
MDC_IDC_EPISODE_DURATION: 8850 S
MDC_IDC_EPISODE_DURATION: NORMAL S
MDC_IDC_EPISODE_ID: 21
MDC_IDC_EPISODE_ID: 22
MDC_IDC_EPISODE_ID: 23
MDC_IDC_EPISODE_ID: 24
MDC_IDC_EPISODE_ID: 25
MDC_IDC_EPISODE_ID: 26
MDC_IDC_EPISODE_ID: 27
MDC_IDC_EPISODE_ID: 28
MDC_IDC_EPISODE_ID: 29
MDC_IDC_EPISODE_ID: 30
MDC_IDC_EPISODE_ID: 31
MDC_IDC_EPISODE_ID: 32
MDC_IDC_EPISODE_ID: 33
MDC_IDC_EPISODE_ID: 34
MDC_IDC_EPISODE_ID: 35
MDC_IDC_EPISODE_ID: 36
MDC_IDC_EPISODE_ID: 37
MDC_IDC_EPISODE_ID: 38
MDC_IDC_EPISODE_ID: 39
MDC_IDC_EPISODE_ID: 40
MDC_IDC_EPISODE_TYPE: NORMAL
MDC_IDC_LEAD_CONNECTION_STATUS: NORMAL
MDC_IDC_LEAD_CONNECTION_STATUS: NORMAL
MDC_IDC_LEAD_IMPLANT_DT: NORMAL
MDC_IDC_LEAD_IMPLANT_DT: NORMAL
MDC_IDC_LEAD_LOCATION: NORMAL
MDC_IDC_LEAD_LOCATION: NORMAL
MDC_IDC_LEAD_LOCATION_DETAIL_1: NORMAL
MDC_IDC_LEAD_LOCATION_DETAIL_1: NORMAL
MDC_IDC_LEAD_MFG: NORMAL
MDC_IDC_LEAD_MFG: NORMAL
MDC_IDC_LEAD_MODEL: NORMAL
MDC_IDC_LEAD_MODEL: NORMAL
MDC_IDC_LEAD_POLARITY_TYPE: NORMAL
MDC_IDC_LEAD_POLARITY_TYPE: NORMAL
MDC_IDC_LEAD_SERIAL: NORMAL
MDC_IDC_LEAD_SERIAL: NORMAL
MDC_IDC_LEAD_SPECIAL_FUNCTION: NORMAL
MDC_IDC_LEAD_SPECIAL_FUNCTION: NORMAL
MDC_IDC_MSMT_BATTERY_DTM: NORMAL
MDC_IDC_MSMT_BATTERY_REMAINING_LONGEVITY: 101 MO
MDC_IDC_MSMT_BATTERY_RRT_TRIGGER: 2.62
MDC_IDC_MSMT_BATTERY_STATUS: NORMAL
MDC_IDC_MSMT_BATTERY_VOLTAGE: 2.99 V
MDC_IDC_MSMT_LEADCHNL_RA_IMPEDANCE_VALUE: 266 OHM
MDC_IDC_MSMT_LEADCHNL_RA_IMPEDANCE_VALUE: 304 OHM
MDC_IDC_MSMT_LEADCHNL_RA_PACING_THRESHOLD_AMPLITUDE: 0.5 V
MDC_IDC_MSMT_LEADCHNL_RA_PACING_THRESHOLD_PULSEWIDTH: 0.4 MS
MDC_IDC_MSMT_LEADCHNL_RA_SENSING_INTR_AMPL: 0.38 MV
MDC_IDC_MSMT_LEADCHNL_RA_SENSING_INTR_AMPL: 0.38 MV
MDC_IDC_MSMT_LEADCHNL_RV_IMPEDANCE_VALUE: 399 OHM
MDC_IDC_MSMT_LEADCHNL_RV_IMPEDANCE_VALUE: 437 OHM
MDC_IDC_MSMT_LEADCHNL_RV_PACING_THRESHOLD_AMPLITUDE: 0.88 V
MDC_IDC_MSMT_LEADCHNL_RV_PACING_THRESHOLD_PULSEWIDTH: 0.4 MS
MDC_IDC_MSMT_LEADCHNL_RV_SENSING_INTR_AMPL: 8 MV
MDC_IDC_MSMT_LEADCHNL_RV_SENSING_INTR_AMPL: 8 MV
MDC_IDC_PG_IMPLANT_DTM: NORMAL
MDC_IDC_PG_MFG: NORMAL
MDC_IDC_PG_MODEL: NORMAL
MDC_IDC_PG_SERIAL: NORMAL
MDC_IDC_PG_TYPE: NORMAL
MDC_IDC_SESS_CLINIC_NAME: NORMAL
MDC_IDC_SESS_DTM: NORMAL
MDC_IDC_SESS_TYPE: NORMAL
MDC_IDC_SET_BRADY_AT_MODE_SWITCH_RATE: 171 {BEATS}/MIN
MDC_IDC_SET_BRADY_HYSTRATE: NORMAL
MDC_IDC_SET_BRADY_LOWRATE: 60 {BEATS}/MIN
MDC_IDC_SET_BRADY_MAX_SENSOR_RATE: 130 {BEATS}/MIN
MDC_IDC_SET_BRADY_MAX_TRACKING_RATE: 130 {BEATS}/MIN
MDC_IDC_SET_BRADY_MODE: NORMAL
MDC_IDC_SET_BRADY_PAV_DELAY_LOW: 180 MS
MDC_IDC_SET_BRADY_SAV_DELAY_LOW: 150 MS
MDC_IDC_SET_LEADCHNL_RA_PACING_AMPLITUDE: 1.5 V
MDC_IDC_SET_LEADCHNL_RA_PACING_ANODE_ELECTRODE_1: NORMAL
MDC_IDC_SET_LEADCHNL_RA_PACING_ANODE_LOCATION_1: NORMAL
MDC_IDC_SET_LEADCHNL_RA_PACING_CAPTURE_MODE: NORMAL
MDC_IDC_SET_LEADCHNL_RA_PACING_CATHODE_ELECTRODE_1: NORMAL
MDC_IDC_SET_LEADCHNL_RA_PACING_CATHODE_LOCATION_1: NORMAL
MDC_IDC_SET_LEADCHNL_RA_PACING_POLARITY: NORMAL
MDC_IDC_SET_LEADCHNL_RA_PACING_PULSEWIDTH: 0.4 MS
MDC_IDC_SET_LEADCHNL_RA_SENSING_ANODE_ELECTRODE_1: NORMAL
MDC_IDC_SET_LEADCHNL_RA_SENSING_ANODE_LOCATION_1: NORMAL
MDC_IDC_SET_LEADCHNL_RA_SENSING_CATHODE_ELECTRODE_1: NORMAL
MDC_IDC_SET_LEADCHNL_RA_SENSING_CATHODE_LOCATION_1: NORMAL
MDC_IDC_SET_LEADCHNL_RA_SENSING_POLARITY: NORMAL
MDC_IDC_SET_LEADCHNL_RA_SENSING_SENSITIVITY: 0.3 MV
MDC_IDC_SET_LEADCHNL_RV_PACING_AMPLITUDE: 2 V
MDC_IDC_SET_LEADCHNL_RV_PACING_ANODE_ELECTRODE_1: NORMAL
MDC_IDC_SET_LEADCHNL_RV_PACING_ANODE_LOCATION_1: NORMAL
MDC_IDC_SET_LEADCHNL_RV_PACING_CAPTURE_MODE: NORMAL
MDC_IDC_SET_LEADCHNL_RV_PACING_CATHODE_ELECTRODE_1: NORMAL
MDC_IDC_SET_LEADCHNL_RV_PACING_CATHODE_LOCATION_1: NORMAL
MDC_IDC_SET_LEADCHNL_RV_PACING_POLARITY: NORMAL
MDC_IDC_SET_LEADCHNL_RV_PACING_PULSEWIDTH: 0.4 MS
MDC_IDC_SET_LEADCHNL_RV_SENSING_ANODE_ELECTRODE_1: NORMAL
MDC_IDC_SET_LEADCHNL_RV_SENSING_ANODE_LOCATION_1: NORMAL
MDC_IDC_SET_LEADCHNL_RV_SENSING_CATHODE_ELECTRODE_1: NORMAL
MDC_IDC_SET_LEADCHNL_RV_SENSING_CATHODE_LOCATION_1: NORMAL
MDC_IDC_SET_LEADCHNL_RV_SENSING_POLARITY: NORMAL
MDC_IDC_SET_LEADCHNL_RV_SENSING_SENSITIVITY: 0.9 MV
MDC_IDC_SET_ZONE_DETECTION_INTERVAL: 350 MS
MDC_IDC_SET_ZONE_DETECTION_INTERVAL: 400 MS
MDC_IDC_SET_ZONE_STATUS: NORMAL
MDC_IDC_SET_ZONE_STATUS: NORMAL
MDC_IDC_SET_ZONE_TYPE: NORMAL
MDC_IDC_SET_ZONE_VENDOR_TYPE: NORMAL
MDC_IDC_STAT_AT_BURDEN_PERCENT: 22.4 %
MDC_IDC_STAT_AT_DTM_END: NORMAL
MDC_IDC_STAT_AT_DTM_START: NORMAL
MDC_IDC_STAT_BRADY_AP_VP_PERCENT: 0.33 %
MDC_IDC_STAT_BRADY_AP_VS_PERCENT: 81.59 %
MDC_IDC_STAT_BRADY_AS_VP_PERCENT: 0.03 %
MDC_IDC_STAT_BRADY_AS_VS_PERCENT: 18.14 %
MDC_IDC_STAT_BRADY_DTM_END: NORMAL
MDC_IDC_STAT_BRADY_DTM_START: NORMAL
MDC_IDC_STAT_BRADY_RA_PERCENT_PACED: 63.85 %
MDC_IDC_STAT_BRADY_RV_PERCENT_PACED: 0.92 %
MDC_IDC_STAT_EPISODE_RECENT_COUNT: 0
MDC_IDC_STAT_EPISODE_RECENT_COUNT: 20
MDC_IDC_STAT_EPISODE_RECENT_COUNT_DTM_END: NORMAL
MDC_IDC_STAT_EPISODE_RECENT_COUNT_DTM_START: NORMAL
MDC_IDC_STAT_EPISODE_TOTAL_COUNT: 0
MDC_IDC_STAT_EPISODE_TOTAL_COUNT: 0
MDC_IDC_STAT_EPISODE_TOTAL_COUNT: 1
MDC_IDC_STAT_EPISODE_TOTAL_COUNT: 2
MDC_IDC_STAT_EPISODE_TOTAL_COUNT: 36
MDC_IDC_STAT_EPISODE_TOTAL_COUNT_DTM_END: NORMAL
MDC_IDC_STAT_EPISODE_TOTAL_COUNT_DTM_START: NORMAL
MDC_IDC_STAT_EPISODE_TYPE: NORMAL
MDC_IDC_STAT_TACHYTHERAPY_RECENT_DTM_END: NORMAL
MDC_IDC_STAT_TACHYTHERAPY_RECENT_DTM_START: NORMAL
MDC_IDC_STAT_TACHYTHERAPY_TOTAL_DTM_END: NORMAL
MDC_IDC_STAT_TACHYTHERAPY_TOTAL_DTM_START: NORMAL

## 2025-04-29 NOTE — H&P
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 She was recently admitted to the hospital from 4/1-4/9 for severe worsening pain, particularly in the right knee, back, hip, and foot c/f trauma vs infectious/septic vs inflammatory cause of pain. She was found to be in Afib with RVR at this time, so her lopressor was increased from 100 mg BID to 125 mg BID. She continued to be in Afib with RVR on device interrogation on 4/23/25, so MICHELL/DCCV was discussed and the patient was agreeable to proceeding. She presents today for MICHELL/DCCV.     Upon arrival, patient complained of SOB and 10/10 back pain similar to how she felt prior to her previous MI. For this reason, she was sent to the ER for further evaluation before returning for MICHELL/DCCV later that afternoon.          Active problem list:     Patient Active Problem List    Diagnosis Date Noted    Unsteady gait 04/02/2025     Priority: Medium    Effusion of right knee 04/02/2025     Priority: Medium    History of myocardial infarction 10/28/2024     Priority: Medium    Restless legs 09/04/2024     Priority: Medium    Atrial fibrillation (H) 08/08/2024     Priority: Medium    Symptomatic anemia 07/03/2024     Priority: Medium    Gastrointestinal hemorrhage, unspecified gastrointestinal hemorrhage type 07/03/2024     Priority: Medium    Paroxysmal atrial fibrillation (H) 06/19/2024     Priority: Medium    Stage 3a chronic kidney disease (H) 06/19/2024     Priority: Medium     Hypertensive heart and kidney disease 05/09/2024     Priority: Medium    Chronic cough 05/09/2024     Priority: Medium    UGIB (upper gastrointestinal bleed) 05/01/2024     Priority: Medium    Small bowel obstruction (H) 04/13/2024     Priority: Medium    Weakness 03/17/2024     Priority: Medium    Acute cystitis with hematuria 03/17/2024     Priority: Medium    Leukocytosis, unspecified type 03/17/2024     Priority: Medium    Long term (current) use of anticoagulants 07/19/2023     Priority: Medium    Melena 07/19/2023     Priority: Medium    General weakness 07/19/2023     Priority: Medium    S/P coronary artery stent placement 07/19/2023     Priority: Medium    Antiplatelet or antithrombotic long-term use 07/19/2023     Priority: Medium    Anemia, unspecified type 07/19/2023     Priority: Medium    Renal hypertension 07/17/2023     Priority: Medium    Acute on chronic diastolic (congestive) heart failure (H) 07/17/2023     Priority: Medium    Chest pain, unspecified type 07/17/2023     Priority: Medium    Secondary renal hyperparathyroidism 08/08/2022     Priority: Medium    Thrombocytopenia 08/08/2022     Priority: Medium    Status post coronary angiogram 06/17/2022     Priority: Medium    Anemia of chronic renal failure, stage 4 (severe) (H) 12/11/2020     Priority: Medium    Non-rheumatic mitral regurgitation 12/10/2019     Priority: Medium    Non-rheumatic tricuspid valve insufficiency 12/10/2019     Priority: Medium    Sick sinus syndrome (H) 10/14/2019     Priority: Medium     Added automatically from request for surgery [id number]      Osteoarthritis of right patellofemoral joint 06/25/2018     Priority: Medium    Aftercare following surgery of the musculoskeletal system 04/05/2018     Priority: Medium    Postoperative anemia due to acute blood loss 03/22/2018     Priority: Medium    Orthostatic hypotension 03/21/2018     Priority: Medium    ACP (advance care planning) 03/20/2018     Priority: Medium      Formatting of this note is different from the original. Patient has identified Health Care Agent(s): Yes Add Health Care Agents: Yes   Health Care Agent(s): Primary Health Care Agent: Ludwin Mansfield  Relationship:Spouse Phone: [phone] Secondary Health Care Agent: Max Mansfield Relationship: son Phone: [phone] Conservator:  Relationship:  Phone:   Guardian: Relationship:  Phone:  Patient has Advance Care Plan Documents (Health Care Directive, POLST): No, Pt stated that her HCD in process of being completed.      Hyperlipidemia 03/19/2018     Priority: Medium    Hypokalemia 03/19/2018     Priority: Medium    Chronic anemia 03/19/2018     Priority: Medium    Chronic kidney disease, stage 3 (H) 03/19/2018     Priority: Medium    GERD (gastroesophageal reflux disease) 03/19/2018     Priority: Medium    Closed right femoral fracture (H) 03/18/2018     Priority: Medium    Arrhythmia 09/15/2013     Priority: Medium    NSTEMI (non-ST elevated myocardial infarction) (H) 07/23/2013     Priority: Medium    Sinus node dysfunction (H) 12/19/2012     Priority: Medium    Dizziness and giddiness 02/21/2012     Priority: Medium    Edema 02/21/2012     Priority: Medium    Esophageal reflux 02/21/2012     Priority: Medium    Gout 02/21/2012     Priority: Medium     Problem list name updated by automated process. Provider to review  Formatting of this note might be different from the original. Overview: Problem list name updated by automated process. Provider to review      Essential hypertension 02/21/2012     Priority: Medium     Problem list name updated by automated process. Provider to review  Formatting of this note might be different from the original. Overview: Problem list name updated by automated process. Provider to review      Obstructive sleep apnea 02/21/2012     Priority: Medium    Osteomalacia 02/21/2012     Priority: Medium    Post-menopausal osteoporosis 02/21/2012     Priority: Medium     (Problem list name  updated by automated process. Provider to review and confirm.)  Formatting of this note might be different from the original. Overview: (Problem list name updated by automated process. Provider to review and confirm.)      Coronary artery disease of native artery of native heart with stable angina pectoris      Priority: Medium     10/27/2002: AMI s/p PCI+BMS (3.5x18mm Bx velocity) to mRCA  2/5/2003: Back pain; PCI+stent to mLCX c/b distal embolization/slow flow  4/11/2003: Unstable angina; PCI+stent (Hepacoat velocity) to mLAD  11/27/2007: PCI+DESx2 to pLAD (IVUS w/ calcification of LMCA and ulcerated plaque pLAD, 80% dRCA; also had 80% dLCX, too small to stent)  12/11/2007: Staged PCI. PCI+DESx1 (3.5x13mm Cypher) to dRCA (indefinite DAPT recommended at this time)  6/27/2008: Angina. mLCX stent 70% ISR. LAD and RCA stents patent. Myocardium at risk from LCX felt to be small, medical management preferred to PCI.  11/10/2009: Angina. Findings unchanged from 6/27/2008, FFR LAD 0.90. Medical management recommended.  7/23/2013: Unstable angina; PCI+ALVAREZ to mPDA. Diagnostic findings: LMCA 40% distal. LAD: pLAD stents patent mLAD 30% ISR dLAD 50% diffuse, D2 diffuse disease. LCX 80% mid ISR. RCA diffuse <30% mid, RPLAs diffuse disease, RPDA 100% occlusion.         Disturbance of skin sensation 06/19/2009     Priority: Medium     Class: Chronic     Pins, needles, burning hot pain in feet for four years.  Formatting of this note might be different from the original. Overview: Pins, needles, burning hot pain in feet for four years.      Degeneration of cervical intervertebral disc 02/08/2008     Priority: Medium    Nonallopathic lesion of cervical region 02/08/2008     Priority: Medium     Problem list name updated by automated process. Provider to review      Nonallopathic lesion of thoracic region 02/08/2008     Priority: Medium     Problem list name updated by automated process. Provider to review      Biomechanical  lesion, unspecified 02/08/2008     Priority: Medium     Formatting of this note might be different from the original. Overview: Problem list name updated by automated process. Provider to review Formatting of this note might be different from the original. Overview: Problem list name updated by automated process. Provider to review      Cardiac Pacemaker- [employer], dual chamber- NOT dependant 11/28/2007     Priority: Medium    Transient complete heart block (H) 11/28/2007     Priority: Medium            Medications (include herbals and vitamins):      Current Outpatient Medications   Medication Sig Dispense Refill    acetaminophen (TYLENOL) 325 MG tablet Take 975 mg by mouth nightly as needed for pain      allopurinol (ZYLOPRIM) 100 MG tablet TAKE 1 TABLET BY MOUTH ONCE DAILY *NEEDS TO HAVE LABS FOR FURTHER REFILLS* 90 tablet 0    amLODIPine (NORVASC) 5 MG tablet Take 1 tablet (5 mg) by mouth daily. 90 tablet 3    clopidogrel (PLAVIX) 75 MG tablet Take 1 tablet (75 mg) by mouth daily. 90 tablet 3    dabigatran ANTICOAGULANT (PRADAXA) 75 MG capsule Take 1 capsule (75 mg) by mouth 2 times daily. Store in original 's bottle or blister pack; use within 120 days of opening. 60 capsule 0    furosemide (LASIX) 40 MG tablet Take 1 tablet (40 mg) by mouth daily May take an additional 20 mg at noon as needed for swelling. 90 tablet 2    isosorbide mononitrate (IMDUR) 30 MG 24 hr tablet Take 4 tablets (120 mg) by mouth daily.      lidocaine (LIDODERM) 5 % patch Place 1 patch over 12 hours onto the skin every 24 hours. To prevent lidocaine toxicity, patient should be patch free for 12 hrs daily. 10 patch 0    losartan (COZAAR) 25 MG tablet Take 1 tablet (25 mg) by mouth 2 times daily. 180 tablet 3    methocarbamol (ROBAXIN) 500 MG tablet Take 1 tablet (500 mg) by mouth every 6 hours as needed for muscle spasms. 30 tablet 5    metoprolol tartrate (LOPRESSOR) 25 MG tablet Take 5 tablets (125 mg) by mouth 2 times  "daily.      Multiple Vitamins-Minerals (PRESERVISION AREDS 2+MULTI VIT PO) Take 1 capsule by mouth 2 times daily      omeprazole (PRILOSEC) 40 MG DR capsule Take 1 capsule (40 mg) by mouth daily. 90 capsule 3    potassium chloride ER (K-TAB) 20 MEQ CR tablet Take 1 tablet (20 mEq) by mouth daily 90 tablet 3    psyllium (METAMUCIL/KONSYL) Packet Take 1 packet by mouth daily as needed for constipation.      rosuvastatin (CRESTOR) 20 MG tablet Take 1 tablet by mouth once daily 90 tablet 0    sodium bicarbonate 650 MG tablet Take 1 tablet (650 mg) by mouth 2 times daily 60 tablet 11    timolol maleate (TIMOPTIC) 0.5 % ophthalmic solution INSTILL 1 DROP INTO EACH EYE ONCE DAILY IN THE MORNING      vitamin B-12 (CYANOCOBALAMIN) 500 MCG tablet Take 1 tablet by mouth daily      vitamin D3 25 mcg (1000 units) tablet Take 1 tablet (25 mcg) by mouth every other day 90 tablet 3     No current facility-administered medications for this encounter.           Medication List         Notice    Cannot display patient medications because the patient has not yet been checked in.              Allergies:      Allergies   Allergen Reactions    Shrimp Swelling    Codeine Sulfate Itching    Indomethacin      Other Reaction(s): Not available    Levofloxacin Hemihydrate      Other Reaction(s): Not available    Morphine Sulfate      Other Reaction(s): Not available    Oxycodone Other (See Comments)    Shrimp Extract Swelling    Sulfamethoxazole W/Trimethoprim      Other Reaction(s): Not available    Chlorthalidone Nausea and Vomiting     Other Reaction(s): Not available    Clarithromycin      Other Reaction(s): Not available    Clonidine      Other Reaction(s): Not available    Darvon [Propoxyphene Hcl]     Gabapentin Confusion and Fatigue     \"felt drunk\"    Other Reaction(s): Not available    Hydromorphone Hallucination and Visual Disturbance     Tolerated in April 2024 hospital admissions    Other Reaction(s): Not available    Indomethacin  "    Percocet [Oxycodone-Acetaminophen] Hallucination    Pregabalin Fatigue and Itching    Simvastatin Palpitations and Cramps     Muscle weakness, leg cramping    Spironolactone      Dehydrated    Other Reaction(s): Not available    Terazosin      Other Reaction(s): Not available             Social History:     Social History     Tobacco Use    Smoking status: Former     Current packs/day: 0.30     Average packs/day: 0.3 packs/day for 15.0 years (4.5 ttl pk-yrs)     Types: Cigarettes    Smokeless tobacco: Never    Tobacco comments:     Quit 35+ years ago   Substance Use Topics    Alcohol use: Not on file            Physical Exam:   All vitals have been reviewed  No data found.  No intake/output data recorded.  Airway assessment:   Patient is able to open mouth wide  Patient is able to stick out tongue}      ENT:   normocephalic, without obvious abnormality     Neck:   supple, symmetrical, trachea midline     Lungs:   No increased work of breathing, good air exchange     Cardiovascular:   normal S1 and S2 and no edema             Lab / Radiology Results:     Lab Results   Component Value Date    WBC 8.3 04/08/2025    WBC 5.4 08/26/2020    RBC 3.27 04/08/2025    RBC 3.58 08/26/2020    HGB 10.1 04/08/2025    HGB 11.2 08/26/2020    HCT 31.1 04/08/2025    HCT 36.0 08/26/2020    MCV 95 04/08/2025     08/26/2020    RDW 14.5 04/08/2025    RDW 14.9 08/26/2020     04/08/2025     08/26/2020      Lab Results   Component Value Date    WBC 8.3 04/08/2025    WBC 5.4 08/26/2020     Lab Results   Component Value Date     04/08/2025     08/26/2020     Lab Results   Component Value Date    HGB 10.1 04/08/2025    HGB 11.2 08/26/2020    HCT 31.1 04/08/2025    HCT 36.0 08/26/2020     Lab Results   Component Value Date     04/08/2025     08/26/2020    CO2 19 04/08/2025    CO2 24 11/21/2022    CO2 24 08/26/2020    BUN 40.0 04/08/2025    BUN 35 11/21/2022    BUN 41 08/26/2020     Lab Results    Component Value Date     04/08/2025     08/26/2020    CO2 19 04/08/2025    CO2 24 11/21/2022    CO2 24 08/26/2020    BUN 40.0 04/08/2025    BUN 35 11/21/2022    BUN 41 08/26/2020       Lab Results   Component Value Date    TSH 3.28 09/29/2021    TSH 1.87 03/24/2016             Plan:   Patient's active problems diagnostically and therapeutically optimized for the planned procedure. Patient here for MICHELL/DCCV. Procedure explained in detail to patient including indications, risks, and benefits. Patient states understanding and wishes to procced.     Lisa Mendoza PA-C  [facility]  Electrophysiology Consult Service  Pager #[phone]

## 2025-04-30 ENCOUNTER — APPOINTMENT (OUTPATIENT)
Dept: CT IMAGING | Facility: CLINIC | Age: 89
End: 2025-04-30
Attending: INTERNAL MEDICINE
Payer: COMMERCIAL

## 2025-04-30 ENCOUNTER — APPOINTMENT (OUTPATIENT)
Dept: MEDSURG UNIT | Facility: CLINIC | Age: 89
End: 2025-04-30
Payer: COMMERCIAL

## 2025-04-30 ENCOUNTER — HOSPITAL ENCOUNTER (OUTPATIENT)
Dept: CARDIOLOGY | Facility: CLINIC | Age: 89
Discharge: HOME | End: 2025-04-30
Payer: COMMERCIAL

## 2025-04-30 ENCOUNTER — APPOINTMENT (OUTPATIENT)
Dept: NUCLEAR MEDICINE | Facility: CLINIC | Age: 89
End: 2025-04-30
Attending: INTERNAL MEDICINE
Payer: COMMERCIAL

## 2025-04-30 ENCOUNTER — ANESTHESIA EVENT (OUTPATIENT)
Dept: SURGERY | Facility: CLINIC | Age: 89
End: 2025-04-30
Payer: COMMERCIAL

## 2025-04-30 ENCOUNTER — ANESTHESIA (OUTPATIENT)
Dept: SURGERY | Facility: CLINIC | Age: 89
End: 2025-04-30
Payer: COMMERCIAL

## 2025-04-30 ENCOUNTER — APPOINTMENT (OUTPATIENT)
Dept: CARDIOLOGY | Facility: CLINIC | Age: 89
End: 2025-04-30
Attending: INTERNAL MEDICINE
Payer: COMMERCIAL

## 2025-04-30 ENCOUNTER — HOSPITAL ENCOUNTER (EMERGENCY)
Facility: CLINIC | Age: 89
Discharge: HOME | End: 2025-04-30
Attending: INTERNAL MEDICINE
Payer: COMMERCIAL

## 2025-04-30 VITALS
RESPIRATION RATE: 15 BRPM | HEART RATE: 64 BPM | SYSTOLIC BLOOD PRESSURE: 151 MMHG | OXYGEN SATURATION: 96 % | DIASTOLIC BLOOD PRESSURE: 80 MMHG

## 2025-04-30 VITALS
HEART RATE: 63 BPM | RESPIRATION RATE: 16 BRPM | TEMPERATURE: 97.5 F | DIASTOLIC BLOOD PRESSURE: 74 MMHG | SYSTOLIC BLOOD PRESSURE: 151 MMHG | OXYGEN SATURATION: 98 %

## 2025-04-30 DIAGNOSIS — M54.9 UPPER BACK PAIN ON LEFT SIDE: ICD-10-CM

## 2025-04-30 DIAGNOSIS — I48.91 ATRIAL FIBRILLATION (H): ICD-10-CM

## 2025-04-30 DIAGNOSIS — D63.1 ANEMIA OF CHRONIC RENAL FAILURE, STAGE 4 (SEVERE) (H): ICD-10-CM

## 2025-04-30 DIAGNOSIS — N18.4 ANEMIA OF CHRONIC RENAL FAILURE, STAGE 4 (SEVERE) (H): ICD-10-CM

## 2025-04-30 DIAGNOSIS — I48.0 PAROXYSMAL ATRIAL FIBRILLATION (H): ICD-10-CM

## 2025-04-30 LAB
ALBUMIN SERPL BCG-MCNC: 3.7 G/DL (ref 3.5–5.2)
ALP SERPL-CCNC: 145 U/L (ref 40–150)
ALT SERPL W P-5'-P-CCNC: 28 U/L (ref 0–50)
ANION GAP SERPL CALCULATED.3IONS-SCNC: 15 MMOL/L (ref 7–15)
AST SERPL W P-5'-P-CCNC: 36 U/L (ref 0–45)
ATRIAL RATE - MUSE: 80 BPM
ATRIAL RATE - MUSE: NORMAL BPM
ATRIAL RATE - MUSE: NORMAL BPM
BASOPHILS # BLD AUTO: 0 10E3/UL (ref 0–0.2)
BASOPHILS NFR BLD AUTO: 1 %
BILIRUB SERPL-MCNC: 0.8 MG/DL
BUN SERPL-MCNC: 28 MG/DL (ref 8–23)
BURR CELLS BLD QL SMEAR: SLIGHT
CALCIUM SERPL-MCNC: 8.7 MG/DL (ref 8.8–10.4)
CHLORIDE SERPL-SCNC: 109 MMOL/L (ref 98–107)
CREAT SERPL-MCNC: 2.04 MG/DL (ref 0.51–0.95)
D DIMER PPP FEU-MCNC: 1.91 UG/ML FEU (ref 0–0.5)
DIASTOLIC BLOOD PRESSURE - MUSE: NORMAL MMHG
EGFRCR SERPLBLD CKD-EPI 2021: 23 ML/MIN/1.73M2
EOSINOPHIL # BLD AUTO: 0 10E3/UL (ref 0–0.7)
EOSINOPHIL NFR BLD AUTO: 0 %
ERYTHROCYTE [DISTWIDTH] IN BLOOD BY AUTOMATED COUNT: 18.1 % (ref 10–15)
GLUCOSE BLDC GLUCOMTR-MCNC: 87 MG/DL (ref 70–99)
GLUCOSE SERPL-MCNC: 87 MG/DL (ref 70–99)
HCO3 SERPL-SCNC: 20 MMOL/L (ref 22–29)
HCT VFR BLD AUTO: 35 % (ref 35–47)
HGB BLD-MCNC: 10.3 G/DL (ref 11.7–15.7)
IMM GRANULOCYTES # BLD: 0.1 10E3/UL
IMM GRANULOCYTES NFR BLD: 1 %
INR PPP: 1.43 (ref 0.85–1.15)
INTERPRETATION ECG - MUSE: NORMAL
LVEF ECHO: NORMAL
LVEF ECHO: NORMAL
LYMPHOCYTES # BLD AUTO: 1.4 10E3/UL (ref 0.8–5.3)
LYMPHOCYTES NFR BLD AUTO: 16 %
MAGNESIUM SERPL-MCNC: 2 MG/DL (ref 1.7–2.3)
MCH RBC QN AUTO: 29.7 PG (ref 26.5–33)
MCHC RBC AUTO-ENTMCNC: 29.4 G/DL (ref 31.5–36.5)
MCV RBC AUTO: 101 FL (ref 78–100)
MONOCYTES # BLD AUTO: 2.3 10E3/UL (ref 0–1.3)
MONOCYTES NFR BLD AUTO: 27 %
NEUTROPHILS # BLD AUTO: 4.9 10E3/UL (ref 1.6–8.3)
NEUTROPHILS NFR BLD AUTO: 56 %
NRBC # BLD AUTO: 0 10E3/UL
NRBC BLD AUTO-RTO: 0 /100
P AXIS - MUSE: 113 DEGREES
P AXIS - MUSE: NORMAL DEGREES
P AXIS - MUSE: NORMAL DEGREES
PLAT MORPH BLD: ABNORMAL
PLATELET # BLD AUTO: 97 10E3/UL (ref 150–450)
POLYCHROMASIA BLD QL SMEAR: SLIGHT
POTASSIUM SERPL-SCNC: 3.5 MMOL/L (ref 3.4–5.3)
PR INTERVAL - MUSE: 160 MS
PR INTERVAL - MUSE: NORMAL MS
PR INTERVAL - MUSE: NORMAL MS
PROT SERPL-MCNC: 6.3 G/DL (ref 6.4–8.3)
PROTHROMBIN TIME: 17.7 SECONDS (ref 11.8–14.8)
QRS DURATION - MUSE: 140 MS
QRS DURATION - MUSE: 86 MS
QRS DURATION - MUSE: 88 MS
QT - MUSE: 374 MS
QT - MUSE: 388 MS
QT - MUSE: 504 MS
QTC - MUSE: 474 MS
QTC - MUSE: 495 MS
QTC - MUSE: 528 MS
R AXIS - MUSE: -87 DEGREES
R AXIS - MUSE: 33 DEGREES
R AXIS - MUSE: 41 DEGREES
RBC # BLD AUTO: 3.47 10E6/UL (ref 3.8–5.2)
RBC MORPH BLD: ABNORMAL
SODIUM SERPL-SCNC: 144 MMOL/L (ref 135–145)
SYSTOLIC BLOOD PRESSURE - MUSE: NORMAL MMHG
T AXIS - MUSE: 194 DEGREES
T AXIS - MUSE: 201 DEGREES
T AXIS - MUSE: 93 DEGREES
TROPONIN I BLD-MCNC: 0.03 UG/L
TROPONIN T SERPL HS-MCNC: 31 NG/L
TROPONIN T SERPL HS-MCNC: 33 NG/L
VENTRICULAR RATE- MUSE: 66 BPM
VENTRICULAR RATE- MUSE: 97 BPM
VENTRICULAR RATE- MUSE: 98 BPM
WBC # BLD AUTO: 8.7 10E3/UL (ref 4–11)

## 2025-04-30 PROCEDURE — 85025 COMPLETE CBC W/AUTO DIFF WBC: CPT | Performed by: INTERNAL MEDICINE

## 2025-04-30 PROCEDURE — 250N000009 HC RX 250

## 2025-04-30 PROCEDURE — A9540 TC99M MAA: HCPCS | Performed by: INTERNAL MEDICINE

## 2025-04-30 PROCEDURE — 93005 ELECTROCARDIOGRAM TRACING: CPT

## 2025-04-30 PROCEDURE — 76376 3D RENDER W/INTRP POSTPROCES: CPT

## 2025-04-30 PROCEDURE — 83735 ASSAY OF MAGNESIUM: CPT | Performed by: INTERNAL MEDICINE

## 2025-04-30 PROCEDURE — 78582 LUNG VENTILAT&PERFUS IMAGING: CPT

## 2025-04-30 PROCEDURE — 84484 ASSAY OF TROPONIN QUANT: CPT

## 2025-04-30 PROCEDURE — 92960 CARDIOVERSION ELECTRIC EXT: CPT

## 2025-04-30 PROCEDURE — 71250 CT THORAX DX C-: CPT

## 2025-04-30 PROCEDURE — 83540 ASSAY OF IRON: CPT | Performed by: INTERNAL MEDICINE

## 2025-04-30 PROCEDURE — 343N000001 HC RX 343 MED OP 636: Performed by: INTERNAL MEDICINE

## 2025-04-30 PROCEDURE — 84484 ASSAY OF TROPONIN QUANT: CPT | Performed by: INTERNAL MEDICINE

## 2025-04-30 PROCEDURE — 93306 TTE W/DOPPLER COMPLETE: CPT | Mod: 26 | Performed by: INTERNAL MEDICINE

## 2025-04-30 PROCEDURE — 85610 PROTHROMBIN TIME: CPT | Performed by: INTERNAL MEDICINE

## 2025-04-30 PROCEDURE — 71250 CT THORAX DX C-: CPT | Mod: 26 | Performed by: RADIOLOGY

## 2025-04-30 PROCEDURE — 93005 ELECTROCARDIOGRAM TRACING: CPT | Performed by: INTERNAL MEDICINE

## 2025-04-30 PROCEDURE — 93306 TTE W/DOPPLER COMPLETE: CPT

## 2025-04-30 PROCEDURE — 85379 FIBRIN DEGRADATION QUANT: CPT | Performed by: INTERNAL MEDICINE

## 2025-04-30 PROCEDURE — 258N000003 HC RX IP 258 OP 636: Performed by: INTERNAL MEDICINE

## 2025-04-30 PROCEDURE — 82962 GLUCOSE BLOOD TEST: CPT

## 2025-04-30 PROCEDURE — 250N000011 HC RX IP 250 OP 636: Mod: JZ | Performed by: INTERNAL MEDICINE

## 2025-04-30 PROCEDURE — 82565 ASSAY OF CREATININE: CPT | Performed by: INTERNAL MEDICINE

## 2025-04-30 PROCEDURE — 93325 DOPPLER ECHO COLOR FLOW MAPG: CPT

## 2025-04-30 PROCEDURE — A9567 TECHNETIUM TC-99M AEROSOL: HCPCS | Performed by: INTERNAL MEDICINE

## 2025-04-30 PROCEDURE — 999N000104 CT THORACIC SPINE RECONSTRUCTED

## 2025-04-30 PROCEDURE — 78582 LUNG VENTILAT&PERFUS IMAGING: CPT | Mod: 26 | Performed by: RADIOLOGY

## 2025-04-30 PROCEDURE — 99285 EMERGENCY DEPT VISIT HI MDM: CPT | Mod: 25 | Performed by: INTERNAL MEDICINE

## 2025-04-30 PROCEDURE — 99284 EMERGENCY DEPT VISIT MOD MDM: CPT | Performed by: INTERNAL MEDICINE

## 2025-04-30 PROCEDURE — 370N000017 HC ANESTHESIA TECHNICAL FEE, PER MIN

## 2025-04-30 PROCEDURE — 93010 ELECTROCARDIOGRAM REPORT: CPT | Performed by: INTERNAL MEDICINE

## 2025-04-30 PROCEDURE — 250N000009 HC RX 250: Performed by: INTERNAL MEDICINE

## 2025-04-30 PROCEDURE — 36415 COLL VENOUS BLD VENIPUNCTURE: CPT | Performed by: INTERNAL MEDICINE

## 2025-04-30 PROCEDURE — 82728 ASSAY OF FERRITIN: CPT | Performed by: INTERNAL MEDICINE

## 2025-04-30 PROCEDURE — 72128 CT CHEST SPINE W/O DYE: CPT | Mod: 26 | Performed by: RADIOLOGY

## 2025-04-30 RX ORDER — NALOXONE HYDROCHLORIDE 0.4 MG/ML
0.4 INJECTION, SOLUTION INTRAMUSCULAR; INTRAVENOUS; SUBCUTANEOUS
Status: DISCONTINUED | OUTPATIENT
Start: 2025-04-30 | End: 2025-05-01 | Stop reason: HOSPADM

## 2025-04-30 RX ORDER — LIDOCAINE HYDROCHLORIDE 20 MG/ML
15 SOLUTION OROPHARYNGEAL ONCE
Status: COMPLETED | OUTPATIENT
Start: 2025-04-30 | End: 2025-04-30

## 2025-04-30 RX ORDER — NALOXONE HYDROCHLORIDE 0.4 MG/ML
0.2 INJECTION, SOLUTION INTRAMUSCULAR; INTRAVENOUS; SUBCUTANEOUS
Status: DISCONTINUED | OUTPATIENT
Start: 2025-04-30 | End: 2025-05-01 | Stop reason: HOSPADM

## 2025-04-30 RX ORDER — FLUMAZENIL 0.1 MG/ML
0.2 INJECTION, SOLUTION INTRAVENOUS
Status: DISCONTINUED | OUTPATIENT
Start: 2025-04-30 | End: 2025-05-01 | Stop reason: HOSPADM

## 2025-04-30 RX ORDER — ACYCLOVIR 200 MG/1
9.5 CAPSULE ORAL
Status: DISCONTINUED | OUTPATIENT
Start: 2025-04-30 | End: 2025-05-01 | Stop reason: HOSPADM

## 2025-04-30 RX ORDER — FENTANYL CITRATE 50 UG/ML
25 INJECTION, SOLUTION INTRAMUSCULAR; INTRAVENOUS
Status: DISCONTINUED | OUTPATIENT
Start: 2025-04-30 | End: 2025-05-01 | Stop reason: HOSPADM

## 2025-04-30 RX ORDER — SODIUM CHLORIDE 9 MG/ML
1000 INJECTION, SOLUTION INTRAVENOUS CONTINUOUS
Status: DISCONTINUED | OUTPATIENT
Start: 2025-04-30 | End: 2025-05-01 | Stop reason: HOSPADM

## 2025-04-30 RX ADMIN — FENTANYL CITRATE 25 MCG: 50 INJECTION, SOLUTION INTRAMUSCULAR; INTRAVENOUS at 15:26

## 2025-04-30 RX ADMIN — SODIUM CHLORIDE 200 ML: 0.9 INJECTION, SOLUTION INTRAVENOUS at 16:24

## 2025-04-30 RX ADMIN — KIT FOR THE PREPARATION OF TECHNETIUM TC 99M ALBUMIN AGGREGATED 7 MILLICURIE: 2.5 INJECTION, POWDER, FOR SOLUTION INTRAVENOUS at 13:55

## 2025-04-30 RX ADMIN — TOPICAL ANESTHETIC 0.5 ML: 200 SPRAY DENTAL; PERIODONTAL at 15:15

## 2025-04-30 RX ADMIN — KIT FOR THE PREPARATION OF TECHNETIUM TC 99M PENTETATE 2 MILLICURIE: 20 INJECTION, POWDER, LYOPHILIZED, FOR SOLUTION INTRAVENOUS; RESPIRATORY (INHALATION) at 13:32

## 2025-04-30 RX ADMIN — LIDOCAINE HYDROCHLORIDE 30 ML: 20 SOLUTION ORAL at 15:00

## 2025-04-30 RX ADMIN — MIDAZOLAM 1 MG: 1 INJECTION INTRAMUSCULAR; INTRAVENOUS at 15:26

## 2025-04-30 RX ADMIN — METHOHEXITAL SODIUM 30 MG: 500 INJECTION, POWDER, LYOPHILIZED, FOR SOLUTION INTRAMUSCULAR; INTRAVENOUS; RECTAL at 15:59

## 2025-04-30 ASSESSMENT — ACTIVITIES OF DAILY LIVING (ADL)
ADLS_ACUITY_SCORE: 61

## 2025-04-30 ASSESSMENT — COPD QUESTIONNAIRES: COPD: 0

## 2025-04-30 ASSESSMENT — ENCOUNTER SYMPTOMS: DYSRHYTHMIAS: 1

## 2025-04-30 ASSESSMENT — LIFESTYLE VARIABLES: TOBACCO_USE: 1

## 2025-04-30 NOTE — ANESTHESIA CARE TRANSFER NOTE
Patient: Tessa Mansfield    Procedure: Procedure(s):  Anesthesia cardioversion       Diagnosis: Atrial fibrillation, unspecified type (H) [I48.91]  Diagnosis Additional Information: No value filed.    Anesthesia Type:   No value filed.     Note:    Oropharynx: oropharynx clear of all foreign objects and spontaneously breathing  Level of Consciousness: drowsy  Oxygen Supplementation: nasal cannula  Level of Supplemental Oxygen (L/min / FiO2): 2  Independent Airway: airway patency satisfactory and stable  Dentition: dentition unchanged      Patient transferred to: Cardiac [facility]          Vitals:  Vitals Value Taken Time   /80    Temp     Pulse 65    Resp     SpO2 100        Electronically Signed By: JI Butler CRNA  April 30, 2025  4:07 PM

## 2025-04-30 NOTE — DISCHARGE INSTRUCTIONS
Please make an appointment to follow up with Your Primary Care Provider and Cardiology Clinic (phone: [phone]) as soon as possible even if entirely better.

## 2025-04-30 NOTE — PRE-PROCEDURE
GENERAL PRE-PROCEDURE:   Procedure:  Transesophageal echocardiogram and direct current cardioversion  Date/Time:  4/30/2025 3:22 PM    Verbal consent obtained?: Yes    Written consent obtained?: Yes    Risks and benefits: Risks, benefits and alternatives were discussed    Consent given by:  Patient  Patient states understanding of procedure being performed: Yes    Patient's understanding of procedure matches consent: Yes    Procedure consent matches procedure scheduled: Yes    Expected level of sedation:  Moderate  Appropriately NPO:  Yes  ASA Class:  3  Mallampati  :  Grade 2- soft palate, base of uvula, tonsillar pillars, and portion of posterior pharyngeal wall visible  Lungs:  Lungs clear with good breath sounds bilaterally  Heart:  A-fib  History & Physical reviewed:  History and physical reviewed and no updates needed  Statement of review:  I have reviewed the lab findings, diagnostic data, medications, and the plan for sedation

## 2025-04-30 NOTE — DISCHARGE INSTRUCTIONS
GOING HOME AFTER YOUR TRANSESOPHAGEAL ECHOCARDIOGRAM AND CARDIOVERSION    FOR NEXT 24 HOURS:  An adult should stay with you.  Relax and take it easy.  DO NOT make any important legal decisions.  DO NOT drive or operate machines at home or at work.  DO NOT consume any alcohol today.  Resume your regular diet 2 HOURS after procedure @ __________ and drink plenty of fluids.  If your throat is sore, eat cold, bland, soft foods.  If you have any redness/skin sorness where the patches were placed, you may use aloe vera gel or 1% hydrocortisone cream to the skin (sold at drug stores)  Take Tylenol (Acetaminophen) per your provider's recommendations as needed to help relieve local pain.     CALL YOUR HEALTHCARE PROVIDER IF:  New pain or trouble swallowing  New stomach or chest pain  You develop nausea or vomiting  Fever above 100.6 F or greater  You develop hives or a rash or any unexplained itching  Have irregular heartbeat or fast pulse  Feel faint, dizzy, or lightheaded  Have chest pain with increased activity  Have bleeding issues from blood-thinning medicines (vomiting that looks like coffee grounds or contains blood OR black, bloody stools).    CALL 911 RIGHT AWAY IF YOU HAVE:  Pain in your chest, arm, shoulder, neck, or upper back  Shortness of breath  Loss of vision, speech, or strength or coordination in any body part  Weakness in your arm or leg  Uncontrolled bleeding  Feel unstable in any way     FOLLOW UP APPOINTMENT:    Follow up as scheduled with EP/Cardiology Provider     ADDITIONAL INFORMATION:    If you have any questions:        Call the Echo Lab Nurse at [phone], press 4 (Monday-Friday 7:00 am - 4:00 pm)        Call the hospital: [phone] and ask them to page [phone] (after 4:00 pm and on   weekends or holidays)      Cardiovascular Clinic:   [address] [place] [address]. [place] [address]  Your Care Team:  EP Cardiology   Telephone Number     Siobhan Sears RN [phone]    After  business hours: [phone], select option 4, ask for EP Fellow on call to be paged.     For scheduling appts or procedures:    Mary Jeter   [phone]   For the Device Clinic (Pacemakers and ICD's)   RN's  During business hours: [phone]     After business hours:   [phone]- select option 4 and ask for job code [id number].       As always, Thank you for trusting us with your health care needs!

## 2025-04-30 NOTE — PROGRESS NOTES
Pt arrived in ECHO department [facility] ED for scheduled MICHELL/DCCV.   Procedure explained, questions answered and consent signed. Discharge instructions discussed with patient. Pt confirmed she took her Pradaxa this morning.   Pt's throat sprayed at 1500, therefore pt will not be able to eat or drink until 2 hours after at 1700. Informed pt of this time and encouraged to start with warm fluids and soft foods.    Pt tolerated procedure well, and was given a total of 25 mcg IV fentanyl and 1 mg IV versed for conscious sedation.   MICHELL probe 67 used for procedure and inserted without difficulty.   No clots visualized on MICHELL so proceeded with DCCV. Anesthesia gave pt 30 mg IV brevital for sedation and pt was DCCV at 150 Joules to a Paced rhythm. Post EKG completed. .  Pt denied chest or throat pain after procedure and was monitored until awake and VSS. Escorted back to ED via stretcher and hospital transport.    Report given to BREEZY Rene RN.

## 2025-04-30 NOTE — ANESTHESIA PREPROCEDURE EVALUATION
Anesthesia Pre-Procedure Evaluation    Patient: Tessa Mansfield   MRN: [id number] : 1935        Procedure : Procedure(s):  Anesthesia cardioversion          Past Medical History:   Diagnosis Date    CAD (coronary artery disease)     CAD s/p PCI+BMS to MRCA 10/2002, PCI to mLCX 2003, PCI+DESx2 to pLAD 2007, PCI+DESx1 to dRCA 2007, PCI+ALVAREZ to RPDA 2013; on indefinite DAPT  10/27/2002    10/27/2002: AMI s/p PCI+BMS (3.5x18mm Bx velocity) to mRCA 2003: Back pain; PCI+stent to mLCX c/b distal embolization/slow flow 2003: Unstable angina; PCI+stent (Hepacoat velocity) to mLAD 2007: PCI+DESx2 to pLAD (IVUS w/ calcification of LMCA and ulcerated plaque pLAD, 80% dRCA; also had 80% dLCX, too small to stent) 2007: Staged PCI. PCI+DESx1 (3.5x13mm Cypher) to dRCA (indefinite DAPT recommended at this time) 2008: Angina. mLCX stent 70% ISR. LAD and RCA stents patent. Myocardium at risk from LCX felt to be small, medical management preferred to PCI. 11/10/2009: Angina. Findings unchanged from 2008, FFR LAD 0.90. Medical management recommended. 2013: Unstable angina; PCI+ALVAREZ to mPDA. Diagnostic findings: LMCA 40% distal. LAD: pLAD stents patent mLAD 30% ISR dLAD 50% diffuse, D2 diffuse disease. LCX 80% mid ISR. RCA diffuse <30% mid, RPLAs diffuse disease, RPDA 100% occlusion.      Cardiac Pacemaker- Medtronic, dual chamber- NOT dependant 2007    CKD (chronic kidney disease), stage IV (H)     Hyperlipidemia LDL goal <70     Hypertension     Stented coronary artery 10-    RCA    Stented coronary artery 2003    LCx    Stented coronary artery 2003    LAD    Stented coronary artery 2007    LAD    Stented coronary artery 2007    RCA    Transient complete heart block (H) 2007      Past Surgical History:   Procedure Laterality Date    CHOLECYSTECTOMY      CV CORONARY ANGIOGRAM N/A 2022    Procedure: Coronary Angiogram;  Surgeon: Gilberto  MD Constantine;  Location: [facility] CARDIAC CATH LAB    CV CORONARY ANGIOGRAM N/A 7/17/2023    Procedure: Coronary Angiogram;  Surgeon: Constantine Macias MD;  Location: [facility] CARDIAC CATH LAB    CV LEFT ATRIAL APPENDAGE CLOSURE N/A 8/8/2024    Procedure: Left Atrial Appendage Closure;  Surgeon: Rodrick Garcia MD;  Location: [facility] CARDIAC CATH LAB    CV PCI N/A 6/17/2022    Procedure: Percutaneous Coronary Intervention;  Surgeon: Constantine Macias MD;  Location: [facility] CARDIAC CATH LAB    CV PCI N/A 7/17/2023    Procedure: Percutaneous Coronary Intervention;  Surgeon: Constantine Macias MD;  Location: [facility] CARDIAC CATH LAB    CV PCI N/A 11/6/2023    Procedure: Percutaneous Coronary Intervention;  Surgeon: Constantine Macias MD;  Location: [facility] CARDIAC CATH LAB    CV PERICARDIOCENTESIS N/A 8/8/2024    Procedure: Pericardiocentesis;  Surgeon: Rodrick Garcia MD;  Location: [facility] CARDIAC CATH LAB    CV PERICARDIOCENTESIS N/A 8/11/2024    Procedure: Pericardial Drain Adjustment, Possible New Pericardial Drain Placement;  Surgeon: Rodrick Garcia MD;  Location: [facility] CARDIAC CATH LAB    CV RIGHT HEART CATH MEASUREMENTS RECORDED N/A 6/17/2022    Procedure: Right Heart Catheterization;  Surgeon: Constantine Macias MD;  Location: [facility] CARDIAC CATH LAB    EP PACEMAKER N/A 10/15/2019    Procedure: EP PACEMAKER;  Surgeon: Anthony Zhu MD;  Location: [facility] CARDIAC CATH LAB    ESOPHAGOSCOPY, GASTROSCOPY, DUODENOSCOPY (EGD), COMBINED N/A 7/20/2023    Procedure: Esophagoscopy, gastroscopy, duodenoscopy (EGD), combined;  Surgeon: Bekah Dash DO;  Location:  GI    ESOPHAGOSCOPY, GASTROSCOPY, DUODENOSCOPY (EGD), COMBINED N/A 5/2/2024    Procedure: Esophagoscopy, gastroscopy, duodenoscopy (EGD), combined;  Surgeon: Tavo Donaldson MD;  Location:  GI    HC PPM INSERTION NEW/REPLACEMENT W/ ATRIAL&VENTRICULAR LEAD  11-    HYSTERECTOMY       "LAPAROTOMY EXPLORATORY N/A 4/13/2024    Procedure: Laparotomy exploratory, Wound Vac Placement.;  Surgeon: Anthony Trammell MD;  Location: [facility] OR    LAPAROTOMY EXPLORATORY N/A 4/14/2024    Procedure: Abdominal Re-Exploration and Closure;  Surgeon: Anthony Trammell MD;  Location: [facility] OR    LAPAROTOMY EXPLORATORY N/A 4/13/2024    Procedure: Exploratory Laparotomy;  Surgeon: Anthony Trammell MD;  Location: [facility] OR      Allergies   Allergen Reactions    Shrimp Swelling    Codeine Sulfate Itching    Indomethacin      Other Reaction(s): Not available    Levofloxacin Hemihydrate      Other Reaction(s): Not available    Morphine Sulfate      Other Reaction(s): Not available    Oxycodone Other (See Comments)    Shrimp Extract Swelling    Sulfamethoxazole W/Trimethoprim      Other Reaction(s): Not available    Chlorthalidone Nausea and Vomiting     Other Reaction(s): Not available    Clarithromycin      Other Reaction(s): Not available    Clonidine      Other Reaction(s): Not available    Darvon [Propoxyphene Hcl]     Gabapentin Confusion and Fatigue     \"felt drunk\"    Other Reaction(s): Not available    Hydromorphone Hallucination and Visual Disturbance     Tolerated in April 2024 hospital admissions    Other Reaction(s): Not available    Indomethacin     Percocet [Oxycodone-Acetaminophen] Hallucination    Pregabalin Fatigue and Itching    Simvastatin Palpitations and Cramps     Muscle weakness, leg cramping    Spironolactone      Dehydrated    Other Reaction(s): Not available    Terazosin      Other Reaction(s): Not available      Social History     Tobacco Use    Smoking status: Former     Current packs/day: 0.30     Average packs/day: 0.3 packs/day for 15.0 years (4.5 ttl pk-yrs)     Types: Cigarettes    Smokeless tobacco: Never    Tobacco comments:     Quit 35+ years ago   Substance Use Topics    Alcohol use: Not on file      Wt Readings from Last 1 Encounters:   04/07/25 68 kg (150 lb)        Anesthesia " Evaluation   Pt has had prior anesthetic. Type: General.        ROS/MED HX  ENT/Pulmonary:     (+) sleep apnea,               tobacco use, Past use,    Moderate Persistent, asthma  Treatment: Inhaler daily,              (-) COPD   Neurologic:  - neg neurologic ROS  (-) no CVA   Cardiovascular: Comment: CAD s/p multiple stents (most recent 11/2023)  Sick sinus syndrom s/p dual PPM   Paroxysmal atrial fibrillation  Mild-moderate mitral regurgitation     Device Check (5/2024)  Device: Medtronic [id number] Abbott XT DR MRI  Normal Device Function  Mode: AAIR<=>DDDR  bpm   AP: 27.5%  : 0.1%  Presenting EGM: AP-VS with bigeminal PVCs  Short V-V intervals: 0  Lead Trends Appear Stable: Yes  Estimated battery longevity to RRT = 9.1 years. Battery voltage = 3.01 V.   Atrial Arrhythmia: 3  AT/AF episodes recorded - 28 sec-55 hrs 18 min.  AF Lagro: 6.6%  Anticoagulant: Eliquis  1 Fast A & V episode recorded - 7 sec, 162 bpm. Stored EGM reveals AF with RVR.  Ventricular Arrhythmia: 1 episode recorded as NSVT - 2 sec, 174 bpm. Stored EGM reveals 1:1 AV conduction suggestive of SVT.  PVC Singles = 246.6/hour  PVC Runs = 50.9/hour      (+) Dyslipidemia hypertension- -  CAD angina- past MI - stent-7/17/23. 3 Drug Eluting Stent. Taking blood thinners   CHF  Last EF: 60%           ICD Reason placed:AP: 70.5% : <0.1% Presenting EGM: NSR with PVCs @ 92 bpm Short V-V intervals: 0 Lead Trends Appear Stable: Yes Estimated battery longevity to RRT = 9.2 years. Battery voltage = 3.00 V. Atrial Arrhythmia: Total AT/AF time = 9 seconds. AF Lagro: <0.1% Anticoagulant: Eliquis Ventricular Arrhythmia: 0.  type;Medtronic [id number] Abbott XT DR MRI Normal Device Function Settings AAIR<=>DDDR  bpm - Patient is dependent on ICD. dysrhythmias, a-fib and Sick Sinus Syndrome,        Previous cardiac testing   Echo: Date: 10/23 Results:  Left Ventricle  Global and regional left ventricular function is normal with an EF of 55-60%.  Left  ventricular wall thickness is normal. Left ventricular size is normal.  Left ventricular diastolic function is not assessable. No regional wall motion  abnormalities are seen.     Right Ventricle  Right ventricular function, chamber size, wall motion, and thickness are  normal. A pacemaker lead is noted in the right ventricle.     Atria  The atria cannot be assessed.     Mitral Valve  Mild to moderate mitral insufficiency is present.     Aortic Valve  Aortic valve sclerosis is present.     Tricuspid Valve  Mild tricuspid insufficiency is present. The right ventricular systolic  pressure is 35mmHg above the right atrial pressure.     Pulmonic Valve  The pulmonic valve is normal. Mild pulmonic insufficiency is present.     Vessels  The thoracic aorta cannot be assessed. The pulmonary artery cannot be  assessed. The inferior vena cava is normal.     Pericardium  No pericardial effusion is present.    Stress Test:  Date: Results:    ECG Reviewed:  Date: 7/3/24 Results:    Atrial-paced rhythm with prolonged AV conduction      Cath:  Date: 11/6/23 Results:  Left Anterior Descending  The vessel is small.  Ost LAD to Prox LAD lesion is 50% stenosed. The lesion was previously treated using a stent of unknown type.  Prox LAD to Mid LAD lesion is 40% stenosed. The lesion was previously treated using a stent of unknown type.  Mid LAD lesion is 70% stenosed.    First Diagonal Branch  The vessel is small. There is moderate diffuse disease throughout the vessel.  1st Diag lesion is 85% stenosed.    Second Diagonal Branch  2nd Diag lesion is 70% stenosed.    Left Circumflex  The vessel is small.  Prox Cx to Mid Cx lesion is 55% stenosed. The lesion was previously treated using a stent of unknown type.    First Obtuse Marginal Branch  The vessel is large.    Second Obtuse Marginal Branch  The vessel is small.    Right Coronary Artery  The vessel is large.  Previously placed Mid RCA to Dist RCA drug-eluting and unknown type of stents  are widely patent. The lesion was previously treated using angioplasty.  Previously placed Dist RCA drug-eluting and unknown type of stents are widely patent. The lesion was previously treated using angioplasty.    Right Posterior Descending Artery  There is mild diffuse disease throughout the vessel.  RPDA-1 lesion is 65% stenosed.  RPDA-2 lesion is 50% stenosed. The lesion was previously treated using a stent of unknown type.    Right Posterior Atrioventricular Artery  There is mild diffuse disease throughout the vessel.      Intervention    Mid LAD lesion  Stent  The pre-interventional distal flow is normal (BREANNA 3). A STENT CORONARY ALVAREZ SYNERGY XD MR US [id number] [id number] drug eluting stent was successfully placed. Post-stent angioplasty was performed. The post-interventional distal flow is normal (BREANNA 3).  There is a 0% residual stenosis post intervention.          METS/Exercise Tolerance:     Hematologic:     (+)      anemia, history of blood transfusion, no previous transfusion reaction,        Musculoskeletal:   (+)  arthritis,             GI/Hepatic: Comment: SBO sp ex lap x3 4/13.   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 s/p clips 7/2023  Colonic polyps  Diverticulosis       (+) GERD,                   Renal/Genitourinary:     (+) renal disease, type: CRI, Pt does not require dialysis,        (-) BPH   Endo:  - neg endo ROS     Psychiatric/Substance Use:  - neg psychiatric ROS     Infectious Disease:  - neg infectious disease ROS     Malignancy:  - neg malignancy ROS     Other:            Physical Exam    Airway        Mallampati: II   TM distance: < 3 FB   Neck ROM: full   Mouth opening: > 3 cm    Respiratory Devices and Support         Dental       (+) Minor Abnormalities - some fillings, tiny chips      Cardiovascular          Rhythm and rate: irregular and normal     Pulmonary           breath sounds clear to auscultation            OUTSIDE LABS:  CBC:   Lab Results   Component Value Date    WBC 8.7 04/30/2025    WBC 8.3 04/08/2025    HGB 10.3 (L) 04/30/2025    HGB 10.1 (L) 04/08/2025    HCT 35.0 04/30/2025    HCT 31.1 (L) 04/08/2025    PLT 97 (L) 04/30/2025     04/08/2025     BMP:   Lab Results   Component Value Date     04/30/2025     04/08/2025    POTASSIUM 3.5 04/30/2025    POTASSIUM 3.4 04/08/2025    CHLORIDE 109 (H) 04/30/2025    CHLORIDE 110 (H) 04/08/2025    CO2 20 (L) 04/30/2025    CO2 19 (L) 04/08/2025    BUN 28.0 (H) 04/30/2025    BUN 40.0 (H) 04/08/2025    CR 2.04 (H) 04/30/2025    CR 1.83 (H) 04/08/2025    GLC 87 04/30/2025     (H) 04/08/2025     COAGS:   Lab Results   Component Value Date    PTT 35 08/09/2024    INR 1.43 (H) 04/30/2025    FIBR 308 08/09/2024     POC:   Lab Results   Component Value Date    BGM 86 01/31/2006     HEPATIC:   Lab Results   Component Value Date    ALBUMIN 3.7 04/30/2025    PROTTOTAL 6.3 (L) 04/30/2025    ALT 28 04/30/2025    AST 36 04/30/2025    ALKPHOS 145 04/30/2025    BILITOTAL 0.8 04/30/2025     OTHER:   Lab Results   Component Value Date    PH 7.35 08/08/2024    LACT 1.8 04/01/2025    ALEXANDRO 8.7 (L) 04/30/2025    PHOS 3.6 02/07/2025    MAG 2.0 04/30/2025    LIPASE 170 (H) 07/03/2024    TSH 3.28 09/29/2021    CRP 3.5 10/01/2021    SED 49 (H) 04/01/2025       Anesthesia Plan    ASA Status:  3    NPO Status:  NPO Appropriate    Anesthesia Type: MAC.     - Reason for MAC: straight local not clinically adequate              Consents    Anesthesia Plan(s) and associated risks, benefits, and realistic alternatives discussed. Questions answered and patient/representative(s) expressed understanding.     - Discussed:     - Discussed with:  Patient            Postoperative Care            Comments:    Other Comments: Presented for cardioversion.  Complained of left shoulder and back pain on arrival to echo lab, sent to ED for eval.  Negative troponin and PE studies.            Gaby Rincon MD    Clinically Significant Risk Factors Present on Admission          # Hyperchloremia: Highest Cl = 109 mmol/L in last 2 days, will monitor as appropriate           # Drug Induced Coagulation Defect: home medication list includes an anticoagulant medication  # Drug Induced Platelet Defect: home medication list includes an antiplatelet medication   # Hypertension: Noted on problem list  # Chronic heart failure with preserved ejection fraction: heart failure noted on problem list and last echo with EF >50%     # Anemia: based on hgb <11           # Financial/Environmental Concerns:     # Pacemaker present

## 2025-04-30 NOTE — ANESTHESIA POSTPROCEDURE EVALUATION
Patient: Tessa Mansfield    Procedure: Procedure(s):  Anesthesia cardioversion       Anesthesia Type:  No value filed.    Note:  Disposition: Outpatient   Postop Pain Control: Uneventful            Sign Out: Well controlled pain   PONV: No   Neuro/Psych: Uneventful            Sign Out: Acceptable/Baseline neuro status   Airway/Respiratory: Uneventful            Sign Out: Acceptable/Baseline resp. status   CV/Hemodynamics: Uneventful            Sign Out: Acceptable CV status; No obvious hypovolemia; No obvious fluid overload   Other NRE: NONE   DID A NON-ROUTINE EVENT OCCUR? No    Event details/Postop Comments:  Patient awake and answering questions appropriately.  Care transferred to echo lab RN           Last vitals:  Vitals:    04/30/25 1300 04/30/25 1330 04/30/25 1430   BP: (!) 141/100 (!) 143/92 (!) 148/83   Pulse: 103 87 87   Resp:      Temp:      SpO2: 98% 100% 96%       Electronically Signed By: Gaby Rincon MD  April 30, 2025  4:14 PM

## 2025-04-30 NOTE — ED TRIAGE NOTES
Arrived via wheelchair from echo lab. Pt was originally supposed to be cardioverted due to Afib, procedure was not completed. Pt sent to ER due to back and shoulder pain on left side, worse with activity. Pt has history of heart attack in 2007   Triage Assessment (Adult)       Row Name 04/30/25 0812          Triage Assessment    Airway WDL WDL        Respiratory WDL    Respiratory WDL WDL        Skin Circulation/Temperature WDL    Skin Circulation/Temperature WDL WDL        Cardiac WDL    Cardiac WDL X        Peripheral/Neurovascular WDL    Peripheral Neurovascular WDL WDL        Cognitive/Neuro/Behavioral WDL    Cognitive/Neuro/Behavioral WDL WDL

## 2025-04-30 NOTE — PROGRESS NOTES
Pt presented to echo dept for scheduled MICHELL/DCCV. While getting EKG done pt reported 10/10 shoulder and back pain, similar to the pain pt felt when she had a heart attack. No ST changes seen on EKG. EP JERMAINE Mendoza notified who assessed pt at bedside.    Pt sent to ED for further workup. Report called to ED charge RN.    Spouse updated to plan of care and accompanied pt to ED.    Tari Kamara RN

## 2025-04-30 NOTE — ED PROVIDER NOTES
ED Provider Note  [facility]      History     Chief Complaint   Patient presents with    Back Pain     HPI  Tessa Mansfield is a 89 year old female who  has a past history of HTN, CKD4, CAD s/p multiple stents, and Afib s/p Watchman on 8/4/24 anticoagulated on Plavix who presents to the ED with a complaint of 3 or 4 days of progressive thoracic back pain. She arrived for a cardioversion appointment this morning for her Afib with RVR, and upon mentioning the back pain and expressing concern that she had interscapular back pain with her MI in 2007, she was brought to the ED by wheelchair for further evaluation. She reports that the pain started insidiously about 3 or 4 days ago and that it caused her to miss a dermatology appointment earlier this week. She has difficulty explaining the timeline of events. She states that the current thoracic back pain she has is a point stabbing pain that improves with extension and compression of her back and with heat, and she denies any aggravating factors. She rates the pain as a 5/10 at baseline with some increase in pain with palpation. She endorses a fall with injury some time ago, per chart review, this occurred 1 month ago, and she denies any falls or injuries since then. She reports that the thoracic/interscapular back pain she had with her heart attack was a diffuse ache or pressure throughout the upper thoracic region and accompanied by a similar pressure and ache across her upper chest. She denies chest pain with her current complaint. She endorses palpitations at night that have been ongoing for quite some time that are consistent with her Afib with RVR, and exercise intolerance for the past several months, but no new cardiac complaints with the development of this pain. She denies injuries, falls, chest pain, nausea, vomiting, bowel or bladder changes, new/worsening shortness of breath, pain with inspiration, orthopnea, lower extremity edema,  recent ill contacts, or fever.    Past Medical History  Past Medical History:   Diagnosis Date    CAD (coronary artery disease)     CAD s/p PCI+BMS to MRCA 10/2002, PCI to mLCX 2/2003, PCI+DESx2 to pLAD 11/2007, PCI+DESx1 to dRCA 12/2007, PCI+ALVAREZ to RPDA 7/2013; on indefinite DAPT  10/27/2002    10/27/2002: AMI s/p PCI+BMS (3.5x18mm Bx velocity) to mRCA 2/5/2003: Back pain; PCI+stent to mLCX c/b distal embolization/slow flow 4/11/2003: Unstable angina; PCI+stent (Hepacoat velocity) to mLAD 11/27/2007: PCI+DESx2 to pLAD (IVUS w/ calcification of LMCA and ulcerated plaque pLAD, 80% dRCA; also had 80% dLCX, too small to stent) 12/11/2007: Staged PCI. PCI+DESx1 (3.5x13mm Cypher) to dRCA (indefinite DAPT recommended at this time) 6/27/2008: Angina. mLCX stent 70% ISR. LAD and RCA stents patent. Myocardium at risk from LCX felt to be small, medical management preferred to PCI. 11/10/2009: Angina. Findings unchanged from 6/27/2008, FFR LAD 0.90. Medical management recommended. 7/23/2013: Unstable angina; PCI+ALVAREZ to mPDA. Diagnostic findings: LMCA 40% distal. LAD: pLAD stents patent mLAD 30% ISR dLAD 50% diffuse, D2 diffuse disease. LCX 80% mid ISR. RCA diffuse <30% mid, RPLAs diffuse disease, RPDA 100% occlusion.      Cardiac Pacemaker- Medtronic, dual chamber- NOT dependant 11/28/2007    CKD (chronic kidney disease), stage IV (H)     Hyperlipidemia LDL goal <70     Hypertension     Stented coronary artery 10-    RCA    Stented coronary artery 2-5-2003    LCx    Stented coronary artery 4-    LAD    Stented coronary artery 11-    LAD    Stented coronary artery 12-    RCA    Transient complete heart block (H) 11/28/2007     Past Surgical History:   Procedure Laterality Date    CHOLECYSTECTOMY      CV CORONARY ANGIOGRAM N/A 6/17/2022    Procedure: Coronary Angiogram;  Surgeon: Constantine Macias MD;  Location:  HEART CARDIAC CATH LAB    CV CORONARY ANGIOGRAM N/A 7/17/2023    Procedure:  Coronary Angiogram;  Surgeon: Constantine Macias MD;  Location: [facility] CARDIAC CATH LAB    CV LEFT ATRIAL APPENDAGE CLOSURE N/A 8/8/2024    Procedure: Left Atrial Appendage Closure;  Surgeon: Rodrick Garcia MD;  Location: [facility] CARDIAC CATH LAB    CV PCI N/A 6/17/2022    Procedure: Percutaneous Coronary Intervention;  Surgeon: Constantine Macias MD;  Location: [facility] CARDIAC CATH LAB    CV PCI N/A 7/17/2023    Procedure: Percutaneous Coronary Intervention;  Surgeon: Constantine Macias MD;  Location: [facility] CARDIAC CATH LAB    CV PCI N/A 11/6/2023    Procedure: Percutaneous Coronary Intervention;  Surgeon: Constantine Macias MD;  Location: [facility] CARDIAC CATH LAB    CV PERICARDIOCENTESIS N/A 8/8/2024    Procedure: Pericardiocentesis;  Surgeon: Rodrick Garcia MD;  Location: [facility] CARDIAC CATH LAB    CV PERICARDIOCENTESIS N/A 8/11/2024    Procedure: Pericardial Drain Adjustment, Possible New Pericardial Drain Placement;  Surgeon: Rodrick Garcia MD;  Location: [facility] CARDIAC CATH LAB    CV RIGHT HEART CATH MEASUREMENTS RECORDED N/A 6/17/2022    Procedure: Right Heart Catheterization;  Surgeon: Constantine Macias MD;  Location: [facility] CARDIAC CATH LAB    EP PACEMAKER N/A 10/15/2019    Procedure: EP PACEMAKER;  Surgeon: Anthony Zhu MD;  Location: [facility] CARDIAC CATH LAB    ESOPHAGOSCOPY, GASTROSCOPY, DUODENOSCOPY (EGD), COMBINED N/A 7/20/2023    Procedure: Esophagoscopy, gastroscopy, duodenoscopy (EGD), combined;  Surgeon: Bekah Dash DO;  Location:  GI    ESOPHAGOSCOPY, GASTROSCOPY, DUODENOSCOPY (EGD), COMBINED N/A 5/2/2024    Procedure: Esophagoscopy, gastroscopy, duodenoscopy (EGD), combined;  Surgeon: Tavo Donaldson MD;  Location:  GI    HC PPM INSERTION NEW/REPLACEMENT W/ ATRIAL&VENTRICULAR LEAD  11-    HYSTERECTOMY      LAPAROTOMY EXPLORATORY N/A 4/13/2024    Procedure: Laparotomy exploratory, Wound Vac Placement.;  Surgeon:  "Anthony Trammell MD;  Location: [facility] OR    LAPAROTOMY EXPLORATORY N/A 4/14/2024    Procedure: Abdominal Re-Exploration and Closure;  Surgeon: Anthony Trammell MD;  Location: [facility] OR    LAPAROTOMY EXPLORATORY N/A 4/13/2024    Procedure: Exploratory Laparotomy;  Surgeon: Anthony Trammell MD;  Location: [facility] OR     acetaminophen (TYLENOL) 325 MG tablet  allopurinol (ZYLOPRIM) 100 MG tablet  amLODIPine (NORVASC) 5 MG tablet  clopidogrel (PLAVIX) 75 MG tablet  dabigatran ANTICOAGULANT (PRADAXA) 75 MG capsule  furosemide (LASIX) 40 MG tablet  isosorbide mononitrate (IMDUR) 30 MG 24 hr tablet  lidocaine (LIDODERM) 5 % patch  losartan (COZAAR) 25 MG tablet  methocarbamol (ROBAXIN) 500 MG tablet  metoprolol tartrate (LOPRESSOR) 25 MG tablet  Multiple Vitamins-Minerals (PRESERVISION AREDS 2+MULTI VIT PO)  omeprazole (PRILOSEC) 40 MG DR capsule  potassium chloride ER (K-TAB) 20 MEQ CR tablet  psyllium (METAMUCIL/KONSYL) Packet  rosuvastatin (CRESTOR) 20 MG tablet  sodium bicarbonate 650 MG tablet  timolol maleate (TIMOPTIC) 0.5 % ophthalmic solution  vitamin B-12 (CYANOCOBALAMIN) 500 MCG tablet  vitamin D3 25 mcg (1000 units) tablet      Allergies   Allergen Reactions    Shrimp Swelling    Codeine Sulfate Itching    Indomethacin      Other Reaction(s): Not available    Levofloxacin Hemihydrate      Other Reaction(s): Not available    Morphine Sulfate      Other Reaction(s): Not available    Oxycodone Other (See Comments)    Shrimp Extract Swelling    Sulfamethoxazole W/Trimethoprim      Other Reaction(s): Not available    Chlorthalidone Nausea and Vomiting     Other Reaction(s): Not available    Clarithromycin      Other Reaction(s): Not available    Clonidine      Other Reaction(s): Not available    Darvon [Propoxyphene Hcl]     Gabapentin Confusion and Fatigue     \"felt drunk\"    Other Reaction(s): Not available    Hydromorphone Hallucination and Visual Disturbance     Tolerated in April 2024 hospital " admissions    Other Reaction(s): Not available    Indomethacin     Percocet [Oxycodone-Acetaminophen] Hallucination    Pregabalin Fatigue and Itching    Simvastatin Palpitations and Cramps     Muscle weakness, leg cramping    Spironolactone      Dehydrated    Other Reaction(s): Not available    Terazosin      Other Reaction(s): Not available     Family History  Family History   Problem Relation Age of Onset    Cancer Sister 82        bladder cancer     Social History   Social History     Tobacco Use    Smoking status: Former     Current packs/day: 0.30     Average packs/day: 0.3 packs/day for 15.0 years (4.5 ttl pk-yrs)     Types: Cigarettes    Smokeless tobacco: Never    Tobacco comments:     Quit 35+ years ago   Vaping Use    Vaping status: Never Used   Substance Use Topics    Drug use: No      A medically appropriate review of systems was performed with pertinent positives and negatives noted in the HPI, and all other systems negative.    Physical Exam   BP: (!) 127/102  Pulse: 100  Temp: 97.5  F (36.4  C)  Resp: 18  SpO2: 99 %  Physical Exam  Constitutional:       General: She is not in acute distress.     Appearance: Normal appearance. She is not ill-appearing or diaphoretic.   HENT:      Head: Normocephalic and atraumatic.      Mouth/Throat:      Mouth: Mucous membranes are moist.      Pharynx: Oropharynx is clear.   Cardiovascular:      Rate and Rhythm: Rhythm irregular.      Heart sounds: No murmur heard.     No friction rub. No gallop.   Pulmonary:      Effort: Pulmonary effort is normal. No respiratory distress.      Breath sounds: Normal breath sounds.   Abdominal:      General: Abdomen is flat. Bowel sounds are normal. There is no distension.      Palpations: Abdomen is soft. There is no mass.      Tenderness: There is no abdominal tenderness. There is no guarding.   Musculoskeletal:         General: Tenderness present. No swelling. Normal range of motion.      Cervical back: Normal range of motion and  "neck supple. No deformity, rigidity, tenderness or bony tenderness. No pain with movement. Normal range of motion.      Thoracic back: Deformity, tenderness and bony tenderness present. Scoliosis present.      Lumbar back: No deformity, tenderness or bony tenderness. No scoliosis.      Right lower leg: No edema.      Left lower leg: No edema.      Comments: Nodular lesion at point of greatest tenderness approximately 5cm to the left of T6-7   Skin:     General: Skin is warm and dry.      Findings: Lesion present. No bruising, erythema or wound.      Comments: Diffuse skin-colored plaques with rough texture over back.   Neurological:      General: No focal deficit present.      Mental Status: She is alert. Mental status is at baseline.   Psychiatric:         Mood and Affect: Mood normal.         Behavior: Behavior normal.       ***    ED Course, Procedures, & Data      Procedures       {ED Course Selections (Optional):031493}  {ED Sepsis CMS Documentation (Optional):755141::\" \"}       Results for orders placed or performed during the hospital encounter of 04/30/25   EKG 12 lead     Status: None (Preliminary result)   Result Value Ref Range    Systolic Blood Pressure  mmHg    Diastolic Blood Pressure  mmHg    Ventricular Rate 97 BPM    Atrial Rate  BPM    SD Interval  ms    QRS Duration 86 ms     ms    QTc 474 ms    P Axis  degrees    R AXIS 33 degrees    T Axis 194 degrees    Interpretation ECG       Atrial fibrillation with occasional ventricular-paced complexes  ST & T wave abnormality, consider inferolateral ischemia  Abnormal ECG  When compared with ECG of 08-Apr-2025 08:00,  Electronic ventricular pacemaker has replaced Atrial fibrillation     Results for orders placed or performed during the hospital encounter of 04/30/25   EKG 12-lead, tracing only     Status: None (Preliminary result)   Result Value Ref Range    Systolic Blood Pressure  mmHg    Diastolic Blood Pressure  mmHg    Ventricular Rate 98 BPM "    Atrial Rate  BPM    ID Interval  ms    QRS Duration 88 ms     ms    QTc 495 ms    P Axis  degrees    R AXIS 41 degrees    T Axis 201 degrees    Interpretation ECG       Atrial fibrillation  ST & T wave abnormality, consider inferolateral ischemia  QTcB >= 480 msec  Abnormal ECG       Medications - No data to display  Labs Ordered and Resulted from Time of ED Arrival to Time of ED Departure - No data to display  No orders to display          {Critical Care Performed?:644091}    Assessment & Plan    Tessa is an 89 year old female with an extensive cardiac history that presents with a complaint of thoracic back pain that started 3 or 4 days ago. Differential diagnosis includes ACS, thoracic aortic dissection, PE, musculoskeletal injury, and bony lesion/metastasis. Initial workup includes EKG, CBC, CMP, Magnesium, Troponin, D-dimer, INR, CT chest w/o contrast, and CT thoracic spine.    EKG obtained in ED on arrival consistent with previous EKG without new ST segment changes. Initial vitals include hypertension and borderline tachycardia.    D-dimer of 1.91 concerning for VTE and CT chest showed mild dilation of the pulmonary artery. No bony abnormalities seen on CT. Due to depressed renal function, V/Q scan will be utilized instead of CT angiogram for further evaluation of possible PE. Echocardiogram ordered to further evaluate cardiac etiology for the pleural effusions and possible pulmonary edema seen on CT and for further cardiac evaluation given the elevated high-sensitivity Troponin. INR is within therapeutic limits, and anemia seen on CBC can be consistent with advanced age and/or anemia of chronic disease with pt's CKD.    Echocardiogram did not show any acute changes, but did not visualize the pulmonary artery. V/Q perfusion scan resulted as low likelihood of embolus. Cardiology PA inquired about returning pt to echo lab for her cardioversion. Acute and life-threatening etiologies of her pain have been  ruled out and the pt can go to have the previously scheduled cardioversion.    I have reviewed the nursing notes. I have reviewed the findings, diagnosis, plan and need for follow up with the patient.    New Prescriptions    No medications on file       Final diagnoses:   None       Shahnaz Spain, MS-3    This data collected with the medical student working in the Emergency Department.  I repeated the history and physical exam with the patient.     Alyssa Mejia Md***  [facility] EMERGENCY DEPARTMENT  4/30/2025   History: left back pain   ICD-10:    Comparison: None available     Technique: Using multidetector thin collimation helical acquisition  technique, axial, sagittal and coronal 3 mm thickness CT  reconstructions were obtained through the thoracic spine without  intravenous contrast.  Images were viewed in bone and soft tissue  windows.    Findings:  Thoracic spine alignment is within normal limits. There is no evidence  of fracture or significant prevertebral soft tissue swelling. There is  mild multilevel disc height narrowing from T6 through T9. The spinal  canal and neural foramina are patent. The visualized prevertebral and  paravertebral tissues are unremarkable.     Small right greater than left pleural effusions. Small amount of  ascites is visualized in the upper abdomen about the liver and spleen.  Please refer to the chest CT performed on the same date for the  findings within the chest, mediastinum and upper abdomen.      Impression    Impression:   1. Mild multilevel thoracic spondylosis with no high-grade spinal  canal stenosis or neural foraminal narrowing.  2. Small right greater than left pleural effusion effusions and small  amount of ascites in the upper abdomen about the liver and spleen.  These refer to the chest CT performed on the same date. The findings  within the chest, mediastinum, and upper abdomen.    ANANDA GAN MD         SYSTEM ID:  [id number]   NM Lung Scan Ventilation and Perfusion     Status: None    Narrative    Examination:NM LUNG SCAN VENTILATION AND PERFUSION, 4/30/2025 2:13 PM     Indication:  pain elevated dimer     Additional Information: none    TECHNIQUE:    The patient received 2 mCi of Tc-99m DTPA aerosol for ventilation and  7 mCi of Tc-99m MAA for perfusion. Multiple images were obtained of  the lungs in Anterior, posterior, GRAMAJO, RPO, LPO, and  French projections.    COMPARISON: CT chest 4/30/2025. Multiple priors    FINDINGS:    The perfusion study demonstrates: No  mismatched perfusion defects    The ventilation study demonstrates: Clumping of radiotracer in central  airway.      Impression    Impression:    No mismatched perfusion defect to suggest pulmonary embolus.    I have personally reviewed the examination and initial interpretation  and I agree with the findings.    KAMI CAMPUZANO MD         SYSTEM ID:  [id number]   INR     Status: Abnormal   Result Value Ref Range    INR 1.43 (H) 0.85 - 1.15    PT 17.7 (H) 11.8 - 14.8 Seconds   D dimer quantitative     Status: Abnormal   Result Value Ref Range    D-Dimer Quantitative 1.91 (H) 0.00 - 0.50 ug/mL FEU    Narrative    This D-dimer assay is intended for use in conjunction with a clinical pretest probability assessment model to exclude pulmonary embolism (PE) and deep venous thrombosis (DVT) in outpatients suspected of PE or DVT. The cut-off value is 0.50 ug/mL FEU.    For patients 50 years of age or older, the application of age-adjusted cut-off values for D-Dimer may increase the specificity without significant effect on sensitivity. The literature suggested calculation age adjusted cut-off in ug/L = age in years x 10 ug/L. The results in this laboratory are reported as ug/mL rather than ug/L. The calculation for age adjusted cut off in ug/mL= age in years x 0.01 ug/mL. For example, the cut off for a 76 year old male is 76 x 0.01 ug/mL = 0.76 ug/mL (760 ug/L).    M Tang et al. Age adjusted D-dimer cut-off levels to rule out pulmonary embolism: The ADJUST-PE Study. HANK 2014;311:9245-8742.; HJ Megan et al. Diagnostic accuracy of conventional or age adjusted D-dimer cutoff values in older patients with suspected venous thromboembolism. Systemic review and meta-analysis. BMJ 2013:346:f2492.   Comprehensive metabolic panel     Status: Abnormal   Result Value Ref Range    Sodium 144 135 - 145 mmol/L    Potassium 3.5 3.4 - 5.3 mmol/L    Carbon Dioxide (CO2) 20 (L) 22 - 29 mmol/L    Anion Gap 15 7 - 15 mmol/L    Urea Nitrogen  28.0 (H) 8.0 - 23.0 mg/dL    Creatinine 2.04 (H) 0.51 - 0.95 mg/dL    GFR Estimate 23 (L) >60 mL/min/1.73m2    Calcium 8.7 (L) 8.8 - 10.4 mg/dL    Chloride 109 (H) 98 - 107 mmol/L    Glucose 87 70 - 99 mg/dL    Alkaline Phosphatase 145 40 - 150 U/L    AST 36 0 - 45 U/L    ALT 28 0 - 50 U/L    Protein Total 6.3 (L) 6.4 - 8.3 g/dL    Albumin 3.7 3.5 - 5.2 g/dL    Bilirubin Total 0.8 <=1.2 mg/dL   Troponin T, High Sensitivity     Status: Abnormal   Result Value Ref Range    Troponin T, High Sensitivity 33 (H) <=14 ng/L   Magnesium     Status: Normal   Result Value Ref Range    Magnesium 2.0 1.7 - 2.3 mg/dL   CBC with platelets and differential     Status: Abnormal   Result Value Ref Range    WBC Count 8.7 4.0 - 11.0 10e3/uL    RBC Count 3.47 (L) 3.80 - 5.20 10e6/uL    Hemoglobin 10.3 (L) 11.7 - 15.7 g/dL    Hematocrit 35.0 35.0 - 47.0 %     (H) 78 - 100 fL    MCH 29.7 26.5 - 33.0 pg    MCHC 29.4 (L) 31.5 - 36.5 g/dL    RDW 18.1 (H) 10.0 - 15.0 %    Platelet Count 97 (L) 150 - 450 10e3/uL    % Neutrophils 56 %    % Lymphocytes 16 %    % Monocytes 27 %    % Eosinophils 0 %    % Basophils 1 %    % Immature Granulocytes 1 %    NRBCs per 100 WBC 0 <1 /100    Absolute Neutrophils 4.9 1.6 - 8.3 10e3/uL    Absolute Lymphocytes 1.4 0.8 - 5.3 10e3/uL    Absolute Monocytes 2.3 (H) 0.0 - 1.3 10e3/uL    Absolute Eosinophils 0.0 0.0 - 0.7 10e3/uL    Absolute Basophils 0.0 0.0 - 0.2 10e3/uL    Absolute Immature Granulocytes 0.1 <=0.4 10e3/uL    Absolute NRBCs 0.0 10e3/uL   iStat Troponin, POCT     Status: Normal   Result Value Ref Range    TROPPC POCT 0.03 <=0.12 ug/L   RBC and Platelet Morphology     Status: Abnormal   Result Value Ref Range    RBC Morphology Confirmed RBC Indices     Platelet Assessment  Automated Count Confirmed. Platelet morphology is normal.     Automated Count Confirmed. Platelet morphology is normal.    [place] Cells Slight (A) None Seen    Polychromasia Slight (A) None Seen   Troponin T, High Sensitivity      Status: Abnormal   Result Value Ref Range    Troponin T, High Sensitivity 31 (H) <=14 ng/L   Glucose by meter     Status: Normal   Result Value Ref Range    GLUCOSE BY METER POCT 87 70 - 99 mg/dL   Iron & Iron Binding Capacity     Status: Abnormal   Result Value Ref Range    Iron 30 (L) 37 - 145 ug/dL    Iron Binding Capacity 198 (L) 240 - 430 ug/dL    Iron Sat Index 15 15 - 46 %   Ferritin     Status: Abnormal   Result Value Ref Range    Ferritin 393 (H) 11 - 328 ng/mL   EKG 12-lead, tracing only     Status: None   Result Value Ref Range    Systolic Blood Pressure  mmHg    Diastolic Blood Pressure  mmHg    Ventricular Rate 98 BPM    Atrial Rate  BPM    MT Interval  ms    QRS Duration 88 ms     ms    QTc 495 ms    P Axis  degrees    R AXIS 41 degrees    T Axis 201 degrees    Interpretation ECG       Atrial fibrillation  ST & T wave abnormality, consider inferolateral ischemia  QTcB >= 480 msec  Abnormal ECG  Unconfirmed report - interpretation of this ECG is computer generated - see medical record for final interpretation  Confirmed by - EMERGENCY ROOM, PHYSICIAN ([id number]),  RENEE DOMINGUEZ ([id number]) on 2025 9:09:22 AM     Echocardiogram Complete     Status: None   Result Value Ref Range    LVEF  55-60%     Narrative    [id number]  [id number]  [id number]  [id number]^JOJO^KARSON     [facility],[place]  Echocardiography Laboratory  [address] [place] [id number]     Name: LUCIO VALERIO  MRN: [id number]  : 1935  Study Date: 2025 11:02 AM  Age: 89 yrs  Gender: Female  Patient Location: [facility]  Reason For Study: Chest Pain  Ordering Physician: KARSON URIBE  Performed By: Jean Claude Soriano     BSA: 1.8 m2  Height: 66 in  Weight: 150 lb  BP: 138/95 mmHg  ______________________________________________________________________________  Procedure  Echocardiogram with two-dimensional, color and spectral  Doppler.  ______________________________________________________________________________  Interpretation Summary  Global and regional left ventricular function is normal with an EF of 55-60%.  Right ventricular function, chamber size, wall motion, and thickness are  normal.  Moderate mitral insufficiency is present.  Severe tricuspid insufficiency is present.  IVC diameter >2.1 cm collapsing <50% with sniff suggests a high RA pressure  estimated at 15 mmHg or greater.  No pericardial effusion is present.  ______________________________________________________________________________  Left Ventricle  Global and regional left ventricular function is normal with an EF of 55-60%.  Left ventricular size is normal. Mild concentric wall thickening consistent  with left ventricular hypertrophy is present. Diastolic function not assessed  due to atrial fibrillation. No regional wall motion abnormalities are seen.     Right Ventricle  Right ventricular function, chamber size, wall motion, and thickness are  normal. A pacemaker lead is noted in the right ventricle.     Atria  Mild biatrial enlargement is present.     Mitral Valve  Moderate mitral insufficiency is present.     Aortic Valve  Aortic valve sclerosis is present.     Tricuspid Valve  Severe tricuspid insufficiency is present. The right ventricular systolic  pressure is approximated at 24.5 mmHg plus the right atrial pressure.     Pulmonic Valve  The pulmonic valve is normal. Trace pulmonic insufficiency is present.     Vessels  The thoracic aorta is normal. The pulmonary artery and bifurcation cannot be  assessed. IVC diameter >2.1 cm collapsing <50% with sniff suggests a high RA  pressure estimated at 15 mmHg or greater.     Pericardium  No pericardial effusion is present.     ______________________________________________________________________________  MMode/2D Measurements & Calculations  IVSd: 1.2 cm  LVIDd: 3.7 cm  LVIDs: 2.6 cm  LVPWd: 1.3 cm     FS: 31.5  %  LV mass(C)d: 158.1 grams  LV mass(C)dI: 89.3 grams/m2  Ao root diam: 3.1 cm  asc Aorta Diam: 3.0 cm  LVOT diam: 1.9 cm  LVOT area: 2.8 cm2  Ao root diam index Ht(cm/m): 1.9  Ao root diam index BSA (cm/m2): 1.8  Asc Ao diam index BSA (cm/m2): 1.7  Asc Ao diam index Ht(cm/m): 1.8  LA Volume (BP): 75.2 ml     LA Volume Index (BP): 42.5 ml/m2  RWT: 0.68     Doppler Measurements & Calculations  MV E max seb: 85.7 cm/sec  Ao V2 max: 99.1 cm/sec  Ao max PG: 3.9 mmHg  Ao V2 mean: 72.8 cm/sec  Ao mean P.3 mmHg  Ao V2 VTI: 20.0 cm  WILLIAM(I,D): 1.5 cm2  WILLIAM(V,D): 1.7 cm2  LV V1 max P.4 mmHg  LV V1 max: 58.9 cm/sec  LV V1 VTI: 10.9 cm  MR PISA: 2.3 cm2  MR ERO: 0.16 cm2  MR volume: 31.8 ml  SV(LVOT): 30.7 ml  SI(LVOT): 17.3 ml/m2  TR max seb: 247.5 cm/sec  TR max P.5 mmHg  AV Seb Ratio (DI): 0.59  WILLIAM Index (cm2/m2): 0.87  E/E' avg: 10.4  Lateral E/e': 7.0  Medial E/e': 13.8  RV S Seb: 7.6 cm/sec     ______________________________________________________________________________  Report approved by: ZACH Singh MD on 2025 11:55 AM         CBC with platelets differential     Status: Abnormal    Narrative    The following orders were created for panel order CBC with platelets differential.  Procedure                               Abnormality         Status                     ---------                               -----------         ------                     CBC with platelets and ...[[id number]]  Abnormal            Final result               RBC and Platelet Morpho...[[id number]]  Abnormal            Final result                 Please view results for these tests on the individual orders.   Results for orders placed or performed during the hospital encounter of 25   EKG 12-lead, tracing only     Status: None   Result Value Ref Range    Systolic Blood Pressure  mmHg    Diastolic Blood Pressure  mmHg    Ventricular Rate 66 BPM    Atrial Rate 80 BPM    NH Interval 160 ms    QRS Duration 140 ms    QT  504 ms    QTc 528 ms    P Axis 113 degrees    R AXIS -87 degrees    T Axis 93 degrees    Interpretation ECG       AV dual-paced rhythm with occasional ventricular-paced complexes and with occasional Premature ventricular complexes  Abnormal ECG  When compared with ECG of 2025 08:18,  Electronic ventricular pacemaker has replaced Atrial fibrillation  Vent. rate has decreased by  32 bpm  Confirmed by MD MATHEUS, MACK ([id number]) on 2025 4:42:48 PM     Transesophageal Echocardiogram     Status: None   Result Value Ref Range    LVEF  55-60%     [facility]    [id number]  [facility]  [id number]  [id number]^[email or web address]^LEANNE     [facility]  Echocardiography Laboratory  [address] [place] [address]     Name: LUCIO VALERIO  MRN: [id number]  : 1935  Study Date: 2025 02:59 PM  Age: 89 yrs  Gender: Female  Patient Location: [facility]  Reason For Study: Paroxysmal atrial fibrillation (H)  History: Atrial Fibrillation  Ordering Physician: LEANNE DENG  Referring Physician: LEANNE DENG  Performed By: Tyrell Santoyo MD     BSA: 1.8 m2  Height: 66 in  Weight: 150 lb  HR: 82  BP: 157/87 mmHg  ______________________________________________________________________________  Interpretation Summary  Status post percutaneous left atrial appendage occlusion with 31 mm WATCHMAN  FLX device 2024. The device is well-seated and there is no caridad-device leak  and no flow into the left atrial appendage. There is no thrombus on the left  atrial side of the device. The bodies of the left and right atria and the  right atrial appendage are likewise free of thrombus.  ______________________________________________________________________________  Procedure  Transesophageal Echocardiogram with two-dimensional, color and spectral  Doppler. 3D image acquisition, reconstruction, and real-time interpretation  was performed. Procedure location Echo Lab. The procedure was performed in  the  Echo Lab. Informed consent for Transesophegeal echo obtained. MICHELL Probe #67  was used during the procedure. Patient was sedated using Fentanyl 25 mcg.  Patient was sedated using Versed 1 mg. The heart rate, respiratory rate,  oxygen saturations, blood pressure, and response to care were monitored  throughout the procedure with the assistance of the nurse. I determined this  patient to be an appropriate candidate for the planned sedation and procedure  and have reassessed the patient immediately prior to sedation and procedure.  Total sedation time: 19 minutes of continuous bedside 1:1 monitoring. The  Transducer was inserted without difficulty . The patient tolerated the  procedure well. Complications None. The patient's rhythm is atrial  fibrillation. Good quality two-dimensional was performed and interpreted. Good  quality color and spectral Doppler were performed and interpreted.     Left Ventricle  Left ventricular size is normal. Global and regional left ventricular function  is normal with an EF of 55-60%.     Right Ventricle  The right ventricle is normal size. Global right ventricular function is  normal. A pacemaker lead is noted in the right ventricle.     Atria  Severe biatrial enlargement is present. 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 Status post percutaneous left atrial  appendage occlusion with 31 mm WATCHMAN FLX device 8/2024. The device is well-  seated and there is no caridad-device leak and no flow into the left atrial  appendage. There is no thrombus on the left atrial side of the device. A  pacemaker lead is noted in the right atrium.     Mitral Valve  Mild to moderate mitral insufficiency is present. The mechanism of MR appears  to be left atrial dilatation (atrial functional MR.).     Aortic Valve  The aortic valve is tricuspid. Aortic valve sclerosis is present.     Tricuspid Valve  Moderate tricuspid insufficiency is present. The mechanism of TR  appears to be  right atrial enlargement (atrial functional TR.). The peak velocity of the  tricuspid regurgitant jet is not obtainable. Pulmonary artery systolic  pressure cannot be assessed.     Pulmonic Valve  The pulmonic valve is normal. Trace pulmonic insufficiency is present.     Vessels  The aorta root is normal. Complex atheroma of the aorta are present in the  acsending aorta, arch, and descending thoracic aorta.     Pericardium  No pericardial effusion is present.     Compared to Previous Study  This study was compared with the study from 10/1/24 . There has been no  change.     ______________________________________________________________________________  Report approved by: Tyrell Santoyo MD on 04/30/2025 04:52 PM     ______________________________________________________________________________        Medications   technetium pertechnetate with albumin (Tc99m MAA) radioisotope injection 2-7.2 millicurie (7 millicuries Intravenous $Given 4/30/25 1355)   technetium pentetate Tc99m (DTPA) inhaled radioisotope 65 millicurie (2 millicuries Inhalation $Given 4/30/25 1332)     Labs Ordered and Resulted from Time of ED Arrival to Time of ED Departure   INR - Abnormal       Result Value    INR 1.43 (*)     PT 17.7 (*)    D DIMER QUANTITATIVE - Abnormal    D-Dimer Quantitative 1.91 (*)    COMPREHENSIVE METABOLIC PANEL - Abnormal    Sodium 144      Potassium 3.5      Carbon Dioxide (CO2) 20 (*)     Anion Gap 15      Urea Nitrogen 28.0 (*)     Creatinine 2.04 (*)     GFR Estimate 23 (*)     Calcium 8.7 (*)     Chloride 109 (*)     Glucose 87      Alkaline Phosphatase 145      AST 36      ALT 28      Protein Total 6.3 (*)     Albumin 3.7      Bilirubin Total 0.8     TROPONIN T, HIGH SENSITIVITY - Abnormal    Troponin T, High Sensitivity 33 (*)    CBC WITH PLATELETS AND DIFFERENTIAL - Abnormal    WBC Count 8.7      RBC Count 3.47 (*)     Hemoglobin 10.3 (*)     Hematocrit 35.0       (*)     MCH 29.7       MCHC 29.4 (*)     RDW 18.1 (*)     Platelet Count 97 (*)     % Neutrophils 56      % Lymphocytes 16      % Monocytes 27      % Eosinophils 0      % Basophils 1      % Immature Granulocytes 1      NRBCs per 100 WBC 0      Absolute Neutrophils 4.9      Absolute Lymphocytes 1.4      Absolute Monocytes 2.3 (*)     Absolute Eosinophils 0.0      Absolute Basophils 0.0      Absolute Immature Granulocytes 0.1      Absolute NRBCs 0.0     RBC AND PLATELET MORPHOLOGY - Abnormal    RBC Morphology Confirmed RBC Indices      Platelet Assessment        Value: Automated Count Confirmed. Platelet morphology is normal.    McClure Cells Slight (*)     Polychromasia Slight (*)    TROPONIN T, HIGH SENSITIVITY - Abnormal    Troponin T, High Sensitivity 31 (*)    MAGNESIUM - Normal    Magnesium 2.0     ISTAT TROPONIN POCT - Normal    TROPPC POCT 0.03     GLUCOSE BY METER - Normal    GLUCOSE BY METER POCT 87       NM Lung Scan Ventilation and Perfusion   Final Result   Impression:      No mismatched perfusion defect to suggest pulmonary embolus.      I have personally reviewed the examination and initial interpretation   and I agree with the findings.      KAMI CAMPUZANO MD            SYSTEM ID:  [id number]      Echocardiogram Complete   Final Result      CT Thoracic Spine Reconstructed   Final Result   Impression:    1. Mild multilevel thoracic spondylosis with no high-grade spinal   canal stenosis or neural foraminal narrowing.   2. Small right greater than left pleural effusion effusions and small   amount of ascites in the upper abdomen about the liver and spleen.   These refer to the chest CT performed on the same date. The findings   within the chest, mediastinum, and upper abdomen.      ANANDA GAN MD            SYSTEM ID:  [id number]      CT Chest w/o Contrast   Final Result   IMPRESSION:    1. Cardiomegaly with small right and trace left pleural effusions and   findings suggestive of minimal pulmonary edema.   2. Irregular/nodular  opacities in the medial right lower lobe may be   infectious/inflammatory or atelectatic/fibrotic.   3. Small volume ascites in the upper abdomen.      KATHERIN COSTELLO DO            SYSTEM ID:  [id number]             Critical care was not performed.     Medical Decision Making  The patient's presentation was of high complexity (a chronic illness severe exacerbation, progression, or side effect of treatment).    The patient's evaluation involved:  ordering and/or review of 3+ test(s) in this encounter (see separate area of note for details)    The patient's management necessitated moderate risk (prescription drug management including medications given in the ED).    Assessment & Plan    Tessa is an 89 year old female with an extensive cardiac history that presents with a complaint of thoracic back pain that started 3 or 4 days ago. Differential diagnosis includes ACS, thoracic aortic dissection, PE, musculoskeletal injury, and bony lesion/metastasis. Initial workup includes EKG, CBC, CMP, Magnesium, Troponin, D-dimer, INR, CT chest w/o contrast, and CT thoracic spine.    EKG obtained in ED on arrival consistent with previous EKG without new ST segment changes. Initial vitals include hypertension and borderline tachycardia.    D-dimer of 1.91 concerning for VTE and CT chest showed mild dilation of the pulmonary artery. No bony abnormalities seen on CT. Due to depressed renal function, V/Q scan will be utilized instead of CT angiogram for further evaluation of possible PE. Echocardiogram ordered to further evaluate cardiac etiology for the pleural effusions and possible pulmonary edema seen on CT and for further cardiac evaluation given the elevated high-sensitivity Troponin. INR is within therapeutic limits, and anemia seen on CBC can be consistent with advanced age and/or anemia of chronic disease with pt's CKD.    Echocardiogram did not show any acute changes, but did not visualize the pulmonary artery. V/Q perfusion  scan resulted as low likelihood of embolus. Cardiology PA inquired about returning pt to echo lab for her cardioversion. Acute and life-threatening etiologies of her pain have been ruled out and the pt can go to have the previously scheduled cardioversion.    I have reviewed the nursing notes. I have reviewed the findings, diagnosis, plan and need for follow up with the patient.    Discharge Medication List as of 4/30/2025  6:19 PM          Final diagnoses:   Upper back pain on left side       Shahnaz Spain, MS-3    This data collected with the medical student working in the Emergency Department.  I repeated the history and physical exam with the patient.     --    ED Attending Physician Attestation    I Alyssa Mejia MD, MD, cared for this patient with the Medical Student. I performed, or re-performed, the physical exam and medical decision-making. I have verified the accuracy of all the medical student findings and documentation above, and have edited as necessary.    Summary of HPI, PE, ED Course   Patient is a 89 year old female evaluated in the emergency department for mid to upper back pain. Exam and ED course notable for EKG Labs ok except mildly elevated dimer-VQ scan neg for PE. After the completion of care in the emergency department, the patient was discharged.      Alyssa Mejia MD, MD  Emergency Medicine    Alyssa Mejia Md  [facility] EMERGENCY DEPARTMENT  4/30/2025     Alyssa Mejia MD  05/09/25 [id number]

## 2025-04-30 NOTE — PROCEDURES
[facility]    Procedure: Cardioversion    Date/Time: 4/30/2025 4:00 PM    Performed by: Lisa Mendoza PA-C  Authorized by: Lisa Mendoza PA-C      UNIVERSAL PROTOCOL   Site Marked: NA  Prior Images Obtained and Reviewed:  NA  Required items: Required blood products, implants, devices and special equipment available    Patient identity confirmed:  Verbally with patient and provided demographic data  Patient was reevaluated immediately before administering moderate or deep sedation or anesthesia  Confirmation Checklist:  Patient's identity using two indicators, relevant allergies, procedure was appropriate and matched the consent or emergent situation and correct equipment/implants were available  Time out: Immediately prior to the procedure a time out was called    Universal Protocol: the [facility] Universal Protocol was followed       ANESTHESIA    Anesthesia was administered and monitored by anesthesiology.  See anesthesia documentation for details.    PROCEDURE DETAILS  Pre-procedure rhythm: atrial fibrillation  Patient position: patient was placed in a supine position  Chest area: chest area exposed  Electrodes: pads  Electrodes placed: anterior-posterior  Number of attempts: 1    Details of Attempts:  Echo staff performed MICHELL prior and reported no intracardiac thrombus. 150J biphasic synchronized shock delivered with successful conversion.     Post-procedure rhythm: paced-atrial (occasional ectopy)  Complications: no complications      PROCEDURE    Patient Tolerance:  Patient tolerated the procedure well with no immediate complications    Anticoagulation: Pradaxa 75 mg BID. MICHELL performed prior without evidence of intracardiac thrombus.     Lisa Mendoza PA-C  [facility]  Electrophysiology Consult Service  Pager #[phone]

## 2025-05-01 ENCOUNTER — PATIENT OUTREACH (OUTPATIENT)
Dept: CARE COORDINATION | Facility: CLINIC | Age: 89
End: 2025-05-01
Payer: COMMERCIAL

## 2025-05-01 DIAGNOSIS — D63.1 ANEMIA OF CHRONIC RENAL FAILURE, STAGE 4 (SEVERE) (H): Primary | ICD-10-CM

## 2025-05-01 DIAGNOSIS — N18.4 ANEMIA OF CHRONIC RENAL FAILURE, STAGE 4 (SEVERE) (H): Primary | ICD-10-CM

## 2025-05-01 LAB
FERRITIN SERPL-MCNC: 393 NG/ML (ref 11–328)
IRON BINDING CAPACITY (ROCHE): 198 UG/DL (ref 240–430)
IRON SATN MFR SERPL: 15 % (ref 15–46)
IRON SERPL-MCNC: 30 UG/DL (ref 37–145)

## 2025-05-01 NOTE — PROGRESS NOTES
Anemia Management Note - Follow Up      SUBJECTIVE/OBJECTIVE:    Referred by Dr. Marzena Martin on 2024  Primary Diagnosis: Anemia in Chronic Kidney Disease (N18.4, D63.1)     Secondary Diagnosis: Chronic Kidney Disease, Stage 4 (N18.4)   Hgb goal range: 9-10     Epo/Darbo: TBD; treat with IV Iron per clinic note on 24.   Iron regimen: Treat with IV Iron; Oral Iron stopped  *Note form 24; Recommended to stop oral iron and start IV iron due to potential for GI bleeding and difficulty in knowing if black stools are from iron or bleeding.       Labs : 2025  RX/TX plans : TBD     Recent BREANNA use, transfusion, IV iron: Venofer and PRBCs  Hx of CAD, NTEMI (), HTN, Pacemaker, AFib, Anticoagulated.     Contact: Consent to Communicate scanned on 2019.         Latest Ref Rng & Units 2025 2025 2025 4/3/2025 2025 2025 2025   Anemia   Hemoglobin 11.7 - 15.7 g/dL 10.5  11.7  11.8  11.1  9.8  10.1  10.3    TSAT 15 - 46 % 14          Ferritin 11 - 328 ng/mL 459            BP Readings from Last 3 Encounters:   25 (!) 151/74   25 (!) 151/80   25 118/88     Wt Readings from Last 2 Encounters:   25 68 kg (150 lb)   25 63.5 kg (140 lb)           ASSESSMENT:    Hgb:at goal - continue to monitor  TSat: pending Ferritin: Pending        PLAN:  Due to have Iron studies drawn.  Was able to add-on to prior lab draw.  Results are pending.     Orders needed to be renewed (for next follow-up date) in [facility]: None    Iron labs due:  Pending    Plan discussed with:  No call, chart review       NEXT FOLLOW-UP DATE:  25    Barbara Dimas RN   Anemia Services  [facility]  [email or web address]   Office : [phone]  Fax: [phone]

## 2025-05-07 ENCOUNTER — HOSPITAL ENCOUNTER (OUTPATIENT)
Facility: CLINIC | Age: 89
Setting detail: OBSERVATION
End: 2025-05-07
Attending: STUDENT IN AN ORGANIZED HEALTH CARE EDUCATION/TRAINING PROGRAM | Admitting: HOSPITALIST
Payer: COMMERCIAL

## 2025-05-07 ENCOUNTER — APPOINTMENT (OUTPATIENT)
Dept: CT IMAGING | Facility: CLINIC | Age: 89
End: 2025-05-07
Attending: STUDENT IN AN ORGANIZED HEALTH CARE EDUCATION/TRAINING PROGRAM
Payer: COMMERCIAL

## 2025-05-07 ENCOUNTER — APPOINTMENT (OUTPATIENT)
Dept: GENERAL RADIOLOGY | Facility: CLINIC | Age: 89
End: 2025-05-07
Attending: STUDENT IN AN ORGANIZED HEALTH CARE EDUCATION/TRAINING PROGRAM
Payer: COMMERCIAL

## 2025-05-07 ENCOUNTER — OFFICE VISIT (OUTPATIENT)
Dept: FAMILY MEDICINE | Facility: CLINIC | Age: 89
End: 2025-05-07
Payer: COMMERCIAL

## 2025-05-07 VITALS
OXYGEN SATURATION: 99 % | TEMPERATURE: 98.1 F | SYSTOLIC BLOOD PRESSURE: 179 MMHG | RESPIRATION RATE: 16 BRPM | HEART RATE: 72 BPM | DIASTOLIC BLOOD PRESSURE: 80 MMHG

## 2025-05-07 DIAGNOSIS — M25.552 HIP PAIN, LEFT: ICD-10-CM

## 2025-05-07 DIAGNOSIS — N18.4 CHRONIC KIDNEY DISEASE, STAGE IV (SEVERE) (H): Primary | ICD-10-CM

## 2025-05-07 DIAGNOSIS — M25.552 ACUTE HIP PAIN, LEFT: Primary | ICD-10-CM

## 2025-05-07 DIAGNOSIS — T80.818A: ICD-10-CM

## 2025-05-07 DIAGNOSIS — Z95.0 PACEMAKER: ICD-10-CM

## 2025-05-07 DIAGNOSIS — R10.32 ABDOMINAL PAIN, LEFT LOWER QUADRANT: ICD-10-CM

## 2025-05-07 DIAGNOSIS — R26.2 AMBULATORY DYSFUNCTION: ICD-10-CM

## 2025-05-07 LAB
ALBUMIN SERPL BCG-MCNC: 4.2 G/DL (ref 3.5–5.2)
ALBUMIN UR-MCNC: 50 MG/DL
ALP SERPL-CCNC: 148 U/L (ref 40–150)
ALT SERPL W P-5'-P-CCNC: 27 U/L (ref 0–50)
ANION GAP SERPL CALCULATED.3IONS-SCNC: 14 MMOL/L (ref 7–15)
APPEARANCE UR: CLEAR
AST SERPL W P-5'-P-CCNC: 43 U/L (ref 0–45)
BASOPHILS # BLD MANUAL: 0 10E3/UL (ref 0–0.2)
BASOPHILS NFR BLD MANUAL: 0 %
BILIRUB SERPL-MCNC: 1.4 MG/DL
BILIRUB UR QL STRIP: NEGATIVE
BUN SERPL-MCNC: 16 MG/DL (ref 8–23)
CALCIUM SERPL-MCNC: 9.3 MG/DL (ref 8.8–10.4)
CHLORIDE SERPL-SCNC: 106 MMOL/L (ref 98–107)
COLOR UR AUTO: ABNORMAL
CREAT SERPL-MCNC: 1.54 MG/DL (ref 0.51–0.95)
CRP SERPL-MCNC: 13.4 MG/L
EGFRCR SERPLBLD CKD-EPI 2021: 32 ML/MIN/1.73M2
EOSINOPHIL # BLD MANUAL: 0 10E3/UL (ref 0–0.7)
EOSINOPHIL NFR BLD MANUAL: 0 %
ERYTHROCYTE [DISTWIDTH] IN BLOOD BY AUTOMATED COUNT: 17.4 % (ref 10–15)
GLUCOSE SERPL-MCNC: 82 MG/DL (ref 70–99)
GLUCOSE UR STRIP-MCNC: NEGATIVE MG/DL
HCO3 SERPL-SCNC: 25 MMOL/L (ref 22–29)
HCT VFR BLD AUTO: 38.4 % (ref 35–47)
HGB BLD-MCNC: 11.9 G/DL (ref 11.7–15.7)
HGB UR QL STRIP: NEGATIVE
INR PPP: 1.44 (ref 0.85–1.15)
KETONES UR STRIP-MCNC: NEGATIVE MG/DL
LACTATE SERPL-SCNC: 0.9 MMOL/L (ref 0.7–2)
LEUKOCYTE ESTERASE UR QL STRIP: NEGATIVE
LYMPHOCYTES # BLD MANUAL: 2.3 10E3/UL (ref 0.8–5.3)
LYMPHOCYTES NFR BLD MANUAL: 20 %
MCH RBC QN AUTO: 30.1 PG (ref 26.5–33)
MCHC RBC AUTO-ENTMCNC: 31 G/DL (ref 31.5–36.5)
MCV RBC AUTO: 97 FL (ref 78–100)
MONOCYTES # BLD MANUAL: 2.7 10E3/UL (ref 0–1.3)
MONOCYTES NFR BLD MANUAL: 24 %
MUCOUS THREADS #/AREA URNS LPF: PRESENT /LPF
NEUTROPHILS # BLD MANUAL: 6.4 10E3/UL (ref 1.6–8.3)
NEUTROPHILS NFR BLD MANUAL: 57 %
NITRATE UR QL: NEGATIVE
PH UR STRIP: 7 [PH] (ref 5–7)
PLAT MORPH BLD: NORMAL
PLATELET # BLD AUTO: 140 10E3/UL (ref 150–450)
POTASSIUM SERPL-SCNC: 3.5 MMOL/L (ref 3.4–5.3)
PROT SERPL-MCNC: 7.6 G/DL (ref 6.4–8.3)
PROTHROMBIN TIME: 17.8 SECONDS (ref 11.8–14.8)
RBC # BLD AUTO: 3.96 10E6/UL (ref 3.8–5.2)
RBC MORPH BLD: NORMAL
RBC URINE: 3 /HPF
SODIUM SERPL-SCNC: 145 MMOL/L (ref 135–145)
SP GR UR STRIP: 1.01 (ref 1–1.03)
SQUAMOUS EPITHELIAL: <1 /HPF
UROBILINOGEN UR STRIP-MCNC: NORMAL MG/DL
WBC # BLD AUTO: 11.4 10E3/UL (ref 4–11)
WBC URINE: 1 /HPF

## 2025-05-07 PROCEDURE — 96375 TX/PRO/DX INJ NEW DRUG ADDON: CPT

## 2025-05-07 PROCEDURE — 36415 COLL VENOUS BLD VENIPUNCTURE: CPT | Performed by: STUDENT IN AN ORGANIZED HEALTH CARE EDUCATION/TRAINING PROGRAM

## 2025-05-07 PROCEDURE — 99285 EMERGENCY DEPT VISIT HI MDM: CPT | Performed by: STUDENT IN AN ORGANIZED HEALTH CARE EDUCATION/TRAINING PROGRAM

## 2025-05-07 PROCEDURE — 73501 X-RAY EXAM HIP UNI 1 VIEW: CPT | Mod: 26 | Performed by: RADIOLOGY

## 2025-05-07 PROCEDURE — 85610 PROTHROMBIN TIME: CPT | Performed by: STUDENT IN AN ORGANIZED HEALTH CARE EDUCATION/TRAINING PROGRAM

## 2025-05-07 PROCEDURE — 85652 RBC SED RATE AUTOMATED: CPT | Performed by: STUDENT IN AN ORGANIZED HEALTH CARE EDUCATION/TRAINING PROGRAM

## 2025-05-07 PROCEDURE — 99285 EMERGENCY DEPT VISIT HI MDM: CPT | Mod: 25

## 2025-05-07 PROCEDURE — 85007 BL SMEAR W/DIFF WBC COUNT: CPT | Performed by: STUDENT IN AN ORGANIZED HEALTH CARE EDUCATION/TRAINING PROGRAM

## 2025-05-07 PROCEDURE — 96374 THER/PROPH/DIAG INJ IV PUSH: CPT

## 2025-05-07 PROCEDURE — 99222 1ST HOSP IP/OBS MODERATE 55: CPT | Mod: FS | Performed by: HOSPITALIST

## 2025-05-07 PROCEDURE — 85027 COMPLETE CBC AUTOMATED: CPT | Performed by: STUDENT IN AN ORGANIZED HEALTH CARE EDUCATION/TRAINING PROGRAM

## 2025-05-07 PROCEDURE — G0378 HOSPITAL OBSERVATION PER HR: HCPCS

## 2025-05-07 PROCEDURE — 86140 C-REACTIVE PROTEIN: CPT | Performed by: STUDENT IN AN ORGANIZED HEALTH CARE EDUCATION/TRAINING PROGRAM

## 2025-05-07 PROCEDURE — 250N000013 HC RX MED GY IP 250 OP 250 PS 637: Performed by: NURSE PRACTITIONER

## 2025-05-07 PROCEDURE — 74177 CT ABD & PELVIS W/CONTRAST: CPT

## 2025-05-07 PROCEDURE — 82040 ASSAY OF SERUM ALBUMIN: CPT | Performed by: STUDENT IN AN ORGANIZED HEALTH CARE EDUCATION/TRAINING PROGRAM

## 2025-05-07 PROCEDURE — 73502 X-RAY EXAM HIP UNI 2-3 VIEWS: CPT

## 2025-05-07 PROCEDURE — 81001 URINALYSIS AUTO W/SCOPE: CPT | Performed by: STUDENT IN AN ORGANIZED HEALTH CARE EDUCATION/TRAINING PROGRAM

## 2025-05-07 PROCEDURE — 250N000011 HC RX IP 250 OP 636: Performed by: STUDENT IN AN ORGANIZED HEALTH CARE EDUCATION/TRAINING PROGRAM

## 2025-05-07 PROCEDURE — 74177 CT ABD & PELVIS W/CONTRAST: CPT | Mod: 26 | Performed by: RADIOLOGY

## 2025-05-07 PROCEDURE — 96376 TX/PRO/DX INJ SAME DRUG ADON: CPT

## 2025-05-07 PROCEDURE — 250N000011 HC RX IP 250 OP 636: Mod: JZ | Performed by: STUDENT IN AN ORGANIZED HEALTH CARE EDUCATION/TRAINING PROGRAM

## 2025-05-07 PROCEDURE — 83605 ASSAY OF LACTIC ACID: CPT | Performed by: STUDENT IN AN ORGANIZED HEALTH CARE EDUCATION/TRAINING PROGRAM

## 2025-05-07 PROCEDURE — 99222 1ST HOSP IP/OBS MODERATE 55: CPT | Performed by: NURSE PRACTITIONER

## 2025-05-07 RX ORDER — IOPAMIDOL 755 MG/ML
92 INJECTION, SOLUTION INTRAVASCULAR ONCE
Status: COMPLETED | OUTPATIENT
Start: 2025-05-07 | End: 2025-05-07

## 2025-05-07 RX ORDER — ONDANSETRON 2 MG/ML
4 INJECTION INTRAMUSCULAR; INTRAVENOUS EVERY 6 HOURS PRN
Status: ACTIVE | OUTPATIENT
Start: 2025-05-07

## 2025-05-07 RX ORDER — SODIUM BICARBONATE 650 MG/1
650 TABLET ORAL 2 TIMES DAILY
Status: DISPENSED | OUTPATIENT
Start: 2025-05-07

## 2025-05-07 RX ORDER — METHOCARBAMOL 500 MG/1
500 TABLET, FILM COATED ORAL EVERY 6 HOURS PRN
Status: ACTIVE | OUTPATIENT
Start: 2025-05-07

## 2025-05-07 RX ORDER — MORPHINE SULFATE 2 MG/ML
2 INJECTION, SOLUTION INTRAMUSCULAR; INTRAVENOUS ONCE
Refills: 0 | Status: DISCONTINUED | OUTPATIENT
Start: 2025-05-07 | End: 2025-05-07

## 2025-05-07 RX ORDER — OXYCODONE HYDROCHLORIDE 5 MG/1
5 TABLET ORAL ONCE
Refills: 0 | Status: COMPLETED | OUTPATIENT
Start: 2025-05-07 | End: 2025-05-08

## 2025-05-07 RX ORDER — DABIGATRAN ETEXILATE 75 MG/1
75 CAPSULE ORAL 2 TIMES DAILY
Status: DISPENSED | OUTPATIENT
Start: 2025-05-07

## 2025-05-07 RX ORDER — POTASSIUM CHLORIDE 750 MG/1
20 CAPSULE, EXTENDED RELEASE ORAL DAILY
Status: DISPENSED | OUTPATIENT
Start: 2025-05-08

## 2025-05-07 RX ORDER — OMEPRAZOLE 20 MG/1
40 CAPSULE, DELAYED RELEASE ORAL DAILY
Status: DISCONTINUED | OUTPATIENT
Start: 2025-05-08 | End: 2025-05-07

## 2025-05-07 RX ORDER — FUROSEMIDE 20 MG/1
40 TABLET ORAL DAILY
Status: DISPENSED | OUTPATIENT
Start: 2025-05-08

## 2025-05-07 RX ORDER — HYDROMORPHONE HYDROCHLORIDE 1 MG/ML
0.5 INJECTION, SOLUTION INTRAMUSCULAR; INTRAVENOUS; SUBCUTANEOUS ONCE
Refills: 0 | Status: COMPLETED | OUTPATIENT
Start: 2025-05-07 | End: 2025-05-07

## 2025-05-07 RX ORDER — BISACODYL 5 MG
5 TABLET, DELAYED RELEASE (ENTERIC COATED) ORAL DAILY PRN
Status: ACTIVE | OUTPATIENT
Start: 2025-05-07

## 2025-05-07 RX ORDER — PROCHLORPERAZINE MALEATE 5 MG/1
5 TABLET ORAL EVERY 6 HOURS PRN
Status: ACTIVE | OUTPATIENT
Start: 2025-05-07

## 2025-05-07 RX ORDER — CLOPIDOGREL BISULFATE 75 MG/1
75 TABLET ORAL DAILY
Status: DISPENSED | OUTPATIENT
Start: 2025-05-08

## 2025-05-07 RX ORDER — ACETAMINOPHEN 325 MG/1
975 TABLET ORAL 3 TIMES DAILY
Status: DISPENSED | OUTPATIENT
Start: 2025-05-08

## 2025-05-07 RX ORDER — TIMOLOL MALEATE 5 MG/ML
1 SOLUTION/ DROPS OPHTHALMIC DAILY
Status: DISPENSED | OUTPATIENT
Start: 2025-05-08

## 2025-05-07 RX ORDER — LOSARTAN POTASSIUM 25 MG/1
25 TABLET ORAL 2 TIMES DAILY
Status: DISPENSED | OUTPATIENT
Start: 2025-05-07

## 2025-05-07 RX ORDER — PANTOPRAZOLE SODIUM 40 MG/1
40 TABLET, DELAYED RELEASE ORAL
Status: DISPENSED | OUTPATIENT
Start: 2025-05-08

## 2025-05-07 RX ORDER — OXYCODONE HYDROCHLORIDE 5 MG/1
5 TABLET ORAL EVERY 4 HOURS PRN
Status: ACTIVE | OUTPATIENT
Start: 2025-05-07

## 2025-05-07 RX ORDER — ONDANSETRON 4 MG/1
4 TABLET, ORALLY DISINTEGRATING ORAL EVERY 6 HOURS PRN
Status: ACTIVE | OUTPATIENT
Start: 2025-05-07

## 2025-05-07 RX ORDER — BISACODYL 5 MG
10 TABLET, DELAYED RELEASE (ENTERIC COATED) ORAL DAILY PRN
Status: ACTIVE | OUTPATIENT
Start: 2025-05-07

## 2025-05-07 RX ORDER — AMLODIPINE BESYLATE 5 MG/1
5 TABLET ORAL DAILY
Status: DISPENSED | OUTPATIENT
Start: 2025-05-08

## 2025-05-07 RX ORDER — ONDANSETRON 2 MG/ML
4 INJECTION INTRAMUSCULAR; INTRAVENOUS ONCE
Status: COMPLETED | OUTPATIENT
Start: 2025-05-07 | End: 2025-05-07

## 2025-05-07 RX ORDER — ISOSORBIDE MONONITRATE 60 MG/1
120 TABLET, EXTENDED RELEASE ORAL DAILY
Status: DISPENSED | OUTPATIENT
Start: 2025-05-08

## 2025-05-07 RX ORDER — POLYETHYLENE GLYCOL 3350 17 G/17G
17 POWDER, FOR SOLUTION ORAL 2 TIMES DAILY PRN
Status: ACTIVE | OUTPATIENT
Start: 2025-05-07

## 2025-05-07 RX ORDER — LIDOCAINE 40 MG/G
CREAM TOPICAL
Status: ACTIVE | OUTPATIENT
Start: 2025-05-07

## 2025-05-07 RX ADMIN — HYDROMORPHONE HYDROCHLORIDE 0.5 MG: 1 INJECTION, SOLUTION INTRAMUSCULAR; INTRAVENOUS; SUBCUTANEOUS at 14:18

## 2025-05-07 RX ADMIN — HYDROMORPHONE HYDROCHLORIDE 0.5 MG: 1 INJECTION, SOLUTION INTRAMUSCULAR; INTRAVENOUS; SUBCUTANEOUS at 19:27

## 2025-05-07 RX ADMIN — SODIUM BICARBONATE 650 MG TABLET 650 MG: at 23:29

## 2025-05-07 RX ADMIN — LOSARTAN POTASSIUM 25 MG: 25 TABLET, FILM COATED ORAL at 23:29

## 2025-05-07 RX ADMIN — IOPAMIDOL 92 ML: 755 INJECTION, SOLUTION INTRAVENOUS at 16:38

## 2025-05-07 RX ADMIN — ONDANSETRON 4 MG: 2 INJECTION, SOLUTION INTRAMUSCULAR; INTRAVENOUS at 14:18

## 2025-05-07 RX ADMIN — IOPAMIDOL 92 ML: 755 INJECTION, SOLUTION INTRAVENOUS at 20:42

## 2025-05-07 RX ADMIN — DABIGATRAN ETEXILATE MESYLATE 75 MG: 75 CAPSULE ORAL at 23:29

## 2025-05-07 RX ADMIN — METOPROLOL TARTRATE 125 MG: 100 TABLET, FILM COATED ORAL at 23:29

## 2025-05-07 ASSESSMENT — ACTIVITIES OF DAILY LIVING (ADL)
ADLS_ACUITY_SCORE: 59

## 2025-05-07 NOTE — PROGRESS NOTES
Assessment & Plan     Acute hip pain, left  While she points to L post hip as tender area she says pain is LLQ, L flank and L hip. Area of max tender and pain w/ wt bear point to hip. Comfort and function greatly limited, and this also makes her a fall risk, after long talk about how we cannot do a quick and good enough clinic testing, and get her figured out by end of day, we send her to ER, I call ahead.   The longitudinal plan of care for the diagnosis(es)/condition(s) as documented were addressed during this visit. Due to the added complexity in care, I will continue to support Tessa in the subsequent management and with ongoing continuity of care.  33 minutes spent by me on the date of the encounter doing chart review, history and exam, documentation and further activities per the note            We do review interval provider/ER/inpt visits as well    Subjective   Tessa is a 89 year old, presenting for the following health issues:  Follow Up (Acute pain lower left abdomen around left hip as of this morning) and Medication Request (Pt started a new blood thinner since last procedure, but doesn't know name of medication. Needs to be added to chart?)      5/7/2025    12:05 PM   Additional Questions   Roomed by EM   Accompanied by      History of Present Illness       Reason for visit:  Acute pain around left hip  Symptom onset:  Today  Symptoms include:  Stabbing and shooting pain  Symptom intensity:  Severe  Symptom progression:  Worsening  Had these symptoms before:  No  What makes it worse:  Movement  What makes it better:  Nothing tried today   She is taking medications regularly.        Per pt and  who is here, was doing well other than recurrence of bliat leg edema over last three days ithout dyspnea or wt gain, then about four hours before visit walking in bathroom sudden onset pain she describes as encompassing LLQ, L flank, L hip, but she points to posterior L buttock and when she  touces it is exquisetly painful she states and does in front of me then winces. No trauma. Last dexa rvwd, sees renal routinely their notes reviewed along w/ cardiology notes. No fever n/v or change bowel or urine habit. Pain hurts more to wt bear, hurts at rest, hurts even to rotate and twist trunk while sitting.    Can barely function since onset  helps w/ all things since then    Past Medical History:   Diagnosis Date    CAD (coronary artery disease)     CAD s/p PCI+BMS to MRCA 10/2002, PCI to mLCX 2/2003, PCI+DESx2 to pLAD 11/2007, PCI+DESx1 to dRCA 12/2007, PCI+ALVAREZ to RPDA 7/2013; on indefinite DAPT  10/27/2002    10/27/2002: AMI s/p PCI+BMS (3.5x18mm Bx velocity) to mRCA 2/5/2003: Back pain; PCI+stent to mLCX c/b distal embolization/slow flow 4/11/2003: Unstable angina; PCI+stent (Hepacoat velocity) to mLAD 11/27/2007: PCI+DESx2 to pLAD (IVUS w/ calcification of LMCA and ulcerated plaque pLAD, 80% dRCA; also had 80% dLCX, too small to stent) 12/11/2007: Staged PCI. PCI+DESx1 (3.5x13mm Cypher) to dRCA (indefinite DAPT recommended at this time) 6/27/2008: Angina. mLCX stent 70% ISR. LAD and RCA stents patent. Myocardium at risk from LCX felt to be small, medical management preferred to PCI. 11/10/2009: Angina. Findings unchanged from 6/27/2008, FFR LAD 0.90. Medical management recommended. 7/23/2013: Unstable angina; PCI+ALVAREZ to mPDA. Diagnostic findings: LMCA 40% distal. LAD: pLAD stents patent mLAD 30% ISR dLAD 50% diffuse, D2 diffuse disease. LCX 80% mid ISR. RCA diffuse <30% mid, RPLAs diffuse disease, RPDA 100% occlusion.      Cardiac Pacemaker- Medtronic, dual chamber- NOT dependant 11/28/2007    CKD (chronic kidney disease), stage IV (H)     Hyperlipidemia LDL goal <70     Hypertension     Stented coronary artery 10-    RCA    Stented coronary artery 2-5-2003    LCx    Stented coronary artery 4-    LAD    Stented coronary artery 11-    LAD    Stented coronary artery 12-     RCA    Transient complete heart block (H) 11/28/2007     Past Surgical History:   Procedure Laterality Date    ANESTHESIA CARDIOVERSION N/A 4/30/2025    Procedure: Anesthesia cardioversion;  Surgeon: GENERIC ANESTHESIA PROVIDER;  [facility]    CHOLECYSTECTOMY      CV CORONARY ANGIOGRAM N/A 6/17/2022    Procedure: Coronary Angiogram;  Surgeon: Constantine Macias MD;  Location:  [facility] CARD[facility] CATH [facility]    CV CORONARY ANGIOGRAM N/A 7/17/2023    Procedure: Coronary Angiogram;  Surgeon: Constantine Macias MD;  Location:  [facility] CARD[facility] CATH [facility]    CV LEFT ATRIAL APPENDAGE CLOSURE N/A 8/8/2024    Procedure: Left Atrial Appendage Closure;  Surgeon: Rodrick Garcia MD;  Location:  [facility] CARD[facility] CATH [facility]    CV PCI N/A 6/17/2022    Procedure: Percutaneous Coronary Intervention;  Surgeon: Constantine Macias MD;  Location:  [facility] CARDI[facility] CATH [facility]    CV PCI N/A 7/17/2023    Procedure: Percutaneous Coronary Intervention;  Surgeon: Constantine Macias MD;  Location:  [facility]ARD[facility] CATH [facility]    CV PCI N/A 11/6/2023    Procedure: Percutaneous Coronary Intervention;  Surgeon: Constantine Macias MD;  Location:  [facility] CARDI[facility] CATH [facility]    CV PERICARDIOCENTESIS N/A 8/8/2024    Procedure: Pericardiocentesis;  Surgeon: Rodrick Garcia MD;  Location:  HE[facility] CARDIAC CATH LAB    CV PERICARDIOCENTESIS N/A 8/11/2024    Procedure: Pericardial Drain Adjustment, Possible New Pericardial Drain Placement;  Surgeon: Rodrick Garcia MD;  Location:  HE[facility] CARDIAC CATH LAB    CV RIGHT HEART CATH MEASUREMENTS RECORDED N/A 6/17/2022    Procedure: Right Heart Catheterization;  Surgeon: Constantine Macias MD;  Location:  HEART CARDIAC CATH LAB    EP PACEMAKER N/A 10/15/2019    Procedure: EP PACEMAKER;  Surgeon: Anthony Zhu MD;  Location:  HEART CARDIAC CATH LAB    ESOPHAGOSCOPY, GASTROSCOPY, DUODENOSCOPY (EGD), COMBINED N/A 7/20/2023    Procedure: Esophagoscopy, gastroscopy, duodenoscopy  (EGD), combined;  Surgeon: Bekah Dash DO;  [facility]: [facility]    ESOPHAGOSCOPY, GASTROSCOPY, DUODENOSCOPY (EGD), COMBINED N/A 5/2/2024    Procedure: Esophagoscopy, gastroscopy, duodenoscopy (EGD), combined;  Surgeon: Tavo Donaldson MD;  Location: [facility]    HC PPM INSERTION NEW/REPLACEMENT W/ ATRIAL&VENTRICULAR LEAD  11-    HYSTERECTOMY      LAPAROTOMY EXPLORATORY N/A 4/13/2024    Procedure: Laparotomy exploratory, Wound Vac Placement.;  Surgeon: Anthony Trammell MD;  [facility]    LAPAROTOMY EXPLORATORY N/A 4/14/2024    Procedure: Abdominal Re-Exploration and Closure;  Surgeon: Anthony Trammell MD;  [facility]    LAPAROTOMY EXPLORATORY N/A 4/13/2024    Procedure: Exploratory Laparotomy;  Surgeon: Anthony Trammell MD;  [facility]     No current facility-administered medications for this visit.     Current Outpatient Medications   Medication Sig Dispense Refill    acetaminophen (TYLENOL) 325 MG tablet Take 975 mg by mouth nightly as needed for pain      allopurinol (ZYLOPRIM) 100 MG tablet TAKE 1 TABLET BY MOUTH ONCE DAILY *NEEDS TO HAVE LABS FOR FURTHER REFILLS* 90 tablet 0    amLODIPine (NORVASC) 5 MG tablet Take 1 tablet (5 mg) by mouth daily. 90 tablet 3    clopidogrel (PLAVIX) 75 MG tablet Take 1 tablet (75 mg) by mouth daily. 90 tablet 3    dabigatran ANTICOAGULANT (PRADAXA) 75 MG capsule Take 1 capsule (75 mg) by mouth 2 times daily. Store in original 's bottle or blister pack; use within 120 days of opening. 60 capsule 0    furosemide (LASIX) 40 MG tablet Take 1 tablet (40 mg) by mouth daily May take an additional 20 mg at noon as needed for swelling. 90 tablet 2    isosorbide mononitrate (IMDUR) 30 MG 24 hr tablet Take 4 tablets (120 mg) by mouth daily.      lidocaine (LIDODERM) 5 % patch Place 1 patch over 12 hours onto the skin every 24 hours. To prevent lidocaine toxicity, patient should be patch free for 12 hrs daily. 10 patch 0    losartan  (COZAAR) 25 MG tablet Take 1 tablet (25 mg) by mouth 2 times daily. 180 tablet 3    methocarbamol (ROBAXIN) 500 MG tablet Take 1 tablet (500 mg) by mouth every 6 hours as needed for muscle spasms. 30 tablet 5    metoprolol tartrate (LOPRESSOR) 25 MG tablet Take 5 tablets (125 mg) by mouth 2 times daily.      Multiple Vitamins-Minerals (PRESERVISION AREDS 2+MULTI VIT PO) Take 1 capsule by mouth 2 times daily      omeprazole (PRILOSEC) 40 MG DR capsule Take 1 capsule (40 mg) by mouth daily. 90 capsule 3    potassium chloride ER (K-TAB) 20 MEQ CR tablet Take 1 tablet (20 mEq) by mouth daily 90 tablet 3    psyllium (METAMUCIL/KONSYL) Packet Take 1 packet by mouth daily as needed for constipation.      rosuvastatin (CRESTOR) 20 MG tablet Take 1 tablet by mouth once daily 90 tablet 0    sodium bicarbonate 650 MG tablet Take 1 tablet (650 mg) by mouth 2 times daily 60 tablet 11    timolol maleate (TIMOPTIC) 0.5 % ophthalmic solution INSTILL 1 DROP INTO EACH EYE ONCE DAILY IN THE MORNING      vitamin B-12 (CYANOCOBALAMIN) 500 MCG tablet Take 1 tablet by mouth daily      vitamin D3 25 mcg (1000 units) tablet Take 1 tablet (25 mcg) by mouth every other day 90 tablet 3     Facility-Administered Medications Ordered in Other Visits   Medication Dose Route Frequency Provider Last Rate Last Admin    HYDROmorphone (PF) (DILAUDID) injection 0.5 mg  0.5 mg Intravenous Once Melo Blackman MD        oxyCODONE (ROXICODONE) tablet 5 mg  5 mg Oral Once Melo Blackman MD         Allergies   Allergen Reactions    Shrimp Swelling    Codeine Sulfate Itching    Indomethacin      Other Reaction(s): Not available    Levofloxacin Hemihydrate      Other Reaction(s): Not available    Morphine Sulfate      Other Reaction(s): Not available    Oxycodone Other (See Comments)    Shrimp Extract Swelling    Sulfamethoxazole W/Trimethoprim      Other Reaction(s): Not available    Chlorthalidone Nausea and Vomiting     Other Reaction(s): Not available  "   Clarithromycin      Other Reaction(s): Not available    Clonidine      Other Reaction(s): Not available    Darvon [Propoxyphene Hcl]     Gabapentin Confusion and Fatigue     \"felt drunk\"    Other Reaction(s): Not available    Hydromorphone Hallucination and Visual Disturbance     Tolerated in April 2024 hospital admissions    Other Reaction(s): Not available    Indomethacin     Percocet [Oxycodone-Acetaminophen] Hallucination    Pregabalin Fatigue and Itching    Simvastatin Palpitations and Cramps     Muscle weakness, leg cramping    Spironolactone      Dehydrated    Other Reaction(s): Not available    Terazosin      Other Reaction(s): Not available     Family History   Problem Relation Age of Onset    Cancer Sister 82        bladder cancer     Social History     Socioeconomic History    Marital status:      Spouse name: Not on file    Number of children: 4    Years of education: Not on file    Highest education level: Not on file   Occupational History     Employer: [employer]   Tobacco Use    Smoking status: Former     Current packs/day: 0.30     Average packs/day: 0.3 packs/day for 15.0 years (4.5 ttl pk-yrs)     Types: Cigarettes    Smokeless tobacco: Never    Tobacco comments:     Quit 35+ years ago   Vaping Use    Vaping status: Never Used   Substance and Sexual Activity    Alcohol use: Not on file    Drug use: No    Sexual activity: Not Currently     Partners: Male     Birth control/protection: Post-menopausal   Other Topics Concern     Service Not Asked    Blood Transfusions Yes    Caffeine Concern Not Asked    Occupational Exposure Not Asked    Hobby Hazards Not Asked    Sleep Concern Not Asked    Stress Concern Not Asked    Weight Concern Not Asked    Special Diet Not Asked    Back Care Not Asked    Exercise No    Bike Helmet Not Asked    Seat Belt Not Asked    Self-Exams Not Asked    Parent/sibling w/ CABG, MI or angioplasty before 65F 55M? Not Asked   Social History " Narrative    Not on file     Social Drivers of Health     Financial Resource Strain: Low Risk  (4/2/2025)    Financial Resource Strain     Within the past 12 months, have you or your family members you live with been unable to get utilities (heat, electricity) when it was really needed?: No   Food Insecurity: Low Risk  (4/2/2025)    Food Insecurity     Within the past 12 months, did you worry that your food would run out before you got money to buy more?: No     Within the past 12 months, did the food you bought just not last and you didn t have money to get more?: No   Transportation Needs: Low Risk  (4/2/2025)    Transportation Needs     Within the past 12 months, has lack of transportation kept you from medical appointments, getting your medicines, non-medical meetings or appointments, work, or from getting things that you need?: No   Physical Activity: Not on file   Stress: Not on file   Social Connections: Not on file   Interpersonal Safety: Low Risk  (4/2/2025)    Interpersonal Safety     Do you feel physically and emotionally safe where you currently live?: Yes     Within the past 12 months, have you been hit, slapped, kicked or otherwise physically hurt by someone?: No     Within the past 12 months, have you been humiliated or emotionally abused in other ways by your partner or ex-partner?: No   Housing Stability: High Risk (4/2/2025)    Housing Stability     Do you have housing? : No     Are you worried about losing your housing?: No         Objective    BP (!) 179/80 (BP Location: Left arm, Patient Position: Sitting, Cuff Size: Adult Regular)   Pulse 72   Temp 98.1  F (36.7  C) (Oral)   Resp 16   SpO2 99%   There is no height or weight on file to calculate BMI.  Physical Exam   GENERAL: alert awake in distress of pain  ABDOMEN: soft, nontender, no hepatosplenomegaly, no masses and bowel sounds normal  MS: no gross musculoskeletal defects noted; one plus bilat lower leg edema; very tender left posterior  hip area no palpable defect              Signed Electronically by: Pedro Gomez MD

## 2025-05-07 NOTE — ED PROVIDER NOTES
[place] EMERGENCY DEPARTMENT ([facility])    5/07/25       ED PROVIDER NOTE     History     Chief Complaint   Patient presents with    Hip Pain     The history is provided by the patient and medical records.     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 The patient states that she felt normal upon waking this morning, but began experiencing pain in her left hip after taking a shower today. She reports pain radiating from her left low back to her left hip and around to her left lower abdominal quadrant. She states that she could hardly walk after the shower today due to pain. The patient denies any fever, vomiting, or abnormal bleeding. No recent falls or injuries.     Per chart review, the patient was recently admitted to the hospital from 4/1-4/9 for severe worsening pain, particularly in the right knee, back, hip, and foot c/f trauma vs infectious/septic vs inflammatory cause of pain. She was found to be in Afib with RVR at this time, so her lopressor was increased from 100 mg BID to 125 mg BID. She continued to be in Afib with RVR on device interrogation on 4/23/25. Patient underwent MICHELL/DCCV on 4/30/25.     Past Medical History  Past Medical History:   Diagnosis Date    CAD (coronary artery disease)     CAD s/p PCI+BMS to MRCA 10/2002, PCI to mLCX 2/2003, PCI+DESx2 to pLAD 11/2007, PCI+DESx1 to dRCA 12/2007, PCI+ALVAREZ to RPDA 7/2013; on indefinite DAPT  10/27/2002    10/27/2002: AMI s/p PCI+BMS (3.5x18mm Bx velocity) to mRCA 2/5/2003: Back pain; PCI+stent to mLCX c/b distal embolization/slow flow 4/11/2003: Unstable angina; PCI+stent (Hepacoat velocity) to mLAD 11/27/2007: PCI+DESx2 to pLAD (IVUS w/ calcification of LMCA and  ulcerated plaque pLAD, 80% dRCA; also had 80% dLCX, too small to stent) 12/11/2007: Staged PCI. PCI+DESx1 (3.5x13mm Cypher) to dRCA (indefinite DAPT recommended at this time) 6/27/2008: Angina. mLCX stent 70% ISR. LAD and RCA stents patent. Myocardium at risk from LCX felt to be small, medical management preferred to PCI. 11/10/2009: Angina. Findings unchanged from 6/27/2008, FFR LAD 0.90. Medical management recommended. 7/23/2013: Unstable angina; PCI+ALVAREZ to mPDA. Diagnostic findings: LMCA 40% distal. LAD: pLAD stents patent mLAD 30% ISR dLAD 50% diffuse, D2 diffuse disease. LCX 80% mid ISR. RCA diffuse <30% mid, RPLAs diffuse disease, RPDA 100% occlusion.      Cardiac Pacemaker- Medtronic, dual chamber- NOT dependant 11/28/2007    CKD (chronic kidney disease), stage IV (H)     Hyperlipidemia LDL goal <70     Hypertension     Stented coronary artery 10-    RCA    Stented coronary artery 2-5-2003    LCx    Stented coronary artery 4-    LAD    Stented coronary artery 11-    LAD    Stented coronary artery 12-    RCA    Transient complete heart block (H) 11/28/2007     Past Surgical History:   Procedure Laterality Date    ANESTHESIA CARDIOVERSION N/A 4/30/2025    Procedure: Anesthesia cardioversion;  Surgeon: GENERIC ANESTHESIA PROVIDER;  [facility]    [facility]      CV CORONARY ANGIOGRAM N/A 6/17/2022    Procedure: Coronary Angiogram;  Surgeon: Constantine Macias MD;  Location: [facility] CARDIAC CATH LAB    CV CORONARY ANGIOGRAM N/A 7/17/2023    Procedure: Coronary Angiogram;  Surgeon: Constantine Macias MD;  Location: [facility] CARDIAC CATH LAB    CV LEFT ATRIAL APPENDAGE CLOSURE N/A 8/8/2024    Procedure: Left Atrial Appendage Closure;  Surgeon: Rodrick Garcia MD;  Location: [facility] CARDIAC CATH LAB    CV PCI N/A 6/17/2022    Procedure: Percutaneous Coronary Intervention;  Surgeon: Constantine Macias MD;  Location: [facility] CARDIAC CATH LAB    CV PCI N/A  7/17/2023    Procedure: Percutaneous Coronary Intervention;  Surgeon: Constantine Macias MD;  Location:  HEART CARDIAC CATH LAB    CV PCI N/A 11/6/2023    Procedure: Percutaneous Coronary Intervention;  Surgeon: Constantine Macias MD;  Location:  HEART CARDIAC CATH LAB    CV PERICARDIOCENTESIS N/A 8/8/2024    Procedure: Pericardiocentesis;  Surgeon: Rodrick Garcia MD;  Location:  HEART CARDIAC CATH LAB    CV PERICARDIOCENTESIS N/A 8/11/2024    Procedure: Pericardial Drain Adjustment, Possible New Pericardial Drain Placement;  Surgeon: Rodrick Garcia MD;  Location:  HEART CARDIAC CATH LAB    CV RIGHT HEART CATH MEASUREMENTS RECORDED N/A 6/17/2022    Procedure: Right Heart Catheterization;  Surgeon: Constantine Macias MD;  Location:  HEART CARDIAC CATH LAB    EP PACEMAKER N/A 10/15/2019    Procedure: EP PACEMAKER;  Surgeon: Anthony Zhu MD;  Location:  HEART CARDIAC CATH LAB    ESOPHAGOSCOPY, GASTROSCOPY, DUODENOSCOPY (EGD), COMBINED N/A 7/20/2023    Procedure: Esophagoscopy, gastroscopy, duodenoscopy (EGD), combined;  Surgeon: Bekah Dash DO;  Location:  GI    ESOPHAGOSCOPY, GASTROSCOPY, DUODENOSCOPY (EGD), COMBINED N/A 5/2/2024    Procedure: Esophagoscopy, gastroscopy, duodenoscopy (EGD), combined;  Surgeon: Tavo Donaldson MD;  [facility]    HC PPM INSERTION NEW/REPLACEMENT W/ ATRIAL&VENTRICULAR LEAD  11-    HYSTERECTOMY      LAPAROTOMY EXPLORATORY N/A 4/13/2024    Procedure: Laparotomy exploratory, Wound Vac Placement.;  Surgeon: Anthony Trammell MD;  [facility]    LAPAROTOMY EXPLORATORY N/A 4/14/2024    Procedure: Abdominal Re-Exploration and Closure;  Surgeon: Anthony Trammell MD;  [facility]    LAPAROTOMY EXPLORATORY N/A 4/13/2024    Procedure: Exploratory Laparotomy;  Surgeon: Anthony Trammell MD;  [facility]: [facility]     acetaminophen (TYLENOL) 325 MG tablet  allopurinol (ZYLOPRIM) 100 MG tablet  amLODIPine (NORVASC) 5  "MG tablet  clopidogrel (PLAVIX) 75 MG tablet  dabigatran ANTICOAGULANT (PRADAXA) 75 MG capsule  furosemide (LASIX) 40 MG tablet  isosorbide mononitrate (IMDUR) 30 MG 24 hr tablet  lidocaine (LIDODERM) 5 % patch  losartan (COZAAR) 25 MG tablet  methocarbamol (ROBAXIN) 500 MG tablet  metoprolol tartrate (LOPRESSOR) 25 MG tablet  Multiple Vitamins-Minerals (PRESERVISION AREDS 2+MULTI VIT PO)  omeprazole (PRILOSEC) 40 MG DR capsule  potassium chloride ER (K-TAB) 20 MEQ CR tablet  psyllium (METAMUCIL/KONSYL) Packet  rosuvastatin (CRESTOR) 20 MG tablet  sodium bicarbonate 650 MG tablet  timolol maleate (TIMOPTIC) 0.5 % ophthalmic solution  vitamin B-12 (CYANOCOBALAMIN) 500 MCG tablet  vitamin D3 25 mcg (1000 units) tablet      Allergies   Allergen Reactions    Shrimp Swelling    Codeine Sulfate Itching    Indomethacin      Other Reaction(s): Not available    Levofloxacin Hemihydrate      Other Reaction(s): Not available    Morphine Sulfate      Other Reaction(s): Not available    Oxycodone Other (See Comments)    Shrimp Extract Swelling    Sulfamethoxazole W/Trimethoprim      Other Reaction(s): Not available    Chlorthalidone Nausea and Vomiting     Other Reaction(s): Not available    Clarithromycin      Other Reaction(s): Not available    Clonidine      Other Reaction(s): Not available    Darvon [Propoxyphene Hcl]     Gabapentin Confusion and Fatigue     \"felt drunk\"    Other Reaction(s): Not available    Hydromorphone Hallucination and Visual Disturbance     Tolerated in April 2024 hospital admissions    Other Reaction(s): Not available    Indomethacin     Percocet [Oxycodone-Acetaminophen] Hallucination    Pregabalin Fatigue and Itching    Simvastatin Palpitations and Cramps     Muscle weakness, leg cramping    Spironolactone      Dehydrated    Other Reaction(s): Not available    Terazosin      Other Reaction(s): Not available     Family History  Family History   Problem Relation Age of Onset    Cancer Sister 82        " "bladder cancer     Social History   Social History     Tobacco Use    Smoking status: Former     Current packs/day: 0.30     Average packs/day: 0.3 packs/day for 15.0 years (4.5 ttl pk-yrs)     Types: Cigarettes    Smokeless tobacco: Never    Tobacco comments:     Quit 35+ years ago   Vaping Use    Vaping status: Never Used   Substance Use Topics    Drug use: No      A medically appropriate review of systems was performed with pertinent positives and negatives noted in the HPI, and all other systems negative.    Physical Exam   BP: (!) 182/81  Pulse: 76  Temp: 97.6  F (36.4  C)  Resp: 16  SpO2: 98 %  Physical Exam  Vitals and nursing note reviewed.   Constitutional:       General: She is not in acute distress.     Appearance: Normal appearance. She is not diaphoretic.   HENT:      Head: Atraumatic.      Mouth/Throat:      Mouth: Mucous membranes are moist.   Eyes:      General: No scleral icterus.     Conjunctiva/sclera: Conjunctivae normal.   Cardiovascular:      Rate and Rhythm: Normal rate.      Heart sounds: Normal heart sounds.   Pulmonary:      Effort: No respiratory distress.      Breath sounds: Normal breath sounds.   Abdominal:      General: Abdomen is flat.   Musculoskeletal:      Cervical back: Neck supple.   Skin:     General: Skin is warm.      Findings: No rash.   Neurological:      Mental Status: She is alert.       ***    ED Course, Procedures, & Data      Procedures       {ED Course Selections (Optional):123413}  {ED Sepsis CMS Documentation (Optional):193093::\" \"}       No results found for any visits on 05/07/25.  Medications - No data to display  Labs Ordered and Resulted from Time of ED Arrival to Time of ED Departure - No data to display  No orders to display          {Critical Care Performed?:[id number]}    Assessment & Plan    ***    I have reviewed the nursing notes. I have reviewed the findings, diagnosis, plan and need for follow up with the patient.    New Prescriptions    No medications on " file       Final diagnoses:   None     ISoledad, am serving as a trained medical scribe to document services personally performed by Melo Blackman MD, based on the provider's statements to me.     Melo DENNEY MD, was physically present and have reviewed and verified the accuracy of this note documented by Soledad Michel.      Melo Blackman MD    [facility] EMERGENCY DEPARTMENT  5/7/2025

## 2025-05-07 NOTE — ED TRIAGE NOTES
PT arrives from home with new onset of 10/10 L hip pain. Denies fevers, trauma, falling, dysuria.      Triage Assessment (Adult)       Row Name 05/07/25 1340          Respiratory WDL    Respiratory WDL WDL        Skin Circulation/Temperature WDL    Skin Circulation/Temperature WDL WDL        Cardiac WDL    Cardiac WDL WDL        Peripheral/Neurovascular WDL    Peripheral Neurovascular WDL WDL        Cognitive/Neuro/Behavioral WDL    Cognitive/Neuro/Behavioral WDL WDL

## 2025-05-08 ENCOUNTER — APPOINTMENT (OUTPATIENT)
Dept: PHYSICAL THERAPY | Facility: CLINIC | Age: 89
End: 2025-05-08
Attending: NURSE PRACTITIONER
Payer: COMMERCIAL

## 2025-05-08 VITALS
OXYGEN SATURATION: 98 % | RESPIRATION RATE: 16 BRPM | HEART RATE: 62 BPM | DIASTOLIC BLOOD PRESSURE: 64 MMHG | SYSTOLIC BLOOD PRESSURE: 135 MMHG | TEMPERATURE: 97.5 F

## 2025-05-08 LAB — ERYTHROCYTE [SEDIMENTATION RATE] IN BLOOD BY WESTERGREN METHOD: 16 MM/HR (ref 0–30)

## 2025-05-08 PROCEDURE — G0378 HOSPITAL OBSERVATION PER HR: HCPCS

## 2025-05-08 PROCEDURE — 250N000013 HC RX MED GY IP 250 OP 250 PS 637: Performed by: INTERNAL MEDICINE

## 2025-05-08 PROCEDURE — 97116 GAIT TRAINING THERAPY: CPT | Mod: GP

## 2025-05-08 PROCEDURE — 97161 PT EVAL LOW COMPLEX 20 MIN: CPT | Mod: GP

## 2025-05-08 PROCEDURE — 250N000013 HC RX MED GY IP 250 OP 250 PS 637: Performed by: HOSPITALIST

## 2025-05-08 PROCEDURE — 250N000009 HC RX 250: Performed by: NURSE PRACTITIONER

## 2025-05-08 PROCEDURE — 99232 SBSQ HOSP IP/OBS MODERATE 35: CPT | Performed by: INTERNAL MEDICINE

## 2025-05-08 PROCEDURE — 250N000013 HC RX MED GY IP 250 OP 250 PS 637: Performed by: NURSE PRACTITIONER

## 2025-05-08 RX ORDER — NALOXONE HYDROCHLORIDE 0.4 MG/ML
0.4 INJECTION, SOLUTION INTRAMUSCULAR; INTRAVENOUS; SUBCUTANEOUS
Status: ACTIVE | OUTPATIENT
Start: 2025-05-08

## 2025-05-08 RX ORDER — NALOXONE HYDROCHLORIDE 0.4 MG/ML
0.2 INJECTION, SOLUTION INTRAMUSCULAR; INTRAVENOUS; SUBCUTANEOUS
Status: ACTIVE | OUTPATIENT
Start: 2025-05-08

## 2025-05-08 RX ORDER — TRAMADOL HYDROCHLORIDE 50 MG/1
50 TABLET ORAL EVERY 6 HOURS PRN
Status: ON HOLD | COMMUNITY

## 2025-05-08 RX ADMIN — FUROSEMIDE 40 MG: 20 TABLET ORAL at 08:15

## 2025-05-08 RX ADMIN — ACETAMINOPHEN 975 MG: 325 TABLET ORAL at 08:13

## 2025-05-08 RX ADMIN — DABIGATRAN ETEXILATE MESYLATE 75 MG: 75 CAPSULE ORAL at 22:15

## 2025-05-08 RX ADMIN — OXYCODONE HYDROCHLORIDE 5 MG: 5 TABLET ORAL at 00:27

## 2025-05-08 RX ADMIN — ACETAMINOPHEN 975 MG: 325 TABLET ORAL at 20:49

## 2025-05-08 RX ADMIN — METOPROLOL TARTRATE 125 MG: 100 TABLET, FILM COATED ORAL at 20:51

## 2025-05-08 RX ADMIN — ACETAMINOPHEN 975 MG: 325 TABLET ORAL at 14:08

## 2025-05-08 RX ADMIN — AMLODIPINE BESYLATE 5 MG: 5 TABLET ORAL at 08:15

## 2025-05-08 RX ADMIN — ISOSORBIDE MONONITRATE 120 MG: 60 TABLET, EXTENDED RELEASE ORAL at 08:14

## 2025-05-08 RX ADMIN — TIMOLOL MALEATE 1 DROP: 5 SOLUTION/ DROPS OPHTHALMIC at 08:26

## 2025-05-08 RX ADMIN — LOSARTAN POTASSIUM 25 MG: 25 TABLET, FILM COATED ORAL at 08:14

## 2025-05-08 RX ADMIN — DABIGATRAN ETEXILATE MESYLATE 75 MG: 75 CAPSULE ORAL at 11:06

## 2025-05-08 RX ADMIN — METOPROLOL TARTRATE 125 MG: 100 TABLET, FILM COATED ORAL at 08:26

## 2025-05-08 RX ADMIN — SODIUM BICARBONATE 650 MG TABLET 650 MG: at 08:12

## 2025-05-08 RX ADMIN — LOSARTAN POTASSIUM 25 MG: 25 TABLET, FILM COATED ORAL at 20:55

## 2025-05-08 RX ADMIN — SODIUM BICARBONATE 650 MG TABLET 650 MG: at 20:51

## 2025-05-08 RX ADMIN — PANTOPRAZOLE SODIUM 40 MG: 40 TABLET, DELAYED RELEASE ORAL at 08:14

## 2025-05-08 RX ADMIN — PANTOPRAZOLE SODIUM 40 MG: 40 TABLET, DELAYED RELEASE ORAL at 17:40

## 2025-05-08 RX ADMIN — CLOPIDOGREL BISULFATE 75 MG: 75 TABLET, FILM COATED ORAL at 08:14

## 2025-05-08 RX ADMIN — POTASSIUM CHLORIDE 20 MEQ: 750 CAPSULE, EXTENDED RELEASE ORAL at 08:13

## 2025-05-08 ASSESSMENT — ACTIVITIES OF DAILY LIVING (ADL)
ADLS_ACUITY_SCORE: 49
ADLS_ACUITY_SCORE: 49
ADLS_ACUITY_SCORE: 59
ADLS_ACUITY_SCORE: 45
ADLS_ACUITY_SCORE: 49
ADLS_ACUITY_SCORE: 48
ADLS_ACUITY_SCORE: 48
ADLS_ACUITY_SCORE: 49
ADLS_ACUITY_SCORE: 48
ADLS_ACUITY_SCORE: 59
ADLS_ACUITY_SCORE: 48
THE_FOLLOWING_AIDS_WERE_PROVIDED;: PATIENT DECLINED OFFER OF AUXILIARY AIDS
ADLS_ACUITY_SCORE: 48
ADLS_ACUITY_SCORE: 48
ADLS_ACUITY_SCORE: 59
ADLS_ACUITY_SCORE: 49
ADLS_ACUITY_SCORE: 48
ADLS_ACUITY_SCORE: 49
ADLS_ACUITY_SCORE: 49
DEPENDENT_IADLS:: INDEPENDENT
ADLS_ACUITY_SCORE: 49
ADLS_ACUITY_SCORE: 49
ADLS_ACUITY_SCORE: 59

## 2025-05-08 NOTE — PROGRESS NOTES
D: Patient transferred from ED [facility] via EMS for L hip pain.     I: Upon arrival to the unit patient was oriented to room, unit, and call light. Pt's vital signs were obtained. Allergies reviewed. Provider DENAnotified of patient s arrival on the unit. Adult AVS completed. Head to toe assessment completed. Education assessment completed. Care plan initiated.    A: Vital signs stable upon admission. Patient rates pain at 5/10 upon arrival. Two RN skin assessment completed with  RN Latanya. No Significant Skin Findings noted except for scattered bruises on both arms and hands.  P: Continue to monitor patient s condition and intervene as needed. Continue with plan of care. Notify provider with any concerns or changes in patient status.      VS: /78 (BP Location: Right arm, Patient Position: Semi-Lezama's)   Pulse 76   Temp 97.8  F (36.6  C) (Oral)   Resp 17   SpO2 98%     O2: SpO2 stable on RA. Denies chest pain and SOB.    Output: Voids spontaneously using BSC.  Continent   Last BM: 5/6 per pt, denies abdominal discomfort.    Activity: Assist of 1 w/ walker and GB for transfer to [facility].   Skin: Bruises on BUE.  BLE edema   Pain: Pain managed with meds. See MAR.    CMS: Intact, AOx4.   [place]- can hear w/ modulated voice.    Dressing: None   Diet: Regular diet. Denies nausea/vomiting.    LDA: L PIV SL   Equipment: IV pole, personal belongings,    Plan: Standard precautions maintained / Continue with plan of care. Call light within reach, pt able to make needs known.

## 2025-05-08 NOTE — ED PROVIDER NOTES
Emergency Department I-PASS Sign-out      Dr. Blackman.      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     CT extravasated. Will need to be admitted. Admitting discussing pending imaging.    CT scan without obvious pathology, plain films similarly reassuring.    Medical observation, additional pain control.  A.m. PT evaluation, +/- Ortho/MRI if symptoms worsen.    Olvin Faye MD   Emergency Medicine       Olvin Faye MD  05/08/25 0114

## 2025-05-08 NOTE — PHARMACY-ADMISSION MEDICATION HISTORY
Pharmacist Admission Medication History    Admission medication history is complete. The information provided in this note is only as accurate as the sources available at the time of the update.    Information Source(s): Patient via phone    Pertinent Information: Pt stated that she last took her medications yesterday. She was able to recognize all of her medications when read off of a list.     Changes made to PTA medication list:  Added: tramadol PRN  Deleted: None  Changed: None    Allergies reviewed with patient and updates made in EHR: yes    Medication History Completed By: Shanda Brunner [facility] 5/8/2025 11:23 AM    PTA Med List   Medication Sig Last Dose/Taking    acetaminophen (TYLENOL) 325 MG tablet Take 975 mg by mouth nightly as needed for pain 5/7/2025    allopurinol (ZYLOPRIM) 100 MG tablet TAKE 1 TABLET BY MOUTH ONCE DAILY *NEEDS TO HAVE LABS FOR FURTHER REFILLS* 5/7/2025    amLODIPine (NORVASC) 5 MG tablet Take 1 tablet (5 mg) by mouth daily. 5/7/2025    clopidogrel (PLAVIX) 75 MG tablet Take 1 tablet (75 mg) by mouth daily. 5/7/2025    dabigatran ANTICOAGULANT (PRADAXA) 75 MG capsule Take 1 capsule (75 mg) by mouth 2 times daily. Store in original 's bottle or blister pack; use within 120 days of opening. 5/7/2025    furosemide (LASIX) 40 MG tablet Take 1 tablet (40 mg) by mouth daily May take an additional 20 mg at noon as needed for swelling. 5/7/2025    isosorbide mononitrate (IMDUR) 30 MG 24 hr tablet Take 4 tablets (120 mg) by mouth daily. 5/7/2025    lidocaine (LIDODERM) 5 % patch Place 1 patch over 12 hours onto the skin every 24 hours. To prevent lidocaine toxicity, patient should be patch free for 12 hrs daily. 5/7/2025    losartan (COZAAR) 25 MG tablet Take 1 tablet (25 mg) by mouth 2 times daily. 5/7/2025    methocarbamol (ROBAXIN) 500 MG tablet Take 1 tablet (500 mg) by mouth every 6 hours as needed for muscle spasms. 5/7/2025    metoprolol tartrate (LOPRESSOR) 25 MG tablet Take 5  tablets (125 mg) by mouth 2 times daily. 5/7/2025    Multiple Vitamins-Minerals (PRESERVISION AREDS 2+MULTI VIT PO) Take 1 capsule by mouth 2 times daily 5/7/2025    omeprazole (PRILOSEC) 40 MG DR capsule Take 1 capsule (40 mg) by mouth daily. 5/7/2025    potassium chloride ER (K-TAB) 20 MEQ CR tablet Take 1 tablet (20 mEq) by mouth daily 5/7/2025    psyllium (METAMUCIL/KONSYL) Packet Take 1 packet by mouth daily as needed for constipation. 5/7/2025    rosuvastatin (CRESTOR) 20 MG tablet Take 1 tablet by mouth once daily 5/7/2025    sodium bicarbonate 650 MG tablet Take 1 tablet (650 mg) by mouth 2 times daily 5/7/2025    timolol maleate (TIMOPTIC) 0.5 % ophthalmic solution INSTILL 1 DROP INTO EACH EYE ONCE DAILY IN THE MORNING 5/7/2025    traMADol (ULTRAM) 50 MG tablet Take 50 mg by mouth every 6 hours as needed for severe pain. Past Week    vitamin B-12 (CYANOCOBALAMIN) 500 MCG tablet Take 1 tablet by mouth daily 5/7/2025    vitamin D3 25 mcg (1000 units) tablet Take 1 tablet (25 mcg) by mouth every other day 5/7/2025

## 2025-05-08 NOTE — PLAN OF CARE
Goal Outcome Evaluation:  Physical Therapy Discharge Summary    Reason for therapy discharge:    All goals and outcomes met, no further needs identified.    Progress towards therapy goal(s). See goals on Care Plan in [facility] electronic health record for goal details.  Goals met    Therapy recommendation(s):    No further therapy is recommended.

## 2025-05-08 NOTE — H&P
[facility]    History and Physical - Hospitalist Service, GOLD TEAM        Date of Admission:  5/7/2025    Assessment & Plan      Tessa Mansfield is a 89 year old woman admitted on 5/7/2025. 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    #) Left hip pain - The patient presents with left hip pain that came on today after a shower.  XR and CT scan are negative for fracture.  On exam she is able to move her left leg without significant distress and has no pain with me striking the bottom of her left foot.  - Oxycodone, tylenol PRN pain  - Consider MRI in the am if pain is persisting  - Consult PT to walk and assess mobility in the am    #) History of hypertension  - Continue home amlodipine, losartan, metoprolol    #) History of DVT, afib  - Continue home dabigatran  - Continue metoprolol    #) History of CAD s/p stenting - The patient notes twenty stents in total, none more recent than twelve years ago.  - Continue home plavix, isosorbide    #) History of heart failure with preserved ejection fraction  - Continue home furosemide    #) History of CKD  - Continue home sodium bicarb          Diet:  Regular diet  DVT Prophylaxis: DOAC  Bonner Catheter: Not present  Lines: None     Cardiac Monitoring: None  Code Status:  Full    Clinically Significant Risk Factors Present on Admission                # Drug Induced Coagulation Defect: home medication list includes an anticoagulant medication  # Drug Induced Platelet Defect: home medication list includes an antiplatelet medication   # Hypertension: Noted on problem list  # Chronic heart failure with preserved ejection fraction: heart failure noted on problem list and last echo with EF >50%              # Financial/Environmental Concerns:     #  Pacemaker present       Disposition Plan     Medically Ready for Discharge: Anticipated in 2-4 Days         The patient's care was discussed with the Attending Physician, Dr. Yang.    JI Elena [facility]  Hospitalist Service, [facility]  Securely message with [employer] (more info)  Text page via [facility] Paging/Directory   See signed in provider for up to date coverage information    ______________________________________________________________________    Chief Complaint   Left hip pain    History is obtained from the patient    History of Present Illness   Tessa Mansfield is a 89 year old woman admitted on 5/7/2025. 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    The patient tells me that today she was in the shower when she developed sharp hip/side pain.  The pain seems to originate from her left hip and radiates to her LLQ and her back.  It was so severe she felt she could not walk.    She denies any recent trauma or falls.  She tells me that when she is resting in bed her pain is mild, and even moving the left leg does not always provoke pain, but that rotating on to her right side does reproduce the pain.      Past Medical History    Past Medical History:   Diagnosis Date    CAD (coronary artery disease)     CAD s/p PCI+BMS to MRCA 10/2002, PCI to mLCX 2/2003, PCI+DESx2 to pLAD 11/2007, PCI+DESx1 to dRCA 12/2007, PCI+ALVAREZ to RPDA 7/2013; on indefinite DAPT  10/27/2002    10/27/2002: AMI s/p PCI+BMS (3.5x18mm Bx velocity) to mRCA 2/5/2003: Back pain; PCI+stent to mLCX c/b distal embolization/slow flow 4/11/2003: Unstable angina; PCI+stent (Hepacoat velocity) to mLAD 11/27/2007: PCI+DESx2 to pLAD (IVUS w/ calcification of LMCA and ulcerated plaque pLAD, 80% dRCA; also  had 80% dLCX, too small to stent) 12/11/2007: Staged PCI. PCI+DESx1 (3.5x13mm Cypher) to dRCA (indefinite DAPT recommended at this time) 6/27/2008: Angina. mLCX stent 70% ISR. LAD and RCA stents patent. Myocardium at risk from LCX felt to be small, medical management preferred to PCI. 11/10/2009: Angina. Findings unchanged from 6/27/2008, FFR LAD 0.90. Medical management recommended. 7/23/2013: Unstable angina; PCI+ALVAREZ to mPDA. Diagnostic findings: LMCA 40% distal. LAD: pLAD stents patent mLAD 30% ISR dLAD 50% diffuse, D2 diffuse disease. LCX 80% mid ISR. RCA diffuse <30% mid, RPLAs diffuse disease, RPDA 100% occlusion.      Cardiac Pacemaker- Medtronic, dual chamber- NOT dependant 11/28/2007    CKD (chronic kidney disease), stage IV (H)     Hyperlipidemia LDL goal <70     Hypertension     Stented coronary artery 10-    RCA    Stented coronary artery 2-5-2003    LCx    Stented coronary artery 4-    LAD    Stented coronary artery 11-    LAD    Stented coronary artery 12-    RCA    Transient complete heart block (H) 11/28/2007       Past Surgical History   Past Surgical History:   Procedure Laterality Date    ANESTHESIA CARDIOVERSION N/A 4/30/2025    Procedure: Anesthesia cardioversion;  Surgeon: GENERIC ANESTHESIA PROVIDER;  [facility] OR    [facility]      CV CORONARY ANGIOGRAM N/A 6/17/2022    Procedure: Coronary Angiogram;  Surgeon: Constantine Macias MD;  Location: [facility] CARDIAC CATH LAB    CV CORONARY ANGIOGRAM N/A 7/17/2023    Procedure: Coronary Angiogram;  Surgeon: Constantine Macias MD;  Location:  HEART CARDIAC CATH LAB    CV LEFT ATRIAL APPENDAGE CLOSURE N/A 8/8/2024    Procedure: Left Atrial Appendage Closure;  Surgeon: Rodrick Garcia MD;  Location: [facility] CARDIAC CATH LAB    CV PCI N/A 6/17/2022    Procedure: Percutaneous Coronary Intervention;  Surgeon: Constantine Macias MD;  Location: [facility] CARDIAC CATH LAB    CV PCI N/A 7/17/2023     Procedure: Percutaneous Coronary Intervention;  Surgeon: Constantine Macias MD;  Location:  HEART CARDIAC CATH LAB    CV PCI N/A 11/6/2023    Procedure: Percutaneous Coronary Intervention;  Surgeon: Constantine Macias MD;  Location:  HEART CARDIAC CATH LAB    CV PERICARDIOCENTESIS N/A 8/8/2024    Procedure: Pericardiocentesis;  Surgeon: Rodrick Garcia MD;  Location:  HEART CARDIAC CATH LAB    CV PERICARDIOCENTESIS N/A 8/11/2024    Procedure: Pericardial Drain Adjustment, Possible New Pericardial Drain Placement;  Surgeon: Rodrick Garcia MD;  Location:  HEART CARDIAC CATH LAB    CV RIGHT HEART CATH MEASUREMENTS RECORDED N/A 6/17/2022    Procedure: Right Heart Catheterization;  Surgeon: Constantine Macias MD;  Location:  HEART CARDIAC CATH LAB    EP PACEMAKER N/A 10/15/2019    Procedure: EP PACEMAKER;  Surgeon: Anthony Zhu MD;  Location:  HEART CARDIAC CATH LAB    ESOPHAGOSCOPY, GASTROSCOPY, DUODENOSCOPY (EGD), COMBINED N/A 7/20/2023    Procedure: Esophagoscopy, gastroscopy, duodenoscopy (EGD), combined;  Surgeon: Bekah Dash DO;  Location:  GI    ESOPHAGOSCOPY, GASTROSCOPY, DUODENOSCOPY (EGD), COMBINED N/A 5/2/2024    Procedure: Esophagoscopy, gastroscopy, duodenoscopy (EGD), combined;  Surgeon: Tavo Donaldson MD;  Location: [facility] GI    HC PPM INSERTION NEW/REPLACEMENT W/ ATRIAL&VENTRICULAR LEAD  11-    HYSTERECTOMY      LAPAROTOMY EXPLORATORY N/A 4/13/2024    Procedure: Laparotomy exploratory, Wound Vac Placement.;  Surgeon: Anthony Trammell MD;  Location: [facility] OR    LAPAROTOMY EXPLORATORY N/A 4/14/2024    Procedure: Abdominal Re-Exploration and Closure;  Surgeon: Anthony Trammell MD;  Location: [facility] OR    LAPAROTOMY EXPLORATORY N/A 4/13/2024    Procedure: Exploratory Laparotomy;  Surgeon: Anthony Trammell MD;  Location: [facility] OR       Prior to Admission Medications   Prior to Admission Medications   Prescriptions Last Dose Informant Patient  Reported? Taking?   Multiple Vitamins-Minerals (PRESERVISION AREDS 2+MULTI VIT PO)  Self Yes No   Sig: Take 1 capsule by mouth 2 times daily   acetaminophen (TYLENOL) 325 MG tablet  Self Yes No   Sig: Take 975 mg by mouth nightly as needed for pain   allopurinol (ZYLOPRIM) 100 MG tablet   No No   Sig: TAKE 1 TABLET BY MOUTH ONCE DAILY *NEEDS TO HAVE LABS FOR FURTHER REFILLS*   amLODIPine (NORVASC) 5 MG tablet   No No   Sig: Take 1 tablet (5 mg) by mouth daily.   clopidogrel (PLAVIX) 75 MG tablet   No No   Sig: Take 1 tablet (75 mg) by mouth daily.   dabigatran ANTICOAGULANT (PRADAXA) 75 MG capsule   No No   Sig: Take 1 capsule (75 mg) by mouth 2 times daily. Store in original 's bottle or blister pack; use within 120 days of opening.   furosemide (LASIX) 40 MG tablet  Self No No   Sig: Take 1 tablet (40 mg) by mouth daily May take an additional 20 mg at noon as needed for swelling.   isosorbide mononitrate (IMDUR) 30 MG 24 hr tablet   No No   Sig: Take 4 tablets (120 mg) by mouth daily.   lidocaine (LIDODERM) 5 % patch   No No   Sig: Place 1 patch over 12 hours onto the skin every 24 hours. To prevent lidocaine toxicity, patient should be patch free for 12 hrs daily.   losartan (COZAAR) 25 MG tablet   No No   Sig: Take 1 tablet (25 mg) by mouth 2 times daily.   methocarbamol (ROBAXIN) 500 MG tablet   No No   Sig: Take 1 tablet (500 mg) by mouth every 6 hours as needed for muscle spasms.   metoprolol tartrate (LOPRESSOR) 25 MG tablet   No No   Sig: Take 5 tablets (125 mg) by mouth 2 times daily.   omeprazole (PRILOSEC) 40 MG DR capsule   No No   Sig: Take 1 capsule (40 mg) by mouth daily.   potassium chloride ER (K-TAB) 20 MEQ CR tablet  Self No No   Sig: Take 1 tablet (20 mEq) by mouth daily   psyllium (METAMUCIL/KONSYL) Packet   Yes No   Sig: Take 1 packet by mouth daily as needed for constipation.   rosuvastatin (CRESTOR) 20 MG tablet   No No   Sig: Take 1 tablet by mouth once daily   sodium  bicarbonate 650 MG tablet   No No   Sig: Take 1 tablet (650 mg) by mouth 2 times daily   timolol maleate (TIMOPTIC) 0.5 % ophthalmic solution  Self Yes No   Sig: INSTILL 1 DROP INTO EACH EYE ONCE DAILY IN THE MORNING   vitamin B-12 (CYANOCOBALAMIN) 500 MCG tablet  Self Yes No   Sig: Take 1 tablet by mouth daily   vitamin D3 25 mcg (1000 units) tablet  Self No No   Sig: Take 1 tablet (25 mcg) by mouth every other day      Facility-Administered Medications: None           Physical Exam   Vital Signs: Temp: 97.6  F (36.4  C) Temp src: Oral BP: 136/70 Pulse: 70   Resp: 16 SpO2: 95 % O2 Device: None (Room air)      Physical Exam   Constitutional:   Well nourished, well developed, resting comfortably   Cardiovascular: Regular rate and rhythm without murmurs or gallops  Pulmonary/Chest: Clear to auscultation bilaterally, with no wheezes or retractions. No respiratory distress.  GI: Soft with good bowel sounds.  Non-tender, non-distended, with no guarding, no rebound, no peritoneal signs.   Musculoskeletal:  No edema or clubbing   Skin: Skin is warm and dry. No rash noted.   Neurological: Alert and oriented to person, place, and time. Nonfocal exam  Psychiatric:  Normal mood and affect.      Medical Decision Making       30 MINUTES SPENT BY ME on the date of service doing chart review, history, exam, documentation & further activities per the note.      Data   CBC, CMP, Xray hip and CT scan reviewed.

## 2025-05-08 NOTE — PROGRESS NOTES
05/08/25 1240   Appointment Info   Signing Clinician's Name / Credentials (PT) Tari Graham DPt   Living Environment   People in Home spouse   Current Living Arrangements house   Home Accessibility stairs within home;stairs to enter home   Number of Stairs, Main Entrance 2   Stair Railings, Main Entrance none  (Pt states son plans to enstall a rail for entering from the garage but is not sure if it has been completed. The front door does not have any stairs.)   Number of Stairs, Within Home, Primary seven   Stair Railings, Within Home, Primary railings on both sides of stairs   Living Environment Comments Pt lives in a split level house with 7 stairs and 2HR between levels.   Self-Care   Usual Activity Tolerance good   Current Activity Tolerance fair   Equipment Currently Used at Home cane, straight;walker, rolling;shower chair;wheelchair, manual   Number of times patient has fallen within last six months 1   General Information   Onset of Illness/Injury or Date of Surgery 05/07/25   Referring Physician Srini Barakat APRN CNP   Pertinent History of Current Problem (include personal factors and/or comorbidities that impact the POC) Tessa Mansfield is a 89 year old woman admitted on 5/7/2025. 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 XR and CT scan are negative for fracture.   Existing Precautions/Restrictions fall   Weight-Bearing Status - LLE weight-bearing as tolerated   Weight-Bearing Status - RLE weight-bearing as tolerated   Cognition   Affect/Mental Status (Cognition) WFL   Follows Commands (Cognition) WFL   Pain Assessment   Patient Currently in Pain Yes, see Vital Sign flowsheet   Integumentary/Edema   Integumentary/Edema no deficits were identifed   Range of Motion (ROM)   Range of Motion ROM is WFL   Strength  (Manual Muscle Testing)   Strength (Manual Muscle Testing) No deficits observed during functional mobility   Bed Mobility   Bed Mobility no deficits identified;sit-supine   Sit-Supine [place] (Bed Mobility) supervision   Transfers   Transfers sit-stand transfer   Sit-Stand Transfer   Sit-Stand [place] (Transfers) supervision   Gait/Stairs (Locomotion)   [place] Level (Gait) supervision   Assistive Device (Gait) walker, front-wheeled   Distance in Feet (Gait) 100ft   Pattern (Gait) step-through   Negotiation (Stairs) stairs independence;handrail location;number of steps   [place] Level (Stairs) supervision   Handrail Location (Stairs) both sides   Number of Steps (Stairs) 4   Balance   Balance Comments Pt needed UE support for standing balance.   Sensory Examination   Sensory Perception patient reports no sensory changes   Clinical Impression   Criteria for Skilled Therapeutic Intervention Yes, treatment indicated   PT Diagnosis (PT) impaired functional mobility   Influenced by the following impairments pain, decrased strength, decreased balance. weakness   Functional limitations due to impairments transfers, gait, stairs   Clinical Presentation (PT Evaluation Complexity) stable   Clinical Presentation Rationale clincal judgement   Clinical Decision Making (Complexity) low complexity   Planned Therapy Interventions (PT) bed mobility training;stair training;gait training   PT Total Evaluation Time   PT Eval, Low Complexity Minutes (59921) 4   Physical Therapy Goals   PT Frequency One time eval and treatment only   PT Predicted Duration/Target Date for Goal Attainment 05/12/25   PT Goals Bed Mobility;Transfers;Gait;Stairs   PT: Bed Mobility Supervision/stand-by assist;Goal Met   PT: Transfers Supervision/stand-by assist;Goal Met   PT: Gait Supervision/stand-by assist;Goal Met   PT: Stairs Supervision/stand-by assist;Goal Met   Gait Training   Gait Training Minutes (75270) 23   Treatment  Detail/Skilled Intervention Pt transferred supine to sit with supervisison. She transferred sit to stand with superivision using RW and cues for hand placement. She ambulated 100ft x2with supervision suing RW with slow pace pace and step through pattern. She did 4 steps with 2HR with supervision using a step pattern. Pt was left sitting in recliner with call light inreach and all needs met.   Distance in Feet 100ft x2   [place] Level (Gait Training) independent   Physical Assistance Level (Gait Training) supervision   Assistive Device (Gait Training) rolling walker   Stair Railings present at both sides   Physical Assist/Nonphysical Assist (Stairs) supervision   Level of [place] (Stairs) stand-by assist   PT Discharge Planning   PT Plan DC PT   PT Discharge Recommendation (DC Rec) home with assist   PT Rationale for DC Rec Pt is near baseline and is mobilizing with supervision using RW. She lives with her . She is safe to DC home and has all the equipment she needs.   PT Brief overview of current status Supervision   PT Total Distance Amb During Session (feet) 200   Physical Therapy Time and Intention   Timed Code Treatment Minutes 23   Total Session Time (sum of timed and untimed services) 27   [facility]                                                                                   OUTPATIENT PHYSICAL THERAPY    PLAN OF TREATMENT FOR OUTPATIENT REHABILITATION   Patient's Last Name, First Name, Tessa Archer YOB: 1935   Provider's Name   [facility]   Medical Record No.  [id number]     Onset Date: 05/07/25 Start of Care Date:       Medical Diagnosis:                  PT Diagnosis:  impaired functional mobility Certification Dates:  From:    To:         See note for plan of treatment, functional goals, and certification details.    I CERTIFY THE NEED FOR THESE SERVICES FURNISHED UNDER        THIS PLAN OF  TREATMENT AND WHILE UNDER MY CARE (Physician co-signature of this document indicates review and certification of the therapy plan).

## 2025-05-08 NOTE — CONSULTS
Care Management Initial Consult    General Information  Assessment completed with: Patient,    Type of CM/SW Visit: Initial Assessment    Primary Care Provider verified and updated as needed: Yes   Readmission within the last 30 days: no previous admission in last 30 days      Reason for Consult: discharge planning  Advance Care Planning: Advance Care Planning Reviewed: no concerns identified          Communication Assessment  Patient's communication style: spoken language (English or Bilingual)    Hearing Difficulty or Deaf: yes   Wear Glasses or Blind: yes    Cognitive  Cognitive/Neuro/Behavioral: WDL                      Living Environment:   People in home: spouse     Current living Arrangements: house      Able to return to prior arrangements: yes       Family/Social Support:  Care provided by: self  Provides care for: no one  Marital Status:   Support system:   [place]       Description of Support System: Supportive    Support Assessment: Adequate family and caregiver support    Current Resources:   Patient receiving home care services: No        Community Resources: None  Equipment currently used at home: cane, straight, walker, rolling, shower chair, wheelchair, manual  Supplies currently used at home:      Employment/Financial:  Employment Status:          Financial Concerns:             Does the patient's insurance plan have a 3 day qualifying hospital stay waiver?  No    Lifestyle & Psychosocial Needs:  Social Drivers of Health     Food Insecurity: Low Risk  (5/8/2025)    Food Insecurity     Within the past 12 months, did you worry that your food would run out before you got money to buy more?: No     Within the past 12 months, did the food you bought just not last and you didn t have money to get more?: No   Depression: Not at risk (2/7/2025)    PHQ-2     PHQ-2 Score: 0   Housing Stability: Low Risk  (5/8/2025)    Housing Stability     Do you have housing? : Yes     Are you worried about  losing your housing?: No   Recent Concern: Housing Stability - High Risk (4/2/2025)    Housing Stability     Do you have housing? : No     Are you worried about losing your housing?: No   Tobacco Use: Medium Risk (5/7/2025)    Patient History     Smoking Tobacco Use: Former     Smokeless Tobacco Use: Never     Passive Exposure: Not on file   Financial Resource Strain: Low Risk  (5/8/2025)    Financial Resource Strain     Within the past 12 months, have you or your family members you live with been unable to get utilities (heat, electricity) when it was really needed?: No   Alcohol Use: Not on file   Transportation Needs: Low Risk  (5/8/2025)    Transportation Needs     Within the past 12 months, has lack of transportation kept you from medical appointments, getting your medicines, non-medical meetings or appointments, work, or from getting things that you need?: No   Physical Activity: Not on file   Interpersonal Safety: Low Risk  (5/8/2025)    Interpersonal Safety     Do you feel physically and emotionally safe where you currently live?: Yes     Within the past 12 months, have you been hit, slapped, kicked or otherwise physically hurt by someone?: No     Within the past 12 months, have you been humiliated or emotionally abused in other ways by your partner or ex-partner?: No   Stress: Not on file   Social Connections: Not on file   Health Literacy: Not on file       Functional Status:  Prior to admission patient needed assistance:   Dependent ADLs:: Ambulation-walker  Dependent IADLs:: Independent  Assesssment of Functional Status: Not at baseline with mobility    Mental Health Status:  Mental Health Status: No Current Concerns       Chemical Dependency Status:  Chemical Dependency Status: No Current Concerns             Values/Beliefs:  Spiritual, Cultural Beliefs, Anglican Practices, Values that affect care: yes          Values/Beliefs Comment: Judaism    Discussed  Partnership in Safe Discharge Planning   document with patient/family: No    Additional Information:  Tessa Mansfield is a 89 year old woman admitted on 5/7/2025. 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 Copied from 5/8/2025 Medicine progress note.    Completed CMA with patient. No home care needs indicated at this time. RNCC will sign off. Please re-consult CM for any further discharge needs.       Next Steps:   Discharge    Nuzhat Bajwa RN, BSN  Care Coordinator, [facility] Orth[facility]  Phone [phone]

## 2025-05-08 NOTE — PROGRESS NOTES
[facility]    Medicine Progress Note - Hospitalist Service, GOLD TEAM 18    Date of Admission:  5/7/2025    Assessment & Plan   Tessa Mansfield is a 89 year old woman admitted on 5/7/2025. 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     #) Left hip pain - The patient presents with left hip pain that came on today after a shower.  XR and CT scan are negative for fracture.  There was discomfort with external rotation of left hip today.  - Oxycodone, tylenol PRN pain  - Obtaining MRI hip  - Consult PT to walk and assess mobility in the am     #) Hypertension  - Continue home amlodipine, losartan, metoprolol     #) Atrial fibrillation  - Continue home dabigatran  - Continue metoprolol     #) Personal history of DVT  - Patient on dabigatran.     #) CAD with past stents - The patient notes twenty stents in total, none more recent than twelve years ago.  - Patient asymptomatic. Monitoring for changes  - Continue home plavix, isosorbide     #) Chronic heart failure with preserved ejection fraction  - Continue home furosemide  - Monitoring for acute heart failure.     #) Chronic kidney disease stage IV  - Continue home sodium bicarbonate.  - Rechecking BMP tomorrow.       Observation Goals: - Seen by PT, able to ambulate, Nurse to notify provider when observation goals have been met and patient is ready for discharge.  Diet: Regular Diet Adult    Bonner Catheter: Not present  Lines: None     Cardiac Monitoring: None  Code Status: Full Code      Clinically Significant Risk Factors Present on Admission                # Drug Induced Coagulation Defect: home medication list includes an anticoagulant medication  # Drug Induced Platelet Defect: home medication list includes an antiplatelet medication   #  Hypertension: Noted on problem list  # Chronic heart failure with preserved ejection fraction: heart failure noted on problem list and last echo with EF >50%              # Financial/Environmental Concerns:     # Pacemaker present       Social Drivers of Health    Housing Stability: Low Risk  (5/8/2025)    Housing Stability     Do you have housing? : Yes     Are you worried about losing your housing?: No   Recent Concern: Housing Stability - High Risk (4/2/2025)    Housing Stability     Do you have housing? : No     Are you worried about losing your housing?: No   Tobacco Use: Medium Risk (5/7/2025)    Patient History     Smoking Tobacco Use: Former     Smokeless Tobacco Use: Never          Disposition Plan     Medically Ready for Discharge: Anticipated Tomorrow             Emeka Nieto MD  Hospitalist Service, GOLD TEAM 18  [facility]  Securely message with [email or web address] (more info)  Text page via [employer] Paging/Directory   See signed in provider for up to date coverage information  ______________________________________________________________________    Interval History   Patient noted pain in very low left lower quadrant with radiation to the back.     Physical Exam   Vital Signs: Temp: 97.6  F (36.4  C) Temp src: Oral BP: (!) 156/76 Pulse: 62   Resp: 16 SpO2: 97 % O2 Device: None (Room air)      General: Patient comfortable, NAD.  Abdomen: Discomfort with palpation of left lower quadrant but no guarding.   Musculoskeletal: Pain with external rotation of left hip.

## 2025-05-09 VITALS
HEART RATE: 56 BPM | RESPIRATION RATE: 16 BRPM | SYSTOLIC BLOOD PRESSURE: 119 MMHG | DIASTOLIC BLOOD PRESSURE: 56 MMHG | TEMPERATURE: 97.5 F | OXYGEN SATURATION: 97 %

## 2025-05-09 LAB
ANION GAP SERPL CALCULATED.3IONS-SCNC: 8 MMOL/L (ref 7–15)
BUN SERPL-MCNC: 27.1 MG/DL (ref 8–23)
CALCIUM SERPL-MCNC: 8.6 MG/DL (ref 8.8–10.4)
CHLORIDE SERPL-SCNC: 104 MMOL/L (ref 98–107)
CREAT SERPL-MCNC: 2.05 MG/DL (ref 0.51–0.95)
EGFRCR SERPLBLD CKD-EPI 2021: 23 ML/MIN/1.73M2
GLUCOSE SERPL-MCNC: 89 MG/DL (ref 70–99)
HCO3 SERPL-SCNC: 27 MMOL/L (ref 22–29)
HOLD SPECIMEN: NORMAL
POTASSIUM SERPL-SCNC: 3.8 MMOL/L (ref 3.4–5.3)
SODIUM SERPL-SCNC: 139 MMOL/L (ref 135–145)

## 2025-05-09 PROCEDURE — 80048 BASIC METABOLIC PNL TOTAL CA: CPT | Performed by: INTERNAL MEDICINE

## 2025-05-09 PROCEDURE — 250N000013 HC RX MED GY IP 250 OP 250 PS 637: Performed by: INTERNAL MEDICINE

## 2025-05-09 PROCEDURE — 250N000011 HC RX IP 250 OP 636: Performed by: NURSE PRACTITIONER

## 2025-05-09 PROCEDURE — 250N000013 HC RX MED GY IP 250 OP 250 PS 637: Performed by: HOSPITALIST

## 2025-05-09 PROCEDURE — 99238 HOSP IP/OBS DSCHRG MGMT 30/<: CPT | Performed by: INTERNAL MEDICINE

## 2025-05-09 PROCEDURE — 250N000013 HC RX MED GY IP 250 OP 250 PS 637: Performed by: NURSE PRACTITIONER

## 2025-05-09 PROCEDURE — 36415 COLL VENOUS BLD VENIPUNCTURE: CPT | Performed by: INTERNAL MEDICINE

## 2025-05-09 PROCEDURE — G0378 HOSPITAL OBSERVATION PER HR: HCPCS

## 2025-05-09 RX ORDER — ACETAMINOPHEN 325 MG/1
975 TABLET ORAL EVERY 8 HOURS PRN
Qty: 90 TABLET | Refills: 0 | Status: SHIPPED | OUTPATIENT
Start: 2025-05-09

## 2025-05-09 RX ORDER — OXYCODONE HYDROCHLORIDE 5 MG/1
2.5-5 TABLET ORAL EVERY 4 HOURS PRN
Qty: 12 TABLET | Refills: 0 | Status: SHIPPED | OUTPATIENT
Start: 2025-05-09

## 2025-05-09 RX ADMIN — POTASSIUM CHLORIDE 20 MEQ: 750 CAPSULE, EXTENDED RELEASE ORAL at 09:20

## 2025-05-09 RX ADMIN — PANTOPRAZOLE SODIUM 40 MG: 40 TABLET, DELAYED RELEASE ORAL at 09:20

## 2025-05-09 RX ADMIN — FUROSEMIDE 40 MG: 20 TABLET ORAL at 09:19

## 2025-05-09 RX ADMIN — ISOSORBIDE MONONITRATE 120 MG: 60 TABLET, EXTENDED RELEASE ORAL at 09:20

## 2025-05-09 RX ADMIN — AMLODIPINE BESYLATE 5 MG: 5 TABLET ORAL at 09:20

## 2025-05-09 RX ADMIN — ACETAMINOPHEN 975 MG: 325 TABLET ORAL at 09:19

## 2025-05-09 RX ADMIN — METHOCARBAMOL 500 MG: 500 TABLET ORAL at 04:31

## 2025-05-09 RX ADMIN — ONDANSETRON 4 MG: 4 TABLET, ORALLY DISINTEGRATING ORAL at 08:47

## 2025-05-09 RX ADMIN — CLOPIDOGREL BISULFATE 75 MG: 75 TABLET, FILM COATED ORAL at 09:20

## 2025-05-09 RX ADMIN — SODIUM BICARBONATE 650 MG TABLET 650 MG: at 09:19

## 2025-05-09 RX ADMIN — METOPROLOL TARTRATE 125 MG: 100 TABLET, FILM COATED ORAL at 09:19

## 2025-05-09 RX ADMIN — DABIGATRAN ETEXILATE MESYLATE 75 MG: 75 CAPSULE ORAL at 09:20

## 2025-05-09 RX ADMIN — TIMOLOL MALEATE 1 DROP: 5 SOLUTION/ DROPS OPHTHALMIC at 09:20

## 2025-05-09 RX ADMIN — LOSARTAN POTASSIUM 25 MG: 25 TABLET, FILM COATED ORAL at 09:19

## 2025-05-09 ASSESSMENT — ACTIVITIES OF DAILY LIVING (ADL)
ADLS_ACUITY_SCORE: 52
ADLS_ACUITY_SCORE: 49
ADLS_ACUITY_SCORE: 52

## 2025-05-09 NOTE — PROGRESS NOTES
Care Management Discharge Note    Discharge Date: 05/08/2025       Discharge Disposition: Home    Discharge Services: None    Discharge DME: None    Discharge Transportation: family or friend will provide    Private pay costs discussed: Not applicable    Does the patient's insurance plan have a 3 day qualifying hospital stay waiver?  No    PAS Confirmation Code:    Patient/family educated on Medicare website which has current facility and service quality ratings:      Education Provided on the Discharge Plan: Yes  Persons Notified of Discharge Plans: patient  Patient/Family in Agreement with the Plan: yes    Handoff Referral Completed: Yes, [facility] PCP: Internal handoff referral completed    Additional Information:  Met with patient at bedside to complete discharge discussion. No home care needs indicated. Patient is anxious to get home. [place] and then she is hoping to discharge. No further discharge concerns at this time. RNCC will sign off. Please re-consult CM for any further discharge needs.    Nuzhat Bajwa RN, BSN  Care Coordinator, [facility]  Phone [phone]

## 2025-05-09 NOTE — PLAN OF CARE
Goal Outcome Evaluation:      Plan of Care Reviewed With: patient    Overall Patient Progress: improving     VS: /64 (BP Location: Right arm)   Pulse 62   Temp 97.5  F (36.4  C) (Oral)   Resp 16   SpO2 98%      O2: On room air   Output: Voids without difficulty and adequately in the toilet,    Last BM: 05/09/25. Passing gas.    Activity: Up with assist of 1 gait belt and walker.    Skin: Bruising to BUE    Pain: Mild pain managed with PRN.   CMS: A&O x4. CMS & neuro intact to baseline.    Dressing: None    Diet: Regular    LDA: L PIV SL   Equipment: Gait belt, walker and personal belongings.    Plan: TBD. Plan of care on going.    Additional Info: Patient will have MRI in [place].   MRI checklist done.

## 2025-05-09 NOTE — PLAN OF CARE
Goal Outcome Evaluation:   Pt AOx4, planning to discharge home this afternoon w/ orders for outpatient followup for BMP and MRI     /56 (BP Location: Right arm)   Pulse 56   Temp 97.5  F (36.4  C) (Oral)   Resp 16   SpO2 97%   O2>90% ORA, denies feeling SOB, lightheadedness. Lungs clear, equal bilateral    Output: Voids spontaneously, denies difficulties    Activity: Ax1 , w/ walker and GB   Skin: Scattered bruising to BUE    Pain: Minimal pain 1/10 reported by pt    CMS: CMS intact, denies N&T   Diet: Reg, intermittent nausea, antiemetic given w/ reproted relief of symptoms    LDA: PIV removed by author prior to discharge   Plan: Plan to go home this afternoon        DISCHARGE SUMMARY    Pt discharging to: home  Transportation: Family  AVS given and discussed: Pt was given AVS and pt states understanding of content. Pt has no further questions.   Stoplight Tool given and discussed: Yes, no further questions.  Medications given: NA no meds given, hard script for oxy given to pt per providers orders  Belongings returned: Yes, ensured all belongings packed and sent with pt. No items in security.   Comments: Escorted safely to elevators. Pt left at 1350

## 2025-05-09 NOTE — DISCHARGE SUMMARY
[facility]  Hospitalist Discharge Summary      Date of Admission:  07-May-2025  Date of Discharge:   09-May-2025  Discharging Provider: Emeka Nieto MD  Discharge Service: Hospitalist Service, GOLD TEAM 18    Discharge Diagnoses   1.) Left hip pain   2.) Hypertension   3.) Atrial fibrillation   4.) Personal history of DVT   5.) Coronary artery disease with past stents  6.) Chronic heart failure with preserved ejection fraction   7.) Chronic kidney disease stage IV     Clinically Significant Risk Factors          Follow-ups Needed After Discharge   Follow-up Appointments       Hospital Follow-up with Existing Primary Care Provider (PCP)          Schedule Primary Care visit within: 7 Days               Unresulted Labs Ordered in the Past 30 Days of this Admission       No orders found from 4/7/2025 to 5/8/2025.            Discharge Disposition   - Patient was discharged to home in good condition.     Hospital Course   Tessa Mansfield is a 89 year old woman admitted on 5/7/2025. 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     #) Left hip pain - The patient presented with left hip pain that came on after a shower.  X-ray was negative for fracture.  There was discomfort with external rotation of left hip yesterday. Pain is much improved today.  - Oxycodone, tylenol PRN pain  - MRI left hip was considered but as symptoms have improved, will defer MRI.     #) Hypertension  - Continue home amlodipine, losartan, metoprolol     #) Atrial fibrillation  - Continue home dabigatran  - Continue metoprolol     #) Personal history of DVT  - Patient on dabigatran.      #) CAD with past stents - The patient notes twenty stents in total, none more recent than twelve years ago.  - Patient asymptomatic during  hospital stay,  - Continue home plavix, isosorbide     #) Chronic heart failure with preserved ejection fraction  - Continue home furosemide  - Patient had no evidence of acute heart failure during hospital stay.     #) Chronic kidney disease stage IV  - Continue home sodium bicarbonate.  - From chart review, baseline creatinine 1.9-2.2 per Nephrology note dated 17-Feb-2025. Creatinine was in baseline range on 09-May-2025.   - Given recent IV contrast administration, BMP to be rechecked on 12-May-2025.       Consultations This Hospital Stay   NURSING TO CONSULT FOR VASCULAR ACCESS CARE IP CONSULT  PHYSICAL THERAPY ADULT IP CONSULT  CARE MANAGEMENT / SOCIAL WORK IP CONSULT  CARE MANAGEMENT / SOCIAL WORK IP CONSULT    Code Status   Prior      Emeka Nieto MD  [facility]THOPEDIC  [address] [place] [id number]  Phone: [phone]  Fax: [phone]  ______________________________________________________________________    Physical Exam   Vital Signs: Temp: 97.5  F (36.4  C) Temp src: Oral BP: 119/56 Pulse: 56   Resp: 16 SpO2: 97 % O2 Device: None (Room air)      General: Patient comfortable, NAD.        Primary Care Physician   Pedro Gomez    Discharge Orders      Basic metabolic panel  (Ca, Cl, CO2, Creat, Gluc, K, Na, BUN)     Primary Care - Care Coordination Referral      Reason for your hospital stay    Left hip pain     Activity    Your activity upon discharge: As tolerated     Diet    Follow this diet upon discharge: Regular diet.     Hospital Follow-up with Existing Primary Care Provider (PCP)            Significant Results and Procedures   - None    Discharge Medications   Discharge Medication List as of 5/9/2025 12:32 PM        START taking these medications    Details   oxyCODONE (ROXICODONE) 5 MG tablet Take 0.5-1 tablets (2.5-5 mg) by mouth every 4 hours as needed for moderate to severe pain (IF pain not managed with non-pharmacological and non-opioid  interventions). Oxycodone 2.5 mg every 4 hours as needed for moderate pain. Oxycodone 5 mg every 4 kathrine rs as needed for severe pain., Disp-12 tablet, R-0, Local Print           CONTINUE these medications which have CHANGED    Details   acetaminophen (TYLENOL) 325 MG tablet Take 3 tablets (975 mg) by mouth every 8 hours as needed for mild pain., Disp-90 tablet, R-0, E-Prescribe           CONTINUE these medications which have NOT CHANGED    Details   allopurinol (ZYLOPRIM) 100 MG tablet TAKE 1 TABLET BY MOUTH ONCE DAILY *NEEDS TO HAVE LABS FOR FURTHER REFILLS*, Disp-90 tablet, R-0, E-Prescribe      amLODIPine (NORVASC) 5 MG tablet Take 1 tablet (5 mg) by mouth daily., Disp-90 tablet, R-3, E-Prescribe      clopidogrel (PLAVIX) 75 MG tablet Take 1 tablet (75 mg) by mouth daily., Disp-90 tablet, R-3, E-Prescribe      dabigatran ANTICOAGULANT (PRADAXA) 75 MG capsule Take 1 capsule (75 mg) by mouth 2 times daily. Store in original 's bottle or blister pack; use within 120 days of opening., Disp-60 capsule, R-0, E-Prescribe      furosemide (LASIX) 40 MG tablet Take 1 tablet (40 mg) by mouth daily May take an additional 20 mg at noon as needed for swelling., Disp-90 tablet, R-2, E-PrescribePlease take an additional 20 mgs for 3 days at noon. After that, may take additional 20 mg at noon as needed for swelling      isosorbide mononitrate (IMDUR) 30 MG 24 hr tablet Take 4 tablets (120 mg) by mouth daily., Transitional      lidocaine (LIDODERM) 5 % patch Place 1 patch over 12 hours onto the skin every 24 hours. To prevent lidocaine toxicity, patient should be patch free for 12 hrs daily.Disp-10 patch, [id number]      losartan (COZAAR) 25 MG tablet Take 1 tablet (25 mg) by mouth 2 times daily., Disp-180 tablet, R-3, E-Prescribe      methocarbamol (ROBAXIN) 500 MG tablet Take 1 tablet (500 mg) by mouth every 6 hours as needed for muscle spasms., Disp-30 tablet, R-5, E-PrescribePlease let the patient know when it  is ready to . Thank you.      metoprolol tartrate (LOPRESSOR) 25 MG tablet Take 5 tablets (125 mg) by mouth 2 times daily., No Print Out      Multiple Vitamins-Minerals (PRESERVISION AREDS 2+MULTI VIT PO) Take 1 capsule by mouth 2 times daily, Historical      omeprazole (PRILOSEC) 40 MG DR capsule Take 1 capsule (40 mg) by mouth daily., Disp-90 capsule, R-3, E-Prescribe      potassium chloride ER (K-TAB) 20 MEQ CR tablet Take 1 tablet (20 mEq) by mouth daily, Disp-90 tablet, R-3, E-Prescribe      psyllium (METAMUCIL/KONSYL) Packet Take 1 packet by mouth daily as needed for constipation., Historical      rosuvastatin (CRESTOR) 20 MG tablet Take 1 tablet by mouth once daily, Disp-90 tablet, R-0, E-Prescribe      sodium bicarbonate 650 MG tablet Take 1 tablet (650 mg) by mouth 2 times daily, Disp-60 tablet, R-11, E-Prescribe      timolol maleate (TIMOPTIC) 0.5 % ophthalmic solution INSTILL 1 DROP INTO EACH EYE ONCE DAILY IN THE MORNING, Historical      vitamin B-12 (CYANOCOBALAMIN) 500 MCG tablet Take 1 tablet by mouth daily, Historical      vitamin D3 25 mcg (1000 units) tablet Take 1 tablet (25 mcg) by mouth every other day, Disp-90 tablet, R-3, E-Prescribe           STOP taking these medications       traMADol (ULTRAM) 50 MG tablet Comments:   Reason for Stopping:             Allergies   Allergies   Allergen Reactions    Shrimp Swelling    Codeine Sulfate Itching    Indomethacin      Other Reaction(s): Not available    Levofloxacin Hemihydrate      Other Reaction(s): Not available    Morphine Sulfate      Other Reaction(s): Not available    Oxycodone Other (See Comments)    Shrimp Extract Swelling    Sulfamethoxazole W/Trimethoprim      Other Reaction(s): Not available    Chlorthalidone Nausea and Vomiting     Other Reaction(s): Not available    Clarithromycin      Other Reaction(s): Not available    Clonidine      Other Reaction(s): Not available    Darvon [Propoxyphene Hcl]     Gabapentin Confusion and  "Fatigue     \"felt drunk\"    Other Reaction(s): Not available    Hydromorphone Hallucination and Visual Disturbance     Tolerated in April 2024 hospital admissions    Other Reaction(s): Not available    Indomethacin     Percocet [Oxycodone-Acetaminophen] Hallucination    Pregabalin Fatigue and Itching    Simvastatin Palpitations and Cramps     Muscle weakness, leg cramping    Spironolactone      Dehydrated    Other Reaction(s): Not available    Terazosin      Other Reaction(s): Not available     "

## 2025-05-09 NOTE — PLAN OF CARE
VS: VSS, pt denied CP or SOB.   O2: Room air sat. > 90%.   Output: Voiding adequate amount in the bathroom.    Last BM: 05/06/25 passing gas.    Activity: Up with walker, transfer belt and assist of one.    Skin: Intact except some bruises on BUE.    Pain: mild pain medicated for pain PRN.    Neuro: CMS and neuro intact to baseline.   Dressing: None.   Diet: Regular tolerating okay.    LDA: NINA MCDERMOTT.   Equipment: Walker and personal belongings.    Plan: TBD.    Additional Info:

## 2025-05-10 DIAGNOSIS — I50.22 CHRONIC SYSTOLIC HEART FAILURE (H): ICD-10-CM

## 2025-05-10 DIAGNOSIS — M10.00 IDIOPATHIC GOUT, UNSPECIFIED CHRONICITY, UNSPECIFIED SITE: ICD-10-CM

## 2025-05-10 DIAGNOSIS — I25.118 CORONARY ARTERY DISEASE OF NATIVE ARTERY OF NATIVE HEART WITH STABLE ANGINA PECTORIS: ICD-10-CM

## 2025-05-10 RX ORDER — ROSUVASTATIN CALCIUM 20 MG/1
20 TABLET, COATED ORAL DAILY
Qty: 90 TABLET | Refills: 0 | Status: SHIPPED | OUTPATIENT
Start: 2025-05-10

## 2025-05-10 NOTE — TELEPHONE ENCOUNTER
Rosuvastatin 20 mg tablet - 1 tablet PO daily  Last Clinic Visit: 05/07/2025    Allopurinol 100 mg tablet - 1 tablet po daily        Last Written Prescription Date:  02/04/2025  Last Fill Quantity: 90,   # refills: 0  Last Office Visit : 05/07/2025  Future Office visit:  No future appointment with FP scheduled    Routing refill request to provider for review/approval because:   Has GFR on file in past 12 months and most recent value is normal      Latest Reference Range & Units 05/07/25 14:10 05/09/25 06:09   GFR Estimate >60 mL/min/1.73m2 32 (L) 23 (L)   (L): Data is abnormally low    Future lab appointment for 07/28/2025    Patient admitted to hospital  Hospitalist Discharge Summary       Date of Admission:  07-May-2025  Date of Discharge:   09-May-2025    Allopurinol medication not changed/adjusted during hospitalization    Signing order for rosuvastatin refill - forwarding allopurinol on for MD review

## 2025-05-12 DIAGNOSIS — Z09 HOSPITAL DISCHARGE FOLLOW-UP: ICD-10-CM

## 2025-05-13 ENCOUNTER — PATIENT OUTREACH (OUTPATIENT)
Dept: CARE COORDINATION | Facility: CLINIC | Age: 89
End: 2025-05-13
Payer: COMMERCIAL

## 2025-05-13 RX ORDER — ALLOPURINOL 100 MG/1
TABLET ORAL
Qty: 90 TABLET | Refills: 0 | Status: SHIPPED | OUTPATIENT
Start: 2025-05-13

## 2025-05-13 NOTE — TELEPHONE ENCOUNTER
Requested Renewals       Name from pharmacy: Allopurinol 100 MG Oral Tablet         Will file in chart as: allopurinol (ZYLOPRIM) 100 MG tablet    Sig: TAKE 1 TABLET BY MOUTH ONCE DAILY *NEEDS TO HAVE LABS FOR FURTHER REFILLS*    Disp: 90 tablet    Refills: 0    Start: 5/10/2025    Class: E-Prescribe    Non-formulary For: Idiopathic gout, unspecified chronicity, unspecified site    Last ordered: 3 months ago (2/4/2025) by Pedro Gomez MD    Last refill: 11/12/2024    Rx #: [id number]    Gout Agents Protocol Ojnles3705/13/2025 10:07 AM   Protocol Details Has GFR on file in past 12 months and most recent value is normal    CBC on file in past 12 months    ALT on file in past 12 months    Has Uric Acid on file in past 12 months and value is less than 6    Medication is active on med list and the sig matches. RN to manually verify dose and sig if red X/fail.    Medication indicated for associated diagnosis    Recent (12 mo) or future (90 days) visit within the authorizing provider's specialty    Patient is age 18 or older    No active pregnancy on record    No positive pregnancy test in past year      To be filled at: [facility] [address] [place], [place] [address] [address] [place]

## 2025-05-13 NOTE — TELEPHONE ENCOUNTER
Last Visit Date: 2/7/2025 [facility]  Future Visit Date: 5/23/2025  ----------------------  Medication Requested: furosemide (LASIX) 40 MG tablet  Last Written Prescription: 6/6/2024 Disp:90 R:2  ---------------------  [x]  Refill decision: Medication unable to be refilled by RN due to: Diuretics (Including Combos) Protocol Failed     []    Pt not seen within past 12 months;  No FOV;  or FOV exceeds timeframe per protocol requirements  []    Compliance - lapse in therapy/gap in refills; No Shows; Cancellations  []    Verification - order discrepancy, clarification needed, Sig modification needed  []    Controlled medication  []    Medication not included in refill protocol policy  [x]    Abnormal labs/test: GFR (L), Creatinine (H)  []    Overdue labs/test:    []    Medication not active on Pt's med list  []    Drug interaction Warning  []    Medication - Last script is Reported/Historical/Transitional  []    Advanced refill request  []    Review Needed: New med; Med adjusted within <= 30 days; Safety Alert; Lab monitoring required  []    Other:   Creatinine   Date Value Ref Range Status   05/09/2025 2.05 (H) 0.51 - 0.95 mg/dL Final   08/26/2020 1.70 (H) 0.52 - 1.04 mg/dL Final     GFR Estimate   Date Value Ref Range Status   05/09/2025 23 (L) >60 mL/min/1.73m2 Final     Comment:     eGFR calculated using 2021 CKD-EPI equation.   08/26/2020 27 (L) >60 mL/min/[1.73_m2] Final     Comment:     Non  GFR Calc  Starting 12/18/2018, serum creatinine based estimated GFR (eGFR) will be   calculated using the Chronic Kidney Disease Epidemiology Collaboration   (CKD-EPI) equation.         Request from pharmacy:  Requested Prescriptions   Pending Prescriptions Disp Refills    furosemide (LASIX) 40 MG tablet [Pharmacy Med Name: Furosemide 40 MG Oral Tablet] 90 tablet 0     Sig: TAKE 1 TABLET BY MOUTH ONCE DAILY *MAY TAKE AN ADDITIONAL 20 MG AT NOON AS NEEDED FOR SWELLING*       Diuretics  (Including Combos) Protocol Failed - 5/13/2025 10:23 AM        Failed - Medication is active on med list and the sig matches. RN to manually verify dose and sig if red X/fail.     If the protocol passes (green check), you do not need to verify med dose and sig.    A prescription matches if they are the same clinical intention.    For Example: once daily and every morning are the same.    The protocol can not identify upper and lower case letters as matching and will fail.     For Example: Take 1 tablet (50 mg) by mouth daily     TAKE 1 TABLET (50 MG) BY MOUTH DAILY    For all fails (red x), verify dose and sig.    If the refill does match what is on file, the RN can still proceed to approve the refill request.       If they do not match, route to the appropriate provider.             Failed - Has GFR on file in past 12 months and most recent value is normal        Passed - Most recent blood pressure under 140/90 in past 12 months     BP Readings from Last 3 Encounters:   05/09/25 119/56   05/07/25 (!) 179/80   04/30/25 (!) 151/74       No data recorded            Passed - Potassium level on file in past 12 months        Passed - Medication indicated for associated diagnosis     Medication is associated with one or more of the following diagnoses:     Edema   Hypertension   Hypercalcemia   Heart Failure   Chronic Kidney Disease (CKD)   Cardiomyopathy   Dyspnea   Chronic Thromboembolic Pulmonary Hypertension   Pulmonary Hypertension          Passed - Recent (12 mo) or future (90 days) visit within the authorizing provider's specialty     The patient must have completed an in-person or virtual visit within the past 12 months or has a future visit scheduled within the next 90 days with the authorizing provider s specialty.  Urgent care and e-visits do not qualify as an office visit for this protocol.          Passed - Patient is age 18 or older        Passed - No active pregancy on record        Passed - No positive  pregnancy test in past 12 months

## 2025-05-13 NOTE — PROGRESS NOTES
Clinic Care Coordination Contact  Transitions of Care Outreach    Chief Complaint   Patient presents with    Clinic Care Coordination - Post Hospital       Most Recent Admission Date: 5/7/2025   Most Recent Admission Diagnosis: Hip pain, left - M25.552  Ambulatory dysfunction - R26.2     Most Recent Discharge Date: 5/9/2025   Most Recent Discharge Diagnosis: Hip pain, left - M25.552  Ambulatory dysfunction - R26.2  Chronic kidney disease, stage IV (severe) (H) - N18.4     Transitions of Care Assessment    Discharge Assessment  How are you doing now that you are home?: RN called and spoke with patient. Pt shares doing well- in a lot of pain, but the pain medication that was prescribed helps. Reviewed upcoming appts, pt has no concerns. Pt declined CC.  How are your symptoms? (Red Flag symptoms escalate to triage hotline per guidelines): Improved  Do you know how to contact your clinic care team if you have future questions or changes to your health status? : Yes  Does the patient have their discharge instructions? : Yes  Does the patient have questions regarding their discharge instructions? : No  Were you started on any new medications or were there changes to any of your previous medications? : Yes  Does the patient have all of their medications?: Yes  Do you have questions regarding any of your medications? : No  Do you have all of your needed medical supplies or equipment (DME)?  (i.e. oxygen tank, CPAP, cane, etc.): Yes              Follow up Plan     Discharge Follow-Up  Discharge follow up appointment scheduled in alignment with recommended follow up timeframe or Transitions of Risk Category? (Low = within 30 days; Moderate= within 14 days; High= within 7 days): Yes  Discharge Follow Up Appointment Date: 05/15/25  Discharge Follow Up Appointment Scheduled with?: Primary Care Provider (Dr. Reyez as PCP isn't available)    Future Appointments   Date Time Provider Department Center   5/15/2025  9:40 AM Aissatou  Yaa Burton MD [facility]   5/23/2025  2:00 PM Maria Fernanda Funk PA-C [facility]   7/7/2025  1:30 PM Marzena Martin MD [facility]   7/28/2025  8:00 AM  LAB UCLABR [facility]   7/28/2025  8:30 AM  CV DEVICE 1 UCCVCV [facility]   7/28/2025  9:00 AM [id number] UCCVCV [facility]   7/28/2025 10:20 AM Randa Ann NP [facility]   10/29/2025 12:00 AM  ICD REMOTE CVSV [facility]       Outpatient Plan as outlined on AVS reviewed with patient.      For any urgent concerns, please contact our 24 hour nurse triage line: [phone]       LUPE CALLE RN

## 2025-05-14 ENCOUNTER — TELEPHONE (OUTPATIENT)
Dept: FAMILY MEDICINE | Facility: CLINIC | Age: 89
End: 2025-05-14
Payer: COMMERCIAL

## 2025-05-14 NOTE — TELEPHONE ENCOUNTER
MTM referral from: Transitions of Care (recent hospital discharge, TCU discharge, or ED visit)    MTM referral outreach attempt #2 on May 14, 2025 at 10:42 AM      Outcome: Patient not reachable after several attempts, sent [employer] message    Use [facility] part d MAP for the carrier/Plan on the flowsheet      [employer] Message Sent    Cece Zamora CMA  MTM

## 2025-05-15 ENCOUNTER — OFFICE VISIT (OUTPATIENT)
Dept: INTERNAL MEDICINE | Facility: CLINIC | Age: 89
End: 2025-05-15
Payer: COMMERCIAL

## 2025-05-15 VITALS
OXYGEN SATURATION: 98 % | HEART RATE: 68 BPM | TEMPERATURE: 98.1 F | DIASTOLIC BLOOD PRESSURE: 68 MMHG | RESPIRATION RATE: 14 BRPM | SYSTOLIC BLOOD PRESSURE: 170 MMHG

## 2025-05-15 DIAGNOSIS — M81.0 POST-MENOPAUSAL OSTEOPOROSIS: ICD-10-CM

## 2025-05-15 DIAGNOSIS — I25.118 CORONARY ARTERY DISEASE OF NATIVE ARTERY OF NATIVE HEART WITH STABLE ANGINA PECTORIS: Primary | ICD-10-CM

## 2025-05-15 DIAGNOSIS — Z78.9 COMPLEX MEDICAL CONDITION: ICD-10-CM

## 2025-05-15 DIAGNOSIS — I48.91 ATRIAL FIBRILLATION, UNSPECIFIED TYPE (H): ICD-10-CM

## 2025-05-15 RX ORDER — METOPROLOL TARTRATE 25 MG/1
125 TABLET, FILM COATED ORAL 2 TIMES DAILY
Qty: 600 TABLET | Refills: 3 | Status: SHIPPED | OUTPATIENT
Start: 2025-05-15

## 2025-05-15 RX ORDER — ISOSORBIDE MONONITRATE 30 MG/1
90 TABLET, EXTENDED RELEASE ORAL DAILY
Qty: 270 TABLET | Refills: 3 | Status: SHIPPED | OUTPATIENT
Start: 2025-05-15

## 2025-05-15 ASSESSMENT — PAIN SCALES - GENERAL: PAINLEVEL_OUTOF10: NO PAIN (0)

## 2025-05-15 NOTE — PROGRESS NOTES
Assessment & Plan     Left hip pain  Post-menopausal osteoporosis  Recent admission for L hip pain, no recent trauma. XR without fracture, showed demineralization of the visualized bones. Prior DEXA in 2016 with evidence of osteoporosis, has not started therapies for osteoporosis. Avoid alendronate with history of poor renal function. Pain well-managed and improving.  - DX Bone Density; Future  - Follow-up with Dr. Kendall for management of osteoporosis    Bilateral upper extremity ecchymosis  Has dark purple discoloration in her left upper extremity around her elbow extending to forearm and slight bruising in her right upper extremity since discharge. Is on dabigatran and clopidogrel. Likely due to IV placement/trauma related to this. Bruising is stable, has some pain in her arms where the bruising is. Discussed wearing protective clothing and being mindful to not bump her arms.    Coronary artery disease of native artery of native heart with stable angina pectoris  Patient has been taking 90mg imdur daily, however discharge paperwork stated 120mg daily. Unable to find documentation of reason for this change, and patient has continued to take 90mg daily, so will continue this after discussing with patient.  - isosorbide mononitrate (IMDUR) 30 MG 24 hr tablet; Take 3 tablets (90 mg) by mouth daily.    Atrial fibrillation, unspecified type (H)  - metoprolol tartrate (LOPRESSOR) 25 MG tablet; Take 5 tablets (125 mg) by mouth 2 times daily.    Chronic heart failure with preserved ejection fraction  - Continue home furosemide 40mg daily. Discussed taking an additional 40mg if she notices increased swelling in her lower extremities as she has been doing. Has BMP ordered to follow-up Cr after contrast CT during recent hospitalization, will also assess K level.  - BMP ordered previously, not yet completed    Complex medical condition  - Home Care Referral  - Care Coordination Referral    MED REC REQUIRED  Post Medication  Reconciliation Status:       Follow up with Dr. Kendall in 2-4 weeks for osteoporosis management.    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    No recent trauma to her hip, no recent falls. Hip XR was negative, pain managed with oxycodone. Has been taking 2.5mg of oxycodone for the past week, has decreased how much she has needed, has 3-4 tablets left of the 12 she was discharged with. Has improved pain in her L hip/back. Has documented osteoporosis, not on alendronate and no DEXA.    Has dark purple discoloration in her left upper extremity around her elbow extending to forearm and slight bruising in her right upper extremity since discharge. Is on dabigatran and clopidogrel.    Has been taking imdur 90mg daily. In 4/2025, her Metoprolol tartrate was increased to 125mg bid due to afib with rvr.    Yesterday her legs were swollen, took an extra 40mg furosemide in the afternoon, which helped. Takes an extra furosemide a few times per week in the afternoon if she notices swelling in her legs.    BP today 170/68, has been elevated during prior visits as well. BP on hospital discharge was 119/56.      5/13/2025   Post Discharge Outreach   How are you doing now that you are home? RN called and spoke with patient. Pt shares doing well- in a lot of pain, but the  pain medication that was prescribed helps. Reviewed upcoming appts, pt has no concerns. Pt declined CC.   How are your symptoms? (Red Flag symptoms escalate to triage hotline per guidelines) Improved   Does the patient have their discharge instructions?  Yes   Does the patient have questions regarding their discharge instructions?  No   Were you started on any new medications or were there changes to any of your previous medications?  Yes   Does the patient have all of their medications? Yes   Do you have questions regarding any of your medications?  No   Do you have all of your needed medical supplies or equipment (DME)?  (i.e. oxygen tank, CPAP, cane, etc.) Yes   Discharge Follow Up Appointment Date 5/15/2025   Discharge Follow Up Appointment Scheduled with? Primary Care Provider       Hospital Follow-up Visit:    Hospital/Nursing Home/IP Rehab Facility: [facility]  Most Recent Admission Date: 5/7/2025   Most Recent Admission Diagnosis: Hip pain, left - M25.552  Ambulatory dysfunction - R26.2     Most Recent Discharge Date: 5/9/2025   Most Recent Discharge Diagnosis: Hip pain, left - M25.552  Ambulatory dysfunction - R26.2  Chronic kidney disease, stage IV (severe) (H) - N18.4  Abdominal pain, left lower quadrant - R10.32  Extravasation of vesicant agent - T80.818A  Pacemaker - Z95.0   Was the patient in the ICU or did the patient experience delirium during hospitalization?  No  Do you have any other stressors you would like to discuss with your provider? No    Problems taking medications regularly:  None  Medication changes since discharge: None  Problems adhering to non-medication therapy:  None    Summary of hospitalization:  [facility] discharge summary reviewed  Diagnostic Tests/Treatments reviewed.  Follow up needed: BMP ordered, hasn't been completed  Other Healthcare Providers Involved in Patient s Care:         Physical Therapy  Update since  discharge: improved.         Plan of care communicated with patient               Objective    BP (!) 170/68 (BP Location: Right arm, Patient Position: Sitting, Cuff Size: Adult Regular)   Pulse 68   Temp 98.1  F (36.7  C) (Oral)   Resp 14   SpO2 98%   Breastfeeding No   There is no height or weight on file to calculate BMI.  Physical Exam   GENERAL: alert and no distress  RESP: lungs clear to auscultation - no rales, rhonchi or wheezes  CV: regular rate and rhythm, normal S1 S2, no S3 or S4,1+ bilateral lower extremity swelling  ABDOMEN: soft, nontender, no hepatosplenomegaly, no masses  MS: Has large hematoma in her left upper extremity around her elbow extending to forearm and slight bruising in her right upper extremity         Signed Electronically by: MELVI EAGLE MD

## 2025-05-15 NOTE — PROGRESS NOTES
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    Has been taking half tablets of oxycodone for the past week, has decreased how much she has needed. Has improved pain in her L hip/back.    Has severe bruising in her left upper extremity and slight bruising in her right upper extremity since discharge. Is on dabigatran and clopidogrel.    Has been taking imdur 90mg daily. In 4/2025, her Metoprolol tartrate was increased to 125mg bid due to afib with rvr.    Yesterday her legs were swollen, took an extra 40mg furosemide in the afternoon, which helped. Takes an extra furosemide a few times per week in the afternoon if she notices swelling in her legs.    BP today 170/68, has been elevated during prior visits as well. BP on hospital discharge was 119/56.

## 2025-05-15 NOTE — PROGRESS NOTES
I, Donell Avila MD saw the patient with the resident, and agree with the resident's findings and plan of care as documented in the resident's note.  BP (!) 170/68 (BP Location: Right arm, Patient Position: Sitting, Cuff Size: Adult Regular)   Pulse 68   Temp 98.1  F (36.7  C) (Oral)   Resp 14   SpO2 98%   Breastfeeding No   I personally reviewed vital signs.  Key findings: multiple chronic medical problems, hosp f/u RE hip.

## 2025-05-19 RX ORDER — FUROSEMIDE 40 MG/1
40 TABLET ORAL DAILY
Qty: 90 TABLET | Refills: 3 | Status: SHIPPED | OUTPATIENT
Start: 2025-05-19

## 2025-05-20 ENCOUNTER — DOCUMENTATION ONLY (OUTPATIENT)
Dept: FAMILY MEDICINE | Facility: CLINIC | Age: 89
End: 2025-05-20
Payer: COMMERCIAL

## 2025-05-20 NOTE — PROGRESS NOTES
Type of Form Received: [id number]    Form Received (Date) 5/19/25   Form Filled out No   Placed in provider folder Yes

## 2025-05-21 ENCOUNTER — DOCUMENTATION ONLY (OUTPATIENT)
Dept: FAMILY MEDICINE | Facility: CLINIC | Age: 89
End: 2025-05-21
Payer: COMMERCIAL

## 2025-05-21 DIAGNOSIS — Z53.9 DIAGNOSIS NOT YET DEFINED: Primary | ICD-10-CM

## 2025-05-21 NOTE — PROGRESS NOTES
Type of Form Received: [employer] [id number]    Form Received (Date) 5/20/25   Form Filled out No   Placed in provider folder Yes

## 2025-05-22 ENCOUNTER — DOCUMENTATION ONLY (OUTPATIENT)
Dept: FAMILY MEDICINE | Facility: CLINIC | Age: 89
End: 2025-05-22
Payer: COMMERCIAL

## 2025-05-22 ENCOUNTER — MEDICAL CORRESPONDENCE (OUTPATIENT)
Dept: HEALTH INFORMATION MANAGEMENT | Facility: CLINIC | Age: 89
End: 2025-05-22
Payer: COMMERCIAL

## 2025-05-22 NOTE — PROGRESS NOTES
Type of Form Received: [employer] [id number]    Form Received (Date) 5/21/25   Form Filled out No   Placed in provider folder Yes

## 2025-05-23 ENCOUNTER — MEDICAL CORRESPONDENCE (OUTPATIENT)
Dept: HEALTH INFORMATION MANAGEMENT | Facility: CLINIC | Age: 89
End: 2025-05-23

## 2025-05-27 DIAGNOSIS — I48.0 PAROXYSMAL ATRIAL FIBRILLATION (H): ICD-10-CM

## 2025-05-27 RX ORDER — DABIGATRAN ETEXILATE 75 MG/1
75 CAPSULE ORAL 2 TIMES DAILY
Qty: 60 CAPSULE | Refills: 11 | Status: SHIPPED | OUTPATIENT
Start: 2025-05-27

## 2025-05-27 NOTE — TELEPHONE ENCOUNTER
"Last Written Prescription:     dabigatran ANTICOAGULANT (PRADAXA) 75 MG capsule 60 capsule 0 4/24/2025 -- No   Sig - Route: Take 1 capsule (75 mg) by mouth 2 times daily. Store in original 's bottle or blister pack; use within 120 days of opening.      ----------------------  Last Visit Date: 5/23/25 Josephine GARAY   She is currently taking Pradaxa.   Future Visit Date: One year  ----------------------       Refill decision: Medication unable to be refilled by RN due to: Pt not seen within past 12 months, No FOV or FOV exceeds timeframe per protocol   and Review Needed: New med; Med adjusted within <= 30 days; Safety Alert; Lab monitoring required   Safety alert- previously overridden  \"Additional Information: Purpose is to prevent mix-ups with clopidgrel, hence the limit to 2 doses daily. gh 4/1/2012     The medication is contraindicated. \"        Medication Refill in Ambulatory Care  policy.   Updated Cardiology protocol ANTI-COAGULANTS March 2025> CMP, BMP on file in past 12 months required >*Labs, BP do not need to be normal   CBC RESULTS:   Recent Labs   Lab Test 05/07/25  1410   WBC 11.4*   RBC 3.96   HGB 11.9   HCT 38.4   MCV 97   MCH 30.1   MCHC 31.0*   RDW 17.4*   *    Last Comprehensive Metabolic Panel:  Lab Results   Component Value Date     05/09/2025    POTASSIUM 3.8 05/09/2025    CHLORIDE 104 05/09/2025    CO2 27 05/09/2025    ANIONGAP 8 05/09/2025    GLC 89 05/09/2025    BUN 27.1 (H) 05/09/2025    CR 2.05 (H) 05/09/2025    GFRESTIMATED 23 (L) 05/09/2025    ALEXANDRO 8.6 (L) 05/09/2025          Request from pharmacy:  Requested Prescriptions   Pending Prescriptions Disp Refills         Request from pharmacy:  Requested Prescriptions   Pending Prescriptions Disp Refills    dabigatran ANTICOAGULANT (PRADAXA) 75 MG capsule 60 capsule 11     Sig: Take 1 capsule (75 mg) by mouth 2 times daily. Store in original 's bottle or blister pack; use within 120 days of opening.       " "Thrombin Inhibitor Agents Failed - 5/27/2025  9:46 AM        Failed - Serum creatinine less than or equal to 1.4 on file in past 12 mos     Recent Labs   Lab Test 05/09/25  0609   CR 2.05*                 Passed - Presence of CBC on file in past 12 mos     Recent Labs   Lab Test 05/07/25  1410   WBC 11.4*   RBC 3.96   HGB 11.9   HCT 38.4   *                 Passed - Medication is active on med list and the sig matches. RN to manually verify dose and sig if red X/fail.     If the protocol passes (green check), you do not need to verify med dose and sig.    A prescription matches if they are the same clinical intention.    For Example: once daily and every morning are the same.    The protocol can not identify upper and lower case letters as matching and will fail.     For Example: Take 1 tablet (50 mg) by mouth daily     TAKE 1 TABLET (50 MG) BY MOUTH DAILY    For all fails (red x), verify dose and sig.    If the refill does match what is on file, the RN can still proceed to approve the refill request.       If they do not match, route to the appropriate provider.             Passed - Creatinine Clearance greater than 50 ml/min on file in past 12 mos     No lab results found.          Passed - Recent (6 month) or future (90 days) visit with the authorizing provider's specialty (provided they have been seen in the past 9 months)     Patient had office visit in the last 6 months or has a visit in the next 30 days with authorizing provider or within the authorizing provider's specialty.  See \"Patient Info\" tab in inbasket, or \"Choose Columns\" in Meds & Orders section of the refill encounter.            Passed - Patient is age 18 or older        Passed - No active pregnancy on record        Passed - No positive pregnancy test within past 12 months              "

## 2025-06-02 LAB
ATRIAL RATE - MUSE: NORMAL BPM
DIASTOLIC BLOOD PRESSURE - MUSE: NORMAL MMHG
INTERPRETATION ECG - MUSE: NORMAL
P AXIS - MUSE: NORMAL DEGREES
PR INTERVAL - MUSE: NORMAL MS
QRS DURATION - MUSE: 76 MS
QT - MUSE: 308 MS
QTC - MUSE: 429 MS
R AXIS - MUSE: 28 DEGREES
SYSTOLIC BLOOD PRESSURE - MUSE: NORMAL MMHG
T AXIS - MUSE: 200 DEGREES
VENTRICULAR RATE- MUSE: 117 BPM

## 2025-06-02 NOTE — PROGRESS NOTES
Pre-procedure instructions - Coronary Angiogram  Patient Education    Your arrival time is TBD.  [place] [address] - [facility] Waiting Room  Please plan on being at the hospital all day.  At any time, emergencies and/or urgent cases may come up which could delay the start of your procedure.    Pre-procedure instructions - Coronary Angiogram  Shower in the evening before or the morning of the procedure  No solid food for 8 hours prior and nothing to drink 2 hours prior to arrival time  You can take your morning medications (except for diabetic and blood thinners) with sips of water.  Take 325 mg of Asprin 24 hours prior to the procedure and the morning of procedure.   You will need to arrange a ride to drop you off and pick you up, as you will be unable to drive home.  Prior to discharge you may be required to lay flat for approximately 2-4 hours in the recovery unit to ensure proper clotting of the artery.                Anticoagulation Medication Instructions   apixaban (ELIQUIS) - Hold 48 hours prior to procedure    You will need to follow up with one of our cardiology APPs 1-2 weeks after your procedure. If you need help scheduling or rescheduling your appointment, please call [phone]     Spoke w/ pt. Relayed information below. Pt verbalized understanding.

## 2025-06-04 ENCOUNTER — PATIENT OUTREACH (OUTPATIENT)
Dept: INTERNAL MEDICINE | Facility: CLINIC | Age: 89
End: 2025-06-04
Payer: COMMERCIAL

## 2025-06-04 NOTE — PROGRESS NOTES
Clinic Care Coordination Contact  Clinic Care Coordination Contact  OUTREACH    Referral Information:  Referred by PCP for Assisted Living Facility discussion.         Chief Complaint   Patient presents with    Clinic Care Coordination - Initial        Universal Utilization: 98% Admission or ED Risk        Utilization      No Show Count (past year)  3             ED Visits  8             Hospital Admissions  4                    Current as of: 6/4/2025  7:06 AM                Clinical Concerns:  Current Medical Concerns:  See Problem List    Current Behavioral Concerns: NA    Education Provided to patient: NA      Health Maintenance Reviewed:    Clinical Pathway: None    Medication Management:    Medications not reviewed this encounter.      Lifestyle & Psychosocial Needs:    Social Drivers of Health     Food Insecurity: Low Risk  (5/8/2025)    Food Insecurity     Within the past 12 months, did you worry that your food would run out before you got money to buy more?: No     Within the past 12 months, did the food you bought just not last and you didn t have money to get more?: No   Depression: Not at risk (2/7/2025)    PHQ-2     PHQ-2 Score: 0   Housing Stability: Low Risk  (5/8/2025)    Housing Stability     Do you have housing? : Yes     Are you worried about losing your housing?: No   Recent Concern: Housing Stability - High Risk (4/2/2025)    Housing Stability     Do you have housing? : No     Are you worried about losing your housing?: No   Tobacco Use: Medium Risk (5/23/2025)    Patient History     Smoking Tobacco Use: Former     Smokeless Tobacco Use: Never     Passive Exposure: Not on file   Financial Resource Strain: Low Risk  (5/8/2025)    Financial Resource Strain     Within the past 12 months, have you or your family members you live with been unable to get utilities (heat, electricity) when it was really needed?: No   Alcohol Use: Not on file   Transportation Needs: Low Risk  (5/8/2025)     Transportation Needs     Within the past 12 months, has lack of transportation kept you from medical appointments, getting your medicines, non-medical meetings or appointments, work, or from getting things that you need?: No   Physical Activity: Not on file   Interpersonal Safety: Low Risk  (5/8/2025)    Interpersonal Safety     Do you feel physically and emotionally safe where you currently live?: Yes     Within the past 12 months, have you been hit, slapped, kicked or otherwise physically hurt by someone?: No     Within the past 12 months, have you been humiliated or emotionally abused in other ways by your partner or ex-partner?: No   Stress: Not on file   Social Connections: Not on file   Health Literacy: Not on file         Patient/Caregiver understanding: Patient states understanding. Patient has no further questions or concerns regarding above assessment. Patient is aware to call the clinic and reach out to the care team with any further questions.          Future Appointments                In 1 month Marzena Martin MD [facility]    In 1 month  LAB [facility]    In 1 month  CV DEVICE 1 [facility]    In 1 month [id number] [facility]    In 1 month Randa Ann NP [facility]    In 4 months  ICD REMOTE [facility]            Plan:    called Patient today at scheduled time. Patient lives with her spouse in a home and they are considering moving to [facility]. Patient reported her doctors feel it's safest for [facility], and Patient agrees.  Patient and her spouse have been looking at [facility] options in [place]. Been visiting several places. Has one more to look at, is making an appointment soon. They want to look at this last one before making a decision.     Patient reported having no  further questions or concerns at this time. Patient will contact the clinic if new Care Coordination needs arise.     will close the Care Coordination program at this time. CYDNEY ESQUEDA will perform no further monitoring/outreaches at this time and will remain available as needed. If new needs arise, a new Care Coordination Referral may be placed.      MIKAELA Paige  Social Work Care Coordinator  Primary Care Clinic  [facility]  Direct Phone: [phone]

## 2025-06-30 DIAGNOSIS — I12.9 RENAL HYPERTENSION: Primary | ICD-10-CM

## 2025-06-30 DIAGNOSIS — N18.31 STAGE 3A CHRONIC KIDNEY DISEASE (H): ICD-10-CM

## 2025-07-07 ENCOUNTER — OFFICE VISIT (OUTPATIENT)
Dept: NEPHROLOGY | Facility: CLINIC | Age: 89
End: 2025-07-07
Payer: COMMERCIAL

## 2025-07-07 VITALS
SYSTOLIC BLOOD PRESSURE: 146 MMHG | HEART RATE: 88 BPM | WEIGHT: 145 LBS | DIASTOLIC BLOOD PRESSURE: 76 MMHG | BODY MASS INDEX: 23.4 KG/M2

## 2025-07-07 DIAGNOSIS — D63.1 ANEMIA IN STAGE 4 CHRONIC KIDNEY DISEASE (H): ICD-10-CM

## 2025-07-07 DIAGNOSIS — I25.118 CORONARY ARTERY DISEASE OF NATIVE ARTERY OF NATIVE HEART WITH STABLE ANGINA PECTORIS: ICD-10-CM

## 2025-07-07 DIAGNOSIS — D50.9 IRON DEFICIENCY ANEMIA, UNSPECIFIED IRON DEFICIENCY ANEMIA TYPE: Primary | ICD-10-CM

## 2025-07-07 DIAGNOSIS — E87.20 METABOLIC ACIDOSIS: ICD-10-CM

## 2025-07-07 DIAGNOSIS — N18.4 ANEMIA IN STAGE 4 CHRONIC KIDNEY DISEASE (H): ICD-10-CM

## 2025-07-07 DIAGNOSIS — N18.4 CKD (CHRONIC KIDNEY DISEASE) STAGE 4, GFR 15-29 ML/MIN (H): ICD-10-CM

## 2025-07-07 PROCEDURE — 3078F DIAST BP <80 MM HG: CPT | Performed by: INTERNAL MEDICINE

## 2025-07-07 PROCEDURE — 3077F SYST BP >= 140 MM HG: CPT | Performed by: INTERNAL MEDICINE

## 2025-07-07 PROCEDURE — 99214 OFFICE O/P EST MOD 30 MIN: CPT | Performed by: INTERNAL MEDICINE

## 2025-07-07 RX ORDER — METOPROLOL TARTRATE 100 MG/1
100 TABLET ORAL 2 TIMES DAILY
Qty: 180 TABLET | Refills: 3 | Status: SHIPPED | OUTPATIENT
Start: 2025-07-07

## 2025-07-07 RX ORDER — METOPROLOL TARTRATE 25 MG/1
25 TABLET, FILM COATED ORAL 2 TIMES DAILY
Qty: 180 TABLET | Refills: 3 | Status: SHIPPED | OUTPATIENT
Start: 2025-07-07

## 2025-07-07 RX ORDER — LOSARTAN POTASSIUM 50 MG/1
50 TABLET ORAL 2 TIMES DAILY
Qty: 180 TABLET | Refills: 3 | Status: SHIPPED | OUTPATIENT
Start: 2025-07-07

## 2025-07-07 RX ORDER — SODIUM BICARBONATE 650 MG/1
650 TABLET ORAL DAILY
Qty: 90 TABLET | Refills: 3 | Status: SHIPPED | OUTPATIENT
Start: 2025-07-07

## 2025-07-07 NOTE — PROGRESS NOTES
Nephrology Progress Note       Assessment and Plan:   89 year old female with history of long standing HTN, CAD s/p multiple stents, who presents for followup of CKD stage 4, baseline SCr 1.8-2.2 mg/dL now with proteinuria. She had another angiogram with stent done June 2022 and November 2023 (she now has 10 stents). She had watchman in August 2024 for her atrial fibrillation. She had GI bleed in summer 2024    1. CKD stage 4- baseline SCr 1.8-2.2mg/ dL, eGFR 25-29 ml/min. Mild/ minimal proteinuria now up to > 1gram, due to ? Uncontrolled BP  - recent urine albumin/cr 1247 mg/g, improved from 1939 on 10/8, was down to 243 mg/g in 5/2024.   Her swelling was previously occasional. She's had more swelling since taking amlodipine 10 mg daily and had been taking furosemide 40 mg BID. Now only taking amlodipine 5 mg daily and 40mg once a day of lasix. Swelling is OK , takes 20mg in PM if needed.  - creatinine stable at 1.6, up to 1.7 and last Scr up to 2, on losartan 25mg bid currently  - Has proteinuria - may be due to BP not ideally controlled. Recheck with next labs in 2-3 weeks  - BP  -140s at home, some 130s- will increase losartan  - monitor labs, relatively stable at this time  - on losartan, will increase to 50mg for proteinuria and BP control    2. Electrolytes/Acid Base status- normal.   Hypokalemia-last K up to 3.8- also taking KCl; ER 20 mEq daily, may stop this if K higher with increasing ACE-I/ ARB-   Hypernatremia- increase hydration  Metabolic acidosis- bicarbonate was lower at 17, now up to 27; lower sodium bicarb 650 mg to once a day    3. Hypertension/Volume status-  She is 140s at home,, and in office today 146/76  She is on amlodipine 5 mg daily, isosorbide mononitrate 90 mg daily, metoprolol 125 mg BID, furosemide 40 mg daily, an extra 20 mg in pm as needed (rarely uses), now on losartan 25 mg BID  started lisinopril 5 mg daily- had cough so it was stopped; Went to ER in November for cough with blood  - no etiology found-   - continue checking BP at home    - follows with cardiology for Afib, intolerance to AC due to GIB, s/p Watchman procedure on 8/4/2024. Off eliquis, now taking plavix; ASA stopped after bleeding   -patient reports history of reaction/intolerance to Spironolactone, Chlorthalidone, Clonidine and Terazosin in the past   - had new stent to RCA placed on 7/17/23 and another in November 2023  - recently met with cardiology and will be repeating ECHO  - Last ECHO 8/29/2024, limited and compared to 8/8/2024  Interpretation Summary  Left ventricular size, wall motion and function are normal. The ejection  fraction is 60-65%.  Right ventricular function, chamber size, wall motion, and thickness are  normal.  Mild to moderate mitral insufficiency is present.  Mild to moderate tricuspid insufficiency is present.  Dilation of the inferior vena cava is present with abnormal respiratory  variation in diameter. No pericardial effusion is present.    4. Anemia- hgb improved to >11, iron sat low normal, if hgb lower would consider iron supplementation   - hgb from 8.2 in August 2024, from 7.5 on 7/5/24.  seen in ED on 7/5, Hgb 6.9 was transfused. Last iron sat 14%,  - history of transfusions- she was seen by hematology and GI. Recommended to stop oral iron and start IV iron due to potential for GI bleeding and difficulty in knowing if black stools are from iron or bleeding.   - continue follow up labs      5. BMD  - calcium  and phosphorus were normal  -secondary hyperparathyroidism-PTH was 93  - vit D is high normal at 60- repeat yearly.     6. Elevated uric acid  Uric acid 4.7, from 6.4 from 10.4 (7/2016)  - continue allopurinol    Assessment and plan was discussed with patient and she voiced her understanding and agreement.    #Disposition: labs in 2-3 weeks with cardiology, return in 6 months with Mario Alberto 1 year Mario    Reason for Visit:  Tessa Mansfield is an 89 year old female with HTN and CAD, who  presents for CKD management.     HPI:  She is a pleasant female with PMMHx significant for stage III CKD presumed secondary to hypertensive nephrosclerosis.Her renal function has been  relatively stable, ranging from 1.8-2.2 mg/dL over the years. She has history of CAD s/p multiple stents  (8 stents in all) since 2004 and a pacemaker in 2007. She follows with cardiologist Dr Santoyo and Dr Zhu.    She had elected stent placed in RCA on 7/17/23 to help with angina.     She was admitted from 7/19 to 7/23/23 for melena secondary to bleeding colonic angiodysplastic lesion, acute on chronic anemia. S/p repair.     She has been bleeding again and was admitted from 7/5-7/6/24 with acute on chronic anemia and had another transfusion. She has been referred to anemia clinic.     She has been taking furosemide once a day for a while, takes 20 mg in the afternoon as needed.    She is eating a little better. Per patient she has lost ~ 20 lbs since March, now stabilized.   She denies any further diarrhea no n/v.     She had another angiogram in November 2023, she was having chest pain (back pain ) with exertion which resolved after stent.    She is s/p watchman procedure on 8/4, Watchman procedure complicated by new pericardial effusion without tamponade. S/p pericardiocentesis 8/8 with 220 ml off. There was no contrast extravasation noted. She was admitted for post procedural monitoring with pericardial drain in place. Ultimately the drain was removed and she was discharged home.     She is following closely with cardiology and will be having another ECHO in July.     Her son was ?diagnosed with Albina Gehrig but now [facility] told him it is not ALS- relieved. Other son has a pancreatic lesion being monitored.       She is feeling Ok but did go to ER in November, due to cough with blood tinged sputum. No etiology was found. She is now off ASA and only on plavix.   BP's have been upper 140s, and was started on lisinopril previous  visit. She had ongoing dry cough so was switched to losartan last visit in December. Her cough has improved. Her BP's are okay.  She has mild swelling.  She takes second furosemide as needed    She is moving to senior living and excited. She has lost some weight and now maintaining.  Her last labs in May 2025 were stable.  We disussed her proteinuria and blood pressure and will increase her losartan to 50mg BID. Also will change metorprolol to 100mg and 25mg for two tablets    Home BP: 140s/    Baseline Cr: 1.8-2.2    ROS:  A comprehensive review of systems was obtained and negative, except as noted in the HPI or PMH.    Active Medical Problems:  Patient Active Problem List   Diagnosis    Degeneration of cervical intervertebral disc    Nonallopathic lesion of cervical region    Nonallopathic lesion of thoracic region    Coronary artery disease of native artery of native heart with stable angina pectoris    Dizziness and giddiness    Edema    Esophageal reflux    Gout    Essential hypertension    Obstructive sleep apnea    Osteomalacia    Post-menopausal osteoporosis    Cardiac Pacemaker- [employer]tronic, dual chamber- NOT dependant    Transient complete heart block (H)    Sinus node dysfunction (H)    Disturbance of skin sensation    NSTEMI (non-ST elevated myocardial infarction) (H)    Arrhythmia    Sick sinus syndrome (H)    Non-rheumatic mitral regurgitation    Non-rheumatic tricuspid valve insufficiency    Anemia of chronic renal failure, stage 4 (severe) (H)    Status post coronary angiogram    Secondary renal hyperparathyroidism    Thrombocytopenia    Renal hypertension    Acute on chronic diastolic (congestive) heart failure (H)    Chest pain, unspecified type    Long term (current) use of anticoagulants    Melena    General weakness    S/P coronary artery stent placement    Antiplatelet or antithrombotic long-term use    Anemia, unspecified type    Aftercare following surgery of the musculoskeletal system     Closed right femoral fracture (H)    Hyperlipidemia    Hypokalemia    Orthostatic hypotension    Osteoarthritis of right patellofemoral joint    Postoperative anemia due to acute blood loss    Chronic anemia    ACP (advance care planning)    Chronic kidney disease, stage 3 (H)    Biomechanical lesion, unspecified    GERD (gastroesophageal reflux disease)    Weakness    Acute cystitis with hematuria    Leukocytosis, unspecified type    Small bowel obstruction (H)    UGIB (upper gastrointestinal bleed)    Paroxysmal atrial fibrillation (H)    Stage 3a chronic kidney disease (H)    Symptomatic anemia    Gastrointestinal hemorrhage, unspecified gastrointestinal hemorrhage type    Atrial fibrillation (H)    Restless legs    Hypertensive heart and kidney disease    History of myocardial infarction    Chronic cough    Unsteady gait    Effusion of right knee    Hip pain, left    Ambulatory dysfunction       Personal Hx:   Social History     Socioeconomic History    Marital status:      Spouse name: Not on file    Number of children: 4    Years of education: Not on file    Highest education level: Not on file   Occupational History     Employer: [employer]   Tobacco Use    Smoking status: Former     Current packs/day: 0.30     Average packs/day: 0.3 packs/day for 15.0 years (4.5 ttl pk-yrs)     Types: Cigarettes    Smokeless tobacco: Never    Tobacco comments:     Quit 35+ years ago   Vaping Use    Vaping status: Never Used   Substance and Sexual Activity    Alcohol use: Not on file    Drug use: No    Sexual activity: Not Currently     Partners: Male     Birth control/protection: Post-menopausal   Other Topics Concern     Service Not Asked    Blood Transfusions Yes    Caffeine Concern Not Asked    Occupational Exposure Not Asked    Hobby Hazards Not Asked    Sleep Concern Not Asked    Stress Concern Not Asked    Weight Concern Not Asked    Special Diet Not Asked    Back Care Not Asked    Exercise  No    Bike Helmet Not Asked    Seat Belt Not Asked    Self-Exams Not Asked    Parent/sibling w/ CABG, MI or angioplasty before 65F 55M? Not Asked   Social History Narrative    Not on file     Social Drivers of Health     Financial Resource Strain: Low Risk  (5/8/2025)    Financial Resource Strain     Within the past 12 months, have you or your family members you live with been unable to get utilities (heat, electricity) when it was really needed?: No   Food Insecurity: Low Risk  (5/8/2025)    Food Insecurity     Within the past 12 months, did you worry that your food would run out before you got money to buy more?: No     Within the past 12 months, did the food you bought just not last and you didn t have money to get more?: No   Transportation Needs: Low Risk  (5/8/2025)    Transportation Needs     Within the past 12 months, has lack of transportation kept you from medical appointments, getting your medicines, non-medical meetings or appointments, work, or from getting things that you need?: No   Physical Activity: Not on file   Stress: Not on file   Social Connections: Not on file   Interpersonal Safety: Low Risk  (5/8/2025)    Interpersonal Safety     Do you feel physically and emotionally safe where you currently live?: Yes     Within the past 12 months, have you been hit, slapped, kicked or otherwise physically hurt by someone?: No     Within the past 12 months, have you been humiliated or emotionally abused in other ways by your partner or ex-partner?: No   Housing Stability: Low Risk  (5/8/2025)    Housing Stability     Do you have housing? : Yes     Are you worried about losing your housing?: No   Recent Concern: Housing Stability - High Risk (4/2/2025)    Housing Stability     Do you have housing? : No     Are you worried about losing your housing?: No       Allergies:  Allergies   Allergen Reactions    Shrimp Swelling    Codeine Sulfate Itching    Indomethacin      Other Reaction(s): Not available     "Levofloxacin Hemihydrate      Other Reaction(s): Not available    Morphine Sulfate      Other Reaction(s): Not available    Oxycodone Other (See Comments)    Shrimp Extract Swelling    Sulfamethoxazole W/Trimethoprim      Other Reaction(s): Not available    Chlorthalidone Nausea and Vomiting     Other Reaction(s): Not available    Clarithromycin      Other Reaction(s): Not available    Clonidine      Other Reaction(s): Not available    Darvon [Propoxyphene Hcl]     Gabapentin Confusion and Fatigue     \"felt drunk\"    Other Reaction(s): Not available    Hydromorphone Hallucination and Visual Disturbance     Tolerated in April 2024 hospital admissions    Other Reaction(s): Not available    Indomethacin     Percocet [Oxycodone-Acetaminophen] Hallucination    Pregabalin Fatigue and Itching    Simvastatin Palpitations and Cramps     Muscle weakness, leg cramping    Spironolactone      Dehydrated    Other Reaction(s): Not available    Terazosin      Other Reaction(s): Not available       Current Outpatient Medications   Medication Sig Dispense Refill    acetaminophen (TYLENOL) 325 MG tablet Take 3 tablets (975 mg) by mouth every 8 hours as needed for mild pain. 90 tablet 0    allopurinol (ZYLOPRIM) 100 MG tablet TAKE 1 TABLET BY MOUTH ONCE DAILY *NEEDS TO HAVE LABS FOR FURTHER REFILLS* 90 tablet 0    amLODIPine (NORVASC) 5 MG tablet Take 1 tablet (5 mg) by mouth daily. 90 tablet 3    clopidogrel (PLAVIX) 75 MG tablet Take 1 tablet (75 mg) by mouth daily. 90 tablet 3    dabigatran ANTICOAGULANT (PRADAXA) 75 MG capsule Take 1 capsule (75 mg) by mouth 2 times daily. Store in original 's bottle or blister pack; use within 120 days of opening. 60 capsule 11    furosemide (LASIX) 40 MG tablet Take 1 tablet (40 mg) by mouth daily. May take an additional 20 mg at noon as needed for swelling. 90 tablet 3    isosorbide mononitrate (IMDUR) 30 MG 24 hr tablet Take 3 tablets (90 mg) by mouth daily. 270 tablet 3    " isosorbide mononitrate (IMDUR) 30 MG 24 hr tablet Take 4 tablets (120 mg) by mouth daily.      lidocaine (LIDODERM) 5 % patch Place 1 patch over 12 hours onto the skin every 24 hours. To prevent lidocaine toxicity, patient should be patch free for 12 hrs daily. 10 patch 0    losartan (COZAAR) 25 MG tablet Take 1 tablet (25 mg) by mouth 2 times daily. 180 tablet 3    methocarbamol (ROBAXIN) 500 MG tablet Take 1 tablet (500 mg) by mouth every 6 hours as needed for muscle spasms. 30 tablet 5    metoprolol tartrate (LOPRESSOR) 25 MG tablet Take 5 tablets (125 mg) by mouth 2 times daily. 600 tablet 3    metoprolol tartrate (LOPRESSOR) 25 MG tablet Take 5 tablets (125 mg) by mouth 2 times daily.      Multiple Vitamins-Minerals (PRESERVISION AREDS 2+MULTI VIT PO) Take 1 capsule by mouth 2 times daily      omeprazole (PRILOSEC) 40 MG DR capsule Take 1 capsule (40 mg) by mouth daily. 90 capsule 3    oxyCODONE (ROXICODONE) 5 MG tablet Take 0.5-1 tablets (2.5-5 mg) by mouth every 4 hours as needed for moderate to severe pain (IF pain not managed with non-pharmacological and non-opioid interventions). Oxycodone 2.5 mg every 4 hours as needed for moderate pain. Oxycodone 5 mg every 4 hours as needed for severe pain. 12 tablet 0    potassium chloride ER (K-TAB) 20 MEQ CR tablet Take 1 tablet (20 mEq) by mouth daily 90 tablet 3    psyllium (METAMUCIL/KONSYL) Packet Take 1 packet by mouth daily as needed for constipation.      rosuvastatin (CRESTOR) 20 MG tablet Take 1 tablet by mouth once daily 90 tablet 0    sodium bicarbonate 650 MG tablet Take 1 tablet (650 mg) by mouth 2 times daily 60 tablet 11    timolol maleate (TIMOPTIC) 0.5 % ophthalmic solution INSTILL 1 DROP INTO EACH EYE ONCE DAILY IN THE MORNING      vitamin B-12 (CYANOCOBALAMIN) 500 MCG tablet Take 1 tablet by mouth daily      vitamin D3 25 mcg (1000 units) tablet Take 1 tablet (25 mcg) by mouth every other day 90 tablet 3     No current facility-administered  medications for this visit.       Vitals:  BP (!) 146/76   Pulse 88   Wt 65.8 kg (145 lb)   BMI 23.40 kg/m      Exam:  General: awake and alert, no acute distress  Skin: warm and dry, no visible rash  Heart: Reg rate and rhythm  Lungs: CTA  Extremities: no significant LE edema     Results:  Last Comprehensive Metabolic Panel:  Sodium   Date Value Ref Range Status   05/09/2025 139 135 - 145 mmol/L Final   08/26/2020 141 133 - 144 mmol/L Final     Potassium   Date Value Ref Range Status   05/09/2025 3.8 3.4 - 5.3 mmol/L Final   11/21/2022 4.8 3.4 - 5.3 mmol/L Final   08/26/2020 4.4 3.4 - 5.3 mmol/L Final     Potassium POCT   Date Value Ref Range Status   08/08/2024 3.5 3.4 - 5.3 mmol/L Final     Comment:     CRITICAL RESULTS NOTED BY CCL and MD     Chloride   Date Value Ref Range Status   05/09/2025 104 98 - 107 mmol/L Final   11/21/2022 110 (H) 94 - 109 mmol/L Final   08/26/2020 111 (H) 94 - 109 mmol/L Final     Carbon Dioxide   Date Value Ref Range Status   08/26/2020 24 20 - 32 mmol/L Final     Carbon Dioxide (CO2)   Date Value Ref Range Status   05/09/2025 27 22 - 29 mmol/L Final   11/21/2022 24 20 - 32 mmol/L Final     Anion Gap   Date Value Ref Range Status   05/09/2025 8 7 - 15 mmol/L Final   11/21/2022 6 3 - 14 mmol/L Final   08/26/2020 8 3 - 14 mmol/L Final     Glucose   Date Value Ref Range Status   05/09/2025 89 70 - 99 mg/dL Final   11/21/2022 79 70 - 99 mg/dL Final   08/26/2020 84 70 - 99 mg/dL Final     GLUCOSE BY METER POCT   Date Value Ref Range Status   04/30/2025 87 70 - 99 mg/dL Final     Urea Nitrogen   Date Value Ref Range Status   05/09/2025 27.1 (H) 8.0 - 23.0 mg/dL Final   11/21/2022 35 (H) 7 - 30 mg/dL Final   08/26/2020 41 (H) 7 - 30 mg/dL Final     Creatinine   Date Value Ref Range Status   05/09/2025 2.05 (H) 0.51 - 0.95 mg/dL Final   08/26/2020 1.70 (H) 0.52 - 1.04 mg/dL Final     GFR Estimate   Date Value Ref Range Status   05/09/2025 23 (L) >60 mL/min/1.73m2 Final     Comment:      eGFR calculated using 2021 CKD-EPI equation.   08/26/2020 27 (L) >60 mL/min/[1.73_m2] Final     Comment:     Non  GFR Calc  Starting 12/18/2018, serum creatinine based estimated GFR (eGFR) will be   calculated using the Chronic Kidney Disease Epidemiology Collaboration   (CKD-EPI) equation.       Calcium   Date Value Ref Range Status   05/09/2025 8.6 (L) 8.8 - 10.4 mg/dL Final   08/26/2020 9.0 8.5 - 10.1 mg/dL Final       Last Basic Metabolic Panel:  Lab Results   Component Value Date     11/08/2017      Lab Results   Component Value Date    POTASSIUM 3.5 11/08/2017     Lab Results   Component Value Date    CHLORIDE 104 11/08/2017     Lab Results   Component Value Date    ALEXANDRO 8.8 11/08/2017     Lab Results   Component Value Date    CO2 26 11/08/2017     Lab Results   Component Value Date    BUN 54 11/08/2017     Lab Results   Component Value Date    CR 2.36 11/08/2017     Lab Results   Component Value Date    GLC 88 11/08/2017     Marzena Martin MD  Associate Professor of Medicine  Department of Nephrology  [facility]

## 2025-07-28 ENCOUNTER — ANCILLARY PROCEDURE (OUTPATIENT)
Dept: CARDIOLOGY | Facility: CLINIC | Age: 89
End: 2025-07-28
Attending: INTERNAL MEDICINE
Payer: COMMERCIAL

## 2025-07-28 ENCOUNTER — OFFICE VISIT (OUTPATIENT)
Dept: CARDIOLOGY | Facility: CLINIC | Age: 89
End: 2025-07-28
Attending: NURSE PRACTITIONER
Payer: COMMERCIAL

## 2025-07-28 ENCOUNTER — LAB (OUTPATIENT)
Dept: LAB | Facility: CLINIC | Age: 89
End: 2025-07-28
Attending: NURSE PRACTITIONER
Payer: COMMERCIAL

## 2025-07-28 VITALS
WEIGHT: 148.9 LBS | BODY MASS INDEX: 24.03 KG/M2 | DIASTOLIC BLOOD PRESSURE: 90 MMHG | HEART RATE: 73 BPM | SYSTOLIC BLOOD PRESSURE: 191 MMHG | OXYGEN SATURATION: 100 %

## 2025-07-28 DIAGNOSIS — D63.1 ANEMIA IN STAGE 4 CHRONIC KIDNEY DISEASE (H): ICD-10-CM

## 2025-07-28 DIAGNOSIS — Z95.818 PRESENCE OF WATCHMAN LEFT ATRIAL APPENDAGE CLOSURE DEVICE: Primary | ICD-10-CM

## 2025-07-28 DIAGNOSIS — I48.0 PAROXYSMAL ATRIAL FIBRILLATION (H): ICD-10-CM

## 2025-07-28 DIAGNOSIS — N18.4 CKD (CHRONIC KIDNEY DISEASE) STAGE 4, GFR 15-29 ML/MIN (H): ICD-10-CM

## 2025-07-28 DIAGNOSIS — I25.118 CORONARY ARTERY DISEASE OF NATIVE ARTERY OF NATIVE HEART WITH STABLE ANGINA PECTORIS: ICD-10-CM

## 2025-07-28 DIAGNOSIS — I12.9 RENAL HYPERTENSION: ICD-10-CM

## 2025-07-28 DIAGNOSIS — Z95.818 PRESENCE OF WATCHMAN LEFT ATRIAL APPENDAGE CLOSURE DEVICE: ICD-10-CM

## 2025-07-28 DIAGNOSIS — I49.5 SICK SINUS SYNDROME (H): ICD-10-CM

## 2025-07-28 DIAGNOSIS — D50.9 IRON DEFICIENCY ANEMIA, UNSPECIFIED IRON DEFICIENCY ANEMIA TYPE: ICD-10-CM

## 2025-07-28 DIAGNOSIS — E87.20 METABOLIC ACIDOSIS: ICD-10-CM

## 2025-07-28 DIAGNOSIS — I50.33 ACUTE ON CHRONIC DIASTOLIC HEART FAILURE (H): ICD-10-CM

## 2025-07-28 DIAGNOSIS — N18.4 ANEMIA IN STAGE 4 CHRONIC KIDNEY DISEASE (H): ICD-10-CM

## 2025-07-28 DIAGNOSIS — N18.31 STAGE 3A CHRONIC KIDNEY DISEASE (H): ICD-10-CM

## 2025-07-28 LAB
ALBUMIN MFR UR ELPH: 142 MG/DL
ALBUMIN SERPL BCG-MCNC: 3.9 G/DL (ref 3.5–5.2)
ALBUMIN UR-MCNC: 100 MG/DL
ANION GAP SERPL CALCULATED.3IONS-SCNC: 13 MMOL/L (ref 7–15)
APPEARANCE UR: CLEAR
BILIRUB UR QL STRIP: NEGATIVE
BUN SERPL-MCNC: 26.8 MG/DL (ref 8–23)
CALCIUM SERPL-MCNC: 9.1 MG/DL (ref 8.8–10.4)
CHLORIDE SERPL-SCNC: 110 MMOL/L (ref 98–107)
COLOR UR AUTO: ABNORMAL
CREAT SERPL-MCNC: 1.77 MG/DL (ref 0.51–0.95)
CREAT UR-MCNC: 32.6 MG/DL
CREAT UR-MCNC: 33.4 MG/DL
EGFRCR SERPLBLD CKD-EPI 2021: 27 ML/MIN/1.73M2
ERYTHROCYTE [DISTWIDTH] IN BLOOD BY AUTOMATED COUNT: 15.7 % (ref 10–15)
FERRITIN SERPL-MCNC: 242 NG/ML (ref 11–328)
GLUCOSE SERPL-MCNC: 90 MG/DL (ref 70–99)
GLUCOSE UR STRIP-MCNC: NEGATIVE MG/DL
HCO3 SERPL-SCNC: 23 MMOL/L (ref 22–29)
HCT VFR BLD AUTO: 31.2 % (ref 35–47)
HGB BLD-MCNC: 9.5 G/DL (ref 11.7–15.7)
HGB UR QL STRIP: NEGATIVE
IRON BINDING CAPACITY (ROCHE): 259 UG/DL (ref 240–430)
IRON SATN MFR SERPL: 15 % (ref 15–46)
IRON SERPL-MCNC: 38 UG/DL (ref 37–145)
KETONES UR STRIP-MCNC: NEGATIVE MG/DL
LEUKOCYTE ESTERASE UR QL STRIP: ABNORMAL
LVEF ECHO: NORMAL
MCH RBC QN AUTO: 29.4 PG (ref 26.5–33)
MCHC RBC AUTO-ENTMCNC: 30.4 G/DL (ref 31.5–36.5)
MCV RBC AUTO: 97 FL (ref 78–100)
MICROALBUMIN UR-MCNC: 812 MG/L
MICROALBUMIN/CREAT UR: 2431.14 MG/G CR (ref 0–25)
NITRATE UR QL: NEGATIVE
PH UR STRIP: 7 [PH] (ref 5–7)
PHOSPHATE SERPL-MCNC: 3.2 MG/DL (ref 2.5–4.5)
PLAT MORPH BLD: NORMAL
PLATELET # BLD AUTO: 97 10E3/UL (ref 150–450)
POTASSIUM SERPL-SCNC: 3.6 MMOL/L (ref 3.4–5.3)
PROT/CREAT 24H UR: 4.36 MG/MG CR (ref 0–0.2)
RBC # BLD AUTO: 3.23 10E6/UL (ref 3.8–5.2)
RBC MORPH BLD: NORMAL
RBC URINE: 2 /HPF
SODIUM SERPL-SCNC: 146 MMOL/L (ref 135–145)
SP GR UR STRIP: 1.01 (ref 1–1.03)
SQUAMOUS EPITHELIAL: 2 /HPF
UROBILINOGEN UR STRIP-MCNC: NORMAL MG/DL
WBC # BLD AUTO: 7.1 10E3/UL (ref 4–11)
WBC URINE: 6 /HPF

## 2025-07-28 PROCEDURE — 93306 TTE W/DOPPLER COMPLETE: CPT | Performed by: INTERNAL MEDICINE

## 2025-07-28 PROCEDURE — 36415 COLL VENOUS BLD VENIPUNCTURE: CPT | Performed by: PATHOLOGY

## 2025-07-28 PROCEDURE — 99214 OFFICE O/P EST MOD 30 MIN: CPT | Mod: 25 | Performed by: NURSE PRACTITIONER

## 2025-07-28 PROCEDURE — 80069 RENAL FUNCTION PANEL: CPT | Performed by: PATHOLOGY

## 2025-07-28 PROCEDURE — 3077F SYST BP >= 140 MM HG: CPT | Performed by: NURSE PRACTITIONER

## 2025-07-28 PROCEDURE — 84156 ASSAY OF PROTEIN URINE: CPT | Performed by: PATHOLOGY

## 2025-07-28 PROCEDURE — 3079F DIAST BP 80-89 MM HG: CPT | Performed by: NURSE PRACTITIONER

## 2025-07-28 PROCEDURE — 82728 ASSAY OF FERRITIN: CPT | Performed by: PATHOLOGY

## 2025-07-28 PROCEDURE — 81001 URINALYSIS AUTO W/SCOPE: CPT | Performed by: PATHOLOGY

## 2025-07-28 PROCEDURE — G0463 HOSPITAL OUTPT CLINIC VISIT: HCPCS | Performed by: NURSE PRACTITIONER

## 2025-07-28 PROCEDURE — 85027 COMPLETE CBC AUTOMATED: CPT | Performed by: PATHOLOGY

## 2025-07-28 PROCEDURE — 93280 PM DEVICE PROGR EVAL DUAL: CPT | Performed by: INTERNAL MEDICINE

## 2025-07-28 PROCEDURE — 82043 UR ALBUMIN QUANTITATIVE: CPT | Performed by: PHYSICIAN ASSISTANT

## 2025-07-28 PROCEDURE — 1126F AMNT PAIN NOTED NONE PRSNT: CPT | Performed by: NURSE PRACTITIONER

## 2025-07-28 PROCEDURE — 99000 SPECIMEN HANDLING OFFICE-LAB: CPT | Performed by: PATHOLOGY

## 2025-07-28 RX ORDER — TORSEMIDE 10 MG/1
40 TABLET ORAL DAILY
Qty: 360 TABLET | Refills: 3 | Status: SHIPPED | OUTPATIENT
Start: 2025-07-28

## 2025-07-28 ASSESSMENT — PAIN SCALES - GENERAL: PAINLEVEL_OUTOF10: NO PAIN (0)

## 2025-07-28 NOTE — PROGRESS NOTES
[facility] HEART CARE  CARDIOVASCULAR DIVISION    STRUCTURAL CLINIC RETURN VISIT    PRIMARY CARDIOLOGIST: Dr. Santoyo      PERTINENT CLINICAL HISTORY:     Tessa Mansfield is a very pleasant 89 year old female who presents for 1 year Watchman follow-up. 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 She underwent successful left atrial appendage closure with a 31mm WATCHMAN FLX device on August 8, 2024. Her intra procedural MICHELL demonstrated a new pericardial effusion without tamponade physiology. Patient underwent pericardiocentesis on 8/8 w/ 220 mL and drain placement. Repeat pericardioscentesis 8/11 with additional 100 mL fluid removed, drain removed 8/13 at which time echo showed trace effusion. She was discharged on Plavix monotherapy. Repeat TTE 8/29/24 showed trivial effusion.     Interval  History 9/23/24:  She reports feeling well since the procedure. She is fully recovered after her pericardiocentesis. She denies any residual chest pain or chest pressure. She denies SOB with regular activities, but has SOB with overexertion. This is unchanged. No orthopnea, PND, leg swelling, weight gain. No palpitations, lightheadedness or syncope. Puncture site from pericardiocentesis and groin are fully healed. She is on Plavix monotherapy. No GI bleeding.     Interval History 2/7/25:  She is doing well. She is able to perform ADLs and ambulates around the store with her walker. She is able to walk two blocks without chest pain or shortness of breath. She has no orthopnea, PND, weight gain. She has leg swelling which improves with lasix 40 mg daily. She takes an extra 20 mg if swelling doesn't go down. She maybe takes this once a week. No lightheadedness, palpitations, syncope. Home BP has been well controlled in the 130s/70s.     Interval history 7/28/2025  Patient presents to  clinic today for 1 year follow-up after watchman placed.  Patient recently cardioverted and continued on dabigatran.  Patient is also on Plavix monotherapy for significant CAD. Discussed with patient blood pressure control as today she is hypertensive but did not take her morning medications due to lab draw.  Patient does not monitor and will start for the next 2 weeks and send in results.  Patient denies any chest pain, shortness of breath, lightheadedness or dizziness.  Patient does endorse having swelling in her feet but forgot to take Lasix in the afternoon the past two days.     PAST MEDICAL HISTORY:   # intolerance to AC due to GIB s/p Watchman (8/4/24)  # pericardial effusion s/p pericardiocentesis 8/8, 8/11  # CAD s/p multiple PCIs  # HLD  # HTN  # Chronic diastolic heart failure  # SSS s/p dual chamber PPM  # Concern for infection  # Leukocytosis  # SHAJI on CKD IV  # Elevated LFT's  # Acute blood loss anemia, stable  # Anemia of chronic disease  # History of GIB 2/2 AVM's  # Left groin hematoma     PAST SURGICAL HISTORY:     Past Surgical History:   Procedure Laterality Date    ANESTHESIA CARDIOVERSION N/A 4/30/2025    Procedure: Anesthesia cardioversion;  Surgeon: GENERIC ANESTHESIA PROVIDER;  Location: [facility] OR    CHOLECYSTECTOMY      CV CORONARY ANGIOGRAM N/A 6/17/2022    Procedure: Coronary Angiogram;  Surgeon: Constantine Macias MD;  Location: [facility] CARDIAC CATH LAB    CV CORONARY ANGIOGRAM N/A 7/17/2023    Procedure: Coronary Angiogram;  Surgeon: Constantine Macias MD;  Location: [facility] CARDIAC CATH LAB    CV LEFT ATRIAL APPENDAGE CLOSURE N/A 8/8/2024    Procedure: Left Atrial Appendage Closure;  Surgeon: Rodrick Garcia MD;  Location: [facility] CARDIAC CATH LAB    CV PCI N/A 6/17/2022    Procedure: Percutaneous Coronary Intervention;  Surgeon: Constantine Macias MD;  Location: [facility] CARDIAC CATH LAB    CV PCI N/A 7/17/2023    Procedure: Percutaneous Coronary Intervention;  Surgeon:  Constantine Macias MD;  Location:  HEART CARDIAC CATH LAB    CV PCI N/A 11/6/2023    Procedure: Percutaneous Coronary Intervention;  Surgeon: Constantine Macias MD;  Location:  HEART CARDIAC CATH LAB    CV PERICARDIOCENTESIS N/A 8/8/2024    Procedure: Pericardiocentesis;  Surgeon: Rodrick Garcia MD;  Location:  HEART CARDIAC CATH LAB    CV PERICARDIOCENTESIS N/A 8/11/2024    Procedure: Pericardial Drain Adjustment, Possible New Pericardial Drain Placement;  Surgeon: Rodrick Garcia MD;  Location:  HEART CARDIAC CATH LAB    CV RIGHT HEART CATH MEASUREMENTS RECORDED N/A 6/17/2022    Procedure: Right Heart Catheterization;  Surgeon: Constantine Macias MD;  Location:  HEART CARDIAC CATH LAB    EP PACEMAKER N/A 10/15/2019    Procedure: EP PACEMAKER;  Surgeon: Anthony Zhu MD;  Location:  HEART CARDIAC CATH LAB    ESOPHAGOSCOPY, GASTROSCOPY, DUODENOSCOPY (EGD), COMBINED N/A 7/20/2023    Procedure: Esophagoscopy, gastroscopy, duodenoscopy (EGD), combined;  Surgeon: Bekah Dash DO;  Location:  GI    ESOPHAGOSCOPY, GASTROSCOPY, DUODENOSCOPY (EGD), COMBINED N/A 5/2/2024    Procedure: Esophagoscopy, gastroscopy, duodenoscopy (EGD), combined;  Surgeon: Tavo Donaldson MD;  Location:  GI    HC PPM INSERTION NEW/REPLACEMENT W/ ATRIAL&VENTRICULAR LEAD  11-    HYSTERECTOMY      LAPAROTOMY EXPLORATORY N/A 4/13/2024    Procedure: Laparotomy exploratory, Wound Vac Placement.;  Surgeon: Anthony Trammell MD;  Location: [facility] OR    LAPAROTOMY EXPLORATORY N/A 4/14/2024    Procedure: Abdominal Re-Exploration and Closure;  Surgeon: Anthony Trammell MD;  Location: [facility]    LAPAROTOMY EXPLORATORY N/A 4/13/2024    Procedure: Exploratory Laparotomy;  Surgeon: Anthony Trammell MD;  Location: [facility] OR        CURRENT MEDICATIONS:     Current Outpatient Medications   Medication Sig Dispense Refill    acetaminophen (TYLENOL) 325 MG tablet Take 3 tablets (975 mg) by mouth  every 8 hours as needed for mild pain. 90 tablet 0    allopurinol (ZYLOPRIM) 100 MG tablet TAKE 1 TABLET BY MOUTH ONCE DAILY *NEEDS TO HAVE LABS FOR FURTHER REFILLS* 90 tablet 0    amLODIPine (NORVASC) 5 MG tablet Take 1 tablet (5 mg) by mouth daily. 90 tablet 3    clopidogrel (PLAVIX) 75 MG tablet Take 1 tablet (75 mg) by mouth daily. 90 tablet 3    dabigatran ANTICOAGULANT (PRADAXA) 75 MG capsule Take 1 capsule (75 mg) by mouth 2 times daily. Store in original 's bottle or blister pack; use within 120 days of opening. 60 capsule 11    furosemide (LASIX) 40 MG tablet Take 1 tablet (40 mg) by mouth daily. May take an additional 20 mg at noon as needed for swelling. 90 tablet 3    isosorbide mononitrate (IMDUR) 30 MG 24 hr tablet Take 3 tablets (90 mg) by mouth daily. 270 tablet 3    isosorbide mononitrate (IMDUR) 30 MG 24 hr tablet Take 4 tablets (120 mg) by mouth daily.      lidocaine (LIDODERM) 5 % patch Place 1 patch over 12 hours onto the skin every 24 hours. To prevent lidocaine toxicity, patient should be patch free for 12 hrs daily. 10 patch 0    losartan (COZAAR) 50 MG tablet Take 1 tablet (50 mg) by mouth 2 times daily. 180 tablet 3    methocarbamol (ROBAXIN) 500 MG tablet Take 1 tablet (500 mg) by mouth every 6 hours as needed for muscle spasms. 30 tablet 5    metoprolol tartrate (LOPRESSOR) 100 MG tablet Take 1 tablet (100 mg) by mouth 2 times daily. 180 tablet 3    metoprolol tartrate (LOPRESSOR) 25 MG tablet Take 1 tablet (25 mg) by mouth 2 times daily. 180 tablet 3    Multiple Vitamins-Minerals (PRESERVISION AREDS 2+MULTI VIT PO) Take 1 capsule by mouth 2 times daily      omeprazole (PRILOSEC) 40 MG DR capsule Take 1 capsule (40 mg) by mouth daily. 90 capsule 3    oxyCODONE (ROXICODONE) 5 MG tablet Take 0.5-1 tablets (2.5-5 mg) by mouth every 4 hours as needed for moderate to severe pain (IF pain not managed with non-pharmacological and non-opioid interventions). Oxycodone 2.5 mg every 4  "hours as needed for moderate pain. Oxycodone 5 mg every 4 hours as needed for severe pain. 12 tablet 0    potassium chloride ER (K-TAB) 20 MEQ CR tablet Take 1 tablet by mouth once daily 90 tablet 0    psyllium (METAMUCIL/KONSYL) Packet Take 1 packet by mouth daily as needed for constipation.      rosuvastatin (CRESTOR) 20 MG tablet Take 1 tablet by mouth once daily 90 tablet 0    sodium bicarbonate 650 MG tablet Take 1 tablet (650 mg) by mouth daily. 90 tablet 3    timolol maleate (TIMOPTIC) 0.5 % ophthalmic solution INSTILL 1 DROP INTO EACH EYE ONCE DAILY IN THE MORNING      vitamin B-12 (CYANOCOBALAMIN) 500 MCG tablet Take 1 tablet by mouth daily      vitamin D3 25 mcg (1000 units) tablet Take 1 tablet (25 mcg) by mouth every other day 90 tablet 3        ALLERGIES:     Allergies   Allergen Reactions    Shrimp Swelling    Codeine Sulfate Itching    Indomethacin      Other Reaction(s): Not available    Levofloxacin Hemihydrate      Other Reaction(s): Not available    Morphine Sulfate      Other Reaction(s): Not available    Oxycodone Other (See Comments)    Shrimp Extract Swelling    Sulfamethoxazole W/Trimethoprim      Other Reaction(s): Not available    Chlorthalidone Nausea and Vomiting     Other Reaction(s): Not available    Clarithromycin      Other Reaction(s): Not available    Clonidine      Other Reaction(s): Not available    Darvon [Propoxyphene Hcl]     Gabapentin Confusion and Fatigue     \"felt drunk\"    Other Reaction(s): Not available    Hydromorphone Hallucination and Visual Disturbance     Tolerated in April 2024 hospital admissions    Other Reaction(s): Not available    Indomethacin     Percocet [Oxycodone-Acetaminophen] Hallucination    Pregabalin Fatigue and Itching    Simvastatin Palpitations and Cramps     Muscle weakness, leg cramping    Spironolactone      Dehydrated    Other Reaction(s): Not available    Terazosin      Other Reaction(s): Not available        FAMILY HISTORY:     Family History "   Problem Relation Age of Onset    Heart Disease Father     Cancer Sister 82        bladder cancer    Cancer Sister     Heart Disease Brother     Hypertension Brother     Early Death Brother         SOCIAL HISTORY:     Social History     Socioeconomic History    Marital status:     Number of children: 4   Occupational History     Employer: [employer]   Tobacco Use    Smoking status: Former     Current packs/day: 0.30     Average packs/day: 0.3 packs/day for 15.0 years (4.5 ttl pk-yrs)     Types: Cigarettes    Smokeless tobacco: Never    Tobacco comments:     Quit 35+ years ago   Vaping Use    Vaping status: Never Used   Substance and Sexual Activity    Drug use: No    Sexual activity: Not Currently     Partners: Male     Birth control/protection: Post-menopausal   Other Topics Concern    Blood Transfusions Yes    Exercise No     Social Determinants of Health     Financial Resource Strain: Low Risk  (11/15/2023)    Financial Resource Strain     Within the past 12 months, have you or your family members you live with been unable to get utilities (heat, electricity) when it was really needed?: No   Food Insecurity: Low Risk  (11/15/2023)    Food Insecurity     Within the past 12 months, did you worry that your food would run out before you got money to buy more?: No     Within the past 12 months, did the food you bought just not last and you didn t have money to get more?: No   Transportation Needs: Low Risk  (11/15/2023)    Transportation Needs     Within the past 12 months, has lack of transportation kept you from medical appointments, getting your medicines, non-medical meetings or appointments, work, or from getting things that you need?: No   Interpersonal Safety: Low Risk  (11/15/2023)    Interpersonal Safety     Do you feel physically and emotionally safe where you currently live?: Yes     Within the past 12 months, have you been hit, slapped, kicked or otherwise physically hurt by someone?:  No     Within the past 12 months, have you been humiliated or emotionally abused in other ways by your partner or ex-partner?: No   Housing Stability: Low Risk  (11/15/2023)    Housing Stability     Do you have housing? : Yes     Are you worried about losing your housing?: No        REVIEW OF SYSTEMS:     Constitutional: No fevers or chills  Skin: No new rash or itching  Eyes: No acute change in vision  Ears/Nose/Throat: No purulent rhinorrhea, new hearing loss, or new vertigo  Respiratory: No cough or hemoptysis  Cardiovascular: See HPI  Gastrointestinal: No change in appetite, vomiting, hematemesis or diarrhea  Genitourinary: No dysuria or hematuria  Musculoskeletal: No new back pain, neck pain or muscle pain  Neurologic: No new headaches, focal weakness or behavior changes  Psychiatric: No hallucinations, excessive alcohol consumption or illegal drug usage  Hematologic/Lymphatic/Immunologic: No bleeding, chills, fever, night sweats or weight loss  Endocrine: No new cold intolerance, heat intolerance, polyphagia, polydipsia or polyuria      PHYSICAL EXAMINATION:     Visit Vitals  BP (!) 191/90 (BP Location: Right arm, Patient Position: Chair, Cuff Size: Adult Regular)   Pulse 73     GENERAL: No acute distress.  HEENT: EOMI. Sclerae white, not injected. Nares clear. Pharynx without erythema or exudate.   Neck: No adenopathy. No thyromegaly. No jugular venous distension.   Heart: Regular rate and rhythm. 1/6 dyastolic murmur.   Lungs: Clear to auscultation. No ronchi, wheezes, rales.   Abdomen: Soft, nontender, nondistended. Bowel sounds present.  Extremities: No clubbing, cyanosis, with mild nonpitting edema.   Neurologic: Alert and oriented to person/place/time, normal speech and affect. No focal deficits.  Skin: No petechiae, purpura or rash.     LABORATORY DATA:     LIPID RESULTS:  Lab Results   Component Value Date    CHOL 94 02/07/2025    CHOL 153 08/26/2020    HDL 41 (L) 02/07/2025    HDL 52 08/26/2020    LDL  "32 02/07/2025    LDL 71 08/26/2020    TRIG 105 02/07/2025    TRIG 149 08/26/2020    CHOLHDLRATIO 2.8 12/30/2014       LIVER ENZYME RESULTS:  Lab Results   Component Value Date    AST 43 05/07/2025    AST 15 06/25/2018    ALT 27 05/07/2025    ALT 14 06/25/2018       CBC RESULTS:  Lab Results   Component Value Date    WBC 7.1 07/28/2025    WBC 5.4 08/26/2020    RBC 3.23 (L) 07/28/2025    RBC 3.58 (L) 08/26/2020    HGB 9.5 (L) 07/28/2025    HGB 11.2 (L) 08/26/2020    HCT 31.2 (L) 07/28/2025    HCT 36.0 08/26/2020    MCV 97 07/28/2025     (H) 08/26/2020    MCH 29.4 07/28/2025    MCH 31.3 08/26/2020    MCHC 30.4 (L) 07/28/2025    MCHC 31.1 (L) 08/26/2020    RDW 15.7 (H) 07/28/2025    RDW 14.9 08/26/2020    PLT 97 (L) 07/28/2025     (L) 08/26/2020       BMP RESULTS:  Lab Results   Component Value Date     (H) 07/28/2025     08/26/2020    POTASSIUM 3.6 07/28/2025    POTASSIUM 3.5 08/08/2024    POTASSIUM 4.8 11/21/2022    POTASSIUM 4.4 08/26/2020    CHLORIDE 110 (H) 07/28/2025    CHLORIDE 110 (H) 11/21/2022    CHLORIDE 111 (H) 08/26/2020    CO2 23 07/28/2025    CO2 24 11/21/2022    CO2 24 08/26/2020    ANIONGAP 13 07/28/2025    ANIONGAP 6 11/21/2022    ANIONGAP 8 08/26/2020    GLC 90 07/28/2025    GLC 87 04/30/2025    GLC 79 11/21/2022    GLC 84 08/26/2020    BUN 26.8 (H) 07/28/2025    BUN 35 (H) 11/21/2022    BUN 41 (H) 08/26/2020    CR 1.77 (H) 07/28/2025    CR 1.70 (H) 08/26/2020    GFRESTIMATED 27 (L) 07/28/2025    GFRESTIMATED 27 (L) 08/26/2020    GFRESTBLACK 32 (L) 08/26/2020    ALEXANDRO 9.1 07/28/2025    ALEXANDRO 9.0 08/26/2020        A1C RESULTS:  No results found for: \"A1C\"    INR RESULTS:  Lab Results   Component Value Date    INR 1.44 (H) 05/07/2025    INR 1.43 (H) 04/30/2025    INR 1.08 12/27/2013    INR 1.15 (H) 08/06/2010          PROCEDURES & FURTHER ASSESSMENTS:     ECHO: 07/28/2025  Interpretation Summary  Global and regional left ventricular function is normal with an EF of 55-60%.  Global " right ventricular function is normal.  Moderate mitral insufficiency is present.  Moderate to severe tricuspid insufficiency is present.  IVC diameter >2.1 cm collapsing <50% with sniff suggests a high RA pressure  estimated at 15 mmHg or greater.  This study was compared with the study from 4/30/25: No significant changes  noted.    MICHELL 10/1/24    Interpretation Summary  MICHELL ordered after placement of 31mm Watchman device on 8/8/24 for interval  evaluation.     The left atrial occlusion device is well seated. There is no caridad-device color  flow on Doppler interrogation. There is no device-associated thrombus.     The atrial septum is intact as assessed by color Doppler.     Moderate mitral insufficiency was present (likely secondary to hypertension,  systolic BP was 180 mmHg at the beginning of the exam). ERO area 0.22 cm^2,  MR  volume 46 mL.  ______________________________________________________________________________  Procedure  Transesophageal Echocardiogram with color and spectral Doppler performed.  Procedure location Echo Lab. The procedure was performed in the Echo Lab.  Informed consent for Transesophegeal echo obtained. MICHELL Probe #67 was used  during the procedure. Patient was sedated using Fentanyl 25 mcg. Patient was  sedated using Versed 3 mg. The heart rate, respiratory rate, oxygen  saturations, blood pressure, and response to care were monitored throughout  the procedure with the assistance of the nurse. I determined this patient to  be an appropriate candidate for the planned sedation and procedure and have  reassessed the patient immediately prior to sedation and procedure. Total  sedation time: 37 minutes of continuous bedside 1:1 monitoring. The Transducer  was inserted without difficulty . The patient tolerated the procedure well.  Complications None.     Left Ventricle  The visual ejection fraction is 60-65%. Left ventricular size is normal.     Right Ventricle  Global right ventricular  function is normal. The right ventricle is normal  size.     Atria  The atrial septum is intact as assessed by color Doppler . S/p placement of  31mm Watchman device on 8/8/24. An ASD closure device was seen in the expected  anatomic position.The device was well seated and without residual shunt by  color Doppler mapping. A pacemaker lead is noted in the right atrium.     Mitral Valve  Moderate mitral insufficiency is present.     Aortic Valve  The aortic valve is tricuspid. No AI.     Tricuspid Valve  Moderate tricuspid insufficiency is present. Right ventricular systolic  pressure is 19mmHg above the right atrial pressure.     Pulmonic Valve  Trace pulmonic insufficiency is present.     Vessels  Ascending aorta 2.9 cm.     Pericardium  No pericardial effusion is present.     Compared to Previous Study  No prior MICHELL for comparison.    TTE dated 8/29/24:  ______________________________________________________________________________  Interpretation Summary  Left ventricular size, wall motion and function are normal. The ejection  fraction is 60-65%.  Right ventricular function, chamber size, wall motion, and thickness are  normal.  Mild to moderate mitral insufficiency is present.  Mild to moderate tricuspid insufficiency is present.  Dilation of the inferior vena cava is present with abnormal respiratory  variation in diameter. No pericardial effusion is present.     No significant changes noted.  ______________________________________________________________________________  Left Ventricle  Left ventricular size, wall motion and function are normal. The ejection  fraction is 60-65%. Thickening of the anterobasal septum is present. No  regional wall motion abnormalities are seen.     Right Ventricle  Right ventricular function, chamber size, wall motion, and thickness are  normal. A pacemaker lead is noted in the right ventricle.     Atria  The atria cannot be assessed.     Mitral Valve  Mild mitral annular  calcification is present. Mild to moderate mitral  insufficiency is present.     Aortic Valve  The aortic valve cannot be assessed.     Tricuspid Valve  Mild to moderate tricuspid insufficiency is present. The right ventricular  systolic pressure is approximated at 27.4 mmHg plus the right atrial pressure.     Pulmonic Valve  The pulmonic valve is normal. Trace pulmonic insufficiency is present.     Vessels  The aorta root is normal. Dilation of the inferior vena cava is present with  abnormal respiratory variation in diameter. IVC diameter >2.1 cm collapsing  <50% with sniff suggests a high RA pressure estimated at 15 mmHg or greater.     Pericardium  No pericardial effusion is present.     Compared to Previous Study  No significant changes noted.  ______________________________________________________________________________  Doppler Measurements & Calculations  TR max tawanna: 261.8 cm/sec  TR max P.4 mmHg        CLINICAL IMPRESSION:     Tessa Mansfield is a very pleasant 89 year old female who presents for 1 year Watchman follow-up.      Hospital Course by Diagnosis:  # Paroxysmal atrial fibrillation  # intolerance to AC due to GIB s/p Watchman (24)  # pericardial effusion s/p pericardiocentesis   Patient with a CHADsVASC 8 and HASBLED of 5. She has had difficulty tolerating eliquis in the past due to recurrent GIB in the setting of AVMs. S/p 31mm WATCHMAN FLX device placement on 2024  Watchman procedure complicated by new pericardial effusion without tamponade. S/p pericardiocentesis  with 220 ml off. Patient admitted for post procedural monitoring with pericardial drain in place. Drain removed 813 PM. MICHELL at 45 days showed well seated watchman without leak or thrombus. No effusion on follow-up MICHELL. She is doing well today no cardiac symptoms.   - Continue Plavix 75 mg daily monotherapy   -Consider discontinuing dabigatran, watchman in place  - Continue metoprolol tartrate 125 mg BID     #  CAD s/p multiple PCIs  # HLD  Extensive CAD history with multiple PCIs in the past. Most recently patient had a PCI to the mLAD on 11/26/23.  - Plavix as above  lifelong   - Continue Imdur to 120 mg daily  - Continue rosuvastatin 20 mg daily      # HTN, not well controlled   # Chronic heart failure with preserved ejection fraction  # Moderate MR  Dilated IVC on 07/28/2025 echo. Noted to have moderate MR. Likely due to elevated BP and fluid hypervolemia at the time.   - Volume status: Hypervolemic stop lasix 40 mg BID    -Change Torsemide 40 mg daily  - Continue amlodipine 5 mg daily      #SSS s/p dual chamber PPM  - OP EP follow up    # SHAJI on CKD IV,   Baseline Cr 1.7-2.0.   -  Recent creat 2.05 and GFR 23  - BMP one week     # Acute blood loss anemia, improving   # Anemia of chronic disease  # History of GIB 2/2 AVMs  Baseline Hgb ~7.5-9.0   CBC shows hemoglobin 9.5.  Will discuss with the EP cardiology to discontinue dabigatran with lab follow-up in 1 months to recheck.    RTC: 1 months with labs prior    JI Loyola, CNP  [facility]     Addendum 7/29/25:  Discussed with EP and okay to stop dabigatran now that > 1 month s/p DCCV and watchman in place. Continue plavix monotherapy. Anticipate anemia should begin to improve now that we are stopping AC.        CC  Patient Care Team:  Pedro Gomez MD as PCP - General (Family Practice)  Adriana Lilly MD as MD (Urology)  Lilly Ritchie RN as Nurse Coordinator  Tyrell Santoyo MD as MD (Cardiology)  Henny Larry MD as MD (Otolaryngology)  Pedro Gomez MD as Assigned PCP  Gladys Ratliff, RN as Specialty Care Coordinator (Nephrology)  Randa Ann NP as Assigned Heart and Vascular Provider  Chad Blair MD as MD (Critical Care)  Randa Ann NP as Nurse Practitioner (Cardiovascular Disease)  Barbara Dimas, RN as Specialty Care Coordinator (Pharmacy)  [facility] Home Care  Laura Petersen PA-C as Assigned Nephrology Provider  Masha Johnson, RN as Registered Nurse (Cardiology)  Yaa Reyez MD as Resident (Internal Medicine)  Donell Valdez, RN as Specialty Care Coordinator (Cardiology)

## 2025-07-28 NOTE — LETTER
7/28/2025      RE: Tessa Mansfield  [address]  [facility] [address]       Dear Colleague,    Thank you for the opportunity to participate in the care of your patient, Tessa Mansfield, at the [facility] [place] [facility]. Please see a copy of my visit note below.          STRUCTURAL HEART CARE  CARDIOVASCULAR DIVISION    STRUCTURAL CLINIC RETURN VISIT    PRIMARY CARDIOLOGIST: Dr. Santoyo      PERTINENT CLINICAL HISTORY:     Tessa Mansfield is a very pleasant 89 year old female who presents for 1 year Watchman follow-up. 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 She underwent successful left atrial appendage closure with a 31mm WATCHMAN FLX device on August 8, 2024. Her intra procedural MICHELL demonstrated a new pericardial effusion without tamponade physiology. Patient underwent pericardiocentesis on 8/8 w/ 220 mL and drain placement. Repeat pericardioscentesis 8/11 with additional 100 mL fluid removed, drain removed 8/13 at which time echo showed trace effusion. She was discharged on Plavix monotherapy. Repeat TTE 8/29/24 showed trivial effusion.     Interval  History 9/23/24:  She reports feeling well since the procedure. She is fully recovered after her pericardiocentesis. She denies any residual chest pain or chest pressure. She denies SOB with regular activities, but has SOB with overexertion. This is unchanged. No orthopnea, PND, leg swelling, weight gain. No palpitations, lightheadedness or syncope. Puncture site from pericardiocentesis and groin are fully healed. She is on Plavix monotherapy. No GI bleeding.     Interval History 2/7/25:  She is doing well. She is able to perform ADLs and ambulates around the store with her walker. She is able to walk two blocks without chest pain or  shortness of breath. She has no orthopnea, PND, weight gain. She has leg swelling which improves with lasix 40 mg daily. She takes an extra 20 mg if swelling doesn't go down. She maybe takes this once a week. No lightheadedness, palpitations, syncope. Home BP has been well controlled in the 130s/70s.     Interval history 7/28/2025  Patient presents to clinic today for 1 year follow-up after watchman placed.  Patient recently cardioverted and continued on dabigatran.  Patient is also on Plavix monotherapy for significant CAD. Discussed with patient blood pressure control as today she is hypertensive but did not take her morning medications due to lab draw.  Patient does not monitor and will start for the next 2 weeks and send in results.  Patient denies any chest pain, shortness of breath, lightheadedness or dizziness.  Patient does endorse having swelling in her feet but forgot to take Lasix in the afternoon the past two days.     PAST MEDICAL HISTORY:   # intolerance to AC due to GIB s/p Watchman (8/4/24)  # pericardial effusion s/p pericardiocentesis 8/8, 8/11  # CAD s/p multiple PCIs  # HLD  # HTN  # Chronic diastolic heart failure  # SSS s/p dual chamber PPM  # Concern for infection  # Leukocytosis  # SHAJI on CKD IV  # Elevated LFT's  # Acute blood loss anemia, stable  # Anemia of chronic disease  # History of GIB 2/2 AVM's  # Left groin hematoma     PAST SURGICAL HISTORY:     Past Surgical History:   Procedure Laterality Date     ANESTHESIA CARDIOVERSION N/A 4/30/2025    Procedure: Anesthesia cardioversion;  Surgeon: GENERIC ANESTHESIA PROVIDER;  Location: [facility] OR     CHOLECYSTECTOMY       CV CORONARY ANGIOGRAM N/A 6/17/2022    Procedure: Coronary Angiogram;  Surgeon: Constantine Macias MD;  Location:  HEART CARDIAC CATH LAB     CV CORONARY ANGIOGRAM N/A 7/17/2023    Procedure: Coronary Angiogram;  Surgeon: Constantine Macias MD;  Location: [facility] CARDIAC CATH LAB     CV LEFT ATRIAL APPENDAGE CLOSURE  N/A 8/8/2024    Procedure: Left Atrial Appendage Closure;  Surgeon: Rodrick Garcia MD;  Location:  HEART CARDIAC CATH LAB     CV PCI N/A 6/17/2022    Procedure: Percutaneous Coronary Intervention;  Surgeon: Constantine Macias MD;  Location:  HEART CARDIAC CATH LAB     CV PCI N/A 7/17/2023    Procedure: Percutaneous Coronary Intervention;  Surgeon: Constantine Macias MD;  Location:  HEART CARDIAC CATH LAB     CV PCI N/A 11/6/2023    Procedure: Percutaneous Coronary Intervention;  Surgeon: Constantine Macias MD;  Location:  HEART CARDIAC CATH LAB     CV PERICARDIOCENTESIS N/A 8/8/2024    Procedure: Pericardiocentesis;  Surgeon: Rodrick Garcia MD;  Location:  HEART CARDIAC CATH LAB     CV PERICARDIOCENTESIS N/A 8/11/2024    Procedure: Pericardial Drain Adjustment, Possible New Pericardial Drain Placement;  Surgeon: Rodrick Garcia MD;  Location:  HEART CARDIAC CATH LAB     CV RIGHT HEART CATH MEASUREMENTS RECORDED N/A 6/17/2022    Procedure: Right Heart Catheterization;  Surgeon: Constantine Macias MD;  Location:  HEART CARDIAC CATH LAB     EP PACEMAKER N/A 10/15/2019    Procedure: EP PACEMAKER;  Surgeon: Anthony Zhu MD;  Location: [facility] CARDIAC CATH LAB     ESOPHAGOSCOPY, GASTROSCOPY, DUODENOSCOPY (EGD), COMBINED N/A 7/20/2023    Procedure: Esophagoscopy, gastroscopy, duodenoscopy (EGD), combined;  Surgeon: Bekah Dash DO;  Location:  GI     ESOPHAGOSCOPY, GASTROSCOPY, DUODENOSCOPY (EGD), COMBINED N/A 5/2/2024    Procedure: Esophagoscopy, gastroscopy, duodenoscopy (EGD), combined;  Surgeon: Tavo Donaldson MD;  Location:  GI     HC PPM INSERTION NEW/REPLACEMENT W/ ATRIAL&VENTRICULAR LEAD  11-     HYSTERECTOMY       LAPAROTOMY EXPLORATORY N/A 4/13/2024    Procedure: Laparotomy exploratory, Wound Vac Placement.;  Surgeon: Anthony Trammell MD;  Location:  OR     LAPAROTOMY EXPLORATORY N/A 4/14/2024    Procedure: Abdominal  Re-Exploration and Closure;  Surgeon: Anthony Trammell MD;  Location: [facility] OR     LAPAROTOMY EXPLORATORY N/A 4/13/2024    Procedure: Exploratory Laparotomy;  Surgeon: Anthony Trammell MD;  Location: [facility] OR        CURRENT MEDICATIONS:     Current Outpatient Medications   Medication Sig Dispense Refill     acetaminophen (TYLENOL) 325 MG tablet Take 3 tablets (975 mg) by mouth every 8 hours as needed for mild pain. 90 tablet 0     allopurinol (ZYLOPRIM) 100 MG tablet TAKE 1 TABLET BY MOUTH ONCE DAILY *NEEDS TO HAVE LABS FOR FURTHER REFILLS* 90 tablet 0     amLODIPine (NORVASC) 5 MG tablet Take 1 tablet (5 mg) by mouth daily. 90 tablet 3     clopidogrel (PLAVIX) 75 MG tablet Take 1 tablet (75 mg) by mouth daily. 90 tablet 3     dabigatran ANTICOAGULANT (PRADAXA) 75 MG capsule Take 1 capsule (75 mg) by mouth 2 times daily. Store in original 's bottle or blister pack; use within 120 days of opening. 60 capsule 11     furosemide (LASIX) 40 MG tablet Take 1 tablet (40 mg) by mouth daily. May take an additional 20 mg at noon as needed for swelling. 90 tablet 3     isosorbide mononitrate (IMDUR) 30 MG 24 hr tablet Take 3 tablets (90 mg) by mouth daily. 270 tablet 3     isosorbide mononitrate (IMDUR) 30 MG 24 hr tablet Take 4 tablets (120 mg) by mouth daily.       lidocaine (LIDODERM) 5 % patch Place 1 patch over 12 hours onto the skin every 24 hours. To prevent lidocaine toxicity, patient should be patch free for 12 hrs daily. 10 patch 0     losartan (COZAAR) 50 MG tablet Take 1 tablet (50 mg) by mouth 2 times daily. 180 tablet 3     methocarbamol (ROBAXIN) 500 MG tablet Take 1 tablet (500 mg) by mouth every 6 hours as needed for muscle spasms. 30 tablet 5     metoprolol tartrate (LOPRESSOR) 100 MG tablet Take 1 tablet (100 mg) by mouth 2 times daily. 180 tablet 3     metoprolol tartrate (LOPRESSOR) 25 MG tablet Take 1 tablet (25 mg) by mouth 2 times daily. 180 tablet 3     Multiple Vitamins-Minerals  "(PRESERVISION AREDS 2+MULTI VIT PO) Take 1 capsule by mouth 2 times daily       omeprazole (PRILOSEC) 40 MG DR capsule Take 1 capsule (40 mg) by mouth daily. 90 capsule 3     oxyCODONE (ROXICODONE) 5 MG tablet Take 0.5-1 tablets (2.5-5 mg) by mouth every 4 hours as needed for moderate to severe pain (IF pain not managed with non-pharmacological and non-opioid interventions). Oxycodone 2.5 mg every 4 hours as needed for moderate pain. Oxycodone 5 mg every 4 hours as needed for severe pain. 12 tablet 0     potassium chloride ER (K-TAB) 20 MEQ CR tablet Take 1 tablet by mouth once daily 90 tablet 0     psyllium (METAMUCIL/KONSYL) Packet Take 1 packet by mouth daily as needed for constipation.       rosuvastatin (CRESTOR) 20 MG tablet Take 1 tablet by mouth once daily 90 tablet 0     sodium bicarbonate 650 MG tablet Take 1 tablet (650 mg) by mouth daily. 90 tablet 3     timolol maleate (TIMOPTIC) 0.5 % ophthalmic solution INSTILL 1 DROP INTO EACH EYE ONCE DAILY IN THE MORNING       vitamin B-12 (CYANOCOBALAMIN) 500 MCG tablet Take 1 tablet by mouth daily       vitamin D3 25 mcg (1000 units) tablet Take 1 tablet (25 mcg) by mouth every other day 90 tablet 3        ALLERGIES:     Allergies   Allergen Reactions     Shrimp Swelling     Codeine Sulfate Itching     Indomethacin      Other Reaction(s): Not available     Levofloxacin Hemihydrate      Other Reaction(s): Not available     Morphine Sulfate      Other Reaction(s): Not available     Oxycodone Other (See Comments)     Shrimp Extract Swelling     Sulfamethoxazole W/Trimethoprim      Other Reaction(s): Not available     Chlorthalidone Nausea and Vomiting     Other Reaction(s): Not available     Clarithromycin      Other Reaction(s): Not available     Clonidine      Other Reaction(s): Not available     Darvon [Propoxyphene Hcl]      Gabapentin Confusion and Fatigue     \"felt drunk\"    Other Reaction(s): Not available     Hydromorphone Hallucination and Visual " Disturbance     Tolerated in April 2024 hospital admissions    Other Reaction(s): Not available     Indomethacin      Percocet [Oxycodone-Acetaminophen] Hallucination     Pregabalin Fatigue and Itching     Simvastatin Palpitations and Cramps     Muscle weakness, leg cramping     Spironolactone      Dehydrated    Other Reaction(s): Not available     Terazosin      Other Reaction(s): Not available        FAMILY HISTORY:     Family History   Problem Relation Age of Onset     Heart Disease Father      Cancer Sister 82        bladder cancer     Cancer Sister      Heart Disease Brother      Hypertension Brother      Early Death Brother         SOCIAL HISTORY:     Social History     Socioeconomic History     Marital status:      Number of children: 4   Occupational History     Employer: [employer]   Tobacco Use     Smoking status: Former     Current packs/day: 0.30     Average packs/day: 0.3 packs/day for 15.0 years (4.5 ttl pk-yrs)     Types: Cigarettes     Smokeless tobacco: Never     Tobacco comments:     Quit 35+ years ago   Vaping Use     Vaping status: Never Used   Substance and Sexual Activity     Drug use: No     Sexual activity: Not Currently     Partners: Male     Birth control/protection: Post-menopausal   Other Topics Concern     Blood Transfusions Yes     Exercise No     Social Determinants of Health     Financial Resource Strain: Low Risk  (11/15/2023)    Financial Resource Strain      Within the past 12 months, have you or your family members you live with been unable to get utilities (heat, electricity) when it was really needed?: No   Food Insecurity: Low Risk  (11/15/2023)    Food Insecurity      Within the past 12 months, did you worry that your food would run out before you got money to buy more?: No      Within the past 12 months, did the food you bought just not last and you didn t have money to get more?: No   Transportation Needs: Low Risk  (11/15/2023)    Transportation Needs       Within the past 12 months, has lack of transportation kept you from medical appointments, getting your medicines, non-medical meetings or appointments, work, or from getting things that you need?: No   Interpersonal Safety: Low Risk  (11/15/2023)    Interpersonal Safety      Do you feel physically and emotionally safe where you currently live?: Yes      Within the past 12 months, have you been hit, slapped, kicked or otherwise physically hurt by someone?: No      Within the past 12 months, have you been humiliated or emotionally abused in other ways by your partner or ex-partner?: No   Housing Stability: Low Risk  (11/15/2023)    Housing Stability      Do you have housing? : Yes      Are you worried about losing your housing?: No        REVIEW OF SYSTEMS:     Constitutional: No fevers or chills  Skin: No new rash or itching  Eyes: No acute change in vision  Ears/Nose/Throat: No purulent rhinorrhea, new hearing loss, or new vertigo  Respiratory: No cough or hemoptysis  Cardiovascular: See HPI  Gastrointestinal: No change in appetite, vomiting, hematemesis or diarrhea  Genitourinary: No dysuria or hematuria  Musculoskeletal: No new back pain, neck pain or muscle pain  Neurologic: No new headaches, focal weakness or behavior changes  Psychiatric: No hallucinations, excessive alcohol consumption or illegal drug usage  Hematologic/Lymphatic/Immunologic: No bleeding, chills, fever, night sweats or weight loss  Endocrine: No new cold intolerance, heat intolerance, polyphagia, polydipsia or polyuria      PHYSICAL EXAMINATION:     Visit Vitals  BP (!) 191/90 (BP Location: Right arm, Patient Position: Chair, Cuff Size: Adult Regular)   Pulse 73     GENERAL: No acute distress.  HEENT: EOMI. Sclerae white, not injected. Nares clear. Pharynx without erythema or exudate.   Neck: No adenopathy. No thyromegaly. No jugular venous distension.   Heart: Regular rate and rhythm. 1/6 dyastolic murmur.   Lungs: Clear to auscultation. No  ronchi, wheezes, rales.   Abdomen: Soft, nontender, nondistended. Bowel sounds present.  Extremities: No clubbing, cyanosis, with mild nonpitting edema.   Neurologic: Alert and oriented to person/place/time, normal speech and affect. No focal deficits.  Skin: No petechiae, purpura or rash.     LABORATORY DATA:     LIPID RESULTS:  Lab Results   Component Value Date    CHOL 94 02/07/2025    CHOL 153 08/26/2020    HDL 41 (L) 02/07/2025    HDL 52 08/26/2020    LDL 32 02/07/2025    LDL 71 08/26/2020    TRIG 105 02/07/2025    TRIG 149 08/26/2020    CHOLHDLRATIO 2.8 12/30/2014       LIVER ENZYME RESULTS:  Lab Results   Component Value Date    AST 43 05/07/2025    AST 15 06/25/2018    ALT 27 05/07/2025    ALT 14 06/25/2018       CBC RESULTS:  Lab Results   Component Value Date    WBC 7.1 07/28/2025    WBC 5.4 08/26/2020    RBC 3.23 (L) 07/28/2025    RBC 3.58 (L) 08/26/2020    HGB 9.5 (L) 07/28/2025    HGB 11.2 (L) 08/26/2020    HCT 31.2 (L) 07/28/2025    HCT 36.0 08/26/2020    MCV 97 07/28/2025     (H) 08/26/2020    MCH 29.4 07/28/2025    MCH 31.3 08/26/2020    MCHC 30.4 (L) 07/28/2025    MCHC 31.1 (L) 08/26/2020    RDW 15.7 (H) 07/28/2025    RDW 14.9 08/26/2020    PLT 97 (L) 07/28/2025     (L) 08/26/2020       BMP RESULTS:  Lab Results   Component Value Date     (H) 07/28/2025     08/26/2020    POTASSIUM 3.6 07/28/2025    POTASSIUM 3.5 08/08/2024    POTASSIUM 4.8 11/21/2022    POTASSIUM 4.4 08/26/2020    CHLORIDE 110 (H) 07/28/2025    CHLORIDE 110 (H) 11/21/2022    CHLORIDE 111 (H) 08/26/2020    CO2 23 07/28/2025    CO2 24 11/21/2022    CO2 24 08/26/2020    ANIONGAP 13 07/28/2025    ANIONGAP 6 11/21/2022    ANIONGAP 8 08/26/2020    GLC 90 07/28/2025    GLC 87 04/30/2025    GLC 79 11/21/2022    GLC 84 08/26/2020    BUN 26.8 (H) 07/28/2025    BUN 35 (H) 11/21/2022    BUN 41 (H) 08/26/2020    CR 1.77 (H) 07/28/2025    CR 1.70 (H) 08/26/2020    GFRESTIMATED 27 (L) 07/28/2025    GFRESTIMATED 27 (L)  "08/26/2020    GFRESTBLACK 32 (L) 08/26/2020    ALEXANDRO 9.1 07/28/2025    ALEXANDRO 9.0 08/26/2020        A1C RESULTS:  No results found for: \"A1C\"    INR RESULTS:  Lab Results   Component Value Date    INR 1.44 (H) 05/07/2025    INR 1.43 (H) 04/30/2025    INR 1.08 12/27/2013    INR 1.15 (H) 08/06/2010          PROCEDURES & FURTHER ASSESSMENTS:     ECHO: 07/28/2025  Interpretation Summary  Global and regional left ventricular function is normal with an EF of 55-60%.  Global right ventricular function is normal.  Moderate mitral insufficiency is present.  Moderate to severe tricuspid insufficiency is present.  IVC diameter >2.1 cm collapsing <50% with sniff suggests a high RA pressure  estimated at 15 mmHg or greater.  This study was compared with the study from 4/30/25: No significant changes  noted.    MICHELL 10/1/24    Interpretation Summary  MICHELL ordered after placement of 31mm Watchman device on 8/8/24 for interval  evaluation.     The left atrial occlusion device is well seated. There is no caridad-device color  flow on Doppler interrogation. There is no device-associated thrombus.     The atrial septum is intact as assessed by color Doppler.     Moderate mitral insufficiency was present (likely secondary to hypertension,  systolic BP was 180 mmHg at the beginning of the exam). ERO area 0.22 cm^2,  MR  volume 46 mL.  ______________________________________________________________________________  Procedure  Transesophageal Echocardiogram with color and spectral Doppler performed.  Procedure location Echo Lab. The procedure was performed in the Echo Lab.  Informed consent for Transesophegeal echo obtained. MICHELL Probe #67 was used  during the procedure. Patient was sedated using Fentanyl 25 mcg. Patient was  sedated using Versed 3 mg. The heart rate, respiratory rate, oxygen  saturations, blood pressure, and response to care were monitored throughout  the procedure with the assistance of the nurse. I determined this patient to  be " an appropriate candidate for the planned sedation and procedure and have  reassessed the patient immediately prior to sedation and procedure. Total  sedation time: 37 minutes of continuous bedside 1:1 monitoring. The Transducer  was inserted without difficulty . The patient tolerated the procedure well.  Complications None.     Left Ventricle  The visual ejection fraction is 60-65%. Left ventricular size is normal.     Right Ventricle  Global right ventricular function is normal. The right ventricle is normal  size.     Atria  The atrial septum is intact as assessed by color Doppler . S/p placement of  31mm Watchman device on 8/8/24. An ASD closure device was seen in the expected  anatomic position.The device was well seated and without residual shunt by  color Doppler mapping. A pacemaker lead is noted in the right atrium.     Mitral Valve  Moderate mitral insufficiency is present.     Aortic Valve  The aortic valve is tricuspid. No AI.     Tricuspid Valve  Moderate tricuspid insufficiency is present. Right ventricular systolic  pressure is 19mmHg above the right atrial pressure.     Pulmonic Valve  Trace pulmonic insufficiency is present.     Vessels  Ascending aorta 2.9 cm.     Pericardium  No pericardial effusion is present.     Compared to Previous Study  No prior MICHELL for comparison.    TTE dated 8/29/24:  ______________________________________________________________________________  Interpretation Summary  Left ventricular size, wall motion and function are normal. The ejection  fraction is 60-65%.  Right ventricular function, chamber size, wall motion, and thickness are  normal.  Mild to moderate mitral insufficiency is present.  Mild to moderate tricuspid insufficiency is present.  Dilation of the inferior vena cava is present with abnormal respiratory  variation in diameter. No pericardial effusion is present.     No significant changes  noted.  ______________________________________________________________________________  Left Ventricle  Left ventricular size, wall motion and function are normal. The ejection  fraction is 60-65%. Thickening of the anterobasal septum is present. No  regional wall motion abnormalities are seen.     Right Ventricle  Right ventricular function, chamber size, wall motion, and thickness are  normal. A pacemaker lead is noted in the right ventricle.     Atria  The atria cannot be assessed.     Mitral Valve  Mild mitral annular calcification is present. Mild to moderate mitral  insufficiency is present.     Aortic Valve  The aortic valve cannot be assessed.     Tricuspid Valve  Mild to moderate tricuspid insufficiency is present. The right ventricular  systolic pressure is approximated at 27.4 mmHg plus the right atrial pressure.     Pulmonic Valve  The pulmonic valve is normal. Trace pulmonic insufficiency is present.     Vessels  The aorta root is normal. Dilation of the inferior vena cava is present with  abnormal respiratory variation in diameter. IVC diameter >2.1 cm collapsing  <50% with sniff suggests a high RA pressure estimated at 15 mmHg or greater.     Pericardium  No pericardial effusion is present.     Compared to Previous Study  No significant changes noted.  ______________________________________________________________________________  Doppler Measurements & Calculations  TR max tawanna: 261.8 cm/sec  TR max P.4 mmHg        CLINICAL IMPRESSION:     Tessa Mansfield is a very pleasant 89 year old female who presents for 1 year Watchman follow-up.      Hospital Course by Diagnosis:  # Paroxysmal atrial fibrillation  # intolerance to AC due to GIB s/p Watchman (24)  # pericardial effusion s/p pericardiocentesis   Patient with a CHADsVASC 8 and HASBLED of 5. She has had difficulty tolerating eliquis in the past due to recurrent GIB in the setting of AVMs. S/p 31mm WATCHMAN FLX device placement on August  8, 2024  Watchman procedure complicated by new pericardial effusion without tamponade. S/p pericardiocentesis 8/8 with 220 ml off. Patient admitted for post procedural monitoring with pericardial drain in place. Drain removed 8/13 PM. MICHELL at 45 days showed well seated watchman without leak or thrombus. No effusion on follow-up MICHELL. She is doing well today no cardiac symptoms.   - Continue Plavix 75 mg daily monotherapy   -Consider discontinuing dabigatran, watchman in place  - Continue metoprolol tartrate 125 mg BID     # CAD s/p multiple PCIs  # HLD  Extensive CAD history with multiple PCIs in the past. Most recently patient had a PCI to the mLAD on 11/26/23.  - Plavix as above  lifelong   - Continue Imdur to 120 mg daily  - Continue rosuvastatin 20 mg daily      # HTN, not well controlled   # Chronic heart failure with preserved ejection fraction  # Moderate MR  Dilated IVC on 07/28/2025 echo. Noted to have moderate MR. Likely due to elevated BP and fluid hypervolemia at the time.   - Volume status: Hypervolemic stop    -Change Torsemide 40 mg daily  - Continue amlodipine 5 mg daily      #SSS s/p dual chamber PPM  - OP EP follow up    # SHAJI on CKD IV,   Baseline Cr 1.7-2.0.   -  Recent creat 2.05 and GFR 23  - BMP one week     # Acute blood loss anemia, improving   # Anemia of chronic disease  # History of GIB 2/2 AVMs  Baseline Hgb ~7.5-9.0   CBC shows hemoglobin 9.5.  Will discuss with the EP cardiology to discontinue dabigatran with lab follow-up in 1 months to recheck.    RTC: 1 months with labs prior      JI Loyola, CNP  [facility]       CC  Patient Care Team:  Pedro Gomez MD as PCP - General (Family Practice)  Adriana Lilly MD as MD (Urology)  Lilly Ritchie, RN as Nurse Coordinator  Tyrell Santoyo MD as MD (Cardiology)  Henny Larry MD as MD (Otolaryngology)  Pedro Gomez MD as Assigned PCP  Gladys Ratliff, RN as Specialty Care Coordinator  (Nephrology)  Randa Ann NP as Assigned Heart and Vascular Provider  Chad Blair MD as MD (Critical Care)  Randa Ann NP as Nurse Practitioner (Cardiovascular Disease)  Barbara Dimas, RN as Specialty Care Coordinator (Pharmacy)  [facility]  Laura Llanes PA-C as Assigned Nephrology Provider  Masha Johnson RN as Registered Nurse (Cardiology)  Yaa Reyez MD as Resident (Internal Medicine)  Donell Valdez RN as Specialty Care Coordinator (Cardiology)      Please do not hesitate to contact me if you have any questions/concerns.     Sincerely,     Randa Ann NP

## 2025-07-28 NOTE — PATIENT INSTRUCTIONS
You were seen today in the Cardiovascular Clinic at the [facility] by:       JI VELAZQUEZ CNP     Your visit summary and instructions are as follows:     Start taking Torsemide   Continue other current medications  Monitor blood pressure at home for the next 2 weeks  Follow up in 1 month with cardiology bigg        Thank you for your visit today!      Please MyChart message me or call my nurse if you have any questions or concerns.     During Business Hours:   [phone], Structural Heart  Renu Lynne Weathers      After hours, weekends or holidays:   [phone], Option #4  Ask to speak to the On-Call Cardiologist. Inform them you are a cardiology patient at the [place].

## 2025-07-28 NOTE — PATIENT INSTRUCTIONS
It was a pleasure to see you in clinic today.  Please do not hesitate to call with any questions or concerns.  You are scheduled for a remote transmission from home on 10/29/25.      Beatriz Gomez, RN, MS, CCRN  Electrophysiology Nurse Clinician  [facility]    During Business Hours Please Call:  [phone]  After Hours Please Call:  [phone] - select option #4 and ask for job code [id number]

## 2025-07-29 ENCOUNTER — PATIENT OUTREACH (OUTPATIENT)
Dept: CARE COORDINATION | Facility: CLINIC | Age: 89
End: 2025-07-29
Payer: COMMERCIAL

## 2025-07-29 LAB
MDC_IDC_EPISODE_DTM: NORMAL
MDC_IDC_EPISODE_DURATION: 1 S
MDC_IDC_EPISODE_DURATION: 1086 S
MDC_IDC_EPISODE_DURATION: 1175 S
MDC_IDC_EPISODE_DURATION: 1192 S
MDC_IDC_EPISODE_DURATION: 1225 S
MDC_IDC_EPISODE_DURATION: 1268 S
MDC_IDC_EPISODE_DURATION: 1273 S
MDC_IDC_EPISODE_DURATION: 1426 S
MDC_IDC_EPISODE_DURATION: 1499 S
MDC_IDC_EPISODE_DURATION: 165 S
MDC_IDC_EPISODE_DURATION: 177 S
MDC_IDC_EPISODE_DURATION: 1830 S
MDC_IDC_EPISODE_DURATION: 1859 S
MDC_IDC_EPISODE_DURATION: 2 S
MDC_IDC_EPISODE_DURATION: 2151 S
MDC_IDC_EPISODE_DURATION: 2214 S
MDC_IDC_EPISODE_DURATION: 2227 S
MDC_IDC_EPISODE_DURATION: 2387 S
MDC_IDC_EPISODE_DURATION: 2453 S
MDC_IDC_EPISODE_DURATION: 252 S
MDC_IDC_EPISODE_DURATION: 258 S
MDC_IDC_EPISODE_DURATION: 2748 S
MDC_IDC_EPISODE_DURATION: 3225 S
MDC_IDC_EPISODE_DURATION: 3303 S
MDC_IDC_EPISODE_DURATION: 3342 S
MDC_IDC_EPISODE_DURATION: 3460 S
MDC_IDC_EPISODE_DURATION: 39 S
MDC_IDC_EPISODE_DURATION: 3919 S
MDC_IDC_EPISODE_DURATION: 4122 S
MDC_IDC_EPISODE_DURATION: 420 S
MDC_IDC_EPISODE_DURATION: 4281 S
MDC_IDC_EPISODE_DURATION: 5269 S
MDC_IDC_EPISODE_DURATION: 569 S
MDC_IDC_EPISODE_DURATION: 608 S
MDC_IDC_EPISODE_DURATION: 6452 S
MDC_IDC_EPISODE_DURATION: 673 S
MDC_IDC_EPISODE_DURATION: 7274 S
MDC_IDC_EPISODE_DURATION: 764 S
MDC_IDC_EPISODE_DURATION: 7777 S
MDC_IDC_EPISODE_DURATION: 7892 S
MDC_IDC_EPISODE_DURATION: 8557 S
MDC_IDC_EPISODE_DURATION: 8816 S
MDC_IDC_EPISODE_DURATION: 916 S
MDC_IDC_EPISODE_DURATION: 9328 S
MDC_IDC_EPISODE_DURATION: 94 S
MDC_IDC_EPISODE_DURATION: 968 S
MDC_IDC_EPISODE_DURATION: 9954 S
MDC_IDC_EPISODE_DURATION: 996 S
MDC_IDC_EPISODE_DURATION: NORMAL S
MDC_IDC_EPISODE_ID: 265
MDC_IDC_EPISODE_ID: 266
MDC_IDC_EPISODE_ID: 267
MDC_IDC_EPISODE_ID: 268
MDC_IDC_EPISODE_ID: 269
MDC_IDC_EPISODE_ID: 270
MDC_IDC_EPISODE_ID: 271
MDC_IDC_EPISODE_ID: 272
MDC_IDC_EPISODE_ID: 273
MDC_IDC_EPISODE_ID: 274
MDC_IDC_EPISODE_ID: 275
MDC_IDC_EPISODE_ID: 276
MDC_IDC_EPISODE_ID: 277
MDC_IDC_EPISODE_ID: 278
MDC_IDC_EPISODE_ID: 279
MDC_IDC_EPISODE_ID: 280
MDC_IDC_EPISODE_ID: 281
MDC_IDC_EPISODE_ID: 282
MDC_IDC_EPISODE_ID: 283
MDC_IDC_EPISODE_ID: 284
MDC_IDC_EPISODE_ID: 285
MDC_IDC_EPISODE_ID: 286
MDC_IDC_EPISODE_ID: 287
MDC_IDC_EPISODE_ID: 288
MDC_IDC_EPISODE_ID: 289
MDC_IDC_EPISODE_ID: 290
MDC_IDC_EPISODE_ID: 291
MDC_IDC_EPISODE_ID: 292
MDC_IDC_EPISODE_ID: 293
MDC_IDC_EPISODE_ID: 294
MDC_IDC_EPISODE_ID: 295
MDC_IDC_EPISODE_ID: 296
MDC_IDC_EPISODE_ID: 297
MDC_IDC_EPISODE_ID: 298
MDC_IDC_EPISODE_ID: 299
MDC_IDC_EPISODE_ID: 300
MDC_IDC_EPISODE_ID: 301
MDC_IDC_EPISODE_ID: 302
MDC_IDC_EPISODE_ID: 303
MDC_IDC_EPISODE_ID: 304
MDC_IDC_EPISODE_ID: 305
MDC_IDC_EPISODE_ID: 306
MDC_IDC_EPISODE_ID: 307
MDC_IDC_EPISODE_ID: 308
MDC_IDC_EPISODE_ID: 309
MDC_IDC_EPISODE_ID: 310
MDC_IDC_EPISODE_ID: 311
MDC_IDC_EPISODE_ID: 312
MDC_IDC_EPISODE_ID: 313
MDC_IDC_EPISODE_ID: 314
MDC_IDC_EPISODE_ID: 315
MDC_IDC_EPISODE_ID: 316
MDC_IDC_EPISODE_TYPE: NORMAL
MDC_IDC_EPISODE_TYPE_INDUCED: NO
MDC_IDC_LEAD_CONNECTION_STATUS: NORMAL
MDC_IDC_LEAD_CONNECTION_STATUS: NORMAL
MDC_IDC_LEAD_IMPLANT_DT: NORMAL
MDC_IDC_LEAD_IMPLANT_DT: NORMAL
MDC_IDC_LEAD_LOCATION: NORMAL
MDC_IDC_LEAD_LOCATION: NORMAL
MDC_IDC_LEAD_LOCATION_DETAIL_1: NORMAL
MDC_IDC_LEAD_LOCATION_DETAIL_1: NORMAL
MDC_IDC_LEAD_MFG: NORMAL
MDC_IDC_LEAD_MFG: NORMAL
MDC_IDC_LEAD_MODEL: NORMAL
MDC_IDC_LEAD_MODEL: NORMAL
MDC_IDC_LEAD_POLARITY_TYPE: NORMAL
MDC_IDC_LEAD_POLARITY_TYPE: NORMAL
MDC_IDC_LEAD_SERIAL: NORMAL
MDC_IDC_LEAD_SERIAL: NORMAL
MDC_IDC_LEAD_SPECIAL_FUNCTION: NORMAL
MDC_IDC_LEAD_SPECIAL_FUNCTION: NORMAL
MDC_IDC_MSMT_BATTERY_DTM: NORMAL
MDC_IDC_MSMT_BATTERY_REMAINING_LONGEVITY: 98 MO
MDC_IDC_MSMT_BATTERY_RRT_TRIGGER: 2.62
MDC_IDC_MSMT_BATTERY_STATUS: NORMAL
MDC_IDC_MSMT_BATTERY_VOLTAGE: 2.99 V
MDC_IDC_MSMT_LEADCHNL_RA_IMPEDANCE_VALUE: 304 OHM
MDC_IDC_MSMT_LEADCHNL_RA_IMPEDANCE_VALUE: 342 OHM
MDC_IDC_MSMT_LEADCHNL_RA_PACING_THRESHOLD_AMPLITUDE: 0.75 V
MDC_IDC_MSMT_LEADCHNL_RA_PACING_THRESHOLD_PULSEWIDTH: 0.4 MS
MDC_IDC_MSMT_LEADCHNL_RA_SENSING_INTR_AMPL: 1.88 MV
MDC_IDC_MSMT_LEADCHNL_RA_SENSING_INTR_AMPL: 1.88 MV
MDC_IDC_MSMT_LEADCHNL_RV_IMPEDANCE_VALUE: 399 OHM
MDC_IDC_MSMT_LEADCHNL_RV_IMPEDANCE_VALUE: 437 OHM
MDC_IDC_MSMT_LEADCHNL_RV_PACING_THRESHOLD_AMPLITUDE: 1 V
MDC_IDC_MSMT_LEADCHNL_RV_PACING_THRESHOLD_PULSEWIDTH: 0.4 MS
MDC_IDC_MSMT_LEADCHNL_RV_SENSING_INTR_AMPL: 11.12 MV
MDC_IDC_PG_IMPLANT_DTM: NORMAL
MDC_IDC_PG_MFG: NORMAL
MDC_IDC_PG_MODEL: NORMAL
MDC_IDC_PG_SERIAL: NORMAL
MDC_IDC_PG_TYPE: NORMAL
MDC_IDC_SESS_CLINIC_NAME: NORMAL
MDC_IDC_SESS_DTM: NORMAL
MDC_IDC_SESS_TYPE: NORMAL
MDC_IDC_SET_BRADY_AT_MODE_SWITCH_RATE: 171 {BEATS}/MIN
MDC_IDC_SET_BRADY_HYSTRATE: NORMAL
MDC_IDC_SET_BRADY_LOWRATE: 60 {BEATS}/MIN
MDC_IDC_SET_BRADY_MAX_SENSOR_RATE: 130 {BEATS}/MIN
MDC_IDC_SET_BRADY_MAX_TRACKING_RATE: 130 {BEATS}/MIN
MDC_IDC_SET_BRADY_MODE: NORMAL
MDC_IDC_SET_BRADY_PAV_DELAY_LOW: 180 MS
MDC_IDC_SET_BRADY_SAV_DELAY_LOW: 150 MS
MDC_IDC_SET_LEADCHNL_RA_PACING_AMPLITUDE: 1.5 V
MDC_IDC_SET_LEADCHNL_RA_PACING_ANODE_ELECTRODE_1: NORMAL
MDC_IDC_SET_LEADCHNL_RA_PACING_ANODE_LOCATION_1: NORMAL
MDC_IDC_SET_LEADCHNL_RA_PACING_CAPTURE_MODE: NORMAL
MDC_IDC_SET_LEADCHNL_RA_PACING_CATHODE_ELECTRODE_1: NORMAL
MDC_IDC_SET_LEADCHNL_RA_PACING_CATHODE_LOCATION_1: NORMAL
MDC_IDC_SET_LEADCHNL_RA_PACING_POLARITY: NORMAL
MDC_IDC_SET_LEADCHNL_RA_PACING_PULSEWIDTH: 0.4 MS
MDC_IDC_SET_LEADCHNL_RA_SENSING_ANODE_ELECTRODE_1: NORMAL
MDC_IDC_SET_LEADCHNL_RA_SENSING_ANODE_LOCATION_1: NORMAL
MDC_IDC_SET_LEADCHNL_RA_SENSING_CATHODE_ELECTRODE_1: NORMAL
MDC_IDC_SET_LEADCHNL_RA_SENSING_CATHODE_LOCATION_1: NORMAL
MDC_IDC_SET_LEADCHNL_RA_SENSING_POLARITY: NORMAL
MDC_IDC_SET_LEADCHNL_RA_SENSING_SENSITIVITY: 0.3 MV
MDC_IDC_SET_LEADCHNL_RV_PACING_AMPLITUDE: 2 V
MDC_IDC_SET_LEADCHNL_RV_PACING_ANODE_ELECTRODE_1: NORMAL
MDC_IDC_SET_LEADCHNL_RV_PACING_ANODE_LOCATION_1: NORMAL
MDC_IDC_SET_LEADCHNL_RV_PACING_CAPTURE_MODE: NORMAL
MDC_IDC_SET_LEADCHNL_RV_PACING_CATHODE_ELECTRODE_1: NORMAL
MDC_IDC_SET_LEADCHNL_RV_PACING_CATHODE_LOCATION_1: NORMAL
MDC_IDC_SET_LEADCHNL_RV_PACING_POLARITY: NORMAL
MDC_IDC_SET_LEADCHNL_RV_PACING_PULSEWIDTH: 0.4 MS
MDC_IDC_SET_LEADCHNL_RV_SENSING_ANODE_ELECTRODE_1: NORMAL
MDC_IDC_SET_LEADCHNL_RV_SENSING_ANODE_LOCATION_1: NORMAL
MDC_IDC_SET_LEADCHNL_RV_SENSING_CATHODE_ELECTRODE_1: NORMAL
MDC_IDC_SET_LEADCHNL_RV_SENSING_CATHODE_LOCATION_1: NORMAL
MDC_IDC_SET_LEADCHNL_RV_SENSING_POLARITY: NORMAL
MDC_IDC_SET_LEADCHNL_RV_SENSING_SENSITIVITY: 0.9 MV
MDC_IDC_SET_ZONE_DETECTION_INTERVAL: 350 MS
MDC_IDC_SET_ZONE_DETECTION_INTERVAL: 400 MS
MDC_IDC_SET_ZONE_STATUS: NORMAL
MDC_IDC_SET_ZONE_STATUS: NORMAL
MDC_IDC_SET_ZONE_TYPE: NORMAL
MDC_IDC_SET_ZONE_VENDOR_TYPE: NORMAL
MDC_IDC_STAT_AT_BURDEN_PERCENT: 11.2 %
MDC_IDC_STAT_AT_DTM_END: NORMAL
MDC_IDC_STAT_AT_DTM_START: NORMAL
MDC_IDC_STAT_BRADY_AP_VP_PERCENT: 0.43 %
MDC_IDC_STAT_BRADY_AP_VS_PERCENT: 82.65 %
MDC_IDC_STAT_BRADY_AS_VP_PERCENT: 0.04 %
MDC_IDC_STAT_BRADY_AS_VS_PERCENT: 16.92 %
MDC_IDC_STAT_BRADY_DTM_END: NORMAL
MDC_IDC_STAT_BRADY_DTM_START: NORMAL
MDC_IDC_STAT_BRADY_RA_PERCENT_PACED: 74.23 %
MDC_IDC_STAT_BRADY_RV_PERCENT_PACED: 1.37 %
MDC_IDC_STAT_EPISODE_RECENT_COUNT: 0
MDC_IDC_STAT_EPISODE_RECENT_COUNT: 2
MDC_IDC_STAT_EPISODE_RECENT_COUNT: 294
MDC_IDC_STAT_EPISODE_RECENT_COUNT_DTM_END: NORMAL
MDC_IDC_STAT_EPISODE_RECENT_COUNT_DTM_START: NORMAL
MDC_IDC_STAT_EPISODE_TOTAL_COUNT: 0
MDC_IDC_STAT_EPISODE_TOTAL_COUNT: 0
MDC_IDC_STAT_EPISODE_TOTAL_COUNT: 1
MDC_IDC_STAT_EPISODE_TOTAL_COUNT: 310
MDC_IDC_STAT_EPISODE_TOTAL_COUNT: 4
MDC_IDC_STAT_EPISODE_TOTAL_COUNT_DTM_END: NORMAL
MDC_IDC_STAT_EPISODE_TOTAL_COUNT_DTM_START: NORMAL
MDC_IDC_STAT_EPISODE_TYPE: NORMAL
MDC_IDC_STAT_TACHYTHERAPY_RECENT_DTM_END: NORMAL
MDC_IDC_STAT_TACHYTHERAPY_RECENT_DTM_START: NORMAL
MDC_IDC_STAT_TACHYTHERAPY_TOTAL_DTM_END: NORMAL
MDC_IDC_STAT_TACHYTHERAPY_TOTAL_DTM_START: NORMAL

## 2025-07-29 NOTE — PROGRESS NOTES
Anemia Management Note - Follow Up      SUBJECTIVE/OBJECTIVE:    Referred by Dr. Marzena Martin on 2024  Primary Diagnosis: Anemia in Chronic Kidney Disease (N18.4, D63.1)     Secondary Diagnosis: Chronic Kidney Disease, Stage 4 (N18.4)   Hgb goal range: 9-10     Epo/Darbo: TBD; treat with IV Iron per clinic note on 24.   Iron regimen: Treat with IV Iron; Oral Iron stopped  *Note form 24; Recommended to stop oral iron and start IV iron due to potential for GI bleeding and difficulty in knowing if black stools are from iron or bleeding.       Labs : 2026  RX/TX plans : TBD     Recent BREANNA use, transfusion, IV iron: Venofer. Injectafer and PRBCs  Hx of CAD, NTEMI (), HTN, Pacemaker, AFib, Anticoagulated.     Contact: Consent to Communicate scanned on 2019.         Latest Ref Rng & Units 2025 4/3/2025 2025 2025 2025 2025 2025   Anemia   Hemoglobin 11.7 - 15.7 g/dL 11.8  11.1  9.8  10.1  10.3  11.9  9.5    TSAT 15 - 46 %     15   15    Ferritin 11 - 328 ng/mL     393   242      BP Readings from Last 3 Encounters:   25 (!) 191/90   25 (!) 146/76   25 (!) 149/72     Wt Readings from Last 2 Encounters:   25 67.5 kg (148 lb 14.4 oz)   25 65.8 kg (145 lb)           ASSESSMENT:    Hgb:at goal - continue to monitor  TSat: not at goal of >30% Ferritin: At goal (>100ng/mL)        PLAN:  Spoke with Tessa. She wants to hold to off on the IV Iron for now, they are in the process of moving.  She will recheck labs in one month and then decide if she would like IV Iron.  Will follow up in one month.     Orders needed to be renewed (for next follow-up date) in EPIC: None    Iron labs due:  End of Aug 2025. Labs are scheduled for 25.     Plan discussed with:  Tessa      NEXT FOLLOW-UP DATE:  25    Barbara Dimas RN   Anemia Services  [facility]  [email or web address]   Office : [phone]  Fax: [phone]

## 2025-08-04 ENCOUNTER — LAB (OUTPATIENT)
Dept: LAB | Facility: CLINIC | Age: 89
End: 2025-08-04
Payer: COMMERCIAL

## 2025-08-04 DIAGNOSIS — N18.4 CKD (CHRONIC KIDNEY DISEASE) STAGE 4, GFR 15-29 ML/MIN (H): ICD-10-CM

## 2025-08-04 DIAGNOSIS — I50.33 ACUTE ON CHRONIC DIASTOLIC HEART FAILURE (H): ICD-10-CM

## 2025-08-04 LAB
HGB BLD-MCNC: 9.6 G/DL (ref 11.7–15.7)
MCV RBC AUTO: 96 FL (ref 78–100)

## 2025-08-04 PROCEDURE — 83970 ASSAY OF PARATHORMONE: CPT

## 2025-08-04 PROCEDURE — 36415 COLL VENOUS BLD VENIPUNCTURE: CPT

## 2025-08-04 PROCEDURE — 83550 IRON BINDING TEST: CPT

## 2025-08-04 PROCEDURE — 82043 UR ALBUMIN QUANTITATIVE: CPT

## 2025-08-04 PROCEDURE — 82728 ASSAY OF FERRITIN: CPT

## 2025-08-04 PROCEDURE — 83540 ASSAY OF IRON: CPT

## 2025-08-04 PROCEDURE — 82570 ASSAY OF URINE CREATININE: CPT

## 2025-08-04 PROCEDURE — 85018 HEMOGLOBIN: CPT

## 2025-08-04 PROCEDURE — 80069 RENAL FUNCTION PANEL: CPT

## 2025-08-05 LAB
ALBUMIN SERPL BCG-MCNC: 4.1 G/DL (ref 3.5–5.2)
ANION GAP SERPL CALCULATED.3IONS-SCNC: 15 MMOL/L (ref 7–15)
BUN SERPL-MCNC: 24 MG/DL (ref 8–23)
CALCIUM SERPL-MCNC: 9.3 MG/DL (ref 8.8–10.4)
CHLORIDE SERPL-SCNC: 109 MMOL/L (ref 98–107)
CREAT SERPL-MCNC: 1.74 MG/DL (ref 0.51–0.95)
CREAT UR-MCNC: 25.1 MG/DL
EGFRCR SERPLBLD CKD-EPI 2021: 28 ML/MIN/1.73M2
FERRITIN SERPL-MCNC: 202 NG/ML (ref 11–328)
GLUCOSE SERPL-MCNC: 75 MG/DL (ref 70–99)
HCO3 SERPL-SCNC: 21 MMOL/L (ref 22–29)
IRON BINDING CAPACITY (ROCHE): 269 UG/DL (ref 240–430)
IRON SATN MFR SERPL: 14 % (ref 15–46)
IRON SERPL-MCNC: 39 UG/DL (ref 37–145)
MICROALBUMIN UR-MCNC: 746 MG/L
MICROALBUMIN/CREAT UR: 2972.11 MG/G CR (ref 0–25)
PHOSPHATE SERPL-MCNC: 3.2 MG/DL (ref 2.5–4.5)
POTASSIUM SERPL-SCNC: 3.3 MMOL/L (ref 3.4–5.3)
PTH-INTACT SERPL-MCNC: 75 PG/ML (ref 15–65)
SODIUM SERPL-SCNC: 145 MMOL/L (ref 135–145)

## 2025-08-16 ENCOUNTER — HEALTH MAINTENANCE LETTER (OUTPATIENT)
Age: 89
End: 2025-08-16

## 2025-09-04 ENCOUNTER — OFFICE VISIT (OUTPATIENT)
Dept: CARDIOLOGY | Facility: CLINIC | Age: 89
End: 2025-09-04
Attending: STUDENT IN AN ORGANIZED HEALTH CARE EDUCATION/TRAINING PROGRAM
Payer: COMMERCIAL

## 2025-09-04 ENCOUNTER — LAB (OUTPATIENT)
Dept: LAB | Facility: CLINIC | Age: 89
End: 2025-09-04
Attending: STUDENT IN AN ORGANIZED HEALTH CARE EDUCATION/TRAINING PROGRAM
Payer: COMMERCIAL

## 2025-09-04 ENCOUNTER — PATIENT OUTREACH (OUTPATIENT)
Dept: CARE COORDINATION | Facility: CLINIC | Age: 89
End: 2025-09-04

## 2025-09-04 VITALS
WEIGHT: 142 LBS | BODY MASS INDEX: 22.92 KG/M2 | SYSTOLIC BLOOD PRESSURE: 146 MMHG | OXYGEN SATURATION: 98 % | DIASTOLIC BLOOD PRESSURE: 74 MMHG | HEART RATE: 84 BPM

## 2025-09-04 DIAGNOSIS — D63.1 ANEMIA OF CHRONIC RENAL FAILURE, STAGE 4 (SEVERE) (H): Primary | ICD-10-CM

## 2025-09-04 DIAGNOSIS — N18.4 ANEMIA OF CHRONIC RENAL FAILURE, STAGE 4 (SEVERE) (H): ICD-10-CM

## 2025-09-04 DIAGNOSIS — N18.4 CKD (CHRONIC KIDNEY DISEASE) STAGE 4, GFR 15-29 ML/MIN (H): ICD-10-CM

## 2025-09-04 DIAGNOSIS — D63.1 ANEMIA OF CHRONIC RENAL FAILURE, STAGE 4 (SEVERE) (H): ICD-10-CM

## 2025-09-04 DIAGNOSIS — N18.4 ANEMIA OF CHRONIC RENAL FAILURE, STAGE 4 (SEVERE) (H): Primary | ICD-10-CM

## 2025-09-04 DIAGNOSIS — I34.0 NONRHEUMATIC MITRAL VALVE REGURGITATION: ICD-10-CM

## 2025-09-04 DIAGNOSIS — Z95.818 PRESENCE OF WATCHMAN LEFT ATRIAL APPENDAGE CLOSURE DEVICE: ICD-10-CM

## 2025-09-04 DIAGNOSIS — I25.118 CORONARY ARTERY DISEASE OF NATIVE ARTERY OF NATIVE HEART WITH STABLE ANGINA PECTORIS: Primary | ICD-10-CM

## 2025-09-04 LAB
ANION GAP SERPL CALCULATED.3IONS-SCNC: 15 MMOL/L (ref 7–15)
BUN SERPL-MCNC: 45.2 MG/DL (ref 8–23)
CALCIUM SERPL-MCNC: 9.6 MG/DL (ref 8.8–10.4)
CHLORIDE SERPL-SCNC: 105 MMOL/L (ref 98–107)
CREAT SERPL-MCNC: 2.42 MG/DL (ref 0.51–0.95)
EGFRCR SERPLBLD CKD-EPI 2021: 19 ML/MIN/1.73M2
ERYTHROCYTE [DISTWIDTH] IN BLOOD BY AUTOMATED COUNT: 15.3 % (ref 10–15)
FERRITIN SERPL-MCNC: 234 NG/ML (ref 11–328)
GLUCOSE SERPL-MCNC: 98 MG/DL (ref 70–99)
HCO3 SERPL-SCNC: 26 MMOL/L (ref 22–29)
HCT VFR BLD AUTO: 34.3 % (ref 35–47)
HGB BLD-MCNC: 10.7 G/DL (ref 11.7–15.7)
IRON BINDING CAPACITY (ROCHE): 274 UG/DL (ref 240–430)
IRON SATN MFR SERPL: 15 % (ref 15–46)
IRON SERPL-MCNC: 40 UG/DL (ref 37–145)
MCH RBC QN AUTO: 29.5 PG (ref 26.5–33)
MCHC RBC AUTO-ENTMCNC: 31.2 G/DL (ref 31.5–36.5)
MCV RBC AUTO: 94.5 FL (ref 78–100)
PLATELET # BLD AUTO: 103 10E3/UL (ref 150–450)
POTASSIUM SERPL-SCNC: 4.5 MMOL/L (ref 3.4–5.3)
RBC # BLD AUTO: 3.63 10E6/UL (ref 3.8–5.2)
SODIUM SERPL-SCNC: 146 MMOL/L (ref 135–145)
WBC # BLD AUTO: 6.57 10E3/UL (ref 4–11)

## 2025-09-04 PROCEDURE — G0463 HOSPITAL OUTPT CLINIC VISIT: HCPCS | Performed by: STUDENT IN AN ORGANIZED HEALTH CARE EDUCATION/TRAINING PROGRAM

## 2025-09-04 ASSESSMENT — PAIN SCALES - GENERAL: PAINLEVEL_OUTOF10: NO PAIN (0)

## (undated) DEVICE — INTRO SHEATH AVANTI 4FRX23CM 504604T

## (undated) DEVICE — KIT HAND CONTROL ACIST 016795

## (undated) DEVICE — PACK HEART LEFT CUSTOM

## (undated) DEVICE — CATH BALLOON EMERGE 2.5X12MM H7493918912250

## (undated) DEVICE — DRSG PRIMAPORE 04 3/4X13 3/4" 66007140

## (undated) DEVICE — TUBING PRESSURE 30"

## (undated) DEVICE — INTRO SHEATH 6FRX25CM PINNACLE RSS606

## (undated) DEVICE — SPONGE LAP 18X18" X8435

## (undated) DEVICE — SHEATH WITH DILATOR ACCESS SYSTEM FXD CRV DBL M635TU80020

## (undated) DEVICE — ESU CLEANER TIP 31142717

## (undated) DEVICE — LINEN TOWEL PACK X30 5481

## (undated) DEVICE — CATH ANGIO INFINITI 3DRC 4FRX100CM 538476

## (undated) DEVICE — CATH BALLOON EMERGE 2.0X20MM H7493918920200

## (undated) DEVICE — PREP CHLORAPREP 26ML TINTED HI-LITE ORANGE 930815

## (undated) DEVICE — MANIFOLD KIT ANGIO AUTOMATED 014613

## (undated) DEVICE — FASTENER CATH BALLOON CLAMPX2 STATLOCK 0684-00-493

## (undated) DEVICE — SUCTION MANIFOLD NEPTUNE 2 SYS 4 PORT 0702-020-000

## (undated) DEVICE — SOL WATER IRRIG 1000ML BOTTLE 2F7114

## (undated) DEVICE — CATH BALLOON NC EMERGE 2.75X15MM H7493926715270

## (undated) DEVICE — DRAPE SHEET REV FOLD 3/4 9349

## (undated) DEVICE — KIT DVC ANGIO IBASIXCOMPAK 13INX20ML 3WAY IN4430

## (undated) DEVICE — CATH GUIDING BLUE YELLOW PTFE JR4 6FRX100CM 67008200

## (undated) DEVICE — INTRO SHEATH 6FRX10CM PINNACLE RSS602

## (undated) DEVICE — VALVE HEMOSTASIS GUARDIAN II OD8 FR GUIDEWIRE 8215

## (undated) DEVICE — CATH ANGIO INFINITI JL4 4FRX100CM 538420

## (undated) DEVICE — PREP POVIDONE-IODINE 7.5% SCRUB 4OZ BOTTLE MDS093945

## (undated) DEVICE — Device

## (undated) DEVICE — INTRO GLIDESHEATH SLENDER 6FR 10X45CM 60-1060

## (undated) DEVICE — LINEN TOWEL PACK X6 WHITE 5487

## (undated) DEVICE — GUIDEWIRE VASC 0.014INX180CM RUNTHROUGH 25-1011

## (undated) DEVICE — VALVE HEMOSTASIS .096" COPILOT MECH 1003331

## (undated) DEVICE — CATH ANGIO IMPULSE 6FR 110CML  PIGTAIL

## (undated) DEVICE — SET PERICARDIOCENTESIS 8.3FRX40CM

## (undated) DEVICE — SU VICRYL 3-0 SH 27" UND J416H

## (undated) DEVICE — WIRE GUIDE HI-TRQ BALANCE MDWT JTIP 0.014"X190CM 1001780J-HC

## (undated) DEVICE — INTRO SHEATH 7FRX10CM PINNACLE RSS702

## (undated) DEVICE — SHTH INTRO 0.021IN ID 6FR DIA

## (undated) DEVICE — KIT HAND CONTROL ACIST 014644 AR-P54

## (undated) DEVICE — INTRO SHEATH MICRO PLATINUM TIP 4FRX40CM 7274

## (undated) DEVICE — CATH GUIDING BLUE YELLOW PTFE XB3.5 6FRX100CM 67005400

## (undated) DEVICE — WIPES FOLEY CARE SURESTEP PROVON DFC100

## (undated) DEVICE — SU SILK 3-0 TIE 12X30" A304H

## (undated) DEVICE — DRSG ABTHERA ADVANCE OPEN ABDOMEN ABT1055

## (undated) DEVICE — PACK AB HYST II

## (undated) DEVICE — PAD DEFIBRILLATOR ELECTRODE 4.25INX6IN ADULT 2001M-C

## (undated) DEVICE — PREP SKIN SCRUB TRAY 4461A

## (undated) DEVICE — ASSURANCE CLIP

## (undated) DEVICE — GUIDEWIRE OPTOWIRE 3 W/O GAUGE FACTOR CONNECTOR F1032

## (undated) DEVICE — PREP POVIDONE-IODINE 10% SOLUTION 4OZ BOTTLE MDS093944

## (undated) DEVICE — DRAIN CLOSED FLUID SYSTEM ABSCESS EVAC SET 90500040

## (undated) DEVICE — DRSG QUICKCLOT TRAUMA PAD 12X12 4030

## (undated) DEVICE — TUBING ANGIOGRAPHY EXCITE NYLON POLYURETHANE 1200 PSI L30 IN

## (undated) DEVICE — 8FR GUARDIAN II HEMOSTASIS VALVE WITH GUIDEWIRE INSERTION TOOL (FORMERLY VASCULAR SOLUTIONS)

## (undated) DEVICE — SU PDS II 0 CTX 36" Z370T

## (undated) DEVICE — SU SILK 0 TIE 6X30" A306H

## (undated) DEVICE — CLOSURE DEVICE 6FR VASC PROGLIDE MEDICATED SUTURE 12673-03

## (undated) DEVICE — WIRE GUIDE HI-TRQ PILOT 50 JTIP 0.014"X190CM 1010480-HJ

## (undated) DEVICE — WIRE GUIDE 0.035"X145CM AMPLATZ XSTIFF J THSCF-35-145-3-AES

## (undated) DEVICE — SU SILK 3-0 SH CR 8X18" C013D

## (undated) DEVICE — SU SILK 2-0 TIE 12X30" A305H

## (undated) DEVICE — SHEATH INTRODUCER DRYSEAL FLEX W/HYDRO CT 16FRX33CM DSF1633

## (undated) DEVICE — CATH BALLOON NC EMERGE 3.00X20MM H7493926720300

## (undated) DEVICE — SU PROLENE 1 CTX 30" 8455H

## (undated) DEVICE — SHEATH GUIDING VERSACROSS D1 CURVE L85 CM L180 CBL VXAK0003

## (undated) DEVICE — SU SILK 4-0 TIE 12X30" A303H

## (undated) DEVICE — SOL NACL 0.9% IRRIG 1000ML BOTTLE 2F7124

## (undated) DEVICE — GLOVE BIOGEL PI MICRO SZ 7.5 48575

## (undated) RX ORDER — LABETALOL HYDROCHLORIDE 5 MG/ML
INJECTION, SOLUTION INTRAVENOUS
Status: DISPENSED
Start: 2024-04-13

## (undated) RX ORDER — FENTANYL CITRATE 50 UG/ML
INJECTION, SOLUTION INTRAMUSCULAR; INTRAVENOUS
Status: DISPENSED
Start: 2024-08-08

## (undated) RX ORDER — GLYCOPYRROLATE 0.2 MG/ML
INJECTION, SOLUTION INTRAMUSCULAR; INTRAVENOUS
Status: DISPENSED
Start: 2024-08-08

## (undated) RX ORDER — PROTAMINE SULFATE 10 MG/ML
INJECTION, SOLUTION INTRAVENOUS
Status: DISPENSED
Start: 2024-08-08

## (undated) RX ORDER — SODIUM CHLORIDE 9 MG/ML
INJECTION, SOLUTION INTRAVENOUS
Status: DISPENSED
Start: 2024-04-13

## (undated) RX ORDER — LIDOCAINE HYDROCHLORIDE 10 MG/ML
INJECTION, SOLUTION EPIDURAL; INFILTRATION; INTRACAUDAL; PERINEURAL
Status: DISPENSED
Start: 2022-06-17

## (undated) RX ORDER — FENTANYL CITRATE-0.9 % NACL/PF 10 MCG/ML
PLASTIC BAG, INJECTION (ML) INTRAVENOUS
Status: DISPENSED
Start: 2024-04-13

## (undated) RX ORDER — FENTANYL CITRATE-0.9 % NACL/PF 10 MCG/ML
PLASTIC BAG, INJECTION (ML) INTRAVENOUS
Status: DISPENSED
Start: 2024-08-08

## (undated) RX ORDER — SODIUM CHLORIDE, SODIUM LACTATE, POTASSIUM CHLORIDE, CALCIUM CHLORIDE 600; 310; 30; 20 MG/100ML; MG/100ML; MG/100ML; MG/100ML
INJECTION, SOLUTION INTRAVENOUS
Status: DISPENSED
Start: 2024-04-13

## (undated) RX ORDER — NITROGLYCERIN 5 MG/ML
VIAL (ML) INTRAVENOUS
Status: DISPENSED
Start: 2022-06-17

## (undated) RX ORDER — SODIUM CHLORIDE 9 MG/ML
INJECTION, SOLUTION INTRAVENOUS
Status: DISPENSED
Start: 2023-07-17

## (undated) RX ORDER — HYDRALAZINE HYDROCHLORIDE 20 MG/ML
INJECTION INTRAMUSCULAR; INTRAVENOUS
Status: DISPENSED
Start: 2022-06-17

## (undated) RX ORDER — PROPOFOL 10 MG/ML
INJECTION, EMULSION INTRAVENOUS
Status: DISPENSED
Start: 2024-08-08

## (undated) RX ORDER — NITROGLYCERIN 5 MG/ML
VIAL (ML) INTRAVENOUS
Status: DISPENSED
Start: 2023-11-06

## (undated) RX ORDER — SIMETHICONE 40MG/0.6ML
SUSPENSION, DROPS(FINAL DOSAGE FORM)(ML) ORAL
Status: DISPENSED
Start: 2023-07-21

## (undated) RX ORDER — FENTANYL CITRATE 50 UG/ML
INJECTION, SOLUTION INTRAMUSCULAR; INTRAVENOUS
Status: DISPENSED
Start: 2022-06-17

## (undated) RX ORDER — ASPIRIN 325 MG
TABLET ORAL
Status: DISPENSED
Start: 2022-06-17

## (undated) RX ORDER — HYDROMORPHONE HYDROCHLORIDE 1 MG/ML
INJECTION, SOLUTION INTRAMUSCULAR; INTRAVENOUS; SUBCUTANEOUS
Status: DISPENSED
Start: 2024-04-13

## (undated) RX ORDER — PROPOFOL 10 MG/ML
INJECTION, EMULSION INTRAVENOUS
Status: DISPENSED
Start: 2024-04-13

## (undated) RX ORDER — HEPARIN SODIUM 1000 [USP'U]/ML
INJECTION, SOLUTION INTRAVENOUS; SUBCUTANEOUS
Status: DISPENSED
Start: 2022-06-17

## (undated) RX ORDER — PROPOFOL 10 MG/ML
INJECTION, EMULSION INTRAVENOUS
Status: DISPENSED
Start: 2023-07-20

## (undated) RX ORDER — SODIUM CHLORIDE 9 MG/ML
INJECTION, SOLUTION INTRAVENOUS
Status: DISPENSED
Start: 2019-10-15

## (undated) RX ORDER — HEPARIN SODIUM 1000 [USP'U]/ML
INJECTION, SOLUTION INTRAVENOUS; SUBCUTANEOUS
Status: DISPENSED
Start: 2023-11-06

## (undated) RX ORDER — FENTANYL CITRATE 50 UG/ML
INJECTION, SOLUTION INTRAMUSCULAR; INTRAVENOUS
Status: DISPENSED
Start: 2024-04-14

## (undated) RX ORDER — LIDOCAINE HYDROCHLORIDE 10 MG/ML
INJECTION, SOLUTION EPIDURAL; INFILTRATION; INTRACAUDAL; PERINEURAL
Status: DISPENSED
Start: 2024-08-08

## (undated) RX ORDER — SODIUM CHLORIDE 9 MG/ML
INJECTION, SOLUTION INTRAVENOUS
Status: DISPENSED
Start: 2022-06-17

## (undated) RX ORDER — CEFAZOLIN SODIUM/WATER 2 G/20 ML
SYRINGE (ML) INTRAVENOUS
Status: DISPENSED
Start: 2024-08-08

## (undated) RX ORDER — HEPARIN SODIUM 1000 [USP'U]/ML
INJECTION, SOLUTION INTRAVENOUS; SUBCUTANEOUS
Status: DISPENSED
Start: 2024-08-08

## (undated) RX ORDER — ONDANSETRON 2 MG/ML
INJECTION INTRAMUSCULAR; INTRAVENOUS
Status: DISPENSED
Start: 2024-08-08

## (undated) RX ORDER — FENTANYL CITRATE 50 UG/ML
INJECTION, SOLUTION INTRAMUSCULAR; INTRAVENOUS
Status: DISPENSED
Start: 2023-07-17

## (undated) RX ORDER — LIDOCAINE 40 MG/G
CREAM TOPICAL
Status: DISPENSED
Start: 2022-06-17

## (undated) RX ORDER — FENTANYL CITRATE 50 UG/ML
INJECTION, SOLUTION INTRAMUSCULAR; INTRAVENOUS
Status: DISPENSED
Start: 2023-11-06

## (undated) RX ORDER — ONDANSETRON 2 MG/ML
INJECTION INTRAMUSCULAR; INTRAVENOUS
Status: DISPENSED
Start: 2023-07-21

## (undated) RX ORDER — NICARDIPINE HCL-0.9% SOD CHLOR 1 MG/10 ML
SYRINGE (ML) INTRAVENOUS
Status: DISPENSED
Start: 2023-07-17

## (undated) RX ORDER — HYDRALAZINE HYDROCHLORIDE 20 MG/ML
INJECTION INTRAMUSCULAR; INTRAVENOUS
Status: DISPENSED
Start: 2023-07-17

## (undated) RX ORDER — NICARDIPINE HCL-0.9% SOD CHLOR 1 MG/10 ML
SYRINGE (ML) INTRAVENOUS
Status: DISPENSED
Start: 2022-06-17

## (undated) RX ORDER — LIDOCAINE HYDROCHLORIDE 20 MG/ML
SOLUTION OROPHARYNGEAL
Status: DISPENSED
Start: 2024-10-01

## (undated) RX ORDER — FENTANYL CITRATE 50 UG/ML
INJECTION, SOLUTION INTRAMUSCULAR; INTRAVENOUS
Status: DISPENSED
Start: 2024-04-13

## (undated) RX ORDER — NITROGLYCERIN 5 MG/ML
VIAL (ML) INTRAVENOUS
Status: DISPENSED
Start: 2023-07-17

## (undated) RX ORDER — FENTANYL CITRATE 50 UG/ML
INJECTION, SOLUTION INTRAMUSCULAR; INTRAVENOUS
Status: DISPENSED
Start: 2024-10-01

## (undated) RX ORDER — LIDOCAINE HYDROCHLORIDE 10 MG/ML
INJECTION, SOLUTION EPIDURAL; INFILTRATION; INTRACAUDAL; PERINEURAL
Status: DISPENSED
Start: 2023-07-17

## (undated) RX ORDER — EPHEDRINE SULFATE 50 MG/ML
INJECTION, SOLUTION INTRAMUSCULAR; INTRAVENOUS; SUBCUTANEOUS
Status: DISPENSED
Start: 2024-04-13

## (undated) RX ORDER — ACETAMINOPHEN 325 MG/1
TABLET ORAL
Status: DISPENSED
Start: 2024-08-08

## (undated) RX ORDER — DEXAMETHASONE SODIUM PHOSPHATE 4 MG/ML
INJECTION, SOLUTION INTRA-ARTICULAR; INTRALESIONAL; INTRAMUSCULAR; INTRAVENOUS; SOFT TISSUE
Status: DISPENSED
Start: 2024-08-08

## (undated) RX ORDER — FENTANYL CITRATE 50 UG/ML
INJECTION, SOLUTION INTRAMUSCULAR; INTRAVENOUS
Status: DISPENSED
Start: 2019-10-15

## (undated) RX ORDER — HEPARIN SODIUM 1000 [USP'U]/ML
INJECTION, SOLUTION INTRAVENOUS; SUBCUTANEOUS
Status: DISPENSED
Start: 2023-07-17

## (undated) RX ORDER — ONDANSETRON 2 MG/ML
INJECTION INTRAMUSCULAR; INTRAVENOUS
Status: DISPENSED
Start: 2024-04-13

## (undated) RX ORDER — PROPOFOL 10 MG/ML
INJECTION, EMULSION INTRAVENOUS
Status: DISPENSED
Start: 2024-04-14

## (undated) RX ORDER — PHENYLEPHRINE HCL IN 0.9% NACL 50MG/250ML
PLASTIC BAG, INJECTION (ML) INTRAVENOUS
Status: DISPENSED
Start: 2024-08-08

## (undated) RX ORDER — ONDANSETRON 2 MG/ML
INJECTION INTRAMUSCULAR; INTRAVENOUS
Status: DISPENSED
Start: 2024-04-14

## (undated) RX ORDER — PROPOFOL 10 MG/ML
INJECTION, EMULSION INTRAVENOUS
Status: DISPENSED
Start: 2023-07-21

## (undated) RX ORDER — FENTANYL CITRATE 50 UG/ML
INJECTION, SOLUTION INTRAMUSCULAR; INTRAVENOUS
Status: DISPENSED
Start: 2024-08-11

## (undated) RX ORDER — FENTANYL CITRATE 50 UG/ML
INJECTION, SOLUTION INTRAMUSCULAR; INTRAVENOUS
Status: DISPENSED
Start: 2025-04-30

## (undated) RX ORDER — LIDOCAINE HYDROCHLORIDE 20 MG/ML
SOLUTION OROPHARYNGEAL
Status: DISPENSED
Start: 2025-04-30

## (undated) RX ORDER — CEFAZOLIN SODIUM 2 G/100ML
INJECTION, SOLUTION INTRAVENOUS
Status: DISPENSED
Start: 2019-10-15

## (undated) RX ORDER — METOPROLOL TARTRATE 1 MG/ML
INJECTION, SOLUTION INTRAVENOUS
Status: DISPENSED
Start: 2023-07-21

## (undated) RX ORDER — SODIUM CHLORIDE 9 MG/ML
INJECTION, SOLUTION INTRAVENOUS
Status: DISPENSED
Start: 2023-11-06

## (undated) RX ORDER — DIPHENHYDRAMINE HYDROCHLORIDE 50 MG/ML
INJECTION INTRAMUSCULAR; INTRAVENOUS
Status: DISPENSED
Start: 2019-10-15